# Patient Record
Sex: MALE | Race: BLACK OR AFRICAN AMERICAN | Employment: UNEMPLOYED | URBAN - METROPOLITAN AREA
[De-identification: names, ages, dates, MRNs, and addresses within clinical notes are randomized per-mention and may not be internally consistent; named-entity substitution may affect disease eponyms.]

---

## 2017-01-30 ENCOUNTER — HOSPITAL ENCOUNTER (EMERGENCY)
Age: 42
Discharge: HOME | End: 2017-01-30
Attending: EMERGENCY MEDICINE
Payer: SELF-PAY

## 2017-01-30 ENCOUNTER — APPOINTMENT (OUTPATIENT)
Dept: GENERAL RADIOLOGY | Age: 42
End: 2017-01-30
Attending: EMERGENCY MEDICINE
Payer: SELF-PAY

## 2017-01-30 VITALS
BODY MASS INDEX: 34.55 KG/M2 | WEIGHT: 215 LBS | DIASTOLIC BLOOD PRESSURE: 94 MMHG | TEMPERATURE: 97.9 F | HEART RATE: 100 BPM | SYSTOLIC BLOOD PRESSURE: 141 MMHG | OXYGEN SATURATION: 99 % | HEIGHT: 66 IN | RESPIRATION RATE: 17 BRPM

## 2017-01-30 DIAGNOSIS — M19.012 ARTHRITIS OF LEFT SHOULDER REGION: ICD-10-CM

## 2017-01-30 DIAGNOSIS — S46.912A SHOULDER STRAIN, LEFT, INITIAL ENCOUNTER: Primary | ICD-10-CM

## 2017-01-30 PROCEDURE — 73030 X-RAY EXAM OF SHOULDER: CPT

## 2017-01-30 PROCEDURE — 99282 EMERGENCY DEPT VISIT SF MDM: CPT | Performed by: EMERGENCY MEDICINE

## 2017-01-30 RX ORDER — TRAMADOL HYDROCHLORIDE 50 MG/1
50 TABLET ORAL
Qty: 20 TAB | Refills: 0 | Status: SHIPPED | OUTPATIENT
Start: 2017-01-30 | End: 2017-03-03 | Stop reason: ALTCHOICE

## 2017-01-30 RX ORDER — NAPROXEN SODIUM 550 MG/1
550 TABLET ORAL
Qty: 20 TAB | Refills: 0 | Status: SHIPPED | OUTPATIENT
Start: 2017-01-30 | End: 2017-03-03 | Stop reason: ALTCHOICE

## 2017-01-30 NOTE — ED PROVIDER NOTES
HPI Comments: Patient presents to the ER complaining of recurrent left shoulder pain. States symptoms have worsened over the past 3 days. Reports some achiness and stiffness to the left shoulder. Denies any chest pain or shortness of breath. Patient reports recurrent issues with pain in this shoulder in the past couple years. Patient is a 35 y.o. male presenting with shoulder pain. Shoulder Pain    The incident occurred more than 2 days ago. There was no injury mechanism. The left shoulder is affected. The pain is at a severity of 3/10. The pain is moderate. The pain does not radiate. He has no other injuries. There is no history of shoulder surgery. Pertinent negatives include no numbness, no muscle weakness and no tingling. Past Medical History:   Diagnosis Date    Acute pancreatitis 11/19/2014    [facility] ([facility] [address] 2002    [facility]     Diabetes mellitus     Hypertension        Past Surgical History:   Procedure Laterality Date    Hx hernia repair      Hx orthopaedic       carpal tunnel surgery         Family History:   Problem Relation Age of Onset    Diabetes Mother     Diabetes Father        Social History     Social History    Marital status:      Spouse name: N/A    Number of children: N/A    Years of education: N/A     Occupational History    Not on file. Social History Main Topics    Smoking status: Current Every Day Smoker     Packs/day: 0.25     Types: Cigarettes    Smokeless tobacco: Former User     Types: Chew    Alcohol use No    Drug use: No    Sexual activity: Yes     Partners: Female     Birth control/ protection: None     Other Topics Concern    Not on file     Social History Narrative         ALLERGIES: Review of patient's allergies indicates no known allergies. Review of Systems   Constitutional: Negative for fatigue, fever and unexpected weight change. HENT: Negative for congestion and dental problem. Eyes: Negative for photophobia. Respiratory: Negative for chest tightness, shortness of breath and stridor. Cardiovascular: Negative for palpitations and leg swelling. Gastrointestinal: Negative for abdominal pain. Endocrine: Negative for polydipsia, polyphagia and polyuria. Genitourinary: Negative for frequency, hematuria and urgency. Musculoskeletal: Negative for back pain and gait problem. Skin: Negative for pallor and rash. Allergic/Immunologic: Negative for food allergies and immunocompromised state. Neurological: Negative for tingling, syncope, speech difficulty and numbness. Hematological: Negative for adenopathy. Does not bruise/bleed easily. Psychiatric/Behavioral: Negative for behavioral problems and confusion. All other systems reviewed and are negative. Vitals:    01/30/17 1500   BP: (!) 141/94   Pulse: 100   Resp: 17   Temp: 97.9 °F (36.6 °C)   SpO2: 99%   Weight: 97.5 kg (215 lb)   Height: 5' 6\" (1.676 m)            Physical Exam   Constitutional: He is oriented to person, place, and time. He appears well-developed and well-nourished. HENT:   Head: Normocephalic and atraumatic. Cardiovascular: Normal rate and regular rhythm. Pulmonary/Chest: Effort normal and breath sounds normal.   Musculoskeletal:        Left shoulder: He exhibits tenderness. He exhibits no bony tenderness, no effusion, no deformity, no spasm and normal strength. Neurological: He is alert and oriented to person, place, and time. He has normal reflexes. Skin: Skin is warm. Nursing note and vitals reviewed. MDM  Number of Diagnoses or Management Options  Diagnosis management comments:  Will obtain x-ray of patient's left shoulder here, low suspicion for any emergent pathology    3:29 PM  Normal Xray  Have discussed results with patient, will need ortho follow-up eventually for recurrence of symptoms       Amount and/or Complexity of Data Reviewed  Tests in the radiology section of CPT®: ordered and reviewed    Risk of Complications, Morbidity, and/or Mortality  Presenting problems: low  Diagnostic procedures: low  Management options: low      ED Course       Procedures             XR SHOULDER LT AP/LAT MIN 2 V (Final result) Result time: 01/30/17 15:20:50     Final result by Meghan Dykes MD (01/30/17 15:20:50)     Impression:     IMPRESSION:    1. No acute abnormality in the left shoulder.       Narrative:      XR SHOULDER LT AP/LAT MIN 2 V    1/30/2017 3:12 PM     CLINICAL INFORMATION: Left shoulder pain with movement for 3 days. Comparison: 11/17/2016. Findings: 3 views left shoulder. The inferior most aspect of the scapula is  excluded from the field-of-view. Alignment is otherwise anatomic.  Mild chronic  appearing degeneration in the acromioclavicular joint is unchanged.

## 2017-01-30 NOTE — ED NOTES
I have reviewed discharge instructions with the patient. The patient verbalized understanding. Discharge instructions and prescriptions given to patient. Patient advised to follow up with orthopedics, patient verbally understood. Patient able to ambulate in room and hallway with no difficulties. Vital signs stable and in no acute distress.

## 2017-01-30 NOTE — DISCHARGE INSTRUCTIONS
Arthritis: Care Instructions  Your Care Instructions  Arthritis, also called osteoarthritis, is a breakdown of the cartilage that cushions your joints. When the cartilage wears down, your bones rub against each other. This causes pain and stiffness. Many people have some arthritis as they age. Arthritis most often affects the joints of the spine, hands, hips, knees, or feet. You can take simple measures to protect your joints, ease your pain, and help you stay active. Follow-up care is a key part of your treatment and safety. Be sure to make and go to all appointments, and call your doctor if you are having problems. It's also a good idea to know your test results and keep a list of the medicines you take. How can you care for yourself at home? · Stay at a healthy weight. Being overweight puts extra strain on your joints. · Talk to your doctor or physical therapist about exercises that will help ease joint pain. ¨ Stretch. You may enjoy gentle forms of yoga to help keep your joints and muscles flexible. ¨ Walk instead of jog. Other types of exercise that are less stressful on the joints include riding a bicycle, swimming, or water exercise. ¨ Lift weights. Strong muscles help reduce stress on your joints. Stronger thigh muscles, for example, take some of the stress off of the knees and hips. Learn the right way to lift weights so you do not make joint pain worse. · Take your medicines exactly as prescribed. Call your doctor if you think you are having a problem with your medicine. · Take pain medicines exactly as directed. ¨ If the doctor gave you a prescription medicine for pain, take it as prescribed. ¨ If you are not taking a prescription pain medicine, ask your doctor if you can take an over-the-counter medicine. · Use a cane, crutch, walker, or another device if you need help to get around. These can help rest your joints.  You also can use other things to make life easier, such as a higher toilet seat and padded handles on kitchen utensils. · Do not sit in low chairs, which can make it hard to get up. · Put heat or cold on your sore joints as needed. Use whichever helps you most. You also can take turns with hot and cold packs. ¨ Apply heat 2 or 3 times a day for 20 to 30 minutes--using a heating pad, hot shower, or hot pack--to relieve pain and stiffness. ¨ Put ice or a cold pack on your sore joint for 10 to 20 minutes at a time. Put a thin cloth between the ice and your skin. When should you call for help? Call your doctor now or seek immediate medical care if:  · You have sudden swelling, warmth, or pain in any joint. · You have joint pain and a fever or rash. · You have such bad pain that you cannot use a joint. Watch closely for changes in your health, and be sure to contact your doctor if:  · You have mild joint symptoms that continue even with more than 6 weeks of care at home. · You have stomach pain or other problems with your medicine. Where can you learn more? Go to http://sarah[employer] Serviceswayne.info/. Enter A310 in the search box to learn more about \"Arthritis: Care Instructions. \"  Current as of: February 24, 2016  Content Version: 11.1  © 8637-9338 [employer]. Care instructions adapted under license by [employer] (which disclaims liability or warranty for this information). If you have questions about a medical condition or this instruction, always ask your healthcare professional. [place] [address] any warranty or liability for your use of this information. Shoulder Pain: Care Instructions  Your Care Instructions    You can hurt your shoulder by using it too much during an activity, such as fishing or baseball. It can also happen as part of the everyday wear and tear of getting older. Shoulder injuries can be slow to heal, but your shoulder should get better with time.   Your doctor may recommend a sling to rest your shoulder. If you have injured your shoulder, you may need testing and treatment. Follow-up care is a key part of your treatment and safety. Be sure to make and go to all appointments, and call your doctor if you are having problems. It's also a good idea to know your test results and keep a list of the medicines you take. How can you care for yourself at home? · Take pain medicines exactly as directed. ¨ If the doctor gave you a prescription medicine for pain, take it as prescribed. ¨ If you are not taking a prescription pain medicine, ask your doctor if you can take an over-the-counter medicine. ¨ Do not take two or more pain medicines at the same time unless the doctor told you to. Many pain medicines contain acetaminophen, which is Tylenol. Too much acetaminophen (Tylenol) can be harmful. · If your doctor recommends that you wear a sling, use it as directed. Do not take it off before your doctor tells you to. · Put ice or a cold pack on the sore area for 10 to 20 minutes at a time. Put a thin cloth between the ice and your skin. · If there is no swelling, you can put moist heat, a heating pad, or a warm cloth on your shoulder. Some doctors suggest alternating between hot and cold. · Rest your shoulder for a few days. If your doctor recommends it, you can then begin gentle exercise of the shoulder, but do not lift anything heavy. When should you call for help? Call 911 anytime you think you may need emergency care. For example, call if:  · You have chest pain or pressure. This may occur with:  ¨ Sweating. ¨ Shortness of breath. ¨ Nausea or vomiting. ¨ Pain that spreads from the chest to the neck, jaw, or one or both shoulders or arms. ¨ Dizziness or lightheadedness. ¨ A fast or uneven pulse. After calling 911, chew 1 adult-strength aspirin. Wait for an ambulance. Do not try to drive yourself. · Your arm or hand is cool or pale or changes color.   Call your doctor now or seek immediate medical care if:  · You have signs of infection, such as:  ¨ Increased pain, swelling, warmth, or redness in your shoulder. ¨ Red streaks leading from a place on your shoulder. ¨ Pus draining from an area of your shoulder. ¨ Swollen lymph nodes in your neck, armpits, or groin. ¨ A fever. Watch closely for changes in your health, and be sure to contact your doctor if:  · You cannot use your shoulder. · Your shoulder does not get better as expected. Where can you learn more? Go to http://sarah-wayne.info/. Enter E588 in the search box to learn more about \"Shoulder Pain: Care Instructions. \"  Current as of: May 23, 2016  Content Version: 11.1  © 5050-1199 [employer]. Care instructions adapted under license by [employer] (which disclaims liability or warranty for this information). If you have questions about a medical condition or this instruction, always ask your healthcare professional. [place] [address] any warranty or liability for your use of this information.

## 2017-01-30 NOTE — ED TRIAGE NOTES
Pt in c/o non-radiating left shoulder pain since Friday. Pt denies injury. States pain is worse with movement of arm.  Pt states heavy lifting at work makes pain worse

## 2017-08-05 ENCOUNTER — HOSPITAL ENCOUNTER (EMERGENCY)
Age: 42
Discharge: HOME | End: 2017-08-05
Attending: EMERGENCY MEDICINE
Payer: SELF-PAY

## 2017-08-05 VITALS
DIASTOLIC BLOOD PRESSURE: 96 MMHG | RESPIRATION RATE: 16 BRPM | HEART RATE: 100 BPM | TEMPERATURE: 98 F | WEIGHT: 210 LBS | SYSTOLIC BLOOD PRESSURE: 152 MMHG | HEIGHT: 67 IN | OXYGEN SATURATION: 99 % | BODY MASS INDEX: 32.96 KG/M2

## 2017-08-05 DIAGNOSIS — K02.9 TOOTH DECAY: Primary | ICD-10-CM

## 2017-08-05 PROCEDURE — 99282 EMERGENCY DEPT VISIT SF MDM: CPT | Performed by: PHYSICIAN ASSISTANT

## 2017-08-05 RX ORDER — PENICILLIN V POTASSIUM 500 MG/1
500 TABLET, FILM COATED ORAL 4 TIMES DAILY
Qty: 28 TAB | Refills: 0 | Status: SHIPPED | OUTPATIENT
Start: 2017-08-05 | End: 2017-08-12

## 2017-08-05 RX ORDER — IBUPROFEN 800 MG/1
800 TABLET ORAL
Qty: 60 TAB | Refills: 0 | Status: SHIPPED | OUTPATIENT
Start: 2017-08-05 | End: 2017-08-25

## 2017-08-05 NOTE — DISCHARGE INSTRUCTIONS
Tooth Decay: Care Instructions  Your Care Instructions    Tooth decay is damage to a tooth caused by plaque. Plaque is a thin film of bacteria that sticks to the teeth above and below the gum line. If plaque isn't removed from the teeth, it can build up and harden into tartar. The bacteria in plaque and tartar use sugars in food to make acids. These acids can cause tooth decay and gum disease. Any part of your tooth can decay, from the roots below the gum line to the chewing surface. Decay can affect the outer layer (enamel) or inner layer (dentin) of your teeth. The deeper the decay, the worse the damage. Untreated tooth decay will get worse and may lead to tooth loss. If you have a small hole (cavity) in your tooth, your dentist can repair it by removing the decay and filling the hole. If you have deeper decay, you may need more treatment. A very badly damaged tooth may have to be removed. Follow-up care is a key part of your treatment and safety. Be sure to make and go to all appointments, and call your dentist if you are having problems. It's also a good idea to know your test results and keep a list of the medicines you take. How can you care for yourself at home? If you have pain:  · Take an over-the-counter pain medicine, such as acetaminophen (Tylenol), ibuprofen (Advil, Motrin), or naproxen (Aleve). Be safe with medicines. Read and follow all instructions on the label. ¨ Do not take two or more pain medicines at the same time unless the doctor told you to. Many pain medicines have acetaminophen, which is Tylenol. Too much acetaminophen (Tylenol) can be harmful. · Put ice or a cold pack on your cheek over the tooth for 10 to 15 minutes at a time. Put a thin cloth between the ice and your skin. To prevent tooth decay  · Brush teeth twice a day, and floss once a day. Brushing with fluoride toothpaste and flossing may be enough to reverse early decay.   · Use a toothbrush with soft, rounded-end bristles and a head that is small enough to reach all parts of your teeth and mouth. Replace your toothbrush every 3 or 4 months. You may also use an electric toothbrush that has rotating and oscillating (back-and-forth) action. · Ask your dentist about having fluoride treatments at the dental office. · Brush your tongue to help get rid of bacteria. · Eat healthy foods that include whole grains, vegetables, and fruits. · Have your teeth cleaned by a professional at least two times a year. · Do not smoke or use smokeless tobacco. Tobacco can make tooth decay worse. When should you call for help? Call 911 anytime you think you may need emergency care. For example, call if:  · You have trouble breathing. Call your dentist now or seek immediate medical care if:  · You have new or worse symptoms of infection, such as:  ¨ Increased pain, swelling, warmth, or redness. ¨ Red streaks leading from the area. ¨ Pus draining from the area. ¨ A fever. Watch closely for changes in your health, and be sure to contact your doctor if:  · You do not get better as expected. Where can you learn more? Go to http://sarah-wayne.info/. Enter N777 in the search box to learn more about \"Tooth Decay: Care Instructions. \"  Current as of: August 11, 2016  Content Version: 11.3  © 4744-3051 [employer]. Care instructions adapted under license by [employer] (which disclaims liability or warranty for this information). If you have questions about a medical condition or this instruction, always ask your healthcare professional. [place] [address] any warranty or liability for your use of this information.

## 2017-08-05 NOTE — ED NOTES
I have reviewed discharge instructions with the patient. The patient verbalized understanding. Patient is ambulatory from facility in no acute distress. Patient had discharge instructions and prescriptions x 2 in hand at time of departure.

## 2017-08-05 NOTE — ED PROVIDER NOTES
Patient is a 38 y.o. male presenting with dental problem. The history is provided by the patient. Dental Pain    This is a recurrent problem. The current episode started 2 days ago. The problem occurs constantly. The problem has been gradually worsening. The quality of the pain is aching. The pain is at a severity of 8/10. The pain is mild. There was no vomiting, no fever and no swelling. He has tried nothing for the symptoms. The treatment provided no relief. Past Medical History:   Diagnosis Date    Acute pancreatitis 11/19/2014    [facility] ([facility] [address] 2002    [facility]     Diabetes mellitus     Hypertension        Past Surgical History:   Procedure Laterality Date    HX HERNIA REPAIR      HX ORTHOPAEDIC      carpal tunnel surgery         Family History:   Problem Relation Age of Onset    Diabetes Mother     Diabetes Father        Social History     Social History    Marital status:      Spouse name: N/A    Number of children: N/A    Years of education: N/A     Occupational History    Not on file. Social History Main Topics    Smoking status: Current Every Day Smoker     Packs/day: 0.25     Types: Cigarettes    Smokeless tobacco: Former User     Types: Chew    Alcohol use No    Drug use: No    Sexual activity: Yes     Partners: Female     Birth control/ protection: None     Other Topics Concern    Not on file     Social History Narrative         ALLERGIES: Review of patient's allergies indicates no known allergies. Review of Systems   HENT: Positive for dental problem. All other systems reviewed and are negative. Vitals:    08/05/17 1005   BP: (!) 152/96   Pulse: 100   Resp: 16   Temp: 98 °F (36.7 °C)   SpO2: 99%   Weight: 95.3 kg (210 lb)   Height: 5' 7\" (1.702 m)            Physical Exam   Constitutional: He is oriented to person, place, and time. He appears well-developed and well-nourished. No distress. HENT:   Head: Normocephalic.    Mouth/Throat:       No jaw swelling all remaining upper teeth except for one front tooth is decayed to the gumline, left upper gum area with no swelling noted small remnants of teeth remain no signs of any obvious abscess   Eyes: Conjunctivae and EOM are normal. Pupils are equal, round, and reactive to light. Neck: Normal range of motion. Neck supple. Cardiovascular: Normal rate and regular rhythm. Pulmonary/Chest: Effort normal and breath sounds normal. No respiratory distress. He has no wheezes. Abdominal: Soft. Bowel sounds are normal.   Musculoskeletal: He exhibits no edema. Neurological: He is alert and oriented to person, place, and time. Skin: Skin is warm. Nursing note and vitals reviewed.        MDM  Number of Diagnoses or Management Options  Diagnosis management comments: Tooth decay with pain, seen here for same, will give penicillin and Motrin stress to get teeth extracted        Amount and/or Complexity of Data Reviewed  Review and summarize past medical records: yes    Risk of Complications, Morbidity, and/or Mortality  Presenting problems: low  Diagnostic procedures: low  Management options: low    Patient Progress  Patient progress: improved    ED Course       Procedures

## 2017-08-05 NOTE — ED TRIAGE NOTES
Pt states left upper gum pain due to teeth for a few days. States he can't see a dentist until insurance starts with his new job.

## 2017-09-14 ENCOUNTER — HOSPITAL ENCOUNTER (EMERGENCY)
Age: 42
Discharge: HOME | End: 2017-09-14
Attending: EMERGENCY MEDICINE
Payer: SELF-PAY

## 2017-09-14 VITALS
SYSTOLIC BLOOD PRESSURE: 168 MMHG | BODY MASS INDEX: 32.96 KG/M2 | OXYGEN SATURATION: 98 % | TEMPERATURE: 98.1 F | WEIGHT: 210 LBS | HEART RATE: 95 BPM | DIASTOLIC BLOOD PRESSURE: 95 MMHG | HEIGHT: 67 IN | RESPIRATION RATE: 17 BRPM

## 2017-09-14 DIAGNOSIS — Z76.0 MEDICATION REFILL: Primary | ICD-10-CM

## 2017-09-14 DIAGNOSIS — I10 ESSENTIAL HYPERTENSION: ICD-10-CM

## 2017-09-14 PROCEDURE — 99283 EMERGENCY DEPT VISIT LOW MDM: CPT | Performed by: EMERGENCY MEDICINE

## 2017-09-14 RX ORDER — HYDROCHLOROTHIAZIDE 25 MG/1
25 TABLET ORAL DAILY
Qty: 30 TAB | Refills: 0 | Status: SHIPPED | OUTPATIENT
Start: 2017-09-14 | End: 2017-10-14

## 2017-09-14 RX ORDER — LISINOPRIL 40 MG/1
40 TABLET ORAL DAILY
Qty: 30 TAB | Refills: 0 | Status: SHIPPED | OUTPATIENT
Start: 2017-09-14 | End: 2017-12-11

## 2017-09-14 NOTE — ED TRIAGE NOTES
Pt in states he has been out of lisinopril for past week and needs a refill. Hypertensive in triage with no complaints. Denies headache,blurry vision, cp,sob.

## 2017-09-14 NOTE — DISCHARGE INSTRUCTIONS
High Blood Pressure: Care Instructions  Your Care Instructions  If your blood pressure is usually above 140/90, you have high blood pressure, or hypertension. That means the top number is 140 or higher or the bottom number is 90 or higher, or both. Despite what a lot of people think, high blood pressure usually doesn't cause headaches or make you feel dizzy or lightheaded. It usually has no symptoms. But it does increase your risk for heart attack, stroke, and kidney or eye damage. The higher your blood pressure, the more your risk increases. Your doctor will give you a goal for your blood pressure. Your goal will be based on your health and your age. An example of a goal is to keep your blood pressure below 140/90. Lifestyle changes, such as eating healthy and being active, are always important to help lower blood pressure. You might also take medicine to reach your blood pressure goal.  Follow-up care is a key part of your treatment and safety. Be sure to make and go to all appointments, and call your doctor if you are having problems. It's also a good idea to know your test results and keep a list of the medicines you take. How can you care for yourself at home? Medical treatment  · If you stop taking your medicine, your blood pressure will go back up. You may take one or more types of medicine to lower your blood pressure. Be safe with medicines. Take your medicine exactly as prescribed. Call your doctor if you think you are having a problem with your medicine. · Talk to your doctor before you start taking aspirin every day. Aspirin can help certain people lower their risk of a heart attack or stroke. But taking aspirin isn't right for everyone, because it can cause serious bleeding. · See your doctor regularly. You may need to see the doctor more often at first or until your blood pressure comes down.   · If you are taking blood pressure medicine, talk to your doctor before you take decongestants or anti-inflammatory medicine, such as ibuprofen. Some of these medicines can raise blood pressure. · Learn how to check your blood pressure at home. Lifestyle changes  · Stay at a healthy weight. This is especially important if you put on weight around the waist. Losing even 10 pounds can help you lower your blood pressure. · If your doctor recommends it, get more exercise. Walking is a good choice. Bit by bit, increase the amount you walk every day. Try for at least 30 minutes on most days of the week. You also may want to swim, bike, or do other activities. · Avoid or limit alcohol. Talk to your doctor about whether you can drink any alcohol. · Try to limit how much sodium you eat to less than 2,300 milligrams (mg) a day. Your doctor may ask you to try to eat less than 1,500 mg a day. · Eat plenty of fruits (such as bananas and oranges), vegetables, legumes, whole grains, and low-fat dairy products. · Lower the amount of saturated fat in your diet. Saturated fat is found in animal products such as milk, cheese, and meat. Limiting these foods may help you lose weight and also lower your risk for heart disease. · Do not smoke. Smoking increases your risk for heart attack and stroke. If you need help quitting, talk to your doctor about stop-smoking programs and medicines. These can increase your chances of quitting for good. When should you call for help? Call 911 anytime you think you may need emergency care. This may mean having symptoms that suggest that your blood pressure is causing a serious heart or blood vessel problem. Your blood pressure may be over 180/110. For example, call 911 if:  · You have symptoms of a heart attack. These may include:  ¨ Chest pain or pressure, or a strange feeling in the chest.  ¨ Sweating. ¨ Shortness of breath. ¨ Nausea or vomiting. ¨ Pain, pressure, or a strange feeling in the back, neck, jaw, or upper belly or in one or both shoulders or arms.   ¨ Lightheadedness or sudden weakness. ¨ A fast or irregular heartbeat. · You have symptoms of a stroke. These may include:  ¨ Sudden numbness, tingling, weakness, or loss of movement in your face, arm, or leg, especially on only one side of your body. ¨ Sudden vision changes. ¨ Sudden trouble speaking. ¨ Sudden confusion or trouble understanding simple statements. ¨ Sudden problems with walking or balance. ¨ A sudden, severe headache that is different from past headaches. · You have severe back or belly pain. Do not wait until your blood pressure comes down on its own. Get help right away. Call your doctor now or seek immediate care if:  · Your blood pressure is much higher than normal (such as 180/110 or higher), but you don't have symptoms. · You think high blood pressure is causing symptoms, such as:  ¨ Severe headache. ¨ Blurry vision. Watch closely for changes in your health, and be sure to contact your doctor if:  · Your blood pressure measures 140/90 or higher at least 2 times. That means the top number is 140 or higher or the bottom number is 90 or higher, or both. · You think you may be having side effects from your blood pressure medicine. · Your blood pressure is usually normal, but it goes above normal at least 2 times. Where can you learn more? Go to http://sarah-wayne.info/. Enter O747 in the search box to learn more about \"High Blood Pressure: Care Instructions. \"  Current as of: August 8, 2016  Content Version: 11.3  © 6706-3178 [employer]. Care instructions adapted under license by [employer] (which disclaims liability or warranty for this information). If you have questions about a medical condition or this instruction, always ask your healthcare professional. [place] [address] any warranty or liability for your use of this information.

## 2017-09-14 NOTE — ED PROVIDER NOTES
Patient is a 38 y.o. male presenting with medication refill. The history is provided by the patient. Medication Refill   This is a recurrent problem. The current episode started more than 1 week ago. The problem occurs constantly. The problem has not changed since onset. Pertinent negatives include no chest pain, no abdominal pain, no headaches and no shortness of breath. Exacerbated by: noncompliant with medication. Nothing relieves the symptoms. He has tried nothing for the symptoms. Past Medical History:   Diagnosis Date    Acute pancreatitis 11/19/2014    [facility] [address] [facility] 2002    [facility]     Diabetes mellitus     Hypertension        Past Surgical History:   Procedure Laterality Date    HX HERNIA REPAIR      HX ORTHOPAEDIC      carpal tunnel surgery         Family History:   Problem Relation Age of Onset    Diabetes Mother     Diabetes Father        Social History     Social History    Marital status:      Spouse name: N/A    Number of children: N/A    Years of education: N/A     Occupational History    Not on file. Social History Main Topics    Smoking status: Current Every Day Smoker     Packs/day: 0.25     Types: Cigarettes    Smokeless tobacco: Former User     Types: Chew    Alcohol use No    Drug use: No    Sexual activity: Yes     Partners: Female     Birth control/ protection: None     Other Topics Concern    Not on file     Social History Narrative         ALLERGIES: Review of patient's allergies indicates no known allergies. Review of Systems   Constitutional: Negative for activity change, chills, diaphoresis and fever. HENT: Negative for dental problem, hearing loss, nosebleeds, rhinorrhea and sore throat. Eyes: Negative for pain, discharge, redness and visual disturbance. Respiratory: Negative for cough, chest tightness and shortness of breath. Cardiovascular: Negative for chest pain, palpitations and leg swelling.    Gastrointestinal: Negative for abdominal pain, constipation, diarrhea, nausea and vomiting. Endocrine: Negative for cold intolerance, heat intolerance, polydipsia and polyuria. Genitourinary: Negative for dysuria and flank pain. Musculoskeletal: Negative for arthralgias, back pain, joint swelling, myalgias and neck pain. Skin: Negative for pallor and rash. Allergic/Immunologic: Negative for environmental allergies and food allergies. Neurological: Negative for dizziness, tremors, light-headedness, numbness and headaches. Hematological: Negative for adenopathy. Does not bruise/bleed easily. Psychiatric/Behavioral: Negative for confusion and dysphoric mood. The patient is not nervous/anxious and is not hyperactive. All other systems reviewed and are negative. Vitals:    09/14/17 1111   BP: (!) 165/102   Pulse: 98   Resp: 15   Temp: 98.1 °F (36.7 °C)   SpO2: 97%   Weight: 95.3 kg (210 lb)   Height: 5' 7\" (1.702 m)            Physical Exam   Constitutional: He is oriented to person, place, and time. He appears well-developed and well-nourished. No distress. HENT:   Head: Normocephalic and atraumatic. Mouth/Throat: Oropharynx is clear and moist.   Eyes: Conjunctivae and EOM are normal. Pupils are equal, round, and reactive to light. Right eye exhibits no discharge. Left eye exhibits no discharge. No scleral icterus. Neck: Normal range of motion. Neck supple. No JVD present. Cardiovascular: Normal rate, regular rhythm, normal heart sounds and intact distal pulses. Exam reveals no gallop and no friction rub. No murmur heard. Pulmonary/Chest: Effort normal and breath sounds normal. No respiratory distress. He has no wheezes. Abdominal: Soft. Bowel sounds are normal. He exhibits no distension. There is no tenderness. There is no rebound and no guarding. obese   Musculoskeletal: Normal range of motion. He exhibits no edema or tenderness. Lymphadenopathy:     He has no cervical adenopathy.    Neurological: He is alert and oriented to person, place, and time. He has normal strength. No cranial nerve deficit or sensory deficit. He exhibits normal muscle tone. GCS eye subscore is 4. GCS verbal subscore is 5. GCS motor subscore is 6. Skin: Skin is warm and dry. No rash noted. He is not diaphoretic. No erythema. Psychiatric: He has a normal mood and affect. His speech is normal and behavior is normal. Judgment and thought content normal. Cognition and memory are normal.   Nursing note and vitals reviewed. MDM  Number of Diagnoses or Management Options  Essential hypertension: established and worsening  Medication refill: new and does not require workup  Diagnosis management comments: Differential diagnosis  Hypertension, diabetes, noncompliance    68-year-old male presents with complaints of running out of his lisinopril  In the process of trying to reestablish care with a new provider at [facility]. Patient denies chest pain, shortness of breath, abdominal pain, dizziness, lightheadedness, vision changes, states that he is symptom-free at this point    At this point, no workup is needed due to patient being asymptomatic, we'll restart the lisinopril and restart his hydrochlorothiazide  Currently the patient is on 40 mg per the last note       Amount and/or Complexity of Data Reviewed  Tests in the medicine section of CPT®: reviewed  Review and summarize past medical records: yes    Risk of Complications, Morbidity, and/or Mortality  Presenting problems: moderate  Diagnostic procedures: low  Management options: low  General comments: Elements of this note have been dictated via voice recognition software. Text and phrases may be limited by the accuracy of the software. The chart has been reviewed, but errors may still be present.       Patient Progress  Patient progress: stable    ED Course       Procedures

## 2017-10-02 ENCOUNTER — HOSPITAL ENCOUNTER (EMERGENCY)
Age: 42
Discharge: HOME | End: 2017-10-02
Attending: EMERGENCY MEDICINE
Payer: SELF-PAY

## 2017-10-02 VITALS
SYSTOLIC BLOOD PRESSURE: 185 MMHG | OXYGEN SATURATION: 95 % | TEMPERATURE: 98.8 F | DIASTOLIC BLOOD PRESSURE: 78 MMHG | RESPIRATION RATE: 18 BRPM | HEART RATE: 92 BPM

## 2017-10-02 DIAGNOSIS — J06.9 ACUTE UPPER RESPIRATORY INFECTION: Primary | ICD-10-CM

## 2017-10-02 PROCEDURE — 99282 EMERGENCY DEPT VISIT SF MDM: CPT | Performed by: NURSE PRACTITIONER

## 2017-10-02 RX ORDER — FLUTICASONE PROPIONATE 50 MCG
2 SPRAY, SUSPENSION (ML) NASAL DAILY
Qty: 1 BOTTLE | Refills: 0 | Status: SHIPPED | OUTPATIENT
Start: 2017-10-02 | End: 2018-01-08 | Stop reason: CLARIF

## 2017-10-02 NOTE — ED PROVIDER NOTES
HPI Comments: Patient presents with nasal congestion, cough, and sinus pressure for the past 3 days. He states cough is productive with clear sputum. He denies shortness of breath, chest pain, and fever. Patient is a 38 y.o. male presenting with nasal congestion. The history is provided by the patient. Nasal Congestion   This is a new problem. The current episode started more than 2 days ago. The problem occurs constantly. The problem has not changed since onset. Pertinent negatives include no chest pain, no abdominal pain, no headaches and no shortness of breath. Nothing aggravates the symptoms. Nothing relieves the symptoms. Treatments tried: allergy medication. The treatment provided no relief. Past Medical History:   Diagnosis Date    Acute pancreatitis 11/19/2014    Diabetes ([facility] [address] 2002    [facility]     Diabetes mellitus     Hypertension        Past Surgical History:   Procedure Laterality Date    HX HERNIA REPAIR      HX ORTHOPAEDIC      carpal tunnel surgery         Family History:   Problem Relation Age of Onset    Diabetes Mother     Diabetes Father        Social History     Social History    Marital status:      Spouse name: N/A    Number of children: N/A    Years of education: N/A     Occupational History    Not on file. Social History Main Topics    Smoking status: Current Every Day Smoker     Packs/day: 0.25     Types: Cigarettes    Smokeless tobacco: Former User     Types: Chew    Alcohol use No    Drug use: No    Sexual activity: Yes     Partners: Female     Birth control/ protection: None     Other Topics Concern    Not on file     Social History Narrative         ALLERGIES: Review of patient's allergies indicates no known allergies. Review of Systems   Constitutional: Negative for chills and fever. HENT: Positive for congestion, postnasal drip, sinus pain, sinus pressure and sneezing. Negative for sore throat, trouble swallowing and voice change. Respiratory: Positive for cough. Negative for shortness of breath. Cardiovascular: Negative for chest pain. Gastrointestinal: Negative for abdominal pain, constipation, diarrhea, nausea and vomiting. Musculoskeletal: Negative for arthralgias and myalgias. Neurological: Negative for dizziness, syncope and headaches. There were no vitals filed for this visit. Physical Exam   Constitutional: He is oriented to person, place, and time. He appears well-developed and well-nourished. No distress. HENT:   Right Ear: Tympanic membrane is not injected and not erythematous. A middle ear effusion is present. Left Ear: Tympanic membrane is not injected and not erythematous. A middle ear effusion is present. Nose: Mucosal edema present. Right sinus exhibits maxillary sinus tenderness and frontal sinus tenderness. Left sinus exhibits maxillary sinus tenderness and frontal sinus tenderness. Mouth/Throat: Uvula is midline and mucous membranes are normal. Posterior oropharyngeal erythema present. Cardiovascular: Normal rate and regular rhythm. No murmur heard. Pulmonary/Chest: Effort normal and breath sounds normal. No respiratory distress. He has no wheezes. Neurological: He is alert and oriented to person, place, and time. Skin: Skin is warm and dry. He is not diaphoretic. Psychiatric: He has a normal mood and affect. His behavior is normal.   Nursing note and vitals reviewed. MDM  Number of Diagnoses or Management Options  Acute upper respiratory infection:   Diagnosis management comments: Patient encouraged to continue taking over the counter allergy medication. Prescription for Flonase given.      Patient Progress  Patient progress: stable    ED Course       Procedures

## 2017-10-02 NOTE — DISCHARGE INSTRUCTIONS
Upper Respiratory Infection (Cold): Care Instructions  Your Care Instructions    An upper respiratory infection, or URI, is an infection of the nose, sinuses, or throat. URIs are spread by coughs, sneezes, and direct contact. The common cold is the most frequent kind of URI. The flu and sinus infections are other kinds of URIs. Almost all URIs are caused by viruses. Antibiotics won't cure them. But you can treat most infections with home care. This may include drinking lots of fluids and taking over-the-counter pain medicine. You will probably feel better in 4 to 10 days. The doctor has checked you carefully, but problems can develop later. If you notice any problems or new symptoms, get medical treatment right away. Follow-up care is a key part of your treatment and safety. Be sure to make and go to all appointments, and call your doctor if you are having problems. It's also a good idea to know your test results and keep a list of the medicines you take. How can you care for yourself at home? · To prevent dehydration, drink plenty of fluids, enough so that your urine is light yellow or clear like water. Choose water and other caffeine-free clear liquids until you feel better. If you have kidney, heart, or liver disease and have to limit fluids, talk with your doctor before you increase the amount of fluids you drink. · Take an over-the-counter pain medicine, such as acetaminophen (Tylenol), ibuprofen (Advil, Motrin), or naproxen (Aleve). Read and follow all instructions on the label. · Before you use cough and cold medicines, check the label. These medicines may not be safe for young children or for people with certain health problems. · Be careful when taking over-the-counter cold or flu medicines and Tylenol at the same time. Many of these medicines have acetaminophen, which is Tylenol. Read the labels to make sure that you are not taking more than the recommended dose.  Too much acetaminophen (Tylenol) can be harmful. · Get plenty of rest.  · Do not smoke or allow others to smoke around you. If you need help quitting, talk to your doctor about stop-smoking programs and medicines. These can increase your chances of quitting for good. When should you call for help? Call 911 anytime you think you may need emergency care. For example, call if:  · You have severe trouble breathing. Call your doctor now or seek immediate medical care if:  · You seem to be getting much sicker. · You have new or worse trouble breathing. · You have a new or higher fever. · You have a new rash. Watch closely for changes in your health, and be sure to contact your doctor if:  · You have a new symptom, such as a sore throat, an earache, or sinus pain. · You cough more deeply or more often, especially if you notice more mucus or a change in the color of your mucus. · You do not get better as expected. Where can you learn more? Go to http://sarah-wayne.info/. Enter M587 in the search box to learn more about \"Upper Respiratory Infection (Cold): Care Instructions. \"  Current as of: March 25, 2017  Content Version: 11.3  © 6127-0607 [employer]. Care instructions adapted under license by [employer] (which disclaims liability or warranty for this information). If you have questions about a medical condition or this instruction, always ask your healthcare professional. [place] [address] any warranty or liability for your use of this information.

## 2017-10-02 NOTE — ED TRIAGE NOTES
Pt to er c/o head congestion. .. C/o productive cough, denies sob. .. Denies chest pain. ..  Denies n/v/d

## 2017-10-12 ENCOUNTER — HOSPITAL ENCOUNTER (EMERGENCY)
Age: 42
Discharge: HOME | End: 2017-10-12
Attending: EMERGENCY MEDICINE
Payer: SELF-PAY

## 2017-10-12 ENCOUNTER — APPOINTMENT (OUTPATIENT)
Dept: GENERAL RADIOLOGY | Age: 42
End: 2017-10-12
Attending: EMERGENCY MEDICINE
Payer: SELF-PAY

## 2017-10-12 VITALS
TEMPERATURE: 99.8 F | SYSTOLIC BLOOD PRESSURE: 160 MMHG | RESPIRATION RATE: 18 BRPM | HEIGHT: 67 IN | OXYGEN SATURATION: 98 % | DIASTOLIC BLOOD PRESSURE: 100 MMHG | BODY MASS INDEX: 34.53 KG/M2 | WEIGHT: 220 LBS | HEART RATE: 84 BPM

## 2017-10-12 DIAGNOSIS — J20.9 ACUTE BRONCHITIS, UNSPECIFIED ORGANISM: Primary | ICD-10-CM

## 2017-10-12 PROCEDURE — 99283 EMERGENCY DEPT VISIT LOW MDM: CPT | Performed by: EMERGENCY MEDICINE

## 2017-10-12 PROCEDURE — 71020 XR CHEST PA LAT: CPT

## 2017-10-12 RX ORDER — PREDNISONE 20 MG/1
40 TABLET ORAL DAILY
Qty: 8 TAB | Refills: 0 | Status: SHIPPED | OUTPATIENT
Start: 2017-10-12 | End: 2017-10-16

## 2017-10-12 RX ORDER — AZITHROMYCIN 250 MG/1
TABLET, FILM COATED ORAL
Qty: 6 TAB | Refills: 0 | Status: SHIPPED | OUTPATIENT
Start: 2017-10-12 | End: 2017-10-17

## 2017-10-12 NOTE — ED PROVIDER NOTES
HPI Comments: Patient presents with cough, congestion for about 2 weeks. Patient occasionally smokes. History of diabetes and hypertension. No cardiac history. He reports significant seasonal allergies. no significant fevers, vomiting, abdominal pain, chest pain or leg swelling. Patient is a 38 y.o. male presenting with cough. The history is provided by the patient. No  was used. Cough   Pertinent negatives include no headaches, no sore throat, no nausea and no vomiting. Past Medical History:   Diagnosis Date    Acute pancreatitis 11/19/2014    [facility]    [facility]     Diabetes mellitus     Hypertension        Past Surgical History:   Procedure Laterality Date    HX HERNIA REPAIR      HX ORTHOPAEDIC      carpal tunnel surgery         Family History:   Problem Relation Age of Onset    Diabetes Mother     Diabetes Father        Social History     Social History    Marital status:      Spouse name: N/A    Number of children: N/A    Years of education: N/A     Occupational History    Not on file. Social History Main Topics    Smoking status: Current Every Day Smoker     Packs/day: 0.25     Types: Cigarettes    Smokeless tobacco: Former User     Types: Chew    Alcohol use No    Drug use: No    Sexual activity: Yes     Partners: Female     Birth control/ protection: None     Other Topics Concern    Not on file     Social History Narrative         ALLERGIES: Review of patient's allergies indicates no known allergies. Review of Systems   Constitutional: Negative for fatigue and fever. HENT: Positive for congestion. Negative for sore throat. Respiratory: Positive for cough. Gastrointestinal: Negative for abdominal pain, nausea and vomiting. Genitourinary: Negative for flank pain. Musculoskeletal: Negative for back pain. Neurological: Negative for headaches.        Vitals:    10/12/17 0436   BP: (!) 179/113   Pulse: 98   Resp: 14 Temp: 99.8 °F (37.7 °C)   SpO2: 98%   Weight: 99.8 kg (220 lb)   Height: 5' 7\" (1.702 m)            Physical Exam   Constitutional: He is oriented to person, place, and time. He appears well-developed and well-nourished. No distress. HENT:   Head: Normocephalic and atraumatic. Eyes: Conjunctivae and EOM are normal. Pupils are equal, round, and reactive to light. Neck: Normal range of motion. Neck supple. Cardiovascular: Normal rate, regular rhythm and normal heart sounds. Pulmonary/Chest: Effort normal and breath sounds normal. No respiratory distress. He has no wheezes. He has no rales. Occasional cough. Lung sounds clear bilaterally. Abdominal: Soft. He exhibits no distension. There is no tenderness. There is no rebound. Musculoskeletal: Normal range of motion. He exhibits no edema or tenderness. Neurological: He is alert and oriented to person, place, and time. Skin: Skin is warm and dry. No rash noted. He is not diaphoretic. Psychiatric: He has a normal mood and affect. His behavior is normal.   Vitals reviewed. MDM  Number of Diagnoses or Management Options  Acute bronchitis, unspecified organism: new and does not require workup  Diagnosis management comments: Chest x-ray is negative. Patient appears nontoxic. We will write for azithromycin and prednisone. Return precautions discussed. Amount and/or Complexity of Data Reviewed  Tests in the radiology section of CPT®: ordered and reviewed (Xr Chest Pa Lat    Result Date: 10/12/2017  Examination: Chest, PA and lateral views History: Chest congestion  Comparison: 2/12/2016 Findings: The cardiomediastinal silhouette is within normal limits in size. There is no focal pulmonary infiltrate, sizable pleural effusion, or pneumothorax. Visualized osseous structures are intact.      Impression:  No evidence of acute cardiopulmonary abnormality.     )  Review and summarize past medical records: yes  Independent visualization of images, tracings, or specimens: yes    Risk of Complications, Morbidity, and/or Mortality  Presenting problems: low  Diagnostic procedures: low  Management options: low    Patient Progress  Patient progress: stable    ED Course       Procedures

## 2017-10-12 NOTE — ED NOTES
I have reviewed discharge instructions with the patient. The patient verbalized understanding. Patient left ED via Discharge Method: ambulatory to Home with wife. Opportunity for questions and clarification provided. Patient given 2 scripts.

## 2017-10-12 NOTE — LETTER
NOTIFICATION RETURN TO WORK / SCHOOL 
 
10/12/2017 6:43 AM 
 
Mr. Regan Ho [facility] [address] [address] To Whom It May Concern: 
 
Regan Ho is currently under the care of [facility] EMERGENCY DEPT. He will return to work/school on: 10/13/17 Sincerely,

## 2017-10-12 NOTE — ED TRIAGE NOTES
Here with his wife who shares the deep chested cough and congestion.   Patient has been on Theraflu and OTC meds for the congestion

## 2017-10-12 NOTE — DISCHARGE INSTRUCTIONS
Bronchitis: Care Instructions  Your Care Instructions    Bronchitis is inflammation of the bronchial tubes, which carry air to the lungs. The tubes swell and produce mucus, or phlegm. The mucus and inflamed bronchial tubes make you cough. You may have trouble breathing. Most cases of bronchitis are caused by viruses like those that cause colds. Antibiotics usually do not help and they may be harmful. Bronchitis usually develops rapidly and lasts about 2 to 3 weeks in otherwise healthy people. Follow-up care is a key part of your treatment and safety. Be sure to make and go to all appointments, and call your doctor if you are having problems. It's also a good idea to know your test results and keep a list of the medicines you take. How can you care for yourself at home? · Take all medicines exactly as prescribed. Call your doctor if you think you are having a problem with your medicine. · Get some extra rest.  · Take an over-the-counter pain medicine, such as acetaminophen (Tylenol), ibuprofen (Advil, Motrin), or naproxen (Aleve) to reduce fever and relieve body aches. Read and follow all instructions on the label. · Do not take two or more pain medicines at the same time unless the doctor told you to. Many pain medicines have acetaminophen, which is Tylenol. Too much acetaminophen (Tylenol) can be harmful. · Take an over-the-counter cough medicine that contains dextromethorphan to help quiet a dry, hacking cough so that you can sleep. Avoid cough medicines that have more than one active ingredient. Read and follow all instructions on the label. · Breathe moist air from a humidifier, hot shower, or sink filled with hot water. The heat and moisture will thin mucus so you can cough it out. · Do not smoke. Smoking can make bronchitis worse. If you need help quitting, talk to your doctor about stop-smoking programs and medicines. These can increase your chances of quitting for good.   When should you call for help? Call 911 anytime you think you may need emergency care. For example, call if:  · You have severe trouble breathing. Call your doctor now or seek immediate medical care if:  · You have new or worse trouble breathing. · You cough up dark brown or bloody mucus (sputum). · You have a new or higher fever. · You have a new rash. Watch closely for changes in your health, and be sure to contact your doctor if:  · You cough more deeply or more often, especially if you notice more mucus or a change in the color of your mucus. · You are not getting better as expected. Where can you learn more? Go to http://sarah-wayne.info/. Enter H333 in the search box to learn more about \"Bronchitis: Care Instructions. \"  Current as of: March 25, 2017  Content Version: 11.3  © 6677-4548 [employer]. Care instructions adapted under license by [employer] (which disclaims liability or warranty for this information). If you have questions about a medical condition or this instruction, always ask your healthcare professional. [address] any warranty or liability for your use of this information.

## 2017-12-11 ENCOUNTER — HOSPITAL ENCOUNTER (EMERGENCY)
Age: 42
Discharge: HOME | End: 2017-12-11
Attending: EMERGENCY MEDICINE
Payer: SELF-PAY

## 2017-12-11 VITALS
HEIGHT: 66 IN | BODY MASS INDEX: 33.75 KG/M2 | TEMPERATURE: 98.3 F | RESPIRATION RATE: 18 BRPM | DIASTOLIC BLOOD PRESSURE: 109 MMHG | SYSTOLIC BLOOD PRESSURE: 171 MMHG | HEART RATE: 89 BPM | OXYGEN SATURATION: 99 % | WEIGHT: 210 LBS

## 2017-12-11 DIAGNOSIS — K08.89 PAIN, DENTAL: Primary | ICD-10-CM

## 2017-12-11 DIAGNOSIS — I10 HYPERTENSION, UNSPECIFIED TYPE: ICD-10-CM

## 2017-12-11 LAB — GLUCOSE BLD STRIP.AUTO-MCNC: 210 MG/DL (ref 65–100)

## 2017-12-11 PROCEDURE — 74011250637 HC RX REV CODE- 250/637: Performed by: EMERGENCY MEDICINE

## 2017-12-11 PROCEDURE — 82962 GLUCOSE BLOOD TEST: CPT

## 2017-12-11 PROCEDURE — 99283 EMERGENCY DEPT VISIT LOW MDM: CPT | Performed by: EMERGENCY MEDICINE

## 2017-12-11 RX ORDER — LISINOPRIL 20 MG/1
40 TABLET ORAL
Status: COMPLETED | OUTPATIENT
Start: 2017-12-11 | End: 2017-12-11

## 2017-12-11 RX ORDER — LISINOPRIL 40 MG/1
40 TABLET ORAL DAILY
Qty: 30 TAB | Refills: 0 | Status: SHIPPED | OUTPATIENT
Start: 2017-12-11 | End: 2018-07-24

## 2017-12-11 RX ORDER — NAPROXEN 375 MG/1
375 TABLET ORAL 2 TIMES DAILY WITH MEALS
Qty: 14 TAB | Refills: 0 | Status: SHIPPED | OUTPATIENT
Start: 2017-12-11 | End: 2018-01-08 | Stop reason: CLARIF

## 2017-12-11 RX ORDER — AMOXICILLIN 500 MG/1
500 TABLET, FILM COATED ORAL 3 TIMES DAILY
Qty: 21 TAB | Refills: 0 | Status: SHIPPED | OUTPATIENT
Start: 2017-12-11 | End: 2018-01-08 | Stop reason: CLARIF

## 2017-12-11 RX ADMIN — LISINOPRIL 40 MG: 20 TABLET ORAL at 15:48

## 2017-12-11 NOTE — DISCHARGE INSTRUCTIONS
High Blood Pressure: Care Instructions  Your Care Instructions    If your blood pressure is usually above 140/90, you have high blood pressure, or hypertension. That means the top number is 140 or higher or the bottom number is 90 or higher, or both. Despite what a lot of people think, high blood pressure usually doesn't cause headaches or make you feel dizzy or lightheaded. It usually has no symptoms. But it does increase your risk for heart attack, stroke, and kidney or eye damage. The higher your blood pressure, the more your risk increases. Your doctor will give you a goal for your blood pressure. Your goal will be based on your health and your age. An example of a goal is to keep your blood pressure below 140/90. Lifestyle changes, such as eating healthy and being active, are always important to help lower blood pressure. You might also take medicine to reach your blood pressure goal.  Follow-up care is a key part of your treatment and safety. Be sure to make and go to all appointments, and call your doctor if you are having problems. It's also a good idea to know your test results and keep a list of the medicines you take. How can you care for yourself at home? Medical treatment  · If you stop taking your medicine, your blood pressure will go back up. You may take one or more types of medicine to lower your blood pressure. Be safe with medicines. Take your medicine exactly as prescribed. Call your doctor if you think you are having a problem with your medicine. · Talk to your doctor before you start taking aspirin every day. Aspirin can help certain people lower their risk of a heart attack or stroke. But taking aspirin isn't right for everyone, because it can cause serious bleeding. · See your doctor regularly. You may need to see the doctor more often at first or until your blood pressure comes down.   · If you are taking blood pressure medicine, talk to your doctor before you take decongestants or anti-inflammatory medicine, such as ibuprofen. Some of these medicines can raise blood pressure. · Learn how to check your blood pressure at home. Lifestyle changes  · Stay at a healthy weight. This is especially important if you put on weight around the waist. Losing even 10 pounds can help you lower your blood pressure. · If your doctor recommends it, get more exercise. Walking is a good choice. Bit by bit, increase the amount you walk every day. Try for at least 30 minutes on most days of the week. You also may want to swim, bike, or do other activities. · Avoid or limit alcohol. Talk to your doctor about whether you can drink any alcohol. · Try to limit how much sodium you eat to less than 2,300 milligrams (mg) a day. Your doctor may ask you to try to eat less than 1,500 mg a day. · Eat plenty of fruits (such as bananas and oranges), vegetables, legumes, whole grains, and low-fat dairy products. · Lower the amount of saturated fat in your diet. Saturated fat is found in animal products such as milk, cheese, and meat. Limiting these foods may help you lose weight and also lower your risk for heart disease. · Do not smoke. Smoking increases your risk for heart attack and stroke. If you need help quitting, talk to your doctor about stop-smoking programs and medicines. These can increase your chances of quitting for good. When should you call for help? Call 911 anytime you think you may need emergency care. This may mean having symptoms that suggest that your blood pressure is causing a serious heart or blood vessel problem. Your blood pressure may be over 180/110. ? For example, call 911 if:  ? · You have symptoms of a heart attack. These may include:  ¨ Chest pain or pressure, or a strange feeling in the chest.  ¨ Sweating. ¨ Shortness of breath. ¨ Nausea or vomiting.   ¨ Pain, pressure, or a strange feeling in the back, neck, jaw, or upper belly or in one or both shoulders or arms.  ¨ Lightheadedness or sudden weakness. ¨ A fast or irregular heartbeat. ? · You have symptoms of a stroke. These may include:  ¨ Sudden numbness, tingling, weakness, or loss of movement in your face, arm, or leg, especially on only one side of your body. ¨ Sudden vision changes. ¨ Sudden trouble speaking. ¨ Sudden confusion or trouble understanding simple statements. ¨ Sudden problems with walking or balance. ¨ A sudden, severe headache that is different from past headaches. ? · You have severe back or belly pain. ?Do not wait until your blood pressure comes down on its own. Get help right away. ?Call your doctor now or seek immediate care if:  ? · Your blood pressure is much higher than normal (such as 180/110 or higher), but you don't have symptoms. ? · You think high blood pressure is causing symptoms, such as:  ¨ Severe headache. ¨ Blurry vision. ? Watch closely for changes in your health, and be sure to contact your doctor if:  ? · Your blood pressure measures 140/90 or higher at least 2 times. That means the top number is 140 or higher or the bottom number is 90 or higher, or both. ? · You think you may be having side effects from your blood pressure medicine. ? · Your blood pressure is usually normal, but it goes above normal at least 2 times. Where can you learn more? Go to http://sarah-wayne.info/. Enter K002 in the search box to learn more about \"High Blood Pressure: Care Instructions. \"  Current as of: September 21, 2016  Content Version: 11.4  © 9866-3256 [employer]. Care instructions adapted under license by [employer] (which disclaims liability or warranty for this information). If you have questions about a medical condition or this instruction, always ask your healthcare professional. [place] [address] any warranty or liability for your use of this information.        Tooth and Gum Pain: Care Instructions  Your Care Instructions    The most common causes of dental pain are tooth decay and gum disease. Pain can also be caused by an infection of the tooth (abscess) or the gums. Or you may have pain from a broken or cracked tooth. Other causes of pain include infection and damage to a tooth from nervous grinding of your teeth. A wisdom tooth can be painful when it is coming in but cannot break through the gum. It can also be painful when the tooth is only partway in and extra gum tissue has formed around it. The tissue can get inflamed (pericoronitis), and sometimes it gets infected. Prompt dental care can help find the cause of your toothache and keep the tooth from dying or gum disease from getting worse. Self-care at home may reduce your pain and discomfort. Follow-up care is a key part of your treatment and safety. Be sure to make and go to all appointments, and call your dentist or doctor if you are having problems. It's also a good idea to know your test results and keep a list of the medicines you take. How can you care for yourself at home? · To reduce pain and facial swelling, put an ice or cold pack on the outside of your cheek for 10 to 20 minutes at a time. Put a thin cloth between the ice and your skin. Do not use heat. · If your doctor prescribed antibiotics, take them as directed. Do not stop taking them just because you feel better. You need to take the full course of antibiotics. · Ask your doctor if you can take an over-the-counter pain medicine, such as acetaminophen (Tylenol), ibuprofen (Advil, Motrin), or naproxen (Aleve). Be safe with medicines. Read and follow all instructions on the label. · Avoid very hot, cold, or sweet foods and drinks if they increase your pain. · Rinse your mouth with warm salt water every 2 hours to help relieve pain and swelling. Mix 1 teaspoon of salt in 8 ounces of water. · Talk to your dentist about using special toothpaste for sensitive teeth.  To reduce pain on contact with heat or cold or when brushing, brush with this toothpaste regularly or rub a small amount of the paste on the sensitive area with a clean finger 2 or 3 times a day. Floss gently between your teeth. · Do not smoke or use spit tobacco. Tobacco use can make gum problems worse, decreases your ability to fight infection in your gums, and delays healing. If you need help quitting, talk to your doctor about stop-smoking programs and medicines. These can increase your chances of quitting for good. When should you call for help? Call 911 anytime you think you may need emergency care. For example, call if:  ? · You have trouble breathing. ?Call your dentist or doctor now or seek immediate medical care if:  ? · You have signs of infection, such as:  ¨ Increased pain, swelling, warmth, or redness. ¨ Red streaks leading from the area. ¨ Pus draining from the area. ¨ A fever. ? Watch closely for changes in your health, and be sure to contact your doctor if:  ? · You do not get better as expected. Where can you learn more? Go to http://sarah-wayne.info/. Enter [id number] [id number] in the search box to learn more about \"Tooth and Gum Pain: Care Instructions. \"  Current as of: May 12, 2017  Content Version: 11.4  © 5437-6597 [employer]. Care instructions adapted under license by [employer] (which disclaims liability or warranty for this information). If you have questions about a medical condition or this instruction, always ask your healthcare professional. [place] [address] any warranty or liability for your use of this information.

## 2017-12-11 NOTE — ED PROVIDER NOTES
HPI Comments: Patient is a 37 yo male who is coming in with left upper dental pain that he's had since yesterday. He has very poor dentition and has been in our ER several times for dental pains. He has multiple decaying teeth he does not have any insurance and has been having difficulty getting in with a dentist to have these removed. He has no fevers or chills. He also has been forgetting to take his blood pressure medications he does still have a few lisinopril left. He has no chest pain or shortness of breath. The history is provided by the patient. Past Medical History:   Diagnosis Date    Acute pancreatitis 11/19/2014    Diabetes ([facility] [address] 2002    Diabetes ([facility] [place] [address]     Diabetes mellitus     Hypertension        Past Surgical History:   Procedure Laterality Date    HX HERNIA REPAIR      HX ORTHOPAEDIC      carpal tunnel surgery         Family History:   Problem Relation Age of Onset    Diabetes Mother     Diabetes Father        Social History     Social History    Marital status:      Spouse name: N/A    Number of children: N/A    Years of education: N/A     Occupational History    Not on file. Social History Main Topics    Smoking status: Current Every Day Smoker     Packs/day: 0.25     Types: Cigarettes    Smokeless tobacco: Former User     Types: Chew    Alcohol use No    Drug use: No    Sexual activity: Yes     Partners: Female     Birth control/ protection: None     Other Topics Concern    Not on file     Social History Narrative         ALLERGIES: Review of patient's allergies indicates no known allergies. Review of Systems   Constitutional: Negative for chills and fever. Gastrointestinal: Negative for abdominal pain, diarrhea, nausea and vomiting. Musculoskeletal: Negative for neck pain and neck stiffness. Skin: Negative. Negative for color change, rash and wound. All other systems reviewed and are negative.       Vitals:    12/11/17 1505 12/11/17 1548 BP: (!) 178/124 (!) 171/109   Pulse: 100 89   Resp: 18    Temp: 98.3 °F (36.8 °C)    SpO2: 99%    Weight: 95.3 kg (210 lb)    Height: 5' 6\" (1.676 m)             Physical Exam   Constitutional: He is oriented to person, place, and time. He appears well-developed and well-nourished. No distress. HENT:   Head: Normocephalic and atraumatic. Eyes: Conjunctivae are normal. No scleral icterus. Neck: Normal range of motion. Neck supple. Cardiovascular: Normal rate, regular rhythm and normal heart sounds. Pulmonary/Chest: Effort normal and breath sounds normal. No stridor. No respiratory distress. He has no wheezes. He has no rales. He exhibits no tenderness. Abdominal: Soft. He exhibits no distension. There is no tenderness. There is no rebound and no guarding. Neurological: He is alert and oriented to person, place, and time. No focal weakness   Skin: Skin is warm and dry. No rash noted. He is not diaphoretic. No erythema. Psychiatric: He has a normal mood and affect. His behavior is normal.   Nursing note and vitals reviewed. MDM  Number of Diagnoses or Management Options  Hypertension, unspecified type:   Pain, dental:   Diagnosis management comments: Patient informed to take blood pressure medications or his labile have strokes and other complications. He also is aware that he needs a dentist because he has multiple decaying teeth that need pulled I will give him treatment. Brett Jha MD; 12/11/2017 @3:53 PM Voice dictation software was used during the making of this note. This software is not perfect and grammatical and other typographical errors may be present.   This note has not been proofread for errors.  ===================================================================        Amount and/or Complexity of Data Reviewed  Clinical lab tests: ordered and reviewed (Results for orders placed or performed during the hospital encounter of 12/11/17  -GLUCOSE, POC       Result Value                         Ref Range                       Glucose (POC)                                     210 (H)                       65 - 100 mg/dL            )      ED Course       Procedures

## 2017-12-11 NOTE — ED NOTES
PMD-None. Dentist-None. Pt reports left upper dental pain since 3 am. He has numerous upper broken teeth and he states that he can't actually tell which tooth it is. No significant swelling. POC BGL is 210 in triage.

## 2017-12-11 NOTE — ED NOTES
I have reviewed discharge instructions with the patient. The patient verbalized understanding. Patient left ED via Discharge Method: ambulatory to Home with (self). Opportunity for questions and clarification provided. Patient given 3 scripts. To continue your aftercare when you leave the hospital, you may receive an automated call from our care team to check in on how you are doing. This is a free service and part of our promise to provide the best care and service to meet your aftercare needs.  If you have questions, or wish to unsubscribe from this service please call [phone]. Thank you for Choosing our [facility].

## 2018-01-08 ENCOUNTER — HOSPITAL ENCOUNTER (EMERGENCY)
Age: 43
Discharge: HOME | End: 2018-01-08
Attending: EMERGENCY MEDICINE
Payer: SELF-PAY

## 2018-01-08 VITALS
HEIGHT: 66 IN | RESPIRATION RATE: 20 BRPM | BODY MASS INDEX: 35.36 KG/M2 | DIASTOLIC BLOOD PRESSURE: 119 MMHG | WEIGHT: 220 LBS | TEMPERATURE: 98.9 F | HEART RATE: 105 BPM | SYSTOLIC BLOOD PRESSURE: 173 MMHG | OXYGEN SATURATION: 97 %

## 2018-01-08 DIAGNOSIS — J32.0 MAXILLARY SINUSITIS, UNSPECIFIED CHRONICITY: ICD-10-CM

## 2018-01-08 DIAGNOSIS — I10 UNCONTROLLED HYPERTENSION: Primary | ICD-10-CM

## 2018-01-08 LAB — GLUCOSE BLD STRIP.AUTO-MCNC: 122 MG/DL (ref 65–100)

## 2018-01-08 PROCEDURE — 99283 EMERGENCY DEPT VISIT LOW MDM: CPT | Performed by: PHYSICIAN ASSISTANT

## 2018-01-08 PROCEDURE — 82962 GLUCOSE BLOOD TEST: CPT

## 2018-01-08 RX ORDER — AMLODIPINE BESYLATE 10 MG/1
10 TABLET ORAL DAILY
Qty: 30 TAB | Refills: 0 | Status: SHIPPED | OUTPATIENT
Start: 2018-01-08 | End: 2018-07-24

## 2018-01-08 RX ORDER — AMLODIPINE BESYLATE 10 MG/1
10 TABLET ORAL DAILY
Qty: 30 TAB | Refills: 0 | Status: SHIPPED | OUTPATIENT
Start: 2018-01-08 | End: 2018-01-08

## 2018-01-08 RX ORDER — AMOXICILLIN 875 MG/1
875 TABLET, FILM COATED ORAL 2 TIMES DAILY
Qty: 20 TAB | Refills: 0 | Status: SHIPPED | OUTPATIENT
Start: 2018-01-08 | End: 2018-01-08

## 2018-01-08 RX ORDER — AMOXICILLIN 875 MG/1
875 TABLET, FILM COATED ORAL 2 TIMES DAILY
Qty: 20 TAB | Refills: 0 | Status: SHIPPED | OUTPATIENT
Start: 2018-01-08 | End: 2018-01-18

## 2018-01-08 RX ORDER — AZELASTINE 1 MG/ML
1 SPRAY, METERED NASAL 2 TIMES DAILY
Qty: 1 BOTTLE | Refills: 0 | Status: SHIPPED | OUTPATIENT
Start: 2018-01-08 | End: 2018-07-24

## 2018-01-08 NOTE — Clinical Note
Drink plenty of fluids, rest, finish all of the amoxicillin. Start the norvasc for your BP, continue with your lisinopril. Keep a check on BP, check it once or twice weekly with same blood pressure monitor, write the number down with date and time and ta ke that with you to see a PCP for recheck of BP.

## 2018-01-08 NOTE — ED NOTES
I have reviewed discharge instructions with the patient. The patient verbalized understanding. Patient left ED via Discharge Method: ambulatory to Home with self. Opportunity for questions and clarification provided. Patient given 4 scripts. To continue your aftercare when you leave the hospital, you may receive an automated call from our care team to check in on how you are doing. This is a free service and part of our promise to provide the best care and service to meet your aftercare needs.  If you have questions, or wish to unsubscribe from this service please call [phone]. Thank you for Choosing our [facility] Emergency Department.

## 2018-01-08 NOTE — PROGRESS NOTES
Visited with patient as no PCP listed in chart. Patient states no PCP and no insurance. Explained the [address] program in detail and provided patient with resources. Contact information for [facility] with [address] given to patient and patient encouraged to follow up with her as soon as possible. Patient also given contact information for [facility] with [place] and encouraged to call to get screened for Medicaid/Insurance eligibility and/or financial assistance.

## 2018-01-08 NOTE — DISCHARGE INSTRUCTIONS
Learning About High Blood Pressure  What is high blood pressure? Blood pressure is a measure of how hard the blood pushes against the walls of your arteries. It's normal for blood pressure to go up and down throughout the day, but if it stays up, you have high blood pressure. Another name for high blood pressure is hypertension. Two numbers tell you your blood pressure. The first number is the systolic pressure. It shows how hard the blood pushes when your heart is pumping. The second number is the diastolic pressure. It shows how hard the blood pushes between heartbeats, when your heart is relaxed and filling with blood. A blood pressure of less than 120/80 (say \"120 over 80\") is ideal for an adult. High blood pressure is 140/90 or higher. You have high blood pressure if your top number is 140 or higher or your bottom number is 90 or higher, or both. Many people fall into the category in between, called prehypertension. People with prehypertension need to make lifestyle changes to bring their blood pressure down and help prevent or delay high blood pressure. What happens when you have high blood pressure? · Blood flows through your arteries with too much force. Over time, this damages the walls of your arteries. But you can't feel it. High blood pressure usually doesn't cause symptoms. · Fat and calcium start to build up in your arteries. This buildup is called plaque. Plaque makes your arteries narrower and stiffer. Blood can't flow through them as easily. · This lack of good blood flow starts to damage some of the organs in your body. This can lead to problems such as coronary artery disease and heart attack, heart failure, stroke, kidney failure, and eye damage. How can you prevent high blood pressure? · Stay at a healthy weight. · Try to limit how much sodium you eat to less than 2,300 milligrams (mg) a day.  If you limit your sodium to 1,500 mg a day, you can lower your blood pressure even more.  ¨ Buy foods that are labeled \"unsalted,\" \"sodium-free,\" or \"low-sodium. \" Foods labeled \"reduced-sodium\" and \"light sodium\" may still have too much sodium. ¨ Flavor your food with garlic, lemon juice, onion, vinegar, herbs, and spices instead of salt. Do not use soy sauce, steak sauce, onion salt, garlic salt, mustard, or ketchup on your food. ¨ Use less salt (or none) when recipes call for it. You can often use half the salt a recipe calls for without losing flavor. · Be physically active. Get at least 30 minutes of exercise on most days of the week. Walking is a good choice. You also may want to do other activities, such as running, swimming, cycling, or playing tennis or team sports. · Limit alcohol to 2 drinks a day for men and 1 drink a day for women. · Eat plenty of fruits, vegetables, and low-fat dairy products. Eat less saturated and total fats. How is high blood pressure treated? · Your doctor will suggest making lifestyle changes. For example, your doctor may ask you to eat healthy foods, quit smoking, lose extra weight, and be more active. · If lifestyle changes don't help enough or your blood pressure is very high, you will have to take medicine every day. Follow-up care is a key part of your treatment and safety. Be sure to make and go to all appointments, and call your doctor if you are having problems. It's also a good idea to know your test results and keep a list of the medicines you take. Where can you learn more? Go to http://sarah-wayne.info/. Enter P501 in the search box to learn more about \"Learning About High Blood Pressure. \"  Current as of: September 21, 2016  Content Version: 11.4  © 3779-8849 [employer]. Care instructions adapted under license by [employer] (which disclaims liability or warranty for this information).  If you have questions about a medical condition or this instruction, always ask your healthcare professional. [employer], [facility] disclaims any warranty or liability for your use of this information. DASH Diet: Care Instructions  Your Care Instructions    The DASH diet is an eating plan that can help lower your blood pressure. DASH stands for Dietary Approaches to Stop Hypertension. Hypertension is high blood pressure. The DASH diet focuses on eating foods that are high in calcium, potassium, and magnesium. These nutrients can lower blood pressure. The foods that are highest in these nutrients are fruits, vegetables, low-fat dairy products, nuts, seeds, and legumes. But taking calcium, potassium, and magnesium supplements instead of eating foods that are high in those nutrients does not have the same effect. The DASH diet also includes whole grains, fish, and poultry. The DASH diet is one of several lifestyle changes your doctor may recommend to lower your high blood pressure. Your doctor may also want you to decrease the amount of sodium in your diet. Lowering sodium while following the DASH diet can lower blood pressure even further than just the DASH diet alone. Follow-up care is a key part of your treatment and safety. Be sure to make and go to all appointments, and call your doctor if you are having problems. It's also a good idea to know your test results and keep a list of the medicines you take. How can you care for yourself at home? Following the DASH diet  · Eat 4 to 5 servings of fruit each day. A serving is 1 medium-sized piece of fruit, ½ cup chopped or canned fruit, 1/4 cup dried fruit, or 4 ounces (½ cup) of fruit juice. Choose fruit more often than fruit juice. · Eat 4 to 5 servings of vegetables each day. A serving is 1 cup of lettuce or raw leafy vegetables, ½ cup of chopped or cooked vegetables, or 4 ounces (½ cup) of vegetable juice. Choose vegetables more often than vegetable juice. · Get 2 to 3 servings of low-fat and fat-free dairy each day.  A serving is 8 ounces of milk, 1 cup of yogurt, or 1 ½ ounces of cheese. · Eat 6 to 8 servings of grains each day. A serving is 1 slice of bread, 1 ounce of dry cereal, or ½ cup of cooked rice, pasta, or cooked cereal. Try to choose whole-grain products as much as possible. · Limit lean meat, poultry, and fish to 2 servings each day. A serving is 3 ounces, about the size of a deck of cards. · Eat 4 to 5 servings of nuts, seeds, and legumes (cooked dried beans, lentils, and split peas) each week. A serving is 1/3 cup of nuts, 2 tablespoons of seeds, or ½ cup of cooked beans or peas. · Limit fats and oils to 2 to 3 servings each day. A serving is 1 teaspoon of vegetable oil or 2 tablespoons of salad dressing. · Limit sweets and added sugars to 5 servings or less a week. A serving is 1 tablespoon jelly or jam, ½ cup sorbet, or 1 cup of lemonade. · Eat less than 2,300 milligrams (mg) of sodium a day. If you limit your sodium to 1,500 mg a day, you can lower your blood pressure even more. Tips for success  · Start small. Do not try to make dramatic changes to your diet all at once. You might feel that you are missing out on your favorite foods and then be more likely to not follow the plan. Make small changes, and stick with them. Once those changes become habit, add a few more changes. · Try some of the following:  ¨ Make it a goal to eat a fruit or vegetable at every meal and at snacks. This will make it easy to get the recommended amount of fruits and vegetables each day. ¨ Try yogurt topped with fruit and nuts for a snack or healthy dessert. ¨ Add lettuce, tomato, cucumber, and onion to sandwiches. ¨ Combine a ready-made pizza crust with low-fat mozzarella cheese and lots of vegetable toppings. Try using tomatoes, squash, spinach, broccoli, carrots, cauliflower, and onions. ¨ Have a variety of cut-up vegetables with a low-fat dip as an appetizer instead of chips and dip. ¨ Sprinkle sunflower seeds or chopped almonds over salads.  Or try adding chopped walnuts or almonds to cooked vegetables. ¨ Try some vegetarian meals using beans and peas. Add garbanzo or kidney beans to salads. Make burritos and tacos with mashed rosas beans or black beans. Where can you learn more? Go to http://sarah-wayne.info/. Enter E493 in the search box to learn more about \"DASH Diet: Care Instructions. \"  Current as of: September 21, 2016  Content Version: 11.4  © 0291-0722 [employer]. Care instructions adapted under license by [employer] (which disclaims liability or warranty for this information). If you have questions about a medical condition or this instruction, always ask your healthcare professional. [address] any warranty or liability for your use of this information. Saline Nasal Washes: Care Instructions  Your Care Instructions  Saline nasal washes help keep the nasal passages open by washing out thick or dried mucus. This simple remedy can help relieve symptoms of allergies, sinusitis, and colds. It also can make the nose feel more comfortable by keeping the mucous membranes moist. You may notice a little burning sensation in your nose the first few times you use the solution, but this usually gets better in a few days. Follow-up care is a key part of your treatment and safety. Be sure to make and go to all appointments, and call your doctor if you are having problems. It's also a good idea to know your test results and keep a list of the medicines you take. How can you care for yourself at home? · You can buy premixed saline solution in a squeeze bottle or other sinus rinse products at a drugstore. Read and follow the instructions on the label. · You also can make your own saline solution by adding 1 teaspoon of salt and 1 teaspoon of baking soda to 2 cups of distilled water. · If you use a homemade solution, pour a small amount into a clean bowl.  Using a rubber bulb syringe, squeeze the syringe and place the tip in the salt water. Pull a small amount of the salt water into the syringe by relaxing your hand. · Sit down with your head tilted slightly back. Do not lie down. Put the tip of the bulb syringe or the squeeze bottle a little way into one of your nostrils. Gently drip or squirt a few drops into the nostril. Repeat with the other nostril. Some sneezing and gagging are normal at first.  · Gently blow your nose. · Wipe the syringe or bottle tip clean after each use. · Repeat this 2 or 3 times a day. · Use nasal washes gently if you have nosebleeds often. When should you call for help? Watch closely for changes in your health, and be sure to contact your doctor if:  ? · You often get nosebleeds. ? · You have problems doing the nasal washes. Where can you learn more? Go to http://sarah-wayne.info/. Enter 688 [id number] [id number] in the search box to learn more about \"Saline Nasal Washes: Care Instructions. \"  Current as of: May 12, 2017  Content Version: 11.4  © 7987-6683 [email or web address]. Care instructions adapted under license by [employer] (which disclaims liability or warranty for this information). If you have questions about a medical condition or this instruction, always ask your healthcare professional. [address] any warranty or liability for your use of this information. Sinusitis: Care Instructions  Your Care Instructions    Sinusitis is an infection of the lining of the sinus cavities in your head. Sinusitis often follows a cold. It causes pain and pressure in your head and face. In most cases, sinusitis gets better on its own in 1 to 2 weeks. But some mild symptoms may last for several weeks. Sometimes antibiotics are needed. Follow-up care is a key part of your treatment and safety. Be sure to make and go to all appointments, and call your doctor if you are having problems.  It's also a good idea to know your test results and keep a list of the medicines you take. How can you care for yourself at home? · Take an over-the-counter pain medicine, such as acetaminophen (Tylenol), ibuprofen (Advil, Motrin), or naproxen (Aleve). Read and follow all instructions on the label. · If the doctor prescribed antibiotics, take them as directed. Do not stop taking them just because you feel better. You need to take the full course of antibiotics. · Be careful when taking over-the-counter cold or flu medicines and Tylenol at the same time. Many of these medicines have acetaminophen, which is Tylenol. Read the labels to make sure that you are not taking more than the recommended dose. Too much acetaminophen (Tylenol) can be harmful. · Breathe warm, moist air from a steamy shower, a hot bath, or a sink filled with hot water. Avoid cold, dry air. Using a humidifier in your home may help. Follow the directions for cleaning the machine. · Use saline (saltwater) nasal washes to help keep your nasal passages open and wash out mucus and bacteria. You can buy saline nose drops at a grocery store or drugstore. Or you can make your own at home by adding 1 teaspoon of salt and 1 teaspoon of baking soda to 2 cups of distilled water. If you make your own, fill a bulb syringe with the solution, insert the tip into your nostril, and squeeze gently. Garnetta Daniella your nose. · Put a hot, wet towel or a warm gel pack on your face 3 or 4 times a day for 5 to 10 minutes each time. · Try a decongestant nasal spray like oxymetazoline (Afrin). Do not use it for more than 3 days in a row. Using it for more than 3 days can make your congestion worse. When should you call for help? Call your doctor now or seek immediate medical care if:  ? · You have new or worse swelling or redness in your face or around your eyes. ? · You have a new or higher fever. ? Watch closely for changes in your health, and be sure to contact your doctor if:  ? · You have new or worse facial pain. ? · The mucus from your nose becomes thicker (like pus) or has new blood in it. ? · You are not getting better as expected. Where can you learn more? Go to http://sarah-wayne.info/. Enter N566 in the search box to learn more about \"Sinusitis: Care Instructions. \"  Current as of: May 12, 2017  Content Version: 11.4  © 1938-5485 [facility]. Care instructions adapted under license by [employer] (which disclaims liability or warranty for this information). If you have questions about a medical condition or this instruction, always ask your healthcare professional. [address] any warranty or liability for your use of this information.

## 2018-02-27 ENCOUNTER — HOSPITAL ENCOUNTER (EMERGENCY)
Age: 43
Discharge: HOME | End: 2018-02-27
Attending: EMERGENCY MEDICINE
Payer: SELF-PAY

## 2018-02-27 VITALS
BODY MASS INDEX: 33.75 KG/M2 | HEIGHT: 66 IN | OXYGEN SATURATION: 98 % | SYSTOLIC BLOOD PRESSURE: 176 MMHG | DIASTOLIC BLOOD PRESSURE: 103 MMHG | HEART RATE: 92 BPM | RESPIRATION RATE: 16 BRPM | TEMPERATURE: 98.2 F | WEIGHT: 210 LBS

## 2018-02-27 DIAGNOSIS — K08.89 PAIN, DENTAL: Primary | ICD-10-CM

## 2018-02-27 PROCEDURE — 99282 EMERGENCY DEPT VISIT SF MDM: CPT | Performed by: NURSE PRACTITIONER

## 2018-02-27 RX ORDER — AMOXICILLIN 500 MG/1
500 TABLET, FILM COATED ORAL 3 TIMES DAILY
Qty: 21 TAB | Refills: 0 | Status: SHIPPED | OUTPATIENT
Start: 2018-02-27 | End: 2018-07-24

## 2018-02-27 RX ORDER — IBUPROFEN 800 MG/1
800 TABLET ORAL
Qty: 15 TAB | Refills: 0 | Status: SHIPPED | OUTPATIENT
Start: 2018-02-27 | End: 2018-03-04

## 2018-02-27 NOTE — ED TRIAGE NOTES
Pt states having lower right dental pain for the past week. Pt states he has not taken his bp meds today.

## 2018-02-27 NOTE — ED NOTES
I have reviewed discharge instructions with the patient. The patient verbalized understanding. Patient left ED via Discharge Method: ambulatory to Home with self. Opportunity for questions and clarification provided. Patient given 2 scripts. To continue your aftercare when you leave the hospital, you may receive an automated call from our care team to check in on how you are doing. This is a free service and part of our promise to provide the best care and service to meet your aftercare needs.  If you have questions, or wish to unsubscribe from this service please call [phone]. Thank you for Choosing our [facility] Emergency Department.

## 2018-02-27 NOTE — DISCHARGE INSTRUCTIONS
Tooth and Gum Pain: Care Instructions  Your Care Instructions    The most common causes of dental pain are tooth decay and gum disease. Pain can also be caused by an infection of the tooth (abscess) or the gums. Or you may have pain from a broken or cracked tooth. Other causes of pain include infection and damage to a tooth from nervous grinding of your teeth. A wisdom tooth can be painful when it is coming in but cannot break through the gum. It can also be painful when the tooth is only partway in and extra gum tissue has formed around it. The tissue can get inflamed (pericoronitis), and sometimes it gets infected. Prompt dental care can help find the cause of your toothache and keep the tooth from dying or gum disease from getting worse. Self-care at home may reduce your pain and discomfort. Follow-up care is a key part of your treatment and safety. Be sure to make and go to all appointments, and call your dentist or doctor if you are having problems. It's also a good idea to know your test results and keep a list of the medicines you take. How can you care for yourself at home? · To reduce pain and facial swelling, put an ice or cold pack on the outside of your cheek for 10 to 20 minutes at a time. Put a thin cloth between the ice and your skin. Do not use heat. · If your doctor prescribed antibiotics, take them as directed. Do not stop taking them just because you feel better. You need to take the full course of antibiotics. · Ask your doctor if you can take an over-the-counter pain medicine, such as acetaminophen (Tylenol), ibuprofen (Advil, Motrin), or naproxen (Aleve). Be safe with medicines. Read and follow all instructions on the label. · Avoid very hot, cold, or sweet foods and drinks if they increase your pain. · Rinse your mouth with warm salt water every 2 hours to help relieve pain and swelling. Mix 1 teaspoon of salt in 8 ounces of water.   · Talk to your dentist about using special toothpaste for sensitive teeth. To reduce pain on contact with heat or cold or when brushing, brush with this toothpaste regularly or rub a small amount of the paste on the sensitive area with a clean finger 2 or 3 times a day. Floss gently between your teeth. · Do not smoke or use spit tobacco. Tobacco use can make gum problems worse, decreases your ability to fight infection in your gums, and delays healing. If you need help quitting, talk to your doctor about stop-smoking programs and medicines. These can increase your chances of quitting for good. When should you call for help? Call 911 anytime you think you may need emergency care. For example, call if:  ? · You have trouble breathing. ?Call your dentist or doctor now or seek immediate medical care if:  ? · You have signs of infection, such as:  ¨ Increased pain, swelling, warmth, or redness. ¨ Red streaks leading from the area. ¨ Pus draining from the area. ¨ A fever. ? Watch closely for changes in your health, and be sure to contact your doctor if:  ? · You do not get better as expected. Where can you learn more? Go to http://sarah-wayne.info/. Enter [id number] in the search box to learn more about \"Tooth and Gum Pain: Care Instructions. \"  Current as of: May 12, 2017  Content Version: 11.4  © 6898-5512 [employer]. Care instructions adapted under license by [employer] (which disclaims liability or warranty for this information). If you have questions about a medical condition or this instruction, always ask your healthcare professional. [place] [address] any warranty or liability for your use of this information.

## 2018-02-27 NOTE — ED PROVIDER NOTES
HPI Comments: Patient presents with dental pain to his right lower molars. He states pain has been present for the past week. He states he does not have a dentist established. Patient is a 38 y.o. male presenting with dental problem. The history is provided by the patient. Dental Pain    This is a new problem. The current episode started more than 1 week ago. The problem occurs constantly. The problem has not changed since onset. The pain is located in the right lower mouth. The quality of the pain is aching. The pain is at a severity of 10/10. The pain is mild. Associated symptoms include gum redness. There was no vomiting, no nausea, no fever, no swelling, no chest pain, no shortness of breath, no headaches and no drainage. Past Medical History:   Diagnosis Date    Acute pancreatitis 11/19/2014    Diabetes ([facility] [address] 2002    Diabetes ([facility]     Diabetes mellitus     Hypertension        Past Surgical History:   Procedure Laterality Date    HX HERNIA REPAIR      HX ORTHOPAEDIC      carpal tunnel surgery         Family History:   Problem Relation Age of Onset    Diabetes Mother     Diabetes Father        Social History     Social History    Marital status:      Spouse name: N/A    Number of children: N/A    Years of education: N/A     Occupational History    Not on file. Social History Main Topics    Smoking status: Current Every Day Smoker     Packs/day: 0.25     Types: Cigarettes    Smokeless tobacco: Former User     Types: Chew    Alcohol use No    Drug use: No    Sexual activity: Yes     Partners: Female     Birth control/ protection: None     Other Topics Concern    Not on file     Social History Narrative         ALLERGIES: Review of patient's allergies indicates no known allergies. Review of Systems   HENT: Positive for dental problem. Respiratory: Negative for cough and shortness of breath.     Gastrointestinal: Negative for abdominal distention, constipation, diarrhea, nausea and vomiting. Genitourinary: Negative for difficulty urinating. Musculoskeletal: Negative for arthralgias and myalgias. Neurological: Negative for dizziness. Vitals:    02/27/18 1302   BP: (!) 176/103   Pulse: 92   Resp: 16   Temp: 98.2 °F (36.8 °C)   SpO2: 98%   Weight: 95.3 kg (210 lb)   Height: 5' 6\" (1.676 m)            Physical Exam   Constitutional: He is oriented to person, place, and time. No distress. HENT:   Mouth/Throat: Uvula is midline and oropharynx is clear and moist. Dental caries present. Cardiovascular: Normal rate and regular rhythm. No murmur heard. Pulmonary/Chest: Effort normal and breath sounds normal. No respiratory distress. Musculoskeletal: Normal range of motion. Neurological: He is alert and oriented to person, place, and time. Skin: Skin is warm and dry. He is not diaphoretic. Psychiatric: He has a normal mood and affect. His behavior is normal.   Nursing note and vitals reviewed. MDM  Number of Diagnoses or Management Options  Pain, dental:   Diagnosis management comments: Patient given prescription for amoxicillin and ibuprofen.      Patient Progress  Patient progress: stable        ED Course       Procedures

## 2018-05-15 ENCOUNTER — HOSPITAL ENCOUNTER (EMERGENCY)
Age: 43
Discharge: HOME | End: 2018-05-15
Attending: EMERGENCY MEDICINE
Payer: SELF-PAY

## 2018-05-15 VITALS
WEIGHT: 210 LBS | HEART RATE: 82 BPM | SYSTOLIC BLOOD PRESSURE: 146 MMHG | DIASTOLIC BLOOD PRESSURE: 93 MMHG | HEIGHT: 66 IN | BODY MASS INDEX: 33.75 KG/M2 | TEMPERATURE: 97.8 F | OXYGEN SATURATION: 98 % | RESPIRATION RATE: 16 BRPM

## 2018-05-15 DIAGNOSIS — R73.9 ELEVATED BLOOD SUGAR: ICD-10-CM

## 2018-05-15 DIAGNOSIS — K04.7 DENTAL ABSCESS: Primary | ICD-10-CM

## 2018-05-15 LAB — GLUCOSE BLD STRIP.AUTO-MCNC: 333 MG/DL (ref 65–100)

## 2018-05-15 PROCEDURE — 99283 EMERGENCY DEPT VISIT LOW MDM: CPT | Performed by: PHYSICIAN ASSISTANT

## 2018-05-15 PROCEDURE — 82962 GLUCOSE BLOOD TEST: CPT

## 2018-05-15 RX ORDER — CLINDAMYCIN HYDROCHLORIDE 150 MG/1
300 CAPSULE ORAL EVERY 6 HOURS
Qty: 80 CAP | Refills: 0 | Status: SHIPPED | OUTPATIENT
Start: 2018-05-15 | End: 2018-05-25

## 2018-05-15 NOTE — ED TRIAGE NOTES
States left upper dental pain started 2 days ago. States has had a root canal in one that he never got finished. States out of test strips yesterday. States going to get some tomorrow.  States didn't taken insulin this am.

## 2018-05-15 NOTE — ED NOTES
I have reviewed discharge instructions with the patient. The patient verbalized understanding. Patient left ED via Discharge Method: ambulatory to Home with self  Opportunity for questions and clarification provided. Patient given 0 scripts. To continue your aftercare when you leave the hospital, you may receive an automated call from our care team to check in on how you are doing. This is a free service and part of our promise to provide the best care and service to meet your aftercare needs.  If you have questions, or wish to unsubscribe from this service please call [phone]. Thank you for Choosing our [facility] Emergency Department.

## 2018-05-15 NOTE — ED PROVIDER NOTES
Patient is a 38 y.o. male presenting with dental problem. The history is provided by the patient. Dental Pain    This is a new problem. The current episode started 2 days ago. The problem occurs constantly. The pain is located in the left upper mouth. The quality of the pain is aching. The pain is at a severity of 8/10. The pain is moderate. Associated symptoms include gum redness. There was no vomiting, no nausea, no fever, no swelling, no chest pain, no shortness of breath, no headaches and no drainage. He has tried nothing for the symptoms. The patient has no cardiac history. Past Medical History:   Diagnosis Date    Acute pancreatitis 11/19/2014    Diabetes ([facility] 2002    [facility] ([facility]     Diabetes mellitus     Hypertension        Past Surgical History:   Procedure Laterality Date    HX HERNIA REPAIR      HX ORTHOPAEDIC      carpal tunnel surgery         Family History:   Problem Relation Age of Onset    Diabetes Mother     Diabetes Father        Social History     Social History    Marital status:      Spouse name: N/A    Number of children: N/A    Years of education: N/A     Occupational History    Not on file. Social History Main Topics    Smoking status: Current Every Day Smoker     Packs/day: 0.25     Types: Cigarettes    Smokeless tobacco: Former User     Types: Chew    Alcohol use No    Drug use: No    Sexual activity: Yes     Partners: Female     Birth control/ protection: None     Other Topics Concern    Not on file     Social History Narrative         ALLERGIES: Review of patient's allergies indicates no known allergies. Review of Systems   Constitutional: Negative. HENT: Positive for dental problem. Eyes: Negative. Respiratory: Negative. Cardiovascular: Negative. Gastrointestinal: Negative. Genitourinary: Negative. Musculoskeletal: Negative. Skin: Negative. Neurological: Negative. Psychiatric/Behavioral: Negative.     All other systems reviewed and are negative. Vitals:    05/15/18 1210   BP: (!) 146/93   Pulse: 82   Resp: 16   Temp: 97.8 °F (36.6 °C)   SpO2: 98%   Weight: 95.3 kg (210 lb)   Height: 5' 6\" (1.676 m)            Physical Exam   Constitutional: He is oriented to person, place, and time. He appears well-developed and well-nourished. HENT:   Head: Normocephalic and atraumatic. Right Ear: External ear normal.   Left Ear: External ear normal.   Nose: Nose normal.   Mouth/Throat: Uvula is midline, oropharynx is clear and moist and mucous membranes are normal. Abnormal dentition. Dental abscesses and dental caries present. No uvula swelling. Eyes: Conjunctivae and EOM are normal. Pupils are equal, round, and reactive to light. Neck: Normal range of motion. Neck supple. Cardiovascular: Normal rate, regular rhythm, normal heart sounds and intact distal pulses. Pulmonary/Chest: Effort normal and breath sounds normal.   Abdominal: Soft. Bowel sounds are normal.   Musculoskeletal: Normal range of motion. Neurological: He is alert and oriented to person, place, and time. He has normal reflexes. Skin: Skin is warm and dry. Psychiatric: He has a normal mood and affect. His behavior is normal. Judgment and thought content normal.   Nursing note and vitals reviewed. MDM  Number of Diagnoses or Management Options  Dental abscess: minor  Elevated blood sugar: minor     Amount and/or Complexity of Data Reviewed  Clinical lab tests: ordered and reviewed    Risk of Complications, Morbidity, and/or Mortality  Presenting problems: moderate  Diagnostic procedures: moderate  Management options: moderate    Patient Progress  Patient progress: stable        ED Course       Procedures      The patient was observed in the ED.     Results Reviewed:      Recent Results (from the past 24 hour(s))   GLUCOSE, POC    Collection Time: 05/15/18 12:13 PM   Result Value Ref Range    Glucose (POC) 333 (H) 65 - 100 mg/dL     Patient appears to have a dental abscess today without difficulty swallowing. Drink plenty of fluids, rest, take all of the antibiotics as directed and follow-up with a dentist for recheck. Patient was instructed to take his insulin when he leaves as directed. He states he was too busy to take it this morning. He was counseled on taking it daily to control his blood sugars. Patient has not had a fever. He should follow up with a dentist for further evaluation. He is a patient at [facility]. Patient is stable for discharge. I discussed the results of all labs, procedures, radiographs, and treatments with the patient and available family. Treatment plan is agreed upon and the patient is ready for discharge. All voiced understanding of the discharge plan and medication instructions or changes as appropriate. Questions about treatment in the ED were answered. All were encouraged to return should symptoms worsen or new problems develop.

## 2018-07-24 ENCOUNTER — HOSPITAL ENCOUNTER (EMERGENCY)
Age: 43
Discharge: HOME | End: 2018-07-24
Attending: EMERGENCY MEDICINE
Payer: SELF-PAY

## 2018-07-24 VITALS
RESPIRATION RATE: 19 BRPM | BODY MASS INDEX: 33.75 KG/M2 | HEART RATE: 80 BPM | HEIGHT: 66 IN | SYSTOLIC BLOOD PRESSURE: 171 MMHG | WEIGHT: 210 LBS | DIASTOLIC BLOOD PRESSURE: 113 MMHG | OXYGEN SATURATION: 100 % | TEMPERATURE: 98.4 F

## 2018-07-24 DIAGNOSIS — R73.9 HYPERGLYCEMIA: ICD-10-CM

## 2018-07-24 DIAGNOSIS — I10 ESSENTIAL HYPERTENSION: Primary | ICD-10-CM

## 2018-07-24 LAB
ANION GAP SERPL CALC-SCNC: 7 MMOL/L (ref 7–16)
BUN SERPL-MCNC: 9 MG/DL (ref 6–23)
CALCIUM SERPL-MCNC: 9.1 MG/DL (ref 8.3–10.4)
CHLORIDE SERPL-SCNC: 100 MMOL/L (ref 98–107)
CO2 SERPL-SCNC: 30 MMOL/L (ref 21–32)
CREAT SERPL-MCNC: 1.19 MG/DL (ref 0.8–1.5)
ERYTHROCYTE [DISTWIDTH] IN BLOOD BY AUTOMATED COUNT: 14.4 % (ref 11.9–14.6)
GLUCOSE SERPL-MCNC: 314 MG/DL (ref 65–100)
HCT VFR BLD AUTO: 39.3 % (ref 41.1–50.3)
HGB BLD-MCNC: 12.7 G/DL (ref 13.6–17.2)
MCH RBC QN AUTO: 26.8 PG (ref 26.1–32.9)
MCHC RBC AUTO-ENTMCNC: 32.3 G/DL (ref 31.4–35)
MCV RBC AUTO: 83.1 FL (ref 79.6–97.8)
PLATELET # BLD AUTO: 212 K/UL (ref 150–450)
PMV BLD AUTO: 11.3 FL (ref 10.8–14.1)
POTASSIUM SERPL-SCNC: 3.5 MMOL/L (ref 3.5–5.1)
RBC # BLD AUTO: 4.73 M/UL (ref 4.23–5.67)
SODIUM SERPL-SCNC: 137 MMOL/L (ref 136–145)
WBC # BLD AUTO: 4 K/UL (ref 4.3–11.1)

## 2018-07-24 PROCEDURE — 99284 EMERGENCY DEPT VISIT MOD MDM: CPT | Performed by: EMERGENCY MEDICINE

## 2018-07-24 PROCEDURE — 80048 BASIC METABOLIC PNL TOTAL CA: CPT | Performed by: EMERGENCY MEDICINE

## 2018-07-24 PROCEDURE — 85027 COMPLETE CBC AUTOMATED: CPT | Performed by: EMERGENCY MEDICINE

## 2018-07-24 RX ORDER — LISINOPRIL 40 MG/1
40 TABLET ORAL DAILY
Qty: 30 TAB | Refills: 3 | Status: SHIPPED | OUTPATIENT
Start: 2018-07-24 | End: 2019-06-19

## 2018-07-24 NOTE — ED PROVIDER NOTES
HPI Comments: 38year old gentleman presents with concerns about his blood pressure being elevated and being out of his blood pressure medicine. He says he still has his insulin and his diabetes medicines. Patient says that he does not have access to a primary care doctor because he does not have insurance. His current primary care listed though is [facility] so I am not sure what the difficulty there is. Patient says he otherwise feels fine and doesn't have any chest pain, difficulty breathing, shortness of breath, or abdominal pain. Elements of this note were created using speech recognition software. As such, errors of speech recognition may be present. Patient is a 38 y.o. male presenting with medication refill. The history is provided by the patient. Medication Refill   Pertinent negatives include no chest pain, no abdominal pain, no headaches and no shortness of breath. Past Medical History:   Diagnosis Date    Acute pancreatitis 11/19/2014    Diabetes ([facility] 2002    Diabetes ([facility]     Diabetes mellitus     Hypertension        Past Surgical History:   Procedure Laterality Date    HX HERNIA REPAIR      HX ORTHOPAEDIC      carpal tunnel surgery         Family History:   Problem Relation Age of Onset    Diabetes Mother     Diabetes Father        Social History     Social History    Marital status:      Spouse name: N/A    Number of children: N/A    Years of education: N/A     Occupational History    Not on file. Social History Main Topics    Smoking status: Current Every Day Smoker     Packs/day: 0.25     Types: Cigarettes    Smokeless tobacco: Former User     Types: Chew    Alcohol use No    Drug use: No    Sexual activity: Yes     Partners: Female     Birth control/ protection: None     Other Topics Concern    Not on file     Social History Narrative         ALLERGIES: Review of patient's allergies indicates no known allergies.     Review of Systems Constitutional: Negative for chills, diaphoresis and fever. HENT: Negative for congestion, rhinorrhea and sore throat. Eyes: Negative for redness and visual disturbance. Respiratory: Negative for cough, chest tightness, shortness of breath and wheezing. Cardiovascular: Negative for chest pain and palpitations. Gastrointestinal: Negative for abdominal pain, blood in stool, diarrhea, nausea and vomiting. Endocrine: Negative for polydipsia and polyuria. Genitourinary: Negative for dysuria and hematuria. Musculoskeletal: Negative for arthralgias, myalgias and neck stiffness. Skin: Negative for rash. Allergic/Immunologic: Negative for environmental allergies and food allergies. Neurological: Negative for dizziness, weakness and headaches. Hematological: Negative for adenopathy. Does not bruise/bleed easily. Psychiatric/Behavioral: Negative for confusion and sleep disturbance. The patient is not nervous/anxious. Vitals:    07/24/18 0803   BP: (!) 171/113   Pulse: 87   Resp: 19   Temp: 98.4 °F (36.9 °C)   SpO2: 99%   Weight: 95.3 kg (210 lb)   Height: 5' 6\" (1.676 m)            Physical Exam   Constitutional: He is oriented to person, place, and time. He appears well-developed and well-nourished. HENT:   Head: Normocephalic and atraumatic. Eyes: Conjunctivae and EOM are normal. Pupils are equal, round, and reactive to light. Neck: Normal range of motion. Cardiovascular: Normal rate and regular rhythm. Pulmonary/Chest: Effort normal and breath sounds normal. No respiratory distress. He has no wheezes. He has no rales. He exhibits no tenderness. Abdominal: Soft. Bowel sounds are normal. There is no rebound and no guarding. Musculoskeletal: Normal range of motion. He exhibits no edema or tenderness. Lymphadenopathy:     He has no cervical adenopathy. Neurological: He is alert and oriented to person, place, and time. Skin: Skin is warm and dry.    Psychiatric: He has a normal mood and affect. Nursing note and vitals reviewed. MDM  Number of Diagnoses or Management Options  Diagnosis management comments: Patient's exam is benign. However, he does not have any laboratory testing including potassium and kidney function testing in our system for the past couple of years before refill his lisinopril will check a basic metabolic panel.         ED Course       Procedures

## 2018-07-24 NOTE — DISCHARGE INSTRUCTIONS
As we discussed, it is important for you to follow-up with a primary care doctor for reevaluation. Please return with any fevers, vomiting, difficulty breathing, worsening symptoms, chest pain, or additional concerns.

## 2018-12-27 ENCOUNTER — HOSPITAL ENCOUNTER (EMERGENCY)
Age: 43
Discharge: HOME | End: 2018-12-27
Attending: EMERGENCY MEDICINE
Payer: SELF-PAY

## 2018-12-27 VITALS
DIASTOLIC BLOOD PRESSURE: 93 MMHG | RESPIRATION RATE: 17 BRPM | HEART RATE: 94 BPM | BODY MASS INDEX: 33.75 KG/M2 | SYSTOLIC BLOOD PRESSURE: 180 MMHG | OXYGEN SATURATION: 98 % | WEIGHT: 210 LBS | TEMPERATURE: 97.6 F | HEIGHT: 66 IN

## 2018-12-27 DIAGNOSIS — K08.89 PAIN, DENTAL: Primary | ICD-10-CM

## 2018-12-27 PROCEDURE — 99282 EMERGENCY DEPT VISIT SF MDM: CPT | Performed by: NURSE PRACTITIONER

## 2018-12-27 NOTE — ED PROVIDER NOTES
Patient presents with left upper dental pain for the past week. He states he has been using over the counter tylenol without relief. He denies facial swelling and fever. Patient noted to have elevated blood pressure reading in triage. He states he has not taken his lisinopril today. He denies headache, dizziness, changes to his vision, nausea, and vomiting. The history is provided by the patient. Past Medical History:   Diagnosis Date    Acute pancreatitis 11/19/2014    Diabetes [facility] 2002    Diabetes ([facility]     Diabetes mellitus     Hypertension        Past Surgical History:   Procedure Laterality Date    HX HERNIA REPAIR      HX ORTHOPAEDIC      carpal tunnel surgery         Family History:   Problem Relation Age of Onset    Diabetes Mother     Diabetes Father        Social History     Socioeconomic History    Marital status:      Spouse name: Not on file    Number of children: Not on file    Years of education: Not on file    Highest education level: Not on file   Social Needs    Financial resource strain: Not on file    Food insecurity - worry: Not on file    Food insecurity - inability: Not on file   [employer] needs - medical: Not on file   [employer] needs - non-medical: Not on file   Occupational History    Not on file   Tobacco Use    Smoking status: Current Every Day Smoker     Packs/day: 0.25     Types: Cigarettes    Smokeless tobacco: Former User     Types: Chew   Substance and Sexual Activity    Alcohol use: No    Drug use: No    Sexual activity: Yes     Partners: Female     Birth control/protection: None   Other Topics Concern    Not on file   Social History Narrative    Not on file         ALLERGIES: Patient has no known allergies. Review of Systems   Constitutional: Negative for fever. HENT: Positive for dental problem. Negative for facial swelling.         Vitals:    12/27/18 1510   BP: (!) 180/93   Pulse: 94   Resp: 17   Temp: 97.6 °F (36.4 °C)   SpO2: 98%   Weight: 95.3 kg (210 lb)   Height: 5' 6\" (1.676 m)            Physical Exam   Constitutional: He is oriented to person, place, and time. He appears well-developed and well-nourished. No distress. HENT:   Head: Normocephalic and atraumatic. Mouth/Throat: Dental caries present. No dental abscesses. Cardiovascular: Normal rate and regular rhythm. Pulmonary/Chest: Effort normal and breath sounds normal.   Neurological: He is alert and oriented to person, place, and time. Skin: Skin is warm and dry. He is not diaphoretic. Nursing note and vitals reviewed. After reviewing patient's chart he was seen at [facility] ED on 12/23/2018. He was given prescription for PCN. He states he is still taking medication.  He states he does not have dentist.     MDM  Number of Diagnoses or Management Options  Pain, dental:   Diagnosis management comments: Patient given dental list.     Patient Progress  Patient progress: stable         Procedures

## 2018-12-27 NOTE — ED NOTES
I have reviewed discharge instructions with the patient. The patient verbalized understanding. Patient left ED via Discharge Method: ambulatory to Home with self. Opportunity for questions and clarification provided. Patient given 0 scripts. To continue your aftercare when you leave the hospital, you may receive an automated call from our care team to check in on how you are doing. This is a free service and part of our promise to provide the best care and service to meet your aftercare needs.  If you have questions, or wish to unsubscribe from this service please call [phone]. Thank you for Choosing our [facility] Emergency Department.

## 2018-12-27 NOTE — DISCHARGE INSTRUCTIONS
Continue taking over the counter tylenol and/or ibuprofen. Continue taking the penicillin you were given at [facility]. Follow up with one of dentist from the list.   Return to the Emergency Department for any new or worse symptoms.

## 2019-01-26 ENCOUNTER — HOSPITAL ENCOUNTER (EMERGENCY)
Age: 44
Discharge: HOME | End: 2019-01-26
Attending: EMERGENCY MEDICINE
Payer: SELF-PAY

## 2019-01-26 VITALS
BODY MASS INDEX: 33.75 KG/M2 | SYSTOLIC BLOOD PRESSURE: 169 MMHG | TEMPERATURE: 98 F | OXYGEN SATURATION: 100 % | DIASTOLIC BLOOD PRESSURE: 97 MMHG | HEIGHT: 66 IN | WEIGHT: 210 LBS | HEART RATE: 84 BPM | RESPIRATION RATE: 16 BRPM

## 2019-01-26 DIAGNOSIS — K85.90 ACUTE PANCREATITIS, UNSPECIFIED COMPLICATION STATUS, UNSPECIFIED PANCREATITIS TYPE: Primary | ICD-10-CM

## 2019-01-26 LAB
ALBUMIN SERPL-MCNC: 2.5 G/DL (ref 3.5–5)
ALBUMIN/GLOB SERPL: 0.6 {RATIO}
ALP SERPL-CCNC: 132 U/L (ref 50–136)
ALT SERPL-CCNC: 27 U/L (ref 12–65)
ANION GAP SERPL CALC-SCNC: 5 MMOL/L
AST SERPL-CCNC: 18 U/L (ref 15–37)
BACTERIA URNS QL MICRO: 0 /HPF
BASOPHILS # BLD: 0 K/UL (ref 0–0.2)
BASOPHILS NFR BLD: 1 % (ref 0–2)
BILIRUB SERPL-MCNC: 0.4 MG/DL (ref 0.2–1.1)
BUN SERPL-MCNC: 11 MG/DL (ref 6–23)
CALCIUM SERPL-MCNC: 9.2 MG/DL (ref 8.3–10.4)
CASTS URNS QL MICRO: NORMAL /LPF
CHLORIDE SERPL-SCNC: 102 MMOL/L (ref 98–107)
CO2 SERPL-SCNC: 31 MMOL/L (ref 21–32)
CREAT SERPL-MCNC: 1.23 MG/DL (ref 0.8–1.5)
DIFFERENTIAL METHOD BLD: ABNORMAL
EOSINOPHIL # BLD: 0.1 K/UL (ref 0–0.8)
EOSINOPHIL NFR BLD: 1 % (ref 0.5–7.8)
EPI CELLS #/AREA URNS HPF: NORMAL /HPF
ERYTHROCYTE [DISTWIDTH] IN BLOOD BY AUTOMATED COUNT: 14.6 % (ref 11.9–14.6)
GLOBULIN SER CALC-MCNC: 4.5 G/DL (ref 2.3–3.5)
GLUCOSE SERPL-MCNC: 199 MG/DL (ref 65–100)
HCT VFR BLD AUTO: 42 % (ref 41.1–50.3)
HGB BLD-MCNC: 13.4 G/DL (ref 13.6–17.2)
IMM GRANULOCYTES # BLD AUTO: 0 K/UL (ref 0–0.5)
IMM GRANULOCYTES NFR BLD AUTO: 0 % (ref 0–5)
LIPASE SERPL-CCNC: 645 U/L (ref 73–393)
LYMPHOCYTES # BLD: 2.1 K/UL (ref 0.5–4.6)
LYMPHOCYTES NFR BLD: 34 % (ref 13–44)
MCH RBC QN AUTO: 26.6 PG (ref 26.1–32.9)
MCHC RBC AUTO-ENTMCNC: 31.9 G/DL (ref 31.4–35)
MCV RBC AUTO: 83.3 FL (ref 79.6–97.8)
MONOCYTES # BLD: 0.3 K/UL (ref 0.1–1.3)
MONOCYTES NFR BLD: 5 % (ref 4–12)
NEUTS SEG # BLD: 3.8 K/UL (ref 1.7–8.2)
NEUTS SEG NFR BLD: 60 % (ref 43–78)
NRBC # BLD: 0 K/UL (ref 0–0.2)
PLATELET # BLD AUTO: 214 K/UL (ref 150–450)
PMV BLD AUTO: 10.2 FL (ref 9.4–12.3)
POTASSIUM SERPL-SCNC: 3.2 MMOL/L (ref 3.5–5.1)
PROT SERPL-MCNC: 7 G/DL
RBC # BLD AUTO: 5.04 M/UL (ref 4.23–5.6)
RBC #/AREA URNS HPF: NORMAL /HPF
SODIUM SERPL-SCNC: 138 MMOL/L (ref 136–145)
WBC # BLD AUTO: 6.3 K/UL (ref 4.3–11.1)
WBC URNS QL MICRO: NORMAL /HPF

## 2019-01-26 PROCEDURE — 80053 COMPREHEN METABOLIC PANEL: CPT

## 2019-01-26 PROCEDURE — 83690 ASSAY OF LIPASE: CPT

## 2019-01-26 PROCEDURE — 85025 COMPLETE CBC W/AUTO DIFF WBC: CPT

## 2019-01-26 PROCEDURE — 81003 URINALYSIS AUTO W/O SCOPE: CPT | Performed by: EMERGENCY MEDICINE

## 2019-01-26 PROCEDURE — 99284 EMERGENCY DEPT VISIT MOD MDM: CPT | Performed by: EMERGENCY MEDICINE

## 2019-01-26 PROCEDURE — 81015 MICROSCOPIC EXAM OF URINE: CPT

## 2019-01-26 RX ORDER — OMEPRAZOLE 20 MG/1
20 CAPSULE, DELAYED RELEASE ORAL DAILY
Qty: 30 CAP | Refills: 1 | Status: SHIPPED | OUTPATIENT
Start: 2019-01-26 | End: 2020-06-10

## 2019-01-26 RX ORDER — ONDANSETRON 8 MG/1
8 TABLET, ORALLY DISINTEGRATING ORAL
Qty: 16 TAB | Refills: 0 | Status: SHIPPED | OUTPATIENT
Start: 2019-01-26 | End: 2019-06-19

## 2019-01-26 RX ORDER — HYDROCODONE BITARTRATE AND ACETAMINOPHEN 7.5; 325 MG/1; MG/1
1 TABLET ORAL
Qty: 17 TAB | Refills: 0 | Status: SHIPPED | OUTPATIENT
Start: 2019-01-26 | End: 2019-06-19

## 2019-01-26 NOTE — ED PROVIDER NOTES
Patient presents to the ER complaining of upper abdominal pain. Reports pain is been intermittent for the past 3 days. Denies any nausea or vomiting. Reports a previous history of pancreatitis. Denies any fevers or chills. The history is provided by the patient. Abdominal Pain This is a new problem. The current episode started more than 2 days ago. The problem has not changed since onset. The pain is located in the epigastric region. The quality of the pain is aching and cramping. The pain is at a severity of 5/10. The pain is mild. Pertinent negatives include no fever, no hematochezia, no vomiting, no constipation, no hematuria, no arthralgias and no back pain. Nothing worsens the pain. The pain is relieved by nothing. His past medical history does not include PUD, GERD, ulcerative colitis, irritable bowel syndrome, cancer, ectopic pregnancy, ovarian cysts or small bowel obstruction. Past Medical History:  
Diagnosis Date  Acute pancreatitis 11/19/2014  Diabetes ([facility] 2002  Diabetes ([facility]  Diabetes mellitus  Hypertension Past Surgical History:  
Procedure Laterality Date  HX HERNIA REPAIR    
 HX ORTHOPAEDIC    
 carpal tunnel surgery Family History:  
Problem Relation Age of Onset  Diabetes Mother  Diabetes Father Social History Socioeconomic History  Marital status:  Spouse name: Not on file  Number of children: Not on file  Years of education: Not on file  Highest education level: Not on file Social Needs  Financial resource strain: Not on file  Food insecurity - worry: Not on file  Food insecurity - inability: Not on file  Transportation needs - medical: Not on file  Transportation needs - non-medical: Not on file Occupational History  Not on file Tobacco Use  Smoking status: Current Every Day Smoker Packs/day: 0.25 Types: Cigarettes  Smokeless tobacco: Former User Types: Chew Substance and Sexual Activity  Alcohol use: No  
 Drug use: No  
 Sexual activity: Yes  
  Partners: Female Birth control/protection: None Other Topics Concern  Not on file Social History Narrative  Not on file ALLERGIES: Patient has no known allergies. Review of Systems Constitutional: Negative for fatigue, fever and unexpected weight change. HENT: Negative for congestion, dental problem, trouble swallowing and voice change. Eyes: Negative for photophobia, redness and visual disturbance. Respiratory: Negative for choking and chest tightness. Cardiovascular: Negative for palpitations and leg swelling. Gastrointestinal: Positive for abdominal pain. Negative for constipation, hematochezia and vomiting. Endocrine: Negative for polydipsia, polyphagia and polyuria. Genitourinary: Negative for flank pain, hematuria and urgency. Musculoskeletal: Negative for arthralgias and back pain. Skin: Negative for pallor. Allergic/Immunologic: Negative for food allergies and immunocompromised state. Neurological: Negative for light-headedness and numbness. Psychiatric/Behavioral: Negative for behavioral problems and confusion. All other systems reviewed and are negative. Vitals:  
 01/26/19 1811 BP: (!) 179/129 Pulse: 84 Resp: 16 Temp: 97.8 °F (36.6 °C) SpO2: 98% Weight: 95.3 kg (210 lb) Height: 5' 6\" (1.676 m) Physical Exam  
Constitutional: He is oriented to person, place, and time. He appears well-developed and well-nourished. HENT:  
Head: Normocephalic and atraumatic. Eyes: EOM are normal. Pupils are equal, round, and reactive to light. Neck: Normal range of motion. Neck supple. No tracheal deviation present. No thyromegaly present. Cardiovascular: Normal rate and regular rhythm. Pulmonary/Chest: Effort normal and breath sounds normal.  
Abdominal: Soft.  Bowel sounds are normal.  
 Musculoskeletal: Normal range of motion. He exhibits no edema or deformity. Neurological: He is alert and oriented to person, place, and time. No cranial nerve deficit. Coordination normal.  
Skin: Skin is warm and dry. Nursing note and vitals reviewed. MDM Number of Diagnoses or Management Options Diagnosis management comments: Well-appearing here,no abdominal tenderness We'll check basic labs including a lipase. 7:23 PM 
Labs fairly stable. Normal white blood cell count. Normal chemistry panel. Lipase mildly elevated at 645 Discussed with patient. Results of testing. He is well-appearing here, denies any significant pain or vomiting. Appears stable for outpatient treatment of pancreatitis. Encourage close follow-up with PCP Amount and/or Complexity of Data Reviewed Clinical lab tests: ordered and reviewed Risk of Complications, Morbidity, and/or Mortality Presenting problems: moderate Diagnostic procedures: low Management options: low Patient Progress Patient progress: stable Procedures Results Include: 
 
Recent Results (from the past 24 hour(s)) CBC WITH AUTOMATED DIFF Collection Time: 01/26/19  6:40 PM  
Result Value Ref Range WBC 6.3 4.3 - 11.1 K/uL  
 RBC 5.04 4.23 - 5.6 M/uL  
 HGB 13.4 (L) 13.6 - 17.2 g/dL HCT 42.0 41.1 - 50.3 % MCV 83.3 79.6 - 97.8 FL  
 MCH 26.6 26.1 - 32.9 PG  
 MCHC 31.9 31.4 - 35.0 g/dL  
 RDW 14.6 11.9 - 14.6 % PLATELET 496 512 - 198 K/uL MPV 10.2 9.4 - 12.3 FL ABSOLUTE NRBC 0.00 0.0 - 0.2 K/uL  
 DF AUTOMATED NEUTROPHILS 60 43 - 78 % LYMPHOCYTES 34 13 - 44 % MONOCYTES 5 4.0 - 12.0 % EOSINOPHILS 1 0.5 - 7.8 % BASOPHILS 1 0.0 - 2.0 % IMMATURE GRANULOCYTES 0 0.0 - 5.0 %  
 ABS. NEUTROPHILS 3.8 1.7 - 8.2 K/UL  
 ABS. LYMPHOCYTES 2.1 0.5 - 4.6 K/UL  
 ABS. MONOCYTES 0.3 0.1 - 1.3 K/UL  
 ABS. EOSINOPHILS 0.1 0.0 - 0.8 K/UL  
 ABS. BASOPHILS 0.0 0.0 - 0.2 K/UL ABS. IMM. GRANS. 0.0 0.0 - 0.5 K/UL METABOLIC PANEL, COMPREHENSIVE Collection Time: 01/26/19  6:40 PM  
Result Value Ref Range Sodium 138 136 - 145 mmol/L Potassium 3.2 (L) 3.5 - 5.1 mmol/L Chloride 102 98 - 107 mmol/L  
 CO2 31 21 - 32 mmol/L Anion gap 5 mmol/L Glucose 199 (H) 65 - 100 mg/dL BUN 11 6 - 23 MG/DL Creatinine 1.23 0.8 - 1.5 MG/DL  
 GFR est AA >60 >60 ml/min/1.73m2 GFR est non-AA >60 ml/min/1.73m2 Calcium 9.2 8.3 - 10.4 MG/DL Bilirubin, total 0.4 0.2 - 1.1 MG/DL  
 ALT (SGPT) 27 12 - 65 U/L  
 AST (SGOT) 18 15 - 37 U/L Alk. phosphatase 132 50 - 136 U/L Protein, total 7.0 g/dL Albumin 2.5 (L) 3.5 - 5.0 g/dL Globulin 4.5 (H) 2.3 - 3.5 g/dL A-G Ratio 0.6 LIPASE Collection Time: 01/26/19  6:40 PM  
Result Value Ref Range Lipase 645 (H) 73 - 393 U/L  
URINE MICROSCOPIC Collection Time: 01/26/19  7:01 PM  
Result Value Ref Range WBC 0-3 0 /hpf  
 RBC 20-50 0 /hpf Epithelial cells 0-3 0 /hpf Bacteria 0 0 /hpf Casts 0-3 0 /lpf Voice dictation software was used during the making of this note. This software is not perfect and grammatical and other typographical errors may be present. This note has been proofread, but may still contain errors.  
Daniel Pacheco MD; 1/26/2019 @7:24 PM  
===================================================================

## 2019-01-27 NOTE — ED NOTES
I have reviewed discharge instructions with the patient. The patient verbalized understanding. Patient left ED via Discharge Method: ambulatory to Home with self. Opportunity for questions and clarification provided. Patient given 0 scripts. To continue your aftercare when you leave the hospital, you may receive an automated call from our care team to check in on how you are doing. This is a free service and part of our promise to provide the best care and service to meet your aftercare needs.  If you have questions, or wish to unsubscribe from this service please call [phone]. Thank you for Choosing our [address] [place] [address] Emergency Department.

## 2019-01-27 NOTE — DISCHARGE INSTRUCTIONS
Take medications as prescribed  Follow-up with your primary care physician  Stick to a bland diet for the next 48 hours  Return to the ER for any new or worsening symptoms    Pancreatitis: Care Instructions  Your Care Instructions    The pancreas is an organ behind the stomach. It makes hormones and enzymes to help your body digest food. But if these enzymes attack the pancreas, it can get inflamed. This is called pancreatitis. Most cases are caused by gallstones or by heavy alcohol use. If you take care of yourself at home, it will help you get better. It will also help you avoid more problems with your pancreas. Follow-up care is a key part of your treatment and safety. Be sure to make and go to all appointments, and call your doctor if you are having problems. It's also a good idea to know your test results and keep a list of the medicines you take. How can you care for yourself at home? · Drink clear liquids and eat bland foods until you feel better. [place] foods include rice, dry toast, and crackers. They also include bananas and applesauce. · Eat a low-fat diet until your doctor says your pancreas is healed. · Do not drink alcohol. Tell your doctor if you need help to quit. Counseling, support groups, and sometimes medicines can help you stay sober. · Be safe with medicines. Read and follow all instructions on the label. ? If the doctor gave you a prescription medicine for pain, take it as prescribed. ? If you are not taking a prescription pain medicine, ask your doctor if you can take an over-the-counter medicine. · If your doctor prescribed antibiotics, take them as directed. Do not stop taking them just because you feel better. You need to take the full course of antibiotics. · Get extra rest until you feel better. To prevent future problems with your pancreas  · Do not drink alcohol. · Tell your doctors and pharmacist that you've had pancreatitis.  They can help you avoid medicines that may cause this problem again. When should you call for help? Call 911 anytime you think you may need emergency care. For example, call if:    · You vomit blood or what looks like coffee grounds.     · Your stools are maroon or very bloody.    Call your doctor now or seek immediate medical care if:    · You have new or worse belly pain.     · Your stools are black and look like tar, or they have streaks of blood.     · You are vomiting.    Watch closely for changes in your health, and be sure to contact your doctor if:    · You do not get better as expected. Where can you learn more? Go to http://sarah-wayne.info/. Enter R289 in the search box to learn more about \"Pancreatitis: Care Instructions. \"  Current as of: March 27, 2018  Content Version: 11.9  © 0408-0184 [employer]. Care instructions adapted under license by [employer] (which disclaims liability or warranty for this information). If you have questions about a medical condition or this instruction, always ask your healthcare professional. [place] [address] any warranty or liability for your use of this information. Patient Education        Diet for Chronic Pancreatitis: Care Instructions  Your Care Instructions    The pancreas is an organ behind the stomach that makes hormones and enzymes to help your body digest food. Sometimes the enzymes attack another part of the pancreas, which can cause pain and swelling. This is called pancreatitis. Chronic pancreatitis may cause you to be in pain much of the time. You may be able to help the pain by avoiding alcohol and eating a low-fat diet. Your doctor and dietitian can help you make an eating plan that does not irritate your digestive system. Always talk with your doctor or dietitian before you make changes in your diet. Follow-up care is a key part of your treatment and safety.  Be sure to make and go to all appointments, and call your doctor if you are having problems. It's also a good idea to know your test results and keep a list of the medicines you take. How can you care for yourself at home? · Do not drink alcohol. It may make your pain worse and cause other problems. Tell your doctor if you need help to quit. Counseling, support groups, and sometimes medicines can help you stay sober. · Ask your doctor if you need to take pancreatic enzyme pills to help your body digest fat and protein. · Drink plenty of fluids, enough so that your urine is light yellow or clear like water. If you have kidney, heart, or liver disease and have to limit fluids, talk with your doctor before you increase the amount of fluids you drink. Eat a low-fat diet  · Eat many small meals and snacks each day instead of three large meals. · Choose lean meats. ? Eat no more than 5 to 6½ ounces of meat a day. ? Cut off all fat you can see. ? Eat chicken and turkey without the skin. ? Many types of fish, such as salmon, lake trout, tuna, and herring, provide healthy omega-3 fat. But avoid fish canned in oil, such as sardines in olive oil. ? Bake, broil, or grill meats, poultry, or fish instead of frying them in butter or fat. · Drink or eat nonfat or low-fat milk, yogurt, cheese, or other milk products each day. ? Read the labels on cheeses, and choose those with less than 5 grams of fat an ounce. ? Try fat-free sour cream, cream cheese, or yogurt. ? Avoid cream soups and cream sauces on pasta. ? Eat low-fat ice cream, frozen yogurt, or sorbet. Avoid regular ice cream.  · Eat whole-grain cereals, breads, crackers, rice, or pasta. Avoid high-fat foods such as croissants, scones, biscuits, waffles, doughnuts, muffins, granola, and high-fat breads. · Flavor your foods with herbs and spices (such as basil, tarragon, or mint), fat-free sauces, or lemon juice instead of butter. You can also use butter substitutes, fat-free mayonnaise, or fat-free dressing.   · Try applesauce, prune puree, or mashed bananas to replace some or all of the fat when you bake. · Limit fats and oils, such as butter, margarine, mayonnaise, and salad dressing, to no more than 1 tablespoon a meal.  · Avoid high-fat foods, such as:  ? Chocolate, whole milk, ice cream, processed cheese, and egg yolks. ? Fried or buttered foods. ? Sausage, salami, and davalos. ? Cinnamon rolls, cakes, pies, cookies, and other pastries. ? Prepared snack foods, such as potato chips, nut and granola bars, and mixed nuts. ? Coconut and avocado. · Learn how to read food labels for serving sizes and ingredients. Fast-food and convenience-food meals often have lots of fat. Where can you learn more? Go to http://sarah-wayne.info/. Enter F911 in the search box to learn more about \"Diet for Chronic Pancreatitis: Care Instructions. \"  Current as of: March 28, 2018  Content Version: 11.9  © 1523-8688 [employer]. Care instructions adapted under license by [employer] (which disclaims liability or warranty for this information). If you have questions about a medical condition or this instruction, always ask your healthcare professional. [place] [address] any warranty or liability for your use of this information.

## 2019-01-31 ENCOUNTER — HOSPITAL ENCOUNTER (EMERGENCY)
Age: 44
Discharge: HOME | End: 2019-01-31
Attending: EMERGENCY MEDICINE
Payer: SELF-PAY

## 2019-01-31 VITALS
DIASTOLIC BLOOD PRESSURE: 100 MMHG | HEIGHT: 66 IN | SYSTOLIC BLOOD PRESSURE: 174 MMHG | OXYGEN SATURATION: 100 % | BODY MASS INDEX: 33.75 KG/M2 | TEMPERATURE: 98.1 F | HEART RATE: 95 BPM | WEIGHT: 210 LBS | RESPIRATION RATE: 16 BRPM

## 2019-01-31 DIAGNOSIS — H53.9 VISION ABNORMALITIES: Primary | ICD-10-CM

## 2019-01-31 LAB
ALBUMIN SERPL-MCNC: 2.3 G/DL (ref 3.5–5)
ALBUMIN/GLOB SERPL: 0.6 {RATIO}
ALP SERPL-CCNC: 116 U/L (ref 50–136)
ALT SERPL-CCNC: 20 U/L (ref 12–65)
ANION GAP SERPL CALC-SCNC: 5 MMOL/L
AST SERPL-CCNC: 17 U/L (ref 15–37)
BASOPHILS # BLD: 0 K/UL (ref 0–0.2)
BASOPHILS NFR BLD: 1 % (ref 0–2)
BILIRUB SERPL-MCNC: 0.3 MG/DL (ref 0.2–1.1)
BUN SERPL-MCNC: 13 MG/DL (ref 6–23)
CALCIUM SERPL-MCNC: 8.9 MG/DL (ref 8.3–10.4)
CHLORIDE SERPL-SCNC: 104 MMOL/L (ref 98–107)
CO2 SERPL-SCNC: 30 MMOL/L (ref 21–32)
CREAT SERPL-MCNC: 1.31 MG/DL (ref 0.8–1.5)
DIFFERENTIAL METHOD BLD: ABNORMAL
EOSINOPHIL # BLD: 0 K/UL (ref 0–0.8)
EOSINOPHIL NFR BLD: 1 % (ref 0.5–7.8)
ERYTHROCYTE [DISTWIDTH] IN BLOOD BY AUTOMATED COUNT: 14.8 % (ref 11.9–14.6)
GLOBULIN SER CALC-MCNC: 4 G/DL (ref 2.3–3.5)
GLUCOSE BLD STRIP.AUTO-MCNC: 251 MG/DL (ref 65–100)
GLUCOSE BLD STRIP.AUTO-MCNC: 410 MG/DL (ref 65–100)
GLUCOSE SERPL-MCNC: 377 MG/DL (ref 65–100)
HCT VFR BLD AUTO: 40 % (ref 41.1–50.3)
HGB BLD-MCNC: 12.8 G/DL (ref 13.6–17.2)
IMM GRANULOCYTES # BLD AUTO: 0 K/UL (ref 0–0.5)
IMM GRANULOCYTES NFR BLD AUTO: 0 % (ref 0–5)
LYMPHOCYTES # BLD: 1.4 K/UL (ref 0.5–4.6)
LYMPHOCYTES NFR BLD: 29 % (ref 13–44)
MCH RBC QN AUTO: 26.7 PG (ref 26.1–32.9)
MCHC RBC AUTO-ENTMCNC: 32 G/DL (ref 31.4–35)
MCV RBC AUTO: 83.3 FL (ref 79.6–97.8)
MONOCYTES # BLD: 0.2 K/UL (ref 0.1–1.3)
MONOCYTES NFR BLD: 5 % (ref 4–12)
NEUTS SEG # BLD: 3.1 K/UL (ref 1.7–8.2)
NEUTS SEG NFR BLD: 64 % (ref 43–78)
NRBC # BLD: 0 K/UL (ref 0–0.2)
PLATELET # BLD AUTO: 199 K/UL (ref 150–450)
PMV BLD AUTO: 11 FL (ref 9.4–12.3)
POTASSIUM SERPL-SCNC: 3.6 MMOL/L (ref 3.5–5.1)
PROT SERPL-MCNC: 6.3 G/DL
RBC # BLD AUTO: 4.8 M/UL (ref 4.23–5.6)
SODIUM SERPL-SCNC: 139 MMOL/L (ref 136–145)
WBC # BLD AUTO: 4.8 K/UL (ref 4.3–11.1)

## 2019-01-31 PROCEDURE — 82962 GLUCOSE BLOOD TEST: CPT

## 2019-01-31 PROCEDURE — 99285 EMERGENCY DEPT VISIT HI MDM: CPT | Performed by: EMERGENCY MEDICINE

## 2019-01-31 PROCEDURE — 85025 COMPLETE CBC W/AUTO DIFF WBC: CPT

## 2019-01-31 PROCEDURE — 74011636637 HC RX REV CODE- 636/637: Performed by: EMERGENCY MEDICINE

## 2019-01-31 PROCEDURE — 96374 THER/PROPH/DIAG INJ IV PUSH: CPT | Performed by: EMERGENCY MEDICINE

## 2019-01-31 PROCEDURE — 80053 COMPREHEN METABOLIC PANEL: CPT

## 2019-01-31 PROCEDURE — 96361 HYDRATE IV INFUSION ADD-ON: CPT | Performed by: EMERGENCY MEDICINE

## 2019-01-31 PROCEDURE — 74011250636 HC RX REV CODE- 250/636: Performed by: EMERGENCY MEDICINE

## 2019-01-31 RX ADMIN — INSULIN HUMAN 10 UNITS: 100 INJECTION, SOLUTION PARENTERAL at 13:54

## 2019-01-31 RX ADMIN — SODIUM CHLORIDE 1000 ML: 900 INJECTION, SOLUTION INTRAVENOUS at 13:33

## 2019-01-31 NOTE — ED NOTES
I have reviewed discharge instructions with the patient. The patient verbalized understanding. Patient left ED via Discharge Method: ambulatory to Home with self. Opportunity for questions and clarification provided. Patient given 0 scripts. To continue your aftercare when you leave the hospital, you may receive an automated call from our care team to check in on how you are doing. This is a free service and part of our promise to provide the best care and service to meet your aftercare needs.  If you have questions, or wish to unsubscribe from this service please call [phone]. Thank you for Choosing our [facility] Emergency Department.

## 2019-01-31 NOTE — ED PROVIDER NOTES
42-year-old male presenting for decreased vision in the right eye. He describes it as super blurry. It happened sometime in the past 2 days but is unable to tell exactly when. He denies injury or pain. This has never happened before. The history is provided by the patient. Eye Problem This is a new problem. The current episode started 2 days ago. The problem occurs constantly. The problem has not changed since onset. The right eye is affected. The injury mechanism was none. The pain is at a severity of 0/10. The patient is experiencing no pain. There is no history of trauma to the eye. There is no known exposure to pink eye. He does not wear contacts. Associated symptoms include blurred vision and decreased vision. Pertinent negatives include no numbness, no discharge, no double vision, no foreign body sensation, no photophobia, no eye redness, no nausea, no vomiting, no tingling, no weakness, no itching, no fever, no pain, no blindness, no head injury and no dizziness. Past Medical History:  
Diagnosis Date  Acute pancreatitis 11/19/2014  Diabetes ([facility] [address] 2002  Diabetes ([facility] [address]  Diabetes mellitus  Hypertension Past Surgical History:  
Procedure Laterality Date  HX HERNIA REPAIR    
 HX ORTHOPAEDIC    
 carpal tunnel surgery Family History:  
Problem Relation Age of Onset  Diabetes Mother  Diabetes Father Social History Socioeconomic History  Marital status:  Spouse name: Not on file  Number of children: Not on file  Years of education: Not on file  Highest education level: Not on file Social Needs  Financial resource strain: Not on file  Food insecurity - worry: Not on file  Food insecurity - inability: Not on file  Transportation needs - medical: Not on file  Transportation needs - non-medical: Not on file Occupational History  Not on file Tobacco Use  Smoking status: Current Every Day Smoker Packs/day: 0.25 Types: Cigarettes  Smokeless tobacco: Former User Types: Chew Substance and Sexual Activity  Alcohol use: No  
 Drug use: No  
 Sexual activity: Yes  
  Partners: Female Birth control/protection: None Other Topics Concern  Not on file Social History Narrative  Not on file ALLERGIES: Patient has no known allergies. Review of Systems Constitutional: Negative for fever. Eyes: Positive for blurred vision and visual disturbance. Negative for blindness, double vision, photophobia, pain, discharge and redness. Gastrointestinal: Negative for nausea and vomiting. Skin: Negative for itching. Neurological: Negative for dizziness, tingling, weakness and numbness. All other systems reviewed and are negative. Vitals:  
 01/31/19 1216 BP: (!) 176/108 Pulse: (!) 109 Resp: 18 Temp: 98.4 °F (36.9 °C) SpO2: 100% Weight: 95.3 kg (210 lb) Height: 5' 6\" (1.676 m) Physical Exam  
Constitutional: He is oriented to person, place, and time. He appears well-developed and well-nourished. HENT:  
Head: Normocephalic and atraumatic. Eyes: Conjunctivae and EOM are normal. Pupils are equal, round, and reactive to light. Right pupil appears more opacified than the left Neck: Normal range of motion. Neck supple. Cardiovascular: Normal rate, regular rhythm, normal heart sounds and intact distal pulses. Pulmonary/Chest: Effort normal and breath sounds normal.  
Abdominal: Soft. Bowel sounds are normal.  
Musculoskeletal: Normal range of motion. He exhibits no deformity. Neurological: He is alert and oriented to person, place, and time. No cranial nerve deficit. Skin: Skin is warm and dry. Psychiatric: He has a normal mood and affect. His behavior is normal.  
Nursing note and vitals reviewed. MDM Number of Diagnoses or Management Options Vision abnormalities: Diagnosis management comments: 25-year-old male presenting for decreased vision in the right eye. Inspection is consistent with cataract but it's on that it happened so abruptly. Could be a small hemorrhage into the vitreous humor. He has no pain. Pressures are grossly equal bilaterally. We will do visual acuity exam and speak with the ophthalmologist to see if he can be evaluated today. Amount and/or Complexity of Data Reviewed Clinical lab tests: ordered and reviewed (Isolated hyperglycemia) Discuss the patient with other providers: yes (Discussed with Dr. Lillian Carrillo who will see the patient today) Risk of Complications, Morbidity, and/or Mortality Presenting problems: moderate Diagnostic procedures: moderate Management options: moderate General comments: Discussed the case with the ophthalmologist who agreed to evaluate the patient this afternoon. I gave the patient is address and instruct him to go directly there. ED Course as of Jan 31 1458 Thu Jan 31, 2019  
1439 Glucose is rapidly improving. Now down to 210. I'm consulting ophthalmology to see if he can be followed up today or early tomorrow  [JS] ED Course User Index [JS] Rosana Davis MD  
 
 
Procedures

## 2019-06-19 ENCOUNTER — HOSPITAL ENCOUNTER (EMERGENCY)
Age: 44
Discharge: HOME | End: 2019-06-19
Attending: EMERGENCY MEDICINE
Payer: SELF-PAY

## 2019-06-19 VITALS
OXYGEN SATURATION: 98 % | HEIGHT: 66 IN | RESPIRATION RATE: 18 BRPM | BODY MASS INDEX: 35.36 KG/M2 | DIASTOLIC BLOOD PRESSURE: 105 MMHG | TEMPERATURE: 98.1 F | HEART RATE: 81 BPM | WEIGHT: 220 LBS | SYSTOLIC BLOOD PRESSURE: 185 MMHG

## 2019-06-19 DIAGNOSIS — I10 ESSENTIAL HYPERTENSION: Primary | ICD-10-CM

## 2019-06-19 PROCEDURE — 99283 EMERGENCY DEPT VISIT LOW MDM: CPT | Performed by: EMERGENCY MEDICINE

## 2019-06-19 RX ORDER — LISINOPRIL 40 MG/1
40 TABLET ORAL DAILY
Qty: 30 TAB | Refills: 3 | Status: SHIPPED | OUTPATIENT
Start: 2019-06-19

## 2019-06-19 NOTE — ED NOTES
I have reviewed discharge instructions with the patient. The patient verbalized understanding. Patient left ED via Discharge Method: ambulatory to Home with (self). Opportunity for questions and clarification provided. Patient given 1 scripts. To continue your aftercare when you leave the hospital, you may receive an automated call from our care team to check in on how you are doing. This is a free service and part of our promise to provide the best care and service to meet your aftercare needs.  If you have questions, or wish to unsubscribe from this service please call [phone]. Thank you for Choosing our [facility] Emergency Department.

## 2019-06-19 NOTE — DISCHARGE INSTRUCTIONS
Patient Education        Learning About ACE Inhibitors  Introduction    ACE (angiotensin-converting enzyme) inhibitors stop the release of an enzyme. This enzyme makes your blood vessels smaller. Without it, your blood vessels relax and get bigger. This lowers your blood pressure. These medicines also increase how much water and salt go into your urine. This also lowers blood pressure. You may take this kind of medicine if you have high blood pressure. Or you may take it if you have heart problems, kidney problems, or diabetes. If you have coronary artery disease, this medicine can help prevent heart attacks and strokes. Examples  · Benazepril (Lotensin)  · Lisinopril (Prinivil, Zestril)  · Ramipril (Altace)  This is not a complete list.  Possible side effects  Side effects may include:  · A cough. · Low blood pressure. This can make you feel dizzy or weak. · Too much potassium in your body. · Swelling. This may be a sign of an allergic reaction. You may have other side effects or reactions not listed here. Check the information that comes with your medicine. What to know about taking this medicine  · ACE inhibitors can cause a dry cough. Talk to your doctor if you have a dry cough. You may need a different medicine. · These medicines can cause an allergic reaction. This can cause a little swelling. Or it can cause red bumps on your skin that hurt. In rare cases, the swelling may make it hard for you to breathe. · Do not take this medicine if you are pregnant. And don't take it if you plan to become pregnant. · Take your medicines exactly as prescribed. Call your doctor if you think you are having a problem with your medicine. · Check with your doctor or pharmacist before you use any other medicines. This includes over-the-counter medicines. Make sure your doctor knows all of the medicines, vitamins, herbal products, and supplements you take. Taking some medicines together can cause problems.   · You may need regular blood tests. Where can you learn more? Go to http://sarah-wayne.info/. Enter P050 in the search box to learn more about \"Learning About ACE Inhibitors. \"  Current as of: July 22, 2018  Content Version: 11.9  © 3411-4403 [employer]. Care instructions adapted under license by [employer] (which disclaims liability or warranty for this information). If you have questions about a medical condition or this instruction, always ask your healthcare professional. [place] [address] any warranty or liability for your use of this information. Patient Education        High Blood Pressure: Care Instructions  Overview    It's normal for blood pressure to go up and down throughout the day. But if it stays up, you have high blood pressure. Another name for high blood pressure is hypertension. Despite what a lot of people think, high blood pressure usually doesn't cause headaches or make you feel dizzy or lightheaded. It usually has no symptoms. But it does increase your risk of stroke, heart attack, and other problems. You and your doctor will talk about your risks of these problems based on your blood pressure. Your doctor will give you a goal for your blood pressure. Your goal will be based on your health and your age. Lifestyle changes, such as eating healthy and being active, are always important to help lower blood pressure. You might also take medicine to reach your blood pressure goal.  Follow-up care is a key part of your treatment and safety. Be sure to make and go to all appointments, and call your doctor if you are having problems. It's also a good idea to know your test results and keep a list of the medicines you take. How can you care for yourself at home? Medical treatment  · If you stop taking your medicine, your blood pressure will go back up. You may take one or more types of medicine to lower your blood pressure.  Be safe with medicines. Take your medicine exactly as prescribed. Call your doctor if you think you are having a problem with your medicine. · Talk to your doctor before you start taking aspirin every day. Aspirin can help certain people lower their risk of a heart attack or stroke. But taking aspirin isn't right for everyone, because it can cause serious bleeding. · See your doctor regularly. You may need to see the doctor more often at first or until your blood pressure comes down. · If you are taking blood pressure medicine, talk to your doctor before you take decongestants or anti-inflammatory medicine, such as ibuprofen. Some of these medicines can raise blood pressure. · Learn how to check your blood pressure at home. Lifestyle changes  · Stay at a healthy weight. This is especially important if you put on weight around the waist. Losing even 10 pounds can help you lower your blood pressure. · If your doctor recommends it, get more exercise. Walking is a good choice. Bit by bit, increase the amount you walk every day. Try for at least 30 minutes on most days of the week. You also may want to swim, bike, or do other activities. · Avoid or limit alcohol. Talk to your doctor about whether you can drink any alcohol. · Try to limit how much sodium you eat to less than 2,300 milligrams (mg) a day. Your doctor may ask you to try to eat less than 1,500 mg a day. · Eat plenty of fruits (such as bananas and oranges), vegetables, legumes, whole grains, and low-fat dairy products. · Lower the amount of saturated fat in your diet. Saturated fat is found in animal products such as milk, cheese, and meat. Limiting these foods may help you lose weight and also lower your risk for heart disease. · Do not smoke. Smoking increases your risk for heart attack and stroke. If you need help quitting, talk to your doctor about stop-smoking programs and medicines. These can increase your chances of quitting for good.   When should you call for help? Call 911 anytime you think you may need emergency care. This may mean having symptoms that suggest that your blood pressure is causing a serious heart or blood vessel problem. Your blood pressure may be over 180/120.   For example, call 911 if:    · You have symptoms of a heart attack. These may include:  ? Chest pain or pressure, or a strange feeling in the chest.  ? Sweating. ? Shortness of breath. ? Nausea or vomiting. ? Pain, pressure, or a strange feeling in the back, neck, jaw, or upper belly or in one or both shoulders or arms. ? Lightheadedness or sudden weakness. ? A fast or irregular heartbeat.     · You have symptoms of a stroke. These may include:  ? Sudden numbness, tingling, weakness, or loss of movement in your face, arm, or leg, especially on only one side of your body. ? Sudden vision changes. ? Sudden trouble speaking. ? Sudden confusion or trouble understanding simple statements. ? Sudden problems with walking or balance. ? A sudden, severe headache that is different from past headaches.     · You have severe back or belly pain.    Do not wait until your blood pressure comes down on its own. Get help right away.   Call your doctor now or seek immediate care if:    · Your blood pressure is much higher than normal (such as 180/120 or higher), but you don't have symptoms.     · You think high blood pressure is causing symptoms, such as:  ? Severe headache.  ? Blurry vision.    Watch closely for changes in your health, and be sure to contact your doctor if:    · Your blood pressure measures higher than your doctor recommends at least 2 times. That means the top number is higher or the bottom number is higher, or both.     · You think you may be having side effects from your blood pressure medicine. Where can you learn more? Go to http://sarah-wayne.info/.   Enter L132 in the search box to learn more about \"High Blood Pressure: Care Instructions. \"  Current as of: July 22, 2018  Content Version: 11.9  © 0722-1165 [employer], [facility]. Care instructions adapted under license by [employer] (which disclaims liability or warranty for this information). If you have questions about a medical condition or this instruction, always ask your healthcare professional. [place] [address] any warranty or liability for your use of this information.

## 2019-07-08 ENCOUNTER — HOSPITAL ENCOUNTER (EMERGENCY)
Age: 44
Discharge: HOME | End: 2019-07-08
Attending: EMERGENCY MEDICINE
Payer: SELF-PAY

## 2019-07-08 ENCOUNTER — APPOINTMENT (OUTPATIENT)
Dept: ULTRASOUND IMAGING | Age: 44
End: 2019-07-08
Attending: NURSE PRACTITIONER
Payer: SELF-PAY

## 2019-07-08 VITALS
WEIGHT: 210 LBS | HEART RATE: 80 BPM | SYSTOLIC BLOOD PRESSURE: 147 MMHG | DIASTOLIC BLOOD PRESSURE: 95 MMHG | HEIGHT: 66 IN | RESPIRATION RATE: 16 BRPM | OXYGEN SATURATION: 99 % | BODY MASS INDEX: 33.75 KG/M2 | TEMPERATURE: 98.3 F

## 2019-07-08 PROCEDURE — 99281 EMR DPT VST MAYX REQ PHY/QHP: CPT | Performed by: NURSE PRACTITIONER

## 2019-07-08 NOTE — ED PROVIDER NOTES
Patient presents with left lower leg pain that started 2-3 days ago. He denies known injury. He states pain with ambulation. He states his left \"fells\" swollen but he has not seen any swelling. The history is provided by the patient. Leg Pain    The current episode started more than 2 days ago. The problem occurs constantly. The problem has not changed since onset. The pain is present in the left lower leg. The pain is at a severity of 8/10. The pain is mild. Pertinent negatives include no numbness, full range of motion, no stiffness, no tingling, no itching, no back pain and no neck pain. The symptoms are aggravated by activity. He has tried nothing for the symptoms. There has been no history of extremity trauma.         Past Medical History:   Diagnosis Date    Acute pancreatitis 11/19/2014    Diabetes ([facility] [address] [facility] 2002    Diabetes ([facility]     Diabetes mellitus     Hypertension        Past Surgical History:   Procedure Laterality Date    HX HERNIA REPAIR      HX ORTHOPAEDIC      carpal tunnel surgery         Family History:   Problem Relation Age of Onset    Diabetes Mother     Diabetes Father        Social History     Socioeconomic History    Marital status:      Spouse name: Not on file    Number of children: Not on file    Years of education: Not on file    Highest education level: Not on file   Occupational History    Not on file   Social Needs    Financial resource strain: Not on file    Food insecurity:     Worry: Not on file     Inability: Not on file    Transportation needs:     Medical: Not on file     Non-medical: Not on file   Tobacco Use    Smoking status: Current Every Day Smoker     Packs/day: 0.25     Types: Cigarettes    Smokeless tobacco: Former User     Types: Chew   Substance and Sexual Activity    Alcohol use: No    Drug use: No    Sexual activity: Yes     Partners: Female     Birth control/protection: None   Lifestyle    Physical activity:     Days per week: Not on file     Minutes per session: Not on file    Stress: Not on file   Relationships    Social connections:     Talks on phone: Not on file     Gets together: Not on file     Attends Caodaism service: Not on file     Active member of club or organization: Not on file     Attends meetings of clubs or organizations: Not on file     Relationship status: Not on file    Intimate partner violence:     Fear of current or ex partner: Not on file     Emotionally abused: Not on file     Physically abused: Not on file     Forced sexual activity: Not on file   Other Topics Concern    Not on file   Social History Narrative    Not on file         ALLERGIES: Patient has no known allergies. Review of Systems   Constitutional: Negative for chills and fever. Musculoskeletal: Positive for arthralgias. Negative for back pain, joint swelling, neck pain and stiffness. Skin: Negative for color change, itching and wound. Neurological: Negative for tingling and numbness. Vitals:    07/08/19 1050   BP: (!) 147/95   Pulse: 80   Resp: 16   Temp: 98.3 °F (36.8 °C)   SpO2: 99%   Weight: 95.3 kg (210 lb)   Height: 5' 6\" (1.676 m)            Physical Exam   Constitutional: He is oriented to person, place, and time. He appears well-developed and well-nourished. No distress. HENT:   Head: Normocephalic and atraumatic. Eyes: Conjunctivae and EOM are normal.   Neck: Normal range of motion. Neck supple. Cardiovascular: Normal rate and regular rhythm. Pulses:       Dorsalis pedis pulses are 2+ on the left side. Posterior tibial pulses are 2+ on the left side. Pulmonary/Chest: Effort normal and breath sounds normal.   Musculoskeletal:        Left knee: Normal. No tenderness found. Left ankle: Normal. He exhibits normal pulse. Left lower leg: He exhibits tenderness. He exhibits no swelling, no edema and no deformity. Neurological: He is alert and oriented to person, place, and time.    Skin: Skin is warm, dry and intact. He is not diaphoretic. No erythema. No pallor. Psychiatric: He has a normal mood and affect. His behavior is normal.   Nursing note and vitals reviewed. MDM  Number of Diagnoses or Management Options  Diagnosis management comments: Doppler ordered. Patient eloped prior to having study completed.         Amount and/or Complexity of Data Reviewed  Tests in the radiology section of CPT®: ordered    Patient Progress  Patient progress: stable         Procedures

## 2019-07-11 NOTE — DISCHARGE INSTRUCTIONS
Abscessed Tooth: Care Instructions  Your Care Instructions    An abscessed tooth is a tooth that has a pocket of pus in the tissues around it. Pus forms when the body tries to fight an infection caused by bacteria. If the pus cannot drain, it forms an abscess. An abscessed tooth can cause red, swollen gums and throbbing pain, especially when you chew. You may have a bad taste in your mouth and a fever, and your jaw may swell. Damage to the tooth, untreated tooth decay, or gum disease can cause an abscessed tooth. An abscessed tooth needs to be treated by a dental professional right away. If it is not treated, the infection could spread to other parts of your body. Your dentist will give you antibiotics to stop the infection. He or she may make a hole in the tooth or cut open (juana) the abscess inside your mouth so that the infection can drain, which should relieve your pain. You may need to have a root canal treatment, which tries to save your tooth by taking out the infected pulp and replacing it with a healing medicine and/or a filling. If these treatments do not work, your tooth may have to be removed. Follow-up care is a key part of your treatment and safety. Be sure to make and go to all appointments, and call your doctor if you are having problems. It's also a good idea to know your test results and keep a list of the medicines you take. How can you care for yourself at home? · Reduce pain and swelling in your face and jaw by putting ice or a cold pack on the outside of your cheek for 10 to 20 minutes at a time. Put a thin cloth between the ice and your skin. · Take pain medicines exactly as directed. ¨ If the doctor gave you a prescription medicine for pain, take it as prescribed. ¨ If you are not taking a prescription pain medicine, ask your doctor if you can take an over-the-counter medicine. · Take your antibiotics as directed. Do not stop taking them just because you feel better.  You need to take the full course of antibiotics. To prevent tooth abscess  · Brush and floss every day, and have regular dental checkups. · Eat a healthy diet, and avoid sugary foods and drinks. · Do not smoke, use e-cigarettes with nicotine, or use spit tobacco. Tobacco and nicotine slow your ability to heal. Tobacco also increases your risk for gum disease and cancer of the mouth and throat. If you need help quitting, talk to your doctor about stop-smoking programs and medicines. These can increase your chances of quitting for good. When should you call for help? Call 911 anytime you think you may need emergency care. For example, call if:  ? · You have trouble breathing. ?Call your doctor now or seek immediate medical care if:  ? · You have new or worse symptoms of infection, such as:  ¨ Increased pain, swelling, warmth, or redness. ¨ Red streaks leading from the area. ¨ Pus draining from the area. ¨ A fever. ? Watch closely for changes in your health, and be sure to contact your doctor if:  ? · You do not get better as expected. Where can you learn more? Go to http://sarah-wayne.info/. Enter Z499 in the search box to learn more about \"Abscessed Tooth: Care Instructions. \"  Current as of: May 12, 2017  Content Version: 11.4  © 3359-5629 [employer]. Care instructions adapted under license by [employer] (which disclaims liability or warranty for this information). If you have questions about a medical condition or this instruction, always ask your healthcare professional. [place] [address] any warranty or liability for your use of this information. None known

## 2019-12-12 ENCOUNTER — APPOINTMENT (OUTPATIENT)
Dept: CT IMAGING | Age: 44
DRG: 045 | End: 2019-12-12
Attending: EMERGENCY MEDICINE
Payer: MEDICAID

## 2019-12-12 ENCOUNTER — HOSPITAL ENCOUNTER (INPATIENT)
Age: 44
LOS: 181 days | Discharge: NURSING, REHAB OR LONG-TERM CARE | DRG: 045 | End: 2020-06-10
Attending: EMERGENCY MEDICINE | Admitting: INTERNAL MEDICINE
Payer: MEDICAID

## 2019-12-12 ENCOUNTER — APPOINTMENT (OUTPATIENT)
Dept: MRI IMAGING | Age: 44
DRG: 045 | End: 2019-12-12
Attending: PSYCHIATRY & NEUROLOGY
Payer: MEDICAID

## 2019-12-12 DIAGNOSIS — I63.9 CEREBROVASCULAR ACCIDENT (CVA), UNSPECIFIED MECHANISM (HCC): Primary | ICD-10-CM

## 2019-12-12 DIAGNOSIS — I63.49 CEREBRAL INFARCTION DUE TO EMBOLISM OF OTHER CEREBRAL ARTERY (HCC): ICD-10-CM

## 2019-12-12 DIAGNOSIS — I63.9 ACUTE ISCHEMIC STROKE (HCC): ICD-10-CM

## 2019-12-12 DIAGNOSIS — I10 ESSENTIAL HYPERTENSION: Chronic | ICD-10-CM

## 2019-12-12 DIAGNOSIS — I63.9 ACUTE EMBOLIC STROKE (HCC): ICD-10-CM

## 2019-12-12 LAB
ALBUMIN SERPL-MCNC: 2.4 G/DL (ref 3.5–5)
ALBUMIN/GLOB SERPL: 0.6 {RATIO} (ref 1.2–3.5)
ALP SERPL-CCNC: 120 U/L (ref 50–136)
ALT SERPL-CCNC: 24 U/L (ref 12–65)
ANION GAP SERPL CALC-SCNC: 6 MMOL/L (ref 7–16)
AST SERPL-CCNC: 28 U/L (ref 15–37)
ATRIAL RATE: 90 BPM
BASOPHILS # BLD: 0.1 K/UL (ref 0–0.2)
BASOPHILS NFR BLD: 1 % (ref 0–2)
BILIRUB SERPL-MCNC: 0.3 MG/DL (ref 0.2–1.1)
BUN SERPL-MCNC: 12 MG/DL (ref 6–23)
CALCIUM SERPL-MCNC: 8.9 MG/DL (ref 8.3–10.4)
CALCULATED P AXIS, ECG09: 61 DEGREES
CALCULATED R AXIS, ECG10: 42 DEGREES
CALCULATED T AXIS, ECG11: 2 DEGREES
CHLORIDE SERPL-SCNC: 108 MMOL/L (ref 98–107)
CO2 SERPL-SCNC: 29 MMOL/L (ref 21–32)
CREAT SERPL-MCNC: 1.46 MG/DL (ref 0.8–1.5)
DIAGNOSIS, 93000: NORMAL
DIFFERENTIAL METHOD BLD: ABNORMAL
EOSINOPHIL # BLD: 0 K/UL (ref 0–0.8)
EOSINOPHIL NFR BLD: 1 % (ref 0.5–7.8)
ERYTHROCYTE [DISTWIDTH] IN BLOOD BY AUTOMATED COUNT: 18.6 % (ref 11.9–14.6)
GLOBULIN SER CALC-MCNC: 4.1 G/DL (ref 2.3–3.5)
GLUCOSE BLD STRIP.AUTO-MCNC: 118 MG/DL (ref 65–100)
GLUCOSE BLD STRIP.AUTO-MCNC: 146 MG/DL (ref 65–100)
GLUCOSE BLD STRIP.AUTO-MCNC: 150 MG/DL (ref 65–100)
GLUCOSE BLD STRIP.AUTO-MCNC: 180 MG/DL (ref 65–100)
GLUCOSE SERPL-MCNC: 144 MG/DL (ref 65–100)
HCT VFR BLD AUTO: 40.5 % (ref 41.1–50.3)
HGB BLD-MCNC: 12.5 G/DL (ref 13.6–17.2)
IMM GRANULOCYTES # BLD AUTO: 0 K/UL (ref 0–0.5)
IMM GRANULOCYTES NFR BLD AUTO: 0 % (ref 0–5)
INR PPP: 0.8
LYMPHOCYTES # BLD: 1.5 K/UL (ref 0.5–4.6)
LYMPHOCYTES NFR BLD: 25 % (ref 13–44)
MCH RBC QN AUTO: 25.4 PG (ref 26.1–32.9)
MCHC RBC AUTO-ENTMCNC: 30.9 G/DL (ref 31.4–35)
MCV RBC AUTO: 82.2 FL (ref 79.6–97.8)
MONOCYTES # BLD: 0.3 K/UL (ref 0.1–1.3)
MONOCYTES NFR BLD: 6 % (ref 4–12)
NEUTS SEG # BLD: 4.1 K/UL (ref 1.7–8.2)
NEUTS SEG NFR BLD: 68 % (ref 43–78)
NRBC # BLD: 0 K/UL (ref 0–0.2)
P-R INTERVAL, ECG05: 144 MS
PLATELET # BLD AUTO: 267 K/UL (ref 150–450)
PMV BLD AUTO: 11.7 FL (ref 9.4–12.3)
POTASSIUM SERPL-SCNC: 3 MMOL/L (ref 3.5–5.1)
PROT SERPL-MCNC: 6.5 G/DL (ref 6.3–8.2)
PROTHROMBIN TIME: 10.9 SEC (ref 11.7–14.5)
Q-T INTERVAL, ECG07: 418 MS
QRS DURATION, ECG06: 86 MS
QTC CALCULATION (BEZET), ECG08: 511 MS
RBC # BLD AUTO: 4.93 M/UL (ref 4.23–5.6)
SODIUM SERPL-SCNC: 143 MMOL/L (ref 136–145)
TROPONIN I BLD-MCNC: 0.03 NG/ML (ref 0.02–0.05)
VENTRICULAR RATE, ECG03: 90 BPM
WBC # BLD AUTO: 6 K/UL (ref 4.3–11.1)

## 2019-12-12 PROCEDURE — 70450 CT HEAD/BRAIN W/O DYE: CPT

## 2019-12-12 PROCEDURE — 74011636320 HC RX REV CODE- 636/320: Performed by: EMERGENCY MEDICINE

## 2019-12-12 PROCEDURE — 99291 CRITICAL CARE FIRST HOUR: CPT | Performed by: PSYCHIATRY & NEUROLOGY

## 2019-12-12 PROCEDURE — 74011000250 HC RX REV CODE- 250: Performed by: PSYCHIATRY & NEUROLOGY

## 2019-12-12 PROCEDURE — 74011250637 HC RX REV CODE- 250/637: Performed by: INTERNAL MEDICINE

## 2019-12-12 PROCEDURE — 92610 EVALUATE SWALLOWING FUNCTION: CPT

## 2019-12-12 PROCEDURE — 82962 GLUCOSE BLOOD TEST: CPT

## 2019-12-12 PROCEDURE — C8929 TTE W OR WO FOL WCON,DOPPLER: HCPCS

## 2019-12-12 PROCEDURE — 74011000258 HC RX REV CODE- 258: Performed by: EMERGENCY MEDICINE

## 2019-12-12 PROCEDURE — 74011250636 HC RX REV CODE- 250/636: Performed by: INTERNAL MEDICINE

## 2019-12-12 PROCEDURE — 74011000302 HC RX REV CODE- 302: Performed by: INTERNAL MEDICINE

## 2019-12-12 PROCEDURE — 74011000250 HC RX REV CODE- 250: Performed by: INTERNAL MEDICINE

## 2019-12-12 PROCEDURE — 80053 COMPREHEN METABOLIC PANEL: CPT

## 2019-12-12 PROCEDURE — 85025 COMPLETE CBC W/AUTO DIFF WBC: CPT

## 2019-12-12 PROCEDURE — 86580 TB INTRADERMAL TEST: CPT | Performed by: INTERNAL MEDICINE

## 2019-12-12 PROCEDURE — 93005 ELECTROCARDIOGRAM TRACING: CPT | Performed by: EMERGENCY MEDICINE

## 2019-12-12 PROCEDURE — 65660000000 HC RM CCU STEPDOWN

## 2019-12-12 PROCEDURE — 70496 CT ANGIOGRAPHY HEAD: CPT

## 2019-12-12 PROCEDURE — 84484 ASSAY OF TROPONIN QUANT: CPT

## 2019-12-12 PROCEDURE — 85610 PROTHROMBIN TIME: CPT

## 2019-12-12 PROCEDURE — 99285 EMERGENCY DEPT VISIT HI MDM: CPT | Performed by: EMERGENCY MEDICINE

## 2019-12-12 PROCEDURE — 96365 THER/PROPH/DIAG IV INF INIT: CPT | Performed by: EMERGENCY MEDICINE

## 2019-12-12 PROCEDURE — 70551 MRI BRAIN STEM W/O DYE: CPT

## 2019-12-12 PROCEDURE — 74011636637 HC RX REV CODE- 636/637: Performed by: INTERNAL MEDICINE

## 2019-12-12 RX ORDER — POLYETHYLENE GLYCOL 3350 17 G/17G
17 POWDER, FOR SOLUTION ORAL
Status: DISCONTINUED | OUTPATIENT
Start: 2019-12-12 | End: 2019-12-29

## 2019-12-12 RX ORDER — LISINOPRIL 20 MG/1
40 TABLET ORAL DAILY
Status: DISCONTINUED | OUTPATIENT
Start: 2019-12-13 | End: 2020-06-10 | Stop reason: HOSPADM

## 2019-12-12 RX ORDER — LABETALOL HYDROCHLORIDE 5 MG/ML
10 INJECTION, SOLUTION INTRAVENOUS
Status: COMPLETED | OUTPATIENT
Start: 2019-12-12 | End: 2019-12-12

## 2019-12-12 RX ORDER — ACETAMINOPHEN 325 MG/1
650 TABLET ORAL
Status: DISCONTINUED | OUTPATIENT
Start: 2019-12-12 | End: 2020-01-31

## 2019-12-12 RX ORDER — ONDANSETRON 2 MG/ML
4 INJECTION INTRAMUSCULAR; INTRAVENOUS
Status: DISCONTINUED | OUTPATIENT
Start: 2019-12-12 | End: 2020-06-10 | Stop reason: HOSPADM

## 2019-12-12 RX ORDER — SODIUM CHLORIDE 0.9 % (FLUSH) 0.9 %
5-40 SYRINGE (ML) INJECTION AS NEEDED
Status: DISCONTINUED | OUTPATIENT
Start: 2019-12-12 | End: 2020-06-10 | Stop reason: HOSPADM

## 2019-12-12 RX ORDER — INSULIN LISPRO 100 [IU]/ML
INJECTION, SOLUTION INTRAVENOUS; SUBCUTANEOUS
Status: DISCONTINUED | OUTPATIENT
Start: 2019-12-12 | End: 2020-01-10

## 2019-12-12 RX ORDER — LABETALOL HYDROCHLORIDE 5 MG/ML
5 INJECTION, SOLUTION INTRAVENOUS
Status: DISCONTINUED | OUTPATIENT
Start: 2019-12-12 | End: 2019-12-13

## 2019-12-12 RX ORDER — GUAIFENESIN 100 MG/5ML
81 LIQUID (ML) ORAL DAILY
Status: DISCONTINUED | OUTPATIENT
Start: 2019-12-12 | End: 2020-06-10 | Stop reason: HOSPADM

## 2019-12-12 RX ORDER — DEXTROSE 40 %
15 GEL (GRAM) ORAL AS NEEDED
Status: DISCONTINUED | OUTPATIENT
Start: 2019-12-12 | End: 2020-06-10 | Stop reason: HOSPADM

## 2019-12-12 RX ORDER — SODIUM CHLORIDE 0.9 % (FLUSH) 0.9 %
10 SYRINGE (ML) INJECTION
Status: COMPLETED | OUTPATIENT
Start: 2019-12-12 | End: 2019-12-12

## 2019-12-12 RX ORDER — POTASSIUM CHLORIDE 20 MEQ/1
40 TABLET, EXTENDED RELEASE ORAL 2 TIMES DAILY
Status: COMPLETED | OUTPATIENT
Start: 2019-12-12 | End: 2019-12-12

## 2019-12-12 RX ORDER — DEXTROSE 50 % IN WATER (D50W) INTRAVENOUS SYRINGE
25-50 AS NEEDED
Status: DISCONTINUED | OUTPATIENT
Start: 2019-12-12 | End: 2020-06-10 | Stop reason: HOSPADM

## 2019-12-12 RX ORDER — SODIUM CHLORIDE 0.9 % (FLUSH) 0.9 %
5-40 SYRINGE (ML) INJECTION EVERY 8 HOURS
Status: DISCONTINUED | OUTPATIENT
Start: 2019-12-12 | End: 2020-01-13

## 2019-12-12 RX ORDER — ATORVASTATIN CALCIUM 40 MG/1
40 TABLET, FILM COATED ORAL
Status: DISCONTINUED | OUTPATIENT
Start: 2019-12-12 | End: 2019-12-13

## 2019-12-12 RX ORDER — ENOXAPARIN SODIUM 100 MG/ML
40 INJECTION SUBCUTANEOUS EVERY 24 HOURS
Status: DISCONTINUED | OUTPATIENT
Start: 2019-12-12 | End: 2020-06-10 | Stop reason: HOSPADM

## 2019-12-12 RX ADMIN — SODIUM CHLORIDE 100 ML: 900 INJECTION, SOLUTION INTRAVENOUS at 09:16

## 2019-12-12 RX ADMIN — LABETALOL HYDROCHLORIDE 10 MG: 5 INJECTION INTRAVENOUS at 09:25

## 2019-12-12 RX ADMIN — NICARDIPINE HYDROCHLORIDE 5 MG/HR: 25 INJECTION INTRAVENOUS at 09:16

## 2019-12-12 RX ADMIN — ATORVASTATIN CALCIUM 40 MG: 40 TABLET, FILM COATED ORAL at 21:29

## 2019-12-12 RX ADMIN — INSULIN LISPRO 2 UNITS: 100 INJECTION, SOLUTION INTRAVENOUS; SUBCUTANEOUS at 21:43

## 2019-12-12 RX ADMIN — ENOXAPARIN SODIUM 40 MG: 40 INJECTION SUBCUTANEOUS at 15:01

## 2019-12-12 RX ADMIN — IOPAMIDOL 50 ML: 755 INJECTION, SOLUTION INTRAVENOUS at 09:16

## 2019-12-12 RX ADMIN — POTASSIUM CHLORIDE 40 MEQ: 20 TABLET, EXTENDED RELEASE ORAL at 18:02

## 2019-12-12 RX ADMIN — ACETAMINOPHEN 650 MG: 325 TABLET, FILM COATED ORAL at 19:50

## 2019-12-12 RX ADMIN — POTASSIUM CHLORIDE 40 MEQ: 20 TABLET, EXTENDED RELEASE ORAL at 15:01

## 2019-12-12 RX ADMIN — Medication 10 ML: at 09:16

## 2019-12-12 RX ADMIN — PERFLUTREN 1 ML: 6.52 INJECTION, SUSPENSION INTRAVENOUS at 14:10

## 2019-12-12 RX ADMIN — INSULIN LISPRO 2 UNITS: 100 INJECTION, SOLUTION INTRAVENOUS; SUBCUTANEOUS at 17:21

## 2019-12-12 RX ADMIN — TUBERCULIN PURIFIED PROTEIN DERIVATIVE 5 UNITS: 5 INJECTION, SOLUTION INTRADERMAL at 18:02

## 2019-12-12 RX ADMIN — Medication 10 ML: at 18:02

## 2019-12-12 RX ADMIN — ASPIRIN 81 MG 81 MG: 81 TABLET ORAL at 15:01

## 2019-12-12 RX ADMIN — Medication 10 ML: at 21:29

## 2019-12-12 NOTE — ED NOTES
TRANSFER - OUT REPORT:    Verbal report given to RN (name) on Ray Nevarez  being transferred to [facility] (unit) for routine progression of care       Report consisted of patients Situation, Background, Assessment and   Recommendations(SBAR). Information from the following report(s) SBAR was reveiwed with the receiving nurse. Opportunity for questions and clarification was provided. Ischemic Stroke without Activase/TIA    BP Parameters: Less Than 220/120 for 24 hours, then consult MD for parameters    Controlled With: PRN - IV    Dysphagia Screen Completed:  Fail    NIH Stroke Scale Complete: Yes: 10    Frequency of Vital Signs: Every 2 hours    Frequency of Neuro Checks: Every 2 hours

## 2019-12-12 NOTE — PROGRESS NOTES
Problem: Dysphagia (Adult)  Goal: *Acute Goals and Plan of Care (Insert Text)  Description  LTG: Patient will tolerate least restrictive diet without overt signs or symptoms of airway compromise. STG: Patient will tolerate puree diet and thin liquids without overt signs or symptoms of airway compromise. STG: Patient will participate in chewable trials to determine ability to tolerate diet advancement  STG: Patient will participate in modified barium swallow study as clinically indicated. Outcome: Progressing Towards Goal    STG: Patient will participate in speech/language/cognitive evaluation      SPEECH LANGUAGE PATHOLOGY: DYSPHAGIA- Initial Assessment    NAME/AGE/GENDER: Austin Lo is a 53 y.o. male  DATE: 12/12/2019  PRIMARY DIAGNOSIS: Acute ischemic stroke [facility]) [I63.9]  Cerebrovascular accident (CVA) due to embolism ([address] [I63.9]       ICD-10: Treatment Diagnosis: R13.12 Dysphagia, Oropharyngeal Phase    INTERDISCIPLINARY COLLABORATION: Registered Nurse  PRECAUTIONS/ALLERGIES: Patient has no known allergies. SUBJECTIVE   Wife present. Positioned upright in bed. Significant left sided weakness. Speech dysarthric. History of Present Injury/Illness: Mr. Krys Grimaldo  has a past medical history of Acute ischemic stroke ([address] (12/12/2019), Acute pancreatitis (11/19/2014), Cerebrovascular accident (CVA) due to embolism ([address] (12/12/2019), Diabetes ([address] (2002), Diabetes ([address] Diabetes mellitus, and Hypertension. He also has no past medical history of Arthritis, Asthma, Autoimmune disease ([address] CAD (coronary artery disease), Cancer ([address] Chronic kidney disease, COPD, Dementia, Dementia ([address] Heart failure ([address] Ill-defined condition, Infectious disease, Liver disease, Other ill-defined conditions(799.89), Psychiatric disorder, PUD (peptic ulcer disease), Seizures ([address] or Sleep disorder. Cora Mohan He also  has a past surgical history that includes hx hernia repair and hx orthopaedic. Problem List:  (Impairments causing functional limitations):  Oral dysphagia  Possible pharyngeal dysphagia   Previous Dysphagia: NONE REPORTED    Diet Prior to Evaluation: NPO     Orientation:   Person  Place  Time  Situation    Pain: Pain Scale 1: Numeric (0 - 10)  Pain Intensity 1: 0       Cognitive-Linguistic Screen:  Speech Production:   Needs further assessment  ~Moderate dysarthria   Expressive Language: WNL  No word finding difficulties or errors noted at conversation level   Receptive Language: WNL  Followed commands and conversation throughout session without difficulty   Cognition:   Needs further assessment given +MRI results        OBJECTIVE   Oral Motor:   Labial: Left droop  Dentition: Limited and Poor  Oral Hygiene: Adequate  Lingual: Decreased rate     Swallow assessment:   Patient presented with ice chips, thin liquids by tsp, thin liquid via cup and straw, puree, mixed, and solid consistencies. Prolonged mastication and min diffuse oral residue with chewables. Timely swallow initiation, and single swallows upon palpation. Cough x1 with thin liquid wash following coarse solids. No other overt signs or symptoms of airway compromise observed with liquid or solid textures. Vocal quality clear. ASSESSMENT   Patient presents with oral dysphagia due to left sided weakness and limited dentition and single episode of possible pharyngeal dysphagia. Patient and wife educated on safe swallowing guidelines and aspiration precautions. Recommend puree diet and thin liquids. Float medications in puree. Will follow up for diet tolerance, po trials, and speech/cognitive linguistic evaluation.           Tool Used: Dysphagia Outcome and Severity Scale (ROCHELLE)    Score Comments   Normal Diet  [] 7 With no strategies or extra time needed   Functional Swallow  [] 6 May have mild oral or pharyngeal delay   Mild Dysphagia  [] 5 Which may require one diet consistency restricted    Mild-Moderate Dysphagia [x] 4 With 1-2 diet consistencies restricted   Moderate Dysphagia  [] 3 With 2 or more diet consistencies restricted   Moderate-Severe Dysphagia  [] 2 With partial PO strategies (trials with ST only)   Severe Dysphagia  [] 1 With inability to tolerate any PO safely      Score:  Initial: 4 Most Recent:  (Date 12/12/19 )   Interpretation of Tool: The Dysphagia Outcome and Severity Scale (ROCHELLE) is a simple, easy-to-use, 7-point scale developed to systematically rate the functional severity of dysphagia based on objective assessment and make recommendations for diet level, independence level, and type of nutrition. Current Medications:   No current facility-administered medications on file prior to encounter. Current Outpatient Medications on File Prior to Encounter   Medication Sig Dispense Refill    lisinopril (PRINIVIL, ZESTRIL) 40 mg tablet Take 1 Tab by mouth daily. 30 Tab 3    omeprazole (PRILOSEC) 20 mg capsule Take 1 Cap by mouth daily. 30 Cap 1    insulin NPH/insulin regular (NOVOLIN 70/30, HUMULIN 70/30) 100 unit/mL (70-30) injection by SubCUTAneous route. Insulin Syringe-Needle U-100 1/2 mL 31 x 5/16\" syrg            PLAN    FREQUENCY/DURATION: Continue to follow patient 3 times a week for duration of hospital stay to address above goals. - Recommendations for next treatment session: Next treatment will address dysphagia and speech/cognitive linguistic evaluation       REHABILITATION POTENTIAL FOR STATED GOALS: Excellent     COMPLIANCE WITH PROGRAM/EXERCISES: Will assess as treatment progresses    CONTINUATION OF SKILLED SERVICES/MEDICAL NECESSITY:  Patient is expected to demonstrate progress in  swallow strength, swallow timeliness, swallow function, diet tolerance, and swallow safety in order to  improve swallow safety, work toward diet advancement, and decrease aspiration risk. Patient continues to require skilled intervention due to dysphagia/modified diet.           RECOMMENDATIONS DIET:   PO:  Pureed  Liquids:  regular thin    MEDICATIONS: Whole in puree     ASPIRATION PRECAUTIONS  Slow rate of intake  Small bites/sips  Upright at 90 degrees during meal     COMPENSATORY STRATEGIES/MODIFICATIONS  Small sips and bites  Alternate solids/liquids      EDUCATION:  Recommendations discussed with RN, patient, and his wife      RECOMMENDATIONS for CONTINUED SPEECH THERAPY:   YES: Anticipate need for ongoing speech therapy during this hospitalization and at next level of care.        SAFETY:  After treatment position/precautions:  Upright in bed  RN notified  wife at bedside    Total Treatment Duration:   Time In: 9995  Time Out: Rene Antonio, [facility] MED[facility] DEL [facility], [facility], [address] [place] [address]

## 2019-12-12 NOTE — PROGRESS NOTES
TRANSFER - IN REPORT:    Verbal report received from [facility] on Scarlet Tellez  being received from ED for routine progression of care      Report consisted of patients Situation, Background, Assessment and   Recommendations(SBAR). Information from the following report(s) SBAR was reviewed with the receiving nurse. Opportunity for questions and clarification was provided. Assessment completed upon patients arrival to unit and care assumed.

## 2019-12-12 NOTE — PROGRESS NOTES
12/12/19 1638   NIH Stroke Scale   Interval Other (comment)   LOC 0   LOC Questions 0   LOC Commands 0   Best Gaze 0   Visual 0   Facial Palsy 2   Motor Right Arm 0   Motor Left Arm 3   Motor Right Leg 0   Motor Left Leg 4   Limb Ataxia 0   Sensory 0   Best Language 0   Dysarthria 1   Extinction and Inattention 0   Total 10   NIH JERRY Hood

## 2019-12-12 NOTE — PROGRESS NOTES
12/12/19 1638   Dual Skin Pressure Injury Assessment   Dual Skin Pressure Injury Assessment WDL   Second Care Provider (Based on [address] [place] [address]) Fallon Varghese RN   Skin Integumentary   Skin Integumentary (WDL) X   Skin Color Appropriate for ethnicity   Skin Condition/Temp Warm;Dry   Skin Integrity Abrasion  (RLE)   Turgor Non-tenting   Hair Growth Present   Varicosities Absent

## 2019-12-12 NOTE — ED TRIAGE NOTES
Pt arrives via EMS from home. Started having stroke symptoms at midnight. States left side facial droop and leg weakness. Dr. Charles Alegria assessing patient. Pt given nitro paste due to BP of 300/160. Hx of HTN. Pt states he cannot walk.  Some slurred speech noted

## 2019-12-12 NOTE — H&P
Hospitalist Note     Admit Date:  2019  9:07 AM   Name:  Henry Smiley   Age:  53 y.o.  :  1975   MRN:  [id number]   PCP:  Marian Olson MD  Treatment Team: Attending Provider: Rocio Salazar MD; Primary Nurse: Nelson Dale RN; Speech Language Pathologist: Pernell Calderon    HPI/Subjective:   Pt is a 54 y/o smoker with DM, HTN, who presented to ER with L leg and arm weakness, L facial droop, dysarthria. First noted L leg weakness late night  when he woke up to go to the bathroom. He was normal when he went to bed around 1030. Woke up this morning and had persistent weakness L leg and also now noted in L arm. EMS called. Noted to have slurred speech and L facial droop as well. Code S called in ER around 9am.  MRI with acute infarcts in L cerebellar hemisphere, deep frontoparietal white matter, and R paramedian yariel. Also noted old lacunar infarcts. No large vessel occlusion on CTA, but some stenosis noted. No hx afib, TIA, CVA. No CP, palpitations, SOB. 10 systems reviewed and negative except as noted in HPI.   Past Medical History:   Diagnosis Date    Acute ischemic stroke ([address] 2019    Acute pancreatitis 2014    Cerebrovascular accident (CVA) due to embolism ([address] 2019    [facility] [address] 2002    Diabetes ([facility] [address]     Diabetes mellitus     Hypertension       Past Surgical History:   Procedure Laterality Date    HX HERNIA REPAIR      HX ORTHOPAEDIC      carpal tunnel surgery      No Known Allergies   Social History     Tobacco Use    Smoking status: Current Every Day Smoker     Packs/day: 0.25     Types: Cigarettes    Smokeless tobacco: Former User     Types: Chew   Substance Use Topics    Alcohol use: No      Family History   Problem Relation Age of Onset    Diabetes Mother     Diabetes Father       Immunization History   Administered Date(s) Administered    Influenza Vaccine 10/16/2013, 2014    TD Vaccine 1988    Td 10/05/2012     PTA Medications:  Prior to Admission Medications   Prescriptions Last Dose Informant Patient Reported? Taking? Insulin Syringe-Needle U-100 1/2 mL 31 x 5/16\" syrg   Yes No   insulin NPH/insulin regular (NOVOLIN 70/30, HUMULIN 70/30) 100 unit/mL (70-30) injection   Yes No   Sig: by SubCUTAneous route. lisinopril (PRINIVIL, ZESTRIL) 40 mg tablet   No No   Sig: Take 1 Tab by mouth daily. omeprazole (PRILOSEC) 20 mg capsule   No No   Sig: Take 1 Cap by mouth daily. Facility-Administered Medications: None       Objective:     Patient Vitals for the past 24 hrs:   Temp Pulse Resp BP SpO2   12/12/19 1244  95 21 126/60    12/12/19 1216  96 (!) 56 147/77    12/12/19 1140 98.4 °F (36.9 °C)       12/12/19 1026  80 16  99 %   12/12/19 1023  81 16 133/76 96 %   12/12/19 1022  95  133/76 96 %   12/12/19 1020  71 16 137/76 95 %   12/12/19 0945  83 16 153/78 98 %   12/12/19 0940  82  151/82    12/12/19 0932  85 16 145/76 98 %   12/12/19 0925  91 16 174/80 97 %   12/12/19 0922  91 16 (!) 170/91 99 %   12/12/19 0917  77 16 (!) 178/99 98 %   12/12/19 0915  90 16 (!) 196/103 99 %     Oxygen Therapy  O2 Sat (%): 99 % (12/12/19 1026)  Pulse via Oximetry: 95 beats per minute (12/12/19 1022)  No intake or output data in the 24 hours ending 12/12/19 1357    Physical Exam:  General:    Well nourished. Alert and oriented. Eyes:   Normal sclera. Extraocular movements intact. ENT:  Normocephalic, atraumatic. Moist mucous membranes  CV:   RRR. No murmur, rub, or gallop. Lungs:  Clear to auscultation bilaterally. No wheezing, rhonchi, or rales. Abdomen: Soft, nontender, nondistended. Extremities: Warm and dry. No cyanosis or edema. Neurologic: CN II-XII intact except for L facial droop. Sensation intact. Finger to nose intact. PERRLA.  +slurred speech. No confusion. Mild tremors both hands. STR 2/5 LUE and LLE. Skin:     No rashes or jaundice. No wounds.     Psych:  Normal mood and affect. I reviewed the labs, imaging, EKGs, telemetry, and other studies done this admission. Data Review:   Recent Results (from the past 24 hour(s))   GLUCOSE, POC    Collection Time: 12/12/19  9:04 AM   Result Value Ref Range    Glucose (POC) 146 (H) 65 - 390 mg/dL   METABOLIC PANEL, COMPREHENSIVE    Collection Time: 12/12/19  9:06 AM   Result Value Ref Range    Sodium 143 136 - 145 mmol/L    Potassium 3.0 (L) 3.5 - 5.1 mmol/L    Chloride 108 (H) 98 - 107 mmol/L    CO2 29 21 - 32 mmol/L    Anion gap 6 (L) 7 - 16 mmol/L    Glucose 144 (H) 65 - 100 mg/dL    BUN 12 6 - 23 MG/DL    Creatinine 1.46 0.8 - 1.5 MG/DL    GFR est AA >60 >60 ml/min/1.73m2    GFR est non-AA 55 (L) >60 ml/min/1.73m2    Calcium 8.9 8.3 - 10.4 MG/DL    Bilirubin, total 0.3 0.2 - 1.1 MG/DL    ALT (SGPT) 24 12 - 65 U/L    AST (SGOT) 28 15 - 37 U/L    Alk. phosphatase 120 50 - 136 U/L    Protein, total 6.5 6.3 - 8.2 g/dL    Albumin 2.4 (L) 3.5 - 5.0 g/dL    Globulin 4.1 (H) 2.3 - 3.5 g/dL    A-G Ratio 0.6 (L) 1.2 - 3.5     PROTHROMBIN TIME + INR    Collection Time: 12/12/19  9:06 AM   Result Value Ref Range    Prothrombin time 10.9 (L) 11.7 - 14.5 sec    INR 0.8     CBC WITH AUTOMATED DIFF    Collection Time: 12/12/19  9:08 AM   Result Value Ref Range    WBC 6.0 4.3 - 11.1 K/uL    RBC 4.93 4.23 - 5.6 M/uL    HGB 12.5 (L) 13.6 - 17.2 g/dL    HCT 40.5 (L) 41.1 - 50.3 %    MCV 82.2 79.6 - 97.8 FL    MCH 25.4 (L) 26.1 - 32.9 PG    MCHC 30.9 (L) 31.4 - 35.0 g/dL    RDW 18.6 (H) 11.9 - 14.6 %    PLATELET 223 846 - 882 K/uL    MPV 11.7 9.4 - 12.3 FL    ABSOLUTE NRBC 0.00 0.0 - 0.2 K/uL    DF AUTOMATED      NEUTROPHILS 68 43 - 78 %    LYMPHOCYTES 25 13 - 44 %    MONOCYTES 6 4.0 - 12.0 %    EOSINOPHILS 1 0.5 - 7.8 %    BASOPHILS 1 0.0 - 2.0 %    IMMATURE GRANULOCYTES 0 0.0 - 5.0 %    ABS. NEUTROPHILS 4.1 1.7 - 8.2 K/UL    ABS. LYMPHOCYTES 1.5 0.5 - 4.6 K/UL    ABS. MONOCYTES 0.3 0.1 - 1.3 K/UL    ABS. EOSINOPHILS 0.0 0.0 - 0.8 K/UL    ABS. BASOPHILS 0.1 0.0 - 0.2 K/UL    ABS. IMM. GRANS. 0.0 0.0 - 0.5 K/UL   POC TROPONIN-I    Collection Time: 12/12/19  9:10 AM   Result Value Ref Range    Troponin-I (POC) 0.03 0.02 - 0.05 ng/ml   EKG, 12 LEAD, INITIAL    Collection Time: 12/12/19 10:27 AM   Result Value Ref Range    Ventricular Rate 90 BPM    Atrial Rate 90 BPM    P-R Interval 144 ms    QRS Duration 86 ms    Q-T Interval 418 ms    QTC Calculation (Bezet) 511 ms    Calculated P Axis 61 degrees    Calculated R Axis 42 degrees    Calculated T Axis 2 degrees    Diagnosis       !! AGE AND GENDER SPECIFIC ECG ANALYSIS !!   Normal sinus rhythm  Nonspecific T wave abnormality  Prolonged QT  Abnormal ECG  When compared with ECG of 31-OCT-2016 13:45,  Nonspecific T wave abnormality, worse in Inferior leads  Nonspecific T wave abnormality, worse in Lateral leads  QT has lengthened  Confirmed by Sandy Simmons MD (), QUIN CLEMENTS ([id number]) on 12/12/2019 1:14:23 PM         All Micro Results     None          Current Facility-Administered Medications   Medication Dose Route Frequency    niCARdipine in Saline (CARDENE) 25 MG/250 mL infusion kit  5 mg/hr IntraVENous TITRATE    [START ON 12/13/2019] lisinopril (PRINIVIL, ZESTRIL) tablet 40 mg  40 mg Oral DAILY    tuberculin injection 5 Units  5 Units IntraDERMal ONCE    sodium chloride (NS) flush 5-40 mL  5-40 mL IntraVENous Q8H    sodium chloride (NS) flush 5-40 mL  5-40 mL IntraVENous PRN    ondansetron (ZOFRAN) injection 4 mg  4 mg IntraVENous Q4H PRN    aspirin chewable tablet 81 mg  81 mg Oral DAILY    atorvastatin (LIPITOR) tablet 40 mg  40 mg Oral QHS    acetaminophen (TYLENOL) tablet 650 mg  650 mg Oral Q4H PRN    labetalol (NORMODYNE;TRANDATE) injection 5 mg  5 mg IntraVENous Q10MIN PRN    polyethylene glycol (MIRALAX) packet 17 g  17 g Oral DAILY PRN    enoxaparin (LOVENOX) injection 40 mg  40 mg SubCUTAneous Q24H    insulin lispro (HUMALOG) injection   SubCUTAneous AC&HS    dextrose 40% (GLUTOSE) oral gel 1 Tube  15 g Oral PRN    glucagon (GLUCAGEN) injection 1 mg  1 mg IntraMUSCular PRN    dextrose (D50W) injection syrg 12.5-25 g  25-50 mL IntraVENous PRN    potassium chloride (K-DUR, KLOR-CON) SR tablet 40 mEq  40 mEq Oral BID     Current Outpatient Medications   Medication Sig    lisinopril (PRINIVIL, ZESTRIL) 40 mg tablet Take 1 Tab by mouth daily.  omeprazole (PRILOSEC) 20 mg capsule Take 1 Cap by mouth daily.  insulin NPH/insulin regular (NOVOLIN 70/30, HUMULIN 70/30) 100 unit/mL (70-30) injection by SubCUTAneous route.  Insulin Syringe-Needle U-100 1/2 mL 31 x 5/16\" syrg        Other Studies:  Mri Brain Wo Cont    Result Date: 12/12/2019  MRI BRAIN WITHOUT CONTRAST 12/12/2019 HISTORY: 57-year-old with hypertension and acute neurological deficit on waking. TECHNIQUE: Sagittal and axial T1-weighted, axial T2-weighted, axial and coronal FLAIR, axial T2-weighted gradient-echo, axial diffusion weighted images with ADC maps of the brain. COMPARISON: Head CT December 12, 2019 FINDINGS: On the diffusion weighted sequence there is a focus of restricted diffusion in the deep left periventricular white matter and corpus callosum posteriorly with associated hyperintense FLAIR signal. There is a focus first of restricted diffusion in the left cerebellar hemisphere posteriorly. There is variable restricted diffusion within the right paramedian yariel. There is variable hyperintense FLAIR signal within the yariel that may be associated with older ischemic events. Old infarcts are present in the thalami, left basal ganglia and left corona radiata. There is no acute intracranial hemorrhage. Specifically there is no thalamic hemorrhage. On the T2-weighted and FLAIR sequences, there are white matter hyperintensities compatible with chronic small vessel ischemic disease.  A subcutaneous nodule in the left frontoparietal scalp is likely a sebaceous cyst. There is no hydrocephalus, intra-axial mass or extra-axial hematoma. IMPRESSION: 1. Acute to early subacute infarcts in the left cerebellar hemisphere, in the periventricular deep left frontoparietal white matter and likely additional areas of restricted diffusion in the right paramedian yariel. 2. Old lacunar infarcts in the corpus striatum and periventricular white matter as well as within the left martinez radiata. Cta Code Neuro Head And Neck W Cont    Result Date: 12/12/2019  History: Code stroke. FINDINGS: CT angiography was performed of the neck and head with contrast and three-dimensional CT angiography reconstruction and reformat was performed. NASCET criteria as needed. CT dose reduction was achieved through use of a standardized protocol tailored for this examination and automatic exposure control for dose modulation. Mild atherosclerosis at the carotid bulbs bilaterally. There is stenosis at the distal intracranial segment of the right vertebral artery. The basilar artery is patent. The anterior cerebral arteries are patent bilaterally. There is multifocal stenosis in the P2 segment of the left posterior cerebral artery. The dural venous sinuses are patent. IMPRESSION: Stenosis at the distal segment of the right vertebral artery and multifocal stenosis in the P2 segment of the left posterior cerebral artery. Ct Code Neuro Head Wo Contrast    Result Date: 12/12/2019  CT head without contrast History: code s. Hypertension, left-sided facial droop and leg weakness Technique: 5mm axial images were obtained from the skull base to the vertex without intravenous contrast.  Radiation dose reduction techniques were used for this study:  Our CT scanners use one or all of the following: Automated exposure control, adjustment of the mA and/or kVp according to patient's size, iterative reconstruction. Comparison: None Findings: The ventricles and sulci are normal in size and configuration. There are no extra-axial fluid collections.  There is no evidence to suggest an acute major territorial infarct. Patchy areas of decreased attenuation are present within the supratentorial white matter most compatible with mild chronic small vessel ischemic changes. Remote lacunar infarctions are present within the left internal capsule and left parietal lobe subcortical white matter. Age indeterminate infarctions are present within the left corona radiata/caudate body. There is a small remote right thalamic lacunar infarction. There is no convincing evidence of acute intraparenchymal hemorrhage or mass effect. Curvilinear region of increased density in the region of the left thalamus is felt to represent partial volume averaging from calcified choroid plexus rather than hemorrhage. The bony calvarium is intact. The visualized mastoid air cells and paranasal sinuses are well pneumatized and aerated. Partially calcified left scalp mass near the vertex of the most compatible with a sebaceous cyst.     Impression: 1. Age indeterminate infarctions within the left corona radiata/caudate. 2. Mild chronic small vessel ischemic changes and areas of remote infarction as described. 3. Probable partial volume averaging the region of the left thalamus rather than intraparenchymal hemorrhage. Results discussed with Dr. Westley Avila at the time of dictation at 9:20 AM on 12/12/2019.          Assessment and Plan:     Hospital Problems as of 12/12/2019 Date Reviewed: 3/3/2017          Codes Class Noted - Resolved POA    Acute ischemic stroke [facility]) ICD-10-CM: I63.9  ICD-9-CM: 434.91  12/12/2019 - Present Yes        * (Principal) Cerebrovascular accident (CVA) due to embolism [facility]) ICD-10-CM: I63.9  ICD-9-CM: 434.11  12/12/2019 - Present Yes        Hypertension (Chronic) ICD-10-CM: I10  ICD-9-CM: 401.9  Unknown - Present Yes        Diabetes ([facility] [address] (Chronic) ICD-10-CM: E11.9  ICD-9-CM: 250.00  Unknown - Present Yes              Plan:  CVA  -Admit to inpatient  -Check MRI brain, 2d echo with bubble study, vascular imaging, lipids, and a1c  -Neuro checks q4h  -Antiplatelet and statin ordered  -PT/OT/SLP evals   -Cardiac telemetry  -Permissive HTN for 24 hours after symptoms first noted; no BP meds unless SBP >220 or DBP >120, unless bleeding or evidence of organ damage from HTN. Labetalol IV PRN, or cardene gtt if necessary  -smoking cessation education    Other listed chronic conditions stable, cont home meds    DVT ppx: SCDs  Discharge planning:  CM consulted.   PPD in case of placement but appears he is self pay so placement likely not an option  Code status:  Full  Estimated LOS:  Greater than 2 midnights  Risk:  high     Signed:  Billy Brian MD

## 2019-12-12 NOTE — ED NOTES
Pt states that he is unable to use urinal while sitting in the bed. Notified that due to two prior falls today at home and inability to move LLE that he is unable to stand to use the urinal. Family at bedside states understanding, insisting that patient sit on the side of the bed to attempt to urinate. Family states will stay with patient while he sits on the side of the bed. Contract by patient and family to not attempt standing at this time.

## 2019-12-12 NOTE — CONSULTS
Consult    Patient: Samantha Talavera MRN: [id number]     YOB: 1975  Age: 53 y.o. Sex: male      Subjective:      Samantha Talavera is a 53 y.o. male who is being seen for code S. .  Patient has a pertinent history of hypertension and diabetes. The patient was last known normal at 10:30 PM the previous night. He awoke around midnight to go to the bathroom and had difficulty walking due to weakness of his left leg. When he awoke this morning he had persistent weakness of the left arm and leg. .     The patient presented to [facility] ED. A code S was called at 904. Neurology arrived to the bedside at 903. Initial NIHSS was 8. A CT of the head was obtained and was initially thought to show some possible evidence of hemorrhage in the left thalamus. Subsequent investigation determined that this was not hemorrhage but was possibly partial volume averaging or calcification.      Past Medical History:   Diagnosis Date    Acute ischemic stroke ([facility] 12/12/2019    Acute pancreatitis 11/19/2014    Cerebrovascular accident (CVA) due to embolism ([facility] 12/12/2019    Diabetes ([facility] 2002    Diabetes ([facility]     Diabetes mellitus     Hypertension      Past Surgical History:   Procedure Laterality Date    HX HERNIA REPAIR      HX ORTHOPAEDIC      carpal tunnel surgery      Family History   Problem Relation Age of Onset    Diabetes Mother     Diabetes Father      Social History     Tobacco Use    Smoking status: Current Every Day Smoker     Packs/day: 0.25     Types: Cigarettes    Smokeless tobacco: Former User     Types: Chew   Substance Use Topics    Alcohol use: No      Current Facility-Administered Medications   Medication Dose Route Frequency Provider Last Rate Last Dose    niCARdipine in Saline (CARDENE) 25 MG/250 mL infusion kit  5 mg/hr IntraVENous TITRATE Eryn Polk  mL/hr at 12/12/19 0940 10 mg/hr at 12/12/19 0940    [START ON 12/13/2019] lisinopril (PRINIVIL, ZESTRIL) tablet 40 mg  40 mg Oral DAILY Paris Huggins MD        tuberculin injection 5 Units  5 Units IntraDERMal Anusha Littlejohn MD        sodium chloride (NS) flush 5-40 mL  5-40 mL IntraVENous Q8H Paris Huggins MD        sodium chloride (NS) flush 5-40 mL  5-40 mL IntraVENous PRN Paris Huggins MD        ondansetron [facility]) injection 4 mg  4 mg IntraVENous Q4H PRN Paris Huggins MD        aspirin chewable tablet 81 mg  81 mg Oral DAILY Paris Huggins MD        atorvastatin (LIPITOR) tablet 40 mg  40 mg Oral QHS Paris Huggins MD        acetaminophen (TYLENOL) tablet 650 mg  650 mg Oral Q4H PRN Paris Huggins MD        labetalol (NORMODYNE;TRANDATE) injection 5 mg  5 mg IntraVENous Q10MIN PRN Paris Huggins MD        polyethylene glycol [facility]) packet 17 g  17 g Oral DAILY PRN Paris Huggins MD        enoxaparin (LOVENOX) injection 40 mg  40 mg SubCUTAneous Q24H Paris Huggins MD        insulin lispro (HUMALOG) injection   SubCUTAneous AC&HS Paris Huggins MD        dextrose 40% (GLUTOSE) oral gel 1 Tube  15 g Oral PRN Paris Huggins MD        glucagon [facility]) injection 1 mg  1 mg IntraMUSCular PRN Paris Huggins MD        dextrose (D50W) injection syrg 12.5-25 g  25-50 mL IntraVENous PRN Paris Huggins MD        potassium chloride (K-DUR, KLOR-CON) SR tablet 40 mEq  40 mEq Oral BID Paris Huggins MD         Current Outpatient Medications   Medication Sig Dispense Refill    lisinopril (PRINIVIL, ZESTRIL) 40 mg tablet Take 1 Tab by mouth daily. 30 Tab 3    omeprazole (PRILOSEC) 20 mg capsule Take 1 Cap by mouth daily. 30 Cap 1    insulin NPH/insulin regular (NOVOLIN 70/30, HUMULIN 70/30) 100 unit/mL (70-30) injection by SubCUTAneous route.       Insulin Syringe-Needle U-100 1/2 mL 31 x 5/16\" syrg           No Known Allergies    Review of Systems:  Not obtained due to emergent situation Objective:     Vitals:    12/12/19 1020 12/12/19 1022 12/12/19 1023 12/12/19 1026   BP: 137/76 133/76 133/76    Pulse: 71 95 81 80   Resp: 16  16 16   SpO2: 95% 96% 96% 99%   Weight:       Height:            Physical Exam:      NIHSS   NIHSS Score: 8  1a-Level of Consciousness 0  1b-What is Month/Age 0  1c-Open/Close Eyes&Hand 0  2 -Best Gaze 0  3 -Visual Fields 0  4 -Facial Palsy   5a-Motor-Left Arm 2  5b-Motor-Right Arm 0  6a-Motor-Left Leg 2  6b-Motor-Right Leg 0  7 -Limb Ataxia 0  8 -Sensory 0  9 -Best Language 0  10-Dysarthria 2  11-Extinction/Inattention 0    Lab Results   Component Value Date/Time    Cholesterol, total 215 (H) 06/18/2008 08:10 PM    HDL Cholesterol 39 (L) 06/18/2008 08:10 PM    LDL, calculated 146.2 (H) 06/18/2008 08:10 PM    VLDL, calculated 29.8 (H) 06/18/2008 08:10 PM    Triglyceride 185 (H) 11/20/2014 05:45 AM    CHOL/HDL Ratio  06/18/2008 08:10 PM     5.5  [NORMAL MALE]  1/2 AVERAGE  3.43      AVERAGE  4.97  2X  AVERAGE  9.55  3X  AVERAGE 23.99  [NORMAL FEMALE]  1/2 AVERAGE  3.27      AVERAGE  4.44  2X  AVERAGE  7.05  3X  AVERAGE 11.04        No results found for: HBA1C, HGBE8, IAT5PNFA, HVD4BYLC     Images personally reviewed by me at at the time that they were performed. Discussed at that time with Dr Parris Sebastian. CT Results (most recent):  Results from Hospital Encounter encounter on 12/12/19   CTA CODE NEURO HEAD AND NECK W CONT    Narrative History: Code stroke. FINDINGS:    CT angiography was performed of the neck and head with contrast and  three-dimensional CT angiography reconstruction and reformat was performed. NASCET criteria as needed. CT dose reduction was achieved through use of a  standardized protocol tailored for this examination and automatic exposure  control for dose modulation. Mild atherosclerosis at the carotid bulbs bilaterally. There is stenosis at the  distal intracranial segment of the right vertebral artery. The basilar artery is  patent.  The anterior cerebral arteries are patent bilaterally. There is  multifocal stenosis in the P2 segment of the left posterior cerebral artery. The  dural venous sinuses are patent. Impression IMPRESSION:    Stenosis at the distal segment of the right vertebral artery and multifocal  stenosis in the P2 segment of the left posterior cerebral artery. Results for orders placed or performed during the hospital encounter of 10/31/16   EKG, 12 LEAD, INITIAL   Result Value Ref Range    Systolic BP  mmHg    Diastolic BP  mmHg    Ventricular Rate 78 BPM    Atrial Rate 78 BPM    P-R Interval 150 ms    QRS Duration 86 ms    Q-T Interval 352 ms    QTC Calculation (Bezet) 401 ms    Calculated P Axis 58 degrees    Calculated R Axis 23 degrees    Calculated T Axis 12 degrees    Diagnosis       !! AGE AND GENDER SPECIFIC ECG ANALYSIS !! Sinus rhythm with sinus arrhythmia  Nonspecific T wave abnormality  Abnormal ECG  Confirmed by Aren Hnery MD (), SHELIA VARMA ([id number]) on 10/31/2016 2:19:51 PM          MRI Results (most recent):   Results from [place] encounter on 12/12/19   MRI BRAIN WO CONT    Narrative MRI BRAIN WITHOUT CONTRAST 12/12/2019    HISTORY: 76-year-old with hypertension and acute neurological deficit on waking. TECHNIQUE: Sagittal and axial T1-weighted, axial T2-weighted, axial and coronal  FLAIR, axial T2-weighted gradient-echo, axial diffusion weighted images with ADC  maps of the brain. COMPARISON: Head CT December 12, 2019    FINDINGS: On the diffusion weighted sequence there is a focus of restricted  diffusion in the deep left periventricular white matter and corpus callosum  posteriorly with associated hyperintense FLAIR signal. There is a focus first of  restricted diffusion in the left cerebellar hemisphere posteriorly. There is  variable restricted diffusion within the right paramedian yariel.  There is  variable hyperintense FLAIR signal within the yariel that may be associated with  older ischemic events. Old infarcts are present in the thalami, left basal ganglia and left corona  radiata. There is no acute intracranial hemorrhage. Specifically there is no thalamic  hemorrhage. On the T2-weighted and FLAIR sequences, there are white matter  hyperintensities compatible with chronic small vessel ischemic disease. A  subcutaneous nodule in the left frontoparietal scalp is likely a sebaceous cyst.    There is no hydrocephalus, intra-axial mass or extra-axial hematoma. Impression IMPRESSION:    1. Acute to early subacute infarcts in the left cerebellar hemisphere, in the  periventricular deep left frontoparietal white matter and likely additional  areas of restricted diffusion in the right paramedian yariel. 2. Old lacunar infarcts in the corpus striatum and periventricular white matter  as well as within the left corona radiata. Most recent CTA:  Results from [place] encounter on 12/12/19   CTA CODE NEURO HEAD AND NECK W CONT    Narrative History: Code stroke. FINDINGS:    CT angiography was performed of the neck and head with contrast and  three-dimensional CT angiography reconstruction and reformat was performed. NASCET criteria as needed. CT dose reduction was achieved through use of a  standardized protocol tailored for this examination and automatic exposure  control for dose modulation. Mild atherosclerosis at the carotid bulbs bilaterally. There is stenosis at the  distal intracranial segment of the right vertebral artery. The basilar artery is  patent. The anterior cerebral arteries are patent bilaterally. There is  multifocal stenosis in the P2 segment of the left posterior cerebral artery. The  dural venous sinuses are patent. Impression IMPRESSION:    Stenosis at the distal segment of the right vertebral artery and multifocal  stenosis in the P2 segment of the left posterior cerebral artery.        Most recent MRA:  Results from [facility] encounter on 12/12/19   MRI BRAIN WO CONT    Narrative MRI BRAIN WITHOUT CONTRAST 12/12/2019    HISTORY: 23-year-old with hypertension and acute neurological deficit on waking. TECHNIQUE: Sagittal and axial T1-weighted, axial T2-weighted, axial and coronal  FLAIR, axial T2-weighted gradient-echo, axial diffusion weighted images with ADC  maps of the brain. COMPARISON: Head CT December 12, 2019    FINDINGS: On the diffusion weighted sequence there is a focus of restricted  diffusion in the deep left periventricular white matter and corpus callosum  posteriorly with associated hyperintense FLAIR signal. There is a focus first of  restricted diffusion in the left cerebellar hemisphere posteriorly. There is  variable restricted diffusion within the right paramedian yariel. There is  variable hyperintense FLAIR signal within the yariel that may be associated with  older ischemic events. Old infarcts are present in the thalami, left basal ganglia and left corona  radiata. There is no acute intracranial hemorrhage. Specifically there is no thalamic  hemorrhage. On the T2-weighted and FLAIR sequences, there are white matter  hyperintensities compatible with chronic small vessel ischemic disease. A  subcutaneous nodule in the left frontoparietal scalp is likely a sebaceous cyst.    There is no hydrocephalus, intra-axial mass or extra-axial hematoma. Impression IMPRESSION:    1. Acute to early subacute infarcts in the left cerebellar hemisphere, in the  periventricular deep left frontoparietal white matter and likely additional  areas of restricted diffusion in the right paramedian yariel. 2. Old lacunar infarcts in the corpus striatum and periventricular white matter  as well as within the left corona radiata. Most recent Echo:  No results found for this visit on 12/12/19.         Assessment:     Angi Angeles seen as a code S with awakening stroke time last known at baseline 10:30 PM the previous day with left-sided weakness. .   Initial NIHSS was 8. CT of the head was obtained and was thought to perhaps show evidence of hemorrhage in the left thalamus. CTA of head neck was obtained and evaluation of IPH looking for source of bleeding or \"spot\" sign. .   The patient was not a candidate for alteplase because Question of hemorrhage and presenting outside of the 4.5-hour time window. Hyperacute MRI of the brain was performed. This confirmed that the questionable area in the thalamus was not hemorrhage but it did show multifocal anterior and posterior circulation areas of restricted diffusion consistent with embolic cerebral infarction. The patient was not a candidate for mechanical thrombectomy, as no large vessel occlusion was identified on CTA.        Plan:     · Stand or speech therapy evaluation  · Start ASA 81 mg daily versus aspirin suppository  · Initiate high intensity statin   · Neurochecks Q4H  · MRI of brain done  · CTA of head and neck done  · Bedside swallow test   · Labs: A1c, FLP, TSH, Cardiac enzymes, BMP, CBC  · Telemetry and echocardiogram with bubble study  · PT/OT/ST  · DVT prophylaxis   · BP management - allow permissive HTN to 220/120 x24 hours, treat with labetalol 10 mg IV or nicardipine gtt  · Smoking cessation if applicable   · Diabetes education if applicable   · Depression Screening prior to discharge      Signed By: Poly Feldman MD     December 12, 2019    Critical care billing  60 minutes in direct patient care including documentation

## 2019-12-12 NOTE — ED PROVIDER NOTES
38-year-old gentleman went to bed last night around 10:30 PM in his usual state of health. Around midnight he awoke to go to the bathroom. Found he was having difficulties with his left side with some weakness and he fell. Patient was able to get back into bed but then fell again this morning at which point 911 was summoned. EMS states his initial blood pressure was 300/160, patient has no prior history of stroke or CVA. MS states they were giving him some nitroglycerin for the blood pressure and that is when they noticed the left facial droop with left arm and left leg weakness.            Past Medical History:   Diagnosis Date    Acute pancreatitis 11/19/2014    Diabetes ([facility] 2002    [facility]     Diabetes mellitus     Hypertension        Past Surgical History:   Procedure Laterality Date    HX HERNIA REPAIR      HX ORTHOPAEDIC      carpal tunnel surgery         Family History:   Problem Relation Age of Onset    Diabetes Mother     Diabetes Father        Social History     Socioeconomic History    Marital status:      Spouse name: Not on file    Number of children: Not on file    Years of education: Not on file    Highest education level: Not on file   Occupational History    Not on file   Social Needs    Financial resource strain: Not on file    Food insecurity:     Worry: Not on file     Inability: Not on file    Transportation needs:     Medical: Not on file     Non-medical: Not on file   Tobacco Use    Smoking status: Current Every Day Smoker     Packs/day: 0.25     Types: Cigarettes    Smokeless tobacco: Former User     Types: Chew   Substance and Sexual Activity    Alcohol use: No    Drug use: No    Sexual activity: Yes     Partners: Female     Birth control/protection: None   Lifestyle    Physical activity:     Days per week: Not on file     Minutes per session: Not on file    Stress: Not on file   Relationships    Social connections:     Talks on phone: Not on file Gets together: Not on file     Attends Zoroastrianism service: Not on file     Active member of club or organization: Not on file     Attends meetings of clubs or organizations: Not on file     Relationship status: Not on file    Intimate partner violence:     Fear of current or ex partner: Not on file     Emotionally abused: Not on file     Physically abused: Not on file     Forced sexual activity: Not on file   Other Topics Concern    Not on file   Social History Narrative    Not on file         ALLERGIES: Patient has no known allergies. Review of Systems   Unable to perform ROS: Acuity of condition   Constitutional: Negative for chills and fever. HENT: Negative for congestion, rhinorrhea and sore throat. Eyes: Negative for discharge and redness. Respiratory: Negative for cough and shortness of breath. Cardiovascular: Negative for chest pain. Gastrointestinal: Negative for abdominal pain, nausea and vomiting. Skin: Negative for rash. Neurological: Positive for facial asymmetry, speech difficulty, weakness and numbness. Negative for dizziness and headaches. Vitals:    12/12/19 0906 12/12/19 0915 12/12/19 0917   BP:  (!) 196/103 (!) 178/99   Pulse:  90 77   Resp:  16 16   SpO2:  99% 98%   Weight: 95.3 kg (210 lb)     Height: 5' 6\" (1.676 m)              Physical Exam  Vitals signs and nursing note reviewed. Constitutional:       General: He is not in acute distress. Appearance: He is well-developed. He is not diaphoretic. HENT:      Head: Normocephalic and atraumatic. Eyes:      General: No scleral icterus. Right eye: No discharge. Left eye: No discharge. Conjunctiva/sclera: Conjunctivae normal.      Pupils: Pupils are equal, round, and reactive to light. Neck:      Musculoskeletal: Normal range of motion and neck supple. Cardiovascular:      Rate and Rhythm: Normal rate and regular rhythm. Heart sounds: Normal heart sounds. No murmur. No gallop. Pulmonary:      Effort: Pulmonary effort is normal. No respiratory distress. Breath sounds: Normal breath sounds. No wheezing or rales. Musculoskeletal: Normal range of motion. Skin:     General: Skin is warm and dry. Neurological:      Mental Status: He is alert and oriented to person, place, and time. Cranial Nerves: Cranial nerve deficit present. Sensory: Sensory deficit present. Motor: Weakness present. No abnormal muscle tone. Comments: cni 2-12 grossly except for left-sided central 7 palsy. Mild dysarthria. Unable to lift left leg at all off of the stretcher,  Barely able to lift left arm off of his lap against gravity     left hypoesthesia   Psychiatric:         Mood and Affect: Mood normal.         Behavior: Behavior normal.          MDM  Number of Diagnoses or Management Options  Diagnosis management comments: Medical decision making note:  Code stroke notified on arrival while patient was still on the EMS stretcher CT head, CTA head and neck, CT perfusion were ordered. Blood pressure already proved from EMSs value of 300/160, first pressure in the ER is 178/99. Awaiting head CT before making decisions to medicate the blood pressure. This concludes the \"medical decision making note\" part of this emergency department visit note.       ED Course as of Dec 12 0918   Thu Dec 12, 2019   0909 9:03 code s paged  9:04 dr Andrei Bush notified  9:05 pt off to [facility]  9:11 Neurointerventional notified    [JF]      ED Course User Index  [JF] Stone Armenta MD       Procedures

## 2019-12-13 ENCOUNTER — APPOINTMENT (OUTPATIENT)
Dept: ULTRASOUND IMAGING | Age: 44
DRG: 045 | End: 2019-12-13
Attending: INTERNAL MEDICINE
Payer: MEDICAID

## 2019-12-13 LAB
ANION GAP SERPL CALC-SCNC: 5 MMOL/L (ref 7–16)
BUN SERPL-MCNC: 9 MG/DL (ref 6–23)
CALCIUM SERPL-MCNC: 8.3 MG/DL (ref 8.3–10.4)
CHLORIDE SERPL-SCNC: 110 MMOL/L (ref 98–107)
CHOLEST SERPL-MCNC: 354 MG/DL
CO2 SERPL-SCNC: 30 MMOL/L (ref 21–32)
CREAT SERPL-MCNC: 1.06 MG/DL (ref 0.8–1.5)
ERYTHROCYTE [DISTWIDTH] IN BLOOD BY AUTOMATED COUNT: 18.6 % (ref 11.9–14.6)
EST. AVERAGE GLUCOSE BLD GHB EST-MCNC: 194 MG/DL
GLUCOSE BLD STRIP.AUTO-MCNC: 139 MG/DL (ref 65–100)
GLUCOSE BLD STRIP.AUTO-MCNC: 146 MG/DL (ref 65–100)
GLUCOSE BLD STRIP.AUTO-MCNC: 178 MG/DL (ref 65–100)
GLUCOSE BLD STRIP.AUTO-MCNC: 267 MG/DL (ref 65–100)
GLUCOSE SERPL-MCNC: 143 MG/DL (ref 65–100)
HBA1C MFR BLD: 8.4 % (ref 4.8–6)
HCT VFR BLD AUTO: 35.7 % (ref 41.1–50.3)
HDLC SERPL-MCNC: 54 MG/DL (ref 40–60)
HDLC SERPL: 6.6 {RATIO}
HGB BLD-MCNC: 11.2 G/DL (ref 13.6–17.2)
LDLC SERPL CALC-MCNC: ABNORMAL MG/DL
LDLC SERPL DIRECT ASSAY-MCNC: 231 MG/DL
LIPID PROFILE,FLP: ABNORMAL
MAGNESIUM SERPL-MCNC: 1.6 MG/DL (ref 1.8–2.4)
MCH RBC QN AUTO: 25 PG (ref 26.1–32.9)
MCHC RBC AUTO-ENTMCNC: 31.4 G/DL (ref 31.4–35)
MCV RBC AUTO: 79.7 FL (ref 79.6–97.8)
MM INDURATION POC: 0 MM (ref 0–5)
NRBC # BLD: 0 K/UL (ref 0–0.2)
PLATELET # BLD AUTO: 214 K/UL (ref 150–450)
PMV BLD AUTO: 10.1 FL (ref 9.4–12.3)
POTASSIUM SERPL-SCNC: 3.1 MMOL/L (ref 3.5–5.1)
PPD POC: NEGATIVE NEGATIVE
RBC # BLD AUTO: 4.48 M/UL (ref 4.23–5.6)
SODIUM SERPL-SCNC: 145 MMOL/L (ref 136–145)
TRIGL SERPL-MCNC: 420 MG/DL (ref 35–150)
VLDLC SERPL CALC-MCNC: 84 MG/DL (ref 6–23)
WBC # BLD AUTO: 6.6 K/UL (ref 4.3–11.1)

## 2019-12-13 PROCEDURE — 36415 COLL VENOUS BLD VENIPUNCTURE: CPT

## 2019-12-13 PROCEDURE — 83735 ASSAY OF MAGNESIUM: CPT

## 2019-12-13 PROCEDURE — 74011636637 HC RX REV CODE- 636/637: Performed by: INTERNAL MEDICINE

## 2019-12-13 PROCEDURE — 93970 EXTREMITY STUDY: CPT

## 2019-12-13 PROCEDURE — 83721 ASSAY OF BLOOD LIPOPROTEIN: CPT

## 2019-12-13 PROCEDURE — 74011250637 HC RX REV CODE- 250/637: Performed by: INTERNAL MEDICINE

## 2019-12-13 PROCEDURE — 97112 NEUROMUSCULAR REEDUCATION: CPT

## 2019-12-13 PROCEDURE — 80048 BASIC METABOLIC PNL TOTAL CA: CPT

## 2019-12-13 PROCEDURE — 82962 GLUCOSE BLOOD TEST: CPT

## 2019-12-13 PROCEDURE — 92526 ORAL FUNCTION THERAPY: CPT

## 2019-12-13 PROCEDURE — 85027 COMPLETE CBC AUTOMATED: CPT

## 2019-12-13 PROCEDURE — 83036 HEMOGLOBIN GLYCOSYLATED A1C: CPT

## 2019-12-13 PROCEDURE — 99253 IP/OBS CNSLTJ NEW/EST LOW 45: CPT | Performed by: PHYSICAL MEDICINE & REHABILITATION

## 2019-12-13 PROCEDURE — 80061 LIPID PANEL: CPT

## 2019-12-13 PROCEDURE — 74011250636 HC RX REV CODE- 250/636: Performed by: INTERNAL MEDICINE

## 2019-12-13 PROCEDURE — 97162 PT EVAL MOD COMPLEX 30 MIN: CPT

## 2019-12-13 PROCEDURE — 97530 THERAPEUTIC ACTIVITIES: CPT

## 2019-12-13 PROCEDURE — 65660000000 HC RM CCU STEPDOWN

## 2019-12-13 RX ORDER — AMLODIPINE BESYLATE 10 MG/1
10 TABLET ORAL DAILY
Status: DISCONTINUED | OUTPATIENT
Start: 2019-12-13 | End: 2020-01-07

## 2019-12-13 RX ORDER — HYDRALAZINE HYDROCHLORIDE 20 MG/ML
20 INJECTION INTRAMUSCULAR; INTRAVENOUS
Status: DISCONTINUED | OUTPATIENT
Start: 2019-12-13 | End: 2020-06-10 | Stop reason: HOSPADM

## 2019-12-13 RX ORDER — CLONIDINE HYDROCHLORIDE 0.2 MG/1
0.1 TABLET ORAL
Status: DISCONTINUED | OUTPATIENT
Start: 2019-12-13 | End: 2019-12-26

## 2019-12-13 RX ORDER — ATORVASTATIN CALCIUM 40 MG/1
80 TABLET, FILM COATED ORAL
Status: DISCONTINUED | OUTPATIENT
Start: 2019-12-13 | End: 2020-06-10 | Stop reason: HOSPADM

## 2019-12-13 RX ADMIN — ATORVASTATIN CALCIUM 80 MG: 80 TABLET, FILM COATED ORAL at 21:45

## 2019-12-13 RX ADMIN — INSULIN LISPRO 2 UNITS: 100 INJECTION, SOLUTION INTRAVENOUS; SUBCUTANEOUS at 21:45

## 2019-12-13 RX ADMIN — HYDRALAZINE HYDROCHLORIDE 20 MG: 20 INJECTION, SOLUTION INTRAMUSCULAR; INTRAVENOUS at 21:00

## 2019-12-13 RX ADMIN — INSULIN LISPRO 6 UNITS: 100 INJECTION, SOLUTION INTRAVENOUS; SUBCUTANEOUS at 12:38

## 2019-12-13 RX ADMIN — ENOXAPARIN SODIUM 40 MG: 40 INJECTION SUBCUTANEOUS at 13:17

## 2019-12-13 RX ADMIN — Medication 5 ML: at 21:46

## 2019-12-13 RX ADMIN — Medication 10 ML: at 13:17

## 2019-12-13 RX ADMIN — ASPIRIN 81 MG 81 MG: 81 TABLET ORAL at 09:53

## 2019-12-13 RX ADMIN — LISINOPRIL 40 MG: 20 TABLET ORAL at 09:00

## 2019-12-13 RX ADMIN — CLONIDINE HYDROCHLORIDE 0.1 MG: 0.2 TABLET ORAL at 12:38

## 2019-12-13 RX ADMIN — Medication 5 ML: at 05:49

## 2019-12-13 RX ADMIN — ACETAMINOPHEN 650 MG: 325 TABLET, FILM COATED ORAL at 18:18

## 2019-12-13 RX ADMIN — HYDRALAZINE HYDROCHLORIDE 20 MG: 20 INJECTION, SOLUTION INTRAMUSCULAR; INTRAVENOUS at 10:02

## 2019-12-13 RX ADMIN — AMLODIPINE BESYLATE 10 MG: 10 TABLET ORAL at 12:38

## 2019-12-13 NOTE — PROGRESS NOTES
Problem: Mobility Impaired (Adult and Pediatric)  Goal: *Acute Goals and Plan of Care  Description  STG:  (1.) Mr. Sebastián Ramos will move from supine to sit and sit to supine , scoot up and down and roll side to side with MINIMAL ASSIST within 3 treatment day(s). (2.) Mr. Sebastián Ramos will transfer from bed to chair and chair to bed with MODERATE ASSIST using the least restrictive device within 3 treatment day(s). (3.) Mr. Sebastián Ramos will perform standing static and dynamic balance activities x 10 minutes with MODERATE ASSISTx1 to improve safety within 3 treatment day(s). LTG:  (1.) Mr. Sebastián Ramos will move from supine to sit and sit to supine , scoot up and down and roll side to side with CONTACT GUARD ASSIST within 7 treatment day(s). (2.) Mr. Sebastián Ramos will transfer from bed to chair and chair to bed with MINIMAL ASSIST using the least restrictive device within 7 treatment day(s). (3.) Mr. Sebastián Ramos will ambulate with MODERATE ASSIST for 50+ feet with the least restrictive device within 7 treatment day(s). (4.) Mr. Sebastián Ramos will perform standing static and dynamic balance activities x 20 minutes with MINIMAL ASSIST to improve safety within 7 treatment day(s). (5.) Mr. Sebastián Ramos will perform bilateral lower extremity exercises x 15 min for HEP with SUPERVISION to improve strength, endurance, and functional mobility within 7 treatment day(s).      PHYSICAL THERAPY: Initial Assessment, Daily Note, and AM 12/13/2019  INPATIENT: PT Visit Days : 1  Payor: SELF PAY / Plan: [facility] SELF PAY / Product Type: Self Pay /       NAME/AGE/GENDER: Avelino Bacon is a 53 y.o. male   PRIMARY DIAGNOSIS: Acute ischemic stroke ([address] [I63.9]  Cerebrovascular accident (CVA) due to embolism ([address] [I63.9] Acute ischemic stroke ([address] Acute ischemic stroke ([address]        ICD-10: Treatment Diagnosis:   Other lack of cordination (R27.8)  Difficulty in walking, Not elsewhere classified (R26.2)  Other abnormalities of gait and mobility (R26.89)  Unspecified Lack of Coordination (R27.9)  Hemiplegia and hemiparesis following cerebral infarction affecting   left non-dominant side (R42.269)   Precaution/Allergies:  Patient has no known allergies. ASSESSMENT:     Mr. Anoop Myers is a 53year old M who presents to hospital with L sided facial droop and LUE and LLE weakness. MRI indicated acute infarcts in L cerebellar hemisphere, deep frontoparietal white matter and R paramedian yariel. Prior to hospital admission pt lives with his wife in a one story apartment on the second level with 12 steps to access. Pt endorses no falls in past 6 months. Prior to admission Mr. Anoop Myers uses no DME for mobility. He reports he had been working full time but got laid off, so now he works just a few days at [employer], which requires him being on his feet. Upon entering, pt resting in bed, wife at bedside, agreeable to PT evaluation. he reports 6/10 pain in his LLE at rest. BLE assessment indicates sensation to light touch intact, AROM absent LLE, WFL RLE, and strength grossly diminished LLE (0/5 to 1/5 all movements), RLE WFL (grossly 4/5). LUE also with profound weakness. Pt performed supine > sit with Mod A and additional time, sitting EOB with fair sitting balance control. Mod A to scoot towards edge of bed with additional time and cues. Treatment initiated to include sitting edge of bed performing functional tasks to work on trunk control and sitting balance control. PT had to assist pt with positioning and weight bearing thru LLE and LUE. Sit > stand with Mod A x2 using RW, significant assist on L side to maintain LUE and LLE positioning and coordination, significant L sided lean. Static standing to get cleaned up with Mod A x2, poor standing balance control. Returned to sit. Performed again, this time using urinal with assist. Returned to sit. One more trial with addition of side steps Mod-Max Ax2 with assist with progression of LLE towards head of bed.  Pt fatigued after exertion, sit > supine with Mod A. At end of session pt resting in bed with all needs within reach, alarm activated for safety, wife at bedside, LLE and LUE positioned properly and in neutral with pillows and blankets, RN notified. Pt presents as functioning below his baseline, with deficits in mobility including transfers, gait, balance, and activity tolerance. Pt will benefit from skilled therapy services to address stated deficits to promote return to highest level of function, independence, and safety. Will continue to follow. This section established at most recent assessment   PROBLEM LIST (Impairments causing functional limitations):  Decreased Strength  Decreased ADL/Functional Activities  Decreased Transfer Abilities  Decreased Ambulation Ability/Technique  Decreased Balance  Increased Pain  Decreased Activity Tolerance  Increased Fatigue  Decreased Flexibility/Joint Mobility   INTERVENTIONS PLANNED: (Benefits and precautions of physical therapy have been discussed with the patient.)  Balance Exercise  Bed Mobility  Family Education  Gait Training  Home Exercise Program (HEP)  Manual Therapy  Neuromuscular Re-education/Strengthening  Range of Motion (ROM)  Therapeutic Activites  Therapeutic Exercise/Strengthening  Transfer Training     TREATMENT PLAN: Frequency/Duration: 3 times a week for duration of hospital stay  Rehabilitation Potential For Stated Goals: [facility] (at time of discharge pending progress):    Placement: It is my opinion, based on this patient's performance to date, that Mr. Jonathon Smith may benefit from intensive therapy at an [facility] after discharge due to a probable need for 24 hour rehab nursing, a probable need for multiple therapy disciplines, and potential to make ongoing and sustainable functional improvement that is of practical value. .  Equipment:   TBD pending progress with therapy.                HISTORY:   History of Present Injury/Illness (Reason for Referral):  Per H&P: \"Pt is a 52 y/o smoker with DM, HTN, who presented to ER with L leg and arm weakness, L facial droop, dysarthria. First noted L leg weakness late night 12/11 when he woke up to go to the bathroom. He was normal when he went to bed around 1030. Woke up this morning and had persistent weakness L leg and also now noted in L arm. EMS called. Noted to have slurred speech and L facial droop as well. Code S called in ER around 9am.  MRI with acute infarcts in L cerebellar hemisphere, deep frontoparietal white matter, and R paramedian yariel. Also noted old lacunar infarcts. No large vessel occlusion on CTA, but some stenosis noted. No hx afib, TIA, CVA. No CP, palpitations, SOB. \"  Past Medical History/Comorbidities:   Mr. Brianna Mcgarry  has a past medical history of Acute ischemic stroke ([address] (12/12/2019), Acute pancreatitis (11/19/2014), Cerebrovascular accident (CVA) due to embolism ([address] (12/12/2019), Diabetes ([address] (2002), Diabetes ([address] Diabetes mellitus, and Hypertension. He also has no past medical history of Arthritis, Asthma, Autoimmune disease ([address] CAD (coronary artery disease), Cancer ([address] Chronic kidney disease, COPD, Dementia, Dementia ([address] Heart failure ([address] Ill-defined condition, Infectious disease, Liver disease, Other ill-defined conditions(799.89), Psychiatric disorder, PUD (peptic ulcer disease), Seizures ([address] or Sleep disorder. Mr. Brianna Mcgarry  has a past surgical history that includes hx hernia repair and hx orthopaedic.   Social History/Living Environment:   Home Environment: Apartment  # Steps to Enter: 12  Rails to Enter: Yes  [employer] : Bilateral  One/Two Story Residence: One story  Living Alone: No  Support Systems: Spouse/Significant Other/Partner  Patient Expects to be Discharged to[de-identified] Unknown  Current DME Used/Available at Home: None  Tub or Shower Type: Tub/Shower combination  Prior Level of Function/Work/Activity:  Independent, lives with wife in 2nd story 1 level apartment. No recent falls. Number of Personal Factors/Comorbidities that affect the Plan of Care: 1-2: MODERATE COMPLEXITY   EXAMINATION:   Most Recent Physical Functioning:   Gross Assessment:  AROM: Generally decreased, functional(absent LLE and LUE)  Strength: Generally decreased, functional(4/5 RLE, 0 to 1/5 LLE)  Sensation: Intact               Posture:     Balance:  Sitting: Impaired  Sitting - Static: Fair (occasional)  Sitting - Dynamic: Poor (constant support)  Standing: Impaired  Standing - Static: Poor  Standing - Dynamic : Poor Bed Mobility:  Supine to Sit: Moderate assistance  Sit to Supine: Moderate assistance  Scooting: Moderate assistance  Interventions: Safety awareness training; Tactile cues; Verbal cues  Wheelchair Mobility:     Transfers:  Sit to Stand: Moderate assistance;Assist x2  Stand to Sit: Moderate assistance;Assist x2  Bed to Chair: Moderate assistance;Assist x2  Interventions: Safety awareness training; Tactile cues; Verbal cues  Gait:     Base of Support: Center of gravity altered;Narrowed; Shift to right  Speed/Clotilde: Delayed; Shuffled; Slow  Step Length: Left shortened;Right shortened  Gait Abnormalities: Decreased step clearance; Hemiplegic;Trunk sway increased  Distance (ft): 5 Feet (ft)(side steps edge of bed)  Assistive Device: Walker, rolling;Gait belt  Ambulation - Level of Assistance:  Moderate assistance;Assist x2      Body Structures Involved:  Nerves  Voice/Speech  Bones  Joints  Muscles Body Functions Affected:  Mental  Sensory/Pain  Neuromusculoskeletal  Movement Related Activities and Participation Affected:  General Tasks and Demands  Mobility  Self Care  Interpersonal Interactions and Relationships   Number of elements that affect the Plan of Care: 4+: HIGH COMPLEXITY   CLINICAL PRESENTATION:   Presentation: Evolving clinical presentation with changing clinical characteristics: MODERATE COMPLEXITY CLINICAL DECISION [address]IN[address] [place] [address] AM-PAC 6 Clicks   Basic Mobility Inpatient Short Form  How much difficulty does the patient currently have. .. Unable A Lot A Little None   1. Turning over in bed (including adjusting bedclothes, sheets and blankets)? [] 1   [] 2   [x] 3   [] 4   2. Sitting down on and standing up from a chair with arms ( e.g., wheelchair, bedside commode, etc.)   [] 1   [x] 2   [] 3   [] 4   3. Moving from lying on back to sitting on the side of the bed? [] 1   [x] 2   [] 3   [] 4   How much help from another person does the patient currently need. .. Total A Lot A Little None   4. Moving to and from a bed to a chair (including a wheelchair)? [] 1   [x] 2   [] 3   [] 4   5. Need to walk in hospital room? [] 1   [x] 2   [] 3   [] 4   6. Climbing 3-5 steps with a railing? [] 1   [x] 2   [] 3   [] 4   © 2007, Trustees of [address] [place] [address], under license to [employer]. All rights reserved      Score:  Initial: 13 Most Recent: X (Date: -- )    Interpretation of Tool:  Represents activities that are increasingly more difficult (i.e. Bed mobility, Transfers, Gait). Medical Necessity:     Patient is expected to demonstrate progress in   strength, range of motion, balance, coordination, and functional technique   to   increase independence with all mobility. .  Reason for Services/Other Comments:  Patient continues to require skilled intervention due to   medical complications and mobility deficits which impact his level of function, safety, and independence as indicated above.    .   Use of outcome tool(s) and clinical judgement create a POC that gives a: Questionable prediction of patient's progress: MODERATE COMPLEXITY        TREATMENT:   (In addition to Assessment/Re-Assessment sessions the following treatments were rendered)   Pre-treatment Symptoms/Complaints:  \"I need to use the bathroom\"  Pain: Initial:   Pain Intensity 1: 6  Pain Location 1: Leg  Pain Orientation 1: Left  Post Session:  No pain reported at end of session, resting comfortably in bed. Therapeutic Activity: (   10 Minutes): Therapeutic activities including bed mobility, sit <> stand transfer training and side steps at edge of bed to improve mobility, strength, balance, and coordination. Required maximal cues with moderate physical assist   to promote coordination of bilateral, upper extremity(s), lower extremity(s) and promote motor control of bilateral, upper extremity(s), lower extremity(s). Neuromuscular Re-education: ( 14 Minutes):  Sitting static and dynamic balance activities, and static standing balance control training and dynamic standing balance control training to improve balance, coordination, kinesthetic sense, and posture. Required maximal visual, verbal, manual, and tactile cues to promote static and dynamic balance in standing, promote coordination of left, upper extremity(s), lower extremity(s), and promote motor control of left, upper extremity(s), lower extremity(s). Braces/Orthotics/Lines/Etc:   O2 Device: Room Air   Treatment/Session Assessment:    Response to Treatment:  see above. Works very hard with therapy. Interdisciplinary Collaboration:   Physical Therapist  Registered Nurse  Certified Nursing Assistant/Patient Care Technician  After treatment position/precautions:   Supine in bed  Bed alarm/tab alert on  Bed/Chair-wheels locked  Bed in low position  Call light within reach  RN notified  Family at bedside   Compliance with Program/Exercises: Will assess as treatment progresses  Recommendations/Intent for next treatment session: \"Next visit will focus on advancements to more challenging activities and reduction in assistance provided\".     Total Treatment Duration:  PT Patient Time In/Time Out  Time In: 0852  Time Out: [address]

## 2019-12-13 NOTE — PROGRESS NOTES
Pt off floor for US, will follow up for evaluation as schedule/ pt's status allows.     Versa Shashi, OT

## 2019-12-13 NOTE — PROGRESS NOTES
Neurology Daily Progress Note     Assessment:     Embolic stroke, multiple vascular territories. Will consult cardiology for ISMAEL to evaluate PFO; if ISMAEL negative patient would need long term cardiac monitor for possible PAF. BLE Venous duplex negative for DVT. Recommend diabetic education for A1C of 8.4. Recommend starting patient on Vascepa 2gm po BID or alternative as an outpatient for elevated triglycerides of 420. This will need to be started as an outpatient due to not being on hospital formulary. Plan:     · Continue ASA 81 mg daily   · Continue high intensity statin ; goal <70  · Recommend starting patient on Vascepa 2gm po BID as an outpatient given elevated triglycerides of 420  · Neurochecks Q4H  · Cardiology consult for ISMAEL to evaluate PFO for possible closure; if ISMAEL negative patient would need long term cardiac monitor for possible PAF. · Telemetry   · PT/OT/ST  · DVT prophylaxis   · BP management - normotensive, with long-term goal <130/80  · Smoking cessation if applicable   · Diabetes education A1C 8.4; goal <7.0  · Depression Screening prior to discharge      Subjective: Interval history:    Patient resting. Continues to have left hemiparesis, dysarthria. TTE demonstrated PFO, negative for atrial enlargement. Will consult cardiology for ISMAEL to evaluate PFO; if ISMAEL negative patient would need long term cardiac monitor for possible PAF. BLE Venous duplex negative for DVT. History:    Pancho Chin is a 53 y.o. male who is being seen for Code S. Review of systems negative with exception of pertinent positives and negatives noted above.        Objective:     Vitals:    12/13/19 0800 12/13/19 1002 12/13/19 1200 12/13/19 1600   BP: (!) 178/101 (!) 180/112 (!) 180/96 154/90   Pulse: 89  99 89   Resp: 18  18 17   Temp: 97.9 °F (36.6 °C)  98 °F (36.7 °C) 98.7 °F (37.1 °C)   SpO2: 97%  100% 93%   Weight:       Height:              Current Facility-Administered Medications:    hydrALAZINE (APRESOLINE) 20 mg/mL injection 20 mg, 20 mg, IntraVENous, Q4H PRN, Kait Bobby MD, 20 mg at 12/13/19 1002    atorvastatin (LIPITOR) tablet 80 mg, 80 mg, Oral, QHS, Kait Bobby MD    amLODIPine [facility]) tablet 10 mg, 10 mg, Oral, DAILY, Kait Bobby MD, 10 mg at 12/13/19 1238    cloNIDine HCl (CATAPRES) tablet 0.1 mg, 0.1 mg, Oral, Q4H PRN, Kait Bobby MD, 0.1 mg at 12/13/19 1238    lisinopril (PRINIVIL, ZESTRIL) tablet 40 mg, 40 mg, Oral, DAILY, Kait Bobby MD, 40 mg at 12/13/19 0900    tuberculin injection 5 Units, 5 Units, IntraDERMal, ONCE, Kait Bobby MD, 5 Units at 12/12/19 1802    sodium chloride (NS) flush 5-40 mL, 5-40 mL, IntraVENous, Q8H, Kait Bobby MD, 10 mL at 12/13/19 1317    sodium chloride (NS) flush 5-40 mL, 5-40 mL, IntraVENous, PRN, Kait Bobby MD    ondansetron [facility]) injection 4 mg, 4 mg, IntraVENous, Q4H PRN, Kait Bobby MD    aspirin chewable tablet 81 mg, 81 mg, Oral, DAILY, Kait Bobby MD, 81 mg at 12/13/19 6863    acetaminophen (TYLENOL) tablet 650 mg, 650 mg, Oral, Q4H PRN, Kait Bobby MD, 650 mg at 12/12/19 1950    polyethylene glycol (MIRALAX) packet 17 g, 17 g, Oral, DAILY PRN, Kait Bobby MD    enoxaparin (LOVENOX) injection 40 mg, 40 mg, SubCUTAneous, Q24H, Kait Bobby MD, 40 mg at 12/13/19 1317    insulin lispro (HUMALOG) injection, , SubCUTAneous, AC&HS, Kait Bobby MD, Stopped at 12/13/19 1630    dextrose 40% (GLUTOSE) oral gel 1 Tube, 15 g, Oral, PRN, Kati Bobby MD    glucagon [facility]) injection 1 mg, 1 mg, IntraMUSCular, PRN, Kait Bobby MD    dextrose (D50W) injection syrg 12.5-25 g, 25-50 mL, IntraVENous, Kait HOFFMANN MD    Recent Results (from the past 12 hour(s))   GLUCOSE, POC    Collection Time: 12/13/19  6:58 AM   Result Value Ref Range    Glucose (POC) 146 (H) 65 - 100 mg/dL   GLUCOSE, POC    Collection Time: 12/13/19 10:41 AM   Result Value Ref Range    Glucose (POC) 267 (H) 65 - 100 mg/dL   GLUCOSE, POC    Collection Time: 12/13/19  3:58 PM   Result Value Ref Range    Glucose (POC) 139 (H) 65 - 100 mg/dL   PLEASE READ & DOCUMENT PPD TEST IN 24 HRS    Collection Time: 12/13/19  5:32 PM   Result Value Ref Range    PPD Negative Negative    mm Induration 0 0 - 5 mm     MRI Results (most recent):  Results from [place] encounter on 12/12/19   MRI BRAIN WO CONT    Narrative MRI BRAIN WITHOUT CONTRAST 12/12/2019    HISTORY: 25-year-old with hypertension and acute neurological deficit on waking. TECHNIQUE: Sagittal and axial T1-weighted, axial T2-weighted, axial and coronal  FLAIR, axial T2-weighted gradient-echo, axial diffusion weighted images with ADC  maps of the brain. COMPARISON: Head CT December 12, 2019    FINDINGS: On the diffusion weighted sequence there is a focus of restricted  diffusion in the deep left periventricular white matter and corpus callosum  posteriorly with associated hyperintense FLAIR signal. There is a focus first of  restricted diffusion in the left cerebellar hemisphere posteriorly. There is  variable restricted diffusion within the right paramedian yariel. There is  variable hyperintense FLAIR signal within the yariel that may be associated with  older ischemic events. Old infarcts are present in the thalami, left basal ganglia and left corona  radiata. There is no acute intracranial hemorrhage. Specifically there is no thalamic  hemorrhage. On the T2-weighted and FLAIR sequences, there are white matter  hyperintensities compatible with chronic small vessel ischemic disease. A  subcutaneous nodule in the left frontoparietal scalp is likely a sebaceous cyst.    There is no hydrocephalus, intra-axial mass or extra-axial hematoma. Impression IMPRESSION:    1.  Acute to early subacute infarcts in the left cerebellar hemisphere, in the  periventricular deep left frontoparietal white matter and likely additional  areas of restricted diffusion in the right paramedian yariel. 2. Old lacunar infarcts in the corpus striatum and periventricular white matter  as well as within the left corona radiata. Physical Exam:  General - Well developed, well nourished, in no apparent distress. Pleasant and conversent. HEENT - Normocephalic, atraumatic. Conjunctiva are clear. Neck - Supple without masses  Cardiovascular - Regular rate and rhythm. Normal S1, S2 without murmurs, rubs, or gallops. Lungs - Clear to auscultation. Abdomen - Soft, nontender with normal bowel sounds. Extremities - Peripheral pulses intact. No edema and no rashes. Neurological examination - Comprehension, attention, memory and reasoning are intact. Language and speech are normal.   On cranial nerve examination, (II, III, IV, VI) pupils are equal, round, and reactive to light. Visual acuity is adequate. Visual fields are full to finger confrontation. Extraocular motility is normal. (V, VII) Face is symmetric and sensation is intact to light touch. (VIII) Hearing is intact. (IX, X) Palate and uvula elevate symmetrically. Voice is normal. (XI) Shoulder shrug is strong and equal bilaterally. (XII)Tongue is midline. Motor examination - There is normal muscle tone and bulk. Power is full throughout. Muscle stretch reflexes are normoactive and there are no pathological reflexes present. Plantar response is flexor. Sensation is intact to light touch, pinprick, vibration and proprioception in all extremities.  Cerebellar examination is normal. Gait and stance are normal.       Signed By: Vani Zacarias NP     December 13, 2019

## 2019-12-13 NOTE — PROGRESS NOTES
12/13/19 1200   Vitals   Temp 98 °F (36.7 °C)   Temp Source Oral   Pulse (Heart Rate) 99   Heart Rate Source Monitor   Resp Rate 18   O2 Sat (%) 100 %   Level of Consciousness Alert   BP (!) 180/96  ( JERRY Bailey notified. )   MAP (Calculated) 124   BP 1 Location Right arm   BP 1 Method Automatic   BP Patient Position At rest   MEWS Score 1   MD notified, new orders

## 2019-12-13 NOTE — PROGRESS NOTES
Nutrition  Reason for assessment: Consult for nutrition stroke education    Assessment:   Diet: DIET DIABETIC CONSISTENT CARB Pureed; AHA-LOW-CHOL FAT    Food/Nutrition Patient History:    Pt is 52 yo male who presented with weakness, facial drooping and slurred speech. He has history of CVA, DM type 2, HTN, pancreatitis, GERD and a smoker. Pt was awake in bed having just finished lunch at time of visit, wife was present in room. Pt reports prior to coming to the hospital was eating fine with no difficulty. Pt reports no weight loss. Pt reports that he understands the reason for the puree diet. He seemed concern that he was not getting enough with the puree diet and also because he was not excited or interested in all puree options. Explained about adding supplement for protein and calories and pt was willing and interested. Also explained importance of following the puree diet until speech approves advancement, pt understood. Pt has no other concerns or questions at this time. Labs:   12/13/2019 04:47   Sodium 145   Potassium 3.1 (L)   Chloride 110 (H)   CO2 30   Anion gap 5 (L)   Glucose 143 (H)   BUN 9   Creatinine 1.06   Calcium 8.3   Magnesium 1.6 (L)   GFR est non-AA >60   GFR est AA >60     Anthropometrics:  Height: 5' 6\" (167.6 cm), Weight: 95.3 kg (210 lb),  , Body mass index is 33.89 kg/m². BMI class of obese. Macronutrient needs: MSJ (using CBW (Current body weight) unknown 95.3 kg)  EER: 2130 kcal/day (22 kcals/kg )  EPR: 106 g/day (1.1 g/kg ) (20%)    Intake/Comparative Standards: No recorded intake at this time. RD able to observe pt ate about 75% of pureed lunch. Nutrition Diagnosis:  Inadequate oral intake related to decreased ability to consume sufficient energy as evidenced by puree diet and likely not meeting estimated needs. .    Nutrition Intervention:  Meals and Snacks: Continue with current diet. Commercial Beverages and Supplements: Add Glucerna TID in vanilla.    Discharge Nutrition Plan: Too soon to determine  Goal: - PO nutrition intake will meet >75% of patient estimated nutritional needs within the next 7 days.      Paulina Nix MS, RD, LD

## 2019-12-13 NOTE — PROGRESS NOTES
Hospitalist Note     Admit Date:  2019  9:07 AM   Name:  Carlos Strauss   Age:  53 y.o.  :  1975   MRN:  [id number]   PCP:  Irvin Pang MD  Treatment Team: Attending Provider: Al Rseendiz MD; Consulting Provider: Enid Louise MD; Charge Nurse: Martínez Castrejon, RN; Speech Language Pathologist: Grady Starks Occupational Therapist: Alanna Morin OT; Physical Therapist: Brett Rivers PT    HPI/Subjective:   Pt is a 54 y/o smoker with DM, HTN, who presented to ER  with L leg and arm weakness, L facial droop, dysarthria. First noted L leg weakness late night  when he woke up to go to the bathroom. He was normal when he went to bed around 1030. Woke up and had persistent weakness L leg and also now noted in L arm. EMS called. Noted to have slurred speech and L facial droop as well. Code S called in ER around 9am.  MRI with acute infarcts in L cerebellar hemisphere, deep frontoparietal white matter, and R paramedian yariel. Also noted old lacunar infarcts. No large vessel occlusion on CTA, but some stenosis noted. No hx afib, TIA, CVA. No CP, palpitations, SOB.     - pt neuro symptoms appear unchanged.   No new focal deficits, CP, SOB, or other complaints    Objective:     Patient Vitals for the past 24 hrs:   Temp Pulse Resp BP SpO2   19 0800 97.9 °F (36.6 °C) 89 18 (!) 178/101 97 %   19 0400 98.3 °F (36.8 °C) 100 20 (!) 198/93 97 %   19 0000 98.4 °F (36.9 °C) 86 20 174/89 97 %   19 2000 98.2 °F (36.8 °C) 85 20 (!) 178/99 97 %   19 1721    (!) 159/92    19 1644 97.8 °F (36.6 °C) 96 20 (!) 173/105 97 %   19 1531  92 20     19 1430  97 (!) 35 159/85    19 1400  90 23 134/64    19 1329  94 26 126/59    19 1244  95 21 126/60    19 1216  96 (!) 56 147/77    19 1140 98.4 °F (36.9 °C)       19 1026  80 16  99 %   19 1023  81 16 133/76 96 % 12/12/19 1022  95  133/76 96 %   12/12/19 1020  71 16 137/76 95 %   12/12/19 0945  83 16 153/78 98 %   12/12/19 0940  82  151/82    12/12/19 0932  85 16 145/76 98 %   12/12/19 0925  91 16 174/80 97 %   12/12/19 0922  91 16 (!) 170/91 99 %   12/12/19 0917  77 16 (!) 178/99 98 %   12/12/19 0915  90 16 (!) 196/103 99 %     Oxygen Therapy  O2 Sat (%): 97 % (12/13/19 0800)  Pulse via Oximetry: 95 beats per minute (12/12/19 1022)    Intake/Output Summary (Last 24 hours) at 12/13/2019 0914  Last data filed at 12/13/2019 0719  Gross per 24 hour   Intake    Output 700 ml   Net -700 ml       Physical Exam:  General:    Well nourished. Alert and oriented. Eyes:   Normal sclera. Extraocular movements intact. CV:   RRR. No murmur, rub, or gallop. Lungs:  Clear to auscultation bilaterally. No wheezing, rhonchi, or rales  Extremities: Warm and dry. No cyanosis or edema. Neurologic: CN II-XII intact except for L facial droop. Sensation intact. Finger to nose intact. PERRLA.  +slurred speech. No confusion. STR 2/5 LUE and LLE. Skin:     No rashes or jaundice. No wounds. Psych:  Normal mood and affect. I reviewed the labs, imaging, EKGs, telemetry, and other studies done this admission. Data Review:   Recent Results (from the past 24 hour(s))   EKG, 12 LEAD, INITIAL    Collection Time: 12/12/19 10:27 AM   Result Value Ref Range    Ventricular Rate 90 BPM    Atrial Rate 90 BPM    P-R Interval 144 ms    QRS Duration 86 ms    Q-T Interval 418 ms    QTC Calculation (Bezet) 511 ms    Calculated P Axis 61 degrees    Calculated R Axis 42 degrees    Calculated T Axis 2 degrees    Diagnosis       !! AGE AND GENDER SPECIFIC ECG ANALYSIS !!   Normal sinus rhythm  Nonspecific T wave abnormality  Prolonged QT  Abnormal ECG  When compared with ECG of 31-OCT-2016 13:45,  Nonspecific T wave abnormality, worse in Inferior leads  Nonspecific T wave abnormality, worse in Lateral leads  QT has lengthened  Confirmed by Petar Crocker MD ()QUIN ([id number]) on 12/12/2019 1:14:23 PM     GLUCOSE, POC    Collection Time: 12/12/19  2:38 PM   Result Value Ref Range    Glucose (POC) 118 (H) 65 - 100 mg/dL   GLUCOSE, POC    Collection Time: 12/12/19  5:01 PM   Result Value Ref Range    Glucose (POC) 150 (H) 65 - 100 mg/dL   GLUCOSE, POC    Collection Time: 12/12/19  9:39 PM   Result Value Ref Range    Glucose (POC) 180 (H) 65 - 100 mg/dL   CBC W/O DIFF    Collection Time: 12/13/19  4:47 AM   Result Value Ref Range    WBC 6.6 4.3 - 11.1 K/uL    RBC 4.48 4.23 - 5.6 M/uL    HGB 11.2 (L) 13.6 - 17.2 g/dL    HCT 35.7 (L) 41.1 - 50.3 %    MCV 79.7 79.6 - 97.8 FL    MCH 25.0 (L) 26.1 - 32.9 PG    MCHC 31.4 31.4 - 35.0 g/dL    RDW 18.6 (H) 11.9 - 14.6 %    PLATELET 546 514 - 244 K/uL    MPV 10.1 9.4 - 12.3 FL    ABSOLUTE NRBC 0.00 0.0 - 0.2 K/uL   LIPID PANEL    Collection Time: 12/13/19  4:47 AM   Result Value Ref Range    LIPID PROFILE          Cholesterol, total 354 (H) <200 MG/DL    Triglyceride 420 (H) 35 - 150 MG/DL    HDL Cholesterol 54 40 - 60 MG/DL    LDL, calculated Not calculated due to elevated triglyceride level <100 MG/DL    VLDL, calculated 84 (H) 6.0 - 23.0 MG/DL    CHOL/HDL Ratio 6.6     HEMOGLOBIN A1C WITH EAG    Collection Time: 12/13/19  4:47 AM   Result Value Ref Range    Hemoglobin A1c 8.4 (H) 4.8 - 6.0 %    Est. average glucose 247 mg/dL   METABOLIC PANEL, BASIC    Collection Time: 12/13/19  4:47 AM   Result Value Ref Range    Sodium 145 136 - 145 mmol/L    Potassium 3.1 (L) 3.5 - 5.1 mmol/L    Chloride 110 (H) 98 - 107 mmol/L    CO2 30 21 - 32 mmol/L    Anion gap 5 (L) 7 - 16 mmol/L    Glucose 143 (H) 65 - 100 mg/dL    BUN 9 6 - 23 MG/DL    Creatinine 1.06 0.8 - 1.5 MG/DL    GFR est AA >60 >60 ml/min/1.73m2    GFR est non-AA >60 >60 ml/min/1.73m2    Calcium 8.3 8.3 - 10.4 MG/DL   MAGNESIUM    Collection Time: 12/13/19  4:47 AM   Result Value Ref Range    Magnesium 1.6 (L) 1.8 - 2.4 mg/dL   LDL, DIRECT    Collection Time: 12/13/19  4:47 AM   Result Value Ref Range    LDL,Direct 231 (H) <100 mg/dl   GLUCOSE, POC    Collection Time: 12/13/19  6:58 AM   Result Value Ref Range    Glucose (POC) 146 (H) 65 - 100 mg/dL       All Micro Results     None          Current Facility-Administered Medications   Medication Dose Route Frequency    hydrALAZINE (APRESOLINE) 20 mg/mL injection 20 mg  20 mg IntraVENous Q4H PRN    atorvastatin (LIPITOR) tablet 80 mg  80 mg Oral QHS    lisinopril (PRINIVIL, ZESTRIL) tablet 40 mg  40 mg Oral DAILY    tuberculin injection 5 Units  5 Units IntraDERMal ONCE    sodium chloride (NS) flush 5-40 mL  5-40 mL IntraVENous Q8H    sodium chloride (NS) flush 5-40 mL  5-40 mL IntraVENous PRN    ondansetron (ZOFRAN) injection 4 mg  4 mg IntraVENous Q4H PRN    aspirin chewable tablet 81 mg  81 mg Oral DAILY    acetaminophen (TYLENOL) tablet 650 mg  650 mg Oral Q4H PRN    polyethylene glycol (MIRALAX) packet 17 g  17 g Oral DAILY PRN    enoxaparin (LOVENOX) injection 40 mg  40 mg SubCUTAneous Q24H    insulin lispro (HUMALOG) injection   SubCUTAneous AC&HS    dextrose 40% (GLUTOSE) oral gel 1 Tube  15 g Oral PRN    glucagon (GLUCAGEN) injection 1 mg  1 mg IntraMUSCular PRN    dextrose (D50W) injection syrg 12.5-25 g  25-50 mL IntraVENous PRN       Other Studies:  Mri Brain Wo Cont    Result Date: 12/12/2019  MRI BRAIN WITHOUT CONTRAST 12/12/2019 HISTORY: 53year-old with hypertension and acute neurological deficit on waking. TECHNIQUE: Sagittal and axial T1-weighted, axial T2-weighted, axial and coronal FLAIR, axial T2-weighted gradient-echo, axial diffusion weighted images with ADC maps of the brain.  COMPARISON: Head CT December 12, 2019 FINDINGS: On the diffusion weighted sequence there is a focus of restricted diffusion in the deep left periventricular white matter and corpus callosum posteriorly with associated hyperintense FLAIR signal. There is a focus first of restricted diffusion in the left cerebellar hemisphere posteriorly. There is variable restricted diffusion within the right paramedian yariel. There is variable hyperintense FLAIR signal within the yariel that may be associated with older ischemic events. Old infarcts are present in the thalami, left basal ganglia and left corona radiata. There is no acute intracranial hemorrhage. Specifically there is no thalamic hemorrhage. On the T2-weighted and FLAIR sequences, there are white matter hyperintensities compatible with chronic small vessel ischemic disease. A subcutaneous nodule in the left frontoparietal scalp is likely a sebaceous cyst. There is no hydrocephalus, intra-axial mass or extra-axial hematoma. IMPRESSION: 1. Acute to early subacute infarcts in the left cerebellar hemisphere, in the periventricular deep left frontoparietal white matter and likely additional areas of restricted diffusion in the right paramedian yariel. 2. Old lacunar infarcts in the corpus striatum and periventricular white matter as well as within the left martinez radiata. Cta Code Neuro Head And Neck W Cont    Result Date: 12/12/2019  History: Code stroke. FINDINGS: CT angiography was performed of the neck and head with contrast and three-dimensional CT angiography reconstruction and reformat was performed. NASCET criteria as needed. CT dose reduction was achieved through use of a standardized protocol tailored for this examination and automatic exposure control for dose modulation. Mild atherosclerosis at the carotid bulbs bilaterally. There is stenosis at the distal intracranial segment of the right vertebral artery. The basilar artery is patent. The anterior cerebral arteries are patent bilaterally. There is multifocal stenosis in the P2 segment of the left posterior cerebral artery. The dural venous sinuses are patent.      IMPRESSION: Stenosis at the distal segment of the right vertebral artery and multifocal stenosis in the P2 segment of the left posterior cerebral artery. Results for orders placed or performed during the hospital encounter of 19   2D ECHO COMPLETE ADULT (TTE) [address]  [address] [facility], [address] [facility]  [phone]    Transthoracic Echocardiogram  2D, M-mode, Doppler, and Color Doppler    Patient: Ayleen Bloom  MR #: [id number]  : 1975  Age: 53 years  Gender: Male  Study date: 12-Dec-2019  Account #: [de-identified]  Height: 66 in  Weight: 209.7 lb  BSA: 2.04 mï¾²  Status:Routine  Location: ER03  BP: 133/ 76    Allergies: NO KNOWN ALLERGENS    Sonographer:  LUZ Brewer  Group:  [id number] Cardiology  Referring Physician:  Blanca Gaston. Bella Garcia MD  Reading Physician:  Freddy Coco Shari Peabody, MD [facility] [place]    INDICATIONS: CVA    PROCEDURE: This was a routine study. A transthoracic echocardiogram was  performed. The study included complete 2D imaging, M-mode, complete spectral  Doppler, and color Doppler. Intravenous contrast (Definity) was administered. Image quality was adequate. LEFT VENTRICLE: Size was normal. Systolic function was normal. Ejection  fraction was estimated in the range of 65 % to 70 %. There were no regional  wall motion abnormalities. There was mild concentric hypertrophy. Left  ventricular diastolic function parameters were normal. Avg E/e': 11.05.    RIGHT VENTRICLE: The size was normal. Systolic function was normal. The  tricuspid jet envelope definition was inadequate for estimation of RV   systolic  pressure. LEFT ATRIUM: Size was normal.    ATRIAL SEPTUM: Agitated saline contrast injection (bubble study) was   performed. There was a right-to-left shunt, in the baseline state. RIGHT ATRIUM: Size was normal.    SYSTEMIC VEINS: IVC: The inferior vena cava was normal in size and course. The  respirophasic change in diameter was more than 50%. AORTIC VALVE: The valve was trileaflet. Leaflets exhibited mild sclerosis.   There was no evidence for stenosis. There was no insufficiency. MITRAL VALVE: Valve structure was normal. There was no evidence for stenosis. There was no regurgitation. TRICUSPID VALVE: The valve structure was normal. There was no evidence for  stenosis. There was trivial regurgitation. PULMONIC VALVE: Not well visualized. There was no evidence for stenosis. There  was no insufficiency. PERICARDIUM: There was no pericardial effusion. AORTA: The root exhibited normal size. SUMMARY:    -  Left ventricle: Systolic function was normal. Ejection fraction was  estimated in the range of 65 % to 70 %. There were no regional wall motion  abnormalities. There was mild concentric hypertrophy. -  Atrial septum: Agitated saline contrast injection (bubble study) was  performed. There was a right-to-left shunt, in the baseline state. -  Inferior vena cava, hepatic veins: The respirophasic change in diameter   was  more than 50%. SYSTEM MEASUREMENT TABLES    2D mode  AoR Diam (2D): 2.8 cm  LA Dimension (2D): 3.5 cm  Left Atrium Systolic Volume Index; Method of Disks, Biplane; 2D mode;: 26   ml/m2  IVS/LVPW (2D): 1  IVSd (2D): 1.2 cm  LVIDd (2D): 5.2 cm  LVIDs (2D): 2.9 cm  LVOT Area (2D): 2.3 cm2  LVPWd (2D): 1.2 cm  RVIDd (2D): 2.2 cm    Tissue Doppler Imaging  LV Peak Early Thomas Tissue Rc; Lateral MA (TDI): 8.1 cm/s  LV Peak Early Thomas Tissue Rc; Medial MA (TDI): 7.6 cm/s    Unspecified Scan Mode  Peak Grad; Mean; Antegrade Flow: 14 mm[Hg]  Vmax; Antegrade Flow: 185 cm/s  LVOT Diam: 1.7 cm  MV Peak Rc/LV Peak Tissue Rc E-Wave; Lateral MA: 10.7  MV Peak Rc/LV Peak Tissue Rc E-Wave; Medial MA: 11.4    Prepared and signed by    Ansley Roy MD [facility] [place]  Signed 12-Dec-2019 15:46:58             Assessment and Plan:     Hospital Problems as of 12/13/2019 Date Reviewed: 3/3/2017          Codes Class Noted - Resolved POA    * (Principal) Acute ischemic stroke ([facility] [address] ICD-10-CM: I63.9  ICD-9-CM: 434.91  12/12/2019 - Present Yes        Hypertension (Chronic) ICD-10-CM: I10  ICD-9-CM: 401.9  Unknown - Present Yes        Diabetes ([facility] [address] (Chronic) ICD-10-CM: E11.9  ICD-9-CM: 250.00  Unknown - Present Yes              Plan:  CVA  -neuro following, appreciate  -has multiple risk factors for stroke  -echo with R->L shunt even in resting state; check BLE US  -Neuro checks q4h  -cont ASA  -statin ordered; increase lipitor to 80mg given his .   -PT/OT  -passed swallow eval by SLP  -Cardiac telemetry  -smoking cessation education ordered    HTN  -Permissive HTN period over. Resume home meds today. PRN hydralazine SBP >160    DM2  -ISS for now, near goal  -a1c 8.4%    Discharge planning:    -CM consulted.   PPD in case of placement but appears he is self pay so placement likely not an option    DVT ppx: SCDs    Signed:  Toshia Cope MD

## 2019-12-13 NOTE — CONSULTS
Physical Medicine & Rehabilitation Note-consult    Patient: Fernanda Vieyra MRN: [id number]  SSN: [id number]    YOB: 1975  Age: 53 y.o. Sex: male      Admit Date: 12/12/2019  Admitting Physician: Daphne Fabian MD    Medical Decision Making/Plan/Recommend:   Acute CVA/ dense left hemiparesis. Significant functional deficits, mobility, gait impairment due to left sided weakness, impaired balance. Anticipate prolonged rehab course. Continue acute PT, OT to focus on left sided neuro deficits. Continue gait training, transfer training, balance activities, WC mobility at acute floor. I will continue to follow therapy progress to determine best rehab course. Thank you for the opportunity to participate in the care of this patient. Chief Complaint : Gait dysfunction secondary to below. Admit Diagnosis: Acute ischemic stroke ([address] [I63.9]; Cerebrovascular accident (CVA) due to embolism ([address] [I63.9]  Acute CVA  left  hemiparesis  weakness  Pain  DM2  hypertension  DVT risk  Acute Rehab Dx:  Dysarthria  Dysphagia  Left hemiparesis  Mobility and ambulation deficits  Self Care/ADL deficits    Medical Dx:  Past Medical History:   Diagnosis Date    Acute ischemic stroke ([address] 12/12/2019    Acute pancreatitis 11/19/2014    Cerebrovascular accident (CVA) due to embolism ([address] 12/12/2019    Diabetes ([address] 2002    Diabetes ([address]     Diabetes mellitus     Hypertension      Subjective:     Date of Evaluation:  December 13, 2019    HPI: Fernanda Vieyra is a 53 y.o. male patient at [address] [place] [address] who was admitted on 12/12/2019  by Daphne Fabian MD with below mentioned medical history ,is being seen for Physical Medicine and Rehabilitation consult. Fernanda Vieyra presented with acute onset left facial droop, dysarthria, left leg and arm weakness.  MRI showed   acute to early subacute infarcts in the left cerebellar hemisphere, in the periventricular deep left frontoparietal white matter and likely in the right paramedian yariel. MRI also showed old lacunar infarcts in the corpus striatum and periventricular white matter as well as within the left corona radiata. Patient was admitted with diagnosis of acute CVA and started on medical management for acute stroke. Patient has elevated BP, managed closely. Acute PT, OT and ST evaluating and starting to mobilize the patient. On exam patient remains essentially plegic on the left side. Sensation appears intact, except mildly deminished on left to temperature. Patient requires moderate assist of 2 for mobility, transfers and ambulation. managing his gait with minimal assist.      Yo Lind is seen and examined today. Medical Records reviewed. At baseline patient reports independence with all activities. Current Level of Function:   bed mobility - mod  Ax2, transfers - modAx2, decreased balance , ambulation - 5' with RW and mod A x 2. Prior Level of Function/Work/Activity:  Independent, lives with wife in 2nd story 1 level apartment. No recent falls.       Family History   Problem Relation Age of Onset    Diabetes Mother     Diabetes Father       Social History     Tobacco Use    Smoking status: Current Every Day Smoker     Packs/day: 0.25     Types: Cigarettes    Smokeless tobacco: Former User     Types: Chew   Substance Use Topics    Alcohol use: No     Past Surgical History:   Procedure Laterality Date    HX HERNIA REPAIR      HX ORTHOPAEDIC      carpal tunnel surgery      Prior to Admission medications    Medication Sig Start Date End Date Taking? Authorizing Provider   lisinopril (PRINIVIL, ZESTRIL) 40 mg tablet Take 1 Tab by mouth daily. 6/19/19  Yes Miguel Presume, DO   omeprazole (PRILOSEC) 20 mg capsule Take 1 Cap by mouth daily. 1/26/19  Yes Candie Garza MD   insulin NPH/insulin regular (NOVOLIN 70/30, HUMULIN 70/30) 100 unit/mL (70-30) injection by SubCUTAneous route.    Yes Other, Phys, MD   Insulin Syringe-Needle U-100 1/2 mL 31 x 5/16\" syrg  2/9/15  Yes Other, MD Aidan     No Known Allergies     Review of Systems: + left hemiparesis. Denies chest pain, shortness of breath, cough, headache, visual problems, abdominal pain, dysurea, calf pain. Pertinent positives are as noted in the medical records and unremarkable otherwise. Objective:     Vitals:  Blood pressure (!) 180/96, pulse 99, temperature 98 °F (36.7 °C), resp. rate 18, height 5' 6\" (1.676 m), weight 210 lb (95.3 kg), SpO2 100 %. Temp (24hrs), Av.1 °F (36.7 °C), Min:97.8 °F (36.6 °C), Max:98.4 °F (36.9 °C)    BMI (calculated): 33.9 (19 0906)   Intake and Output:   1901 -  0700  In: -   Out: 550 [Urine:550]    Physical Exam:   General: Alert and age appropriately oriented. No acute cardio respiratory distress. HEENT: Normocephalic,no scleral icterus  Oral mucosa moist without cyanosis, No bruit, No JVD. Lungs: Clear to auscultation  bilaterally. Respiration even and unlabored   Heart: Regular rate and rhythm, S1, S2   No  murmurs, clicks, rub or gallops   Abdomen: Soft, non-tender, nondistended. Bowel sounds present. No organomegaly. Genitourinary: defer   Neuromuscular:      PERRL, EOMI  + dysarthria. Follows simple commands consistently. Able to identify, recall repeat. LUE     Shoulder abduction  0 /5              Elbow flexion:  0 /5               Wrist extension:  0 /5              Finger flexion;   0/5  RUE    Shoulder abduction: 5 /5                Elbow flexion:   5/5    Wrist extension:5/5   Finger flexion:  5/5  LLE     Hip flexion:  0 /5              Knee extension:  0 /5    Ankle dorsiflexion: 0  /5   Ankle plantarflexion:  0 /5        RLE     Hip flexion:  5 /5   Knee extension:  5 /5    Ankle dorsiflexion:  5 /5   Ankle plantarflexion:   5/5  Sensory - intact, mildly decreased L to temp. No atrophy, no fasciculations, no tremors. Skin/extremity: No rashes, no erythema.  Calf non tender BLE.                                                                                        Labs/Studies:  Recent Results (from the past 72 hour(s))   GLUCOSE, POC    Collection Time: 12/12/19  9:04 AM   Result Value Ref Range    Glucose (POC) 146 (H) 65 - 319 mg/dL   METABOLIC PANEL, COMPREHENSIVE    Collection Time: 12/12/19  9:06 AM   Result Value Ref Range    Sodium 143 136 - 145 mmol/L    Potassium 3.0 (L) 3.5 - 5.1 mmol/L    Chloride 108 (H) 98 - 107 mmol/L    CO2 29 21 - 32 mmol/L    Anion gap 6 (L) 7 - 16 mmol/L    Glucose 144 (H) 65 - 100 mg/dL    BUN 12 6 - 23 MG/DL    Creatinine 1.46 0.8 - 1.5 MG/DL    GFR est AA >60 >60 ml/min/1.73m2    GFR est non-AA 55 (L) >60 ml/min/1.73m2    Calcium 8.9 8.3 - 10.4 MG/DL    Bilirubin, total 0.3 0.2 - 1.1 MG/DL    ALT (SGPT) 24 12 - 65 U/L    AST (SGOT) 28 15 - 37 U/L    Alk. phosphatase 120 50 - 136 U/L    Protein, total 6.5 6.3 - 8.2 g/dL    Albumin 2.4 (L) 3.5 - 5.0 g/dL    Globulin 4.1 (H) 2.3 - 3.5 g/dL    A-G Ratio 0.6 (L) 1.2 - 3.5     PROTHROMBIN TIME + INR    Collection Time: 12/12/19  9:06 AM   Result Value Ref Range    Prothrombin time 10.9 (L) 11.7 - 14.5 sec    INR 0.8     CBC WITH AUTOMATED DIFF    Collection Time: 12/12/19  9:08 AM   Result Value Ref Range    WBC 6.0 4.3 - 11.1 K/uL    RBC 4.93 4.23 - 5.6 M/uL    HGB 12.5 (L) 13.6 - 17.2 g/dL    HCT 40.5 (L) 41.1 - 50.3 %    MCV 82.2 79.6 - 97.8 FL    MCH 25.4 (L) 26.1 - 32.9 PG    MCHC 30.9 (L) 31.4 - 35.0 g/dL    RDW 18.6 (H) 11.9 - 14.6 %    PLATELET 819 623 - 266 K/uL    MPV 11.7 9.4 - 12.3 FL    ABSOLUTE NRBC 0.00 0.0 - 0.2 K/uL    DF AUTOMATED      NEUTROPHILS 68 43 - 78 %    LYMPHOCYTES 25 13 - 44 %    MONOCYTES 6 4.0 - 12.0 %    EOSINOPHILS 1 0.5 - 7.8 %    BASOPHILS 1 0.0 - 2.0 %    IMMATURE GRANULOCYTES 0 0.0 - 5.0 %    ABS. NEUTROPHILS 4.1 1.7 - 8.2 K/UL    ABS. LYMPHOCYTES 1.5 0.5 - 4.6 K/UL    ABS. MONOCYTES 0.3 0.1 - 1.3 K/UL    ABS. EOSINOPHILS 0.0 0.0 - 0.8 K/UL    ABS.  BASOPHILS 0.1 0.0 - 0.2 K/UL    ABS. IMM. GRANS. 0.0 0.0 - 0.5 K/UL   POC TROPONIN-I    Collection Time: 12/12/19  9:10 AM   Result Value Ref Range    Troponin-I (POC) 0.03 0.02 - 0.05 ng/ml   EKG, 12 LEAD, INITIAL    Collection Time: 12/12/19 10:27 AM   Result Value Ref Range    Ventricular Rate 90 BPM    Atrial Rate 90 BPM    P-R Interval 144 ms    QRS Duration 86 ms    Q-T Interval 418 ms    QTC Calculation (Bezet) 511 ms    Calculated P Axis 61 degrees    Calculated R Axis 42 degrees    Calculated T Axis 2 degrees    Diagnosis       !! AGE AND GENDER SPECIFIC ECG ANALYSIS !!   Normal sinus rhythm  Nonspecific T wave abnormality  Prolonged QT  Abnormal ECG  When compared with ECG of 31-OCT-2016 13:45,  Nonspecific T wave abnormality, worse in Inferior leads  Nonspecific T wave abnormality, worse in Lateral leads  QT has lengthened  Confirmed by Jorge Ferguson MD (), QUIN CLEMENTS ([id number]) on 12/12/2019 1:14:23 PM     GLUCOSE, POC    Collection Time: 12/12/19  2:38 PM   Result Value Ref Range    Glucose (POC) 118 (H) 65 - 100 mg/dL   GLUCOSE, POC    Collection Time: 12/12/19  5:01 PM   Result Value Ref Range    Glucose (POC) 150 (H) 65 - 100 mg/dL   GLUCOSE, POC    Collection Time: 12/12/19  9:39 PM   Result Value Ref Range    Glucose (POC) 180 (H) 65 - 100 mg/dL   CBC W/O DIFF    Collection Time: 12/13/19  4:47 AM   Result Value Ref Range    WBC 6.6 4.3 - 11.1 K/uL    RBC 4.48 4.23 - 5.6 M/uL    HGB 11.2 (L) 13.6 - 17.2 g/dL    HCT 35.7 (L) 41.1 - 50.3 %    MCV 79.7 79.6 - 97.8 FL    MCH 25.0 (L) 26.1 - 32.9 PG    MCHC 31.4 31.4 - 35.0 g/dL    RDW 18.6 (H) 11.9 - 14.6 %    PLATELET 203 691 - 198 K/uL    MPV 10.1 9.4 - 12.3 FL    ABSOLUTE NRBC 0.00 0.0 - 0.2 K/uL   LIPID PANEL    Collection Time: 12/13/19  4:47 AM   Result Value Ref Range    LIPID PROFILE          Cholesterol, total 354 (H) <200 MG/DL    Triglyceride 420 (H) 35 - 150 MG/DL    HDL Cholesterol 54 40 - 60 MG/DL    LDL, calculated Not calculated due to elevated triglyceride level <100 MG/DL    VLDL, calculated 84 (H) 6.0 - 23.0 MG/DL    CHOL/HDL Ratio 6.6     HEMOGLOBIN A1C WITH EAG    Collection Time: 12/13/19  4:47 AM   Result Value Ref Range    Hemoglobin A1c 8.4 (H) 4.8 - 6.0 %    Est. average glucose 564 mg/dL   METABOLIC PANEL, BASIC    Collection Time: 12/13/19  4:47 AM   Result Value Ref Range    Sodium 145 136 - 145 mmol/L    Potassium 3.1 (L) 3.5 - 5.1 mmol/L    Chloride 110 (H) 98 - 107 mmol/L    CO2 30 21 - 32 mmol/L    Anion gap 5 (L) 7 - 16 mmol/L    Glucose 143 (H) 65 - 100 mg/dL    BUN 9 6 - 23 MG/DL    Creatinine 1.06 0.8 - 1.5 MG/DL    GFR est AA >60 >60 ml/min/1.73m2    GFR est non-AA >60 >60 ml/min/1.73m2    Calcium 8.3 8.3 - 10.4 MG/DL   MAGNESIUM    Collection Time: 12/13/19  4:47 AM   Result Value Ref Range    Magnesium 1.6 (L) 1.8 - 2.4 mg/dL   LDL, DIRECT    Collection Time: 12/13/19  4:47 AM   Result Value Ref Range    LDL,Direct 231 (H) <100 mg/dl   GLUCOSE, POC    Collection Time: 12/13/19  6:58 AM   Result Value Ref Range    Glucose (POC) 146 (H) 65 - 100 mg/dL   GLUCOSE, POC    Collection Time: 12/13/19 10:41 AM   Result Value Ref Range    Glucose (POC) 267 (H) 65 - 100 mg/dL       Functional Assessment:  Reviewed participation and progress in therapies  Gross Assessment  AROM: Generally decreased, functional(absent LLE and LUE) (12/13/19 0852)  Strength: Generally decreased, functional(4/5 RLE, 0 to 1/5 LLE) (12/13/19 1804)  Sensation: Intact (12/13/19 0852)   Bed Mobility  Supine to Sit: Moderate assistance (12/13/19 0852)  Sit to Supine: Moderate assistance (12/13/19 3392)  Scooting:  Moderate assistance (12/13/19 4004)   Balance  Sitting: Impaired (12/13/19 0852)  Sitting - Static: Fair (occasional) (12/13/19 0852)  Sitting - Dynamic: Poor (constant support) (12/13/19 0852)  Standing: Impaired (12/13/19 0852)  Standing - Static: Poor (12/13/19 0852)  Standing - Dynamic : Poor (12/13/19 0852)               Bed/Mat Mobility  Supine to Sit: Moderate assistance (12/13/19 6832)  Sit to Supine: Moderate assistance (12/13/19 0852)  Sit to Stand: Moderate assistance;Assist x2 (12/13/19 0852)  Stand to Sit: Moderate assistance;Assist x2 (12/13/19 0852)  Bed to Chair: Moderate assistance;Assist x2 (12/13/19 2098)  Scooting: Moderate assistance (12/13/19 4452)     Ambulation:  Gait  Base of Support: Center of gravity altered;Narrowed; Shift to right (12/13/19 9467)  Speed/Clotilde: Delayed; Shuffled; Slow (12/13/19 7101)  Step Length: Left shortened;Right shortened (12/13/19 3214)  Gait Abnormalities: Decreased step clearance; Hemiplegic;Trunk sway increased (12/13/19 0326)  Ambulation - Level of Assistance:  Moderate assistance;Assist x2 (12/13/19 0852)  Distance (ft): 5 Feet (ft)(side steps edge of bed) (12/13/19 0852)  Assistive Device: Willian Easton, rolling;Gait belt (12/13/19 2108)    Impression/Plan:     Principal Problem:    Acute ischemic stroke ([facility] (12/12/2019)    Active Problems:    Hypertension ()      Diabetes (HCC) ()        Current Facility-Administered Medications   Medication Dose Route Frequency Provider Last Rate Last Dose    hydrALAZINE (APRESOLINE) 20 mg/mL injection 20 mg  20 mg IntraVENous Q4H PRN Richard Gamez MD   20 mg at 12/13/19 1002    atorvastatin (LIPITOR) tablet 80 mg  80 mg Oral QHS Richard Gamez MD        amLODIPine (NORVASC) tablet 10 mg  10 mg Oral DAILY Richard Gamez MD   10 mg at 12/13/19 1238    cloNIDine HCl (CATAPRES) tablet 0.1 mg  0.1 mg Oral Q4H PRN Richard Gamez MD   0.1 mg at 12/13/19 1238    lisinopril (PRINIVIL, ZESTRIL) tablet 40 mg  40 mg Oral DAILY Richard Gamez MD   40 mg at 12/13/19 0900    tuberculin injection 5 Units  5 Units IntraDERMal ONCE Richard Gamez MD   5 Units at 12/12/19 1802    sodium chloride (NS) flush 5-40 mL  5-40 mL IntraVENous Q8H Richard Gamez MD   10 mL at 12/13/19 1317    sodium chloride (NS) flush 5-40 mL  5-40 mL IntraVENous PRN Aris Pascual, Willy Wood MD        ondansetron [facility]) injection 4 mg  4 mg IntraVENous Q4H PRN Sarbjit Hernandez MD        aspirin chewable tablet 81 mg  81 mg Oral DAILY Sarbjit Hernandez MD   81 mg at 12/13/19 6298    acetaminophen (TYLENOL) tablet 650 mg  650 mg Oral Q4H PRN Sarbjit Hernandez MD   650 mg at 12/12/19 1950    polyethylene glycol (MIRALAX) packet 17 g  17 g Oral DAILY PRN Sarbjit Hernandez MD        enoxaparin (LOVENOX) injection 40 mg  40 mg SubCUTAneous Q24H Sarbjit Hernandez MD   40 mg at 12/13/19 1317    insulin lispro (HUMALOG) injection   SubCUTAneous AC&HS Sarbjit Hernandez MD   6 Units at 12/13/19 1238    dextrose 40% (GLUTOSE) oral gel 1 Tube  15 g Oral PRN Sarbjit Hernandez MD        glucagon [facility]) injection 1 mg  1 mg IntraMUSCular PRN Sarbjit Hernandez MD        dextrose (D50W) injection syrg 12.5-25 g  25-50 mL IntraVENous PRN Sarbjit Hernandez MD           Signed By: Johnathon You MD     December 13, 2019

## 2019-12-13 NOTE — PROGRESS NOTES
Spiritual Care Visit, initial visit. Visited with patient at bedside. Prayed for patient's healing and health. Visit by Jackie Dutta, Staff .  Gaudencio., Th.B., B.A.

## 2019-12-13 NOTE — PROGRESS NOTES
12/13/19 0755   NIH Stroke Scale   Interval Other (comment)  (With Annette Cameron RN )   LOC 0   LOC Questions 0   LOC Commands 0   Best Gaze 0   Visual 0   Facial Palsy 2   Motor Right Arm 0   Motor Left Arm 3   Motor Right Leg 0   Motor Left Leg 4   Limb Ataxia 0   Sensory 0   Best Language 0   Dysarthria 1   Extinction and Inattention 0   Total 10

## 2019-12-13 NOTE — PROGRESS NOTES
OT evaluation attempted again; pt with other provider. Will follow up as schedule allows.     Araceli Cook, OT

## 2019-12-13 NOTE — PROGRESS NOTES
Problem: Patient Education: Go to Patient Education Activity  Goal: Patient/Family Education  Outcome: Progressing Towards Goal     Problem: Falls - Risk of  Goal: *Absence of Falls  Description  Document Donata Carrel Fall Risk and appropriate interventions in the flowsheet.   Outcome: Progressing Towards Goal  Note: Fall Risk Interventions:  Mobility Interventions: Assess mobility with egress test, Communicate number of staff needed for ambulation/transfer    Mentation Interventions: Adequate sleep, hydration, pain control    Medication Interventions: Bed/chair exit alarm, Evaluate medications/consider consulting pharmacy    Elimination Interventions: Call light in reach, Toileting schedule/hourly rounds    History of Falls Interventions: Consult care management for discharge planning, Door open when patient unattended

## 2019-12-13 NOTE — PROGRESS NOTES
Problem: Dysphagia (Adult)  Goal: *Acute Goals and Plan of Care (Insert Text)  Description  LTG: Patient will tolerate least restrictive diet without overt signs or symptoms of airway compromise. STG: Patient will tolerate puree diet and thin liquids (no mixed consistencies, no straws) without overt signs or symptoms of airway compromise. Edited 12/13/19  STG: Patient will participate in chewable trials to determine ability to tolerate diet advancement. STG: Patient will participate in modified barium swallow study as clinically indicated. Outcome: Progressing Towards Goal     STG: Patient will participate in speech/language/cognitive evaluation     SPEECH LANGUAGE PATHOLOGY: DYSPHAGIA- Daily Note 1    NAME/AGE/GENDER: Calin Yen is a 53 y.o. male  DATE: 12/13/2019  PRIMARY DIAGNOSIS: Acute ischemic stroke [facility]) [I63.9]  Cerebrovascular accident (CVA) due to embolism ([facility] [I63.9]      ICD-10: Treatment Diagnosis: R13.12 Dysphagia, Oropharyngeal Phase    INTERDISCIPLINARY COLLABORATION: Registered Nurse  PRECAUTIONS/ALLERGIES: Patient has no known allergies. SUBJECTIVE   Upright in bed. Wife at bedside. Speech moderately dysarthric. 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 Mild cough at baseline. Orientation:   Person  Place  Time  Situation    Pain: Pain Scale 1: Numeric (0 - 10)  Pain Intensity 1: 6  Pain Location 1: Leg  Pain Intervention(s) 1: Nurse notified     OBJECTIVE   Patient seen for po trials and diet tolerance. Significant left sided facial droop. Consumed single and serial gulps thin liquids via cup and straw, puree, mechanical soft(softened wagner cracker), mixed consistency, and coarse solid. Overt s/sx airway compromise, coughing, with serial sips of thin liquids by straw. No overt s/sx airway compromise and clear vocal quality with single and serial gulps via cup.  Slowed bolus manipulation and mild diffuse oral residue with chewables requiring liquid rinse x2 to effectively clear. Coughing during mastication with mixed consistency. ASSESSMENT   Patient presents with mild oropharyngeal dysphagia. Overt s/sx airway compromise with thin liquid by straws and mixed consistency. Oral dysphagia with chewables due to limited dentition and left sided facial weakness. Recommend puree diet and thin liquids by cup only. NO STRAWS, NO MIXED CONSISTENCIES. Float medications in puree. Will follow up for ongoing trials for diet advancement when deemed appropriate. Patient may benefit from modified barium swallow study in future if symptoms persist.   Patient will benefit from full speech/cognitive linguistic evaluation, however drowsy at this time. Tool Used: Dysphagia Outcome and Severity Scale (ROCHELLE)    Score Comments   Normal Diet  [] 7 With no strategies or extra time needed   Functional Swallow  [] 6 May have mild oral or pharyngeal delay   Mild Dysphagia  [] 5 Which may require one diet consistency restricted    Mild-Moderate Dysphagia  [] 4 With 1-2 diet consistencies restricted   Moderate Dysphagia  [] 3 With 2 or more diet consistencies restricted   Moderate-Severe Dysphagia  [] 2 With partial PO strategies (trials with ST only)   Severe Dysphagia  [] 1 With inability to tolerate any PO safely      Score:  Initial: 4 Most Recent:  (Date 12/13/19 )   Interpretation of Tool: The Dysphagia Outcome and Severity Scale (ROCHELLE) is a simple, easy-to-use, 7-point scale developed to systematically rate the functional severity of dysphagia based on objective assessment and make recommendations for diet level, independence level, and type of nutrition. PLAN    FREQUENCY/DURATION: Continue to follow patient 3 times a week for duration of hospital stay to address above goals.     - Recommendations for next treatment session: Next treatment will address diet tolerance, po trials, speech/cognitive linguistic evaluation     REHABILITATION POTENTIAL FOR STATED GOALS: Excellent     COMPLIANCE WITH PROGRAM/EXERCISES: Will assess as treatment progresses    CONTINUATION OF SKILLED SERVICES/MEDICAL NECESSITY:   Patient is expected to demonstrate progress in  swallow strength, swallow timeliness, swallow function, diet tolerance and swallow safety in order to  improve swallow safety, work toward diet advancement and decrease aspiration risk.  Patient continues to require skilled intervention due to dysphagia and further assessment warranted. RECOMMENDATIONS   DIET:    PO:  Pureed   Liquids:  regular thin via cup only    MEDICATIONS: Whole in puree     ASPIRATION PRECAUTIONS  · Slow rate of intake  · Small bites/sips  · Upright at 90 degrees during meal     COMPENSATORY STRATEGIES/MODIFICATIONS  · Cup/sip  · Small sips and bites  · No straws  · Alternate solids/liquids      EDUCATION:  · Recommendations discussed with Nursing  · Family  · Patient     RECOMMENDATIONS for CONTINUED SPEECH THERAPY: YES: Anticipate need for ongoing speech therapy during this hospitalization and at next level of care.          SAFETY:  After treatment position/precautions:  · Upright in bed  · RN notified  · Call light within reach   · Wife at bedside     Total Treatment Duration:   Time In: 1009  Time Out: [address] [address] [facility] [place], [facility]

## 2019-12-13 NOTE — PROGRESS NOTES
CM acknowledged order for case management consult secondary to diagnosis. Met with patient and wife in room. Patient is alert and laying in bed. Slurred speech noted. Patient lives with spouse in apartment with 12 stairs to enter. Prior to this admission patient was independent in ADLs, has no DME and does not drive. Patient and spouse educated on STR/IRC and [facility] services. [facility] is following. Dr. Fidelina Crouch has seen patient and will continue to follow. Verified demographics, self-pay status and PCP. Patient is unemployed. PT/OT following. CM will continue to follow for discharge planning. PPD has been ordered for potential STR however patient is self pay at this time. Care Management Interventions  PCP Verified by CM:  Yes  Transition of Care Consult (CM Consult): Discharge Planning, Other(IRC Following)  Physical Therapy Consult: Yes  Occupational Therapy Consult: Yes  Speech Therapy Consult: Yes  Current Support Network: Own Home, Lives with Spouse  Confirm Follow Up Transport: Cab  Plan discussed with Pt/Family/Caregiver: Yes  Discharge Location  Discharge Placement: Unable to determine at this time

## 2019-12-13 NOTE — PROGRESS NOTES
SPEECH PATHOLOGY NOTE:    Attempted to see patient, however with another staff member. Will check back at later time/date.        Kenyetta Orozco, [facility], [facility], CCC-SLP

## 2019-12-13 NOTE — PROGRESS NOTES
12/12/19 1910   NIH Stroke Scale   Interval Other (comment)   LOC 0   LOC Questions 0   LOC Commands 0   Best Gaze 0   Visual 0   Facial Palsy 2   Motor Right Arm 0   Motor Left Arm 3   Motor Right Leg 0   Motor Left Leg 4   Limb Ataxia 0   Sensory 0   Best Language 0   Dysarthria 1   Extinction and Inattention 0   Total 10

## 2019-12-14 LAB
GLUCOSE BLD STRIP.AUTO-MCNC: 143 MG/DL (ref 65–100)
GLUCOSE BLD STRIP.AUTO-MCNC: 192 MG/DL (ref 65–100)
GLUCOSE BLD STRIP.AUTO-MCNC: 207 MG/DL (ref 65–100)
GLUCOSE BLD STRIP.AUTO-MCNC: 219 MG/DL (ref 65–100)
MM INDURATION POC: 0 MM (ref 0–5)
PPD POC: NEGATIVE NEGATIVE

## 2019-12-14 PROCEDURE — 74011250636 HC RX REV CODE- 250/636: Performed by: INTERNAL MEDICINE

## 2019-12-14 PROCEDURE — 99232 SBSQ HOSP IP/OBS MODERATE 35: CPT | Performed by: PSYCHIATRY & NEUROLOGY

## 2019-12-14 PROCEDURE — 74011250637 HC RX REV CODE- 250/637: Performed by: INTERNAL MEDICINE

## 2019-12-14 PROCEDURE — 74011636637 HC RX REV CODE- 636/637: Performed by: INTERNAL MEDICINE

## 2019-12-14 PROCEDURE — 65660000000 HC RM CCU STEPDOWN

## 2019-12-14 PROCEDURE — 74011250637 HC RX REV CODE- 250/637: Performed by: HOSPITALIST

## 2019-12-14 PROCEDURE — 82962 GLUCOSE BLOOD TEST: CPT

## 2019-12-14 RX ORDER — POTASSIUM CHLORIDE 20 MEQ/1
40 TABLET, EXTENDED RELEASE ORAL
Status: COMPLETED | OUTPATIENT
Start: 2019-12-14 | End: 2019-12-14

## 2019-12-14 RX ORDER — METOPROLOL TARTRATE 25 MG/1
25 TABLET, FILM COATED ORAL 2 TIMES DAILY
Status: DISCONTINUED | OUTPATIENT
Start: 2019-12-14 | End: 2019-12-15

## 2019-12-14 RX ORDER — LANOLIN ALCOHOL/MO/W.PET/CERES
400 CREAM (GRAM) TOPICAL 2 TIMES DAILY
Status: COMPLETED | OUTPATIENT
Start: 2019-12-14 | End: 2019-12-20

## 2019-12-14 RX ADMIN — METOPROLOL TARTRATE 25 MG: 25 TABLET ORAL at 12:00

## 2019-12-14 RX ADMIN — ACETAMINOPHEN 650 MG: 325 TABLET, FILM COATED ORAL at 18:02

## 2019-12-14 RX ADMIN — AMLODIPINE BESYLATE 10 MG: 10 TABLET ORAL at 09:06

## 2019-12-14 RX ADMIN — Medication 5 ML: at 05:13

## 2019-12-14 RX ADMIN — Medication 10 ML: at 13:44

## 2019-12-14 RX ADMIN — INSULIN LISPRO 4 UNITS: 100 INJECTION, SOLUTION INTRAVENOUS; SUBCUTANEOUS at 12:00

## 2019-12-14 RX ADMIN — POTASSIUM CHLORIDE 40 MEQ: 20 TABLET, EXTENDED RELEASE ORAL at 09:06

## 2019-12-14 RX ADMIN — ENOXAPARIN SODIUM 40 MG: 40 INJECTION SUBCUTANEOUS at 13:43

## 2019-12-14 RX ADMIN — ASPIRIN 81 MG 81 MG: 81 TABLET ORAL at 09:06

## 2019-12-14 RX ADMIN — METOPROLOL TARTRATE 25 MG: 25 TABLET ORAL at 17:09

## 2019-12-14 RX ADMIN — INSULIN LISPRO 2 UNITS: 100 INJECTION, SOLUTION INTRAVENOUS; SUBCUTANEOUS at 16:19

## 2019-12-14 RX ADMIN — LISINOPRIL 40 MG: 20 TABLET ORAL at 09:06

## 2019-12-14 RX ADMIN — CLONIDINE HYDROCHLORIDE 0.1 MG: 0.2 TABLET ORAL at 09:08

## 2019-12-14 RX ADMIN — Medication 20 ML: at 21:12

## 2019-12-14 RX ADMIN — HYDRALAZINE HYDROCHLORIDE 20 MG: 20 INJECTION, SOLUTION INTRAMUSCULAR; INTRAVENOUS at 04:45

## 2019-12-14 RX ADMIN — Medication 400 MG: at 17:09

## 2019-12-14 RX ADMIN — INSULIN LISPRO 4 UNITS: 100 INJECTION, SOLUTION INTRAVENOUS; SUBCUTANEOUS at 08:07

## 2019-12-14 RX ADMIN — Medication 400 MG: at 09:06

## 2019-12-14 RX ADMIN — ATORVASTATIN CALCIUM 80 MG: 80 TABLET, FILM COATED ORAL at 22:12

## 2019-12-14 NOTE — PROGRESS NOTES
Consult    Patient: Alanna Boss MRN: [id number]  SSN: [id number]    YOB: 1975  Age: 53 y.o. Sex: male        Assessment:   Acute ischemic stroke, multifocal anterior and posterior circulation implying proximal source of embolus    PFO, pathological/high risk versus incidental pending ISMAEL    Hypertension uncontrolled    Diabetes uncontrolled    Dyslipidemia with hypertriglyceridemia  Hospital Problems  Date Reviewed: 3/3/2017          Codes Class Noted POA    * (Principal) Acute ischemic stroke [facility]) ICD-10-CM: I63.9  ICD-9-CM: 434.91  12/12/2019 Yes        Hypertension (Chronic) ICD-10-CM: I10  ICD-9-CM: 401.9  Unknown Yes        Diabetes ([facility] [address] (Chronic) ICD-10-CM: E11.9  ICD-9-CM: 250.00  Unknown Yes              Plan:     · Continue ASA 81 mg daily   · Continue high intensity statin ; goal <70  · Recommend starting patient on Vascepa 2gm po BID as an outpatient given elevated triglycerides of 420  · Neurochecks Q4H  · Cardiology consult for ISMAEL to evaluate PFO for possible closure; if ISMAEL negative patient would need long term cardiac monitor for possible PAF. · Telemetry   · PT/OT/ST  · DVT prophylaxis   · BP management - normotensive, with long-term goal <130/80  · Smoking cessation if applicable   · Diabetes education A1C 8.4; goal <7.0  · Depression Screening prior to discharge    Subjective: Follow-up of stroke. Doing well this morning. Continued severe left-sided weakness. Wife at bedside with questions regarding  Alanna Boss is a 53 y.o. male who is being seen for TTE finding of PFO. Vira Gonzales     Past Medical History:   Diagnosis Date    Acute ischemic stroke ([address] 12/12/2019    Acute pancreatitis 11/19/2014    Cerebrovascular accident (CVA) due to embolism ([address] 12/12/2019    Diabetes ([address] 2002    Diabetes ([address]     Diabetes mellitus     Hypertension      Past Surgical History:   Procedure Laterality Date    HX HERNIA REPAIR      HX ORTHOPAEDIC      carpal tunnel surgery      Family History   Problem Relation Age of Onset    Diabetes Mother     Diabetes Father      Social History     Tobacco Use    Smoking status: Current Every Day Smoker     Packs/day: 0.25     Types: Cigarettes    Smokeless tobacco: Former User     Types: Chew   Substance Use Topics    Alcohol use: No      Current Facility-Administered Medications   Medication Dose Route Frequency Provider Last Rate Last Dose    magnesium oxide (MAG-OX) tablet 400 mg  400 mg Oral BID Berto Rayo MD   400 mg at 12/14/19 5897    metoprolol tartrate (LOPRESSOR) tablet 25 mg  25 mg Oral BID Berto Rayo MD        hydrALAZINE (APRESOLINE) 20 mg/mL injection 20 mg  20 mg IntraVENous Q4H PRN Truett Push, MD   20 mg at 12/14/19 0445    atorvastatin (LIPITOR) tablet 80 mg  80 mg Oral QHS Truett Push, MD   80 mg at 12/13/19 2145    amLODIPine (NORVASC) tablet 10 mg  10 mg Oral DAILY Truett Push, MD   10 mg at 12/14/19 4926    cloNIDine HCl (CATAPRES) tablet 0.1 mg  0.1 mg Oral Q4H PRN Truett Push, MD   0.1 mg at 12/14/19 0908    lisinopril (PRINIVIL, ZESTRIL) tablet 40 mg  40 mg Oral DAILY Truett Push, MD   40 mg at 12/14/19 0906    sodium chloride (NS) flush 5-40 mL  5-40 mL IntraVENous Adine Jess Coughlin MD   5 mL at 12/14/19 0513    sodium chloride (NS) flush 5-40 mL  5-40 mL IntraVENous PRN Truelakia Push, MD        ondansetron [facility]) injection 4 mg  4 mg IntraVENous Q4H PRN Truett Push, MD        aspirin chewable tablet 81 mg  81 mg Oral DAILY Truett Push, MD   81 mg at 12/14/19 1706    acetaminophen (TYLENOL) tablet 650 mg  650 mg Oral Q4H PRN Truett Push, MD   650 mg at 12/13/19 1818    polyethylene glycol (MIRALAX) packet 17 g  17 g Oral DAILY PRN Truett Push, MD        enoxaparin (LOVENOX) injection 40 mg  40 mg SubCUTAneous Q24H Truett Push, MD   40 mg at 12/13/19 1317    insulin lispro (HUMALOG) injection   SubCUTAneous AC&HS Ying Mckinney MD   4 Units at 12/14/19 6330    dextrose 40% (GLUTOSE) oral gel 1 Tube  15 g Oral PRN Ying Mckinney MD        glucagon [facility]) injection 1 mg  1 mg IntraMUSCular PRN Ying Mckinney MD        dextrose (D50W) injection syrg 12.5-25 g  25-50 mL IntraVENous PRN Ying Mckinney MD            No Known Allergies    Review of Systems:      Objective:     Vitals:    12/13/19 2000 12/14/19 0000 12/14/19 0400 12/14/19 0718   BP: 172/86 170/86 (!) 161/97 165/87   Pulse: 84 83 96 96   Resp: 17 17 17 17   Temp: 98.1 °F (36.7 °C) 98.4 °F (36.9 °C) 98 °F (36.7 °C) 98.4 °F (36.9 °C)   SpO2: 94% 94% 96% 98%   Weight:       Height:            Physical Exam:          Severe dysarthria    Left central facial weakness    Motor weakness left upper and lower extremity  Recent Results (from the past 24 hour(s))   GLUCOSE, POC    Collection Time: 12/13/19  3:58 PM   Result Value Ref Range    Glucose (POC) 139 (H) 65 - 100 mg/dL   PLEASE READ & DOCUMENT PPD TEST IN 24 HRS    Collection Time: 12/13/19  5:32 PM   Result Value Ref Range    PPD Negative Negative    mm Induration 0 0 - 5 mm   GLUCOSE, POC    Collection Time: 12/13/19  9:32 PM   Result Value Ref Range    Glucose (POC) 178 (H) 65 - 100 mg/dL   GLUCOSE, POC    Collection Time: 12/14/19  7:14 AM   Result Value Ref Range    Glucose (POC) 219 (H) 65 - 100 mg/dL       Lab Results   Component Value Date/Time    Cholesterol, total 354 (H) 12/13/2019 04:47 AM    HDL Cholesterol 54 12/13/2019 04:47 AM    LDL,Direct 231 (H) 12/13/2019 04:47 AM    LDL, calculated Not calculated due to elevated triglyceride level 12/13/2019 04:47 AM    VLDL, calculated 84 (H) 12/13/2019 04:47 AM    Triglyceride 420 (H) 12/13/2019 04:47 AM    CHOL/HDL Ratio 6.6 12/13/2019 04:47 AM        Lab Results   Component Value Date/Time    Hemoglobin A1c 8.4 (H) 12/13/2019 04:47 AM        CT Results (most recent):  Results from Hospital Encounter encounter on 12/12/19   CTA CODE NEURO HEAD AND NECK W CONT    Narrative History: Code stroke. FINDINGS:    CT angiography was performed of the neck and head with contrast and  three-dimensional CT angiography reconstruction and reformat was performed. NASCET criteria as needed. CT dose reduction was achieved through use of a  standardized protocol tailored for this examination and automatic exposure  control for dose modulation. Mild atherosclerosis at the carotid bulbs bilaterally. There is stenosis at the  distal intracranial segment of the right vertebral artery. The basilar artery is  patent. The anterior cerebral arteries are patent bilaterally. There is  multifocal stenosis in the P2 segment of the left posterior cerebral artery. The  dural venous sinuses are patent. Impression IMPRESSION:    Stenosis at the distal segment of the right vertebral artery and multifocal  stenosis in the P2 segment of the left posterior cerebral artery. Results for orders placed or performed during the hospital encounter of 12/12/19   EKG, 12 LEAD, INITIAL   Result Value Ref Range    Ventricular Rate 90 BPM    Atrial Rate 90 BPM    P-R Interval 144 ms    QRS Duration 86 ms    Q-T Interval 418 ms    QTC Calculation (Bezet) 511 ms    Calculated P Axis 61 degrees    Calculated R Axis 42 degrees    Calculated T Axis 2 degrees    Diagnosis       !! AGE AND GENDER SPECIFIC ECG ANALYSIS !!   Normal sinus rhythm  Nonspecific T wave abnormality  Prolonged QT  Abnormal ECG  When compared with ECG of 31-OCT-2016 13:45,  Nonspecific T wave abnormality, worse in Inferior leads  Nonspecific T wave abnormality, worse in Lateral leads  QT has lengthened  Confirmed by Pam Mendoza MD (), QUIN CLEMENTS ([id number]) on 12/12/2019 1:14:23 PM          MRI Results (most recent):  Results from [place] encounter on 12/12/19   MRI BRAIN WO CONT    Narrative MRI BRAIN WITHOUT CONTRAST 12/12/2019    HISTORY: 57-year-old with hypertension and acute neurological deficit on waking. TECHNIQUE: Sagittal and axial T1-weighted, axial T2-weighted, axial and coronal  FLAIR, axial T2-weighted gradient-echo, axial diffusion weighted images with ADC  maps of the brain. COMPARISON: Head CT December 12, 2019    FINDINGS: On the diffusion weighted sequence there is a focus of restricted  diffusion in the deep left periventricular white matter and corpus callosum  posteriorly with associated hyperintense FLAIR signal. There is a focus first of  restricted diffusion in the left cerebellar hemisphere posteriorly. There is  variable restricted diffusion within the right paramedian yariel. There is  variable hyperintense FLAIR signal within the yariel that may be associated with  older ischemic events. Old infarcts are present in the thalami, left basal ganglia and left corona  radiata. There is no acute intracranial hemorrhage. Specifically there is no thalamic  hemorrhage. On the T2-weighted and FLAIR sequences, there are white matter  hyperintensities compatible with chronic small vessel ischemic disease. A  subcutaneous nodule in the left frontoparietal scalp is likely a sebaceous cyst.    There is no hydrocephalus, intra-axial mass or extra-axial hematoma. Impression IMPRESSION:    1. Acute to early subacute infarcts in the left cerebellar hemisphere, in the  periventricular deep left frontoparietal white matter and likely additional  areas of restricted diffusion in the right paramedian yariel. 2. Old lacunar infarcts in the corpus striatum and periventricular white matter  as well as within the left corona radiata. US Results (most recent):  Results from [place] encounter on 11/19/14   US ABD COMP    Narrative Complete Abdomen Ultrasound dated   11/19/2014    Reference Exam: None    Indication: Pancreatitis     Findings:  The spleen, kidneys, gallbladder, common duct, and visualized portions  of the IVC and abdominal aorta, appear normal.  The spleen measures 9.0 cm, the  left kidney measures 11.2 cm, the right kidney measures 10.4 cm, the liver  measures 17.2 cm, and the common duct measures 3 mm. Doppler imaging shows  normal directional flow in the portal vein. The pancreas is somewhat indistinct  in appearance, with the pancreatic duct upper normal at 3 mm. The liver shows  increased echogenicity suggesting fibrofatty changes. Impression Impression: Fibrofatty appearing changes in the liver, indistinct appearing  pancreas suggestive of pancreatitis. No fluid collection noted. Most recent CTA  Results from [place] encounter on 12/12/19   CTA CODE NEURO HEAD AND NECK W CONT    Narrative History: Code stroke. FINDINGS:    CT angiography was performed of the neck and head with contrast and  three-dimensional CT angiography reconstruction and reformat was performed. NASCET criteria as needed. CT dose reduction was achieved through use of a  standardized protocol tailored for this examination and automatic exposure  control for dose modulation. Mild atherosclerosis at the carotid bulbs bilaterally. There is stenosis at the  distal intracranial segment of the right vertebral artery. The basilar artery is  patent. The anterior cerebral arteries are patent bilaterally. There is  multifocal stenosis in the P2 segment of the left posterior cerebral artery. The  dural venous sinuses are patent. Impression IMPRESSION:    Stenosis at the distal segment of the right vertebral artery and multifocal  stenosis in the P2 segment of the left posterior cerebral artery. Most recent MRI  Results from [place] encounter on 12/12/19   MRI BRAIN WO CONT    Narrative MRI BRAIN WITHOUT CONTRAST 12/12/2019    HISTORY: 41-year-old with hypertension and acute neurological deficit on waking.     TECHNIQUE: Sagittal and axial T1-weighted, axial T2-weighted, axial and coronal  FLAIR, axial T2-weighted gradient-echo, axial diffusion weighted images with ADC  maps of the brain. COMPARISON: Head CT December 12, 2019    FINDINGS: On the diffusion weighted sequence there is a focus of restricted  diffusion in the deep left periventricular white matter and corpus callosum  posteriorly with associated hyperintense FLAIR signal. There is a focus first of  restricted diffusion in the left cerebellar hemisphere posteriorly. There is  variable restricted diffusion within the right paramedian yariel. There is  variable hyperintense FLAIR signal within the yariel that may be associated with  older ischemic events. Old infarcts are present in the thalami, left basal ganglia and left corona  radiata. There is no acute intracranial hemorrhage. Specifically there is no thalamic  hemorrhage. On the T2-weighted and FLAIR sequences, there are white matter  hyperintensities compatible with chronic small vessel ischemic disease. A  subcutaneous nodule in the left frontoparietal scalp is likely a sebaceous cyst.    There is no hydrocephalus, intra-axial mass or extra-axial hematoma. Impression IMPRESSION:    1. Acute to early subacute infarcts in the left cerebellar hemisphere, in the  periventricular deep left frontoparietal white matter and likely additional  areas of restricted diffusion in the right paramedian yariel. 2. Old lacunar infarcts in the corpus striatum and periventricular white matter  as well as within the left corona radiata.            Signed By: Ivory Arnold MD     December 14, 2019

## 2019-12-14 NOTE — PROGRESS NOTES
12/14/19 0714   NIH Stroke Scale   Interval Other (comment)  (Dual NIH w/ JERRY Bailey)   LOC 0   LOC Questions 0   LOC Commands 0   Best Gaze 0   Visual 0   Facial Palsy 2   Motor Right Arm 0   Motor Left Arm 3   Motor Right Leg 0   Motor Left Leg 4   Limb Ataxia 0   Sensory 0   Best Language 0   Dysarthria 1   Extinction and Inattention 0   Total 10

## 2019-12-14 NOTE — PROGRESS NOTES
12/13/19 1910   NIH Stroke Scale   Interval Other (comment)  (With Yon Topete RN)   LOC 0   LOC Questions 0   LOC Commands 0   Best Gaze 0   Visual 0   Facial Palsy 2   Motor Right Arm 0   Motor Left Arm 3   Motor Right Leg 0   Motor Left Leg 4   Limb Ataxia 0   Sensory 0   Best Language 0   Dysarthria 1   Extinction and Inattention 0   Total 10

## 2019-12-14 NOTE — PROGRESS NOTES
Hospitalist Note     Admit Date:  2019  9:07 AM   Name:  Jonathon Moss   Age:  53 y.o.  :  1975   MRN:  [id number]   PCP:  Myrna Jackson MD  Treatment Team: Attending Provider: Rachel Kiran MD; Consulting Provider: Nia Diaz MD; Consulting Provider: Sukumar Love MD; Care Manager: Gaby Hernandez RN; Consulting Provider: Shedrick Bumpers, MD; Occupational Therapist: Ivanna Mcdaniel OT    HPI/Subjective:      54 y/o smoker with DM, HTN, who presented to ER  with L leg and arm weakness, L facial droop, dysarthria. First noted L leg weakness late night  when he woke up to go to the bathroom. He was normal when he went to bed around 1030. Woke up and had persistent weakness L leg and also now noted in L arm. EMS called. Noted to have slurred speech and L facial droop as well. Code S called in ER around 9am.  MRI with acute infarcts in L cerebellar hemisphere, deep frontoparietal white matter, and R paramedian yariel. Also noted old lacunar infarcts. No large vessel occlusion on CTA, but some stenosis noted. No hx afib, TIA, CVA. No CP, palpitations, SOB. Today, no ac events, tolerated po, no new neuro spx or improvement    Objective:     Patient Vitals for the past 24 hrs:   Temp Pulse Resp BP SpO2   19 1200 97.9 °F (36.6 °C) 68 18 144/79 97 %   19 0718 98.4 °F (36.9 °C) 96 17 165/87 98 %   19 0400 98 °F (36.7 °C) 96 17 (!) 161/97 96 %   19 0000 98.4 °F (36.9 °C) 83 17 170/86 94 %   19 2000 98.1 °F (36.7 °C) 84 17 172/86 94 %   19 1600 98.7 °F (37.1 °C) 89 17 154/90 93 %     Oxygen Therapy  O2 Sat (%): 97 % (19 1200)  Pulse via Oximetry: 95 beats per minute (19 1022)  No intake or output data in the 24 hours ending 19 1314    Physical Exam:  General:    Well nourished. Alert and oriented. Eyes:   Normal sclera. Extraocular movements intact. CV:   RRR. No murmur, rub, or gallop.     Lungs:  Clear to auscultation bilaterally. No wheezing, rhonchi, or rales  Extremities: Warm and dry. No cyanosis or edema. Neurologic: CN II-XII intact except for mild L facial droop. Sensation intact. Finger to nose intact. PERRLA. Mild slurred speech. No confusion. STR 1/5 LUE and LLE. Skin:     No rashes or jaundice. No wounds. Psych:  Normal mood and affect. I reviewed the labs, imaging, EKGs, telemetry, and other studies done this admission.   Data Review:   Recent Results (from the past 24 hour(s))   GLUCOSE, POC    Collection Time: 12/13/19  3:58 PM   Result Value Ref Range    Glucose (POC) 139 (H) 65 - 100 mg/dL   PLEASE READ & DOCUMENT PPD TEST IN 24 HRS    Collection Time: 12/13/19  5:32 PM   Result Value Ref Range    PPD Negative Negative    mm Induration 0 0 - 5 mm   GLUCOSE, POC    Collection Time: 12/13/19  9:32 PM   Result Value Ref Range    Glucose (POC) 178 (H) 65 - 100 mg/dL   GLUCOSE, POC    Collection Time: 12/14/19  7:14 AM   Result Value Ref Range    Glucose (POC) 219 (H) 65 - 100 mg/dL   GLUCOSE, POC    Collection Time: 12/14/19 11:41 AM   Result Value Ref Range    Glucose (POC) 207 (H) 65 - 100 mg/dL       All Micro Results     None          Current Facility-Administered Medications   Medication Dose Route Frequency    magnesium oxide (MAG-OX) tablet 400 mg  400 mg Oral BID    metoprolol tartrate (LOPRESSOR) tablet 25 mg  25 mg Oral BID    hydrALAZINE (APRESOLINE) 20 mg/mL injection 20 mg  20 mg IntraVENous Q4H PRN    atorvastatin (LIPITOR) tablet 80 mg  80 mg Oral QHS    amLODIPine (NORVASC) tablet 10 mg  10 mg Oral DAILY    cloNIDine HCl (CATAPRES) tablet 0.1 mg  0.1 mg Oral Q4H PRN    lisinopril (PRINIVIL, ZESTRIL) tablet 40 mg  40 mg Oral DAILY    sodium chloride (NS) flush 5-40 mL  5-40 mL IntraVENous Q8H    sodium chloride (NS) flush 5-40 mL  5-40 mL IntraVENous PRN    ondansetron (ZOFRAN) injection 4 mg  4 mg IntraVENous Q4H PRN    aspirin chewable tablet 81 mg  81 mg Oral DAILY    acetaminophen (TYLENOL) tablet 650 mg  650 mg Oral Q4H PRN    polyethylene glycol (MIRALAX) packet 17 g  17 g Oral DAILY PRN    enoxaparin (LOVENOX) injection 40 mg  40 mg SubCUTAneous Q24H    insulin lispro (HUMALOG) injection   SubCUTAneous AC&HS    dextrose 40% (GLUTOSE) oral gel 1 Tube  15 g Oral PRN    glucagon (GLUCAGEN) injection 1 mg  1 mg IntraMUSCular PRN    dextrose (D50W) injection syrg 12.5-25 g  25-50 mL IntraVENous PRN       Other Studies:  No results found. Results for orders placed or performed during the hospital encounter of 19   2D ECHO COMPLETE ADULT (TTE) [address] [facility] [address] [facility], [address] [facility]  [phone]    Transthoracic Echocardiogram  2D, M-mode, Doppler, and Color Doppler    Patient: Jake Manuel  MR #: [id number]  : 1975  Age: 53 years  Gender: Male  Study date: 12-Dec-2019  Account #: [de-identified]  Height: 66 in  Weight: 209.7 lb  BSA: 2.04 mï¾²  Status:Routine  Location: ER03  BP: 133/ 76    Allergies: NO KNOWN ALLERGENS    Sonographer:  LUZ Murdock  Group:  [facility]  Referring Physician:  Jacques Mejia. Luke Ireland MD  Reading Physician:  Cyn Oconnor MD [facility]    INDICATIONS: CVA    PROCEDURE: This was a routine study. A transthoracic echocardiogram was  performed. The study included complete 2D imaging, M-mode, complete spectral  Doppler, and color Doppler. Intravenous contrast (Definity) was administered. Image quality was adequate. LEFT VENTRICLE: Size was normal. Systolic function was normal. Ejection  fraction was estimated in the range of 65 % to 70 %. There were no regional  wall motion abnormalities. There was mild concentric hypertrophy.  Left  ventricular diastolic function parameters were normal. Avg E/e': 11.05.    RIGHT VENTRICLE: The size was normal. Systolic function was normal. The  tricuspid jet envelope definition was inadequate for estimation of RV   systolic  pressure. LEFT ATRIUM: Size was normal.    ATRIAL SEPTUM: Agitated saline contrast injection (bubble study) was   performed. There was a right-to-left shunt, in the baseline state. RIGHT ATRIUM: Size was normal.    SYSTEMIC VEINS: IVC: The inferior vena cava was normal in size and course. The  respirophasic change in diameter was more than 50%. AORTIC VALVE: The valve was trileaflet. Leaflets exhibited mild sclerosis. There was no evidence for stenosis. There was no insufficiency. MITRAL VALVE: Valve structure was normal. There was no evidence for stenosis. There was no regurgitation. TRICUSPID VALVE: The valve structure was normal. There was no evidence for  stenosis. There was trivial regurgitation. PULMONIC VALVE: Not well visualized. There was no evidence for stenosis. There  was no insufficiency. PERICARDIUM: There was no pericardial effusion. AORTA: The root exhibited normal size. SUMMARY:    -  Left ventricle: Systolic function was normal. Ejection fraction was  estimated in the range of 65 % to 70 %. There were no regional wall motion  abnormalities. There was mild concentric hypertrophy. -  Atrial septum: Agitated saline contrast injection (bubble study) was  performed. There was a right-to-left shunt, in the baseline state. -  Inferior vena cava, hepatic veins: The respirophasic change in diameter   was  more than 50%. SYSTEM MEASUREMENT TABLES    2D mode  AoR Diam (2D): 2.8 cm  LA Dimension (2D): 3.5 cm  Left Atrium Systolic Volume Index; Method of Disks, Biplane; 2D mode;: 26   ml/m2  IVS/LVPW (2D): 1  IVSd (2D): 1.2 cm  LVIDd (2D): 5.2 cm  LVIDs (2D): 2.9 cm  LVOT Area (2D): 2.3 cm2  LVPWd (2D): 1.2 cm  RVIDd (2D): 2.2 cm    Tissue Doppler Imaging  LV Peak Early Thomas Tissue Rc; Lateral MA (TDI): 8.1 cm/s  LV Peak Early Thomas Tissue Rc; Medial MA (TDI): 7.6 cm/s    Unspecified Scan Mode  Peak Grad; Mean;  Antegrade Flow: 14 mm[Hg]  Vmax; Antegrade Flow: 185 cm/s  LVOT Diam: 1.7 cm  MV Peak Rc/LV Peak Tissue Rc E-Wave; Lateral MA: 10.7  MV Peak Rc/LV Peak Tissue Rc E-Wave; Medial MA: 11.4    Prepared and signed by    Deng Dinero MD [facility]  Signed 12-Dec-2019 15:46:58             Assessment and Plan:     Dx:  1- Acute embolic stroke, peristent weakness on left side  2- Hypertension, controlled  3- Hypokalemia and hypomagnesemia, replaced    Rx:  ASA  Statin and Omega 3  Neuro checks  Cardiology consulted for further opinion  Neuro follow    Dispo; need rehab, no insurance?, CM following    Signed:  Darylene Dotter, MD

## 2019-12-14 NOTE — PROGRESS NOTES
Problem: Pressure Injury - Risk of  Goal: *Prevention of pressure injury  Description  Document Dewey Scale and appropriate interventions in the flowsheet. Outcome: Progressing Towards Goal  Note: Pressure Injury Interventions:  Sensory Interventions: Assess changes in LOC    Moisture Interventions: Absorbent underpads, Limit adult briefs, Minimize layers    Activity Interventions: Increase time out of bed, Pressure redistribution bed/mattress(bed type)    Mobility Interventions: HOB 30 degrees or less, Pressure redistribution bed/mattress (bed type)    Nutrition Interventions: Offer support with meals,snacks and hydration, Document food/fluid/supplement intake    Friction and Shear Interventions: HOB 30 degrees or less, Minimize layers                Problem: Patient Education: Go to Patient Education Activity  Goal: Patient/Family Education  Outcome: Progressing Towards Goal     Problem: Falls - Risk of  Goal: *Absence of Falls  Description  Document Jeanne Fall Risk and appropriate interventions in the flowsheet.   Outcome: Progressing Towards Goal  Note: Fall Risk Interventions:  Mobility Interventions: Assess mobility with egress test, Communicate number of staff needed for ambulation/transfer    Mentation Interventions: Adequate sleep, hydration, pain control    Medication Interventions: Bed/chair exit alarm, Patient to call before getting OOB, Teach patient to arise slowly    Elimination Interventions: Bed/chair exit alarm, Call light in reach, Patient to call for help with toileting needs, Toilet paper/wipes in reach, Urinal in reach    History of Falls Interventions: Bed/chair exit alarm, Door open when patient unattended, Investigate reason for fall         Problem: Patient Education: Go to Patient Education Activity  Goal: Patient/Family Education  Outcome: Progressing Towards Goal     Problem: Diabetes Self-Management  Goal: *Disease process and treatment process  Description  Define diabetes and identify own type of diabetes; list 3 options for treating diabetes. Outcome: Progressing Towards Goal  Goal: *Incorporating nutritional management into lifestyle  Description  Describe effect of type, amount and timing of food on blood glucose; list 3 methods for planning meals. Outcome: Progressing Towards Goal  Goal: *Incorporating physical activity into lifestyle  Description  State effect of exercise on blood glucose levels. Outcome: Progressing Towards Goal  Goal: *Developing strategies to promote health/change behavior  Description  Define the ABC's of diabetes; identify appropriate screenings, schedule and personal plan for screenings. Outcome: Progressing Towards Goal  Goal: *Using medications safely  Description  State effect of diabetes medications on diabetes; name diabetes medication taking, action and side effects. Outcome: Progressing Towards Goal  Goal: *Monitoring blood glucose, interpreting and using results  Description  Identify recommended blood glucose targets  and personal targets. Outcome: Progressing Towards Goal  Goal: *Prevention, detection, treatment of acute complications  Description  List symptoms of hyper- and hypoglycemia; describe how to treat low blood sugar and actions for lowering  high blood glucose level. Outcome: Progressing Towards Goal  Goal: *Prevention, detection and treatment of chronic complications  Description  Define the natural course of diabetes and describe the relationship of blood glucose levels to long term complications of diabetes.   Outcome: Progressing Towards Goal  Goal: *Developing strategies to address psychosocial issues  Description  Describe feelings about living with diabetes; identify support needed and support network  Outcome: Progressing Towards Goal     Problem: Patient Education: Go to Patient Education Activity  Goal: Patient/Family Education  Outcome: Progressing Towards Goal

## 2019-12-14 NOTE — PROGRESS NOTES
Problem: Pressure Injury - Risk of  Goal: *Prevention of pressure injury  Description  Document Dewey Scale and appropriate interventions in the flowsheet. Note: Pressure Injury Interventions:  Sensory Interventions: Assess changes in LOC    Moisture Interventions: Absorbent underpads, Apply protective barrier, creams and emollients, Offer toileting Q_hr, Moisture barrier, Minimize layers, Maintain skin hydration (lotion/cream)    Activity Interventions: PT/OT evaluation, Pressure redistribution bed/mattress(bed type), Increase time out of bed    Mobility Interventions: PT/OT evaluation, Pressure redistribution bed/mattress (bed type)    Nutrition Interventions: Discuss nutritional consult with provider    Friction and Shear Interventions: HOB 30 degrees or less, Feet elevated on foot rest                Problem: Diabetes Self-Management  Goal: *Disease process and treatment process  Description  Define diabetes and identify own type of diabetes; list 3 options for treating diabetes.   Outcome: Progressing Towards Goal

## 2019-12-15 PROBLEM — E78.5 HYPERLIPEMIA: Status: ACTIVE | Noted: 2019-12-15

## 2019-12-15 PROBLEM — I49.9 ARRHYTHMIA: Status: ACTIVE | Noted: 2019-12-15

## 2019-12-15 LAB
GLUCOSE BLD STRIP.AUTO-MCNC: 144 MG/DL (ref 65–100)
GLUCOSE BLD STRIP.AUTO-MCNC: 173 MG/DL (ref 65–100)
GLUCOSE BLD STRIP.AUTO-MCNC: 192 MG/DL (ref 65–100)
MM INDURATION POC: 0 MM (ref 0–5)
PPD POC: NEGATIVE NEGATIVE

## 2019-12-15 PROCEDURE — 74011250637 HC RX REV CODE- 250/637: Performed by: INTERNAL MEDICINE

## 2019-12-15 PROCEDURE — 74011250637 HC RX REV CODE- 250/637: Performed by: HOSPITALIST

## 2019-12-15 PROCEDURE — 82962 GLUCOSE BLOOD TEST: CPT

## 2019-12-15 PROCEDURE — 65660000000 HC RM CCU STEPDOWN

## 2019-12-15 PROCEDURE — 74011636637 HC RX REV CODE- 636/637: Performed by: HOSPITALIST

## 2019-12-15 PROCEDURE — 97112 NEUROMUSCULAR REEDUCATION: CPT

## 2019-12-15 PROCEDURE — 74011250636 HC RX REV CODE- 250/636: Performed by: INTERNAL MEDICINE

## 2019-12-15 PROCEDURE — 74011636637 HC RX REV CODE- 636/637: Performed by: INTERNAL MEDICINE

## 2019-12-15 PROCEDURE — 97530 THERAPEUTIC ACTIVITIES: CPT

## 2019-12-15 PROCEDURE — 97166 OT EVAL MOD COMPLEX 45 MIN: CPT

## 2019-12-15 RX ORDER — INSULIN GLARGINE 100 [IU]/ML
10 INJECTION, SOLUTION SUBCUTANEOUS DAILY
Status: DISCONTINUED | OUTPATIENT
Start: 2019-12-15 | End: 2019-12-16

## 2019-12-15 RX ORDER — METOPROLOL TARTRATE 50 MG/1
50 TABLET ORAL 2 TIMES DAILY
Status: DISCONTINUED | OUTPATIENT
Start: 2019-12-15 | End: 2019-12-16

## 2019-12-15 RX ADMIN — ACETAMINOPHEN 650 MG: 325 TABLET, FILM COATED ORAL at 22:10

## 2019-12-15 RX ADMIN — ASPIRIN 81 MG 81 MG: 81 TABLET ORAL at 09:51

## 2019-12-15 RX ADMIN — LISINOPRIL 40 MG: 20 TABLET ORAL at 09:51

## 2019-12-15 RX ADMIN — METOPROLOL TARTRATE 50 MG: 50 TABLET, FILM COATED ORAL at 09:51

## 2019-12-15 RX ADMIN — Medication 10 ML: at 13:15

## 2019-12-15 RX ADMIN — Medication 400 MG: at 17:58

## 2019-12-15 RX ADMIN — Medication 10 ML: at 06:21

## 2019-12-15 RX ADMIN — INSULIN GLARGINE 10 UNITS: 100 INJECTION, SOLUTION SUBCUTANEOUS at 09:52

## 2019-12-15 RX ADMIN — METOPROLOL TARTRATE 50 MG: 50 TABLET, FILM COATED ORAL at 17:58

## 2019-12-15 RX ADMIN — AMLODIPINE BESYLATE 10 MG: 10 TABLET ORAL at 09:51

## 2019-12-15 RX ADMIN — ATORVASTATIN CALCIUM 80 MG: 80 TABLET, FILM COATED ORAL at 22:10

## 2019-12-15 RX ADMIN — ENOXAPARIN SODIUM 40 MG: 40 INJECTION SUBCUTANEOUS at 13:15

## 2019-12-15 RX ADMIN — Medication 400 MG: at 09:51

## 2019-12-15 RX ADMIN — INSULIN LISPRO 2 UNITS: 100 INJECTION, SOLUTION INTRAVENOUS; SUBCUTANEOUS at 12:10

## 2019-12-15 RX ADMIN — ACETAMINOPHEN 650 MG: 325 TABLET, FILM COATED ORAL at 09:50

## 2019-12-15 RX ADMIN — INSULIN LISPRO 2 UNITS: 100 INJECTION, SOLUTION INTRAVENOUS; SUBCUTANEOUS at 07:51

## 2019-12-15 NOTE — PROGRESS NOTES
12/15/19 0707   NIH Stroke Scale   Interval Other (comment)  (Dual NIH w/Lynn)   LOC 0   LOC Questions 0   LOC Commands 0   Best Gaze 0   Visual 0   Facial Palsy 2   Motor Right Arm 0   Motor Left Arm 3   Motor Right Leg 0   Motor Left Leg 4   Limb Ataxia 0   Sensory 0   Best Language 1   Dysarthria 0   Extinction and Inattention 0   Total 10

## 2019-12-15 NOTE — PROGRESS NOTES
Problem: Pressure Injury - Risk of  Goal: *Prevention of pressure injury  Description  Document Dewey Scale and appropriate interventions in the flowsheet. Outcome: Progressing Towards Goal  Note: Pressure Injury Interventions:  Sensory Interventions: Assess changes in LOC    Moisture Interventions: Absorbent underpads, Apply protective barrier, creams and emollients, Offer toileting Q_hr, Moisture barrier, Minimize layers, Maintain skin hydration (lotion/cream)    Activity Interventions: PT/OT evaluation, Pressure redistribution bed/mattress(bed type)    Mobility Interventions: Assess need for specialty bed, Chair cushion, PT/OT evaluation    Nutrition Interventions: Offer support with meals,snacks and hydration    Friction and Shear Interventions: HOB 30 degrees or less                Problem: Falls - Risk of  Goal: *Absence of Falls  Description  Document Jeanne Fall Risk and appropriate interventions in the flowsheet.   Outcome: Progressing Towards Goal  Note: Fall Risk Interventions:  Mobility Interventions: Assess mobility with egress test, Bed/chair exit alarm, PT Consult for assist device competence    Mentation Interventions: Adequate sleep, hydration, pain control, Bed/chair exit alarm, Door open when patient unattended    Medication Interventions: Bed/chair exit alarm    Elimination Interventions: Call light in reach, Bed/chair exit alarm    History of Falls Interventions: Door open when patient unattended, Consult care management for discharge planning, Bed/chair exit alarm

## 2019-12-15 NOTE — CONSULTS
[facility] Cardiology Consult                Date of  Admission: 12/12/2019  9:07 AM     Primary Care Physician: Jeremy Taylor MD  Primary Cardiologist:  Dr Clau Desir  Referring Physician: Hua Choudhary NP  Consulting Physician: Dr Kayli Chinchilla    CC/Reason for consult: Abnormal bubble study      Kervin Milian is a 53 y.o. male with hx of GERD, HTN, DM, pancreatitis, and GERD. The patient presented to the ER on 12/12/2019 with left leg and arm weakness, left sided facial droop, and dysarthria. Symptoms were first noted the night of 12/11/2019 after waking up to his left leg having weakness but went back to normal when he went to sleep again. When he woke up the next morning he had persistant left leg weakness, left arm weakness, L facial droop, and slurred speech. Coded S was called with MRI showing acute infarcts in the L cerebellar hemisphere and deep frontoparietal white mater, and R paramedian yariel. Other findings included old lacunar infarcts. When talking with the patient he reports no history of A. fib, chest pain, cardiac problems, palpitations, irregular heartbeats, sudden dizziness, or other in signs and symptoms of A. fib while at home. A review of previous EKGs show normal sinus rhythm who was seen by Dr. Antoinette Collins at [facility] with a Normal LHC . He currently does not feel back to baseline and still has slurred speech some trouble concentrating with some residual left-sided weakness. When talking to the patient at home he says his blood pressure has been mostly under control but review of the chart shows the patient has been seen in the ER 13 times in the past year for various medical conditions. Recent Cardiac Synopsis w/ Labs    Echo: 12/12 -  Left ventricle: Systolic function was normal. Ejection fraction was  estimated in the range of 65 % to 70 %. There were no regional wall motion  abnormalities.  There was mild concentric hypertrophy.   -  Atrial septum: Agitated saline contrast injection (bubble study) was  performed. There was a right-to-left shunt, in the baseline state.   -  Inferior vena cava, hepatic veins: The respirophasic change in diameter   Was more than 50%. EKG: NSR with one period of wide complex tachycardia   CT of the head:   Stenosis at the distal segment of the right vertebral artery and multifocal  stenosis in the P2 segment of the left posterior cerebral artery. CTA of the head and neck: 1. Age indeterminate infarctions within the left corona radiata/caudate. 2. Mild chronic small vessel ischemic changes and areas of remote infarction as  described. 3. Probable partial volume averaging the region of the left thalamus rather than  intraparenchymal hemorrhage. MRI of the brain: 1. Acute to early subacute infarcts in the left cerebellar hemisphere, in the  periventricular deep left frontoparietal white matter and likely additional  areas of restricted diffusion in the right paramedian yariel.   2. Old lacunar infarcts in the corpus striatum and periventricular white matter  as well as within the left corona radiata.   Duplex of Lower Ext: No DVT found    Lab Results   Component Value Date     12/13/2019    K 3.1 (L) 12/13/2019    MG 1.6 (L) 12/13/2019    BUN 9 12/13/2019    CREA 1.06 12/13/2019    WBC 6.6 12/13/2019    HGB 11.2 (L) 12/13/2019     12/13/2019    INR 0.8 12/12/2019    TROIQ <0.04 11/19/2014    TROIQ 0.04 06/18/2008    TROIQ <0.04 (L) 06/18/2008    CKMB <0.5 (L) 08/27/2012          Patient Active Problem List   Diagnosis Code    Hypertension I10    Diabetes ([address] E11.9    Acute pancreatitis K85.90    GERD (gastroesophageal reflux disease) K21.9    Microcytic anemia D50.9    Acute ischemic stroke ([address] I63.9    Arrhythmia I49.9    Hyperlipemia E78.5       Past Medical History:   Diagnosis Date    Acute ischemic stroke ([address] 12/12/2019    Acute pancreatitis 11/19/2014    Cerebrovascular accident (CVA) due to embolism ([address] 12/12/2019    Diabetes ([facility] 2002    Diabetes ([facility]     Diabetes mellitus     Hypertension       Past Surgical History:   Procedure Laterality Date    HX HERNIA REPAIR      HX ORTHOPAEDIC      carpal tunnel surgery     No Known Allergies   Family History   Problem Relation Age of Onset    Diabetes Mother     Diabetes Father       Social History     Socioeconomic History    Marital status:      Spouse name: Not on file    Number of children: Not on file    Years of education: Not on file    Highest education level: Not on file   Occupational History    Not on file   Social Needs    Financial resource strain: Not on file    Food insecurity:     Worry: Not on file     Inability: Not on file    Transportation needs:     Medical: Not on file     Non-medical: Not on file   Tobacco Use    Smoking status: Current Every Day Smoker     Packs/day: 0.25     Types: Cigarettes    Smokeless tobacco: Former User     Types: Chew   Substance and Sexual Activity    Alcohol use: No    Drug use: No    Sexual activity: Yes     Partners: Female     Birth control/protection: None   Lifestyle    Physical activity:     Days per week: Not on file     Minutes per session: Not on file    Stress: Not on file   Relationships    Social connections:     Talks on phone: Not on file     Gets together: Not on file     Attends Yazidism service: Not on file     Active member of club or organization: Not on file     Attends meetings of clubs or organizations: Not on file     Relationship status: Not on file    Intimate partner violence:     Fear of current or ex partner: Not on file     Emotionally abused: Not on file     Physically abused: Not on file     Forced sexual activity: Not on file   Other Topics Concern    Not on file   Social History Narrative    Not on file       Current Facility-Administered Medications   Medication Dose Route Frequency    insulin glargine (LANTUS) injection 10 Units  10 Units SubCUTAneous DAILY    metoprolol tartrate (LOPRESSOR) tablet 50 mg  50 mg Oral BID    magnesium oxide (MAG-OX) tablet 400 mg  400 mg Oral BID    hydrALAZINE (APRESOLINE) 20 mg/mL injection 20 mg  20 mg IntraVENous Q4H PRN    atorvastatin (LIPITOR) tablet 80 mg  80 mg Oral QHS    amLODIPine (NORVASC) tablet 10 mg  10 mg Oral DAILY    cloNIDine HCl (CATAPRES) tablet 0.1 mg  0.1 mg Oral Q4H PRN    lisinopril (PRINIVIL, ZESTRIL) tablet 40 mg  40 mg Oral DAILY    sodium chloride (NS) flush 5-40 mL  5-40 mL IntraVENous Q8H    sodium chloride (NS) flush 5-40 mL  5-40 mL IntraVENous PRN    ondansetron (ZOFRAN) injection 4 mg  4 mg IntraVENous Q4H PRN    aspirin chewable tablet 81 mg  81 mg Oral DAILY    acetaminophen (TYLENOL) tablet 650 mg  650 mg Oral Q4H PRN    polyethylene glycol (MIRALAX) packet 17 g  17 g Oral DAILY PRN    enoxaparin (LOVENOX) injection 40 mg  40 mg SubCUTAneous Q24H    insulin lispro (HUMALOG) injection   SubCUTAneous AC&HS    dextrose 40% (GLUTOSE) oral gel 1 Tube  15 g Oral PRN    glucagon (GLUCAGEN) injection 1 mg  1 mg IntraMUSCular PRN    dextrose (D50W) injection syrg 12.5-25 g  25-50 mL IntraVENous PRN       Review of Systems   Constitution: Negative for chills, diaphoresis, fever, malaise/fatigue, weight gain and weight loss. HENT: Negative. Eyes: Negative. Cardiovascular: Negative for chest pain (currently pain free), claudication, cyanosis, dyspnea on exertion, irregular heartbeat, leg swelling, near-syncope, orthopnea, palpitations, paroxysmal nocturnal dyspnea and syncope. Respiratory: Negative for cough, shortness of breath and wheezing. Endocrine: Negative. Skin: Negative. Musculoskeletal: Negative. Gastrointestinal: Negative for nausea and vomiting. Genitourinary: Negative. Neurological: Positive for focal weakness. Negative for dizziness. Psychiatric/Behavioral: Negative. Allergic/Immunologic: Negative.          Physical Exam  Vitals:    12/14/19 2000 12/15/19 0000 12/15/19 0400 12/15/19 0800   BP: 127/82 143/87 (!) 158/94 126/82   Pulse: 77 71 73 74   Resp: 17 18 20 18   Temp: 98.6 °F (37 °C) 99.7 °F (37.6 °C) 99 °F (37.2 °C) 98.9 °F (37.2 °C)   SpO2: 90% 91% 94% 93%   Weight:       Height:           Physical Exam:  General: Well Developed, Well Nourished, No Acute Distress  HEENT: pupils equal and round, no abnormalities noted  Neck: supple, no JVD, no carotid bruits  Heart: S1S2 with RRR  1/6 Murmur or gallops  Lungs: Clear throughout auscultation bilaterally without adventitious sounds  Abd: soft, nontender, nondistended, with good bowel sounds  Ext: warm, no edema, calves supple/nontender, pulses 2+ bilaterally  Skin: warm and dry  Psychiatric: Normal mood and affect  Neurologic: A and O x3, slurred speech, trouble concentratring      Labs:   Recent Labs     12/13/19  0447      K 3.1*   MG 1.6*   BUN 9   CREA 1.06   *   WBC 6.6   HGB 11.2*   HCT 35.7*      TRIGL 420*   *   HDL 54       Echo Results  (Last 48 hours)    None        Mri Brain Wo Cont    Result Date: 12/12/2019  IMPRESSION: 1. Acute to early subacute infarcts in the left cerebellar hemisphere, in the periventricular deep left frontoparietal white matter and likely additional areas of restricted diffusion in the right paramedian yariel. 2. Old lacunar infarcts in the corpus striatum and periventricular white matter as well as within the left martinez radiata. Cta Code Neuro Head And Neck W Cont    Result Date: 12/12/2019  IMPRESSION: Stenosis at the distal segment of the right vertebral artery and multifocal stenosis in the P2 segment of the left posterior cerebral artery. Ct Code Neuro Head Wo Contrast    Result Date: 12/12/2019  Impression: 1. Age indeterminate infarctions within the left corona radiata/caudate. 2. Mild chronic small vessel ischemic changes and areas of remote infarction as described.  3. Probable partial volume averaging the region of the left thalamus rather than intraparenchymal hemorrhage. Results discussed with Dr. Carmen Engel at the time of dictation at 9:20 AM on 12/12/2019. Duplex Lower Ext Venous Bilat    Result Date: 12/13/2019  IMPRESSION: No evidence of deep venous thrombosis in either lower extremity        Assessment/Plan:     Assessment:      Principal Problem:    Acute ischemic stroke ([facility] (12/12/2019) CVA with possible PFO seen by echo with positive bubble study. Will need further workup as an outpatient for considerations of closure including ISMAEL. Will need further education and reduction of risk factors. Further recommendations pending clinical course and attending recommendations. Active Problems:    Hypertension BP parameters per neurology. Diabetes ([facility] Uncontrolled, per primary team      Arrhythmia (12/15/2019) Looks like wide complex tachycardia. Now on BB continue to monitor. Consider outpatient monitoring halter monitor vrs IRL. Hyperlipemia (12/15/2019) Lipitor 80 mg added this admission, will need liver panel and Lipid panel in follow up          Thank you very much for this referral. We appreciate the opportunity to participate in this patient's care. We will follow along with above stated plan.     Licha Walker NP  Consulting MD: Dr Sarbjit Bro

## 2019-12-15 NOTE — PROGRESS NOTES
Hospitalist Note     Admit Date:  2019  9:07 AM   Name:  Shahana Mcdaniel   Age:  53 y.o.  :  1975   MRN:  [id number]   PCP:  Hong Guillen MD  Treatment Team: Attending Provider: Courtney Mandujano MD; Consulting Provider: Miguel Schneider MD; Consulting Provider: Ashish Richardson MD; Care Manager: Lia Werner RN; Consulting Provider: Kristy Vieira MD; Primary Nurse: Rosezella Apgar, RN; Occupational Therapist: Manohar Martines OT    HPI/Subjective:      54 y/o smoker with DM, HTN, who presented to ER  with L leg and arm weakness, L facial droop, dysarthria. First noted L leg weakness late night  when he woke up to go to the bathroom. He was normal when he went to bed around 1030. Woke up and had persistent weakness L leg and also now noted in L arm. EMS called. Noted to have slurred speech and L facial droop as well. Code S called in ER around 9am.  MRI with acute infarcts in L cerebellar hemisphere, deep frontoparietal white matter, and R paramedian yariel. Also noted old lacunar infarcts. No large vessel occlusion on CTA, but some stenosis noted. No hx afib, TIA, CVA. No CP, palpitations, SOB. Today, no ac events, tolerated po, no new neuro spx or improvement, no changes, ISMAEL planned today    Objective:     Patient Vitals for the past 24 hrs:   Temp Pulse Resp BP SpO2   12/15/19 1200 98.8 °F (37.1 °C) 63 18 147/89 93 %   12/15/19 0800 98.9 °F (37.2 °C) 74 18 126/82 93 %   12/15/19 0400 99 °F (37.2 °C) 73 20 (!) 158/94 94 %   12/15/19 0000 99.7 °F (37.6 °C) 71 18 143/87 91 %   19 2000 98.6 °F (37 °C) 77 17 127/82 90 %   19 1747 98.5 °F (36.9 °C) 77 16 159/84 96 %   19 1600 97.9 °F (36.6 °C) 77 18 135/83 96 %     Oxygen Therapy  O2 Sat (%): 93 % (12/15/19 1200)  Pulse via Oximetry: 95 beats per minute (19 1022)  No intake or output data in the 24 hours ending 12/15/19 1529    Physical Exam:  General:    Well nourished.   Alert and oriented. Eyes:   Normal sclera. Extraocular movements intact. CV:   RRR. No murmur, rub, or gallop. Lungs:  Clear to auscultation bilaterally. No wheezing, rhonchi, or rales  Extremities: Warm and dry. No cyanosis or edema. Neurologic: CN II-XII intact except for mild L facial droop. Sensation intact. Finger to nose intact. PERRLA. Mild slurred speech. No confusion. STR 1/5 LUE and LLE. Skin:     No rashes or jaundice. No wounds. Psych:  Normal mood and affect. I reviewed the labs, imaging, EKGs, telemetry, and other studies done this admission.   Data Review:   Recent Results (from the past 24 hour(s))   GLUCOSE, POC    Collection Time: 12/14/19  4:23 PM   Result Value Ref Range    Glucose (POC) 192 (H) 65 - 100 mg/dL   PLEASE READ & DOCUMENT PPD TEST IN 48 HRS    Collection Time: 12/14/19  5:10 PM   Result Value Ref Range    PPD Negative Negative    mm Induration 0 0 - 5 mm   GLUCOSE, POC    Collection Time: 12/14/19  9:28 PM   Result Value Ref Range    Glucose (POC) 143 (H) 65 - 100 mg/dL   GLUCOSE, POC    Collection Time: 12/15/19  7:20 AM   Result Value Ref Range    Glucose (POC) 173 (H) 65 - 100 mg/dL   GLUCOSE, POC    Collection Time: 12/15/19 11:30 AM   Result Value Ref Range    Glucose (POC) 192 (H) 65 - 100 mg/dL       All Micro Results     None          Current Facility-Administered Medications   Medication Dose Route Frequency    insulin glargine (LANTUS) injection 10 Units  10 Units SubCUTAneous DAILY    metoprolol tartrate (LOPRESSOR) tablet 50 mg  50 mg Oral BID    magnesium oxide (MAG-OX) tablet 400 mg  400 mg Oral BID    hydrALAZINE (APRESOLINE) 20 mg/mL injection 20 mg  20 mg IntraVENous Q4H PRN    atorvastatin (LIPITOR) tablet 80 mg  80 mg Oral QHS    amLODIPine (NORVASC) tablet 10 mg  10 mg Oral DAILY    cloNIDine HCl (CATAPRES) tablet 0.1 mg  0.1 mg Oral Q4H PRN    lisinopril (PRINIVIL, ZESTRIL) tablet 40 mg  40 mg Oral DAILY    sodium chloride (NS) flush 5-40 mL 5-40 mL IntraVENous Q8H    sodium chloride (NS) flush 5-40 mL  5-40 mL IntraVENous PRN    ondansetron (ZOFRAN) injection 4 mg  4 mg IntraVENous Q4H PRN    aspirin chewable tablet 81 mg  81 mg Oral DAILY    acetaminophen (TYLENOL) tablet 650 mg  650 mg Oral Q4H PRN    polyethylene glycol (MIRALAX) packet 17 g  17 g Oral DAILY PRN    enoxaparin (LOVENOX) injection 40 mg  40 mg SubCUTAneous Q24H    insulin lispro (HUMALOG) injection   SubCUTAneous AC&HS    dextrose 40% (GLUTOSE) oral gel 1 Tube  15 g Oral PRN    glucagon (GLUCAGEN) injection 1 mg  1 mg IntraMUSCular PRN    dextrose (D50W) injection syrg 12.5-25 g  25-50 mL IntraVENous PRN       Other Studies:  No results found. Results for orders placed or performed during the hospital encounter of 19   2D ECHO COMPLETE ADULT (TTE) [address]  [address] [facility], [address] [facility]  [phone]    Transthoracic Echocardiogram  2D, M-mode, Doppler, and Color Doppler    Patient: Ron Arroyo  MR #: [id number]  : 1975  Age: 53 years  Gender: Male  Study date: 12-Dec-2019  Account #: [de-identified]  Height: 66 in  Weight: 209.7 lb  BSA: 2.04 mï¾²  Status:Routine  Location: ER03  BP: 133/ 76    Allergies: NO KNOWN ALLERGENS    Sonographer:  LUZ Ortiz  Group:  [facility]  Referring Physician:  Nayely Valenzuela. Shawna Ferguson MD  Reading Physician:  Britta Roy MD [facility]    INDICATIONS: CVA    PROCEDURE: This was a routine study. A transthoracic echocardiogram was  performed. The study included complete 2D imaging, M-mode, complete spectral  Doppler, and color Doppler. Intravenous contrast (Definity) was administered. Image quality was adequate. LEFT VENTRICLE: Size was normal. Systolic function was normal. Ejection  fraction was estimated in the range of 65 % to 70 %. There were no regional  wall motion abnormalities. There was mild concentric hypertrophy. Left  ventricular diastolic function parameters were normal. Avg E/e': 11.05.    RIGHT VENTRICLE: The size was normal. Systolic function was normal. The  tricuspid jet envelope definition was inadequate for estimation of RV   systolic  pressure. LEFT ATRIUM: Size was normal.    ATRIAL SEPTUM: Agitated saline contrast injection (bubble study) was   performed. There was a right-to-left shunt, in the baseline state. RIGHT ATRIUM: Size was normal.    SYSTEMIC VEINS: IVC: The inferior vena cava was normal in size and course. The  respirophasic change in diameter was more than 50%. AORTIC VALVE: The valve was trileaflet. Leaflets exhibited mild sclerosis. There was no evidence for stenosis. There was no insufficiency. MITRAL VALVE: Valve structure was normal. There was no evidence for stenosis. There was no regurgitation. TRICUSPID VALVE: The valve structure was normal. There was no evidence for  stenosis. There was trivial regurgitation. PULMONIC VALVE: Not well visualized. There was no evidence for stenosis. There  was no insufficiency. PERICARDIUM: There was no pericardial effusion. AORTA: The root exhibited normal size. SUMMARY:    -  Left ventricle: Systolic function was normal. Ejection fraction was  estimated in the range of 65 % to 70 %. There were no regional wall motion  abnormalities. There was mild concentric hypertrophy. -  Atrial septum: Agitated saline contrast injection (bubble study) was  performed. There was a right-to-left shunt, in the baseline state. -  Inferior vena cava, hepatic veins: The respirophasic change in diameter   was  more than 50%.     SYSTEM MEASUREMENT TABLES    2D mode  AoR Diam (2D): 2.8 cm  LA Dimension (2D): 3.5 cm  Left Atrium Systolic Volume Index; Method of Disks, Biplane; 2D mode;: 26   ml/m2  IVS/LVPW (2D): 1  IVSd (2D): 1.2 cm  LVIDd (2D): 5.2 cm  LVIDs (2D): 2.9 cm  LVOT Area (2D): 2.3 cm2  LVPWd (2D): 1.2 cm  RVIDd (2D): 2.2 cm    Tissue Doppler Imaging  LV Peak Early Thomas Tissue Rc; Lateral MA (TDI): 8.1 cm/s  LV Peak Early Thomas Tissue Rc; Medial MA (TDI): 7.6 cm/s    Unspecified Scan Mode  Peak Grad; Mean; Antegrade Flow: 14 mm[Hg]  Vmax; Antegrade Flow: 185 cm/s  LVOT Diam: 1.7 cm  MV Peak Rc/LV Peak Tissue Rc E-Wave; Lateral MA: 10.7  MV Peak Rc/LV Peak Tissue Rc E-Wave; Medial MA: 11.4    Prepared and signed by    Stacia Miller.  Dolores Palomares MD [facility]  Signed 12-Dec-2019 15:46:58             Assessment and Plan:     Dx:  1- Acute embolic stroke, peristent weakness on left side  2- Hypertension, controlled  3- Hypokalemia and hypomagnesemia, replaced    Rx:  ASA  Statin and Omega 3  Neuro checks  Seen by cardiology  Plan ISMAEL  Neuro follow    Dispo; need rehab, no insurance?, CM following    Signed:  Nikita Ashton MD

## 2019-12-15 NOTE — PROGRESS NOTES
12/14/19 1900   NIH Stroke Scale   Interval Other (comment)  (Dual with JERRY Soler )   LOC 0   LOC Questions 0   LOC Commands 0   Best Gaze 0   Visual 0   Facial Palsy 2   Motor Right Arm 0   Motor Left Arm 3   Motor Right Leg 0   Motor Left Leg 4   Limb Ataxia 0   Sensory 0   Best Language 0   Dysarthria 1   Extinction and Inattention 0   Total 10

## 2019-12-16 LAB
GLUCOSE BLD STRIP.AUTO-MCNC: 104 MG/DL (ref 65–100)
GLUCOSE BLD STRIP.AUTO-MCNC: 114 MG/DL (ref 65–100)
GLUCOSE BLD STRIP.AUTO-MCNC: 144 MG/DL (ref 65–100)
GLUCOSE BLD STRIP.AUTO-MCNC: 162 MG/DL (ref 65–100)
GLUCOSE BLD STRIP.AUTO-MCNC: 92 MG/DL (ref 65–100)

## 2019-12-16 PROCEDURE — 99232 SBSQ HOSP IP/OBS MODERATE 35: CPT | Performed by: PHYSICAL MEDICINE & REHABILITATION

## 2019-12-16 PROCEDURE — B24BZZ4 ULTRASONOGRAPHY OF HEART WITH AORTA, TRANSESOPHAGEAL: ICD-10-PCS | Performed by: INTERNAL MEDICINE

## 2019-12-16 PROCEDURE — 74011636637 HC RX REV CODE- 636/637: Performed by: INTERNAL MEDICINE

## 2019-12-16 PROCEDURE — 77030020263 HC SOL INJ SOD CL0.9% LFCR 1000ML

## 2019-12-16 PROCEDURE — 74011636637 HC RX REV CODE- 636/637: Performed by: HOSPITALIST

## 2019-12-16 PROCEDURE — 74011250636 HC RX REV CODE- 250/636: Performed by: INTERNAL MEDICINE

## 2019-12-16 PROCEDURE — 74011250637 HC RX REV CODE- 250/637: Performed by: HOSPITALIST

## 2019-12-16 PROCEDURE — 97530 THERAPEUTIC ACTIVITIES: CPT

## 2019-12-16 PROCEDURE — 74011250637 HC RX REV CODE- 250/637: Performed by: INTERNAL MEDICINE

## 2019-12-16 PROCEDURE — 93312 ECHO TRANSESOPHAGEAL: CPT

## 2019-12-16 PROCEDURE — 82962 GLUCOSE BLOOD TEST: CPT

## 2019-12-16 PROCEDURE — 99152 MOD SED SAME PHYS/QHP 5/>YRS: CPT

## 2019-12-16 PROCEDURE — 74011250636 HC RX REV CODE- 250/636: Performed by: HOSPITALIST

## 2019-12-16 PROCEDURE — 65660000000 HC RM CCU STEPDOWN

## 2019-12-16 PROCEDURE — 97112 NEUROMUSCULAR REEDUCATION: CPT

## 2019-12-16 RX ORDER — INSULIN LISPRO 100 [IU]/ML
5 INJECTION, SOLUTION INTRAVENOUS; SUBCUTANEOUS
Status: DISCONTINUED | OUTPATIENT
Start: 2019-12-16 | End: 2019-12-20

## 2019-12-16 RX ORDER — INSULIN GLARGINE 100 [IU]/ML
12 INJECTION, SOLUTION SUBCUTANEOUS DAILY
Status: DISCONTINUED | OUTPATIENT
Start: 2019-12-16 | End: 2019-12-20

## 2019-12-16 RX ORDER — FENTANYL CITRATE 50 UG/ML
25-100 INJECTION, SOLUTION INTRAMUSCULAR; INTRAVENOUS
Status: DISCONTINUED | OUTPATIENT
Start: 2019-12-16 | End: 2019-12-21

## 2019-12-16 RX ORDER — SODIUM CHLORIDE 9 MG/ML
100 INJECTION, SOLUTION INTRAVENOUS CONTINUOUS
Status: DISPENSED | OUTPATIENT
Start: 2019-12-16 | End: 2019-12-16

## 2019-12-16 RX ORDER — METOPROLOL TARTRATE 50 MG/1
100 TABLET ORAL 2 TIMES DAILY
Status: DISCONTINUED | OUTPATIENT
Start: 2019-12-16 | End: 2019-12-28

## 2019-12-16 RX ORDER — MIDAZOLAM HYDROCHLORIDE 1 MG/ML
.5-5 INJECTION, SOLUTION INTRAMUSCULAR; INTRAVENOUS
Status: DISCONTINUED | OUTPATIENT
Start: 2019-12-16 | End: 2019-12-21

## 2019-12-16 RX ADMIN — INSULIN GLARGINE 12 UNITS: 100 INJECTION, SOLUTION SUBCUTANEOUS at 09:00

## 2019-12-16 RX ADMIN — METOPROLOL TARTRATE 100 MG: 50 TABLET, FILM COATED ORAL at 09:03

## 2019-12-16 RX ADMIN — ATORVASTATIN CALCIUM 80 MG: 80 TABLET, FILM COATED ORAL at 22:36

## 2019-12-16 RX ADMIN — ASPIRIN 81 MG 81 MG: 81 TABLET ORAL at 09:03

## 2019-12-16 RX ADMIN — Medication 400 MG: at 17:40

## 2019-12-16 RX ADMIN — SODIUM CHLORIDE 100 ML/HR: 900 INJECTION, SOLUTION INTRAVENOUS at 15:21

## 2019-12-16 RX ADMIN — Medication 400 MG: at 09:03

## 2019-12-16 RX ADMIN — METOPROLOL TARTRATE 100 MG: 50 TABLET, FILM COATED ORAL at 17:40

## 2019-12-16 RX ADMIN — INSULIN LISPRO 5 UNITS: 100 INJECTION, SOLUTION INTRAVENOUS; SUBCUTANEOUS at 09:00

## 2019-12-16 RX ADMIN — FENTANYL CITRATE 25 MCG: 50 INJECTION, SOLUTION INTRAMUSCULAR; INTRAVENOUS at 15:10

## 2019-12-16 RX ADMIN — MIDAZOLAM 1 MG: 1 INJECTION INTRAMUSCULAR; INTRAVENOUS at 15:10

## 2019-12-16 RX ADMIN — INSULIN LISPRO 2 UNITS: 100 INJECTION, SOLUTION INTRAVENOUS; SUBCUTANEOUS at 09:09

## 2019-12-16 RX ADMIN — Medication 5 ML: at 14:42

## 2019-12-16 RX ADMIN — INSULIN LISPRO 5 UNITS: 100 INJECTION, SOLUTION INTRAVENOUS; SUBCUTANEOUS at 16:30

## 2019-12-16 RX ADMIN — Medication 5 ML: at 22:36

## 2019-12-16 RX ADMIN — AMLODIPINE BESYLATE 10 MG: 10 TABLET ORAL at 09:09

## 2019-12-16 RX ADMIN — LISINOPRIL 40 MG: 20 TABLET ORAL at 09:03

## 2019-12-16 NOTE — PROGRESS NOTES
[facility] [place]    Carlos Strauss  Admit Date: 12/12/2019  Admit Diagnosis: Acute ischemic stroke ([facility] [I63.9]; Cerebrovascular accident (CVA) due to embolism ([facility] [I63.9]    Subjective     Patient tolerates acute PT, OT well. He remains still plegic on the left side. Sensation appears grossly intact touch tough, proprioception. Requires max assist of 2 for mobility, transfers.      Objective:     Current Facility-Administered Medications   Medication Dose Route Frequency    metoprolol tartrate (LOPRESSOR) tablet 100 mg  100 mg Oral BID    insulin glargine (LANTUS) injection 12 Units  12 Units SubCUTAneous DAILY    insulin lispro (HUMALOG) injection 5 Units  5 Units SubCUTAneous TIDAC    magnesium oxide (MAG-OX) tablet 400 mg  400 mg Oral BID    hydrALAZINE (APRESOLINE) 20 mg/mL injection 20 mg  20 mg IntraVENous Q4H PRN    atorvastatin (LIPITOR) tablet 80 mg  80 mg Oral QHS    amLODIPine (NORVASC) tablet 10 mg  10 mg Oral DAILY    cloNIDine HCl (CATAPRES) tablet 0.1 mg  0.1 mg Oral Q4H PRN    lisinopril (PRINIVIL, ZESTRIL) tablet 40 mg  40 mg Oral DAILY    sodium chloride (NS) flush 5-40 mL  5-40 mL IntraVENous Q8H    sodium chloride (NS) flush 5-40 mL  5-40 mL IntraVENous PRN    ondansetron (ZOFRAN) injection 4 mg  4 mg IntraVENous Q4H PRN    aspirin chewable tablet 81 mg  81 mg Oral DAILY    acetaminophen (TYLENOL) tablet 650 mg  650 mg Oral Q4H PRN    polyethylene glycol (MIRALAX) packet 17 g  17 g Oral DAILY PRN    enoxaparin (LOVENOX) injection 40 mg  40 mg SubCUTAneous Q24H    insulin lispro (HUMALOG) injection   SubCUTAneous AC&HS    dextrose 40% (GLUTOSE) oral gel 1 Tube  15 g Oral PRN    glucagon (GLUCAGEN) injection 1 mg  1 mg IntraMUSCular PRN    dextrose (D50W) injection syrg 12.5-25 g  25-50 mL IntraVENous PRN       Visit Vitals  /90 (BP 1 Location: Right arm, BP Patient Position: At rest)   Pulse 69   Temp 98.3 °F (36.8 °C)   Resp 18   Ht 5' 6\" (1.676 m) Wt 210 lb (95.3 kg)   SpO2 92%   BMI 33.89 kg/m²        Review of Systems: +antalgic gait. Denies chest pain, shortness of breath, cough, headache, visual problems, abdominal pain, dysurea, calf pain. Pertinent positives are as noted in the medical records and unremarkable otherwise. Physical Exam:   General: Alert and age appropriately oriented. No acute cardio respiratory distress. HEENT: + L droop ,no scleral icterus  Oral mucosa moist without cyanosis, No bruit, No JVD. Lungs: Clear to auscultation  bilaterally. Respiration even and unlabored   Heart: Regular rate and rhythm, S1, S2   No  murmurs, clicks, rub or gallops   Abdomen: Soft, non-tender, nondistended. Bowel sounds present. No organomegaly. Genitourinary: defer   Neuromuscular:        PERRL, EOMI  + dysarthria. Follows simple commands consistently. Able to identify, recall repeat. LUE     Shoulder abduction  0 /5              Elbow flexion:  0 /5               Wrist extension:  0 /5              Finger flexion;   0/5  RUE    Shoulder abduction: 5 /5                Elbow flexion:   5/5               Wrist extension:5/5              Finger flexion:  5/5  LLE     Hip flexion:  0 /5              Knee extension:  0 /5               Ankle dorsiflexion: 0  /5              Ankle plantarflexion:  0 /5                                 RLE     Hip flexion:  5 /5              Knee extension:  5 /5               Ankle dorsiflexion:  5 /5              Ankle plantarflexion:   5/5  Sensory - intact to touch,proprioception. + mildly decreased L to temp. No atrophy, no fasciculations, no tremors. Skin/extremity: No rashes, no erythema. Calf non tender BLE.                                                                                                                                 Functional Assessment:  Gross Assessment  AROM: (R UE WFL; L UE non-functional AROM) (12/15/19 1200)  PROM: Generally decreased, functional(L UE) (12/15/19 1200)  Strength: (R UE WFL; L UE non-functional; flaccid) (12/15/19 1200)  Coordination: (non-functional in the L UE) (12/15/19 1200)  Tone: Abnormal(hypotonic in L UE) (12/15/19 1200)  Sensation: Intact(reports intact grossly to light touch) (12/15/19 1200)   Bed Mobility  Rolling: Moderate assistance (12/15/19 1200)  Supine to Sit: Maximum assistance;Assist x2 (12/16/19 1100)  Sit to Supine: Maximum assistance;Assist x2 (12/16/19 1100)  Scooting: Maximum assistance(can scoot R side fairly) (12/16/19 1100)   Balance  Sitting: Impaired (12/15/19 1200)  Sitting - Static: Fair (occasional) (12/16/19 1100)  Sitting - Dynamic: Fair (occasional) (12/16/19 1100)  Standing: Impaired (12/13/19 0852)  Standing - Static: Constant support;Poor (12/16/19 1100)  Standing - Dynamic : Poor (12/13/19 0852)               Bed/Mat Mobility  Rolling: Moderate assistance (12/15/19 1200)  Supine to Sit: Maximum assistance;Assist x2 (12/16/19 1100)  Sit to Supine: Maximum assistance;Assist x2 (12/16/19 1100)  Sit to Stand: Moderate assistance;Assist x2 (12/16/19 1100)  Stand to Sit: Moderate assistance;Assist x2 (12/16/19 1100)  Bed to Chair: Moderate assistance;Assist x2 (12/13/19 0524)  Scooting: Maximum assistance(can scoot R side fairly) (12/16/19 1100)     Arian Dixon Fall Risk Assessment:  Arian Dixon Fall Risk  Mobility: Ambulates or transfers with assist devices or assistance (12/15/19 2000)  Mobility Interventions: Assess mobility with egress test;Patient to call before getting OOB (12/15/19 2000)  Mentation: Alert, oriented x 3 (12/15/19 2000)  Mentation Interventions: Bed/chair exit alarm; Door open when patient unattended (12/15/19 2000)  Medication: Patient receiving anticonvulsants, sedatives(tranquilizers), psychotropics or hypnotics, hypoglycemics, narcotics, sleep aids, antihypertensives, laxatives, or diuretics (12/15/19 2000)  Medication Interventions: Bed/chair exit alarm; Patient to call before getting OOB (12/15/19 2000)  Elimination: Needs assistance with toileting (12/15/19 2000)  Elimination Interventions: Call light in reach; Patient to call for help with toileting needs (12/15/19 2000)  Prior Fall History: Before admission in past 12 months _home or previous inpatient care) (12/15/19 2000)  History of Falls Interventions: Door open when patient unattended;Evaluate medications/consider consulting pharmacy (12/15/19 2000)  Total Score: 4 (12/15/19 2000)  Standard Fall Precautions: Yes (12/12/19 0018)  High Fall Risk: Yes (12/15/19 2000)     Speech Assessment:         Ambulation:  Gait  Base of Support: Center of gravity altered;Narrowed; Shift to right (12/13/19 7259)  Speed/Clotilde: Delayed; Shuffled; Slow (12/13/19 6326)  Step Length: Left shortened;Right shortened (12/13/19 7960)  Gait Abnormalities: Decreased step clearance; Hemiplegic;Trunk sway increased (12/13/19 0015)  Ambulation - Level of Assistance:  Moderate assistance;Assist x2 (12/13/19 0852)  Distance (ft): 5 Feet (ft)(side steps edge of bed) (12/13/19 0852)  Assistive Device: Katt Barber, rolling;Gait belt (12/13/19 0852)     Labs/Studies:  Recent Results (from the past 72 hour(s))   GLUCOSE, POC    Collection Time: 12/13/19  3:58 PM   Result Value Ref Range    Glucose (POC) 139 (H) 65 - 100 mg/dL   PLEASE READ & DOCUMENT PPD TEST IN 24 HRS    Collection Time: 12/13/19  5:32 PM   Result Value Ref Range    PPD Negative Negative    mm Induration 0 0 - 5 mm   GLUCOSE, POC    Collection Time: 12/13/19  9:32 PM   Result Value Ref Range    Glucose (POC) 178 (H) 65 - 100 mg/dL   GLUCOSE, POC    Collection Time: 12/14/19  7:14 AM   Result Value Ref Range    Glucose (POC) 219 (H) 65 - 100 mg/dL   GLUCOSE, POC    Collection Time: 12/14/19 11:41 AM   Result Value Ref Range    Glucose (POC) 207 (H) 65 - 100 mg/dL   GLUCOSE, POC    Collection Time: 12/14/19  4:23 PM   Result Value Ref Range    Glucose (POC) 192 (H) 65 - 100 mg/dL   PLEASE READ & DOCUMENT PPD TEST IN 48 HRS    Collection Time: 12/14/19  5:10 PM   Result Value Ref Range    PPD Negative Negative    mm Induration 0 0 - 5 mm   GLUCOSE, POC    Collection Time: 12/14/19  9:28 PM   Result Value Ref Range    Glucose (POC) 143 (H) 65 - 100 mg/dL   GLUCOSE, POC    Collection Time: 12/15/19  7:20 AM   Result Value Ref Range    Glucose (POC) 173 (H) 65 - 100 mg/dL   GLUCOSE, POC    Collection Time: 12/15/19 11:30 AM   Result Value Ref Range    Glucose (POC) 192 (H) 65 - 100 mg/dL   GLUCOSE, POC    Collection Time: 12/15/19  4:13 PM   Result Value Ref Range    Glucose (POC) 144 (H) 65 - 100 mg/dL   PLEASE READ & DOCUMENT PPD TEST IN 72 HRS    Collection Time: 12/15/19  7:10 PM   Result Value Ref Range    PPD Negative Negative    mm Induration 0 0 - 5 mm   GLUCOSE, POC    Collection Time: 12/16/19  1:55 AM   Result Value Ref Range    Glucose (POC) 144 (H) 65 - 100 mg/dL   GLUCOSE, POC    Collection Time: 12/16/19  7:13 AM   Result Value Ref Range    Glucose (POC) 162 (H) 65 - 100 mg/dL   GLUCOSE, POC    Collection Time: 12/16/19 11:28 AM   Result Value Ref Range    Glucose (POC) 104 (H) 65 - 100 mg/dL       Assessment:     Problem List as of 12/16/2019 Date Reviewed: 3/3/2017          Codes Class Noted - Resolved    Arrhythmia ICD-10-CM: I49.9  ICD-9-CM: 427.9  12/15/2019 - Present        Hyperlipemia ICD-10-CM: E78.5  ICD-9-CM: 272.4  12/15/2019 - Present        * (Principal) Acute ischemic stroke ([facility] ICD-10-CM: I63.9  ICD-9-CM: 434.91  12/12/2019 - Present        Microcytic anemia ICD-10-CM: D50.9  ICD-9-CM: 280.9  11/20/2014 - Present        Acute pancreatitis ICD-10-CM: K85.90  ICD-9-CM: 577.0  11/19/2014 - Present        GERD (gastroesophageal reflux disease) (Chronic) ICD-10-CM: K21.9  ICD-9-CM: 530.81  11/19/2014 - Present        Hypertension (Chronic) ICD-10-CM: I10  ICD-9-CM: 401.9  Unknown - Present        Diabetes (HCC) (Chronic) ICD-10-CM: E11.9  ICD-9-CM: 250.00  Unknown - Present              Plan:     Acute CVA -  left  hemiplegia  - cardiology following for PFO/ R-L shunt  - aspirin, lipitor, risk factor management. - Continue acute PT, OT to focus on left sided neuro deficits on acute floor. Continue gait training, transfer training, balance activities, WC mobility at acute floor.    - ST to continue to evaluate/treat for dysphagia. - Patient will need skilled rehab. [facility] following.        Signed By: Bassam Roy MD     December 16, 2019

## 2019-12-16 NOTE — PROGRESS NOTES
TRANSFER - OUT REPORT:    Verbal report given to Susan Dodson RN (name) on Daina Pinzon  Returning to room 705(unit) for routine progression of care       Report consisted of patients Situation, Background, Assessment and   Recommendations(SBAR). Information from the following report(s) Procedure Summary was reviewed with the receiving nurse. Lines:   Peripheral IV 12/12/19 Left Antecubital (Active)   Site Assessment Clean, dry, & intact 12/16/2019  8:00 AM   Phlebitis Assessment 0 12/16/2019  8:00 AM   Infiltration Assessment 0 12/16/2019  8:00 AM   Dressing Status Clean, dry, & intact 12/16/2019  8:00 AM   Dressing Type Tape;Transparent 12/16/2019  8:00 AM   Hub Color/Line Status Pink 12/16/2019  8:00 AM        Opportunity for questions and clarification was provided.       Patient transported with:   hospital transport

## 2019-12-16 NOTE — INTERDISCIPLINARY ROUNDS
Interdisciplinary team rounds were held 12/16/2019 with the following team members:Care Management, Physical Therapy and . Plan of care discussed. See clinical pathway and/or care plan for interventions and desired outcomes.

## 2019-12-16 NOTE — PROGRESS NOTES
Hospitalist Note     Admit Date:  2019  9:07 AM   Name:  Jonathon Moss   Age:  53 y.o.  :  1975   MRN:  [id number]   PCP:  Myrna Jackson MD      HPI/Subjective:      52 y/o smoker with DM, HTN, who presented to ER  with L leg and arm weakness, L facial droop, dysarthria. First noted L leg weakness late night  when he woke up to go to the bathroom. He was normal when he went to bed around 1030. Woke up and had persistent weakness L leg and also now noted in L arm. EMS called. Noted to have slurred speech and L facial droop as well. Code S called in ER around 9am.  MRI with acute infarcts in L cerebellar hemisphere, deep frontoparietal white matter, and R paramedian yariel. Also noted old lacunar infarcts. No large vessel occlusion on CTA, but some stenosis noted. No hx afib, TIA, CVA. No CP, palpitations, SOB. Today, no ac events, tolerated po, no new neuro spx or improvement, no changes, ISMAEL today    Objective:     Patient Vitals for the past 24 hrs:   Temp Pulse Resp BP SpO2   19 0800 98.1 °F (36.7 °C) 75 18 159/89 90 %   19 0400 98.4 °F (36.9 °C) 74 17 (!) 157/94 91 %   19 0000 98.3 °F (36.8 °C) 70 17 146/79 92 %   12/15/19 2000 98.2 °F (36.8 °C) 73 18 148/89 92 %   12/15/19 1600 98.7 °F (37.1 °C) 71 18 137/86 92 %   12/15/19 1200 98.8 °F (37.1 °C) 63 18 147/89 93 %     Oxygen Therapy  O2 Sat (%): 90 % (19 0800)  Pulse via Oximetry: 95 beats per minute (19 1022)  No intake or output data in the 24 hours ending 19 1107    Physical Exam:  General:    Well nourished. Alert and oriented. CV:   RRR. No murmur, rub, or gallop. Lungs:  Clear to auscultation bilaterally. No wheezing, rhonchi, or rales  Extremities: Warm and dry. No cyanosis or edema. Neurologic: CN II-XII intact except for mild L facial droop. Sensation intact. Finger to nose intact. PERRLA. Mild slurred speech. No confusion. STR 1/5 LUE and LLE.   Skin:     No rashes or jaundice. No wounds. Psych:  Normal mood and affect. I reviewed the labs, imaging, EKGs, telemetry, and other studies done this admission.   Data Review:   Recent Results (from the past 24 hour(s))   GLUCOSE, POC    Collection Time: 12/15/19 11:30 AM   Result Value Ref Range    Glucose (POC) 192 (H) 65 - 100 mg/dL   GLUCOSE, POC    Collection Time: 12/15/19  4:13 PM   Result Value Ref Range    Glucose (POC) 144 (H) 65 - 100 mg/dL   PLEASE READ & DOCUMENT PPD TEST IN 72 HRS    Collection Time: 12/15/19  7:10 PM   Result Value Ref Range    PPD Negative Negative    mm Induration 0 0 - 5 mm   GLUCOSE, POC    Collection Time: 12/16/19  1:55 AM   Result Value Ref Range    Glucose (POC) 144 (H) 65 - 100 mg/dL   GLUCOSE, POC    Collection Time: 12/16/19  7:13 AM   Result Value Ref Range    Glucose (POC) 162 (H) 65 - 100 mg/dL       All Micro Results     None          Current Facility-Administered Medications   Medication Dose Route Frequency    metoprolol tartrate (LOPRESSOR) tablet 100 mg  100 mg Oral BID    insulin glargine (LANTUS) injection 12 Units  12 Units SubCUTAneous DAILY    insulin lispro (HUMALOG) injection 5 Units  5 Units SubCUTAneous TIDAC    magnesium oxide (MAG-OX) tablet 400 mg  400 mg Oral BID    hydrALAZINE (APRESOLINE) 20 mg/mL injection 20 mg  20 mg IntraVENous Q4H PRN    atorvastatin (LIPITOR) tablet 80 mg  80 mg Oral QHS    amLODIPine (NORVASC) tablet 10 mg  10 mg Oral DAILY    cloNIDine HCl (CATAPRES) tablet 0.1 mg  0.1 mg Oral Q4H PRN    lisinopril (PRINIVIL, ZESTRIL) tablet 40 mg  40 mg Oral DAILY    sodium chloride (NS) flush 5-40 mL  5-40 mL IntraVENous Q8H    sodium chloride (NS) flush 5-40 mL  5-40 mL IntraVENous PRN    ondansetron (ZOFRAN) injection 4 mg  4 mg IntraVENous Q4H PRN    aspirin chewable tablet 81 mg  81 mg Oral DAILY    acetaminophen (TYLENOL) tablet 650 mg  650 mg Oral Q4H PRN    polyethylene glycol (MIRALAX) packet 17 g  17 g Oral DAILY PRN    enoxaparin (LOVENOX) injection 40 mg  40 mg SubCUTAneous Q24H    insulin lispro (HUMALOG) injection   SubCUTAneous AC&HS    dextrose 40% (GLUTOSE) oral gel 1 Tube  15 g Oral PRN    glucagon (GLUCAGEN) injection 1 mg  1 mg IntraMUSCular PRN    dextrose (D50W) injection syrg 12.5-25 g  25-50 mL IntraVENous PRN       Other Studies:  No results found. Results for orders placed or performed during the hospital encounter of 19   2D ECHO COMPLETE ADULT (TTE) [address]  [address] [facility], [address] [facility]  [phone]    Transthoracic Echocardiogram  2D, M-mode, Doppler, and Color Doppler    Patient: Fer Leyva  MR #: [id number]  : 1975  Age: 53 years  Gender: Male  Study date: 12-Dec-2019  Account #: [de-identified]  Height: 66 in  Weight: 209.7 lb  BSA: 2.04 mï¾²  Status:Routine  Location: ER03  BP: 133/ 76    Allergies: NO KNOWN ALLERGENS    Sonographer:  [facility]  Group:  [facility]  Referring Physician:  Gretchen Blizzard. Kurtis Schmidt MD  Reading Physician:  Axel Kay MD [facility]    INDICATIONS: CVA    PROCEDURE: This was a routine study. A transthoracic echocardiogram was  performed. The study included complete 2D imaging, M-mode, complete spectral  Doppler, and color Doppler. Intravenous contrast (Definity) was administered. Image quality was adequate. LEFT VENTRICLE: Size was normal. Systolic function was normal. Ejection  fraction was estimated in the range of 65 % to 70 %. There were no regional  wall motion abnormalities. There was mild concentric hypertrophy. Left  ventricular diastolic function parameters were normal. Avg E/e': 11.05.    RIGHT VENTRICLE: The size was normal. Systolic function was normal. The  tricuspid jet envelope definition was inadequate for estimation of RV   systolic  pressure.     LEFT ATRIUM: Size was normal.    ATRIAL SEPTUM: Agitated saline contrast injection (bubble study) was performed. There was a right-to-left shunt, in the baseline state. RIGHT ATRIUM: Size was normal.    SYSTEMIC VEINS: IVC: The inferior vena cava was normal in size and course. The  respirophasic change in diameter was more than 50%. AORTIC VALVE: The valve was trileaflet. Leaflets exhibited mild sclerosis. There was no evidence for stenosis. There was no insufficiency. MITRAL VALVE: Valve structure was normal. There was no evidence for stenosis. There was no regurgitation. TRICUSPID VALVE: The valve structure was normal. There was no evidence for  stenosis. There was trivial regurgitation. PULMONIC VALVE: Not well visualized. There was no evidence for stenosis. There  was no insufficiency. PERICARDIUM: There was no pericardial effusion. AORTA: The root exhibited normal size. SUMMARY:    -  Left ventricle: Systolic function was normal. Ejection fraction was  estimated in the range of 65 % to 70 %. There were no regional wall motion  abnormalities. There was mild concentric hypertrophy. -  Atrial septum: Agitated saline contrast injection (bubble study) was  performed. There was a right-to-left shunt, in the baseline state. -  Inferior vena cava, hepatic veins: The respirophasic change in diameter   was  more than 50%. SYSTEM MEASUREMENT TABLES    2D mode  AoR Diam (2D): 2.8 cm  LA Dimension (2D): 3.5 cm  Left Atrium Systolic Volume Index; Method of Disks, Biplane; 2D mode;: 26   ml/m2  IVS/LVPW (2D): 1  IVSd (2D): 1.2 cm  LVIDd (2D): 5.2 cm  LVIDs (2D): 2.9 cm  LVOT Area (2D): 2.3 cm2  LVPWd (2D): 1.2 cm  RVIDd (2D): 2.2 cm    Tissue Doppler Imaging  LV Peak Early Thomas Tissue Rc; Lateral MA (TDI): 8.1 cm/s  LV Peak Early Thomas Tissue Rc; Medial MA (TDI): 7.6 cm/s    Unspecified Scan Mode  Peak Grad; Mean; Antegrade Flow: 14 mm[Hg]  Vmax;  Antegrade Flow: 185 cm/s  LVOT Diam: 1.7 cm  MV Peak Rc/LV Peak Tissue Rc E-Wave; Lateral MA: 10.7  MV Peak Rc/LV Peak Tissue Rc E-Wave; Medial MA: 11.4    Prepared and signed by    Stacia Miller. Dolores Palomares MD [facility]  Signed 12-Dec-2019 15:46:58             Assessment and Plan:     Dx:  1- Acute embolic stroke, peristent weakness on left side.  Pt has PFO w/ large shunt  2- Hypertension, controlled  3- Hypokalemia and hypomagnesemia, replaced    Rx:  ASA  Statin and Omega 3  Neuro checks  Follow cardiology input  Neuro follow    Dispo; need rehab, no insurance?, CM following    Signed:  Nikita Ashton MD

## 2019-12-16 NOTE — PROCEDURES
Brief Cardiac Procedure Note    Patient: Henry Smiley MRN: [id number]  SSN: [id number]    YOB: 1975  Age: 53 y.o. Sex: male      Date of Procedure: 12/16/2019     Pre-procedure Diagnosis: Cardiac Source of Embolism    Post-procedure Diagnosis: PFO    Procedure: Transesophageal Echocardiogram    Brief Description of Procedure: As above    Performed By: Christiane Li MD     Assistants: None    Anesthesia: Moderate Sedation    Estimated Blood Loss: Less than 10 mL      Specimens: None    Implants: None    Findings: PFO with 3 mm separation of septum primum with septum secondum. Large shunt > 30 bubbles. Complications: None    Recommendations: 4 week event monitor at discharge then see me in office to discuss PFO closure.      Signed By: Christiane Li MD     December 16, 2019

## 2019-12-16 NOTE — PROGRESS NOTES
12/16/19 0732   NIH Stroke Scale   Interval Other (comment)  (Dual NIH w/ Ether Evan)   LOC 0   LOC Questions 0   LOC Commands 0   Best Gaze 0   Visual 0   Facial Palsy 2   Motor Right Arm 0   Motor Left Arm 3   Motor Right Leg 0   Motor Left Leg 4   Limb Ataxia 0   Sensory 0   Best Language 1   Dysarthria 0   Extinction and Inattention 0   Total 10

## 2019-12-16 NOTE — PROGRESS NOTES
ISMAEL Dr Shabnam Grossman  ASA II Mallampati II  Versed 1mg  Fentanyl 25mcg  Pt tolerated procedure well

## 2019-12-16 NOTE — PROGRESS NOTES
SPEECH PATHOLOGY NOTE:    Attempted to see patient, however currently NPO for scheduled procedure. Will check back at later time/date.        Layla Riggins [address] CCC-SLP

## 2019-12-16 NOTE — PROGRESS NOTES
Problem: Mobility Impaired (Adult and Pediatric)  Goal: *Acute Goals and Plan of Care  Description  STG:  (1.) Mr. Annie Sykes will move from supine to sit and sit to supine , scoot up and down and roll side to side with MINIMAL ASSIST within 3 treatment day(s). (2.) Mr. Annie Sykes will transfer from bed to chair and chair to bed with MODERATE ASSIST using the least restrictive device within 3 treatment day(s). (3.) Mr. Annie Sykes will perform standing static and dynamic balance activities x 10 minutes with MODERATE ASSISTx1 to improve safety within 3 treatment day(s). LTG:  (1.) Mr. Annie Sykes will move from supine to sit and sit to supine , scoot up and down and roll side to side with CONTACT GUARD ASSIST within 7 treatment day(s). (2.) Mr. Annie Sykes will transfer from bed to chair and chair to bed with MINIMAL ASSIST using the least restrictive device within 7 treatment day(s). (3.) Mr. Annie Sykes will ambulate with MODERATE ASSIST for 50+ feet with the least restrictive device within 7 treatment day(s). (4.) Mr. Annie Sykes will perform standing static and dynamic balance activities x 20 minutes with MINIMAL ASSIST to improve safety within 7 treatment day(s). (5.) Mr. Annie Sykes will perform bilateral lower extremity exercises x 15 min for HEP with SUPERVISION to improve strength, endurance, and functional mobility within 7 treatment day(s).      PHYSICAL THERAPY: Daily Note and AM 12/16/2019  INPATIENT: PT Visit Days : 2  Payor: MEDICAID PENDING / Plan: Kalyn Harrington PENDING / Product Type: Medicaid /       NAME/AGE/GENDER: Jade Monge is a 53 y.o. male   PRIMARY DIAGNOSIS: Acute ischemic stroke ([address] [I63.9]  Cerebrovascular accident (CVA) due to embolism ([address] [I63.9] Acute ischemic stroke ([address] Acute ischemic stroke ([address]       ICD-10: Treatment Diagnosis:   · Other lack of cordination (R27.8)  · Difficulty in walking, Not elsewhere classified (R26.2)  · Other abnormalities of gait and mobility (R26.89)  · Unspecified Lack of Coordination (R27.9)  · Hemiplegia and hemiparesis following cerebral infarction affecting   · left non-dominant side (K96.027)   Precaution/Allergies:  Patient has no known allergies. ASSESSMENT:     Mr. Krys Grimaldo is a 53year old M who presents to hospital with L sided facial droop and LUE and LLE weakness. MRI indicated acute infarcts in L cerebellar hemisphere, deep frontoparietal white matter and R paramedian yariel. Prior to hospital admission pt lives with his wife in a one story apartment on the second level with 12 steps to access. Pt endorses no falls in past 6 months. Prior to admission Mr. Krys Grimaldo uses no DME for mobility. He reports he had been working full time but got laid off, so now he works just a few days at [employer], which requires him being on his feet. Upon entering, pt resting in bed, wife at bedside and agreeable to therapy with note able speech impairment. He needs moderate assist to roll L and total assist to roll R. He sat up to the EOB from L sidelying with maximal assist.  He worked on his static sitting balance then his dynamic sitting balance and was able to maintain some of the time with just verbal cues. He practiced sit to stand with luke walker on R 3 times getting better each time at finding his balance and at one point needed just a L knee block to stand unsupported. Good progress and will need aggressive therapy at [facility] if possible to maximize his rehab potential for all therapies. This section established at most recent assessment   PROBLEM LIST (Impairments causing functional limitations):  1. Decreased Strength  2. Decreased ADL/Functional Activities  3. Decreased Transfer Abilities  4. Decreased Ambulation Ability/Technique  5. Decreased Balance  6. Increased Pain  7. Decreased Activity Tolerance  8. Increased Fatigue  9.  Decreased Flexibility/Joint Mobility   INTERVENTIONS PLANNED: (Benefits and precautions of physical therapy have been discussed with the patient.)  1. Balance Exercise  2. Bed Mobility  3. Family Education  4. Gait Training  5. Home Exercise Program (HEP)  6. Manual Therapy  7. Neuromuscular Re-education/Strengthening  8. Range of Motion (ROM)  9. Therapeutic Activites  10. Therapeutic Exercise/Strengthening  11. Transfer Training     TREATMENT PLAN: Frequency/Duration: 3 times a week for duration of hospital stay  Rehabilitation Potential For Stated Goals: [facility] (at time of discharge pending progress):    Placement: It is my opinion, based on this patient's performance to date, that Mr. Patty Brown may benefit from intensive therapy at an [address] [place] [address] after discharge due to a probable need for 24 hour rehab nursing, a probable need for multiple therapy disciplines, and potential to make ongoing and sustainable functional improvement that is of practical value. .  Equipment:    TBD pending progress with therapy. HISTORY:   History of Present Injury/Illness (Reason for Referral):  Per H&P: \"Pt is a 54 y/o smoker with DM, HTN, who presented to ER with L leg and arm weakness, L facial droop, dysarthria. First noted L leg weakness late night 12/11 when he woke up to go to the bathroom. He was normal when he went to bed around 1030. Woke up this morning and had persistent weakness L leg and also now noted in L arm. EMS called. Noted to have slurred speech and L facial droop as well. Code S called in ER around 9am.  MRI with acute infarcts in L cerebellar hemisphere, deep frontoparietal white matter, and R paramedian yariel. Also noted old lacunar infarcts. No large vessel occlusion on CTA, but some stenosis noted. No hx afib, TIA, CVA. No CP, palpitations, SOB. \"  Past Medical History/Comorbidities:   Mr. Patty Brown  has a past medical history of Acute ischemic stroke ([facility] [address] (12/12/2019), Acute pancreatitis (11/19/2014), Cerebrovascular accident (CVA) due to embolism ([address] (12/12/2019), Diabetes ([facility] [address] (2002), Diabetes ([facility] [address] Diabetes mellitus, and Hypertension. He also has no past medical history of Arthritis, Asthma, Autoimmune disease ([address] CAD (coronary artery disease), Cancer ([address] Chronic kidney disease, COPD, Dementia, Dementia ([address] Heart failure ([address] Ill-defined condition, Infectious disease, Liver disease, Other ill-defined conditions(799.89), Psychiatric disorder, PUD (peptic ulcer disease), Seizures ([address] or Sleep disorder. Mr. Wendy Fischer  has a past surgical history that includes hx hernia repair and hx orthopaedic. Social History/Living Environment:   Home Environment: Apartment  # Steps to Enter: 12  Rails to Enter: Yes  [employer] : Bilateral  One/Two Story Residence: One story  Living Alone: No  Support Systems: Spouse/Significant Other/Partner  Patient Expects to be Discharged to[de-identified] Unknown  Current DME Used/Available at Home: None  Tub or Shower Type: Tub/Shower combination  Prior Level of Function/Work/Activity:  Independent, lives with wife in 2nd story 1 level apartment. No recent falls. Number of Personal Factors/Comorbidities that affect the Plan of Care: 1-2: MODERATE COMPLEXITY   EXAMINATION:   Most Recent Physical Functioning:   Gross Assessment:                  Posture:     Balance:  Sitting - Static: Fair (occasional)  Sitting - Dynamic: Fair (occasional)  Standing - Static: Constant support;Poor Bed Mobility:  Supine to Sit: Maximum assistance;Assist x2  Sit to Supine: Maximum assistance;Assist x2  Scooting: Maximum assistance(can scoot R side fairly)  Wheelchair Mobility:     Transfers:  Sit to Stand: Moderate assistance;Assist x2  Stand to Sit: Moderate assistance;Assist x2  Gait:            Body Structures Involved:  1. Nerves  2. Voice/Speech  3. Bones  4. Joints  5. Muscles Body Functions Affected:  1. Mental  2. Sensory/Pain  3. Neuromusculoskeletal  4. Movement Related Activities and Participation Affected:  1.  General Tasks and Demands  2. Mobility  3. Self Care  4. Interpersonal Interactions and Relationships   Number of elements that affect the Plan of Care: 4+: HIGH COMPLEXITY   CLINICAL PRESENTATION:   Presentation: Evolving clinical presentation with changing clinical characteristics: MODERATE COMPLEXITY   CLINICAL DECISION MAKIN [facility] Inpatient Short Form  How much difficulty does the patient currently have. .. Unable A Lot A Little None   1. Turning over in bed (including adjusting bedclothes, sheets and blankets)? [] 1   [] 2   [x] 3   [] 4   2. Sitting down on and standing up from a chair with arms ( e.g., wheelchair, bedside commode, etc.)   [] 1   [x] 2   [] 3   [] 4   3. Moving from lying on back to sitting on the side of the bed? [] 1   [x] 2   [] 3   [] 4   How much help from another person does the patient currently need. .. Total A Lot A Little None   4. Moving to and from a bed to a chair (including a wheelchair)? [] 1   [x] 2   [] 3   [] 4   5. Need to walk in hospital room? [] 1   [x] 2   [] 3   [] 4   6. Climbing 3-5 steps with a railing? [] 1   [x] 2   [] 3   [] 4   © , Trustees of [address] [place] [address] [address] [address], under license to [employer]. All rights reserved      Score:  Initial: 13 Most Recent: X (Date: -- )    Interpretation of Tool:  Represents activities that are increasingly more difficult (i.e. Bed mobility, Transfers, Gait). Medical Necessity:     · Patient is expected to demonstrate progress in   · strength, range of motion, balance, coordination, and functional technique  ·  to   · increase independence with all mobility. · .  Reason for Services/Other Comments:  · Patient continues to require skilled intervention due to   · medical complications and mobility deficits which impact his level of function, safety, and independence as indicated above.    · .   Use of outcome tool(s) and clinical judgement create a POC that gives a: Questionable prediction of patient's progress: MODERATE COMPLEXITY        TREATMENT:   (In addition to Assessment/Re-Assessment sessions the following treatments were rendered)   Pre-treatment Symptoms/Complaints:  \"ok\"  Pain: Initial:   Pain Intensity 1: 0  Post Session:  No pain reported at end of session, resting comfortably in bed. Therapeutic Activity: (    15 Minutes): Therapeutic activities including bed mobility, rolling, scooting, sit <> stand transfer training  bed to improve mobility, strength, balance, and coordination. Required maximal cues with moderate physical assist   to promote coordination of bilateral, upper extremity(s), lower extremity(s) and promote motor control of bilateral, upper extremity(s), lower extremity(s). Neuromuscular Re-education: ( 14 Minutes):  Sitting static and dynamic balance activities, and static standing balance control training and dynamic standing balance control training to improve balance, coordination, kinesthetic sense, and posture. Required maximal visual, verbal, manual, and tactile cues to promote static and dynamic balance in standing, promote coordination of left, upper extremity(s), lower extremity(s) and promote motor control of left, upper extremity(s), lower extremity(s). Braces/Orthotics/Lines/Etc:   · O2 Device: Room Air   Treatment/Session Assessment:    · Response to Treatment:  see above. Works very hard with therapy. · Interdisciplinary Collaboration:   o Physical Therapy Assistant  o Registered Nurse  o Rehabilitation Attendant  o Certified Nursing Assistant/Patient Care Technician  · After treatment position/precautions:   o Supine in bed  o Bed alarm/tab alert on  o Bed/Chair-wheels locked  o Bed in low position  o Call light within reach  o RN notified  o Family at bedside   · Compliance with Program/Exercises: Compliant all of the time  · Recommendations/Intent for next treatment session:   \"Next visit will focus on advancements to more challenging activities and reduction in assistance provided\".     Total Treatment Duration:  PT Patient Time In/Time Out  Time In: 1035  Time Out: 1110    Marhta Alcantara PTA

## 2019-12-16 NOTE — PROGRESS NOTES
12/15/19 1903   NIH Stroke Scale   Interval Other (comment)  (JERRY Celis)   LOC 0   LOC Questions 0   LOC Commands 0   Best Gaze 0   Visual 0   Facial Palsy 2   Motor Right Arm 0   Motor Left Arm 3   Motor Right Leg 0   Motor Left Leg 4   Limb Ataxia 0   Sensory 0   Best Language 1   Dysarthria 0   Extinction and Inattention 0   Total 10

## 2019-12-17 PROBLEM — Q21.12 PFO (PATENT FORAMEN OVALE): Status: ACTIVE | Noted: 2019-12-17

## 2019-12-17 LAB
GLUCOSE BLD STRIP.AUTO-MCNC: 108 MG/DL (ref 65–100)
GLUCOSE BLD STRIP.AUTO-MCNC: 186 MG/DL (ref 65–100)
GLUCOSE BLD STRIP.AUTO-MCNC: 63 MG/DL (ref 65–100)
GLUCOSE BLD STRIP.AUTO-MCNC: 97 MG/DL (ref 65–100)
GLUCOSE BLD STRIP.AUTO-MCNC: 98 MG/DL (ref 65–100)

## 2019-12-17 PROCEDURE — 74011250636 HC RX REV CODE- 250/636: Performed by: INTERNAL MEDICINE

## 2019-12-17 PROCEDURE — 82962 GLUCOSE BLOOD TEST: CPT

## 2019-12-17 PROCEDURE — 74011636637 HC RX REV CODE- 636/637: Performed by: INTERNAL MEDICINE

## 2019-12-17 PROCEDURE — 74011250637 HC RX REV CODE- 250/637: Performed by: HOSPITALIST

## 2019-12-17 PROCEDURE — 74011636637 HC RX REV CODE- 636/637: Performed by: HOSPITALIST

## 2019-12-17 PROCEDURE — 77030020263 HC SOL INJ SOD CL0.9% LFCR 1000ML

## 2019-12-17 PROCEDURE — 65660000000 HC RM CCU STEPDOWN

## 2019-12-17 PROCEDURE — 92523 SPEECH SOUND LANG COMPREHEN: CPT

## 2019-12-17 PROCEDURE — 74011250637 HC RX REV CODE- 250/637: Performed by: INTERNAL MEDICINE

## 2019-12-17 PROCEDURE — 92526 ORAL FUNCTION THERAPY: CPT

## 2019-12-17 RX ORDER — CODEINE PHOSPHATE AND GUAIFENESIN 10; 100 MG/5ML; MG/5ML
10 SOLUTION ORAL
Status: DISCONTINUED | OUTPATIENT
Start: 2019-12-17 | End: 2019-12-21

## 2019-12-17 RX ADMIN — INSULIN LISPRO 5 UNITS: 100 INJECTION, SOLUTION INTRAVENOUS; SUBCUTANEOUS at 12:39

## 2019-12-17 RX ADMIN — INSULIN LISPRO 2 UNITS: 100 INJECTION, SOLUTION INTRAVENOUS; SUBCUTANEOUS at 12:40

## 2019-12-17 RX ADMIN — Medication 400 MG: at 18:05

## 2019-12-17 RX ADMIN — GUAIFENESIN AND CODEINE PHOSPHATE 10 ML: 10; 100 LIQUID ORAL at 21:57

## 2019-12-17 RX ADMIN — INSULIN GLARGINE 12 UNITS: 100 INJECTION, SOLUTION SUBCUTANEOUS at 08:36

## 2019-12-17 RX ADMIN — INSULIN LISPRO 5 UNITS: 100 INJECTION, SOLUTION INTRAVENOUS; SUBCUTANEOUS at 08:35

## 2019-12-17 RX ADMIN — METOPROLOL TARTRATE 100 MG: 50 TABLET, FILM COATED ORAL at 08:34

## 2019-12-17 RX ADMIN — AMLODIPINE BESYLATE 10 MG: 10 TABLET ORAL at 08:34

## 2019-12-17 RX ADMIN — LISINOPRIL 40 MG: 20 TABLET ORAL at 08:34

## 2019-12-17 RX ADMIN — Medication 5 ML: at 14:36

## 2019-12-17 RX ADMIN — Medication 400 MG: at 08:34

## 2019-12-17 RX ADMIN — GUAIFENESIN AND CODEINE PHOSPHATE 10 ML: 10; 100 LIQUID ORAL at 18:16

## 2019-12-17 RX ADMIN — Medication 5 ML: at 21:58

## 2019-12-17 RX ADMIN — Medication 5 ML: at 05:17

## 2019-12-17 RX ADMIN — ENOXAPARIN SODIUM 40 MG: 40 INJECTION SUBCUTANEOUS at 14:36

## 2019-12-17 RX ADMIN — ATORVASTATIN CALCIUM 80 MG: 80 TABLET, FILM COATED ORAL at 21:57

## 2019-12-17 RX ADMIN — METOPROLOL TARTRATE 100 MG: 50 TABLET, FILM COATED ORAL at 18:05

## 2019-12-17 RX ADMIN — GUAIFENESIN AND CODEINE PHOSPHATE 10 ML: 10; 100 LIQUID ORAL at 14:36

## 2019-12-17 RX ADMIN — ASPIRIN 81 MG 81 MG: 81 TABLET ORAL at 08:34

## 2019-12-17 NOTE — PROGRESS NOTES
Hospitalist Note     Admit Date:  2019  9:07 AM   Name:  Fernanda Vieyra   Age:  53 y.o.  :  1975   MRN:  [id number]   PCP:  Alexa Trammell MD      HPI/Subjective:      52 y/o smoker with DM, HTN, who presented to ER  with L leg and arm weakness, L facial droop, dysarthria. First noted L leg weakness late night  when he woke up to go to the bathroom. He was normal when he went to bed around 1030. Woke up and had persistent weakness L leg and also now noted in L arm. EMS called. Noted to have slurred speech and L facial droop as well. Code S called in ER around 9am.  MRI with acute infarcts in L cerebellar hemisphere, deep frontoparietal white matter, and R paramedian yariel. Also noted old lacunar infarcts. No large vessel occlusion on CTA, but some stenosis noted. No hx afib, TIA, CVA. No CP, palpitations, SOB. Today, no ac events, tolerated po, no new neuro spx or improvement, no changes, some dry cough, no SOB    Objective:     Patient Vitals for the past 24 hrs:   Temp Pulse Resp BP SpO2   19 1200 98.3 °F (36.8 °C) 72 18 133/80 90 %   19 0800 98.5 °F (36.9 °C) 68 18 160/81 93 %   19 0400 98.6 °F (37 °C) 67 18 142/90 92 %   19 0000 98.4 °F (36.9 °C) 74 18 149/83 91 %   19 2000 98.8 °F (37.1 °C) 82 18 157/75 93 %   19 1633 98.1 °F (36.7 °C) 72 18 128/77 90 %   19 1615  74  131/81 91 %   19 1600  71  122/73 90 %   19 1545  71  123/76 (!) 88 %   19 1530  71  119/73 92 %   19 1519  77  142/82 91 %   19 1517  76  142/85 93 %     Oxygen Therapy  O2 Sat (%): 90 % (19 1200)  Pulse via Oximetry: 73 beats per minute (19 1615)    Intake/Output Summary (Last 24 hours) at 2019 1515  Last data filed at 2019 1958  Gross per 24 hour   Intake    Output 200 ml   Net -200 ml       Physical Exam:  General:    Well nourished. Alert and oriented. CV:   RRR. No murmur, rub, or gallop. Lungs:  Clear to auscultation bilaterally. No wheezing, rhonchi, or rales  Extremities: Warm and dry. No cyanosis or edema. Neurologic: CN II-XII intact except for mild L facial droop. Sensation intact. Finger to nose intact. PERRLA. Mild slurred speech. No confusion. STR 1/5 LUE and LLE. Skin:     No rashes or jaundice. No wounds. Psych:  Normal mood and affect. I reviewed the labs, imaging, EKGs, telemetry, and other studies done this admission.   Data Review:   Recent Results (from the past 24 hour(s))   GLUCOSE, POC    Collection Time: 12/16/19  4:31 PM   Result Value Ref Range    Glucose (POC) 92 65 - 100 mg/dL   GLUCOSE, POC    Collection Time: 12/16/19  9:15 PM   Result Value Ref Range    Glucose (POC) 114 (H) 65 - 100 mg/dL   GLUCOSE, POC    Collection Time: 12/17/19  7:13 AM   Result Value Ref Range    Glucose (POC) 98 65 - 100 mg/dL   GLUCOSE, POC    Collection Time: 12/17/19 11:35 AM   Result Value Ref Range    Glucose (POC) 186 (H) 65 - 100 mg/dL       All Micro Results     None          Current Facility-Administered Medications   Medication Dose Route Frequency    guaiFENesin-codeine (ROBITUSSIN AC) 100-10 mg/5 mL solution 10 mL  10 mL Oral Q4H PRN    metoprolol tartrate (LOPRESSOR) tablet 100 mg  100 mg Oral BID    insulin glargine (LANTUS) injection 12 Units  12 Units SubCUTAneous DAILY    insulin lispro (HUMALOG) injection 5 Units  5 Units SubCUTAneous TIDAC    fentaNYL citrate (PF) injection  mcg   mcg IntraVENous Multiple    midazolam (VERSED) injection 0.5-5 mg  0.5-5 mg IntraVENous Multiple    magnesium oxide (MAG-OX) tablet 400 mg  400 mg Oral BID    hydrALAZINE (APRESOLINE) 20 mg/mL injection 20 mg  20 mg IntraVENous Q4H PRN    atorvastatin (LIPITOR) tablet 80 mg  80 mg Oral QHS    amLODIPine (NORVASC) tablet 10 mg  10 mg Oral DAILY    cloNIDine HCl (CATAPRES) tablet 0.1 mg  0.1 mg Oral Q4H PRN    lisinopril (PRINIVIL, ZESTRIL) tablet 40 mg  40 mg Oral DAILY    sodium chloride (NS) flush 5-40 mL  5-40 mL IntraVENous Q8H    sodium chloride (NS) flush 5-40 mL  5-40 mL IntraVENous PRN    ondansetron (ZOFRAN) injection 4 mg  4 mg IntraVENous Q4H PRN    aspirin chewable tablet 81 mg  81 mg Oral DAILY    acetaminophen (TYLENOL) tablet 650 mg  650 mg Oral Q4H PRN    polyethylene glycol (MIRALAX) packet 17 g  17 g Oral DAILY PRN    enoxaparin (LOVENOX) injection 40 mg  40 mg SubCUTAneous Q24H    insulin lispro (HUMALOG) injection   SubCUTAneous AC&HS    dextrose 40% (GLUTOSE) oral gel 1 Tube  15 g Oral PRN    glucagon (GLUCAGEN) injection 1 mg  1 mg IntraMUSCular PRN    dextrose (D50W) injection syrg 12.5-25 g  25-50 mL IntraVENous PRN       Other Studies:  No results found. Results for orders placed or performed during the hospital encounter of 19   2D ECHO COMPLETE ADULT (TTE) [address]  [address] [facility] [address] [facility]  [phone]    Transthoracic Echocardiogram  2D, M-mode, Doppler, and Color Doppler    Patient: Zachery Monroe  MR #: [id number]  : 1975  Age: 53 years  Gender: Male  Study date: 12-Dec-2019  Account #: [de-identified]  Height: 66 in  Weight: 209.7 lb  BSA: 2.04 mï¾²  Status:Routine  Location: ER03  BP: 133/ 76    Allergies: NO KNOWN ALLERGENS    Sonographer:  LUZ Ames  Group:  [id number] Card[facility]  Referring Physician:  Tarik Sanchez. Srinivasa Thomas MD  Reading Physician:  Jeff Shah MD [facility] [place]    INDICATIONS: CVA    PROCEDURE: This was a routine study. A transthoracic echocardiogram was  performed. The study included complete 2D imaging, M-mode, complete spectral  Doppler, and color Doppler. Intravenous contrast (Definity) was administered. Image quality was adequate. LEFT VENTRICLE: Size was normal. Systolic function was normal. Ejection  fraction was estimated in the range of 65 % to 70 %. There were no regional  wall motion abnormalities.  There was mild concentric hypertrophy. Left  ventricular diastolic function parameters were normal. Avg E/e': 11.05.    RIGHT VENTRICLE: The size was normal. Systolic function was normal. The  tricuspid jet envelope definition was inadequate for estimation of RV   systolic  pressure. LEFT ATRIUM: Size was normal.    ATRIAL SEPTUM: Agitated saline contrast injection (bubble study) was   performed. There was a right-to-left shunt, in the baseline state. RIGHT ATRIUM: Size was normal.    SYSTEMIC VEINS: IVC: The inferior vena cava was normal in size and course. The  respirophasic change in diameter was more than 50%. AORTIC VALVE: The valve was trileaflet. Leaflets exhibited mild sclerosis. There was no evidence for stenosis. There was no insufficiency. MITRAL VALVE: Valve structure was normal. There was no evidence for stenosis. There was no regurgitation. TRICUSPID VALVE: The valve structure was normal. There was no evidence for  stenosis. There was trivial regurgitation. PULMONIC VALVE: Not well visualized. There was no evidence for stenosis. There  was no insufficiency. PERICARDIUM: There was no pericardial effusion. AORTA: The root exhibited normal size. SUMMARY:    -  Left ventricle: Systolic function was normal. Ejection fraction was  estimated in the range of 65 % to 70 %. There were no regional wall motion  abnormalities. There was mild concentric hypertrophy. -  Atrial septum: Agitated saline contrast injection (bubble study) was  performed. There was a right-to-left shunt, in the baseline state. -  Inferior vena cava, hepatic veins: The respirophasic change in diameter   was  more than 50%.     SYSTEM MEASUREMENT TABLES    2D mode  AoR Diam (2D): 2.8 cm  LA Dimension (2D): 3.5 cm  Left Atrium Systolic Volume Index; Method of Disks, Biplane; 2D mode;: 26   ml/m2  IVS/LVPW (2D): 1  IVSd (2D): 1.2 cm  LVIDd (2D): 5.2 cm  LVIDs (2D): 2.9 cm  LVOT Area (2D): 2.3 cm2  LVPWd (2D): 1.2 cm  RVIDd (2D): 2.2 cm    Tissue Doppler Imaging  LV Peak Early Thomas Tissue Rc; Lateral MA (TDI): 8.1 cm/s  LV Peak Early Thomas Tissue Rc; Medial MA (TDI): 7.6 cm/s    Unspecified Scan Mode  Peak Grad; Mean; Antegrade Flow: 14 mm[Hg]  Vmax; Antegrade Flow: 185 cm/s  LVOT Diam: 1.7 cm  MV Peak Rc/LV Peak Tissue Rc E-Wave; Lateral MA: 10.7  MV Peak Rc/LV Peak Tissue Rc E-Wave; Medial MA: 11.4    Prepared and signed by    Meir Cohen. Kaylie German MD [facility]  Signed 12-Dec-2019 15:46:58             Assessment and Plan:     Dx:  1- Acute embolic stroke, peristent weakness on left side.  Pt has PFO w/ large shunt  2- Hypertension, controlled  3- Hypokalemia and hypomagnesemia, replaced    Rx:  ASA  Statin and Omega 3  Neuro checks  Follow cardiology input  Neuro follow  Plan closure of PFO/ shunt 4 wks after DC    Dispo; need rehab, no insurance?, CM following    Signed:  Julia Valdez MD

## 2019-12-17 NOTE — PROGRESS NOTES
12/16/19 1940   NIH Stroke Scale   Interval Other (comment)   LOC 0   LOC Questions 0   LOC Commands 0   Best Gaze 0   Visual 0   Facial Palsy 2   Motor Right Arm 0   Motor Left Arm 3   Motor Right Leg 0   Motor Left Leg 4   Limb Ataxia 0   Sensory 0   Best Language 1   Dysarthria 0   Extinction and Inattention 0   Total 10   NIH at bedside with Irvin Jenkins RN

## 2019-12-17 NOTE — PROGRESS NOTES
Spiritual Care Visit, initial visit. Visited with patient from doorway. Offered encouragement to patient and wife. .    Visit by Siomara Carrillo, Staff .  Gaudencio., Lorri.CHULA., B.A.

## 2019-12-17 NOTE — PROGRESS NOTES
[facility] CARDIOLOGY PROGRESS NOTE           12/17/2019 9:33 AM    Admit Date: 12/12/2019      Subjective:   Patient with persistent left sided weakness. No chest pain. ISMAEL yesterday with sizable PFO. Telemetry without atrial fibrillation    ROS:  Cardiovascular:  As noted above    Objective:      Vitals:    12/16/19 2000 12/17/19 0000 12/17/19 0400 12/17/19 0800   BP: 157/75 149/83 142/90 160/81   Pulse: 82 74 67 68   Resp: 18 18 18 18   Temp: 98.8 °F (37.1 °C) 98.4 °F (36.9 °C) 98.6 °F (37 °C) 98.5 °F (36.9 °C)   SpO2: 93% 91% 92% 93%   Weight:       Height:           Physical Exam:  General-No Acute Distress  Neck- supple, no JVD  CV- regular rate and rhythm no MRG  Lung- clear bilaterally  Abd- soft, nontender, nondistended  Ext- no edema bilaterally. Skin- warm and dry      Data Review:   No results for input(s): NA, K, MG, BUN, CREA, GLU, WBC, HGB, HCT, PLT, INR, TRIGL, LDL, HDL, HGBEXT, HCTEXT, PLTEXT, INREXT in the last 72 hours. No lab exists for component: TROPI, TCHOL   No results found for: DARA Cheema    Assessment/Plan:     Principal Problem:    Acute ischemic stroke ([facility] [address] (12/12/2019)    S/P CVA. Multiple embolic events. Active Problems:    Hypertension ()    BP acceptable with recent CVA. Avoid hypotension. Diabetes ([facility] [address] ()    Defer management to primary team.          Arrhythmia (12/15/2019)    Brief run of wide complex tachycardia. No recurrence. Normal EF. Plan 4 week event monitor at discharge. REmain on telemetry in hospital.        Hyperlipemia (12/15/2019)    On statin therapy. PFO (patent foramen ovale) (12/17/2019)    PFO with high risk features including significant (3 mm) separation of septum secundum and primum as well as large shunt. Will await recovery of CVA. Plan 4 week event monitor at discharge. Will then see in offfice and if no arrythmias will plan PFO closure. Lower likelihood for afib with normal LA size. Lorelei Tompkins MD  12/17/2019 9:33 AM

## 2019-12-17 NOTE — PROGRESS NOTES
Problem: Patient Education: Go to Patient Education Activity  Goal: Patient/Family Education  Outcome: Progressing Towards Goal     Problem: Pressure Injury - Risk of  Goal: *Prevention of pressure injury  Description  Document Dewey Scale and appropriate interventions in the flowsheet. Outcome: Progressing Towards Goal  Note: Pressure Injury Interventions:  Sensory Interventions: Assess changes in LOC    Moisture Interventions: Absorbent underpads    Activity Interventions: Increase time out of bed    Mobility Interventions: Pressure redistribution bed/mattress (bed type), PT/OT evaluation    Nutrition Interventions: Document food/fluid/supplement intake, Offer support with meals,snacks and hydration    Friction and Shear Interventions: HOB 30 degrees or less                Problem: Patient Education: Go to Patient Education Activity  Goal: Patient/Family Education  Outcome: Progressing Towards Goal     Problem: Falls - Risk of  Goal: *Absence of Falls  Description  Document Jeanne Fall Risk and appropriate interventions in the flowsheet. Outcome: Progressing Towards Goal  Note: Fall Risk Interventions:  Mobility Interventions: Bed/chair exit alarm    Mentation Interventions: Bed/chair exit alarm    Medication Interventions: Bed/chair exit alarm    Elimination Interventions: Call light in reach    History of Falls Interventions: Bed/chair exit alarm         Problem: Patient Education: Go to Patient Education Activity  Goal: Patient/Family Education  Outcome: Progressing Towards Goal     Problem: Diabetes Self-Management  Goal: *Disease process and treatment process  Description  Define diabetes and identify own type of diabetes; list 3 options for treating diabetes. Outcome: Progressing Towards Goal  Goal: *Incorporating nutritional management into lifestyle  Description  Describe effect of type, amount and timing of food on blood glucose; list 3 methods for planning meals.   Outcome: Progressing Towards Goal  Goal: *Incorporating physical activity into lifestyle  Description  State effect of exercise on blood glucose levels. Outcome: Progressing Towards Goal  Goal: *Developing strategies to promote health/change behavior  Description  Define the ABC's of diabetes; identify appropriate screenings, schedule and personal plan for screenings. Outcome: Progressing Towards Goal  Goal: *Using medications safely  Description  State effect of diabetes medications on diabetes; name diabetes medication taking, action and side effects. Outcome: Progressing Towards Goal  Goal: *Monitoring blood glucose, interpreting and using results  Description  Identify recommended blood glucose targets  and personal targets. Outcome: Progressing Towards Goal  Goal: *Prevention, detection, treatment of acute complications  Description  List symptoms of hyper- and hypoglycemia; describe how to treat low blood sugar and actions for lowering  high blood glucose level. Outcome: Progressing Towards Goal  Goal: *Prevention, detection and treatment of chronic complications  Description  Define the natural course of diabetes and describe the relationship of blood glucose levels to long term complications of diabetes.   Outcome: Progressing Towards Goal  Goal: *Developing strategies to address psychosocial issues  Description  Describe feelings about living with diabetes; identify support needed and support network  Outcome: Progressing Towards Goal  Goal: *Insulin pump training  Outcome: Progressing Towards Goal  Goal: *Sick day guidelines  Outcome: Progressing Towards Goal  Goal: *Patient Specific Goal (EDIT GOAL, INSERT TEXT)  Outcome: Progressing Towards Goal     Problem: Patient Education: Go to Patient Education Activity  Goal: Patient/Family Education  Outcome: Progressing Towards Goal     Problem: Patient Education: Go to Patient Education Activity  Goal: Patient/Family Education  Outcome: Progressing Towards Goal     Problem: Nutrition Deficit  Goal: *Optimize nutritional status  Outcome: Progressing Towards Goal     Problem: Patient Education: Go to Patient Education Activity  Goal: Patient/Family Education  Outcome: Progressing Towards Goal     Problem: General Medical Care Plan  Goal: *Vital signs within specified parameters  Outcome: Progressing Towards Goal  Goal: *Labs within defined limits  Outcome: Progressing Towards Goal  Goal: *Absence of infection signs and symptoms  Description  Wash hand more often   Outcome: Progressing Towards Goal  Goal: *Optimal pain control at patient's stated goal  Outcome: Progressing Towards Goal  Goal: *Skin integrity maintained  Outcome: Progressing Towards Goal  Goal: *Fluid volume balance  Outcome: Progressing Towards Goal  Goal: *Optimize nutritional status  Outcome: Progressing Towards Goal  Goal: *Anxiety reduced or absent  Outcome: Progressing Towards Goal  Goal: *Progressive mobility and function (eg: ADL's)  Outcome: Progressing Towards Goal     Problem: Patient Education: Go to Patient Education Activity  Goal: Patient/Family Education  Outcome: Progressing Towards Goal

## 2019-12-17 NOTE — PROGRESS NOTES
Dysphagia Goals:  Description  LTG: Patient will tolerate least restrictive diet without overt signs or symptoms of airway compromise. STG: Patient will tolerate puree diet and thin liquids (no mixed consistencies, no straws) without overt signs or symptoms of airway compromise. Edited 12/13/19  STG: Patient will participate in chewable trials to determine ability to tolerate diet advancement. STG: Patient will participate in modified barium swallow study as clinically indicated.      Outcome: Progressing Towards Goal    Neurolinguistic Goals:  LTG: Patient will increase neuro-linguistic abilities to increase safety and awareness in functional living environment   STG: Patient will participate in speech/language/cognitive evaluation.  MET 12/17/19  STG: Patient will solve mathematical problems with 80%accuracy given minimal cueing   STG: Patient will name 10 items to concrete category in 1 minute given minimal cueing  STG: Patient will participate in verbal reasoning tasks with 80% accuracy given minimal cueing  STG: Patient will complete mental manipulation tasks with 80% accuracy given minimal cueing  STG: Patient will complete working memory/attention tasks with 80% accuracy given minimal cueing  STG: Patient will recall relevant verbal information with 80% accuracy with minimal assistance  STG: Patient will utilize memory compensatory strategies to improve recall of functional list/message with 90%accuracy given minimal assistance  STG: Patient will participate in ongoing cognitive linguistic evaluation for therapeutic assessment     Dysarthria Goals:  LTG: Patient will develop functional and intelligible speech and utilize compensatory strategies to improve communication across environments  STG: Patient will fill in carrier phrase/complete automatic speech tasks with 95% accuracy given minimal cueing  STG: Patient will repeat phrases, sentences with 85% accuracy given minimal cueing  STG: Patient will utilize compensatory strategies across tasks with 90% accuracy given minimal cueing      SPEECH LANGUAGE PATHOLOGY: DYSPHAGIA AND SPEECH-LANGUAGE/COGNITION: Initial Assessment and Daily Note    NAME/AGE/GENDER: Jade Monge is a 53 y.o. male  DATE: 2019  PRIMARY DIAGNOSIS: Acute ischemic stroke [facility]) [I63.9]  Cerebrovascular accident (CVA) due to embolism ([facility] [address] [I63.9]      ICD-10: Treatment Diagnosis: R13.12 Dysphagia, Oropharyngeal Phase  R47.1 Dysarthria and Anarthria  R41.841 Cognitive-Communication Deficit    INTERDISCIPLINARY COLLABORATION: Registered Nurse  PRECAUTIONS/ALLERGIES: Patient has no known allergies. SUBJECTIVE   Pleasant. Wanting chewable food. Patient independent at baseline. Works part time at [employer]. Current Diet puree/thin liquids (no mixed/no straws)     Problem List:  (Impairments causing functional limitations):  1. Oropharyngeal dysphagia  2. Dysarthria  3. Cognitive linguistic impairment    Orientation:   Person  Place  Time  Situation     Pain: Pain Scale 1: Numeric (0 - 10)  Pain Intensity 1: 0    Highest level of education: 12th grade        OBJECTIVE   Dysphagia treatment   Patient seen for po trials and diet tolerance. Significant left sided facial droop. Presented with thin liquids via cup and straw, puree, mechanical soft, mixed, and solids. No overt s/sx airway compromise observed this date with liquid or solids. Serial sips via cup and straw tolerated without overt s/sx and clear vocal quality. Prolonged, slowed oral prep, however functional with adequate oral clearance of right sided residual independently with liquid rinse.          Cognitive Linguistic Evaluation   Patient participated in the following tasks to evaluate speech/cognition linguistic function  Yes/No questions (complex): 8/8  Command followin/5  Sentence repetition: 4/4 (dysarthric)  Divergent naming (concrete category): 7 items in 1 minute  (N>11)  Basic calculations: 2/3 with increased time  Recall:   Immediate 3/4  1min delay 0%  3 min delay 2/4 increasing to 4/4 with multiple choice    Attention: digit repetition 4/6 (impaired at 5 digit level)    Speech intelligibility: moderate dysarthria, impaired nasality, hyponasal  Rate of movement: ~6 in 5 secs (fair)       ASSESSMENT   Dysphagia: patient presents with oral dysphagia, however improved ability to clear residual independently with increased time and liquid rinse. No pharyngeal dysphagia identified this date. Recommend advance diet to mechanical soft (ground meats/chopped vegetables)/thin liquids. Discussed compensatory strategies with patient and his wife. Listed below. Cognitive linguistic/speech: patient presents with moderate dysarthria characterized by impaired nasality, imprecise precision, and decreased rate and cognitive linguistic deficits in attention, working and short term memory, mental calculations, and generative thinking. Attention appeared to impact immediate and short term memory, as well as divergent naming. During divergent naming task, patient became distracted when his wife walked into the room and did not name additional item for about 20 seconds. Basic expressive and receptive language within normal limits. Demonstrated ability to utilize compensatory dysarthria strategies, however needs cues for carryover. Patient currently functioning significantly below baseline and will benefit from ongoing skilled intervention to improve swallow function/strength, cognitive linguistic function, and speech intelligibility.           Tool Used: Dysphagia Outcome and Severity Scale (ROCHELLE)    Score Comments   Normal Diet  [] 7 With no strategies or extra time needed   Functional Swallow  [] 6 May have mild oral or pharyngeal delay   Mild Dysphagia  [] 5 Which may require one diet consistency restricted    Mild-Moderate Dysphagia  [x] 4 With 1-2 diet consistencies restricted   Moderate Dysphagia  [] 3 With 2 or more diet consistencies restricted   Moderate-Severe Dysphagia  [] 2 With partial PO strategies (trials with ST only)   Severe Dysphagia  [] 1 With inability to tolerate any PO safely      Score:  Initial:4 Most Recent: 4 (Date 12/17/19 )   Interpretation of Tool: The Dysphagia Outcome and Severity Scale (ROCHELLE) is a simple, easy-to-use, 7-point scale developed to systematically rate the functional severity of dysphagia based on objective assessment and make recommendations for diet level, independence level, and type of nutrition. Tool Used: MODIFIED BRITT SCALE (mRS)   Score   No Symptoms  [] 0   No significant disability despite symptoms; able to carry out all usual duties and activities  [] 1   Slight disability; unable to carry out all previous activities but able to look after own affairs without assistance. [] 2   Moderate disability; requiring some help but able to walk without assistance  [] 3   Moderately severe disability; unable to walk without assistance and unable to attend to own bodily needs without assistance  [] 4   Severe disability; bedridden, incontinent, and requiring constant nursing care and attention  [] 5      Score:  Initial: 2    Interpretation of Tool: The Modified Scheller Scale is a 7-point scaled used to quantify level of disability as it relates to a patient's functional abilities. Current Medications:   No current facility-administered medications on file prior to encounter. Current Outpatient Medications on File Prior to Encounter   Medication Sig Dispense Refill    lisinopril (PRINIVIL, ZESTRIL) 40 mg tablet Take 1 Tab by mouth daily. 30 Tab 3    omeprazole (PRILOSEC) 20 mg capsule Take 1 Cap by mouth daily. 30 Cap 1    insulin NPH/insulin regular (NOVOLIN 70/30, HUMULIN 70/30) 100 unit/mL (70-30) injection by SubCUTAneous route.       Insulin Syringe-Needle U-100 1/2 mL 31 x 5/16\" syrg          PLAN    FREQUENCY/DURATION: Continue to follow patient 3 times a week for duration of hospital stay to address above goals. - Recommendations for next treatment session: Next treatment will address diet tolerance, dysarthria/cognitive linguistic treatment     REHABILITATION POTENTIAL FOR STATED GOALS: Excellent     COMPLIANCE WITH PROGRAM/EXERCISES: Will assess as treatment progresses    CONTINUATION OF SKILLED SERVICES/MEDICAL NECESSITY:   Patient is expected to demonstrate progress in  swallow strength, swallow timeliness, swallow function, diet tolerance and swallow safety in order to  improve swallow safety, work toward diet advancement and decrease aspiration risk.  Patient is expected to demonstrate progress in cognitive ability and speech intelligibility to decrease assistance required communication, increase independence with activities of daily living and increase communication with family/caregivers.  Patient continues to require skilled intervention due to deficits listed above. RECOMMENDATIONS   DIET:    PO:  Mechanical soft with chopped meat and vegetables   Liquids:  regular thin    MEDICATIONS: Crushed in puree     ASPIRATION PRECAUTIONS  · Slow rate of intake  · Small bites/sips  · Upright at 90 degrees during meal     COMPENSATORY STRATEGIES/MODIFICATIONS  · Small sips and bites  · Alternate solids/liquids  · Fully clear oral cavity between each bite      EDUCATION:  · Recommendations discussed with Family  · Patient     RECOMMENDATIONS for CONTINUED SPEECH THERAPY: YES: Anticipate need for ongoing speech therapy during this hospitalization and at next level of care.        SAFETY:  After treatment position/precautions:  · Upright in bed  · wife at bedside  · Call light within reach    Total Treatment Duration:   Time In: 3974  Time Out: AMADEO Fregoso [facility], ALFONZO CHAMPAGNEO PANCHO ARRIAZA CCC-SLP

## 2019-12-17 NOTE — PROGRESS NOTES
12/17/19 0711   NIH Stroke Scale   Interval Other (comment)  (Dual NIH with JERRY Benson)   LOC 0   LOC Questions 0   LOC Commands 0   Best Gaze 1   Visual 0   Facial Palsy 2   Motor Right Arm 0   Motor Left Arm 3   Motor Right Leg 0   Motor Left Leg 4   Limb Ataxia 0   Sensory 0   Best Language 0   Dysarthria 0   Extinction and Inattention 0   Total 10

## 2019-12-18 LAB
GLUCOSE BLD STRIP.AUTO-MCNC: 105 MG/DL (ref 65–100)
GLUCOSE BLD STRIP.AUTO-MCNC: 105 MG/DL (ref 65–100)
GLUCOSE BLD STRIP.AUTO-MCNC: 114 MG/DL (ref 65–100)
GLUCOSE BLD STRIP.AUTO-MCNC: 118 MG/DL (ref 65–100)
GLUCOSE BLD STRIP.AUTO-MCNC: 131 MG/DL (ref 65–100)
GLUCOSE BLD STRIP.AUTO-MCNC: 86 MG/DL (ref 65–100)

## 2019-12-18 PROCEDURE — 65660000000 HC RM CCU STEPDOWN

## 2019-12-18 PROCEDURE — 97530 THERAPEUTIC ACTIVITIES: CPT

## 2019-12-18 PROCEDURE — 74011636637 HC RX REV CODE- 636/637: Performed by: HOSPITALIST

## 2019-12-18 PROCEDURE — 74011250637 HC RX REV CODE- 250/637: Performed by: INTERNAL MEDICINE

## 2019-12-18 PROCEDURE — 82962 GLUCOSE BLOOD TEST: CPT

## 2019-12-18 PROCEDURE — 92526 ORAL FUNCTION THERAPY: CPT

## 2019-12-18 PROCEDURE — 74011250636 HC RX REV CODE- 250/636: Performed by: INTERNAL MEDICINE

## 2019-12-18 PROCEDURE — 92507 TX SP LANG VOICE COMM INDIV: CPT

## 2019-12-18 PROCEDURE — 74011250637 HC RX REV CODE- 250/637: Performed by: HOSPITALIST

## 2019-12-18 RX ADMIN — ASPIRIN 81 MG 81 MG: 81 TABLET ORAL at 08:23

## 2019-12-18 RX ADMIN — INSULIN LISPRO 5 UNITS: 100 INJECTION, SOLUTION INTRAVENOUS; SUBCUTANEOUS at 17:15

## 2019-12-18 RX ADMIN — INSULIN LISPRO 5 UNITS: 100 INJECTION, SOLUTION INTRAVENOUS; SUBCUTANEOUS at 08:22

## 2019-12-18 RX ADMIN — AMLODIPINE BESYLATE 10 MG: 10 TABLET ORAL at 08:23

## 2019-12-18 RX ADMIN — ATORVASTATIN CALCIUM 80 MG: 80 TABLET, FILM COATED ORAL at 21:29

## 2019-12-18 RX ADMIN — METOPROLOL TARTRATE 100 MG: 50 TABLET, FILM COATED ORAL at 17:16

## 2019-12-18 RX ADMIN — ENOXAPARIN SODIUM 40 MG: 40 INJECTION SUBCUTANEOUS at 15:56

## 2019-12-18 RX ADMIN — GUAIFENESIN AND CODEINE PHOSPHATE 10 ML: 10; 100 LIQUID ORAL at 04:27

## 2019-12-18 RX ADMIN — Medication 10 ML: at 15:56

## 2019-12-18 RX ADMIN — GUAIFENESIN AND CODEINE PHOSPHATE 10 ML: 10; 100 LIQUID ORAL at 21:29

## 2019-12-18 RX ADMIN — INSULIN GLARGINE 12 UNITS: 100 INJECTION, SOLUTION SUBCUTANEOUS at 09:00

## 2019-12-18 RX ADMIN — METOPROLOL TARTRATE 100 MG: 50 TABLET, FILM COATED ORAL at 08:23

## 2019-12-18 RX ADMIN — Medication 5 ML: at 21:29

## 2019-12-18 RX ADMIN — Medication 400 MG: at 08:23

## 2019-12-18 RX ADMIN — INSULIN LISPRO 5 UNITS: 100 INJECTION, SOLUTION INTRAVENOUS; SUBCUTANEOUS at 11:34

## 2019-12-18 RX ADMIN — Medication 400 MG: at 17:16

## 2019-12-18 RX ADMIN — ACETAMINOPHEN 650 MG: 325 TABLET, FILM COATED ORAL at 11:34

## 2019-12-18 RX ADMIN — LISINOPRIL 40 MG: 20 TABLET ORAL at 08:23

## 2019-12-18 RX ADMIN — GUAIFENESIN AND CODEINE PHOSPHATE 10 ML: 10; 100 LIQUID ORAL at 08:23

## 2019-12-18 NOTE — PROGRESS NOTES
12/17/19 2208   NIH Stroke Scale   Interval Other (comment)  (Neuro checks)   LOC 0   LOC Questions 0   LOC Commands 0   Motor Right Arm 0   Motor Left Arm 3   Motor Right Leg 0   Motor Left Leg 4   Dysarthria 1     Neuro checks

## 2019-12-18 NOTE — PROGRESS NOTES
Nutrition follow-up:     Assessment:   Diet: DIET NUTRITIONAL SUPPLEMENTS All Meals; Glucerna Shake  DIET DIABETIC CONSISTENT CARB Mechanical Soft; AHA-LOW-CHOL FAT     Food/Nutrition Patient History:    Pt is 54 yo male who presented with weakness, facial drooping and slurred speech. He has history of CVA, DM type 2, HTN, pancreatitis, GERD and a smoker. Diet progressed per SLP 12/17. Visit with pt, wife and family friend at bedside. Intake increasing with diet advance. Pt has been consuming glucerna in addition to meals on puree. He reports + intake of glucerna this am as well. Results for Sophie Gutierrez (MRN [id number]) as of 12/18/2019 11:40   12/17/2019 16:22 12/17/2019 18:20 12/17/2019 20:35 12/18/2019 07:09 12/18/2019 11:10   GLUCOSE,FAST - POC 63 (L) 97 108 (H) 131 (H) 118 (H)     Anthropometrics:  Height: 5' 6\" (167.6 cm), Weight: 95.3 kg (210 lb), source not specified, Body mass index is 33.89 kg/m². BMI class of obese. Last 3 Recorded Weights in this Encounter    12/12/19 0906   Weight: 95.3 kg (210 lb)   Macronutrient needs: MSJ (using CBW (Current body weight) unknown 95.3 kg)  EER: 2130 kcal/day (22 kcals/kg )  EPR: 106 g/day (1.1 g/kg ) (20%)     Intake/Comparative Standards: No recorded po. Pt and family report varied from % of foods and supplements which would potentially meet % of estimated needs.      Nutrition Intervention:  Meals and Snacks: Continue with current diet. Progressions per SLP. Commercial Beverages and Supplements: Continue Glucerna TID in vanilla. Consider decrease if intake of foods continues to improve or if blood glucose increases.    Discharge Nutrition Plan: Too soon to determine    Janice Odor [place], [address], [address]

## 2019-12-18 NOTE — PROGRESS NOTES
Problem: Mobility Impaired (Adult and Pediatric)  Goal: *Acute Goals and Plan of Care  Description  STG:  (1.) Mr. Wendy Fischer will move from supine to sit and sit to supine , scoot up and down and roll side to side with MINIMAL ASSIST within 3 treatment day(s). (2.) Mr. Wendy Fischer will transfer from bed to chair and chair to bed with MODERATE ASSIST using the least restrictive device within 3 treatment day(s). (3.) Mr. Wendy Fischer will perform standing static and dynamic balance activities x 10 minutes with MODERATE ASSISTx1 to improve safety within 3 treatment day(s). LTG:  (1.) Mr. Wendy Fischer will move from supine to sit and sit to supine , scoot up and down and roll side to side with CONTACT GUARD ASSIST within 7 treatment day(s). (2.) Mr. Wendy Fischer will transfer from bed to chair and chair to bed with MINIMAL ASSIST using the least restrictive device within 7 treatment day(s). (3.) Mr. Wendy Fischer will ambulate with MODERATE ASSIST for 50+ feet with the least restrictive device within 7 treatment day(s). (4.) Mr. Wendy Fischer will perform standing static and dynamic balance activities x 20 minutes with MINIMAL ASSIST to improve safety within 7 treatment day(s). (5.) Mr. Wendy Fischer will perform bilateral lower extremity exercises x 15 min for HEP with SUPERVISION to improve strength, endurance, and functional mobility within 7 treatment day(s).      PHYSICAL THERAPY: Daily Note and PM 12/18/2019  INPATIENT: PT Visit Days : 3  Payor: MEDICAID PENDING / Plan: [employer] PENDING / Product Type: Medicaid /       NAME/AGE/GENDER: Ebenezer Lima is a 53 y.o. male   PRIMARY DIAGNOSIS: Acute ischemic stroke ([address] [I63.9]  Cerebrovascular accident (CVA) due to embolism ([address] [I63.9] Acute ischemic stroke ([address] Acute ischemic stroke ([address]       ICD-10: Treatment Diagnosis:   · Other lack of cordination (R27.8)  · Difficulty in walking, Not elsewhere classified (R26.2)  · Other abnormalities of gait and mobility (R26.89)  · Unspecified Lack of Coordination (R27.9)  · Hemiplegia and hemiparesis following cerebral infarction affecting   · left non-dominant side (D46.418)   Precaution/Allergies:  Patient has no known allergies. ASSESSMENT:     Mr. Anoop Myers is a 53year old M who presents to hospital with L sided facial droop and LUE and LLE weakness. MRI indicated acute infarcts in L cerebellar hemisphere, deep frontoparietal white matter and R paramedian yariel. Prior to hospital admission pt lives with his wife in a one story apartment on the second level with 12 steps to access. Pt endorses no falls in past 6 months. Prior to admission Mr. Anoop Myers uses no DME for mobility. He reports he had been working full time but got laid off, so now he works just a few days at [employer], which requires him being on his feet. Upon entering, pt resting in bed, wife at bedside and agreeable to therapy with note able speech impairment. He needs maxA help L LE over EOB, and modA to roll L and maxAto finish trnf to EOB. He worked on his static sitting balance then his dynamic sitting balance and was able to maintain some of the time with just verbal cues. He would get distracted and look in different direction and would fall onto R elbow. He practiced sit to stand with luke walker on R 3 times getting better each time at finding his balance and at one point needed just a L knee block to stand unsupported. He trnf to chair using luke walker, maxA to asst with wt shifting to move L foot around. L foot stuck in inversion. Good progress and will need aggressive therapy at [facility] if possible to maximize his rehab potential for all therapies. This section established at most recent assessment   PROBLEM LIST (Impairments causing functional limitations):  1. Decreased Strength  2. Decreased ADL/Functional Activities  3. Decreased Transfer Abilities  4. Decreased Ambulation Ability/Technique  5. Decreased Balance  6.  Increased Pain  7. Decreased Activity Tolerance  8. Increased Fatigue  9. Decreased Flexibility/Joint Mobility   INTERVENTIONS PLANNED: (Benefits and precautions of physical therapy have been discussed with the patient.)  1. Balance Exercise  2. Bed Mobility  3. Family Education  4. Gait Training  5. Home Exercise Program (HEP)  6. Manual Therapy  7. Neuromuscular Re-education/Strengthening  8. Range of Motion (ROM)  9. Therapeutic Activites  10. Therapeutic Exercise/Strengthening  11. Transfer Training     TREATMENT PLAN: Frequency/Duration: 3 times a week for duration of hospital stay  Rehabilitation Potential For Stated Goals: [facility] (at time of discharge pending progress):    Placement: It is my opinion, based on this patient's performance to date, that Mr. Sherine Joshi may benefit from intensive therapy at an [address] [place] [address] after discharge due to a probable need for 24 hour rehab nursing, a probable need for multiple therapy disciplines, and potential to make ongoing and sustainable functional improvement that is of practical value. .  Equipment:    TBD pending progress with therapy. HISTORY:   History of Present Injury/Illness (Reason for Referral):  Per H&P: \"Pt is a 52 y/o smoker with DM, HTN, who presented to ER with L leg and arm weakness, L facial droop, dysarthria. First noted L leg weakness late night 12/11 when he woke up to go to the bathroom. He was normal when he went to bed around 1030. Woke up this morning and had persistent weakness L leg and also now noted in L arm. EMS called. Noted to have slurred speech and L facial droop as well. Code S called in ER around 9am.  MRI with acute infarcts in L cerebellar hemisphere, deep frontoparietal white matter, and R paramedian yariel. Also noted old lacunar infarcts. No large vessel occlusion on CTA, but some stenosis noted. No hx afib, TIA, CVA. No CP, palpitations, SOB. \"  Past Medical History/Comorbidities:   Mr. Kathleen Jensen  has a past medical history of Acute ischemic stroke ([address] (12/12/2019), Acute pancreatitis (11/19/2014), Cerebrovascular accident (CVA) due to embolism ([address] (12/12/2019), Diabetes ([address] (2002), Diabetes ([address] Diabetes mellitus, and Hypertension. He also has no past medical history of Arthritis, Asthma, Autoimmune disease ([address] CAD (coronary artery disease), Cancer ([address] Chronic kidney disease, COPD, Dementia, Dementia ([address] Heart failure ([address] Ill-defined condition, Infectious disease, Liver disease, Other ill-defined conditions(799.89), Psychiatric disorder, PUD (peptic ulcer disease), Seizures ([address] or Sleep disorder. Mr. Kathleen Jensen  has a past surgical history that includes hx hernia repair and hx orthopaedic. Social History/Living Environment:   Home Environment: Apartment  # Steps to Enter: 12  Rails to Enter: Yes  [employer] : Bilateral  One/Two Story Residence: One story  Living Alone: No  Support Systems: Spouse/Significant Other/Partner  Patient Expects to be Discharged to[de-identified] Unknown  Current DME Used/Available at Home: None  Tub or Shower Type: Tub/Shower combination  Prior Level of Function/Work/Activity:  Independent, lives with wife in 2nd story 1 level apartment. No recent falls. Number of Personal Factors/Comorbidities that affect the Plan of Care: 1-2: MODERATE COMPLEXITY   EXAMINATION:   Most Recent Physical Functioning:   Gross Assessment:                  Posture:     Balance:  Sitting - Static: Fair (occasional); Poor (constant support)  Sitting - Dynamic: Fair (occasional); Poor (constant support)  Standing: Impaired  Standing - Static: Poor  Standing - Dynamic : Poor Bed Mobility:  Rolling: Maximum assistance  Supine to Sit: Maximum assistance  Scooting: Maximum assistance  Wheelchair Mobility:     Transfers:  Sit to Stand: Maximum assistance  Stand to Sit: Maximum assistance  Stand Pivot Transfers: Maximum assistance  Gait:            Body Structures Involved:  1. Nerves  2. Voice/Speech  3. Bones  4. Joints  5. Muscles Body Functions Affected:  1. Mental  2. Sensory/Pain  3. Neuromusculoskeletal  4. Movement Related Activities and Participation Affected:  1. General Tasks and Demands  2. Mobility  3. Self Care  4. Interpersonal Interactions and Relationships   Number of elements that affect the Plan of Care: 4+: HIGH COMPLEXITY   CLINICAL PRESENTATION:   Presentation: Evolving clinical presentation with changing clinical characteristics: MODERATE COMPLEXITY   CLINICAL DECISION MAKIN [facility] Inpatient Short Form  How much difficulty does the patient currently have. .. Unable A Lot A Little None   1. Turning over in bed (including adjusting bedclothes, sheets and blankets)? [] 1   [] 2   [x] 3   [] 4   2. Sitting down on and standing up from a chair with arms ( e.g., wheelchair, bedside commode, etc.)   [] 1   [x] 2   [] 3   [] 4   3. Moving from lying on back to sitting on the side of the bed? [] 1   [x] 2   [] 3   [] 4   How much help from another person does the patient currently need. .. Total A Lot A Little None   4. Moving to and from a bed to a chair (including a wheelchair)? [] 1   [x] 2   [] 3   [] 4   5. Need to walk in hospital room? [] 1   [x] 2   [] 3   [] 4   6. Climbing 3-5 steps with a railing? [] 1   [x] 2   [] 3   [] 4   © , Trustees of [address] [place] [address], under license to [employer]. All rights reserved      Score:  Initial: 13 Most Recent: X (Date: -- )    Interpretation of Tool:  Represents activities that are increasingly more difficult (i.e. Bed mobility, Transfers, Gait). Medical Necessity:     · Patient is expected to demonstrate progress in   · strength, range of motion, balance, coordination, and functional technique  ·  to   · increase independence with all mobility.    · .  Reason for Services/Other Comments:  · Patient continues to require skilled intervention due to   · medical complications and mobility deficits which impact his level of function, safety, and independence as indicated above. · .   Use of outcome tool(s) and clinical judgement create a POC that gives a: Questionable prediction of patient's progress: MODERATE COMPLEXITY        TREATMENT:   (In addition to Assessment/Re-Assessment sessions the following treatments were rendered)   Pre-treatment Symptoms/Complaints:  \"ok\"  Pain: Initial:   Pain Intensity 1: 0  Post Session:  No pain reported at end of session, resting comfortably in bed. Therapeutic Activity: (    25 Minutes): Therapeutic activities including bed mobility, rolling, scooting, sit <> stand transfer training  bed to improve mobility, strength, balance, and coordination. Required maximal cues with moderate physical assist   to promote coordination of bilateral, upper extremity(s), lower extremity(s) and promote motor control of bilateral, upper extremity(s), lower extremity(s). Neuromuscular Re-education: (  0Minutes):  Sitting static and dynamic balance activities, and static standing balance control training and dynamic standing balance control training to improve balance, coordination, kinesthetic sense, and posture. Required maximal visual, verbal, manual, and tactile cues to promote static and dynamic balance in standing, promote coordination of left, upper extremity(s), lower extremity(s) and promote motor control of left, upper extremity(s), lower extremity(s). Braces/Orthotics/Lines/Etc:   · O2 Device: Room Air   Treatment/Session Assessment:    · Response to Treatment:  see above. Works very hard with therapy.   · Interdisciplinary Collaboration:   o Physical Therapist  o Registered Nurse  · After treatment position/precautions:   o Up in chair  o Bed alarm/tab alert on  o Bed/Chair-wheels locked  o Bed in low position  o Call light within reach  o RN notified  o Family at bedside   · Compliance with Program/Exercises: Compliant all of the time  · Recommendations/Intent for next treatment session: \"Next visit will focus on advancements to more challenging activities and reduction in assistance provided\".     Total Treatment Duration:  PT Patient Time In/Time Out  Time In: 1310  Time Out: [address], [facility]

## 2019-12-18 NOTE — PROGRESS NOTES
Dysphagia Goals:  Description  LTG: Patient will tolerate least restrictive diet without overt signs or symptoms of airway compromise. STG: Patient will tolerate puree diet and thin liquids (no mixed consistencies, no straws) without overt signs or symptoms of airway compromise. Edited 12/13/19,  MET 12/17/19  STG: Patient will participate in chewable trials to determine ability to tolerate diet advancement. MET 12/18/19  STG: Patient will tolerate mechanical soft diet/thin liquids without overt s/sx airway compromise. MET12/18/19  STG: Patient will participate in modified barium swallow study as clinically indicated. Discontinued 12/18/19     Outcome: Progressing Towards Goal     Neurolinguistic Goals:  LTG: Patient will increase neuro-linguistic abilities to increase safety and awareness in functional living environment   STG: Patient will participate in speech/language/cognitive evaluation.  MET 12/17/19  STG: Patient will solve mathematical problems with 80%accuracy given minimal cueing   STG: Patient will name 10 items to concrete category in 1 minute given minimal cueing  STG: Patient will participate in verbal reasoning tasks with 80% accuracy given minimal cueing  STG: Patient will complete mental manipulation tasks with 80% accuracy given minimal cueing  STG: Patient will complete working memory/attention tasks with 80% accuracy given minimal cueing  STG: Patient will recall relevant verbal information with 80% accuracy with minimal assistance  STG: Patient will utilize memory compensatory strategies to improve recall of functional list/message with 90%accuracy given minimal assistance  STG: Patient will participate in ongoing cognitive linguistic evaluation for therapeutic assessment      Dysarthria Goals:  LTG: Patient will develop functional and intelligible speech and utilize compensatory strategies to improve communication across environments  STG: Patient will fill in carrier phrase/complete automatic speech tasks with 95% accuracy given minimal cueing  STG: Patient will repeat phrases, sentences with 85% accuracy given minimal cueing  STG: Patient will utilize compensatory strategies across tasks with 90% accuracy given minimal cueing     SPEECH LANGUAGE PATHOLOGY: DYSPHAGIA AND SPEECH-LANGUAGE/COGNITION: Daily Note 1    NAME/AGE/GENDER: Avelino Bacon is a 53 y.o. male  DATE: 12/18/2019  PRIMARY DIAGNOSIS: Acute ischemic stroke [facility]) [I63.9]  Cerebrovascular accident (CVA) due to embolism ([facility] [I63.9]      ICD-10: Treatment Diagnosis: R13.12 Dysphagia, Oropharyngeal Phase  R47.1 Dysarthria and Anarthria  R41.841 Cognitive-Communication Deficit    INTERDISCIPLINARY COLLABORATION: Registered Nurse  PRECAUTIONS/ALLERGIES: Patient has no known allergies. SUBJECTIVE   Wife present. Baseline cough. Current Diet mechanical soft diet/thin liquids     Problem List:  (Impairments causing functional limitations):  1. Oropharyngeal dysphagia-no pharyngeal identified this date   2. Dysarthria  3. Cognitive linguistic deficits    Orientation:   Person  Place  Time  Situation     Pain: Pain Scale 1: Numeric (0 - 10)  Pain Intensity 1: 7  Pain Location 1: Arm  Pain Intervention(s) 1: Nurse notified         OBJECTIVE   Dysphagia treatment   Patient seen for diet tolerance. Patient presented with solids and thin liquids via cup and straw. No overt s/sx airway compromise and vocal quality remained clear. Increased oral prep and bolus formation with mild diffuse oral residue, however cleared effectively independently.          Speech and Cognitive Linguistic Treatment:    Utilized dysarthria strategies across the following tasks with min-mod cueing-  Responsive naming: 3/4, increasing to 4/4   Name item in category given initial letter: 5/6, increasing to 6/6  Automatic speech: 1/3, increasing to 3/3   Naming ingredients needed to make a cheeseburger: 3/4, increasing to 4/4   Recall of strategies: 1/3 independently, no improvement with field of 2      ASSESSMENT   Dysphagia: patient continues to demonstrate oral dysphagia, however functional with increased time and use of strategies listed below. Pharyngeal swallow within normal limits. Baseline cough before and after po, however did not occur with po intake. Patient remains inappropriate for advancement to regular consistencies, however may benefit from re-evaluation in future. Dysphagia goals met at this time. Recommend continue mechanical soft diet/thin liquids. Medications whole in puree. No further dysphagia treatment indicated at this time. Speech/Cognitive Linguistic: Patient presents with dysarthria and cognitive linguistic deficits. Moderate dysarthria requiring min-moderate cueing to decrease rate, over articulate, increase volume, and segment multi syllabic words. Impaired short term recall as evidenced by difficulty recalling dysarthria strategies rehearsed and utilized throughout session. Discussed memory strategies such as note taking to improve ability to retain information. Patient also noted with impaired generative thinking requiring cueing to name basic items needed to make cheeseburger. Patient currently functioning below baseline and will benefit from ongoing skilled intervention to improve speech intelligibility and cognitive linguistic function to increase functional communication.         Tool Used: Dysphagia Outcome and Severity Scale (ROCHELLE)    Score Comments   Normal Diet  [] 7 With no strategies or extra time needed   Functional Swallow  [] 6 May have mild oral or pharyngeal delay   Mild Dysphagia  [] 5 Which may require one diet consistency restricted    Mild-Moderate Dysphagia  [] 4 With 1-2 diet consistencies restricted   Moderate Dysphagia  [] 3 With 2 or more diet consistencies restricted   Moderate-Severe Dysphagia  [] 2 With partial PO strategies (trials with ST only)   Severe Dysphagia  [] 1 With inability to tolerate any PO safely      Score:  Initial: 4 Most Recent:  5(Date 12/18/19 )   Interpretation of Tool: The Dysphagia Outcome and Severity Scale (ROCHELLE) is a simple, easy-to-use, 7-point scale developed to systematically rate the functional severity of dysphagia based on objective assessment and make recommendations for diet level, independence level, and type of nutrition. Tool Used: MODIFIED MARTY SCALE (mRS)   Score   No Symptoms  [] 0   No significant disability despite symptoms; able to carry out all usual duties and activities  [] 1   Slight disability; unable to carry out all previous activities but able to look after own affairs without assistance. [] 2   Moderate disability; requiring some help but able to walk without assistance  [] 3   Moderately severe disability; unable to walk without assistance and unable to attend to own bodily needs without assistance  [] 4   Severe disability; bedridden, incontinent, and requiring constant nursing care and attention  [] 5      Score:  Initial: 2    Interpretation of Tool: The Modified Marty Scale is a 7-point scaled used to quantify level of disability as it relates to a patient's functional abilities. Current Medications:   No current facility-administered medications on file prior to encounter. Current Outpatient Medications on File Prior to Encounter   Medication Sig Dispense Refill    lisinopril (PRINIVIL, ZESTRIL) 40 mg tablet Take 1 Tab by mouth daily. 30 Tab 3    omeprazole (PRILOSEC) 20 mg capsule Take 1 Cap by mouth daily. 30 Cap 1    insulin NPH/insulin regular (NOVOLIN 70/30, HUMULIN 70/30) 100 unit/mL (70-30) injection by SubCUTAneous route.  Insulin Syringe-Needle U-100 1/2 mL 31 x 5/16\" syrg          PLAN    FREQUENCY/DURATION: Continue to follow patient 3 times a week for duration of hospital stay to address above goals.     - Recommendations for next treatment session: Next treatment will address dysarthria/cognitive linguistic treatment     REHABILITATION POTENTIAL FOR STATED GOALS: Excellent     COMPLIANCE WITH PROGRAM/EXERCISES: Will assess as treatment progresses    CONTINUATION OF SKILLED SERVICES/MEDICAL NECESSITY:   No further dysphagia treatment at this time as dysphagia goals met.  Patient is expected to demonstrate progress in expressive communication, cognitive ability and speech intelligibility to decrease assistance required communication, increase independence with activities of daily living and increase communication with family/caregivers.  Patient continues to require skilled intervention due to dysarthria and cognitive linguistic deficits. RECOMMENDATIONS   DIET:    PO:  Mechanical soft   Liquids:  regular thin    MEDICATIONS: Whole in puree     ASPIRATION PRECAUTIONS  · Slow rate of intake  · Small bites/sips  · Upright at 90 degrees during meal     COMPENSATORY STRATEGIES/MODIFICATIONS  · Alternate liquids/solids  · Small sips and bites  · Lingual sweep  · Limit distractions      EDUCATION:  · Recommendations discussed with Nursing  · Family  · Patient     RECOMMENDATIONS for CONTINUED SPEECH THERAPY: YES: Anticipate need for ongoing speech therapy during this hospitalization and at next level of care.        SAFETY:  After treatment position/precautions:  · Upright in bed  · RN notified  · Call light within reach   · Wife present    Total Treatment Duration:   Time In: 6367  Time Out: [address], [facility] MEDICO DEL [facility], [address] [place] [address]

## 2019-12-18 NOTE — PROGRESS NOTES
12/18/19 0400   NIH Stroke Scale   Interval Other (comment)  (Neuro checks)   LOC 0   LOC Questions 0   LOC Commands 0   Motor Right Arm 0   Motor Left Arm 3   Motor Right Leg 0   Motor Left Leg 4   Dysarthria 1     Neuro checks

## 2019-12-18 NOTE — PROGRESS NOTES
12/17/19 1907   NIH Stroke Scale   Interval Other (comment)   LOC 0   LOC Questions 0   LOC Commands 0   Best Gaze 1   Visual 0   Facial Palsy 2   Motor Right Arm 0   Motor Left Arm 3   Motor Right Leg 0   Motor Left Leg 4   Limb Ataxia 0   Sensory 0   Best Language 0   Dysarthria 1   Extinction and Inattention 0   Total 11   NIH at bedside with Sher Cheney RN

## 2019-12-18 NOTE — PROGRESS NOTES
Chart reviewed. Pt continuing to work with therapy. Requiring max assist x2. [facility] following for possible rehab admission. Pt remains uninsured so rehab options are limited. SW to continue to follow.

## 2019-12-18 NOTE — PROGRESS NOTES
12/18/19 0724   NIH Stroke Scale   Interval Other (comment)  (Dual NIH with Doug Miguel RN)   LOC 0   LOC Questions 0   LOC Commands 0   Best Gaze 1   Visual 0   Facial Palsy 2   Motor Right Arm 0   Motor Left Arm 3   Motor Right Leg 0   Motor Left Leg 4   Limb Ataxia 0   Sensory 0   Best Language 0   Dysarthria 1   Extinction and Inattention 0   Total 11     Dual NIH with Doug Miguel RN    Ischemic Stroke without Activase/TIA    VTE Prophylaxis: Yes: Lovenox    Antiplatelet: Yes: aspirin    Statin if LDL Greater Than or Equal to100: Yes: Lipitor    BP Parameters: Less Than 160 SBP    Controlled With: Scheduled PO, PRN - IV and PRN - PO    Dysphagia Screen Completed: Yes: Fail  Dysphagia Screening  Vocal Quality/Secretions: (!) Abnormal  History of Dysphagia: No  O2 Saturation: Normal  Alertness: Normal  Pre-Swallow Assessment Score: 1   Cleared by Speech for Mechanical Soft, Thin Liquids, Pills whole in applesauce    Patient has PEG, NG Tube, Feeding Tube: No    Medication orders per above route: Yes    Nutrition Status: PO    NIH Stroke Scale Complete: Yes: 11    Frequency of Vital Signs: Every 4 hours     Frequency of Neuro Checks: Every 4 hours    Daily Education/Care Plan Updated: Yes     Bedside report to Doug Rose RN

## 2019-12-18 NOTE — PROGRESS NOTES
Hospitalist Note     Admit Date:  2019  9:07 AM   Name:  Ryne Loera   Age:  53 y.o.  :  1975   MRN:  [id number]   PCP:  Thiago Santiago MD      HPI/Subjective:      52 y/o smoker with DM, HTN, who presented to ER  with L leg and arm weakness, L facial droop, dysarthria. First noted L leg weakness late night  when he woke up to go to the bathroom. He was normal when he went to bed around 1030. Woke up and had persistent weakness L leg and also now noted in L arm. EMS called. Noted to have slurred speech and L facial droop as well. Code S called in ER around 9am.  MRI with acute infarcts in L cerebellar hemisphere, deep frontoparietal white matter, and R paramedian yariel. Also noted old lacunar infarcts. No large vessel occlusion on CTA, but some stenosis noted. No hx afib, TIA, CVA. No CP, palpitations, SOB. Today, no ac events, tolerated po, no new neuro spx or improvement, no changes, improved dry cough, no SOB    Objective:     Patient Vitals for the past 24 hrs:   Temp Pulse Resp BP SpO2   19 0759 97.4 °F (36.3 °C) 63 17 145/81 91 %   19 0400 98.1 °F (36.7 °C) 69 18 146/87 90 %   19 0000 98 °F (36.7 °C) 69 18 136/81 91 %   19 2000 98.2 °F (36.8 °C) 70 18 147/87 92 %   19 1600 98.4 °F (36.9 °C) 71 18 131/78 92 %   19 1200 98.3 °F (36.8 °C) 72 18 133/80 90 %     Oxygen Therapy  O2 Sat (%): 91 % (19 0759)  Pulse via Oximetry: 73 beats per minute (19 1615)  No intake or output data in the 24 hours ending 19 0959    Physical Exam:  General:    Well nourished. Alert and oriented. CV:   RRR. No murmur, rub, or gallop. Lungs:  Clear to auscultation bilaterally. No wheezing, rhonchi, or rales  Extremities: Warm and dry. No cyanosis or edema. Neurologic: CN II-XII intact except for mild L facial droop. Sensation intact. Finger to nose intact. PERRLA. Mild slurred speech. No confusion. STR 1/5 LUE and LLE.   Skin: No rashes or jaundice. No wounds. Psych:  Normal mood and affect. I reviewed the labs, imaging, EKGs, telemetry, and other studies done this admission.   Data Review:   Recent Results (from the past 24 hour(s))   GLUCOSE, POC    Collection Time: 12/17/19 11:35 AM   Result Value Ref Range    Glucose (POC) 186 (H) 65 - 100 mg/dL   GLUCOSE, POC    Collection Time: 12/17/19  4:22 PM   Result Value Ref Range    Glucose (POC) 63 (L) 65 - 100 mg/dL   GLUCOSE, POC    Collection Time: 12/17/19  6:20 PM   Result Value Ref Range    Glucose (POC) 97 65 - 100 mg/dL   GLUCOSE, POC    Collection Time: 12/17/19  8:35 PM   Result Value Ref Range    Glucose (POC) 108 (H) 65 - 100 mg/dL   GLUCOSE, POC    Collection Time: 12/18/19  7:09 AM   Result Value Ref Range    Glucose (POC) 131 (H) 65 - 100 mg/dL       All Micro Results     None          Current Facility-Administered Medications   Medication Dose Route Frequency    guaiFENesin-codeine (ROBITUSSIN AC) 100-10 mg/5 mL solution 10 mL  10 mL Oral Q4H PRN    metoprolol tartrate (LOPRESSOR) tablet 100 mg  100 mg Oral BID    insulin glargine (LANTUS) injection 12 Units  12 Units SubCUTAneous DAILY    insulin lispro (HUMALOG) injection 5 Units  5 Units SubCUTAneous TIDAC    fentaNYL citrate (PF) injection  mcg   mcg IntraVENous Multiple    midazolam (VERSED) injection 0.5-5 mg  0.5-5 mg IntraVENous Multiple    magnesium oxide (MAG-OX) tablet 400 mg  400 mg Oral BID    hydrALAZINE (APRESOLINE) 20 mg/mL injection 20 mg  20 mg IntraVENous Q4H PRN    atorvastatin (LIPITOR) tablet 80 mg  80 mg Oral QHS    amLODIPine (NORVASC) tablet 10 mg  10 mg Oral DAILY    cloNIDine HCl (CATAPRES) tablet 0.1 mg  0.1 mg Oral Q4H PRN    lisinopril (PRINIVIL, ZESTRIL) tablet 40 mg  40 mg Oral DAILY    sodium chloride (NS) flush 5-40 mL  5-40 mL IntraVENous Q8H    sodium chloride (NS) flush 5-40 mL  5-40 mL IntraVENous PRN    ondansetron (ZOFRAN) injection 4 mg  4 mg IntraVENous Q4H PRN    aspirin chewable tablet 81 mg  81 mg Oral DAILY    acetaminophen (TYLENOL) tablet 650 mg  650 mg Oral Q4H PRN    polyethylene glycol (MIRALAX) packet 17 g  17 g Oral DAILY PRN    enoxaparin (LOVENOX) injection 40 mg  40 mg SubCUTAneous Q24H    insulin lispro (HUMALOG) injection   SubCUTAneous AC&HS    dextrose 40% (GLUTOSE) oral gel 1 Tube  15 g Oral PRN    glucagon (GLUCAGEN) injection 1 mg  1 mg IntraMUSCular PRN    dextrose (D50W) injection syrg 12.5-25 g  25-50 mL IntraVENous PRN       Other Studies:  No results found. Results for orders placed or performed during the hospital encounter of 19   2D ECHO COMPLETE ADULT (TTE) [address]  [address] [facility], [address]  [phone]    Transthoracic Echocardiogram  2D, M-mode, Doppler, and Color Doppler    Patient: Zachery Monroe  MR #: [id number]  : 1975  Age: 53 years  Gender: Male  Study date: 12-Dec-2019  Account #: [de-identified]  Height: 66 in  Weight: 209.7 lb  BSA: 2.04 mï¾²  Status:Routine  Location: ER03  BP: 133/ 76    Allergies: NO KNOWN ALLERGENS    Sonographer:  LUZ Ames  Group:  [facility]  Referring Physician:  Tarik Sanchez. Srinivasa Thomas MD  Reading Physician:  Jeff Shah MD [facility]    INDICATIONS: CVA    PROCEDURE: This was a routine study. A transthoracic echocardiogram was  performed. The study included complete 2D imaging, M-mode, complete spectral  Doppler, and color Doppler. Intravenous contrast (Definity) was administered. Image quality was adequate. LEFT VENTRICLE: Size was normal. Systolic function was normal. Ejection  fraction was estimated in the range of 65 % to 70 %. There were no regional  wall motion abnormalities. There was mild concentric hypertrophy.  Left  ventricular diastolic function parameters were normal. Avg E/e': 11.05.    RIGHT VENTRICLE: The size was normal. Systolic function was normal. The  tricuspid jet envelope definition was inadequate for estimation of RV   systolic  pressure. LEFT ATRIUM: Size was normal.    ATRIAL SEPTUM: Agitated saline contrast injection (bubble study) was   performed. There was a right-to-left shunt, in the baseline state. RIGHT ATRIUM: Size was normal.    SYSTEMIC VEINS: IVC: The inferior vena cava was normal in size and course. The  respirophasic change in diameter was more than 50%. AORTIC VALVE: The valve was trileaflet. Leaflets exhibited mild sclerosis. There was no evidence for stenosis. There was no insufficiency. MITRAL VALVE: Valve structure was normal. There was no evidence for stenosis. There was no regurgitation. TRICUSPID VALVE: The valve structure was normal. There was no evidence for  stenosis. There was trivial regurgitation. PULMONIC VALVE: Not well visualized. There was no evidence for stenosis. There  was no insufficiency. PERICARDIUM: There was no pericardial effusion. AORTA: The root exhibited normal size. SUMMARY:    -  Left ventricle: Systolic function was normal. Ejection fraction was  estimated in the range of 65 % to 70 %. There were no regional wall motion  abnormalities. There was mild concentric hypertrophy. -  Atrial septum: Agitated saline contrast injection (bubble study) was  performed. There was a right-to-left shunt, in the baseline state. -  Inferior vena cava, hepatic veins: The respirophasic change in diameter   was  more than 50%.     SYSTEM MEASUREMENT TABLES    2D mode  AoR Diam (2D): 2.8 cm  LA Dimension (2D): 3.5 cm  Left Atrium Systolic Volume Index; Method of Disks, Biplane; 2D mode;: 26   ml/m2  IVS/LVPW (2D): 1  IVSd (2D): 1.2 cm  LVIDd (2D): 5.2 cm  LVIDs (2D): 2.9 cm  LVOT Area (2D): 2.3 cm2  LVPWd (2D): 1.2 cm  RVIDd (2D): 2.2 cm    Tissue Doppler Imaging  LV Peak Early Thomas Tissue Rc; Lateral MA (TDI): 8.1 cm/s  LV Peak Early Thomas Tissue Rc; Medial MA (TDI): 7.6 cm/s    Unspecified Scan Mode  Peak Grad; Mean; Antegrade Flow: 14 mm[Hg]  Vmax; Antegrade Flow: 185 cm/s  LVOT Diam: 1.7 cm  MV Peak Rc/LV Peak Tissue Rc E-Wave; Lateral MA: 10.7  MV Peak Rc/LV Peak Tissue Rc E-Wave; Medial MA: 11.4    Prepared and signed by    Jeffery Valderrama. Yazmin Gutierrez MD [facility]  Signed 12-Dec-2019 15:46:58             Assessment and Plan:     Dx:  1- Acute embolic stroke, peristent weakness on left side.  Pt has PFO w/ large shunt  2- Hypertension, controlled  3- Hypokalemia and hypomagnesemia, replaced    Rx:  ASA  Statin and Omega 3  Neuro checks  Follow cardiology input  Neuro follow  Plan closure of PFO/ shunt 4 wks after DC    Dispo; need rehab, no insurance?, CM following    Signed:  Alem Serrano MD

## 2019-12-18 NOTE — PROGRESS NOTES
12/18/19 0000   NIH Stroke Scale   Interval Other (comment)  (Neuro Checks)   LOC 0   LOC Questions 0   LOC Commands 0   Motor Right Arm 0   Motor Left Arm 3   Motor Right Leg 0   Motor Left Leg 4   Dysarthria 1     Neuro checks

## 2019-12-18 NOTE — PROGRESS NOTES
[facility] CARDIOLOGY PROGRESS NOTE           12/18/2019 9:33 AM    Admit Date: 12/12/2019      Subjective:   Patient with persistent left sided weakness. No chest pain. Telemetry without atrial fibrillation    ROS:  Cardiovascular:  As noted above    Objective:      Vitals:    12/18/19 0400 12/18/19 0759 12/18/19 1159 12/18/19 1200   BP: 146/87 145/81 148/73    Pulse: 69 63 69 69   Resp: 18 17 18    Temp: 98.1 °F (36.7 °C) 97.4 °F (36.3 °C) 97.4 °F (36.3 °C)    SpO2: 90% 91% 96%    Weight:       Height:           Physical Exam:  General-No Acute Distress  Neck- supple, no JVD  CV- regular rate and rhythm no MRG  Lung- clear bilaterally  Abd- soft, nontender, nondistended  Ext- no edema bilaterally. Skin- warm and dry      Data Review:   No results for input(s): NA, K, MG, BUN, CREA, GLU, WBC, HGB, HCT, PLT, INR, TRIGL, LDL, HDL, HGBEXT, HCTEXT, PLTEXT, INREXT, HGBEXT, HCTEXT, PLTEXT, INREXT in the last 72 hours. No lab exists for component: TROPI, TCHOL   No results found for: DARA Packer    Assessment/Plan:     Principal Problem:    Acute ischemic stroke ([facility] [address] (12/12/2019)    S/P CVA. Multiple embolic events. Active Problems:    Hypertension ()    BP acceptable with recent CVA. Avoid hypotension. Diabetes ([facility] [address] ()    Defer management to primary team.          Arrhythmia (12/15/2019)    Brief run of wide complex tachycardia. No recurrence. Normal EF. Plan 4 week event monitor at discharge. REmain on telemetry in hospital.        Hyperlipemia (12/15/2019)    On statin therapy. PFO (patent foramen ovale) (12/17/2019)    PFO with high risk features including significant (3 mm) separation of septum secundum and primum as well as large shunt. Will await recovery of CVA. Plan 4 week event monitor at discharge. Will then see in offfice and if no arrythmias will plan PFO closure. Lower likelihood for afib with normal LA size. Cardiac condition stable. Will sign off. Please call with any questions or clinical changes. Appreciate consultation.               Fallon Combs MD  12/18/2019 9:33 AM

## 2019-12-19 LAB
GLUCOSE BLD STRIP.AUTO-MCNC: 147 MG/DL (ref 65–100)
GLUCOSE BLD STRIP.AUTO-MCNC: 149 MG/DL (ref 65–100)
GLUCOSE BLD STRIP.AUTO-MCNC: 210 MG/DL (ref 65–100)
GLUCOSE BLD STRIP.AUTO-MCNC: 62 MG/DL (ref 65–100)
GLUCOSE BLD STRIP.AUTO-MCNC: 86 MG/DL (ref 65–100)

## 2019-12-19 PROCEDURE — 77030027138 HC INCENT SPIROMETER -A

## 2019-12-19 PROCEDURE — 92507 TX SP LANG VOICE COMM INDIV: CPT

## 2019-12-19 PROCEDURE — 77030041247 HC PROTECTOR HEEL HEELMEDIX MDII -B

## 2019-12-19 PROCEDURE — 74011250636 HC RX REV CODE- 250/636: Performed by: INTERNAL MEDICINE

## 2019-12-19 PROCEDURE — 82962 GLUCOSE BLOOD TEST: CPT

## 2019-12-19 PROCEDURE — 74011636637 HC RX REV CODE- 636/637: Performed by: INTERNAL MEDICINE

## 2019-12-19 PROCEDURE — 74011250637 HC RX REV CODE- 250/637: Performed by: HOSPITALIST

## 2019-12-19 PROCEDURE — 74011636637 HC RX REV CODE- 636/637: Performed by: HOSPITALIST

## 2019-12-19 PROCEDURE — 74011250637 HC RX REV CODE- 250/637: Performed by: INTERNAL MEDICINE

## 2019-12-19 PROCEDURE — 65660000000 HC RM CCU STEPDOWN

## 2019-12-19 RX ADMIN — ENOXAPARIN SODIUM 40 MG: 40 INJECTION SUBCUTANEOUS at 14:12

## 2019-12-19 RX ADMIN — INSULIN GLARGINE 12 UNITS: 100 INJECTION, SOLUTION SUBCUTANEOUS at 08:57

## 2019-12-19 RX ADMIN — Medication 5 ML: at 04:32

## 2019-12-19 RX ADMIN — GUAIFENESIN AND CODEINE PHOSPHATE 10 ML: 10; 100 LIQUID ORAL at 23:25

## 2019-12-19 RX ADMIN — CLONIDINE HYDROCHLORIDE 0.1 MG: 0.2 TABLET ORAL at 04:32

## 2019-12-19 RX ADMIN — METOPROLOL TARTRATE 100 MG: 50 TABLET, FILM COATED ORAL at 17:31

## 2019-12-19 RX ADMIN — GUAIFENESIN AND CODEINE PHOSPHATE 10 ML: 10; 100 LIQUID ORAL at 15:15

## 2019-12-19 RX ADMIN — Medication 10 ML: at 21:03

## 2019-12-19 RX ADMIN — ASPIRIN 81 MG 81 MG: 81 TABLET ORAL at 08:55

## 2019-12-19 RX ADMIN — GUAIFENESIN AND CODEINE PHOSPHATE 10 ML: 10; 100 LIQUID ORAL at 04:33

## 2019-12-19 RX ADMIN — INSULIN LISPRO 5 UNITS: 100 INJECTION, SOLUTION INTRAVENOUS; SUBCUTANEOUS at 11:51

## 2019-12-19 RX ADMIN — Medication 10 ML: at 14:27

## 2019-12-19 RX ADMIN — METOPROLOL TARTRATE 100 MG: 50 TABLET, FILM COATED ORAL at 08:54

## 2019-12-19 RX ADMIN — INSULIN LISPRO 5 UNITS: 100 INJECTION, SOLUTION INTRAVENOUS; SUBCUTANEOUS at 08:57

## 2019-12-19 RX ADMIN — INSULIN LISPRO 4 UNITS: 100 INJECTION, SOLUTION INTRAVENOUS; SUBCUTANEOUS at 11:50

## 2019-12-19 RX ADMIN — AMLODIPINE BESYLATE 10 MG: 10 TABLET ORAL at 08:54

## 2019-12-19 RX ADMIN — Medication 400 MG: at 08:54

## 2019-12-19 RX ADMIN — ATORVASTATIN CALCIUM 80 MG: 80 TABLET, FILM COATED ORAL at 21:01

## 2019-12-19 RX ADMIN — Medication 400 MG: at 17:31

## 2019-12-19 RX ADMIN — LISINOPRIL 40 MG: 20 TABLET ORAL at 08:54

## 2019-12-19 NOTE — PROGRESS NOTES
12/19/19 0000   NIH Stroke Scale   Interval Other (comment)   LOC 0   LOC Questions 0   LOC Commands 0   Motor Right Arm 0   Motor Left Arm 4   Motor Right Leg 0   Motor Left Leg 4   Dysarthria 1     Neuro checks

## 2019-12-19 NOTE — PROGRESS NOTES
12/19/19 1489   NIH Stroke Scale   Interval Other (comment)  (dual NIH with Francisca EDWARDS)   LOC 0   LOC Questions 0   LOC Commands 0   Best Gaze 1   Visual 0   Facial Palsy 2   Motor Right Arm 0   Motor Left Arm 4   Motor Right Leg 0   Motor Left Leg 4   Limb Ataxia 0   Sensory 0   Best Language 0   Dysarthria 1   Extinction and Inattention 0   Total 12

## 2019-12-19 NOTE — PROGRESS NOTES
12/18/19 2127   NIH Stroke Scale   Interval Other (comment)  (Neuro checks)   LOC 0   LOC Questions 0   Motor Right Arm 0   Motor Left Arm 4   Motor Right Leg 0   Motor Left Leg 4   Dysarthria 1     Neuro checks

## 2019-12-19 NOTE — PROGRESS NOTES
12/19/19 0400   NIH Stroke Scale   Interval Other (comment)  (Neuro checks)   LOC 0   LOC Questions 0   LOC Commands 0   Motor Right Arm 0   Motor Left Arm 4   Motor Right Leg 0   Motor Left Leg 4   Dysarthria 1     Neuro checks

## 2019-12-19 NOTE — PROGRESS NOTES
Problem: Pressure Injury - Risk of  Goal: *Prevention of pressure injury  Description  Document Dewey Scale and appropriate interventions in the flowsheet. Outcome: Progressing Towards Goal  Note: Pressure Injury Interventions:  Sensory Interventions: Assess changes in LOC, Check visual cues for pain, Float heels    Moisture Interventions: Absorbent underpads, Apply protective barrier, creams and emollients    Activity Interventions: Increase time out of bed, PT/OT evaluation    Mobility Interventions: HOB 30 degrees or less, Float heels, PT/OT evaluation    Nutrition Interventions: Document food/fluid/supplement intake, Discuss nutritional consult with provider, Offer support with meals,snacks and hydration    Friction and Shear Interventions: HOB 30 degrees or less                Problem: Patient Education: Go to Patient Education Activity  Goal: Patient/Family Education  Outcome: Progressing Towards Goal     Problem: Falls - Risk of  Goal: *Absence of Falls  Description  Document Jeanne Fall Risk and appropriate interventions in the flowsheet.   Outcome: Progressing Towards Goal  Note: Fall Risk Interventions:  Mobility Interventions: Bed/chair exit alarm, OT consult for ADLs, Patient to call before getting OOB, PT Consult for mobility concerns    Mentation Interventions: Bed/chair exit alarm, Door open when patient unattended    Medication Interventions: Bed/chair exit alarm, Patient to call before getting OOB, Teach patient to arise slowly    Elimination Interventions: Bed/chair exit alarm, Call light in reach, Patient to call for help with toileting needs, Toileting schedule/hourly rounds    History of Falls Interventions: Bed/chair exit alarm, Consult care management for discharge planning, Door open when patient unattended         Problem: Patient Education: Go to Patient Education Activity  Goal: Patient/Family Education  Outcome: Progressing Towards Goal

## 2019-12-19 NOTE — PROGRESS NOTES
12/19/19 0701   NIH Stroke Scale   Interval Other (comment)  (Dual NIH with Ethel RN)   LOC 0   LOC Questions 0   LOC Commands 0   Best Gaze 1   Visual 0   Facial Palsy 2   Motor Right Arm 0   Motor Left Arm 4   Motor Right Leg 0   Motor Left Leg 4   Limb Ataxia 0   Sensory 0   Best Language 0   Dysarthria 1   Extinction and Inattention 0   Total 12     Dual NIH with Ethel RN    Ischemic Stroke without Activase/TIA     VTE Prophylaxis: Yes: Lovenox     Antiplatelet: Yes: aspirin     Statin if LDL Greater Than or Equal to100: Yes: Lipitor     BP Parameters: Less Than 160 SBP     Controlled With: Scheduled PO, PRN - IV and PRN - PO     Dysphagia Screen Completed: Yes: Fail  Dysphagia Screening  Vocal Quality/Secretions: (!) Abnormal  History of Dysphagia: No  O2 Saturation: Normal  Alertness: Normal  Pre-Swallow Assessment Score: 1   Cleared by Speech for Mechanical Soft, Thin Liquids, Pills whole in applesauce     Patient has PEG, NG Tube, Feeding Tube: No     Medication orders per above route:  Yes     Nutrition Status: PO     NIH Stroke Scale Complete: Yes: 12     Frequency of Vital Signs: Every 4 hours     Frequency of Neuro Checks: Every 4 hours     Daily Education/Care Plan Updated: Yes     Bedside report to Sooqini

## 2019-12-19 NOTE — PROGRESS NOTES
OT note: Attempted to see pt this afternoon. Pt too lethargic to participate. Will re-attempt at a later date.   Shweta Stratton

## 2019-12-19 NOTE — PROGRESS NOTES
Hospitalist Note     Admit Date:  2019  9:07 AM   Name:  Jonathon Moss   Age:  53 y.o.  :  1975   MRN:  [id number]   PCP:  Myrna Jackson MD      HPI/Subjective:      52 y/o smoker with DM, HTN, who presented to ER  with L leg and arm weakness, L facial droop, dysarthria. First noted L leg weakness late night  when he woke up to go to the bathroom. He was normal when he went to bed around 1030. Woke up and had persistent weakness L leg and also now noted in L arm. EMS called. Noted to have slurred speech and L facial droop as well. Code S called in ER around 9am.  MRI with acute infarcts in L cerebellar hemisphere, deep frontoparietal white matter, and R paramedian yariel. Also noted old lacunar infarcts. No large vessel occlusion on CTA, but some stenosis noted. No hx afib, TIA, CVA. No CP, palpitations, SOB. Today, no ac events, tolerated po, no new neuro spx or improvement, no changes, improved dry cough, no SOB. Feels welll    Objective:     Patient Vitals for the past 24 hrs:   Temp Pulse Resp BP SpO2   19 1200 97.7 °F (36.5 °C) (!) 59 18 105/74 91 %   19 0800 98 °F (36.7 °C) 63 17 121/79 94 %   19 0524  71  149/90    19 0400 98.1 °F (36.7 °C) 65 18 162/82 95 %   19 0000 98.5 °F (36.9 °C) 60 16 138/89 90 %   19 2000 97.6 °F (36.4 °C) (!) 58 16 135/75 90 %   19 1600 97.6 °F (36.4 °C) 73 17 139/68 97 %     Oxygen Therapy  O2 Sat (%): 91 % (19 1200)  Pulse via Oximetry: 73 beats per minute (19 1615)    Intake/Output Summary (Last 24 hours) at 2019 1430  Last data filed at 2019 0900  Gross per 24 hour   Intake 325 ml   Output    Net 325 ml       Physical Exam:  General:    Well nourished. Alert and oriented. CV:   RRR. No murmur, rub, or gallop. Lungs:  Clear to auscultation bilaterally. No wheezing, rhonchi, or rales  Extremities: Warm and dry. No cyanosis or edema.   Neurologic: CN II-XII intact except for mild L facial droop. Sensation intact. Finger to nose intact. PERRLA. Mild slurred speech. No confusion. STR 1/5 LUE and LLE. Skin:     No rashes or jaundice. No wounds. Psych:  Normal mood and affect. I reviewed the labs, imaging, EKGs, telemetry, and other studies done this admission.   Data Review:   Recent Results (from the past 24 hour(s))   GLUCOSE, POC    Collection Time: 12/18/19  4:36 PM   Result Value Ref Range    Glucose (POC) 114 (H) 65 - 100 mg/dL   GLUCOSE, POC    Collection Time: 12/18/19  5:00 PM   Result Value Ref Range    Glucose (POC) 105 (H) 65 - 100 mg/dL   GLUCOSE, POC    Collection Time: 12/18/19  7:13 PM   Result Value Ref Range    Glucose (POC) 105 (H) 65 - 100 mg/dL   GLUCOSE, POC    Collection Time: 12/18/19  8:41 PM   Result Value Ref Range    Glucose (POC) 86 65 - 100 mg/dL   GLUCOSE, POC    Collection Time: 12/19/19  7:15 AM   Result Value Ref Range    Glucose (POC) 147 (H) 65 - 100 mg/dL   GLUCOSE, POC    Collection Time: 12/19/19 11:32 AM   Result Value Ref Range    Glucose (POC) 210 (H) 65 - 100 mg/dL       All Micro Results     None          Current Facility-Administered Medications   Medication Dose Route Frequency    guaiFENesin-codeine (ROBITUSSIN AC) 100-10 mg/5 mL solution 10 mL  10 mL Oral Q4H PRN    metoprolol tartrate (LOPRESSOR) tablet 100 mg  100 mg Oral BID    insulin glargine (LANTUS) injection 12 Units  12 Units SubCUTAneous DAILY    insulin lispro (HUMALOG) injection 5 Units  5 Units SubCUTAneous TIDAC    fentaNYL citrate (PF) injection  mcg   mcg IntraVENous Multiple    midazolam (VERSED) injection 0.5-5 mg  0.5-5 mg IntraVENous Multiple    magnesium oxide (MAG-OX) tablet 400 mg  400 mg Oral BID    hydrALAZINE (APRESOLINE) 20 mg/mL injection 20 mg  20 mg IntraVENous Q4H PRN    atorvastatin (LIPITOR) tablet 80 mg  80 mg Oral QHS    amLODIPine (NORVASC) tablet 10 mg  10 mg Oral DAILY    cloNIDine HCl (CATAPRES) tablet 0.1 mg  0.1 mg Oral Q4H PRN    lisinopril (PRINIVIL, ZESTRIL) tablet 40 mg  40 mg Oral DAILY    sodium chloride (NS) flush 5-40 mL  5-40 mL IntraVENous Q8H    sodium chloride (NS) flush 5-40 mL  5-40 mL IntraVENous PRN    ondansetron (ZOFRAN) injection 4 mg  4 mg IntraVENous Q4H PRN    aspirin chewable tablet 81 mg  81 mg Oral DAILY    acetaminophen (TYLENOL) tablet 650 mg  650 mg Oral Q4H PRN    polyethylene glycol (MIRALAX) packet 17 g  17 g Oral DAILY PRN    enoxaparin (LOVENOX) injection 40 mg  40 mg SubCUTAneous Q24H    insulin lispro (HUMALOG) injection   SubCUTAneous AC&HS    dextrose 40% (GLUTOSE) oral gel 1 Tube  15 g Oral PRN    glucagon (GLUCAGEN) injection 1 mg  1 mg IntraMUSCular PRN    dextrose (D50W) injection syrg 12.5-25 g  25-50 mL IntraVENous PRN       Other Studies:  No results found. Results for orders placed or performed during the hospital encounter of 19   2D ECHO COMPLETE ADULT (TTE) [address]  [address] [facility] [address] [facility]  [phone]    Transthoracic Echocardiogram  2D, M-mode, Doppler, and Color Doppler    Patient: Sam Thomas  MR #: [id number]  : 1975  Age: 53 years  Gender: Male  Study date: 12-Dec-2019  Account #: [de-identified]  Height: 66 in  Weight: 209.7 lb  BSA: 2.04 mï¾²  Status:Routine  Location: ER03  BP: 133/ 76    Allergies: NO KNOWN ALLERGENS    Sonographer:  LUZ So  Group:  [facility]  Referring Physician:  Marli Kay. Krista Salgado MD  Reading Physician:  Cherie Siegel MD [facility]    INDICATIONS: CVA    PROCEDURE: This was a routine study. A transthoracic echocardiogram was  performed. The study included complete 2D imaging, M-mode, complete spectral  Doppler, and color Doppler. Intravenous contrast (Definity) was administered. Image quality was adequate.     LEFT VENTRICLE: Size was normal. Systolic function was normal. Ejection  fraction was estimated in the range of 65 % to 70 %. There were no regional  wall motion abnormalities. There was mild concentric hypertrophy. Left  ventricular diastolic function parameters were normal. Avg E/e': 11.05.    RIGHT VENTRICLE: The size was normal. Systolic function was normal. The  tricuspid jet envelope definition was inadequate for estimation of RV   systolic  pressure. LEFT ATRIUM: Size was normal.    ATRIAL SEPTUM: Agitated saline contrast injection (bubble study) was   performed. There was a right-to-left shunt, in the baseline state. RIGHT ATRIUM: Size was normal.    SYSTEMIC VEINS: IVC: The inferior vena cava was normal in size and course. The  respirophasic change in diameter was more than 50%. AORTIC VALVE: The valve was trileaflet. Leaflets exhibited mild sclerosis. There was no evidence for stenosis. There was no insufficiency. MITRAL VALVE: Valve structure was normal. There was no evidence for stenosis. There was no regurgitation. TRICUSPID VALVE: The valve structure was normal. There was no evidence for  stenosis. There was trivial regurgitation. PULMONIC VALVE: Not well visualized. There was no evidence for stenosis. There  was no insufficiency. PERICARDIUM: There was no pericardial effusion. AORTA: The root exhibited normal size. SUMMARY:    -  Left ventricle: Systolic function was normal. Ejection fraction was  estimated in the range of 65 % to 70 %. There were no regional wall motion  abnormalities. There was mild concentric hypertrophy. -  Atrial septum: Agitated saline contrast injection (bubble study) was  performed. There was a right-to-left shunt, in the baseline state. -  Inferior vena cava, hepatic veins: The respirophasic change in diameter   was  more than 50%.     SYSTEM MEASUREMENT TABLES    2D mode  AoR Diam (2D): 2.8 cm  LA Dimension (2D): 3.5 cm  Left Atrium Systolic Volume Index; Method of Disks, Biplane; 2D mode;: 26   ml/m2  IVS/LVPW (2D): 1  IVSd (2D): 1.2 cm  LVIDd (2D): 5.2 cm  LVIDs (2D): 2.9 cm  LVOT Area (2D): 2.3 cm2  LVPWd (2D): 1.2 cm  RVIDd (2D): 2.2 cm    Tissue Doppler Imaging  LV Peak Early Thomas Tissue Rc; Lateral MA (TDI): 8.1 cm/s  LV Peak Early Thomas Tissue Rc; Medial MA (TDI): 7.6 cm/s    Unspecified Scan Mode  Peak Grad; Mean; Antegrade Flow: 14 mm[Hg]  Vmax; Antegrade Flow: 185 cm/s  LVOT Diam: 1.7 cm  MV Peak Rc/LV Peak Tissue Rc E-Wave; Lateral MA: 10.7  MV Peak Rc/LV Peak Tissue Rc E-Wave; Medial MA: 11.4    Prepared and signed by    Dennis Bass. Jenny Stein MD [facility]  Signed 12-Dec-2019 15:46:58             Assessment and Plan:     Dx:  1- Acute embolic stroke, peristent weakness on left side.  Pt has PFO w/ large shunt  2- Hypertension, controlled  3- Hypokalemia and hypomagnesemia, replaced    Rx:  ASA  Statin and Omega 3  Neuro checks  Follow cardiology input  Neuro follow  Plan closure of PFO/ shunt 4 wks after DC    Dispo; need rehab, no insurance?, CM following, d/w CM    Signed:  Wilver Monterroso MD

## 2019-12-19 NOTE — PROGRESS NOTES
Neurolinguistic Goals:  LTG: Patient will increase neuro-linguistic abilities to increase safety and awareness in functional living environment   STG: Patient will participate in speech/language/cognitive evaluation.  MET 12/17/19  STG: Patient will solve mathematical problems with 80%accuracy given minimal cueing   STG: Patient will name 10 items to concrete category in 1 minute given minimal cueing  STG: Patient will participate in verbal reasoning tasks with 80% accuracy given minimal cueing  STG: Patient will complete mental manipulation tasks with 80% accuracy given minimal cueing  STG: Patient will complete working memory/attention tasks with 80% accuracy given minimal cueing  STG: Patient will recall relevant verbal information with 80% accuracy with minimal assistance  STG: Patient will utilize memory compensatory strategies to improve recall of functional list/message with 90%accuracy given minimal assistance  STG: Patient will participate in ongoing cognitive linguistic evaluation for therapeutic assessment      Dysarthria Goals:  LTG: Patient will develop functional and intelligible speech and utilize compensatory strategies to improve communication across environments  STG: Patient will fill in carrier phrase/complete automatic speech tasks with 95% accuracy given minimal cueing  STG: Patient will repeat phrases, sentences with 85% accuracy given minimal cueing  STG: Patient will utilize compensatory strategies across tasks with 90% accuracy given minimal cueing    SPEECH LANGUAGE PATHOLOGY: SPEECH-LANGUAGE/COGNITION: Daily Note 2    NAME/AGE/GENDER: Gifty Johnson is a 53 y.o. male  DATE: 12/19/2019  PRIMARY DIAGNOSIS: Acute ischemic stroke ([facility] [address] [I63.9]  Cerebrovascular accident (CVA) due to embolism ([facility] [address] [I63.9]      ICD-10: Treatment Diagnosis: R47.1 Dysarthria and Anarthria  R41.841 Cognitive-Communication Deficit    INTERDISCIPLINARY COLLABORATION: Registered Nurse  PRECAUTIONS/ALLERGIES: Patient has no known allergies. SUBJECTIVE   Upright in bed finishing meal tray. Mainly consumed grits-eggs and hashbrowns essentially untouched. Speech significantly dysarthric. Problem List:  (Impairments causing functional limitations):  1. Cognitive linguistic deficits  2. Dysarthria     Orientation:   Person  Place  Time  Situation     Pain: Pain Scale 1: Numeric (0 - 10)  Pain Intensity 1: 0     OBJECTIVE   The following treatment tasks were completed:   Completed with use of dysarthria compensatory strategies-  Confrontational naming-6/6 with min cueing   Orientation-1/4 with min cues; 4/4 with moderate cueing   Phrase repetition-2/5 independently; 5/5 with moderate cueing   Opposites-4/6 independently; 6/6 with min cues   Divergent naming (concrete)-  -category 1: 8 items in 1 minute with min cues for generative thinking and min-mod cues for strategy implementation  -category 2: 5 items in 1 minute with min cues for generative thinking and moderate cueing strategy implementation     Recall of strategies from previous session- 0%  Recall of strategies 5 min after rehearsal-1/3 with min cues; increasing to 2/3 with field of 2        ASSESSMENT   Patient presents with cognitive linguistic deficits and moderate dysarthria. Unable to independently recall deficits targeted in speech therapy. Patient requires min-moderate cueing to carryover dysarthria strategies across structured tasks and mod-max to carryover in unstructured tasks. Patient's volume significantly low and minimal labial movement requiring verbal cueing and models to over exaggerate and increase volume. Patient with decreased verbal fluency during divergent naming requiring prompt for subcategory. Impaired short term memory of dysarthria strategies despite significant rehearsal and utilization during this session and previous session.      Patient will benefit from ongoing skilled intervention to improve speech intelligibility and cognitive linguistic skills. Encouraged wife and patient to practice carryover of strategies throughout day. Tool Used: MODIFIED BRITT SCALE (mRS)   Score   No Symptoms  [] 0   No significant disability despite symptoms; able to carry out all usual duties and activities  [] 1   Slight disability; unable to carry out all previous activities but able to look after own affairs without assistance. [] 2   Moderate disability; requiring some help but able to walk without assistance  [] 3   Moderately severe disability; unable to walk without assistance and unable to attend to own bodily needs without assistance  [] 4   Severe disability; bedridden, incontinent, and requiring constant nursing care and attention  [] 5      Score:  Initial: 2 Most Recent:  (Date 12/19/19 )   Interpretation of Tool: The Modified Mathews Scale is a 7-point scaled used to quantify level of disability as it relates to a patient's functional abilities. PLAN    FREQUENCY/DURATION: Continue to follow patient 3 times a week for duration of hospital stay to address above goals. - Recommendations for next treatment session: Next treatment will address cognitive linguistic and dysarthria treatment   REHABILITATION POTENTIAL FOR STATED GOALS: Excellent           RECOMMENDATIONS   STRATEGIES:   Dysarthria strategies  Memory strategies      EDUCATION:  · Recommendations discussed with Nursing  · Family  · Patient     RECOMMENDATIONS for CONTINUED SPEECH THERAPY: YES: Anticipate need for ongoing speech therapy during this hospitalization and at next level of care. Compliance with Program/Exercises: Will assess as treatment progresses  Continuation of Skilled Services/Medical Necessity:   Patient is expected to demonstrate progress in cognitive ability and dysarthria to decrease assistance required communication, increase independence with activities of daily living and increase communication with family/caregivers.    Patient continues to require skilled intervention due to dysarthria and cognitive linguistic deficits.      SAFETY:  After treatment position/precautions:  · Upright in bed  · RN notified  · wife at bedside  · Call light within reach    Total Treatment Duration:   Time In: 2340  Time Out: [address] [facility], [facility], [facility] [employer], CCC-SLP

## 2019-12-19 NOTE — PROGRESS NOTES
12/18/19 1933   NIH Stroke Scale   Interval Other (comment)   LOC 0   LOC Questions 0   LOC Commands 0   Best Gaze 1   Visual 0   Facial Palsy 2   Motor Right Arm 0   Motor Left Arm 4   Motor Right Leg 0   Motor Left Leg 4   Limb Ataxia 0   Sensory 0   Best Language 0   Dysarthria 1   Extinction and Inattention 0   Total 12   [facility] bedside with Sher Cheney RN

## 2019-12-20 LAB
GLUCOSE BLD STRIP.AUTO-MCNC: 122 MG/DL (ref 65–100)
GLUCOSE BLD STRIP.AUTO-MCNC: 127 MG/DL (ref 65–100)
GLUCOSE BLD STRIP.AUTO-MCNC: 140 MG/DL (ref 65–100)
GLUCOSE BLD STRIP.AUTO-MCNC: 145 MG/DL (ref 65–100)

## 2019-12-20 PROCEDURE — 74011250637 HC RX REV CODE- 250/637: Performed by: INTERNAL MEDICINE

## 2019-12-20 PROCEDURE — 97530 THERAPEUTIC ACTIVITIES: CPT

## 2019-12-20 PROCEDURE — 65270000029 HC RM PRIVATE

## 2019-12-20 PROCEDURE — 74011250636 HC RX REV CODE- 250/636: Performed by: INTERNAL MEDICINE

## 2019-12-20 PROCEDURE — 65660000000 HC RM CCU STEPDOWN

## 2019-12-20 PROCEDURE — 99232 SBSQ HOSP IP/OBS MODERATE 35: CPT | Performed by: PHYSICAL MEDICINE & REHABILITATION

## 2019-12-20 PROCEDURE — 82962 GLUCOSE BLOOD TEST: CPT

## 2019-12-20 PROCEDURE — 74011250637 HC RX REV CODE- 250/637: Performed by: HOSPITALIST

## 2019-12-20 PROCEDURE — 97112 NEUROMUSCULAR REEDUCATION: CPT

## 2019-12-20 RX ORDER — INSULIN LISPRO 100 [IU]/ML
4 INJECTION, SOLUTION INTRAVENOUS; SUBCUTANEOUS
Status: DISCONTINUED | OUTPATIENT
Start: 2019-12-20 | End: 2019-12-26

## 2019-12-20 RX ADMIN — GUAIFENESIN AND CODEINE PHOSPHATE 10 ML: 10; 100 LIQUID ORAL at 13:43

## 2019-12-20 RX ADMIN — AMLODIPINE BESYLATE 10 MG: 10 TABLET ORAL at 08:06

## 2019-12-20 RX ADMIN — Medication 10 ML: at 20:41

## 2019-12-20 RX ADMIN — METOPROLOL TARTRATE 100 MG: 50 TABLET, FILM COATED ORAL at 08:06

## 2019-12-20 RX ADMIN — METOPROLOL TARTRATE 100 MG: 50 TABLET, FILM COATED ORAL at 16:32

## 2019-12-20 RX ADMIN — ASPIRIN 81 MG 81 MG: 81 TABLET ORAL at 08:06

## 2019-12-20 RX ADMIN — Medication 400 MG: at 16:32

## 2019-12-20 RX ADMIN — GUAIFENESIN AND CODEINE PHOSPHATE 10 ML: 10; 100 LIQUID ORAL at 20:40

## 2019-12-20 RX ADMIN — Medication 10 ML: at 14:50

## 2019-12-20 RX ADMIN — Medication 10 ML: at 05:49

## 2019-12-20 RX ADMIN — ENOXAPARIN SODIUM 40 MG: 40 INJECTION SUBCUTANEOUS at 13:43

## 2019-12-20 RX ADMIN — Medication 400 MG: at 08:06

## 2019-12-20 RX ADMIN — LISINOPRIL 40 MG: 20 TABLET ORAL at 08:07

## 2019-12-20 RX ADMIN — ATORVASTATIN CALCIUM 80 MG: 80 TABLET, FILM COATED ORAL at 20:40

## 2019-12-20 NOTE — PROGRESS NOTES
Problem: Mobility Impaired (Adult and Pediatric)  Goal: *Acute Goals and Plan of Care  Description  STG:  (1.) Mr. Patty Brown will move from supine to sit and sit to supine , scoot up and down and roll side to side with MINIMAL ASSIST within 3 treatment day(s). (2.) Mr. Patty Brown will transfer from bed to chair and chair to bed with MODERATE ASSIST using the least restrictive device within 3 treatment day(s). (3.) Mr. Patty Brown will perform standing static and dynamic balance activities x 10 minutes with MODERATE ASSISTx1 to improve safety within 3 treatment day(s). LTG:  (1.) Mr. Patty Brown will move from supine to sit and sit to supine , scoot up and down and roll side to side with CONTACT GUARD ASSIST within 7 treatment day(s). (2.) Mr. Patty Brown will transfer from bed to chair and chair to bed with MINIMAL ASSIST using the least restrictive device within 7 treatment day(s). (3.) Mr. Patty Brown will ambulate with MODERATE ASSIST for 50+ feet with the least restrictive device within 7 treatment day(s). (4.) Mr. Patty Brown will perform standing static and dynamic balance activities x 20 minutes with MINIMAL ASSIST to improve safety within 7 treatment day(s). (5.) Mr. Patty Brown will perform bilateral lower extremity exercises x 15 min for HEP with SUPERVISION to improve strength, endurance, and functional mobility within 7 treatment day(s).      PHYSICAL THERAPY: Daily Note and PM 12/20/2019  INPATIENT: PT Visit Days : 4  Payor: MEDICAID PENDING / Plan: Keven Spink PENDING / Product Type: Medicaid /       NAME/AGE/GENDER: Kervin Milian is a 53 y.o. male   PRIMARY DIAGNOSIS: Acute ischemic stroke ([address] [I63.9]  Cerebrovascular accident (CVA) due to embolism ([address] [I63.9] Acute ischemic stroke ([address] Acute ischemic stroke ([facility]       ICD-10: Treatment Diagnosis:   · Other lack of cordination (R27.8)  · Difficulty in walking, Not elsewhere classified (R26.2)  · Other abnormalities of gait and mobility (R26.89)  · Unspecified Lack of Coordination (R27.9)  · Hemiplegia and hemiparesis following cerebral infarction affecting   · left non-dominant side (W49.061)   Precaution/Allergies:  Patient has no known allergies. ASSESSMENT:     Mr. Naun Hampton is a 53year old M who presents to hospital with L sided facial droop and LUE and LLE weakness. MRI indicated acute infarcts in L cerebellar hemisphere, deep frontoparietal white matter and R paramedian yariel. Prior to hospital admission pt lives with his wife in a one story apartment on the second level with 12 steps to access. Pt endorses no falls in past 6 months. Prior to admission Mr. Naun Hampton uses no DME for mobility. He reports he had been working full time but got laid off, so now he works just a few days at [employer], which requires him being on his feet. Upon entering, pt resting in bed, wife at bedside and agreeable to therapy with note able speech impairment. He needs maxA help L LE over EOB, and modA to roll L and maxAto finish trnf to EOB. He worked on his static sitting balance with R side lean + recovery to midline. Pt required modA at beginning, but able to hold self balanced for max 1min before needing modA to correct. Sat EOB for 20min. attempted sit - stand x2, but pt unable to extend hips and stand straight. Pt improves with progress, but slowly. will need aggressive therapy at [facility] if possible to maximize his rehab potential for all therapies. This section established at most recent assessment   PROBLEM LIST (Impairments causing functional limitations):  1. Decreased Strength  2. Decreased ADL/Functional Activities  3. Decreased Transfer Abilities  4. Decreased Ambulation Ability/Technique  5. Decreased Balance  6. Increased Pain  7. Decreased Activity Tolerance  8. Increased Fatigue  9. Decreased Flexibility/Joint Mobility   INTERVENTIONS PLANNED: (Benefits and precautions of physical therapy have been discussed with the patient.)  1.  Balance Exercise  2. Bed Mobility  3. Family Education  4. Gait Training  5. Home Exercise Program (HEP)  6. Manual Therapy  7. Neuromuscular Re-education/Strengthening  8. Range of Motion (ROM)  9. Therapeutic Activites  10. Therapeutic Exercise/Strengthening  11. Transfer Training     TREATMENT PLAN: Frequency/Duration: 3 times a week for duration of hospital stay  Rehabilitation Potential For Stated Goals: [facility] (at time of discharge pending progress):    Placement: It is my opinion, based on this patient's performance to date, that Mr. Rui Courtney may benefit from intensive therapy at an [address] [place] [address] after discharge due to a probable need for 24 hour rehab nursing, a probable need for multiple therapy disciplines, and potential to make ongoing and sustainable functional improvement that is of practical value. .  Equipment:    TBD pending progress with therapy. HISTORY:   History of Present Injury/Illness (Reason for Referral):  Per H&P: \"Pt is a 52 y/o smoker with DM, HTN, who presented to ER with L leg and arm weakness, L facial droop, dysarthria. First noted L leg weakness late night 12/11 when he woke up to go to the bathroom. He was normal when he went to bed around 1030. Woke up this morning and had persistent weakness L leg and also now noted in L arm. EMS called. Noted to have slurred speech and L facial droop as well. Code S called in ER around 9am.  MRI with acute infarcts in L cerebellar hemisphere, deep frontoparietal white matter, and R paramedian yariel. Also noted old lacunar infarcts. No large vessel occlusion on CTA, but some stenosis noted. No hx afib, TIA, CVA. No CP, palpitations, SOB. \"  Past Medical History/Comorbidities:   Mr. Rui Courtney  has a past medical history of Acute ischemic stroke ([address] (12/12/2019), Acute pancreatitis (11/19/2014), Cerebrovascular accident (CVA) due to embolism ([address] (12/12/2019), Diabetes ([facility] (2002), Diabetes ([facility] Diabetes mellitus, and Hypertension. He also has no past medical history of Arthritis, Asthma, Autoimmune disease ([facility] CAD (coronary artery disease), Cancer ([facility] Chronic kidney disease, COPD, Dementia, Dementia ([facility] Heart failure ([facility] [address] Ill-defined condition, Infectious disease, Liver disease, Other ill-defined conditions(799.89), Psychiatric disorder, PUD (peptic ulcer disease), Seizures ([facility] or Sleep disorder. Mr. Latrell Collins  has a past surgical history that includes hx hernia repair and hx orthopaedic. Social History/Living Environment:   Home Environment: Apartment  # Steps to Enter: 12  Rails to Enter: Yes  [employer] : Bilateral  One/Two Story Residence: One story  Living Alone: No  Support Systems: Spouse/Significant Other/Partner  Patient Expects to be Discharged to[de-identified] Unknown  Current DME Used/Available at Home: None  Tub or Shower Type: Tub/Shower combination  Prior Level of Function/Work/Activity:  Independent, lives with wife in 2nd story 1 level apartment. No recent falls. Number of Personal Factors/Comorbidities that affect the Plan of Care: 1-2: MODERATE COMPLEXITY   EXAMINATION:   Most Recent Physical Functioning:   Gross Assessment:                  Posture:     Balance:  Sitting - Static: Fair (occasional); Poor (constant support)  Sitting - Dynamic: Fair (occasional); Poor (constant support)  Standing: Impaired  Standing - Static: Poor  Standing - Dynamic : Poor Bed Mobility:  Rolling: Maximum assistance  Supine to Sit: Maximum assistance  Wheelchair Mobility:     Transfers:  Sit to Stand: Maximum assistance; Additional time  Stand to Sit: Maximum assistance; Additional time  Gait:            Body Structures Involved:  1. Nerves  2. Voice/Speech  3. Bones  4. Joints  5. Muscles Body Functions Affected:  1. Mental  2. Sensory/Pain  3. Neuromusculoskeletal  4. Movement Related Activities and Participation Affected:  1. General Tasks and Demands  2. Mobility  3.  Self Care  4. Interpersonal Interactions and Relationships   Number of elements that affect the Plan of Care: 4+: HIGH COMPLEXITY   CLINICAL PRESENTATION:   Presentation: Evolving clinical presentation with changing clinical characteristics: MODERATE COMPLEXITY   CLINICAL DECISION MAKIN [facility] Inpatient Short Form  How much difficulty does the patient currently have. .. Unable A Lot A Little None   1. Turning over in bed (including adjusting bedclothes, sheets and blankets)? [] 1   [] 2   [x] 3   [] 4   2. Sitting down on and standing up from a chair with arms ( e.g., wheelchair, bedside commode, etc.)   [] 1   [x] 2   [] 3   [] 4   3. Moving from lying on back to sitting on the side of the bed? [] 1   [x] 2   [] 3   [] 4   How much help from another person does the patient currently need. .. Total A Lot A Little None   4. Moving to and from a bed to a chair (including a wheelchair)? [] 1   [x] 2   [] 3   [] 4   5. Need to walk in hospital room? [] 1   [x] 2   [] 3   [] 4   6. Climbing 3-5 steps with a railing? [] 1   [x] 2   [] 3   [] 4   © , Trustees of [address] [place] [address] [address] [id number], under license to [employer]. All rights reserved      Score:  Initial: 13 Most Recent: X (Date: -- )    Interpretation of Tool:  Represents activities that are increasingly more difficult (i.e. Bed mobility, Transfers, Gait). Medical Necessity:     · Patient is expected to demonstrate progress in   · strength, range of motion, balance, coordination, and functional technique  ·  to   · increase independence with all mobility. · .  Reason for Services/Other Comments:  · Patient continues to require skilled intervention due to   · medical complications and mobility deficits which impact his level of function, safety, and independence as indicated above.    · .   Use of outcome tool(s) and clinical judgement create a POC that gives a: Questionable prediction of patient's progress: MODERATE COMPLEXITY        TREATMENT:   (In addition to Assessment/Re-Assessment sessions the following treatments were rendered)   Pre-treatment Symptoms/Complaints:  \"ok\"  Pain: Initial:   Pain Intensity 1: 0  Post Session:  No pain reported at end of session, resting comfortably in bed. Therapeutic Activity: (    38 Minutes): Therapeutic activities including bed mobility, rolling, scooting, sit <> stand transfer training  bed to improve mobility, strength, balance, and coordination. Required maximal cues with moderate physical assist   to promote coordination of bilateral, upper extremity(s), lower extremity(s) and promote motor control of bilateral, upper extremity(s), lower extremity(s). Neuromuscular Re-education: (  0Minutes):  Sitting static and dynamic balance activities, and static standing balance control training and dynamic standing balance control training to improve balance, coordination, kinesthetic sense, and posture. Required maximal visual, verbal, manual, and tactile cues to promote static and dynamic balance in standing, promote coordination of left, upper extremity(s), lower extremity(s) and promote motor control of left, upper extremity(s), lower extremity(s). Braces/Orthotics/Lines/Etc:   · O2 Device: Room Air   Treatment/Session Assessment:    · Response to Treatment:  see above. Works very hard with therapy. · Interdisciplinary Collaboration:   o Physical Therapist  o Registered Nurse  · After treatment position/precautions:   o Supine in bed  o Bed alarm/tab alert on  o Bed/Chair-wheels locked  o Bed in low position  o Call light within reach  o RN notified  o Family at bedside   · Compliance with Program/Exercises: Compliant all of the time  · Recommendations/Intent for next treatment session: \"Next visit will focus on advancements to more challenging activities and reduction in assistance provided\".     Total Treatment Duration:  PT Patient Time In/Time Out  Time In: 6432  Time Out: [address], DPT

## 2019-12-20 NOTE — PROGRESS NOTES
Problem: Pressure Injury - Risk of  Goal: *Prevention of pressure injury  Description  Document Dewey Scale and appropriate interventions in the flowsheet. Outcome: Progressing Towards Goal  Note: Pressure Injury Interventions:  Sensory Interventions: Assess changes in LOC, Check visual cues for pain    Moisture Interventions: Absorbent underpads, Apply protective barrier, creams and emollients    Activity Interventions: Increase time out of bed, PT/OT evaluation    Mobility Interventions: HOB 30 degrees or less, Float heels, PT/OT evaluation, Suspension boots    Nutrition Interventions: Document food/fluid/supplement intake, Discuss nutritional consult with provider, Offer support with meals,snacks and hydration    Friction and Shear Interventions: HOB 30 degrees or less, Minimize layers                Problem: Patient Education: Go to Patient Education Activity  Goal: Patient/Family Education  Outcome: Progressing Towards Goal     Problem: Falls - Risk of  Goal: *Absence of Falls  Description  Document Jeanne Fall Risk and appropriate interventions in the flowsheet.   Outcome: Progressing Towards Goal  Note: Fall Risk Interventions:  Mobility Interventions: Bed/chair exit alarm, OT consult for ADLs, Patient to call before getting OOB, PT Consult for mobility concerns    Mentation Interventions: Bed/chair exit alarm, Door open when patient unattended    Medication Interventions: Bed/chair exit alarm, Patient to call before getting OOB, Teach patient to arise slowly    Elimination Interventions: Bed/chair exit alarm, Call light in reach, Patient to call for help with toileting needs, Toileting schedule/hourly rounds    History of Falls Interventions: Bed/chair exit alarm, Door open when patient unattended, Consult care management for discharge planning         Problem: Patient Education: Go to Patient Education Activity  Goal: Patient/Family Education  Outcome: Progressing Towards Goal     Problem: Diabetes Self-Management  Goal: *Disease process and treatment process  Description  Define diabetes and identify own type of diabetes; list 3 options for treating diabetes. Outcome: Progressing Towards Goal  Goal: *Incorporating nutritional management into lifestyle  Description  Describe effect of type, amount and timing of food on blood glucose; list 3 methods for planning meals. Outcome: Progressing Towards Goal  Goal: *Incorporating physical activity into lifestyle  Description  State effect of exercise on blood glucose levels. Outcome: Progressing Towards Goal  Goal: *Developing strategies to promote health/change behavior  Description  Define the ABC's of diabetes; identify appropriate screenings, schedule and personal plan for screenings. Outcome: Progressing Towards Goal  Goal: *Using medications safely  Description  State effect of diabetes medications on diabetes; name diabetes medication taking, action and side effects. Outcome: Progressing Towards Goal  Goal: *Monitoring blood glucose, interpreting and using results  Description  Identify recommended blood glucose targets  and personal targets. Outcome: Progressing Towards Goal  Goal: *Prevention, detection, treatment of acute complications  Description  List symptoms of hyper- and hypoglycemia; describe how to treat low blood sugar and actions for lowering  high blood glucose level. Outcome: Progressing Towards Goal  Goal: *Prevention, detection and treatment of chronic complications  Description  Define the natural course of diabetes and describe the relationship of blood glucose levels to long term complications of diabetes.   Outcome: Progressing Towards Goal  Goal: *Developing strategies to address psychosocial issues  Description  Describe feelings about living with diabetes; identify support needed and support network  Outcome: Progressing Towards Goal     Problem: Patient Education: Go to Patient Education Activity  Goal: Patient/Family Education  Outcome: Progressing Towards Goal     Problem: Nutrition Deficit  Goal: *Optimize nutritional status  Outcome: Progressing Towards Goal     Problem: General Medical Care Plan  Goal: *Absence of infection signs and symptoms  Description  Wash hand more often   Outcome: Progressing Towards Goal  Goal: *Skin integrity maintained  Outcome: Progressing Towards Goal  Goal: *Fluid volume balance  Outcome: Progressing Towards Goal  Goal: *Anxiety reduced or absent  Outcome: Progressing Towards Goal  Goal: *Progressive mobility and function (eg: ADL's)  Outcome: Progressing Towards Goal

## 2019-12-20 NOTE — PROGRESS NOTES
Problem: Self Care Deficits Care Plan (Adult)  Goal: *Acute Goals and Plan of Care (Insert Text)  Description  1. Patient will complete sitting balance edge of bed with ADL tasks/OT activity with supervision. 2. Patient will demonstrate appropriate safety awareness and protection of L UE during bed mobility and functional transfers with minimal cues. 3. Patient will complete upper body bathing/dressing with minimal assistance to improve participation with ADL. 4. Patient will complete weightbearing into the L UE with ADL tasks with minimal assistance to improve ability to use as a functional assist during ADL tasks. 5. Patient will participation in 8 minutes of therapeutic exercises with moderate assistance to improve strength in the L UE for ADL/functional transfers. 6. Patient will attempt transfer to the chair/BSC within 3 visits to demonstrate advancement with functional transfers. Timeframe: 7 visits   Outcome: Progressing Towards Goal     OCCUPATIONAL THERAPY: Daily Note and PM    12/20/2019  INPATIENT: OT Visit Days: 2  Payor: MEDICAID PENDING / Plan: Cheri Mckeon PENDING / Product Type: Medicaid /      NAME/AGE/GENDER: Allie Winters is a 53 y.o. male   PRIMARY DIAGNOSIS:  Acute ischemic stroke ([address] [I63.9]  Cerebrovascular accident (CVA) due to embolism ([address] [I63.9] Acute ischemic stroke ([address] Acute ischemic stroke ([address]       ICD-10: Treatment Diagnosis:    · Generalized Muscle Weakness (M62.81)  · Other lack of cordination (R27.8)  · Hemiplegia and hemiparesis following cerebral infarction affecting   · left non-dominant side (I69.354)  · Abnormal posture (R29.3)   Precautions/Allergies:     Patient has no known allergies. ASSESSMENT:     Mr. Juvencio Saavedra presents to the hospital with L sided weakness and acute ischemic CVA. MRI revealed acute to subacute L cerebellar and R paramedian yariel infarcts. 12/20/2019 Pt presents in supine with his wife at his side.  Pt transferred to sitting with max/total assist x's 2. Pt with poor balance falling backward and to the R side. Neuro re-ed completed having pt complete weight bearing through his L UE requiring max a. Pt participated in extensive trunk re-ed to increase midline and maintaining midline. Pt needed max cueing throughout session to use his R UE to push into midline and and engaging core to maintain midline. Pt progressed to being to able to maintain midline for only a few seconds. Pt eventually also able to self correct a few times. Pt was eventually assisted back to supine with max a x's 2 with belongings in reach and L UE to propped onto pillow. Good effort. Continue POC. This section established at most recent assessment   PROBLEM LIST (Impairments causing functional limitations):  1. Decreased Strength  2. Decreased ADL/Functional Activities  3. Decreased Transfer Abilities  4. Decreased Ambulation Ability/Technique  5. Decreased Balance  6. Decreased Activity Tolerance  7. Increased Fatigue  8. Decreased Flexibility/Joint Mobility  9. Decreased Knowledge of Precautions  10. Decreased [place] with Home Exercise Program  11. Decreased Cognition   INTERVENTIONS PLANNED: (Benefits and precautions of occupational therapy have been discussed with the patient.)  1. Activities of daily living training  2. Adaptive equipment training  3. Balance training  4. Clothing management  5. Cognitive training  6. Donning&doffing training  7. Keanu tech training  8. Neuromuscular re-eduation  9. Therapeutic activity  10. Therapeutic exercise     TREATMENT PLAN: Frequency/Duration: Follow patient 3 times per week to address above goals. Rehabilitation Potential For Stated Goals: Excellent     REHAB RECOMMENDATIONS (at time of discharge pending progress):    Placement:   It is my opinion, based on this patient's performance to date, that Mr. Ellis Nair may benefit from intensive therapy at an [address] [place] [address] after discharge due to potential to make ongoing and sustainable functional improvement that is of practical value. .  Equipment:    TBD               OCCUPATIONAL PROFILE AND HISTORY:   History of Present Injury/Illness (Reason for Referral):  See H&P  Past Medical History/Comorbidities:   Mr. Jonathon Smith  has a past medical history of Acute ischemic stroke ([address] (12/12/2019), Acute pancreatitis (11/19/2014), Cerebrovascular accident (CVA) due to embolism ([address] (12/12/2019), Diabetes ([address] (2002), Diabetes ([address] Diabetes mellitus, and Hypertension. He also has no past medical history of Arthritis, Asthma, Autoimmune disease ([address] CAD (coronary artery disease), Cancer ([address] Chronic kidney disease, COPD, Dementia, Dementia ([address] Heart failure ([address] Ill-defined condition, Infectious disease, Liver disease, Other ill-defined conditions(799.89), Psychiatric disorder, PUD (peptic ulcer disease), Seizures ([address] or Sleep disorder. Mr. Jonathon Smith  has a past surgical history that includes hx hernia repair and hx orthopaedic. Social History/Living Environment:   Home Environment: Apartment  # Steps to Enter: 12  Rails to Enter: Yes  [employer] : Bilateral  One/Two Story Residence: One story  Living Alone: No  Support Systems: Spouse/Significant Other/Partner  Patient Expects to be Discharged to[de-identified] Unknown  Current DME Used/Available at Home: None  Tub or Shower Type: Tub/Shower combination  Prior Level of Function/Work/Activity:  Pt lives at home with his wife. Pt is typically independent with ADL/functional mobility. Pt does not drive. Pt was working part-time at [employer]. Personal Factors:          Age:  53 y.o.         Past/Current Experience:  CVA with flaccid L side        Other factors that influence how disability is experienced by the patient:  multiple co-morbidities    Number of Personal Factors/Comorbidities that affect the Plan of Care: Extensive review of physical, cognitive, and psychosocial performance (3+):  HIGH COMPLEXITY ASSESSMENT OF OCCUPATIONAL PERFORMANCE[de-identified]   Activities of Daily Living:   Basic ADLs (From Assessment) Complex ADLs (From Assessment)   Feeding: Stand-by assistance  Oral Facial Hygiene/Grooming: Moderate assistance  Bathing: Maximum assistance  Upper Body Dressing: Maximum assistance  Lower Body Dressing: Total assistance  Toileting: Total assistance Instrumental ADL  Meal Preparation: Total assistance  Homemaking: Total assistance   Grooming/Bathing/Dressing Activities of Daily Living                             Bed/Mat Mobility  Rolling: Maximum assistance  Supine to Sit: Maximum assistance  Sit to Stand: Maximum assistance; Additional time  Stand to Sit: Maximum assistance; Additional time     Most Recent Physical Functioning:   Gross Assessment:                  Posture:     Balance:  Sitting - Static: Fair (occasional); Poor (constant support)  Sitting - Dynamic: Fair (occasional); Poor (constant support)  Standing: Impaired  Standing - Static: Poor  Standing - Dynamic : Poor Bed Mobility:  Rolling: Maximum assistance  Supine to Sit: Maximum assistance  Wheelchair Mobility:     Transfers:  Sit to Stand: Maximum assistance; Additional time  Stand to Sit: Maximum assistance; Additional time            Patient Vitals for the past 6 hrs:   BP BP Patient Position SpO2 Pulse   12/20/19 1200 142/86 At rest 95 % (!) 56       Mental Status  Neurologic State: Drowsy, Alert  Orientation Level: Oriented X4  Cognition: Follows commands  Perception: Cues to attend to left side of body, Cues to maintain midline in standing, Tactile, Verbal, Visual  Perseveration: No perseveration noted  Safety/Judgement: Fall prevention                          Physical Skills Involved:  1. Range of Motion  2. Balance  3. Strength  4. Activity Tolerance  5. Fine Motor Control  6. Gross Motor Control Cognitive Skills Affected (resulting in the inability to perform in a timely and safe manner):  1. Perception  2.  Expression Psychosocial Skills Affected:  1. Habits/Routines  2. Self-Awareness   Number of elements that affect the Plan of Care: 5+:  HIGH COMPLEXITY   CLINICAL DECISION MAKIN[address] [place] [address] AM-PAC 6 Clicks   Daily Activity Inpatient Short Form  How much help from another person does the patient currently need. .. Total A Lot A Little None   1. Putting on and taking off regular lower body clothing? [x] 1   [] 2   [] 3   [] 4   2. Bathing (including washing, rinsing, drying)? [] 1   [x] 2   [] 3   [] 4   3. Toileting, which includes using toilet, bedpan or urinal?   [x] 1   [] 2   [] 3   [] 4   4. Putting on and taking off regular upper body clothing? [] 1   [x] 2   [] 3   [] 4   5. Taking care of personal grooming such as brushing teeth? [] 1   [x] 2   [] 3   [] 4   6. Eating meals? [] 1   [] 2   [x] 3   [] 4   © , Trustees of [address] [place] [address], under license to [employer]. All rights reserved      Score:  Initial: 11 Most Recent: X (Date: -- )    Interpretation of Tool:  Represents activities that are increasingly more difficult (i.e. Bed mobility, Transfers, Gait). Medical Necessity:     · Patient demonstrates   · good and excellent  ·  rehab potential due to higher previous functional level. Reason for Services/Other Comments:  · Patient continues to require skilled intervention due to   · Decreased independence with ADL/functional transfers that impacts overall quality of life. · .   Use of outcome tool(s) and clinical judgement create a POC that gives a: MODERATE COMPLEXITY         TREATMENT:   (In addition to Assessment/Re-Assessment sessions the following treatments were rendered)     Pre-treatment Symptoms/Complaints:    Pain: Initial:   Pain Intensity 1: 0  Post Session:  0       Neuromuscular Re-education: (43 minutes):  Exercise/activities per grid below to improve balance, coordination, kinesthetic sense, posture and proprioception.   Required maximal visual, verbal and manual cues to promote motor control of right, upper extremity(s), trunk. Re-Education Training  Re-Education Training: Yes   LLE Re-Education  Other Activities: lateral weight bearing through his L UE to increase biofeedback and proprioceptionTrunk Re-Education  Muscle Facilitation: head and trunk extension  Muscle Inhibition: trunk flexion and left lateral lean  Integration Activities: R UE to increase midline        Re-Education Training  Re-Education Training: Yes   LLE Re-Education  Other Activities: lateral weight bearing through his L UE to increase biofeedback and proprioceptionTrunk Re-Education  Muscle Facilitation: head and trunk extension  Muscle Inhibition: trunk flexion and left lateral lean  Integration Activities: R UE to increase midline   Date:  12/15 Date:   Date:     Activity/Exercise Parameters Parameters Parameters   Shifting weight side to side and weightbearing into L UE 10 reps      Trunk flexion   5 reps      Trunk extension to upright posture 5 reps      Dynamic reaching to the L 15 reps      Identifying and visually attending to L side 6 reps                      Braces/Orthotics/Lines/Etc:   ·  None  Treatment/Session Assessment:    · Response to Treatment:  Pt demonstrated good participation. · Interdisciplinary Collaboration:   o Physical Therapist  o Certified Occupational Therapy Assistant  o Registered Nurse  · After treatment position/precautions:   o Supine in bed  o Bed/Chair-wheels locked  o Call light within reach  o RN notified  o Family at bedside  o Side rails x 3   · Compliance with Program/Exercises: Will assess as treatment progresses. · Recommendations/Intent for next treatment session: \"Next visit will focus on advancements to more challenging activities and reduction in assistance provided\".   Total Treatment Duration:  OT Patient Time In/Time Out  Time In: 1340  Time Out: 400 [facility]

## 2019-12-20 NOTE — PROGRESS NOTES
12/20/19 1848   NIH Stroke Scale   Interval   (dual NIH with varun cortez)   LOC 0   LOC Questions 0   LOC Commands 0   Best Gaze 1   Visual 0   Facial Palsy 2   Motor Right Arm 0   Motor Left Arm 4   Motor Right Leg 0   Motor Left Leg 4   Limb Ataxia 0   Sensory 0   Best Language 0   Dysarthria 1   Extinction and Inattention 0   Total 12

## 2019-12-20 NOTE — PROGRESS NOTES
E REHAB PROGRESS NOTE    Samantha Talavera  Admit Date: 12/12/2019  Admit Diagnosis: Acute ischemic stroke ([facility] [I63.9]; Cerebrovascular accident (CVA) due to embolism ([facility] [I63.9]    Subjective     Participating in therapies, able to sit supporting with R UE at EOB. Still with poor sitting balance, occasional correction needed. Tolerance improved Still without any voluntary movement on the left side.          Objective:     Current Facility-Administered Medications   Medication Dose Route Frequency    insulin lispro (HUMALOG) injection 4 Units  4 Units SubCUTAneous TIDAC    guaiFENesin-codeine (ROBITUSSIN AC) 100-10 mg/5 mL solution 10 mL  10 mL Oral Q4H PRN    metoprolol tartrate (LOPRESSOR) tablet 100 mg  100 mg Oral BID    fentaNYL citrate (PF) injection  mcg   mcg IntraVENous Multiple    midazolam (VERSED) injection 0.5-5 mg  0.5-5 mg IntraVENous Multiple    magnesium oxide (MAG-OX) tablet 400 mg  400 mg Oral BID    hydrALAZINE (APRESOLINE) 20 mg/mL injection 20 mg  20 mg IntraVENous Q4H PRN    atorvastatin (LIPITOR) tablet 80 mg  80 mg Oral QHS    amLODIPine (NORVASC) tablet 10 mg  10 mg Oral DAILY    cloNIDine HCl (CATAPRES) tablet 0.1 mg  0.1 mg Oral Q4H PRN    lisinopril (PRINIVIL, ZESTRIL) tablet 40 mg  40 mg Oral DAILY    sodium chloride (NS) flush 5-40 mL  5-40 mL IntraVENous Q8H    sodium chloride (NS) flush 5-40 mL  5-40 mL IntraVENous PRN    ondansetron (ZOFRAN) injection 4 mg  4 mg IntraVENous Q4H PRN    aspirin chewable tablet 81 mg  81 mg Oral DAILY    acetaminophen (TYLENOL) tablet 650 mg  650 mg Oral Q4H PRN    polyethylene glycol (MIRALAX) packet 17 g  17 g Oral DAILY PRN    enoxaparin (LOVENOX) injection 40 mg  40 mg SubCUTAneous Q24H    insulin lispro (HUMALOG) injection   SubCUTAneous AC&HS    dextrose 40% (GLUTOSE) oral gel 1 Tube  15 g Oral PRN    glucagon (GLUCAGEN) injection 1 mg  1 mg IntraMUSCular PRN    dextrose (D50W) injection syrg 12.5-25 g  25-50 mL IntraVENous PRN       Visit Vitals  /86 (BP 1 Location: Right arm, BP Patient Position: At rest)   Pulse (!) 56   Temp 98.3 °F (36.8 °C)   Resp 16   Ht 5' 6\" (1.676 m)   Wt 210 lb (95.3 kg)   SpO2 95%   BMI 33.89 kg/m²        Review of Systems: +antalgic gait. Denies chest pain, shortness of breath, cough, headache, visual problems, abdominal pain, dysurea, calf pain. Pertinent positives are as noted in the medical records and unremarkable otherwise. Physical Exam:   General: Alert and oriented. No acute cardio respiratory distress. HEENT: + L droop ,no scleral icterus  Oral mucosa moist without cyanosis, No bruit, No JVD. Lungs: Clear to auscultation   Heart: Regular rate and rhythm, S1, S2   No  murmurs    Abdomen: Soft, non-tender, nondistended. BS+   Genitourinary: defer   Neuromuscular:        PERRL, EOMI  + dysarthria. Follows simple commands consistently. Able to identify, recall repeat. LUE     Shoulder abduction  0 /5              Elbow flexion:  0 /5               Wrist extension:  0 /5              Finger flexion;   0/5  RUE    Shoulder abduction: 5 /5                Elbow flexion:   5/5               Wrist extension:5/5              Finger flexion:  5/5  LLE     Hip flexion:  0 /5              Knee extension:  0 /5               Ankle dorsiflexion: 0  /5              Ankle plantarflexion:  0 /5                                 RLE     Hip flexion:  5 /5              Knee extension:  5 /5               Ankle dorsiflexion:  5 /5              Ankle plantarflexion:   5/5  Sensory - intact to touch,proprioception. Skin/extremity: No rashes, no erythema. Calf non tender BLE.                                                                                                                                 Functional Assessment:  Gross Assessment  AROM: (R UE WFL; L UE non-functional AROM) (12/15/19 1200)  PROM: Generally decreased, functional(L UE) (12/15/19 1200)  Strength: (R UE WFL; L UE non-functional; flaccid) (12/15/19 1200)  Coordination: (non-functional in the L UE) (12/15/19 1200)  Tone: Abnormal(hypotonic in L UE) (12/15/19 1200)  Sensation: Intact(reports intact grossly to light touch) (12/15/19 1200)   Bed Mobility  Rolling: Maximum assistance (12/20/19 1400)  Supine to Sit: Maximum assistance (12/20/19 1400)  Sit to Supine: Maximum assistance;Assist x2 (12/16/19 1100)  Scooting: Maximum assistance (12/18/19 1400)   Balance  Sitting: Impaired (12/15/19 1200)  Sitting - Static: Fair (occasional); Poor (constant support) (12/20/19 1400)  Sitting - Dynamic: Fair (occasional); Poor (constant support) (12/20/19 1400)  Standing: Impaired (12/20/19 1400)  Standing - Static: Poor (12/20/19 1400)  Standing - Dynamic : Poor (12/20/19 1400)               Bed/Mat Mobility  Rolling: Maximum assistance (12/20/19 1400)  Supine to Sit: Maximum assistance (12/20/19 1400)  Sit to Supine: Maximum assistance;Assist x2 (12/16/19 1100)  Sit to Stand: Maximum assistance; Additional time (12/20/19 1400)  Stand to Sit: Maximum assistance; Additional time (12/20/19 1400)  Bed to Chair: Moderate assistance;Assist x2 (12/13/19 0852)  Scooting: Maximum assistance (12/18/19 1400)     Rebbecca Persons Fall Risk Assessment:  Rebbecca Persons Fall Risk  Mobility: Unable to ambulate or transfer (12/20/19 0800)  Mobility Interventions: Bed/chair exit alarm;OT consult for ADLs; Patient to call before getting OOB;PT Consult for mobility concerns (12/20/19 0800)  Mentation: Alert, oriented x 3 (12/20/19 0800)  Mentation Interventions: Bed/chair exit alarm; Door open when patient unattended (12/20/19 0800)  Medication: Patient receiving anticonvulsants, sedatives(tranquilizers), psychotropics or hypnotics, hypoglycemics, narcotics, sleep aids, antihypertensives, laxatives, or diuretics (12/20/19 0800)  Medication Interventions: Bed/chair exit alarm; Patient to call before getting OOB; Teach patient to arise slowly (12/20/19 0800)  Elimination: Incontinence (12/20/19 0800)  Elimination Interventions: Bed/chair exit alarm;Call light in reach; Patient to call for help with toileting needs; Toileting schedule/hourly rounds (12/20/19 0800)  Prior Fall History: Before admission in past 12 months _home or previous inpatient care) (12/20/19 0800)  History of Falls Interventions: Bed/chair exit alarm; Door open when patient unattended;Consult care management for discharge planning (12/20/19 0800)  Total Score: 3 (12/20/19 0800)  Standard Fall Precautions: Yes (12/12/19 4183)  High Fall Risk: Yes (12/20/19 0800)     Speech Assessment:         Ambulation:  Gait  Base of Support: Center of gravity altered;Narrowed; Shift to right (12/13/19 9014)  Speed/Clotilde: Delayed; Shuffled; Slow (12/13/19 3874)  Step Length: Left shortened;Right shortened (12/13/19 2355)  Gait Abnormalities: Decreased step clearance; Hemiplegic;Trunk sway increased (12/13/19 0850)  Ambulation - Level of Assistance:  Moderate assistance;Assist x2 (12/13/19 0852)  Distance (ft): 5 Feet (ft)(side steps edge of bed) (12/13/19 0852)  Assistive Device: [place] Flicker, rolling;Gait belt (12/13/19 0852)     Labs/Studies:  Recent Results (from the past 72 hour(s))   GLUCOSE, POC    Collection Time: 12/17/19  4:22 PM   Result Value Ref Range    Glucose (POC) 63 (L) 65 - 100 mg/dL   GLUCOSE, POC    Collection Time: 12/17/19  6:20 PM   Result Value Ref Range    Glucose (POC) 97 65 - 100 mg/dL   GLUCOSE, POC    Collection Time: 12/17/19  8:35 PM   Result Value Ref Range    Glucose (POC) 108 (H) 65 - 100 mg/dL   GLUCOSE, POC    Collection Time: 12/18/19  7:09 AM   Result Value Ref Range    Glucose (POC) 131 (H) 65 - 100 mg/dL   GLUCOSE, POC    Collection Time: 12/18/19 11:10 AM   Result Value Ref Range    Glucose (POC) 118 (H) 65 - 100 mg/dL   GLUCOSE, POC    Collection Time: 12/18/19  4:36 PM   Result Value Ref Range    Glucose (POC) 114 (H) 65 - 100 mg/dL   GLUCOSE, POC    Collection Time: 12/18/19  5:00 PM   Result Value Ref Range    Glucose (POC) 105 (H) 65 - 100 mg/dL   GLUCOSE, POC    Collection Time: 12/18/19  7:13 PM   Result Value Ref Range    Glucose (POC) 105 (H) 65 - 100 mg/dL   GLUCOSE, POC    Collection Time: 12/18/19  8:41 PM   Result Value Ref Range    Glucose (POC) 86 65 - 100 mg/dL   GLUCOSE, POC    Collection Time: 12/19/19  7:15 AM   Result Value Ref Range    Glucose (POC) 147 (H) 65 - 100 mg/dL   GLUCOSE, POC    Collection Time: 12/19/19 11:32 AM   Result Value Ref Range    Glucose (POC) 210 (H) 65 - 100 mg/dL   GLUCOSE, POC    Collection Time: 12/19/19  4:15 PM   Result Value Ref Range    Glucose (POC) 62 (L) 65 - 100 mg/dL   GLUCOSE, POC    Collection Time: 12/19/19  4:42 PM   Result Value Ref Range    Glucose (POC) 86 65 - 100 mg/dL   GLUCOSE, POC    Collection Time: 12/19/19  7:35 PM   Result Value Ref Range    Glucose (POC) 149 (H) 65 - 100 mg/dL   GLUCOSE, POC    Collection Time: 12/20/19  7:04 AM   Result Value Ref Range    Glucose (POC) 122 (H) 65 - 100 mg/dL   GLUCOSE, POC    Collection Time: 12/20/19 10:50 AM   Result Value Ref Range    Glucose (POC) 140 (H) 65 - 100 mg/dL       Assessment:     Problem List as of 12/20/2019 Date Reviewed: 3/3/2017          Codes Class Noted - Resolved    PFO (patent foramen ovale) ICD-10-CM: Q21.1  ICD-9-CM: 745.5  12/17/2019 - Present        Arrhythmia ICD-10-CM: I49.9  ICD-9-CM: 427.9  12/15/2019 - Present        Hyperlipemia ICD-10-CM: E78.5  ICD-9-CM: 272.4  12/15/2019 - Present        * (Principal) Acute ischemic stroke ([facility] ICD-10-CM: I63.9  ICD-9-CM: 434.91  12/12/2019 - Present        Microcytic anemia ICD-10-CM: D50.9  ICD-9-CM: 280.9  11/20/2014 - Present        Acute pancreatitis ICD-10-CM: K85.90  ICD-9-CM: 577.0  11/19/2014 - Present        GERD (gastroesophageal reflux disease) (Chronic) ICD-10-CM: K21.9  ICD-9-CM: 530.81  11/19/2014 - Present        Hypertension (Chronic) ICD-10-CM: I10  ICD-9-CM: 401.9  Unknown - Present        Diabetes (HCC) (Chronic) ICD-10-CM: E11.9  ICD-9-CM: 250.00  Unknown - Present              Plan:     Acute CVA -  left  hemiplegia, dysarthria, cognitive/ linguistic deficits. - aspirin, lipitor, risk factor management. - cardiology following for PFO/ R-L shunt      - Continue acute PT, OT to focus on left sided neuro deficits on acute floor. Continue balance activities, transfer training.    - severe functional debility; expect prolonged rehab course.   - ST to continue to evaluate/treat for dysphagia. - Patient will benefit from contiued dedicated stroke rehab. However anticipate prolonged rehab course. Unlikely patient will be able to return home anytime soon. Patient will require a viable discharge plan; placement options. CM following.        Signed By: Mei Irizarry MD     December 20, 2019

## 2019-12-20 NOTE — PROGRESS NOTES
Hospitalist Note     Admit Date:  2019  9:07 AM   Name:  Dio Pavon   Age:  53 y.o.  :  1975   MRN:  [id number]   PCP:  Ligia Acuña MD      HPI/Subjective:      54 y/o smoker with DM, HTN, who presented to ER  with L leg and arm weakness, L facial droop, dysarthria. First noted L leg weakness late night  when he woke up to go to the bathroom. He was normal when he went to bed around 1030. Woke up and had persistent weakness L leg and also now noted in L arm. EMS called. Noted to have slurred speech and L facial droop as well. Code S called in ER around 9am.  MRI with acute infarcts in L cerebellar hemisphere, deep frontoparietal white matter, and R paramedian yariel. Also noted old lacunar infarcts. No large vessel occlusion on CTA, but some stenosis noted. No hx afib, TIA, CVA. No CP, palpitations, SOB. Today, no ac events, tolerated po, no new neuro spx or improvement, no changes, improved dry cough, no SOB. Feels well. Awaiting DC direction    Objective:     Patient Vitals for the past 24 hrs:   Temp Pulse Resp BP SpO2   19 0800 98.3 °F (36.8 °C) 64 18 157/89 91 %   19 0400 98.4 °F (36.9 °C) 63 18 138/84 96 %   19 0000 97.9 °F (36.6 °C) 60 18 129/86 90 %   19 2000 97.9 °F (36.6 °C) 60 18 132/70 90 %   19 1600 97.4 °F (36.3 °C) (!) 57 18 109/65 94 %   19 1200 97.7 °F (36.5 °C) (!) 59 18 105/74 91 %     Oxygen Therapy  O2 Sat (%): 91 % (19 0800)  Pulse via Oximetry: 73 beats per minute (19 1615)  No intake or output data in the 24 hours ending 19 0925    Physical Exam:  General:    Well nourished. Alert and oriented. CV:   RRR. No murmur, rub, or gallop. Lungs:  Clear to auscultation bilaterally. No wheezing, rhonchi, or rales  Extremities: Warm and dry. No cyanosis or edema. Neurologic: CN II-XII intact except for mild L facial droop. Sensation intact. Finger to nose intact. PERRLA. Mild slurred speech. No confusion. STR 1/5 LUE and LLE. Skin:     No rashes or jaundice. No wounds. Psych:  Normal mood and affect. I reviewed the labs, imaging, EKGs, telemetry, and other studies done this admission.   Data Review:   Recent Results (from the past 24 hour(s))   GLUCOSE, POC    Collection Time: 12/19/19 11:32 AM   Result Value Ref Range    Glucose (POC) 210 (H) 65 - 100 mg/dL   GLUCOSE, POC    Collection Time: 12/19/19  4:15 PM   Result Value Ref Range    Glucose (POC) 62 (L) 65 - 100 mg/dL   GLUCOSE, POC    Collection Time: 12/19/19  4:42 PM   Result Value Ref Range    Glucose (POC) 86 65 - 100 mg/dL   GLUCOSE, POC    Collection Time: 12/19/19  7:35 PM   Result Value Ref Range    Glucose (POC) 149 (H) 65 - 100 mg/dL   GLUCOSE, POC    Collection Time: 12/20/19  7:04 AM   Result Value Ref Range    Glucose (POC) 122 (H) 65 - 100 mg/dL       All Micro Results     None          Current Facility-Administered Medications   Medication Dose Route Frequency    insulin lispro (HUMALOG) injection 4 Units  4 Units SubCUTAneous TIDAC    guaiFENesin-codeine (ROBITUSSIN AC) 100-10 mg/5 mL solution 10 mL  10 mL Oral Q4H PRN    metoprolol tartrate (LOPRESSOR) tablet 100 mg  100 mg Oral BID    fentaNYL citrate (PF) injection  mcg   mcg IntraVENous Multiple    midazolam (VERSED) injection 0.5-5 mg  0.5-5 mg IntraVENous Multiple    magnesium oxide (MAG-OX) tablet 400 mg  400 mg Oral BID    hydrALAZINE (APRESOLINE) 20 mg/mL injection 20 mg  20 mg IntraVENous Q4H PRN    atorvastatin (LIPITOR) tablet 80 mg  80 mg Oral QHS    amLODIPine (NORVASC) tablet 10 mg  10 mg Oral DAILY    cloNIDine HCl (CATAPRES) tablet 0.1 mg  0.1 mg Oral Q4H PRN    lisinopril (PRINIVIL, ZESTRIL) tablet 40 mg  40 mg Oral DAILY    sodium chloride (NS) flush 5-40 mL  5-40 mL IntraVENous Q8H    sodium chloride (NS) flush 5-40 mL  5-40 mL IntraVENous PRN    ondansetron (ZOFRAN) injection 4 mg  4 mg IntraVENous Q4H PRN    aspirin chewable tablet 81 mg  81 mg Oral DAILY    acetaminophen (TYLENOL) tablet 650 mg  650 mg Oral Q4H PRN    polyethylene glycol (MIRALAX) packet 17 g  17 g Oral DAILY PRN    enoxaparin (LOVENOX) injection 40 mg  40 mg SubCUTAneous Q24H    insulin lispro (HUMALOG) injection   SubCUTAneous AC&HS    dextrose 40% (GLUTOSE) oral gel 1 Tube  15 g Oral PRN    glucagon (GLUCAGEN) injection 1 mg  1 mg IntraMUSCular PRN    dextrose (D50W) injection syrg 12.5-25 g  25-50 mL IntraVENous PRN       Other Studies:  No results found. Results for orders placed or performed during the hospital encounter of 19   2D ECHO COMPLETE ADULT (TTE) [address]  [address] [facility], [address] [facility]  [phone]    Transthoracic Echocardiogram  2D, M-mode, Doppler, and Color Doppler    Patient: Renae Alba  MR #: [id number]  : 1975  Age: 53 years  Gender: Male  Study date: 12-Dec-2019  Account #: [de-identified]  Height: 66 in  Weight: 209.7 lb  BSA: 2.04 mï¾²  Status:Routine  Location: ER03  BP: 133/ 76    Allergies: NO KNOWN ALLERGENS    Sonographer:  LUZ Riley  Group:  [facility]  Referring Physician:  Nicolas Dennis. Stacy Price MD  Reading Physician:  Denia White [address] MD [facility] [place]    INDICATIONS: CVA    PROCEDURE: This was a routine study. A transthoracic echocardiogram was  performed. The study included complete 2D imaging, M-mode, complete spectral  Doppler, and color Doppler. Intravenous contrast (Definity) was administered. Image quality was adequate. LEFT VENTRICLE: Size was normal. Systolic function was normal. Ejection  fraction was estimated in the range of 65 % to 70 %. There were no regional  wall motion abnormalities. There was mild concentric hypertrophy.  Left  ventricular diastolic function parameters were normal. Avg E/e': 11.05.    RIGHT VENTRICLE: The size was normal. Systolic function was normal. The  tricuspid jet envelope definition was inadequate for estimation of RV   systolic  pressure. LEFT ATRIUM: Size was normal.    ATRIAL SEPTUM: Agitated saline contrast injection (bubble study) was   performed. There was a right-to-left shunt, in the baseline state. RIGHT ATRIUM: Size was normal.    SYSTEMIC VEINS: IVC: The inferior vena cava was normal in size and course. The  respirophasic change in diameter was more than 50%. AORTIC VALVE: The valve was trileaflet. Leaflets exhibited mild sclerosis. There was no evidence for stenosis. There was no insufficiency. MITRAL VALVE: Valve structure was normal. There was no evidence for stenosis. There was no regurgitation. TRICUSPID VALVE: The valve structure was normal. There was no evidence for  stenosis. There was trivial regurgitation. PULMONIC VALVE: Not well visualized. There was no evidence for stenosis. There  was no insufficiency. PERICARDIUM: There was no pericardial effusion. AORTA: The root exhibited normal size. SUMMARY:    -  Left ventricle: Systolic function was normal. Ejection fraction was  estimated in the range of 65 % to 70 %. There were no regional wall motion  abnormalities. There was mild concentric hypertrophy. -  Atrial septum: Agitated saline contrast injection (bubble study) was  performed. There was a right-to-left shunt, in the baseline state. -  Inferior vena cava, hepatic veins: The respirophasic change in diameter   was  more than 50%. SYSTEM MEASUREMENT TABLES    2D mode  AoR Diam (2D): 2.8 cm  LA Dimension (2D): 3.5 cm  Left Atrium Systolic Volume Index; Method of Disks, Biplane; 2D mode;: 26   ml/m2  IVS/LVPW (2D): 1  IVSd (2D): 1.2 cm  LVIDd (2D): 5.2 cm  LVIDs (2D): 2.9 cm  LVOT Area (2D): 2.3 cm2  LVPWd (2D): 1.2 cm  RVIDd (2D): 2.2 cm    Tissue Doppler Imaging  LV Peak Early Thomas Tissue Rc; Lateral MA (TDI): 8.1 cm/s  LV Peak Early Thomas Tissue Rc; Medial MA (TDI): 7.6 cm/s    Unspecified Scan Mode  Peak Grad; Mean;  Antegrade Flow: 14 mm[Hg]  Vmax; Antegrade Flow: 185 cm/s  LVOT Diam: 1.7 cm  MV Peak Rc/LV Peak Tissue Rc E-Wave; Lateral MA: 10.7  MV Peak Rc/LV Peak Tissue Rc E-Wave; Medial MA: 11.4    Prepared and signed by    George Hackett. Mary Dorman MD [facility] [place]  Signed 12-Dec-2019 15:46:58             Assessment and Plan:     Dx:  1- Acute embolic stroke, peristent weakness on left side.  Pt has PFO w/ large shunt  2- Hypertension, controlled  3- Hypokalemia and hypomagnesemia, replaced    Rx:  ASA  Statin and Omega 3  Neuro checks  Follow cardiology input  Neuro follow  Plan closure of PFO/ shunt 4 wks after DC    Dispo; need rehab, no insurance, d/w     Signed:  Osvaldo Boogie MD

## 2019-12-20 NOTE — PROGRESS NOTES
SPEECH PATHOLOGY NOTE:    Patient working with another therapist at time of attempt. Will check back at later time/date as schedule permits. Addendum: second attempt, patient drowsy and wanting to rest. Will re-attempt at later date.        Layla [address] CCC-SLP

## 2019-12-20 NOTE — PROGRESS NOTES
12/20/19 0658   NIH Stroke Scale   Interval Other (comment)  (dual assessment with Ethel EDWARDS)   LOC 0   LOC Questions 0   LOC Commands 0   Best Gaze 1   Visual 0   Facial Palsy 2   Motor Right Arm 0   Motor Left Arm 4   Motor Right Leg 0   Motor Left Leg 4   Limb Ataxia 0   Sensory 0   Best Language 0   Dysarthria 1   Extinction and Inattention 0   Total 12

## 2019-12-20 NOTE — PROGRESS NOTES
JUIN met with pt, spouse and a family friend. Discussed pt's discharge needs and barriers to meeting these needs. Pt remains uninsured so a rehab placement outside the hospital is highly unlikely. DIXIE has initiated a medicaid application for the pt but this could take months to be approved. Pt's spouse is very concerned about the pt dc'ing to home. 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 She is also concerned because they live in a 2nd floor apartment. She has not spoken to her landlord to request a 1st floor apartment. JUNI strongly encouraged her to speak with the landlord to find out whether or not a 1st floor apartment is even an option. Pt/spouse's housing choices are limited due to financial issues. Both she and the family friend, who is also a member of their Islam, were adamant that there is no one (family, friends, Islam members) that could take them in or help them. At this point, pt's options are very limited. It does not seem safe for him, in his current condition, to dc to his home with his spouse as his caregiver. SNF rehab is not an option due to lack of funding. [facility] is pt's only viable option at present. [facility] MD continues to follow pt's progress. JUNI will continue to follow.

## 2019-12-21 LAB
GLUCOSE BLD STRIP.AUTO-MCNC: 117 MG/DL (ref 65–100)
GLUCOSE BLD STRIP.AUTO-MCNC: 147 MG/DL (ref 65–100)
GLUCOSE BLD STRIP.AUTO-MCNC: 199 MG/DL (ref 65–100)
GLUCOSE BLD STRIP.AUTO-MCNC: 76 MG/DL (ref 65–100)

## 2019-12-21 PROCEDURE — 74011250637 HC RX REV CODE- 250/637: Performed by: INTERNAL MEDICINE

## 2019-12-21 PROCEDURE — 74011636637 HC RX REV CODE- 636/637: Performed by: HOSPITALIST

## 2019-12-21 PROCEDURE — 82962 GLUCOSE BLOOD TEST: CPT

## 2019-12-21 PROCEDURE — 65660000000 HC RM CCU STEPDOWN

## 2019-12-21 PROCEDURE — 74011250637 HC RX REV CODE- 250/637: Performed by: HOSPITALIST

## 2019-12-21 PROCEDURE — 74011636637 HC RX REV CODE- 636/637: Performed by: INTERNAL MEDICINE

## 2019-12-21 PROCEDURE — 65270000029 HC RM PRIVATE

## 2019-12-21 PROCEDURE — 74011250636 HC RX REV CODE- 250/636: Performed by: INTERNAL MEDICINE

## 2019-12-21 RX ORDER — GUAIFENESIN 100 MG/5ML
100 SOLUTION ORAL
Status: DISCONTINUED | OUTPATIENT
Start: 2019-12-21 | End: 2020-06-10 | Stop reason: HOSPADM

## 2019-12-21 RX ADMIN — GUAIFENESIN 100 MG: 100 SOLUTION ORAL at 21:32

## 2019-12-21 RX ADMIN — ATORVASTATIN CALCIUM 80 MG: 80 TABLET, FILM COATED ORAL at 21:32

## 2019-12-21 RX ADMIN — Medication 10 ML: at 21:35

## 2019-12-21 RX ADMIN — INSULIN LISPRO 4 UNITS: 100 INJECTION, SOLUTION INTRAVENOUS; SUBCUTANEOUS at 12:13

## 2019-12-21 RX ADMIN — METOPROLOL TARTRATE 100 MG: 50 TABLET, FILM COATED ORAL at 17:49

## 2019-12-21 RX ADMIN — ENOXAPARIN SODIUM 40 MG: 40 INJECTION SUBCUTANEOUS at 13:18

## 2019-12-21 RX ADMIN — AMLODIPINE BESYLATE 10 MG: 10 TABLET ORAL at 08:42

## 2019-12-21 RX ADMIN — LISINOPRIL 40 MG: 20 TABLET ORAL at 08:42

## 2019-12-21 RX ADMIN — Medication 10 ML: at 05:30

## 2019-12-21 RX ADMIN — INSULIN LISPRO 2 UNITS: 100 INJECTION, SOLUTION INTRAVENOUS; SUBCUTANEOUS at 12:13

## 2019-12-21 RX ADMIN — ASPIRIN 81 MG 81 MG: 81 TABLET ORAL at 08:42

## 2019-12-21 RX ADMIN — Medication 10 ML: at 13:23

## 2019-12-21 RX ADMIN — INSULIN LISPRO 4 UNITS: 100 INJECTION, SOLUTION INTRAVENOUS; SUBCUTANEOUS at 08:30

## 2019-12-21 RX ADMIN — METOPROLOL TARTRATE 100 MG: 50 TABLET, FILM COATED ORAL at 08:42

## 2019-12-21 RX ADMIN — GUAIFENESIN 100 MG: 100 SOLUTION ORAL at 13:17

## 2019-12-21 NOTE — PROGRESS NOTES
Problem: Patient Education: Go to Patient Education Activity  Goal: Patient/Family Education  Outcome: Progressing Towards Goal     Problem: Pressure Injury - Risk of  Goal: *Prevention of pressure injury  Description  Document Dewey Scale and appropriate interventions in the flowsheet.   Outcome: Progressing Towards Goal  Note: Pressure Injury Interventions:  Sensory Interventions: Assess changes in LOC    Moisture Interventions: Absorbent underpads, Apply protective barrier, creams and emollients, Moisture barrier, Offer toileting Q_hr    Activity Interventions: Assess need for specialty bed    Mobility Interventions: Assess need for specialty bed, PT/OT evaluation, Pressure redistribution bed/mattress (bed type), HOB 30 degrees or less    Nutrition Interventions: Offer support with meals,snacks and hydration, Document food/fluid/supplement intake    Friction and Shear Interventions: HOB 30 degrees or less, Feet elevated on foot rest, Apply protective barrier, creams and emollients

## 2019-12-21 NOTE — PROGRESS NOTES
12/21/19 0740   NIH Stroke Scale   Interval Other (comment)  (dual NIH with Mazin Garcia RN)   LOC 0   LOC Questions 0   LOC Commands 0   Best Gaze 1   Visual 0   Facial Palsy 2   Motor Right Arm 0   Motor Left Arm 4   Motor Right Leg 0   Motor Left Leg 4   Limb Ataxia 0   Sensory 0   Best Language 2   Dysarthria 1   Extinction and Inattention 0   Total 14

## 2019-12-21 NOTE — PROGRESS NOTES
12/21/19 1174   NIH Stroke Scale   Interval Other (comment)  (with JERRY Espinosa)   LOC 0   LOC Questions 0   LOC Commands 0   Best Gaze 1   Visual 0   Facial Palsy 2   Motor Right Arm 0   Motor Left Arm 4   Motor Right Leg 0   Motor Left Leg 4   Limb Ataxia 0   Sensory 0   Best Language 2   Dysarthria 1   Extinction and Inattention 0   Total 14

## 2019-12-21 NOTE — PROGRESS NOTES
Hospitalist Note     Admit Date:  2019  9:07 AM   Name:  Kervin Milian   Age:  53 y.o.  :  1975   MRN:  [id number]   PCP:  Jeremy Taylor MD      HPI/Subjective:      54 y/o smoker with DM, HTN, who presented to ER  with L leg and arm weakness, L facial droop, dysarthria. First noted L leg weakness late night  when he woke up to go to the bathroom. He was normal when he went to bed around 1030. Woke up and had persistent weakness L leg and also now noted in L arm. EMS called. Noted to have slurred speech and L facial droop as well. Code S called in ER around 9am.  MRI with acute infarcts in L cerebellar hemisphere, deep frontoparietal white matter, and R paramedian yariel. Also noted old lacunar infarcts. No large vessel occlusion on CTA, but some stenosis noted. No hx afib, TIA, CVA. No CP, palpitations, SOB. Today, no ac events, tolerated po, no new neuro spx or improvement, Awaiting DC direction. Has some productive cough and looks drowzy    Objective:     Patient Vitals for the past 24 hrs:   Temp Pulse Resp BP SpO2   19 0800 98.6 °F (37 °C) 61 17 150/90 92 %   19 0400 98.9 °F (37.2 °C) 63 17 151/80 92 %   19 0000 98.4 °F (36.9 °C) 60 17 152/89 96 %   19 2000 98.4 °F (36.9 °C) (!) 59 17 (!) 149/93 98 %   19 1900  (!) 56      19 1559 97.9 °F (36.6 °C) 61 17 134/88 92 %   19 1200 98.3 °F (36.8 °C) (!) 56 16 142/86 95 %     Oxygen Therapy  O2 Sat (%): 92 % (19 0800)  Pulse via Oximetry: 73 beats per minute (19 1615)  No intake or output data in the 24 hours ending 19 1108    Physical Exam:  General:    Well nourished. Alert and oriented. CV:   RRR. No murmur, rub, or gallop. Lungs:  Clear to auscultation bilaterally. No wheezing, rhonchi, or rales  Extremities: Warm and dry. No cyanosis or edema. Neurologic: CN II-XII intact except for mild L facial droop. Sensation intact. Finger to nose intact. PERRLA. Mild slurred speech. No confusion. STR 1/5 LUE and LLE. Skin:     No rashes or jaundice. No wounds. Psych:  Normal mood and affect. I reviewed the labs, imaging, EKGs, telemetry, and other studies done this admission.   Data Review:   Recent Results (from the past 24 hour(s))   GLUCOSE, POC    Collection Time: 12/20/19  4:30 PM   Result Value Ref Range    Glucose (POC) 145 (H) 65 - 100 mg/dL   GLUCOSE, POC    Collection Time: 12/20/19  8:51 PM   Result Value Ref Range    Glucose (POC) 127 (H) 65 - 100 mg/dL   GLUCOSE, POC    Collection Time: 12/21/19  8:02 AM   Result Value Ref Range    Glucose (POC) 147 (H) 65 - 100 mg/dL       All Micro Results     None          Current Facility-Administered Medications   Medication Dose Route Frequency    guaiFENesin (ROBITUSSIN) 100 mg/5 mL oral liquid 100 mg  100 mg Oral Q4H PRN    insulin lispro (HUMALOG) injection 4 Units  4 Units SubCUTAneous TIDAC    metoprolol tartrate (LOPRESSOR) tablet 100 mg  100 mg Oral BID    hydrALAZINE (APRESOLINE) 20 mg/mL injection 20 mg  20 mg IntraVENous Q4H PRN    atorvastatin (LIPITOR) tablet 80 mg  80 mg Oral QHS    amLODIPine (NORVASC) tablet 10 mg  10 mg Oral DAILY    cloNIDine HCl (CATAPRES) tablet 0.1 mg  0.1 mg Oral Q4H PRN    lisinopril (PRINIVIL, ZESTRIL) tablet 40 mg  40 mg Oral DAILY    sodium chloride (NS) flush 5-40 mL  5-40 mL IntraVENous Q8H    sodium chloride (NS) flush 5-40 mL  5-40 mL IntraVENous PRN    ondansetron (ZOFRAN) injection 4 mg  4 mg IntraVENous Q4H PRN    aspirin chewable tablet 81 mg  81 mg Oral DAILY    acetaminophen (TYLENOL) tablet 650 mg  650 mg Oral Q4H PRN    polyethylene glycol (MIRALAX) packet 17 g  17 g Oral DAILY PRN    enoxaparin (LOVENOX) injection 40 mg  40 mg SubCUTAneous Q24H    insulin lispro (HUMALOG) injection   SubCUTAneous AC&HS    dextrose 40% (GLUTOSE) oral gel 1 Tube  15 g Oral PRN    glucagon (GLUCAGEN) injection 1 mg  1 mg IntraMUSCular PRN    dextrose (D50W) injection syrg 12.5-25 g  25-50 mL IntraVENous PRN       Other Studies:  No results found. Results for orders placed or performed during the hospital encounter of 19   2D ECHO COMPLETE ADULT (TTE) [address]  [address] [place], [address] [facility]  [phone]    Transthoracic Echocardiogram  2D, M-mode, Doppler, and Color Doppler    Patient: Sam Thomas  MR #: [id number]  : 1975  Age: 53 years  Gender: Male  Study date: 12-Dec-2019  Account #: [de-identified]  Height: 66 in  Weight: 209.7 lb  BSA: 2.04 mï¾²  Status:Routine  Location: ER03  BP: 133/ 76    Allergies: NO KNOWN ALLERGENS    Sonographer:  LUZ So  Group:  [facility]  Referring Physician:  Marli Kay. Krista Salgado MD  Reading Physician:  Cherie Woodward [address], MD [facility] [place]    INDICATIONS: CVA    PROCEDURE: This was a routine study. A transthoracic echocardiogram was  performed. The study included complete 2D imaging, M-mode, complete spectral  Doppler, and color Doppler. Intravenous contrast (Definity) was administered. Image quality was adequate. LEFT VENTRICLE: Size was normal. Systolic function was normal. Ejection  fraction was estimated in the range of 65 % to 70 %. There were no regional  wall motion abnormalities. There was mild concentric hypertrophy. Left  ventricular diastolic function parameters were normal. Avg E/e': 11.05.    RIGHT VENTRICLE: The size was normal. Systolic function was normal. The  tricuspid jet envelope definition was inadequate for estimation of RV   systolic  pressure. LEFT ATRIUM: Size was normal.    ATRIAL SEPTUM: Agitated saline contrast injection (bubble study) was   performed. There was a right-to-left shunt, in the baseline state. RIGHT ATRIUM: Size was normal.    SYSTEMIC VEINS: IVC: The inferior vena cava was normal in size and course. The  respirophasic change in diameter was more than 50%.     AORTIC VALVE: The valve was trileaflet. Leaflets exhibited mild sclerosis. There was no evidence for stenosis. There was no insufficiency. MITRAL VALVE: Valve structure was normal. There was no evidence for stenosis. There was no regurgitation. TRICUSPID VALVE: The valve structure was normal. There was no evidence for  stenosis. There was trivial regurgitation. PULMONIC VALVE: Not well visualized. There was no evidence for stenosis. There  was no insufficiency. PERICARDIUM: There was no pericardial effusion. AORTA: The root exhibited normal size. SUMMARY:    -  Left ventricle: Systolic function was normal. Ejection fraction was  estimated in the range of 65 % to 70 %. There were no regional wall motion  abnormalities. There was mild concentric hypertrophy. -  Atrial septum: Agitated saline contrast injection (bubble study) was  performed. There was a right-to-left shunt, in the baseline state. -  Inferior vena cava, hepatic veins: The respirophasic change in diameter   was  more than 50%. SYSTEM MEASUREMENT TABLES    2D mode  AoR Diam (2D): 2.8 cm  LA Dimension (2D): 3.5 cm  Left Atrium Systolic Volume Index; Method of Disks, Biplane; 2D mode;: 26   ml/m2  IVS/LVPW (2D): 1  IVSd (2D): 1.2 cm  LVIDd (2D): 5.2 cm  LVIDs (2D): 2.9 cm  LVOT Area (2D): 2.3 cm2  LVPWd (2D): 1.2 cm  RVIDd (2D): 2.2 cm    Tissue Doppler Imaging  LV Peak Early Thomas Tissue Rc; Lateral MA (TDI): 8.1 cm/s  LV Peak Early Thomsa Tissue Rc; Medial MA (TDI): 7.6 cm/s    Unspecified Scan Mode  Peak Grad; Mean; Antegrade Flow: 14 mm[Hg]  Vmax; Antegrade Flow: 185 cm/s  LVOT Diam: 1.7 cm  MV Peak Rc/LV Peak Tissue Rc E-Wave; Lateral MA: 10.7  MV Peak Rc/LV Peak Tissue Rc E-Wave; Medial MA: 11.4    Prepared and signed by    Jl Fuentes. Lillian Cuellar MD [facility] [place]  Signed 12-Dec-2019 15:46:58             Assessment and Plan:     Dx:  1- Acute embolic stroke, peristent weakness on left side.  Pt has PFO w/ large shunt  2- Hypertension, controlled  3- Hypokalemia and hypomagnesemia, replaced  4- Cough    Rx:  ASA  Statin and Omega 3  Add antitussive and inc spirometry  Neuro checks  Follow cardiology input  Neuro follow  Plan closure of PFO/ shunt 4 wks after DC    Dispo; need rehab, no insurance, d/w CM    Signed:  Bonny Breen MD

## 2019-12-22 LAB
GLUCOSE BLD STRIP.AUTO-MCNC: 100 MG/DL (ref 65–100)
GLUCOSE BLD STRIP.AUTO-MCNC: 125 MG/DL (ref 65–100)
GLUCOSE BLD STRIP.AUTO-MCNC: 133 MG/DL (ref 65–100)
GLUCOSE BLD STRIP.AUTO-MCNC: 145 MG/DL (ref 65–100)

## 2019-12-22 PROCEDURE — 74011250637 HC RX REV CODE- 250/637: Performed by: HOSPITALIST

## 2019-12-22 PROCEDURE — 74011250637 HC RX REV CODE- 250/637: Performed by: INTERNAL MEDICINE

## 2019-12-22 PROCEDURE — 74011636637 HC RX REV CODE- 636/637: Performed by: HOSPITALIST

## 2019-12-22 PROCEDURE — 65270000029 HC RM PRIVATE

## 2019-12-22 PROCEDURE — 74011250636 HC RX REV CODE- 250/636: Performed by: INTERNAL MEDICINE

## 2019-12-22 PROCEDURE — 77030019605

## 2019-12-22 PROCEDURE — 65660000000 HC RM CCU STEPDOWN

## 2019-12-22 PROCEDURE — 82962 GLUCOSE BLOOD TEST: CPT

## 2019-12-22 RX ADMIN — METOPROLOL TARTRATE 100 MG: 50 TABLET, FILM COATED ORAL at 17:36

## 2019-12-22 RX ADMIN — Medication 5 ML: at 21:55

## 2019-12-22 RX ADMIN — Medication 10 ML: at 06:05

## 2019-12-22 RX ADMIN — INSULIN LISPRO 4 UNITS: 100 INJECTION, SOLUTION INTRAVENOUS; SUBCUTANEOUS at 12:11

## 2019-12-22 RX ADMIN — INSULIN LISPRO 4 UNITS: 100 INJECTION, SOLUTION INTRAVENOUS; SUBCUTANEOUS at 17:37

## 2019-12-22 RX ADMIN — ATORVASTATIN CALCIUM 80 MG: 80 TABLET, FILM COATED ORAL at 21:54

## 2019-12-22 RX ADMIN — GUAIFENESIN 100 MG: 100 SOLUTION ORAL at 13:12

## 2019-12-22 RX ADMIN — ENOXAPARIN SODIUM 40 MG: 40 INJECTION SUBCUTANEOUS at 13:12

## 2019-12-22 RX ADMIN — METOPROLOL TARTRATE 100 MG: 50 TABLET, FILM COATED ORAL at 09:37

## 2019-12-22 RX ADMIN — ASPIRIN 81 MG 81 MG: 81 TABLET ORAL at 09:37

## 2019-12-22 RX ADMIN — INSULIN LISPRO 4 UNITS: 100 INJECTION, SOLUTION INTRAVENOUS; SUBCUTANEOUS at 08:39

## 2019-12-22 RX ADMIN — Medication 10 ML: at 13:12

## 2019-12-22 RX ADMIN — LISINOPRIL 40 MG: 20 TABLET ORAL at 09:37

## 2019-12-22 RX ADMIN — AMLODIPINE BESYLATE 10 MG: 10 TABLET ORAL at 09:37

## 2019-12-22 RX ADMIN — GUAIFENESIN 100 MG: 100 SOLUTION ORAL at 21:54

## 2019-12-22 NOTE — PROGRESS NOTES
Hospitalist Note     Admit Date:  2019  9:07 AM   Name:  Tammy Montes   Age:  53 y.o.  :  1975   MRN:  [id number]   PCP:  Delwin Cooks, MD      HPI/Subjective:      52 y/o smoker with DM, HTN, who presented to ER  with L leg and arm weakness, L facial droop, dysarthria. First noted L leg weakness late night  when he woke up to go to the bathroom. He was normal when he went to bed around 1030. Woke up and had persistent weakness L leg and also now noted in L arm. EMS called. Noted to have slurred speech and L facial droop as well. Code S called in ER around 9am.  MRI with acute infarcts in L cerebellar hemisphere, deep frontoparietal white matter, and R paramedian yariel. Also noted old lacunar infarcts. No large vessel occlusion on CTA, but some stenosis noted. No hx afib, TIA, CVA. No CP, palpitations, SOB. Interval History (): patient examined at bedside. No acute overnight events. Still having productive cough. No fevers/chills, chest pain, abdominal pain, nausea/vomiting, or diarrhea. Objective:     Patient Vitals for the past 24 hrs:   Temp Pulse Resp BP SpO2   19 1200 97.7 °F (36.5 °C) (!) 58 18 124/77 92 %   19 0800 97.4 °F (36.3 °C) 91 18 118/69 90 %   19 0400 96.2 °F (35.7 °C) (!) 55 18 (!) 126/93 98 %   19 0000 97.8 °F (36.6 °C) (!) 57 18 (!) 147/91 90 %   19 2000 97.4 °F (36.3 °C) 62 18 150/88 90 %   19 1749  72      19 1600 98.7 °F (37.1 °C) (!) 58 18 117/76 93 %     Oxygen Therapy  O2 Sat (%): 92 % (19 1200)  Pulse via Oximetry: 73 beats per minute (19 1615)  No intake or output data in the 24 hours ending 19 1330    Physical Exam:  General:    Well nourished. Alert and oriented. CV:   RRR. No murmur, rub, or gallop. Lungs:  Clear to auscultation bilaterally. No wheezing, rhonchi, or rales  Extremities: Warm and dry. No cyanosis or edema.   Neurologic: CN II-XII intact except for mild L facial droop. Sensation intact. Finger to nose intact. PERRLA. Mild slurred speech. No confusion. STR 1/5 LUE and LLE. Skin:     No rashes or jaundice. No wounds. Psych:  Normal mood and affect. I reviewed the labs, imaging, EKGs, telemetry, and other studies done this admission.   Data Review:   Recent Results (from the past 24 hour(s))   GLUCOSE, POC    Collection Time: 12/21/19  3:59 PM   Result Value Ref Range    Glucose (POC) 76 65 - 100 mg/dL   GLUCOSE, POC    Collection Time: 12/21/19  9:30 PM   Result Value Ref Range    Glucose (POC) 117 (H) 65 - 100 mg/dL   GLUCOSE, POC    Collection Time: 12/22/19  8:25 AM   Result Value Ref Range    Glucose (POC) 145 (H) 65 - 100 mg/dL   GLUCOSE, POC    Collection Time: 12/22/19 12:14 PM   Result Value Ref Range    Glucose (POC) 133 (H) 65 - 100 mg/dL       All Micro Results     None          Current Facility-Administered Medications   Medication Dose Route Frequency    guaiFENesin (ROBITUSSIN) 100 mg/5 mL oral liquid 100 mg  100 mg Oral Q4H PRN    insulin lispro (HUMALOG) injection 4 Units  4 Units SubCUTAneous TIDAC    metoprolol tartrate (LOPRESSOR) tablet 100 mg  100 mg Oral BID    hydrALAZINE (APRESOLINE) 20 mg/mL injection 20 mg  20 mg IntraVENous Q4H PRN    atorvastatin (LIPITOR) tablet 80 mg  80 mg Oral QHS    amLODIPine (NORVASC) tablet 10 mg  10 mg Oral DAILY    cloNIDine HCl (CATAPRES) tablet 0.1 mg  0.1 mg Oral Q4H PRN    lisinopril (PRINIVIL, ZESTRIL) tablet 40 mg  40 mg Oral DAILY    sodium chloride (NS) flush 5-40 mL  5-40 mL IntraVENous Q8H    sodium chloride (NS) flush 5-40 mL  5-40 mL IntraVENous PRN    ondansetron (ZOFRAN) injection 4 mg  4 mg IntraVENous Q4H PRN    aspirin chewable tablet 81 mg  81 mg Oral DAILY    acetaminophen (TYLENOL) tablet 650 mg  650 mg Oral Q4H PRN    polyethylene glycol (MIRALAX) packet 17 g  17 g Oral DAILY PRN    enoxaparin (LOVENOX) injection 40 mg  40 mg SubCUTAneous Q24H    insulin lispro (HUMALOG) injection   SubCUTAneous AC&HS    dextrose 40% (GLUTOSE) oral gel 1 Tube  15 g Oral PRN    glucagon (GLUCAGEN) injection 1 mg  1 mg IntraMUSCular PRN    dextrose (D50W) injection syrg 12.5-25 g  25-50 mL IntraVENous PRN       Other Studies:  No results found. Results for orders placed or performed during the hospital encounter of 19   2D ECHO COMPLETE ADULT (TTE) [address]  [address] [facility], [address] [facility]  [phone]    Transthoracic Echocardiogram  2D, M-mode, Doppler, and Color Doppler    Patient: aZchery Monroe  MR #: [id number]  : 1975  Age: 53 years  Gender: Male  Study date: 12-Dec-2019  Account #: [de-identified]  Height: 66 in  Weight: 209.7 lb  BSA: 2.04 mï¾²  Status:Routine  Location: ER03  BP: 133/ 76    Allergies: NO KNOWN ALLERGENS    Sonographer:  LUZ Ames  Group:  [facility]  Referring Physician:  Tarik Sanchez. Srinivasa Thomas MD  Reading Physician:  Jeff Elliott [address] Hahn MD [facility] [place]    INDICATIONS: CVA    PROCEDURE: This was a routine study. A transthoracic echocardiogram was  performed. The study included complete 2D imaging, M-mode, complete spectral  Doppler, and color Doppler. Intravenous contrast (Definity) was administered. Image quality was adequate. LEFT VENTRICLE: Size was normal. Systolic function was normal. Ejection  fraction was estimated in the range of 65 % to 70 %. There were no regional  wall motion abnormalities. There was mild concentric hypertrophy. Left  ventricular diastolic function parameters were normal. Avg E/e': 11.05.    RIGHT VENTRICLE: The size was normal. Systolic function was normal. The  tricuspid jet envelope definition was inadequate for estimation of RV   systolic  pressure. LEFT ATRIUM: Size was normal.    ATRIAL SEPTUM: Agitated saline contrast injection (bubble study) was   performed.   There was a right-to-left shunt, in the baseline state.    RIGHT ATRIUM: Size was normal.    SYSTEMIC VEINS: IVC: The inferior vena cava was normal in size and course. The  respirophasic change in diameter was more than 50%. AORTIC VALVE: The valve was trileaflet. Leaflets exhibited mild sclerosis. There was no evidence for stenosis. There was no insufficiency. MITRAL VALVE: Valve structure was normal. There was no evidence for stenosis. There was no regurgitation. TRICUSPID VALVE: The valve structure was normal. There was no evidence for  stenosis. There was trivial regurgitation. PULMONIC VALVE: Not well visualized. There was no evidence for stenosis. There  was no insufficiency. PERICARDIUM: There was no pericardial effusion. AORTA: The root exhibited normal size. SUMMARY:    -  Left ventricle: Systolic function was normal. Ejection fraction was  estimated in the range of 65 % to 70 %. There were no regional wall motion  abnormalities. There was mild concentric hypertrophy. -  Atrial septum: Agitated saline contrast injection (bubble study) was  performed. There was a right-to-left shunt, in the baseline state. -  Inferior vena cava, hepatic veins: The respirophasic change in diameter   was  more than 50%. SYSTEM MEASUREMENT TABLES    2D mode  AoR Diam (2D): 2.8 cm  LA Dimension (2D): 3.5 cm  Left Atrium Systolic Volume Index; Method of Disks, Biplane; 2D mode;: 26   ml/m2  IVS/LVPW (2D): 1  IVSd (2D): 1.2 cm  LVIDd (2D): 5.2 cm  LVIDs (2D): 2.9 cm  LVOT Area (2D): 2.3 cm2  LVPWd (2D): 1.2 cm  RVIDd (2D): 2.2 cm    Tissue Doppler Imaging  LV Peak Early Thomas Tissue Rc; Lateral MA (TDI): 8.1 cm/s  LV Peak Early Thomas Tissue Rc; Medial MA (TDI): 7.6 cm/s    Unspecified Scan Mode  Peak Grad; Mean; Antegrade Flow: 14 mm[Hg]  Vmax; Antegrade Flow: 185 cm/s  LVOT Diam: 1.7 cm  MV Peak Rc/LV Peak Tissue Rc E-Wave; Lateral MA: 10.7  MV Peak Rc/LV Peak Tissue Rc E-Wave; Medial MA: 11.4    Prepared and signed by    Ruma Moya.  Dewey MD [facility]  Signed 12-Dec-2019 15:46:58             Assessment and Plan:     # Acute embolic stroke with persistent LUE/LLE, TTE showing large PFO with shunt  - supportive and medical management  - continue high intensity management   - blood sugar control, goal CBG between 140-180 while inpatient  - PT/OT/speech therapy  - needs outpatient cardiology follow-up for shunt closure  - neurology has been following  - continue with neuro checks    # HTN  - continue current regimen    F/E/N: no fluids, replete electrolytes as needed, diet per speech therapy recommendations    Ppx: Lovenox for VTE    Code Status: FULL CODE    Disposition: patient has been medically clear for discharge but is without insurance, needs rehab/placement. Case management continues to follow. Signed:   Lachelle Alvares DO

## 2019-12-22 NOTE — PROGRESS NOTES
12/22/19 0711   NIH Stroke Scale   Interval Other (comment)  (dual NIH with Amy Glow RN)   LOC 0   LOC Questions 0   LOC Commands 0   Best Gaze 1   Visual 0   Facial Palsy 2   Motor Right Arm 0   Motor Left Arm 4   Motor Right Leg 0   Motor Left Leg 4   Limb Ataxia 0   Sensory 0   Best Language 2   Dysarthria 1   Extinction and Inattention 0   Total 14

## 2019-12-23 LAB
GLUCOSE BLD STRIP.AUTO-MCNC: 107 MG/DL (ref 65–100)
GLUCOSE BLD STRIP.AUTO-MCNC: 120 MG/DL (ref 65–100)
GLUCOSE BLD STRIP.AUTO-MCNC: 120 MG/DL (ref 65–100)
GLUCOSE BLD STRIP.AUTO-MCNC: 143 MG/DL (ref 65–100)

## 2019-12-23 PROCEDURE — 82962 GLUCOSE BLOOD TEST: CPT

## 2019-12-23 PROCEDURE — 74011250637 HC RX REV CODE- 250/637: Performed by: INTERNAL MEDICINE

## 2019-12-23 PROCEDURE — 65270000029 HC RM PRIVATE

## 2019-12-23 PROCEDURE — 74011250636 HC RX REV CODE- 250/636: Performed by: INTERNAL MEDICINE

## 2019-12-23 PROCEDURE — 74011250637 HC RX REV CODE- 250/637: Performed by: HOSPITALIST

## 2019-12-23 PROCEDURE — 65660000000 HC RM CCU STEPDOWN

## 2019-12-23 RX ADMIN — GUAIFENESIN 100 MG: 100 SOLUTION ORAL at 01:38

## 2019-12-23 RX ADMIN — METOPROLOL TARTRATE 100 MG: 50 TABLET, FILM COATED ORAL at 08:36

## 2019-12-23 RX ADMIN — AMLODIPINE BESYLATE 10 MG: 10 TABLET ORAL at 08:36

## 2019-12-23 RX ADMIN — ASPIRIN 81 MG 81 MG: 81 TABLET ORAL at 08:36

## 2019-12-23 RX ADMIN — METOPROLOL TARTRATE 100 MG: 50 TABLET, FILM COATED ORAL at 17:17

## 2019-12-23 RX ADMIN — Medication 5 ML: at 01:34

## 2019-12-23 RX ADMIN — LISINOPRIL 40 MG: 20 TABLET ORAL at 08:36

## 2019-12-23 RX ADMIN — ENOXAPARIN SODIUM 40 MG: 40 INJECTION SUBCUTANEOUS at 14:31

## 2019-12-23 RX ADMIN — Medication 5 ML: at 21:53

## 2019-12-23 RX ADMIN — GUAIFENESIN 100 MG: 100 SOLUTION ORAL at 21:53

## 2019-12-23 RX ADMIN — Medication 10 ML: at 14:33

## 2019-12-23 RX ADMIN — ATORVASTATIN CALCIUM 80 MG: 80 TABLET, FILM COATED ORAL at 21:52

## 2019-12-23 RX ADMIN — HYDRALAZINE HYDROCHLORIDE 20 MG: 20 INJECTION, SOLUTION INTRAMUSCULAR; INTRAVENOUS at 01:33

## 2019-12-23 NOTE — PROGRESS NOTES
12/23/19 0400   NIH Stroke Scale   Interval Other (comment)  (Neuro checks)   LOC 0   LOC Questions 0   LOC Commands 0   Motor Right Arm 0   Motor Left Arm 4   Motor Right Leg 0   Motor Left Leg 4   Dysarthria 1     Neuro checks

## 2019-12-23 NOTE — PROGRESS NOTES
Problem: Patient Education: Go to Patient Education Activity  Goal: Patient/Family Education  Outcome: Progressing Towards Goal     Problem: Pressure Injury - Risk of  Goal: *Prevention of pressure injury  Description  Document Dewey Scale and appropriate interventions in the flowsheet. Outcome: Progressing Towards Goal  Note: Pressure Injury Interventions:  Sensory Interventions: Assess changes in LOC, Assess need for specialty bed    Moisture Interventions: Absorbent underpads, Apply protective barrier, creams and emollients, Check for incontinence Q2 hours and as needed    Activity Interventions: Increase time out of bed, PT/OT evaluation    Mobility Interventions: HOB 30 degrees or less, PT/OT evaluation    Nutrition Interventions: Document food/fluid/supplement intake, Discuss nutritional consult with provider, Offer support with meals,snacks and hydration    Friction and Shear Interventions: Apply protective barrier, creams and emollients, Foam dressings/transparent film/skin sealants                Problem: Patient Education: Go to Patient Education Activity  Goal: Patient/Family Education  Outcome: Progressing Towards Goal     Problem: Falls - Risk of  Goal: *Absence of Falls  Description  [facility] and appropriate interventions in the flowsheet.   Outcome: Progressing Towards Goal  Note: Fall Risk Interventions:  Mobility Interventions: Bed/chair exit alarm, OT consult for ADLs, Patient to call before getting OOB, PT Consult for mobility concerns    Mentation Interventions: Bed/chair exit alarm, Door open when patient unattended    Medication Interventions: Evaluate medications/consider consulting pharmacy, Patient to call before getting OOB, Teach patient to arise slowly    Elimination Interventions: Bed/chair exit alarm, Call light in reach, Patient to call for help with toileting needs, Toileting schedule/hourly rounds    History of Falls Interventions: Bed/chair exit alarm, Consult care management for discharge planning, Door open when patient unattended         Problem: Patient Education: Go to Patient Education Activity  Goal: Patient/Family Education  Outcome: Progressing Towards Goal     Problem: Diabetes Self-Management  Goal: *Disease process and treatment process  Description  Define diabetes and identify own type of diabetes; list 3 options for treating diabetes. Outcome: Progressing Towards Goal  Goal: *Incorporating nutritional management into lifestyle  Description  Describe effect of type, amount and timing of food on blood glucose; list 3 methods for planning meals. Outcome: Progressing Towards Goal  Goal: *Incorporating physical activity into lifestyle  Description  State effect of exercise on blood glucose levels. Outcome: Progressing Towards Goal  Goal: *Developing strategies to promote health/change behavior  Description  Define the ABC's of diabetes; identify appropriate screenings, schedule and personal plan for screenings. Outcome: Progressing Towards Goal  Goal: *Using medications safely  Description  State effect of diabetes medications on diabetes; name diabetes medication taking, action and side effects. Outcome: Progressing Towards Goal  Goal: *Monitoring blood glucose, interpreting and using results  Description  Identify recommended blood glucose targets  and personal targets. Outcome: Progressing Towards Goal  Goal: *Prevention, detection, treatment of acute complications  Description  List symptoms of hyper- and hypoglycemia; describe how to treat low blood sugar and actions for lowering  high blood glucose level. Outcome: Progressing Towards Goal  Goal: *Prevention, detection and treatment of chronic complications  Description  Define the natural course of diabetes and describe the relationship of blood glucose levels to long term complications of diabetes.   Outcome: Progressing Towards Goal  Goal: *Developing strategies to address psychosocial issues  Description  Describe feelings about living with diabetes; identify support needed and support network  Outcome: Progressing Towards Goal     Problem: Patient Education: Go to Patient Education Activity  Goal: Patient/Family Education  Outcome: Progressing Towards Goal     Problem: Patient Education: Go to Patient Education Activity  Goal: Patient/Family Education  Outcome: Progressing Towards Goal     Problem: Nutrition Deficit  Goal: *Optimize nutritional status  Outcome: Progressing Towards Goal     Problem: Patient Education: Go to Patient Education Activity  Goal: Patient/Family Education  Outcome: Progressing Towards Goal     Problem: General Medical Care Plan  Goal: *Vital signs within specified parameters  Outcome: Progressing Towards Goal  Goal: *Labs within defined limits  Outcome: Progressing Towards Goal  Goal: *Absence of infection signs and symptoms  Description  Wash hand more often   Outcome: Progressing Towards Goal  Goal: *Optimal pain control at patient's stated goal  Outcome: Progressing Towards Goal  Goal: *Skin integrity maintained  Outcome: Progressing Towards Goal  Goal: *Fluid volume balance  Outcome: Progressing Towards Goal  Goal: *Optimize nutritional status  Outcome: Progressing Towards Goal  Goal: *Anxiety reduced or absent  Outcome: Progressing Towards Goal  Goal: *Progressive mobility and function (eg: ADL's)  Outcome: Progressing Towards Goal     Problem: Patient Education: Go to Patient Education Activity  Goal: Patient/Family Education  Outcome: Progressing Towards Goal     Problem: Patient Education: Go to Patient Education Activity  Goal: Patient/Family Education  Outcome: Progressing Towards Goal     Problem: Patient Education: Go to Patient Education Activity  Goal: Patient/Family Education  Outcome: Progressing Towards Goal     Problem: Patient Education: Go to Patient Education Activity  Goal: Patient/Family Education  Outcome: Progressing Towards Goal     Problem: TIA/CVA Stroke: Day 2 Until Discharge  Goal: Off Pathway (Use only if patient is Off Pathway)  Outcome: Progressing Towards Goal  Goal: Activity/Safety  Outcome: Progressing Towards Goal  Goal: Diagnostic Test/Procedures  Outcome: Progressing Towards Goal  Goal: Nutrition/Diet  Outcome: Progressing Towards Goal  Goal: Discharge Planning  Outcome: Progressing Towards Goal  Goal: Medications  Outcome: Progressing Towards Goal  Goal: Respiratory  Outcome: Progressing Towards Goal  Goal: Treatments/Interventions/Procedures  Outcome: Progressing Towards Goal  Goal: Psychosocial  Outcome: Progressing Towards Goal  Goal: *Verbalizes anxiety and depression are reduced or absent  Outcome: Progressing Towards Goal  Goal: *Absence of aspiration  Outcome: Progressing Towards Goal  Goal: *Absence of deep venous thrombosis signs and symptoms(Stroke Metric)  Outcome: Progressing Towards Goal  Goal: *Optimal pain control at patient's stated goal  Outcome: Progressing Towards Goal  Goal: *Tolerating diet  Outcome: Progressing Towards Goal  Goal: *Ability to perform ADLs and demonstrates progressive mobility and function  Outcome: Progressing Towards Goal  Goal: *Stroke education continued(Stroke Metric)  Outcome: Progressing Towards Goal     Problem: Ischemic Stroke: Discharge Outcomes  Goal: *Verbalizes anxiety and depression are reduced or absent  Outcome: Progressing Towards Goal  Goal: *Verbalize understanding of risk factor modification(Stroke Metric)  Outcome: Progressing Towards Goal  Goal: *Hemodynamically stable  Outcome: Progressing Towards Goal  Goal: *Absence of aspiration pneumonia  Outcome: Progressing Towards Goal  Goal: *Aware of needed dietary changes  Outcome: Progressing Towards Goal  Goal: *Verbalize understanding of prescribed medications including anti-coagulants, anti-lipid, and/or anti-platelets(Stroke Metric)  Outcome: Progressing Towards Goal  Goal: *Tolerating diet  Outcome: Progressing Towards Goal  Goal: *Aware of follow-up diagnostics related to anticoagulants  Outcome: Progressing Towards Goal  Goal: *Ability to perform ADLs and demonstrates progressive mobility and function  Outcome: Progressing Towards Goal  Goal: *Absence of DVT(Stroke Metric)  Outcome: Progressing Towards Goal  Goal: *Absence of aspiration  Outcome: Progressing Towards Goal  Goal: *Optimal pain control at patient's stated goal  Outcome: Progressing Towards Goal  Goal: *Home safety concerns addressed  Outcome: Progressing Towards Goal  Goal: *Describes available resources and support systems  Outcome: Progressing Towards Goal  Goal: *Verbalizes understanding of activation of EMS(911) for stroke symptoms(Stroke Metric)  Outcome: Progressing Towards Goal  Goal: *Understands and describes signs and symptoms to report to providers(Stroke Metric)  Outcome: Progressing Towards Goal  Goal: *Neurolgocially stable (absence of additional neurological deficits)  Outcome: Progressing Towards Goal  Goal: *Verbalizes importance of follow-up with primary care physician(Stroke Metric)  Outcome: Progressing Towards Goal  Goal: *Smoking cessation discussed,if applicable(Stroke Metric)  Outcome: Progressing Towards Goal  Goal: *Depression screening completed(Stroke Metric)  Outcome: Progressing Towards Goal

## 2019-12-23 NOTE — PROGRESS NOTES
12/23/19 0707   NIH Stroke Scale   Interval Other (comment)  (Dual NIH with Ethel RN)   LOC 0   LOC Questions 0   LOC Commands 0   Best Gaze 0   Visual 0   Facial Palsy 2   Motor Right Arm 0   Motor Left Arm 4   Motor Right Leg 0   Motor Left Leg 4   Limb Ataxia 0   Sensory 0   Best Language 0   Dysarthria 1   Extinction and Inattention 0   Total 11     Dual NIH with Ethel RN     Ischemic Stroke without Activase/TIA     VTE Prophylaxis: Yes: Lovenox     Antiplatelet: Yes: aspirin     Statin if LDL Greater Than or Equal to100: Yes: Lipitor     BP Parameters: Less Than 160 SBP     Controlled With: Scheduled PO, PRN - IV and PRN - PO     Dysphagia Screen Completed: Yes: Fail  Dysphagia Screening  Vocal Quality/Secretions: (!) Abnormal  History of Dysphagia: No  O2 Saturation: Normal  Alertness: Normal  Pre-Swallow Assessment Score: 1   Cleared by Speech for Mechanical Soft, Thin Liquids, Pills whole in applesauce     Patient has PEG, NG Tube, Feeding Tube: No     Medication orders per above route: Yes     Nutrition Status: PO     NIH Stroke Scale Complete: Yes: 11     Frequency of Vital Signs: Every 4 hours     Frequency of Neuro Checks: Every 4 hours     Daily Education/Care Plan Updated: Yes      Bedside report to Yuepu Sifang

## 2019-12-23 NOTE — PROGRESS NOTES
Hospitalist Note     Admit Date:  2019  9:07 AM   Name:  Ray Nevarez   Age:  53 y.o.  :  1975   MRN:  [id number]   PCP:  Mackenzie Negron MD      HPI/Subjective:      52 y/o smoker with DM, HTN, who presented to ER  with L leg and arm weakness, L facial droop, dysarthria. First noted L leg weakness late night  when he woke up to go to the bathroom. He was normal when he went to bed around 1030. Woke up and had persistent weakness L leg and also now noted in L arm. EMS called. Noted to have slurred speech and L facial droop as well. Code S called in ER around 9am.  MRI with acute infarcts in L cerebellar hemisphere, deep frontoparietal white matter, and R paramedian yariel. Also noted old lacunar infarcts. No large vessel occlusion on CTA, but some stenosis noted. No hx afib, TIA, CVA. No CP, palpitations, SOB. Interval History (): patient examined at bedside. No acute overnight events. No fevers/chills, chest pain, abdominal pain, nausea/vomiting, or diarrhea. Objective:     Patient Vitals for the past 24 hrs:   Temp Pulse Resp BP SpO2   19 1200 98.9 °F (37.2 °C) (!) 53 18 137/74 95 %   19 0800 97.4 °F (36.3 °C) 60 17 132/87 97 %   19 0400 97.9 °F (36.6 °C) (!) 59 17 117/75 90 %   19 0235 98 °F (36.7 °C) 61 18 121/78 97 %   19 0000 98.2 °F (36.8 °C) (!) 56 17 165/90 96 %   19 2000 98.3 °F (36.8 °C) (!) 57 18 131/85 94 %   19 1600 98.2 °F (36.8 °C) 63 18 145/89 96 %     Oxygen Therapy  O2 Sat (%): 95 % (19 1200)  Pulse via Oximetry: 73 beats per minute (19 1615)    Intake/Output Summary (Last 24 hours) at 2019 1302  Last data filed at 2019 0900  Gross per 24 hour   Intake 240 ml   Output    Net 240 ml       Physical Exam:  General:    Well nourished. Alert and oriented. CV:   RRR. No murmur, rub, or gallop. Lungs:  Clear to auscultation bilaterally.   No wheezing, rhonchi, or rales  Extremities: Warm and dry. No cyanosis or edema. Neurologic: CN II-XII intact except for mild L facial droop. Sensation intact. Finger to nose intact. PERRLA. Mild slurred speech. No confusion. STR 1/5 LUE and LLE. Skin:     No rashes or jaundice. No wounds. Psych:  Normal mood and affect. I reviewed the labs, imaging, EKGs, telemetry, and other studies done this admission.   Data Review:   Recent Results (from the past 24 hour(s))   GLUCOSE, POC    Collection Time: 12/22/19  4:50 PM   Result Value Ref Range    Glucose (POC) 125 (H) 65 - 100 mg/dL   GLUCOSE, POC    Collection Time: 12/22/19  9:09 PM   Result Value Ref Range    Glucose (POC) 100 65 - 100 mg/dL   GLUCOSE, POC    Collection Time: 12/23/19  7:09 AM   Result Value Ref Range    Glucose (POC) 107 (H) 65 - 100 mg/dL   GLUCOSE, POC    Collection Time: 12/23/19 11:06 AM   Result Value Ref Range    Glucose (POC) 143 (H) 65 - 100 mg/dL       All Micro Results     None          Current Facility-Administered Medications   Medication Dose Route Frequency    guaiFENesin (ROBITUSSIN) 100 mg/5 mL oral liquid 100 mg  100 mg Oral Q4H PRN    insulin lispro (HUMALOG) injection 4 Units  4 Units SubCUTAneous TIDAC    metoprolol tartrate (LOPRESSOR) tablet 100 mg  100 mg Oral BID    hydrALAZINE (APRESOLINE) 20 mg/mL injection 20 mg  20 mg IntraVENous Q4H PRN    atorvastatin (LIPITOR) tablet 80 mg  80 mg Oral QHS    amLODIPine (NORVASC) tablet 10 mg  10 mg Oral DAILY    cloNIDine HCl (CATAPRES) tablet 0.1 mg  0.1 mg Oral Q4H PRN    lisinopril (PRINIVIL, ZESTRIL) tablet 40 mg  40 mg Oral DAILY    sodium chloride (NS) flush 5-40 mL  5-40 mL IntraVENous Q8H    sodium chloride (NS) flush 5-40 mL  5-40 mL IntraVENous PRN    ondansetron (ZOFRAN) injection 4 mg  4 mg IntraVENous Q4H PRN    aspirin chewable tablet 81 mg  81 mg Oral DAILY    acetaminophen (TYLENOL) tablet 650 mg  650 mg Oral Q4H PRN    polyethylene glycol (MIRALAX) packet 17 g  17 g Oral DAILY PRN    enoxaparin (LOVENOX) injection 40 mg  40 mg SubCUTAneous Q24H    insulin lispro (HUMALOG) injection   SubCUTAneous AC&HS    dextrose 40% (GLUTOSE) oral gel 1 Tube  15 g Oral PRN    glucagon (GLUCAGEN) injection 1 mg  1 mg IntraMUSCular PRN    dextrose (D50W) injection syrg 12.5-25 g  25-50 mL IntraVENous PRN       Other Studies:  No results found. Results for orders placed or performed during the hospital encounter of 19   2D ECHO COMPLETE ADULT (TTE) [address]  [address] [facility], [address]  [phone]    Transthoracic Echocardiogram  2D, M-mode, Doppler, and Color Doppler    Patient: Renae Alba  MR #: [id number]  : 1975  Age: 53 years  Gender: Male  Study date: 12-Dec-2019  Account #: [de-identified]  Height: 66 in  Weight: 209.7 lb  BSA: 2.04 mï¾²  Status:Routine  Location: ER03  BP: 133/ 76    Allergies: NO KNOWN ALLERGENS    Sonographer:  Emily Peres [facility]  Group:  [id number] Cardiology  Referring Physician:  Nicolas Dennis. Stacy Price MD  Reading Physician:  Denia Dillard MD [facility]    INDICATIONS: CVA    PROCEDURE: This was a routine study. A transthoracic echocardiogram was  performed. The study included complete 2D imaging, M-mode, complete spectral  Doppler, and color Doppler. Intravenous contrast (Definity) was administered. Image quality was adequate. LEFT VENTRICLE: Size was normal. Systolic function was normal. Ejection  fraction was estimated in the range of 65 % to 70 %. There were no regional  wall motion abnormalities. There was mild concentric hypertrophy. Left  ventricular diastolic function parameters were normal. Avg E/e': 11.05.    RIGHT VENTRICLE: The size was normal. Systolic function was normal. The  tricuspid jet envelope definition was inadequate for estimation of RV   systolic  pressure.     LEFT ATRIUM: Size was normal.    ATRIAL SEPTUM: Agitated saline contrast injection (bubble study) was   performed. There was a right-to-left shunt, in the baseline state. RIGHT ATRIUM: Size was normal.    SYSTEMIC VEINS: IVC: The inferior vena cava was normal in size and course. The  respirophasic change in diameter was more than 50%. AORTIC VALVE: The valve was trileaflet. Leaflets exhibited mild sclerosis. There was no evidence for stenosis. There was no insufficiency. MITRAL VALVE: Valve structure was normal. There was no evidence for stenosis. There was no regurgitation. TRICUSPID VALVE: The valve structure was normal. There was no evidence for  stenosis. There was trivial regurgitation. PULMONIC VALVE: Not well visualized. There was no evidence for stenosis. There  was no insufficiency. PERICARDIUM: There was no pericardial effusion. AORTA: The root exhibited normal size. SUMMARY:    -  Left ventricle: Systolic function was normal. Ejection fraction was  estimated in the range of 65 % to 70 %. There were no regional wall motion  abnormalities. There was mild concentric hypertrophy. -  Atrial septum: Agitated saline contrast injection (bubble study) was  performed. There was a right-to-left shunt, in the baseline state. -  Inferior vena cava, hepatic veins: The respirophasic change in diameter   was  more than 50%. SYSTEM MEASUREMENT TABLES    2D mode  AoR Diam (2D): 2.8 cm  LA Dimension (2D): 3.5 cm  Left Atrium Systolic Volume Index; Method of Disks, Biplane; 2D mode;: 26   ml/m2  IVS/LVPW (2D): 1  IVSd (2D): 1.2 cm  LVIDd (2D): 5.2 cm  LVIDs (2D): 2.9 cm  LVOT Area (2D): 2.3 cm2  LVPWd (2D): 1.2 cm  RVIDd (2D): 2.2 cm    Tissue Doppler Imaging  LV Peak Early Thomas Tissue Rc; Lateral MA (TDI): 8.1 cm/s  LV Peak Early Thomas Tissue Rc; Medial MA (TDI): 7.6 cm/s    Unspecified Scan Mode  Peak Grad; Mean; Antegrade Flow: 14 mm[Hg]  Vmax;  Antegrade Flow: 185 cm/s  LVOT Diam: 1.7 cm  MV Peak Rc/LV Peak Tissue Rc E-Wave; Lateral MA: 10.7  MV Peak Rc/LV Peak Tissue Rc E-Wave; Medial MA: 11.4    Prepared and signed by    Joanne Parham. Nick Lopez MD [facility]  Signed 12-Dec-2019 15:46:58             Assessment and Plan:     # Acute embolic stroke with persistent LUE/LLE, TTE showing large PFO with shunt  - supportive and medical management  - continue high intensity management   - blood sugar control, goal CBG between 140-180 while inpatient  - PT/OT/speech therapy  - needs outpatient cardiology follow-up for shunt closure  - neurology has been following  - continue with neuro checks    # HTN  - continue current regimen    F/E/N: no fluids, replete electrolytes as needed, diet per speech therapy recommendations    Ppx: Lovenox for VTE    Code Status: FULL CODE    Disposition: patient has been medically clear for discharge but is without insurance, needs rehab/placement. Case management continues to follow. Signed:   Gracy Sam DO

## 2019-12-23 NOTE — PROGRESS NOTES
12/23/19 0021   NIH Stroke Scale   Interval Other (comment)  (Neuro checks)   LOC 0   LOC Questions 0   LOC Commands 0   Motor Right Arm 0   Motor Left Arm 4   Motor Right Leg 0   Motor Left Leg 4   Dysarthria 1     Neuro checks

## 2019-12-23 NOTE — PROGRESS NOTES
SPEECH PATHOLOGY NOTE:    Attempted to see patient this afternoon for speech therapy. Patient asleep upon entry. Eyes opened to voice, however unable to maintain alertness to participate. Falling back asleep. Will re-attempt at later time/date as schedule permits.          Layla [address] CCC-SLP

## 2019-12-23 NOTE — PROGRESS NOTES
12/22/19 1924   NIH Stroke Scale   Interval Other (comment)  (With JERRY Gay )   LOC 0   LOC Questions 0   LOC Commands 0   Best Gaze 0   Visual 0   Facial Palsy 2   Motor Right Arm 0   Motor Left Arm 4   Motor Right Leg 0   Motor Left Leg 4   Limb Ataxia 0   Sensory 0   Best Language 0   Dysarthria 1   Extinction and Inattention 0   Total 11

## 2019-12-23 NOTE — PROGRESS NOTES
Nutrition follow-up:     Assessment:   Diet: DIET NUTRITIONAL SUPPLEMENTS All Meals; Glucerna Shake  DIET DIABETIC CONSISTENT CARB Mechanical Soft; AHA-LOW-CHOL FAT     Food/Nutrition Patient History:    Pt is 52 yo male who presented with weakness, facial drooping and slurred speech. He has history of CVA, DM type 2, HTN, pancreatitis, GERD and a smoker. Diet progressed per SLP 12/17. Visit with patient and wife at bedside. Pt answers some questions, others wife responds for him. He takes a drink independently during my visit. She indicates he attempts self feeding often has spillage of foods. Results for Effie Randolph (MRN [id number]) as of 12/23/2019 14:31   12/22/2019 12:14 12/22/2019 16:50 12/22/2019 21:09 12/23/2019 07:09 12/23/2019 11:06   GLUCOSE,FAST -  (H) 125 (H) 100 107 (H) 143 (H)     Anthropometrics:  Height: 5' 6\" (167.6 cm), Weight: 95.3 kg (210 lb), source not specified, Body mass index is 33.89 kg/m². BMI class of obese. Last 3 Recorded Weights in this Encounter    12/12/19 0906   Weight: 95.3 kg (210 lb)   Macronutrient needs: MSJ (using CBW (Current body weight) unknown 95.3 kg)  EER: 2130 kcal/day (22 kcals/kg )  EPR: 106 g/day (1.1 g/kg ) (20%)     Intake/Comparative Standards: Recorded po 50-80%. Pt and family confirm intake and report % of glucerna, one opened serving at bedside ~50% consumed. Pt potentially meeting % of kcal and protein needs.     Nutrition Intervention:  Meals and Snacks: Continue with current diet. Progressions per SLP. Commercial Beverages and Supplements: Continue Glucerna TID in vanilla. Consider decrease if intake of foods continues to improve or if blood glucose increases. Coordination of Care: Discussed with Matt Joseph RN.    Discharge Nutrition Plan: Too soon to determine    Alexis [place], [address], [address]

## 2019-12-23 NOTE — PROGRESS NOTES
12/22/19 2200   NIH Stroke Scale   Interval Other (comment)  (neuro checks)   LOC 0   LOC Questions 0   LOC Commands 0   Motor Right Arm 0   Motor Left Arm 4   Motor Right Leg 0   Motor Left Leg 4   Dysarthria 1     Neuro checks

## 2019-12-24 LAB
CREAT SERPL-MCNC: 1.32 MG/DL (ref 0.8–1.5)
GLUCOSE BLD STRIP.AUTO-MCNC: 121 MG/DL (ref 65–100)
GLUCOSE BLD STRIP.AUTO-MCNC: 124 MG/DL (ref 65–100)
GLUCOSE BLD STRIP.AUTO-MCNC: 158 MG/DL (ref 65–100)
GLUCOSE BLD STRIP.AUTO-MCNC: 166 MG/DL (ref 65–100)
HGB BLD-MCNC: 11.9 G/DL (ref 13.6–17.2)
PLATELET # BLD AUTO: 319 K/UL (ref 150–450)

## 2019-12-24 PROCEDURE — 85018 HEMOGLOBIN: CPT

## 2019-12-24 PROCEDURE — 74011250637 HC RX REV CODE- 250/637: Performed by: HOSPITALIST

## 2019-12-24 PROCEDURE — 65660000000 HC RM CCU STEPDOWN

## 2019-12-24 PROCEDURE — 74011636637 HC RX REV CODE- 636/637: Performed by: INTERNAL MEDICINE

## 2019-12-24 PROCEDURE — 97530 THERAPEUTIC ACTIVITIES: CPT

## 2019-12-24 PROCEDURE — 36415 COLL VENOUS BLD VENIPUNCTURE: CPT

## 2019-12-24 PROCEDURE — 74011636637 HC RX REV CODE- 636/637: Performed by: HOSPITALIST

## 2019-12-24 PROCEDURE — 82565 ASSAY OF CREATININE: CPT

## 2019-12-24 PROCEDURE — 85049 AUTOMATED PLATELET COUNT: CPT

## 2019-12-24 PROCEDURE — 74011250637 HC RX REV CODE- 250/637: Performed by: INTERNAL MEDICINE

## 2019-12-24 PROCEDURE — 82962 GLUCOSE BLOOD TEST: CPT

## 2019-12-24 PROCEDURE — 65270000029 HC RM PRIVATE

## 2019-12-24 PROCEDURE — 74011250636 HC RX REV CODE- 250/636: Performed by: INTERNAL MEDICINE

## 2019-12-24 PROCEDURE — 97110 THERAPEUTIC EXERCISES: CPT

## 2019-12-24 RX ADMIN — ASPIRIN 81 MG 81 MG: 81 TABLET ORAL at 08:26

## 2019-12-24 RX ADMIN — LISINOPRIL 40 MG: 20 TABLET ORAL at 08:25

## 2019-12-24 RX ADMIN — INSULIN LISPRO 4 UNITS: 100 INJECTION, SOLUTION INTRAVENOUS; SUBCUTANEOUS at 08:27

## 2019-12-24 RX ADMIN — GUAIFENESIN 100 MG: 100 SOLUTION ORAL at 22:31

## 2019-12-24 RX ADMIN — Medication 10 ML: at 22:32

## 2019-12-24 RX ADMIN — INSULIN LISPRO 2 UNITS: 100 INJECTION, SOLUTION INTRAVENOUS; SUBCUTANEOUS at 17:52

## 2019-12-24 RX ADMIN — INSULIN LISPRO 4 UNITS: 100 INJECTION, SOLUTION INTRAVENOUS; SUBCUTANEOUS at 12:00

## 2019-12-24 RX ADMIN — AMLODIPINE BESYLATE 10 MG: 10 TABLET ORAL at 08:26

## 2019-12-24 RX ADMIN — ATORVASTATIN CALCIUM 80 MG: 80 TABLET, FILM COATED ORAL at 22:32

## 2019-12-24 RX ADMIN — INSULIN LISPRO 2 UNITS: 100 INJECTION, SOLUTION INTRAVENOUS; SUBCUTANEOUS at 12:00

## 2019-12-24 RX ADMIN — Medication 10 ML: at 13:09

## 2019-12-24 RX ADMIN — INSULIN LISPRO 4 UNITS: 100 INJECTION, SOLUTION INTRAVENOUS; SUBCUTANEOUS at 17:52

## 2019-12-24 RX ADMIN — ENOXAPARIN SODIUM 40 MG: 40 INJECTION SUBCUTANEOUS at 16:27

## 2019-12-24 NOTE — PROGRESS NOTES
12/23/19 2155   NIH Stroke Scale   Interval Other (comment)  (Neuro checks)   LOC 0   LOC Questions 0   LOC Commands 0   Motor Right Arm 0   Motor Left Arm 4   Motor Right Leg 0   Motor Left Leg 4   Dysarthria 1     Neuro checks

## 2019-12-24 NOTE — PROGRESS NOTES
12/24/19 0400   NIH Stroke Scale   Interval Other (comment)  (Neuro checks)   LOC 0   LOC Questions 0   LOC Commands 0   Motor Right Arm 0   Motor Left Arm 4   Motor Right Leg 0   Motor Left Leg 4   Dysarthria 1     Neuro checks

## 2019-12-24 NOTE — PROGRESS NOTES
12/24/19 0719   NIH Stroke Scale   Interval Other (comment)  (Dual NIH with Louise Peña RN)   LOC 0   LOC Questions 0   LOC Commands 0   Best Gaze 0   Visual 0   Facial Palsy 2   Motor Right Arm 0   Motor Left Arm 4   Motor Right Leg 0   Motor Left Leg 4   Limb Ataxia 0   Sensory 0   Best Language 0   Dysarthria 1   Extinction and Inattention 0   Total 11     Dual NIH with Ethel EDWARDS     Ischemic Stroke without Activase/TIA     VTE Prophylaxis: Yes: Lovenox     Antiplatelet: Yes: aspirin     Statin if LDL Greater Than or Equal to100: Yes: Lipitor     BP Parameters: Less Than 160 SBP     Controlled With: Scheduled PO, PRN - IV and PRN - PO     Dysphagia Screen Completed: Yes: Fail  Dysphagia Screening  Vocal Quality/Secretions: (!) Abnormal  History of Dysphagia: No  O2 Saturation: Normal  Alertness: Normal  Pre-Swallow Assessment Score: 1   Cleared by Speech for Mechanical Soft, Thin Liquids, Pills whole in applesauce     Patient has PEG, NG Tube, Feeding Tube: No     Medication orders per above route: Yes     Nutrition Status: PO     NIH Stroke Scale Complete: Yes: 11     Frequency of Vital Signs: Every 4 hours     Frequency of Neuro Checks: Every 4 hours     Daily Education/Care Plan Updated: Yes      Bedside report to [employer]

## 2019-12-24 NOTE — PROGRESS NOTES
12/23/19 1916   NIH Stroke Scale   Interval Other (comment)  (dual NIH with Marlene EDWARDS)   LOC 0   LOC Questions 0   LOC Commands 0   Best Gaze 0   Visual 0   Facial Palsy 2   Motor Right Arm 0   Motor Left Arm 4   Motor Right Leg 0   Motor Left Leg 4   Limb Ataxia 0   Sensory 0   Best Language 0   Dysarthria 1   Extinction and Inattention 0   Total 11

## 2019-12-24 NOTE — PROGRESS NOTES
12/24/19 0000   NIH Stroke Scale   Interval Other (comment)   LOC 0   LOC Questions 0   LOC Commands 0   Motor Right Arm 0   Motor Left Arm 4   Motor Right Leg 0   Motor Left Leg 4   Dysarthria 1     Neuro checks

## 2019-12-24 NOTE — PROGRESS NOTES
Problem: Mobility Impaired (Adult and Pediatric)  Goal: *Acute Goals and Plan of Care  Description  STG:  (1.) Mr. Latrell Collnis will move from supine to sit and sit to supine , scoot up and down and roll side to side with MINIMAL ASSIST within 3 treatment day(s). (2.) Mr. Latrell Collins will transfer from bed to chair and chair to bed with MODERATE ASSIST using the least restrictive device within 3 treatment day(s). (3.) Mr. Latrell Collins will perform standing static and dynamic balance activities x 10 minutes with MODERATE ASSISTx1 to improve safety within 3 treatment day(s). LTG:  (1.) Mr. Latrell Collins will move from supine to sit and sit to supine , scoot up and down and roll side to side with CONTACT GUARD ASSIST within 7 treatment day(s). (2.) Mr. Latrell Collins will transfer from bed to chair and chair to bed with MINIMAL ASSIST using the least restrictive device within 7 treatment day(s). (3.) Mr. Latrell Collins will ambulate with MODERATE ASSIST for 50+ feet with the least restrictive device within 7 treatment day(s). (4.) Mr. Latrell Collins will perform standing static and dynamic balance activities x 20 minutes with MINIMAL ASSIST to improve safety within 7 treatment day(s). (5.) Mr. Latrell Collins will perform bilateral lower extremity exercises x 15 min for HEP with SUPERVISION to improve strength, endurance, and functional mobility within 7 treatment day(s).      PHYSICAL THERAPY: Daily Note and AM 12/24/2019  INPATIENT: PT Visit Days : 5  Payor: MEDICAID PENDING / Plan: [employer] PENDING / Product Type: Medicaid /       NAME/AGE/GENDER: Sandra Allen is a 53 y.o. male   PRIMARY DIAGNOSIS: Acute ischemic stroke ([address] [I63.9]  Cerebrovascular accident (CVA) due to embolism ([address] [I63.9] Acute ischemic stroke ([address] Acute ischemic stroke ([address]       ICD-10: Treatment Diagnosis:   · Other lack of cordination (R27.8)  · Difficulty in walking, Not elsewhere classified (R26.2)  · Other abnormalities of gait and mobility (R26.89)  · Unspecified Lack of Coordination (R27.9)  · Hemiplegia and hemiparesis following cerebral infarction affecting   · left non-dominant side (X25.369)   Precaution/Allergies:  Patient has no known allergies. ASSESSMENT:     Mr. Breanna Colbert is a 53year old M who presents to hospital with L sided facial droop and LUE and LLE weakness. MRI indicated acute infarcts in L cerebellar hemisphere, deep frontoparietal white matter and R paramedian yariel. Prior to hospital admission pt lives with his wife in a one story apartment on the second level with 12 steps to access. Pt endorses no falls in past 6 months. Prior to admission Mr. Breanna Colbert uses no DME for mobility. He reports he had been working full time but got laid off, so now he works just a few days at [employer], which requires him being on his feet. Upon entering, pt resting in bed, wife at bedside and agreeable to therapy with note able speech impairment. He performed supine exercises below with good ability R and still flaccid on the L. He rolled to the L with assistance with his legs then sat up to the EOB Max A. Worked on scooting his R hip toward the EOB needing visual demonstration by me to understand concept of using R arm to scoot his hip forward. He worked on sitting balance needing constant verbal cues. Practiced sit to stand with hemiwalker again responding well to verbal cues for erect posture and finding balance and does not tend to figure this out on his own. We assisted him to the chair with mod A x2. Limited progress as he is getting limited therapy given his situation being stuck in the hospital with no insurance. This section established at most recent assessment   PROBLEM LIST (Impairments causing functional limitations):  1. Decreased Strength  2. Decreased ADL/Functional Activities  3. Decreased Transfer Abilities  4. Decreased Ambulation Ability/Technique  5. Decreased Balance  6. Increased Pain  7.  Decreased Activity Tolerance  8. Increased Fatigue  9. Decreased Flexibility/Joint Mobility   INTERVENTIONS PLANNED: (Benefits and precautions of physical therapy have been discussed with the patient.)  1. Balance Exercise  2. Bed Mobility  3. Family Education  4. Gait Training  5. Home Exercise Program (HEP)  6. Manual Therapy  7. Neuromuscular Re-education/Strengthening  8. Range of Motion (ROM)  9. Therapeutic Activites  10. Therapeutic Exercise/Strengthening  11. Transfer Training     TREATMENT PLAN: Frequency/Duration: 3 times a week for duration of hospital stay  Rehabilitation Potential For Stated Goals: [facility] (at time of discharge pending progress):    Placement: It is my opinion, based on this patient's performance to date, that Mr. Naun Hampton may benefit from intensive therapy at an [address] [place] [address] after discharge due to a probable need for 24 hour rehab nursing, a probable need for multiple therapy disciplines, and potential to make ongoing and sustainable functional improvement that is of practical value. .  Equipment:    TBD pending progress with therapy. HISTORY:   History of Present Injury/Illness (Reason for Referral):  Per H&P: \"Pt is a 54 y/o smoker with DM, HTN, who presented to ER with L leg and arm weakness, L facial droop, dysarthria. First noted L leg weakness late night 12/11 when he woke up to go to the bathroom. He was normal when he went to bed around 1030. Woke up this morning and had persistent weakness L leg and also now noted in L arm. EMS called. Noted to have slurred speech and L facial droop as well. Code S called in ER around 9am.  MRI with acute infarcts in L cerebellar hemisphere, deep frontoparietal white matter, and R paramedian yariel. Also noted old lacunar infarcts. No large vessel occlusion on CTA, but some stenosis noted. No hx afib, TIA, CVA. No CP, palpitations, SOB. \"  Past Medical History/Comorbidities:   Mr. Naun Hampton  has a past medical history of Acute ischemic stroke ([facility] [address] [facility] (12/12/2019), Acute pancreatitis (11/19/2014), Cerebrovascular accident (CVA) due to embolism ([facility] [address] [facility] (12/12/2019), Diabetes ([address] (2002), Diabetes ([address] Diabetes mellitus, and Hypertension. He also has no past medical history of Arthritis, Asthma, Autoimmune disease ([address] CAD (coronary artery disease), Cancer ([address] Chronic kidney disease, COPD, Dementia, Dementia ([address] Heart failure ([address] Ill-defined condition, Infectious disease, Liver disease, Other ill-defined conditions(799.89), Psychiatric disorder, PUD (peptic ulcer disease), Seizures ([address] or Sleep disorder. Mr. Joanne Beach  has a past surgical history that includes hx hernia repair and hx orthopaedic. Social History/Living Environment:   Home Environment: Apartment  # Steps to Enter: 12  Rails to Enter: Yes  [employer] : Bilateral  One/Two Story Residence: One story  Living Alone: No  Support Systems: Spouse/Significant Other/Partner  Patient Expects to be Discharged to[de-identified] Unknown  Current DME Used/Available at Home: None  Tub or Shower Type: Tub/Shower combination  Prior Level of Function/Work/Activity:  Independent, lives with wife in 2nd story 1 level apartment. No recent falls. Number of Personal Factors/Comorbidities that affect the Plan of Care: 1-2: MODERATE COMPLEXITY   EXAMINATION:   Most Recent Physical Functioning:   Gross Assessment:                  Posture:     Balance:  Standing - Static: Poor  Standing - Dynamic : Poor Bed Mobility:  Rolling: Minimum assistance; Moderate assistance  Scooting: Maximum assistance  Wheelchair Mobility:     Transfers:  Sit to Stand: Moderate assistance;Maximum assistance;Assist x2  Stand to Sit: Moderate assistance;Assist x2  Bed to Chair: Moderate assistance;Assist x2  Gait:            Body Structures Involved:  1. Nerves  2. Voice/Speech  3. Bones  4. Joints  5.  Muscles Body Functions Affected:  1. Mental  2. Sensory/Pain  3. Neuromusculoskeletal  4. Movement Related Activities and Participation Affected:  1. General Tasks and Demands  2. Mobility  3. Self Care  4. Interpersonal Interactions and Relationships   Number of elements that affect the Plan of Care: 4+: HIGH COMPLEXITY   CLINICAL PRESENTATION:   Presentation: Evolving clinical presentation with changing clinical characteristics: MODERATE COMPLEXITY   CLINICAL DECISION MAKIN [facility] Mobility Inpatient Short Form  How much difficulty does the patient currently have. .. Unable A Lot A Little None   1. Turning over in bed (including adjusting bedclothes, sheets and blankets)? [] 1   [] 2   [x] 3   [] 4   2. Sitting down on and standing up from a chair with arms ( e.g., wheelchair, bedside commode, etc.)   [] 1   [x] 2   [] 3   [] 4   3. Moving from lying on back to sitting on the side of the bed? [] 1   [x] 2   [] 3   [] 4   How much help from another person does the patient currently need. .. Total A Lot A Little None   4. Moving to and from a bed to a chair (including a wheelchair)? [] 1   [x] 2   [] 3   [] 4   5. Need to walk in hospital room? [] 1   [x] 2   [] 3   [] 4   6. Climbing 3-5 steps with a railing? [] 1   [x] 2   [] 3   [] 4   © , Trustees of [facility] [employer], under license to [employer]. All rights reserved      Score:  Initial: 13 Most Recent: X (Date: -- )    Interpretation of Tool:  Represents activities that are increasingly more difficult (i.e. Bed mobility, Transfers, Gait). Medical Necessity:     · Patient is expected to demonstrate progress in   · strength, range of motion, balance, coordination, and functional technique  ·  to   · increase independence with all mobility.    · .  Reason for Services/Other Comments:  · Patient continues to require skilled intervention due to   · medical complications and mobility deficits which impact his level of function, safety, and independence as indicated above. · .   Use of outcome tool(s) and clinical judgement create a POC that gives a: Questionable prediction of patient's progress: MODERATE COMPLEXITY        TREATMENT:   (In addition to Assessment/Re-Assessment sessions the following treatments were rendered)   Pre-treatment Symptoms/Complaints:  \"ok\"  Pain: Initial:   Pain Intensity 1: 0  Post Session:  No pain reported at end of session, resting comfortably in bed. Therapeutic Activity: (    25 Minutes): Therapeutic activities including bed mobility, rolling, scooting, sit <> stand transfer training  bed to improve mobility, strength, balance, and coordination. Required maximal cues with moderate physical assist   to promote coordination of bilateral, upper extremity(s), lower extremity(s) and promote motor control of bilateral, upper extremity(s), lower extremity(s). Therapeutic Exercise: (15 Minutes):  Exercises per grid below to improve mobility, strength and coordination. Required moderate verbal cues to promote proper body mechanics. Progressed complexity of movement as indicated. Date:  12/24/19 Date:   Date:     Activity/Exercise Parameters Parameters Parameters   Supine heel slides 10x B   PROM L     Supine SLR 10x R     Supine bridging 10x holding L knee bent     Supine LTR 10x ea direction                              Braces/Orthotics/Lines/Etc:   · O2 Device: Room Air   Treatment/Session Assessment:    · Response to Treatment:  see above. Works very hard with therapy.   · Interdisciplinary Collaboration:   o Physical Therapy Assistant  o Registered Nurse  o Rehabilitation Attendant  · After treatment position/precautions:   o Up in chair  o Bed alarm/tab alert on  o Bed/Chair-wheels locked  o Bed in low position  o Call light within reach  o RN notified  o Family at bedside   · Compliance with Program/Exercises: Compliant all of the time  · Recommendations/Intent for next treatment session: \"Next visit will focus on advancements to more challenging activities and reduction in assistance provided\".     Total Treatment Duration:  PT Patient Time In/Time Out  Time In: 1100  Time Out: James [address], PTA

## 2019-12-24 NOTE — PROGRESS NOTES
Problem: Mobility Impaired (Adult and Pediatric)  Goal: *Acute Goals and Plan of Care  Description  STG:  (1.) Mr. Aristeo Rascon will move from supine to sit and sit to supine , scoot up and down and roll side to side with MINIMAL ASSIST within 3 treatment day(s). (2.) Mr. Aristeo Rascon will transfer from bed to chair and chair to bed with MODERATE ASSIST using the least restrictive device within 3 treatment day(s). (3.) Mr. Aristeo Rascon will perform standing static and dynamic balance activities x 10 minutes with MODERATE ASSISTx1 to improve safety within 3 treatment day(s). LTG:  (1.) Mr. Aristeo Rascon will move from supine to sit and sit to supine , scoot up and down and roll side to side with CONTACT GUARD ASSIST within 7 treatment day(s). (2.) Mr. Aristeo Rascon will transfer from bed to chair and chair to bed with MINIMAL ASSIST using the least restrictive device within 7 treatment day(s). (3.) Mr. Aristeo Rascon will ambulate with MODERATE ASSIST for 50+ feet with the least restrictive device within 7 treatment day(s). (4.) Mr. Aristeo Rascon will perform standing static and dynamic balance activities x 20 minutes with MINIMAL ASSIST to improve safety within 7 treatment day(s). (5.) Mr. Aristeo Rascon will perform bilateral lower extremity exercises x 15 min for HEP with SUPERVISION to improve strength, endurance, and functional mobility within 7 treatment day(s).      PHYSICAL THERAPY: Daily Note and PM 12/24/2019  INPATIENT: PT Visit Days : 5  Payor: MEDICAID PENDING / Plan: Tulio Carpio PENDING / Product Type: Medicaid /       NAME/AGE/GENDER: Joe Silveira is a 53 y.o. male   PRIMARY DIAGNOSIS: Acute ischemic stroke ([address] [I63.9]  Cerebrovascular accident (CVA) due to embolism ([address] [I63.9] Acute ischemic stroke ([address] Acute ischemic stroke ([address]       ICD-10: Treatment Diagnosis:   · Other lack of cordination (R27.8)  · Difficulty in walking, Not elsewhere classified (R26.2)  · Other abnormalities of gait and mobility (R26.89)  · Unspecified Lack of Coordination (R27.9)  · Hemiplegia and hemiparesis following cerebral infarction affecting   · left non-dominant side (A47.988)   Precaution/Allergies:  Patient has no known allergies. ASSESSMENT:     Mr. Alondra Altamirano is a 53year old M who presents to hospital with L sided facial droop and LUE and LLE weakness. MRI indicated acute infarcts in L cerebellar hemisphere, deep frontoparietal white matter and R paramedian yariel. Prior to hospital admission pt lives with his wife in a one story apartment on the second level with 12 steps to access. Pt endorses no falls in past 6 months. Prior to admission Mr. Alondra Altamirano uses no DME for mobility. He reports he had been working full time but got laid off, so now he works just a few days at [employer], which requires him being on his feet. Upon entering, pt resting in bed, wife at bedside and agreeable to therapy with note able speech impairment. He performed supine exercises below with good ability R and still flaccid on the L. He rolled to the L with assistance with his legs then sat up to the EOB Max A. Worked on scooting his R hip toward the EOB needing visual demonstration by me to understand concept of using R arm to scoot his hip forward. He worked on sitting balance needing constant verbal cues. Practiced sit to stand with hemiwalker again responding well to verbal cues for erect posture and finding balance and does not tend to figure this out on his own. We assisted him to the chair with mod A x2. Limited progress as he is getting limited therapy given his situation being stuck in the hospital with no insurance. PM: patient seen in PM to assist with a stand pivot back to bed. He stayed up for about 2 hours with wife trying to encourage him to stay up longer. He was able to do a stand pivot from the chair to the bed with chair facing bed in a way he can pull and support himself with his R hand on a railing.   He needed moderate assist x2 and this does seem to be getting a bit easier for him. He was able to bridge his hips well enough to get pads under him and positioned well. Will continue efforts    This section established at most recent assessment   PROBLEM LIST (Impairments causing functional limitations):  1. Decreased Strength  2. Decreased ADL/Functional Activities  3. Decreased Transfer Abilities  4. Decreased Ambulation Ability/Technique  5. Decreased Balance  6. Increased Pain  7. Decreased Activity Tolerance  8. Increased Fatigue  9. Decreased Flexibility/Joint Mobility   INTERVENTIONS PLANNED: (Benefits and precautions of physical therapy have been discussed with the patient.)  1. Balance Exercise  2. Bed Mobility  3. Family Education  4. Gait Training  5. Home Exercise Program (HEP)  6. Manual Therapy  7. Neuromuscular Re-education/Strengthening  8. Range of Motion (ROM)  9. Therapeutic Activites  10. Therapeutic Exercise/Strengthening  11. Transfer Training     TREATMENT PLAN: Frequency/Duration: 3 times a week for duration of hospital stay  Rehabilitation Potential For Stated Goals: [facility] (at time of discharge pending progress):    Placement: It is my opinion, based on this patient's performance to date, that Mr. Clovis Jordan may benefit from intensive therapy at an [address] [place] [address] after discharge due to a probable need for 24 hour rehab nursing, a probable need for multiple therapy disciplines, and potential to make ongoing and sustainable functional improvement that is of practical value. .  Equipment:    TBD pending progress with therapy. HISTORY:   History of Present Injury/Illness (Reason for Referral):  Per H&P: \"Pt is a 52 y/o smoker with DM, HTN, who presented to ER with L leg and arm weakness, L facial droop, dysarthria. First noted L leg weakness late night 12/11 when he woke up to go to the bathroom. He was normal when he went to bed around 1030.   Woke up this morning and had persistent weakness L leg and also now noted in L arm. EMS called. Noted to have slurred speech and L facial droop as well. Code S called in ER around 9am.  MRI with acute infarcts in L cerebellar hemisphere, deep frontoparietal white matter, and R paramedian yariel. Also noted old lacunar infarcts. No large vessel occlusion on CTA, but some stenosis noted. No hx afib, TIA, CVA. No CP, palpitations, SOB. \"  Past Medical History/Comorbidities:   Mr. Kyung Cisse  has a past medical history of Acute ischemic stroke ([address] (12/12/2019), Acute pancreatitis (11/19/2014), Cerebrovascular accident (CVA) due to embolism ([address] (12/12/2019), Diabetes ([address] (2002), Diabetes ([address] Diabetes mellitus, and Hypertension. He also has no past medical history of Arthritis, Asthma, Autoimmune disease ([address] CAD (coronary artery disease), Cancer ([address] Chronic kidney disease, COPD, Dementia, Dementia ([address] Heart failure ([address] Ill-defined condition, Infectious disease, Liver disease, Other ill-defined conditions(799.89), Psychiatric disorder, PUD (peptic ulcer disease), Seizures ([address] or Sleep disorder. Mr. Kyung Cisse  has a past surgical history that includes hx hernia repair and hx orthopaedic. Social History/Living Environment:   Home Environment: Apartment  # Steps to Enter: 12  Rails to Enter: Yes  [employer] : Bilateral  One/Two Story Residence: One story  Living Alone: No  Support Systems: Spouse/Significant Other/Partner  Patient Expects to be Discharged to[de-identified] Unknown  Current DME Used/Available at Home: None  Tub or Shower Type: Tub/Shower combination  Prior Level of Function/Work/Activity:  Independent, lives with wife in 2nd story 1 level apartment. No recent falls.      Number of Personal Factors/Comorbidities that affect the Plan of Care: 1-2: MODERATE COMPLEXITY   EXAMINATION:   Most Recent Physical Functioning:   Gross Assessment:                  Posture:     Balance:  Standing - Static: Poor  Standing - Dynamic : Poor Bed Mobility:  Rolling: Minimum assistance; Moderate assistance  Scooting: Maximum assistance  Wheelchair Mobility:     Transfers:  Sit to Stand: Moderate assistance;Maximum assistance;Assist x2  Stand to Sit: Moderate assistance;Assist x2  Bed to Chair: Moderate assistance;Assist x2  Gait:            Body Structures Involved:  1. Nerves  2. Voice/Speech  3. Bones  4. Joints  5. Muscles Body Functions Affected:  1. Mental  2. Sensory/Pain  3. Neuromusculoskeletal  4. Movement Related Activities and Participation Affected:  1. General Tasks and Demands  2. Mobility  3. Self Care  4. Interpersonal Interactions and Relationships   Number of elements that affect the Plan of Care: 4+: HIGH COMPLEXITY   CLINICAL PRESENTATION:   Presentation: Evolving clinical presentation with changing clinical characteristics: MODERATE COMPLEXITY   CLINICAL DECISION MAKIN [facility] Inpatient Short Form  How much difficulty does the patient currently have. .. Unable A Lot A Little None   1. Turning over in bed (including adjusting bedclothes, sheets and blankets)? [] 1   [] 2   [x] 3   [] 4   2. Sitting down on and standing up from a chair with arms ( e.g., wheelchair, bedside commode, etc.)   [] 1   [x] 2   [] 3   [] 4   3. Moving from lying on back to sitting on the side of the bed? [] 1   [x] 2   [] 3   [] 4   How much help from another person does the patient currently need. .. Total A Lot A Little None   4. Moving to and from a bed to a chair (including a wheelchair)? [] 1   [x] 2   [] 3   [] 4   5. Need to walk in hospital room? [] 1   [x] 2   [] 3   [] 4   6. Climbing 3-5 steps with a railing? [] 1   [x] 2   [] 3   [] 4   © , Trustees of [address] [place] [address] [address] [address], under license to [employer].  All rights reserved      Score:  Initial: 13 Most Recent: X (Date: -- )    Interpretation of Tool:  Represents activities that are increasingly more difficult (i.e. Bed mobility, Transfers, Gait). Medical Necessity:     · Patient is expected to demonstrate progress in   · strength, range of motion, balance, coordination, and functional technique  ·  to   · increase independence with all mobility. · .  Reason for Services/Other Comments:  · Patient continues to require skilled intervention due to   · medical complications and mobility deficits which impact his level of function, safety, and independence as indicated above. · .   Use of outcome tool(s) and clinical judgement create a POC that gives a: Questionable prediction of patient's progress: MODERATE COMPLEXITY        TREATMENT:   (In addition to Assessment/Re-Assessment sessions the following treatments were rendered)   Pre-treatment Symptoms/Complaints:  \"ok\"  Pain: Initial:   Pain Intensity 1: 0  Post Session:  No pain reported at end of session, resting comfortably in bed. Therapeutic Activity: (    15 Minutes): Therapeutic activities including bed mobility, rolling, scooting, sit <> stand transfer training  And stand pivot to the bed to improve mobility, strength, balance, and coordination. Required maximal cues with moderate physical assist   to promote coordination of bilateral, upper extremity(s), lower extremity(s) and promote motor control of bilateral, upper extremity(s), lower extremity(s). Date:  12/24/19 Date:   Date:     Activity/Exercise Parameters Parameters Parameters   Supine heel slides 10x B   PROM L     Supine SLR 10x R     Supine bridging 10x holding L knee bent     Supine LTR 10x ea direction                              Braces/Orthotics/Lines/Etc:   · O2 Device: Room Air   Treatment/Session Assessment:    · Response to Treatment:  see above. Works very hard with therapy.   · Interdisciplinary Collaboration:   o Physical Therapy Assistant  o Registered Nurse  o Rehabilitation Attendant  · After treatment position/precautions:   o Supine in bed  o Bed/Chair-wheels locked  o Bed in low position  o Call light within reach  o RN notified  o Family at bedside   · Compliance with Program/Exercises: Compliant all of the time  · Recommendations/Intent for next treatment session: \"Next visit will focus on advancements to more challenging activities and reduction in assistance provided\".     Total Treatment Duration:  PT Patient Time In/Time Out  Time In: 1330  Time Out: 1345    Montez Sena, PTA

## 2019-12-24 NOTE — PROGRESS NOTES
Hospitalist Note     Admit Date:  2019  9:07 AM   Name:  Cristina Enriquez   Age:  53 y.o.  :  1975   MRN:  [id number]   PCP:  Benjamin Montenegro MD      HPI/Subjective:      52 y/o smoker with DM, HTN, who presented to ER  with L leg and arm weakness, L facial droop, dysarthria. First noted L leg weakness late night  when he woke up to go to the bathroom. He was normal when he went to bed around 1030. Woke up and had persistent weakness L leg and also now noted in L arm. EMS called. Noted to have slurred speech and L facial droop as well. Code S called in ER around 9am.  MRI with acute infarcts in L cerebellar hemisphere, deep frontoparietal white matter, and R paramedian yariel. Also noted old lacunar infarcts. No large vessel occlusion on CTA, but some stenosis noted. No hx afib, TIA, CVA. No CP, palpitations, SOB. Interval History (): patient examined at bedside. No acute overnight events. No fevers/chills, chest pain, abdominal pain, nausea/vomiting, or diarrhea. No new acute issues when asked. Objective:     Patient Vitals for the past 24 hrs:   Temp Pulse Resp BP SpO2   19 0800 98.5 °F (36.9 °C) (!) 56 18 130/78 93 %   19 0400 97.6 °F (36.4 °C) (!) 53 17 117/83 90 %   19 0000 98.7 °F (37.1 °C) 60 18 126/73 90 %   19 2000 98.9 °F (37.2 °C) 60 19 123/70 91 %   19 1600 99.9 °F (37.7 °C) 64 18 137/83 95 %   19 1200 98.9 °F (37.2 °C) (!) 53 18 137/74 95 %     Oxygen Therapy  O2 Sat (%): 93 % (19 0800)  Pulse via Oximetry: 73 beats per minute (19 1615)    Intake/Output Summary (Last 24 hours) at 2019 1058  Last data filed at 2019 1832  Gross per 24 hour   Intake 440 ml   Output    Net 440 ml       Physical Exam:  General:    Well nourished. Alert and oriented. CV:   RRR. No murmur, rub, or gallop. Lungs:  Clear to auscultation bilaterally. No wheezing, rhonchi, or rales  Extremities: Warm and dry.   No cyanosis or edema. Neurologic: CN II-XII intact except for mild L facial droop. Sensation intact. Finger to nose intact. PERRLA. Mild slurred speech. No confusion. STR 1/5 LUE and LLE. Skin:     No rashes or jaundice. No wounds. Psych:  Normal mood and affect. I reviewed the labs, imaging, EKGs, telemetry, and other studies done this admission.   Data Review:   Recent Results (from the past 24 hour(s))   GLUCOSE, POC    Collection Time: 12/23/19 11:06 AM   Result Value Ref Range    Glucose (POC) 143 (H) 65 - 100 mg/dL   GLUCOSE, POC    Collection Time: 12/23/19  3:52 PM   Result Value Ref Range    Glucose (POC) 120 (H) 65 - 100 mg/dL   GLUCOSE, POC    Collection Time: 12/23/19  8:35 PM   Result Value Ref Range    Glucose (POC) 120 (H) 65 - 100 mg/dL   CREATININE    Collection Time: 12/24/19  5:40 AM   Result Value Ref Range    Creatinine 1.32 0.8 - 1.5 MG/DL   PLATELET COUNT    Collection Time: 12/24/19  5:40 AM   Result Value Ref Range    PLATELET 427 347 - 151 K/uL   HEMOGLOBIN    Collection Time: 12/24/19  5:40 AM   Result Value Ref Range    HGB 11.9 (L) 13.6 - 17.2 g/dL   GLUCOSE, POC    Collection Time: 12/24/19  7:12 AM   Result Value Ref Range    Glucose (POC) 124 (H) 65 - 100 mg/dL       All Micro Results     None          Current Facility-Administered Medications   Medication Dose Route Frequency    guaiFENesin (ROBITUSSIN) 100 mg/5 mL oral liquid 100 mg  100 mg Oral Q4H PRN    insulin lispro (HUMALOG) injection 4 Units  4 Units SubCUTAneous TIDAC    metoprolol tartrate (LOPRESSOR) tablet 100 mg  100 mg Oral BID    hydrALAZINE (APRESOLINE) 20 mg/mL injection 20 mg  20 mg IntraVENous Q4H PRN    atorvastatin (LIPITOR) tablet 80 mg  80 mg Oral QHS    amLODIPine (NORVASC) tablet 10 mg  10 mg Oral DAILY    cloNIDine HCl (CATAPRES) tablet 0.1 mg  0.1 mg Oral Q4H PRN    lisinopril (PRINIVIL, ZESTRIL) tablet 40 mg  40 mg Oral DAILY    sodium chloride (NS) flush 5-40 mL  5-40 mL IntraVENous Q8H    sodium chloride (NS) flush 5-40 mL  5-40 mL IntraVENous PRN    ondansetron (ZOFRAN) injection 4 mg  4 mg IntraVENous Q4H PRN    aspirin chewable tablet 81 mg  81 mg Oral DAILY    acetaminophen (TYLENOL) tablet 650 mg  650 mg Oral Q4H PRN    polyethylene glycol (MIRALAX) packet 17 g  17 g Oral DAILY PRN    enoxaparin (LOVENOX) injection 40 mg  40 mg SubCUTAneous Q24H    insulin lispro (HUMALOG) injection   SubCUTAneous AC&HS    dextrose 40% (GLUTOSE) oral gel 1 Tube  15 g Oral PRN    glucagon (GLUCAGEN) injection 1 mg  1 mg IntraMUSCular PRN    dextrose (D50W) injection syrg 12.5-25 g  25-50 mL IntraVENous PRN       Other Studies:  No results found. Results for orders placed or performed during the hospital encounter of 19   2D ECHO COMPLETE ADULT (TTE) [address] [facility]  One [address] [facility], [address] [facility]  [phone]    Transthoracic Echocardiogram  2D, M-mode, Doppler, and Color Doppler    Patient: Sam Thomas  MR #: [id number]  : 1975  Age: 53 years  Gender: Male  Study date: 12-Dec-2019  Account #: [de-identified]  Height: 66 in  Weight: 209.7 lb  BSA: 2.04 mï¾²  Status:Routine  Location: ER03  BP: 133/ 76    Allergies: NO KNOWN ALLERGENS    Sonographer:  LUZ So  Group:  [id number] Cardiology  Referring Physician:  Marli Kay. Krista Salgado MD  Reading Physician:  Cherie Woodward [address], MD [facility]    INDICATIONS: CVA    PROCEDURE: This was a routine study. A transthoracic echocardiogram was  performed. The study included complete 2D imaging, M-mode, complete spectral  Doppler, and color Doppler. Intravenous contrast (Definity) was administered. Image quality was adequate. LEFT VENTRICLE: Size was normal. Systolic function was normal. Ejection  fraction was estimated in the range of 65 % to 70 %. There were no regional  wall motion abnormalities. There was mild concentric hypertrophy.  Left  ventricular diastolic function parameters were normal. Avg E/e': 11.05.    RIGHT VENTRICLE: The size was normal. Systolic function was normal. The  tricuspid jet envelope definition was inadequate for estimation of RV   systolic  pressure. LEFT ATRIUM: Size was normal.    ATRIAL SEPTUM: Agitated saline contrast injection (bubble study) was   performed. There was a right-to-left shunt, in the baseline state. RIGHT ATRIUM: Size was normal.    SYSTEMIC VEINS: IVC: The inferior vena cava was normal in size and course. The  respirophasic change in diameter was more than 50%. AORTIC VALVE: The valve was trileaflet. Leaflets exhibited mild sclerosis. There was no evidence for stenosis. There was no insufficiency. MITRAL VALVE: Valve structure was normal. There was no evidence for stenosis. There was no regurgitation. TRICUSPID VALVE: The valve structure was normal. There was no evidence for  stenosis. There was trivial regurgitation. PULMONIC VALVE: Not well visualized. There was no evidence for stenosis. There  was no insufficiency. PERICARDIUM: There was no pericardial effusion. AORTA: The root exhibited normal size. SUMMARY:    -  Left ventricle: Systolic function was normal. Ejection fraction was  estimated in the range of 65 % to 70 %. There were no regional wall motion  abnormalities. There was mild concentric hypertrophy. -  Atrial septum: Agitated saline contrast injection (bubble study) was  performed. There was a right-to-left shunt, in the baseline state. -  Inferior vena cava, hepatic veins: The respirophasic change in diameter   was  more than 50%.     SYSTEM MEASUREMENT TABLES    2D mode  AoR Diam (2D): 2.8 cm  LA Dimension (2D): 3.5 cm  Left Atrium Systolic Volume Index; Method of Disks, Biplane; 2D mode;: 26   ml/m2  IVS/LVPW (2D): 1  IVSd (2D): 1.2 cm  LVIDd (2D): 5.2 cm  LVIDs (2D): 2.9 cm  LVOT Area (2D): 2.3 cm2  LVPWd (2D): 1.2 cm  RVIDd (2D): 2.2 cm    Tissue Doppler Imaging  LV Peak Early Thomas Tissue Rc; Lateral MA (TDI): 8.1 cm/s  LV Peak Early Thomas Tissue Rc; Medial MA (TDI): 7.6 cm/s    Unspecified Scan Mode  Peak Grad; Mean; Antegrade Flow: 14 mm[Hg]  Vmax; Antegrade Flow: 185 cm/s  LVOT Diam: 1.7 cm  MV Peak Rc/LV Peak Tissue Rc E-Wave; Lateral MA: 10.7  MV Peak Rc/LV Peak Tissue Rc E-Wave; Medial MA: 11.4    Prepared and signed by    Annamarie Fernandez. Jen Francois MD [facility]  Signed 12-Dec-2019 15:46:58             Assessment and Plan:     # Acute embolic stroke with persistent LUE/LLE, TTE showing large PFO with shunt  - supportive and medical management  - continue high intensity management   - blood sugar control, goal CBG between 140-180 while inpatient  - PT/OT/speech therapy  - needs outpatient cardiology follow-up for shunt closure  - neurology has been following  - continue with neuro checks    # HTN  - continue current regimen    F/E/N: no fluids, replete electrolytes as needed, diet per speech therapy recommendations    Ppx: Lovenox for VTE    Code Status: FULL CODE    Disposition: patient has been medically clear for discharge but is without insurance, needs rehab/placement. Case management continues to follow. Signed:   Karo Morton DO

## 2019-12-25 LAB
GLUCOSE BLD STRIP.AUTO-MCNC: 101 MG/DL (ref 65–100)
GLUCOSE BLD STRIP.AUTO-MCNC: 127 MG/DL (ref 65–100)
GLUCOSE BLD STRIP.AUTO-MCNC: 187 MG/DL (ref 65–100)
GLUCOSE BLD STRIP.AUTO-MCNC: 83 MG/DL (ref 65–100)

## 2019-12-25 PROCEDURE — 74011250637 HC RX REV CODE- 250/637: Performed by: HOSPITALIST

## 2019-12-25 PROCEDURE — 74011636637 HC RX REV CODE- 636/637: Performed by: HOSPITALIST

## 2019-12-25 PROCEDURE — 82962 GLUCOSE BLOOD TEST: CPT

## 2019-12-25 PROCEDURE — 65660000000 HC RM CCU STEPDOWN

## 2019-12-25 PROCEDURE — 74011250637 HC RX REV CODE- 250/637: Performed by: INTERNAL MEDICINE

## 2019-12-25 PROCEDURE — 74011250636 HC RX REV CODE- 250/636: Performed by: INTERNAL MEDICINE

## 2019-12-25 PROCEDURE — 65270000029 HC RM PRIVATE

## 2019-12-25 PROCEDURE — 74011636637 HC RX REV CODE- 636/637: Performed by: INTERNAL MEDICINE

## 2019-12-25 RX ORDER — PANTOPRAZOLE SODIUM 40 MG/1
40 TABLET, DELAYED RELEASE ORAL
Status: DISCONTINUED | OUTPATIENT
Start: 2019-12-25 | End: 2020-01-07

## 2019-12-25 RX ADMIN — METOPROLOL TARTRATE 100 MG: 50 TABLET, FILM COATED ORAL at 17:08

## 2019-12-25 RX ADMIN — GUAIFENESIN 100 MG: 100 SOLUTION ORAL at 22:01

## 2019-12-25 RX ADMIN — ASPIRIN 81 MG 81 MG: 81 TABLET ORAL at 07:51

## 2019-12-25 RX ADMIN — ENOXAPARIN SODIUM 40 MG: 40 INJECTION SUBCUTANEOUS at 13:35

## 2019-12-25 RX ADMIN — AMLODIPINE BESYLATE 10 MG: 10 TABLET ORAL at 07:51

## 2019-12-25 RX ADMIN — INSULIN LISPRO 4 UNITS: 100 INJECTION, SOLUTION INTRAVENOUS; SUBCUTANEOUS at 11:37

## 2019-12-25 RX ADMIN — GUAIFENESIN 100 MG: 100 SOLUTION ORAL at 07:56

## 2019-12-25 RX ADMIN — Medication 10 ML: at 13:36

## 2019-12-25 RX ADMIN — CLONIDINE HYDROCHLORIDE 0.1 MG: 0.2 TABLET ORAL at 00:40

## 2019-12-25 RX ADMIN — LISINOPRIL 40 MG: 20 TABLET ORAL at 07:51

## 2019-12-25 RX ADMIN — METOPROLOL TARTRATE 100 MG: 50 TABLET, FILM COATED ORAL at 07:51

## 2019-12-25 RX ADMIN — Medication 10 ML: at 21:52

## 2019-12-25 RX ADMIN — INSULIN LISPRO 2 UNITS: 100 INJECTION, SOLUTION INTRAVENOUS; SUBCUTANEOUS at 11:36

## 2019-12-25 RX ADMIN — ATORVASTATIN CALCIUM 80 MG: 80 TABLET, FILM COATED ORAL at 21:52

## 2019-12-25 RX ADMIN — PANTOPRAZOLE SODIUM 40 MG: 40 TABLET, DELAYED RELEASE ORAL at 11:33

## 2019-12-25 NOTE — PROGRESS NOTES
Hospitalist Note     Admit Date:  2019  9:07 AM   Name:  Jenine Oppenheim   Age:  53 y.o.  :  1975   MRN:  [id number]   PCP:  Stephanie Norris MD    Mr. Osmar Davey is a 52 y/o smoker with DM, HTN, who presented to ER  with L leg and arm weakness, L facial droop, dysarthria. First noted L leg weakness late night  when he woke up to go to the bathroom. He was normal when he went to bed around 1030. EMS called. Noted to have slurred speech and L facial droop as well. Code S called in ER around 9am.  MRI with acute infarcts in L cerebellar hemisphere, deep frontoparietal white matter, and R paramedian yariel. Also noted old lacunar infarcts. No large vessel occlusion on CTA, but some stenosis noted. He was started on asa, statins. Neurology consulted. An ECHO showed PFO. Plan for cardiology referral and evaluate for closure as outpatient. PT/OT suggested inpatient rehabilitation. The patient is uninsured and the only option at this moment is [facility]. Subjective:   He was found in no distress, eating his lunch. He feels okay, denies acute complains. Objective:     Patient Vitals for the past 24 hrs:   Temp Pulse Resp BP SpO2   19 0800 98.4 °F (36.9 °C) 71 18 109/66 97 %   19 0400 98.8 °F (37.1 °C) 64 17 110/73 93 %   19 0000 97.5 °F (36.4 °C) 66 17 (!) 162/93 97 %   19 2000 97.5 °F (36.4 °C) (!) 57 17 136/77 97 %   19 1600 98.3 °F (36.8 °C) (!) 53 18 146/76 98 %   19 1200 98.1 °F (36.7 °C) 63 18 127/68 96 %     Oxygen Therapy  O2 Sat (%): 97 % (19 0800)  Pulse via Oximetry: 73 beats per minute (19 1615)  No intake or output data in the 24 hours ending 19 3513      Physical Exam:  General:    Well nourished. Alert and oriented. CV:   RRR. No murmur, rub, or gallop. Lungs:  Clear to auscultation bilaterally. No wheezing, rhonchi, or rales  Extremities: Warm and dry. No cyanosis or edema.   Neurologic: CN II-XII intact except for mild L facial droop. Sensation intact. Finger to nose intact. PERRLA. Mild slurred speech. No confusion. STR 1/5 LUE and LLE. Skin:     No rashes or jaundice. No wounds. Psych:  Normal mood and affect. I reviewed the labs, imaging, EKGs, telemetry, and other studies done this admission.     Data Review:   Recent Results (from the past 24 hour(s))   GLUCOSE, POC    Collection Time: 12/24/19 11:40 AM   Result Value Ref Range    Glucose (POC) 166 (H) 65 - 100 mg/dL   GLUCOSE, POC    Collection Time: 12/24/19  4:26 PM   Result Value Ref Range    Glucose (POC) 158 (H) 65 - 100 mg/dL   GLUCOSE, POC    Collection Time: 12/24/19  9:24 PM   Result Value Ref Range    Glucose (POC) 121 (H) 65 - 100 mg/dL   GLUCOSE, POC    Collection Time: 12/25/19  7:06 AM   Result Value Ref Range    Glucose (POC) 127 (H) 65 - 100 mg/dL       All Micro Results     None          Current Facility-Administered Medications   Medication Dose Route Frequency    guaiFENesin (ROBITUSSIN) 100 mg/5 mL oral liquid 100 mg  100 mg Oral Q4H PRN    insulin lispro (HUMALOG) injection 4 Units  4 Units SubCUTAneous TIDAC    metoprolol tartrate (LOPRESSOR) tablet 100 mg  100 mg Oral BID    hydrALAZINE (APRESOLINE) 20 mg/mL injection 20 mg  20 mg IntraVENous Q4H PRN    atorvastatin (LIPITOR) tablet 80 mg  80 mg Oral QHS    amLODIPine (NORVASC) tablet 10 mg  10 mg Oral DAILY    cloNIDine HCl (CATAPRES) tablet 0.1 mg  0.1 mg Oral Q4H PRN    lisinopril (PRINIVIL, ZESTRIL) tablet 40 mg  40 mg Oral DAILY    sodium chloride (NS) flush 5-40 mL  5-40 mL IntraVENous Q8H    sodium chloride (NS) flush 5-40 mL  5-40 mL IntraVENous PRN    ondansetron (ZOFRAN) injection 4 mg  4 mg IntraVENous Q4H PRN    aspirin chewable tablet 81 mg  81 mg Oral DAILY    acetaminophen (TYLENOL) tablet 650 mg  650 mg Oral Q4H PRN    polyethylene glycol (MIRALAX) packet 17 g  17 g Oral DAILY PRN    enoxaparin (LOVENOX) injection 40 mg  40 mg SubCUTAneous Q24H    insulin lispro (HUMALOG) injection   SubCUTAneous AC&HS    dextrose 40% (GLUTOSE) oral gel 1 Tube  15 g Oral PRN    glucagon (GLUCAGEN) injection 1 mg  1 mg IntraMUSCular PRN    dextrose (D50W) injection syrg 12.5-25 g  25-50 mL IntraVENous PRN       Other Studies:  No results found. Results for orders placed or performed during the hospital encounter of 19   2D ECHO COMPLETE ADULT (TTE) [address] [facility] [address] [facility], [address] [facility]  [phone]    Transthoracic Echocardiogram  2D, M-mode, Doppler, and Color Doppler    Patient: Zachery Monroe  MR #: [id number]  : 1975  Age: 53 years  Gender: Male  Study date: 12-Dec-2019  Account #: [de-identified]  Height: 66 in  Weight: 209.7 lb  BSA: 2.04 mï¾²  Status:Routine  Location: ER03  BP: 133/ 76    Allergies: NO KNOWN ALLERGENS    Sonographer:  Zechariah Tapia [facility]  Group:  [id number] Cardiology  Referring Physician:  Tarik Sanchez. Srinivasa Thomas MD  Reading Physician:  Jeff Shah MD [facility]    INDICATIONS: CVA    PROCEDURE: This was a routine study. A transthoracic echocardiogram was  performed. The study included complete 2D imaging, M-mode, complete spectral  Doppler, and color Doppler. Intravenous contrast (Definity) was administered. Image quality was adequate. LEFT VENTRICLE: Size was normal. Systolic function was normal. Ejection  fraction was estimated in the range of 65 % to 70 %. There were no regional  wall motion abnormalities. There was mild concentric hypertrophy. Left  ventricular diastolic function parameters were normal. Avg E/e': 11.05.    RIGHT VENTRICLE: The size was normal. Systolic function was normal. The  tricuspid jet envelope definition was inadequate for estimation of RV   systolic  pressure. LEFT ATRIUM: Size was normal.    ATRIAL SEPTUM: Agitated saline contrast injection (bubble study) was   performed.   There was a right-to-left shunt, in the baseline state.    RIGHT ATRIUM: Size was normal.    SYSTEMIC VEINS: IVC: The inferior vena cava was normal in size and course. The  respirophasic change in diameter was more than 50%. AORTIC VALVE: The valve was trileaflet. Leaflets exhibited mild sclerosis. There was no evidence for stenosis. There was no insufficiency. MITRAL VALVE: Valve structure was normal. There was no evidence for stenosis. There was no regurgitation. TRICUSPID VALVE: The valve structure was normal. There was no evidence for  stenosis. There was trivial regurgitation. PULMONIC VALVE: Not well visualized. There was no evidence for stenosis. There  was no insufficiency. PERICARDIUM: There was no pericardial effusion. AORTA: The root exhibited normal size. SUMMARY:    -  Left ventricle: Systolic function was normal. Ejection fraction was  estimated in the range of 65 % to 70 %. There were no regional wall motion  abnormalities. There was mild concentric hypertrophy. -  Atrial septum: Agitated saline contrast injection (bubble study) was  performed. There was a right-to-left shunt, in the baseline state. -  Inferior vena cava, hepatic veins: The respirophasic change in diameter   was  more than 50%. SYSTEM MEASUREMENT TABLES    2D mode  AoR Diam (2D): 2.8 cm  LA Dimension (2D): 3.5 cm  Left Atrium Systolic Volume Index; Method of Disks, Biplane; 2D mode;: 26   ml/m2  IVS/LVPW (2D): 1  IVSd (2D): 1.2 cm  LVIDd (2D): 5.2 cm  LVIDs (2D): 2.9 cm  LVOT Area (2D): 2.3 cm2  LVPWd (2D): 1.2 cm  RVIDd (2D): 2.2 cm    Tissue Doppler Imaging  LV Peak Early Thomas Tissue Rc; Lateral MA (TDI): 8.1 cm/s  LV Peak Early Thomas Tissue Rc; Medial MA (TDI): 7.6 cm/s    Unspecified Scan Mode  Peak Grad; Mean; Antegrade Flow: 14 mm[Hg]  Vmax; Antegrade Flow: 185 cm/s  LVOT Diam: 1.7 cm  MV Peak Rc/LV Peak Tissue Rc E-Wave; Lateral MA: 10.7  MV Peak Rc/LV Peak Tissue Rc E-Wave; Medial MA: 11.4    Prepared and signed by    Sabi Muller.  Dewey MD [facility]  Signed 12-Dec-2019 15:46:58             Assessment and Plan:     - Acute embolic stroke with persistent LUE/LLE, TTE showing large PFO with shunt:   continue high intensity management, aspirin   blood sugar control  PT/OT/speech therapy  needs outpatient cardiology follow-up for shunt closure  neurology has been following    -HTN: continue current regimen    -Type 2 DM:  humalog ISS    Ppx: Lovenox for VTE    Code Status: FULL CODE    Disposition: patient has been medically clear for discharge but is without insurance, needs rehab/placement. Only option is [facility].  CM on board     Signed:  Terese Rhodes MD

## 2019-12-25 NOTE — PROGRESS NOTES
Problem: Patient Education: Go to Patient Education Activity  Goal: Patient/Family Education  Outcome: Progressing Towards Goal     Problem: Pressure Injury - Risk of  Goal: *Prevention of pressure injury  Description  Document Dewey Scale and appropriate interventions in the flowsheet. Outcome: Progressing Towards Goal  Note: Pressure Injury Interventions:  Sensory Interventions: Assess changes in LOC, Check visual cues for pain, Float heels    Moisture Interventions: Absorbent underpads, Apply protective barrier, creams and emollients, Check for incontinence Q2 hours and as needed    Activity Interventions: Increase time out of bed, PT/OT evaluation    Mobility Interventions: Float heels, PT/OT evaluation, Suspension boots    Nutrition Interventions: Document food/fluid/supplement intake, Discuss nutritional consult with provider, Offer support with meals,snacks and hydration    Friction and Shear Interventions: Apply protective barrier, creams and emollients, Foam dressings/transparent film/skin sealants, HOB 30 degrees or less                Problem: Patient Education: Go to Patient Education Activity  Goal: Patient/Family Education  Outcome: Progressing Towards Goal     Problem: Falls - Risk of  Goal: *Absence of Falls  Description  Document Jeanne Fall Risk and appropriate interventions in the flowsheet.   Outcome: Progressing Towards Goal  Note: Fall Risk Interventions:  Mobility Interventions: Bed/chair exit alarm, OT consult for ADLs, Patient to call before getting OOB, PT Consult for mobility concerns    Mentation Interventions: Bed/chair exit alarm, Door open when patient unattended    Medication Interventions: Bed/chair exit alarm, Patient to call before getting OOB, Teach patient to arise slowly    Elimination Interventions: Bed/chair exit alarm, Call light in reach, Patient to call for help with toileting needs, Toileting schedule/hourly rounds    History of Falls Interventions: Bed/chair exit alarm, Consult care management for discharge planning, Door open when patient unattended         Problem: Patient Education: Go to Patient Education Activity  Goal: Patient/Family Education  Outcome: Progressing Towards Goal     Problem: Diabetes Self-Management  Goal: *Disease process and treatment process  Description  Define diabetes and identify own type of diabetes; list 3 options for treating diabetes. Outcome: Progressing Towards Goal  Goal: *Incorporating nutritional management into lifestyle  Description  Describe effect of type, amount and timing of food on blood glucose; list 3 methods for planning meals. Outcome: Progressing Towards Goal  Goal: *Incorporating physical activity into lifestyle  Description  State effect of exercise on blood glucose levels. Outcome: Progressing Towards Goal  Goal: *Developing strategies to promote health/change behavior  Description  Define the ABC's of diabetes; identify appropriate screenings, schedule and personal plan for screenings. Outcome: Progressing Towards Goal  Goal: *Using medications safely  Description  State effect of diabetes medications on diabetes; name diabetes medication taking, action and side effects. Outcome: Progressing Towards Goal  Goal: *Monitoring blood glucose, interpreting and using results  Description  Identify recommended blood glucose targets  and personal targets. Outcome: Progressing Towards Goal  Goal: *Prevention, detection, treatment of acute complications  Description  List symptoms of hyper- and hypoglycemia; describe how to treat low blood sugar and actions for lowering  high blood glucose level. Outcome: Progressing Towards Goal  Goal: *Prevention, detection and treatment of chronic complications  Description  Define the natural course of diabetes and describe the relationship of blood glucose levels to long term complications of diabetes.   Outcome: Progressing Towards Goal  Goal: *Developing strategies to address psychosocial issues  Description  Describe feelings about living with diabetes; identify support needed and support network  Outcome: Progressing Towards Goal     Problem: Patient Education: Go to Patient Education Activity  Goal: Patient/Family Education  Outcome: Progressing Towards Goal     Problem: Patient Education: Go to Patient Education Activity  Goal: Patient/Family Education  Outcome: Progressing Towards Goal     Problem: Nutrition Deficit  Goal: *Optimize nutritional status  Outcome: Progressing Towards Goal     Problem: Patient Education: Go to Patient Education Activity  Goal: Patient/Family Education  Outcome: Progressing Towards Goal     Problem: General Medical Care Plan  Goal: *Vital signs within specified parameters  Outcome: Progressing Towards Goal  Goal: *Labs within defined limits  Outcome: Progressing Towards Goal  Goal: *Absence of infection signs and symptoms  Description  Wash hand more often   Outcome: Progressing Towards Goal  Goal: *Optimal pain control at patient's stated goal  Outcome: Progressing Towards Goal  Goal: *Skin integrity maintained  Outcome: Progressing Towards Goal  Goal: *Fluid volume balance  Outcome: Progressing Towards Goal  Goal: *Optimize nutritional status  Outcome: Progressing Towards Goal  Goal: *Anxiety reduced or absent  Outcome: Progressing Towards Goal  Goal: *Progressive mobility and function (eg: ADL's)  Outcome: Progressing Towards Goal     Problem: Patient Education: Go to Patient Education Activity  Goal: Patient/Family Education  Outcome: Progressing Towards Goal     Problem: Patient Education: Go to Patient Education Activity  Goal: Patient/Family Education  Outcome: Progressing Towards Goal     Problem: Patient Education: Go to Patient Education Activity  Goal: Patient/Family Education  Outcome: Progressing Towards Goal     Problem: Patient Education: Go to Patient Education Activity  Goal: Patient/Family Education  Outcome: Progressing Towards Goal Problem: TIA/CVA Stroke: Day 2 Until Discharge  Goal: Off Pathway (Use only if patient is Off Pathway)  Outcome: Progressing Towards Goal  Goal: Activity/Safety  Outcome: Progressing Towards Goal  Goal: Diagnostic Test/Procedures  Outcome: Progressing Towards Goal  Goal: Nutrition/Diet  Outcome: Progressing Towards Goal  Goal: Discharge Planning  Outcome: Progressing Towards Goal  Goal: Medications  Outcome: Progressing Towards Goal  Goal: Respiratory  Outcome: Progressing Towards Goal  Goal: Treatments/Interventions/Procedures  Outcome: Progressing Towards Goal  Goal: Psychosocial  Outcome: Progressing Towards Goal  Goal: *Verbalizes anxiety and depression are reduced or absent  Outcome: Progressing Towards Goal  Goal: *Absence of aspiration  Outcome: Progressing Towards Goal  Goal: *Absence of deep venous thrombosis signs and symptoms(Stroke Metric)  Outcome: Progressing Towards Goal  Goal: *Optimal pain control at patient's stated goal  Outcome: Progressing Towards Goal  Goal: *Tolerating diet  Outcome: Progressing Towards Goal  Goal: *Ability to perform ADLs and demonstrates progressive mobility and function  Outcome: Progressing Towards Goal  Goal: *Stroke education continued(Stroke Metric)  Outcome: Progressing Towards Goal     Problem: Ischemic Stroke: Discharge Outcomes  Goal: *Verbalizes anxiety and depression are reduced or absent  Outcome: Progressing Towards Goal  Goal: *Verbalize understanding of risk factor modification(Stroke Metric)  Outcome: Progressing Towards Goal  Goal: *Hemodynamically stable  Outcome: Progressing Towards Goal  Goal: *Absence of aspiration pneumonia  Outcome: Progressing Towards Goal  Goal: *Aware of needed dietary changes  Outcome: Progressing Towards Goal  Goal: *Verbalize understanding of prescribed medications including anti-coagulants, anti-lipid, and/or anti-platelets(Stroke Metric)  Outcome: Progressing Towards Goal  Goal: *Tolerating diet  Outcome: Progressing Towards Goal  Goal: *Aware of follow-up diagnostics related to anticoagulants  Outcome: Progressing Towards Goal  Goal: *Ability to perform ADLs and demonstrates progressive mobility and function  Outcome: Progressing Towards Goal  Goal: *Absence of DVT(Stroke Metric)  Outcome: Progressing Towards Goal  Goal: *Absence of aspiration  Outcome: Progressing Towards Goal  Goal: *Optimal pain control at patient's stated goal  Outcome: Progressing Towards Goal  Goal: *Home safety concerns addressed  Outcome: Progressing Towards Goal  Goal: *Describes available resources and support systems  Outcome: Progressing Towards Goal  Goal: *Verbalizes understanding of activation of EMS(911) for stroke symptoms(Stroke Metric)  Outcome: Progressing Towards Goal  Goal: *Understands and describes signs and symptoms to report to providers(Stroke Metric)  Outcome: Progressing Towards Goal  Goal: *Neurolgocially stable (absence of additional neurological deficits)  Outcome: Progressing Towards Goal  Goal: *Verbalizes importance of follow-up with primary care physician(Stroke Metric)  Outcome: Progressing Towards Goal  Goal: *Smoking cessation discussed,if applicable(Stroke Metric)  Outcome: Progressing Towards Goal  Goal: *Depression screening completed(Stroke Metric)  Outcome: Progressing Towards Goal

## 2019-12-25 NOTE — PROGRESS NOTES
12/24/19 2233   NIH Stroke Scale   Interval Other (comment)  (Neuro checks)   LOC 0   LOC Questions 0   LOC Commands 0   Motor Right Arm 0   Motor Left Arm 4   Motor Right Leg 0   Motor Left Leg 4   Dysarthria 1     Neuro checks

## 2019-12-25 NOTE — PROGRESS NOTES
12/25/19 0400   NIH Stroke Scale   Interval Other (comment)  (Neuro checks)   LOC 0   LOC Questions 0   LOC Commands 0   Motor Right Arm 0   Motor Left Arm 4   Motor Right Leg 0   Motor Left Leg 4   Dysarthria 1     Neuro checks

## 2019-12-25 NOTE — PROGRESS NOTES
12/25/19 0000   NIH Stroke Scale   Interval Other (comment)  (Neuro checks)   LOC 0   LOC Questions 0   LOC Commands 0   Motor Right Arm 0   Motor Left Arm 4   Motor Right Leg 0   Motor Left Leg 4   Dysarthria 1     Neuro checks

## 2019-12-25 NOTE — PROGRESS NOTES
12/24/19 1910   NIH Stroke Scale   Interval Other (comment)  (Dual NIH)   LOC 0   LOC Questions 0   LOC Commands 0   Best Gaze 0   Visual 0   Facial Palsy 2   Motor Right Arm 0   Motor Left Arm 4   Motor Right Leg 0   Motor Left Leg 4   Limb Ataxia 0   Sensory 0   Best Language 0   Dysarthria 1   Extinction and Inattention 0   Total 11

## 2019-12-25 NOTE — PROGRESS NOTES
12/25/19 0700   NIH Stroke Scale   Interval Other (comment)  (Dual NIH with Ethel RN)   LOC 0   LOC Questions 0   LOC Commands 0   Best Gaze 0   Visual 0   Facial Palsy 2   Motor Right Arm 0   Motor Left Arm 4   Motor Right Leg 0   Motor Left Leg 4   Limb Ataxia 0   Sensory 0   Best Language 0   Dysarthria 1   Extinction and Inattention 0   Total 11     Dual NIH with Ethel RN    Ischemic Stroke without Activase/TIA     VTE Prophylaxis: Yes: Lovenox     Antiplatelet: Yes: aspirin     Statin if LDL Greater Than or Equal to100: Yes: Lipitor     BP Parameters: Less Than 160 SBP     Controlled With: Scheduled PO, PRN - IV and PRN - PO     Dysphagia Screen Completed: Yes: Fail  Dysphagia Screening  Vocal Quality/Secretions: (!) Abnormal  History of Dysphagia: No  O2 Saturation: Normal  Alertness: Normal  Pre-Swallow Assessment Score: 1   Cleared by Speech for Mechanical Soft, Thin Liquids, Pills whole in applesauce     Patient has PEG, NG Tube, Feeding Tube: No     Medication orders per above route: Yes     Nutrition Status: PO     NIH Stroke Scale Complete: Yes: 11     Frequency of Vital Signs: Every 4 hours     Frequency of Neuro Checks: Every 4 hours     Daily Education/Care Plan Updated: Yes      Bedside report to NERITES

## 2019-12-26 LAB
GLUCOSE BLD STRIP.AUTO-MCNC: 117 MG/DL (ref 65–100)
GLUCOSE BLD STRIP.AUTO-MCNC: 120 MG/DL (ref 65–100)
GLUCOSE BLD STRIP.AUTO-MCNC: 125 MG/DL (ref 65–100)
GLUCOSE BLD STRIP.AUTO-MCNC: 126 MG/DL (ref 65–100)

## 2019-12-26 PROCEDURE — 92507 TX SP LANG VOICE COMM INDIV: CPT

## 2019-12-26 PROCEDURE — 82962 GLUCOSE BLOOD TEST: CPT

## 2019-12-26 PROCEDURE — 74011250637 HC RX REV CODE- 250/637: Performed by: INTERNAL MEDICINE

## 2019-12-26 PROCEDURE — 65660000000 HC RM CCU STEPDOWN

## 2019-12-26 PROCEDURE — 74011636637 HC RX REV CODE- 636/637: Performed by: HOSPITALIST

## 2019-12-26 PROCEDURE — 74011250636 HC RX REV CODE- 250/636: Performed by: INTERNAL MEDICINE

## 2019-12-26 PROCEDURE — 74011250637 HC RX REV CODE- 250/637: Performed by: HOSPITALIST

## 2019-12-26 PROCEDURE — 65270000029 HC RM PRIVATE

## 2019-12-26 PROCEDURE — 74011636637 HC RX REV CODE- 636/637: Performed by: INTERNAL MEDICINE

## 2019-12-26 RX ORDER — INSULIN GLARGINE 100 [IU]/ML
10 INJECTION, SOLUTION SUBCUTANEOUS
Status: DISCONTINUED | OUTPATIENT
Start: 2019-12-26 | End: 2020-01-15

## 2019-12-26 RX ADMIN — GUAIFENESIN 100 MG: 100 SOLUTION ORAL at 21:22

## 2019-12-26 RX ADMIN — AMLODIPINE BESYLATE 10 MG: 10 TABLET ORAL at 08:00

## 2019-12-26 RX ADMIN — ATORVASTATIN CALCIUM 80 MG: 80 TABLET, FILM COATED ORAL at 21:22

## 2019-12-26 RX ADMIN — PANTOPRAZOLE SODIUM 40 MG: 40 TABLET, DELAYED RELEASE ORAL at 05:59

## 2019-12-26 RX ADMIN — METOPROLOL TARTRATE 100 MG: 50 TABLET, FILM COATED ORAL at 08:00

## 2019-12-26 RX ADMIN — ENOXAPARIN SODIUM 40 MG: 40 INJECTION SUBCUTANEOUS at 14:08

## 2019-12-26 RX ADMIN — Medication 10 ML: at 21:22

## 2019-12-26 RX ADMIN — INSULIN LISPRO 4 UNITS: 100 INJECTION, SOLUTION INTRAVENOUS; SUBCUTANEOUS at 08:00

## 2019-12-26 RX ADMIN — Medication 10 ML: at 14:08

## 2019-12-26 RX ADMIN — METOPROLOL TARTRATE 100 MG: 50 TABLET, FILM COATED ORAL at 17:12

## 2019-12-26 RX ADMIN — Medication 5 ML: at 06:00

## 2019-12-26 RX ADMIN — LISINOPRIL 40 MG: 20 TABLET ORAL at 08:00

## 2019-12-26 RX ADMIN — ASPIRIN 81 MG 81 MG: 81 TABLET ORAL at 08:00

## 2019-12-26 RX ADMIN — INSULIN GLARGINE 10 UNITS: 100 INJECTION, SOLUTION SUBCUTANEOUS at 21:25

## 2019-12-26 NOTE — PROGRESS NOTES
12/25/19 1912   NIH Stroke Scale   Interval   (dual with Dolores RN)   LOC 0   LOC Questions 0   LOC Commands 0   Best Gaze 0   Visual 0   Facial Palsy 2   Motor Right Arm 0   Motor Left Arm 4   Motor Right Leg 0   Motor Left Leg 4   Limb Ataxia 0   Sensory 0   Best Language 0   Dysarthria 1   Extinction and Inattention 0   Total 11

## 2019-12-26 NOTE — PROGRESS NOTES
Problem: Pressure Injury - Risk of  Goal: *Prevention of pressure injury  Description  Document Dewey Scale and appropriate interventions in the flowsheet. Outcome: Progressing Towards Goal  Note: Pressure Injury Interventions:  Sensory Interventions: Assess changes in LOC, Keep linens dry and wrinkle-free, Minimize linen layers, Pressure redistribution bed/mattress (bed type)    Moisture Interventions: Absorbent underpads, Limit adult briefs, Minimize layers    Activity Interventions: Increase time out of bed, Pressure redistribution bed/mattress(bed type)    Mobility Interventions: HOB 30 degrees or less, Pressure redistribution bed/mattress (bed type)    Nutrition Interventions: Offer support with meals,snacks and hydration, Document food/fluid/supplement intake    Friction and Shear Interventions: Apply protective barrier, creams and emollients, Foam dressings/transparent film/skin sealants, Lift sheet, Minimize layers                Problem: Patient Education: Go to Patient Education Activity  Goal: Patient/Family Education  Outcome: Progressing Towards Goal     Problem: Falls - Risk of  Goal: *Absence of Falls  Description  Document Jeanne Fall Risk and appropriate interventions in the flowsheet.   Outcome: Progressing Towards Goal  Note: Fall Risk Interventions:  Mobility Interventions: Bed/chair exit alarm, OT consult for ADLs, Patient to call before getting OOB, PT Consult for mobility concerns    Mentation Interventions: Bed/chair exit alarm, Door open when patient unattended    Medication Interventions: Bed/chair exit alarm, Patient to call before getting OOB, Teach patient to arise slowly    Elimination Interventions: Bed/chair exit alarm, Call light in reach, Patient to call for help with toileting needs, Toileting schedule/hourly rounds    History of Falls Interventions: Bed/chair exit alarm, Consult care management for discharge planning, Door open when patient unattended         Problem: Patient Education: Go to Patient Education Activity  Goal: Patient/Family Education  Outcome: Progressing Towards Goal     Problem: Diabetes Self-Management  Goal: *Disease process and treatment process  Description  Define diabetes and identify own type of diabetes; list 3 options for treating diabetes. Outcome: Progressing Towards Goal  Goal: *Incorporating nutritional management into lifestyle  Description  Describe effect of type, amount and timing of food on blood glucose; list 3 methods for planning meals. Outcome: Progressing Towards Goal  Goal: *Incorporating physical activity into lifestyle  Description  State effect of exercise on blood glucose levels. Outcome: Progressing Towards Goal  Goal: *Developing strategies to promote health/change behavior  Description  Define the ABC's of diabetes; identify appropriate screenings, schedule and personal plan for screenings. Outcome: Progressing Towards Goal  Goal: *Using medications safely  Description  State effect of diabetes medications on diabetes; name diabetes medication taking, action and side effects. Outcome: Progressing Towards Goal  Goal: *Monitoring blood glucose, interpreting and using results  Description  Identify recommended blood glucose targets  and personal targets. Outcome: Progressing Towards Goal  Goal: *Prevention, detection, treatment of acute complications  Description  List symptoms of hyper- and hypoglycemia; describe how to treat low blood sugar and actions for lowering  high blood glucose level. Outcome: Progressing Towards Goal  Goal: *Prevention, detection and treatment of chronic complications  Description  Define the natural course of diabetes and describe the relationship of blood glucose levels to long term complications of diabetes.   Outcome: Progressing Towards Goal  Goal: *Developing strategies to address psychosocial issues  Description  Describe feelings about living with diabetes; identify support needed and support network  Outcome: Progressing Towards Goal     Problem: Patient Education: Go to Patient Education Activity  Goal: Patient/Family Education  Outcome: Progressing Towards Goal     Problem: TIA/CVA Stroke: Day 2 Until Discharge  Goal: Activity/Safety  Outcome: Progressing Towards Goal  Goal: Diagnostic Test/Procedures  Outcome: Progressing Towards Goal  Goal: Nutrition/Diet  Outcome: Progressing Towards Goal  Goal: Discharge Planning  Outcome: Progressing Towards Goal  Goal: Medications  Outcome: Progressing Towards Goal  Goal: Respiratory  Outcome: Progressing Towards Goal  Goal: Treatments/Interventions/Procedures  Outcome: Progressing Towards Goal  Goal: Psychosocial  Outcome: Progressing Towards Goal  Goal: *Verbalizes anxiety and depression are reduced or absent  Outcome: Progressing Towards Goal  Goal: *Absence of aspiration  Outcome: Progressing Towards Goal  Goal: *Absence of deep venous thrombosis signs and symptoms(Stroke Metric)  Outcome: Progressing Towards Goal  Goal: *Optimal pain control at patient's stated goal  Outcome: Progressing Towards Goal  Goal: *Tolerating diet  Outcome: Progressing Towards Goal  Goal: *Ability to perform ADLs and demonstrates progressive mobility and function  Outcome: Progressing Towards Goal  Goal: *Stroke education continued(Stroke Metric)  Outcome: Progressing Towards Goal     Problem: Pain  Goal: *Control of Pain  Outcome: Progressing Towards Goal     Problem: Patient Education: Go to Patient Education Activity  Goal: Patient/Family Education  Outcome: Progressing Towards Goal

## 2019-12-26 NOTE — PROGRESS NOTES
Letter drafted for pt's spouse to take to the landlord for confirmation that the pt can not scale stairs and will need a first floor apartment at discharge. Letter placed in pt's file at the nursing station for the spouse to  at her earliest convenience.

## 2019-12-26 NOTE — PROGRESS NOTES
Neurolinguistic Goals:  LTG: Patient will increase neuro-linguistic abilities to increase safety and awareness in functional living environment   STG: Patient will participate in speech/language/cognitive evaluation.  MET 12/17/19  STG: Patient will solve mathematical problems with 80%accuracy given minimal cueing   STG: Patient will name 10 items to concrete category in 1 minute given minimal cueing  STG: Patient will participate in verbal reasoning tasks with 80% accuracy given minimal cueing  STG: Patient will complete mental manipulation tasks with 80% accuracy given minimal cueing  STG: Patient will complete working memory/attention tasks with 80% accuracy given minimal cueing  STG: Patient will recall relevant verbal information with 80% accuracy with minimal assistance  STG: Patient will utilize memory compensatory strategies to improve recall of functional list/message with 90%accuracy given minimal assistance  STG: Patient will participate in ongoing cognitive linguistic evaluation for therapeutic assessment      Dysarthria Goals:  LTG: Patient will develop functional and intelligible speech and utilize compensatory strategies to improve communication across environments  STG: Patient will fill in carrier phrase/complete automatic speech tasks with 95% accuracy given minimal cueing  STG: Patient will repeat phrases, sentences with 85% accuracy given minimal cueing  STG: Patient will utilize compensatory strategies across tasks with 90% accuracy given minimal cueing    SPEECH LANGUAGE PATHOLOGY: SPEECH-LANGUAGE/COGNITION: Daily Note 3    NAME/AGE/GENDER: Britney Burgess is a 53 y.o. male  DATE: 12/26/2019  PRIMARY DIAGNOSIS: Acute ischemic stroke ([facility] [address] [I63.9]  Cerebrovascular accident (CVA) due to embolism ([facility] [address] [I63.9]      ICD-10: Treatment Diagnosis: R47.1 Dysarthria and Anarthria  R41.841 Cognitive-Communication Deficit    INTERDISCIPLINARY COLLABORATION: Registered Nurse  PRECAUTIONS/ALLERGIES: Patient has no known allergies. SUBJECTIVE   Alert upright in bed for session. Wife and son at bedside. Problem List:  (Impairments causing functional limitations):  1. Cognitive linguistic deficits  2. Dysarthria     Orientation:   Person  Place  Time  Situation     Pain: Pain Scale 1: Numeric (0 - 10)  Pain Intensity 1: 0     OBJECTIVE   The following treatment tasks were completed:   Functional memory: recalled 0/3 compensatory strategies for dysarthria- educated patient and family on compensatory strategies for improving speech intelligibility. Patient attempted to utilize these strategies during structured tasks and conversation given mod-max verbal cues and model. Visual memory: patient studied picture scene for 1 minute then blindly answered questions about details from picture scene with 7/10 accurate  Auditory memory/paragraph recall: answered open ended questions about 5 sentence factual paragraph presented verbally with 2/5 accurate. Accuracy did not improve given multiple choice options. Reasoning/word deduction: 3/5 accurate given mod verbal cues       ASSESSMENT   Patient continues to present with moderate cognitive linguistic impairment and moderate dysarthria. Poor carryover of compensatory strategies for improving speech intelligibility in structured tasks and in conversation. Reduced short term visual and auditory memory. Patient also demonstrated significant difficulty completed deductive reasoning task. Verbal cues effective with improving accuracy. Cognitive impairments and dysarthria continue to reduced patient's independence with ADLs and decrease patient's functional communication. Patient will benefit from ongoing skilled intervention to improve speech intelligibility and cognitive linguistic skills.         Tool Used: MODIFIED BRITT SCALE (mRS)   Score   No Symptoms  [] 0   No significant disability despite symptoms; able to carry out all usual duties and activities  [] 1 Slight disability; unable to carry out all previous activities but able to look after own affairs without assistance. [] 2   Moderate disability; requiring some help but able to walk without assistance  [] 3   Moderately severe disability; unable to walk without assistance and unable to attend to own bodily needs without assistance  [] 4   Severe disability; bedridden, incontinent, and requiring constant nursing care and attention  [] 5      Score:  Initial: 2 Most Recent: 2  (Date 12/26/19 )   Interpretation of Tool: The Modified Marty Scale is a 7-point scaled used to quantify level of disability as it relates to a patient's functional abilities. PLAN    FREQUENCY/DURATION: Continue to follow patient 3 times a week for duration of hospital stay to address above goals. - Recommendations for next treatment session: Next treatment will address cognitive linguistic and dysarthria treatment   REHABILITATION POTENTIAL FOR STATED GOALS: Excellent           RECOMMENDATIONS   STRATEGIES:   Dysarthria strategies  Memory strategies      EDUCATION:  · Recommendations discussed with Nursing  · Family  · Patient     RECOMMENDATIONS for CONTINUED SPEECH THERAPY: YES: Anticipate need for ongoing speech therapy during this hospitalization and at next level of care. Compliance with Program/Exercises: Will assess as treatment progresses  Continuation of Skilled Services/Medical Necessity:   Patient is expected to demonstrate progress in cognitive ability and dysarthria to decrease assistance required communication, increase independence with activities of daily living and increase communication with family/caregivers.  Patient continues to require skilled intervention due to dysarthria and cognitive linguistic deficits.      SAFETY:  After treatment position/precautions:  · Upright in bed  · RN notified  · wife at bedside  · Call light within reach    Total Treatment Duration:   Time In: 1007  Time Out: 16[facility]

## 2019-12-26 NOTE — PROGRESS NOTES
Hospitalist Note     Admit Date:  2019  9:07 AM   Name:  Sandra Allen   Age:  53 y.o.  :  1975   MRN:  [id number]   PCP:  Manuel Pitts MD    Mr. Latrell Collins is a 54 y/o smoker with DM, HTN, who presented to ER  with L leg and arm weakness, L facial droop, dysarthria. First noted L leg weakness late night  when he woke up to go to the bathroom. He was normal when he went to bed around 1030. EMS called. Noted to have slurred speech and L facial droop as well. Code S called in ER around 9am.  MRI with acute infarcts in L cerebellar hemisphere, deep frontoparietal white matter, and R paramedian yariel. Also noted old lacunar infarcts. No large vessel occlusion on CTA, but some stenosis noted. He was started on asa, statins. Neurology consulted. An ECHO showed PFO. Plan for cardiology referral and evaluate for closure as outpatient. PT/OT suggested inpatient rehabilitation. The patient is uninsured and the only option at this moment is [facility]. 2019  When I examined the patient, lying on the bed denies chest pain or shortness of breath  Updated pt wife at bedside    Subjective:   He was found in no distress, eating his lunch. He feels okay, denies acute complains. Objective:     Patient Vitals for the past 24 hrs:   Temp Pulse Resp BP SpO2   19 1200 98.1 °F (36.7 °C) 93 20 104/69 93 %   19 0800 98 °F (36.7 °C) (!) 59 20 114/60 95 %   19 0400 99.7 °F (37.6 °C) 60 19 129/76 96 %   19 0000 98.8 °F (37.1 °C) 62 19 118/75 99 %   19 2000 98.7 °F (37.1 °C) 60 18 132/75 96 %   19 1600 99 °F (37.2 °C) (!) 58 17 110/75 96 %     Oxygen Therapy  O2 Sat (%): 93 % (19 1200)  Pulse via Oximetry: 73 beats per minute (19 1615)  No intake or output data in the 24 hours ending 19 1220      Physical Exam:  General:    Well nourished. Alert and oriented. CV:   RRR. No murmur, rub, or gallop. Lungs:  Clear to auscultation bilaterally.   No wheezing, rhonchi, or rales  Extremities: Warm and dry. No cyanosis or edema. Neurologic: CN II-XII intact except for mild L facial droop. Sensation intact. PERRLA.  dysarthria. No confusion. STR 1/5 LUE and LLE. Skin:     No rashes or jaundice. No wounds. Psych:  Normal mood and affect. I reviewed the labs, imaging, EKGs, telemetry, and other studies done this admission.     Data Review:   Recent Results (from the past 24 hour(s))   GLUCOSE, POC    Collection Time: 12/25/19  4:33 PM   Result Value Ref Range    Glucose (POC) 83 65 - 100 mg/dL   GLUCOSE, POC    Collection Time: 12/25/19  9:06 PM   Result Value Ref Range    Glucose (POC) 101 (H) 65 - 100 mg/dL   GLUCOSE, POC    Collection Time: 12/26/19  7:19 AM   Result Value Ref Range    Glucose (POC) 126 (H) 65 - 100 mg/dL   GLUCOSE, POC    Collection Time: 12/26/19 11:18 AM   Result Value Ref Range    Glucose (POC) 117 (H) 65 - 100 mg/dL       All Micro Results     None          Current Facility-Administered Medications   Medication Dose Route Frequency    insulin glargine (LANTUS) injection 10 Units  10 Units SubCUTAneous QHS    pantoprazole (PROTONIX) tablet 40 mg  40 mg Oral ACB    guaiFENesin (ROBITUSSIN) 100 mg/5 mL oral liquid 100 mg  100 mg Oral Q4H PRN    metoprolol tartrate (LOPRESSOR) tablet 100 mg  100 mg Oral BID    hydrALAZINE (APRESOLINE) 20 mg/mL injection 20 mg  20 mg IntraVENous Q4H PRN    atorvastatin (LIPITOR) tablet 80 mg  80 mg Oral QHS    amLODIPine (NORVASC) tablet 10 mg  10 mg Oral DAILY    lisinopril (PRINIVIL, ZESTRIL) tablet 40 mg  40 mg Oral DAILY    sodium chloride (NS) flush 5-40 mL  5-40 mL IntraVENous Q8H    sodium chloride (NS) flush 5-40 mL  5-40 mL IntraVENous PRN    ondansetron (ZOFRAN) injection 4 mg  4 mg IntraVENous Q4H PRN    aspirin chewable tablet 81 mg  81 mg Oral DAILY    acetaminophen (TYLENOL) tablet 650 mg  650 mg Oral Q4H PRN    polyethylene glycol (MIRALAX) packet 17 g  17 g Oral DAILY PRN    enoxaparin (LOVENOX) injection 40 mg  40 mg SubCUTAneous Q24H    insulin lispro (HUMALOG) injection   SubCUTAneous AC&HS    dextrose 40% (GLUTOSE) oral gel 1 Tube  15 g Oral PRN    glucagon (GLUCAGEN) injection 1 mg  1 mg IntraMUSCular PRN    dextrose (D50W) injection syrg 12.5-25 g  25-50 mL IntraVENous PRN       Other Studies:  No results found. Results for orders placed or performed during the hospital encounter of 19   2D ECHO COMPLETE ADULT (TTE) [address]  [address] [facility], [address] [facility]  [phone]    Transthoracic Echocardiogram  2D, M-mode, Doppler, and Color Doppler    Patient: Alexandra Ford  MR #: [id number]  : 1975  Age: 53 years  Gender: Male  Study date: 12-Dec-2019  Account #: [de-identified]  Height: 66 in  Weight: 209.7 lb  BSA: 2.04 mï¾²  Status:Routine  Location: ER03  BP: 133/ 76    Allergies: NO KNOWN ALLERGENS    Sonographer:  Gerhardt Butler, RCS  Group:  [facility]  Referring Physician:  Franci Gooden. Sabrina Quiroz MD  Reading Physician:  Ebony Dorman MD [facility]    INDICATIONS: CVA    PROCEDURE: This was a routine study. A transthoracic echocardiogram was  performed. The study included complete 2D imaging, M-mode, complete spectral  Doppler, and color Doppler. Intravenous contrast (Definity) was administered. Image quality was adequate. LEFT VENTRICLE: Size was normal. Systolic function was normal. Ejection  fraction was estimated in the range of 65 % to 70 %. There were no regional  wall motion abnormalities. There was mild concentric hypertrophy. Left  ventricular diastolic function parameters were normal. Avg E/e': 11.05.    RIGHT VENTRICLE: The size was normal. Systolic function was normal. The  tricuspid jet envelope definition was inadequate for estimation of RV   systolic  pressure.     LEFT ATRIUM: Size was normal.    ATRIAL SEPTUM: Agitated saline contrast injection (bubble study) was   performed. There was a right-to-left shunt, in the baseline state. RIGHT ATRIUM: Size was normal.    SYSTEMIC VEINS: IVC: The inferior vena cava was normal in size and course. The  respirophasic change in diameter was more than 50%. AORTIC VALVE: The valve was trileaflet. Leaflets exhibited mild sclerosis. There was no evidence for stenosis. There was no insufficiency. MITRAL VALVE: Valve structure was normal. There was no evidence for stenosis. There was no regurgitation. TRICUSPID VALVE: The valve structure was normal. There was no evidence for  stenosis. There was trivial regurgitation. PULMONIC VALVE: Not well visualized. There was no evidence for stenosis. There  was no insufficiency. PERICARDIUM: There was no pericardial effusion. AORTA: The root exhibited normal size. SUMMARY:    -  Left ventricle: Systolic function was normal. Ejection fraction was  estimated in the range of 65 % to 70 %. There were no regional wall motion  abnormalities. There was mild concentric hypertrophy. -  Atrial septum: Agitated saline contrast injection (bubble study) was  performed. There was a right-to-left shunt, in the baseline state. -  Inferior vena cava, hepatic veins: The respirophasic change in diameter   was  more than 50%. SYSTEM MEASUREMENT TABLES    2D mode  AoR Diam (2D): 2.8 cm  LA Dimension (2D): 3.5 cm  Left Atrium Systolic Volume Index; Method of Disks, Biplane; 2D mode;: 26   ml/m2  IVS/LVPW (2D): 1  IVSd (2D): 1.2 cm  LVIDd (2D): 5.2 cm  LVIDs (2D): 2.9 cm  LVOT Area (2D): 2.3 cm2  LVPWd (2D): 1.2 cm  RVIDd (2D): 2.2 cm    Tissue Doppler Imaging  LV Peak Early Thomas Tissue Rc; Lateral MA (TDI): 8.1 cm/s  LV Peak Early Thomas Tissue Rc; Medial MA (TDI): 7.6 cm/s    Unspecified Scan Mode  Peak Grad; Mean; Antegrade Flow: 14 mm[Hg]  Vmax;  Antegrade Flow: 185 cm/s  LVOT Diam: 1.7 cm  MV Peak Rc/LV Peak Tissue Rc E-Wave; Lateral MA: 10.7  MV Peak Rc/LV Peak Tissue Rc E-Wave; Medial MA: 11.4    Prepared and signed by    Esther Lomas. Aldo Machado MD [facility] [place]  Signed 12-Dec-2019 15:46:58             Assessment and Plan:     - Acute embolic stroke with persistent LUE/LLE, TTE showing large PFO with shunt:   continue high intensity management, aspirin   blood sugar control  PT/OT/speech therapy  needs outpatient cardiology follow-up for shunt closure  neurology has been following    -HTN: continue current regimen    -Type 2 DM:pt was on 4 units of short acting insulin tid will d/c it and start on long acting insulin will cont  humalog ISS    Ppx: Lovenox for VTE    Code Status: FULL CODE    Disposition: patient has been medically clear for discharge but is without insurance, needs rehab/placement. Only option is [facility].  CM on board     Signed:  Jessica Pelaez MD

## 2019-12-26 NOTE — INTERDISCIPLINARY ROUNDS
Interdisciplinary team rounds were held 12/26/2019 with the following team members:  Care Management, Physical Therapy, Physician and . Plan of care discussed. See clinical pathway and/or care plan for interventions and desired outcomes.

## 2019-12-26 NOTE — PROGRESS NOTES
12/26/19 0659   NIH Stroke Scale   Interval Other (comment)  (Dual NIH w/ Caleb Smiles)   LOC 0   LOC Questions 0   LOC Commands 0   Best Gaze 0   Visual 0   Facial Palsy 2   Motor Right Arm 0   Motor Left Arm 4   Motor Right Leg 0   Motor Left Leg 4   Limb Ataxia 0   Sensory 0   Best Language 0   Dysarthria 1   Extinction and Inattention 0   Total 11

## 2019-12-27 LAB
ANION GAP SERPL CALC-SCNC: 6 MMOL/L (ref 7–16)
BUN SERPL-MCNC: 16 MG/DL (ref 6–23)
CALCIUM SERPL-MCNC: 9.6 MG/DL (ref 8.3–10.4)
CHLORIDE SERPL-SCNC: 108 MMOL/L (ref 98–107)
CO2 SERPL-SCNC: 25 MMOL/L (ref 21–32)
CREAT SERPL-MCNC: 1.37 MG/DL (ref 0.8–1.5)
ERYTHROCYTE [DISTWIDTH] IN BLOOD BY AUTOMATED COUNT: 17.1 % (ref 11.9–14.6)
GLUCOSE BLD STRIP.AUTO-MCNC: 110 MG/DL (ref 65–100)
GLUCOSE BLD STRIP.AUTO-MCNC: 123 MG/DL (ref 65–100)
GLUCOSE BLD STRIP.AUTO-MCNC: 154 MG/DL (ref 65–100)
GLUCOSE BLD STRIP.AUTO-MCNC: 95 MG/DL (ref 65–100)
GLUCOSE SERPL-MCNC: 105 MG/DL (ref 65–100)
HCT VFR BLD AUTO: 34.7 % (ref 41.1–50.3)
HGB BLD-MCNC: 10.9 G/DL (ref 13.6–17.2)
MCH RBC QN AUTO: 25.3 PG (ref 26.1–32.9)
MCHC RBC AUTO-ENTMCNC: 31.4 G/DL (ref 31.4–35)
MCV RBC AUTO: 80.5 FL (ref 79.6–97.8)
NRBC # BLD: 0 K/UL (ref 0–0.2)
PLATELET # BLD AUTO: 321 K/UL (ref 150–450)
PMV BLD AUTO: 11.3 FL (ref 9.4–12.3)
POTASSIUM SERPL-SCNC: 3.7 MMOL/L (ref 3.5–5.1)
RBC # BLD AUTO: 4.31 M/UL (ref 4.23–5.6)
SODIUM SERPL-SCNC: 139 MMOL/L (ref 136–145)
WBC # BLD AUTO: 7.4 K/UL (ref 4.3–11.1)

## 2019-12-27 PROCEDURE — 74011250637 HC RX REV CODE- 250/637: Performed by: INTERNAL MEDICINE

## 2019-12-27 PROCEDURE — 74011250637 HC RX REV CODE- 250/637: Performed by: HOSPITALIST

## 2019-12-27 PROCEDURE — 74011636637 HC RX REV CODE- 636/637: Performed by: INTERNAL MEDICINE

## 2019-12-27 PROCEDURE — 80048 BASIC METABOLIC PNL TOTAL CA: CPT

## 2019-12-27 PROCEDURE — 97112 NEUROMUSCULAR REEDUCATION: CPT

## 2019-12-27 PROCEDURE — 36415 COLL VENOUS BLD VENIPUNCTURE: CPT

## 2019-12-27 PROCEDURE — 74011250636 HC RX REV CODE- 250/636: Performed by: INTERNAL MEDICINE

## 2019-12-27 PROCEDURE — 97530 THERAPEUTIC ACTIVITIES: CPT

## 2019-12-27 PROCEDURE — 65660000000 HC RM CCU STEPDOWN

## 2019-12-27 PROCEDURE — 92507 TX SP LANG VOICE COMM INDIV: CPT

## 2019-12-27 PROCEDURE — 65270000029 HC RM PRIVATE

## 2019-12-27 PROCEDURE — 82962 GLUCOSE BLOOD TEST: CPT

## 2019-12-27 PROCEDURE — 99232 SBSQ HOSP IP/OBS MODERATE 35: CPT | Performed by: PHYSICAL MEDICINE & REHABILITATION

## 2019-12-27 PROCEDURE — 85027 COMPLETE CBC AUTOMATED: CPT

## 2019-12-27 RX ADMIN — Medication 10 ML: at 21:12

## 2019-12-27 RX ADMIN — AMLODIPINE BESYLATE 10 MG: 10 TABLET ORAL at 09:39

## 2019-12-27 RX ADMIN — ENOXAPARIN SODIUM 40 MG: 40 INJECTION SUBCUTANEOUS at 14:22

## 2019-12-27 RX ADMIN — GUAIFENESIN 100 MG: 100 SOLUTION ORAL at 17:39

## 2019-12-27 RX ADMIN — INSULIN LISPRO 2 UNITS: 100 INJECTION, SOLUTION INTRAVENOUS; SUBCUTANEOUS at 12:29

## 2019-12-27 RX ADMIN — LISINOPRIL 40 MG: 20 TABLET ORAL at 09:39

## 2019-12-27 RX ADMIN — Medication 10 ML: at 14:22

## 2019-12-27 RX ADMIN — METOPROLOL TARTRATE 100 MG: 50 TABLET, FILM COATED ORAL at 09:39

## 2019-12-27 RX ADMIN — Medication 10 ML: at 05:33

## 2019-12-27 RX ADMIN — ATORVASTATIN CALCIUM 80 MG: 80 TABLET, FILM COATED ORAL at 21:10

## 2019-12-27 RX ADMIN — ASPIRIN 81 MG 81 MG: 81 TABLET ORAL at 09:39

## 2019-12-27 RX ADMIN — INSULIN GLARGINE 10 UNITS: 100 INJECTION, SOLUTION SUBCUTANEOUS at 21:12

## 2019-12-27 RX ADMIN — PANTOPRAZOLE SODIUM 40 MG: 40 TABLET, DELAYED RELEASE ORAL at 05:32

## 2019-12-27 NOTE — PROGRESS NOTES
Problem: Self Care Deficits Care Plan (Adult)  Goal: *Acute Goals and Plan of Care (Insert Text)  Description  1. Patient will complete sitting balance edge of bed with ADL tasks/OT activity with supervision. 2. Patient will demonstrate appropriate safety awareness and protection of L UE during bed mobility and functional transfers with minimal cues. 3. Patient will complete upper body bathing/dressing with minimal assistance to improve participation with ADL. 4. Patient will complete weightbearing into the L UE with ADL tasks with minimal assistance to improve ability to use as a functional assist during ADL tasks. 5. Patient will participation in 8 minutes of therapeutic exercises with moderate assistance to improve strength in the L UE for ADL/functional transfers. 6. Patient will attempt transfer to the chair/BSC within 3 visits to demonstrate advancement with functional transfers. Timeframe: 7 visits   Outcome: Progressing Towards Goal     OCCUPATIONAL THERAPY: Daily Note and AM    12/27/2019  INPATIENT: OT Visit Days: 3  Payor: MEDICAID PENDING / Plan: Nimisha Colon PENDING / Product Type: Medicaid /      NAME/AGE/GENDER: Jenine Oppenheim is a 53 y.o. male   PRIMARY DIAGNOSIS:  Acute ischemic stroke ([address] [I63.9]  Cerebrovascular accident (CVA) due to embolism ([address] [Y66.8] Acute embolic stroke ([address] Acute embolic stroke ([address]       ICD-10: Treatment Diagnosis:    · Generalized Muscle Weakness (M62.81)  · Other lack of cordination (R27.8)  · Hemiplegia and hemiparesis following cerebral infarction affecting   · left non-dominant side (I69.354)  · Abnormal posture (R29.3)   Precautions/Allergies:     Patient has no known allergies. ASSESSMENT:     Mr. Osmar Davey presents to the hospital with L sided weakness and acute ischemic CVA. MRI revealed acute to subacute L cerebellar and R paramedian yariel infarcts.    12/27/2019 Pt presents in supine upon arrival. Pt shook head yes to participating in therapy. Pt transferred from supine to sit with mod a x's 2. Pt sat edge of bed approximately 15 minutes while completing NDT listed in the following grid. Pt's balance continues to wax and wane between fair to poor. Pt stood with mod a x's 2, 2 reps with a gait belt and then completed SPT with mod a x's 2 from bed to the chair. Pt left up in the chair with posey on and belongings in reach. Good effort. Continue POC. This section established at most recent assessment   PROBLEM LIST (Impairments causing functional limitations):  1. Decreased Strength  2. Decreased ADL/Functional Activities  3. Decreased Transfer Abilities  4. Decreased Ambulation Ability/Technique  5. Decreased Balance  6. Decreased Activity Tolerance  7. Increased Fatigue  8. Decreased Flexibility/Joint Mobility  9. Decreased Knowledge of Precautions  10. Decreased [place] with Home Exercise Program  11. Decreased Cognition   INTERVENTIONS PLANNED: (Benefits and precautions of occupational therapy have been discussed with the patient.)  1. Activities of daily living training  2. Adaptive equipment training  3. Balance training  4. Clothing management  5. Cognitive training  6. Donning&doffing training  7. Keanu tech training  8. Neuromuscular re-eduation  9. Therapeutic activity  10. Therapeutic exercise     TREATMENT PLAN: Frequency/Duration: Follow patient 3 times per week to address above goals. Rehabilitation Potential For Stated Goals: Excellent     REHAB RECOMMENDATIONS (at time of discharge pending progress):    Placement: It is my opinion, based on this patient's performance to date, that Mr. Alondra Altamirano may benefit from intensive therapy at an [address] [place] [address] after discharge due to potential to make ongoing and sustainable functional improvement that is of practical value. .  Equipment:    TBD               OCCUPATIONAL PROFILE AND HISTORY:   History of Present Injury/Illness (Reason for Referral):  See H&P  Past Medical History/Comorbidities:   Mr. Shanell Cueva  has a past medical history of Acute ischemic stroke ([facility] [address] (12/12/2019), Acute pancreatitis (11/19/2014), Cerebrovascular accident (CVA) due to embolism ([address] (12/12/2019), Diabetes ([address] (2002), Diabetes ([address] Diabetes mellitus, and Hypertension. He also has no past medical history of Arthritis, Asthma, Autoimmune disease ([address] CAD (coronary artery disease), Cancer ([address] Chronic kidney disease, COPD, Dementia, Dementia ([address] Heart failure ([address] Ill-defined condition, Infectious disease, Liver disease, Other ill-defined conditions(799.89), Psychiatric disorder, PUD (peptic ulcer disease), Seizures ([address] or Sleep disorder. Mr. Shanell Cueva  has a past surgical history that includes hx hernia repair and hx orthopaedic. Social History/Living Environment:   Home Environment: Apartment  # Steps to Enter: 12  Rails to Enter: Yes  [employer] : Bilateral  One/Two Story Residence: One story  Living Alone: No  Support Systems: Spouse/Significant Other/Partner  Patient Expects to be Discharged to[de-identified] Unknown  Current DME Used/Available at Home: None  Tub or Shower Type: Tub/Shower combination  Prior Level of Function/Work/Activity:  Pt lives at home with his wife. Pt is typically independent with ADL/functional mobility. Pt does not drive. Pt was working part-time at [employer] ([employer]). Personal Factors:          Age:  53 y.o. Past/Current Experience:  CVA with flaccid L side        Other factors that influence how disability is experienced by the patient:  multiple co-morbidities    Number of Personal Factors/Comorbidities that affect the Plan of Care: Extensive review of physical, cognitive, and psychosocial performance (3+):  HIGH COMPLEXITY   ASSESSMENT OF OCCUPATIONAL PERFORMANCE[de-identified]   Activities of Daily Living:   Basic ADLs (From Assessment) Complex ADLs (From Assessment)   Feeding: Stand-by assistance  Oral Facial Hygiene/Grooming:  Moderate assistance  Bathing: Maximum assistance  Upper Body Dressing: Maximum assistance  Lower Body Dressing: Total assistance  Toileting: Total assistance Instrumental ADL  Meal Preparation: Total assistance  Homemaking: Total assistance   Grooming/Bathing/Dressing Activities of Daily Living                             Bed/Mat Mobility  Rolling: Moderate assistance  Supine to Sit: Moderate assistance;Assist x2  Sit to Stand: Moderate assistance;Assist x2  Stand to Sit: Moderate assistance;Assist x2  Scooting: Total assistance     Most Recent Physical Functioning:   Gross Assessment:                  Posture:     Balance:  Sitting: Impaired  Sitting - Static: Fair (occasional)  Sitting - Dynamic: Poor (constant support)  Standing: Impaired  Standing - Static: Poor  Standing - Dynamic : Poor Bed Mobility:  Rolling: Moderate assistance  Supine to Sit: Moderate assistance;Assist x2  Scooting: Total assistance  Wheelchair Mobility:     Transfers:  Sit to Stand: Moderate assistance;Assist x2  Stand to Sit: Moderate assistance;Assist x2  Stand Pivot Transfers: Moderate assistance;Maximum assistance;Assist x2            Patient Vitals for the past 6 hrs:   BP BP Patient Position SpO2 Pulse   12/27/19 1200 120/77 Sitting 95 % (!) 53       Mental Status  Neurologic State: Alert  Orientation Level: Oriented to person, Oriented to situation, Oriented to place, Disoriented to time  Cognition: Follows commands  Perception: Cues to attend to left side of body, Cues to maintain midline in standing, Tactile, Verbal, Visual  Perseveration: No perseveration noted  Safety/Judgement: Fall prevention                          Physical Skills Involved:  1. Range of Motion  2. Balance  3. Strength  4. Activity Tolerance  5. Fine Motor Control  6. Gross Motor Control Cognitive Skills Affected (resulting in the inability to perform in a timely and safe manner):  1. Perception  2. Expression Psychosocial Skills Affected:  1. Habits/Routines  2.  Self-Awareness   Number of elements that affect the Plan of Care: 5+:  HIGH COMPLEXITY   CLINICAL DECISION MAKING:   [facility] AM-PAC 6 Clicks   Daily Activity Inpatient Short Form  How much help from another person does the patient currently need. .. Total A Lot A Little None   1. Putting on and taking off regular lower body clothing? [x] 1   [] 2   [] 3   [] 4   2. Bathing (including washing, rinsing, drying)? [] 1   [x] 2   [] 3   [] 4   3. Toileting, which includes using toilet, bedpan or urinal?   [x] 1   [] 2   [] 3   [] 4   4. Putting on and taking off regular upper body clothing? [] 1   [x] 2   [] 3   [] 4   5. Taking care of personal grooming such as brushing teeth? [] 1   [x] 2   [] 3   [] 4   6. Eating meals? [] 1   [] 2   [x] 3   [] 4   © 2007, Trustees of [facility], under license to [employer]. All rights reserved      Score:  Initial: 11 Most Recent: X (Date: -- )    Interpretation of Tool:  Represents activities that are increasingly more difficult (i.e. Bed mobility, Transfers, Gait). Medical Necessity:     · Patient demonstrates   · good and excellent  ·  rehab potential due to higher previous functional level. Reason for Services/Other Comments:  · Patient continues to require skilled intervention due to   · Decreased independence with ADL/functional transfers that impacts overall quality of life. · .   Use of outcome tool(s) and clinical judgement create a POC that gives a: MODERATE COMPLEXITY         TREATMENT:   (In addition to Assessment/Re-Assessment sessions the following treatments were rendered)     Pre-treatment Symptoms/Complaints:    Pain: Initial:   Pain Intensity 1: 0  Post Session:  0       Neuromuscular Re-education: (20 minutes):  Exercise/activities per grid below to improve balance, coordination, kinesthetic sense, posture and proprioception. Required maximal visual, verbal and manual cues to promote motor control of right, upper extremity(s), trunk.   Re-Education Training  Re-Education Training: Yes   LLE Re-Education  Other Activities: lateral weight bearing through his L UE to increase biofeedback and proprioceptionTrunk Re-Education  Muscle Facilitation: head and trunk extension  Muscle Inhibition: trunk flexion and left lateral lean  Integration Activities: R UE to increase midline    Therapeutic Activity: (13 minutes): Therapeutic activities including Bed transfers, Chair transfers and static standing and SPT to improve mobility, strength and balance. Required moderate assist to promote static and dynamic balance in standing. Date:  12/15 Date:   Date:     Activity/Exercise Parameters Parameters Parameters   Shifting weight side to side and weightbearing into L UE 10 reps      Trunk flexion   5 reps      Trunk extension to upright posture 5 reps      Dynamic reaching to the L 15 reps      Identifying and visually attending to L side 6 reps                      Braces/Orthotics/Lines/Etc:   ·  None  Treatment/Session Assessment:    · Response to Treatment:  Pt demonstrated good participation. · Interdisciplinary Collaboration:   o Certified Occupational Therapy Assistant  o Registered Nurse  o Rehabilitation Attendant  · After treatment position/precautions:   o Up in chair  o Bed alarm/tab alert on  o Bed/Chair-wheels locked  o Call light within reach  o RN notified   · Compliance with Program/Exercises: Will assess as treatment progresses. · Recommendations/Intent for next treatment session: \"Next visit will focus on advancements to more challenging activities and reduction in assistance provided\".   Total Treatment Duration:  OT Patient Time In/Time Out  Time In: 5735(3221)  Time Out: 4559(4945)     Nakul Aggarwal

## 2019-12-27 NOTE — PROGRESS NOTES
Problem: Falls - Risk of  Goal: *Absence of Falls  Description  Document Mo Byrnes Fall Risk and appropriate interventions in the flowsheet.   Outcome: Progressing Towards Goal  Note: Fall Risk Interventions:  Mobility Interventions: Bed/chair exit alarm, Communicate number of staff needed for ambulation/transfer, OT consult for ADLs, Patient to call before getting OOB, PT Consult for assist device competence, Strengthening exercises (ROM-active/passive), Utilize walker, cane, or other assistive device, PT Consult for mobility concerns    Mentation Interventions: Adequate sleep, hydration, pain control, Bed/chair exit alarm, Door open when patient unattended, Increase mobility, More frequent rounding, Reorient patient, Toileting rounds, Update white board    Medication Interventions: Assess postural VS orthostatic hypotension, Bed/chair exit alarm, Patient to call before getting OOB, Teach patient to arise slowly    Elimination Interventions: Bed/chair exit alarm, Call light in reach, Patient to call for help with toileting needs, Stay With Me (per policy), Toileting schedule/hourly rounds, Toilet paper/wipes in reach    History of Falls Interventions: Bed/chair exit alarm, Door open when patient unattended         Problem: Patient Education: Go to Patient Education Activity  Goal: Patient/Family Education  Outcome: Progressing Towards Goal

## 2019-12-27 NOTE — PROGRESS NOTES
12/26/19 1913   NIH Stroke Scale   Interval Other (comment)  (Dual NIH with Mara Chung Rn)   LOC 0   LOC Questions 0   LOC Commands 0   Best Gaze 0   Visual 0   Facial Palsy 2   Motor Right Arm 0   Motor Left Arm 4   Motor Right Leg 0   Motor Left Leg 4   Limb Ataxia 0   Sensory 0   Best Language 0   Dysarthria 1   Extinction and Inattention 0   Total 11

## 2019-12-27 NOTE — PROGRESS NOTES
[facility] [place]    Dora Neal  Admit Date: 12/12/2019  Admit Diagnosis: Acute ischemic stroke ([facility] [I63.9]; Cerebrovascular accident (CVA) due to embolism ([facility] [I63.9]    Subjective     Patient remains at a very low functional level, due to plegic left side. Sitting balance is still poor; suggests poor functional outcome, however too early. I anticipate patient will require prolonged skilled rehab program/ skilled care beyond acute phase. I do not believe home discharge is realistic in near future. IRC would be a viable option, insurance issues aside if there is a concrete discharge plan in place beyond his rehab stay.     Objective:     Current Facility-Administered Medications   Medication Dose Route Frequency    insulin glargine (LANTUS) injection 10 Units  10 Units SubCUTAneous QHS    pantoprazole (PROTONIX) tablet 40 mg  40 mg Oral ACB    guaiFENesin (ROBITUSSIN) 100 mg/5 mL oral liquid 100 mg  100 mg Oral Q4H PRN    metoprolol tartrate (LOPRESSOR) tablet 100 mg  100 mg Oral BID    hydrALAZINE (APRESOLINE) 20 mg/mL injection 20 mg  20 mg IntraVENous Q4H PRN    atorvastatin (LIPITOR) tablet 80 mg  80 mg Oral QHS    amLODIPine (NORVASC) tablet 10 mg  10 mg Oral DAILY    lisinopril (PRINIVIL, ZESTRIL) tablet 40 mg  40 mg Oral DAILY    sodium chloride (NS) flush 5-40 mL  5-40 mL IntraVENous Q8H    sodium chloride (NS) flush 5-40 mL  5-40 mL IntraVENous PRN    ondansetron (ZOFRAN) injection 4 mg  4 mg IntraVENous Q4H PRN    aspirin chewable tablet 81 mg  81 mg Oral DAILY    acetaminophen (TYLENOL) tablet 650 mg  650 mg Oral Q4H PRN    polyethylene glycol (MIRALAX) packet 17 g  17 g Oral DAILY PRN    enoxaparin (LOVENOX) injection 40 mg  40 mg SubCUTAneous Q24H    insulin lispro (HUMALOG) injection   SubCUTAneous AC&HS    dextrose 40% (GLUTOSE) oral gel 1 Tube  15 g Oral PRN    glucagon (GLUCAGEN) injection 1 mg  1 mg IntraMUSCular PRN    dextrose (D50W) injection syrg 12.5-25 g  25-50 mL IntraVENous PRN       Visit Vitals  /77 (BP 1 Location: Right arm, BP Patient Position: Sitting)   Pulse (!) 53   Temp 98.2 °F (36.8 °C)   Resp 18   Ht 5' 6\" (1.676 m)   Wt 210 lb (95.3 kg)   SpO2 95%   BMI 33.89 kg/m²        Review of Systems: +antalgic gait. Denies chest pain, shortness of breath, cough, headache, visual problems, abdominal pain, dysurea, calf pain. Pertinent positives are as noted in the medical records and unremarkable otherwise. Physical Exam:   General: Alert and oriented. No acute cardio respiratory distress. HEENT: + L droop ,no scleral icterus  Oral mucosa moist without cyanosis, No bruit, No JVD. Lungs: Clear to auscultation   Heart: Regular rate and rhythm, S1, S2   No  murmurs    Abdomen: Soft, non-tender, nondistended. BS+   Genitourinary: defer   Neuromuscular:        PERRL, EOMI  + dysarthria. Follows simple commands consistently. Able to identify, recall repeat. LUE     Shoulder abduction  0 /5              Elbow flexion:  0 /5               Wrist extension:  0 /5              Finger flexion;   0/5  RUE    Shoulder abduction: 5 /5                Elbow flexion:   5/5               Wrist extension:5/5              Finger flexion:  5/5  LLE     Hip flexion:  0 /5              Knee extension:  0 /5               Ankle dorsiflexion: 0  /5              Ankle plantarflexion:  0 /5                                 RLE     Hip flexion:  5 /5              Knee extension:  5 /5               Ankle dorsiflexion:  5 /5              Ankle plantarflexion:   5/5  Sensory - intact to touch,proprioception. Skin/extremity: No rashes, no erythema. Calf non tender BLE.                                                                                                                                 Functional Assessment:  Gross Assessment  AROM: (R UE WFL; L UE non-functional AROM) (12/15/19 1200)  PROM: Generally decreased, functional(L UE) (12/15/19 1200)  Strength: (R UE WFL; L UE non-functional; flaccid) (12/15/19 1200)  Coordination: (non-functional in the L UE) (12/15/19 1200)  Tone: Abnormal(hypotonic in L UE) (12/15/19 1200)  Sensation: Intact(reports intact grossly to light touch) (12/15/19 1200)   Bed Mobility  Rolling: Moderate assistance (12/27/19 0935)  Supine to Sit: Moderate assistance;Assist x2 (12/27/19 0935)  Sit to Supine: Maximum assistance;Assist x2 (12/16/19 1100)  Scooting: Total assistance (12/27/19 0935)   Balance  Sitting: Impaired (12/27/19 0935)  Sitting - Static: Fair (occasional) (12/27/19 0935)  Sitting - Dynamic: Poor (constant support) (12/27/19 0935)  Standing: Impaired (12/27/19 0935)  Standing - Static: Poor (12/27/19 0935)  Standing - Dynamic : Poor (12/27/19 0935)               Bed/Mat Mobility  Rolling: Moderate assistance (12/27/19 0935)  Supine to Sit: Moderate assistance;Assist x2 (12/27/19 0935)  Sit to Supine: Maximum assistance;Assist x2 (12/16/19 1100)  Sit to Stand: Moderate assistance;Assist x2 (12/27/19 0935)  Stand to Sit: Moderate assistance;Assist x2 (12/27/19 0935)  Bed to Chair: Moderate assistance;Assist x2 (12/24/19 1100)  Scooting: Total assistance (12/27/19 0935)     Damari Menchaca Fall Risk Assessment:  Damari Menchaca Fall Risk  Mobility: Ambulates or transfers with assist devices or assistance (12/27/19 0801)  Mobility Interventions: Bed/chair exit alarm;Communicate number of staff needed for ambulation/transfer;OT consult for ADLs; Patient to call before getting OOB;PT Consult for assist device competence;Strengthening exercises (ROM-active/passive); Utilize walker, cane, or other assistive device;PT Consult for mobility concerns (12/27/19 0801)  Mentation: Alert, oriented x 3 (12/27/19 0801)  Mentation Interventions: Adequate sleep, hydration, pain control;Bed/chair exit alarm; Door open when patient unattended; Increase mobility;More frequent rounding;Reorient patient; Toileting rounds;Update white board (12/27/19 2515)  Medication: Patient receiving anticonvulsants, sedatives(tranquilizers), psychotropics or hypnotics, hypoglycemics, narcotics, sleep aids, antihypertensives, laxatives, or diuretics (12/27/19 0801)  Medication Interventions: Assess postural VS orthostatic hypotension;Bed/chair exit alarm; Patient to call before getting OOB; Teach patient to arise slowly (12/27/19 0801)  Elimination: Incontinence (12/27/19 0801)  Elimination Interventions: Bed/chair exit alarm;Call light in reach; Patient to call for help with toileting needs;Stay With Me (per policy); Toileting schedule/hourly rounds; Toilet paper/wipes in reach (12/27/19 0801)  Prior Fall History: Before admission in past 12 months _home or previous inpatient care) (12/27/19 0801)  History of Falls Interventions: Bed/chair exit alarm; Door open when patient unattended (12/27/19 0801)  Total Score: 4 (12/27/19 0801)  Standard Fall Precautions: Yes (12/27/19 0801)  High Fall Risk: Yes (12/27/19 0801)     Speech Assessment:         Ambulation:  Gait  Base of Support: Center of gravity altered;Narrowed; Shift to right (12/13/19 3576)  Speed/Clotilde: Delayed; Shuffled; Slow (12/13/19 9734)  Step Length: Left shortened;Right shortened (12/13/19 8429)  Gait Abnormalities: Decreased step clearance; Hemiplegic;Trunk sway increased (12/13/19 9141)  Ambulation - Level of Assistance:  Moderate assistance;Assist x2 (12/13/19 0852)  Distance (ft): 5 Feet (ft)(side steps edge of bed) (12/13/19 0852)  Assistive Device: Myles Jupiter, rolling;Gait belt (12/13/19 3036)     Labs/Studies:  Recent Results (from the past 72 hour(s))   GLUCOSE, POC    Collection Time: 12/24/19  4:26 PM   Result Value Ref Range    Glucose (POC) 158 (H) 65 - 100 mg/dL   GLUCOSE, POC    Collection Time: 12/24/19  9:24 PM   Result Value Ref Range    Glucose (POC) 121 (H) 65 - 100 mg/dL   GLUCOSE, POC    Collection Time: 12/25/19  7:06 AM   Result Value Ref Range    Glucose (POC) 127 (H) 65 - 100 mg/dL   GLUCOSE, POC    Collection Time: 12/25/19 11:29 AM   Result Value Ref Range    Glucose (POC) 187 (H) 65 - 100 mg/dL   GLUCOSE, POC    Collection Time: 12/25/19  4:33 PM   Result Value Ref Range    Glucose (POC) 83 65 - 100 mg/dL   GLUCOSE, POC    Collection Time: 12/25/19  9:06 PM   Result Value Ref Range    Glucose (POC) 101 (H) 65 - 100 mg/dL   GLUCOSE, POC    Collection Time: 12/26/19  7:19 AM   Result Value Ref Range    Glucose (POC) 126 (H) 65 - 100 mg/dL   GLUCOSE, POC    Collection Time: 12/26/19 11:18 AM   Result Value Ref Range    Glucose (POC) 117 (H) 65 - 100 mg/dL   GLUCOSE, POC    Collection Time: 12/26/19  4:00 PM   Result Value Ref Range    Glucose (POC) 125 (H) 65 - 100 mg/dL   GLUCOSE, POC    Collection Time: 12/26/19  8:37 PM   Result Value Ref Range    Glucose (POC) 120 (H) 65 - 100 mg/dL   CBC W/O DIFF    Collection Time: 12/27/19  5:12 AM   Result Value Ref Range    WBC 7.4 4.3 - 11.1 K/uL    RBC 4.31 4.23 - 5.6 M/uL    HGB 10.9 (L) 13.6 - 17.2 g/dL    HCT 34.7 (L) 41.1 - 50.3 %    MCV 80.5 79.6 - 97.8 FL    MCH 25.3 (L) 26.1 - 32.9 PG    MCHC 31.4 31.4 - 35.0 g/dL    RDW 17.1 (H) 11.9 - 14.6 %    PLATELET 524 506 - 432 K/uL    MPV 11.3 9.4 - 12.3 FL    ABSOLUTE NRBC 0.00 0.0 - 0.2 K/uL   METABOLIC PANEL, BASIC    Collection Time: 12/27/19  5:12 AM   Result Value Ref Range    Sodium 139 136 - 145 mmol/L    Potassium 3.7 3.5 - 5.1 mmol/L    Chloride 108 (H) 98 - 107 mmol/L    CO2 25 21 - 32 mmol/L    Anion gap 6 (L) 7 - 16 mmol/L    Glucose 105 (H) 65 - 100 mg/dL    BUN 16 6 - 23 MG/DL    Creatinine 1.37 0.8 - 1.5 MG/DL    GFR est AA >60 >60 ml/min/1.73m2    GFR est non-AA 59 (L) >60 ml/min/1.73m2    Calcium 9.6 8.3 - 10.4 MG/DL   GLUCOSE, POC    Collection Time: 12/27/19  7:30 AM   Result Value Ref Range    Glucose (POC) 110 (H) 65 - 100 mg/dL   GLUCOSE, POC    Collection Time: 12/27/19 11:31 AM   Result Value Ref Range    Glucose (POC) 154 (H) 65 - 100 mg/dL       Assessment:     Problem List as of 12/27/2019 Date Reviewed: 3/3/2017 Codes Class Noted - Resolved    PFO (patent foramen ovale) ICD-10-CM: Q21.1  ICD-9-CM: 745.5  12/17/2019 - Present        Arrhythmia ICD-10-CM: I49.9  ICD-9-CM: 427.9  12/15/2019 - Present        Hyperlipemia ICD-10-CM: E78.5  ICD-9-CM: 272.4  12/15/2019 - Present        * (Principal) Acute ischemic stroke ([facility] [address] ICD-10-CM: I63.9  ICD-9-CM: 434.91  12/12/2019 - Present        Microcytic anemia ICD-10-CM: D50.9  ICD-9-CM: 280.9  11/20/2014 - Present        Acute pancreatitis ICD-10-CM: K85.90  ICD-9-CM: 446.5  11/19/2014 - Present        GERD (gastroesophageal reflux disease) (Chronic) ICD-10-CM: K21.9  ICD-9-CM: 530.81  11/19/2014 - Present        Hypertension (Chronic) ICD-10-CM: I10  ICD-9-CM: 401.9  Unknown - Present        Type 2 diabetes mellitus (HCC) (Chronic) ICD-10-CM: E11.9  ICD-9-CM: 250.00  Unknown - Present              Plan:     Acute CVA -  left  hemiplegia, dysarthria, cognitive/ linguistic deficits. - aspirin, lipitor, risk factor management. - cardiology f/u for PFO/ R-L shunt    - Continue acute PT, OT to focus on left sided neuro deficits on acute floor. Continue balance activities, transfer training.   - short term rehab prognosis poor. Still with poor sitting balance, left hemiplegia. - I anticipate patient will require prolonged skilled rehab program/ skilled care beyond acute phase. I do not believe home discharge is realistic in near future. Patient lives with a wife who also has debilities due to stroke, lives on a 2nd level apt. IRC would be a viable option, insurance issues aside if there is a concrete discharge plan in place beyond his rehab stay. - will need placement options. Discussed with CM.       Signed By: Emelia Feliciano MD     December 27, 2019

## 2019-12-27 NOTE — PROGRESS NOTES
Problem: Self Care Deficits Care Plan (Adult)  Goal: *Acute Goals and Plan of Care (Insert Text)  Description  1. Patient will complete sitting balance edge of bed with ADL tasks/OT activity with supervision. 2. Patient will demonstrate appropriate safety awareness and protection of L UE during bed mobility and functional transfers with minimal cues. 3. Patient will complete upper body bathing/dressing with minimal assistance to improve participation with ADL. 4. Patient will complete weightbearing into the L UE with ADL tasks with minimal assistance to improve ability to use as a functional assist during ADL tasks. 5. Patient will participation in 8 minutes of therapeutic exercises with moderate assistance to improve strength in the L UE for ADL/functional transfers. 6. Patient will attempt transfer to the chair/BSC within 3 visits to demonstrate advancement with functional transfers. Timeframe: 7 visits   Outcome: Progressing Towards Goal     OCCUPATIONAL THERAPY: Daily Note and AM    12/27/2019  INPATIENT: OT Visit Days: 3  Payor: MEDICAID PENDING / Plan: [place] PENDING / Product Type: Medicaid /      NAME/AGE/GENDER: Chel Wood is a 53 y.o. male   PRIMARY DIAGNOSIS:  Acute ischemic stroke ([address] [I63.9]  Cerebrovascular accident (CVA) due to embolism ([address] [D30.5] Acute embolic stroke ([address] Acute embolic stroke ([address]       ICD-10: Treatment Diagnosis:    · Generalized Muscle Weakness (M62.81)  · Other lack of cordination (R27.8)  · Hemiplegia and hemiparesis following cerebral infarction affecting   · left non-dominant side (I69.354)  · Abnormal posture (R29.3)   Precautions/Allergies:     Patient has no known allergies. ASSESSMENT:     Mr. Alondra Altamirano presents to the hospital with L sided weakness and acute ischemic CVA. MRI revealed acute to subacute L cerebellar and R paramedian yariel infarcts.    12/27/2019 Pt presents in supine upon arrival. Pt shook head yes to participating in therapy. Pt transferred from supine to sit with mod a x's 2. Pt sat edge of bed approximately 15 minutes while completing NDT listed in the following grid. Pt's balance continues to wax and wane between fair to poor. Pt stood with mod a x's 2, 2 reps with a gait belt and then completed SPT with mod a x's 2 from bed to the chair. Pt left up in the chair with posey on and belongings in reach. Good effort. Continue POC. Addendum: Therapist returned to room at [phone] to assist pt from the bed to the chair with same level of assist aforementioned. Pt left with belongings in reach. Good effort. Continue POC. This section established at most recent assessment   PROBLEM LIST (Impairments causing functional limitations):  1. Decreased Strength  2. Decreased ADL/Functional Activities  3. Decreased Transfer Abilities  4. Decreased Ambulation Ability/Technique  5. Decreased Balance  6. Decreased Activity Tolerance  7. Increased Fatigue  8. Decreased Flexibility/Joint Mobility  9. Decreased Knowledge of Precautions  10. Decreased [place] with Home Exercise Program  11. Decreased Cognition   INTERVENTIONS PLANNED: (Benefits and precautions of occupational therapy have been discussed with the patient.)  1. Activities of daily living training  2. Adaptive equipment training  3. Balance training  4. Clothing management  5. Cognitive training  6. Donning&doffing training  7. Keanu tech training  8. Neuromuscular re-eduation  9. Therapeutic activity  10. Therapeutic exercise     TREATMENT PLAN: Frequency/Duration: Follow patient 3 times per week to address above goals. Rehabilitation Potential For Stated Goals: Excellent     REHAB RECOMMENDATIONS (at time of discharge pending progress):    Placement:   It is my opinion, based on this patient's performance to date, that Mr. Sebastián Ramos may benefit from intensive therapy at an [address] [place] [address] after discharge due to potential to make ongoing and sustainable functional improvement that is of practical value. .  Equipment:    TBD               OCCUPATIONAL PROFILE AND HISTORY:   History of Present Injury/Illness (Reason for Referral):  See H&P  Past Medical History/Comorbidities:   Mr. Elvin Farooq  has a past medical history of Acute ischemic stroke ([address] (12/12/2019), Acute pancreatitis (11/19/2014), Cerebrovascular accident (CVA) due to embolism ([address] (12/12/2019), Diabetes ([address] (2002), Diabetes ([address] Diabetes mellitus, and Hypertension. He also has no past medical history of Arthritis, Asthma, Autoimmune disease ([address] CAD (coronary artery disease), Cancer ([address] Chronic kidney disease, COPD, Dementia, Dementia ([address] Heart failure ([address] Ill-defined condition, Infectious disease, Liver disease, Other ill-defined conditions(799.89), Psychiatric disorder, PUD (peptic ulcer disease), Seizures ([address] or Sleep disorder. Mr. Elvin Farooq  has a past surgical history that includes hx hernia repair and hx orthopaedic. Social History/Living Environment:   Home Environment: Apartment  # Steps to Enter: 12  Rails to Enter: Yes  [employer] : Bilateral  One/Two Story Residence: One story  Living Alone: No  Support Systems: Spouse/Significant Other/Partner  Patient Expects to be Discharged to[de-identified] Unknown  Current DME Used/Available at Home: None  Tub or Shower Type: Tub/Shower combination  Prior Level of Function/Work/Activity:  Pt lives at home with his wife. Pt is typically independent with ADL/functional mobility. Pt does not drive. Pt was working part-time at [employer]. Personal Factors:          Age:  53 y.o.         Past/Current Experience:  CVA with flaccid L side        Other factors that influence how disability is experienced by the patient:  multiple co-morbidities    Number of Personal Factors/Comorbidities that affect the Plan of Care: Extensive review of physical, cognitive, and psychosocial performance (3+):  HIGH COMPLEXITY   ASSESSMENT OF OCCUPATIONAL PERFORMANCE[de-identified] Activities of Daily Living:   Basic ADLs (From Assessment) Complex ADLs (From Assessment)   Feeding: Stand-by assistance  Oral Facial Hygiene/Grooming: Moderate assistance  Bathing: Maximum assistance  Upper Body Dressing: Maximum assistance  Lower Body Dressing: Total assistance  Toileting: Total assistance Instrumental ADL  Meal Preparation: Total assistance  Homemaking: Total assistance   Grooming/Bathing/Dressing Activities of Daily Living                             Bed/Mat Mobility  Rolling: Moderate assistance  Supine to Sit: Moderate assistance;Assist x2  Sit to Stand: Moderate assistance;Assist x2  Stand to Sit: Moderate assistance;Assist x2  Scooting: Total assistance     Most Recent Physical Functioning:   Gross Assessment:                  Posture:     Balance:  Sitting: Impaired  Sitting - Static: Fair (occasional)  Sitting - Dynamic: Poor (constant support)  Standing: Impaired  Standing - Static: Poor  Standing - Dynamic : Poor Bed Mobility:  Rolling: Moderate assistance  Supine to Sit: Moderate assistance;Assist x2  Scooting: Total assistance  Wheelchair Mobility:     Transfers:  Sit to Stand: Moderate assistance;Assist x2  Stand to Sit: Moderate assistance;Assist x2  Stand Pivot Transfers: Moderate assistance;Maximum assistance;Assist x2            Patient Vitals for the past 6 hrs:   BP BP Patient Position SpO2 Pulse   12/27/19 1200 120/77 Sitting 95 % (!) 53       Mental Status  Neurologic State: Alert  Orientation Level: Oriented to person, Oriented to situation, Oriented to place, Disoriented to time  Cognition: Follows commands  Perception: Cues to attend to left side of body, Cues to maintain midline in standing, Tactile, Verbal, Visual  Perseveration: No perseveration noted  Safety/Judgement: Fall prevention                          Physical Skills Involved:  1. Range of Motion  2. Balance  3. Strength  4. Activity Tolerance  5. Fine Motor Control  6.  Gross Motor Control Cognitive Skills Affected (resulting in the inability to perform in a timely and safe manner):  1. Perception  2. Expression Psychosocial Skills Affected:  1. Habits/Routines  2. Self-Awareness   Number of elements that affect the Plan of Care: 5+:  HIGH COMPLEXITY   CLINICAL DECISION MAKIN[address] [place] [address] AM-PAC 6 Clicks   Daily Activity Inpatient Short Form  How much help from another person does the patient currently need. .. Total A Lot A Little None   1. Putting on and taking off regular lower body clothing? [x] 1   [] 2   [] 3   [] 4   2. Bathing (including washing, rinsing, drying)? [] 1   [x] 2   [] 3   [] 4   3. Toileting, which includes using toilet, bedpan or urinal?   [x] 1   [] 2   [] 3   [] 4   4. Putting on and taking off regular upper body clothing? [] 1   [x] 2   [] 3   [] 4   5. Taking care of personal grooming such as brushing teeth? [] 1   [x] 2   [] 3   [] 4   6. Eating meals? [] 1   [] 2   [x] 3   [] 4   © , Trustees of [address] [place] [address], under license to [employer]. All rights reserved      Score:  Initial: 11 Most Recent: X (Date: -- )    Interpretation of Tool:  Represents activities that are increasingly more difficult (i.e. Bed mobility, Transfers, Gait). Medical Necessity:     · Patient demonstrates   · good and excellent  ·  rehab potential due to higher previous functional level. Reason for Services/Other Comments:  · Patient continues to require skilled intervention due to   · Decreased independence with ADL/functional transfers that impacts overall quality of life.    · .   Use of outcome tool(s) and clinical judgement create a POC that gives a: MODERATE COMPLEXITY         TREATMENT:   (In addition to Assessment/Re-Assessment sessions the following treatments were rendered)     Pre-treatment Symptoms/Complaints:    Pain: Initial:   Pain Intensity 1: 0  Post Session:  0       Neuromuscular Re-education: (20 minutes):  Exercise/activities per grid below to improve balance, coordination, kinesthetic sense, posture and proprioception. Required maximal visual, verbal and manual cues to promote motor control of right, upper extremity(s), trunk. Re-Education Training  Re-Education Training: Yes   LLE Re-Education  Other Activities: lateral weight bearing through his L UE to increase biofeedback and proprioceptionTrunk Re-Education  Muscle Facilitation: head and trunk extension  Muscle Inhibition: trunk flexion and left lateral lean  Integration Activities: R UE to increase midline    Therapeutic Activity: (13 minutes): Therapeutic activities including Bed transfers, Chair transfers and static standing and SPT to improve mobility, strength and balance. Required moderate assist to promote static and dynamic balance in standing. Therapeutic Activity: (8 minutes): Therapeutic activities including Bed transfers and static/dynamic standing to improve mobility, strength and balance. Required moderate assist x's 2  to promote static and dynamic balance in standing. Date:  12/15 Date:   Date:     Activity/Exercise Parameters Parameters Parameters   Shifting weight side to side and weightbearing into L UE 10 reps      Trunk flexion   5 reps      Trunk extension to upright posture 5 reps      Dynamic reaching to the L 15 reps      Identifying and visually attending to L side 6 reps                      Braces/Orthotics/Lines/Etc:   ·  None  Treatment/Session Assessment:    · Response to Treatment:  Pt demonstrated good participation. · Interdisciplinary Collaboration:   o Certified Occupational Therapy Assistant  o Registered Nurse  o Rehabilitation Attendant  · After treatment position/precautions:   o Up in chair  o Bed alarm/tab alert on  o Bed/Chair-wheels locked  o Call light within reach  o RN notified   · Compliance with Program/Exercises: Will assess as treatment progresses. · Recommendations/Intent for next treatment session:   \"Next visit will focus on advancements to more challenging activities and reduction in assistance provided\".   Total Treatment Duration:  OT Patient Time In/Time Out  Time In: 4893(3751)  Time Out: 5431(9320)     Ashutosh Gibson

## 2019-12-27 NOTE — PROGRESS NOTES
Neurolinguistic Goals:  LTG: Patient will increase neuro-linguistic abilities to increase safety and awareness in functional living environment   STG: Patient will participate in speech/language/cognitive evaluation.  MET 12/17/19  STG: Patient will solve mathematical problems with 80%accuracy given minimal cueing   STG: Patient will name 10 items to concrete category in 1 minute given minimal cueing  STG: Patient will participate in verbal reasoning tasks with 80% accuracy given minimal cueing  STG: Patient will complete mental manipulation tasks with 80% accuracy given minimal cueing  STG: Patient will complete working memory/attention tasks with 80% accuracy given minimal cueing  STG: Patient will recall relevant verbal information with 80% accuracy with minimal assistance  STG: Patient will utilize memory compensatory strategies to improve recall of functional list/message with 90%accuracy given minimal assistance  STG: Patient will participate in ongoing cognitive linguistic evaluation for therapeutic assessment      Dysarthria Goals:  LTG: Patient will develop functional and intelligible speech and utilize compensatory strategies to improve communication across environments  STG: Patient will fill in carrier phrase/complete automatic speech tasks with 95% accuracy given minimal cueing  STG: Patient will repeat phrases, sentences with 85% accuracy given minimal cueing  STG: Patient will utilize compensatory strategies across tasks with 90% accuracy given minimal cueing    SPEECH LANGUAGE PATHOLOGY: SPEECH-LANGUAGE/COGNITION: Daily Note 4    NAME/AGE/GENDER: Maya Kelley is a 53 y.o. male  DATE: 12/27/2019  PRIMARY DIAGNOSIS: Acute ischemic stroke ([facility] [I63.9]  Cerebrovascular accident (CVA) due to embolism ([facility] [facility] [I63.9]      ICD-10: Treatment Diagnosis: R47.1 Dysarthria and Anarthria  R41.841 Cognitive-Communication Deficit    INTERDISCIPLINARY COLLABORATION: Registered Nurse  PRECAUTIONS/ALLERGIES: Patient has no known allergies. SUBJECTIVE   Alert upright in bed for session. Problem List:  (Impairments causing functional limitations):  1. Cognitive linguistic deficits  2. Dysarthria     Orientation:   Person  Place  Time  Situation     Pain: Pain Scale 1: Numeric (0 - 10)  Pain Intensity 1: 0     OBJECTIVE   The following treatment tasks were completed:   Functional memory: recalled 1/3 compensatory strategies for dysarthria. No utilizing initially in session;however, post education patient attempted to utilize strategies throughout. Repeating 3 word phrases: 8/10 intelligible given min-mod verbal cues  Reasoning/stating similarity between 2 like items:   7/10 accurate given minimal cues. Short term memory/Word List Retention- Recalling attribute given 3 words: 3/5 accurate given moderate verbal cues. ASSESSMENT   Patient continues to present with moderate cognitive linguistic impairment and moderate dysarthria. Improved carryover of compensatory strategies for improving speech intelligibility in structured tasks this date. Patient noted to have difficulty initiating speech at times requiring increased response time. Required repetition of word list and phrases during motor speech and memory tasks due to reduced attention and working memory. Patient will benefit from ongoing skilled intervention to improve speech intelligibility and cognitive linguistic skills. Tool Used: MODIFIED BRITT SCALE (mRS)   Score   No Symptoms  [] 0   No significant disability despite symptoms; able to carry out all usual duties and activities  [] 1   Slight disability; unable to carry out all previous activities but able to look after own affairs without assistance.    [] 2   Moderate disability; requiring some help but able to walk without assistance  [] 3   Moderately severe disability; unable to walk without assistance and unable to attend to own bodily needs without assistance  [] 4   Severe disability; bedridden, incontinent, and requiring constant nursing care and attention  [] 5      Score:  Initial: 2 Most Recent: 2  (Date 12/27/19 )   Interpretation of Tool: The Modified Garfield Scale is a 7-point scaled used to quantify level of disability as it relates to a patient's functional abilities. PLAN    FREQUENCY/DURATION: Continue to follow patient 3 times a week for duration of hospital stay to address above goals. - Recommendations for next treatment session: Next treatment will address cognitive linguistic and dysarthria treatment   REHABILITATION POTENTIAL FOR STATED GOALS: Excellent           RECOMMENDATIONS   STRATEGIES:   Dysarthria strategies  Memory strategies      EDUCATION:  · Recommendations discussed with   · Patient     RECOMMENDATIONS for CONTINUED SPEECH THERAPY: YES: Anticipate need for ongoing speech therapy during this hospitalization and at next level of care. Compliance with Program/Exercises: Will assess as treatment progresses  Continuation of Skilled Services/Medical Necessity:   Patient is expected to demonstrate progress in cognitive ability and dysarthria to decrease assistance required communication, increase independence with activities of daily living and increase communication with family/caregivers.  Patient continues to require skilled intervention due to dysarthria and cognitive linguistic deficits.      SAFETY:  After treatment position/precautions:  · Supine in bed  · RN notified  · Call light within reach    Total Treatment Duration:   Time In: 9444  Time Out: [address], [address] [facility]

## 2019-12-27 NOTE — PROGRESS NOTES
12/27/19 0715   NIH Stroke Scale   Interval Other (comment)  (dual NIH with Sarahy Cardenas RN)   LOC 0   LOC Questions 0   LOC Commands 0   Best Gaze 0   Visual 0   Facial Palsy 2   Motor Right Arm 0   Motor Left Arm 4   Motor Right Leg 0   Motor Left Leg 4   Limb Ataxia 0   Sensory 0   Best Language 0   Dysarthria 1   Extinction and Inattention 0   Total 11

## 2019-12-27 NOTE — PROGRESS NOTES
Hospitalist Note     Admit Date:  2019  9:07 AM   Name:  Edilberto Dos Santos   Age:  53 y.o.  :  1975   MRN:  [id number]   PCP:  Frank Rock MD    HPI: Mr. Romina Orlando is a 52 y/o smoker with DM, HTN, who presented to ER  with L leg and arm weakness, L facial droop, dysarthria. First noted L leg weakness late night  when he woke up to go to the bathroom. He was normal when he went to bed around 1030. EMS called. Noted to have slurred speech and L facial droop as well. Code S called in ER around 9am.  MRI with acute infarcts in L cerebellar hemisphere, deep frontoparietal white matter, and R paramedian yariel. Also noted old lacunar infarcts. No large vessel occlusion on CTA, but some stenosis noted. He was started on asa, statins. Neurology consulted. An ECHO showed PFO. Plan for cardiology referral and evaluate for closure as outpatient. PT/OT suggested inpatient rehabilitation. The patient is uninsured and the only option at this moment is [facility]. Subjective:   Pt seen and examined today after breakfast. No family at bedside. Has some slurred speech, but states he is doing well. Denies new complaints. Continues to have L sided hemiparesis. Continues to await rehab placement. Unable to discharge to home safely in current state.      Objective:     Patient Vitals for the past 24 hrs:   Temp Pulse Resp BP SpO2   19 0800 98 °F (36.7 °C) 65 18 112/70 95 %   19 0400 97.8 °F (36.6 °C) 72 17 106/71 100 %   19 0000 97.4 °F (36.3 °C) 62 17 130/88 95 %   19 2000 98.7 °F (37.1 °C) 64 17 124/77 96 %   19 1600 98.8 °F (37.1 °C) (!) 54 20 173/82 99 %   19 1200 98.1 °F (36.7 °C) 93 20 104/69 93 %     Oxygen Therapy  O2 Sat (%): 95 % (19 0800)  Pulse via Oximetry: 73 beats per minute (19 1615)    Intake/Output Summary (Last 24 hours) at 2019 0838  Last data filed at 2019 0533  Gross per 24 hour   Intake 120 ml   Output    Net 120 ml Physical Exam:  General:    Well nourished. Alert and oriented. CV:   RRR. No murmur, rub, or gallop. Lungs:  Clear to auscultation bilaterally. No wheezing, rhonchi, or rales  Extremities: Warm and dry. No cyanosis or edema. Neurologic: CN II-XII intact except for mild L facial droop. Sensation intact. PERRLA.  dysarthria. No confusion. STR 1/5 LUE and LLE. Skin:     No rashes or jaundice. No wounds. Psych:  Normal mood and affect. I reviewed the labs, imaging, EKGs, telemetry, and other studies done this admission.     Data Review:   Recent Results (from the past 24 hour(s))   GLUCOSE, POC    Collection Time: 12/26/19 11:18 AM   Result Value Ref Range    Glucose (POC) 117 (H) 65 - 100 mg/dL   GLUCOSE, POC    Collection Time: 12/26/19  4:00 PM   Result Value Ref Range    Glucose (POC) 125 (H) 65 - 100 mg/dL   GLUCOSE, POC    Collection Time: 12/26/19  8:37 PM   Result Value Ref Range    Glucose (POC) 120 (H) 65 - 100 mg/dL   CBC W/O DIFF    Collection Time: 12/27/19  5:12 AM   Result Value Ref Range    WBC 7.4 4.3 - 11.1 K/uL    RBC 4.31 4.23 - 5.6 M/uL    HGB 10.9 (L) 13.6 - 17.2 g/dL    HCT 34.7 (L) 41.1 - 50.3 %    MCV 80.5 79.6 - 97.8 FL    MCH 25.3 (L) 26.1 - 32.9 PG    MCHC 31.4 31.4 - 35.0 g/dL    RDW 17.1 (H) 11.9 - 14.6 %    PLATELET 444 176 - 460 K/uL    MPV 11.3 9.4 - 12.3 FL    ABSOLUTE NRBC 0.00 0.0 - 0.2 K/uL   METABOLIC PANEL, BASIC    Collection Time: 12/27/19  5:12 AM   Result Value Ref Range    Sodium 139 136 - 145 mmol/L    Potassium 3.7 3.5 - 5.1 mmol/L    Chloride 108 (H) 98 - 107 mmol/L    CO2 25 21 - 32 mmol/L    Anion gap 6 (L) 7 - 16 mmol/L    Glucose 105 (H) 65 - 100 mg/dL    BUN 16 6 - 23 MG/DL    Creatinine 1.37 0.8 - 1.5 MG/DL    GFR est AA >60 >60 ml/min/1.73m2    GFR est non-AA 59 (L) >60 ml/min/1.73m2    Calcium 9.6 8.3 - 10.4 MG/DL   GLUCOSE, POC    Collection Time: 12/27/19  7:30 AM   Result Value Ref Range    Glucose (POC) 110 (H) 65 - 100 mg/dL       All Micro Results     None          Current Facility-Administered Medications   Medication Dose Route Frequency    insulin glargine (LANTUS) injection 10 Units  10 Units SubCUTAneous QHS    pantoprazole (PROTONIX) tablet 40 mg  40 mg Oral ACB    guaiFENesin (ROBITUSSIN) 100 mg/5 mL oral liquid 100 mg  100 mg Oral Q4H PRN    metoprolol tartrate (LOPRESSOR) tablet 100 mg  100 mg Oral BID    hydrALAZINE (APRESOLINE) 20 mg/mL injection 20 mg  20 mg IntraVENous Q4H PRN    atorvastatin (LIPITOR) tablet 80 mg  80 mg Oral QHS    amLODIPine (NORVASC) tablet 10 mg  10 mg Oral DAILY    lisinopril (PRINIVIL, ZESTRIL) tablet 40 mg  40 mg Oral DAILY    sodium chloride (NS) flush 5-40 mL  5-40 mL IntraVENous Q8H    sodium chloride (NS) flush 5-40 mL  5-40 mL IntraVENous PRN    ondansetron (ZOFRAN) injection 4 mg  4 mg IntraVENous Q4H PRN    aspirin chewable tablet 81 mg  81 mg Oral DAILY    acetaminophen (TYLENOL) tablet 650 mg  650 mg Oral Q4H PRN    polyethylene glycol (MIRALAX) packet 17 g  17 g Oral DAILY PRN    enoxaparin (LOVENOX) injection 40 mg  40 mg SubCUTAneous Q24H    insulin lispro (HUMALOG) injection   SubCUTAneous AC&HS    dextrose 40% (GLUTOSE) oral gel 1 Tube  15 g Oral PRN    glucagon (GLUCAGEN) injection 1 mg  1 mg IntraMUSCular PRN    dextrose (D50W) injection syrg 12.5-25 g  25-50 mL IntraVENous PRN       Other Studies:  No results found.   Results for orders placed or performed during the hospital encounter of 19   2D ECHO COMPLETE ADULT (TTE) W OR [address]  [phone]    Transthoracic Echocardiogram  2D, M-mode, Doppler, and Color Doppler    Patient: Princess Ta  MR #: [id number]  : 1975  Age: 53 years  Gender: Male  Study date: 12-Dec-2019  Account #: [de-identified]  Height: 66 in  Weight: 209.7 lb  BSA: 2.04 mï¾²  Status:Routine  Location: ER03  BP: 133/ 76    Allergies: NO KNOWN ALLERGENS    Sonographer:  [facility]  Group:  [id number] Cardiology  Referring Physician:  Rosendo Ivan. Mau Duong MD  Reading Physician:  Robert Taylor MD [facility]    INDICATIONS: CVA    PROCEDURE: This was a routine study. A transthoracic echocardiogram was  performed. The study included complete 2D imaging, M-mode, complete spectral  Doppler, and color Doppler. Intravenous contrast (Definity) was administered. Image quality was adequate. LEFT VENTRICLE: Size was normal. Systolic function was normal. Ejection  fraction was estimated in the range of 65 % to 70 %. There were no regional  wall motion abnormalities. There was mild concentric hypertrophy. Left  ventricular diastolic function parameters were normal. Avg E/e': 11.05.    RIGHT VENTRICLE: The size was normal. Systolic function was normal. The  tricuspid jet envelope definition was inadequate for estimation of RV   systolic  pressure. LEFT ATRIUM: Size was normal.    ATRIAL SEPTUM: Agitated saline contrast injection (bubble study) was   performed. There was a right-to-left shunt, in the baseline state. RIGHT ATRIUM: Size was normal.    SYSTEMIC VEINS: IVC: The inferior vena cava was normal in size and course. The  respirophasic change in diameter was more than 50%. AORTIC VALVE: The valve was trileaflet. Leaflets exhibited mild sclerosis. There was no evidence for stenosis. There was no insufficiency. MITRAL VALVE: Valve structure was normal. There was no evidence for stenosis. There was no regurgitation. TRICUSPID VALVE: The valve structure was normal. There was no evidence for  stenosis. There was trivial regurgitation. PULMONIC VALVE: Not well visualized. There was no evidence for stenosis. There  was no insufficiency. PERICARDIUM: There was no pericardial effusion. AORTA: The root exhibited normal size.     SUMMARY:    -  Left ventricle: Systolic function was normal. Ejection fraction was  estimated in the range of 65 % to 70 %. There were no regional wall motion  abnormalities. There was mild concentric hypertrophy. -  Atrial septum: Agitated saline contrast injection (bubble study) was  performed. There was a right-to-left shunt, in the baseline state. -  Inferior vena cava, hepatic veins: The respirophasic change in diameter   was  more than 50%. SYSTEM MEASUREMENT TABLES    2D mode  AoR Diam (2D): 2.8 cm  LA Dimension (2D): 3.5 cm  Left Atrium Systolic Volume Index; Method of Disks, Biplane; 2D mode;: 26   ml/m2  IVS/LVPW (2D): 1  IVSd (2D): 1.2 cm  LVIDd (2D): 5.2 cm  LVIDs (2D): 2.9 cm  LVOT Area (2D): 2.3 cm2  LVPWd (2D): 1.2 cm  RVIDd (2D): 2.2 cm    Tissue Doppler Imaging  LV Peak Early Thomas Tissue Rc; Lateral MA (TDI): 8.1 cm/s  LV Peak Early Thomas Tissue Rc; Medial MA (TDI): 7.6 cm/s    Unspecified Scan Mode  Peak Grad; Mean; Antegrade Flow: 14 mm[Hg]  Vmax; Antegrade Flow: 185 cm/s  LVOT Diam: 1.7 cm  MV Peak Rc/LV Peak Tissue Rc E-Wave; Lateral MA: 10.7  MV Peak Rc/LV Peak Tissue Rc E-Wave; Medial MA: 11.4    Prepared and signed by    Mulu Hernandez. Manny Platt MD [facility]  Signed 12-Dec-2019 15:46:58             Assessment and Plan:   Principal Problem:    Acute embolic stroke - continues to have L sided neglect. Appreciate physiatry consultation. Cont statin, ASA, PT/OT/ST. Needs outpatient cardiology follow-up for event monitor and PFO closure. Active Problems:    Hypertension - relatively well controlled on Norvasc and metoprolol. Type 2 diabetes mellitus - cont on Lantus and SSI      Arrhythmia - had brief wide complex tachycardia, will need event monitor as outpt per cards      Hyperlipemia - statin therapy      PFO (patent foramen ovale) - will need closure as outpatient per cardiology      Ppx: Lovenox for VTE    Code Status: FULL CODE    Disposition: patient has been medically clear for discharge but is without insurance, needs rehab/placement. Only option is [facility].  CM on board Signed:  ELAINE Mcarthur

## 2019-12-27 NOTE — PROGRESS NOTES
Problem: Self Care Deficits Care Plan (Adult)  Goal: *Acute Goals and Plan of Care (Insert Text)  Description  1. Patient will complete sitting balance edge of bed with ADL tasks/OT activity with supervision. 2. Patient will demonstrate appropriate safety awareness and protection of L UE during bed mobility and functional transfers with minimal cues. 3. Patient will complete upper body bathing/dressing with minimal assistance to improve participation with ADL. 4. Patient will complete weightbearing into the L UE with ADL tasks with minimal assistance to improve ability to use as a functional assist during ADL tasks. 5. Patient will participation in 8 minutes of therapeutic exercises with moderate assistance to improve strength in the L UE for ADL/functional transfers. 6. Patient will attempt transfer to the chair/BSC within 3 visits to demonstrate advancement with functional transfers. Timeframe: 7 visits   Outcome: Progressing Towards Goal     OCCUPATIONAL THERAPY: Daily Note and AM    12/27/2019  INPATIENT: OT Visit Days: 3  Payor: MEDICAID PENDING / Plan: Mendez Johnson PENDING / Product Type: Medicaid /      NAME/AGE/GENDER: Manjeet Clifford is a 53 y.o. male   PRIMARY DIAGNOSIS:  Acute ischemic stroke ([address] [I63.9]  Cerebrovascular accident (CVA) due to embolism ([address] [I63.9] Acute ischemic stroke ([address] Acute ischemic stroke ([address]       ICD-10: Treatment Diagnosis:    · Generalized Muscle Weakness (M62.81)  · Other lack of cordination (R27.8)  · Hemiplegia and hemiparesis following cerebral infarction affecting   · left non-dominant side (I69.354)  · Abnormal posture (R29.3)   Precautions/Allergies:     Patient has no known allergies. ASSESSMENT:     Mr. Davey Stein presents to the hospital with L sided weakness and acute ischemic CVA. MRI revealed acute to subacute L cerebellar and R paramedian yariel infarcts.    12/27/2019 Pt presents in supine upon arrival. Pt shook head yes to participating in therapy. Pt transferred from supine to sit with mod a x's 2. Pt sat edge of bed approximately 15 minutes while completing NDT listed in the following grid. Pt's balance continues to wax and wane between fair to poor. Pt stood with mod a x's 2, 2 reps with a gait belt and then completed SPT with mod a x's 2 from bed to the chair. Pt left up in the chair with posey on and belongings in reach. Good effort. Continue POC. This section established at most recent assessment   PROBLEM LIST (Impairments causing functional limitations):  1. Decreased Strength  2. Decreased ADL/Functional Activities  3. Decreased Transfer Abilities  4. Decreased Ambulation Ability/Technique  5. Decreased Balance  6. Decreased Activity Tolerance  7. Increased Fatigue  8. Decreased Flexibility/Joint Mobility  9. Decreased Knowledge of Precautions  10. Decreased Iron Belt with Home Exercise Program  11. Decreased Cognition   INTERVENTIONS PLANNED: (Benefits and precautions of occupational therapy have been discussed with the patient.)  1. Activities of daily living training  2. Adaptive equipment training  3. Balance training  4. Clothing management  5. Cognitive training  6. Donning&doffing training  7. Keanu tech training  8. Neuromuscular re-eduation  9. Therapeutic activity  10. Therapeutic exercise     TREATMENT PLAN: Frequency/Duration: Follow patient 3 times per week to address above goals. Rehabilitation Potential For Stated Goals: Excellent     REHAB RECOMMENDATIONS (at time of discharge pending progress):    Placement: It is my opinion, based on this patient's performance to date, that Mr. Ellis Nair may benefit from intensive therapy at an [address] [place] [address] after discharge due to potential to make ongoing and sustainable functional improvement that is of practical value. .  Equipment:    TBD               OCCUPATIONAL PROFILE AND HISTORY:   History of Present Injury/Illness (Reason for Referral):  See H&P  Past Medical History/Comorbidities:   Mr. Karlos Alvarenga  has a past medical history of Acute ischemic stroke ([address] (12/12/2019), Acute pancreatitis (11/19/2014), Cerebrovascular accident (CVA) due to embolism ([address] (12/12/2019), Diabetes ([address] (2002), Diabetes ([address] Diabetes mellitus, and Hypertension. He also has no past medical history of Arthritis, Asthma, Autoimmune disease ([address] CAD (coronary artery disease), Cancer ([address] Chronic kidney disease, COPD, Dementia, Dementia ([address] Heart failure ([address] Ill-defined condition, Infectious disease, Liver disease, Other ill-defined conditions(799.89), Psychiatric disorder, PUD (peptic ulcer disease), Seizures ([address] or Sleep disorder. Mr. Karlos Alvarenga  has a past surgical history that includes hx hernia repair and hx orthopaedic. Social History/Living Environment:   Home Environment: Apartment  # Steps to Enter: 12  Rails to Enter: Yes  [employer] : Bilateral  One/Two Story Residence: One story  Living Alone: No  Support Systems: Spouse/Significant Other/Partner  Patient Expects to be Discharged to[de-identified] Unknown  Current DME Used/Available at Home: None  Tub or Shower Type: Tub/Shower combination  Prior Level of Function/Work/Activity:  Pt lives at home with his wife. Pt is typically independent with ADL/functional mobility. Pt does not drive. Pt was working part-time at [email or web address]. Personal Factors:          Age:  53 y.o. Past/Current Experience:  CVA with flaccid L side        Other factors that influence how disability is experienced by the patient:  multiple co-morbidities    Number of Personal Factors/Comorbidities that affect the Plan of Care: Extensive review of physical, cognitive, and psychosocial performance (3+):  HIGH COMPLEXITY   ASSESSMENT OF OCCUPATIONAL PERFORMANCE[de-identified]   Activities of Daily Living:   Basic ADLs (From Assessment) Complex ADLs (From Assessment)   Feeding: Stand-by assistance  Oral Facial Hygiene/Grooming:  Moderate assistance  Bathing: Maximum assistance  Upper Body Dressing: Maximum assistance  Lower Body Dressing: Total assistance  Toileting: Total assistance Instrumental ADL  Meal Preparation: Total assistance  Homemaking: Total assistance   Grooming/Bathing/Dressing Activities of Daily Living                             Bed/Mat Mobility  Rolling: Moderate assistance  Supine to Sit: Moderate assistance;Assist x2  Sit to Stand: Moderate assistance;Assist x2  Stand to Sit: Moderate assistance;Assist x2  Scooting: Total assistance     Most Recent Physical Functioning:   Gross Assessment:                  Posture:     Balance:  Sitting: Impaired  Sitting - Static: Fair (occasional)  Sitting - Dynamic: Poor (constant support)  Standing: Impaired  Standing - Static: Poor  Standing - Dynamic : Poor Bed Mobility:  Rolling: Moderate assistance  Supine to Sit: Moderate assistance;Assist x2  Scooting: Total assistance  Wheelchair Mobility:     Transfers:  Sit to Stand: Moderate assistance;Assist x2  Stand to Sit: Moderate assistance;Assist x2  Stand Pivot Transfers: Moderate assistance;Maximum assistance;Assist x2            Patient Vitals for the past 6 hrs:   BP BP Patient Position SpO2 Pulse   12/27/19 0800 112/70 At rest 95 % 65       Mental Status  Neurologic State: Alert  Orientation Level: Oriented to person, Oriented to place, Oriented to situation  Cognition: Follows commands  Perception: Cues to attend to left side of body, Cues to maintain midline in standing, Tactile, Verbal, Visual  Perseveration: No perseveration noted  Safety/Judgement: Fall prevention                          Physical Skills Involved:  1. Range of Motion  2. Balance  3. Strength  4. Activity Tolerance  5. Fine Motor Control  6. Gross Motor Control Cognitive Skills Affected (resulting in the inability to perform in a timely and safe manner):  1. Perception  2. Expression Psychosocial Skills Affected:  1. Habits/Routines  2.  Self-Awareness   Number of elements that affect the Plan of Care: 5+:  HIGH COMPLEXITY   CLINICAL DECISION MAKIN[address] [place] [address] AM-PAC 6 Clicks   Daily Activity Inpatient Short Form  How much help from another person does the patient currently need. .. Total A Lot A Little None   1. Putting on and taking off regular lower body clothing? [x] 1   [] 2   [] 3   [] 4   2. Bathing (including washing, rinsing, drying)? [] 1   [x] 2   [] 3   [] 4   3. Toileting, which includes using toilet, bedpan or urinal?   [x] 1   [] 2   [] 3   [] 4   4. Putting on and taking off regular upper body clothing? [] 1   [x] 2   [] 3   [] 4   5. Taking care of personal grooming such as brushing teeth? [] 1   [x] 2   [] 3   [] 4   6. Eating meals? [] 1   [] 2   [x] 3   [] 4   © , Trustees of [address] [place] [address], under license to [employer]. All rights reserved      Score:  Initial: 11 Most Recent: X (Date: -- )    Interpretation of Tool:  Represents activities that are increasingly more difficult (i.e. Bed mobility, Transfers, Gait). Medical Necessity:     · Patient demonstrates   · good and excellent  ·  rehab potential due to higher previous functional level. Reason for Services/Other Comments:  · Patient continues to require skilled intervention due to   · Decreased independence with ADL/functional transfers that impacts overall quality of life. · .   Use of outcome tool(s) and clinical judgement create a POC that gives a: MODERATE COMPLEXITY         TREATMENT:   (In addition to Assessment/Re-Assessment sessions the following treatments were rendered)     Pre-treatment Symptoms/Complaints:    Pain: Initial:   Pain Intensity 1: 0  Post Session:  0       Neuromuscular Re-education: (20 minutes):  Exercise/activities per grid below to improve balance, coordination, kinesthetic sense, posture and proprioception. Required maximal visual, verbal and manual cues to promote motor control of right, upper extremity(s), trunk.   Re-Education Training  Re-Education Training: Yes   LLE Re-Education  Other Activities: lateral weight bearing through his L UE to increase biofeedback and proprioceptionTrunk Re-Education  Muscle Facilitation: head and trunk extension  Muscle Inhibition: trunk flexion and left lateral lean  Integration Activities: R UE to increase midline    Therapeutic Activity: (13 minutes): Therapeutic activities including Bed transfers, Chair transfers and static standing and SPT to improve mobility, strength and balance. Required moderate assist to promote static and dynamic balance in standing. Date:  12/15 Date:   Date:     Activity/Exercise Parameters Parameters Parameters   Shifting weight side to side and weightbearing into L UE 10 reps      Trunk flexion   5 reps      Trunk extension to upright posture 5 reps      Dynamic reaching to the L 15 reps      Identifying and visually attending to L side 6 reps                      Braces/Orthotics/Lines/Etc:   ·  None  Treatment/Session Assessment:    · Response to Treatment:  Pt demonstrated good participation. · Interdisciplinary Collaboration:   o Certified Occupational Therapy Assistant  o Registered Nurse  o Rehabilitation Attendant  · After treatment position/precautions:   o Up in chair  o Bed alarm/tab alert on  o Bed/Chair-wheels locked  o Call light within reach  o RN notified   · Compliance with Program/Exercises: Will assess as treatment progresses. · Recommendations/Intent for next treatment session: \"Next visit will focus on advancements to more challenging activities and reduction in assistance provided\".   Total Treatment Duration:  OT Patient Time In/Time Out  Time In: 0935  Time Out: [facility]

## 2019-12-28 LAB
GLUCOSE BLD STRIP.AUTO-MCNC: 109 MG/DL (ref 65–100)
GLUCOSE BLD STRIP.AUTO-MCNC: 126 MG/DL (ref 65–100)
GLUCOSE BLD STRIP.AUTO-MCNC: 166 MG/DL (ref 65–100)
GLUCOSE BLD STRIP.AUTO-MCNC: 99 MG/DL (ref 65–100)

## 2019-12-28 PROCEDURE — 65660000000 HC RM CCU STEPDOWN

## 2019-12-28 PROCEDURE — 74011250637 HC RX REV CODE- 250/637: Performed by: PHYSICIAN ASSISTANT

## 2019-12-28 PROCEDURE — 74011250637 HC RX REV CODE- 250/637: Performed by: INTERNAL MEDICINE

## 2019-12-28 PROCEDURE — 74011250636 HC RX REV CODE- 250/636: Performed by: INTERNAL MEDICINE

## 2019-12-28 PROCEDURE — 65270000029 HC RM PRIVATE

## 2019-12-28 PROCEDURE — 82962 GLUCOSE BLOOD TEST: CPT

## 2019-12-28 PROCEDURE — 74011636637 HC RX REV CODE- 636/637: Performed by: INTERNAL MEDICINE

## 2019-12-28 PROCEDURE — 74011250637 HC RX REV CODE- 250/637: Performed by: HOSPITALIST

## 2019-12-28 RX ORDER — METOPROLOL TARTRATE 25 MG/1
25 TABLET, FILM COATED ORAL 2 TIMES DAILY
Status: DISCONTINUED | OUTPATIENT
Start: 2019-12-28 | End: 2019-12-31

## 2019-12-28 RX ADMIN — Medication 10 ML: at 05:56

## 2019-12-28 RX ADMIN — LISINOPRIL 40 MG: 20 TABLET ORAL at 08:54

## 2019-12-28 RX ADMIN — INSULIN GLARGINE 10 UNITS: 100 INJECTION, SOLUTION SUBCUTANEOUS at 22:01

## 2019-12-28 RX ADMIN — Medication 10 ML: at 15:00

## 2019-12-28 RX ADMIN — ENOXAPARIN SODIUM 40 MG: 40 INJECTION SUBCUTANEOUS at 15:00

## 2019-12-28 RX ADMIN — AMLODIPINE BESYLATE 10 MG: 10 TABLET ORAL at 08:54

## 2019-12-28 RX ADMIN — PANTOPRAZOLE SODIUM 40 MG: 40 TABLET, DELAYED RELEASE ORAL at 05:56

## 2019-12-28 RX ADMIN — INSULIN LISPRO 2 UNITS: 100 INJECTION, SOLUTION INTRAVENOUS; SUBCUTANEOUS at 17:19

## 2019-12-28 RX ADMIN — METOPROLOL TARTRATE 25 MG: 25 TABLET ORAL at 17:18

## 2019-12-28 RX ADMIN — ATORVASTATIN CALCIUM 80 MG: 80 TABLET, FILM COATED ORAL at 21:48

## 2019-12-28 RX ADMIN — METOPROLOL TARTRATE 100 MG: 50 TABLET, FILM COATED ORAL at 08:54

## 2019-12-28 RX ADMIN — ASPIRIN 81 MG 81 MG: 81 TABLET ORAL at 08:54

## 2019-12-28 RX ADMIN — Medication 10 ML: at 21:50

## 2019-12-28 NOTE — PROGRESS NOTES
12/27/19 1904   NIH Stroke Scale   Interval Other (comment)  (Dual NIH with Isai Adorno RN)   LOC 0   LOC Questions 0   LOC Commands 0   Best Gaze 0   Visual 0   Facial Palsy 2   Motor Right Arm 0   Motor Left Arm 4   Motor Right Leg 0   Motor Left Leg 4   Limb Ataxia 0   Sensory 0   Best Language 0   Dysarthria 1   Extinction and Inattention 0   Total 11

## 2019-12-28 NOTE — PROGRESS NOTES
12/28/19 0702   NIH Stroke Scale   Interval Other (comment)  (dual NIH with Kareem Barcenas RN)   LOC 0   LOC Questions 0   LOC Commands 0   Best Gaze 0   Visual 0   Facial Palsy 2   Motor Right Arm 0   Motor Left Arm 4   Motor Right Leg 0   Motor Left Leg 4   Limb Ataxia 0   Sensory 0   Best Language 0   Dysarthria 1   Extinction and Inattention 0   Total 11

## 2019-12-28 NOTE — PROGRESS NOTES
12/28/19 1844   NIH Stroke Scale   Interval Other (comment)  (Shift change with Kaye Steele RN)   LOC 0   LOC Questions 0   LOC Commands 0   Best Gaze 0   Visual 0   Facial Palsy 2   Motor Right Arm 0   Motor Left Arm 4   Motor Right Leg 0   Motor Left Leg 4   Limb Ataxia 0   Sensory 0   Best Language 0   Dysarthria 1   Extinction and Inattention 0   Total 11

## 2019-12-28 NOTE — PROGRESS NOTES
Hospitalist Note     Admit Date:  2019  9:07 AM   Name:  Calin Yen   Age:  53 y.o.  :  1975   MRN:  [id number]   PCP:  Keyana Dutton MD    HPI: Mr. Sherine Joshi is a 52 y/o smoker with DM, HTN, who presented to ER  with L leg and arm weakness, L facial droop, dysarthria. First noted L leg weakness late night  when he woke up to go to the bathroom. He was normal when he went to bed around 1030. EMS called. Noted to have slurred speech and L facial droop as well. Code S called in ER around 9am.  MRI with acute infarcts in L cerebellar hemisphere, deep frontoparietal white matter, and R paramedian yariel. Also noted old lacunar infarcts. No large vessel occlusion on CTA, but some stenosis noted. He was started on asa, statins. Neurology consulted. An ECHO showed PFO. Plan for cardiology referral and evaluate for closure as outpatient. PT/OT suggested inpatient rehabilitation. The patient is uninsured and the only option at this moment is [facility]. Subjective:   Pt seen and examined this morning. Continues to have no new complaints. L side remains very weak. Objective:     Patient Vitals for the past 24 hrs:   Temp Pulse Resp BP SpO2   19 0400 98 °F (36.7 °C) 70 18 150/74 93 %   19 0000 98.3 °F (36.8 °C) (!) 50 18 118/77 94 %   19 2000 98.2 °F (36.8 °C) (!) 58 20 121/72 94 %   19 1736  (!) 57  129/63    19 1600 98 °F (36.7 °C) (!) 56 18 111/75 94 %   19 1200 98.2 °F (36.8 °C) (!) 53 18 120/77 95 %   19 0800 98 °F (36.7 °C) 65 18 112/70 95 %     Oxygen Therapy  O2 Sat (%): 93 % (19 0400)  Pulse via Oximetry: 73 beats per minute (19 1615)  No intake or output data in the 24 hours ending 19 5025      Physical Exam:  General:    Well nourished. Alert and oriented. CV:   RRR. No murmur, rub, or gallop. Lungs:  Clear to auscultation bilaterally. No wheezing, rhonchi, or rales  Extremities: Warm and dry.   No cyanosis or edema. Neurologic: CN II-XII intact except for mild L facial droop. Sensation intact. PERRLA.  dysarthria. No confusion. STR 1/5 LUE and LLE. Dysarthria noted. Skin:     No rashes or jaundice. No wounds. Psych:  Normal mood and affect. I reviewed the labs, imaging, EKGs, telemetry, and other studies done this admission.     Data Review:   Recent Results (from the past 24 hour(s))   GLUCOSE, POC    Collection Time: 12/27/19 11:31 AM   Result Value Ref Range    Glucose (POC) 154 (H) 65 - 100 mg/dL   GLUCOSE, POC    Collection Time: 12/27/19  4:21 PM   Result Value Ref Range    Glucose (POC) 95 65 - 100 mg/dL   GLUCOSE, POC    Collection Time: 12/27/19  9:01 PM   Result Value Ref Range    Glucose (POC) 123 (H) 65 - 100 mg/dL   GLUCOSE, POC    Collection Time: 12/28/19  7:05 AM   Result Value Ref Range    Glucose (POC) 109 (H) 65 - 100 mg/dL       All Micro Results     None          Current Facility-Administered Medications   Medication Dose Route Frequency    insulin glargine (LANTUS) injection 10 Units  10 Units SubCUTAneous QHS    pantoprazole (PROTONIX) tablet 40 mg  40 mg Oral ACB    guaiFENesin (ROBITUSSIN) 100 mg/5 mL oral liquid 100 mg  100 mg Oral Q4H PRN    metoprolol tartrate (LOPRESSOR) tablet 100 mg  100 mg Oral BID    hydrALAZINE (APRESOLINE) 20 mg/mL injection 20 mg  20 mg IntraVENous Q4H PRN    atorvastatin (LIPITOR) tablet 80 mg  80 mg Oral QHS    amLODIPine (NORVASC) tablet 10 mg  10 mg Oral DAILY    lisinopril (PRINIVIL, ZESTRIL) tablet 40 mg  40 mg Oral DAILY    sodium chloride (NS) flush 5-40 mL  5-40 mL IntraVENous Q8H    sodium chloride (NS) flush 5-40 mL  5-40 mL IntraVENous PRN    ondansetron (ZOFRAN) injection 4 mg  4 mg IntraVENous Q4H PRN    aspirin chewable tablet 81 mg  81 mg Oral DAILY    acetaminophen (TYLENOL) tablet 650 mg  650 mg Oral Q4H PRN    polyethylene glycol (MIRALAX) packet 17 g  17 g Oral DAILY PRN    enoxaparin (LOVENOX) injection 40 mg  40 mg SubCUTAneous Q24H    insulin lispro (HUMALOG) injection   SubCUTAneous AC&HS    dextrose 40% (GLUTOSE) oral gel 1 Tube  15 g Oral PRN    glucagon (GLUCAGEN) injection 1 mg  1 mg IntraMUSCular PRN    dextrose (D50W) injection syrg 12.5-25 g  25-50 mL IntraVENous PRN       Other Studies:  No results found. Results for orders placed or performed during the hospital encounter of 19   2D ECHO COMPLETE ADULT (TTE) [address]  [address] [facility], [address] [facility]  [phone]    Transthoracic Echocardiogram  2D, M-mode, Doppler, and Color Doppler    Patient: Christina Johnson  MR #: [id number]  : 1975  Age: [address] years  Gender: Male  Study date: 12-Dec-2019  Account #: [de-identified]  Height: 66 in  Weight: 209.7 lb  BSA: 2.04 mï¾²  Status:Routine  Location: ER03  BP: 133/ 76    Allergies: NO KNOWN ALLERGENS    Sonographer:  LUZ Adrian  Group:  [facility]  Referring Physician:  Vickie Nino. Toshia Flaherty MD  Reading Physician:  Lavinia Hylton MD [facility]    INDICATIONS: CVA    PROCEDURE: This was a routine study. A transthoracic echocardiogram was  performed. The study included complete 2D imaging, M-mode, complete spectral  Doppler, and color Doppler. Intravenous contrast (Definity) was administered. Image quality was adequate. LEFT VENTRICLE: Size was normal. Systolic function was normal. Ejection  fraction was estimated in the range of 65 % to 70 %. There were no regional  wall motion abnormalities. There was mild concentric hypertrophy. Left  ventricular diastolic function parameters were normal. Avg E/e': 11.05.    RIGHT VENTRICLE: The size was normal. Systolic function was normal. The  tricuspid jet envelope definition was inadequate for estimation of RV   systolic  pressure. LEFT ATRIUM: Size was normal.    ATRIAL SEPTUM: Agitated saline contrast injection (bubble study) was   performed.   There was a right-to-left shunt, in the baseline state. RIGHT ATRIUM: Size was normal.    SYSTEMIC VEINS: IVC: The inferior vena cava was normal in size and course. The  respirophasic change in diameter was more than 50%. AORTIC VALVE: The valve was trileaflet. Leaflets exhibited mild sclerosis. There was no evidence for stenosis. There was no insufficiency. MITRAL VALVE: Valve structure was normal. There was no evidence for stenosis. There was no regurgitation. TRICUSPID VALVE: The valve structure was normal. There was no evidence for  stenosis. There was trivial regurgitation. PULMONIC VALVE: Not well visualized. There was no evidence for stenosis. There  was no insufficiency. PERICARDIUM: There was no pericardial effusion. AORTA: The root exhibited normal size. SUMMARY:    -  Left ventricle: Systolic function was normal. Ejection fraction was  estimated in the range of 65 % to 70 %. There were no regional wall motion  abnormalities. There was mild concentric hypertrophy. -  Atrial septum: Agitated saline contrast injection (bubble study) was  performed. There was a right-to-left shunt, in the baseline state. -  Inferior vena cava, hepatic veins: The respirophasic change in diameter   was  more than 50%. SYSTEM MEASUREMENT TABLES    2D mode  AoR Diam (2D): 2.8 cm  LA Dimension (2D): 3.5 cm  Left Atrium Systolic Volume Index; Method of Disks, Biplane; 2D mode;: 26   ml/m2  IVS/LVPW (2D): 1  IVSd (2D): 1.2 cm  LVIDd (2D): 5.2 cm  LVIDs (2D): 2.9 cm  LVOT Area (2D): 2.3 cm2  LVPWd (2D): 1.2 cm  RVIDd (2D): 2.2 cm    Tissue Doppler Imaging  LV Peak Early Thomas Tissue Rc; Lateral MA (TDI): 8.1 cm/s  LV Peak Early Thomas Tissue Rc; Medial MA (TDI): 7.6 cm/s    Unspecified Scan Mode  Peak Grad; Mean; Antegrade Flow: 14 mm[Hg]  Vmax;  Antegrade Flow: 185 cm/s  LVOT Diam: 1.7 cm  MV Peak Rc/LV Peak Tissue Rc E-Wave; Lateral MA: 10.7  MV Peak Rc/LV Peak Tissue Rc E-Wave; Medial MA: 11.4    Prepared and signed by    Soheila Palma. [address], MD [facility]  Signed 12-Dec-2019 15:46:58             Assessment and Plan:   Principal Problem:    Acute embolic stroke - continues to have L sided neglect. Appreciate physiatry consultation. Cont statin, ASA, PT/OT/ST. Needs outpatient cardiology follow-up for event monitor and PFO closure. Active Problems:    Hypertension - relatively well controlled on Norvasc and metoprolol. Type 2 diabetes mellitus - cont on Lantus and SSI      Arrhythmia - had brief wide complex tachycardia, will need event monitor as outpt per cards      Hyperlipemia - statin therapy      PFO (patent foramen ovale) - will need closure as outpatient per cardiology      Ppx: Lovenox for VTE    Code Status: FULL CODE    Disposition: patient has been medically clear for discharge but is without insurance, needs rehab/placement. Only option is [facility]. CM on board. Awaiting Medicaid approval. Per previous notes, does have plans for first floor apartment once able to discharge to home.      Signed:  EALINE Wiley

## 2019-12-28 NOTE — PROGRESS NOTES
Visit with patient to build rapport with .   Calm  Encouraged with presence and words of hope Leopoldo Antu,  Staff   C: [phone]  /  [email or web address]

## 2019-12-28 NOTE — PROGRESS NOTES
Problem: Falls - Risk of  Goal: *Absence of Falls  Description  Document Andrew Wright Fall Risk and appropriate interventions in the flowsheet.   Outcome: Progressing Towards Goal  Note: Fall Risk Interventions:  Mobility Interventions: Bed/chair exit alarm, Communicate number of staff needed for ambulation/transfer, OT consult for ADLs, Patient to call before getting OOB, PT Consult for mobility concerns, Strengthening exercises (ROM-active/passive), PT Consult for assist device competence, Utilize walker, cane, or other assistive device    Mentation Interventions: Bed/chair exit alarm, Adequate sleep, hydration, pain control    Medication Interventions: Assess postural VS orthostatic hypotension, Bed/chair exit alarm, Patient to call before getting OOB, Teach patient to arise slowly    Elimination Interventions: Bed/chair exit alarm, Call light in reach, Patient to call for help with toileting needs, Stay With Me (per policy), Toilet paper/wipes in reach, Toileting schedule/hourly rounds    History of Falls Interventions: Bed/chair exit alarm, Door open when patient unattended         Problem: Patient Education: Go to Patient Education Activity  Goal: Patient/Family Education  Outcome: Progressing Towards Goal

## 2019-12-29 LAB
GLUCOSE BLD STRIP.AUTO-MCNC: 117 MG/DL (ref 65–100)
GLUCOSE BLD STRIP.AUTO-MCNC: 125 MG/DL (ref 65–100)
GLUCOSE BLD STRIP.AUTO-MCNC: 126 MG/DL (ref 65–100)
GLUCOSE BLD STRIP.AUTO-MCNC: 210 MG/DL (ref 65–100)

## 2019-12-29 PROCEDURE — 82962 GLUCOSE BLOOD TEST: CPT

## 2019-12-29 PROCEDURE — 74011636637 HC RX REV CODE- 636/637: Performed by: INTERNAL MEDICINE

## 2019-12-29 PROCEDURE — 65660000000 HC RM CCU STEPDOWN

## 2019-12-29 PROCEDURE — 74011250637 HC RX REV CODE- 250/637: Performed by: INTERNAL MEDICINE

## 2019-12-29 PROCEDURE — 65270000029 HC RM PRIVATE

## 2019-12-29 PROCEDURE — 74011250636 HC RX REV CODE- 250/636: Performed by: INTERNAL MEDICINE

## 2019-12-29 PROCEDURE — 74011250637 HC RX REV CODE- 250/637: Performed by: PHYSICIAN ASSISTANT

## 2019-12-29 RX ORDER — POLYETHYLENE GLYCOL 3350 17 G/17G
17 POWDER, FOR SOLUTION ORAL DAILY PRN
Status: DISCONTINUED | OUTPATIENT
Start: 2019-12-29 | End: 2020-06-10 | Stop reason: HOSPADM

## 2019-12-29 RX ADMIN — INSULIN LISPRO 4 UNITS: 100 INJECTION, SOLUTION INTRAVENOUS; SUBCUTANEOUS at 08:23

## 2019-12-29 RX ADMIN — METOPROLOL TARTRATE 25 MG: 25 TABLET ORAL at 08:23

## 2019-12-29 RX ADMIN — POLYETHYLENE GLYCOL 3350 17 G: 17 POWDER, FOR SOLUTION ORAL at 09:16

## 2019-12-29 RX ADMIN — METOPROLOL TARTRATE 25 MG: 25 TABLET ORAL at 18:05

## 2019-12-29 RX ADMIN — AMLODIPINE BESYLATE 10 MG: 10 TABLET ORAL at 08:23

## 2019-12-29 RX ADMIN — Medication 10 ML: at 21:44

## 2019-12-29 RX ADMIN — Medication 10 ML: at 06:09

## 2019-12-29 RX ADMIN — INSULIN GLARGINE 10 UNITS: 100 INJECTION, SOLUTION SUBCUTANEOUS at 21:44

## 2019-12-29 RX ADMIN — ATORVASTATIN CALCIUM 80 MG: 80 TABLET, FILM COATED ORAL at 21:44

## 2019-12-29 RX ADMIN — LISINOPRIL 40 MG: 20 TABLET ORAL at 08:23

## 2019-12-29 RX ADMIN — ACETAMINOPHEN 650 MG: 325 TABLET, FILM COATED ORAL at 18:05

## 2019-12-29 RX ADMIN — Medication 10 ML: at 14:45

## 2019-12-29 RX ADMIN — ASPIRIN 81 MG 81 MG: 81 TABLET ORAL at 08:23

## 2019-12-29 RX ADMIN — PANTOPRAZOLE SODIUM 40 MG: 40 TABLET, DELAYED RELEASE ORAL at 06:09

## 2019-12-29 RX ADMIN — ENOXAPARIN SODIUM 40 MG: 40 INJECTION SUBCUTANEOUS at 14:45

## 2019-12-29 NOTE — PROGRESS NOTES
12/29/19 0700   NIH Stroke Scale   Interval Other (comment)  (Dual NIH with Ezekiel Ear RN)   LOC 0   LOC Questions 0   LOC Commands 0   Best Gaze 0   Visual 0   Facial Palsy 2   Motor Right Arm 0   Motor Left Arm 4   Motor Right Leg 0   Motor Left Leg 4   Limb Ataxia 0   Sensory 0   Best Language 0   Dysarthria 1   Extinction and Inattention 0   Total 11

## 2019-12-29 NOTE — PROGRESS NOTES
Patient was calm  No family present    Room was dark - calming     Provided supportive presence  Patient was thankful    Leopoldo Antu, staff [address]rell [address], [address] [facility]  /   [email or web address]

## 2019-12-29 NOTE — PROGRESS NOTES
Problem: Falls - Risk of  Goal: *Absence of Falls  Description  Document Shanon Gunnarelise Fall Risk and appropriate interventions in the flowsheet.   Outcome: Progressing Towards Goal  Note: Fall Risk Interventions:  Mobility Interventions: Bed/chair exit alarm, Communicate number of staff needed for ambulation/transfer, OT consult for ADLs, Patient to call before getting OOB, PT Consult for assist device competence, Utilize walker, cane, or other assistive device, Strengthening exercises (ROM-active/passive), PT Consult for mobility concerns    Mentation Interventions: Adequate sleep, hydration, pain control, Bed/chair exit alarm, Door open when patient unattended, Increase mobility, More frequent rounding, Reorient patient, Toileting rounds, Update white board    Medication Interventions: Assess postural VS orthostatic hypotension, Bed/chair exit alarm, Patient to call before getting OOB, Teach patient to arise slowly    Elimination Interventions: Bed/chair exit alarm, Call light in reach, Patient to call for help with toileting needs, Toilet paper/wipes in reach, Toileting schedule/hourly rounds, Stay With Me (per policy)    History of Falls Interventions: Bed/chair exit alarm, Door open when patient unattended         Problem: Patient Education: Go to Patient Education Activity  Goal: Patient/Family Education  Outcome: Progressing Towards Goal

## 2019-12-29 NOTE — PROGRESS NOTES
Hospitalist Note     Admit Date:  2019  9:07 AM   Name:  Daina Pinzon   Age:  53 y.o.  :  1975   MRN:  [id number]   PCP:  Juan Soares MD    HPI: Mr. Teresa Christine is a 54 y/o smoker with DM, HTN, who presented to ER  with L leg and arm weakness, L facial droop, dysarthria. First noted L leg weakness late night  when he woke up to go to the bathroom. He was normal when he went to bed around 1030. EMS called. Noted to have slurred speech and L facial droop as well. Code S called in ER around 9am.  MRI with acute infarcts in L cerebellar hemisphere, deep frontoparietal white matter, and R paramedian yariel. Also noted old lacunar infarcts. No large vessel occlusion on CTA, but some stenosis noted. He was started on asa, statins. Neurology consulted. An ECHO showed PFO. Plan for cardiology referral and evaluate for closure as outpatient. PT/OT suggested inpatient rehabilitation. The patient is uninsured and the only option at this moment is [facility]. Subjective:   Pt seen and examined this morning. Endorses no complaints. Laying in bed watching TV. Objective:     Patient Vitals for the past 24 hrs:   Temp Pulse Resp BP SpO2   19 0800 97.7 °F (36.5 °C) 72 18 130/78 93 %   19 0400 97.5 °F (36.4 °C) (!) 59 18 133/80 97 %   19 0000 98.1 °F (36.7 °C) 63 16 128/84 95 %   19 2000 98.6 °F (37 °C) (!) 56 18 122/66 95 %   19 1600 97.5 °F (36.4 °C) 62 18 134/77    19 1200 97.6 °F (36.4 °C) (!) 51 18 137/76 96 %     Oxygen Therapy  O2 Sat (%): 93 % (19 0800)  Pulse via Oximetry: 73 beats per minute (19 1615)  No intake or output data in the 24 hours ending 19 0900      Physical Exam:  General:    Well nourished. Alert and oriented. CV:   RRR. No murmur, rub, or gallop. Lungs:  Clear to auscultation bilaterally. No wheezing, rhonchi, or rales  Extremities: Warm and dry. No cyanosis or edema.   Neurologic: CN II-XII intact except for mild L facial droop. Sensation intact. PERRLA.  dysarthria. No confusion. STR 1/5 LUE and LLE. Dysarthria noted. Skin:     No rashes or jaundice. No wounds. Psych:  Normal mood and affect. I reviewed the labs, imaging, EKGs, telemetry, and other studies done this admission.     Data Review:   Recent Results (from the past 24 hour(s))   GLUCOSE, POC    Collection Time: 12/28/19 10:39 AM   Result Value Ref Range    Glucose (POC) 126 (H) 65 - 100 mg/dL   GLUCOSE, POC    Collection Time: 12/28/19  4:37 PM   Result Value Ref Range    Glucose (POC) 166 (H) 65 - 100 mg/dL   GLUCOSE, POC    Collection Time: 12/28/19  9:58 PM   Result Value Ref Range    Glucose (POC) 99 65 - 100 mg/dL   GLUCOSE, POC    Collection Time: 12/29/19  8:01 AM   Result Value Ref Range    Glucose (POC) 210 (H) 65 - 100 mg/dL       All Micro Results     None          Current Facility-Administered Medications   Medication Dose Route Frequency    metoprolol tartrate (LOPRESSOR) tablet 25 mg  25 mg Oral BID    insulin glargine (LANTUS) injection 10 Units  10 Units SubCUTAneous QHS    pantoprazole (PROTONIX) tablet 40 mg  40 mg Oral ACB    guaiFENesin (ROBITUSSIN) 100 mg/5 mL oral liquid 100 mg  100 mg Oral Q4H PRN    hydrALAZINE (APRESOLINE) 20 mg/mL injection 20 mg  20 mg IntraVENous Q4H PRN    atorvastatin (LIPITOR) tablet 80 mg  80 mg Oral QHS    amLODIPine (NORVASC) tablet 10 mg  10 mg Oral DAILY    lisinopril (PRINIVIL, ZESTRIL) tablet 40 mg  40 mg Oral DAILY    sodium chloride (NS) flush 5-40 mL  5-40 mL IntraVENous Q8H    sodium chloride (NS) flush 5-40 mL  5-40 mL IntraVENous PRN    ondansetron (ZOFRAN) injection 4 mg  4 mg IntraVENous Q4H PRN    aspirin chewable tablet 81 mg  81 mg Oral DAILY    acetaminophen (TYLENOL) tablet 650 mg  650 mg Oral Q4H PRN    polyethylene glycol (MIRALAX) packet 17 g  17 g Oral DAILY PRN    enoxaparin (LOVENOX) injection 40 mg  40 mg SubCUTAneous Q24H    insulin lispro (HUMALOG) injection SubCUTAneous AC&HS    dextrose 40% (GLUTOSE) oral gel 1 Tube  15 g Oral PRN    glucagon (GLUCAGEN) injection 1 mg  1 mg IntraMUSCular PRN    dextrose (D50W) injection syrg 12.5-25 g  25-50 mL IntraVENous PRN       Other Studies:  No results found. Results for orders placed or performed during the hospital encounter of 19   2D ECHO COMPLETE ADULT (TTE) [address] [facility]  [address] [facility], [address] [facility]  [phone]    Transthoracic Echocardiogram  2D, M-mode, Doppler, and Color Doppler    Patient: Alicia Suazo  MR #: [id number]  : 1975  Age: 53 years  Gender: Male  Study date: 12-Dec-2019  Account #: [de-identified]  Height: 66 in  Weight: 209.7 lb  BSA: 2.04 mï¾²  Status:Routine  Location: ER03  BP: 133/ 76    Allergies: NO KNOWN ALLERGENS    Sonographer:  Tenisha Torres, RCS  Group:  [facility]  Referring Physician:  Myah Ellington MD  Reading Physician:  Chichi Palomares MD [facility] [place]    INDICATIONS: CVA    PROCEDURE: This was a routine study. A transthoracic echocardiogram was  performed. The study included complete 2D imaging, M-mode, complete spectral  Doppler, and color Doppler. Intravenous contrast (Definity) was administered. Image quality was adequate. LEFT VENTRICLE: Size was normal. Systolic function was normal. Ejection  fraction was estimated in the range of 65 % to 70 %. There were no regional  wall motion abnormalities. There was mild concentric hypertrophy. Left  ventricular diastolic function parameters were normal. Avg E/e': 11.05.    RIGHT VENTRICLE: The size was normal. Systolic function was normal. The  tricuspid jet envelope definition was inadequate for estimation of RV   systolic  pressure. LEFT ATRIUM: Size was normal.    ATRIAL SEPTUM: Agitated saline contrast injection (bubble study) was   performed. There was a right-to-left shunt, in the baseline state.     RIGHT ATRIUM: Size was normal.    SYSTEMIC VEINS: IVC: The inferior vena cava was normal in size and course. The  respirophasic change in diameter was more than 50%. AORTIC VALVE: The valve was trileaflet. Leaflets exhibited mild sclerosis. There was no evidence for stenosis. There was no insufficiency. MITRAL VALVE: Valve structure was normal. There was no evidence for stenosis. There was no regurgitation. TRICUSPID VALVE: The valve structure was normal. There was no evidence for  stenosis. There was trivial regurgitation. PULMONIC VALVE: Not well visualized. There was no evidence for stenosis. There  was no insufficiency. PERICARDIUM: There was no pericardial effusion. AORTA: The root exhibited normal size. SUMMARY:    -  Left ventricle: Systolic function was normal. Ejection fraction was  estimated in the range of 65 % to 70 %. There were no regional wall motion  abnormalities. There was mild concentric hypertrophy. -  Atrial septum: Agitated saline contrast injection (bubble study) was  performed. There was a right-to-left shunt, in the baseline state. -  Inferior vena cava, hepatic veins: The respirophasic change in diameter   was  more than 50%. SYSTEM MEASUREMENT TABLES    2D mode  AoR Diam (2D): 2.8 cm  LA Dimension (2D): 3.5 cm  Left Atrium Systolic Volume Index; Method of Disks, Biplane; 2D mode;: 26   ml/m2  IVS/LVPW (2D): 1  IVSd (2D): 1.2 cm  LVIDd (2D): 5.2 cm  LVIDs (2D): 2.9 cm  LVOT Area (2D): 2.3 cm2  LVPWd (2D): 1.2 cm  RVIDd (2D): 2.2 cm    Tissue Doppler Imaging  LV Peak Early Thomas Tissue Rc; Lateral MA (TDI): 8.1 cm/s  LV Peak Early Thomas Tissue Rc; Medial MA (TDI): 7.6 cm/s    Unspecified Scan Mode  Peak Grad; Mean; Antegrade Flow: 14 mm[Hg]  Vmax; Antegrade Flow: 185 cm/s  LVOT Diam: 1.7 cm  MV Peak Rc/LV Peak Tissue Rc E-Wave; Lateral MA: 10.7  MV Peak Rc/LV Peak Tissue Rc E-Wave; Medial MA: 11.4    Prepared and signed by    George Hackett.  Mary Dorman MD [facility] [place]  Signed 12-Dec-2019 15:46:58             Assessment and Plan:   Principal Problem:    Acute embolic stroke - continues to have L sided neglect. Appreciate physiatry consultation. Cont statin, ASA, PT/OT/ST. Needs outpatient cardiology follow-up for event monitor and PFO closure. Active Problems:    Hypertension - relatively well controlled on Norvasc and metoprolol. Type 2 diabetes mellitus - cont on Lantus and SSI      Arrhythmia - had brief wide complex tachycardia, will need event monitor as outpt per cards      Hyperlipemia - statin therapy      PFO (patent foramen ovale) - will need closure as outpatient per cardiology      Ppx: Lovenox for VTE    Code Status: FULL CODE    Disposition: patient has been medically clear for discharge but is without insurance, needs rehab/placement. Awaiting Medicaid approval for inpatient rehab. Per previous notes, does have plans for first floor apartment once able to discharge to home.      Signed:  ELAINE Drew

## 2019-12-30 ENCOUNTER — APPOINTMENT (OUTPATIENT)
Dept: GENERAL RADIOLOGY | Age: 44
DRG: 045 | End: 2019-12-30
Attending: PHYSICIAN ASSISTANT
Payer: MEDICAID

## 2019-12-30 LAB
GLUCOSE BLD STRIP.AUTO-MCNC: 115 MG/DL (ref 65–100)
GLUCOSE BLD STRIP.AUTO-MCNC: 121 MG/DL (ref 65–100)
GLUCOSE BLD STRIP.AUTO-MCNC: 148 MG/DL (ref 65–100)
GLUCOSE BLD STRIP.AUTO-MCNC: 167 MG/DL (ref 65–100)

## 2019-12-30 PROCEDURE — 74011636637 HC RX REV CODE- 636/637: Performed by: INTERNAL MEDICINE

## 2019-12-30 PROCEDURE — 74011250637 HC RX REV CODE- 250/637: Performed by: INTERNAL MEDICINE

## 2019-12-30 PROCEDURE — 65270000029 HC RM PRIVATE

## 2019-12-30 PROCEDURE — 82962 GLUCOSE BLOOD TEST: CPT

## 2019-12-30 PROCEDURE — 65660000000 HC RM CCU STEPDOWN

## 2019-12-30 PROCEDURE — 73522 X-RAY EXAM HIPS BI 3-4 VIEWS: CPT

## 2019-12-30 PROCEDURE — 74011250636 HC RX REV CODE- 250/636: Performed by: INTERNAL MEDICINE

## 2019-12-30 PROCEDURE — 97530 THERAPEUTIC ACTIVITIES: CPT

## 2019-12-30 PROCEDURE — 74011250637 HC RX REV CODE- 250/637: Performed by: HOSPITALIST

## 2019-12-30 PROCEDURE — 74011250637 HC RX REV CODE- 250/637: Performed by: PHYSICIAN ASSISTANT

## 2019-12-30 RX ADMIN — Medication 5 ML: at 21:31

## 2019-12-30 RX ADMIN — AMLODIPINE BESYLATE 10 MG: 10 TABLET ORAL at 07:59

## 2019-12-30 RX ADMIN — Medication 5 ML: at 05:57

## 2019-12-30 RX ADMIN — Medication 5 ML: at 15:01

## 2019-12-30 RX ADMIN — LISINOPRIL 40 MG: 20 TABLET ORAL at 07:59

## 2019-12-30 RX ADMIN — INSULIN LISPRO 2 UNITS: 100 INJECTION, SOLUTION INTRAVENOUS; SUBCUTANEOUS at 07:58

## 2019-12-30 RX ADMIN — GUAIFENESIN 100 MG: 100 SOLUTION ORAL at 21:29

## 2019-12-30 RX ADMIN — ENOXAPARIN SODIUM 40 MG: 40 INJECTION SUBCUTANEOUS at 15:01

## 2019-12-30 RX ADMIN — ASPIRIN 81 MG 81 MG: 81 TABLET ORAL at 07:58

## 2019-12-30 RX ADMIN — POLYETHYLENE GLYCOL 3350 17 G: 17 POWDER, FOR SOLUTION ORAL at 08:02

## 2019-12-30 RX ADMIN — INSULIN GLARGINE 10 UNITS: 100 INJECTION, SOLUTION SUBCUTANEOUS at 21:29

## 2019-12-30 RX ADMIN — ACETAMINOPHEN 650 MG: 325 TABLET, FILM COATED ORAL at 18:51

## 2019-12-30 RX ADMIN — METOPROLOL TARTRATE 25 MG: 25 TABLET ORAL at 16:52

## 2019-12-30 RX ADMIN — ACETAMINOPHEN 650 MG: 325 TABLET, FILM COATED ORAL at 09:36

## 2019-12-30 RX ADMIN — PANTOPRAZOLE SODIUM 40 MG: 40 TABLET, DELAYED RELEASE ORAL at 05:57

## 2019-12-30 RX ADMIN — ATORVASTATIN CALCIUM 80 MG: 80 TABLET, FILM COATED ORAL at 21:29

## 2019-12-30 RX ADMIN — METOPROLOL TARTRATE 25 MG: 25 TABLET ORAL at 07:59

## 2019-12-30 NOTE — PROGRESS NOTES
Problem: Mobility Impaired (Adult and Pediatric)  Goal: *Acute Goals and Plan of Care  Description  STG:  (1.) Mr. Jonathon Smith will move from supine to sit and sit to supine , scoot up and down and roll side to side with MINIMAL ASSIST within 3 treatment day(s). (2.) Mr. Jonathon Smith will transfer from bed to chair and chair to bed with MODERATE ASSIST using the least restrictive device within 3 treatment day(s). (3.) Mr. Jonathon Smith will perform standing static and dynamic balance activities x 10 minutes with MODERATE ASSISTx1 to improve safety within 3 treatment day(s). LTG:  (1.) Mr. Jonathon Smith will move from supine to sit and sit to supine , scoot up and down and roll side to side with CONTACT GUARD ASSIST within 7 treatment day(s). (2.) Mr. Jonathon Smith will transfer from bed to chair and chair to bed with MINIMAL ASSIST using the least restrictive device within 7 treatment day(s). (3.) Mr. Jonathon Smith will ambulate with MODERATE ASSIST for 50+ feet with the least restrictive device within 7 treatment day(s). (4.) Mr. Jonathon Smith will perform standing static and dynamic balance activities x 20 minutes with MINIMAL ASSIST to improve safety within 7 treatment day(s). (5.) Mr. Jonathon Smith will perform bilateral lower extremity exercises x 15 min for HEP with SUPERVISION to improve strength, endurance, and functional mobility within 7 treatment day(s).      PHYSICAL THERAPY: Daily Note and AM 12/30/2019  INPATIENT: PT Visit Days : 6  Payor: MEDICAID PENDING / Plan: Corey Olguin PENDING / Product Type: Medicaid /       NAME/AGE/GENDER: Shahana Mcdaniel is a 53 y.o. male   PRIMARY DIAGNOSIS: Acute ischemic stroke ([address] [I63.9]  Cerebrovascular accident (CVA) due to embolism ([address] [Q14.2] Acute embolic stroke ([address] Acute embolic stroke ([address]       ICD-10: Treatment Diagnosis:   · Other lack of cordination (R27.8)  · Difficulty in walking, Not elsewhere classified (R26.2)  · Other abnormalities of gait and mobility (R26.89)  · Unspecified Lack of Coordination (R27.9)  · Hemiplegia and hemiparesis following cerebral infarction affecting   · left non-dominant side (Q37.276)   Precaution/Allergies:  Patient has no known allergies. ASSESSMENT:     Mr. Teresa Christine is a 53year old M who presents to hospital with L sided facial droop and LUE and LLE weakness. MRI indicated acute infarcts in L cerebellar hemisphere, deep frontoparietal white matter and R paramedian yariel. Prior to hospital admission pt lives with his wife in a one story apartment on the second level with 12 steps to access. Pt endorses no falls in past 6 months. Prior to admission Mr. Teresa Christine uses no DME for mobility. He reports he had been working full time but got laid off, so now he works just a few days at [employer], which requires him being on his feet. Upon entering pt resting in bed. His speech seems to be getting somewhat better. Today we worked on functional mobility having him help kick his L leg off the EOB with his R leg with sidelying to sit. Encouraged him to sit up from sidelying using his R arm as much as possible but still requiring moderate assist for this. His sitting balance was good today. Worked on scooting his R hip forward by using R hand behind on the bed and scooting hips. He needed assist with L hip. He did a stand pivot to the chair with moderate assist.  Practiced sit to stand from the chair 3 times with moderate assist x2. Had him looking into the mirror but he still requires constant verbal cues to stand erect and tall. Was to return later in the day to again work on stand pivot from chair but he had fallen out of the chair leaning forward trying to get something on the floor. Good progress today. Will need re eval next visit as it will be his 7th visit. * when patient in recliner, recline legs.   He fell out of the chair trying to pick something up on the floor*    This section established at most recent assessment PROBLEM LIST (Impairments causing functional limitations):  1. Decreased Strength  2. Decreased ADL/Functional Activities  3. Decreased Transfer Abilities  4. Decreased Ambulation Ability/Technique  5. Decreased Balance  6. Increased Pain  7. Decreased Activity Tolerance  8. Increased Fatigue  9. Decreased Flexibility/Joint Mobility   INTERVENTIONS PLANNED: (Benefits and precautions of physical therapy have been discussed with the patient.)  1. Balance Exercise  2. Bed Mobility  3. Family Education  4. Gait Training  5. Home Exercise Program (HEP)  6. Manual Therapy  7. Neuromuscular Re-education/Strengthening  8. Range of Motion (ROM)  9. Therapeutic Activites  10. Therapeutic Exercise/Strengthening  11. Transfer Training     TREATMENT PLAN: Frequency/Duration: 3 times a week for duration of hospital stay  Rehabilitation Potential For Stated Goals: [facility] (at time of discharge pending progress):    Placement: It is my opinion, based on this patient's performance to date, that Mr. Sebastián Ramos may benefit from intensive therapy at an [address] [place] [address] after discharge due to a probable need for 24 hour rehab nursing, a probable need for multiple therapy disciplines, and potential to make ongoing and sustainable functional improvement that is of practical value. .  Equipment:    TBD pending progress with therapy. HISTORY:   History of Present Injury/Illness (Reason for Referral):  Per H&P: \"Pt is a 52 y/o smoker with DM, HTN, who presented to ER with L leg and arm weakness, L facial droop, dysarthria. First noted L leg weakness late night 12/11 when he woke up to go to the bathroom. He was normal when he went to bed around 1030. Woke up this morning and had persistent weakness L leg and also now noted in L arm. EMS called. Noted to have slurred speech and L facial droop as well.   Code S called in ER around 9am.  MRI with acute infarcts in L cerebellar hemisphere, deep frontoparietal white matter, and R paramedian yariel. Also noted old lacunar infarcts. No large vessel occlusion on CTA, but some stenosis noted. No hx afib, TIA, CVA. No CP, palpitations, SOB. \"  Past Medical History/Comorbidities:   Mr. Juvencio Saavedra  has a past medical history of Acute ischemic stroke ([address] (12/12/2019), Acute pancreatitis (11/19/2014), Cerebrovascular accident (CVA) due to embolism ([address] (12/12/2019), Diabetes ([address] (2002), Diabetes ([address] Diabetes mellitus, and Hypertension. He also has no past medical history of Arthritis, Asthma, Autoimmune disease ([address] CAD (coronary artery disease), Cancer ([address] Chronic kidney disease, COPD, Dementia, Dementia ([address] Heart failure ([address] Ill-defined condition, Infectious disease, Liver disease, Other ill-defined conditions(799.89), Psychiatric disorder, PUD (peptic ulcer disease), Seizures ([address] or Sleep disorder. Mr. Juvencio Saavedra  has a past surgical history that includes hx hernia repair and hx orthopaedic. Social History/Living Environment:   Home Environment: Apartment  # Steps to Enter: 12  Rails to Enter: Yes  [employer] : Bilateral  One/Two Story Residence: One story  Living Alone: No  Support Systems: Spouse/Significant Other/Partner  Patient Expects to be Discharged to[de-identified] Unknown  Current DME Used/Available at Home: None  Tub or Shower Type: Tub/Shower combination  Prior Level of Function/Work/Activity:  Independent, lives with wife in 2nd story 1 level apartment. No recent falls. Number of Personal Factors/Comorbidities that affect the Plan of Care: 1-2: MODERATE COMPLEXITY   EXAMINATION:   Most Recent Physical Functioning:   Gross Assessment:                  Posture:     Balance:    Bed Mobility:  Rolling: Moderate assistance  Supine to Sit: Moderate assistance  Scooting: Maximum assistance(for L hip - got his R hip with cues)  Wheelchair Mobility:     Transfers:  Sit to Stand:  Moderate assistance;Assist x2  Bed to Chair: Moderate assistance;Assist x2  Gait:            Body Structures Involved:  1. Nerves  2. Voice/Speech  3. Bones  4. Joints  5. Muscles Body Functions Affected:  1. Mental  2. Sensory/Pain  3. Neuromusculoskeletal  4. Movement Related Activities and Participation Affected:  1. General Tasks and Demands  2. Mobility  3. Self Care  4. Interpersonal Interactions and Relationships   Number of elements that affect the Plan of Care: 4+: HIGH COMPLEXITY   CLINICAL PRESENTATION:   Presentation: Evolving clinical presentation with changing clinical characteristics: MODERATE COMPLEXITY   CLINICAL DECISION MAKIN [facility] Inpatient Short Form  How much difficulty does the patient currently have. .. Unable A Lot A Little None   1. Turning over in bed (including adjusting bedclothes, sheets and blankets)? [] 1   [] 2   [x] 3   [] 4   2. Sitting down on and standing up from a chair with arms ( e.g., wheelchair, bedside commode, etc.)   [] 1   [x] 2   [] 3   [] 4   3. Moving from lying on back to sitting on the side of the bed? [] 1   [x] 2   [] 3   [] 4   How much help from another person does the patient currently need. .. Total A Lot A Little None   4. Moving to and from a bed to a chair (including a wheelchair)? [] 1   [x] 2   [] 3   [] 4   5. Need to walk in hospital room? [] 1   [x] 2   [] 3   [] 4   6. Climbing 3-5 steps with a railing? [] 1   [x] 2   [] 3   [] 4   © , Trustees of [address] [place] [id number], under license to [employer]. All rights reserved      Score:  Initial: 13 Most Recent: X (Date: -- )    Interpretation of Tool:  Represents activities that are increasingly more difficult (i.e. Bed mobility, Transfers, Gait). Medical Necessity:     · Patient is expected to demonstrate progress in   · strength, range of motion, balance, coordination, and functional technique  ·  to   · increase independence with all mobility.    · .  Reason for Services/Other Comments:  · Patient continues to require skilled intervention due to   · medical complications and mobility deficits which impact his level of function, safety, and independence as indicated above. · .   Use of outcome tool(s) and clinical judgement create a POC that gives a: Questionable prediction of patient's progress: MODERATE COMPLEXITY        TREATMENT:   (In addition to Assessment/Re-Assessment sessions the following treatments were rendered)   Pre-treatment Symptoms/Complaints:  \"ok\"  Pain: Initial:   Pain Intensity 1: 0  Post Session:  No pain reported at end of session, resting comfortably in bed. Therapeutic Activity: (    15 Minutes): Therapeutic activities including bed mobility, rolling, scooting, sit <> stand transfer training  And stand pivot to the bed to improve mobility, strength, balance, and coordination. Required maximal cues with moderate physical assist   to promote coordination of bilateral, upper extremity(s), lower extremity(s) and promote motor control of bilateral, upper extremity(s), lower extremity(s). Date:  12/24/19 Date:   Date:     Activity/Exercise Parameters Parameters Parameters   Supine heel slides 10x B   PROM L     Supine SLR 10x R     Supine bridging 10x holding L knee bent     Supine LTR 10x ea direction                              Braces/Orthotics/Lines/Etc:   · O2 Device: Room Air   Treatment/Session Assessment:    · Response to Treatment:  see above. Works very hard with therapy. · Interdisciplinary Collaboration:   o Physical Therapy Assistant  o Registered Nurse  o Rehabilitation Attendant  · After treatment position/precautions:   o Up in chair  o Bed alarm/tab alert on  o Bed/Chair-wheels locked  o Bed in low position  o Call light within reach  o RN notified   · Compliance with Program/Exercises: Compliant all of the time  · Recommendations/Intent for next treatment session:   \"Next visit will focus on advancements to more challenging activities and reduction in assistance provided\".     Total Treatment Duration:  PT Patient Time In/Time Out  Time In: 1115  Time Out: 1130    Claudene Laws, PTA

## 2019-12-30 NOTE — INTERDISCIPLINARY ROUNDS
Interdisciplinary team rounds were held 12/30/2019 with the following team members:  Care Management, Physical Therapy, Physician Assistant and . Plan of care discussed. See clinical pathway and/or care plan for interventions and desired outcomes.

## 2019-12-30 NOTE — PROGRESS NOTES
Hospitalist Note     Admit Date:  2019  9:07 AM   Name:  Gifty Johnson   Age:  53 y.o.  :  1975   MRN:  [id number]   PCP:  Joss Chiang MD    HPI: Mr. Papito Ellison is a 54 y/o smoker with DM, HTN, who presented to ER  with L leg and arm weakness, L facial droop, dysarthria. First noted L leg weakness late night  when he woke up to go to the bathroom. He was normal when he went to bed around 1030. EMS called. Noted to have slurred speech and L facial droop as well. Code S called in ER around 9am.  MRI with acute infarcts in L cerebellar hemisphere, deep frontoparietal white matter, and R paramedian yariel. Also noted old lacunar infarcts. No large vessel occlusion on CTA, but some stenosis noted. He was started on asa, statins. Neurology consulted. An ECHO showed PFO. Plan for cardiology referral and evaluate for closure as outpatient. PT/OT suggested inpatient rehabilitation. The patient is uninsured and the only option at this moment is [facility]. Subjective:   Pt seen and examined this morning. Endorses no complaints. Laying in bed watching TV. He appears depressed. Yesterday he was bradycardic, metoprolol dose reduced. Objective:     Patient Vitals for the past 24 hrs:   Temp Pulse Resp BP SpO2   19 0800 98.5 °F (36.9 °C) 93 18 137/88 97 %   19 0400 98 °F (36.7 °C) 70 18 117/84 97 %   19 0000 98.5 °F (36.9 °C) 62 18 121/78 96 %   19 2000 97.6 °F (36.4 °C) (!) 58 18 111/73 94 %   19 1600 98.2 °F (36.8 °C) 62 18 121/77 93 %     Oxygen Therapy  O2 Sat (%): 97 % (19 0800)  Pulse via Oximetry: 73 beats per minute (19 1615)  No intake or output data in the 24 hours ending 19 1200      Physical Exam:  General:    Well nourished. Alert and oriented. CV:   RRR. No murmur, rub, or gallop. Lungs:  Clear to auscultation bilaterally. No wheezing, rhonchi, or rales  Extremities: Warm and dry. No cyanosis or edema.   Neurologic: CN II-XII intact except for mild L facial droop. Sensation intact. PERRLA.  dysarthria. No confusion. STR 1/5 LUE and LLE. Dysarthria noted. Skin:     No rashes or jaundice. No wounds. Psych:  Appears depressed    I reviewed the labs, imaging, EKGs, telemetry, and other studies done this admission.     Data Review:   Recent Results (from the past 24 hour(s))   GLUCOSE, POC    Collection Time: 12/29/19  4:40 PM   Result Value Ref Range    Glucose (POC) 117 (H) 65 - 100 mg/dL   GLUCOSE, POC    Collection Time: 12/29/19  9:34 PM   Result Value Ref Range    Glucose (POC) 125 (H) 65 - 100 mg/dL   GLUCOSE, POC    Collection Time: 12/30/19  7:44 AM   Result Value Ref Range    Glucose (POC) 167 (H) 65 - 100 mg/dL       All Micro Results     None          Current Facility-Administered Medications   Medication Dose Route Frequency    polyethylene glycol (MIRALAX) packet 17 g  17 g Oral DAILY PRN    metoprolol tartrate (LOPRESSOR) tablet 25 mg  25 mg Oral BID    insulin glargine (LANTUS) injection 10 Units  10 Units SubCUTAneous QHS    pantoprazole (PROTONIX) tablet 40 mg  40 mg Oral ACB    guaiFENesin (ROBITUSSIN) 100 mg/5 mL oral liquid 100 mg  100 mg Oral Q4H PRN    hydrALAZINE (APRESOLINE) 20 mg/mL injection 20 mg  20 mg IntraVENous Q4H PRN    atorvastatin (LIPITOR) tablet 80 mg  80 mg Oral QHS    amLODIPine (NORVASC) tablet 10 mg  10 mg Oral DAILY    lisinopril (PRINIVIL, ZESTRIL) tablet 40 mg  40 mg Oral DAILY    sodium chloride (NS) flush 5-40 mL  5-40 mL IntraVENous Q8H    sodium chloride (NS) flush 5-40 mL  5-40 mL IntraVENous PRN    ondansetron (ZOFRAN) injection 4 mg  4 mg IntraVENous Q4H PRN    aspirin chewable tablet 81 mg  81 mg Oral DAILY    acetaminophen (TYLENOL) tablet 650 mg  650 mg Oral Q4H PRN    enoxaparin (LOVENOX) injection 40 mg  40 mg SubCUTAneous Q24H    insulin lispro (HUMALOG) injection   SubCUTAneous AC&HS    dextrose 40% (GLUTOSE) oral gel 1 Tube  15 g Oral PRN    glucagon (GLUCAGEN) injection 1 mg  1 mg IntraMUSCular PRN    dextrose (D50W) injection syrg 12.5-25 g  25-50 mL IntraVENous PRN       Other Studies:  No results found. Results for orders placed or performed during the hospital encounter of 19   2D ECHO COMPLETE ADULT (TTE) [address]  [address] [facility], [address] [facility]  [phone]    Transthoracic Echocardiogram  2D, M-mode, Doppler, and Color Doppler    Patient: Ag Ramos  MR #: [id number]  : 1975  Age: 53 years  Gender: Male  Study date: 12-Dec-2019  Account #: [de-identified]  Height: 66 in  Weight: 209.7 lb  BSA: 2.04 mï¾²  Status:Routine  Location: ER03  BP: 133/ 76    Allergies: NO KNOWN ALLERGENS    Sonographer:  LUZ Martínez  Group:  [facility]  Referring Physician:  Sherri Gonzalez. Edith Marley MD  Reading Physician:  Greg Moeller MD [facility] [place]    INDICATIONS: CVA    PROCEDURE: This was a routine study. A transthoracic echocardiogram was  performed. The study included complete 2D imaging, M-mode, complete spectral  Doppler, and color Doppler. Intravenous contrast (Definity) was administered. Image quality was adequate. LEFT VENTRICLE: Size was normal. Systolic function was normal. Ejection  fraction was estimated in the range of 65 % to 70 %. There were no regional  wall motion abnormalities. There was mild concentric hypertrophy. Left  ventricular diastolic function parameters were normal. Avg E/e': 11.05.    RIGHT VENTRICLE: The size was normal. Systolic function was normal. The  tricuspid jet envelope definition was inadequate for estimation of RV   systolic  pressure. LEFT ATRIUM: Size was normal.    ATRIAL SEPTUM: Agitated saline contrast injection (bubble study) was   performed. There was a right-to-left shunt, in the baseline state. RIGHT ATRIUM: Size was normal.    SYSTEMIC VEINS: IVC: The inferior vena cava was normal in size and course. The  respirophasic change in diameter was more than 50%. AORTIC VALVE: The valve was trileaflet. Leaflets exhibited mild sclerosis. There was no evidence for stenosis. There was no insufficiency. MITRAL VALVE: Valve structure was normal. There was no evidence for stenosis. There was no regurgitation. TRICUSPID VALVE: The valve structure was normal. There was no evidence for  stenosis. There was trivial regurgitation. PULMONIC VALVE: Not well visualized. There was no evidence for stenosis. There  was no insufficiency. PERICARDIUM: There was no pericardial effusion. AORTA: The root exhibited normal size. SUMMARY:    -  Left ventricle: Systolic function was normal. Ejection fraction was  estimated in the range of 65 % to 70 %. There were no regional wall motion  abnormalities. There was mild concentric hypertrophy. -  Atrial septum: Agitated saline contrast injection (bubble study) was  performed. There was a right-to-left shunt, in the baseline state. -  Inferior vena cava, hepatic veins: The respirophasic change in diameter   was  more than 50%. SYSTEM MEASUREMENT TABLES    2D mode  AoR Diam (2D): 2.8 cm  LA Dimension (2D): 3.5 cm  Left Atrium Systolic Volume Index; Method of Disks, Biplane; 2D mode;: 26   ml/m2  IVS/LVPW (2D): 1  IVSd (2D): 1.2 cm  LVIDd (2D): 5.2 cm  LVIDs (2D): 2.9 cm  LVOT Area (2D): 2.3 cm2  LVPWd (2D): 1.2 cm  RVIDd (2D): 2.2 cm    Tissue Doppler Imaging  LV Peak Early Thomas Tissue Rc; Lateral MA (TDI): 8.1 cm/s  LV Peak Early Thomas Tissue Rc; Medial MA (TDI): 7.6 cm/s    Unspecified Scan Mode  Peak Grad; Mean; Antegrade Flow: 14 mm[Hg]  Vmax; Antegrade Flow: 185 cm/s  LVOT Diam: 1.7 cm  MV Peak Rc/LV Peak Tissue Rc E-Wave; Lateral MA: 10.7  MV Peak Rc/LV Peak Tissue Rc E-Wave; Medial MA: 11.4    Prepared and signed by    Roseanne Yang.  Carlos Lazo MD [facility] [place]  Signed 12-Dec-2019 15:46:58             Assessment and Plan:   Principal Problem:    Acute embolic stroke - continues to have L sided neglect. Appreciate physiatry consultation. Cont statin, ASA, PT/OT/ST. Needs outpatient cardiology follow-up for event monitor and PFO closure. Active Problems:    Hypertension - relatively well controlled on Norvasc and metoprolol. Metoprolol dose was reduced due to persistent bradycardia and nursing having to hold medication. Type 2 diabetes mellitus - cont on Lantus and SSI      Arrhythmia - had brief wide complex tachycardia, will need event monitor as outpt per cards      Hyperlipemia - statin therapy      PFO (patent foramen ovale) - will need closure as outpatient per cardiology      Ppx: Lovenox for VTE    Code Status: FULL CODE    Disposition: patient has been medically clear for discharge but is without insurance, needs rehab/placement. Awaiting Medicaid approval for inpatient rehab. Per previous notes, does have plans for first floor apartment once able to discharge to home.      Signed:  ELAINE Au

## 2019-12-30 NOTE — PROGRESS NOTES
12/30/19 0709   NIH Stroke Scale   Interval Other (comment)  (Dual NIH with Maura Longo RN )   LOC 0   LOC Questions 0   LOC Commands 0   Best Gaze 0   Visual 0   Facial Palsy 2   Motor Right Arm 0   Motor Left Arm 4   Motor Right Leg 0   Motor Left Leg 4   Limb Ataxia 0   Sensory 0   Best Language 0   Dysarthria 1   Extinction and Inattention 0   Total 11

## 2019-12-30 NOTE — PROGRESS NOTES
12/29/19 1928   NIH Stroke Scale   Interval Other (comment)  (Shift change with [place], RN)   LOC 0   LOC Questions 0   LOC Commands 0   Best Gaze 0   Visual 0   Facial Palsy 2   Motor Right Arm 0   Motor Left Arm 4   Motor Right Leg 0   Motor Left Leg 4   Limb Ataxia 0   Sensory 0   Best Language 0   Dysarthria 1   Extinction and Inattention 0   Total 11

## 2019-12-30 NOTE — ROUTINE PROCESS
This RN heard patient's posey chair alarm. Patient found on floor and is unsure of how or why he fell. Denies injury or hitting his head. Patient transferred back to bed. VS stable and Dr. Cunningham File notified. No orders at this time. Patient resting comfortable in bed. Supervisor notified by charge RN, spouse called with no answer, voicemail left to return call.

## 2019-12-30 NOTE — PROGRESS NOTES
Nutrition follow-up:     Assessment:   Diet: DIET NUTRITIONAL SUPPLEMENTS All Meals; Glucerna Shake  DIET DIABETIC CONSISTENT CARB Mechanical Soft; AHA-LOW-CHOL FAT    Food/Nutrition Patient History:    Pt is 52 yo male who presented with weakness, facial drooping and slurred speech. He has history of CVA, DM type 2, HTN, pancreatitis, GERD and a smoker. Diet progressed per SLP 12/17. Pt sleeping at RD visit, no family present. RN reports he ate grits and full container of applesauce with meds this am.  His intake is inconsistent. SLP continues to work with patient. Last recorded BM 12/25. Results for Kisha Dyer (MRN [id number]) as of 12/30/2019 10:37   12/28/2019 10:39 12/28/2019 16:37 12/28/2019 21:58 12/29/2019 08:01 12/29/2019 11:07 12/29/2019 16:40 12/29/2019 21:34 12/30/2019 07:44   GLUCOSE,FAST -  (H) 166 (H) 99 210 (H) 126 (H) 117 (H) 125 (H) 167 (H)     Anthropometrics:  Height: 5' 6\" (167.6 cm), Weight: 95.3 kg (210 lb), source not specified, Body mass index is 33.89 kg/m². BMI class of obese. Last 3 Recorded Weights in this Encounter    12/12/19 0906   Weight: 95.3 kg (210 lb)   Macronutrient needs: MSJ (using CBW (Current body weight) unknown 95.3 kg)  EER: 2130 kcal/day (22 kcals/kg )  EPR: 106 g/day (1.1 g/kg ) (20%)     Intake/Comparative Standards: No recorded po since last RD visit. Insufficient data to fully assess at this time.     Nutrition Intervention:  Meals and Snacks: Continue with current diet. Progressions per SLP. Commercial Beverages and Supplements: Continue Glucerna TID in vanilla. Blood sugar remain appropriate for continues use with report of fluctuating intake. Coordination of Care: Discussed with Rhonda Bravo RN. Discharge Nutrition Plan: At this juncture would anticipate he will continue to require oral supplements secondary to reports of inconsistent po intake.      Ralph Egan, [address]

## 2019-12-30 NOTE — PROGRESS NOTES
Problem: Pressure Injury - Risk of  Goal: *Prevention of pressure injury  Description  Document Dewey Scale and appropriate interventions in the flowsheet. Outcome: Progressing Towards Goal  Note: Pressure Injury Interventions:  Sensory Interventions: Assess changes in LOC, Assess need for specialty bed, Check visual cues for pain, Discuss PT/OT consult with provider, Float heels, Pad between skin to skin, Pressure redistribution bed/mattress (bed type), Suspension boots, Turn and reposition approx. every two hours (pillows and wedges if needed)    Moisture Interventions: Absorbent underpads, Apply protective barrier, creams and emollients, Check for incontinence Q2 hours and as needed, Limit adult briefs    Activity Interventions: Increase time out of bed, PT/OT evaluation, Pressure redistribution bed/mattress(bed type)    Mobility Interventions: Assess need for specialty bed, HOB 30 degrees or less, Pressure redistribution bed/mattress (bed type), PT/OT evaluation, Suspension boots, Turn and reposition approx. every two hours(pillow and wedges)    Nutrition Interventions: Document food/fluid/supplement intake, Offer support with meals,snacks and hydration    Friction and Shear Interventions: Apply protective barrier, creams and emollients, Foam dressings/transparent film/skin sealants                Problem: Patient Education: Go to Patient Education Activity  Goal: Patient/Family Education  Outcome: Progressing Towards Goal     Problem: Falls - Risk of  Goal: *Absence of Falls  Description  Document Darren Ovidio Fall Risk and appropriate interventions in the flowsheet.   Outcome: Progressing Towards Goal  Note: Fall Risk Interventions:  Mobility Interventions: Bed/chair exit alarm, OT consult for ADLs, Patient to call before getting OOB, PT Consult for mobility concerns    Mentation Interventions: Adequate sleep, hydration, pain control, Bed/chair exit alarm, Door open when patient unattended, Reorient patient, More frequent rounding    Medication Interventions: Bed/chair exit alarm, Evaluate medications/consider consulting pharmacy, Patient to call before getting OOB, Teach patient to arise slowly    Elimination Interventions: Bed/chair exit alarm, Call light in reach, Patient to call for help with toileting needs, Stay With Me (per policy), Urinal in reach    History of Falls Interventions: Bed/chair exit alarm, Door open when patient unattended, Investigate reason for fall         Problem: Patient Education: Go to Patient Education Activity  Goal: Patient/Family Education  Outcome: Progressing Towards Goal     Problem: Diabetes Self-Management  Goal: *Disease process and treatment process  Description  Define diabetes and identify own type of diabetes; list 3 options for treating diabetes. Outcome: Progressing Towards Goal  Goal: *Incorporating nutritional management into lifestyle  Description  Describe effect of type, amount and timing of food on blood glucose; list 3 methods for planning meals. Outcome: Progressing Towards Goal  Goal: *Incorporating physical activity into lifestyle  Description  State effect of exercise on blood glucose levels. Outcome: Progressing Towards Goal  Goal: *Developing strategies to promote health/change behavior  Description  Define the ABC's of diabetes; identify appropriate screenings, schedule and personal plan for screenings. Outcome: Progressing Towards Goal  Goal: *Using medications safely  Description  State effect of diabetes medications on diabetes; name diabetes medication taking, action and side effects. Outcome: Progressing Towards Goal  Goal: *Monitoring blood glucose, interpreting and using results  Description  Identify recommended blood glucose targets  and personal targets.   Outcome: Progressing Towards Goal  Goal: *Prevention, detection, treatment of acute complications  Description  List symptoms of hyper- and hypoglycemia; describe how to treat low blood sugar and actions for lowering  high blood glucose level. Outcome: Progressing Towards Goal  Goal: *Prevention, detection and treatment of chronic complications  Description  Define the natural course of diabetes and describe the relationship of blood glucose levels to long term complications of diabetes.   Outcome: Progressing Towards Goal  Goal: *Developing strategies to address psychosocial issues  Description  Describe feelings about living with diabetes; identify support needed and support network  Outcome: Progressing Towards Goal     Problem: Patient Education: Go to Patient Education Activity  Goal: Patient/Family Education  Outcome: Progressing Towards Goal     Problem: General Medical Care Plan  Goal: *Vital signs within specified parameters  Outcome: Progressing Towards Goal  Goal: *Labs within defined limits  Outcome: Progressing Towards Goal  Goal: *Absence of infection signs and symptoms  Description  Wash hand more often   Outcome: Progressing Towards Goal  Goal: *Optimal pain control at patient's stated goal  Outcome: Progressing Towards Goal  Goal: *Skin integrity maintained  Outcome: Progressing Towards Goal  Goal: *Fluid volume balance  Outcome: Progressing Towards Goal  Goal: *Optimize nutritional status  Outcome: Progressing Towards Goal  Goal: *Anxiety reduced or absent  Outcome: Progressing Towards Goal  Goal: *Progressive mobility and function (eg: ADL's)  Outcome: Progressing Towards Goal     Problem: Patient Education: Go to Patient Education Activity  Goal: Patient/Family Education  Outcome: Progressing Towards Goal     Problem: TIA/CVA Stroke: Day 2 Until Discharge  Goal: Activity/Safety  Outcome: Progressing Towards Goal  Goal: Diagnostic Test/Procedures  Outcome: Progressing Towards Goal  Goal: Nutrition/Diet  Outcome: Progressing Towards Goal  Goal: Discharge Planning  Outcome: Progressing Towards Goal  Goal: Medications  Outcome: Progressing Towards Goal  Goal: Respiratory  Outcome: Progressing Towards Goal  Goal: Treatments/Interventions/Procedures  Outcome: Progressing Towards Goal  Goal: Psychosocial  Outcome: Progressing Towards Goal  Goal: *Verbalizes anxiety and depression are reduced or absent  Outcome: Progressing Towards Goal  Goal: *Absence of aspiration  Outcome: Progressing Towards Goal  Goal: *Absence of deep venous thrombosis signs and symptoms(Stroke Metric)  Outcome: Progressing Towards Goal  Goal: *Optimal pain control at patient's stated goal  Outcome: Progressing Towards Goal  Goal: *Tolerating diet  Outcome: Progressing Towards Goal  Goal: *Ability to perform ADLs and demonstrates progressive mobility and function  Outcome: Progressing Towards Goal  Goal: *Stroke education continued(Stroke Metric)  Outcome: Progressing Towards Goal     Problem: Pain  Goal: *Control of Pain  Outcome: Progressing Towards Goal     Problem: Patient Education: Go to Patient Education Activity  Goal: Patient/Family Education  Outcome: Progressing Towards Goal

## 2019-12-31 LAB
GLUCOSE BLD STRIP.AUTO-MCNC: 115 MG/DL (ref 65–100)
GLUCOSE BLD STRIP.AUTO-MCNC: 134 MG/DL (ref 65–100)
GLUCOSE BLD STRIP.AUTO-MCNC: 164 MG/DL (ref 65–100)
GLUCOSE BLD STRIP.AUTO-MCNC: 99 MG/DL (ref 65–100)

## 2019-12-31 PROCEDURE — 74011250636 HC RX REV CODE- 250/636: Performed by: INTERNAL MEDICINE

## 2019-12-31 PROCEDURE — 82962 GLUCOSE BLOOD TEST: CPT

## 2019-12-31 PROCEDURE — 92507 TX SP LANG VOICE COMM INDIV: CPT

## 2019-12-31 PROCEDURE — 97530 THERAPEUTIC ACTIVITIES: CPT

## 2019-12-31 PROCEDURE — 74011250637 HC RX REV CODE- 250/637: Performed by: INTERNAL MEDICINE

## 2019-12-31 PROCEDURE — 74011250637 HC RX REV CODE- 250/637: Performed by: HOSPITALIST

## 2019-12-31 PROCEDURE — 74011636637 HC RX REV CODE- 636/637: Performed by: INTERNAL MEDICINE

## 2019-12-31 PROCEDURE — 65660000000 HC RM CCU STEPDOWN

## 2019-12-31 PROCEDURE — 65270000029 HC RM PRIVATE

## 2019-12-31 PROCEDURE — 97164 PT RE-EVAL EST PLAN CARE: CPT

## 2019-12-31 PROCEDURE — 74011250637 HC RX REV CODE- 250/637: Performed by: PHYSICIAN ASSISTANT

## 2019-12-31 RX ORDER — METOPROLOL TARTRATE 50 MG/1
50 TABLET ORAL 2 TIMES DAILY
Status: DISCONTINUED | OUTPATIENT
Start: 2019-12-31 | End: 2020-01-07

## 2019-12-31 RX ADMIN — GUAIFENESIN 100 MG: 100 SOLUTION ORAL at 21:31

## 2019-12-31 RX ADMIN — Medication 10 ML: at 21:32

## 2019-12-31 RX ADMIN — METOPROLOL TARTRATE 50 MG: 50 TABLET, FILM COATED ORAL at 09:36

## 2019-12-31 RX ADMIN — Medication 5 ML: at 15:05

## 2019-12-31 RX ADMIN — ACETAMINOPHEN 650 MG: 325 TABLET, FILM COATED ORAL at 16:04

## 2019-12-31 RX ADMIN — Medication 5 ML: at 04:48

## 2019-12-31 RX ADMIN — ATORVASTATIN CALCIUM 80 MG: 80 TABLET, FILM COATED ORAL at 21:31

## 2019-12-31 RX ADMIN — INSULIN LISPRO 2 UNITS: 100 INJECTION, SOLUTION INTRAVENOUS; SUBCUTANEOUS at 12:24

## 2019-12-31 RX ADMIN — INSULIN GLARGINE 10 UNITS: 100 INJECTION, SOLUTION SUBCUTANEOUS at 21:34

## 2019-12-31 RX ADMIN — AMLODIPINE BESYLATE 10 MG: 10 TABLET ORAL at 09:36

## 2019-12-31 RX ADMIN — LISINOPRIL 40 MG: 20 TABLET ORAL at 09:36

## 2019-12-31 RX ADMIN — ASPIRIN 81 MG 81 MG: 81 TABLET ORAL at 09:36

## 2019-12-31 RX ADMIN — ENOXAPARIN SODIUM 40 MG: 40 INJECTION SUBCUTANEOUS at 15:04

## 2019-12-31 RX ADMIN — PANTOPRAZOLE SODIUM 40 MG: 40 TABLET, DELAYED RELEASE ORAL at 04:48

## 2019-12-31 NOTE — PROGRESS NOTES
Hospitalist Note     Admit Date:  2019  9:07 AM   Name:  Daina Pinzon   Age:  53 y.o.  :  1975   MRN:  [id number]   PCP:  Juan Soares MD    HPI: Mr. Teresa Christine is a 52 y/o smoker with DM, HTN, who presented to ER  with L leg and arm weakness, L facial droop, dysarthria. First noted L leg weakness late night  when he woke up to go to the bathroom. He was normal when he went to bed around 1030. EMS called. Noted to have slurred speech and L facial droop as well. Code S called in ER around 9am.  MRI with acute infarcts in L cerebellar hemisphere, deep frontoparietal white matter, and R paramedian yariel. Also noted old lacunar infarcts. No large vessel occlusion on CTA, but some stenosis noted. He was started on asa, statins. Neurology consulted. An ECHO showed PFO. Plan for cardiology referral and evaluate for closure as outpatient. PT/OT suggested inpatient rehabilitation. The patient is uninsured and the only option at this moment is [facility]. Subjective:   Pt seen and examined this morning. Pt fell yesterday. States he slid out of chair, was trying to get out of chair on his own. Denies hitting head, states hit bottom. Imaging negative for fx. Bradycardia now resolved, can increase metoprolol. No new complaints today. Wife asking if he qualifies for disability.      Objective:     Patient Vitals for the past 24 hrs:   Temp Pulse Resp BP SpO2   19 0447    (!) 144/93    19 0400 98 °F (36.7 °C) 74 17 (!) 182/95 98 %   19 0000 97.7 °F (36.5 °C) (!) 57 17 138/83 97 %   19 2000 97.6 °F (36.4 °C) 84 17 (!) 147/91 97 %   19 1600 97.7 °F (36.5 °C) 69 20 131/84 100 %   19 1230 98.2 °F (36.8 °C) 71 20 128/72 99 %   19 1200 97.7 °F (36.5 °C) 65 19 118/77 100 %   19 0800 98.5 °F (36.9 °C) 93 18 137/88 97 %     Oxygen Therapy  O2 Sat (%): 98 % (19 0400)  Pulse via Oximetry: 73 beats per minute (19 1615)  No intake or output data in the 24 hours ending 12/31/19 0744      Physical Exam:  General:    Well nourished. Alert and oriented. CV:   RRR. No murmur, rub, or gallop. Lungs:  Clear to auscultation bilaterally. No wheezing, rhonchi, or rales  Extremities: Warm and dry. No cyanosis or edema. Neurologic: CN II-XII intact except for mild L facial droop. Sensation intact. PERRLA.  dysarthria. No confusion. STR 1/5 LUE and LLE. Dysarthria noted. Skin:     No rashes or jaundice. No wounds. Psych:  Appears depressed    I reviewed the labs, imaging, EKGs, telemetry, and other studies done this admission.     Data Review:   Recent Results (from the past 24 hour(s))   GLUCOSE, POC    Collection Time: 12/30/19 12:08 PM   Result Value Ref Range    Glucose (POC) 148 (H) 65 - 100 mg/dL   GLUCOSE, POC    Collection Time: 12/30/19  4:29 PM   Result Value Ref Range    Glucose (POC) 115 (H) 65 - 100 mg/dL   GLUCOSE, POC    Collection Time: 12/30/19  9:24 PM   Result Value Ref Range    Glucose (POC) 121 (H) 65 - 100 mg/dL       All Micro Results     None          Current Facility-Administered Medications   Medication Dose Route Frequency    metoprolol tartrate (LOPRESSOR) tablet 50 mg  50 mg Oral BID    polyethylene glycol (MIRALAX) packet 17 g  17 g Oral DAILY PRN    insulin glargine (LANTUS) injection 10 Units  10 Units SubCUTAneous QHS    pantoprazole (PROTONIX) tablet 40 mg  40 mg Oral ACB    guaiFENesin (ROBITUSSIN) 100 mg/5 mL oral liquid 100 mg  100 mg Oral Q4H PRN    hydrALAZINE (APRESOLINE) 20 mg/mL injection 20 mg  20 mg IntraVENous Q4H PRN    atorvastatin (LIPITOR) tablet 80 mg  80 mg Oral QHS    amLODIPine (NORVASC) tablet 10 mg  10 mg Oral DAILY    lisinopril (PRINIVIL, ZESTRIL) tablet 40 mg  40 mg Oral DAILY    sodium chloride (NS) flush 5-40 mL  5-40 mL IntraVENous Q8H    sodium chloride (NS) flush 5-40 mL  5-40 mL IntraVENous PRN    ondansetron (ZOFRAN) injection 4 mg  4 mg IntraVENous Q4H PRN    aspirin chewable tablet 81 mg  81 mg Oral DAILY    acetaminophen (TYLENOL) tablet 650 mg  650 mg Oral Q4H PRN    enoxaparin (LOVENOX) injection 40 mg  40 mg SubCUTAneous Q24H    insulin lispro (HUMALOG) injection   SubCUTAneous AC&HS    dextrose 40% (GLUTOSE) oral gel 1 Tube  15 g Oral PRN    glucagon (GLUCAGEN) injection 1 mg  1 mg IntraMUSCular PRN    dextrose (D50W) injection syrg 12.5-25 g  25-50 mL IntraVENous PRN       Other Studies:  Xr Hips Bi W Pelv 3 Or 4 Vws    Result Date: 2019  EXAM:  XR HIPS BI W PELV 3 OR 4 VWS INDICATION:   pt fell from chair, cannot remember which side he hit COMPARISON: None. FINDINGS: An AP view of the pelvis and a frogleg lateral view of the bilateral hip demonstrate no fracture, dislocation or other abnormality. IMPRESSION:  Normal bilateral hip. Results for orders placed or performed during the hospital encounter of 19   2D ECHO COMPLETE ADULT (TTE) [address]  [address] [facility], [address] [facility]  [phone]    Transthoracic Echocardiogram  2D, M-mode, Doppler, and Color Doppler    Patient: Sarita Calderón  MR #: [id number]  : 1975  Age: 53 years  Gender: Male  Study date: 12-Dec-2019  Account #: [de-identified]  Height: 66 in  Weight: 209.7 lb  BSA: 2.04 mï¾²  Status:Routine  Location: ER03  BP: 133/ 76    Allergies: NO KNOWN ALLERGENS    Sonographer:  Bola Reynaga [facility]  Group:  [facility]  Referring Physician:  Phoenix Slaughter. Aris Pascual MD  Reading Physician:  Ca Edwards MD [facility] [place]    INDICATIONS: CVA    PROCEDURE: This was a routine study. A transthoracic echocardiogram was  performed. The study included complete 2D imaging, M-mode, complete spectral  Doppler, and color Doppler. Intravenous contrast (Definity) was administered. Image quality was adequate. LEFT VENTRICLE: Size was normal. Systolic function was normal. Ejection  fraction was estimated in the range of 65 % to 70 %. There were no regional  wall motion abnormalities. There was mild concentric hypertrophy. Left  ventricular diastolic function parameters were normal. Avg E/e': 11.05.    RIGHT VENTRICLE: The size was normal. Systolic function was normal. The  tricuspid jet envelope definition was inadequate for estimation of RV   systolic  pressure. LEFT ATRIUM: Size was normal.    ATRIAL SEPTUM: Agitated saline contrast injection (bubble study) was   performed. There was a right-to-left shunt, in the baseline state. RIGHT ATRIUM: Size was normal.    SYSTEMIC VEINS: IVC: The inferior vena cava was normal in size and course. The  respirophasic change in diameter was more than 50%. AORTIC VALVE: The valve was trileaflet. Leaflets exhibited mild sclerosis. There was no evidence for stenosis. There was no insufficiency. MITRAL VALVE: Valve structure was normal. There was no evidence for stenosis. There was no regurgitation. TRICUSPID VALVE: The valve structure was normal. There was no evidence for  stenosis. There was trivial regurgitation. PULMONIC VALVE: Not well visualized. There was no evidence for stenosis. There  was no insufficiency. PERICARDIUM: There was no pericardial effusion. AORTA: The root exhibited normal size. SUMMARY:    -  Left ventricle: Systolic function was normal. Ejection fraction was  estimated in the range of 65 % to 70 %. There were no regional wall motion  abnormalities. There was mild concentric hypertrophy. -  Atrial septum: Agitated saline contrast injection (bubble study) was  performed. There was a right-to-left shunt, in the baseline state. -  Inferior vena cava, hepatic veins: The respirophasic change in diameter   was  more than 50%.     SYSTEM MEASUREMENT TABLES    2D mode  AoR Diam (2D): 2.8 cm  LA Dimension (2D): 3.5 cm  Left Atrium Systolic Volume Index; Method of Disks, Biplane; 2D mode;: 26   ml/m2  IVS/LVPW (2D): 1  IVSd (2D): 1.2 cm  LVIDd (2D): 5.2 cm  LVIDs (2D): 2.9 cm  LVOT Area (2D): 2.3 cm2  LVPWd (2D): 1.2 cm  RVIDd (2D): 2.2 cm    Tissue Doppler Imaging  LV Peak Early Thomas Tissue Rc; Lateral MA (TDI): 8.1 cm/s  LV Peak Early Thomas Tissue Rc; Medial MA (TDI): 7.6 cm/s    Unspecified Scan Mode  Peak Grad; Mean; Antegrade Flow: 14 mm[Hg]  Vmax; Antegrade Flow: 185 cm/s  LVOT Diam: 1.7 cm  MV Peak Rc/LV Peak Tissue Rc E-Wave; Lateral MA: 10.7  MV Peak Rc/LV Peak Tissue Rc E-Wave; Medial MA: 11.4    Prepared and signed by    Sabi Muller. Kat Shelby MD [facility]  Signed 12-Dec-2019 15:46:58             Assessment and Plan:   Principal Problem:    Acute embolic stroke - continues to have L sided neglect. Appreciate physiatry consultation. Cont statin, ASA, PT/OT/ST. Needs outpatient cardiology follow-up for event monitor and PFO closure. Active Problems:    Hypertension - relatively well controlled on Norvasc and metoprolol. Metoprolol dose was reduced due to persistent bradycardia and nursing having to hold medication, he is now tachycardic and BP elevated, will increase dose to 50mg BID. HR in 50s is acceptable. Type 2 diabetes mellitus - cont on Lantus and SSI      Arrhythmia - had brief wide complex tachycardia early in admission, will need event monitor as outpt per cards      Hyperlipemia - statin therapy      PFO (patent foramen ovale) - will need closure as outpatient per cardiology      Ppx: Lovenox for VTE    Code Status: FULL CODE    Disposition: patient has been medically clear for discharge but is without insurance, needs rehab/placement. Awaiting Medicaid approval for inpatient rehab. Per previous notes, does have plans for first floor apartment once able to discharge to home. Cm following.      Signed:  ELAINE De Luna

## 2019-12-31 NOTE — PROGRESS NOTES
Problem: Pressure Injury - Risk of  Goal: *Prevention of pressure injury  Description  Document Dewey Scale and appropriate interventions in the flowsheet.   Outcome: Progressing Towards Goal  Note: Pressure Injury Interventions:  Sensory Interventions: Assess changes in LOC, Assess need for specialty bed, Avoid rigorous massage over bony prominences, Check visual cues for pain, Discuss PT/OT consult with provider, Float heels, Keep linens dry and wrinkle-free, Minimize linen layers, Maintain/enhance activity level, Pressure redistribution bed/mattress (bed type), Use 30-degree side-lying position    Moisture Interventions: Absorbent underpads, Apply protective barrier, creams and emollients, Assess need for specialty bed, Check for incontinence Q2 hours and as needed, Maintain skin hydration (lotion/cream), Minimize layers, Moisture barrier, Offer toileting Q_hr    Activity Interventions: Increase time out of bed, Pressure redistribution bed/mattress(bed type), PT/OT evaluation    Mobility Interventions: HOB 30 degrees or less, Pressure redistribution bed/mattress (bed type), PT/OT evaluation    Nutrition Interventions: Document food/fluid/supplement intake, Offer support with meals,snacks and hydration    Friction and Shear Interventions: Apply protective barrier, creams and emollients, Feet elevated on foot rest, Foam dressings/transparent film/skin sealants, HOB 30 degrees or less, Minimize layers                Problem: Patient Education: Go to Patient Education Activity  Goal: Patient/Family Education  Outcome: Progressing Towards Goal

## 2019-12-31 NOTE — PROGRESS NOTES
12/31/19 0721   NIH Stroke Scale   Interval Other (comment)  (Dual NIH with Sarahy Cardenas RN )   LOC 0   LOC Questions 0   LOC Commands 0   Best Gaze 0   Visual 0   Facial Palsy 2   Motor Right Arm 0   Motor Left Arm 4   Motor Right Leg 0   Motor Left Leg 4   Limb Ataxia 0   Sensory 0   Best Language 0   Dysarthria 1   Extinction and Inattention 0   Total 11

## 2019-12-31 NOTE — PROGRESS NOTES
Problem: Pressure Injury - Risk of  Goal: *Prevention of pressure injury  Description  Document Dewey Scale and appropriate interventions in the flowsheet. Outcome: Progressing Towards Goal  Note: Pressure Injury Interventions:  Sensory Interventions: Assess changes in LOC, Assess need for specialty bed, Check visual cues for pain, Discuss PT/OT consult with provider, Maintain/enhance activity level, Pressure redistribution bed/mattress (bed type), Suspension boots, Turn and reposition approx. every two hours (pillows and wedges if needed)    Moisture Interventions: Absorbent underpads, Apply protective barrier, creams and emollients, Assess need for specialty bed, Check for incontinence Q2 hours and as needed, Limit adult briefs    Activity Interventions: Increase time out of bed, Pressure redistribution bed/mattress(bed type), PT/OT evaluation    Mobility Interventions: HOB 30 degrees or less, Pressure redistribution bed/mattress (bed type), PT/OT evaluation, Suspension boots, Turn and reposition approx. every two hours(pillow and wedges)    Nutrition Interventions: Document food/fluid/supplement intake    Friction and Shear Interventions: Apply protective barrier, creams and emollients, Foam dressings/transparent film/skin sealants                Problem: Patient Education: Go to Patient Education Activity  Goal: Patient/Family Education  Outcome: Progressing Towards Goal     Problem: Falls - Risk of  Goal: *Absence of Falls  Description  Document Connor Giancarlo Fall Risk and appropriate interventions in the flowsheet.   Outcome: Progressing Towards Goal  Note: Fall Risk Interventions:  Mobility Interventions: Bed/chair exit alarm, OT consult for ADLs, Patient to call before getting OOB, PT Consult for mobility concerns    Mentation Interventions: Adequate sleep, hydration, pain control, Bed/chair exit alarm, Door open when patient unattended, Reorient patient, More frequent rounding    Medication Interventions: Bed/chair exit alarm, Patient to call before getting OOB, Evaluate medications/consider consulting pharmacy, Teach patient to arise slowly    Elimination Interventions: Bed/chair exit alarm, Call light in reach, Patient to call for help with toileting needs, Stay With Me (per policy), Toileting schedule/hourly rounds    History of Falls Interventions: Bed/chair exit alarm, Door open when patient unattended, Investigate reason for fall         Problem: Patient Education: Go to Patient Education Activity  Goal: Patient/Family Education  Outcome: Progressing Towards Goal     Problem: Diabetes Self-Management  Goal: *Disease process and treatment process  Description  Define diabetes and identify own type of diabetes; list 3 options for treating diabetes. Outcome: Progressing Towards Goal  Goal: *Incorporating nutritional management into lifestyle  Description  Describe effect of type, amount and timing of food on blood glucose; list 3 methods for planning meals. Outcome: Progressing Towards Goal  Goal: *Incorporating physical activity into lifestyle  Description  State effect of exercise on blood glucose levels. Outcome: Progressing Towards Goal  Goal: *Developing strategies to promote health/change behavior  Description  Define the ABC's of diabetes; identify appropriate screenings, schedule and personal plan for screenings. Outcome: Progressing Towards Goal  Goal: *Using medications safely  Description  State effect of diabetes medications on diabetes; name diabetes medication taking, action and side effects. Outcome: Progressing Towards Goal  Goal: *Monitoring blood glucose, interpreting and using results  Description  Identify recommended blood glucose targets  and personal targets.   Outcome: Progressing Towards Goal  Goal: *Prevention, detection, treatment of acute complications  Description  List symptoms of hyper- and hypoglycemia; describe how to treat low blood sugar and actions for lowering  high blood glucose level. Outcome: Progressing Towards Goal  Goal: *Prevention, detection and treatment of chronic complications  Description  Define the natural course of diabetes and describe the relationship of blood glucose levels to long term complications of diabetes.   Outcome: Progressing Towards Goal  Goal: *Developing strategies to address psychosocial issues  Description  Describe feelings about living with diabetes; identify support needed and support network  Outcome: Progressing Towards Goal     Problem: Patient Education: Go to Patient Education Activity  Goal: Patient/Family Education  Outcome: Progressing Towards Goal     Problem: General Medical Care Plan  Goal: *Vital signs within specified parameters  Outcome: Progressing Towards Goal  Goal: *Labs within defined limits  Outcome: Progressing Towards Goal  Goal: *Absence of infection signs and symptoms  Description  Wash hand more often   Outcome: Progressing Towards Goal  Goal: *Optimal pain control at patient's stated goal  Outcome: Progressing Towards Goal  Goal: *Skin integrity maintained  Outcome: Progressing Towards Goal  Goal: *Fluid volume balance  Outcome: Progressing Towards Goal  Goal: *Optimize nutritional status  Outcome: Progressing Towards Goal  Goal: *Anxiety reduced or absent  Outcome: Progressing Towards Goal  Goal: *Progressive mobility and function (eg: ADL's)  Outcome: Progressing Towards Goal     Problem: Patient Education: Go to Patient Education Activity  Goal: Patient/Family Education  Outcome: Progressing Towards Goal     Problem: Patient Education: Go to Patient Education Activity  Goal: Patient/Family Education  Outcome: Progressing Towards Goal     Problem: TIA/CVA Stroke: Day 2 Until Discharge  Goal: Activity/Safety  Outcome: Progressing Towards Goal  Goal: Diagnostic Test/Procedures  Outcome: Progressing Towards Goal  Goal: Nutrition/Diet  Outcome: Progressing Towards Goal  Goal: Discharge Planning  Outcome: Progressing Towards Goal  Goal: Medications  Outcome: Progressing Towards Goal  Goal: Respiratory  Outcome: Progressing Towards Goal  Goal: Treatments/Interventions/Procedures  Outcome: Progressing Towards Goal  Goal: Psychosocial  Outcome: Progressing Towards Goal  Goal: *Verbalizes anxiety and depression are reduced or absent  Outcome: Progressing Towards Goal  Goal: *Absence of aspiration  Outcome: Progressing Towards Goal  Goal: *Absence of deep venous thrombosis signs and symptoms(Stroke Metric)  Outcome: Progressing Towards Goal  Goal: *Optimal pain control at patient's stated goal  Outcome: Progressing Towards Goal  Goal: *Tolerating diet  Outcome: Progressing Towards Goal  Goal: *Ability to perform ADLs and demonstrates progressive mobility and function  Outcome: Progressing Towards Goal  Goal: *Stroke education continued(Stroke Metric)  Outcome: Progressing Towards Goal     Problem: Pain  Goal: *Control of Pain  Outcome: Progressing Towards Goal     Problem: Patient Education: Go to Patient Education Activity  Goal: Patient/Family Education  Outcome: Progressing Towards Goal

## 2019-12-31 NOTE — PROGRESS NOTES
Neurolinguistic Goals:  LTG: Patient will increase neuro-linguistic abilities to increase safety and awareness in functional living environment   STG: Patient will participate in speech/language/cognitive evaluation.  MET 12/17/19  STG: Patient will solve mathematical problems with 80%accuracy given minimal cueing   STG: Patient will name 10 items to concrete category in 1 minute given minimal cueing  STG: Patient will participate in verbal reasoning tasks with 80% accuracy given minimal cueing  STG: Patient will complete mental manipulation tasks with 80% accuracy given minimal cueing  STG: Patient will complete working memory/attention tasks with 80% accuracy given minimal cueing  STG: Patient will recall relevant verbal information with 80% accuracy with minimal assistance  STG: Patient will utilize memory compensatory strategies to improve recall of functional list/message with 90%accuracy given minimal assistance  STG: Patient will participate in ongoing cognitive linguistic evaluation for therapeutic assessment      Dysarthria Goals:  LTG: Patient will develop functional and intelligible speech and utilize compensatory strategies to improve communication across environments  STG: Patient will fill in carrier phrase/complete automatic speech tasks with 95% accuracy given minimal cueing  STG: Patient will repeat phrases, sentences with 85% accuracy given minimal cueing  STG: Patient will utilize compensatory strategies across tasks with 90% accuracy given minimal cueing    SPEECH LANGUAGE PATHOLOGY: SPEECH-LANGUAGE/COGNITION: Daily Note 5    NAME/AGE/GENDER: Joe Silveira is a 53 y.o. male  DATE: 12/31/2019  PRIMARY DIAGNOSIS: Acute ischemic stroke ([facility] [address] [I63.9]  Cerebrovascular accident (CVA) due to embolism ([facility] [address] [I63.9]      ICD-10: Treatment Diagnosis: R47.1 Dysarthria and Anarthria  R41.841 Cognitive-Communication Deficit    INTERDISCIPLINARY COLLABORATION: Registered Nurse  PRECAUTIONS/ALLERGIES: Patient has no known allergies. SUBJECTIVE   Alert upright in bed for session. Problem List:  (Impairments causing functional limitations):  1. Cognitive linguistic deficits  2. Dysarthria     Orientation:   Person  Place  Time  Situation     Pain: Pain Scale 1: Numeric (0 - 10)  Pain Intensity 1: 0     OBJECTIVE   The following treatment tasks were completed:   Problem solving: identifying appropriate solution to daily problems presented verbally with 5/5 accurate given min-mod verba cues  Reasoning for math: basic money calculations 3/4 accurate given min-mod cues  Basic time calculations 2/4 accurate given moderate verbal cues  Divergent naming: named average 9 items in concrete category in 1 minute across 2 trials. ASSESSMENT   Patient continues to present with moderate cognitive linguistic impairment and moderate dysarthria. Poor awareness of reduced speech intelligibility in structured tasks this date as patient did not attempt to self correct when speech was unintelligible. Patient continues to have difficulty initiating speech at times. Suspect apraxia component also impacting speech production. Patient with grossly intact simple verbal problem solving. Moderately impaired reasoning skills for daily math and time calculations. Patient will benefit from ongoing skilled intervention to improve speech intelligibility and cognitive linguistic skills. Tool Used: MODIFIED BRITT SCALE (mRS)   Score   No Symptoms  [] 0   No significant disability despite symptoms; able to carry out all usual duties and activities  [] 1   Slight disability; unable to carry out all previous activities but able to look after own affairs without assistance.    [] 2   Moderate disability; requiring some help but able to walk without assistance  [] 3   Moderately severe disability; unable to walk without assistance and unable to attend to own bodily needs without assistance  [] 4   Severe disability; bedridden, incontinent, and requiring constant nursing care and attention  [] 5      Score:  Initial: 2 Most Recent: 2  (Date 12/31/19 )   Interpretation of Tool: The Modified [place] Scale is a 7-point scaled used to quantify level of disability as it relates to a patient's functional abilities. PLAN    FREQUENCY/DURATION: Continue to follow patient 3 times a week for duration of hospital stay to address above goals. - Recommendations for next treatment session: Next treatment will address cognitive linguistic and dysarthria treatment   REHABILITATION POTENTIAL FOR STATED GOALS: Excellent           RECOMMENDATIONS   STRATEGIES:   Dysarthria strategies  Memory strategies      EDUCATION:  · Recommendations discussed with   · Patient     RECOMMENDATIONS for CONTINUED SPEECH THERAPY: YES: Anticipate need for ongoing speech therapy during this hospitalization and at next level of care. Compliance with Program/Exercises: Will assess as treatment progresses  Continuation of Skilled Services/Medical Necessity:   Patient is expected to demonstrate progress in cognitive ability and dysarthria to decrease assistance required communication, increase independence with activities of daily living and increase communication with family/caregivers.  Patient continues to require skilled intervention due to dysarthria and cognitive linguistic deficits.      SAFETY:  After treatment position/precautions:  · Upright in bed  · Wife at bedside  · Call light within reach    Total Treatment Duration:   Time In: 1119  Time Out: [address], [address] [facility]

## 2019-12-31 NOTE — PROGRESS NOTES
12/30/19 1901   NIH Stroke Scale   Interval Other (comment)   LOC 0   LOC Questions 0   LOC Commands 0   Best Gaze 0   Visual 0   Facial Palsy 2   Motor Right Arm 0   Motor Left Arm 4   Motor Right Leg 0   Motor Left Leg 4   Limb Ataxia 0   Sensory 0   Best Language 0   Dysarthria 1   Extinction and Inattention 0   Total 11

## 2019-12-31 NOTE — PROGRESS NOTES
Problem: Mobility Impaired (Adult and Pediatric)  Goal: *Acute Goals and Plan of Care  Description  Goals adjusted 12/31/19  STG:  (1.) Mr. Karlos Alvarenga will move from supine to sit and sit to supine , scoot up and down and roll side to side with MINIMAL ASSIST within 3 treatment day(s). (2.) Mr. Karlos Alvarenga will transfer from bed to chair and chair to bed with MODERATE ASSIST using the least restrictive device within 3 treatment day(s). (3.) Mr. Karlos Alvarenga will perform standing static and dynamic balance activities x 10 minutes with MODERATE ASSISTx1 to improve safety within 3 treatment day(s). LTG:  (1.) Mr. Karlos Alvarenga will move from supine to sit and sit to supine , scoot up and down and roll side to side with CONTACT GUARD ASSIST within 7 treatment day(s). (2.) Mr. Karlos Alvarenga will transfer from bed to chair and chair to bed with MINIMAL ASSIST using the least restrictive device within 7 treatment day(s). (3.) Mr. Karlos Alvarenga will perform standing static and dynamic balance activities x 20 minutes with MINIMAL ASSIST to improve safety within 7 treatment day(s). (4.) Mr. Karlos Alvarenga will perform bilateral lower extremity exercises x 15 min for HEP with SUPERVISION to improve strength, endurance, and functional mobility within 7 treatment day(s).      PHYSICAL THERAPY: Daily Note and PM 12/31/2019  INPATIENT: PT Visit Days : 1  Payor: MEDICAID PENDING / Plan: Ron Ganser PENDING / Product Type: Medicaid /       NAME/AGE/GENDER: Britney Burgess is a 53 y.o. male   PRIMARY DIAGNOSIS: Acute ischemic stroke ([address] [I63.9]  Cerebrovascular accident (CVA) due to embolism ([address] [D40.6] Acute embolic stroke ([address] Acute embolic stroke ([address]       ICD-10: Treatment Diagnosis:   · Other lack of cordination (R27.8)  · Difficulty in walking, Not elsewhere classified (R26.2)  · Other abnormalities of gait and mobility (R26.89)  · Unspecified Lack of Coordination (R27.9)  · Hemiplegia and hemiparesis following cerebral infarction affecting   · left non-dominant side (I78.007)   Precaution/Allergies:  Patient has no known allergies. ASSESSMENT:     Mr. Luc Gómez is a 53year old M who presents to hospital with L sided facial droop and LUE and LLE weakness. MRI indicated acute infarcts in L cerebellar hemisphere, deep frontoparietal white matter and R paramedian yariel. Prior to hospital admission pt lives with his wife in a one story apartment on the second level with 12 steps to access. Pt endorses no falls in past 6 months. Prior to admission Mr. Luc Gómez uses no DME for mobility. He reports he had been working full time but got laid off, so now he works just a few days at [employer], which requires him being on his feet. Upon entering pt resting in bed. His speech seems to be getting somewhat better. Today we worked on functional mobility having him help kick his L leg off the EOB with his R leg with sidelying to sit. Encouraged him to sit up from sidelying using his R arm as much as possible but still requiring moderate assist for this. His sitting balance has improved overall, he was able to hold self in balanced sitting for about 12min and only asst was using L foot to force IR of L hip holding in squared position. He was also able to sit unsupported of R hand reaching fwd to RN when giving pt meds. Unable to trnf to chair with this visit due to needing 2nd asst.   Performed reeval and adjusted goals. Cont with original POC. At this time, patient is appropriate for Co-treatment with occupational therapy due to patient's decreased overall endurance/tolerance levels, as well as need for high level skilled assistance to complete functional transfers/mobility and functional tasks. He is appropriate for a multidisciplinary co-treatment of PT and OT to address goals of both disciplines. * when patient in recliner, recline legs.   He fell out of the chair trying to pick something up on the floor*    This section established at most recent assessment   PROBLEM LIST (Impairments causing functional limitations):  1. Decreased Strength  2. Decreased ADL/Functional Activities  3. Decreased Transfer Abilities  4. Decreased Ambulation Ability/Technique  5. Decreased Balance  6. Increased Pain  7. Decreased Activity Tolerance  8. Increased Fatigue  9. Decreased Flexibility/Joint Mobility   INTERVENTIONS PLANNED: (Benefits and precautions of physical therapy have been discussed with the patient.)  1. Balance Exercise  2. Bed Mobility  3. Family Education  4. Gait Training  5. Home Exercise Program (HEP)  6. Manual Therapy  7. Neuromuscular Re-education/Strengthening  8. Range of Motion (ROM)  9. Therapeutic Activites  10. Therapeutic Exercise/Strengthening  11. Transfer Training     TREATMENT PLAN: Frequency/Duration: 3 times a week for duration of hospital stay  Rehabilitation Potential For Stated Goals: [facility] (at time of discharge pending progress):    Placement: It is my opinion, based on this patient's performance to date, that Mr. Elvin Farooq may benefit from intensive therapy at an [address] [place] [address] after discharge due to a probable need for 24 hour rehab nursing, a probable need for multiple therapy disciplines, and potential to make ongoing and sustainable functional improvement that is of practical value. .  Equipment:    TBD pending progress with therapy. HISTORY:   History of Present Injury/Illness (Reason for Referral):  Per H&P: \"Pt is a 52 y/o smoker with DM, HTN, who presented to ER with L leg and arm weakness, L facial droop, dysarthria. First noted L leg weakness late night 12/11 when he woke up to go to the bathroom. He was normal when he went to bed around 1030. Woke up this morning and had persistent weakness L leg and also now noted in L arm. EMS called. Noted to have slurred speech and L facial droop as well.   Code S called in ER around 9am.  MRI with acute infarcts in L cerebellar hemisphere, deep frontoparietal white matter, and R paramedian yariel. Also noted old lacunar infarcts. No large vessel occlusion on CTA, but some stenosis noted. No hx afib, TIA, CVA. No CP, palpitations, SOB. \"  Past Medical History/Comorbidities:   Mr. Yvette Lindsey  has a past medical history of Acute ischemic stroke ([address] (12/12/2019), Acute pancreatitis (11/19/2014), Cerebrovascular accident (CVA) due to embolism ([address] (12/12/2019), Diabetes ([address] (2002), Diabetes ([address] Diabetes mellitus, and Hypertension. He also has no past medical history of Arthritis, Asthma, Autoimmune disease ([address] CAD (coronary artery disease), Cancer ([address] Chronic kidney disease, COPD, Dementia, Dementia ([address] Heart failure ([address] Ill-defined condition, Infectious disease, Liver disease, Other ill-defined conditions(799.89), Psychiatric disorder, PUD (peptic ulcer disease), Seizures ([address] or Sleep disorder. Mr. Yvette Lindsey  has a past surgical history that includes hx hernia repair and hx orthopaedic. Social History/Living Environment:   Home Environment: Apartment  # Steps to Enter: 12  Rails to Enter: Yes  [employer] : Bilateral  One/Two Story Residence: One story  Living Alone: No  Support Systems: Spouse/Significant Other/Partner  Patient Expects to be Discharged to[de-identified] Unknown  Current DME Used/Available at Home: None  Tub or Shower Type: Tub/Shower combination  Prior Level of Function/Work/Activity:  Independent, lives with wife in 2nd story 1 level apartment. No recent falls. Number of Personal Factors/Comorbidities that affect the Plan of Care: 1-2: MODERATE COMPLEXITY   EXAMINATION:   Most Recent Physical Functioning:   Gross Assessment:                  Posture:     Balance:  Sitting: Impaired  Sitting - Static: Fair (occasional)  Sitting - Dynamic: Poor (constant support) Bed Mobility:  Rolling:  Moderate assistance  Supine to Sit: Moderate assistance  Wheelchair Mobility:     Transfers:     Gait:            Body Structures Involved:  1. Nerves  2. Voice/Speech  3. Bones  4. Joints  5. Muscles Body Functions Affected:  1. Mental  2. Sensory/Pain  3. Neuromusculoskeletal  4. Movement Related Activities and Participation Affected:  1. General Tasks and Demands  2. Mobility  3. Self Care  4. Interpersonal Interactions and Relationships   Number of elements that affect the Plan of Care: 4+: HIGH COMPLEXITY   CLINICAL PRESENTATION:   Presentation: Evolving clinical presentation with changing clinical characteristics: MODERATE COMPLEXITY   CLINICAL DECISION MAKIN [facility] Inpatient Short Form  How much difficulty does the patient currently have. .. Unable A Lot A Little None   1. Turning over in bed (including adjusting bedclothes, sheets and blankets)? [] 1   [] 2   [x] 3   [] 4   2. Sitting down on and standing up from a chair with arms ( e.g., wheelchair, bedside commode, etc.)   [] 1   [x] 2   [] 3   [] 4   3. Moving from lying on back to sitting on the side of the bed? [] 1   [x] 2   [] 3   [] 4   How much help from another person does the patient currently need. .. Total A Lot A Little None   4. Moving to and from a bed to a chair (including a wheelchair)? [] 1   [x] 2   [] 3   [] 4   5. Need to walk in hospital room? [] 1   [x] 2   [] 3   [] 4   6. Climbing 3-5 steps with a railing? [] 1   [x] 2   [] 3   [] 4   © , [facility] of [address] [place] [address], under license to [employer]. All rights reserved      Score:  Initial: 13 Most Recent: 13 (Date:19 )    Interpretation of Tool:  Represents activities that are increasingly more difficult (i.e. Bed mobility, Transfers, Gait). Medical Necessity:     · Patient is expected to demonstrate progress in   · strength, range of motion, balance, coordination, and functional technique  ·  to   · increase independence with all mobility.    · .  Reason for Services/Other Comments:  · Patient continues to require skilled intervention due to   · medical complications and mobility deficits which impact his level of function, safety, and independence as indicated above. · .   Use of outcome tool(s) and clinical judgement create a POC that gives a: Questionable prediction of patient's progress: MODERATE COMPLEXITY        TREATMENT:   (In addition to Assessment/Re-Assessment sessions the following treatments were rendered)   Pre-treatment Symptoms/Complaints:  \"ok\"  Pain: Initial:   Pain Intensity 1: 3  Pain Location 1: Arm, Leg  Pain Orientation 1: Left  Post Session:  No pain reported at end of session, resting comfortably in bed. REASSESSMENT    Therapeutic Activity: (    25min):  Therapeutic activities including bed mobility, rolling, scooting, sit <> stand transfer training  And stand pivot to the bed to improve mobility, strength, balance, and coordination. Required maximal cues with moderate physical assist   to promote coordination of bilateral, upper extremity(s), lower extremity(s) and promote motor control of bilateral, upper extremity(s), lower extremity(s). Date:  12/24/19 Date:   Date:     Activity/Exercise Parameters Parameters Parameters   Supine heel slides 10x B   PROM L     Supine SLR 10x R     Supine bridging 10x holding L knee bent     Supine LTR 10x ea direction                              Braces/Orthotics/Lines/Etc:   · O2 Device: Room Air   Treatment/Session Assessment:    · Response to Treatment:  see above. Works very hard with therapy. · Interdisciplinary Collaboration:   o Physical Therapist  o Registered Nurse  · After treatment position/precautions:   o Supine in bed  o Bed alarm/tab alert on  o Bed/Chair-wheels locked  o Bed in low position  o Call light within reach  o RN notified   · Compliance with Program/Exercises: Compliant all of the time  · Recommendations/Intent for next treatment session:   \"Next visit will focus on advancements to more challenging activities and reduction in assistance provided\".     Total Treatment Duration:  PT Patient Time In/Time Out  Time In: 1535  Time Out: [address], DPT

## 2020-01-01 LAB
GLUCOSE BLD STRIP.AUTO-MCNC: 103 MG/DL (ref 65–100)
GLUCOSE BLD STRIP.AUTO-MCNC: 106 MG/DL (ref 65–100)
GLUCOSE BLD STRIP.AUTO-MCNC: 107 MG/DL (ref 65–100)
GLUCOSE BLD STRIP.AUTO-MCNC: 136 MG/DL (ref 65–100)

## 2020-01-01 PROCEDURE — 65270000029 HC RM PRIVATE

## 2020-01-01 PROCEDURE — 74011250637 HC RX REV CODE- 250/637: Performed by: PHYSICIAN ASSISTANT

## 2020-01-01 PROCEDURE — 74011250637 HC RX REV CODE- 250/637: Performed by: HOSPITALIST

## 2020-01-01 PROCEDURE — 74011636637 HC RX REV CODE- 636/637: Performed by: INTERNAL MEDICINE

## 2020-01-01 PROCEDURE — 74011250637 HC RX REV CODE- 250/637: Performed by: INTERNAL MEDICINE

## 2020-01-01 PROCEDURE — 65660000000 HC RM CCU STEPDOWN

## 2020-01-01 PROCEDURE — 74011250636 HC RX REV CODE- 250/636: Performed by: INTERNAL MEDICINE

## 2020-01-01 PROCEDURE — 82962 GLUCOSE BLOOD TEST: CPT

## 2020-01-01 RX ADMIN — LISINOPRIL 40 MG: 20 TABLET ORAL at 09:08

## 2020-01-01 RX ADMIN — ASPIRIN 81 MG 81 MG: 81 TABLET ORAL at 09:08

## 2020-01-01 RX ADMIN — Medication 10 ML: at 06:51

## 2020-01-01 RX ADMIN — Medication 10 ML: at 22:04

## 2020-01-01 RX ADMIN — ENOXAPARIN SODIUM 40 MG: 40 INJECTION SUBCUTANEOUS at 14:42

## 2020-01-01 RX ADMIN — METOPROLOL TARTRATE 50 MG: 50 TABLET, FILM COATED ORAL at 09:08

## 2020-01-01 RX ADMIN — AMLODIPINE BESYLATE 10 MG: 10 TABLET ORAL at 09:08

## 2020-01-01 RX ADMIN — Medication 5 ML: at 14:42

## 2020-01-01 RX ADMIN — GUAIFENESIN 100 MG: 100 SOLUTION ORAL at 19:49

## 2020-01-01 RX ADMIN — INSULIN GLARGINE 10 UNITS: 100 INJECTION, SOLUTION SUBCUTANEOUS at 22:04

## 2020-01-01 RX ADMIN — METOPROLOL TARTRATE 50 MG: 50 TABLET, FILM COATED ORAL at 16:31

## 2020-01-01 RX ADMIN — ATORVASTATIN CALCIUM 80 MG: 80 TABLET, FILM COATED ORAL at 22:04

## 2020-01-01 RX ADMIN — PANTOPRAZOLE SODIUM 40 MG: 40 TABLET, DELAYED RELEASE ORAL at 06:51

## 2020-01-01 NOTE — PROGRESS NOTES
01/01/20 0734   NIH Stroke Scale   Interval Other (comment)  (dual nih with Nichelle Drop RN)   LOC 0   LOC Questions 0   LOC Commands 0   Best Gaze 0   Visual 0   Facial Palsy 2   Motor Right Arm 0   Motor Left Arm 4   Motor Right Leg 0   Motor Left Leg 4   Limb Ataxia 0   Sensory 0   Best Language 0   Dysarthria 1   Extinction and Inattention 0   Total 11

## 2020-01-01 NOTE — PROGRESS NOTES
12/31/19 1913   NIH Stroke Scale   Interval Other (comment)  (Shift change with Caterina Lr RN)   LOC 0   LOC Questions 0   LOC Commands 0   Best Gaze 0   Visual 0   Facial Palsy 2   Motor Right Arm 0   Motor Left Arm 4   Motor Right Leg 0   Motor Left Leg 4   Limb Ataxia 0   Sensory 0   Best Language 0   Dysarthria 1   Extinction and Inattention 0   Total 11

## 2020-01-01 NOTE — PROGRESS NOTES
Hospitalist Note     Admit Date:  2019  9:07 AM   Name:  Joe Silveira   Age:  53 y.o.  :  1975   MRN:  [id number]   PCP:  Kristy Avina MD    HPI: Mr. Aristeo Rascon is a 54 y/o smoker with DM, HTN, who presented to ER  with L leg and arm weakness, L facial droop, dysarthria. First noted L leg weakness late night  when he woke up to go to the bathroom. He was normal when he went to bed around 1030. EMS called. Noted to have slurred speech and L facial droop as well. Code S called in ER around 9am.  MRI with acute infarcts in L cerebellar hemisphere, deep frontoparietal white matter, and R paramedian yariel. Also noted old lacunar infarcts. No large vessel occlusion on CTA, but some stenosis noted. He was started on asa, statins. Neurology consulted. An ECHO showed PFO. Plan for cardiology referral and evaluate for closure as outpatient. PT/OT suggested inpatient rehabilitation. The patient is uninsured and the only option at this moment is IRC. Subjective: At bedside in no acute distress. Patient with no complaints at this time. He denies any chest pain or shortness of breath. He denies any issues with bowel movements, dysuria, arthralgias or myalgias. Objective:     Patient Vitals for the past 24 hrs:   Temp Pulse Resp BP SpO2   20 1200 98.1 °F (36.7 °C) 60 18 118/74 95 %   20 0800 98.1 °F (36.7 °C) 72 18 118/76 94 %   20 0400 98 °F (36.7 °C) 79 17 (!) 155/95 91 %   20 0000 97.5 °F (36.4 °C) (!) 58 17 135/80 95 %   19 2000 97.4 °F (36.3 °C) (!) 54 17 129/84 97 %   19 1600 98.3 °F (36.8 °C) (!) 52 18 121/73 97 %     Oxygen Therapy  O2 Sat (%): 95 % (20 1200)  Pulse via Oximetry: 73 beats per minute (19 1615)  No intake or output data in the 24 hours ending 20 5675      Physical Exam:  General:    Well nourished. Alert and oriented. CV:   RRR. No murmur, rub, or gallop. Lungs:  Clear to auscultation bilaterally.   No wheezing, rhonchi, or rales  Extremities: Warm and dry. No cyanosis or edema. Neurologic: CN II-XII intact except for mild L facial droop. Sensation intact. PERRLA.  dysarthria. No                         confusion. STR 1/5 LUE and LLE. Dysarthria noted. Skin:     No rashes or jaundice. No wounds. Psych:  Appears depressed    I reviewed the labs, imaging, EKGs, telemetry, and other studies done this admission.     Data Review:   Recent Results (from the past 24 hour(s))   GLUCOSE, POC    Collection Time: 12/31/19  4:44 PM   Result Value Ref Range    Glucose (POC) 99 65 - 100 mg/dL   GLUCOSE, POC    Collection Time: 12/31/19  9:33 PM   Result Value Ref Range    Glucose (POC) 115 (H) 65 - 100 mg/dL   GLUCOSE, POC    Collection Time: 01/01/20  7:02 AM   Result Value Ref Range    Glucose (POC) 107 (H) 65 - 100 mg/dL   GLUCOSE, POC    Collection Time: 01/01/20 10:32 AM   Result Value Ref Range    Glucose (POC) 136 (H) 65 - 100 mg/dL       All Micro Results     None          Current Facility-Administered Medications   Medication Dose Route Frequency    metoprolol tartrate (LOPRESSOR) tablet 50 mg  50 mg Oral BID    polyethylene glycol (MIRALAX) packet 17 g  17 g Oral DAILY PRN    insulin glargine (LANTUS) injection 10 Units  10 Units SubCUTAneous QHS    pantoprazole (PROTONIX) tablet 40 mg  40 mg Oral ACB    guaiFENesin (ROBITUSSIN) 100 mg/5 mL oral liquid 100 mg  100 mg Oral Q4H PRN    hydrALAZINE (APRESOLINE) 20 mg/mL injection 20 mg  20 mg IntraVENous Q4H PRN    atorvastatin (LIPITOR) tablet 80 mg  80 mg Oral QHS    amLODIPine (NORVASC) tablet 10 mg  10 mg Oral DAILY    lisinopril (PRINIVIL, ZESTRIL) tablet 40 mg  40 mg Oral DAILY    sodium chloride (NS) flush 5-40 mL  5-40 mL IntraVENous Q8H    sodium chloride (NS) flush 5-40 mL  5-40 mL IntraVENous PRN    ondansetron (ZOFRAN) injection 4 mg  4 mg IntraVENous Q4H PRN    aspirin chewable tablet 81 mg  81 mg Oral DAILY    acetaminophen (TYLENOL) tablet 650 mg  650 mg Oral Q4H PRN    enoxaparin (LOVENOX) injection 40 mg  40 mg SubCUTAneous Q24H    insulin lispro (HUMALOG) injection   SubCUTAneous AC&HS    dextrose 40% (GLUTOSE) oral gel 1 Tube  15 g Oral PRN    glucagon (GLUCAGEN) injection 1 mg  1 mg IntraMUSCular PRN    dextrose (D50W) injection syrg 12.5-25 g  25-50 mL IntraVENous PRN       Other Studies:  No results found. Results for orders placed or performed during the hospital encounter of 19   2D ECHO COMPLETE ADULT (TTE) [address] [facility]  [address] [facility], [address] [facility]  [phone]    Transthoracic Echocardiogram  2D, M-mode, Doppler, and Color Doppler    Patient: Tammy Farley  MR #: [id number]  : 1975  Age: 53 years  Gender: Male  Study date: 12-Dec-2019  Account #: [de-identified]  Height: 66 in  Weight: 209.7 lb  BSA: 2.04 mï¾²  Status:Routine  Location: ER03  BP: 133/ 76    Allergies: NO KNOWN ALLERGENS    Sonographer:  LUZ Mota  Group:  [id number] Cardiology  Referring Physician:  Maykel Rod. Jace Castrejon MD  Reading Physician:  Tosin Best MD [facility]    INDICATIONS: CVA    PROCEDURE: This was a routine study. A transthoracic echocardiogram was  performed. The study included complete 2D imaging, M-mode, complete spectral  Doppler, and color Doppler. Intravenous contrast (Definity) was administered. Image quality was adequate. LEFT VENTRICLE: Size was normal. Systolic function was normal. Ejection  fraction was estimated in the range of 65 % to 70 %. There were no regional  wall motion abnormalities. There was mild concentric hypertrophy. Left  ventricular diastolic function parameters were normal. Avg E/e': 11.05.    RIGHT VENTRICLE: The size was normal. Systolic function was normal. The  tricuspid jet envelope definition was inadequate for estimation of RV   systolic  pressure.     LEFT ATRIUM: Size was normal.    ATRIAL SEPTUM: Agitated saline contrast injection (bubble study) was   performed. There was a right-to-left shunt, in the baseline state. RIGHT ATRIUM: Size was normal.    SYSTEMIC VEINS: IVC: The inferior vena cava was normal in size and course. The  respirophasic change in diameter was more than 50%. AORTIC VALVE: The valve was trileaflet. Leaflets exhibited mild sclerosis. There was no evidence for stenosis. There was no insufficiency. MITRAL VALVE: Valve structure was normal. There was no evidence for stenosis. There was no regurgitation. TRICUSPID VALVE: The valve structure was normal. There was no evidence for  stenosis. There was trivial regurgitation. PULMONIC VALVE: Not well visualized. There was no evidence for stenosis. There  was no insufficiency. PERICARDIUM: There was no pericardial effusion. AORTA: The root exhibited normal size. SUMMARY:    -  Left ventricle: Systolic function was normal. Ejection fraction was  estimated in the range of 65 % to 70 %. There were no regional wall motion  abnormalities. There was mild concentric hypertrophy. -  Atrial septum: Agitated saline contrast injection (bubble study) was  performed. There was a right-to-left shunt, in the baseline state. -  Inferior vena cava, hepatic veins: The respirophasic change in diameter   was  more than 50%. SYSTEM MEASUREMENT TABLES    2D mode  AoR Diam (2D): 2.8 cm  LA Dimension (2D): 3.5 cm  Left Atrium Systolic Volume Index; Method of Disks, Biplane; 2D mode;: 26   ml/m2  IVS/LVPW (2D): 1  IVSd (2D): 1.2 cm  LVIDd (2D): 5.2 cm  LVIDs (2D): 2.9 cm  LVOT Area (2D): 2.3 cm2  LVPWd (2D): 1.2 cm  RVIDd (2D): 2.2 cm    Tissue Doppler Imaging  LV Peak Early Thomas Tissue Rc; Lateral MA (TDI): 8.1 cm/s  LV Peak Early Thomas Tissue Rc; Medial MA (TDI): 7.6 cm/s    Unspecified Scan Mode  Peak Grad; Mean; Antegrade Flow: 14 mm[Hg]  Vmax;  Antegrade Flow: 185 cm/s  LVOT Diam: 1.7 cm  MV Peak Rc/LV Peak Tissue Rc E-Wave; Lateral MA: 10.7  MV Peak Rc/LV Peak Tissue Rc E-Wave; Medial MA: 11.4    Prepared and signed by    Jl Fuentes. Lillian Cuellar MD [facility]  Signed 12-Dec-2019 15:46:58             Assessment and Plan:   Principal Problem:     Acute embolic stroke  Continues to have left sided neglect  -MRI head: with acute infarcts in L cerebellar hemisphere, deep frontoparietal white matter, and R paramedian yariel. Also noted old lacunar infarcts. -CTA head: No large vessel occlusion   -ISMAEL: 3 mm PFO    Plan:  -Cont statin, ASA  -PT/OT/ST  -Needs outpatient cardiology follow-up for event monitor and PFO closure. Active Problems:    Hypertension  BP well controlled at this time  Episodes of bradycardia noted    Plan:  -Continue metoprolol; will adjust dose if repeat episodes of bradycardia  -Continue amlodipine      Type 2 diabetes mellitus - cont on Lantus and SSI      Arrhythmia - had brief wide complex tachycardia early in admission, will need event monitor as outpt per cards      Hyperlipemia - statin therapy      PFO (patent foramen ovale) - will need closure as outpatient per cardiology      Ppx: Lovenox for VTE    Code Status: FULL CODE    Disposition: patient has been medically clear for discharge but is without insurance, needs rehab/placement. Awaiting Medicaid approval for inpatient rehab. Per previous notes, does have plans for first floor apartment once able to discharge to home. Cm following.      Signed:  Shamar Colin MD

## 2020-01-02 LAB
GLUCOSE BLD STRIP.AUTO-MCNC: 101 MG/DL (ref 65–100)
GLUCOSE BLD STRIP.AUTO-MCNC: 103 MG/DL (ref 65–100)
GLUCOSE BLD STRIP.AUTO-MCNC: 94 MG/DL (ref 65–100)
GLUCOSE BLD STRIP.AUTO-MCNC: 95 MG/DL (ref 65–100)

## 2020-01-02 PROCEDURE — 74011250637 HC RX REV CODE- 250/637: Performed by: PHYSICIAN ASSISTANT

## 2020-01-02 PROCEDURE — 74011250637 HC RX REV CODE- 250/637: Performed by: INTERNAL MEDICINE

## 2020-01-02 PROCEDURE — 65660000000 HC RM CCU STEPDOWN

## 2020-01-02 PROCEDURE — 74011250636 HC RX REV CODE- 250/636: Performed by: INTERNAL MEDICINE

## 2020-01-02 PROCEDURE — 65270000029 HC RM PRIVATE

## 2020-01-02 PROCEDURE — 92507 TX SP LANG VOICE COMM INDIV: CPT

## 2020-01-02 PROCEDURE — 74011636637 HC RX REV CODE- 636/637: Performed by: INTERNAL MEDICINE

## 2020-01-02 PROCEDURE — 74011250637 HC RX REV CODE- 250/637: Performed by: HOSPITALIST

## 2020-01-02 PROCEDURE — 82962 GLUCOSE BLOOD TEST: CPT

## 2020-01-02 PROCEDURE — 97530 THERAPEUTIC ACTIVITIES: CPT

## 2020-01-02 RX ADMIN — METOPROLOL TARTRATE 50 MG: 50 TABLET, FILM COATED ORAL at 16:31

## 2020-01-02 RX ADMIN — Medication 10 ML: at 22:08

## 2020-01-02 RX ADMIN — ACETAMINOPHEN 650 MG: 325 TABLET, FILM COATED ORAL at 16:27

## 2020-01-02 RX ADMIN — ASPIRIN 81 MG 81 MG: 81 TABLET ORAL at 08:53

## 2020-01-02 RX ADMIN — LISINOPRIL 40 MG: 20 TABLET ORAL at 08:52

## 2020-01-02 RX ADMIN — PANTOPRAZOLE SODIUM 40 MG: 40 TABLET, DELAYED RELEASE ORAL at 06:21

## 2020-01-02 RX ADMIN — INSULIN GLARGINE 10 UNITS: 100 INJECTION, SOLUTION SUBCUTANEOUS at 22:08

## 2020-01-02 RX ADMIN — GUAIFENESIN 100 MG: 100 SOLUTION ORAL at 16:28

## 2020-01-02 RX ADMIN — METOPROLOL TARTRATE 50 MG: 50 TABLET, FILM COATED ORAL at 08:53

## 2020-01-02 RX ADMIN — Medication 10 ML: at 14:42

## 2020-01-02 RX ADMIN — ACETAMINOPHEN 650 MG: 325 TABLET, FILM COATED ORAL at 08:57

## 2020-01-02 RX ADMIN — AMLODIPINE BESYLATE 10 MG: 10 TABLET ORAL at 08:53

## 2020-01-02 RX ADMIN — Medication 10 ML: at 06:21

## 2020-01-02 RX ADMIN — ATORVASTATIN CALCIUM 80 MG: 80 TABLET, FILM COATED ORAL at 22:08

## 2020-01-02 RX ADMIN — ENOXAPARIN SODIUM 40 MG: 40 INJECTION SUBCUTANEOUS at 14:42

## 2020-01-02 NOTE — PROGRESS NOTES
Neurolinguistic Goals:  LTG: Patient will increase neuro-linguistic abilities to increase safety and awareness in functional living environment   STG: Patient will participate in speech/language/cognitive evaluation.  MET 12/17/19  STG: Patient will solve mathematical problems with 80%accuracy given minimal cueing   STG: Patient will name 10 items to concrete category in 1 minute given minimal cueing  STG: Patient will participate in verbal reasoning tasks with 80% accuracy given minimal cueing  STG: Patient will complete mental manipulation tasks with 80% accuracy given minimal cueing  STG: Patient will complete working memory/attention tasks with 80% accuracy given minimal cueing  STG: Patient will recall relevant verbal information with 80% accuracy with minimal assistance  STG: Patient will utilize memory compensatory strategies to improve recall of functional list/message with 90%accuracy given minimal assistance  STG: Patient will participate in ongoing cognitive linguistic evaluation for therapeutic assessment      Dysarthria Goals:  LTG: Patient will develop functional and intelligible speech and utilize compensatory strategies to improve communication across environments  STG: Patient will fill in carrier phrase/complete automatic speech tasks with 95% accuracy given minimal cueing  STG: Patient will repeat phrases, sentences with 85% accuracy given minimal cueing  STG: Patient will utilize compensatory strategies across tasks with 90% accuracy given minimal cueing    SPEECH LANGUAGE PATHOLOGY: SPEECH-LANGUAGE/COGNITION: Daily Note 6    NAME/AGE/GENDER: Tammy Montes is a 53 y.o. male  DATE: 1/2/2020  PRIMARY DIAGNOSIS: Acute ischemic stroke ([facility] [address] [I63.9]  Cerebrovascular accident (CVA) due to embolism ([facility] [address] [I63.9]      ICD-10: Treatment Diagnosis: R47.1 Dysarthria and Anarthria  R41.841 Cognitive-Communication Deficit    INTERDISCIPLINARY COLLABORATION: Registered Nurse  PRECAUTIONS/ALLERGIES: Patient has no known allergies. SUBJECTIVE   Wife at bedside. Pleasant. Problem List:  (Impairments causing functional limitations):  1. Cognitive linguistic deficits  2. Dysarthria     Orientation:   Person  Place  Time  Situation     Pain: Pain Scale 1: Numeric (0 - 10)  Pain Intensity 1: 0  Pain Location 1: Head  Pain Intervention(s) 1: Medication (see MAR)     OBJECTIVE   The following treatment tasks were completed:    Delayed recall- dysarthria strategies-2/3 independently; 3/3 with min cues   Inconsistencies in sentences- 3/5 independently; 4/5 min cues; 5/5 mod cues  Reasoning/problem solving-  Basic verbal problem solvin/4   Basic time reasonin/3 independently; 2/3 min cues       ASSESSMENT   Patient continues to present with moderate cognitive linguistic impairment and moderate dysarthria. Requires cueing to utilize dysarthria strategies to improve intelligibility. Delayed initiation noted across tasks. Basic verbal problem solving functional, however difficulty with basic problem solving with time reasoning component. Concerns for medication/financial management. Will need assistance. Patient will benefit from ongoing skilled intervention to improve speech intelligibility and cognitive linguistic skills. Tool Used: MODIFIED BRITT SCALE (mRS)   Score   No Symptoms  [] 0   No significant disability despite symptoms; able to carry out all usual duties and activities  [] 1   Slight disability; unable to carry out all previous activities but able to look after own affairs without assistance.    [] 2   Moderate disability; requiring some help but able to walk without assistance  [] 3   Moderately severe disability; unable to walk without assistance and unable to attend to own bodily needs without assistance  [] 4   Severe disability; bedridden, incontinent, and requiring constant nursing care and attention  [] 5      Score:  Initial: 2 Most Recent: 2  (Date 20 )   Interpretation of Tool: The Modified [place] Scale is a 7-point scaled used to quantify level of disability as it relates to a patient's functional abilities. PLAN    FREQUENCY/DURATION: Continue to follow patient 3 times a week for duration of hospital stay to address above goals. - Recommendations for next treatment session: Next treatment will address cognitive linguistic and dysarthria treatment   REHABILITATION POTENTIAL FOR STATED GOALS: Excellent           RECOMMENDATIONS   STRATEGIES:   Dysarthria strategies  Memory strategies      EDUCATION:  · Recommendations discussed with Patient and wife     RECOMMENDATIONS for CONTINUED SPEECH THERAPY: YES: Anticipate need for ongoing speech therapy during this hospitalization and at next level of care. Compliance with Program/Exercises: Will assess as treatment progresses  Continuation of Skilled Services/Medical Necessity:   Patient is expected to demonstrate progress in cognitive ability and dysarthria to decrease assistance required communication, increase independence with activities of daily living and increase communication with family/caregivers.  Patient continues to require skilled intervention due to dysarthria and cognitive linguistic deficits.      SAFETY:  After treatment position/precautions:  · Upright in bed  · Wife at bedside  · Call light within reach    Total Treatment Duration:   Time In: 1301  Time Out: [address] [facility] [address] CCC-SLP

## 2020-01-02 NOTE — PROGRESS NOTES
Hospitalist Note     Admit Date:  2019  9:07 AM   Name:  Calin Yen   Age:  53 y.o.  :  1975   MRN:  [id number]   PCP:  Keyana Dutton MD    HPI: Mr. Sherine Joshi is a 54 y/o smoker with DM, HTN, who presented to ER  with L leg and arm weakness, L facial droop, dysarthria. First noted L leg weakness late night  when he woke up to go to the bathroom. He was normal when he went to bed around 1030. EMS called. Noted to have slurred speech and L facial droop as well. Code S called in ER around 9am.  MRI with acute infarcts in L cerebellar hemisphere, deep frontoparietal white matter, and R paramedian yariel. Also noted old lacunar infarcts. No large vessel occlusion on CTA, but some stenosis noted. He was started on asa, statins. Neurology consulted. An ECHO showed PFO. Plan for cardiology referral and evaluate for closure as outpatient. PT/OT suggested inpatient rehabilitation. The patient is uninsured and the only option at this moment is IRC. Subjective:   Patient seen and examined at bedside in no acute distress. Patient with no complaints at this time. He denies any chest pain or shortness of breath. He denies any issues with bowel movements, dysuria, arthralgias or myalgias. Objective:     Patient Vitals for the past 24 hrs:   Temp Pulse Resp BP SpO2   20 1200 98.4 °F (36.9 °C) (!) 58 18 105/72 97 %   20 0800 98.1 °F (36.7 °C) 70 18 125/81 91 %   20 0400 97.8 °F (36.6 °C) 70 19 113/81 93 %   20 0000 98.1 °F (36.7 °C) 66 18 128/71 93 %   20 2000 98.4 °F (36.9 °C) 60 19 115/73 92 %   20 1600 98.9 °F (37.2 °C) 71 18 116/76 96 %     Oxygen Therapy  O2 Sat (%): 97 % (20 1200)  Pulse via Oximetry: 73 beats per minute (19 1615)  No intake or output data in the 24 hours ending 20 1512      Physical Exam:  General:    Well nourished. Alert and oriented. CV:   RRR. No murmur, rub, or gallop.     Lungs:  Clear to auscultation bilaterally. No wheezing, rhonchi, or rales  Extremities: Warm and dry. No cyanosis or edema. Neurologic: CN II-XII intact except for mild L facial droop. Sensation intact. PERRLA.  dysarthria. No                          confusion. STR 1/5 LUE and LLE. Dysarthria noted. Skin:     No rashes or jaundice. No wounds. Psych:  Appears depressed    I reviewed the labs, imaging, EKGs, telemetry, and other studies done this admission.     Data Review:   Recent Results (from the past 24 hour(s))   GLUCOSE, POC    Collection Time: 01/01/20  4:26 PM   Result Value Ref Range    Glucose (POC) 103 (H) 65 - 100 mg/dL   GLUCOSE, POC    Collection Time: 01/01/20  8:25 PM   Result Value Ref Range    Glucose (POC) 106 (H) 65 - 100 mg/dL   GLUCOSE, POC    Collection Time: 01/02/20  7:29 AM   Result Value Ref Range    Glucose (POC) 101 (H) 65 - 100 mg/dL   GLUCOSE, POC    Collection Time: 01/02/20 11:29 AM   Result Value Ref Range    Glucose (POC) 94 65 - 100 mg/dL       All Micro Results     None          Current Facility-Administered Medications   Medication Dose Route Frequency    metoprolol tartrate (LOPRESSOR) tablet 50 mg  50 mg Oral BID    polyethylene glycol (MIRALAX) packet 17 g  17 g Oral DAILY PRN    insulin glargine (LANTUS) injection 10 Units  10 Units SubCUTAneous QHS    pantoprazole (PROTONIX) tablet 40 mg  40 mg Oral ACB    guaiFENesin (ROBITUSSIN) 100 mg/5 mL oral liquid 100 mg  100 mg Oral Q4H PRN    hydrALAZINE (APRESOLINE) 20 mg/mL injection 20 mg  20 mg IntraVENous Q4H PRN    atorvastatin (LIPITOR) tablet 80 mg  80 mg Oral QHS    amLODIPine (NORVASC) tablet 10 mg  10 mg Oral DAILY    lisinopril (PRINIVIL, ZESTRIL) tablet 40 mg  40 mg Oral DAILY    sodium chloride (NS) flush 5-40 mL  5-40 mL IntraVENous Q8H    sodium chloride (NS) flush 5-40 mL  5-40 mL IntraVENous PRN    ondansetron (ZOFRAN) injection 4 mg  4 mg IntraVENous Q4H PRN    aspirin chewable tablet 81 mg  81 mg Oral DAILY    acetaminophen (TYLENOL) tablet 650 mg  650 mg Oral Q4H PRN    enoxaparin (LOVENOX) injection 40 mg  40 mg SubCUTAneous Q24H    insulin lispro (HUMALOG) injection   SubCUTAneous AC&HS    dextrose 40% (GLUTOSE) oral gel 1 Tube  15 g Oral PRN    glucagon (GLUCAGEN) injection 1 mg  1 mg IntraMUSCular PRN    dextrose (D50W) injection syrg 12.5-25 g  25-50 mL IntraVENous PRN       Other Studies:  No results found. Results for orders placed or performed during the hospital encounter of 19   2D ECHO COMPLETE ADULT (TTE) [address]  [address] [facility], [address] [facility]  [phone]    Transthoracic Echocardiogram  2D, M-mode, Doppler, and Color Doppler    Patient: Brook Frederick  MR #: [id number]  : 1975  Age: 53 years  Gender: Male  Study date: 12-Dec-2019  Account #: [de-identified]  Height: 66 in  Weight: 209.7 lb  BSA: 2.04 mï¾²  Status:Routine  Location: ER03  BP: 133/ 76    Allergies: NO KNOWN ALLERGENS    Sonographer:  LUZ Eller  Group:  [id number] Cardiology  Referring Physician:  Brenda Thompson. Amna Sauceda MD  Reading Physician:  Harpreet Brush [address], MD [facility]    INDICATIONS: CVA    PROCEDURE: This was a routine study. A transthoracic echocardiogram was  performed. The study included complete 2D imaging, M-mode, complete spectral  Doppler, and color Doppler. Intravenous contrast (Definity) was administered. Image quality was adequate. LEFT VENTRICLE: Size was normal. Systolic function was normal. Ejection  fraction was estimated in the range of 65 % to 70 %. There were no regional  wall motion abnormalities. There was mild concentric hypertrophy. Left  ventricular diastolic function parameters were normal. Avg E/e': 11.05.    RIGHT VENTRICLE: The size was normal. Systolic function was normal. The  tricuspid jet envelope definition was inadequate for estimation of RV   systolic  pressure.     LEFT ATRIUM: Size was normal.    ATRIAL SEPTUM: Agitated saline contrast injection (bubble study) was   performed. There was a right-to-left shunt, in the baseline state. RIGHT ATRIUM: Size was normal.    SYSTEMIC VEINS: IVC: The inferior vena cava was normal in size and course. The  respirophasic change in diameter was more than 50%. AORTIC VALVE: The valve was trileaflet. Leaflets exhibited mild sclerosis. There was no evidence for stenosis. There was no insufficiency. MITRAL VALVE: Valve structure was normal. There was no evidence for stenosis. There was no regurgitation. TRICUSPID VALVE: The valve structure was normal. There was no evidence for  stenosis. There was trivial regurgitation. PULMONIC VALVE: Not well visualized. There was no evidence for stenosis. There  was no insufficiency. PERICARDIUM: There was no pericardial effusion. AORTA: The root exhibited normal size. SUMMARY:    -  Left ventricle: Systolic function was normal. Ejection fraction was  estimated in the range of 65 % to 70 %. There were no regional wall motion  abnormalities. There was mild concentric hypertrophy. -  Atrial septum: Agitated saline contrast injection (bubble study) was  performed. There was a right-to-left shunt, in the baseline state. -  Inferior vena cava, hepatic veins: The respirophasic change in diameter   was  more than 50%. SYSTEM MEASUREMENT TABLES    2D mode  AoR Diam (2D): 2.8 cm  LA Dimension (2D): 3.5 cm  Left Atrium Systolic Volume Index; Method of Disks, Biplane; 2D mode;: 26   ml/m2  IVS/LVPW (2D): 1  IVSd (2D): 1.2 cm  LVIDd (2D): 5.2 cm  LVIDs (2D): 2.9 cm  LVOT Area (2D): 2.3 cm2  LVPWd (2D): 1.2 cm  RVIDd (2D): 2.2 cm    Tissue Doppler Imaging  LV Peak Early Thomas Tissue Rc; Lateral MA (TDI): 8.1 cm/s  LV Peak Early Thomas Tissue Rc; Medial MA (TDI): 7.6 cm/s    Unspecified Scan Mode  Peak Grad; Mean; Antegrade Flow: 14 mm[Hg]  Vmax;  Antegrade Flow: 185 cm/s  LVOT Diam: 1.7 cm  MV Peak Rc/LV Peak Tissue Rc E-Wave; Lateral MA: 10.7  MV Peak Rc/LV Peak Tissue Rc E-Wave; Medial MA: 11.4    Prepared and signed by    Sandra Lobo. Bradley Suresh MD [facility]  Signed 12-Dec-2019 15:46:58             Assessment and Plan:   Principal Problem:     Acute embolic stroke  Continues to have left sided neglect  Evaluated by cardiology and neurology  -MRI head: with acute infarcts in L cerebellar hemisphere, deep frontoparietal white matter, and R paramedian yariel. Also noted old lacunar infarcts. -CTA head: No large vessel occlusion   -ISMAEL: 3 mm PFO    Plan:  -Cont statin, ASA  -PT/OT/ST  -Needs outpatient cardiology follow-up for event monitor and PFO closure. Active Problems:    Hypertension  BP well controlled at this time  Episodes of bradycardia noted    Plan:  -Continue metoprolol; will adjust dose if repeat episodes of bradycardia  -Continue amlodipine    Type 2 diabetes mellitus - cont on Lantus and SSI    Arrhythmia - had brief wide complex tachycardia early in admission, will need event monitor as outpt per cards    Hyperlipemia - statin therapy    PFO (patent foramen ovale) - will need closure as outpatient per cardiology      Ppx: Lovenox for VTE    Code Status: FULL CODE    Disposition: patient has been medically clear for discharge but is without insurance, needs rehab/placement. Awaiting Medicaid approval for inpatient rehab. Per previous notes, does have plans for first floor apartment once able to discharge to home. Cm following.      Signed:  Maura Ren MD

## 2020-01-02 NOTE — INTERDISCIPLINARY ROUNDS
Interdisciplinary team rounds were held 1/2/2020 with the following team members:  Care Management, Physical Therapy, Physician and . Plan of care discussed. See clinical pathway and/or care plan for interventions and desired outcomes.

## 2020-01-02 NOTE — PROGRESS NOTES
01/02/20 8319   NIH Stroke Scale   Interval Other (comment)  (Dual NIH Lisa Cornejo RN )   LOC 0   LOC Questions 0   LOC Commands 0   Best Gaze 0   Visual 0   Facial Palsy 2   Motor Right Arm 0   Motor Left Arm 4   Motor Right Leg 0   Motor Left Leg 4   Limb Ataxia 0   Sensory 0

## 2020-01-02 NOTE — PROGRESS NOTES
01/01/20 1900   NIH Stroke Scale   Interval Other (comment)   LOC 0   LOC Questions 0   LOC Commands 0   Best Gaze 0   Visual 0   Facial Palsy 2   Motor Right Arm 0   Motor Left Arm 4   Motor Right Leg 0   Motor Left Leg 4   Limb Ataxia 0   Sensory 0   Best Language 0   Dysarthria 1   Extinction and Inattention 0   Total 11   Dual NIH with Sylvia RN

## 2020-01-02 NOTE — PROGRESS NOTES
Problem: Mobility Impaired (Adult and Pediatric)  Goal: *Acute Goals and Plan of Care  Description  Goals adjusted 12/31/19  STG:  (1.) Mr. Jackie Anderson will move from supine to sit and sit to supine , scoot up and down and roll side to side with MINIMAL ASSIST within 3 treatment day(s). (2.) Mr. Jackie Anderson will transfer from bed to chair and chair to bed with MODERATE ASSIST using the least restrictive device within 3 treatment day(s). (3.) Mr. Jackie Anderson will perform standing static and dynamic balance activities x 10 minutes with MODERATE ASSISTx1 to improve safety within 3 treatment day(s). LTG:  (1.) Mr. Jackie Anderson will move from supine to sit and sit to supine , scoot up and down and roll side to side with CONTACT GUARD ASSIST within 7 treatment day(s). (2.) Mr. Jackie Anderson will transfer from bed to chair and chair to bed with MINIMAL ASSIST using the least restrictive device within 7 treatment day(s). (3.) Mr. Jackie Anderson will perform standing static and dynamic balance activities x 20 minutes with MINIMAL ASSIST to improve safety within 7 treatment day(s). (4.) Mr. Jackie Anderson will perform bilateral lower extremity exercises x 15 min for HEP with SUPERVISION to improve strength, endurance, and functional mobility within 7 treatment day(s).      PHYSICAL THERAPY: Daily Note and PM 1/2/2020  INPATIENT: PT Visit Days : 2  Payor: MEDICAID PENDING / Plan: Garrick Yoon PENDING / Product Type: Medicaid /       NAME/AGE/GENDER: Priti Kelly is a 53 y.o. male   PRIMARY DIAGNOSIS: Acute ischemic stroke ([address] [I63.9]  Cerebrovascular accident (CVA) due to embolism ([address] [C86.5] Acute embolic stroke ([address] Acute embolic stroke ([address]       ICD-10: Treatment Diagnosis:   · Other lack of cordination (R27.8)  · Difficulty in walking, Not elsewhere classified (R26.2)  · Other abnormalities of gait and mobility (R26.89)  · Unspecified Lack of Coordination (R27.9)  · Hemiplegia and hemiparesis following cerebral infarction affecting   · left non-dominant side (F38.260)   Precaution/Allergies:  Patient has no known allergies. ASSESSMENT:     Mr. Clovis Jordan is a 53year old M who presents to hospital with L sided facial droop and LUE and LLE weakness. MRI indicated acute infarcts in L cerebellar hemisphere, deep frontoparietal white matter and R paramedian yariel. Prior to hospital admission pt lives with his wife in a one story apartment on the second level with 12 steps to access. Prior to admission Mr. Clovis Jordan uses no DME for mobility. Patient presents today in supine agreeable to have therapy. His speech remains impaired but he attempts to communicate basic simple words. His LLE required facilitory type activities for AAROM movements in supine with only slight movement noted at the hip and no active knee or DF noted. RLE AROM is WFLs. LUE also requires support and position secondary to flaccidity at this time. He required moderate assist for supine to sit with fair sitting balance on the EOB after positioned with his feet on the floor. He tends to move his right side frequently when sitting which compromises his sitting balance. He used his RUE to support himself and he practiced pushing up/down from his right side and maintaining sitting balance with supervision. His tendency is to fall posterior and to the left. He practiced scooting with RUE/RLE use with min/mod assist. He returned to supine with max assist and was positioned for comfort with the call light in reach. He was kind and cooperative with therapy. He requires assist x 2 for transfers and any sit to stand activities secondary to his dense left hemiparesis. * when patient in recliner, recline legs. He fell out of the chair trying to pick something up on the floor*    This section established at most recent assessment   PROBLEM LIST (Impairments causing functional limitations):  1. Decreased Strength  2. Decreased ADL/Functional Activities  3.  Decreased Transfer Abilities  4. Decreased Ambulation Ability/Technique  5. Decreased Balance  6. Increased Pain  7. Decreased Activity Tolerance  8. Increased Fatigue  9. Decreased Flexibility/Joint Mobility   INTERVENTIONS PLANNED: (Benefits and precautions of physical therapy have been discussed with the patient.)  1. Balance Exercise  2. Bed Mobility  3. Family Education  4. Gait Training  5. Home Exercise Program (HEP)  6. Manual Therapy  7. Neuromuscular Re-education/Strengthening  8. Range of Motion (ROM)  9. Therapeutic Activites  10. Therapeutic Exercise/Strengthening  11. Transfer Training     TREATMENT PLAN: Frequency/Duration: 3 times a week for duration of hospital stay  Rehabilitation Potential For Stated Goals: [facility] (at time of discharge pending progress):    Placement: It is my opinion, based on this patient's performance to date, that Mr. Latrell Collins may benefit from intensive therapy at an [address] [place] [address] after discharge due to a probable need for 24 hour rehab nursing, a probable need for multiple therapy disciplines, and potential to make ongoing and sustainable functional improvement that is of practical value. .  Equipment:    TBD pending progress with therapy. HISTORY:   History of Present Injury/Illness (Reason for Referral):  Per H&P: \"Pt is a 54 y/o smoker with DM, HTN, who presented to ER with L leg and arm weakness, L facial droop, dysarthria. First noted L leg weakness late night 12/11 when he woke up to go to the bathroom. He was normal when he went to bed around 1030. Woke up this morning and had persistent weakness L leg and also now noted in L arm. EMS called. Noted to have slurred speech and L facial droop as well. Code S called in ER around 9am.  MRI with acute infarcts in L cerebellar hemisphere, deep frontoparietal white matter, and R paramedian yariel. Also noted old lacunar infarcts.   No large vessel occlusion on CTA, but some stenosis noted. No hx afib, TIA, CVA. No CP, palpitations, SOB. \"  Past Medical History/Comorbidities:   Mr. Elvin Farooq  has a past medical history of Acute ischemic stroke ([address] (12/12/2019), Acute pancreatitis (11/19/2014), Cerebrovascular accident (CVA) due to embolism ([address] (12/12/2019), Diabetes ([address] (2002), Diabetes ([address] Diabetes mellitus, and Hypertension. He also has no past medical history of Arthritis, Asthma, Autoimmune disease ([address] CAD (coronary artery disease), Cancer ([address] Chronic kidney disease, COPD, Dementia, Dementia ([address] Heart failure ([address] Ill-defined condition, Infectious disease, Liver disease, Other ill-defined conditions(799.89), Psychiatric disorder, PUD (peptic ulcer disease), Seizures ([address] or Sleep disorder. Mr. Elvin Farooq  has a past surgical history that includes hx hernia repair and hx orthopaedic. Social History/Living Environment:   Home Environment: Apartment  # Steps to Enter: 12  Rails to Enter: Yes  [employer] : Bilateral  One/Two Story Residence: One story  Living Alone: No  Support Systems: Spouse/Significant Other/Partner  Patient Expects to be Discharged to[de-identified] Unknown  Current DME Used/Available at Home: None  Tub or Shower Type: Tub/Shower combination  Prior Level of Function/Work/Activity:  Independent, lives with wife in 2nd story 1 level apartment. No recent falls. Number of Personal Factors/Comorbidities that affect the Plan of Care: 1-2: MODERATE COMPLEXITY   EXAMINATION:   Most Recent Physical Functioning:   Gross Assessment:                  Posture:     Balance:  Sitting: Impaired  Sitting - Static: Fair (occasional)  Sitting - Dynamic: Poor (constant support) Bed Mobility:  Rolling:  Moderate assistance  Supine to Sit: Moderate assistance  Sit to Supine: Maximum assistance  Scooting: Maximum assistance  Interventions: Safety awareness training  Wheelchair Mobility:     Transfers:     Gait:            Body Structures Involved:  1. Nerves  2. Voice/Speech  3. Bones  4. Joints  5. Muscles Body Functions Affected:  1. Mental  2. Sensory/Pain  3. Neuromusculoskeletal  4. Movement Related Activities and Participation Affected:  1. General Tasks and Demands  2. Mobility  3. Self Care  4. Interpersonal Interactions and Relationships   Number of elements that affect the Plan of Care: 4+: HIGH COMPLEXITY   CLINICAL PRESENTATION:   Presentation: Evolving clinical presentation with changing clinical characteristics: MODERATE COMPLEXITY   CLINICAL DECISION MAKIN [facility] Inpatient Short Form  How much difficulty does the patient currently have. .. Unable A Lot A Little None   1. Turning over in bed (including adjusting bedclothes, sheets and blankets)? [] 1   [] 2   [x] 3   [] 4   2. Sitting down on and standing up from a chair with arms ( e.g., wheelchair, bedside commode, etc.)   [] 1   [x] 2   [] 3   [] 4   3. Moving from lying on back to sitting on the side of the bed? [] 1   [x] 2   [] 3   [] 4   How much help from another person does the patient currently need. .. Total A Lot A Little None   4. Moving to and from a bed to a chair (including a wheelchair)? [] 1   [x] 2   [] 3   [] 4   5. Need to walk in hospital room? [] 1   [x] 2   [] 3   [] 4   6. Climbing 3-5 steps with a railing? [] 1   [x] 2   [] 3   [] 4   © , [employer] of [address] [place] [address], under license to [employer]. All rights reserved      Score:  Initial: 13 Most Recent: 13 (Date:19 )    Interpretation of Tool:  Represents activities that are increasingly more difficult (i.e. Bed mobility, Transfers, Gait). Medical Necessity:     · Patient is expected to demonstrate progress in   · strength, range of motion, balance, coordination, and functional technique  ·  to   · increase independence with all mobility.    · .  Reason for Services/Other Comments:  · Patient continues to require skilled intervention due to   · medical complications and mobility deficits which impact his level of function, safety, and independence as indicated above. · .   Use of outcome tool(s) and clinical judgement create a POC that gives a: Questionable prediction of patient's progress: MODERATE COMPLEXITY        TREATMENT:   (In addition to Assessment/Re-Assessment sessions the following treatments were rendered)   Pre-treatment Symptoms/Complaints:  \"I am good\"  Pain: Initial:   Pain Intensity 1: 0  Post Session:  No pain reported at end of session, resting comfortably in bed. REASSESSMENT    Therapeutic Activity: (    25min):  Therapeutic activities including bed mobility, rolling, scooting, sit <> stand transfer training  And stand pivot to the bed to improve mobility, strength, balance, and coordination. Required maximal cues with moderate physical assist   to promote coordination of bilateral, upper extremity(s), lower extremity(s) and promote motor control of bilateral, upper extremity(s), lower extremity(s). Date:  12/24/19 Date:   Date:     Activity/Exercise Parameters Parameters Parameters   Supine heel slides 10x B   PROM L Facilitory AAROM on LLE    Supine SLR 10x R     Supine bridging 10x holding L knee bent     Supine LTR 10x ea direction      Supine hip abd/adduction  Facilitory AAROM on LLE    Supine hip flex/ext  Facilitory AAROM on LLE                Braces/Orthotics/Lines/Etc:   · O2 Device: Room Air   Treatment/Session Assessment:    · Response to Treatment:  see above. Works very hard with therapy. · Interdisciplinary Collaboration:   o Physical Therapist  o Registered Nurse  · After treatment position/precautions:   o Supine in bed  o Bed alarm/tab alert on  o Bed/Chair-wheels locked  o Bed in low position  o Call light within reach  o RN notified   · Compliance with Program/Exercises: Compliant all of the time  · Recommendations/Intent for next treatment session:   \"Next visit will focus on advancements to more challenging activities and reduction in assistance provided\".     Total Treatment Duration:  PT Patient Time In/Time Out  Time In: 1057  Time Out: [address] [place]

## 2020-01-03 LAB
ANION GAP SERPL CALC-SCNC: 6 MMOL/L (ref 7–16)
BASOPHILS # BLD: 0 K/UL (ref 0–0.2)
BASOPHILS NFR BLD: 1 % (ref 0–2)
BUN SERPL-MCNC: 18 MG/DL (ref 6–23)
CALCIUM SERPL-MCNC: 10.3 MG/DL (ref 8.3–10.4)
CHLORIDE SERPL-SCNC: 111 MMOL/L (ref 98–107)
CO2 SERPL-SCNC: 26 MMOL/L (ref 21–32)
CREAT SERPL-MCNC: 1.25 MG/DL (ref 0.8–1.5)
DIFFERENTIAL METHOD BLD: ABNORMAL
EOSINOPHIL # BLD: 0.1 K/UL (ref 0–0.8)
EOSINOPHIL NFR BLD: 2 % (ref 0.5–7.8)
ERYTHROCYTE [DISTWIDTH] IN BLOOD BY AUTOMATED COUNT: 17 % (ref 11.9–14.6)
GLUCOSE BLD STRIP.AUTO-MCNC: 116 MG/DL (ref 65–100)
GLUCOSE BLD STRIP.AUTO-MCNC: 136 MG/DL (ref 65–100)
GLUCOSE BLD STRIP.AUTO-MCNC: 194 MG/DL (ref 65–100)
GLUCOSE BLD STRIP.AUTO-MCNC: 93 MG/DL (ref 65–100)
GLUCOSE SERPL-MCNC: 98 MG/DL (ref 65–100)
HCT VFR BLD AUTO: 38.4 % (ref 41.1–50.3)
HGB BLD-MCNC: 11.9 G/DL (ref 13.6–17.2)
IMM GRANULOCYTES # BLD AUTO: 0 K/UL (ref 0–0.5)
IMM GRANULOCYTES NFR BLD AUTO: 0 % (ref 0–5)
LYMPHOCYTES # BLD: 1.6 K/UL (ref 0.5–4.6)
LYMPHOCYTES NFR BLD: 31 % (ref 13–44)
MCH RBC QN AUTO: 25.2 PG (ref 26.1–32.9)
MCHC RBC AUTO-ENTMCNC: 31 G/DL (ref 31.4–35)
MCV RBC AUTO: 81.2 FL (ref 79.6–97.8)
MONOCYTES # BLD: 0.3 K/UL (ref 0.1–1.3)
MONOCYTES NFR BLD: 6 % (ref 4–12)
NEUTS SEG # BLD: 3.1 K/UL (ref 1.7–8.2)
NEUTS SEG NFR BLD: 60 % (ref 43–78)
NRBC # BLD: 0 K/UL (ref 0–0.2)
PLATELET # BLD AUTO: 340 K/UL (ref 150–450)
PMV BLD AUTO: 10.9 FL (ref 9.4–12.3)
POTASSIUM SERPL-SCNC: 3.9 MMOL/L (ref 3.5–5.1)
RBC # BLD AUTO: 4.73 M/UL (ref 4.23–5.6)
SODIUM SERPL-SCNC: 143 MMOL/L (ref 136–145)
WBC # BLD AUTO: 5.1 K/UL (ref 4.3–11.1)

## 2020-01-03 PROCEDURE — 97530 THERAPEUTIC ACTIVITIES: CPT

## 2020-01-03 PROCEDURE — 97112 NEUROMUSCULAR REEDUCATION: CPT

## 2020-01-03 PROCEDURE — 77030019905 HC CATH URETH INTMIT MDII -A

## 2020-01-03 PROCEDURE — 80048 BASIC METABOLIC PNL TOTAL CA: CPT

## 2020-01-03 PROCEDURE — 65270000029 HC RM PRIVATE

## 2020-01-03 PROCEDURE — 85025 COMPLETE CBC W/AUTO DIFF WBC: CPT

## 2020-01-03 PROCEDURE — 74011250637 HC RX REV CODE- 250/637: Performed by: INTERNAL MEDICINE

## 2020-01-03 PROCEDURE — 65660000000 HC RM CCU STEPDOWN

## 2020-01-03 PROCEDURE — 74011250637 HC RX REV CODE- 250/637: Performed by: HOSPITALIST

## 2020-01-03 PROCEDURE — 74011250637 HC RX REV CODE- 250/637: Performed by: PHYSICIAN ASSISTANT

## 2020-01-03 PROCEDURE — 74011636637 HC RX REV CODE- 636/637: Performed by: INTERNAL MEDICINE

## 2020-01-03 PROCEDURE — 36415 COLL VENOUS BLD VENIPUNCTURE: CPT

## 2020-01-03 PROCEDURE — 82962 GLUCOSE BLOOD TEST: CPT

## 2020-01-03 PROCEDURE — 51798 US URINE CAPACITY MEASURE: CPT

## 2020-01-03 PROCEDURE — 92507 TX SP LANG VOICE COMM INDIV: CPT

## 2020-01-03 PROCEDURE — 74011250636 HC RX REV CODE- 250/636: Performed by: INTERNAL MEDICINE

## 2020-01-03 RX ADMIN — ACETAMINOPHEN 650 MG: 325 TABLET, FILM COATED ORAL at 16:55

## 2020-01-03 RX ADMIN — LISINOPRIL 40 MG: 20 TABLET ORAL at 09:40

## 2020-01-03 RX ADMIN — ASPIRIN 81 MG 81 MG: 81 TABLET ORAL at 09:41

## 2020-01-03 RX ADMIN — GUAIFENESIN 100 MG: 100 SOLUTION ORAL at 16:55

## 2020-01-03 RX ADMIN — AMLODIPINE BESYLATE 10 MG: 10 TABLET ORAL at 09:40

## 2020-01-03 RX ADMIN — INSULIN LISPRO 2 UNITS: 100 INJECTION, SOLUTION INTRAVENOUS; SUBCUTANEOUS at 16:55

## 2020-01-03 RX ADMIN — ATORVASTATIN CALCIUM 80 MG: 80 TABLET, FILM COATED ORAL at 21:46

## 2020-01-03 RX ADMIN — ENOXAPARIN SODIUM 40 MG: 40 INJECTION SUBCUTANEOUS at 14:59

## 2020-01-03 RX ADMIN — METOPROLOL TARTRATE 50 MG: 50 TABLET, FILM COATED ORAL at 17:08

## 2020-01-03 RX ADMIN — Medication 10 ML: at 13:09

## 2020-01-03 RX ADMIN — Medication 10 ML: at 06:12

## 2020-01-03 RX ADMIN — INSULIN GLARGINE 10 UNITS: 100 INJECTION, SOLUTION SUBCUTANEOUS at 21:47

## 2020-01-03 RX ADMIN — PANTOPRAZOLE SODIUM 40 MG: 40 TABLET, DELAYED RELEASE ORAL at 06:12

## 2020-01-03 NOTE — PROGRESS NOTES
Problem: Mobility Impaired (Adult and Pediatric)  Goal: *Acute Goals and Plan of Care  Description  Goals adjusted 12/31/19  STG:  (1.) Mr. Jackie Anderson will move from supine to sit and sit to supine , scoot up and down and roll side to side with MINIMAL ASSIST within 3 treatment day(s). (2.) Mr. Jackie Anderson will transfer from bed to chair and chair to bed with MODERATE ASSIST using the least restrictive device within 3 treatment day(s). (3.) Mr. Jackie Anderson will perform standing static and dynamic balance activities x 10 minutes with MODERATE ASSISTx1 to improve safety within 3 treatment day(s). LTG:  (1.) Mr. Jackie Anderson will move from supine to sit and sit to supine , scoot up and down and roll side to side with CONTACT GUARD ASSIST within 7 treatment day(s). (2.) Mr. Jackie Anderson will transfer from bed to chair and chair to bed with MINIMAL ASSIST using the least restrictive device within 7 treatment day(s). (3.) Mr. Jackie Anderson will perform standing static and dynamic balance activities x 20 minutes with MINIMAL ASSIST to improve safety within 7 treatment day(s). (4.) Mr. Jackie Anderson will perform bilateral lower extremity exercises x 15 min for HEP with SUPERVISION to improve strength, endurance, and functional mobility within 7 treatment day(s).      PHYSICAL THERAPY: Daily Note, AM and PM 1/3/2020  INPATIENT: PT Visit Days : 3  Payor: MEDICAID PENDING / Plan: Garrick Yoon PENDING / Product Type: Medicaid /       NAME/AGE/GENDER: Priti Kelly is a 53 y.o. male   PRIMARY DIAGNOSIS: Acute ischemic stroke ([address] [I63.9]  Cerebrovascular accident (CVA) due to embolism ([address] [G39.4] Acute embolic stroke ([address] Acute embolic stroke ([address]       ICD-10: Treatment Diagnosis:   · Other lack of cordination (R27.8)  · Difficulty in walking, Not elsewhere classified (R26.2)  · Other abnormalities of gait and mobility (R26.89)  · Unspecified Lack of Coordination (R27.9)  · Hemiplegia and hemiparesis following cerebral infarction affecting   · left non-dominant side (W50.828)   Precaution/Allergies:  Patient has no known allergies. ASSESSMENT:     Mr. Clovis Jordan is a 53year old admitted with acute CVA with left hemiparesis and left inattention. Patient seen this AM for PT treatment session. Addressed bed mobility with active assistive facilitation of contralateral leg, supine to sitting transfer with trunk cues, and scooting with weight shifting and trunk cues with moderate assistance. Addressed postural retraining, reaching outside of base of support, and facilitation to scapula while OT address dynamic UE mobility and proprioception activity. Fair static and fair- dynamic sitting balance. Cues to attend to objects in left visual field and dural tasking. Midline orientation and left sided attention appears improved this treatment. Addressed sit to stand transfers with moderate assistance x2 and facilitation to L LE while OT facilitated trunk/posture and strong R UE/LE cueing to assist. Moderate assistance x2 for transfer to chair. Great treatment session today with patient demonstrating improvement. Remains two person assistance for transfer and left hemiparetic L  UE/LE. Good rehab potential demonstrated and contralateral response improving. Goals remain appropriate and progressing. Continue to recommend inpatient rehab placement; difficult d/c secondary to self pay status. Will continue to follow with stated plan of care. ADDENDUM: Assisted with transfer chair to bed with L LE facilitation and two person assistance with moderate assistance x2. Educated patient on contralateral UE (at elbow) LE support (scooping L LE with R LE) to assist with sitting to supine with moderate assistance. Improved sit to stand noted this PM with moderate assistance and errect trunk. This section established at most recent assessment   PROBLEM LIST (Impairments causing functional limitations):  1. Decreased Strength  2.  Decreased ADL/Functional Activities  3. Decreased Transfer Abilities  4. Decreased Ambulation Ability/Technique  5. Decreased Balance  6. Increased Pain  7. Decreased Activity Tolerance  8. Increased Fatigue  9. Decreased Flexibility/Joint Mobility   INTERVENTIONS PLANNED: (Benefits and precautions of physical therapy have been discussed with the patient.)  1. Balance Exercise  2. Bed Mobility  3. Family Education  4. Gait Training  5. Home Exercise Program (HEP)  6. Manual Therapy  7. Neuromuscular Re-education/Strengthening  8. Range of Motion (ROM)  9. Therapeutic Activites  10. Therapeutic Exercise/Strengthening  11. Transfer Training     TREATMENT PLAN: Frequency/Duration: 3 times a week for duration of hospital stay  Rehabilitation Potential For Stated Goals: Good     REHAB RECOMMENDATIONS (at time of discharge pending progress):    Placement: It is my opinion, based on this patient's performance to date, that Mr. Kathleen Jensen may benefit from intensive therapy at an [address] [place] [address] after discharge due to a probable need for 24 hour rehab nursing, a probable need for multiple therapy disciplines, and potential to make ongoing and sustainable functional improvement that is of practical value. .  Equipment:    TBD pending progress with therapy. HISTORY:   History of Present Injury/Illness (Reason for Referral):  Per H&P: \"Pt is a 54 y/o smoker with DM, HTN, who presented to ER with L leg and arm weakness, L facial droop, dysarthria. First noted L leg weakness late night 12/11 when he woke up to go to the bathroom. He was normal when he went to bed around 1030. Woke up this morning and had persistent weakness L leg and also now noted in L arm. EMS called. Noted to have slurred speech and L facial droop as well. Code S called in ER around 9am.  MRI with acute infarcts in L cerebellar hemisphere, deep frontoparietal white matter, and R paramedian yariel. Also noted old lacunar infarcts.   No large vessel occlusion on CTA, but some stenosis noted. No hx afib, TIA, CVA. No CP, palpitations, SOB. \"  Past Medical History/Comorbidities:   Mr. Sherine Joshi  has a past medical history of Acute ischemic stroke ([address] (12/12/2019), Acute pancreatitis (11/19/2014), Cerebrovascular accident (CVA) due to embolism ([address] (12/12/2019), Diabetes ([address] (2002), Diabetes ([address] Diabetes mellitus, and Hypertension. He also has no past medical history of Arthritis, Asthma, Autoimmune disease ([address] CAD (coronary artery disease), Cancer ([address] Chronic kidney disease, COPD, Dementia, Dementia ([address] Heart failure ([address] Ill-defined condition, Infectious disease, Liver disease, Other ill-defined conditions(799.89), Psychiatric disorder, PUD (peptic ulcer disease), Seizures ([address] or Sleep disorder. Mr. Sherine Joshi  has a past surgical history that includes hx hernia repair and hx orthopaedic. Social History/Living Environment:   Home Environment: Apartment  # Steps to Enter: 12  Rails to Enter: Yes  [employer] : Bilateral  One/Two Story Residence: One story  Living Alone: No  Support Systems: Spouse/Significant Other/Partner  Patient Expects to be Discharged to[de-identified] Unknown  Current DME Used/Available at Home: None  Tub or Shower Type: Tub/Shower combination  Prior Level of Function/Work/Activity:  Independent, lives with wife in 2nd story 1 level apartment. No recent falls. Number of Personal Factors/Comorbidities that affect the Plan of Care: 1-2: MODERATE COMPLEXITY   EXAMINATION:   Most Recent Physical Functioning:   Gross Assessment:                  Posture:     Balance:  Sitting: Impaired  Sitting - Static: Fair (occasional)  Sitting - Dynamic: Fair (occasional)(-)  Standing: Impaired  Standing - Static: Poor  Standing - Dynamic : Poor Bed Mobility:  Supine to Sit: Moderate assistance  Scooting: Moderate assistance  Wheelchair Mobility:     Transfers:  Sit to Stand:  Moderate assistance  Stand to Sit: Moderate assistance  Stand Pivot Transfers: Moderate assistance;Assist x2  Interventions: Safety awareness training; Tactile cues; Verbal cues; Visual cues  Gait:            Body Structures Involved:  1. Nerves  2. Voice/Speech  3. Bones  4. Joints  5. Muscles Body Functions Affected:  1. Mental  2. Sensory/Pain  3. Neuromusculoskeletal  4. Movement Related Activities and Participation Affected:  1. General Tasks and Demands  2. Mobility  3. Self Care  4. Interpersonal Interactions and Relationships   Number of elements that affect the Plan of Care: 4+: HIGH COMPLEXITY   CLINICAL PRESENTATION:   Presentation: Evolving clinical presentation with changing clinical characteristics: MODERATE COMPLEXITY   CLINICAL DECISION MAKIN [facility] Mobility Inpatient Short Form  How much difficulty does the patient currently have. .. Unable A Lot A Little None   1. Turning over in bed (including adjusting bedclothes, sheets and blankets)? [] 1   [] 2   [x] 3   [] 4   2. Sitting down on and standing up from a chair with arms ( e.g., wheelchair, bedside commode, etc.)   [] 1   [x] 2   [] 3   [] 4   3. Moving from lying on back to sitting on the side of the bed? [] 1   [x] 2   [] 3   [] 4   How much help from another person does the patient currently need. .. Total A Lot A Little None   4. Moving to and from a bed to a chair (including a wheelchair)? [] 1   [x] 2   [] 3   [] 4   5. Need to walk in hospital room? [] 1   [x] 2   [] 3   [] 4   6. Climbing 3-5 steps with a railing? [] 1   [x] 2   [] 3   [] 4   © , Trustees of [address] [place] [address] [address] [id number], under license to [employer]. All rights reserved      Score:  Initial: 13 Most Recent: 13 (Date:19 )    Interpretation of Tool:  Represents activities that are increasingly more difficult (i.e. Bed mobility, Transfers, Gait).     Medical Necessity:     · Patient is expected to demonstrate progress in   · strength, range of motion, balance, coordination, and functional technique  ·  to   · increase independence with all mobility. · .  Reason for Services/Other Comments:  · Patient continues to require skilled intervention due to   · medical complications and mobility deficits which impact his level of function, safety, and independence as indicated above. · .   Use of outcome tool(s) and clinical judgement create a POC that gives a: Questionable prediction of patient's progress: MODERATE COMPLEXITY        TREATMENT:   (In addition to Assessment/Re-Assessment sessions the following treatments were rendered)   Pre-treatment Symptoms/Complaints:  \"I am good\"  Pain: Initial:      Post Session:  No pain reported at end of session, resting comfortably in bed. REASSESSMENT    Therapeutic Activity: (    10min):  Therapeutic activities including bed mobility, rolling, scooting, sit <> stand transfer training  And stand pivot to the bed to improve mobility, strength, balance, and coordination. Required maximal cues with moderate physical assist   to promote coordination of bilateral, upper extremity(s), lower extremity(s) and promote motor control of bilateral, upper extremity(s), lower extremity(s). Facilitation to L LE while OT facilitated trunk and R LE. Neuromuscular Re-education ( 15 Minutes):  Exercise/activities including: Addressed postural retraining, reaching outside of base of support, and facilitation to scapula while OT address dynamic UE mobility and proprioception activity. Fair static and fair- dynamic sitting balance. Cues to attend to objects in left visual field and dural tasking to improve balance, coordination and posture. Required minimal visual, verbal, manual and tactile cues to promote dynamic balance in sitting.     At this time, patient is appropriate for Co-treatment with occupational therapy due to patient's decreased overall endurance/tolerance levels, as well as need for high level skilled assistance to complete functional transfers/mobility and functional tasks. Kath Huizar is appropriate for a multidisciplinary co-treatment of PT and OT to address goals of both disciplines. PM: Therapeutic Activity (   8 Minutes): Therapeutic activities including: scooting, sit to stand transfer, and  transfer chair to bed with L LE facilitation and two person assistance with moderate assistance x2. Educated patient on contralateral UE (at elbow) LE support (scooping L LE with R LE) to assist with sitting to supine with moderate assistance. to improve mobility, strength and balance. Required moderate visual, verbal, manual and tactile cues to promote static and dynamic balance in standing and promote coordination of left, upper extremity(s), lower extremity(s). (SEPARATE AND DISTINCT TREATMENT)    Braces/Orthotics/Lines/Etc:   · O2 Device: Room Air   Treatment/Session Assessment:    · Response to Treatment:  see above. Works very hard with therapy. · Interdisciplinary Collaboration:   o Physical Therapist  o Registered Nurse  · After treatment position/precautions:   o Supine in bed  o Bed alarm/tab alert on  o Bed/Chair-wheels locked  o Bed in low position  o Call light within reach  o RN notified  o Side rails x 3  o Positioned to comfort; NAD; bed alarm activated; patient endorses comfort   · Compliance with Program/Exercises: Compliant all of the time  · Recommendations/Intent for next treatment session: \"Next visit will focus on advancements to more challenging activities and reduction in assistance provided\".     Total Treatment Duration:  PT Patient Time In/Time Out  Time In: 1105  Time Out: 4[address] [facility]

## 2020-01-03 NOTE — PROGRESS NOTES
01/03/20 0744   NIH Stroke Scale   Interval Other (comment)  ([facility] with Enrique Magallon RN)   LOC 0   LOC Questions 0   LOC Commands 0   Best Gaze 0   Visual 0   Facial Palsy 2   Motor Right Arm 0   Motor Left Arm 4   Motor Right Leg 0   Motor Left Leg 4   Limb Ataxia 0   Sensory 0   Best Language 0   Dysarthria 1   Extinction and Inattention 0   Total 11

## 2020-01-03 NOTE — PROGRESS NOTES
Hospitalist Progress Note     Admit Date:  2019  9:07 AM   Name:  Tammy Montes   Age:  53 y.o.  :  1975   MRN:  [id number]   PCP:  Delwin Cooks, MD  Treatment Team: Attending Provider: Olivia Valdes MD; Consulting Provider: Calvin Pacheco MD; Care Manager: Eric Ellison, RN; Care Manager: Lamar Huitron LMSW; Consulting Provider: Olivia Ramirez MD; Utilization Review: Saba Hardin RN; Charge Nurse: Vanesa Pacheco; Nurse Practitioner: Beverly Estes NP; Speech Language Pathologist: Jb Braden; Occupational Therapy Assistant: Brenna Villareal; Physical Therapist: Suyapa Esquivel DPT    Subjective:   HPI and or CC:  HPI: Mr. Radha Roldan is a 54 y/o smoker with DM, HTN, who presented to ER  with L leg and arm weakness, L facial droop, dysarthria. First noted L leg weakness late night  when he woke up to go to the bathroom. He was normal when he went to bed around 1030. EMS called. Noted to have slurred speech and L facial droop as well. Code S called in ER around 9am.  MRI with acute infarcts in L cerebellar hemisphere, deep frontoparietal white matter, and R paramedian yariel. Also noted old lacunar infarcts. No large vessel occlusion on CTA, but some stenosis noted. He was started on asa, statins. Neurology consulted. An ECHO showed PFO. Plan for cardiology referral and evaluate for closure as outpatient. PT/OT suggested inpatient rehabilitation. The patient is uninsured and the only option at this moment is [facility].    1/3:  Patient sitting up in chair. Slurred speech, baseline. PT/OT/ST following. CM assisting with discharge planning. No CP, no SOB noted.          Objective:     Patient Vitals for the past 24 hrs:   Temp Pulse Resp BP SpO2   20 1200 98.1 °F (36.7 °C) 63 18 102/73 99 %   20 0800 98.3 °F (36.8 °C) (!) 57 18 109/80 94 %   20 0400 97.3 °F (36.3 °C) 62 18 121/86 95 %   20 0000 97.6 °F (36.4 °C) 62 19 125/83 95 %   20 2000 98.4 °F (36.9 °C) 77 18 108/81 94 %   01/02/20 1600 98.9 °F (37.2 °C) 79 17 117/76 96 %     Oxygen Therapy  O2 Sat (%): 99 % (01/03/20 1200)  Pulse via Oximetry: 73 beats per minute (12/16/19 1615)  O2 Device: Room air (01/03/20 0750)    Intake/Output Summary (Last 24 hours) at 1/3/2020 1302  Last data filed at 1/3/2020 0644  Gross per 24 hour   Intake    Output 650 ml   Net -650 ml         REVIEW OF SYSTEMS: Comprehensive ROS performed and negative except as stated in HPI. Physical Examination:  General:          Well nourished. Alert and oriented. CV:                  RRR. No murmur, rub, or gallop. Lungs:             Clear to auscultation bilaterally. No wheezing, rhonchi, or rales  Extremities:     Warm and dry. No cyanosis or edema. Neurologic:      CN II-XII intact except for mild L facial droop. Sensation intact. PERRLA.  dysarthria. No                          confusion. STR 1/5 LUE and LLE. Dysarthria noted. Skin:                No rashes or jaundice. No wounds. Psych:             Appears depressed    Data Review:  I have reviewed all labs, meds, telemetry events, and studies from the last 24 hours.     Recent Results (from the past 24 hour(s))   GLUCOSE, POC    Collection Time: 01/02/20  4:14 PM   Result Value Ref Range    Glucose (POC) 95 65 - 100 mg/dL   GLUCOSE, POC    Collection Time: 01/02/20  8:53 PM   Result Value Ref Range    Glucose (POC) 103 (H) 65 - 100 mg/dL   CBC WITH AUTOMATED DIFF    Collection Time: 01/03/20  5:16 AM   Result Value Ref Range    WBC 5.1 4.3 - 11.1 K/uL    RBC 4.73 4.23 - 5.6 M/uL    HGB 11.9 (L) 13.6 - 17.2 g/dL    HCT 38.4 (L) 41.1 - 50.3 %    MCV 81.2 79.6 - 97.8 FL    MCH 25.2 (L) 26.1 - 32.9 PG    MCHC 31.0 (L) 31.4 - 35.0 g/dL    RDW 17.0 (H) 11.9 - 14.6 %    PLATELET 423 233 - 486 K/uL    MPV 10.9 9.4 - 12.3 FL    ABSOLUTE NRBC 0.00 0.0 - 0.2 K/uL    DF AUTOMATED      NEUTROPHILS 60 43 - 78 %    LYMPHOCYTES 31 13 - 44 %    MONOCYTES 6 4.0 - 12.0 %    EOSINOPHILS 2 0.5 - 7.8 %    BASOPHILS 1 0.0 - 2.0 %    IMMATURE GRANULOCYTES 0 0.0 - 5.0 %    ABS. NEUTROPHILS 3.1 1.7 - 8.2 K/UL    ABS. LYMPHOCYTES 1.6 0.5 - 4.6 K/UL    ABS. MONOCYTES 0.3 0.1 - 1.3 K/UL    ABS. EOSINOPHILS 0.1 0.0 - 0.8 K/UL    ABS. BASOPHILS 0.0 0.0 - 0.2 K/UL    ABS. IMM.  GRANS. 0.0 0.0 - 0.5 K/UL   METABOLIC PANEL, BASIC    Collection Time: 01/03/20  5:16 AM   Result Value Ref Range    Sodium 143 136 - 145 mmol/L    Potassium 3.9 3.5 - 5.1 mmol/L    Chloride 111 (H) 98 - 107 mmol/L    CO2 26 21 - 32 mmol/L    Anion gap 6 (L) 7 - 16 mmol/L    Glucose 98 65 - 100 mg/dL    BUN 18 6 - 23 MG/DL    Creatinine 1.25 0.8 - 1.5 MG/DL    GFR est AA >60 >60 ml/min/1.73m2    GFR est non-AA >60 >60 ml/min/1.73m2    Calcium 10.3 8.3 - 10.4 MG/DL   GLUCOSE, POC    Collection Time: 01/03/20  7:25 AM   Result Value Ref Range    Glucose (POC) 116 (H) 65 - 100 mg/dL   GLUCOSE, POC    Collection Time: 01/03/20 11:39 AM   Result Value Ref Range    Glucose (POC) 136 (H) 65 - 100 mg/dL        All Micro Results     None          Current Meds:  Current Facility-Administered Medications   Medication Dose Route Frequency    metoprolol tartrate (LOPRESSOR) tablet 50 mg  50 mg Oral BID    polyethylene glycol (MIRALAX) packet 17 g  17 g Oral DAILY PRN    insulin glargine (LANTUS) injection 10 Units  10 Units SubCUTAneous QHS    pantoprazole (PROTONIX) tablet 40 mg  40 mg Oral ACB    guaiFENesin (ROBITUSSIN) 100 mg/5 mL oral liquid 100 mg  100 mg Oral Q4H PRN    hydrALAZINE (APRESOLINE) 20 mg/mL injection 20 mg  20 mg IntraVENous Q4H PRN    atorvastatin (LIPITOR) tablet 80 mg  80 mg Oral QHS    amLODIPine (NORVASC) tablet 10 mg  10 mg Oral DAILY    lisinopril (PRINIVIL, ZESTRIL) tablet 40 mg  40 mg Oral DAILY    sodium chloride (NS) flush 5-40 mL  5-40 mL IntraVENous Q8H    sodium chloride (NS) flush 5-40 mL  5-40 mL IntraVENous PRN    ondansetron (ZOFRAN) injection 4 mg  4 mg IntraVENous Q4H PRN    aspirin chewable tablet 81 mg  81 mg Oral DAILY    acetaminophen (TYLENOL) tablet 650 mg  650 mg Oral Q4H PRN    enoxaparin (LOVENOX) injection 40 mg  40 mg SubCUTAneous Q24H    insulin lispro (HUMALOG) injection   SubCUTAneous AC&HS    dextrose 40% (GLUTOSE) oral gel 1 Tube  15 g Oral PRN    glucagon (GLUCAGEN) injection 1 mg  1 mg IntraMUSCular PRN    dextrose (D50W) injection syrg 12.5-25 g  25-50 mL IntraVENous PRN       Diet:  DIET NUTRITIONAL SUPPLEMENTS  DIET DIABETIC CONSISTENT CARB    Other Studies (last 24 hours):  No results found. Assessment and Plan:     Hospital Problems as of 1/3/2020 Date Reviewed: 3/3/2017          Codes Class Noted - Resolved POA    PFO (patent foramen ovale) ICD-10-CM: Q21.1  ICD-9-CM: 745.5  12/17/2019 - Present Yes        Arrhythmia ICD-10-CM: I49.9  ICD-9-CM: 427.9  12/15/2019 - Present Yes        Hyperlipemia ICD-10-CM: E78.5  ICD-9-CM: 272.4  12/15/2019 - Present Yes        * (Principal) Acute embolic stroke [facility]) GONZALO-79-FY: I63.9  ICD-9-CM: 434.11  12/12/2019 - Present Yes        Hypertension (Chronic) ICD-10-CM: I10  ICD-9-CM: 401.9  Unknown - Present Yes        Type 2 diabetes mellitus (HCC) (Chronic) ICD-10-CM: E11.9  ICD-9-CM: 250.00  Unknown - Present Yes              A/P:    Acute embolic stroke  Continues to have left sided neglect  Evaluated by cardiology and neurology  -MRI head: with acute infarcts in L cerebellar hemisphere, deep frontoparietal white matter, and R paramedian yariel. Also noted old lacunar infarcts.    -CTA head: No large vessel occlusion   -ISMAEL: 3 mm PFO     Plan:  -Cont statin, ASA  -PT/OT/ST  -Needs outpatient cardiology follow-up for event monitor and PFO closure.      Active Problems:     Hypertension  BP well controlled at this time  Episodes of bradycardia noted     Plan:  -Continue metoprolol; will adjust dose if repeat episodes of bradycardia  -Continue amlodipine     Type 2 diabetes mellitus - cont on Lantus and SSI     Arrhythmia - had brief wide complex tachycardia early in admission, will need event monitor as outpt per cards     Hyperlipemia - statin therapy     PFO (patent foramen ovale) - will need closure as outpatient per cardiology         Signed:  MANN Parsons

## 2020-01-03 NOTE — PROGRESS NOTES
Hospitalist Progress Note     Admit Date:  2019  9:07 AM   Name:  Edilberto Dos Santos   Age:  53 y.o.  :  1975   MRN:  [id number]   PCP:  Frank Rock MD  Treatment Team: Attending Provider: Zahraa Arreola MD; Consulting Provider: Darren Baca MD; Care Manager: Chidi Rogel RN; Care Manager: Deanna Zarate [facility]; Consulting Provider: Krystle Ken MD; Utilization Review: Jose Rafael Montero RN; Charge Nurse: Tip Alexandre; Nurse Practitioner: Talib Mays NP; Speech Language Pathologist: Zachary Martin; Occupational Therapy Assistant: Elvin David; Physical Therapist: Anjel Ceja DPT    Subjective:   HPI and or CC:  HPI: Mr. Romina Orlando is a 52 y/o smoker with DM, HTN, who presented to ER  with L leg and arm weakness, L facial droop, dysarthria. First noted L leg weakness late night  when he woke up to go to the bathroom. He was normal when he went to bed around 1030. EMS called. Noted to have slurred speech and L facial droop as well. Code S called in ER around 9am.  MRI with acute infarcts in L cerebellar hemisphere, deep frontoparietal white matter, and R paramedian yariel. Also noted old lacunar infarcts. No large vessel occlusion on CTA, but some stenosis noted. He was started on asa, statins. Neurology consulted. An ECHO showed PFO. Plan for cardiology referral and evaluate for closure as outpatient. PT/OT suggested inpatient rehabilitation. The patient is uninsured and the only option at this moment is [facility].    1/3:  Patient sitting up in chair. Slurred speech, baseline. PT/OT following. CM assisting with discharge planning. No CP, no SOB noted.          Objective:     Patient Vitals for the past 24 hrs:   Temp Pulse Resp BP SpO2   20 1200 98.1 °F (36.7 °C) 63 18 102/73 99 %   20 0800 98.3 °F (36.8 °C) (!) 57 18 109/80 94 %   20 0400 97.3 °F (36.3 °C) 62 18 121/86 95 %   20 0000 97.6 °F (36.4 °C) 62 19 125/83 95 %   20 2000 98.4 °F (36.9 °C) 77 18 108/81 94 %   01/02/20 1600 98.9 °F (37.2 °C) 79 17 117/76 96 %     Oxygen Therapy  O2 Sat (%): 99 % (01/03/20 1200)  Pulse via Oximetry: 73 beats per minute (12/16/19 1615)  O2 Device: Room air (01/03/20 0750)    Intake/Output Summary (Last 24 hours) at 1/3/2020 1300  Last data filed at 1/3/2020 0644  Gross per 24 hour   Intake    Output 650 ml   Net -650 ml         REVIEW OF SYSTEMS: Comprehensive ROS performed and negative except as stated in HPI. Physical Examination:  General:          Well nourished. Alert and oriented. CV:                  RRR. No murmur, rub, or gallop. Lungs:             Clear to auscultation bilaterally. No wheezing, rhonchi, or rales  Extremities:     Warm and dry. No cyanosis or edema. Neurologic:      CN II-XII intact except for mild L facial droop. Sensation intact. PERRLA.  dysarthria. No                          confusion. STR 1/5 LUE and LLE. Dysarthria noted. Skin:                No rashes or jaundice. No wounds. Psych:             Appears depressed    Data Review:  I have reviewed all labs, meds, telemetry events, and studies from the last 24 hours.     Recent Results (from the past 24 hour(s))   GLUCOSE, POC    Collection Time: 01/02/20  4:14 PM   Result Value Ref Range    Glucose (POC) 95 65 - 100 mg/dL   GLUCOSE, POC    Collection Time: 01/02/20  8:53 PM   Result Value Ref Range    Glucose (POC) 103 (H) 65 - 100 mg/dL   CBC WITH AUTOMATED DIFF    Collection Time: 01/03/20  5:16 AM   Result Value Ref Range    WBC 5.1 4.3 - 11.1 K/uL    RBC 4.73 4.23 - 5.6 M/uL    HGB 11.9 (L) 13.6 - 17.2 g/dL    HCT 38.4 (L) 41.1 - 50.3 %    MCV 81.2 79.6 - 97.8 FL    MCH 25.2 (L) 26.1 - 32.9 PG    MCHC 31.0 (L) 31.4 - 35.0 g/dL    RDW 17.0 (H) 11.9 - 14.6 %    PLATELET 854 301 - 391 K/uL    MPV 10.9 9.4 - 12.3 FL    ABSOLUTE NRBC 0.00 0.0 - 0.2 K/uL    DF AUTOMATED      NEUTROPHILS 60 43 - 78 %    LYMPHOCYTES 31 13 - 44 %    MONOCYTES 6 4.0 - 12.0 %    EOSINOPHILS 2 0.5 - 7.8 %    BASOPHILS 1 0.0 - 2.0 %    IMMATURE GRANULOCYTES 0 0.0 - 5.0 %    ABS. NEUTROPHILS 3.1 1.7 - 8.2 K/UL    ABS. LYMPHOCYTES 1.6 0.5 - 4.6 K/UL    ABS. MONOCYTES 0.3 0.1 - 1.3 K/UL    ABS. EOSINOPHILS 0.1 0.0 - 0.8 K/UL    ABS. BASOPHILS 0.0 0.0 - 0.2 K/UL    ABS. IMM.  GRANS. 0.0 0.0 - 0.5 K/UL   METABOLIC PANEL, BASIC    Collection Time: 01/03/20  5:16 AM   Result Value Ref Range    Sodium 143 136 - 145 mmol/L    Potassium 3.9 3.5 - 5.1 mmol/L    Chloride 111 (H) 98 - 107 mmol/L    CO2 26 21 - 32 mmol/L    Anion gap 6 (L) 7 - 16 mmol/L    Glucose 98 65 - 100 mg/dL    BUN 18 6 - 23 MG/DL    Creatinine 1.25 0.8 - 1.5 MG/DL    GFR est AA >60 >60 ml/min/1.73m2    GFR est non-AA >60 >60 ml/min/1.73m2    Calcium 10.3 8.3 - 10.4 MG/DL   GLUCOSE, POC    Collection Time: 01/03/20  7:25 AM   Result Value Ref Range    Glucose (POC) 116 (H) 65 - 100 mg/dL   GLUCOSE, POC    Collection Time: 01/03/20 11:39 AM   Result Value Ref Range    Glucose (POC) 136 (H) 65 - 100 mg/dL        All Micro Results     None          Current Meds:  Current Facility-Administered Medications   Medication Dose Route Frequency    metoprolol tartrate (LOPRESSOR) tablet 50 mg  50 mg Oral BID    polyethylene glycol (MIRALAX) packet 17 g  17 g Oral DAILY PRN    insulin glargine (LANTUS) injection 10 Units  10 Units SubCUTAneous QHS    pantoprazole (PROTONIX) tablet 40 mg  40 mg Oral ACB    guaiFENesin (ROBITUSSIN) 100 mg/5 mL oral liquid 100 mg  100 mg Oral Q4H PRN    hydrALAZINE (APRESOLINE) 20 mg/mL injection 20 mg  20 mg IntraVENous Q4H PRN    atorvastatin (LIPITOR) tablet 80 mg  80 mg Oral QHS    amLODIPine (NORVASC) tablet 10 mg  10 mg Oral DAILY    lisinopril (PRINIVIL, ZESTRIL) tablet 40 mg  40 mg Oral DAILY    sodium chloride (NS) flush 5-40 mL  5-40 mL IntraVENous Q8H    sodium chloride (NS) flush 5-40 mL  5-40 mL IntraVENous PRN    ondansetron (ZOFRAN) injection 4 mg  4 mg IntraVENous Q4H PRN    aspirin chewable tablet 81 mg  81 mg Oral DAILY    acetaminophen (TYLENOL) tablet 650 mg  650 mg Oral Q4H PRN    enoxaparin (LOVENOX) injection 40 mg  40 mg SubCUTAneous Q24H    insulin lispro (HUMALOG) injection   SubCUTAneous AC&HS    dextrose 40% (GLUTOSE) oral gel 1 Tube  15 g Oral PRN    glucagon (GLUCAGEN) injection 1 mg  1 mg IntraMUSCular PRN    dextrose (D50W) injection syrg 12.5-25 g  25-50 mL IntraVENous PRN       Diet:  DIET NUTRITIONAL SUPPLEMENTS  DIET DIABETIC CONSISTENT CARB    Other Studies (last 24 hours):  No results found. Assessment and Plan:     Hospital Problems as of 1/3/2020 Date Reviewed: 3/3/2017          Codes Class Noted - Resolved POA    PFO (patent foramen ovale) ICD-10-CM: Q21.1  ICD-9-CM: 745.5  12/17/2019 - Present Yes        Arrhythmia ICD-10-CM: I49.9  ICD-9-CM: 427.9  12/15/2019 - Present Yes        Hyperlipemia ICD-10-CM: E78.5  ICD-9-CM: 272.4  12/15/2019 - Present Yes        * (Principal) Acute embolic stroke [facility]) ZTI-96-UG: I63.9  ICD-9-CM: 434.11  12/12/2019 - Present Yes        Hypertension (Chronic) ICD-10-CM: I10  ICD-9-CM: 401.9  Unknown - Present Yes        Type 2 diabetes mellitus (HCC) (Chronic) ICD-10-CM: E11.9  ICD-9-CM: 250.00  Unknown - Present Yes              A/P:    Acute embolic stroke  Continues to have left sided neglect  Evaluated by cardiology and neurology  -MRI head: with acute infarcts in L cerebellar hemisphere, deep frontoparietal white matter, and R paramedian yariel. Also noted old lacunar infarcts.    -CTA head: No large vessel occlusion   -ISMAEL: 3 mm PFO     Plan:  -Cont statin, ASA  -PT/OT/ST  -Needs outpatient cardiology follow-up for event monitor and PFO closure.      Active Problems:     Hypertension  BP well controlled at this time  Episodes of bradycardia noted     Plan:  -Continue metoprolol; will adjust dose if repeat episodes of bradycardia  -Continue amlodipine     Type 2 diabetes mellitus - cont on Lantus and SSI     Arrhythmia - had brief wide complex tachycardia early in admission, will need event monitor as outpt per cards     Hyperlipemia - statin therapy     PFO (patent foramen ovale) - will need closure as outpatient per cardiology         Signed:  MANN Mcbride

## 2020-01-03 NOTE — PROGRESS NOTES
Neurolinguistic Goals:  LTG: Patient will increase neuro-linguistic abilities to increase safety and awareness in functional living environment   STG: Patient will participate in speech/language/cognitive evaluation.  MET 12/17/19  STG: Patient will solve mathematical problems with 80%accuracy given minimal cueing   STG: Patient will name 10 items to concrete category in 1 minute given minimal cueing  STG: Patient will participate in verbal reasoning tasks with 80% accuracy given minimal cueing  STG: Patient will complete mental manipulation tasks with 80% accuracy given minimal cueing  STG: Patient will complete working memory/attention tasks with 80% accuracy given minimal cueing  STG: Patient will recall relevant verbal information with 80% accuracy with minimal assistance  STG: Patient will utilize memory compensatory strategies to improve recall of functional list/message with 90%accuracy given minimal assistance  STG: Patient will participate in ongoing cognitive linguistic evaluation for therapeutic assessment      Dysarthria Goals:  LTG: Patient will develop functional and intelligible speech and utilize compensatory strategies to improve communication across environments  STG: Patient will fill in carrier phrase/complete automatic speech tasks with 95% accuracy given minimal cueing  STG: Patient will repeat phrases, sentences with 85% accuracy given minimal cueing  STG: Patient will utilize compensatory strategies across tasks with 90% accuracy given minimal cueing    SPEECH LANGUAGE PATHOLOGY: SPEECH-LANGUAGE/COGNITION: Daily Note 7    NAME/AGE/GENDER: Jenine Oppenheim is a 53 y.o. male  DATE: 1/3/2020  PRIMARY DIAGNOSIS: Acute ischemic stroke ([facility] [address] [I63.9]  Cerebrovascular accident (CVA) due to embolism ([facility] [address] [I63.9]      ICD-10: Treatment Diagnosis: R47.1 Dysarthria and Anarthria  R41.841 Cognitive-Communication Deficit    INTERDISCIPLINARY COLLABORATION: n/a  PRECAUTIONS/ALLERGIES: Patient has no known allergies. SUBJECTIVE   Agreeable to therapy. No visitors present. Problem List:  (Impairments causing functional limitations):  1. Cognitive linguistic deficits  2. Dysarthria     Orientation:   Person  Place  Time  Situation     Pain: Pain Scale 1: Numeric (0 - 10)  Pain Intensity 1: 0        OBJECTIVE   The following treatment tasks were completed:    Verbal sequencing 3 items by degree/occurence:  4/4  Category inclusion/exclusion given 4 words: 4/5; 5/5 with repetition  Repeat 4 words using dysarthria strategies: 6/6 with min-mod cueing      ASSESSMENT   Moderate dysarthria and cognitive linguistic deficits. Improved carryover of dysarthria strategies during structured tasks, however still requires min to min-mod cueing in structured tasks and significant cueing in unstructured tasks especially with increased sentence and word length. Delayed initiation and occasional groping observed. Patient benefited from increased time during verbal reasoning and sequencing tasks. Patient will benefit from ongoing skilled intervention to improve speech intelligibility and cognitive linguistic skills during this hospitalization and at next level of care. Tool Used: MODIFIED BRITT SCALE (mRS)   Score   No Symptoms  [] 0   No significant disability despite symptoms; able to carry out all usual duties and activities  [] 1   Slight disability; unable to carry out all previous activities but able to look after own affairs without assistance.    [] 2   Moderate disability; requiring some help but able to walk without assistance  [] 3   Moderately severe disability; unable to walk without assistance and unable to attend to own bodily needs without assistance  [] 4   Severe disability; bedridden, incontinent, and requiring constant nursing care and attention  [] 5      Score:  Initial: 2 Most Recent: 2  (Date 01/03/20 )   Interpretation of Tool: The Modified [place] Scale is a 7-point scaled used to quantify level of disability as it relates to a patient's functional abilities. PLAN    FREQUENCY/DURATION: Continue to follow patient 3 times a week for duration of hospital stay to address above goals. - Recommendations for next treatment session: Next treatment will address cognitive linguistic and dysarthria treatment   REHABILITATION POTENTIAL FOR STATED GOALS: Excellent           RECOMMENDATIONS   STRATEGIES:   · Dysarthria strategies  · Memory strategies      EDUCATION:  · Recommendations discussed with patient      RECOMMENDATIONS for CONTINUED SPEECH THERAPY: YES: Anticipate need for ongoing speech therapy during this hospitalization and at next level of care. Compliance with Program/Exercises: Will assess as treatment progresses  Continuation of Skilled Services/Medical Necessity:   Patient is expected to demonstrate progress in cognitive ability and dysarthria to decrease assistance required communication, increase independence with activities of daily living and increase communication with family/caregivers.  Patient continues to require skilled intervention due to dysarthria and cognitive linguistic deficits.      SAFETY:  After treatment position/precautions:  · Upright in bed  · Wife at bedside  · Call light within reach    Total Treatment Duration:   Time In: 8702  Time Out: AMADEO Padgett [facility] PANCHO ZEKE ARRIAZA CCC-SLP

## 2020-01-03 NOTE — PROGRESS NOTES
Problem: Self Care Deficits Care Plan (Adult)  Goal: *Acute Goals and Plan of Care (Insert Text)  Description  1. Patient will complete sitting balance edge of bed with ADL tasks/OT activity with supervision. 2. Patient will demonstrate appropriate safety awareness and protection of L UE during bed mobility and functional transfers with minimal cues. 3. Patient will complete upper body bathing/dressing with minimal assistance to improve participation with ADL. 4. Patient will complete weightbearing into the L UE with ADL tasks with minimal assistance to improve ability to use as a functional assist during ADL tasks. 5. Patient will participation in 8 minutes of therapeutic exercises with moderate assistance to improve strength in the L UE for ADL/functional transfers. 6. Patient will attempt transfer to the chair/BSC within 3 visits to demonstrate advancement with functional transfers. Timeframe: 7 visits   Outcome: Progressing Towards Goal     OCCUPATIONAL THERAPY: Daily Note and AM    1/3/2020  INPATIENT: OT Visit Days: 4  Payor: MEDICAID PENDING / Plan: Franck Salamanca PENDING / Product Type: Medicaid /      NAME/AGE/GENDER: Henry Smiley is a 53 y.o. male   PRIMARY DIAGNOSIS:  Acute ischemic stroke ([address] [I63.9]  Cerebrovascular accident (CVA) due to embolism ([address] [L97.7] Acute embolic stroke ([address] Acute embolic stroke ([address]       ICD-10: Treatment Diagnosis:    · Generalized Muscle Weakness (M62.81)  · Other lack of cordination (R27.8)  · Hemiplegia and hemiparesis following cerebral infarction affecting   · left non-dominant side (I69.354)  · Abnormal posture (R29.3)   Precautions/Allergies:     Patient has no known allergies. ASSESSMENT:     Mr. Breanna Colbert presents to the hospital with L sided weakness and acute ischemic CVA. MRI revealed acute to subacute L cerebellar and R paramedian yariel infarcts.    1/3/2020 Pt presents in supine upon arrival and transferred to sitting with min/mod a and additional time. Pt sat edge of bed working on increasing balance and postural alignment and neuro muscular re-ed. Pt completed sit to stand and SPT from the bed to the chair with mod a x's 2. Pt is making good progress with sitting balance, less neglect, and better participation with transfers. Continue POC. This section established at most recent assessment   PROBLEM LIST (Impairments causing functional limitations):  1. Decreased Strength  2. Decreased ADL/Functional Activities  3. Decreased Transfer Abilities  4. Decreased Ambulation Ability/Technique  5. Decreased Balance  6. Decreased Activity Tolerance  7. Increased Fatigue  8. Decreased Flexibility/Joint Mobility  9. Decreased Knowledge of Precautions  10. Decreased [place] with Home Exercise Program  11. Decreased Cognition   INTERVENTIONS PLANNED: (Benefits and precautions of occupational therapy have been discussed with the patient.)  1. Activities of daily living training  2. Adaptive equipment training  3. Balance training  4. Clothing management  5. Cognitive training  6. Donning&doffing training  7. Keanu tech training  8. Neuromuscular re-eduation  9. Therapeutic activity  10. Therapeutic exercise     TREATMENT PLAN: Frequency/Duration: Follow patient 3 times per week to address above goals. Rehabilitation Potential For Stated Goals: Excellent     REHAB RECOMMENDATIONS (at time of discharge pending progress):    Placement: It is my opinion, based on this patient's performance to date, that Mr. Annie Sykes may benefit from intensive therapy at an [address] [place] [address] after discharge due to potential to make ongoing and sustainable functional improvement that is of practical value. .  Equipment:    TBD               OCCUPATIONAL PROFILE AND HISTORY:   History of Present Injury/Illness (Reason for Referral):  See H&P  Past Medical History/Comorbidities:   Mr. Annie Sykes  has a past medical history of Acute ischemic stroke ([facility] (12/12/2019), Acute pancreatitis (11/19/2014), Cerebrovascular accident (CVA) due to embolism ([facility] (12/12/2019), Diabetes ([facility] (2002), Diabetes ([facility] Diabetes mellitus, and Hypertension. He also has no past medical history of Arthritis, Asthma, Autoimmune disease ([facility] CAD (coronary artery disease), Cancer ([address] Chronic kidney disease, COPD, Dementia, Dementia ([address] Heart failure ([address] Ill-defined condition, Infectious disease, Liver disease, Other ill-defined conditions(799.89), Psychiatric disorder, PUD (peptic ulcer disease), Seizures ([facility] or Sleep disorder. Mr. Elvin Farooq  has a past surgical history that includes hx hernia repair and hx orthopaedic. Social History/Living Environment:   Home Environment: Apartment  # Steps to Enter: 12  Rails to Enter: Yes  [employer] : Bilateral  One/Two Story Residence: One story  Living Alone: No  Support Systems: Spouse/Significant Other/Partner  Patient Expects to be Discharged to[de-identified] Unknown  Current DME Used/Available at Home: None  Tub or Shower Type: Tub/Shower combination  Prior Level of Function/Work/Activity:  Pt lives at home with his wife. Pt is typically independent with ADL/functional mobility. Pt does not drive. Pt was working part-time at [employer]. Personal Factors:          Age:  53 y.o. Past/Current Experience:  CVA with flaccid L side        Other factors that influence how disability is experienced by the patient:  multiple co-morbidities    Number of Personal Factors/Comorbidities that affect the Plan of Care: Extensive review of physical, cognitive, and psychosocial performance (3+):  HIGH COMPLEXITY   ASSESSMENT OF OCCUPATIONAL PERFORMANCE[de-identified]   Activities of Daily Living:   Basic ADLs (From Assessment) Complex ADLs (From Assessment)   Feeding: Stand-by assistance  Oral Facial Hygiene/Grooming: Moderate assistance  Bathing: Maximum assistance  Upper Body Dressing: Maximum assistance  Lower Body Dressing: Total assistance  Toileting:  Total assistance Instrumental ADL  Meal Preparation: Total assistance  Homemaking: Total assistance   Grooming/Bathing/Dressing Activities of Daily Living                             Bed/Mat Mobility  Rolling: Minimum assistance  Supine to Sit: Minimum assistance; Moderate assistance  Sit to Stand: Moderate assistance;Assist x2  Stand to Sit: Moderate assistance;Assist x2     Most Recent Physical Functioning:   Gross Assessment:                  Posture:     Balance:  Sitting: Impaired  Sitting - Static: Fair (occasional)  Sitting - Dynamic: Poor (constant support)  Standing: Impaired  Standing - Static: Poor  Standing - Dynamic : Poor Bed Mobility:  Rolling: Minimum assistance  Supine to Sit: Minimum assistance; Moderate assistance  Wheelchair Mobility:     Transfers:  Sit to Stand: Moderate assistance;Assist x2  Stand to Sit: Moderate assistance;Assist x2  Stand Pivot Transfers: Moderate assistance;Maximum assistance;Assist x2            Patient Vitals for the past 6 hrs:   BP BP Patient Position SpO2 Pulse   20 0800 109/80 At rest 94 % (!) 57   20 1200 102/73 At rest 99 % 63       Mental Status  Neurologic State: Alert, Eyes open spontaneously  Orientation Level: Oriented X4  Cognition: Follows commands  Perception: Cues to attend to left side of body, Cues to maintain midline in standing, Tactile, Verbal, Visual  Perseveration: No perseveration noted  Safety/Judgement: Fall prevention                          Physical Skills Involved:  1. Range of Motion  2. Balance  3. Strength  4. Activity Tolerance  5. Fine Motor Control  6. Gross Motor Control Cognitive Skills Affected (resulting in the inability to perform in a timely and safe manner):  1. Perception  2. Expression Psychosocial Skills Affected:  1. Habits/Routines  2.  Self-Awareness   Number of elements that affect the Plan of Care: 5+:  HIGH COMPLEXITY   CLINICAL DECISION MAKIN [facility] [address] [address] AM-PAC 6 [facility]   Daily Activity Inpatient [place] Form  How much help from another person does the patient currently need. .. Total A Lot A Little None   1. Putting on and taking off regular lower body clothing? [x] 1   [] 2   [] 3   [] 4   2. Bathing (including washing, rinsing, drying)? [] 1   [x] 2   [] 3   [] 4   3. Toileting, which includes using toilet, bedpan or urinal?   [x] 1   [] 2   [] 3   [] 4   4. Putting on and taking off regular upper body clothing? [] 1   [x] 2   [] 3   [] 4   5. Taking care of personal grooming such as brushing teeth? [] 1   [x] 2   [] 3   [] 4   6. Eating meals? [] 1   [] 2   [x] 3   [] 4   © 2007, Trustees of [facility] [employer], under license to [employer]. All rights reserved      Score:  Initial: 11 Most Recent: X (Date: -- )    Interpretation of Tool:  Represents activities that are increasingly more difficult (i.e. Bed mobility, Transfers, Gait). Medical Necessity:     · Patient demonstrates   · good and excellent  ·  rehab potential due to higher previous functional level. Reason for Services/Other Comments:  · Patient continues to require skilled intervention due to   · Decreased independence with ADL/functional transfers that impacts overall quality of life. · .   Use of outcome tool(s) and clinical judgement create a POC that gives a: MODERATE COMPLEXITY         TREATMENT:   (In addition to Assessment/Re-Assessment sessions the following treatments were rendered)     Pre-treatment Symptoms/Complaints:    Pain: Initial:   Pain Intensity 1: 0  Post Session:  0       Neuromuscular Re-education: (14 minutes):  Exercise/activities per grid below to improve balance, coordination, kinesthetic sense, posture and proprioception. Required maximal visual, verbal and manual cues to promote motor control of right, upper extremity(s), trunk.   Re-Education Training  Re-Education Training: Yes   LLE Re-Education  Other Activities: lateral weight bearing through his L UE to increase biofeedback and proprioceptionTrunk Re-Education  Muscle Facilitation: lateral weight bearing through his L UE to increase biofeedback and proprioception  Muscle Inhibition: trunk flexion and left lateral lean  Integration Activities: R UE to increase midline    Therapeutic Activity: (14 minutes): Therapeutic activities including Bed transfers, Chair transfers and static standing and SPT to improve mobility, strength and balance. Required moderate assist to promote static and dynamic balance in standing. Date:  12/15 Date:   Date:     Activity/Exercise Parameters Parameters Parameters   Shifting weight side to side and weightbearing into L UE 10 reps      Trunk flexion   5 reps      Trunk extension to upright posture 5 reps      Dynamic reaching to the L 15 reps      Identifying and visually attending to L side 6 reps                      Braces/Orthotics/Lines/Etc:   ·  None  Treatment/Session Assessment:    · Response to Treatment:  Pt demonstrated good participation. · Interdisciplinary Collaboration:   o Certified Occupational Therapy Assistant  o Registered Nurse  o Rehabilitation Attendant  · After treatment position/precautions:   o Up in chair  o Bed alarm/tab alert on  o Bed/Chair-wheels locked  o Call light within reach  o RN notified   · Compliance with Program/Exercises: Will assess as treatment progresses. · Recommendations/Intent for next treatment session: \"Next visit will focus on advancements to more challenging activities and reduction in assistance provided\".   Total Treatment Duration:  OT Patient Time In/Time Out  Time In: 1105  Time Out: 409 [facility]

## 2020-01-04 LAB
GLUCOSE BLD STRIP.AUTO-MCNC: 107 MG/DL (ref 65–100)
GLUCOSE BLD STRIP.AUTO-MCNC: 111 MG/DL (ref 65–100)
GLUCOSE BLD STRIP.AUTO-MCNC: 148 MG/DL (ref 65–100)
GLUCOSE BLD STRIP.AUTO-MCNC: 88 MG/DL (ref 65–100)

## 2020-01-04 PROCEDURE — 94760 N-INVAS EAR/PLS OXIMETRY 1: CPT

## 2020-01-04 PROCEDURE — 74011250637 HC RX REV CODE- 250/637: Performed by: HOSPITALIST

## 2020-01-04 PROCEDURE — 77030019605

## 2020-01-04 PROCEDURE — 82962 GLUCOSE BLOOD TEST: CPT

## 2020-01-04 PROCEDURE — 65660000000 HC RM CCU STEPDOWN

## 2020-01-04 PROCEDURE — 74011250637 HC RX REV CODE- 250/637: Performed by: PHYSICIAN ASSISTANT

## 2020-01-04 PROCEDURE — 74011250636 HC RX REV CODE- 250/636: Performed by: INTERNAL MEDICINE

## 2020-01-04 PROCEDURE — 74011636637 HC RX REV CODE- 636/637: Performed by: INTERNAL MEDICINE

## 2020-01-04 PROCEDURE — 74011250637 HC RX REV CODE- 250/637: Performed by: INTERNAL MEDICINE

## 2020-01-04 PROCEDURE — 65270000029 HC RM PRIVATE

## 2020-01-04 RX ADMIN — LISINOPRIL 40 MG: 20 TABLET ORAL at 09:10

## 2020-01-04 RX ADMIN — GUAIFENESIN 100 MG: 100 SOLUTION ORAL at 17:58

## 2020-01-04 RX ADMIN — ACETAMINOPHEN 650 MG: 325 TABLET, FILM COATED ORAL at 17:52

## 2020-01-04 RX ADMIN — Medication 10 ML: at 23:21

## 2020-01-04 RX ADMIN — GUAIFENESIN 100 MG: 100 SOLUTION ORAL at 09:09

## 2020-01-04 RX ADMIN — ATORVASTATIN CALCIUM 80 MG: 80 TABLET, FILM COATED ORAL at 23:20

## 2020-01-04 RX ADMIN — PANTOPRAZOLE SODIUM 40 MG: 40 TABLET, DELAYED RELEASE ORAL at 06:40

## 2020-01-04 RX ADMIN — METOPROLOL TARTRATE 50 MG: 50 TABLET, FILM COATED ORAL at 17:53

## 2020-01-04 RX ADMIN — Medication 10 ML: at 06:40

## 2020-01-04 RX ADMIN — ENOXAPARIN SODIUM 40 MG: 40 INJECTION SUBCUTANEOUS at 14:56

## 2020-01-04 RX ADMIN — METOPROLOL TARTRATE 50 MG: 50 TABLET, FILM COATED ORAL at 09:10

## 2020-01-04 RX ADMIN — AMLODIPINE BESYLATE 10 MG: 10 TABLET ORAL at 09:10

## 2020-01-04 RX ADMIN — Medication 10 ML: at 14:57

## 2020-01-04 RX ADMIN — ACETAMINOPHEN 650 MG: 325 TABLET, FILM COATED ORAL at 09:10

## 2020-01-04 RX ADMIN — INSULIN GLARGINE 10 UNITS: 100 INJECTION, SOLUTION SUBCUTANEOUS at 23:23

## 2020-01-04 RX ADMIN — ASPIRIN 81 MG 81 MG: 81 TABLET ORAL at 09:10

## 2020-01-04 NOTE — PROGRESS NOTES
01/03/20 1920   NIH Stroke Scale   Interval Other (comment)   LOC 0   LOC Questions 0   LOC Commands 0   Best Gaze 0   Visual 0   Facial Palsy 2   Motor Right Arm 0   Motor Left Arm 4   Motor Right Leg 0   Motor Left Leg 4   Limb Ataxia 0   Sensory 0   Best Language 0   Dysarthria 1   Extinction and Inattention 0   Total 11   Dual NIH with Sylvia,RN

## 2020-01-04 NOTE — PROGRESS NOTES
01/04/20 0722   NIH Stroke Scale   Interval Other (comment)  (NIH with Felice VARMA RN )   LOC 0   LOC Questions 0   LOC Commands 0   Best Gaze 0   Visual 0   Facial Palsy 2   Motor Right Arm 0   Motor Left Arm 4   Motor Right Leg 0   Motor Left Leg 4   Limb Ataxia 0   Sensory 0   Best Language 0   Dysarthria 1   Extinction and Inattention 0   Total 11

## 2020-01-04 NOTE — PROGRESS NOTES
Visit with patient to build rapport with .   Calm  Encouraged with presence and words of tika Samuels,  Staff   C: [phone]  /  [email or web address]

## 2020-01-04 NOTE — PROGRESS NOTES
Hospitalist Progress Note     Admit Date:  2019  9:07 AM   Name:  Alanna Boss   Age:  53 y.o.  :  1975   MRN:  [id number]   PCP:  Nic Deluna MD  Treatment Team: Attending Provider: José Luis Sinclair MD; Consulting Provider: Keyana Sheridan MD; Care Manager: Kacy Ozuna RN; Care Manager: Fabiola Garcia, [facility]; Consulting Provider: Juvencio Camilo MD; Utilization Review: Rylan Asencio RN; Nurse Practitioner: Carlyle Tse NP    Subjective:   HPI and or CC:  HPI: Mr. Kyung Cisse is a 52 y/o smoker with DM, HTN, who presented to ER  with L leg and arm weakness, L facial droop, dysarthria. First noted L leg weakness late night  when he woke up to go to the bathroom. He was normal when he went to bed around 1030. EMS called. Noted to have slurred speech and L facial droop as well. Code S called in ER around 9am.  MRI with acute infarcts in L cerebellar hemisphere, deep frontoparietal white matter, and R paramedian yariel. Also noted old lacunar infarcts. No large vessel occlusion on CTA, but some stenosis noted. He was started on asa, statins. Neurology consulted. An ECHO showed PFO. Plan for cardiology referral and evaluate for closure as outpatient. PT/OT suggested inpatient rehabilitation. The patient is uninsured and the only option at this moment is [facility].    :  Patient sitting up in bed. Slurred speech, baseline. Can make needs known. PT/OT/ST following. CM assisting with discharge planning. No CP, no SOB noted. Afebrile, no leukocytosis.         Objective:     Patient Vitals for the past 24 hrs:   Temp Pulse Resp BP SpO2   20 0800 97.7 °F (36.5 °C) 86 18 110/78 92 %   20 0400 97.8 °F (36.6 °C) 73 19 110/74 94 %   20 0000 97.8 °F (36.6 °C) 67 18 112/78 92 %   20 2000 97.6 °F (36.4 °C) 76 19 127/84 94 %   20 1600 98 °F (36.7 °C) 64 18 118/70 92 %   20 1200 98.1 °F (36.7 °C) 63 18 102/73 99 %     Oxygen Therapy  O2 Sat (%): 92 % (20 0800)  Pulse via Oximetry: 73 beats per minute (12/16/19 1615)  O2 Device: Room air (01/03/20 0750)  No intake or output data in the 24 hours ending 01/04/20 0846      REVIEW OF SYSTEMS: Comprehensive ROS performed and negative except as stated in HPI. Physical Examination:  General:          Well nourished. Alert and oriented. CV:                  RRR. No murmur, rub, or gallop. Lungs:             Clear to auscultation bilaterally. No wheezing, rhonchi, or rales  Extremities:     Warm and dry. No cyanosis or edema. Neurologic:      CN II-XII intact except for mild L facial droop. Sensation intact. PERRLA.  dysarthria. No                          confusion. STR 1/5 LUE and LLE. Dysarthria noted. Skin:                No rashes or jaundice. No wounds. Psych:             Appears depressed    Data Review:  I have reviewed all labs, meds, telemetry events, and studies from the last 24 hours.     Recent Results (from the past 24 hour(s))   GLUCOSE, POC    Collection Time: 01/03/20 11:39 AM   Result Value Ref Range    Glucose (POC) 136 (H) 65 - 100 mg/dL   GLUCOSE, POC    Collection Time: 01/03/20  4:00 PM   Result Value Ref Range    Glucose (POC) 194 (H) 65 - 100 mg/dL   GLUCOSE, POC    Collection Time: 01/03/20  8:42 PM   Result Value Ref Range    Glucose (POC) 93 65 - 100 mg/dL   GLUCOSE, POC    Collection Time: 01/04/20  6:43 AM   Result Value Ref Range    Glucose (POC) 88 65 - 100 mg/dL        All Micro Results     None          Current Meds:  Current Facility-Administered Medications   Medication Dose Route Frequency    metoprolol tartrate (LOPRESSOR) tablet 50 mg  50 mg Oral BID    polyethylene glycol (MIRALAX) packet 17 g  17 g Oral DAILY PRN    insulin glargine (LANTUS) injection 10 Units  10 Units SubCUTAneous QHS    pantoprazole (PROTONIX) tablet 40 mg  40 mg Oral ACB    guaiFENesin (ROBITUSSIN) 100 mg/5 mL oral liquid 100 mg  100 mg Oral Q4H PRN    hydrALAZINE (APRESOLINE) 20 mg/mL injection 20 mg  20 mg IntraVENous Q4H PRN    atorvastatin (LIPITOR) tablet 80 mg  80 mg Oral QHS    amLODIPine (NORVASC) tablet 10 mg  10 mg Oral DAILY    lisinopril (PRINIVIL, ZESTRIL) tablet 40 mg  40 mg Oral DAILY    sodium chloride (NS) flush 5-40 mL  5-40 mL IntraVENous Q8H    sodium chloride (NS) flush 5-40 mL  5-40 mL IntraVENous PRN    ondansetron (ZOFRAN) injection 4 mg  4 mg IntraVENous Q4H PRN    aspirin chewable tablet 81 mg  81 mg Oral DAILY    acetaminophen (TYLENOL) tablet 650 mg  650 mg Oral Q4H PRN    enoxaparin (LOVENOX) injection 40 mg  40 mg SubCUTAneous Q24H    insulin lispro (HUMALOG) injection   SubCUTAneous AC&HS    dextrose 40% (GLUTOSE) oral gel 1 Tube  15 g Oral PRN    glucagon (GLUCAGEN) injection 1 mg  1 mg IntraMUSCular PRN    dextrose (D50W) injection syrg 12.5-25 g  25-50 mL IntraVENous PRN       Diet:  DIET NUTRITIONAL SUPPLEMENTS  DIET DIABETIC CONSISTENT CARB    Other Studies (last 24 hours):  No results found. Assessment and Plan:     Hospital Problems as of 1/4/2020 Date Reviewed: 3/3/2017          Codes Class Noted - Resolved POA    PFO (patent foramen ovale) ICD-10-CM: Q21.1  ICD-9-CM: 745.5  12/17/2019 - Present Yes        Arrhythmia ICD-10-CM: I49.9  ICD-9-CM: 427.9  12/15/2019 - Present Yes        Hyperlipemia ICD-10-CM: E78.5  ICD-9-CM: 272.4  12/15/2019 - Present Yes        * (Principal) Acute embolic stroke [facility]) [id number]: I63.9  ICD-9-CM: 434.11  12/12/2019 - Present Yes        Hypertension (Chronic) ICD-10-CM: I10  ICD-9-CM: 401.9  Unknown - Present Yes        Type 2 diabetes mellitus (HCC) (Chronic) ICD-10-CM: E11.9  ICD-9-CM: 250.00  Unknown - Present Yes              A/P:    Acute embolic stroke  Continues to have left sided neglect  Evaluated by cardiology and neurology  -MRI head: with acute infarcts in L cerebellar hemisphere, deep frontoparietal white matter, and R paramedian yariel. Also noted old lacunar infarcts.    -CTA head: No large vessel occlusion   -ISMAEL: 3 mm PFO     Plan:  -Cont statin, ASA  -PT/OT/ST  -Needs outpatient cardiology follow-up for event monitor and PFO closure.      Active Problems:     Hypertension  BP well controlled at this time  Episodes of bradycardia noted     Plan:  -Continue metoprolol; will adjust dose if repeat episodes of bradycardia  -Continue amlodipine     Type 2 diabetes mellitus - cont on Lantus and SSI     Arrhythmia - had brief wide complex tachycardia early in admission, will need event monitor as outpt per cards     Hyperlipemia - statin therapy     PFO (patent foramen ovale) - will need closure as outpatient per cardiology         Signed:  MANN Meza

## 2020-01-05 LAB
GLUCOSE BLD STRIP.AUTO-MCNC: 111 MG/DL (ref 65–100)
GLUCOSE BLD STRIP.AUTO-MCNC: 169 MG/DL (ref 65–100)
GLUCOSE BLD STRIP.AUTO-MCNC: 84 MG/DL (ref 65–100)
GLUCOSE BLD STRIP.AUTO-MCNC: 92 MG/DL (ref 65–100)

## 2020-01-05 PROCEDURE — 74011250637 HC RX REV CODE- 250/637: Performed by: PHYSICIAN ASSISTANT

## 2020-01-05 PROCEDURE — 82962 GLUCOSE BLOOD TEST: CPT

## 2020-01-05 PROCEDURE — 65660000000 HC RM CCU STEPDOWN

## 2020-01-05 PROCEDURE — 65270000029 HC RM PRIVATE

## 2020-01-05 PROCEDURE — 94760 N-INVAS EAR/PLS OXIMETRY 1: CPT

## 2020-01-05 PROCEDURE — 74011250637 HC RX REV CODE- 250/637: Performed by: INTERNAL MEDICINE

## 2020-01-05 PROCEDURE — 74011250636 HC RX REV CODE- 250/636: Performed by: INTERNAL MEDICINE

## 2020-01-05 PROCEDURE — 74011636637 HC RX REV CODE- 636/637: Performed by: INTERNAL MEDICINE

## 2020-01-05 PROCEDURE — 74011250637 HC RX REV CODE- 250/637: Performed by: HOSPITALIST

## 2020-01-05 RX ADMIN — ACETAMINOPHEN 650 MG: 325 TABLET, FILM COATED ORAL at 17:07

## 2020-01-05 RX ADMIN — ATORVASTATIN CALCIUM 80 MG: 80 TABLET, FILM COATED ORAL at 22:14

## 2020-01-05 RX ADMIN — Medication 10 ML: at 22:16

## 2020-01-05 RX ADMIN — INSULIN LISPRO 2 UNITS: 100 INJECTION, SOLUTION INTRAVENOUS; SUBCUTANEOUS at 12:40

## 2020-01-05 RX ADMIN — ACETAMINOPHEN 650 MG: 325 TABLET, FILM COATED ORAL at 08:56

## 2020-01-05 RX ADMIN — GUAIFENESIN 100 MG: 100 SOLUTION ORAL at 08:56

## 2020-01-05 RX ADMIN — ASPIRIN 81 MG 81 MG: 81 TABLET ORAL at 08:55

## 2020-01-05 RX ADMIN — METOPROLOL TARTRATE 50 MG: 50 TABLET, FILM COATED ORAL at 17:04

## 2020-01-05 RX ADMIN — Medication 10 ML: at 17:04

## 2020-01-05 RX ADMIN — INSULIN GLARGINE 10 UNITS: 100 INJECTION, SOLUTION SUBCUTANEOUS at 22:00

## 2020-01-05 RX ADMIN — Medication 10 ML: at 06:32

## 2020-01-05 RX ADMIN — GUAIFENESIN 100 MG: 100 SOLUTION ORAL at 17:07

## 2020-01-05 RX ADMIN — LISINOPRIL 40 MG: 20 TABLET ORAL at 08:55

## 2020-01-05 RX ADMIN — PANTOPRAZOLE SODIUM 40 MG: 40 TABLET, DELAYED RELEASE ORAL at 06:30

## 2020-01-05 RX ADMIN — ENOXAPARIN SODIUM 40 MG: 40 INJECTION SUBCUTANEOUS at 14:49

## 2020-01-05 RX ADMIN — METOPROLOL TARTRATE 50 MG: 50 TABLET, FILM COATED ORAL at 08:56

## 2020-01-05 RX ADMIN — AMLODIPINE BESYLATE 10 MG: 10 TABLET ORAL at 08:56

## 2020-01-05 NOTE — PROGRESS NOTES
Visit with patient to build rapport with .   Calm  No family present  Encouraged with presence and words of hope  Acknowledged he has a long road ahead  Prayer for patience      Gomez Holloway,  Staff   C: [phone]  /  [email or web address]

## 2020-01-05 NOTE — PROGRESS NOTES
01/05/20 0719   NIH Stroke Scale   Interval Other (comment)   LOC 0   LOC Questions 0   LOC Commands 0   Best Gaze 0   Visual 0   Facial Palsy 2   Motor Right Arm 0   Motor Left Arm 4   Motor Right Leg 0   Motor Left Leg 4   Limb Ataxia 0   Sensory 0   Best Language 0   Dysarthria 1   Extinction and Inattention 0   Total 11     Dual shift change NIH with Alanna Perez RN.

## 2020-01-05 NOTE — PROGRESS NOTES
Visit with patient and family to build rapport with .   Patient was calm    Family expressed that \"er staff was angry\"  Listened with empathy  Will pass on to patient relations for further contact    Prayer offered  Family was thankful for the visit      Oma Bianchi  Staff   C: [phone]  /  [email or web address]

## 2020-01-05 NOTE — PROGRESS NOTES
01/04/20 1942   NIH Stroke Scale   Interval Other (comment)   LOC 0   LOC Questions 0   LOC Commands 0   Best Gaze 0   Visual 0   Facial Palsy 2   Motor Right Arm 0   Motor Left Arm 4   Motor Right Leg 0   Motor Left Leg 4   Limb Ataxia 0   Sensory 0   Best Language 0   Dysarthria 1   Extinction and Inattention 0   Total 11

## 2020-01-05 NOTE — PROGRESS NOTES
Hospitalist Progress Note     Admit Date:  2019  9:07 AM   Name:  Austin Lo   Age:  [address] y.o.  :  1975   MRN:  [id number]   PCP:  Vivien Alberts MD  Treatment Team: Attending Provider: Danielle Gonzalez MD; Consulting Provider: Ryley Sena MD; Care Manager: Nicole Blanc, RN; Care Manager: Rakeshnick Grimaldo LMSW; Consulting Provider: Cruz Andrade MD; Utilization Review: Alyssa Muniz RN; Nurse Practitioner: Syed Mccarthy NP    Subjective:   HPI and or CC:  HPI: Mr. Krys Grimaldo is a [address] y/o smoker with DM, HTN, who presented to ER  with L leg and arm weakness, L facial droop, dysarthria. First noted L leg weakness late night  when he woke up to go to the bathroom. He was normal when he went to bed around 1030. EMS called. Noted to have slurred speech and L facial droop as well. Code S called in ER around 9am.  MRI with acute infarcts in L cerebellar hemisphere, deep frontoparietal white matter, and R paramedian yariel. Also noted old lacunar infarcts. No large vessel occlusion on CTA, but some stenosis noted. He was started on asa, statins. Neurology consulted. An ECHO showed PFO. Plan for cardiology referral and evaluate for closure as outpatient. PT/OT suggested inpatient rehabilitation. The patient is uninsured and the only option at this moment is [facility].    :  Patient sitting up in bed. Slurred speech, baseline. Can make needs known. PT/OT/ST following. CM assisting with discharge planning. No CP, no SOB noted. Afebrile, no leukocytosis.         Objective:     Patient Vitals for the past 24 hrs:   Temp Pulse Resp BP SpO2   20 0800 97.9 °F (36.6 °C) 60 19 132/82 99 %   20 0400 98.5 °F (36.9 °C) 64 18 117/78 93 %   20 0000 97.5 °F (36.4 °C) (!) 52 18 129/78 95 %   20 2000 97.9 °F (36.6 °C) (!) 53 18 112/73 94 %   20 1942     99 %   20 1600 97.9 °F (36.6 °C) 62 18 128/63 94 %   20 1200 97.6 °F (36.4 °C) (!) 57 18 107/68 96 % Oxygen Therapy  O2 Sat (%): 99 % (01/05/20 0800)  Pulse via Oximetry: 105 beats per minute (01/04/20 1942)  O2 Device: Room air (01/04/20 1942)  No intake or output data in the 24 hours ending 01/05/20 0824      REVIEW OF SYSTEMS: Comprehensive ROS performed and negative except as stated in HPI. Physical Examination:  General:          Well nourished. Alert and oriented. CV:                  RRR. No murmur, rub, or gallop. Lungs:             Clear to auscultation bilaterally. No wheezing, rhonchi, or rales  Extremities:     Warm and dry. No cyanosis or edema. Neurologic:      CN II-XII intact except for mild L facial droop. Sensation intact. PERRLA.  dysarthria. No                          confusion. STR 1/5 LUE and LLE. Dysarthria noted. Skin:                No rashes or jaundice. No wounds. Psych:             Appears depressed    Data Review:  I have reviewed all labs, meds, telemetry events, and studies from the last 24 hours.     Recent Results (from the past 24 hour(s))   GLUCOSE, POC    Collection Time: 01/04/20 10:51 AM   Result Value Ref Range    Glucose (POC) 111 (H) 65 - 100 mg/dL   GLUCOSE, POC    Collection Time: 01/04/20  3:38 PM   Result Value Ref Range    Glucose (POC) 107 (H) 65 - 100 mg/dL   GLUCOSE, POC    Collection Time: 01/04/20  8:49 PM   Result Value Ref Range    Glucose (POC) 148 (H) 65 - 100 mg/dL   GLUCOSE, POC    Collection Time: 01/05/20  7:05 AM   Result Value Ref Range    Glucose (POC) 92 65 - 100 mg/dL        All Micro Results     None          Current Meds:  Current Facility-Administered Medications   Medication Dose Route Frequency    metoprolol tartrate (LOPRESSOR) tablet 50 mg  50 mg Oral BID    polyethylene glycol (MIRALAX) packet 17 g  17 g Oral DAILY PRN    insulin glargine (LANTUS) injection 10 Units  10 Units SubCUTAneous QHS    pantoprazole (PROTONIX) tablet 40 mg  40 mg Oral ACB    guaiFENesin (ROBITUSSIN) 100 mg/5 mL oral liquid 100 mg  100 mg Oral Q4H PRN    hydrALAZINE (APRESOLINE) 20 mg/mL injection 20 mg  20 mg IntraVENous Q4H PRN    atorvastatin (LIPITOR) tablet 80 mg  80 mg Oral QHS    amLODIPine (NORVASC) tablet 10 mg  10 mg Oral DAILY    lisinopril (PRINIVIL, ZESTRIL) tablet 40 mg  40 mg Oral DAILY    sodium chloride (NS) flush 5-40 mL  5-40 mL IntraVENous Q8H    sodium chloride (NS) flush 5-40 mL  5-40 mL IntraVENous PRN    ondansetron (ZOFRAN) injection 4 mg  4 mg IntraVENous Q4H PRN    aspirin chewable tablet 81 mg  81 mg Oral DAILY    acetaminophen (TYLENOL) tablet 650 mg  650 mg Oral Q4H PRN    enoxaparin (LOVENOX) injection 40 mg  40 mg SubCUTAneous Q24H    insulin lispro (HUMALOG) injection   SubCUTAneous AC&HS    dextrose 40% (GLUTOSE) oral gel 1 Tube  15 g Oral PRN    glucagon (GLUCAGEN) injection 1 mg  1 mg IntraMUSCular PRN    dextrose (D50W) injection syrg 12.5-25 g  25-50 mL IntraVENous PRN       Diet:  DIET NUTRITIONAL SUPPLEMENTS  DIET DIABETIC CONSISTENT CARB    Other Studies (last 24 hours):  No results found. Assessment and Plan:     Hospital Problems as of 1/5/2020 Date Reviewed: 3/3/2017          Codes Class Noted - Resolved POA    PFO (patent foramen ovale) ICD-10-CM: Q21.1  ICD-9-CM: 745.5  12/17/2019 - Present Yes        Arrhythmia ICD-10-CM: I49.9  ICD-9-CM: 427.9  12/15/2019 - Present Yes        Hyperlipemia ICD-10-CM: E78.5  ICD-9-CM: 272.4  12/15/2019 - Present Yes        * (Principal) Acute embolic stroke [facility]) GSQ-12-VH: I63.9  ICD-9-CM: 434.11  12/12/2019 - Present Yes        Hypertension (Chronic) ICD-10-CM: I10  ICD-9-CM: 401.9  Unknown - Present Yes        Type 2 diabetes mellitus (HCC) (Chronic) ICD-10-CM: E11.9  ICD-9-CM: 250.00  Unknown - Present Yes              A/P:    Acute embolic stroke  Continues to have left sided neglect  Evaluated by cardiology and neurology  -MRI head: with acute infarcts in L cerebellar hemisphere, deep frontoparietal white matter, and R paramedian yariel.   Also noted old lacunar infarcts.    -CTA head: No large vessel occlusion   -ISMAEL: 3 mm PFO     Plan:  -Cont statin, ASA  -PT/OT/ST  -Needs outpatient cardiology follow-up for event monitor and PFO closure.      Active Problems:     Hypertension  BP well controlled at this time  Episodes of bradycardia noted     Plan:  -Continue metoprolol; will adjust dose if repeat episodes of bradycardia  -Continue amlodipine     Type 2 diabetes mellitus - cont on Lantus and SSI     Arrhythmia - had brief wide complex tachycardia early in admission, will need event monitor as outpt per cards     Hyperlipemia - statin therapy     PFO (patent foramen ovale) - will need closure as outpatient per cardiology         Signed:  MANN Saldana

## 2020-01-05 NOTE — PROGRESS NOTES
Problem: Patient Education: Go to Patient Education Activity  Goal: Patient/Family Education  Outcome: Progressing Towards Goal     Problem: Patient Education: Go to Patient Education Activity  Goal: Patient/Family Education  Outcome: Progressing Towards Goal     Problem: Patient Education: Go to Patient Education Activity  Goal: Patient/Family Education  Outcome: Progressing Towards Goal     Problem: Diabetes Self-Management  Goal: *Disease process and treatment process  Description  Define diabetes and identify own type of diabetes; list 3 options for treating diabetes. Outcome: Progressing Towards Goal  Goal: *Incorporating nutritional management into lifestyle  Description  Describe effect of type, amount and timing of food on blood glucose; list 3 methods for planning meals. Outcome: Progressing Towards Goal  Goal: *Incorporating physical activity into lifestyle  Description  State effect of exercise on blood glucose levels. Outcome: Progressing Towards Goal  Goal: *Developing strategies to promote health/change behavior  Description  Define the ABC's of diabetes; identify appropriate screenings, schedule and personal plan for screenings. Outcome: Progressing Towards Goal  Goal: *Using medications safely  Description  State effect of diabetes medications on diabetes; name diabetes medication taking, action and side effects. Outcome: Progressing Towards Goal  Goal: *Monitoring blood glucose, interpreting and using results  Description  Identify recommended blood glucose targets  and personal targets. Outcome: Progressing Towards Goal  Goal: *Prevention, detection, treatment of acute complications  Description  List symptoms of hyper- and hypoglycemia; describe how to treat low blood sugar and actions for lowering  high blood glucose level.   Outcome: Progressing Towards Goal  Goal: *Prevention, detection and treatment of chronic complications  Description  Define the natural course of diabetes and describe the relationship of blood glucose levels to long term complications of diabetes.   Outcome: Progressing Towards Goal  Goal: *Developing strategies to address psychosocial issues  Description  Describe feelings about living with diabetes; identify support needed and support network  Outcome: Progressing Towards Goal     Problem: Patient Education: Go to Patient Education Activity  Goal: Patient/Family Education  Outcome: Progressing Towards Goal     Problem: Patient Education: Go to Patient Education Activity  Goal: Patient/Family Education  Outcome: Progressing Towards Goal     Problem: Nutrition Deficit  Goal: *Optimize nutritional status  Outcome: Progressing Towards Goal     Problem: Patient Education: Go to Patient Education Activity  Goal: Patient/Family Education  Outcome: Progressing Towards Goal     Problem: General Medical Care Plan  Goal: *Vital signs within specified parameters  Outcome: Progressing Towards Goal  Goal: *Labs within defined limits  Outcome: Progressing Towards Goal  Goal: *Absence of infection signs and symptoms  Description  Wash hand more often   Outcome: Progressing Towards Goal  Goal: *Optimal pain control at patient's stated goal  Outcome: Progressing Towards Goal  Goal: *Skin integrity maintained  Outcome: Progressing Towards Goal  Goal: *Fluid volume balance  Outcome: Progressing Towards Goal  Goal: *Optimize nutritional status  Outcome: Progressing Towards Goal  Goal: *Anxiety reduced or absent  Outcome: Progressing Towards Goal  Goal: *Progressive mobility and function (eg: ADL's)  Outcome: Progressing Towards Goal     Problem: Patient Education: Go to Patient Education Activity  Goal: Patient/Family Education  Outcome: Progressing Towards Goal     Problem: Patient Education: Go to Patient Education Activity  Goal: Patient/Family Education  Outcome: Progressing Towards Goal     Problem: Patient Education: Go to Patient Education Activity  Goal: Patient/Family Education  Outcome: Progressing Towards Goal     Problem: Patient Education: Go to Patient Education Activity  Goal: Patient/Family Education  Outcome: Progressing Towards Goal     Problem: TIA/CVA Stroke: Day 2 Until Discharge  Goal: Off Pathway (Use only if patient is Off Pathway)  Outcome: Progressing Towards Goal  Goal: Activity/Safety  Outcome: Progressing Towards Goal  Goal: Diagnostic Test/Procedures  Outcome: Progressing Towards Goal  Goal: Nutrition/Diet  Outcome: Progressing Towards Goal  Goal: Discharge Planning  Outcome: Progressing Towards Goal  Goal: Medications  Outcome: Progressing Towards Goal  Goal: Respiratory  Outcome: Progressing Towards Goal  Goal: Treatments/Interventions/Procedures  Outcome: Progressing Towards Goal  Goal: Psychosocial  Outcome: Progressing Towards Goal  Goal: *Verbalizes anxiety and depression are reduced or absent  Outcome: Progressing Towards Goal  Goal: *Absence of aspiration  Outcome: Progressing Towards Goal  Goal: *Absence of deep venous thrombosis signs and symptoms(Stroke Metric)  Outcome: Progressing Towards Goal  Goal: *Optimal pain control at patient's stated goal  Outcome: Progressing Towards Goal  Goal: *Tolerating diet  Outcome: Progressing Towards Goal  Goal: *Ability to perform ADLs and demonstrates progressive mobility and function  Outcome: Progressing Towards Goal  Goal: *Stroke education continued(Stroke Metric)  Outcome: Progressing Towards Goal     Problem: Ischemic Stroke: Discharge Outcomes  Goal: *Verbalizes anxiety and depression are reduced or absent  Outcome: Progressing Towards Goal  Goal: *Verbalize understanding of risk factor modification(Stroke Metric)  Outcome: Progressing Towards Goal  Goal: *Hemodynamically stable  Outcome: Progressing Towards Goal  Goal: *Absence of aspiration pneumonia  Outcome: Progressing Towards Goal  Goal: *Aware of needed dietary changes  Outcome: Progressing Towards Goal  Goal: *Verbalize understanding of prescribed medications including anti-coagulants, anti-lipid, and/or anti-platelets(Stroke Metric)  Outcome: Progressing Towards Goal  Goal: *Tolerating diet  Outcome: Progressing Towards Goal  Goal: *Aware of follow-up diagnostics related to anticoagulants  Outcome: Progressing Towards Goal  Goal: *Ability to perform ADLs and demonstrates progressive mobility and function  Outcome: Progressing Towards Goal  Goal: *Absence of DVT(Stroke Metric)  Outcome: Progressing Towards Goal  Goal: *Absence of aspiration  Outcome: Progressing Towards Goal  Goal: *Optimal pain control at patient's stated goal  Outcome: Progressing Towards Goal  Goal: *Home safety concerns addressed  Outcome: Progressing Towards Goal  Goal: *Describes available resources and support systems  Outcome: Progressing Towards Goal  Goal: *Verbalizes understanding of activation of EMS(911) for stroke symptoms(Stroke Metric)  Outcome: Progressing Towards Goal  Goal: *Understands and describes signs and symptoms to report to providers(Stroke Metric)  Outcome: Progressing Towards Goal  Goal: *Neurolgocially stable (absence of additional neurological deficits)  Outcome: Progressing Towards Goal  Goal: *Verbalizes importance of follow-up with primary care physician(Stroke Metric)  Outcome: Progressing Towards Goal  Goal: *Smoking cessation discussed,if applicable(Stroke Metric)  Outcome: Progressing Towards Goal  Goal: *Depression screening completed(Stroke Metric)  Outcome: Progressing Towards Goal

## 2020-01-06 LAB
ANION GAP SERPL CALC-SCNC: 4 MMOL/L (ref 7–16)
BASOPHILS # BLD: 0 K/UL (ref 0–0.2)
BASOPHILS NFR BLD: 1 % (ref 0–2)
BUN SERPL-MCNC: 17 MG/DL (ref 6–23)
CALCIUM SERPL-MCNC: 10.1 MG/DL (ref 8.3–10.4)
CHLORIDE SERPL-SCNC: 110 MMOL/L (ref 98–107)
CO2 SERPL-SCNC: 29 MMOL/L (ref 21–32)
CREAT SERPL-MCNC: 1.18 MG/DL (ref 0.8–1.5)
DIFFERENTIAL METHOD BLD: ABNORMAL
EOSINOPHIL # BLD: 0.1 K/UL (ref 0–0.8)
EOSINOPHIL NFR BLD: 3 % (ref 0.5–7.8)
ERYTHROCYTE [DISTWIDTH] IN BLOOD BY AUTOMATED COUNT: 16.9 % (ref 11.9–14.6)
GLUCOSE BLD STRIP.AUTO-MCNC: 103 MG/DL (ref 65–100)
GLUCOSE BLD STRIP.AUTO-MCNC: 112 MG/DL (ref 65–100)
GLUCOSE BLD STRIP.AUTO-MCNC: 121 MG/DL (ref 65–100)
GLUCOSE BLD STRIP.AUTO-MCNC: 93 MG/DL (ref 65–100)
GLUCOSE SERPL-MCNC: 83 MG/DL (ref 65–100)
HCT VFR BLD AUTO: 38.1 % (ref 41.1–50.3)
HGB BLD-MCNC: 11.8 G/DL (ref 13.6–17.2)
IMM GRANULOCYTES # BLD AUTO: 0 K/UL (ref 0–0.5)
IMM GRANULOCYTES NFR BLD AUTO: 0 % (ref 0–5)
LYMPHOCYTES # BLD: 1.6 K/UL (ref 0.5–4.6)
LYMPHOCYTES NFR BLD: 41 % (ref 13–44)
MCH RBC QN AUTO: 25.2 PG (ref 26.1–32.9)
MCHC RBC AUTO-ENTMCNC: 31 G/DL (ref 31.4–35)
MCV RBC AUTO: 81.2 FL (ref 79.6–97.8)
MONOCYTES # BLD: 0.4 K/UL (ref 0.1–1.3)
MONOCYTES NFR BLD: 9 % (ref 4–12)
NEUTS SEG # BLD: 1.8 K/UL (ref 1.7–8.2)
NEUTS SEG NFR BLD: 46 % (ref 43–78)
NRBC # BLD: 0 K/UL (ref 0–0.2)
PLATELET # BLD AUTO: 282 K/UL (ref 150–450)
PMV BLD AUTO: 11.1 FL (ref 9.4–12.3)
POTASSIUM SERPL-SCNC: 3.5 MMOL/L (ref 3.5–5.1)
RBC # BLD AUTO: 4.69 M/UL (ref 4.23–5.6)
SODIUM SERPL-SCNC: 143 MMOL/L (ref 136–145)
WBC # BLD AUTO: 3.9 K/UL (ref 4.3–11.1)

## 2020-01-06 PROCEDURE — 85025 COMPLETE CBC W/AUTO DIFF WBC: CPT

## 2020-01-06 PROCEDURE — 74011636637 HC RX REV CODE- 636/637: Performed by: INTERNAL MEDICINE

## 2020-01-06 PROCEDURE — 74011250637 HC RX REV CODE- 250/637: Performed by: INTERNAL MEDICINE

## 2020-01-06 PROCEDURE — 36415 COLL VENOUS BLD VENIPUNCTURE: CPT

## 2020-01-06 PROCEDURE — 74011250637 HC RX REV CODE- 250/637: Performed by: PHYSICIAN ASSISTANT

## 2020-01-06 PROCEDURE — 82962 GLUCOSE BLOOD TEST: CPT

## 2020-01-06 PROCEDURE — 74011250636 HC RX REV CODE- 250/636: Performed by: INTERNAL MEDICINE

## 2020-01-06 PROCEDURE — 92507 TX SP LANG VOICE COMM INDIV: CPT

## 2020-01-06 PROCEDURE — 74011250637 HC RX REV CODE- 250/637: Performed by: HOSPITALIST

## 2020-01-06 PROCEDURE — 97530 THERAPEUTIC ACTIVITIES: CPT

## 2020-01-06 PROCEDURE — 80048 BASIC METABOLIC PNL TOTAL CA: CPT

## 2020-01-06 PROCEDURE — 65660000000 HC RM CCU STEPDOWN

## 2020-01-06 PROCEDURE — 65270000029 HC RM PRIVATE

## 2020-01-06 RX ORDER — POTASSIUM CHLORIDE 20 MEQ/1
40 TABLET, EXTENDED RELEASE ORAL
Status: COMPLETED | OUTPATIENT
Start: 2020-01-06 | End: 2020-01-06

## 2020-01-06 RX ORDER — LANOLIN ALCOHOL/MO/W.PET/CERES
400 CREAM (GRAM) TOPICAL 2 TIMES DAILY
Status: DISCONTINUED | OUTPATIENT
Start: 2020-01-06 | End: 2020-01-07

## 2020-01-06 RX ADMIN — PANTOPRAZOLE SODIUM 40 MG: 40 TABLET, DELAYED RELEASE ORAL at 06:14

## 2020-01-06 RX ADMIN — AMLODIPINE BESYLATE 10 MG: 10 TABLET ORAL at 08:16

## 2020-01-06 RX ADMIN — INSULIN GLARGINE 10 UNITS: 100 INJECTION, SOLUTION SUBCUTANEOUS at 21:27

## 2020-01-06 RX ADMIN — Medication 400 MG: at 17:09

## 2020-01-06 RX ADMIN — POTASSIUM CHLORIDE 40 MEQ: 1500 TABLET, EXTENDED RELEASE ORAL at 09:41

## 2020-01-06 RX ADMIN — Medication 400 MG: at 09:41

## 2020-01-06 RX ADMIN — ENOXAPARIN SODIUM 40 MG: 40 INJECTION SUBCUTANEOUS at 14:04

## 2020-01-06 RX ADMIN — Medication 5 ML: at 21:27

## 2020-01-06 RX ADMIN — Medication 5 ML: at 14:04

## 2020-01-06 RX ADMIN — ASPIRIN 81 MG 81 MG: 81 TABLET ORAL at 08:16

## 2020-01-06 RX ADMIN — Medication 5 ML: at 08:17

## 2020-01-06 RX ADMIN — GUAIFENESIN 100 MG: 100 SOLUTION ORAL at 17:08

## 2020-01-06 RX ADMIN — METOPROLOL TARTRATE 50 MG: 50 TABLET, FILM COATED ORAL at 08:16

## 2020-01-06 RX ADMIN — ATORVASTATIN CALCIUM 80 MG: 80 TABLET, FILM COATED ORAL at 21:23

## 2020-01-06 RX ADMIN — LISINOPRIL 40 MG: 20 TABLET ORAL at 08:16

## 2020-01-06 RX ADMIN — ACETAMINOPHEN 650 MG: 325 TABLET, FILM COATED ORAL at 17:08

## 2020-01-06 NOTE — PROGRESS NOTES
Hospitalist Progress Note     Admit Date:  2019  9:07 AM   Name:  Scarlet Tellez   Age:  53 y.o.  :  1975   MRN:  [id number]   PCP:  Sonam Resendiz MD      Subjective:     52 y/o smoker with DM, HTN, who presented to ER  with L leg and arm weakness, L facial droop, dysarthria. First noted L leg weakness late night  when he woke up to go to the bathroom. He was normal when he went to bed around 1030. EMS called. Noted to have slurred speech and L facial droop as well. Code S called in ER around 9am.  MRI with acute infarcts in L cerebellar hemisphere, deep frontoparietal white matter, and R paramedian yariel. Also noted old lacunar infarcts. No large vessel occlusion on CTA, but some stenosis noted. He was started on asa, statins. Neurology consulted. An ECHO showed PFO. Plan for cardiology referral and evaluate for closure as outpatient. PT/OT suggested inpatient rehabilitation. The patient is uninsured and the only option at this moment is [facility]. Today, L weakness unchanged, no ac events, occasional mild cough    Objective:     Patient Vitals for the past 24 hrs:   Temp Pulse Resp BP SpO2   20 1600 98.2 °F (36.8 °C) (!) 57 18 110/68 96 %   20 1200 97.7 °F (36.5 °C) (!) 58 18 103/64 97 %   20 0800 98.5 °F (36.9 °C) 61 18 108/69 98 %   20 0400 98 °F (36.7 °C) 60 20 122/83 96 %   20 0000 97.9 °F (36.6 °C) (!) 55 20 126/83 96 %   20 2000 97.5 °F (36.4 °C) (!) 50 20 114/72 97 %   20     97 %     Oxygen Therapy  O2 Sat (%): 96 % (20 1600)  Pulse via Oximetry: 56 beats per minute (20)  O2 Device: Room air (20)  No intake or output data in the 24 hours ending 20 1625      REVIEW OF SYSTEMS: Comprehensive ROS performed and negative except as stated in HPI. Physical Examination:  General:          Well nourished. Alert and oriented. Mild distress  CV:                  RRR. No murmur, rub, or gallop. Lungs:             Clear to auscultation bilaterally. No wheezing, rhonchi, or rales  Extremities:     Warm and dry. No cyanosis or edema. Neurologic:      CN II-XII intact except for mild L facial droop. Sensation intact. PERRLA.  dysarthria. No                          confusion. STR o/5 LUE and LLE. Dysarthria noted. Skin:                No rashes or jaundice. No wounds. Psych:             Appears depressed    Data Review:  I have reviewed all labs, meds, telemetry events, and studies from the last 24 hours. Recent Results (from the past 24 hour(s))   GLUCOSE, POC    Collection Time: 01/05/20  8:33 PM   Result Value Ref Range    Glucose (POC) 111 (H) 65 - 100 mg/dL   CBC WITH AUTOMATED DIFF    Collection Time: 01/06/20  4:22 AM   Result Value Ref Range    WBC 3.9 (L) 4.3 - 11.1 K/uL    RBC 4.69 4.23 - 5.6 M/uL    HGB 11.8 (L) 13.6 - 17.2 g/dL    HCT 38.1 (L) 41.1 - 50.3 %    MCV 81.2 79.6 - 97.8 FL    MCH 25.2 (L) 26.1 - 32.9 PG    MCHC 31.0 (L) 31.4 - 35.0 g/dL    RDW 16.9 (H) 11.9 - 14.6 %    PLATELET 082 443 - 528 K/uL    MPV 11.1 9.4 - 12.3 FL    ABSOLUTE NRBC 0.00 0.0 - 0.2 K/uL    DF AUTOMATED      NEUTROPHILS 46 43 - 78 %    LYMPHOCYTES 41 13 - 44 %    MONOCYTES 9 4.0 - 12.0 %    EOSINOPHILS 3 0.5 - 7.8 %    BASOPHILS 1 0.0 - 2.0 %    IMMATURE GRANULOCYTES 0 0.0 - 5.0 %    ABS. NEUTROPHILS 1.8 1.7 - 8.2 K/UL    ABS. LYMPHOCYTES 1.6 0.5 - 4.6 K/UL    ABS. MONOCYTES 0.4 0.1 - 1.3 K/UL    ABS. EOSINOPHILS 0.1 0.0 - 0.8 K/UL    ABS. BASOPHILS 0.0 0.0 - 0.2 K/UL    ABS. IMM.  GRANS. 0.0 0.0 - 0.5 K/UL   METABOLIC PANEL, BASIC    Collection Time: 01/06/20  4:22 AM   Result Value Ref Range    Sodium 143 136 - 145 mmol/L    Potassium 3.5 3.5 - 5.1 mmol/L    Chloride 110 (H) 98 - 107 mmol/L    CO2 29 21 - 32 mmol/L    Anion gap 4 (L) 7 - 16 mmol/L    Glucose 83 65 - 100 mg/dL    BUN 17 6 - 23 MG/DL    Creatinine 1.18 0.8 - 1.5 MG/DL    GFR est AA >60 >60 ml/min/1.73m2    GFR est non-AA >60 >60 ml/min/1.73m2    Calcium 10.1 8.3 - 10.4 MG/DL   GLUCOSE, POC    Collection Time: 01/06/20  7:52 AM   Result Value Ref Range    Glucose (POC) 121 (H) 65 - 100 mg/dL   GLUCOSE, POC    Collection Time: 01/06/20 11:29 AM   Result Value Ref Range    Glucose (POC) 112 (H) 65 - 100 mg/dL   GLUCOSE, POC    Collection Time: 01/06/20  4:04 PM   Result Value Ref Range    Glucose (POC) 93 65 - 100 mg/dL        All Micro Results     None          Current Meds:  Current Facility-Administered Medications   Medication Dose Route Frequency    magnesium oxide (MAG-OX) tablet 400 mg  400 mg Oral BID    metoprolol tartrate (LOPRESSOR) tablet 50 mg  50 mg Oral BID    polyethylene glycol (MIRALAX) packet 17 g  17 g Oral DAILY PRN    insulin glargine (LANTUS) injection 10 Units  10 Units SubCUTAneous QHS    pantoprazole (PROTONIX) tablet 40 mg  40 mg Oral ACB    guaiFENesin (ROBITUSSIN) 100 mg/5 mL oral liquid 100 mg  100 mg Oral Q4H PRN    hydrALAZINE (APRESOLINE) 20 mg/mL injection 20 mg  20 mg IntraVENous Q4H PRN    atorvastatin (LIPITOR) tablet 80 mg  80 mg Oral QHS    amLODIPine (NORVASC) tablet 10 mg  10 mg Oral DAILY    lisinopril (PRINIVIL, ZESTRIL) tablet 40 mg  40 mg Oral DAILY    sodium chloride (NS) flush 5-40 mL  5-40 mL IntraVENous Q8H    sodium chloride (NS) flush 5-40 mL  5-40 mL IntraVENous PRN    ondansetron (ZOFRAN) injection 4 mg  4 mg IntraVENous Q4H PRN    aspirin chewable tablet 81 mg  81 mg Oral DAILY    acetaminophen (TYLENOL) tablet 650 mg  650 mg Oral Q4H PRN    enoxaparin (LOVENOX) injection 40 mg  40 mg SubCUTAneous Q24H    insulin lispro (HUMALOG) injection   SubCUTAneous AC&HS    dextrose 40% (GLUTOSE) oral gel 1 Tube  15 g Oral PRN    glucagon (GLUCAGEN) injection 1 mg  1 mg IntraMUSCular PRN    dextrose (D50W) injection syrg 12.5-25 g  25-50 mL IntraVENous PRN       Diet:  DIET NUTRITIONAL SUPPLEMENTS  DIET DIABETIC CONSISTENT CARB    Other Studies (last 24 hours):  No results found.    Assessment and Plan:     Hospital Problems as of 1/6/2020 Date Reviewed: 3/3/2017          Codes Class Noted - Resolved POA    PFO (patent foramen ovale) ICD-10-CM: Q21.1  ICD-9-CM: 745.5  12/17/2019 - Present Yes        Arrhythmia ICD-10-CM: I49.9  ICD-9-CM: 427.9  12/15/2019 - Present Yes        Hyperlipemia ICD-10-CM: E78.5  ICD-9-CM: 272.4  12/15/2019 - Present Yes        * (Principal) Acute embolic stroke [facility]) [id number]: I63.9  ICD-9-CM: 434.11  12/12/2019 - Present Yes        Hypertension (Chronic) ICD-10-CM: I10  ICD-9-CM: 401.9  Unknown - Present Yes        Type 2 diabetes mellitus (HCC) (Chronic) ICD-10-CM: E11.9  ICD-9-CM: 250.00  Unknown - Present Yes              A/P:    Acute embolic stroke  Continues to have left sided neglect  Evaluated by cardiology and neurology  -MRI head: with acute infarcts in L cerebellar hemisphere, deep frontoparietal white matter, and R paramedian yariel. Also noted old lacunar infarcts.    -CTA head: No large vessel occlusion   -ISMAEL: 3 mm PFO     Plan:  -Cont statin, ASA  -PT/OT/ST  -Needs outpatient cardiology follow-up for event monitor and PFO closure.      Active Problems:     Hypertension  BP well controlled at this time  Episodes of bradycardia noted     Plan:  -Continue metoprolol; will adjust dose if repeat episodes of bradycardia  -Continue amlodipine     Type 2 diabetes mellitus - cont on Lantus and SSI     Arrhythmia - had brief wide complex tachycardia early in admission, will need event monitor as outpt per cards     Hyperlipemia - statin therapy     PFO (patent foramen ovale) - will need closure as outpatient per cardiology       Dispo; TBD, await medicaid approval and placement

## 2020-01-06 NOTE — PROGRESS NOTES
01/06/20 0700   NIH Stroke Scale   Interval Other (comment)   LOC 0   LOC Questions 0   LOC Commands 0   Best Gaze 0   Visual 0   Facial Palsy 2   Motor Right Arm 0   Motor Left Arm 4   Motor Right Leg 0   Motor Left Leg 4   Limb Ataxia 0   Sensory 0   Best Language 0   Dysarthria 1   Extinction and Inattention 0   Total 11   Dual NIH with Bryce Be

## 2020-01-06 NOTE — PROGRESS NOTES
Nutrition follow-up:     Assessment:   Diet: DIET NUTRITIONAL SUPPLEMENTS All Meals; Glucerna Shake  DIET DIABETIC CONSISTENT CARB Mechanical Soft; AHA-LOW-CHOL FAT    Food/Nutrition Patient History:    Pt is 52 yo male who presented with weakness, facial drooping and slurred speech. He has history of CVA, DM type 2, HTN, pancreatitis, GERD and a smoker. Diet progressed per SLP 12/17. Visit with patient and wife at bedside. Pt up to chair. He continues with speech barriers but was able to ask if he could have a sandwich. He is self feeding after being set up with meal tray. Pt and wife indicate 50% or greater of meals consumed, utilizing majority of glucerna. Last recorded BM 1/4, soft. Results for Nick Headley (MRN [id number]) as of 1/6/2020 15:44   1/5/2020 07:05 1/5/2020 10:58 1/5/2020 15:46 1/5/2020 20:33 1/6/2020 04:22 1/6/2020 07:52 1/6/2020 11:29   GLUCOSE,FAST - POC 92 169 (H) 84 111 (H)  121 (H) 112 (H)     Anthropometrics:  Height: 5' 6\" (167.6 cm), Weight: 95.3 kg (210 lb), source not specified, Body mass index is 33.89 kg/m². BMI class of obese. Last 3 Recorded Weights in this Encounter    12/12/19 0906   Weight: 95.3 kg (210 lb)   Macronutrient needs: MSJ (using CBW (Current body weight) unknown 95.3 kg)  EER: 2130 kcal/day (22 kcals/kg )  EPR: 106 g/day (1.1 g/kg ) (20%)     Intake/Comparative Standards: One meal recorded at 55% since last visit. Recall per pt wife and current RN potentially meets % of kcal and protein needs. Nutrition Intervention:  Meals and Snacks: Continue with current diet. Progressions per SLP. Preferences expressed per pt and wife updated in nutrition services software and discussed with [employer] [place], [address] [place] [address] Associate. Commercial Beverages and Supplements: Continue Glucerna TID in vanilla. Blood glucose remains appropriate for continued use with report of fluctuating intake. Coordination of Care: Discussed with Nellie Walker RN.     Discharge Nutrition Plan: At this juncture would anticipate he will continue to require oral supplements due to ongoing inconsistent po intake.      Sissy Varela, [address], [address]

## 2020-01-06 NOTE — PROGRESS NOTES
Neurolinguistic Goals:  LTG: Patient will increase neuro-linguistic abilities to increase safety and awareness in functional living environment   STG: Patient will participate in speech/language/cognitive evaluation.  MET 12/17/19  STG: Patient will solve mathematical problems with 80%accuracy given minimal cueing   STG: Patient will name 10 items to concrete category in 1 minute given minimal cueing  STG: Patient will participate in verbal reasoning tasks with 80% accuracy given minimal cueing  STG: Patient will complete mental manipulation tasks with 80% accuracy given minimal cueing  STG: Patient will complete working memory/attention tasks with 80% accuracy given minimal cueing  STG: Patient will recall relevant verbal information with 80% accuracy with minimal assistance  STG: Patient will utilize memory compensatory strategies to improve recall of functional list/message with 90%accuracy given minimal assistance  STG: Patient will participate in ongoing cognitive linguistic evaluation for therapeutic assessment      Dysarthria Goals:  LTG: Patient will develop functional and intelligible speech and utilize compensatory strategies to improve communication across environments  STG: Patient will fill in carrier phrase/complete automatic speech tasks with 95% accuracy given minimal cueing  STG: Patient will repeat phrases, sentences with 85% accuracy given minimal cueing  STG: Patient will utilize compensatory strategies across tasks with 90% accuracy given minimal cueing    SPEECH LANGUAGE PATHOLOGY: SPEECH-LANGUAGE/COGNITION: Daily Note 8    NAME/AGE/GENDER: Fernanda Vieyra is a 53 y.o. male  DATE: 1/6/2020  PRIMARY DIAGNOSIS: Acute ischemic stroke ([facility] [address] [I63.9]  Cerebrovascular accident (CVA) due to embolism ([facility] [address] [I63.9]      ICD-10: Treatment Diagnosis: R47.1 Dysarthria and Anarthria  R41.841 Cognitive-Communication Deficit    INTERDISCIPLINARY COLLABORATION: n/a  PRECAUTIONS/ALLERGIES: Patient has no known allergies. SUBJECTIVE   Reclined in chair. Wife present. Problem List:  (Impairments causing functional limitations):  1. Cognitive linguistic deficits  2. Dysarthria     Orientation:   Person  Place  Time  Situation     Pain: Pain Scale 1: Numeric (0 - 10)  Pain Intensity 1: 0        OBJECTIVE   The following treatment tasks were completed: Word deduction (3 words): 5/8 independently; 7/8 with min cues  Answering questions related to weekly forecast: 6/7   Dysarthria strategies recall: 100%  Dysarthria: moderate cueing at phrase/sentence level         ASSESSMENT   Patient continues to demonstrate impaired speech intelligibility and reasoning abilities. Able to recall dysarthria strategies, however decreased carryover at phrase level requiring moderate cueing to utilize strategies to improve intelligibility when expressing wants/needs. Patient noted to independently utilize pacing techniques to improve rate. Patient will benefit from ongoing skilled intervention to improve speech intelligibility and cognitive linguistic function during this hospitalization and at next level of care. Tool Used: MODIFIED BRITT SCALE (mRS)   Score   No Symptoms  [] 0   No significant disability despite symptoms; able to carry out all usual duties and activities  [] 1   Slight disability; unable to carry out all previous activities but able to look after own affairs without assistance.    [] 2   Moderate disability; requiring some help but able to walk without assistance  [] 3   Moderately severe disability; unable to walk without assistance and unable to attend to own bodily needs without assistance  [] 4   Severe disability; bedridden, incontinent, and requiring constant nursing care and attention  [] 5      Score:  Initial: 2 Most Recent: 2  (Date 01/06/20 )   Interpretation of Tool: The Modified [place] Scale is a 7-point scaled used to quantify level of disability as it relates to a patient's functional abilities. PLAN    FREQUENCY/DURATION: Continue to follow patient 3 times a week for duration of hospital stay to address above goals. - Recommendations for next treatment session: Next treatment will address cognitive linguistic and dysarthria treatment   REHABILITATION POTENTIAL FOR STATED GOALS: Excellent           RECOMMENDATIONS   STRATEGIES:   · Dysarthria strategies  · Memory strategies      EDUCATION:  · Recommendations discussed with patient, nursing, patient's wife      RECOMMENDATIONS for CONTINUED SPEECH THERAPY: YES: Anticipate need for ongoing speech therapy during this hospitalization and at next level of care. Compliance with Program/Exercises: Will assess as treatment progresses  Continuation of Skilled Services/Medical Necessity:   Patient is expected to demonstrate progress in cognitive ability and dysarthria to decrease assistance required communication, increase independence with activities of daily living and increase communication with family/caregivers.  Patient continues to require skilled intervention due to dysarthria and cognitive linguistic deficits.      SAFETY:  After treatment position/precautions:  · Up in chair  · RN notified  · Call light within reach    Total Treatment Duration:   Time In: 1440  Time Out: Layla Jackson  [address] [id number] [facility]

## 2020-01-06 NOTE — PROGRESS NOTES
Problem: Mobility Impaired (Adult and Pediatric)  Goal: *Acute Goals and Plan of Care  Description  Goals adjusted 12/31/19  STG:  (1.) Mr. Jackie Anderson will move from supine to sit and sit to supine , scoot up and down and roll side to side with MINIMAL ASSIST within 3 treatment day(s). (2.) Mr. Jackie Anderson will transfer from bed to chair and chair to bed with MODERATE ASSIST using the least restrictive device within 3 treatment day(s). (3.) Mr. Jackie Anderson will perform standing static and dynamic balance activities x 10 minutes with MODERATE ASSISTx1 to improve safety within 3 treatment day(s). LTG:  (1.) Mr. Jackie Anderson will move from supine to sit and sit to supine , scoot up and down and roll side to side with CONTACT GUARD ASSIST within 7 treatment day(s). (2.) Mr. Jackie Anderson will transfer from bed to chair and chair to bed with MINIMAL ASSIST using the least restrictive device within 7 treatment day(s). (3.) Mr. Jackie Anderson will perform standing static and dynamic balance activities x 20 minutes with MINIMAL ASSIST to improve safety within 7 treatment day(s). (4.) Mr. Jackie Anderson will perform bilateral lower extremity exercises x 15 min for HEP with SUPERVISION to improve strength, endurance, and functional mobility within 7 treatment day(s).      PHYSICAL THERAPY: Daily Note and PM 1/6/2020  INPATIENT: PT Visit Days : 4  Payor: MEDICAID PENDING / Plan: Garrick Yoon PENDING / Product Type: Medicaid /       NAME/AGE/GENDER: Priti Kelyl is a 53 y.o. male   PRIMARY DIAGNOSIS: Acute ischemic stroke ([address] [I63.9]  Cerebrovascular accident (CVA) due to embolism ([address] [X98.2] Acute embolic stroke ([address] Acute embolic stroke ([address]       ICD-10: Treatment Diagnosis:   · Other lack of cordination (R27.8)  · Difficulty in walking, Not elsewhere classified (R26.2)  · Other abnormalities of gait and mobility (R26.89)  · Unspecified Lack of Coordination (R27.9)  · Hemiplegia and hemiparesis following cerebral infarction affecting   · left non-dominant side (P97.103)   Precaution/Allergies:  Patient has no known allergies. ASSESSMENT:     Mr. Karlos Alvarenga is a 53year old admitted with acute CVA with left hemiparesis and left inattention. Patient is supine on contact and agreeable to PT, he is very motivated and works hard with therapy. He rolls in bed with no assist rolling to the left side and mod assist rolling to the right side for a brief change. He then performs supine to sit with min A x2 and demonstrates good static sitting balance at the edge of the bed once positioned well. The patient performs sit to stand with mod A x2 and demonstrates poor standing balance with constant support from therapists. The patient performs a stand pivot transfer with Mod A x2 and sits in the recliner chair. He scoots back in the chair with mod A x1. He is then positioned for comfort with the L UE positioned in abduction and posey on. Good session with increased performance and good command following. Overall the patient is making good progress toward therapy goals as indicated by increased activity tolerance and improved sitting balance. Will continue skilled PT treatment as patient is still below functional baseline. This section established at most recent assessment   PROBLEM LIST (Impairments causing functional limitations):  1. Decreased Strength  2. Decreased ADL/Functional Activities  3. Decreased Transfer Abilities  4. Decreased Ambulation Ability/Technique  5. Decreased Balance  6. Increased Pain  7. Decreased Activity Tolerance  8. Increased Fatigue  9. Decreased Flexibility/Joint Mobility   INTERVENTIONS PLANNED: (Benefits and precautions of physical therapy have been discussed with the patient.)  1. Balance Exercise  2. Bed Mobility  3. Family Education  4. Gait Training  5. Home Exercise Program (HEP)  6. Manual Therapy  7. Neuromuscular Re-education/Strengthening  8. Range of Motion (ROM)  9.  Therapeutic Activites  10. Therapeutic Exercise/Strengthening  11. Transfer Training     TREATMENT PLAN: Frequency/Duration: 3 times a week for duration of hospital stay  Rehabilitation Potential For Stated Goals: Good     REHAB RECOMMENDATIONS (at time of discharge pending progress):    Placement: It is my opinion, based on this patient's performance to date, that Mr. Joanne Beach may benefit from intensive therapy at an [address] [place] [address] after discharge due to a probable need for 24 hour rehab nursing, a probable need for multiple therapy disciplines, and potential to make ongoing and sustainable functional improvement that is of practical value. .  Equipment:    TBD pending progress with therapy. HISTORY:   History of Present Injury/Illness (Reason for Referral):  Per H&P: \"Pt is a 52 y/o smoker with DM, HTN, who presented to ER with L leg and arm weakness, L facial droop, dysarthria. First noted L leg weakness late night 12/11 when he woke up to go to the bathroom. He was normal when he went to bed around 1030. Woke up this morning and had persistent weakness L leg and also now noted in L arm. EMS called. Noted to have slurred speech and L facial droop as well. Code S called in ER around 9am.  MRI with acute infarcts in L cerebellar hemisphere, deep frontoparietal white matter, and R paramedian yariel. Also noted old lacunar infarcts. No large vessel occlusion on CTA, but some stenosis noted. No hx afib, TIA, CVA. No CP, palpitations, SOB. \"  Past Medical History/Comorbidities:   Mr. Joanne Beach  has a past medical history of Acute ischemic stroke ([address] (12/12/2019), Acute pancreatitis (11/19/2014), Cerebrovascular accident (CVA) due to embolism ([address] (12/12/2019), Diabetes ([address] (2002), Diabetes ([address] Diabetes mellitus, and Hypertension.  He also has no past medical history of Arthritis, Asthma, Autoimmune disease ([address] CAD (coronary artery disease), Cancer ([address] Chronic kidney disease, COPD, Dementia, Dementia ([facility] [address] Heart failure ([facility] [address] Ill-defined condition, Infectious disease, Liver disease, Other ill-defined conditions(799.89), Psychiatric disorder, PUD (peptic ulcer disease), Seizures ([facility] [address] or Sleep disorder. Mr. Luc Gómez  has a past surgical history that includes hx hernia repair and hx orthopaedic. Social History/Living Environment:   Home Environment: Apartment  # Steps to Enter: 12  Rails to Enter: Yes  [employer] : Bilateral  One/Two Story Residence: One story  Living Alone: No  Support Systems: Spouse/Significant Other/Partner  Patient Expects to be Discharged to[de-identified] Unknown  Current DME Used/Available at Home: None  Tub or Shower Type: Tub/Shower combination  Prior Level of Function/Work/Activity:  Independent, lives with wife in 2nd story 1 level apartment. No recent falls. Number of Personal Factors/Comorbidities that affect the Plan of Care: 1-2: MODERATE COMPLEXITY   EXAMINATION:   Most Recent Physical Functioning:   Gross Assessment:                  Posture:     Balance:  Sitting: Impaired  Sitting - Static: Fair (occasional)((+))  Sitting - Dynamic: Fair (occasional)  Standing: Impaired  Standing - Static: Poor  Standing - Dynamic : Poor Bed Mobility:  Supine to Sit: Minimum assistance;Assist x2  Scooting: Moderate assistance  Wheelchair Mobility:     Transfers:  Sit to Stand: Moderate assistance  Stand to Sit: Moderate assistance  Stand Pivot Transfers: Moderate assistance;Assist x2  Interventions: Safety awareness training;Verbal cues; Visual cues; Tactile cues  Gait:            Body Structures Involved:  1. Nerves  2. Voice/Speech  3. Bones  4. Joints  5. Muscles Body Functions Affected:  1. Mental  2. Sensory/Pain  3. Neuromusculoskeletal  4. Movement Related Activities and Participation Affected:  1. General Tasks and Demands  2. Mobility  3. Self Care  4.  Interpersonal Interactions and Relationships   Number of elements that affect the Plan of Care: 4+: HIGH COMPLEXITY CLINICAL PRESENTATION:   Presentation: Evolving clinical presentation with changing clinical characteristics: MODERATE COMPLEXITY   CLINICAL DECISION MAKIN [facility] Mobility Inpatient Short Form  How much difficulty does the patient currently have. .. Unable A Lot A Little None   1. Turning over in bed (including adjusting bedclothes, sheets and blankets)? [] 1   [] 2   [x] 3   [] 4   2. Sitting down on and standing up from a chair with arms ( e.g., wheelchair, bedside commode, etc.)   [] 1   [x] 2   [] 3   [] 4   3. Moving from lying on back to sitting on the side of the bed? [] 1   [x] 2   [] 3   [] 4   How much help from another person does the patient currently need. .. Total A Lot A Little None   4. Moving to and from a bed to a chair (including a wheelchair)? [] 1   [x] 2   [] 3   [] 4   5. Need to walk in hospital room? [] 1   [x] 2   [] 3   [] 4   6. Climbing 3-5 steps with a railing? [] 1   [x] 2   [] 3   [] 4   © , Trustees of [address] [place] [address], under license to [email or web address]. All rights reserved      Score:  Initial: 13 Most Recent: 13 (Date:19 )    Interpretation of Tool:  Represents activities that are increasingly more difficult (i.e. Bed mobility, Transfers, Gait). Medical Necessity:     · Patient is expected to demonstrate progress in   · strength, range of motion, balance, coordination, and functional technique  ·  to   · increase independence with all mobility. · .  Reason for Services/Other Comments:  · Patient continues to require skilled intervention due to   · medical complications and mobility deficits which impact his level of function, safety, and independence as indicated above.    · .   Use of outcome tool(s) and clinical judgement create a POC that gives a: Questionable prediction of patient's progress: MODERATE COMPLEXITY        TREATMENT:   (In addition to Assessment/Re-Assessment sessions the following treatments were rendered)   Pre-treatment Symptoms/Complaints:  \"yes\"  Pain: Initial:   Pain Intensity 1: 0  Post Session:  none     REASSESSMENT    Therapeutic Activity: (    23 min): Therapeutic activities including bed mobility, rolling, scooting, sit <> stand transfer training  And stand pivot to the bed to improve mobility, strength, balance, and coordination. Required moderate cues with moderate physical assist x2   to promote coordination of bilateral, upper extremity(s), lower extremity(s) and promote motor control of bilateral, upper extremity(s), lower extremity(s). Braces/Orthotics/Lines/Etc:   · O2 Device: Room Air   Treatment/Session Assessment:    · Response to Treatment:  see above. Works very hard with therapy. · Interdisciplinary Collaboration:   o Physical Therapy Assistant  o Registered Nurse  · After treatment position/precautions:   o Up in chair  o Bed alarm/tab alert on  o Bed/Chair-wheels locked  o Bed in low position  o Call light within reach  o RN notified  o Family at bedside   · Compliance with Program/Exercises: Compliant all of the time  · Recommendations/Intent for next treatment session: \"Next visit will focus on advancements to more challenging activities and reduction in assistance provided\".     Total Treatment Duration:  PT Patient Time In/Time Out  Time In: 1322  Time Out: Juan Luis Cantu PTA

## 2020-01-06 NOTE — PROGRESS NOTES
01/05/20 1901   NIH Stroke Scale   Interval Other (comment)   LOC 0   LOC Questions 0   LOC Commands 0   Best Gaze 0   Visual 0   Facial Palsy 2   Motor Right Arm 0   Motor Left Arm 4   Motor Right Leg 0   Motor Left Leg 4   Limb Ataxia 0   Sensory 0   Best Language 0   Dysarthria 1   Extinction and Inattention 0   Total 11

## 2020-01-07 LAB
GLUCOSE BLD STRIP.AUTO-MCNC: 110 MG/DL (ref 65–100)
GLUCOSE BLD STRIP.AUTO-MCNC: 118 MG/DL (ref 65–100)
GLUCOSE BLD STRIP.AUTO-MCNC: 142 MG/DL (ref 65–100)
GLUCOSE BLD STRIP.AUTO-MCNC: 83 MG/DL (ref 65–100)

## 2020-01-07 PROCEDURE — 74011250637 HC RX REV CODE- 250/637: Performed by: INTERNAL MEDICINE

## 2020-01-07 PROCEDURE — 97535 SELF CARE MNGMENT TRAINING: CPT

## 2020-01-07 PROCEDURE — 74011250637 HC RX REV CODE- 250/637: Performed by: HOSPITALIST

## 2020-01-07 PROCEDURE — 65660000000 HC RM CCU STEPDOWN

## 2020-01-07 PROCEDURE — 74011250636 HC RX REV CODE- 250/636: Performed by: INTERNAL MEDICINE

## 2020-01-07 PROCEDURE — 74011636637 HC RX REV CODE- 636/637: Performed by: INTERNAL MEDICINE

## 2020-01-07 PROCEDURE — 82962 GLUCOSE BLOOD TEST: CPT

## 2020-01-07 PROCEDURE — 97530 THERAPEUTIC ACTIVITIES: CPT

## 2020-01-07 PROCEDURE — 74011250637 HC RX REV CODE- 250/637: Performed by: PHYSICIAN ASSISTANT

## 2020-01-07 PROCEDURE — 65270000029 HC RM PRIVATE

## 2020-01-07 PROCEDURE — 92526 ORAL FUNCTION THERAPY: CPT

## 2020-01-07 RX ORDER — METOPROLOL SUCCINATE 25 MG/1
25 TABLET, EXTENDED RELEASE ORAL DAILY
Status: DISCONTINUED | OUTPATIENT
Start: 2020-01-08 | End: 2020-06-10 | Stop reason: HOSPADM

## 2020-01-07 RX ADMIN — INSULIN GLARGINE 10 UNITS: 100 INJECTION, SOLUTION SUBCUTANEOUS at 21:05

## 2020-01-07 RX ADMIN — GUAIFENESIN 100 MG: 100 SOLUTION ORAL at 17:55

## 2020-01-07 RX ADMIN — Medication 10 ML: at 13:59

## 2020-01-07 RX ADMIN — ATORVASTATIN CALCIUM 80 MG: 80 TABLET, FILM COATED ORAL at 20:47

## 2020-01-07 RX ADMIN — PANTOPRAZOLE SODIUM 40 MG: 40 TABLET, DELAYED RELEASE ORAL at 05:11

## 2020-01-07 RX ADMIN — ENOXAPARIN SODIUM 40 MG: 40 INJECTION SUBCUTANEOUS at 13:59

## 2020-01-07 RX ADMIN — METOPROLOL TARTRATE 50 MG: 50 TABLET, FILM COATED ORAL at 08:52

## 2020-01-07 RX ADMIN — LISINOPRIL 40 MG: 20 TABLET ORAL at 08:52

## 2020-01-07 RX ADMIN — Medication 400 MG: at 08:52

## 2020-01-07 RX ADMIN — Medication 5 ML: at 05:11

## 2020-01-07 RX ADMIN — AMLODIPINE BESYLATE 10 MG: 10 TABLET ORAL at 08:52

## 2020-01-07 RX ADMIN — ASPIRIN 81 MG 81 MG: 81 TABLET ORAL at 08:52

## 2020-01-07 RX ADMIN — ACETAMINOPHEN 650 MG: 325 TABLET, FILM COATED ORAL at 17:55

## 2020-01-07 RX ADMIN — Medication 5 ML: at 20:48

## 2020-01-07 NOTE — PROGRESS NOTES
Problem: Self Care Deficits Care Plan (Adult)  Goal: *Acute Goals and Plan of Care (Insert Text)  Description  1. Patient will complete sitting balance edge of bed with ADL tasks/OT activity with supervision. 2. Patient will demonstrate appropriate safety awareness and protection of L UE during bed mobility and functional transfers with minimal cues. 3. Patient will complete upper body bathing/dressing with minimal assistance to improve participation with ADL. 4. Patient will complete weightbearing into the L UE with ADL tasks with minimal assistance to improve ability to use as a functional assist during ADL tasks. 5. Patient will participation in 8 minutes of therapeutic exercises with moderate assistance to improve strength in the L UE for ADL/functional transfers. 6. Patient will attempt transfer to the chair/BSC within 3 visits to demonstrate advancement with functional transfers. Timeframe: 7 visits   Outcome: Progressing Towards Goal     OCCUPATIONAL THERAPY: Daily Note and AM    1/7/2020  INPATIENT: OT Visit Days: 5  Payor: MEDICAID PENDING / Plan: Philly Handler PENDING / Product Type: Medicaid /      NAME/AGE/GENDER: Kath Huziar is a 53 y.o. male   PRIMARY DIAGNOSIS:  Acute ischemic stroke ([address] [I63.9]  Cerebrovascular accident (CVA) due to embolism ([address] [C13.4] Acute embolic stroke ([address] Acute embolic stroke ([address]       ICD-10: Treatment Diagnosis:    · Generalized Muscle Weakness (M62.81)  · Other lack of cordination (R27.8)  · Hemiplegia and hemiparesis following cerebral infarction affecting   · left non-dominant side (I69.354)  · Abnormal posture (R29.3)   Precautions/Allergies:     Patient has no known allergies. ASSESSMENT:     Mr. Clovis Jordan presents to the hospital with L sided weakness and acute ischemic CVA. MRI revealed acute to subacute L cerebellar and R paramedian yariel infarcts.    1/7/2020 Pt presents in supine upon arrival and transferred to sitting with mod a and additional time. Pt sat edge of bed working on increasing balance, postural alignment, and ADL training. Pt completed sit to stand and SPT from the bed to the chair with mod a x's 2. Pt continues to make progress toward goals. Continue POC. This section established at most recent assessment   PROBLEM LIST (Impairments causing functional limitations):  1. Decreased Strength  2. Decreased ADL/Functional Activities  3. Decreased Transfer Abilities  4. Decreased Ambulation Ability/Technique  5. Decreased Balance  6. Decreased Activity Tolerance  7. Increased Fatigue  8. Decreased Flexibility/Joint Mobility  9. Decreased Knowledge of Precautions  10. Decreased [place] with Home Exercise Program  11. Decreased Cognition   INTERVENTIONS PLANNED: (Benefits and precautions of occupational therapy have been discussed with the patient.)  1. Activities of daily living training  2. Adaptive equipment training  3. Balance training  4. Clothing management  5. Cognitive training  6. Donning&doffing training  7. Keanu tech training  8. Neuromuscular re-eduation  9. Therapeutic activity  10. Therapeutic exercise     TREATMENT PLAN: Frequency/Duration: Follow patient 3 times per week to address above goals. Rehabilitation Potential For Stated Goals: Excellent     REHAB RECOMMENDATIONS (at time of discharge pending progress):    Placement: It is my opinion, based on this patient's performance to date, that Mr. Anjel Gil may benefit from intensive therapy at an [address] [place] [address] after discharge due to potential to make ongoing and sustainable functional improvement that is of practical value. .  Equipment:    TBD               OCCUPATIONAL PROFILE AND HISTORY:   History of Present Injury/Illness (Reason for Referral):  See H&P  Past Medical History/Comorbidities:   Mr. Anjel Gil  has a past medical history of Acute ischemic stroke [address] (12/12/2019), Acute pancreatitis (11/19/2014), Cerebrovascular accident (CVA) due to embolism ([facility] (12/12/2019), Diabetes ([facility] (2002), Diabetes ([address] Diabetes mellitus, and Hypertension. He also has no past medical history of Arthritis, Asthma, Autoimmune disease ([address] CAD (coronary artery disease), Cancer ([address] Chronic kidney disease, COPD, Dementia, Dementia ([facility] Heart failure ([facility] Ill-defined condition, Infectious disease, Liver disease, Other ill-defined conditions(799.89), Psychiatric disorder, PUD (peptic ulcer disease), Seizures ([address] or Sleep disorder. Mr. Ana Olson  has a past surgical history that includes hx hernia repair and hx orthopaedic. Social History/Living Environment:   Home Environment: Apartment  # Steps to Enter: 12  Rails to Enter: Yes  [employer] : Bilateral  One/Two Story Residence: One story  Living Alone: No  Support Systems: Spouse/Significant Other/Partner  Patient Expects to be Discharged to[de-identified] Unknown  Current DME Used/Available at Home: None  Tub or Shower Type: Tub/Shower combination  Prior Level of Function/Work/Activity:  Pt lives at home with his wife. Pt is typically independent with ADL/functional mobility. Pt does not drive. Pt was working part-time at [employer]. Personal Factors:          Age:  53 y.o. Past/Current Experience:  CVA with flaccid L side        Other factors that influence how disability is experienced by the patient:  multiple co-morbidities    Number of Personal Factors/Comorbidities that affect the Plan of Care: Extensive review of physical, cognitive, and psychosocial performance (3+):  HIGH COMPLEXITY   ASSESSMENT OF OCCUPATIONAL PERFORMANCE[de-identified]   Activities of Daily Living:   Basic ADLs (From Assessment) Complex ADLs (From Assessment)   Feeding: Stand-by assistance  Oral Facial Hygiene/Grooming: Moderate assistance  Bathing: Maximum assistance  Upper Body Dressing: Maximum assistance  Lower Body Dressing: Total assistance  Toileting:  Total assistance Instrumental ADL  Meal Preparation: Total assistance  Homemaking: Total assistance   Grooming/Bathing/Dressing Activities of Daily Living   Grooming  Grooming Assistance: Stand-by assistance  Position Performed: Seated edge of bed  Washing Face: Stand-by assistance  Washing Hands: Maximum assistance     Upper Body Bathing  Bathing Assistance: Moderate assistance;Maximum assistance  Position Performed: Seated edge of bed     Lower Body Bathing  Bathing Assistance: Total assistance(dependent)  Lower Body : Total assistance (dependent)     Upper Body Dressing Assistance  Hospital Gown: Maximum assistance       Bed/Mat Mobility  Rolling: Minimum assistance  Supine to Sit: Moderate assistance  Sit to Stand: Moderate assistance  Stand to Sit: Moderate assistance  Scooting: Moderate assistance     Most Recent Physical Functioning:   Gross Assessment:                  Posture:     Balance:  Sitting: Impaired  Sitting - Static: Fair (occasional)  Sitting - Dynamic: Fair (occasional)  Standing: Impaired  Standing - Static: Poor  Standing - Dynamic : Poor Bed Mobility:  Rolling: Minimum assistance  Supine to Sit: Moderate assistance  Scooting: Moderate assistance  Wheelchair Mobility:     Transfers:  Sit to Stand: Moderate assistance  Stand to Sit: Moderate assistance  Stand Pivot Transfers: Moderate assistance;Assist x2            Patient Vitals for the past 6 hrs:   BP BP Patient Position SpO2 Pulse   01/07/20 0800 108/74 At rest 99 % 65   01/07/20 1200 95/68 At rest 97 % (!) 58       Mental Status  Neurologic State: Alert  Orientation Level: Oriented X4  Cognition: Follows commands  Perception: Cues to attend to left side of body, Cues to maintain midline in standing, Tactile, Verbal, Visual  Perseveration: No perseveration noted  Safety/Judgement: Fall prevention                          Physical Skills Involved:  1. Range of Motion  2. Balance  3. Strength  4. Activity Tolerance  5. Fine Motor Control  6.  Gross Motor Control Cognitive Skills Affected (resulting in the inability to perform in a timely and safe manner):  1. Perception  2. Expression Psychosocial Skills Affected:  1. Habits/Routines  2. Self-Awareness   Number of elements that affect the Plan of Care: 5+:  HIGH COMPLEXITY   CLINICAL DECISION MAKIN[address] [place] [address] AM-PAC 6 Clicks   Daily Activity Inpatient Short Form  How much help from another person does the patient currently need. .. Total A Lot A Little None   1. Putting on and taking off regular lower body clothing? [x] 1   [] 2   [] 3   [] 4   2. Bathing (including washing, rinsing, drying)? [] 1   [x] 2   [] 3   [] 4   3. Toileting, which includes using toilet, bedpan or urinal?   [x] 1   [] 2   [] 3   [] 4   4. Putting on and taking off regular upper body clothing? [] 1   [x] 2   [] 3   [] 4   5. Taking care of personal grooming such as brushing teeth? [] 1   [x] 2   [] 3   [] 4   6. Eating meals? [] 1   [] 2   [x] 3   [] 4   © , Trustees of [address] [place] [address], under license to [employer]. All rights reserved      Score:  Initial: 11 Most Recent: X (Date: -- )    Interpretation of Tool:  Represents activities that are increasingly more difficult (i.e. Bed mobility, Transfers, Gait). Medical Necessity:     · Patient demonstrates   · good and excellent  ·  rehab potential due to higher previous functional level. Reason for Services/Other Comments:  · Patient continues to require skilled intervention due to   · Decreased independence with ADL/functional transfers that impacts overall quality of life. · .   Use of outcome tool(s) and clinical judgement create a POC that gives a: MODERATE COMPLEXITY         TREATMENT:   (In addition to Assessment/Re-Assessment sessions the following treatments were rendered)     Pre-treatment Symptoms/Complaints:    Pain: Initial:   Pain Intensity 1: 0  Post Session:  0       Therapeutic Activity: (13 minutes):   Therapeutic activities including Bed transfers, Chair transfers, static/dynamic sitting and static/dynamic standing to improve mobility, strength and balance. Required moderate assist to promote static and dynamic balance in standing. Self Care: (10 minutes): Procedure(s) (per grid) utilized to improve and/or restore self-care/home management as related to dressing and bathing. Required maximal manual and   cueing to facilitate activities of daily living skills and compensatory activities. Date:  12/15 Date:   Date:     Activity/Exercise Parameters Parameters Parameters   Shifting weight side to side and weightbearing into L UE 10 reps      Trunk flexion   5 reps      Trunk extension to upright posture 5 reps      Dynamic reaching to the L 15 reps      Identifying and visually attending to L side 6 reps                      Braces/Orthotics/Lines/Etc:   ·  None  Treatment/Session Assessment:    · Response to Treatment:  Pt demonstrated good participation. · Interdisciplinary Collaboration:   o Certified Occupational Therapy Assistant  o Registered Nurse  o Rehabilitation Attendant  · After treatment position/precautions:   o Up in chair  o Bed alarm/tab alert on  o Bed/Chair-wheels locked  o Call light within reach  o RN notified   · Compliance with Program/Exercises: Will assess as treatment progresses. · Recommendations/Intent for next treatment session: \"Next visit will focus on advancements to more challenging activities and reduction in assistance provided\".   Total Treatment Duration:  OT Patient Time In/Time Out  Time In: 1005  Time Out: [facility]

## 2020-01-07 NOTE — PROGRESS NOTES
01/07/20 0719   NIH Stroke Scale   Interval Other (comment)  (Dual Shift Change, Sameera Mane RN)   LOC 0   LOC Questions 0   LOC Commands 0   Best Gaze 0   Visual 0   Facial Palsy 2   Motor Right Arm 0   Motor Left Arm 4   Motor Right Leg 0   Motor Left Leg 4   Limb Ataxia 0   Sensory 0   Best Language 1   Dysarthria 0   Extinction and Inattention 0   Total 11

## 2020-01-07 NOTE — PROGRESS NOTES
Hospitalist Progress Note     Admit Date:  2019  9:07 AM   Name:  Cristina Enriquez   Age:  53 y.o.  :  1975   MRN:  [id number]   PCP:  Benjamin Montenegro MD      Subjective:     52 y/o smoker with DM, HTN, who presented to ER  with L leg and arm weakness, L facial droop, dysarthria. First noted L leg weakness late night  when he woke up to go to the bathroom. He was normal when he went to bed around 1030. EMS called. Noted to have slurred speech and L facial droop as well. Code S called in ER around 9am.  MRI with acute infarcts in L cerebellar hemisphere, deep frontoparietal white matter, and R paramedian yariel. Also noted old lacunar infarcts. No large vessel occlusion on CTA, but some stenosis noted. He was started on asa, statins. Neurology consulted. An ECHO showed PFO. Plan for cardiology referral and evaluate for closure as outpatient. PT/OT suggested inpatient rehabilitation. The patient is uninsured and the only option at this moment is [facility]. Today, L weakness unchanged, no ac events    Objective:     Patient Vitals for the past 24 hrs:   Temp Pulse Resp BP SpO2   20 1200 98.7 °F (37.1 °C) (!) 58 18 95/68 97 %   20 0800 98.2 °F (36.8 °C) 65 18 108/74 99 %   20 0400 98.1 °F (36.7 °C) (!) 57 20 132/80 98 %   20 0000 97.5 °F (36.4 °C) 61 20 131/81 98 %   20 2000 98.1 °F (36.7 °C) (!) 52 18 119/73 95 %   20 1600 98.2 °F (36.8 °C) (!) 57 18 110/68 96 %     Oxygen Therapy  O2 Sat (%): 97 % (20 1200)  Pulse via Oximetry: 56 beats per minute (20)  O2 Device: Room air (20)  No intake or output data in the 24 hours ending 20 1221      REVIEW OF SYSTEMS: Comprehensive ROS performed and negative except as stated in HPI. Physical Examination:  General:          Well nourished. Alert and oriented. Mild distress  CV:                  RRR. No murmur, rub, or gallop.     Lungs:             Clear to auscultation bilaterally. No wheezing, rhonchi, or rales  Extremities:     Warm and dry. No cyanosis or edema. Neurologic:      CN II-XII intact except for mild L facial droop. Sensation intact. PERRLA.  dysarthria. No                          confusion. STR o/5 LUE and LLE. Dysarthria noted. Skin:                No rashes or jaundice. No wounds. Psych:             Appears depressed    Data Review:  I have reviewed all labs, meds, telemetry events, and studies from the last 24 hours.     Recent Results (from the past 24 hour(s))   GLUCOSE, POC    Collection Time: 01/06/20  4:04 PM   Result Value Ref Range    Glucose (POC) 93 65 - 100 mg/dL   GLUCOSE, POC    Collection Time: 01/06/20  9:28 PM   Result Value Ref Range    Glucose (POC) 103 (H) 65 - 100 mg/dL   GLUCOSE, POC    Collection Time: 01/07/20  7:24 AM   Result Value Ref Range    Glucose (POC) 142 (H) 65 - 100 mg/dL   GLUCOSE, POC    Collection Time: 01/07/20 10:45 AM   Result Value Ref Range    Glucose (POC) 118 (H) 65 - 100 mg/dL        All Micro Results     None          Current Meds:  Current Facility-Administered Medications   Medication Dose Route Frequency    magnesium oxide (MAG-OX) tablet 400 mg  400 mg Oral BID    metoprolol tartrate (LOPRESSOR) tablet 50 mg  50 mg Oral BID    polyethylene glycol (MIRALAX) packet 17 g  17 g Oral DAILY PRN    insulin glargine (LANTUS) injection 10 Units  10 Units SubCUTAneous QHS    pantoprazole (PROTONIX) tablet 40 mg  40 mg Oral ACB    guaiFENesin (ROBITUSSIN) 100 mg/5 mL oral liquid 100 mg  100 mg Oral Q4H PRN    hydrALAZINE (APRESOLINE) 20 mg/mL injection 20 mg  20 mg IntraVENous Q4H PRN    atorvastatin (LIPITOR) tablet 80 mg  80 mg Oral QHS    amLODIPine (NORVASC) tablet 10 mg  10 mg Oral DAILY    lisinopril (PRINIVIL, ZESTRIL) tablet 40 mg  40 mg Oral DAILY    sodium chloride (NS) flush 5-40 mL  5-40 mL IntraVENous Q8H    sodium chloride (NS) flush 5-40 mL  5-40 mL IntraVENous PRN    ondansetron [facility]) injection 4 mg  4 mg IntraVENous Q4H PRN    aspirin chewable tablet 81 mg  81 mg Oral DAILY    acetaminophen (TYLENOL) tablet 650 mg  650 mg Oral Q4H PRN    enoxaparin (LOVENOX) injection 40 mg  40 mg SubCUTAneous Q24H    insulin lispro (HUMALOG) injection   SubCUTAneous AC&HS    dextrose 40% (GLUTOSE) oral gel 1 Tube  15 g Oral PRN    glucagon (GLUCAGEN) injection 1 mg  1 mg IntraMUSCular PRN    dextrose (D50W) injection syrg 12.5-25 g  25-50 mL IntraVENous PRN       Diet:  DIET NUTRITIONAL SUPPLEMENTS  DIET DIABETIC CONSISTENT CARB    Other Studies (last 24 hours):  No results found. Assessment and Plan:     Hospital Problems as of 1/7/2020 Date Reviewed: 3/3/2017          Codes Class Noted - Resolved POA    PFO (patent foramen ovale) ICD-10-CM: Q21.1  ICD-9-CM: 745.5  12/17/2019 - Present Yes        Arrhythmia ICD-10-CM: I49.9  ICD-9-CM: 427.9  12/15/2019 - Present Yes        Hyperlipemia ICD-10-CM: E78.5  ICD-9-CM: 272.4  12/15/2019 - Present Yes        * (Principal) Acute embolic stroke [facility]) IIR-48-RD: I63.9  ICD-9-CM: 434.11  12/12/2019 - Present Yes        Hypertension (Chronic) ICD-10-CM: I10  ICD-9-CM: 401.9  Unknown - Present Yes        Type 2 diabetes mellitus (HCC) (Chronic) ICD-10-CM: E11.9  ICD-9-CM: 250.00  Unknown - Present Yes              A/P:    Acute embolic stroke  Continues to have left sided neglect  Evaluated by cardiology and neurology  -MRI head: with acute infarcts in L cerebellar hemisphere, deep frontoparietal white matter, and R paramedian yariel. Also noted old lacunar infarcts.    -CTA head: No large vessel occlusion   -ISMAEL: 3 mm PFO     Plan:  -Cont statin, ASA  -PT/OT/ST  -Needs outpatient cardiology follow-up for event monitor and PFO closure.      Active Problems:     Hypertension  BP well controlled at this time  Episodes of bradycardia noted     Plan:  -Continue metoprolol; will adjust dose if repeat episodes of bradycardia  -Continue amlodipine     Type 2 diabetes mellitus - cont on Lantus and SSI     Arrhythmia - had brief wide complex tachycardia early in admission, will need event monitor as outpt per cards     Hyperlipemia - statin therapy     PFO (patent foramen ovale) - will need closure as outpatient per cardiology       Dispo; AWAITING  medicaid approval and placement

## 2020-01-07 NOTE — PROGRESS NOTES
01/06/20 1900   NIH Stroke Scale   Interval Other (comment)   LOC 0   LOC Questions 0   LOC Commands 0   Best Gaze 0   Visual 0   Facial Palsy 2   Motor Right Arm 0   Motor Left Arm 4   Motor Right Leg 0   Motor Left Leg 4   Limb Ataxia 0   Sensory 0   Best Language 0   Dysarthria 1   Extinction and Inattention 0   Total 11   Dual NIH with Jackie EDWARDS

## 2020-01-07 NOTE — PROGRESS NOTES
Problem: Patient Education: Go to Patient Education Activity  Goal: Patient/Family Education  1/6/2020 2138 by Mary Baca RN  Outcome: Progressing Towards Goal  1/6/2020 2138 by Mary Baca RN  Outcome: Progressing Towards Goal     Problem: Pressure Injury - Risk of  Goal: *Prevention of pressure injury  Description  Document Dewey Scale and appropriate interventions in the flowsheet. 1/6/2020 2138 by Mary Baca RN  Outcome: Progressing Towards Goal  Note: Pressure Injury Interventions:  Sensory Interventions: Assess changes in LOC, Float heels, Keep linens dry and wrinkle-free, Maintain/enhance activity level, Minimize linen layers, Pressure redistribution bed/mattress (bed type), Turn and reposition approx. every two hours (pillows and wedges if needed)    Moisture Interventions: Absorbent underpads, Apply protective barrier, creams and emollients, Check for incontinence Q2 hours and as needed, Limit adult briefs, Maintain skin hydration (lotion/cream), Minimize layers, Moisture barrier, Offer toileting Q_hr    Activity Interventions: Chair cushion, Increase time out of bed, Pressure redistribution bed/mattress(bed type), PT/OT evaluation    Mobility Interventions: Float heels, HOB 30 degrees or less, Pressure redistribution bed/mattress (bed type), PT/OT evaluation    Nutrition Interventions: Document food/fluid/supplement intake    Friction and Shear Interventions: Apply protective barrier, creams and emollients, Foam dressings/transparent film/skin sealants, HOB 30 degrees or less, Minimize layers             1/6/2020 2138 by Mary Baca RN  Outcome: Progressing Towards Goal  Note: Pressure Injury Interventions:  Sensory Interventions: Assess changes in LOC, Float heels, Keep linens dry and wrinkle-free, Maintain/enhance activity level, Minimize linen layers, Pressure redistribution bed/mattress (bed type), Turn and reposition approx.  every two hours (pillows and wedges if needed)    Moisture Interventions: Absorbent underpads, Apply protective barrier, creams and emollients, Check for incontinence Q2 hours and as needed, Limit adult briefs, Maintain skin hydration (lotion/cream), Minimize layers, Moisture barrier, Offer toileting Q_hr    Activity Interventions: Chair cushion, Increase time out of bed, Pressure redistribution bed/mattress(bed type), PT/OT evaluation    Mobility Interventions: Float heels, HOB 30 degrees or less, Pressure redistribution bed/mattress (bed type), PT/OT evaluation    Nutrition Interventions: Document food/fluid/supplement intake    Friction and Shear Interventions: Apply protective barrier, creams and emollients, Foam dressings/transparent film/skin sealants, HOB 30 degrees or less, Minimize layers                Problem: Patient Education: Go to Patient Education Activity  Goal: Patient/Family Education  1/6/2020 2138 by Rashida Cohen RN  Outcome: Progressing Towards Goal  1/6/2020 2138 by Rashida Cohen RN  Outcome: Progressing Towards Goal     Problem: Falls - Risk of  Goal: *Absence of Falls  Description  Document Fox Tatyroly Fall Risk and appropriate interventions in the flowsheet.   1/6/2020 2138 by Rashida Cohen RN  Outcome: Progressing Towards Goal  Note: Fall Risk Interventions:  Mobility Interventions: Bed/chair exit alarm, Communicate number of staff needed for ambulation/transfer, Patient to call before getting OOB    Mentation Interventions: Bed/chair exit alarm    Medication Interventions: Bed/chair exit alarm, Patient to call before getting OOB    Elimination Interventions: Bed/chair exit alarm, Call light in reach, Patient to call for help with toileting needs    History of Falls Interventions: Bed/chair exit alarm, Door open when patient unattended      1/6/2020 2138 by Rashida Cohen RN  Outcome: Progressing Towards Goal  Note: Fall Risk Interventions:  Mobility Interventions: Bed/chair exit alarm, Communicate number of staff needed for ambulation/transfer, Patient to call before getting OOB    Mentation Interventions: Bed/chair exit alarm    Medication Interventions: Bed/chair exit alarm, Patient to call before getting OOB    Elimination Interventions: Bed/chair exit alarm, Call light in reach, Patient to call for help with toileting needs    History of Falls Interventions: Bed/chair exit alarm, Door open when patient unattended         Problem: Patient Education: Go to Patient Education Activity  Goal: Patient/Family Education  1/6/2020 2138 by Ender Gutierres RN  Outcome: Progressing Towards Goal  1/6/2020 2138 by Ender Gutierres RN  Outcome: Progressing Towards Goal     Problem: Diabetes Self-Management  Goal: *Disease process and treatment process  Description  Define diabetes and identify own type of diabetes; list 3 options for treating diabetes. 1/6/2020 2138 by Ender Gutierres RN  Outcome: Progressing Towards Goal  1/6/2020 2138 by Ender Gutierres RN  Outcome: Progressing Towards Goal  Goal: *Incorporating nutritional management into lifestyle  Description  Describe effect of type, amount and timing of food on blood glucose; list 3 methods for planning meals. 1/6/2020 2138 by Ender Gutierres RN  Outcome: Progressing Towards Goal  1/6/2020 2138 by Ender Gutierres RN  Outcome: Progressing Towards Goal  Goal: *Incorporating physical activity into lifestyle  Description  State effect of exercise on blood glucose levels. 1/6/2020 2138 by Ender Gutierres RN  Outcome: Progressing Towards Goal  1/6/2020 2138 by Ender Gutierres RN  Outcome: Progressing Towards Goal  Goal: *Developing strategies to promote health/change behavior  Description  Define the ABC's of diabetes; identify appropriate screenings, schedule and personal plan for screenings.   1/6/2020 2138 by Ender Gutierres RN  Outcome: Progressing Towards Goal  1/6/2020 2138 by Ender Gutierres RN  Outcome: Progressing Towards Goal  Goal: *Using medications safely  Description  State effect of diabetes medications on diabetes; name diabetes medication taking, action and side effects. 1/6/2020 2138 by Robel Diaz RN  Outcome: Progressing Towards Goal  1/6/2020 2138 by Robel Diaz RN  Outcome: Progressing Towards Goal  Goal: *Monitoring blood glucose, interpreting and using results  Description  Identify recommended blood glucose targets  and personal targets. 1/6/2020 2138 by Robel Diaz RN  Outcome: Progressing Towards Goal  1/6/2020 2138 by Roble Diaz RN  Outcome: Progressing Towards Goal  Goal: *Prevention, detection, treatment of acute complications  Description  List symptoms of hyper- and hypoglycemia; describe how to treat low blood sugar and actions for lowering  high blood glucose level. 1/6/2020 2138 by Robel Diaz RN  Outcome: Progressing Towards Goal  1/6/2020 2138 by Robel Diaz RN  Outcome: Progressing Towards Goal  Goal: *Prevention, detection and treatment of chronic complications  Description  Define the natural course of diabetes and describe the relationship of blood glucose levels to long term complications of diabetes.   1/6/2020 2138 by Robel Diaz RN  Outcome: Progressing Towards Goal  1/6/2020 2138 by Robel Diaz RN  Outcome: Progressing Towards Goal  Goal: *Developing strategies to address psychosocial issues  Description  Describe feelings about living with diabetes; identify support needed and support network  1/6/2020 2138 by Robel Diaz RN  Outcome: Progressing Towards Goal  1/6/2020 2138 by Robel Diaz RN  Outcome: Progressing Towards Goal     Problem: Patient Education: Go to Patient Education Activity  Goal: Patient/Family Education  1/6/2020 2138 by Robel Diaz RN  Outcome: Progressing Towards Goal  1/6/2020 2138 by Robel Diaz RN  Outcome: Progressing Towards Goal     Problem: General Medical Care Plan  Goal: *Vital signs within specified parameters  Outcome: Progressing Towards Goal  Goal: *Labs within defined limits  Outcome: Progressing Towards Goal  Goal: *Absence of infection signs and symptoms  Description  Wash hand more often   Outcome: Progressing Towards Goal  Goal: *Optimal pain control at patient's stated goal  Outcome: Progressing Towards Goal  Goal: *Skin integrity maintained  Outcome: Progressing Towards Goal  Goal: *Fluid volume balance  Outcome: Progressing Towards Goal  Goal: *Optimize nutritional status  Outcome: Progressing Towards Goal  Goal: *Anxiety reduced or absent  Outcome: Progressing Towards Goal  Goal: *Progressive mobility and function (eg: ADL's)  Outcome: Progressing Towards Goal     Problem: Patient Education: Go to Patient Education Activity  Goal: Patient/Family Education  Outcome: Progressing Towards Goal     Problem: Patient Education: Go to Patient Education Activity  Goal: Patient/Family Education  Outcome: Progressing Towards Goal     Problem: Patient Education: Go to Patient Education Activity  Goal: Patient/Family Education  Outcome: Progressing Towards Goal     Problem: Pain  Goal: *Control of Pain  Outcome: Progressing Towards Goal     Problem: Patient Education: Go to Patient Education Activity  Goal: Patient/Family Education  Outcome: Progressing Towards Goal

## 2020-01-07 NOTE — PROGRESS NOTES
Problem: Mobility Impaired (Adult and Pediatric)  Goal: *Acute Goals and Plan of Care  Description  Goals adjusted 12/31/19  STG:  (1.) Mr. Joanne Beach will move from supine to sit and sit to supine , scoot up and down and roll side to side with MINIMAL ASSIST within 3 treatment day(s). (2.) Mr. Joanne Beach will transfer from bed to chair and chair to bed with MODERATE ASSIST using the least restrictive device within 3 treatment day(s). (3.) Mr. Joanne Beach will perform standing static and dynamic balance activities x 10 minutes with MODERATE ASSISTx1 to improve safety within 3 treatment day(s). LTG:  (1.) Mr. Joanne Beach will move from supine to sit and sit to supine , scoot up and down and roll side to side with CONTACT GUARD ASSIST within 7 treatment day(s). (2.) Mr. Joanne Beach will transfer from bed to chair and chair to bed with MINIMAL ASSIST using the least restrictive device within 7 treatment day(s). (3.) Mr. Joanne Beach will perform standing static and dynamic balance activities x 20 minutes with MINIMAL ASSIST to improve safety within 7 treatment day(s). (4.) Mr. Joanne Beach will perform bilateral lower extremity exercises x 15 min for HEP with SUPERVISION to improve strength, endurance, and functional mobility within 7 treatment day(s).      PHYSICAL THERAPY: Daily Note and PM 1/7/2020  INPATIENT: PT Visit Days : 5  Payor: MEDICAID PENDING / Plan: Yvette Garcia PENDING / Product Type: Medicaid /       NAME/AGE/GENDER: Mikey Stephen is a 53 y.o. male   PRIMARY DIAGNOSIS: Acute ischemic stroke ([address] [I63.9]  Cerebrovascular accident (CVA) due to embolism ([address] [H36.8] Acute embolic stroke ([address] Acute embolic stroke ([address]       ICD-10: Treatment Diagnosis:   · Other lack of cordination (R27.8)  · Difficulty in walking, Not elsewhere classified (R26.2)  · Other abnormalities of gait and mobility (R26.89)  · Unspecified Lack of Coordination (R27.9)  · Hemiplegia and hemiparesis following cerebral infarction affecting   · left non-dominant side (J72.391)   Precaution/Allergies:  Patient has no known allergies. ASSESSMENT:     Mr. Fiorella Urbina is a 53year old admitted with acute CVA with left hemiparesis and left inattention. Patient is supine on contact and agreeable to PT, he is very motivated and works hard with therapy. He then performs supine to sit with min A x2 and demonstrates good static sitting balance at the edge of the bed once positioned well. The patient performs sit to stand with min-mod A x2 to the rolling walker and demonstrates improved standing balance today. He is able to stand with min A x2 for two minutes. He then sits with min A x2 for an extended rest break. The patient performs sit to stand 2 more times and on the last stand he performs weight shifting to the right and left. He then performs sit to supine with min A and is positioned for comfort with needs in reach. Overall the patient is making good progress toward therapy goals as indicated by increased activity tolerance and improved standing balance. Will continue skilled PT treatment as patient is still below functional baseline. This section established at most recent assessment   PROBLEM LIST (Impairments causing functional limitations):  1. Decreased Strength  2. Decreased ADL/Functional Activities  3. Decreased Transfer Abilities  4. Decreased Ambulation Ability/Technique  5. Decreased Balance  6. Increased Pain  7. Decreased Activity Tolerance  8. Increased Fatigue  9. Decreased Flexibility/Joint Mobility   INTERVENTIONS PLANNED: (Benefits and precautions of physical therapy have been discussed with the patient.)  1. Balance Exercise  2. Bed Mobility  3. Family Education  4. Gait Training  5. Home Exercise Program (HEP)  6. Manual Therapy  7. Neuromuscular Re-education/Strengthening  8. Range of Motion (ROM)  9. Therapeutic Activites  10. Therapeutic Exercise/Strengthening  11.  Transfer Training     TREATMENT PLAN: Frequency/Duration: 3 times a week for duration of hospital stay  Rehabilitation Potential For Stated Goals: Good     REHAB RECOMMENDATIONS (at time of discharge pending progress):    Placement: It is my opinion, based on this patient's performance to date, that Mr. Radha Roldan may benefit from intensive therapy at an [facility] after discharge due to a probable need for 24 hour rehab nursing, a probable need for multiple therapy disciplines, and potential to make ongoing and sustainable functional improvement that is of practical value. .  Equipment:    TBD pending progress with therapy. HISTORY:   History of Present Injury/Illness (Reason for Referral):  Per H&P: \"Pt is a 54 y/o smoker with DM, HTN, who presented to ER with L leg and arm weakness, L facial droop, dysarthria. First noted L leg weakness late night 12/11 when he woke up to go to the bathroom. He was normal when he went to bed around 1030. Woke up this morning and had persistent weakness L leg and also now noted in L arm. EMS called. Noted to have slurred speech and L facial droop as well. Code S called in ER around 9am.  MRI with acute infarcts in L cerebellar hemisphere, deep frontoparietal white matter, and R paramedian yariel. Also noted old lacunar infarcts. No large vessel occlusion on CTA, but some stenosis noted. No hx afib, TIA, CVA. No CP, palpitations, SOB. \"  Past Medical History/Comorbidities:   Mr. Radha Roldan  has a past medical history of Acute ischemic stroke ([address] (12/12/2019), Acute pancreatitis (11/19/2014), Cerebrovascular accident (CVA) due to embolism ([address] (12/12/2019), Diabetes ([address] (2002), Diabetes ([address] Diabetes mellitus, and Hypertension.  He also has no past medical history of Arthritis, Asthma, Autoimmune disease ([address] CAD (coronary artery disease), Cancer ([address] Chronic kidney disease, COPD, Dementia, Dementia ([address] Heart failure ([address] Ill-defined condition, Infectious disease, Liver disease, Other ill-defined conditions(799.89), Psychiatric disorder, PUD (peptic ulcer disease), Seizures ([facility] or Sleep disorder. Mr. Krys Grimaldo  has a past surgical history that includes hx hernia repair and hx orthopaedic. Social History/Living Environment:   Home Environment: Apartment  # Steps to Enter: 12  Rails to Enter: Yes  [employer] : Bilateral  One/Two Story Residence: One story  Living Alone: No  Support Systems: Spouse/Significant Other/Partner  Patient Expects to be Discharged to[de-identified] Unknown  Current DME Used/Available at Home: None  Tub or Shower Type: Tub/Shower combination  Prior Level of Function/Work/Activity:  Independent, lives with wife in 2nd story 1 level apartment. No recent falls. Number of Personal Factors/Comorbidities that affect the Plan of Care: 1-2: MODERATE COMPLEXITY   EXAMINATION:   Most Recent Physical Functioning:   Gross Assessment:                  Posture:     Balance:  Sitting: Impaired  Sitting - Static: Good (unsupported)  Sitting - Dynamic: Fair (occasional); Good (unsupported)  Standing: Impaired  Standing - Static: Fair  Standing - Dynamic : Poor Bed Mobility:  Rolling: Minimum assistance  Supine to Sit: Minimum assistance  Sit to Supine: Minimum assistance  Scooting: Minimum assistance  Wheelchair Mobility:     Transfers:  Sit to Stand: Minimum assistance;Assist x2  Stand to Sit: Minimum assistance;Assist x2  Interventions: Safety awareness training;Verbal cues  Gait:            Body Structures Involved:  1. Nerves  2. Voice/Speech  3. Bones  4. Joints  5. Muscles Body Functions Affected:  1. Mental  2. Sensory/Pain  3. Neuromusculoskeletal  4. Movement Related Activities and Participation Affected:  1. General Tasks and Demands  2. Mobility  3. Self Care  4.  Interpersonal Interactions and Relationships   Number of elements that affect the Plan of Care: 4+: HIGH COMPLEXITY   CLINICAL PRESENTATION:   Presentation: Evolving clinical presentation with changing clinical characteristics: MODERATE COMPLEXITY   CLINICAL DECISION MAKIN[address] [place] [id number] AM-PAC 6 Clicks   Basic Mobility Inpatient Short Form  How much difficulty does the patient currently have. .. Unable A Lot A Little None   1. Turning over in bed (including adjusting bedclothes, sheets and blankets)? [] 1   [] 2   [x] 3   [] 4   2. Sitting down on and standing up from a chair with arms ( e.g., wheelchair, bedside commode, etc.)   [] 1   [x] 2   [] 3   [] 4   3. Moving from lying on back to sitting on the side of the bed? [] 1   [x] 2   [] 3   [] 4   How much help from another person does the patient currently need. .. Total A Lot A Little None   4. Moving to and from a bed to a chair (including a wheelchair)? [] 1   [x] 2   [] 3   [] 4   5. Need to walk in hospital room? [] 1   [x] 2   [] 3   [] 4   6. Climbing 3-5 steps with a railing? [] 1   [x] 2   [] 3   [] 4   © 2007, Trustees of [address] [place] [id number], under license to [employer]. All rights reserved      Score:  Initial: 13 Most Recent: 13 (Date:19 )    Interpretation of Tool:  Represents activities that are increasingly more difficult (i.e. Bed mobility, Transfers, Gait). Medical Necessity:     · Patient is expected to demonstrate progress in   · strength, range of motion, balance, coordination, and functional technique  ·  to   · increase independence with all mobility. · .  Reason for Services/Other Comments:  · Patient continues to require skilled intervention due to   · medical complications and mobility deficits which impact his level of function, safety, and independence as indicated above.    · .   Use of outcome tool(s) and clinical judgement create a POC that gives a: Questionable prediction of patient's progress: MODERATE COMPLEXITY        TREATMENT:   (In addition to Assessment/Re-Assessment sessions the following treatments were rendered)   Pre-treatment Symptoms/Complaints:  \"yes\"  Pain: Initial:   Pain Intensity 1: 0  Post Session:  none     REASSESSMENT    Therapeutic Activity: (    23 min): Therapeutic activities including bed mobility, rolling, scooting, and sit <> stand transfer training to improve mobility, strength, balance, and coordination. Required moderate cues with minimal physical assist x2   to promote coordination of bilateral, upper extremity(s), lower extremity(s) and promote motor control of bilateral, upper extremity(s), lower extremity(s). Braces/Orthotics/Lines/Etc:   · O2 Device: Room Air   Treatment/Session Assessment:    · Response to Treatment:  see above. Works very hard with therapy. · Interdisciplinary Collaboration:   o Physical Therapy Assistant  o Registered Nurse  · After treatment position/precautions:   o Supine in bed  o Bed/Chair-wheels locked  o Bed in low position  o Call light within reach  o RN notified  o Family at bedside   · Compliance with Program/Exercises: Compliant all of the time  · Recommendations/Intent for next treatment session: \"Next visit will focus on advancements to more challenging activities and reduction in assistance provided\".     Total Treatment Duration:  PT Patient Time In/Time Out  Time In: 1303  Time Out: [address] [place], [facility]

## 2020-01-07 NOTE — PROGRESS NOTES
Dysphagia(re evaluation 1/7/2020)  LTG: Patient will tolerate least restrictive diet without overt signs or symptoms of airway compromise. STG: Patient will tolerate mechanical soft diet(ground meats/chopped vegetables) and thin liquids without overt signs or symptoms of airway compromise. STG: Patient will demonstrate use of compensatory strategies to improve swallow safety/function with 90% accuracy given minimal cueing  STG: Patient participate in exercises to improve oral motor movement with 80% accuracy given minimal cueing  STG: Patient will participate in modified barium swallow study as clinically indicated. SPEECH LANGUAGE PATHOLOGY: DYSPHAGIA- Re-evaluation    NAME/AGE/GENDER: Cristina Enriquez is a 53 y.o. male  DATE: 1/7/2020  PRIMARY DIAGNOSIS: Acute ischemic stroke [facility]) [I63.9]  Cerebrovascular accident (CVA) due to embolism ([facility] [I63.9]      ICD-10: Treatment Diagnosis: R13.12 Dysphagia, Oropharyngeal Phase    INTERDISCIPLINARY COLLABORATION: Registered Nurse and Physician  PRECAUTIONS/ALLERGIES: Patient has no known allergies. SUBJECTIVE   Upon entry patient laying flat in bed eating wagner cracker. raised HOB. Patient expressed he does not like the ground meats-wants regular food. Completed dysphagia re-evaluation this date. Patient seen previously this admission for dysphagia treatment, however goals met on 12/18/19 with recommendation of continuing mechanical soft diet(ground meats/chopped vegetables)/thin liquids. Patient being followed for cognitive linguistic and dysarthria treatment. Orientation:   Person  Place  Time  Situation    Pain: Pain Scale 1: Numeric (0 - 10)  Pain Intensity 1: 0     OBJECTIVE   Patient seen for dysphagia re evaluation. Patient eating wagner cracker upon entry. Slowed and disorganized prep with chewables, however adequate oral clearance. With mixed consistency,anterior spillage of liquid portion.  Initially ST providing 1: 1 assistance and small bites /slow rate with mixed consistency, however when patient given spoon, he consumed large bite resulting in overt s/sx airway compromise before the swallow. Patient demonstrated throat clearing x1 with thin liquid wash by straw following cracker. No other overt s/sx with thin liquids. Also introduced exercises for oral motor movement as patient observed to have decreased oral control and containment with chewables this date. Patient completed 5 sets of the following: labial pucker, labial retraction, lingual lateralization. ASSESSMENT   Patient presents with significant oral dysphagia and occasional s/sx pharyngeal dysphagia. Episodes of airway compromise appear related to increased rate and bite/sip size. Recommend continue mechanical soft diet(ground meats/chopped vegetables)/thin liquids. Medications crushed in puree. Small bites/sips, slow rate of intake, and alternate solids/liquids. Fully upright and alert for all po intake. Will plan for modified barium swallow study tomorrow to objectively assess pharyngeal swallow. Patient is not appropriate for chewable advancement due ongoing oral impairments increasing risk of airway compromise.             Tool Used: Dysphagia Outcome and Severity Scale (ROCHELLE)    Score Comments   Normal Diet  [] 7 With no strategies or extra time needed   Functional Swallow  [] 6 May have mild oral or pharyngeal delay   Mild Dysphagia  [] 5 Which may require one diet consistency restricted    Mild-Moderate Dysphagia  [] 4 With 1-2 diet consistencies restricted   Moderate Dysphagia  [] 3 With 2 or more diet consistencies restricted   Moderate-Severe Dysphagia  [] 2 With partial PO strategies (trials with ST only)   Severe Dysphagia  [] 1 With inability to tolerate any PO safely      Score:  Initial:4 Most Recent: 4 (Date 01/07/20 )   Interpretation of Tool: The Dysphagia Outcome and Severity Scale (ROCHELLE) is a simple, easy-to-use, 7-point scale developed to systematically rate the functional severity of dysphagia based on objective assessment and make recommendations for diet level, independence level, and type of nutrition. PLAN    FREQUENCY/DURATION: Continue to follow patient 3 times a week for duration of hospital stay to address above goals. - Recommendations for next treatment session: Next treatment will address modified barium swallow study     REHABILITATION POTENTIAL FOR STATED GOALS: Good     COMPLIANCE WITH PROGRAM/EXERCISES: Will assess as treatment progresses    CONTINUATION OF SKILLED SERVICES/MEDICAL NECESSITY:   Patient is expected to demonstrate progress in  swallow strength, swallow timeliness, swallow function, diet tolerance and swallow safety in order to  improve swallow safety, work toward diet advancement and decrease aspiration risk.  Patient continues to require skilled intervention due to dysphagia. RECOMMENDATIONS   DIET:    PO:  Mechanical soft with ground meat/chopped vegatables   Liquids:  regular thin    MEDICATIONS: Crushed in puree     ASPIRATION PRECAUTIONS  · Slow rate of intake  · Small bites/sips  · Upright at 90 degrees during meal     COMPENSATORY STRATEGIES/MODIFICATIONS  · Alternate liquids/solids  · Small sips and bites  · Slow rate     EDUCATION:  · Recommendations discussed with Nursing  · Patient     RECOMMENDATIONS for CONTINUED SPEECH THERAPY: YES: Anticipate need for ongoing speech therapy during this hospitalization and at next level of care.          SAFETY:  After treatment position/precautions:  · Upright in bed  · Call light within reach  · Hospitalist notified    Total Treatment Duration:   Time In: 8821  Time Out: [id number] [facility] [address] [facility]

## 2020-01-08 ENCOUNTER — APPOINTMENT (OUTPATIENT)
Dept: GENERAL RADIOLOGY | Age: 45
DRG: 045 | End: 2020-01-08
Attending: HOSPITALIST
Payer: MEDICAID

## 2020-01-08 LAB
GLUCOSE BLD STRIP.AUTO-MCNC: 100 MG/DL (ref 65–100)
GLUCOSE BLD STRIP.AUTO-MCNC: 102 MG/DL (ref 65–100)
GLUCOSE BLD STRIP.AUTO-MCNC: 95 MG/DL (ref 65–100)
GLUCOSE BLD STRIP.AUTO-MCNC: 97 MG/DL (ref 65–100)

## 2020-01-08 PROCEDURE — 97110 THERAPEUTIC EXERCISES: CPT

## 2020-01-08 PROCEDURE — 74011000255 HC RX REV CODE- 255: Performed by: HOSPITALIST

## 2020-01-08 PROCEDURE — 65660000000 HC RM CCU STEPDOWN

## 2020-01-08 PROCEDURE — 74011250636 HC RX REV CODE- 250/636: Performed by: INTERNAL MEDICINE

## 2020-01-08 PROCEDURE — 92610 EVALUATE SWALLOWING FUNCTION: CPT

## 2020-01-08 PROCEDURE — 97112 NEUROMUSCULAR REEDUCATION: CPT

## 2020-01-08 PROCEDURE — 74230 X-RAY XM SWLNG FUNCJ C+: CPT

## 2020-01-08 PROCEDURE — 74011250637 HC RX REV CODE- 250/637: Performed by: HOSPITALIST

## 2020-01-08 PROCEDURE — 82962 GLUCOSE BLOOD TEST: CPT

## 2020-01-08 PROCEDURE — 65270000029 HC RM PRIVATE

## 2020-01-08 PROCEDURE — 97530 THERAPEUTIC ACTIVITIES: CPT

## 2020-01-08 PROCEDURE — 74011250637 HC RX REV CODE- 250/637: Performed by: INTERNAL MEDICINE

## 2020-01-08 PROCEDURE — 74011636637 HC RX REV CODE- 636/637: Performed by: INTERNAL MEDICINE

## 2020-01-08 RX ADMIN — BARIUM SULFATE 15 ML: 400 PASTE ORAL at 10:23

## 2020-01-08 RX ADMIN — LISINOPRIL 40 MG: 20 TABLET ORAL at 08:38

## 2020-01-08 RX ADMIN — INSULIN GLARGINE 10 UNITS: 100 INJECTION, SOLUTION SUBCUTANEOUS at 21:39

## 2020-01-08 RX ADMIN — ACETAMINOPHEN 650 MG: 325 TABLET, FILM COATED ORAL at 16:18

## 2020-01-08 RX ADMIN — METOPROLOL SUCCINATE 25 MG: 25 TABLET, FILM COATED, EXTENDED RELEASE ORAL at 08:38

## 2020-01-08 RX ADMIN — Medication 10 ML: at 21:09

## 2020-01-08 RX ADMIN — Medication 10 ML: at 15:49

## 2020-01-08 RX ADMIN — ASPIRIN 81 MG 81 MG: 81 TABLET ORAL at 08:39

## 2020-01-08 RX ADMIN — ENOXAPARIN SODIUM 40 MG: 40 INJECTION SUBCUTANEOUS at 15:48

## 2020-01-08 RX ADMIN — ATORVASTATIN CALCIUM 80 MG: 80 TABLET, FILM COATED ORAL at 21:09

## 2020-01-08 RX ADMIN — BARIUM SULFATE 45 ML: 980 POWDER, FOR SUSPENSION ORAL at 10:23

## 2020-01-08 RX ADMIN — GUAIFENESIN 100 MG: 100 SOLUTION ORAL at 16:18

## 2020-01-08 NOTE — PROGRESS NOTES
01/07/20 1915   NIH Stroke Scale   Interval Other (comment)   LOC 0   LOC Questions 0   LOC Commands 0   Best Gaze 0   Visual 0   Facial Palsy 2   Motor Right Arm 0   Motor Left Arm 4   Motor Right Leg 0   Motor Left Leg 4   Limb Ataxia 0   Sensory 0   Best Language 1   Dysarthria 0   Extinction and Inattention 0   Total 11   NIH at bedside Marion Nguyen

## 2020-01-08 NOTE — PROGRESS NOTES
01/08/20 0717   NIH Stroke Scale   Interval Other (comment)  (dual shift change, Toby Sherwood RN)   LOC 0   LOC Questions 0   LOC Commands 0   Best Gaze 0   Visual 0   Facial Palsy 2   Motor Right Arm 0   Motor Left Arm 4   Motor Right Leg 0   Motor Left Leg 4   Limb Ataxia 0   Sensory 0   Best Language 1   Dysarthria 0   Extinction and Inattention 0   Total 11

## 2020-01-08 NOTE — PROGRESS NOTES
Problem: Self Care Deficits Care Plan (Adult)  Goal: *Acute Goals and Plan of Care (Insert Text)  Description  1. Patient will complete sitting balance edge of bed with ADL tasks/OT activity with supervision. 2. Patient will demonstrate appropriate safety awareness and protection of L UE during bed mobility and functional transfers with minimal cues. 3. Patient will complete upper body bathing/dressing with minimal assistance to improve participation with ADL. 4. Patient will complete weightbearing into the L UE with ADL tasks with minimal assistance to improve ability to use as a functional assist during ADL tasks. 5. Patient will participation in 8 minutes of therapeutic exercises with moderate assistance to improve strength in the L UE for ADL/functional transfers. 6. Patient will attempt transfer to the chair/BSC within 3 visits to demonstrate advancement with functional transfers. Timeframe: 7 visits   Outcome: Progressing Towards Goal     OCCUPATIONAL THERAPY: Daily Note and PM    1/8/2020  INPATIENT: OT Visit Days: 6  Payor: MEDICAID PENDING / Plan: [employer] PENDING / Product Type: Medicaid /      NAME/AGE/GENDER: Dora Neal is a 53 y.o. male   PRIMARY DIAGNOSIS:  Acute ischemic stroke ([address] [I63.9]  Cerebrovascular accident (CVA) due to embolism ([address] [F69.1] Acute embolic stroke ([address] Acute embolic stroke ([address]       ICD-10: Treatment Diagnosis:    · Generalized Muscle Weakness (M62.81)  · Other lack of cordination (R27.8)  · Hemiplegia and hemiparesis following cerebral infarction affecting   · left non-dominant side (I69.354)  · Abnormal posture (R29.3)   Precautions/Allergies:     Patient has no known allergies. ASSESSMENT:     Mr. Anjel Gil presents to the hospital with L sided weakness and acute ischemic CVA. MRI revealed acute to subacute L cerebellar and R paramedian yariel infarcts.    1/8/2020 Pt presents in supine upon arrival and transferred to sitting with min a and additional time. Pt sat edge of bed working on increasing balance, lateral weight shifting, and postural alignment. Pt tolerated PROM to his LUE indicating pain past 90 degrees of shoulder flexion. Pt completed several reps of sit to stand transfers with min a to get to standing and mod/max a to maintain midline. Pt continues to make progress toward goals. Continue POC. This section established at most recent assessment   PROBLEM LIST (Impairments causing functional limitations):  1. Decreased Strength  2. Decreased ADL/Functional Activities  3. Decreased Transfer Abilities  4. Decreased Ambulation Ability/Technique  5. Decreased Balance  6. Decreased Activity Tolerance  7. Increased Fatigue  8. Decreased Flexibility/Joint Mobility  9. Decreased Knowledge of Precautions  10. Decreased [place] with Home Exercise Program  11. Decreased Cognition   INTERVENTIONS PLANNED: (Benefits and precautions of occupational therapy have been discussed with the patient.)  1. Activities of daily living training  2. Adaptive equipment training  3. Balance training  4. Clothing management  5. Cognitive training  6. Donning&doffing training  7. Keanu tech training  8. Neuromuscular re-eduation  9. Therapeutic activity  10. Therapeutic exercise     TREATMENT PLAN: Frequency/Duration: Follow patient 3 times per week to address above goals. Rehabilitation Potential For Stated Goals: Excellent     REHAB RECOMMENDATIONS (at time of discharge pending progress):    Placement: It is my opinion, based on this patient's performance to date, that Mr. Romina Orlando may benefit from intensive therapy at an [address] [place] [address] after discharge due to potential to make ongoing and sustainable functional improvement that is of practical value. .  Equipment:    TBD               OCCUPATIONAL PROFILE AND HISTORY:   History of Present Injury/Illness (Reason for Referral):  See H&P  Past Medical History/Comorbidities:   Mr. Romina Orlando  has a past medical history of Acute ischemic stroke ([facility] (12/12/2019), Acute pancreatitis (11/19/2014), Cerebrovascular accident (CVA) due to embolism ([facility] (12/12/2019), Diabetes ([address] (2002), Diabetes ([address] Diabetes mellitus, and Hypertension. He also has no past medical history of Arthritis, Asthma, Autoimmune disease ([address] CAD (coronary artery disease), Cancer ([address] Chronic kidney disease, COPD, Dementia, Dementia ([address] Heart failure ([address] Ill-defined condition, Infectious disease, Liver disease, Other ill-defined conditions(799.89), Psychiatric disorder, PUD (peptic ulcer disease), Seizures ([address] or Sleep disorder. Mr. Patty Brown  has a past surgical history that includes hx hernia repair and hx orthopaedic. Social History/Living Environment:   Home Environment: Apartment  # Steps to Enter: 12  Rails to Enter: Yes  [employer] : Bilateral  One/Two Story Residence: One story  Living Alone: No  Support Systems: Spouse/Significant Other/Partner  Patient Expects to be Discharged to[de-identified] Unknown  Current DME Used/Available at Home: None  Tub or Shower Type: Tub/Shower combination  Prior Level of Function/Work/Activity:  Pt lives at home with his wife. Pt is typically independent with ADL/functional mobility. Pt does not drive. Pt was working part-time at [employer]. Personal Factors:          Age:  53 y.o. Past/Current Experience:  CVA with flaccid L side        Other factors that influence how disability is experienced by the patient:  multiple co-morbidities    Number of Personal Factors/Comorbidities that affect the Plan of Care: Extensive review of physical, cognitive, and psychosocial performance (3+):  HIGH COMPLEXITY   ASSESSMENT OF OCCUPATIONAL PERFORMANCE[de-identified]   Activities of Daily Living:   Basic ADLs (From Assessment) Complex ADLs (From Assessment)   Feeding: Stand-by assistance  Oral Facial Hygiene/Grooming:  Moderate assistance  Bathing: Maximum assistance  Upper Body Dressing: Maximum assistance  Lower Body Dressing: Total assistance  Toileting: Total assistance Instrumental ADL  Meal Preparation: Total assistance  Homemaking: Total assistance   Grooming/Bathing/Dressing Activities of Daily Living     Cognitive Retraining  Safety/Judgement: Fall prevention                       Bed/Mat Mobility  Rolling: Minimum assistance  Supine to Sit: Minimum assistance; Moderate assistance  Sit to Supine: Moderate assistance  Sit to Stand: Minimum assistance;Assist x2  Stand to Sit: Minimum assistance;Assist x2     Most Recent Physical Functioning:   Gross Assessment:                  Posture:     Balance:  Sitting: Impaired  Sitting - Static: Good (unsupported)  Sitting - Dynamic: Fair (occasional)  Standing: Impaired  Standing - Static: Fair  Standing - Dynamic : Poor Bed Mobility:  Rolling: Minimum assistance  Supine to Sit: Minimum assistance; Moderate assistance  Sit to Supine: Moderate assistance  Wheelchair Mobility:     Transfers:  Sit to Stand: Minimum assistance;Assist x2  Stand to Sit: Minimum assistance;Assist x2            Patient Vitals for the past 6 hrs:   BP BP Patient Position SpO2 Pulse   20 1159 142/87 At rest 93 % 64       Mental Status  Neurologic State: Alert  Orientation Level: Oriented to person  Cognition: Follows commands  Perception: Appears intact  Perseveration: No perseveration noted  Safety/Judgement: Fall prevention                          Physical Skills Involved:  1. Range of Motion  2. Balance  3. Strength  4. Activity Tolerance  5. Fine Motor Control  6. Gross Motor Control Cognitive Skills Affected (resulting in the inability to perform in a timely and safe manner):  1. Perception  2. Expression Psychosocial Skills Affected:  1. Habits/Routines  2.  Self-Awareness   Number of elements that affect the Plan of Care: 5+:  HIGH COMPLEXITY   CLINICAL DECISION MAKIN [facility] [address] [id number] AM-PAC 6 Clicks   Daily Activity Inpatient Short Form  How much help from another person does the patient currently need. .. Total A Lot A Little None   1. Putting on and taking off regular lower body clothing? [x] 1   [] 2   [] 3   [] 4   2. Bathing (including washing, rinsing, drying)? [] 1   [x] 2   [] 3   [] 4   3. Toileting, which includes using toilet, bedpan or urinal?   [x] 1   [] 2   [] 3   [] 4   4. Putting on and taking off regular upper body clothing? [] 1   [x] 2   [] 3   [] 4   5. Taking care of personal grooming such as brushing teeth? [] 1   [x] 2   [] 3   [] 4   6. Eating meals? [] 1   [] 2   [x] 3   [] 4   © 2007, Trustees of [facility] [employer], under license to [employer]. All rights reserved      Score:  Initial: 11 Most Recent: X (Date: -- )    Interpretation of Tool:  Represents activities that are increasingly more difficult (i.e. Bed mobility, Transfers, Gait). Medical Necessity:     · Patient demonstrates   · good and excellent  ·  rehab potential due to higher previous functional level. Reason for Services/Other Comments:  · Patient continues to require skilled intervention due to   · Decreased independence with ADL/functional transfers that impacts overall quality of life. · .   Use of outcome tool(s) and clinical judgement create a POC that gives a: MODERATE COMPLEXITY         TREATMENT:   (In addition to Assessment/Re-Assessment sessions the following treatments were rendered)     Pre-treatment Symptoms/Complaints:    Pain: Initial:   Pain Intensity 1: 0  Pain Location 1: Shoulder  Pain Orientation 1: Left  Pain Intervention(s) 1: Exercise, Repositioned  Post Session:  0     Neuromuscular Re-education: (15 minutes):  Exercise/activities per grid below to improve balance, coordination, kinesthetic sense, posture and proprioception. Required moderate visual, verbal and manual cues to promote static and dynamic balance in standing.       LLE Re-Education  Other Activities: lateral weight bearing through his L UE to increase biofeedback and proprioceptionTrunk Re-Education  Muscle Facilitation: lateral weight bearing through his L UE to increase biofeedback and proprioception  Muscle Inhibition: trunk flexion and left lateral lean  Integration Activities: R UE to increase midline  Other Activities: weight shifting in standing    Therapeutic Activity: (15 minutes): Therapeutic activities including Bed transfers, sit to stand transfers and static standing to improve mobility, strength, balance and coordination. Required moderate assist to promote static balance in standing. Therapeutic Exercise: (8 minutes):  Exercises per grid below to improve mobility and strength. Required maximal manual cues to complete PROM. All PROM in LUE Date:  1/8/2020 Date:   Date:     Activity/Exercise Parameters Parameters Parameters   Shoulder flexion 10 reps     Shoulder int/ext rotation 10 reps     Shoulder horizontal add/abd 10 reps     Elbow flexion/extension 10 reps                                                       Date:  12/15 Date:   Date:     Activity/Exercise Parameters Parameters Parameters   Shifting weight side to side and weightbearing into L UE 10 reps      Trunk flexion   5 reps      Trunk extension to upright posture 5 reps      Dynamic reaching to the L 15 reps      Identifying and visually attending to L side 6 reps                      Braces/Orthotics/Lines/Etc:   ·  None  Treatment/Session Assessment:    · Response to Treatment:  Pt demonstrated good participation. · Interdisciplinary Collaboration:   o Certified Occupational Therapy Assistant  o Registered Nurse  o Rehabilitation Attendant  · After treatment position/precautions:   o Up in chair  o Bed alarm/tab alert on  o Bed/Chair-wheels locked  o Call light within reach  o RN notified   · Compliance with Program/Exercises: Will assess as treatment progresses. · Recommendations/Intent for next treatment session:   \"Next visit will focus on advancements to more challenging activities and reduction in assistance provided\".   Total Treatment Duration:  OT Patient Time In/Time Out  Time In: 1440  Time Out: [id number] [facility] Lacy Spivey

## 2020-01-08 NOTE — PROGRESS NOTES
Problem: Patient Education: Go to Patient Education Activity  Goal: Patient/Family Education  Outcome: Progressing Towards Goal     Problem: Pressure Injury - Risk of  Goal: *Prevention of pressure injury  Description  Document Dewey Scale and appropriate interventions in the flowsheet. Outcome: Progressing Towards Goal  Note: Pressure Injury Interventions:  Sensory Interventions: Assess changes in LOC, Avoid rigorous massage over bony prominences, Keep linens dry and wrinkle-free, Float heels, Minimize linen layers, Pressure redistribution bed/mattress (bed type), Turn and reposition approx. every two hours (pillows and wedges if needed)    Moisture Interventions: Absorbent underpads, Apply protective barrier, creams and emollients, Check for incontinence Q2 hours and as needed, Limit adult briefs, Maintain skin hydration (lotion/cream), Minimize layers, Moisture barrier, Offer toileting Q_hr    Activity Interventions: Increase time out of bed, Pressure redistribution bed/mattress(bed type), PT/OT evaluation    Mobility Interventions: HOB 30 degrees or less, Pressure redistribution bed/mattress (bed type), PT/OT evaluation    Nutrition Interventions: Document food/fluid/supplement intake    Friction and Shear Interventions: Apply protective barrier, creams and emollients, Foam dressings/transparent film/skin sealants, Lift sheet, Minimize layers                Problem: Patient Education: Go to Patient Education Activity  Goal: Patient/Family Education  Outcome: Progressing Towards Goal     Problem: Falls - Risk of  Goal: *Absence of Falls  Description  Document Wilton Tobias Fall Risk and appropriate interventions in the flowsheet.   Outcome: Progressing Towards Goal  Note: Fall Risk Interventions:  Mobility Interventions: Bed/chair exit alarm, Communicate number of staff needed for ambulation/transfer, Patient to call before getting OOB    Mentation Interventions: Bed/chair exit alarm    Medication Interventions: Bed/chair exit alarm, Patient to call before getting OOB    Elimination Interventions: Bed/chair exit alarm, Call light in reach, Patient to call for help with toileting needs    History of Falls Interventions: Bed/chair exit alarm, Door open when patient unattended         Problem: Patient Education: Go to Patient Education Activity  Goal: Patient/Family Education  Outcome: Progressing Towards Goal     Problem: Diabetes Self-Management  Goal: *Disease process and treatment process  Description  Define diabetes and identify own type of diabetes; list 3 options for treating diabetes. Outcome: Progressing Towards Goal  Goal: *Incorporating nutritional management into lifestyle  Description  Describe effect of type, amount and timing of food on blood glucose; list 3 methods for planning meals. Outcome: Progressing Towards Goal  Goal: *Incorporating physical activity into lifestyle  Description  State effect of exercise on blood glucose levels. Outcome: Progressing Towards Goal  Goal: *Developing strategies to promote health/change behavior  Description  Define the ABC's of diabetes; identify appropriate screenings, schedule and personal plan for screenings. Outcome: Progressing Towards Goal  Goal: *Using medications safely  Description  State effect of diabetes medications on diabetes; name diabetes medication taking, action and side effects. Outcome: Progressing Towards Goal  Goal: *Monitoring blood glucose, interpreting and using results  Description  Identify recommended blood glucose targets  and personal targets. Outcome: Progressing Towards Goal  Goal: *Prevention, detection, treatment of acute complications  Description  List symptoms of hyper- and hypoglycemia; describe how to treat low blood sugar and actions for lowering  high blood glucose level.   Outcome: Progressing Towards Goal  Goal: *Prevention, detection and treatment of chronic complications  Description  Define the natural course of diabetes and describe the relationship of blood glucose levels to long term complications of diabetes.   Outcome: Progressing Towards Goal  Goal: *Developing strategies to address psychosocial issues  Description  Describe feelings about living with diabetes; identify support needed and support network  Outcome: Progressing Towards Goal     Problem: Patient Education: Go to Patient Education Activity  Goal: Patient/Family Education  Outcome: Progressing Towards Goal     Problem: General Medical Care Plan  Goal: *Vital signs within specified parameters  Outcome: Progressing Towards Goal  Goal: *Labs within defined limits  Outcome: Progressing Towards Goal  Goal: *Absence of infection signs and symptoms  Description  Wash hand more often   Outcome: Progressing Towards Goal  Goal: *Skin integrity maintained  Outcome: Progressing Towards Goal  Goal: *Fluid volume balance  Outcome: Progressing Towards Goal  Goal: *Optimize nutritional status  Outcome: Progressing Towards Goal  Goal: *Anxiety reduced or absent  Outcome: Progressing Towards Goal  Goal: *Progressive mobility and function (eg: ADL's)  Outcome: Progressing Towards Goal     Problem: Patient Education: Go to Patient Education Activity  Goal: Patient/Family Education  Outcome: Progressing Towards Goal     Problem: Patient Education: Go to Patient Education Activity  Goal: Patient/Family Education  Outcome: Progressing Towards Goal     Problem: Patient Education: Go to Patient Education Activity  Goal: Patient/Family Education  Outcome: Progressing Towards Goal     Problem: Pain  Goal: *Control of Pain  Outcome: Progressing Towards Goal     Problem: Patient Education: Go to Patient Education Activity  Goal: Patient/Family Education  Outcome: Progressing Towards Goal

## 2020-01-08 NOTE — PROGRESS NOTES
Hospitalist Progress Note     Admit Date:  2019  9:07 AM   Name:  Hazel Wadlron   Age:  53 y.o.  :  1975   MRN:  [id number]   PCP:  Nava Bazzi MD      Subjective:     52 y/o smoker with DM, HTN, who presented to ER  with L leg and arm weakness, L facial droop, dysarthria. First noted L leg weakness late night  when he woke up to go to the bathroom. He was normal when he went to bed around 1030. EMS called. Noted to have slurred speech and L facial droop as well. Code S called in ER around 9am.  MRI with acute infarcts in L cerebellar hemisphere, deep frontoparietal white matter, and R paramedian yariel. Also noted old lacunar infarcts. No large vessel occlusion on CTA, but some stenosis noted. He was started on asa, statins. Neurology consulted. An ECHO showed PFO. Plan for cardiology referral and evaluate for closure as outpatient. PT/OT suggested inpatient rehabilitation. The patient is uninsured and the only option at this moment is [facility]. Today, L weakness unchanged, no ac events, no significant resp spx    Objective:     Patient Vitals for the past 24 hrs:   Temp Pulse Resp BP SpO2   20 1159 97.9 °F (36.6 °C) 64 18 142/87 93 %   20 0800 98 °F (36.7 °C) 93 17 111/77 95 %   20 0400 97.7 °F (36.5 °C) 84 18 114/84 98 %   20 0000 97.7 °F (36.5 °C) 67 18 138/87 100 %   20 2000 98.1 °F (36.7 °C) (!) 56 18 111/74 97 %     Oxygen Therapy  O2 Sat (%): 93 % (20 1159)  Pulse via Oximetry: 56 beats per minute (20)  O2 Device: Room air (20)  No intake or output data in the 24 hours ending 20 1620      REVIEW OF SYSTEMS: Comprehensive ROS performed and negative except as stated in HPI. Physical Examination:  General:          Well nourished. Alert and oriented. Mild distress  CV:                  RRR. No murmur, rub, or gallop. Lungs:             Clear to auscultation bilaterally.   No wheezing, rhonchi, or rales  Extremities:     Warm and dry. No cyanosis or edema. Neurologic:      CN II-XII intact except for mild L facial droop. Sensation intact. PERRLA.  dysarthria. No                          confusion. STR o/5 LUE and LLE. Dysarthria noted. Skin:                No rashes or jaundice. No wounds. Psych:             Appears depressed    Data Review:  I have reviewed all labs, meds, telemetry events, and studies from the last 24 hours.     Recent Results (from the past 24 hour(s))   GLUCOSE, POC    Collection Time: 01/07/20  8:56 PM   Result Value Ref Range    Glucose (POC) 83 65 - 100 mg/dL   GLUCOSE, POC    Collection Time: 01/08/20  7:14 AM   Result Value Ref Range    Glucose (POC) 97 65 - 100 mg/dL   GLUCOSE, POC    Collection Time: 01/08/20 11:08 AM   Result Value Ref Range    Glucose (POC) 100 65 - 100 mg/dL        All Micro Results     None          Current Meds:  Current Facility-Administered Medications   Medication Dose Route Frequency    metoprolol succinate (TOPROL-XL) XL tablet 25 mg  25 mg Oral DAILY    polyethylene glycol (MIRALAX) packet 17 g  17 g Oral DAILY PRN    insulin glargine (LANTUS) injection 10 Units  10 Units SubCUTAneous QHS    guaiFENesin (ROBITUSSIN) 100 mg/5 mL oral liquid 100 mg  100 mg Oral Q4H PRN    hydrALAZINE (APRESOLINE) 20 mg/mL injection 20 mg  20 mg IntraVENous Q4H PRN    atorvastatin (LIPITOR) tablet 80 mg  80 mg Oral QHS    lisinopril (PRINIVIL, ZESTRIL) tablet 40 mg  40 mg Oral DAILY    sodium chloride (NS) flush 5-40 mL  5-40 mL IntraVENous Q8H    sodium chloride (NS) flush 5-40 mL  5-40 mL IntraVENous PRN    ondansetron (ZOFRAN) injection 4 mg  4 mg IntraVENous Q4H PRN    aspirin chewable tablet 81 mg  81 mg Oral DAILY    acetaminophen (TYLENOL) tablet 650 mg  650 mg Oral Q4H PRN    enoxaparin (LOVENOX) injection 40 mg  40 mg SubCUTAneous Q24H    insulin lispro (HUMALOG) injection   SubCUTAneous AC&HS    dextrose 40% (GLUTOSE) oral gel 1 Tube  15 g Oral PRN    glucagon (GLUCAGEN) injection 1 mg  1 mg IntraMUSCular PRN    dextrose (D50W) injection syrg 12.5-25 g  25-50 mL IntraVENous PRN       Diet:  DIET NUTRITIONAL SUPPLEMENTS  DIET CARDIAC    Other Studies (last 24 hours):  Xr Swallow Func Video    Result Date: 1/8/2020  MODIFIED BARIUM SWALLOW ESOPHAGRAM 1/8/2020 HISTORY: Stroke. Dysphasia. TECHNIQUE: 1.5 minutes of fluoroscopy time was utilized. 0 spot fluoroscopic images were saved. The patient ingested various consistencies of barium under direct fluoroscopic observation. FINDINGS: There was aspiration of a small quantity of thin liquids during straw sips. The patient ingested liquid consistencies of barium without difficulty. IMPRESSION: Small quantity aspiration with straw sips of thin liquids. Assessment and Plan:     Hospital Problems as of 1/8/2020 Date Reviewed: 3/3/2017          Codes Class Noted - Resolved POA    PFO (patent foramen ovale) ICD-10-CM: Q21.1  ICD-9-CM: 745.5  12/17/2019 - Present Yes        Arrhythmia ICD-10-CM: I49.9  ICD-9-CM: 427.9  12/15/2019 - Present Yes        Hyperlipemia ICD-10-CM: E78.5  ICD-9-CM: 272.4  12/15/2019 - Present Yes        * (Principal) Acute embolic stroke [facility]-WC: I63.9  ICD-9-CM: 434.11  12/12/2019 - Present Yes        Hypertension (Chronic) ICD-10-CM: I10  ICD-9-CM: 401.9  Unknown - Present Yes        Type 2 diabetes mellitus (HCC) (Chronic) ICD-10-CM: E11.9  ICD-9-CM: 250.00  Unknown - Present Yes              A/P:    Acute embolic stroke  Continues to have left sided neglect  Evaluated by cardiology and neurology  -MRI head: with acute infarcts in L cerebellar hemisphere, deep frontoparietal white matter, and R paramedian yariel. Also noted old lacunar infarcts.    -CTA head: No large vessel occlusion   -ISMAEL: 3 mm PFO     Plan:  -Cont statin, ASA  -PT/OT/ST  -Needs outpatient cardiology follow-up for event monitor and PFO closure.      Active Problems:     Hypertension  BP well controlled at this time  Episodes of bradycardia noted     Plan:  -Continue metoprolol; will adjust dose if repeat episodes of bradycardia  -Continue amlodipine     Type 2 diabetes mellitus - cont on Lantus and SSI     Arrhythmia - had brief wide complex tachycardia early in admission, will need event monitor as outpt per cards     Hyperlipemia - statin therapy     PFO (patent foramen ovale) - will need closure as outpatient per cardiology     Dysphagia: diet per ST    Dispo; AWAITING  medicaid approval and placement

## 2020-01-09 LAB
GLUCOSE BLD STRIP.AUTO-MCNC: 100 MG/DL (ref 65–100)
GLUCOSE BLD STRIP.AUTO-MCNC: 117 MG/DL (ref 65–100)
GLUCOSE BLD STRIP.AUTO-MCNC: 92 MG/DL (ref 65–100)

## 2020-01-09 PROCEDURE — 82962 GLUCOSE BLOOD TEST: CPT

## 2020-01-09 PROCEDURE — 74011250637 HC RX REV CODE- 250/637: Performed by: INTERNAL MEDICINE

## 2020-01-09 PROCEDURE — 65660000000 HC RM CCU STEPDOWN

## 2020-01-09 PROCEDURE — 65270000029 HC RM PRIVATE

## 2020-01-09 PROCEDURE — 74011250636 HC RX REV CODE- 250/636: Performed by: INTERNAL MEDICINE

## 2020-01-09 PROCEDURE — 74011250637 HC RX REV CODE- 250/637: Performed by: HOSPITALIST

## 2020-01-09 PROCEDURE — 92507 TX SP LANG VOICE COMM INDIV: CPT

## 2020-01-09 PROCEDURE — 74011636637 HC RX REV CODE- 636/637: Performed by: INTERNAL MEDICINE

## 2020-01-09 PROCEDURE — 92526 ORAL FUNCTION THERAPY: CPT

## 2020-01-09 RX ADMIN — METOPROLOL SUCCINATE 25 MG: 25 TABLET, FILM COATED, EXTENDED RELEASE ORAL at 09:27

## 2020-01-09 RX ADMIN — GUAIFENESIN 100 MG: 100 SOLUTION ORAL at 17:19

## 2020-01-09 RX ADMIN — Medication 5 ML: at 22:01

## 2020-01-09 RX ADMIN — ACETAMINOPHEN 650 MG: 325 TABLET, FILM COATED ORAL at 09:27

## 2020-01-09 RX ADMIN — POLYETHYLENE GLYCOL 3350 17 G: 17 POWDER, FOR SOLUTION ORAL at 09:27

## 2020-01-09 RX ADMIN — ACETAMINOPHEN 650 MG: 325 TABLET, FILM COATED ORAL at 17:19

## 2020-01-09 RX ADMIN — LISINOPRIL 40 MG: 20 TABLET ORAL at 09:27

## 2020-01-09 RX ADMIN — Medication 5 ML: at 14:51

## 2020-01-09 RX ADMIN — ATORVASTATIN CALCIUM 80 MG: 80 TABLET, FILM COATED ORAL at 22:01

## 2020-01-09 RX ADMIN — GUAIFENESIN 100 MG: 100 SOLUTION ORAL at 09:27

## 2020-01-09 RX ADMIN — INSULIN GLARGINE 10 UNITS: 100 INJECTION, SOLUTION SUBCUTANEOUS at 22:00

## 2020-01-09 RX ADMIN — Medication 5 ML: at 06:25

## 2020-01-09 RX ADMIN — ENOXAPARIN SODIUM 40 MG: 40 INJECTION SUBCUTANEOUS at 14:50

## 2020-01-09 RX ADMIN — ASPIRIN 81 MG 81 MG: 81 TABLET ORAL at 09:27

## 2020-01-09 NOTE — PROGRESS NOTES
01/09/20 0705   NIH Stroke Scale   Interval Other (comment)   LOC 0   LOC Questions 0   LOC Commands 0   Best Gaze 0   Visual 0   Facial Palsy 1   Motor Right Arm 0   Motor Left Arm 4   Motor Right Leg 0   Motor Left Leg 4   Limb Ataxia 0   Sensory 0   Best Language 1   Dysarthria 1   Extinction and Inattention 0   Total 11   dual nih with Al RN

## 2020-01-09 NOTE — PROGRESS NOTES
Hospitalist Progress Note     Admit Date:  2019  9:07 AM   Name:  Tammy Montes   Age:  53 y.o.  :  1975   MRN:  [id number]   PCP:  Delwin Cooks, MD      Subjective:     52 y/o smoker with DM, HTN, who presented to ER  with L leg and arm weakness, L facial droop, dysarthria. First noted L leg weakness late night  when he woke up to go to the bathroom. He was normal when he went to bed around 1030. EMS called. Noted to have slurred speech and L facial droop as well. Code S called in ER around 9am.  MRI with acute infarcts in L cerebellar hemisphere, deep frontoparietal white matter, and R paramedian yariel. Also noted old lacunar infarcts. No large vessel occlusion on CTA, but some stenosis noted. He was started on asa, statins. Neurology consulted. An ECHO showed PFO. Plan for cardiology referral and evaluate for closure as outpatient. PT/OT suggested inpatient rehabilitation. The patient is uninsured and the only option at this moment is [facility]. Today, L weakness unchanged, no ac events, no significant resp spx, tolerates po    Objective:     Patient Vitals for the past 24 hrs:   Temp Pulse Resp BP SpO2   20 1200 98.3 °F (36.8 °C) 61 17 100/67 98 %   20 0800 97.7 °F (36.5 °C) 78 16 116/76 93 %   20 0400 97.4 °F (36.3 °C) 76 17 123/88 99 %   20 0000 97.5 °F (36.4 °C) 75 17 114/66 97 %   20 2000 98.1 °F (36.7 °C) (!) 58 17 109/72 97 %     Oxygen Therapy  O2 Sat (%): 98 % (20 1200)  Pulse via Oximetry: 56 beats per minute (20)  O2 Device: Room air (20)  No intake or output data in the 24 hours ending 20 1603      REVIEW OF SYSTEMS: Comprehensive ROS performed and negative except as stated in HPI. Physical Examination:  General:          Well nourished. Alert and oriented. Mild distress  CV:                  RRR. No murmur, rub, or gallop. Lungs:             Clear to auscultation bilaterally.   No wheezing, rhonchi, or rales  Extremities:     Warm and dry. No cyanosis or edema. Neurologic:      CN II-XII intact except for mild L facial droop. Sensation intact. PERRLA.  dysarthria. No                          confusion. STR o/5 LUE and LLE. Dysarthria noted. Skin:                No rashes or jaundice. No wounds. Psych:             Appears depressed    Data Review:  I have reviewed all labs, meds, telemetry events, and studies from the last 24 hours.     Recent Results (from the past 24 hour(s))   GLUCOSE, POC    Collection Time: 01/08/20  4:20 PM   Result Value Ref Range    Glucose (POC) 102 (H) 65 - 100 mg/dL   GLUCOSE, POC    Collection Time: 01/08/20  8:43 PM   Result Value Ref Range    Glucose (POC) 95 65 - 100 mg/dL   GLUCOSE, POC    Collection Time: 01/09/20 11:57 AM   Result Value Ref Range    Glucose (POC) 100 65 - 100 mg/dL        All Micro Results     None          Current Meds:  Current Facility-Administered Medications   Medication Dose Route Frequency    metoprolol succinate (TOPROL-XL) XL tablet 25 mg  25 mg Oral DAILY    polyethylene glycol (MIRALAX) packet 17 g  17 g Oral DAILY PRN    insulin glargine (LANTUS) injection 10 Units  10 Units SubCUTAneous QHS    guaiFENesin (ROBITUSSIN) 100 mg/5 mL oral liquid 100 mg  100 mg Oral Q4H PRN    hydrALAZINE (APRESOLINE) 20 mg/mL injection 20 mg  20 mg IntraVENous Q4H PRN    atorvastatin (LIPITOR) tablet 80 mg  80 mg Oral QHS    lisinopril (PRINIVIL, ZESTRIL) tablet 40 mg  40 mg Oral DAILY    sodium chloride (NS) flush 5-40 mL  5-40 mL IntraVENous Q8H    sodium chloride (NS) flush 5-40 mL  5-40 mL IntraVENous PRN    ondansetron (ZOFRAN) injection 4 mg  4 mg IntraVENous Q4H PRN    aspirin chewable tablet 81 mg  81 mg Oral DAILY    acetaminophen (TYLENOL) tablet 650 mg  650 mg Oral Q4H PRN    enoxaparin (LOVENOX) injection 40 mg  40 mg SubCUTAneous Q24H    insulin lispro (HUMALOG) injection   SubCUTAneous AC&HS    dextrose 40% (GLUTOSE) oral gel 1 Tube  15 g Oral PRN    glucagon (GLUCAGEN) injection 1 mg  1 mg IntraMUSCular PRN    dextrose (D50W) injection syrg 12.5-25 g  25-50 mL IntraVENous PRN       Diet:  DIET NUTRITIONAL SUPPLEMENTS  DIET DIABETIC CONSISTENT CARB    Other Studies (last 24 hours):  No results found. Assessment and Plan:     Hospital Problems as of 1/9/2020 Date Reviewed: 3/3/2017          Codes Class Noted - Resolved POA    PFO (patent foramen ovale) ICD-10-CM: Q21.1  ICD-9-CM: 745.5  12/17/2019 - Present Yes        Arrhythmia ICD-10-CM: I49.9  ICD-9-CM: 427.9  12/15/2019 - Present Yes        Hyperlipemia ICD-10-CM: E78.5  ICD-9-CM: 272.4  12/15/2019 - Present Yes        * (Principal) Acute embolic stroke [facility]) [facility]-76-CI: I63.9  ICD-9-CM: 434.11  12/12/2019 - Present Yes        Hypertension (Chronic) ICD-10-CM: I10  ICD-9-CM: 401.9  Unknown - Present Yes        Type 2 diabetes mellitus (HCC) (Chronic) ICD-10-CM: E11.9  ICD-9-CM: 250.00  Unknown - Present Yes              A/P:    Acute embolic stroke  Continues to have left sided neglect  Evaluated by cardiology and neurology  -MRI head: with acute infarcts in L cerebellar hemisphere, deep frontoparietal white matter, and R paramedian yariel. Also noted old lacunar infarcts.    -CTA head: No large vessel occlusion   -ISMAEL: 3 mm PFO     Plan:  -Cont statin, ASA  -PT/OT/ST  -Needs outpatient cardiology follow-up for event monitor and PFO closure.      Active Problems:     Hypertension  BP well controlled at this time  Episodes of bradycardia noted     Plan:  -Continue metoprolol; will adjust dose if repeat episodes of bradycardia  -Continue amlodipine     Type 2 diabetes mellitus - cont on Lantus and SSI     Arrhythmia - had brief wide complex tachycardia early in admission, will need event monitor as outpt per cards     Hyperlipemia - statin therapy     PFO (patent foramen ovale) - will need closure as outpatient per cardiology     Dysphagia: diet per ST    Dispo; AWAITING  medicaid approval and placement

## 2020-01-09 NOTE — PROGRESS NOTES
Problem: Patient Education: Go to Patient Education Activity  Goal: Patient/Family Education  Outcome: Progressing Towards Goal     Problem: Pressure Injury - Risk of  Goal: *Prevention of pressure injury  Description  Document Dewey Scale and appropriate interventions in the flowsheet. Outcome: Progressing Towards Goal  Note: Pressure Injury Interventions:  Sensory Interventions: Assess changes in LOC, Discuss PT/OT consult with provider, Float heels, Keep linens dry and wrinkle-free, Maintain/enhance activity level, Minimize linen layers, Pressure redistribution bed/mattress (bed type)    Moisture Interventions: Absorbent underpads, Check for incontinence Q2 hours and as needed, Limit adult briefs, Minimize layers    Activity Interventions: Increase time out of bed, Pressure redistribution bed/mattress(bed type), PT/OT evaluation    Mobility Interventions: HOB 30 degrees or less, Pressure redistribution bed/mattress (bed type), PT/OT evaluation    Nutrition Interventions: Document food/fluid/supplement intake    Friction and Shear Interventions: Apply protective barrier, creams and emollients, Foam dressings/transparent film/skin sealants, HOB 30 degrees or less, Lift sheet                Problem: Patient Education: Go to Patient Education Activity  Goal: Patient/Family Education  Outcome: Progressing Towards Goal     Problem: Falls - Risk of  Goal: *Absence of Falls  Description  Document Jeanne Fall Risk and appropriate interventions in the flowsheet.   Outcome: Progressing Towards Goal  Note: Fall Risk Interventions:  Mobility Interventions: Bed/chair exit alarm, Communicate number of staff needed for ambulation/transfer, OT consult for ADLs, Patient to call before getting OOB, PT Consult for mobility concerns    Mentation Interventions: Bed/chair exit alarm    Medication Interventions: Bed/chair exit alarm, Patient to call before getting OOB, Teach patient to arise slowly    Elimination Interventions: Bed/chair exit alarm, Call light in reach, Patient to call for help with toileting needs, Stay With Me (per policy), Toilet paper/wipes in reach, Toileting schedule/hourly rounds    History of Falls Interventions: Bed/chair exit alarm, Door open when patient unattended, Investigate reason for fall         Problem: Patient Education: Go to Patient Education Activity  Goal: Patient/Family Education  Outcome: Progressing Towards Goal     Problem: Diabetes Self-Management  Goal: *Disease process and treatment process  Description  Define diabetes and identify own type of diabetes; list 3 options for treating diabetes. Outcome: Progressing Towards Goal  Goal: *Incorporating nutritional management into lifestyle  Description  Describe effect of type, amount and timing of food on blood glucose; list 3 methods for planning meals. Outcome: Progressing Towards Goal  Goal: *Incorporating physical activity into lifestyle  Description  State effect of exercise on blood glucose levels. Outcome: Progressing Towards Goal  Goal: *Developing strategies to promote health/change behavior  Description  Define the ABC's of diabetes; identify appropriate screenings, schedule and personal plan for screenings. Outcome: Progressing Towards Goal  Goal: *Using medications safely  Description  State effect of diabetes medications on diabetes; name diabetes medication taking, action and side effects. Outcome: Progressing Towards Goal  Goal: *Monitoring blood glucose, interpreting and using results  Description  Identify recommended blood glucose targets  and personal targets. Outcome: Progressing Towards Goal  Goal: *Prevention, detection, treatment of acute complications  Description  List symptoms of hyper- and hypoglycemia; describe how to treat low blood sugar and actions for lowering  high blood glucose level.   Outcome: Progressing Towards Goal  Goal: *Prevention, detection and treatment of chronic complications  Description  Define the natural course of diabetes and describe the relationship of blood glucose levels to long term complications of diabetes.   Outcome: Progressing Towards Goal  Goal: *Developing strategies to address psychosocial issues  Description  Describe feelings about living with diabetes; identify support needed and support network  Outcome: Progressing Towards Goal     Problem: Patient Education: Go to Patient Education Activity  Goal: Patient/Family Education  Outcome: Progressing Towards Goal     Problem: Patient Education: Go to Patient Education Activity  Goal: Patient/Family Education  Outcome: Progressing Towards Goal     Problem: Nutrition Deficit  Goal: *Optimize nutritional status  Outcome: Progressing Towards Goal     Problem: Patient Education: Go to Patient Education Activity  Goal: Patient/Family Education  Outcome: Progressing Towards Goal     Problem: General Medical Care Plan  Goal: *Vital signs within specified parameters  Outcome: Progressing Towards Goal  Goal: *Labs within defined limits  Outcome: Progressing Towards Goal  Goal: *Absence of infection signs and symptoms  Description  Wash hand more often   Outcome: Progressing Towards Goal  Goal: *Optimal pain control at patient's stated goal  Outcome: Progressing Towards Goal  Goal: *Skin integrity maintained  Outcome: Progressing Towards Goal  Goal: *Fluid volume balance  Outcome: Progressing Towards Goal  Goal: *Optimize nutritional status  Outcome: Progressing Towards Goal  Goal: *Anxiety reduced or absent  Outcome: Progressing Towards Goal  Goal: *Progressive mobility and function (eg: ADL's)  Outcome: Progressing Towards Goal     Problem: Patient Education: Go to Patient Education Activity  Goal: Patient/Family Education  Outcome: Progressing Towards Goal     Problem: Patient Education: Go to Patient Education Activity  Goal: Patient/Family Education  Outcome: Progressing Towards Goal     Problem: Patient Education: Go to Patient Education Activity  Goal: Patient/Family Education  Outcome: Progressing Towards Goal     Problem: Patient Education: Go to Patient Education Activity  Goal: Patient/Family Education  Outcome: Progressing Towards Goal     Problem: Ischemic Stroke: Discharge Outcomes  Goal: *Verbalizes anxiety and depression are reduced or absent  Outcome: Progressing Towards Goal  Goal: *Verbalize understanding of risk factor modification(Stroke Metric)  Outcome: Progressing Towards Goal  Goal: *Hemodynamically stable  Outcome: Progressing Towards Goal  Goal: *Absence of aspiration pneumonia  Outcome: Progressing Towards Goal  Goal: *Aware of needed dietary changes  Outcome: Progressing Towards Goal  Goal: *Verbalize understanding of prescribed medications including anti-coagulants, anti-lipid, and/or anti-platelets(Stroke Metric)  Outcome: Progressing Towards Goal  Goal: *Tolerating diet  Outcome: Progressing Towards Goal  Goal: *Aware of follow-up diagnostics related to anticoagulants  Outcome: Progressing Towards Goal  Goal: *Ability to perform ADLs and demonstrates progressive mobility and function  Outcome: Progressing Towards Goal  Goal: *Absence of DVT(Stroke Metric)  Outcome: Progressing Towards Goal  Goal: *Absence of aspiration  Outcome: Progressing Towards Goal  Goal: *Optimal pain control at patient's stated goal  Outcome: Progressing Towards Goal  Goal: *Home safety concerns addressed  Outcome: Progressing Towards Goal  Goal: *Describes available resources and support systems  Outcome: Progressing Towards Goal  Goal: *Verbalizes understanding of activation of EMS(911) for stroke symptoms(Stroke Metric)  Outcome: Progressing Towards Goal  Goal: *Understands and describes signs and symptoms to report to providers(Stroke Metric)  Outcome: Progressing Towards Goal  Goal: *Neurolgocially stable (absence of additional neurological deficits)  Outcome: Progressing Towards Goal  Goal: *Verbalizes importance of follow-up with primary care physician(Stroke Metric)  Outcome: Progressing Towards Goal  Goal: *Smoking cessation discussed,if applicable(Stroke Metric)  Outcome: Progressing Towards Goal  Goal: *Depression screening completed(Stroke Metric)  Outcome: Progressing Towards Goal     Problem: Pain  Goal: *Control of Pain  Outcome: Progressing Towards Goal     Problem: Patient Education: Go to Patient Education Activity  Goal: Patient/Family Education  Outcome: Progressing Towards Goal

## 2020-01-09 NOTE — PROGRESS NOTES
01/08/20 1919   NIH Stroke Scale   Interval Other (comment)   LOC 0   LOC Questions 0   LOC Commands 0   Best Gaze 0   Visual 0   Facial Palsy 2   Motor Right Arm 0   Motor Left Arm 4   Motor Right Leg 0   Motor Left Leg 4   Limb Ataxia 0   Sensory 0   Best Language 1   Dysarthria 0   Extinction and Inattention 0   Total 11   NIH at bedside with Tamy Carreno

## 2020-01-09 NOTE — PROGRESS NOTES
Dysphagia(re evaluation 1/7/2020)  LTG: Patient will tolerate least restrictive diet without overt signs or symptoms of airway compromise. STG: Patient will tolerate mechanical soft diet(ground meats/chopped vegetables) and thin liquids by cup without overt signs or symptoms of airway compromise. STG: Patient will demonstrate use of compensatory strategies to improve swallow safety/function with 90% accuracy given minimal cueing  STG: Patient participate in exercises to improve oral motor movement with 80% accuracy given minimal cueing  STG: Patient will participate in modified barium swallow study as clinically indicated. SPEECH LANGUAGE PATHOLOGY: DYSPHAGIA and SPEECH/LANGUAGE- Daily Note 1    NAME/AGE/GENDER: Gifty Johnson is a 53 y.o. male  DATE: 1/9/2020  PRIMARY DIAGNOSIS: Acute ischemic stroke [facility]) [I63.9]  Cerebrovascular accident (CVA) due to embolism ([facility] [address] [I63.9]      ICD-10: Treatment Diagnosis: R13.12 Dysphagia, Oropharyngeal Phase    INTERDISCIPLINARY COLLABORATION: Registered Nurse and Physician  PRECAUTIONS/ALLERGIES: Patient has no known allergies. SUBJECTIVE   Patient laying toward the bottom of the bed requesting for be raised up upon arrival.  Repositioned upright. Cooperative throughout the session with oral motor, motor speech, and memory tasks. Patient being followed for cognitive linguistic and dysarthria treatment. Orientation:   Person  Place  Time  Situation    Pain: Pain Scale 1: Numeric (0 - 10)  Pain Intensity 1: 0     OBJECTIVE   Reviewed swallowing recommendations after modified barium swallow study (MBS)  yesterday including mechanical soft textures and thin liquids with no straws. Patient completed oral motor exercises x10 each focusing on improving labial seal and tone with labial pucker and labial retraction exercises.   Moderate dysarthria with min-mod cueing in structured tasks for carryover of intelligibility strategies including pacing, reduced rate, over-articulation. Significant hyponasality noted. Patient recalled 2/3 dysphagia related strategies/recommendations discussed the beginning of the session. ASSESSMENT   Recommend continue mechanical soft diet(ground meats/chopped vegetables)/thin liquids by cup only. Medications crushed in puree. Small bites/sips, slow rate of intake, and alternate solids/liquids. Fully upright and alert for all po intake. Continued therapy for oral dysphagia, dysarthria, and cognition indicated. Tool Used: Dysphagia Outcome and Severity Scale (ROCHELLE)    Score Comments   Normal Diet  [] 7 With no strategies or extra time needed   Functional Swallow  [] 6 May have mild oral or pharyngeal delay   Mild Dysphagia  [] 5 Which may require one diet consistency restricted    Mild-Moderate Dysphagia  [] 4 With 1-2 diet consistencies restricted   Moderate Dysphagia  [] 3 With 2 or more diet consistencies restricted   Moderate-Severe Dysphagia  [] 2 With partial PO strategies (trials with ST only)   Severe Dysphagia  [] 1 With inability to tolerate any PO safely      Score:  Initial:4 Most Recent: 4 (Date 01/09/20 )   Interpretation of Tool: The Dysphagia Outcome and Severity Scale (ROCHELLE) is a simple, easy-to-use, 7-point scale developed to systematically rate the functional severity of dysphagia based on objective assessment and make recommendations for diet level, independence level, and type of nutrition. PLAN    FREQUENCY/DURATION: Continue to follow patient 3 times a week for duration of hospital stay to address above goals.     - Recommendations for next treatment session: Next treatment will address dysarthria, dysphagia, cognition     REHABILITATION POTENTIAL FOR STATED GOALS: Good     COMPLIANCE WITH PROGRAM/EXERCISES: Will assess as treatment progresses    CONTINUATION OF SKILLED SERVICES/MEDICAL NECESSITY:   Patient is expected to demonstrate progress in  swallow strength, swallow timeliness, swallow function, diet tolerance and swallow safety in order to  improve swallow safety, work toward diet advancement and decrease aspiration risk.  Patient continues to require skilled intervention due to dysphagia. RECOMMENDATIONS   DIET:    PO:  Mechanical soft with ground meat/chopped vegatables   Liquids:  regular thin    MEDICATIONS: Crushed in puree     ASPIRATION PRECAUTIONS  · Slow rate of intake  · Small bites/sips  · Upright at 90 degrees during meal     COMPENSATORY STRATEGIES/MODIFICATIONS  · Alternate liquids/solids  · Small sips and bites  · Slow rate     EDUCATION:  · Recommendations discussed with Nursing  · Patient     RECOMMENDATIONS for CONTINUED SPEECH THERAPY: YES: Anticipate need for ongoing speech therapy during this hospitalization and at next level of care.          SAFETY:  After treatment position/precautions:  · Upright in bed  · Call light within reach  · Hospitalist notified    Total Treatment Duration:   Time In: 0398  Time Out: [address] [address], CCC-SLP

## 2020-01-10 LAB
GLUCOSE BLD STRIP.AUTO-MCNC: 102 MG/DL (ref 65–100)
GLUCOSE BLD STRIP.AUTO-MCNC: 108 MG/DL (ref 65–100)
GLUCOSE BLD STRIP.AUTO-MCNC: 94 MG/DL (ref 65–100)
GLUCOSE BLD STRIP.AUTO-MCNC: 95 MG/DL (ref 65–100)

## 2020-01-10 PROCEDURE — 74011250637 HC RX REV CODE- 250/637: Performed by: INTERNAL MEDICINE

## 2020-01-10 PROCEDURE — 74011636637 HC RX REV CODE- 636/637: Performed by: INTERNAL MEDICINE

## 2020-01-10 PROCEDURE — 74011250637 HC RX REV CODE- 250/637: Performed by: HOSPITALIST

## 2020-01-10 PROCEDURE — 97530 THERAPEUTIC ACTIVITIES: CPT

## 2020-01-10 PROCEDURE — 82962 GLUCOSE BLOOD TEST: CPT

## 2020-01-10 PROCEDURE — 74011250636 HC RX REV CODE- 250/636: Performed by: INTERNAL MEDICINE

## 2020-01-10 PROCEDURE — 65660000000 HC RM CCU STEPDOWN

## 2020-01-10 PROCEDURE — 65270000029 HC RM PRIVATE

## 2020-01-10 RX ORDER — INSULIN LISPRO 100 [IU]/ML
INJECTION, SOLUTION INTRAVENOUS; SUBCUTANEOUS
Status: DISCONTINUED | OUTPATIENT
Start: 2020-01-10 | End: 2020-01-18

## 2020-01-10 RX ADMIN — ENOXAPARIN SODIUM 40 MG: 40 INJECTION SUBCUTANEOUS at 14:25

## 2020-01-10 RX ADMIN — GUAIFENESIN 100 MG: 100 SOLUTION ORAL at 07:43

## 2020-01-10 RX ADMIN — LISINOPRIL 40 MG: 20 TABLET ORAL at 07:44

## 2020-01-10 RX ADMIN — Medication 5 ML: at 21:32

## 2020-01-10 RX ADMIN — ASPIRIN 81 MG 81 MG: 81 TABLET ORAL at 07:44

## 2020-01-10 RX ADMIN — METOPROLOL SUCCINATE 25 MG: 25 TABLET, FILM COATED, EXTENDED RELEASE ORAL at 07:45

## 2020-01-10 RX ADMIN — ACETAMINOPHEN 650 MG: 325 TABLET, FILM COATED ORAL at 07:43

## 2020-01-10 RX ADMIN — ATORVASTATIN CALCIUM 80 MG: 80 TABLET, FILM COATED ORAL at 21:31

## 2020-01-10 RX ADMIN — INSULIN GLARGINE 10 UNITS: 100 INJECTION, SOLUTION SUBCUTANEOUS at 21:31

## 2020-01-10 RX ADMIN — GUAIFENESIN 100 MG: 100 SOLUTION ORAL at 18:06

## 2020-01-10 RX ADMIN — Medication 5 ML: at 14:26

## 2020-01-10 RX ADMIN — ACETAMINOPHEN 650 MG: 325 TABLET, FILM COATED ORAL at 18:06

## 2020-01-10 RX ADMIN — Medication 5 ML: at 06:12

## 2020-01-10 NOTE — PROGRESS NOTES
01/10/20 0657   NIH Stroke Scale   Interval Other (comment)   LOC 0   LOC Questions 0   LOC Commands 0   Best Gaze 0   Visual 0   Facial Palsy 1   Motor Right Arm 0   Motor Left Arm 4   Motor Right Leg 0   Motor Left Leg 4   Limb Ataxia 0   Sensory 0   Best Language 1   Dysarthria 1   Extinction and Inattention 0   Total 11   dual nih with Al RN

## 2020-01-10 NOTE — PROGRESS NOTES
Problem: Patient Education: Go to Patient Education Activity  Goal: Patient/Family Education  Outcome: Progressing Towards Goal     Problem: Pressure Injury - Risk of  Goal: *Prevention of pressure injury  Description  Document Dewey Scale and appropriate interventions in the flowsheet. Outcome: Progressing Towards Goal  Note: Pressure Injury Interventions:  Sensory Interventions: Assess changes in LOC, Check visual cues for pain, Discuss PT/OT consult with provider, Float heels, Keep linens dry and wrinkle-free, Maintain/enhance activity level, Minimize linen layers, Pressure redistribution bed/mattress (bed type)    Moisture Interventions: Absorbent underpads, Contain wound drainage, Limit adult briefs, Minimize layers, Check for incontinence Q2 hours and as needed    Activity Interventions: Increase time out of bed, Pressure redistribution bed/mattress(bed type), PT/OT evaluation    Mobility Interventions: HOB 30 degrees or less, Pressure redistribution bed/mattress (bed type), PT/OT evaluation    Nutrition Interventions: Document food/fluid/supplement intake    Friction and Shear Interventions: Apply protective barrier, creams and emollients, Foam dressings/transparent film/skin sealants, HOB 30 degrees or less, Lift sheet                Problem: Patient Education: Go to Patient Education Activity  Goal: Patient/Family Education  Outcome: Progressing Towards Goal     Problem: Falls - Risk of  Goal: *Absence of Falls  Description  Document Jeanne Fall Risk and appropriate interventions in the flowsheet.   Outcome: Progressing Towards Goal  Note: Fall Risk Interventions:  Mobility Interventions: Bed/chair exit alarm, Communicate number of staff needed for ambulation/transfer, OT consult for ADLs, Patient to call before getting OOB, PT Consult for mobility concerns    Mentation Interventions: Bed/chair exit alarm    Medication Interventions: Bed/chair exit alarm, Teach patient to arise slowly, Patient to call before getting OOB    Elimination Interventions: Bed/chair exit alarm, Call light in reach, Patient to call for help with toileting needs, Stay With Me (per policy), Toilet paper/wipes in reach, Toileting schedule/hourly rounds    History of Falls Interventions: Bed/chair exit alarm, Door open when patient unattended, Investigate reason for fall         Problem: Patient Education: Go to Patient Education Activity  Goal: Patient/Family Education  Outcome: Progressing Towards Goal     Problem: Diabetes Self-Management  Goal: *Disease process and treatment process  Description  Define diabetes and identify own type of diabetes; list 3 options for treating diabetes. Outcome: Progressing Towards Goal  Goal: *Incorporating nutritional management into lifestyle  Description  Describe effect of type, amount and timing of food on blood glucose; list 3 methods for planning meals. Outcome: Progressing Towards Goal  Goal: *Incorporating physical activity into lifestyle  Description  State effect of exercise on blood glucose levels. Outcome: Progressing Towards Goal  Goal: *Developing strategies to promote health/change behavior  Description  Define the ABC's of diabetes; identify appropriate screenings, schedule and personal plan for screenings. Outcome: Progressing Towards Goal  Goal: *Using medications safely  Description  State effect of diabetes medications on diabetes; name diabetes medication taking, action and side effects. Outcome: Progressing Towards Goal  Goal: *Monitoring blood glucose, interpreting and using results  Description  Identify recommended blood glucose targets  and personal targets. Outcome: Progressing Towards Goal  Goal: *Prevention, detection, treatment of acute complications  Description  List symptoms of hyper- and hypoglycemia; describe how to treat low blood sugar and actions for lowering  high blood glucose level.   Outcome: Progressing Towards Goal  Goal: *Prevention, detection and treatment of chronic complications  Description  Define the natural course of diabetes and describe the relationship of blood glucose levels to long term complications of diabetes.   Outcome: Progressing Towards Goal  Goal: *Developing strategies to address psychosocial issues  Description  Describe feelings about living with diabetes; identify support needed and support network  Outcome: Progressing Towards Goal     Problem: Patient Education: Go to Patient Education Activity  Goal: Patient/Family Education  Outcome: Progressing Towards Goal     Problem: Patient Education: Go to Patient Education Activity  Goal: Patient/Family Education  Outcome: Progressing Towards Goal     Problem: Nutrition Deficit  Goal: *Optimize nutritional status  Outcome: Progressing Towards Goal     Problem: Patient Education: Go to Patient Education Activity  Goal: Patient/Family Education  Outcome: Progressing Towards Goal     Problem: General Medical Care Plan  Goal: *Vital signs within specified parameters  Outcome: Progressing Towards Goal  Goal: *Labs within defined limits  Outcome: Progressing Towards Goal  Goal: *Absence of infection signs and symptoms  Description  Wash hand more often   Outcome: Progressing Towards Goal  Goal: *Optimal pain control at patient's stated goal  Outcome: Progressing Towards Goal  Goal: *Skin integrity maintained  Outcome: Progressing Towards Goal  Goal: *Fluid volume balance  Outcome: Progressing Towards Goal  Goal: *Optimize nutritional status  Outcome: Progressing Towards Goal  Goal: *Anxiety reduced or absent  Outcome: Progressing Towards Goal  Goal: *Progressive mobility and function (eg: ADL's)  Outcome: Progressing Towards Goal     Problem: Patient Education: Go to Patient Education Activity  Goal: Patient/Family Education  Outcome: Progressing Towards Goal     Problem: Patient Education: Go to Patient Education Activity  Goal: Patient/Family Education  Outcome: Progressing Towards Goal Problem: Patient Education: Go to Patient Education Activity  Goal: Patient/Family Education  Outcome: Progressing Towards Goal     Problem: Patient Education: Go to Patient Education Activity  Goal: Patient/Family Education  Outcome: Progressing Towards Goal     Problem: Ischemic Stroke: Discharge Outcomes  Goal: *Verbalizes anxiety and depression are reduced or absent  Outcome: Progressing Towards Goal  Goal: *Verbalize understanding of risk factor modification(Stroke Metric)  Outcome: Progressing Towards Goal  Goal: *Hemodynamically stable  Outcome: Progressing Towards Goal  Goal: *Absence of aspiration pneumonia  Outcome: Progressing Towards Goal  Goal: *Aware of needed dietary changes  Outcome: Progressing Towards Goal  Goal: *Verbalize understanding of prescribed medications including anti-coagulants, anti-lipid, and/or anti-platelets(Stroke Metric)  Outcome: Progressing Towards Goal  Goal: *Tolerating diet  Outcome: Progressing Towards Goal  Goal: *Aware of follow-up diagnostics related to anticoagulants  Outcome: Progressing Towards Goal  Goal: *Ability to perform ADLs and demonstrates progressive mobility and function  Outcome: Progressing Towards Goal  Goal: *Absence of DVT(Stroke Metric)  Outcome: Progressing Towards Goal  Goal: *Absence of aspiration  Outcome: Progressing Towards Goal  Goal: *Optimal pain control at patient's stated goal  Outcome: Progressing Towards Goal  Goal: *Home safety concerns addressed  Outcome: Progressing Towards Goal  Goal: *Describes available resources and support systems  Outcome: Progressing Towards Goal  Goal: *Verbalizes understanding of activation of EMS(911) for stroke symptoms(Stroke Metric)  Outcome: Progressing Towards Goal  Goal: *Understands and describes signs and symptoms to report to providers(Stroke Metric)  Outcome: Progressing Towards Goal  Goal: *Neurolgocially stable (absence of additional neurological deficits)  Outcome: Progressing Towards Goal  Goal: *Verbalizes importance of follow-up with primary care physician(Stroke Metric)  Outcome: Progressing Towards Goal  Goal: *Smoking cessation discussed,if applicable(Stroke Metric)  Outcome: Progressing Towards Goal  Goal: *Depression screening completed(Stroke Metric)  Outcome: Progressing Towards Goal     Problem: Pain  Goal: *Control of Pain  Outcome: Progressing Towards Goal     Problem: Patient Education: Go to Patient Education Activity  Goal: Patient/Family Education  Outcome: Progressing Towards Goal

## 2020-01-10 NOTE — PROGRESS NOTES
Hospitalist Progress Note     Admit Date:  2019  9:07 AM   Name:  Hazel Waldron   Age:  53 y.o.  :  1975   MRN:  [id number]   PCP:  Nava Bazzi MD      Subjective:     52 y/o smoker with DM, HTN, who presented to ER  with L leg and arm weakness, L facial droop, dysarthria. First noted L leg weakness late night  when he woke up to go to the bathroom. He was normal when he went to bed around 1030. EMS called. Noted to have slurred speech and L facial droop as well. Code S called in ER around 9am.  MRI with acute infarcts in L cerebellar hemisphere, deep frontoparietal white matter, and R paramedian yariel. Also noted old lacunar infarcts. No large vessel occlusion on CTA, but some stenosis noted. He was started on asa, statins. Neurology consulted. An ECHO showed PFO. Plan for cardiology referral and evaluate for closure as outpatient. PT/OT suggested inpatient rehabilitation. The patient is uninsured and the only option at this moment is IRC. Today, L weakness unchanged, no ac events, no significant resp spx, tolerates po, no changes    Objective:     Patient Vitals for the past 24 hrs:   Temp Pulse Resp BP SpO2   01/10/20 1200 97.8 °F (36.6 °C) 60 18 105/69 95 %   01/10/20 0800 97.4 °F (36.3 °C) 68 17 114/76 98 %   01/10/20 0400 98.3 °F (36.8 °C) 71 18 112/70 97 %   01/10/20 0000 98.4 °F (36.9 °C) 72 18 120/82 97 %   20 2000 98.4 °F (36.9 °C) 60 18 110/72 96 %   20 1600 98.2 °F (36.8 °C) 64 18 109/74 97 %     Oxygen Therapy  O2 Sat (%): 95 % (01/10/20 1200)  Pulse via Oximetry: 56 beats per minute (20)  O2 Device: Room air (20)  No intake or output data in the 24 hours ending 01/10/20 1529      REVIEW OF SYSTEMS: Comprehensive ROS performed and negative except as stated in HPI. Physical Examination:  General:          Well nourished. Alert and oriented. Mild distress  CV:                  RRR. No murmur, rub, or gallop.     Lungs: Clear to auscultation bilaterally. No wheezing, rhonchi, or rales  Extremities:     Warm and dry. No cyanosis or edema. Neurologic:      CN II-XII intact except for mild L facial droop. Sensation intact. PERRLA.  dysarthria. No                          confusion. STR o/5 LUE and LLE. Dysarthria noted. Skin:                No rashes or jaundice. No wounds. Psych:             Appears depressed    Data Review:  I have reviewed all labs, meds, telemetry events, and studies from the last 24 hours.     Recent Results (from the past 24 hour(s))   GLUCOSE, POC    Collection Time: 01/09/20  4:19 PM   Result Value Ref Range    Glucose (POC) 117 (H) 65 - 100 mg/dL   GLUCOSE, POC    Collection Time: 01/09/20  9:06 PM   Result Value Ref Range    Glucose (POC) 92 65 - 100 mg/dL   GLUCOSE, POC    Collection Time: 01/10/20  7:32 AM   Result Value Ref Range    Glucose (POC) 95 65 - 100 mg/dL   GLUCOSE, POC    Collection Time: 01/10/20 10:54 AM   Result Value Ref Range    Glucose (POC) 102 (H) 65 - 100 mg/dL        All Micro Results     None          Current Meds:  Current Facility-Administered Medications   Medication Dose Route Frequency    insulin lispro (HUMALOG) injection   SubCUTAneous TIDAC    metoprolol succinate (TOPROL-XL) XL tablet 25 mg  25 mg Oral DAILY    polyethylene glycol (MIRALAX) packet 17 g  17 g Oral DAILY PRN    insulin glargine (LANTUS) injection 10 Units  10 Units SubCUTAneous QHS    guaiFENesin (ROBITUSSIN) 100 mg/5 mL oral liquid 100 mg  100 mg Oral Q4H PRN    hydrALAZINE (APRESOLINE) 20 mg/mL injection 20 mg  20 mg IntraVENous Q4H PRN    atorvastatin (LIPITOR) tablet 80 mg  80 mg Oral QHS    lisinopril (PRINIVIL, ZESTRIL) tablet 40 mg  40 mg Oral DAILY    sodium chloride (NS) flush 5-40 mL  5-40 mL IntraVENous Q8H    sodium chloride (NS) flush 5-40 mL  5-40 mL IntraVENous PRN    ondansetron (ZOFRAN) injection 4 mg  4 mg IntraVENous Q4H PRN    aspirin chewable tablet 81 mg  81 mg Oral DAILY    acetaminophen (TYLENOL) tablet 650 mg  650 mg Oral Q4H PRN    enoxaparin (LOVENOX) injection 40 mg  40 mg SubCUTAneous Q24H    dextrose 40% (GLUTOSE) oral gel 1 Tube  15 g Oral PRN    glucagon (GLUCAGEN) injection 1 mg  1 mg IntraMUSCular PRN    dextrose (D50W) injection syrg 12.5-25 g  25-50 mL IntraVENous PRN       Diet:  DIET NUTRITIONAL SUPPLEMENTS  DIET DIABETIC CONSISTENT CARB    Other Studies (last 24 hours):  No results found. Assessment and Plan:     Hospital Problems as of 1/10/2020 Date Reviewed: 3/3/2017          Codes Class Noted - Resolved POA    PFO (patent foramen ovale) ICD-10-CM: Q21.1  ICD-9-CM: 745.5  12/17/2019 - Present Yes        Arrhythmia ICD-10-CM: I49.9  ICD-9-CM: 427.9  12/15/2019 - Present Yes        Hyperlipemia ICD-10-CM: E78.5  ICD-9-CM: 272.4  12/15/2019 - Present Yes        * (Principal) Acute embolic stroke [facility]) [id number]-40-RR: I63.9  ICD-9-CM: 434.11  12/12/2019 - Present Yes        Hypertension (Chronic) ICD-10-CM: I10  ICD-9-CM: 401.9  Unknown - Present Yes        Type 2 diabetes mellitus (HCC) (Chronic) ICD-10-CM: E11.9  ICD-9-CM: 250.00  Unknown - Present Yes              A/P:    Acute embolic stroke  Continues to have left sided neglect  Evaluated by cardiology and neurology  -MRI head: with acute infarcts in L cerebellar hemisphere, deep frontoparietal white matter, and R paramedian yariel. Also noted old lacunar infarcts.    -CTA head: No large vessel occlusion   -ISMAEL: 3 mm PFO     Plan:  -Cont statin, ASA  -PT/OT/ST  -Needs outpatient cardiology follow-up for event monitor and PFO closure.      Active Problems:     Hypertension  BP well controlled at this time  Episodes of bradycardia noted     Plan:  -Continue metoprolol; will adjust dose if repeat episodes of bradycardia  -Continue amlodipine     Type 2 diabetes mellitus - cont on Lantus and SSI     Arrhythmia - had brief wide complex tachycardia early in admission, will need event monitor as outpt per cards     Hyperlipemia - statin therapy     PFO (patent foramen ovale) - will need closure as outpatient per cardiology     Dysphagia: diet per ST    Dispo; AWAITING  medicaid approval and placement

## 2020-01-10 NOTE — PROGRESS NOTES
Problem: Mobility Impaired (Adult and Pediatric)  Goal: *Acute Goals and Plan of Care  Description  Goals adjusted 12/31/19  STG:  (1.) Mr. Wendy Fischer will move from supine to sit and sit to supine , scoot up and down and roll side to side with MINIMAL ASSIST within 3 treatment day(s). (2.) Mr. Wendy Fischer will transfer from bed to chair and chair to bed with MODERATE ASSIST using the least restrictive device within 3 treatment day(s). (3.) Mr. Wendy Fischer will perform standing static and dynamic balance activities x 10 minutes with MODERATE ASSISTx1 to improve safety within 3 treatment day(s). LTG:  (1.) Mr. Wendy Fischer will move from supine to sit and sit to supine , scoot up and down and roll side to side with CONTACT GUARD ASSIST within 7 treatment day(s). (2.) Mr. Wendy Fischer will transfer from bed to chair and chair to bed with MINIMAL ASSIST using the least restrictive device within 7 treatment day(s). (3.) Mr. Wendy Fischer will perform standing static and dynamic balance activities x 20 minutes with MINIMAL ASSIST to improve safety within 7 treatment day(s). (4.) Mr. Wendy Fischer will perform bilateral lower extremity exercises x 15 min for HEP with SUPERVISION to improve strength, endurance, and functional mobility within 7 treatment day(s).      PHYSICAL THERAPY: Daily Note and AM 1/10/2020  INPATIENT: PT Visit Days : 6  Payor: MEDICAID PENDING / Plan: [employer] PENDING / Product Type: Medicaid /       NAME/AGE/GENDER: Ebenezer Lima is a 53 y.o. male   PRIMARY DIAGNOSIS: Acute ischemic stroke ([address] [I63.9]  Cerebrovascular accident (CVA) due to embolism ([address] [A68.8] Acute embolic stroke ([address] Acute embolic stroke ([address]       ICD-10: Treatment Diagnosis:   · Other lack of cordination (R27.8)  · Difficulty in walking, Not elsewhere classified (R26.2)  · Other abnormalities of gait and mobility (R26.89)  · Unspecified Lack of Coordination (R27.9)  · Hemiplegia and hemiparesis following cerebral infarction affecting   · left non-dominant side (X14.035)   Precaution/Allergies:  Patient has no known allergies. ASSESSMENT:     Mr. Breanna Colbert is a 53year old admitted with acute CVA with left hemiparesis and left inattention. Patient is supine upon contact and agreeable to PT treatment. The patient performed supine to sit with min- mod Ax2 and verbal cues. He demonstrated good static sitting balance and fair dynamic sitting balance at the edge of the bed during reaching balance activities. The patient scooted forward with min A and performed sit to stand with min Ax2 to the hemiwalker. The patient demonstrated fair static standing balance with min Ax2 and verbal cueing for weight shifting. The patient sat with min Ax2 and took a seated rest break. The patient then participated in 2 more sit to stands with the same and instruction on weight shifting and hip hiking technique. The patient then participated in a stand pivot transfer with max Ax2 to the chair. The patient was then positioned for comfort with needs in reach. Overall the patient is making good progress toward therapy goals as indicated by increased activity tolerance. The patient is very motivated and works hard throughout. Will continue skilled PT treatment as patient is still below functional baseline. This section established at most recent assessment   PROBLEM LIST (Impairments causing functional limitations):  1. Decreased Strength  2. Decreased ADL/Functional Activities  3. Decreased Transfer Abilities  4. Decreased Ambulation Ability/Technique  5. Decreased Balance  6. Increased Pain  7. Decreased Activity Tolerance  8. Increased Fatigue  9. Decreased Flexibility/Joint Mobility   INTERVENTIONS PLANNED: (Benefits and precautions of physical therapy have been discussed with the patient.)  1. Balance Exercise  2. Bed Mobility  3. Family Education  4. Gait Training  5. Home Exercise Program (HEP)  6. Manual Therapy  7.  Neuromuscular Re-education/Strengthening  8. Range of Motion (ROM)  9. Therapeutic Activites  10. Therapeutic Exercise/Strengthening  11. Transfer Training     TREATMENT PLAN: Frequency/Duration: 3 times a week for duration of hospital stay  Rehabilitation Potential For Stated Goals: Good     REHAB RECOMMENDATIONS (at time of discharge pending progress):    Placement: It is my opinion, based on this patient's performance to date, that Mr. Kathleen Jensen may benefit from intensive therapy at an [address] [place] [address] after discharge due to a probable need for 24 hour rehab nursing, a probable need for multiple therapy disciplines, and potential to make ongoing and sustainable functional improvement that is of practical value. .  Equipment:    TBD pending progress with therapy. HISTORY:   History of Present Injury/Illness (Reason for Referral):  Per H&P: \"Pt is a 54 y/o smoker with DM, HTN, who presented to ER with L leg and arm weakness, L facial droop, dysarthria. First noted L leg weakness late night 12/11 when he woke up to go to the bathroom. He was normal when he went to bed around 1030. Woke up this morning and had persistent weakness L leg and also now noted in L arm. EMS called. Noted to have slurred speech and L facial droop as well. Code S called in ER around 9am.  MRI with acute infarcts in L cerebellar hemisphere, deep frontoparietal white matter, and R paramedian yariel. Also noted old lacunar infarcts. No large vessel occlusion on CTA, but some stenosis noted. No hx afib, TIA, CVA. No CP, palpitations, SOB. \"  Past Medical History/Comorbidities:   Mr. Kathleen Jensen  has a past medical history of Acute ischemic stroke ([address] (12/12/2019), Acute pancreatitis (11/19/2014), Cerebrovascular accident (CVA) due to embolism ([address] (12/12/2019), Diabetes ([address] (2002), Diabetes ([address] Diabetes mellitus, and Hypertension.  He also has no past medical history of Arthritis, Asthma, Autoimmune disease ([address] CAD (coronary artery disease), Cancer ([facility] Chronic kidney disease, COPD, Dementia, Dementia ([facility] Heart failure ([facility] Ill-defined condition, Infectious disease, Liver disease, Other ill-defined conditions(799.89), Psychiatric disorder, PUD (peptic ulcer disease), Seizures ([facility] or Sleep disorder. Mr. Naun Hampton  has a past surgical history that includes hx hernia repair and hx orthopaedic. Social History/Living Environment:   Home Environment: Apartment  # Steps to Enter: 12  Rails to Enter: Yes  [employer] : Bilateral  One/Two Story Residence: One story  Living Alone: No  Support Systems: Spouse/Significant Other/Partner  Patient Expects to be Discharged to[de-identified] Unknown  Current DME Used/Available at Home: None  Tub or Shower Type: Tub/Shower combination  Prior Level of Function/Work/Activity:  Independent, lives with wife in 2nd story 1 level apartment. No recent falls. Number of Personal Factors/Comorbidities that affect the Plan of Care: 1-2: MODERATE COMPLEXITY   EXAMINATION:   Most Recent Physical Functioning:   Gross Assessment:                  Posture:     Balance:  Sitting: Impaired  Sitting - Static: Good (unsupported)  Sitting - Dynamic: Fair (occasional)  Standing: Impaired  Standing - Static: Fair  Standing - Dynamic : Poor Bed Mobility:  Supine to Sit: Moderate assistance  Scooting: Minimum assistance  Wheelchair Mobility:     Transfers:  Sit to Stand: Minimum assistance;Assist x2  Stand to Sit: Minimum assistance;Assist x2  Stand Pivot Transfers: Moderate assistance;Maximum assistance;Assist x2  Interventions: Safety awareness training;Verbal cues  Gait:            Body Structures Involved:  1. Nerves  2. Voice/Speech  3. Bones  4. Joints  5. Muscles Body Functions Affected:  1. Mental  2. Sensory/Pain  3. Neuromusculoskeletal  4. Movement Related Activities and Participation Affected:  1. General Tasks and Demands  2. Mobility  3. Self Care  4.  Interpersonal Interactions and Relationships   Number of elements that affect the Plan of Care: 4+: HIGH COMPLEXITY   CLINICAL PRESENTATION:   Presentation: Evolving clinical presentation with changing clinical characteristics: MODERATE COMPLEXITY   CLINICAL DECISION MAKIN [facility] Inpatient Short Form  How much difficulty does the patient currently have. .. Unable A Lot A Little None   1. Turning over in bed (including adjusting bedclothes, sheets and blankets)? [] 1   [] 2   [x] 3   [] 4   2. Sitting down on and standing up from a chair with arms ( e.g., wheelchair, bedside commode, etc.)   [] 1   [x] 2   [] 3   [] 4   3. Moving from lying on back to sitting on the side of the bed? [] 1   [x] 2   [] 3   [] 4   How much help from another person does the patient currently need. .. Total A Lot A Little None   4. Moving to and from a bed to a chair (including a wheelchair)? [] 1   [x] 2   [] 3   [] 4   5. Need to walk in hospital room? [] 1   [x] 2   [] 3   [] 4   6. Climbing 3-5 steps with a railing? [] 1   [x] 2   [] 3   [] 4   © , Trustees of [address] [place] [address], under license to [employer]. All rights reserved      Score:  Initial: 13 Most Recent: 13 (Date:19 )    Interpretation of Tool:  Represents activities that are increasingly more difficult (i.e. Bed mobility, Transfers, Gait). Medical Necessity:     · Patient is expected to demonstrate progress in   · strength, range of motion, balance, coordination, and functional technique  ·  to   · increase independence with all mobility. · .  Reason for Services/Other Comments:  · Patient continues to require skilled intervention due to   · medical complications and mobility deficits which impact his level of function, safety, and independence as indicated above.    · .   Use of outcome tool(s) and clinical judgement create a POC that gives a: Questionable prediction of patient's progress: MODERATE COMPLEXITY        TREATMENT:   (In addition to Assessment/Re-Assessment sessions the following treatments were rendered)   Pre-treatment Symptoms/Complaints:  \"yes\"  Pain: Initial:   Pain Intensity 1: 0  Post Session:  none     REASSESSMENT    Therapeutic Activity: (    23 min): Therapeutic activities including bed mobility, rolling, scooting, and sit <> stand transfer training, weight shifting, and hip hiking to improve mobility, strength, balance, and coordination. Required moderate cues with minimal physical assist x2   to promote coordination of bilateral, upper extremity(s), lower extremity(s) and promote motor control of bilateral, upper extremity(s), lower extremity(s). Braces/Orthotics/Lines/Etc:   · O2 Device: Room Air   Treatment/Session Assessment:    · Response to Treatment:  see above. Works very hard with therapy. · Interdisciplinary Collaboration:   o Physical Therapy Assistant  o Registered Nurse  · After treatment position/precautions:   o Up in chair  o Bed alarm/tab alert on  o Bed/Chair-wheels locked  o Bed in low position  o Call light within reach  o RN notified   · Compliance with Program/Exercises: Compliant all of the time  · Recommendations/Intent for next treatment session: \"Next visit will focus on advancements to more challenging activities and reduction in assistance provided\".     Total Treatment Duration:  PT Patient Time In/Time Out  Time In: 1107  Time Out: [address], PTA

## 2020-01-10 NOTE — PROGRESS NOTES
01/09/20 1923   NIH Stroke Scale   Interval Other (comment)   LOC 0   LOC Questions 0   LOC Commands 0   Best Gaze 0   Visual 0   Facial Palsy 1   Motor Right Arm 0   Motor Left Arm 4   Motor Right Leg 0   Motor Left Leg 4   Limb Ataxia 0   Sensory 0   Best Language 1   Dysarthria 1   Extinction and Inattention 0   Total 11

## 2020-01-11 LAB
GLUCOSE BLD STRIP.AUTO-MCNC: 85 MG/DL (ref 65–100)
GLUCOSE BLD STRIP.AUTO-MCNC: 88 MG/DL (ref 65–100)
GLUCOSE BLD STRIP.AUTO-MCNC: 90 MG/DL (ref 65–100)
GLUCOSE BLD STRIP.AUTO-MCNC: 95 MG/DL (ref 65–100)

## 2020-01-11 PROCEDURE — 82962 GLUCOSE BLOOD TEST: CPT

## 2020-01-11 PROCEDURE — 65660000000 HC RM CCU STEPDOWN

## 2020-01-11 PROCEDURE — 74011250637 HC RX REV CODE- 250/637: Performed by: HOSPITALIST

## 2020-01-11 PROCEDURE — 74011250637 HC RX REV CODE- 250/637: Performed by: INTERNAL MEDICINE

## 2020-01-11 PROCEDURE — 74011636637 HC RX REV CODE- 636/637: Performed by: INTERNAL MEDICINE

## 2020-01-11 PROCEDURE — 74011250636 HC RX REV CODE- 250/636: Performed by: INTERNAL MEDICINE

## 2020-01-11 PROCEDURE — 65270000029 HC RM PRIVATE

## 2020-01-11 RX ADMIN — ENOXAPARIN SODIUM 40 MG: 40 INJECTION SUBCUTANEOUS at 14:51

## 2020-01-11 RX ADMIN — Medication 10 ML: at 22:52

## 2020-01-11 RX ADMIN — LISINOPRIL 40 MG: 20 TABLET ORAL at 08:07

## 2020-01-11 RX ADMIN — INSULIN GLARGINE 10 UNITS: 100 INJECTION, SOLUTION SUBCUTANEOUS at 22:48

## 2020-01-11 RX ADMIN — ASPIRIN 81 MG 81 MG: 81 TABLET ORAL at 08:07

## 2020-01-11 RX ADMIN — GUAIFENESIN 100 MG: 100 SOLUTION ORAL at 08:08

## 2020-01-11 RX ADMIN — ATORVASTATIN CALCIUM 80 MG: 80 TABLET, FILM COATED ORAL at 22:35

## 2020-01-11 RX ADMIN — ACETAMINOPHEN 650 MG: 325 TABLET, FILM COATED ORAL at 08:07

## 2020-01-11 RX ADMIN — Medication 5 ML: at 16:21

## 2020-01-11 RX ADMIN — ACETAMINOPHEN 650 MG: 325 TABLET, FILM COATED ORAL at 22:34

## 2020-01-11 RX ADMIN — Medication 5 ML: at 06:31

## 2020-01-11 RX ADMIN — METOPROLOL SUCCINATE 25 MG: 25 TABLET, FILM COATED, EXTENDED RELEASE ORAL at 08:07

## 2020-01-11 RX ADMIN — GUAIFENESIN 100 MG: 100 SOLUTION ORAL at 22:35

## 2020-01-11 NOTE — PROGRESS NOTES
Problem: Patient Education: Go to Patient Education Activity  Goal: Patient/Family Education  Outcome: Progressing Towards Goal     Problem: Pressure Injury - Risk of  Goal: *Prevention of pressure injury  Description  Document Deewy Scale and appropriate interventions in the flowsheet. Outcome: Progressing Towards Goal  Note: Pressure Injury Interventions:  Sensory Interventions: Assess changes in LOC, Discuss PT/OT consult with provider, Float heels, Keep linens dry and wrinkle-free, Maintain/enhance activity level, Minimize linen layers, Pressure redistribution bed/mattress (bed type)    Moisture Interventions: Absorbent underpads, Check for incontinence Q2 hours and as needed, Limit adult briefs, Minimize layers    Activity Interventions: Increase time out of bed, Pressure redistribution bed/mattress(bed type), PT/OT evaluation    Mobility Interventions: HOB 30 degrees or less, Pressure redistribution bed/mattress (bed type), PT/OT evaluation    Nutrition Interventions: Document food/fluid/supplement intake    Friction and Shear Interventions: Apply protective barrier, creams and emollients, Foam dressings/transparent film/skin sealants, HOB 30 degrees or less, Lift sheet                Problem: Patient Education: Go to Patient Education Activity  Goal: Patient/Family Education  Outcome: Progressing Towards Goal     Problem: Falls - Risk of  Goal: *Absence of Falls  Description  Document Jeanne Fall Risk and appropriate interventions in the flowsheet.   Outcome: Progressing Towards Goal  Note: Fall Risk Interventions:  Mobility Interventions: Communicate number of staff needed for ambulation/transfer, OT consult for ADLs, Patient to call before getting OOB, PT Consult for mobility concerns    Mentation Interventions: Bed/chair exit alarm    Medication Interventions: Patient to call before getting OOB, Teach patient to arise slowly    Elimination Interventions: Call light in reach, Patient to call for help with toileting needs, Stay With Me (per policy), Toilet paper/wipes in reach, Toileting schedule/hourly rounds    History of Falls Interventions: Door open when patient unattended, Investigate reason for fall         Problem: Patient Education: Go to Patient Education Activity  Goal: Patient/Family Education  Outcome: Progressing Towards Goal     Problem: Diabetes Self-Management  Goal: *Disease process and treatment process  Description  Define diabetes and identify own type of diabetes; list 3 options for treating diabetes. Outcome: Progressing Towards Goal  Goal: *Incorporating nutritional management into lifestyle  Description  Describe effect of type, amount and timing of food on blood glucose; list 3 methods for planning meals. Outcome: Progressing Towards Goal  Goal: *Incorporating physical activity into lifestyle  Description  State effect of exercise on blood glucose levels. Outcome: Progressing Towards Goal  Goal: *Developing strategies to promote health/change behavior  Description  Define the ABC's of diabetes; identify appropriate screenings, schedule and personal plan for screenings. Outcome: Progressing Towards Goal  Goal: *Using medications safely  Description  State effect of diabetes medications on diabetes; name diabetes medication taking, action and side effects. Outcome: Progressing Towards Goal  Goal: *Monitoring blood glucose, interpreting and using results  Description  Identify recommended blood glucose targets  and personal targets. Outcome: Progressing Towards Goal  Goal: *Prevention, detection, treatment of acute complications  Description  List symptoms of hyper- and hypoglycemia; describe how to treat low blood sugar and actions for lowering  high blood glucose level.   Outcome: Progressing Towards Goal  Goal: *Prevention, detection and treatment of chronic complications  Description  Define the natural course of diabetes and describe the relationship of blood glucose levels to long term complications of diabetes.   Outcome: Progressing Towards Goal  Goal: *Developing strategies to address psychosocial issues  Description  Describe feelings about living with diabetes; identify support needed and support network  Outcome: Progressing Towards Goal     Problem: Patient Education: Go to Patient Education Activity  Goal: Patient/Family Education  Outcome: Progressing Towards Goal     Problem: Patient Education: Go to Patient Education Activity  Goal: Patient/Family Education  Outcome: Progressing Towards Goal     Problem: Nutrition Deficit  Goal: *Optimize nutritional status  Outcome: Progressing Towards Goal     Problem: Patient Education: Go to Patient Education Activity  Goal: Patient/Family Education  Outcome: Progressing Towards Goal     Problem: General Medical Care Plan  Goal: *Vital signs within specified parameters  Outcome: Progressing Towards Goal  Goal: *Labs within defined limits  Outcome: Progressing Towards Goal  Goal: *Absence of infection signs and symptoms  Description  Wash hand more often   Outcome: Progressing Towards Goal  Goal: *Optimal pain control at patient's stated goal  Outcome: Progressing Towards Goal  Goal: *Skin integrity maintained  Outcome: Progressing Towards Goal  Goal: *Fluid volume balance  Outcome: Progressing Towards Goal  Goal: *Optimize nutritional status  Outcome: Progressing Towards Goal  Goal: *Anxiety reduced or absent  Outcome: Progressing Towards Goal  Goal: *Progressive mobility and function (eg: ADL's)  Outcome: Progressing Towards Goal     Problem: Patient Education: Go to Patient Education Activity  Goal: Patient/Family Education  Outcome: Progressing Towards Goal     Problem: Patient Education: Go to Patient Education Activity  Goal: Patient/Family Education  Outcome: Progressing Towards Goal     Problem: Patient Education: Go to Patient Education Activity  Goal: Patient/Family Education  Outcome: Progressing Towards Goal     Problem: Patient Education: Go to Patient Education Activity  Goal: Patient/Family Education  Outcome: Progressing Towards Goal     Problem: Ischemic Stroke: Discharge Outcomes  Goal: *Verbalizes anxiety and depression are reduced or absent  Outcome: Progressing Towards Goal  Goal: *Verbalize understanding of risk factor modification(Stroke Metric)  Outcome: Progressing Towards Goal  Goal: *Hemodynamically stable  Outcome: Progressing Towards Goal  Goal: *Absence of aspiration pneumonia  Outcome: Progressing Towards Goal  Goal: *Aware of needed dietary changes  Outcome: Progressing Towards Goal  Goal: *Verbalize understanding of prescribed medications including anti-coagulants, anti-lipid, and/or anti-platelets(Stroke Metric)  Outcome: Progressing Towards Goal  Goal: *Tolerating diet  Outcome: Progressing Towards Goal  Goal: *Aware of follow-up diagnostics related to anticoagulants  Outcome: Progressing Towards Goal  Goal: *Ability to perform ADLs and demonstrates progressive mobility and function  Outcome: Progressing Towards Goal  Goal: *Absence of DVT(Stroke Metric)  Outcome: Progressing Towards Goal  Goal: *Absence of aspiration  Outcome: Progressing Towards Goal  Goal: *Optimal pain control at patient's stated goal  Outcome: Progressing Towards Goal  Goal: *Home safety concerns addressed  Outcome: Progressing Towards Goal  Goal: *Describes available resources and support systems  Outcome: Progressing Towards Goal  Goal: *Verbalizes understanding of activation of EMS(911) for stroke symptoms(Stroke Metric)  Outcome: Progressing Towards Goal  Goal: *Understands and describes signs and symptoms to report to providers(Stroke Metric)  Outcome: Progressing Towards Goal  Goal: *Neurolgocially stable (absence of additional neurological deficits)  Outcome: Progressing Towards Goal  Goal: *Verbalizes importance of follow-up with primary care physician(Stroke Metric)  Outcome: Progressing Towards Goal  Goal: *Smoking cessation discussed,if applicable(Stroke Metric)  Outcome: Progressing Towards Goal  Goal: *Depression screening completed(Stroke Metric)  Outcome: Progressing Towards Goal     Problem: Pain  Goal: *Control of Pain  Outcome: Progressing Towards Goal     Problem: Patient Education: Go to Patient Education Activity  Goal: Patient/Family Education  Outcome: Progressing Towards Goal

## 2020-01-11 NOTE — PROGRESS NOTES
01/11/20 4466   NIH Stroke Scale   Interval Other (comment)   LOC 0   LOC Questions 0   LOC Commands 0   Best Gaze 0   Visual 0   Facial Palsy 1   Motor Right Arm 0   Motor Left Arm 4   Motor Right Leg 0   Motor Left Leg 4   Limb Ataxia 0   Sensory 0   Best Language 1   Dysarthria 1   Extinction and Inattention 0   Total 11

## 2020-01-11 NOTE — PROGRESS NOTES
Visit with patient to build rapport with .   Calm  Encouraged with presence and words of tika Bianchi,  Staff   C: [phone]  /  [email or web address]

## 2020-01-11 NOTE — PROGRESS NOTES
01/10/20 2004   NIH Stroke Scale   Interval Other (comment)   LOC 0   LOC Questions 0   LOC Commands 0   Best Gaze 0   Visual 0   Facial Palsy 1   Motor Right Arm 0   Motor Left Arm 4   Motor Right Leg 0   Motor Left Leg 4   Limb Ataxia 0   Sensory 0   Best Language 1   Dysarthria 1   Extinction and Inattention 0   Total 11

## 2020-01-11 NOTE — PROGRESS NOTES
Hospitalist Progress Note     Admit Date:  2019  9:07 AM   Name:  Calin Yen   Age:  53 y.o.  :  1975   MRN:  [id number]   PCP:  Keyana Dutton MD      Subjective:     54 y/o smoker with DM, HTN, who presented to ER  with L leg and arm weakness, L facial droop, dysarthria. First noted L leg weakness late night  when he woke up to go to the bathroom. He was normal when he went to bed around 1030. EMS called. Noted to have slurred speech and L facial droop as well. Code S called in ER around 9am.  MRI with acute infarcts in L cerebellar hemisphere, deep frontoparietal white matter, and R paramedian yariel. Also noted old lacunar infarcts. No large vessel occlusion on CTA, but some stenosis noted. He was started on asa, statins. Neurology consulted. An ECHO showed PFO. Plan for cardiology referral and evaluate for closure as outpatient. PT/OT suggested inpatient rehabilitation. The patient is uninsured and the only option at this moment is [facility]. Today, L weakness unchanged, No ac events, no significant resp spx, tolerates po    Objective:     Patient Vitals for the past 24 hrs:   Temp Pulse Resp BP SpO2   20 1200 98.4 °F (36.9 °C) 60 18 107/68 100 %   20 0800 98.5 °F (36.9 °C) 87 18 113/77 91 %   20 0400 97.8 °F (36.6 °C) 69 18 124/86 96 %   20 0000 97.7 °F (36.5 °C) 76 17 116/77 98 %   01/10/20 2000 98.6 °F (37 °C) 60 18 107/71 97 %   01/10/20 1600 97.7 °F (36.5 °C) 60 17 110/73 97 %     Oxygen Therapy  O2 Sat (%): 100 % (20 1200)  Pulse via Oximetry: 56 beats per minute (20)  O2 Device: Room air (20)  No intake or output data in the 24 hours ending 20 1312      REVIEW OF SYSTEMS: Comprehensive ROS performed and negative except as stated in HPI. Physical Examination:  General:          Well nourished. Alert and oriented. Mild distress  CV:                  RRR. No murmur, rub, or gallop.     Lungs:             Clear to auscultation bilaterally. No wheezing, rhonchi, or rales  Extremities:     Warm and dry. No cyanosis or edema. Neurologic:      CN II-XII intact except for mild L facial droop. Sensation intact. PERRLA.  dysarthria. No                          confusion. STR o/5 LUE and LLE. Dysarthria noted. Skin:                No rashes or jaundice. No wounds. Psych:             Appears depressed    Data Review:  I have reviewed all labs, meds, telemetry events, and studies from the last 24 hours.     Recent Results (from the past 24 hour(s))   GLUCOSE, POC    Collection Time: 01/10/20  3:18 PM   Result Value Ref Range    Glucose (POC) 108 (H) 65 - 100 mg/dL   GLUCOSE, POC    Collection Time: 01/10/20  8:35 PM   Result Value Ref Range    Glucose (POC) 94 65 - 100 mg/dL   GLUCOSE, POC    Collection Time: 01/11/20  7:33 AM   Result Value Ref Range    Glucose (POC) 95 65 - 100 mg/dL   GLUCOSE, POC    Collection Time: 01/11/20 12:04 PM   Result Value Ref Range    Glucose (POC) 88 65 - 100 mg/dL        All Micro Results     None          Current Meds:  Current Facility-Administered Medications   Medication Dose Route Frequency    insulin lispro (HUMALOG) injection   SubCUTAneous TIDAC    metoprolol succinate (TOPROL-XL) XL tablet 25 mg  25 mg Oral DAILY    polyethylene glycol (MIRALAX) packet 17 g  17 g Oral DAILY PRN    insulin glargine (LANTUS) injection 10 Units  10 Units SubCUTAneous QHS    guaiFENesin (ROBITUSSIN) 100 mg/5 mL oral liquid 100 mg  100 mg Oral Q4H PRN    hydrALAZINE (APRESOLINE) 20 mg/mL injection 20 mg  20 mg IntraVENous Q4H PRN    atorvastatin (LIPITOR) tablet 80 mg  80 mg Oral QHS    lisinopril (PRINIVIL, ZESTRIL) tablet 40 mg  40 mg Oral DAILY    sodium chloride (NS) flush 5-40 mL  5-40 mL IntraVENous Q8H    sodium chloride (NS) flush 5-40 mL  5-40 mL IntraVENous PRN    ondansetron (ZOFRAN) injection 4 mg  4 mg IntraVENous Q4H PRN    aspirin chewable tablet 81 mg  81 mg Oral DAILY    acetaminophen (TYLENOL) tablet 650 mg  650 mg Oral Q4H PRN    enoxaparin (LOVENOX) injection 40 mg  40 mg SubCUTAneous Q24H    dextrose 40% (GLUTOSE) oral gel 1 Tube  15 g Oral PRN    glucagon (GLUCAGEN) injection 1 mg  1 mg IntraMUSCular PRN    dextrose (D50W) injection syrg 12.5-25 g  25-50 mL IntraVENous PRN       Diet:  DIET NUTRITIONAL SUPPLEMENTS  DIET DIABETIC CONSISTENT CARB    Other Studies (last 24 hours):  No results found. Assessment and Plan:     Hospital Problems as of 1/11/2020 Date Reviewed: 3/3/2017          Codes Class Noted - Resolved POA    PFO (patent foramen ovale) ICD-10-CM: Q21.1  ICD-9-CM: 745.5  12/17/2019 - Present Yes        Arrhythmia ICD-10-CM: I49.9  ICD-9-CM: 427.9  12/15/2019 - Present Yes        Hyperlipemia ICD-10-CM: E78.5  ICD-9-CM: 272.4  12/15/2019 - Present Yes        * (Principal) Acute embolic stroke [facility]) [id number]: I63.9  ICD-9-CM: 434.11  12/12/2019 - Present Yes        Hypertension (Chronic) ICD-10-CM: I10  ICD-9-CM: 401.9  Unknown - Present Yes        Type 2 diabetes mellitus (HCC) (Chronic) ICD-10-CM: E11.9  ICD-9-CM: 250.00  Unknown - Present Yes              A/P:    Acute embolic stroke  Continues to have left sided neglect  Evaluated by cardiology and neurology  -MRI head: with acute infarcts in L cerebellar hemisphere, deep frontoparietal white matter, and R paramedian yariel. Also noted old lacunar infarcts.    -CTA head: No large vessel occlusion   -ISMAEL: 3 mm PFO     Plan:  -Cont statin, ASA  -PT/OT/ST  -Needs outpatient cardiology follow-up for event monitor and PFO closure.      Active Problems:     Hypertension  BP well controlled at this time  Episodes of bradycardia noted     Plan:  -Continue metoprolol; will adjust dose if repeat episodes of bradycardia  -Continue amlodipine     Type 2 diabetes mellitus - cont on Lantus and SSI     Arrhythmia - had brief wide complex tachycardia early in admission, will need event monitor as outpt per cards     Hyperlipemia - statin therapy     PFO (patent foramen ovale) - will need closure as outpatient per cardiology     Dysphagia: diet per ST    Dispo; AWAITING  medicaid approval and placement

## 2020-01-12 LAB
GLUCOSE BLD STRIP.AUTO-MCNC: 100 MG/DL (ref 65–100)
GLUCOSE BLD STRIP.AUTO-MCNC: 84 MG/DL (ref 65–100)
GLUCOSE BLD STRIP.AUTO-MCNC: 93 MG/DL (ref 65–100)
GLUCOSE BLD STRIP.AUTO-MCNC: 93 MG/DL (ref 65–100)

## 2020-01-12 PROCEDURE — 74011250637 HC RX REV CODE- 250/637: Performed by: INTERNAL MEDICINE

## 2020-01-12 PROCEDURE — 74011250636 HC RX REV CODE- 250/636: Performed by: INTERNAL MEDICINE

## 2020-01-12 PROCEDURE — 65660000000 HC RM CCU STEPDOWN

## 2020-01-12 PROCEDURE — 65270000029 HC RM PRIVATE

## 2020-01-12 PROCEDURE — 82962 GLUCOSE BLOOD TEST: CPT

## 2020-01-12 PROCEDURE — 74011250637 HC RX REV CODE- 250/637: Performed by: HOSPITALIST

## 2020-01-12 RX ADMIN — GUAIFENESIN 100 MG: 100 SOLUTION ORAL at 17:48

## 2020-01-12 RX ADMIN — ENOXAPARIN SODIUM 40 MG: 40 INJECTION SUBCUTANEOUS at 15:08

## 2020-01-12 RX ADMIN — GUAIFENESIN 100 MG: 100 SOLUTION ORAL at 08:22

## 2020-01-12 RX ADMIN — ACETAMINOPHEN 650 MG: 325 TABLET, FILM COATED ORAL at 08:22

## 2020-01-12 RX ADMIN — METOPROLOL SUCCINATE 25 MG: 25 TABLET, FILM COATED, EXTENDED RELEASE ORAL at 08:24

## 2020-01-12 RX ADMIN — LISINOPRIL 40 MG: 20 TABLET ORAL at 08:24

## 2020-01-12 RX ADMIN — Medication 5 ML: at 15:08

## 2020-01-12 RX ADMIN — ACETAMINOPHEN 650 MG: 325 TABLET, FILM COATED ORAL at 17:48

## 2020-01-12 RX ADMIN — GUAIFENESIN 100 MG: 100 SOLUTION ORAL at 01:00

## 2020-01-12 RX ADMIN — ASPIRIN 81 MG 81 MG: 81 TABLET ORAL at 08:18

## 2020-01-12 NOTE — PROGRESS NOTES
Hospitalist Progress Note     Admit Date:  2019  9:07 AM   Name:  Alanna Boss   Age:  53 y.o.  :  1975   MRN:  [id number]   PCP:  Nic Deluna MD      Subjective:     52 y/o smoker with DM, HTN, who presented to ER  with L leg and arm weakness, L facial droop, dysarthria. First noted L leg weakness late night  when he woke up to go to the bathroom. He was normal when he went to bed around 1030. EMS called. Noted to have slurred speech and L facial droop as well. Code S called in ER around 9am.  MRI with acute infarcts in L cerebellar hemisphere, deep frontoparietal white matter, and R paramedian yariel. Also noted old lacunar infarcts. No large vessel occlusion on CTA, but some stenosis noted. He was started on asa, statins. Neurology consulted. An ECHO showed PFO. Plan for cardiology referral and evaluate for closure as outpatient. PT/OT suggested inpatient rehabilitation. The patient is uninsured and the only option at this moment is [facility]. Today, L weakness unchanged, No ac events, no significant resp spx, tolerates po fine    Objective:     Patient Vitals for the past 24 hrs:   Temp Pulse Resp BP SpO2   20 1200 97.4 °F (36.3 °C) 61 18 118/79 99 %   20 0800 97.8 °F (36.6 °C) 70 18 108/72 99 %   20 0400 98 °F (36.7 °C) 64 19 119/84 96 %   20 0000 98.4 °F (36.9 °C) 69 18 121/84 96 %   20 2000 98.1 °F (36.7 °C) 67 19 100/64 97 %   20 1600 98.1 °F (36.7 °C) (!) 54 18 92/67 100 %     Oxygen Therapy  O2 Sat (%): 99 % (20 1200)  Pulse via Oximetry: 56 beats per minute (20)  O2 Device: Room air (20)  No intake or output data in the 24 hours ending 20 1427      REVIEW OF SYSTEMS: Comprehensive ROS performed and negative except as stated in HPI. Physical Examination:  General:          Well nourished. Alert and oriented. Mild distress  CV:                  RRR. No murmur, rub, or gallop.     Lungs: Clear to auscultation bilaterally. No wheezing, rhonchi, or rales  Extremities:     Warm and dry. No cyanosis or edema. Neurologic:      CN II-XII intact except for mild L facial droop. Sensation intact. PERRLA.  dysarthria. No                          confusion. STR o/5 LUE and LLE. Dysarthria noted. Skin:                No rashes or jaundice. No wounds. Psych:             Appears depressed    Data Review:  I have reviewed all labs, meds, telemetry events, and studies from the last 24 hours.     Recent Results (from the past 24 hour(s))   GLUCOSE, POC    Collection Time: 01/11/20  3:42 PM   Result Value Ref Range    Glucose (POC) 90 65 - 100 mg/dL   GLUCOSE, POC    Collection Time: 01/11/20 10:47 PM   Result Value Ref Range    Glucose (POC) 85 65 - 100 mg/dL   GLUCOSE, POC    Collection Time: 01/12/20  7:35 AM   Result Value Ref Range    Glucose (POC) 93 65 - 100 mg/dL   GLUCOSE, POC    Collection Time: 01/12/20 11:32 AM   Result Value Ref Range    Glucose (POC) 93 65 - 100 mg/dL        All Micro Results     None          Current Meds:  Current Facility-Administered Medications   Medication Dose Route Frequency    insulin lispro (HUMALOG) injection   SubCUTAneous TIDAC    metoprolol succinate (TOPROL-XL) XL tablet 25 mg  25 mg Oral DAILY    polyethylene glycol (MIRALAX) packet 17 g  17 g Oral DAILY PRN    insulin glargine (LANTUS) injection 10 Units  10 Units SubCUTAneous QHS    guaiFENesin (ROBITUSSIN) 100 mg/5 mL oral liquid 100 mg  100 mg Oral Q4H PRN    hydrALAZINE (APRESOLINE) 20 mg/mL injection 20 mg  20 mg IntraVENous Q4H PRN    atorvastatin (LIPITOR) tablet 80 mg  80 mg Oral QHS    lisinopril (PRINIVIL, ZESTRIL) tablet 40 mg  40 mg Oral DAILY    sodium chloride (NS) flush 5-40 mL  5-40 mL IntraVENous Q8H    sodium chloride (NS) flush 5-40 mL  5-40 mL IntraVENous PRN    ondansetron (ZOFRAN) injection 4 mg  4 mg IntraVENous Q4H PRN    aspirin chewable tablet 81 mg  81 mg Oral DAILY  acetaminophen (TYLENOL) tablet 650 mg  650 mg Oral Q4H PRN    enoxaparin (LOVENOX) injection 40 mg  40 mg SubCUTAneous Q24H    dextrose 40% (GLUTOSE) oral gel 1 Tube  15 g Oral PRN    glucagon (GLUCAGEN) injection 1 mg  1 mg IntraMUSCular PRN    dextrose (D50W) injection syrg 12.5-25 g  25-50 mL IntraVENous PRN       Diet:  DIET NUTRITIONAL SUPPLEMENTS  DIET DIABETIC CONSISTENT CARB    Other Studies (last 24 hours):  No results found. Assessment and Plan:     Hospital Problems as of 1/12/2020 Date Reviewed: 3/3/2017          Codes Class Noted - Resolved POA    PFO (patent foramen ovale) ICD-10-CM: Q21.1  ICD-9-CM: 745.5  12/17/2019 - Present Yes        Arrhythmia ICD-10-CM: I49.9  ICD-9-CM: 427.9  12/15/2019 - Present Yes        Hyperlipemia ICD-10-CM: E78.5  ICD-9-CM: 272.4  12/15/2019 - Present Yes        * (Principal) Acute embolic stroke [facility]) [id number]: I63.9  ICD-9-CM: 434.11  12/12/2019 - Present Yes        Hypertension (Chronic) ICD-10-CM: I10  ICD-9-CM: 401.9  Unknown - Present Yes        Type 2 diabetes mellitus (HCC) (Chronic) ICD-10-CM: E11.9  ICD-9-CM: 250.00  Unknown - Present Yes              A/P:    Acute embolic stroke  Continues to have left sided neglect  Evaluated by cardiology and neurology  -MRI head: with acute infarcts in L cerebellar hemisphere, deep frontoparietal white matter, and R paramedian yariel. Also noted old lacunar infarcts.    -CTA head: No large vessel occlusion   -ISMAEL: 3 mm PFO     Plan:  -Cont statin, ASA  -PT/OT/ST  -Needs outpatient cardiology follow-up for event monitor and PFO closure.      Active Problems:     Hypertension  BP well controlled at this time  Episodes of bradycardia noted     Plan:  -Continue metoprolol; will adjust dose if repeat episodes of bradycardia  -Continue amlodipine     Type 2 diabetes mellitus - cont on Lantus and SSI     Arrhythmia - had brief wide complex tachycardia early in admission, will need event monitor as outpt per cards     Hyperlipemia - statin therapy     PFO (patent foramen ovale) - will need closure as outpatient per cardiology     Dysphagia: diet per ST    Dispo; AWAITING  medicaid approval and placement

## 2020-01-12 NOTE — PROGRESS NOTES
01/12/20 0400   NIH Stroke Scale   Interval Other (comment)  (Neuro checks)   LOC 0   LOC Questions 0   LOC Commands 0   Motor Right Arm 0   Motor Left Arm 4   Motor Right Leg 0   Motor Left Leg 3   Dysarthria 1     Neuro checks

## 2020-01-12 NOTE — PROGRESS NOTES
01/12/20 0736   NIH Stroke Scale   Interval Other (comment)  (Dual [facility] with Al, RN)   LOC 0   LOC Questions 0   LOC Commands 0   Best Gaze 0   Visual 0   Facial Palsy 1   Motor Right Arm 0   Motor Left Arm 4   Motor Right Leg 0   Motor Left Leg 4   Limb Ataxia 0   Sensory 0   Best Language 1   Dysarthria 1   Extinction and Inattention 0   Total 11     Dual [facility] with Al, RN

## 2020-01-12 NOTE — PROGRESS NOTES
01/11/20 2230   NIH Stroke Scale   Interval Other (comment)  (Neuro checks)   LOC 0   LOC Questions 0   LOC Commands 0   Facial Palsy 1   Motor Right Arm 0   Motor Left Arm 4   Motor Right Leg 0   Motor Left Leg 4   Dysarthria 1     Neuro checks

## 2020-01-12 NOTE — PROGRESS NOTES
01/12/20 0000   NIH Stroke Scale   Interval Other (comment)  (Neuro checks)   LOC 0   LOC Questions 0   LOC Commands 0   Motor Right Arm 0   Motor Left Arm 4   Motor Right Leg 0   Motor Left Leg 4   Dysarthria 1     Neuro checks

## 2020-01-13 LAB
GLUCOSE BLD STRIP.AUTO-MCNC: 102 MG/DL (ref 65–100)
GLUCOSE BLD STRIP.AUTO-MCNC: 102 MG/DL (ref 65–100)
GLUCOSE BLD STRIP.AUTO-MCNC: 76 MG/DL (ref 65–100)
GLUCOSE BLD STRIP.AUTO-MCNC: 94 MG/DL (ref 65–100)

## 2020-01-13 PROCEDURE — 74011636637 HC RX REV CODE- 636/637: Performed by: INTERNAL MEDICINE

## 2020-01-13 PROCEDURE — 74011250637 HC RX REV CODE- 250/637: Performed by: INTERNAL MEDICINE

## 2020-01-13 PROCEDURE — 97530 THERAPEUTIC ACTIVITIES: CPT

## 2020-01-13 PROCEDURE — 65270000029 HC RM PRIVATE

## 2020-01-13 PROCEDURE — 65660000000 HC RM CCU STEPDOWN

## 2020-01-13 PROCEDURE — 74011250636 HC RX REV CODE- 250/636: Performed by: INTERNAL MEDICINE

## 2020-01-13 PROCEDURE — 74011250637 HC RX REV CODE- 250/637: Performed by: HOSPITALIST

## 2020-01-13 PROCEDURE — 82962 GLUCOSE BLOOD TEST: CPT

## 2020-01-13 RX ADMIN — METOPROLOL SUCCINATE 25 MG: 25 TABLET, FILM COATED, EXTENDED RELEASE ORAL at 08:30

## 2020-01-13 RX ADMIN — LISINOPRIL 40 MG: 20 TABLET ORAL at 08:30

## 2020-01-13 RX ADMIN — ATORVASTATIN CALCIUM 80 MG: 80 TABLET, FILM COATED ORAL at 00:06

## 2020-01-13 RX ADMIN — GUAIFENESIN 100 MG: 100 SOLUTION ORAL at 00:07

## 2020-01-13 RX ADMIN — INSULIN GLARGINE 10 UNITS: 100 INJECTION, SOLUTION SUBCUTANEOUS at 00:06

## 2020-01-13 RX ADMIN — ENOXAPARIN SODIUM 40 MG: 40 INJECTION SUBCUTANEOUS at 16:50

## 2020-01-13 RX ADMIN — ACETAMINOPHEN 650 MG: 325 TABLET, FILM COATED ORAL at 00:06

## 2020-01-13 RX ADMIN — ASPIRIN 81 MG 81 MG: 81 TABLET ORAL at 08:30

## 2020-01-13 NOTE — PROGRESS NOTES
01/12/20 1924   NIH Stroke Scale   Interval Other (comment)   LOC 0   LOC Questions 0   LOC Commands 0   Best Gaze 0   Visual 0   Facial Palsy 1   Motor Right Arm 0   Motor Left Arm 4   Motor Right Leg 0   Motor Left Leg 4   Limb Ataxia 0   Sensory 0   Best Language 1   Dysarthria 1   Extinction and Inattention 0   Total 11

## 2020-01-13 NOTE — PROGRESS NOTES
01/13/20 0400   NIH Stroke Scale   Interval Other (comment)  (Neuro checks)   LOC 0   LOC Questions 0   LOC Commands 0   Motor Right Arm 0   Motor Left Arm 4   Motor Right Leg 0   Motor Left Leg 4   Dysarthria 1     Neuro checks

## 2020-01-13 NOTE — PROGRESS NOTES
01/13/20 0000   NIH Stroke Scale   Interval Other (comment)  (Neuro checks)   LOC 0   LOC Questions 0   LOC Commands 0   Motor Right Arm 0   Motor Left Arm 4   Motor Right Leg 0   Motor Left Leg 4   Dysarthria 1     Neuro checks

## 2020-01-13 NOTE — PROGRESS NOTES
01/12/20 2000   NIH Stroke Scale   Interval Other (comment)  (Neuro checks)   LOC 0   LOC Questions 0   LOC Commands 0   Motor Right Arm 0   Motor Left Arm 4   Motor Right Leg 0   Motor Left Leg 4   Dysarthria 1     Neuro checks

## 2020-01-13 NOTE — PROGRESS NOTES
Problem: Mobility Impaired (Adult and Pediatric)  Goal: *Acute Goals and Plan of Care  Description  Goals adjusted 12/31/19  STG:  (1.) Mr. Juvencio Saavedra will move from supine to sit and sit to supine , scoot up and down and roll side to side with MINIMAL ASSIST within 3 treatment day(s). (2.) Mr. Juvencio Saavedra will transfer from bed to chair and chair to bed with MODERATE ASSIST using the least restrictive device within 3 treatment day(s). (3.) Mr. Juvencio Saavedra will perform standing static and dynamic balance activities x 10 minutes with MODERATE ASSISTx1 to improve safety within 3 treatment day(s). LTG:  (1.) Mr. Juvencio Saavedra will move from supine to sit and sit to supine , scoot up and down and roll side to side with CONTACT GUARD ASSIST within 7 treatment day(s). (2.) Mr. Juvencio Saavedra will transfer from bed to chair and chair to bed with MINIMAL ASSIST using the least restrictive device within 7 treatment day(s). (3.) Mr. Juvencio Saavedra will perform standing static and dynamic balance activities x 20 minutes with MINIMAL ASSIST to improve safety within 7 treatment day(s). (4.) Mr. Juvencio Saavedra will perform bilateral lower extremity exercises x 15 min for HEP with SUPERVISION to improve strength, endurance, and functional mobility within 7 treatment day(s).      PHYSICAL THERAPY: Daily Note and AM 1/13/2020  INPATIENT: PT Visit Days : 7  Payor: MEDICAID PENDING / Plan: Cheri Mckeon PENDING / Product Type: Medicaid /       NAME/AGE/GENDER: Allie Winters is a 53 y.o. male   PRIMARY DIAGNOSIS: Acute ischemic stroke ([address] [I63.9]  Cerebrovascular accident (CVA) due to embolism ([address] [E78.5] Acute embolic stroke ([address] Acute embolic stroke ([address]       ICD-10: Treatment Diagnosis:   · Other lack of cordination (R27.8)  · Difficulty in walking, Not elsewhere classified (R26.2)  · Other abnormalities of gait and mobility (R26.89)  · Unspecified Lack of Coordination (R27.9)  · Hemiplegia and hemiparesis following cerebral infarction affecting   · left non-dominant side (J50.005)   Precaution/Allergies:  Patient has no known allergies. ASSESSMENT:     Mr. Papito Ellison is a 53year old admitted with acute CVA with left hemiparesis and left inattention. Patient is supine upon contact and agreeable to PT treatment. The patient rolled with min A and performed supine to sit with min A and verbal cues. He demonstrated good static sitting balance at the edge of the bed during approximation of the LUE. The patient scooted forward with min A and performed sit to stand with min Ax2 to the hemiwalker. The patient demonstrated fair static standing balance with min Ax2 for ~2min during ADLs. The patient sat with min Ax2 and took a seated rest break. The patient then participated in 2 more sit to stands with the same and instruction on weight shifting and hip hiking technique. The patient then participated in a stand pivot transfer with mod -max Ax2 to the chair. The patient was then positioned for comfort with needs in reach. Overall the patient is making slow progress toward therapy goals. The patient is very motivated and works hard throughout. Will continue skilled PT treatment as patient is still below functional baseline. This section established at most recent assessment   PROBLEM LIST (Impairments causing functional limitations):  1. Decreased Strength  2. Decreased ADL/Functional Activities  3. Decreased Transfer Abilities  4. Decreased Ambulation Ability/Technique  5. Decreased Balance  6. Increased Pain  7. Decreased Activity Tolerance  8. Increased Fatigue  9. Decreased Flexibility/Joint Mobility   INTERVENTIONS PLANNED: (Benefits and precautions of physical therapy have been discussed with the patient.)  1. Balance Exercise  2. Bed Mobility  3. Family Education  4. Gait Training  5. Home Exercise Program (HEP)  6. Manual Therapy  7. Neuromuscular Re-education/Strengthening  8. Range of Motion (ROM)  9.  Therapeutic Activites  10. Therapeutic Exercise/Strengthening  11. Transfer Training     TREATMENT PLAN: Frequency/Duration: 3 times a week for duration of hospital stay  Rehabilitation Potential For Stated Goals: Good     REHAB RECOMMENDATIONS (at time of discharge pending progress):    Placement: It is my opinion, based on this patient's performance to date, that Mr. Leonid Baumann may benefit from intensive therapy at an [address] [place] [address] after discharge due to a probable need for 24 hour rehab nursing, a probable need for multiple therapy disciplines, and potential to make ongoing and sustainable functional improvement that is of practical value. .  Equipment:    TBD pending progress with therapy. HISTORY:   History of Present Injury/Illness (Reason for Referral):  Per H&P: \"Pt is a 52 y/o smoker with DM, HTN, who presented to ER with L leg and arm weakness, L facial droop, dysarthria. First noted L leg weakness late night 12/11 when he woke up to go to the bathroom. He was normal when he went to bed around 1030. Woke up this morning and had persistent weakness L leg and also now noted in L arm. EMS called. Noted to have slurred speech and L facial droop as well. Code S called in ER around 9am.  MRI with acute infarcts in L cerebellar hemisphere, deep frontoparietal white matter, and R paramedian yariel. Also noted old lacunar infarcts. No large vessel occlusion on CTA, but some stenosis noted. No hx afib, TIA, CVA. No CP, palpitations, SOB. \"  Past Medical History/Comorbidities:   Mr. Leonid Baumann  has a past medical history of Acute ischemic stroke ([address] (12/12/2019), Acute pancreatitis (11/19/2014), Cerebrovascular accident (CVA) due to embolism ([address] (12/12/2019), Diabetes ([address] (2002), Diabetes ([address] Diabetes mellitus, and Hypertension.  He also has no past medical history of Arthritis, Asthma, Autoimmune disease ([address] CAD (coronary artery disease), Cancer ([address] Chronic kidney disease, COPD, Dementia, Dementia ([facility] Heart failure ([facility] Ill-defined condition, Infectious disease, Liver disease, Other ill-defined conditions(799.89), Psychiatric disorder, PUD (peptic ulcer disease), Seizures ([facility] or Sleep disorder. Mr. Karlos Alvarenga  has a past surgical history that includes hx hernia repair and hx orthopaedic. Social History/Living Environment:   Home Environment: Apartment  # Steps to Enter: 12  Rails to Enter: Yes  [employer] : Bilateral  One/Two Story Residence: One story  Living Alone: No  Support Systems: Spouse/Significant Other/Partner  Patient Expects to be Discharged to[de-identified] Unknown  Current DME Used/Available at Home: None  Tub or Shower Type: Tub/Shower combination  Prior Level of Function/Work/Activity:  Independent, lives with wife in 2nd story 1 level apartment. No recent falls. Number of Personal Factors/Comorbidities that affect the Plan of Care: 1-2: MODERATE COMPLEXITY   EXAMINATION:   Most Recent Physical Functioning:   Gross Assessment:                  Posture:     Balance:  Sitting: Impaired  Sitting - Static: Good (unsupported)  Sitting - Dynamic: Fair (occasional)  Standing: Impaired  Standing - Static: Fair;Poor  Standing - Dynamic : Poor Bed Mobility:  Rolling: Minimum assistance  Supine to Sit: Minimum assistance  Scooting: Minimum assistance  Wheelchair Mobility:     Transfers:  Sit to Stand: Minimum assistance;Assist x2  Stand to Sit: Minimum assistance;Assist x2  Stand Pivot Transfers: Moderate assistance;Maximum assistance;Assist x2  Interventions: Safety awareness training;Verbal cues  Gait:            Body Structures Involved:  1. Nerves  2. Voice/Speech  3. Bones  4. Joints  5. Muscles Body Functions Affected:  1. Mental  2. Sensory/Pain  3. Neuromusculoskeletal  4. Movement Related Activities and Participation Affected:  1. General Tasks and Demands  2. Mobility  3. Self Care  4.  Interpersonal Interactions and Relationships   Number of elements that affect the Plan of Care: 4+: HIGH COMPLEXITY   CLINICAL PRESENTATION:   Presentation: Evolving clinical presentation with changing clinical characteristics: MODERATE COMPLEXITY   CLINICAL DECISION MAKIN [facility] Inpatient Short Form  How much difficulty does the patient currently have. .. Unable A Lot A Little None   1. Turning over in bed (including adjusting bedclothes, sheets and blankets)? [] 1   [] 2   [x] 3   [] 4   2. Sitting down on and standing up from a chair with arms ( e.g., wheelchair, bedside commode, etc.)   [] 1   [x] 2   [] 3   [] 4   3. Moving from lying on back to sitting on the side of the bed? [] 1   [x] 2   [] 3   [] 4   How much help from another person does the patient currently need. .. Total A Lot A Little None   4. Moving to and from a bed to a chair (including a wheelchair)? [] 1   [x] 2   [] 3   [] 4   5. Need to walk in hospital room? [] 1   [x] 2   [] 3   [] 4   6. Climbing 3-5 steps with a railing? [] 1   [x] 2   [] 3   [] 4   © , Trustees of [address] [place] [address], under license to [employer]. All rights reserved      Score:  Initial: 13 Most Recent: 13 (Date:19 )    Interpretation of Tool:  Represents activities that are increasingly more difficult (i.e. Bed mobility, Transfers, Gait). Medical Necessity:     · Patient is expected to demonstrate progress in   · strength, range of motion, balance, coordination, and functional technique  ·  to   · increase independence with all mobility. · .  Reason for Services/Other Comments:  · Patient continues to require skilled intervention due to   · medical complications and mobility deficits which impact his level of function, safety, and independence as indicated above.    · .   Use of outcome tool(s) and clinical judgement create a POC that gives a: Questionable prediction of patient's progress: MODERATE COMPLEXITY        TREATMENT:   (In addition to Assessment/Re-Assessment sessions the following treatments were rendered)   Pre-treatment Symptoms/Complaints:  none  Pain: Initial:   Pain Intensity 1: 0  Post Session:  none     REASSESSMENT    Therapeutic Activity: (    17 min): Therapeutic activities including bed mobility, rolling, scooting, and sit <> stand transfer training, weight shifting, and hip hiking to improve mobility, strength, balance, and coordination. Required moderate cues with minimal physical assist x2   to promote coordination of bilateral, upper extremity(s), lower extremity(s) and promote motor control of bilateral, upper extremity(s), lower extremity(s). Braces/Orthotics/Lines/Etc:   · O2 Device: Room Air   Treatment/Session Assessment:    · Response to Treatment:  see above. Works very hard with therapy. · Interdisciplinary Collaboration:   o Physical Therapy Assistant  o Registered Nurse  · After treatment position/precautions:   o Up in chair  o Bed alarm/tab alert on  o Bed/Chair-wheels locked  o Bed in low position  o Call light within reach  o RN notified   · Compliance with Program/Exercises: Compliant all of the time  · Recommendations/Intent for next treatment session: \"Next visit will focus on advancements to more challenging activities and reduction in assistance provided\".     Total Treatment Duration:  PT Patient Time In/Time Out  Time In: 0768  Time Out: [address] [place], [place]

## 2020-01-13 NOTE — PROGRESS NOTES
01/13/20 0710   NIH Stroke Scale   Interval Other (comment)  (Neuro checks)   LOC 0   LOC Questions 0   LOC Commands 0   Best Gaze 0   Visual 0   Facial Palsy 1   Motor Right Arm 0   Motor Left Arm 4   Motor Right Leg 0   Motor Left Leg 4   Limb Ataxia 0   Sensory 0   Best Language 1   Dysarthria 1   Extinction and Inattention 0   Total 11     Dual NIH with Francisca EDWARDS

## 2020-01-14 LAB
GLUCOSE BLD STRIP.AUTO-MCNC: 101 MG/DL (ref 65–100)
GLUCOSE BLD STRIP.AUTO-MCNC: 103 MG/DL (ref 65–100)
GLUCOSE BLD STRIP.AUTO-MCNC: 87 MG/DL (ref 65–100)
GLUCOSE BLD STRIP.AUTO-MCNC: 94 MG/DL (ref 65–100)

## 2020-01-14 PROCEDURE — 92526 ORAL FUNCTION THERAPY: CPT

## 2020-01-14 PROCEDURE — 74011250637 HC RX REV CODE- 250/637: Performed by: INTERNAL MEDICINE

## 2020-01-14 PROCEDURE — 74011636637 HC RX REV CODE- 636/637: Performed by: INTERNAL MEDICINE

## 2020-01-14 PROCEDURE — 65270000029 HC RM PRIVATE

## 2020-01-14 PROCEDURE — 99232 SBSQ HOSP IP/OBS MODERATE 35: CPT | Performed by: PHYSICAL MEDICINE & REHABILITATION

## 2020-01-14 PROCEDURE — 65660000000 HC RM CCU STEPDOWN

## 2020-01-14 PROCEDURE — 82962 GLUCOSE BLOOD TEST: CPT

## 2020-01-14 PROCEDURE — 92507 TX SP LANG VOICE COMM INDIV: CPT

## 2020-01-14 PROCEDURE — 74011250636 HC RX REV CODE- 250/636: Performed by: INTERNAL MEDICINE

## 2020-01-14 PROCEDURE — 92523 SPEECH SOUND LANG COMPREHEN: CPT

## 2020-01-14 PROCEDURE — 74011250637 HC RX REV CODE- 250/637: Performed by: HOSPITALIST

## 2020-01-14 RX ADMIN — LISINOPRIL 40 MG: 20 TABLET ORAL at 08:25

## 2020-01-14 RX ADMIN — ATORVASTATIN CALCIUM 80 MG: 80 TABLET, FILM COATED ORAL at 00:31

## 2020-01-14 RX ADMIN — ACETAMINOPHEN 650 MG: 325 TABLET, FILM COATED ORAL at 00:31

## 2020-01-14 RX ADMIN — ACETAMINOPHEN 650 MG: 325 TABLET, FILM COATED ORAL at 18:15

## 2020-01-14 RX ADMIN — INSULIN GLARGINE 10 UNITS: 100 INJECTION, SOLUTION SUBCUTANEOUS at 21:36

## 2020-01-14 RX ADMIN — GUAIFENESIN 100 MG: 100 SOLUTION ORAL at 00:31

## 2020-01-14 RX ADMIN — ENOXAPARIN SODIUM 40 MG: 40 INJECTION SUBCUTANEOUS at 14:19

## 2020-01-14 RX ADMIN — GUAIFENESIN 100 MG: 100 SOLUTION ORAL at 18:15

## 2020-01-14 RX ADMIN — ATORVASTATIN CALCIUM 80 MG: 80 TABLET, FILM COATED ORAL at 21:36

## 2020-01-14 RX ADMIN — ASPIRIN 81 MG 81 MG: 81 TABLET ORAL at 08:25

## 2020-01-14 RX ADMIN — INSULIN GLARGINE 10 UNITS: 100 INJECTION, SOLUTION SUBCUTANEOUS at 00:38

## 2020-01-14 RX ADMIN — METOPROLOL SUCCINATE 25 MG: 25 TABLET, FILM COATED, EXTENDED RELEASE ORAL at 08:25

## 2020-01-14 NOTE — PROGRESS NOTES
01/14/20 1840   NIH Stroke Scale   Interval Other (comment)  (Dual NIH with Myesha Jiménez RN)   LOC 0   LOC Questions 0   LOC Commands 0   Best Gaze 0   Visual 0   Facial Palsy 1   Motor Right Arm 0   Motor Left Arm 4   Motor Right Leg 0   Motor Left Leg 4   Limb Ataxia 0   Sensory 0   Best Language 1   Dysarthria 1   Extinction and Inattention 0   Total 11

## 2020-01-14 NOTE — PROGRESS NOTES
01/13/20 2002   NIH Stroke Scale   Interval Other (comment)   LOC 0   LOC Questions 0   LOC Commands 0   Best Gaze 0   Visual 0   Facial Palsy 1   Motor Right Arm 0   Motor Left Arm 4   Motor Right Leg 0   Motor Left Leg 4   Limb Ataxia 0   Sensory 0   Best Language 1   Dysarthria 1   Extinction and Inattention 0   Total 11

## 2020-01-14 NOTE — PROGRESS NOTES
01/14/20 0000   NIH Stroke Scale   Interval Other (comment)  (neuro checks)   LOC 0   LOC Questions 0   LOC Commands 0   Motor Right Arm 0   Motor Left Arm 4   Motor Right Leg 0   Motor Left Leg 4   Dysarthria 1     Neuro checks

## 2020-01-14 NOTE — PROGRESS NOTES
Dysphagia(re evaluation 1/7/2020)  LTG: Patient will tolerate least restrictive diet without overt signs or symptoms of airway compromise. STG: Patient will tolerate mechanical soft diet(ground meats/chopped vegetables) and thin liquids by cup without overt signs or symptoms of airway compromise. STG: Patient will demonstrate use of compensatory strategies to improve swallow safety/function with 90% accuracy given minimal cueing  STG: Patient participate in exercises to improve oral motor movement with 80% accuracy given minimal cueing  STG: Patient will participate in modified barium swallow study as clinically indicated. Neurolinguistic Goals:  LTG: Patient will increase neuro-linguistic abilities to increase safety and awareness in functional living environment   STG: Patient will participate in speech/language/cognitive evaluation.  MET 12/17/19  STG: Patient will solve mathematical problems with 80%accuracy given minimal cueing   STG: Patient will name 10 items to concrete category in 1 minute given minimal cueing  STG: Patient will participate in verbal reasoning tasks with 80% accuracy given minimal cueing  STG: Patient will complete mental manipulation tasks with 80% accuracy given minimal cueing  STG: Patient will complete working memory/attention tasks with 80% accuracy given minimal cueing  STG: Patient will recall relevant verbal information with 80% accuracy with minimal assistance  STG: Patient will utilize memory compensatory strategies to improve recall of functional list/message with 90%accuracy given minimal assistance  STG: Patient will participate in ongoing cognitive linguistic evaluation for therapeutic assessment     SPEECH LANGUAGE PATHOLOGY: DYSPHAGIA and SPEECH/LANGUAGE- Daily Note 2    NAME/AGE/GENDER: Austin Lo is a [address] y.o. male  DATE: 1/14/2020  PRIMARY DIAGNOSIS: Acute ischemic stroke ([facility] [I63.9]  Cerebrovascular accident (CVA) due to embolism ([facility] [I63.9]      ICD-10: Treatment Diagnosis: R13.12 Dysphagia, Oropharyngeal Phase    INTERDISCIPLINARY COLLABORATION: Registered Nurse and Physician  PRECAUTIONS/ALLERGIES: Patient has no known allergies. SUBJECTIVE   Patient alert and upright in bed for session. Continue to have flat affect. Orientation:   Person  Place  Time  Situation    Pain: Pain Scale 1: Numeric (0 - 10)  Pain Intensity 1: 0     OBJECTIVE   Swallowing:   Patient completed the following laryngeal strengthening exercises to maximize swallow function:    Effortful swallow: 2 sets of 15 reps (with puree bolus to increase resistance) in order to increase hyolaryngeal elevation and laryngeal closure  -Attempted Mendelsohn Maneuver; however, patient unable to complete exercise accurately despite multiple attempts with verbal/visual cues. Speech/cognitive treatment:  Functional Memory: patient recalled 3/4 items consumed for lunch meal this PM and 2/3 compensatory strategies for improving speech intelligibility. Recalled recommendation for no straws with liquids; however, unable to recall reason for avoiding straw use. Executive function: patient generated grocery list (verbally) for preparing single meal of choice naming 7 items he would need in order to prepare fried liver, biscuits, and fries. Reasoning: patient sequenced 8 step sequence for preparing fried liver with 100% accuracy independently. ASSESSMENT   Swallowing: Completed laryngeal strengthening exercises with minimal cues. Continue to recommend  mechanical soft diet -ground meats/chopped vegetables and thin liquids by cup only. NO Straws. Medications crushed in puree. Small bites/sips, slow rate of intake, and alternate solids/liquids. Fully upright and alert for all po intake. Speech/Cognitive Function: Patient continues to have moderate dysarthria with minimal carryover of compensatory strategies for improving speech intelligibility in conversation.  Vocal intensity significantly improved and WNL. Speech intelligible in conversation approximately 70% of the time to familiar listener. Improved reasoning noted in functional sequencing tasks. Patient continues to exhibit reduced short term memory. Will continue to follow for speech and cognitive linguistic treatment. Tool Used: Dysphagia Outcome and Severity Scale (ROCHELLE)    Score Comments   Normal Diet  [] 7 With no strategies or extra time needed   Functional Swallow  [] 6 May have mild oral or pharyngeal delay   Mild Dysphagia  [] 5 Which may require one diet consistency restricted    Mild-Moderate Dysphagia  [] 4 With 1-2 diet consistencies restricted   Moderate Dysphagia  [] 3 With 2 or more diet consistencies restricted   Moderate-Severe Dysphagia  [] 2 With partial PO strategies (trials with ST only)   Severe Dysphagia  [] 1 With inability to tolerate any PO safely      Score:  Initial:4 Most Recent: 4 (Date 01/14/20 )   Interpretation of Tool: The Dysphagia Outcome and Severity Scale (ROCHELLE) is a simple, easy-to-use, 7-point scale developed to systematically rate the functional severity of dysphagia based on objective assessment and make recommendations for diet level, independence level, and type of nutrition. PLAN    FREQUENCY/DURATION: Continue to follow patient 3 times a week for duration of hospital stay to address above goals. - Recommendations for next treatment session: Next treatment will address dysarthria, dysphagia, cognition     REHABILITATION POTENTIAL FOR STATED GOALS: Good     COMPLIANCE WITH PROGRAM/EXERCISES: Will assess as treatment progresses    CONTINUATION OF SKILLED SERVICES/MEDICAL NECESSITY:   Patient is expected to demonstrate progress in  swallow strength, swallow timeliness, swallow function, diet tolerance and swallow safety in order to  improve swallow safety, work toward diet advancement and decrease aspiration risk.    Patient continues to require skilled intervention due to dysphagia. RECOMMENDATIONS   DIET:    PO:  Mechanical soft with ground meat/chopped vegatables   Liquids:  regular thin    MEDICATIONS: Crushed in puree     ASPIRATION PRECAUTIONS  · Slow rate of intake  · Small bites/sips  · Upright at 90 degrees during meal     COMPENSATORY STRATEGIES/MODIFICATIONS  · Alternate liquids/solids  · Small sips and bites  · Slow rate     EDUCATION:  · Recommendations discussed with Nursing  · Patient     RECOMMENDATIONS for CONTINUED SPEECH THERAPY: YES: Anticipate need for ongoing speech therapy during this hospitalization and at next level of care.          SAFETY:  After treatment position/precautions:  · Upright in bed  · Call light within reach  · Hospitalist notified    Total Treatment Duration:   Time In: 4119  Time Out: Airam Wiggins CCC-SLP

## 2020-01-14 NOTE — PROGRESS NOTES
Hospitalist Progress Note     Admit Date:  2019  9:07 AM   Name:  Yo Notice   Age:  53 y.o.  :  1975   MRN:  [id number]   PCP:  Yarely Milelr MD      Subjective:     52 y/o smoker with DM, HTN, who presented to ER  with L leg and arm weakness, L facial droop, dysarthria. First noted L leg weakness late night  when he woke up to go to the bathroom. He was normal when he went to bed around 1030. EMS called. Noted to have slurred speech and L facial droop as well. Code S called in ER around 9am.  MRI with acute infarcts in L cerebellar hemisphere, deep frontoparietal white matter, and R paramedian yariel. Also noted old lacunar infarcts. No large vessel occlusion on CTA, but some stenosis noted. He was started on asa, statins. Neurology consulted. An ECHO showed PFO. Plan for cardiology referral and evaluate for closure as outpatient. PT/OT suggested inpatient rehabilitation. The patient is uninsured and the only option at this moment is IRC. Today, L weakness unchanged, No ac events, no significant resp spx, tolerates po fine, no change    Objective:     Patient Vitals for the past 24 hrs:   Temp Pulse Resp BP SpO2   20 1600 98.5 °F (36.9 °C) 66 17 104/76 99 %   20 1200 98.3 °F (36.8 °C) 68 20 121/74 97 %   20 0800 98 °F (36.7 °C) 66 17 125/86 96 %   20 0400 98.1 °F (36.7 °C) 74 17 129/87 98 %   20 0000 97.9 °F (36.6 °C) 80 17 111/84 93 %   20 2000 98 °F (36.7 °C) 72 17 114/84 96 %     Oxygen Therapy  O2 Sat (%): 99 % (20 1600)  Pulse via Oximetry: 56 beats per minute (20)  O2 Device: Room air (20 193)  No intake or output data in the 24 hours ending 20 1710      REVIEW OF SYSTEMS: Comprehensive ROS performed and negative except as stated in HPI. Physical Examination:  General:          Well nourished. Alert and oriented. Mild distress  CV:                  RRR. No murmur, rub, or gallop.     Lungs: Clear to auscultation bilaterally. No wheezing, rhonchi, or rales  Extremities:     Warm and dry. No cyanosis or edema. Neurologic:      CN II-XII intact except for mild L facial droop. Sensation intact. PERRLA.  dysarthria. No                          confusion. STR o/5 LUE and LLE. Dysarthria noted. Skin:                No rashes or jaundice. No wounds. Psych:             Appears depressed    Data Review:  I have reviewed all labs, meds, telemetry events, and studies from the last 24 hours.     Recent Results (from the past 24 hour(s))   GLUCOSE, POC    Collection Time: 01/13/20  9:02 PM   Result Value Ref Range    Glucose (POC) 102 (H) 65 - 100 mg/dL   GLUCOSE, POC    Collection Time: 01/14/20  7:00 AM   Result Value Ref Range    Glucose (POC) 87 65 - 100 mg/dL   GLUCOSE, POC    Collection Time: 01/14/20 11:19 AM   Result Value Ref Range    Glucose (POC) 103 (H) 65 - 100 mg/dL   GLUCOSE, POC    Collection Time: 01/14/20  3:36 PM   Result Value Ref Range    Glucose (POC) 94 65 - 100 mg/dL        All Micro Results     None          Current Meds:  Current Facility-Administered Medications   Medication Dose Route Frequency    insulin lispro (HUMALOG) injection   SubCUTAneous TIDAC    metoprolol succinate (TOPROL-XL) XL tablet 25 mg  25 mg Oral DAILY    polyethylene glycol (MIRALAX) packet 17 g  17 g Oral DAILY PRN    insulin glargine (LANTUS) injection 10 Units  10 Units SubCUTAneous QHS    guaiFENesin (ROBITUSSIN) 100 mg/5 mL oral liquid 100 mg  100 mg Oral Q4H PRN    hydrALAZINE (APRESOLINE) 20 mg/mL injection 20 mg  20 mg IntraVENous Q4H PRN    atorvastatin (LIPITOR) tablet 80 mg  80 mg Oral QHS    lisinopril (PRINIVIL, ZESTRIL) tablet 40 mg  40 mg Oral DAILY    sodium chloride (NS) flush 5-40 mL  5-40 mL IntraVENous PRN    ondansetron (ZOFRAN) injection 4 mg  4 mg IntraVENous Q4H PRN    aspirin chewable tablet 81 mg  81 mg Oral DAILY    acetaminophen (TYLENOL) tablet 650 mg  650 mg Oral Q4H PRN    enoxaparin (LOVENOX) injection 40 mg  40 mg SubCUTAneous Q24H    dextrose 40% (GLUTOSE) oral gel 1 Tube  15 g Oral PRN    glucagon (GLUCAGEN) injection 1 mg  1 mg IntraMUSCular PRN    dextrose (D50W) injection syrg 12.5-25 g  25-50 mL IntraVENous PRN       Diet:  DIET NUTRITIONAL SUPPLEMENTS  DIET DIABETIC CONSISTENT CARB    Other Studies (last 24 hours):  No results found. Assessment and Plan:     Hospital Problems as of 1/14/2020 Date Reviewed: 3/3/2017          Codes Class Noted - Resolved POA    PFO (patent foramen ovale) ICD-10-CM: Q21.1  ICD-9-CM: 745.5  12/17/2019 - Present Yes        Arrhythmia ICD-10-CM: I49.9  ICD-9-CM: 427.9  12/15/2019 - Present Yes        Hyperlipemia ICD-10-CM: E78.5  ICD-9-CM: 272.4  12/15/2019 - Present Yes        * (Principal) Acute embolic stroke [facility]) CMI-30-EE: I63.9  ICD-9-CM: 434.11  12/12/2019 - Present Yes        Hypertension (Chronic) ICD-10-CM: I10  ICD-9-CM: 401.9  Unknown - Present Yes        Type 2 diabetes mellitus (HCC) (Chronic) ICD-10-CM: E11.9  ICD-9-CM: 250.00  Unknown - Present Yes              A/P:    Acute embolic stroke  Continues to have left sided neglect  Evaluated by cardiology and neurology  -MRI head: with acute infarcts in L cerebellar hemisphere, deep frontoparietal white matter, and R paramedian yariel. Also noted old lacunar infarcts.    -CTA head: No large vessel occlusion   -ISMAEL: 3 mm PFO     Plan:  -Cont statin, ASA  -PT/OT/ST  -Needs outpatient cardiology follow-up for event monitor and PFO closure.      Active Problems:     Hypertension  BP well controlled at this time  Episodes of bradycardia noted     Plan:  -Continue metoprolol; will adjust dose if repeat episodes of bradycardia  -Continue amlodipine     Type 2 diabetes mellitus - cont on Lantus and SSI     Arrhythmia - had brief wide complex tachycardia early in admission, will need event monitor as outpt per cards     Hyperlipemia - statin therapy     PFO (patent foramen colin) - will need closure as outpatient per cardiology     Dysphagia: diet per ST    Dispo; AWAITING  medicaid approval and placement

## 2020-01-14 NOTE — PROGRESS NOTES
01/14/20 0400   NIH Stroke Scale   Interval Other (comment)  (Neuro checks)   LOC 0   LOC Questions 0   LOC Commands 0   Motor Right Arm 0   Motor Left Arm 4   Motor Right Leg 0   Motor Left Leg 4   Dysarthria 1     Neuro checks

## 2020-01-14 NOTE — PROGRESS NOTES
Physical Therapy Note:    Attempted to see patient this PM for physical therapy re-evaluation. Patient was working with another clinician (SLP) for treatment. Will follow and re-attempt as schedule permits/patient available.  Thank you,    Dwayne Burger, PT, DPT  1/14/20

## 2020-01-14 NOTE — PROGRESS NOTES
[facility] REHAB PROGRESS NOTE    Edilberto Dos Santos  Admit Date: 12/12/2019  Admit Diagnosis: Acute ischemic stroke ([facility] [I63.9]; Cerebrovascular accident (CVA) due to embolism ([facility] [I63.9]    Subjective     PT, OT tolerated well. Patient remains very motivated, and has made some gains. Patient appears more alert and appropriate, however the left side remains still essentially plegic. PT/OT report notes improved function however still requires min/max assist of 2 for transfers. Objective:     Current Facility-Administered Medications   Medication Dose Route Frequency    insulin lispro (HUMALOG) injection   SubCUTAneous TIDAC    metoprolol succinate (TOPROL-XL) XL tablet 25 mg  25 mg Oral DAILY    polyethylene glycol (MIRALAX) packet 17 g  17 g Oral DAILY PRN    insulin glargine (LANTUS) injection 10 Units  10 Units SubCUTAneous QHS    guaiFENesin (ROBITUSSIN) 100 mg/5 mL oral liquid 100 mg  100 mg Oral Q4H PRN    hydrALAZINE (APRESOLINE) 20 mg/mL injection 20 mg  20 mg IntraVENous Q4H PRN    atorvastatin (LIPITOR) tablet 80 mg  80 mg Oral QHS    lisinopril (PRINIVIL, ZESTRIL) tablet 40 mg  40 mg Oral DAILY    sodium chloride (NS) flush 5-40 mL  5-40 mL IntraVENous PRN    ondansetron (ZOFRAN) injection 4 mg  4 mg IntraVENous Q4H PRN    aspirin chewable tablet 81 mg  81 mg Oral DAILY    acetaminophen (TYLENOL) tablet 650 mg  650 mg Oral Q4H PRN    enoxaparin (LOVENOX) injection 40 mg  40 mg SubCUTAneous Q24H    dextrose 40% (GLUTOSE) oral gel 1 Tube  15 g Oral PRN    glucagon (GLUCAGEN) injection 1 mg  1 mg IntraMUSCular PRN    dextrose (D50W) injection syrg 12.5-25 g  25-50 mL IntraVENous PRN       Visit Vitals  /86 (BP 1 Location: Right arm, BP Patient Position: At rest)   Pulse 66   Temp 98 °F (36.7 °C)   Resp 17   Ht 5' 6\" (1.676 m)   Wt 210 lb (95.3 kg)   SpO2 96%   BMI 33.89 kg/m²        Review of Systems: +antalgic gait.  Denies chest pain, shortness of breath, cough, headache, visual problems, abdominal pain, dysurea, calf pain. Pertinent positives are as noted in the medical records and unremarkable otherwise. Physical Exam:   General: Alert and oriented. No acute cardio respiratory distress. HEENT: + L droop ,no scleral icterus  Oral mucosa moist without cyanosis, No bruit, No JVD. Lungs: Clear to auscultation   Heart: Regular rate and rhythm, S1, S2   No  murmurs    Abdomen: Soft, non-tender, nondistended. BS+   Genitourinary: defer   Neuromuscular:        PERRL, EOMI  + dysarthria. Follows simple commands consistently. Able to identify, recall repeat. LUE     Shoulder abduction  1 /5              Elbow flexion:  0 /5               Wrist extension:  0 /5              Finger flexion;   1/5  RUE    Shoulder abduction: 5 /5                Elbow flexion:   5/5               Wrist extension:5/5              Finger flexion:  5/5  LLE     Hip flexion:  0 /5              Knee extension:  0 /5               Ankle dorsiflexion: 0  /5              Ankle plantarflexion:  0 /5                                 RLE     Hip flexion:  5 /5              Knee extension:  5 /5               Ankle dorsiflexion:  5 /5              Ankle plantarflexion:   5/5  Sensory - intact to touch,proprioception. Skin/extremity: No rashes, no erythema. Calf non tender BLE.                                                                                                                                 Functional Assessment:  Gross Assessment  AROM: (R UE WFL; L UE non-functional AROM) (12/15/19 1200)  PROM: Generally decreased, functional(L UE) (12/15/19 1200)  Strength: (R UE WFL; L UE non-functional; flaccid) (12/15/19 1200)  Coordination: (non-functional in the L UE) (12/15/19 1200)  Tone: Abnormal(hypotonic in L UE) (12/15/19 1200)  Sensation: Intact(reports intact grossly to light touch) (12/15/19 1200)   Bed Mobility  Rolling: Minimum assistance (01/13/20 0930)  Supine to Sit: Minimum assistance (01/13/20 1487)  Sit to Supine: Moderate assistance (01/08/20 1440)  Scooting: Minimum assistance (01/13/20 0947)   Balance  Sitting: Impaired (01/13/20 0947)  Sitting - Static: Good (unsupported) (01/13/20 0947)  Sitting - Dynamic: Fair (occasional) (01/13/20 0947)  Standing: Impaired (01/13/20 0947)  Standing - Static: Fair;Poor (01/13/20 0947)  Standing - Dynamic : Poor (01/13/20 0947)   Grooming  Grooming Assistance: Stand-by assistance (01/07/20 1005)  Position Performed: Seated edge of bed (01/07/20 1005)  Washing Face: Stand-by assistance (01/07/20 1005)  Washing Hands: Maximum assistance (01/07/20 1005)           Bed/Mat Mobility  Rolling: Minimum assistance (01/13/20 0947)  Supine to Sit: Minimum assistance (01/13/20 0947)  Sit to Supine: Moderate assistance (01/08/20 1440)  Sit to Stand: Minimum assistance;Assist x2 (01/13/20 0947)  Stand to Sit: Minimum assistance;Assist x2 (01/13/20 0947)  Bed to Chair: Moderate assistance;Assist x2 (12/30/19 1300)  Scooting: Minimum assistance (01/13/20 0947)     Gaston Granger Fall Risk Assessment:  Gaston Granger Fall Risk  Mobility: Ambulates or transfers with assist devices or assistance (01/14/20 0800)  Mobility Interventions: Bed/chair exit alarm;Communicate number of staff needed for ambulation/transfer;OT consult for ADLs; Patient to call before getting OOB;PT Consult for mobility concerns;PT Consult for assist device competence (01/14/20 0800)  Mentation: Alert, oriented x 3 (01/14/20 0800)  Mentation Interventions: Bed/chair exit alarm;Evaluate medications/consider consulting pharmacy (01/14/20 0400)  Medication: Patient receiving anticonvulsants, sedatives(tranquilizers), psychotropics or hypnotics, hypoglycemics, narcotics, sleep aids, antihypertensives, laxatives, or diuretics (01/14/20 0800)  Medication Interventions: Bed/chair exit alarm;Evaluate medications/consider consulting pharmacy; Patient to call before getting OOB (01/14/20 0800)  Elimination: Needs assistance with toileting (01/14/20 0800)  Elimination Interventions: Bed/chair exit alarm;Call light in reach; Patient to call for help with toileting needs (01/14/20 0800)  Prior Fall History: BEFORE admission in past 12 months AND DURING admission (01/14/20 0800)  History of Falls Interventions: Bed/chair exit alarm; Consult care management for discharge planning;Evaluate medications/consider consulting pharmacy; Door open when patient unattended (01/14/20 0800)  Total Score: 6 (01/14/20 0800)  Standard Fall Precautions: Yes (01/03/20 0800)  High Fall Risk: Yes (01/14/20 0800)     Speech Assessment:         Ambulation:  Gait  Base of Support: Center of gravity altered;Narrowed; Shift to right (12/13/19 7424)  Speed/Clotilde: Delayed; Shuffled; Slow (12/13/19 0899)  Step Length: Left shortened;Right shortened (12/13/19 5123)  Gait Abnormalities: Decreased step clearance; Hemiplegic;Trunk sway increased (12/13/19 1210)  Ambulation - Level of Assistance:  Moderate assistance;Assist x2 (12/13/19 8243)  Distance (ft): 5 Feet (ft)(side steps edge of bed) (12/13/19 0852)  Assistive Device: [address], rolling;Gait belt (12/13/19 9262)     Labs/Studies:  Recent Results (from the past 72 hour(s))   GLUCOSE, POC    Collection Time: 01/11/20 12:04 PM   Result Value Ref Range    Glucose (POC) 88 65 - 100 mg/dL   GLUCOSE, POC    Collection Time: 01/11/20  3:42 PM   Result Value Ref Range    Glucose (POC) 90 65 - 100 mg/dL   GLUCOSE, POC    Collection Time: 01/11/20 10:47 PM   Result Value Ref Range    Glucose (POC) 85 65 - 100 mg/dL   GLUCOSE, POC    Collection Time: 01/12/20  7:35 AM   Result Value Ref Range    Glucose (POC) 93 65 - 100 mg/dL   GLUCOSE, POC    Collection Time: 01/12/20 11:32 AM   Result Value Ref Range    Glucose (POC) 93 65 - 100 mg/dL   GLUCOSE, POC    Collection Time: 01/12/20  3:44 PM   Result Value Ref Range    Glucose (POC) 84 65 - 100 mg/dL   GLUCOSE, POC    Collection Time: 01/12/20  8:17 PM   Result Value Ref Range    Glucose (POC) 100 65 - 100 mg/dL   GLUCOSE, POC    Collection Time: 01/13/20  7:09 AM   Result Value Ref Range    Glucose (POC) 76 65 - 100 mg/dL   GLUCOSE, POC    Collection Time: 01/13/20 11:35 AM   Result Value Ref Range    Glucose (POC) 102 (H) 65 - 100 mg/dL   GLUCOSE, POC    Collection Time: 01/13/20  3:56 PM   Result Value Ref Range    Glucose (POC) 94 65 - 100 mg/dL   GLUCOSE, POC    Collection Time: 01/13/20  9:02 PM   Result Value Ref Range    Glucose (POC) 102 (H) 65 - 100 mg/dL   GLUCOSE, POC    Collection Time: 01/14/20  7:00 AM   Result Value Ref Range    Glucose (POC) 87 65 - 100 mg/dL       Assessment:     Problem List as of 1/14/2020 Date Reviewed: 3/3/2017          Codes Class Noted - Resolved    PFO (patent foramen ovale) ICD-10-CM: Q21.1  ICD-9-CM: 745.5  12/17/2019 - Present        Arrhythmia ICD-10-CM: I49.9  ICD-9-CM: 427.9  12/15/2019 - Present        Hyperlipemia ICD-10-CM: E78.5  ICD-9-CM: 272.4  12/15/2019 - Present        * (Principal) Acute embolic stroke [facility] ILT-26-PX: I63.9  ICD-9-CM: 434.11  12/12/2019 - Present        Microcytic anemia ICD-10-CM: D50.9  ICD-9-CM: 280.9  11/20/2014 - Present        Acute pancreatitis ICD-10-CM: K85.90  ICD-9-CM: 577.0  11/19/2014 - Present        GERD (gastroesophageal reflux disease) (Chronic) ICD-10-CM: K21.9  ICD-9-CM: 530.81  11/19/2014 - Present        Hypertension (Chronic) ICD-10-CM: I10  ICD-9-CM: 401.9  Unknown - Present        Type 2 diabetes mellitus (HCC) (Chronic) ICD-10-CM: E11.9  ICD-9-CM: 250.00  Unknown - Present              Plan:     Acute CVA -  left  hemiplegia, dysarthria, cognitive/ linguistic deficits. - aspirin, lipitor, risk factor management. - cardiology f/u for PFO/ R-L shunt  - minimal improvement in function. However still 2 person assist with mobility and transfers. Unable to take effective steps. - Acute PT, OT to continue and work on left sided neuro deficits, adaptive mobility, techniques.  Continue balance activities, transfer training.   - At some point he will need a WC,   - No significant tone, spasticity LUE to require a splint. Needs PRAFO LE.    - Patient will need prolonged skilled rehab program/ skilled. Discharge is unrealistic in near future. Case management to continue to work on discharge plan. Will continue to follow.        Signed By: Clay Brown MD     January 14, 2020

## 2020-01-14 NOTE — PROGRESS NOTES
01/14/20 5697   NIH Stroke Scale   Interval Other (comment)  (Dual NIH with Satya Ramirez RN)   LOC 0   LOC Questions 0   LOC Commands 0   Best Gaze 0   Visual 0   Facial Palsy 1   Motor Right Arm 0   Motor Left Arm 4   Motor Right Leg 0   Motor Left Leg 4   Limb Ataxia 0   Sensory 0   Best Language 1   Dysarthria 1   Extinction and Inattention 0   Total 11     Dual NIH with Jackie Jacobs

## 2020-01-15 LAB
GLUCOSE BLD STRIP.AUTO-MCNC: 112 MG/DL (ref 65–100)
GLUCOSE BLD STRIP.AUTO-MCNC: 117 MG/DL (ref 65–100)
GLUCOSE BLD STRIP.AUTO-MCNC: 81 MG/DL (ref 65–100)
GLUCOSE BLD STRIP.AUTO-MCNC: 96 MG/DL (ref 65–100)

## 2020-01-15 PROCEDURE — 65270000029 HC RM PRIVATE

## 2020-01-15 PROCEDURE — 92507 TX SP LANG VOICE COMM INDIV: CPT

## 2020-01-15 PROCEDURE — 97110 THERAPEUTIC EXERCISES: CPT

## 2020-01-15 PROCEDURE — 92526 ORAL FUNCTION THERAPY: CPT

## 2020-01-15 PROCEDURE — 82962 GLUCOSE BLOOD TEST: CPT

## 2020-01-15 PROCEDURE — 97530 THERAPEUTIC ACTIVITIES: CPT

## 2020-01-15 PROCEDURE — 74011250636 HC RX REV CODE- 250/636: Performed by: INTERNAL MEDICINE

## 2020-01-15 PROCEDURE — 74011250637 HC RX REV CODE- 250/637: Performed by: HOSPITALIST

## 2020-01-15 PROCEDURE — 74011250637 HC RX REV CODE- 250/637: Performed by: INTERNAL MEDICINE

## 2020-01-15 PROCEDURE — 65660000000 HC RM CCU STEPDOWN

## 2020-01-15 PROCEDURE — 74011636637 HC RX REV CODE- 636/637: Performed by: INTERNAL MEDICINE

## 2020-01-15 RX ORDER — INSULIN GLARGINE 100 [IU]/ML
5 INJECTION, SOLUTION SUBCUTANEOUS
Status: DISCONTINUED | OUTPATIENT
Start: 2020-01-15 | End: 2020-01-16

## 2020-01-15 RX ADMIN — ACETAMINOPHEN 650 MG: 325 TABLET, FILM COATED ORAL at 15:34

## 2020-01-15 RX ADMIN — METOPROLOL SUCCINATE 25 MG: 25 TABLET, FILM COATED, EXTENDED RELEASE ORAL at 09:10

## 2020-01-15 RX ADMIN — ATORVASTATIN CALCIUM 80 MG: 80 TABLET, FILM COATED ORAL at 21:04

## 2020-01-15 RX ADMIN — INSULIN GLARGINE 5 UNITS: 100 INJECTION, SOLUTION SUBCUTANEOUS at 21:42

## 2020-01-15 RX ADMIN — ENOXAPARIN SODIUM 40 MG: 40 INJECTION SUBCUTANEOUS at 15:29

## 2020-01-15 RX ADMIN — GUAIFENESIN 100 MG: 100 SOLUTION ORAL at 15:34

## 2020-01-15 RX ADMIN — GUAIFENESIN 100 MG: 100 SOLUTION ORAL at 21:04

## 2020-01-15 RX ADMIN — ASPIRIN 81 MG 81 MG: 81 TABLET ORAL at 09:10

## 2020-01-15 RX ADMIN — ACETAMINOPHEN 650 MG: 325 TABLET, FILM COATED ORAL at 21:04

## 2020-01-15 RX ADMIN — LISINOPRIL 40 MG: 20 TABLET ORAL at 09:10

## 2020-01-15 RX ADMIN — Medication 5 ML: at 15:29

## 2020-01-15 NOTE — PROGRESS NOTES
01/15/20 0654   NIH Stroke Scale   Interval Other (comment)   LOC 0   LOC Questions 0   LOC Commands 0   Best Gaze 0   Visual 0   Facial Palsy 1   Motor Right Arm 0   Motor Left Arm 4   Motor Right Leg 0   Motor Left Leg 4   Limb Ataxia 0   Sensory 0   Best Language 1   Dysarthria 1   Extinction and Inattention 0   Total 11   Dual NIH chelo/ Michael Horvath

## 2020-01-15 NOTE — PROGRESS NOTES
's follow-up visit to convey care and concern. No needs were voiced during the visit except a request to be kept in prayer.      Dontrell Clark 66  Board Certified

## 2020-01-15 NOTE — PROGRESS NOTES
Problem: Self Care Deficits Care Plan (Adult)  Goal: *Acute Goals and Plan of Care (Insert Text)  Description  1. Patient will complete sitting balance edge of bed with ADL tasks/OT activity with supervision. 2. Patient will demonstrate appropriate safety awareness and protection of L UE during bed mobility and functional transfers with minimal cues. 3. Patient will complete upper body bathing/dressing with minimal assistance to improve participation with ADL. 4. Patient will complete weightbearing into the L UE with ADL tasks with minimal assistance to improve ability to use as a functional assist during ADL tasks. 5. Patient will participation in 8 minutes of therapeutic exercises with moderate assistance to improve strength in the L UE for ADL/functional transfers. 6. Patient will attempt transfer to the chair/BSC within 3 visits to demonstrate advancement with functional transfers. Timeframe: 7 visits   Outcome: Progressing Towards Goal     OCCUPATIONAL THERAPY: Daily Note, AM and PM    1/15/2020  INPATIENT: OT Visit Days: 7  Payor: MEDICAID PENDING / Plan: William Garcia PENDING / Product Type: Medicaid /      NAME/AGE/GENDER: Gifty Johnson is a 53 y.o. male   PRIMARY DIAGNOSIS:  Acute ischemic stroke ([address] [I63.9]  Cerebrovascular accident (CVA) due to embolism ([address] [B92.5] Acute embolic stroke ([address] Acute embolic stroke ([address]       ICD-10: Treatment Diagnosis:    · Generalized Muscle Weakness (M62.81)  · Other lack of cordination (R27.8)  · Hemiplegia and hemiparesis following cerebral infarction affecting   · left non-dominant side (I69.354)  · Abnormal posture (R29.3)   Precautions/Allergies:     Patient has no known allergies. ASSESSMENT:     Mr. Papito Ellison presents to the hospital with L sided weakness and acute ischemic CVA. MRI revealed acute to subacute L cerebellar and R paramedian yariel infarcts.    1/15/2020 Pt presents in supine upon arrival and transferred to sitting with mod a and additional time. Pt sat edge of bed working on increasing balance, lateral weight shifting, and postural alignment. Pt completed sit to stand transfers with mod a x's 2 to increase standing balance and tolerance. Pt required max/total assist with SPT to chair. Pt tolerated PROM to his LUE with some pain with shoulder elevation. Pt was left up in the chair with belongings in reach and posey on.     1/15/2020 Therapist returned this afternoon to assist pt with standing and transfer back to bed. Pt left with nursing staff in room. This section established at most recent assessment   PROBLEM LIST (Impairments causing functional limitations):  1. Decreased Strength  2. Decreased ADL/Functional Activities  3. Decreased Transfer Abilities  4. Decreased Ambulation Ability/Technique  5. Decreased Balance  6. Decreased Activity Tolerance  7. Increased Fatigue  8. Decreased Flexibility/Joint Mobility  9. Decreased Knowledge of Precautions  10. Decreased [place] with Home Exercise Program  11. Decreased Cognition   INTERVENTIONS PLANNED: (Benefits and precautions of occupational therapy have been discussed with the patient.)  1. Activities of daily living training  2. Adaptive equipment training  3. Balance training  4. Clothing management  5. Cognitive training  6. Donning&doffing training  7. Keanu tech training  8. Neuromuscular re-eduation  9. Therapeutic activity  10. Therapeutic exercise     TREATMENT PLAN: Frequency/Duration: Follow patient 3 times per week to address above goals. Rehabilitation Potential For Stated Goals: Excellent     REHAB RECOMMENDATIONS (at time of discharge pending progress):    Placement: It is my opinion, based on this patient's performance to date, that Mr. Rui Courtney may benefit from intensive therapy at an [address] [place] [address] after discharge due to potential to make ongoing and sustainable functional improvement that is of practical value. .  Equipment:    TBD OCCUPATIONAL PROFILE AND HISTORY:   History of Present Injury/Illness (Reason for Referral):  See H&P  Past Medical History/Comorbidities:   Mr. Krys Grimaldo  has a past medical history of Acute ischemic stroke ([address] (12/12/2019), Acute pancreatitis (11/19/2014), Cerebrovascular accident (CVA) due to embolism ([address] (12/12/2019), Diabetes ([address] (2002), Diabetes ([address] Diabetes mellitus, and Hypertension. He also has no past medical history of Arthritis, Asthma, Autoimmune disease ([address] CAD (coronary artery disease), Cancer ([address] Chronic kidney disease, COPD, Dementia, Dementia ([address] Heart failure ([address] Ill-defined condition, Infectious disease, Liver disease, Other ill-defined conditions(799.89), Psychiatric disorder, PUD (peptic ulcer disease), Seizures ([address] or Sleep disorder. Mr. Krys Grimaldo  has a past surgical history that includes hx hernia repair and hx orthopaedic. Social History/Living Environment:   Home Environment: Apartment  # Steps to Enter: 12  Rails to Enter: Yes  [employer] : Bilateral  One/Two Story Residence: One story  Living Alone: No  Support Systems: Spouse/Significant Other/Partner  Patient Expects to be Discharged to[de-identified] Unknown  Current DME Used/Available at Home: None  Tub or Shower Type: Tub/Shower combination  Prior Level of Function/Work/Activity:  Pt lives at home with his wife. Pt is typically independent with ADL/functional mobility. Pt does not drive. Pt was working part-time at [employer]. Personal Factors:          Age:  53 y.o.         Past/Current Experience:  CVA with flaccid L side        Other factors that influence how disability is experienced by the patient:  multiple co-morbidities    Number of Personal Factors/Comorbidities that affect the Plan of Care: Extensive review of physical, cognitive, and psychosocial performance (3+):  HIGH COMPLEXITY   ASSESSMENT OF OCCUPATIONAL PERFORMANCE[de-identified]   Activities of Daily Living:   Basic ADLs (From Assessment) Complex ADLs (From Assessment)   Feeding: Stand-by assistance  Oral Facial Hygiene/Grooming: Moderate assistance  Bathing: Maximum assistance  Upper Body Dressing: Maximum assistance  Lower Body Dressing: Total assistance  Toileting: Total assistance Instrumental ADL  Meal Preparation: Total assistance  Homemaking: Total assistance   Grooming/Bathing/Dressing Activities of Daily Living                             Bed/Mat Mobility  Rolling: Minimum assistance  Supine to Sit: Moderate assistance  Sit to Supine: Moderate assistance  Sit to Stand: Moderate assistance;Assist x2  Stand to Sit: Moderate assistance;Assist x2  Scooting: Moderate assistance     Most Recent Physical Functioning:   Gross Assessment:                  Posture:     Balance:  Sitting: Impaired  Sitting - Static: Fair (occasional)(+)  Sitting - Dynamic: Fair (occasional)  Standing: Impaired  Standing - Static: Poor  Standing - Dynamic : Poor Bed Mobility:  Rolling: Minimum assistance  Supine to Sit: Moderate assistance  Sit to Supine: Moderate assistance  Scooting: Moderate assistance  Wheelchair Mobility:     Transfers:  Sit to Stand: Moderate assistance;Assist x2  Stand to Sit: Moderate assistance;Assist x2  Stand Pivot Transfers: Maximum assistance;Assist x2            Patient Vitals for the past 6 hrs:   BP BP Patient Position SpO2 Pulse   01/15/20 0800 114/83 At rest 92 % 73   01/15/20 1200 115/71 Sitting 93 % 64       Mental Status  Neurologic State: Alert  Orientation Level: Oriented X4  Cognition: Follows commands  Perception: Appears intact  Perseveration: No perseveration noted  Safety/Judgement: Fall prevention                          Physical Skills Involved:  1. Range of Motion  2. Balance  3. Strength  4. Activity Tolerance  5. Fine Motor Control  6. Gross Motor Control Cognitive Skills Affected (resulting in the inability to perform in a timely and safe manner):  1. Perception  2.  Expression Psychosocial Skills Affected:  1. Habits/Routines  2. Self-Awareness   Number of elements that affect the Plan of Care: 5+:  HIGH COMPLEXITY   CLINICAL DECISION MAKIN[address] [place] [address] AM-PAC 6 Clicks   Daily Activity Inpatient Short Form  How much help from another person does the patient currently need. .. Total A Lot A Little None   1. Putting on and taking off regular lower body clothing? [x] 1   [] 2   [] 3   [] 4   2. Bathing (including washing, rinsing, drying)? [] 1   [x] 2   [] 3   [] 4   3. Toileting, which includes using toilet, bedpan or urinal?   [x] 1   [] 2   [] 3   [] 4   4. Putting on and taking off regular upper body clothing? [] 1   [x] 2   [] 3   [] 4   5. Taking care of personal grooming such as brushing teeth? [] 1   [x] 2   [] 3   [] 4   6. Eating meals? [] 1   [] 2   [x] 3   [] 4   © , Trustees of [address] [place] [address], under license to [employer]. All rights reserved      Score:  Initial: 11 Most Recent: X (Date: -- )    Interpretation of Tool:  Represents activities that are increasingly more difficult (i.e. Bed mobility, Transfers, Gait). Medical Necessity:     · Patient demonstrates   · good and excellent  ·  rehab potential due to higher previous functional level. Reason for Services/Other Comments:  · Patient continues to require skilled intervention due to   · Decreased independence with ADL/functional transfers that impacts overall quality of life. · .   Use of outcome tool(s) and clinical judgement create a POC that gives a: MODERATE COMPLEXITY         TREATMENT:   (In addition to Assessment/Re-Assessment sessions the following treatments were rendered)     Pre-treatment Symptoms/Complaints:    Pain: Initial:   Pain Intensity 1: 0  Pain Location 1: Shoulder  Pain Orientation 1: Left  Pain Intervention(s) 1: Exercise, Repositioned  Post Session:  0   Therapeutic Activity: (15 minutes):   Therapeutic activities including Bed transfers, sitting balance, static/dynamic standing,Chair transfers to improve mobility, strength and balance. Required moderate   to promote static and dynamic balance in standing. Therapeutic Exercise: (10 minutes):  Exercises per grid below to improve mobility and strength. Required total assist with exercises. PM- Therapeutic Activity: (15 minutes): Therapeutic activities including Bed transfers, Chair transfers and sit to stand and static/dynamic standing to improve mobility, strength and balance. Required maximal assist to promote dynamic balance in standing. All PROM in LUE Date:  1/8/2020 Date:  1/15/2020 Date:     Activity/Exercise Parameters Parameters Parameters   Shoulder flexion 10 reps 15 reps    Shoulder int/ext rotation 10 reps 15 reps    Shoulder horizontal add/abd 10 reps 15 reps    Elbow flexion/extension 10 reps 15 reps                                                      Date:  12/15 Date:   Date:     Activity/Exercise Parameters Parameters Parameters   Shifting weight side to side and weightbearing into L UE 10 reps      Trunk flexion   5 reps      Trunk extension to upright posture 5 reps      Dynamic reaching to the L 15 reps      Identifying and visually attending to L side 6 reps                      Braces/Orthotics/Lines/Etc:   ·  None  Treatment/Session Assessment:    · Response to Treatment:  Pt demonstrated good participation. · Interdisciplinary Collaboration:   o Certified Occupational Therapy Assistant  o Registered Nurse  o Rehabilitation Attendant  · After treatment position/precautions:   o Up in chair  o Bed alarm/tab alert on  o Bed/Chair-wheels locked  o Call light within reach  o RN notified   · Compliance with Program/Exercises: Will assess as treatment progresses. · Recommendations/Intent for next treatment session: \"Next visit will focus on advancements to more challenging activities and reduction in assistance provided\".   Total Treatment Duration:  OT Patient Time In/Time Out  Time In: 1982(3190)  Time Out: 2375(7140)     Alejandra Yi

## 2020-01-15 NOTE — ROUTINE PROCESS
The documentation for this period is being entered following the guidelines as defined in the [facility] downtime policy by Nasir Omalley.  
 
1-4:30

## 2020-01-15 NOTE — PROGRESS NOTES
Dysphagia(re evaluation 1/7/2020)  LTG: Patient will tolerate least restrictive diet without overt signs or symptoms of airway compromise. STG: Patient will tolerate mechanical soft diet(ground meats/chopped vegetables) and thin liquids by cup without overt signs or symptoms of airway compromise. STG: Patient will demonstrate use of compensatory strategies to improve swallow safety/function with 90% accuracy given minimal cueing  STG: Patient participate in exercises to improve oral motor movement with 80% accuracy given minimal cueing  STG: Patient will participate in modified barium swallow study as clinically indicated. Neurolinguistic Goals:  LTG: Patient will increase neuro-linguistic abilities to increase safety and awareness in functional living environment   STG: Patient will participate in speech/language/cognitive evaluation.  MET 12/17/19  STG: Patient will solve mathematical problems with 80%accuracy given minimal cueing   STG: Patient will name 10 items to concrete category in 1 minute given minimal cueing  STG: Patient will participate in verbal reasoning tasks with 80% accuracy given minimal cueing  STG: Patient will complete mental manipulation tasks with 80% accuracy given minimal cueing  STG: Patient will complete working memory/attention tasks with 80% accuracy given minimal cueing  STG: Patient will recall relevant verbal information with 80% accuracy with minimal assistance  STG: Patient will utilize memory compensatory strategies to improve recall of functional list/message with 90%accuracy given minimal assistance  STG: Patient will participate in ongoing cognitive linguistic evaluation for therapeutic assessment     SPEECH LANGUAGE PATHOLOGY: DYSPHAGIA and SPEECH/LANGUAGE- Daily Note 3    NAME/AGE/GENDER: Shahana Mcdaniel is a 53 y.o. male  DATE: 1/15/2020  PRIMARY DIAGNOSIS: Acute ischemic stroke ([facility] [I63.9]  Cerebrovascular accident (CVA) due to embolism ([facility] [I63.9]      ICD-10: Treatment Diagnosis: R13.12 Dysphagia, Oropharyngeal Phase    INTERDISCIPLINARY COLLABORATION: Registered Nurse and Physician  PRECAUTIONS/ALLERGIES: Patient has no known allergies. SUBJECTIVE   Patient alert and upright in bed for session. Continue to have flat affect. Orientation:   Person  Place  Time  Situation    Pain: Pain Scale 1: Visual  Pain Intensity 1: 0     OBJECTIVE   Swallowing:   Patient completed the following laryngeal and oral strengthening exercises to maximize swallow function:  -laryngeal exercises:  Attempted; however, patient unable to complete exercise accurately despite multiple attempts with verbal/visual cues. Oral motor   Pt complete 2 x 5 with min-mod cues. Dysarthria treatment: Pt completed LSVT like exercises x 6 with min-mod cues. ASSESSMENT   Swallowing: Completed laryngeal strengthening exercises with mod cues. Continue to recommend  mechanical soft diet -ground meats/chopped vegetables and thin liquids by cup only. NO Straws. Medications crushed in puree. Small bites/sips, slow rate of intake, and alternate solids/liquids. Fully upright and alert for all po intake. Speech/Cognitive Function: Patient continues to have moderate dysarthria with minimal carryover of compensatory strategies for improving speech intelligibility in conversation. Speech intelligible in conversation approximately 70% of the time to familiar listener. Will continue to follow for speech and cognitive linguistic treatment.         Tool Used: Dysphagia Outcome and Severity Scale (ROCHELLE)    Score Comments   Normal Diet  [] 7 With no strategies or extra time needed   Functional Swallow  [] 6 May have mild oral or pharyngeal delay   Mild Dysphagia  [] 5 Which may require one diet consistency restricted    Mild-Moderate Dysphagia  [] 4 With 1-2 diet consistencies restricted   Moderate Dysphagia  [] 3 With 2 or more diet consistencies restricted   Moderate-Severe Dysphagia  [] 2 With partial PO strategies (trials with ST only)   Severe Dysphagia  [] 1 With inability to tolerate any PO safely      Score:  Initial:4 Most Recent: 4 (Date 01/15/20 )   Interpretation of Tool: The Dysphagia Outcome and Severity Scale (ROCHELLE) is a simple, easy-to-use, 7-point scale developed to systematically rate the functional severity of dysphagia based on objective assessment and make recommendations for diet level, independence level, and type of nutrition. PLAN    FREQUENCY/DURATION: Continue to follow patient 3 times a week for duration of hospital stay to address above goals. - Recommendations for next treatment session: Next treatment will address dysarthria, dysphagia, cognition     REHABILITATION POTENTIAL FOR STATED GOALS: Good     COMPLIANCE WITH PROGRAM/EXERCISES: Will assess as treatment progresses    CONTINUATION OF SKILLED SERVICES/MEDICAL NECESSITY:   Patient is expected to demonstrate progress in  swallow strength, swallow timeliness, swallow function, diet tolerance and swallow safety in order to  improve swallow safety, work toward diet advancement and decrease aspiration risk.  Patient continues to require skilled intervention due to dysphagia. RECOMMENDATIONS   DIET:    PO:  Mechanical soft with ground meat/chopped vegatables   Liquids:  regular thin    MEDICATIONS: Crushed in puree     ASPIRATION PRECAUTIONS  · Slow rate of intake  · Small bites/sips  · Upright at 90 degrees during meal     COMPENSATORY STRATEGIES/MODIFICATIONS  · Alternate liquids/solids  · Small sips and bites  · Slow rate     EDUCATION:  · Recommendations discussed with Nursing  · Patient     RECOMMENDATIONS for CONTINUED SPEECH THERAPY: YES: Anticipate need for ongoing speech therapy during this hospitalization and at next level of care.          SAFETY:  After treatment position/precautions:  · Upright in bed  · Call light within reach  · Hospitalist notified    Total Treatment Duration:   Time In: 1028  Time Out: 4402 Jessica Hahn MA/CCC/SLP

## 2020-01-16 LAB
GLUCOSE BLD STRIP.AUTO-MCNC: 103 MG/DL (ref 65–100)
GLUCOSE BLD STRIP.AUTO-MCNC: 111 MG/DL (ref 65–100)
GLUCOSE BLD STRIP.AUTO-MCNC: 84 MG/DL (ref 65–100)
GLUCOSE BLD STRIP.AUTO-MCNC: 87 MG/DL (ref 65–100)

## 2020-01-16 PROCEDURE — 74011250637 HC RX REV CODE- 250/637: Performed by: INTERNAL MEDICINE

## 2020-01-16 PROCEDURE — 74011250636 HC RX REV CODE- 250/636: Performed by: INTERNAL MEDICINE

## 2020-01-16 PROCEDURE — 65270000029 HC RM PRIVATE

## 2020-01-16 PROCEDURE — 82962 GLUCOSE BLOOD TEST: CPT

## 2020-01-16 PROCEDURE — 74011250637 HC RX REV CODE- 250/637: Performed by: HOSPITALIST

## 2020-01-16 PROCEDURE — 92507 TX SP LANG VOICE COMM INDIV: CPT

## 2020-01-16 RX ORDER — METFORMIN HYDROCHLORIDE 500 MG/1
500 TABLET ORAL 2 TIMES DAILY WITH MEALS
Status: DISCONTINUED | OUTPATIENT
Start: 2020-01-16 | End: 2020-02-25

## 2020-01-16 RX ORDER — DIPHENHYDRAMINE HCL 25 MG
25 CAPSULE ORAL ONCE
Status: COMPLETED | OUTPATIENT
Start: 2020-01-16 | End: 2020-01-16

## 2020-01-16 RX ADMIN — GUAIFENESIN 100 MG: 100 SOLUTION ORAL at 13:15

## 2020-01-16 RX ADMIN — ATORVASTATIN CALCIUM 80 MG: 80 TABLET, FILM COATED ORAL at 21:43

## 2020-01-16 RX ADMIN — METFORMIN HYDROCHLORIDE 500 MG: 500 TABLET, FILM COATED ORAL at 07:47

## 2020-01-16 RX ADMIN — LISINOPRIL 40 MG: 20 TABLET ORAL at 07:48

## 2020-01-16 RX ADMIN — ASPIRIN 81 MG 81 MG: 81 TABLET ORAL at 07:48

## 2020-01-16 RX ADMIN — DIPHENHYDRAMINE HYDROCHLORIDE 25 MG: 25 CAPSULE ORAL at 21:43

## 2020-01-16 RX ADMIN — METFORMIN HYDROCHLORIDE 500 MG: 500 TABLET, FILM COATED ORAL at 16:50

## 2020-01-16 RX ADMIN — ENOXAPARIN SODIUM 40 MG: 40 INJECTION SUBCUTANEOUS at 13:15

## 2020-01-16 RX ADMIN — ACETAMINOPHEN 650 MG: 325 TABLET, FILM COATED ORAL at 13:15

## 2020-01-16 RX ADMIN — METOPROLOL SUCCINATE 25 MG: 25 TABLET, FILM COATED, EXTENDED RELEASE ORAL at 07:48

## 2020-01-16 NOTE — PROGRESS NOTES
Dysphagia(re evaluation 1/7/2020)  LTG: Patient will tolerate least restrictive diet without overt signs or symptoms of airway compromise. STG: Patient will tolerate mechanical soft diet(ground meats/chopped vegetables) and thin liquids by cup without overt signs or symptoms of airway compromise. STG: Patient will demonstrate use of compensatory strategies to improve swallow safety/function with 90% accuracy given minimal cueing  STG: Patient participate in exercises to improve oral motor movement with 80% accuracy given minimal cueing  STG: Patient will participate in modified barium swallow study as clinically indicated. Neurolinguistic Goals:  LTG: Patient will increase neuro-linguistic abilities to increase safety and awareness in functional living environment   STG: Patient will participate in speech/language/cognitive evaluation.  MET 12/17/19  STG: Patient will solve mathematical problems with 80%accuracy given minimal cueing   STG: Patient will name 10 items to concrete category in 1 minute given minimal cueing  STG: Patient will participate in verbal reasoning tasks with 80% accuracy given minimal cueing  STG: Patient will complete mental manipulation tasks with 80% accuracy given minimal cueing  STG: Patient will complete working memory/attention tasks with 80% accuracy given minimal cueing  STG: Patient will recall relevant verbal information with 80% accuracy with minimal assistance  STG: Patient will utilize memory compensatory strategies to improve recall of functional list/message with 90%accuracy given minimal assistance  STG: Patient will participate in ongoing cognitive linguistic evaluation for therapeutic assessment     SPEECH LANGUAGE PATHOLOGY: DYSPHAGIA and SPEECH/LANGUAGE- Daily Note 4    NAME/AGE/GENDER: Ryne Loera is a 53 y.o. male  DATE: 1/16/2020  PRIMARY DIAGNOSIS: Acute ischemic stroke ([facility] [I63.9]  Cerebrovascular accident (CVA) due to embolism ([facility] [I63.9]      ICD-10: Treatment Diagnosis: R13.12 Dysphagia, Oropharyngeal Phase    INTERDISCIPLINARY COLLABORATION: Registered Nurse  PRECAUTIONS/ALLERGIES: Patient has no known allergies. SUBJECTIVE   Alert. Straws in cups on bedside table. Reviewed recommendations with patient. Orientation:   Person  Place  Time  Situation    Pain: Pain Scale 1: Numeric (0 - 10)  Pain Intensity 1: 0     OBJECTIVE   Swallowing:   Not addressed today formally. Dysarthria/cognitive linguistic treatment:   Memory (3 sentences):1/4; with repetition 3/4  Recall of dysarthria strategies: 3/3  Dysarthria: sentence repetition 10/10 with min cues         ASSESSMENT   Swallowing: Not formally addressed today. Reviewed results of modified barium swallow study, recommendations, and compensatory strategies. Continue current diet: mechanical soft diet/thin liquids by cup only. No straws. Speech/Cognitive Function: patient with impaired short term memory and ongoing dysarthria. Nasality impaired-hyponasal.   Discussed compensatory strategies to improve recall and speech intelligibility. Patient with improved use of dysarthria strategies during structured tasks and noted to self correct to improve intelligibility during conversation. Recommend ongoing skilled intervention to improve swallow/function strength and cognitive linguistic abilities.           Tool Used: Dysphagia Outcome and Severity Scale (ROCHELLE)    Score Comments   Normal Diet  [] 7 With no strategies or extra time needed   Functional Swallow  [] 6 May have mild oral or pharyngeal delay   Mild Dysphagia  [] 5 Which may require one diet consistency restricted    Mild-Moderate Dysphagia  [] 4 With 1-2 diet consistencies restricted   Moderate Dysphagia  [] 3 With 2 or more diet consistencies restricted   Moderate-Severe Dysphagia  [] 2 With partial PO strategies (trials with ST only)   Severe Dysphagia  [] 1 With inability to tolerate any PO safely      Score:  Initial:4 Most Recent: 4 (Date 01/16/20 )   Interpretation of Tool: The Dysphagia Outcome and Severity Scale (ROCHELLE) is a simple, easy-to-use, 7-point scale developed to systematically rate the functional severity of dysphagia based on objective assessment and make recommendations for diet level, independence level, and type of nutrition. PLAN    FREQUENCY/DURATION: Continue to follow patient 3 times a week for duration of hospital stay to address above goals. - Recommendations for next treatment session: Next treatment will address dysarthria, dysphagia, cognition     REHABILITATION POTENTIAL FOR STATED GOALS: Good     COMPLIANCE WITH PROGRAM/EXERCISES: Will assess as treatment progresses    CONTINUATION OF SKILLED SERVICES/MEDICAL NECESSITY:   Patient is expected to demonstrate progress in  swallow strength, swallow timeliness, swallow function, diet tolerance and swallow safety in order to  improve swallow safety, work toward diet advancement and decrease aspiration risk.  Patient continues to require skilled intervention due to dysphagia. RECOMMENDATIONS   DIET:    PO:  Mechanical soft with ground meat/chopped vegatables   Liquids:  regular thin by cup only    MEDICATIONS: Crushed in puree     ASPIRATION PRECAUTIONS  · Slow rate of intake  · Small bites/sips  · Upright at 90 degrees during meal     COMPENSATORY STRATEGIES/MODIFICATIONS  · Alternate liquids/solids  · Small sips and bites  · Slow rate  · NO STRAWS   EDUCATION:  · Recommendations discussed with Nursing  · Patient     RECOMMENDATIONS for CONTINUED SPEECH THERAPY: YES: Anticipate need for ongoing speech therapy during this hospitalization and at next level of care.          SAFETY:  After treatment position/precautions:  · Upright in bed  · RN notified  · Call light within reach    Total Treatment Duration:   Time In: 5758  Time Out: 317 [facility], [facility] [address] [address] [facility]

## 2020-01-16 NOTE — PROGRESS NOTES
01/16/20 0640   NIH Stroke Scale   Interval Other (comment)   LOC 0   LOC Questions 0   LOC Commands 0   Best Gaze 0   Visual 0   Facial Palsy 1   Motor Right Arm 0   Motor Left Arm 4   Motor Right Leg 0   Motor Left Leg 4   Limb Ataxia 0   Sensory 0   Best Language 1   Dysarthria 1   Extinction and Inattention 0   Total 11   Dual [facility] w/ Redge Lever

## 2020-01-16 NOTE — PROGRESS NOTES
01/15/20 1900   NIH Stroke Scale   Interval Other (comment)   LOC 0   LOC Questions 0   LOC Commands 0   Best Gaze 0   Visual 0   Facial Palsy 1   Motor Right Arm 0   Motor Left Arm 4   Motor Right Leg 0   Motor Left Leg 4   Limb Ataxia 0   Sensory 0   Best Language 1   Dysarthria 1   Extinction and Inattention 0   Total 11   Dual [facility] with JERRY Christina

## 2020-01-16 NOTE — PROGRESS NOTES
Hospitalist Progress Note     Admit Date:  2019  9:07 AM   Name:  Priti Kelly   Age:  53 y.o.  :  1975   MRN:  [id number]   PCP:  Zheng Urban MD      Subjective:     52 y/o smoker with DM, HTN, who presented to ER  with L leg and arm weakness, L facial droop, dysarthria. First noted L leg weakness late night  when he woke up to go to the bathroom. He was normal when he went to bed around 1030. EMS called. Noted to have slurred speech and L facial droop as well. Code S called in ER around 9am.  MRI with acute infarcts in L cerebellar hemisphere, deep frontoparietal white matter, and R paramedian yariel. Also noted old lacunar infarcts. No large vessel occlusion on CTA, but some stenosis noted. He was started on asa, statins. Neurology consulted. An ECHO showed PFO. Plan for cardiology referral and evaluate for closure as outpatient. PT/OT suggested inpatient rehabilitation. The patient is uninsured and the only option at this moment is [facility]. Today, L weakness unchanged, No ac events, no significant resp spx, tolerates po fine, no difficulty swallowing    Objective:     Patient Vitals for the past 24 hrs:   Temp Pulse Resp BP SpO2   20 0800 97.9 °F (36.6 °C) 65 18 118/81 98 %   20 0747  67  118/85    20 0400 98 °F (36.7 °C) 76 18 128/88 98 %   01/15/20 2000 97.4 °F (36.3 °C) 60 18 113/75 97 %   01/15/20 1600 98.4 °F (36.9 °C) 72 18 106/68 94 %   01/15/20 1200 97.7 °F (36.5 °C) 64 17 115/71 93 %     Oxygen Therapy  O2 Sat (%): 98 % (20 0800)  Pulse via Oximetry: 56 beats per minute (20)  O2 Device: Room air (20)  No intake or output data in the 24 hours ending 20 1112      REVIEW OF SYSTEMS: Comprehensive ROS performed and negative except as stated in HPI. Physical Examination:  General:          Well nourished. Alert and oriented. Mild distress  CV:                  RRR. No murmur, rub, or gallop.     Lungs: Clear to auscultation bilaterally. No wheezing, rhonchi, or rales  Extremities:     Warm and dry. No cyanosis or edema. Neurologic:      CN II-XII intact except for mild L facial droop. Sensation intact. PERRLA.  dysarthria. No                          confusion. STR o/5 LUE and LLE. Dysarthria noted. Skin:                No rashes or jaundice. No wounds. Psych:             Appears depressed    Data Review:  I have reviewed all labs, meds, telemetry events, and studies from the last 24 hours.     Recent Results (from the past 24 hour(s))   GLUCOSE, POC    Collection Time: 01/15/20  2:51 PM   Result Value Ref Range    Glucose (POC) 112 (H) 65 - 100 mg/dL   GLUCOSE, POC    Collection Time: 01/15/20  9:30 PM   Result Value Ref Range    Glucose (POC) 81 65 - 100 mg/dL   GLUCOSE, POC    Collection Time: 01/16/20  7:13 AM   Result Value Ref Range    Glucose (POC) 84 65 - 100 mg/dL   GLUCOSE, POC    Collection Time: 01/16/20 10:55 AM   Result Value Ref Range    Glucose (POC) 111 (H) 65 - 100 mg/dL        All Micro Results     None          Current Meds:  Current Facility-Administered Medications   Medication Dose Route Frequency    metFORMIN (GLUCOPHAGE) tablet 500 mg  500 mg Oral BID WITH MEALS    insulin lispro (HUMALOG) injection   SubCUTAneous TIDAC    metoprolol succinate (TOPROL-XL) XL tablet 25 mg  25 mg Oral DAILY    polyethylene glycol (MIRALAX) packet 17 g  17 g Oral DAILY PRN    guaiFENesin (ROBITUSSIN) 100 mg/5 mL oral liquid 100 mg  100 mg Oral Q4H PRN    hydrALAZINE (APRESOLINE) 20 mg/mL injection 20 mg  20 mg IntraVENous Q4H PRN    atorvastatin (LIPITOR) tablet 80 mg  80 mg Oral QHS    lisinopril (PRINIVIL, ZESTRIL) tablet 40 mg  40 mg Oral DAILY    sodium chloride (NS) flush 5-40 mL  5-40 mL IntraVENous PRN    ondansetron (ZOFRAN) injection 4 mg  4 mg IntraVENous Q4H PRN    aspirin chewable tablet 81 mg  81 mg Oral DAILY    acetaminophen (TYLENOL) tablet 650 mg  650 mg Oral Q4H PRN  enoxaparin (LOVENOX) injection 40 mg  40 mg SubCUTAneous Q24H    dextrose 40% (GLUTOSE) oral gel 1 Tube  15 g Oral PRN    glucagon (GLUCAGEN) injection 1 mg  1 mg IntraMUSCular PRN    dextrose (D50W) injection syrg 12.5-25 g  25-50 mL IntraVENous PRN       Diet:  DIET NUTRITIONAL SUPPLEMENTS  DIET DIABETIC CONSISTENT CARB    Other Studies (last 24 hours):  No results found. Assessment and Plan:     Hospital Problems as of 1/16/2020 Date Reviewed: 3/3/2017          Codes Class Noted - Resolved POA    PFO (patent foramen ovale) ICD-10-CM: Q21.1  ICD-9-CM: 745.5  12/17/2019 - Present Yes        Arrhythmia ICD-10-CM: I49.9  ICD-9-CM: 427.9  12/15/2019 - Present Yes        Hyperlipemia ICD-10-CM: E78.5  ICD-9-CM: 272.4  12/15/2019 - Present Yes        * (Principal) Acute embolic stroke [facility]) [id number]-55-SR: I63.9  ICD-9-CM: 434.11  12/12/2019 - Present Yes        Hypertension (Chronic) ICD-10-CM: I10  ICD-9-CM: 401.9  Unknown - Present Yes        Type 2 diabetes mellitus (HCC) (Chronic) ICD-10-CM: E11.9  ICD-9-CM: 250.00  Unknown - Present Yes              A/P:    Acute embolic stroke  Continues to have left sided neglect  Evaluated by cardiology and neurology  -MRI head: with acute infarcts in L cerebellar hemisphere, deep frontoparietal white matter, and R paramedian yariel. Also noted old lacunar infarcts.    -CTA head: No large vessel occlusion   -ISMAEL: 3 mm PFO     Plan:  -Cont statin, ASA  -PT/OT/ST  -Needs outpatient cardiology follow-up for event monitor and PFO closure.      Active Problems:     Hypertension  BP well controlled at this time  Episodes of bradycardia noted     Plan:  -Continue metoprolol; will adjust dose if repeat episodes of bradycardia  -Continue amlodipine     Type 2 diabetes mellitus - cont on Lantus and SSI     Arrhythmia - had brief wide complex tachycardia early in admission, will need event monitor as outpt per cards     Hyperlipemia - statin therapy     PFO (patent foramen ovale) - will need closure as outpatient per cardiology     Dysphagia: diet per ST    Dispo; AWAITING  medicaid approval and placement

## 2020-01-17 LAB
GLUCOSE BLD STRIP.AUTO-MCNC: 111 MG/DL (ref 65–100)
GLUCOSE BLD STRIP.AUTO-MCNC: 82 MG/DL (ref 65–100)
GLUCOSE BLD STRIP.AUTO-MCNC: 83 MG/DL (ref 65–100)
GLUCOSE BLD STRIP.AUTO-MCNC: 83 MG/DL (ref 65–100)

## 2020-01-17 PROCEDURE — 97164 PT RE-EVAL EST PLAN CARE: CPT

## 2020-01-17 PROCEDURE — 82962 GLUCOSE BLOOD TEST: CPT

## 2020-01-17 PROCEDURE — 65270000029 HC RM PRIVATE

## 2020-01-17 PROCEDURE — 74011250636 HC RX REV CODE- 250/636: Performed by: INTERNAL MEDICINE

## 2020-01-17 PROCEDURE — 74011250637 HC RX REV CODE- 250/637: Performed by: HOSPITALIST

## 2020-01-17 PROCEDURE — 74011250637 HC RX REV CODE- 250/637: Performed by: INTERNAL MEDICINE

## 2020-01-17 PROCEDURE — 97112 NEUROMUSCULAR REEDUCATION: CPT

## 2020-01-17 RX ADMIN — ASPIRIN 81 MG 81 MG: 81 TABLET ORAL at 08:53

## 2020-01-17 RX ADMIN — METFORMIN HYDROCHLORIDE 500 MG: 500 TABLET, FILM COATED ORAL at 08:53

## 2020-01-17 RX ADMIN — METOPROLOL SUCCINATE 25 MG: 25 TABLET, FILM COATED, EXTENDED RELEASE ORAL at 08:57

## 2020-01-17 RX ADMIN — ACETAMINOPHEN 650 MG: 325 TABLET, FILM COATED ORAL at 18:19

## 2020-01-17 RX ADMIN — GUAIFENESIN 100 MG: 100 SOLUTION ORAL at 08:52

## 2020-01-17 RX ADMIN — ENOXAPARIN SODIUM 40 MG: 40 INJECTION SUBCUTANEOUS at 14:54

## 2020-01-17 RX ADMIN — LISINOPRIL 40 MG: 20 TABLET ORAL at 08:53

## 2020-01-17 RX ADMIN — ACETAMINOPHEN 650 MG: 325 TABLET, FILM COATED ORAL at 08:52

## 2020-01-17 RX ADMIN — ATORVASTATIN CALCIUM 80 MG: 80 TABLET, FILM COATED ORAL at 22:27

## 2020-01-17 RX ADMIN — METFORMIN HYDROCHLORIDE 500 MG: 500 TABLET, FILM COATED ORAL at 18:14

## 2020-01-17 RX ADMIN — GUAIFENESIN 100 MG: 100 SOLUTION ORAL at 18:19

## 2020-01-17 NOTE — INTERDISCIPLINARY ROUNDS
Interdisciplinary team rounds were held 1/17/2020 with the following team members:  Care Management, Physical Therapy and . Plan of care discussed. See clinical pathway and/or care plan for interventions and desired outcomes.

## 2020-01-17 NOTE — PROGRESS NOTES
01/16/20 1900   NIH Stroke Scale   Interval Other (comment)  (Dual [facility] with Drew Camacho RN)   LOC 0   LOC Questions 0   LOC Commands 0   Best Gaze 0   Visual 0   Facial Palsy 1   Motor Right Arm 0   Motor Left Arm 4   Motor Right Leg 0   Motor Left Leg 4   Limb Ataxia 0   Sensory 0   Best Language 1   Dysarthria 1   Extinction and Inattention 0   Total 11

## 2020-01-17 NOTE — PROGRESS NOTES
Problem: Mobility Impaired (Adult and Pediatric)  Goal: *Acute Goals and Plan of Care  Description  Acute PT Goals (reviewed & updated 1/17/2020):  STG:  (1.) Mr. Annie Sykes will move from supine to sit and sit to supine , scoot up and down and roll side to side with CONTACT GUARD ASSIST within 3 treatment day(s). (2.) Mr. Annie Sykes will transfer from bed to chair and chair to bed with MODERATE ASSIST x1 using the least restrictive device within 3 treatment day(s). (3.) Mr. Annie Sykes will perform standing static and dynamic balance activities x 10 minutes with MODERATE ASSIST x1 to improve safety within 3 treatment day(s). LTG:  (1.) Mr. Annie Sykes will move from supine to sit and sit to supine , scoot up and down and roll side to side with STAND BY ASSIST within 7 treatment day(s). (2.) Mr. Annie Sykes will transfer from bed to chair and chair to bed with MINIMAL ASSIST using the least restrictive device within 7 treatment day(s). (3.) Mr. Annie Sykes will ambulate with MODERATE ASSIST for 20+ feet with the least restrictive device within 7 treatment day(s). (4.) Mr. Annie Sykes will perform standing static and dynamic balance activities x 20 minutes with MINIMAL ASSIST to improve safety within 7 treatment day(s). (5.) Mr. Annie Sykes will perform bilateral lower extremity exercises x 15 min for HEP with SUPERVISION to improve strength, endurance, and functional mobility within 7 treatment day(s).      PHYSICAL THERAPY: Daily Note, Re-evaluation and AM 1/17/2020  INPATIENT: PT Visit Days : 1  Payor: MEDICAID PENDING / Plan: [employer] PENDING / Product Type: Medicaid /       NAME/AGE/GENDER: Jade Monge is a 53 y.o. male   PRIMARY DIAGNOSIS: Acute ischemic stroke ([address] [I63.9]  Cerebrovascular accident (CVA) due to embolism ([address] [L30.9] Acute embolic stroke ([address] Acute embolic stroke ([address]       ICD-10: Treatment Diagnosis:   · Other lack of cordination (R27.8)  · Difficulty in walking, Not elsewhere classified (R26.2)  · Other abnormalities of gait and mobility (R26.89)  · Unspecified Lack of Coordination (R27.9)  · Hemiplegia and hemiparesis following cerebral infarction affecting   · left non-dominant side (R26.061)   Precaution/Allergies:  Patient has no known allergies. ASSESSMENT:     Mr. Elvin Farooq is a 53year old admitted with acute CVA with left hemiparesis and left inattention. 1/17/2020: Patient is supine upon contact and agreeable to PT treatment. Re-evaluation indicated at this time due to 7th visit. Plan of care and goals have been reviewed and updated to reflect pt's current level of function and mobility. He reports no pain at rest. Supine to sit with min A and verbal & tactile cues cues. He demonstrated good static sitting balance at the edge of the bed during approximation of the LUE. Min A to scoot towards edge of bed for safe, comfortable positioning. Sit <> stand with luke walker RUE, initial Mod A x2 for push to stand, but then once in standing only Min A x2 to maintain. Side steps at egge of bed Mod A x2, assist required for progression of  LLE, and visual dn verbal cues for sequencing of luke walker and RLE to promote safety and dynamic standing balance control. The patient sat with min Ax2 and took a seated rest break. Sit <> stand again with same instruction, then pivoted to bedside chair. Poor coordination of LLE, especially pivoting toward that side resulting in decreased balance control. Mod A x2 to get to bedside chair. The patient was then positioned for comfort with needs in reach, alarm activated for safety. Overall the patient is making slow progress toward therapy goals, continues to be very motivated and works hard throughout treatment. Will continue skilled PT intervention to promote highest level of function, mobility, and independence as patient is still below functional baseline.      This section established at most recent assessment   PROBLEM LIST (Impairments causing functional limitations):  1. Decreased Strength  2. Decreased ADL/Functional Activities  3. Decreased Transfer Abilities  4. Decreased Ambulation Ability/Technique  5. Decreased Balance  6. Increased Pain  7. Decreased Activity Tolerance  8. Increased Fatigue  9. Decreased Flexibility/Joint Mobility   INTERVENTIONS PLANNED: (Benefits and precautions of physical therapy have been discussed with the patient.)  1. Balance Exercise  2. Bed Mobility  3. Family Education  4. Gait Training  5. Home Exercise Program (HEP)  6. Manual Therapy  7. Neuromuscular Re-education/Strengthening  8. Range of Motion (ROM)  9. Therapeutic Activites  10. Therapeutic Exercise/Strengthening  11. Transfer Training     TREATMENT PLAN: Frequency/Duration: 3 times a week for duration of hospital stay  Rehabilitation Potential For Stated Goals: Good     REHAB RECOMMENDATIONS (at time of discharge pending progress):    Placement: It is my opinion, based on this patient's performance to date, that Mr. Elvin Farooq may benefit from intensive therapy at an [address] [place] [address] after discharge due to a probable need for 24 hour rehab nursing, a probable need for multiple therapy disciplines, and potential to make ongoing and sustainable functional improvement that is of practical value. .  Equipment:    TBD pending progress with therapy. HISTORY:   History of Present Injury/Illness (Reason for Referral):  Per H&P: \"Pt is a 52 y/o smoker with DM, HTN, who presented to ER with L leg and arm weakness, L facial droop, dysarthria. First noted L leg weakness late night 12/11 when he woke up to go to the bathroom. He was normal when he went to bed around 1030. Woke up this morning and had persistent weakness L leg and also now noted in L arm. EMS called. Noted to have slurred speech and L facial droop as well.   Code S called in ER around 9am.  MRI with acute infarcts in L cerebellar hemisphere, deep frontoparietal white matter, and R paramedian yariel. Also noted old lacunar infarcts. No large vessel occlusion on CTA, but some stenosis noted. No hx afib, TIA, CVA. No CP, palpitations, SOB. \"  Past Medical History/Comorbidities:   Mr. Elvin Farooq  has a past medical history of Acute ischemic stroke ([address] (12/12/2019), Acute pancreatitis (11/19/2014), Cerebrovascular accident (CVA) due to embolism ([address] (12/12/2019), Diabetes ([address] (2002), Diabetes ([address] Diabetes mellitus, and Hypertension. He also has no past medical history of Arthritis, Asthma, Autoimmune disease ([address] CAD (coronary artery disease), Cancer ([address] Chronic kidney disease, COPD, Dementia, Dementia ([address] Heart failure ([address] Ill-defined condition, Infectious disease, Liver disease, Other ill-defined conditions(799.89), Psychiatric disorder, PUD (peptic ulcer disease), Seizures ([address] or Sleep disorder. Mr. Elvin Farooq  has a past surgical history that includes hx hernia repair and hx orthopaedic. Social History/Living Environment:   Home Environment: Apartment  # Steps to Enter: 12  Rails to Enter: Yes  [employer] : Bilateral  One/Two Story Residence: One story  Living Alone: No  Support Systems: Spouse/Significant Other/Partner  Patient Expects to be Discharged to[de-identified] Unknown  Current DME Used/Available at Home: None  Tub or Shower Type: Tub/Shower combination  Prior Level of Function/Work/Activity:  Independent, lives with wife in 2nd story 1 level apartment. No recent falls.    Number of Personal Factors/Comorbidities that affect the Plan of Care: 1-2: MODERATE COMPLEXITY   EXAMINATION:   Most Recent Physical Functioning:   Gross Assessment:  AROM: Generally decreased, functional(decreased/absent LLE and LUE)  Strength: Generally decreased, functional(4/5 RLE, 0 to 1/5 LLE)  Tone: Abnormal  Sensation: Intact               Posture:     Balance:  Sitting: Impaired  Sitting - Static: Fair (occasional)  Sitting - Dynamic: Fair (occasional)  Standing: Impaired  Standing - Static: Fair;Constant support  Standing - Dynamic : Poor;Constant support Bed Mobility:  Supine to Sit: Minimum assistance  Scooting: Minimum assistance; Additional time  Interventions: Safety awareness training; Tactile cues; Verbal cues  Wheelchair Mobility:     Transfers:  Sit to Stand: Moderate assistance;Assist x2  Stand to Sit: Minimum assistance;Assist x2  Bed to Chair: Moderate assistance;Assist x2  Interventions: Safety awareness training; Tactile cues; Verbal cues  Gait:     Base of Support: Center of gravity altered;Narrowed; Shift to right  Speed/Clotilde: Shuffled;Slow;Delayed  Step Length: Right shortened;Left shortened  Gait Abnormalities: Decreased step clearance; Hemiplegic;Trunk sway increased  Distance (ft): 5 Feet (ft)(side steps edge of bed)  Assistive Device: Walker luke;Gait belt  Ambulation - Level of Assistance: Moderate assistance;Assist x2      Body Structures Involved:  1. Nerves  2. Voice/Speech  3. Bones  4. Joints  5. Muscles Body Functions Affected:  1. Mental  2. Sensory/Pain  3. Neuromusculoskeletal  4. Movement Related Activities and Participation Affected:  1. General Tasks and Demands  2. Mobility  3. Self Care  4. Interpersonal Interactions and Relationships   Number of elements that affect the Plan of Care: 4+: HIGH COMPLEXITY   CLINICAL PRESENTATION:   Presentation: Evolving clinical presentation with changing clinical characteristics: MODERATE COMPLEXITY   CLINICAL DECISION MAKIN [facility] Mobility Inpatient Short Form  How much difficulty does the patient currently have. .. Unable A Lot A Little None   1. Turning over in bed (including adjusting bedclothes, sheets and blankets)? [] 1   [] 2   [x] 3   [] 4   2. Sitting down on and standing up from a chair with arms ( e.g., wheelchair, bedside commode, etc.)   [] 1   [x] 2   [] 3   [] 4   3. Moving from lying on back to sitting on the side of the bed?    [] 1   [] 2   [x] 3   [] 4   How much help from another person does the patient currently need. .. Total A Lot A Little None   4. Moving to and from a bed to a chair (including a wheelchair)? [] 1   [x] 2   [] 3   [] 4   5. Need to walk in hospital room? [] 1   [x] 2   [] 3   [] 4   6. Climbing 3-5 steps with a railing? [] 1   [x] 2   [] 3   [] 4   © 2007, Trustees of [address] [place] [address] [address] [id number], under license to [employer]. All rights reserved      Score:  Initial: 13 Most Recent: 14 (Date:1/17/2020)    Interpretation of Tool:  Represents activities that are increasingly more difficult (i.e. Bed mobility, Transfers, Gait). Medical Necessity:     · Patient is expected to demonstrate progress in   · strength, range of motion, balance, coordination, and functional technique  ·  to   · increase independence with all mobility. · .  Reason for Services/Other Comments:  · Patient continues to require skilled intervention due to   · medical complications and mobility deficits which impact his level of function, safety, and independence as indicated above.    · .   Use of outcome tool(s) and clinical judgement create a POC that gives a: Questionable prediction of patient's progress: MODERATE COMPLEXITY        TREATMENT:   (In addition to Assessment/Re-Assessment sessions the following treatments were rendered)   Pre-treatment Symptoms/Complaints:  None, agreeable to therapy  Pain: Initial:   Pain Intensity 1: 0  Pain Location 1: Leg  Pain Orientation 1: Left  Post Session:  None, resting in chair     PT Reassessment Table:   Initial Physical Functioning: Most Recent Physical Functioning:   Gross Assessment:  AROM: Generally decreased, functional(absent LLE and LUE)  PROM: Generally decreased, functional(L UE)  Strength: Generally decreased, functional(4/5 RLE, 0 to 1/5 LLE)  Coordination: (non-functional in the L UE)  Tone: Abnormal(hypotonic in L UE)  Sensation: Intact Gross Assessment:   AROM: Generally decreased, functional(decreased/absent LLE and LUE)  Strength: Generally decreased, functional(4/5 RLE, 0 to 1/5 LLE)  Tone: Abnormal  Sensation: Intact   Balance:   Sitting: Impaired  Sitting - Static: Fair (occasional)  Sitting - Dynamic: Poor (constant support)  Standing: Impaired  Standing - Static: Poor  Standing - Dynamic : Poor Balance:   Sitting: Impaired  Sitting - Static: Fair (occasional)  Sitting - Dynamic: Fair (occasional)  Standing: Impaired  Standing - Static: Fair;Constant support  Standing - Dynamic : Poor;Constant support   Bed Mobility:   Rolling: Moderate assistance  Supine to Sit: Moderate assistance  Sit to Supine: Moderate assistance  Scooting: Moderate assistance  Interventions: Safety awareness training, Tactile cues, Verbal cues Bed Mobility:   Supine to Sit: Minimum assistance  Scooting: Minimum assistance; Additional time  Interventions: Safety awareness training; Tactile cues; Verbal cues   Transfers:   Sit to Stand: Moderate assistance, Assist x2  Stand to Sit: Moderate assistance, Assist x2  Stand Pivot Transfers: Moderate assistance, Maximum assistance, Assist x2  Bed to Chair: Moderate assistance, Assist x2  Interventions: Safety awareness training, Tactile cues, Verbal cues Transfers:   Sit to Stand: Moderate assistance;Assist x2  Stand to Sit: Minimum assistance;Assist x2  Bed to Chair: Moderate assistance;Assist x2  Interventions: Safety awareness training; Tactile cues; Verbal cues   Gait:   Base of Support: Center of gravity altered, Narrowed, Shift to right  Speed/Clotilde: Delayed, Shuffled, Slow  Step Length: Left shortened, Right shortened  Gait Abnormalities: Decreased step clearance, Hemiplegic, Trunk sway increased  Ambulation - Level of Assistance: Moderate assistance, Assist x2  Distance (ft): 5 Feet (ft)(side steps edge of bed)  Assistive Device: Walker, rolling, Gait belt Gait:   Base of Support: Center of gravity altered;Narrowed; Shift to right  Speed/Clotilde: Shuffled;Slow;Delayed  Step Length: Right shortened;Left shortened  Gait Abnormalities: Decreased step clearance; Hemiplegic;Trunk sway increased  Ambulation - Level of Assistance: Moderate assistance;Assist x2  Distance (ft): 5 Feet (ft)(side steps edge of bed)  Assistive Device: Walker luke;Gait belt     Neuromuscular Re-education: ( 10 Minutes): Bed mobility, sitting balance edge of bed, AAROM/PROM BLE, supported standing balance control with hemiwalker LUE, side steps edge of bed with luke walker, and stand pivot with luke walker from edge of bed to bedside chair to improve balance, coordination, kinesthetic sense, posture and proprioception. Required moderate visual, verbal, manual and tactile cues to promote static and dynamic balance in standing, promote coordination of left, upper extremity(s), lower extremity(s) and promote motor control of left, upper extremity(s), lower extremity(s). Braces/Orthotics/Lines/Etc:   · O2 Device: Room Air   Treatment/Session Assessment:    · Response to Treatment:  see above. Works very hard with therapy. · Interdisciplinary Collaboration:   o Physical Therapist  o Registered Nurse  o Rehabilitation Attendant  · After treatment position/precautions:   o Up in chair  o Bed alarm/tab alert on  o Bed/Chair-wheels locked  o Bed in low position  o Call light within reach  o RN notified   · Compliance with Program/Exercises: Compliant all of the time  · Recommendations/Intent for next treatment session: \"Next visit will focus on advancements to more challenging activities and reduction in assistance provided\".     Total Treatment Duration:  PT Patient Time In/Time Out  Time In: 1112  Time Out: 14[facility]

## 2020-01-17 NOTE — PROGRESS NOTES
01/17/20 0655   NIH Stroke Scale   Interval Other (comment)   LOC 0   LOC Questions 0   LOC Commands 0   Best Gaze 0   Visual 0   Facial Palsy 1   Motor Right Arm 0   Motor Left Arm 4   Motor Right Leg 0   Motor Left Leg 4   Limb Ataxia 0   Sensory 0   Best Language 1   Dysarthria 1   Extinction and Inattention 0   Total 11   dual nih with al rn

## 2020-01-17 NOTE — PROGRESS NOTES
Problem: Patient Education: Go to Patient Education Activity  Goal: Patient/Family Education  Outcome: Progressing Towards Goal     Problem: Pressure Injury - Risk of  Goal: *Prevention of pressure injury  Description  Document Dewey Scale and appropriate interventions in the flowsheet. Outcome: Progressing Towards Goal  Note: Pressure Injury Interventions:  Sensory Interventions: Assess changes in LOC, Discuss PT/OT consult with provider, Float heels, Keep linens dry and wrinkle-free, Maintain/enhance activity level, Minimize linen layers, Pressure redistribution bed/mattress (bed type), Turn and reposition approx. every two hours (pillows and wedges if needed)    Moisture Interventions: Absorbent underpads, Check for incontinence Q2 hours and as needed, Limit adult briefs, Minimize layers    Activity Interventions: Increase time out of bed, Pressure redistribution bed/mattress(bed type), PT/OT evaluation    Mobility Interventions: HOB 30 degrees or less, Pressure redistribution bed/mattress (bed type), PT/OT evaluation    Nutrition Interventions: Document food/fluid/supplement intake    Friction and Shear Interventions: Apply protective barrier, creams and emollients, Foam dressings/transparent film/skin sealants, HOB 30 degrees or less, Lift sheet                Problem: Patient Education: Go to Patient Education Activity  Goal: Patient/Family Education  Outcome: Progressing Towards Goal     Problem: Falls - Risk of  Goal: *Absence of Falls  Description  Document Jeanne Fall Risk and appropriate interventions in the flowsheet.   Outcome: Progressing Towards Goal  Note: Fall Risk Interventions:  Mobility Interventions: Communicate number of staff needed for ambulation/transfer, OT consult for ADLs, Patient to call before getting OOB, PT Consult for mobility concerns    Mentation Interventions: Bed/chair exit alarm    Medication Interventions: Patient to call before getting OOB, Teach patient to arise slowly    Elimination Interventions: Call light in reach, Patient to call for help with toileting needs, Stay With Me (per policy), Toilet paper/wipes in reach, Toileting schedule/hourly rounds, Urinal in reach    History of Falls Interventions: Door open when patient unattended, Investigate reason for fall         Problem: Patient Education: Go to Patient Education Activity  Goal: Patient/Family Education  Outcome: Progressing Towards Goal     Problem: Diabetes Self-Management  Goal: *Disease process and treatment process  Description  Define diabetes and identify own type of diabetes; list 3 options for treating diabetes. Outcome: Progressing Towards Goal  Goal: *Incorporating nutritional management into lifestyle  Description  Describe effect of type, amount and timing of food on blood glucose; list 3 methods for planning meals. Outcome: Progressing Towards Goal  Goal: *Incorporating physical activity into lifestyle  Description  State effect of exercise on blood glucose levels. Outcome: Progressing Towards Goal  Goal: *Developing strategies to promote health/change behavior  Description  Define the ABC's of diabetes; identify appropriate screenings, schedule and personal plan for screenings. Outcome: Progressing Towards Goal  Goal: *Using medications safely  Description  State effect of diabetes medications on diabetes; name diabetes medication taking, action and side effects. Outcome: Progressing Towards Goal  Goal: *Monitoring blood glucose, interpreting and using results  Description  Identify recommended blood glucose targets  and personal targets. Outcome: Progressing Towards Goal  Goal: *Prevention, detection, treatment of acute complications  Description  List symptoms of hyper- and hypoglycemia; describe how to treat low blood sugar and actions for lowering  high blood glucose level.   Outcome: Progressing Towards Goal  Goal: *Prevention, detection and treatment of chronic complications  Description  Define the natural course of diabetes and describe the relationship of blood glucose levels to long term complications of diabetes.   Outcome: Progressing Towards Goal  Goal: *Developing strategies to address psychosocial issues  Description  Describe feelings about living with diabetes; identify support needed and support network  Outcome: Progressing Towards Goal     Problem: Patient Education: Go to Patient Education Activity  Goal: Patient/Family Education  Outcome: Progressing Towards Goal     Problem: Patient Education: Go to Patient Education Activity  Goal: Patient/Family Education  Outcome: Progressing Towards Goal     Problem: Nutrition Deficit  Goal: *Optimize nutritional status  Outcome: Progressing Towards Goal     Problem: Patient Education: Go to Patient Education Activity  Goal: Patient/Family Education  Outcome: Progressing Towards Goal     Problem: General Medical Care Plan  Goal: *Vital signs within specified parameters  Outcome: Progressing Towards Goal  Goal: *Labs within defined limits  Outcome: Progressing Towards Goal  Goal: *Absence of infection signs and symptoms  Description  Wash hand more often   Outcome: Progressing Towards Goal  Goal: *Optimal pain control at patient's stated goal  Outcome: Progressing Towards Goal  Goal: *Skin integrity maintained  Outcome: Progressing Towards Goal  Goal: *Fluid volume balance  Outcome: Progressing Towards Goal  Goal: *Optimize nutritional status  Outcome: Progressing Towards Goal  Goal: *Anxiety reduced or absent  Outcome: Progressing Towards Goal  Goal: *Progressive mobility and function (eg: ADL's)  Outcome: Progressing Towards Goal     Problem: Patient Education: Go to Patient Education Activity  Goal: Patient/Family Education  Outcome: Progressing Towards Goal     Problem: Patient Education: Go to Patient Education Activity  Goal: Patient/Family Education  Outcome: Progressing Towards Goal     Problem: Patient Education: Go to Patient Education Activity  Goal: Patient/Family Education  Outcome: Progressing Towards Goal     Problem: Patient Education: Go to Patient Education Activity  Goal: Patient/Family Education  Outcome: Progressing Towards Goal     Problem: Ischemic Stroke: Discharge Outcomes  Goal: *Verbalizes anxiety and depression are reduced or absent  Outcome: Progressing Towards Goal  Goal: *Verbalize understanding of risk factor modification(Stroke Metric)  Outcome: Progressing Towards Goal  Goal: *Hemodynamically stable  Outcome: Progressing Towards Goal  Goal: *Absence of aspiration pneumonia  Outcome: Progressing Towards Goal  Goal: *Aware of needed dietary changes  Outcome: Progressing Towards Goal  Goal: *Verbalize understanding of prescribed medications including anti-coagulants, anti-lipid, and/or anti-platelets(Stroke Metric)  Outcome: Progressing Towards Goal  Goal: *Tolerating diet  Outcome: Progressing Towards Goal  Goal: *Aware of follow-up diagnostics related to anticoagulants  Outcome: Progressing Towards Goal  Goal: *Ability to perform ADLs and demonstrates progressive mobility and function  Outcome: Progressing Towards Goal  Goal: *Absence of DVT(Stroke Metric)  Outcome: Progressing Towards Goal  Goal: *Absence of aspiration  Outcome: Progressing Towards Goal  Goal: *Optimal pain control at patient's stated goal  Outcome: Progressing Towards Goal  Goal: *Home safety concerns addressed  Outcome: Progressing Towards Goal  Goal: *Describes available resources and support systems  Outcome: Progressing Towards Goal  Goal: *Verbalizes understanding of activation of EMS(911) for stroke symptoms(Stroke Metric)  Outcome: Progressing Towards Goal  Goal: *Understands and describes signs and symptoms to report to providers(Stroke Metric)  Outcome: Progressing Towards Goal  Goal: *Neurolgocially stable (absence of additional neurological deficits)  Outcome: Progressing Towards Goal  Goal: *Verbalizes importance of follow-up with primary care physician(Stroke Metric)  Outcome: Progressing Towards Goal  Goal: *Smoking cessation discussed,if applicable(Stroke Metric)  Outcome: Progressing Towards Goal  Goal: *Depression screening completed(Stroke Metric)  Outcome: Progressing Towards Goal     Problem: Pain  Goal: *Control of Pain  Outcome: Progressing Towards Goal     Problem: Patient Education: Go to Patient Education Activity  Goal: Patient/Family Education  Outcome: Progressing Towards Goal

## 2020-01-17 NOTE — PROGRESS NOTES
Problem: Mobility Impaired (Adult and Pediatric)  Goal: *Acute Goals and Plan of Care  Description  Acute PT Goals (reviewed & updated 1/17/2020):  STG:  (1.) Mr. Alondra Altamirano will move from supine to sit and sit to supine , scoot up and down and roll side to side with CONTACT GUARD ASSIST within 3 treatment day(s). (2.) Mr. Alondra Altamirano will transfer from bed to chair and chair to bed with MODERATE ASSIST x1 using the least restrictive device within 3 treatment day(s). (3.) Mr. Alondra Altamirano will perform standing static and dynamic balance activities x 10 minutes with MODERATE ASSIST x1 to improve safety within 3 treatment day(s). LTG:  (1.) Mr. Alondra Altamirano will move from supine to sit and sit to supine , scoot up and down and roll side to side with STAND BY ASSIST within 7 treatment day(s). (2.) Mr. Alondra Altamirano will transfer from bed to chair and chair to bed with MINIMAL ASSIST using the least restrictive device within 7 treatment day(s). (3.) Mr. Alondra Altamirano will ambulate with MODERATE ASSIST for 20+ feet with the least restrictive device within 7 treatment day(s). (4.) Mr. Alondra Altamirano will perform standing static and dynamic balance activities x 20 minutes with MINIMAL ASSIST to improve safety within 7 treatment day(s). (5.) Mr. Alondra Altamirano will perform bilateral lower extremity exercises x 15 min for HEP with SUPERVISION to improve strength, endurance, and functional mobility within 7 treatment day(s).      PHYSICAL THERAPY: Daily Note and PM 1/17/2020  INPATIENT: PT Visit Days : 1  Payor: MEDICAID PENDING / Plan: [place] PENDING / Product Type: Medicaid /       NAME/AGE/GENDER: Chel Wood is a 53 y.o. male   PRIMARY DIAGNOSIS: Acute ischemic stroke ([address] [I63.9]  Cerebrovascular accident (CVA) due to embolism ([address] [E86.7] Acute embolic stroke ([address] Acute embolic stroke ([address]       ICD-10: Treatment Diagnosis:   · Other lack of cordination (R27.8)  · Difficulty in walking, Not elsewhere classified (R26.2)  · Other abnormalities of gait and mobility (R26.89)  · Unspecified Lack of Coordination (R27.9)  · Hemiplegia and hemiparesis following cerebral infarction affecting   · left non-dominant side (L68.234)   Precaution/Allergies:  Patient has no known allergies. ASSESSMENT:     Mr. Teresa Christine is a 53year old admitted with acute CVA with left hemiparesis and left inattention. 1/17/2020 PM: PT returned in afternoon to assist pt back to bed. Pt did well sitting up for longer period of time today. Reports increased discomfort on L side. Noted sling present in room, donned LUE for support during mobility. Sit <> stand Min A x2 with hand held assist. Pt pivoted from chair to bed turning to R side, and did very well, requiring less assist to maintain balance and progress BLE to perform transfer safely. Returned to sit edge of bed. Sit <> stand a few more attempts, pt did well with standing, Min-Mod A x2 but due to fatigue was unable to perform dynamic mobility due to decreased balance control and BLE coordination. Pt returned sit > supine with Min A, positioned with pillows, alarm activated for safety, all needs within reach, RN notified. Overall the patient is making slow progress toward therapy goals, continues to be very motivated and works hard throughout treatment. He did make noted progress today with activity tolerance, transfers, standing balance. Will continue skilled PT intervention to promote highest level of function, mobility, and independence as patient is still below functional baseline. This section established at most recent assessment   PROBLEM LIST (Impairments causing functional limitations):  1. Decreased Strength  2. Decreased ADL/Functional Activities  3. Decreased Transfer Abilities  4. Decreased Ambulation Ability/Technique  5. Decreased Balance  6. Increased Pain  7. Decreased Activity Tolerance  8. Increased Fatigue  9.  Decreased Flexibility/Joint Mobility   INTERVENTIONS PLANNED: (Benefits and precautions of physical therapy have been discussed with the patient.)  1. Balance Exercise  2. Bed Mobility  3. Family Education  4. Gait Training  5. Home Exercise Program (HEP)  6. Manual Therapy  7. Neuromuscular Re-education/Strengthening  8. Range of Motion (ROM)  9. Therapeutic Activites  10. Therapeutic Exercise/Strengthening  11. Transfer Training     TREATMENT PLAN: Frequency/Duration: 3 times a week for duration of hospital stay  Rehabilitation Potential For Stated Goals: Good     REHAB RECOMMENDATIONS (at time of discharge pending progress):    Placement: It is my opinion, based on this patient's performance to date, that Mr. Krys Grimaldo may benefit from intensive therapy at an [address] [place] [address] after discharge due to a probable need for 24 hour rehab nursing, a probable need for multiple therapy disciplines, and potential to make ongoing and sustainable functional improvement that is of practical value. .  Equipment:    TBD pending progress with therapy. HISTORY:   History of Present Injury/Illness (Reason for Referral):  Per H&P: \"Pt is a 54 y/o smoker with DM, HTN, who presented to ER with L leg and arm weakness, L facial droop, dysarthria. First noted L leg weakness late night 12/11 when he woke up to go to the bathroom. He was normal when he went to bed around 1030. Woke up this morning and had persistent weakness L leg and also now noted in L arm. EMS called. Noted to have slurred speech and L facial droop as well. Code S called in ER around 9am.  MRI with acute infarcts in L cerebellar hemisphere, deep frontoparietal white matter, and R paramedian yariel. Also noted old lacunar infarcts. No large vessel occlusion on CTA, but some stenosis noted. No hx afib, TIA, CVA. No CP, palpitations, SOB. \"  Past Medical History/Comorbidities:   Mr. Krys Grimaldo  has a past medical history of Acute ischemic stroke ([facility] (12/12/2019), Acute pancreatitis (11/19/2014), Cerebrovascular accident (CVA) due to embolism ([facility] (12/12/2019), Diabetes ([facility] (2002), Diabetes ([facility] Diabetes mellitus, and Hypertension. He also has no past medical history of Arthritis, Asthma, Autoimmune disease ([facility] CAD (coronary artery disease), Cancer ([facility] Chronic kidney disease, COPD, Dementia, Dementia ([facility] Heart failure ([facility] Ill-defined condition, Infectious disease, Liver disease, Other ill-defined conditions(799.89), Psychiatric disorder, PUD (peptic ulcer disease), Seizures ([facility] or Sleep disorder. Mr. Osmar Davey  has a past surgical history that includes hx hernia repair and hx orthopaedic. Social History/Living Environment:   Home Environment: Apartment  # Steps to Enter: 12  Rails to Enter: Yes  [employer] : Bilateral  One/Two Story Residence: One story  Living Alone: No  Support Systems: Spouse/Significant Other/Partner  Patient Expects to be Discharged to[de-identified] Unknown  Current DME Used/Available at Home: None  Tub or Shower Type: Tub/Shower combination  Prior Level of Function/Work/Activity:  Independent, lives with wife in 2nd story 1 level apartment. No recent falls. Number of Personal Factors/Comorbidities that affect the Plan of Care: 1-2: MODERATE COMPLEXITY   EXAMINATION:   Most Recent Physical Functioning:   Gross Assessment:  AROM: Generally decreased, functional(decreased/absent LLE and LUE)  Strength: Generally decreased, functional(4/5 RLE, 0 to 1/5 LLE)  Tone: Abnormal  Sensation: Intact               Posture:     Balance:  Sitting: Impaired  Sitting - Static: Fair (occasional)  Sitting - Dynamic: Fair (occasional)  Standing: Impaired  Standing - Static: Fair;Constant support  Standing - Dynamic : Poor;Constant support Bed Mobility:  Supine to Sit: Minimum assistance  Sit to Supine: Minimum assistance  Scooting: Minimum assistance  Interventions: Safety awareness training; Tactile cues; Verbal cues  Wheelchair Mobility:     Transfers:  Sit to Stand: Minimum assistance;Assist x2  Stand to Sit: Minimum assistance;Assist x2  Bed to Chair: Moderate assistance;Assist x2  Interventions: Safety awareness training;Verbal cues; Tactile cues;Manual cues  Gait:     Base of Support: Center of gravity altered;Narrowed; Shift to right  Speed/Clotilde: Shuffled;Slow;Delayed  Step Length: Right shortened;Left shortened  Gait Abnormalities: Decreased step clearance; Hemiplegic;Trunk sway increased  Distance (ft): 5 Feet (ft)(side steps edge of bed)  Assistive Device: Walker luke;Gait belt  Ambulation - Level of Assistance: Moderate assistance;Assist x2      Body Structures Involved:  1. Nerves  2. Voice/Speech  3. Bones  4. Joints  5. Muscles Body Functions Affected:  1. Mental  2. Sensory/Pain  3. Neuromusculoskeletal  4. Movement Related Activities and Participation Affected:  1. General Tasks and Demands  2. Mobility  3. Self Care  4. Interpersonal Interactions and Relationships   Number of elements that affect the Plan of Care: 4+: HIGH COMPLEXITY   CLINICAL PRESENTATION:   Presentation: Evolving clinical presentation with changing clinical characteristics: MODERATE COMPLEXITY   CLINICAL DECISION MAKIN [facility] Mobility Inpatient Short Form  How much difficulty does the patient currently have. .. Unable A Lot A Little None   1. Turning over in bed (including adjusting bedclothes, sheets and blankets)? [] 1   [] 2   [x] 3   [] 4   2. Sitting down on and standing up from a chair with arms ( e.g., wheelchair, bedside commode, etc.)   [] 1   [x] 2   [] 3   [] 4   3. Moving from lying on back to sitting on the side of the bed? [] 1   [] 2   [x] 3   [] 4   How much help from another person does the patient currently need. .. Total A Lot A Little None   4. Moving to and from a bed to a chair (including a wheelchair)? [] 1   [x] 2   [] 3   [] 4   5. Need to walk in hospital room? [] 1   [x] 2   [] 3   [] 4   6. Climbing 3-5 steps with a railing?    [] 1   [x] 2   [] 3   [] 4   © 2007, Trustees of [address] [place] [address] [address] [address], under license to [employer]. All rights reserved      Score:  Initial: 13 Most Recent: 14 (Date:1/17/2020)    Interpretation of Tool:  Represents activities that are increasingly more difficult (i.e. Bed mobility, Transfers, Gait). Medical Necessity:     · Patient is expected to demonstrate progress in   · strength, range of motion, balance, coordination, and functional technique  ·  to   · increase independence with all mobility. · .  Reason for Services/Other Comments:  · Patient continues to require skilled intervention due to   · medical complications and mobility deficits which impact his level of function, safety, and independence as indicated above. · .   Use of outcome tool(s) and clinical judgement create a POC that gives a: Questionable prediction of patient's progress: MODERATE COMPLEXITY        TREATMENT:   (In addition to Assessment/Re-Assessment sessions the following treatments were rendered)   Pre-treatment Symptoms/Complaints:  Ready to get back to bed. Pain: Initial:   6/10 L side, RN notified. Post Session:  Resting comfortably in bed     Neuromuscular Re-education: ( 13 Minutes): Bed mobility, sitting balance edge of bed,  supported standing balance control training, attempts at side steps edge of bed with luke walker, and stand pivot back to bed (pivoting to R side) to improve balance, coordination, kinesthetic sense, posture and proprioception. Required maximal visual, verbal, manual and tactile cues to promote static and dynamic balance in standing, promote coordination of left, upper extremity(s), lower extremity(s) and promote motor control of left, upper extremity(s), lower extremity(s).           Braces/Orthotics/Lines/Etc:   · O2 Device: Room Air   Treatment/Session Assessment:    · Response to Treatment:  See above  · Interdisciplinary Collaboration:   o Physical Therapist  o Registered Nurse  o Rehabilitation Attendant  · After treatment position/precautions:   o Supine in bed  o Bed alarm/tab alert on  o Bed/Chair-wheels locked  o Bed in low position  o Call light within reach  o RN notified   · Compliance with Program/Exercises: Compliant all of the time  · Recommendations/Intent for next treatment session: \"Next visit will focus on advancements to more challenging activities and reduction in assistance provided\".     Total Treatment Duration:  PT Patient Time In/Time Out  Time In: 1308  Time Out: [address], PT

## 2020-01-18 LAB
GLUCOSE BLD STRIP.AUTO-MCNC: 115 MG/DL (ref 65–100)
GLUCOSE BLD STRIP.AUTO-MCNC: 97 MG/DL (ref 65–100)

## 2020-01-18 PROCEDURE — 74011250637 HC RX REV CODE- 250/637: Performed by: HOSPITALIST

## 2020-01-18 PROCEDURE — 82962 GLUCOSE BLOOD TEST: CPT

## 2020-01-18 PROCEDURE — 74011250636 HC RX REV CODE- 250/636: Performed by: INTERNAL MEDICINE

## 2020-01-18 PROCEDURE — 74011250637 HC RX REV CODE- 250/637: Performed by: INTERNAL MEDICINE

## 2020-01-18 PROCEDURE — 65270000029 HC RM PRIVATE

## 2020-01-18 RX ORDER — INSULIN LISPRO 100 [IU]/ML
INJECTION, SOLUTION INTRAVENOUS; SUBCUTANEOUS
Status: DISCONTINUED | OUTPATIENT
Start: 2020-01-18 | End: 2020-01-22

## 2020-01-18 RX ORDER — INSULIN LISPRO 100 [IU]/ML
INJECTION, SOLUTION INTRAVENOUS; SUBCUTANEOUS
Status: DISCONTINUED | OUTPATIENT
Start: 2020-01-19 | End: 2020-01-18

## 2020-01-18 RX ADMIN — METOPROLOL SUCCINATE 25 MG: 25 TABLET, FILM COATED, EXTENDED RELEASE ORAL at 08:48

## 2020-01-18 RX ADMIN — METFORMIN HYDROCHLORIDE 500 MG: 500 TABLET, FILM COATED ORAL at 17:37

## 2020-01-18 RX ADMIN — ACETAMINOPHEN 650 MG: 325 TABLET, FILM COATED ORAL at 17:37

## 2020-01-18 RX ADMIN — GUAIFENESIN 100 MG: 100 SOLUTION ORAL at 17:37

## 2020-01-18 RX ADMIN — ATORVASTATIN CALCIUM 80 MG: 80 TABLET, FILM COATED ORAL at 21:33

## 2020-01-18 RX ADMIN — ACETAMINOPHEN 650 MG: 325 TABLET, FILM COATED ORAL at 08:48

## 2020-01-18 RX ADMIN — ASPIRIN 81 MG 81 MG: 81 TABLET ORAL at 08:48

## 2020-01-18 RX ADMIN — LISINOPRIL 40 MG: 20 TABLET ORAL at 08:48

## 2020-01-18 RX ADMIN — METFORMIN HYDROCHLORIDE 500 MG: 500 TABLET, FILM COATED ORAL at 08:00

## 2020-01-18 RX ADMIN — GUAIFENESIN 100 MG: 100 SOLUTION ORAL at 08:47

## 2020-01-18 RX ADMIN — ENOXAPARIN SODIUM 40 MG: 40 INJECTION SUBCUTANEOUS at 14:13

## 2020-01-18 NOTE — PROGRESS NOTES
Hospitalist Progress Note     Admit Date:  2019  9:07 AM   Name:  Gifty Johnson   Age:  53 y.o.  :  1975   MRN:  [id number]   PCP:  Joss Chiang MD      Subjective:     52 y/o smoker with DM, HTN, who presented to ER  with L leg and arm weakness, L facial droop, dysarthria. First noted L leg weakness late night  when he woke up to go to the bathroom. He was normal when he went to bed around 1030. EMS called. Noted to have slurred speech and L facial droop as well. Code S called in ER around 9am.  MRI with acute infarcts in L cerebellar hemisphere, deep frontoparietal white matter, and R paramedian yariel. Also noted old lacunar infarcts. No large vessel occlusion on CTA, but some stenosis noted. He was started on asa, statins. Neurology consulted. An ECHO showed PFO. Plan for cardiology referral and evaluate for closure as outpatient. PT/OT suggested inpatient rehabilitation. The patient is uninsured and the only option at this moment is IRC. Today, L weakness unchanged, No ac events, no significant resp spx, tolerates po fine, no difficulty swallowing, slept well    Objective:     Patient Vitals for the past 24 hrs:   Temp Pulse Resp BP SpO2   20 0800 97.9 °F (36.6 °C) 62 18 112/63 98 %   20 0400 97.5 °F (36.4 °C) 69 19 150/86 96 %   20 0000 98.6 °F (37 °C) 70 19 122/77 96 %   20 2000 98.4 °F (36.9 °C) 63 19 126/80 98 %   20 1600 97.6 °F (36.4 °C) 60 19 114/63 97 %   20 1200 97.7 °F (36.5 °C) 63 18 116/77 98 %     Oxygen Therapy  O2 Sat (%): 98 % (20 0800)  Pulse via Oximetry: 56 beats per minute (20)  O2 Device: Room air (20 193)  No intake or output data in the 24 hours ending 20 1129      REVIEW OF SYSTEMS: Comprehensive ROS performed and negative except as stated in HPI. Physical Examination:  General:          Well nourished. Alert and oriented. Mild distress  CV:                  RRR.   No murmur, rub, or gallop. Lungs:             Clear to auscultation bilaterally. No wheezing, rhonchi, or rales  Extremities:     Warm and dry. No cyanosis or edema. Neurologic:      CN II-XII intact except for mild L facial droop. Sensation intact. PERRLA.  dysarthria. No                          confusion. STR o/5 LUE and LLE. Dysarthria noted. Skin:                No rashes or jaundice. No wounds. Psych:             Appears depressed    Data Review:  I have reviewed all labs, meds, telemetry events, and studies from the last 24 hours.     Recent Results (from the past 24 hour(s))   GLUCOSE, POC    Collection Time: 01/17/20  3:59 PM   Result Value Ref Range    Glucose (POC) 82 65 - 100 mg/dL   GLUCOSE, POC    Collection Time: 01/17/20  9:01 PM   Result Value Ref Range    Glucose (POC) 83 65 - 100 mg/dL   GLUCOSE, POC    Collection Time: 01/18/20  6:59 AM   Result Value Ref Range    Glucose (POC) 115 (H) 65 - 100 mg/dL   GLUCOSE, POC    Collection Time: 01/18/20 10:22 AM   Result Value Ref Range    Glucose (POC) 97 65 - 100 mg/dL        All Micro Results     None          Current Meds:  Current Facility-Administered Medications   Medication Dose Route Frequency    insulin lispro (HUMALOG) injection   SubCUTAneous ACB    metFORMIN (GLUCOPHAGE) tablet 500 mg  500 mg Oral BID WITH MEALS    metoprolol succinate (TOPROL-XL) XL tablet 25 mg  25 mg Oral DAILY    polyethylene glycol (MIRALAX) packet 17 g  17 g Oral DAILY PRN    guaiFENesin (ROBITUSSIN) 100 mg/5 mL oral liquid 100 mg  100 mg Oral Q4H PRN    hydrALAZINE (APRESOLINE) 20 mg/mL injection 20 mg  20 mg IntraVENous Q4H PRN    atorvastatin (LIPITOR) tablet 80 mg  80 mg Oral QHS    lisinopril (PRINIVIL, ZESTRIL) tablet 40 mg  40 mg Oral DAILY    sodium chloride (NS) flush 5-40 mL  5-40 mL IntraVENous PRN    ondansetron (ZOFRAN) injection 4 mg  4 mg IntraVENous Q4H PRN    aspirin chewable tablet 81 mg  81 mg Oral DAILY    acetaminophen (TYLENOL) tablet 650 mg  650 mg Oral Q4H PRN    enoxaparin (LOVENOX) injection 40 mg  40 mg SubCUTAneous Q24H    dextrose 40% (GLUTOSE) oral gel 1 Tube  15 g Oral PRN    glucagon (GLUCAGEN) injection 1 mg  1 mg IntraMUSCular PRN    dextrose (D50W) injection syrg 12.5-25 g  25-50 mL IntraVENous PRN       Diet:  DIET NUTRITIONAL SUPPLEMENTS  DIET DIABETIC CONSISTENT CARB    Other Studies (last 24 hours):  No results found. Assessment and Plan:     Hospital Problems as of 1/18/2020 Date Reviewed: 3/3/2017          Codes Class Noted - Resolved POA    PFO (patent foramen ovale) ICD-10-CM: Q21.1  ICD-9-CM: 745.5  12/17/2019 - Present Yes        Arrhythmia ICD-10-CM: I49.9  ICD-9-CM: 427.9  12/15/2019 - Present Yes        Hyperlipemia ICD-10-CM: E78.5  ICD-9-CM: 272.4  12/15/2019 - Present Yes        * (Principal) Acute embolic stroke [facility]) DPQ-55-AS: I63.9  ICD-9-CM: 434.11  12/12/2019 - Present Yes        Hypertension (Chronic) ICD-10-CM: I10  ICD-9-CM: 401.9  Unknown - Present Yes        Type 2 diabetes mellitus (HCC) (Chronic) ICD-10-CM: E11.9  ICD-9-CM: 250.00  Unknown - Present Yes              A/P:    Acute embolic stroke  Continues to have left sided neglect  Evaluated by cardiology and neurology  -MRI head: with acute infarcts in L cerebellar hemisphere, deep frontoparietal white matter, and R paramedian yariel. Also noted old lacunar infarcts.    -CTA head: No large vessel occlusion   -ISMAEL: 3 mm PFO     Plan:  -Cont statin, ASA  -PT/OT/ST  -Needs outpatient cardiology follow-up for event monitor and PFO closure.      Active Problems:     Hypertension  BP well controlled at this time  Episodes of bradycardia noted     Plan:  -Continue metoprolol; will adjust dose if repeat episodes of bradycardia  -Continue amlodipine     Type 2 diabetes mellitus - cont on Lantus and SSI     Arrhythmia - had brief wide complex tachycardia early in admission, will need event monitor as outpt per cards     Hyperlipemia - statin therapy     PFO (patent foramen ovale) - will need closure as outpatient per cardiology     Dysphagia: diet per ST    Dispo; AWAITING  medicaid approval and placement

## 2020-01-18 NOTE — PROGRESS NOTES
01/18/20 0713   NIH Stroke Scale   Interval Other (comment)   LOC 0   LOC Questions 0   LOC Commands 0   Best Gaze 0   Visual 0   Facial Palsy 1   Motor Right Arm 0   Motor Left Arm 4   Motor Right Leg 0   Motor Left Leg 4   Limb Ataxia 0   Sensory 0   Best Language 1   Dysarthria 1   Extinction and Inattention 0   Total 11   dual nih with al rn

## 2020-01-18 NOTE — PROGRESS NOTES
01/18/20 0713   NIH Stroke Scale   Interval Other (comment)  (Simultaneous filing. User may not have seen previous data.)   LOC 0  (Simultaneous filing. User may not have seen previous data.)   LOC Questions 0  (Simultaneous filing. User may not have seen previous data.)   LOC Commands 0  (Simultaneous filing. User may not have seen previous data.)   Best Gaze 0  (Simultaneous filing. User may not have seen previous data.)   Visual 0  (Simultaneous filing. User may not have seen previous data.)   Facial Palsy 1  (Simultaneous filing. User may not have seen previous data.)   Motor Right Arm 0  (Simultaneous filing. User may not have seen previous data.)   Motor Left Arm 4  (Simultaneous filing. User may not have seen previous data.)   Motor Right Leg 0  (Simultaneous filing. User may not have seen previous data.)   Motor Left Leg 4  (Simultaneous filing. User may not have seen previous data.)   Limb Ataxia 0  (Simultaneous filing. User may not have seen previous data.)   Sensory 0  (Simultaneous filing. User may not have seen previous data.)   Best Language 1  (Simultaneous filing. User may not have seen previous data.)   Dysarthria 1  (Simultaneous filing. User may not have seen previous data.)   Extinction and Inattention 0  (Simultaneous filing. User may not have seen previous data.)   Total 11  (Simultaneous filing.  User may not have seen previous data.)

## 2020-01-18 NOTE — PROGRESS NOTES
01/17/20 1950   NIH Stroke Scale   LOC 0   LOC Questions 0   LOC Commands 0   Best Gaze 0   Visual 0   Facial Palsy 1   Motor Right Arm 0   Motor Left Arm 4   Motor Right Leg 0   Motor Left Leg 4   Limb Ataxia 0   Sensory 0   Best Language 1   Dysarthria 1   Extinction and Inattention 0   Total 11

## 2020-01-19 LAB — GLUCOSE BLD STRIP.AUTO-MCNC: 83 MG/DL (ref 65–100)

## 2020-01-19 PROCEDURE — 65270000029 HC RM PRIVATE

## 2020-01-19 PROCEDURE — 82962 GLUCOSE BLOOD TEST: CPT

## 2020-01-19 PROCEDURE — 74011250637 HC RX REV CODE- 250/637: Performed by: HOSPITALIST

## 2020-01-19 PROCEDURE — 74011250636 HC RX REV CODE- 250/636: Performed by: INTERNAL MEDICINE

## 2020-01-19 PROCEDURE — 74011250637 HC RX REV CODE- 250/637: Performed by: INTERNAL MEDICINE

## 2020-01-19 RX ADMIN — METFORMIN HYDROCHLORIDE 500 MG: 500 TABLET, FILM COATED ORAL at 08:49

## 2020-01-19 RX ADMIN — LISINOPRIL 40 MG: 20 TABLET ORAL at 08:49

## 2020-01-19 RX ADMIN — METFORMIN HYDROCHLORIDE 500 MG: 500 TABLET, FILM COATED ORAL at 17:13

## 2020-01-19 RX ADMIN — ATORVASTATIN CALCIUM 80 MG: 80 TABLET, FILM COATED ORAL at 22:10

## 2020-01-19 RX ADMIN — ACETAMINOPHEN 650 MG: 325 TABLET, FILM COATED ORAL at 22:10

## 2020-01-19 RX ADMIN — GUAIFENESIN 100 MG: 100 SOLUTION ORAL at 22:10

## 2020-01-19 RX ADMIN — ENOXAPARIN SODIUM 40 MG: 40 INJECTION SUBCUTANEOUS at 13:32

## 2020-01-19 RX ADMIN — METOPROLOL SUCCINATE 25 MG: 25 TABLET, FILM COATED, EXTENDED RELEASE ORAL at 08:49

## 2020-01-19 RX ADMIN — ASPIRIN 81 MG 81 MG: 81 TABLET ORAL at 08:49

## 2020-01-19 RX ADMIN — GUAIFENESIN 100 MG: 100 SOLUTION ORAL at 08:49

## 2020-01-19 RX ADMIN — ACETAMINOPHEN 650 MG: 325 TABLET, FILM COATED ORAL at 08:49

## 2020-01-19 NOTE — PROGRESS NOTES
01/19/20 1847   NIH Stroke Scale   Interval Other (comment)   LOC 0   LOC Questions 0   LOC Commands 0   Best Gaze 0   Visual 0   Facial Palsy 1   Motor Right Arm 0   Motor Left Arm 4   Motor Right Leg 0   Motor Left Leg 4   Limb Ataxia 0   Sensory 0   Best Language 1   Dysarthria 1   Extinction and Inattention 0   Total 11

## 2020-01-19 NOTE — PROGRESS NOTES
Progress Note    2020  Admit Date: 2019  9:07 AM   NAME: Britney Burgess   :  1975   MRN:  [id number]   Attending: Henrry Miramontes MD  PCP:  Kush Baca MD  Treatment Team: Attending Provider: Henrry Miramontes MD; Consulting Provider: Mirna Bay MD; Care Manager: Ascencion Mccauley, RN; Care Manager: Brandon Nuñez LMSW; Consulting Provider: Alicia Condon MD; Utilization Review: Danilo Nath RN; Nurse Practitioner: Jimi Burroughs NP; Nurse Practitioner: Ildefonso Block NP    Full Code   SUBJECTIVE:   Mr. Karlos Alvarenga is a 54 yo male with PMH of DM II, HTN who presented 19 with c/o left leg and arm weakness, left facial droop and dysarthria. Code S called. MRI with acute infarctions in left cerebellar hemisphere, deep frontoparietal white matter and right paramedian yariel. Also noted to have old lacunar infarcts. CTA without large vessel occlusions, however some stenosis noted. Echo showed PFO, Cardiology to f/u outpatient for evaluation for closure. Neurology consulted. Started on ASA, statin. Pt is uninsured, difficult placement, case management working on plan. Today denies new complaints.      Past Medical History:   Diagnosis Date    Acute ischemic stroke ([address] 2019    Acute pancreatitis 2014    Cerebrovascular accident (CVA) due to embolism ([address] 2019    Diabetes ([address] 2002    Diabetes ([address]     Diabetes mellitus     Hypertension      Recent Results (from the past 24 hour(s))   GLUCOSE, POC    Collection Time: 20  7:33 AM   Result Value Ref Range    Glucose (POC) 83 65 - 100 mg/dL     No Known Allergies  Current Facility-Administered Medications   Medication Dose Route Frequency Provider Last Rate Last Dose    insulin lispro (HUMALOG) injection   SubCUTAneous ACB Henrry Miramontes MD   Stopped at 20 0800    metFORMIN (GLUCOPHAGE) tablet 500 mg  500 mg Oral BID WITH MEALS Henrry Miramontse MD   500 mg at 20 0849    metoprolol succinate (TOPROL-XL) XL tablet 25 mg  25 mg Oral DAILY Henrry Miramontes MD   25 mg at 20 0849    polyethylene glycol (MIRALAX) packet 17 g  17 g Oral DAILY PRN Larry Ibrahim C, DO   17 g at 20 5763    guaiFENesin (ROBITUSSIN) 100 mg/5 mL oral liquid 100 mg  100 mg Oral Q4H PRN Henrry Miramontes MD   100 mg at 20 0849    hydrALAZINE (APRESOLINE) 20 mg/mL injection 20 mg  20 mg IntraVENous Q4H PRN Patrick Christie MD   20 mg at 19 0133    atorvastatin (LIPITOR) tablet 80 mg  80 mg Oral QHS Patrick Christie MD   80 mg at 20 2133    lisinopril (PRINIVIL, ZESTRIL) tablet 40 mg  40 mg Oral DAILY Patrick Christie MD   40 mg at 20 0849    sodium chloride (NS) flush 5-40 mL  5-40 mL IntraVENous PRN Patrick Christie MD   5 mL at 01/15/20 1529    ondansetron (ZOFRAN) injection 4 mg  4 mg IntraVENous Q4H PRN Patrick Christie MD        aspirin chewable tablet 81 mg  81 mg Oral DAILY Patrick Christie MD   81 mg at 20 7980    acetaminophen (TYLENOL) tablet 650 mg  650 mg Oral Q4H PRN Patrick Christie MD   650 mg at 20 0849    enoxaparin (LOVENOX) injection 40 mg  40 mg SubCUTAneous Q24H Patrick Christie MD   40 mg at 20 1413    dextrose 40% (GLUTOSE) oral gel 1 Tube  15 g Oral PRN Patrick Christie MD        glucagon [facility]) injection 1 mg  1 mg IntraMUSCular PRN Patrick Christie MD        dextrose (D50W) injection syrg 12.5-25 g  25-50 mL IntraVENous PRN Patrick Christie MD           Review of Systems negative with exception of pertinent positives noted above  PHYSICAL EXAM     Visit Vitals  /79 (BP 1 Location: Right arm, BP Patient Position: At rest)   Pulse 86   Temp 98.5 °F (36.9 °C)   Resp 18   Ht 5' 6\" (1.676 m)   Wt 95.3 kg (210 lb)   SpO2 97%   BMI 33.89 kg/m²      Temp (24hrs), Av.3 °F (36.8 °C), Min:98 °F (36.7 °C), Max:98.6 °F (37 °C)    Oxygen Therapy  O2 Sat (%): 97 % (20 1200)  Pulse via Oximetry: 56 beats per minute (01/05/20 1918)  O2 Device: Room air (01/05/20 1930)  No intake or output data in the 24 hours ending 01/19/20 1215   General: No acute distress    Lungs: CTA bilaterally. Resp even and nonlabored  Heart:  S1S2 present without murmurs rubs gallops. RRR. No LE edema  Abdomen: Soft, non tender, non distended. BS present  Extremities: No cyanosis. Left UE/LE flaccid  Neurologic:  A/O X3. Dysarthria noted. Left facial droop. Sensation intact. LUE/LLE flaccid. Results summary of Diagnostic Studies/Procedures copied from within [facility] EMR:        [address] Problems    Diagnosis Date Noted    PFO (patent foramen ovale) 12/17/2019    Arrhythmia 12/15/2019    Hyperlipemia 93/83/1624    Acute embolic stroke ([facility] 41/38/4776    Type 2 diabetes mellitus ([facility]     Hypertension      Plan:    Acute embolic stroke  Evaluated by cardiology and neurology  MRI head: with acute infarcts in L cerebellar hemisphere, deep frontoparietal white matter, and R paramedian yariel.  Also noted old lacunar infarcts. CTA head: No large vessel occlusion   ISMAEL: 3 mm PFO, needs to f/u outpatient with Cardiology for evaluation for closure   Cont statin, ASA  PT/OT/ST    Hypertension  BP controlled  Continue metoprolol, amlodipine     Type 2 diabetes mellitus   A1C 8.4  Continue  Lantus and SSI     Arrhythmia   had brief wide complex tachycardia early in admission, will need event monitor as outpt per cards     Hyperlipemia   statin      PFO (patent foramen ovale)   will need closure as outpatient per cardiology     Dysphagia  diet per ST, mechanical soft, no straws      Notes, labs, VS, diagnostic testing reviewed  Time spent with pt 35 min      DVT Prophylaxis: lovenox  Plan of Care Discussed with: Supervising MD  Dr. Sylvester Kiaser, care team, pt      Difficult placement, case management working on plan.        Leidy Fitzpatrick NP

## 2020-01-19 NOTE — PROGRESS NOTES
01/18/20 1918   NIH Stroke Scale   Interval Other (comment)   LOC 0   LOC Questions 0   LOC Commands 0   Best Gaze 0   Visual 0   Facial Palsy 1   Motor Right Arm 0   Motor Left Arm 4   Motor Right Leg 0   Motor Left Leg 4   Limb Ataxia 0   Sensory 0   Best Language 1   Dysarthria 1   Extinction and Inattention 0   Total 11

## 2020-01-20 LAB — GLUCOSE BLD STRIP.AUTO-MCNC: 125 MG/DL (ref 65–100)

## 2020-01-20 PROCEDURE — 74011250637 HC RX REV CODE- 250/637: Performed by: HOSPITALIST

## 2020-01-20 PROCEDURE — 82962 GLUCOSE BLOOD TEST: CPT

## 2020-01-20 PROCEDURE — 92507 TX SP LANG VOICE COMM INDIV: CPT

## 2020-01-20 PROCEDURE — 74011250637 HC RX REV CODE- 250/637: Performed by: INTERNAL MEDICINE

## 2020-01-20 PROCEDURE — 74011250636 HC RX REV CODE- 250/636: Performed by: INTERNAL MEDICINE

## 2020-01-20 PROCEDURE — 65270000029 HC RM PRIVATE

## 2020-01-20 RX ADMIN — METOPROLOL SUCCINATE 25 MG: 25 TABLET, FILM COATED, EXTENDED RELEASE ORAL at 08:49

## 2020-01-20 RX ADMIN — METFORMIN HYDROCHLORIDE 500 MG: 500 TABLET, FILM COATED ORAL at 17:09

## 2020-01-20 RX ADMIN — LISINOPRIL 40 MG: 20 TABLET ORAL at 08:48

## 2020-01-20 RX ADMIN — ENOXAPARIN SODIUM 40 MG: 40 INJECTION SUBCUTANEOUS at 14:16

## 2020-01-20 RX ADMIN — GUAIFENESIN 100 MG: 100 SOLUTION ORAL at 23:24

## 2020-01-20 RX ADMIN — METFORMIN HYDROCHLORIDE 500 MG: 500 TABLET, FILM COATED ORAL at 08:49

## 2020-01-20 RX ADMIN — GUAIFENESIN 100 MG: 100 SOLUTION ORAL at 08:49

## 2020-01-20 RX ADMIN — ATORVASTATIN CALCIUM 80 MG: 80 TABLET, FILM COATED ORAL at 23:24

## 2020-01-20 RX ADMIN — ACETAMINOPHEN 650 MG: 325 TABLET, FILM COATED ORAL at 23:24

## 2020-01-20 RX ADMIN — ACETAMINOPHEN 650 MG: 325 TABLET, FILM COATED ORAL at 08:49

## 2020-01-20 RX ADMIN — ASPIRIN 81 MG 81 MG: 81 TABLET ORAL at 08:48

## 2020-01-20 NOTE — PROGRESS NOTES
01/20/20 0744   NIH Stroke Scale   Interval Other (comment)  (dual [facility] with Al, RN)   LOC 0   LOC Questions 0   LOC Commands 0   Best Gaze 0   Visual 0   Facial Palsy 1   Motor Right Arm 0   Motor Left Arm 4   Motor Right Leg 0   Motor Left Leg 3   Limb Ataxia 0   Sensory 0   Best Language 1   Dysarthria 1   Extinction and Inattention 0   Total 10     Dual [facility] with Al, RN

## 2020-01-20 NOTE — PROGRESS NOTES
Dysphagia(re evaluation 1/7/2020)  LTG: Patient will tolerate least restrictive diet without overt signs or symptoms of airway compromise. STG: Patient will tolerate mechanical soft diet(ground meats/chopped vegetables) and thin liquids by cup without overt signs or symptoms of airway compromise. STG: Patient will demonstrate use of compensatory strategies to improve swallow safety/function with 90% accuracy given minimal cueing  STG: Patient participate in exercises to improve oral motor movement with 80% accuracy given minimal cueing  STG: Patient will participate in modified barium swallow study as clinically indicated. Neurolinguistic Goals:  LTG: Patient will increase neuro-linguistic abilities to increase safety and awareness in functional living environment   STG: Patient will participate in speech/language/cognitive evaluation.  MET 12/17/19  STG: Patient will solve mathematical problems with 80%accuracy given minimal cueing   STG: Patient will name 10 items to concrete category in 1 minute given minimal cueing  STG: Patient will participate in verbal reasoning tasks with 80% accuracy given minimal cueing  STG: Patient will complete mental manipulation tasks with 80% accuracy given minimal cueing  STG: Patient will complete working memory/attention tasks with 80% accuracy given minimal cueing  STG: Patient will recall relevant verbal information with 80% accuracy with minimal assistance  STG: Patient will utilize memory compensatory strategies to improve recall of functional list/message with 90%accuracy given minimal assistance  STG: Patient will participate in ongoing cognitive linguistic evaluation for therapeutic assessment     SPEECH LANGUAGE PATHOLOGY: DYSPHAGIA and SPEECH/LANGUAGE- Daily Note 5    NAME/AGE/GENDER: Britney Burgess is a 53 y.o. male  DATE: 1/20/2020  PRIMARY DIAGNOSIS: Acute ischemic stroke ([facility] [I63.9]  Cerebrovascular accident (CVA) due to embolism ([facility] [I63.9]      ICD-10: Treatment Diagnosis: R13.12 Dysphagia, Oropharyngeal Phase    INTERDISCIPLINARY COLLABORATION: Registered Nurse  PRECAUTIONS/ALLERGIES: Patient has no known allergies. SUBJECTIVE   Alert upright in bed for session. Reports being tired, but agreeable to participate. Orientation:   Person  Place  Time  Situation    Pain: Pain Scale 1: Numeric (0 - 10)  Pain Intensity 1: 0     OBJECTIVE   Swallowing:   Not addressed during session    Speech/cognitive linguistic treatment:   Functional recall: recalled 3/3 compensatory strategies for improving speech intelligibility and 4/4 items consumed for lunch meal  Functional reasoning for accessing information on smart phone: patient demonstrate ability to locate functional information on cell phone (ie demo \"where do you go to find a phone number you didn't know) with 3/3 accurate independently. Functional reasoning for daily math: utilizing calculator on phone, patient completed functional math word problems with addition, subtraction, multiplication, and division with 6/6 accurate independently. Verbal reasoning/naming objects by attributes: 7/10 accurate given minimal verbal cues. ASSESSMENT   Swallowing: Not addressed. Continue current diet: mechanical soft diet/thin liquids by cup only. No straws. Speech/Cognitive Function:  Patient with improved reasoning for functional math calculations with use of calculator. Patient able to determine how to calculate word problems independently. Improved functional recall of recent memory information this date. Patient continues to have reduced abstract reasoning in verbal reasoning tasks. He also continues to require moderate verbal cues to improve speech intelligibility due to dysarthria. Recommend ongoing skilled intervention to improve swallow/function strength and cognitive linguistic abilities.           Tool Used: Dysphagia Outcome and Severity Scale (ROCHELLE)    Score Comments   Normal Diet  [] 7 With no strategies or extra time needed   Functional Swallow  [] 6 May have mild oral or pharyngeal delay   Mild Dysphagia  [] 5 Which may require one diet consistency restricted    Mild-Moderate Dysphagia  [] 4 With 1-2 diet consistencies restricted   Moderate Dysphagia  [] 3 With 2 or more diet consistencies restricted   Moderate-Severe Dysphagia  [] 2 With partial PO strategies (trials with ST only)   Severe Dysphagia  [] 1 With inability to tolerate any PO safely      Score:  Initial:4 Most Recent: 4 (Date 01/20/20 )   Interpretation of Tool: The Dysphagia Outcome and Severity Scale (ROCHELLE) is a simple, easy-to-use, 7-point scale developed to systematically rate the functional severity of dysphagia based on objective assessment and make recommendations for diet level, independence level, and type of nutrition. PLAN    FREQUENCY/DURATION: Continue to follow patient 3 times a week for duration of hospital stay to address above goals. - Recommendations for next treatment session: Next treatment will address dysarthria, dysphagia, cognition     REHABILITATION POTENTIAL FOR STATED GOALS: Good     COMPLIANCE WITH PROGRAM/EXERCISES: Will assess as treatment progresses    CONTINUATION OF SKILLED SERVICES/MEDICAL NECESSITY:   Patient is expected to demonstrate progress in  swallow strength, swallow timeliness, swallow function, diet tolerance and swallow safety in order to  improve swallow safety, work toward diet advancement and decrease aspiration risk.  Patient continues to require skilled intervention due to dysphagia.           RECOMMENDATIONS   DIET:    PO:  Mechanical soft with ground meat/chopped vegatables   Liquids:  regular thin by cup only    MEDICATIONS: Crushed in puree     ASPIRATION PRECAUTIONS  · Slow rate of intake  · Small bites/sips  · Upright at 90 degrees during meal     COMPENSATORY STRATEGIES/MODIFICATIONS  · Alternate liquids/solids  · Small sips and bites  · Slow rate  · NO STRAWS EDUCATION:  · Recommendations discussed with Nursing  · Patient     RECOMMENDATIONS for CONTINUED SPEECH THERAPY: YES: Anticipate need for ongoing speech therapy during this hospitalization and at next level of care.          SAFETY:  After treatment position/precautions:  · Upright in bed  · Call light within reach    Total Treatment Duration:   Time In: 1404  Time Out: 8[address] [facility] [address], [address] [facility]

## 2020-01-20 NOTE — PROGRESS NOTES
01/19/20 2208   NIH Stroke Scale   Interval Other (comment)  (Neuro checks)   LOC 0   LOC Questions 0   LOC Commands 0   Motor Right Arm 0   Motor Left Arm 4   Motor Right Leg 0   Motor Left Leg 3   Limb Ataxia 0   Sensory 0   Dysarthria 1     Neuro checks

## 2020-01-20 NOTE — PROGRESS NOTES
01/20/20 0148   NIH Stroke Scale   Interval Other (comment)  (Neuro checks)   LOC 0   LOC Questions 0   LOC Commands 0   Motor Right Arm 0   Motor Left Arm 4   Motor Right Leg 0   Motor Left Leg 3   Dysarthria 1     Neuro checks

## 2020-01-20 NOTE — PROGRESS NOTES
01/20/20 0455   NIH Stroke Scale   Interval Other (comment)  (Neuro checks)   LOC 0   LOC Questions 0   LOC Commands 0   Motor Right Arm 0   Motor Left Arm 4   Motor Right Leg 0   Motor Left Leg 3   Dysarthria 1     Neuro checks

## 2020-01-20 NOTE — PROGRESS NOTES
Progress Note    2020  Admit Date: 2019  9:07 AM   NAME: Daina Pinzon   :  1975   MRN:  [id number]   Attending: Karishma Connelly MD;Macho PHILLIPS  PCP:  Juan Soares MD  Treatment Team: Attending Provider: Xiao Tran MD; Care Manager: Chantale Peña, RN; Care Manager: Callie Mir, [facility] [place]; Consulting Provider: Melita Obrien MD; Utilization Review: Josh Koenig RN; Nurse Practitioner: Alpesh Beltran NP; Primary Nurse: Eren Thapa, JERRY; Charge Nurse: Marie Serrano Speech Language Pathologist: Tyrel Davies, HYACINTH    Full Code     Mr. Teresa Christine is a 52 yo male with PMH of DM II, HTN who presented 19 with c/o left leg and arm weakness, left facial droop and dysarthria. Code S called. MRI with acute infarctions in left cerebellar hemisphere, deep frontoparietal white matter and right paramedian yariel. Also noted to have old lacunar infarcts. CTA without large vessel occlusions, however some stenosis noted. Echo showed PFO, Cardiology to f/u outpatient for evaluation for closure. Neurology consulted. Started on ASA, statin. Pt is uninsured, difficult placement, case management working on plan. SUBJECTIVE:   He was found in no distress.     Past Medical History:   Diagnosis Date    Acute ischemic stroke ([address] 2019    Acute pancreatitis 2014    Cerebrovascular accident (CVA) due to embolism ([address] 2019    Diabetes ([address] 2002    Diabetes ([address]     Diabetes mellitus     Hypertension      Recent Results (from the past 24 hour(s))   GLUCOSE, POC    Collection Time: 20  7:56 AM   Result Value Ref Range    Glucose (POC) 125 (H) 65 - 100 mg/dL     No Known Allergies  Current Facility-Administered Medications   Medication Dose Route Frequency Provider Last Rate Last Dose    insulin lispro (HUMALOG) injection   SubCUTAneous ACB Karishma Connelly MD   Stopped at 20 0800    metFORMIN (GLUCOPHAGE) tablet 500 mg  500 mg Oral BID WITH MEALS Sidhom, Venkata Savage MD   500 mg at 20 1713    metoprolol succinate (TOPROL-XL) XL tablet 25 mg  25 mg Oral DAILY Sohail Acuna MD   25 mg at 20 0849    polyethylene glycol (MIRALAX) packet 17 g  17 g Oral DAILY PRN Rio Carbine C, DO   17 g at 20 0451    guaiFENesin (ROBITUSSIN) 100 mg/5 mL oral liquid 100 mg  100 mg Oral Q4H PRN Sohail Acuna MD   100 mg at 20 2210    hydrALAZINE (APRESOLINE) 20 mg/mL injection 20 mg  20 mg IntraVENous Q4H PRN Al Resendiz MD   20 mg at 19 0133    atorvastatin (LIPITOR) tablet 80 mg  80 mg Oral QHS Al Resendiz MD   80 mg at 20 2210    lisinopril (PRINIVIL, ZESTRIL) tablet 40 mg  40 mg Oral DAILY Al Resendiz MD   40 mg at 20 0849    sodium chloride (NS) flush 5-40 mL  5-40 mL IntraVENous PRN Al Resendiz MD   5 mL at 01/15/20 1529    ondansetron [facility]) injection 4 mg  4 mg IntraVENous Q4H PRN Al Resendiz MD        aspirin chewable tablet 81 mg  81 mg Oral DAILY Al Resendiz MD   81 mg at 20 0849    acetaminophen (TYLENOL) tablet 650 mg  650 mg Oral Q4H PRN Al Resendiz MD   650 mg at 20 2210    enoxaparin (LOVENOX) injection 40 mg  40 mg SubCUTAneous Q24H Al Resendiz MD   40 mg at 20 1332    dextrose 40% (GLUTOSE) oral gel 1 Tube  15 g Oral PRN Al Resendiz MD        glucagon [facility]) injection 1 mg  1 mg IntraMUSCular PRN Al Resendiz MD        dextrose (D50W) injection syrg 12.5-25 g  25-50 mL IntraVENous PRN Al Resendiz MD           Review of Systems negative with exception of pertinent positives noted above  PHYSICAL EXAM     Visit Vitals  /84 (BP 1 Location: Right arm, BP Patient Position: At rest)   Pulse 71   Temp 98.3 °F (36.8 °C)   Resp 18   Ht 5' 6\" (1.676 m)   Wt 95.3 kg (210 lb)   SpO2 98%   BMI 33.89 kg/m²      Temp (24hrs), Av.4 °F (36.9 °C), Min:98.1 °F (36.7 °C), Max:98.8 °F (37.1 °C)    Oxygen Therapy  O2 Sat (%): 98 % (01/20/20 0800)  Pulse via Oximetry: 56 beats per minute (01/05/20 1918)  O2 Device: Room air (01/05/20 1930)  No intake or output data in the 24 hours ending 01/20/20 0823   General: No acute distress    Lungs: CTA bilaterally. Resp even and nonlabored  Heart:  S1S2 present without murmurs rubs gallops. RRR. No LE edema  Abdomen: Soft, non tender, non distended. BS present  Extremities: No cyanosis. Left UE/LE flaccid  Neurologic:  A/O X3. Dysarthria noted. Left facial droop. Sensation intact. LUE/LLE flaccid. Results summary of Diagnostic Studies/Procedures copied from within [facility] EMR:        Edmar Dixon 96 Problems    Diagnosis Date Noted    PFO (patent foramen ovale) 12/17/2019    Arrhythmia 12/15/2019    Hyperlipemia 21/79/9195    Acute embolic stroke ([facility] [address] 27/21/8590    Type 2 diabetes mellitus (HCC)     Hypertension      Plan:    -Acute embolic stroke   Dysphagia   MRI head: with acute infarcts in L cerebellar hemisphere, deep frontoparietal white matter, and R paramedian yariel.  Also noted old lacunar infarcts. ISMAEL: 3 mm PFO, needs to f/u outpatient with Cardiology for evaluation for closure   Cont statin, ASA  PT/OT/Speech     -Hypertension: continue home meds      -Type 2 diabetes mellitus:  Metformin and HISS     -Arrhythmia: had brief wide complex tachycardia early in admission, will need event monitor as outpt per cards    DVT Prophylaxis: lovenox    Difficult placement, case management working on plan. Patient has no medical insurance.       Dev Mcdaniels MD

## 2020-01-21 LAB
ALBUMIN SERPL-MCNC: 2.4 G/DL (ref 3.5–5)
ALBUMIN/GLOB SERPL: 0.6 {RATIO} (ref 1.2–3.5)
ALP SERPL-CCNC: 86 U/L (ref 50–136)
ALT SERPL-CCNC: 22 U/L (ref 12–65)
ANION GAP SERPL CALC-SCNC: 5 MMOL/L (ref 7–16)
AST SERPL-CCNC: 19 U/L (ref 15–37)
BASOPHILS # BLD: 0 K/UL (ref 0–0.2)
BASOPHILS NFR BLD: 1 % (ref 0–2)
BILIRUB SERPL-MCNC: 0.6 MG/DL (ref 0.2–1.1)
BUN SERPL-MCNC: 16 MG/DL (ref 6–23)
CALCIUM SERPL-MCNC: 10 MG/DL (ref 8.3–10.4)
CHLORIDE SERPL-SCNC: 110 MMOL/L (ref 98–107)
CO2 SERPL-SCNC: 27 MMOL/L (ref 21–32)
CREAT SERPL-MCNC: 1.2 MG/DL (ref 0.8–1.5)
DIFFERENTIAL METHOD BLD: ABNORMAL
EOSINOPHIL # BLD: 0.1 K/UL (ref 0–0.8)
EOSINOPHIL NFR BLD: 2 % (ref 0.5–7.8)
ERYTHROCYTE [DISTWIDTH] IN BLOOD BY AUTOMATED COUNT: 17.2 % (ref 11.9–14.6)
GLOBULIN SER CALC-MCNC: 4.2 G/DL (ref 2.3–3.5)
GLUCOSE BLD STRIP.AUTO-MCNC: 97 MG/DL (ref 65–100)
GLUCOSE SERPL-MCNC: 95 MG/DL (ref 65–100)
HCT VFR BLD AUTO: 37.6 % (ref 41.1–50.3)
HGB BLD-MCNC: 11.8 G/DL (ref 13.6–17.2)
IMM GRANULOCYTES # BLD AUTO: 0 K/UL (ref 0–0.5)
IMM GRANULOCYTES NFR BLD AUTO: 0 % (ref 0–5)
LYMPHOCYTES # BLD: 1.5 K/UL (ref 0.5–4.6)
LYMPHOCYTES NFR BLD: 44 % (ref 13–44)
MCH RBC QN AUTO: 25.6 PG (ref 26.1–32.9)
MCHC RBC AUTO-ENTMCNC: 31.4 G/DL (ref 31.4–35)
MCV RBC AUTO: 81.6 FL (ref 79.6–97.8)
MONOCYTES # BLD: 0.2 K/UL (ref 0.1–1.3)
MONOCYTES NFR BLD: 7 % (ref 4–12)
NEUTS SEG # BLD: 1.6 K/UL (ref 1.7–8.2)
NEUTS SEG NFR BLD: 46 % (ref 43–78)
NRBC # BLD: 0 K/UL (ref 0–0.2)
PLATELET # BLD AUTO: 164 K/UL (ref 150–450)
PMV BLD AUTO: 11.7 FL (ref 9.4–12.3)
POTASSIUM SERPL-SCNC: 4.1 MMOL/L (ref 3.5–5.1)
PROT SERPL-MCNC: 6.6 G/DL (ref 6.3–8.2)
RBC # BLD AUTO: 4.61 M/UL (ref 4.23–5.6)
SODIUM SERPL-SCNC: 142 MMOL/L (ref 136–145)
WBC # BLD AUTO: 3.5 K/UL (ref 4.3–11.1)

## 2020-01-21 PROCEDURE — 36415 COLL VENOUS BLD VENIPUNCTURE: CPT

## 2020-01-21 PROCEDURE — 74011250636 HC RX REV CODE- 250/636: Performed by: INTERNAL MEDICINE

## 2020-01-21 PROCEDURE — 65270000029 HC RM PRIVATE

## 2020-01-21 PROCEDURE — 85025 COMPLETE CBC W/AUTO DIFF WBC: CPT

## 2020-01-21 PROCEDURE — 74011250637 HC RX REV CODE- 250/637: Performed by: INTERNAL MEDICINE

## 2020-01-21 PROCEDURE — 80053 COMPREHEN METABOLIC PANEL: CPT

## 2020-01-21 PROCEDURE — 97530 THERAPEUTIC ACTIVITIES: CPT

## 2020-01-21 PROCEDURE — 82962 GLUCOSE BLOOD TEST: CPT

## 2020-01-21 PROCEDURE — 97110 THERAPEUTIC EXERCISES: CPT

## 2020-01-21 PROCEDURE — 74011250637 HC RX REV CODE- 250/637: Performed by: HOSPITALIST

## 2020-01-21 RX ADMIN — LISINOPRIL 40 MG: 20 TABLET ORAL at 09:00

## 2020-01-21 RX ADMIN — METFORMIN HYDROCHLORIDE 500 MG: 500 TABLET, FILM COATED ORAL at 09:00

## 2020-01-21 RX ADMIN — ACETAMINOPHEN 650 MG: 325 TABLET, FILM COATED ORAL at 19:25

## 2020-01-21 RX ADMIN — ATORVASTATIN CALCIUM 80 MG: 80 TABLET, FILM COATED ORAL at 21:03

## 2020-01-21 RX ADMIN — METOPROLOL SUCCINATE 25 MG: 25 TABLET, FILM COATED, EXTENDED RELEASE ORAL at 09:00

## 2020-01-21 RX ADMIN — GUAIFENESIN 100 MG: 100 SOLUTION ORAL at 19:25

## 2020-01-21 RX ADMIN — METFORMIN HYDROCHLORIDE 500 MG: 500 TABLET, FILM COATED ORAL at 16:29

## 2020-01-21 RX ADMIN — ASPIRIN 81 MG 81 MG: 81 TABLET ORAL at 09:00

## 2020-01-21 RX ADMIN — ENOXAPARIN SODIUM 40 MG: 40 INJECTION SUBCUTANEOUS at 16:01

## 2020-01-21 NOTE — PROGRESS NOTES
01/20/20 1600   NIH Stroke Scale   Interval Other (comment)   LOC 0   LOC Questions 0   LOC Commands 0   Best Gaze 0   Visual 0   Facial Palsy 1   Motor Right Arm 0   Motor Left Arm 4   Motor Right Leg 0   Motor Left Leg 2   Limb Ataxia 0   Sensory 0   Best Language 1   Dysarthria 1   Extinction and Inattention 0   Total 9

## 2020-01-21 NOTE — PROGRESS NOTES
01/20/20 2009   NIH Stroke Scale   Interval Other (comment)   LOC 0   LOC Questions 0   LOC Commands 0   Best Gaze 0   Visual 0   Facial Palsy 1   Motor Right Arm 0   Motor Left Arm 4   Motor Right Leg 0   Motor Left Leg 2   Limb Ataxia 0   Sensory 0   Best Language 1   Dysarthria 1   Extinction and Inattention 0   Total 9

## 2020-01-21 NOTE — PROGRESS NOTES
Progress Note    2020  Admit Date: 2019  9:07 AM   NAME: Priti Kelly   :  1975   MRN:  [id number]   Attending: Gaby Grande MD  PCP:  Zheng Urban MD  Treatment Team: Attending Provider: Gaby Grande MD; Care Manager: Christina Salcedo RN; Care Manager: Royce Ramirez, [facility] [place]; Consulting Provider: Beatriz Marin MD; Utilization Review: Mayuri Alonso RN; Nurse Practitioner: Aurora Trujillo NP; Primary Nurse: Christiane Escoto RN; Charge Nurse: Yves Dooley Speech Language Pathologist: Mary Wright; Physical Therapy Assistant: Nemesio Gonzalez PTA    Full Code     Mr. Jackie Anderson is a 54 yo male with PMH of DM II, HTN who presented 19 with c/o left leg and arm weakness, left facial droop and dysarthria. Code S called. MRI with acute infarctions in left cerebellar hemisphere, deep frontoparietal white matter and right paramedian yariel. Also noted to have old lacunar infarcts. CTA without large vessel occlusions, however some stenosis noted. Echo showed PFO, Cardiology to f/u outpatient for evaluation for closure. Neurology consulted. Started on ASA, statin. Pt is uninsured, difficult placement, case management working on plan. SUBJECTIVE:   Pt resting comfortably in bed. Voiced no complaints.    No fever, chills, diarrhea, nausea/vomiting    Past Medical History:   Diagnosis Date    Acute ischemic stroke ([facility] 2019    Acute pancreatitis 2014    Cerebrovascular accident (CVA) due to embolism ([facility] 2019    Diabetes ([facility] 2002    Diabetes ([facility]     Diabetes mellitus     Hypertension      Recent Results (from the past 24 hour(s))   CBC WITH AUTOMATED DIFF    Collection Time: 20  5:48 AM   Result Value Ref Range    WBC 3.5 (L) 4.3 - 11.1 K/uL    RBC 4.61 4.23 - 5.6 M/uL    HGB 11.8 (L) 13.6 - 17.2 g/dL    HCT 37.6 (L) 41.1 - 50.3 %    MCV 81.6 79.6 - 97.8 FL    MCH 25.6 (L) 26.1 - 32.9 PG    MCHC 31.4 31.4 - 35.0 g/dL    RDW 17.2 (H) 11.9 - 14.6 %    PLATELET 850 178 - 436 K/uL    MPV 11.7 9.4 - 12.3 FL    ABSOLUTE NRBC 0.00 0.0 - 0.2 K/uL    DF AUTOMATED      NEUTROPHILS 46 43 - 78 %    LYMPHOCYTES 44 13 - 44 %    MONOCYTES 7 4.0 - 12.0 %    EOSINOPHILS 2 0.5 - 7.8 %    BASOPHILS 1 0.0 - 2.0 %    IMMATURE GRANULOCYTES 0 0.0 - 5.0 %    ABS. NEUTROPHILS 1.6 (L) 1.7 - 8.2 K/UL    ABS. LYMPHOCYTES 1.5 0.5 - 4.6 K/UL    ABS. MONOCYTES 0.2 0.1 - 1.3 K/UL    ABS. EOSINOPHILS 0.1 0.0 - 0.8 K/UL    ABS. BASOPHILS 0.0 0.0 - 0.2 K/UL    ABS. IMM. GRANS. 0.0 0.0 - 0.5 K/UL   METABOLIC PANEL, COMPREHENSIVE    Collection Time: 01/21/20  5:48 AM   Result Value Ref Range    Sodium 142 136 - 145 mmol/L    Potassium 4.1 3.5 - 5.1 mmol/L    Chloride 110 (H) 98 - 107 mmol/L    CO2 27 21 - 32 mmol/L    Anion gap 5 (L) 7 - 16 mmol/L    Glucose 95 65 - 100 mg/dL    BUN 16 6 - 23 MG/DL    Creatinine 1.20 0.8 - 1.5 MG/DL    GFR est AA >60 >60 ml/min/1.73m2    GFR est non-AA >60 >60 ml/min/1.73m2    Calcium 10.0 8.3 - 10.4 MG/DL    Bilirubin, total 0.6 0.2 - 1.1 MG/DL    ALT (SGPT) 22 12 - 65 U/L    AST (SGOT) 19 15 - 37 U/L    Alk.  phosphatase 86 50 - 136 U/L    Protein, total 6.6 6.3 - 8.2 g/dL    Albumin 2.4 (L) 3.5 - 5.0 g/dL    Globulin 4.2 (H) 2.3 - 3.5 g/dL    A-G Ratio 0.6 (L) 1.2 - 3.5     GLUCOSE, POC    Collection Time: 01/21/20  7:33 AM   Result Value Ref Range    Glucose (POC) 97 65 - 100 mg/dL     No Known Allergies  Current Facility-Administered Medications   Medication Dose Route Frequency Provider Last Rate Last Dose    insulin lispro (HUMALOG) injection   SubCUTAneous ACB Devan Soria MD   Stopped at 01/18/20 0800    metFORMIN (GLUCOPHAGE) tablet 500 mg  500 mg Oral BID WITH MEALS Devan Soria MD   500 mg at 01/20/20 1709    metoprolol succinate (TOPROL-XL) XL tablet 25 mg  25 mg Oral DAILY Devan Soria MD   25 mg at 01/20/20 0849    polyethylene glycol (MIRALAX) packet 17 g  17 g Oral DAILY PRN Ash TARIQ DO   17 g at 01/09/20 3229    guaiFENesin (ROBITUSSIN) 100 mg/5 mL oral liquid 100 mg  100 mg Oral Q4H PRN Tyrell Oconnell MD   100 mg at 20 2324    hydrALAZINE (APRESOLINE) 20 mg/mL injection 20 mg  20 mg IntraVENous Q4H PRN Ayala Mackey MD   20 mg at 19 0133    atorvastatin (LIPITOR) tablet 80 mg  80 mg Oral QHS Ayala Mackey MD   80 mg at 20 2324    lisinopril (PRINIVIL, ZESTRIL) tablet 40 mg  40 mg Oral DAILY Ayala Mackey MD   40 mg at 20 0848    sodium chloride (NS) flush 5-40 mL  5-40 mL IntraVENous PRN Ayala Mackey MD   5 mL at 01/15/20 1529    ondansetron (ZOFRAN) injection 4 mg  4 mg IntraVENous Q4H PRN Ayala Mackey MD        aspirin chewable tablet 81 mg  81 mg Oral DAILY Ayala Mackey MD   81 mg at 20 0848    acetaminophen (TYLENOL) tablet 650 mg  650 mg Oral Q4H PRN Ayala Mackey MD   650 mg at 20 2324    enoxaparin (LOVENOX) injection 40 mg  40 mg SubCUTAneous Q24H Ayala Mackey MD   40 mg at 20 1416    dextrose 40% (GLUTOSE) oral gel 1 Tube  15 g Oral PRN Ayala Mackey MD        glucagon [facility]) injection 1 mg  1 mg IntraMUSCular PRN Ayala Mackey MD        dextrose (D50W) injection syrg 12.5-25 g  25-50 mL IntraVENous PRN Ayala Mackey MD           Review of Systems negative with exception of pertinent positives noted above  PHYSICAL EXAM     Visit Vitals  /78 (BP 1 Location: Right arm, BP Patient Position: At rest)   Pulse 64   Temp 98.2 °F (36.8 °C)   Resp 18   Ht 5' 6\" (1.676 m)   Wt 95.3 kg (210 lb)   SpO2 98%   BMI 33.89 kg/m²      Temp (24hrs), Av.1 °F (36.7 °C), Min:97.8 °F (36.6 °C), Max:98.2 °F (36.8 °C)    Oxygen Therapy  O2 Sat (%): 98 % (20 0800)  Pulse via Oximetry: 56 beats per minute (20)  O2 Device: Room air (20)  No intake or output data in the 24 hours ending 20   General: No acute distress    Lungs: CTA bilaterally. Resp even and nonlabored  Heart:  S1S2 present without murmurs rubs gallops. RRR. No LE edema  Abdomen: Soft, non tender, non distended. BS present  Extremities: No cyanosis. Left UE/LE flaccid  Neurologic:  A/O X3. Dysarthria noted. Left facial droop. Sensation intact. LUE/LLE flaccid. Results summary of Diagnostic Studies/Procedures copied from within [facility] EMR:        [address] 96 Problems    Diagnosis Date Noted    PFO (patent foramen ovale) 12/17/2019    Arrhythmia 12/15/2019    Hyperlipemia 17/17/4009    Acute embolic stroke ([facility] [address] 67/45/0955    Type 2 diabetes mellitus (HCC)     Hypertension      Plan:    -Acute embolic stroke   Dysphagia   MRI head: with acute infarcts in L cerebellar hemisphere, deep frontoparietal white matter, and R paramedian yariel.  Also noted old lacunar infarcts. ISMAEL: 3 mm PFO, needs to f/u outpatient with Cardiology for evaluation for closure   Cont statin, ASA  PT/OT/Speech     -Hypertension: continue home meds      -Type 2 diabetes mellitus:  Metformin and HISS     -Arrhythmia: had brief wide complex tachycardia early in admission, will need event monitor as outpt per cards    DVT Prophylaxis: lovenox    Difficult placement, case management working on plan. Patient has no medical insurance. Pt can't go home as pt's wife is disable and is not safe at home without supervision as pt needs long term care.       Malinda Gregory MD

## 2020-01-21 NOTE — PROGRESS NOTES
Problem: Mobility Impaired (Adult and Pediatric)  Goal: *Acute Goals and Plan of Care  Description  Acute PT Goals (reviewed & updated 1/17/2020):  STG:  (1.) Mr. Anjel Gil will move from supine to sit and sit to supine , scoot up and down and roll side to side with CONTACT GUARD ASSIST within 3 treatment day(s). (2.) Mr. Anjel Gil will transfer from bed to chair and chair to bed with MODERATE ASSIST x1 using the least restrictive device within 3 treatment day(s). (3.) Mr. Anjel Gil will perform standing static and dynamic balance activities x 10 minutes with MODERATE ASSIST x1 to improve safety within 3 treatment day(s). LTG:  (1.) Mr. Anjel Gil will move from supine to sit and sit to supine , scoot up and down and roll side to side with STAND BY ASSIST within 7 treatment day(s). (2.) Mr. Anjel Gil will transfer from bed to chair and chair to bed with MINIMAL ASSIST using the least restrictive device within 7 treatment day(s). (3.) Mr. Anjel Gil will ambulate with MODERATE ASSIST for 20+ feet with the least restrictive device within 7 treatment day(s). (4.) Mr. Anjel Gil will perform standing static and dynamic balance activities x 20 minutes with MINIMAL ASSIST to improve safety within 7 treatment day(s). (5.) Mr. Anjel Gil will perform bilateral lower extremity exercises x 15 min for HEP with SUPERVISION to improve strength, endurance, and functional mobility within 7 treatment day(s).      PHYSICAL THERAPY: Daily Note and AM 1/21/2020  INPATIENT: PT Visit Days : 2  Payor: MEDICAID PENDING / Plan: [employer] Swanson PENDING / Product Type: Medicaid /       NAME/AGE/GENDER: Dora Neal is a 53 y.o. male   PRIMARY DIAGNOSIS: Acute ischemic stroke ([address] [I63.9]  Cerebrovascular accident (CVA) due to embolism ([address] [Q02.2] Acute embolic stroke ([address] Acute embolic stroke ([address]       ICD-10: Treatment Diagnosis:   · Other lack of cordination (R27.8)  · Difficulty in walking, Not elsewhere classified (R26.2)  · Other abnormalities of gait and mobility (R26.89)  · Unspecified Lack of Coordination (R27.9)  · Hemiplegia and hemiparesis following cerebral infarction affecting   · left non-dominant side (P55.546)   Precaution/Allergies:  Patient has no known allergies. ASSESSMENT:     Mr. Luc Gómez is a 53year old admitted with acute CVA with left hemiparesis and left inattention. 1/21/2020 AM: Pt presents supine in bed and is agreeable to therapy. Pt performed supine to sit with min assist.  Pt with good- sitting balance at edge of bed. Sit <> stand with luke walker RUE, initial Mod A x2 for push to stand, but then once in standing only Min A x2 to maintain. Side steps at edge of bed min/Mod A x2, assist required for progression of  LLE, and visual and verbal cues for sequencing of luke walker and RLE to promote safety and dynamic standing balance control. Pt sat quickly in chair, but stood again with mod assist x 2, stood for about 1 minute and then sat down, cues for hand placement and improved control. Pt was able to perform below LE exercises with assist to complete with L LE. Pt was left sitting up in chair for lunch and nursing notified. Pt is making slow progress toward therapy goals, continues to be very motivated and works hard throughout treatment, requiring with less assist during transfers this morning. Will continue with POC. This section established at most recent assessment   PROBLEM LIST (Impairments causing functional limitations):  1. Decreased Strength  2. Decreased ADL/Functional Activities  3. Decreased Transfer Abilities  4. Decreased Ambulation Ability/Technique  5. Decreased Balance  6. Increased Pain  7. Decreased Activity Tolerance  8. Increased Fatigue  9. Decreased Flexibility/Joint Mobility   INTERVENTIONS PLANNED: (Benefits and precautions of physical therapy have been discussed with the patient.)  1. Balance Exercise  2. Bed Mobility  3. Family Education  4. Gait Training  5.  Home Exercise Program (HEP)  6. Manual Therapy  7. Neuromuscular Re-education/Strengthening  8. Range of Motion (ROM)  9. Therapeutic Activites  10. Therapeutic Exercise/Strengthening  11. Transfer Training     TREATMENT PLAN: Frequency/Duration: 3 times a week for duration of hospital stay  Rehabilitation Potential For Stated Goals: Good     REHAB RECOMMENDATIONS (at time of discharge pending progress):    Placement: It is my opinion, based on this patient's performance to date, that Mr. Krys Grimaldo may benefit from intensive therapy at an [address] [place] [address] after discharge due to a probable need for 24 hour rehab nursing, a probable need for multiple therapy disciplines, and potential to make ongoing and sustainable functional improvement that is of practical value. .  Equipment:    TBD pending progress with therapy. HISTORY:   History of Present Injury/Illness (Reason for Referral):  Per H&P: \"Pt is a [address] y/o smoker with DM, HTN, who presented to ER with L leg and arm weakness, L facial droop, dysarthria. First noted L leg weakness late night 12/11 when he woke up to go to the bathroom. He was normal when he went to bed around 1030. Woke up this morning and had persistent weakness L leg and also now noted in L arm. EMS called. Noted to have slurred speech and L facial droop as well. Code S called in ER around 9am.  MRI with acute infarcts in L cerebellar hemisphere, deep frontoparietal white matter, and R paramedian yariel. Also noted old lacunar infarcts. No large vessel occlusion on CTA, but some stenosis noted. No hx afib, TIA, CVA. No CP, palpitations, SOB. \"  Past Medical History/Comorbidities:   Mr. Krys Grimaldo  has a past medical history of Acute ischemic stroke ([facility] (12/12/2019), Acute pancreatitis (11/19/2014), Cerebrovascular accident (CVA) due to embolism ([address] (12/12/2019), Diabetes ([address] (2002), Diabetes ([address] Diabetes mellitus, and Hypertension.  He also has no past medical history of Arthritis, Asthma, Autoimmune disease ([facility] CAD (coronary artery disease), Cancer ([facility] Chronic kidney disease, COPD, Dementia, Dementia ([facility] Heart failure ([facility] Ill-defined condition, Infectious disease, Liver disease, Other ill-defined conditions(799.89), Psychiatric disorder, PUD (peptic ulcer disease), Seizures ([facility] or Sleep disorder. Mr. Fiorella Urbina  has a past surgical history that includes hx hernia repair and hx orthopaedic. Social History/Living Environment:   Home Environment: Apartment  # Steps to Enter: 12  Rails to Enter: Yes  [employer] : Bilateral  One/Two Story Residence: One story  Living Alone: No  Support Systems: Spouse/Significant Other/Partner  Patient Expects to be Discharged to[de-identified] Unknown  Current DME Used/Available at Home: None  Tub or Shower Type: Tub/Shower combination  Prior Level of Function/Work/Activity:  Independent, lives with wife in 2nd story 1 level apartment. No recent falls. Number of Personal Factors/Comorbidities that affect the Plan of Care: 1-2: MODERATE COMPLEXITY   EXAMINATION:   Most Recent Physical Functioning:   Gross Assessment:                  Posture:     Balance:  Sitting - Static: Good (unsupported)(-)  Sitting - Dynamic: Fair (occasional)  Standing - Static: Fair;Constant support  Standing - Dynamic : Poor;Fair;Constant support Bed Mobility:  Supine to Sit: Minimum assistance  Scooting: Minimum assistance  Wheelchair Mobility:     Transfers:  Sit to Stand: Moderate assistance;Assist x2  Stand to Sit: Minimum assistance;Assist x2  Gait:     Base of Support: Center of gravity altered;Narrowed  Speed/Clotilde: Shuffled;Slow;Delayed  Step Length: Left shortened;Right shortened  Gait Abnormalities: Decreased step clearance; Hemiplegic;Trunk sway increased  Distance (ft): 4 Feet (ft)(side steps to chair)  Assistive Device: Walker luke;Gait belt  Ambulation - Level of Assistance: Minimal assistance; Moderate assistance;Assist x2      Body Structures Involved:  1. Nerves  2. Voice/Speech  3. Bones  4. Joints  5. Muscles Body Functions Affected:  1. Mental  2. Sensory/Pain  3. Neuromusculoskeletal  4. Movement Related Activities and Participation Affected:  1. General Tasks and Demands  2. Mobility  3. Self Care  4. Interpersonal Interactions and Relationships   Number of elements that affect the Plan of Care: 4+: HIGH COMPLEXITY   CLINICAL PRESENTATION:   Presentation: Evolving clinical presentation with changing clinical characteristics: MODERATE COMPLEXITY   CLINICAL DECISION MAKIN [facility] Inpatient Short Form  How much difficulty does the patient currently have. .. Unable A Lot A Little None   1. Turning over in bed (including adjusting bedclothes, sheets and blankets)? [] 1   [] 2   [x] 3   [] 4   2. Sitting down on and standing up from a chair with arms ( e.g., wheelchair, bedside commode, etc.)   [] 1   [x] 2   [] 3   [] 4   3. Moving from lying on back to sitting on the side of the bed? [] 1   [] 2   [x] 3   [] 4   How much help from another person does the patient currently need. .. Total A Lot A Little None   4. Moving to and from a bed to a chair (including a wheelchair)? [] 1   [x] 2   [] 3   [] 4   5. Need to walk in hospital room? [] 1   [x] 2   [] 3   [] 4   6. Climbing 3-5 steps with a railing? [] 1   [x] 2   [] 3   [] 4   © , [facility] of [address] [place] [address], under license to [employer]. All rights reserved      Score:  Initial: 13 Most Recent: 14 (Date:2020)    Interpretation of Tool:  Represents activities that are increasingly more difficult (i.e. Bed mobility, Transfers, Gait). Medical Necessity:     · Patient is expected to demonstrate progress in   · strength, range of motion, balance, coordination, and functional technique  ·  to   · increase independence with all mobility.    · .  Reason for Services/Other Comments:  · Patient continues to require skilled intervention due to   · medical complications and mobility deficits which impact his level of function, safety, and independence as indicated above. · .   Use of outcome tool(s) and clinical judgement create a POC that gives a: Questionable prediction of patient's progress: MODERATE COMPLEXITY        TREATMENT:      Pre-treatment Symptoms/Complaints:  Pleasant and agreeable to therapy. Pain: Initial:   6/10 L side, RN notified. Post Session:  Resting comfortably in bed     Therapeutic Activity: (    15 minutes): Therapeutic activities including Bed transfers, Chair transfers, Ambulation on level ground and sit to stand transfers to improve mobility, strength, balance and coordination. Required moderate   to promote dynamic balance in standing and promote coordination of bilateral, lower extremity(s). Therapeutic Exercise: (  9 minutes):  Exercises per grid below to improve mobility and strength. Required moderate visual, verbal and manual cues to promote proper body posture and promote proper body mechanics. Progressed range and repetitions as indicated.      Date:  1/21/20 Date:   Date:     ACTIVITY/EXERCISE AM PM AM PM AM PM   Ambulation:           Distance  Device  Duration         Seated Heel Raises X 10 B, P-L        Seated Toe Raises X 10 B, P-L        Seated Long Arc Quads X 10 B, AA-L        Seated Marching X 10 B, AA-L        Seated knee squeezes X 15 B, AA-L                 B = bilateral; AA = active assistive; A = active; P = passive              Braces/Orthotics/Lines/Etc:   · O2 Device: Room Air   Treatment/Session Assessment:    · Response to Treatment:  See above  · Interdisciplinary Collaboration:   o Physical Therapy Assistant  o Registered Nurse  o Rehabilitation Attendant  · After treatment position/precautions:   o Up in chair  o Bed alarm/tab alert on  o Bed/Chair-wheels locked  o Call light within reach  o RN notified   · Compliance with Program/Exercises: Compliant all of the time  · Recommendations/Intent for next treatment session: \"Next visit will focus on advancements to more challenging activities and reduction in assistance provided\".     Total Treatment Duration:  PT Patient Time In/Time Out  Time In: 1058  Time Out: [address]

## 2020-01-21 NOTE — PROGRESS NOTES
Neuro checks  LOC 0  LOC Questions 0  LOC commands 0    Motor Right Arm 0  Motor Left Arm 2  Motor Right Leg 0  Motor Left leg 2  Dysarthria 1

## 2020-01-21 NOTE — PROGRESS NOTES
01/21/20 0325   NIH Stroke Scale   Interval Other (comment)  (Neuro checks)   LOC 0   LOC Questions 0   LOC Commands 0   Motor Right Arm 0   Motor Left Arm 2   Motor Right Leg 0   Motor Left Leg 2   Dysarthria 1     Neuro checks

## 2020-01-21 NOTE — PROGRESS NOTES
Problem: Mobility Impaired (Adult and Pediatric)  Goal: *Acute Goals and Plan of Care  Description  Acute PT Goals (reviewed & updated 1/17/2020):  STG:  (1.) Mr. Wendy Fischer will move from supine to sit and sit to supine , scoot up and down and roll side to side with CONTACT GUARD ASSIST within 3 treatment day(s). (2.) Mr. Wendy Fischer will transfer from bed to chair and chair to bed with MODERATE ASSIST x1 using the least restrictive device within 3 treatment day(s). (3.) Mr. Wendy Fischer will perform standing static and dynamic balance activities x 10 minutes with MODERATE ASSIST x1 to improve safety within 3 treatment day(s). LTG:  (1.) Mr. Wendy Fischer will move from supine to sit and sit to supine , scoot up and down and roll side to side with STAND BY ASSIST within 7 treatment day(s). (2.) Mr. Wendy Fischer will transfer from bed to chair and chair to bed with MINIMAL ASSIST using the least restrictive device within 7 treatment day(s). (3.) Mr. Wendy Fischer will ambulate with MODERATE ASSIST for 20+ feet with the least restrictive device within 7 treatment day(s). (4.) Mr. Wendy Fischer will perform standing static and dynamic balance activities x 20 minutes with MINIMAL ASSIST to improve safety within 7 treatment day(s). (5.) Mr. Wendy Fischer will perform bilateral lower extremity exercises x 15 min for HEP with SUPERVISION to improve strength, endurance, and functional mobility within 7 treatment day(s).      PHYSICAL THERAPY: Daily Note and PM 1/21/2020  INPATIENT: PT Visit Days : 2  Payor: MEDICAID PENDING / Plan: [employer] PENDING / Product Type: Medicaid /       NAME/AGE/GENDER: Ebenezer Lima is a 53 y.o. male   PRIMARY DIAGNOSIS: Acute ischemic stroke ([address] [I63.9]  Cerebrovascular accident (CVA) due to embolism ([address] [O31.0] Acute embolic stroke ([address] Acute embolic stroke ([facility] [address]       ICD-10: Treatment Diagnosis:   · Other lack of cordination (R27.8)  · Difficulty in walking, Not elsewhere classified (R26.2)  · Other abnormalities of gait and mobility (R26.89)  · Unspecified Lack of Coordination (R27.9)  · Hemiplegia and hemiparesis following cerebral infarction affecting   · left non-dominant side (P89.771)   Precaution/Allergies:  Patient has no known allergies. ASSESSMENT:     Mr. Romina Orlando is a 53year old admitted with acute CVA with left hemiparesis and left inattention. 1/21/2020 PM:  PT returns this afternoon to assist pt getting back to bed. Pt stood with increased cues for hand placement and shifting wt to R side, mod assist x 2. Pt took a few side steps using luke walker and min/mod assist x 2 back to bed. Pt needed verbal cues for sequencing of luke walker and RLE to promote safety and dynamic standing balance control. Pt returned supine with min assist and was positioned to comfort. Pt is making steady progress with therapy and puts forth great effort. Will continue with POC. This section established at most recent assessment   PROBLEM LIST (Impairments causing functional limitations):  1. Decreased Strength  2. Decreased ADL/Functional Activities  3. Decreased Transfer Abilities  4. Decreased Ambulation Ability/Technique  5. Decreased Balance  6. Increased Pain  7. Decreased Activity Tolerance  8. Increased Fatigue  9. Decreased Flexibility/Joint Mobility   INTERVENTIONS PLANNED: (Benefits and precautions of physical therapy have been discussed with the patient.)  1. Balance Exercise  2. Bed Mobility  3. Family Education  4. Gait Training  5. Home Exercise Program (HEP)  6. Manual Therapy  7. Neuromuscular Re-education/Strengthening  8. Range of Motion (ROM)  9. Therapeutic Activites  10. Therapeutic Exercise/Strengthening  11. Transfer Training     TREATMENT PLAN: Frequency/Duration: 3 times a week for duration of hospital stay  Rehabilitation Potential For Stated Goals: Good     REHAB RECOMMENDATIONS (at time of discharge pending progress):    Placement:   It is my opinion, based on this patient's performance to date, that Mr. Davey Stein may benefit from intensive therapy at an [address] [place] [address] after discharge due to a probable need for 24 hour rehab nursing, a probable need for multiple therapy disciplines, and potential to make ongoing and sustainable functional improvement that is of practical value. .  Equipment:    TBD pending progress with therapy. HISTORY:   History of Present Injury/Illness (Reason for Referral):  Per H&P: \"Pt is a 54 y/o smoker with DM, HTN, who presented to ER with L leg and arm weakness, L facial droop, dysarthria. First noted L leg weakness late night 12/11 when he woke up to go to the bathroom. He was normal when he went to bed around 1030. Woke up this morning and had persistent weakness L leg and also now noted in L arm. EMS called. Noted to have slurred speech and L facial droop as well. Code S called in ER around 9am.  MRI with acute infarcts in L cerebellar hemisphere, deep frontoparietal white matter, and R paramedian yariel. Also noted old lacunar infarcts. No large vessel occlusion on CTA, but some stenosis noted. No hx afib, TIA, CVA. No CP, palpitations, SOB. \"  Past Medical History/Comorbidities:   Mr. Davey Stein  has a past medical history of Acute ischemic stroke ([address] (12/12/2019), Acute pancreatitis (11/19/2014), Cerebrovascular accident (CVA) due to embolism ([address] (12/12/2019), Diabetes ([address] (2002), Diabetes ([address] Diabetes mellitus, and Hypertension. He also has no past medical history of Arthritis, Asthma, Autoimmune disease ([address] CAD (coronary artery disease), Cancer ([address] Chronic kidney disease, COPD, Dementia, Dementia ([address] Heart failure ([address] Ill-defined condition, Infectious disease, Liver disease, Other ill-defined conditions(799.89), Psychiatric disorder, PUD (peptic ulcer disease), Seizures ([address] or Sleep disorder.   Mr. Davey Stein  has a past surgical history that includes hx hernia repair and hx orthopaedic. Social History/Living Environment:   Home Environment: Apartment  # Steps to Enter: 12  Rails to Enter: Yes  [employer] : Bilateral  One/Two Story Residence: One story  Living Alone: No  Support Systems: Spouse/Significant Other/Partner  Patient Expects to be Discharged to[de-identified] Unknown  Current DME Used/Available at Home: None  Tub or Shower Type: Tub/Shower combination  Prior Level of Function/Work/Activity:  Independent, lives with wife in 2nd story 1 level apartment. No recent falls. Number of Personal Factors/Comorbidities that affect the Plan of Care: 1-2: MODERATE COMPLEXITY   EXAMINATION:   Most Recent Physical Functioning:   Gross Assessment:                  Posture:     Balance:  Sitting - Static: Good (unsupported)(-)  Sitting - Dynamic: Fair (occasional)  Standing - Static: Fair;Constant support  Standing - Dynamic : Poor;Fair;Constant support Bed Mobility:  Supine to Sit: Minimum assistance  Scooting: Minimum assistance  Wheelchair Mobility:     Transfers:  Sit to Stand: Moderate assistance;Assist x2  Stand to Sit: Minimum assistance;Assist x2  Gait:     Base of Support: Center of gravity altered;Narrowed  Speed/Clotilde: Shuffled;Slow;Delayed  Step Length: Left shortened;Right shortened  Gait Abnormalities: Decreased step clearance; Hemiplegic;Trunk sway increased  Distance (ft): 4 Feet (ft)(side steps to chair)  Assistive Device: Walker luke;Gait belt  Ambulation - Level of Assistance: Minimal assistance; Moderate assistance;Assist x2      Body Structures Involved:  1. Nerves  2. Voice/Speech  3. Bones  4. Joints  5. Muscles Body Functions Affected:  1. Mental  2. Sensory/Pain  3. Neuromusculoskeletal  4. Movement Related Activities and Participation Affected:  1. General Tasks and Demands  2. Mobility  3. Self Care  4.  Interpersonal Interactions and Relationships   Number of elements that affect the Plan of Care: 4+: HIGH COMPLEXITY   CLINICAL PRESENTATION:   Presentation: Evolving clinical presentation with changing clinical characteristics: MODERATE COMPLEXITY   CLINICAL DECISION MAKIN [facility] Mobility Inpatient Short Form  How much difficulty does the patient currently have. .. Unable A Lot A Little None   1. Turning over in bed (including adjusting bedclothes, sheets and blankets)? [] 1   [] 2   [x] 3   [] 4   2. Sitting down on and standing up from a chair with arms ( e.g., wheelchair, bedside commode, etc.)   [] 1   [x] 2   [] 3   [] 4   3. Moving from lying on back to sitting on the side of the bed? [] 1   [] 2   [x] 3   [] 4   How much help from another person does the patient currently need. .. Total A Lot A Little None   4. Moving to and from a bed to a chair (including a wheelchair)? [] 1   [x] 2   [] 3   [] 4   5. Need to walk in hospital room? [] 1   [x] 2   [] 3   [] 4   6. Climbing 3-5 steps with a railing? [] 1   [x] 2   [] 3   [] 4   © , Trustees of [address] [place] [address], under license to [employer]. All rights reserved      Score:  Initial: 13 Most Recent: 14 (Date:2020)    Interpretation of Tool:  Represents activities that are increasingly more difficult (i.e. Bed mobility, Transfers, Gait). Medical Necessity:     · Patient is expected to demonstrate progress in   · strength, range of motion, balance, coordination, and functional technique  ·  to   · increase independence with all mobility. · .  Reason for Services/Other Comments:  · Patient continues to require skilled intervention due to   · medical complications and mobility deficits which impact his level of function, safety, and independence as indicated above. · .   Use of outcome tool(s) and clinical judgement create a POC that gives a: Questionable prediction of patient's progress: MODERATE COMPLEXITY        TREATMENT:      Pre-treatment Symptoms/Complaints:  Pleasant and agreeable to therapy. Pain: Initial:   6/10 L mely, RN notified.  Post Session: Resting comfortably in bed     Therapeutic Activity: (    10 minutes): Therapeutic activities including Bed transfers, Chair transfers, Ambulation on level ground and sit to stand transfers to improve mobility, strength, balance and coordination. Required moderate   to promote dynamic balance in standing and promote coordination of bilateral, lower extremity(s). Therapeutic Exercise: (  0 minutes):  Exercises per grid below to improve mobility and strength. Required moderate visual, verbal and manual cues to promote proper body posture and promote proper body mechanics. Progressed range and repetitions as indicated. Date:  1/21/20 Date:   Date:     ACTIVITY/EXERCISE AM PM AM PM AM PM   Ambulation:           Distance  Device  Duration         Seated Heel Raises X 10 B, P-L        Seated Toe Raises X 10 B, P-L        Seated Long Arc Quads X 10 B, AA-L        Seated Marching X 10 B, AA-L        Seated knee squeezes X 15 B, AA-L                 B = bilateral; AA = active assistive; A = active; P = passive              Braces/Orthotics/Lines/Etc:   · O2 Device: Room Air   Treatment/Session Assessment:    · Response to Treatment:  See above  · Interdisciplinary Collaboration:   o Physical Therapy Assistant  o Registered Nurse  o Certified Nursing Assistant/Patient Care Technician  · After treatment position/precautions:   o Supine in bed  o Bed alarm/tab alert on  o Bed/Chair-wheels locked  o Bed in low position  o Call light within reach  o RN notified   · Compliance with Program/Exercises: Compliant all of the time  · Recommendations/Intent for next treatment session: \"Next visit will focus on advancements to more challenging activities and reduction in assistance provided\".     Total Treatment Duration:  PT Patient Time In/Time Out  Time In: 1300  Time Out: [address] [facility], \[facility]\""

## 2020-01-21 NOTE — PROGRESS NOTES
01/21/20 0725   NIH Stroke Scale   Interval Other (comment)  (Dual NIH with Mayda Howard RN)   LOC 0   LOC Questions 0   LOC Commands 0   Best Gaze 0   Visual 0   Facial Palsy 1   Motor Right Arm 0   Motor Left Arm 2   Motor Right Leg 0   Motor Left Leg 2   Limb Ataxia 0   Sensory 0   Best Language 1   Dysarthria 1   Extinction and Inattention 0   Total 7     Dual NIH with Hermilo Asencio

## 2020-01-22 LAB
GLUCOSE BLD STRIP.AUTO-MCNC: 105 MG/DL (ref 65–100)
GLUCOSE BLD STRIP.AUTO-MCNC: 118 MG/DL (ref 65–100)

## 2020-01-22 PROCEDURE — 74011250637 HC RX REV CODE- 250/637: Performed by: INTERNAL MEDICINE

## 2020-01-22 PROCEDURE — 82962 GLUCOSE BLOOD TEST: CPT

## 2020-01-22 PROCEDURE — 97535 SELF CARE MNGMENT TRAINING: CPT

## 2020-01-22 PROCEDURE — 97112 NEUROMUSCULAR REEDUCATION: CPT

## 2020-01-22 PROCEDURE — 92507 TX SP LANG VOICE COMM INDIV: CPT

## 2020-01-22 PROCEDURE — 74011250636 HC RX REV CODE- 250/636: Performed by: INTERNAL MEDICINE

## 2020-01-22 PROCEDURE — 97110 THERAPEUTIC EXERCISES: CPT

## 2020-01-22 PROCEDURE — 97168 OT RE-EVAL EST PLAN CARE: CPT

## 2020-01-22 PROCEDURE — 65270000029 HC RM PRIVATE

## 2020-01-22 PROCEDURE — 74011250637 HC RX REV CODE- 250/637: Performed by: HOSPITALIST

## 2020-01-22 RX ADMIN — METOPROLOL SUCCINATE 25 MG: 25 TABLET, FILM COATED, EXTENDED RELEASE ORAL at 08:31

## 2020-01-22 RX ADMIN — METFORMIN HYDROCHLORIDE 500 MG: 500 TABLET, FILM COATED ORAL at 17:12

## 2020-01-22 RX ADMIN — ACETAMINOPHEN 650 MG: 325 TABLET, FILM COATED ORAL at 19:45

## 2020-01-22 RX ADMIN — LISINOPRIL 40 MG: 20 TABLET ORAL at 08:31

## 2020-01-22 RX ADMIN — GUAIFENESIN 100 MG: 100 SOLUTION ORAL at 19:45

## 2020-01-22 RX ADMIN — METFORMIN HYDROCHLORIDE 500 MG: 500 TABLET, FILM COATED ORAL at 08:31

## 2020-01-22 RX ADMIN — ENOXAPARIN SODIUM 40 MG: 40 INJECTION SUBCUTANEOUS at 14:35

## 2020-01-22 RX ADMIN — ATORVASTATIN CALCIUM 80 MG: 80 TABLET, FILM COATED ORAL at 21:18

## 2020-01-22 RX ADMIN — ASPIRIN 81 MG 81 MG: 81 TABLET ORAL at 08:31

## 2020-01-22 NOTE — PROGRESS NOTES
Progress Note    2020  Admit Date: 2019  9:07 AM   NAME: Carlos Strauss   :  1975   MRN:  [id number]   Attending: Roberto Munoz MD  PCP:  Irvin Pang MD  Treatment Team: Attending Provider: Roberto Munoz MD; Care Manager: Royal Benitez, RN; Care Manager: Nicole Telles, LM; Consulting Provider: Nehal Klein MD; Utilization Review: Celestina Garcia RN; Nurse Practitioner: Madelin Serrano NP; Charge Nurse: Omaira Bell; Occupational Therapist: Wolf Rodriguez OT    Full Code     Mr. Ana Olson is a 52 yo male with PMH of DM II, HTN who presented 19 with c/o left leg and arm weakness, left facial droop and dysarthria. Code S called. MRI with acute infarctions in left cerebellar hemisphere, deep frontoparietal white matter and right paramedian yariel. Also noted to have old lacunar infarcts. CTA without large vessel occlusions, however some stenosis noted. Echo showed PFO, Cardiology to f/u outpatient for evaluation for closure. Neurology consulted. Started on ASA, statin. Pt is uninsured, difficult placement, case management working on plan. SUBJECTIVE:   Pt doing well this morning. Still has Weakness in the left upper and lower extremities.      Past Medical History:   Diagnosis Date    Acute ischemic stroke ([address] 2019    Acute pancreatitis 2014    Cerebrovascular accident (CVA) due to embolism ([address] 2019    Diabetes ([address] 2002    Diabetes ([address]     Diabetes mellitus     Hypertension      Recent Results (from the past 24 hour(s))   GLUCOSE, POC    Collection Time: 20  7:22 AM   Result Value Ref Range    Glucose (POC) 105 (H) 65 - 100 mg/dL   GLUCOSE, POC    Collection Time: 20 11:44 AM   Result Value Ref Range    Glucose (POC) 118 (H) 65 - 100 mg/dL     No Known Allergies  Current Facility-Administered Medications   Medication Dose Route Frequency Provider Last Rate Last Dose    metFORMIN (GLUCOPHAGE) tablet 500 mg  500 mg Oral BID WITH MEALS Jalil Dawkins MD   500 mg at 20 0831    metoprolol succinate (TOPROL-XL) XL tablet 25 mg  25 mg Oral DAILY Jalil Dawkins MD   25 mg at 20 0831    polyethylene glycol (MIRALAX) packet 17 g  17 g Oral DAILY PRN Zhane Mendes    17 g at 20 8792    guaiFENesin (ROBITUSSIN) 100 mg/5 mL oral liquid 100 mg  100 mg Oral Q4H PRN Jalil Dawkins MD   100 mg at 20 1925    hydrALAZINE (APRESOLINE) 20 mg/mL injection 20 mg  20 mg IntraVENous Q4H PRN Teodora Estes MD   20 mg at 19 0133    atorvastatin (LIPITOR) tablet 80 mg  80 mg Oral QHS Teodora Estes MD   80 mg at 20 2103    lisinopril (PRINIVIL, ZESTRIL) tablet 40 mg  40 mg Oral DAILY Teodora Estes MD   40 mg at 20 0831    sodium chloride (NS) flush 5-40 mL  5-40 mL IntraVENous PRN Teodora Estes MD   5 mL at 01/15/20 1529    ondansetron [facility]) injection 4 mg  4 mg IntraVENous Q4H PRN Teodora Estes MD        aspirin chewable tablet 81 mg  81 mg Oral DAILY Teodora Estes MD   81 mg at 20 0831    acetaminophen (TYLENOL) tablet 650 mg  650 mg Oral Q4H PRN Teodora Estes MD   650 mg at 20 1925    enoxaparin (LOVENOX) injection 40 mg  40 mg SubCUTAneous Q24H Teodora Estes MD   40 mg at 20 1435    dextrose 40% (GLUTOSE) oral gel 1 Tube  15 g Oral PRN Teodora Estes MD        glucagon [facility]) injection 1 mg  1 mg IntraMUSCular PRN Teodora Estes MD        dextrose (D50W) injection syrg 12.5-25 g  25-50 mL IntraVENous PRN Teodora Estes MD           Review of Systems negative with exception of pertinent positives noted above  PHYSICAL EXAM     Visit Vitals  /74 (BP 1 Location: Right arm, BP Patient Position: At rest)   Pulse 66   Temp 98.1 °F (36.7 °C)   Resp 17   Ht 5' 6\" (1.676 m)   Wt 95.3 kg (210 lb)   SpO2 95%   BMI 33.89 kg/m²      Temp (24hrs), Av.8 °F (36.6 °C), Min:97.4 °F (36.3 °C), Max:98.1 °F (36.7 °C)    Oxygen Therapy  O2 Sat (%): 95 % (01/22/20 1600)  Pulse via Oximetry: 56 beats per minute (01/05/20 1918)  O2 Device: Room air (01/05/20 1930)  No intake or output data in the 24 hours ending 01/22/20 1709   General: No acute distress    Lungs: CTA bilaterally. Resp even and nonlabored  Heart:  S1S2 present without murmurs rubs gallops. RRR. No LE edema  Abdomen: Soft, non tender, non distended. BS present  Extremities: No cyanosis. Left UE/LE flaccid  Neurologic:  A/O X3. Dysarthria noted. Left facial droop. Sensation intact. LUE/LLE flaccid. Results summary of Diagnostic Studies/Procedures copied from within [facility] EMR:        Edmar Dixon 96 Problems    Diagnosis Date Noted    PFO (patent foramen ovale) 12/17/2019    Arrhythmia 12/15/2019    Hyperlipemia 22/16/0724    Acute embolic stroke ([facility] 05/96/3907    Type 2 diabetes mellitus (HCC)     Hypertension      Plan:    -Acute embolic stroke: POA  Dysphagia   MRI head: with acute infarcts in L cerebellar hemisphere, deep frontoparietal white matter, and R paramedian yariel.  Also noted old lacunar infarcts. ISMAEL: 3 mm PFO, needs to f/u outpatient with Cardiology for evaluation for closure   Cont statin, ASA  PT/OT/Speech     -Hypertension: continue home meds      -Type 2 diabetes mellitus:  Metformin and HISS     -Arrhythmia: had brief wide complex tachycardia early in admission, will need event monitor as outpt per cards    DVT Prophylaxis: lovenox    Difficult placement, case management working on plan. Patient has no medical insurance. Pt can't go home as pt's wife is disable and is not safe at home without supervision as pt needs long term care.       Tom Herrera MD

## 2020-01-22 NOTE — PROGRESS NOTES
Problem: Self Care Deficits Care Plan (Adult)  Goal: *Acute Goals and Plan of Care (Insert Text)  Description  GOALS UPDATED 1/22/2020:   1. Patient will complete dynamic sitting balance for ADLs with supervision. 2. Patient will demonstrate appropriate safety awareness and protection of L UE during bed mobility and functional transfers with minimal cues. 3. Patient will complete total body bathing and dressing with moderate assistance and adaptive equipment as needed. 4. Patient will complete weightbearing into the L UE with ADL tasks with minimal assistance to improve ability to use as a functional assist during ADL tasks. 5. Patient will demonstrate modified independence with therapeutic exercises to improve strength in L UE for ADLs/functional transfers. 6. Patient will complete bed mobility with stand by assistance and adaptive equipment as needed in preparation for functional transfers. 7. Patient will complete functional transfers with contact guard assistance and adaptive equipment as needed. 8. Patient will complete functional mobility for ADLs with moderate assistance and adaptive equipment as needed.      Timeframe: 7 visits       Outcome: Progressing Towards Goal     OCCUPATIONAL THERAPY: Daily Note, Re-evaluation and AM    1/22/2020  INPATIENT: OT Visit Days: 1  Payor: MEDICAID PENDING / Plan: Corey Olguin PENDING / Product Type: Medicaid /      NAME/AGE/GENDER: Shahana Mcdaniel is a 53 y.o. male   PRIMARY DIAGNOSIS:  Acute ischemic stroke ([facility] [I63.9]  Cerebrovascular accident (CVA) due to embolism ([facility] [facility] [J41.6] Acute embolic stroke ([facility] [facility] Acute embolic stroke ([facility] [address]       ICD-10: Treatment Diagnosis:    · Generalized Muscle Weakness (M62.81)  · Other lack of cordination (R27.8)  · Hemiplegia and hemiparesis following cerebral infarction affecting   · left non-dominant side (I69.354)  · Abnormal posture (R29.3)   Precautions/Allergies:    NO PULLING ON LUE   LUE in sling with shoulder joint approximated and supported   Patient has no known allergies. ASSESSMENT:     Mr. Osmar Davey is a 53 y.o. male presents to the hospital with L sided weakness and acute ischemic CVA. MRI revealed acute to subacute L cerebellar and R paramedian yariel infarcts. At baseline pt lives with his wife and is independent. One finger wide subluxation noted in L shoulder (1/22/2020).    1/22/2020: Upon arrival pt alert and agreeable to OT re-evaluation and treatment. Speech noted to be garbled. LUE in sling, sling not in appropriate position, L shoulder not appropriately supported. One finger wide subluxation noted in L shoulder. Sling adjusted to fit appropriately. BUE assessment revealed RUE AROM WFL and strength generally decreased but functional. Light touch sensation intact in BUEs. LUE AROM and strength grossly decreased, non-functional, see MMT below. LUE PROM generally decreased, limited to ~90* shoulder flexion due to pain. Pt completed bed mobility with ModA/cueing for technique, impaired dynamic sitting balance with losses of balance requiring ModA to correct. Pt practiced scooting to edge of bed with ModA/cueing for technique/weight shifts, and posture/neck extension. Pt practiced functional transfers with ModAx2/cueing, took steps to chair with MaxAx2 and luke RW in RUE. Heavy multimodal cueing for weight shifts, RW management, and advancing BLEs. Pt sat with MinAx2. Pt practiced sling management with Total assist, applied lotion to LUE with Jody to support L shoulder, total assist for RUE. Pt practiced LUE exercises with cueing, see below. Pt left seated in chair with call bell within reach. Pt presents with deficits in strength, activity tolerance, balance, ambulation and transfers, L sided weakness. Pt is demonstrating good progress towards goals, goals updated to better reflect patient's current level of function. See above.      RN notified of L shoulder subluxation, educated on proper sling management and to approximate and support L shoulder joint. Requested order for OT to apply KT tape to L shoulder to help provide L shoulder support and address subluxation. This patient is appropriate for co-treatment at this time due to multiple deficits including decreased balance, L hemiplegia, decreased endurance, decreased strength, and need for high level assistance to complete functional transfers and functional tasks. This section established at most recent assessment   PROBLEM LIST (Impairments causing functional limitations):  1. Decreased Strength  2. Decreased ADL/Functional Activities  3. Decreased Transfer Abilities  4. Decreased Ambulation Ability/Technique  5. Decreased Balance  6. Decreased Activity Tolerance  7. Increased Fatigue  8. Decreased Flexibility/Joint Mobility  9. Decreased Knowledge of Precautions  10. Decreased [place] with Home Exercise Program   INTERVENTIONS PLANNED: (Benefits and precautions of occupational therapy have been discussed with the patient.)  1. Activities of daily living training  2. Adaptive equipment training  3. Balance training  4. Clothing management  5. Cognitive training  6. Donning&doffing training  7. Keanu tech training  8. Neuromuscular re-eduation  9. Therapeutic activity  10. Therapeutic exercise     TREATMENT PLAN: Frequency/Duration: Follow patient 3 times per week to address above goals. Rehabilitation Potential For Stated Goals: Excellent     REHAB RECOMMENDATIONS (at time of discharge pending progress):    Placement: It is my opinion, based on this patient's performance to date, that Mr. Kathleen Jensen may benefit from intensive therapy at an [address] [place] [address] after discharge due to potential to make ongoing and sustainable functional improvement that is of practical value. Nick Dumas Pt functioning far below independent baseline, demonstrating good improvement and participation.  Pt would likely benefit greatly and increase independence from inpatient rehab stay. Equipment:    TBD               OCCUPATIONAL PROFILE AND HISTORY:   History of Present Injury/Illness (Reason for Referral):  See H&P  Past Medical History/Comorbidities:   Mr. Jonathon Smith  has a past medical history of Acute ischemic stroke ([address] (12/12/2019), Acute pancreatitis (11/19/2014), Cerebrovascular accident (CVA) due to embolism ([address] (12/12/2019), Diabetes ([address] (2002), Diabetes ([address] Diabetes mellitus, and Hypertension. He also has no past medical history of Arthritis, Asthma, Autoimmune disease ([address] CAD (coronary artery disease), Cancer ([address] Chronic kidney disease, COPD, Dementia, Dementia ([address] Heart failure ([address] Ill-defined condition, Infectious disease, Liver disease, Other ill-defined conditions(799.89), Psychiatric disorder, PUD (peptic ulcer disease), Seizures ([address] or Sleep disorder. Mr. Jonathon Smith  has a past surgical history that includes hx hernia repair and hx orthopaedic. Social History/Living Environment:   Home Environment: Apartment  # Steps to Enter: 12  Rails to Enter: Yes  [employer] : Bilateral  One/Two Story Residence: One story  Living Alone: No  Support Systems: Spouse/Significant Other/Partner  Patient Expects to be Discharged to[de-identified] Unknown  Current DME Used/Available at Home: None  Tub or Shower Type: Tub/Shower combination  Prior Level of Function/Work/Activity:  Pt lives at home with his wife. Pt is typically independent with ADL/functional mobility. Pt does not drive. Pt was working part-time at [employer]. Personal Factors:          Age:  53 y.o.         Past/Current Experience:  CVA with flaccid L side        Other factors that influence how disability is experienced by the patient:  multiple co-morbidities    Number of Personal Factors/Comorbidities that affect the Plan of Care: Extensive review of physical, cognitive, and psychosocial performance (3+):  HIGH COMPLEXITY   ASSESSMENT OF OCCUPATIONAL PERFORMANCE[de-identified]   Activities of Daily Living:   Basic ADLs (From Assessment) Complex ADLs (From Assessment)   Feeding: Setup  Oral Facial Hygiene/Grooming: Moderate assistance  Bathing: Maximum assistance  Upper Body Dressing: Maximum assistance  Lower Body Dressing: Total assistance  Toileting: Maximum assistance Instrumental ADL  Meal Preparation: Total assistance  Homemaking: Total assistance   Grooming/Bathing/Dressing Activities of Daily Living     Cognitive Retraining  Safety/Judgement: Awareness of environment; Fall prevention; Insight into deficits                       Bed/Mat Mobility  Supine to Sit: Moderate assistance; Additional time  Sit to Stand: Minimum assistance; Moderate assistance;Assist x2  Stand to Sit: Minimum assistance;Assist x2  Bed to Chair: Maximum assistance;Assist x2  Scooting: Moderate assistance     Most Recent Physical Functioning:   Gross Assessment:  AROM: Grossly decreased, non-functional(LUE flaccid )  PROM: Generally decreased, functional(LUE proximally, shoulder flexion to ~90*)  Strength: Grossly decreased, non-functional(LUE)  Coordination: Grossly decreased, non-functional(LUE)  Tone: Abnormal(LUE)  Sensation: Intact(BUEs to light touch)            LUE Strength  L Scapular Elevation: 0  L Scapular Retraction: 0  L Shoulder Flexion: 0  L Elbow Flexion: 0  L Wrist Extension: 2-  Posture:     Balance:  Sitting: Impaired  Sitting - Static: Good (unsupported); Fair (occasional)  Sitting - Dynamic: Fair (occasional)  Standing: Impaired  Standing - Static: Fair;Poor;Constant support  Standing - Dynamic : Poor;Constant support Bed Mobility:  Supine to Sit: Moderate assistance; Additional time  Scooting: Moderate assistance  Wheelchair Mobility:     Transfers:  Sit to Stand: Minimum assistance; Moderate assistance;Assist x2  Stand to Sit: Minimum assistance;Assist x2  Bed to Chair: Maximum assistance;Assist x2            Patient Vitals for the past 6 hrs:   BP BP Patient Position SpO2 Pulse   01/22/20 1200 93/70 Sitting 97 % 86 Mental Status  Neurologic State: Alert  Orientation Level: Oriented X4  Cognition: Appropriate for age attention/concentration, Follows commands  Perception: Cues to maintain midline in standing  Perseveration: No perseveration noted  Safety/Judgement: Awareness of environment, Fall prevention, Insight into deficits    LUE Strength  L Scapular Elevation: 0  L Scapular Retraction: 0  L Shoulder Flexion: 0  L Elbow Flexion: 0  L Wrist Extension: 2-                     Physical Skills Involved:  1. Range of Motion  2. Balance  3. Strength  4. Activity Tolerance  5. Fine Motor Control  6. Gross Motor Control Cognitive Skills Affected (resulting in the inability to perform in a timely and safe manner):  1. Perception  2. Expression Psychosocial Skills Affected:  1. Habits/Routines  2. Environmental Adaptation  3. Social Interaction  4. Emotional Regulation  5. Self-Awareness  6. Awareness of Others  7. Social Roles   Number of elements that affect the Plan of Care: 5+:  HIGH COMPLEXITY   CLINICAL DECISION MAKIN[address] [place] [id number] AM-PAC 6 Clicks   Daily Activity Inpatient Short Form  How much help from another person does the patient currently need. .. Total A Lot A Little None   1. Putting on and taking off regular lower body clothing? [x] 1   [] 2   [] 3   [] 4   2. Bathing (including washing, rinsing, drying)? [] 1   [x] 2   [] 3   [] 4   3. Toileting, which includes using toilet, bedpan or urinal?   [] 1   [x] 2   [] 3   [] 4   4. Putting on and taking off regular upper body clothing? [] 1   [x] 2   [] 3   [] 4   5. Taking care of personal grooming such as brushing teeth? [] 1   [x] 2   [] 3   [] 4   6. Eating meals? [] 1   [] 2   [x] 3   [] 4   © , Trustees of [address] [place] [id number], under license to [employer].  All rights reserved      Score:  Initial: 11 Most Recent: 12 (Date: 2020 )    Interpretation of Tool:  Represents activities that are increasingly more difficult (i.e. Bed mobility, Transfers, Gait). Medical Necessity:     · Patient demonstrates   · good and excellent  ·  rehab potential due to higher previous functional level. Reason for Services/Other Comments:  · Patient continues to require skilled intervention due to   · Decreased independence with ADL/functional transfers that impacts overall quality of life. · .   Use of outcome tool(s) and clinical judgement create a POC that gives a: MODERATE COMPLEXITY         TREATMENT:   (In addition to Assessment/Re-Assessment sessions the following treatments were rendered)     Pre-treatment Symptoms/Complaints:    Pain: Initial:   Pain Intensity 1: 0  Post Session:  0     Self Care: (8 minutes): Procedure(s) (per grid) utilized to improve and/or restore self-care/home management as related to dressing and bathing. Required maximal verbal and manual cueing to facilitate activities of daily living skills and compensatory activities. Pt practiced sling management with Total assist, applied lotion to LUE with Jody to support L shoulder, total assist for RUE. Therapeutic Exercise: (  15 minutes):  Exercises per grid below to improve mobility, strength, coordination and dynamic movement of hand - left and wrist - left to improve functional motion and strength. Required moderate visual, verbal, manual and tactile cues to promote proper body alignment and promote proper body mechanics. Progressed repetitions as indicated. Pt practiced LUE exercises with cueing, see below. Neuromuscular Re-education: ( 15 minutes):  Exercise/activities per grid below to improve balance, coordination, posture and proprioception. Required maximal visual, verbal, manual and tactile cues to promote static and dynamic balance in standing and promote coordination of bilateral, upper extremity(s), lower extremity(s). Pt practiced scooting to edge of bed with ModA/cueing for technique/weight shifts.  Pt practiced functional transfers with ModAx2/cueing, took steps to chair with MaxAx2 and luke RW in RUE. Heavy multimodal cueing for weight shifts, RW management, and advancing BLEs. Pt sat with MinAx2. Date:  1/22/2020 Date:   Date:     Activity/Exercise Parameters Parameters Parameters   LUE finger flexion, AROM gravity minimized then AAROM to complete ROM 2x10 reps     LUE finger extension AAROM 2x10 reps     LUE wrist extension, AROM gravity minimized then AAROM to complete ROM 1x10 reps     LUE wrist extension, AROM against gravity  1x10 reps     LUE wrist flexion AAROM 2x10 reps     LUE elbow flexion/extension PROM x10 reps     LUE shoulder flexion PROM x10 reps                            All PROM in LUE Date:  1/8/2020 Date:  1/15/2020 Date:     Activity/Exercise Parameters Parameters Parameters   Shoulder flexion 10 reps 15 reps    Shoulder int/ext rotation 10 reps 15 reps    Shoulder horizontal add/abd 10 reps 15 reps    Elbow flexion/extension 10 reps 15 reps                           Date:  12/15 Date:   Date:     Activity/Exercise Parameters Parameters Parameters   Shifting weight side to side and weightbearing into L UE 10 reps      Trunk flexion   5 reps      Trunk extension to upright posture 5 reps      Dynamic reaching to the L 15 reps      Identifying and visually attending to L side 6 reps                      Braces/Orthotics/Lines/Etc:   · LUE sling   · O2 device: Room air  · Treatment/Session Assessment:    · Response to Treatment:  Pt demonstrated good participation. · Interdisciplinary Collaboration:   o Occupational Therapist  o Registered Nurse  o Physician  o Rehabilitation Attendant  · After treatment position/precautions:   o Up in chair  o Bed alarm/tab alert on  o Bed/Chair-wheels locked  o Call light within reach  o RN notified   · Compliance with Program/Exercises: Compliant all of the time, Will assess as treatment progresses. · Recommendations/Intent for next treatment session:   \"Next visit will focus on advancements to more challenging activities and reduction in assistance provided\".   Total Treatment Duration:  OT Patient Time In/Time Out  Time In: 1050  Time Out: 6629 ADEN [place]

## 2020-01-22 NOTE — PROGRESS NOTES
Problem: Self Care Deficits Care Plan (Adult)  Goal: *Acute Goals and Plan of Care (Insert Text)  Description  GOALS UPDATED 1/22/2020:   1. Patient will complete dynamic sitting balance for ADLs with supervision. 2. Patient will demonstrate appropriate safety awareness and protection of L UE during bed mobility and functional transfers with minimal cues. 3. Patient will complete total body bathing and dressing with moderate assistance and adaptive equipment as needed. 4. Patient will complete weightbearing into the L UE with ADL tasks with minimal assistance to improve ability to use as a functional assist during ADL tasks. 5. Patient will demonstrate modified independence with therapeutic exercises to improve strength in L UE for ADLs/functional transfers. 6. Patient will complete bed mobility with stand by assistance and adaptive equipment as needed in preparation for functional transfers. 7. Patient will complete functional transfers with contact guard assistance and adaptive equipment as needed. 8. Patient will complete functional mobility for ADLs with moderate assistance and adaptive equipment as needed.      Timeframe: 7 visits       Outcome: Progressing Towards Goal     OCCUPATIONAL THERAPY: Daily Note, Re-evaluation and AM    1/22/2020  INPATIENT: OT Visit Days: 2  Payor: MEDICAID PENDING / Plan: [employer] PENDING / Product Type: Medicaid /      NAME/AGE/GENDER: Scarlet Tellez is a 53 y.o. male   PRIMARY DIAGNOSIS:  Acute ischemic stroke ([facility] [I63.9]  Cerebrovascular accident (CVA) due to embolism ([facility] [P86.6] Acute embolic stroke ([facility] Acute embolic stroke ([facility]       ICD-10: Treatment Diagnosis:    · Generalized Muscle Weakness (M62.81)  · Other lack of cordination (R27.8)  · Hemiplegia and hemiparesis following cerebral infarction affecting   · left non-dominant side (I69.354)  · Abnormal posture (R29.3)   Precautions/Allergies:    NO PULLING ON LUE   LUE in sling with shoulder joint approximated and supported   Patient has no known allergies. ASSESSMENT:     Mr. Breanna Colbert is a 53 y.o. male presents to the hospital with L sided weakness and acute ischemic CVA. MRI revealed acute to subacute L cerebellar and R paramedian yariel infarcts. At baseline pt lives with his wife and is independent. One finger wide subluxation noted in L shoulder (1/22/2020).    1/22/2020: KT Order received for support of shoulder. I strip applied to L upper trapezius mm for support, X stirp to L rhomboid mm for support, and I strip to wrap around shoulder for support. Educated patient's wife and therapist on precautions on KT wear. Caregiver and therapist seemed to understand education provided. Cont OT per tx plan. This patient is appropriate for co-treatment at this time due to multiple deficits including decreased balance, L hemiplegia, decreased endurance, decreased strength, and need for high level assistance to complete functional transfers and functional tasks. This section established at most recent assessment   PROBLEM LIST (Impairments causing functional limitations):  1. Decreased Strength  2. Decreased ADL/Functional Activities  3. Decreased Transfer Abilities  4. Decreased Ambulation Ability/Technique  5. Decreased Balance  6. Decreased Activity Tolerance  7. Increased Fatigue  8. Decreased Flexibility/Joint Mobility  9. Decreased Knowledge of Precautions  10. Decreased [place] with Home Exercise Program   INTERVENTIONS PLANNED: (Benefits and precautions of occupational therapy have been discussed with the patient.)  1. Activities of daily living training  2. Adaptive equipment training  3. Balance training  4. Clothing management  5. Cognitive training  6. Donning&doffing training  7. Keanu tech training  8. Neuromuscular re-eduation  9. Therapeutic activity  10.  Therapeutic exercise     TREATMENT PLAN: Frequency/Duration: Follow patient 3 times per week to address above goals.  Rehabilitation Potential For Stated Goals: Excellent     REHAB RECOMMENDATIONS (at time of discharge pending progress):    Placement: It is my opinion, based on this patient's performance to date, that Mr. Antony Murray may benefit from intensive therapy at an [address] [place] [address] after discharge due to potential to make ongoing and sustainable functional improvement that is of practical value. Manvel Hind Pt functioning far below independent baseline, demonstrating good improvement and participation. Pt would likely benefit greatly and increase independence from inpatient rehab stay. Equipment:    TBD               OCCUPATIONAL PROFILE AND HISTORY:   History of Present Injury/Illness (Reason for Referral):  See H&P  Past Medical History/Comorbidities:   Mr. Antony Murray  has a past medical history of Acute ischemic stroke ([address] (12/12/2019), Acute pancreatitis (11/19/2014), Cerebrovascular accident (CVA) due to embolism ([address] (12/12/2019), Diabetes ([address] (2002), Diabetes ([address] Diabetes mellitus, and Hypertension. He also has no past medical history of Arthritis, Asthma, Autoimmune disease ([address] CAD (coronary artery disease), Cancer ([address] Chronic kidney disease, COPD, Dementia, Dementia ([address] Heart failure ([address] Ill-defined condition, Infectious disease, Liver disease, Other ill-defined conditions(799.89), Psychiatric disorder, PUD (peptic ulcer disease), Seizures ([address] or Sleep disorder. Mr. Antony Murray  has a past surgical history that includes hx hernia repair and hx orthopaedic.   Social History/Living Environment:   Home Environment: Apartment  # Steps to Enter: 12  Rails to Enter: Yes  [employer] : Bilateral  One/Two Story Residence: One story  Living Alone: No  Support Systems: Spouse/Significant Other/Partner  Patient Expects to be Discharged to[de-identified] Unknown  Current DME Used/Available at Home: None  Tub or Shower Type: Tub/Shower combination  Prior Level of Function/Work/Activity:  Pt lives at home with his wife. Pt is typically independent with ADL/functional mobility. Pt does not drive. Pt was working part-time at [employer]. Personal Factors:          Age:  53 y.o. Past/Current Experience:  CVA with flaccid L side        Other factors that influence how disability is experienced by the patient:  multiple co-morbidities    Number of Personal Factors/Comorbidities that affect the Plan of Care: Extensive review of physical, cognitive, and psychosocial performance (3+):  HIGH COMPLEXITY   ASSESSMENT OF OCCUPATIONAL PERFORMANCE[de-identified]   Activities of Daily Living:   Basic ADLs (From Assessment) Complex ADLs (From Assessment)   Feeding: Setup  Oral Facial Hygiene/Grooming: Moderate assistance  Bathing: Maximum assistance  Upper Body Dressing: Maximum assistance  Lower Body Dressing: Total assistance  Toileting: Maximum assistance Instrumental ADL  Meal Preparation: Total assistance  Homemaking: Total assistance   Grooming/Bathing/Dressing Activities of Daily Living     Cognitive Retraining  Safety/Judgement: Awareness of environment; Fall prevention; Insight into deficits                       Bed/Mat Mobility  Supine to Sit: Moderate assistance; Additional time  Sit to Stand: Minimum assistance; Moderate assistance;Assist x2  Stand to Sit: Minimum assistance;Assist x2  Bed to Chair: Maximum assistance;Assist x2  Scooting: Moderate assistance     Most Recent Physical Functioning:   Gross Assessment:                  Posture:     Balance:  Sitting: Impaired  Sitting - Static: Good (unsupported); Fair (occasional)  Sitting - Dynamic: Fair (occasional)  Standing: Impaired  Standing - Static: Fair;Poor;Constant support  Standing - Dynamic : Poor;Constant support Bed Mobility:  Supine to Sit: Moderate assistance; Additional time  Scooting: Moderate assistance  Wheelchair Mobility:     Transfers:  Sit to Stand: Minimum assistance; Moderate assistance;Assist x2  Stand to Sit: Minimum assistance;Assist x2  Bed to Chair: Maximum assistance;Assist x2            Patient Vitals for the past 6 hrs:   BP BP Patient Position SpO2 Pulse   20 1200 93/70 Sitting 97 % 86       Mental Status  Neurologic State: Alert  Orientation Level: Oriented X4  Cognition: Appropriate for age attention/concentration, Follows commands  Perception: Cues to maintain midline in standing  Perseveration: No perseveration noted  Safety/Judgement: Awareness of environment, Fall prevention, Insight into deficits    LUE Strength  L Scapular Elevation: 0  L Scapular Retraction: 0  L Shoulder Flexion: 0  L Elbow Flexion: 0  L Wrist Extension: 2-                     Physical Skills Involved:  1. Range of Motion  2. Balance  3. Strength  4. Activity Tolerance  5. Fine Motor Control  6. Gross Motor Control Cognitive Skills Affected (resulting in the inability to perform in a timely and safe manner):  1. Perception  2. Expression Psychosocial Skills Affected:  1. Habits/Routines  2. Environmental Adaptation  3. Social Interaction  4. Emotional Regulation  5. Self-Awareness  6. Awareness of Others  7. Social Roles   Number of elements that affect the Plan of Care: 5+:  HIGH COMPLEXITY   CLINICAL DECISION MAKIN[address] [place] [id number] AM-PAC 6 Clicks   Daily Activity Inpatient Short Form  How much help from another person does the patient currently need. .. Total A Lot A Little None   1. Putting on and taking off regular lower body clothing? [x] 1   [] 2   [] 3   [] 4   2. Bathing (including washing, rinsing, drying)? [] 1   [x] 2   [] 3   [] 4   3. Toileting, which includes using toilet, bedpan or urinal?   [] 1   [x] 2   [] 3   [] 4   4. Putting on and taking off regular upper body clothing? [] 1   [x] 2   [] 3   [] 4   5. Taking care of personal grooming such as brushing teeth? [] 1   [x] 2   [] 3   [] 4   6. Eating meals? [] 1   [] 2   [x] 3   [] 4   © , Trustees of [address] [place] [address] [address] [id number], under license to [employer].  All rights reserved      Score: Initial: 11 Most Recent: 12 (Date: 1/22/2020 )    Interpretation of Tool:  Represents activities that are increasingly more difficult (i.e. Bed mobility, Transfers, Gait). Medical Necessity:     · Patient demonstrates   · good and excellent  ·  rehab potential due to higher previous functional level. Reason for Services/Other Comments:  · Patient continues to require skilled intervention due to   · Decreased independence with ADL/functional transfers that impacts overall quality of life. · .   Use of outcome tool(s) and clinical judgement create a POC that gives a: MODERATE COMPLEXITY         TREATMENT:   (In addition to Assessment/Re-Assessment sessions the following treatments were rendered)     Pre-treatment Symptoms/Complaints:    Pain: Initial:      Post Session:  0     Neuromuscular Re-education: ( 25 minutes minutes):  Exercise/activities per grid below to improve balance, coordination, posture and proprioception. Required maximal visual, verbal, manual and tactile cues to promote static and dynamic balance in standing and promote coordination of bilateral, upper extremity(s), lower extremity(s). Pt practiced scooting to edge of bed with ModA/cueing for technique/weight shifts. Pt practiced functional transfers with ModAx2/cueing. KT applied to L shoulder area as noted above for support.       Date:  1/22/2020 Date:   Date:     Activity/Exercise Parameters Parameters Parameters   LUE finger flexion, AROM gravity minimized then AAROM to complete ROM 2x10 reps     LUE finger extension AAROM 2x10 reps     LUE wrist extension, AROM gravity minimized then AAROM to complete ROM 1x10 reps     LUE wrist extension, AROM against gravity  1x10 reps     LUE wrist flexion AAROM 2x10 reps     LUE elbow flexion/extension PROM x10 reps     LUE shoulder flexion PROM x10 reps                            All PROM in LUE Date:  1/8/2020 Date:  1/15/2020 Date:     Activity/Exercise Parameters Parameters Parameters Shoulder flexion 10 reps 15 reps    Shoulder int/ext rotation 10 reps 15 reps    Shoulder horizontal add/abd 10 reps 15 reps    Elbow flexion/extension 10 reps 15 reps                           Date:  12/15 Date:   Date:     Activity/Exercise Parameters Parameters Parameters   Shifting weight side to side and weightbearing into L UE 10 reps      Trunk flexion   5 reps      Trunk extension to upright posture 5 reps      Dynamic reaching to the L 15 reps      Identifying and visually attending to L side 6 reps                      Braces/Orthotics/Lines/Etc:   · LUE sling   · O2 device: Room air  · Treatment/Session Assessment:    · Response to Treatment:  Pt demonstrated good participation. · Interdisciplinary Collaboration:   o Occupational Therapist  o Registered Nurse  · After treatment position/precautions:   o Supine in bed  o Bed alarm/tab alert on  o Bed/Chair-wheels locked  o Call light within reach  o RN notified   · Compliance with Program/Exercises: Compliant all of the time, Will assess as treatment progresses. · Recommendations/Intent for next treatment session: \"Next visit will focus on advancements to more challenging activities and reduction in assistance provided\".   Total Treatment Duration:  OT Patient Time In/Time Out  Time In: 1305  Time Out: Coy Brewer [address]

## 2020-01-22 NOTE — PROGRESS NOTES
Dysphagia(re evaluation 1/7/2020)  LTG: Patient will tolerate least restrictive diet without overt signs or symptoms of airway compromise. STG: Patient will tolerate mechanical soft diet(ground meats/chopped vegetables) and thin liquids by cup without overt signs or symptoms of airway compromise. STG: Patient will demonstrate use of compensatory strategies to improve swallow safety/function with 90% accuracy given minimal cueing  STG: Patient participate in exercises to improve oral motor movement with 80% accuracy given minimal cueing  STG: Patient will participate in modified barium swallow study as clinically indicated. Neurolinguistic Goals:  LTG: Patient will increase neuro-linguistic abilities to increase safety and awareness in functional living environment   STG: Patient will participate in speech/language/cognitive evaluation.  MET 12/17/19  STG: Patient will solve mathematical problems with 80%accuracy given minimal cueing   STG: Patient will name 10 items to concrete category in 1 minute given minimal cueing  STG: Patient will participate in verbal reasoning tasks with 80% accuracy given minimal cueing  STG: Patient will complete mental manipulation tasks with 80% accuracy given minimal cueing  STG: Patient will complete working memory/attention tasks with 80% accuracy given minimal cueing  STG: Patient will recall relevant verbal information with 80% accuracy with minimal assistance  STG: Patient will utilize memory compensatory strategies to improve recall of functional list/message with 90%accuracy given minimal assistance  STG: Patient will participate in ongoing cognitive linguistic evaluation for therapeutic assessment     SPEECH LANGUAGE PATHOLOGY: DYSPHAGIA and SPEECH/LANGUAGE- Daily Note 6    NAME/AGE/GENDER: Edilberto Dos Santos is a 53 y.o. male  DATE: 1/22/2020  PRIMARY DIAGNOSIS: Acute ischemic stroke ([facility] [I63.9]  Cerebrovascular accident (CVA) due to embolism ([facility] [I63.9]      ICD-10: Treatment Diagnosis: R13.12 Dysphagia, Oropharyngeal Phase    INTERDISCIPLINARY COLLABORATION: Registered Nurse  PRECAUTIONS/ALLERGIES: Patient has no known allergies. SUBJECTIVE   Alert upright in bed for session. Orientation:   Person  Place  Time  Situation    Pain: Pain Scale 1: Numeric (0 - 10)  Pain Intensity 1: 0     OBJECTIVE   Swallowing:   Not addressed during session    Speech/cognitive linguistic treatment:   Auditory memory:following 2 step directions with 4 components: 5/5 accurate given repetition of directions x1  Short term memory/reasoning: sequenced 3 words presented verbally based on given ranking with 4/6 accurate given min-mod verbal cues  Auditory comprehension/paragraph recall: answered open ended questions about 2 sentence paragraphs presented verbally with 6/10 accurate           ASSESSMENT   Swallowing: Not addressed. Continue current diet: mechanical soft diet/thin liquids by cup only. No straws. Speech/Cognitive Function:  Patient continue to present with mild-moderate cognitive linguistic impairment with reduced reasoning and short term memory. Reduced short term recall during structured tasks and reduced reasoning for sequencing tasks noted. Speech continues to be moderately dysarthric in conversation. Self corrections with unintelligible speech x2 this date. Recommend ongoing skilled intervention to improve swallow/function strength and cognitive linguistic abilities.           Tool Used: Dysphagia Outcome and Severity Scale (ROCHELLE)    Score Comments   Normal Diet  [] 7 With no strategies or extra time needed   Functional Swallow  [] 6 May have mild oral or pharyngeal delay   Mild Dysphagia  [] 5 Which may require one diet consistency restricted    Mild-Moderate Dysphagia  [] 4 With 1-2 diet consistencies restricted   Moderate Dysphagia  [] 3 With 2 or more diet consistencies restricted   Moderate-Severe Dysphagia  [] 2 With partial PO strategies (trials with ST only)   Severe Dysphagia  [] 1 With inability to tolerate any PO safely      Score:  Initial:4 Most Recent: 4 (Date 01/22/20 )   Interpretation of Tool: The Dysphagia Outcome and Severity Scale (ROCHELLE) is a simple, easy-to-use, 7-point scale developed to systematically rate the functional severity of dysphagia based on objective assessment and make recommendations for diet level, independence level, and type of nutrition. PLAN    FREQUENCY/DURATION: Continue to follow patient 3 times a week for duration of hospital stay to address above goals. - Recommendations for next treatment session: Next treatment will address dysarthria, dysphagia, cognition     REHABILITATION POTENTIAL FOR STATED GOALS: Good     COMPLIANCE WITH PROGRAM/EXERCISES: Will assess as treatment progresses    CONTINUATION OF SKILLED SERVICES/MEDICAL NECESSITY:   Patient is expected to demonstrate progress in  swallow strength, swallow timeliness, swallow function, diet tolerance and swallow safety in order to  improve swallow safety, work toward diet advancement and decrease aspiration risk.  Patient continues to require skilled intervention due to dysphagia. RECOMMENDATIONS   DIET:    PO:  Mechanical soft with ground meat/chopped vegatables   Liquids:  regular thin by cup only    MEDICATIONS: Crushed in puree     ASPIRATION PRECAUTIONS  · Slow rate of intake  · Small bites/sips  · Upright at 90 degrees during meal     COMPENSATORY STRATEGIES/MODIFICATIONS  · Alternate liquids/solids  · Small sips and bites  · Slow rate  · NO STRAWS   EDUCATION:  · Recommendations discussed with Nursing  · Patient     RECOMMENDATIONS for CONTINUED SPEECH THERAPY: YES: Anticipate need for ongoing speech therapy during this hospitalization and at next level of care.          SAFETY:  After treatment position/precautions:  · Upright in bed  · Call light within reach    Total Treatment Duration:   Time In: 1430  Time Out: [address] [place], CCC-SLP

## 2020-01-22 NOTE — PROGRESS NOTES
Problem: Patient Education: Go to Patient Education Activity  Goal: Patient/Family Education  Outcome: Progressing Towards Goal     Problem: Pressure Injury - Risk of  Goal: *Prevention of pressure injury  Description  Document Dewey Scale and appropriate interventions in the flowsheet. Outcome: Progressing Towards Goal  Note: Pressure Injury Interventions:  Sensory Interventions: Assess changes in LOC, Discuss PT/OT consult with provider, Float heels, Keep linens dry and wrinkle-free, Maintain/enhance activity level, Minimize linen layers, Pressure redistribution bed/mattress (bed type), Turn and reposition approx. every two hours (pillows and wedges if needed)    Moisture Interventions: Absorbent underpads    Activity Interventions: Increase time out of bed, PT/OT evaluation    Mobility Interventions: HOB 30 degrees or less, PT/OT evaluation    Nutrition Interventions: Document food/fluid/supplement intake, Discuss nutritional consult with provider, Offer support with meals,snacks and hydration    Friction and Shear Interventions: Apply protective barrier, creams and emollients                Problem: Patient Education: Go to Patient Education Activity  Goal: Patient/Family Education  Outcome: Progressing Towards Goal     Problem: Falls - Risk of  Goal: *Absence of Falls  Description  Document Sancho Marcus Fall Risk and appropriate interventions in the flowsheet.   Outcome: Progressing Towards Goal  Note: Fall Risk Interventions:  Mobility Interventions: Bed/chair exit alarm, Communicate number of staff needed for ambulation/transfer, Patient to call before getting OOB    Mentation Interventions: Bed/chair exit alarm    Medication Interventions: Bed/chair exit alarm, Teach patient to arise slowly, Patient to call before getting OOB    Elimination Interventions: Bed/chair exit alarm, Call light in reach, Patient to call for help with toileting needs, Toileting schedule/hourly rounds    History of Falls Interventions: Bed/chair exit alarm, Consult care management for discharge planning, Door open when patient unattended         Problem: Patient Education: Go to Patient Education Activity  Goal: Patient/Family Education  Outcome: Progressing Towards Goal     Problem: Diabetes Self-Management  Goal: *Disease process and treatment process  Description  Define diabetes and identify own type of diabetes; list 3 options for treating diabetes. Outcome: Progressing Towards Goal  Goal: *Incorporating nutritional management into lifestyle  Description  Describe effect of type, amount and timing of food on blood glucose; list 3 methods for planning meals. Outcome: Progressing Towards Goal  Goal: *Incorporating physical activity into lifestyle  Description  State effect of exercise on blood glucose levels. Outcome: Progressing Towards Goal  Goal: *Developing strategies to promote health/change behavior  Description  Define the ABC's of diabetes; identify appropriate screenings, schedule and personal plan for screenings. Outcome: Progressing Towards Goal  Goal: *Using medications safely  Description  State effect of diabetes medications on diabetes; name diabetes medication taking, action and side effects. Outcome: Progressing Towards Goal  Goal: *Monitoring blood glucose, interpreting and using results  Description  Identify recommended blood glucose targets  and personal targets. Outcome: Progressing Towards Goal  Goal: *Prevention, detection, treatment of acute complications  Description  List symptoms of hyper- and hypoglycemia; describe how to treat low blood sugar and actions for lowering  high blood glucose level. Outcome: Progressing Towards Goal  Goal: *Prevention, detection and treatment of chronic complications  Description  Define the natural course of diabetes and describe the relationship of blood glucose levels to long term complications of diabetes.   Outcome: Progressing Towards Goal  Goal: *Developing strategies to address psychosocial issues  Description  Describe feelings about living with diabetes; identify support needed and support network  Outcome: Progressing Towards Goal     Problem: Patient Education: Go to Patient Education Activity  Goal: Patient/Family Education  Outcome: Progressing Towards Goal     Problem: Patient Education: Go to Patient Education Activity  Goal: Patient/Family Education  Outcome: Progressing Towards Goal     Problem: Nutrition Deficit  Goal: *Optimize nutritional status  Outcome: Progressing Towards Goal     Problem: Patient Education: Go to Patient Education Activity  Goal: Patient/Family Education  Outcome: Progressing Towards Goal     Problem: General Medical Care Plan  Goal: *Vital signs within specified parameters  Outcome: Progressing Towards Goal  Goal: *Labs within defined limits  Outcome: Progressing Towards Goal  Goal: *Absence of infection signs and symptoms  Description  Wash hand more often   Outcome: Progressing Towards Goal  Goal: *Optimal pain control at patient's stated goal  Outcome: Progressing Towards Goal  Goal: *Skin integrity maintained  Outcome: Progressing Towards Goal  Goal: *Fluid volume balance  Outcome: Progressing Towards Goal  Goal: *Optimize nutritional status  Outcome: Progressing Towards Goal  Goal: *Anxiety reduced or absent  Outcome: Progressing Towards Goal  Goal: *Progressive mobility and function (eg: ADL's)  Outcome: Progressing Towards Goal     Problem: Patient Education: Go to Patient Education Activity  Goal: Patient/Family Education  Outcome: Progressing Towards Goal     Problem: Patient Education: Go to Patient Education Activity  Goal: Patient/Family Education  Outcome: Progressing Towards Goal     Problem: Patient Education: Go to Patient Education Activity  Goal: Patient/Family Education  Outcome: Progressing Towards Goal     Problem: Patient Education: Go to Patient Education Activity  Goal: Patient/Family Education  Outcome: Progressing Towards Goal     Problem: Ischemic Stroke: Discharge Outcomes  Goal: *Verbalizes anxiety and depression are reduced or absent  Outcome: Progressing Towards Goal  Goal: *Verbalize understanding of risk factor modification(Stroke Metric)  Outcome: Progressing Towards Goal  Goal: *Hemodynamically stable  Outcome: Progressing Towards Goal  Goal: *Absence of aspiration pneumonia  Outcome: Progressing Towards Goal  Goal: *Aware of needed dietary changes  Outcome: Progressing Towards Goal  Goal: *Verbalize understanding of prescribed medications including anti-coagulants, anti-lipid, and/or anti-platelets(Stroke Metric)  Outcome: Progressing Towards Goal  Goal: *Tolerating diet  Outcome: Progressing Towards Goal  Goal: *Aware of follow-up diagnostics related to anticoagulants  Outcome: Progressing Towards Goal  Goal: *Ability to perform ADLs and demonstrates progressive mobility and function  Outcome: Progressing Towards Goal  Goal: *Absence of DVT(Stroke Metric)  Outcome: Progressing Towards Goal  Goal: *Absence of aspiration  Outcome: Progressing Towards Goal  Goal: *Optimal pain control at patient's stated goal  Outcome: Progressing Towards Goal  Goal: *Home safety concerns addressed  Outcome: Progressing Towards Goal  Goal: *Describes available resources and support systems  Outcome: Progressing Towards Goal  Goal: *Verbalizes understanding of activation of EMS(911) for stroke symptoms(Stroke Metric)  Outcome: Progressing Towards Goal  Goal: *Understands and describes signs and symptoms to report to providers(Stroke Metric)  Outcome: Progressing Towards Goal  Goal: *Neurolgocially stable (absence of additional neurological deficits)  Outcome: Progressing Towards Goal  Goal: *Verbalizes importance of follow-up with primary care physician(Stroke Metric)  Outcome: Progressing Towards Goal  Goal: *Smoking cessation discussed,if applicable(Stroke Metric)  Outcome: Progressing Towards Goal  Goal: *Depression screening completed(Stroke Metric)  Outcome: Progressing Towards Goal     Problem: Pain  Goal: *Control of Pain  Outcome: Progressing Towards Goal     Problem: Patient Education: Go to Patient Education Activity  Goal: Patient/Family Education  Outcome: Progressing Towards Goal

## 2020-01-22 NOTE — PROGRESS NOTES
01/22/20 5133   NIH Stroke Scale   Interval Other (comment)  (dual with daniele rn)   LOC 0   LOC Questions 0   LOC Commands 0   Best Gaze 0   Visual 0   Facial Palsy 1   Motor Right Arm 0   Motor Left Arm 2   Motor Right Leg 0   Motor Left Leg 1   Limb Ataxia 0   Sensory 0   Best Language 1   Dysarthria 1   Extinction and Inattention 0   Total 6

## 2020-01-22 NOTE — PROGRESS NOTES
Follow-up visit with Mr. Teresa Christine and his wife Thee Tsai. Conveyed care and concern and offered spiritual interventions. No spiritual or emotional concerns were voiced during the visit.       Dontrell Mercer 66  Board Certified

## 2020-01-22 NOTE — PROGRESS NOTES
01/21/20 1900   NIH Stroke Scale   Interval Other (comment)   LOC 0   LOC Questions 0   LOC Commands 0   Best Gaze 0   Visual 0   Facial Palsy 1   Motor Right Arm 0   Motor Left Arm 2   Motor Right Leg 0   Motor Left Leg 2   Limb Ataxia 0   Sensory 0   Best Language 1   Dysarthria 1   Extinction and Inattention 0   Total 7   Dual NIH with Letty Baumann RN

## 2020-01-22 NOTE — PROGRESS NOTES
Problem: Patient Education: Go to Patient Education Activity  Goal: Patient/Family Education  Outcome: Progressing Towards Goal     Problem: Pressure Injury - Risk of  Goal: *Prevention of pressure injury  Description  Document Dewey Scale and appropriate interventions in the flowsheet. Outcome: Progressing Towards Goal  Note: Pressure Injury Interventions:  Sensory Interventions: Assess changes in LOC, Discuss PT/OT consult with provider, Float heels, Keep linens dry and wrinkle-free, Maintain/enhance activity level, Minimize linen layers, Pressure redistribution bed/mattress (bed type), Turn and reposition approx. every two hours (pillows and wedges if needed)    Moisture Interventions: Absorbent underpads, Assess need for specialty bed, Limit adult briefs, Minimize layers    Activity Interventions: Increase time out of bed, Pressure redistribution bed/mattress(bed type), PT/OT evaluation    Mobility Interventions: HOB 30 degrees or less, Pressure redistribution bed/mattress (bed type), PT/OT evaluation    Nutrition Interventions: Document food/fluid/supplement intake    Friction and Shear Interventions: Apply protective barrier, creams and emollients, Foam dressings/transparent film/skin sealants, HOB 30 degrees or less, Lift sheet                Problem: Patient Education: Go to Patient Education Activity  Goal: Patient/Family Education  Outcome: Progressing Towards Goal     Problem: Falls - Risk of  Goal: *Absence of Falls  Description  Document Jeanne Fall Risk and appropriate interventions in the flowsheet.   Outcome: Progressing Towards Goal  Note: Fall Risk Interventions:  Mobility Interventions: Bed/chair exit alarm, Communicate number of staff needed for ambulation/transfer, OT consult for ADLs, Patient to call before getting OOB, PT Consult for mobility concerns    Mentation Interventions: Bed/chair exit alarm    Medication Interventions: Patient to call before getting OOB, Teach patient to arise slowly, Bed/chair exit alarm    Elimination Interventions: Bed/chair exit alarm, Call light in reach, Patient to call for help with toileting needs, Stay With Me (per policy), Toilet paper/wipes in reach, Toileting schedule/hourly rounds    History of Falls Interventions: Bed/chair exit alarm, Door open when patient unattended, Investigate reason for fall         Problem: Patient Education: Go to Patient Education Activity  Goal: Patient/Family Education  Outcome: Progressing Towards Goal     Problem: Diabetes Self-Management  Goal: *Disease process and treatment process  Description  Define diabetes and identify own type of diabetes; list 3 options for treating diabetes. Outcome: Progressing Towards Goal  Goal: *Incorporating nutritional management into lifestyle  Description  Describe effect of type, amount and timing of food on blood glucose; list 3 methods for planning meals. Outcome: Progressing Towards Goal  Goal: *Incorporating physical activity into lifestyle  Description  State effect of exercise on blood glucose levels. Outcome: Progressing Towards Goal  Goal: *Developing strategies to promote health/change behavior  Description  Define the ABC's of diabetes; identify appropriate screenings, schedule and personal plan for screenings. Outcome: Progressing Towards Goal  Goal: *Using medications safely  Description  State effect of diabetes medications on diabetes; name diabetes medication taking, action and side effects. Outcome: Progressing Towards Goal  Goal: *Monitoring blood glucose, interpreting and using results  Description  Identify recommended blood glucose targets  and personal targets. Outcome: Progressing Towards Goal  Goal: *Prevention, detection, treatment of acute complications  Description  List symptoms of hyper- and hypoglycemia; describe how to treat low blood sugar and actions for lowering  high blood glucose level.   Outcome: Progressing Towards Goal  Goal: *Prevention, detection and treatment of chronic complications  Description  Define the natural course of diabetes and describe the relationship of blood glucose levels to long term complications of diabetes.   Outcome: Progressing Towards Goal  Goal: *Developing strategies to address psychosocial issues  Description  Describe feelings about living with diabetes; identify support needed and support network  Outcome: Progressing Towards Goal     Problem: Patient Education: Go to Patient Education Activity  Goal: Patient/Family Education  Outcome: Progressing Towards Goal     Problem: Patient Education: Go to Patient Education Activity  Goal: Patient/Family Education  Outcome: Progressing Towards Goal     Problem: Nutrition Deficit  Goal: *Optimize nutritional status  Outcome: Progressing Towards Goal     Problem: Patient Education: Go to Patient Education Activity  Goal: Patient/Family Education  Outcome: Progressing Towards Goal     Problem: General Medical Care Plan  Goal: *Vital signs within specified parameters  Outcome: Progressing Towards Goal  Goal: *Labs within defined limits  Outcome: Progressing Towards Goal  Goal: *Absence of infection signs and symptoms  Description  Wash hand more often   Outcome: Progressing Towards Goal  Goal: *Optimal pain control at patient's stated goal  Outcome: Progressing Towards Goal  Goal: *Skin integrity maintained  Outcome: Progressing Towards Goal  Goal: *Fluid volume balance  Outcome: Progressing Towards Goal  Goal: *Optimize nutritional status  Outcome: Progressing Towards Goal  Goal: *Anxiety reduced or absent  Outcome: Progressing Towards Goal  Goal: *Progressive mobility and function (eg: ADL's)  Outcome: Progressing Towards Goal     Problem: Patient Education: Go to Patient Education Activity  Goal: Patient/Family Education  Outcome: Progressing Towards Goal     Problem: Patient Education: Go to Patient Education Activity  Goal: Patient/Family Education  Outcome: Progressing Towards Goal Problem: Patient Education: Go to Patient Education Activity  Goal: Patient/Family Education  Outcome: Progressing Towards Goal     Problem: Patient Education: Go to Patient Education Activity  Goal: Patient/Family Education  Outcome: Progressing Towards Goal     Problem: Ischemic Stroke: Discharge Outcomes  Goal: *Verbalizes anxiety and depression are reduced or absent  Outcome: Progressing Towards Goal  Goal: *Verbalize understanding of risk factor modification(Stroke Metric)  Outcome: Progressing Towards Goal  Goal: *Hemodynamically stable  Outcome: Progressing Towards Goal  Goal: *Absence of aspiration pneumonia  Outcome: Progressing Towards Goal  Goal: *Aware of needed dietary changes  Outcome: Progressing Towards Goal  Goal: *Verbalize understanding of prescribed medications including anti-coagulants, anti-lipid, and/or anti-platelets(Stroke Metric)  Outcome: Progressing Towards Goal  Goal: *Tolerating diet  Outcome: Progressing Towards Goal  Goal: *Aware of follow-up diagnostics related to anticoagulants  Outcome: Progressing Towards Goal  Goal: *Ability to perform ADLs and demonstrates progressive mobility and function  Outcome: Progressing Towards Goal  Goal: *Absence of DVT(Stroke Metric)  Outcome: Progressing Towards Goal  Goal: *Absence of aspiration  Outcome: Progressing Towards Goal  Goal: *Optimal pain control at patient's stated goal  Outcome: Progressing Towards Goal  Goal: *Home safety concerns addressed  Outcome: Progressing Towards Goal  Goal: *Describes available resources and support systems  Outcome: Progressing Towards Goal  Goal: *Verbalizes understanding of activation of EMS(911) for stroke symptoms(Stroke Metric)  Outcome: Progressing Towards Goal  Goal: *Understands and describes signs and symptoms to report to providers(Stroke Metric)  Outcome: Progressing Towards Goal  Goal: *Neurolgocially stable (absence of additional neurological deficits)  Outcome: Progressing Towards Goal  Goal: *Verbalizes importance of follow-up with primary care physician(Stroke Metric)  Outcome: Progressing Towards Goal  Goal: *Smoking cessation discussed,if applicable(Stroke Metric)  Outcome: Progressing Towards Goal  Goal: *Depression screening completed(Stroke Metric)  Outcome: Progressing Towards Goal     Problem: Pain  Goal: *Control of Pain  Outcome: Progressing Towards Goal     Problem: Patient Education: Go to Patient Education Activity  Goal: Patient/Family Education  Outcome: Progressing Towards Goal

## 2020-01-22 NOTE — PROGRESS NOTES
01/22/20 0722   NIH Stroke Scale   Interval Other (comment)   LOC 0   LOC Questions 0   LOC Commands 0   Best Gaze 0   Visual 0   Facial Palsy 1   Motor Right Arm 0   Motor Left Arm 2   Motor Right Leg 0   Motor Left Leg 1   Limb Ataxia 0   Sensory 0   Best Language 1   Dysarthria 1   Extinction and Inattention 0   Total 6   dual nih with reese rn

## 2020-01-23 LAB — GLUCOSE BLD STRIP.AUTO-MCNC: 94 MG/DL (ref 65–100)

## 2020-01-23 PROCEDURE — 65270000029 HC RM PRIVATE

## 2020-01-23 PROCEDURE — 97530 THERAPEUTIC ACTIVITIES: CPT

## 2020-01-23 PROCEDURE — 82962 GLUCOSE BLOOD TEST: CPT

## 2020-01-23 PROCEDURE — 74011250637 HC RX REV CODE- 250/637: Performed by: INTERNAL MEDICINE

## 2020-01-23 PROCEDURE — 74011250637 HC RX REV CODE- 250/637: Performed by: HOSPITALIST

## 2020-01-23 PROCEDURE — 74011250636 HC RX REV CODE- 250/636: Performed by: INTERNAL MEDICINE

## 2020-01-23 RX ADMIN — ACETAMINOPHEN 650 MG: 325 TABLET, FILM COATED ORAL at 17:41

## 2020-01-23 RX ADMIN — ASPIRIN 81 MG 81 MG: 81 TABLET ORAL at 08:22

## 2020-01-23 RX ADMIN — METFORMIN HYDROCHLORIDE 500 MG: 500 TABLET, FILM COATED ORAL at 08:22

## 2020-01-23 RX ADMIN — GUAIFENESIN 100 MG: 100 SOLUTION ORAL at 17:41

## 2020-01-23 RX ADMIN — ENOXAPARIN SODIUM 40 MG: 40 INJECTION SUBCUTANEOUS at 14:26

## 2020-01-23 RX ADMIN — ATORVASTATIN CALCIUM 80 MG: 80 TABLET, FILM COATED ORAL at 21:38

## 2020-01-23 RX ADMIN — METOPROLOL SUCCINATE 25 MG: 25 TABLET, FILM COATED, EXTENDED RELEASE ORAL at 08:21

## 2020-01-23 RX ADMIN — METFORMIN HYDROCHLORIDE 500 MG: 500 TABLET, FILM COATED ORAL at 17:41

## 2020-01-23 RX ADMIN — LISINOPRIL 40 MG: 20 TABLET ORAL at 08:22

## 2020-01-23 NOTE — INTERDISCIPLINARY ROUNDS
Interdisciplinary team rounds were held 1/23/2020 with the following team members:  Care Management, Occupational Therapy, Physician and . Plan of care discussed. See clinical pathway and/or care plan for interventions and desired outcomes.

## 2020-01-23 NOTE — PROGRESS NOTES
01/23/20 0706   NIH Stroke Scale   Interval Other (comment)   LOC 0   LOC Questions 0   LOC Commands 0   Best Gaze 0   Visual 0   Facial Palsy 0   Motor Right Arm 0   Motor Left Arm 2   Motor Right Leg 0   Motor Left Leg 2   Limb Ataxia 0   Sensory 0   Best Language 1   Dysarthria 1   Extinction and Inattention 0   Total 6   dual nih with christal EDWARDS

## 2020-01-23 NOTE — PROGRESS NOTES
Problem: Mobility Impaired (Adult and Pediatric)  Goal: *Acute Goals and Plan of Care  Description  Acute PT Goals (reviewed & updated 1/17/2020):  STG:  (1.) Mr. Naun Hampton will move from supine to sit and sit to supine , scoot up and down and roll side to side with CONTACT GUARD ASSIST within 3 treatment day(s). (2.) Mr. Naun Hampton will transfer from bed to chair and chair to bed with MODERATE ASSIST x1 using the least restrictive device within 3 treatment day(s). (3.) Mr. Naun Hampton will perform standing static and dynamic balance activities x 10 minutes with MODERATE ASSIST x1 to improve safety within 3 treatment day(s). LTG:  (1.) Mr. Naun Hampton will move from supine to sit and sit to supine , scoot up and down and roll side to side with STAND BY ASSIST within 7 treatment day(s). (2.) Mr. Naun Hampton will transfer from bed to chair and chair to bed with MINIMAL ASSIST using the least restrictive device within 7 treatment day(s). (3.) Mr. Naun Hampton will ambulate with MODERATE ASSIST for 20+ feet with the least restrictive device within 7 treatment day(s). (4.) Mr. Naun Hampton will perform standing static and dynamic balance activities x 20 minutes with MINIMAL ASSIST to improve safety within 7 treatment day(s). (5.) Mr. Naun Hampton will perform bilateral lower extremity exercises x 15 min for HEP with SUPERVISION to improve strength, endurance, and functional mobility within 7 treatment day(s).      PHYSICAL THERAPY: Daily Note and PM 1/23/2020  INPATIENT: PT Visit Days : 3  Payor: MEDICAID PENDING / Plan: Radha Perry PENDING / Product Type: Medicaid /       NAME/AGE/GENDER: Ryne Loera is a 53 y.o. male   PRIMARY DIAGNOSIS: Acute ischemic stroke ([address] [I63.9]  Cerebrovascular accident (CVA) due to embolism ([address] [T52.3] Acute embolic stroke ([address] Acute embolic stroke ([address]       ICD-10: Treatment Diagnosis:   · Other lack of cordination (R27.8)  · Difficulty in walking, Not elsewhere classified (R26.2)  · Other abnormalities of gait and mobility (R26.89)  · Unspecified Lack of Coordination (R27.9)  · Hemiplegia and hemiparesis following cerebral infarction affecting   · left non-dominant side (I85.789)   Precaution/Allergies:  Patient has no known allergies. ASSESSMENT:     Mr. Annie Sykes is a 53year old admitted with acute CVA with left hemiparesis and left inattention. 1/23/2020 PM:  Pt presents supine in bed, agreeable to therapy. Pt worked on supine to sit with min assist.  Pt sat edge of bed with SBA, one time required min assist for balance when he was trying to reposition. Pt stood and was able to stand about 3 minutes to luke walker and min assist x 2. Pt was getting tired and returned sitting on side of bed. After resting, pt stood again and took a few side steps toward [facility] using luke walker and min/mod assist x 2. Pt does require assistance to advance L LE and cues to lean onto HW to promote safety and dynamic standing balance control. Pt returned supine with min assist and was positioned to comfort. Pt is making steady progress with therapy and puts forth great effort, less assist to stand today. Will continue with POC. This section established at most recent assessment   PROBLEM LIST (Impairments causing functional limitations):  1. Decreased Strength  2. Decreased ADL/Functional Activities  3. Decreased Transfer Abilities  4. Decreased Ambulation Ability/Technique  5. Decreased Balance  6. Increased Pain  7. Decreased Activity Tolerance  8. Increased Fatigue  9. Decreased Flexibility/Joint Mobility   INTERVENTIONS PLANNED: (Benefits and precautions of physical therapy have been discussed with the patient.)  1. Balance Exercise  2. Bed Mobility  3. Family Education  4. Gait Training  5. Home Exercise Program (HEP)  6. Manual Therapy  7. Neuromuscular Re-education/Strengthening  8. Range of Motion (ROM)  9. Therapeutic Activites  10. Therapeutic Exercise/Strengthening  11.  Transfer Training TREATMENT PLAN: Frequency/Duration: 3 times a week for duration of hospital stay  Rehabilitation Potential For Stated Goals: Good     REHAB RECOMMENDATIONS (at time of discharge pending progress):    Placement: It is my opinion, based on this patient's performance to date, that Mr. Breanna Colbert may benefit from intensive therapy at an [address] [place] [address] after discharge due to a probable need for 24 hour rehab nursing, a probable need for multiple therapy disciplines, and potential to make ongoing and sustainable functional improvement that is of practical value. .  Equipment:    TBD pending progress with therapy. HISTORY:   History of Present Injury/Illness (Reason for Referral):  Per H&P: \"Pt is a 54 y/o smoker with DM, HTN, who presented to ER with L leg and arm weakness, L facial droop, dysarthria. First noted L leg weakness late night 12/11 when he woke up to go to the bathroom. He was normal when he went to bed around 1030. Woke up this morning and had persistent weakness L leg and also now noted in L arm. EMS called. Noted to have slurred speech and L facial droop as well. Code S called in ER around 9am.  MRI with acute infarcts in L cerebellar hemisphere, deep frontoparietal white matter, and R paramedian yariel. Also noted old lacunar infarcts. No large vessel occlusion on CTA, but some stenosis noted. No hx afib, TIA, CVA. No CP, palpitations, SOB. \"  Past Medical History/Comorbidities:   Mr. Breanna Colbert  has a past medical history of Acute ischemic stroke ([address] (12/12/2019), Acute pancreatitis (11/19/2014), Cerebrovascular accident (CVA) due to embolism ([address] (12/12/2019), Diabetes ([address] (2002), Diabetes ([address] Diabetes mellitus, and Hypertension.  He also has no past medical history of Arthritis, Asthma, Autoimmune disease ([address] CAD (coronary artery disease), Cancer ([address] Chronic kidney disease, COPD, Dementia, Dementia ([address] Heart failure ([address] Ill-defined condition, Infectious disease, Liver disease, Other ill-defined conditions(799.89), Psychiatric disorder, PUD (peptic ulcer disease), Seizures ([address] or Sleep disorder. Mr. Sneha Munguia  has a past surgical history that includes hx hernia repair and hx orthopaedic. Social History/Living Environment:   Home Environment: Apartment  # Steps to Enter: 12  Rails to Enter: Yes  [employer] : Bilateral  One/Two Story Residence: One story  Living Alone: No  Support Systems: Spouse/Significant Other/Partner  Patient Expects to be Discharged to[de-identified] Unknown  Current DME Used/Available at Home: None  Tub or Shower Type: Tub/Shower combination  Prior Level of Function/Work/Activity:  Independent, lives with wife in 2nd story 1 level apartment. No recent falls. Number of Personal Factors/Comorbidities that affect the Plan of Care: 1-2: MODERATE COMPLEXITY   EXAMINATION:   Most Recent Physical Functioning:   Gross Assessment:                  Posture:     Balance:  Sitting - Static: Good (unsupported)(-)  Sitting - Dynamic: Fair (occasional)  Standing - Static: Fair;Poor;Constant support  Standing - Dynamic : Poor;Constant support Bed Mobility:  Supine to Sit: Moderate assistance  Sit to Supine: Minimum assistance; Moderate assistance  Wheelchair Mobility:     Transfers:  Sit to Stand: Minimum assistance;Assist x2  Stand to Sit: Minimum assistance;Assist x2  Gait:     Base of Support: Center of gravity altered;Narrowed  Speed/Clotilde: Slow;Delayed; Shuffled  Step Length: Left shortened;Right shortened  Gait Abnormalities: Decreased step clearance; Hemiplegic  Distance (ft): 2 Feet (ft)  Assistive Device: Walker luke  Ambulation - Level of Assistance: Minimal assistance; Moderate assistance;Assist x2      Body Structures Involved:  1. Nerves  2. Voice/Speech  3. Bones  4. Joints  5. Muscles Body Functions Affected:  1. Mental  2. Sensory/Pain  3. Neuromusculoskeletal  4. Movement Related Activities and Participation Affected:  1.  General Tasks and Demands  2. Mobility  3. Self Care  4. Interpersonal Interactions and Relationships   Number of elements that affect the Plan of Care: 4+: HIGH COMPLEXITY   CLINICAL PRESENTATION:   Presentation: Evolving clinical presentation with changing clinical characteristics: MODERATE COMPLEXITY   CLINICAL DECISION MAKIN [facility] Short Form  How much difficulty does the patient currently have. .. Unable A Lot A Little None   1. Turning over in bed (including adjusting bedclothes, sheets and blankets)? [] 1   [] 2   [x] 3   [] 4   2. Sitting down on and standing up from a chair with arms ( e.g., wheelchair, bedside commode, etc.)   [] 1   [x] 2   [] 3   [] 4   3. Moving from lying on back to sitting on the side of the bed? [] 1   [] 2   [x] 3   [] 4   How much help from another person does the patient currently need. .. Total A Lot A Little None   4. Moving to and from a bed to a chair (including a wheelchair)? [] 1   [x] 2   [] 3   [] 4   5. Need to walk in hospital room? [] 1   [x] 2   [] 3   [] 4   6. Climbing 3-5 steps with a railing? [] 1   [x] 2   [] 3   [] 4   © , Trustees of [address] [place] [address] [address] [id number], under license to [employer]. All rights reserved      Score:  Initial: 13 Most Recent: 14 (Date:2020)    Interpretation of Tool:  Represents activities that are increasingly more difficult (i.e. Bed mobility, Transfers, Gait). Medical Necessity:     · Patient is expected to demonstrate progress in   · strength, range of motion, balance, coordination, and functional technique  ·  to   · increase independence with all mobility. · .  Reason for Services/Other Comments:  · Patient continues to require skilled intervention due to   · medical complications and mobility deficits which impact his level of function, safety, and independence as indicated above.    · .   Use of outcome tool(s) and clinical judgement create a POC that gives a: Questionable prediction of patient's progress: MODERATE COMPLEXITY        TREATMENT:      Pre-treatment Symptoms/Complaints:  Pleasant and agreeable to therapy. Pain: Initial:   6/10 L side, RN notified. Post Session:  Resting comfortably in bed     Therapeutic Activity: (    25 minutes): Therapeutic activities including Bed transfers, Chair transfers, Ambulation on level ground and sit to stand transfers to improve mobility, strength, balance and coordination. Required moderate   to promote dynamic balance in standing and promote coordination of bilateral, lower extremity(s). Therapeutic Exercise: (  0 minutes):  Exercises per grid below to improve mobility and strength. Required moderate visual, verbal and manual cues to promote proper body posture and promote proper body mechanics. Progressed range and repetitions as indicated. Date:  1/21/20 Date:   Date:     ACTIVITY/EXERCISE AM PM AM PM AM PM   Ambulation:           Distance  Device  Duration         Seated Heel Raises X 10 B, P-L        Seated Toe Raises X 10 B, P-L        Seated Long Arc Quads X 10 B, AA-L        Seated Marching X 10 B, AA-L        Seated knee squeezes X 15 B, AA-L                 B = bilateral; AA = active assistive; A = active; P = passive              Braces/Orthotics/Lines/Etc:   · O2 Device: Room Air   Treatment/Session Assessment:    · Response to Treatment:  See above  · Interdisciplinary Collaboration:   o Physical Therapy Assistant  o Registered Nurse  o Rehabilitation Attendant  o Certified Nursing Assistant/Patient Care Technician  · After treatment position/precautions:   o Supine in bed  o Bed alarm/tab alert on  o Bed/Chair-wheels locked  o Bed in low position  o Call light within reach  o RN notified   · Compliance with Program/Exercises: Compliant all of the time  · Recommendations/Intent for next treatment session:   \"Next visit will focus on advancements to more challenging activities and reduction in assistance provided\".     Total Treatment Duration:  PT Patient Time In/Time Out  Time In: 1535  Time Out: Alisseth 81, PTA

## 2020-01-23 NOTE — PROGRESS NOTES
Problem: Patient Education: Go to Patient Education Activity  Goal: Patient/Family Education  Outcome: Progressing Towards Goal     Problem: Pressure Injury - Risk of  Goal: *Prevention of pressure injury  Description  Document Dewey Scale and appropriate interventions in the flowsheet. Outcome: Progressing Towards Goal  Note: Pressure Injury Interventions:  Sensory Interventions: Assess changes in LOC, Discuss PT/OT consult with provider, Float heels, Keep linens dry and wrinkle-free, Maintain/enhance activity level, Minimize linen layers, Pressure redistribution bed/mattress (bed type), Turn and reposition approx. every two hours (pillows and wedges if needed)    Moisture Interventions: Absorbent underpads, Check for incontinence Q2 hours and as needed, Limit adult briefs, Minimize layers    Activity Interventions: Increase time out of bed, Pressure redistribution bed/mattress(bed type), PT/OT evaluation    Mobility Interventions: HOB 30 degrees or less, Pressure redistribution bed/mattress (bed type), PT/OT evaluation    Nutrition Interventions: Document food/fluid/supplement intake, Offer support with meals,snacks and hydration    Friction and Shear Interventions: Apply protective barrier, creams and emollients                Problem: Patient Education: Go to Patient Education Activity  Goal: Patient/Family Education  Outcome: Progressing Towards Goal     Problem: Falls - Risk of  Goal: *Absence of Falls  Description  Document Jeanne Fall Risk and appropriate interventions in the flowsheet.   Outcome: Progressing Towards Goal  Note: Fall Risk Interventions:  Mobility Interventions: Bed/chair exit alarm, Communicate number of staff needed for ambulation/transfer, OT consult for ADLs, Patient to call before getting OOB, PT Consult for mobility concerns    Mentation Interventions: Bed/chair exit alarm, Adequate sleep, hydration, pain control, Door open when patient unattended, Increase mobility, More frequent rounding, Reorient patient    Medication Interventions: Patient to call before getting OOB, Teach patient to arise slowly, Bed/chair exit alarm    Elimination Interventions: Bed/chair exit alarm, Call light in reach, Patient to call for help with toileting needs, Stay With Me (per policy), Toilet paper/wipes in reach, Toileting schedule/hourly rounds    History of Falls Interventions: Bed/chair exit alarm, Door open when patient unattended, Investigate reason for fall         Problem: Patient Education: Go to Patient Education Activity  Goal: Patient/Family Education  Outcome: Progressing Towards Goal     Problem: Diabetes Self-Management  Goal: *Disease process and treatment process  Description  Define diabetes and identify own type of diabetes; list 3 options for treating diabetes. Outcome: Progressing Towards Goal  Goal: *Incorporating nutritional management into lifestyle  Description  Describe effect of type, amount and timing of food on blood glucose; list 3 methods for planning meals. Outcome: Progressing Towards Goal  Goal: *Incorporating physical activity into lifestyle  Description  State effect of exercise on blood glucose levels. Outcome: Progressing Towards Goal  Goal: *Developing strategies to promote health/change behavior  Description  Define the ABC's of diabetes; identify appropriate screenings, schedule and personal plan for screenings. Outcome: Progressing Towards Goal  Goal: *Using medications safely  Description  State effect of diabetes medications on diabetes; name diabetes medication taking, action and side effects. Outcome: Progressing Towards Goal  Goal: *Monitoring blood glucose, interpreting and using results  Description  Identify recommended blood glucose targets  and personal targets.   Outcome: Progressing Towards Goal  Goal: *Prevention, detection, treatment of acute complications  Description  List symptoms of hyper- and hypoglycemia; describe how to treat low blood sugar and actions for lowering  high blood glucose level. Outcome: Progressing Towards Goal  Goal: *Prevention, detection and treatment of chronic complications  Description  Define the natural course of diabetes and describe the relationship of blood glucose levels to long term complications of diabetes.   Outcome: Progressing Towards Goal  Goal: *Developing strategies to address psychosocial issues  Description  Describe feelings about living with diabetes; identify support needed and support network  Outcome: Progressing Towards Goal     Problem: Patient Education: Go to Patient Education Activity  Goal: Patient/Family Education  Outcome: Progressing Towards Goal     Problem: Patient Education: Go to Patient Education Activity  Goal: Patient/Family Education  Outcome: Progressing Towards Goal     Problem: Nutrition Deficit  Goal: *Optimize nutritional status  Outcome: Progressing Towards Goal     Problem: Patient Education: Go to Patient Education Activity  Goal: Patient/Family Education  Outcome: Progressing Towards Goal     Problem: General Medical Care Plan  Goal: *Vital signs within specified parameters  Outcome: Progressing Towards Goal  Goal: *Labs within defined limits  Outcome: Progressing Towards Goal  Goal: *Absence of infection signs and symptoms  Description  Wash hand more often   Outcome: Progressing Towards Goal  Goal: *Optimal pain control at patient's stated goal  Outcome: Progressing Towards Goal  Goal: *Skin integrity maintained  Outcome: Progressing Towards Goal  Goal: *Fluid volume balance  Outcome: Progressing Towards Goal  Goal: *Optimize nutritional status  Outcome: Progressing Towards Goal  Goal: *Anxiety reduced or absent  Outcome: Progressing Towards Goal  Goal: *Progressive mobility and function (eg: ADL's)  Outcome: Progressing Towards Goal     Problem: Patient Education: Go to Patient Education Activity  Goal: Patient/Family Education  Outcome: Progressing Towards Goal     Problem: Patient Education: Go to Patient Education Activity  Goal: Patient/Family Education  Outcome: Progressing Towards Goal     Problem: Patient Education: Go to Patient Education Activity  Goal: Patient/Family Education  Outcome: Progressing Towards Goal     Problem: Patient Education: Go to Patient Education Activity  Goal: Patient/Family Education  Outcome: Progressing Towards Goal     Problem: Ischemic Stroke: Discharge Outcomes  Goal: *Verbalizes anxiety and depression are reduced or absent  Outcome: Progressing Towards Goal  Goal: *Verbalize understanding of risk factor modification(Stroke Metric)  Outcome: Progressing Towards Goal  Goal: *Hemodynamically stable  Outcome: Progressing Towards Goal  Goal: *Absence of aspiration pneumonia  Outcome: Progressing Towards Goal  Goal: *Aware of needed dietary changes  Outcome: Progressing Towards Goal  Goal: *Verbalize understanding of prescribed medications including anti-coagulants, anti-lipid, and/or anti-platelets(Stroke Metric)  Outcome: Progressing Towards Goal  Goal: *Tolerating diet  Outcome: Progressing Towards Goal  Goal: *Aware of follow-up diagnostics related to anticoagulants  Outcome: Progressing Towards Goal  Goal: *Ability to perform ADLs and demonstrates progressive mobility and function  Outcome: Progressing Towards Goal  Goal: *Absence of DVT(Stroke Metric)  Outcome: Progressing Towards Goal  Goal: *Absence of aspiration  Outcome: Progressing Towards Goal  Goal: *Optimal pain control at patient's stated goal  Outcome: Progressing Towards Goal  Goal: *Home safety concerns addressed  Outcome: Progressing Towards Goal  Goal: *Describes available resources and support systems  Outcome: Progressing Towards Goal  Goal: *Verbalizes understanding of activation of EMS(911) for stroke symptoms(Stroke Metric)  Outcome: Progressing Towards Goal  Goal: *Understands and describes signs and symptoms to report to providers(Stroke Metric)  Outcome: Progressing Towards Goal  Goal: *Neurolgocially stable (absence of additional neurological deficits)  Outcome: Progressing Towards Goal  Goal: *Verbalizes importance of follow-up with primary care physician(Stroke Metric)  Outcome: Progressing Towards Goal  Goal: *Smoking cessation discussed,if applicable(Stroke Metric)  Outcome: Progressing Towards Goal  Goal: *Depression screening completed(Stroke Metric)  Outcome: Progressing Towards Goal     Problem: Pain  Goal: *Control of Pain  Outcome: Progressing Towards Goal     Problem: Patient Education: Go to Patient Education Activity  Goal: Patient/Family Education  Outcome: Progressing Towards Goal

## 2020-01-23 NOTE — PROGRESS NOTES
Progress Note    2020  Admit Date: 2019  9:07 AM   NAME: Austin Lo   :  1975   MRN:  [id number]   Attending: Debbie Perera MD  PCP:  Vivien Alberts MD  Treatment Team: Attending Provider: Debbie Perera MD; Care Manager: Nicole Blanc, RN; Care Manager: Taisha Grimaldo, [facility] [place]; Consulting Provider: Cruz Andrade MD; Utilization Review: Alyssa Muniz RN; Nurse Practitioner: Syed Mccarthy NP; Charge Nurse: Heath Paul; Physical Therapy Assistant: Amira Cook; Speech Language Pathologist: Home Rubalcava    Full Code     This is a 52 yo male with PMH of DM II, HTN who presented 19 with c/o left leg and arm weakness, left facial droop and dysarthria. Code S called. MRI with acute infarctions in left cerebellar hemisphere, deep frontoparietal white matter and right paramedian yariel. Also noted to have old lacunar infarcts. CTA without large vessel occlusions, however some stenosis noted. Echo showed PFO, Cardiology to f/u outpatient for evaluation for closure. Neurology consulted. Started on ASA, statin. Pt is uninsured, difficult placement, case management working on plan. SUBJECTIVE:    Pt still has slurred speech and weakness in the left upper and lower extremities. No fever.  No tingling, no numbness,     Past Medical History:   Diagnosis Date    Acute ischemic stroke ([address] 2019    Acute pancreatitis 2014    Cerebrovascular accident (CVA) due to embolism ([address] 2019    Diabetes ([address] 2002    Diabetes ([address]     Diabetes mellitus     Hypertension      Recent Results (from the past 24 hour(s))   GLUCOSE, POC    Collection Time: 20  7:16 AM   Result Value Ref Range    Glucose (POC) 94 65 - 100 mg/dL     No Known Allergies  Current Facility-Administered Medications   Medication Dose Route Frequency Provider Last Rate Last Dose    metFORMIN (GLUCOPHAGE) tablet 500 mg  500 mg Oral BID WITH MEALS Skyler Mobley MD   500 mg at 20 4816    metoprolol succinate (TOPROL-XL) XL tablet 25 mg  25 mg Oral DAILY Berto Borja MD   25 mg at 20 5106    polyethylene glycol (MIRALAX) packet 17 g  17 g Oral DAILY PRN Alexranda  C, DO   17 g at 20 3950    guaiFENesin (ROBITUSSIN) 100 mg/5 mL oral liquid 100 mg  100 mg Oral Q4H PRN Berto Borja MD   100 mg at 20 1945    hydrALAZINE (APRESOLINE) 20 mg/mL injection 20 mg  20 mg IntraVENous Q4H PRN Vivien Rosas MD   20 mg at 19 0133    atorvastatin (LIPITOR) tablet 80 mg  80 mg Oral QHS Vivien Rosas MD   80 mg at 208    lisinopril (PRINIVIL, ZESTRIL) tablet 40 mg  40 mg Oral DAILY Vivien Rosas MD   40 mg at 20 3461    sodium chloride (NS) flush 5-40 mL  5-40 mL IntraVENous PRN Vivien Rosas MD   5 mL at 01/15/20 1529    ondansetron [facility]) injection 4 mg  4 mg IntraVENous Q4H PRN Vivien Rosas MD        aspirin chewable tablet 81 mg  81 mg Oral DAILY Vivien Rosas MD   81 mg at 20 2502    acetaminophen (TYLENOL) tablet 650 mg  650 mg Oral Q4H PRN Vivien Rosas MD   650 mg at 20 1945    enoxaparin (LOVENOX) injection 40 mg  40 mg SubCUTAneous Q24H Vivien Rosas MD   40 mg at 20 1426    dextrose 40% (GLUTOSE) oral gel 1 Tube  15 g Oral PRN Vivien Rosas MD        glucagon [facility]) injection 1 mg  1 mg IntraMUSCular PRN Vivien Rosas MD        dextrose (D50W) injection syrg 12.5-25 g  25-50 mL IntraVENous PRN Vviien Rosas MD           Review of Systems negative with exception of pertinent positives noted above  PHYSICAL EXAM     Visit Vitals  /78 (BP 1 Location: Right arm, BP Patient Position: At rest)   Pulse 61   Temp 98 °F (36.7 °C)   Resp 16   Ht 5' 6\" (1.676 m)   Wt 95.3 kg (210 lb)   SpO2 98%   BMI 33.89 kg/m²      Temp (24hrs), Av.2 °F (36.8 °C), Min:97.7 °F (36.5 °C), Max:99.1 °F (37.3 °C)    Oxygen Therapy  O2 Sat (%): 98 % (01/23/20 1200)  Pulse via Oximetry: 56 beats per minute (01/05/20 1918)  O2 Device: Room air (01/05/20 1930)  No intake or output data in the 24 hours ending 01/23/20 1605   General: No acute distress    Lungs: CTA bilaterally. Resp even and nonlabored  Heart:  S1S2 present without murmurs rubs gallops. RRR. No LE edema  Abdomen: Soft, non tender, non distended. BS present  Extremities: No cyanosis. Left UE/LE flaccid  Neurologic:  A/O X3. Dysarthria noted. Left facial droop. Sensation intact. LUE/LLE strength 2/5. Results summary of Diagnostic Studies/Procedures copied from within [facility] EMR:        Edmar Dixon 96 Problems    Diagnosis Date Noted    PFO (patent foramen ovale) 12/17/2019    Arrhythmia 12/15/2019    Hyperlipemia 28/15/9712    Acute embolic stroke ([facility] 71/44/8903    Type 2 diabetes mellitus (HCC)     Hypertension      Plan:    -Acute embolic stroke: POA  Dysphagia   MRI head: with acute infarcts in L cerebellar hemisphere, deep frontoparietal white matter, and R paramedian yariel.  Also noted old lacunar infarcts. ISMAEL: 3 mm PFO, needs to f/u outpatient with Cardiology for evaluation for closure   Cont statin, ASA  PT/OT/Speech     -Hypertension: continue home meds      -Type 2 diabetes mellitus:  Metformin     -Arrhythmia: had brief wide complex tachycardia early in admission, will need event monitor as outpt per cards    DVT Prophylaxis: lovenox    Difficult placement, case management working on plan. Patient has no medical insurance. Pt can't go home as pt's wife is disabled and is not safe at home without supervision as pt needs long term care.       Edda Herrera MD

## 2020-01-23 NOTE — PROGRESS NOTES
Asked patient if he had any trouble breathing or other discomfort. Patient denied any pain. Left him resting comfortably in semi-Roman position. Will continue to monitor.  Modesto Palafox Student Nurse

## 2020-01-24 PROCEDURE — 74011250637 HC RX REV CODE- 250/637: Performed by: INTERNAL MEDICINE

## 2020-01-24 PROCEDURE — 74011250636 HC RX REV CODE- 250/636: Performed by: INTERNAL MEDICINE

## 2020-01-24 PROCEDURE — 74011250637 HC RX REV CODE- 250/637: Performed by: HOSPITALIST

## 2020-01-24 PROCEDURE — 97530 THERAPEUTIC ACTIVITIES: CPT

## 2020-01-24 PROCEDURE — 92507 TX SP LANG VOICE COMM INDIV: CPT

## 2020-01-24 PROCEDURE — 97110 THERAPEUTIC EXERCISES: CPT

## 2020-01-24 PROCEDURE — 65270000029 HC RM PRIVATE

## 2020-01-24 RX ADMIN — ACETAMINOPHEN 650 MG: 325 TABLET, FILM COATED ORAL at 08:48

## 2020-01-24 RX ADMIN — METFORMIN HYDROCHLORIDE 500 MG: 500 TABLET, FILM COATED ORAL at 17:42

## 2020-01-24 RX ADMIN — METFORMIN HYDROCHLORIDE 500 MG: 500 TABLET, FILM COATED ORAL at 08:48

## 2020-01-24 RX ADMIN — GUAIFENESIN 100 MG: 100 SOLUTION ORAL at 08:49

## 2020-01-24 RX ADMIN — METOPROLOL SUCCINATE 25 MG: 25 TABLET, FILM COATED, EXTENDED RELEASE ORAL at 08:48

## 2020-01-24 RX ADMIN — ENOXAPARIN SODIUM 40 MG: 40 INJECTION SUBCUTANEOUS at 14:55

## 2020-01-24 RX ADMIN — ATORVASTATIN CALCIUM 80 MG: 80 TABLET, FILM COATED ORAL at 21:35

## 2020-01-24 RX ADMIN — LISINOPRIL 40 MG: 20 TABLET ORAL at 08:48

## 2020-01-24 RX ADMIN — ASPIRIN 81 MG 81 MG: 81 TABLET ORAL at 08:48

## 2020-01-24 NOTE — PROGRESS NOTES
01/24/20 0709   NIH Stroke Scale   Interval Other (comment)   LOC 0   LOC Questions 0   LOC Commands 0   Best Gaze 0   Visual 0   Facial Palsy 1   Motor Right Arm 0   Motor Left Arm 2   Motor Right Leg 0   Motor Left Leg 2   Limb Ataxia 0   Sensory 0   Best Language 1   Dysarthria 1   Extinction and Inattention 0   Total 7   dual nih with al RN

## 2020-01-24 NOTE — PROGRESS NOTES
Progress Note    2020  Admit Date: 2019  9:07 AM   NAME: Dio Pavon   :  1975   MRN:  [id number]   Attending: Ashleigh Cano MD  PCP:  Ligia Acuña MD  Treatment Team: Attending Provider: Ashleigh Cano MD; Care Manager: Elvie Marie, RN; Care Manager: Mavis Torre, [facility] [place]; Consulting Provider: Lázaro Fu MD; Utilization Review: Bonnie Ramos RN; Nurse Practitioner: Doris Humphrey NP; Speech Language Pathologist: Karine Kemp Nurse: Norma Hernandez; Physical Therapy Assistant: Cesar Goldstein PTA    Full Code     This is a 52 yo male with PMH of DM II, HTN who presented 19 with c/o left leg and arm weakness, left facial droop and dysarthria. Code S called. MRI with acute infarctions in left cerebellar hemisphere, deep frontoparietal white matter and right paramedian yariel. Also noted to have old lacunar infarcts. CTA without large vessel occlusions, however some stenosis noted. Echo showed PFO, Cardiology to f/u outpatient for evaluation for closure. Neurology consulted. Started on ASA, statin. Pt is uninsured, difficult placement, case management working on plan. SUBJECTIVE:    Pt still has slurred speech and weakness in the left upper and lower extremities, although improving gradually. No fever. No tingling, no numbness,     Past Medical History:   Diagnosis Date    Acute ischemic stroke ([facility] 2019    Acute pancreatitis 2014    Cerebrovascular accident (CVA) due to embolism ([facility] 2019    Diabetes ([facility] 2002    Diabetes ([facility]     Diabetes mellitus     Hypertension      No results found for this or any previous visit (from the past 25 hour(s)).   No Known Allergies  Current Facility-Administered Medications   Medication Dose Route Frequency Provider Last Rate Last Dose    metFORMIN (GLUCOPHAGE) tablet 500 mg  500 mg Oral BID WITH MEALS Kailash Bishop MD   500 mg at 20 0848    metoprolol succinate (TOPROL-XL) XL tablet 25 mg  25 mg Oral DAILY Richard Awad MD   25 mg at 20 0848    polyethylene glycol (MIRALAX) packet 17 g  17 g Oral DAILY PRN Celestina TARIQ DO   17 g at 20 9965    guaiFENesin (ROBITUSSIN) 100 mg/5 mL oral liquid 100 mg  100 mg Oral Q4H PRN Richard Awad MD   100 mg at 20 0849    hydrALAZINE (APRESOLINE) 20 mg/mL injection 20 mg  20 mg IntraVENous Q4H PRN Bailey Nguyen MD   20 mg at 19 0133    atorvastatin (LIPITOR) tablet 80 mg  80 mg Oral QHS Bailey Nguyen MD   80 mg at 20 2138    lisinopril (PRINIVIL, ZESTRIL) tablet 40 mg  40 mg Oral DAILY Bailey Nguyen MD   40 mg at 20 0848    sodium chloride (NS) flush 5-40 mL  5-40 mL IntraVENous PRN Bailey Nguyen MD   5 mL at 01/15/20 1529    ondansetron (ZOFRAN) injection 4 mg  4 mg IntraVENous Q4H PRN Bailey Nguyen MD        aspirin chewable tablet 81 mg  81 mg Oral DAILY Bailey Nguyen MD   81 mg at 20 0848    acetaminophen (TYLENOL) tablet 650 mg  650 mg Oral Q4H PRN Bailey Nguyen MD   650 mg at 20 0848    enoxaparin (LOVENOX) injection 40 mg  40 mg SubCUTAneous Q24H Bailey Nguyen MD   40 mg at 20 1426    dextrose 40% (GLUTOSE) oral gel 1 Tube  15 g Oral PRN Bailey Nguyen MD        glucagon [facility]) injection 1 mg  1 mg IntraMUSCular PRN Bailey Nguyen MD        dextrose (D50W) injection syrg 12.5-25 g  25-50 mL IntraVENous PRN Bailey Nguyen MD           Review of Systems negative with exception of pertinent positives noted above  PHYSICAL EXAM     Visit Vitals  /77 (BP 1 Location: Right arm, BP Patient Position: Sitting)   Pulse 72   Temp 98.6 °F (37 °C)   Resp 20   Ht 5' 6\" (1.676 m)   Wt 95.3 kg (210 lb)   SpO2 99%   BMI 33.89 kg/m²      Temp (24hrs), Av.2 °F (36.8 °C), Min:97.7 °F (36.5 °C), Max:98.6 °F (37 °C)    Oxygen Therapy  O2 Sat (%): 99 % (20 1200)  Pulse via Oximetry: 56 beats per minute (01/05/20 1918)  O2 Device: Room air (01/05/20 1930)    Intake/Output Summary (Last 24 hours) at 1/24/2020 1444  Last data filed at 1/23/2020 1734  Gross per 24 hour   Intake 240 ml   Output    Net 240 ml      General: No acute distress    Lungs: CTA bilaterally. Resp even and unlabored  Heart:  S1S2 present without murmurs rubs gallops. RRR. No LE edema  Abdomen: Soft, non tender, non distended. BS present  Extremities: No cyanosis. Left UE/LE flaccid  Neurologic:  A/O X3. Dysarthria noted. Left facial droop. Sensation intact. LUE/LLE strength 2/5. Results summary of Diagnostic Studies/Procedures copied from within [facility] EMR:        Edmar Dixon 96 Problems    Diagnosis Date Noted    PFO (patent foramen ovale) 12/17/2019    Arrhythmia 12/15/2019    Hyperlipemia 30/38/8417    Acute embolic stroke ([facility] 12/99/0787    Type 2 diabetes mellitus (HCC)     Hypertension      Plan:    -Acute embolic stroke: POA  Dysphagia   MRI head: with acute infarcts in L cerebellar hemisphere, deep frontoparietal white matter, and R paramedian yariel.  Also noted old lacunar infarcts. ISMAEL: 3 mm PFO, needs to f/u outpatient with Cardiology for evaluation for closure   Cont statin, ASA  PT/OT/Speech     -Hypertension: continue home meds      -Type 2 diabetes mellitus:  Metformin     -Arrhythmia: had brief wide complex tachycardia early in admission, will need event monitor as outpt per cards    DVT Prophylaxis: lovenox    Difficult placement, case management working on plan. Patient has no medical insurance. Pt can't go home as pt's wife is disabled and is not safe at home without supervision as pt needs long term care.       Dasia Rodriguez MD

## 2020-01-24 NOTE — PROGRESS NOTES
Problem: Patient Education: Go to Patient Education Activity  Goal: Patient/Family Education  Outcome: Progressing Towards Goal     Problem: Pressure Injury - Risk of  Goal: *Prevention of pressure injury  Description  Document Dewey Scale and appropriate interventions in the flowsheet. Outcome: Progressing Towards Goal  Note: Pressure Injury Interventions:  Sensory Interventions: Assess changes in LOC, Discuss PT/OT consult with provider, Float heels, Keep linens dry and wrinkle-free, Maintain/enhance activity level, Minimize linen layers, Pressure redistribution bed/mattress (bed type), Turn and reposition approx. every two hours (pillows and wedges if needed)    Moisture Interventions: Contain wound drainage, Limit adult briefs, Minimize layers    Activity Interventions: Increase time out of bed, Pressure redistribution bed/mattress(bed type), PT/OT evaluation    Mobility Interventions: HOB 30 degrees or less, Pressure redistribution bed/mattress (bed type), PT/OT evaluation    Nutrition Interventions: Document food/fluid/supplement intake    Friction and Shear Interventions: Apply protective barrier, creams and emollients                Problem: Patient Education: Go to Patient Education Activity  Goal: Patient/Family Education  Outcome: Progressing Towards Goal     Problem: Falls - Risk of  Goal: *Absence of Falls  Description  Document Jeanne Fall Risk and appropriate interventions in the flowsheet.   Outcome: Progressing Towards Goal  Note: Fall Risk Interventions:  Mobility Interventions: Bed/chair exit alarm, Communicate number of staff needed for ambulation/transfer, OT consult for ADLs, Patient to call before getting OOB, PT Consult for mobility concerns    Mentation Interventions: Bed/chair exit alarm, Adequate sleep, hydration, pain control, Door open when patient unattended, Increase mobility, More frequent rounding, Reorient patient    Medication Interventions: Patient to call before getting OOB, Teach patient to arise slowly, Bed/chair exit alarm    Elimination Interventions: Bed/chair exit alarm, Call light in reach, Patient to call for help with toileting needs, Stay With Me (per policy), Toilet paper/wipes in reach, Toileting schedule/hourly rounds    History of Falls Interventions: Bed/chair exit alarm, Door open when patient unattended, Investigate reason for fall         Problem: Patient Education: Go to Patient Education Activity  Goal: Patient/Family Education  Outcome: Progressing Towards Goal     Problem: Diabetes Self-Management  Goal: *Disease process and treatment process  Description  Define diabetes and identify own type of diabetes; list 3 options for treating diabetes. Outcome: Progressing Towards Goal  Goal: *Incorporating nutritional management into lifestyle  Description  Describe effect of type, amount and timing of food on blood glucose; list 3 methods for planning meals. Outcome: Progressing Towards Goal  Goal: *Incorporating physical activity into lifestyle  Description  State effect of exercise on blood glucose levels. Outcome: Progressing Towards Goal  Goal: *Developing strategies to promote health/change behavior  Description  Define the ABC's of diabetes; identify appropriate screenings, schedule and personal plan for screenings. Outcome: Progressing Towards Goal  Goal: *Using medications safely  Description  State effect of diabetes medications on diabetes; name diabetes medication taking, action and side effects. Outcome: Progressing Towards Goal  Goal: *Monitoring blood glucose, interpreting and using results  Description  Identify recommended blood glucose targets  and personal targets. Outcome: Progressing Towards Goal  Goal: *Prevention, detection, treatment of acute complications  Description  List symptoms of hyper- and hypoglycemia; describe how to treat low blood sugar and actions for lowering  high blood glucose level.   Outcome: Progressing Towards Goal  Goal: *Prevention, detection and treatment of chronic complications  Description  Define the natural course of diabetes and describe the relationship of blood glucose levels to long term complications of diabetes.   Outcome: Progressing Towards Goal  Goal: *Developing strategies to address psychosocial issues  Description  Describe feelings about living with diabetes; identify support needed and support network  Outcome: Progressing Towards Goal     Problem: Patient Education: Go to Patient Education Activity  Goal: Patient/Family Education  Outcome: Progressing Towards Goal     Problem: Patient Education: Go to Patient Education Activity  Goal: Patient/Family Education  Outcome: Progressing Towards Goal     Problem: Nutrition Deficit  Goal: *Optimize nutritional status  Outcome: Progressing Towards Goal     Problem: Patient Education: Go to Patient Education Activity  Goal: Patient/Family Education  Outcome: Progressing Towards Goal     Problem: General Medical Care Plan  Goal: *Vital signs within specified parameters  Outcome: Progressing Towards Goal  Goal: *Labs within defined limits  Outcome: Progressing Towards Goal  Goal: *Absence of infection signs and symptoms  Description  Wash hand more often   Outcome: Progressing Towards Goal  Goal: *Optimal pain control at patient's stated goal  Outcome: Progressing Towards Goal  Goal: *Skin integrity maintained  Outcome: Progressing Towards Goal  Goal: *Fluid volume balance  Outcome: Progressing Towards Goal  Goal: *Optimize nutritional status  Outcome: Progressing Towards Goal  Goal: *Anxiety reduced or absent  Outcome: Progressing Towards Goal  Goal: *Progressive mobility and function (eg: ADL's)  Outcome: Progressing Towards Goal     Problem: Patient Education: Go to Patient Education Activity  Goal: Patient/Family Education  Outcome: Progressing Towards Goal     Problem: Patient Education: Go to Patient Education Activity  Goal: Patient/Family Education  Outcome: Progressing Towards Goal     Problem: Patient Education: Go to Patient Education Activity  Goal: Patient/Family Education  Outcome: Progressing Towards Goal     Problem: Patient Education: Go to Patient Education Activity  Goal: Patient/Family Education  Outcome: Progressing Towards Goal     Problem: Ischemic Stroke: Discharge Outcomes  Goal: *Verbalizes anxiety and depression are reduced or absent  Outcome: Progressing Towards Goal  Goal: *Verbalize understanding of risk factor modification(Stroke Metric)  Outcome: Progressing Towards Goal  Goal: *Hemodynamically stable  Outcome: Progressing Towards Goal  Goal: *Absence of aspiration pneumonia  Outcome: Progressing Towards Goal  Goal: *Aware of needed dietary changes  Outcome: Progressing Towards Goal  Goal: *Verbalize understanding of prescribed medications including anti-coagulants, anti-lipid, and/or anti-platelets(Stroke Metric)  Outcome: Progressing Towards Goal  Goal: *Tolerating diet  Outcome: Progressing Towards Goal  Goal: *Aware of follow-up diagnostics related to anticoagulants  Outcome: Progressing Towards Goal  Goal: *Ability to perform ADLs and demonstrates progressive mobility and function  Outcome: Progressing Towards Goal  Goal: *Absence of DVT(Stroke Metric)  Outcome: Progressing Towards Goal  Goal: *Absence of aspiration  Outcome: Progressing Towards Goal  Goal: *Optimal pain control at patient's stated goal  Outcome: Progressing Towards Goal  Goal: *Home safety concerns addressed  Outcome: Progressing Towards Goal  Goal: *Describes available resources and support systems  Outcome: Progressing Towards Goal  Goal: *Verbalizes understanding of activation of EMS(911) for stroke symptoms(Stroke Metric)  Outcome: Progressing Towards Goal  Goal: *Understands and describes signs and symptoms to report to providers(Stroke Metric)  Outcome: Progressing Towards Goal  Goal: *Neurolgocially stable (absence of additional neurological deficits)  Outcome: Progressing Towards Goal  Goal: *Verbalizes importance of follow-up with primary care physician(Stroke Metric)  Outcome: Progressing Towards Goal  Goal: *Smoking cessation discussed,if applicable(Stroke Metric)  Outcome: Progressing Towards Goal  Goal: *Depression screening completed(Stroke Metric)  Outcome: Progressing Towards Goal     Problem: Pain  Goal: *Control of Pain  Outcome: Progressing Towards Goal     Problem: Patient Education: Go to Patient Education Activity  Goal: Patient/Family Education  Outcome: Progressing Towards Goal

## 2020-01-24 NOTE — PROGRESS NOTES
Problem: Mobility Impaired (Adult and Pediatric)  Goal: *Acute Goals and Plan of Care  Description  Acute PT Goals (reviewed & updated 1/17/2020):  STG:  (1.) Mr. Aristeo Rascon will move from supine to sit and sit to supine , scoot up and down and roll side to side with CONTACT GUARD ASSIST within 3 treatment day(s). (2.) Mr. Aristeo Rascon will transfer from bed to chair and chair to bed with MODERATE ASSIST x1 using the least restrictive device within 3 treatment day(s). (3.) Mr. Aristeo Rascon will perform standing static and dynamic balance activities x 10 minutes with MODERATE ASSIST x1 to improve safety within 3 treatment day(s). LTG:  (1.) Mr. Aristeo Rascon will move from supine to sit and sit to supine , scoot up and down and roll side to side with STAND BY ASSIST within 7 treatment day(s). (2.) Mr. Aristeo Rascon will transfer from bed to chair and chair to bed with MINIMAL ASSIST using the least restrictive device within 7 treatment day(s). (3.) Mr. Aristeo Rascon will ambulate with MODERATE ASSIST for 20+ feet with the least restrictive device within 7 treatment day(s). (4.) Mr. Aristeo Rascon will perform standing static and dynamic balance activities x 20 minutes with MINIMAL ASSIST to improve safety within 7 treatment day(s). (5.) Mr. Aristeo Rascon will perform bilateral lower extremity exercises x 15 min for HEP with SUPERVISION to improve strength, endurance, and functional mobility within 7 treatment day(s).      PHYSICAL THERAPY: Daily Note and AM 1/24/2020  INPATIENT: PT Visit Days : 4  Payor: MEDICAID PENDING / Plan: Tulio Carpio PENDING / Product Type: Medicaid /       NAME/AGE/GENDER: Joe Silveira is a 53 y.o. male   PRIMARY DIAGNOSIS: Acute ischemic stroke ([address] [I63.9]  Cerebrovascular accident (CVA) due to embolism ([address] [S18.9] Acute embolic stroke ([address] Acute embolic stroke ([address]       ICD-10: Treatment Diagnosis:   · Other lack of cordination (R27.8)  · Difficulty in walking, Not elsewhere classified (R26.2)  · Other abnormalities of gait and mobility (R26.89)  · Unspecified Lack of Coordination (R27.9)  · Hemiplegia and hemiparesis following cerebral infarction affecting   · left non-dominant side (Y96.617)   Precaution/Allergies:  Patient has no known allergies. ASSESSMENT:     Mr. Annie Sykes is a 53year old admitted with acute CVA with left hemiparesis and left inattention. This morning, pt presents supine in bed and is agreeable to therapy. Pt performed supine to sit with mod assist.  Pt performed below LE exercises with assist to complete L LE. Pt then stood with min assist x 2 and was able to ambulate about 4' using luke walker and min/mod assist x 2. Pt was advancing his L LE better today and leaning onto the luke walker much more today. Pt does fatigue quickly but is making good progress with ambulation and overall mobility. Pt left up in chair with wife attending and needs in reach. Will continue with POC. This section established at most recent assessment   PROBLEM LIST (Impairments causing functional limitations):  1. Decreased Strength  2. Decreased ADL/Functional Activities  3. Decreased Transfer Abilities  4. Decreased Ambulation Ability/Technique  5. Decreased Balance  6. Increased Pain  7. Decreased Activity Tolerance  8. Increased Fatigue  9. Decreased Flexibility/Joint Mobility   INTERVENTIONS PLANNED: (Benefits and precautions of physical therapy have been discussed with the patient.)  1. Balance Exercise  2. Bed Mobility  3. Family Education  4. Gait Training  5. Home Exercise Program (HEP)  6. Manual Therapy  7. Neuromuscular Re-education/Strengthening  8. Range of Motion (ROM)  9. Therapeutic Activites  10. Therapeutic Exercise/Strengthening  11. Transfer Training     TREATMENT PLAN: Frequency/Duration: 3 times a week for duration of hospital stay  Rehabilitation Potential For Stated Goals: Good     REHAB RECOMMENDATIONS (at time of discharge pending progress):    Placement:   It is my opinion, based on this patient's performance to date, that Mr. Joanne Beach may benefit from intensive therapy at an [facility] after discharge due to a probable need for 24 hour rehab nursing, a probable need for multiple therapy disciplines, and potential to make ongoing and sustainable functional improvement that is of practical value. .  Equipment:    TBD pending progress with therapy. HISTORY:   History of Present Injury/Illness (Reason for Referral):  Per H&P: \"Pt is a 54 y/o smoker with DM, HTN, who presented to ER with L leg and arm weakness, L facial droop, dysarthria. First noted L leg weakness late night 12/11 when he woke up to go to the bathroom. He was normal when he went to bed around 1030. Woke up this morning and had persistent weakness L leg and also now noted in L arm. EMS called. Noted to have slurred speech and L facial droop as well. Code S called in ER around 9am.  MRI with acute infarcts in L cerebellar hemisphere, deep frontoparietal white matter, and R paramedian yariel. Also noted old lacunar infarcts. No large vessel occlusion on CTA, but some stenosis noted. No hx afib, TIA, CVA. No CP, palpitations, SOB. \"  Past Medical History/Comorbidities:   Mr. Joanne Beach  has a past medical history of Acute ischemic stroke ([address] (12/12/2019), Acute pancreatitis (11/19/2014), Cerebrovascular accident (CVA) due to embolism ([address] (12/12/2019), Diabetes ([address] (2002), Diabetes ([address] Diabetes mellitus, and Hypertension. He also has no past medical history of Arthritis, Asthma, Autoimmune disease ([address] CAD (coronary artery disease), Cancer ([address] Chronic kidney disease, COPD, Dementia, Dementia ([address] Heart failure ([address] Ill-defined condition, Infectious disease, Liver disease, Other ill-defined conditions(799.89), Psychiatric disorder, PUD (peptic ulcer disease), Seizures ([address] or Sleep disorder.   Mr. Joanne Beach  has a past surgical history that includes hx hernia repair and hx orthopaedic. Social History/Living Environment:   Home Environment: Apartment  # Steps to Enter: 12  Rails to Enter: Yes  [employer] : Bilateral  One/Two Story Residence: One story  Living Alone: No  Support Systems: Spouse/Significant Other/Partner  Patient Expects to be Discharged to[de-identified] Unknown  Current DME Used/Available at Home: None  Tub or Shower Type: Tub/Shower combination  Prior Level of Function/Work/Activity:  Independent, lives with wife in 2nd story 1 level apartment. No recent falls. Number of Personal Factors/Comorbidities that affect the Plan of Care: 1-2: MODERATE COMPLEXITY   EXAMINATION:   Most Recent Physical Functioning:   Gross Assessment:                  Posture:     Balance:  Sitting - Static: Good (unsupported)(-)  Sitting - Dynamic: Fair (occasional)  Standing - Static: Fair;Constant support  Standing - Dynamic : Poor;Fair;Constant support Bed Mobility:  Supine to Sit: Moderate assistance  Scooting: Moderate assistance  Wheelchair Mobility:     Transfers:  Sit to Stand: Minimum assistance;Assist x2  Stand to Sit: Minimum assistance; Moderate assistance  Gait:     Base of Support: Center of gravity altered;Narrowed  Speed/Clotilde: Delayed; Shuffled; Slow  Step Length: Left shortened;Right shortened  Gait Abnormalities: Decreased step clearance; Hemiplegic  Distance (ft): 4 Feet (ft)  Assistive Device: Walker luke  Ambulation - Level of Assistance: Minimal assistance; Moderate assistance;Assist x2      Body Structures Involved:  1. Nerves  2. Voice/Speech  3. Bones  4. Joints  5. Muscles Body Functions Affected:  1. Mental  2. Sensory/Pain  3. Neuromusculoskeletal  4. Movement Related Activities and Participation Affected:  1. General Tasks and Demands  2. Mobility  3. Self Care  4.  Interpersonal Interactions and Relationships   Number of elements that affect the Plan of Care: 4+: HIGH COMPLEXITY   CLINICAL PRESENTATION:   Presentation: Evolving clinical presentation with changing clinical characteristics: MODERATE COMPLEXITY   CLINICAL DECISION MAKIN[address] [place] [id number] AM-PAC 6 Clicks   Basic Mobility Inpatient Short Form  How much difficulty does the patient currently have. .. Unable A Lot A Little None   1. Turning over in bed (including adjusting bedclothes, sheets and blankets)? [] 1   [] 2   [x] 3   [] 4   2. Sitting down on and standing up from a chair with arms ( e.g., wheelchair, bedside commode, etc.)   [] 1   [x] 2   [] 3   [] 4   3. Moving from lying on back to sitting on the side of the bed? [] 1   [] 2   [x] 3   [] 4   How much help from another person does the patient currently need. .. Total A Lot A Little None   4. Moving to and from a bed to a chair (including a wheelchair)? [] 1   [x] 2   [] 3   [] 4   5. Need to walk in hospital room? [] 1   [x] 2   [] 3   [] 4   6. Climbing 3-5 steps with a railing? [] 1   [x] 2   [] 3   [] 4   © , Trustees of [address] [place] [id number], under license to [employer]. All rights reserved      Score:  Initial: 13 Most Recent: 14 (Date:2020)    Interpretation of Tool:  Represents activities that are increasingly more difficult (i.e. Bed mobility, Transfers, Gait). Medical Necessity:     · Patient is expected to demonstrate progress in   · strength, range of motion, balance, coordination, and functional technique  ·  to   · increase independence with all mobility. · .  Reason for Services/Other Comments:  · Patient continues to require skilled intervention due to   · medical complications and mobility deficits which impact his level of function, safety, and independence as indicated above. · .   Use of outcome tool(s) and clinical judgement create a POC that gives a: Questionable prediction of patient's progress: MODERATE COMPLEXITY        TREATMENT:      Pre-treatment Symptoms/Complaints:  Pleasant and agreeable to therapy. Pain: Initial:   6/10 L side, RN notified.  Post Session:  Resting comfortably in bed Therapeutic Activity: (    14 minutes): Therapeutic activities including Bed transfers, Chair transfers, Ambulation on level ground and sit to stand transfers to improve mobility, strength, balance and coordination. Required moderate   to promote dynamic balance in standing and promote coordination of bilateral, lower extremity(s). Therapeutic Exercise: (  10 minutes):  Exercises per grid below to improve mobility and strength. Required moderate visual, verbal and manual cues to promote proper body posture and promote proper body mechanics. Progressed range and repetitions as indicated. Date:  1/21/20 Date:  1/24/20 Date:     ACTIVITY/EXERCISE AM PM AM PM AM PM   Ambulation:           Distance  Device  Duration         Seated Heel Raises X 10 B, P-L  X 10 B, P-L      Seated Toe Raises X 10 B, P-L  X 10 B, P-L      Seated Long Arc Quads X 10 B, AA-L  X 10 B, AA-L      Seated Marching X 10 B, AA-L  X 10 B, AA-L      Seated knee squeezes X 15 B, AA-L  X 10 B, AA-L               B = bilateral; AA = active assistive; A = active; P = passive              Braces/Orthotics/Lines/Etc:   · O2 Device: Room Air   Treatment/Session Assessment:    · Response to Treatment:  See above  · Interdisciplinary Collaboration:   o Physical Therapy Assistant  o Registered Nurse  o Rehabilitation Attendant  o Certified Nursing Assistant/Patient Care Technician  · After treatment position/precautions:   o Up in chair  o Bed alarm/tab alert on  o Bed/Chair-wheels locked  o Bed in low position  o Call light within reach  o RN notified   · Compliance with Program/Exercises: Compliant all of the time  · Recommendations/Intent for next treatment session: \"Next visit will focus on advancements to more challenging activities and reduction in assistance provided\".     Total Treatment Duration:  PT Patient Time In/Time Out  Time In: 1130  Time Out: [address] Jeffrey Garrido, PTA

## 2020-01-24 NOTE — PROGRESS NOTES
Dysphagia(re evaluation 1/7/2020)  LTG: Patient will tolerate least restrictive diet without overt signs or symptoms of airway compromise. STG: Patient will tolerate mechanical soft diet(ground meats/chopped vegetables) and thin liquids by cup without overt signs or symptoms of airway compromise. STG: Patient will demonstrate use of compensatory strategies to improve swallow safety/function with 90% accuracy given minimal cueing  STG: Patient participate in exercises to improve oral motor movement with 80% accuracy given minimal cueing  STG: Patient will participate in modified barium swallow study as clinically indicated. Neurolinguistic Goals:  LTG: Patient will increase neuro-linguistic abilities to increase safety and awareness in functional living environment   STG: Patient will participate in speech/language/cognitive evaluation.  MET 12/17/19  STG: Patient will solve mathematical problems with 80%accuracy given minimal cueing   STG: Patient will name 10 items to concrete category in 1 minute given minimal cueing  STG: Patient will participate in verbal reasoning tasks with 80% accuracy given minimal cueing  STG: Patient will complete mental manipulation tasks with 80% accuracy given minimal cueing  STG: Patient will complete working memory/attention tasks with 80% accuracy given minimal cueing  STG: Patient will recall relevant verbal information with 80% accuracy with minimal assistance  STG: Patient will utilize memory compensatory strategies to improve recall of functional list/message with 90%accuracy given minimal assistance  STG: Patient will participate in ongoing cognitive linguistic evaluation for therapeutic assessment     SPEECH LANGUAGE PATHOLOGY: DYSPHAGIA and SPEECH/LANGUAGE- Daily Note 7    NAME/AGE/GENDER: Ryne Loera is a 53 y.o. male  DATE: 1/24/2020  PRIMARY DIAGNOSIS: Acute ischemic stroke ([facility] [I63.9]  Cerebrovascular accident (CVA) due to embolism ([facility] [I63.9]      ICD-10: Treatment Diagnosis: R13.12 Dysphagia, Oropharyngeal Phase    INTERDISCIPLINARY COLLABORATION: Registered Nurse  PRECAUTIONS/ALLERGIES: Patient has no known allergies. SUBJECTIVE   Watching TV upon entry. Agreeable to participate. Orientation:   Person  Place  Time  Situation    Pain: Pain Scale 1: Numeric (0 - 10)  Pain Intensity 1: 0     OBJECTIVE   Swallowing:   Not addressed during session          Speech/cognitive linguistic treatment:   Memory:   Rosamaria- Immediate: 5/6  Delayed (5 minute) with use of strategies: 6/6  Lunch- 3/4       Category exclusion (1:5) 2/4 independently; 4/4 with repetition      ASSESSMENT   Swallowing: Not addressed. Continue current diet: mechanical soft diet/thin liquids by cup only. No straws. Speech/Cognitive Function: ongoing dysarthria and cognitive linguistic impairments. Reviewed compensatory memory strategies and practiced utilizing strategies to recall functional rosamaria. Patient with improved carryover of dysarthria strategies. Recommend ongoing skilled intervention to improve swallow/function strength and cognitive linguistic abilities.           Tool Used: Dysphagia Outcome and Severity Scale (ROCHELLE)    Score Comments   Normal Diet  [] 7 With no strategies or extra time needed   Functional Swallow  [] 6 May have mild oral or pharyngeal delay   Mild Dysphagia  [] 5 Which may require one diet consistency restricted    Mild-Moderate Dysphagia  [] 4 With 1-2 diet consistencies restricted   Moderate Dysphagia  [] 3 With 2 or more diet consistencies restricted   Moderate-Severe Dysphagia  [] 2 With partial PO strategies (trials with ST only)   Severe Dysphagia  [] 1 With inability to tolerate any PO safely      Score:  Initial:4 Most Recent: 4 (Date 01/24/20 )   Interpretation of Tool: The Dysphagia Outcome and Severity Scale (ROCHELLE) is a simple, easy-to-use, 7-point scale developed to systematically rate the functional severity of dysphagia based on objective assessment and make recommendations for diet level, independence level, and type of nutrition. PLAN    FREQUENCY/DURATION: Continue to follow patient 3 times a week for duration of hospital stay to address above goals. - Recommendations for next treatment session: Next treatment will address dysarthria, dysphagia, cognition     REHABILITATION POTENTIAL FOR STATED GOALS: Good     COMPLIANCE WITH PROGRAM/EXERCISES: Will assess as treatment progresses    CONTINUATION OF SKILLED SERVICES/MEDICAL NECESSITY:   Patient is expected to demonstrate progress in  swallow strength, swallow timeliness, swallow function, diet tolerance and swallow safety in order to  improve swallow safety, work toward diet advancement and decrease aspiration risk.  Patient continues to require skilled intervention due to dysphagia. RECOMMENDATIONS   DIET:    PO:  Mechanical soft with ground meat/chopped vegatables   Liquids:  regular thin by cup only    MEDICATIONS: Crushed in puree     ASPIRATION PRECAUTIONS  · Slow rate of intake  · Small bites/sips  · Upright at 90 degrees during meal     COMPENSATORY STRATEGIES/MODIFICATIONS  · Alternate liquids/solids  · Small sips and bites  · Slow rate  · NO STRAWS   EDUCATION:  · Patient     RECOMMENDATIONS for CONTINUED SPEECH THERAPY: YES: Anticipate need for ongoing speech therapy during this hospitalization and at next level of care.          SAFETY:  After treatment position/precautions:  · Upright in bed  · Call light within reach    Total Treatment Duration:   Time In: 1520  Time Out: [address] [id number] [facility]

## 2020-01-24 NOTE — PROGRESS NOTES
Problem: Mobility Impaired (Adult and Pediatric)  Goal: *Acute Goals and Plan of Care  Description  Acute PT Goals (reviewed & updated 1/17/2020):  STG:  (1.) Mr. Alondra Altamirano will move from supine to sit and sit to supine , scoot up and down and roll side to side with CONTACT GUARD ASSIST within 3 treatment day(s). (2.) Mr. Alondra Altamirano will transfer from bed to chair and chair to bed with MODERATE ASSIST x1 using the least restrictive device within 3 treatment day(s). (3.) Mr. Alondra Altamirano will perform standing static and dynamic balance activities x 10 minutes with MODERATE ASSIST x1 to improve safety within 3 treatment day(s). LTG:  (1.) Mr. Alondra Altamirano will move from supine to sit and sit to supine , scoot up and down and roll side to side with STAND BY ASSIST within 7 treatment day(s). (2.) Mr. Alondra Altamirano will transfer from bed to chair and chair to bed with MINIMAL ASSIST using the least restrictive device within 7 treatment day(s). (3.) Mr. Alondra Altamirano will ambulate with MODERATE ASSIST for 20+ feet with the least restrictive device within 7 treatment day(s). (4.) Mr. Alondra Altamirano will perform standing static and dynamic balance activities x 20 minutes with MINIMAL ASSIST to improve safety within 7 treatment day(s). (5.) Mr. Alondra Altamirano will perform bilateral lower extremity exercises x 15 min for HEP with SUPERVISION to improve strength, endurance, and functional mobility within 7 treatment day(s).      PHYSICAL THERAPY: Daily Note and PM 1/24/2020  INPATIENT: PT Visit Days : 4  Payor: MEDICAID PENDING / Plan: [place] PENDING / Product Type: Medicaid /       NAME/AGE/GENDER: Chel Wood is a 53 y.o. male   PRIMARY DIAGNOSIS: Acute ischemic stroke ([address] [I63.9]  Cerebrovascular accident (CVA) due to embolism ([address] [A31.6] Acute embolic stroke ([address] Acute embolic stroke ([address]       ICD-10: Treatment Diagnosis:   · Other lack of cordination (R27.8)  · Difficulty in walking, Not elsewhere classified (R26.2)  · Other abnormalities of gait and mobility (R26.89)  · Unspecified Lack of Coordination (R27.9)  · Hemiplegia and hemiparesis following cerebral infarction affecting   · left non-dominant side (L66.913)   Precaution/Allergies:  Patient has no known allergies. ASSESSMENT:     Mr. Breanna Colbert is a 53year old admitted with acute CVA with left hemiparesis and left inattention. This morning, pt presents supine in bed and is agreeable to therapy. Pt performed supine to sit with mod assist.  Pt performed below LE exercises with assist to complete L LE. Pt then stood with min assist x 2 and was able to ambulate about 4' using luke walker and min/mod assist x 2. Pt was advancing his L LE better today and leaning onto the luke walker much more today. Pt does fatigue quickly but is making good progress with ambulation and overall mobility. Pt left up in chair with wife attending and needs in reach. Will continue with POC. PM - pt up in chair and ready to get back to bed. Pt stood and took a few steps to bed with luke walker and min/mod assist x 2. Pt returned supine with min assist.  Pt is making good progress with mobility. Will continue with POC. This section established at most recent assessment   PROBLEM LIST (Impairments causing functional limitations):  1. Decreased Strength  2. Decreased ADL/Functional Activities  3. Decreased Transfer Abilities  4. Decreased Ambulation Ability/Technique  5. Decreased Balance  6. Increased Pain  7. Decreased Activity Tolerance  8. Increased Fatigue  9. Decreased Flexibility/Joint Mobility   INTERVENTIONS PLANNED: (Benefits and precautions of physical therapy have been discussed with the patient.)  1. Balance Exercise  2. Bed Mobility  3. Family Education  4. Gait Training  5. Home Exercise Program (HEP)  6. Manual Therapy  7. Neuromuscular Re-education/Strengthening  8. Range of Motion (ROM)  9. Therapeutic Activites  10. Therapeutic Exercise/Strengthening  11.  Transfer Training     TREATMENT PLAN: Frequency/Duration: 3 times a week for duration of hospital stay  Rehabilitation Potential For Stated Goals: Good     REHAB RECOMMENDATIONS (at time of discharge pending progress):    Placement: It is my opinion, based on this patient's performance to date, that Mr. Krys Grimaldo may benefit from intensive therapy at an [address] [place] [address] after discharge due to a probable need for 24 hour rehab nursing, a probable need for multiple therapy disciplines, and potential to make ongoing and sustainable functional improvement that is of practical value. .  Equipment:    TBD pending progress with therapy. HISTORY:   History of Present Injury/Illness (Reason for Referral):  Per H&P: \"Pt is a 54 y/o smoker with DM, HTN, who presented to ER with L leg and arm weakness, L facial droop, dysarthria. First noted L leg weakness late night 12/11 when he woke up to go to the bathroom. He was normal when he went to bed around 1030. Woke up this morning and had persistent weakness L leg and also now noted in L arm. EMS called. Noted to have slurred speech and L facial droop as well. Code S called in ER around 9am.  MRI with acute infarcts in L cerebellar hemisphere, deep frontoparietal white matter, and R paramedian yariel. Also noted old lacunar infarcts. No large vessel occlusion on CTA, but some stenosis noted. No hx afib, TIA, CVA. No CP, palpitations, SOB. \"  Past Medical History/Comorbidities:   Mr. Krys Grimaldo  has a past medical history of Acute ischemic stroke ([address] (12/12/2019), Acute pancreatitis (11/19/2014), Cerebrovascular accident (CVA) due to embolism ([address] (12/12/2019), Diabetes ([address] (2002), Diabetes ([address] Diabetes mellitus, and Hypertension.  He also has no past medical history of Arthritis, Asthma, Autoimmune disease ([address] CAD (coronary artery disease), Cancer ([address] Chronic kidney disease, COPD, Dementia, Dementia ([address] Heart failure ([address] Ill-defined condition, Infectious disease, Liver disease, Other ill-defined conditions(799.89), Psychiatric disorder, PUD (peptic ulcer disease), Seizures ([facility] or Sleep disorder. Mr. Rui Courtney  has a past surgical history that includes hx hernia repair and hx orthopaedic. Social History/Living Environment:   Home Environment: Apartment  # Steps to Enter: 12  Rails to Enter: Yes  [employer] : Bilateral  One/Two Story Residence: One story  Living Alone: No  Support Systems: Spouse/Significant Other/Partner  Patient Expects to be Discharged to[de-identified] Unknown  Current DME Used/Available at Home: None  Tub or Shower Type: Tub/Shower combination  Prior Level of Function/Work/Activity:  Independent, lives with wife in 2nd story 1 level apartment. No recent falls. Number of Personal Factors/Comorbidities that affect the Plan of Care: 1-2: MODERATE COMPLEXITY   EXAMINATION:   Most Recent Physical Functioning:   Gross Assessment:                  Posture:     Balance:  Sitting - Static: Good (unsupported)(-)  Sitting - Dynamic: Fair (occasional)  Standing - Static: Fair;Constant support  Standing - Dynamic : Poor;Fair;Constant support Bed Mobility:  Supine to Sit: Moderate assistance  Scooting: Moderate assistance  Wheelchair Mobility:     Transfers:  Sit to Stand: Minimum assistance;Assist x2  Stand to Sit: Minimum assistance; Moderate assistance  Gait:     Base of Support: Center of gravity altered;Narrowed  Speed/Clotilde: Delayed; Shuffled; Slow  Step Length: Left shortened;Right shortened  Gait Abnormalities: Decreased step clearance; Hemiplegic  Distance (ft): 4 Feet (ft)  Assistive Device: Walker luke  Ambulation - Level of Assistance: Minimal assistance; Moderate assistance;Assist x2      Body Structures Involved:  1. Nerves  2. Voice/Speech  3. Bones  4. Joints  5. Muscles Body Functions Affected:  1. Mental  2. Sensory/Pain  3. Neuromusculoskeletal  4. Movement Related Activities and Participation Affected:  1.  General Tasks and Demands  2. Mobility  3. Self Care  4. Interpersonal Interactions and Relationships   Number of elements that affect the Plan of Care: 4+: HIGH COMPLEXITY   CLINICAL PRESENTATION:   Presentation: Evolving clinical presentation with changing clinical characteristics: MODERATE COMPLEXITY   CLINICAL DECISION MAKIN [facility] Inpatient Short Form  How much difficulty does the patient currently have. .. Unable A Lot A Little None   1. Turning over in bed (including adjusting bedclothes, sheets and blankets)? [] 1   [] 2   [x] 3   [] 4   2. Sitting down on and standing up from a chair with arms ( e.g., wheelchair, bedside commode, etc.)   [] 1   [x] 2   [] 3   [] 4   3. Moving from lying on back to sitting on the side of the bed? [] 1   [] 2   [x] 3   [] 4   How much help from another person does the patient currently need. .. Total A Lot A Little None   4. Moving to and from a bed to a chair (including a wheelchair)? [] 1   [x] 2   [] 3   [] 4   5. Need to walk in hospital room? [] 1   [x] 2   [] 3   [] 4   6. Climbing 3-5 steps with a railing? [] 1   [x] 2   [] 3   [] 4   © , Trustees of [address] [place] [address] [address] [id number], under license to [employer]. All rights reserved      Score:  Initial: 13 Most Recent: 14 (Date:2020)    Interpretation of Tool:  Represents activities that are increasingly more difficult (i.e. Bed mobility, Transfers, Gait). Medical Necessity:     · Patient is expected to demonstrate progress in   · strength, range of motion, balance, coordination, and functional technique  ·  to   · increase independence with all mobility. · .  Reason for Services/Other Comments:  · Patient continues to require skilled intervention due to   · medical complications and mobility deficits which impact his level of function, safety, and independence as indicated above.    · .   Use of outcome tool(s) and clinical judgement create a POC that gives a: Questionable prediction of patient's progress: MODERATE COMPLEXITY        TREATMENT:      Pre-treatment Symptoms/Complaints:  Pleasant and agreeable to therapy. Pain: Initial:   6/10 L side, RN notified. Post Session:  Resting comfortably in bed     Therapeutic Activity: (    9 minutes): Therapeutic activities including Bed transfers, Chair transfers, Ambulation on level ground and sit to stand transfers to improve mobility, strength, balance and coordination. Required moderate   to promote dynamic balance in standing and promote coordination of bilateral, lower extremity(s). Therapeutic Exercise: (  0 minutes):  Exercises per grid below to improve mobility and strength. Required moderate visual, verbal and manual cues to promote proper body posture and promote proper body mechanics. Progressed range and repetitions as indicated. Date:  1/21/20 Date:  1/24/20 Date:     ACTIVITY/EXERCISE AM PM AM PM AM PM   Ambulation:           Distance  Device  Duration         Seated Heel Raises X 10 B, P-L  X 10 B, P-L      Seated Toe Raises X 10 B, P-L  X 10 B, P-L      Seated Long Arc Quads X 10 B, AA-L  X 10 B, AA-L      Seated Marching X 10 B, AA-L  X 10 B, AA-L      Seated knee squeezes X 15 B, AA-L  X 10 B, AA-L               B = bilateral; AA = active assistive; A = active; P = passive              Braces/Orthotics/Lines/Etc:   · O2 Device: Room Air   Treatment/Session Assessment:    · Response to Treatment:  See above  · Interdisciplinary Collaboration:   o Physical Therapy Assistant  o Registered Nurse  o Rehabilitation Attendant  o Certified Nursing Assistant/Patient Care Technician  · After treatment position/precautions:   o Supine in bed  o Bed alarm/tab alert on  o Bed/Chair-wheels locked  o Bed in low position  o Call light within reach  o RN notified   · Compliance with Program/Exercises: Compliant all of the time  · Recommendations/Intent for next treatment session:   \"Next visit will focus on advancements to more challenging activities and reduction in assistance provided\".     Total Treatment Duration:  PT Patient Time In/Time Out  Time In: 1300  Time Out: [address], [facility]

## 2020-01-25 PROCEDURE — 74011250637 HC RX REV CODE- 250/637: Performed by: HOSPITALIST

## 2020-01-25 PROCEDURE — 74011250636 HC RX REV CODE- 250/636: Performed by: INTERNAL MEDICINE

## 2020-01-25 PROCEDURE — 65270000029 HC RM PRIVATE

## 2020-01-25 PROCEDURE — 74011250637 HC RX REV CODE- 250/637: Performed by: INTERNAL MEDICINE

## 2020-01-25 RX ADMIN — LISINOPRIL 40 MG: 20 TABLET ORAL at 08:27

## 2020-01-25 RX ADMIN — ENOXAPARIN SODIUM 40 MG: 40 INJECTION SUBCUTANEOUS at 14:42

## 2020-01-25 RX ADMIN — ASPIRIN 81 MG 81 MG: 81 TABLET ORAL at 08:27

## 2020-01-25 RX ADMIN — METFORMIN HYDROCHLORIDE 500 MG: 500 TABLET, FILM COATED ORAL at 16:56

## 2020-01-25 RX ADMIN — ACETAMINOPHEN 650 MG: 325 TABLET, FILM COATED ORAL at 08:27

## 2020-01-25 RX ADMIN — METOPROLOL SUCCINATE 25 MG: 25 TABLET, FILM COATED, EXTENDED RELEASE ORAL at 08:27

## 2020-01-25 RX ADMIN — ACETAMINOPHEN 650 MG: 325 TABLET, FILM COATED ORAL at 16:56

## 2020-01-25 RX ADMIN — ATORVASTATIN CALCIUM 80 MG: 80 TABLET, FILM COATED ORAL at 21:42

## 2020-01-25 RX ADMIN — METFORMIN HYDROCHLORIDE 500 MG: 500 TABLET, FILM COATED ORAL at 08:28

## 2020-01-25 NOTE — PROGRESS NOTES
Progress Note    2020  Admit Date: 2019  9:07 AM   NAME: Sandra Allen   :  1975   MRN:  [id number]   Attending: Sin Stewart MD  PCP:  Manuel Pitts MD  Treatment Team: Attending Provider: Sin Stewart MD; Care Manager: Cecilio Santiago, RN; Care Manager: Rebecca Valdez, [facility]; Consulting Provider: Jonnie Draper MD; Utilization Review: Chema Law RN; Nurse Practitioner: Perry Espinosa NP    Full Code     This is a 52 yo male with PMH of DM II, HTN who presented 19 with c/o left leg and arm weakness, left facial droop and dysarthria. Code S called. MRI with acute infarctions in left cerebellar hemisphere, deep frontoparietal white matter and right paramedian yariel. Also noted to have old lacunar infarcts. CTA without large vessel occlusions, however some stenosis noted. Echo showed PFO, Cardiology to f/u outpatient for evaluation for closure. Neurology consulted. Started on ASA, statin. Pt is uninsured, difficult placement, case management working on plan. SUBJECTIVE:     No fever. No tingling, no numbness,Speech slurred. Still has weakness left upper and lower extremities. Past Medical History:   Diagnosis Date    Acute ischemic stroke ([facility] 2019    Acute pancreatitis 2014    Cerebrovascular accident (CVA) due to embolism ([facility] 2019    Diabetes ([facility] 2002    Diabetes ([facility]     Diabetes mellitus     Hypertension      No results found for this or any previous visit (from the past 25 hour(s)).   No Known Allergies  Current Facility-Administered Medications   Medication Dose Route Frequency Provider Last Rate Last Dose    metFORMIN (GLUCOPHAGE) tablet 500 mg  500 mg Oral BID WITH MEALS Rey Starr MD   500 mg at 20 7617    metoprolol succinate (TOPROL-XL) XL tablet 25 mg  25 mg Oral DAILY Rey Starr MD   25 mg at 20 0827    polyethylene glycol (MIRALAX) packet 17 g  17 g Oral DAILY PRN Ashley Pavon DO   17 g at 20 0927    guaiFENesin (ROBITUSSIN) 100 mg/5 mL oral liquid 100 mg  100 mg Oral Q4H PRN Skyler Mobley MD   100 mg at 20 0849    hydrALAZINE (APRESOLINE) 20 mg/mL injection 20 mg  20 mg IntraVENous Q4H PRN Carroll Harada, MD   20 mg at 19 0133    atorvastatin (LIPITOR) tablet 80 mg  80 mg Oral QHS Carroll Harada, MD   80 mg at 20 2135    lisinopril (PRINIVIL, ZESTRIL) tablet 40 mg  40 mg Oral DAILY Carroll Harada, MD   40 mg at 20 0827    sodium chloride (NS) flush 5-40 mL  5-40 mL IntraVENous PRN Carroll Harada, MD   5 mL at 01/15/20 1529    ondansetron (ZOFRAN) injection 4 mg  4 mg IntraVENous Q4H PRN Carroll Harada, MD        aspirin chewable tablet 81 mg  81 mg Oral DAILY Carroll Harada, MD   81 mg at 20 0827    acetaminophen (TYLENOL) tablet 650 mg  650 mg Oral Q4H PRN Carroll Harada, MD   650 mg at 20 0827    enoxaparin (LOVENOX) injection 40 mg  40 mg SubCUTAneous Q24H Carroll Harada, MD   40 mg at 20 1442    dextrose 40% (GLUTOSE) oral gel 1 Tube  15 g Oral PRN Carroll Harada, MD        glucagon [facility]) injection 1 mg  1 mg IntraMUSCular PRN Carroll Harada, MD        dextrose (D50W) injection syrg 12.5-25 g  25-50 mL IntraVENous PRN Carroll Harada, MD           Review of Systems negative with exception of pertinent positives noted above  PHYSICAL EXAM     Visit Vitals  /81 (BP 1 Location: Right arm, BP Patient Position: At rest)   Pulse 78   Temp 98.1 °F (36.7 °C)   Resp 18   Ht 5' 6\" (1.676 m)   Wt 95.3 kg (210 lb)   SpO2 94%   BMI 33.89 kg/m²      Temp (24hrs), Av °F (36.7 °C), Min:97.6 °F (36.4 °C), Max:98.7 °F (37.1 °C)    Oxygen Therapy  O2 Sat (%): 94 % (20 0800)  Pulse via Oximetry: 56 beats per minute (20)  O2 Device: Room air (20)  No intake or output data in the 24 hours ending 20 1450   General: No acute distress    Lungs: CTA bilaterally. Resp even and unlabored  Heart:  S1S2 present without murmurs rubs gallops. RRR. No LE edema  Abdomen: Soft, non tender, non distended. BS present  Extremities: No cyanosis. Left UE/LE flaccid  Neurologic:  A/O X3. Dysarthria noted. Left facial droop. Sensation intact. LUE/LLE strength 2/5. Results summary of Diagnostic Studies/Procedures copied from within [facility] EMR:        [address] Problems    Diagnosis Date Noted    PFO (patent foramen ovale) 12/17/2019    Arrhythmia 12/15/2019    Hyperlipemia 32/15/8408    Acute embolic stroke ([facility] 37/99/2702    Type 2 diabetes mellitus (HCC)     Hypertension      Plan:    -Acute embolic stroke: POA  Dysphagia   MRI head: with acute infarcts in L cerebellar hemisphere, deep frontoparietal white matter, and R paramedian yariel.  Also noted old lacunar infarcts. ISMAEL: 3 mm PFO, needs to f/u outpatient with Cardiology for evaluation for closure   Cont statin, ASA  PT/OT/Speech     -Hypertension: continue home meds      -Type 2 diabetes mellitus:  Metformin     -Arrhythmia: had brief wide complex tachycardia early in admission, will need event monitor as outpt per cards    DVT Prophylaxis: lovenox    DISPO: Difficult placement, case management working on plan. Patient has no medical insurance. Pt can't go home as pt's wife is disabled and is not safe at home without supervision as pt needs long term care.       Sho Vasquez MD

## 2020-01-25 NOTE — PROGRESS NOTES
Visit with patient to build rapport with .   Calm  Encouraged with presence and words of hope      Artur Morin,  Staff   C: [phone]  /  [email or web address]

## 2020-01-25 NOTE — PROGRESS NOTES
01/24/20 1944   NIH Stroke Scale   Interval Other (comment)   LOC 0   LOC Questions 0   LOC Commands 0   Best Gaze 0   Visual 0   Facial Palsy 1   Motor Right Arm 0   Motor Left Arm 2   Motor Right Leg 0   Motor Left Leg 2   Limb Ataxia 0   Sensory 0   Best Language 1   Dysarthria 1   Extinction and Inattention 0   Total 7

## 2020-01-26 LAB — GLUCOSE BLD STRIP.AUTO-MCNC: 81 MG/DL (ref 65–100)

## 2020-01-26 PROCEDURE — 74011250636 HC RX REV CODE- 250/636: Performed by: INTERNAL MEDICINE

## 2020-01-26 PROCEDURE — 82962 GLUCOSE BLOOD TEST: CPT

## 2020-01-26 PROCEDURE — 74011250637 HC RX REV CODE- 250/637: Performed by: HOSPITALIST

## 2020-01-26 PROCEDURE — 74011250637 HC RX REV CODE- 250/637: Performed by: INTERNAL MEDICINE

## 2020-01-26 PROCEDURE — 65270000029 HC RM PRIVATE

## 2020-01-26 RX ADMIN — ENOXAPARIN SODIUM 40 MG: 40 INJECTION SUBCUTANEOUS at 14:45

## 2020-01-26 RX ADMIN — ASPIRIN 81 MG 81 MG: 81 TABLET ORAL at 09:31

## 2020-01-26 RX ADMIN — METOPROLOL SUCCINATE 25 MG: 25 TABLET, FILM COATED, EXTENDED RELEASE ORAL at 09:31

## 2020-01-26 RX ADMIN — ATORVASTATIN CALCIUM 80 MG: 80 TABLET, FILM COATED ORAL at 20:45

## 2020-01-26 RX ADMIN — GUAIFENESIN 100 MG: 100 SOLUTION ORAL at 20:45

## 2020-01-26 RX ADMIN — GUAIFENESIN 100 MG: 100 SOLUTION ORAL at 09:31

## 2020-01-26 RX ADMIN — METFORMIN HYDROCHLORIDE 500 MG: 500 TABLET, FILM COATED ORAL at 08:58

## 2020-01-26 RX ADMIN — ACETAMINOPHEN 650 MG: 325 TABLET, FILM COATED ORAL at 09:31

## 2020-01-26 RX ADMIN — ACETAMINOPHEN 650 MG: 325 TABLET, FILM COATED ORAL at 20:45

## 2020-01-26 RX ADMIN — LISINOPRIL 40 MG: 20 TABLET ORAL at 09:31

## 2020-01-26 RX ADMIN — METFORMIN HYDROCHLORIDE 500 MG: 500 TABLET, FILM COATED ORAL at 17:12

## 2020-01-26 NOTE — PROGRESS NOTES
01/25/20 0720   NIH Stroke Scale   Interval Other (comment)  (End of shift)   LOC 0   LOC Questions 0   LOC Commands 0   Best Gaze 0   Visual 0   Facial Palsy 1   Motor Right Arm 0   Motor Left Arm 2   Motor Right Leg 0   Motor Left Leg 2   Limb Ataxia 0   Sensory 0   Best Language 1   Dysarthria 1   Extinction and Inattention 0   Total 7     Dual NIH with Louise Caban RN

## 2020-01-26 NOTE — PROGRESS NOTES
01/26/20 0722   NIH Stroke Scale   Interval Other (comment)   LOC 0   LOC Questions 0   LOC Commands 0   Best Gaze 0   Visual 0   Facial Palsy 1   Motor Right Arm 0   Motor Left Arm 2   Motor Right Leg 0   Motor Left Leg 2   Limb Ataxia 0   Sensory 0   Best Language 1   Dysarthria 1   Extinction and Inattention 0   Total 7

## 2020-01-26 NOTE — PROGRESS NOTES
Progress Note    2020  Admit Date: 2019  9:07 AM   NAME: Alanna Boss   :  1975   MRN:  [id number]   Attending: Ashley Bruce MD  PCP:  Nic Deluna MD  Treatment Team: Attending Provider: Ashley Bruce MD; Care Manager: Kacy Ozuna RN; Care Manager: Fabiola Garcia, [facility] [place] Consulting Provider: Juvencio Camilo MD; Utilization Review: Rylan Asencio RN; Nurse Practitioner: Carlyle Tse NP    Full Code     This is a 54 yo male with PMH of DM II, HTN who presented 19 with c/o left leg and arm weakness, left facial droop and dysarthria. Code S called. MRI with acute infarctions in left cerebellar hemisphere, deep frontoparietal white matter and right paramedian yariel. Also noted to have old lacunar infarcts. CTA without large vessel occlusions, however some stenosis noted. Echo showed PFO, Cardiology to f/u outpatient for evaluation for closure. Neurology consulted. Started on ASA, statin. Pt is uninsured, difficult placement, case management working on plan. SUBJECTIVE:     No fever. No tingling, no numbness,Speech slurred. Still has weakness left upper and lower extremities.  Pt with slurred speech and left upper and lower extremity weakness, No fever. No nausea, no vomiting.      Past Medical History:   Diagnosis Date    Acute ischemic stroke ([address] 2019    Acute pancreatitis 2014    Cerebrovascular accident (CVA) due to embolism ([address] 2019    Diabetes ([address] 2002    Diabetes ([address]     Diabetes mellitus     Hypertension      Recent Results (from the past 24 hour(s))   GLUCOSE, POC    Collection Time: 20  6:52 AM   Result Value Ref Range    Glucose (POC) 81 65 - 100 mg/dL     No Known Allergies  Current Facility-Administered Medications   Medication Dose Route Frequency Provider Last Rate Last Dose    metFORMIN (GLUCOPHAGE) tablet 500 mg  500 mg Oral BID WITH MEALS Devan Soria MD   500 mg at 20 0858    metoprolol succinate (TOPROL-XL) XL tablet 25 mg  25 mg Oral DAILY Kailash Bishop MD   25 mg at 20 0931    polyethylene glycol (MIRALAX) packet 17 g  17 g Oral DAILY PRN Salazar Jenniferk C, DO   17 g at 20 6666    guaiFENesin (ROBITUSSIN) 100 mg/5 mL oral liquid 100 mg  100 mg Oral Q4H PRN Kailash Bishop MD   100 mg at 20 0931    hydrALAZINE (APRESOLINE) 20 mg/mL injection 20 mg  20 mg IntraVENous Q4H PRN Laina Adams MD   20 mg at 19 0133    atorvastatin (LIPITOR) tablet 80 mg  80 mg Oral QHS Laina Adams MD   80 mg at 20 2142    lisinopril (PRINIVIL, ZESTRIL) tablet 40 mg  40 mg Oral DAILY Laina Adams MD   40 mg at 20 4766    sodium chloride (NS) flush 5-40 mL  5-40 mL IntraVENous PRN Laina Adams MD   5 mL at 01/15/20 1529    ondansetron (ZOFRAN) injection 4 mg  4 mg IntraVENous Q4H PRN Laina Adams MD        aspirin chewable tablet 81 mg  81 mg Oral DAILY Laina Adams MD   81 mg at 20 7296    acetaminophen (TYLENOL) tablet 650 mg  650 mg Oral Q4H PRN Laina Adams MD   650 mg at 20 0931    enoxaparin (LOVENOX) injection 40 mg  40 mg SubCUTAneous Q24H Laina Adams MD   40 mg at 20 1442    dextrose 40% (GLUTOSE) oral gel 1 Tube  15 g Oral PRN Laina Adams MD        glucagon [facility]) injection 1 mg  1 mg IntraMUSCular PRN Laina Adams MD        dextrose (D50W) injection syrg 12.5-25 g  25-50 mL IntraVENous PRN Laina Adams MD           Review of Systems negative with exception of pertinent positives noted above  PHYSICAL EXAM     Visit Vitals  /83 (BP 1 Location: Right arm, BP Patient Position: At rest)   Pulse 75   Temp 98.7 °F (37.1 °C)   Resp 18   Ht 5' 6\" (1.676 m)   Wt 95.3 kg (210 lb)   SpO2 97%   BMI 33.89 kg/m²      Temp (24hrs), Av.3 °F (36.8 °C), Min:97.9 °F (36.6 °C), Max:98.8 °F (37.1 °C)    Oxygen Therapy  O2 Sat (%): 97 % (20 1200)  Pulse via Oximetry: 56 beats per minute (01/05/20 1918)  O2 Device: Room air (01/05/20 1930)  No intake or output data in the 24 hours ending 01/26/20 1358   General: No acute distress    Lungs: CTA bilaterally. Resp even and unlabored  Heart:  S1S2 present without murmurs rubs gallops. RRR. No LE edema  Abdomen: Soft, non tender, non distended. BS present  Extremities: No cyanosis. Left UE/LE weakness 1/5  Neurologic:  A/O X3. Dysarthria noted. Left facial droop. Sensation intact. LUE/LLE strength 2/5. Results summary of Diagnostic Studies/Procedures copied from within [facility] EMR:        [address] Problems    Diagnosis Date Noted    PFO (patent foramen ovale) 12/17/2019    Arrhythmia 12/15/2019    Hyperlipemia 97/18/5416    Acute embolic stroke ([facility] 76/28/9060    Type 2 diabetes mellitus (HCC)     Hypertension      Plan:    -Acute embolic stroke: POA  Dysphagia   MRI head: with acute infarcts in L cerebellar hemisphere, deep frontoparietal white matter, and R paramedian yariel.  Also noted old lacunar infarcts. ISMAEL: 3 mm PFO, needs to f/u outpatient with Cardiology for evaluation for closure   Cont statin, ASA  PT/OT/Speech     -Hypertension: continue home meds      -Type 2 diabetes mellitus:  Metformin     -Arrhythmia: had brief wide complex tachycardia early in admission, will need event monitor as outpt per cards    DVT Prophylaxis: lovenox    DISPO: Difficult placement, case management working on plan. Patient has no medical insurance. Pt can't go home as pt's wife is disabled and is not safe at home without supervision as pt needs long term care.       Leda Alatorre MD

## 2020-01-26 NOTE — PROGRESS NOTES
01/25/20 1910   NIH Stroke Scale   Interval Other (comment)  (Start of shift Dual NIH with Hayden Brito RN)   LOC 0   LOC Questions 0   LOC Commands 0   Best Gaze 0   Visual 0   Facial Palsy 1   Motor Right Arm 0   Motor Left Arm 2   Motor Right Leg 0   Motor Left Leg 2   Limb Ataxia 0   Sensory 0   Best Language 1   Dysarthria 1   Extinction and Inattention 0   Total 7

## 2020-01-27 LAB
ALBUMIN SERPL-MCNC: 1.6 G/DL (ref 3.5–5)
ALBUMIN/GLOB SERPL: 0.3 {RATIO} (ref 1.2–3.5)
ALP SERPL-CCNC: 81 U/L (ref 50–136)
ALT SERPL-CCNC: 20 U/L (ref 12–65)
ANION GAP SERPL CALC-SCNC: 9 MMOL/L (ref 7–16)
AST SERPL-CCNC: 19 U/L (ref 15–37)
BILIRUB SERPL-MCNC: 0.8 MG/DL (ref 0.2–1.1)
BUN SERPL-MCNC: 13 MG/DL (ref 6–23)
CALCIUM SERPL-MCNC: 9.9 MG/DL (ref 8.3–10.4)
CHLORIDE SERPL-SCNC: 110 MMOL/L (ref 98–107)
CO2 SERPL-SCNC: 24 MMOL/L (ref 21–32)
CREAT SERPL-MCNC: 1.1 MG/DL (ref 0.8–1.5)
ERYTHROCYTE [DISTWIDTH] IN BLOOD BY AUTOMATED COUNT: 17.2 % (ref 11.9–14.6)
GLOBULIN SER CALC-MCNC: 4.8 G/DL (ref 2.3–3.5)
GLUCOSE BLD STRIP.AUTO-MCNC: 110 MG/DL (ref 65–100)
GLUCOSE SERPL-MCNC: 78 MG/DL (ref 65–100)
HCT VFR BLD AUTO: 36.5 % (ref 41.1–50.3)
HGB BLD-MCNC: 11.4 G/DL (ref 13.6–17.2)
MCH RBC QN AUTO: 25.8 PG (ref 26.1–32.9)
MCHC RBC AUTO-ENTMCNC: 31.2 G/DL (ref 31.4–35)
MCV RBC AUTO: 82.6 FL (ref 79.6–97.8)
NRBC # BLD: 0 K/UL (ref 0–0.2)
PLATELET # BLD AUTO: 159 K/UL (ref 150–450)
PMV BLD AUTO: 11.9 FL (ref 9.4–12.3)
POTASSIUM SERPL-SCNC: 3.8 MMOL/L (ref 3.5–5.1)
PROT SERPL-MCNC: 6.4 G/DL (ref 6.3–8.2)
RBC # BLD AUTO: 4.42 M/UL (ref 4.23–5.6)
SODIUM SERPL-SCNC: 143 MMOL/L (ref 136–145)
WBC # BLD AUTO: 4.7 K/UL (ref 4.3–11.1)

## 2020-01-27 PROCEDURE — 74011250637 HC RX REV CODE- 250/637: Performed by: INTERNAL MEDICINE

## 2020-01-27 PROCEDURE — 80053 COMPREHEN METABOLIC PANEL: CPT

## 2020-01-27 PROCEDURE — 85027 COMPLETE CBC AUTOMATED: CPT

## 2020-01-27 PROCEDURE — 97530 THERAPEUTIC ACTIVITIES: CPT

## 2020-01-27 PROCEDURE — 36415 COLL VENOUS BLD VENIPUNCTURE: CPT

## 2020-01-27 PROCEDURE — 82962 GLUCOSE BLOOD TEST: CPT

## 2020-01-27 PROCEDURE — 65270000029 HC RM PRIVATE

## 2020-01-27 PROCEDURE — 74011250637 HC RX REV CODE- 250/637: Performed by: HOSPITALIST

## 2020-01-27 PROCEDURE — 97112 NEUROMUSCULAR REEDUCATION: CPT

## 2020-01-27 PROCEDURE — 92507 TX SP LANG VOICE COMM INDIV: CPT

## 2020-01-27 PROCEDURE — 97110 THERAPEUTIC EXERCISES: CPT

## 2020-01-27 PROCEDURE — 74011250636 HC RX REV CODE- 250/636: Performed by: INTERNAL MEDICINE

## 2020-01-27 RX ADMIN — GUAIFENESIN 100 MG: 100 SOLUTION ORAL at 20:36

## 2020-01-27 RX ADMIN — ENOXAPARIN SODIUM 40 MG: 40 INJECTION SUBCUTANEOUS at 14:51

## 2020-01-27 RX ADMIN — ASPIRIN 81 MG 81 MG: 81 TABLET ORAL at 08:19

## 2020-01-27 RX ADMIN — METOPROLOL SUCCINATE 25 MG: 25 TABLET, FILM COATED, EXTENDED RELEASE ORAL at 08:19

## 2020-01-27 RX ADMIN — GUAIFENESIN 100 MG: 100 SOLUTION ORAL at 08:19

## 2020-01-27 RX ADMIN — ACETAMINOPHEN 650 MG: 325 TABLET, FILM COATED ORAL at 08:19

## 2020-01-27 RX ADMIN — ACETAMINOPHEN 650 MG: 325 TABLET, FILM COATED ORAL at 20:36

## 2020-01-27 RX ADMIN — LISINOPRIL 40 MG: 20 TABLET ORAL at 08:19

## 2020-01-27 RX ADMIN — ATORVASTATIN CALCIUM 80 MG: 80 TABLET, FILM COATED ORAL at 20:36

## 2020-01-27 RX ADMIN — METFORMIN HYDROCHLORIDE 500 MG: 500 TABLET, FILM COATED ORAL at 08:18

## 2020-01-27 RX ADMIN — METFORMIN HYDROCHLORIDE 500 MG: 500 TABLET, FILM COATED ORAL at 17:25

## 2020-01-27 NOTE — PROGRESS NOTES
Problem: Self Care Deficits Care Plan (Adult)  Goal: *Acute Goals and Plan of Care (Insert Text)  Description  GOALS UPDATED 1/22/2020:   1. Patient will complete dynamic sitting balance for ADLs with supervision. 2. Patient will demonstrate appropriate safety awareness and protection of L UE during bed mobility and functional transfers with minimal cues. 3. Patient will complete total body bathing and dressing with moderate assistance and adaptive equipment as needed. 4. Patient will complete weightbearing into the L UE with ADL tasks with minimal assistance to improve ability to use as a functional assist during ADL tasks. 5. Patient will demonstrate modified independence with therapeutic exercises to improve strength in L UE for ADLs/functional transfers. 6. Patient will complete bed mobility with stand by assistance and adaptive equipment as needed in preparation for functional transfers. 7. Patient will complete functional transfers with contact guard assistance and adaptive equipment as needed. 8. Patient will complete functional mobility for ADLs with moderate assistance and adaptive equipment as needed.      Timeframe: 7 visits       Outcome: Progressing Towards Goal     OCCUPATIONAL THERAPY: Daily Note and PM    1/27/2020  INPATIENT: OT Visit Days: 3  Payor: MEDICAID PENDING / Plan: Cheri Mckeon PENDING / Product Type: Medicaid /      NAME/AGE/GENDER: Allie Winters is a 53 y.o. male   PRIMARY DIAGNOSIS:  Acute ischemic stroke ([facility] [I63.9]  Cerebrovascular accident (CVA) due to embolism ([address] [T54.2] Acute embolic stroke ([address] Acute embolic stroke ([facility]       ICD-10: Treatment Diagnosis:    · Generalized Muscle Weakness (M62.81)  · Other lack of cordination (R27.8)  · Hemiplegia and hemiparesis following cerebral infarction affecting   · left non-dominant side (I69.354)  · Abnormal posture (R29.3)   Precautions/Allergies:    NO PULLING ON LUE   LUE in sling with shoulder joint approximated and supported   Patient has no known allergies. ASSESSMENT:     Mr. Fiorella Urbina is a 53 y.o. male presents to the hospital with L sided weakness and acute ischemic CVA. MRI revealed acute to subacute L cerebellar and R paramedian yariel infarcts. At baseline pt lives with his wife and is independent. One finger wide subluxation noted in L shoulder (1/22/2020). 1/27/2020 Pt presents up in the chair upon arrival. Pt transferred to standing with min/mod a x's 2 with a keanu walker and completed steps to the bed with mod a x's 2 and additional time. Pt was assisted back to supine with min a and assisted with brief change and completed exercises listed below. Good effort. Continue POC. This patient is appropriate for co-treatment at this time due to multiple deficits including decreased balance, L hemiplegia, decreased endurance, decreased strength, and need for high level assistance to complete functional transfers and functional tasks. This section established at most recent assessment   PROBLEM LIST (Impairments causing functional limitations):  1. Decreased Strength  2. Decreased ADL/Functional Activities  3. Decreased Transfer Abilities  4. Decreased Ambulation Ability/Technique  5. Decreased Balance  6. Decreased Activity Tolerance  7. Increased Fatigue  8. Decreased Flexibility/Joint Mobility  9. Decreased Knowledge of Precautions  10. Decreased [place] with Home Exercise Program   INTERVENTIONS PLANNED: (Benefits and precautions of occupational therapy have been discussed with the patient.)  1. Activities of daily living training  2. Adaptive equipment training  3. Balance training  4. Clothing management  5. Cognitive training  6. Donning&doffing training  7. Keanu tech training  8. Neuromuscular re-eduation  9. Therapeutic activity  10. Therapeutic exercise     TREATMENT PLAN: Frequency/Duration: Follow patient 3 times per week to address above goals.   Rehabilitation Potential For Stated Goals: Excellent     REHAB RECOMMENDATIONS (at time of discharge pending progress):    Placement: It is my opinion, based on this patient's performance to date, that Mr. Kyung Cisse may benefit from intensive therapy at an [address] [place] [address] after discharge due to potential to make ongoing and sustainable functional improvement that is of practical value. Vira Frile Pt functioning far below independent baseline, demonstrating good improvement and participation. Pt would likely benefit greatly and increase independence from inpatient rehab stay. Equipment:    TBD               OCCUPATIONAL PROFILE AND HISTORY:   History of Present Injury/Illness (Reason for Referral):  See H&P  Past Medical History/Comorbidities:   Mr. Kyung Cisse  has a past medical history of Acute ischemic stroke ([address] (12/12/2019), Acute pancreatitis (11/19/2014), Cerebrovascular accident (CVA) due to embolism ([address] (12/12/2019), Diabetes ([address] (2002), Diabetes ([address] Diabetes mellitus, and Hypertension. He also has no past medical history of Arthritis, Asthma, Autoimmune disease ([address] CAD (coronary artery disease), Cancer ([address] Chronic kidney disease, COPD, Dementia, Dementia ([address] Heart failure ([address] Ill-defined condition, Infectious disease, Liver disease, Other ill-defined conditions(799.89), Psychiatric disorder, PUD (peptic ulcer disease), Seizures ([address] or Sleep disorder. Mr. Kyung Cisse  has a past surgical history that includes hx hernia repair and hx orthopaedic. Social History/Living Environment:   Home Environment: Apartment  # Steps to Enter: 12  Rails to Enter: Yes  [employer] : Bilateral  One/Two Story Residence: One story  Living Alone: No  Support Systems: Spouse/Significant Other/Partner  Patient Expects to be Discharged to[de-identified] Unknown  Current DME Used/Available at Home: None  Tub or Shower Type: Tub/Shower combination  Prior Level of Function/Work/Activity:  Pt lives at home with his wife.  Pt is typically independent with ADL/functional mobility. Pt does not drive. Pt was working part-time at [employer]. Personal Factors:          Age:  53 y.o. Past/Current Experience:  CVA with flaccid L side        Other factors that influence how disability is experienced by the patient:  multiple co-morbidities    Number of Personal Factors/Comorbidities that affect the Plan of Care: Extensive review of physical, cognitive, and psychosocial performance (3+):  HIGH COMPLEXITY   ASSESSMENT OF OCCUPATIONAL PERFORMANCE[de-identified]   Activities of Daily Living:   Basic ADLs (From Assessment) Complex ADLs (From Assessment)   Feeding: Setup  Oral Facial Hygiene/Grooming: Moderate assistance  Bathing: Maximum assistance  Upper Body Dressing: Maximum assistance  Lower Body Dressing: Total assistance  Toileting: Maximum assistance Instrumental ADL  Meal Preparation: Total assistance  Homemaking: Total assistance   Grooming/Bathing/Dressing Activities of Daily Living                             Bed/Mat Mobility  Supine to Sit: Moderate assistance  Sit to Supine: Moderate assistance  Sit to Stand: Minimum assistance; Moderate assistance;Assist x2  Stand to Sit: Minimum assistance; Moderate assistance;Assist x2  Bed to Chair: Moderate assistance;Assist x2;Minimum assistance     Most Recent Physical Functioning:   Gross Assessment:                  Posture:     Balance:  Sitting: Impaired  Sitting - Static: Fair (occasional)  Sitting - Dynamic: Fair (occasional)  Standing: Impaired  Standing - Static: Poor  Standing - Dynamic : Poor Bed Mobility:  Supine to Sit: Moderate assistance  Sit to Supine: Moderate assistance  Wheelchair Mobility:     Transfers:  Sit to Stand: Minimum assistance; Moderate assistance;Assist x2  Stand to Sit: Minimum assistance; Moderate assistance;Assist x2  Bed to Chair: Moderate assistance;Assist x2;Minimum assistance            Patient Vitals for the past 6 hrs:   BP BP Patient Position SpO2 Pulse   01/27/20 1200 105/68 At rest 99 % 66 Mental Status  Neurologic State: Alert  Orientation Level: Oriented X4  Cognition: Follows commands  Perception: Cues to maintain midline in standing  Perseveration: No perseveration noted  Safety/Judgement: Awareness of environment, Fall prevention, Insight into deficits                          Physical Skills Involved:  1. Range of Motion  2. Balance  3. Strength  4. Activity Tolerance  5. Fine Motor Control  6. Gross Motor Control Cognitive Skills Affected (resulting in the inability to perform in a timely and safe manner):  1. Perception  2. Expression Psychosocial Skills Affected:  1. Habits/Routines  2. Environmental Adaptation  3. Social Interaction  4. Emotional Regulation  5. Self-Awareness  6. Awareness of Others  7. Social Roles   Number of elements that affect the Plan of Care: 5+:  HIGH COMPLEXITY   CLINICAL DECISION MAKIN[address] [place] [id number] AM-PAC 6 Clicks   Daily Activity Inpatient Short Form  How much help from another person does the patient currently need. .. Total A Lot A Little None   1. Putting on and taking off regular lower body clothing? [x] 1   [] 2   [] 3   [] 4   2. Bathing (including washing, rinsing, drying)? [] 1   [x] 2   [] 3   [] 4   3. Toileting, which includes using toilet, bedpan or urinal?   [] 1   [x] 2   [] 3   [] 4   4. Putting on and taking off regular upper body clothing? [] 1   [x] 2   [] 3   [] 4   5. Taking care of personal grooming such as brushing teeth? [] 1   [x] 2   [] 3   [] 4   6. Eating meals? [] 1   [] 2   [x] 3   [] 4   © , Trustees of [address] [place] [id number], under license to [employer]. All rights reserved      Score:  Initial: 11 Most Recent: 12 (Date: 2020 )    Interpretation of Tool:  Represents activities that are increasingly more difficult (i.e. Bed mobility, Transfers, Gait). Medical Necessity:     · Patient demonstrates   · good and excellent  ·  rehab potential due to higher previous functional level.   Reason for Services/Other Comments:  · Patient continues to require skilled intervention due to   · Decreased independence with ADL/functional transfers that impacts overall quality of life. · .   Use of outcome tool(s) and clinical judgement create a POC that gives a: MODERATE COMPLEXITY         TREATMENT:   (In addition to Assessment/Re-Assessment sessions the following treatments were rendered)     Pre-treatment Symptoms/Complaints:    Pain: Initial:   Pain Intensity 1: 0  Pain Location 1: Shoulder  Pain Orientation 1: Left  Pain Intervention(s) 1: Exercise, Repositioned  Post Session:  0     Therapeutic Activity: (11 minutes): Therapeutic activities including Bed transfers, Chair transfers and static/dynamic standing to improve mobility, strength and balance. Required moderate   to promote static and dynamic balance in standing. Therapeutic Exercise: (13 minutes):  Exercises per grid below to improve mobility and strength. Required maximal manual cues to promote proper body posture and promote proper body mechanics. Progressed range and repetitions as indicated. Date:  1/27/2020 Date:   Date:     Activity/Exercise Parameters Parameters Parameters   LUE finger flexion, AROM gravity minimized then AAROM to complete ROM 2x10 reps     LUE finger extension AAROM 2x10 reps     LUE wrist extension, AROM gravity minimized then AAROM to complete ROM 1x10 reps     LUE wrist extension, AROM against gravity  1x10 reps     LUE wrist flexion AAROM 2x10 reps     LUE elbow flexion/extension PROM x10 reps     LUE shoulder flexion PROM x10 reps                      Braces/Orthotics/Lines/Etc:   · LUE sling   · O2 device: Room air  · Treatment/Session Assessment:    · Response to Treatment:  Pt demonstrated good participation.    · Interdisciplinary Collaboration:   o Certified Occupational Therapy Assistant  o Registered Nurse  · After treatment position/precautions:   o Supine in bed  o Bed alarm/tab alert on  o Bed/Chair-wheels locked  o Call light within reach  o RN notified   · Compliance with Program/Exercises: Compliant all of the time, Will assess as treatment progresses. · Recommendations/Intent for next treatment session: \"Next visit will focus on advancements to more challenging activities and reduction in assistance provided\".   Total Treatment Duration:  OT Patient Time In/Time Out  Time In: 1447  Time Out: [facility]

## 2020-01-27 NOTE — PROGRESS NOTES
01/26/20 1906   NIH Stroke Scale   Interval Other (comment)   LOC 0   LOC Questions 0   LOC Commands 0   Best Gaze 0   Visual 0   Facial Palsy 1   Motor Right Arm 0   Motor Left Arm 2   Motor Right Leg 0   Motor Left Leg 2   Limb Ataxia 0   Sensory 0   Best Language 1   Dysarthria 1   Extinction and Inattention 0   Total 7

## 2020-01-27 NOTE — PROGRESS NOTES
01/27/20 0701   NIH Stroke Scale   Interval Other (comment)  (Dual NIH with Al, RN)   LOC 0   LOC Questions 0   LOC Commands 0   Best Gaze 0   Visual 0   Facial Palsy 1   Motor Right Arm 0   Motor Left Arm 2   Motor Right Leg 0   Motor Left Leg 2   Limb Ataxia 0   Sensory 0   Best Language 1   Dysarthria 1   Extinction and Inattention 0   Total 7        01/27/20 0701   NIH Stroke Scale   Interval Other (comment)  (Dual NIH with Al, RN)   LOC 0   LOC Questions 0   LOC Commands 0   Best Gaze 0   Visual 0   Facial Palsy 1   Motor Right Arm 0   Motor Left Arm 2   Motor Right Leg 0   Motor Left Leg 2   Limb Ataxia 0   Sensory 0   Best Language 1   Dysarthria 1   Extinction and Inattention 0   Total 7

## 2020-01-27 NOTE — PROGRESS NOTES
Dysphagia(re evaluation 1/7/2020)  LTG: Patient will tolerate least restrictive diet without overt signs or symptoms of airway compromise. STG: Patient will tolerate mechanical soft diet(ground meats/chopped vegetables) and thin liquids by cup without overt signs or symptoms of airway compromise. STG: Patient will demonstrate use of compensatory strategies to improve swallow safety/function with 90% accuracy given minimal cueing  STG: Patient participate in exercises to improve oral motor movement with 80% accuracy given minimal cueing  STG: Patient will participate in modified barium swallow study as clinically indicated. Neurolinguistic Goals:  LTG: Patient will increase neuro-linguistic abilities to increase safety and awareness in functional living environment   STG: Patient will participate in speech/language/cognitive evaluation.  MET 12/17/19  STG: Patient will solve mathematical problems with 80%accuracy given minimal cueing   STG: Patient will name 10 items to concrete category in 1 minute given minimal cueing  STG: Patient will participate in verbal reasoning tasks with 80% accuracy given minimal cueing  STG: Patient will complete mental manipulation tasks with 80% accuracy given minimal cueing  STG: Patient will complete working memory/attention tasks with 80% accuracy given minimal cueing  STG: Patient will recall relevant verbal information with 80% accuracy with minimal assistance  STG: Patient will utilize memory compensatory strategies to improve recall of functional list/message with 90%accuracy given minimal assistance  STG: Patient will participate in ongoing cognitive linguistic evaluation for therapeutic assessment     SPEECH LANGUAGE PATHOLOGY: DYSPHAGIA and SPEECH/LANGUAGE- Daily Note 7    NAME/AGE/GENDER: Alanna Boss is a 53 y.o. male  DATE: 1/27/2020  PRIMARY DIAGNOSIS: Acute ischemic stroke ([facility] [address] [I63.9]  Cerebrovascular accident (CVA) due to embolism ([facility] [address] [I63.9]      ICD-10: Treatment Diagnosis: R13.12 Dysphagia, Oropharyngeal Phase    INTERDISCIPLINARY COLLABORATION: Registered Nurse  PRECAUTIONS/ALLERGIES: Patient has no known allergies. SUBJECTIVE   Upright in bed. Just finished breakfast meal.   Orientation:   Person  Place  Time  Situation    Pain: Pain Scale 1: Numeric (0 - 10)  Pain Intensity 1: 0     OBJECTIVE   Swallowing:   Not addressed during session          Speech/cognitive linguistic treatment:   Functional memory: 4/4 breakfast items consumed. Recalled 3 tasks from physical therapy over weekend. Short term memory: word list retention: repeated 4 words accurately on 1/5 trials. Patient able to recall 4/4 words after 2nd repetition during tasks. Completed category inclusion with given words 10/12 accurate given moderate verbal cues. Reasoning: provided 2 definitions of multiple meaning words with 3/5 accurate given moderate verbal cues. Speech intelligibility: 80% intelligible in conversation. Patient able to to order lunch meal from unfamiliar listener without communication breakdown. Category exclusion (1:5) 2/4 independently; 4/4 with repetition      ASSESSMENT   Swallowing: Not addressed. Continue current diet: mechanical soft diet/thin liquids by cup only. No straws. Speech/Cognitive Function: Continues to present with moderate dysarthria and cognitive linguistic impairments. Short term and immediate memory moderately impaired reducing functional recall. Reduced reasoning skills during structured tasks also reduce patient's ability to complete higher level ADLS independently upon discharge. Recommend ongoing skilled intervention to improve swallow/function strength and cognitive linguistic abilities.           Tool Used: Dysphagia Outcome and Severity Scale (ROCHELLE)    Score Comments   Normal Diet  [] 7 With no strategies or extra time needed   Functional Swallow  [] 6 May have mild oral or pharyngeal delay   Mild Dysphagia  [] 5 Which may require one diet consistency restricted    Mild-Moderate Dysphagia  [] 4 With 1-2 diet consistencies restricted   Moderate Dysphagia  [] 3 With 2 or more diet consistencies restricted   Moderate-Severe Dysphagia  [] 2 With partial PO strategies (trials with ST only)   Severe Dysphagia  [] 1 With inability to tolerate any PO safely      Score:  Initial:4 Most Recent: 4 (Date 01/27/20 )   Interpretation of Tool: The Dysphagia Outcome and Severity Scale (ROCHELLE) is a simple, easy-to-use, 7-point scale developed to systematically rate the functional severity of dysphagia based on objective assessment and make recommendations for diet level, independence level, and type of nutrition. PLAN    FREQUENCY/DURATION: Continue to follow patient 3 times a week for duration of hospital stay to address above goals. - Recommendations for next treatment session: Next treatment will address dysarthria, dysphagia, cognition     REHABILITATION POTENTIAL FOR STATED GOALS: Good     COMPLIANCE WITH PROGRAM/EXERCISES: Will assess as treatment progresses    CONTINUATION OF SKILLED SERVICES/MEDICAL NECESSITY:   Patient is expected to demonstrate progress in  swallow strength, swallow timeliness, swallow function, diet tolerance and swallow safety in order to  improve swallow safety, work toward diet advancement and decrease aspiration risk.  Patient continues to require skilled intervention due to dysphagia.           RECOMMENDATIONS   DIET:    PO:  Mechanical soft with ground meat/chopped vegatables   Liquids:  regular thin by cup only    MEDICATIONS: Crushed in puree     ASPIRATION PRECAUTIONS  · Slow rate of intake  · Small bites/sips  · Upright at 90 degrees during meal     COMPENSATORY STRATEGIES/MODIFICATIONS  · Alternate liquids/solids  · Small sips and bites  · Slow rate  · NO STRAWS   EDUCATION:  · Patient     RECOMMENDATIONS for CONTINUED SPEECH THERAPY: YES: Anticipate need for ongoing speech therapy during this hospitalization and at next level of care.          SAFETY:  After treatment position/precautions:  · Upright in bed  · Call light within reach    Total Treatment Duration:   Time In: 0909  Time Out: [address] [place] [address], [address] [facility]

## 2020-01-27 NOTE — PROGRESS NOTES
Problem: Pressure Injury - Risk of  Goal: *Prevention of pressure injury  Description  Document Dewey Scale and appropriate interventions in the flowsheet. Outcome: Progressing Towards Goal  Note: Pressure Injury Interventions:  Sensory Interventions: Assess changes in LOC, Discuss PT/OT consult with provider, Float heels, Keep linens dry and wrinkle-free, Maintain/enhance activity level, Minimize linen layers, Pressure redistribution bed/mattress (bed type), Turn and reposition approx. every two hours (pillows and wedges if needed)    Moisture Interventions: Absorbent underpads, Apply protective barrier, creams and emollients, Check for incontinence Q2 hours and as needed, Limit adult briefs    Activity Interventions: Increase time out of bed, Pressure redistribution bed/mattress(bed type), PT/OT evaluation    Mobility Interventions: Assess need for specialty bed, HOB 30 degrees or less, Pressure redistribution bed/mattress (bed type), PT/OT evaluation, Turn and reposition approx. every two hours(pillow and wedges)    Nutrition Interventions: Document food/fluid/supplement intake    Friction and Shear Interventions: HOB 30 degrees or less                Problem: Patient Education: Go to Patient Education Activity  Goal: Patient/Family Education  Outcome: Progressing Towards Goal     Problem: Falls - Risk of  Goal: *Absence of Falls  Description  Document [facility] Fall Risk and appropriate interventions in the flowsheet.   Outcome: Progressing Towards Goal  Note: Fall Risk Interventions:  Mobility Interventions: Bed/chair exit alarm, OT consult for ADLs, PT Consult for mobility concerns, Patient to call before getting OOB    Mentation Interventions: Bed/chair exit alarm, Door open when patient unattended    Medication Interventions: Bed/chair exit alarm, Evaluate medications/consider consulting pharmacy, Patient to call before getting OOB, Teach patient to arise slowly    Elimination Interventions: Bed/chair exit alarm, Call light in reach, Stay With Me (per policy), Patient to call for help with toileting needs    History of Falls Interventions: Bed/chair exit alarm, Door open when patient unattended, Investigate reason for fall         Problem: Patient Education: Go to Patient Education Activity  Goal: Patient/Family Education  Outcome: Progressing Towards Goal     Problem: Diabetes Self-Management  Goal: *Disease process and treatment process  Description  Define diabetes and identify own type of diabetes; list 3 options for treating diabetes. Outcome: Progressing Towards Goal  Goal: *Incorporating nutritional management into lifestyle  Description  Describe effect of type, amount and timing of food on blood glucose; list 3 methods for planning meals. Outcome: Progressing Towards Goal  Goal: *Incorporating physical activity into lifestyle  Description  State effect of exercise on blood glucose levels. Outcome: Progressing Towards Goal  Goal: *Developing strategies to promote health/change behavior  Description  Define the ABC's of diabetes; identify appropriate screenings, schedule and personal plan for screenings. Outcome: Progressing Towards Goal  Goal: *Using medications safely  Description  State effect of diabetes medications on diabetes; name diabetes medication taking, action and side effects. Outcome: Progressing Towards Goal  Goal: *Monitoring blood glucose, interpreting and using results  Description  Identify recommended blood glucose targets  and personal targets. Outcome: Progressing Towards Goal  Goal: *Prevention, detection, treatment of acute complications  Description  List symptoms of hyper- and hypoglycemia; describe how to treat low blood sugar and actions for lowering  high blood glucose level.   Outcome: Progressing Towards Goal  Goal: *Prevention, detection and treatment of chronic complications  Description  Define the natural course of diabetes and describe the relationship of blood glucose levels to long term complications of diabetes.   Outcome: Progressing Towards Goal  Goal: *Developing strategies to address psychosocial issues  Description  Describe feelings about living with diabetes; identify support needed and support network  Outcome: Progressing Towards Goal     Problem: Patient Education: Go to Patient Education Activity  Goal: Patient/Family Education  Outcome: Progressing Towards Goal     Problem: General Medical Care Plan  Goal: *Vital signs within specified parameters  Outcome: Progressing Towards Goal  Goal: *Labs within defined limits  Outcome: Progressing Towards Goal  Goal: *Absence of infection signs and symptoms  Description  Wash hand more often   Outcome: Progressing Towards Goal  Goal: *Optimal pain control at patient's stated goal  Outcome: Progressing Towards Goal  Goal: *Skin integrity maintained  Outcome: Progressing Towards Goal  Goal: *Fluid volume balance  Outcome: Progressing Towards Goal  Goal: *Optimize nutritional status  Outcome: Progressing Towards Goal  Goal: *Anxiety reduced or absent  Outcome: Progressing Towards Goal  Goal: *Progressive mobility and function (eg: ADL's)  Outcome: Progressing Towards Goal     Problem: Patient Education: Go to Patient Education Activity  Goal: Patient/Family Education  Outcome: Progressing Towards Goal     Problem: Patient Education: Go to Patient Education Activity  Goal: Patient/Family Education  Outcome: Progressing Towards Goal

## 2020-01-27 NOTE — PROGRESS NOTES
Problem: Mobility Impaired (Adult and Pediatric)  Goal: *Acute Goals and Plan of Care  Description  Acute PT Goals (reviewed & updated 1/17/2020):  STG:  (1.) Mr. Clovis Jordan will move from supine to sit and sit to supine , scoot up and down and roll side to side with CONTACT GUARD ASSIST within 3 treatment day(s). (2.) Mr. Clovis Jordan will transfer from bed to chair and chair to bed with MODERATE ASSIST x1 using the least restrictive device within 3 treatment day(s). (3.) Mr. Clovis Jordan will perform standing static and dynamic balance activities x 10 minutes with MODERATE ASSIST x1 to improve safety within 3 treatment day(s). LTG:  (1.) Mr. Clovis Jordan will move from supine to sit and sit to supine , scoot up and down and roll side to side with STAND BY ASSIST within 7 treatment day(s). (2.) Mr. Clovis Jordan will transfer from bed to chair and chair to bed with MINIMAL ASSIST using the least restrictive device within 7 treatment day(s). (3.) Mr. Clovis Jordan will ambulate with MODERATE ASSIST for 20+ feet with the least restrictive device within 7 treatment day(s). (4.) Mr. Clovis Jordan will perform standing static and dynamic balance activities x 20 minutes with MINIMAL ASSIST to improve safety within 7 treatment day(s). (5.) Mr. Clovis Jordan will perform bilateral lower extremity exercises x 15 min for HEP with SUPERVISION to improve strength, endurance, and functional mobility within 7 treatment day(s).      PHYSICAL THERAPY: Daily Note and PM 1/27/2020  INPATIENT: PT Visit Days : 5  Payor: MEDICAID PENDING / Plan: [place] PENDING / Product Type: Medicaid /       NAME/AGE/GENDER: Kath Huizar is a 53 y.o. male   PRIMARY DIAGNOSIS: Acute ischemic stroke ([address] [I63.9]  Cerebrovascular accident (CVA) due to embolism ([address] [X61.2] Acute embolic stroke ([address] Acute embolic stroke ([address]       ICD-10: Treatment Diagnosis:   · Other lack of cordination (R27.8)  · Difficulty in walking, Not elsewhere classified (R26.2)  · Other abnormalities of gait and mobility (R26.89)  · Unspecified Lack of Coordination (R27.9)  · Hemiplegia and hemiparesis following cerebral infarction affecting   · left non-dominant side (M48.039)   Precaution/Allergies:  Patient has no known allergies. ASSESSMENT:     Mr. Ana Olson is a 53year old admitted with acute CVA with left hemiparesis and left inattention. Patient was supine upon contact and agreeable to PT. Patient reports not feeling as well today but willing to do the best he can. Patient performs supine to sit with mod assist, additional time, and cues for improved/proper technique. Patient demonstrates fair static/dynamic sitting balance EOB. Patient then transfers to standing with min-mod assist x 2 and cues for improved/proper technique. Once standing patient attempted gait training but unable to advance LE's stating he feels fatigued. Patient sits to rest then performs additional transfer training x 3 reps with same assist above. Patient performs static standing balance with UE support on luke walker demonstrating mostly poor balance. Patient then sits in recliner chair where he then participates in therapeutic strengthening exercises to improve functional strength for transfers, gait and overall mobility. Patient requires cues and assistance to perform exercises correctly. Overall slow progress towards physical therapy goals. Patient's goals listed above are still appropriate. Will continue skilled PT to address remaining deficits. This section established at most recent assessment   PROBLEM LIST (Impairments causing functional limitations):  1. Decreased Strength  2. Decreased ADL/Functional Activities  3. Decreased Transfer Abilities  4. Decreased Ambulation Ability/Technique  5. Decreased Balance  6. Increased Pain  7. Decreased Activity Tolerance  8. Increased Fatigue  9.  Decreased Flexibility/Joint Mobility   INTERVENTIONS PLANNED: (Benefits and precautions of physical therapy have been discussed with the patient.)  1. Balance Exercise  2. Bed Mobility  3. Family Education  4. Gait Training  5. Home Exercise Program (HEP)  6. Manual Therapy  7. Neuromuscular Re-education/Strengthening  8. Range of Motion (ROM)  9. Therapeutic Activites  10. Therapeutic Exercise/Strengthening  11. Transfer Training     TREATMENT PLAN: Frequency/Duration: 3 times a week for duration of hospital stay  Rehabilitation Potential For Stated Goals: Good     REHAB RECOMMENDATIONS (at time of discharge pending progress):    Placement: It is my opinion, based on this patient's performance to date, that Mr. Latrell Collins may benefit from intensive therapy at an [address] [place] [address] after discharge due to a probable need for 24 hour rehab nursing, a probable need for multiple therapy disciplines, and potential to make ongoing and sustainable functional improvement that is of practical value. .  Equipment:    TBD pending progress with therapy. HISTORY:   History of Present Injury/Illness (Reason for Referral):  Per H&P: \"Pt is a 54 y/o smoker with DM, HTN, who presented to ER with L leg and arm weakness, L facial droop, dysarthria. First noted L leg weakness late night 12/11 when he woke up to go to the bathroom. He was normal when he went to bed around 1030. Woke up this morning and had persistent weakness L leg and also now noted in L arm. EMS called. Noted to have slurred speech and L facial droop as well. Code S called in ER around 9am.  MRI with acute infarcts in L cerebellar hemisphere, deep frontoparietal white matter, and R paramedian yariel. Also noted old lacunar infarcts. No large vessel occlusion on CTA, but some stenosis noted. No hx afib, TIA, CVA. No CP, palpitations, SOB. \"  Past Medical History/Comorbidities:   Mr. Latrell Collins  has a past medical history of Acute ischemic stroke ([facility] [address] (12/12/2019), Acute pancreatitis (11/19/2014), Cerebrovascular accident (CVA) due to embolism ([address] (12/12/2019), Diabetes ([facility] [address] (2002), Diabetes ([address] Diabetes mellitus, and Hypertension. He also has no past medical history of Arthritis, Asthma, Autoimmune disease ([address] CAD (coronary artery disease), Cancer ([address] Chronic kidney disease, COPD, Dementia, Dementia ([address] Heart failure ([address] Ill-defined condition, Infectious disease, Liver disease, Other ill-defined conditions(799.89), Psychiatric disorder, PUD (peptic ulcer disease), Seizures ([address] or Sleep disorder. Mr. Patty Brown  has a past surgical history that includes hx hernia repair and hx orthopaedic. Social History/Living Environment:   Home Environment: Apartment  # Steps to Enter: 12  Rails to Enter: Yes  [employer] : Bilateral  One/Two Story Residence: One story  Living Alone: No  Support Systems: Spouse/Significant Other/Partner  Patient Expects to be Discharged to[de-identified] Unknown  Current DME Used/Available at Home: None  Tub or Shower Type: Tub/Shower combination  Prior Level of Function/Work/Activity:  Independent, lives with wife in 2nd story 1 level apartment. No recent falls. Number of Personal Factors/Comorbidities that affect the Plan of Care: 1-2: MODERATE COMPLEXITY   EXAMINATION:   Most Recent Physical Functioning:   Gross Assessment:                  Posture:     Balance:  Sitting: Impaired  Sitting - Static: Fair (occasional)  Sitting - Dynamic: Fair (occasional)  Standing: Impaired  Standing - Static: Poor  Standing - Dynamic : Poor Bed Mobility:  Supine to Sit: Moderate assistance  Wheelchair Mobility:     Transfers:  Sit to Stand: Minimum assistance; Moderate assistance;Assist x2  Stand to Sit: Minimum assistance; Moderate assistance;Assist x2  Bed to Chair: Moderate assistance;Assist x2;Minimum assistance  Gait:            Body Structures Involved:  1. Nerves  2. Voice/Speech  3. Bones  4. Joints  5. Muscles Body Functions Affected:  1. Mental  2. Sensory/Pain  3. Neuromusculoskeletal  4.  Movement Related Activities and Participation Affected:  1. General Tasks and Demands  2. Mobility  3. Self Care  4. Interpersonal Interactions and Relationships   Number of elements that affect the Plan of Care: 4+: HIGH COMPLEXITY   CLINICAL PRESENTATION:   Presentation: Evolving clinical presentation with changing clinical characteristics: MODERATE COMPLEXITY   CLINICAL DECISION MA[facility]IN [facility] Mobility Inpatient Short Form  How much difficulty does the patient currently have. .. Unable A Lot A Little None   1. Turning over in bed (including adjusting bedclothes, sheets and blankets)? [] 1   [] 2   [x] 3   [] 4   2. Sitting down on and standing up from a chair with arms ( e.g., wheelchair, bedside commode, etc.)   [] 1   [x] 2   [] 3   [] 4   3. Moving from lying on back to sitting on the side of the bed? [] 1   [] 2   [x] 3   [] 4   How much help from another person does the patient currently need. .. Total A Lot A Little None   4. Moving to and from a bed to a chair (including a wheelchair)? [] 1   [x] 2   [] 3   [] 4   5. Need to walk in hospital room? [] 1   [x] 2   [] 3   [] 4   6. Climbing 3-5 steps with a railing? [] 1   [x] 2   [] 3   [] 4   © , Trustees of [address] [place] [address], under license to [employer]. All rights reserved      Score:  Initial: 13 Most Recent: 14 (Date:2020)    Interpretation of Tool:  Represents activities that are increasingly more difficult (i.e. Bed mobility, Transfers, Gait). Medical Necessity:     · Patient is expected to demonstrate progress in   · strength, range of motion, balance, coordination, and functional technique  ·  to   · increase independence with all mobility. · .  Reason for Services/Other Comments:  · Patient continues to require skilled intervention due to   · medical complications and mobility deficits which impact his level of function, safety, and independence as indicated above.    · .   Use of outcome tool(s) and clinical judgement create a POC that gives a: Questionable prediction of patient's progress: MODERATE COMPLEXITY        TREATMENT:      Pre-treatment Symptoms/Complaints:  Fatigued  Pain: Initial: None Post Session:  None     Therapeutic Activity: (    24 minutes): Therapeutic activities including bed mobility training, transfer training from various surface heights, static/dynamic standing balance, scooting, posture training, LE positioning, LE exercises, and patient education to improve mobility, strength, balance and coordination. Required moderate verbal, tactile and manual cues   to promote dynamic balance in standing and promote coordination of bilateral, lower extremity(s). Therapeutic Exercise: (   minutes):  Exercises per grid below to improve mobility and strength. Required moderate visual, verbal and manual cues to promote proper body posture and promote proper body mechanics. Progressed range and repetitions as indicated. Date:  1/21/20 Date:  1/24/20 Date:     ACTIVITY/EXERCISE AM PM AM PM AM PM   Ambulation:           Distance  Device  Duration         Seated Heel Raises X 10 B, P-L  X 10 B, P-L      Seated Toe Raises X 10 B, P-L  X 10 B, P-L      Seated Long Arc Quads X 10 B, AA-L  X 10 B, AA-L      Seated Marching X 10 B, AA-L  X 10 B, AA-L      Seated knee squeezes X 15 B, AA-L  X 10 B, AA-L               B = bilateral; AA = active assistive; A = active; P = passive              Braces/Orthotics/Lines/Etc:   · O2 Device: Room Air   Treatment/Session Assessment:    · Response to Treatment:  See above  · Interdisciplinary Collaboration:   o Physical Therapy Assistant  o Registered Nurse  o Rehabilitation Attendant  · After treatment position/precautions:   o Up in chair  o Bed alarm/tab alert on  o Bed/Chair-wheels locked  o Bed in low position  o Call light within reach  o RN notified   · Compliance with Program/Exercises: Compliant all of the time  · Recommendations/Intent for next treatment session:   \"Next visit will focus on advancements to more challenging activities and reduction in assistance provided\".     Total Treatment Duration:  PT Patient Time In/Time Out  Time In: 1303  Time Out: Lilian Mg, PTA

## 2020-01-27 NOTE — PROGRESS NOTES
Progress Note    2020  Admit Date: 2019  9:07 AM   NAME: Mikey Stephen   :  1975   MRN:  [id number]   Attending: Demetri Morejon MD  PCP:  Sheldon Amato MD  Treatment Team: Attending Provider: Demetri Morejon MD; Care Manager: Nancy Kumar RN; Care Manager: Gillian Montero LMSW; Consulting Provider: Gerri Diaz MD; Utilization Review: Danette Seip, RN; Nurse Practitioner: Sima Blanton NP; Physical Therapy Assistant: Selma Pichardo PTA; Occupational Therapist: Mei Wilson    Full Code     This is a 52 yo male with PMH of DM II, HTN who presented 19 with c/o left leg and arm weakness, left facial droop and dysarthria. Code S called. MRI with acute infarctions in left cerebellar hemisphere, deep frontoparietal white matter and right paramedian yariel. Also noted to have old lacunar infarcts. CTA without large vessel occlusions, however some stenosis noted. Echo showed PFO, Cardiology to f/u outpatient for evaluation for closure. Neurology consulted. Started on ASA, statin. Pt is uninsured, difficult placement, case management working on plan. SUBJECTIVE:    patient still has slurred speech. Left upper and lower extremity weakness present. No acute issues overnight.     Past Medical History:   Diagnosis Date    Acute ischemic stroke ([address] 2019    Acute pancreatitis 2014    Cerebrovascular accident (CVA) due to embolism ([address] 2019    Diabetes ([address] 2002    Diabetes ([address]     Diabetes mellitus     Hypertension      Recent Results (from the past 24 hour(s))   CBC W/O DIFF    Collection Time: 20  5:15 AM   Result Value Ref Range    WBC 4.7 4.3 - 11.1 K/uL    RBC 4.42 4.23 - 5.6 M/uL    HGB 11.4 (L) 13.6 - 17.2 g/dL    HCT 36.5 (L) 41.1 - 50.3 %    MCV 82.6 79.6 - 97.8 FL    MCH 25.8 (L) 26.1 - 32.9 PG    MCHC 31.2 (L) 31.4 - 35.0 g/dL    RDW 17.2 (H) 11.9 - 14.6 %    PLATELET 080 846 - 996 K/uL    MPV 11.9 9.4 - 12.3 FL ABSOLUTE NRBC 0.00 0.0 - 0.2 K/uL   METABOLIC PANEL, COMPREHENSIVE    Collection Time: 01/27/20  5:15 AM   Result Value Ref Range    Sodium 143 136 - 145 mmol/L    Potassium 3.8 3.5 - 5.1 mmol/L    Chloride 110 (H) 98 - 107 mmol/L    CO2 24 21 - 32 mmol/L    Anion gap 9 7 - 16 mmol/L    Glucose 78 65 - 100 mg/dL    BUN 13 6 - 23 MG/DL    Creatinine 1.10 0.8 - 1.5 MG/DL    GFR est AA >60 >60 ml/min/1.73m2    GFR est non-AA >60 >60 ml/min/1.73m2    Calcium 9.9 8.3 - 10.4 MG/DL    Bilirubin, total 0.8 0.2 - 1.1 MG/DL    ALT (SGPT) 20 12 - 65 U/L    AST (SGOT) 19 15 - 37 U/L    Alk.  phosphatase 81 50 - 136 U/L    Protein, total 6.4 6.3 - 8.2 g/dL    Albumin 1.6 (L) 3.5 - 5.0 g/dL    Globulin 4.8 (H) 2.3 - 3.5 g/dL    A-G Ratio 0.3 (L) 1.2 - 3.5     GLUCOSE, POC    Collection Time: 01/27/20  7:46 AM   Result Value Ref Range    Glucose (POC) 110 (H) 65 - 100 mg/dL     No Known Allergies  Current Facility-Administered Medications   Medication Dose Route Frequency Provider Last Rate Last Dose    metFORMIN (GLUCOPHAGE) tablet 500 mg  500 mg Oral BID WITH MEALS Bri Owens MD   500 mg at 01/27/20 0818    metoprolol succinate (TOPROL-XL) XL tablet 25 mg  25 mg Oral DAILY Bri Owens MD   25 mg at 01/27/20 0819    polyethylene glycol (MIRALAX) packet 17 g  17 g Oral DAILY PRN Cecile TARIQ DO   17 g at 01/09/20 7275    guaiFENesin (ROBITUSSIN) 100 mg/5 mL oral liquid 100 mg  100 mg Oral Q4H PRN Bri Owens MD   100 mg at 01/27/20 0819    hydrALAZINE (APRESOLINE) 20 mg/mL injection 20 mg  20 mg IntraVENous Q4H PRN Kait Bobby MD   20 mg at 12/23/19 0133    atorvastatin (LIPITOR) tablet 80 mg  80 mg Oral QHS Kait Bobby MD   80 mg at 01/26/20 2045    lisinopril (PRINIVIL, ZESTRIL) tablet 40 mg  40 mg Oral DAILY Kait Bobby MD   40 mg at 01/27/20 2178    sodium chloride (NS) flush 5-40 mL  5-40 mL IntraVENous PRN Kait Bobby MD   5 mL at 01/15/20 1529    ondansetron [facility]) injection 4 mg  4 mg IntraVENous Q4H PRN Melo Britton MD        aspirin chewable tablet 81 mg  81 mg Oral DAILY Melo Britton MD   81 mg at 20 0908    acetaminophen (TYLENOL) tablet 650 mg  650 mg Oral Q4H PRN Melo Britton MD   650 mg at 20 4278    enoxaparin (LOVENOX) injection 40 mg  40 mg SubCUTAneous Q24H Melo Britton MD   40 mg at 20 1445    dextrose 40% (GLUTOSE) oral gel 1 Tube  15 g Oral PRN Melo Britton MD        glucagon [facility]) injection 1 mg  1 mg IntraMUSCular PRN Melo Britton MD        dextrose (D50W) injection syrg 12.5-25 g  25-50 mL IntraVENous PRN Melo Britton MD           Review of Systems negative with exception of pertinent positives noted above  PHYSICAL EXAM     Visit Vitals  /79 (BP 1 Location: Right arm, BP Patient Position: At rest)   Pulse 69   Temp 98.3 °F (36.8 °C)   Resp 18   Ht 5' 6\" (1.676 m)   Wt 95.3 kg (210 lb)   SpO2 97%   BMI 33.89 kg/m²      Temp (24hrs), Av.3 °F (36.8 °C), Min:97.9 °F (36.6 °C), Max:98.8 °F (37.1 °C)    Oxygen Therapy  O2 Sat (%): 97 % (20 0800)  Pulse via Oximetry: 56 beats per minute (20 1918)  O2 Device: Room air (20 1930)  No intake or output data in the 24 hours ending 20 1049   General: No acute distress    Lungs: CTA bilaterally. Resp even and unlabored  Heart:  S1S2 present without murmurs rubs gallops. RRR. No LE edema  Abdomen: Soft, non tender, non distended. BS present  Extremities: No cyanosis. Left UE/LE weakness 1/5  Neurologic:  A/O X3. Dysarthria noted. Left facial droop. Sensation intact. LUE/LLE strength 2/5.       Results summary of Diagnostic Studies/Procedures copied from within [facility] EMR:        [address] Problems    Diagnosis Date Noted    PFO (patent foramen ovale) 2019    Arrhythmia 12/15/2019    Hyperlipemia     [facility]     Type 2 diabetes mellitus (HCC)     Hypertension      Plan:    -Acute embolic stroke: POA  Dysphagia   MRI head: with acute infarcts in L cerebellar hemisphere, deep frontoparietal white matter, and R paramedian yariel.  Also noted old lacunar infarcts. ISMAEL: 3 mm PFO, needs to f/u outpatient with Cardiology for evaluation for closure   Cont statin, ASA  PT/OT/Speech     -Hypertension: continue home meds      -Type 2 diabetes mellitus:  Metformin     -Arrhythmia: had brief wide complex tachycardia early in admission, will need event monitor as outpt per cards    DVT Prophylaxis: lovenox    DISPO: Difficult placement, case management working on plan. Patient has no medical insurance. Pt can't go home as pt's wife is disabled and is not safe at home without supervision as pt needs long term care.       Arleth Hernandez MD

## 2020-01-28 LAB — GLUCOSE BLD STRIP.AUTO-MCNC: 107 MG/DL (ref 65–100)

## 2020-01-28 PROCEDURE — 97530 THERAPEUTIC ACTIVITIES: CPT

## 2020-01-28 PROCEDURE — 74011250637 HC RX REV CODE- 250/637: Performed by: INTERNAL MEDICINE

## 2020-01-28 PROCEDURE — 97112 NEUROMUSCULAR REEDUCATION: CPT

## 2020-01-28 PROCEDURE — 65270000029 HC RM PRIVATE

## 2020-01-28 PROCEDURE — 74011250636 HC RX REV CODE- 250/636: Performed by: INTERNAL MEDICINE

## 2020-01-28 PROCEDURE — 74011250637 HC RX REV CODE- 250/637: Performed by: HOSPITALIST

## 2020-01-28 PROCEDURE — 97535 SELF CARE MNGMENT TRAINING: CPT

## 2020-01-28 PROCEDURE — 82962 GLUCOSE BLOOD TEST: CPT

## 2020-01-28 RX ADMIN — GUAIFENESIN 100 MG: 100 SOLUTION ORAL at 22:46

## 2020-01-28 RX ADMIN — METFORMIN HYDROCHLORIDE 500 MG: 500 TABLET, FILM COATED ORAL at 09:14

## 2020-01-28 RX ADMIN — ACETAMINOPHEN 650 MG: 325 TABLET, FILM COATED ORAL at 22:46

## 2020-01-28 RX ADMIN — ACETAMINOPHEN 650 MG: 325 TABLET, FILM COATED ORAL at 17:52

## 2020-01-28 RX ADMIN — ASPIRIN 81 MG 81 MG: 81 TABLET ORAL at 09:14

## 2020-01-28 RX ADMIN — LISINOPRIL 40 MG: 20 TABLET ORAL at 09:14

## 2020-01-28 RX ADMIN — ATORVASTATIN CALCIUM 80 MG: 80 TABLET, FILM COATED ORAL at 21:44

## 2020-01-28 RX ADMIN — ENOXAPARIN SODIUM 40 MG: 40 INJECTION SUBCUTANEOUS at 14:58

## 2020-01-28 RX ADMIN — METFORMIN HYDROCHLORIDE 500 MG: 500 TABLET, FILM COATED ORAL at 17:48

## 2020-01-28 RX ADMIN — GUAIFENESIN 100 MG: 100 SOLUTION ORAL at 17:52

## 2020-01-28 NOTE — PROGRESS NOTES
01/27/20 1910   NIH Stroke Scale   Interval Other (comment)   LOC 0   LOC Questions 0   LOC Commands 0   Best Gaze 0   Visual 0   Facial Palsy 1   Motor Right Arm 0   Motor Left Arm 2   Motor Right Leg 0   Motor Left Leg 2   Limb Ataxia 0   Sensory 0   Best Language 1   Dysarthria 1   Extinction and Inattention 0   Total 7

## 2020-01-28 NOTE — PROGRESS NOTES
Problem: Mobility Impaired (Adult and Pediatric)  Goal: *Acute Goals and Plan of Care  Description  Acute PT Goals (reviewed & updated 1/17/2020):  STG:  (1.) Mr. Ellis Nair will move from supine to sit and sit to supine , scoot up and down and roll side to side with CONTACT GUARD ASSIST within 3 treatment day(s). (2.) Mr. Ellis Nair will transfer from bed to chair and chair to bed with MODERATE ASSIST x1 using the least restrictive device within 3 treatment day(s). (3.) Mr. Ellis Nair will perform standing static and dynamic balance activities x 10 minutes with MODERATE ASSIST x1 to improve safety within 3 treatment day(s). LTG:  (1.) Mr. Ellis Nair will move from supine to sit and sit to supine , scoot up and down and roll side to side with STAND BY ASSIST within 7 treatment day(s). (2.) Mr. Ellis Nair will transfer from bed to chair and chair to bed with MINIMAL ASSIST using the least restrictive device within 7 treatment day(s). (3.) Mr. Ellis Nair will ambulate with MODERATE ASSIST for 20+ feet with the least restrictive device within 7 treatment day(s). (4.) Mr. Ellis Nair will perform standing static and dynamic balance activities x 20 minutes with MINIMAL ASSIST to improve safety within 7 treatment day(s). (5.) Mr. Ellis Nair will perform bilateral lower extremity exercises x 15 min for HEP with SUPERVISION to improve strength, endurance, and functional mobility within 7 treatment day(s).      PHYSICAL THERAPY: Daily Note and AM 1/28/2020  INPATIENT: PT Visit Days : 6  Payor: MEDICAID PENDING / Plan: Keri Rizvi PENDING / Product Type: Medicaid /       NAME/AGE/GENDER: Cristina Enriquez is a 53 y.o. male   PRIMARY DIAGNOSIS: Acute ischemic stroke ([address] [I63.9]  Cerebrovascular accident (CVA) due to embolism ([address] [P06.6] Acute embolic stroke ([address] Acute embolic stroke ([address]       ICD-10: Treatment Diagnosis:   · Other lack of cordination (R27.8)  · Difficulty in walking, Not elsewhere classified (R26.2)  · Other abnormalities of gait and mobility (R26.89)  · Unspecified Lack of Coordination (R27.9)  · Hemiplegia and hemiparesis following cerebral infarction affecting   · left non-dominant side (X90.985)   Precaution/Allergies:  Patient has no known allergies. ASSESSMENT:     Mr. Kyung Cisse is a 53year old admitted with acute CVA with left hemiparesis and left inattention. Patient was supine upon contact and agreeable to PT. Patient reports feeling better today compared to yesterday. atient performs supine to sit with mod assist, additional time, and cues to utilize compensatory strategies to assist with bed mobility. Poor carry-over from previous treatment about compensatory strategies. Patient sits EOB working on dynamic sitting balance with +LOB when working on balance to the left. Patient also performs several sit to stand transfers (x5) with cues for technique/hand placement. Once standing patient worked on pre-gait activity including weight shifts, static/dynamic standing balance activities. Patient demonstrates poor balance in standing as well as poor activity tolerance. Patient requested to return to supine post treatment. Attempted side steps towards EOB but patient experienced a MAJOR LOB resulting in therapist assisting patient to sitting safely on EOB. Patient returns to supine with mod assist. Overall slow progress towards physical therapy goals. Patient's goals listed above are still appropriate. Will continue skilled PT to address remaining deficits. This section established at most recent assessment   PROBLEM LIST (Impairments causing functional limitations):  1. Decreased Strength  2. Decreased ADL/Functional Activities  3. Decreased Transfer Abilities  4. Decreased Ambulation Ability/Technique  5. Decreased Balance  6. Increased Pain  7. Decreased Activity Tolerance  8. Increased Fatigue  9.  Decreased Flexibility/Joint Mobility   INTERVENTIONS PLANNED: (Benefits and precautions of physical therapy have been discussed with the patient.)  1. Balance Exercise  2. Bed Mobility  3. Family Education  4. Gait Training  5. Home Exercise Program (HEP)  6. Manual Therapy  7. Neuromuscular Re-education/Strengthening  8. Range of Motion (ROM)  9. Therapeutic Activites  10. Therapeutic Exercise/Strengthening  11. Transfer Training     TREATMENT PLAN: Frequency/Duration: 3 times a week for duration of hospital stay  Rehabilitation Potential For Stated Goals: Good     REHAB RECOMMENDATIONS (at time of discharge pending progress):    Placement: It is my opinion, based on this patient's performance to date, that Mr. Romina Orlando may benefit from intensive therapy at an [address] [place] [address] after discharge due to a probable need for 24 hour rehab nursing, a probable need for multiple therapy disciplines, and potential to make ongoing and sustainable functional improvement that is of practical value. .  Equipment:    TBD pending progress with therapy. HISTORY:   History of Present Injury/Illness (Reason for Referral):  Per H&P: \"Pt is a 54 y/o smoker with DM, HTN, who presented to ER with L leg and arm weakness, L facial droop, dysarthria. First noted L leg weakness late night 12/11 when he woke up to go to the bathroom. He was normal when he went to bed around 1030. Woke up this morning and had persistent weakness L leg and also now noted in L arm. EMS called. Noted to have slurred speech and L facial droop as well. Code S called in ER around 9am.  MRI with acute infarcts in L cerebellar hemisphere, deep frontoparietal white matter, and R paramedian yariel. Also noted old lacunar infarcts. No large vessel occlusion on CTA, but some stenosis noted. No hx afib, TIA, CVA. No CP, palpitations, SOB. \"  Past Medical History/Comorbidities:   Mr. Romina Orlando  has a past medical history of Acute ischemic stroke ([facility] (12/12/2019), Acute pancreatitis (11/19/2014), Cerebrovascular accident (CVA) due to embolism ([address] (12/12/2019), Diabetes ([address] (2002), Diabetes ([address] Diabetes mellitus, and Hypertension. He also has no past medical history of Arthritis, Asthma, Autoimmune disease ([address] CAD (coronary artery disease), Cancer ([address] Chronic kidney disease, COPD, Dementia, Dementia ([address] Heart failure ([address] Ill-defined condition, Infectious disease, Liver disease, Other ill-defined conditions(799.89), Psychiatric disorder, PUD (peptic ulcer disease), Seizures ([address] or Sleep disorder. Mr. Karlos Alvarenga  has a past surgical history that includes hx hernia repair and hx orthopaedic. Social History/Living Environment:   Home Environment: Apartment  # Steps to Enter: 12  Rails to Enter: Yes  [employer] : Bilateral  One/Two Story Residence: One story  Living Alone: No  Support Systems: Spouse/Significant Other/Partner  Patient Expects to be Discharged to[de-identified] Unknown  Current DME Used/Available at Home: None  Tub or Shower Type: Tub/Shower combination  Prior Level of Function/Work/Activity:  Independent, lives with wife in 2nd story 1 level apartment. No recent falls. Number of Personal Factors/Comorbidities that affect the Plan of Care: 1-2: MODERATE COMPLEXITY   EXAMINATION:   Most Recent Physical Functioning:   Gross Assessment:                  Posture:     Balance:  Sitting: Impaired  Sitting - Static: Fair (occasional)  Sitting - Dynamic: Fair (occasional)  Standing: Impaired  Standing - Static: Poor  Standing - Dynamic : Poor Bed Mobility:  Supine to Sit: Moderate assistance  Sit to Supine: Moderate assistance  Wheelchair Mobility:     Transfers:  Sit to Stand: Moderate assistance;Maximum assistance  Stand to Sit: Moderate assistance;Maximum assistance  Gait:            Body Structures Involved:  1. Nerves  2. Voice/Speech  3. Bones  4. Joints  5. Muscles Body Functions Affected:  1. Mental  2. Sensory/Pain  3. Neuromusculoskeletal  4. Movement Related Activities and Participation Affected:  1.  General Tasks and Demands  2. Mobility  3. Self Care  4. Interpersonal Interactions and Relationships   Number of elements that affect the Plan of Care: 4+: HIGH COMPLEXITY   CLINICAL PRESENTATION:   Presentation: Evolving clinical presentation with changing clinical characteristics: MODERATE COMPLEXITY   CLINICAL DECISION MAKIN [facility] Inpatient Short Form  How much difficulty does the patient currently have. .. Unable A Lot A Little None   1. Turning over in bed (including adjusting bedclothes, sheets and blankets)? [] 1   [] 2   [x] 3   [] 4   2. Sitting down on and standing up from a chair with arms ( e.g., wheelchair, bedside commode, etc.)   [] 1   [x] 2   [] 3   [] 4   3. Moving from lying on back to sitting on the side of the bed? [] 1   [] 2   [x] 3   [] 4   How much help from another person does the patient currently need. .. Total A Lot A Little None   4. Moving to and from a bed to a chair (including a wheelchair)? [] 1   [x] 2   [] 3   [] 4   5. Need to walk in hospital room? [] 1   [x] 2   [] 3   [] 4   6. Climbing 3-5 steps with a railing? [] 1   [x] 2   [] 3   [] 4   © , Trustees of [address] [place] [address] [address] [id number], under license to [employer]. All rights reserved      Score:  Initial: 13 Most Recent: 14 (Date:2020)    Interpretation of Tool:  Represents activities that are increasingly more difficult (i.e. Bed mobility, Transfers, Gait). Medical Necessity:     · Patient is expected to demonstrate progress in   · strength, range of motion, balance, coordination, and functional technique  ·  to   · increase independence with all mobility. · .  Reason for Services/Other Comments:  · Patient continues to require skilled intervention due to   · medical complications and mobility deficits which impact his level of function, safety, and independence as indicated above.    · .   Use of outcome tool(s) and clinical judgement create a POC that gives a: Questionable prediction of patient's progress: MODERATE COMPLEXITY        TREATMENT:      Pre-treatment Symptoms/Complaints:  Fatigued  Pain: Initial: None Post Session:  None     Therapeutic Activity: (    23 minutes): Therapeutic activities including bed mobility training including supine <-> sit, transfer training x5 reps, static/dynamic sitting/standing balance, scooting, posture training, LE positioning, pre-gait activity, and patient education to improve mobility, strength, balance and coordination. Required moderate verbal, tactile and manual cues   to promote dynamic balance in standing and promote coordination of bilateral, lower extremity(s). Therapeutic Exercise: (   minutes):  Exercises per grid below to improve mobility and strength. Required moderate visual, verbal and manual cues to promote proper body posture and promote proper body mechanics. Progressed range and repetitions as indicated. Date:  1/21/20 Date:  1/24/20 Date:     ACTIVITY/EXERCISE AM PM AM PM AM PM   Ambulation:           Distance  Device  Duration         Seated Heel Raises X 10 B, P-L  X 10 B, P-L      Seated Toe Raises X 10 B, P-L  X 10 B, P-L      Seated Long Arc Quads X 10 B, AA-L  X 10 B, AA-L      Seated Marching X 10 B, AA-L  X 10 B, AA-L      Seated knee squeezes X 15 B, AA-L  X 10 B, AA-L               B = bilateral; AA = active assistive; A = active; P = passive              Braces/Orthotics/Lines/Etc:   · O2 Device: Room Air   Treatment/Session Assessment:    · Response to Treatment:  See above  · Interdisciplinary Collaboration:   o Physical Therapy Assistant  o Registered Nurse  · After treatment position/precautions:   o Supine in bed  o Bed alarm/tab alert on  o Bed/Chair-wheels locked  o Bed in low position  o Call light within reach  o RN notified   · Compliance with Program/Exercises: Compliant all of the time  · Recommendations/Intent for next treatment session:   \"Next visit will focus on advancements to more challenging activities and reduction in assistance provided\".     Total Treatment Duration:  PT Patient Time In/Time Out  Time In: 0855  Time Out: [address]  [facility]

## 2020-01-28 NOTE — PROGRESS NOTES
01/28/20 0723   NIH Stroke Scale   Interval Other (comment)  (Dual [facility] with Margarita Coughlin RN )   LOC 0   LOC Questions 0   LOC Commands 0   Best Gaze 0   Visual 0   Facial Palsy 1   Motor Right Arm 0   Motor Left Arm 2   Motor Right Leg 0   Motor Left Leg 2   Limb Ataxia 0   Sensory 0   Best Language 1   Dysarthria 1   Extinction and Inattention 0   Total 7

## 2020-01-28 NOTE — PROGRESS NOTES
Progress Note    2020  Admit Date: 2019  9:07 AM   NAME: Priti Kelly   :  1975   MRN:  [id number]   Attending: Connor Artis MD  PCP:  Zheng Urban MD  Treatment Team: Attending Provider: Connor Artis MD; Care Manager: Christina Salcedo, RN; Care Manager: Royce Ramirez, LMSW; Consulting Provider: Beatriz Marin MD; Utilization Review: Mayuri Alonso RN; Nurse Practitioner: Aurora Trujillo NP; Charge Nurse: Yves Dooley; Physical Therapy Assistant: Kris Leahy PTA; Occupational Therapist: Melania Santa OT    Full Code     This is a 52 yo male with PMH of DM II, HTN who presented 19 with c/o left leg and arm weakness, left facial droop and dysarthria. Code S called. MRI with acute infarctions in left cerebellar hemisphere, deep frontoparietal white matter and right paramedian yariel. Also noted to have old lacunar infarcts. CTA without large vessel occlusions, however some stenosis noted. Echo showed PFO, Cardiology to f/u outpatient for evaluation for closure. Neurology consulted. Started on ASA, statin. Pt is uninsured, difficult placement, case management working on plan. SUBJECTIVE:     Pt reports feeling okay, weakness in left upper and lower extremity is stable, no worsening. No fever, diarrhea, nausea/vomiting, chills, chest pain, palpitations. ROS: 10 point ROS negative except what mentioned above.     Past Medical History:   Diagnosis Date    Acute ischemic stroke ([address] 2019    Acute pancreatitis 2014    Cerebrovascular accident (CVA) due to embolism ([address] 2019    Diabetes ([address] 2002    Diabetes ([address]     Diabetes mellitus     Hypertension      Recent Results (from the past 24 hour(s))   GLUCOSE, POC    Collection Time: 20  7:48 AM   Result Value Ref Range    Glucose (POC) 107 (H) 65 - 100 mg/dL     No Known Allergies  Current Facility-Administered Medications   Medication Dose Route Frequency Provider Last Rate Last Dose    metFORMIN (GLUCOPHAGE) tablet 500 mg  500 mg Oral BID WITH MEALS Kailash Bishop MD   500 mg at 01/27/20 1725    metoprolol succinate (TOPROL-XL) XL tablet 25 mg  25 mg Oral DAILY Kailash Bishop MD   25 mg at 01/27/20 0819    polyethylene glycol (MIRALAX) packet 17 g  17 g Oral DAILY PRN Salazar Maguire C, DO   17 g at 01/09/20 7326    guaiFENesin (ROBITUSSIN) 100 mg/5 mL oral liquid 100 mg  100 mg Oral Q4H PRN Kailash Bishop MD   100 mg at 01/27/20 2036    hydrALAZINE (APRESOLINE) 20 mg/mL injection 20 mg  20 mg IntraVENous Q4H PRN Laina Adams MD   20 mg at 12/23/19 0133    atorvastatin (LIPITOR) tablet 80 mg  80 mg Oral QHS Laina Adams MD   80 mg at 01/27/20 2036    lisinopril (PRINIVIL, ZESTRIL) tablet 40 mg  40 mg Oral DAILY Laina Adams MD   40 mg at 01/27/20 4071    sodium chloride (NS) flush 5-40 mL  5-40 mL IntraVENous PRN Laina Adams MD   5 mL at 01/15/20 1529    ondansetron (ZOFRAN) injection 4 mg  4 mg IntraVENous Q4H PRN Laina Adams MD        aspirin chewable tablet 81 mg  81 mg Oral DAILY Laina Adams MD   81 mg at 01/27/20 1210    acetaminophen (TYLENOL) tablet 650 mg  650 mg Oral Q4H PRN Laina Adams MD   650 mg at 01/27/20 2036    enoxaparin (LOVENOX) injection 40 mg  40 mg SubCUTAneous Q24H Laina Adams MD   40 mg at 01/27/20 1451    dextrose 40% (GLUTOSE) oral gel 1 Tube  15 g Oral PRN Laina Adams MD        glucagon [facility]) injection 1 mg  1 mg IntraMUSCular PRN Laina Adams MD        dextrose (D50W) injection syrg 12.5-25 g  25-50 mL IntraVENous PRN Laina Adams MD           Review of Systems negative with exception of pertinent positives noted above  PHYSICAL EXAM     Visit Vitals  /85 (BP 1 Location: Right arm, BP Patient Position: At rest)   Pulse 66   Temp 97.8 °F (36.6 °C)   Resp 17   Ht 5' 6\" (1.676 m)   Wt 95.3 kg (210 lb)   SpO2 100%   BMI 33.89 kg/m²      Temp (24hrs), Av.2 °F (36.8 °C), Min:97.8 °F (36.6 °C), Max:98.8 °F (37.1 °C)    Oxygen Therapy  O2 Sat (%): 100 % (20 0400)  Pulse via Oximetry: 56 beats per minute (20)  O2 Device: Room air (20)  No intake or output data in the 24 hours ending 20 08   General: No acute distress    Lungs: CTA bilaterally. Resp even and unlabored  Heart:  S1S2 present without murmurs rubs gallops. RRR. No LE edema  Abdomen: Soft, non tender, non distended. BS present  Extremities: No cyanosis. Left UE/LE weakness 1/5  Neurologic:  A/O X3. Dysarthria noted. Left facial droop. Sensation intact. LUE/LLE strength 2/5. Results summary of Diagnostic Studies/Procedures copied from within [facility] EMR:        Edmar Dixon 96 Problems    Diagnosis Date Noted    PFO (patent foramen ovale) 2019    Arrhythmia 12/15/2019    Hyperlipemia 5274    Acute embolic stroke [facility]     Type 2 diabetes mellitus (HCC)     Hypertension      Plan:    -Acute embolic stroke: POA  Dysphagia   MRI head: with acute infarcts in L cerebellar hemisphere, deep frontoparietal white matter, and R paramedian yariel.  Also noted old lacunar infarcts. ISMAEL: 3 mm PFO, needs to f/u outpatient with Cardiology for evaluation for closure   Cont statin, ASA  PT/OT/Speech     -Hypertension: continue home meds      -Type 2 diabetes mellitus:  Metformin     -Arrhythmia: had brief wide complex tachycardia early in admission, will need event monitor as outpt per cards    DVT Prophylaxis: lovenox    DISPO: Difficult placement, case management working on plan. Patient has no medical insurance. Pt can't go home as pt's wife is disabled and is not safe at home without supervision as pt needs long term care.       Malinda Gregory MD

## 2020-01-28 NOTE — PROGRESS NOTES
Problem: Self Care Deficits Care Plan (Adult)  Goal: *Acute Goals and Plan of Care (Insert Text)  Description  GOALS UPDATED 1/22/2020:   1. Patient will complete dynamic sitting balance for ADLs with supervision. 2. Patient will demonstrate appropriate safety awareness and protection of L UE during bed mobility and functional transfers with minimal cues. 3. Patient will complete total body bathing and dressing with moderate assistance and adaptive equipment as needed. 4. Patient will complete weightbearing into the L UE with ADL tasks with minimal assistance to improve ability to use as a functional assist during ADL tasks. 5. Patient will demonstrate modified independence with therapeutic exercises to improve strength in L UE for ADLs/functional transfers. 6. Patient will complete bed mobility with stand by assistance and adaptive equipment as needed in preparation for functional transfers. 7. Patient will complete functional transfers with contact guard assistance and adaptive equipment as needed. 8. Patient will complete functional mobility for ADLs with moderate assistance and adaptive equipment as needed.      Timeframe: 7 visits       Outcome: Progressing Towards Goal     OCCUPATIONAL THERAPY: Daily Note and PM    1/28/2020  INPATIENT: OT Visit Days: 4  Payor: MEDICAID PENDING / [facility] / Product Type: Medicaid /      NAME/AGE/GENDER: eKrvin Milian is a 53 y.o. male   PRIMARY DIAGNOSIS:  Acute ischemic stroke ([facility] [I63.9]  Cerebrovascular accident (CVA) due to embolism ([facility] [U55.4] Acute embolic stroke ([facility] Acute embolic stroke ([facility]       ICD-10: Treatment Diagnosis:    · Generalized Muscle Weakness (M62.81)  · Other lack of cordination (R27.8)  · Hemiplegia and hemiparesis following cerebral infarction affecting   · left non-dominant side (I69.354)  · Abnormal posture (R29.3)   Precautions/Allergies:    NO PULLING ON LUE   LUE in sling with shoulder joint approximated and supported   Patient has no known allergies. ASSESSMENT:     Mr. Anjel Gil is a 53 y.o. male presents to the hospital with L sided weakness and acute ischemic CVA. MRI revealed acute to subacute L cerebellar and R paramedian yariel infarcts. At baseline pt lives with his wife and is independent. One finger wide subluxation noted in L shoulder (1/22/2020). 1/28/2020  Patient supine in bed, ready for therapy. Patient reported that the Kinesiotape on L shoulder for support/proprioception/ pain relief has decreased his L shoulder pain. Removed KT from L upper trap, X strip from L rhomboids, and I strip L shoulder wrap around was removed by nursing yesterday. Skin had minor irritation just below upper trapezius mm. Placed I strip KT onto upper trapezius proximal to skin irritation, and placed I strip on L upper back area to support scapula region. Will wait to do I strip wrap around until Thursday to give skin time a break from tape. Worked on L UE [employer] with weight shift this date, no active movement noted. Patient bathed self with overall maximal assist, and required total assist with shampoo shower cap. Maximal assistance for [facility] gown. Patient stood with moderate assistance x 2 trials; he has a tendency to hyperextend L knee ( L UE supported in sling). NDT input to L knee to facilitate muscles and prevent L knee hyperextension & prevent buckling L knee. Patient took a couple of steps to left with moderate assistance x 2 using hemicane. Patient sat with moderate assistance x 2, and he returned to supine with moderate assistance. Reiterated KT precautions to patient, staff, & family. Patient highly motivated, and he is a great rehab candidate. Slow, steady progress noted this date. Cont OT per tx plan. This section established at most recent assessment   PROBLEM LIST (Impairments causing functional limitations):  1. Decreased Strength  2.  Decreased ADL/Functional Activities  3. Decreased Transfer Abilities  4. Decreased Ambulation Ability/Technique  5. Decreased Balance  6. Decreased Activity Tolerance  7. Increased Fatigue  8. Decreased Flexibility/Joint Mobility  9. Decreased Knowledge of Precautions  10. Decreased [place] with Home Exercise Program   INTERVENTIONS PLANNED: (Benefits and precautions of occupational therapy have been discussed with the patient.)  1. Activities of daily living training  2. Adaptive equipment training  3. Balance training  4. Clothing management  5. Cognitive training  6. Donning&doffing training  7. Keanu tech training  8. Neuromuscular re-eduation  9. Therapeutic activity  10. Therapeutic exercise     TREATMENT PLAN: Frequency/Duration: Follow patient 3 times per week to address above goals. Rehabilitation Potential For Stated Goals: Excellent     REHAB RECOMMENDATIONS (at time of discharge pending progress):    Placement: It is my opinion, based on this patient's performance to date, that Mr. Elvin Farooq may benefit from intensive therapy at an [address] [place] [address] after discharge due to potential to make ongoing and sustainable functional improvement that is of practical value. Garcia Reeves Pt functioning far below independent baseline, demonstrating good improvement and participation. Pt would likely benefit greatly and increase independence from inpatient rehab stay. Equipment:    TBD               OCCUPATIONAL PROFILE AND HISTORY:   History of Present Injury/Illness (Reason for Referral):  See H&P  Past Medical History/Comorbidities:   Mr. Elvin Farooq  has a past medical history of Acute ischemic stroke ([address] (12/12/2019), Acute pancreatitis (11/19/2014), Cerebrovascular accident (CVA) due to embolism ([address] (12/12/2019), Diabetes ([address] (2002), Diabetes ([address] Diabetes mellitus, and Hypertension.  He also has no past medical history of Arthritis, Asthma, Autoimmune disease ([address] CAD (coronary artery disease), Cancer ([address] Chronic kidney disease, COPD, Dementia, Dementia ([facility] Heart failure ([facility] Ill-defined condition, Infectious disease, Liver disease, Other ill-defined conditions(799.89), Psychiatric disorder, PUD (peptic ulcer disease), Seizures ([facility] or Sleep disorder. Mr. Ana Olson  has a past surgical history that includes hx hernia repair and hx orthopaedic. Social History/Living Environment:   Home Environment: Apartment  # Steps to Enter: 12  Rails to Enter: Yes  [employer] : Bilateral  One/Two Story Residence: One story  Living Alone: No  Support Systems: Spouse/Significant Other/Partner  Patient Expects to be Discharged to[de-identified] Unknown  Current DME Used/Available at Home: None  Tub or Shower Type: Tub/Shower combination  Prior Level of Function/Work/Activity:  Pt lives at home with his wife. Pt is typically independent with ADL/functional mobility. Pt does not drive. Pt was working part-time at [employer]. Personal Factors:          Age:  53 y.o. Past/Current Experience:  CVA with flaccid L side        Other factors that influence how disability is experienced by the patient:  multiple co-morbidities    Number of Personal Factors/Comorbidities that affect the Plan of Care: Extensive review of physical, cognitive, and psychosocial performance (3+):  HIGH COMPLEXITY   ASSESSMENT OF OCCUPATIONAL PERFORMANCE[de-identified]   Activities of Daily Living:   Basic ADLs (From Assessment) Complex ADLs (From Assessment)   Feeding: Setup  Oral Facial Hygiene/Grooming: Moderate assistance  Bathing: Maximum assistance  Upper Body Dressing: Maximum assistance  Lower Body Dressing: Total assistance  Toileting: Maximum assistance Instrumental ADL  Meal Preparation: Total assistance  Homemaking: Total assistance   Grooming/Bathing/Dressing Activities of Daily Living         Upper Body Bathing  Bathing Assistance:  Moderate assistance  Position Performed: Seated edge of bed  Cues: Tactile cues provided;Visual cues provided;Visual/perceptual training/retraining     Lower Body Bathing  Bathing Assistance: Maximum assistance  Position Performed: Other(seated on edge of bed)  Cues: Tactile cues provided;Verbal cues provided;Visual/perceptual training/retraining     Upper Body [address]: Maximum assistance       Bed/Mat Mobility  Supine to Sit: Moderate assistance  Sit to Supine: Moderate assistance  Sit to Stand: Moderate assistance;Maximum assistance  Stand to Sit: Moderate assistance;Maximum assistance     Most Recent Physical Functioning:   Gross Assessment:                  Posture:     Balance:  Sitting: Impaired  Sitting - Static: Fair (occasional)  Sitting - Dynamic: Fair (occasional)  Standing: Impaired  Standing - Static: Poor  Standing - Dynamic : Poor Bed Mobility:  Supine to Sit: Moderate assistance  Sit to Supine: Moderate assistance  Wheelchair Mobility:     Transfers:  Sit to Stand: Moderate assistance;Maximum assistance  Stand to Sit: Moderate assistance;Maximum assistance            Patient Vitals for the past 6 hrs:   BP BP Patient Position SpO2 Pulse   01/28/20 1200 133/87 At rest 100 % 63       Mental Status  Neurologic State: Alert  Orientation Level: Oriented X4  Cognition: Follows commands  Perception: Cues to maintain midline in standing  Perseveration: No perseveration noted  Safety/Judgement: Awareness of environment, Fall prevention, Insight into deficits                          Physical Skills Involved:  1. Range of Motion  2. Balance  3. Strength  4. Activity Tolerance  5. Fine Motor Control  6. Gross Motor Control Cognitive Skills Affected (resulting in the inability to perform in a timely and safe manner):  1. Perception  2. Expression Psychosocial Skills Affected:  1. Habits/Routines  2. Environmental Adaptation  3. Social Interaction  4. Emotional Regulation  5. Self-Awareness  6. Awareness of Others  7.  Social Roles   Number of elements that affect the Plan of Care: 5+:  HIGH COMPLEXITY   CLINICAL DECISION MAKING:   [facility] AM-PAC 6 Clicks   Daily Activity Inpatient Short Form  How much help from another person does the patient currently need. .. Total A Lot A Little None   1. Putting on and taking off regular lower body clothing? [x] 1   [] 2   [] 3   [] 4   2. Bathing (including washing, rinsing, drying)? [] 1   [x] 2   [] 3   [] 4   3. Toileting, which includes using toilet, bedpan or urinal?   [] 1   [x] 2   [] 3   [] 4   4. Putting on and taking off regular upper body clothing? [] 1   [x] 2   [] 3   [] 4   5. Taking care of personal grooming such as brushing teeth? [] 1   [x] 2   [] 3   [] 4   6. Eating meals? [] 1   [] 2   [x] 3   [] 4   © 2007, Trustees of [facility], under license to [employer]. All rights reserved      Score:  Initial: 11 Most Recent: 12 (Date: 1/22/2020 )    Interpretation of Tool:  Represents activities that are increasingly more difficult (i.e. Bed mobility, Transfers, Gait). Medical Necessity:     · Patient demonstrates   · good and excellent  ·  rehab potential due to higher previous functional level. Reason for Services/Other Comments:  · Patient continues to require skilled intervention due to   · Decreased independence with ADL/functional transfers that impacts overall quality of life. · .   Use of outcome tool(s) and clinical judgement create a POC that gives a: MODERATE COMPLEXITY         TREATMENT:   (In addition to Assessment/Re-Assessment sessions the following treatments were rendered)     Pre-treatment Symptoms/Complaints:    Pain: Initial:      Post Session:  0     Self Care: (25 minutes): Procedure(s) (per grid) utilized to improve and/or restore self-care/home management as related to dressing and bathing. Required maximal visual, verbal and physical assistance cueing to facilitate activities of daily living skills and compensatory activities.   Neuromuscular Re-education: ( 30 minutes):  Exercise/activities per grid below to improve balance, coordination, kinesthetic sense, posture and proprioception. Required moderate x 2 visual, verbal and manual cues to promote static and dynamic balance in standing and sitting. Date:  1/27/2020 Date:   Date:     Activity/Exercise Parameters Parameters Parameters   LUE finger flexion, AROM gravity minimized then AAROM to complete ROM 2x10 reps     LUE finger extension AAROM 2x10 reps     LUE wrist extension, AROM gravity minimized then AAROM to complete ROM 1x10 reps     LUE wrist extension, AROM against gravity  1x10 reps     LUE wrist flexion AAROM 2x10 reps     LUE elbow flexion/extension PROM x10 reps     LUE shoulder flexion PROM x10 reps                      Braces/Orthotics/Lines/Etc:   · LUE sling   · O2 device: Room air  · Treatment/Session Assessment:    · Response to Treatment:  Pt demonstrated good participation. · Interdisciplinary Collaboration:   o Rehabilitation Attendant  o Certified Nursing Assistant/Patient Care Technician  · After treatment position/precautions:   o Supine in bed  o Bed alarm/tab alert on  o Bed/Chair-wheels locked  o Call light within reach  o RN notified  o Family at bedside  o PCT at bedside   · Compliance with Program/Exercises: Compliant all of the time, Will assess as treatment progresses. · Recommendations/Intent for next treatment session: \"Next visit will focus on advancements to more challenging activities and reduction in assistance provided\".   Total Treatment Duration:  OT Patient Time In/Time Out  Time In: 1320  Time Out: Doretha Mccann 303, OT

## 2020-01-28 NOTE — PROGRESS NOTES
Problem: Pressure Injury - Risk of  Goal: *Prevention of pressure injury  Description  Document Dewey Scale and appropriate interventions in the flowsheet. Outcome: Progressing Towards Goal  Note: Pressure Injury Interventions:  Sensory Interventions: Assess changes in LOC, Discuss PT/OT consult with provider, Float heels, Keep linens dry and wrinkle-free, Maintain/enhance activity level, Minimize linen layers, Pressure redistribution bed/mattress (bed type), Turn and reposition approx. every two hours (pillows and wedges if needed)    Moisture Interventions: Absorbent underpads, Apply protective barrier, creams and emollients    Activity Interventions: Increase time out of bed, Pressure redistribution bed/mattress(bed type), PT/OT evaluation    Mobility Interventions: HOB 30 degrees or less, Pressure redistribution bed/mattress (bed type), PT/OT evaluation, Suspension boots, Turn and reposition approx. every two hours(pillow and wedges)    Nutrition Interventions: Document food/fluid/supplement intake, Offer support with meals,snacks and hydration    Friction and Shear Interventions: HOB 30 degrees or less                Problem: Patient Education: Go to Patient Education Activity  Goal: Patient/Family Education  Outcome: Progressing Towards Goal     Problem: Falls - Risk of  Goal: *Absence of Falls  Description  Document Jeanne Fall Risk and appropriate interventions in the flowsheet.   Outcome: Progressing Towards Goal  Note: Fall Risk Interventions:  Mobility Interventions: Bed/chair exit alarm, OT consult for ADLs, Patient to call before getting OOB, PT Consult for mobility concerns    Mentation Interventions: Bed/chair exit alarm, Door open when patient unattended    Medication Interventions: Bed/chair exit alarm, Evaluate medications/consider consulting pharmacy, Patient to call before getting OOB, Teach patient to arise slowly    Elimination Interventions: Bed/chair exit alarm, Call light in reach, Patient to call for help with toileting needs, Stay With Me (per policy), Urinal in reach    History of Falls Interventions: Bed/chair exit alarm, Door open when patient unattended, Investigate reason for fall         Problem: Patient Education: Go to Patient Education Activity  Goal: Patient/Family Education  Outcome: Progressing Towards Goal     Problem: Diabetes Self-Management  Goal: *Disease process and treatment process  Description  Define diabetes and identify own type of diabetes; list 3 options for treating diabetes. Outcome: Progressing Towards Goal  Goal: *Incorporating nutritional management into lifestyle  Description  Describe effect of type, amount and timing of food on blood glucose; list 3 methods for planning meals. Outcome: Progressing Towards Goal  Goal: *Incorporating physical activity into lifestyle  Description  State effect of exercise on blood glucose levels. Outcome: Progressing Towards Goal  Goal: *Developing strategies to promote health/change behavior  Description  Define the ABC's of diabetes; identify appropriate screenings, schedule and personal plan for screenings. Outcome: Progressing Towards Goal  Goal: *Using medications safely  Description  State effect of diabetes medications on diabetes; name diabetes medication taking, action and side effects. Outcome: Progressing Towards Goal  Goal: *Monitoring blood glucose, interpreting and using results  Description  Identify recommended blood glucose targets  and personal targets. Outcome: Progressing Towards Goal  Goal: *Prevention, detection, treatment of acute complications  Description  List symptoms of hyper- and hypoglycemia; describe how to treat low blood sugar and actions for lowering  high blood glucose level.   Outcome: Progressing Towards Goal  Goal: *Prevention, detection and treatment of chronic complications  Description  Define the natural course of diabetes and describe the relationship of blood glucose levels to long term complications of diabetes.   Outcome: Progressing Towards Goal  Goal: *Developing strategies to address psychosocial issues  Description  Describe feelings about living with diabetes; identify support needed and support network  Outcome: Progressing Towards Goal     Problem: Patient Education: Go to Patient Education Activity  Goal: Patient/Family Education  Outcome: Progressing Towards Goal     Problem: General Medical Care Plan  Goal: *Vital signs within specified parameters  Outcome: Progressing Towards Goal  Goal: *Labs within defined limits  Outcome: Progressing Towards Goal  Goal: *Absence of infection signs and symptoms  Description  Wash hand more often   Outcome: Progressing Towards Goal  Goal: *Optimal pain control at patient's stated goal  Outcome: Progressing Towards Goal  Goal: *Skin integrity maintained  Outcome: Progressing Towards Goal  Goal: *Fluid volume balance  Outcome: Progressing Towards Goal  Goal: *Optimize nutritional status  Outcome: Progressing Towards Goal  Goal: *Anxiety reduced or absent  Outcome: Progressing Towards Goal  Goal: *Progressive mobility and function (eg: ADL's)  Outcome: Progressing Towards Goal     Problem: Patient Education: Go to Patient Education Activity  Goal: Patient/Family Education  Outcome: Progressing Towards Goal     Problem: Patient Education: Go to Patient Education Activity  Goal: Patient/Family Education  Outcome: Progressing Towards Goal     Problem: Pain  Goal: *Control of Pain  Outcome: Progressing Towards Goal     Problem: Patient Education: Go to Patient Education Activity  Goal: Patient/Family Education  Outcome: Progressing Towards Goal

## 2020-01-29 LAB — GLUCOSE BLD STRIP.AUTO-MCNC: 75 MG/DL (ref 65–100)

## 2020-01-29 PROCEDURE — 82962 GLUCOSE BLOOD TEST: CPT

## 2020-01-29 PROCEDURE — 97112 NEUROMUSCULAR REEDUCATION: CPT

## 2020-01-29 PROCEDURE — 97164 PT RE-EVAL EST PLAN CARE: CPT

## 2020-01-29 PROCEDURE — 65270000029 HC RM PRIVATE

## 2020-01-29 PROCEDURE — 74011250636 HC RX REV CODE- 250/636: Performed by: INTERNAL MEDICINE

## 2020-01-29 PROCEDURE — 74011250637 HC RX REV CODE- 250/637: Performed by: HOSPITALIST

## 2020-01-29 PROCEDURE — 92507 TX SP LANG VOICE COMM INDIV: CPT

## 2020-01-29 PROCEDURE — 74011250637 HC RX REV CODE- 250/637: Performed by: INTERNAL MEDICINE

## 2020-01-29 RX ADMIN — METOPROLOL SUCCINATE 25 MG: 25 TABLET, FILM COATED, EXTENDED RELEASE ORAL at 08:36

## 2020-01-29 RX ADMIN — ACETAMINOPHEN 650 MG: 325 TABLET, FILM COATED ORAL at 16:43

## 2020-01-29 RX ADMIN — LISINOPRIL 40 MG: 20 TABLET ORAL at 08:37

## 2020-01-29 RX ADMIN — GUAIFENESIN 100 MG: 100 SOLUTION ORAL at 16:43

## 2020-01-29 RX ADMIN — METFORMIN HYDROCHLORIDE 500 MG: 500 TABLET, FILM COATED ORAL at 08:36

## 2020-01-29 RX ADMIN — Medication 10 ML: at 21:11

## 2020-01-29 RX ADMIN — ATORVASTATIN CALCIUM 80 MG: 80 TABLET, FILM COATED ORAL at 21:11

## 2020-01-29 RX ADMIN — METFORMIN HYDROCHLORIDE 500 MG: 500 TABLET, FILM COATED ORAL at 16:38

## 2020-01-29 RX ADMIN — ENOXAPARIN SODIUM 40 MG: 40 INJECTION SUBCUTANEOUS at 13:51

## 2020-01-29 RX ADMIN — ASPIRIN 81 MG 81 MG: 81 TABLET ORAL at 08:37

## 2020-01-29 NOTE — PROGRESS NOTES
Progress Note    2020  Admit Date: 2019  9:07 AM   NAME: Calin Yen   :  1975   MRN:  [id number]   Attending: Carla Conklin MD  PCP:  Keyana Dutton MD  Treatment Team: Attending Provider: Carla Conklin MD; Care Manager: Cruz Zayas RN; Care Manager: Dk Simon, ELLEN; Consulting Provider: Olamide Hutchison MD; Utilization Review: Zahra Bush RN; Nurse Practitioner: Faith Aponte NP; Speech Language Pathologist: Georgie Lindsey, HYACINTH; Charge Nurse: Alfonso Vieyra Nurse: Farideh Carrington    Full Code     This is a 52 yo male with PMH of DM II, HTN who presented 19 with c/o left leg and arm weakness, left facial droop and dysarthria. Code S called. MRI with acute infarctions in left cerebellar hemisphere, deep frontoparietal white matter and right paramedian yariel. Also noted to have old lacunar infarcts. CTA without large vessel occlusions, however some stenosis noted. Echo showed PFO, Cardiology to f/u outpatient for evaluation for closure. Neurology consulted. Started on ASA, statin. Pt is uninsured, difficult placement, case management working on plan. SUBJECTIVE:     Patient reports feeling okay complains of mild pain in left upper extremity this morning. Left upper extremity is in a sling. No fever, diarrhea, nausea/vomiting, chills, chest pain, palpitations. ROS: 10 point ROS negative except what mentioned above.     Past Medical History:   Diagnosis Date    Acute ischemic stroke ([address] 2019    Acute pancreatitis 2014    Cerebrovascular accident (CVA) due to embolism ([address] 2019    Diabetes ([facility] [facility] 2002    Diabetes ([facility] [facility]     Diabetes mellitus     Hypertension      Recent Results (from the past 24 hour(s))   GLUCOSE, POC    Collection Time: 20  7:48 AM   Result Value Ref Range    Glucose (POC) 107 (H) 65 - 100 mg/dL   GLUCOSE, POC    Collection Time: 20  7:06 AM   Result Value Ref Range    Glucose (POC) 75 65 - 100 mg/dL     No Known Allergies  Current Facility-Administered Medications   Medication Dose Route Frequency Provider Last Rate Last Dose    metFORMIN (GLUCOPHAGE) tablet 500 mg  500 mg Oral BID WITH MEALS Jalil Dawkins MD   500 mg at 01/28/20 1748    metoprolol succinate (TOPROL-XL) XL tablet 25 mg  25 mg Oral DAILY Jalil Dawkins MD   Stopped at 01/28/20 0913    polyethylene glycol (MIRALAX) packet 17 g  17 g Oral DAILY PRN Justen TARIQ DO   17 g at 01/09/20 2348    guaiFENesin (ROBITUSSIN) 100 mg/5 mL oral liquid 100 mg  100 mg Oral Q4H PRN Jalil Dawkins MD   100 mg at 01/28/20 2246    hydrALAZINE (APRESOLINE) 20 mg/mL injection 20 mg  20 mg IntraVENous Q4H PRN Teodora Estes MD   20 mg at 12/23/19 0133    atorvastatin (LIPITOR) tablet 80 mg  80 mg Oral QHS Teodora Estes MD   80 mg at 01/28/20 2144    lisinopril (PRINIVIL, ZESTRIL) tablet 40 mg  40 mg Oral DAILY Teodora Estes MD   40 mg at 01/28/20 0914    sodium chloride (NS) flush 5-40 mL  5-40 mL IntraVENous PRN Teodora Estes MD   5 mL at 01/15/20 1529    ondansetron (ZOFRAN) injection 4 mg  4 mg IntraVENous Q4H PRN Teodora Estes MD        aspirin chewable tablet 81 mg  81 mg Oral DAILY Teodora Estes MD   81 mg at 01/28/20 0914    acetaminophen (TYLENOL) tablet 650 mg  650 mg Oral Q4H PRN Teodora Estes MD   650 mg at 01/28/20 2246    enoxaparin (LOVENOX) injection 40 mg  40 mg SubCUTAneous Q24H Teodora Estes MD   40 mg at 01/28/20 1458    dextrose 40% (GLUTOSE) oral gel 1 Tube  15 g Oral PRN Teodora Estes MD        glucagon [facility]) injection 1 mg  1 mg IntraMUSCular PRN Teodora Estes MD        dextrose (D50W) injection syrg 12.5-25 g  25-50 mL IntraVENous PRN Teodora Estes MD           Review of Systems negative with exception of pertinent positives noted above  PHYSICAL EXAM     Visit Vitals  /85 (BP 1 Location: Right arm, BP Patient Position: At rest)   Pulse 71   Temp 97.7 °F (36.5 °C)   Resp 18   Ht 5' 6\" (1.676 m)   Wt 95.3 kg (210 lb)   SpO2 97%   BMI 33.89 kg/m²      Temp (24hrs), Av.1 °F (36.7 °C), Min:97.7 °F (36.5 °C), Max:98.5 °F (36.9 °C)    Oxygen Therapy  O2 Sat (%): 97 % (20 0400)  Pulse via Oximetry: 56 beats per minute (20)  O2 Device: Room air (20)  No intake or output data in the 24 hours ending 20 0740   General: No acute distress    Lungs: CTA bilaterally. Resp even and unlabored  Heart:  S1S2 present without murmurs rubs gallops. RRR. No LE edema  Abdomen: Soft, non tender, non distended. BS present  Extremities: No cyanosis. Left UE/LE weakness 1/5  Neurologic:  A/O X3. Dysarthria noted. Left facial droop. Sensation intact. LUE/LLE strength 2/5. Results summary of Diagnostic Studies/Procedures copied from within [facility] EMR:        [address] Problems    Diagnosis Date Noted    PFO (patent foramen ovale) 2019    Arrhythmia 12/15/2019    Hyperlipemia     Acute embolic stroke ([facility] [address]     Type 2 diabetes mellitus (HCC)     Hypertension      Plan:    -Acute embolic stroke: POA  Dysphagia   MRI head: with acute infarcts in L cerebellar hemisphere, deep frontoparietal white matter, and R paramedian yariel.  Also noted old lacunar infarcts. ISMAEL: 3 mm PFO, needs to f/u outpatient with Cardiology for evaluation for closure   Cont statin, ASA  PT/OT/Speech     -Hypertension: continue home meds      -Type 2 diabetes mellitus:  Metformin     -Arrhythmia: had brief wide complex tachycardia early in admission, will need event monitor as outpt per cards    DVT Prophylaxis: lovenox    DISPO: Difficult placement, case management working on plan. Patient has no medical insurance. Pt can't go home as pt's wife is disabled and is not safe at home without supervision as pt needs long term care.       Carlos A Orellana MD

## 2020-01-29 NOTE — PROGRESS NOTES
01/29/20 7664   NIH Stroke Scale   Interval Other (comment)  (Dual [facility] with Nidhi Rios RN )   LOC 0   LOC Questions 0   LOC Commands 0   Motor Right Arm 0   Motor Left Arm 2   Motor Right Leg 0   Motor Left Leg 2   Dysarthria 1

## 2020-01-29 NOTE — PROGRESS NOTES
Problem: Mobility Impaired (Adult and Pediatric)  Goal: *Acute Goals and Plan of Care  Description  Acute PT Goals (reviewed & updated 1/29/2020):  STG:  (1.) Mr. Papito Ellison will perform bed mobility with MINIMAL ASSISTANCE within 3 treatment day(s). (2.) Mr. Papito Ellison will demonstrate good dynamic sitting balance within 3 treatment days. (3.) Mr. Papito Ellison will transfer sit to stand with MINIMAL ASSISTANCE X1 within 3 treatment days. (4.)Mr. Saulo Zambrano will transfer from bed to chair and chair to bed with MODERATE ASSIST x1 using the least restrictive device within 3 treatment day(s). LTG:  (1.) Mr. Papito Ellison will perform bed mobility with CONTACT GUARD ASSISTANCE within 7 treatment day(s). (2.) Mr. Papito Elilson will transfer from bed to chair and chair to bed with MINIMAL ASSIST using the least restrictive device within 7 treatment day(s). (3.) Mr. Papito Ellison will ambulate with MODERATE ASSIST for 5+ feet with the least restrictive device to assist with transfers within 7 treatment day(s). (4.) Mr. Papito Ellison will perform standing static and dynamic balance activities x 10 minutes with MINIMAL ASSIST to improve safety within 7 treatment day(s). (5.)Mr. Papito Ellison will demonstrate FAIR DYNAMIC STANDING balance within 7 treatment days. (6.) Mr. Papito Ellison will tolerate 25+ minutes of neuromuscular re-education and/or therapeutic activity/exercise while maintaining stable vitals within 7 treatment day(s).      PHYSICAL THERAPY: Daily Note, Re-evaluation and AM 1/29/2020  INPATIENT: PT Visit Days : 1  Payor: MEDICAID PENDING / Plan: William Garcia PENDING / Product Type: Medicaid /       NAME/AGE/GENDER: Gifty Johnson is a 53 y.o. male   PRIMARY DIAGNOSIS: Acute ischemic stroke ([address] [I63.9]  Cerebrovascular accident (CVA) due to embolism ([address] [S77.9] Acute embolic stroke ([address] Acute embolic stroke ([address]       ICD-10: Treatment Diagnosis:   · Other lack of cordination (R27.8)  · Difficulty in walking, Not elsewhere classified (R26.2)  · Other abnormalities of gait and mobility (R26.89)  · Unspecified Lack of Coordination (R27.9)  · Hemiplegia and hemiparesis following cerebral infarction affecting   · left non-dominant side (H78.622)   Precaution/Allergies:  Patient has no known allergies. ASSESSMENT:     Mr. Davey Stein is a 53year old admitted with acute R MCV CVA with left hemiparesis. Patient seen this AM for PT re-evaluation to address progression toward acute PT goals. Patient presents supine in bed, oriented x4, dysarthric, and motivated to participate. Attention to left visual/spatial field is improved. L UE remains flaccid and OT addressing taping an positioning for hemiparetic shoulder. Patient reports 0/10 pain. Trace 1/5 hip abduction and extension appreciated, and 2+/5 hip adduction on left. Addressed bed mobility with compensatory hooking on L LE with R and performed supine to sitting with moderate assistance x1. Addressed postural retraining, dynamic sitting balance with reaching outside of base of support, NDT D1/D2 and joint approximation L UE and L LE. Addressed sit to stand x4 with static standing and postural retraining with moderate assistance x1 at luke-walker (blocking of L LE and gait belt to assist with standing balance utilized). With moderate assistance x2 addressed lateral stepping to left with manual weight-shifts and L LE advancement with blocking of L LE. Patient did well with this training and assisted with abduction of L LEwith moderate assistance x2. Dynamic standing balance remains poor; yet trunk control and awareness of L LE is improved. Addressed hip and trunk extension in standing as well as eccentric control with stand to sit. Addressed bed mobility and bed placed into chair postioning. Goals and plan of care updated above to reflect current patient status. Slow, steady progress toward goals with most notable improvement in sitting balance and L LE + trunk control.  Remains with significant impairments from independent baseline. Recommend inpatient rehabilitation at discharge to optimize functioning; patient is current uninsured with challenging discharge plan. Will continue to follow during acute stay. This section established at most recent assessment   PROBLEM LIST (Impairments causing functional limitations):  1. Decreased Strength  2. Decreased ADL/Functional Activities  3. Decreased Transfer Abilities  4. Decreased Ambulation Ability/Technique  5. Decreased Balance  6. Increased Pain  7. Decreased Activity Tolerance  8. Increased Fatigue  9. Decreased Flexibility/Joint Mobility   INTERVENTIONS PLANNED: (Benefits and precautions of physical therapy have been discussed with the patient.)  1. Balance Exercise  2. Bed Mobility  3. Family Education  4. Gait Training  5. Home Exercise Program (HEP)  6. Manual Therapy  7. Neuromuscular Re-education/Strengthening  8. Range of Motion (ROM)  9. Therapeutic Activites  10. Therapeutic Exercise/Strengthening  11. Transfer Training     TREATMENT PLAN: Frequency/Duration: 3 times a week for duration of hospital stay  Rehabilitation Potential For Stated Goals: Good     REHAB RECOMMENDATIONS (at time of discharge pending progress):    Placement: It is my opinion, based on this patient's performance to date, that Mr. Kyung Cisse may benefit from intensive therapy at an [address] [place] [address] after discharge due to a probable need for 24 hour rehab nursing, a probable need for multiple therapy disciplines, and potential to make ongoing and sustainable functional improvement that is of practical value. .  Equipment:    TBD pending progress with therapy. HISTORY:   History of Present Injury/Illness (Reason for Referral):  Per H&P: \"Pt is a 52 y/o smoker with DM, HTN, who presented to ER with L leg and arm weakness, L facial droop, dysarthria. First noted L leg weakness late night 12/11 when he woke up to go to the bathroom.   He was normal when he went to bed around 1030. Woke up this morning and had persistent weakness L leg and also now noted in L arm. EMS called. Noted to have slurred speech and L facial droop as well. Code S called in ER around 9am.  MRI with acute infarcts in L cerebellar hemisphere, deep frontoparietal white matter, and R paramedian yraiel. Also noted old lacunar infarcts. No large vessel occlusion on CTA, but some stenosis noted. No hx afib, TIA, CVA. No CP, palpitations, SOB. \"  Past Medical History/Comorbidities:   Mr. Shanell Cueva  has a past medical history of Acute ischemic stroke ([address] (12/12/2019), Acute pancreatitis (11/19/2014), Cerebrovascular accident (CVA) due to embolism ([address] (12/12/2019), Diabetes ([address] (2002), Diabetes ([address] Diabetes mellitus, and Hypertension. He also has no past medical history of Arthritis, Asthma, Autoimmune disease ([address] CAD (coronary artery disease), Cancer ([address] Chronic kidney disease, COPD, Dementia, Dementia ([address] Heart failure ([address] Ill-defined condition, Infectious disease, Liver disease, Other ill-defined conditions(799.89), Psychiatric disorder, PUD (peptic ulcer disease), Seizures ([address] or Sleep disorder. Mr. Shanell Cueva  has a past surgical history that includes hx hernia repair and hx orthopaedic. Social History/Living Environment:   Home Environment: Apartment  # Steps to Enter: 12  Rails to Enter: Yes  [employer] : Bilateral  One/Two Story Residence: One story  Living Alone: No  Support Systems: Spouse/Significant Other/Partner  Patient Expects to be Discharged to[de-identified] Unknown  Current DME Used/Available at Home: None  Tub or Shower Type: Tub/Shower combination  Prior Level of Function/Work/Activity:  Independent, lives with wife in 2nd story 1 level apartment. No recent falls.    Number of Personal Factors/Comorbidities that affect the Plan of Care: 1-2: MODERATE COMPLEXITY   EXAMINATION:   Most Recent Physical Functioning:   Gross Assessment:  AROM: Grossly decreased, non-functional(L UE L LE)  Strength: Grossly decreased, non-functional(L UE L LE)  Coordination: Grossly decreased, non-functional(L UE L LE)  Tone: Abnormal(Flaccid L UE)  Sensation: Intact(Light touch and noxious L UE L LE)               Posture:     Balance:  Sitting: Intact  Sitting - Static: Good (unsupported)  Sitting - Dynamic: Fair (occasional)  Standing: Impaired  Standing - Static: Poor(Occasional fair - with faciliaiton to L LE)  Standing - Dynamic : Poor Bed Mobility:  Supine to Sit: Moderate assistance  Sit to Supine: Moderate assistance  Scooting: Moderate assistance  Wheelchair Mobility:     Transfers:  Sit to Stand: Moderate assistance  Stand to Sit: Moderate assistance  Gait:     Base of Support: Shift to right;Center of gravity altered  Stance: Right increased  Gait Abnormalities: Hemiplegic  Distance (ft): 6 Feet (ft)  Assistive Device: Walker luke  Ambulation - Level of Assistance: Moderate assistance;Assist x2  Interventions: Safety awareness training; Tactile cues; Verbal cues      Body Structures Involved:  1. Nerves  2. Voice/Speech  3. Bones  4. Joints  5. Muscles Body Functions Affected:  1. Mental  2. Sensory/Pain  3. Neuromusculoskeletal  4. Movement Related Activities and Participation Affected:  1. General Tasks and Demands  2. Mobility  3. Self Care  4. Interpersonal Interactions and Relationships   Number of elements that affect the Plan of Care: 4+: HIGH COMPLEXITY   CLINICAL PRESENTATION:   Presentation: Evolving clinical presentation with changing clinical characteristics: MODERATE COMPLEXITY   CLINICAL DECISION MAKIN [facility] Mobility Inpatient Short Form  How much difficulty does the patient currently have. .. Unable A Lot A Little None   1. Turning over in bed (including adjusting bedclothes, sheets and blankets)? [] 1   [] 2   [x] 3   [] 4   2.   Sitting down on and standing up from a chair with arms ( e.g., wheelchair, bedside commode, etc.)   [] 1   [x] 2   [] 3   [] 4   3. Moving from lying on back to sitting on the side of the bed? [] 1   [] 2   [x] 3   [] 4   How much help from another person does the patient currently need. .. Total A Lot A Little None   4. Moving to and from a bed to a chair (including a wheelchair)? [] 1   [x] 2   [] 3   [] 4   5. Need to walk in hospital room? [] 1   [x] 2   [] 3   [] 4   6. Climbing 3-5 steps with a railing? [] 1   [x] 2   [] 3   [] 4   © 2007, Trustees of [employer], under license to [employer]. All rights reserved      Score:  Initial: 13 Most Recent: 14 (Date:1/29/2020)    Interpretation of Tool:  Represents activities that are increasingly more difficult (i.e. Bed mobility, Transfers, Gait). Medical Necessity:     · Patient is expected to demonstrate progress in   · strength, range of motion, balance, coordination, and functional technique  ·  to   · increase independence with all mobility. · .  Reason for Services/Other Comments:  · Patient continues to require skilled intervention due to   · medical complications and mobility deficits which impact his level of function, safety, and independence as indicated above. · .   Use of outcome tool(s) and clinical judgement create a POC that gives a: Questionable prediction of patient's progress: MODERATE COMPLEXITY        TREATMENT:      Pre-treatment Symptoms/Complaints:  Fatigued  Pain: Initial: None Post Session:  None     Physical Therapy Re-evaluation (untimed charge)    Neuromuscular Re-education ( 38 Minutes):  Exercise/activities including static and dynamic sitting balance activity, bed mobility/transfer training/and stepping with faciliation, NDT D1/D2 and joint compression L UE/LE, postural retraining in sitting and standing with trunk facilitation, and positioning to improve balance, coordination, posture and functional mobility.   Required minimal to moderate visual, verbal, manual and tactile cues to promote static and dynamic balance in sitting and standing. Braces/Orthotics/Lines/Etc:   · O2 Device: Room Air   Treatment/Session Assessment:    · Response to Treatment:  See above  · Interdisciplinary Collaboration:   o Physical Therapist  o Registered Nurse  o Rehabilitation Attendant  o Certified Nursing Assistant/Patient Care Technician  · After treatment position/precautions:   o Supine in bed  o Bed alarm/tab alert on  o Bed/Chair-wheels locked  o Bed in low position  o Call light within reach  o RN notified  o Side rails x 3  o Bed in partial chair position with PCT notified and safety measures in place with bed alarm; patient comfortable and needs are within reach   · Compliance with Program/Exercises: Compliant all of the time  · Recommendations/Intent for next treatment session: \"Next visit will focus on advancements to more challenging activities and reduction in assistance provided\".     Total Treatment Duration:  PT Patient Time In/Time Out  Time In: 0950  Time Out: [facility], DPT

## 2020-01-29 NOTE — PROGRESS NOTES
Problem: Pressure Injury - Risk of  Goal: *Prevention of pressure injury  Description  Document Dewey Scale and appropriate interventions in the flowsheet. Outcome: Progressing Towards Goal  Note: Pressure Injury Interventions:  Sensory Interventions: Assess changes in LOC    Moisture Interventions: Absorbent underpads, Apply protective barrier, creams and emollients, Check for incontinence Q2 hours and as needed    Activity Interventions: Increase time out of bed, Pressure redistribution bed/mattress(bed type), PT/OT evaluation    Mobility Interventions: HOB 30 degrees or less, Pressure redistribution bed/mattress (bed type), PT/OT evaluation, Suspension boots, Turn and reposition approx. every two hours(pillow and wedges)    Nutrition Interventions: Document food/fluid/supplement intake    Friction and Shear Interventions: HOB 30 degrees or less                Problem: Patient Education: Go to Patient Education Activity  Goal: Patient/Family Education  Outcome: Progressing Towards Goal     Problem: Falls - Risk of  Goal: *Absence of Falls  Description  Document Lisbeth Cohn Fall Risk and appropriate interventions in the flowsheet.   Outcome: Progressing Towards Goal  Note: Fall Risk Interventions:  Mobility Interventions: Bed/chair exit alarm, OT consult for ADLs, Patient to call before getting OOB, PT Consult for mobility concerns    Mentation Interventions: Bed/chair exit alarm, Door open when patient unattended    Medication Interventions: Bed/chair exit alarm, Evaluate medications/consider consulting pharmacy, Patient to call before getting OOB, Teach patient to arise slowly    Elimination Interventions: Bed/chair exit alarm, Call light in reach, Patient to call for help with toileting needs, Stay With Me (per policy)    History of Falls Interventions: Bed/chair exit alarm, Door open when patient unattended, Investigate reason for fall         Problem: Patient Education: Go to Patient Education Activity  Goal: Patient/Family Education  Outcome: Progressing Towards Goal     Problem: Diabetes Self-Management  Goal: *Disease process and treatment process  Description  Define diabetes and identify own type of diabetes; list 3 options for treating diabetes. Outcome: Progressing Towards Goal  Goal: *Incorporating nutritional management into lifestyle  Description  Describe effect of type, amount and timing of food on blood glucose; list 3 methods for planning meals. Outcome: Progressing Towards Goal  Goal: *Incorporating physical activity into lifestyle  Description  State effect of exercise on blood glucose levels. Outcome: Progressing Towards Goal  Goal: *Developing strategies to promote health/change behavior  Description  Define the ABC's of diabetes; identify appropriate screenings, schedule and personal plan for screenings. Outcome: Progressing Towards Goal  Goal: *Using medications safely  Description  State effect of diabetes medications on diabetes; name diabetes medication taking, action and side effects. Outcome: Progressing Towards Goal  Goal: *Monitoring blood glucose, interpreting and using results  Description  Identify recommended blood glucose targets  and personal targets. Outcome: Progressing Towards Goal  Goal: *Prevention, detection, treatment of acute complications  Description  List symptoms of hyper- and hypoglycemia; describe how to treat low blood sugar and actions for lowering  high blood glucose level. Outcome: Progressing Towards Goal  Goal: *Prevention, detection and treatment of chronic complications  Description  Define the natural course of diabetes and describe the relationship of blood glucose levels to long term complications of diabetes.   Outcome: Progressing Towards Goal  Goal: *Developing strategies to address psychosocial issues  Description  Describe feelings about living with diabetes; identify support needed and support network  Outcome: Progressing Towards Goal     Problem: Patient Education: Go to Patient Education Activity  Goal: Patient/Family Education  Outcome: Progressing Towards Goal     Problem: General Medical Care Plan  Goal: *Vital signs within specified parameters  Outcome: Progressing Towards Goal  Goal: *Labs within defined limits  Outcome: Progressing Towards Goal  Goal: *Absence of infection signs and symptoms  Description  Wash hand more often   Outcome: Progressing Towards Goal  Goal: *Optimal pain control at patient's stated goal  Outcome: Progressing Towards Goal  Goal: *Skin integrity maintained  Outcome: Progressing Towards Goal  Goal: *Fluid volume balance  Outcome: Progressing Towards Goal  Goal: *Optimize nutritional status  Outcome: Progressing Towards Goal  Goal: *Anxiety reduced or absent  Outcome: Progressing Towards Goal  Goal: *Progressive mobility and function (eg: ADL's)  Outcome: Progressing Towards Goal     Problem: Patient Education: Go to Patient Education Activity  Goal: Patient/Family Education  Outcome: Progressing Towards Goal     Problem: Pain  Goal: *Control of Pain  Outcome: Progressing Towards Goal     Problem: Patient Education: Go to Patient Education Activity  Goal: Patient/Family Education  Outcome: Progressing Towards Goal

## 2020-01-29 NOTE — PROGRESS NOTES
Dysphagia(re evaluation 1/7/2020)  LTG: Patient will tolerate least restrictive diet without overt signs or symptoms of airway compromise. STG: Patient will tolerate mechanical soft diet(ground meats/chopped vegetables) and thin liquids by cup without overt signs or symptoms of airway compromise. STG: Patient will demonstrate use of compensatory strategies to improve swallow safety/function with 90% accuracy given minimal cueing  STG: Patient participate in exercises to improve oral motor movement with 80% accuracy given minimal cueing  STG: Patient will participate in modified barium swallow study as clinically indicated. Neurolinguistic Goals:  LTG: Patient will increase neuro-linguistic abilities to increase safety and awareness in functional living environment   STG: Patient will participate in speech/language/cognitive evaluation.  MET 12/17/19  STG: Patient will solve mathematical problems with 80%accuracy given minimal cueing   STG: Patient will name 10 items to concrete category in 1 minute given minimal cueing  STG: Patient will participate in verbal reasoning tasks with 80% accuracy given minimal cueing  STG: Patient will complete mental manipulation tasks with 80% accuracy given minimal cueing  STG: Patient will complete working memory/attention tasks with 80% accuracy given minimal cueing  STG: Patient will recall relevant verbal information with 80% accuracy with minimal assistance  STG: Patient will utilize memory compensatory strategies to improve recall of functional list/message with 90%accuracy given minimal assistance  STG: Patient will participate in ongoing cognitive linguistic evaluation for therapeutic assessment     SPEECH LANGUAGE PATHOLOGY: DYSPHAGIA and SPEECH/LANGUAGE- Daily Note 8    NAME/AGE/GENDER: Jenine Oppenheim is a 53 y.o. male  DATE: 1/29/2020  PRIMARY DIAGNOSIS: Acute ischemic stroke ([facility] [I63.9]  Cerebrovascular accident (CVA) due to embolism ([facility] [I63.9]      ICD-10: Treatment Diagnosis: R13.12 Dysphagia, Oropharyngeal Phase    INTERDISCIPLINARY COLLABORATION: Registered Nurse  PRECAUTIONS/ALLERGIES: Patient has no known allergies. SUBJECTIVE   Upright in bed. Orientation:   Person  Place  Time  Situation    Pain: Pain Scale 1: Visual  Pain Intensity 1: 0     OBJECTIVE   Swallowing:   Not addressed during session          Speech/cognitive linguistic treatment:   Pt completed word deductions in 10/10   Pt completed complex yes/no 5/5  Pt completed problem solving/reasoning in 5/5   Speech intelligibility: 80% intelligible in conversation. ASSESSMENT   Swallowing: Not addressed. Continue current diet: mechanical soft diet/thin liquids by cup only. No straws. Speech/Cognitive Function: Continues to present with moderate dysarthria and cognitive linguistic impairments. Short term and immediate memory moderately impaired reducing functional recall. Reduced reasoning skills during structured tasks also reduce patient's ability to complete higher level ADLS independently upon discharge. Recommend ongoing skilled intervention to improve swallow/function strength and cognitive linguistic abilities.           Tool Used: Dysphagia Outcome and Severity Scale (ROCHELLE)    Score Comments   Normal Diet  [] 7 With no strategies or extra time needed   Functional Swallow  [] 6 May have mild oral or pharyngeal delay   Mild Dysphagia  [] 5 Which may require one diet consistency restricted    Mild-Moderate Dysphagia  [] 4 With 1-2 diet consistencies restricted   Moderate Dysphagia  [] 3 With 2 or more diet consistencies restricted   Moderate-Severe Dysphagia  [] 2 With partial PO strategies (trials with ST only)   Severe Dysphagia  [] 1 With inability to tolerate any PO safely      Score:  Initial:4 Most Recent: 4 (Date 01/29/20 )   Interpretation of Tool: The Dysphagia Outcome and Severity Scale (ROCHELLE) is a simple, easy-to-use, 7-point scale developed to systematically rate the functional severity of dysphagia based on objective assessment and make recommendations for diet level, independence level, and type of nutrition. PLAN    FREQUENCY/DURATION: Continue to follow patient 3 times a week for duration of hospital stay to address above goals. - Recommendations for next treatment session: Next treatment will address dysarthria, dysphagia, cognition     REHABILITATION POTENTIAL FOR STATED GOALS: Good     COMPLIANCE WITH PROGRAM/EXERCISES: Will assess as treatment progresses    CONTINUATION OF SKILLED SERVICES/MEDICAL NECESSITY:   Patient is expected to demonstrate progress in  swallow strength, swallow timeliness, swallow function, diet tolerance and swallow safety in order to  improve swallow safety, work toward diet advancement and decrease aspiration risk.  Patient continues to require skilled intervention due to dysphagia. RECOMMENDATIONS   DIET:    PO:  Mechanical soft with ground meat/chopped vegatables   Liquids:  regular thin by cup only    MEDICATIONS: Crushed in puree     ASPIRATION PRECAUTIONS  · Slow rate of intake  · Small bites/sips  · Upright at 90 degrees during meal     COMPENSATORY STRATEGIES/MODIFICATIONS  · Alternate liquids/solids  · Small sips and bites  · Slow rate  · NO STRAWS   EDUCATION:  · Patient     RECOMMENDATIONS for CONTINUED SPEECH THERAPY: YES: Anticipate need for ongoing speech therapy during this hospitalization and at next level of care.          SAFETY:  After treatment position/precautions:  · Upright in bed  · Call light within reach    Total Treatment Duration:   Time In: 0915  Time Out: Hitesh ADAME/FROYLAN/SLP

## 2020-01-29 NOTE — PROGRESS NOTES
Problem: Pressure Injury - Risk of  Goal: *Prevention of pressure injury  Description  Document Dewey Scale and appropriate interventions in the flowsheet. Outcome: Progressing Towards Goal  Note: Pressure Injury Interventions:  Sensory Interventions: Assess changes in LOC    Moisture Interventions: Absorbent underpads, Limit adult briefs    Activity Interventions: Increase time out of bed, Pressure redistribution bed/mattress(bed type), PT/OT evaluation    Mobility Interventions: HOB 30 degrees or less, Pressure redistribution bed/mattress (bed type), PT/OT evaluation    Nutrition Interventions: Document food/fluid/supplement intake    Friction and Shear Interventions: HOB 30 degrees or less                Problem: Patient Education: Go to Patient Education Activity  Goal: Patient/Family Education  Outcome: Progressing Towards Goal     Problem: Falls - Risk of  Goal: *Absence of Falls  Description  Document Jeanne Fall Risk and appropriate interventions in the flowsheet.   Outcome: Progressing Towards Goal  Note: Fall Risk Interventions:  Mobility Interventions: Bed/chair exit alarm, Communicate number of staff needed for ambulation/transfer, Patient to call before getting OOB    Mentation Interventions: Bed/chair exit alarm, Door open when patient unattended    Medication Interventions: Bed/chair exit alarm, Patient to call before getting OOB    Elimination Interventions: Bed/chair exit alarm, Call light in reach, Patient to call for help with toileting needs    History of Falls Interventions: Bed/chair exit alarm, Door open when patient unattended         Problem: Patient Education: Go to Patient Education Activity  Goal: Patient/Family Education  Outcome: Progressing Towards Goal     Problem: Patient Education: Go to Patient Education Activity  Goal: Patient/Family Education  Outcome: Progressing Towards Goal     Problem: Pain  Goal: *Control of Pain  Outcome: Progressing Towards Goal

## 2020-01-29 NOTE — PROGRESS NOTES
01/28/20 1922   NIH Stroke Scale   Interval Other (comment)  (dual nih)   LOC 0   LOC Questions 0   LOC Commands 0   Best Gaze 0   Visual 0   Facial Palsy 1   Motor Right Arm 0   Motor Left Arm 2   Motor Right Leg 0   Motor Left Leg 2   Limb Ataxia 0   Sensory 0   Best Language 1   Dysarthria 1   Extinction and Inattention 0   Total 7   Dual NIH with Liset Nguyen RN

## 2020-01-30 PROCEDURE — 97112 NEUROMUSCULAR REEDUCATION: CPT

## 2020-01-30 PROCEDURE — 74011250637 HC RX REV CODE- 250/637: Performed by: INTERNAL MEDICINE

## 2020-01-30 PROCEDURE — 65270000029 HC RM PRIVATE

## 2020-01-30 PROCEDURE — 97535 SELF CARE MNGMENT TRAINING: CPT

## 2020-01-30 PROCEDURE — 97530 THERAPEUTIC ACTIVITIES: CPT

## 2020-01-30 PROCEDURE — 74011250637 HC RX REV CODE- 250/637: Performed by: HOSPITALIST

## 2020-01-30 PROCEDURE — 74011250636 HC RX REV CODE- 250/636: Performed by: INTERNAL MEDICINE

## 2020-01-30 RX ADMIN — GUAIFENESIN 100 MG: 100 SOLUTION ORAL at 18:15

## 2020-01-30 RX ADMIN — ENOXAPARIN SODIUM 40 MG: 40 INJECTION SUBCUTANEOUS at 14:30

## 2020-01-30 RX ADMIN — ACETAMINOPHEN 650 MG: 325 TABLET, FILM COATED ORAL at 18:16

## 2020-01-30 RX ADMIN — Medication 10 ML: at 22:43

## 2020-01-30 RX ADMIN — ASPIRIN 81 MG 81 MG: 81 TABLET ORAL at 08:09

## 2020-01-30 RX ADMIN — LISINOPRIL 40 MG: 20 TABLET ORAL at 08:09

## 2020-01-30 RX ADMIN — METFORMIN HYDROCHLORIDE 500 MG: 500 TABLET, FILM COATED ORAL at 08:09

## 2020-01-30 RX ADMIN — METFORMIN HYDROCHLORIDE 500 MG: 500 TABLET, FILM COATED ORAL at 16:51

## 2020-01-30 RX ADMIN — ATORVASTATIN CALCIUM 80 MG: 80 TABLET, FILM COATED ORAL at 22:43

## 2020-01-30 RX ADMIN — METOPROLOL SUCCINATE 25 MG: 25 TABLET, FILM COATED, EXTENDED RELEASE ORAL at 08:09

## 2020-01-30 NOTE — PROGRESS NOTES
01/30/20 0701   NIH Stroke Scale   Interval Other (comment)   LOC 0   LOC Questions 0   LOC Commands 0   Best Gaze 0   Visual 0   Facial Palsy 1   Motor Right Arm 0   Motor Left Arm 2   Motor Right Leg 0   Motor Left Leg 2   Limb Ataxia 0   Sensory 0   Best Language 1   Dysarthria 1   Extinction and Inattention 0   Total 7     Dual NIH with Mehreen Quinn RN.

## 2020-01-30 NOTE — PROGRESS NOTES
Problem: Mobility Impaired (Adult and Pediatric)  Goal: *Acute Goals and Plan of Care  Description  Acute PT Goals (reviewed & updated 1/29/2020):  STG:  (1.) Mr. Krys Grimaldo will perform bed mobility with MINIMAL ASSISTANCE within 3 treatment day(s). (2.) Mr. Krys Grimaldo will demonstrate good dynamic sitting balance within 3 treatment days. (3.) Mr. Krys Grimaldo will transfer sit to stand with MINIMAL ASSISTANCE X1 within 3 treatment days. (4.)Mr. Carson Chowdhury will transfer from bed to chair and chair to bed with MODERATE ASSIST x1 using the least restrictive device within 3 treatment day(s). LTG:  (1.) Mr. Krys Grimaldo will perform bed mobility with CONTACT GUARD ASSISTANCE within 7 treatment day(s). (2.) Mr. Krys Grimaldo will transfer from bed to chair and chair to bed with MINIMAL ASSIST using the least restrictive device within 7 treatment day(s). (3.) Mr. Krys Grimaldo will ambulate with MODERATE ASSIST for 5+ feet with the least restrictive device to assist with transfers within 7 treatment day(s). (4.) Mr. Krys Grimaldo will perform standing static and dynamic balance activities x 10 minutes with MINIMAL ASSIST to improve safety within 7 treatment day(s). (5.)Mr. Krys Grimaldo will demonstrate FAIR DYNAMIC STANDING balance within 7 treatment days. (6.) Mr. Krys Grimaldo will tolerate 25+ minutes of neuromuscular re-education and/or therapeutic activity/exercise while maintaining stable vitals within 7 treatment day(s).      PHYSICAL THERAPY: Daily Note and PM 1/30/2020  INPATIENT: PT Visit Days : 2  Payor: MEDICAID PENDING / Plan: Shanita Summers PENDING / Product Type: Medicaid /       NAME/AGE/GENDER: Austin Lo is a [address] y.o. male   PRIMARY DIAGNOSIS: Acute ischemic stroke ([facility] [I63.9]  Cerebrovascular accident (CVA) due to embolism ([address] [X27.3] Acute embolic stroke ([address] Acute embolic stroke ([facility]       ICD-10: Treatment Diagnosis:   · Other lack of cordination (R27.8)  · Difficulty in walking, Not elsewhere classified (R26.2)  · Other abnormalities of gait and mobility (R26.89)  · Unspecified Lack of Coordination (R27.9)  · Hemiplegia and hemiparesis following cerebral infarction affecting   · left non-dominant side (G02.112)   Precaution/Allergies:  Patient has no known allergies. ASSESSMENT:     Mr. Jackie Anderson is a 53year old admitted with acute R MCV CVA with left hemiparesis. Patient seen this PM for PT treatment session. Addressed sitting balance, reaching outside of base of support, postural re-training, B LE weight bearing, sit to stand, weightshifts with NDT facilitation to L LE, and positioning. Patient with excellent participation today. Dynamic sitting balance and midline orientation continues to improve. Improved trunk and abdominal activation. Transfers are moderate assistance x2; patient with improve ab/adduction of L LE. Remains with impairments from independent baseline. Recommend inpatient rehabilitation at discharge to optimize functioning; patient is current uninsured with challenging discharge plan. Will continue to follow during acute stay. This section established at most recent assessment   PROBLEM LIST (Impairments causing functional limitations):  1. Decreased Strength  2. Decreased ADL/Functional Activities  3. Decreased Transfer Abilities  4. Decreased Ambulation Ability/Technique  5. Decreased Balance  6. Increased Pain  7. Decreased Activity Tolerance  8. Increased Fatigue  9. Decreased Flexibility/Joint Mobility   INTERVENTIONS PLANNED: (Benefits and precautions of physical therapy have been discussed with the patient.)  1. Balance Exercise  2. Bed Mobility  3. Family Education  4. Gait Training  5. Home Exercise Program (HEP)  6. Manual Therapy  7. Neuromuscular Re-education/Strengthening  8. Range of Motion (ROM)  9. Therapeutic Activites  10. Therapeutic Exercise/Strengthening  11.  Transfer Training     TREATMENT PLAN: Frequency/Duration: 3 times a week for duration of hospital stay  Rehabilitation Potential For Stated Goals: Good     REHAB RECOMMENDATIONS (at time of discharge pending progress):    Placement: It is my opinion, based on this patient's performance to date, that Mr. Romina Orlando may benefit from intensive therapy at an [address] [place] [address] after discharge due to a probable need for 24 hour rehab nursing, a probable need for multiple therapy disciplines, and potential to make ongoing and sustainable functional improvement that is of practical value. .  Equipment:    TBD pending progress with therapy. HISTORY:   History of Present Injury/Illness (Reason for Referral):  Per H&P: \"Pt is a 54 y/o smoker with DM, HTN, who presented to ER with L leg and arm weakness, L facial droop, dysarthria. First noted L leg weakness late night 12/11 when he woke up to go to the bathroom. He was normal when he went to bed around 1030. Woke up this morning and had persistent weakness L leg and also now noted in L arm. EMS called. Noted to have slurred speech and L facial droop as well. Code S called in ER around 9am.  MRI with acute infarcts in L cerebellar hemisphere, deep frontoparietal white matter, and R paramedian yariel. Also noted old lacunar infarcts. No large vessel occlusion on CTA, but some stenosis noted. No hx afib, TIA, CVA. No CP, palpitations, SOB. \"  Past Medical History/Comorbidities:   Mr. Romina Orlando  has a past medical history of Acute ischemic stroke ([address] (12/12/2019), Acute pancreatitis (11/19/2014), Cerebrovascular accident (CVA) due to embolism ([address] (12/12/2019), Diabetes ([address] (2002), Diabetes ([address] Diabetes mellitus, and Hypertension.  He also has no past medical history of Arthritis, Asthma, Autoimmune disease ([address] CAD (coronary artery disease), Cancer ([address] Chronic kidney disease, COPD, Dementia, Dementia ([address] Heart failure ([address] Ill-defined condition, Infectious disease, Liver disease, Other ill-defined conditions(799.89), Psychiatric disorder, PUD (peptic ulcer disease), Seizures ([facility] or Sleep disorder. Mr. Elvin Farooq  has a past surgical history that includes hx hernia repair and hx orthopaedic. Social History/Living Environment:   Home Environment: Apartment  # Steps to Enter: 12  Rails to Enter: Yes  [employer] : Bilateral  One/Two Story Residence: One story  Living Alone: No  Support Systems: Spouse/Significant Other/Partner  Patient Expects to be Discharged to[de-identified] Unknown  Current DME Used/Available at Home: None  Tub or Shower Type: Tub/Shower combination  Prior Level of Function/Work/Activity:  Independent, lives with wife in 2nd story 1 level apartment. No recent falls. Number of Personal Factors/Comorbidities that affect the Plan of Care: 1-2: MODERATE COMPLEXITY   EXAMINATION:   Most Recent Physical Functioning:   Gross Assessment:  AROM: Grossly decreased, non-functional(L UE L LE)  Strength: Grossly decreased, non-functional(L UE L LE)  Coordination: Grossly decreased, non-functional(L UE L LE)  Tone: Abnormal(Flaccid L UE)  Sensation: Intact(Light touch and noxious L UE L LE)               Posture:     Balance:  Sitting: Intact  Sitting - Static: Good (unsupported)  Sitting - Dynamic: Fair (occasional)  Standing: Impaired  Standing - Static: Poor  Standing - Dynamic : Poor Bed Mobility:  Supine to Sit: Moderate assistance  Sit to Supine: Moderate assistance  Scooting: Moderate assistance  Wheelchair Mobility:     Transfers:  Sit to Stand: Moderate assistance  Stand to Sit: Moderate assistance  Interventions: Visual cues; Verbal cues; Tactile cues; Weight shifting training/Pressure relief  Gait:            Body Structures Involved:  1. Nerves  2. Voice/Speech  3. Bones  4. Joints  5. Muscles Body Functions Affected:  1. Mental  2. Sensory/Pain  3. Neuromusculoskeletal  4. Movement Related Activities and Participation Affected:  1. General Tasks and Demands  2. Mobility  3. Self Care  4.  Interpersonal Interactions and Relationships Number of elements that affect the Plan of Care: 4+: HIGH COMPLEXITY   CLINICAL PRESENTATION:   Presentation: Evolving clinical presentation with changing clinical characteristics: MODERATE COMPLEXITY   CLINICAL DECISION MAKIN [facility] Inpatient Short Form  How much difficulty does the patient currently have. .. Unable A Lot A Little None   1. Turning over in bed (including adjusting bedclothes, sheets and blankets)? [] 1   [] 2   [x] 3   [] 4   2. Sitting down on and standing up from a chair with arms ( e.g., wheelchair, bedside commode, etc.)   [] 1   [x] 2   [] 3   [] 4   3. Moving from lying on back to sitting on the side of the bed? [] 1   [] 2   [x] 3   [] 4   How much help from another person does the patient currently need. .. Total A Lot A Little None   4. Moving to and from a bed to a chair (including a wheelchair)? [] 1   [x] 2   [] 3   [] 4   5. Need to walk in hospital room? [] 1   [x] 2   [] 3   [] 4   6. Climbing 3-5 steps with a railing? [] 1   [x] 2   [] 3   [] 4   © , Trustees of [address] [place] [address], under license to [employer]. All rights reserved      Score:  Initial: 13 Most Recent: 14 (Date:2020)    Interpretation of Tool:  Represents activities that are increasingly more difficult (i.e. Bed mobility, Transfers, Gait). Medical Necessity:     · Patient is expected to demonstrate progress in   · strength, range of motion, balance, coordination, and functional technique  ·  to   · increase independence with all mobility. · .  Reason for Services/Other Comments:  · Patient continues to require skilled intervention due to   · medical complications and mobility deficits which impact his level of function, safety, and independence as indicated above.    · .   Use of outcome tool(s) and clinical judgement create a POC that gives a: Questionable prediction of patient's progress: MODERATE COMPLEXITY        TREATMENT: Pre-treatment Symptoms/Complaints:  Fatigued  Pain: Initial: None Post Session:  None     Neuromuscular Re-education ( 60 Minutes):  Exercise/activities including static and dynamic sitting balance activity, reaching outside of base of support, sit to stand with facilitation to L LE, weight shifts in standing with L LE NDT faciliation, postural retraining in sitting and standing with trunk facilitation, and positioning to improve balance, coordination, posture and functional mobility. Required minimal to moderate visual, verbal, manual and tactile cues to promote static and dynamic balance in sitting and standing. At this time, patient is appropriate for Co-treatment with occupational therapy due to patient's decreased overall endurance/tolerance levels, as well as need for high level skilled assistance to complete functional transfers/mobility and functional tasks. Ray Nevarez is appropriate for a multidisciplinary co-treatment of PT and OT to address goals of both disciplines. Braces/Orthotics/Lines/Etc:   · O2 Device: Room Air   Treatment/Session Assessment:    · Response to Treatment:  See above  · Interdisciplinary Collaboration:   o Physical Therapist  o Occupational Therapist  o Registered Nurse  o Rehabilitation Attendant  · After treatment position/precautions:   o Supine in bed  o Bed alarm/tab alert on  o Bed/Chair-wheels locked  o Bed in low position  o Call light within reach  o RN notified  o Side rails x 3  o Bed in partial chair position with PCT notified and safety measures in place with bed alarm; patient comfortable and needs are within reach   · Compliance with Program/Exercises: Compliant all of the time  · Recommendations/Intent for next treatment session: \"Next visit will focus on advancements to more challenging activities and reduction in assistance provided\".     Total Treatment Duration:  PT Patient Time In/Time Out  Time In: 1335  Time Out: 1666 .[place] [id number], [place], DPT

## 2020-01-30 NOTE — PROGRESS NOTES
Progress Note    2020  Admit Date: 2019  9:07 AM   NAME: Jenine Oppenheim   :  1975   MRN:  [id number]   Attending: Tejal Da Silva MD  PCP:  Stephanie Norris MD  Treatment Team: Attending Provider: Tejal Da Silva MD; Care Manager: Lisa Jimenez, RN; Care Manager: Valiant Essex, [facility]; Consulting Provider: Gregorio Macias MD; Utilization Review: Miroslava Wood RN; Nurse Practitioner: Joey Calle NP; Charge Nurse: Angle Moctezuma RN; Charge Nurse: Patito Walters    Full Code     This is a 52 yo male with PMH of DM II, HTN who presented 19 with c/o left leg and arm weakness, left facial droop and dysarthria. Code S called. MRI with acute infarctions in left cerebellar hemisphere, deep frontoparietal white matter and right paramedian yariel. Also noted to have old lacunar infarcts. CTA without large vessel occlusions, however some stenosis noted. Echo showed PFO, Cardiology to f/u outpatient for evaluation for closure. Neurology consulted. Started on ASA, statin. Pt is uninsured, difficult placement, case management working on plan. SUBJECTIVE:     Patient reports feeling okay. No overnight events. No fever, diarrhea, nausea/vomiting, chills, chest pain, palpitations. ROS: 10 point ROS negative except what mentioned above. Past Medical History:   Diagnosis Date    [email or web address] 2019    Acute pancreatitis 2014    Cerebrovascular accident (CVA) due to embolism ([email or web address] 2019    Diabetes ([facility] 2002    Diabetes ([email or web address]     Diabetes mellitus     Hypertension      No results found for this or any previous visit (from the past 25 hour(s)).   No Known Allergies  Current Facility-Administered Medications   Medication Dose Route Frequency Provider Last Rate Last Dose    metFORMIN (GLUCOPHAGE) tablet 500 mg  500 mg Oral BID WITH MEALS Berto Borja MD   500 mg at 20 1638    metoprolol succinate (TOPROL-XL) XL tablet 25 mg  25 mg Oral DAILY Prashant Alexandre MD   25 mg at 20 0836    polyethylene glycol (MIRALAX) packet 17 g  17 g Oral DAILY PRN Trinda Colonel C, DO   17 g at 20 7975    guaiFENesin (ROBITUSSIN) 100 mg/5 mL oral liquid 100 mg  100 mg Oral Q4H PRN Prashant Alexandre MD   100 mg at 20 1643    hydrALAZINE (APRESOLINE) 20 mg/mL injection 20 mg  20 mg IntraVENous Q4H PRN Laura Jose MD   20 mg at 19 0133    atorvastatin (LIPITOR) tablet 80 mg  80 mg Oral QHS Laura Jose MD   80 mg at 20 211    lisinopril (PRINIVIL, ZESTRIL) tablet 40 mg  40 mg Oral DAILY Laura Jose MD   40 mg at 20 7347    sodium chloride (NS) flush 5-40 mL  5-40 mL IntraVENous PRN Laura Jose MD   10 mL at 20 2111    ondansetron [facility]) injection 4 mg  4 mg IntraVENous Q4H PRN Laura Jose MD        aspirin chewable tablet 81 mg  81 mg Oral DAILY Laura Jose MD   81 mg at 20 0034    acetaminophen (TYLENOL) tablet 650 mg  650 mg Oral Q4H PRN Laura Jose MD   650 mg at 20 1643    enoxaparin (LOVENOX) injection 40 mg  40 mg SubCUTAneous Q24H Laura Jose MD   40 mg at 20 1351    dextrose 40% (GLUTOSE) oral gel 1 Tube  15 g Oral PRN Laura Jose MD        glucagon [facility]) injection 1 mg  1 mg IntraMUSCular PRN Laura Jose MD        dextrose (D50W) injection syrg 12.5-25 g  25-50 mL IntraVENous PRN Laura Jose MD           Review of Systems negative with exception of pertinent positives noted above  PHYSICAL EXAM     Visit Vitals  /81 (BP 1 Location: Right arm, BP Patient Position: At rest)   Pulse 73   Temp 98 °F (36.7 °C)   Resp 16   Ht 5' 6\" (1.676 m)   Wt 95.3 kg (210 lb)   SpO2 97%   BMI 33.89 kg/m²      Temp (24hrs), Av °F (36.7 °C), Min:97.8 °F (36.6 °C), Max:98.2 °F (36.8 °C)    Oxygen Therapy  O2 Sat (%): 97 % (20 0400)  Pulse via Oximetry: 56 beats per minute (20 1918)  O2 Device: Room air (01/05/20 1930)  No intake or output data in the 24 hours ending 01/30/20 8005   General: No acute distress    Lungs: CTA bilaterally. Resp even and unlabored  Heart:  S1S2 present without murmurs rubs gallops. RRR. No LE edema  Abdomen: Soft, non tender, non distended. BS present  Extremities: No cyanosis. Left UE/LE weakness 1/5  Neurologic:  A/O X3. Dysarthria noted. Left facial droop. Sensation intact. LUE/LLE strength 2/5. Results summary of Diagnostic Studies/Procedures copied from within [facility] EMR:        [address] Problems    Diagnosis Date Noted    PFO (patent foramen ovale) 12/17/2019    Arrhythmia 12/15/2019    Hyperlipemia 16/45/4469    Acute embolic stroke ([facility] 43/92/8746    Type 2 diabetes mellitus (HCC)     Hypertension      Plan:    -Acute embolic stroke: POA  Dysphagia   MRI head: with acute infarcts in L cerebellar hemisphere, deep frontoparietal white matter, and R paramedian yariel.  Also noted old lacunar infarcts. ISMAEL: 3 mm PFO, needs to f/u outpatient with Cardiology for evaluation for closure   Cont statin, ASA  PT/OT/Speech     -Hypertension: continue home meds      -Type 2 diabetes mellitus:  Metformin     -Arrhythmia: had brief wide complex tachycardia early in admission, will need event monitor as outpt per cards    DVT Prophylaxis: lovenox    DISPO: Difficult placement, case management working on plan. Patient has no medical insurance. Pt can't go home as pt's wife is disabled and is not safe at home without supervision as pt needs long term care.       Karma Brown MD

## 2020-01-30 NOTE — PROGRESS NOTES
01/29/20 1911   NIH Stroke Scale   Interval Other (comment)  (Dual NIH with Pily Angeles RN)   LOC 0   LOC Questions 0   LOC Commands 0   Best Gaze 0   Visual 0   Facial Palsy 1   Motor Right Arm 0   Motor Left Arm 2   Motor Right Leg 0   Motor Left Leg 2   Limb Ataxia 0   Sensory 0   Best Language 1   Dysarthria 1   Extinction and Inattention 0   Total 7

## 2020-01-30 NOTE — PROGRESS NOTES
01/30/20 8160   NIH Stroke Scale   Interval Other (comment)  (Dual NIH with Jhon Daniels, RN)   LOC 0   LOC Questions 0   LOC Commands 0   Best Gaze 0   Visual 0   Facial Palsy 1   Motor Right Arm 0   Motor Left Arm 2   Motor Right Leg 0   Motor Left Leg 2   Limb Ataxia 0   Sensory 0   Best Language 1   Dysarthria 1   Extinction and Inattention 0   Total 7

## 2020-01-30 NOTE — PROGRESS NOTES
Problem: Pressure Injury - Risk of  Goal: *Prevention of pressure injury  Description  Document Dewey Scale and appropriate interventions in the flowsheet. Outcome: Progressing Towards Goal  Note: Pressure Injury Interventions:  Sensory Interventions: Assess changes in LOC    Moisture Interventions: Absorbent underpads    Activity Interventions: Pressure redistribution bed/mattress(bed type), Increase time out of bed, PT/OT evaluation    Mobility Interventions: HOB 30 degrees or less, Pressure redistribution bed/mattress (bed type), PT/OT evaluation    Nutrition Interventions: Document food/fluid/supplement intake    Friction and Shear Interventions: HOB 30 degrees or less                Problem: Patient Education: Go to Patient Education Activity  Goal: Patient/Family Education  Outcome: Progressing Towards Goal     Problem: Falls - Risk of  Goal: *Absence of Falls  Description  Document Jeanne Fall Risk and appropriate interventions in the flowsheet.   Outcome: Progressing Towards Goal  Note: Fall Risk Interventions:  Mobility Interventions: Bed/chair exit alarm, Communicate number of staff needed for ambulation/transfer    Mentation Interventions: Bed/chair exit alarm    Medication Interventions: Bed/chair exit alarm, Patient to call before getting OOB    Elimination Interventions: Bed/chair exit alarm, Call light in reach, Patient to call for help with toileting needs    History of Falls Interventions: Bed/chair exit alarm, Door open when patient unattended         Problem: Patient Education: Go to Patient Education Activity  Goal: Patient/Family Education  Outcome: Progressing Towards Goal     Problem: Pain  Goal: *Control of Pain  Outcome: Progressing Towards Goal     Problem: Patient Education: Go to Patient Education Activity  Goal: Patient/Family Education  Outcome: Progressing Towards Goal

## 2020-01-30 NOTE — PROGRESS NOTES
Problem: Self Care Deficits Care Plan (Adult)  Goal: *Acute Goals and Plan of Care (Insert Text)  Description  GOALS UPDATED 1/22/2020:   1. Patient will complete dynamic sitting balance for ADLs with supervision. 2. Patient will demonstrate appropriate safety awareness and protection of L UE during bed mobility and functional transfers with minimal cues. 3. Patient will complete total body bathing and dressing with moderate assistance and adaptive equipment as needed. 4. Patient will complete weightbearing into the L UE with ADL tasks with minimal assistance to improve ability to use as a functional assist during ADL tasks. 5. Patient will demonstrate modified independence with therapeutic exercises to improve strength in L UE for ADLs/functional transfers. 6. Patient will complete bed mobility with stand by assistance and adaptive equipment as needed in preparation for functional transfers. 7. Patient will complete functional transfers with contact guard assistance and adaptive equipment as needed. 8. Patient will complete functional mobility for ADLs with moderate assistance and adaptive equipment as needed.      Timeframe: 7 visits       Outcome: Progressing Towards Goal     OCCUPATIONAL THERAPY: Daily Note and PM    1/30/2020  INPATIENT: OT Visit Days: 5  Payor: MEDICAID PENDING / Plan: Cheri Mckeon PENDING / Product Type: Medicaid /      NAME/AGE/GENDER: Allie Winters is a 53 y.o. male   PRIMARY DIAGNOSIS:  Acute ischemic stroke ([facility] [address] [I63.9]  Cerebrovascular accident (CVA) due to embolism ([address] [E59.5] Acute embolic stroke ([address] Acute embolic stroke ([facility] [address]       ICD-10: Treatment Diagnosis:    · Generalized Muscle Weakness (M62.81)  · Other lack of cordination (R27.8)  · Hemiplegia and hemiparesis following cerebral infarction affecting   · left non-dominant side (I69.354)  · Abnormal posture (R29.3)   Precautions/Allergies:    NO PULLING ON LUE   LUE in sling with shoulder joint approximated and supported   Patient has no known allergies. ASSESSMENT:     Mr. Yvette Lindsey is a 53 y.o. male presents to the hospital with L sided weakness and acute ischemic CVA. MRI revealed acute to subacute L cerebellar and R paramedian yariel infarcts. At baseline pt lives with his wife and is independent. One finger wide subluxation noted in L shoulder (1/22/2020). 1/30/2020  Patient supine in bed, ready for therapy. Placed I strip of Kinesiotape wrap around to L shoulder for support. Patient stood with moderate assistance x 2 trials; he has a tendency to hyperextend L knee ( L UE supported in sling). PT provided input to L knee to facilitate muscles and prevent L knee hyperextension & prevent buckling L knee, as OT worked on trunk control and L UE weight bearing with weight shift. Patient stood with moderate assist x 2 for sit to stand, and using hemicane for balance, patient required assist for all parts of toileting. While sitting, input to L scapula to facilitate, and worked on L UE weight bearing with input to lateral hip hikers with reaching for items with L UETone noted in L biceps when OT asked patient to bend his L elbow. Patient took a couple of steps to left with moderate assistance x 2 using hemicane. Patient sat with moderate assistance x 2, and he returned to supine with moderate assistance. Reiterated KT precautions to patient, staff, & family. Patient highly motivated, and he is a great rehab candidate. Slow, steady progress noted this date. Cont OT per tx plan. This section established at most recent assessment   PROBLEM LIST (Impairments causing functional limitations):  1. Decreased Strength  2. Decreased ADL/Functional Activities  3. Decreased Transfer Abilities  4. Decreased Ambulation Ability/Technique  5. Decreased Balance  6. Decreased Activity Tolerance  7. Increased Fatigue  8. Decreased Flexibility/Joint Mobility  9. Decreased Knowledge of Precautions  10.  Decreased Calvin with Home Exercise Program   INTERVENTIONS PLANNED: (Benefits and precautions of occupational therapy have been discussed with the patient.)  1. Activities of daily living training  2. Adaptive equipment training  3. Balance training  4. Clothing management  5. Cognitive training  6. Donning&doffing training  7. Keanu tech training  8. Neuromuscular re-eduation  9. Therapeutic activity  10. Therapeutic exercise     TREATMENT PLAN: Frequency/Duration: Follow patient 3 times per week to address above goals. Rehabilitation Potential For Stated Goals: Excellent     REHAB RECOMMENDATIONS (at time of discharge pending progress):    Placement: It is my opinion, based on this patient's performance to date, that Mr. Leonid Baumann may benefit from intensive therapy at an [address] [place] [address] after discharge due to potential to make ongoing and sustainable functional improvement that is of practical value. Jarek Delacruz Pt functioning far below independent baseline, demonstrating good improvement and participation. Pt would likely benefit greatly and increase independence from inpatient rehab stay. Equipment:    TBD               OCCUPATIONAL PROFILE AND HISTORY:   History of Present Injury/Illness (Reason for Referral):  See H&P  Past Medical History/Comorbidities:   Mr. Leonid Baumann  has a past medical history of Acute ischemic stroke ([address] (12/12/2019), Acute pancreatitis (11/19/2014), Cerebrovascular accident (CVA) due to embolism ([address] (12/12/2019), Diabetes ([address] (2002), Diabetes ([address] Diabetes mellitus, and Hypertension. He also has no past medical history of Arthritis, Asthma, Autoimmune disease ([address] CAD (coronary artery disease), Cancer ([address] Chronic kidney disease, COPD, Dementia, Dementia ([address] Heart failure ([address] Ill-defined condition, Infectious disease, Liver disease, Other ill-defined conditions(799.89), Psychiatric disorder, PUD (peptic ulcer disease), Seizures ([address] or Sleep disorder.   Mr. Leonid Baumann  has a past surgical history that includes hx hernia repair and hx orthopaedic. Social History/Living Environment:   Home Environment: Apartment  # Steps to Enter: 12  Rails to Enter: Yes  [employer] : Bilateral  One/Two Story Residence: One story  Living Alone: No  Support Systems: Spouse/Significant Other/Partner  Patient Expects to be Discharged to[de-identified] Unknown  Current DME Used/Available at Home: None  Tub or Shower Type: Tub/Shower combination  Prior Level of Function/Work/Activity:  Pt lives at home with his wife. Pt is typically independent with ADL/functional mobility. Pt does not drive. Pt was working part-time at [employer]. Personal Factors:          Age:  53 y.o. Past/Current Experience:  CVA with flaccid L side        Other factors that influence how disability is experienced by the patient:  multiple co-morbidities    Number of Personal Factors/Comorbidities that affect the Plan of Care: Extensive review of physical, cognitive, and psychosocial performance (3+):  HIGH COMPLEXITY   ASSESSMENT OF OCCUPATIONAL PERFORMANCE[de-identified]   Activities of Daily Living:   Basic ADLs (From Assessment) Complex ADLs (From Assessment)   Feeding: Setup  Oral Facial Hygiene/Grooming: Moderate assistance  Bathing: Maximum assistance  Upper Body Dressing: Maximum assistance  Lower Body Dressing: Total assistance  Toileting: Maximum assistance Instrumental ADL  Meal Preparation: Total assistance  Homemaking:  Total assistance   Grooming/Bathing/Dressing Activities of Daily Living                                   Most Recent Physical Functioning:   Gross Assessment:                  Posture:     Balance:    Bed Mobility:     Wheelchair Mobility:     Transfers:               Patient Vitals for the past 6 hrs:   BP BP Patient Position SpO2 Pulse   01/30/20 1200 138/81 At rest 97 % 62       Mental Status  Neurologic State: Alert  Orientation Level: Oriented X4  Cognition: Follows commands  Perception: Cues to maintain midline in standing  Perseveration: No perseveration noted  Safety/Judgement: Awareness of environment, Fall prevention, Insight into deficits                          Physical Skills Involved:  1. Range of Motion  2. Balance  3. Strength  4. Activity Tolerance  5. Fine Motor Control  6. Gross Motor Control Cognitive Skills Affected (resulting in the inability to perform in a timely and safe manner):  1. Perception  2. Expression Psychosocial Skills Affected:  1. Habits/Routines  2. Environmental Adaptation  3. Social Interaction  4. Emotional Regulation  5. Self-Awareness  6. Awareness of Others  7. Social Roles   Number of elements that affect the Plan of Care: 5+:  HIGH COMPLEXITY   CLINICAL DECISION MAKING:   [facility] AM-PAC 6 Clicks   Daily Activity Inpatient Short Form  How much help from another person does the patient currently need. .. Total A Lot A Little None   1. Putting on and taking off regular lower body clothing? [x] 1   [] 2   [] 3   [] 4   2. Bathing (including washing, rinsing, drying)? [] 1   [x] 2   [] 3   [] 4   3. Toileting, which includes using toilet, bedpan or urinal?   [] 1   [x] 2   [] 3   [] 4   4. Putting on and taking off regular upper body clothing? [] 1   [x] 2   [] 3   [] 4   5. Taking care of personal grooming such as brushing teeth? [] 1   [x] 2   [] 3   [] 4   6. Eating meals? [] 1   [] 2   [x] 3   [] 4   © 2007, Trustees of [facility], under license to [employer] ([employer]). All rights reserved      Score:  Initial: 11 Most Recent: 12 (Date: 1/22/2020 )    Interpretation of Tool:  Represents activities that are increasingly more difficult (i.e. Bed mobility, Transfers, Gait). Medical Necessity:     · Patient demonstrates   · good and excellent  ·  rehab potential due to higher previous functional level.   Reason for Services/Other Comments:  · Patient continues to require skilled intervention due to   · Decreased independence with ADL/functional transfers that impacts overall quality of life. · .   Use of outcome tool(s) and clinical judgement create a POC that gives a: MODERATE COMPLEXITY         TREATMENT:   (In addition to Assessment/Re-Assessment sessions the following treatments were rendered)     Pre-treatment Symptoms/Complaints:    Pain: Initial:   Pain Intensity 1: 0  Post Session:  0     Self Care: (25 minutes): Procedure(s) (per grid) utilized to improve and/or restore self-care/home management as related to dressing and bathing. Required maximal visual, verbal and physical assistance cueing to facilitate activities of daily living skills and compensatory activities. Therapeutic Activity: (    35 minutes): Therapeutic activities including Bed transfers and sit to stand, weight bearing R UE with weight shift in sitting & standing to improve mobility, strength and balance. Required maximal   to promote static and dynamic balance in sitting and standing. Date:  1/27/2020 Date:   Date:     Activity/Exercise Parameters Parameters Parameters   LUE finger flexion, AROM gravity minimized then AAROM to complete ROM 2x10 reps     LUE finger extension AAROM 2x10 reps     LUE wrist extension, AROM gravity minimized then AAROM to complete ROM 1x10 reps     LUE wrist extension, AROM against gravity  1x10 reps     LUE wrist flexion AAROM 2x10 reps     LUE elbow flexion/extension PROM x10 reps     LUE shoulder flexion PROM x10 reps                      Braces/Orthotics/Lines/Etc:   · LUE sling   · O2 device: Room air  · Treatment/Session Assessment:    · Response to Treatment:  Pt demonstrated good participation.    · Interdisciplinary Collaboration:   o Physical Therapist  o Registered Nurse  o Rehabilitation Attendant  o Certified Nursing Assistant/Patient Care Technician  · After treatment position/precautions:   o Supine in bed  o Bed alarm/tab alert on  o Bed/Chair-wheels locked  o Call light within reach  o RN notified  o Family at bedside  o Nurse at bedside   · Compliance with Program/Exercises: Compliant all of the time, Will assess as treatment progresses. · Recommendations/Intent for next treatment session: \"Next visit will focus on advancements to more challenging activities and reduction in assistance provided\".   Total Treatment Duration:  OT Patient Time In/Time Out  Time In: 1335  Time Out: [address] [facility] [address] OT

## 2020-01-31 LAB — GLUCOSE BLD STRIP.AUTO-MCNC: 89 MG/DL (ref 65–100)

## 2020-01-31 PROCEDURE — 74011250637 HC RX REV CODE- 250/637: Performed by: HOSPITALIST

## 2020-01-31 PROCEDURE — 74011250636 HC RX REV CODE- 250/636: Performed by: INTERNAL MEDICINE

## 2020-01-31 PROCEDURE — 74011250637 HC RX REV CODE- 250/637: Performed by: INTERNAL MEDICINE

## 2020-01-31 PROCEDURE — 65270000029 HC RM PRIVATE

## 2020-01-31 PROCEDURE — 82962 GLUCOSE BLOOD TEST: CPT

## 2020-01-31 PROCEDURE — 92507 TX SP LANG VOICE COMM INDIV: CPT

## 2020-01-31 PROCEDURE — 97530 THERAPEUTIC ACTIVITIES: CPT

## 2020-01-31 RX ORDER — ACETAMINOPHEN 325 MG/1
650 TABLET ORAL EVERY 6 HOURS
Status: DISCONTINUED | OUTPATIENT
Start: 2020-01-31 | End: 2020-02-23

## 2020-01-31 RX ADMIN — ACETAMINOPHEN 650 MG: 325 TABLET, FILM COATED ORAL at 17:28

## 2020-01-31 RX ADMIN — ENOXAPARIN SODIUM 40 MG: 40 INJECTION SUBCUTANEOUS at 14:48

## 2020-01-31 RX ADMIN — ASPIRIN 81 MG 81 MG: 81 TABLET ORAL at 08:40

## 2020-01-31 RX ADMIN — METFORMIN HYDROCHLORIDE 500 MG: 500 TABLET, FILM COATED ORAL at 08:40

## 2020-01-31 RX ADMIN — METOPROLOL SUCCINATE 25 MG: 25 TABLET, FILM COATED, EXTENDED RELEASE ORAL at 08:40

## 2020-01-31 RX ADMIN — ATORVASTATIN CALCIUM 80 MG: 80 TABLET, FILM COATED ORAL at 22:56

## 2020-01-31 RX ADMIN — METFORMIN HYDROCHLORIDE 500 MG: 500 TABLET, FILM COATED ORAL at 17:28

## 2020-01-31 RX ADMIN — GUAIFENESIN 100 MG: 100 SOLUTION ORAL at 17:31

## 2020-01-31 RX ADMIN — LISINOPRIL 40 MG: 20 TABLET ORAL at 08:39

## 2020-01-31 RX ADMIN — ACETAMINOPHEN 650 MG: 325 TABLET, FILM COATED ORAL at 11:53

## 2020-01-31 NOTE — PROGRESS NOTES
01/31/20 1851   NIH Stroke Scale   Interval Other (comment)  (Dual NIH with Abrahan Guillen RN)   LOC 0   LOC Questions 0   LOC Commands 0   Best Gaze 0   Visual 0   Facial Palsy 1   Motor Right Arm 0   Motor Left Arm 2   Motor Right Leg 0   Motor Left Leg 2   Limb Ataxia 0   Sensory 0   Best Language 1   Dysarthria 1   Extinction and Inattention 0   Total 7

## 2020-01-31 NOTE — PROGRESS NOTES
Problem: Patient Education: Go to Patient Education Activity  Goal: Patient/Family Education  Outcome: Progressing Towards Goal     Problem: Patient Education: Go to Patient Education Activity  Goal: Patient/Family Education  Outcome: Progressing Towards Goal     Problem: Patient Education: Go to Patient Education Activity  Goal: Patient/Family Education  Outcome: Progressing Towards Goal     Problem: Diabetes Self-Management  Goal: *Disease process and treatment process  Description  Define diabetes and identify own type of diabetes; list 3 options for treating diabetes. Outcome: Progressing Towards Goal  Goal: *Incorporating nutritional management into lifestyle  Description  Describe effect of type, amount and timing of food on blood glucose; list 3 methods for planning meals. Outcome: Progressing Towards Goal  Goal: *Incorporating physical activity into lifestyle  Description  State effect of exercise on blood glucose levels. Outcome: Progressing Towards Goal  Goal: *Developing strategies to promote health/change behavior  Description  Define the ABC's of diabetes; identify appropriate screenings, schedule and personal plan for screenings. Outcome: Progressing Towards Goal  Goal: *Using medications safely  Description  State effect of diabetes medications on diabetes; name diabetes medication taking, action and side effects. Outcome: Progressing Towards Goal  Goal: *Monitoring blood glucose, interpreting and using results  Description  Identify recommended blood glucose targets  and personal targets. Outcome: Progressing Towards Goal  Goal: *Prevention, detection, treatment of acute complications  Description  List symptoms of hyper- and hypoglycemia; describe how to treat low blood sugar and actions for lowering  high blood glucose level.   Outcome: Progressing Towards Goal  Goal: *Prevention, detection and treatment of chronic complications  Description  Define the natural course of diabetes and describe the relationship of blood glucose levels to long term complications of diabetes.   Outcome: Progressing Towards Goal  Goal: *Developing strategies to address psychosocial issues  Description  Describe feelings about living with diabetes; identify support needed and support network  Outcome: Progressing Towards Goal     Problem: Patient Education: Go to Patient Education Activity  Goal: Patient/Family Education  Outcome: Progressing Towards Goal     Problem: Patient Education: Go to Patient Education Activity  Goal: Patient/Family Education  Outcome: Progressing Towards Goal     Problem: Nutrition Deficit  Goal: *Optimize nutritional status  Outcome: Progressing Towards Goal     Problem: Patient Education: Go to Patient Education Activity  Goal: Patient/Family Education  Outcome: Progressing Towards Goal     Problem: General Medical Care Plan  Goal: *Vital signs within specified parameters  Outcome: Progressing Towards Goal  Goal: *Labs within defined limits  Outcome: Progressing Towards Goal  Goal: *Absence of infection signs and symptoms  Description  Wash hand more often   Outcome: Progressing Towards Goal  Goal: *Optimal pain control at patient's stated goal  Outcome: Progressing Towards Goal  Goal: *Skin integrity maintained  Outcome: Progressing Towards Goal  Goal: *Fluid volume balance  Outcome: Progressing Towards Goal  Goal: *Optimize nutritional status  Outcome: Progressing Towards Goal  Goal: *Anxiety reduced or absent  Outcome: Progressing Towards Goal  Goal: *Progressive mobility and function (eg: ADL's)  Outcome: Progressing Towards Goal     Problem: Patient Education: Go to Patient Education Activity  Goal: Patient/Family Education  Outcome: Progressing Towards Goal     Problem: Patient Education: Go to Patient Education Activity  Goal: Patient/Family Education  Outcome: Progressing Towards Goal     Problem: Patient Education: Go to Patient Education Activity  Goal: Patient/Family Education  Outcome: Progressing Towards Goal     Problem: Patient Education: Go to Patient Education Activity  Goal: Patient/Family Education  Outcome: Progressing Towards Goal     Problem: Ischemic Stroke: Discharge Outcomes  Goal: *Verbalizes anxiety and depression are reduced or absent  Outcome: Progressing Towards Goal  Goal: *Verbalize understanding of risk factor modification(Stroke Metric)  Outcome: Progressing Towards Goal  Goal: *Hemodynamically stable  Outcome: Progressing Towards Goal  Goal: *Absence of aspiration pneumonia  Outcome: Progressing Towards Goal  Goal: *Aware of needed dietary changes  Outcome: Progressing Towards Goal  Goal: *Verbalize understanding of prescribed medications including anti-coagulants, anti-lipid, and/or anti-platelets(Stroke Metric)  Outcome: Progressing Towards Goal  Goal: *Tolerating diet  Outcome: Progressing Towards Goal  Goal: *Aware of follow-up diagnostics related to anticoagulants  Outcome: Progressing Towards Goal  Goal: *Ability to perform ADLs and demonstrates progressive mobility and function  Outcome: Progressing Towards Goal  Goal: *Absence of DVT(Stroke Metric)  Outcome: Progressing Towards Goal  Goal: *Absence of aspiration  Outcome: Progressing Towards Goal  Goal: *Optimal pain control at patient's stated goal  Outcome: Progressing Towards Goal  Goal: *Home safety concerns addressed  Outcome: Progressing Towards Goal  Goal: *Describes available resources and support systems  Outcome: Progressing Towards Goal  Goal: *Verbalizes understanding of activation of EMS(911) for stroke symptoms(Stroke Metric)  Outcome: Progressing Towards Goal  Goal: *Understands and describes signs and symptoms to report to providers(Stroke Metric)  Outcome: Progressing Towards Goal  Goal: *Neurolgocially stable (absence of additional neurological deficits)  Outcome: Progressing Towards Goal  Goal: *Verbalizes importance of follow-up with primary care physician(Stroke Metric)  Outcome: Progressing Towards Goal  Goal: *Smoking cessation discussed,if applicable(Stroke Metric)  Outcome: Progressing Towards Goal  Goal: *Depression screening completed(Stroke Metric)  Outcome: Progressing Towards Goal     Problem: Pain  Goal: *Control of Pain  Outcome: Progressing Towards Goal     Problem: Patient Education: Go to Patient Education Activity  Goal: Patient/Family Education  Outcome: Progressing Towards Goal

## 2020-01-31 NOTE — PROGRESS NOTES
Pt received letter from [facility] program with request for additional information (bank statement and letter from unemployment). Pt's spouse provided copies of bank statements. SW delivered them to the business office at [facility]. Pt;'s spouse stated that she had filled out some paperwork for medicaid but she did not know exactly what. SW to continue to follow.

## 2020-01-31 NOTE — PROGRESS NOTES
Progress Note    2020  Admit Date: 2019  9:07 AM   NAME: Kath Huizar   :  1975   MRN:  [id number]   Attending: Mook Chew MD  PCP:  Willow Paniagua MD  Treatment Team: Attending Provider: Mook Chew MD; Care Manager: Mi Hoyos, RN; Care Manager: Klaudia Adair, [facility] [place] Consulting Provider: Nataly Da Silva MD; Utilization Review: Ellen Morataya RN; Nurse Practitioner: Melanie Gustafson NP; Charge Nurse: Max Samson, RN; Charge Nurse: Louise Nails    Full Code     This is a 54 yo male with PMH of DM II, HTN who presented 19 with c/o left leg and arm weakness, left facial droop and dysarthria. Code S called. MRI with acute infarctions in left cerebellar hemisphere, deep frontoparietal white matter and right paramedian yariel. Also noted to have old lacunar infarcts. CTA without large vessel occlusions, however some stenosis noted. Echo showed PFO, Cardiology to f/u outpatient for evaluation for closure. Neurology consulted. Started on ASA, statin. Pt is uninsured, difficult placement, case management working on plan. SUBJECTIVE:     Patient reports feeling well. As per the nurse he is working with PT and OT able to sit at the edge of the bed with some improvement in the range of motion. No fever, diarrhea, nausea/vomiting, chills, chest pain, palpitations. ROS: 10 point ROS negative except what mentioned above. Past Medical History:   Diagnosis Date    Acute ischemic stroke ([address] 2019    Acute pancreatitis 2014    Cerebrovascular accident (CVA) due to embolism ([address] 2019    Diabetes ([address] 2002    Diabetes ([address]     Diabetes mellitus     Hypertension      No results found for this or any previous visit (from the past 25 hour(s)).   No Known Allergies  Current Facility-Administered Medications   Medication Dose Route Frequency Provider Last Rate Last Dose    metFORMIN (GLUCOPHAGE) tablet 500 mg  500 mg Oral BID WITH MEALS Art Camilo MD   500 mg at 20 1651    metoprolol succinate (TOPROL-XL) XL tablet 25 mg  25 mg Oral DAILY Art Camilo MD   25 mg at 20 0809    polyethylene glycol (MIRALAX) packet 17 g  17 g Oral DAILY PRN Eliu Graves MARCIANO    17 g at 20 8693    guaiFENesin (ROBITUSSIN) 100 mg/5 mL oral liquid 100 mg  100 mg Oral Q4H PRN Art Camilo MD   100 mg at 20 1815    hydrALAZINE (APRESOLINE) 20 mg/mL injection 20 mg  20 mg IntraVENous Q4H PRN Artie Sow MD   20 mg at 19 0133    atorvastatin (LIPITOR) tablet 80 mg  80 mg Oral QHS Artie Sow MD   80 mg at 20 2243    lisinopril (PRINIVIL, ZESTRIL) tablet 40 mg  40 mg Oral DAILY Artie Sow MD   40 mg at 20 0809    sodium chloride (NS) flush 5-40 mL  5-40 mL IntraVENous PRN Artie Sow MD   10 mL at 20 2243    ondansetron [facility]) injection 4 mg  4 mg IntraVENous Q4H PRN Artie Sow MD        aspirin chewable tablet 81 mg  81 mg Oral DAILY Artie Sow MD   81 mg at 20 0809    acetaminophen (TYLENOL) tablet 650 mg  650 mg Oral Q4H PRN Artie Sow MD   650 mg at 20 1816    enoxaparin (LOVENOX) injection 40 mg  40 mg SubCUTAneous Q24H Artie Sow MD   40 mg at 20 1430    dextrose 40% (GLUTOSE) oral gel 1 Tube  15 g Oral PRN Artie Sow MD        glucagon [facility]) injection 1 mg  1 mg IntraMUSCular PRN Artie Sow MD        dextrose (D50W) injection syrg 12.5-25 g  25-50 mL IntraVENous PRN Artie Sow MD           Review of Systems negative with exception of pertinent positives noted above  PHYSICAL EXAM     Visit Vitals  /90 (BP 1 Location: Right arm, BP Patient Position: At rest)   Pulse 65   Temp 97.6 °F (36.4 °C)   Resp 16   Ht 5' 6\" (1.676 m)   Wt 95.3 kg (210 lb)   SpO2 97%   BMI 33.89 kg/m²      Temp (24hrs), Av.9 °F (36.6 °C), Min:97.6 °F (36.4 °C), Max:98.1 °F (36.7 °C)    Oxygen Therapy  O2 Sat (%): 97 % (01/31/20 0400)  Pulse via Oximetry: 56 beats per minute (01/05/20 1918)  O2 Device: Room air (01/05/20 1930)    Intake/Output Summary (Last 24 hours) at 1/31/2020 0717  Last data filed at 1/30/2020 1823  Gross per 24 hour   Intake 360 ml   Output    Net 360 ml      General: No acute distress    Lungs: CTA bilaterally. Resp even and unlabored  Heart:  S1S2 present without murmurs rubs gallops. RRR. No LE edema  Abdomen: Soft, non tender, non distended. BS present  Extremities: No cyanosis. Left UE/LE weakness 1/5  Neurologic:  A/O X3. Dysarthria noted. Left facial droop. Sensation intact. LUE/LLE strength 2/5. Results summary of Diagnostic Studies/Procedures copied from within [facility] EMR:        [address] Problems    Diagnosis Date Noted    PFO (patent foramen ovale) 12/17/2019    Arrhythmia 12/15/2019    Hyperlipemia 72/07/3074    Acute embolic stroke ([facility] 76/25/0195    Type 2 diabetes mellitus (HCC)     Hypertension      Plan:    -Switching Tylenol as needed to every 6 hours scheduled as patient is frequently asking for it for left shoulder pain. Discussed with patient's nurse    -Acute embolic stroke: POA  Dysphagia   MRI head: with acute infarcts in L cerebellar hemisphere, deep frontoparietal white matter, and R paramedian yariel.  Also noted old lacunar infarcts. ISMAEL: 3 mm PFO, needs to f/u outpatient with Cardiology for evaluation for closure   Cont statin, ASA  PT/OT/Speech     -Hypertension: continue home meds      -Type 2 diabetes mellitus:  Metformin     -Arrhythmia: had brief wide complex tachycardia early in admission, will need event monitor as outpt per cards    DVT Prophylaxis: lovenox    DISPO: Difficult placement, case management working on plan. Patient has no medical insurance. Pt can't go home as pt's wife is disabled and is not safe at home without supervision as pt needs long term care.       Patsy Brewer Shan Ojeda MD

## 2020-01-31 NOTE — PROGRESS NOTES
01/31/20 0653   NIH Stroke Scale   Interval Other (comment)   LOC 0   LOC Questions 0   LOC Commands 0   Best Gaze 0   Visual 0   Facial Palsy 1   Motor Right Arm 0   Motor Left Arm 2   Motor Right Leg 0   Motor Left Leg 2   Limb Ataxia 0   Sensory 0   Best Language 1   Dysarthria 0   Extinction and Inattention 1   Total 7     Dual shift change NIH with Norman Desir RN.

## 2020-01-31 NOTE — PROGRESS NOTES
Problem: Mobility Impaired (Adult and Pediatric)  Goal: *Acute Goals and Plan of Care  Description  Acute PT Goals (reviewed & updated 1/29/2020):  STG:  (1.) Mr. Elvin Farooq will perform bed mobility with MINIMAL ASSISTANCE within 3 treatment day(s). (2.) Mr. Elvin Farooq will demonstrate good dynamic sitting balance within 3 treatment days. (3.) Mr. Elvin Farooq will transfer sit to stand with MINIMAL ASSISTANCE X1 within 3 treatment days. (4.)Mr. Faye Wu will transfer from bed to chair and chair to bed with MODERATE ASSIST x1 using the least restrictive device within 3 treatment day(s). LTG:  (1.) Mr. Elvin Farooq will perform bed mobility with CONTACT GUARD ASSISTANCE within 7 treatment day(s). (2.) Mr. Elvin Farooq will transfer from bed to chair and chair to bed with MINIMAL ASSIST using the least restrictive device within 7 treatment day(s). (3.) Mr. Elvin Farooq will ambulate with MODERATE ASSIST for 5+ feet with the least restrictive device to assist with transfers within 7 treatment day(s). (4.) Mr. Elvin Farooq will perform standing static and dynamic balance activities x 10 minutes with MINIMAL ASSIST to improve safety within 7 treatment day(s). (5.)Mr. Elvin Farooq will demonstrate FAIR DYNAMIC STANDING balance within 7 treatment days. (6.) Mr. Elvin Farooq will tolerate 25+ minutes of neuromuscular re-education and/or therapeutic activity/exercise while maintaining stable vitals within 7 treatment day(s).      PHYSICAL THERAPY: Daily Note and PM 1/31/2020  INPATIENT: PT Visit Days : 3  Payor: MEDICAID PENDING / Plan: [employer] PENDING / Product Type: Medicaid /       NAME/AGE/GENDER: Samantha Talavera is a 53 y.o. male   PRIMARY DIAGNOSIS: Acute ischemic stroke ([address] [I63.9]  Cerebrovascular accident (CVA) due to embolism ([address] [E87.9] Acute embolic stroke ([address] Acute embolic stroke ([address]       ICD-10: Treatment Diagnosis:   · Other lack of cordination (R27.8)  · Difficulty in walking, Not elsewhere classified (R26.2)  · Other abnormalities of gait and mobility (R26.89)  · Unspecified Lack of Coordination (R27.9)  · Hemiplegia and hemiparesis following cerebral infarction affecting   · left non-dominant side (U77.965)   Precaution/Allergies:  Patient has no known allergies. ASSESSMENT:     Mr. Luc Gómez is a 53year old admitted with acute CVA with left hemiparesis and left inattention. Patient was supine upon contact and agreeable to PT. Patient performs supine to sit with mod assist, additional time, and cues to utilize compensatory strategies to assist with bed mobility. Poor carry-over from previous treatment about compensatory strategies. Patient sits EOB working on dynamic sitting balance with +LOB when working on balance to the left. Patient also performs several sit to stand transfers (x5) with cues for technique/hand placement. Once standing patient worked on pre-gait activity including weight shifts, static/dynamic standing balance activities. Patient demonstrates poor balance in standing as well as poor activity tolerance. Patient able to take side steps towards [facility] x 2' with luke walker, mod-max assist, gait belt for safety, and max cues for technique/sequencing. Patient also participates in therapeutic strengthening exercises to improve functional strength for transfers, gait and overall mobility. Patient requires cues and assistnace to perform exercises correctly. Overall slow progress towards physical therapy goals. Patient's goals listed above are still appropriate. Will continue skilled PT to address remaining deficits. This section established at most recent assessment   PROBLEM LIST (Impairments causing functional limitations):  1. Decreased Strength  2. Decreased ADL/Functional Activities  3. Decreased Transfer Abilities  4. Decreased Ambulation Ability/Technique  5. Decreased Balance  6. Increased Pain  7. Decreased Activity Tolerance  8. Increased Fatigue  9.  Decreased Flexibility/Joint Mobility   INTERVENTIONS PLANNED: (Benefits and precautions of physical therapy have been discussed with the patient.)  1. Balance Exercise  2. Bed Mobility  3. Family Education  4. Gait Training  5. Home Exercise Program (HEP)  6. Manual Therapy  7. Neuromuscular Re-education/Strengthening  8. Range of Motion (ROM)  9. Therapeutic Activites  10. Therapeutic Exercise/Strengthening  11. Transfer Training     TREATMENT PLAN: Frequency/Duration: 3 times a week for duration of hospital stay  Rehabilitation Potential For Stated Goals: Good     REHAB RECOMMENDATIONS (at time of discharge pending progress):    Placement: It is my opinion, based on this patient's performance to date, that Mr. Wendy Fischer may benefit from intensive therapy at an [address] [place] [address] after discharge due to a probable need for 24 hour rehab nursing, a probable need for multiple therapy disciplines, and potential to make ongoing and sustainable functional improvement that is of practical value. .  Equipment:    TBD pending progress with therapy. HISTORY:   History of Present Injury/Illness (Reason for Referral):  Per H&P: \"Pt is a 54 y/o smoker with DM, HTN, who presented to ER with L leg and arm weakness, L facial droop, dysarthria. First noted L leg weakness late night 12/11 when he woke up to go to the bathroom. He was normal when he went to bed around 1030. Woke up this morning and had persistent weakness L leg and also now noted in L arm. EMS called. Noted to have slurred speech and L facial droop as well. Code S called in ER around 9am.  MRI with acute infarcts in L cerebellar hemisphere, deep frontoparietal white matter, and R paramedian yariel. Also noted old lacunar infarcts. No large vessel occlusion on CTA, but some stenosis noted. No hx afib, TIA, CVA. No CP, palpitations, SOB. \"  Past Medical History/Comorbidities:   Mr. Wendy Fischer  has a past medical history of Acute ischemic stroke ([facility] (12/12/2019), Acute pancreatitis (11/19/2014), Cerebrovascular accident (CVA) due to embolism ([facility] (12/12/2019), Diabetes ([facility] (2002), Diabetes ([facility] Diabetes mellitus, and Hypertension. He also has no past medical history of Arthritis, Asthma, Autoimmune disease ([address] CAD (coronary artery disease), Cancer ([facility] Chronic kidney disease, COPD, Dementia, Dementia ([address] Heart failure ([address] Ill-defined condition, Infectious disease, Liver disease, Other ill-defined conditions(799.89), Psychiatric disorder, PUD (peptic ulcer disease), Seizures ([facility] or Sleep disorder. Mr. Juvencio Saavedra  has a past surgical history that includes hx hernia repair and hx orthopaedic. Social History/Living Environment:   Home Environment: Apartment  # Steps to Enter: 12  Rails to Enter: Yes  [employer] : Bilateral  One/Two Story Residence: One story  Living Alone: No  Support Systems: Spouse/Significant Other/Partner  Patient Expects to be Discharged to[de-identified] Unknown  Current DME Used/Available at Home: None  Tub or Shower Type: Tub/Shower combination  Prior Level of Function/Work/Activity:  Independent, lives with wife in 2nd story 1 level apartment. No recent falls. Number of Personal Factors/Comorbidities that affect the Plan of Care: 1-2: MODERATE COMPLEXITY   EXAMINATION:   Most Recent Physical Functioning:   Gross Assessment:                  Posture:     Balance:  Sitting: Impaired  Sitting - Static: Good (unsupported)  Sitting - Dynamic: Fair (occasional)  Standing: Impaired  Standing - Static: Poor  Standing - Dynamic : Poor Bed Mobility:  Supine to Sit: Moderate assistance  Sit to Supine: Moderate assistance  Wheelchair Mobility:     Transfers:  Sit to Stand: Moderate assistance  Stand to Sit: Moderate assistance  Gait:            Body Structures Involved:  1. Nerves  2. Voice/Speech  3. Bones  4. Joints  5. Muscles Body Functions Affected:  1. Mental  2. Sensory/Pain  3. Neuromusculoskeletal  4.  Movement Related Activities and Participation Affected:  1. General Tasks and Demands  2. Mobility  3. Self Care  4. Interpersonal Interactions and Relationships   Number of elements that affect the Plan of Care: 4+: HIGH COMPLEXITY   CLINICAL PRESENTATION:   Presentation: Evolving clinical presentation with changing clinical characteristics: MODERATE COMPLEXITY   CLINICAL DECISION MAKIN [facility] Mobility Inpatient Short Form  How much difficulty does the patient currently have. .. Unable A Lot A Little None   1. Turning over in bed (including adjusting bedclothes, sheets and blankets)? [] 1   [] 2   [x] 3   [] 4   2. Sitting down on and standing up from a chair with arms ( e.g., wheelchair, bedside commode, etc.)   [] 1   [x] 2   [] 3   [] 4   3. Moving from lying on back to sitting on the side of the bed? [] 1   [] 2   [x] 3   [] 4   How much help from another person does the patient currently need. .. Total A Lot A Little None   4. Moving to and from a bed to a chair (including a wheelchair)? [] 1   [x] 2   [] 3   [] 4   5. Need to walk in hospital room? [] 1   [x] 2   [] 3   [] 4   6. Climbing 3-5 steps with a railing? [] 1   [x] 2   [] 3   [] 4   © , Trustees of [facility], under license to [employer]. All rights reserved      Score:  Initial: 13 Most Recent: 14 (Date:2020)    Interpretation of Tool:  Represents activities that are increasingly more difficult (i.e. Bed mobility, Transfers, Gait). Medical Necessity:     · Patient is expected to demonstrate progress in   · strength, range of motion, balance, coordination, and functional technique  ·  to   · increase independence with all mobility. · .  Reason for Services/Other Comments:  · Patient continues to require skilled intervention due to   · medical complications and mobility deficits which impact his level of function, safety, and independence as indicated above.    · .   Use of outcome tool(s) and clinical judgement create a POC that gives a: Questionable prediction of patient's progress: MODERATE COMPLEXITY        TREATMENT:      Pre-treatment Symptoms/Complaints:  Fatigued  Pain: Initial: None Post Session:  None     Therapeutic Activity: (    23 minutes):  Therapeutic activities including bed mobility training, transfer training, static/dynamic sitting/standing balance, scooting, posture training, LE positioning, LE exercises, and patient education to improve mobility, strength, balance and coordination.  Required moderate verbal, tactile and manual cues   to promote dynamic balance in standing and promote coordination of bilateral, lower extremity(s). Braces/Orthotics/Lines/Etc:   · O2 Device: Room Air   Treatment/Session Assessment:    · Response to Treatment:  See above  · Interdisciplinary Collaboration:   o Physical Therapy Assistant  o Registered Nurse  · After treatment position/precautions:   o Supine in bed  o Bed alarm/tab alert on  o Bed/Chair-wheels locked  o Bed in low position  o Call light within reach  o RN notified  o Family at bedside   · Compliance with Program/Exercises: Compliant all of the time  · Recommendations/Intent for next treatment session: \"Next visit will focus on advancements to more challenging activities and reduction in assistance provided\".     Total Treatment Duration:  PT Patient Time In/Time Out  Time In: 1320  Time Out: [address] [facility]

## 2020-01-31 NOTE — PROGRESS NOTES
Notified patient that he does not need to come in for lab appointment on 8/12/19 as he already completed pre-clinic labs on 7/31/19.    Problem: Patient Education: Go to Patient Education Activity  Goal: Patient/Family Education  1/30/2020 2316 by Angel Moctezuma RN  Outcome: Progressing Towards Goal  1/30/2020 2315 by Angel Moctezuma RN  Outcome: Progressing Towards Goal     Problem: Patient Education: Go to Patient Education Activity  Goal: Patient/Family Education  1/30/2020 2316 by Angel Moctezuma RN  Outcome: Progressing Towards Goal  1/30/2020 2315 by Angel Moctezuma RN  Outcome: Progressing Towards Goal     Problem: Patient Education: Go to Patient Education Activity  Goal: Patient/Family Education  1/30/2020 2316 by Angel Moctezuma RN  Outcome: Progressing Towards Goal  1/30/2020 2315 by Angel Moctezuma RN  Outcome: Progressing Towards Goal     Problem: Diabetes Self-Management  Goal: *Disease process and treatment process  Description  Define diabetes and identify own type of diabetes; list 3 options for treating diabetes. 1/30/2020 2316 by Angel Moctezuma RN  Outcome: Progressing Towards Goal  1/30/2020 2315 by Angel Moctezuma RN  Outcome: Progressing Towards Goal  Goal: *Incorporating nutritional management into lifestyle  Description  Describe effect of type, amount and timing of food on blood glucose; list 3 methods for planning meals. 1/30/2020 2316 by Angel Moctezuma RN  Outcome: Progressing Towards Goal  1/30/2020 2315 by Angel Moctezuma RN  Outcome: Progressing Towards Goal  Goal: *Incorporating physical activity into lifestyle  Description  State effect of exercise on blood glucose levels. 1/30/2020 2316 by Angel Moctezuma RN  Outcome: Progressing Towards Goal  1/30/2020 2315 by Angel Moctezuma RN  Outcome: Progressing Towards Goal  Goal: *Developing strategies to promote health/change behavior  Description  Define the ABC's of diabetes; identify appropriate screenings, schedule and personal plan for screenings.   1/30/2020 2316 by Angel Moctezuma RN  Outcome: Progressing Towards Goal  1/30/2020 2315 by Jolie Ordoñez Floridalma Lizama RN  Outcome: Progressing Towards Goal  Goal: *Using medications safely  Description  State effect of diabetes medications on diabetes; name diabetes medication taking, action and side effects. 1/30/2020 2316 by Javi Alegria RN  Outcome: Progressing Towards Goal  1/30/2020 2315 by Javi Alegria RN  Outcome: Progressing Towards Goal  Goal: *Monitoring blood glucose, interpreting and using results  Description  Identify recommended blood glucose targets  and personal targets. 1/30/2020 2316 by Javi Alegria RN  Outcome: Progressing Towards Goal  1/30/2020 2315 by Javi Alegria RN  Outcome: Progressing Towards Goal  Goal: *Prevention, detection, treatment of acute complications  Description  List symptoms of hyper- and hypoglycemia; describe how to treat low blood sugar and actions for lowering  high blood glucose level. 1/30/2020 2316 by Javi Alegria RN  Outcome: Progressing Towards Goal  1/30/2020 2315 by Javi Alegria RN  Outcome: Progressing Towards Goal  Goal: *Prevention, detection and treatment of chronic complications  Description  Define the natural course of diabetes and describe the relationship of blood glucose levels to long term complications of diabetes.   1/30/2020 2316 by Javi Alegria RN  Outcome: Progressing Towards Goal  1/30/2020 2315 by Javi Alegria RN  Outcome: Progressing Towards Goal  Goal: *Developing strategies to address psychosocial issues  Description  Describe feelings about living with diabetes; identify support needed and support network  1/30/2020 2316 by Javi Alegria RN  Outcome: Progressing Towards Goal  1/30/2020 2315 by Javi Alegria RN  Outcome: Progressing Towards Goal     Problem: Patient Education: Go to Patient Education Activity  Goal: Patient/Family Education  1/30/2020 2316 by Javi Alegria RN  Outcome: Progressing Towards Goal  1/30/2020 2315 by Javi Alegria RN  Outcome: Progressing Towards Goal     Problem: Patient Education: Go to Patient Education Activity  Goal: Patient/Family Education  1/30/2020 2316 by Taj Ann RN  Outcome: Progressing Towards Goal  1/30/2020 2315 by Taj Ann RN  Outcome: Progressing Towards Goal     Problem: Nutrition Deficit  Goal: *Optimize nutritional status  1/30/2020 2316 by Taj Ann RN  Outcome: Progressing Towards Goal  1/30/2020 2315 by Taj Ann RN  Outcome: Progressing Towards Goal     Problem: Patient Education: Go to Patient Education Activity  Goal: Patient/Family Education  1/30/2020 2316 by Taj Ann RN  Outcome: Progressing Towards Goal  1/30/2020 2315 by Taj Ann RN  Outcome: Progressing Towards Goal     Problem: General Medical Care Plan  Goal: *Vital signs within specified parameters  1/30/2020 2316 by Taj Ann RN  Outcome: Progressing Towards Goal  1/30/2020 2315 by Taj Ann RN  Outcome: Progressing Towards Goal  Goal: *Labs within defined limits  1/30/2020 2316 by Taj Ann RN  Outcome: Progressing Towards Goal  1/30/2020 2315 by Taj Ann RN  Outcome: Progressing Towards Goal  Goal: *Absence of infection signs and symptoms  Description  Wash hand more often   1/30/2020 2316 by Taj Ann RN  Outcome: Progressing Towards Goal  1/30/2020 2315 by Taj Ann RN  Outcome: Progressing Towards Goal  Goal: *Optimal pain control at patient's stated goal  1/30/2020 2316 by Taj Ann RN  Outcome: Progressing Towards Goal  1/30/2020 2315 by Taj Ann RN  Outcome: Progressing Towards Goal  Goal: *Skin integrity maintained  1/30/2020 2316 by Taj Ann RN  Outcome: Progressing Towards Goal  1/30/2020 2315 by Taj Ann RN  Outcome: Progressing Towards Goal  Goal: *Fluid volume balance  1/30/2020 2316 by Taj Ann RN  Outcome: Progressing Towards Goal  1/30/2020 2315 by Taj Ann RN  Outcome: Progressing Towards Goal  Goal: *Optimize nutritional status  1/30/2020 2316 by Bello Mercado RN  Outcome: Progressing Towards Goal  1/30/2020 2315 by Bello Mercado RN  Outcome: Progressing Towards Goal  Goal: *Anxiety reduced or absent  1/30/2020 2316 by Bello Mercado RN  Outcome: Progressing Towards Goal  1/30/2020 2315 by Bello Mercado RN  Outcome: Progressing Towards Goal  Goal: *Progressive mobility and function (eg: ADL's)  1/30/2020 2316 by Bello Mercado RN  Outcome: Progressing Towards Goal  1/30/2020 2315 by Bello Mercado RN  Outcome: Progressing Towards Goal     Problem: Patient Education: Go to Patient Education Activity  Goal: Patient/Family Education  1/30/2020 2316 by Bello Mercado RN  Outcome: Progressing Towards Goal  1/30/2020 2315 by Bello Mercado RN  Outcome: Progressing Towards Goal     Problem: Patient Education: Go to Patient Education Activity  Goal: Patient/Family Education  1/30/2020 2316 by Bello Mercado RN  Outcome: Progressing Towards Goal  1/30/2020 2315 by Bello Mercado RN  Outcome: Progressing Towards Goal     Problem: Patient Education: Go to Patient Education Activity  Goal: Patient/Family Education  1/30/2020 2316 by Bello Mercado RN  Outcome: Progressing Towards Goal  1/30/2020 2315 by Bello Mercado RN  Outcome: Progressing Towards Goal     Problem: Patient Education: Go to Patient Education Activity  Goal: Patient/Family Education  1/30/2020 2316 by Bello Mercado RN  Outcome: Progressing Towards Goal  1/30/2020 2315 by Bello Mercado RN  Outcome: Progressing Towards Goal     Problem: Ischemic Stroke: Discharge Outcomes  Goal: *Verbalizes anxiety and depression are reduced or absent  1/30/2020 2316 by Bello Mercado RN  Outcome: Progressing Towards Goal  1/30/2020 2315 by Bello Mercado RN  Outcome: Progressing Towards Goal  Goal: *Verbalize understanding of risk factor modification(Stroke Metric)  1/30/2020 2316 by Bello Mercado RN  Outcome: Progressing Towards Goal  1/30/2020 2315 by Bello Mercado RN  Outcome: Progressing Towards Goal  Goal: *Hemodynamically stable  1/30/2020 2316 by Caitlin Corbin RN  Outcome: Progressing Towards Goal  1/30/2020 2315 by Caitlin Corbin RN  Outcome: Progressing Towards Goal  Goal: *Absence of aspiration pneumonia  1/30/2020 2316 by Caitlin Corbin RN  Outcome: Progressing Towards Goal  1/30/2020 2315 by Caitlin Corbni RN  Outcome: Progressing Towards Goal  Goal: *Aware of needed dietary changes  1/30/2020 2316 by Caitlin Corbin RN  Outcome: Progressing Towards Goal  1/30/2020 2315 by Caitlin Corbin RN  Outcome: Progressing Towards Goal  Goal: *Verbalize understanding of prescribed medications including anti-coagulants, anti-lipid, and/or anti-platelets(Stroke Metric)  1/30/2020 2316 by Caitlin Corbin RN  Outcome: Progressing Towards Goal  1/30/2020 2315 by Caitlin Corbin RN  Outcome: Progressing Towards Goal  Goal: *Tolerating diet  1/30/2020 2316 by Caitlin Corbin RN  Outcome: Progressing Towards Goal  1/30/2020 2315 by Caitlin Corbin RN  Outcome: Progressing Towards Goal  Goal: *Aware of follow-up diagnostics related to anticoagulants  1/30/2020 2316 by Caitlin Corbin RN  Outcome: Progressing Towards Goal  1/30/2020 2315 by Caitlin Corbin RN  Outcome: Progressing Towards Goal  Goal: *Ability to perform ADLs and demonstrates progressive mobility and function  1/30/2020 2316 by Caitlin Corbin RN  Outcome: Progressing Towards Goal  1/30/2020 2315 by Caitlin Corbin RN  Outcome: Progressing Towards Goal  Goal: *Absence of DVT(Stroke Metric)  1/30/2020 2316 by Caitlin Corbin RN  Outcome: Progressing Towards Goal  1/30/2020 2315 by Caitlin Corbin RN  Outcome: Progressing Towards Goal  Goal: *Absence of aspiration  1/30/2020 2316 by Caitlin Corbin RN  Outcome: Progressing Towards Goal  1/30/2020 2315 by Caitlin Corbin RN  Outcome: Progressing Towards Goal  Goal: *Optimal pain control at patient's stated goal  1/30/2020 2316 by Caitlin Corbin RN  Outcome: Progressing Towards Goal  1/30/2020 2315 by Alva Stacy RN  Outcome: Progressing Towards Goal  Goal: *Home safety concerns addressed  1/30/2020 2316 by Alva Stacy RN  Outcome: Progressing Towards Goal  1/30/2020 2315 by Alva Stacy RN  Outcome: Progressing Towards Goal  Goal: *Describes available resources and support systems  1/30/2020 2316 by Alva Stacy RN  Outcome: Progressing Towards Goal  1/30/2020 2315 by Alva Stacy RN  Outcome: Progressing Towards Goal  Goal: *Verbalizes understanding of activation of EMS(911) for stroke symptoms(Stroke Metric)  1/30/2020 2316 by Alva Stacy RN  Outcome: Progressing Towards Goal  1/30/2020 2315 by Alva Stacy RN  Outcome: Progressing Towards Goal  Goal: *Understands and describes signs and symptoms to report to providers(Stroke Metric)  1/30/2020 2316 by Alva Stacy RN  Outcome: Progressing Towards Goal  1/30/2020 2315 by Alva Stacy RN  Outcome: Progressing Towards Goal  Goal: *Neurolgocially stable (absence of additional neurological deficits)  1/30/2020 2316 by Alva Stacy RN  Outcome: Progressing Towards Goal  1/30/2020 2315 by Alva Stacy RN  Outcome: Progressing Towards Goal  Goal: *Verbalizes importance of follow-up with primary care physician(Stroke Metric)  1/30/2020 2316 by Alva Stacy RN  Outcome: Progressing Towards Goal  1/30/2020 2315 by Alva Stacy RN  Outcome: Progressing Towards Goal  Goal: *Smoking cessation discussed,if applicable(Stroke Metric)  1/30/2020 2316 by Alva Stacy RN  Outcome: Progressing Towards Goal  1/30/2020 2315 by Alva Stacy RN  Outcome: Progressing Towards Goal  Goal: *Depression screening completed(Stroke Metric)  1/30/2020 2316 by Alva Stacy RN  Outcome: Progressing Towards Goal  1/30/2020 2315 by Alva Stacy RN  Outcome: Progressing Towards Goal     Problem: Pain  Goal: *Control of Pain  1/30/2020 2316 by Alva Stacy RN  Outcome: Progressing Towards Goal  1/30/2020 2315 by Taj Ann RN  Outcome: Progressing Towards Goal     Problem: Patient Education: Go to Patient Education Activity  Goal: Patient/Family Education  1/30/2020 2316 by Taj Ann RN  Outcome: Progressing Towards Goal  1/30/2020 2315 by Taj Ann RN  Outcome: Progressing Towards Goal

## 2020-01-31 NOTE — PROGRESS NOTES
Dysphagia   LTG: Patient will tolerate least restrictive diet without overt signs or symptoms of airway compromise. STG: Patient will tolerate mechanical soft diet(ground meats/chopped vegetables) and thin liquids by cup without overt signs or symptoms of airway compromise. STG: Patient will demonstrate use of compensatory strategies to improve swallow safety/function with 90% accuracy given minimal cueing  STG: Patient participate in exercises to improve oral motor movement with 80% accuracy given minimal cueing. Progressing 1/31/2020  STG: Patient will participate in modified barium swallow study as clinically indicated. MET 1/8/2020    Neurolinguistic Goals:  LTG: Patient will increase neuro-linguistic abilities to increase safety and awareness in functional living environment   STG: Patient will participate in speech/language/cognitive evaluation. MET 12/17/19  STG: Patient will solve mathematical problems with 80%accuracy given minimal cueing   STG: Patient will name 10 items to concrete category in 1 minute given minimal cueing. Progressing 1/31/2020  STG: Patient will participate in verbal reasoning tasks with 80% accuracy given minimal cueing  STG: Patient will complete mental manipulation tasks with 80% accuracy given minimal cueing  STG: Patient will complete working memory/attention tasks with 80% accuracy given minimal cueing  STG: Patient will recall relevant verbal information with 80% accuracy with minimal assistance  STG: Patient will utilize memory compensatory strategies to improve recall of functional list/message with 90%accuracy given minimal assistance  STG: Patient will participate in ongoing cognitive linguistic evaluation for therapeutic assessment.  MET 1/31/2020    Dysarthria Goals:  LTG: Patient will develop functional and intelligible speech and utilize compensatory strategies to improve communication across environments  STG: Patient will fill in carrier phrase/complete automatic speech tasks with 95% accuracy given minimal cueing. MET 2020  STG: Patient will repeat phrases, sentences with 85% accuracy given minimal cueing. MET 2020  STG: Patient will utilize compensatory strategies across tasks with 90% accuracy given minimal cueing. MET 2020  STG: Patient will utilize compensatory strategies at conversation level with 90% accuracy independently. Added 2020    SPEECH LANGUAGE PATHOLOGY: DYSPHAGIA and SPEECH/LANGUAGE- Daily Note and Progress Report 10    NAME/AGE/GENDER: Emmanuel Lloyd is a 53 y.o. male  DATE: 2020  PRIMARY DIAGNOSIS: Acute ischemic stroke [facility]) [I63.9]  Cerebrovascular accident (CVA) due to embolism ([facility] [address] [I63.9]      ICD-10: Treatment Diagnosis: R13.12 Dysphagia, Oropharyngeal Phase    INTERDISCIPLINARY COLLABORATION: Registered Nurse  PRECAUTIONS/ALLERGIES: Patient has no known allergies. SUBJECTIVE   Reclined in bed, just finished lunch. Orientation:   Person  Place  Time  Situation    Pain: Pain Scale 1: Numeric (0 - 10)  Pain Intensity 1: 0     OBJECTIVE   Swallowing: Not formally addressed as patient just ate lunch. Observed with thin liquids by cup without overt s/sx airway compromise. Will follow up next session. Speech/cognitive linguistic treatment:   Participated in terri cognitive assessment   Watson Cognitive Assessment completed with a raw score of 16/30 (normal 26/30).  Specific results are as follows:    - Visuospatial/Executive function: 3/5   - Namin/3    - Memory:immediate: 4/5 across 3x                -delayed-0/5 independently, 1/5 with category cue, 2/5 with multiple choice   - Attention: 3/6; severely impaired basic calculations    - Language 1/3                    -divergent naming impaired named 3 items to abstract category    - Abstraction /   - Orientation:     Speech intelligibility: hyponasality, dysarthria, imprecise articulatory precision with blended word boundaries      ASSESSMENT Swallowing: Not addressed formally, however patient has been tolerating mechanical soft diet and thin liquids by cup. Reviewed recommendations. Biggest factor has been timeliness of swallow. Will benefit from formally addressing next session. Continue current diet: mechanical soft diet/thin liquids by cup only. No straws. Speech/Cognitive Function: patient continues to demonstrate moderate dysarthria with hyponasality, blended word boundaries, and imprecise articulatory precision, however he continues to demonstrate progress in carryover of compensatory strategies across tasks. Slow,steady gains in cognitive linguistic abilities, however ongoing deficits in attention, working memory, short term memory, and higher level naming/word finding including divergent/convergent naming. Recommend ongoing skilled intervention for dysphagia management, dysarthria, and cognitive linguistic function during this hospitalization and at next level of care as patient is currently functioning significantly below baseline. Tool Used: MODIFIED BRITT SCALE (mRS)    Score   No Symptoms  []? 0   No significant disability despite symptoms; able to carry out all usual duties and activities  []? 1   Slight disability; unable to carry out all previous activities but able to look after own affairs without assistance. []? 2   Moderate disability; requiring some help but able to walk without assistance  []? 3   Moderately severe disability; unable to walk without assistance and unable to attend to own bodily needs without assistance  []? 4   Severe disability; bedridden, incontinent, and requiring constant nursing care and attention  []? 5      Score:  Initial: 2 Most Recent: 2     Interpretation of Tool: The Modified [place] Scale is a 7-point scaled used to quantify level of disability as it relates to a patient's functional abilities.      Tool Used: Dysphagia Outcome and Severity Scale (ROCHELLE)    Score Comments   Normal Diet  [] 7 With no strategies or extra time needed   Functional Swallow  [] 6 May have mild oral or pharyngeal delay   Mild Dysphagia  [] 5 Which may require one diet consistency restricted    Mild-Moderate Dysphagia  [] 4 With 1-2 diet consistencies restricted   Moderate Dysphagia  [] 3 With 2 or more diet consistencies restricted   Moderate-Severe Dysphagia  [] 2 With partial PO strategies (trials with ST only)   Severe Dysphagia  [] 1 With inability to tolerate any PO safely      Score:  Initial:4 Most Recent: 4 (Date 01/31/20 )   Interpretation of Tool: The Dysphagia Outcome and Severity Scale (ROCHELLE) is a simple, easy-to-use, 7-point scale developed to systematically rate the functional severity of dysphagia based on objective assessment and make recommendations for diet level, independence level, and type of nutrition. PLAN    FREQUENCY/DURATION: Continue to follow patient 3 times a week for duration of hospital stay to address above goals. - Recommendations for next treatment session: Next treatment will address dysarthria, dysphagia, cognition     REHABILITATION POTENTIAL FOR STATED GOALS: Good     COMPLIANCE WITH PROGRAM/EXERCISES: Will assess as treatment progresses    CONTINUATION OF SKILLED SERVICES/MEDICAL NECESSITY:   Patient is expected to demonstrate progress in  swallow strength, swallow timeliness, swallow function, diet tolerance and swallow safety in order to  improve swallow safety, work toward diet advancement and decrease aspiration risk.    Patient continues to require skilled intervention for dysphagia management, cognitive linguistic treatment, and dysarthria          RECOMMENDATIONS   DIET:    PO:  Mechanical soft with ground meat/chopped vegatables   Liquids:  regular thin by cup only    MEDICATIONS: Crushed in puree     ASPIRATION PRECAUTIONS  · Slow rate of intake  · Small bites/sips  · Upright at 90 degrees during meal     COMPENSATORY STRATEGIES/MODIFICATIONS  · Alternate liquids/solids  · Small sips and bites  · Slow rate  · NO STRAWS   EDUCATION:  · Patient  · Wife       RECOMMENDATIONS for CONTINUED SPEECH THERAPY: YES: Anticipate need for ongoing speech therapy during this hospitalization and at next level of care.          SAFETY:  After treatment position/precautions:  · Upright in bed  · wife at bedside  · Call light within reach    Total Treatment Duration:   Time In: 0285  Time Out: [address] 69, [facility], CCC-SLP

## 2020-02-01 LAB — GLUCOSE BLD STRIP.AUTO-MCNC: 84 MG/DL (ref 65–100)

## 2020-02-01 PROCEDURE — 74011250637 HC RX REV CODE- 250/637: Performed by: HOSPITALIST

## 2020-02-01 PROCEDURE — 65270000029 HC RM PRIVATE

## 2020-02-01 PROCEDURE — 74011250637 HC RX REV CODE- 250/637: Performed by: INTERNAL MEDICINE

## 2020-02-01 PROCEDURE — 74011250636 HC RX REV CODE- 250/636: Performed by: INTERNAL MEDICINE

## 2020-02-01 PROCEDURE — 82962 GLUCOSE BLOOD TEST: CPT

## 2020-02-01 RX ADMIN — ACETAMINOPHEN 650 MG: 325 TABLET, FILM COATED ORAL at 15:11

## 2020-02-01 RX ADMIN — METFORMIN HYDROCHLORIDE 500 MG: 500 TABLET, FILM COATED ORAL at 08:00

## 2020-02-01 RX ADMIN — LISINOPRIL 40 MG: 20 TABLET ORAL at 09:15

## 2020-02-01 RX ADMIN — ACETAMINOPHEN 650 MG: 325 TABLET, FILM COATED ORAL at 06:30

## 2020-02-01 RX ADMIN — ATORVASTATIN CALCIUM 80 MG: 80 TABLET, FILM COATED ORAL at 21:29

## 2020-02-01 RX ADMIN — GUAIFENESIN 100 MG: 100 SOLUTION ORAL at 18:14

## 2020-02-01 RX ADMIN — METFORMIN HYDROCHLORIDE 500 MG: 500 TABLET, FILM COATED ORAL at 18:03

## 2020-02-01 RX ADMIN — Medication 5 ML: at 06:30

## 2020-02-01 RX ADMIN — ENOXAPARIN SODIUM 40 MG: 40 INJECTION SUBCUTANEOUS at 15:11

## 2020-02-01 RX ADMIN — METOPROLOL SUCCINATE 25 MG: 25 TABLET, FILM COATED, EXTENDED RELEASE ORAL at 09:15

## 2020-02-01 RX ADMIN — ASPIRIN 81 MG 81 MG: 81 TABLET ORAL at 09:15

## 2020-02-01 NOTE — PROGRESS NOTES
Hospitalist Progress Note     Admit Date:  2019  9:07 AM   Name:  Sandra Allen   Age:  53 y.o.  :  1975   MRN:  [id number]   PCP:  Manuel Pitts MD  Treatment Team: Attending Provider: Paz Patterson MD; Care Manager: Cecilio Santiago, RN; Care Manager: Rebecca Valdez LMSW; Consulting Provider: Jonnie Draper MD; Utilization Review: Chema Law RN; Nurse Practitioner: Perry Espinosa NP; Charge Nurse: Tracy Precise    Subjective: This is a 52 yo male with PMH of DM II, HTN who presented 19 with c/o left leg and arm weakness, left facial droop and dysarthria. Code S called. MRI with acute infarctions in left cerebellar hemisphere, deep frontoparietal white matter and right paramedian yariel. Also noted to have old lacunar infarcts. CTA without large vessel occlusions, however some stenosis noted. Echo showed PFO, Cardiology to f/u outpatient for evaluation for closure. Neurology consulted. Started on ASA, statin. Pt is uninsured, difficult placement, case management working on plan. 2 patient seen examined at bedside. He has no complaints at this time. He denies any left shoulder pain. no overnight events. Nursing notes and chart reviewed. Review of Systems negative with exception of pertinent positives noted above. Objective:     Patient Vitals for the past 24 hrs:   Temp Pulse Resp BP SpO2   20 0800 98.2 °F (36.8 °C) 65 18 134/85 95 %   20 0400 97.9 °F (36.6 °C) 70 18 129/87 96 %   20 0000 98.3 °F (36.8 °C) 66 17 123/74 96 %   20 2000 98.4 °F (36.9 °C) (!) 56 15 117/76 98 %   20 1600 97.9 °F (36.6 °C) (!) 18 18 131/80 100 %   20 1200 98.1 °F (36.7 °C) 66 18 121/78 99 %     Oxygen Therapy  O2 Sat (%): 95 % (20 0800)  Pulse via Oximetry: 66 beats per minute (20 0000)  O2 Device: Room air (20 0000)  No intake or output data in the 24 hours ending 20 0966      General:    Well nourished. Alert. Left facial droop  CV:   RRR. No murmur, rub, or gallop. Lungs:   CTAB. No wheezing, rhonchi, or rales. Abdomen:   Soft, nontender, nondistended. Extremities: Warm and dry. No cyanosis or edema. LUE in a sling. LUE able to move fingers. +2/4 radial pulse. LLE unable to wiggle toes. +2/4 DP pulse. Skin:     No rashes or jaundice. Data Review:  I have reviewed all labs, meds, telemetry events, and studies from the last 24 hours. Recent Results (from the past 24 hour(s))   GLUCOSE, POC    Collection Time: 02/01/20  6:58 AM   Result Value Ref Range    Glucose (POC) 84 65 - 100 mg/dL        All Micro Results     None          Current Meds:  Current Facility-Administered Medications   Medication Dose Route Frequency    acetaminophen (TYLENOL) tablet 650 mg  650 mg Oral Q6H    metFORMIN (GLUCOPHAGE) tablet 500 mg  500 mg Oral BID WITH MEALS    metoprolol succinate (TOPROL-XL) XL tablet 25 mg  25 mg Oral DAILY    polyethylene glycol (MIRALAX) packet 17 g  17 g Oral DAILY PRN    guaiFENesin (ROBITUSSIN) 100 mg/5 mL oral liquid 100 mg  100 mg Oral Q4H PRN    hydrALAZINE (APRESOLINE) 20 mg/mL injection 20 mg  20 mg IntraVENous Q4H PRN    atorvastatin (LIPITOR) tablet 80 mg  80 mg Oral QHS    lisinopril (PRINIVIL, ZESTRIL) tablet 40 mg  40 mg Oral DAILY    sodium chloride (NS) flush 5-40 mL  5-40 mL IntraVENous PRN    ondansetron (ZOFRAN) injection 4 mg  4 mg IntraVENous Q4H PRN    aspirin chewable tablet 81 mg  81 mg Oral DAILY    enoxaparin (LOVENOX) injection 40 mg  40 mg SubCUTAneous Q24H    dextrose 40% (GLUTOSE) oral gel 1 Tube  15 g Oral PRN    glucagon (GLUCAGEN) injection 1 mg  1 mg IntraMUSCular PRN    dextrose (D50W) injection syrg 12.5-25 g  25-50 mL IntraVENous PRN       Other Studies (last 24 hours):  No results found.     Assessment and Plan:     Hospital Problems as of 2/1/2020 Date Reviewed: 3/3/2017          Codes Class Noted - Resolved POA    PFO (patent foramen ovale) ICD-10-CM: Q21.1  ICD-9-CM: 745.5  12/17/2019 - Present Yes        Arrhythmia ICD-10-CM: I49.9  ICD-9-CM: 427.9  12/15/2019 - Present Yes        Hyperlipemia ICD-10-CM: E78.5  ICD-9-CM: 272.4  12/15/2019 - Present Yes        * (Principal) Acute embolic stroke [facility]) [id number]-51-YP: I63.9  ICD-9-CM: 434.11  12/12/2019 - Present Yes        Hypertension (Chronic) ICD-10-CM: I10  ICD-9-CM: 401.9  Unknown - Present Yes        Type 2 diabetes mellitus (HCC) (Chronic) ICD-10-CM: E11.9  ICD-9-CM: 250.00  Unknown - Present Yes              PLAN:    Left shoulder pain, improving  -Tylenol every 6 hours scheduled as patient is frequently asking for it for left shoulder pain.       Acute embolic stroke: POA  -Dysphagia   -MRI head: with acute infarcts in L cerebellar hemisphere, deep frontoparietal white matter, and R paramedian yariel.  Also noted old lacunar infarcts. -ISMAEL: 3 mm PFO, needs to f/u outpatient with Cardiology for evaluation for closure   -statin, ASA  -PT/OT/Speech      Hypertension: continue home meds      Type 2 diabetes mellitus:  Metformin     Arrhythmia: had brief wide complex tachycardia early in admission, will need event monitor as outpt per cards     DVT Prophylaxis: lovenox     DISPO: Difficult placement, case management working on plan. Patient has no medical insurance. Pt can't go home as pt's wife is disabled and is not safe at home without supervision as pt needs long term care.       Signed:  Boy Vaz MD

## 2020-02-01 NOTE — PROGRESS NOTES
02/01/20 0655   NIH Stroke Scale   Interval Other (comment)  (Dual shift change [facility]. )   LOC 0   LOC Questions 0   LOC Commands 0   Best Gaze 0   Visual 0   Facial Palsy 1   Motor Right Arm 0   Motor Left Arm 2   Motor Right Leg 0   Motor Left Leg 2   Limb Ataxia 0   Sensory 0   Best Language 1   Dysarthria 1   Extinction and Inattention 0   Total 7     Dual shift change [facility] with Reginald Oropeza RN.

## 2020-02-02 LAB — GLUCOSE BLD STRIP.AUTO-MCNC: 96 MG/DL (ref 65–100)

## 2020-02-02 PROCEDURE — 97530 THERAPEUTIC ACTIVITIES: CPT

## 2020-02-02 PROCEDURE — 74011250636 HC RX REV CODE- 250/636: Performed by: INTERNAL MEDICINE

## 2020-02-02 PROCEDURE — 74011250637 HC RX REV CODE- 250/637: Performed by: INTERNAL MEDICINE

## 2020-02-02 PROCEDURE — 97535 SELF CARE MNGMENT TRAINING: CPT

## 2020-02-02 PROCEDURE — 97112 NEUROMUSCULAR REEDUCATION: CPT

## 2020-02-02 PROCEDURE — 74011250637 HC RX REV CODE- 250/637: Performed by: HOSPITALIST

## 2020-02-02 PROCEDURE — 65270000029 HC RM PRIVATE

## 2020-02-02 PROCEDURE — 82962 GLUCOSE BLOOD TEST: CPT

## 2020-02-02 RX ADMIN — LISINOPRIL 40 MG: 20 TABLET ORAL at 08:45

## 2020-02-02 RX ADMIN — ACETAMINOPHEN 650 MG: 325 TABLET, FILM COATED ORAL at 17:13

## 2020-02-02 RX ADMIN — METFORMIN HYDROCHLORIDE 500 MG: 500 TABLET, FILM COATED ORAL at 16:44

## 2020-02-02 RX ADMIN — GUAIFENESIN 100 MG: 100 SOLUTION ORAL at 20:37

## 2020-02-02 RX ADMIN — METFORMIN HYDROCHLORIDE 500 MG: 500 TABLET, FILM COATED ORAL at 07:41

## 2020-02-02 RX ADMIN — ACETAMINOPHEN 650 MG: 325 TABLET, FILM COATED ORAL at 06:27

## 2020-02-02 RX ADMIN — ENOXAPARIN SODIUM 40 MG: 40 INJECTION SUBCUTANEOUS at 16:44

## 2020-02-02 RX ADMIN — GUAIFENESIN 100 MG: 100 SOLUTION ORAL at 08:44

## 2020-02-02 RX ADMIN — ACETAMINOPHEN 650 MG: 325 TABLET, FILM COATED ORAL at 12:26

## 2020-02-02 RX ADMIN — METOPROLOL SUCCINATE 25 MG: 25 TABLET, FILM COATED, EXTENDED RELEASE ORAL at 08:45

## 2020-02-02 RX ADMIN — ATORVASTATIN CALCIUM 80 MG: 80 TABLET, FILM COATED ORAL at 20:33

## 2020-02-02 RX ADMIN — ASPIRIN 81 MG 81 MG: 81 TABLET ORAL at 08:48

## 2020-02-02 RX ADMIN — ACETAMINOPHEN 650 MG: 325 TABLET, FILM COATED ORAL at 23:08

## 2020-02-02 NOTE — PROGRESS NOTES
Received patient awake, alert and oriented in bed watching tv. Assisted patient to reposition, sling in place. Patient has left sided weakness. Nursing assessment completed. Bed in low and locked position. Instructed patient to call for assistance, if needed.

## 2020-02-02 NOTE — PROGRESS NOTES
02/02/20 1978   NIH Stroke Scale   Interval Other (comment)   LOC 0   LOC Questions 0   LOC Commands 0   Best Gaze 0   Visual 0   Facial Palsy 1   Motor Right Arm 0   Motor Left Arm 2   Motor Right Leg 0   Motor Left Leg 2   Limb Ataxia 0   Sensory 0   Best Language 1   Dysarthria 1   Extinction and Inattention 0   Total 7

## 2020-02-02 NOTE — PROGRESS NOTES
02/01/20 1919   NIH Stroke Scale   Interval Other (comment)  (dual nih with ALEJANDRA RN)   LOC 0   LOC Questions 0   LOC Commands 0   Best Gaze 0   Visual 0   Facial Palsy 1   Motor Right Arm 0   Motor Left Arm 2   Motor Right Leg 0   Motor Left Leg 2   Limb Ataxia 0   Sensory 0   Best Language 1   Dysarthria 1   Extinction and Inattention 0   Total 7   NIH with Alejandra RN

## 2020-02-02 NOTE — PROGRESS NOTES
Problem: Self Care Deficits Care Plan (Adult)  Goal: *Acute Goals and Plan of Care (Insert Text)  Description  GOALS UPDATED 1/22/2020:   1. Patient will complete dynamic sitting balance for ADLs with supervision. (Progressing)  2. Patient will demonstrate appropriate safety awareness and protection of L UE during bed mobility and functional transfers with minimal cues. (Progressing)  3. Patient will complete total body bathing and dressing with moderate assistance and adaptive equipment as needed. (Progressing)  4. Patient will complete weightbearing into the L UE with ADL tasks with minimal assistance to improve ability to use as a functional assist during ADL tasks. (Progressing)  5. Patient will demonstrate modified independence with therapeutic exercises to improve strength in L UE for ADLs/functional transfers. 6. Patient will complete bed mobility with stand by assistance and adaptive equipment as needed in preparation for functional transfers. 7. Patient will complete functional transfers with contact guard assistance and adaptive equipment as needed. (Progressing)  8. Patient will complete functional mobility for ADLs with moderate assistance and adaptive equipment as needed.  (Goal Met)    Timeframe: 7 visits       Outcome: Progressing Towards Goal     OCCUPATIONAL THERAPY: Daily Note and AM    2/2/2020  INPATIENT: OT Visit Days: 6  Payor: MEDICAID PENDING / Plan: [employer] PENDING / Product Type: Medicaid /      NAME/AGE/GENDER: Samantha Talavera is a 53 y.o. male   PRIMARY DIAGNOSIS:  Acute ischemic stroke ([facility] [I63.9]  Cerebrovascular accident (CVA) due to embolism ([facility] [address] [P54.1] Acute embolic stroke ([facility] [address] [facility] Acute embolic stroke ([facility] [address]       ICD-10: Treatment Diagnosis:    · Generalized Muscle Weakness (M62.81)  · Other lack of cordination (R27.8)  · Hemiplegia and hemiparesis following cerebral infarction affecting   · left non-dominant side (I69.354)  · Abnormal posture (R29.3) Precautions/Allergies:    NO PULLING ON LUE   LUE in sling with shoulder joint approximated and supported   Patient has no known allergies. ASSESSMENT:     Mr. Patty Brown is a 53 y.o. male presents to the hospital with L sided weakness and acute ischemic CVA. MRI revealed acute to subacute L cerebellar and R paramedian yariel infarcts. At baseline pt lives with his wife and is independent. One finger wide subluxation noted in L shoulder (1/22/2020). 2/2/2020  Pt presents in supine upon arrival. Pt transferred to sitting with mod a and additional time. Pt asked therapist to remove KT and stated that (Kenn Blue; OTR/L) requested it to be removed today. Pt had no irritation from it and it was still intact before removal. Pt completed lateral weight bearing through his RUE and tolerated ROM to illicit shoulder elevation and shoulder protraction/retraction. Some muscle activation was present with shrugs. No other movement seen. Pt participated in hygiene, bathing, and dressing with mod a. Pt's sling was readjusted and pt completed sit to stand with mod a and a luke walker and completed side steps along side of bed with additional time and cues. Pt was assisted back to supine with mod a and bed was placed in chair mode. Great effort with mobility today. Continue POC. This section established at most recent assessment   PROBLEM LIST (Impairments causing functional limitations):  1. Decreased Strength  2. Decreased ADL/Functional Activities  3. Decreased Transfer Abilities  4. Decreased Ambulation Ability/Technique  5. Decreased Balance  6. Decreased Activity Tolerance  7. Increased Fatigue  8. Decreased Flexibility/Joint Mobility  9. Decreased Knowledge of Precautions  10. Decreased [place] with Home Exercise Program   INTERVENTIONS PLANNED: (Benefits and precautions of occupational therapy have been discussed with the patient.)  1. Activities of daily living training  2. Adaptive equipment training  3.  Balance training  4. Clothing management  5. Cognitive training  6. Donning&doffing training  7. Keanu tech training  8. Neuromuscular re-eduation  9. Therapeutic activity  10. Therapeutic exercise     TREATMENT PLAN: Frequency/Duration: Follow patient 3 times per week to address above goals. Rehabilitation Potential For Stated Goals: Excellent     REHAB RECOMMENDATIONS (at time of discharge pending progress):    Placement: It is my opinion, based on this patient's performance to date, that Mr. Anjel Gil may benefit from intensive therapy at an [address] [place] [address] after discharge due to potential to make ongoing and sustainable functional improvement that is of practical value. Darren Hang Pt functioning far below independent baseline, demonstrating good improvement and participation. Pt would likely benefit greatly and increase independence from inpatient rehab stay. Equipment:    TBD               OCCUPATIONAL PROFILE AND HISTORY:   History of Present Injury/Illness (Reason for Referral):  See H&P  Past Medical History/Comorbidities:   Mr. Anjel Gil  has a past medical history of Acute ischemic stroke ([address] (12/12/2019), Acute pancreatitis (11/19/2014), Cerebrovascular accident (CVA) due to embolism ([address] (12/12/2019), Diabetes ([address] (2002), Diabetes ([address] Diabetes mellitus, and Hypertension. He also has no past medical history of Arthritis, Asthma, Autoimmune disease ([address] CAD (coronary artery disease), Cancer ([address] Chronic kidney disease, COPD, Dementia, Dementia ([address] Heart failure ([address] Ill-defined condition, Infectious disease, Liver disease, Other ill-defined conditions(799.89), Psychiatric disorder, PUD (peptic ulcer disease), Seizures ([address] or Sleep disorder. Mr. Anjel Gil  has a past surgical history that includes hx hernia repair and hx orthopaedic.   Social History/Living Environment:   Home Environment: Apartment  # Steps to Enter: 12  Rails to Enter: Yes  [employer] : Bilateral  One/Two Story Residence: One story  Living Alone: No  Support Systems: Spouse/Significant Other/Partner  Patient Expects to be Discharged to[de-identified] Unknown  Current DME Used/Available at Home: None  Tub or Shower Type: Tub/Shower combination  Prior Level of Function/Work/Activity:  Pt lives at home with his wife. Pt is typically independent with ADL/functional mobility. Pt does not drive. Pt was working part-time at [employer]. Personal Factors:          Age:  53 y.o. Past/Current Experience:  CVA with flaccid L side        Other factors that influence how disability is experienced by the patient:  multiple co-morbidities    Number of Personal Factors/Comorbidities that affect the Plan of Care: Extensive review of physical, cognitive, and psychosocial performance (3+):  HIGH COMPLEXITY   ASSESSMENT OF OCCUPATIONAL PERFORMANCE[de-identified]   Activities of Daily Living:   Basic ADLs (From Assessment) Complex ADLs (From Assessment)   Feeding: Setup  Oral Facial Hygiene/Grooming: Moderate assistance  Bathing: Maximum assistance  Upper Body Dressing: Maximum assistance  Lower Body Dressing: Total assistance  Toileting: Maximum assistance Instrumental ADL  Meal Preparation: Total assistance  Homemaking: Total assistance   Grooming/Bathing/Dressing Activities of Daily Living   Grooming  Washing Face: Set-up  Washing Hands: Moderate assistance  Brushing Teeth: Set-up     Upper Body Bathing  Bathing Assistance: Moderate assistance  Position Performed: Seated edge of bed     Lower Body Bathing  Lower Body : Maximum assistance  Position Performed: Seated edge of bed     Upper Body [address]: Moderate assistance       Bed/Mat Mobility  Supine to Sit: Moderate assistance  Sit to Supine: Moderate assistance  Sit to Stand:  Moderate assistance  Stand to Sit: Moderate assistance     Most Recent Physical Functioning:   Gross Assessment:                  Posture:     Balance:  Sitting: Impaired  Sitting - Static: Fair (occasional); Good (unsupported)  Sitting - Dynamic: Fair (occasional)  Standing: Impaired  Standing - Static: Poor  Standing - Dynamic : Poor Bed Mobility:  Supine to Sit: Moderate assistance  Sit to Supine: Moderate assistance  Wheelchair Mobility:     Transfers:  Sit to Stand: Moderate assistance  Stand to Sit: Moderate assistance            Patient Vitals for the past 6 hrs:   BP BP Patient Position SpO2 Pulse   20 0800 121/79 Supine 94 % 63   20 1200 118/77 Supine 99 % 67       Mental Status  Neurologic State: Alert  Orientation Level: Oriented X4  Cognition: Poor safety awareness  Perception: Cues to maintain midline in standing  Perseveration: No perseveration noted  Safety/Judgement: Awareness of environment, Fall prevention, Insight into deficits                          Physical Skills Involved:  1. Range of Motion  2. Balance  3. Strength  4. Activity Tolerance  5. Fine Motor Control  6. Gross Motor Control Cognitive Skills Affected (resulting in the inability to perform in a timely and safe manner):  1. Perception  2. Expression Psychosocial Skills Affected:  1. Habits/Routines  2. Environmental Adaptation  3. Social Interaction  4. Emotional Regulation  5. Self-Awareness  6. Awareness of Others  7. Social Roles   Number of elements that affect the Plan of Care: 5+:  HIGH COMPLEXITY   CLINICAL DECISION MAKIN [facility] [address] [address] AM-PAC 6 Clicks   Daily Activity Inpatient Short Form  How much help from another person does the patient currently need. .. Total A Lot A Little None   1. Putting on and taking off regular lower body clothing? [x] 1   [] 2   [] 3   [] 4   2. Bathing (including washing, rinsing, drying)? [] 1   [x] 2   [] 3   [] 4   3. Toileting, which includes using toilet, bedpan or urinal?   [] 1   [x] 2   [] 3   [] 4   4. Putting on and taking off regular upper body clothing? [] 1   [x] 2   [] 3   [] 4   5. Taking care of personal grooming such as brushing teeth?    [] 1   [x] 2   [] 3   [] 4   6. Eating meals? [] 1   [] 2   [x] 3   [] 4   © 2007, Trustees of [address] [place] [address] [address] [address], under license to [employer]. All rights reserved      Score:  Initial: 11 Most Recent: 12 (Date: 1/22/2020 )    Interpretation of Tool:  Represents activities that are increasingly more difficult (i.e. Bed mobility, Transfers, Gait). Medical Necessity:     · Patient demonstrates   · good and excellent  ·  rehab potential due to higher previous functional level. Reason for Services/Other Comments:  · Patient continues to require skilled intervention due to   · Decreased independence with ADL/functional transfers that impacts overall quality of life. · .   Use of outcome tool(s) and clinical judgement create a POC that gives a: MODERATE COMPLEXITY         TREATMENT:   (In addition to Assessment/Re-Assessment sessions the following treatments were rendered)     Pre-treatment Symptoms/Complaints:    Pain: Initial:   Pain Intensity 1: 0  Pain Location 1: Shoulder  Pain Orientation 1: Left  Pain Intervention(s) 1: Exercise, Repositioned  Post Session:  0     Therapeutic Activity: (15 minutes): Therapeutic activities including Bed transfers, edge of bed sitting, sit to stand and static/dynamic standing to improve mobility, strength and balance. Required moderate   to promote static and dynamic balance in standing. Self Care: (15 minutes): Procedure(s) (per grid) utilized to improve and/or restore self-care/home management as related to dressing and bathing. Required moderate visual, verbal, manual and   cueing to facilitate activities of daily living skills and compensatory activities. Neuromuscular Re-education: (12 minutes):  Exercise/activities per grid below to improve balance, coordination, kinesthetic sense and posture. Required moderate manual cues to promote static and dynamic balance in standing.       LLE Re-Education  Other Activities: lateral weight bearing through his L UE to increase biofeedback and proprioceptionTrunk Re-Education  Muscle Facilitation: facilitation to increase balance in standing and weight shifting                           Date:  1/27/2020 Date:   Date:     Activity/Exercise Parameters Parameters Parameters   LUE finger flexion, AROM gravity minimized then AAROM to complete ROM 2x10 reps     LUE finger extension AAROM 2x10 reps     LUE wrist extension, AROM gravity minimized then AAROM to complete ROM 1x10 reps     LUE wrist extension, AROM against gravity  1x10 reps     LUE wrist flexion AAROM 2x10 reps     LUE elbow flexion/extension PROM x10 reps     LUE shoulder flexion PROM x10 reps                      Braces/Orthotics/Lines/Etc:   · LUE sling   · O2 device: Room air  · Treatment/Session Assessment:    · Response to Treatment:  Pt demonstrated good participation. · Interdisciplinary Collaboration:   o Certified Occupational Therapy Assistant  o Registered Nurse  · After treatment position/precautions:   o Supine in bed  o Bed alarm/tab alert on  o Bed/Chair-wheels locked  o Call light within reach  o RN notified  o Side rails x 3   · Compliance with Program/Exercises: Compliant all of the time, Will assess as treatment progresses. · Recommendations/Intent for next treatment session: \"Next visit will focus on advancements to more challenging activities and reduction in assistance provided\".   Total Treatment Duration:  OT Patient Time In/Time Out  Time In: 1023  Time Out: [address]

## 2020-02-02 NOTE — PROGRESS NOTES
02/02/20 5249   NIH Stroke Scale   Interval Other (comment)   LOC 0   LOC Questions 0   LOC Commands 0   Best Gaze 0   Visual 0   Facial Palsy 1   Motor Right Arm 0   Motor Left Arm 2   Motor Right Leg 0   Motor Left Leg 2   Limb Ataxia 0   Sensory 0   Best Language 1   Dysarthria 1   Extinction and Inattention 0   Total 7

## 2020-02-02 NOTE — PROGRESS NOTES
Hospitalist Progress Note     Admit Date:  2019  9:07 AM   Name:  Cristina Enriquez   Age:  53 y.o.  :  1975   MRN:  [id number]   PCP:  Benjamin Montenegro MD  Treatment Team: Attending Provider: Gómez Carlos MD; Care Manager: Alvarado Bell RN; Care Manager: Diaz Burnett LMSW; Consulting Provider: Blanca Conroy MD; Utilization Review: Linda Prieto RN; Nurse Practitioner: Ailyn Rubio NP; Charge Nurse: Cammie Stratton; Occupational Therapy Assistant: Rosaura Kayser    Subjective: This is a 52 yo male with PMH of DM II, HTN who presented 19 with c/o left leg and arm weakness, left facial droop and dysarthria.  Code S called. MRI with acute infarctions in left cerebellar hemisphere, deep frontoparietal white matter and right paramedian yariel.  Also noted to have old lacunar infarcts. CTA without large vessel occlusions, however some stenosis noted.  Echo showed PFO, Cardiology to f/u outpatient for evaluation for closure.  Neurology consulted.  Started on ASA, statin.  Pt is uninsured, difficult placement, case management working on plan.         2/ patient seen examined at bedside. No overnight events. He has no complaints. He denies any shoulder pain. Nursing notes and chart reviewed. Review of Systems negative with exception of pertinent positives noted above.       Objective:     Patient Vitals for the past 24 hrs:   Temp Pulse Resp BP SpO2   20 0800 98.2 °F (36.8 °C) 63 18 121/79 94 %   20 0400 97.9 °F (36.6 °C) 71 18 120/82 92 %   20 0000 98.5 °F (36.9 °C) 66 18 (!) 145/93 95 %   20 2000 97.5 °F (36.4 °C) (!) 56 18 134/84 95 %   20 1600 98.1 °F (36.7 °C) 69 18 112/71 93 %   20 1200 98.2 °F (36.8 °C) 87 18 130/84 92 %     Oxygen Therapy  O2 Sat (%): 94 % (20 0800)  Pulse via Oximetry: 66 beats per minute (20)  O2 Device: Room air (20)    Intake/Output Summary (Last 24 hours) at 2020 1716  Last data filed at 2/2/2020 0402  Gross per 24 hour   Intake    Output 350 ml   Net -350 ml         General:    Well nourished. Alert. Left facial droop appreciated   CV:   RRR. No murmur, rub, or gallop. Lungs:   CTAB. No wheezing, rhonchi, or rales. Abdomen:   Soft, nontender, nondistended. Extremities: Warm and dry. No cyanosis or edema. Left upper extremity is in the sling and is able to move his fingers. Left lower extremity unable to wiggle toes. Skin:     No rashes or jaundice. Data Review:  I have reviewed all labs, meds, telemetry events, and studies from the last 24 hours.     Recent Results (from the past 24 hour(s))   GLUCOSE, POC    Collection Time: 02/02/20  7:46 AM   Result Value Ref Range    Glucose (POC) 96 65 - 100 mg/dL        All Micro Results     None          Current Meds:  Current Facility-Administered Medications   Medication Dose Route Frequency    lip protectant (BLISTEX) ointment 1 Each  1 Each Topical PRN    acetaminophen (TYLENOL) tablet 650 mg  650 mg Oral Q6H    metFORMIN (GLUCOPHAGE) tablet 500 mg  500 mg Oral BID WITH MEALS    metoprolol succinate (TOPROL-XL) XL tablet 25 mg  25 mg Oral DAILY    polyethylene glycol (MIRALAX) packet 17 g  17 g Oral DAILY PRN    guaiFENesin (ROBITUSSIN) 100 mg/5 mL oral liquid 100 mg  100 mg Oral Q4H PRN    hydrALAZINE (APRESOLINE) 20 mg/mL injection 20 mg  20 mg IntraVENous Q4H PRN    atorvastatin (LIPITOR) tablet 80 mg  80 mg Oral QHS    lisinopril (PRINIVIL, ZESTRIL) tablet 40 mg  40 mg Oral DAILY    sodium chloride (NS) flush 5-40 mL  5-40 mL IntraVENous PRN    ondansetron (ZOFRAN) injection 4 mg  4 mg IntraVENous Q4H PRN    aspirin chewable tablet 81 mg  81 mg Oral DAILY    enoxaparin (LOVENOX) injection 40 mg  40 mg SubCUTAneous Q24H    dextrose 40% (GLUTOSE) oral gel 1 Tube  15 g Oral PRN    glucagon (GLUCAGEN) injection 1 mg  1 mg IntraMUSCular PRN    dextrose (D50W) injection syrg 12.5-25 g  25-50 mL IntraVENous PRN       Other Studies (last 24 hours):  No results found. Assessment and Plan:     Hospital Problems as of 2/2/2020 Date Reviewed: 3/3/2017          Codes Class Noted - Resolved POA    PFO (patent foramen ovale) ICD-10-CM: Q21.1  ICD-9-CM: 745.5  12/17/2019 - Present Yes        Arrhythmia ICD-10-CM: I49.9  ICD-9-CM: 427.9  12/15/2019 - Present Yes        Hyperlipemia ICD-10-CM: E78.5  ICD-9-CM: 272.4  12/15/2019 - Present Yes        * (Principal) Acute embolic stroke [facility]) HPV-46-FM: I63.9  ICD-9-CM: 434.11  12/12/2019 - Present Yes        Hypertension (Chronic) ICD-10-CM: I10  ICD-9-CM: 401.9  Unknown - Present Yes        Type 2 diabetes mellitus (HCC) (Chronic) ICD-10-CM: E11.9  ICD-9-CM: 250.00  Unknown - Present Yes              PLAN:    Left shoulder pain, resolved (2/2)  -Tylenol every 6 hours scheduled as patient is frequently asking for it for left shoulder pain.        Acute embolic stroke: POA  -Dysphagia   -MRI head: with acute infarcts in L cerebellar hemisphere, deep frontoparietal white matter, and R paramedian yariel.  Also noted old lacunar infarcts. -ISMAEL: 3 mm PFO, needs to f/u outpatient with Cardiology for evaluation for closure   -statin, ASA  -PT/OT/Speech      Hypertension: continue home meds      Type 2 diabetes mellitus:  Metformin     Arrhythmia: had brief wide complex tachycardia early in admission, will need event monitor as outpt per cards     DVT Prophylaxis: lovenox     DISPO: Difficult placement, case management working on plan. Patient has no medical insurance.   Pt can't go home as pt's wife is disabled and is not safe at home without supervision as pt needs long term care.          Signed:  Vanesa Rios MD

## 2020-02-03 LAB — GLUCOSE BLD STRIP.AUTO-MCNC: 85 MG/DL (ref 65–100)

## 2020-02-03 PROCEDURE — 97116 GAIT TRAINING THERAPY: CPT

## 2020-02-03 PROCEDURE — 97112 NEUROMUSCULAR REEDUCATION: CPT

## 2020-02-03 PROCEDURE — 74011250637 HC RX REV CODE- 250/637: Performed by: HOSPITALIST

## 2020-02-03 PROCEDURE — 92507 TX SP LANG VOICE COMM INDIV: CPT

## 2020-02-03 PROCEDURE — 74011250637 HC RX REV CODE- 250/637: Performed by: INTERNAL MEDICINE

## 2020-02-03 PROCEDURE — 65270000029 HC RM PRIVATE

## 2020-02-03 PROCEDURE — 82962 GLUCOSE BLOOD TEST: CPT

## 2020-02-03 PROCEDURE — 92526 ORAL FUNCTION THERAPY: CPT

## 2020-02-03 PROCEDURE — 74011250636 HC RX REV CODE- 250/636: Performed by: INTERNAL MEDICINE

## 2020-02-03 RX ADMIN — ATORVASTATIN CALCIUM 80 MG: 80 TABLET, FILM COATED ORAL at 23:24

## 2020-02-03 RX ADMIN — METFORMIN HYDROCHLORIDE 500 MG: 500 TABLET, FILM COATED ORAL at 18:25

## 2020-02-03 RX ADMIN — ACETAMINOPHEN 650 MG: 325 TABLET, FILM COATED ORAL at 05:05

## 2020-02-03 RX ADMIN — LISINOPRIL 40 MG: 20 TABLET ORAL at 08:25

## 2020-02-03 RX ADMIN — ASPIRIN 81 MG 81 MG: 81 TABLET ORAL at 08:25

## 2020-02-03 RX ADMIN — ENOXAPARIN SODIUM 40 MG: 40 INJECTION SUBCUTANEOUS at 14:33

## 2020-02-03 RX ADMIN — METOPROLOL SUCCINATE 25 MG: 25 TABLET, FILM COATED, EXTENDED RELEASE ORAL at 08:25

## 2020-02-03 RX ADMIN — GUAIFENESIN 100 MG: 100 SOLUTION ORAL at 18:25

## 2020-02-03 RX ADMIN — ACETAMINOPHEN 650 MG: 325 TABLET, FILM COATED ORAL at 12:22

## 2020-02-03 RX ADMIN — ACETAMINOPHEN 650 MG: 325 TABLET, FILM COATED ORAL at 23:24

## 2020-02-03 RX ADMIN — METFORMIN HYDROCHLORIDE 500 MG: 500 TABLET, FILM COATED ORAL at 08:25

## 2020-02-03 RX ADMIN — ACETAMINOPHEN 650 MG: 325 TABLET, FILM COATED ORAL at 18:21

## 2020-02-03 NOTE — PROGRESS NOTES
Problem: Mobility Impaired (Adult and Pediatric)  Goal: *Acute Goals and Plan of Care  Description  Acute PT Goals (reviewed & updated 1/29/2020):  STG:  (1.) Mr. Elvin Farooq will perform bed mobility with MINIMAL ASSISTANCE within 3 treatment day(s). (2.) Mr. Elvin Farooq will demonstrate good dynamic sitting balance within 3 treatment days. (3.) Mr. Elvin Farooq will transfer sit to stand with MINIMAL ASSISTANCE X1 within 3 treatment days. (4.)Mr. Faye Wu will transfer from bed to chair and chair to bed with MODERATE ASSIST x1 using the least restrictive device within 3 treatment day(s). LTG:  (1.) Mr. Elvin Farooq will perform bed mobility with CONTACT GUARD ASSISTANCE within 7 treatment day(s). (2.) Mr. Elvin Farooq will transfer from bed to chair and chair to bed with MINIMAL ASSIST using the least restrictive device within 7 treatment day(s). (3.) Mr. Elvin Farooq will ambulate with MODERATE ASSIST for 5+ feet with the least restrictive device to assist with transfers within 7 treatment day(s). (4.) Mr. Elvin Farooq will perform standing static and dynamic balance activities x 10 minutes with MINIMAL ASSIST to improve safety within 7 treatment day(s). (5.)Mr. Elvin Farooq will demonstrate FAIR DYNAMIC STANDING balance within 7 treatment days. (6.) Mr. Elvin Farooq will tolerate 25+ minutes of neuromuscular re-education and/or therapeutic activity/exercise while maintaining stable vitals within 7 treatment day(s).      PHYSICAL THERAPY: Daily Note and PM 2/3/2020  INPATIENT: PT Visit Days : 4  Payor: MEDICAID PENDING / Plan: [employer] PENDING / Product Type: Medicaid /       NAME/AGE/GENDER: Samantha Talavera is a 53 y.o. male   PRIMARY DIAGNOSIS: Acute ischemic stroke ([address] [I63.9]  Cerebrovascular accident (CVA) due to embolism ([address] [M13.2] Acute embolic stroke ([address] Acute embolic stroke ([address]       ICD-10: Treatment Diagnosis:   · Other lack of cordination (R27.8)  · Difficulty in walking, Not elsewhere classified (R26.2)  · Other abnormalities of gait and mobility (R26.89)  · Unspecified Lack of Coordination (R27.9)  · Hemiplegia and hemiparesis following cerebral infarction affecting   · left non-dominant side (F05.943)   Precaution/Allergies:  Patient has no known allergies. ASSESSMENT:     Mr. Jackie Anderson is a 53year old admitted with acute CVA with left hemiparesis and left inattention. Patient was supine upon contact and agreeable to PT. Patient performs supine to sit with mod assist, additional time, and cues to utilize compensatory strategies to assist with bed mobility. Treatment today consisted of neuro re-education including dynamic sitting balance, reaching outside of base of support, weight shifts in sitting and standing static/dynamic balance including weight shifts with L foot wrapped with ace wrap to assist dorsiflexion. Patient demonstrated poor standing balance with difficulty bearing weight through RLE. Patient then performed gait training x 12' with use of luke walker, mod assist, and below cues in gait section. Manual and tactile cues provided to right quad and knee to improve muscle contraction and knee stability as well as to advance RLE during swing phase. Patient returned to EOB and to supine with mod assist where he was repositioned for comfort. Patient able to perform bridging x 5 reps with good ability. Overall good progress towards physical therapy goals with great potential. Will continue efforts. This section established at most recent assessment   PROBLEM LIST (Impairments causing functional limitations):  1. Decreased Strength  2. Decreased ADL/Functional Activities  3. Decreased Transfer Abilities  4. Decreased Ambulation Ability/Technique  5. Decreased Balance  6. Increased Pain  7. Decreased Activity Tolerance  8. Increased Fatigue  9.  Decreased Flexibility/Joint Mobility   INTERVENTIONS PLANNED: (Benefits and precautions of physical therapy have been discussed with the patient.)  1. Balance Exercise  2. Bed Mobility  3. Family Education  4. Gait Training  5. Home Exercise Program (HEP)  6. Manual Therapy  7. Neuromuscular Re-education/Strengthening  8. Range of Motion (ROM)  9. Therapeutic Activites  10. Therapeutic Exercise/Strengthening  11. Transfer Training     TREATMENT PLAN: Frequency/Duration: 3 times a week for duration of hospital stay  Rehabilitation Potential For Stated Goals: Good     REHAB RECOMMENDATIONS (at time of discharge pending progress):    Placement: It is my opinion, based on this patient's performance to date, that Mr. Anoop Myers may benefit from intensive therapy at an [address] [place] [address] after discharge due to a probable need for 24 hour rehab nursing, a probable need for multiple therapy disciplines, and potential to make ongoing and sustainable functional improvement that is of practical value. .  Equipment:    TBD pending progress with therapy. HISTORY:   History of Present Injury/Illness (Reason for Referral):  Per H&P: \"Pt is a 54 y/o smoker with DM, HTN, who presented to ER with L leg and arm weakness, L facial droop, dysarthria. First noted L leg weakness late night 12/11 when he woke up to go to the bathroom. He was normal when he went to bed around 1030. Woke up this morning and had persistent weakness L leg and also now noted in L arm. EMS called. Noted to have slurred speech and L facial droop as well. Code S called in ER around 9am.  MRI with acute infarcts in L cerebellar hemisphere, deep frontoparietal white matter, and R paramedian yariel. Also noted old lacunar infarcts. No large vessel occlusion on CTA, but some stenosis noted. No hx afib, TIA, CVA. No CP, palpitations, SOB. \"  Past Medical History/Comorbidities:   Mr. Anoop Myers  has a past medical history of Acute ischemic stroke ([facility] (12/12/2019), Acute pancreatitis (11/19/2014), Cerebrovascular accident (CVA) due to embolism ([address] (12/12/2019), Diabetes ([facility] (2002), Diabetes ([facility] Diabetes mellitus, and Hypertension. He also has no past medical history of Arthritis, Asthma, Autoimmune disease ([facility] CAD (coronary artery disease), Cancer ([address] Chronic kidney disease, COPD, Dementia, Dementia ([address] Heart failure ([facility] Ill-defined condition, Infectious disease, Liver disease, Other ill-defined conditions(799.89), Psychiatric disorder, PUD (peptic ulcer disease), Seizures ([facility] or Sleep disorder. Mr. Luc Gómez  has a past surgical history that includes hx hernia repair and hx orthopaedic. Social History/Living Environment:   Home Environment: Apartment  # Steps to Enter: 12  Rails to Enter: Yes  [employer] : Bilateral  One/Two Story Residence: One story  Living Alone: No  Support Systems: Spouse/Significant Other/Partner  Patient Expects to be Discharged to[de-identified] Unknown  Current DME Used/Available at Home: None  Tub or Shower Type: Tub/Shower combination  Prior Level of Function/Work/Activity:  Independent, lives with wife in 2nd story 1 level apartment. No recent falls. Number of Personal Factors/Comorbidities that affect the Plan of Care: 1-2: MODERATE COMPLEXITY   EXAMINATION:   Most Recent Physical Functioning:   Gross Assessment:                  Posture:     Balance:  Sitting: Impaired  Sitting - Static: Fair (occasional)  Sitting - Dynamic: Fair (occasional)  Standing: Impaired  Standing - Static: Poor  Standing - Dynamic : Poor Bed Mobility:  Supine to Sit: Moderate assistance  Sit to Supine: Moderate assistance  Wheelchair Mobility:     Transfers:  Sit to Stand: Moderate assistance  Stand to Sit: Moderate assistance  Gait:     Base of Support: Shift to right;Center of gravity altered  Speed/Clotilde: Delayed; Shuffled; Slow  Step Length: Left shortened;Right shortened  Gait Abnormalities: Hemiplegic  Distance (ft): 12 Feet (ft)  Assistive Device: Walker luke  Ambulation - Level of Assistance: Moderate assistance  Interventions: Tactile cues; Safety awareness training;Manual cues; Verbal cues      Body Structures Involved:  1. Nerves  2. Voice/Speech  3. Bones  4. Joints  5. Muscles Body Functions Affected:  1. Mental  2. Sensory/Pain  3. Neuromusculoskeletal  4. Movement Related Activities and Participation Affected:  1. General Tasks and Demands  2. Mobility  3. Self Care  4. Interpersonal Interactions and Relationships   Number of elements that affect the Plan of Care: 4+: HIGH COMPLEXITY   CLINICAL PRESENTATION:   Presentation: Evolving clinical presentation with changing clinical characteristics: MODERATE COMPLEXITY   CLINICAL DECISION MAKIN [facility] Inpatient Short Form  How much difficulty does the patient currently have. .. Unable A Lot A Little None   1. Turning over in bed (including adjusting bedclothes, sheets and blankets)? [] 1   [] 2   [x] 3   [] 4   2. Sitting down on and standing up from a chair with arms ( e.g., wheelchair, bedside commode, etc.)   [] 1   [x] 2   [] 3   [] 4   3. Moving from lying on back to sitting on the side of the bed? [] 1   [] 2   [x] 3   [] 4   How much help from another person does the patient currently need. .. Total A Lot A Little None   4. Moving to and from a bed to a chair (including a wheelchair)? [] 1   [x] 2   [] 3   [] 4   5. Need to walk in hospital room? [] 1   [x] 2   [] 3   [] 4   6. Climbing 3-5 steps with a railing? [] 1   [x] 2   [] 3   [] 4   © , Trustees of [address] [place] [address] [address] [address], under license to [employer]. All rights reserved      Score:  Initial: 13 Most Recent: 14 (Date:2020)    Interpretation of Tool:  Represents activities that are increasingly more difficult (i.e. Bed mobility, Transfers, Gait). Medical Necessity:     · Patient is expected to demonstrate progress in   · strength, range of motion, balance, coordination, and functional technique  ·  to   · increase independence with all mobility.    · .  Reason for Services/Other Comments:  · Patient continues to require skilled intervention due to   · medical complications and mobility deficits which impact his level of function, safety, and independence as indicated above. · .   Use of outcome tool(s) and clinical judgement create a POC that gives a: Questionable prediction of patient's progress: MODERATE COMPLEXITY        TREATMENT:      Pre-treatment Symptoms/Complaints:  none  Pain: Initial: None Post Session:  None     Gait Training (  10 minutes):  Gait training to improve and/or restore physical functioning as related to mobility, strength, balance and dynamic movement of knee - right to improve functional positioning and swing phase of RLE. Ambulated 12 Feet (ft) with Moderate assistance using a Walker luke and moderate Tactile cues; Safety awareness training;Manual cues; Verbal cues related to their knee positioning, ankle positioning (ace wrap), sequencing, luke walker use, foot placement, and posture to promote proper body alignment, promote proper body posture and promote proper body mechanics. Instruction in performance of the above deficits to correct overall gait quality and functional mobility. Neuromuscular Re-education: ( 15 Minutes):  Exercise/activities including sitting/standing balance, posture, and weight shifts in sitting/standing below to improve balance, kinesthetic sense, posture and proprioception. Required moderate visual, verbal, manual and tactile cues to promote static and dynamic balance in standing and promote motor control of bilateral, lower extremity(s).                 Braces/Orthotics/Lines/Etc:   · O2 Device: Room Air   Treatment/Session Assessment:    · Response to Treatment:  See above  · Interdisciplinary Collaboration:   o Physical Therapy Assistant  o Registered Nurse  · After treatment position/precautions:   o Supine in bed  o Bed alarm/tab alert on  o Bed/Chair-wheels locked  o Bed in low position  o Call light within reach  o RN notified · Compliance with Program/Exercises: Compliant all of the time  · Recommendations/Intent for next treatment session: \"Next visit will focus on advancements to more challenging activities and reduction in assistance provided\".     Total Treatment Duration:  PT Patient Time In/Time Out  Time In: 1352  Time Out: [address], TOD[place]

## 2020-02-03 NOTE — PROGRESS NOTES
02/03/20 0702   NIH Stroke Scale   Interval Other (comment)  (Dual shift change, AL, Rn)   LOC 0   LOC Questions 0   LOC Commands 0   Best Gaze 0   Visual 0   Facial Palsy 1   Motor Right Arm 0   Motor Left Arm 2   Motor Right Leg 0   Motor Left Leg 2   Limb Ataxia 0   Sensory 0   Best Language 1   Dysarthria 1   Extinction and Inattention 0   Total 7

## 2020-02-03 NOTE — PROGRESS NOTES
Dysphagia   LTG: Patient will tolerate least restrictive diet without overt signs or symptoms of airway compromise. MET 3/2/20. STG: Patient will tolerate mechanical soft diet(ground meats/chopped vegetables) and thin liquids by cup without overt signs or symptoms of airway compromise. MET 3/2/20. STG: Patient will demonstrate use of compensatory strategies to improve swallow safety/function with 90% accuracy given minimal cueing. MET 3/2/20. STG: Patient participate in exercises to improve oral motor movement with 80% accuracy given minimal cueing. Progressing 1/31/2020. Discontinued 3/2/20. STG: Patient will participate in modified barium swallow study as clinically indicated. MET 1/8/2020    Neurolinguistic Goals:  LTG: Patient will increase neuro-linguistic abilities to increase safety and awareness in functional living environment   STG: Patient will participate in speech/language/cognitive evaluation. MET 12/17/19  STG: Patient will solve mathematical problems with 80%accuracy given minimal cueing   STG: Patient will name 10 items to concrete category in 1 minute given minimal cueing. Progressing 1/31/2020  STG: Patient will participate in verbal reasoning tasks with 80% accuracy given minimal cueing  STG: Patient will complete mental manipulation tasks with 80% accuracy given minimal cueing  STG: Patient will complete working memory/attention tasks with 80% accuracy given minimal cueing  STG: Patient will recall relevant verbal information with 80% accuracy with minimal assistance  STG: Patient will utilize memory compensatory strategies to improve recall of functional list/message with 90%accuracy given minimal assistance  STG: Patient will participate in ongoing cognitive linguistic evaluation for therapeutic assessment.  MET 1/31/2020    Dysarthria Goals:  LTG: Patient will develop functional and intelligible speech and utilize compensatory strategies to improve communication across environments  STG: Patient will fill in carrier phrase/complete automatic speech tasks with 95% accuracy given minimal cueing. MET 1/31/2020  STG: Patient will repeat phrases, sentences with 85% accuracy given minimal cueing. MET 1/31/2020  STG: Patient will utilize compensatory strategies across tasks with 90% accuracy given minimal cueing. MET 1/31/2020  STG: Patient will utilize compensatory strategies at conversation level with 90% accuracy independently. Added 1/31/2020    SPEECH LANGUAGE PATHOLOGY: DYSPHAGIA and SPEECH/LANGUAGE- Daily Note 1    NAME/AGE/GENDER: Shahana Mcdaniel is a 53 y.o. male  DATE: 2/3/2020  PRIMARY DIAGNOSIS: Acute ischemic stroke [facility]) [I63.9]  Cerebrovascular accident (CVA) due to embolism ([facility] [I63.9]      ICD-10: Treatment Diagnosis: R13.12 Dysphagia, Oropharyngeal Phase    INTERDISCIPLINARY COLLABORATION: Registered Nurse  PRECAUTIONS/ALLERGIES: Patient has no known allergies. SUBJECTIVE   Upright in bed and agreeable to participate in therapy. Orientation:   Person  Place  Time  Situation    Pain: Pain Scale 1: Numeric (0 - 10)  Pain Intensity 1: 0     OBJECTIVE   Swallowing: Patient presented with regular consistency cracker and thin liquids by cup. No overt s/sx airway comproimse with solids or liquids. Mildly prolonged/labored mastication of solid consistency. Adequate oral clearance. Patient reports liking current mechanical soft consistency diet. Speech/cognitive linguistic treatment:   Patient educated on compensatory strategies for memory deficits including utilizing repetition and first letter mnemonics to improve recall.  Patient presented with word lists that contained 3 words within the same semantic field and was given moderate verbal cues to utilizes the memory strategies:     Trial 1: recalled 2/3 items given mod verbal cue  Trial 2: recalled 2/3 independently  Trial 3: recalled 3/4 given mod verbal cues    Patient completed reasoning task sequencing directions from hospital to home with moderate verbal cues to include road name/landmarks. Patient able to explain 5 ADLs in which he will require assistance stating he would need help standing/walking, with meal prep and bathing. ASSESSMENT   Swallowing: Patient exhibited no overt s/sx airway compromise with regular consistency trials and thin liquids. Patient reports preferring current diet. Dysphagia goals met. Continue current diet: mechanical soft diet/thin liquids by cup only. No straws. Speech/Cognitive Function: Patient continues to exhibit moderate-severe short term memory deficit and moderately impaired reasoning skills, both of which continue to reduced his accuracy, safety, and independence with ADLs. Moderate dysarthria persists; however, significantly improved from start of care. Patient required min-mod verbal cues to repeat words/phrases when speech was unintelligible. Will continue to follow for speech and cognitive linguistic treatment for increased communication competence and increased safety/independence with ADLs. Tool Used: MODIFIED BRITT SCALE (mRS)    Score   No Symptoms  []? 0   No significant disability despite symptoms; able to carry out all usual duties and activities  []? 1   Slight disability; unable to carry out all previous activities but able to look after own affairs without assistance. []? 2   Moderate disability; requiring some help but able to walk without assistance  []? 3   Moderately severe disability; unable to walk without assistance and unable to attend to own bodily needs without assistance  []? 4   Severe disability; bedridden, incontinent, and requiring constant nursing care and attention  []? 5      Score:  Initial: 2 Most Recent: 2     Interpretation of Tool: The Modified [place] Scale is a 7-point scaled used to quantify level of disability as it relates to a patient's functional abilities.      Tool Used: Dysphagia Outcome and Severity Scale (ROCHELLE)    Score Comments   Normal Diet  [] 7 With no strategies or extra time needed   Functional Swallow  [] 6 May have mild oral or pharyngeal delay   Mild Dysphagia  [] 5 Which may require one diet consistency restricted    Mild-Moderate Dysphagia  [] 4 With 1-2 diet consistencies restricted   Moderate Dysphagia  [] 3 With 2 or more diet consistencies restricted   Moderate-Severe Dysphagia  [] 2 With partial PO strategies (trials with ST only)   Severe Dysphagia  [] 1 With inability to tolerate any PO safely      Score:  Initial:4 Most Recent: 5 (Date 02/03/20 )   Interpretation of Tool: The Dysphagia Outcome and Severity Scale (ROCHELLE) is a simple, easy-to-use, 7-point scale developed to systematically rate the functional severity of dysphagia based on objective assessment and make recommendations for diet level, independence level, and type of nutrition. PLAN    FREQUENCY/DURATION: Continue to follow patient 2 times a week for duration of hospital stay to address above goals. - Reduced frequency to 2 times a week as dysphagia goals were met. Will continue to follow for cognitive linguistic treatment.    - Recommendations for next treatment session: Next treatment will address dysarthria and cognitive deficits. REHABILITATION POTENTIAL FOR STATED GOALS: Good     COMPLIANCE WITH PROGRAM/EXERCISES: Will assess as treatment progresses    CONTINUATION OF SKILLED SERVICES/MEDICAL NECESSITY:   Patient is expected to demonstrate progress in   in order to  improve swallow safety, work toward diet advancement and decrease aspiration risk.    Patient continues to require skilled intervention for cognitive linguistic treatment and dysarthria          RECOMMENDATIONS   DIET:    PO:  Mechanical soft with ground meat/chopped vegatables   Liquids:  regular thin by cup only    MEDICATIONS: Crushed in puree     ASPIRATION PRECAUTIONS  · Slow rate of intake  · Small bites/sips  · Upright at 90 degrees during meal     COMPENSATORY STRATEGIES/MODIFICATIONS  · Alternate liquids/solids  · Small sips and bites  · Slow rate  · NO STRAWS   EDUCATION:  · Patient  · Wife       RECOMMENDATIONS for CONTINUED SPEECH THERAPY: YES: Anticipate need for ongoing speech therapy during this hospitalization and at next level of care.          SAFETY:  After treatment position/precautions:  · Upright in bed  · Call light within reach    Total Treatment Duration:   Time In: 1431  Time Out: [address], [address] [facility]

## 2020-02-03 NOTE — PROGRESS NOTES
Problem: Self Care Deficits Care Plan (Adult)  Goal: *Acute Goals and Plan of Care (Insert Text)  Description  GOALS UPDATED 1/22/2020:   1. Patient will complete dynamic sitting balance for ADLs with supervision. (Progressing, 2/3/2020 )  2. Patient will demonstrate appropriate safety awareness and protection of L UE during bed mobility and functional transfers with minimal cues. (Progressing, 2/3/2020)  3. Patient will complete total body bathing and dressing with moderate assistance and adaptive equipment as needed. (Progressing, partially met for bathing, continue goal for dressing, 2/3/2020 , )  4. Patient will complete weightbearing into the L UE with ADL tasks with minimal assistance to improve ability to use as a functional assist during ADL tasks. (Progressing, 2/3/2020 )  5. Patient will demonstrate modified independence with therapeutic exercises to improve strength in L UE for ADLs/functional transfers. (Progressing, 2/3/2020)  6. Patient will complete bed mobility with stand by assistance and adaptive equipment as needed in preparation for functional transfers. (Progressing, 2/3/2020)  7. 8. Patient will complete functional mobility for ADLs with moderate assistance and adaptive equipment as needed. (Goal Met)    New goal, 2/3/2020)  9. Patient will complete functional transfers with minimal assistance with equipment as needed.      Timeframe: 7 visits       Outcome: Progressing Towards Goal     OCCUPATIONAL THERAPY: Daily Note, Re-evaluation and PM    2/3/2020  INPATIENT: OT Visit Days: 1  Payor: MEDICAID PENDING / Plan: Ron Ganser PENDING / Product Type: Medicaid /      NAME/AGE/GENDER: Britney Burgess is a 53 y.o. male   PRIMARY DIAGNOSIS:  Acute ischemic stroke ([address] [I63.9]  Cerebrovascular accident (CVA) due to embolism ([address] [P35.2] Acute embolic stroke ([address] Acute embolic stroke ([address]       ICD-10: Treatment Diagnosis:    · Generalized Muscle Weakness (M62.81)  · Other lack of cordination (R27.8)  · Hemiplegia and hemiparesis following cerebral infarction affecting   · left non-dominant side (I69.354)  · Abnormal posture (R29.3)   Precautions/Allergies:    NO PULLING ON LUE   LUE in sling with shoulder joint approximated and supported   Patient has no known allergies. ASSESSMENT:     Mr. Jackie Anderson is a 53 y.o. male presents to the hospital with L sided weakness and acute ischemic CVA. MRI revealed acute to subacute L cerebellar and R paramedian yariel infarcts. At baseline pt lives with his wife and is independent. One finger wide subluxation noted in L shoulder (1/22/2020). 2/3/2020  Reassessment completed. Pt presents in supine upon arrival. Patient reported that his L shoulder pain has been alleviated with consistent KT. Pt transferred to sitting with mod a and additional time. Patient with L wrist pain with weight bearing with weight shift. Decreased weight bearing load to decrease L wrist pain. Applied KT to patient's L shoulder region for support and to manage L shoulder pain. Patient making slow, steady gains towards goals. Cont OT per tx plan. This section established at most recent assessment   PROBLEM LIST (Impairments causing functional limitations):  1. Decreased Strength  2. Decreased ADL/Functional Activities  3. Decreased Transfer Abilities  4. Decreased Ambulation Ability/Technique  5. Decreased Balance  6. Decreased Activity Tolerance  7. Increased Fatigue  8. Decreased Flexibility/Joint Mobility  9. Decreased Knowledge of Precautions  10. Decreased [place] with Home Exercise Program   INTERVENTIONS PLANNED: (Benefits and precautions of occupational therapy have been discussed with the patient.)  1. Activities of daily living training  2. Adaptive equipment training  3. Balance training  4. Clothing management  5. Cognitive training  6. Donning&doffing training  7. Keanu tech training  8. Neuromuscular re-eduation  9. Therapeutic activity  10.  Therapeutic exercise     TREATMENT PLAN: Frequency/Duration: Follow patient 3 times per week to address above goals. Rehabilitation Potential For Stated Goals: Excellent     REHAB RECOMMENDATIONS (at time of discharge pending progress):    Placement: It is my opinion, based on this patient's performance to date, that Mr. Antony Murray may benefit from intensive therapy at an [address] [place] [address] after discharge due to potential to make ongoing and sustainable functional improvement that is of practical value. [place] Pt functioning far below independent baseline, demonstrating good improvement and participation. Pt would likely benefit greatly and increase independence from inpatient rehab stay. Equipment:    TBD               OCCUPATIONAL PROFILE AND HISTORY:   History of Present Injury/Illness (Reason for Referral):  See H&P  Past Medical History/Comorbidities:   Mr. Antony Murray  has a past medical history of Acute ischemic stroke ([address] (12/12/2019), Acute pancreatitis (11/19/2014), Cerebrovascular accident (CVA) due to embolism ([address] (12/12/2019), Diabetes ([address] (2002), Diabetes ([address] Diabetes mellitus, and Hypertension. He also has no past medical history of Arthritis, Asthma, Autoimmune disease ([address] CAD (coronary artery disease), Cancer ([address] Chronic kidney disease, COPD, Dementia, Dementia ([address] Heart failure ([address] Ill-defined condition, Infectious disease, Liver disease, Other ill-defined conditions(799.89), Psychiatric disorder, PUD (peptic ulcer disease), Seizures ([address] or Sleep disorder. Mr. Antony Murray  has a past surgical history that includes hx hernia repair and hx orthopaedic.   Social History/Living Environment:   Home Environment: Apartment  # Steps to Enter: 12  Rails to Enter: Yes  [employer] : Bilateral  One/Two Story Residence: One story  Living Alone: No  Support Systems: Spouse/Significant Other/Partner  Patient Expects to be Discharged to[de-identified] Unknown  Current DME Used/Available at Home: None  Tub or Shower Type: Tub/Shower combination  Prior Level of Function/Work/Activity:  Pt lives at home with his wife. Pt is typically independent with ADL/functional mobility. Pt does not drive. Pt was working part-time at [employer]. Personal Factors:          Age:  53 y.o. Past/Current Experience:  CVA with flaccid L side        Other factors that influence how disability is experienced by the patient:  multiple co-morbidities    Number of Personal Factors/Comorbidities that affect the Plan of Care: Extensive review of physical, cognitive, and psychosocial performance (3+):  HIGH COMPLEXITY   ASSESSMENT OF OCCUPATIONAL PERFORMANCE[de-identified]   Activities of Daily Living:   Basic ADLs (From Assessment) Complex ADLs (From Assessment)   Feeding: Setup  Oral Facial Hygiene/Grooming: Moderate assistance  Bathing: Moderate assistance  Upper Body Dressing: Maximum assistance  Lower Body Dressing: Total assistance  Toileting: Total assistance Instrumental ADL  Meal Preparation: Total assistance  Homemaking: Total assistance   Grooming/Bathing/Dressing Activities of Daily Living     Cognitive Retraining  Safety/Judgement: Awareness of environment; Fall prevention;Decreased insight into deficits                       Bed/Mat Mobility  Supine to Sit: Moderate assistance  Sit to Supine: Moderate assistance  Sit to Stand: Moderate assistance  Stand to Sit: Moderate assistance     Most Recent Physical Functioning:   Gross Assessment:               LUE Strength  L Wrist Extension: 0  Posture:     Balance:  Sitting: Impaired  Sitting - Static: Fair (occasional)  Sitting - Dynamic: Fair (occasional)  Standing: Impaired  Standing - Static: Poor  Standing - Dynamic : Poor Bed Mobility:  Supine to Sit: Moderate assistance  Sit to Supine: Moderate assistance  Wheelchair Mobility:     Transfers:  Sit to Stand:  Moderate assistance  Stand to Sit: Moderate assistance            Patient Vitals for the past 6 hrs:   BP BP Patient Position SpO2 Pulse 20 1200 134/84 At rest 100 % 98       Mental Status  Neurologic State: Alert  Orientation Level: Oriented X4  Cognition: Follows commands  Perception: Cues to maintain midline in sitting, Cues to maintain midline in standing(pt fatigued this afternoon)  Perseveration: No perseveration noted  Safety/Judgement: Awareness of environment, Fall prevention, Decreased insight into deficits    LUE Strength  L Wrist Extension: 0                     Physical Skills Involved:  1. Range of Motion  2. Balance  3. Strength  4. Activity Tolerance  5. Fine Motor Control  6. Gross Motor Control Cognitive Skills Affected (resulting in the inability to perform in a timely and safe manner):  1. Perception  2. Expression Psychosocial Skills Affected:  1. Habits/Routines  2. Environmental Adaptation  3. Social Interaction  4. Emotional Regulation  5. Self-Awareness  6. Awareness of Others  7. Social Roles   Number of elements that affect the Plan of Care: 5+:  HIGH COMPLEXITY   CLINICAL DECISION MAKIN[address] [place] [id number] AM-PAC 6 Clicks   Daily Activity Inpatient Short Form  How much help from another person does the patient currently need. .. Total A Lot A Little None   1. Putting on and taking off regular lower body clothing? [x] 1   [] 2   [] 3   [] 4   2. Bathing (including washing, rinsing, drying)? [] 1   [x] 2   [] 3   [] 4   3. Toileting, which includes using toilet, bedpan or urinal?   [] 1   [x] 2   [] 3   [] 4   4. Putting on and taking off regular upper body clothing? [] 1   [x] 2   [] 3   [] 4   5. Taking care of personal grooming such as brushing teeth? [] 1   [x] 2   [] 3   [] 4   6. Eating meals? [] 1   [] 2   [x] 3   [] 4   © , Trustees of [address] [place] [id number], under license to [employer].  All rights reserved      Score:  Initial: 11 Most Recent: 12 (Date: 2020 )    Interpretation of Tool:  Represents activities that are increasingly more difficult (i.e. Bed mobility, Transfers, Gait).    Medical Necessity:     · Patient demonstrates   · good and excellent  ·  rehab potential due to higher previous functional level. Reason for Services/Other Comments:  · Patient continues to require skilled intervention due to   · Decreased independence with ADL/functional transfers that impacts overall quality of life. · .   Use of outcome tool(s) and clinical judgement create a POC that gives a: MODERATE COMPLEXITY         TREATMENT:   (In addition to Assessment/Re-Assessment sessions the following treatments were rendered)     Pre-treatment Symptoms/Complaints:    Pain: Initial:   Pain Intensity 1: 0  Post Session:  0       Neuromuscular Re-education: (30 minutes):  Exercise/activities per grid below to improve balance, coordination, kinesthetic sense and posture. Required moderate manual cues to promote static and dynamic balance in standing. Date:  1/27/2020 Date:   Date:     Activity/Exercise Parameters Parameters Parameters   LUE finger flexion, AROM gravity minimized then AAROM to complete ROM 2x10 reps     LUE finger extension AAROM 2x10 reps     LUE wrist extension, AROM gravity minimized then AAROM to complete ROM 1x10 reps     LUE wrist extension, AROM against gravity  1x10 reps     LUE wrist flexion AAROM 2x10 reps     LUE elbow flexion/extension PROM x10 reps     LUE shoulder flexion PROM x10 reps                      Braces/Orthotics/Lines/Etc:   · LUE sling   · O2 device: Room air  · Treatment/Session Assessment:    · Response to Treatment:  Pt demonstrated good participation.    · Interdisciplinary Collaboration:   o Certified Occupational Therapy Assistant  o Registered Nurse  · After treatment position/precautions:   o Supine in bed  o Bed alarm/tab alert on  o Bed/Chair-wheels locked  o Call light within reach  o RN notified  o Side rails x 3  o PCT at bedside   · Compliance with Program/Exercises: Compliant all of the time, Will assess as treatment progresses. · Recommendations/Intent for next treatment session: \"Next visit will focus on advancements to more challenging activities and reduction in assistance provided\".   Total Treatment Duration:  OT Patient Time In/Time Out  Time In: 1335  Time Out: [address], OT

## 2020-02-03 NOTE — PROGRESS NOTES
Problem: Patient Education: Go to Patient Education Activity  Goal: Patient/Family Education  Outcome: Progressing Towards Goal     Problem: Pressure Injury - Risk of  Goal: *Prevention of pressure injury  Description  Document Dewey Scale and appropriate interventions in the flowsheet. Outcome: Progressing Towards Goal  Note: Pressure Injury Interventions:  Sensory Interventions: Assess changes in LOC    Moisture Interventions: Assess need for specialty bed, Absorbent underpads, Check for incontinence Q2 hours and as needed, Limit adult briefs, Maintain skin hydration (lotion/cream), Offer toileting Q_hr, Moisture barrier, Minimize layers    Activity Interventions: Increase time out of bed, Pressure redistribution bed/mattress(bed type), PT/OT evaluation    Mobility Interventions: Assess need for specialty bed, HOB 30 degrees or less, Pressure redistribution bed/mattress (bed type), PT/OT evaluation, Turn and reposition approx. every two hours(pillow and wedges)    Nutrition Interventions: Document food/fluid/supplement intake    Friction and Shear Interventions: Foam dressings/transparent film/skin sealants, Lift sheet, Minimize layers, Apply protective barrier, creams and emollients, Transferring/repositioning devices                Problem: Patient Education: Go to Patient Education Activity  Goal: Patient/Family Education  Outcome: Progressing Towards Goal     Problem: Falls - Risk of  Goal: *Absence of Falls  Description  Document Marissa Dear Fall Risk and appropriate interventions in the flowsheet.   Outcome: Progressing Towards Goal  Note: Fall Risk Interventions:  Mobility Interventions: Bed/chair exit alarm, Communicate number of staff needed for ambulation/transfer, Patient to call before getting OOB, Utilize walker, cane, or other assistive device    Mentation Interventions: Bed/chair exit alarm    Medication Interventions: Bed/chair exit alarm, Patient to call before getting OOB, Teach patient to arise slowly    Elimination Interventions: Bed/chair exit alarm, Call light in reach, Patient to call for help with toileting needs, Urinal in reach, Toilet paper/wipes in reach, Toileting schedule/hourly rounds    History of Falls Interventions: Bed/chair exit alarm, Door open when patient unattended         Problem: Patient Education: Go to Patient Education Activity  Goal: Patient/Family Education  Outcome: Progressing Towards Goal     Problem: Diabetes Self-Management  Goal: *Disease process and treatment process  Description  Define diabetes and identify own type of diabetes; list 3 options for treating diabetes. Outcome: Progressing Towards Goal  Goal: *Incorporating nutritional management into lifestyle  Description  Describe effect of type, amount and timing of food on blood glucose; list 3 methods for planning meals. Outcome: Progressing Towards Goal  Goal: *Incorporating physical activity into lifestyle  Description  State effect of exercise on blood glucose levels. Outcome: Progressing Towards Goal  Goal: *Developing strategies to promote health/change behavior  Description  Define the ABC's of diabetes; identify appropriate screenings, schedule and personal plan for screenings. Outcome: Progressing Towards Goal  Goal: *Using medications safely  Description  State effect of diabetes medications on diabetes; name diabetes medication taking, action and side effects. Outcome: Progressing Towards Goal  Goal: *Monitoring blood glucose, interpreting and using results  Description  Identify recommended blood glucose targets  and personal targets. Outcome: Progressing Towards Goal  Goal: *Prevention, detection, treatment of acute complications  Description  List symptoms of hyper- and hypoglycemia; describe how to treat low blood sugar and actions for lowering  high blood glucose level.   Outcome: Progressing Towards Goal  Goal: *Prevention, detection and treatment of chronic complications  Description  Define the natural course of diabetes and describe the relationship of blood glucose levels to long term complications of diabetes.   Outcome: Progressing Towards Goal  Goal: *Developing strategies to address psychosocial issues  Description  Describe feelings about living with diabetes; identify support needed and support network  Outcome: Progressing Towards Goal     Problem: Patient Education: Go to Patient Education Activity  Goal: Patient/Family Education  Outcome: Progressing Towards Goal     Problem: General Medical Care Plan  Goal: *Vital signs within specified parameters  Outcome: Progressing Towards Goal  Goal: *Labs within defined limits  Outcome: Progressing Towards Goal  Goal: *Absence of infection signs and symptoms  Description  Wash hand more often   Outcome: Progressing Towards Goal  Goal: *Optimal pain control at patient's stated goal  Outcome: Progressing Towards Goal  Goal: *Skin integrity maintained  Outcome: Progressing Towards Goal  Goal: *Fluid volume balance  Outcome: Progressing Towards Goal  Goal: *Optimize nutritional status  Outcome: Progressing Towards Goal  Goal: *Anxiety reduced or absent  Outcome: Progressing Towards Goal  Goal: *Progressive mobility and function (eg: ADL's)  Outcome: Progressing Towards Goal     Problem: Patient Education: Go to Patient Education Activity  Goal: Patient/Family Education  Outcome: Progressing Towards Goal     Problem: Patient Education: Go to Patient Education Activity  Goal: Patient/Family Education  Outcome: Progressing Towards Goal     Problem: Patient Education: Go to Patient Education Activity  Goal: Patient/Family Education  Outcome: Progressing Towards Goal     Problem: Patient Education: Go to Patient Education Activity  Goal: Patient/Family Education  Outcome: Progressing Towards Goal     Problem: Pain  Goal: *Control of Pain  Outcome: Progressing Towards Goal     Problem: Patient Education: Go to Patient Education Activity  Goal: Patient/Family Education  Outcome: Progressing Towards Goal

## 2020-02-03 NOTE — PROGRESS NOTES
Hospitalist Progress Note     Admit Date:  2019  9:07 AM   Name:  Chel Wood   Age:  53 y.o.  :  1975   MRN:  [id number]   PCP:  Naun Quezada MD  Treatment Team: Attending Provider: Nestor Biggs MD; Care Manager: Marlee Bonilla, JERRY; Care Manager: Dwight Yanez LM; Consulting Provider: Ayana Simmons MD; Utilization Review: Bala Donnelly RN; Nurse Practitioner: Vee Montes NP; Charge Nurse: Silvana Colvin; Physical Therapy Assistant: Lachelle Pearson PTA    Subjective: This is a 52 yo male with PMH of DM II, HTN who presented 19 with c/o left leg and arm weakness, left facial droop and dysarthria.  Code S called. MRI with acute infarctions in left cerebellar hemisphere, deep frontoparietal white matter and right paramedian yariel.  Also noted to have old lacunar infarcts. CTA without large vessel occlusions, however some stenosis noted.  Echo showed PFO, Cardiology to f/u outpatient for evaluation for closure.  Neurology consulted.  Started on ASA, statin.  Pt is uninsured, difficult placement, case management working on plan.         2/3 patient seen and examined at bedside. No overnight events. No complaints. Nursing notes and chart reviewed. Review of Systems negative with exception of pertinent positives noted above. Objective:     Patient Vitals for the past 24 hrs:   Temp Pulse Resp BP SpO2   20 1200 97.5 °F (36.4 °C) 98 18 134/84 100 %   20 0800 97.3 °F (36.3 °C) 64 18 119/79 98 %   20 0400 97.8 °F (36.6 °C) 66 18 148/79 96 %   20 0000 98 °F (36.7 °C) 64 18 125/80 98 %   20 2000 97.4 °F (36.3 °C) (!) 56 18 122/81 97 %   20 1600 97.4 °F (36.3 °C) (!) 56 18 112/77 98 %     Oxygen Therapy  O2 Sat (%): 100 % (20 1200)  Pulse via Oximetry: 66 beats per minute (20 0000)  O2 Device: Room air (20 0000)  No intake or output data in the 24 hours ending 20 1310      General:    Well nourished. Alert. Left facial droop appreciated   CV:   RRR. No murmur, rub, or gallop. Lungs:   CTAB. No wheezing, rhonchi, or rales. Abdomen:   Soft, nontender, nondistended. Extremities: Warm and dry. No cyanosis or edema. Left upper extremity is in the sling and is able to move his fingers. Left lower extremity unable to wiggle toes. Skin:     No rashes or jaundice. Data Review:  I have reviewed all labs, meds, telemetry events, and studies from the last 24 hours. Recent Results (from the past 24 hour(s))   GLUCOSE, POC    Collection Time: 02/03/20  7:47 AM   Result Value Ref Range    Glucose (POC) 85 65 - 100 mg/dL        All Micro Results     None          Current Meds:  Current Facility-Administered Medications   Medication Dose Route Frequency    lip protectant (BLISTEX) ointment 1 Each  1 Each Topical PRN    acetaminophen (TYLENOL) tablet 650 mg  650 mg Oral Q6H    metFORMIN (GLUCOPHAGE) tablet 500 mg  500 mg Oral BID WITH MEALS    metoprolol succinate (TOPROL-XL) XL tablet 25 mg  25 mg Oral DAILY    polyethylene glycol (MIRALAX) packet 17 g  17 g Oral DAILY PRN    guaiFENesin (ROBITUSSIN) 100 mg/5 mL oral liquid 100 mg  100 mg Oral Q4H PRN    hydrALAZINE (APRESOLINE) 20 mg/mL injection 20 mg  20 mg IntraVENous Q4H PRN    atorvastatin (LIPITOR) tablet 80 mg  80 mg Oral QHS    lisinopril (PRINIVIL, ZESTRIL) tablet 40 mg  40 mg Oral DAILY    sodium chloride (NS) flush 5-40 mL  5-40 mL IntraVENous PRN    ondansetron (ZOFRAN) injection 4 mg  4 mg IntraVENous Q4H PRN    aspirin chewable tablet 81 mg  81 mg Oral DAILY    enoxaparin (LOVENOX) injection 40 mg  40 mg SubCUTAneous Q24H    dextrose 40% (GLUTOSE) oral gel 1 Tube  15 g Oral PRN    glucagon (GLUCAGEN) injection 1 mg  1 mg IntraMUSCular PRN    dextrose (D50W) injection syrg 12.5-25 g  25-50 mL IntraVENous PRN       Other Studies (last 24 hours):  No results found.     Assessment and Plan:     Hospital Problems as of 2/3/2020 Date Reviewed: 3/3/2017          Codes Class Noted - Resolved POA    PFO (patent foramen ovale) ICD-10-CM: Q21.1  ICD-9-CM: 745.5  12/17/2019 - Present Yes        Arrhythmia ICD-10-CM: I49.9  ICD-9-CM: 427.9  12/15/2019 - Present Yes        Hyperlipemia ICD-10-CM: E78.5  ICD-9-CM: 272.4  12/15/2019 - Present Yes        * (Principal) Acute embolic stroke [facility]) OKY-92-PD: I63.9  ICD-9-CM: 434.11  12/12/2019 - Present Yes        Hypertension (Chronic) ICD-10-CM: I10  ICD-9-CM: 401.9  Unknown - Present Yes        Type 2 diabetes mellitus (HCC) (Chronic) ICD-10-CM: E11.9  ICD-9-CM: 250.00  Unknown - Present Yes              PLAN:    Left shoulder pain, resolved (2/2)  -Tylenol every 6 hours scheduled as patient is frequently asking for it for left shoulder pain.        Acute embolic stroke: POA  -Dysphagia   -MRI head: with acute infarcts in L cerebellar hemisphere, deep frontoparietal white matter, and R paramedian yariel.  Also noted old lacunar infarcts. -ISMAEL: 3 mm PFO, Cardiology stated he needs an evaluation for closure PFO with high risk features including significant (3 mm) separation of septum secundum and primum as well as large shunt. Will await recovery of CVA. Plan 4 week event monitor at discharge. Will then see in offfice and if no arrythmias will plan PFO closure  -statin, ASA  -PT/OT/Speech      Hypertension: continue home meds      Type 2 diabetes mellitus:  Metformin     Arrhythmia: had brief wide complex tachycardia early in admission, will need event monitor as outpt per cards     DVT Prophylaxis: lovenox  DISPO: Difficult placement, case management working on plan. Patient has no medical insurance.   Pt can't go home as pt's wife is disabled and is not safe at home without supervision as pt needs long term care.       Signed:  Jessica Wright MD

## 2020-02-03 NOTE — PROGRESS NOTES
02/03/20 3780   NIH Stroke Scale   Interval Other (comment)   LOC 0   LOC Questions 0   LOC Commands 0   Best Gaze 0   Visual 0   Facial Palsy 1   Motor Right Arm 0   Motor Left Arm 2   Motor Right Leg 0   Motor Left Leg 2   Limb Ataxia 0   Sensory 0   Best Language 1   Dysarthria 1   Extinction and Inattention 0   Total 7

## 2020-02-03 NOTE — PROGRESS NOTES
Nutrition Assessment for:   Follow up     Assessment: Pt is 52 yo male who presented with weakness, facial drooping and slurred speech. He has history of CVA, DM type 2, HTN, pancreatitis, GERD and a smoker. SLP continues to follow for dysphagia, dysarthria, and cognitive linguistic function. Patient seen in bed with friend at bedside. His communication is only slightly limited at this RD visit. He states that he has continued to eat at his previously established baseline of ~75% of meals. He reports that he is drinking Glucerna 2-3 per day.       DIET NUTRITIONAL SUPPLEMENTS All Meals; Glucerna Shake  DIET DIABETIC CONSISTENT CARB Mechanical Soft       Anthropometrics:  Height: 5' 6\" (167.6 cm), Weight: 95.3 kg (210 lb), source not specified, Body mass index is 33.89 kg/m². BMI class of obese.     Macronutrient needs: MSJ (using CBW (Current body weight) unknown 95.3 kg)  EER: 2130 kcal/day (22 kcals/kg )  EPR: 106 g/day (1.1 g/kg ) (20%)     Intake/Comparative Standards: Per patient, consuming ~75% most meals plus Glucerna 2-3 per day. This meets % of kcal needs and % of protein needs with ONS.      Nutrition Intervention:  Meals and snacks: Continue current diet. Progressions per SLP. Medical food supplement therapy: Continue Glucerna TID (Boyd Kasveronica). Discharge Nutrition Plan: With improving PO intake, frequency of Glucerna may be reduced or discontinued at discharge.     [facility] [address] [place] [address], [address], [address] [facility]

## 2020-02-03 NOTE — PROGRESS NOTES
's follow-up visit to convey care and concern. No needs were voiced by patient and family.      Dontrell Moore 66  Board Certified

## 2020-02-04 LAB — GLUCOSE BLD STRIP.AUTO-MCNC: 96 MG/DL (ref 65–100)

## 2020-02-04 PROCEDURE — 74011250637 HC RX REV CODE- 250/637: Performed by: HOSPITALIST

## 2020-02-04 PROCEDURE — 51798 US URINE CAPACITY MEASURE: CPT

## 2020-02-04 PROCEDURE — 82962 GLUCOSE BLOOD TEST: CPT

## 2020-02-04 PROCEDURE — 65270000029 HC RM PRIVATE

## 2020-02-04 PROCEDURE — 74011250637 HC RX REV CODE- 250/637: Performed by: INTERNAL MEDICINE

## 2020-02-04 PROCEDURE — 74011250636 HC RX REV CODE- 250/636: Performed by: INTERNAL MEDICINE

## 2020-02-04 RX ADMIN — ACETAMINOPHEN 650 MG: 325 TABLET, FILM COATED ORAL at 22:49

## 2020-02-04 RX ADMIN — ACETAMINOPHEN 650 MG: 325 TABLET, FILM COATED ORAL at 11:39

## 2020-02-04 RX ADMIN — GUAIFENESIN 100 MG: 100 SOLUTION ORAL at 06:38

## 2020-02-04 RX ADMIN — ASPIRIN 81 MG 81 MG: 81 TABLET ORAL at 08:38

## 2020-02-04 RX ADMIN — ACETAMINOPHEN 650 MG: 325 TABLET, FILM COATED ORAL at 06:38

## 2020-02-04 RX ADMIN — METFORMIN HYDROCHLORIDE 500 MG: 500 TABLET, FILM COATED ORAL at 17:19

## 2020-02-04 RX ADMIN — ENOXAPARIN SODIUM 40 MG: 40 INJECTION SUBCUTANEOUS at 14:40

## 2020-02-04 RX ADMIN — METOPROLOL SUCCINATE 25 MG: 25 TABLET, FILM COATED, EXTENDED RELEASE ORAL at 08:38

## 2020-02-04 RX ADMIN — LISINOPRIL 40 MG: 20 TABLET ORAL at 08:37

## 2020-02-04 RX ADMIN — GUAIFENESIN 100 MG: 100 SOLUTION ORAL at 22:47

## 2020-02-04 RX ADMIN — METFORMIN HYDROCHLORIDE 500 MG: 500 TABLET, FILM COATED ORAL at 08:38

## 2020-02-04 RX ADMIN — ATORVASTATIN CALCIUM 80 MG: 80 TABLET, FILM COATED ORAL at 22:47

## 2020-02-04 RX ADMIN — GUAIFENESIN 100 MG: 100 SOLUTION ORAL at 17:22

## 2020-02-04 RX ADMIN — ACETAMINOPHEN 650 MG: 325 TABLET, FILM COATED ORAL at 17:19

## 2020-02-04 NOTE — PROGRESS NOTES
Problem: Patient Education: Go to Patient Education Activity  Goal: Patient/Family Education  Outcome: Progressing Towards Goal     Problem: Pressure Injury - Risk of  Goal: *Prevention of pressure injury  Description  Document Dewey Scale and appropriate interventions in the flowsheet. Outcome: Progressing Towards Goal  Note: Pressure Injury Interventions:  Sensory Interventions: Assess changes in LOC    Moisture Interventions: Absorbent underpads, Apply protective barrier, creams and emollients    Activity Interventions: Increase time out of bed, PT/OT evaluation    Mobility Interventions: HOB 30 degrees or less, PT/OT evaluation    Nutrition Interventions: Document food/fluid/supplement intake, Discuss nutritional consult with provider, Offer support with meals,snacks and hydration    Friction and Shear Interventions: HOB 30 degrees or less                Problem: Patient Education: Go to Patient Education Activity  Goal: Patient/Family Education  Outcome: Progressing Towards Goal     Problem: Falls - Risk of  Goal: *Absence of Falls  Description  Document Jeanne Fall Risk and appropriate interventions in the flowsheet.   Outcome: Progressing Towards Goal  Note: Fall Risk Interventions:  Mobility Interventions: Bed/chair exit alarm, Communicate number of staff needed for ambulation/transfer, OT consult for ADLs, Patient to call before getting OOB, PT Consult for mobility concerns    Mentation Interventions: Bed/chair exit alarm    Medication Interventions: Bed/chair exit alarm, Patient to call before getting OOB, Teach patient to arise slowly    Elimination Interventions: Bed/chair exit alarm, Call light in reach, Patient to call for help with toileting needs, Toileting schedule/hourly rounds    History of Falls Interventions: Bed/chair exit alarm, Consult care management for discharge planning, Door open when patient unattended         Problem: Patient Education: Go to Patient Education Activity  Goal: Patient/Family Education  Outcome: Progressing Towards Goal     Problem: Diabetes Self-Management  Goal: *Disease process and treatment process  Description  Define diabetes and identify own type of diabetes; list 3 options for treating diabetes. Outcome: Progressing Towards Goal  Goal: *Incorporating nutritional management into lifestyle  Description  Describe effect of type, amount and timing of food on blood glucose; list 3 methods for planning meals. Outcome: Progressing Towards Goal  Goal: *Incorporating physical activity into lifestyle  Description  State effect of exercise on blood glucose levels. Outcome: Progressing Towards Goal  Goal: *Developing strategies to promote health/change behavior  Description  Define the ABC's of diabetes; identify appropriate screenings, schedule and personal plan for screenings. Outcome: Progressing Towards Goal  Goal: *Using medications safely  Description  State effect of diabetes medications on diabetes; name diabetes medication taking, action and side effects. Outcome: Progressing Towards Goal  Goal: *Monitoring blood glucose, interpreting and using results  Description  Identify recommended blood glucose targets  and personal targets. Outcome: Progressing Towards Goal  Goal: *Prevention, detection, treatment of acute complications  Description  List symptoms of hyper- and hypoglycemia; describe how to treat low blood sugar and actions for lowering  high blood glucose level. Outcome: Progressing Towards Goal  Goal: *Prevention, detection and treatment of chronic complications  Description  Define the natural course of diabetes and describe the relationship of blood glucose levels to long term complications of diabetes.   Outcome: Progressing Towards Goal  Goal: *Developing strategies to address psychosocial issues  Description  Describe feelings about living with diabetes; identify support needed and support network  Outcome: Progressing Towards Goal     Problem: Patient Education: Go to Patient Education Activity  Goal: Patient/Family Education  Outcome: Progressing Towards Goal     Problem: Patient Education: Go to Patient Education Activity  Goal: Patient/Family Education  Outcome: Progressing Towards Goal     Problem: Nutrition Deficit  Goal: *Optimize nutritional status  Outcome: Progressing Towards Goal     Problem: Patient Education: Go to Patient Education Activity  Goal: Patient/Family Education  Outcome: Progressing Towards Goal     Problem: General Medical Care Plan  Goal: *Vital signs within specified parameters  Outcome: Progressing Towards Goal  Goal: *Labs within defined limits  Outcome: Progressing Towards Goal  Goal: *Absence of infection signs and symptoms  Description  Wash hand more often   Outcome: Progressing Towards Goal  Goal: *Optimal pain control at patient's stated goal  Outcome: Progressing Towards Goal  Goal: *Skin integrity maintained  Outcome: Progressing Towards Goal  Goal: *Fluid volume balance  Outcome: Progressing Towards Goal  Goal: *Optimize nutritional status  Outcome: Progressing Towards Goal  Goal: *Anxiety reduced or absent  Outcome: Progressing Towards Goal  Goal: *Progressive mobility and function (eg: ADL's)  Outcome: Progressing Towards Goal     Problem: Patient Education: Go to Patient Education Activity  Goal: Patient/Family Education  Outcome: Progressing Towards Goal     Problem: Patient Education: Go to Patient Education Activity  Goal: Patient/Family Education  Outcome: Progressing Towards Goal     Problem: Patient Education: Go to Patient Education Activity  Goal: Patient/Family Education  Outcome: Progressing Towards Goal     Problem: Patient Education: Go to Patient Education Activity  Goal: Patient/Family Education  Outcome: Progressing Towards Goal     Problem: Ischemic Stroke: Discharge Outcomes  Goal: *Verbalizes anxiety and depression are reduced or absent  Outcome: Progressing Towards Goal  Goal: *Verbalize understanding of risk factor modification(Stroke Metric)  Outcome: Progressing Towards Goal  Goal: *Hemodynamically stable  Outcome: Progressing Towards Goal  Goal: *Absence of aspiration pneumonia  Outcome: Progressing Towards Goal  Goal: *Aware of needed dietary changes  Outcome: Progressing Towards Goal  Goal: *Verbalize understanding of prescribed medications including anti-coagulants, anti-lipid, and/or anti-platelets(Stroke Metric)  Outcome: Progressing Towards Goal  Goal: *Tolerating diet  Outcome: Progressing Towards Goal  Goal: *Aware of follow-up diagnostics related to anticoagulants  Outcome: Progressing Towards Goal  Goal: *Ability to perform ADLs and demonstrates progressive mobility and function  Outcome: Progressing Towards Goal  Goal: *Absence of DVT(Stroke Metric)  Outcome: Progressing Towards Goal  Goal: *Absence of aspiration  Outcome: Progressing Towards Goal  Goal: *Optimal pain control at patient's stated goal  Outcome: Progressing Towards Goal  Goal: *Home safety concerns addressed  Outcome: Progressing Towards Goal  Goal: *Describes available resources and support systems  Outcome: Progressing Towards Goal  Goal: *Verbalizes understanding of activation of EMS(911) for stroke symptoms(Stroke Metric)  Outcome: Progressing Towards Goal  Goal: *Understands and describes signs and symptoms to report to providers(Stroke Metric)  Outcome: Progressing Towards Goal  Goal: *Neurolgocially stable (absence of additional neurological deficits)  Outcome: Progressing Towards Goal  Goal: *Verbalizes importance of follow-up with primary care physician(Stroke Metric)  Outcome: Progressing Towards Goal  Goal: *Smoking cessation discussed,if applicable(Stroke Metric)  Outcome: Progressing Towards Goal  Goal: *Depression screening completed(Stroke Metric)  Outcome: Progressing Towards Goal     Problem: Pain  Goal: *Control of Pain  Outcome: Progressing Towards Goal     Problem: Patient Education: Go to Patient Education Activity  Goal: Patient/Family Education  Outcome: Progressing Towards Goal

## 2020-02-04 NOTE — PROGRESS NOTES
02/04/20 0400   NIH Stroke Scale   Interval Other (comment)  (Neuro checks)   LOC 0   LOC Questions 0   LOC Commands 0   Motor Right Arm 0   Motor Left Arm 2   Motor Right Leg 0   Motor Left Leg 2   Dysarthria 1     Neuro checks

## 2020-02-04 NOTE — PROGRESS NOTES
Hospitalist Progress Note     Admit Date:  2019  9:07 AM   Name:  Pancho Chin   Age:  53 y.o.  :  1975   MRN:  [id number]   PCP:  Clara Oviedo MD  Treatment Team: Attending Provider: Stacey Mcarthur MD; Care Manager: Corwin Teran, RN; Care Manager: Chelo Mace LMSW; Consulting Provider: Edmund Stewart MD; Utilization Review: Akira Palacios RN; Nurse Practitioner: Chichi Romo NP; Charge Nurse: Eve Haque Speech Language Pathologist: Mckenna Sharma    Subjective: This is a 52 yo male with PMH of DM II, HTN who presented 19 with c/o left leg and arm weakness, left facial droop and dysarthria.  Code S called. MRI with acute infarctions in left cerebellar hemisphere, deep frontoparietal white matter and right paramedian yariel.  Also noted to have old lacunar infarcts. CTA without large vessel occlusions, however some stenosis noted.  Echo showed PFO, Cardiology to f/u outpatient for evaluation for closure.  Neurology consulted.  Started on ASA, statin.  Pt is uninsured, difficult placement, case management working on plan.         2/ patient seen examined at bedside. Overnight events. He was in the bathroom and has no difficulties with bowel or bladder function. Currently eating lunch without any difficulties. No pain. Nursing notes and chart reviewed. Review of Systems negative with exception of pertinent positives noted above.       Objective:     Patient Vitals for the past 24 hrs:   Temp Pulse Resp BP SpO2   20 1138 98.1 °F (36.7 °C) 69  144/86 97 %   20 0753 97.5 °F (36.4 °C) 62 16 124/77 98 %   20 0400 97.5 °F (36.4 °C) 67 17 126/86 97 %   20 0000 98.1 °F (36.7 °C) 72 17 138/89 95 %   20 2000 98.2 °F (36.8 °C) 63 17 131/81 95 %   20 1600 97.8 °F (36.6 °C) (!) 111 18 122/81 92 %   20 1200 97.5 °F (36.4 °C) 98 18 134/84 100 %     Oxygen Therapy  O2 Sat (%): 97 % (20 1138)  Pulse via Oximetry: 66 beats per minute (02/01/20 0000)  O2 Device: Room air (02/01/20 0000)  No intake or output data in the 24 hours ending 02/04/20 1150      General:    Well nourished. Alert. Still has a facial droop  CV:   RRR. No murmur, rub, or gallop. Lungs:   CTAB. No wheezing, rhonchi, or rales. Abdomen:   Soft, nontender, nondistended. Extremities: Warm and dry. No cyanosis or edema. Left arm is in sling  Skin:     No rashes or jaundice. Data Review:  I have reviewed all labs, meds, telemetry events, and studies from the last 24 hours.     Recent Results (from the past 24 hour(s))   GLUCOSE, POC    Collection Time: 02/04/20  7:11 AM   Result Value Ref Range    Glucose (POC) 96 65 - 100 mg/dL        All Micro Results     None          Current Meds:  Current Facility-Administered Medications   Medication Dose Route Frequency    lip protectant (BLISTEX) ointment 1 Each  1 Each Topical PRN    acetaminophen (TYLENOL) tablet 650 mg  650 mg Oral Q6H    metFORMIN (GLUCOPHAGE) tablet 500 mg  500 mg Oral BID WITH MEALS    metoprolol succinate (TOPROL-XL) XL tablet 25 mg  25 mg Oral DAILY    polyethylene glycol (MIRALAX) packet 17 g  17 g Oral DAILY PRN    guaiFENesin (ROBITUSSIN) 100 mg/5 mL oral liquid 100 mg  100 mg Oral Q4H PRN    hydrALAZINE (APRESOLINE) 20 mg/mL injection 20 mg  20 mg IntraVENous Q4H PRN    atorvastatin (LIPITOR) tablet 80 mg  80 mg Oral QHS    lisinopril (PRINIVIL, ZESTRIL) tablet 40 mg  40 mg Oral DAILY    sodium chloride (NS) flush 5-40 mL  5-40 mL IntraVENous PRN    ondansetron (ZOFRAN) injection 4 mg  4 mg IntraVENous Q4H PRN    aspirin chewable tablet 81 mg  81 mg Oral DAILY    enoxaparin (LOVENOX) injection 40 mg  40 mg SubCUTAneous Q24H    dextrose 40% (GLUTOSE) oral gel 1 Tube  15 g Oral PRN    glucagon (GLUCAGEN) injection 1 mg  1 mg IntraMUSCular PRN    dextrose (D50W) injection syrg 12.5-25 g  25-50 mL IntraVENous PRN       Other Studies (last 24 hours):  No results found.    Assessment and Plan:     Hospital Problems as of 2/4/2020 Date Reviewed: 3/3/2017          Codes Class Noted - Resolved POA    PFO (patent foramen ovale) ICD-10-CM: Q21.1  ICD-9-CM: 745.5  12/17/2019 - Present Yes        Arrhythmia ICD-10-CM: I49.9  ICD-9-CM: 427.9  12/15/2019 - Present Yes        Hyperlipemia ICD-10-CM: E78.5  ICD-9-CM: 272.4  12/15/2019 - Present Yes        * (Principal) Acute embolic stroke [facility]) ULG-84-NS: I63.9  ICD-9-CM: 434.11  12/12/2019 - Present Yes        Hypertension (Chronic) ICD-10-CM: I10  ICD-9-CM: 401.9  Unknown - Present Yes        Type 2 diabetes mellitus (HCC) (Chronic) ICD-10-CM: E11.9  ICD-9-CM: 250.00  Unknown - Present Yes              PLAN:    Left shoulder pain, resolved (2/2)  -Tylenol every 6 hours scheduled as patient is frequently asking for it for left shoulder pain.        Acute embolic stroke: POA  -Dysphagia   -MRI head: with acute infarcts in L cerebellar hemisphere, deep frontoparietal white matter, and R paramedian yariel.  Also noted old lacunar infarcts. -ISMAEL: 3 mm PFO, Cardiology stated he needs an evaluation for closure PFO with high risk features including significant (3 mm) separation of septum secundum and primum as well as large shunt.  Will await recovery of CVA.  Plan 4 week event monitor at discharge.  Will then see in offfice and if no arrythmias will plan PFO closure  -statin, ASA  -PT/OT/Speech   -will check labs periodicly as last check was 1/27     Hypertension: continue home meds      Type 2 diabetes mellitus:  Metformin     Arrhythmia: had brief wide complex tachycardia early in admission, will need event monitor as outpt per cards     DVT Prophylaxis: lovenox  DISPO: Difficult placement, case management working on plan. Patient has no medical insurance. Pt can't go home as pt's wife is disabled and is not safe at home without supervision as pt needs long term care.     Signed:  Kayla Mullins MD

## 2020-02-04 NOTE — PROGRESS NOTES
02/04/20 0704   NIH Stroke Scale   Interval Other (comment)  (Dual NIH with Ethel EDWARDS)   LOC 0   LOC Questions 0   LOC Commands 0   Best Gaze 0   Visual 0   Facial Palsy 1   Motor Right Arm 0   Motor Left Arm 2   Motor Right Leg 0   Motor Left Leg 2   Limb Ataxia 0   Sensory 0   Best Language 1   Dysarthria 1   Extinction and Inattention 0   Total 7     Dual NIH with Ethel EDWARDS

## 2020-02-04 NOTE — PROGRESS NOTES
02/03/20 2015   NIH Stroke Scale   Interval Other (comment)  (Neuro checks)   LOC 0   LOC Questions 0   LOC Commands 0   Motor Right Arm 0   Motor Left Arm 2   Motor Right Leg 0   Motor Left Leg 2   Dysarthria 1     Neuro checks

## 2020-02-04 NOTE — PROGRESS NOTES
02/04/20 0000   NIH Stroke Scale   Interval Other (comment)  (Neuro checks)   LOC 0   LOC Questions 0   LOC Commands 0   Motor Right Arm 0   Motor Left Arm 2   Motor Right Leg 0   Motor Left Leg 2   Dysarthria 1     Neuro checks

## 2020-02-05 LAB
ANION GAP SERPL CALC-SCNC: 7 MMOL/L (ref 7–16)
BASOPHILS # BLD: 0 K/UL (ref 0–0.2)
BASOPHILS NFR BLD: 1 % (ref 0–2)
BUN SERPL-MCNC: 11 MG/DL (ref 6–23)
CALCIUM SERPL-MCNC: 9.6 MG/DL (ref 8.3–10.4)
CHLORIDE SERPL-SCNC: 111 MMOL/L (ref 98–107)
CO2 SERPL-SCNC: 25 MMOL/L (ref 21–32)
CREAT SERPL-MCNC: 1.05 MG/DL (ref 0.8–1.5)
DIFFERENTIAL METHOD BLD: ABNORMAL
EOSINOPHIL # BLD: 0.1 K/UL (ref 0–0.8)
EOSINOPHIL NFR BLD: 1 % (ref 0.5–7.8)
ERYTHROCYTE [DISTWIDTH] IN BLOOD BY AUTOMATED COUNT: 17.3 % (ref 11.9–14.6)
GLUCOSE BLD STRIP.AUTO-MCNC: 95 MG/DL (ref 65–100)
GLUCOSE SERPL-MCNC: 73 MG/DL (ref 65–100)
HCT VFR BLD AUTO: 37.8 % (ref 41.1–50.3)
HGB BLD-MCNC: 11.8 G/DL (ref 13.6–17.2)
IMM GRANULOCYTES # BLD AUTO: 0 K/UL (ref 0–0.5)
IMM GRANULOCYTES NFR BLD AUTO: 0 % (ref 0–5)
LYMPHOCYTES # BLD: 1.9 K/UL (ref 0.5–4.6)
LYMPHOCYTES NFR BLD: 39 % (ref 13–44)
MAGNESIUM SERPL-MCNC: 1.5 MG/DL (ref 1.8–2.4)
MCH RBC QN AUTO: 25.9 PG (ref 26.1–32.9)
MCHC RBC AUTO-ENTMCNC: 31.2 G/DL (ref 31.4–35)
MCV RBC AUTO: 82.9 FL (ref 79.6–97.8)
MONOCYTES # BLD: 0.3 K/UL (ref 0.1–1.3)
MONOCYTES NFR BLD: 6 % (ref 4–12)
NEUTS SEG # BLD: 2.6 K/UL (ref 1.7–8.2)
NEUTS SEG NFR BLD: 52 % (ref 43–78)
NRBC # BLD: 0 K/UL (ref 0–0.2)
PLATELET # BLD AUTO: 192 K/UL (ref 150–450)
PMV BLD AUTO: 11.6 FL (ref 9.4–12.3)
POTASSIUM SERPL-SCNC: 3.7 MMOL/L (ref 3.5–5.1)
RBC # BLD AUTO: 4.56 M/UL (ref 4.23–5.6)
SODIUM SERPL-SCNC: 143 MMOL/L (ref 136–145)
WBC # BLD AUTO: 4.9 K/UL (ref 4.3–11.1)

## 2020-02-05 PROCEDURE — 97530 THERAPEUTIC ACTIVITIES: CPT

## 2020-02-05 PROCEDURE — 36415 COLL VENOUS BLD VENIPUNCTURE: CPT

## 2020-02-05 PROCEDURE — 74011250636 HC RX REV CODE- 250/636: Performed by: INTERNAL MEDICINE

## 2020-02-05 PROCEDURE — 80048 BASIC METABOLIC PNL TOTAL CA: CPT

## 2020-02-05 PROCEDURE — 74011250637 HC RX REV CODE- 250/637: Performed by: HOSPITALIST

## 2020-02-05 PROCEDURE — 82962 GLUCOSE BLOOD TEST: CPT

## 2020-02-05 PROCEDURE — 65270000029 HC RM PRIVATE

## 2020-02-05 PROCEDURE — 74011250637 HC RX REV CODE- 250/637: Performed by: INTERNAL MEDICINE

## 2020-02-05 PROCEDURE — 83735 ASSAY OF MAGNESIUM: CPT

## 2020-02-05 PROCEDURE — 85025 COMPLETE CBC W/AUTO DIFF WBC: CPT

## 2020-02-05 PROCEDURE — 97116 GAIT TRAINING THERAPY: CPT

## 2020-02-05 RX ADMIN — ACETAMINOPHEN 650 MG: 325 TABLET, FILM COATED ORAL at 11:41

## 2020-02-05 RX ADMIN — ASPIRIN 81 MG 81 MG: 81 TABLET ORAL at 07:55

## 2020-02-05 RX ADMIN — METFORMIN HYDROCHLORIDE 500 MG: 500 TABLET, FILM COATED ORAL at 07:55

## 2020-02-05 RX ADMIN — LISINOPRIL 40 MG: 20 TABLET ORAL at 07:55

## 2020-02-05 RX ADMIN — GUAIFENESIN 100 MG: 100 SOLUTION ORAL at 16:41

## 2020-02-05 RX ADMIN — ATORVASTATIN CALCIUM 80 MG: 80 TABLET, FILM COATED ORAL at 21:39

## 2020-02-05 RX ADMIN — ACETAMINOPHEN 650 MG: 325 TABLET, FILM COATED ORAL at 16:38

## 2020-02-05 RX ADMIN — ENOXAPARIN SODIUM 40 MG: 40 INJECTION SUBCUTANEOUS at 14:46

## 2020-02-05 RX ADMIN — METOPROLOL SUCCINATE 25 MG: 25 TABLET, FILM COATED, EXTENDED RELEASE ORAL at 07:55

## 2020-02-05 RX ADMIN — ACETAMINOPHEN 650 MG: 325 TABLET, FILM COATED ORAL at 05:10

## 2020-02-05 RX ADMIN — METFORMIN HYDROCHLORIDE 500 MG: 500 TABLET, FILM COATED ORAL at 16:38

## 2020-02-05 RX ADMIN — ACETAMINOPHEN 650 MG: 325 TABLET, FILM COATED ORAL at 23:35

## 2020-02-05 NOTE — PROGRESS NOTES
02/05/20 0732   NIH Stroke Scale   Interval Other (comment)  (Dual NIH with Jannie Chapa)   LOC 0   LOC Questions 0   LOC Commands 0   Best Gaze 0   Visual 0   Facial Palsy 1   Motor Right Arm 0   Motor Left Arm 2   Motor Right Leg 0   Motor Left Leg 2   Limb Ataxia 0   Sensory 0   Best Language 1   Dysarthria 1   Extinction and Inattention 0   Total 7     Bedside report to Jannie Chapa RN

## 2020-02-05 NOTE — PROGRESS NOTES
02/05/20 0001   NIH Stroke Scale   Interval Other (comment)  (Neuro checks)   LOC 0   LOC Questions 0   LOC Commands 0   Motor Right Arm 0   Motor Left Arm 2   Motor Right Leg 0   Motor Left Leg 2   Dysarthria 1     Neuro checks

## 2020-02-05 NOTE — PROGRESS NOTES
02/04/20 2300   NIH Stroke Scale   Interval Other (comment)  (Neuro checks)   LOC 0   LOC Questions 0   LOC Commands 0   Motor Right Arm 0   Motor Left Arm 2   Motor Right Leg 0   Motor Left Leg 2   Dysarthria 1     Neuro checks

## 2020-02-05 NOTE — PROGRESS NOTES
Problem: Mobility Impaired (Adult and Pediatric)  Goal: *Acute Goals and Plan of Care  Description  Acute PT Goals (reviewed & updated 1/29/2020):  STG:  (1.) Mr. Kyung Cisse will perform bed mobility with MINIMAL ASSISTANCE within 3 treatment day(s). (2.) Mr. Kyung Cisse will demonstrate good dynamic sitting balance within 3 treatment days. (3.) Mr. Kyung Cisse will transfer sit to stand with MINIMAL ASSISTANCE X1 within 3 treatment days. (4.)Mr. eSth Schmidt will transfer from bed to chair and chair to bed with MODERATE ASSIST x1 using the least restrictive device within 3 treatment day(s). LTG:  (1.) Mr. Kyung Cisse will perform bed mobility with CONTACT GUARD ASSISTANCE within 7 treatment day(s). (2.) Mr. Kyung Cisse will transfer from bed to chair and chair to bed with MINIMAL ASSIST using the least restrictive device within 7 treatment day(s). (3.) Mr. Kyung Cisse will ambulate with MODERATE ASSIST for 5+ feet with the least restrictive device to assist with transfers within 7 treatment day(s). (4.) Mr. Kyung Cisse will perform standing static and dynamic balance activities x 10 minutes with MINIMAL ASSIST to improve safety within 7 treatment day(s). (5.)Mr. Kyung Cisse will demonstrate FAIR DYNAMIC STANDING balance within 7 treatment days. (6.) Mr. Kyung Cisse will tolerate 25+ minutes of neuromuscular re-education and/or therapeutic activity/exercise while maintaining stable vitals within 7 treatment day(s).      PHYSICAL THERAPY: Daily Note and PM 2/5/2020  INPATIENT: PT Visit Days : 5  Payor: MEDICAID PENDING / Plan: [employer] PENDING / Product Type: Medicaid /       NAME/AGE/GENDER: Alanna Boss is a 53 y.o. male   PRIMARY DIAGNOSIS: Acute ischemic stroke ([address] [I63.9]  Cerebrovascular accident (CVA) due to embolism ([address] [Z28.6] Acute embolic stroke ([address] Acute embolic stroke ([address]       ICD-10: Treatment Diagnosis:   · Other lack of cordination (R27.8)  · Difficulty in walking, Not elsewhere classified (R26.2)  · Other abnormalities of gait and mobility (R26.89)  · Unspecified Lack of Coordination (R27.9)  · Hemiplegia and hemiparesis following cerebral infarction affecting   · left non-dominant side (F89.180)   Precaution/Allergies:  Patient has no known allergies. ASSESSMENT:     Mr. Fernando Bell is a 53year old admitted with acute CVA with left hemiparesis and left inattention. Patient was supine upon contact and agreeable to PT. Patient performs supine to sit with mod assist, additional time, and cues to utilize compensatory strategies to assist with bed mobility. Pt stood with min assist and was able to ambulate about 10' using rolling walker and mod assist x 2. Pt requires verbal and manual cues to provide increased knee stability, increased step length L, walker placement, and improved step length R LE. Pt rested in chair and then ambulated back to bed, 10' with same assist.  Pt getting fatigued at end of ambulation. Pt returned supine and was left with needs in reach. Pt is making progress towards goals with improved ambulation. Will continue efforts. This section established at most recent assessment   PROBLEM LIST (Impairments causing functional limitations):  1. Decreased Strength  2. Decreased ADL/Functional Activities  3. Decreased Transfer Abilities  4. Decreased Ambulation Ability/Technique  5. Decreased Balance  6. Increased Pain  7. Decreased Activity Tolerance  8. Increased Fatigue  9. Decreased Flexibility/Joint Mobility   INTERVENTIONS PLANNED: (Benefits and precautions of physical therapy have been discussed with the patient.)  1. Balance Exercise  2. Bed Mobility  3. Family Education  4. Gait Training  5. Home Exercise Program (HEP)  6. Manual Therapy  7. Neuromuscular Re-education/Strengthening  8. Range of Motion (ROM)  9. Therapeutic Activites  10. Therapeutic Exercise/Strengthening  11.  Transfer Training     TREATMENT PLAN: Frequency/Duration: 3 times a week for duration of hospital stay  Rehabilitation Potential For Stated Goals: Good     REHAB RECOMMENDATIONS (at time of discharge pending progress):    Placement: It is my opinion, based on this patient's performance to date, that Mr. Clovis Jordan may benefit from intensive therapy at an [address] [place] [address] after discharge due to a probable need for 24 hour rehab nursing, a probable need for multiple therapy disciplines, and potential to make ongoing and sustainable functional improvement that is of practical value. .  Equipment:    TBD pending progress with therapy. HISTORY:   History of Present Injury/Illness (Reason for Referral):  Per H&P: \"Pt is a 54 y/o smoker with DM, HTN, who presented to ER with L leg and arm weakness, L facial droop, dysarthria. First noted L leg weakness late night 12/11 when he woke up to go to the bathroom. He was normal when he went to bed around 1030. Woke up this morning and had persistent weakness L leg and also now noted in L arm. EMS called. Noted to have slurred speech and L facial droop as well. Code S called in ER around 9am.  MRI with acute infarcts in L cerebellar hemisphere, deep frontoparietal white matter, and R paramedian yariel. Also noted old lacunar infarcts. No large vessel occlusion on CTA, but some stenosis noted. No hx afib, TIA, CVA. No CP, palpitations, SOB. \"  Past Medical History/Comorbidities:   Mr. Clovis Jordan  has a past medical history of Acute ischemic stroke ([address] (12/12/2019), Acute pancreatitis (11/19/2014), Cerebrovascular accident (CVA) due to embolism ([address] (12/12/2019), Diabetes ([address] (2002), Diabetes ([address] Diabetes mellitus, and Hypertension.  He also has no past medical history of Arthritis, Asthma, Autoimmune disease ([address] CAD (coronary artery disease), Cancer ([address] Chronic kidney disease, COPD, Dementia, Dementia ([address] Heart failure ([address] Ill-defined condition, Infectious disease, Liver disease, Other ill-defined conditions(799.89), Psychiatric disorder, PUD (peptic ulcer disease), Seizures ([facility] or Sleep disorder. Mr. Latrell Collins  has a past surgical history that includes hx hernia repair and hx orthopaedic. Social History/Living Environment:   Home Environment: Apartment  # Steps to Enter: 12  Rails to Enter: Yes  [employer] : Bilateral  One/Two Story Residence: One story  Living Alone: No  Support Systems: Spouse/Significant Other/Partner  Patient Expects to be Discharged to[de-identified] Unknown  Current DME Used/Available at Home: None  Tub or Shower Type: Tub/Shower combination  Prior Level of Function/Work/Activity:  Independent, lives with wife in 2nd story 1 level apartment. No recent falls. Number of Personal Factors/Comorbidities that affect the Plan of Care: 1-2: MODERATE COMPLEXITY   EXAMINATION:   Most Recent Physical Functioning:   Gross Assessment:                  Posture:     Balance:  Sitting - Static: Good (unsupported); Fair (occasional)  Sitting - Dynamic: Fair (occasional)  Standing - Static: Poor; Fair  Standing - Dynamic : Poor Bed Mobility:  Supine to Sit: Moderate assistance  Sit to Supine: Moderate assistance  Wheelchair Mobility:     Transfers:  Sit to Stand: Moderate assistance  Stand to Sit: Minimum assistance; Moderate assistance  Gait:     Base of Support: Shift to right;Center of gravity altered;Narrowed  Speed/Clotilde: Delayed; Shuffled; Slow  Step Length: Left shortened;Right shortened  Gait Abnormalities: Hemiplegic  Distance (ft): 10 Feet (ft)(x 2)  Assistive Device: Walker luke  Ambulation - Level of Assistance: Moderate assistance  Interventions: Manual cues; Safety awareness training;Verbal cues; Tactile cues      Body Structures Involved:  1. Nerves  2. Voice/Speech  3. Bones  4. Joints  5. Muscles Body Functions Affected:  1. Mental  2. Sensory/Pain  3. Neuromusculoskeletal  4. Movement Related Activities and Participation Affected:  1. General Tasks and Demands  2. Mobility  3. Self Care  4.  Interpersonal Interactions and Relationships   Number of elements that affect the Plan of Care: 4+: HIGH COMPLEXITY   CLINICAL PRESENTATION:   Presentation: Evolving clinical presentation with changing clinical characteristics: MODERATE COMPLEXITY   CLINICAL DECISION MAKIN [facility] Short Form  How much difficulty does the patient currently have. .. Unable A Lot A Little None   1. Turning over in bed (including adjusting bedclothes, sheets and blankets)? [] 1   [] 2   [x] 3   [] 4   2. Sitting down on and standing up from a chair with arms ( e.g., wheelchair, bedside commode, etc.)   [] 1   [x] 2   [] 3   [] 4   3. Moving from lying on back to sitting on the side of the bed? [] 1   [] 2   [x] 3   [] 4   How much help from another person does the patient currently need. .. Total A Lot A Little None   4. Moving to and from a bed to a chair (including a wheelchair)? [] 1   [x] 2   [] 3   [] 4   5. Need to walk in hospital room? [] 1   [x] 2   [] 3   [] 4   6. Climbing 3-5 steps with a railing? [] 1   [x] 2   [] 3   [] 4   © , Trustees of [address] [place] [address], under license to [employer]. All rights reserved      Score:  Initial: 13 Most Recent: 14 (Date:2020)    Interpretation of Tool:  Represents activities that are increasingly more difficult (i.e. Bed mobility, Transfers, Gait). Medical Necessity:     · Patient is expected to demonstrate progress in   · strength, range of motion, balance, coordination, and functional technique  ·  to   · increase independence with all mobility. · .  Reason for Services/Other Comments:  · Patient continues to require skilled intervention due to   · medical complications and mobility deficits which impact his level of function, safety, and independence as indicated above.    · .   Use of outcome tool(s) and clinical judgement create a POC that gives a: Questionable prediction of patient's progress: MODERATE COMPLEXITY TREATMENT:      Pre-treatment Symptoms/Complaints:  none  Pain: Initial: None Post Session:  None     Gait Training (  15 minutes):  Gait training to improve and/or restore physical functioning as related to mobility, strength, balance and dynamic movement of knee - right to improve functional positioning and swing phase of RLE. Ambulated 10 Feet (ft)(x 2) with Moderate assistance using a Walker luke and moderate Manual cues; Safety awareness training;Verbal cues; Tactile cues related to their knee positioning, ankle positioning (ace wrap), sequencing, luke walker use, foot placement, and posture to promote proper body alignment, promote proper body posture and promote proper body mechanics. Instruction in performance of the above deficits to correct overall gait quality and functional mobility. Neuromuscular Re-education: ( 0 Minutes):  Exercise/activities including sitting/standing balance, posture, and weight shifts in sitting/standing below to improve balance, kinesthetic sense, posture and proprioception. Required moderate visual, verbal, manual and tactile cues to promote static and dynamic balance in standing and promote motor control of bilateral, lower extremity(s). Therapeutic Activity: (    8 minutes): Therapeutic activities including Bed transfers, Chair transfers and sitting balance activities to improve mobility, strength, balance and coordination. Required minimal Manual cues; Safety awareness training;Verbal cues; Tactile cues to promote dynamic balance in sitting and promote coordination of left, lower extremity(s).              Braces/Orthotics/Lines/Etc:   · O2 Device: Room Air   Treatment/Session Assessment:    · Response to Treatment:  See above  · Interdisciplinary Collaboration:   o Physical Therapy Assistant  o Registered Nurse  · After treatment position/precautions:   o Supine in bed  o Bed alarm/tab alert on  o Bed/Chair-wheels locked  o Bed in low position  o Call light within reach  o RN notified   · Compliance with Program/Exercises: Compliant all of the time  · Recommendations/Intent for next treatment session: \"Next visit will focus on advancements to more challenging activities and reduction in assistance provided\".     Total Treatment Duration:  PT Patient Time In/Time Out  Time In: 1318  Time Out: [address], PTA

## 2020-02-05 NOTE — PROGRESS NOTES
02/05/20 0400   NIH Stroke Scale   Interval Other (comment)  (Neuro checks)   LOC 0   LOC Questions 0   LOC Commands 0   Motor Right Arm 0   Motor Left Arm 2   Motor Right Leg 0   Motor Left Leg 2   Dysarthria 1   Neuro checks

## 2020-02-05 NOTE — PROGRESS NOTES
Hospitalist Progress Note     Admit Date:  2019  9:07 AM   Name:  Chel Wood   Age:  53 y.o.  :  1975   MRN:  [id number]   PCP:  Naun Quezada MD  Treatment Team: Attending Provider: Nestor Biggs MD; Care Manager: Marlee Bonilla, RN; Care Manager: Dwight Yanez, [facility]; Consulting Provider: Ayana Simmons MD; Utilization Review: Bala Donnelly RN; Nurse Practitioner: Vee Montes NP; Primary Nurse: Jessy Ohara Speech Language Pathologist: Nikki Cabrera, SLP; Physical Therapy Assistant: Tk Benjamin; Charge Nurse: Silvana Colvin    Subjective: This is a 52 yo male with PMH of DM II, HTN who presented 19 with c/o left leg and arm weakness, left facial droop and dysarthria.  Code S called. MRI with acute infarctions in left cerebellar hemisphere, deep frontoparietal white matter and right paramedian yariel.  Also noted to have old lacunar infarcts. CTA without large vessel occlusions, however some stenosis noted.  Echo showed PFO, Cardiology to f/u outpatient for evaluation for closure.  Neurology consulted.  Started on ASA, statin.  Pt is uninsured, difficult placement, case management working on plan.        2/ patient seen and examined at bedside. No complaints today. Blood work reviewed and was noted to be normal. Patient informed    Nursing notes and chart reviewed. Review of Systems negative with exception of pertinent positives noted above.       Objective:     Patient Vitals for the past 24 hrs:   Temp Pulse Resp BP SpO2   20 0800 97.3 °F (36.3 °C) 81 17 124/82 97 %   20 0400 98 °F (36.7 °C) 89 16 140/81 98 %   20 0000 98.4 °F (36.9 °C) 72 16 151/84 97 %   20 2000 98 °F (36.7 °C) 63 18 146/88 99 %   20 1600 98.4 °F (36.9 °C) (!) 59 17 132/80 94 %   20 1200 98.1 °F (36.7 °C) 69 17 144/86 97 %     Oxygen Therapy  O2 Sat (%): 97 % (20 0800)  Pulse via Oximetry: 66 beats per minute (20 0000)  O2 Device: Room air (02/01/20 0000)    Intake/Output Summary (Last 24 hours) at 2/5/2020 1136  Last data filed at 2/5/2020 0804  Gross per 24 hour   Intake 180 ml   Output    Net 180 ml         General:    Well nourished. Alert. CV:   RRR. No murmur, rub, or gallop. Lungs:   CTAB. No wheezing, rhonchi, or rales. Abdomen:   Soft, nontender, nondistended. Extremities: Warm and dry. No cyanosis or edema. Skin:     No rashes or jaundice. Neuro:  LUE in sling. Able to move fingers. LLE able to move slightly against gravity    Data Review:  I have reviewed all labs, meds, telemetry events, and studies from the last 24 hours. Recent Results (from the past 24 hour(s))   CBC WITH AUTOMATED DIFF    Collection Time: 02/05/20  5:05 AM   Result Value Ref Range    WBC 4.9 4.3 - 11.1 K/uL    RBC 4.56 4.23 - 5.6 M/uL    HGB 11.8 (L) 13.6 - 17.2 g/dL    HCT 37.8 (L) 41.1 - 50.3 %    MCV 82.9 79.6 - 97.8 FL    MCH 25.9 (L) 26.1 - 32.9 PG    MCHC 31.2 (L) 31.4 - 35.0 g/dL    RDW 17.3 (H) 11.9 - 14.6 %    PLATELET 670 331 - 640 K/uL    MPV 11.6 9.4 - 12.3 FL    ABSOLUTE NRBC 0.00 0.0 - 0.2 K/uL    DF AUTOMATED      NEUTROPHILS 52 43 - 78 %    LYMPHOCYTES 39 13 - 44 %    MONOCYTES 6 4.0 - 12.0 %    EOSINOPHILS 1 0.5 - 7.8 %    BASOPHILS 1 0.0 - 2.0 %    IMMATURE GRANULOCYTES 0 0.0 - 5.0 %    ABS. NEUTROPHILS 2.6 1.7 - 8.2 K/UL    ABS. LYMPHOCYTES 1.9 0.5 - 4.6 K/UL    ABS. MONOCYTES 0.3 0.1 - 1.3 K/UL    ABS. EOSINOPHILS 0.1 0.0 - 0.8 K/UL    ABS. BASOPHILS 0.0 0.0 - 0.2 K/UL    ABS. IMM.  GRANS. 0.0 0.0 - 0.5 K/UL   METABOLIC PANEL, BASIC    Collection Time: 02/05/20  5:05 AM   Result Value Ref Range    Sodium 143 136 - 145 mmol/L    Potassium 3.7 3.5 - 5.1 mmol/L    Chloride 111 (H) 98 - 107 mmol/L    CO2 25 21 - 32 mmol/L    Anion gap 7 7 - 16 mmol/L    Glucose 73 65 - 100 mg/dL    BUN 11 6 - 23 MG/DL    Creatinine 1.05 0.8 - 1.5 MG/DL    GFR est AA >60 >60 ml/min/1.73m2    GFR est non-AA >60 >60 ml/min/1.73m2    Calcium 9.6 8.3 - 10.4 MG/DL   MAGNESIUM    Collection Time: 02/05/20  5:05 AM   Result Value Ref Range    Magnesium 1.5 (L) 1.8 - 2.4 mg/dL   GLUCOSE, POC    Collection Time: 02/05/20  7:29 AM   Result Value Ref Range    Glucose (POC) 95 65 - 100 mg/dL        All Micro Results     None          Current Meds:  Current Facility-Administered Medications   Medication Dose Route Frequency    lip protectant (BLISTEX) ointment 1 Each  1 Each Topical PRN    acetaminophen (TYLENOL) tablet 650 mg  650 mg Oral Q6H    metFORMIN (GLUCOPHAGE) tablet 500 mg  500 mg Oral BID WITH MEALS    metoprolol succinate (TOPROL-XL) XL tablet 25 mg  25 mg Oral DAILY    polyethylene glycol (MIRALAX) packet 17 g  17 g Oral DAILY PRN    guaiFENesin (ROBITUSSIN) 100 mg/5 mL oral liquid 100 mg  100 mg Oral Q4H PRN    hydrALAZINE (APRESOLINE) 20 mg/mL injection 20 mg  20 mg IntraVENous Q4H PRN    atorvastatin (LIPITOR) tablet 80 mg  80 mg Oral QHS    lisinopril (PRINIVIL, ZESTRIL) tablet 40 mg  40 mg Oral DAILY    sodium chloride (NS) flush 5-40 mL  5-40 mL IntraVENous PRN    ondansetron (ZOFRAN) injection 4 mg  4 mg IntraVENous Q4H PRN    aspirin chewable tablet 81 mg  81 mg Oral DAILY    enoxaparin (LOVENOX) injection 40 mg  40 mg SubCUTAneous Q24H    dextrose 40% (GLUTOSE) oral gel 1 Tube  15 g Oral PRN    glucagon (GLUCAGEN) injection 1 mg  1 mg IntraMUSCular PRN    dextrose (D50W) injection syrg 12.5-25 g  25-50 mL IntraVENous PRN       Other Studies (last 24 hours):  No results found.     Assessment and Plan:     Hospital Problems as of 2/5/2020 Date Reviewed: 3/3/2017          Codes Class Noted - Resolved POA    PFO (patent foramen ovale) ICD-10-CM: Q21.1  ICD-9-CM: 745.5  12/17/2019 - Present Yes        Arrhythmia ICD-10-CM: I49.9  ICD-9-CM: 427.9  12/15/2019 - Present Yes        Hyperlipemia ICD-10-CM: E78.5  ICD-9-CM: 272.4  12/15/2019 - Present Yes        * (Principal) Acute embolic stroke ([address] [id number]: I63.9  ICD-9-CM: 434.11 12/12/2019 - Present Yes        Hypertension (Chronic) ICD-10-CM: I10  ICD-9-CM: 401.9  Unknown - Present Yes        Type 2 diabetes mellitus (HCC) (Chronic) ICD-10-CM: E11.9  ICD-9-CM: 250.00  Unknown - Present Yes              PLAN:      Acute embolic stroke: POA  -Dysphagia   -MRI head: with acute infarcts in L cerebellar hemisphere, deep frontoparietal white matter, and R paramedian yariel.  Also noted old lacunar infarcts. -ISMAEL: 3 mm PFO, Cardiology stated he needs an evaluation for closure PFO with high risk features including significant (3 mm) separation of septum secundum and primum as well as large shunt.  Will await recovery of CVA.  Plan 4 week event monitor at discharge.  Will then see in offfice and if no arrythmias will plan PFO closure  -statin, ASA  -PT/OT/Speech   -will check labs periodicly as last check was 2/5 and was unremarkable     Hypertension: continue home meds      Type 2 diabetes mellitus:  Metformin     Left shoulder pain, resolved (2/2)  -Tylenol every 6 hours scheduled as patient is frequently asking for it for left shoulder pain.       Arrhythmia: had brief wide complex tachycardia early in admission, will need event monitor as outpt per cards     DVT Prophylaxis: lovenox  DISPO: Difficult placement, case management working on plan. Patient has no medical insurance. Pt can't go home as pt's wife is disabled and is not safe at home without supervision as pt needs long term care.     Signed:  Charlie Arrieta MD

## 2020-02-05 NOTE — PROGRESS NOTES
02/04/20 1908   NIH Stroke Scale   Interval Other (comment)   LOC 0   LOC Questions 0   LOC Commands 0   Best Gaze 0   Visual 0   Facial Palsy 1   Motor Right Arm 0   Motor Left Arm 2   Motor Right Leg 0   Motor Left Leg 2   Limb Ataxia 0   Sensory 0   Best Language 1   Dysarthria 1   Extinction and Inattention 0   Total 7

## 2020-02-06 LAB — GLUCOSE BLD STRIP.AUTO-MCNC: 93 MG/DL (ref 65–100)

## 2020-02-06 PROCEDURE — 74011250636 HC RX REV CODE- 250/636: Performed by: INTERNAL MEDICINE

## 2020-02-06 PROCEDURE — 97116 GAIT TRAINING THERAPY: CPT

## 2020-02-06 PROCEDURE — 74011250637 HC RX REV CODE- 250/637: Performed by: INTERNAL MEDICINE

## 2020-02-06 PROCEDURE — 65270000029 HC RM PRIVATE

## 2020-02-06 PROCEDURE — 97110 THERAPEUTIC EXERCISES: CPT

## 2020-02-06 PROCEDURE — 74011250637 HC RX REV CODE- 250/637: Performed by: HOSPITALIST

## 2020-02-06 PROCEDURE — 82962 GLUCOSE BLOOD TEST: CPT

## 2020-02-06 RX ADMIN — GUAIFENESIN 100 MG: 100 SOLUTION ORAL at 17:01

## 2020-02-06 RX ADMIN — METFORMIN HYDROCHLORIDE 500 MG: 500 TABLET, FILM COATED ORAL at 16:57

## 2020-02-06 RX ADMIN — LISINOPRIL 40 MG: 20 TABLET ORAL at 09:38

## 2020-02-06 RX ADMIN — ACETAMINOPHEN 650 MG: 325 TABLET, FILM COATED ORAL at 05:55

## 2020-02-06 RX ADMIN — ENOXAPARIN SODIUM 40 MG: 40 INJECTION SUBCUTANEOUS at 14:17

## 2020-02-06 RX ADMIN — METOPROLOL SUCCINATE 25 MG: 25 TABLET, FILM COATED, EXTENDED RELEASE ORAL at 09:38

## 2020-02-06 RX ADMIN — ACETAMINOPHEN 650 MG: 325 TABLET, FILM COATED ORAL at 11:46

## 2020-02-06 RX ADMIN — ACETAMINOPHEN 650 MG: 325 TABLET, FILM COATED ORAL at 22:47

## 2020-02-06 RX ADMIN — METFORMIN HYDROCHLORIDE 500 MG: 500 TABLET, FILM COATED ORAL at 09:38

## 2020-02-06 RX ADMIN — ACETAMINOPHEN 650 MG: 325 TABLET, FILM COATED ORAL at 16:58

## 2020-02-06 RX ADMIN — ASPIRIN 81 MG 81 MG: 81 TABLET ORAL at 09:38

## 2020-02-06 RX ADMIN — ATORVASTATIN CALCIUM 80 MG: 80 TABLET, FILM COATED ORAL at 21:42

## 2020-02-06 NOTE — PROGRESS NOTES
Hospitalist Progress Note     Admit Date:  2019  9:07 AM   Name:  Gifty Johnson   Age:  53 y.o.  :  1975   MRN:  [id number]   PCP:  Joss Chiang MD  Treatment Team: Attending Provider: Melodie Valdovinos MD; Care Manager: Nori Alamo RN; Care Manager: Mamie Wiley, [facility] [place]; Consulting Provider: Suzanne Tao MD; Utilization Review: Richi Srinivasan RN; Nurse Practitioner: Nato Newell NP; Speech Language Pathologist: Olaf Pang, [facility]; Primary Nurse: Clive Moise; Occupational Therapist: Sam Muir OT; Physical Therapy Assistant: Daniel Mata PTA    Subjective: This is a 52 yo male with PMH of DM II, HTN who presented 19 with c/o left leg and arm weakness, left facial droop and dysarthria.  Code S called. MRI with acute infarctions in left cerebellar hemisphere, deep frontoparietal white matter and right paramedian yariel.  Also noted to have old lacunar infarcts. CTA without large vessel occlusions, however some stenosis noted.  Echo showed PFO, Cardiology to f/u outpatient for evaluation for closure.  Neurology consulted.  Started on ASA, statin.  Pt is uninsured, difficult placement, case management working on plan.      2/ patient seen and examined at bedside. No complaints today. No overnight events. Neuro checks every 4 hours were discontinued. Nursing notes and chart reviewed. Review of Systems negative with exception of pertinent positives noted above.       Objective:     Patient Vitals for the past 24 hrs:   Temp Pulse Resp BP SpO2   20 0800 98 °F (36.7 °C) 60 19 134/85 99 %   20 0400 97.9 °F (36.6 °C) 71 19 118/83 99 %   20 0000 98.3 °F (36.8 °C) 72 16 143/89 98 %   20 2000 98.3 °F (36.8 °C) (!) 59 16 146/88 97 %   20 1600 97.7 °F (36.5 °C) 85 17 135/86 97 %   20 1200 97.6 °F (36.4 °C) 85 18 132/79 96 %     Oxygen Therapy  O2 Sat (%): 99 % (20 0800)  Pulse via Oximetry: 66 beats per minute (20 0000)  O2 Device: Room air (02/01/20 0000)    Intake/Output Summary (Last 24 hours) at 2/6/2020 1023  Last data filed at 2/5/2020 1203  Gross per 24 hour   Intake 110 ml   Output    Net 110 ml         General:    Well nourished. Alert. Able to move left hand/fingers, can only move left leg from the hip/knee slightly. CV:   RRR. No murmur, rub, or gallop. Lungs:   CTAB. No wheezing, rhonchi, or rales. Abdomen:   Soft, nontender, nondistended. Extremities: Warm and dry. No cyanosis or edema. Skin:     No rashes or jaundice. Data Review:  I have reviewed all labs, meds, telemetry events, and studies from the last 24 hours.     Recent Results (from the past 24 hour(s))   GLUCOSE, POC    Collection Time: 02/06/20  8:09 AM   Result Value Ref Range    Glucose (POC) 93 65 - 100 mg/dL        All Micro Results     None          Current Meds:  Current Facility-Administered Medications   Medication Dose Route Frequency    lip protectant (BLISTEX) ointment 1 Each  1 Each Topical PRN    acetaminophen (TYLENOL) tablet 650 mg  650 mg Oral Q6H    metFORMIN (GLUCOPHAGE) tablet 500 mg  500 mg Oral BID WITH MEALS    metoprolol succinate (TOPROL-XL) XL tablet 25 mg  25 mg Oral DAILY    polyethylene glycol (MIRALAX) packet 17 g  17 g Oral DAILY PRN    guaiFENesin (ROBITUSSIN) 100 mg/5 mL oral liquid 100 mg  100 mg Oral Q4H PRN    hydrALAZINE (APRESOLINE) 20 mg/mL injection 20 mg  20 mg IntraVENous Q4H PRN    atorvastatin (LIPITOR) tablet 80 mg  80 mg Oral QHS    lisinopril (PRINIVIL, ZESTRIL) tablet 40 mg  40 mg Oral DAILY    sodium chloride (NS) flush 5-40 mL  5-40 mL IntraVENous PRN    ondansetron (ZOFRAN) injection 4 mg  4 mg IntraVENous Q4H PRN    aspirin chewable tablet 81 mg  81 mg Oral DAILY    enoxaparin (LOVENOX) injection 40 mg  40 mg SubCUTAneous Q24H    dextrose 40% (GLUTOSE) oral gel 1 Tube  15 g Oral PRN    glucagon (GLUCAGEN) injection 1 mg  1 mg IntraMUSCular PRN    dextrose (D50W) injection syrg 12.5-25 g  25-50 mL IntraVENous PRN       Other Studies (last 24 hours):  No results found. Assessment and Plan:     Hospital Problems as of 2/6/2020 Date Reviewed: 3/3/2017          Codes Class Noted - Resolved POA    PFO (patent foramen ovale) ICD-10-CM: Q21.1  ICD-9-CM: 745.5  12/17/2019 - Present Yes        Arrhythmia ICD-10-CM: I49.9  ICD-9-CM: 427.9  12/15/2019 - Present Yes        Hyperlipemia ICD-10-CM: E78.5  ICD-9-CM: 272.4  12/15/2019 - Present Yes        * (Principal) Acute embolic stroke [facility]) [id number]: I63.9  ICD-9-CM: 434.11  12/12/2019 - Present Yes        Hypertension (Chronic) ICD-10-CM: I10  ICD-9-CM: 401.9  Unknown - Present Yes        Type 2 diabetes mellitus (HCC) (Chronic) ICD-10-CM: E11.9  ICD-9-CM: 250.00  Unknown - Present Yes              PLAN:    Acute embolic stroke: POA  -Dysphagia   -MRI head: with acute infarcts in L cerebellar hemisphere, deep frontoparietal white matter, and R paramedian yariel.  Also noted old lacunar infarcts. -ISMAEL: 3 mm PFO, Cardiology stated he needs an evaluation for closure PFO with high risk features including significant (3 mm) separation of septum secundum and primum as well as large shunt.  Will await recovery of CVA.  Plan 4 week event monitor at discharge.  Will then see in offfice and if no arrythmias will plan PFO closure  -statin, ASA  -PT/OT/Speech   -will check labs periodicly as last check was 2/5 and was unremarkable     Hypertension: continue home meds      Type 2 diabetes mellitus:  Metformin      Left shoulder pain, resolved (2/2)  -Tylenol every 6 hours scheduled as patient is frequently asking for it for left shoulder pain.        Arrhythmia: had brief wide complex tachycardia early in admission, will need event monitor as outpt per cards     DVT Prophylaxis: lovenox  DISPO: Difficult placement, case management working on plan. Patient has no medical insurance.  Can't go home as wife is disabled and is not safe at home without supervision as he needs long term care.       Signed:  Richy Garza MD

## 2020-02-06 NOTE — PROGRESS NOTES
Problem: Mobility Impaired (Adult and Pediatric)  Goal: *Acute Goals and Plan of Care  Description  Acute PT Goals (reviewed & updated 1/29/2020):  STG:  (1.) Mr. Shanell Cueva will perform bed mobility with MINIMAL ASSISTANCE within 3 treatment day(s). (2.) Mr. Shanell Cueva will demonstrate good dynamic sitting balance within 3 treatment days. (3.) Mr. Shanell Cueva will transfer sit to stand with MINIMAL ASSISTANCE X1 within 3 treatment days. (4.)Mr. Dinorah Avitia will transfer from bed to chair and chair to bed with MODERATE ASSIST x1 using the least restrictive device within 3 treatment day(s). LTG:  (1.) Mr. Shanell Cueva will perform bed mobility with CONTACT GUARD ASSISTANCE within 7 treatment day(s). (2.) Mr. Shanell Cueva will transfer from bed to chair and chair to bed with MINIMAL ASSIST using the least restrictive device within 7 treatment day(s). (3.) Mr. Shanell Cueva will ambulate with MODERATE ASSIST for 5+ feet with the least restrictive device to assist with transfers within 7 treatment day(s). (4.) Mr. Shanell Cueva will perform standing static and dynamic balance activities x 10 minutes with MINIMAL ASSIST to improve safety within 7 treatment day(s). (5.)Mr. Shanell Cueva will demonstrate FAIR DYNAMIC STANDING balance within 7 treatment days. (6.) Mr. Shanell Cueva will tolerate 25+ minutes of neuromuscular re-education and/or therapeutic activity/exercise while maintaining stable vitals within 7 treatment day(s).      PHYSICAL THERAPY: Daily Note and PM 2/6/2020  INPATIENT: PT Visit Days : 6  Payor: MEDICAID PENDING / Plan: [place] PENDING / Product Type: Medicaid /       NAME/AGE/GENDER: Hazel Waldron is a 53 y.o. male   PRIMARY DIAGNOSIS: Acute ischemic stroke ([address] [I63.9]  Cerebrovascular accident (CVA) due to embolism ([address] [Y13.8] Acute embolic stroke ([address] Acute embolic stroke ([address]       ICD-10: Treatment Diagnosis:   · Other lack of cordination (R27.8)  · Difficulty in walking, Not elsewhere classified (R26.2)  · Other abnormalities of gait and mobility (R26.89)  · Unspecified Lack of Coordination (R27.9)  · Hemiplegia and hemiparesis following cerebral infarction affecting   · left non-dominant side (H13.795)   Precaution/Allergies:  Patient has no known allergies. ASSESSMENT:     Mr. Antony Murray is a 53year old admitted with acute CVA with left hemiparesis and left inattention. Patient was supine upon contact and agreeable to PT. Patient performs supine to sit with mod assist, additional time, and cues to utilize compensatory strategies to assist with bed mobility. Pt stood with min assist and was able to ambulate about 15\" using luke walker and mod assist and ace wrap to left ankle for DF. Pt requires verbal and manual cues to provide increased knee stability, increased step length L, walker placement, and improved step length R LE. Pt rested in chair and then ambulated back to bed, 15\" with same assist.  Pt getting fatigued at end of ambulation. Pt returned supine and performed AAROM ex with left LE. He was left with needs in reach. Pt is making progress towards goals with improved ambulation. Will continue efforts. This section established at most recent assessment   PROBLEM LIST (Impairments causing functional limitations):  1. Decreased Strength  2. Decreased ADL/Functional Activities  3. Decreased Transfer Abilities  4. Decreased Ambulation Ability/Technique  5. Decreased Balance  6. Increased Pain  7. Decreased Activity Tolerance  8. Increased Fatigue  9. Decreased Flexibility/Joint Mobility   INTERVENTIONS PLANNED: (Benefits and precautions of physical therapy have been discussed with the patient.)  1. Balance Exercise  2. Bed Mobility  3. Family Education  4. Gait Training  5. Home Exercise Program (HEP)  6. Manual Therapy  7. Neuromuscular Re-education/Strengthening  8. Range of Motion (ROM)  9. Therapeutic Activites  10. Therapeutic Exercise/Strengthening  11.  Transfer Training TREATMENT PLAN: Frequency/Duration: 3 times a week for duration of hospital stay  Rehabilitation Potential For Stated Goals: Good     REHAB RECOMMENDATIONS (at time of discharge pending progress):    Placement: It is my opinion, based on this patient's performance to date, that Mr. Antony Murray may benefit from intensive therapy at an [address] [place] [address] after discharge due to a probable need for 24 hour rehab nursing, a probable need for multiple therapy disciplines, and potential to make ongoing and sustainable functional improvement that is of practical value. .  Equipment:    TBD pending progress with therapy. HISTORY:   History of Present Injury/Illness (Reason for Referral):  Per H&P: \"Pt is a 52 y/o smoker with DM, HTN, who presented to ER with L leg and arm weakness, L facial droop, dysarthria. First noted L leg weakness late night 12/11 when he woke up to go to the bathroom. He was normal when he went to bed around 1030. Woke up this morning and had persistent weakness L leg and also now noted in L arm. EMS called. Noted to have slurred speech and L facial droop as well. Code S called in ER around 9am.  MRI with acute infarcts in L cerebellar hemisphere, deep frontoparietal white matter, and R paramedian yariel. Also noted old lacunar infarcts. No large vessel occlusion on CTA, but some stenosis noted. No hx afib, TIA, CVA. No CP, palpitations, SOB. \"  Past Medical History/Comorbidities:   Mr. Antony Murray  has a past medical history of Acute ischemic stroke ([address] (12/12/2019), Acute pancreatitis (11/19/2014), Cerebrovascular accident (CVA) due to embolism ([address] (12/12/2019), Diabetes ([address] (2002), Diabetes ([address] Diabetes mellitus, and Hypertension.  He also has no past medical history of Arthritis, Asthma, Autoimmune disease ([address] CAD (coronary artery disease), Cancer ([address] Chronic kidney disease, COPD, Dementia, Dementia ([address] Heart failure ([address] Ill-defined condition, Infectious disease, Liver disease, Other ill-defined conditions(799.89), Psychiatric disorder, PUD (peptic ulcer disease), Seizures ([address] or Sleep disorder. Mr. Clovis Jordan  has a past surgical history that includes hx hernia repair and hx orthopaedic. Social History/Living Environment:   Home Environment: Apartment  # Steps to Enter: 12  Rails to Enter: Yes  [employer] : Bilateral  One/Two Story Residence: One story  Living Alone: No  Support Systems: Spouse/Significant Other/Partner  Patient Expects to be Discharged to[de-identified] Unknown  Current DME Used/Available at Home: None  Tub or Shower Type: Tub/Shower combination  Prior Level of Function/Work/Activity:  Independent, lives with wife in 2nd story 1 level apartment. No recent falls. Number of Personal Factors/Comorbidities that affect the Plan of Care: 1-2: MODERATE COMPLEXITY   EXAMINATION:   Most Recent Physical Functioning:   Gross Assessment:                  Posture:     Balance:  Sitting: Impaired  Sitting - Static: Fair (occasional)  Sitting - Dynamic: Fair (occasional)  Standing: Impaired  Standing - Static: Poor;Occasional  Standing - Dynamic : Poor Bed Mobility:  Rolling: Minimum assistance  Supine to Sit: Moderate assistance  Sit to Supine: Moderate assistance  Scooting: Moderate assistance  Wheelchair Mobility:     Transfers:  Sit to Stand: Minimum assistance  Stand to Sit: Moderate assistance  Bed to Chair: Moderate assistance  Gait:     Base of Support: Narrowed  Speed/Clotilde: Delayed  Step Length: Left shortened  Stance: Left decreased  Gait Abnormalities: Circumduction;Decreased step clearance  Distance (ft): 15 Feet (ft)(x 2)  Assistive Device: Gait belt;Brace/Splint; Walker luke(used ace wrap for left DF)  Ambulation - Level of Assistance: Moderate assistance  Interventions: Safety awareness training;Manual cues; Tactile cues; Verbal cues      Body Structures Involved:  1. Nerves  2. Voice/Speech  3. Bones  4. Joints  5.  Muscles Body Functions Affected:  1. Mental  2. Sensory/Pain  3. Neuromusculoskeletal  4. Movement Related Activities and Participation Affected:  1. General Tasks and Demands  2. Mobility  3. Self Care  4. Interpersonal Interactions and Relationships   Number of elements that affect the Plan of Care: 4+: HIGH COMPLEXITY   CLINICAL PRESENTATION:   Presentation: Evolving clinical presentation with changing clinical characteristics: MODERATE COMPLEXITY   CLINICAL DECISION MAKIN [facility] Mobility Inpatient Short Form  How much difficulty does the patient currently have. .. Unable A Lot A Little None   1. Turning over in bed (including adjusting bedclothes, sheets and blankets)? [] 1   [] 2   [x] 3   [] 4   2. Sitting down on and standing up from a chair with arms ( e.g., wheelchair, bedside commode, etc.)   [] 1   [x] 2   [] 3   [] 4   3. Moving from lying on back to sitting on the side of the bed? [] 1   [] 2   [x] 3   [] 4   How much help from another person does the patient currently need. .. Total A Lot A Little None   4. Moving to and from a bed to a chair (including a wheelchair)? [] 1   [x] 2   [] 3   [] 4   5. Need to walk in hospital room? [] 1   [x] 2   [] 3   [] 4   6. Climbing 3-5 steps with a railing? [] 1   [x] 2   [] 3   [] 4   © 2007, Trustees of [address] [place] [address], under license to [employer]. All rights reserved      Score:  Initial: 13 Most Recent: 14 (Date:2020)    Interpretation of Tool:  Represents activities that are increasingly more difficult (i.e. Bed mobility, Transfers, Gait). Medical Necessity:     · Patient is expected to demonstrate progress in   · strength, range of motion, balance, coordination, and functional technique  ·  to   · increase independence with all mobility.    · .  Reason for Services/Other Comments:  · Patient continues to require skilled intervention due to   · medical complications and mobility deficits which impact his level of function, safety, and independence as indicated above. · .   Use of outcome tool(s) and clinical judgement create a POC that gives a: Questionable prediction of patient's progress: MODERATE COMPLEXITY        TREATMENT:      Pre-treatment Symptoms/Complaints:  none  Pain: Initial: None Post Session:  None     Gait Training (  15 minutes):  Gait training to improve and/or restore physical functioning as related to mobility, strength, balance and dynamic movement of knee - right to improve functional positioning and swing phase of RLE. Ambulated 15 Feet (ft)(x 2) with Moderate assistance using a Gait belt;Brace/Splint; Walker luke(used ace wrap for left DF) and moderate Safety awareness training;Manual cues; Tactile cues; Verbal cues related to their knee positioning, ankle positioning (ace wrap), sequencing, luke walker use, foot placement, and posture to promote proper body alignment, promote proper body posture and promote proper body mechanics. Instruction in performance of the above deficits to correct overall gait quality and functional mobility. Neuromuscular Re-education: ( 0 Minutes):  Exercise/activities including sitting/standing balance, posture, and weight shifts in sitting/standing below to improve balance, kinesthetic sense, posture and proprioception. Required moderate visual, verbal, manual and tactile cues to promote static and dynamic balance in standing and promote motor control of bilateral, lower extremity(s). Therapeutic Activity: (    8 minutes): Therapeutic activities including Bed transfers, Chair transfers and sitting balance activities to improve mobility, strength, balance and coordination. Required minimal Safety awareness training;Manual cues; Tactile cues; Verbal cues to promote dynamic balance in sitting and promote coordination of left, lower extremity(s). Braces/Orthotics/Lines/Etc:   · Used ace wrap for DF on left LE.   Treatment/Session Assessment:    · Response to Treatment:  See above  · Interdisciplinary Collaboration:   o Physical Therapy Assistant  o Registered Nurse  o Certified Nursing Assistant/Patient Care Technician  · After treatment position/precautions:   o Supine in bed  o Bed alarm/tab alert on  o Bed/Chair-wheels locked  o Bed in low position  o Call light within reach   · Compliance with Program/Exercises: Compliant all of the time  · Recommendations/Intent for next treatment session: \"Next visit will focus on advancements to more challenging activities and reduction in assistance provided\".     Total Treatment Duration:  PT Patient Time In/Time Out  Time In: 0906  Time Out: [address], PTA

## 2020-02-06 NOTE — PROGRESS NOTES
Problem: Patient Education: Go to Patient Education Activity  Goal: Patient/Family Education  Outcome: Progressing Towards Goal     Problem: Pressure Injury - Risk of  Goal: *Prevention of pressure injury  Description  Document Dewey Scale and appropriate interventions in the flowsheet. Outcome: Progressing Towards Goal  Note: Pressure Injury Interventions:  Sensory Interventions: Assess changes in LOC    Moisture Interventions: Apply protective barrier, creams and emollients, Check for incontinence Q2 hours and as needed, Limit adult briefs, Maintain skin hydration (lotion/cream), Minimize layers, Moisture barrier, Offer toileting Q_hr    Activity Interventions: Increase time out of bed, Pressure redistribution bed/mattress(bed type), PT/OT evaluation    Mobility Interventions: HOB 30 degrees or less, Pressure redistribution bed/mattress (bed type), PT/OT evaluation, Turn and reposition approx. every two hours(pillow and wedges)    Nutrition Interventions: Document food/fluid/supplement intake    Friction and Shear Interventions: Apply protective barrier, creams and emollients, Foam dressings/transparent film/skin sealants, Lift sheet, Transferring/repositioning devices, Minimize layers                Problem: Patient Education: Go to Patient Education Activity  Goal: Patient/Family Education  Outcome: Progressing Towards Goal     Problem: Falls - Risk of  Goal: *Absence of Falls  Description  Document [place] Fall Risk and appropriate interventions in the flowsheet.   Outcome: Progressing Towards Goal  Note: Fall Risk Interventions:  Mobility Interventions: Bed/chair exit alarm, Communicate number of staff needed for ambulation/transfer, Patient to call before getting OOB, Utilize walker, cane, or other assistive device    Mentation Interventions: Bed/chair exit alarm    Medication Interventions: Bed/chair exit alarm, Patient to call before getting OOB, Teach patient to arise slowly    Elimination Interventions: Bed/chair exit alarm, Call light in reach, Patient to call for help with toileting needs, Urinal in reach, Toileting schedule/hourly rounds, Toilet paper/wipes in reach    History of Falls Interventions: Bed/chair exit alarm, Door open when patient unattended         Problem: Patient Education: Go to Patient Education Activity  Goal: Patient/Family Education  Outcome: Progressing Towards Goal     Problem: Diabetes Self-Management  Goal: *Disease process and treatment process  Description  Define diabetes and identify own type of diabetes; list 3 options for treating diabetes. Outcome: Progressing Towards Goal  Goal: *Incorporating nutritional management into lifestyle  Description  Describe effect of type, amount and timing of food on blood glucose; list 3 methods for planning meals. Outcome: Progressing Towards Goal  Goal: *Incorporating physical activity into lifestyle  Description  State effect of exercise on blood glucose levels. Outcome: Progressing Towards Goal  Goal: *Developing strategies to promote health/change behavior  Description  Define the ABC's of diabetes; identify appropriate screenings, schedule and personal plan for screenings. Outcome: Progressing Towards Goal  Goal: *Using medications safely  Description  State effect of diabetes medications on diabetes; name diabetes medication taking, action and side effects. Outcome: Progressing Towards Goal  Goal: *Monitoring blood glucose, interpreting and using results  Description  Identify recommended blood glucose targets  and personal targets. Outcome: Progressing Towards Goal  Goal: *Prevention, detection, treatment of acute complications  Description  List symptoms of hyper- and hypoglycemia; describe how to treat low blood sugar and actions for lowering  high blood glucose level.   Outcome: Progressing Towards Goal  Goal: *Prevention, detection and treatment of chronic complications  Description  Define the natural course of diabetes and describe the relationship of blood glucose levels to long term complications of diabetes.   Outcome: Progressing Towards Goal  Goal: *Developing strategies to address psychosocial issues  Description  Describe feelings about living with diabetes; identify support needed and support network  Outcome: Progressing Towards Goal     Problem: Patient Education: Go to Patient Education Activity  Goal: Patient/Family Education  Outcome: Progressing Towards Goal     Problem: Patient Education: Go to Patient Education Activity  Goal: Patient/Family Education  Outcome: Progressing Towards Goal     Problem: General Medical Care Plan  Goal: *Vital signs within specified parameters  Outcome: Progressing Towards Goal  Goal: *Labs within defined limits  Outcome: Progressing Towards Goal  Goal: *Absence of infection signs and symptoms  Description  Wash hand more often   Outcome: Progressing Towards Goal  Goal: *Optimal pain control at patient's stated goal  Outcome: Progressing Towards Goal  Goal: *Skin integrity maintained  Outcome: Progressing Towards Goal  Goal: *Fluid volume balance  Outcome: Progressing Towards Goal  Goal: *Optimize nutritional status  Outcome: Progressing Towards Goal  Goal: *Anxiety reduced or absent  Outcome: Progressing Towards Goal  Goal: *Progressive mobility and function (eg: ADL's)  Outcome: Progressing Towards Goal     Problem: Patient Education: Go to Patient Education Activity  Goal: Patient/Family Education  Outcome: Progressing Towards Goal     Problem: Patient Education: Go to Patient Education Activity  Goal: Patient/Family Education  Outcome: Progressing Towards Goal     Problem: Patient Education: Go to Patient Education Activity  Goal: Patient/Family Education  Outcome: Progressing Towards Goal     Problem: Patient Education: Go to Patient Education Activity  Goal: Patient/Family Education  Outcome: Progressing Towards Goal

## 2020-02-06 NOTE — INTERDISCIPLINARY ROUNDS
Interdisciplinary team rounds were held 2/6/2020 with the following team members:  Care Management, Occupational Therapy and . Plan of care discussed. See clinical pathway and/or care plan for interventions and desired outcomes.

## 2020-02-07 LAB — GLUCOSE BLD STRIP.AUTO-MCNC: 85 MG/DL (ref 65–100)

## 2020-02-07 PROCEDURE — 65270000029 HC RM PRIVATE

## 2020-02-07 PROCEDURE — 74011250637 HC RX REV CODE- 250/637: Performed by: INTERNAL MEDICINE

## 2020-02-07 PROCEDURE — 92507 TX SP LANG VOICE COMM INDIV: CPT

## 2020-02-07 PROCEDURE — 74011250637 HC RX REV CODE- 250/637: Performed by: HOSPITALIST

## 2020-02-07 PROCEDURE — 82962 GLUCOSE BLOOD TEST: CPT

## 2020-02-07 PROCEDURE — 74011250636 HC RX REV CODE- 250/636: Performed by: INTERNAL MEDICINE

## 2020-02-07 PROCEDURE — 97535 SELF CARE MNGMENT TRAINING: CPT

## 2020-02-07 PROCEDURE — 97530 THERAPEUTIC ACTIVITIES: CPT

## 2020-02-07 RX ADMIN — ACETAMINOPHEN 650 MG: 325 TABLET, FILM COATED ORAL at 18:04

## 2020-02-07 RX ADMIN — ACETAMINOPHEN 650 MG: 325 TABLET, FILM COATED ORAL at 05:40

## 2020-02-07 RX ADMIN — METFORMIN HYDROCHLORIDE 500 MG: 500 TABLET, FILM COATED ORAL at 17:00

## 2020-02-07 RX ADMIN — METOPROLOL SUCCINATE 25 MG: 25 TABLET, FILM COATED, EXTENDED RELEASE ORAL at 08:30

## 2020-02-07 RX ADMIN — GUAIFENESIN 100 MG: 100 SOLUTION ORAL at 18:04

## 2020-02-07 RX ADMIN — METFORMIN HYDROCHLORIDE 500 MG: 500 TABLET, FILM COATED ORAL at 08:29

## 2020-02-07 RX ADMIN — ENOXAPARIN SODIUM 40 MG: 40 INJECTION SUBCUTANEOUS at 14:46

## 2020-02-07 RX ADMIN — ACETAMINOPHEN 650 MG: 325 TABLET, FILM COATED ORAL at 22:42

## 2020-02-07 RX ADMIN — ATORVASTATIN CALCIUM 80 MG: 80 TABLET, FILM COATED ORAL at 22:39

## 2020-02-07 RX ADMIN — ASPIRIN 81 MG 81 MG: 81 TABLET ORAL at 08:30

## 2020-02-07 RX ADMIN — ACETAMINOPHEN 650 MG: 325 TABLET, FILM COATED ORAL at 11:47

## 2020-02-07 RX ADMIN — LISINOPRIL 40 MG: 20 TABLET ORAL at 08:30

## 2020-02-07 NOTE — PROGRESS NOTES
Hospitalist Progress Note     Admit Date:  2019  9:07 AM   Name:  Priti Kelly   Age:  53 y.o.  :  1975   MRN:  [id number]   PCP:  Zheng Urban MD  Treatment Team: Attending Provider: Bryanna Bowser MD; Care Manager: Christina Salcedo RN; Care Manager: Royce Ramirez, LMSW; Consulting Provider: Beatriz Marin MD; Utilization Review: Mayuri Alonso RN; Nurse Practitioner: Aurora Trujillo NP; Speech Language Pathologist: Mary Wright; Occupational Therapy Assistant: Yohana Akins; Physical Therapist: Teresa Sales, PT, DPT    Subjective: This is a 54 yo male with PMH of DM II, HTN who presented 19 with c/o left leg and arm weakness, left facial droop and dysarthria.  Code S called. MRI with acute infarctions in left cerebellar hemisphere, deep frontoparietal white matter and right paramedian yariel.  Also noted to have old lacunar infarcts. CTA without large vessel occlusions, however some stenosis noted.  Echo showed PFO, Cardiology to f/u outpatient for evaluation for closure.  Neurology consulted.  Started on ASA, statin.  Pt is uninsured, difficult placement, case management working on plan.       patient seen examined at bedside. No overnight events. He has no complaints this time. He sitting in a chair and appears comfortable. Nursing notes and chart reviewed. Review of Systems negative with exception of pertinent positives noted above.       Objective:     Patient Vitals for the past 24 hrs:   Temp Pulse Resp BP SpO2   20 0800 97.6 °F (36.4 °C) 64 16 (!) 152/91 98 %   20 0400 98 °F (36.7 °C) 70 18 145/84 95 %   20 0000 98.2 °F (36.8 °C) 64 18 136/80 99 %   20 2000 98 °F (36.7 °C) 61 18 148/87 98 %   20 1200 97.8 °F (36.6 °C) (!) 56 18 151/89 99 %     Oxygen Therapy  O2 Sat (%): 98 % (20 0800)  Pulse via Oximetry: 66 beats per minute (20 0000)  O2 Device: Room air (02/01/20 0000)  No intake or output data in the 24 hours ending 02/07/20 1038      General:    Well nourished. Alert. Left upper extremity sling. Able to move his finger  CV:   RRR. No murmur, rub, or gallop. Lungs:   CTAB. No wheezing, rhonchi, or rales. Abdomen:   Soft, nontender, nondistended. Extremities: Warm and dry. No cyanosis or edema. No change of left lower extremity movement. Skin:     No rashes or jaundice. Data Review:  I have reviewed all labs, meds, telemetry events, and studies from the last 24 hours. Recent Results (from the past 24 hour(s))   GLUCOSE, POC    Collection Time: 02/07/20  6:57 AM   Result Value Ref Range    Glucose (POC) 85 65 - 100 mg/dL        All Micro Results     None          Current Meds:  Current Facility-Administered Medications   Medication Dose Route Frequency    lip protectant (BLISTEX) ointment 1 Each  1 Each Topical PRN    acetaminophen (TYLENOL) tablet 650 mg  650 mg Oral Q6H    metFORMIN (GLUCOPHAGE) tablet 500 mg  500 mg Oral BID WITH MEALS    metoprolol succinate (TOPROL-XL) XL tablet 25 mg  25 mg Oral DAILY    polyethylene glycol (MIRALAX) packet 17 g  17 g Oral DAILY PRN    guaiFENesin (ROBITUSSIN) 100 mg/5 mL oral liquid 100 mg  100 mg Oral Q4H PRN    hydrALAZINE (APRESOLINE) 20 mg/mL injection 20 mg  20 mg IntraVENous Q4H PRN    atorvastatin (LIPITOR) tablet 80 mg  80 mg Oral QHS    lisinopril (PRINIVIL, ZESTRIL) tablet 40 mg  40 mg Oral DAILY    sodium chloride (NS) flush 5-40 mL  5-40 mL IntraVENous PRN    ondansetron (ZOFRAN) injection 4 mg  4 mg IntraVENous Q4H PRN    aspirin chewable tablet 81 mg  81 mg Oral DAILY    enoxaparin (LOVENOX) injection 40 mg  40 mg SubCUTAneous Q24H    dextrose 40% (GLUTOSE) oral gel 1 Tube  15 g Oral PRN    glucagon (GLUCAGEN) injection 1 mg  1 mg IntraMUSCular PRN    dextrose (D50W) injection syrg 12.5-25 g  25-50 mL IntraVENous PRN       Other Studies (last 24 hours):  No results found.     Assessment and Plan:     Hospital Problems as of 2/7/2020 Date Reviewed: 3/3/2017          Codes Class Noted - Resolved POA    PFO (patent foramen ovale) ICD-10-CM: Q21.1  ICD-9-CM: 745.5  12/17/2019 - Present Yes        Arrhythmia ICD-10-CM: I49.9  ICD-9-CM: 427.9  12/15/2019 - Present Yes        Hyperlipemia ICD-10-CM: E78.5  ICD-9-CM: 272.4  12/15/2019 - Present Yes        * (Principal) Acute embolic stroke [facility]) FSP-75-BN: I63.9  ICD-9-CM: 434.11  12/12/2019 - Present Yes        Hypertension (Chronic) ICD-10-CM: I10  ICD-9-CM: 401.9  Unknown - Present Yes        Type 2 diabetes mellitus (HCC) (Chronic) ICD-10-CM: E11.9  ICD-9-CM: 250.00  Unknown - Present Yes              PLAN:    Acute embolic stroke: POA  -Dysphagia   -MRI head: with acute infarcts in L cerebellar hemisphere, deep frontoparietal white matter, and R paramedian yariel.  Also noted old lacunar infarcts. -ISMAEL: 3 mm PFO, Cardiology stated he needs an evaluation for closure PFO with high risk features including significant (3 mm) separation of septum secundum and primum as well as large shunt.  Will await recovery of CVA.  Plan 4 week event monitor at discharge.  Will then see in offfice and if no arrythmias will plan PFO closure  -statin, ASA  -PT/OT/Speech   -will check labs periodicly as last check was 2/5 and was unremarkable     Hypertension: continue home meds      Type 2 diabetes mellitus:  Metformin      Left shoulder pain, resolved (2/2)  -Tylenol every 6 hours scheduled as patient is frequently asking for it for left shoulder pain.        Arrhythmia: had brief wide complex tachycardia early in admission, will need event monitor as outpt per cards     DVT Prophylaxis: lovenox  DISPO: Difficult placement, case management working on plan. Patient has no medical insurance. Can't go home as wife is disabled and is not safe at home without supervision as he needs long term care.     Signed:  Luz Dumont MD

## 2020-02-07 NOTE — PROGRESS NOTES
SPEECH PATHOLOGY NOTE:    Attempted to see patient. Sleeping, but easily roused. He reports he is tired and would like ST to come back later. Will re attempt at later time/date as schedule permits.        [address]ayla [address] CCC-SLP

## 2020-02-07 NOTE — PROGRESS NOTES
Neurolinguistic Goals:  LTG: Patient will increase neuro-linguistic abilities to increase safety and awareness in functional living environment   STG: Patient will participate in speech/language/cognitive evaluation. MET 12/17/19  STG: Patient will solve mathematical problems with 80%accuracy given minimal cueing   STG: Patient will name 10 items to concrete category in 1 minute given minimal cueing. Progressing 1/31/2020  STG: Patient will participate in verbal reasoning tasks with 80% accuracy given minimal cueing  STG: Patient will complete mental manipulation tasks with 80% accuracy given minimal cueing  STG: Patient will complete working memory/attention tasks with 80% accuracy given minimal cueing  STG: Patient will recall relevant verbal information with 80% accuracy with minimal assistance  STG: Patient will utilize memory compensatory strategies to improve recall of functional list/message with 90%accuracy given minimal assistance  STG: Patient will participate in ongoing cognitive linguistic evaluation for therapeutic assessment. MET 1/31/2020     Dysarthria Goals:  LTG: Patient will develop functional and intelligible speech and utilize compensatory strategies to improve communication across environments  STG: Patient will fill in carrier phrase/complete automatic speech tasks with 95% accuracy given minimal cueing. MET 1/31/2020  STG: Patient will repeat phrases, sentences with 85% accuracy given minimal cueing. MET 1/31/2020  STG: Patient will utilize compensatory strategies across tasks with 90% accuracy given minimal cueing. MET 1/31/2020  STG: Patient will utilize compensatory strategies at conversation level with 90% accuracy independently.  Added 1/31/2020    SPEECH LANGUAGE PATHOLOGY: SPEECH-LANGUAGE/COGNITION: Daily Note 2    NAME/AGE/GENDER: Dora Neal is a 53 y.o. male  DATE: 2/7/2020  PRIMARY DIAGNOSIS: Acute ischemic stroke [facility]) [I63.9]  Cerebrovascular accident (CVA) due to embolism ([facility] [I63.9]      ICD-10: Treatment Diagnosis: R47.1 Dysarthria and Anarthria  R41.841 Cognitive-Communication Deficit    INTERDISCIPLINARY COLLABORATION: n/a  PRECAUTIONS/ALLERGIES: Patient has no known allergies. SUBJECTIVE   Awake now. Patient agreeable to participate. Problem List:  (Impairments causing functional limitations):  1. Dysarthria  2. Cognitive linguistic impaired     Orientation:   Person  Place  Time  Situation     Pain: Pain Scale 1: Numeric (0 - 10)  Pain Intensity 1: 0     OBJECTIVE   The following treatment tasks were completed to address attention and memory. Oral directions (3 components): 75%  Use of compensatory strategy (write it down): 100% with breakdown and repetition         ASSESSMENT   Patient presents with deficits in attention and memory. Patient required repetition and breakdown of directions. When additional components involved (left, right, specific street names, etc.), increased difficulty with decreased attention to main elements. Reviewed and practiced using compensatory strategies to improve recall including request repetition, ask speaker to go slow/break it down, write down key information, and check work. Patient writing down info verbatim, despite cues to focus on main crucial elements. Patient currently functioning below baseline. Will continue to follow to improve functional independence/communication. Tool Used: MODIFIED BRITT SCALE (mRS)   Score   No Symptoms  [] 0   No significant disability despite symptoms; able to carry out all usual duties and activities  [] 1   Slight disability; unable to carry out all previous activities but able to look after own affairs without assistance.    [x] 2   Moderate disability; requiring some help but able to walk without assistance  [] 3   Moderately severe disability; unable to walk without assistance and unable to attend to own bodily needs without assistance  [] 4   Severe disability; bedridden, incontinent, and requiring constant nursing care and attention  [] 5      Score:  Initial: 2 Most Recent:  (Date 02/07/20 )   Interpretation of Tool: The Modified Marty Scale is a 7-point scaled used to quantify level of disability as it relates to a patient's functional abilities. PLAN    FREQUENCY/DURATION: Continue to follow patient 2 times a week for duration of hospital stay to address above goals. - Recommendations for next treatment session: Next treatment will address cognition   REHABILITATION POTENTIAL FOR STATED GOALS: Good           RECOMMENDATIONS   STRATEGIES:   · Memory strategies  · Dysarthria strategies     EDUCATION:  · Recommendations discussed with Patient     RECOMMENDATIONS for CONTINUED SPEECH THERAPY: YES: Anticipate need for ongoing speech therapy during this hospitalization and at next level of care. Compliance with Program/Exercises: Will assess as treatment progresses  Continuation of Skilled Services/Medical Necessity:   Patient is expected to demonstrate progress in cognitive ability to decrease assistance required communication, increase independence with activities of daily living and increase communication with family/caregivers.  Patient continues to require skilled intervention due to impaired cognitive linguistic abilities.      SAFETY:  After treatment position/precautions:  · Upright in bed  · Call light within reach    Total Treatment Duration:   Time In: 1352  Time Out: [address] [facility] [address] [address] [facility]

## 2020-02-07 NOTE — PROGRESS NOTES
Visit with patient to build rapport with .   Calm  Encouraged with presence and words of hope      Lachelle White,  Staff   C: [phone]  /  [email or web address]

## 2020-02-07 NOTE — PROGRESS NOTES
Flex sig with biopsies by Dr Parks. Sample collected by Jake Cuevas    Problem: Patient Education: Go to Patient Education Activity  Goal: Patient/Family Education  Outcome: Progressing Towards Goal     Problem: Pressure Injury - Risk of  Goal: *Prevention of pressure injury  Description  Document Dewey Scale and appropriate interventions in the flowsheet. Outcome: Progressing Towards Goal  Note: Pressure Injury Interventions:  Sensory Interventions: Assess changes in LOC    Moisture Interventions: Absorbent underpads, Apply protective barrier, creams and emollients, Check for incontinence Q2 hours and as needed, Limit adult briefs, Maintain skin hydration (lotion/cream), Minimize layers, Moisture barrier, Offer toileting Q_hr    Activity Interventions: Increase time out of bed, Pressure redistribution bed/mattress(bed type), PT/OT evaluation    Mobility Interventions: HOB 30 degrees or less, Pressure redistribution bed/mattress (bed type), PT/OT evaluation, Turn and reposition approx. every two hours(pillow and wedges)    Nutrition Interventions: Document food/fluid/supplement intake    Friction and Shear Interventions: Apply protective barrier, creams and emollients, Feet elevated on foot rest, Lift sheet, HOB 30 degrees or less, Transferring/repositioning devices                Problem: Patient Education: Go to Patient Education Activity  Goal: Patient/Family Education  Outcome: Progressing Towards Goal     Problem: Falls - Risk of  Goal: *Absence of Falls  Description  Document [place] Fall Risk and appropriate interventions in the flowsheet.   Outcome: Progressing Towards Goal  Note: Fall Risk Interventions:  Mobility Interventions: Bed/chair exit alarm, Communicate number of staff needed for ambulation/transfer, Patient to call before getting OOB, Utilize walker, cane, or other assistive device    Mentation Interventions: Bed/chair exit alarm    Medication Interventions: Bed/chair exit alarm, Patient to call before getting OOB, Teach patient to arise slowly    Elimination Interventions: Bed/chair exit alarm, Call light in reach, Patient to call for help with toileting needs, Toilet paper/wipes in reach, Toileting schedule/hourly rounds, Urinal in reach    History of Falls Interventions: Bed/chair exit alarm, Door open when patient unattended         Problem: Patient Education: Go to Patient Education Activity  Goal: Patient/Family Education  Outcome: Progressing Towards Goal     Problem: Diabetes Self-Management  Goal: *Disease process and treatment process  Description  Define diabetes and identify own type of diabetes; list 3 options for treating diabetes. Outcome: Progressing Towards Goal  Goal: *Incorporating nutritional management into lifestyle  Description  Describe effect of type, amount and timing of food on blood glucose; list 3 methods for planning meals. Outcome: Progressing Towards Goal  Goal: *Incorporating physical activity into lifestyle  Description  State effect of exercise on blood glucose levels. Outcome: Progressing Towards Goal  Goal: *Developing strategies to promote health/change behavior  Description  Define the ABC's of diabetes; identify appropriate screenings, schedule and personal plan for screenings. Outcome: Progressing Towards Goal  Goal: *Using medications safely  Description  State effect of diabetes medications on diabetes; name diabetes medication taking, action and side effects. Outcome: Progressing Towards Goal  Goal: *Monitoring blood glucose, interpreting and using results  Description  Identify recommended blood glucose targets  and personal targets. Outcome: Progressing Towards Goal  Goal: *Prevention, detection, treatment of acute complications  Description  List symptoms of hyper- and hypoglycemia; describe how to treat low blood sugar and actions for lowering  high blood glucose level.   Outcome: Progressing Towards Goal  Goal: *Prevention, detection and treatment of chronic complications  Description  Define the natural course of diabetes and describe the relationship of blood glucose levels to long term complications of diabetes.   Outcome: Progressing Towards Goal  Goal: *Developing strategies to address psychosocial issues  Description  Describe feelings about living with diabetes; identify support needed and support network  Outcome: Progressing Towards Goal     Problem: Patient Education: Go to Patient Education Activity  Goal: Patient/Family Education  Outcome: Progressing Towards Goal     Problem: Nutrition Deficit  Goal: *Optimize nutritional status  Outcome: Progressing Towards Goal     Problem: General Medical Care Plan  Goal: *Vital signs within specified parameters  Outcome: Progressing Towards Goal  Goal: *Labs within defined limits  Outcome: Progressing Towards Goal  Goal: *Absence of infection signs and symptoms  Description  Wash hand more often   Outcome: Progressing Towards Goal  Goal: *Optimal pain control at patient's stated goal  Outcome: Progressing Towards Goal  Goal: *Skin integrity maintained  Outcome: Progressing Towards Goal  Goal: *Fluid volume balance  Outcome: Progressing Towards Goal  Goal: *Optimize nutritional status  Outcome: Progressing Towards Goal  Goal: *Anxiety reduced or absent  Outcome: Progressing Towards Goal  Goal: *Progressive mobility and function (eg: ADL's)  Outcome: Progressing Towards Goal     Problem: Patient Education: Go to Patient Education Activity  Goal: Patient/Family Education  Outcome: Progressing Towards Goal     Problem: Patient Education: Go to Patient Education Activity  Goal: Patient/Family Education  Outcome: Progressing Towards Goal     Problem: Patient Education: Go to Patient Education Activity  Goal: Patient/Family Education  Outcome: Progressing Towards Goal     Problem: Patient Education: Go to Patient Education Activity  Goal: Patient/Family Education  Outcome: Progressing Towards Goal     Problem: Pain  Goal: *Control of Pain  Outcome: Progressing Towards Goal Problem: Patient Education: Go to Patient Education Activity  Goal: Patient/Family Education  Outcome: Progressing Towards Goal

## 2020-02-07 NOTE — PROGRESS NOTES
Problem: Patient Education: Go to Patient Education Activity  Goal: Patient/Family Education  Outcome: Progressing Towards Goal     Problem: Pressure Injury - Risk of  Goal: *Prevention of pressure injury  Description  Document Dewey Scale and appropriate interventions in the flowsheet.   Outcome: Progressing Towards Goal  Note: Pressure Injury Interventions:  Sensory Interventions: Float heels    Moisture Interventions: Absorbent underpads, Apply protective barrier, creams and emollients, Offer toileting Q_hr, Moisture barrier, Minimize layers, Maintain skin hydration (lotion/cream)    Activity Interventions: PT/OT evaluation, Pressure redistribution bed/mattress(bed type), Increase time out of bed    Mobility Interventions: HOB 30 degrees or less, PT/OT evaluation, Pressure redistribution bed/mattress (bed type)    Nutrition Interventions: Offer support with meals,snacks and hydration    Friction and Shear Interventions: HOB 30 degrees or less, Feet elevated on foot rest

## 2020-02-08 LAB — GLUCOSE BLD STRIP.AUTO-MCNC: 85 MG/DL (ref 65–100)

## 2020-02-08 PROCEDURE — 82962 GLUCOSE BLOOD TEST: CPT

## 2020-02-08 PROCEDURE — 65270000029 HC RM PRIVATE

## 2020-02-08 PROCEDURE — 74011250637 HC RX REV CODE- 250/637: Performed by: HOSPITALIST

## 2020-02-08 PROCEDURE — 74011250637 HC RX REV CODE- 250/637: Performed by: INTERNAL MEDICINE

## 2020-02-08 PROCEDURE — 74011250636 HC RX REV CODE- 250/636: Performed by: INTERNAL MEDICINE

## 2020-02-08 PROCEDURE — 77030018846 HC SOL IRR STRL H20 ICUM -A

## 2020-02-08 RX ADMIN — ACETAMINOPHEN 650 MG: 325 TABLET, FILM COATED ORAL at 23:07

## 2020-02-08 RX ADMIN — LISINOPRIL 40 MG: 20 TABLET ORAL at 08:31

## 2020-02-08 RX ADMIN — GUAIFENESIN 100 MG: 100 SOLUTION ORAL at 17:05

## 2020-02-08 RX ADMIN — METFORMIN HYDROCHLORIDE 500 MG: 500 TABLET, FILM COATED ORAL at 08:31

## 2020-02-08 RX ADMIN — ACETAMINOPHEN 650 MG: 325 TABLET, FILM COATED ORAL at 06:19

## 2020-02-08 RX ADMIN — ENOXAPARIN SODIUM 40 MG: 40 INJECTION SUBCUTANEOUS at 13:55

## 2020-02-08 RX ADMIN — METFORMIN HYDROCHLORIDE 500 MG: 500 TABLET, FILM COATED ORAL at 17:02

## 2020-02-08 RX ADMIN — ATORVASTATIN CALCIUM 80 MG: 80 TABLET, FILM COATED ORAL at 21:11

## 2020-02-08 RX ADMIN — ASPIRIN 81 MG 81 MG: 81 TABLET ORAL at 08:31

## 2020-02-08 RX ADMIN — ACETAMINOPHEN 650 MG: 325 TABLET, FILM COATED ORAL at 17:02

## 2020-02-08 RX ADMIN — METOPROLOL SUCCINATE 25 MG: 25 TABLET, FILM COATED, EXTENDED RELEASE ORAL at 08:31

## 2020-02-08 RX ADMIN — ACETAMINOPHEN 650 MG: 325 TABLET, FILM COATED ORAL at 11:41

## 2020-02-08 NOTE — PROGRESS NOTES
Hospitalist Progress Note     Admit Date:  2019  9:07 AM   Name:  Carlos Strauss   Age:  53 y.o.  :  1975   MRN:  [id number]   PCP:  Irvin Pang MD  Treatment Team: Attending Provider: Malissa Penn MD; Care Manager: Royal Benitez RN; Care Manager: Nicole Telles LMSW; Consulting Provider: Nehal Klein MD; Utilization Review: Celestina Garcia RN; Nurse Practitioner: Madelin Serrano NP; Primary Nurse: Lu Flores RN; Charge Nurse: Lisseth Lou    Subjective: This is a 52 yo male with PMH of DM II, HTN who presented 19 with c/o left leg and arm weakness, left facial droop and dysarthria.  Code S called. MRI with acute infarctions in left cerebellar hemisphere, deep frontoparietal white matter and right paramedian yariel.  Also noted to have old lacunar infarcts. CTA without large vessel occlusions, however some stenosis noted.  Echo showed PFO, Cardiology to f/u outpatient for evaluation for closure.  Neurology consulted.  Started on ASA, statin.  Pt is uninsured, difficult placement, case management working on plan.        patient seen examined at bedside. No overnight events. No complaints this time. Nursing notes and chart reviewed. Review of Systems negative with exception of pertinent positives noted above. Objective:     Patient Vitals for the past 24 hrs:   Temp Pulse Resp BP SpO2   20 0800 97.6 °F (36.4 °C) 66 18 135/80 95 %   20 0400 97.5 °F (36.4 °C) 66 20 134/80 98 %   20 0000 98.4 °F (36.9 °C) 63 20 144/81 99 %   20 2000 98.1 °F (36.7 °C) 64 20 142/82 100 %   20 1600 97.4 °F (36.3 °C) 67 20 (!) 132/92 97 %   20 1200 98.2 °F (36.8 °C) 77 20 119/81 98 %     Oxygen Therapy  O2 Sat (%): 95 % (20 0800)  Pulse via Oximetry: 66 beats per minute (20 0000)  O2 Device: Room air (20)  No intake or output data in the 24 hours ending 20 0942      General:    Well nourished. Alert.   LUE in sling, able to move fingers  CV:   RRR. No murmur, rub, or gallop. Lungs:   CTAB. No wheezing, rhonchi, or rales. Abdomen:   Soft, nontender, nondistended. Extremities: Warm and dry. No cyanosis or edema. Skin:     No rashes or jaundice. Data Review:  I have reviewed all labs, meds, telemetry events, and studies from the last 24 hours. Recent Results (from the past 24 hour(s))   GLUCOSE, POC    Collection Time: 02/08/20  7:15 AM   Result Value Ref Range    Glucose (POC) 85 65 - 100 mg/dL        All Micro Results     None          Current Meds:  Current Facility-Administered Medications   Medication Dose Route Frequency    lip protectant (BLISTEX) ointment 1 Each  1 Each Topical PRN    acetaminophen (TYLENOL) tablet 650 mg  650 mg Oral Q6H    metFORMIN (GLUCOPHAGE) tablet 500 mg  500 mg Oral BID WITH MEALS    metoprolol succinate (TOPROL-XL) XL tablet 25 mg  25 mg Oral DAILY    polyethylene glycol (MIRALAX) packet 17 g  17 g Oral DAILY PRN    guaiFENesin (ROBITUSSIN) 100 mg/5 mL oral liquid 100 mg  100 mg Oral Q4H PRN    hydrALAZINE (APRESOLINE) 20 mg/mL injection 20 mg  20 mg IntraVENous Q4H PRN    atorvastatin (LIPITOR) tablet 80 mg  80 mg Oral QHS    lisinopril (PRINIVIL, ZESTRIL) tablet 40 mg  40 mg Oral DAILY    sodium chloride (NS) flush 5-40 mL  5-40 mL IntraVENous PRN    ondansetron (ZOFRAN) injection 4 mg  4 mg IntraVENous Q4H PRN    aspirin chewable tablet 81 mg  81 mg Oral DAILY    enoxaparin (LOVENOX) injection 40 mg  40 mg SubCUTAneous Q24H    dextrose 40% (GLUTOSE) oral gel 1 Tube  15 g Oral PRN    glucagon (GLUCAGEN) injection 1 mg  1 mg IntraMUSCular PRN    dextrose (D50W) injection syrg 12.5-25 g  25-50 mL IntraVENous PRN       Other Studies (last 24 hours):  No results found.     Assessment and Plan:     Hospital Problems as of 2/8/2020 Date Reviewed: 3/3/2017          Codes Class Noted - Resolved POA    PFO (patent foramen ovale) ICD-10-CM: Q21.1  ICD-9-CM: 745.5 12/17/2019 - Present Yes        Arrhythmia ICD-10-CM: I49.9  ICD-9-CM: 427.9  12/15/2019 - Present Yes        Hyperlipemia ICD-10-CM: E78.5  ICD-9-CM: 272.4  12/15/2019 - Present Yes        * (Principal) Acute embolic stroke [facility]) ADZ-24-XO: I63.9  ICD-9-CM: 434.11  12/12/2019 - Present Yes        Hypertension (Chronic) ICD-10-CM: I10  ICD-9-CM: 401.9  Unknown - Present Yes        Type 2 diabetes mellitus (HCC) (Chronic) ICD-10-CM: E11.9  ICD-9-CM: 250.00  Unknown - Present Yes              PLAN:    Acute embolic stroke: POA  -Dysphagia on mechanical soft diet  -MRI head: with acute infarcts in L cerebellar hemisphere, deep frontoparietal white matter, and R paramedian yariel.  Also noted old lacunar infarcts. -ISMAEL: 3 mm PFO, Cardiology stated he needs an evaluation for closure PFO with high risk features including significant (3 mm) separation of septum secundum and primum as well as large shunt.  Will await recovery of CVA.  Plan 4 week event monitor at discharge.  Will then see in offfice and if no arrythmias will plan PFO closure  -statin, ASA  -PT/OT/Speech   -will check labs periodicly as last check was 2/5/2020 and was unremarkable     Hypertension: continue home meds      Type 2 diabetes mellitus:  Metformin, TIDAC sugar checks      Left shoulder pain, resolved (2/2)  -Tylenol every 6 hours scheduled as patient is frequently asking for it for left shoulder pain.        Arrhythmia: had brief wide complex tachycardia early in admission, will need event monitor as outpt per cards     DVT Prophylaxis: lovenox  DISPO: Difficult placement, case management working on plan. Patient has no medical insurance. Can't go home as wife is disabled and is not safe at home without supervision as he needs long term care.       Signed:  Kayla Mullins MD

## 2020-02-08 NOTE — PROGRESS NOTES
02/08/20 0725   NIH Stroke Scale   Interval Other (comment)  (Dual end of shift with JERRY West)   LOC 0   LOC Questions 0   LOC Commands 0   Best Gaze 0   Visual 0   Facial Palsy 1   Motor Right Arm 0   Motor Left Arm 2   Motor Right Leg 0   Motor Left Leg 2   Limb Ataxia 0   Sensory 0   Best Language 1   Dysarthria 1   Extinction and Inattention 0   Total 7

## 2020-02-08 NOTE — PROGRESS NOTES
02/08/20 1852   NIH Stroke Scale   Interval Other (comment)   LOC 0  (Dual with Ishmael Mosley RN)   LOC Questions 0   LOC Commands 0   Best Gaze 0   Visual 0   Facial Palsy 1   Motor Right Arm 0   Motor Left Arm 2   Motor Right Leg 0   Motor Left Leg 2   Limb Ataxia 0   Sensory 0   Best Language 1   Dysarthria 1   Extinction and Inattention 0   Total 7

## 2020-02-09 LAB — GLUCOSE BLD STRIP.AUTO-MCNC: 83 MG/DL (ref 65–100)

## 2020-02-09 PROCEDURE — 65270000029 HC RM PRIVATE

## 2020-02-09 PROCEDURE — 97535 SELF CARE MNGMENT TRAINING: CPT

## 2020-02-09 PROCEDURE — 82962 GLUCOSE BLOOD TEST: CPT

## 2020-02-09 PROCEDURE — 74011250637 HC RX REV CODE- 250/637: Performed by: HOSPITALIST

## 2020-02-09 PROCEDURE — 74011250636 HC RX REV CODE- 250/636: Performed by: INTERNAL MEDICINE

## 2020-02-09 PROCEDURE — 74011250637 HC RX REV CODE- 250/637: Performed by: INTERNAL MEDICINE

## 2020-02-09 PROCEDURE — 97530 THERAPEUTIC ACTIVITIES: CPT

## 2020-02-09 RX ADMIN — LISINOPRIL 40 MG: 20 TABLET ORAL at 08:05

## 2020-02-09 RX ADMIN — METOPROLOL SUCCINATE 25 MG: 25 TABLET, FILM COATED, EXTENDED RELEASE ORAL at 08:05

## 2020-02-09 RX ADMIN — ACETAMINOPHEN 650 MG: 325 TABLET, FILM COATED ORAL at 17:17

## 2020-02-09 RX ADMIN — GUAIFENESIN 100 MG: 100 SOLUTION ORAL at 17:21

## 2020-02-09 RX ADMIN — METFORMIN HYDROCHLORIDE 500 MG: 500 TABLET, FILM COATED ORAL at 17:17

## 2020-02-09 RX ADMIN — ATORVASTATIN CALCIUM 80 MG: 80 TABLET, FILM COATED ORAL at 21:15

## 2020-02-09 RX ADMIN — METFORMIN HYDROCHLORIDE 500 MG: 500 TABLET, FILM COATED ORAL at 08:05

## 2020-02-09 RX ADMIN — ACETAMINOPHEN 650 MG: 325 TABLET, FILM COATED ORAL at 23:21

## 2020-02-09 RX ADMIN — ENOXAPARIN SODIUM 40 MG: 40 INJECTION SUBCUTANEOUS at 15:09

## 2020-02-09 RX ADMIN — ACETAMINOPHEN 650 MG: 325 TABLET, FILM COATED ORAL at 11:28

## 2020-02-09 RX ADMIN — ACETAMINOPHEN 650 MG: 325 TABLET, FILM COATED ORAL at 05:07

## 2020-02-09 RX ADMIN — ASPIRIN 81 MG 81 MG: 81 TABLET ORAL at 08:05

## 2020-02-09 NOTE — PROGRESS NOTES
02/09/20 0651   NIH Stroke Scale   Interval Other (comment)   LOC 0   LOC Questions 0   LOC Commands 0   Best Gaze 0   Visual 0   Facial Palsy 1   Motor Right Arm 0   Motor Left Arm 2   Motor Right Leg 0   Motor Left Leg 2   Limb Ataxia 0   Sensory 0   Best Language 1   Dysarthria 1   Extinction and Inattention 0   Total 7   Dual NIH w/ Juan A Kumar

## 2020-02-09 NOTE — PROGRESS NOTES
Problem: Self Care Deficits Care Plan (Adult)  Goal: *Acute Goals and Plan of Care (Insert Text)  Description  GOALS UPDATED 1/22/2020:   1. Patient will complete dynamic sitting balance for ADLs with supervision. PROGRESSING 2/9/2020  2. Patient will demonstrate appropriate safety awareness and protection of L UE during bed mobility and functional transfers with minimal cues. (Progressing)  3. Patient will complete total body bathing and dressing with moderate assistance and adaptive equipment as needed. PROGRESSING 2/9/2020  4. Patient will complete weightbearing into the L UE with ADL tasks with minimal assistance to improve ability to use as a functional assist during ADL tasks. (Progressing)  5. Patient will demonstrate modified independence with therapeutic exercises to improve strength in L UE for ADLs/functional transfers. (Progressing)  6. Patient will complete bed mobility with stand by assistance and adaptive equipment as needed in preparation for functional transfers. PROGRESSING 2/9/2020  7. 7. Patient will complete functional transfers with minimal assistance with equipment as needed. (New goal)8. (Goal Met)    Timeframe: 7 visits       Outcome: Progressing Towards Goal     OCCUPATIONAL THERAPY: Daily Note and AM    2/9/2020  INPATIENT: OT Visit Days: 3  Payor: MEDICAID PENDING / Plan: [employer] PENDING / Product Type: Medicaid /      NAME/AGE/GENDER: Alanna Boss is a 53 y.o. male   PRIMARY DIAGNOSIS:  Acute ischemic stroke ([facility] [I63.9]  Cerebrovascular accident (CVA) due to embolism ([facility] [C63.5] Acute embolic stroke ([facility] Acute embolic stroke ([facility]       ICD-10: Treatment Diagnosis:    · Generalized Muscle Weakness (M62.81)  · Other lack of cordination (R27.8)  · Hemiplegia and hemiparesis following cerebral infarction affecting   · left non-dominant side (I69.354)  · Abnormal posture (R29.3)   Precautions/Allergies:    NO PULLING ON LUE   LUE in sling with shoulder joint approximated and supported   Patient has no known allergies. ASSESSMENT:     Mr. Patty Brown is a 53 y.o. male presents to the hospital with L sided weakness and acute ischemic CVA. MRI revealed acute to subacute L cerebellar and R paramedian yariel infarcts. At baseline pt lives with his wife and is independent. One finger wide subluxation noted in L shoulder (1/22/2020).    2/9/2020  Pt found supine in bed upon arrival, alert and agreeable to OT treatment. Pt practiced bed mobility with ModA, sitting balance fair to good. Pt practiced scooting to head of bed with ModA/heavy cueing. Pt practiced sitting balance/tolerance while OT removed KT tape, cues for posture and cervical extension throughout. Pt tolerated tape removal well. Pt practiced bathing LUE/chest with Jody for thoroughness. OT adjusted sling to keep L elbow at 90* to better support LUE. Pt assisted with adjusting sling. Sling strap marked to note proper lengh. Pt returned to supine with Jody, left with call bell within reach. Pt is making progress towards goals. See above. Continue OT POC. This section established at most recent assessment   PROBLEM LIST (Impairments causing functional limitations):  1. Decreased Strength  2. Decreased ADL/Functional Activities  3. Decreased Transfer Abilities  4. Decreased Ambulation Ability/Technique  5. Decreased Balance  6. Decreased Activity Tolerance  7. Increased Fatigue  8. Decreased Flexibility/Joint Mobility  9. Decreased Knowledge of Precautions  10. Decreased [place] with Home Exercise Program   INTERVENTIONS PLANNED: (Benefits and precautions of occupational therapy have been discussed with the patient.)  1. Activities of daily living training  2. Adaptive equipment training  3. Balance training  4. Clothing management  5. Cognitive training  6. Donning&doffing training  7. Keanu tech training  8. Neuromuscular re-eduation  9. Therapeutic activity  10.  Therapeutic exercise     TREATMENT PLAN: Frequency/Duration: Follow patient 3 times per week to address above goals. Rehabilitation Potential For Stated Goals: Excellent     REHAB RECOMMENDATIONS (at time of discharge pending progress):    Placement: It is my opinion, based on this patient's performance to date, that Mr. Fiorella Urbina may benefit from intensive therapy at an [address] [place] [address] after discharge due to potential to make ongoing and sustainable functional improvement that is of practical value. Gabrielle Cortés Pt functioning far below independent baseline, demonstrating good improvement and participation. Pt would likely benefit greatly and increase independence from inpatient rehab stay. Equipment:    TBD               OCCUPATIONAL PROFILE AND HISTORY:   History of Present Injury/Illness (Reason for Referral):  See H&P  Past Medical History/Comorbidities:   Mr. Fiorella Urbina  has a past medical history of Acute ischemic stroke ([address] (12/12/2019), Acute pancreatitis (11/19/2014), Cerebrovascular accident (CVA) due to embolism ([address] (12/12/2019), Diabetes ([address] (2002), Diabetes ([address] Diabetes mellitus, and Hypertension. He also has no past medical history of Arthritis, Asthma, Autoimmune disease ([address] CAD (coronary artery disease), Cancer ([address] Chronic kidney disease, COPD, Dementia, Dementia ([address] Heart failure ([address] Ill-defined condition, Infectious disease, Liver disease, Other ill-defined conditions(799.89), Psychiatric disorder, PUD (peptic ulcer disease), Seizures ([address] or Sleep disorder. Mr. Fiorella Urbina  has a past surgical history that includes hx hernia repair and hx orthopaedic.   Social History/Living Environment:   Home Environment: Apartment  # Steps to Enter: 12  Rails to Enter: Yes  [employer] : Bilateral  One/Two Story Residence: One story  Living Alone: No  Support Systems: Spouse/Significant Other/Partner  Patient Expects to be Discharged to[de-identified] Unknown  Current DME Used/Available at Home: None  Tub or Shower Type: Tub/Shower combination  Prior Level of Function/Work/Activity:  Pt lives at home with his wife. Pt is typically independent with ADL/functional mobility. Pt does not drive. Pt was working part-time at [employer]. Personal Factors:          Age:  53 y.o. Past/Current Experience:  CVA with flaccid L side        Other factors that influence how disability is experienced by the patient:  multiple co-morbidities    Number of Personal Factors/Comorbidities that affect the Plan of Care: Extensive review of physical, cognitive, and psychosocial performance (3+):  HIGH COMPLEXITY   ASSESSMENT OF OCCUPATIONAL PERFORMANCE[de-identified]   Activities of Daily Living:   Basic ADLs (From Assessment) Complex ADLs (From Assessment)   Feeding: Setup  Oral Facial Hygiene/Grooming: Moderate assistance  Bathing: Moderate assistance  Upper Body Dressing: Maximum assistance  Lower Body Dressing: Total assistance  Toileting: Total assistance Instrumental ADL  Meal Preparation: Total assistance  Homemaking: Total assistance   Grooming/Bathing/Dressing Activities of Daily Living         Upper Body Bathing  Bathing Assistance: Moderate assistance (minimal for bathing LUE)  Position Performed: Seated edge of bed           Upper [address]: Moderate assistance       Bed/Mat Mobility  Supine to Sit: Moderate assistance  Sit to Supine: Minimum assistance  Scooting: Moderate assistance; Additional time     Most Recent Physical Functioning:   Gross Assessment:  AROM: Grossly decreased, non-functional(LUE flaccid )  PROM: Generally decreased, functional(LUE proximally, shoulder flexion to ~90*)  Strength: Grossly decreased, non-functional(LUE)  Coordination: Grossly decreased, non-functional(LUE)  Tone: Abnormal(LUE)  Sensation: Intact(BUEs to light touch)               Posture:     Balance:  Sitting: Impaired  Sitting - Static: Fair (occasional); Good (unsupported)  Sitting - Dynamic: Fair (occasional) Bed Mobility:  Supine to Sit: Moderate assistance  Sit to Supine: Minimum assistance  Scooting: Moderate assistance; Additional time  Wheelchair Mobility:     Transfers:               Patient Vitals for the past 6 hrs:   BP BP Patient Position SpO2 Pulse   20 0800 136/84 At rest 97 % 68       Mental Status  Neurologic State: Alert  Orientation Level: Oriented X4  Cognition: Follows commands  Perception: Cues to maintain midline in sitting, Cues to maintain midline in standing(pt fatigued this afternoon)  Perseveration: No perseveration noted  Safety/Judgement: Awareness of environment, Fall prevention, Decreased insight into deficits                          Physical Skills Involved:  1. Range of Motion  2. Balance  3. Strength  4. Activity Tolerance  5. Fine Motor Control  6. Gross Motor Control Cognitive Skills Affected (resulting in the inability to perform in a timely and safe manner):  1. Perception  2. Expression Psychosocial Skills Affected:  1. Habits/Routines  2. Environmental Adaptation  3. Social Interaction  4. Emotional Regulation  5. Self-Awareness  6. Awareness of Others  7. Social Roles   Number of elements that affect the Plan of Care: 5+:  HIGH COMPLEXITY   CLINICAL DECISION MAKIN[address] [place] [id number] AM-PAC 6 Clicks   Daily Activity Inpatient Short Form  How much help from another person does the patient currently need. .. Total A Lot A Little None   1. Putting on and taking off regular lower body clothing? [x] 1   [] 2   [] 3   [] 4   2. Bathing (including washing, rinsing, drying)? [] 1   [x] 2   [] 3   [] 4   3. Toileting, which includes using toilet, bedpan or urinal?   [] 1   [x] 2   [] 3   [] 4   4. Putting on and taking off regular upper body clothing? [] 1   [x] 2   [] 3   [] 4   5. Taking care of personal grooming such as brushing teeth? [] 1   [x] 2   [] 3   [] 4   6. Eating meals? [] 1   [] 2   [x] 3   [] 4   © , Trustees of [address] [place] [address] [address] [address], under license to [employer].  All rights reserved      Score:  Initial: 11 Most Recent: 12 (Date: 1/22/2020 )    Interpretation of Tool:  Represents activities that are increasingly more difficult (i.e. Bed mobility, Transfers, Gait). Medical Necessity:     · Patient demonstrates   · good and excellent  ·  rehab potential due to higher previous functional level. Reason for Services/Other Comments:  · Patient continues to require skilled intervention due to   · Decreased independence with ADL/functional transfers that impacts overall quality of life. · .   Use of outcome tool(s) and clinical judgement create a POC that gives a: MODERATE COMPLEXITY         TREATMENT:   (In addition to Assessment/Re-Assessment sessions the following treatments were rendered)     Pre-treatment Symptoms/Complaints:    Pain: Initial:   Pain Intensity 1: 0  Post Session:  0   Self Care: (15 minutes): Procedure(s) (per grid) utilized to improve and/or restore self-care/home management as related to dressing, bathing and LUE sling management. Required minimal visual, verbal and manual cueing to facilitate activities of daily living skills and compensatory activities. Pt practiced bathing LUE/chest with Jody for thoroughness. OT adjusted sling to keep L elbow at 90* to better support LUE. Pt assisted with adjusting sling. Sling strap marked to note proper lengh. Pt returned to supine with Jody    Therapeutic Activity: (    8 minutes): Therapeutic activities including Bed transfers and lateral scooting to improve mobility, strength, balance and coordination. Required moderate   to promote dynamic balance in sitting and promote coordination of bilateral, upper extremity(s), lower extremity(s). Pt practiced bed mobility with ModA, sitting balance fair to good. Pt practiced scooting to head of bed with ModA/heavy cueing. Pt practiced sitting balance/tolerance while OT removed KT tape, cues for posture and cervical extension throughout. Pt tolerated tape removal well.        Date:  1/27/2020 Date:   Date: Activity/Exercise Parameters Parameters Parameters   LUE finger flexion, AROM gravity minimized then AAROM to complete ROM 2x10 reps     LUE finger extension AAROM 2x10 reps     LUE wrist extension, AROM gravity minimized then AAROM to complete ROM 1x10 reps     LUE wrist extension, AROM against gravity  1x10 reps     LUE wrist flexion AAROM 2x10 reps     LUE elbow flexion/extension PROM x10 reps     LUE shoulder flexion PROM x10 reps          Braces/Orthotics/Lines/Etc:   · LUE sling   · O2 device: Room air  · Treatment/Session Assessment:    · Response to Treatment:  Pt demonstrated good participation. · Interdisciplinary Collaboration:   o Occupational Therapist  · After treatment position/precautions:   o Supine in bed  o Bed alarm/tab alert on  o Bed/Chair-wheels locked  o Bed in low position  o Call light within reach   · Compliance with Program/Exercises: Compliant all of the time, Will assess as treatment progresses. · Recommendations/Intent for next treatment session: \"Next visit will focus on advancements to more challenging activities and reduction in assistance provided\".   Total Treatment Duration:  OT Patient Time In/Time Out  Time In: 0928  Time Out: 25 Jordon William, OTR/L

## 2020-02-09 NOTE — PROGRESS NOTES
Hospitalist Progress Note     Admit Date:  2019  9:07 AM   Name:  Tammy Montes   Age:  53 y.o.  :  1975   MRN:  [id number]   PCP:  Delwin Cooks, MD  Treatment Team: Attending Provider: Mikel Drew MD; Care Manager: Eric Ellison, RN; Care Manager: Lamar Huitron LMSW; Consulting Provider: Olivia Ramirez MD; Utilization Review: Saba Hardin RN; Nurse Practitioner: Beverly Estes NP; Charge Nurse: Daisy Nava    Subjective: This is a 54 yo male with PMH of DM II, HTN who presented 19 with c/o left leg and arm weakness, left facial droop and dysarthria.  Code S called. MRI with acute infarctions in left cerebellar hemisphere, deep frontoparietal white matter and right paramedian yariel.  Also noted to have old lacunar infarcts. CTA without large vessel occlusions, however some stenosis noted.  Echo showed PFO, Cardiology to f/u outpatient for evaluation for closure.  Neurology consulted.  Started on ASA, statin.  Pt is uninsured, difficult placement, case management working on plan.       patient seen and examined at bedside. No overnight events. No complaints    Nursing notes and chart reviewed. Review of Systems negative with exception of pertinent positives noted above. Objective:     Patient Vitals for the past 24 hrs:   Temp Pulse Resp BP SpO2   20 1200 97.9 °F (36.6 °C) 62 17 132/83 97 %   20 0800 97.9 °F (36.6 °C) 68 18 136/84 97 %   20 0400 97.3 °F (36.3 °C) 76 18 153/89 98 %   20 0000 98 °F (36.7 °C) 67 18 136/88 96 %   20 2000 98 °F (36.7 °C) 69 18 139/89 96 %   20 1600 98.2 °F (36.8 °C) 68 18 123/86 95 %     Oxygen Therapy  O2 Sat (%): 97 % (20 1200)  Pulse via Oximetry: 66 beats per minute (20 0000)  O2 Device: Room air (20 0000)  No intake or output data in the 24 hours ending 20 6414      General:    Well nourished. Alert. lue in sling, able to move fingers.  lle able to move slightly  CV:   RRR. No murmur, rub, or gallop. Lungs:   CTAB. No wheezing, rhonchi, or rales. Abdomen:   Soft, nontender, nondistended. Extremities: Warm and dry. No cyanosis or edema. Skin:     No rashes or jaundice. Data Review:  I have reviewed all labs, meds, telemetry events, and studies from the last 24 hours. Recent Results (from the past 24 hour(s))   GLUCOSE, POC    Collection Time: 02/09/20  7:14 AM   Result Value Ref Range    Glucose (POC) 83 65 - 100 mg/dL        All Micro Results     None          Current Meds:  Current Facility-Administered Medications   Medication Dose Route Frequency    lip protectant (BLISTEX) ointment 1 Each  1 Each Topical PRN    acetaminophen (TYLENOL) tablet 650 mg  650 mg Oral Q6H    metFORMIN (GLUCOPHAGE) tablet 500 mg  500 mg Oral BID WITH MEALS    metoprolol succinate (TOPROL-XL) XL tablet 25 mg  25 mg Oral DAILY    polyethylene glycol (MIRALAX) packet 17 g  17 g Oral DAILY PRN    guaiFENesin (ROBITUSSIN) 100 mg/5 mL oral liquid 100 mg  100 mg Oral Q4H PRN    hydrALAZINE (APRESOLINE) 20 mg/mL injection 20 mg  20 mg IntraVENous Q4H PRN    atorvastatin (LIPITOR) tablet 80 mg  80 mg Oral QHS    lisinopril (PRINIVIL, ZESTRIL) tablet 40 mg  40 mg Oral DAILY    sodium chloride (NS) flush 5-40 mL  5-40 mL IntraVENous PRN    ondansetron (ZOFRAN) injection 4 mg  4 mg IntraVENous Q4H PRN    aspirin chewable tablet 81 mg  81 mg Oral DAILY    enoxaparin (LOVENOX) injection 40 mg  40 mg SubCUTAneous Q24H    dextrose 40% (GLUTOSE) oral gel 1 Tube  15 g Oral PRN    glucagon (GLUCAGEN) injection 1 mg  1 mg IntraMUSCular PRN    dextrose (D50W) injection syrg 12.5-25 g  25-50 mL IntraVENous PRN       Other Studies (last 24 hours):  No results found.     Assessment and Plan:     Hospital Problems as of 2/9/2020 Date Reviewed: 3/3/2017          Codes Class Noted - Resolved POA    PFO (patent foramen ovale) ICD-10-CM: Q21.1  ICD-9-CM: 745.5  12/17/2019 - Present Yes        Arrhythmia ICD-10-CM: I49.9  ICD-9-CM: 427.9  12/15/2019 - Present Yes        Hyperlipemia ICD-10-CM: E78.5  ICD-9-CM: 272.4  12/15/2019 - Present Yes        * (Principal) Acute embolic stroke [facility]) LLV-37-NM: I63.9  ICD-9-CM: 434.11  12/12/2019 - Present Yes        Hypertension (Chronic) ICD-10-CM: I10  ICD-9-CM: 401.9  Unknown - Present Yes        Type 2 diabetes mellitus (HCC) (Chronic) ICD-10-CM: E11.9  ICD-9-CM: 250.00  Unknown - Present Yes              PLAN:    Acute embolic stroke  -Dysphagia on mechanical soft diet  -MRI head: with acute infarcts in L cerebellar hemisphere, deep frontoparietal white matter, and R paramedian yariel.  Also noted old lacunar infarcts. -ISMAEL: 3 mm PFO, Cardiology stated he needs an evaluation for closure PFO with high risk features including significant (3 mm) separation of septum secundum and primum as well as large shunt.  Will await recovery of CVA.  Plan 4 week event monitor at discharge.  Will then see in offfice and if no arrythmias will plan PFO closure  -statin, ASA  -PT/OT/Speech   -will check labs periodicly as last check was 2/5/2020 and was unremarkable     Hypertension: continue home meds      Type 2 diabetes mellitus:  Metformin, TIDAC sugar checks      Left shoulder pain, resolved (2/2)  -Tylenol every 6 hours scheduled as patient is frequently asking for it for left shoulder pain.        Arrhythmia: had brief wide complex tachycardia early in admission, will need event monitor as outpt per cards     DVT Prophylaxis: lovenox  DISPO: Difficult placement, case management working on plan. Patient has no medical insurance. Can't go home as wife is disabled and is not safe at home without supervision as he needs long term care.     Signed:  Maris Mayen MD

## 2020-02-10 LAB — GLUCOSE BLD STRIP.AUTO-MCNC: 78 MG/DL (ref 65–100)

## 2020-02-10 PROCEDURE — 74011250636 HC RX REV CODE- 250/636: Performed by: INTERNAL MEDICINE

## 2020-02-10 PROCEDURE — 65270000029 HC RM PRIVATE

## 2020-02-10 PROCEDURE — 74011250637 HC RX REV CODE- 250/637: Performed by: HOSPITALIST

## 2020-02-10 PROCEDURE — 74011250637 HC RX REV CODE- 250/637: Performed by: INTERNAL MEDICINE

## 2020-02-10 PROCEDURE — 97112 NEUROMUSCULAR REEDUCATION: CPT

## 2020-02-10 PROCEDURE — 82962 GLUCOSE BLOOD TEST: CPT

## 2020-02-10 PROCEDURE — 97535 SELF CARE MNGMENT TRAINING: CPT

## 2020-02-10 RX ADMIN — GUAIFENESIN 100 MG: 100 SOLUTION ORAL at 22:12

## 2020-02-10 RX ADMIN — ACETAMINOPHEN 650 MG: 325 TABLET, FILM COATED ORAL at 05:06

## 2020-02-10 RX ADMIN — METOPROLOL SUCCINATE 25 MG: 25 TABLET, FILM COATED, EXTENDED RELEASE ORAL at 09:32

## 2020-02-10 RX ADMIN — ACETAMINOPHEN 650 MG: 325 TABLET, FILM COATED ORAL at 17:08

## 2020-02-10 RX ADMIN — ATORVASTATIN CALCIUM 80 MG: 80 TABLET, FILM COATED ORAL at 22:12

## 2020-02-10 RX ADMIN — ASPIRIN 81 MG 81 MG: 81 TABLET ORAL at 09:32

## 2020-02-10 RX ADMIN — LISINOPRIL 40 MG: 20 TABLET ORAL at 09:32

## 2020-02-10 RX ADMIN — METFORMIN HYDROCHLORIDE 500 MG: 500 TABLET, FILM COATED ORAL at 17:08

## 2020-02-10 RX ADMIN — ENOXAPARIN SODIUM 40 MG: 40 INJECTION SUBCUTANEOUS at 14:42

## 2020-02-10 RX ADMIN — ACETAMINOPHEN 650 MG: 325 TABLET, FILM COATED ORAL at 12:05

## 2020-02-10 RX ADMIN — METFORMIN HYDROCHLORIDE 500 MG: 500 TABLET, FILM COATED ORAL at 09:32

## 2020-02-10 NOTE — PROGRESS NOTES
02/09/20 1900   NIH Stroke Scale   Interval Other (comment)   LOC 0   LOC Questions 0   LOC Commands 0   Best Gaze 0   Visual 0   Facial Palsy 1   Motor Right Arm 0   Motor Left Arm 2   Motor Right Leg 0   Motor Left Leg 2   Limb Ataxia 0   Sensory 0   Best Language 1   Dysarthria 1   Extinction and Inattention 0   Total 7   Dual NIH with Crissie Calk

## 2020-02-10 NOTE — PROGRESS NOTES
Hospitalist Progress Note     Admit Date:  2019  9:07 AM   Name:  Jenine Oppenheim   Age:  53 y.o.  :  1975   MRN:  [id number]   PCP:  Rashawn Ferguson MD  Treatment Team: Attending Provider: Vivien Rosas MD; Care Manager: Lisa Jimenez RN; Care Manager: Valiant Essex, ROB; Consulting Provider: Gregorio Macias MD; Utilization Review: Miroslava Wood RN; Nurse Practitioner: Joey Calle NP; Primary Nurse: Enedina Terry; Physical Therapist: Loren Viramontes DPT; Occupational Therapist: Britta Veras OT    Subjective: This is a 54 yo male with PMH of DM II, HTN who presented 19 with c/o left leg and arm weakness, left facial droop and dysarthria.  Code S called. MRI with acute infarctions in left cerebellar hemisphere, deep frontoparietal white matter and right paramedian yariel.  Also noted to have old lacunar infarcts. CTA without large vessel occlusions, however some stenosis noted.  Echo showed PFO, Cardiology to f/u outpatient for evaluation for closure.  Neurology consulted.  Started on ASA, statin.  Pt is uninsured, difficult placement, case management working on plan.      10 - resting comfortably. No complaints. No new issues. No pain, fevers    Objective:     Patient Vitals for the past 24 hrs:   Temp Pulse Resp BP SpO2   02/10/20 1200 98.2 °F (36.8 °C) 66 16 138/87 97 %   02/10/20 0800 97.7 °F (36.5 °C) 72 16 135/86 97 %   02/10/20 0400 98.6 °F (37 °C) 62 18 126/88 98 %   02/10/20 0000 98.4 °F (36.9 °C) 62 18 148/64 99 %   20 2000 98 °F (36.7 °C) 66 18 137/85 99 %   20 1600 98 °F (36.7 °C) 65 18 122/74 99 %     Oxygen Therapy  O2 Sat (%): 97 % (02/10/20 1200)  Pulse via Oximetry: 66 beats per minute (20 0000)  O2 Device: Room air (02/01/20 0000)    Intake/Output Summary (Last 24 hours) at 2/10/2020 1324  Last data filed at 2/10/2020 0829  Gross per 24 hour   Intake 360 ml   Output    Net 360 ml         General:    Well nourished. Alert. Extremities: Warm and dry. No cyanosis or edema. Skin:     No rashes or jaundice. Data Review:  I have reviewed all labs, meds, telemetry events, and studies from the last 24 hours. Recent Results (from the past 24 hour(s))   GLUCOSE, POC    Collection Time: 02/10/20  6:53 AM   Result Value Ref Range    Glucose (POC) 78 65 - 100 mg/dL        All Micro Results     None          Current Meds:  Current Facility-Administered Medications   Medication Dose Route Frequency    lip protectant (BLISTEX) ointment 1 Each  1 Each Topical PRN    acetaminophen (TYLENOL) tablet 650 mg  650 mg Oral Q6H    metFORMIN (GLUCOPHAGE) tablet 500 mg  500 mg Oral BID WITH MEALS    metoprolol succinate (TOPROL-XL) XL tablet 25 mg  25 mg Oral DAILY    polyethylene glycol (MIRALAX) packet 17 g  17 g Oral DAILY PRN    guaiFENesin (ROBITUSSIN) 100 mg/5 mL oral liquid 100 mg  100 mg Oral Q4H PRN    hydrALAZINE (APRESOLINE) 20 mg/mL injection 20 mg  20 mg IntraVENous Q4H PRN    atorvastatin (LIPITOR) tablet 80 mg  80 mg Oral QHS    lisinopril (PRINIVIL, ZESTRIL) tablet 40 mg  40 mg Oral DAILY    sodium chloride (NS) flush 5-40 mL  5-40 mL IntraVENous PRN    ondansetron (ZOFRAN) injection 4 mg  4 mg IntraVENous Q4H PRN    aspirin chewable tablet 81 mg  81 mg Oral DAILY    enoxaparin (LOVENOX) injection 40 mg  40 mg SubCUTAneous Q24H    dextrose 40% (GLUTOSE) oral gel 1 Tube  15 g Oral PRN    glucagon (GLUCAGEN) injection 1 mg  1 mg IntraMUSCular PRN    dextrose (D50W) injection syrg 12.5-25 g  25-50 mL IntraVENous PRN       Other Studies (last 24 hours):  No results found.     Assessment and Plan:     Hospital Problems as of 2/10/2020 Date Reviewed: 3/3/2017          Codes Class Noted - Resolved POA    PFO (patent foramen ovale) ICD-10-CM: Q21.1  ICD-9-CM: 745.5  12/17/2019 - Present Yes        Arrhythmia ICD-10-CM: I49.9  ICD-9-CM: 427.9  12/15/2019 - Present Yes        Hyperlipemia ICD-10-CM: E78.5  ICD-9-CM: 272.4 12/15/2019 - Present Yes        * (Principal) Acute embolic stroke [facility]) HDB-93-SF: I63.9  ICD-9-CM: 434.11  12/12/2019 - Present Yes        Hypertension (Chronic) ICD-10-CM: I10  ICD-9-CM: 401.9  Unknown - Present Yes        Type 2 diabetes mellitus (HCC) (Chronic) ICD-10-CM: E11.9  ICD-9-CM: 250.00  Unknown - Present Yes              PLAN:    Acute embolic stroke  -Dysphagia on mechanical soft diet  -MRI head: with acute infarcts in L cerebellar hemisphere, deep frontoparietal white matter, and R paramedian yariel.  Also noted old lacunar infarcts. -ISMAEL: 3 mm PFO, Cardiology stated he needs an evaluation for closure PFO with high risk features including significant (3 mm) separation of septum secundum and primum as well as large shunt.  Will await recovery of CVA.  Plan is 4 week event monitor as outpatient.  Will then see in offfice and if no arrythmias will plan PFO closure  -statin, ASA  -is \"outpatient ready\" so no routine labs indicated     Hypertension:   -lisinopril, metoprolol     Type 2 diabetes mellitus:    -cont current    DISPO: waiting for pt to be deemed \"disabled\" which in neurologic cases have to allow for 6 months showing chronicity. Then he can get medicaid after that.     Signed:  Ca Jackson MD

## 2020-02-10 NOTE — PROGRESS NOTES
Problem: Self Care Deficits Care Plan (Adult)  Goal: *Acute Goals and Plan of Care (Insert Text)  Description  GOALS UPDATED 1/22/2020:   1. Patient will complete dynamic sitting balance for ADLs with supervision. PROGRESSING 2/9/2020  2. Patient will demonstrate appropriate safety awareness and protection of L UE during bed mobility and functional transfers with minimal cues. (Progressing)  3. Patient will complete total body bathing and dressing with moderate assistance and adaptive equipment as needed. PROGRESSING 2/9/2020  4. Patient will complete weightbearing into the L UE with ADL tasks with minimal assistance to improve ability to use as a functional assist during ADL tasks. (Progressing)  5. Patient will demonstrate modified independence with therapeutic exercises to improve strength in L UE for ADLs/functional transfers. (Progressing)  6. Patient will complete bed mobility with stand by assistance and adaptive equipment as needed in preparation for functional transfers. PROGRESSING 2/9/2020  7. 7. Patient will complete functional transfers with minimal assistance with equipment as needed. (New goal)8. (Goal Met)    Timeframe: 7 visits       Outcome: Progressing Towards Goal     OCCUPATIONAL THERAPY: Daily Note and AM    2/10/2020  INPATIENT: OT Visit Days: 4  Payor: MEDICAID PENDING / Plan: [place] PENDING / Product Type: Medicaid /      NAME/AGE/GENDER: Yo Lind is a 53 y.o. male   PRIMARY DIAGNOSIS:  Acute ischemic stroke ([facility] [I63.9]  Cerebrovascular accident (CVA) due to embolism ([facility] [R51.1] Acute embolic stroke ([facility] Acute embolic stroke ([facility]       ICD-10: Treatment Diagnosis:    · Generalized Muscle Weakness (M62.81)  · Other lack of cordination (R27.8)  · Hemiplegia and hemiparesis following cerebral infarction affecting   · left non-dominant side (I69.354)  · Abnormal posture (R29.3)   Precautions/Allergies:    NO PULLING ON LUE   LUE in sling with shoulder joint approximated and supported   Patient has no known allergies. ASSESSMENT:     Mr. Jonathon Smith is a 53 y.o. male presents to the hospital with L sided weakness and acute ischemic CVA. MRI revealed acute to subacute L cerebellar and R paramedian yariel infarcts. At baseline pt lives with his wife and is independent. One finger wide subluxation noted in L shoulder (1/22/2020). 2/10/2020  Pt found supine in bed upon arrival, alert and agreeable to OT treatment. Patient reported that his shoulder is without pain. Pt not wearing sling upon entrance, and when asked, he said that his sling was dirty. Rehab Tech requested that Nursing order one for patient, and sling came at end of session. Pt practiced bed mobility with ModA, sitting balance fair to good. Applied KT to L shoulder for support and prevention of pain, I strip to L Upper Trap mm, X strip to L rhomboid mm, and I strip to wrap around shoulder for support. Worked on L UE weight bearing with weight shift. Minimal active mvt noted in L upper trap mm. Tried facilitating manually L biceps & L triceps, no active mvt noted this date. Patient put deodorant onto L underarm using R UE with moderate assistance for luke compensatory technique. Worked on sit to stand with moderate assistance, and patient took side steps towards [facility] with verbal & manual cueing to shift L foot sideways & to put weight via L LE with L knee input to prevent hyperextension/buckling when moving R LE. Patient returned to supine with moderate assistance, and placed sling onto patient. Progress is slow, though steady. Patient very motivated with therapy tasks. Cont OT per tx plan. This section established at most recent assessment   PROBLEM LIST (Impairments causing functional limitations):  1. Decreased Strength  2. Decreased ADL/Functional Activities  3. Decreased Transfer Abilities  4. Decreased Ambulation Ability/Technique  5. Decreased Balance  6. Decreased Activity Tolerance  7.  Increased Fatigue  8. Decreased Flexibility/Joint Mobility  9. Decreased Knowledge of Precautions  10. Decreased [place] with Home Exercise Program   INTERVENTIONS PLANNED: (Benefits and precautions of occupational therapy have been discussed with the patient.)  1. Activities of daily living training  2. Adaptive equipment training  3. Balance training  4. Clothing management  5. Cognitive training  6. Donning&doffing training  7. Keanu tech training  8. Neuromuscular re-eduation  9. Therapeutic activity  10. Therapeutic exercise     TREATMENT PLAN: Frequency/Duration: Follow patient 3 times per week to address above goals. Rehabilitation Potential For Stated Goals: Excellent     REHAB RECOMMENDATIONS (at time of discharge pending progress):    Placement: It is my opinion, based on this patient's performance to date, that Mr. Latrell Collins may benefit from intensive therapy at an [address] [place] [address] after discharge due to potential to make ongoing and sustainable functional improvement that is of practical value. Joan Acevedo Pt functioning far below independent baseline, demonstrating good improvement and participation. Pt would likely benefit greatly and increase independence from inpatient rehab stay. Equipment:    TBD               OCCUPATIONAL PROFILE AND HISTORY:   History of Present Injury/Illness (Reason for Referral):  See H&P  Past Medical History/Comorbidities:   Mr. Latrell Collins  has a past medical history of Acute ischemic stroke ([address] (12/12/2019), Acute pancreatitis (11/19/2014), Cerebrovascular accident (CVA) due to embolism ([address] (12/12/2019), Diabetes ([address] (2002), Diabetes ([address] Diabetes mellitus, and Hypertension.  He also has no past medical history of Arthritis, Asthma, Autoimmune disease ([address] CAD (coronary artery disease), Cancer ([address] Chronic kidney disease, COPD, Dementia, Dementia ([address] Heart failure ([address] Ill-defined condition, Infectious disease, Liver disease, Other ill-defined conditions(799.89), Psychiatric disorder, PUD (peptic ulcer disease), Seizures ([address] or Sleep disorder. Mr. Breanna Colbert  has a past surgical history that includes hx hernia repair and hx orthopaedic. Social History/Living Environment:   Home Environment: Apartment  # Steps to Enter: 12  Rails to Enter: Yes  [employer] : Bilateral  One/Two Story Residence: One story  Living Alone: No  Support Systems: Spouse/Significant Other/Partner  Patient Expects to be Discharged to[de-identified] Unknown  Current DME Used/Available at Home: None  Tub or Shower Type: Tub/Shower combination  Prior Level of Function/Work/Activity:  Pt lives at home with his wife. Pt is typically independent with ADL/functional mobility. Pt does not drive. Pt was working part-time at [employer]. Personal Factors:          Age:  53 y.o. Past/Current Experience:  CVA with flaccid L side        Other factors that influence how disability is experienced by the patient:  multiple co-morbidities    Number of Personal Factors/Comorbidities that affect the Plan of Care: Extensive review of physical, cognitive, and psychosocial performance (3+):  HIGH COMPLEXITY   ASSESSMENT OF OCCUPATIONAL PERFORMANCE[de-identified]   Activities of Daily Living:   Basic ADLs (From Assessment) Complex ADLs (From Assessment)   Feeding: Setup  Oral Facial Hygiene/Grooming: Moderate assistance  Bathing: Moderate assistance  Upper Body Dressing: Maximum assistance  Lower Body Dressing: Total assistance  Toileting: Total assistance Instrumental ADL  Meal Preparation: Total assistance  Homemaking: Total assistance   Grooming/Bathing/Dressing Activities of Daily Living         Upper Body Bathing  Bathing Assistance:  Moderate assistance (minimal for bathing LUE)  Position Performed: Seated edge of bed                         Most Recent Physical Functioning:   Gross Assessment:                  Posture:     Balance:    Bed Mobility:     Wheelchair Mobility:     Transfers:               Patient Vitals for the past 6 hrs:   BP BP Patient Position SpO2 Pulse   02/10/20 1200 138/87 At rest 97 % 66       Mental Status  Neurologic State: Alert  Orientation Level: Oriented X4  Cognition: Follows commands  Perception: Cues to maintain midline in sitting, Cues to maintain midline in standing(pt fatigued this afternoon)  Perseveration: No perseveration noted  Safety/Judgement: Awareness of environment, Fall prevention, Decreased insight into deficits                          Physical Skills Involved:  1. Range of Motion  2. Balance  3. Strength  4. Activity Tolerance  5. Fine Motor Control  6. Gross Motor Control Cognitive Skills Affected (resulting in the inability to perform in a timely and safe manner):  1. Perception  2. Expression Psychosocial Skills Affected:  1. Habits/Routines  2. Environmental Adaptation  3. Social Interaction  4. Emotional Regulation  5. Self-Awareness  6. Awareness of Others  7. Social Roles   Number of elements that affect the Plan of Care: 5+:  HIGH COMPLEXITY   CLINICAL DECISION MAKIN[address] [place] [id number] AM-PAC 6 Clicks   Daily Activity Inpatient Short Form  How much help from another person does the patient currently need. .. Total A Lot A Little None   1. Putting on and taking off regular lower body clothing? [x] 1   [] 2   [] 3   [] 4   2. Bathing (including washing, rinsing, drying)? [] 1   [x] 2   [] 3   [] 4   3. Toileting, which includes using toilet, bedpan or urinal?   [] 1   [x] 2   [] 3   [] 4   4. Putting on and taking off regular upper body clothing? [] 1   [x] 2   [] 3   [] 4   5. Taking care of personal grooming such as brushing teeth? [] 1   [x] 2   [] 3   [] 4   6. Eating meals? [] 1   [] 2   [x] 3   [] 4   © , Trustees of [address] [place] [id number], under license to [employer].  All rights reserved      Score:  Initial: 11 Most Recent: 12 (Date: 2020 )    Interpretation of Tool:  Represents activities that are increasingly more difficult (i.e. Bed mobility, Transfers, Gait). Medical Necessity:     · Patient demonstrates   · good and excellent  ·  rehab potential due to higher previous functional level. Reason for Services/Other Comments:  · Patient continues to require skilled intervention due to   · Decreased independence with ADL/functional transfers that impacts overall quality of life. · .   Use of outcome tool(s) and clinical judgement create a POC that gives a: MODERATE COMPLEXITY         TREATMENT:   (In addition to Assessment/Re-Assessment sessions the following treatments were rendered)     Pre-treatment Symptoms/Complaints:    Pain: Initial:   Pain Intensity 1: 0  Post Session:  0   Self Care: (8 minutes): Procedure(s) (per grid) utilized to improve and/or restore self-care/home management as related to putting on deodorant. Required moderate visual, verbal and manual cueing to facilitate activities of daily living skills and compensatory activities. Neuromuscular Re-education: ( 33 minutes):  Exercise/activities per grid below to improve balance, coordination, kinesthetic sense, posture and proprioception. Required moderate verbal, manual and tactile cues to work on L UE weight bearing with weight shift. .                   Date:  1/27/2020 Date:   Date:     Activity/Exercise Parameters Parameters Parameters   LUE finger flexion, AROM gravity minimized then AAROM to complete ROM 2x10 reps     LUE finger extension AAROM 2x10 reps     LUE wrist extension, AROM gravity minimized then AAROM to complete ROM 1x10 reps     LUE wrist extension, AROM against gravity  1x10 reps     LUE wrist flexion AAROM 2x10 reps     LUE elbow flexion/extension PROM x10 reps     LUE shoulder flexion PROM x10 reps          Braces/Orthotics/Lines/Etc:   · LUE sling   · O2 device: Room air  · Treatment/Session Assessment:    · Response to Treatment:  Pt demonstrated good participation.    · Interdisciplinary Collaboration:   o Occupational Therapist  o Rehabilitation Attendant  · After treatment position/precautions:   o Supine in bed  o Bed/Chair-wheels locked  o Bed in low position  o Call light within reach  o RN notified   · Compliance with Program/Exercises: Compliant all of the time, Will assess as treatment progresses. · Recommendations/Intent for next treatment session: \"Next visit will focus on advancements to more challenging activities and reduction in assistance provided\".   Total Treatment Duration:  OT Patient Time In/Time Out  Time In: 1332  Time Out: [address] [place]

## 2020-02-10 NOTE — INTERDISCIPLINARY ROUNDS
Interdisciplinary team rounds were held 2/10/2020 with the following team members:  Care Management, Occupational Therapy, Physician and . Plan of care discussed. See clinical pathway and/or care plan for interventions and desired outcomes.

## 2020-02-10 NOTE — PROGRESS NOTES
02/10/20 0658   NIH Stroke Scale   Interval Other (comment)   LOC 0   LOC Questions 0   LOC Commands 0   Best Gaze 0   Visual 0   Facial Palsy 1   Motor Right Arm 0   Motor Left Arm 2   Motor Right Leg 0   Motor Left Leg 2   Limb Ataxia 0   Sensory 0   Best Language 1   Dysarthria 1   Extinction and Inattention 0   Total 7   Dual NIH w/ Pegge Myles

## 2020-02-11 LAB — GLUCOSE BLD STRIP.AUTO-MCNC: 89 MG/DL (ref 65–100)

## 2020-02-11 PROCEDURE — 97110 THERAPEUTIC EXERCISES: CPT

## 2020-02-11 PROCEDURE — 74011250637 HC RX REV CODE- 250/637: Performed by: INTERNAL MEDICINE

## 2020-02-11 PROCEDURE — 97530 THERAPEUTIC ACTIVITIES: CPT

## 2020-02-11 PROCEDURE — 74011250636 HC RX REV CODE- 250/636: Performed by: INTERNAL MEDICINE

## 2020-02-11 PROCEDURE — 65270000029 HC RM PRIVATE

## 2020-02-11 PROCEDURE — 74011250637 HC RX REV CODE- 250/637: Performed by: HOSPITALIST

## 2020-02-11 PROCEDURE — 92507 TX SP LANG VOICE COMM INDIV: CPT

## 2020-02-11 PROCEDURE — 82962 GLUCOSE BLOOD TEST: CPT

## 2020-02-11 RX ADMIN — ENOXAPARIN SODIUM 40 MG: 40 INJECTION SUBCUTANEOUS at 13:30

## 2020-02-11 RX ADMIN — ATORVASTATIN CALCIUM 80 MG: 80 TABLET, FILM COATED ORAL at 21:09

## 2020-02-11 RX ADMIN — ASPIRIN 81 MG 81 MG: 81 TABLET ORAL at 08:46

## 2020-02-11 RX ADMIN — ACETAMINOPHEN 650 MG: 325 TABLET, FILM COATED ORAL at 17:02

## 2020-02-11 RX ADMIN — ACETAMINOPHEN 650 MG: 325 TABLET, FILM COATED ORAL at 05:45

## 2020-02-11 RX ADMIN — ACETAMINOPHEN 650 MG: 325 TABLET, FILM COATED ORAL at 11:51

## 2020-02-11 RX ADMIN — ACETAMINOPHEN 650 MG: 325 TABLET, FILM COATED ORAL at 00:08

## 2020-02-11 RX ADMIN — LISINOPRIL 40 MG: 20 TABLET ORAL at 08:46

## 2020-02-11 RX ADMIN — ACETAMINOPHEN 650 MG: 325 TABLET, FILM COATED ORAL at 23:44

## 2020-02-11 RX ADMIN — METFORMIN HYDROCHLORIDE 500 MG: 500 TABLET, FILM COATED ORAL at 08:46

## 2020-02-11 RX ADMIN — METFORMIN HYDROCHLORIDE 500 MG: 500 TABLET, FILM COATED ORAL at 17:02

## 2020-02-11 RX ADMIN — METOPROLOL SUCCINATE 25 MG: 25 TABLET, FILM COATED, EXTENDED RELEASE ORAL at 08:46

## 2020-02-11 RX ADMIN — GUAIFENESIN 100 MG: 100 SOLUTION ORAL at 19:07

## 2020-02-11 NOTE — PROGRESS NOTES
Neurolinguistic Goals:  LTG: Patient will increase neuro-linguistic abilities to increase safety and awareness in functional living environment   STG: Patient will solve mathematical problems with 80%accuracy given minimal cueing   STG: Patient will name 10 items to concrete category in 1 minute given minimal cueing. Progressing 1/31/2020  STG: Patient will participate in verbal reasoning tasks with 80% accuracy given minimal cueing  STG: Patient will complete mental manipulation tasks with 80% accuracy given minimal cueing  STG: Patient will complete working memory/attention tasks with 80% accuracy given minimal cueing  STG: Patient will recall relevant verbal information with 80% accuracy with minimal assistance  STG: Patient will utilize memory compensatory strategies to improve recall of functional list/message with 90%accuracy given minimal assistance  STG: Patient will participate in ongoing cognitive linguistic evaluation for therapeutic assessment. MET 1/31/2020     Dysarthria Goals:  LTG: Patient will develop functional and intelligible speech and utilize compensatory strategies to improve communication across environments  STG: Patient will utilize compensatory strategies at conversation level with 90% accuracy independently. Added 1/31/2020    SPEECH LANGUAGE PATHOLOGY: SPEECH-LANGUAGE/COGNITION: Daily Note 3    NAME/AGE/GENDER: Hazel Waldron is a 53 y.o. male  DATE: 2/11/2020  PRIMARY DIAGNOSIS: Acute ischemic stroke ([facility] [I63.9]  Cerebrovascular accident (CVA) due to embolism ([facility] [facility] [I63.9]      ICD-10: Treatment Diagnosis: R47.1 Dysarthria and Anarthria  R41.841 Cognitive-Communication Deficit    INTERDISCIPLINARY COLLABORATION: n/a  PRECAUTIONS/ALLERGIES: Patient has no known allergies. SUBJECTIVE   Resting, easily roused. Family present. Problem List:  (Impairments causing functional limitations):  1. Dysarthria  2.  Cognitive linguistic impaired     Orientation: Person  Place  Time  Situation     Pain: Pain Scale 1: Numeric (0 - 10)  Pain Intensity 1: 0     OBJECTIVE   The following treatment tasks were completed to address attention and memory. Reasoning-written directions exclusion task. 8/10 independently  9/10 minimal cueing for reasoning   10/10 with max cueing for reasoning            ASSESSMENT   Ongoing attention, memory, and higher level reasoning deficits. Patient able to complete exclusion task with basic reasoning, however difficulty with higher level reasoning. Unable to identify words with multiple meanings with moderate semantic cues, ultimately requiring max cueing. Patient currently functioning below baseline. Will continue to follow for cognitive linguistic treatment to improve functional independence/communication. Tool Used: MODIFIED BRITT SCALE (mRS)   Score   No Symptoms  [] 0   No significant disability despite symptoms; able to carry out all usual duties and activities  [] 1   Slight disability; unable to carry out all previous activities but able to look after own affairs without assistance. [x] 2   Moderate disability; requiring some help but able to walk without assistance  [] 3   Moderately severe disability; unable to walk without assistance and unable to attend to own bodily needs without assistance  [] 4   Severe disability; bedridden, incontinent, and requiring constant nursing care and attention  [] 5      Score:  Initial: 2 Most Recent:  (Date 02/11/20 )   Interpretation of Tool: The Modified [place] Scale is a 7-point scaled used to quantify level of disability as it relates to a patient's functional abilities. PLAN    FREQUENCY/DURATION: Continue to follow patient 2 times a week for duration of hospital stay to address above goals.   - Recommendations for next treatment session: Next treatment will address cognition   REHABILITATION POTENTIAL FOR STATED GOALS: Good           RECOMMENDATIONS   STRATEGIES: · Memory strategies  · Dysarthria strategies     EDUCATION:  · Recommendations discussed with Patient and wife     RECOMMENDATIONS for CONTINUED SPEECH THERAPY: YES: Anticipate need for ongoing speech therapy during this hospitalization and at next level of care. Compliance with Program/Exercises: Will assess as treatment progresses  Continuation of Skilled Services/Medical Necessity:   Patient is expected to demonstrate progress in cognitive ability to decrease assistance required communication, increase independence with activities of daily living and increase communication with family/caregivers.  Patient continues to require skilled intervention due to impaired cognitive linguistic abilities.      SAFETY:  After treatment position/precautions:  · Upright in bed  · Call light within reach    Total Treatment Duration:   Time In: 1304  Time Out: [facility], [facility] MEDICO DEL [facility], [facility], [address] [place] [address]

## 2020-02-11 NOTE — PROGRESS NOTES
02/11/20 0701   NIH Stroke Scale   Interval Other (comment)  (dual NIH with )   LOC 0   LOC Questions 0   LOC Commands 0   Best Gaze 0   Visual 0   Facial Palsy 1   Motor Right Arm 0   Motor Left Arm 2   Motor Right Leg 0   Motor Left Leg 2   Limb Ataxia 0   Sensory 0   Best Language 1   Dysarthria 1   Extinction and Inattention 0   Total 7

## 2020-02-11 NOTE — PROGRESS NOTES
Problem: Self Care Deficits Care Plan (Adult)  Goal: *Acute Goals and Plan of Care (Insert Text)  Description  GOALS UPDATED 1/22/2020:   1. Patient will complete dynamic sitting balance for ADLs with supervision. PROGRESSING 2/9/2020  2. Patient will demonstrate appropriate safety awareness and protection of L UE during bed mobility and functional transfers with minimal cues. (Progressing)  3. Patient will complete total body bathing and dressing with moderate assistance and adaptive equipment as needed. PROGRESSING 2/9/2020  4. Patient will complete weightbearing into the L UE with ADL tasks with minimal assistance to improve ability to use as a functional assist during ADL tasks. (Progressing)  5. Patient will demonstrate modified independence with therapeutic exercises to improve strength in L UE for ADLs/functional transfers. (Progressing)  6. Patient will complete bed mobility with stand by assistance and adaptive equipment as needed in preparation for functional transfers. PROGRESSING 2/9/2020  7. 7. Patient will complete functional transfers with minimal assistance with equipment as needed. (New goal)8. (Goal Met)    Timeframe: 7 visits       Outcome: Progressing Towards Goal     OCCUPATIONAL THERAPY: Daily Note and PM    2/11/2020  INPATIENT: OT Visit Days: 5  Payor: MEDICAID PENDING / Plan: Mendez Johnson PENDING / Product Type: Medicaid /      NAME/AGE/GENDER: Manjeet Clifford is a 53 y.o. male   PRIMARY DIAGNOSIS:  Acute ischemic stroke ([facility] [I63.9]  Cerebrovascular accident (CVA) due to embolism ([facility] [address] [R78.4] Acute embolic stroke ([facility] [address] Acute embolic stroke ([facility] [address]       ICD-10: Treatment Diagnosis:    · Generalized Muscle Weakness (M62.81)  · Other lack of cordination (R27.8)  · Hemiplegia and hemiparesis following cerebral infarction affecting   · left non-dominant side (I69.354)  · Abnormal posture (R29.3)   Precautions/Allergies:    NO PULLING ON LUE   LUE in sling with shoulder joint approximated and supported   Patient has no known allergies. ASSESSMENT:     Mr. Breanna Colbert is a 53 y.o. male presents to the hospital with L sided weakness and acute ischemic CVA. MRI revealed acute to subacute L cerebellar and R paramedian yariel infarcts. At baseline pt lives with his wife and is independent. One finger wide subluxation noted in L shoulder (1/22/2020). 2/11/2020  Pt presents in supine upon arrival. Pt transferred to sitting with mod a and additional time. Pt tolerated PROM to his LUE with no c/o pain. KI still intact and pt educated that he can leave off sling when in bed and that to only use it for transfers. Pt has notably had the sling on several occasions while in bed. Pt completed sit to stand with mod a and completed side steps up and down along side of the bed with mod a and additional time. Pt was assisted with donning a clean gown with max a and then assisted back to supine with mod a. Good effort. Cont OT per tx plan. This section established at most recent assessment   PROBLEM LIST (Impairments causing functional limitations):  1. Decreased Strength  2. Decreased ADL/Functional Activities  3. Decreased Transfer Abilities  4. Decreased Ambulation Ability/Technique  5. Decreased Balance  6. Decreased Activity Tolerance  7. Increased Fatigue  8. Decreased Flexibility/Joint Mobility  9. Decreased Knowledge of Precautions  10. Decreased [place] with Home Exercise Program   INTERVENTIONS PLANNED: (Benefits and precautions of occupational therapy have been discussed with the patient.)  1. Activities of daily living training  2. Adaptive equipment training  3. Balance training  4. Clothing management  5. Cognitive training  6. Donning&doffing training  7. Keanu tech training  8. Neuromuscular re-eduation  9. Therapeutic activity  10. Therapeutic exercise     TREATMENT PLAN: Frequency/Duration: Follow patient 3 times per week to address above goals.   Rehabilitation Potential For Stated Goals: Excellent     REHAB RECOMMENDATIONS (at time of discharge pending progress):    Placement: It is my opinion, based on this patient's performance to date, that Mr. Sherine Joshi may benefit from intensive therapy at an [address] [place] [address] after discharge due to potential to make ongoing and sustainable functional improvement that is of practical value. Martha Leader Pt functioning far below independent baseline, demonstrating good improvement and participation. Pt would likely benefit greatly and increase independence from inpatient rehab stay. Equipment:    TBD               OCCUPATIONAL PROFILE AND HISTORY:   History of Present Injury/Illness (Reason for Referral):  See H&P  Past Medical History/Comorbidities:   Mr. Sherine Joshi  has a past medical history of Acute ischemic stroke ([address] (12/12/2019), Acute pancreatitis (11/19/2014), Cerebrovascular accident (CVA) due to embolism ([address] (12/12/2019), Diabetes ([address] (2002), Diabetes ([address] Diabetes mellitus, and Hypertension. He also has no past medical history of Arthritis, Asthma, Autoimmune disease ([address] CAD (coronary artery disease), Cancer ([address] Chronic kidney disease, COPD, Dementia, Dementia ([address] Heart failure ([address] Ill-defined condition, Infectious disease, Liver disease, Other ill-defined conditions(799.89), Psychiatric disorder, PUD (peptic ulcer disease), Seizures ([address] or Sleep disorder. Mr. Sherine Joshi  has a past surgical history that includes hx hernia repair and hx orthopaedic. Social History/Living Environment:   Home Environment: Apartment  # Steps to Enter: 12  Rails to Enter: Yes  [employer] : Bilateral  One/Two Story Residence: One story  Living Alone: No  Support Systems: Spouse/Significant Other/Partner  Patient Expects to be Discharged to[de-identified] Unknown  Current DME Used/Available at Home: None  Tub or Shower Type: Tub/Shower combination  Prior Level of Function/Work/Activity:  Pt lives at home with his wife.  Pt is typically independent with ADL/functional mobility. Pt does not drive. Pt was working part-time at [employer]. Personal Factors:          Age:  53 y.o. Past/Current Experience:  CVA with flaccid L side        Other factors that influence how disability is experienced by the patient:  multiple co-morbidities    Number of Personal Factors/Comorbidities that affect the Plan of Care: Extensive review of physical, cognitive, and psychosocial performance (3+):  HIGH COMPLEXITY   ASSESSMENT OF OCCUPATIONAL PERFORMANCE[de-identified]   Activities of Daily Living:   Basic ADLs (From Assessment) Complex ADLs (From Assessment)   Feeding: Setup  Oral Facial Hygiene/Grooming: Moderate assistance  Bathing: Moderate assistance  Upper Body Dressing: Maximum assistance  Lower Body Dressing: Total assistance  Toileting: Total assistance Instrumental ADL  Meal Preparation: Total assistance  Homemaking: Total assistance   Grooming/Bathing/Dressing Activities of Daily Living         Upper Body Bathing  Bathing Assistance: Moderate assistance (minimal for bathing LUE)  Position Performed: Seated edge of bed                   Bed/Mat Mobility  Supine to Sit: Moderate assistance  Sit to Supine: Minimum assistance; Moderate assistance  Sit to Stand: Moderate assistance  Stand to Sit: Moderate assistance  Scooting: Moderate assistance; Additional time     Most Recent Physical Functioning:   Gross Assessment:                  Posture:     Balance:  Sitting: Impaired  Sitting - Static: Good (unsupported)  Sitting - Dynamic: Fair (occasional)  Standing: Impaired  Standing - Static: Constant support  Standing - Dynamic : Poor Bed Mobility:  Supine to Sit: Moderate assistance  Sit to Supine: Minimum assistance; Moderate assistance  Scooting: Moderate assistance; Additional time  Wheelchair Mobility:     Transfers:  Sit to Stand:  Moderate assistance  Stand to Sit: Moderate assistance            Patient Vitals for the past 6 hrs:   BP BP Patient Position SpO2 Pulse   02/11/20 1200 131/86 At rest 98 % 60       Mental Status  Neurologic State: Alert  Orientation Level: Oriented X4  Cognition: Appropriate for age attention/concentration  Perception: Cues to maintain midline in sitting, Cues to maintain midline in standing(pt fatigued this afternoon)  Perseveration: No perseveration noted  Safety/Judgement: Awareness of environment, Fall prevention, Decreased insight into deficits                          Physical Skills Involved:  1. Range of Motion  2. Balance  3. Strength  4. Activity Tolerance  5. Fine Motor Control  6. Gross Motor Control Cognitive Skills Affected (resulting in the inability to perform in a timely and safe manner):  1. Perception  2. Expression Psychosocial Skills Affected:  1. Habits/Routines  2. Environmental Adaptation  3. Social Interaction  4. Emotional Regulation  5. Self-Awareness  6. Awareness of Others  7. Social Roles   Number of elements that affect the Plan of Care: 5+:  HIGH COMPLEXITY   CLINICAL DECISION MAKIN[address] [place] [id number] AM-PAC 6 Clicks   Daily Activity Inpatient Short Form  How much help from another person does the patient currently need. .. Total A Lot A Little None   1. Putting on and taking off regular lower body clothing? [x] 1   [] 2   [] 3   [] 4   2. Bathing (including washing, rinsing, drying)? [] 1   [x] 2   [] 3   [] 4   3. Toileting, which includes using toilet, bedpan or urinal?   [] 1   [x] 2   [] 3   [] 4   4. Putting on and taking off regular upper body clothing? [] 1   [x] 2   [] 3   [] 4   5. Taking care of personal grooming such as brushing teeth? [] 1   [x] 2   [] 3   [] 4   6. Eating meals? [] 1   [] 2   [x] 3   [] 4   © , Trustees of [address] [place] [id number], under license to "[employer]. All rights reserved      Score:  Initial: 11 Most Recent: 12 (Date: 2020 )    Interpretation of Tool:  Represents activities that are increasingly more difficult (i.e. Bed mobility, Transfers, Gait).     Medical Necessity: · Patient demonstrates   · good and excellent  ·  rehab potential due to higher previous functional level. Reason for Services/Other Comments:  · Patient continues to require skilled intervention due to   · Decreased independence with ADL/functional transfers that impacts overall quality of life. · .   Use of outcome tool(s) and clinical judgement create a POC that gives a: MODERATE COMPLEXITY         TREATMENT:   (In addition to Assessment/Re-Assessment sessions the following treatments were rendered)     Pre-treatment Symptoms/Complaints:    Pain: Initial:   Pain Intensity 1: 0  Pain Location 1: Shoulder  Pain Orientation 1: Left  Pain Intervention(s) 1: Exercise, Repositioned  Post Session:  0   Therapeutic Activity: (15 minutes): Therapeutic activities including Bed transfers and static/dynamic standing and sitting balance to improve mobility and strength. Required moderate assist to promote static and dynamic balance in standing. Therapeutic Exercise: (15 minutes):  Exercises per grid below to improve mobility and strength. Required maximal manual cues to promote proper body posture and promote proper body mechanics. Progressed with ROM tolerance. Date:  1/27/2020 Date:  2/11/2020 Date:     Activity/Exercise Parameters Parameters Parameters   LUE finger flexion, AROM gravity minimized then AAROM to complete ROM 2x10 reps 2x10 reps    LUE finger extension AAROM 2x10 reps 2x10 reps    LUE wrist extension, AROM gravity minimized then AAROM to complete ROM 1x10 reps 1x10 reps    LUE wrist extension, AROM against gravity  1x10 reps 1x10 reps    LUE wrist flexion AAROM 2x10 reps 1x10 reps    LUE elbow flexion/extension PROM x10 reps 1x10 reps    LUE shoulder flexion PROM x10 reps 1x10 reps         Braces/Orthotics/Lines/Etc:   · LUE sling   · O2 device: Room air  · Treatment/Session Assessment:    · Response to Treatment:  Pt demonstrated good participation.    · Interdisciplinary Collaboration: o Certified Occupational Therapy Assistant  o Rehabilitation Attendant  · After treatment position/precautions:   o Supine in bed  o Bed/Chair-wheels locked  o Bed in low position  o Call light within reach  o RN notified  o Family at bedside   · Compliance with Program/Exercises: Compliant all of the time, Will assess as treatment progresses. · Recommendations/Intent for next treatment session: \"Next visit will focus on advancements to more challenging activities and reduction in assistance provided\".   Total Treatment Duration:  OT Patient Time In/Time Out  Time In: 1530  Time Out: [address] Karine Rodriguez

## 2020-02-11 NOTE — INTERDISCIPLINARY ROUNDS
Interdisciplinary team rounds were held 2/11/2020 with the following team members:  Care Management, Physical Therapy and . Plan of care discussed. See clinical pathway and/or care plan for interventions and desired outcomes.

## 2020-02-11 NOTE — PROGRESS NOTES
Hospitalist Progress Note     Admit Date:  2019  9:07 AM   Name:  Maya Kelley   Age:  53 y.o.  :  1975   MRN:  [id number]   PCP:  Kimberly Fox MD  Treatment Team: Attending Provider: Artie Sow MD; Care Manager: Allyson Mixon, RN; Care Manager: Marcia Sheriff, [facility]; Consulting Provider: Millie Martini MD; Utilization Review: Valentino Woodward RN; Nurse Practitioner: Seferino De La Rosa NP; Primary Nurse: Oj Castellano; Charge Nurse: Kristyn Justice RN; Primary Nurse: Eron Villagomez; Speech Language Pathologist: Mannie Boeck; Physical Therapist: Sulaiman Jennings DPT    Subjective: This is a 54 yo male with PMH of DM II, HTN who presented 19 with c/o left leg and arm weakness, left facial droop and dysarthria.  Code S called. MRI with acute infarctions in left cerebellar hemisphere, deep frontoparietal white matter and right paramedian yariel.  Also noted to have old lacunar infarcts. CTA without large vessel occlusions, however some stenosis noted.  Echo showed PFO, Cardiology to f/u outpatient for evaluation for closure.  Neurology consulted.  Started on ASA, statin.  Pt is uninsured, difficult placement, case management working on plan.       - unchanged. resting comfortably. No complaints. No new issues or symptoms. Objective:     Patient Vitals for the past 24 hrs:   Temp Pulse Resp BP SpO2   20 0800 97.6 °F (36.4 °C) 65 16 133/87 98 %   20 0400 98.1 °F (36.7 °C) 75 16 114/80 95 %   20 0000 98.5 °F (36.9 °C) 71 16 124/85 98 %   02/10/20 2000 98.1 °F (36.7 °C) 67 16 123/83 98 %   02/10/20 1600 97.5 °F (36.4 °C) 74 17 128/86 98 %   02/10/20 1200 98.2 °F (36.8 °C) 66 16 138/87 97 %     Oxygen Therapy  O2 Sat (%): 98 % (20 0800)  Pulse via Oximetry: 66 beats per minute (20 0000)  O2 Device: Room air (20 0000)  No intake or output data in the 24 hours ending 20 0840      General:    Well nourished. Alert. Extremities: Warm and dry. No cyanosis or edema. Skin:     No rashes or jaundice. Data Review:  I have reviewed all labs, meds, telemetry events, and studies from the last 24 hours. Recent Results (from the past 24 hour(s))   GLUCOSE, POC    Collection Time: 02/11/20  7:11 AM   Result Value Ref Range    Glucose (POC) 89 65 - 100 mg/dL        All Micro Results     None          Current Meds:  Current Facility-Administered Medications   Medication Dose Route Frequency    lip protectant (BLISTEX) ointment 1 Each  1 Each Topical PRN    acetaminophen (TYLENOL) tablet 650 mg  650 mg Oral Q6H    metFORMIN (GLUCOPHAGE) tablet 500 mg  500 mg Oral BID WITH MEALS    metoprolol succinate (TOPROL-XL) XL tablet 25 mg  25 mg Oral DAILY    polyethylene glycol (MIRALAX) packet 17 g  17 g Oral DAILY PRN    guaiFENesin (ROBITUSSIN) 100 mg/5 mL oral liquid 100 mg  100 mg Oral Q4H PRN    hydrALAZINE (APRESOLINE) 20 mg/mL injection 20 mg  20 mg IntraVENous Q4H PRN    atorvastatin (LIPITOR) tablet 80 mg  80 mg Oral QHS    lisinopril (PRINIVIL, ZESTRIL) tablet 40 mg  40 mg Oral DAILY    sodium chloride (NS) flush 5-40 mL  5-40 mL IntraVENous PRN    ondansetron (ZOFRAN) injection 4 mg  4 mg IntraVENous Q4H PRN    aspirin chewable tablet 81 mg  81 mg Oral DAILY    enoxaparin (LOVENOX) injection 40 mg  40 mg SubCUTAneous Q24H    dextrose 40% (GLUTOSE) oral gel 1 Tube  15 g Oral PRN    glucagon (GLUCAGEN) injection 1 mg  1 mg IntraMUSCular PRN    dextrose (D50W) injection syrg 12.5-25 g  25-50 mL IntraVENous PRN       Other Studies (last 24 hours):  No results found.     Assessment and Plan:     Hospital Problems as of 2/11/2020 Date Reviewed: 3/3/2017          Codes Class Noted - Resolved POA    PFO (patent foramen ovale) ICD-10-CM: Q21.1  ICD-9-CM: 745.5  12/17/2019 - Present Yes        Arrhythmia ICD-10-CM: I49.9  ICD-9-CM: 427.9  12/15/2019 - Present Yes        Hyperlipemia ICD-10-CM: E78.5  ICD-9-CM: 272.4 12/15/2019 - Present Yes        * (Principal) Acute embolic stroke [facility]) [id number]: I63.9  ICD-9-CM: 434.11  12/12/2019 - Present Yes        Hypertension (Chronic) ICD-10-CM: I10  ICD-9-CM: 401.9  Unknown - Present Yes        Type 2 diabetes mellitus (HCC) (Chronic) ICD-10-CM: E11.9  ICD-9-CM: 250.00  Unknown - Present Yes              PLAN:    Acute embolic stroke  -is \"outpatient ready\" so no routine labs indicated  -MRI with acute infarcts in L cerebellar hemisphere, deep frontoparietal white matter, and R paramedian yariel. old lacunar infarcts. -ISMAEL: 3 mm PFO, Cardiology stated he needs an evaluation for closure PFO with high risk features including significant (3 mm) separation of septum secundum and primum as well as large shunt.  Will await recovery of CVA.  Plan is 4 week event monitor as outpatient.  Will then see in offfice and if no arrythmias will plan PFO closure  -statin, ASA     Hypertension:   -lisinopril, metoprolol     Type 2 diabetes mellitus:    -cont current    DISPO: waiting for pt to be deemed \"disabled\" which in neurologic cases have to allow for 6 months showing chronicity. Then he can get medicaid after that.     Signed:  Claudene Amis, MD

## 2020-02-12 LAB — GLUCOSE BLD STRIP.AUTO-MCNC: 107 MG/DL (ref 65–100)

## 2020-02-12 PROCEDURE — 97116 GAIT TRAINING THERAPY: CPT

## 2020-02-12 PROCEDURE — 65270000029 HC RM PRIVATE

## 2020-02-12 PROCEDURE — 92507 TX SP LANG VOICE COMM INDIV: CPT

## 2020-02-12 PROCEDURE — 97110 THERAPEUTIC EXERCISES: CPT

## 2020-02-12 PROCEDURE — 74011250637 HC RX REV CODE- 250/637: Performed by: HOSPITALIST

## 2020-02-12 PROCEDURE — 97164 PT RE-EVAL EST PLAN CARE: CPT

## 2020-02-12 PROCEDURE — 74011250636 HC RX REV CODE- 250/636: Performed by: INTERNAL MEDICINE

## 2020-02-12 PROCEDURE — 82962 GLUCOSE BLOOD TEST: CPT

## 2020-02-12 PROCEDURE — 74011250637 HC RX REV CODE- 250/637: Performed by: INTERNAL MEDICINE

## 2020-02-12 RX ADMIN — METOPROLOL SUCCINATE 25 MG: 25 TABLET, FILM COATED, EXTENDED RELEASE ORAL at 08:50

## 2020-02-12 RX ADMIN — ACETAMINOPHEN 650 MG: 325 TABLET, FILM COATED ORAL at 23:22

## 2020-02-12 RX ADMIN — LISINOPRIL 40 MG: 20 TABLET ORAL at 08:50

## 2020-02-12 RX ADMIN — ENOXAPARIN SODIUM 40 MG: 40 INJECTION SUBCUTANEOUS at 15:17

## 2020-02-12 RX ADMIN — ASPIRIN 81 MG 81 MG: 81 TABLET ORAL at 08:50

## 2020-02-12 RX ADMIN — ATORVASTATIN CALCIUM 80 MG: 80 TABLET, FILM COATED ORAL at 22:02

## 2020-02-12 RX ADMIN — ACETAMINOPHEN 650 MG: 325 TABLET, FILM COATED ORAL at 05:39

## 2020-02-12 RX ADMIN — METFORMIN HYDROCHLORIDE 500 MG: 500 TABLET, FILM COATED ORAL at 08:50

## 2020-02-12 RX ADMIN — ACETAMINOPHEN 650 MG: 325 TABLET, FILM COATED ORAL at 15:17

## 2020-02-12 RX ADMIN — ACETAMINOPHEN 650 MG: 325 TABLET, FILM COATED ORAL at 17:10

## 2020-02-12 RX ADMIN — METFORMIN HYDROCHLORIDE 500 MG: 500 TABLET, FILM COATED ORAL at 17:10

## 2020-02-12 NOTE — PROGRESS NOTES
Neurolinguistic Goals:  LTG: Patient will increase neuro-linguistic abilities to increase safety and awareness in functional living environment   STG: Patient will solve mathematical problems with 80%accuracy given minimal cueing   STG: Patient will name 10 items to concrete category in 1 minute given minimal cueing. Progressing 1/31/2020  STG: Patient will participate in verbal reasoning tasks with 80% accuracy given minimal cueing  STG: Patient will complete mental manipulation tasks with 80% accuracy given minimal cueing  STG: Patient will complete working memory/attention tasks with 80% accuracy given minimal cueing  STG: Patient will recall relevant verbal information with 80% accuracy with minimal assistance  STG: Patient will utilize memory compensatory strategies to improve recall of functional list/message with 90%accuracy given minimal assistance  STG: Patient will participate in ongoing cognitive linguistic evaluation for therapeutic assessment. MET 1/31/2020     Dysarthria Goals:  LTG: Patient will develop functional and intelligible speech and utilize compensatory strategies to improve communication across environments  STG: Patient will utilize compensatory strategies at conversation level with 90% accuracy independently. Added 1/31/2020    SPEECH LANGUAGE PATHOLOGY: SPEECH-LANGUAGE/COGNITION: Daily Note 4    NAME/AGE/GENDER: Austin Lo is a 53 y.o. male  DATE: 2/12/2020  PRIMARY DIAGNOSIS: Acute ischemic stroke ([facility] [I63.9]  Cerebrovascular accident (CVA) due to embolism ([facility] [I63.9]      ICD-10: Treatment Diagnosis: R47.1 Dysarthria and Anarthria  R41.841 Cognitive-Communication Deficit    INTERDISCIPLINARY COLLABORATION: n/a  PRECAUTIONS/ALLERGIES: Patient has no known allergies. SUBJECTIVE   Pt cooperative. Sitting upright in bed eating lunch. Problem List:  (Impairments causing functional limitations):  1. Dysarthria  2.  Cognitive linguistic impaired        Pain: Pain Scale 1: Numeric (0 - 10)  Pain Intensity 1: 0     OBJECTIVE   The following treatment tasks were completed to address memory and dysarthria:     Cognition:   Daily math problems involving time: 30%. Recalled details of the problems with 70% accuracy. Dysarthria:   Mod-max cues to over articulate in spontaneous speech. Mod-max cues to state compensatory strategies for increased speech intelligibility. ASSESSMENT   Pt with significant difficulties with daily math problems. Unsure of baseline math skills though some questions he missed were basic. SLP repeated details when needed. Mod dysarthria persists evidenced by mod hypernasality, mild breathiness and blended word boundaries. When pt was asked what strategies he's supposed to use for increased speech intelligibility, he listed memory strategies. Pt finally stated he was supposed to talk loud and clear when asked a third time about speech strategies vs memory strategies. Mod-max cues for him to over articulate in spontaneous speech. Patient currently functioning below baseline. Will continue to follow for cognitive linguistic treatment to improve functional independence/communication. Tool Used: MODIFIED MARTY SCALE (mRS)   Score   No Symptoms  [] 0   No significant disability despite symptoms; able to carry out all usual duties and activities  [] 1   Slight disability; unable to carry out all previous activities but able to look after own affairs without assistance.    [x] 2   Moderate disability; requiring some help but able to walk without assistance  [] 3   Moderately severe disability; unable to walk without assistance and unable to attend to own bodily needs without assistance  [] 4   Severe disability; bedridden, incontinent, and requiring constant nursing care and attention  [] 5      Score:  Initial: 2 Most Recent:  (Date 02/11/20 )   Interpretation of Tool: The Modified Marty Scale is a 7-point scaled used to quantify level of disability as it relates to a patient's functional abilities. PLAN    FREQUENCY/DURATION: Continue to follow patient 2 times a week for duration of hospital stay to address above goals. - Recommendations for next treatment session: Next treatment will address cognition   REHABILITATION POTENTIAL FOR STATED GOALS: Good           RECOMMENDATIONS   STRATEGIES:   · Memory strategies  · Dysarthria strategies     EDUCATION:  · Recommendations discussed with Patient and wife     RECOMMENDATIONS for CONTINUED SPEECH THERAPY: YES: Anticipate need for ongoing speech therapy during this hospitalization and at next level of care. Compliance with Program/Exercises: Will assess as treatment progresses  Continuation of Skilled Services/Medical Necessity:   Patient is expected to demonstrate progress in cognitive ability to decrease assistance required communication, increase independence with activities of daily living and increase communication with family/caregivers.  Patient continues to require skilled intervention due to impaired cognitive linguistic abilities.      SAFETY:  After treatment position/precautions:  · Upright in bed  · Call light within reach    Total Treatment Duration:   Time In: 1145  Time Out: [facility] [place] [facility], [facility] IHSANO PANCHO ARRIAZA, CCC-SLP

## 2020-02-12 NOTE — PROGRESS NOTES
02/12/20 0707   NIH Stroke Scale   Interval Other (comment)  (dual NIH with Jenny Freeman RN)   LOC 0   LOC Questions 0   LOC Commands 0   Best Gaze 0   Visual 0   Facial Palsy 1   Motor Right Arm 0   Motor Left Arm 2   Motor Right Leg 0   Motor Left Leg 2   Limb Ataxia 0   Sensory 0   Best Language 1   Dysarthria 1   Extinction and Inattention 0   Total 7

## 2020-02-12 NOTE — PROGRESS NOTES
Problem: Mobility Impaired (Adult and Pediatric)  Goal: *Acute Goals and Plan of Care  Description  Acute PT Goals (reviewed & updated 2/12/2020)  *Goal Added 2/12/2020  **Progressing on re-assessment 2/12/2020  STG:  (1.) Mr. Sneha Munguia will demonstrate good dynamic sitting balance within 3 treatment days. **  (2) Mr. Sneha Munguia will transfer sit to stand with [address] within 3 treatment days. **  (3.)Mr. Dom Espinoza will transfer from bed to chair and chair to bed via [address] [facility] with MODERATE ASSIST x1 using the least restrictive device within 3 treatment day(s). *  (4.) Mr. Sneha Munguia will ambulate with MODERATE ASSIST for 25+ feet with the least restrictive device within 3 treatment day(s). **  (5.)Mr. Sneha Munguia will tolerate sitting up in chair/wheelchair x4 hours with 0/10 pain within 3 treatment days. **    LTG:  (1.) Mr. Sneha Munguia will perform bed mobility with CONTACT GUARD ASSISTANCE within 7 treatment day(s). *  (2.) Mr. Sneha Munguia will transfer from bed to chair and chair to bed via [address] [facility] with MINIMAL ASSIST using the least restrictive device within 7 treatment day(s). **  (3.) Mr. Sneha Munguia will ambulate with MINIMAL ASSIST for 50+ feet with the least restrictive device within 7 treatment day(s). **  (4.)Mr. Sneha Munguia will demonstrate FAIR STATIC STANDING balance within 7 treatment days.  *    MET GOALS:      PHYSICAL THERAPY: Daily Note, Re-evaluation and PM 2/12/2020  INPATIENT: PT Visit Days : 1  Payor: MEDICAID PENDING / Plan: [place] PENDING / Product Type: Medicaid /       NAME/AGE/GENDER: Fernanda Vieyra is a 53 y.o. male   PRIMARY DIAGNOSIS: Acute ischemic stroke ([address] [I63.9]  Cerebrovascular accident (CVA) due to embolism ([address] [P88.9] Acute embolic stroke ([address] Acute embolic stroke ([address]       ICD-10: Treatment Diagnosis:   · Difficulty in walking, Not elsewhere classified (R26.2)  · Hemiplegia and hemiparesis following cerebral infarction affecting left non-dominant side (H19.546)   Precaution/Allergies:  Patient has no known allergies. ASSESSMENT:     Mr. Karlos Alvarenga is a 53year old admitted R MCA CVA with left hemiparesis. Patient seen at bedside for physical therapy re-assessment to address goals and plan of care. At baseline, Mr. Karlos Alvarenga is an independent, community-level ambulator. Today, L UE remains with 0/5 motor with trace elevation in upper trap, L LE with 2+/5 abduction, 2/5 adduction, and 2/5 hip flexion and sensation remains intact to light touch LUE/LE. Patient required improved minimal assistance for supine to sitting transfer to right side, moderate assistance for scooting, and demonstrated good static and improved fair+ dynamic sitting balance. Addressed sit to stand transfer training with moderate assistance and blocking L LE as well as SPT to wheelchair with moderate assistance x1 and minimal assistance x1. Addressed gait training in hallway utilizing railing for R UE support, gait belt, wheelchair follow, and R LE manual facilitation. CGA required for trunk control throughout ambulation with minimal assistance with balance (improved), and moderate assistance with advancement and placement of L LE. Biggest limitation is quad and glute activation through stance phase; minimal quad activation; glute responded fair to facilitation. Performed dual tasking tasks in wheelchair around unit with orientation to left side and naming, reading, and safety awareness with navigation. Moderate assistance transfer WC to bed and addressed hip ab/adducation and bridging. Good participation today. Patient has met 2 goals and is progressing in 4 additional goals. Additional transfer and gait goals added to plan of care. Recommend inpatient rehabilitation at discharge; disposition challenges. Will continued to follow with updated plan of care during acute stay.     This section established at most recent assessment   PROBLEM LIST (Impairments causing functional limitations):  1. Decreased Strength  2. Decreased ADL/Functional Activities  3. Decreased Transfer Abilities  4. Decreased Ambulation Ability/Technique  5. Decreased Balance  6. Increased Pain  7. Decreased Activity Tolerance  8. Increased Fatigue  9. Decreased Flexibility/Joint Mobility   INTERVENTIONS PLANNED: (Benefits and precautions of physical therapy have been discussed with the patient.)  1. Balance Exercise  2. Bed Mobility  3. Family Education  4. Gait Training  5. Home Exercise Program (HEP)  6. Manual Therapy  7. Neuromuscular Re-education/Strengthening  8. Range of Motion (ROM)  9. Therapeutic Activites  10. Therapeutic Exercise/Strengthening  11. Transfer Training     TREATMENT PLAN: Frequency/Duration: 3 times a week for duration of hospital stay  Rehabilitation Potential For Stated Goals: Good     REHAB RECOMMENDATIONS (at time of discharge pending progress):    Placement: It is my opinion, based on this patient's performance to date, that Mr. Anoop Myers may benefit from intensive therapy at an [address] [place] [address] after discharge due to a probable need for close medical supervision by a rehab physician, a probable need for multiple therapy disciplines and potential to make ongoing and sustainable functional improvement that is of practical value. .  Equipment:    TBD pending progress with therapy. HISTORY:   History of Present Injury/Illness (Reason for Referral):  Per H&P: \"Pt is a 52 y/o smoker with DM, HTN, who presented to ER with L leg and arm weakness, L facial droop, dysarthria. First noted L leg weakness late night 12/11 when he woke up to go to the bathroom. He was normal when he went to bed around 1030. Woke up this morning and had persistent weakness L leg and also now noted in L arm. EMS called. Noted to have slurred speech and L facial droop as well.   Code S called in ER around 9am.  MRI with acute infarcts in L cerebellar hemisphere, deep frontoparietal white matter, and R paramedian yariel. Also noted old lacunar infarcts. No large vessel occlusion on CTA, but some stenosis noted. No hx afib, TIA, CVA. No CP, palpitations, SOB. \"  Past Medical History/Comorbidities:   Mr. Davey Stein  has a past medical history of Acute ischemic stroke ([address] (12/12/2019), Acute pancreatitis (11/19/2014), Cerebrovascular accident (CVA) due to embolism ([address] (12/12/2019), Diabetes ([address] (2002), Diabetes ([address] Diabetes mellitus, and Hypertension. He also has no past medical history of Arthritis, Asthma, Autoimmune disease ([address] CAD (coronary artery disease), Cancer ([address] Chronic kidney disease, COPD, Dementia, Dementia ([address] Heart failure ([address] Ill-defined condition, Infectious disease, Liver disease, Other ill-defined conditions(799.89), Psychiatric disorder, PUD (peptic ulcer disease), Seizures ([address] or Sleep disorder. Mr. Davey Stein  has a past surgical history that includes hx hernia repair and hx orthopaedic. Social History/Living Environment:   Home Environment: Apartment  # Steps to Enter: 12  Rails to Enter: Yes  [employer] : Bilateral  One/Two Story Residence: One story  Living Alone: No  Support Systems: Spouse/Significant Other/Partner  Patient Expects to be Discharged to[de-identified] Unknown  Current DME Used/Available at Home: None  Tub or Shower Type: Tub/Shower combination  Prior Level of Function/Work/Activity:  Independent, lives with wife in 2nd story 1 level apartment. No recent falls.    Number of Personal Factors/Comorbidities that affect the Plan of Care: 1-2: MODERATE COMPLEXITY   EXAMINATION:   Most Recent Physical Functioning:   Gross Assessment:  AROM: Grossly decreased, non-functional(L UE L LE)  PROM: Within functional limits  Strength: Grossly decreased, non-functional(L UE and L LE)  Coordination: Generally decreased, functional  Tone: Abnormal(Flaccid L UE)  Sensation: Intact(Light touch B LE)               Posture:     Balance:  Sitting: Impaired  Sitting - Static: Good (unsupported)  Sitting - Dynamic: Fair (occasional)(+)  Standing: Impaired  Standing - Static: Poor  Standing - Dynamic : Poor Bed Mobility:  Supine to Sit: Minimum assistance; Additional time  Sit to Supine: Moderate assistance  Scooting: Moderate assistance; Additional time  Wheelchair Mobility:     Transfers:  Sit to Stand: Moderate assistance  Stand to Sit: Moderate assistance  Stand Pivot Transfers: Moderate assistance  Bed to Chair: Moderate assistance  Interventions: Tactile cues; Verbal cues; Visual cues; Weight shifting training/Pressure relief; Safety awareness training;Manual cues  Gait:     Base of Support: Narrowed; Center of gravity altered  Gait Abnormalities: Hemiplegic  Distance (ft): 10 Feet (ft)  Assistive Device: Gait belt(Railing on the wall)  Ambulation - Level of Assistance: Moderate assistance  Rail Use: Right   Interventions: Safety awareness training; Tactile cues; Verbal cues      Body Structures Involved:  1. Nerves  2. Voice/Speech  3. Bones  4. Joints  5. Muscles Body Functions Affected:  1. Mental  2. Sensory/Pain  3. Neuromusculoskeletal  4. Movement Related Activities and Participation Affected:  1. General Tasks and Demands  2. Mobility  3. Self Care  4. Interpersonal Interactions and Relationships   Number of elements that affect the Plan of Care: 4+: HIGH COMPLEXITY   CLINICAL PRESENTATION:   Presentation: Evolving clinical presentation with changing clinical characteristics: MODERATE COMPLEXITY   CLINICAL DECISION MAKIN [facility] Mobility Inpatient Short Form  How much difficulty does the patient currently have. .. Unable A Lot A Little None   1. Turning over in bed (including adjusting bedclothes, sheets and blankets)? [] 1   [] 2   [x] 3   [] 4   2. Sitting down on and standing up from a chair with arms ( e.g., wheelchair, bedside commode, etc.)   [] 1   [x] 2   [] 3   [] 4   3.   Moving from lying on back to sitting on the side of the bed? [] 1   [] 2   [x] 3   [] 4   How much help from another person does the patient currently need. .. Total A Lot A Little None   4. Moving to and from a bed to a chair (including a wheelchair)? [] 1   [x] 2   [] 3   [] 4   5. Need to walk in hospital room? [] 1   [x] 2   [] 3   [] 4   6. Climbing 3-5 steps with a railing? [] 1   [x] 2   [] 3   [] 4   © 2007, Trustees of [address] [place] [address] [address] [id number], under license to [employer]. All rights reserved      Score:  Initial: 13 Most Recent: 14 (Date:2/12/2020)    Interpretation of Tool:  Represents activities that are increasingly more difficult (i.e. Bed mobility, Transfers, Gait). Medical Necessity:     · Patient is expected to demonstrate progress in   · strength, range of motion, balance, coordination, and functional technique  ·  to   · increase independence with all mobility. · .  Reason for Services/Other Comments:  · Patient continues to require skilled intervention due to   · medical complications and mobility deficits which impact his level of function, safety, and independence as indicated above. · .   Use of outcome tool(s) and clinical judgement create a POC that gives a: Questionable prediction of patient's progress: MODERATE COMPLEXITY        TREATMENT:      Pre-treatment Symptoms/Complaints:  none  Pain: Initial: None Post Session:  None     Physical Therapy Re-Evaluation (untimed charge)    Gait Training (  10 minutes):  Gait training to improve and/or restore physical functioning as related to mobility, strength and balance. Ambulated 10 Feet (ft) with Moderate assistance using a Gait belt(Railing on the wall) and moderate Safety awareness training; Tactile cues; Verbal cues related to their pre-gait weight-shifts, manual cues/faciliation throughout swing and stance phase on L LE, and L LE advancement.      Neuromuscular Re-education: ( 45 Minutes):  Exercise/activities including sitting/standing balance, postural re-training, joint compressing L LE, positioning L UE, bed mobility and transfer training with facilitation, dual tasking, orienting to left visual/spatial field, ab/adducation exercises with quick stretch and with facilitation, weight bearing through L LE and glute facilitation with bridging to improve balance, kinesthetic sense, posture and proprioception. Required moderate visual, verbal, manual and tactile cues to promote static and dynamic balance in standing and promote motor control of bilateral, lower extremity(s). Braces/Orthotics/Lines/Etc:   · Sling to L UE throughout transfers and ambulation  Treatment/Session Assessment:    · Response to Treatment:  See above  · Interdisciplinary Collaboration:   o Physical Therapist  o Physical Therapy Assistant  o Registered Nurse  o Rehabilitation Attendant  o Certified Nursing Assistant/Patient Care Technician  · After treatment position/precautions:   o Supine in bed  o Bed alarm/tab alert on  o Bed/Chair-wheels locked  o Bed in low position  o Call light within reach  o RN notified  o Side rails x 2  o PCT at bedside; patient needs met   · Compliance with Program/Exercises: Compliant all of the time  · Recommendations/Intent for next treatment session: \"Next visit will focus on advancements to more challenging activities and reduction in assistance provided\".     Total Treatment Duration:  PT Patient Time In/Time Out  Time In: 9339  Time Out: [address] Yazmin [place], Sw, DPT

## 2020-02-13 LAB — GLUCOSE BLD STRIP.AUTO-MCNC: 91 MG/DL (ref 65–100)

## 2020-02-13 PROCEDURE — 82962 GLUCOSE BLOOD TEST: CPT

## 2020-02-13 PROCEDURE — 74011250636 HC RX REV CODE- 250/636: Performed by: INTERNAL MEDICINE

## 2020-02-13 PROCEDURE — 74011250637 HC RX REV CODE- 250/637: Performed by: HOSPITALIST

## 2020-02-13 PROCEDURE — 65270000029 HC RM PRIVATE

## 2020-02-13 PROCEDURE — 74011250637 HC RX REV CODE- 250/637: Performed by: INTERNAL MEDICINE

## 2020-02-13 RX ADMIN — ACETAMINOPHEN 650 MG: 325 TABLET, FILM COATED ORAL at 23:51

## 2020-02-13 RX ADMIN — ATORVASTATIN CALCIUM 80 MG: 80 TABLET, FILM COATED ORAL at 21:51

## 2020-02-13 RX ADMIN — GUAIFENESIN 100 MG: 100 SOLUTION ORAL at 17:15

## 2020-02-13 RX ADMIN — ACETAMINOPHEN 650 MG: 325 TABLET, FILM COATED ORAL at 11:11

## 2020-02-13 RX ADMIN — METOPROLOL SUCCINATE 25 MG: 25 TABLET, FILM COATED, EXTENDED RELEASE ORAL at 08:14

## 2020-02-13 RX ADMIN — LISINOPRIL 40 MG: 20 TABLET ORAL at 08:14

## 2020-02-13 RX ADMIN — ACETAMINOPHEN 650 MG: 325 TABLET, FILM COATED ORAL at 05:42

## 2020-02-13 RX ADMIN — ENOXAPARIN SODIUM 40 MG: 40 INJECTION SUBCUTANEOUS at 15:05

## 2020-02-13 RX ADMIN — METFORMIN HYDROCHLORIDE 500 MG: 500 TABLET, FILM COATED ORAL at 17:14

## 2020-02-13 RX ADMIN — METFORMIN HYDROCHLORIDE 500 MG: 500 TABLET, FILM COATED ORAL at 08:14

## 2020-02-13 RX ADMIN — ACETAMINOPHEN 650 MG: 325 TABLET, FILM COATED ORAL at 17:14

## 2020-02-13 RX ADMIN — ASPIRIN 81 MG 81 MG: 81 TABLET ORAL at 08:14

## 2020-02-13 NOTE — PROGRESS NOTES
Hospitalist Progress Note     Admit Date:  2019  9:07 AM   Name:  Fernanda Vieyra   Age:  53 y.o.  :  1975   MRN:  [id number]   PCP:  Tiana Camara MD  Treatment Team: Attending Provider: Laura Jose MD; Care Manager: Javid Uribe, RN; Care Manager: Chaka Laurent [facility]; Utilization Review: Ashlee Serrano RN; Nurse Practitioner: Kizzy Morillo NP; Charge Nurse: Kevin Jo    Subjective: This is a 54 yo male with PMH of DM II, HTN who presented 19 with c/o left leg and arm weakness, left facial droop and dysarthria.  Code S called. MRI with acute infarctions in left cerebellar hemisphere, deep frontoparietal white matter and right paramedian yariel.  Also noted to have old lacunar infarcts. CTA without large vessel occlusions, however some stenosis noted.  Echo showed PFO, Cardiology to f/u outpatient for evaluation for closure.  Neurology consulted.  Started on ASA, statin.  Pt is uninsured, difficult placement, case management working on plan.       - pt reports no changes. Thinks his walking may be getting better. No other complaints    Objective:     Patient Vitals for the past 24 hrs:   Temp Pulse Resp BP SpO2   20 1200 97.7 °F (36.5 °C) 68 18 (!) 155/98 99 %   20 0803 97.5 °F (36.4 °C) 66 19 137/88 97 %   20 0800 97.5 °F (36.4 °C) 66 19 137/88 97 %   20 0400 97.7 °F (36.5 °C) 73 16 116/80 99 %   20 0000 97.7 °F (36.5 °C) (!) 55 16 148/81 99 %   20 2000 98.1 °F (36.7 °C) 63 16 132/87 97 %   20 1600 98.4 °F (36.9 °C) 68 17 150/88 98 %     Oxygen Therapy  O2 Sat (%): 99 % (20 1200)  Pulse via Oximetry: 66 beats per minute (20 0000)  O2 Device: Room air (02/01/20 0000)    Intake/Output Summary (Last 24 hours) at 2020 1436  Last data filed at 2020 0541  Gross per 24 hour   Intake 150 ml   Output    Net 150 ml         General:    Well nourished. Alert. Extremities: Warm and dry.   No cyanosis or edema. Skin:     No rashes or jaundice. Data Review:  I have reviewed all labs, meds, telemetry events, and studies from the last 24 hours. Recent Results (from the past 24 hour(s))   GLUCOSE, POC    Collection Time: 02/13/20  7:08 AM   Result Value Ref Range    Glucose (POC) 91 65 - 100 mg/dL        All Micro Results     None          Current Meds:  Current Facility-Administered Medications   Medication Dose Route Frequency    lip protectant (BLISTEX) ointment 1 Each  1 Each Topical PRN    acetaminophen (TYLENOL) tablet 650 mg  650 mg Oral Q6H    metFORMIN (GLUCOPHAGE) tablet 500 mg  500 mg Oral BID WITH MEALS    metoprolol succinate (TOPROL-XL) XL tablet 25 mg  25 mg Oral DAILY    polyethylene glycol (MIRALAX) packet 17 g  17 g Oral DAILY PRN    guaiFENesin (ROBITUSSIN) 100 mg/5 mL oral liquid 100 mg  100 mg Oral Q4H PRN    hydrALAZINE (APRESOLINE) 20 mg/mL injection 20 mg  20 mg IntraVENous Q4H PRN    atorvastatin (LIPITOR) tablet 80 mg  80 mg Oral QHS    lisinopril (PRINIVIL, ZESTRIL) tablet 40 mg  40 mg Oral DAILY    sodium chloride (NS) flush 5-40 mL  5-40 mL IntraVENous PRN    ondansetron (ZOFRAN) injection 4 mg  4 mg IntraVENous Q4H PRN    aspirin chewable tablet 81 mg  81 mg Oral DAILY    enoxaparin (LOVENOX) injection 40 mg  40 mg SubCUTAneous Q24H    dextrose 40% (GLUTOSE) oral gel 1 Tube  15 g Oral PRN    glucagon (GLUCAGEN) injection 1 mg  1 mg IntraMUSCular PRN    dextrose (D50W) injection syrg 12.5-25 g  25-50 mL IntraVENous PRN       Other Studies (last 24 hours):  No results found.     Assessment and Plan:     Hospital Problems as of 2/13/2020 Date Reviewed: 3/3/2017          Codes Class Noted - Resolved POA    PFO (patent foramen ovale) ICD-10-CM: Q21.1  ICD-9-CM: 745.5  12/17/2019 - Present Yes        Arrhythmia ICD-10-CM: I49.9  ICD-9-CM: 427.9  12/15/2019 - Present Yes        Hyperlipemia ICD-10-CM: E78.5  ICD-9-CM: 272.4  12/15/2019 - Present Yes        * (Principal) Acute embolic stroke [facility]) [id number]: I63.9  ICD-9-CM: 434.11  12/12/2019 - Present Yes        Hypertension (Chronic) ICD-10-CM: I10  ICD-9-CM: 401.9  Unknown - Present Yes        Type 2 diabetes mellitus (HCC) (Chronic) ICD-10-CM: E11.9  ICD-9-CM: 250.00  Unknown - Present Yes              PLAN:    Acute embolic stroke  -is \"outpatient ready\" so no routine labs indicated  -MRI with acute infarcts in L cerebellar hemisphere, deep frontoparietal white matter, and R paramedian yariel. old lacunar infarcts. -ISMAEL: 3 mm PFO, Cardiology stated he needs an evaluation for closure PFO with high risk features including significant (3 mm) separation of septum secundum and primum as well as large shunt.  Will await recovery of CVA.  Plan is 4 week event monitor as outpatient.  Will then see in offfice and if no arrythmias will plan PFO closure  -statin, ASA     Hypertension:   -lisinopril, metoprolol     Type 2 diabetes mellitus:    -cont current    DISPO: waiting for pt to be deemed \"disabled\" which in neurologic cases have to allow for 6 months showing chronicity. Then he can get medicaid after that.     Signed:  Martha Devi MD

## 2020-02-13 NOTE — PROGRESS NOTES
02/13/20 0700   NIH Stroke Scale   Interval Other (comment)  (dual NIH with Chrissie Silva RN)   LOC 0   LOC Questions 0   LOC Commands 0   Best Gaze 0   Visual 0   Facial Palsy 1   Motor Right Arm 0   Motor Left Arm 2   Motor Right Leg 0   Motor Left Leg 2   Limb Ataxia 0   Sensory 0   Best Language 1   Dysarthria 1   Extinction and Inattention 0   Total 7

## 2020-02-13 NOTE — PROGRESS NOTES
02/12/20 1900   NIH Stroke Scale   Interval Other (comment)   LOC 0   LOC Questions 0   LOC Commands 0   Best Gaze 0   Visual 0   Facial Palsy 1   Motor Right Arm 0   Motor Left Arm 2   Motor Right Leg 0   Motor Left Leg 2   Limb Ataxia 0   Sensory 0   Best Language 1   Dysarthria 1   Extinction and Inattention 0   Total 7   Dual NIH with Francisca EDWARDS

## 2020-02-13 NOTE — PROGRESS NOTES
Hospitalist Progress Note     Admit Date:  2019  9:07 AM   Name:  Ebenezer Lima   Age:  53 y.o.  :  1975   MRN:  [id number]   PCP:  Jayesh Herr MD  Treatment Team: Attending Provider: Yue Suggs MD; Care Manager: Maya Paniagua, RN; Care Manager: Rose Anna LMSW; Utilization Review: Soumya Guzmán RN; Nurse Practitioner: Amanda Bledsoe NP; Charge Nurse: Dylon Amaral    Subjective: This is a 52 yo male with PMH of DM II, HTN who presented 19 with c/o left leg and arm weakness, left facial droop and dysarthria.  Code S called. MRI with acute infarctions in left cerebellar hemisphere, deep frontoparietal white matter and right paramedian yariel.  Also noted to have old lacunar infarcts. CTA without large vessel occlusions, however some stenosis noted.  Echo showed PFO, Cardiology to f/u outpatient for evaluation for closure.  Neurology consulted.  Started on ASA, statin.  Pt is uninsured, difficult placement, case management working on plan.       - feeling OK. No changes or complaints. Working with PT    Objective:     Patient Vitals for the past 24 hrs:   Temp Pulse Resp BP SpO2   20 1200 97.7 °F (36.5 °C) 68 18 (!) 155/98 99 %   20 0803 97.5 °F (36.4 °C) 66 19 137/88 97 %   20 0800 97.5 °F (36.4 °C) 66 19 137/88 97 %   20 0400 97.7 °F (36.5 °C) 73 16 116/80 99 %   20 0000 97.7 °F (36.5 °C) (!) 55 16 148/81 99 %   20 2000 98.1 °F (36.7 °C) 63 16 132/87 97 %   20 1600 98.4 °F (36.9 °C) 68 17 150/88 98 %     Oxygen Therapy  O2 Sat (%): 99 % (20 1200)  Pulse via Oximetry: 66 beats per minute (20 0000)  O2 Device: Room air (20 0000)    Intake/Output Summary (Last 24 hours) at 2020 1434  Last data filed at 2020 0541  Gross per 24 hour   Intake 150 ml   Output    Net 150 ml         General:    Well nourished. Alert. Extremities: Warm and dry. No cyanosis or edema.    Skin:     No rashes or jaundice. Data Review:  I have reviewed all labs, meds, telemetry events, and studies from the last 24 hours. Recent Results (from the past 24 hour(s))   GLUCOSE, POC    Collection Time: 02/13/20  7:08 AM   Result Value Ref Range    Glucose (POC) 91 65 - 100 mg/dL        All Micro Results     None          Current Meds:  Current Facility-Administered Medications   Medication Dose Route Frequency    lip protectant (BLISTEX) ointment 1 Each  1 Each Topical PRN    acetaminophen (TYLENOL) tablet 650 mg  650 mg Oral Q6H    metFORMIN (GLUCOPHAGE) tablet 500 mg  500 mg Oral BID WITH MEALS    metoprolol succinate (TOPROL-XL) XL tablet 25 mg  25 mg Oral DAILY    polyethylene glycol (MIRALAX) packet 17 g  17 g Oral DAILY PRN    guaiFENesin (ROBITUSSIN) 100 mg/5 mL oral liquid 100 mg  100 mg Oral Q4H PRN    hydrALAZINE (APRESOLINE) 20 mg/mL injection 20 mg  20 mg IntraVENous Q4H PRN    atorvastatin (LIPITOR) tablet 80 mg  80 mg Oral QHS    lisinopril (PRINIVIL, ZESTRIL) tablet 40 mg  40 mg Oral DAILY    sodium chloride (NS) flush 5-40 mL  5-40 mL IntraVENous PRN    ondansetron (ZOFRAN) injection 4 mg  4 mg IntraVENous Q4H PRN    aspirin chewable tablet 81 mg  81 mg Oral DAILY    enoxaparin (LOVENOX) injection 40 mg  40 mg SubCUTAneous Q24H    dextrose 40% (GLUTOSE) oral gel 1 Tube  15 g Oral PRN    glucagon (GLUCAGEN) injection 1 mg  1 mg IntraMUSCular PRN    dextrose (D50W) injection syrg 12.5-25 g  25-50 mL IntraVENous PRN       Other Studies (last 24 hours):  No results found.     Assessment and Plan:     Hospital Problems as of 2/13/2020 Date Reviewed: 3/3/2017          Codes Class Noted - Resolved POA    PFO (patent foramen ovale) ICD-10-CM: Q21.1  ICD-9-CM: 745.5  12/17/2019 - Present Yes        Arrhythmia ICD-10-CM: I49.9  ICD-9-CM: 427.9  12/15/2019 - Present Yes        Hyperlipemia ICD-10-CM: E78.5  ICD-9-CM: 272.4  12/15/2019 - Present Yes        * (Principal) Acute embolic stroke [facility]) ICD-10-CM: I63.9  ICD-9-CM: 434.11  12/12/2019 - Present Yes        Hypertension (Chronic) ICD-10-CM: I10  ICD-9-CM: 401.9  Unknown - Present Yes        Type 2 diabetes mellitus (HCC) (Chronic) ICD-10-CM: E11.9  ICD-9-CM: 250.00  Unknown - Present Yes              PLAN:    Acute embolic stroke  -is \"outpatient ready\" so no routine labs indicated  -MRI with acute infarcts in L cerebellar hemisphere, deep frontoparietal white matter, and R paramedian yariel. old lacunar infarcts. -ISMAEL: 3 mm PFO, Cardiology stated he needs an evaluation for closure PFO with high risk features including significant (3 mm) separation of septum secundum and primum as well as large shunt.  Will await recovery of CVA.  Plan is 4 week event monitor as outpatient.  Will then see in offfice and if no arrythmias will plan PFO closure  -statin, ASA     Hypertension:   -lisinopril, metoprolol     Type 2 diabetes mellitus:    -cont current    DISPO: waiting for pt to be deemed \"disabled\" which in neurologic cases have to allow for 6 months showing chronicity. Then he can get medicaid after that.     Signed:  Jenny Muñoz MD

## 2020-02-13 NOTE — PROGRESS NOTES
Patient is in bed resting in supine position watching TV. No complaints of pain or discomfort. Call light in reach and bed lock and low. Will continue to monitor patient. Princess Weeks. GTC. SN

## 2020-02-14 LAB — GLUCOSE BLD STRIP.AUTO-MCNC: 145 MG/DL (ref 65–100)

## 2020-02-14 PROCEDURE — 74011250636 HC RX REV CODE- 250/636: Performed by: INTERNAL MEDICINE

## 2020-02-14 PROCEDURE — 97530 THERAPEUTIC ACTIVITIES: CPT

## 2020-02-14 PROCEDURE — 65270000029 HC RM PRIVATE

## 2020-02-14 PROCEDURE — 74011250637 HC RX REV CODE- 250/637: Performed by: INTERNAL MEDICINE

## 2020-02-14 PROCEDURE — 74011250637 HC RX REV CODE- 250/637: Performed by: HOSPITALIST

## 2020-02-14 PROCEDURE — 97112 NEUROMUSCULAR REEDUCATION: CPT

## 2020-02-14 PROCEDURE — 82962 GLUCOSE BLOOD TEST: CPT

## 2020-02-14 PROCEDURE — 97116 GAIT TRAINING THERAPY: CPT

## 2020-02-14 RX ADMIN — Medication 10 ML: at 22:30

## 2020-02-14 RX ADMIN — ASPIRIN 81 MG 81 MG: 81 TABLET ORAL at 09:21

## 2020-02-14 RX ADMIN — METFORMIN HYDROCHLORIDE 500 MG: 500 TABLET, FILM COATED ORAL at 17:18

## 2020-02-14 RX ADMIN — LISINOPRIL 40 MG: 20 TABLET ORAL at 09:22

## 2020-02-14 RX ADMIN — ACETAMINOPHEN 650 MG: 325 TABLET, FILM COATED ORAL at 17:19

## 2020-02-14 RX ADMIN — ACETAMINOPHEN 650 MG: 325 TABLET, FILM COATED ORAL at 04:58

## 2020-02-14 RX ADMIN — ACETAMINOPHEN 650 MG: 325 TABLET, FILM COATED ORAL at 22:31

## 2020-02-14 RX ADMIN — METFORMIN HYDROCHLORIDE 500 MG: 500 TABLET, FILM COATED ORAL at 09:21

## 2020-02-14 RX ADMIN — GUAIFENESIN 100 MG: 100 SOLUTION ORAL at 17:19

## 2020-02-14 RX ADMIN — ENOXAPARIN SODIUM 40 MG: 40 INJECTION SUBCUTANEOUS at 16:24

## 2020-02-14 RX ADMIN — ATORVASTATIN CALCIUM 80 MG: 80 TABLET, FILM COATED ORAL at 21:17

## 2020-02-14 RX ADMIN — METOPROLOL SUCCINATE 25 MG: 25 TABLET, FILM COATED, EXTENDED RELEASE ORAL at 09:21

## 2020-02-14 NOTE — PROGRESS NOTES
Problem: Mobility Impaired (Adult and Pediatric)  Goal: *Acute Goals and Plan of Care  Description  Acute PT Goals (reviewed & updated 2/12/2020)  STG:  (1.) Mr. Aristeo Rascon will demonstrate good dynamic sitting balance within 3 treatment days. (2) Mr. Aristeo Rascon will transfer sit to stand with [address] within 3 treatment days. (3.)Mr. Marcelino Cho will transfer from bed to chair and chair to bed via [address] [facility] with MODERATE ASSIST x1 using the least restrictive device within 3 treatment day(s). (4.) Mr. Aristeo Rascon will ambulate with MODERATE ASSIST for 25+ feet with the least restrictive device within 3 treatment day(s). (5.)Mr. Aristeo Rascon will tolerate sitting up in chair/wheelchair x4 hours with 0/10 pain within 3 treatment days. LTG:  (1.) Mr. Aristeo Rascon will perform bed mobility with CONTACT GUARD ASSISTANCE within 7 treatment day(s). (2.) Mr. Aristeo Rascon will transfer from bed to chair and chair to bed via [address] [facility] with MINIMAL ASSIST using the least restrictive device within 7 treatment day(s). (3.) Mr. Aristeo Rascon will ambulate with MINIMAL ASSIST for 50+ feet with the least restrictive device within 7 treatment day(s). (4.)Mr. Aristeo Rascon will demonstrate FAIR STATIC STANDING balance within 7 treatment days. MET GOALS:      PHYSICAL THERAPY: Daily Note and AM 2/14/2020  INPATIENT: PT Visit Days : 2  Payor: MEDICAID PENDING / Plan: Tulio Carpio PENDING / Product Type: Medicaid /       NAME/AGE/GENDER: Joe Silveira is a 53 y.o. male   PRIMARY DIAGNOSIS: Acute ischemic stroke ([address] [I63.9]  Cerebrovascular accident (CVA) due to embolism ([address] [R53.3] Acute embolic stroke ([address] Acute embolic stroke ([address]       ICD-10: Treatment Diagnosis:   · Difficulty in walking, Not elsewhere classified (R26.2)  · Hemiplegia and hemiparesis following cerebral infarction affecting left non-dominant side (Z24.492)   Precaution/Allergies:  Patient has no known allergies.       ASSESSMENT:      Rui Courtney is a 53year old admitted R MCA CVA with left hemiparesis. Patient seen this AM for PT treatment session: presents s/p MCGOVERN treatment and highly motivated to participate in gait training. Performed scooting in preparation for transfers with minimal assistance and squat pivot transfer bed to wheelchair with moderate assistance x1 with gait belt. Patient participated in gait training x20ft with R rail use in hallway and moderate assistance x1 with wheelchair follow. NDT to L LE to facilitate quad and glute throughout stance phase of gait and minimal assistance with advancement of L LE. Patient with improved advancement of L LE today. Also addressed stance and L LE placement with patient performing weight shifts and hip ab/adduction. Improved trunk control and posture also appreciated throughout. Main limitation is lack of quad activation on left. Participated in wheelchair mobility in hallway with cueing to left visual/spatial field and dual tasking tasks. Patient agreeable to be OOB in wheelchair this AM; RN aware. Good progress today with improved gait mechanics, trunk control, and high levels of motivation. Goals remain ongoing. Will continue to follow during acute stay. Recommend inpatient rehab at discharge; difficult discharge plan. This section established at most recent assessment   PROBLEM LIST (Impairments causing functional limitations):  1. Decreased Strength  2. Decreased ADL/Functional Activities  3. Decreased Transfer Abilities  4. Decreased Ambulation Ability/Technique  5. Decreased Balance  6. Increased Pain  7. Decreased Activity Tolerance  8. Increased Fatigue  9. Decreased Flexibility/Joint Mobility   INTERVENTIONS PLANNED: (Benefits and precautions of physical therapy have been discussed with the patient.)  1. Balance Exercise  2. Bed Mobility  3. Family Education  4. Gait Training  5. Home Exercise Program (HEP)  6. Manual Therapy  7.  Neuromuscular Re-education/Strengthening  8. Range of Motion (ROM)  9. Therapeutic Activites  10. Therapeutic Exercise/Strengthening  11. Transfer Training     TREATMENT PLAN: Frequency/Duration: 3 times a week for duration of hospital stay  Rehabilitation Potential For Stated Goals: Good     REHAB RECOMMENDATIONS (at time of discharge pending progress):    Placement: It is my opinion, based on this patient's performance to date, that Mr. Karlos Alvarenga may benefit from intensive therapy at an [address] [place] [address] after discharge due to a probable need for close medical supervision by a rehab physician, a probable need for multiple therapy disciplines and potential to make ongoing and sustainable functional improvement that is of practical value. .  Equipment:    TBD pending progress with therapy. HISTORY:   History of Present Injury/Illness (Reason for Referral):  Per H&P: \"Pt is a 52 y/o smoker with DM, HTN, who presented to ER with L leg and arm weakness, L facial droop, dysarthria. First noted L leg weakness late night 12/11 when he woke up to go to the bathroom. He was normal when he went to bed around 1030. Woke up this morning and had persistent weakness L leg and also now noted in L arm. EMS called. Noted to have slurred speech and L facial droop as well. Code S called in ER around 9am.  MRI with acute infarcts in L cerebellar hemisphere, deep frontoparietal white matter, and R paramedian yraiel. Also noted old lacunar infarcts. No large vessel occlusion on CTA, but some stenosis noted. No hx afib, TIA, CVA. No CP, palpitations, SOB. \"  Past Medical History/Comorbidities:   Mr. Karlos Alvarenga  has a past medical history of Acute ischemic stroke ([address] (12/12/2019), Acute pancreatitis (11/19/2014), Cerebrovascular accident (CVA) due to embolism ([address] (12/12/2019), Diabetes ([address] (2002), Diabetes ([address] Diabetes mellitus, and Hypertension.  He also has no past medical history of Arthritis, Asthma, Autoimmune disease ([facility] CAD (coronary artery disease), Cancer ([facility] Chronic kidney disease, COPD, Dementia, Dementia ([facility] Heart failure ([facility] Ill-defined condition, Infectious disease, Liver disease, Other ill-defined conditions(799.89), Psychiatric disorder, PUD (peptic ulcer disease), Seizures ([facility] or Sleep disorder. Mr. Fiorella Urbina  has a past surgical history that includes hx hernia repair and hx orthopaedic. Social History/Living Environment:   Home Environment: Apartment  # Steps to Enter: 12  Rails to Enter: Yes  [employer] : Bilateral  One/Two Story Residence: One story  Living Alone: No  Support Systems: Spouse/Significant Other/Partner  Patient Expects to be Discharged to[de-identified] Unknown  Current DME Used/Available at Home: None  Tub or Shower Type: Tub/Shower combination  Prior Level of Function/Work/Activity:  Independent, lives with wife in 2nd story 1 level apartment. No recent falls. Number of Personal Factors/Comorbidities that affect the Plan of Care: 1-2: MODERATE COMPLEXITY   EXAMINATION:   Most Recent Physical Functioning:   Gross Assessment:  AROM: Grossly decreased, non-functional(L UE L LE)  PROM: Within functional limits  Strength: Grossly decreased, non-functional(L UE and L LE)  Coordination: Generally decreased, functional  Tone: Abnormal(Flaccid L UE)  Sensation: Intact(Light touch B LE)               Posture:     Balance:  Sitting: Impaired  Sitting - Static: Good (unsupported)  Sitting - Dynamic: Fair (occasional)(+)  Standing: Impaired  Standing - Static: Poor(Fair - with R UE support)  Standing - Dynamic : Poor Bed Mobility:  Scooting: Minimum assistance  Wheelchair Mobility:     Transfers:  Sit to Stand: Moderate assistance  Stand to Sit: Minimum assistance  Stand Pivot Transfers: Moderate assistance;Assist x1  Interventions: Safety awareness training; Tactile cues; Verbal cues; Visual cues  Gait:     Base of Support: Center of gravity altered;Shift to right  Gait Abnormalities: Hemiplegic; Step to gait; Decreased step clearance  Distance (ft): 20 Feet (ft)  Assistive Device: Gait belt  Ambulation - Level of Assistance: Moderate assistance  Rail Use: Right   Interventions: Safety awareness training;Verbal cues; Tactile cues      Body Structures Involved:  1. Nerves  2. Voice/Speech  3. Bones  4. Joints  5. Muscles Body Functions Affected:  1. Mental  2. Sensory/Pain  3. Neuromusculoskeletal  4. Movement Related Activities and Participation Affected:  1. General Tasks and Demands  2. Mobility  3. Self Care  4. Interpersonal Interactions and Relationships   Number of elements that affect the Plan of Care: 4+: HIGH COMPLEXITY   CLINICAL PRESENTATION:   Presentation: Evolving clinical presentation with changing clinical characteristics: MODERATE COMPLEXITY   CLINICAL DECISION MAKIN [facility] Inpatient Short Form  How much difficulty does the patient currently have. .. Unable A Lot A Little None   1. Turning over in bed (including adjusting bedclothes, sheets and blankets)? [] 1   [] 2   [x] 3   [] 4   2. Sitting down on and standing up from a chair with arms ( e.g., wheelchair, bedside commode, etc.)   [] 1   [x] 2   [] 3   [] 4   3. Moving from lying on back to sitting on the side of the bed? [] 1   [] 2   [x] 3   [] 4   How much help from another person does the patient currently need. .. Total A Lot A Little None   4. Moving to and from a bed to a chair (including a wheelchair)? [] 1   [x] 2   [] 3   [] 4   5. Need to walk in hospital room? [] 1   [x] 2   [] 3   [] 4   6. Climbing 3-5 steps with a railing? [] 1   [x] 2   [] 3   [] 4   © , Trustees of [facility] [employer], under license to [employer]. All rights reserved      Score:  Initial: 13 Most Recent: 14 (Date:2020)    Interpretation of Tool:  Represents activities that are increasingly more difficult (i.e. Bed mobility, Transfers, Gait).     Medical Necessity: · Patient is expected to demonstrate progress in   · strength, range of motion, balance, coordination, and functional technique  ·  to   · increase independence with all mobility. · .  Reason for Services/Other Comments:  · Patient continues to require skilled intervention due to   · medical complications and mobility deficits which impact his level of function, safety, and independence as indicated above. · .   Use of outcome tool(s) and clinical judgement create a POC that gives a: Questionable prediction of patient's progress: MODERATE COMPLEXITY        TREATMENT:      Pre-treatment Symptoms/Complaints:  none  Pain: Initial: None Post Session:  None     Gait Training (  10 minutes):  Gait training to improve and/or restore physical functioning as related to mobility, strength and balance. Ambulated 20 Feet (ft) with Moderate assistance using a Gait belt and moderate Safety awareness training;Verbal cues; Tactile cues related to their pre-gait weight-shifts, manual cues/faciliation throughout swing and stance phase on L LE, and L LE advancement. Therapeutic Activity (   30 Minutes): Therapeutic activities including functional  bed mobility, squat pivot transfers bed to wheelchair, sit to stand transfer training, standing static and dynamic balance training, and wheelchair mobility with dural tasking and cueing to oriented to left visual/spatial field during dual tasking improve mobility, strength and balance. Required minimal visual, verbal and tactile cues to promote static and dynamic balance in standing.       Braces/Orthotics/Lines/Etc:   · Sling to L UE throughout transfers and ambulation  Treatment/Session Assessment:    · Response to Treatment:  See above  · Interdisciplinary Collaboration:   o Physical Therapist  o Physical Therapy Assistant  o Registered Nurse  o Rehabilitation Attendant  o Certified Nursing Assistant/Patient Care Technician  · After treatment position/precautions:   o Supine in bed  o Bed alarm/tab alert on  o Bed/Chair-wheels locked  o Bed in low position  o Call light within reach  o RN notified  o Side rails x 2  o PCT at bedside; patient needs met   · Compliance with Program/Exercises: Compliant all of the time  · Recommendations/Intent for next treatment session: \"Next visit will focus on advancements to more challenging activities and reduction in assistance provided\".     Total Treatment Duration:  PT Patient Time In/Time Out  Time In: 1029  Time Out: Winston 32, DPT

## 2020-02-14 NOTE — PROGRESS NOTES
02/13/20 1900   NIH Stroke Scale   Interval Other (comment)   LOC 0   LOC Questions 0   LOC Commands 0   Best Gaze 0   Visual 0   Facial Palsy 1   Motor Right Arm 0   Motor Left Arm 2   Motor Right Leg 0   Motor Left Leg 2   Limb Ataxia 0   Sensory 0   Best Language 1   Dysarthria 1   Extinction and Inattention 0   Total 7   Dual NIH with Shreyas Verdin

## 2020-02-14 NOTE — PROGRESS NOTES
Hospitalist Progress Note     Admit Date:  2019  9:07 AM   Name:  Daina Pinzon   Age:  53 y.o.  :  1975   MRN:  [id number]   PCP:  Paula Jiménez MD  Treatment Team: Attending Provider: Diamond Pallas, MD; Care Manager: Chantale Peña, JERRY; Care Manager: Callie Mir LMSW; Utilization Review: Josh Koenig RN; Nurse Practitioner: Alpesh Beltran NP; Charge Nurse: Brook Mallory RN; Charge Nurse: Alysa Reeves; Occupational Therapy Assistant: Aishwarya Weeks; Physical Therapist: Fady Campos DPT    Subjective: This is a 54 yo male with PMH of DM II, HTN who presented 19 with c/o left leg and arm weakness, left facial droop and dysarthria.  Code S called. MRI with acute infarctions in left cerebellar hemisphere, deep frontoparietal white matter and right paramedian yariel.  Also noted to have old lacunar infarcts. CTA without large vessel occlusions, however some stenosis noted.  Echo showed PFO, Cardiology to f/u outpatient for evaluation for closure.  Neurology consulted.  Started on ASA, statin.  Pt is uninsured, difficult placement, case management working on plan.        patient seen examined at bedside. No overnight events. He is doing well sitting up in chair, eating lunch. No complaints    Nursing notes and chart reviewed. Review of Systems negative with exception of pertinent positives noted above.       Objective:     Patient Vitals for the past 24 hrs:   Temp Pulse Resp BP SpO2   20 0800 97.7 °F (36.5 °C) 62 18 135/86 99 %   20 0400 97.9 °F (36.6 °C) 72 17 (!) 138/91 99 %   20 0000 97.9 °F (36.6 °C) 81 17 (!) 147/94 99 %   20 2000 98 °F (36.7 °C) 69 18 141/87 97 %   20 1601 98.2 °F (36.8 °C) 65 17 (!) 134/91 98 %   20 1200 97.7 °F (36.5 °C) 68 18 (!) 155/98 99 %     Oxygen Therapy  O2 Sat (%): 99 % (20 0800)  Pulse via Oximetry: 66 beats per minute (20 0000)  O2 Device: Room air (20 0000)    Intake/Output Summary (Last 24 hours) at 2/14/2020 1138  Last data filed at 2/14/2020 1122  Gross per 24 hour   Intake 200 ml   Output    Net 200 ml         General:    Well nourished. Alert. Left upper extremity in a sling. Able to wiggle his fingers. Able to slightly flex his hip. CV:   RRR. No murmur, rub, or gallop. Lungs:   CTAB. No wheezing, rhonchi, or rales. Abdomen:   Soft, nontender, nondistended. Extremities: Warm and dry. No cyanosis or edema. Skin:     No rashes or jaundice. Data Review:  I have reviewed all labs, meds, telemetry events, and studies from the last 24 hours.     Recent Results (from the past 24 hour(s))   GLUCOSE, POC    Collection Time: 02/14/20  6:58 AM   Result Value Ref Range    Glucose (POC) 145 (H) 65 - 100 mg/dL        All Micro Results     None          Current Meds:  Current Facility-Administered Medications   Medication Dose Route Frequency    lip protectant (BLISTEX) ointment 1 Each  1 Each Topical PRN    acetaminophen (TYLENOL) tablet 650 mg  650 mg Oral Q6H    metFORMIN (GLUCOPHAGE) tablet 500 mg  500 mg Oral BID WITH MEALS    metoprolol succinate (TOPROL-XL) XL tablet 25 mg  25 mg Oral DAILY    polyethylene glycol (MIRALAX) packet 17 g  17 g Oral DAILY PRN    guaiFENesin (ROBITUSSIN) 100 mg/5 mL oral liquid 100 mg  100 mg Oral Q4H PRN    hydrALAZINE (APRESOLINE) 20 mg/mL injection 20 mg  20 mg IntraVENous Q4H PRN    atorvastatin (LIPITOR) tablet 80 mg  80 mg Oral QHS    lisinopril (PRINIVIL, ZESTRIL) tablet 40 mg  40 mg Oral DAILY    sodium chloride (NS) flush 5-40 mL  5-40 mL IntraVENous PRN    ondansetron (ZOFRAN) injection 4 mg  4 mg IntraVENous Q4H PRN    aspirin chewable tablet 81 mg  81 mg Oral DAILY    enoxaparin (LOVENOX) injection 40 mg  40 mg SubCUTAneous Q24H    dextrose 40% (GLUTOSE) oral gel 1 Tube  15 g Oral PRN    glucagon (GLUCAGEN) injection 1 mg  1 mg IntraMUSCular PRN    dextrose (D50W) injection syrg 12.5-25 g 25-50 mL IntraVENous PRN       Other Studies (last 24 hours):  No results found. Assessment and Plan:     Hospital Problems as of 2/14/2020 Date Reviewed: 3/3/2017          Codes Class Noted - Resolved POA    PFO (patent foramen ovale) ICD-10-CM: Q21.1  ICD-9-CM: 745.5  12/17/2019 - Present Yes        Arrhythmia ICD-10-CM: I49.9  ICD-9-CM: 427.9  12/15/2019 - Present Yes        Hyperlipemia ICD-10-CM: E78.5  ICD-9-CM: 272.4  12/15/2019 - Present Yes        * (Principal) Acute embolic stroke [facility]-: I63.9  ICD-9-CM: 434.11  12/12/2019 - Present Yes        Hypertension (Chronic) ICD-10-CM: I10  ICD-9-CM: 401.9  Unknown - Present Yes        Type 2 diabetes mellitus (HCC) (Chronic) ICD-10-CM: E11.9  ICD-9-CM: 250.00  Unknown - Present Yes              PLAN:    Acute embolic stroke  -is \"outpatient ready\" so no routine labs indicated at this point  -MRI with acute infarcts in L cerebellar hemisphere, deep frontoparietal white matter, and R paramedian yariel. old lacunar infarcts. -ISMAEL: 3 mm PFO, Cardiology stated he needs an evaluation for closure PFO with high risk features including significant (3 mm) separation of septum secundum and primum as well as large shunt.  Will await recovery of CVA.  Plan is 4 week event monitor as outpatient.  Will then see in offfice and if no arrythmias will plan PFO closure  -statin, ASA     Hypertension:   -lisinopril, metoprolol     Type 2 diabetes mellitus:    -cont current     DISPO: waiting for pt to be deemed \"disabled\" which in neurologic cases have to allow for 6 months showing chronicity.   Then he can get medicaid after that.   DVT ppx:  lovenox    Signed:  Jessica Wright MD

## 2020-02-14 NOTE — PROGRESS NOTES
02/14/20 0656   NIH Stroke Scale   Interval Other (comment)   LOC 0   LOC Questions 0   LOC Commands 0   Best Gaze 0   Visual 0   Facial Palsy 1   Motor Right Arm 0   Motor Left Arm 2   Motor Right Leg 0   Motor Left Leg 2   Limb Ataxia 0   Sensory 0   Best Language 1   Dysarthria 1   Extinction and Inattention 0   Total 7   Dual NIH w/ Debbie Valenzuela

## 2020-02-14 NOTE — PROGRESS NOTES
Problem: Self Care Deficits Care Plan (Adult)  Goal: *Acute Goals and Plan of Care (Insert Text)  Description  GOALS UPDATED 1/22/2020:   1. Patient will complete dynamic sitting balance for ADLs with supervision. PROGRESSING 2/9/2020  2. Patient will demonstrate appropriate safety awareness and protection of L UE during bed mobility and functional transfers with minimal cues. (Progressing)  3. Patient will complete total body bathing and dressing with moderate assistance and adaptive equipment as needed. PROGRESSING 2/9/2020  4. Patient will complete weightbearing into the L UE with ADL tasks with minimal assistance to improve ability to use as a functional assist during ADL tasks. (Progressing)  5. Patient will demonstrate modified independence with therapeutic exercises to improve strength in L UE for ADLs/functional transfers. (Progressing)  6. Patient will complete bed mobility with stand by assistance and adaptive equipment as needed in preparation for functional transfers. PROGRESSING 2/9/2020  7. 7. Patient will complete functional transfers with minimal assistance with equipment as needed. (New goal)8. (Goal Met)    Timeframe: 7 visits       Outcome: Progressing Towards Goal     OCCUPATIONAL THERAPY: Daily Note and AM    2/14/2020  INPATIENT: OT Visit Days: 6  Payor: MEDICAID PENDING / Plan: [employer] PENDING / Product Type: Medicaid /      NAME/AGE/GENDER: Sandra Allen is a 53 y.o. male   PRIMARY DIAGNOSIS:  Acute ischemic stroke ([facility] [I63.9]  Cerebrovascular accident (CVA) due to embolism ([facility] [S45.8] Acute embolic stroke ([facility] Acute embolic stroke ([facility]       ICD-10: Treatment Diagnosis:    · Generalized Muscle Weakness (M62.81)  · Other lack of cordination (R27.8)  · Hemiplegia and hemiparesis following cerebral infarction affecting   · left non-dominant side (I69.354)  · Abnormal posture (R29.3)   Precautions/Allergies:    NO PULLING ON LUE   LUE in sling with shoulder joint approximated and supported   Patient has no known allergies. ASSESSMENT:     Mr. Ellis Nair is a 53 y.o. male presents to the hospital with L sided weakness and acute ischemic CVA. MRI revealed acute to subacute L cerebellar and R paramedian yariel infarcts. At baseline pt lives with his wife and is independent. One finger wide subluxation noted in L shoulder (1/22/2020). 2/14/2020  Pt presents in supine upon arrival. Pt transferred to sitting with min a and additional time. Pt tolerated PROM to his LUE with no c/o pain. Pt with trace shoulder elevation when completing shoulder shrugs. No other active movement present in his LUE. Pt completed lateral weight bearing through his L UE and completed SROM. Pt participated in reaching activities to increase dynamic sitting balance with emphasis on pt's awareness of the tone in his LE during sitting. Pt was left edge of bed with PT and rehab tech in the room. Good effort. Continue POC. This section established at most recent assessment   PROBLEM LIST (Impairments causing functional limitations):  1. Decreased Strength  2. Decreased ADL/Functional Activities  3. Decreased Transfer Abilities  4. Decreased Ambulation Ability/Technique  5. Decreased Balance  6. Decreased Activity Tolerance  7. Increased Fatigue  8. Decreased Flexibility/Joint Mobility  9. Decreased Knowledge of Precautions  10. Decreased [place] with Home Exercise Program   INTERVENTIONS PLANNED: (Benefits and precautions of occupational therapy have been discussed with the patient.)  1. Activities of daily living training  2. Adaptive equipment training  3. Balance training  4. Clothing management  5. Cognitive training  6. Donning&doffing training  7. Keanu tech training  8. Neuromuscular re-eduation  9. Therapeutic activity  10. Therapeutic exercise     TREATMENT PLAN: Frequency/Duration: Follow patient 3 times per week to address above goals.   Rehabilitation Potential For Stated Goals: Excellent     REHAB RECOMMENDATIONS (at time of discharge pending progress):    Placement: It is my opinion, based on this patient's performance to date, that Mr. Sebastián Ramos may benefit from intensive therapy at an [address] [place] [address] after discharge due to potential to make ongoing and sustainable functional improvement that is of practical value. Lyle Rein Pt functioning far below independent baseline, demonstrating good improvement and participation. Pt would likely benefit greatly and increase independence from inpatient rehab stay. Equipment:    TBD               OCCUPATIONAL PROFILE AND HISTORY:   History of Present Injury/Illness (Reason for Referral):  See H&P  Past Medical History/Comorbidities:   Mr. Sebastián Ramos  has a past medical history of Acute ischemic stroke ([address] (12/12/2019), Acute pancreatitis (11/19/2014), Cerebrovascular accident (CVA) due to embolism ([address] (12/12/2019), Diabetes ([address] (2002), Diabetes ([address] Diabetes mellitus, and Hypertension. He also has no past medical history of Arthritis, Asthma, Autoimmune disease ([address] CAD (coronary artery disease), Cancer ([address] Chronic kidney disease, COPD, Dementia, Dementia ([address] Heart failure ([address] Ill-defined condition, Infectious disease, Liver disease, Other ill-defined conditions(799.89), Psychiatric disorder, PUD (peptic ulcer disease), Seizures ([address] or Sleep disorder. Mr. Sebastián Ramos  has a past surgical history that includes hx hernia repair and hx orthopaedic. Social History/Living Environment:   Home Environment: Apartment  # Steps to Enter: 12  Rails to Enter: Yes  [employer] : Bilateral  One/Two Story Residence: One story  Living Alone: No  Support Systems: Spouse/Significant Other/Partner  Patient Expects to be Discharged to[de-identified] Unknown  Current DME Used/Available at Home: None  Tub or Shower Type: Tub/Shower combination  Prior Level of Function/Work/Activity:  Pt lives at home with his wife. Pt is typically independent with ADL/functional mobility.  Pt does not drive. Pt was working part-time at [employer]. Personal Factors:          Age:  53 y.o. Past/Current Experience:  CVA with flaccid L side        Other factors that influence how disability is experienced by the patient:  multiple co-morbidities    Number of Personal Factors/Comorbidities that affect the Plan of Care: Extensive review of physical, cognitive, and psychosocial performance (3+):  HIGH COMPLEXITY   ASSESSMENT OF OCCUPATIONAL PERFORMANCE[de-identified]   Activities of Daily Living:   Basic ADLs (From Assessment) Complex ADLs (From Assessment)   Feeding: Setup  Oral Facial Hygiene/Grooming: Moderate assistance  Bathing: Moderate assistance  Upper Body Dressing: Maximum assistance  Lower Body Dressing: Total assistance  Toileting: Total assistance Instrumental ADL  Meal Preparation: Total assistance  Homemaking: Total assistance   Grooming/Bathing/Dressing Activities of Daily Living         Upper Body Bathing  Bathing Assistance: Moderate assistance (minimal for bathing LUE)  Position Performed: Seated edge of bed                   Bed/Mat Mobility  Supine to Sit: Minimum assistance; Additional time  Sit to Stand: Moderate assistance  Stand to Sit: Minimum assistance  Scooting: Minimum assistance     Most Recent Physical Functioning:   Gross Assessment:                  Posture:     Balance:  Sitting: Impaired  Sitting - Static: Good (unsupported)  Sitting - Dynamic: Fair (occasional)(+)  Standing: Impaired  Standing - Static: Poor(Fair - with R UE support)  Standing - Dynamic : Poor Bed Mobility:  Supine to Sit: Minimum assistance; Additional time  Scooting: Minimum assistance  Wheelchair Mobility:     Transfers:  Sit to Stand: Moderate assistance  Stand to Sit: Minimum assistance  Stand Pivot Transfers: Moderate assistance;Assist x1  Interventions: Safety awareness training; Tactile cues; Verbal cues; Visual cues            Patient Vitals for the past 6 hrs:   BP BP Patient Position SpO2 Pulse   02/14/20 0800 135/86 At rest 99 % 62   02/14/20 1200 127/87 At rest 97 % 77       Mental Status  Neurologic State: Alert  Orientation Level: Oriented X4  Cognition: Follows commands  Perception: Cues to maintain midline in sitting, Cues to maintain midline in standing(pt fatigued this afternoon)  Perseveration: No perseveration noted  Safety/Judgement: Awareness of environment, Fall prevention, Decreased insight into deficits                          Physical Skills Involved:  1. Range of Motion  2. Balance  3. Strength  4. Activity Tolerance  5. Fine Motor Control  6. Gross Motor Control Cognitive Skills Affected (resulting in the inability to perform in a timely and safe manner):  1. Perception  2. Expression Psychosocial Skills Affected:  1. Habits/Routines  2. Environmental Adaptation  3. Social Interaction  4. Emotional Regulation  5. Self-Awareness  6. Awareness of Others  7. Social Roles   Number of elements that affect the Plan of Care: 5+:  HIGH COMPLEXITY   CLINICAL DECISION MAKING:   [facility] AM-PAC 6 Clicks   Daily Activity Inpatient Short Form  How much help from another person does the patient currently need. .. Total A Lot A Little None   1. Putting on and taking off regular lower body clothing? [x] 1   [] 2   [] 3   [] 4   2. Bathing (including washing, rinsing, drying)? [] 1   [x] 2   [] 3   [] 4   3. Toileting, which includes using toilet, bedpan or urinal?   [] 1   [x] 2   [] 3   [] 4   4. Putting on and taking off regular upper body clothing? [] 1   [x] 2   [] 3   [] 4   5. Taking care of personal grooming such as brushing teeth? [] 1   [x] 2   [] 3   [] 4   6. Eating meals? [] 1   [] 2   [x] 3   [] 4   © 2007, Trustees of [employer], under license to [employer].  All rights reserved      Score:  Initial: 11 Most Recent: 12 (Date: 1/22/2020 )    Interpretation of Tool:  Represents activities that are increasingly more difficult (i.e. Bed mobility, Transfers, Gait).    Medical Necessity:     · Patient demonstrates   · good and excellent  ·  rehab potential due to higher previous functional level. Reason for Services/Other Comments:  · Patient continues to require skilled intervention due to   · Decreased independence with ADL/functional transfers that impacts overall quality of life. · .   Use of outcome tool(s) and clinical judgement create a POC that gives a: MODERATE COMPLEXITY         TREATMENT:   (In addition to Assessment/Re-Assessment sessions the following treatments were rendered)     Pre-treatment Symptoms/Complaints:    Pain: Initial:   Pain Intensity 1: 0  Pain Location 1: Shoulder  Pain Orientation 1: Left  Pain Intervention(s) 1: Exercise, Repositioned  Post Session:  0   Neuromuscular Re-education: (25 minutes):  Exercise/activities per grid below to improve balance, coordination, kinesthetic sense, posture and proprioception. Required minimal visual, verbal and manual cues to promote dynamic balance in standing. LUE Re-Education  Other Activities: PROM all planes; lateral WBing Trunk Re-Education  Other Activities: weight shifting; reaching activities for dynamic sitting balance    Therapeutic Activity: (13 minutes): Therapeutic activities including Bed transfers and edge of bed sitting to improve mobility, strength and balance. Required minimal Safety awareness training;Verbal cues; Tactile cues to promote dynamic balance in sitting.               Date:  1/27/2020 Date:  2/11/2020 Date:     Activity/Exercise Parameters Parameters Parameters   LUE finger flexion, AROM gravity minimized then AAROM to complete ROM 2x10 reps 2x10 reps    LUE finger extension AAROM 2x10 reps 2x10 reps    LUE wrist extension, AROM gravity minimized then AAROM to complete ROM 1x10 reps 1x10 reps    LUE wrist extension, AROM against gravity  1x10 reps 1x10 reps    LUE wrist flexion AAROM 2x10 reps 1x10 reps    LUE elbow flexion/extension PROM x10 reps 1x10 reps    LUE shoulder flexion PROM x10 reps 1x10 reps         Braces/Orthotics/Lines/Etc:   · LUE sling   · O2 device: Room air  · Treatment/Session Assessment:    · Response to Treatment:  Pt demonstrated good participation. · Interdisciplinary Collaboration:   o Certified Occupational Therapy Assistant  o Rehabilitation Attendant  · After treatment position/precautions:   o Bed alarm/tab alert on  o RN notified  o working with PT   · Compliance with Program/Exercises: Compliant all of the time, Will assess as treatment progresses. · Recommendations/Intent for next treatment session: \"Next visit will focus on advancements to more challenging activities and reduction in assistance provided\".   Total Treatment Duration:  OT Patient Time In/Time Out  Time In: 0950  Time Out: 6[address] [facility] [place]

## 2020-02-15 PROCEDURE — 74011250637 HC RX REV CODE- 250/637: Performed by: HOSPITALIST

## 2020-02-15 PROCEDURE — 65270000029 HC RM PRIVATE

## 2020-02-15 PROCEDURE — 74011250637 HC RX REV CODE- 250/637: Performed by: INTERNAL MEDICINE

## 2020-02-15 PROCEDURE — 74011250636 HC RX REV CODE- 250/636: Performed by: INTERNAL MEDICINE

## 2020-02-15 RX ADMIN — ENOXAPARIN SODIUM 40 MG: 40 INJECTION SUBCUTANEOUS at 14:42

## 2020-02-15 RX ADMIN — LISINOPRIL 40 MG: 20 TABLET ORAL at 08:44

## 2020-02-15 RX ADMIN — ACETAMINOPHEN 650 MG: 325 TABLET, FILM COATED ORAL at 05:14

## 2020-02-15 RX ADMIN — ACETAMINOPHEN 650 MG: 325 TABLET, FILM COATED ORAL at 23:26

## 2020-02-15 RX ADMIN — ACETAMINOPHEN 650 MG: 325 TABLET, FILM COATED ORAL at 18:05

## 2020-02-15 RX ADMIN — METOPROLOL SUCCINATE 25 MG: 25 TABLET, FILM COATED, EXTENDED RELEASE ORAL at 08:44

## 2020-02-15 RX ADMIN — METFORMIN HYDROCHLORIDE 500 MG: 500 TABLET, FILM COATED ORAL at 08:44

## 2020-02-15 RX ADMIN — ASPIRIN 81 MG 81 MG: 81 TABLET ORAL at 08:44

## 2020-02-15 RX ADMIN — ATORVASTATIN CALCIUM 80 MG: 80 TABLET, FILM COATED ORAL at 20:07

## 2020-02-15 RX ADMIN — ACETAMINOPHEN 650 MG: 325 TABLET, FILM COATED ORAL at 14:42

## 2020-02-15 RX ADMIN — METFORMIN HYDROCHLORIDE 500 MG: 500 TABLET, FILM COATED ORAL at 18:05

## 2020-02-15 RX ADMIN — Medication 5 ML: at 14:43

## 2020-02-15 NOTE — PROGRESS NOTES
Hospitalist Progress Note     Admit Date:  2019  9:07 AM   Name:  Kath Huizar   Age:  53 y.o.  :  1975   MRN:  [id number]   PCP:  Vera Jasmine MD  Treatment Team: Attending Provider: Anahy Joe MD; Care Manager: Mi Hoyos, RN; Care Manager: Klaudia Adair LMSW; Utilization Review: Ellen Morataya RN; Nurse Practitioner: Melanie Gustafson NP; Charge Nurse: Max Samson RN    Subjective: This is a 52 yo male with PMH of DM II, HTN who presented 19 with c/o left leg and arm weakness, left facial droop and dysarthria.  Code S called. MRI with acute infarctions in left cerebellar hemisphere, deep frontoparietal white matter and right paramedian yariel.  Also noted to have old lacunar infarcts. CTA without large vessel occlusions, however some stenosis noted.  Echo showed PFO, Cardiology to f/u outpatient for evaluation for closure.  Neurology consulted.  Started on ASA, statin.  Pt is uninsured, difficult placement, case management working on plan.      2/15 patient seen and examined at bedside. No overnight events. Doing well without complaints. Nursing notes and chart reviewed. Review of Systems negative with exception of pertinent positives noted above.       Objective:     Patient Vitals for the past 24 hrs:   Temp Pulse Resp BP SpO2   02/15/20 1032 97.9 °F (36.6 °C) 61 18 131/87 96 %   02/15/20 0706 98 °F (36.7 °C) 65 18 115/75 97 %   02/15/20 0340 97.5 °F (36.4 °C) 68 17 117/72 97 %   20 2318 97.6 °F (36.4 °C) 69 17 121/80 97 %   20 2008 97.9 °F (36.6 °C) 76 17 123/78 99 %   20 1455 97.8 °F (36.6 °C) 66 18 130/85 98 %   20 1200 98.3 °F (36.8 °C) 77 18 127/87 97 %     Oxygen Therapy  O2 Sat (%): 96 % (02/15/20 1032)  Pulse via Oximetry: 66 beats per minute (20 0000)  O2 Device: Room air (20 0000)    Intake/Output Summary (Last 24 hours) at 2/15/2020 1115  Last data filed at 2/15/2020 0807  Gross per 24 hour   Intake 700 ml   Output    Net 700 ml         General:    Well nourished. Alert. LUE still in sling. CV:   RRR. No murmur, rub, or gallop. Lungs:   CTAB. No wheezing, rhonchi, or rales. Abdomen:   Soft, nontender, nondistended. Extremities: Warm and dry. No cyanosis or edema. Skin:     No rashes or jaundice. Data Review:  I have reviewed all labs, meds, telemetry events, and studies from the last 24 hours. No results found for this or any previous visit (from the past 24 hour(s)). All Micro Results     None          Current Meds:  Current Facility-Administered Medications   Medication Dose Route Frequency    lip protectant (BLISTEX) ointment 1 Each  1 Each Topical PRN    acetaminophen (TYLENOL) tablet 650 mg  650 mg Oral Q6H    metFORMIN (GLUCOPHAGE) tablet 500 mg  500 mg Oral BID WITH MEALS    metoprolol succinate (TOPROL-XL) XL tablet 25 mg  25 mg Oral DAILY    polyethylene glycol (MIRALAX) packet 17 g  17 g Oral DAILY PRN    guaiFENesin (ROBITUSSIN) 100 mg/5 mL oral liquid 100 mg  100 mg Oral Q4H PRN    hydrALAZINE (APRESOLINE) 20 mg/mL injection 20 mg  20 mg IntraVENous Q4H PRN    atorvastatin (LIPITOR) tablet 80 mg  80 mg Oral QHS    lisinopril (PRINIVIL, ZESTRIL) tablet 40 mg  40 mg Oral DAILY    sodium chloride (NS) flush 5-40 mL  5-40 mL IntraVENous PRN    ondansetron (ZOFRAN) injection 4 mg  4 mg IntraVENous Q4H PRN    aspirin chewable tablet 81 mg  81 mg Oral DAILY    enoxaparin (LOVENOX) injection 40 mg  40 mg SubCUTAneous Q24H    dextrose 40% (GLUTOSE) oral gel 1 Tube  15 g Oral PRN    glucagon (GLUCAGEN) injection 1 mg  1 mg IntraMUSCular PRN    dextrose (D50W) injection syrg 12.5-25 g  25-50 mL IntraVENous PRN       Other Studies (last 24 hours):  No results found.     Assessment and Plan:     Hospital Problems as of 2/15/2020 Date Reviewed: 3/3/2017          Codes Class Noted - Resolved POA    PFO (patent foramen ovale) ICD-10-CM: Q21.1  ICD-9-CM: 745.5  12/17/2019 - Present Yes        Arrhythmia ICD-10-CM: I49.9  ICD-9-CM: 427.9  12/15/2019 - Present Yes        Hyperlipemia ICD-10-CM: E78.5  ICD-9-CM: 272.4  12/15/2019 - Present Yes        * (Principal) Acute embolic stroke [facility]) NJS-02-BQ: I63.9  ICD-9-CM: 434.11  12/12/2019 - Present Yes        Hypertension (Chronic) ICD-10-CM: I10  ICD-9-CM: 401.9  Unknown - Present Yes        Type 2 diabetes mellitus (HCC) (Chronic) ICD-10-CM: E11.9  ICD-9-CM: 250.00  Unknown - Present Yes              PLAN:    Acute embolic stroke  -is \"outpatient ready\" so no routine labs indicated at this point  -MRI with acute infarcts in L cerebellar hemisphere, deep frontoparietal white matter, and R paramedian yariel. old lacunar infarcts.    -ISMAEL: 3 mm PFO, Cardiology stated he needs an evaluation for closure PFO with high risk features including significant (3 mm) separation of septum secundum and primum as well as large shunt.  Will await recovery of CVA.  Plan is 4 week event monitor as outpatient.  Will then see in offfice and if no arrythmias will plan PFO closure  -statin, ASA     Hypertension:   -lisinopril, metoprolol     Type 2 diabetes mellitus:    -cont current     DISPO: waiting for pt to be deemed \"disabled\" which in neurologic cases have to allow for 6 months showing chronicity.  Then he can get medicaid after that.   DVT ppx:  lovenox      Signed:  Sapphire Jarrett MD

## 2020-02-16 LAB — GLUCOSE BLD STRIP.AUTO-MCNC: 131 MG/DL (ref 65–100)

## 2020-02-16 PROCEDURE — 82962 GLUCOSE BLOOD TEST: CPT

## 2020-02-16 PROCEDURE — 74011250637 HC RX REV CODE- 250/637: Performed by: HOSPITALIST

## 2020-02-16 PROCEDURE — 65270000029 HC RM PRIVATE

## 2020-02-16 PROCEDURE — 74011250636 HC RX REV CODE- 250/636: Performed by: INTERNAL MEDICINE

## 2020-02-16 PROCEDURE — 74011250637 HC RX REV CODE- 250/637: Performed by: INTERNAL MEDICINE

## 2020-02-16 RX ADMIN — ASPIRIN 81 MG 81 MG: 81 TABLET ORAL at 10:10

## 2020-02-16 RX ADMIN — ACETAMINOPHEN 650 MG: 325 TABLET, FILM COATED ORAL at 14:00

## 2020-02-16 RX ADMIN — ACETAMINOPHEN 650 MG: 325 TABLET, FILM COATED ORAL at 23:03

## 2020-02-16 RX ADMIN — ACETAMINOPHEN 650 MG: 325 TABLET, FILM COATED ORAL at 05:06

## 2020-02-16 RX ADMIN — ACETAMINOPHEN 650 MG: 325 TABLET, FILM COATED ORAL at 18:22

## 2020-02-16 RX ADMIN — METFORMIN HYDROCHLORIDE 500 MG: 500 TABLET, FILM COATED ORAL at 10:10

## 2020-02-16 RX ADMIN — ENOXAPARIN SODIUM 40 MG: 40 INJECTION SUBCUTANEOUS at 14:00

## 2020-02-16 RX ADMIN — GUAIFENESIN 100 MG: 100 SOLUTION ORAL at 18:24

## 2020-02-16 RX ADMIN — METOPROLOL SUCCINATE 25 MG: 25 TABLET, FILM COATED, EXTENDED RELEASE ORAL at 10:10

## 2020-02-16 RX ADMIN — ATORVASTATIN CALCIUM 80 MG: 80 TABLET, FILM COATED ORAL at 20:21

## 2020-02-16 RX ADMIN — METFORMIN HYDROCHLORIDE 500 MG: 500 TABLET, FILM COATED ORAL at 18:22

## 2020-02-16 RX ADMIN — LISINOPRIL 40 MG: 20 TABLET ORAL at 10:10

## 2020-02-16 NOTE — PROGRESS NOTES
Problem: Patient Education: Go to Patient Education Activity  Goal: Patient/Family Education  Outcome: Progressing Towards Goal     Problem: Pressure Injury - Risk of  Goal: *Prevention of pressure injury  Description  Document Dewey Scale and appropriate interventions in the flowsheet. Outcome: Progressing Towards Goal  Note: Pressure Injury Interventions:  Sensory Interventions: Assess changes in LOC    Moisture Interventions: Absorbent underpads, Apply protective barrier, creams and emollients, Check for incontinence Q2 hours and as needed, Limit adult briefs, Maintain skin hydration (lotion/cream), Minimize layers, Moisture barrier, Offer toileting Q_hr    Activity Interventions: Increase time out of bed, Pressure redistribution bed/mattress(bed type), PT/OT evaluation    Mobility Interventions: HOB 30 degrees or less, Pressure redistribution bed/mattress (bed type), PT/OT evaluation, Turn and reposition approx. every two hours(pillow and wedges)    Nutrition Interventions: Document food/fluid/supplement intake    Friction and Shear Interventions: Apply protective barrier, creams and emollients, Foam dressings/transparent film/skin sealants, HOB 30 degrees or less, Minimize layers                Problem: Patient Education: Go to Patient Education Activity  Goal: Patient/Family Education  Outcome: Progressing Towards Goal     Problem: Falls - Risk of  Goal: *Absence of Falls  Description  Document Paula Wilson Fall Risk and appropriate interventions in the flowsheet.   Outcome: Progressing Towards Goal  Note: Fall Risk Interventions:  Mobility Interventions: Bed/chair exit alarm, Communicate number of staff needed for ambulation/transfer, Patient to call before getting OOB, Utilize walker, cane, or other assistive device    Mentation Interventions: Adequate sleep, hydration, pain control, Bed/chair exit alarm    Medication Interventions: Bed/chair exit alarm, Patient to call before getting OOB, Teach patient to arise slowly    Elimination Interventions: Bed/chair exit alarm, Call light in reach, Patient to call for help with toileting needs, Toilet paper/wipes in reach, Toileting schedule/hourly rounds, Urinal in reach    History of Falls Interventions: Bed/chair exit alarm, Door open when patient unattended         Problem: Patient Education: Go to Patient Education Activity  Goal: Patient/Family Education  Outcome: Progressing Towards Goal     Problem: Diabetes Self-Management  Goal: *Disease process and treatment process  Description  Define diabetes and identify own type of diabetes; list 3 options for treating diabetes. Outcome: Progressing Towards Goal  Goal: *Incorporating nutritional management into lifestyle  Description  Describe effect of type, amount and timing of food on blood glucose; list 3 methods for planning meals. Outcome: Progressing Towards Goal  Goal: *Incorporating physical activity into lifestyle  Description  State effect of exercise on blood glucose levels. Outcome: Progressing Towards Goal  Goal: *Developing strategies to promote health/change behavior  Description  Define the ABC's of diabetes; identify appropriate screenings, schedule and personal plan for screenings. Outcome: Progressing Towards Goal  Goal: *Using medications safely  Description  State effect of diabetes medications on diabetes; name diabetes medication taking, action and side effects. Outcome: Progressing Towards Goal  Goal: *Monitoring blood glucose, interpreting and using results  Description  Identify recommended blood glucose targets  and personal targets. Outcome: Progressing Towards Goal  Goal: *Prevention, detection, treatment of acute complications  Description  List symptoms of hyper- and hypoglycemia; describe how to treat low blood sugar and actions for lowering  high blood glucose level.   Outcome: Progressing Towards Goal  Goal: *Prevention, detection and treatment of chronic complications  Description  Define the natural course of diabetes and describe the relationship of blood glucose levels to long term complications of diabetes.   Outcome: Progressing Towards Goal  Goal: *Developing strategies to address psychosocial issues  Description  Describe feelings about living with diabetes; identify support needed and support network  Outcome: Progressing Towards Goal     Problem: Patient Education: Go to Patient Education Activity  Goal: Patient/Family Education  Outcome: Progressing Towards Goal     Problem: Nutrition Deficit  Goal: *Optimize nutritional status  Outcome: Progressing Towards Goal     Problem: General Medical Care Plan  Goal: *Vital signs within specified parameters  Outcome: Progressing Towards Goal  Goal: *Labs within defined limits  Outcome: Progressing Towards Goal  Goal: *Absence of infection signs and symptoms  Description  Wash hand more often   Outcome: Progressing Towards Goal  Goal: *Optimal pain control at patient's stated goal  Outcome: Progressing Towards Goal  Goal: *Skin integrity maintained  Outcome: Progressing Towards Goal  Goal: *Fluid volume balance  Outcome: Progressing Towards Goal  Goal: *Optimize nutritional status  Outcome: Progressing Towards Goal  Goal: *Anxiety reduced or absent  Outcome: Progressing Towards Goal  Goal: *Progressive mobility and function (eg: ADL's)  Outcome: Progressing Towards Goal     Problem: Patient Education: Go to Patient Education Activity  Goal: Patient/Family Education  Outcome: Progressing Towards Goal     Problem: Patient Education: Go to Patient Education Activity  Goal: Patient/Family Education  Outcome: Progressing Towards Goal     Problem: Patient Education: Go to Patient Education Activity  Goal: Patient/Family Education  Outcome: Progressing Towards Goal     Problem: Patient Education: Go to Patient Education Activity  Goal: Patient/Family Education  Outcome: Progressing Towards Goal     Problem: Pain  Goal: *Control of Pain  Outcome: Progressing Towards Goal     Problem: Patient Education: Go to Patient Education Activity  Goal: Patient/Family Education  Outcome: Progressing Towards Goal

## 2020-02-16 NOTE — PROGRESS NOTES
Hospitalist Progress Note     Admit Date:  2019  9:07 AM   Name:  Samantha Talavera   Age:  53 y.o.  :  1975   MRN:  [id number]   PCP:  Ramya Wiseman MD  Treatment Team: Attending Provider: Orlando Ramirez MD; Care Manager: Tenzin Berger RN; Care Manager: Desmond Dominguez LM; Utilization Review: John Poole RN; Nurse Practitioner: Saul Solitario NP    Subjective: This is a 52 yo male with PMH of DM II, HTN who presented 17 with c/o left leg and arm weakness, left facial droop and dysarthria.  Code S called. MRI with acute infarctions in left cerebellar hemisphere, deep frontoparietal white matter and right paramedian yariel.  Also noted to have old lacunar infarcts. CTA without large vessel occlusions, however some stenosis noted.  Echo showed PFO, Cardiology to f/u outpatient for evaluation for closure.  Neurology consulted.  Started on ASA, statin.  Pt is uninsured, difficult placement, case management working on plan.        patient seen examined at bedside. No overnight events. No complaints today. Nursing notes and chart reviewed. Review of Systems negative with exception of pertinent positives noted above. Objective:     Patient Vitals for the past 24 hrs:   Temp Pulse Resp BP SpO2   20 1200 97.8 °F (36.6 °C) 67 20 (!) 145/93 96 %   20 0800 97.5 °F (36.4 °C) 64 20 137/87 94 %   20 0439 98.1 °F (36.7 °C) 65 20 115/77 95 %   20 0015 98.3 °F (36.8 °C) 63 19 125/79 94 %   02/15/20 1850 98 °F (36.7 °C) 65 18 122/80 97 %   02/15/20 1503 97.5 °F (36.4 °C) 70 18 128/87 99 %     Oxygen Therapy  O2 Sat (%): 96 % (20 1200)  Pulse via Oximetry: 66 beats per minute (20 0000)  O2 Device: Room air (20 0000)    Intake/Output Summary (Last 24 hours) at 2020 1336  Last data filed at 2/15/2020 1732  Gross per 24 hour   Intake 300 ml   Output    Net 300 ml         General:    Well nourished. Alert.   LUE still in sling  CV: RRR.  No murmur, rub, or gallop. Lungs:   CTAB. No wheezing, rhonchi, or rales. Abdomen:   Soft, nontender, nondistended. Extremities: Warm and dry. No cyanosis or edema. Skin:     No rashes or jaundice. Data Review:  I have reviewed all labs, meds, telemetry events, and studies from the last 24 hours. Recent Results (from the past 24 hour(s))   GLUCOSE, POC    Collection Time: 02/16/20  7:55 AM   Result Value Ref Range    Glucose (POC) 131 (H) 65 - 100 mg/dL        All Micro Results     None          Current Meds:  Current Facility-Administered Medications   Medication Dose Route Frequency    lip protectant (BLISTEX) ointment 1 Each  1 Each Topical PRN    acetaminophen (TYLENOL) tablet 650 mg  650 mg Oral Q6H    metFORMIN (GLUCOPHAGE) tablet 500 mg  500 mg Oral BID WITH MEALS    metoprolol succinate (TOPROL-XL) XL tablet 25 mg  25 mg Oral DAILY    polyethylene glycol (MIRALAX) packet 17 g  17 g Oral DAILY PRN    guaiFENesin (ROBITUSSIN) 100 mg/5 mL oral liquid 100 mg  100 mg Oral Q4H PRN    hydrALAZINE (APRESOLINE) 20 mg/mL injection 20 mg  20 mg IntraVENous Q4H PRN    atorvastatin (LIPITOR) tablet 80 mg  80 mg Oral QHS    lisinopril (PRINIVIL, ZESTRIL) tablet 40 mg  40 mg Oral DAILY    sodium chloride (NS) flush 5-40 mL  5-40 mL IntraVENous PRN    ondansetron (ZOFRAN) injection 4 mg  4 mg IntraVENous Q4H PRN    aspirin chewable tablet 81 mg  81 mg Oral DAILY    enoxaparin (LOVENOX) injection 40 mg  40 mg SubCUTAneous Q24H    dextrose 40% (GLUTOSE) oral gel 1 Tube  15 g Oral PRN    glucagon (GLUCAGEN) injection 1 mg  1 mg IntraMUSCular PRN    dextrose (D50W) injection syrg 12.5-25 g  25-50 mL IntraVENous PRN       Other Studies (last 24 hours):  No results found.     Assessment and Plan:     Hospital Problems as of 2/16/2020 Date Reviewed: 3/3/2017          Codes Class Noted - Resolved POA    PFO (patent foramen ovale) ICD-10-CM: Q21.1  ICD-9-CM: 745.5  12/17/2019 - Present Yes Arrhythmia ICD-10-CM: I49.9  ICD-9-CM: 427.9  12/15/2019 - Present Yes        Hyperlipemia ICD-10-CM: E78.5  ICD-9-CM: 272.4  12/15/2019 - Present Yes        * (Principal) Acute embolic stroke [facility]) DLP-11-MA: I63.9  ICD-9-CM: 434.11  12/12/2019 - Present Yes        Hypertension (Chronic) ICD-10-CM: I10  ICD-9-CM: 401.9  Unknown - Present Yes        Type 2 diabetes mellitus (HCC) (Chronic) ICD-10-CM: E11.9  ICD-9-CM: 250.00  Unknown - Present Yes              PLAN:    Acute embolic stroke  -is \"outpatient ready\" so no routine labs indicated at this point  -MRI with acute infarcts in L cerebellar hemisphere, deep frontoparietal white matter, and R paramedian yariel. old lacunar infarcts.    -ISMAEL: 3 mm PFO, Cardiology stated he needs an evaluation for closure PFO with high risk features including significant (3 mm) separation of septum secundum and primum as well as large shunt.  Will await recovery of CVA.  Plan is 4 week event monitor as outpatient.  Will then see in offfice and if no arrythmias will plan PFO closure  -statin, ASA     Hypertension:   -lisinopril, metoprolol     Type 2 diabetes mellitus:    -cont current     DISPO: waiting for pt to be deemed \"disabled\" which in neurologic cases have to allow for 6 months showing chronicity.  Then he can get medicaid after that.   DVT ppx:  lovenox    Signed:  Inna Sinclair MD

## 2020-02-17 LAB
GLUCOSE BLD STRIP.AUTO-MCNC: 107 MG/DL (ref 65–100)
GLUCOSE BLD STRIP.AUTO-MCNC: 152 MG/DL (ref 65–100)

## 2020-02-17 PROCEDURE — 74011250637 HC RX REV CODE- 250/637: Performed by: INTERNAL MEDICINE

## 2020-02-17 PROCEDURE — 82962 GLUCOSE BLOOD TEST: CPT

## 2020-02-17 PROCEDURE — 65270000029 HC RM PRIVATE

## 2020-02-17 PROCEDURE — 74011250637 HC RX REV CODE- 250/637: Performed by: HOSPITALIST

## 2020-02-17 PROCEDURE — 97530 THERAPEUTIC ACTIVITIES: CPT

## 2020-02-17 PROCEDURE — 74011250636 HC RX REV CODE- 250/636: Performed by: INTERNAL MEDICINE

## 2020-02-17 PROCEDURE — 97116 GAIT TRAINING THERAPY: CPT

## 2020-02-17 RX ADMIN — ACETAMINOPHEN 650 MG: 325 TABLET, FILM COATED ORAL at 11:45

## 2020-02-17 RX ADMIN — METFORMIN HYDROCHLORIDE 500 MG: 500 TABLET, FILM COATED ORAL at 16:22

## 2020-02-17 RX ADMIN — ASPIRIN 81 MG 81 MG: 81 TABLET ORAL at 08:37

## 2020-02-17 RX ADMIN — ENOXAPARIN SODIUM 40 MG: 40 INJECTION SUBCUTANEOUS at 16:23

## 2020-02-17 RX ADMIN — METOPROLOL SUCCINATE 25 MG: 25 TABLET, FILM COATED, EXTENDED RELEASE ORAL at 08:36

## 2020-02-17 RX ADMIN — ACETAMINOPHEN 650 MG: 325 TABLET, FILM COATED ORAL at 05:14

## 2020-02-17 RX ADMIN — LISINOPRIL 40 MG: 20 TABLET ORAL at 08:37

## 2020-02-17 RX ADMIN — METFORMIN HYDROCHLORIDE 500 MG: 500 TABLET, FILM COATED ORAL at 08:37

## 2020-02-17 RX ADMIN — ACETAMINOPHEN 650 MG: 325 TABLET, FILM COATED ORAL at 23:39

## 2020-02-17 RX ADMIN — ATORVASTATIN CALCIUM 80 MG: 80 TABLET, FILM COATED ORAL at 20:27

## 2020-02-17 RX ADMIN — GUAIFENESIN 100 MG: 100 SOLUTION ORAL at 20:27

## 2020-02-17 RX ADMIN — ACETAMINOPHEN 650 MG: 325 TABLET, FILM COATED ORAL at 17:20

## 2020-02-17 NOTE — PROGRESS NOTES
Nutrition Assessment for:   Follow up     Assessment: Pt is 54 yo male who presented with weakness, facial drooping and slurred speech. He has history of CVA, DM type 2, HTN, pancreatitis, GERD and a smoker. Pt reports he continues to eat ad aleena, feels he is eating enough. Tray in room ~50% consumed and portion of glucerna consumed. Diet: DIET NUTRITIONAL SUPPLEMENTS All Meals; Glucerna Shake  DIET DIABETIC CONSISTENT CARB Mechanical Soft      Results for Iron Sue (MRN [id number]) as of 2/17/2020 15:21   Ref. Range 2/13/2020 07:08 2/14/2020 06:58 2/16/2020 07:55 2/17/2020 07:16 2/17/2020 11:02   GLUCOSE,FAST - POC Latest Ref Range: 65 - 100 mg/dL 91 145 (H) 131 (H) 107 (H) 152 (H)      Anthropometrics:  Height: 5' 6\" (167.6 cm), Weight: 95.3 kg (210 lb), source not specified, Body mass index is 33.89 kg/m². BMI class of obese.     Macronutrient needs: MSJ (using CBW (Current body weight) unknown 95.3 kg)  EER: 2130 kcal/day (22 kcals/kg )  EPR: 106 g/day (1.1 g/kg ) (20%)     Intake/Comparative Standards: Per patient, consumes % of meals, portions of glucerna. This meets % of kcal needs and % of protein needs.     Nutrition Intervention:  Meals and snacks: Continue current diet. Progressions per SLP. Medical food supplement therapy: Continue Glucerna TID (Nila Stallings). Blood glucose remains appropriate for continued use. Coordination of Nutrition Care: Discussed with Ruben Funk RN. Discharge Nutrition Plan: With improving PO intake, frequency of Glucerna may be reduced or discontinued at discharge.     [address] [facility], [address], [address]

## 2020-02-17 NOTE — PROGRESS NOTES
Problem: Mobility Impaired (Adult and Pediatric)  Goal: *Acute Goals and Plan of Care  Description  Acute PT Goals (reviewed & updated 2/12/2020)  STG:  (1.) Mr. Patty Brown will demonstrate good dynamic sitting balance within 3 treatment days. (2) Mr. Patty Brown will transfer sit to stand with [facility] within 3 treatment days. (3.)Mr. Chino Laureano will transfer from bed to chair and chair to bed via [facility] with MODERATE ASSIST x1 using the least restrictive device within 3 treatment day(s). (4.) Mr. Patty Brown will ambulate with MODERATE ASSIST for 25+ feet with the least restrictive device within 3 treatment day(s). (5.)Mr. Patty Brown will tolerate sitting up in chair/wheelchair x4 hours with 0/10 pain within 3 treatment days. LTG:  (1.) Mr. Patty Brown will perform bed mobility with CONTACT GUARD ASSISTANCE within 7 treatment day(s). (2.) Mr. Patty Brown will transfer from bed to chair and chair to bed via [facility] with MINIMAL ASSIST using the least restrictive device within 7 treatment day(s). (3.) Mr. Patty Brown will ambulate with MINIMAL ASSIST for 50+ feet with the least restrictive device within 7 treatment day(s). (4.)Mr. Patty Brown will demonstrate FAIR STATIC STANDING balance within 7 treatment days. MET GOALS:      PHYSICAL THERAPY: Daily Note and AM 2/17/2020  INPATIENT: PT Visit Days : 3  Payor: MEDICAID PENDING / Plan: [facility] / Product Type: Medicaid /       NAME/AGE/GENDER: Kervin Milian is a 53 y.o. male   PRIMARY DIAGNOSIS: Acute ischemic stroke ([address] [I63.9]  Cerebrovascular accident (CVA) due to embolism ([address] [C06.2] Acute embolic stroke ([address] Acute embolic stroke ([address]       ICD-10: Treatment Diagnosis:   · Difficulty in walking, Not elsewhere classified (R26.2)  · Hemiplegia and hemiparesis following cerebral infarction affecting left non-dominant side (H77.297)   Precaution/Allergies:  Patient has no known allergies.       ASSESSMENT:      Osmar Davey is a 53year old admitted R MCA CVA with left hemiparesis. Patient was supine upon contact and agreeable to PT. Patient performs supine to sit with min assist and cues for technique. Patient sits EOB with good static sitting balance. Patient reports he needs to use the bathroom. Patient transfers to standing with min assist and performs transfer to [facility] with min assist, luke walker, and cues for technique. Patient unable to have bowel movement. Patient transfers to standing where he participates in gait training x 15' then 27' with use of rolling walker, min assist, chair follow, and below cues in gait section. Patient returns to room where he wanted to attempt bowel movement again. Patient successful second attempt then demonstrates fair static standing balance while therapist cleaned him up. Patient returns to EOB and to supine with SBA and cues for compensatory strategies. Overall good progress towards physical therapy goals. Goals listed above are still appropriate. Will continue efforts as patient is still below functional baseline. This section established at most recent assessment   PROBLEM LIST (Impairments causing functional limitations):  1. Decreased Strength  2. Decreased ADL/Functional Activities  3. Decreased Transfer Abilities  4. Decreased Ambulation Ability/Technique  5. Decreased Balance  6. Increased Pain  7. Decreased Activity Tolerance  8. Increased Fatigue  9. Decreased Flexibility/Joint Mobility   INTERVENTIONS PLANNED: (Benefits and precautions of physical therapy have been discussed with the patient.)  1. Balance Exercise  2. Bed Mobility  3. Family Education  4. Gait Training  5. Home Exercise Program (HEP)  6. Manual Therapy  7. Neuromuscular Re-education/Strengthening  8. Range of Motion (ROM)  9. Therapeutic Activites  10. Therapeutic Exercise/Strengthening  11.  Transfer Training     TREATMENT PLAN: Frequency/Duration: 3 times a week for duration of hospital stay  Rehabilitation Potential For Stated Goals: Good     REHAB RECOMMENDATIONS (at time of discharge pending progress):    Placement: It is my opinion, based on this patient's performance to date, that Mr. Osmar Davey may benefit from intensive therapy at an [address] [place] [address] after discharge due to a probable need for close medical supervision by a rehab physician, a probable need for multiple therapy disciplines and potential to make ongoing and sustainable functional improvement that is of practical value. .  Equipment:    TBD pending progress with therapy. HISTORY:   History of Present Injury/Illness (Reason for Referral):  Per H&P: \"Pt is a 52 y/o smoker with DM, HTN, who presented to ER with L leg and arm weakness, L facial droop, dysarthria. First noted L leg weakness late night 12/11 when he woke up to go to the bathroom. He was normal when he went to bed around 1030. Woke up this morning and had persistent weakness L leg and also now noted in L arm. EMS called. Noted to have slurred speech and L facial droop as well. Code S called in ER around 9am.  MRI with acute infarcts in L cerebellar hemisphere, deep frontoparietal white matter, and R paramedian yariel. Also noted old lacunar infarcts. No large vessel occlusion on CTA, but some stenosis noted. No hx afib, TIA, CVA. No CP, palpitations, SOB. \"  Past Medical History/Comorbidities:   Mr. Osmar Davey  has a past medical history of Acute ischemic stroke ([address] (12/12/2019), Acute pancreatitis (11/19/2014), Cerebrovascular accident (CVA) due to embolism ([address] (12/12/2019), Diabetes ([address] (2002), Diabetes ([address] Diabetes mellitus, and Hypertension.  He also has no past medical history of Arthritis, Asthma, Autoimmune disease ([address] CAD (coronary artery disease), Cancer ([address] Chronic kidney disease, COPD, Dementia, Dementia ([address] Heart failure ([address] Ill-defined condition, Infectious disease, Liver disease, Other ill-defined conditions(799.89), Psychiatric disorder, PUD (peptic ulcer disease), Seizures ([facility] or Sleep disorder. Mr. Davey Stein  has a past surgical history that includes hx hernia repair and hx orthopaedic. Social History/Living Environment:   Home Environment: Apartment  # Steps to Enter: 12  Rails to Enter: Yes  [employer] : Bilateral  One/Two Story Residence: One story  Living Alone: No  Support Systems: Spouse/Significant Other/Partner  Patient Expects to be Discharged to[de-identified] Unknown  Current DME Used/Available at Home: None  Tub or Shower Type: Tub/Shower combination  Prior Level of Function/Work/Activity:  Independent, lives with wife in 2nd story 1 level apartment. No recent falls. Number of Personal Factors/Comorbidities that affect the Plan of Care: 1-2: MODERATE COMPLEXITY   EXAMINATION:   Most Recent Physical Functioning:   Gross Assessment:                  Posture:     Balance:  Sitting: Impaired  Sitting - Static: Good (unsupported)  Sitting - Dynamic: Fair (occasional)  Standing: Impaired  Standing - Static: Fair;Constant support  Standing - Dynamic : Poor Bed Mobility:  Supine to Sit: Minimum assistance  Sit to Supine: Stand-by assistance  Wheelchair Mobility:     Transfers:  Sit to Stand: Minimum assistance  Stand to Sit: Minimum assistance  Gait:     Base of Support: Center of gravity altered;Shift to right  Speed/Clotilde: Slow  Gait Abnormalities: Hemiplegic; Step to gait; Decreased step clearance  Distance (ft): (15' then 30')  Assistive Device: Walker luke;Gait belt  Ambulation - Level of Assistance: Moderate assistance  Interventions: Safety awareness training; Tactile cues; Verbal cues;Manual cues      Body Structures Involved:  1. Nerves  2. Voice/Speech  3. Bones  4. Joints  5. Muscles Body Functions Affected:  1. Mental  2. Sensory/Pain  3. Neuromusculoskeletal  4. Movement Related Activities and Participation Affected:  1. General Tasks and Demands  2. Mobility  3. Self Care  4.  Interpersonal Interactions and Relationships   Number of elements that affect the Plan of Care: 4+: HIGH COMPLEXITY   CLINICAL PRESENTATION:   Presentation: Evolving clinical presentation with changing clinical characteristics: MODERATE COMPLEXITY   CLINICAL DECISION MAKIN [facility] Inpatient Short Form  How much difficulty does the patient currently have. .. Unable A Lot A Little None   1. Turning over in bed (including adjusting bedclothes, sheets and blankets)? [] 1   [] 2   [x] 3   [] 4   2. Sitting down on and standing up from a chair with arms ( e.g., wheelchair, bedside commode, etc.)   [] 1   [x] 2   [] 3   [] 4   3. Moving from lying on back to sitting on the side of the bed? [] 1   [] 2   [x] 3   [] 4   How much help from another person does the patient currently need. .. Total A Lot A Little None   4. Moving to and from a bed to a chair (including a wheelchair)? [] 1   [x] 2   [] 3   [] 4   5. Need to walk in hospital room? [] 1   [x] 2   [] 3   [] 4   6. Climbing 3-5 steps with a railing? [] 1   [x] 2   [] 3   [] 4   © , Trustees of [address] [place] [address], under license to [employer]. All rights reserved      Score:  Initial: 13 Most Recent: 14 (Date:2020)    Interpretation of Tool:  Represents activities that are increasingly more difficult (i.e. Bed mobility, Transfers, Gait). Medical Necessity:     · Patient is expected to demonstrate progress in   · strength, range of motion, balance, coordination, and functional technique  ·  to   · increase independence with all mobility. · .  Reason for Services/Other Comments:  · Patient continues to require skilled intervention due to   · medical complications and mobility deficits which impact his level of function, safety, and independence as indicated above.    · .   Use of outcome tool(s) and clinical judgement create a POC that gives a: Questionable prediction of patient's progress: MODERATE COMPLEXITY        TREATMENT:      Pre-treatment Symptoms/Complaints:  none  Pain: Initial: None Post Session:  None     Gait Training (  25 Minutes):  Gait training to improve and/or restore physical functioning as related to mobility, strength and balance. Ambulated (15' then 27') with Moderate assistance using a Walker luke;Gait belt and moderate Safety awareness training; Tactile cues; Verbal cues;Manual cues related to their sequencing, knee positioning, foot placement, step length, weight shifts, luke walker placement, and posture to promote proper body alignment, promote proper body posture and promote proper body mechanics. Instruction in performance of the above deficits to correct overall gait sequencing and quality. Therapeutic Activity: (    15 Minutes): Therapeutic activities including bed mobility training, transfers from various surfaces, static/dynamic sitting/standing balance, posture training, scooting, and patient education to improve mobility, strength and balance. Required moderate Safety awareness training; Tactile cues; Verbal cues;Manual cues to promote static and dynamic balance in standing and promote coordination of bilateral, lower extremity(s). Braces/Orthotics/Lines/Etc:   · Sling to L UE throughout transfers and ambulation  Treatment/Session Assessment:    · Response to Treatment:  See above  · Interdisciplinary Collaboration:   o Physical Therapy Assistant  o Registered Nurse  o Rehabilitation Attendant  · After treatment position/precautions:   o Supine in bed  o Bed alarm/tab alert on  o Bed/Chair-wheels locked  o Bed in low position  o Call light within reach  o RN notified   · Compliance with Program/Exercises: Compliant all of the time  · Recommendations/Intent for next treatment session: \"Next visit will focus on advancements to more challenging activities and reduction in assistance provided\".     Total Treatment Duration:  PT Patient Time In/Time Out  Time In: 6974  Time Out: [facility] [place]

## 2020-02-17 NOTE — INTERDISCIPLINARY ROUNDS
Interdisciplinary team rounds were held 2/17/2020 with the following team members:Care Management, Nursing and Occupational Therapy and the patient. Plan of care discussed. See clinical pathway and/or care plan for interventions and desired outcomes.

## 2020-02-17 NOTE — PROGRESS NOTES
Hospitalist Progress Note     Admit Date:  2019  9:07 AM   Name:  Dio Pavon   Age:  53 y.o.  :  1975   MRN:  [id number]   PCP:  Constantine Rocha MD  Treatment Team: Attending Provider: Fabiola Masters MD; Care Manager: Elvie Marie, RN; Care Manager: Mavis Torre LMSW; Utilization Review: Bonnie Ramos RN; Nurse Practitioner: Doris Humphrey NP; Physical Therapy Assistant: Al Dugan PTA    Subjective: This is a 54 yo male with PMH of DM II, HTN who presented 19 with c/o left leg and arm weakness, left facial droop and dysarthria.  Code S called. MRI with acute infarctions in left cerebellar hemisphere, deep frontoparietal white matter and right paramedian yariel.  Also noted to have old lacunar infarcts. CTA without large vessel occlusions, however some stenosis noted.  Echo showed PFO, Cardiology to f/u outpatient for evaluation for closure.  Neurology consulted.  Started on ASA, statin.  Pt is uninsured, difficult placement, case management working on plan.      patient seen and examined at bedside. No overnight events. No complaints    Nursing notes and chart reviewed. Review of Systems negative with exception of pertinent positives noted above. Objective:     Patient Vitals for the past 24 hrs:   Temp Pulse Resp BP SpO2   20 1200 97.8 °F (36.6 °C) 74 17 128/88 95 %   20 0800 97.5 °F (36.4 °C) 70 16 126/84 94 %   20 0400 97.7 °F (36.5 °C) 73 16 116/82 94 %   20 0000 98.2 °F (36.8 °C) 77 16 114/76 95 %   20 2000 98.2 °F (36.8 °C) 62 16 127/84 95 %   20 1600 98.6 °F (37 °C) 67 20 121/76 95 %     Oxygen Therapy  O2 Sat (%): 95 % (20 1200)  Pulse via Oximetry: 66 beats per minute (20 0000)  O2 Device: Room air (20 0000)  No intake or output data in the 24 hours ending 20 1358      General:    Well nourished. Alert. LUE still in slign  CV:   RRR. No murmur, rub, or gallop.   Lungs: CTAB.  No wheezing, rhonchi, or rales. Abdomen:   Soft, nontender, nondistended. Extremities: Warm and dry. No cyanosis or edema. Skin:     No rashes or jaundice. Data Review:  I have reviewed all labs, meds, telemetry events, and studies from the last 24 hours. Recent Results (from the past 24 hour(s))   GLUCOSE, POC    Collection Time: 02/17/20  7:16 AM   Result Value Ref Range    Glucose (POC) 107 (H) 65 - 100 mg/dL   GLUCOSE, POC    Collection Time: 02/17/20 11:02 AM   Result Value Ref Range    Glucose (POC) 152 (H) 65 - 100 mg/dL        All Micro Results     None          Current Meds:  Current Facility-Administered Medications   Medication Dose Route Frequency    lip protectant (BLISTEX) ointment 1 Each  1 Each Topical PRN    acetaminophen (TYLENOL) tablet 650 mg  650 mg Oral Q6H    metFORMIN (GLUCOPHAGE) tablet 500 mg  500 mg Oral BID WITH MEALS    metoprolol succinate (TOPROL-XL) XL tablet 25 mg  25 mg Oral DAILY    polyethylene glycol (MIRALAX) packet 17 g  17 g Oral DAILY PRN    guaiFENesin (ROBITUSSIN) 100 mg/5 mL oral liquid 100 mg  100 mg Oral Q4H PRN    hydrALAZINE (APRESOLINE) 20 mg/mL injection 20 mg  20 mg IntraVENous Q4H PRN    atorvastatin (LIPITOR) tablet 80 mg  80 mg Oral QHS    lisinopril (PRINIVIL, ZESTRIL) tablet 40 mg  40 mg Oral DAILY    sodium chloride (NS) flush 5-40 mL  5-40 mL IntraVENous PRN    ondansetron (ZOFRAN) injection 4 mg  4 mg IntraVENous Q4H PRN    aspirin chewable tablet 81 mg  81 mg Oral DAILY    enoxaparin (LOVENOX) injection 40 mg  40 mg SubCUTAneous Q24H    dextrose 40% (GLUTOSE) oral gel 1 Tube  15 g Oral PRN    glucagon (GLUCAGEN) injection 1 mg  1 mg IntraMUSCular PRN    dextrose (D50W) injection syrg 12.5-25 g  25-50 mL IntraVENous PRN       Other Studies (last 24 hours):  No results found.     Assessment and Plan:     Hospital Problems as of 2/17/2020 Date Reviewed: 3/3/2017          Codes Class Noted - Resolved POA    PFO (patent foramen ovale) ICD-10-CM: Q21.1  ICD-9-CM: 745.5  12/17/2019 - Present Yes        Arrhythmia ICD-10-CM: I49.9  ICD-9-CM: 427.9  12/15/2019 - Present Yes        Hyperlipemia ICD-10-CM: E78.5  ICD-9-CM: 272.4  12/15/2019 - Present Yes        * (Principal) Acute embolic stroke [facility]) [id number]-WN: I63.9  ICD-9-CM: 434.11  12/12/2019 - Present Yes        Hypertension (Chronic) ICD-10-CM: I10  ICD-9-CM: 401.9  Unknown - Present Yes        Type 2 diabetes mellitus (HCC) (Chronic) ICD-10-CM: E11.9  ICD-9-CM: 250.00  Unknown - Present Yes              PLAN:    Acute embolic stroke  -is \"outpatient ready\" so no routine labs indicated at this point  -MRI with acute infarcts in L cerebellar hemisphere, deep frontoparietal white matter, and R paramedian yariel. old lacunar infarcts.    -ISMAEL: 3 mm PFO, Cardiology stated he needs an evaluation for closure PFO with high risk features including significant (3 mm) separation of septum secundum and primum as well as large shunt.  Will await recovery of CVA.  Plan is 4 week event monitor as outpatient.  Will then see in offfice and if no arrythmias will plan PFO closure  -statin, ASA     Hypertension:   -lisinopril, metoprolol     Type 2 diabetes mellitus:    -cont current     DISPO: waiting for pt to be deemed \"disabled\" which in neurologic cases have to allow for 6 months showing chronicity.  Then he can get medicaid after that.   DVT ppx:  lovenox    Signed:  Valente Woodward MD

## 2020-02-17 NOTE — PROGRESS NOTES
Problem: Patient Education: Go to Patient Education Activity  Goal: Patient/Family Education  Outcome: Progressing Towards Goal     Problem: Pressure Injury - Risk of  Goal: *Prevention of pressure injury  Description  Document Dewey Scale and appropriate interventions in the flowsheet. Outcome: Progressing Towards Goal  Note: Pressure Injury Interventions:  Sensory Interventions: Assess changes in LOC    Moisture Interventions: Absorbent underpads, Apply protective barrier, creams and emollients, Check for incontinence Q2 hours and as needed, Limit adult briefs, Maintain skin hydration (lotion/cream), Minimize layers, Moisture barrier, Offer toileting Q_hr    Activity Interventions: Increase time out of bed, Pressure redistribution bed/mattress(bed type), PT/OT evaluation    Mobility Interventions: HOB 30 degrees or less, Pressure redistribution bed/mattress (bed type), PT/OT evaluation, Turn and reposition approx. every two hours(pillow and wedges)    Nutrition Interventions: Document food/fluid/supplement intake    Friction and Shear Interventions: Apply protective barrier, creams and emollients, Foam dressings/transparent film/skin sealants, Minimize layers, Lift sheet                Problem: Patient Education: Go to Patient Education Activity  Goal: Patient/Family Education  Outcome: Progressing Towards Goal     Problem: Falls - Risk of  Goal: *Absence of Falls  Description  Document Eddie Babin Fall Risk and appropriate interventions in the flowsheet.   Outcome: Progressing Towards Goal  Note: Fall Risk Interventions:  Mobility Interventions: Bed/chair exit alarm, Communicate number of staff needed for ambulation/transfer, Patient to call before getting OOB, Utilize walker, cane, or other assistive device    Mentation Interventions: Adequate sleep, hydration, pain control, Bed/chair exit alarm    Medication Interventions: Bed/chair exit alarm, Patient to call before getting OOB, Teach patient to arise slowly    Elimination Interventions: Bed/chair exit alarm, Call light in reach, Patient to call for help with toileting needs, Toilet paper/wipes in reach, Toileting schedule/hourly rounds, Urinal in reach    History of Falls Interventions: Bed/chair exit alarm, Door open when patient unattended         Problem: Patient Education: Go to Patient Education Activity  Goal: Patient/Family Education  Outcome: Progressing Towards Goal     Problem: Diabetes Self-Management  Goal: *Disease process and treatment process  Description  Define diabetes and identify own type of diabetes; list 3 options for treating diabetes. Outcome: Progressing Towards Goal  Goal: *Incorporating nutritional management into lifestyle  Description  Describe effect of type, amount and timing of food on blood glucose; list 3 methods for planning meals. Outcome: Progressing Towards Goal  Goal: *Incorporating physical activity into lifestyle  Description  State effect of exercise on blood glucose levels. Outcome: Progressing Towards Goal  Goal: *Developing strategies to promote health/change behavior  Description  Define the ABC's of diabetes; identify appropriate screenings, schedule and personal plan for screenings. Outcome: Progressing Towards Goal  Goal: *Using medications safely  Description  State effect of diabetes medications on diabetes; name diabetes medication taking, action and side effects. Outcome: Progressing Towards Goal  Goal: *Monitoring blood glucose, interpreting and using results  Description  Identify recommended blood glucose targets  and personal targets. Outcome: Progressing Towards Goal  Goal: *Prevention, detection, treatment of acute complications  Description  List symptoms of hyper- and hypoglycemia; describe how to treat low blood sugar and actions for lowering  high blood glucose level.   Outcome: Progressing Towards Goal  Goal: *Prevention, detection and treatment of chronic complications  Description  Define the natural course of diabetes and describe the relationship of blood glucose levels to long term complications of diabetes.   Outcome: Progressing Towards Goal  Goal: *Developing strategies to address psychosocial issues  Description  Describe feelings about living with diabetes; identify support needed and support network  Outcome: Progressing Towards Goal     Problem: Patient Education: Go to Patient Education Activity  Goal: Patient/Family Education  Outcome: Progressing Towards Goal     Problem: Nutrition Deficit  Goal: *Optimize nutritional status  Outcome: Progressing Towards Goal     Problem: Patient Education: Go to Patient Education Activity  Goal: Patient/Family Education  Outcome: Progressing Towards Goal     Problem: Patient Education: Go to Patient Education Activity  Goal: Patient/Family Education  Outcome: Progressing Towards Goal     Problem: Patient Education: Go to Patient Education Activity  Goal: Patient/Family Education  Outcome: Progressing Towards Goal     Problem: Pain  Goal: *Control of Pain  Outcome: Progressing Towards Goal     Problem: Patient Education: Go to Patient Education Activity  Goal: Patient/Family Education  Outcome: Progressing Towards Goal

## 2020-02-18 LAB — GLUCOSE BLD STRIP.AUTO-MCNC: 106 MG/DL (ref 65–100)

## 2020-02-18 PROCEDURE — 74011250637 HC RX REV CODE- 250/637: Performed by: HOSPITALIST

## 2020-02-18 PROCEDURE — 74011250637 HC RX REV CODE- 250/637: Performed by: INTERNAL MEDICINE

## 2020-02-18 PROCEDURE — 82962 GLUCOSE BLOOD TEST: CPT

## 2020-02-18 PROCEDURE — 97116 GAIT TRAINING THERAPY: CPT

## 2020-02-18 PROCEDURE — 97112 NEUROMUSCULAR REEDUCATION: CPT

## 2020-02-18 PROCEDURE — 97535 SELF CARE MNGMENT TRAINING: CPT

## 2020-02-18 PROCEDURE — 74011250636 HC RX REV CODE- 250/636: Performed by: INTERNAL MEDICINE

## 2020-02-18 PROCEDURE — 97530 THERAPEUTIC ACTIVITIES: CPT

## 2020-02-18 PROCEDURE — 65270000029 HC RM PRIVATE

## 2020-02-18 RX ADMIN — ACETAMINOPHEN 650 MG: 325 TABLET, FILM COATED ORAL at 05:23

## 2020-02-18 RX ADMIN — ATORVASTATIN CALCIUM 80 MG: 80 TABLET, FILM COATED ORAL at 21:11

## 2020-02-18 RX ADMIN — ACETAMINOPHEN 650 MG: 325 TABLET, FILM COATED ORAL at 22:46

## 2020-02-18 RX ADMIN — METFORMIN HYDROCHLORIDE 500 MG: 500 TABLET, FILM COATED ORAL at 09:09

## 2020-02-18 RX ADMIN — ACETAMINOPHEN 650 MG: 325 TABLET, FILM COATED ORAL at 16:13

## 2020-02-18 RX ADMIN — METOPROLOL SUCCINATE 25 MG: 25 TABLET, FILM COATED, EXTENDED RELEASE ORAL at 09:09

## 2020-02-18 RX ADMIN — ENOXAPARIN SODIUM 40 MG: 40 INJECTION SUBCUTANEOUS at 16:14

## 2020-02-18 RX ADMIN — GUAIFENESIN 100 MG: 100 SOLUTION ORAL at 16:13

## 2020-02-18 RX ADMIN — ASPIRIN 81 MG 81 MG: 81 TABLET ORAL at 09:09

## 2020-02-18 RX ADMIN — METFORMIN HYDROCHLORIDE 500 MG: 500 TABLET, FILM COATED ORAL at 16:13

## 2020-02-18 RX ADMIN — LISINOPRIL 40 MG: 20 TABLET ORAL at 09:09

## 2020-02-18 NOTE — PROGRESS NOTES
SPEECH PATHOLOGY NOTE:      Attempted to see patient, however another therapy at bedside. Will check back at later time/date as schedule permits.        [address] CCC-SLP

## 2020-02-18 NOTE — PROGRESS NOTES
Problem: Self Care Deficits Care Plan (Adult)  Goal: *Acute Goals and Plan of Care (Insert Text)  Description  GOALS UPDATED 1/22/2020:   1. Patient will complete dynamic sitting balance for ADLs with supervision. PROGRESSING 2/9/2020 (Progressing, 2/18/2020)  2. Patient will demonstrate appropriate safety awareness and protection of L UE during bed mobility and functional transfers with minimal cues. (Progressing) (Progressing, 2/18/2020)  3. Patient will complete total body bathing and dressing with moderate assistance and adaptive equipment as needed. ( [place] 2/9/2020 (Progressing, 2/18/2020)  4. Patient will complete weightbearing into the L UE with ADL tasks with minimal assistance to improve ability to use as a functional assist during ADL tasks. (Progressing) (Progressing, 2/18/2020)  5.5. Patient will demonstrate L UE SROM HEP within 7 days. 6. Patient will complete bed mobility with stand by assistance and adaptive equipment as needed in preparation for functional transfers. PROGRESSING 2/9/2020  7. 7. Patient will complete functional transfers with minimal assistance with equipment as needed. (New goal)8. (Goal Met)    Timeframe: 7 visits       Outcome: Progressing Towards Goal     OCCUPATIONAL THERAPY: Daily Note, Re-evaluation and PM    2/18/2020  INPATIENT: OT Visit Days: 1  Payor: MEDICAID PENDING / Plan: Corey Olguin PENDING / Product Type: Medicaid /      NAME/AGE/GENDER: Shahana Mcdaniel is a 53 y.o. male   PRIMARY DIAGNOSIS:  Acute ischemic stroke ([address] [I63.9]  Cerebrovascular accident (CVA) due to embolism ([address] [Q18.2] Acute embolic stroke ([address] Acute embolic stroke ([address]       ICD-10: Treatment Diagnosis:    · Generalized Muscle Weakness (M62.81)  · Other lack of cordination (R27.8)  · Hemiplegia and hemiparesis following cerebral infarction affecting   · left non-dominant side (I69.354)  · Abnormal posture (R29.3)   Precautions/Allergies:    NO PULLING ON LUE   LUE in sling with shoulder joint approximated and supported   Patient has no known allergies. ASSESSMENT:     Mr. Radha Roldan is a 53 y.o. male presents to the hospital with L sided weakness and acute ischemic CVA. MRI revealed acute to subacute L cerebellar and R paramedian yariel infarcts. At baseline pt lives with his wife and is independent. One finger wide subluxation noted in L shoulder (1/22/2020). 2/18/2020 Reassessment completed this date. Pt presents in supine upon arrival. Pt transferred to sitting with min a and additional time. KT applied to L UE; I strip to upper trap mm. L rhomboid mm, & wrap around L shoulder for support & to decrease L shoulder pain. Patient performed L UE weight bearing with weight shift. Patient then stood with minimal assist x 2. While using keanu walker for balance with R UE, patient required total assistance for toileting, and maximal assist with balance while standing with task. Patient is very involved, and he is slowly and steadily progressing towards goals. Cont OT per tx plan. This section established at most recent assessment   PROBLEM LIST (Impairments causing functional limitations):  1. Decreased Strength  2. Decreased ADL/Functional Activities  3. Decreased Transfer Abilities  4. Decreased Ambulation Ability/Technique  5. Decreased Balance  6. Decreased Activity Tolerance  7. Increased Fatigue  8. Decreased Flexibility/Joint Mobility  9. Decreased Knowledge of Precautions  10. Decreased [place] with Home Exercise Program   INTERVENTIONS PLANNED: (Benefits and precautions of occupational therapy have been discussed with the patient.)  1. Activities of daily living training  2. Adaptive equipment training  3. Balance training  4. Clothing management  5. Cognitive training  6. Donning&doffing training  7. Keanu tech training  8. Neuromuscular re-eduation  9. Therapeutic activity  10.  Therapeutic exercise     TREATMENT PLAN: Frequency/Duration: Follow patient 3 times per week to address above goals. Rehabilitation Potential For Stated Goals: Excellent     REHAB RECOMMENDATIONS (at time of discharge pending progress):    Placement: It is my opinion, based on this patient's performance to date, that Mr. Davey Stein may benefit from intensive therapy at an [address] [place] [address] after discharge due to potential to make ongoing and sustainable functional improvement that is of practical value. Milton Zullinger Pt functioning far below independent baseline, demonstrating good improvement and participation. Pt would likely benefit greatly and increase independence from inpatient rehab stay. Equipment:    TBD               OCCUPATIONAL PROFILE AND HISTORY:   History of Present Injury/Illness (Reason for Referral):  See H&P  Past Medical History/Comorbidities:   Mr. Davey Stein  has a past medical history of Acute ischemic stroke ([address] (12/12/2019), Acute pancreatitis (11/19/2014), Cerebrovascular accident (CVA) due to embolism ([address] (12/12/2019), Diabetes ([address] (2002), Diabetes ([address] Diabetes mellitus, and Hypertension. He also has no past medical history of Arthritis, Asthma, Autoimmune disease ([address] CAD (coronary artery disease), Cancer ([address] Chronic kidney disease, COPD, Dementia, Dementia ([address] Heart failure ([address] Ill-defined condition, Infectious disease, Liver disease, Other ill-defined conditions(799.89), Psychiatric disorder, PUD (peptic ulcer disease), Seizures ([address] or Sleep disorder. Mr. Davey Stein  has a past surgical history that includes hx hernia repair and hx orthopaedic.   Social History/Living Environment:   Home Environment: Apartment  # Steps to Enter: 12  Rails to Enter: Yes  [employer] : Bilateral  One/Two Story Residence: One story  Living Alone: No  Support Systems: Spouse/Significant Other/Partner  Patient Expects to be Discharged to[de-identified] Unknown  Current DME Used/Available at Home: None  Tub or Shower Type: Tub/Shower combination  Prior Level of Function/Work/Activity:  Pt lives at home with his wife. Pt is typically independent with ADL/functional mobility. Pt does not drive. Pt was working part-time at [employer]. Personal Factors:          Age:  53 y.o. Past/Current Experience:  CVA with flaccid L side        Other factors that influence how disability is experienced by the patient:  multiple co-morbidities    Number of Personal Factors/Comorbidities that affect the Plan of Care: Extensive review of physical, cognitive, and psychosocial performance (3+):  HIGH COMPLEXITY   ASSESSMENT OF OCCUPATIONAL PERFORMANCE[de-identified]   Activities of Daily Living:   Basic ADLs (From Assessment) Complex ADLs (From Assessment)   Feeding: Setup  Oral Facial Hygiene/Grooming: Moderate assistance  Bathing: Moderate assistance  Upper Body Dressing: Maximum assistance  Lower Body Dressing: Total assistance  Toileting: Total assistance Instrumental ADL  Meal Preparation: Total assistance  Homemaking: Total assistance   Grooming/Bathing/Dressing Activities of Daily Living         Upper Body Bathing  Bathing Assistance: Moderate assistance (minimal for bathing LUE)  Position Performed: Seated edge of bed       Toileting  Toileting Assistance:  Total assistance(dependent)(Assist for all parts standing)  Clothing Management: Total assistance (dependent)(Assist for all parts standing)           Bed/Mat Mobility  Supine to Sit: Stand-by assistance(set up by therapist)  Sit to Stand: Minimum assistance  Stand to Sit: Minimum assistance     Most Recent Physical Functioning:   Gross Assessment:                  Posture:     Balance:  Sitting: Impaired  Sitting - Static: Good (unsupported)  Sitting - Dynamic: Fair (occasional)  Standing: Impaired  Standing - Static: Constant support;Fair(to poor)  Standing - Dynamic : Poor Bed Mobility:  Supine to Sit: Stand-by assistance(set up by therapist)  Wheelchair Mobility:     Transfers:  Sit to Stand: Minimum assistance  Stand to Sit: Minimum assistance            Patient Vitals for the past 6 hrs:   BP BP Patient Position SpO2 Pulse   20 1159 105/72 At rest 95 % 71   20 1600 (!) 138/97 At rest 97 % (!) 59       Mental Status  Neurologic State: Alert  Orientation Level: Oriented X4  Cognition: Follows commands  Perception: Cues to maintain midline in sitting, Cues to maintain midline in standing(pt fatigued this afternoon)  Perseveration: No perseveration noted  Safety/Judgement: Awareness of environment, Fall prevention, Decreased insight into deficits                          Physical Skills Involved:  1. Range of Motion  2. Balance  3. Strength  4. Activity Tolerance  5. Fine Motor Control  6. Gross Motor Control Cognitive Skills Affected (resulting in the inability to perform in a timely and safe manner):  1. Perception  2. Expression Psychosocial Skills Affected:  1. Habits/Routines  2. Environmental Adaptation  3. Social Interaction  4. Emotional Regulation  5. Self-Awareness  6. Awareness of Others  7. Social Roles   Number of elements that affect the Plan of Care: 5+:  HIGH COMPLEXITY   CLINICAL DECISION MAKIN[address] [place] [id number] AM-PAC 6 Clicks   Daily Activity Inpatient Short Form  How much help from another person does the patient currently need. .. Total A Lot A Little None   1. Putting on and taking off regular lower body clothing? [x] 1   [] 2   [] 3   [] 4   2. Bathing (including washing, rinsing, drying)? [] 1   [x] 2   [] 3   [] 4   3. Toileting, which includes using toilet, bedpan or urinal?   [] 1   [x] 2   [] 3   [] 4   4. Putting on and taking off regular upper body clothing? [] 1   [x] 2   [] 3   [] 4   5. Taking care of personal grooming such as brushing teeth? [] 1   [x] 2   [] 3   [] 4   6. Eating meals? [] 1   [] 2   [x] 3   [] 4   © , Trustees of [address] [place] [address] [address] [id number], under license to [facility].  All rights reserved      Score:  Initial: 11 Most Recent: 12 (Date: 2020 )    Interpretation of Tool:  Represents activities that are increasingly more difficult (i.e. Bed mobility, Transfers, Gait). Medical Necessity:     · Patient demonstrates   · good and excellent  ·  rehab potential due to higher previous functional level. Reason for Services/Other Comments:  · Patient continues to require skilled intervention due to   · Decreased independence with ADL/functional transfers that impacts overall quality of life. · .   Use of outcome tool(s) and clinical judgement create a POC that gives a: MODERATE COMPLEXITY         TREATMENT:   (In addition to Assessment/Re-Assessment sessions the following treatments were rendered)     Pre-treatment Symptoms/Complaints:    Pain: Initial:   Pain Intensity 1: 0  Post Session:  0   Neuromuscular Re-education: (15 minutes):  Exercise/activities per grid below to improve balance, coordination, kinesthetic sense, posture and proprioception. Required minimal visual, verbal and manual cues to promote dynamic balance in standing. Self Care: (10 minutes): Procedure(s) (per grid) utilized to improve and/or restore self-care/home management as related to toileting. Required total assist while STANDING using luke walker for balance verbal, manual and physical assistance cueing to facilitate activities of daily living skills and compensatory activities.   .                 Date:  1/27/2020 Date:  2/11/2020 Date:     Activity/Exercise Parameters Parameters Parameters   LUE finger flexion, AROM gravity minimized then AAROM to complete ROM 2x10 reps 2x10 reps    LUE finger extension AAROM 2x10 reps 2x10 reps    LUE wrist extension, AROM gravity minimized then AAROM to complete ROM 1x10 reps 1x10 reps    LUE wrist extension, AROM against gravity  1x10 reps 1x10 reps    LUE wrist flexion AAROM 2x10 reps 1x10 reps    LUE elbow flexion/extension PROM x10 reps 1x10 reps    LUE shoulder flexion PROM x10 reps 1x10 reps         Braces/Orthotics/Lines/Etc:   · LUE sling   · O2 device: Room air  · Treatment/Session Assessment:    · Response to Treatment:  Pt demonstrated good participation. · Interdisciplinary Collaboration:   o Certified Occupational Therapy Assistant  o Registered Nurse  o Rehabilitation Attendant  · After treatment position/precautions:   o Supine in bed  o Bed/Chair-wheels locked  o Bed in low position  o RN notified   · Compliance with Program/Exercises: Compliant all of the time, Will assess as treatment progresses. · Recommendations/Intent for next treatment session: \"Next visit will focus on advancements to more challenging activities and reduction in assistance provided\".   Total Treatment Duration:  OT Patient Time In/Time Out  Time In: 1340  Time Out: FABIAN [address], OT

## 2020-02-18 NOTE — PROGRESS NOTES
Problem: Mobility Impaired (Adult and Pediatric)  Goal: *Acute Goals and Plan of Care  Description  Acute PT Goals (reviewed & updated 2/12/2020)  STG:  (1.) Mr. Annie Sykes will demonstrate good dynamic sitting balance within 3 treatment days. (2) Mr. Annie Sykes will transfer sit to stand with [facility] within 3 treatment days. (3.)Mr. Naomi Patel will transfer from bed to chair and chair to bed via [address] [facility] with MODERATE ASSIST x1 using the least restrictive device within 3 treatment day(s). (4.) Mr. Annie Sykes will ambulate with MODERATE ASSIST for 25+ feet with the least restrictive device within 3 treatment day(s). (5.)Mr. Annie Sykes will tolerate sitting up in chair/wheelchair x4 hours with 0/10 pain within 3 treatment days. LTG:  (1.) Mr. Annie Sykes will perform bed mobility with CONTACT GUARD ASSISTANCE within 7 treatment day(s). (2.) Mr. Annie Sykes will transfer from bed to chair and chair to bed via [facility] with MINIMAL ASSIST using the least restrictive device within 7 treatment day(s). (3.) Mr. Annie Sykes will ambulate with MINIMAL ASSIST for 50+ feet with the least restrictive device within 7 treatment day(s). (4.)Mr. Annie Sykes will demonstrate FAIR STATIC STANDING balance within 7 treatment days. MET GOALS:      PHYSICAL THERAPY: Daily Note and AM 2/18/2020  INPATIENT: PT Visit Days : 4  Payor: MEDICAID PENDING / Plan: Tura Salts PENDING / Product Type: Medicaid /       NAME/AGE/GENDER: Jade Monge is a 53 y.o. male   PRIMARY DIAGNOSIS: Acute ischemic stroke ([address] [I63.9]  Cerebrovascular accident (CVA) due to embolism ([address] [X76.6] Acute embolic stroke ([address] Acute embolic stroke ([address]       ICD-10: Treatment Diagnosis:   · Difficulty in walking, Not elsewhere classified (R26.2)  · Hemiplegia and hemiparesis following cerebral infarction affecting left non-dominant side (U89.443)   Precaution/Allergies:  Patient has no known allergies.       ASSESSMENT:      Render Post-Care Instructions In Note?: yes Duration Of Freeze Thaw-Cycle (Seconds): 0 Papito Ellison is a 53year old admitted R MCA CVA with left hemiparesis. Patient was supine upon contact and agreeable to PT. Patient performs supine to sit with SBA, set up from therapist (positioning LUE across body to help with weight distribution) and cues for technique. Patient sits EOB with good static sitting balance. Patient needed a brief chair prior to gait training. Patient worked on static standing balance x 3 minutes  X 3 reps to allow therapist to clean patient and don new brief. Patient then performs gait training x 25' x 2 with use of rolling walker, min- mod assist, chair follow, and below cues in gait section. Patient returns to room where he was left sitting up in wheel chair. CNA to change bed prior to patient returning to supine. Patient aware and CNA notified. Overall good progress towards physical therapy goals. Goals listed above are still appropriate. Will continue efforts as patient is still below functional baseline. This section established at most recent assessment   PROBLEM LIST (Impairments causing functional limitations):  1. Decreased Strength  2. Decreased ADL/Functional Activities  3. Decreased Transfer Abilities  4. Decreased Ambulation Ability/Technique  5. Decreased Balance  6. Increased Pain  7. Decreased Activity Tolerance  8. Increased Fatigue  9. Decreased Flexibility/Joint Mobility   INTERVENTIONS PLANNED: (Benefits and precautions of physical therapy have been discussed with the patient.)  1. Balance Exercise  2. Bed Mobility  3. Family Education  4. Gait Training  5. Home Exercise Program (HEP)  6. Manual Therapy  7. Neuromuscular Re-education/Strengthening  8. Range of Motion (ROM)  9. Therapeutic Activites  10. Therapeutic Exercise/Strengthening  11.  Transfer Training     TREATMENT PLAN: Frequency/Duration: 3 times a week for duration of hospital stay  Rehabilitation Potential For Stated Goals: Good     REHAB RECOMMENDATIONS (at time of discharge pending progress): Detail Level: Detailed Placement: It is my opinion, based on this patient's performance to date, that Mr. Anjel Gil may benefit from intensive therapy at an [facility] after discharge due to a probable need for close medical supervision by a rehab physician, a probable need for multiple therapy disciplines and potential to make ongoing and sustainable functional improvement that is of practical value. .  Equipment:    TBD pending progress with therapy. HISTORY:   History of Present Injury/Illness (Reason for Referral):  Per H&P: \"Pt is a 54 y/o smoker with DM, HTN, who presented to ER with L leg and arm weakness, L facial droop, dysarthria. First noted L leg weakness late night 12/11 when he woke up to go to the bathroom. He was normal when he went to bed around 1030. Woke up this morning and had persistent weakness L leg and also now noted in L arm. EMS called. Noted to have slurred speech and L facial droop as well. Code S called in ER around 9am.  MRI with acute infarcts in L cerebellar hemisphere, deep frontoparietal white matter, and R paramedian yariel. Also noted old lacunar infarcts. No large vessel occlusion on CTA, but some stenosis noted. No hx afib, TIA, CVA. No CP, palpitations, SOB. \"  Past Medical History/Comorbidities:   Mr. Anjel Gil  has a past medical history of Acute ischemic stroke ([address] (12/12/2019), Acute pancreatitis (11/19/2014), Cerebrovascular accident (CVA) due to embolism ([address] (12/12/2019), Diabetes ([address] (2002), Diabetes ([address] Diabetes mellitus, and Hypertension. He also has no past medical history of Arthritis, Asthma, Autoimmune disease ([address] CAD (coronary artery disease), Cancer ([address] Chronic kidney disease, COPD, Dementia, Dementia ([address] Heart failure ([address] Ill-defined condition, Infectious disease, Liver disease, Other ill-defined conditions(799.89), Psychiatric disorder, PUD (peptic ulcer disease), Seizures ([address] or Sleep disorder.   Mr. Anjel Gil  has a past Post-Care Instructions: I reviewed with the patient in detail post-care instructions. Patient is to wear sunprotection, and avoid picking at any of the treated lesions. Pt may apply Vaseline to crusted or scabbing areas. surgical history that includes hx hernia repair and hx orthopaedic. Social History/Living Environment:   Home Environment: Apartment  # Steps to Enter: 12  Rails to Enter: Yes  [employer] : Bilateral  One/Two Story Residence: One story  Living Alone: No  Support Systems: Spouse/Significant Other/Partner  Patient Expects to be Discharged to[de-identified] Unknown  Current DME Used/Available at Home: None  Tub or Shower Type: Tub/Shower combination  Prior Level of Function/Work/Activity:  Independent, lives with wife in 2nd story 1 level apartment. No recent falls. Number of Personal Factors/Comorbidities that affect the Plan of Care: 1-2: MODERATE COMPLEXITY   EXAMINATION:   Most Recent Physical Functioning:   Gross Assessment:                  Posture:     Balance:  Sitting: Impaired  Sitting - Static: Good (unsupported)  Sitting - Dynamic: Fair (occasional)  Standing: Impaired  Standing - Static: Constant support;Fair(to poor)  Standing - Dynamic : Poor Bed Mobility:  Supine to Sit: Stand-by assistance(set up by therapist)  Wheelchair Mobility:     Transfers:  Sit to Stand: Minimum assistance  Stand to Sit: Minimum assistance  Gait:     Base of Support: Center of gravity altered;Shift to right  Speed/Clotilde: Slow  Gait Abnormalities: Hemiplegic;Decreased step clearance  Distance (ft): (25' x 2)  Assistive Device: Walker luke;Gait belt  Ambulation - Level of Assistance: Minimal assistance; Moderate assistance(+ chair follow)  Interventions: Safety awareness training; Tactile cues; Verbal cues      Body Structures Involved:  1. Nerves  2. Voice/Speech  3. Bones  4. Joints  5. Muscles Body Functions Affected:  1. Mental  2. Sensory/Pain  3. Neuromusculoskeletal  4. Movement Related Activities and Participation Affected:  1. General Tasks and Demands  2. Mobility  3. Self Care  4.  Interpersonal Interactions and Relationships   Number of elements that affect the Plan of Care: 4+: HIGH COMPLEXITY   CLINICAL PRESENTATION: Consent: The patient's consent was obtained including but not limited to risks of crusting, scabbing, blistering, scarring, darker or lighter pigmentary change, recurrence, incomplete removal and infection. Presentation: Evolving clinical presentation with changing clinical characteristics: MODERATE COMPLEXITY   CLINICAL DECISION MAKIN [facility] Mobility Inpatient Short Form  How much difficulty does the patient currently have. .. Unable A Lot A Little None   1. Turning over in bed (including adjusting bedclothes, sheets and blankets)? [] 1   [] 2   [x] 3   [] 4   2. Sitting down on and standing up from a chair with arms ( e.g., wheelchair, bedside commode, etc.)   [] 1   [x] 2   [] 3   [] 4   3. Moving from lying on back to sitting on the side of the bed? [] 1   [] 2   [x] 3   [] 4   How much help from another person does the patient currently need. .. Total A Lot A Little None   4. Moving to and from a bed to a chair (including a wheelchair)? [] 1   [x] 2   [] 3   [] 4   5. Need to walk in hospital room? [] 1   [x] 2   [] 3   [] 4   6. Climbing 3-5 steps with a railing? [] 1   [x] 2   [] 3   [] 4   © 2007, Trustees of [address] [place] [address], under license to [employer]. All rights reserved      Score:  Initial: 13 Most Recent: 14 (Date:2020)    Interpretation of Tool:  Represents activities that are increasingly more difficult (i.e. Bed mobility, Transfers, Gait). Medical Necessity:     · Patient is expected to demonstrate progress in   · strength, range of motion, balance, coordination, and functional technique  ·  to   · increase independence with all mobility. · .  Reason for Services/Other Comments:  · Patient continues to require skilled intervention due to   · medical complications and mobility deficits which impact his level of function, safety, and independence as indicated above.    · .   Use of outcome tool(s) and clinical judgement create a POC that gives a: Questionable prediction of patient's progress: MODERATE COMPLEXITY        TREATMENT:      Pre-treatment Symptoms/Complaints:  none  Pain: Initial: None Post Session:  None     Gait Training ( 15 Minutes):  Gait training to improve and/or restore physical functioning as related to mobility, strength and balance. Ambulated (25' x 2) with Minimal assistance; Moderate assistance(+ chair follow) using a Walker luke;Gait belt and moderate Safety awareness training; Tactile cues; Verbal cues related to their sequencing, knee positioning, foot placement, step length, weight shifts, luke walker placement, and posture to promote proper body alignment, promote proper body posture and promote proper body mechanics. Instruction in performance of the above deficits to correct overall gait sequencing and quality. Therapeutic Activity: (    10 Minutes): Therapeutic activities including bed mobility training, transfers from various surfaces, static/dynamic sitting/standing balance, posture training, scooting, and patient education to improve mobility, strength and balance. Required moderate Safety awareness training; Tactile cues; Verbal cues to promote static and dynamic balance in standing and promote coordination of bilateral, lower extremity(s). Braces/Orthotics/Lines/Etc:   · Sling to L UE throughout transfers and ambulation  Treatment/Session Assessment:    · Response to Treatment:  See above  · Interdisciplinary Collaboration:   o Physical Therapy Assistant  o Registered Nurse  o Rehabilitation Attendant  · After treatment position/precautions:   o Up in chair  o Bed alarm/tab alert on  o Bed/Chair-wheels locked  o Call light within reach  o RN notified   · Compliance with Program/Exercises: Compliant all of the time  · Recommendations/Intent for next treatment session: \"Next visit will focus on advancements to more challenging activities and reduction in assistance provided\".     Total Treatment Duration:  PT Patient Time In/Time Out  Time In: 1030  Time Out: Archie Amador [address] Haritha, TODD

## 2020-02-18 NOTE — PROGRESS NOTES
Hospitalist Progress Note     Admit Date:  2019  9:07 AM   Name:  Britney Burgess   Age:  53 y.o.  :  1975   MRN:  [id number]   PCP:  Justice Dueñas MD  Treatment Team: Attending Provider: Miguel Angel Sol MD; Care Manager: Ascencion Mccauley, RN; Care Manager: Brandon Nuñez LMSW; Utilization Review: Danilo Nath RN; Nurse Practitioner: Jimi Burroughs NP; Charge Nurse: Tae Epstein Occupational Therapist: Lois Martin OT; Physical Therapy Assistant: Meron Morris PTA    Subjective: This is a 54 yo male with PMH of DM II, HTN who presented 17 with c/o left leg and arm weakness, left facial droop and dysarthria.  Code S called. MRI with acute infarctions in left cerebellar hemisphere, deep frontoparietal white matter and right paramedian yariel.  Also noted to have old lacunar infarcts. CTA without large vessel occlusions, however some stenosis noted.  Echo showed PFO, Cardiology to f/u outpatient for evaluation for closure.  Neurology consulted.  Started on ASA, statin.  Pt is uninsured, difficult placement, case management working on plan.       patient seen and examined at bedside. No overnight events. He has no complaints this morning. Nursing notes and chart reviewed. Review of Systems negative with exception of pertinent positives noted above. Objective:     Patient Vitals for the past 24 hrs:   Temp Pulse Resp BP SpO2   20 0400 97.4 °F (36.3 °C) 71 16 118/81 95 %   20 0000 98.1 °F (36.7 °C) 67 16 128/86 94 %   20 2000 98.1 °F (36.7 °C) 70 16 122/68 94 %   20 1600 98 °F (36.7 °C) 70 17 121/80 96 %   20 1200 97.8 °F (36.6 °C) 74 17 128/88 95 %     Oxygen Therapy  O2 Sat (%): 95 % (20 0400)  Pulse via Oximetry: 66 beats per minute (20 0000)  O2 Device: Room air (20 0000)  No intake or output data in the 24 hours ending 20 0838      General:    Well nourished. Alert. LUE still in a sling. Able to move fingers slightly. CV:   RRR. No murmur, rub, or gallop. Lungs:   CTAB. No wheezing, rhonchi, or rales. Abdomen:   Soft, nontender, nondistended. Extremities: Warm and dry. No cyanosis or edema. Skin:     No rashes or jaundice. Data Review:  I have reviewed all labs, meds, telemetry events, and studies from the last 24 hours. Recent Results (from the past 24 hour(s))   GLUCOSE, POC    Collection Time: 02/17/20 11:02 AM   Result Value Ref Range    Glucose (POC) 152 (H) 65 - 100 mg/dL   GLUCOSE, POC    Collection Time: 02/18/20  7:14 AM   Result Value Ref Range    Glucose (POC) 106 (H) 65 - 100 mg/dL        All Micro Results     None          Current Meds:  Current Facility-Administered Medications   Medication Dose Route Frequency    lip protectant (BLISTEX) ointment 1 Each  1 Each Topical PRN    acetaminophen (TYLENOL) tablet 650 mg  650 mg Oral Q6H    metFORMIN (GLUCOPHAGE) tablet 500 mg  500 mg Oral BID WITH MEALS    metoprolol succinate (TOPROL-XL) XL tablet 25 mg  25 mg Oral DAILY    polyethylene glycol (MIRALAX) packet 17 g  17 g Oral DAILY PRN    guaiFENesin (ROBITUSSIN) 100 mg/5 mL oral liquid 100 mg  100 mg Oral Q4H PRN    hydrALAZINE (APRESOLINE) 20 mg/mL injection 20 mg  20 mg IntraVENous Q4H PRN    atorvastatin (LIPITOR) tablet 80 mg  80 mg Oral QHS    lisinopril (PRINIVIL, ZESTRIL) tablet 40 mg  40 mg Oral DAILY    sodium chloride (NS) flush 5-40 mL  5-40 mL IntraVENous PRN    ondansetron (ZOFRAN) injection 4 mg  4 mg IntraVENous Q4H PRN    aspirin chewable tablet 81 mg  81 mg Oral DAILY    enoxaparin (LOVENOX) injection 40 mg  40 mg SubCUTAneous Q24H    dextrose 40% (GLUTOSE) oral gel 1 Tube  15 g Oral PRN    glucagon (GLUCAGEN) injection 1 mg  1 mg IntraMUSCular PRN    dextrose (D50W) injection syrg 12.5-25 g  25-50 mL IntraVENous PRN       Other Studies (last 24 hours):  No results found.     Assessment and Plan:     Hospital Problems as of 2/18/2020 Date Reviewed: 3/3/2017          Codes Class Noted - Resolved POA    PFO (patent foramen ovale) ICD-10-CM: Q21.1  ICD-9-CM: 745.5  12/17/2019 - Present Yes        Arrhythmia ICD-10-CM: I49.9  ICD-9-CM: 427.9  12/15/2019 - Present Yes        Hyperlipemia ICD-10-CM: E78.5  ICD-9-CM: 272.4  12/15/2019 - Present Yes        * (Principal) Acute embolic stroke [facility]) [id number]: I63.9  ICD-9-CM: 434.11  12/12/2019 - Present Yes        Hypertension (Chronic) ICD-10-CM: I10  ICD-9-CM: 401.9  Unknown - Present Yes        Type 2 diabetes mellitus (HCC) (Chronic) ICD-10-CM: E11.9  ICD-9-CM: 250.00  Unknown - Present Yes              PLAN:    Acute embolic stroke  -is \"outpatient ready\" so no routine labs indicated at this point  -MRI with acute infarcts in L cerebellar hemisphere, deep frontoparietal white matter, and R paramedian yariel. old lacunar infarcts.    -ISMAEL: 3 mm PFO, Cardiology stated he needs an evaluation for closure PFO with high risk features including significant (3 mm) separation of septum secundum and primum as well as large shunt.  Will await recovery of CVA.  Plan is 4 week event monitor as outpatient.  Will then see in office and if no arrhythmias, will plan PFO closure  -continue statin, ASA     Hypertension:   -lisinopril, metoprolol     Type 2 diabetes mellitus:    -cont current     DISPO: waiting for pt to be deemed \"disabled\" which in neurologic cases have to allow for 6 months showing chronicity.  Then he can get medicaid after that.   DVT ppx:  lovenox        Signed:  Greer Paredes MD

## 2020-02-18 NOTE — PROGRESS NOTES
Problem: Pressure Injury - Risk of  Goal: *Prevention of pressure injury  Description  Document Dewey Scale and appropriate interventions in the flowsheet. Outcome: Progressing Towards Goal  Note: Pressure Injury Interventions:  Sensory Interventions: Assess changes in LOC    Moisture Interventions: Absorbent underpads, Apply protective barrier, creams and emollients, Limit adult briefs    Activity Interventions: Increase time out of bed, Pressure redistribution bed/mattress(bed type), PT/OT evaluation    Mobility Interventions: HOB 30 degrees or less, Pressure redistribution bed/mattress (bed type), PT/OT evaluation    Nutrition Interventions: Document food/fluid/supplement intake    Friction and Shear Interventions: Apply protective barrier, creams and emollients, Foam dressings/transparent film/skin sealants                Problem: Patient Education: Go to Patient Education Activity  Goal: Patient/Family Education  Outcome: Progressing Towards Goal     Problem: Falls - Risk of  Goal: *Absence of Falls  Description  Document Jeanne Fall Risk and appropriate interventions in the flowsheet.   Outcome: Progressing Towards Goal  Note: Fall Risk Interventions:  Mobility Interventions: Bed/chair exit alarm, OT consult for ADLs, Patient to call before getting OOB, PT Consult for mobility concerns    Mentation Interventions: Adequate sleep, hydration, pain control, Bed/chair exit alarm    Medication Interventions: Bed/chair exit alarm, Evaluate medications/consider consulting pharmacy, Patient to call before getting OOB, Teach patient to arise slowly    Elimination Interventions: Call light in reach, Bed/chair exit alarm, Patient to call for help with toileting needs, Stay With Me (per policy), Urinal in reach    History of Falls Interventions: Bed/chair exit alarm, Door open when patient unattended, Investigate reason for fall         Problem: Patient Education: Go to Patient Education Activity  Goal: Patient/Family Education  Outcome: Progressing Towards Goal     Problem: Diabetes Self-Management  Goal: *Disease process and treatment process  Description  Define diabetes and identify own type of diabetes; list 3 options for treating diabetes. Outcome: Progressing Towards Goal  Goal: *Incorporating nutritional management into lifestyle  Description  Describe effect of type, amount and timing of food on blood glucose; list 3 methods for planning meals. Outcome: Progressing Towards Goal  Goal: *Incorporating physical activity into lifestyle  Description  State effect of exercise on blood glucose levels. Outcome: Progressing Towards Goal  Goal: *Developing strategies to promote health/change behavior  Description  Define the ABC's of diabetes; identify appropriate screenings, schedule and personal plan for screenings. Outcome: Progressing Towards Goal  Goal: *Using medications safely  Description  State effect of diabetes medications on diabetes; name diabetes medication taking, action and side effects. Outcome: Progressing Towards Goal  Goal: *Monitoring blood glucose, interpreting and using results  Description  Identify recommended blood glucose targets  and personal targets. Outcome: Progressing Towards Goal  Goal: *Prevention, detection, treatment of acute complications  Description  List symptoms of hyper- and hypoglycemia; describe how to treat low blood sugar and actions for lowering  high blood glucose level. Outcome: Progressing Towards Goal  Goal: *Prevention, detection and treatment of chronic complications  Description  Define the natural course of diabetes and describe the relationship of blood glucose levels to long term complications of diabetes.   Outcome: Progressing Towards Goal  Goal: *Developing strategies to address psychosocial issues  Description  Describe feelings about living with diabetes; identify support needed and support network  Outcome: Progressing Towards Goal     Problem: Patient Education: Go to Patient Education Activity  Goal: Patient/Family Education  Outcome: Progressing Towards Goal     Problem: General Medical Care Plan  Goal: *Vital signs within specified parameters  Outcome: Progressing Towards Goal  Goal: *Labs within defined limits  Outcome: Progressing Towards Goal  Goal: *Absence of infection signs and symptoms  Description  Wash hand more often   Outcome: Progressing Towards Goal  Goal: *Optimal pain control at patient's stated goal  Outcome: Progressing Towards Goal  Goal: *Skin integrity maintained  Outcome: Progressing Towards Goal  Goal: *Fluid volume balance  Outcome: Progressing Towards Goal  Goal: *Optimize nutritional status  Outcome: Progressing Towards Goal  Goal: *Anxiety reduced or absent  Outcome: Progressing Towards Goal  Goal: *Progressive mobility and function (eg: ADL's)  Outcome: Progressing Towards Goal     Problem: Patient Education: Go to Patient Education Activity  Goal: Patient/Family Education  Outcome: Progressing Towards Goal     Problem: Pain  Goal: *Control of Pain  Outcome: Progressing Towards Goal     Problem: Patient Education: Go to Patient Education Activity  Goal: Patient/Family Education  Outcome: Progressing Towards Goal

## 2020-02-19 LAB
ANION GAP SERPL CALC-SCNC: 4 MMOL/L (ref 7–16)
BUN SERPL-MCNC: 13 MG/DL (ref 6–23)
CALCIUM SERPL-MCNC: 9.6 MG/DL (ref 8.3–10.4)
CHLORIDE SERPL-SCNC: 110 MMOL/L (ref 98–107)
CO2 SERPL-SCNC: 28 MMOL/L (ref 21–32)
CREAT SERPL-MCNC: 0.98 MG/DL (ref 0.8–1.5)
GLUCOSE SERPL-MCNC: 96 MG/DL (ref 65–100)
HCT VFR BLD AUTO: 36.9 % (ref 41.1–50.3)
HGB BLD-MCNC: 11.5 G/DL (ref 13.6–17.2)
POTASSIUM SERPL-SCNC: 4 MMOL/L (ref 3.5–5.1)
SODIUM SERPL-SCNC: 142 MMOL/L (ref 136–145)

## 2020-02-19 PROCEDURE — 65270000029 HC RM PRIVATE

## 2020-02-19 PROCEDURE — 74011250637 HC RX REV CODE- 250/637: Performed by: HOSPITALIST

## 2020-02-19 PROCEDURE — 92507 TX SP LANG VOICE COMM INDIV: CPT

## 2020-02-19 PROCEDURE — 74011250636 HC RX REV CODE- 250/636: Performed by: INTERNAL MEDICINE

## 2020-02-19 PROCEDURE — 36415 COLL VENOUS BLD VENIPUNCTURE: CPT

## 2020-02-19 PROCEDURE — 74011250637 HC RX REV CODE- 250/637: Performed by: INTERNAL MEDICINE

## 2020-02-19 PROCEDURE — 85018 HEMOGLOBIN: CPT

## 2020-02-19 PROCEDURE — 97112 NEUROMUSCULAR REEDUCATION: CPT

## 2020-02-19 PROCEDURE — 80048 BASIC METABOLIC PNL TOTAL CA: CPT

## 2020-02-19 PROCEDURE — 97530 THERAPEUTIC ACTIVITIES: CPT

## 2020-02-19 RX ADMIN — ENOXAPARIN SODIUM 40 MG: 40 INJECTION SUBCUTANEOUS at 13:50

## 2020-02-19 RX ADMIN — ACETAMINOPHEN 650 MG: 325 TABLET, FILM COATED ORAL at 17:59

## 2020-02-19 RX ADMIN — ACETAMINOPHEN 650 MG: 325 TABLET, FILM COATED ORAL at 13:50

## 2020-02-19 RX ADMIN — LISINOPRIL 40 MG: 20 TABLET ORAL at 08:35

## 2020-02-19 RX ADMIN — ASPIRIN 81 MG 81 MG: 81 TABLET ORAL at 08:35

## 2020-02-19 RX ADMIN — METFORMIN HYDROCHLORIDE 500 MG: 500 TABLET, FILM COATED ORAL at 08:35

## 2020-02-19 RX ADMIN — METOPROLOL SUCCINATE 25 MG: 25 TABLET, FILM COATED, EXTENDED RELEASE ORAL at 08:35

## 2020-02-19 RX ADMIN — METFORMIN HYDROCHLORIDE 500 MG: 500 TABLET, FILM COATED ORAL at 17:59

## 2020-02-19 RX ADMIN — ACETAMINOPHEN 650 MG: 325 TABLET, FILM COATED ORAL at 22:46

## 2020-02-19 RX ADMIN — ACETAMINOPHEN 650 MG: 325 TABLET, FILM COATED ORAL at 06:15

## 2020-02-19 RX ADMIN — ATORVASTATIN CALCIUM 80 MG: 80 TABLET, FILM COATED ORAL at 22:00

## 2020-02-19 RX ADMIN — GUAIFENESIN 100 MG: 100 SOLUTION ORAL at 17:59

## 2020-02-19 NOTE — PROGRESS NOTES
The documentation for this period is being entered following the guidelines as defined in the [facility] downtime policy by Sarah Blackmon. Scheduled downtime 0100 to 0300. Certolizumab And Cimzia (J-Code: ) Price: 0.00 Show Premalignant Destruction Codes?: No Fee Schedule Discount For Cash Pay Patients In % Off Of Fee Schedule: 0 Number Of Lesions Injected: Less than 8 lesions Anticipated Depth And Size Of Soft Tissue Excision (Required): Subcutaneous, Less than 3 cm Anticipated Depth And Size Of Soft Tissue Excision (Required): Subcutaneous, Less than 1.5 cm Location (Required): Extremities J Code Units: 1 Anticipated Depth And Size Of Soft Tissue Excision (Required): Subcutaneous, Less than 2 cm First Biopsy Type: Tangential Biopsy Which Photosensitizer Was Used?: Levulan Fee Schedule Discount In % Off Of Fee Schedule: Standard Fee Schedule as Entered in Pricing Tab First Procedure You Would Like A Quote For?: Benign Excision How Many Lesions Are You Destroying?: Less than 15 Detail Level: Zone Anticipated Excised Diameter (Required): 0.6 - 1.0 cm Repair: Complex Primary Closure Include Prices In The Note?: Yes Anticipated Wound Length (Required): 2.5 cm or less

## 2020-02-19 NOTE — PROGRESS NOTES
Hospitalist Progress Note     Admit Date:  2019  9:07 AM   Name:  Jade Monge   Age:  53 y.o.  :  1975   MRN:  [id number]   PCP:  Yinka Valverde MD  Treatment Team: Attending Provider: Gregorio Stallings MD; Care Manager: Fior Rice, RN; Care Manager: Carolin Daugherty LMSW; Utilization Review: Sayra Montalvo RN; Nurse Practitioner: Fer Joseph NP; Speech Language Pathologist: Dominik Brown, HYACINTH; Charge Nurse: Ashley Olvera; Physical Therapist: Timothy Corbin, PT, DPT    Subjective:   52 yo male with PMH of DM II, HTN who presented 19 with c/o left leg and arm weakness, left facial droop and dysarthria.  Code S called. MRI with acute infarctions in left cerebellar hemisphere, deep frontoparietal white matter and right paramedian yariel.  Also noted to have old lacunar infarcts. CTA without large vessel occlusions, however some stenosis noted.  Echo showed PFO, Cardiology to f/u outpatient for evaluation for closure.  Neurology consulted.  Started on ASA, statin.  Pt is uninsured, difficult placement, case management working on plan.      patient seen and examined at bedside. No overnight events. Has no complaints this morning. Nursing notes and chart reviewed. Review of Systems negative with exception of pertinent positives noted above.       Objective:     Patient Vitals for the past 24 hrs:   Temp Pulse Resp BP SpO2   20 0800 97.5 °F (36.4 °C) 68 18 134/87 98 %   20 0400 98 °F (36.7 °C) 75 18 132/87 96 %   20 0000 98 °F (36.7 °C) 62 18 145/77 97 %   20 2000 98 °F (36.7 °C) 62 18 144/89 96 %   20 1600 98.2 °F (36.8 °C) (!) 59 18 (!) 138/97 97 %   20 1159 98.3 °F (36.8 °C) 71 17 105/72 95 %     Oxygen Therapy  O2 Sat (%): 98 % (20 0800)  Pulse via Oximetry: 66 beats per minute (20 0000)  O2 Device: Room air (20 0000)    Intake/Output Summary (Last 24 hours) at 2020 0826  Last data filed at 2020 1201  Gross per 24 hour   Intake 210 ml   Output    Net 210 ml         General:    Well nourished. Alert. Left upper extremity in a sling  CV:   RRR. No murmur, rub, or gallop. Lungs:   CTAB. No wheezing, rhonchi, or rales. Abdomen:   Soft, nontender, nondistended. Extremities: Warm and dry. No cyanosis or edema. Skin:     No rashes or jaundice. Data Review:  I have reviewed all labs, meds, telemetry events, and studies from the last 24 hours.     Recent Results (from the past 24 hour(s))   METABOLIC PANEL, BASIC    Collection Time: 02/19/20  6:11 AM   Result Value Ref Range    Sodium 142 136 - 145 mmol/L    Potassium 4.0 3.5 - 5.1 mmol/L    Chloride 110 (H) 98 - 107 mmol/L    CO2 28 21 - 32 mmol/L    Anion gap 4 (L) 7 - 16 mmol/L    Glucose 96 65 - 100 mg/dL    BUN 13 6 - 23 MG/DL    Creatinine 0.98 0.8 - 1.5 MG/DL    GFR est AA >60 >60 ml/min/1.73m2    GFR est non-AA >60 >60 ml/min/1.73m2    Calcium 9.6 8.3 - 10.4 MG/DL   HGB & HCT    Collection Time: 02/19/20  6:11 AM   Result Value Ref Range    HGB 11.5 (L) 13.6 - 17.2 g/dL    HCT 36.9 (L) 41.1 - 50.3 %        All Micro Results     None          Current Meds:  Current Facility-Administered Medications   Medication Dose Route Frequency    lip protectant (BLISTEX) ointment 1 Each  1 Each Topical PRN    acetaminophen (TYLENOL) tablet 650 mg  650 mg Oral Q6H    metFORMIN (GLUCOPHAGE) tablet 500 mg  500 mg Oral BID WITH MEALS    metoprolol succinate (TOPROL-XL) XL tablet 25 mg  25 mg Oral DAILY    polyethylene glycol (MIRALAX) packet 17 g  17 g Oral DAILY PRN    guaiFENesin (ROBITUSSIN) 100 mg/5 mL oral liquid 100 mg  100 mg Oral Q4H PRN    hydrALAZINE (APRESOLINE) 20 mg/mL injection 20 mg  20 mg IntraVENous Q4H PRN    atorvastatin (LIPITOR) tablet 80 mg  80 mg Oral QHS    lisinopril (PRINIVIL, ZESTRIL) tablet 40 mg  40 mg Oral DAILY    sodium chloride (NS) flush 5-40 mL  5-40 mL IntraVENous PRN    ondansetron (ZOFRAN) injection 4 mg  4 mg IntraVENous Q4H PRN    aspirin chewable tablet 81 mg  81 mg Oral DAILY    enoxaparin (LOVENOX) injection 40 mg  40 mg SubCUTAneous Q24H    dextrose 40% (GLUTOSE) oral gel 1 Tube  15 g Oral PRN    glucagon (GLUCAGEN) injection 1 mg  1 mg IntraMUSCular PRN    dextrose (D50W) injection syrg 12.5-25 g  25-50 mL IntraVENous PRN       Other Studies (last 24 hours):  No results found. Assessment and Plan:     Hospital Problems as of 2/19/2020 Date Reviewed: 3/3/2017          Codes Class Noted - Resolved POA    PFO (patent foramen ovale) ICD-10-CM: Q21.1  ICD-9-CM: 745.5  12/17/2019 - Present Yes        Arrhythmia ICD-10-CM: I49.9  ICD-9-CM: 427.9  12/15/2019 - Present Yes        Hyperlipemia ICD-10-CM: E78.5  ICD-9-CM: 272.4  12/15/2019 - Present Yes        * (Principal) Acute embolic stroke [facility]) IZZY-98-FF: I63.9  ICD-9-CM: 434.11  12/12/2019 - Present Yes        Hypertension (Chronic) ICD-10-CM: I10  ICD-9-CM: 401.9  Unknown - Present Yes        Type 2 diabetes mellitus (HCC) (Chronic) ICD-10-CM: E11.9  ICD-9-CM: 250.00  Unknown - Present Yes              PLAN:    Acute embolic stroke  -is \"outpatient ready\" so no routine labs indicated at this point  -MRI with acute infarcts in L cerebellar hemisphere, deep frontoparietal white matter, and R paramedian yariel. old lacunar infarcts.    -ISMAEL: 3 mm PFO, Cardiology stated he needs an evaluation for closure PFO with high risk features including significant (3 mm) separation of septum secundum and primum as well as large shunt.  Will await recovery of CVA.  Plan is 4 week event monitor as outpatient.  Will then see in office and if no arrhythmias, will plan PFO closure  -continue statin, ASA     Hypertension:   -lisinopril, metoprolol     Type 2 diabetes mellitus:    -cont current     DISPO: waiting for pt to be deemed \"disabled\" which in neurologic cases have to allow for 6 months showing chronicity. Jenny Sol he can get medicaid after that.   DVT ppx:  lovenox    Signed:  Sophia Mark MD

## 2020-02-19 NOTE — PROGRESS NOTES
Problem: Pressure Injury - Risk of  Goal: *Prevention of pressure injury  Description  Document Dewey Scale and appropriate interventions in the flowsheet. Outcome: Progressing Towards Goal  Note: Pressure Injury Interventions:  Sensory Interventions: Assess changes in LOC    Moisture Interventions: Absorbent underpads, Apply protective barrier, creams and emollients, Check for incontinence Q2 hours and as needed, Limit adult briefs    Activity Interventions: Increase time out of bed, Pressure redistribution bed/mattress(bed type), PT/OT evaluation    Mobility Interventions: Pressure redistribution bed/mattress (bed type), PT/OT evaluation, HOB 30 degrees or less    Nutrition Interventions: Document food/fluid/supplement intake    Friction and Shear Interventions: Apply protective barrier, creams and emollients, Foam dressings/transparent film/skin sealants                Problem: Patient Education: Go to Patient Education Activity  Goal: Patient/Family Education  Outcome: Progressing Towards Goal     Problem: Falls - Risk of  Goal: *Absence of Falls  Description  Document Jeanne Fall Risk and appropriate interventions in the flowsheet.   Outcome: Progressing Towards Goal  Note: Fall Risk Interventions:  Mobility Interventions: Bed/chair exit alarm, OT consult for ADLs, Patient to call before getting OOB, PT Consult for mobility concerns    Mentation Interventions: Adequate sleep, hydration, pain control, Bed/chair exit alarm    Medication Interventions: Patient to call before getting OOB, Evaluate medications/consider consulting pharmacy, Bed/chair exit alarm, Teach patient to arise slowly    Elimination Interventions: Call light in reach, Bed/chair exit alarm, Patient to call for help with toileting needs, Stay With Me (per policy)    History of Falls Interventions: Bed/chair exit alarm, Door open when patient unattended, Investigate reason for fall         Problem: Patient Education: Go to Patient Education Activity  Goal: Patient/Family Education  Outcome: Progressing Towards Goal     Problem: General Medical Care Plan  Goal: *Vital signs within specified parameters  Outcome: Progressing Towards Goal  Goal: *Labs within defined limits  Outcome: Progressing Towards Goal  Goal: *Absence of infection signs and symptoms  Description  Wash hand more often   Outcome: Progressing Towards Goal  Goal: *Optimal pain control at patient's stated goal  Outcome: Progressing Towards Goal  Goal: *Skin integrity maintained  Outcome: Progressing Towards Goal  Goal: *Fluid volume balance  Outcome: Progressing Towards Goal  Goal: *Optimize nutritional status  Outcome: Progressing Towards Goal  Goal: *Anxiety reduced or absent  Outcome: Progressing Towards Goal  Goal: *Progressive mobility and function (eg: ADL's)  Outcome: Progressing Towards Goal     Problem: Patient Education: Go to Patient Education Activity  Goal: Patient/Family Education  Outcome: Progressing Towards Goal     Problem: Ischemic Stroke: Discharge Outcomes  Goal: *Verbalizes anxiety and depression are reduced or absent  Outcome: Progressing Towards Goal  Goal: *Verbalize understanding of risk factor modification(Stroke Metric)  Outcome: Progressing Towards Goal  Goal: *Hemodynamically stable  Outcome: Progressing Towards Goal  Goal: *Absence of aspiration pneumonia  Outcome: Progressing Towards Goal  Goal: *Aware of needed dietary changes  Outcome: Progressing Towards Goal  Goal: *Verbalize understanding of prescribed medications including anti-coagulants, anti-lipid, and/or anti-platelets(Stroke Metric)  Outcome: Progressing Towards Goal  Goal: *Tolerating diet  Outcome: Progressing Towards Goal  Goal: *Aware of follow-up diagnostics related to anticoagulants  Outcome: Progressing Towards Goal  Goal: *Ability to perform ADLs and demonstrates progressive mobility and function  Outcome: Progressing Towards Goal  Goal: *Absence of DVT(Stroke Metric)  Outcome: Progressing Towards Goal  Goal: *Absence of aspiration  Outcome: Progressing Towards Goal  Goal: *Optimal pain control at patient's stated goal  Outcome: Progressing Towards Goal  Goal: *Home safety concerns addressed  Outcome: Progressing Towards Goal  Goal: *Describes available resources and support systems  Outcome: Progressing Towards Goal  Goal: *Verbalizes understanding of activation of EMS(911) for stroke symptoms(Stroke Metric)  Outcome: Progressing Towards Goal  Goal: *Understands and describes signs and symptoms to report to providers(Stroke Metric)  Outcome: Progressing Towards Goal  Goal: *Neurolgocially stable (absence of additional neurological deficits)  Outcome: Progressing Towards Goal  Goal: *Verbalizes importance of follow-up with primary care physician(Stroke Metric)  Outcome: Progressing Towards Goal  Goal: *Smoking cessation discussed,if applicable(Stroke Metric)  Outcome: Progressing Towards Goal  Goal: *Depression screening completed(Stroke Metric)  Outcome: Progressing Towards Goal     Problem: Pain  Goal: *Control of Pain  Outcome: Progressing Towards Goal     Problem: Patient Education: Go to Patient Education Activity  Goal: Patient/Family Education  Outcome: Progressing Towards Goal

## 2020-02-19 NOTE — PROGRESS NOTES
Problem: Self Care Deficits Care Plan (Adult)  Goal: *Acute Goals and Plan of Care (Insert Text)  Description  GOALS UPDATED 1/22/2020:   1. Patient will complete dynamic sitting balance for ADLs with supervision. PROGRESSING 2/9/2020 (Progressing, 2/19/2020)  2. Patient will demonstrate appropriate safety awareness and protection of L UE during bed mobility and functional transfers with minimal cues. (Progressing) (Progressing, 2/19/2020)  3. Patient will complete total body bathing and dressing with moderate assistance and adaptive equipment as needed. ( [address] 2/9/2020 (Progressing, 2/19/2020)  4. Patient will complete weightbearing into the L UE with ADL tasks with minimal assistance to improve ability to use as a functional assist during ADL tasks. (Progressing) (Progressing, 2/19/2020)  5.5. Patient will demonstrate L UE SROM HEP within 7 days. 6. Patient will complete bed mobility with stand by assistance and adaptive equipment as needed in preparation for functional transfers. PROGRESSING 2/9/2020  7. 7. Patient will complete functional transfers with minimal assistance with equipment as needed. (New goal)8. (Goal Met)    Timeframe: 7 visits       Outcome: Progressing Towards Goal     OCCUPATIONAL THERAPY: Daily Note and PM    2/19/2020  INPATIENT: OT Visit Days: 2  Payor: MEDICAID PENDING / Plan: Polly Joseph PENDING / Product Type: Medicaid /      NAME/AGE/GENDER: Jonathon Moss is a 53 y.o. male   PRIMARY DIAGNOSIS:  Acute ischemic stroke ([facility] [I63.9]  Cerebrovascular accident (CVA) due to embolism ([facility] [A58.4] Acute embolic stroke ([address] Acute embolic stroke ([facility]       ICD-10: Treatment Diagnosis:    · Generalized Muscle Weakness (M62.81)  · Other lack of cordination (R27.8)  · Hemiplegia and hemiparesis following cerebral infarction affecting   · left non-dominant side (I69.354)  · Abnormal posture (R29.3)   Precautions/Allergies:    NO PULLING ON LUE   LUE in sling with shoulder joint approximated and supported   Patient has no known allergies. ASSESSMENT:     Mr. Papito Ellison is a 53 y.o. male presents to the hospital with L sided weakness and acute ischemic CVA. MRI revealed acute to subacute L cerebellar and R paramedian yariel infarcts. At baseline pt lives with his wife and is independent. One finger wide subluxation noted in L shoulder (1/22/2020). 2/19/2020 Pt presents in supine upon arrival. Pt agreeable to treatment and transferred to sitting with min/mod a. Attempted with SBA, but pt required more assistance. Pt tolerated WBing and ROM through his LUE. Pt now with more movement in his LUE. Pt with slight shoulder elevation, muscle contraction in his forearm, now able to flex and extend his wrist, and with trace . Last week pt only had shoulder elevation. This is a significant improvement. Pt completed sit to stand with min a and a keanu walker and completed side steps along side of the bed. Pt returned to supine with min/mod a to get legs onto the bed and left with belongings in reach. Good progress. Continue POC. This section established at most recent assessment   PROBLEM LIST (Impairments causing functional limitations):  1. Decreased Strength  2. Decreased ADL/Functional Activities  3. Decreased Transfer Abilities  4. Decreased Ambulation Ability/Technique  5. Decreased Balance  6. Decreased Activity Tolerance  7. Increased Fatigue  8. Decreased Flexibility/Joint Mobility  9. Decreased Knowledge of Precautions  10. Decreased [place] with Home Exercise Program   INTERVENTIONS PLANNED: (Benefits and precautions of occupational therapy have been discussed with the patient.)  1. Activities of daily living training  2. Adaptive equipment training  3. Balance training  4. Clothing management  5. Cognitive training  6. Donning&doffing training  7. Keanu tech training  8. Neuromuscular re-eduation  9. Therapeutic activity  10.  Therapeutic exercise     TREATMENT PLAN: Frequency/Duration: Follow patient 3 times per week to address above goals. Rehabilitation Potential For Stated Goals: Excellent     REHAB RECOMMENDATIONS (at time of discharge pending progress):    Placement: It is my opinion, based on this patient's performance to date, that Mr. Teresa Christine may benefit from intensive therapy at an [address] [place] [address] after discharge due to potential to make ongoing and sustainable functional improvement that is of practical value. Arlene Art Pt functioning far below independent baseline, demonstrating good improvement and participation. Pt would likely benefit greatly and increase independence from inpatient rehab stay. Equipment:    TBD               OCCUPATIONAL PROFILE AND HISTORY:   History of Present Injury/Illness (Reason for Referral):  See H&P  Past Medical History/Comorbidities:   Mr. Teresa Christine  has a past medical history of Acute ischemic stroke ([address] (12/12/2019), Acute pancreatitis (11/19/2014), Cerebrovascular accident (CVA) due to embolism ([address] (12/12/2019), Diabetes ([address] (2002), Diabetes ([address] Diabetes mellitus, and Hypertension. He also has no past medical history of Arthritis, Asthma, Autoimmune disease ([address] CAD (coronary artery disease), Cancer ([address] Chronic kidney disease, COPD, Dementia, Dementia ([address] Heart failure ([address] Ill-defined condition, Infectious disease, Liver disease, Other ill-defined conditions(799.89), Psychiatric disorder, PUD (peptic ulcer disease), Seizures ([address] or Sleep disorder. Mr. Teresa Christine  has a past surgical history that includes hx hernia repair and hx orthopaedic.   Social History/Living Environment:   Home Environment: Apartment  # Steps to Enter: 12  Rails to Enter: Yes  [employer] : Bilateral  One/Two Story Residence: One story  Living Alone: No  Support Systems: Spouse/Significant Other/Partner  Patient Expects to be Discharged to[de-identified] Unknown  Current DME Used/Available at Home: None  Tub or Shower Type: Tub/Shower combination  Prior Level of Function/Work/Activity:  Pt lives at home with his wife. Pt is typically independent with ADL/functional mobility. Pt does not drive. Pt was working part-time at [employer]. Personal Factors:          Age:  53 y.o. Past/Current Experience:  CVA with flaccid L side        Other factors that influence how disability is experienced by the patient:  multiple co-morbidities    Number of Personal Factors/Comorbidities that affect the Plan of Care: Extensive review of physical, cognitive, and psychosocial performance (3+):  HIGH COMPLEXITY   ASSESSMENT OF OCCUPATIONAL PERFORMANCE[de-identified]   Activities of Daily Living:   Basic ADLs (From Assessment) Complex ADLs (From Assessment)   Feeding: Setup  Oral Facial Hygiene/Grooming: Moderate assistance  Bathing: Moderate assistance  Upper Body Dressing: Maximum assistance  Lower Body Dressing: Total assistance  Toileting: Total assistance Instrumental ADL  Meal Preparation: Total assistance  Homemaking: Total assistance   Grooming/Bathing/Dressing Activities of Daily Living         Upper Body Bathing  Bathing Assistance: Moderate assistance (minimal for bathing LUE)  Position Performed: Seated edge of bed                   Bed/Mat Mobility  Supine to Sit: Minimum assistance; Moderate assistance  Sit to Supine: Minimum assistance; Moderate assistance  Sit to Stand: Minimum assistance  Stand to Sit: Minimum assistance     Most Recent Physical Functioning:   Gross Assessment:                  Posture:     Balance:  Sitting: Impaired  Sitting - Static: Good (unsupported)  Sitting - Dynamic: Fair (occasional)  Standing: Impaired  Standing - Static: Constant support; Fair  Standing - Dynamic : Poor Bed Mobility:  Supine to Sit: Minimum assistance; Moderate assistance  Sit to Supine: Minimum assistance; Moderate assistance  Wheelchair Mobility:     Transfers:  Sit to Stand: Minimum assistance  Stand to Sit: Minimum assistance            Patient Vitals for the past 6 hrs:   BP BP Patient Position SpO2 Pulse   20 1200 119/79 At rest 99 % 64   20 1600 121/77 At rest 97 % 67       Mental Status  Neurologic State: Alert  Orientation Level: Oriented X4  Cognition: Follows commands  Perception: Cues to maintain midline in sitting, Cues to maintain midline in standing(pt fatigued this afternoon)  Perseveration: No perseveration noted  Safety/Judgement: Awareness of environment, Fall prevention, Decreased insight into deficits                          Physical Skills Involved:  1. Range of Motion  2. Balance  3. Strength  4. Activity Tolerance  5. Fine Motor Control  6. Gross Motor Control Cognitive Skills Affected (resulting in the inability to perform in a timely and safe manner):  1. Perception  2. Expression Psychosocial Skills Affected:  1. Habits/Routines  2. Environmental Adaptation  3. Social Interaction  4. Emotional Regulation  5. Self-Awareness  6. Awareness of Others  7. Social Roles   Number of elements that affect the Plan of Care: 5+:  HIGH COMPLEXITY   CLINICAL DECISION MAKIN[address] [place] [id number] AM-PAC 6 Clicks   Daily Activity Inpatient Short Form  How much help from another person does the patient currently need. .. Total A Lot A Little None   1. Putting on and taking off regular lower body clothing? [x] 1   [] 2   [] 3   [] 4   2. Bathing (including washing, rinsing, drying)? [] 1   [x] 2   [] 3   [] 4   3. Toileting, which includes using toilet, bedpan or urinal?   [] 1   [x] 2   [] 3   [] 4   4. Putting on and taking off regular upper body clothing? [] 1   [x] 2   [] 3   [] 4   5. Taking care of personal grooming such as brushing teeth? [] 1   [x] 2   [] 3   [] 4   6. Eating meals? [] 1   [] 2   [x] 3   [] 4   © , Trustees of [address] [place] [address] [address] [address], under license to [employer].  All rights reserved      Score:  Initial: 11 Most Recent: 12 (Date: 2020 )    Interpretation of Tool:  Represents activities that are increasingly more difficult (i.e. Bed mobility, Transfers, Gait). Medical Necessity:     · Patient demonstrates   · good and excellent  ·  rehab potential due to higher previous functional level. Reason for Services/Other Comments:  · Patient continues to require skilled intervention due to   · Decreased independence with ADL/functional transfers that impacts overall quality of life. · .   Use of outcome tool(s) and clinical judgement create a POC that gives a: MODERATE COMPLEXITY         TREATMENT:   (In addition to Assessment/Re-Assessment sessions the following treatments were rendered)     Pre-treatment Symptoms/Complaints:    Pain: Initial:   Pain Intensity 1: 0  Pain Location 1: Shoulder  Pain Orientation 1: Left  Pain Intervention(s) 1: Exercise, Repositioned  Post Session:  0   Neuromuscular Re-education: (30 minutes):  Exercise/activities per grid below to improve balance, coordination, kinesthetic sense, posture and proprioception. Required minimal visual, verbal and manual cues to promote dynamic balance in standing. LUE Re-Education  Other Activities: PROM all planes; lateral WBing Trunk Re-Education  Other Activities: weight shifting in standing     Therapeutic Activity: (8 minutes): Therapeutic activities including Bed transfers and static/dynamic standing to improve mobility, strength and balance. Required minimal assist  to promote static and dynamic balance in standing.                       Date:  1/27/2020 Date:  2/11/2020 Date:   2/19/2020   Activity/Exercise Parameters Parameters Parameters   LUE finger flexion, AROM gravity minimized then AAROM to complete ROM 2x10 reps 2x10 reps 3 x's 10 reps   LUE finger extension AAROM 2x10 reps 2x10 reps 3 x's 10 reps   LUE wrist extension, AROM gravity minimized then AAROM to complete ROM 1x10 reps 1x10 reps 3 x's 10 reps   LUE wrist extension, AROM against gravity  1x10 reps 1x10 reps 3 x's 10 reps   LUE wrist flexion AAROM 2x10 reps 1x10 reps 3 x's 10 reps   LUE elbow flexion/extension PROM x10 reps 1x10 reps 3 x's 10 reps   LUE shoulder flexion PROM x10 reps 1x10 reps 3 x's 10 reps        Braces/Orthotics/Lines/Etc:   · LUE sling   · O2 device: Room air  · Treatment/Session Assessment:    · Response to Treatment:  Pt demonstrated good participation. · Interdisciplinary Collaboration:   o Certified Occupational Therapy Assistant  o Registered Nurse  · After treatment position/precautions:   o Supine in bed  o Bed/Chair-wheels locked  o Bed in low position  o RN notified   · Compliance with Program/Exercises: Compliant all of the time, Will assess as treatment progresses. · Recommendations/Intent for next treatment session: \"Next visit will focus on advancements to more challenging activities and reduction in assistance provided\".   Total Treatment Duration:  OT Patient Time In/Time Out  Time In: 1540  Time Out: Lise [address]

## 2020-02-19 NOTE — PROGRESS NOTES
Neurolinguistic Goals:  LTG: Patient will increase neuro-linguistic abilities to increase safety and awareness in functional living environment   STG: Patient will solve mathematical problems with 80%accuracy given minimal cueing   STG: Patient will name 10 items to concrete category in 1 minute given minimal cueing. Progressing 1/31/2020  STG: Patient will participate in verbal reasoning tasks with 80% accuracy given minimal cueing  STG: Patient will complete mental manipulation tasks with 80% accuracy given minimal cueing  STG: Patient will complete working memory/attention tasks with 80% accuracy given minimal cueing  STG: Patient will recall relevant verbal information with 80% accuracy with minimal assistance  STG: Patient will utilize memory compensatory strategies to improve recall of functional list/message with 90%accuracy given minimal assistance  STG: Patient will participate in ongoing cognitive linguistic evaluation for therapeutic assessment. MET 1/31/2020     Dysarthria Goals:  LTG: Patient will develop functional and intelligible speech and utilize compensatory strategies to improve communication across environments  STG: Patient will utilize compensatory strategies at conversation level with 90% accuracy independently. Added 1/31/2020    SPEECH LANGUAGE PATHOLOGY: SPEECH-LANGUAGE/COGNITION: Daily Note 5    NAME/AGE/GENDER: Fernanda Vieyra is a 53 y.o. male  DATE: 2/19/2020  PRIMARY DIAGNOSIS: Acute ischemic stroke ([facility] [I63.9]  Cerebrovascular accident (CVA) due to embolism ([facility] [I63.9]      ICD-10: Treatment Diagnosis: R47.1 Dysarthria and Anarthria  R41.841 Cognitive-Communication Deficit    INTERDISCIPLINARY COLLABORATION: n/a  PRECAUTIONS/ALLERGIES: Patient has no known allergies. SUBJECTIVE   Patient upright in bed for session. Flat affect. Problem List:  (Impairments causing functional limitations):  1. Dysarthria  2.  Cognitive linguistic impaired        Pain: Pain Scale 1: Numeric (0 - 10)  Pain Intensity 1: 0     OBJECTIVE   The completed the following exercises targeting short term memory and compensatory strategies for recall:    Patient recalled details about 6 sentence paragraph presented verbally with 1/5 accurate independently and 3/5 accurate when given multiple choice. On presentation of next factual paragraph, encouraged patient to take notes on important facts to assist with recall. Patient answered questions about details from paragraph with 5/7 accurate with use of notes. During task patient made only 2 short notes. Therapist reread paragraph sentence by sentence and ask patient to write down important details from each sentence with patient noting important details with 5/7 accurate given min to mod cues. Patient presented with mock doctors appointments/tasks with patient correctly writing down critical information with 8/10 accurate independently. ASSESSMENT   Patient continues to have significant difficulty with short term memory. Writing down critical information improves recall; however, patient requires moderate cues to determine what is necessary to write down. Patient currently functioning below baseline. Will continue to follow for cognitive linguistic treatment to improve functional independence/communication. Tool Used: MODIFIED BRITT SCALE (mRS)   Score   No Symptoms  [] 0   No significant disability despite symptoms; able to carry out all usual duties and activities  [] 1   Slight disability; unable to carry out all previous activities but able to look after own affairs without assistance.    [x] 2   Moderate disability; requiring some help but able to walk without assistance  [] 3   Moderately severe disability; unable to walk without assistance and unable to attend to own bodily needs without assistance  [] 4   Severe disability; bedridden, incontinent, and requiring constant nursing care and attention  [] 5      Score:  Initial: 2 Most Recent: 2 (Date 02/11/20 )   Interpretation of Tool: The Modified Luke Scale is a 7-point scaled used to quantify level of disability as it relates to a patient's functional abilities. PLAN    FREQUENCY/DURATION: Continue to follow patient 2 times a week for duration of hospital stay to address above goals. - Recommendations for next treatment session: Next treatment will address cognition   REHABILITATION POTENTIAL FOR STATED GOALS: Good           RECOMMENDATIONS   STRATEGIES:   · Memory strategies  · Dysarthria strategies     EDUCATION:  · Recommendations discussed with Patient and wife     RECOMMENDATIONS for CONTINUED SPEECH THERAPY: YES: Anticipate need for ongoing speech therapy during this hospitalization and at next level of care. Compliance with Program/Exercises: Will assess as treatment progresses  Continuation of Skilled Services/Medical Necessity:   Patient is expected to demonstrate progress in cognitive ability to decrease assistance required communication, increase independence with activities of daily living and increase communication with family/caregivers.  Patient continues to require skilled intervention due to impaired cognitive linguistic abilities.      SAFETY:  After treatment position/precautions:  · Upright in bed  · Call light within reach    Total Treatment Duration:   Time In: 1112  Time Out: [address] [facility] [address], [address] [facility]

## 2020-02-20 PROCEDURE — 74011250637 HC RX REV CODE- 250/637: Performed by: HOSPITALIST

## 2020-02-20 PROCEDURE — 74011250637 HC RX REV CODE- 250/637: Performed by: INTERNAL MEDICINE

## 2020-02-20 PROCEDURE — 97116 GAIT TRAINING THERAPY: CPT

## 2020-02-20 PROCEDURE — 97530 THERAPEUTIC ACTIVITIES: CPT

## 2020-02-20 PROCEDURE — 74011250636 HC RX REV CODE- 250/636: Performed by: INTERNAL MEDICINE

## 2020-02-20 PROCEDURE — 65270000029 HC RM PRIVATE

## 2020-02-20 PROCEDURE — 97112 NEUROMUSCULAR REEDUCATION: CPT

## 2020-02-20 RX ADMIN — ENOXAPARIN SODIUM 40 MG: 40 INJECTION SUBCUTANEOUS at 13:10

## 2020-02-20 RX ADMIN — LISINOPRIL 40 MG: 20 TABLET ORAL at 08:05

## 2020-02-20 RX ADMIN — ACETAMINOPHEN 650 MG: 325 TABLET, FILM COATED ORAL at 15:59

## 2020-02-20 RX ADMIN — METOPROLOL SUCCINATE 25 MG: 25 TABLET, FILM COATED, EXTENDED RELEASE ORAL at 08:05

## 2020-02-20 RX ADMIN — ACETAMINOPHEN 650 MG: 325 TABLET, FILM COATED ORAL at 23:33

## 2020-02-20 RX ADMIN — METFORMIN HYDROCHLORIDE 500 MG: 500 TABLET, FILM COATED ORAL at 16:00

## 2020-02-20 RX ADMIN — METFORMIN HYDROCHLORIDE 500 MG: 500 TABLET, FILM COATED ORAL at 08:05

## 2020-02-20 RX ADMIN — ASPIRIN 81 MG 81 MG: 81 TABLET ORAL at 08:05

## 2020-02-20 RX ADMIN — GUAIFENESIN 100 MG: 100 SOLUTION ORAL at 16:00

## 2020-02-20 RX ADMIN — ACETAMINOPHEN 650 MG: 325 TABLET, FILM COATED ORAL at 10:56

## 2020-02-20 RX ADMIN — ACETAMINOPHEN 650 MG: 325 TABLET, FILM COATED ORAL at 06:07

## 2020-02-20 RX ADMIN — ATORVASTATIN CALCIUM 80 MG: 80 TABLET, FILM COATED ORAL at 23:33

## 2020-02-20 NOTE — PROGRESS NOTES
Problem: Patient Education: Go to Patient Education Activity  Goal: Patient/Family Education  Outcome: Progressing Towards Goal     Problem: Pressure Injury - Risk of  Goal: *Prevention of pressure injury  Description  Document Dewey Scale and appropriate interventions in the flowsheet. Outcome: Progressing Towards Goal  Note: Pressure Injury Interventions:  Sensory Interventions: Assess changes in LOC    Moisture Interventions: Absorbent underpads, Apply protective barrier, creams and emollients, Check for incontinence Q2 hours and as needed, Limit adult briefs, Maintain skin hydration (lotion/cream), Minimize layers, Offer toileting Q_hr, Moisture barrier    Activity Interventions: Increase time out of bed, Pressure redistribution bed/mattress(bed type), PT/OT evaluation    Mobility Interventions: HOB 30 degrees or less, Pressure redistribution bed/mattress (bed type), PT/OT evaluation, Turn and reposition approx. every two hours(pillow and wedges)    Nutrition Interventions: Document food/fluid/supplement intake    Friction and Shear Interventions: Apply protective barrier, creams and emollients, Foam dressings/transparent film/skin sealants                Problem: Patient Education: Go to Patient Education Activity  Goal: Patient/Family Education  Outcome: Progressing Towards Goal     Problem: Falls - Risk of  Goal: *Absence of Falls  Description  Document Alex Almaraz Fall Risk and appropriate interventions in the flowsheet.   Outcome: Progressing Towards Goal  Note: Fall Risk Interventions:  Mobility Interventions: Bed/chair exit alarm, Communicate number of staff needed for ambulation/transfer, Patient to call before getting OOB, Utilize walker, cane, or other assistive device    Mentation Interventions: Adequate sleep, hydration, pain control, Bed/chair exit alarm    Medication Interventions: Bed/chair exit alarm, Patient to call before getting OOB, Teach patient to arise slowly    Elimination Interventions: Bed/chair exit alarm, Call light in reach, Patient to call for help with toileting needs, Toilet paper/wipes in reach, Toileting schedule/hourly rounds    History of Falls Interventions: Bed/chair exit alarm, Door open when patient unattended         Problem: Patient Education: Go to Patient Education Activity  Goal: Patient/Family Education  Outcome: Progressing Towards Goal     Problem: Nutrition Deficit  Goal: *Optimize nutritional status  Outcome: Progressing Towards Goal     Problem: General Medical Care Plan  Goal: *Vital signs within specified parameters  Outcome: Progressing Towards Goal  Goal: *Labs within defined limits  Outcome: Progressing Towards Goal  Goal: *Absence of infection signs and symptoms  Description  Wash hand more often   Outcome: Progressing Towards Goal  Goal: *Optimal pain control at patient's stated goal  Outcome: Progressing Towards Goal  Goal: *Skin integrity maintained  Outcome: Progressing Towards Goal  Goal: *Fluid volume balance  Outcome: Progressing Towards Goal  Goal: *Optimize nutritional status  Outcome: Progressing Towards Goal  Goal: *Anxiety reduced or absent  Outcome: Progressing Towards Goal  Goal: *Progressive mobility and function (eg: ADL's)  Outcome: Progressing Towards Goal     Problem: Patient Education: Go to Patient Education Activity  Goal: Patient/Family Education  Outcome: Progressing Towards Goal     Problem: Patient Education: Go to Patient Education Activity  Goal: Patient/Family Education  Outcome: Progressing Towards Goal     Problem: Patient Education: Go to Patient Education Activity  Goal: Patient/Family Education  Outcome: Progressing Towards Goal     Problem: Patient Education: Go to Patient Education Activity  Goal: Patient/Family Education  Outcome: Progressing Towards Goal     Problem: Pain  Goal: *Control of Pain  Outcome: Progressing Towards Goal     Problem: Patient Education: Go to Patient Education Activity  Goal: Patient/Family Education  Outcome: Progressing Towards Goal

## 2020-02-20 NOTE — PROGRESS NOTES
Problem: Self Care Deficits Care Plan (Adult)  Goal: *Acute Goals and Plan of Care (Insert Text)  Description  GOALS UPDATED 1/22/2020:   1. Patient will complete dynamic sitting balance for ADLs with supervision. PROGRESSING 2/9/2020 (Progressing, 2/20/2020)  2. Patient will demonstrate appropriate safety awareness and protection of L UE during bed mobility and functional transfers with minimal cues. (Progressing) (Progressing, 2/20/2020)  3. Patient will complete total body bathing and dressing with moderate assistance and adaptive equipment as needed. ( [place] 2/9/2020 (Progressing, 2/20/2020)  4. Patient will complete weightbearing into the L UE with ADL tasks with minimal assistance to improve ability to use as a functional assist during ADL tasks. (Progressing) (Progressing, 2/20/2020)  5.5. Patient will demonstrate L UE SROM HEP within 7 days. 6. Patient will complete bed mobility with stand by assistance and adaptive equipment as needed in preparation for functional transfers. PROGRESSING 2/9/2020  7. 7. Patient will complete functional transfers with minimal assistance with equipment as needed. (New goal)8. (Goal Met)    Timeframe: 7 visits       Outcome: Progressing Towards Goal     OCCUPATIONAL THERAPY: Daily Note and PM    2/20/2020  INPATIENT: OT Visit Days: 3  Payor: MEDICAID PENDING / Plan: [place] PENDING / Product Type: Medicaid /      NAME/AGE/GENDER: Hazel Waldron is a 53 y.o. male   PRIMARY DIAGNOSIS:  Acute ischemic stroke ([address] [I63.9]  Cerebrovascular accident (CVA) due to embolism ([address] [I26.5] Acute embolic stroke ([address] Acute embolic stroke ([address]       ICD-10: Treatment Diagnosis:    · Generalized Muscle Weakness (M62.81)  · Other lack of cordination (R27.8)  · Hemiplegia and hemiparesis following cerebral infarction affecting   · left non-dominant side (I69.354)  · Abnormal posture (R29.3)   Precautions/Allergies:    NO PULLING ON LUE   LUE in sling with shoulder joint approximated and supported   Patient has no known allergies. ASSESSMENT:     Mr. Joanne Beach was in bed and agreeable to tx. Pt was min A for supine to sit. Pt demonstrated digit movement in LUE. Pt had trace movement in biceps and triceps with tapping facilitation. He demonstrated full pronation and trace supination on LUE. Pt was encouraged to keep on working on these movements when therapy wasn't present. Pt stood and worked on lateral weight shifting requiring mod A when shifting to the L. Pt was mod A for sit to stand. Pt was able to scoot laterally with mod A. Pt went supine to sit with min A. Pt would benefit from continued skilled therapy. This section established at most recent assessment   PROBLEM LIST (Impairments causing functional limitations):  1. Decreased Strength  2. Decreased ADL/Functional Activities  3. Decreased Transfer Abilities  4. Decreased Ambulation Ability/Technique  5. Decreased Balance  6. Decreased Activity Tolerance  7. Increased Fatigue  8. Decreased Flexibility/Joint Mobility  9. Decreased Knowledge of Precautions  10. Decreased [place] with Home Exercise Program   INTERVENTIONS PLANNED: (Benefits and precautions of occupational therapy have been discussed with the patient.)  1. Activities of daily living training  2. Adaptive equipment training  3. Balance training  4. Clothing management  5. Cognitive training  6. Donning&doffing training  7. Keanu tech training  8. Neuromuscular re-eduation  9. Therapeutic activity  10. Therapeutic exercise     TREATMENT PLAN: Frequency/Duration: Follow patient 3 times per week to address above goals. Rehabilitation Potential For Stated Goals: Excellent     REHAB RECOMMENDATIONS (at time of discharge pending progress):    Placement:   It is my opinion, based on this patient's performance to date, that Mr. Joanne Beach may benefit from intensive therapy at an [address] [place] [address] after discharge due to potential to make ongoing and sustainable functional improvement that is of practical value. Vira Christian Pt functioning far below independent baseline, demonstrating good improvement and participation. Pt would likely benefit greatly and increase independence from inpatient rehab stay. Equipment:    TBD               OCCUPATIONAL PROFILE AND HISTORY:   History of Present Injury/Illness (Reason for Referral):  See H&P  Past Medical History/Comorbidities:   Mr. Kyugn Cisse  has a past medical history of Acute ischemic stroke ([address] (12/12/2019), Acute pancreatitis (11/19/2014), Cerebrovascular accident (CVA) due to embolism ([address] (12/12/2019), Diabetes ([address] (2002), Diabetes ([address] Diabetes mellitus, and Hypertension. He also has no past medical history of Arthritis, Asthma, Autoimmune disease ([address] CAD (coronary artery disease), Cancer ([address] Chronic kidney disease, COPD, Dementia, Dementia ([address] Heart failure ([address] Ill-defined condition, Infectious disease, Liver disease, Other ill-defined conditions(799.89), Psychiatric disorder, PUD (peptic ulcer disease), Seizures ([address] or Sleep disorder. Mr. Kyung Cisse  has a past surgical history that includes hx hernia repair and hx orthopaedic. Social History/Living Environment:   Home Environment: Apartment  # Steps to Enter: 12  Rails to Enter: Yes  [employer] : Bilateral  One/Two Story Residence: One story  Living Alone: No  Support Systems: Spouse/Significant Other/Partner  Patient Expects to be Discharged to[de-identified] Unknown  Current DME Used/Available at Home: None  Tub or Shower Type: Tub/Shower combination  Prior Level of Function/Work/Activity:  Pt lives at home with his wife. Pt is typically independent with ADL/functional mobility. Pt does not drive. Pt was working part-time at [employer]. Personal Factors:          Age:  53 y.o.         Past/Current Experience:  CVA with flaccid L side        Other factors that influence how disability is experienced by the patient:  multiple co-morbidities    Number of Personal Factors/Comorbidities that affect the Plan of Care: Extensive review of physical, cognitive, and psychosocial performance (3+):  HIGH COMPLEXITY   ASSESSMENT OF OCCUPATIONAL PERFORMANCE[de-identified]   Activities of Daily Living:   Basic ADLs (From Assessment) Complex ADLs (From Assessment)   Feeding: Setup  Oral Facial Hygiene/Grooming: Moderate assistance  Bathing: Moderate assistance  Upper Body Dressing: Maximum assistance  Lower Body Dressing: Total assistance  Toileting: Total assistance Instrumental ADL  Meal Preparation: Total assistance  Homemaking: Total assistance   Grooming/Bathing/Dressing Activities of Daily Living         Upper Body Bathing  Bathing Assistance: Moderate assistance (minimal for bathing LUE)  Position Performed: Seated edge of bed                   Bed/Mat Mobility  Supine to Sit: Moderate assistance  Sit to Stand: Minimum assistance  Stand to Sit: Minimum assistance     Most Recent Physical Functioning:   Gross Assessment:                  Posture:     Balance:  Sitting: Impaired  Sitting - Static: Good (unsupported)  Sitting - Dynamic: Fair (occasional)  Standing: Impaired  Standing - Static: Constant support; Fair  Standing - Dynamic : Poor Bed Mobility:  Supine to Sit: Moderate assistance  Wheelchair Mobility:     Transfers:  Sit to Stand: Minimum assistance  Stand to Sit: Minimum assistance            Patient Vitals for the past 6 hrs:   BP BP Patient Position SpO2 Pulse   02/20/20 1222 145/86 At rest 98 % 67       Mental Status  Neurologic State: Alert  Orientation Level: Oriented X4  Cognition: Follows commands  Perception: Cues to maintain midline in sitting, Cues to maintain midline in standing(pt fatigued this afternoon)  Perseveration: No perseveration noted  Safety/Judgement: Awareness of environment, Fall prevention, Decreased insight into deficits                          Physical Skills Involved:  1. Range of Motion  2. Balance  3. Strength  4. Activity Tolerance  5.  Fine Motor Control  6. Gross Motor Control Cognitive Skills Affected (resulting in the inability to perform in a timely and safe manner):  1. Perception  2. Expression Psychosocial Skills Affected:  1. Habits/Routines  2. Environmental Adaptation  3. Social Interaction  4. Emotional Regulation  5. Self-Awareness  6. Awareness of Others  7. Social Roles   Number of elements that affect the Plan of Care: 5+:  HIGH COMPLEXITY   CLINICAL DECISION MAKING:   [employer] AM-PAC 6 Clicks   Daily Activity Inpatient Short Form  How much help from another person does the patient currently need. .. Total A Lot A Little None   1. Putting on and taking off regular lower body clothing? [x] 1   [] 2   [] 3   [] 4   2. Bathing (including washing, rinsing, drying)? [] 1   [x] 2   [] 3   [] 4   3. Toileting, which includes using toilet, bedpan or urinal?   [] 1   [x] 2   [] 3   [] 4   4. Putting on and taking off regular upper body clothing? [] 1   [x] 2   [] 3   [] 4   5. Taking care of personal grooming such as brushing teeth? [] 1   [x] 2   [] 3   [] 4   6. Eating meals? [] 1   [] 2   [x] 3   [] 4   © 2007, Trustees of [facility] [employer], under license to [employer]. All rights reserved      Score:  Initial: 11 Most Recent: 12 (Date: 1/22/2020 )    Interpretation of Tool:  Represents activities that are increasingly more difficult (i.e. Bed mobility, Transfers, Gait). Medical Necessity:     · Patient demonstrates   · good and excellent  ·  rehab potential due to higher previous functional level. Reason for Services/Other Comments:  · Patient continues to require skilled intervention due to   · Decreased independence with ADL/functional transfers that impacts overall quality of life.    · .   Use of outcome tool(s) and clinical judgement create a POC that gives a: MODERATE COMPLEXITY         TREATMENT:   (In addition to Assessment/Re-Assessment sessions the following treatments were rendered)     Pre-treatment Symptoms/Complaints:    Pain: Initial:      Post Session:  0   Neuromuscular Re-education: (12 minutes):  Exercise/activities per grid below to improve balance, coordination, kinesthetic sense, posture and proprioception. Required minimal visual, verbal and manual cues to promote dynamic balance in standing. Date:  1/27/2020 Date:  2/11/2020 Date:   2/19/2020   Activity/Exercise Parameters Parameters Parameters   LUE finger flexion, AROM gravity minimized then AAROM to complete ROM 2x10 reps 2x10 reps 3 x's 10 reps   LUE finger extension AAROM 2x10 reps 2x10 reps 3 x's 10 reps   LUE wrist extension, AROM gravity minimized then AAROM to complete ROM 1x10 reps 1x10 reps 3 x's 10 reps   LUE wrist extension, AROM against gravity  1x10 reps 1x10 reps 3 x's 10 reps   LUE wrist flexion AAROM 2x10 reps 1x10 reps 3 x's 10 reps   LUE elbow flexion/extension PROM x10 reps 1x10 reps 3 x's 10 reps   LUE shoulder flexion PROM x10 reps 1x10 reps 3 x's 10 reps        Braces/Orthotics/Lines/Etc:   · LUE sling   · O2 device: Room air  · Treatment/Session Assessment:    · Response to Treatment:  Pt demonstrated good participation. · Interdisciplinary Collaboration:   o Registered Nurse  · After treatment position/precautions:   o Supine in bed  o Bed/Chair-wheels locked  o Bed in low position   · Compliance with Program/Exercises: Compliant all of the time, Will assess as treatment progresses. · Recommendations/Intent for next treatment session: \"Next visit will focus on advancements to more challenging activities and reduction in assistance provided\".   Total Treatment Duration:  OT Patient Time In/Time Out  Time In: 1338  Time Out: 110 Ricki Harrington OT

## 2020-02-20 NOTE — PROGRESS NOTES
Problem: Mobility Impaired (Adult and Pediatric)  Goal: *Acute Goals and Plan of Care  Description  Acute PT Goals (reviewed & updated 2/12/2020)  STG:  (1.) Mr. Brianna Mcgarry will demonstrate good dynamic sitting balance within 3 treatment days. (2) Mr. Brianna Mcgarry will transfer sit to stand with [facility] within 3 treatment days. (3.)Mr. Camryn Baldwin will transfer from bed to chair and chair to bed via [facility] with MODERATE ASSIST x1 using the least restrictive device within 3 treatment day(s). (4.) Mr. Brianna Mcgarry will ambulate with MODERATE ASSIST for 25+ feet with the least restrictive device within 3 treatment day(s). (5.)Mr. Brianna Mcgarry will tolerate sitting up in chair/wheelchair x4 hours with 0/10 pain within 3 treatment days. LTG:  (1.) Mr. Brianna Mcgarry will perform bed mobility with CONTACT GUARD ASSISTANCE within 7 treatment day(s). (2.) Mr. Brianna Mcgarry will transfer from bed to chair and chair to bed via [facility] with MINIMAL ASSIST using the least restrictive device within 7 treatment day(s). (3.) Mr. Brianna Mcgarry will ambulate with MINIMAL ASSIST for 50+ feet with the least restrictive device within 7 treatment day(s). (4.)Mr. Brianna Mcgarry will demonstrate FAIR STATIC STANDING balance within 7 treatment days. MET GOALS:      PHYSICAL THERAPY: Daily Note and AM 2/20/2020  INPATIENT: PT Visit Days : 5  Payor: MEDICAID PENDING / Plan: [place] PENDING / Product Type: Medicaid /       NAME/AGE/GENDER: Maya Kelley is a 53 y.o. male   PRIMARY DIAGNOSIS: Acute ischemic stroke ([address] [I63.9]  Cerebrovascular accident (CVA) due to embolism ([address] [U34.9] Acute embolic stroke ([address] Acute embolic stroke ([address]       ICD-10: Treatment Diagnosis:   · Difficulty in walking, Not elsewhere classified (R26.2)  · Hemiplegia and hemiparesis following cerebral infarction affecting left non-dominant side (R52.976)   Precaution/Allergies:  Patient has no known allergies.       ASSESSMENT:      Naun Hampton is a 53year old admitted R MCA CVA with left hemiparesis. Patient was supine upon contact and agreeable to PT. Patient performs supine to sit to the left with greater difficulty than going to his right. Patient required mod assist, additional time and cues to perform supine to sit with proper technique. Patient then transfers to standing with min assist and cues for technique. Once standing patient performs gait training x 40' then 27' with use of luke walker, min- mod assist, chair follow, and below cues in gait section. Patient returns to room where he returns to EOB with min-mod assist from therapist and to supine with SBA + cues for compensatory techniques. Overall, slow, steady  progress towards physical therapy goals. Goals listed above are still appropriate. Will continue efforts as patient is still below functional baseline. This section established at most recent assessment   PROBLEM LIST (Impairments causing functional limitations):  1. Decreased Strength  2. Decreased ADL/Functional Activities  3. Decreased Transfer Abilities  4. Decreased Ambulation Ability/Technique  5. Decreased Balance  6. Increased Pain  7. Decreased Activity Tolerance  8. Increased Fatigue  9. Decreased Flexibility/Joint Mobility   INTERVENTIONS PLANNED: (Benefits and precautions of physical therapy have been discussed with the patient.)  1. Balance Exercise  2. Bed Mobility  3. Family Education  4. Gait Training  5. Home Exercise Program (HEP)  6. Manual Therapy  7. Neuromuscular Re-education/Strengthening  8. Range of Motion (ROM)  9. Therapeutic Activites  10. Therapeutic Exercise/Strengthening  11. Transfer Training     TREATMENT PLAN: Frequency/Duration: 3 times a week for duration of hospital stay  Rehabilitation Potential For Stated Goals: Good     REHAB RECOMMENDATIONS (at time of discharge pending progress):    Placement:   It is my opinion, based on this patient's performance to date, that Mr. Naun Hampton may benefit from intensive therapy at an [address] [place] [address] after discharge due to a probable need for close medical supervision by a rehab physician, a probable need for multiple therapy disciplines and potential to make ongoing and sustainable functional improvement that is of practical value. .  Equipment:    TBD pending progress with therapy. HISTORY:   History of Present Injury/Illness (Reason for Referral):  Per H&P: \"Pt is a 54 y/o smoker with DM, HTN, who presented to ER with L leg and arm weakness, L facial droop, dysarthria. First noted L leg weakness late night 12/11 when he woke up to go to the bathroom. He was normal when he went to bed around 1030. Woke up this morning and had persistent weakness L leg and also now noted in L arm. EMS called. Noted to have slurred speech and L facial droop as well. Code S called in ER around 9am.  MRI with acute infarcts in L cerebellar hemisphere, deep frontoparietal white matter, and R paramedian yariel. Also noted old lacunar infarcts. No large vessel occlusion on CTA, but some stenosis noted. No hx afib, TIA, CVA. No CP, palpitations, SOB. \"  Past Medical History/Comorbidities:   Mr. Firoella Urbina  has a past medical history of Acute ischemic stroke ([address] (12/12/2019), Acute pancreatitis (11/19/2014), Cerebrovascular accident (CVA) due to embolism ([address] (12/12/2019), Diabetes ([address] (2002), Diabetes ([address] Diabetes mellitus, and Hypertension. He also has no past medical history of Arthritis, Asthma, Autoimmune disease ([address] CAD (coronary artery disease), Cancer ([address] Chronic kidney disease, COPD, Dementia, Dementia ([address] Heart failure ([address] Ill-defined condition, Infectious disease, Liver disease, Other ill-defined conditions(799.89), Psychiatric disorder, PUD (peptic ulcer disease), Seizures ([address] or Sleep disorder. Mr. Fiorella Urbina  has a past surgical history that includes hx hernia repair and hx orthopaedic.   Social History/Living Environment:   Home Environment: Apartment  # Steps to Enter: 12  Rails to Enter: Yes  Hand Rails : Bilateral  One/Two Story Residence: One story  Living Alone: No  Support Systems: Spouse/Significant Other/Partner  Patient Expects to be Discharged to[de-identified] Unknown  Current DME Used/Available at Home: None  Tub or Shower Type: Tub/Shower combination  Prior Level of Function/Work/Activity:  Independent, lives with wife in 2nd story 1 level apartment. No recent falls. Number of Personal Factors/Comorbidities that affect the Plan of Care: 1-2: MODERATE COMPLEXITY   EXAMINATION:   Most Recent Physical Functioning:   Gross Assessment:                  Posture:     Balance:  Sitting: Impaired  Sitting - Static: Good (unsupported)  Sitting - Dynamic: Fair (occasional)  Standing: Impaired  Standing - Static: Constant support; Fair  Standing - Dynamic : Poor Bed Mobility:  Supine to Sit: Moderate assistance  Wheelchair Mobility:     Transfers:  Sit to Stand: Minimum assistance  Stand to Sit: Minimum assistance  Gait:     Base of Support: Center of gravity altered;Shift to right  Speed/Clotilde: Slow  Step Length: Left shortened;Right shortened  Gait Abnormalities: Hemiplegic;Decreased step clearance; Step to gait  Assistive Device: Gait belt;Walker luke  Ambulation - Level of Assistance: Minimal assistance; Moderate assistance(+ chair follow)  Interventions: Safety awareness training; Tactile cues; Verbal cues;Manual cues      Body Structures Involved:  1. Nerves  2. Voice/Speech  3. Bones  4. Joints  5. Muscles Body Functions Affected:  1. Mental  2. Sensory/Pain  3. Neuromusculoskeletal  4. Movement Related Activities and Participation Affected:  1. General Tasks and Demands  2. Mobility  3. Self Care  4.  Interpersonal Interactions and Relationships   Number of elements that affect the Plan of Care: 4+: HIGH COMPLEXITY   CLINICAL PRESENTATION:   Presentation: Evolving clinical presentation with changing clinical characteristics: MODERATE COMPLEXITY   CLINICAL DECISION MAKIN[address] [place] [address] AM-PAC 6 Clicks   Basic Mobility Inpatient Short Form  How much difficulty does the patient currently have. .. Unable A Lot A Little None   1. Turning over in bed (including adjusting bedclothes, sheets and blankets)? [] 1   [] 2   [x] 3   [] 4   2. Sitting down on and standing up from a chair with arms ( e.g., wheelchair, bedside commode, etc.)   [] 1   [x] 2   [] 3   [] 4   3. Moving from lying on back to sitting on the side of the bed? [] 1   [] 2   [x] 3   [] 4   How much help from another person does the patient currently need. .. Total A Lot A Little None   4. Moving to and from a bed to a chair (including a wheelchair)? [] 1   [x] 2   [] 3   [] 4   5. Need to walk in hospital room? [] 1   [x] 2   [] 3   [] 4   6. Climbing 3-5 steps with a railing? [] 1   [x] 2   [] 3   [] 4   © , Trustees of [address] [place] [address], under license to [employer]. All rights reserved      Score:  Initial: 13 Most Recent: 14 (Date:2020)    Interpretation of Tool:  Represents activities that are increasingly more difficult (i.e. Bed mobility, Transfers, Gait). Medical Necessity:     · Patient is expected to demonstrate progress in   · strength, range of motion, balance, coordination, and functional technique  ·  to   · increase independence with all mobility. · .  Reason for Services/Other Comments:  · Patient continues to require skilled intervention due to   · medical complications and mobility deficits which impact his level of function, safety, and independence as indicated above.    · .   Use of outcome tool(s) and clinical judgement create a POC that gives a: Questionable prediction of patient's progress: MODERATE COMPLEXITY        TREATMENT:      Pre-treatment Symptoms/Complaints:  none  Pain: Initial: None Post Session:  None     Gait Training (  14 Minutes):  Gait training to improve and/or restore physical functioning as related to mobility, strength and balance. Ambulated   with Minimal assistance; Moderate assistance(+ chair follow) using a Gait belt;Walker luke and moderate Safety awareness training; Tactile cues; Verbal cues;Manual cues related to their sequencing, knee positioning, foot placement, step length, weight shifts, luke walker placement, and posture to promote proper body alignment, promote proper body posture and promote proper body mechanics. Instruction in performance of the above deficits to correct overall gait sequencing and quality. Therapeutic Activity: (    10 Minutes): Therapeutic activities including bed mobility training, transfers from various surfaces, static/dynamic sitting/standing balance, posture training, scooting, and patient education to improve mobility, strength and balance. Required moderate Safety awareness training; Tactile cues; Verbal cues;Manual cues to promote static and dynamic balance in standing and promote coordination of bilateral, lower extremity(s). Braces/Orthotics/Lines/Etc:   · Sling to L UE throughout transfers and ambulation  Treatment/Session Assessment:    · Response to Treatment:  See above  · Interdisciplinary Collaboration:   o Physical Therapy Assistant  o Registered Nurse  o Rehabilitation Attendant  · After treatment position/precautions:   o Supine in bed  o Bed alarm/tab alert on  o Bed/Chair-wheels locked  o Bed in low position  o Call light within reach  o RN notified  o Family at bedside   · Compliance with Program/Exercises: Compliant all of the time  · Recommendations/Intent for next treatment session: \"Next visit will focus on advancements to more challenging activities and reduction in assistance provided\".     Total Treatment Duration:  PT Patient Time In/Time Out  Time In: 1021  Time Out: [address], PTA

## 2020-02-20 NOTE — PROGRESS NOTES
Hospitalist Progress Note     Admit Date:  2019  9:07 AM   Name:  Samantha Talavera   Age:  53 y.o.  :  1975   MRN:  [id number]   PCP:  Ramya Wiseman MD  Treatment Team: Attending Provider: Orlando Ramirez MD; Care Manager: Tenzin Berger RN; Care Manager: Desmond Dominguez [facility]; Utilization Review: John Poole RN; Nurse Practitioner: Saul Solitario NP; Charge Nurse: Mayuri Deleon; Physical Therapy Assistant: Lester Wood PTA; Occupational Therapist: Ethel Diaz OT    Subjective:   52 yo male with PMH of DM II, HTN who presented 19 with c/o left leg and arm weakness, left facial droop and dysarthria.  Code S called. MRI with acute infarctions in left cerebellar hemisphere, deep frontoparietal white matter and right paramedian yariel.  Also noted to have old lacunar infarcts. CTA without large vessel occlusions, however some stenosis noted.  Echo showed PFO, Cardiology to f/u outpatient for evaluation for closure.  Neurology consulted.  Started on ASA, statin.  Pt is uninsured, difficult placement, case management working on plan.      patient seen examined at bedside. No overnight events. Doing well without complaints today. Nursing notes and chart reviewed. Review of Systems negative with exception of pertinent positives noted above.       Objective:     Patient Vitals for the past 24 hrs:   Temp Pulse Resp BP SpO2   20 0728 97.8 °F (36.6 °C) 65 18 121/82 98 %   20 0440 97.8 °F (36.6 °C) 72 17 125/86 97 %   20 2254 98 °F (36.7 °C) 74 17 120/85 95 %   20 1951 97.8 °F (36.6 °C) 65 17 129/83 97 %   20 1600 98.1 °F (36.7 °C) 67 16 121/77 97 %   20 1200 98 °F (36.7 °C) 64 18 119/79 99 %     Oxygen Therapy  O2 Sat (%): 98 % (20 0728)  Pulse via Oximetry: 66 beats per minute (20 0000)  O2 Device: Room air (20 0000)  No intake or output data in the 24 hours ending 20 0937      General:    Well nourished. Alert. LUE in sling  CV:   RRR. No murmur, rub, or gallop. Lungs:   CTAB. No wheezing, rhonchi, or rales. Abdomen:   Soft, nontender, nondistended. Extremities: Warm and dry. No cyanosis or edema. Skin:     No rashes or jaundice. Data Review:  I have reviewed all labs, meds, telemetry events, and studies from the last 24 hours. No results found for this or any previous visit (from the past 24 hour(s)). All Micro Results     None          Current Meds:  Current Facility-Administered Medications   Medication Dose Route Frequency    lip protectant (BLISTEX) ointment 1 Each  1 Each Topical PRN    acetaminophen (TYLENOL) tablet 650 mg  650 mg Oral Q6H    metFORMIN (GLUCOPHAGE) tablet 500 mg  500 mg Oral BID WITH MEALS    metoprolol succinate (TOPROL-XL) XL tablet 25 mg  25 mg Oral DAILY    polyethylene glycol (MIRALAX) packet 17 g  17 g Oral DAILY PRN    guaiFENesin (ROBITUSSIN) 100 mg/5 mL oral liquid 100 mg  100 mg Oral Q4H PRN    hydrALAZINE (APRESOLINE) 20 mg/mL injection 20 mg  20 mg IntraVENous Q4H PRN    atorvastatin (LIPITOR) tablet 80 mg  80 mg Oral QHS    lisinopril (PRINIVIL, ZESTRIL) tablet 40 mg  40 mg Oral DAILY    sodium chloride (NS) flush 5-40 mL  5-40 mL IntraVENous PRN    ondansetron (ZOFRAN) injection 4 mg  4 mg IntraVENous Q4H PRN    aspirin chewable tablet 81 mg  81 mg Oral DAILY    enoxaparin (LOVENOX) injection 40 mg  40 mg SubCUTAneous Q24H    dextrose 40% (GLUTOSE) oral gel 1 Tube  15 g Oral PRN    glucagon (GLUCAGEN) injection 1 mg  1 mg IntraMUSCular PRN    dextrose (D50W) injection syrg 12.5-25 g  25-50 mL IntraVENous PRN       Other Studies (last 24 hours):  No results found.     Assessment and Plan:     Hospital Problems as of 2/20/2020 Date Reviewed: 3/3/2017          Codes Class Noted - Resolved POA    PFO (patent foramen ovale) ICD-10-CM: Q21.1  ICD-9-CM: 745.5  12/17/2019 - Present Yes        Arrhythmia ICD-10-CM: I49.9  ICD-9-CM: 427.9 12/15/2019 - Present Yes        Hyperlipemia ICD-10-CM: E78.5  ICD-9-CM: 272.4  12/15/2019 - Present Yes        * (Principal) Acute embolic stroke [facility]) [id number]: I63.9  ICD-9-CM: 434.11  12/12/2019 - Present Yes        Hypertension (Chronic) ICD-10-CM: I10  ICD-9-CM: 401.9  Unknown - Present Yes        Type 2 diabetes mellitus (HCC) (Chronic) ICD-10-CM: E11.9  ICD-9-CM: 250.00  Unknown - Present Yes              PLAN:    Acute embolic stroke  -is \"outpatient ready\" so no routine labs indicated at this point  -MRI with acute infarcts in L cerebellar hemisphere, deep frontoparietal white matter, and R paramedian yariel. old lacunar infarcts.    -ISMAEL: 3 mm PFO, Cardiology stated he needs an evaluation for closure PFO with high risk features including significant (3 mm) separation of septum secundum and primum as well as large shunt.  Will await recovery of CVA.  Plan is 4 week event monitor as outpatient.  Will then see in office and if no arrhythmias, will plan PFO closure  -continue statin, ASA     Hypertension:   -lisinopril, metoprolol     Type 2 diabetes mellitus:    -cont current     DISPO: waiting for pt to be deemed \"disabled\" which in neurologic cases have to allow for 6 months showing chronicity.  Then he can get medicaid after that.   DVT ppx:  lovenox       Signed:  Nikolai Harmon MD

## 2020-02-21 PROCEDURE — 97530 THERAPEUTIC ACTIVITIES: CPT

## 2020-02-21 PROCEDURE — 65270000029 HC RM PRIVATE

## 2020-02-21 PROCEDURE — 97112 NEUROMUSCULAR REEDUCATION: CPT

## 2020-02-21 PROCEDURE — 74011250637 HC RX REV CODE- 250/637: Performed by: INTERNAL MEDICINE

## 2020-02-21 PROCEDURE — 74011250636 HC RX REV CODE- 250/636: Performed by: INTERNAL MEDICINE

## 2020-02-21 PROCEDURE — 74011250637 HC RX REV CODE- 250/637: Performed by: HOSPITALIST

## 2020-02-21 PROCEDURE — 97116 GAIT TRAINING THERAPY: CPT

## 2020-02-21 RX ADMIN — METOPROLOL SUCCINATE 25 MG: 25 TABLET, FILM COATED, EXTENDED RELEASE ORAL at 09:42

## 2020-02-21 RX ADMIN — ACETAMINOPHEN 650 MG: 325 TABLET, FILM COATED ORAL at 23:17

## 2020-02-21 RX ADMIN — ENOXAPARIN SODIUM 40 MG: 40 INJECTION SUBCUTANEOUS at 13:22

## 2020-02-21 RX ADMIN — LISINOPRIL 40 MG: 20 TABLET ORAL at 09:42

## 2020-02-21 RX ADMIN — GUAIFENESIN 100 MG: 100 SOLUTION ORAL at 19:34

## 2020-02-21 RX ADMIN — METFORMIN HYDROCHLORIDE 500 MG: 500 TABLET, FILM COATED ORAL at 08:42

## 2020-02-21 RX ADMIN — ATORVASTATIN CALCIUM 80 MG: 80 TABLET, FILM COATED ORAL at 21:39

## 2020-02-21 RX ADMIN — Medication 5 ML: at 21:39

## 2020-02-21 RX ADMIN — METFORMIN HYDROCHLORIDE 500 MG: 500 TABLET, FILM COATED ORAL at 16:44

## 2020-02-21 RX ADMIN — ACETAMINOPHEN 650 MG: 325 TABLET, FILM COATED ORAL at 13:22

## 2020-02-21 RX ADMIN — ACETAMINOPHEN 650 MG: 325 TABLET, FILM COATED ORAL at 06:26

## 2020-02-21 RX ADMIN — ACETAMINOPHEN 650 MG: 325 TABLET, FILM COATED ORAL at 17:42

## 2020-02-21 RX ADMIN — ASPIRIN 81 MG 81 MG: 81 TABLET ORAL at 09:42

## 2020-02-21 RX ADMIN — POLYETHYLENE GLYCOL 3350 17 G: 17 POWDER, FOR SOLUTION ORAL at 23:17

## 2020-02-21 NOTE — PROGRESS NOTES
SPEECH PATHOLOGY NOTE:    Attempted to see patient for speech therapy this PM. Patient working with OT/PT. Will check back at later time/date.         Warden Giulia JOHNSON, CCC-SLP

## 2020-02-21 NOTE — PROGRESS NOTES
Problem: Self Care Deficits Care Plan (Adult)  Goal: *Acute Goals and Plan of Care (Insert Text)  Description  GOALS UPDATED 1/22/2020:   1. Patient will complete dynamic sitting balance for ADLs with supervision. PROGRESSING 2/9/2020 (Progressing, 2/21/2020)  2. Patient will demonstrate appropriate safety awareness and protection of L UE during bed mobility and functional transfers with minimal cues. (Progressing) (Progressing, 2/21/2020)  3. Patient will complete total body bathing and dressing with moderate assistance and adaptive equipment as needed. ( [place] 2/9/2020 (Progressing, 2/21/2020)  4. Patient will complete weightbearing into the L UE with ADL tasks with minimal assistance to improve ability to use as a functional assist during ADL tasks. (Progressing) (Progressing, 2/21/2020)  5.5. Patient will demonstrate L UE SROM HEP within 7 days. 6. Patient will complete bed mobility with stand by assistance and adaptive equipment as needed in preparation for functional transfers. PROGRESSING 2/9/2020  7. 7. Patient will complete functional transfers with minimal assistance with equipment as needed. (New goal)8. (Goal Met)    Timeframe: 7 visits       Outcome: Progressing Towards Goal     OCCUPATIONAL THERAPY: Daily Note and PM    2/21/2020  INPATIENT: OT Visit Days: 4  Payor: MEDICAID PENDING / Plan: Franck [place] PENDING / Product Type: Medicaid /      NAME/AGE/GENDER: Henry Smiley is a 53 y.o. male   PRIMARY DIAGNOSIS:  Acute ischemic stroke ([address] [I63.9]  Cerebrovascular accident (CVA) due to embolism ([address] [J23.5] Acute embolic stroke ([address] Acute embolic stroke ([address]       ICD-10: Treatment Diagnosis:    · Generalized Muscle Weakness (M62.81)  · Other lack of cordination (R27.8)  · Hemiplegia and hemiparesis following cerebral infarction affecting   · left non-dominant side (I69.354)  · Abnormal posture (R29.3)   Precautions/Allergies:    NO PULLING ON LUE   LUE in sling with shoulder joint approximated and supported   Patient has no known allergies. ASSESSMENT:     Mr. Wendy Fischer presents supine in bed upon arrival. Pt transferred to sitting with mod a and additional time. Pt sat for a few minutes with good balance while therapist donned sling and gait belt. Pt stood with min a and a keanu walker and completed functional mobility in the room and in the hallway. Pt had several LOB that had to be corrected by therapist. Pt appears to still have neglect, decreased attention, and difficulty sequencing multi cued tasks. Pt returned to his room and sat edge of bed and participated in NDT and ROM to his L UE and then returned to supine with min a. Pt left with belongings in reach. Good effort with ROM. Continue POC. This patient is appropriate for co-treatment at this time due to multiple deficits including decreased balance, decreased cognition, decreased endurance, decreased strength, and need for high level assistance to complete functional transfers and functional tasks. This section established at most recent assessment   PROBLEM LIST (Impairments causing functional limitations):  1. Decreased Strength  2. Decreased ADL/Functional Activities  3. Decreased Transfer Abilities  4. Decreased Ambulation Ability/Technique  5. Decreased Balance  6. Decreased Activity Tolerance  7. Increased Fatigue  8. Decreased Flexibility/Joint Mobility  9. Decreased Knowledge of Precautions  10. Decreased [place] with Home Exercise Program   INTERVENTIONS PLANNED: (Benefits and precautions of occupational therapy have been discussed with the patient.)  1. Activities of daily living training  2. Adaptive equipment training  3. Balance training  4. Clothing management  5. Cognitive training  6. Donning&doffing training  7. Keanu tech training  8. Neuromuscular re-eduation  9. Therapeutic activity  10.  Therapeutic exercise     TREATMENT PLAN: Frequency/Duration: Follow patient 3 times per week to address above goals.  Rehabilitation Potential For Stated Goals: Excellent     REHAB RECOMMENDATIONS (at time of discharge pending progress):    Placement: It is my opinion, based on this patient's performance to date, that Mr. Krys Grimaldo may benefit from intensive therapy at an [address] [place] [address] after discharge due to potential to make ongoing and sustainable functional improvement that is of practical value. Cora Mohan Pt functioning far below independent baseline, demonstrating good improvement and participation. Pt would likely benefit greatly and increase independence from inpatient rehab stay. Equipment:    TBD               OCCUPATIONAL PROFILE AND HISTORY:   History of Present Injury/Illness (Reason for Referral):  See H&P  Past Medical History/Comorbidities:   Mr. Krys Grimaldo  has a past medical history of Acute ischemic stroke ([address] (12/12/2019), Acute pancreatitis (11/19/2014), Cerebrovascular accident (CVA) due to embolism ([address] (12/12/2019), Diabetes ([address] (2002), Diabetes ([address] Diabetes mellitus, and Hypertension. He also has no past medical history of Arthritis, Asthma, Autoimmune disease ([address] CAD (coronary artery disease), Cancer ([address] Chronic kidney disease, COPD, Dementia, Dementia ([address] Heart failure ([address] Ill-defined condition, Infectious disease, Liver disease, Other ill-defined conditions(799.89), Psychiatric disorder, PUD (peptic ulcer disease), Seizures ([address] or Sleep disorder. Mr. Krys Grimaldo  has a past surgical history that includes hx hernia repair and hx orthopaedic.   Social History/Living Environment:   Home Environment: Apartment  # Steps to Enter: 12  Rails to Enter: Yes  [employer] : Bilateral  One/Two Story Residence: One story  Living Alone: No  Support Systems: Spouse/Significant Other/Partner  Patient Expects to be Discharged to[de-identified] Unknown  Current DME Used/Available at Home: None  Tub or Shower Type: Tub/Shower combination  Prior Level of Function/Work/Activity:  Pt lives at home with his wife. Pt is typically independent with ADL/functional mobility. Pt does not drive. Pt was working part-time at [employer]. Personal Factors:          Age:  53 y.o. Past/Current Experience:  CVA with flaccid L side        Other factors that influence how disability is experienced by the patient:  multiple co-morbidities    Number of Personal Factors/Comorbidities that affect the Plan of Care: Extensive review of physical, cognitive, and psychosocial performance (3+):  HIGH COMPLEXITY   ASSESSMENT OF OCCUPATIONAL PERFORMANCE[de-identified]   Activities of Daily Living:   Basic ADLs (From Assessment) Complex ADLs (From Assessment)   Feeding: Setup  Oral Facial Hygiene/Grooming: Moderate assistance  Bathing: Moderate assistance  Upper Body Dressing: Maximum assistance  Lower Body Dressing: Total assistance  Toileting: Total assistance Instrumental ADL  Meal Preparation: Total assistance  Homemaking: Total assistance   Grooming/Bathing/Dressing Activities of Daily Living         Upper Body Bathing  Bathing Assistance:  Moderate assistance (minimal for bathing LUE)  Position Performed: Seated edge of bed                   Bed/Mat Mobility  Rolling: Minimum assistance  Supine to Sit: Moderate assistance  Sit to Supine: Minimum assistance  Sit to Stand: Minimum assistance  Stand to Sit: Minimum assistance     Most Recent Physical Functioning:   Gross Assessment:                  Posture:     Balance:  Sitting: Impaired  Sitting - Static: Good (unsupported)  Sitting - Dynamic: Fair (occasional)  Standing: Impaired  Standing - Static: Constant support  Standing - Dynamic : Poor Bed Mobility:  Rolling: Minimum assistance  Supine to Sit: Moderate assistance  Sit to Supine: Minimum assistance  Wheelchair Mobility:     Transfers:  Sit to Stand: Minimum assistance  Stand to Sit: Minimum assistance            Patient Vitals for the past 6 hrs:   BP BP Patient Position SpO2 Pulse   02/21/20 1055 134/74 At rest 97 % 71   02/21/20 1440 (!) 131/96 Sitting 100 % 73       Mental Status  Neurologic State: Alert  Orientation Level: Oriented X4  Cognition: Follows commands  Perception: Cues to maintain midline in sitting, Cues to maintain midline in standing(pt fatigued this afternoon)  Perseveration: No perseveration noted  Safety/Judgement: Awareness of environment, Fall prevention, Decreased insight into deficits                          Physical Skills Involved:  1. Range of Motion  2. Balance  3. Strength  4. Activity Tolerance  5. Fine Motor Control  6. Gross Motor Control Cognitive Skills Affected (resulting in the inability to perform in a timely and safe manner):  1. Perception  2. Expression Psychosocial Skills Affected:  1. Habits/Routines  2. Environmental Adaptation  3. Social Interaction  4. Emotional Regulation  5. Self-Awareness  6. Awareness of Others  7. Social Roles   Number of elements that affect the Plan of Care: 5+:  HIGH COMPLEXITY   CLINICAL DECISION MAKING:   [facility] AM-PAC 6 Clicks   Daily Activity Inpatient Short Form  How much help from another person does the patient currently need. .. Total A Lot A Little None   1. Putting on and taking off regular lower body clothing? [x] 1   [] 2   [] 3   [] 4   2. Bathing (including washing, rinsing, drying)? [] 1   [x] 2   [] 3   [] 4   3. Toileting, which includes using toilet, bedpan or urinal?   [] 1   [x] 2   [] 3   [] 4   4. Putting on and taking off regular upper body clothing? [] 1   [x] 2   [] 3   [] 4   5. Taking care of personal grooming such as brushing teeth? [] 1   [x] 2   [] 3   [] 4   6. Eating meals? [] 1   [] 2   [x] 3   [] 4   © 2007, Trustees [employer], under license to [employer]. All rights reserved      Score:  Initial: 11 Most Recent: 12 (Date: 1/22/2020 )    Interpretation of Tool:  Represents activities that are increasingly more difficult (i.e. Bed mobility, Transfers, Gait).     Medical Necessity:     · Patient demonstrates   · good and excellent  ·  rehab potential due to higher previous functional level. Reason for Services/Other Comments:  · Patient continues to require skilled intervention due to   · Decreased independence with ADL/functional transfers that impacts overall quality of life. · .   Use of outcome tool(s) and clinical judgement create a POC that gives a: MODERATE COMPLEXITY         TREATMENT:   (In addition to Assessment/Re-Assessment sessions the following treatments were rendered)     Pre-treatment Symptoms/Complaints:    Pain: Initial:   Pain Intensity 1: 0  Pain Location 1: Shoulder  Pain Orientation 1: Left  Pain Intervention(s) 1: Exercise, Repositioned  Post Session:  0   Therapeutic Activity: (28 minutes): Therapeutic activities including Bed transfers, Chair transfers and static/dynamic standing to improve mobility, strength, balance and coordination. Required moderate/max a  to promote dynamic balance in standing. Neuromuscular Re-education: (28 minutes):  Exercise/activities per grid below to improve balance, coordination, kinesthetic sense, posture and proprioception. Required moderate visual, verbal, manual and tactile cues to promote static and dynamic balance in standing.    LUE Re-Education  Other Activities: PROM all planes; lateral WBing; weight shifting and balance retraining           Date:  1/27/2020 Date:  2/11/2020 Date:   2/21/2020   Activity/Exercise Parameters Parameters Parameters   LUE finger flexion, AROM gravity minimized then AAROM to complete ROM 2x10 reps 2x10 reps 3 x's 10 reps   LUE finger extension AAROM 2x10 reps 2x10 reps 3 x's 10 reps   LUE wrist extension, AROM gravity minimized then AAROM to complete ROM 1x10 reps 1x10 reps 3 x's 10 reps   LUE wrist extension, AROM against gravity  1x10 reps 1x10 reps 3 x's 10 reps   LUE wrist flexion AAROM 2x10 reps 1x10 reps 3 x's 10 reps   LUE elbow flexion/extension PROM x10 reps 1x10 reps 3 x's 10 reps   LUE shoulder flexion PROM x10 reps 1x10 reps 3 x's 10 reps        Braces/Orthotics/Lines/Etc:   · LUE sling   · O2 device: Room air  · Treatment/Session Assessment:    · Response to Treatment:  Pt demonstrated good participation. · Interdisciplinary Collaboration:   o Physical Therapy Assistant  o Certified Occupational Therapy Assistant  o Registered Nurse  · After treatment position/precautions:   o Supine in bed  o Bed/Chair-wheels locked  o Bed in low position   · Compliance with Program/Exercises: Compliant all of the time, Will assess as treatment progresses. · Recommendations/Intent for next treatment session: \"Next visit will focus on advancements to more challenging activities and reduction in assistance provided\".   Total Treatment Duration:  OT Patient Time In/Time Out  Time In: 1351  Time Out: [address] [facility]

## 2020-02-21 NOTE — PROGRESS NOTES
Problem: Patient Education: Go to Patient Education Activity  Goal: Patient/Family Education  Outcome: Progressing Towards Goal     Problem: Pressure Injury - Risk of  Goal: *Prevention of pressure injury  Description  Document Dewey Scale and appropriate interventions in the flowsheet. Outcome: Progressing Towards Goal  Note: Pressure Injury Interventions:  Sensory Interventions: Assess changes in LOC    Moisture Interventions: Absorbent underpads, Check for incontinence Q2 hours and as needed    Activity Interventions: Increase time out of bed    Mobility Interventions: Pressure redistribution bed/mattress (bed type)    Nutrition Interventions: Document food/fluid/supplement intake, Offer support with meals,snacks and hydration    Friction and Shear Interventions: Apply protective barrier, creams and emollients, Foam dressings/transparent film/skin sealants                Problem: Patient Education: Go to Patient Education Activity  Goal: Patient/Family Education  Outcome: Progressing Towards Goal     Problem: Falls - Risk of  Goal: *Absence of Falls  Description  Document Jeanne Fall Risk and appropriate interventions in the flowsheet. Outcome: Progressing Towards Goal  Note: Fall Risk Interventions:  Mobility Interventions: Bed/chair exit alarm    Mentation Interventions: Adequate sleep, hydration, pain control, Bed/chair exit alarm    Medication Interventions: Bed/chair exit alarm    Elimination Interventions: Call light in reach    History of Falls Interventions: Bed/chair exit alarm         Problem: Patient Education: Go to Patient Education Activity  Goal: Patient/Family Education  Outcome: Progressing Towards Goal     Problem: Diabetes Self-Management  Goal: *Disease process and treatment process  Description  Define diabetes and identify own type of diabetes; list 3 options for treating diabetes.   Outcome: Progressing Towards Goal  Goal: *Incorporating nutritional management into lifestyle  Description  Describe effect of type, amount and timing of food on blood glucose; list 3 methods for planning meals. Outcome: Progressing Towards Goal  Goal: *Incorporating physical activity into lifestyle  Description  State effect of exercise on blood glucose levels. Outcome: Progressing Towards Goal  Goal: *Developing strategies to promote health/change behavior  Description  Define the ABC's of diabetes; identify appropriate screenings, schedule and personal plan for screenings. Outcome: Progressing Towards Goal  Goal: *Using medications safely  Description  State effect of diabetes medications on diabetes; name diabetes medication taking, action and side effects. Outcome: Progressing Towards Goal  Goal: *Monitoring blood glucose, interpreting and using results  Description  Identify recommended blood glucose targets  and personal targets. Outcome: Progressing Towards Goal  Goal: *Prevention, detection, treatment of acute complications  Description  List symptoms of hyper- and hypoglycemia; describe how to treat low blood sugar and actions for lowering  high blood glucose level. Outcome: Progressing Towards Goal  Goal: *Prevention, detection and treatment of chronic complications  Description  Define the natural course of diabetes and describe the relationship of blood glucose levels to long term complications of diabetes.   Outcome: Progressing Towards Goal  Goal: *Developing strategies to address psychosocial issues  Description  Describe feelings about living with diabetes; identify support needed and support network  Outcome: Progressing Towards Goal     Problem: Patient Education: Go to Patient Education Activity  Goal: Patient/Family Education  Outcome: Progressing Towards Goal     Problem: Patient Education: Go to Patient Education Activity  Goal: Patient/Family Education  Outcome: Progressing Towards Goal     Problem: Nutrition Deficit  Goal: *Optimize nutritional status  Outcome: Progressing Towards Goal     Problem: Patient Education: Go to Patient Education Activity  Goal: Patient/Family Education  Outcome: Progressing Towards Goal     Problem: General Medical Care Plan  Goal: *Vital signs within specified parameters  Outcome: Progressing Towards Goal  Goal: *Labs within defined limits  Outcome: Progressing Towards Goal  Goal: *Absence of infection signs and symptoms  Description  Wash hand more often   Outcome: Progressing Towards Goal  Goal: *Optimal pain control at patient's stated goal  Outcome: Progressing Towards Goal  Goal: *Skin integrity maintained  Outcome: Progressing Towards Goal  Goal: *Fluid volume balance  Outcome: Progressing Towards Goal  Goal: *Optimize nutritional status  Outcome: Progressing Towards Goal  Goal: *Anxiety reduced or absent  Outcome: Progressing Towards Goal  Goal: *Progressive mobility and function (eg: ADL's)  Outcome: Progressing Towards Goal     Problem: Patient Education: Go to Patient Education Activity  Goal: Patient/Family Education  Outcome: Progressing Towards Goal     Problem: Patient Education: Go to Patient Education Activity  Goal: Patient/Family Education  Outcome: Progressing Towards Goal     Problem: Patient Education: Go to Patient Education Activity  Goal: Patient/Family Education  Outcome: Progressing Towards Goal     Problem: Patient Education: Go to Patient Education Activity  Goal: Patient/Family Education  Outcome: Progressing Towards Goal     Problem: Pain  Goal: *Control of Pain  Outcome: Progressing Towards Goal     Problem: Patient Education: Go to Patient Education Activity  Goal: Patient/Family Education  Outcome: Progressing Towards Goal     Problem: Patient Education: Go to Patient Education Activity  Goal: Patient/Family Education  Outcome: Progressing Towards Goal

## 2020-02-21 NOTE — PROGRESS NOTES
Problem: Pressure Injury - Risk of  Goal: *Prevention of pressure injury  Description  Document Dewey Scale and appropriate interventions in the flowsheet.   Outcome: Progressing Towards Goal  Note: Pressure Injury Interventions:  Sensory Interventions: Assess changes in LOC    Moisture Interventions: Apply protective barrier, creams and emollients, Absorbent underpads, Assess need for specialty bed, Moisture barrier, Offer toileting Q_hr, Maintain skin hydration (lotion/cream)    Activity Interventions: Increase time out of bed, Pressure redistribution bed/mattress(bed type), PT/OT evaluation    Mobility Interventions: HOB 30 degrees or less, PT/OT evaluation, Pressure redistribution bed/mattress (bed type)    Nutrition Interventions: Offer support with meals,snacks and hydration    Friction and Shear Interventions: Apply protective barrier, creams and emollients, HOB 30 degrees or less

## 2020-02-21 NOTE — PROGRESS NOTES
Hospitalist Progress Note     Admit Date:  2019  9:07 AM   Name:  Tammy Montes   Age:  53 y.o.  :  1975   MRN:  [id number]   PCP:  Jordin Phillips MD  Treatment Team: Attending Provider: Mikel Drew MD; Care Manager: Eric Ellison, RN; Care Manager: Lamar Huitron LMSW; Utilization Review: Saba Hardin RN; Nurse Practitioner: Beverly Estes NP; Charge Nurse: Daisy Nava; Physical Therapy Assistant: David Ba PTA    Subjective:   52 yo male with PMH of DM II, HTN who presented 19 with c/o left leg and arm weakness, left facial droop and dysarthria.  Code S called. MRI with acute infarctions in left cerebellar hemisphere, deep frontoparietal white matter and right paramedian yariel.  Also noted to have old lacunar infarcts. CTA without large vessel occlusions, however some stenosis noted.  Echo showed PFO, Cardiology to f/u outpatient for evaluation for closure.  Neurology consulted.  Started on ASA, statin.  Pt is uninsured, difficult placement, case management working on plan.  patient seen examined bedside. No overnight events. Doing well again without complaints. Nursing notes and chart reviewed. Review of Systems negative with exception of pertinent positives noted above.       Objective:     Patient Vitals for the past 24 hrs:   Temp Pulse Resp BP SpO2   20 0727 97.8 °F (36.6 °C) 68 17 138/87 98 %   20 0400 97.9 °F (36.6 °C) 72 16 144/88 98 %   20 0000 98.4 °F (36.9 °C) 65 16 152/83 97 %   20 2000 98.1 °F (36.7 °C) 65 16 143/85 97 %   20 1501 97.9 °F (36.6 °C) 65 16 118/79 98 %   20 1222 98.1 °F (36.7 °C) 67 18 145/86 98 %     Oxygen Therapy  O2 Sat (%): 98 % (20 0727)  Pulse via Oximetry: 66 beats per minute (20 0000)  O2 Device: Room air (02/01/20 0000)    Intake/Output Summary (Last 24 hours) at 2020 0921  Last data filed at 2020 0846  Gross per 24 hour   Intake 540 ml Output    Net 540 ml         General:    Well nourished. Alert. Left upper extremity in a sling  CV:   RRR. No murmur, rub, or gallop. Lungs:   CTAB. No wheezing, rhonchi, or rales. Abdomen:   Soft, nontender, nondistended. Extremities: Warm and dry. No cyanosis or edema. Skin:     No rashes or jaundice. Data Review:  I have reviewed all labs, meds, telemetry events, and studies from the last 24 hours. No results found for this or any previous visit (from the past 24 hour(s)). All Micro Results     None          Current Meds:  Current Facility-Administered Medications   Medication Dose Route Frequency    lip protectant (BLISTEX) ointment 1 Each  1 Each Topical PRN    acetaminophen (TYLENOL) tablet 650 mg  650 mg Oral Q6H    metFORMIN (GLUCOPHAGE) tablet 500 mg  500 mg Oral BID WITH MEALS    metoprolol succinate (TOPROL-XL) XL tablet 25 mg  25 mg Oral DAILY    polyethylene glycol (MIRALAX) packet 17 g  17 g Oral DAILY PRN    guaiFENesin (ROBITUSSIN) 100 mg/5 mL oral liquid 100 mg  100 mg Oral Q4H PRN    hydrALAZINE (APRESOLINE) 20 mg/mL injection 20 mg  20 mg IntraVENous Q4H PRN    atorvastatin (LIPITOR) tablet 80 mg  80 mg Oral QHS    lisinopril (PRINIVIL, ZESTRIL) tablet 40 mg  40 mg Oral DAILY    sodium chloride (NS) flush 5-40 mL  5-40 mL IntraVENous PRN    ondansetron (ZOFRAN) injection 4 mg  4 mg IntraVENous Q4H PRN    aspirin chewable tablet 81 mg  81 mg Oral DAILY    enoxaparin (LOVENOX) injection 40 mg  40 mg SubCUTAneous Q24H    dextrose 40% (GLUTOSE) oral gel 1 Tube  15 g Oral PRN    glucagon (GLUCAGEN) injection 1 mg  1 mg IntraMUSCular PRN    dextrose (D50W) injection syrg 12.5-25 g  25-50 mL IntraVENous PRN       Other Studies (last 24 hours):  No results found.     Assessment and Plan:     Hospital Problems as of 2/21/2020 Date Reviewed: 3/3/2017          Codes Class Noted - Resolved POA    PFO (patent foramen ovale) ICD-10-CM: Q21.1  ICD-9-CM: 745.5  12/17/2019 - Present Yes        Arrhythmia ICD-10-CM: I49.9  ICD-9-CM: 427.9  12/15/2019 - Present Yes        Hyperlipemia ICD-10-CM: E78.5  ICD-9-CM: 272.4  12/15/2019 - Present Yes        * (Principal) Acute embolic stroke [facility]) [id number]: I63.9  ICD-9-CM: 434.11  12/12/2019 - Present Yes        Hypertension (Chronic) ICD-10-CM: I10  ICD-9-CM: 401.9  Unknown - Present Yes        Type 2 diabetes mellitus (HCC) (Chronic) ICD-10-CM: E11.9  ICD-9-CM: 250.00  Unknown - Present Yes              PLAN:    Acute embolic stroke  -is \"outpatient ready\" so no routine labs indicated at this point  -MRI with acute infarcts in L cerebellar hemisphere, deep frontoparietal white matter, and R paramedian yariel. old lacunar infarcts.    -ISMAEL: 3 mm PFO, Cardiology stated he needs an evaluation for closure PFO with high risk features including significant (3 mm) separation of septum secundum and primum as well as large shunt.  Will await recovery of CVA.  Plan is 4 week event monitor as outpatient.  Will then see in office and if no arrhythmias, will plan PFO closure  -continue statin, ASA     Hypertension:   -lisinopril, metoprolol     Type 2 diabetes mellitus:    -cont current     DISPO: waiting for pt to be deemed \"disabled\" which in neurologic cases have to allow for 6 months showing chronicity.  Then he can get medicaid after that.   DVT ppx:  lovenox    Signed:  Luke Hayden MD

## 2020-02-21 NOTE — PROGRESS NOTES
Problem: Mobility Impaired (Adult and Pediatric)  Goal: *Acute Goals and Plan of Care  Description  Acute PT Goals (reviewed & updated 2/12/2020)  STG:  (1.) Mr. Alondra Altamirano will demonstrate good dynamic sitting balance within 3 treatment days. (2) Mr. Alondra Altamirano will transfer sit to stand with [facility] within 3 treatment days. (3.)Mr. John Ludwig will transfer from bed to chair and chair to bed via [facility] with MODERATE ASSIST x1 using the least restrictive device within 3 treatment day(s). (4.) Mr. Alondra Altamirano will ambulate with MODERATE ASSIST for 25+ feet with the least restrictive device within 3 treatment day(s). (5.)Mr. Alondra Altamirano will tolerate sitting up in chair/wheelchair x4 hours with 0/10 pain within 3 treatment days. LTG:  (1.) Mr. Alondra Altamirano will perform bed mobility with CONTACT GUARD ASSISTANCE within 7 treatment day(s). (2.) Mr. Alondra lAtamirano will transfer from bed to chair and chair to bed via [facility] with MINIMAL ASSIST using the least restrictive device within 7 treatment day(s). (3.) Mr. Alondra Altamirano will ambulate with MINIMAL ASSIST for 50+ feet with the least restrictive device within 7 treatment day(s). (4.)Mr. Alondra Altamirano will demonstrate FAIR STATIC STANDING balance within 7 treatment days. MET GOALS:      PHYSICAL THERAPY: Daily Note and PM 2/21/2020  INPATIENT: PT Visit Days : 6  Payor: MEDICAID PENDING / Plan: [place] PENDING / Product Type: Medicaid /       NAME/AGE/GENDER: Chel Wood is a 53 y.o. male   PRIMARY DIAGNOSIS: Acute ischemic stroke ([address] [I63.9]  Cerebrovascular accident (CVA) due to embolism ([address] [Z37.0] Acute embolic stroke ([address] Acute embolic stroke ([address]       ICD-10: Treatment Diagnosis:   · Difficulty in walking, Not elsewhere classified (R26.2)  · Hemiplegia and hemiparesis following cerebral infarction affecting left non-dominant side (W85.427)   Precaution/Allergies:  Patient has no known allergies.       ASSESSMENT:      Yvette Lindsey is a 53year old admitted R MCA CVA with left hemiparesis. Patient was supine upon contact and agreeable to PT. Patient performs supine to sit to the right requiring  Mod assist. He attempted several times without assistance without success. He also required additional time and cues to perform supine to sit with proper technique. He stood and amb 36' with several LOB's requiring mod assist to correct. He required repeated cues for sequencing hemiwalker and gt. He sat in w/c that was being pushed behind him and rested. Then stood and amb about 36' again in same manner. After another rest he worked on [employer] the w/c pulling it with his LE's. With additional time and cues he was able to do this a little bit using his left LE. He propelled w/c  About 40' . He was pushed back to room in the w/c. He did not want to sit up so he transferred over to the bed with mod a and additional time. Worked on sitting balance and he tried AP's and LAQ's. He was left sitting EOB with M[place]OVERN. Overall, slow, steady  progress towards physical therapy goals. Goals listed above are still appropriate. Will continue efforts as patient is still below functional baseline. At this time, patient is appropriate for Co-treatment with occupational therapy due to patient's decreased overall endurance/tolerance levels, as well as need for high level skilled assistance to complete functional transfers/mobility and functional tasks. Dio Pavon is appropriate for a multidisciplinary co-treatment of PT and OT to address goals of both disciplines. This section established at most recent assessment   PROBLEM LIST (Impairments causing functional limitations):  1. Decreased Strength  2. Decreased ADL/Functional Activities  3. Decreased Transfer Abilities  4. Decreased Ambulation Ability/Technique  5. Decreased Balance  6. Increased Pain  7. Decreased Activity Tolerance  8. Increased Fatigue  9.  Decreased Flexibility/Joint Mobility   INTERVENTIONS PLANNED: (Benefits and precautions of physical therapy have been discussed with the patient.)  1. Balance Exercise  2. Bed Mobility  3. Family Education  4. Gait Training  5. Home Exercise Program (HEP)  6. Manual Therapy  7. Neuromuscular Re-education/Strengthening  8. Range of Motion (ROM)  9. Therapeutic Activites  10. Therapeutic Exercise/Strengthening  11. Transfer Training     TREATMENT PLAN: Frequency/Duration: 3 times a week for duration of hospital stay  Rehabilitation Potential For Stated Goals: Good     REHAB RECOMMENDATIONS (at time of discharge pending progress):    Placement: It is my opinion, based on this patient's performance to date, that Mr. Jonathon Smith may benefit from intensive therapy at an [address] [place] [address] after discharge due to a probable need for close medical supervision by a rehab physician, a probable need for multiple therapy disciplines and potential to make ongoing and sustainable functional improvement that is of practical value. .  Equipment:    TBD pending progress with therapy. HISTORY:   History of Present Injury/Illness (Reason for Referral):  Per H&P: \"Pt is a 54 y/o smoker with DM, HTN, who presented to ER with L leg and arm weakness, L facial droop, dysarthria. First noted L leg weakness late night 12/11 when he woke up to go to the bathroom. He was normal when he went to bed around 1030. Woke up this morning and had persistent weakness L leg and also now noted in L arm. EMS called. Noted to have slurred speech and L facial droop as well. Code S called in ER around 9am.  MRI with acute infarcts in L cerebellar hemisphere, deep frontoparietal white matter, and R paramedian yariel. Also noted old lacunar infarcts. No large vessel occlusion on CTA, but some stenosis noted. No hx afib, TIA, CVA. No CP, palpitations, SOB. \"  Past Medical History/Comorbidities:   Mr. Jonathon Smith  has a past medical history of Acute ischemic stroke ([facility] (12/12/2019), Acute pancreatitis (11/19/2014), Cerebrovascular accident (CVA) due to embolism ([facility] (12/12/2019), Diabetes ([facility] (2002), Diabetes ([facility] Diabetes mellitus, and Hypertension. He also has no past medical history of Arthritis, Asthma, Autoimmune disease ([facility] CAD (coronary artery disease), Cancer ([address] Chronic kidney disease, COPD, Dementia, Dementia ([address] Heart failure ([address] Ill-defined condition, Infectious disease, Liver disease, Other ill-defined conditions(799.89), Psychiatric disorder, PUD (peptic ulcer disease), Seizures ([address] or Sleep disorder. Mr. Alondra Altamirano  has a past surgical history that includes hx hernia repair and hx orthopaedic. Social History/Living Environment:   Home Environment: Apartment  # Steps to Enter: 12  Rails to Enter: Yes  [employer] : Bilateral  One/Two Story Residence: One story  Living Alone: No  Support Systems: Spouse/Significant Other/Partner  Patient Expects to be Discharged to[de-identified] Unknown  Current DME Used/Available at Home: None  Tub or Shower Type: Tub/Shower combination  Prior Level of Function/Work/Activity:  Independent, lives with wife in 2nd story 1 level apartment. No recent falls. Number of Personal Factors/Comorbidities that affect the Plan of Care: 1-2: MODERATE COMPLEXITY   EXAMINATION:   Most Recent Physical Functioning:   Gross Assessment:                  Posture:     Balance:  Sitting - Static: Good (unsupported)  Sitting - Dynamic: Fair (occasional)  Standing - Static: Fair  Standing - Dynamic : Poor(+) Bed Mobility:  Supine to Sit: Moderate assistance  Wheelchair Mobility:     Transfers:  Sit to Stand: Minimum assistance  Stand to Sit: Minimum assistance  Gait:     Base of Support: Center of gravity altered;Narrowed  Speed/Clotilde: Shuffled; Slow  Step Length: Left shortened;Right shortened  Stance: Left decreased;Right decreased  Gait Abnormalities: Hemiplegic; Shuffling gait  Distance (ft): 20 Feet (ft)(x 2)  Assistive Device: Gait belt;Walker luke  Ambulation - Level of Assistance: Minimal assistance; Moderate assistance  Interventions: Manual cues; Safety awareness training;Verbal cues      Body Structures Involved:  1. Nerves  2. Voice/Speech  3. Bones  4. Joints  5. Muscles Body Functions Affected:  1. Mental  2. Sensory/Pain  3. Neuromusculoskeletal  4. Movement Related Activities and Participation Affected:  1. General Tasks and Demands  2. Mobility  3. Self Care  4. Interpersonal Interactions and Relationships   Number of elements that affect the Plan of Care: 4+: HIGH COMPLEXITY   CLINICAL PRESENTATION:   Presentation: Evolving clinical presentation with changing clinical characteristics: MODERATE COMPLEXITY   CLINICAL DECISION MAKIN [facility] Mobility Inpatient Short Form  How much difficulty does the patient currently have. .. Unable A Lot A Little None   1. Turning over in bed (including adjusting bedclothes, sheets and blankets)? [] 1   [] 2   [x] 3   [] 4   2. Sitting down on and standing up from a chair with arms ( e.g., wheelchair, bedside commode, etc.)   [] 1   [x] 2   [] 3   [] 4   3. Moving from lying on back to sitting on the side of the bed? [] 1   [] 2   [x] 3   [] 4   How much help from another person does the patient currently need. .. Total A Lot A Little None   4. Moving to and from a bed to a chair (including a wheelchair)? [] 1   [x] 2   [] 3   [] 4   5. Need to walk in hospital room? [] 1   [x] 2   [] 3   [] 4   6. Climbing 3-5 steps with a railing? [] 1   [x] 2   [] 3   [] 4   © , Trustees of [address] [place] [address] [address] [address], under license to [employer]. All rights reserved      Score:  Initial: 13 Most Recent: 14 (Date:2020)    Interpretation of Tool:  Represents activities that are increasingly more difficult (i.e. Bed mobility, Transfers, Gait).     Medical Necessity:     · Patient is expected to demonstrate progress in   · strength, range of motion, balance, coordination, and functional technique  ·  to   · increase independence with all mobility. · .  Reason for Services/Other Comments:  · Patient continues to require skilled intervention due to   · medical complications and mobility deficits which impact his level of function, safety, and independence as indicated above. · .   Use of outcome tool(s) and clinical judgement create a POC that gives a: Questionable prediction of patient's progress: MODERATE COMPLEXITY        TREATMENT:      Pre-treatment Symptoms/Complaints:  none  Pain: Initial: None Post Session:  None     Gait Training (  25Minutes):  Gait training to improve and/or restore physical functioning as related to mobility, strength and balance. Ambulated 20 Feet (ft)(x 2) with Minimal assistance; Moderate assistance using a Gait belt;Walker luke and moderate Manual cues; Safety awareness training;Verbal cues related to their sequencing, knee positioning, foot placement, step length, weight shifts, luke walker placement, and posture to promote proper body alignment, promote proper body posture and promote proper body mechanics. Instruction in performance of the above deficits to correct overall gait sequencing and quality. Therapeutic Activity: (    13 Minutes): Therapeutic activities including bed mobility training, transfers from various surfaces, static/dynamic sitting/standing balance, posture training, scooting, w/c mobility, and patient education to improve mobility, strength and balance. Required moderate Manual cues; Safety awareness training;Verbal cues to promote static and dynamic balance in standing and promote coordination of bilateral, lower extremity(s). Today's treatment session addressed Decreased Transfer Abilities, Decreased Ambulation Ability/Technique, Decreased Balance and Decreased Activity Tolerance to progress towards achieving goal(s) 2 & 4.  During this session, Occupational Therapy addressed Activity tolerance to progress towards their discipline specific goal(s). Co-treatment was necessary to improve patient's ability to increase activity demands and ability to return to normal functional activity. Braces/Orthotics/Lines/Etc:   · Sling to L UE throughout transfers and ambulation  Treatment/Session Assessment:    · Response to Treatment:  See above  · Interdisciplinary Collaboration:   o Physical Therapy Assistant  o Certified Occupational Therapy Assistant  o Registered Nurse  · After treatment position/precautions:   o Supine in bed  o Bed alarm/tab alert on  o Bed/Chair-wheels locked  o Bed in low position  o Call light within reach  o RN notified   · Compliance with Program/Exercises: Compliant all of the time  · Recommendations/Intent for next treatment session: \"Next visit will focus on advancements to more challenging activities and reduction in assistance provided\".     Total Treatment Duration:  PT Patient Time In/Time Out  Time In: 5411  Time Out: 31644 [address], PTA

## 2020-02-22 PROCEDURE — 74011250637 HC RX REV CODE- 250/637: Performed by: INTERNAL MEDICINE

## 2020-02-22 PROCEDURE — 74011250637 HC RX REV CODE- 250/637: Performed by: HOSPITALIST

## 2020-02-22 PROCEDURE — 74011250636 HC RX REV CODE- 250/636: Performed by: INTERNAL MEDICINE

## 2020-02-22 PROCEDURE — 65270000029 HC RM PRIVATE

## 2020-02-22 RX ADMIN — POLYETHYLENE GLYCOL 3350 17 G: 17 POWDER, FOR SOLUTION ORAL at 08:47

## 2020-02-22 RX ADMIN — LISINOPRIL 40 MG: 20 TABLET ORAL at 08:47

## 2020-02-22 RX ADMIN — METFORMIN HYDROCHLORIDE 500 MG: 500 TABLET, FILM COATED ORAL at 17:16

## 2020-02-22 RX ADMIN — ACETAMINOPHEN 650 MG: 325 TABLET, FILM COATED ORAL at 12:45

## 2020-02-22 RX ADMIN — ENOXAPARIN SODIUM 40 MG: 40 INJECTION SUBCUTANEOUS at 13:43

## 2020-02-22 RX ADMIN — ACETAMINOPHEN 650 MG: 325 TABLET, FILM COATED ORAL at 17:16

## 2020-02-22 RX ADMIN — METOPROLOL SUCCINATE 25 MG: 25 TABLET, FILM COATED, EXTENDED RELEASE ORAL at 08:47

## 2020-02-22 RX ADMIN — ACETAMINOPHEN 650 MG: 325 TABLET, FILM COATED ORAL at 06:29

## 2020-02-22 RX ADMIN — ACETAMINOPHEN 650 MG: 325 TABLET, FILM COATED ORAL at 23:46

## 2020-02-22 RX ADMIN — ASPIRIN 81 MG 81 MG: 81 TABLET ORAL at 08:47

## 2020-02-22 RX ADMIN — GUAIFENESIN 100 MG: 100 SOLUTION ORAL at 17:20

## 2020-02-22 RX ADMIN — METFORMIN HYDROCHLORIDE 500 MG: 500 TABLET, FILM COATED ORAL at 08:47

## 2020-02-22 RX ADMIN — ATORVASTATIN CALCIUM 80 MG: 80 TABLET, FILM COATED ORAL at 21:53

## 2020-02-22 NOTE — PROGRESS NOTES
Problem: Patient Education: Go to Patient Education Activity  Goal: Patient/Family Education  Outcome: Progressing Towards Goal     Problem: Pressure Injury - Risk of  Goal: *Prevention of pressure injury  Description  Document Dewey Scale and appropriate interventions in the flowsheet.   Outcome: Progressing Towards Goal  Note: Pressure Injury Interventions:  Sensory Interventions: Assess changes in LOC    Moisture Interventions: Absorbent underpads, Apply protective barrier, creams and emollients, Assess need for specialty bed, Offer toileting Q_hr, Moisture barrier, Maintain skin hydration (lotion/cream), Limit adult briefs, Minimize layers    Activity Interventions: PT/OT evaluation, Pressure redistribution bed/mattress(bed type), Increase time out of bed    Mobility Interventions: HOB 30 degrees or less, Pressure redistribution bed/mattress (bed type), PT/OT evaluation    Nutrition Interventions: Offer support with meals,snacks and hydration    Friction and Shear Interventions: HOB 30 degrees or less, Minimize layers

## 2020-02-22 NOTE — PROGRESS NOTES
Hospitalist Progress Note     Admit Date:  2019  9:07 AM   Name:  Daina Pinzon   Age:  53 y.o.  :  1975   MRN:  [id number]   PCP:  Paula Jiménez MD  Treatment Team: Attending Provider: Diamond Pallas, MD; Care Manager: Chantale Peña RN; Care Manager: Callie Mir LMSW; Utilization Review: Josh Koenig RN; Nurse Practitioner: Alpesh Beltran NP    Subjective:   54 yo male with PMH of DM II, HTN who presented 19 with c/o left leg and arm weakness, left facial droop and dysarthria.  Code S called. MRI with acute infarctions in left cerebellar hemisphere, deep frontoparietal white matter and right paramedian yariel.  Also noted to have old lacunar infarcts. CTA without large vessel occlusions, however some stenosis noted.  Echo showed PFO, Cardiology to f/u outpatient for evaluation for closure.  Neurology consulted.  Started on ASA, statin.  Pt is uninsured, difficult placement, case management working on plan.      patient seen and examiend at bedside. Complained of constipation yesterday and got miralax. Went this morning. Did not have much of an appetite this morning. Nursing notes and chart reviewed. Review of Systems negative with exception of pertinent positives noted above.       Objective:     Patient Vitals for the past 24 hrs:   Temp Pulse Resp BP SpO2   20 0719 98.5 °F (36.9 °C) 78 18 (!) 145/92 96 %   20 0418    (!) 149/100    20 0417    (!) 154/102    20 0416 97.9 °F (36.6 °C) 88 17 (!) 153/104 99 %   20 2352 97.6 °F (36.4 °C) 76 17 (!) 153/95 98 %   20 1951 98.3 °F (36.8 °C) 84 18 (!) 156/92 98 %   20 1440 98.5 °F (36.9 °C) 73 18 (!) 131/96 100 %   20 1055 98.1 °F (36.7 °C) 71 17 134/74 97 %     Oxygen Therapy  O2 Sat (%): 96 % (20 0719)  Pulse via Oximetry: 66 beats per minute (20 0000)  O2 Device: Room air (20 0000)    Intake/Output Summary (Last 24 hours) at 2020 5262  Last data filed at 2/22/2020 0827  Gross per 24 hour   Intake 330 ml   Output    Net 330 ml         General:    Well nourished. Alert. LUE in sling  CV:   RRR. No murmur, rub, or gallop. Lungs:   CTAB. No wheezing, rhonchi, or rales. Abdomen:   Soft, nontender, nondistended. Extremities: Warm and dry. No cyanosis or edema. Skin:     No rashes or jaundice. Data Review:  I have reviewed all labs, meds, telemetry events, and studies from the last 24 hours. No results found for this or any previous visit (from the past 24 hour(s)). All Micro Results     None          Current Meds:  Current Facility-Administered Medications   Medication Dose Route Frequency    lip protectant (BLISTEX) ointment 1 Each  1 Each Topical PRN    acetaminophen (TYLENOL) tablet 650 mg  650 mg Oral Q6H    metFORMIN (GLUCOPHAGE) tablet 500 mg  500 mg Oral BID WITH MEALS    metoprolol succinate (TOPROL-XL) XL tablet 25 mg  25 mg Oral DAILY    polyethylene glycol (MIRALAX) packet 17 g  17 g Oral DAILY PRN    guaiFENesin (ROBITUSSIN) 100 mg/5 mL oral liquid 100 mg  100 mg Oral Q4H PRN    hydrALAZINE (APRESOLINE) 20 mg/mL injection 20 mg  20 mg IntraVENous Q4H PRN    atorvastatin (LIPITOR) tablet 80 mg  80 mg Oral QHS    lisinopril (PRINIVIL, ZESTRIL) tablet 40 mg  40 mg Oral DAILY    sodium chloride (NS) flush 5-40 mL  5-40 mL IntraVENous PRN    ondansetron (ZOFRAN) injection 4 mg  4 mg IntraVENous Q4H PRN    aspirin chewable tablet 81 mg  81 mg Oral DAILY    enoxaparin (LOVENOX) injection 40 mg  40 mg SubCUTAneous Q24H    dextrose 40% (GLUTOSE) oral gel 1 Tube  15 g Oral PRN    glucagon (GLUCAGEN) injection 1 mg  1 mg IntraMUSCular PRN    dextrose (D50W) injection syrg 12.5-25 g  25-50 mL IntraVENous PRN       Other Studies (last 24 hours):  No results found.     Assessment and Plan:     Hospital Problems as of 2/22/2020 Date Reviewed: 3/3/2017          Codes Class Noted - Resolved POA    PFO (patent foramen ovale) ICD-10-CM: Q21.1  ICD-9-CM: 745.5  12/17/2019 - Present Yes        Arrhythmia ICD-10-CM: I49.9  ICD-9-CM: 427.9  12/15/2019 - Present Yes        Hyperlipemia ICD-10-CM: E78.5  ICD-9-CM: 272.4  12/15/2019 - Present Yes        * (Principal) Acute embolic stroke [facility]) [id number]: I63.9  ICD-9-CM: 434.11  12/12/2019 - Present Yes        Hypertension (Chronic) ICD-10-CM: I10  ICD-9-CM: 401.9  Unknown - Present Yes        Type 2 diabetes mellitus (HCC) (Chronic) ICD-10-CM: E11.9  ICD-9-CM: 250.00  Unknown - Present Yes              PLAN:    Acute embolic stroke  -is \"outpatient ready\" so no routine labs indicated at this point  -MRI with acute infarcts in L cerebellar hemisphere, deep frontoparietal white matter, and R paramedian yariel. old lacunar infarcts.    -ISMAEL: 3 mm PFO, Cardiology stated he needs an evaluation for closure PFO with high risk features including significant (3 mm) separation of septum secundum and primum as well as large shunt.  Will await recovery of CVA.  Plan is 4 week event monitor as outpatient.  Will then see in office and if no arrhythmias, will plan PFO closure  -continue statin, ASA     Hypertension:   -lisinopril, metoprolol     Type 2 diabetes mellitus:    -cont current     DISPO: waiting for pt to be deemed \"disabled\" which in neurologic cases have to allow for 6 months showing chronicity.  Then he can get medicaid after that.   DVT ppx:  lovenox     Signed:  Boy Vaz MD

## 2020-02-22 NOTE — PROGRESS NOTES
Problem: Patient Education: Go to Patient Education Activity  Goal: Patient/Family Education  Outcome: Progressing Towards Goal     Problem: Pressure Injury - Risk of  Goal: *Prevention of pressure injury  Description  Document Dewey Scale and appropriate interventions in the flowsheet. Outcome: Progressing Towards Goal  Note: Pressure Injury Interventions:  Sensory Interventions: Assess changes in LOC    Moisture Interventions: Absorbent underpads, Check for incontinence Q2 hours and as needed    Activity Interventions: Increase time out of bed    Mobility Interventions: Pressure redistribution bed/mattress (bed type)    Nutrition Interventions: Document food/fluid/supplement intake, Offer support with meals,snacks and hydration    Friction and Shear Interventions: Apply protective barrier, creams and emollients, HOB 30 degrees or less                Problem: Patient Education: Go to Patient Education Activity  Goal: Patient/Family Education  Outcome: Progressing Towards Goal     Problem: Falls - Risk of  Goal: *Absence of Falls  Description  Document Jeanne Fall Risk and appropriate interventions in the flowsheet. Outcome: Progressing Towards Goal  Note: Fall Risk Interventions:  Mobility Interventions: Bed/chair exit alarm    Mentation Interventions: Adequate sleep, hydration, pain control, Bed/chair exit alarm, Evaluate medications/consider consulting pharmacy    Medication Interventions: Bed/chair exit alarm    Elimination Interventions: Call light in reach    History of Falls Interventions: Bed/chair exit alarm         Problem: Patient Education: Go to Patient Education Activity  Goal: Patient/Family Education  Outcome: Progressing Towards Goal     Problem: Diabetes Self-Management  Goal: *Disease process and treatment process  Description  Define diabetes and identify own type of diabetes; list 3 options for treating diabetes.   Outcome: Progressing Towards Goal  Goal: *Incorporating nutritional management into lifestyle  Description  Describe effect of type, amount and timing of food on blood glucose; list 3 methods for planning meals. Outcome: Progressing Towards Goal  Goal: *Incorporating physical activity into lifestyle  Description  State effect of exercise on blood glucose levels. Outcome: Progressing Towards Goal  Goal: *Developing strategies to promote health/change behavior  Description  Define the ABC's of diabetes; identify appropriate screenings, schedule and personal plan for screenings. Outcome: Progressing Towards Goal  Goal: *Using medications safely  Description  State effect of diabetes medications on diabetes; name diabetes medication taking, action and side effects. Outcome: Progressing Towards Goal  Goal: *Monitoring blood glucose, interpreting and using results  Description  Identify recommended blood glucose targets  and personal targets. Outcome: Progressing Towards Goal  Goal: *Prevention, detection, treatment of acute complications  Description  List symptoms of hyper- and hypoglycemia; describe how to treat low blood sugar and actions for lowering  high blood glucose level. Outcome: Progressing Towards Goal  Goal: *Prevention, detection and treatment of chronic complications  Description  Define the natural course of diabetes and describe the relationship of blood glucose levels to long term complications of diabetes.   Outcome: Progressing Towards Goal  Goal: *Developing strategies to address psychosocial issues  Description  Describe feelings about living with diabetes; identify support needed and support network  Outcome: Progressing Towards Goal     Problem: Patient Education: Go to Patient Education Activity  Goal: Patient/Family Education  Outcome: Progressing Towards Goal     Problem: Patient Education: Go to Patient Education Activity  Goal: Patient/Family Education  Outcome: Progressing Towards Goal     Problem: Nutrition Deficit  Goal: *Optimize nutritional status  Outcome: Progressing Towards Goal     Problem: Patient Education: Go to Patient Education Activity  Goal: Patient/Family Education  Outcome: Progressing Towards Goal     Problem: General Medical Care Plan  Goal: *Vital signs within specified parameters  Outcome: Progressing Towards Goal  Goal: *Labs within defined limits  Outcome: Progressing Towards Goal  Goal: *Absence of infection signs and symptoms  Description  Wash hand more often   Outcome: Progressing Towards Goal  Goal: *Optimal pain control at patient's stated goal  Outcome: Progressing Towards Goal  Goal: *Skin integrity maintained  Outcome: Progressing Towards Goal  Goal: *Fluid volume balance  Outcome: Progressing Towards Goal  Goal: *Optimize nutritional status  Outcome: Progressing Towards Goal  Goal: *Anxiety reduced or absent  Outcome: Progressing Towards Goal  Goal: *Progressive mobility and function (eg: ADL's)  Outcome: Progressing Towards Goal     Problem: Patient Education: Go to Patient Education Activity  Goal: Patient/Family Education  Outcome: Progressing Towards Goal     Problem: Patient Education: Go to Patient Education Activity  Goal: Patient/Family Education  Outcome: Progressing Towards Goal     Problem: Patient Education: Go to Patient Education Activity  Goal: Patient/Family Education  Outcome: Progressing Towards Goal     Problem: Patient Education: Go to Patient Education Activity  Goal: Patient/Family Education  Outcome: Progressing Towards Goal     Problem: Pain  Goal: *Control of Pain  Outcome: Progressing Towards Goal     Problem: Patient Education: Go to Patient Education Activity  Goal: Patient/Family Education  Outcome: Progressing Towards Goal     Problem: Patient Education: Go to Patient Education Activity  Goal: Patient/Family Education  Outcome: Progressing Towards Goal

## 2020-02-23 LAB — GLUCOSE BLD STRIP.AUTO-MCNC: 230 MG/DL (ref 65–100)

## 2020-02-23 PROCEDURE — 82962 GLUCOSE BLOOD TEST: CPT

## 2020-02-23 PROCEDURE — 74011250637 HC RX REV CODE- 250/637: Performed by: INTERNAL MEDICINE

## 2020-02-23 PROCEDURE — 65270000029 HC RM PRIVATE

## 2020-02-23 PROCEDURE — 74011250637 HC RX REV CODE- 250/637: Performed by: HOSPITALIST

## 2020-02-23 PROCEDURE — 74011250636 HC RX REV CODE- 250/636: Performed by: INTERNAL MEDICINE

## 2020-02-23 RX ORDER — ACETAMINOPHEN 325 MG/1
650 TABLET ORAL EVERY 8 HOURS
Status: DISCONTINUED | OUTPATIENT
Start: 2020-02-23 | End: 2020-06-10 | Stop reason: HOSPADM

## 2020-02-23 RX ADMIN — ENOXAPARIN SODIUM 40 MG: 40 INJECTION SUBCUTANEOUS at 14:13

## 2020-02-23 RX ADMIN — GUAIFENESIN 100 MG: 100 SOLUTION ORAL at 18:24

## 2020-02-23 RX ADMIN — METOPROLOL SUCCINATE 25 MG: 25 TABLET, FILM COATED, EXTENDED RELEASE ORAL at 08:46

## 2020-02-23 RX ADMIN — LISINOPRIL 40 MG: 20 TABLET ORAL at 08:45

## 2020-02-23 RX ADMIN — POLYETHYLENE GLYCOL 3350 17 G: 17 POWDER, FOR SOLUTION ORAL at 17:08

## 2020-02-23 RX ADMIN — ASPIRIN 81 MG 81 MG: 81 TABLET ORAL at 08:46

## 2020-02-23 RX ADMIN — ATORVASTATIN CALCIUM 80 MG: 80 TABLET, FILM COATED ORAL at 21:11

## 2020-02-23 RX ADMIN — ACETAMINOPHEN 650 MG: 325 TABLET, FILM COATED ORAL at 05:41

## 2020-02-23 RX ADMIN — ACETAMINOPHEN 650 MG: 325 TABLET, FILM COATED ORAL at 21:11

## 2020-02-23 RX ADMIN — METFORMIN HYDROCHLORIDE 500 MG: 500 TABLET, FILM COATED ORAL at 08:46

## 2020-02-23 RX ADMIN — ACETAMINOPHEN 650 MG: 325 TABLET, FILM COATED ORAL at 14:13

## 2020-02-23 RX ADMIN — METFORMIN HYDROCHLORIDE 500 MG: 500 TABLET, FILM COATED ORAL at 17:08

## 2020-02-23 NOTE — PROGRESS NOTES
Hospitalist Progress Note     Admit Date:  2019  9:07 AM   Name:  Carlos Strauss   Age:  53 y.o.  :  1975   MRN:  [id number]   PCP:  Delgado Ochoa MD  Treatment Team: Attending Provider: Malissa Penn MD; Care Manager: Royal Eli RN; Care Manager: Nicole Telles LMSW; Utilization Review: Celestina Garcia RN; Nurse Practitioner: Madelin Serrano NP    Subjective:     54 yo male with PMH of DM II, HTN who presented 19 with c/o left leg and arm weakness, left facial droop and dysarthria.  Code S called. MRI with acute infarctions in left cerebellar hemisphere, deep frontoparietal white matter and right paramedian yariel.  Also noted to have old lacunar infarcts. CTA without large vessel occlusions, however some stenosis noted.  Echo showed PFO, Cardiology to f/u outpatient for evaluation for closure.  Neurology consulted.  Started on ASA, statin.  Pt is uninsured, difficult placement, case management working on plan.  patient seen and examined at bedside. No overnight events. No complaints today. Nursing notes and chart reviewed. Review of Systems negative with exception of pertinent positives noted above. Objective:     Patient Vitals for the past 24 hrs:   Temp Pulse Resp BP SpO2   20 0734 98.2 °F (36.8 °C) 69 18 128/83 95 %   20 0400 97.3 °F (36.3 °C) 74 18 135/68 94 %   20 0000 97.9 °F (36.6 °C) 64 18 157/89 94 %   20 2000 97.9 °F (36.6 °C) 69 18 154/90 93 %   20 1446 98.3 °F (36.8 °C) 72 18 146/85 97 %   20 1104 98.2 °F (36.8 °C) 79 18 138/87 97 %     Oxygen Therapy  O2 Sat (%): 95 % (20 0734)  Pulse via Oximetry: 66 beats per minute (20 0000)  O2 Device: Room air (20 0000)  No intake or output data in the 24 hours ending 20 1004      General:    Well nourished. Alert. LUE not in sling today    CV:   RRR. No murmur, rub, or gallop. Lungs:   CTAB. No wheezing, rhonchi, or rales.   Abdomen: Soft, nontender, nondistended. Extremities: Warm and dry. No cyanosis or edema. Skin:     No rashes or jaundice. Data Review:  I have reviewed all labs, meds, telemetry events, and studies from the last 24 hours. Recent Results (from the past 24 hour(s))   GLUCOSE, POC    Collection Time: 02/23/20  8:44 AM   Result Value Ref Range    Glucose (POC) 230 (H) 65 - 100 mg/dL        All Micro Results     None          Current Meds:  Current Facility-Administered Medications   Medication Dose Route Frequency    lip protectant (BLISTEX) ointment 1 Each  1 Each Topical PRN    acetaminophen (TYLENOL) tablet 650 mg  650 mg Oral Q6H    metFORMIN (GLUCOPHAGE) tablet 500 mg  500 mg Oral BID WITH MEALS    metoprolol succinate (TOPROL-XL) XL tablet 25 mg  25 mg Oral DAILY    polyethylene glycol (MIRALAX) packet 17 g  17 g Oral DAILY PRN    guaiFENesin (ROBITUSSIN) 100 mg/5 mL oral liquid 100 mg  100 mg Oral Q4H PRN    hydrALAZINE (APRESOLINE) 20 mg/mL injection 20 mg  20 mg IntraVENous Q4H PRN    atorvastatin (LIPITOR) tablet 80 mg  80 mg Oral QHS    lisinopril (PRINIVIL, ZESTRIL) tablet 40 mg  40 mg Oral DAILY    sodium chloride (NS) flush 5-40 mL  5-40 mL IntraVENous PRN    ondansetron (ZOFRAN) injection 4 mg  4 mg IntraVENous Q4H PRN    aspirin chewable tablet 81 mg  81 mg Oral DAILY    enoxaparin (LOVENOX) injection 40 mg  40 mg SubCUTAneous Q24H    dextrose 40% (GLUTOSE) oral gel 1 Tube  15 g Oral PRN    glucagon (GLUCAGEN) injection 1 mg  1 mg IntraMUSCular PRN    dextrose (D50W) injection syrg 12.5-25 g  25-50 mL IntraVENous PRN       Other Studies (last 24 hours):  No results found.     Assessment and Plan:     Hospital Problems as of 2/23/2020 Date Reviewed: 3/3/2017          Codes Class Noted - Resolved POA    PFO (patent foramen ovale) ICD-10-CM: Q21.1  ICD-9-CM: 745.5  12/17/2019 - Present Yes        Arrhythmia ICD-10-CM: I49.9  ICD-9-CM: 427.9  12/15/2019 - Present Yes        Hyperlipemia ICD-10-CM: E78.5  ICD-9-CM: 272.4  12/15/2019 - Present Yes        * (Principal) Acute embolic stroke [facility]) [id number]: I63.9  ICD-9-CM: 434.11  12/12/2019 - Present Yes        Hypertension (Chronic) ICD-10-CM: I10  ICD-9-CM: 401.9  Unknown - Present Yes        Type 2 diabetes mellitus (HCC) (Chronic) ICD-10-CM: E11.9  ICD-9-CM: 250.00  Unknown - Present Yes              PLAN:    Acute embolic stroke  -is \"outpatient ready\" so no routine labs indicated at this point  -MRI with acute infarcts in L cerebellar hemisphere, deep frontoparietal white matter, and R paramedian yariel. old lacunar infarcts.    -ISMAEL: 3 mm PFO, Cardiology stated he needs an evaluation for closure PFO with high risk features including significant (3 mm) separation of septum secundum and primum as well as large shunt.  Will await recovery of CVA.  Plan is 4 week event monitor as outpatient.  Will then see in office and if no arrhythmias, will plan PFO closure  -continue statin, ASA     Hypertension:   -lisinopril, metoprolol     Type 2 diabetes mellitus:    -cont current     Left upper extremity pain  -titrate down tylenol from q6h to q8h (2/23)    DISPO: waiting for pt to be deemed \"disabled\" which in neurologic cases have to allow for 6 months showing chronicity.  Then he can get medicaid after that.   DVT ppx:  lovenox     Signed:  eDv Vera MD

## 2020-02-23 NOTE — PROGRESS NOTES
Problem: Patient Education: Go to Patient Education Activity  Goal: Patient/Family Education  Outcome: Progressing Towards Goal     Problem: Pressure Injury - Risk of  Goal: *Prevention of pressure injury  Description  Document Dewey Scale and appropriate interventions in the flowsheet. Outcome: Progressing Towards Goal  Note: Pressure Injury Interventions:  Sensory Interventions: Assess changes in LOC    Moisture Interventions: Absorbent underpads, Check for incontinence Q2 hours and as needed, Limit adult briefs, Minimize layers    Activity Interventions: Increase time out of bed, Pressure redistribution bed/mattress(bed type), PT/OT evaluation    Mobility Interventions: HOB 30 degrees or less, Pressure redistribution bed/mattress (bed type), PT/OT evaluation    Nutrition Interventions: Document food/fluid/supplement intake    Friction and Shear Interventions: HOB 30 degrees or less, Minimize layers                Problem: Patient Education: Go to Patient Education Activity  Goal: Patient/Family Education  Outcome: Progressing Towards Goal     Problem: Falls - Risk of  Goal: *Absence of Falls  Description  Document Jeanne Fall Risk and appropriate interventions in the flowsheet.   Outcome: Progressing Towards Goal  Note: Fall Risk Interventions:  Mobility Interventions: Communicate number of staff needed for ambulation/transfer, OT consult for ADLs, Patient to call before getting OOB, PT Consult for mobility concerns    Mentation Interventions: Adequate sleep, hydration, pain control, Bed/chair exit alarm, Door open when patient unattended    Medication Interventions: Patient to call before getting OOB, Teach patient to arise slowly    Elimination Interventions: Bed/chair exit alarm, Elevated toilet seat, Patient to call for help with toileting needs, Stay With Me (per policy), Toilet paper/wipes in reach    History of Falls Interventions: Bed/chair exit alarm, Door open when patient unattended, Investigate reason for fall         Problem: Patient Education: Go to Patient Education Activity  Goal: Patient/Family Education  Outcome: Progressing Towards Goal     Problem: Diabetes Self-Management  Goal: *Disease process and treatment process  Description  Define diabetes and identify own type of diabetes; list 3 options for treating diabetes. Outcome: Progressing Towards Goal  Goal: *Incorporating nutritional management into lifestyle  Description  Describe effect of type, amount and timing of food on blood glucose; list 3 methods for planning meals. Outcome: Progressing Towards Goal  Goal: *Incorporating physical activity into lifestyle  Description  State effect of exercise on blood glucose levels. Outcome: Progressing Towards Goal  Goal: *Developing strategies to promote health/change behavior  Description  Define the ABC's of diabetes; identify appropriate screenings, schedule and personal plan for screenings. Outcome: Progressing Towards Goal  Goal: *Using medications safely  Description  State effect of diabetes medications on diabetes; name diabetes medication taking, action and side effects. Outcome: Progressing Towards Goal  Goal: *Monitoring blood glucose, interpreting and using results  Description  Identify recommended blood glucose targets  and personal targets. Outcome: Progressing Towards Goal  Goal: *Prevention, detection, treatment of acute complications  Description  List symptoms of hyper- and hypoglycemia; describe how to treat low blood sugar and actions for lowering  high blood glucose level. Outcome: Progressing Towards Goal  Goal: *Prevention, detection and treatment of chronic complications  Description  Define the natural course of diabetes and describe the relationship of blood glucose levels to long term complications of diabetes.   Outcome: Progressing Towards Goal  Goal: *Developing strategies to address psychosocial issues  Description  Describe feelings about living with diabetes; identify support needed and support network  Outcome: Progressing Towards Goal     Problem: Patient Education: Go to Patient Education Activity  Goal: Patient/Family Education  Outcome: Progressing Towards Goal     Problem: Patient Education: Go to Patient Education Activity  Goal: Patient/Family Education  Outcome: Progressing Towards Goal     Problem: Nutrition Deficit  Goal: *Optimize nutritional status  Outcome: Progressing Towards Goal     Problem: Patient Education: Go to Patient Education Activity  Goal: Patient/Family Education  Outcome: Progressing Towards Goal     Problem: General Medical Care Plan  Goal: *Vital signs within specified parameters  Outcome: Progressing Towards Goal  Goal: *Labs within defined limits  Outcome: Progressing Towards Goal  Goal: *Absence of infection signs and symptoms  Description  Wash hand more often   Outcome: Progressing Towards Goal  Goal: *Optimal pain control at patient's stated goal  Outcome: Progressing Towards Goal  Goal: *Skin integrity maintained  Outcome: Progressing Towards Goal  Goal: *Fluid volume balance  Outcome: Progressing Towards Goal  Goal: *Optimize nutritional status  Outcome: Progressing Towards Goal  Goal: *Anxiety reduced or absent  Outcome: Progressing Towards Goal  Goal: *Progressive mobility and function (eg: ADL's)  Outcome: Progressing Towards Goal     Problem: Patient Education: Go to Patient Education Activity  Goal: Patient/Family Education  Outcome: Progressing Towards Goal     Problem: Patient Education: Go to Patient Education Activity  Goal: Patient/Family Education  Outcome: Progressing Towards Goal     Problem: Patient Education: Go to Patient Education Activity  Goal: Patient/Family Education  Outcome: Progressing Towards Goal     Problem: Patient Education: Go to Patient Education Activity  Goal: Patient/Family Education  Outcome: Progressing Towards Goal     Problem: Ischemic Stroke: Discharge Outcomes  Goal: *Verbalizes anxiety and depression are reduced or absent  Outcome: Progressing Towards Goal  Goal: *Verbalize understanding of risk factor modification(Stroke Metric)  Outcome: Progressing Towards Goal  Goal: *Hemodynamically stable  Outcome: Progressing Towards Goal  Goal: *Absence of aspiration pneumonia  Outcome: Progressing Towards Goal  Goal: *Aware of needed dietary changes  Outcome: Progressing Towards Goal  Goal: *Verbalize understanding of prescribed medications including anti-coagulants, anti-lipid, and/or anti-platelets(Stroke Metric)  Outcome: Progressing Towards Goal  Goal: *Tolerating diet  Outcome: Progressing Towards Goal  Goal: *Aware of follow-up diagnostics related to anticoagulants  Outcome: Progressing Towards Goal  Goal: *Ability to perform ADLs and demonstrates progressive mobility and function  Outcome: Progressing Towards Goal  Goal: *Absence of DVT(Stroke Metric)  Outcome: Progressing Towards Goal  Goal: *Absence of aspiration  Outcome: Progressing Towards Goal  Goal: *Optimal pain control at patient's stated goal  Outcome: Progressing Towards Goal  Goal: *Home safety concerns addressed  Outcome: Progressing Towards Goal  Goal: *Describes available resources and support systems  Outcome: Progressing Towards Goal  Goal: *Verbalizes understanding of activation of EMS(911) for stroke symptoms(Stroke Metric)  Outcome: Progressing Towards Goal  Goal: *Understands and describes signs and symptoms to report to providers(Stroke Metric)  Outcome: Progressing Towards Goal  Goal: *Neurolgocially stable (absence of additional neurological deficits)  Outcome: Progressing Towards Goal  Goal: *Verbalizes importance of follow-up with primary care physician(Stroke Metric)  Outcome: Progressing Towards Goal  Goal: *Smoking cessation discussed,if applicable(Stroke Metric)  Outcome: Progressing Towards Goal  Goal: *Depression screening completed(Stroke Metric)  Outcome: Progressing Towards Goal     Problem: Pain  Goal: *Control of Pain  Outcome: Progressing Towards Goal     Problem: Patient Education: Go to Patient Education Activity  Goal: Patient/Family Education  Outcome: Progressing Towards Goal     Problem: Patient Education: Go to Patient Education Activity  Goal: Patient/Family Education  Outcome: Progressing Towards Goal

## 2020-02-24 LAB — GLUCOSE BLD STRIP.AUTO-MCNC: 110 MG/DL (ref 65–100)

## 2020-02-24 PROCEDURE — 74011250637 HC RX REV CODE- 250/637: Performed by: INTERNAL MEDICINE

## 2020-02-24 PROCEDURE — 97530 THERAPEUTIC ACTIVITIES: CPT

## 2020-02-24 PROCEDURE — 92507 TX SP LANG VOICE COMM INDIV: CPT

## 2020-02-24 PROCEDURE — 74011250636 HC RX REV CODE- 250/636: Performed by: INTERNAL MEDICINE

## 2020-02-24 PROCEDURE — 97116 GAIT TRAINING THERAPY: CPT

## 2020-02-24 PROCEDURE — 82962 GLUCOSE BLOOD TEST: CPT

## 2020-02-24 PROCEDURE — 65270000029 HC RM PRIVATE

## 2020-02-24 PROCEDURE — 74011250637 HC RX REV CODE- 250/637: Performed by: HOSPITALIST

## 2020-02-24 PROCEDURE — 97112 NEUROMUSCULAR REEDUCATION: CPT

## 2020-02-24 RX ORDER — DIPHENHYDRAMINE HCL 25 MG
25 CAPSULE ORAL
Status: DISCONTINUED | OUTPATIENT
Start: 2020-02-24 | End: 2020-06-10 | Stop reason: HOSPADM

## 2020-02-24 RX ADMIN — DIPHENHYDRAMINE HYDROCHLORIDE 25 MG: 25 CAPSULE ORAL at 12:13

## 2020-02-24 RX ADMIN — ACETAMINOPHEN 650 MG: 325 TABLET, FILM COATED ORAL at 05:22

## 2020-02-24 RX ADMIN — ACETAMINOPHEN 650 MG: 325 TABLET, FILM COATED ORAL at 13:08

## 2020-02-24 RX ADMIN — GUAIFENESIN 100 MG: 100 SOLUTION ORAL at 16:04

## 2020-02-24 RX ADMIN — ENOXAPARIN SODIUM 40 MG: 40 INJECTION SUBCUTANEOUS at 13:08

## 2020-02-24 RX ADMIN — ATORVASTATIN CALCIUM 80 MG: 80 TABLET, FILM COATED ORAL at 21:41

## 2020-02-24 RX ADMIN — METFORMIN HYDROCHLORIDE 500 MG: 500 TABLET, FILM COATED ORAL at 08:13

## 2020-02-24 RX ADMIN — GUAIFENESIN 100 MG: 100 SOLUTION ORAL at 05:22

## 2020-02-24 RX ADMIN — METOPROLOL SUCCINATE 25 MG: 25 TABLET, FILM COATED, EXTENDED RELEASE ORAL at 08:13

## 2020-02-24 RX ADMIN — DIPHENHYDRAMINE HYDROCHLORIDE 25 MG: 25 CAPSULE ORAL at 21:41

## 2020-02-24 RX ADMIN — METFORMIN HYDROCHLORIDE 500 MG: 500 TABLET, FILM COATED ORAL at 16:04

## 2020-02-24 RX ADMIN — ASPIRIN 81 MG 81 MG: 81 TABLET ORAL at 08:13

## 2020-02-24 RX ADMIN — ACETAMINOPHEN 650 MG: 325 TABLET, FILM COATED ORAL at 21:41

## 2020-02-24 NOTE — PROGRESS NOTES
Neurolinguistic Goals:  LTG: Patient will increase neuro-linguistic abilities to increase safety and awareness in functional living environment   STG: Patient will solve mathematical problems with 80%accuracy given minimal cueing   STG: Patient will name 10 items to concrete category in 1 minute given minimal cueing. Progressing 1/31/2020  STG: Patient will participate in verbal reasoning tasks with 80% accuracy given minimal cueing  STG: Patient will complete mental manipulation tasks with 80% accuracy given minimal cueing  STG: Patient will complete working memory/attention tasks with 80% accuracy given minimal cueing  STG: Patient will recall relevant verbal information with 80% accuracy with minimal assistance  STG: Patient will utilize memory compensatory strategies to improve recall of functional list/message with 90%accuracy given minimal assistance  STG: Patient will participate in ongoing cognitive linguistic evaluation for therapeutic assessment. MET 1/31/2020     Dysarthria Goals:  LTG: Patient will develop functional and intelligible speech and utilize compensatory strategies to improve communication across environments  STG: Patient will utilize compensatory strategies at conversation level with 90% accuracy independently. Added 1/31/2020    SPEECH LANGUAGE PATHOLOGY: SPEECH-LANGUAGE/COGNITION: Daily Note 6    NAME/AGE/GENDER: Ryne Loera is a 53 y.o. male  DATE: 2/24/2020  PRIMARY DIAGNOSIS: Acute ischemic stroke ([facility] [address] [I63.9]  Cerebrovascular accident (CVA) due to embolism ([facility] [address] [I63.9]      ICD-10: Treatment Diagnosis: R47.1 Dysarthria and Anarthria  R41.841 Cognitive-Communication Deficit    INTERDISCIPLINARY COLLABORATION: n/a  PRECAUTIONS/ALLERGIES: Patient has no known allergies. SUBJECTIVE   Patient upright in bed for session. Problem List:  (Impairments causing functional limitations):  1. Dysarthria  2.  Cognitive linguistic impaired        Pain: Pain Scale 1: Numeric (0 - 10)  Pain Intensity 1: 0     OBJECTIVE   Patient completed the following therapeutic exercises targeting short term memory and reasoning:    Sequenced 5 and 6 written steps to complete ADLs: 20/22 given min verbal cues    Recalled details about 2-3 sentence paragraphs 3-4 components with 6/10 accurate given moderate verbal cues. ASSESSMENT   Patient continues to have marked impairment in regards to short term recall. Verbal cues minimally effective in improving recall this date. Continue to recommend patient write down novel information to assist with functional recall. Reasoning skills continue to improve with patient requiring only minimal cues with sequencing task. Patient currently functioning below baseline. Will continue to follow for cognitive linguistic treatment to improve functional independence/communication. Tool Used: MODIFIED BRITT SCALE (mRS)   Score   No Symptoms  [] 0   No significant disability despite symptoms; able to carry out all usual duties and activities  [] 1   Slight disability; unable to carry out all previous activities but able to look after own affairs without assistance. [x] 2   Moderate disability; requiring some help but able to walk without assistance  [] 3   Moderately severe disability; unable to walk without assistance and unable to attend to own bodily needs without assistance  [] 4   Severe disability; bedridden, incontinent, and requiring constant nursing care and attention  [] 5      Score:  Initial: 2 Most Recent: 2 (Date 02/11/20 )   Interpretation of Tool: The Modified [place] Scale is a 7-point scaled used to quantify level of disability as it relates to a patient's functional abilities. PLAN    FREQUENCY/DURATION: Continue to follow patient 2 times a week for duration of hospital stay to address above goals.   - Recommendations for next treatment session: Next treatment will address cognition   REHABILITATION POTENTIAL FOR STATED GOALS: Good           RECOMMENDATIONS   STRATEGIES:   · Memory strategies  · Dysarthria strategies     EDUCATION:  · Recommendations discussed with Patient and wife     RECOMMENDATIONS for CONTINUED SPEECH THERAPY: YES: Anticipate need for ongoing speech therapy during this hospitalization and at next level of care. Compliance with Program/Exercises: Will assess as treatment progresses  Continuation of Skilled Services/Medical Necessity:   Patient is expected to demonstrate progress in cognitive ability to decrease assistance required communication, increase independence with activities of daily living and increase communication with family/caregivers.  Patient continues to require skilled intervention due to impaired cognitive linguistic abilities.      SAFETY:  After treatment position/precautions:  · Upright in bed  · OT at bedside  · Call light within reach    Total Treatment Duration:   Time In: 1326  Time Out: [address], [id number] [facility]

## 2020-02-24 NOTE — PROGRESS NOTES
Problem: Mobility Impaired (Adult and Pediatric)  Goal: *Acute Goals and Plan of Care  Description  Acute PT Goals (reviewed & updated 2/12/2020)  STG:  (1.) Mr. Kyung Cisse will demonstrate good dynamic sitting balance within 3 treatment days. (2) Mr. Kyung Cisse will transfer sit to stand with [facility] within 3 treatment days. (3.)Mr. Seth Schmidt will transfer from bed to chair and chair to bed via [address] [facility] with MODERATE ASSIST x1 using the least restrictive device within 3 treatment day(s). (4.) Mr. Kyung Cisse will ambulate with MODERATE ASSIST for 25+ feet with the least restrictive device within 3 treatment day(s). (5.)Mr. Kyung Cisse will tolerate sitting up in chair/wheelchair x4 hours with 0/10 pain within 3 treatment days. LTG:  (1.) Mr. Kyung Cisse will perform bed mobility with CONTACT GUARD ASSISTANCE within 7 treatment day(s). (2.) Mr. Kyung Cisse will transfer from bed to chair and chair to bed via [facility] with MINIMAL ASSIST using the least restrictive device within 7 treatment day(s). (3.) Mr. Kyung Cisse will ambulate with MINIMAL ASSIST for 50+ feet with the least restrictive device within 7 treatment day(s). (4.)Mr. Kyung Cisse will demonstrate FAIR STATIC STANDING balance within 7 treatment days. MET GOALS:      PHYSICAL THERAPY: Daily Note and PM 2/24/2020  INPATIENT: PT Visit Days : 7  Payor: MEDICAID PENDING / Plan: [employer] PENDING / Product Type: Medicaid /       NAME/AGE/GENDER: Alanna Boss is a 53 y.o. male   PRIMARY DIAGNOSIS: Acute ischemic stroke ([address] [I63.9]  Cerebrovascular accident (CVA) due to embolism ([address] [C88.5] Acute embolic stroke ([address] Acute embolic stroke ([address]       ICD-10: Treatment Diagnosis:   · Difficulty in walking, Not elsewhere classified (R26.2)  · Hemiplegia and hemiparesis following cerebral infarction affecting left non-dominant side (W90.220)   Precaution/Allergies:  Patient has no known allergies.       ASSESSMENT:      Clovis Jordan is a 53year old admitted R MCA CVA with left hemiparesis. Pt was supine in bed at time of contact and agreeable to get up and work with therapy. He sat to the EOB with additional effort, time and eventually min a. He sat a couple min. Sling was put on left UE. He stood and amb 48' with hemiwalker and min a. He sat in w/c that was being pushed behind him and rested a few minutes. Twice more he stood again and amb another 48' as above. He experienced 3-4 LOB's requiring assistance to correct. Encouraged him to try to take a longer step with left and try to separate his feet some. He was pushed back to his room in the W/c. He stood from w/c and transferred to bed with min a He performed LAQ's passisve on L, A on R. .EOB to supine with min a. Overall, slow, steady  progress towards physical therapy goals. Goals listed above are still appropriate. Will continue efforts as patient is still below functional baseline. At this time, patient is appropriate for Co-treatment with occupational therapy due to patient's decreased overall endurance/tolerance levels, as well as need for high level skilled assistance to complete functional transfers/mobility and functional tasks. Kath Huizar is appropriate for a multidisciplinary co-treatment of PT and OT to address goals of both disciplines. This section established at most recent assessment   PROBLEM LIST (Impairments causing functional limitations):  1. Decreased Strength  2. Decreased ADL/Functional Activities  3. Decreased Transfer Abilities  4. Decreased Ambulation Ability/Technique  5. Decreased Balance  6. Increased Pain  7. Decreased Activity Tolerance  8. Increased Fatigue  9. Decreased Flexibility/Joint Mobility   INTERVENTIONS PLANNED: (Benefits and precautions of physical therapy have been discussed with the patient.)  1. Balance Exercise  2. Bed Mobility  3. Family Education  4. Gait Training  5. Home Exercise Program (HEP)  6.  Manual Therapy  7. Neuromuscular Re-education/Strengthening  8. Range of Motion (ROM)  9. Therapeutic Activites  10. Therapeutic Exercise/Strengthening  11. Transfer Training     TREATMENT PLAN: Frequency/Duration: 3 times a week for duration of hospital stay  Rehabilitation Potential For Stated Goals: Good     REHAB RECOMMENDATIONS (at time of discharge pending progress):    Placement: It is my opinion, based on this patient's performance to date, that Mr. Karlos Alvarenga may benefit from intensive therapy at an [address] [place] [address] after discharge due to a probable need for close medical supervision by a rehab physician, a probable need for multiple therapy disciplines and potential to make ongoing and sustainable functional improvement that is of practical value. .  Equipment:    TBD pending progress with therapy. HISTORY:   History of Present Injury/Illness (Reason for Referral):  Per H&P: \"Pt is a 52 y/o smoker with DM, HTN, who presented to ER with L leg and arm weakness, L facial droop, dysarthria. First noted L leg weakness late night 12/11 when he woke up to go to the bathroom. He was normal when he went to bed around 1030. Woke up this morning and had persistent weakness L leg and also now noted in L arm. EMS called. Noted to have slurred speech and L facial droop as well. Code S called in ER around 9am.  MRI with acute infarcts in L cerebellar hemisphere, deep frontoparietal white matter, and R paramedian yariel. Also noted old lacunar infarcts. No large vessel occlusion on CTA, but some stenosis noted. No hx afib, TIA, CVA. No CP, palpitations, SOB. \"  Past Medical History/Comorbidities:   Mr. Karlos Alvarenga  has a past medical history of Acute ischemic stroke ([address] (12/12/2019), Acute pancreatitis (11/19/2014), Cerebrovascular accident (CVA) due to embolism ([address] (12/12/2019), Diabetes ([address] (2002), Diabetes ([address] Diabetes mellitus, and Hypertension.  He also has no past medical history of Arthritis, Asthma, Autoimmune disease ([facility] CAD (coronary artery disease), Cancer ([facility] Chronic kidney disease, COPD, Dementia, Dementia ([facility] Heart failure ([facility] Ill-defined condition, Infectious disease, Liver disease, Other ill-defined conditions(799.89), Psychiatric disorder, PUD (peptic ulcer disease), Seizures ([facility] or Sleep disorder. Mr. Kyung Cisse  has a past surgical history that includes hx hernia repair and hx orthopaedic. Social History/Living Environment:   Home Environment: Apartment  # Steps to Enter: 12  Rails to Enter: Yes  [employer] : Bilateral  One/Two Story Residence: One story  Living Alone: No  Support Systems: Spouse/Significant Other/Partner  Patient Expects to be Discharged to[de-identified] Unknown  Current DME Used/Available at Home: None  Tub or Shower Type: Tub/Shower combination  Prior Level of Function/Work/Activity:  Independent, lives with wife in 2nd story 1 level apartment. No recent falls. Number of Personal Factors/Comorbidities that affect the Plan of Care: 1-2: MODERATE COMPLEXITY   EXAMINATION:   Most Recent Physical Functioning:   Gross Assessment:                  Posture:     Balance:  Sitting - Static: Good (unsupported)  Sitting - Dynamic: Fair (occasional)(+)  Standing - Static: Fair  Standing - Dynamic : Poor Bed Mobility:  Rolling: Minimum assistance  Supine to Sit: Minimum assistance  Sit to Supine: Minimum assistance  Scooting: Stand-by assistance  Wheelchair Mobility:     Transfers:  Sit to Stand: Contact guard assistance  Stand to Sit: Minimum assistance  Bed to Chair: Minimum assistance  Gait:     Base of Support: Center of gravity altered;Narrowed  Speed/Clotilde: Delayed; Shuffled; Slow  Step Length: Left shortened;Right shortened  Gait Abnormalities: Hemiplegic; Shuffling gait  Distance (ft): 50 Feet (ft)(x 3 with seated rest)  Assistive Device: Gait belt;Walker luke  Ambulation - Level of Assistance: Minimal assistance; Moderate assistance  Interventions: Manual cues;Safety awareness training;Verbal cues      Body Structures Involved:  1. Nerves  2. Voice/Speech  3. Bones  4. Joints  5. Muscles Body Functions Affected:  1. Mental  2. Sensory/Pain  3. Neuromusculoskeletal  4. Movement Related Activities and Participation Affected:  1. General Tasks and Demands  2. Mobility  3. Self Care  4. Interpersonal Interactions and Relationships   Number of elements that affect the Plan of Care: 4+: HIGH COMPLEXITY   CLINICAL PRESENTATION:   Presentation: Evolving clinical presentation with changing clinical characteristics: MODERATE COMPLEXITY   CLINICAL DECISION MAKIN [facility] Inpatient Short Form  How much difficulty does the patient currently have. .. Unable A Lot A Little None   1. Turning over in bed (including adjusting bedclothes, sheets and blankets)? [] 1   [] 2   [x] 3   [] 4   2. Sitting down on and standing up from a chair with arms ( e.g., wheelchair, bedside commode, etc.)   [] 1   [x] 2   [] 3   [] 4   3. Moving from lying on back to sitting on the side of the bed? [] 1   [] 2   [x] 3   [] 4   How much help from another person does the patient currently need. .. Total A Lot A Little None   4. Moving to and from a bed to a chair (including a wheelchair)? [] 1   [x] 2   [] 3   [] 4   5. Need to walk in hospital room? [] 1   [x] 2   [] 3   [] 4   6. Climbing 3-5 steps with a railing? [] 1   [x] 2   [] 3   [] 4   © , Trustees of [address] [place] [address] [address] [id number], under license to [employer]. All rights reserved      Score:  Initial: 13 Most Recent: 14 (Date:2020)    Interpretation of Tool:  Represents activities that are increasingly more difficult (i.e. Bed mobility, Transfers, Gait). Medical Necessity:     · Patient is expected to demonstrate progress in   · strength, range of motion, balance, coordination, and functional technique  ·  to   · increase independence with all mobility.    · .  Reason for Services/Other Comments:  · Patient continues to require skilled intervention due to   · medical complications and mobility deficits which impact his level of function, safety, and independence as indicated above. · .   Use of outcome tool(s) and clinical judgement create a POC that gives a: Questionable prediction of patient's progress: MODERATE COMPLEXITY        TREATMENT:      Pre-treatment Symptoms/Complaints:  none  Pain: Initial: None Post Session:  None     Gait Training (  30 Minutes):  Gait training to improve and/or restore physical functioning as related to mobility, strength and balance. Ambulated 50 Feet (ft)(x 3 with seated rest) with Minimal assistance; Moderate assistance using a Gait belt;Walker luke and moderate Manual cues; Safety awareness training;Verbal cues related to their sequencing, knee positioning, foot placement, step length, weight shifts, luke walker placement, and posture to promote proper body alignment, promote proper body posture and promote proper body mechanics. Instruction in performance of the above deficits to correct overall gait sequencing and quality. Therapeutic Activity: (    12 Minutes): Therapeutic activities including bed mobility training, transfers from various surfaces, static/dynamic sitting/standing balance, posture training, scooting and patient education to improve mobility, strength and balance. Required moderate Manual cues; Safety awareness training;Verbal cues to promote static and dynamic balance in standing and promote coordination of bilateral, lower extremity(s).          Braces/Orthotics/Lines/Etc:   · Sling to L UE throughout transfers and ambulation  Treatment/Session Assessment:    · Response to Treatment:  See above  · Interdisciplinary Collaboration:   o Physical Therapy Assistant  o Registered Nurse  o Rehabilitation Attendant  · After treatment position/precautions:   o Supine in bed  o Bed alarm/tab alert on  o Bed/Chair-wheels locked  o Bed in low position  o Call light within reach  o RN notified   · Compliance with Program/Exercises: Compliant all of the time  · Recommendations/Intent for next treatment session: \"Next visit will focus on advancements to more challenging activities and reduction in assistance provided\".     Total Treatment Duration:  PT Patient Time In/Time Out  Time In: 1033  Time Out: Joshua Agudelo, PTA

## 2020-02-24 NOTE — PROGRESS NOTES
Problem: Self Care Deficits Care Plan (Adult)  Goal: *Acute Goals and Plan of Care (Insert Text)  Description  GOALS UPDATED 1/22/2020:   1. Patient will complete dynamic sitting balance for ADLs with supervision. PROGRESSING 2/9/2020 (Progressing, 2/24/2020)  2. Patient will demonstrate appropriate safety awareness and protection of L UE during bed mobility and functional transfers with minimal cues. (Progressing) (Progressing, 2/24/2020)  3. Patient will complete total body bathing and dressing with moderate assistance and adaptive equipment as needed. ( [place] 2/9/2020 (Progressing, 2/24/2020)  4. Patient will complete weightbearing into the L UE with ADL tasks with minimal assistance to improve ability to use as a functional assist during ADL tasks. (Progressing) (Progressing, 2/24/2020)  5.5. Patient will demonstrate L UE SROM HEP within 7 days. 6. Patient will complete bed mobility with stand by assistance and adaptive equipment as needed in preparation for functional transfers. PROGRESSING 2/9/2020  7. 7. Patient will complete functional transfers with minimal assistance with equipment as needed. (New goal)8. (Goal Met)    Timeframe: 7 visits       Outcome: Progressing Towards Goal     OCCUPATIONAL THERAPY: Daily Note and PM    2/24/2020  INPATIENT: OT Visit Days: 5  Payor: MEDICAID PENDING / Plan: Keri Rizvi PENDING / Product Type: Medicaid /      NAME/AGE/GENDER: Cristina Enriquez is a 53 y.o. male   PRIMARY DIAGNOSIS:  Acute ischemic stroke ([address] [I63.9]  Cerebrovascular accident (CVA) due to embolism ([address] [D73.2] Acute embolic stroke ([address] Acute embolic stroke ([address]       ICD-10: Treatment Diagnosis:    · Generalized Muscle Weakness (M62.81)  · Other lack of cordination (R27.8)  · Hemiplegia and hemiparesis following cerebral infarction affecting   · left non-dominant side (I69.354)  · Abnormal posture (R29.3)   Precautions/Allergies:    NO PULLING ON LUE   LUE in sling with shoulder joint approximated and supported   Patient has no known allergies. ASSESSMENT:     Mr. Wendy Fischer presents supine in bed upon arrival. Pt transferred to sitting with mod a and additional time. KT applied to L shoulder; I strip to L Upper trapezius mm, X strip to L rhomboids, and I wrap around strip for support, proprioception, & to decrease pain. Worked on L UE weight bearing with weight shift sitting on edge of bed. Patient required moderate assist to squat and scoot to head of bed. Patient returned to supine with moderate assistance. Worked on L UE WB'g supine with L UE in plane of scapula. Pt tolerated more weight via L wrist without pain this date. Trace mvt noted in L Upper Trapezius, minimal active movement in L biceps in gravity supported position via partial range, L pronation mvt noted, L wrist flex/ext noted via partial range, and L digital mvt noted when patient made a partial fist, though patient unable extend L digits. Reiterated SROM exercises to patient and he performed exercises with min to moderate physical assist and verbal cues. New mvt in L biceps noted today. Slow, steady progress towards goals. Cont OT per tx plan. This section established at most recent assessment   PROBLEM LIST (Impairments causing functional limitations):  1. Decreased Strength  2. Decreased ADL/Functional Activities  3. Decreased Transfer Abilities  4. Decreased Ambulation Ability/Technique  5. Decreased Balance  6. Decreased Activity Tolerance  7. Increased Fatigue  8. Decreased Flexibility/Joint Mobility  9. Decreased Knowledge of Precautions  10. Decreased [place] with Home Exercise Program   INTERVENTIONS PLANNED: (Benefits and precautions of occupational therapy have been discussed with the patient.)  1. Activities of daily living training  2. Adaptive equipment training  3. Balance training  4. Clothing management  5. Cognitive training  6. Donning&doffing training  7.  Keanu tech training  8. Neuromuscular re-eduation  9. Therapeutic activity  10. Therapeutic exercise     TREATMENT PLAN: Frequency/Duration: Follow patient 3 times per week to address above goals. Rehabilitation Potential For Stated Goals: Excellent     REHAB RECOMMENDATIONS (at time of discharge pending progress):    Placement: It is my opinion, based on this patient's performance to date, that Mr. Romina Orlando may benefit from intensive therapy at an [address] [place] [address] after discharge due to potential to make ongoing and sustainable functional improvement that is of practical value. Shirley Ask Pt functioning far below independent baseline, demonstrating good improvement and participation. Pt would likely benefit greatly and increase independence from inpatient rehab stay. Equipment:    [employer]               OCCUPATIONAL PROFILE AND HISTORY:   History of Present Injury/Illness (Reason for Referral):  See H&P  Past Medical History/Comorbidities:   Mr. Romina Orlando  has a past medical history of Acute ischemic stroke ([address] (12/12/2019), Acute pancreatitis (11/19/2014), Cerebrovascular accident (CVA) due to embolism ([address] (12/12/2019), Diabetes ([address] (2002), Diabetes ([address] Diabetes mellitus, and Hypertension. He also has no past medical history of Arthritis, Asthma, Autoimmune disease ([address] CAD (coronary artery disease), Cancer ([address] Chronic kidney disease, COPD, Dementia, Dementia ([address] Heart failure ([address] Ill-defined condition, Infectious disease, Liver disease, Other ill-defined conditions(799.89), Psychiatric disorder, PUD (peptic ulcer disease), Seizures ([address] or Sleep disorder. Mr. Romina Orlando  has a past surgical history that includes hx hernia repair and hx orthopaedic.   Social History/Living Environment:   Home Environment: Apartment  # Steps to Enter: 12  Rails to Enter: Yes  [employer] : Bilateral  One/Two Story Residence: One story  Living Alone: No  Support Systems: Spouse/Significant Other/Partner  Patient Expects to be Discharged to[de-identified] Unknown  Current DME Used/Available at Home: None  Tub or Shower Type: Tub/Shower combination  Prior Level of Function/Work/Activity:  Pt lives at home with his wife. Pt is typically independent with ADL/functional mobility. Pt does not drive. Pt was working part-time at [employer]. Personal Factors:          Age:  53 y.o. Past/Current Experience:  CVA with flaccid L side        Other factors that influence how disability is experienced by the patient:  multiple co-morbidities    Number of Personal Factors/Comorbidities that affect the Plan of Care: Extensive review of physical, cognitive, and psychosocial performance (3+):  HIGH COMPLEXITY   ASSESSMENT OF OCCUPATIONAL PERFORMANCE[de-identified]   Activities of Daily Living:   Basic ADLs (From Assessment) Complex ADLs (From Assessment)   Feeding: Setup  Oral Facial Hygiene/Grooming: Moderate assistance  Bathing: Moderate assistance  Upper Body Dressing: Maximum assistance  Lower Body Dressing: Total assistance  Toileting: Total assistance Instrumental ADL  Meal Preparation: Total assistance  Homemaking: Total assistance   Grooming/Bathing/Dressing Activities of Daily Living         Upper Body Bathing  Bathing Assistance:  Moderate assistance (minimal for bathing LUE)  Position Performed: Seated edge of bed                   Bed/Mat Mobility  Rolling: Minimum assistance  Supine to Sit: Minimum assistance  Sit to Supine: Minimum assistance  Sit to Stand: Contact guard assistance  Stand to Sit: Minimum assistance  Bed to Chair: Minimum assistance  Scooting: Stand-by assistance     Most Recent Physical Functioning:   Gross Assessment:                  Posture:     Balance:  Sitting - Static: Good (unsupported)  Sitting - Dynamic: Fair (occasional)(+)  Standing - Static: Fair  Standing - Dynamic : Poor Bed Mobility:  Rolling: Minimum assistance  Supine to Sit: Minimum assistance  Sit to Supine: Minimum assistance  Scooting: Stand-by assistance  Wheelchair Mobility:     Transfers:  Sit to Stand: Contact guard assistance  Stand to Sit: Minimum assistance  Bed to Chair: Minimum assistance            Patient Vitals for the past 6 hrs:   BP BP Patient Position SpO2 Pulse   20 1128 119/85 At rest 96 % 75       Mental Status  Neurologic State: Alert  Orientation Level: Oriented X4  Cognition: Follows commands  Perception: Cues to maintain midline in sitting, Cues to maintain midline in standing(pt fatigued this afternoon)  Perseveration: No perseveration noted  Safety/Judgement: Awareness of environment, Fall prevention, Decreased insight into deficits                          Physical Skills Involved:  1. Range of Motion  2. Balance  3. Strength  4. Activity Tolerance  5. Fine Motor Control  6. Gross Motor Control Cognitive Skills Affected (resulting in the inability to perform in a timely and safe manner):  1. Perception  2. Expression Psychosocial Skills Affected:  1. Habits/Routines  2. Environmental Adaptation  3. Social Interaction  4. Emotional Regulation  5. Self-Awareness  6. Awareness of Others  7. Social Roles   Number of elements that affect the Plan of Care: 5+:  HIGH COMPLEXITY   CLINICAL DECISION MAKIN[address] [place] [id number] AM-PAC 6 Clicks   Daily Activity Inpatient Short Form  How much help from another person does the patient currently need. .. Total A Lot A Little None   1. Putting on and taking off regular lower body clothing? [x] 1   [] 2   [] 3   [] 4   2. Bathing (including washing, rinsing, drying)? [] 1   [x] 2   [] 3   [] 4   3. Toileting, which includes using toilet, bedpan or urinal?   [] 1   [x] 2   [] 3   [] 4   4. Putting on and taking off regular upper body clothing? [] 1   [x] 2   [] 3   [] 4   5. Taking care of personal grooming such as brushing teeth? [] 1   [x] 2   [] 3   [] 4   6. Eating meals? [] 1   [] 2   [x] 3   [] 4   © , Trustees of [address] [place] [address] [address] [address], under license to [employer]. All rights reserved      Score:  Initial: 11 Most Recent: 12 (Date: 1/22/2020 )    Interpretation of Tool:  Represents activities that are increasingly more difficult (i.e. Bed mobility, Transfers, Gait). Medical Necessity:     · Patient demonstrates   · good and excellent  ·  rehab potential due to higher previous functional level. Reason for Services/Other Comments:  · Patient continues to require skilled intervention due to   · Decreased independence with ADL/functional transfers that impacts overall quality of life. · .   Use of outcome tool(s) and clinical judgement create a POC that gives a: MODERATE COMPLEXITY         TREATMENT:   (In addition to Assessment/Re-Assessment sessions the following treatments were rendered)     Pre-treatment Symptoms/Complaints:    Pain: Initial:   Pain Intensity 1: 0  Post Session:  0     Neuromuscular Re-education: (45 minutes):  Exercise/activities per grid below to improve balance, coordination, kinesthetic sense, posture and proprioception. Required moderate visual, verbal, manual and tactile cues to promote static and dynamic balance in standing.    LUE Re-Education  Other Activities: PROM all planes; lateral WBing; weight shifting and balance retraining           Date:  1/27/2020 Date:  2/11/2020 Date:   2/21/2020 2/24/2020      Activity/Exercise Parameters Parameters Parameters    LUE finger flexion, AROM gravity minimized then AAROM to complete ROM 2x10 reps 2x10 reps 3 x's 10 reps 3 x's 10 reps   LUE finger extension AAROM 2x10 reps 2x10 reps 3 x's 10 reps 3 x's 10 reps   LUE wrist extension, AROM gravity minimized then AAROM to complete ROM 1x10 reps 1x10 reps 3 x's 10 reps 3 x's 10 reps   LUE wrist extension, AROM against gravity  1x10 reps 1x10 reps 3 x's 10 reps 3 x's 10 reps   LUE wrist flexion AAROM 2x10 reps 1x10 reps 3 x's 10 reps 3 x's 10 reps   LUE elbow flexion/extension PROM x10 reps 1x10 reps 3 x's 10 reps 3 x's 10 reps   LUE shoulder flexion PROM x10 reps 1x10 reps 3 x's 10 reps 3 x's 10 reps        Braces/Orthotics/Lines/Etc:   · LUE sling   · O2 device: Room air  · Treatment/Session Assessment:    · Response to Treatment:  Pt demonstrated good participation. · Interdisciplinary Collaboration:   o Certified Occupational Therapy Assistant  o Registered Nurse  · After treatment position/precautions:   o Supine in bed  o Bed/Chair-wheels locked  o Bed in low position   · Compliance with Program/Exercises: Compliant all of the time, Will assess as treatment progresses. · Recommendations/Intent for next treatment session: \"Next visit will focus on advancements to more challenging activities and reduction in assistance provided\".   Total Treatment Duration:  OT Patient Time In/Time Out  Time In: 1345  Time Out: [facility]

## 2020-02-24 NOTE — PROGRESS NOTES
Problem: Patient Education: Go to Patient Education Activity  Goal: Patient/Family Education  Outcome: Progressing Towards Goal     Problem: Pressure Injury - Risk of  Goal: *Prevention of pressure injury  Description  Document Dewey Scale and appropriate interventions in the flowsheet. Outcome: Progressing Towards Goal  Note: Pressure Injury Interventions:  Sensory Interventions: Assess changes in LOC    Moisture Interventions: Absorbent underpads, Check for incontinence Q2 hours and as needed, Limit adult briefs, Minimize layers    Activity Interventions: Increase time out of bed, Pressure redistribution bed/mattress(bed type), PT/OT evaluation    Mobility Interventions: HOB 30 degrees or less, Pressure redistribution bed/mattress (bed type), PT/OT evaluation    Nutrition Interventions: Document food/fluid/supplement intake    Friction and Shear Interventions: HOB 30 degrees or less, Minimize layers                Problem: Patient Education: Go to Patient Education Activity  Goal: Patient/Family Education  Outcome: Progressing Towards Goal     Problem: Falls - Risk of  Goal: *Absence of Falls  Description  Document Jeanne Fall Risk and appropriate interventions in the flowsheet.   Outcome: Progressing Towards Goal  Note: Fall Risk Interventions:  Mobility Interventions: Bed/chair exit alarm, Communicate number of staff needed for ambulation/transfer, OT consult for ADLs, Patient to call before getting OOB, PT Consult for mobility concerns    Mentation Interventions: Bed/chair exit alarm, Door open when patient unattended    Medication Interventions: Bed/chair exit alarm, Patient to call before getting OOB, Teach patient to arise slowly    Elimination Interventions: Bed/chair exit alarm, Call light in reach, Patient to call for help with toileting needs, Stay With Me (per policy), Toilet paper/wipes in reach, Toileting schedule/hourly rounds    History of Falls Interventions: Bed/chair exit alarm, Door open when patient unattended, Investigate reason for fall         Problem: Patient Education: Go to Patient Education Activity  Goal: Patient/Family Education  Outcome: Progressing Towards Goal     Problem: Diabetes Self-Management  Goal: *Disease process and treatment process  Description  Define diabetes and identify own type of diabetes; list 3 options for treating diabetes. Outcome: Progressing Towards Goal  Goal: *Incorporating nutritional management into lifestyle  Description  Describe effect of type, amount and timing of food on blood glucose; list 3 methods for planning meals. Outcome: Progressing Towards Goal  Goal: *Incorporating physical activity into lifestyle  Description  State effect of exercise on blood glucose levels. Outcome: Progressing Towards Goal  Goal: *Developing strategies to promote health/change behavior  Description  Define the ABC's of diabetes; identify appropriate screenings, schedule and personal plan for screenings. Outcome: Progressing Towards Goal  Goal: *Using medications safely  Description  State effect of diabetes medications on diabetes; name diabetes medication taking, action and side effects. Outcome: Progressing Towards Goal  Goal: *Monitoring blood glucose, interpreting and using results  Description  Identify recommended blood glucose targets  and personal targets. Outcome: Progressing Towards Goal  Goal: *Prevention, detection, treatment of acute complications  Description  List symptoms of hyper- and hypoglycemia; describe how to treat low blood sugar and actions for lowering  high blood glucose level. Outcome: Progressing Towards Goal  Goal: *Prevention, detection and treatment of chronic complications  Description  Define the natural course of diabetes and describe the relationship of blood glucose levels to long term complications of diabetes.   Outcome: Progressing Towards Goal  Goal: *Developing strategies to address psychosocial issues  Description  Describe feelings about living with diabetes; identify support needed and support network  Outcome: Progressing Towards Goal     Problem: Patient Education: Go to Patient Education Activity  Goal: Patient/Family Education  Outcome: Progressing Towards Goal     Problem: Patient Education: Go to Patient Education Activity  Goal: Patient/Family Education  Outcome: Progressing Towards Goal     Problem: Nutrition Deficit  Goal: *Optimize nutritional status  Outcome: Progressing Towards Goal     Problem: Patient Education: Go to Patient Education Activity  Goal: Patient/Family Education  Outcome: Progressing Towards Goal     Problem: General Medical Care Plan  Goal: *Vital signs within specified parameters  Outcome: Progressing Towards Goal  Goal: *Labs within defined limits  Outcome: Progressing Towards Goal  Goal: *Absence of infection signs and symptoms  Description  Wash hand more often   Outcome: Progressing Towards Goal  Goal: *Optimal pain control at patient's stated goal  Outcome: Progressing Towards Goal  Goal: *Skin integrity maintained  Outcome: Progressing Towards Goal  Goal: *Fluid volume balance  Outcome: Progressing Towards Goal  Goal: *Optimize nutritional status  Outcome: Progressing Towards Goal  Goal: *Anxiety reduced or absent  Outcome: Progressing Towards Goal  Goal: *Progressive mobility and function (eg: ADL's)  Outcome: Progressing Towards Goal     Problem: Patient Education: Go to Patient Education Activity  Goal: Patient/Family Education  Outcome: Progressing Towards Goal     Problem: Patient Education: Go to Patient Education Activity  Goal: Patient/Family Education  Outcome: Progressing Towards Goal     Problem: Patient Education: Go to Patient Education Activity  Goal: Patient/Family Education  Outcome: Progressing Towards Goal     Problem: Patient Education: Go to Patient Education Activity  Goal: Patient/Family Education  Outcome: Progressing Towards Goal     Problem: Ischemic Stroke: Discharge Outcomes  Goal: *Verbalizes anxiety and depression are reduced or absent  Outcome: Progressing Towards Goal  Goal: *Verbalize understanding of risk factor modification(Stroke Metric)  Outcome: Progressing Towards Goal  Goal: *Hemodynamically stable  Outcome: Progressing Towards Goal  Goal: *Absence of aspiration pneumonia  Outcome: Progressing Towards Goal  Goal: *Aware of needed dietary changes  Outcome: Progressing Towards Goal  Goal: *Verbalize understanding of prescribed medications including anti-coagulants, anti-lipid, and/or anti-platelets(Stroke Metric)  Outcome: Progressing Towards Goal  Goal: *Tolerating diet  Outcome: Progressing Towards Goal  Goal: *Aware of follow-up diagnostics related to anticoagulants  Outcome: Progressing Towards Goal  Goal: *Ability to perform ADLs and demonstrates progressive mobility and function  Outcome: Progressing Towards Goal  Goal: *Absence of DVT(Stroke Metric)  Outcome: Progressing Towards Goal  Goal: *Absence of aspiration  Outcome: Progressing Towards Goal  Goal: *Optimal pain control at patient's stated goal  Outcome: Progressing Towards Goal  Goal: *Home safety concerns addressed  Outcome: Progressing Towards Goal  Goal: *Describes available resources and support systems  Outcome: Progressing Towards Goal  Goal: *Verbalizes understanding of activation of EMS(911) for stroke symptoms(Stroke Metric)  Outcome: Progressing Towards Goal  Goal: *Understands and describes signs and symptoms to report to providers(Stroke Metric)  Outcome: Progressing Towards Goal  Goal: *Neurolgocially stable (absence of additional neurological deficits)  Outcome: Progressing Towards Goal  Goal: *Verbalizes importance of follow-up with primary care physician(Stroke Metric)  Outcome: Progressing Towards Goal  Goal: *Smoking cessation discussed,if applicable(Stroke Metric)  Outcome: Progressing Towards Goal  Goal: *Depression screening completed(Stroke Metric)  Outcome: Progressing Towards Goal     Problem: Pain  Goal: *Control of Pain  Outcome: Progressing Towards Goal     Problem: Patient Education: Go to Patient Education Activity  Goal: Patient/Family Education  Outcome: Progressing Towards Goal     Problem: Patient Education: Go to Patient Education Activity  Goal: Patient/Family Education  Outcome: Progressing Towards Goal

## 2020-02-24 NOTE — PROGRESS NOTES
Hospitalist Progress Note     Admit Date:  2019  9:07 AM   Name:  Jonathon Moss   Age:  53 y.o.  :  1975   MRN:  [id number]   PCP:  Joy Cueva MD  Treatment Team: Attending Provider: Natalie Rangel MD; Care Manager: Gaby Hernandez RN; Care Manager: Neena Zapata, ROB; Utilization Review: Jomar Lobato RN; Nurse Practitioner: Marla Tatum NP; Physical Therapy Assistant: Macy Hussein; Occupational Therapist: Marta Sykes OT; Speech Language Pathologist: Kirk Herrera, HYACINTH    Subjective:   52 yo male with PMH of DM II, HTN who presented 19 with c/o left leg and arm weakness, left facial droop and dysarthria.  Code S called. MRI with acute infarctions in left cerebellar hemisphere, deep frontoparietal white matter and right paramedian yariel.  Also noted to have old lacunar infarcts. CTA without large vessel occlusions, however some stenosis noted.  Echo showed PFO, Cardiology to f/u outpatient for evaluation for closure.  Neurology consulted.  Started on ASA, statin.  Pt is uninsured, difficult placement, case management working on plan.      patient seen and examined at bedside. No overnight events. Complaining of allergies. claritin offered but he wanted benadryl. Benadryl ordered. Nursing notes and chart reviewed. Review of Systems negative with exception of pertinent positives noted above.       Objective:     Patient Vitals for the past 24 hrs:   Temp Pulse Resp BP SpO2   20 1128 97.3 °F (36.3 °C) 75 18 119/85 96 %   20 0813  68  117/79    20 0709 98.2 °F (36.8 °C) 68 18 119/79 97 %   20 0400 97.9 °F (36.6 °C) 74 18 (!) 150/91 98 %   20 0000 98.4 °F (36.9 °C) 72 18 145/87 97 %   20 2000 98 °F (36.7 °C) 74 18 139/86 97 %   20 1553 98.2 °F (36.8 °C) 67 18 137/87 98 %     Oxygen Therapy  O2 Sat (%): 96 % (20 1128)  Pulse via Oximetry: 66 beats per minute (20 0000)  O2 Device: Room air (02/01/20 0000)  No intake or output data in the 24 hours ending 02/24/20 1257      General:    Well nourished. Alert. LUE unchanged from previous exam  CV:   RRR. No murmur, rub, or gallop. Lungs:   CTAB. No wheezing, rhonchi, or rales. Abdomen:   Soft, nontender, nondistended. Extremities: Warm and dry. No cyanosis or edema. Skin:     No rashes or jaundice. Data Review:  I have reviewed all labs, meds, telemetry events, and studies from the last 24 hours.     Recent Results (from the past 24 hour(s))   GLUCOSE, POC    Collection Time: 02/24/20  7:17 AM   Result Value Ref Range    Glucose (POC) 110 (H) 65 - 100 mg/dL        All Micro Results     None          Current Meds:  Current Facility-Administered Medications   Medication Dose Route Frequency    diphenhydrAMINE (BENADRYL) capsule 25 mg  25 mg Oral Q6H PRN    acetaminophen (TYLENOL) tablet 650 mg  650 mg Oral Q8H    lip protectant (BLISTEX) ointment 1 Each  1 Each Topical PRN    metFORMIN (GLUCOPHAGE) tablet 500 mg  500 mg Oral BID WITH MEALS    metoprolol succinate (TOPROL-XL) XL tablet 25 mg  25 mg Oral DAILY    polyethylene glycol (MIRALAX) packet 17 g  17 g Oral DAILY PRN    guaiFENesin (ROBITUSSIN) 100 mg/5 mL oral liquid 100 mg  100 mg Oral Q4H PRN    hydrALAZINE (APRESOLINE) 20 mg/mL injection 20 mg  20 mg IntraVENous Q4H PRN    atorvastatin (LIPITOR) tablet 80 mg  80 mg Oral QHS    lisinopril (PRINIVIL, ZESTRIL) tablet 40 mg  40 mg Oral DAILY    sodium chloride (NS) flush 5-40 mL  5-40 mL IntraVENous PRN    ondansetron (ZOFRAN) injection 4 mg  4 mg IntraVENous Q4H PRN    aspirin chewable tablet 81 mg  81 mg Oral DAILY    enoxaparin (LOVENOX) injection 40 mg  40 mg SubCUTAneous Q24H    dextrose 40% (GLUTOSE) oral gel 1 Tube  15 g Oral PRN    glucagon (GLUCAGEN) injection 1 mg  1 mg IntraMUSCular PRN    dextrose (D50W) injection syrg 12.5-25 g  25-50 mL IntraVENous PRN       Other Studies (last 24 hours):  No results found.    Assessment and Plan:     Hospital Problems as of 2/24/2020 Date Reviewed: 3/3/2017          Codes Class Noted - Resolved POA    PFO (patent foramen ovale) ICD-10-CM: Q21.1  ICD-9-CM: 745.5  12/17/2019 - Present Yes        Arrhythmia ICD-10-CM: I49.9  ICD-9-CM: 427.9  12/15/2019 - Present Yes        Hyperlipemia ICD-10-CM: E78.5  ICD-9-CM: 272.4  12/15/2019 - Present Yes        * (Principal) Acute embolic stroke [facility]) [id number]: I63.9  ICD-9-CM: 434.11  12/12/2019 - Present Yes        Hypertension (Chronic) ICD-10-CM: I10  ICD-9-CM: 401.9  Unknown - Present Yes        Type 2 diabetes mellitus (HCC) (Chronic) ICD-10-CM: E11.9  ICD-9-CM: 250.00  Unknown - Present Yes              PLAN:    Acute embolic stroke  -is \"outpatient ready\" so no routine labs indicated at this point  -MRI with acute infarcts in L cerebellar hemisphere, deep frontoparietal white matter, and R paramedian yariel. old lacunar infarcts.    -ISMAEL: 3 mm PFO, Cardiology stated he needs an evaluation for closure PFO with high risk features including significant (3 mm) separation of septum secundum and primum as well as large shunt.  Will await recovery of CVA.  Plan is 4 week event monitor as outpatient.  Will then see in office and if no arrhythmias, will plan PFO closure  -continue statin, ASA     Hypertension:   -lisinopril, metoprolol     Type 2 diabetes mellitus:    -cont current     Left upper extremity pain  -titrate down tylenol from q6h to q8h (2/23)     Allergies  Benadryl for allergies as needed    DISPO: waiting for pt to be deemed \"disabled\" which in neurologic cases have to allow for 6 months showing chronicity.  Then he can get medicaid after that.   DVT ppx:  lovenox   Signed:  Richy Garza MD

## 2020-02-25 PROCEDURE — 74011250637 HC RX REV CODE- 250/637: Performed by: INTERNAL MEDICINE

## 2020-02-25 PROCEDURE — 74011250637 HC RX REV CODE- 250/637: Performed by: HOSPITALIST

## 2020-02-25 PROCEDURE — 74011250637 HC RX REV CODE- 250/637: Performed by: FAMILY MEDICINE

## 2020-02-25 PROCEDURE — 74011250636 HC RX REV CODE- 250/636: Performed by: INTERNAL MEDICINE

## 2020-02-25 PROCEDURE — 65270000029 HC RM PRIVATE

## 2020-02-25 RX ORDER — METFORMIN HYDROCHLORIDE 500 MG/1
1000 TABLET ORAL
Status: DISCONTINUED | OUTPATIENT
Start: 2020-02-25 | End: 2020-03-02

## 2020-02-25 RX ORDER — METFORMIN HYDROCHLORIDE 500 MG/1
500 TABLET ORAL
Status: DISCONTINUED | OUTPATIENT
Start: 2020-02-25 | End: 2020-03-02

## 2020-02-25 RX ADMIN — ASPIRIN 81 MG 81 MG: 81 TABLET ORAL at 08:44

## 2020-02-25 RX ADMIN — METFORMIN HYDROCHLORIDE 1000 MG: 500 TABLET ORAL at 17:26

## 2020-02-25 RX ADMIN — METFORMIN HYDROCHLORIDE 500 MG: 500 TABLET ORAL at 08:43

## 2020-02-25 RX ADMIN — LISINOPRIL 40 MG: 20 TABLET ORAL at 08:43

## 2020-02-25 RX ADMIN — GUAIFENESIN 100 MG: 100 SOLUTION ORAL at 17:30

## 2020-02-25 RX ADMIN — ACETAMINOPHEN 650 MG: 325 TABLET, FILM COATED ORAL at 14:30

## 2020-02-25 RX ADMIN — DIPHENHYDRAMINE HYDROCHLORIDE 25 MG: 25 CAPSULE ORAL at 17:29

## 2020-02-25 RX ADMIN — ENOXAPARIN SODIUM 40 MG: 40 INJECTION SUBCUTANEOUS at 14:30

## 2020-02-25 RX ADMIN — ACETAMINOPHEN 650 MG: 325 TABLET, FILM COATED ORAL at 05:24

## 2020-02-25 RX ADMIN — ATORVASTATIN CALCIUM 80 MG: 80 TABLET, FILM COATED ORAL at 21:34

## 2020-02-25 RX ADMIN — ACETAMINOPHEN 650 MG: 325 TABLET, FILM COATED ORAL at 21:34

## 2020-02-25 RX ADMIN — Medication 10 ML: at 21:34

## 2020-02-25 RX ADMIN — METOPROLOL SUCCINATE 25 MG: 25 TABLET, FILM COATED, EXTENDED RELEASE ORAL at 08:44

## 2020-02-25 NOTE — PROGRESS NOTES
Problem: Patient Education: Go to Patient Education Activity  Goal: Patient/Family Education  Outcome: Progressing Towards Goal     Problem: Pressure Injury - Risk of  Goal: *Prevention of pressure injury  Description  Document Dewey Scale and appropriate interventions in the flowsheet. Outcome: Progressing Towards Goal  Note: Pressure Injury Interventions:  Sensory Interventions: Assess changes in LOC    Moisture Interventions: Absorbent underpads, Check for incontinence Q2 hours and as needed, Limit adult briefs, Minimize layers    Activity Interventions: Increase time out of bed, Pressure redistribution bed/mattress(bed type), PT/OT evaluation    Mobility Interventions: HOB 30 degrees or less, Pressure redistribution bed/mattress (bed type), PT/OT evaluation    Nutrition Interventions: Document food/fluid/supplement intake    Friction and Shear Interventions: HOB 30 degrees or less, Minimize layers                Problem: Patient Education: Go to Patient Education Activity  Goal: Patient/Family Education  Outcome: Progressing Towards Goal     Problem: Falls - Risk of  Goal: *Absence of Falls  Description  Document Jeanne Fall Risk and appropriate interventions in the flowsheet.   Outcome: Progressing Towards Goal  Note: Fall Risk Interventions:  Mobility Interventions: Bed/chair exit alarm, Communicate number of staff needed for ambulation/transfer, OT consult for ADLs, Patient to call before getting OOB, PT Consult for mobility concerns    Mentation Interventions: Bed/chair exit alarm, Door open when patient unattended    Medication Interventions: Bed/chair exit alarm, Patient to call before getting OOB, Teach patient to arise slowly    Elimination Interventions: Bed/chair exit alarm, Call light in reach, Patient to call for help with toileting needs, Stay With Me (per policy), Toileting schedule/hourly rounds, Toilet paper/wipes in reach    History of Falls Interventions: Bed/chair exit alarm, Consult care management for discharge planning, Door open when patient unattended, Investigate reason for fall         Problem: Patient Education: Go to Patient Education Activity  Goal: Patient/Family Education  Outcome: Progressing Towards Goal     Problem: Diabetes Self-Management  Goal: *Disease process and treatment process  Description  Define diabetes and identify own type of diabetes; list 3 options for treating diabetes. Outcome: Progressing Towards Goal  Goal: *Incorporating nutritional management into lifestyle  Description  Describe effect of type, amount and timing of food on blood glucose; list 3 methods for planning meals. Outcome: Progressing Towards Goal  Goal: *Incorporating physical activity into lifestyle  Description  State effect of exercise on blood glucose levels. Outcome: Progressing Towards Goal  Goal: *Developing strategies to promote health/change behavior  Description  Define the ABC's of diabetes; identify appropriate screenings, schedule and personal plan for screenings. Outcome: Progressing Towards Goal  Goal: *Using medications safely  Description  State effect of diabetes medications on diabetes; name diabetes medication taking, action and side effects. Outcome: Progressing Towards Goal  Goal: *Monitoring blood glucose, interpreting and using results  Description  Identify recommended blood glucose targets  and personal targets. Outcome: Progressing Towards Goal  Goal: *Prevention, detection, treatment of acute complications  Description  List symptoms of hyper- and hypoglycemia; describe how to treat low blood sugar and actions for lowering  high blood glucose level. Outcome: Progressing Towards Goal  Goal: *Prevention, detection and treatment of chronic complications  Description  Define the natural course of diabetes and describe the relationship of blood glucose levels to long term complications of diabetes.   Outcome: Progressing Towards Goal  Goal: *Developing strategies to address psychosocial issues  Description  Describe feelings about living with diabetes; identify support needed and support network  Outcome: Progressing Towards Goal     Problem: Patient Education: Go to Patient Education Activity  Goal: Patient/Family Education  Outcome: Progressing Towards Goal     Problem: Patient Education: Go to Patient Education Activity  Goal: Patient/Family Education  Outcome: Progressing Towards Goal     Problem: Nutrition Deficit  Goal: *Optimize nutritional status  Outcome: Progressing Towards Goal     Problem: Patient Education: Go to Patient Education Activity  Goal: Patient/Family Education  Outcome: Progressing Towards Goal     Problem: General Medical Care Plan  Goal: *Vital signs within specified parameters  Outcome: Progressing Towards Goal  Goal: *Labs within defined limits  Outcome: Progressing Towards Goal  Goal: *Absence of infection signs and symptoms  Description  Wash hand more often   Outcome: Progressing Towards Goal  Goal: *Optimal pain control at patient's stated goal  Outcome: Progressing Towards Goal  Goal: *Skin integrity maintained  Outcome: Progressing Towards Goal  Goal: *Fluid volume balance  Outcome: Progressing Towards Goal  Goal: *Optimize nutritional status  Outcome: Progressing Towards Goal  Goal: *Anxiety reduced or absent  Outcome: Progressing Towards Goal  Goal: *Progressive mobility and function (eg: ADL's)  Outcome: Progressing Towards Goal     Problem: Patient Education: Go to Patient Education Activity  Goal: Patient/Family Education  Outcome: Progressing Towards Goal     Problem: Patient Education: Go to Patient Education Activity  Goal: Patient/Family Education  Outcome: Progressing Towards Goal     Problem: Patient Education: Go to Patient Education Activity  Goal: Patient/Family Education  Outcome: Progressing Towards Goal     Problem: Patient Education: Go to Patient Education Activity  Goal: Patient/Family Education  Outcome: Progressing Towards Goal Problem: Ischemic Stroke: Discharge Outcomes  Goal: *Verbalizes anxiety and depression are reduced or absent  Outcome: Progressing Towards Goal  Goal: *Verbalize understanding of risk factor modification(Stroke Metric)  Outcome: Progressing Towards Goal  Goal: *Hemodynamically stable  Outcome: Progressing Towards Goal  Goal: *Absence of aspiration pneumonia  Outcome: Progressing Towards Goal  Goal: *Aware of needed dietary changes  Outcome: Progressing Towards Goal  Goal: *Verbalize understanding of prescribed medications including anti-coagulants, anti-lipid, and/or anti-platelets(Stroke Metric)  Outcome: Progressing Towards Goal  Goal: *Tolerating diet  Outcome: Progressing Towards Goal  Goal: *Aware of follow-up diagnostics related to anticoagulants  Outcome: Progressing Towards Goal  Goal: *Ability to perform ADLs and demonstrates progressive mobility and function  Outcome: Progressing Towards Goal  Goal: *Absence of DVT(Stroke Metric)  Outcome: Progressing Towards Goal  Goal: *Absence of aspiration  Outcome: Progressing Towards Goal  Goal: *Optimal pain control at patient's stated goal  Outcome: Progressing Towards Goal  Goal: *Home safety concerns addressed  Outcome: Progressing Towards Goal  Goal: *Describes available resources and support systems  Outcome: Progressing Towards Goal  Goal: *Verbalizes understanding of activation of EMS(911) for stroke symptoms(Stroke Metric)  Outcome: Progressing Towards Goal  Goal: *Understands and describes signs and symptoms to report to providers(Stroke Metric)  Outcome: Progressing Towards Goal  Goal: *Neurolgocially stable (absence of additional neurological deficits)  Outcome: Progressing Towards Goal  Goal: *Verbalizes importance of follow-up with primary care physician(Stroke Metric)  Outcome: Progressing Towards Goal  Goal: *Smoking cessation discussed,if applicable(Stroke Metric)  Outcome: Progressing Towards Goal  Goal: *Depression screening completed(Stroke Metric)  Outcome: Progressing Towards Goal     Problem: Pain  Goal: *Control of Pain  Outcome: Progressing Towards Goal     Problem: Patient Education: Go to Patient Education Activity  Goal: Patient/Family Education  Outcome: Progressing Towards Goal     Problem: Patient Education: Go to Patient Education Activity  Goal: Patient/Family Education  Outcome: Progressing Towards Goal

## 2020-02-25 NOTE — PROGRESS NOTES
Hospitalist Progress Note     Admit Date:  2019  9:07 AM   Name:  Pancho Chin   Age:  53 y.o.  :  1975   MRN:  [id number]   PCP:  Fernando Romero MD  Treatment Team: Attending Provider: Brenna Best MD; Care Manager: Corwin Teran, RN; Care Manager: Chelo Mace LMSW; Utilization Review: Akira Palacios RN; Nurse Practitioner: Chichi Romo NP; Charge Nurse: Edmar Fuentes Primary Nurse: Damaso Montero    Subjective:   52 yo male with PMH of DM II, HTN who presented 19 with c/o left leg and arm weakness, left facial droop and dysarthria.  Code S called. MRI with acute infarctions in left cerebellar hemisphere, deep frontoparietal white matter and right paramedian yariel.  Also noted to have old lacunar infarcts. CTA without large vessel occlusions, however some stenosis noted.  Echo showed PFO, Cardiology to f/u outpatient for evaluation for closure.  Neurology consulted.  Started on ASA, statin.  Pt is uninsured, difficult placement, case management working on plan.     Today: No acute events. No concerns per patient. I discussed again with  who has informed me there are currently no options for the patient to be safely discharged given his lack of funding. Nursing notes and chart reviewed. Review of Systems negative with exception of pertinent positives noted above.       Objective:     Patient Vitals for the past 24 hrs:   Temp Pulse Resp BP SpO2   20 1440 98.4 °F (36.9 °C) 70 17 128/83 96 %   20 1115 98 °F (36.7 °C) 65 18 122/80 97 %   20 0800 97.9 °F (36.6 °C) 72 17 136/87 93 %   20 0449 97.9 °F (36.6 °C) 64 17 136/84 97 %   20 0008 97.9 °F (36.6 °C) 74 17 125/77 96 %   20 1949 98.1 °F (36.7 °C) 73 18 123/81 96 %   20 1553 98.5 °F (36.9 °C) 77 18 120/74 96 %     Oxygen Therapy  O2 Sat (%): 96 % (20 1440)  Pulse via Oximetry: 66 beats per minute (20 0000)  O2 Device: Room air (20 0000)  No intake or output data in the 24 hours ending 02/25/20 1529      General:    Well nourished. Alert. LUE unchanged from previous exam  CV:   RRR. No murmur, rub, or gallop. Lungs:   CTAB. No wheezing, rhonchi, or rales. Abdomen:   Soft, nontender, nondistended. Extremities: Warm and dry. No cyanosis or edema. Skin:     No rashes or jaundice. Data Review:  I have reviewed all labs, meds, telemetry events, and studies from the last 24 hours. No results found for this or any previous visit (from the past 24 hour(s)). All Micro Results     None          Current Meds:  Current Facility-Administered Medications   Medication Dose Route Frequency    metFORMIN (GLUCOPHAGE) tablet 1,000 mg  1,000 mg Oral DAILY WITH DINNER    metFORMIN (GLUCOPHAGE) tablet 500 mg  500 mg Oral DAILY WITH BREAKFAST    diphenhydrAMINE (BENADRYL) capsule 25 mg  25 mg Oral Q6H PRN    acetaminophen (TYLENOL) tablet 650 mg  650 mg Oral Q8H    lip protectant (BLISTEX) ointment 1 Each  1 Each Topical PRN    metoprolol succinate (TOPROL-XL) XL tablet 25 mg  25 mg Oral DAILY    polyethylene glycol (MIRALAX) packet 17 g  17 g Oral DAILY PRN    guaiFENesin (ROBITUSSIN) 100 mg/5 mL oral liquid 100 mg  100 mg Oral Q4H PRN    hydrALAZINE (APRESOLINE) 20 mg/mL injection 20 mg  20 mg IntraVENous Q4H PRN    atorvastatin (LIPITOR) tablet 80 mg  80 mg Oral QHS    lisinopril (PRINIVIL, ZESTRIL) tablet 40 mg  40 mg Oral DAILY    sodium chloride (NS) flush 5-40 mL  5-40 mL IntraVENous PRN    ondansetron (ZOFRAN) injection 4 mg  4 mg IntraVENous Q4H PRN    aspirin chewable tablet 81 mg  81 mg Oral DAILY    enoxaparin (LOVENOX) injection 40 mg  40 mg SubCUTAneous Q24H    dextrose 40% (GLUTOSE) oral gel 1 Tube  15 g Oral PRN    glucagon (GLUCAGEN) injection 1 mg  1 mg IntraMUSCular PRN    dextrose (D50W) injection syrg 12.5-25 g  25-50 mL IntraVENous PRN       Other Studies (last 24 hours):  No results found.     Assessment and Plan:     Hospital Problems as of 2/25/2020 Date Reviewed: 3/3/2017          Codes Class Noted - Resolved POA    PFO (patent foramen ovale) ICD-10-CM: Q21.1  ICD-9-CM: 745.5  12/17/2019 - Present Yes        Arrhythmia ICD-10-CM: I49.9  ICD-9-CM: 427.9  12/15/2019 - Present Yes        Hyperlipemia ICD-10-CM: E78.5  ICD-9-CM: 272.4  12/15/2019 - Present Yes        * (Principal) Acute embolic stroke [facility]) [id number]: I63.9  ICD-9-CM: 434.11  12/12/2019 - Present Yes        Hypertension (Chronic) ICD-10-CM: I10  ICD-9-CM: 401.9  Unknown - Present Yes        Type 2 diabetes mellitus (HCC) (Chronic) ICD-10-CM: E11.9  ICD-9-CM: 250.00  Unknown - Present Yes              PLAN:    Acute embolic stroke  -is \"outpatient ready\" so no routine labs indicated at this point  -MRI with acute infarcts in L cerebellar hemisphere, deep frontoparietal white matter, and R paramedian yariel. old lacunar infarcts. -ISMAEL: 3 mm PFO, Cardiology stated he needs an evaluation for closure PFO with high risk features including significant (3 mm) separation of septum secundum and primum as well as large shunt.  Will await recovery of CVA.  Plan is 4 week event monitor as outpatient.  Will then see in office and if no arrhythmias, will plan PFO closure  -continue statin, ASA     Hypertension:   -lisinopril, metoprolol     Type 2 diabetes mellitus:    -cont current     Left upper extremity pain  -titrate down tylenol from q6h to q8h (2/23)     Allergies  Benadryl for allergies as needed    DISPO: waiting for pt to be deemed \"disabled\" which in neurologic cases have to allow for 6 months showing chronicity.  Then he can get medicaid after that. Medically stable for discharge.    DVT ppx:  lovenox   Signed:  Ricardo Snell MD

## 2020-02-26 LAB — GLUCOSE BLD STRIP.AUTO-MCNC: 169 MG/DL (ref 65–100)

## 2020-02-26 PROCEDURE — 74011250637 HC RX REV CODE- 250/637: Performed by: FAMILY MEDICINE

## 2020-02-26 PROCEDURE — 74011250637 HC RX REV CODE- 250/637: Performed by: HOSPITALIST

## 2020-02-26 PROCEDURE — 82962 GLUCOSE BLOOD TEST: CPT

## 2020-02-26 PROCEDURE — 65270000029 HC RM PRIVATE

## 2020-02-26 PROCEDURE — 97112 NEUROMUSCULAR REEDUCATION: CPT

## 2020-02-26 PROCEDURE — 97116 GAIT TRAINING THERAPY: CPT

## 2020-02-26 PROCEDURE — 74011250636 HC RX REV CODE- 250/636: Performed by: INTERNAL MEDICINE

## 2020-02-26 PROCEDURE — 74011250637 HC RX REV CODE- 250/637: Performed by: INTERNAL MEDICINE

## 2020-02-26 PROCEDURE — 97164 PT RE-EVAL EST PLAN CARE: CPT

## 2020-02-26 RX ADMIN — ASPIRIN 81 MG 81 MG: 81 TABLET ORAL at 08:53

## 2020-02-26 RX ADMIN — LISINOPRIL 40 MG: 20 TABLET ORAL at 08:52

## 2020-02-26 RX ADMIN — DIPHENHYDRAMINE HYDROCHLORIDE 25 MG: 25 CAPSULE ORAL at 17:39

## 2020-02-26 RX ADMIN — METFORMIN HYDROCHLORIDE 1000 MG: 500 TABLET ORAL at 17:39

## 2020-02-26 RX ADMIN — ENOXAPARIN SODIUM 40 MG: 40 INJECTION SUBCUTANEOUS at 15:15

## 2020-02-26 RX ADMIN — ATORVASTATIN CALCIUM 80 MG: 80 TABLET, FILM COATED ORAL at 22:01

## 2020-02-26 RX ADMIN — GUAIFENESIN 100 MG: 100 SOLUTION ORAL at 17:39

## 2020-02-26 RX ADMIN — Medication 10 ML: at 06:24

## 2020-02-26 RX ADMIN — ACETAMINOPHEN 650 MG: 325 TABLET, FILM COATED ORAL at 06:23

## 2020-02-26 RX ADMIN — ACETAMINOPHEN 650 MG: 325 TABLET, FILM COATED ORAL at 22:01

## 2020-02-26 RX ADMIN — METOPROLOL SUCCINATE 25 MG: 25 TABLET, FILM COATED, EXTENDED RELEASE ORAL at 08:53

## 2020-02-26 RX ADMIN — METFORMIN HYDROCHLORIDE 500 MG: 500 TABLET ORAL at 08:53

## 2020-02-26 RX ADMIN — ACETAMINOPHEN 650 MG: 325 TABLET, FILM COATED ORAL at 15:15

## 2020-02-26 NOTE — PROGRESS NOTES
Occupational Therapy Note:  Chart reviewed and OT treatment attempted. Patient off the floor at this time. Will check back as schedule permits and patient is available for occupational therapy treatment.    Sarah Lui, OTR/L

## 2020-02-26 NOTE — PROGRESS NOTES
Hospitalist Progress Note     Admit Date:  2019  9:07 AM   Name:  Scarlet Tellez   Age:  53 y.o.  :  1975   MRN:  [id number]   PCP:  Jessica Will MD  Treatment Team: Attending Provider: Bryce Cavazos MD; Care Manager: Ophelia Rincon RN; Care Manager: Quinton Poole, [facility] [place]; Utilization Review: Nena Field RN; Nurse Practitioner: Zak Moss NP; Primary Nurse: Benita Area Charge Nurse: George Cooper; Occupational Therapist: Stephy Raygoza, OTR/L    Subjective:   52 yo male with PMH of DM II, HTN who presented 19 with c/o left leg and arm weakness, left facial droop and dysarthria.  Code S called. MRI with acute infarctions in left cerebellar hemisphere, deep frontoparietal white matter and right paramedian yariel.  Also noted to have old lacunar infarcts. CTA without large vessel occlusions, however some stenosis noted.  Echo showed PFO, Cardiology to f/u outpatient for evaluation for closure.  Neurology consulted.  Started on ASA, statin.  Pt is uninsured, difficult placement, case management working on plan.     Today: No acute events. No concerns per patient. I discussed again with  who has informed me there are currently no options for the patient to be safely discharged given his lack of funding. Nursing notes and chart reviewed. Review of Systems negative with exception of pertinent positives noted above.       Objective:     Patient Vitals for the past 24 hrs:   Temp Pulse Resp BP SpO2   20 1200 97.9 °F (36.6 °C) 62 18 124/84 94 %   20 0800 97.8 °F (36.6 °C) 72 18 (!) 138/91 94 %   20 0400 97.3 °F (36.3 °C) 74 18 119/80 94 %   20 0318   18     20 0000 97.4 °F (36.3 °C) 80 18 118/84 94 %   20 2000 98.1 °F (36.7 °C) 68 18 128/82 93 %     Oxygen Therapy  O2 Sat (%): 94 % (20 1200)  Pulse via Oximetry: 66 beats per minute (20 0000)  O2 Device: Room air (20)    Intake/Output Summary (Last 24 hours) at 2/26/2020 1546  Last data filed at 2/25/2020 2128  Gross per 24 hour   Intake 222 ml   Output    Net 222 ml         General:    Well nourished. Alert. LUE unchanged from previous exam, ambulating the halls with physical therapy  CV:   RRR. No murmur, rub, or gallop. Lungs:   CTAB. No wheezing, rhonchi, or rales. Abdomen:   Soft, nontender, nondistended. Extremities: Warm and dry. No cyanosis or edema. Skin:     No rashes or jaundice. Data Review:  I have reviewed all labs, meds, telemetry events, and studies from the last 24 hours.     Recent Results (from the past 24 hour(s))   GLUCOSE, POC    Collection Time: 02/26/20  8:01 AM   Result Value Ref Range    Glucose (POC) 169 (H) 65 - 100 mg/dL        All Micro Results     None          Current Meds:  Current Facility-Administered Medications   Medication Dose Route Frequency    metFORMIN (GLUCOPHAGE) tablet 1,000 mg  1,000 mg Oral DAILY WITH DINNER    metFORMIN (GLUCOPHAGE) tablet 500 mg  500 mg Oral DAILY WITH BREAKFAST    diphenhydrAMINE (BENADRYL) capsule 25 mg  25 mg Oral Q6H PRN    acetaminophen (TYLENOL) tablet 650 mg  650 mg Oral Q8H    lip protectant (BLISTEX) ointment 1 Each  1 Each Topical PRN    metoprolol succinate (TOPROL-XL) XL tablet 25 mg  25 mg Oral DAILY    polyethylene glycol (MIRALAX) packet 17 g  17 g Oral DAILY PRN    guaiFENesin (ROBITUSSIN) 100 mg/5 mL oral liquid 100 mg  100 mg Oral Q4H PRN    hydrALAZINE (APRESOLINE) 20 mg/mL injection 20 mg  20 mg IntraVENous Q4H PRN    atorvastatin (LIPITOR) tablet 80 mg  80 mg Oral QHS    lisinopril (PRINIVIL, ZESTRIL) tablet 40 mg  40 mg Oral DAILY    sodium chloride (NS) flush 5-40 mL  5-40 mL IntraVENous PRN    ondansetron (ZOFRAN) injection 4 mg  4 mg IntraVENous Q4H PRN    aspirin chewable tablet 81 mg  81 mg Oral DAILY    enoxaparin (LOVENOX) injection 40 mg  40 mg SubCUTAneous Q24H    dextrose 40% (GLUTOSE) oral gel 1 Tube  15 g Oral PRN    glucagon (GLUCAGEN) injection 1 mg  1 mg IntraMUSCular PRN    dextrose (D50W) injection syrg 12.5-25 g  25-50 mL IntraVENous PRN       Other Studies (last 24 hours):  No results found. Assessment and Plan:     Hospital Problems as of 2/26/2020 Date Reviewed: 3/3/2017          Codes Class Noted - Resolved POA    PFO (patent foramen ovale) ICD-10-CM: Q21.1  ICD-9-CM: 745.5  12/17/2019 - Present Yes        Arrhythmia ICD-10-CM: I49.9  ICD-9-CM: 427.9  12/15/2019 - Present Yes        Hyperlipemia ICD-10-CM: E78.5  ICD-9-CM: 272.4  12/15/2019 - Present Yes        * (Principal) Acute embolic stroke [facility]) CSQ-30-SC: I63.9  ICD-9-CM: 434.11  12/12/2019 - Present Yes        Hypertension (Chronic) ICD-10-CM: I10  ICD-9-CM: 401.9  Unknown - Present Yes        Type 2 diabetes mellitus (HCC) (Chronic) ICD-10-CM: E11.9  ICD-9-CM: 250.00  Unknown - Present Yes              PLAN:    Acute embolic stroke  -is \"outpatient ready\" so no routine labs indicated at this point  -MRI with acute infarcts in L cerebellar hemisphere, deep frontoparietal white matter, and R paramedian yariel. old lacunar infarcts. -ISMAEL: 3 mm PFO, Cardiology stated he needs an evaluation for closure PFO with high risk features including significant (3 mm) separation of septum secundum and primum as well as large shunt.  Will await recovery of CVA.  Plan is 4 week event monitor as outpatient. Caty Mathew then see in office and if no arrhythmias, will plan PFO closure  -continue statin, ASA     Hypertension:   -lisinopril, metoprolol     Type 2 diabetes mellitus:    -Patient currently on metformin 1500 mg daily, escalate to 2000 mg daily on 3/3/2020     Left upper extremity pain  -titrate down tylenol from q6h to q8h (2/23)     Allergies  Benadryl for allergies as needed    DISPO: waiting for pt to be deemed \"disabled\" which in neurologic cases have to allow for 6 months showing chronicity.  Then he can get medicaid after that. Medically stable for discharge.    DVT ppx:  lovenox   Signed:  Vicente Desai MD

## 2020-02-26 NOTE — PROGRESS NOTES
Problem: Mobility Impaired (Adult and Pediatric)  Goal: *Acute Goals and Plan of Care  Description  GOALS UPDATED ON RE-ASSESSMENT 2/26/2020  1. Patient will perform bed mobility with supervision and 0 verbal cues within 7 treatment days. 2. Patient will perform STS transfer with CGA within 7 treatment days. 3. Patient will perform bed to (wheel) chair transfer with CGA with 7 treatment days. 4. Patient will demonstrate fair+ dynamic balance throughout stance phase on L LE within 7 treatment days. 5. Patient will require CGA throughout swing phase on (to advance) L LE within 7 treatment days. 6. Patient demonstrate 3+/5 strength in L LE hip flexion, hip abduction, and hip adduction) within 7 treatment days. 7. Patient will ambulate 100ft+ with CGA and least retrictive assistive device within 7 treatment days. PERVIOUSLY MET GOALS:        PHYSICAL THERAPY: Daily Note, Re-evaluation and AM 2/26/2020  INPATIENT: PT Visit Days : 1  Payor: MEDICAID PENDING / Plan: [employer] PENDING / Product Type: Medicaid /       NAME/AGE/GENDER: Scarlet Tellez is a 53 y.o. male   PRIMARY DIAGNOSIS: Acute ischemic stroke ([address] [I63.9]  Cerebrovascular accident (CVA) due to embolism ([address] [V59.6] Acute embolic stroke ([address] Acute embolic stroke ([address]       ICD-10: Treatment Diagnosis:   · Difficulty in walking, Not elsewhere classified (R26.2)  · Hemiplegia and hemiparesis following cerebral infarction affecting left non-dominant side (J13.203)   Precaution/Allergies:  Patient has no known allergies. ASSESSMENT:     Mr. Fiorella Urbina is a 53year old admitted R MCA CVA and seen this AM for physical therapy re-evaluation to address progression toward acute PT goals. Patient has met 8/9 goals since last re-assessment and demonstrated progress in bed mobility, transfers, ambulation, L LE strength, and gait mechanics.  Patient is currently demonstrating trace quad activation, 3-/5 hip flexion, 3-/5 hip adduction, 2+/5 hip abduction, and 2+/5 hamstring/knee flexion. Patient is currently requiring CGA-Minimal assistance with bed mobility and mild verbal cues for technique, CGA-Minimal assistance with STS transfers, minimal assistance x1 for SPT from bed to (wheel) chair, and minimal-moderate assistance for ambulation/gait mechanics with railing vs. Hemiwalker, gait belt, and WC follow. Patient has demonstrated significant progress since initial evaluation; however continues to demonstrate below functional impairments. Will continued to follow with stated plan of care. Goals updated to reflect patient progress. Recommend inpatient rehabilitation at discharge in highly motivated patient with good rehab potential.     2/26/2020: Treatment session included bed mobility and transfer with facilitation and cues for technique, NDT LE with quick strech technique to facilitate quad and hamstring, joint compression, L LE weight bearing, pre-gait weight shifts, static and dynamic sitting and standing balance activity, strengthening exercises with L LE with verbal and tactile cueing, gait training 1x20 and 1x10ft with focus on mechanics of L LE, glute activation, and dynamic standing balance, beginning WC mobility with L UE for holding doors, safety awareness with brake locking, orienting to left visual/spatial field with safety training, and dual tasking. Good session today with patient tolerating increased activity and demonstrating improved functional mobility        This section established at most recent assessment   PROBLEM LIST (Impairments causing functional limitations):  1. Decreased Strength  2. Decreased ADL/Functional Activities  3. Decreased Transfer Abilities  4. Decreased Ambulation Ability/Technique  5. Decreased Balance  6. Increased Pain  7. Decreased Activity Tolerance  8. Increased Fatigue  9.  Decreased Flexibility/Joint Mobility   INTERVENTIONS PLANNED: (Benefits and precautions of physical therapy have been discussed with the patient.)  1. Balance Exercise  2. Bed Mobility  3. Family Education  4. Gait Training  5. Home Exercise Program (HEP)  6. Manual Therapy  7. Neuromuscular Re-education/Strengthening  8. Range of Motion (ROM)  9. Therapeutic Activites  10. Therapeutic Exercise/Strengthening  11. Transfer Training     TREATMENT PLAN: Frequency/Duration: 3 times a week for duration of hospital stay  Rehabilitation Potential For Stated Goals: Good     REHAB RECOMMENDATIONS (at time of discharge pending progress):    Placement: It is my opinion, based on this patient's performance to date, that Mr. Teresa Christine may benefit from intensive therapy at an [address] [place] [address] after discharge due to a probable need for close medical supervision by a rehab physician, a probable need for multiple therapy disciplines and potential to make ongoing and sustainable functional improvement that is of practical value. .  Equipment:    TBD pending progress with therapy. HISTORY:   History of Present Injury/Illness (Reason for Referral):  Per H&P: \"Pt is a 52 y/o smoker with DM, HTN, who presented to ER with L leg and arm weakness, L facial droop, dysarthria. First noted L leg weakness late night 12/11 when he woke up to go to the bathroom. He was normal when he went to bed around 1030. Woke up this morning and had persistent weakness L leg and also now noted in L arm. EMS called. Noted to have slurred speech and L facial droop as well. Code S called in ER around 9am.  MRI with acute infarcts in L cerebellar hemisphere, deep frontoparietal white matter, and R paramedian yariel. Also noted old lacunar infarcts. No large vessel occlusion on CTA, but some stenosis noted. No hx afib, TIA, CVA. No CP, palpitations, SOB. \"  Past Medical History/Comorbidities:   Mr. Teresa Christine  has a past medical history of Acute ischemic stroke ([facility] (12/12/2019), Acute pancreatitis (11/19/2014), Cerebrovascular accident (CVA) due to embolism ([address] (12/12/2019), Diabetes ([facility] (2002), Diabetes ([facility] Diabetes mellitus, and Hypertension. He also has no past medical history of Arthritis, Asthma, Autoimmune disease ([facility] CAD (coronary artery disease), Cancer ([facility] Chronic kidney disease, COPD, Dementia, Dementia ([address] Heart failure ([facility] Ill-defined condition, Infectious disease, Liver disease, Other ill-defined conditions(799.89), Psychiatric disorder, PUD (peptic ulcer disease), Seizures ([facility] or Sleep disorder. Mr. Anoop Myers  has a past surgical history that includes hx hernia repair and hx orthopaedic. Social History/Living Environment:   Home Environment: Apartment  # Steps to Enter: 12  Rails to Enter: Yes  [employer] : Bilateral  One/Two Story Residence: One story  Living Alone: No  Support Systems: Spouse/Significant Other/Partner  Patient Expects to be Discharged to[de-identified] Unknown  Current DME Used/Available at Home: None  Tub or Shower Type: Tub/Shower combination  Prior Level of Function/Work/Activity:  Independent, lives with wife in 2nd story 1 level apartment. No recent falls.    Number of Personal Factors/Comorbidities that affect the Plan of Care: 1-2: MODERATE COMPLEXITY   EXAMINATION:   Most Recent Physical Functioning:   Gross Assessment:  AROM: Grossly decreased, non-functional  PROM: Within functional limits  Strength: Generally decreased, functional(L LE; Grossly decreased L UE)  Coordination: Generally decreased, functional  Tone: Abnormal  Sensation: Intact(L UE and L LE)               Posture:     Balance:  Sitting: Impaired  Sitting - Static: Good (unsupported)  Sitting - Dynamic: Fair (occasional)(+)  Standing: Impaired  Standing - Static: Fair  Standing - Dynamic : Poor(Fair- with UE support) Bed Mobility:  Rolling: Contact guard assistance  Supine to Sit: Contact guard assistance  Sit to Supine: Minimum assistance(for LEs)  Scooting: Contact guard assistance  Wheelchair Mobility:     Transfers:  Sit to Stand: Contact guard assistance  Stand to Sit: Minimum assistance  Bed to Chair: Minimum assistance  Gait:     Base of Support: Center of gravity altered;Shift to right  Gait Abnormalities: Hemiplegic  Distance (ft): 30 Feet (ft)(1x20 and 1x10; working on mechanics today)  Assistive Device: Gait belt(+ Railing use)  Ambulation - Level of Assistance: Minimal assistance; Moderate assistance  Interventions: Safety awareness training; Tactile cues; Verbal cues      Body Structures Involved:  1. Nerves  2. Voice/Speech  3. Bones  4. Joints  5. Muscles Body Functions Affected:  1. Mental  2. Sensory/Pain  3. Neuromusculoskeletal  4. Movement Related Activities and Participation Affected:  1. General Tasks and Demands  2. Mobility  3. Self Care  4. Interpersonal Interactions and Relationships   Number of elements that affect the Plan of Care: 4+: HIGH COMPLEXITY   CLINICAL PRESENTATION:   Presentation: Evolving clinical presentation with changing clinical characteristics: MODERATE COMPLEXITY   CLINICAL DECISION MAKIN [facility] Mobility Inpatient Short Form  How much difficulty does the patient currently have. .. Unable A Lot A Little None   1. Turning over in bed (including adjusting bedclothes, sheets and blankets)? [] 1   [] 2   [x] 3   [] 4   2. Sitting down on and standing up from a chair with arms ( e.g., wheelchair, bedside commode, etc.)   [] 1   [] 2   [x] 3   [] 4   3. Moving from lying on back to sitting on the side of the bed? [] 1   [] 2   [x] 3   [] 4   How much help from another person does the patient currently need. .. Total A Lot A Little None   4. Moving to and from a bed to a chair (including a wheelchair)? [] 1   [] 2   [x] 3   [] 4   5. Need to walk in hospital room? [] 1   [x] 2   [] 3   [] 4   6. Climbing 3-5 steps with a railing? [] 1   [x] 2   [] 3   [] 4   © , Trustees of [address] [place] [address] [address] [id number], under license to [employer].  All rights reserved      Score:  Initial: 13 Most Recent: 16 (Date:2/26/2020)    Interpretation of Tool:  Represents activities that are increasingly more difficult (i.e. Bed mobility, Transfers, Gait). Medical Necessity:     · Patient is expected to demonstrate progress in   · strength, range of motion, balance, coordination, and functional technique  ·  to   · increase independence with all mobility. · .  Reason for Services/Other Comments:  · Patient continues to require skilled intervention due to   · medical complications and mobility deficits which impact his level of function, safety, and independence as indicated above. · .   Use of outcome tool(s) and clinical judgement create a POC that gives a: Questionable prediction of patient's progress: MODERATE COMPLEXITY        TREATMENT:      Pre-treatment Symptoms/Complaints:  none  Pain: Initial: None Post Session:  None     Physical Therapy Re-Evaluation (untimed charge)    Gait Training (  10 Minutes):  Gait training to improve and/or restore physical functioning as related to mobility, strength and balance. Ambulated 30 Feet (ft)(1x20 and 1x10; working on mechanics today) with Minimal assistance; Moderate assistance using a Gait belt(+ Railing use) and moderate Safety awareness training; Tactile cues; Verbal cues related to their sequencing, knee positioning, foot placement, step length, weight shifts, luke walker placement, and posture to promote proper body alignment, promote proper body posture and promote proper body mechanics. Instruction in performance of the above deficits to correct overall gait sequencing and quality.     Neuromuscular Re-education ( 45):  Exercise/activitiesincluding: bed mobility and transfer with facilitation and cues for technique, NDT LE with quick strech technique to facilitate quad and hamstring, joint compression, L LE weight bearing pre-gait weight shifts, static and dynamic sitting and standing balance activity, strengthening exercises with L LE with verbal and tactile cueing, and beginning WC mobility with L UE for holding doors, safety awareness with brake locking, orienting to left visual/spatial field with safety training, and dual tasking. to improve balance, coordination, posture and functional mobility. Required minimal visual, verbal, manual and tactile cues to promote static and dynamic balance in standing. Braces/Orthotics/Lines/Etc:   · Sling to L UE throughout transfers and ambulation  Treatment/Session Assessment:    · Response to Treatment:  See above  · Interdisciplinary Collaboration:   o Physical Therapist  o Registered Nurse  o Rehabilitation Attendant  · After treatment position/precautions:   o Supine in bed  o Bed alarm/tab alert on  o Bed/Chair-wheels locked  o Bed in low position  o Call light within reach  o RN notified  o Patient taken to [place] and 10th floor throughout treatment; primary RN notified; unit secreatary signed patient out and returned on arrival to floor unit secretary and primary RN notified. · Compliance with Program/Exercises: Compliant all of the time  · Recommendations/Intent for next treatment session: \"Next visit will focus on advancements to more challenging activities and reduction in assistance provided\".     Total Treatment Duration:  PT Patient Time In/Time Out  Time In: 0900  Time Out: 1003    Loyd Haywood DPT

## 2020-02-26 NOTE — PROGRESS NOTES
Problem: Patient Education: Go to Patient Education Activity  Goal: Patient/Family Education  Outcome: Progressing Towards Goal     Problem: Patient Education: Go to Patient Education Activity  Goal: Patient/Family Education  Outcome: Progressing Towards Goal     Problem: Patient Education: Go to Patient Education Activity  Goal: Patient/Family Education  Outcome: Progressing Towards Goal     Problem: Diabetes Self-Management  Goal: *Disease process and treatment process  Description  Define diabetes and identify own type of diabetes; list 3 options for treating diabetes. Outcome: Progressing Towards Goal  Goal: *Incorporating nutritional management into lifestyle  Description  Describe effect of type, amount and timing of food on blood glucose; list 3 methods for planning meals. Outcome: Progressing Towards Goal  Goal: *Incorporating physical activity into lifestyle  Description  State effect of exercise on blood glucose levels. Outcome: Progressing Towards Goal  Goal: *Developing strategies to promote health/change behavior  Description  Define the ABC's of diabetes; identify appropriate screenings, schedule and personal plan for screenings. Outcome: Progressing Towards Goal  Goal: *Using medications safely  Description  State effect of diabetes medications on diabetes; name diabetes medication taking, action and side effects. Outcome: Progressing Towards Goal  Goal: *Monitoring blood glucose, interpreting and using results  Description  Identify recommended blood glucose targets  and personal targets. Outcome: Progressing Towards Goal  Goal: *Prevention, detection, treatment of acute complications  Description  List symptoms of hyper- and hypoglycemia; describe how to treat low blood sugar and actions for lowering  high blood glucose level.   Outcome: Progressing Towards Goal  Goal: *Prevention, detection and treatment of chronic complications  Description  Define the natural course of diabetes and describe the relationship of blood glucose levels to long term complications of diabetes.   Outcome: Progressing Towards Goal  Goal: *Developing strategies to address psychosocial issues  Description  Describe feelings about living with diabetes; identify support needed and support network  Outcome: Progressing Towards Goal     Problem: Patient Education: Go to Patient Education Activity  Goal: Patient/Family Education  Outcome: Progressing Towards Goal     Problem: Patient Education: Go to Patient Education Activity  Goal: Patient/Family Education  Outcome: Progressing Towards Goal     Problem: Nutrition Deficit  Goal: *Optimize nutritional status  Outcome: Progressing Towards Goal     Problem: Patient Education: Go to Patient Education Activity  Goal: Patient/Family Education  Outcome: Progressing Towards Goal     Problem: General Medical Care Plan  Goal: *Vital signs within specified parameters  Outcome: Progressing Towards Goal  Goal: *Labs within defined limits  Outcome: Progressing Towards Goal  Goal: *Absence of infection signs and symptoms  Description  Wash hand more often   Outcome: Progressing Towards Goal  Goal: *Optimal pain control at patient's stated goal  Outcome: Progressing Towards Goal  Goal: *Skin integrity maintained  Outcome: Progressing Towards Goal  Goal: *Fluid volume balance  Outcome: Progressing Towards Goal  Goal: *Optimize nutritional status  Outcome: Progressing Towards Goal  Goal: *Anxiety reduced or absent  Outcome: Progressing Towards Goal  Goal: *Progressive mobility and function (eg: ADL's)  Outcome: Progressing Towards Goal     Problem: Patient Education: Go to Patient Education Activity  Goal: Patient/Family Education  Outcome: Progressing Towards Goal     Problem: Patient Education: Go to Patient Education Activity  Goal: Patient/Family Education  Outcome: Progressing Towards Goal     Problem: Patient Education: Go to Patient Education Activity  Goal: Patient/Family Education  Outcome: Progressing Towards Goal     Problem: Patient Education: Go to Patient Education Activity  Goal: Patient/Family Education  Outcome: Progressing Towards Goal     Problem: Ischemic Stroke: Discharge Outcomes  Goal: *Verbalizes anxiety and depression are reduced or absent  Outcome: Progressing Towards Goal  Goal: *Verbalize understanding of risk factor modification(Stroke Metric)  Outcome: Progressing Towards Goal  Goal: *Hemodynamically stable  Outcome: Progressing Towards Goal  Goal: *Absence of aspiration pneumonia  Outcome: Progressing Towards Goal  Goal: *Aware of needed dietary changes  Outcome: Progressing Towards Goal  Goal: *Verbalize understanding of prescribed medications including anti-coagulants, anti-lipid, and/or anti-platelets(Stroke Metric)  Outcome: Progressing Towards Goal  Goal: *Tolerating diet  Outcome: Progressing Towards Goal  Goal: *Aware of follow-up diagnostics related to anticoagulants  Outcome: Progressing Towards Goal  Goal: *Ability to perform ADLs and demonstrates progressive mobility and function  Outcome: Progressing Towards Goal  Goal: *Absence of DVT(Stroke Metric)  Outcome: Progressing Towards Goal  Goal: *Absence of aspiration  Outcome: Progressing Towards Goal  Goal: *Optimal pain control at patient's stated goal  Outcome: Progressing Towards Goal  Goal: *Home safety concerns addressed  Outcome: Progressing Towards Goal  Goal: *Describes available resources and support systems  Outcome: Progressing Towards Goal  Goal: *Verbalizes understanding of activation of EMS(911) for stroke symptoms(Stroke Metric)  Outcome: Progressing Towards Goal  Goal: *Understands and describes signs and symptoms to report to providers(Stroke Metric)  Outcome: Progressing Towards Goal  Goal: *Neurolgocially stable (absence of additional neurological deficits)  Outcome: Progressing Towards Goal  Goal: *Verbalizes importance of follow-up with primary care physician(Stroke Metric)  Outcome: Progressing Towards Goal  Goal: *Smoking cessation discussed,if applicable(Stroke Metric)  Outcome: Progressing Towards Goal  Goal: *Depression screening completed(Stroke Metric)  Outcome: Progressing Towards Goal     Problem: Pain  Goal: *Control of Pain  Outcome: Progressing Towards Goal     Problem: Patient Education: Go to Patient Education Activity  Goal: Patient/Family Education  Outcome: Progressing Towards Goal     Problem: Patient Education: Go to Patient Education Activity  Goal: Patient/Family Education  Outcome: Progressing Towards Goal

## 2020-02-26 NOTE — PROGRESS NOTES
02/25/20 1910   NIH Stroke Scale   Interval Other (comment)   LOC 0   LOC Questions 0   LOC Commands 0   Best Gaze 0   Visual 0   Facial Palsy 1   Motor Right Arm 0   Motor Left Arm 2   Motor Right Leg 0   Motor Left Leg 2   Limb Ataxia 0   Sensory 0   Best Language 1   Dysarthria 1   Extinction and Inattention 0   Total 7     Dual shift change NIH with Jasmin Whitley RN.

## 2020-02-27 LAB — GLUCOSE BLD STRIP.AUTO-MCNC: 134 MG/DL (ref 65–100)

## 2020-02-27 PROCEDURE — 92507 TX SP LANG VOICE COMM INDIV: CPT

## 2020-02-27 PROCEDURE — 82962 GLUCOSE BLOOD TEST: CPT

## 2020-02-27 PROCEDURE — 74011250637 HC RX REV CODE- 250/637: Performed by: INTERNAL MEDICINE

## 2020-02-27 PROCEDURE — 74011250637 HC RX REV CODE- 250/637: Performed by: HOSPITALIST

## 2020-02-27 PROCEDURE — 97116 GAIT TRAINING THERAPY: CPT

## 2020-02-27 PROCEDURE — 97535 SELF CARE MNGMENT TRAINING: CPT

## 2020-02-27 PROCEDURE — 74011250637 HC RX REV CODE- 250/637: Performed by: FAMILY MEDICINE

## 2020-02-27 PROCEDURE — 65270000029 HC RM PRIVATE

## 2020-02-27 PROCEDURE — 97530 THERAPEUTIC ACTIVITIES: CPT

## 2020-02-27 PROCEDURE — 74011250636 HC RX REV CODE- 250/636: Performed by: INTERNAL MEDICINE

## 2020-02-27 RX ADMIN — ACETAMINOPHEN 650 MG: 325 TABLET, FILM COATED ORAL at 20:09

## 2020-02-27 RX ADMIN — GUAIFENESIN 100 MG: 100 SOLUTION ORAL at 13:49

## 2020-02-27 RX ADMIN — ASPIRIN 81 MG 81 MG: 81 TABLET ORAL at 09:44

## 2020-02-27 RX ADMIN — LISINOPRIL 40 MG: 20 TABLET ORAL at 09:44

## 2020-02-27 RX ADMIN — ATORVASTATIN CALCIUM 80 MG: 80 TABLET, FILM COATED ORAL at 20:09

## 2020-02-27 RX ADMIN — METFORMIN HYDROCHLORIDE 1000 MG: 500 TABLET ORAL at 17:14

## 2020-02-27 RX ADMIN — DIPHENHYDRAMINE HYDROCHLORIDE 25 MG: 25 CAPSULE ORAL at 20:08

## 2020-02-27 RX ADMIN — Medication 10 ML: at 06:00

## 2020-02-27 RX ADMIN — METFORMIN HYDROCHLORIDE 500 MG: 500 TABLET ORAL at 08:25

## 2020-02-27 RX ADMIN — METOPROLOL SUCCINATE 25 MG: 25 TABLET, FILM COATED, EXTENDED RELEASE ORAL at 09:45

## 2020-02-27 RX ADMIN — ENOXAPARIN SODIUM 40 MG: 40 INJECTION SUBCUTANEOUS at 13:49

## 2020-02-27 RX ADMIN — ACETAMINOPHEN 650 MG: 325 TABLET, FILM COATED ORAL at 05:59

## 2020-02-27 RX ADMIN — ACETAMINOPHEN 650 MG: 325 TABLET, FILM COATED ORAL at 13:49

## 2020-02-27 NOTE — PROGRESS NOTES
Problem: Self Care Deficits Care Plan (Adult)  Goal: *Acute Goals and Plan of Care (Insert Text)  Description  GOALS UPDATED 1/22/2020:   1. Patient will complete dynamic sitting balance for ADLs with supervision. PROGRESSING 2/9/2020 (Progressing, 2/27/2020)  2. Patient will demonstrate appropriate safety awareness and protection of L UE during bed mobility and functional transfers with minimal cues. (Progressing) (Progressing, 2/27/2020)  3. Patient will complete total body bathing and dressing with moderate assistance and adaptive equipment as needed. ( [place] 2/9/2020 (Progressing, 2/27/2020)  4. Patient will complete weightbearing into the L UE with ADL tasks with minimal assistance to improve ability to use as a functional assist during ADL tasks. (Progressing) (Progressing, 2/27/2020)  5.5. Patient will demonstrate L UE SROM HEP within 7 days. 6. Patient will complete bed mobility with stand by assistance and adaptive equipment as needed in preparation for functional transfers. PROGRESSING 2/9/2020  7. 7. Patient will complete functional transfers with minimal assistance with equipment as needed. (New goal)8. (Goal Met)    Timeframe: 7 visits       Outcome: Progressing Towards Goal     OCCUPATIONAL THERAPY: Daily Note and AM    2/27/2020  INPATIENT: OT Visit Days: 6  Payor: MEDICAID PENDING / Plan: [place] PENDING / Product Type: Medicaid /      NAME/AGE/GENDER: Jaxon Isals is a 53 y.o. male   PRIMARY DIAGNOSIS:  Acute ischemic stroke ([facility] [I63.9]  Cerebrovascular accident (CVA) due to embolism ([address] [P19.2] Acute embolic stroke ([address] Acute embolic stroke ([address]       ICD-10: Treatment Diagnosis:    · Generalized Muscle Weakness (M62.81)  · Other lack of cordination (R27.8)  · Hemiplegia and hemiparesis following cerebral infarction affecting   · left non-dominant side (I69.354)  · Abnormal posture (R29.3)   Precautions/Allergies:    NO PULLING ON LUE   LUE in sling with shoulder joint approximated and supported   Patient has no known allergies. ASSESSMENT:     Mr. Radha Roldan presents supine in bed upon arrival. Pt completed bed mobility and functional transfer with min A. Pt completed sponge bath and dressing with the assistance listed below while sitting at sink in wheelchair. Good progress made. Continue POC. This section established at most recent assessment   PROBLEM LIST (Impairments causing functional limitations):  1. Decreased Strength  2. Decreased ADL/Functional Activities  3. Decreased Transfer Abilities  4. Decreased Ambulation Ability/Technique  5. Decreased Balance  6. Decreased Activity Tolerance  7. Increased Fatigue  8. Decreased Flexibility/Joint Mobility  9. Decreased Knowledge of Precautions  10. Decreased [place] with Home Exercise Program   INTERVENTIONS PLANNED: (Benefits and precautions of occupational therapy have been discussed with the patient.)  1. Activities of daily living training  2. Adaptive equipment training  3. Balance training  4. Clothing management  5. Cognitive training  6. Donning&doffing training  7. Keanu tech training  8. Neuromuscular re-eduation  9. Therapeutic activity  10. Therapeutic exercise     TREATMENT PLAN: Frequency/Duration: Follow patient 3 times per week to address above goals. Rehabilitation Potential For Stated Goals: Excellent     REHAB RECOMMENDATIONS (at time of discharge pending progress):    Placement: It is my opinion, based on this patient's performance to date, that Mr. Radha Roldan may benefit from intensive therapy at an [facility] after discharge due to potential to make ongoing and sustainable functional improvement that is of practical value. Fabi Muñoz Pt functioning far below independent baseline, demonstrating good improvement and participation. Pt would likely benefit greatly and increase independence from inpatient rehab stay.    Equipment:    TBD               OCCUPATIONAL PROFILE AND HISTORY: History of Present Injury/Illness (Reason for Referral):  See H&P  Past Medical History/Comorbidities:   Mr. Ellis Nair  has a past medical history of Acute ischemic stroke ([address] (12/12/2019), Acute pancreatitis (11/19/2014), Cerebrovascular accident (CVA) due to embolism ([address] (12/12/2019), Diabetes ([address] (2002), Diabetes ([address] Diabetes mellitus, and Hypertension. He also has no past medical history of Arthritis, Asthma, Autoimmune disease ([address] CAD (coronary artery disease), Cancer ([address] Chronic kidney disease, COPD, Dementia, Dementia ([address] Heart failure ([address] Ill-defined condition, Infectious disease, Liver disease, Other ill-defined conditions(799.89), Psychiatric disorder, PUD (peptic ulcer disease), Seizures ([address] or Sleep disorder. Mr. Ellis Nair  has a past surgical history that includes hx hernia repair and hx orthopaedic. Social History/Living Environment:   Home Environment: Apartment  # Steps to Enter: 12  Rails to Enter: Yes  [employer] : Bilateral  One/Two Story Residence: One story  Living Alone: No  Support Systems: Spouse/Significant Other/Partner  Patient Expects to be Discharged to[de-identified] Unknown  Current DME Used/Available at Home: None  Tub or Shower Type: Tub/Shower combination  Prior Level of Function/Work/Activity:  Pt lives at home with his wife. Pt is typically independent with ADL/functional mobility. Pt does not drive. Pt was working part-time at [email or web address]. Personal Factors:          Age:  53 y.o.         Past/Current Experience:  CVA with flaccid L side        Other factors that influence how disability is experienced by the patient:  multiple co-morbidities    Number of Personal Factors/Comorbidities that affect the Plan of Care: Extensive review of physical, cognitive, and psychosocial performance (3+):  HIGH COMPLEXITY   ASSESSMENT OF OCCUPATIONAL PERFORMANCE[de-identified]   Activities of Daily Living:   Basic ADLs (From Assessment) Complex ADLs (From Assessment)   Feeding: Setup  Oral Facial Hygiene/Grooming: Moderate assistance  Bathing: Moderate assistance  Upper Body Dressing: Maximum assistance  Lower Body Dressing: Total assistance  Toileting: Total assistance Instrumental ADL  Meal Preparation: Total assistance  Homemaking: Total assistance   Grooming/Bathing/Dressing Activities of Daily Living   Grooming  Washing Face: Set-up; Supervision Cognitive Retraining  Safety/Judgement: Fall prevention   Upper Body Bathing  Bathing Assistance: Min A   Position Performed: Seated in chair      Lower Body Bathing  Bathing Assistance: Minimum assistance  Lower Body : Minimum assistance     Upper Body Dressing Assistance  Dressing Assistance: Minimum assistance  Hospital Gown: Minimum  assistance     Lower Body Dressing Assistance  Socks: Total assistance (dependent) Bed/Mat Mobility  Supine to Sit: Minimum assistance  Sit to Stand: Minimum assistance  Bed to Chair: Minimum assistance     Most Recent Physical Functioning:   Gross Assessment:                  Posture:     Balance:  Sitting: Intact  Standing: Pull to stand; With support Bed Mobility:  Supine to Sit: Minimum assistance  Wheelchair Mobility:     Transfers:  Sit to Stand: Minimum assistance  Bed to Chair: Minimum assistance            Patient Vitals for the past 6 hrs:   BP BP Patient Position SpO2 Pulse   02/27/20 0800 141/89 At rest 98 % 73   02/27/20 1200 (!) 146/98 Sitting 100 % 80       Mental Status  Neurologic State: Alert  Orientation Level: Oriented to person  Cognition: Follows commands  Perception: Verbal, Tactile  Perseveration: Tactile cues provided, Verbal cues provided  Safety/Judgement: Fall prevention                          Physical Skills Involved:  1. Range of Motion  2. Balance  3. Strength  4. Activity Tolerance  5. Fine Motor Control  6. Gross Motor Control Cognitive Skills Affected (resulting in the inability to perform in a timely and safe manner):  1. Perception  2.  Expression Psychosocial Skills Affected:  1. Habits/Routines  2. Environmental Adaptation  3. Social Interaction  4. Emotional Regulation  5. Self-Awareness  6. Awareness of Others  7. Social Roles   Number of elements that affect the Plan of Care: 5+:  HIGH COMPLEXITY   CLINICAL DECISION MAKIN[address] [place] [address] AM-PAC 6 Clicks   Daily Activity Inpatient Short Form  How much help from another person does the patient currently need. .. Total A Lot A Little None   1. Putting on and taking off regular lower body clothing? [x] 1   [] 2   [] 3   [] 4   2. Bathing (including washing, rinsing, drying)? [] 1   [x] 2   [] 3   [] 4   3. Toileting, which includes using toilet, bedpan or urinal?   [] 1   [x] 2   [] 3   [] 4   4. Putting on and taking off regular upper body clothing? [] 1   [x] 2   [] 3   [] 4   5. Taking care of personal grooming such as brushing teeth? [] 1   [x] 2   [] 3   [] 4   6. Eating meals? [] 1   [] 2   [x] 3   [] 4   © , Trustees of [address] [place] [address], under license to [employer]. All rights reserved      Score:  Initial: 11 Most Recent: 12 (Date: 2020 )    Interpretation of Tool:  Represents activities that are increasingly more difficult (i.e. Bed mobility, Transfers, Gait). Medical Necessity:     · Patient demonstrates   · good and excellent  ·  rehab potential due to higher previous functional level. Reason for Services/Other Comments:  · Patient continues to require skilled intervention due to   · Decreased independence with ADL/functional transfers that impacts overall quality of life.    · .   Use of outcome tool(s) and clinical judgement create a POC that gives a: MODERATE COMPLEXITY         TREATMENT:   (In addition to Assessment/Re-Assessment sessions the following treatments were rendered)     Pre-treatment Symptoms/Complaints:    Pain: Initial:   Pain Intensity 1: 0  Post Session:  0     Self Care: (23): Procedure(s) (per grid) utilized to improve and/or restore self-care/home management as related to dressing, bathing and grooming. Required min verbal and manual cueing to facilitate activities of daily living skills. Date:  1/27/2020 Date:  2/11/2020 Date:   2/21/2020 2/27/2020      Activity/Exercise Parameters Parameters Parameters    LUE finger flexion, AROM gravity minimized then AAROM to complete ROM 2x10 reps 2x10 reps 3 x's 10 reps 3 x's 10 reps   LUE finger extension AAROM 2x10 reps 2x10 reps 3 x's 10 reps 3 x's 10 reps   LUE wrist extension, AROM gravity minimized then AAROM to complete ROM 1x10 reps 1x10 reps 3 x's 10 reps 3 x's 10 reps   LUE wrist extension, AROM against gravity  1x10 reps 1x10 reps 3 x's 10 reps 3 x's 10 reps   LUE wrist flexion AAROM 2x10 reps 1x10 reps 3 x's 10 reps 3 x's 10 reps   LUE elbow flexion/extension PROM x10 reps 1x10 reps 3 x's 10 reps 3 x's 10 reps   LUE shoulder flexion PROM x10 reps 1x10 reps 3 x's 10 reps 3 x's 10 reps        Braces/Orthotics/Lines/Etc:   · O2 device: Room air  · Treatment/Session Assessment:    · Response to Treatment:  Pt demonstrated good participation. · Interdisciplinary Collaboration:   o Certified Occupational Therapy Assistant  o Registered Nurse  · After treatment position/precautions:   o Up in chair  o Bed/Chair-wheels locked  o Bed in low position  o Call light within reach  o RN notified   · Compliance with Program/Exercises: Compliant all of the time, Will assess as treatment progresses. · Recommendations/Intent for next treatment session: \"Next visit will focus on advancements to more challenging activities and reduction in assistance provided\".   Total Treatment Duration:  OT Patient Time In/Time Out  Time In: 0950  Time Out: [address]

## 2020-02-27 NOTE — PROGRESS NOTES
Neurolinguistic Goals:  LTG: Patient will increase neuro-linguistic abilities to increase safety and awareness in functional living environment   STG: Patient will solve mathematical problems with 80%accuracy given minimal cueing   STG: Patient will name 10 items to concrete category in 1 minute given minimal cueing. Progressing 1/31/2020  STG: Patient will participate in verbal reasoning tasks with 80% accuracy given minimal cueing  STG: Patient will complete mental manipulation tasks with 80% accuracy given minimal cueing  STG: Patient will complete working memory/attention tasks with 80% accuracy given minimal cueing  STG: Patient will recall relevant verbal information with 80% accuracy with minimal assistance  STG: Patient will utilize memory compensatory strategies to improve recall of functional list/message with 90%accuracy given minimal assistance  STG: Patient will participate in ongoing cognitive linguistic evaluation for therapeutic assessment. MET 1/31/2020     Dysarthria Goals:  LTG: Patient will develop functional and intelligible speech and utilize compensatory strategies to improve communication across environments  STG: Patient will utilize compensatory strategies at conversation level with 90% accuracy independently. Added 1/31/2020    SPEECH LANGUAGE PATHOLOGY: SPEECH-LANGUAGE/COGNITION: Daily Note 7    NAME/AGE/GENDER: Jade Monge is a 53 y.o. male  DATE: 2/27/2020  PRIMARY DIAGNOSIS: Acute ischemic stroke ([facility] [I63.9]  Cerebrovascular accident (CVA) due to embolism ([facility] [I63.9]      ICD-10: Treatment Diagnosis: R47.1 Dysarthria and Anarthria  R41.841 Cognitive-Communication Deficit    INTERDISCIPLINARY COLLABORATION: n/a  PRECAUTIONS/ALLERGIES: Patient has no known allergies. SUBJECTIVE   Patient sitting in wheelchair upon entry. Problem List:  (Impairments causing functional limitations):  1. Dysarthria  2.  Cognitive linguistic impaired        Pain: Pain Scale 1: Numeric (0 - 10)  Pain Intensity 1: 0     OBJECTIVE   Patient completed the following therapeutic tasks targeting working and short term memory-    Answered basic questions about telephone messages read aloud by therapist-  Message 1: 1/5 independently; 2/5 with phonemic cue  Message 2: 1/5;  with rehearsal-2/5  Message 3: 3/5; with rehearsal-3/5       ASSESSMENT   Patient continues to demonstrate significant deficits in working and short term memory. Reviewed memory strategies again, including repeat, associate, visualize, however patient still demonstrated significantly impaired recall. Patient able to recall subject of each message, but increased difficulty with specific details such as names, times, dates. Also decreased reasoning at times. For example, given one message regarding meeting for dinner, patient stating \"1\" and then \"3\" despite being able to recall the subject of the message being dinner. Discussed importance of asking for repetition of key details including, where, what, when, etc then repeating back to speaker to ensure accuracy. Patient will benefit from ongoing skilled intervention to improve functional independence as patient continues to function significantly below baseline. Tool Used: MODIFIED BRITT SCALE (mRS)   Score   No Symptoms  [] 0   No significant disability despite symptoms; able to carry out all usual duties and activities  [] 1   Slight disability; unable to carry out all previous activities but able to look after own affairs without assistance.    [x] 2   Moderate disability; requiring some help but able to walk without assistance  [] 3   Moderately severe disability; unable to walk without assistance and unable to attend to own bodily needs without assistance  [] 4   Severe disability; bedridden, incontinent, and requiring constant nursing care and attention  [] 5      Score:  Initial: 2 Most Recent: 3 (Date 02/11/20 )   Interpretation of Tool: The Modified [place] Scale is a 7-point scaled used to quantify level of disability as it relates to a patient's functional abilities. PLAN    FREQUENCY/DURATION: Continue to follow patient 2 times a week for duration of hospital stay to address above goals. - Recommendations for next treatment session: Next treatment will address cognition   REHABILITATION POTENTIAL FOR STATED GOALS: Good           RECOMMENDATIONS   STRATEGIES:   · Memory strategies  · Dysarthria strategies     EDUCATION:  · Recommendations discussed with Patient     RECOMMENDATIONS for CONTINUED SPEECH THERAPY: YES: Anticipate need for ongoing speech therapy during this hospitalization and at next level of care. Compliance with Program/Exercises: Will assess as treatment progresses  Continuation of Skilled Services/Medical Necessity:   Patient is expected to demonstrate progress in cognitive ability to decrease assistance required communication, increase independence with activities of daily living and increase communication with family/caregivers.  Patient continues to require skilled intervention due to impaired cognitive linguistic abilities.      SAFETY:  After treatment position/precautions:  · Call light within reach  · upright in wheelchair    Total Treatment Duration:   Time In: 1045  Time Out: Jim [address], [facility], ALFONZO ARRIAZA, CCC-SLP

## 2020-02-27 NOTE — PROGRESS NOTES
Problem: Mobility Impaired (Adult and Pediatric)  Goal: *Acute Goals and Plan of Care  Description  GOALS UPDATED ON RE-ASSESSMENT 2/26/2020  1. Patient will perform bed mobility with supervision and 0 verbal cues within 7 treatment days. 2. Patient will perform STS transfer with CGA within 7 treatment days. 3. Patient will perform bed to (wheel) chair transfer with CGA with 7 treatment days. 4. Patient will demonstrate fair+ dynamic balance throughout stance phase on L LE within 7 treatment days. 5. Patient will require CGA throughout swing phase on (to advance) L LE within 7 treatment days. 6. Patient demonstrate 3+/5 strength in L LE hip flexion, hip abduction, and hip adduction) within 7 treatment days. 7. Patient will ambulate 100ft+ with CGA and least retrictive assistive device within 7 treatment days. PERVIOUSLY MET GOALS:        PHYSICAL THERAPY: Daily Note and PM 2/27/2020  INPATIENT: PT Visit Days : 2  Payor: MEDICAID PENDING / Plan: [employer] PENDING / Product Type: Medicaid /       NAME/AGE/GENDER: Jade Monge is a 53 y.o. male   PRIMARY DIAGNOSIS: Acute ischemic stroke ([address] [I63.9]  Cerebrovascular accident (CVA) due to embolism ([address] [H42.4] Acute embolic stroke ([address] Acute embolic stroke ([address]       ICD-10: Treatment Diagnosis:   · Difficulty in walking, Not elsewhere classified (R26.2)  · Hemiplegia and hemiparesis following cerebral infarction affecting left non-dominant side (W27.797)   Precaution/Allergies:  Patient has no known allergies. ASSESSMENT:     Mr. Annie Sykes is a 53year old admitted R MCA CVA. Patient is currently demonstrating trace quad activation, 3-/5 hip flexion, 3-/5 hip adduction, 2+/5 hip abduction, and 2+/5 hamstring/knee flexion. The patient is supine in the bed upon contact and agreeable to PT treatment this PM.  The patient performed bed mobility training with cues for rolling, sequencing, and movement of the LUE and LLE.   He performed multiple reps of supine<>sit on the right side of the bed with min A progressing to CGA with max verbal cues for technique and positioning. The patient then performed sit to stand with min A-CGA and stand pivot transfer to the w/c with CGA. He sat with CGA and verbal cues for safety awareness and technique. The patient then requested to ambulate and performed sit to stand with CGA and gait trained 40' with the luke-walker and min-mod A, with constant verbal cues for taking a \"big step\" on the LLE and for sequencing the luke-walker and the LEs. The patient then sat in the wheel chair and took an extended seated rest break. He then performed sit to stand and gait trained an additional 40' using the hallway handrail with verbal cues for advancing the LLE, maintaining upright posture, and safety/fatigue awareness. He sat in the w/c for another rest break before standing and gait training a third 36' with the luke-walker and min-mod A. As he fatigued, knee buckling/hyperextension in the left knee worsened requiring increased cueing for awareness of the LLE. The patient took another seated rest break and the performed w/c mobility with min A/CGA and verbal/demonstration cues for using BLE to advance the w/c and the RUE to navigate. The patient did well for a few feet at a time, but was easily distracted allowing the LLE to drag. He mobilized for 36' with the w/c requiring constant cues for awareness and use of the LLE. The patient was then returned to his room and performed stand pivot transfer from the w/c to the bed with min A and verbal cues for safety. He performed sit to supine with CGA and verbal cues for technique and motivation. Overall the patient is making good progress toward therapy goals as indicated by increased gait distances and improved transfers. Will continue skilled PT treatment as patient is below functional baseline.         This section established at most recent assessment   PROBLEM LIST (Impairments causing functional limitations):  1. Decreased Strength  2. Decreased ADL/Functional Activities  3. Decreased Transfer Abilities  4. Decreased Ambulation Ability/Technique  5. Decreased Balance  6. Increased Pain  7. Decreased Activity Tolerance  8. Increased Fatigue  9. Decreased Flexibility/Joint Mobility   INTERVENTIONS PLANNED: (Benefits and precautions of physical therapy have been discussed with the patient.)  1. Balance Exercise  2. Bed Mobility  3. Family Education  4. Gait Training  5. Home Exercise Program (HEP)  6. Manual Therapy  7. Neuromuscular Re-education/Strengthening  8. Range of Motion (ROM)  9. Therapeutic Activites  10. Therapeutic Exercise/Strengthening  11. Transfer Training     TREATMENT PLAN: Frequency/Duration: 3 times a week for duration of hospital stay  Rehabilitation Potential For Stated Goals: Good     REHAB RECOMMENDATIONS (at time of discharge pending progress):    Placement: It is my opinion, based on this patient's performance to date, that Mr. Fiorella Urbina may benefit from intensive therapy at an [address] [place] [address] after discharge due to a probable need for close medical supervision by a rehab physician, a probable need for multiple therapy disciplines and potential to make ongoing and sustainable functional improvement that is of practical value. .  Equipment:    TBD pending progress with therapy. HISTORY:   History of Present Injury/Illness (Reason for Referral):  Per H&P: \"Pt is a 54 y/o smoker with DM, HTN, who presented to ER with L leg and arm weakness, L facial droop, dysarthria. First noted L leg weakness late night 12/11 when he woke up to go to the bathroom. He was normal when he went to bed around 1030. Woke up this morning and had persistent weakness L leg and also now noted in L arm. EMS called. Noted to have slurred speech and L facial droop as well.   Code S called in ER around 9am.  MRI with acute infarcts in L cerebellar hemisphere, deep frontoparietal white matter, and R paramedian yariel. Also noted old lacunar infarcts. No large vessel occlusion on CTA, but some stenosis noted. No hx afib, TIA, CVA. No CP, palpitations, SOB. \"  Past Medical History/Comorbidities:   Mr. Jonathon Smith  has a past medical history of Acute ischemic stroke ([address] (12/12/2019), Acute pancreatitis (11/19/2014), Cerebrovascular accident (CVA) due to embolism ([address] (12/12/2019), Diabetes ([address] (2002), Diabetes ([address] Diabetes mellitus, and Hypertension. He also has no past medical history of Arthritis, Asthma, Autoimmune disease ([address] CAD (coronary artery disease), Cancer ([address] Chronic kidney disease, COPD, Dementia, Dementia ([address] Heart failure ([address] Ill-defined condition, Infectious disease, Liver disease, Other ill-defined conditions(799.89), Psychiatric disorder, PUD (peptic ulcer disease), Seizures ([address] or Sleep disorder. Mr. Jonathon Smith  has a past surgical history that includes hx hernia repair and hx orthopaedic. Social History/Living Environment:   Home Environment: Apartment  # Steps to Enter: 12  Rails to Enter: Yes  [employer] : Bilateral  One/Two Story Residence: One story  Living Alone: No  Support Systems: Spouse/Significant Other/Partner  Patient Expects to be Discharged to[de-identified] Unknown  Current DME Used/Available at Home: None  Tub or Shower Type: Tub/Shower combination  Prior Level of Function/Work/Activity:  Independent, lives with wife in 2nd story 1 level apartment. No recent falls. Number of Personal Factors/Comorbidities that affect the Plan of Care: 1-2: MODERATE COMPLEXITY   EXAMINATION:   Most Recent Physical Functioning:   Gross Assessment:                  Posture:     Balance:  Sitting: Intact  Standing: Pull to stand; With support  Standing - Static: Fair  Standing - Dynamic : Poor;Fair;Constant support Bed Mobility:  Supine to Sit: Minimum assistance;Contact guard assistance  Sit to Supine: Contact guard assistance  Scooting: Contact guard assistance  Interventions: Safety awareness training;Verbal cues  Wheelchair Mobility:  Distance (ft): 30 feet  Time Required for Task: 10 minutes  Level of Assistance: Minimum assistance  Interventions: Verbal cues;Demonstration;Manual cues  Transfers:  Sit to Stand: Minimum assistance;Contact guard assistance  Stand to Sit: Contact guard assistance  Interventions: Verbal cues; Safety awareness training  Gait:     Base of Support: Center of gravity altered  Speed/Clotilde: Delayed; Slow  Step Length: Right shortened;Left shortened  Gait Abnormalities: Hemiplegic  Distance (ft): 40 Feet (ft)(x3)  Assistive Device: Gait belt;Walker luke(hallway hand rail for 20')  Ambulation - Level of Assistance: Minimal assistance  Interventions: Safety awareness training;Verbal cues      Body Structures Involved:  1. Nerves  2. Voice/Speech  3. Bones  4. Joints  5. Muscles Body Functions Affected:  1. Mental  2. Sensory/Pain  3. Neuromusculoskeletal  4. Movement Related Activities and Participation Affected:  1. General Tasks and Demands  2. Mobility  3. Self Care  4. Interpersonal Interactions and Relationships   Number of elements that affect the Plan of Care: 4+: HIGH COMPLEXITY   CLINICAL PRESENTATION:   Presentation: Evolving clinical presentation with changing clinical characteristics: MODERATE COMPLEXITY   CLINICAL DECISION MAKIN [facility] Mobility Inpatient Short Form  How much difficulty does the patient currently have. .. Unable A Lot A Little None   1. Turning over in bed (including adjusting bedclothes, sheets and blankets)? [] 1   [] 2   [x] 3   [] 4   2. Sitting down on and standing up from a chair with arms ( e.g., wheelchair, bedside commode, etc.)   [] 1   [] 2   [x] 3   [] 4   3. Moving from lying on back to sitting on the side of the bed? [] 1   [] 2   [x] 3   [] 4   How much help from another person does the patient currently need. .. Total A Lot A Little None   4. Moving to and from a bed to a chair (including a wheelchair)? [] 1   [] 2   [x] 3   [] 4   5. Need to walk in hospital room? [] 1   [x] 2   [] 3   [] 4   6. Climbing 3-5 steps with a railing? [] 1   [x] 2   [] 3   [] 4   © 2007, Trustees of [facility] [employer], under license to [employer]. All rights reserved      Score:  Initial: 13 Most Recent: 16 (Date:2/26/2020)    Interpretation of Tool:  Represents activities that are increasingly more difficult (i.e. Bed mobility, Transfers, Gait). Medical Necessity:     · Patient is expected to demonstrate progress in   · strength, range of motion, balance, coordination, and functional technique  ·  to   · increase independence with all mobility. · .  Reason for Services/Other Comments:  · Patient continues to require skilled intervention due to   · medical complications and mobility deficits which impact his level of function, safety, and independence as indicated above. · .   Use of outcome tool(s) and clinical judgement create a POC that gives a: Questionable prediction of patient's progress: MODERATE COMPLEXITY        TREATMENT:      Pre-treatment Symptoms/Complaints:  none  Pain: Initial: 0/10 Post Session:  0/10     Physical Therapy Re-Evaluation (untimed charge)    Gait Training (  20 Minutes):  Gait training to improve and/or restore physical functioning as related to mobility, strength and balance. Ambulated 40 Feet (ft)(x3) with Minimal assistance using a Gait belt;Walker luke(hallway hand rail for 20') and moderate Safety awareness training;Verbal cues related to their sequencing, knee positioning, foot placement, step length, weight shifts, luke walker placement, and posture to promote proper body alignment, promote proper body posture and promote proper body mechanics. Instruction in performance of the above deficits to correct overall gait sequencing and quality.     Therapeutic Activity: (    25 min): Therapeutic activities including Bed transfers, Chair transfers, supine<>sit transfer training and SPT to w/c training to improve mobility, strength and balance. Required moderate Safety awareness training;Verbal cues to promote static and dynamic balance in standing and promote motor control of left, upper extremity(s), lower extremity(s). Braces/Orthotics/Lines/Etc:   · Sling to L UE throughout transfers and ambulation  Treatment/Session Assessment:    · Response to Treatment:  See above  · Interdisciplinary Collaboration:   o Physical Therapy Assistant  o Registered Nurse  o Rehabilitation Attendant  · After treatment position/precautions:   o Supine in bed  o Bed/Chair-wheels locked  o Bed in low position  o Call light within reach  o RN notified  o Patient taken to garden and [facility] throughout treatment; primary RN notified; unit secreatary signed patient out and returned on arrival to floor unit secretary and primary RN notified. · Compliance with Program/Exercises: Compliant all of the time  · Recommendations/Intent for next treatment session: \"Next visit will focus on advancements to more challenging activities and reduction in assistance provided\".     Total Treatment Duration:  PT Patient Time In/Time Out  Time In: 1355  Time Out: [address], PTA

## 2020-02-27 NOTE — PROGRESS NOTES
Hospitalist Progress Note     Admit Date:  2019  9:07 AM   Name:  Carlos Strauss   Age:  53 y.o.  :  1975   MRN:  [id number]   PCP:  Delgado Ochoa MD  Treatment Team: Attending Provider: Dirk Navarrete MD; Care Manager: Royal Eli RN; Care Manager: Nicole Telles LMSW; Utilization Review: Celestina Garcia RN; Nurse Practitioner: Madelin Serrano NP; Charge Nurse: Lisseth Lou; Physical Therapy Assistant: Stefania Gutierrez PTA    Subjective:   52 yo male with PMH of DM II, HTN who presented 19 with c/o left leg and arm weakness, left facial droop and dysarthria.  Code S called. MRI with acute infarctions in left cerebellar hemisphere, deep frontoparietal white matter and right paramedian yariel.  Also noted to have old lacunar infarcts. CTA without large vessel occlusions, however some stenosis noted.  Echo showed PFO, Cardiology to f/u outpatient for evaluation for closure.  Neurology consulted.  Started on ASA, statin.  Pt is uninsured, difficult placement, case management working on plan.     Today: No acute events. No concerns per patient. I discussed again with  who has informed me there are currently no options for the patient to be safely discharged given his lack of funding. Nursing notes and chart reviewed. Review of Systems negative with exception of pertinent positives noted above.       Objective:     Patient Vitals for the past 24 hrs:   Temp Pulse Resp BP SpO2   20 1200 97.4 °F (36.3 °C) 80 18 (!) 146/98 100 %   20 0800 97.8 °F (36.6 °C) 73 18 141/89 98 %   20 0400 97.7 °F (36.5 °C) 75 18 (!) 148/92 98 %   20 0000 98.1 °F (36.7 °C) 74 18 143/89 95 %   20 2000 97.9 °F (36.6 °C) 70 18 129/83 98 %   20 1554 97.9 °F (36.6 °C) 66 17 123/80 95 %     Oxygen Therapy  O2 Sat (%): 100 % (20 1200)  Pulse via Oximetry: 66 beats per minute (20)  O2 Device: Room air (20)  No intake or output data in the 24 hours ending 02/27/20 1423      General:    Well nourished. Alert. LUE unchanged from previous exam, reading Bible in bed  CV:   RRR. No murmur, rub, or gallop. Lungs:   CTAB. No wheezing, rhonchi, or rales. Abdomen:   Soft, nontender, nondistended. Extremities: Warm and dry. No cyanosis or edema. Skin:     No rashes or jaundice. Data Review:  I have reviewed all labs, meds, telemetry events, and studies from the last 24 hours.     Recent Results (from the past 24 hour(s))   GLUCOSE, POC    Collection Time: 02/27/20  8:24 AM   Result Value Ref Range    Glucose (POC) 134 (H) 65 - 100 mg/dL        All Micro Results     None          Current Meds:  Current Facility-Administered Medications   Medication Dose Route Frequency    metFORMIN (GLUCOPHAGE) tablet 1,000 mg  1,000 mg Oral DAILY WITH DINNER    metFORMIN (GLUCOPHAGE) tablet 500 mg  500 mg Oral DAILY WITH BREAKFAST    diphenhydrAMINE (BENADRYL) capsule 25 mg  25 mg Oral Q6H PRN    acetaminophen (TYLENOL) tablet 650 mg  650 mg Oral Q8H    lip protectant (BLISTEX) ointment 1 Each  1 Each Topical PRN    metoprolol succinate (TOPROL-XL) XL tablet 25 mg  25 mg Oral DAILY    polyethylene glycol (MIRALAX) packet 17 g  17 g Oral DAILY PRN    guaiFENesin (ROBITUSSIN) 100 mg/5 mL oral liquid 100 mg  100 mg Oral Q4H PRN    hydrALAZINE (APRESOLINE) 20 mg/mL injection 20 mg  20 mg IntraVENous Q4H PRN    atorvastatin (LIPITOR) tablet 80 mg  80 mg Oral QHS    lisinopril (PRINIVIL, ZESTRIL) tablet 40 mg  40 mg Oral DAILY    sodium chloride (NS) flush 5-40 mL  5-40 mL IntraVENous PRN    ondansetron (ZOFRAN) injection 4 mg  4 mg IntraVENous Q4H PRN    aspirin chewable tablet 81 mg  81 mg Oral DAILY    enoxaparin (LOVENOX) injection 40 mg  40 mg SubCUTAneous Q24H    dextrose 40% (GLUTOSE) oral gel 1 Tube  15 g Oral PRN    glucagon (GLUCAGEN) injection 1 mg  1 mg IntraMUSCular PRN    dextrose (D50W) injection syrg 12.5-25 g  25-50 mL IntraVENous PRN       Other Studies (last 24 hours):  No results found. Assessment and Plan:     Hospital Problems as of 2/27/2020 Date Reviewed: 3/3/2017          Codes Class Noted - Resolved POA    PFO (patent foramen ovale) ICD-10-CM: Q21.1  ICD-9-CM: 745.5  12/17/2019 - Present Yes        Arrhythmia ICD-10-CM: I49.9  ICD-9-CM: 427.9  12/15/2019 - Present Yes        Hyperlipemia ICD-10-CM: E78.5  ICD-9-CM: 272.4  12/15/2019 - Present Yes        * (Principal) Acute embolic stroke [facility]) [facility]-12-HK: I63.9  ICD-9-CM: 434.11  12/12/2019 - Present Yes        Hypertension (Chronic) ICD-10-CM: I10  ICD-9-CM: 401.9  Unknown - Present Yes        Type 2 diabetes mellitus (HCC) (Chronic) ICD-10-CM: E11.9  ICD-9-CM: 250.00  Unknown - Present Yes              PLAN:    Acute embolic stroke  -is \"outpatient ready\" so no routine labs indicated at this point  -MRI with acute infarcts in L cerebellar hemisphere, deep frontoparietal white matter, and R paramedian yariel. old lacunar infarcts. -ISMAEL: 3 mm PFO, Cardiology stated he needs an evaluation for closure PFO with high risk features including significant (3 mm) separation of septum secundum and primum as well as large shunt.  Will await recovery of CVA.  Plan is 4 week event monitor as outpatient. Graciela Tipton then see in office and if no arrhythmias, will plan PFO closure  -continue statin, ASA     Hypertension:   -lisinopril, metoprolol     Type 2 diabetes mellitus:    -Patient currently on metformin 1500 mg daily, escalate to 2000 mg daily on 3/3/2020     Left upper extremity pain  -titrate down tylenol from q6h to q8h (2/23)     Allergies  Benadryl for allergies as needed    DISPO: waiting for pt to be deemed \"disabled\" which in neurologic cases have to allow for 6 months showing chronicity.  Then he can get medicaid after that. Medically stable for discharge.    DVT ppx:  lovenox   Signed:  Sancho Camargo MD

## 2020-02-28 PROCEDURE — 65270000029 HC RM PRIVATE

## 2020-02-28 PROCEDURE — 97530 THERAPEUTIC ACTIVITIES: CPT

## 2020-02-28 PROCEDURE — 97535 SELF CARE MNGMENT TRAINING: CPT

## 2020-02-28 PROCEDURE — 74011250637 HC RX REV CODE- 250/637: Performed by: INTERNAL MEDICINE

## 2020-02-28 PROCEDURE — 74011250637 HC RX REV CODE- 250/637: Performed by: FAMILY MEDICINE

## 2020-02-28 PROCEDURE — 97110 THERAPEUTIC EXERCISES: CPT

## 2020-02-28 PROCEDURE — 97112 NEUROMUSCULAR REEDUCATION: CPT

## 2020-02-28 PROCEDURE — 74011250637 HC RX REV CODE- 250/637: Performed by: HOSPITALIST

## 2020-02-28 PROCEDURE — 74011250636 HC RX REV CODE- 250/636: Performed by: INTERNAL MEDICINE

## 2020-02-28 PROCEDURE — 97116 GAIT TRAINING THERAPY: CPT

## 2020-02-28 RX ADMIN — ATORVASTATIN CALCIUM 80 MG: 80 TABLET, FILM COATED ORAL at 21:08

## 2020-02-28 RX ADMIN — ENOXAPARIN SODIUM 40 MG: 40 INJECTION SUBCUTANEOUS at 14:52

## 2020-02-28 RX ADMIN — METFORMIN HYDROCHLORIDE 1000 MG: 500 TABLET ORAL at 17:36

## 2020-02-28 RX ADMIN — METFORMIN HYDROCHLORIDE 500 MG: 500 TABLET ORAL at 08:42

## 2020-02-28 RX ADMIN — GUAIFENESIN 100 MG: 100 SOLUTION ORAL at 14:52

## 2020-02-28 RX ADMIN — ACETAMINOPHEN 650 MG: 325 TABLET, FILM COATED ORAL at 05:36

## 2020-02-28 RX ADMIN — DIPHENHYDRAMINE HYDROCHLORIDE 25 MG: 25 CAPSULE ORAL at 17:40

## 2020-02-28 RX ADMIN — ACETAMINOPHEN 650 MG: 325 TABLET, FILM COATED ORAL at 21:08

## 2020-02-28 RX ADMIN — ASPIRIN 81 MG 81 MG: 81 TABLET ORAL at 08:42

## 2020-02-28 RX ADMIN — LISINOPRIL 40 MG: 20 TABLET ORAL at 08:42

## 2020-02-28 RX ADMIN — METOPROLOL SUCCINATE 25 MG: 25 TABLET, FILM COATED, EXTENDED RELEASE ORAL at 08:42

## 2020-02-28 RX ADMIN — ACETAMINOPHEN 650 MG: 325 TABLET, FILM COATED ORAL at 14:52

## 2020-02-28 NOTE — PROGRESS NOTES
02/28/20 0658   NIH Stroke Scale   Interval Other (comment)   LOC 0   LOC Questions 0   LOC Commands 0   Best Gaze 0   Visual 0   Facial Palsy 1   Motor Right Arm 0   Motor Left Arm 2   Motor Right Leg 0   Motor Left Leg 2   Limb Ataxia 0   Sensory 0   Best Language 1   Dysarthria 1   Extinction and Inattention 0   Total 7   Dual NIH with Al RN

## 2020-02-28 NOTE — PROGRESS NOTES
Problem: Mobility Impaired (Adult and Pediatric)  Goal: *Acute Goals and Plan of Care  Description  GOALS UPDATED ON RE-ASSESSMENT 2/26/2020  1. Patient will perform bed mobility with supervision and 0 verbal cues within 7 treatment days. 2. Patient will perform STS transfer with CGA within 7 treatment days. 3. Patient will perform bed to (wheel) chair transfer with CGA with 7 treatment days. 4. Patient will demonstrate fair+ dynamic balance throughout stance phase on L LE within 7 treatment days. 5. Patient will require CGA throughout swing phase on (to advance) L LE within 7 treatment days. 6. Patient demonstrate 3+/5 strength in L LE hip flexion, hip abduction, and hip adduction) within 7 treatment days. 7. Patient will ambulate 100ft+ with CGA and least retrictive assistive device within 7 treatment days. PERVIOUSLY MET GOALS:        PHYSICAL THERAPY: Daily Note and AM 2/28/2020  INPATIENT: PT Visit Days : 3  Payor: MEDICAID PENDING / Plan: [employer] PENDING / Product Type: Medicaid /       NAME/AGE/GENDER: Ray Nevarez is a 53 y.o. male   PRIMARY DIAGNOSIS: Acute ischemic stroke ([address] [I63.9]  Cerebrovascular accident (CVA) due to embolism ([address] [U25.4] Acute embolic stroke ([address] Acute embolic stroke ([address]       ICD-10: Treatment Diagnosis:   · Difficulty in walking, Not elsewhere classified (R26.2)  · Hemiplegia and hemiparesis following cerebral infarction affecting left non-dominant side (Y30.885)   Precaution/Allergies:  Patient has no known allergies. ASSESSMENT:     Mr. Antony Murray is a 53year old admitted R MCA CVA. Patient is currently demonstrating trace quad activation, 3-/5 hip flexion, 3-/5 hip adduction, 2+/5 hip abduction, and 2+/5 hamstring/knee flexion. The patient is supine in bed upon contact and agreeable to PT treatment. The patient performed functional exercises to improve independence and mobility.   He performed supine<>sit multiple times with verbal/visual cueing and min A progressing to SBA. He required seated rest breaks between sets to recover and cues for log rolling, arm positioning, and technique to improve functionality and strength. The patient then performed multiple sit<>stand transfers and SPT to and from the w/c to improve strength, mobility, and safety awareness with min A to CGA and verbal cues throughout. The patient was then taken down to the lobby floor (checked out and back in with the RN and floor ) to perform gait training on different surfaces and with increased distractions. The patient stood from the w/c with min A and gait trained 30' on carpet with min-mod A and max verbal cues for advancement of the LLE and awareness of foot position, knee position, posture, and the changes from ambulating on carpet. The patient then took a brief seated rest break before ambulating an additional 48' with the luke walker and min A on hard floor with cues as above. The patient then sat in the w/c and was returned to his room. He agreed to stay up in the w/c to eat and demonstrated good sitting balance and mobilization of LE for positioning. He was positioned with needs in reach and family present. Overall the patient is making good progress toward therapy goals as indicated by increased IND in bed mobility and transfers. Will continue skilled PT treatment as patient is still below functional baseline. At this time, patient is appropriate for Co-treatment with occupational therapy due to patient's decreased overall endurance/tolerance levels, as well as need for high level skilled assistance to complete functional transfers/mobility and functional tasks. Priti Leticia is appropriate for a multidisciplinary co-treatment of PT and OT to address goals of both disciplines. This section established at most recent assessment   PROBLEM LIST (Impairments causing functional limitations):  1. Decreased Strength  2.  Decreased ADL/Functional Activities  3. Decreased Transfer Abilities  4. Decreased Ambulation Ability/Technique  5. Decreased Balance  6. Increased Pain  7. Decreased Activity Tolerance  8. Increased Fatigue  9. Decreased Flexibility/Joint Mobility   INTERVENTIONS PLANNED: (Benefits and precautions of physical therapy have been discussed with the patient.)  1. Balance Exercise  2. Bed Mobility  3. Family Education  4. Gait Training  5. Home Exercise Program (HEP)  6. Manual Therapy  7. Neuromuscular Re-education/Strengthening  8. Range of Motion (ROM)  9. Therapeutic Activites  10. Therapeutic Exercise/Strengthening  11. Transfer Training     TREATMENT PLAN: Frequency/Duration: 3 times a week for duration of hospital stay  Rehabilitation Potential For Stated Goals: Good     REHAB RECOMMENDATIONS (at time of discharge pending progress):    Placement: It is my opinion, based on this patient's performance to date, that Mr. Wendy Fischer may benefit from intensive therapy at an [address] [place] [address] after discharge due to a probable need for close medical supervision by a rehab physician, a probable need for multiple therapy disciplines and potential to make ongoing and sustainable functional improvement that is of practical value. .  Equipment:    TBD pending progress with therapy. HISTORY:   History of Present Injury/Illness (Reason for Referral):  Per H&P: \"Pt is a 52 y/o smoker with DM, HTN, who presented to ER with L leg and arm weakness, L facial droop, dysarthria. First noted L leg weakness late night 12/11 when he woke up to go to the bathroom. He was normal when he went to bed around 1030. Woke up this morning and had persistent weakness L leg and also now noted in L arm. EMS called. Noted to have slurred speech and L facial droop as well. Code S called in ER around 9am.  MRI with acute infarcts in L cerebellar hemisphere, deep frontoparietal white matter, and R paramedian yariel.   Also noted old lacunar infarcts. No large vessel occlusion on CTA, but some stenosis noted. No hx afib, TIA, CVA. No CP, palpitations, SOB. \"  Past Medical History/Comorbidities:   Mr. Brianna Mcgarry  has a past medical history of Acute ischemic stroke ([address] (12/12/2019), Acute pancreatitis (11/19/2014), Cerebrovascular accident (CVA) due to embolism ([address] (12/12/2019), Diabetes ([address] (2002), Diabetes ([address] Diabetes mellitus, and Hypertension. He also has no past medical history of Arthritis, Asthma, Autoimmune disease ([address] CAD (coronary artery disease), Cancer ([address] Chronic kidney disease, COPD, Dementia, Dementia ([address] Heart failure ([address] Ill-defined condition, Infectious disease, Liver disease, Other ill-defined conditions(799.89), Psychiatric disorder, PUD (peptic ulcer disease), Seizures ([address] or Sleep disorder. Mr. Brianna Mcgarry  has a past surgical history that includes hx hernia repair and hx orthopaedic. Social History/Living Environment:   Home Environment: Apartment  # Steps to Enter: 12  Rails to Enter: Yes  [employer] : Bilateral  One/Two Story Residence: One story  Living Alone: No  Support Systems: Spouse/Significant Other/Partner  Patient Expects to be Discharged to[de-identified] Unknown  Current DME Used/Available at Home: None  Tub or Shower Type: Tub/Shower combination  Prior Level of Function/Work/Activity:  Independent, lives with wife in 2nd story 1 level apartment. No recent falls.    Number of Personal Factors/Comorbidities that affect the Plan of Care: 1-2: MODERATE COMPLEXITY   EXAMINATION:   Most Recent Physical Functioning:   Gross Assessment:                  Posture:     Balance:  Sitting: Intact  Standing: With support  Standing - Static: Fair  Standing - Dynamic : Fair Bed Mobility:  Supine to Sit: Stand-by assistance  Sit to Supine: Stand-by assistance  Scooting: Stand-by assistance  Interventions: Safety awareness training;Verbal cues;Demonstration  Wheelchair Mobility:     Transfers:  Sit to Stand: Minimum assistance;Contact guard assistance  Stand to Sit: Contact guard assistance  Bed to Chair: Minimum assistance  Interventions: Verbal cues; Safety awareness training  Gait:     Base of Support: Center of gravity altered  Speed/Clotilde: Delayed; Slow  Step Length: Right shortened;Left shortened  Gait Abnormalities: Hemiplegic  Distance (ft): 30 Feet (ft)(on carpet and 50' on hardfloor)  Assistive Device: Walker luke;Gait belt  Ambulation - Level of Assistance: Minimal assistance  Interventions: Safety awareness training;Verbal cues      Body Structures Involved:  1. Nerves  2. Voice/Speech  3. Bones  4. Joints  5. Muscles Body Functions Affected:  1. Mental  2. Sensory/Pain  3. Neuromusculoskeletal  4. Movement Related Activities and Participation Affected:  1. General Tasks and Demands  2. Mobility  3. Self Care  4. Interpersonal Interactions and Relationships   Number of elements that affect the Plan of Care: 4+: HIGH COMPLEXITY   CLINICAL PRESENTATION:   Presentation: Evolving clinical presentation with changing clinical characteristics: MODERATE COMPLEXITY   CLINICAL DECISION MAKIN [facility] Inpatient Short Form  How much difficulty does the patient currently have. .. Unable A Lot A Little None   1. Turning over in bed (including adjusting bedclothes, sheets and blankets)? [] 1   [] 2   [x] 3   [] 4   2. Sitting down on and standing up from a chair with arms ( e.g., wheelchair, bedside commode, etc.)   [] 1   [] 2   [x] 3   [] 4   3. Moving from lying on back to sitting on the side of the bed? [] 1   [] 2   [x] 3   [] 4   How much help from another person does the patient currently need. .. Total A Lot A Little None   4. Moving to and from a bed to a chair (including a wheelchair)? [] 1   [] 2   [x] 3   [] 4   5. Need to walk in hospital room? [] 1   [x] 2   [] 3   [] 4   6. Climbing 3-5 steps with a railing?    [] 1   [x] 2   [] 3   [] 4   © 2007, Trustees of [address] [place] [address] [address] [id number], under license to [employer]. All rights reserved      Score:  Initial: 13 Most Recent: 16 (Date:2/26/2020)    Interpretation of Tool:  Represents activities that are increasingly more difficult (i.e. Bed mobility, Transfers, Gait). Medical Necessity:     · Patient is expected to demonstrate progress in   · strength, range of motion, balance, coordination, and functional technique  ·  to   · increase independence with all mobility. · .  Reason for Services/Other Comments:  · Patient continues to require skilled intervention due to   · medical complications and mobility deficits which impact his level of function, safety, and independence as indicated above. · .   Use of outcome tool(s) and clinical judgement create a POC that gives a: Questionable prediction of patient's progress: MODERATE COMPLEXITY        TREATMENT:      Pre-treatment Symptoms/Complaints:  none  Pain: Initial: 0/10 Post Session:  0/10     Physical Therapy Re-Evaluation (untimed charge)    Gait Training (  23 Minutes):  Gait training to improve and/or restore physical functioning as related to mobility, strength and balance. Ambulated 30 Feet (ft)(on carpet and 50' on hardfloor) with Minimal assistance using a Walker luke;Gait belt and moderate Safety awareness training;Verbal cues related to their sequencing, knee positioning, foot placement, step length, weight shifts, luke walker placement, and posture to promote proper body alignment, promote proper body posture and promote proper body mechanics. Instruction in performance of the above deficits to correct overall gait sequencing and quality. Therapeutic Exercise: (  30):  Functional exercises including bed mobility training, stand pivot training, and sit to stand transfer training to improve mobility, strength and balance.   Required moderate visual and verbal cues to promote proper body alignment, promote proper body posture, promote proper body mechanics and and increased independence. Progressed range and complexity of movement as indicated. Today's treatment session addressed Decreased Strength, Decreased ADL/Functional Activities, Decreased Transfer Abilities, Decreased Ambulation Ability/Technique, Decreased Balance, Decreased Flexibility/Joint Mobility and slight confusion to progress towards achieving goal(s) 1, 2, 3 and 4. During this session, Occupational Therapy addressed Balance to progress towards their discipline specific goal(s). Co-treatment was necessary to improve patient's ability to follow higher level commands, ability to increase activity demands and ability to return to normal functional activity. Braces/Orthotics/Lines/Etc:   · Sling to L UE throughout transfers and ambulation  Treatment/Session Assessment:    · Response to Treatment:  See above  · Interdisciplinary Collaboration:   o Physical Therapy Assistant  o Certified Occupational Therapy Assistant  o Registered Nurse  · After treatment position/precautions:   o Up in chair  o Bed/Chair-wheels locked  o Bed in low position  o Call light within reach  o RN notified  o Patient taken to garden and [facility] throughout treatment; primary RN notified; unit secreatary signed patient out and returned on arrival to floor unit secretary and primary RN notified. · Compliance with Program/Exercises: Compliant all of the time  · Recommendations/Intent for next treatment session: \"Next visit will focus on advancements to more challenging activities and reduction in assistance provided\".     Total Treatment Duration:  PT Patient Time In/Time Out  Time In: 1108  Time Out: [facility]

## 2020-02-28 NOTE — PROGRESS NOTES
02/27/20 1907   NIH Stroke Scale   Interval Other (comment)   LOC 0   LOC Questions 0   LOC Commands 0   Best Gaze 0   Visual 0   Facial Palsy 1   Motor Right Arm 0   Motor Left Arm 2   Motor Right Leg 0   Motor Left Leg 2   Limb Ataxia 0   Sensory 0   Best Language 1   Dysarthria 1   Extinction and Inattention 0   Total 7

## 2020-02-28 NOTE — PROGRESS NOTES
Hospitalist Progress Note     Admit Date:  2019  9:07 AM   Name:  Alanna Boss   Age:  53 y.o.  :  1975   MRN:  [id number]   PCP:  Ashley Sosa MD  Treatment Team: Attending Provider: José Luis Sinclair MD; Care Manager: Kacy Ozuna RN; Care Manager: Fabiola Garcia LM; Utilization Review: Rylan Asencio RN; Nurse Practitioner: Carlyle Tse NP; Charge Nurse: Mk Savage; Physical Therapy Assistant: Negro Lewis; Occupational Therapy Assistant: Angelo Goncalves    Subjective:   52 yo male with PMH of DM II, HTN who presented 19 with c/o left leg and arm weakness, left facial droop and dysarthria.  Code S called. MRI with acute infarctions in left cerebellar hemisphere, deep frontoparietal white matter and right paramedian yariel.  Also noted to have old lacunar infarcts. CTA without large vessel occlusions, however some stenosis noted.  Echo showed PFO, Cardiology to f/u outpatient for evaluation for closure.  Neurology consulted.  Started on ASA, statin.  Pt is uninsured, difficult placement, case management working on plan.     Today: No acute events. No concerns per patient.  has informed me there are currently no options for the patient to be safely discharged given his lack of funding. Nursing notes and chart reviewed. Review of Systems negative with exception of pertinent positives noted above.       Objective:     Patient Vitals for the past 24 hrs:   Temp Pulse Resp BP SpO2   20 1109 97.9 °F (36.6 °C) 62 18 130/84 98 %   20 0715 98.1 °F (36.7 °C) 69 18 133/88 97 %   20 0400 97.8 °F (36.6 °C) 69 18 (!) 142/92 95 %   20 0000 97.8 °F (36.6 °C) 66 18 159/84 94 %   20 2000 97.8 °F (36.6 °C) 68 18 155/81 95 %   20 1600 98 °F (36.7 °C) 68 18 (!) 126/92 98 %   20 1200 97.4 °F (36.3 °C) 80 18 (!) 146/98 100 %     Oxygen Therapy  O2 Sat (%): 98 % (20 1109)  Pulse via Oximetry: 66 beats per minute (02/01/20 0000)  O2 Device: Room air (02/01/20 0000)    Intake/Output Summary (Last 24 hours) at 2/28/2020 1152  Last data filed at 2/28/2020 0814  Gross per 24 hour   Intake 400 ml   Output    Net 400 ml         General:    Well nourished. Alert. LUE unchanged from previous exam, watching the [employer] of [place] on TV  CV:   RRR. No murmur, rub, or gallop. Lungs:   CTAB. No wheezing, rhonchi, or rales. Abdomen:   Soft, nontender, nondistended. Extremities: Warm and dry. No cyanosis or edema. Skin:     No rashes or jaundice. Data Review:  I have reviewed all labs, meds, telemetry events, and studies from the last 24 hours. No results found for this or any previous visit (from the past 24 hour(s)).      All Micro Results     None          Current Meds:  Current Facility-Administered Medications   Medication Dose Route Frequency    metFORMIN (GLUCOPHAGE) tablet 1,000 mg  1,000 mg Oral DAILY WITH DINNER    metFORMIN (GLUCOPHAGE) tablet 500 mg  500 mg Oral DAILY WITH BREAKFAST    diphenhydrAMINE (BENADRYL) capsule 25 mg  25 mg Oral Q6H PRN    acetaminophen (TYLENOL) tablet 650 mg  650 mg Oral Q8H    lip protectant (BLISTEX) ointment 1 Each  1 Each Topical PRN    metoprolol succinate (TOPROL-XL) XL tablet 25 mg  25 mg Oral DAILY    polyethylene glycol (MIRALAX) packet 17 g  17 g Oral DAILY PRN    guaiFENesin (ROBITUSSIN) 100 mg/5 mL oral liquid 100 mg  100 mg Oral Q4H PRN    hydrALAZINE (APRESOLINE) 20 mg/mL injection 20 mg  20 mg IntraVENous Q4H PRN    atorvastatin (LIPITOR) tablet 80 mg  80 mg Oral QHS    lisinopril (PRINIVIL, ZESTRIL) tablet 40 mg  40 mg Oral DAILY    sodium chloride (NS) flush 5-40 mL  5-40 mL IntraVENous PRN    ondansetron (ZOFRAN) injection 4 mg  4 mg IntraVENous Q4H PRN    aspirin chewable tablet 81 mg  81 mg Oral DAILY    enoxaparin (LOVENOX) injection 40 mg  40 mg SubCUTAneous Q24H    dextrose 40% (GLUTOSE) oral gel 1 Tube  15 g Oral PRN    glucagon (GLUCAGEN) injection 1 mg  1 mg IntraMUSCular PRN    dextrose (D50W) injection syrg 12.5-25 g  25-50 mL IntraVENous PRN       Other Studies (last 24 hours):  No results found. Assessment and Plan:     Hospital Problems as of 2/28/2020 Date Reviewed: 3/3/2017          Codes Class Noted - Resolved POA    PFO (patent foramen ovale) ICD-10-CM: Q21.1  ICD-9-CM: 745.5  12/17/2019 - Present Yes        Arrhythmia ICD-10-CM: I49.9  ICD-9-CM: 427.9  12/15/2019 - Present Yes        Hyperlipemia ICD-10-CM: E78.5  ICD-9-CM: 272.4  12/15/2019 - Present Yes        * (Principal) Acute embolic stroke [facility]) [id number]: I63.9  ICD-9-CM: 434.11  12/12/2019 - Present Yes        Hypertension (Chronic) ICD-10-CM: I10  ICD-9-CM: 401.9  Unknown - Present Yes        Type 2 diabetes mellitus (HCC) (Chronic) ICD-10-CM: E11.9  ICD-9-CM: 250.00  Unknown - Present Yes              PLAN:    Acute embolic stroke  -is \"outpatient ready\" so no routine labs indicated at this point  -MRI with acute infarcts in L cerebellar hemisphere, deep frontoparietal white matter, and R paramedian yariel. old lacunar infarcts. -ISMAEL: 3 mm PFO, Cardiology stated he needs an evaluation for closure PFO with high risk features including significant (3 mm) separation of septum secundum and primum as well as large shunt.  Will await recovery of CVA.  Plan is 4 week event monitor as outpatient. Errol Ozuna then see in office and if no arrhythmias, will plan PFO closure  -continue statin, ASA     Hypertension:   -lisinopril, metoprolol     Type 2 diabetes mellitus:    -Patient currently on metformin 1500 mg daily, escalate to 2000 mg daily on 3/3/2020     Left upper extremity pain  -titrate down tylenol from q6h to q8h (2/23)     Allergies  Benadryl for allergies as needed    DISPO: waiting for pt to be deemed \"disabled\" which in neurologic cases have to allow for 6 months showing chronicity.  Then he can get medicaid after that. Medically stable for discharge.    DVT ppx:  lovenox   Signed:  Radha Be MD

## 2020-02-28 NOTE — PROGRESS NOTES
Problem: Self Care Deficits Care Plan (Adult)  Goal: *Acute Goals and Plan of Care (Insert Text)  Description  GOALS UPDATED 1/22/2020:   1. Patient will complete dynamic sitting balance for ADLs with supervision. PROGRESSING 2/9/2020 (Progressing, 2/28/2020)  2. Patient will demonstrate appropriate safety awareness and protection of L UE during bed mobility and functional transfers with minimal cues. (Progressing) (Progressing, 2/28/2020)  3. Patient will complete total body bathing and dressing with moderate assistance and adaptive equipment as needed. ( [place] 2/9/2020 (Progressing, 2/28/2020)  4. Patient will complete weightbearing into the L UE with ADL tasks with minimal assistance to improve ability to use as a functional assist during ADL tasks. (Progressing) (Progressing, 2/28/2020)5. Patient will demonstrate L UE SROM HEP within 7 days. 6. Patient will complete bed mobility with stand by assistance and adaptive equipment as needed in preparation for functional transfers. PROGRESSING 2/9/2020  7. Patient will complete functional transfers with minimal assistance with equipment as needed. (New goal)8. (Goal Met)    Outcome: Progressing Towards Goal     OCCUPATIONAL THERAPY: Daily Note and AM    2/28/2020  INPATIENT: OT Visit Days: 7  Payor: MEDICAID PENDING / Plan: [employer] PENDING / Product Type: Medicaid /      NAME/AGE/GENDER: Ebenezer Lima is a 53 y.o. male   PRIMARY DIAGNOSIS:  Acute ischemic stroke ([facility] [I63.9]  Cerebrovascular accident (CVA) due to embolism ([address] [I88.0] Acute embolic stroke ([facility] [address] Acute embolic stroke ([address]       ICD-10: Treatment Diagnosis:    · Generalized Muscle Weakness (M62.81)  · Other lack of cordination (R27.8)  · Hemiplegia and hemiparesis following cerebral infarction affecting   · left non-dominant side (I69.354)  · Abnormal posture (R29.3)   Precautions/Allergies:    NO PULLING ON LUE   LUE in sling with shoulder joint approximated and supported   Patient has no known allergies. ASSESSMENT:     Mr. Anoop Myers presents supine in bed upon arrival. Pt practiced bed mobility several times to increase supine to sit transfers. Pt sat edge of bed with good balance while participating in NDT and ROM activities. Pt worked on [employer] using luke technique and was able to complete self feeding with set up. Pt completed standing to increase balance and postural alignment during activities. Pt was left up in the chair finishing lunch with his wife in the room and belongings in reach. Good effort. Continue POC. This section established at most recent assessment   PROBLEM LIST (Impairments causing functional limitations):  1. Decreased Strength  2. Decreased ADL/Functional Activities  3. Decreased Transfer Abilities  4. Decreased Ambulation Ability/Technique  5. Decreased Balance  6. Decreased Activity Tolerance  7. Increased Fatigue  8. Decreased Flexibility/Joint Mobility  9. Decreased Knowledge of Precautions  10. Decreased [place] with Home Exercise Program   INTERVENTIONS PLANNED: (Benefits and precautions of occupational therapy have been discussed with the patient.)  1. Activities of daily living training  2. Adaptive equipment training  3. Balance training  4. Clothing management  5. Cognitive training  6. Donning&doffing training  7. Luke tech training  8. Neuromuscular re-eduation  9. Therapeutic activity  10. Therapeutic exercise     TREATMENT PLAN: Frequency/Duration: Follow patient 3 times per week to address above goals. Rehabilitation Potential For Stated Goals: Excellent     REHAB RECOMMENDATIONS (at time of discharge pending progress):    Placement: It is my opinion, based on this patient's performance to date, that Mr. Anoop Myers may benefit from intensive therapy at an [address] [place] [address] after discharge due to potential to make ongoing and sustainable functional improvement that is of practical value. Jeremias Coon  Pt functioning far below independent baseline, demonstrating good improvement and participation. Pt would likely benefit greatly and increase independence from inpatient rehab stay. Equipment:    TBD               OCCUPATIONAL PROFILE AND HISTORY:   History of Present Injury/Illness (Reason for Referral):  See H&P  Past Medical History/Comorbidities:   Mr. Anoop Myers  has a past medical history of Acute ischemic stroke ([address] (12/12/2019), Acute pancreatitis (11/19/2014), Cerebrovascular accident (CVA) due to embolism ([address] (12/12/2019), Diabetes ([address] (2002), Diabetes ([address] Diabetes mellitus, and Hypertension. He also has no past medical history of Arthritis, Asthma, Autoimmune disease ([address] CAD (coronary artery disease), Cancer ([address] Chronic kidney disease, COPD, Dementia, Dementia ([address] Heart failure ([address] Ill-defined condition, Infectious disease, Liver disease, Other ill-defined conditions(799.89), Psychiatric disorder, PUD (peptic ulcer disease), Seizures ([address] or Sleep disorder. Mr. Anoop Myers  has a past surgical history that includes hx hernia repair and hx orthopaedic. Social History/Living Environment:   Home Environment: Apartment  # Steps to Enter: 12  Rails to Enter: Yes  [employer] : Bilateral  One/Two Story Residence: One story  Living Alone: No  Support Systems: Spouse/Significant Other/Partner  Patient Expects to be Discharged to[de-identified] Unknown  Current DME Used/Available at Home: None  Tub or Shower Type: Tub/Shower combination  Prior Level of Function/Work/Activity:  Pt lives at home with his wife. Pt is typically independent with ADL/functional mobility. Pt does not drive. Pt was working part-time at [email or web address]. Personal Factors:          Age:  53 y.o.         Past/Current Experience:  CVA with flaccid L side        Other factors that influence how disability is experienced by the patient:  multiple co-morbidities    Number of Personal Factors/Comorbidities that affect the Plan of Care: Extensive review of physical, cognitive, and psychosocial performance (3+):  HIGH COMPLEXITY   ASSESSMENT OF OCCUPATIONAL PERFORMANCE[de-identified]   Activities of Daily Living:   Basic ADLs (From Assessment) Complex ADLs (From Assessment)   Feeding: Setup  Oral Facial Hygiene/Grooming: Moderate assistance  Bathing: Moderate assistance  Upper Body Dressing: Maximum assistance  Lower Body Dressing: Total assistance  Toileting: Total assistance Instrumental ADL  Meal Preparation: Total assistance  Homemaking: Total assistance   Grooming/Bathing/Dressing Activities of Daily Living         Upper Body Bathing  Bathing Assistance: Min A   Position Performed: Seated in chair  Feeding  Feeding Assistance: [address]: Moderate assistance(luke technique)     Lower Body Dressing Assistance  Socks: Total assistance (dependent) Bed/Mat Mobility  Supine to Sit: Stand-by assistance  Sit to Supine: Stand-by assistance  Sit to Stand: Minimum assistance;Contact guard assistance  Stand to Sit: Contact guard assistance  Bed to Chair: Minimum assistance  Scooting: Stand-by assistance     Most Recent Physical Functioning:   Gross Assessment:                  Posture:     Balance:  Sitting: Intact  Standing: With support  Standing - Static: Fair  Standing - Dynamic : Fair Bed Mobility:  Supine to Sit: Stand-by assistance  Sit to Supine: Stand-by assistance  Scooting: Stand-by assistance  Interventions: Safety awareness training;Verbal cues;Demonstration  Wheelchair Mobility:     Transfers:  Sit to Stand: Minimum assistance;Contact guard assistance  Stand to Sit: Contact guard assistance  Bed to Chair: Minimum assistance  Interventions: Verbal cues; Safety awareness training            Patient Vitals for the past 6 hrs:   BP BP Patient Position SpO2 Pulse   02/28/20 1109 130/84 At rest 98 % 62       Mental Status  Neurologic State: Alert  Orientation Level: Oriented X4  Cognition: Follows commands  Perception: Verbal, Tactile  Perseveration: Tactile cues provided, Verbal cues provided  Safety/Judgement: Fall prevention                          Physical Skills Involved:  1. Range of Motion  2. Balance  3. Strength  4. Activity Tolerance  5. Fine Motor Control  6. Gross Motor Control Cognitive Skills Affected (resulting in the inability to perform in a timely and safe manner):  1. Perception  2. Expression Psychosocial Skills Affected:  1. Habits/Routines  2. Environmental Adaptation  3. Social Interaction  4. Emotional Regulation  5. Self-Awareness  6. Awareness of Others  7. Social Roles   Number of elements that affect the Plan of Care: 5+:  HIGH COMPLEXITY   CLINICAL DECISION MAKIN[address] [place] [address] AM-PAC 6 Clicks   Daily Activity Inpatient Short Form  How much help from another person does the patient currently need. .. Total A Lot A Little None   1. Putting on and taking off regular lower body clothing? [x] 1   [] 2   [] 3   [] 4   2. Bathing (including washing, rinsing, drying)? [] 1   [x] 2   [] 3   [] 4   3. Toileting, which includes using toilet, bedpan or urinal?   [] 1   [x] 2   [] 3   [] 4   4. Putting on and taking off regular upper body clothing? [] 1   [x] 2   [] 3   [] 4   5. Taking care of personal grooming such as brushing teeth? [] 1   [x] 2   [] 3   [] 4   6. Eating meals? [] 1   [] 2   [x] 3   [] 4   © , Trustees of [address] [place] [address], under license to [employer]. All rights reserved      Score:  Initial: 11 Most Recent: 12 (Date: 2020 )    Interpretation of Tool:  Represents activities that are increasingly more difficult (i.e. Bed mobility, Transfers, Gait). Medical Necessity:     · Patient demonstrates   · good and excellent  ·  rehab potential due to higher previous functional level.   Reason for Services/Other Comments:  · Patient continues to require skilled intervention due to   · Decreased independence with ADL/functional transfers that impacts overall quality of life.   · .   Use of outcome tool(s) and clinical judgement create a POC that gives a: MODERATE COMPLEXITY         TREATMENT:   (In addition to Assessment/Re-Assessment sessions the following treatments were rendered)     Pre-treatment Symptoms/Complaints:    Pain: Initial:   Pain Intensity 1: 0  Pain Location 1: Shoulder  Pain Orientation 1: Left  Pain Intervention(s) 1: Exercise, Repositioned  Post Session:  0     Self Care: (10 minutes): Procedure(s) (per grid) utilized to improve and/or restore self-care/home management as related to dressing and self feeding. Required moderate visual, verbal, manual and   cueing to facilitate activities of daily living skills. Therapeutic Activity: (30 minutes): Therapeutic activities including Bed transfers, Chair transfers and static/dynamic standing to improve mobility, strength and balance. Required moderate Safety awareness training;Verbal cues to promote dynamic balance in standing. Neuromuscular Re-education: (15 minutes):  Exercise/activities per grid below to improve balance, coordination, kinesthetic sense, posture and proprioception. Required moderate visual, verbal and manual cues to promote static and dynamic balance in standing.      LUE Re-Education  Other Activities: PROM all planes; lateral WBing Trunk Re-Education  Other Activities: weight shifting in standing             Date:  1/27/2020 Date:  2/11/2020 Date:   2/21/2020 2/28/2020      Activity/Exercise Parameters Parameters Parameters    LUE finger flexion, AROM gravity minimized then AAROM to complete ROM 2x10 reps 2x10 reps 3 x's 10 reps 3 x's 10 reps   LUE finger extension AAROM 2x10 reps 2x10 reps 3 x's 10 reps 3 x's 10 reps   LUE wrist extension, AROM gravity minimized then AAROM to complete ROM 1x10 reps 1x10 reps 3 x's 10 reps 3 x's 10 reps   LUE wrist extension, AROM against gravity  1x10 reps 1x10 reps 3 x's 10 reps 3 x's 10 reps   LUE wrist flexion AAROM 2x10 reps 1x10 reps 3 x's 10 reps 3 x's 10 reps   LUE elbow flexion/extension PROM x10 reps 1x10 reps 3 x's 10 reps 3 x's 10 reps   LUE shoulder flexion PROM x10 reps 1x10 reps 3 x's 10 reps 3 x's 10 reps        Braces/Orthotics/Lines/Etc:   · O2 device: Room air  · Treatment/Session Assessment:    · Response to Treatment:  Pt demonstrated good participation. · Interdisciplinary Collaboration:   o Certified Occupational Therapy Assistant  o Registered Nurse  · After treatment position/precautions:   o Up in chair  o Bed/Chair-wheels locked  o Bed in low position  o Call light within reach  o RN notified  o Family at bedside   · Compliance with Program/Exercises: Compliant all of the time, Will assess as treatment progresses. · Recommendations/Intent for next treatment session: \"Next visit will focus on advancements to more challenging activities and reduction in assistance provided\".   Total Treatment Duration:  OT Patient Time In/Time Out  Time In: 1108  Time Out: Dontrell Lopez [facility]

## 2020-02-29 PROCEDURE — 74011250637 HC RX REV CODE- 250/637: Performed by: INTERNAL MEDICINE

## 2020-02-29 PROCEDURE — 74011250637 HC RX REV CODE- 250/637: Performed by: FAMILY MEDICINE

## 2020-02-29 PROCEDURE — 74011250637 HC RX REV CODE- 250/637: Performed by: HOSPITALIST

## 2020-02-29 PROCEDURE — 65270000029 HC RM PRIVATE

## 2020-02-29 PROCEDURE — 74011250636 HC RX REV CODE- 250/636: Performed by: INTERNAL MEDICINE

## 2020-02-29 RX ADMIN — Medication 5 ML: at 22:03

## 2020-02-29 RX ADMIN — ACETAMINOPHEN 650 MG: 325 TABLET, FILM COATED ORAL at 13:46

## 2020-02-29 RX ADMIN — ENOXAPARIN SODIUM 40 MG: 40 INJECTION SUBCUTANEOUS at 13:46

## 2020-02-29 RX ADMIN — METOPROLOL SUCCINATE 25 MG: 25 TABLET, FILM COATED, EXTENDED RELEASE ORAL at 09:09

## 2020-02-29 RX ADMIN — ACETAMINOPHEN 650 MG: 325 TABLET, FILM COATED ORAL at 22:03

## 2020-02-29 RX ADMIN — METFORMIN HYDROCHLORIDE 1000 MG: 500 TABLET ORAL at 16:59

## 2020-02-29 RX ADMIN — ATORVASTATIN CALCIUM 80 MG: 80 TABLET, FILM COATED ORAL at 22:03

## 2020-02-29 RX ADMIN — ASPIRIN 81 MG 81 MG: 81 TABLET ORAL at 09:10

## 2020-02-29 RX ADMIN — DIPHENHYDRAMINE HYDROCHLORIDE 25 MG: 25 CAPSULE ORAL at 17:10

## 2020-02-29 RX ADMIN — METFORMIN HYDROCHLORIDE 500 MG: 500 TABLET ORAL at 09:09

## 2020-02-29 RX ADMIN — LISINOPRIL 40 MG: 20 TABLET ORAL at 09:10

## 2020-02-29 RX ADMIN — ACETAMINOPHEN 650 MG: 325 TABLET, FILM COATED ORAL at 05:22

## 2020-02-29 RX ADMIN — GUAIFENESIN 100 MG: 100 SOLUTION ORAL at 17:10

## 2020-02-29 NOTE — PROGRESS NOTES
Hospitalist Progress Note     Admit Date:  2019  9:07 AM   Name:  Edilberto Dos Santos   Age:  53 y.o.  :  1975   MRN:  [id number]   PCP:  Henrry Shelton MD  Treatment Team: Attending Provider: Zahraa Arreola MD; Care Manager: Chidi Rogel RN; Care Manager: Deanna Zarate LMSW; Utilization Review: Jose Rafael Montero RN; Nurse Practitioner: Talib Mays NP    Subjective:   52 yo male with PMH of DM II, HTN who presented 19 with c/o left leg and arm weakness, left facial droop and dysarthria.  Code S called. MRI with acute infarctions in left cerebellar hemisphere, deep frontoparietal white matter and right paramedian yariel.  Also noted to have old lacunar infarcts. CTA without large vessel occlusions, however some stenosis noted.  Echo showed PFO, Cardiology to f/u outpatient for evaluation for closure.  Neurology consulted.  Started on ASA, statin.  Pt is uninsured, difficult placement, case management working on plan.     Today: No acute events. No concerns per patient.  has informed me there are currently no options for the patient to be safely discharged given his lack of funding. Nursing notes and chart reviewed. Review of Systems negative with exception of pertinent positives noted above. Objective:     Patient Vitals for the past 24 hrs:   Temp Pulse Resp BP SpO2   20 1200 98.6 °F (37 °C) 79 19 137/80 95 %   20 0800 97.9 °F (36.6 °C) 73 19 139/88 95 %   20 0400 97.7 °F (36.5 °C) 64 18 (!) 135/93 93 %   20 0000 98 °F (36.7 °C) 73 18 169/89 94 %   20 2000 97.8 °F (36.6 °C) 73 18 148/76 95 %   20 1432 97.8 °F (36.6 °C) 63 18 137/84 99 %     Oxygen Therapy  O2 Sat (%): 95 % (20 1200)  Pulse via Oximetry: 66 beats per minute (20 0000)  O2 Device: Room air (20 0000)  No intake or output data in the 24 hours ending 20 1303      General:    Sleeping with sheets pulled overhead  CV:   RRR.   No murmur, rub, or gallop. Lungs:   CTAB. No wheezing, rhonchi, or rales. Abdomen:   Soft, nontender, nondistended. Extremities: Warm and dry. No cyanosis or edema. Skin:     No rashes or jaundice. Data Review:  I have reviewed all labs, meds, telemetry events, and studies from the last 24 hours. No results found for this or any previous visit (from the past 24 hour(s)). All Micro Results     None          Current Meds:  Current Facility-Administered Medications   Medication Dose Route Frequency    metFORMIN (GLUCOPHAGE) tablet 1,000 mg  1,000 mg Oral DAILY WITH DINNER    metFORMIN (GLUCOPHAGE) tablet 500 mg  500 mg Oral DAILY WITH BREAKFAST    diphenhydrAMINE (BENADRYL) capsule 25 mg  25 mg Oral Q6H PRN    acetaminophen (TYLENOL) tablet 650 mg  650 mg Oral Q8H    lip protectant (BLISTEX) ointment 1 Each  1 Each Topical PRN    metoprolol succinate (TOPROL-XL) XL tablet 25 mg  25 mg Oral DAILY    polyethylene glycol (MIRALAX) packet 17 g  17 g Oral DAILY PRN    guaiFENesin (ROBITUSSIN) 100 mg/5 mL oral liquid 100 mg  100 mg Oral Q4H PRN    hydrALAZINE (APRESOLINE) 20 mg/mL injection 20 mg  20 mg IntraVENous Q4H PRN    atorvastatin (LIPITOR) tablet 80 mg  80 mg Oral QHS    lisinopril (PRINIVIL, ZESTRIL) tablet 40 mg  40 mg Oral DAILY    sodium chloride (NS) flush 5-40 mL  5-40 mL IntraVENous PRN    ondansetron (ZOFRAN) injection 4 mg  4 mg IntraVENous Q4H PRN    aspirin chewable tablet 81 mg  81 mg Oral DAILY    enoxaparin (LOVENOX) injection 40 mg  40 mg SubCUTAneous Q24H    dextrose 40% (GLUTOSE) oral gel 1 Tube  15 g Oral PRN    glucagon (GLUCAGEN) injection 1 mg  1 mg IntraMUSCular PRN    dextrose (D50W) injection syrg 12.5-25 g  25-50 mL IntraVENous PRN       Other Studies (last 24 hours):  No results found.     Assessment and Plan:     Hospital Problems as of 2/29/2020 Date Reviewed: 3/3/2017          Codes Class Noted - Resolved POA    PFO (patent foramen ovale) ICD-10-CM: Q21.1  ICD-9-CM: 745.5  12/17/2019 - Present Yes        Arrhythmia ICD-10-CM: I49.9  ICD-9-CM: 427.9  12/15/2019 - Present Yes        Hyperlipemia ICD-10-CM: E78.5  ICD-9-CM: 272.4  12/15/2019 - Present Yes        * (Principal) Acute embolic stroke [facility]) UYA-31-FE: I63.9  ICD-9-CM: 434.11  12/12/2019 - Present Yes        Hypertension (Chronic) ICD-10-CM: I10  ICD-9-CM: 401.9  Unknown - Present Yes        Type 2 diabetes mellitus (HCC) (Chronic) ICD-10-CM: E11.9  ICD-9-CM: 250.00  Unknown - Present Yes              PLAN:    Acute embolic stroke  -is \"outpatient ready\" so no routine labs indicated at this point  -MRI with acute infarcts in L cerebellar hemisphere, deep frontoparietal white matter, and R paramedian yariel. old lacunar infarcts. -ISMAEL: 3 mm PFO, Cardiology stated he needs an evaluation for closure PFO with high risk features including significant (3 mm) separation of septum secundum and primum as well as large shunt.  Will await recovery of CVA.  Plan is 4 week event monitor as outpatient. Haseeb Zapata then see in office and if no arrhythmias, will plan PFO closure  -continue statin, ASA     Hypertension:   -lisinopril, metoprolol     Type 2 diabetes mellitus:    -Patient currently on metformin 1500 mg daily, escalate to 2000 mg daily on 3/3/2020     Left upper extremity pain  -titrate down tylenol from q6h to q8h (2/23)     Allergies  Benadryl for allergies as needed    DISPO: waiting for pt to be deemed \"disabled\" which in neurologic cases have to allow for 6 months showing chronicity.  Then he can get medicaid after that. Medically stable for discharge.    DVT ppx:  lovenox   Signed:  Krista Larkin MD

## 2020-02-29 NOTE — PROGRESS NOTES
Problem: Patient Education: Go to Patient Education Activity  Goal: Patient/Family Education  Outcome: Progressing Towards Goal     Problem: Pressure Injury - Risk of  Goal: *Prevention of pressure injury  Description  Document Dewey Scale and appropriate interventions in the flowsheet. Outcome: Progressing Towards Goal  Note: Pressure Injury Interventions:  Sensory Interventions: Assess changes in LOC, Chair cushion, Avoid rigorous massage over bony prominences    Moisture Interventions: Absorbent underpads, Check for incontinence Q2 hours and as needed    Activity Interventions: Increase time out of bed, Pressure redistribution bed/mattress(bed type), PT/OT evaluation    Mobility Interventions: HOB 30 degrees or less, Pressure redistribution bed/mattress (bed type), PT/OT evaluation    Nutrition Interventions: Document food/fluid/supplement intake, Offer support with meals,snacks and hydration    Friction and Shear Interventions: HOB 30 degrees or less                Problem: Patient Education: Go to Patient Education Activity  Goal: Patient/Family Education  Outcome: Progressing Towards Goal     Problem: Falls - Risk of  Goal: *Absence of Falls  Description  Document Jeanne Fall Risk and appropriate interventions in the flowsheet.   Outcome: Progressing Towards Goal  Note: Fall Risk Interventions:  Mobility Interventions: Bed/chair exit alarm, Communicate number of staff needed for ambulation/transfer, Patient to call before getting OOB    Mentation Interventions: Bed/chair exit alarm, Door open when patient unattended, Increase mobility, More frequent rounding, Reorient patient    Medication Interventions: Bed/chair exit alarm, Patient to call before getting OOB, Teach patient to arise slowly    Elimination Interventions: Bed/chair exit alarm, Call light in reach, Patient to call for help with toileting needs, Stay With Me (per policy), Toilet paper/wipes in reach, Toileting schedule/hourly rounds    History of Falls Interventions: Bed/chair exit alarm, Consult care management for discharge planning, Door open when patient unattended, Investigate reason for fall         Problem: Patient Education: Go to Patient Education Activity  Goal: Patient/Family Education  Outcome: Progressing Towards Goal     Problem: Diabetes Self-Management  Goal: *Disease process and treatment process  Description  Define diabetes and identify own type of diabetes; list 3 options for treating diabetes. Outcome: Progressing Towards Goal  Goal: *Incorporating nutritional management into lifestyle  Description  Describe effect of type, amount and timing of food on blood glucose; list 3 methods for planning meals. Outcome: Progressing Towards Goal  Goal: *Incorporating physical activity into lifestyle  Description  State effect of exercise on blood glucose levels. Outcome: Progressing Towards Goal  Goal: *Developing strategies to promote health/change behavior  Description  Define the ABC's of diabetes; identify appropriate screenings, schedule and personal plan for screenings. Outcome: Progressing Towards Goal  Goal: *Using medications safely  Description  State effect of diabetes medications on diabetes; name diabetes medication taking, action and side effects. Outcome: Progressing Towards Goal  Goal: *Monitoring blood glucose, interpreting and using results  Description  Identify recommended blood glucose targets  and personal targets. Outcome: Progressing Towards Goal  Goal: *Prevention, detection, treatment of acute complications  Description  List symptoms of hyper- and hypoglycemia; describe how to treat low blood sugar and actions for lowering  high blood glucose level. Outcome: Progressing Towards Goal  Goal: *Prevention, detection and treatment of chronic complications  Description  Define the natural course of diabetes and describe the relationship of blood glucose levels to long term complications of diabetes.   Outcome: Progressing Towards Goal  Goal: *Developing strategies to address psychosocial issues  Description  Describe feelings about living with diabetes; identify support needed and support network  Outcome: Progressing Towards Goal     Problem: Patient Education: Go to Patient Education Activity  Goal: Patient/Family Education  Outcome: Progressing Towards Goal     Problem: Patient Education: Go to Patient Education Activity  Goal: Patient/Family Education  Outcome: Progressing Towards Goal     Problem: Nutrition Deficit  Goal: *Optimize nutritional status  Outcome: Progressing Towards Goal     Problem: Patient Education: Go to Patient Education Activity  Goal: Patient/Family Education  Outcome: Progressing Towards Goal     Problem: General Medical Care Plan  Goal: *Vital signs within specified parameters  Outcome: Progressing Towards Goal  Goal: *Labs within defined limits  Outcome: Progressing Towards Goal  Goal: *Absence of infection signs and symptoms  Description  Wash hand more often   Outcome: Progressing Towards Goal  Goal: *Optimal pain control at patient's stated goal  Outcome: Progressing Towards Goal  Goal: *Skin integrity maintained  Outcome: Progressing Towards Goal  Goal: *Fluid volume balance  Outcome: Progressing Towards Goal  Goal: *Optimize nutritional status  Outcome: Progressing Towards Goal  Goal: *Anxiety reduced or absent  Outcome: Progressing Towards Goal  Goal: *Progressive mobility and function (eg: ADL's)  Outcome: Progressing Towards Goal     Problem: Patient Education: Go to Patient Education Activity  Goal: Patient/Family Education  Outcome: Progressing Towards Goal     Problem: Patient Education: Go to Patient Education Activity  Goal: Patient/Family Education  Outcome: Progressing Towards Goal     Problem: Patient Education: Go to Patient Education Activity  Goal: Patient/Family Education  Outcome: Progressing Towards Goal     Problem: Patient Education: Go to Patient Education Activity  Goal: Patient/Family Education  Outcome: Progressing Towards Goal     Problem: Ischemic Stroke: Discharge Outcomes  Goal: *Verbalizes anxiety and depression are reduced or absent  Outcome: Progressing Towards Goal  Goal: *Verbalize understanding of risk factor modification(Stroke Metric)  Outcome: Progressing Towards Goal  Goal: *Hemodynamically stable  Outcome: Progressing Towards Goal  Goal: *Absence of aspiration pneumonia  Outcome: Progressing Towards Goal  Goal: *Aware of needed dietary changes  Outcome: Progressing Towards Goal  Goal: *Verbalize understanding of prescribed medications including anti-coagulants, anti-lipid, and/or anti-platelets(Stroke Metric)  Outcome: Progressing Towards Goal  Goal: *Tolerating diet  Outcome: Progressing Towards Goal  Goal: *Aware of follow-up diagnostics related to anticoagulants  Outcome: Progressing Towards Goal  Goal: *Ability to perform ADLs and demonstrates progressive mobility and function  Outcome: Progressing Towards Goal  Goal: *Absence of DVT(Stroke Metric)  Outcome: Progressing Towards Goal  Goal: *Absence of aspiration  Outcome: Progressing Towards Goal  Goal: *Optimal pain control at patient's stated goal  Outcome: Progressing Towards Goal  Goal: *Home safety concerns addressed  Outcome: Progressing Towards Goal  Goal: *Describes available resources and support systems  Outcome: Progressing Towards Goal  Goal: *Verbalizes understanding of activation of EMS(911) for stroke symptoms(Stroke Metric)  Outcome: Progressing Towards Goal  Goal: *Understands and describes signs and symptoms to report to providers(Stroke Metric)  Outcome: Progressing Towards Goal  Goal: *Neurolgocially stable (absence of additional neurological deficits)  Outcome: Progressing Towards Goal  Goal: *Verbalizes importance of follow-up with primary care physician(Stroke Metric)  Outcome: Progressing Towards Goal  Goal: *Smoking cessation discussed,if applicable(Stroke Metric)  Outcome: Progressing Towards Goal  Goal: *Depression screening completed(Stroke Metric)  Outcome: Progressing Towards Goal     Problem: Pain  Goal: *Control of Pain  Outcome: Progressing Towards Goal     Problem: Patient Education: Go to Patient Education Activity  Goal: Patient/Family Education  Outcome: Progressing Towards Goal     Problem: Patient Education: Go to Patient Education Activity  Goal: Patient/Family Education  Outcome: Progressing Towards Goal

## 2020-02-29 NOTE — PROGRESS NOTES
Problem: Patient Education: Go to Patient Education Activity  Goal: Patient/Family Education  Outcome: Progressing Towards Goal     Problem: Pressure Injury - Risk of  Goal: *Prevention of pressure injury  Description  Document Dewey Scale and appropriate interventions in the flowsheet. Outcome: Progressing Towards Goal  Note: Pressure Injury Interventions:  Sensory Interventions: Assess changes in LOC, Check visual cues for pain, Keep linens dry and wrinkle-free, Minimize linen layers    Moisture Interventions: Absorbent underpads, Check for incontinence Q2 hours and as needed, Limit adult briefs, Minimize layers    Activity Interventions: Increase time out of bed, Pressure redistribution bed/mattress(bed type), PT/OT evaluation    Mobility Interventions: HOB 30 degrees or less, Pressure redistribution bed/mattress (bed type), PT/OT evaluation    Nutrition Interventions: Document food/fluid/supplement intake    Friction and Shear Interventions: Apply protective barrier, creams and emollients, HOB 30 degrees or less, Lift sheet                Problem: Patient Education: Go to Patient Education Activity  Goal: Patient/Family Education  Outcome: Progressing Towards Goal     Problem: Falls - Risk of  Goal: *Absence of Falls  Description  Document Jeanne Fall Risk and appropriate interventions in the flowsheet.   Outcome: Progressing Towards Goal  Note: Fall Risk Interventions:  Mobility Interventions: Bed/chair exit alarm, Communicate number of staff needed for ambulation/transfer, OT consult for ADLs, Patient to call before getting OOB, PT Consult for mobility concerns    Mentation Interventions: Bed/chair exit alarm    Medication Interventions: Bed/chair exit alarm, Patient to call before getting OOB, Teach patient to arise slowly    Elimination Interventions: Bed/chair exit alarm, Call light in reach, Patient to call for help with toileting needs, Stay With Me (per policy), Toilet paper/wipes in reach, Toileting schedule/hourly rounds    History of Falls Interventions: Bed/chair exit alarm, Consult care management for discharge planning         Problem: Patient Education: Go to Patient Education Activity  Goal: Patient/Family Education  Outcome: Progressing Towards Goal     Problem: Diabetes Self-Management  Goal: *Disease process and treatment process  Description  Define diabetes and identify own type of diabetes; list 3 options for treating diabetes. Outcome: Progressing Towards Goal  Goal: *Incorporating nutritional management into lifestyle  Description  Describe effect of type, amount and timing of food on blood glucose; list 3 methods for planning meals. Outcome: Progressing Towards Goal  Goal: *Incorporating physical activity into lifestyle  Description  State effect of exercise on blood glucose levels. Outcome: Progressing Towards Goal  Goal: *Developing strategies to promote health/change behavior  Description  Define the ABC's of diabetes; identify appropriate screenings, schedule and personal plan for screenings. Outcome: Progressing Towards Goal  Goal: *Using medications safely  Description  State effect of diabetes medications on diabetes; name diabetes medication taking, action and side effects. Outcome: Progressing Towards Goal  Goal: *Monitoring blood glucose, interpreting and using results  Description  Identify recommended blood glucose targets  and personal targets. Outcome: Progressing Towards Goal  Goal: *Prevention, detection, treatment of acute complications  Description  List symptoms of hyper- and hypoglycemia; describe how to treat low blood sugar and actions for lowering  high blood glucose level. Outcome: Progressing Towards Goal  Goal: *Prevention, detection and treatment of chronic complications  Description  Define the natural course of diabetes and describe the relationship of blood glucose levels to long term complications of diabetes.   Outcome: Progressing Towards Goal  Goal: *Developing strategies to address psychosocial issues  Description  Describe feelings about living with diabetes; identify support needed and support network  Outcome: Progressing Towards Goal     Problem: Patient Education: Go to Patient Education Activity  Goal: Patient/Family Education  Outcome: Progressing Towards Goal     Problem: Patient Education: Go to Patient Education Activity  Goal: Patient/Family Education  Outcome: Progressing Towards Goal     Problem: Nutrition Deficit  Goal: *Optimize nutritional status  Outcome: Progressing Towards Goal     Problem: Patient Education: Go to Patient Education Activity  Goal: Patient/Family Education  Outcome: Progressing Towards Goal     Problem: General Medical Care Plan  Goal: *Vital signs within specified parameters  Outcome: Progressing Towards Goal  Goal: *Labs within defined limits  Outcome: Progressing Towards Goal  Goal: *Absence of infection signs and symptoms  Description  Wash hand more often   Outcome: Progressing Towards Goal  Goal: *Optimal pain control at patient's stated goal  Outcome: Progressing Towards Goal  Goal: *Skin integrity maintained  Outcome: Progressing Towards Goal  Goal: *Fluid volume balance  Outcome: Progressing Towards Goal  Goal: *Optimize nutritional status  Outcome: Progressing Towards Goal  Goal: *Anxiety reduced or absent  Outcome: Progressing Towards Goal  Goal: *Progressive mobility and function (eg: ADL's)  Outcome: Progressing Towards Goal     Problem: Patient Education: Go to Patient Education Activity  Goal: Patient/Family Education  Outcome: Progressing Towards Goal     Problem: Patient Education: Go to Patient Education Activity  Goal: Patient/Family Education  Outcome: Progressing Towards Goal     Problem: Patient Education: Go to Patient Education Activity  Goal: Patient/Family Education  Outcome: Progressing Towards Goal     Problem: Patient Education: Go to Patient Education Activity  Goal: Patient/Family Education  Outcome: Progressing Towards Goal     Problem: Ischemic Stroke: Discharge Outcomes  Goal: *Verbalizes anxiety and depression are reduced or absent  Outcome: Progressing Towards Goal  Goal: *Verbalize understanding of risk factor modification(Stroke Metric)  Outcome: Progressing Towards Goal  Goal: *Hemodynamically stable  Outcome: Progressing Towards Goal  Goal: *Absence of aspiration pneumonia  Outcome: Progressing Towards Goal  Goal: *Aware of needed dietary changes  Outcome: Progressing Towards Goal  Goal: *Verbalize understanding of prescribed medications including anti-coagulants, anti-lipid, and/or anti-platelets(Stroke Metric)  Outcome: Progressing Towards Goal  Goal: *Tolerating diet  Outcome: Progressing Towards Goal  Goal: *Aware of follow-up diagnostics related to anticoagulants  Outcome: Progressing Towards Goal  Goal: *Ability to perform ADLs and demonstrates progressive mobility and function  Outcome: Progressing Towards Goal  Goal: *Absence of DVT(Stroke Metric)  Outcome: Progressing Towards Goal  Goal: *Absence of aspiration  Outcome: Progressing Towards Goal  Goal: *Optimal pain control at patient's stated goal  Outcome: Progressing Towards Goal  Goal: *Home safety concerns addressed  Outcome: Progressing Towards Goal  Goal: *Describes available resources and support systems  Outcome: Progressing Towards Goal  Goal: *Verbalizes understanding of activation of EMS(911) for stroke symptoms(Stroke Metric)  Outcome: Progressing Towards Goal  Goal: *Understands and describes signs and symptoms to report to providers(Stroke Metric)  Outcome: Progressing Towards Goal  Goal: *Neurolgocially stable (absence of additional neurological deficits)  Outcome: Progressing Towards Goal  Goal: *Verbalizes importance of follow-up with primary care physician(Stroke Metric)  Outcome: Progressing Towards Goal  Goal: *Smoking cessation discussed,if applicable(Stroke Metric)  Outcome: Progressing Towards Goal  Goal: *Depression screening completed(Stroke Metric)  Outcome: Progressing Towards Goal     Problem: Pain  Goal: *Control of Pain  Outcome: Progressing Towards Goal     Problem: Patient Education: Go to Patient Education Activity  Goal: Patient/Family Education  Outcome: Progressing Towards Goal     Problem: Patient Education: Go to Patient Education Activity  Goal: Patient/Family Education  Outcome: Progressing Towards Goal

## 2020-02-29 NOTE — PROGRESS NOTES
Patient is awake  Alert    Encouraged       Niurka Thomas, staff Dontrell lozano 77, [address] [facility]  /   [email or web address]

## 2020-02-29 NOTE — PROGRESS NOTES
02/28/20 1932   NIH Stroke Scale   Interval Other (comment)   LOC 0   LOC Questions 0   LOC Commands 0   Best Gaze 0   Visual 0   Facial Palsy 1   Motor Right Arm 0   Motor Left Arm 2   Motor Right Leg 0   Motor Left Leg 2   Limb Ataxia 0   Sensory 0   Best Language 1   Dysarthria 1   Extinction and Inattention 0   Total 7

## 2020-03-01 LAB — GLUCOSE BLD STRIP.AUTO-MCNC: 84 MG/DL (ref 65–100)

## 2020-03-01 PROCEDURE — 74011250637 HC RX REV CODE- 250/637: Performed by: HOSPITALIST

## 2020-03-01 PROCEDURE — 74011250637 HC RX REV CODE- 250/637: Performed by: FAMILY MEDICINE

## 2020-03-01 PROCEDURE — 74011250637 HC RX REV CODE- 250/637: Performed by: INTERNAL MEDICINE

## 2020-03-01 PROCEDURE — 74011250636 HC RX REV CODE- 250/636: Performed by: INTERNAL MEDICINE

## 2020-03-01 PROCEDURE — 65270000029 HC RM PRIVATE

## 2020-03-01 PROCEDURE — 82962 GLUCOSE BLOOD TEST: CPT

## 2020-03-01 RX ADMIN — ATORVASTATIN CALCIUM 80 MG: 80 TABLET, FILM COATED ORAL at 21:32

## 2020-03-01 RX ADMIN — ACETAMINOPHEN 650 MG: 325 TABLET, FILM COATED ORAL at 14:29

## 2020-03-01 RX ADMIN — ASPIRIN 81 MG 81 MG: 81 TABLET ORAL at 09:12

## 2020-03-01 RX ADMIN — GUAIFENESIN 100 MG: 100 SOLUTION ORAL at 17:58

## 2020-03-01 RX ADMIN — METFORMIN HYDROCHLORIDE 500 MG: 500 TABLET ORAL at 09:12

## 2020-03-01 RX ADMIN — ACETAMINOPHEN 650 MG: 325 TABLET, FILM COATED ORAL at 21:32

## 2020-03-01 RX ADMIN — ENOXAPARIN SODIUM 40 MG: 40 INJECTION SUBCUTANEOUS at 14:29

## 2020-03-01 RX ADMIN — LISINOPRIL 40 MG: 20 TABLET ORAL at 09:12

## 2020-03-01 RX ADMIN — DIPHENHYDRAMINE HYDROCHLORIDE 25 MG: 25 CAPSULE ORAL at 17:57

## 2020-03-01 RX ADMIN — METFORMIN HYDROCHLORIDE 1000 MG: 500 TABLET ORAL at 17:52

## 2020-03-01 RX ADMIN — METOPROLOL SUCCINATE 25 MG: 25 TABLET, FILM COATED, EXTENDED RELEASE ORAL at 09:12

## 2020-03-01 NOTE — PROGRESS NOTES
Problem: Patient Education: Go to Patient Education Activity  Goal: Patient/Family Education  Outcome: Progressing Towards Goal     Problem: Pressure Injury - Risk of  Goal: *Prevention of pressure injury  Description  Document Dewey Scale and appropriate interventions in the flowsheet. Outcome: Progressing Towards Goal  Note: Pressure Injury Interventions:  Sensory Interventions: Assess changes in LOC, Avoid rigorous massage over bony prominences    Moisture Interventions: Absorbent underpads, Check for incontinence Q2 hours and as needed    Activity Interventions: Increase time out of bed, Pressure redistribution bed/mattress(bed type), PT/OT evaluation    Mobility Interventions: HOB 30 degrees or less, Pressure redistribution bed/mattress (bed type), PT/OT evaluation    Nutrition Interventions: Document food/fluid/supplement intake, Offer support with meals,snacks and hydration    Friction and Shear Interventions: HOB 30 degrees or less                Problem: Patient Education: Go to Patient Education Activity  Goal: Patient/Family Education  Outcome: Progressing Towards Goal     Problem: Falls - Risk of  Goal: *Absence of Falls  Description  Document Jeanne Fall Risk and appropriate interventions in the flowsheet.   Outcome: Progressing Towards Goal  Note: Fall Risk Interventions:  Mobility Interventions: Bed/chair exit alarm, Communicate number of staff needed for ambulation/transfer, Patient to call before getting OOB    Mentation Interventions: Bed/chair exit alarm, Door open when patient unattended, Increase mobility, More frequent rounding, Reorient patient    Medication Interventions: Bed/chair exit alarm, Evaluate medications/consider consulting pharmacy, Patient to call before getting OOB, Teach patient to arise slowly    Elimination Interventions: Bed/chair exit alarm, Call light in reach, Patient to call for help with toileting needs, Stay With Me (per policy), Toilet paper/wipes in reach, Toileting schedule/hourly rounds    History of Falls Interventions: Bed/chair exit alarm, Consult care management for discharge planning, Door open when patient unattended, Investigate reason for fall, Room close to nurse's station         Problem: Patient Education: Go to Patient Education Activity  Goal: Patient/Family Education  Outcome: Progressing Towards Goal     Problem: Diabetes Self-Management  Goal: *Disease process and treatment process  Description  Define diabetes and identify own type of diabetes; list 3 options for treating diabetes. Outcome: Progressing Towards Goal  Goal: *Incorporating nutritional management into lifestyle  Description  Describe effect of type, amount and timing of food on blood glucose; list 3 methods for planning meals. Outcome: Progressing Towards Goal  Goal: *Incorporating physical activity into lifestyle  Description  State effect of exercise on blood glucose levels. Outcome: Progressing Towards Goal  Goal: *Developing strategies to promote health/change behavior  Description  Define the ABC's of diabetes; identify appropriate screenings, schedule and personal plan for screenings. Outcome: Progressing Towards Goal  Goal: *Using medications safely  Description  State effect of diabetes medications on diabetes; name diabetes medication taking, action and side effects. Outcome: Progressing Towards Goal  Goal: *Monitoring blood glucose, interpreting and using results  Description  Identify recommended blood glucose targets  and personal targets. Outcome: Progressing Towards Goal  Goal: *Prevention, detection, treatment of acute complications  Description  List symptoms of hyper- and hypoglycemia; describe how to treat low blood sugar and actions for lowering  high blood glucose level.   Outcome: Progressing Towards Goal  Goal: *Prevention, detection and treatment of chronic complications  Description  Define the natural course of diabetes and describe the relationship of blood glucose levels to long term complications of diabetes.   Outcome: Progressing Towards Goal  Goal: *Developing strategies to address psychosocial issues  Description  Describe feelings about living with diabetes; identify support needed and support network  Outcome: Progressing Towards Goal     Problem: Patient Education: Go to Patient Education Activity  Goal: Patient/Family Education  Outcome: Progressing Towards Goal     Problem: Patient Education: Go to Patient Education Activity  Goal: Patient/Family Education  Outcome: Progressing Towards Goal     Problem: Nutrition Deficit  Goal: *Optimize nutritional status  Outcome: Progressing Towards Goal     Problem: Patient Education: Go to Patient Education Activity  Goal: Patient/Family Education  Outcome: Progressing Towards Goal     Problem: General Medical Care Plan  Goal: *Vital signs within specified parameters  Outcome: Progressing Towards Goal  Goal: *Labs within defined limits  Outcome: Progressing Towards Goal  Goal: *Absence of infection signs and symptoms  Description  Wash hand more often   Outcome: Progressing Towards Goal  Goal: *Optimal pain control at patient's stated goal  Outcome: Progressing Towards Goal  Goal: *Skin integrity maintained  Outcome: Progressing Towards Goal  Goal: *Fluid volume balance  Outcome: Progressing Towards Goal  Goal: *Optimize nutritional status  Outcome: Progressing Towards Goal  Goal: *Anxiety reduced or absent  Outcome: Progressing Towards Goal  Goal: *Progressive mobility and function (eg: ADL's)  Outcome: Progressing Towards Goal     Problem: Patient Education: Go to Patient Education Activity  Goal: Patient/Family Education  Outcome: Progressing Towards Goal     Problem: Patient Education: Go to Patient Education Activity  Goal: Patient/Family Education  Outcome: Progressing Towards Goal     Problem: Patient Education: Go to Patient Education Activity  Goal: Patient/Family Education  Outcome: Progressing Towards Goal     Problem: Patient Education: Go to Patient Education Activity  Goal: Patient/Family Education  Outcome: Progressing Towards Goal     Problem: Ischemic Stroke: Discharge Outcomes  Goal: *Verbalizes anxiety and depression are reduced or absent  Outcome: Progressing Towards Goal  Goal: *Verbalize understanding of risk factor modification(Stroke Metric)  Outcome: Progressing Towards Goal  Goal: *Hemodynamically stable  Outcome: Progressing Towards Goal  Goal: *Absence of aspiration pneumonia  Outcome: Progressing Towards Goal  Goal: *Aware of needed dietary changes  Outcome: Progressing Towards Goal  Goal: *Verbalize understanding of prescribed medications including anti-coagulants, anti-lipid, and/or anti-platelets(Stroke Metric)  Outcome: Progressing Towards Goal  Goal: *Tolerating diet  Outcome: Progressing Towards Goal  Goal: *Aware of follow-up diagnostics related to anticoagulants  Outcome: Progressing Towards Goal  Goal: *Ability to perform ADLs and demonstrates progressive mobility and function  Outcome: Progressing Towards Goal  Goal: *Absence of DVT(Stroke Metric)  Outcome: Progressing Towards Goal  Goal: *Absence of aspiration  Outcome: Progressing Towards Goal  Goal: *Optimal pain control at patient's stated goal  Outcome: Progressing Towards Goal  Goal: *Home safety concerns addressed  Outcome: Progressing Towards Goal  Goal: *Describes available resources and support systems  Outcome: Progressing Towards Goal  Goal: *Verbalizes understanding of activation of EMS(911) for stroke symptoms(Stroke Metric)  Outcome: Progressing Towards Goal  Goal: *Understands and describes signs and symptoms to report to providers(Stroke Metric)  Outcome: Progressing Towards Goal  Goal: *Neurolgocially stable (absence of additional neurological deficits)  Outcome: Progressing Towards Goal  Goal: *Verbalizes importance of follow-up with primary care physician(Stroke Metric)  Outcome: Progressing Towards Goal  Goal: *Smoking cessation discussed,if applicable(Stroke Metric)  Outcome: Progressing Towards Goal  Goal: *Depression screening completed(Stroke Metric)  Outcome: Progressing Towards Goal     Problem: Pain  Goal: *Control of Pain  Outcome: Progressing Towards Goal     Problem: Patient Education: Go to Patient Education Activity  Goal: Patient/Family Education  Outcome: Progressing Towards Goal     Problem: Patient Education: Go to Patient Education Activity  Goal: Patient/Family Education  Outcome: Progressing Towards Goal

## 2020-03-01 NOTE — PROGRESS NOTES
Problem: Pressure Injury - Risk of  Goal: *Prevention of pressure injury  Description  Document Dewey Scale and appropriate interventions in the flowsheet. Outcome: Progressing Towards Goal  Note: Pressure Injury Interventions:  Sensory Interventions: Assess changes in LOC, Chair cushion, Avoid rigorous massage over bony prominences    Moisture Interventions: Absorbent underpads, Limit adult briefs, Minimize layers    Activity Interventions: Increase time out of bed, Pressure redistribution bed/mattress(bed type)    Mobility Interventions: Pressure redistribution bed/mattress (bed type), HOB 30 degrees or less    Nutrition Interventions: Offer support with meals,snacks and hydration, Document food/fluid/supplement intake    Friction and Shear Interventions: Lift sheet, Minimize layers, HOB 30 degrees or less                Problem: Patient Education: Go to Patient Education Activity  Goal: Patient/Family Education  Outcome: Progressing Towards Goal     Problem: Falls - Risk of  Goal: *Absence of Falls  Description  Document Jeanne Fall Risk and appropriate interventions in the flowsheet.   Outcome: Progressing Towards Goal  Note: Fall Risk Interventions:  Mobility Interventions: Bed/chair exit alarm, Communicate number of staff needed for ambulation/transfer, Patient to call before getting OOB    Mentation Interventions: Bed/chair exit alarm, Door open when patient unattended, Increase mobility, More frequent rounding, Reorient patient    Medication Interventions: Evaluate medications/consider consulting pharmacy, Patient to call before getting OOB, Teach patient to arise slowly    Elimination Interventions: Bed/chair exit alarm, Call light in reach, Patient to call for help with toileting needs, Toilet paper/wipes in reach    History of Falls Interventions: Bed/chair exit alarm, Door open when patient unattended, Investigate reason for fall         Problem: Patient Education: Go to Patient Education Activity  Goal: Patient/Family Education  Outcome: Progressing Towards Goal     Problem: Diabetes Self-Management  Goal: *Disease process and treatment process  Description  Define diabetes and identify own type of diabetes; list 3 options for treating diabetes. Outcome: Progressing Towards Goal  Goal: *Incorporating nutritional management into lifestyle  Description  Describe effect of type, amount and timing of food on blood glucose; list 3 methods for planning meals. Outcome: Progressing Towards Goal  Goal: *Incorporating physical activity into lifestyle  Description  State effect of exercise on blood glucose levels. Outcome: Progressing Towards Goal  Goal: *Developing strategies to promote health/change behavior  Description  Define the ABC's of diabetes; identify appropriate screenings, schedule and personal plan for screenings. Outcome: Progressing Towards Goal  Goal: *Using medications safely  Description  State effect of diabetes medications on diabetes; name diabetes medication taking, action and side effects. Outcome: Progressing Towards Goal  Goal: *Monitoring blood glucose, interpreting and using results  Description  Identify recommended blood glucose targets  and personal targets. Outcome: Progressing Towards Goal  Goal: *Prevention, detection, treatment of acute complications  Description  List symptoms of hyper- and hypoglycemia; describe how to treat low blood sugar and actions for lowering  high blood glucose level. Outcome: Progressing Towards Goal  Goal: *Prevention, detection and treatment of chronic complications  Description  Define the natural course of diabetes and describe the relationship of blood glucose levels to long term complications of diabetes.   Outcome: Progressing Towards Goal  Goal: *Developing strategies to address psychosocial issues  Description  Describe feelings about living with diabetes; identify support needed and support network  Outcome: Progressing Towards Goal     Problem: Diabetes Self-Management  Goal: *Disease process and treatment process  Description  Define diabetes and identify own type of diabetes; list 3 options for treating diabetes. Outcome: Progressing Towards Goal  Goal: *Incorporating nutritional management into lifestyle  Description  Describe effect of type, amount and timing of food on blood glucose; list 3 methods for planning meals. Outcome: Progressing Towards Goal  Goal: *Incorporating physical activity into lifestyle  Description  State effect of exercise on blood glucose levels. Outcome: Progressing Towards Goal  Goal: *Developing strategies to promote health/change behavior  Description  Define the ABC's of diabetes; identify appropriate screenings, schedule and personal plan for screenings. Outcome: Progressing Towards Goal  Goal: *Using medications safely  Description  State effect of diabetes medications on diabetes; name diabetes medication taking, action and side effects. Outcome: Progressing Towards Goal  Goal: *Monitoring blood glucose, interpreting and using results  Description  Identify recommended blood glucose targets  and personal targets. Outcome: Progressing Towards Goal  Goal: *Prevention, detection, treatment of acute complications  Description  List symptoms of hyper- and hypoglycemia; describe how to treat low blood sugar and actions for lowering  high blood glucose level. Outcome: Progressing Towards Goal  Goal: *Prevention, detection and treatment of chronic complications  Description  Define the natural course of diabetes and describe the relationship of blood glucose levels to long term complications of diabetes.   Outcome: Progressing Towards Goal  Goal: *Developing strategies to address psychosocial issues  Description  Describe feelings about living with diabetes; identify support needed and support network  Outcome: Progressing Towards Goal     Problem: Patient Education: Go to Patient Education Activity  Goal: Patient/Family Education  Outcome: Progressing Towards Goal     Problem: General Medical Care Plan  Goal: *Vital signs within specified parameters  Outcome: Progressing Towards Goal  Goal: *Labs within defined limits  Outcome: Progressing Towards Goal  Goal: *Absence of infection signs and symptoms  Description  Wash hand more often   Outcome: Progressing Towards Goal  Goal: *Optimal pain control at patient's stated goal  Outcome: Progressing Towards Goal  Goal: *Skin integrity maintained  Outcome: Progressing Towards Goal  Goal: *Fluid volume balance  Outcome: Progressing Towards Goal  Goal: *Optimize nutritional status  Outcome: Progressing Towards Goal  Goal: *Anxiety reduced or absent  Outcome: Progressing Towards Goal  Goal: *Progressive mobility and function (eg: ADL's)  Outcome: Progressing Towards Goal     Problem: Patient Education: Go to Patient Education Activity  Goal: Patient/Family Education  Outcome: Progressing Towards Goal

## 2020-03-01 NOTE — PROGRESS NOTES
Hospitalist Progress Note     Admit Date:  2019  9:07 AM   Name:  Manjeet Clifford   Age:  53 y.o.  :  1975   MRN:  [id number]   PCP:  Nathaly Givens MD  Treatment Team: Attending Provider: Bird Baker MD; Care Manager: Kwan Keith RN; [place] Manager: Mariano Berg, [facility] [place]ization Review: Minoo Jones RN; Nurse Practitioner: Mortimer Hinders, NP    Subjective:   54 yo male with PMH of DM II, HTN who presented 19 with c/o left leg and arm weakness, left facial droop and dysarthria.  Code S called. MRI with acute infarctions in left cerebellar hemisphere, deep frontoparietal white matter and right paramedian yariel.  Also noted to have old lacunar infarcts. CTA without large vessel occlusions, however some stenosis noted.  Echo showed PFO, Cardiology to f/u outpatient for evaluation for closure.  Neurology consulted.  Started on ASA, statin.  Pt is uninsured, difficult placement, case management working on plan.     Today: No acute events. No concerns per patient.  has informed me there are currently no options for the patient to be safely discharged given his lack of funding. Nursing notes and chart reviewed. Review of Systems negative with exception of pertinent positives noted above.       Objective:     Patient Vitals for the past 24 hrs:   Temp Pulse Resp BP SpO2   20 0800 98.3 °F (36.8 °C) 67 18 121/76 97 %   20 0400 98.1 °F (36.7 °C) 75 19 130/83 91 %   20 0000 98.1 °F (36.7 °C) 68 19 136/84 95 %   20 2000 98.1 °F (36.7 °C) 68 19 127/79 94 %   20 1600 98.4 °F (36.9 °C) 70 19 129/82 98 %   20 1200 98.6 °F (37 °C) 79 19 137/80 95 %     Oxygen Therapy  O2 Sat (%): 97 % (20 0800)  Pulse via Oximetry: 66 beats per minute (20 0000)  O2 Device: Room air (20 0000)    Intake/Output Summary (Last 24 hours) at 3/1/2020 1038  Last data filed at 3/1/2020 0800  Gross per 24 hour   Intake 210 ml   Output    Net 210 ml         General:    Sitting up in bed, no acute distress  CV:   RRR. No murmur, rub, or gallop. Lungs:   CTAB. No wheezing, rhonchi, or rales. Abdomen:   Soft, nontender, nondistended. Extremities: Warm and dry. No cyanosis or edema. Skin:     No rashes or jaundice. Data Review:  I have reviewed all labs, meds, telemetry events, and studies from the last 24 hours.     Recent Results (from the past 24 hour(s))   GLUCOSE, POC    Collection Time: 03/01/20  7:11 AM   Result Value Ref Range    Glucose (POC) 84 65 - 100 mg/dL        All Micro Results     None          Current Meds:  Current Facility-Administered Medications   Medication Dose Route Frequency    metFORMIN (GLUCOPHAGE) tablet 1,000 mg  1,000 mg Oral DAILY WITH DINNER    metFORMIN (GLUCOPHAGE) tablet 500 mg  500 mg Oral DAILY WITH BREAKFAST    diphenhydrAMINE (BENADRYL) capsule 25 mg  25 mg Oral Q6H PRN    acetaminophen (TYLENOL) tablet 650 mg  650 mg Oral Q8H    lip protectant (BLISTEX) ointment 1 Each  1 Each Topical PRN    metoprolol succinate (TOPROL-XL) XL tablet 25 mg  25 mg Oral DAILY    polyethylene glycol (MIRALAX) packet 17 g  17 g Oral DAILY PRN    guaiFENesin (ROBITUSSIN) 100 mg/5 mL oral liquid 100 mg  100 mg Oral Q4H PRN    hydrALAZINE (APRESOLINE) 20 mg/mL injection 20 mg  20 mg IntraVENous Q4H PRN    atorvastatin (LIPITOR) tablet 80 mg  80 mg Oral QHS    lisinopril (PRINIVIL, ZESTRIL) tablet 40 mg  40 mg Oral DAILY    sodium chloride (NS) flush 5-40 mL  5-40 mL IntraVENous PRN    ondansetron (ZOFRAN) injection 4 mg  4 mg IntraVENous Q4H PRN    aspirin chewable tablet 81 mg  81 mg Oral DAILY    enoxaparin (LOVENOX) injection 40 mg  40 mg SubCUTAneous Q24H    dextrose 40% (GLUTOSE) oral gel 1 Tube  15 g Oral PRN    glucagon (GLUCAGEN) injection 1 mg  1 mg IntraMUSCular PRN    dextrose (D50W) injection syrg 12.5-25 g  25-50 mL IntraVENous PRN       Other Studies (last 24 hours):  No results found.    Assessment and Plan:     Hospital Problems as of 3/1/2020 Date Reviewed: 3/3/2017          Codes Class Noted - Resolved POA    PFO (patent foramen ovale) ICD-10-CM: Q21.1  ICD-9-CM: 745.5  12/17/2019 - Present Yes        Arrhythmia ICD-10-CM: I49.9  ICD-9-CM: 427.9  12/15/2019 - Present Yes        Hyperlipemia ICD-10-CM: E78.5  ICD-9-CM: 272.4  12/15/2019 - Present Yes        * (Principal) Acute embolic stroke [facility]) GVO-51-CU: I63.9  ICD-9-CM: 434.11  12/12/2019 - Present Yes        Hypertension (Chronic) ICD-10-CM: I10  ICD-9-CM: 401.9  Unknown - Present Yes        Type 2 diabetes mellitus (HCC) (Chronic) ICD-10-CM: E11.9  ICD-9-CM: 250.00  Unknown - Present Yes              PLAN:    Acute embolic stroke  -is \"outpatient ready\" so no routine labs indicated at this point  -MRI with acute infarcts in L cerebellar hemisphere, deep frontoparietal white matter, and R paramedian yariel. old lacunar infarcts. -ISMAEL: 3 mm PFO, Cardiology stated he needs an evaluation for closure PFO with high risk features including significant (3 mm) separation of septum secundum and primum as well as large shunt.  Will await recovery of CVA.  Plan is 4 week event monitor as outpatient. Elliott Cole then see in office and if no arrhythmias, will plan PFO closure  -continue statin, ASA     Hypertension:   -lisinopril, metoprolol     Type 2 diabetes mellitus:    -Patient currently on metformin 1500 mg daily, escalate to 2000 mg daily on 3/3/2020     Left upper extremity pain  -titrate down tylenol from q6h to q8h (2/23)     Allergies  Benadryl for allergies as needed    DISPO: waiting for pt to be deemed \"disabled\" which in neurologic cases have to allow for 6 months showing chronicity.  Then he can get medicaid after that. Medically stable for discharge.    DVT ppx:  lovenox   Signed:  Krish Sanchez MD

## 2020-03-02 PROCEDURE — 74011250636 HC RX REV CODE- 250/636: Performed by: INTERNAL MEDICINE

## 2020-03-02 PROCEDURE — 74011250637 HC RX REV CODE- 250/637: Performed by: INTERNAL MEDICINE

## 2020-03-02 PROCEDURE — 74011250637 HC RX REV CODE- 250/637: Performed by: HOSPITALIST

## 2020-03-02 PROCEDURE — 74011250637 HC RX REV CODE- 250/637: Performed by: FAMILY MEDICINE

## 2020-03-02 PROCEDURE — 65270000029 HC RM PRIVATE

## 2020-03-02 PROCEDURE — 97116 GAIT TRAINING THERAPY: CPT

## 2020-03-02 PROCEDURE — 97530 THERAPEUTIC ACTIVITIES: CPT

## 2020-03-02 RX ORDER — METFORMIN HYDROCHLORIDE 500 MG/1
1000 TABLET ORAL 2 TIMES DAILY WITH MEALS
Status: DISCONTINUED | OUTPATIENT
Start: 2020-03-02 | End: 2020-03-07

## 2020-03-02 RX ADMIN — ENOXAPARIN SODIUM 40 MG: 40 INJECTION SUBCUTANEOUS at 14:07

## 2020-03-02 RX ADMIN — LISINOPRIL 40 MG: 20 TABLET ORAL at 08:41

## 2020-03-02 RX ADMIN — ACETAMINOPHEN 650 MG: 325 TABLET, FILM COATED ORAL at 21:31

## 2020-03-02 RX ADMIN — METOPROLOL SUCCINATE 25 MG: 25 TABLET, FILM COATED, EXTENDED RELEASE ORAL at 08:41

## 2020-03-02 RX ADMIN — METFORMIN HYDROCHLORIDE 1000 MG: 500 TABLET ORAL at 17:58

## 2020-03-02 RX ADMIN — GUAIFENESIN 100 MG: 100 SOLUTION ORAL at 17:58

## 2020-03-02 RX ADMIN — ASPIRIN 81 MG 81 MG: 81 TABLET ORAL at 08:41

## 2020-03-02 RX ADMIN — ACETAMINOPHEN 650 MG: 325 TABLET, FILM COATED ORAL at 14:07

## 2020-03-02 RX ADMIN — ATORVASTATIN CALCIUM 80 MG: 80 TABLET, FILM COATED ORAL at 21:31

## 2020-03-02 RX ADMIN — DIPHENHYDRAMINE HYDROCHLORIDE 25 MG: 25 CAPSULE ORAL at 17:58

## 2020-03-02 RX ADMIN — GUAIFENESIN 100 MG: 100 SOLUTION ORAL at 21:31

## 2020-03-02 RX ADMIN — METFORMIN HYDROCHLORIDE 500 MG: 500 TABLET ORAL at 08:41

## 2020-03-02 NOTE — PROGRESS NOTES
Problem: Mobility Impaired (Adult and Pediatric)  Goal: *Acute Goals and Plan of Care  Description  GOALS UPDATED ON RE-ASSESSMENT 2/26/2020  1. Patient will perform bed mobility with supervision and 0 verbal cues within 7 treatment days. 2. Patient will perform STS transfer with CGA within 7 treatment days. 3. Patient will perform bed to (wheel) chair transfer with CGA with 7 treatment days. 4. Patient will demonstrate fair+ dynamic balance throughout stance phase on L LE within 7 treatment days. 5. Patient will require CGA throughout swing phase on (to advance) L LE within 7 treatment days. 6. Patient demonstrate 3+/5 strength in L LE hip flexion, hip abduction, and hip adduction) within 7 treatment days. 7. Patient will ambulate 100ft+ with CGA and least retrictive assistive device within 7 treatment days. PHYSICAL THERAPY: Daily Note and PM 3/2/2020  INPATIENT: PT Visit Days : 4  Payor: MEDICAID PENDING / Plan: [employer] PENDING / Product Type: Medicaid /       NAME/AGE/GENDER: Emmanuel Lloyd is a 53 y.o. male   PRIMARY DIAGNOSIS: Acute ischemic stroke ([address] [I63.9]  Cerebrovascular accident (CVA) due to embolism ([address] [R64.4] Acute embolic stroke ([address] Acute embolic stroke ([address]       ICD-10: Treatment Diagnosis:   · Difficulty in walking, Not elsewhere classified (R26.2)  · Hemiplegia and hemiparesis following cerebral infarction affecting left non-dominant side (G45.276)   Precaution/Allergies:  Patient has no known allergies. ASSESSMENT:     Mr. Leonid Baumann is a 53year old admitted R MCA CVA. Patient is currently demonstrating trace quad activation, 3-/5 hip flexion, 3-/5 hip adduction, 2+/5 hip abduction, and 2+/5 hamstring/knee flexion. The patient is supine in bed upon contact and agreeable to PT treatment. He performed bed mobility using log rolling technique as taught without cues and came to sitting with SBA.   He demonstrated intact sitting balance at the edge of the bed and then performed sit to stand with CGA. He ambulated 61' with the luke-walker and CGA/min A and verbal/manual cues for advancing the LLE with good mechanics (knee flexion and foot placement) and manual assist preventing hyper-extension of the L knee. He took a seated rest break in the w/c and performed w/c mobility with min A for 20' using BLE for advancing and navigating. The patient then stood and ambulated an additional 61' with the same. He required cues for safety awareness as he fatigued. He then sat in the chair and performed w/c mobility for an additional 20' with min A. He then returned to his room and performed SPT from w/c to bed with CGA. He demonstrated some increased LOB during ambulation today as he fatigued and needs continued constant cueing for advancing the LE. The patient performed sit to supine and was positioned for comfort with needs in reach. Overall the patient is making good progress toward therapy goals as indicated by increased activity tolerance. Will continue skilled PT treatment as patient is still below functional baseline. This section established at most recent assessment   PROBLEM LIST (Impairments causing functional limitations):  1. Decreased Strength  2. Decreased ADL/Functional Activities  3. Decreased Transfer Abilities  4. Decreased Ambulation Ability/Technique  5. Decreased Balance  6. Increased Pain  7. Decreased Activity Tolerance  8. Increased Fatigue  9. Decreased Flexibility/Joint Mobility   INTERVENTIONS PLANNED: (Benefits and precautions of physical therapy have been discussed with the patient.)  1. Balance Exercise  2. Bed Mobility  3. Family Education  4. Gait Training  5. Home Exercise Program (HEP)  6. Manual Therapy  7. Neuromuscular Re-education/Strengthening  8. Range of Motion (ROM)  9. Therapeutic Activites  10. Therapeutic Exercise/Strengthening  11.  Transfer Training     TREATMENT PLAN: Frequency/Duration: 3 times a week for duration of hospital stay  Rehabilitation Potential For Stated Goals: Good     REHAB RECOMMENDATIONS (at time of discharge pending progress):    Placement: It is my opinion, based on this patient's performance to date, that Mr. Kathleen Jensen may benefit from intensive therapy at an [address] [place] [address] after discharge due to a probable need for close medical supervision by a rehab physician, a probable need for multiple therapy disciplines and potential to make ongoing and sustainable functional improvement that is of practical value. .  Equipment:    TBD pending progress with therapy. HISTORY:   History of Present Injury/Illness (Reason for Referral):  Per H&P: \"Pt is a 52 y/o smoker with DM, HTN, who presented to ER with L leg and arm weakness, L facial droop, dysarthria. First noted L leg weakness late night 12/11 when he woke up to go to the bathroom. He was normal when he went to bed around 1030. Woke up this morning and had persistent weakness L leg and also now noted in L arm. EMS called. Noted to have slurred speech and L facial droop as well. Code S called in ER around 9am.  MRI with acute infarcts in L cerebellar hemisphere, deep frontoparietal white matter, and R paramedian yariel. Also noted old lacunar infarcts. No large vessel occlusion on CTA, but some stenosis noted. No hx afib, TIA, CVA. No CP, palpitations, SOB. \"  Past Medical History/Comorbidities:   Mr. Kathleen Jensen  has a past medical history of Acute ischemic stroke ([address] (12/12/2019), Acute pancreatitis (11/19/2014), Cerebrovascular accident (CVA) due to embolism ([address] (12/12/2019), Diabetes ([address] (2002), Diabetes ([address] Diabetes mellitus, and Hypertension.  He also has no past medical history of Arthritis, Asthma, Autoimmune disease ([address] CAD (coronary artery disease), Cancer ([address] Chronic kidney disease, COPD, Dementia, Dementia ([address] Heart failure ([address] Ill-defined condition, Infectious disease, Liver disease, Other ill-defined conditions(799.89), Psychiatric disorder, PUD (peptic ulcer disease), Seizures ([facility] or Sleep disorder. Mr. Yvette Lindsey  has a past surgical history that includes hx hernia repair and hx orthopaedic. Social History/Living Environment:   Home Environment: Apartment  # Steps to Enter: 12  Rails to Enter: Yes  [employer] : Bilateral  One/Two Story Residence: One story  Living Alone: No  Support Systems: Spouse/Significant Other/Partner  Patient Expects to be Discharged to[de-identified] Unknown  Current DME Used/Available at Home: None  Tub or Shower Type: Tub/Shower combination  Prior Level of Function/Work/Activity:  Independent, lives with wife in 2nd story 1 level apartment. No recent falls. Number of Personal Factors/Comorbidities that affect the Plan of Care: 1-2: MODERATE COMPLEXITY   EXAMINATION:   Most Recent Physical Functioning:   Gross Assessment:                  Posture:     Balance:  Sitting: Intact  Standing: With support  Standing - Static: Fair  Standing - Dynamic : Fair Bed Mobility:  Supine to Sit: Stand-by assistance  Sit to Supine: Stand-by assistance  Scooting: Stand-by assistance  Interventions: Safety awareness training  Wheelchair Mobility:  Distance (ft): 40 feet  Time Required for Task: 10 minutes  Level of Assistance: Minimum assistance  Interventions: Verbal cues  Transfers:  Sit to Stand: Contact guard assistance  Stand to Sit: Contact guard assistance  Bed to Chair: Minimum assistance  Interventions: Verbal cues  Gait:     Base of Support: Center of gravity altered  Speed/Clotilde: Slow;Delayed  Step Length: Right shortened;Left shortened  Gait Abnormalities: Hemiplegic  Distance (ft): 60 Feet (ft)(x2)  Assistive Device: Walker luke;Gait belt  Ambulation - Level of Assistance: Minimal assistance  Interventions: Safety awareness training;Manual cues; Verbal cues      Body Structures Involved:  1. Nerves  2. Voice/Speech  3. Bones  4. Joints  5.  Muscles Body Functions Affected:  1. Mental  2. Sensory/Pain  3. Neuromusculoskeletal  4. Movement Related Activities and Participation Affected:  1. General Tasks and Demands  2. Mobility  3. Self Care  4. Interpersonal Interactions and Relationships   Number of elements that affect the Plan of Care: 4+: HIGH COMPLEXITY   CLINICAL PRESENTATION:   Presentation: Evolving clinical presentation with changing clinical characteristics: MODERATE COMPLEXITY   CLINICAL DECISION MAKIN [facility] Mobility Inpatient Short Form  How much difficulty does the patient currently have. .. Unable A Lot A Little None   1. Turning over in bed (including adjusting bedclothes, sheets and blankets)? [] 1   [] 2   [x] 3   [] 4   2. Sitting down on and standing up from a chair with arms ( e.g., wheelchair, bedside commode, etc.)   [] 1   [] 2   [x] 3   [] 4   3. Moving from lying on back to sitting on the side of the bed? [] 1   [] 2   [x] 3   [] 4   How much help from another person does the patient currently need. .. Total A Lot A Little None   4. Moving to and from a bed to a chair (including a wheelchair)? [] 1   [] 2   [x] 3   [] 4   5. Need to walk in hospital room? [] 1   [x] 2   [] 3   [] 4   6. Climbing 3-5 steps with a railing? [] 1   [x] 2   [] 3   [] 4   © 2007, Trustees of [address] [place] [address], under license to [employer]. All rights reserved      Score:  Initial: 13 Most Recent: 16 (Date:2020)    Interpretation of Tool:  Represents activities that are increasingly more difficult (i.e. Bed mobility, Transfers, Gait). Medical Necessity:     · Patient is expected to demonstrate progress in   · strength, range of motion, balance, coordination, and functional technique  ·  to   · increase independence with all mobility.    · .  Reason for Services/Other Comments:  · Patient continues to require skilled intervention due to   · medical complications and mobility deficits which impact his level of function, safety, and independence as indicated above. · .   Use of outcome tool(s) and clinical judgement create a POC that gives a: Questionable prediction of patient's progress: MODERATE COMPLEXITY        TREATMENT:      Pre-treatment Symptoms/Complaints:  none  Pain: Initial: 0/10 Post Session:  0/10     Physical Therapy Re-Evaluation (untimed charge)    Gait Training (  23 Minutes):  Gait training to improve and/or restore physical functioning as related to mobility, strength and balance. Ambulated 60 Feet (ft)(x2) with Minimal assistance using a Walker luke;Gait belt and moderate Safety awareness training;Manual cues; Verbal cues related to their sequencing, knee positioning, foot placement, step length, weight shifts, luke walker placement, and posture to promote proper body alignment, promote proper body posture and promote proper body mechanics. Instruction in performance of the above deficits to correct overall gait sequencing and quality. Therapeutic Activity: (    16 min): Therapeutic activities including Bed transfers, Chair transfers and w/c transfers to improve mobility, strength and balance. Required moderate Safety awareness training;Manual cues; Verbal cues to promote static and dynamic balance in standing. Braces/Orthotics/Lines/Etc:   · Sling to L UE throughout transfers and ambulation  Treatment/Session Assessment:    · Response to Treatment:  See above  · Interdisciplinary Collaboration:   o Physical Therapy Assistant  o Registered Nurse  o Rehabilitation Attendant  · After treatment position/precautions:   o Supine in bed  o Bed/Chair-wheels locked  o Bed in low position  o Call light within reach  o RN notified   · Compliance with Program/Exercises: Compliant all of the time  · Recommendations/Intent for next treatment session: \"Next visit will focus on advancements to more challenging activities and reduction in assistance provided\".     Total Treatment Duration:  PT Patient Time In/Time Out  Time In: 1310  Time Out: Giana 605, PTA

## 2020-03-02 NOTE — PROGRESS NOTES
Nutrition Assessment for:   Follow up     Assessment: Pt is 54 yo male who presented with weakness, facial drooping and slurred speech. He has history of CVA, DM type 2, HTN, pancreatitis, GERD and a smoker. He remains hospitalized awaiting medicaid eligibility. Sitting up in bed at RD visit. Reports he is eating and drinking well. He continues to consume glucerna. He denies any acute issues. Last recorded BM 3/1. Diet: DIET NUTRITIONAL SUPPLEMENTS All Meals; Glucerna Shake  DIET DIABETIC CONSISTENT CARB Mechanical Soft      Intake including ONS continues to meet needs at this time. Results for Pasco Burkitt (MRN [id number]) as of 3/2/2020 11:24   Ref. Range 2/23/2020 08:44 2/24/2020 07:17 2/26/2020 08:01 2/27/2020 08:24 3/1/2020 07:11   GLUCOSE,FAST - POC Latest Ref Range: 65 - 100 mg/dL 230 (H) 110 (H) 169 (H) 134 (H) 84      Anthropometrics:  Height: 5' 6\" (167.6 cm), Weight: 95.3 kg (210 lb), source not specified, Body mass index is 33.89 kg/m². BMI class of obese.     Macronutrient needs: MSJ (using CBW (Current body weight) unknown 95.3 kg)  EER: 2130 kcal/day (22 kcals/kg )  EPR: 106 g/day (1.1 g/kg ) (20%)     Intake/Comparative Standards: Per patient, consumes % of meals, portions of glucerna. This meets % of kcal needs and % of protein needs.     Nutrition Intervention:  Meals and snacks: Continue current diet. Progressions per SLP. Medical food supplement therapy: Continue Glucerna TID (Zulema Jaramillo). Blood glucose remains appropriate for continued use. Coordination of Nutrition Care: Discussed with Terry Cuevas RN. Discharge Nutrition Plan: With improving PO intake, frequency of Glucerna may be reduced or discontinued at discharge.     [place], [address] [place] [address], [address]

## 2020-03-02 NOTE — PROGRESS NOTES
Problem: Patient Education: Go to Patient Education Activity  Goal: Patient/Family Education  Outcome: Progressing Towards Goal     Problem: Pressure Injury - Risk of  Goal: *Prevention of pressure injury  Description  Document Dewey Scale and appropriate interventions in the flowsheet. Outcome: Progressing Towards Goal  Note: Pressure Injury Interventions:  Sensory Interventions: Assess changes in LOC, Avoid rigorous massage over bony prominences    Moisture Interventions: Absorbent underpads, Limit adult briefs, Minimize layers    Activity Interventions: Pressure redistribution bed/mattress(bed type), Increase time out of bed    Mobility Interventions: HOB 30 degrees or less, Pressure redistribution bed/mattress (bed type)    Nutrition Interventions: Offer support with meals,snacks and hydration, Document food/fluid/supplement intake    Friction and Shear Interventions: HOB 30 degrees or less                Problem: Patient Education: Go to Patient Education Activity  Goal: Patient/Family Education  Outcome: Progressing Towards Goal     Problem: Falls - Risk of  Goal: *Absence of Falls  Description  Document Jeanne Fall Risk and appropriate interventions in the flowsheet.   Outcome: Progressing Towards Goal  Note: Fall Risk Interventions:  Mobility Interventions: Bed/chair exit alarm, Communicate number of staff needed for ambulation/transfer, Patient to call before getting OOB    Mentation Interventions: Door open when patient unattended, Bed/chair exit alarm, Increase mobility, More frequent rounding, Reorient patient    Medication Interventions: Bed/chair exit alarm, Evaluate medications/consider consulting pharmacy, Patient to call before getting OOB, Teach patient to arise slowly    Elimination Interventions: Bed/chair exit alarm, Call light in reach, Patient to call for help with toileting needs, Stay With Me (per policy), Toilet paper/wipes in reach, Toileting schedule/hourly rounds    History of Falls Interventions: Bed/chair exit alarm, Consult care management for discharge planning, Door open when patient unattended, Investigate reason for fall, Room close to nurse's station         Problem: Patient Education: Go to Patient Education Activity  Goal: Patient/Family Education  Outcome: Progressing Towards Goal     Problem: Diabetes Self-Management  Goal: *Disease process and treatment process  Description  Define diabetes and identify own type of diabetes; list 3 options for treating diabetes. Outcome: Progressing Towards Goal  Goal: *Incorporating nutritional management into lifestyle  Description  Describe effect of type, amount and timing of food on blood glucose; list 3 methods for planning meals. Outcome: Progressing Towards Goal  Goal: *Incorporating physical activity into lifestyle  Description  State effect of exercise on blood glucose levels. Outcome: Progressing Towards Goal  Goal: *Developing strategies to promote health/change behavior  Description  Define the ABC's of diabetes; identify appropriate screenings, schedule and personal plan for screenings. Outcome: Progressing Towards Goal  Goal: *Using medications safely  Description  State effect of diabetes medications on diabetes; name diabetes medication taking, action and side effects. Outcome: Progressing Towards Goal  Goal: *Monitoring blood glucose, interpreting and using results  Description  Identify recommended blood glucose targets  and personal targets. Outcome: Progressing Towards Goal  Goal: *Prevention, detection, treatment of acute complications  Description  List symptoms of hyper- and hypoglycemia; describe how to treat low blood sugar and actions for lowering  high blood glucose level.   Outcome: Progressing Towards Goal  Goal: *Prevention, detection and treatment of chronic complications  Description  Define the natural course of diabetes and describe the relationship of blood glucose levels to long term complications of diabetes.   Outcome: Progressing Towards Goal  Goal: *Developing strategies to address psychosocial issues  Description  Describe feelings about living with diabetes; identify support needed and support network  Outcome: Progressing Towards Goal     Problem: General Medical Care Plan  Goal: *Vital signs within specified parameters  Outcome: Progressing Towards Goal  Goal: *Labs within defined limits  Outcome: Progressing Towards Goal  Goal: *Absence of infection signs and symptoms  Description  Wash hand more often   Outcome: Progressing Towards Goal  Goal: *Optimal pain control at patient's stated goal  Outcome: Progressing Towards Goal  Goal: *Skin integrity maintained  Outcome: Progressing Towards Goal  Goal: *Fluid volume balance  Outcome: Progressing Towards Goal  Goal: *Anxiety reduced or absent  Outcome: Progressing Towards Goal  Goal: *Progressive mobility and function (eg: ADL's)  Outcome: Progressing Towards Goal     Problem: Patient Education: Go to Patient Education Activity  Goal: Patient/Family Education  Outcome: Progressing Towards Goal     Problem: Patient Education: Go to Patient Education Activity  Goal: Patient/Family Education  Outcome: Progressing Towards Goal

## 2020-03-02 NOTE — PROGRESS NOTES
SPEECH PATHOLOGY NOTE:    Attempted to see patient for cognitive linguistic treatment this PM. Patient politely declined reporting stomach pain. RN notified. Will check back at later date.       Francisca Mosley MS, CCC-SLP

## 2020-03-02 NOTE — PROGRESS NOTES
Problem: Patient Education: Go to Patient Education Activity  Goal: Patient/Family Education  Outcome: Progressing Towards Goal     Problem: Pressure Injury - Risk of  Goal: *Prevention of pressure injury  Description  Document Dewey Scale and appropriate interventions in the flowsheet. Outcome: Progressing Towards Goal  Note: Pressure Injury Interventions:  Sensory Interventions: Assess changes in LOC, Avoid rigorous massage over bony prominences    Moisture Interventions: Absorbent underpads, Check for incontinence Q2 hours and as needed    Activity Interventions: Increase time out of bed, Pressure redistribution bed/mattress(bed type), PT/OT evaluation    Mobility Interventions: HOB 30 degrees or less, Pressure redistribution bed/mattress (bed type), PT/OT evaluation    Nutrition Interventions: Document food/fluid/supplement intake, Offer support with meals,snacks and hydration    Friction and Shear Interventions: HOB 30 degrees or less                Problem: Patient Education: Go to Patient Education Activity  Goal: Patient/Family Education  Outcome: Progressing Towards Goal     Problem: Falls - Risk of  Goal: *Absence of Falls  Description  Document Jeanne Fall Risk and appropriate interventions in the flowsheet.   Outcome: Progressing Towards Goal  Note: Fall Risk Interventions:  Mobility Interventions: Bed/chair exit alarm, Communicate number of staff needed for ambulation/transfer, Patient to call before getting OOB    Mentation Interventions: Door open when patient unattended, Bed/chair exit alarm, Increase mobility, More frequent rounding, Reorient patient    Medication Interventions: Bed/chair exit alarm, Patient to call before getting OOB, Teach patient to arise slowly    Elimination Interventions: Bed/chair exit alarm, Call light in reach, Patient to call for help with toileting needs, Stay With Me (per policy), Toilet paper/wipes in reach, Toileting schedule/hourly rounds    History of Falls Interventions: Bed/chair exit alarm, Consult care management for discharge planning, Door open when patient unattended, Investigate reason for fall         Problem: Patient Education: Go to Patient Education Activity  Goal: Patient/Family Education  Outcome: Progressing Towards Goal     Problem: Diabetes Self-Management  Goal: *Disease process and treatment process  Description  Define diabetes and identify own type of diabetes; list 3 options for treating diabetes. Outcome: Progressing Towards Goal  Goal: *Incorporating nutritional management into lifestyle  Description  Describe effect of type, amount and timing of food on blood glucose; list 3 methods for planning meals. Outcome: Progressing Towards Goal  Goal: *Incorporating physical activity into lifestyle  Description  State effect of exercise on blood glucose levels. Outcome: Progressing Towards Goal  Goal: *Developing strategies to promote health/change behavior  Description  Define the ABC's of diabetes; identify appropriate screenings, schedule and personal plan for screenings. Outcome: Progressing Towards Goal  Goal: *Using medications safely  Description  State effect of diabetes medications on diabetes; name diabetes medication taking, action and side effects. Outcome: Progressing Towards Goal  Goal: *Monitoring blood glucose, interpreting and using results  Description  Identify recommended blood glucose targets  and personal targets. Outcome: Progressing Towards Goal  Goal: *Prevention, detection, treatment of acute complications  Description  List symptoms of hyper- and hypoglycemia; describe how to treat low blood sugar and actions for lowering  high blood glucose level. Outcome: Progressing Towards Goal  Goal: *Prevention, detection and treatment of chronic complications  Description  Define the natural course of diabetes and describe the relationship of blood glucose levels to long term complications of diabetes.   Outcome: Progressing Towards Goal  Goal: *Developing strategies to address psychosocial issues  Description  Describe feelings about living with diabetes; identify support needed and support network  Outcome: Progressing Towards Goal     Problem: Patient Education: Go to Patient Education Activity  Goal: Patient/Family Education  Outcome: Progressing Towards Goal     Problem: Patient Education: Go to Patient Education Activity  Goal: Patient/Family Education  Outcome: Progressing Towards Goal     Problem: Nutrition Deficit  Goal: *Optimize nutritional status  Outcome: Progressing Towards Goal     Problem: Patient Education: Go to Patient Education Activity  Goal: Patient/Family Education  Outcome: Progressing Towards Goal     Problem: General Medical Care Plan  Goal: *Vital signs within specified parameters  Outcome: Progressing Towards Goal  Goal: *Labs within defined limits  Outcome: Progressing Towards Goal  Goal: *Absence of infection signs and symptoms  Description  Wash hand more often   Outcome: Progressing Towards Goal  Goal: *Optimal pain control at patient's stated goal  Outcome: Progressing Towards Goal  Goal: *Skin integrity maintained  Outcome: Progressing Towards Goal  Goal: *Fluid volume balance  Outcome: Progressing Towards Goal  Goal: *Optimize nutritional status  Outcome: Progressing Towards Goal  Goal: *Anxiety reduced or absent  Outcome: Progressing Towards Goal  Goal: *Progressive mobility and function (eg: ADL's)  Outcome: Progressing Towards Goal     Problem: Patient Education: Go to Patient Education Activity  Goal: Patient/Family Education  Outcome: Progressing Towards Goal     Problem: Patient Education: Go to Patient Education Activity  Goal: Patient/Family Education  Outcome: Progressing Towards Goal     Problem: Patient Education: Go to Patient Education Activity  Goal: Patient/Family Education  Outcome: Progressing Towards Goal     Problem: Patient Education: Go to Patient Education Activity  Goal: Patient/Family Education  Outcome: Progressing Towards Goal     Problem: Ischemic Stroke: Discharge Outcomes  Goal: *Verbalizes anxiety and depression are reduced or absent  Outcome: Progressing Towards Goal  Goal: *Verbalize understanding of risk factor modification(Stroke Metric)  Outcome: Progressing Towards Goal  Goal: *Hemodynamically stable  Outcome: Progressing Towards Goal  Goal: *Absence of aspiration pneumonia  Outcome: Progressing Towards Goal  Goal: *Aware of needed dietary changes  Outcome: Progressing Towards Goal  Goal: *Verbalize understanding of prescribed medications including anti-coagulants, anti-lipid, and/or anti-platelets(Stroke Metric)  Outcome: Progressing Towards Goal  Goal: *Tolerating diet  Outcome: Progressing Towards Goal  Goal: *Aware of follow-up diagnostics related to anticoagulants  Outcome: Progressing Towards Goal  Goal: *Ability to perform ADLs and demonstrates progressive mobility and function  Outcome: Progressing Towards Goal  Goal: *Absence of DVT(Stroke Metric)  Outcome: Progressing Towards Goal  Goal: *Absence of aspiration  Outcome: Progressing Towards Goal  Goal: *Optimal pain control at patient's stated goal  Outcome: Progressing Towards Goal  Goal: *Home safety concerns addressed  Outcome: Progressing Towards Goal  Goal: *Describes available resources and support systems  Outcome: Progressing Towards Goal  Goal: *Verbalizes understanding of activation of EMS(911) for stroke symptoms(Stroke Metric)  Outcome: Progressing Towards Goal  Goal: *Understands and describes signs and symptoms to report to providers(Stroke Metric)  Outcome: Progressing Towards Goal  Goal: *Neurolgocially stable (absence of additional neurological deficits)  Outcome: Progressing Towards Goal  Goal: *Verbalizes importance of follow-up with primary care physician(Stroke Metric)  Outcome: Progressing Towards Goal  Goal: *Smoking cessation discussed,if applicable(Stroke Metric)  Outcome: Progressing Towards Goal  Goal: *Depression screening completed(Stroke Metric)  Outcome: Progressing Towards Goal     Problem: Pain  Goal: *Control of Pain  Outcome: Progressing Towards Goal     Problem: Patient Education: Go to Patient Education Activity  Goal: Patient/Family Education  Outcome: Progressing Towards Goal     Problem: Patient Education: Go to Patient Education Activity  Goal: Patient/Family Education  Outcome: Progressing Towards Goal

## 2020-03-02 NOTE — PROGRESS NOTES
Hospitalist Progress Note     Admit Date:  2019  9:07 AM   Name:  Yo Notice   Age:  53 y.o.  :  1975   MRN:  [id number]   PCP:  Alexis Schmidt MD  Treatment Team: Attending Provider: Al Mercado MD; Care Manager: Ivett Liriano RN; Care Manager: Sheila Toure LM; Utilization Review: Lucie Royal RN; Nurse Practitioner: Kenyatta Yanes NP; Physical Therapy Assistant: Betty Tatum; Charge Nurse: Ruma Nava Speech Language Pathologist: Cyn Macario, HYACINTH    Subjective:   54 yo male with PMH of DM II, HTN who presented 17 with c/o left leg and arm weakness, left facial droop and dysarthria.  Code S called. MRI with acute infarctions in left cerebellar hemisphere, deep frontoparietal white matter and right paramedian yariel.  Also noted to have old lacunar infarcts. CTA without large vessel occlusions, however some stenosis noted.  Echo showed PFO, Cardiology to f/u outpatient for evaluation for closure.  Neurology consulted.  Started on ASA, statin.  Pt is uninsured, difficult placement, case management working on plan.     Today: No acute events. No concerns per patient.  has informed me there are currently no options for the patient to be safely discharged given his lack of funding. Nursing notes and chart reviewed. Review of Systems negative with exception of pertinent positives noted above.       Objective:     Patient Vitals for the past 24 hrs:   Temp Pulse Resp BP SpO2   20 1200 97.9 °F (36.6 °C) 69 16 143/89 98 %   20 0800 98.1 °F (36.7 °C) 75 17 127/78 95 %   20 0400 98.2 °F (36.8 °C) 73 18 132/86 (!) 73 %   20 0000 97.9 °F (36.6 °C) 71 18 133/88 97 %   20 2000 98.4 °F (36.9 °C) 68 18 129/87 96 %   20 1600 98.4 °F (36.9 °C) 74 18 123/84 99 %     Oxygen Therapy  O2 Sat (%): 98 % (03/02/20 1200)  Pulse via Oximetry: 66 beats per minute (20)  O2 Device: Room air (20)  No intake or output data in the 24 hours ending 03/02/20 1405      General:    Lying in bed watching VH1  CV:   RRR. No murmur, rub, or gallop. Lungs:   CTAB. No wheezing, rhonchi, or rales. Abdomen:   Soft, nontender, nondistended. Extremities: Warm and dry. No cyanosis or edema. Skin:     No rashes or jaundice. Data Review:  I have reviewed all labs, meds, telemetry events, and studies from the last 24 hours. No results found for this or any previous visit (from the past 24 hour(s)). All Micro Results     None          Current Meds:  Current Facility-Administered Medications   Medication Dose Route Frequency    metFORMIN (GLUCOPHAGE) tablet 1,000 mg  1,000 mg Oral DAILY WITH DINNER    metFORMIN (GLUCOPHAGE) tablet 500 mg  500 mg Oral DAILY WITH BREAKFAST    diphenhydrAMINE (BENADRYL) capsule 25 mg  25 mg Oral Q6H PRN    acetaminophen (TYLENOL) tablet 650 mg  650 mg Oral Q8H    lip protectant (BLISTEX) ointment 1 Each  1 Each Topical PRN    metoprolol succinate (TOPROL-XL) XL tablet 25 mg  25 mg Oral DAILY    polyethylene glycol (MIRALAX) packet 17 g  17 g Oral DAILY PRN    guaiFENesin (ROBITUSSIN) 100 mg/5 mL oral liquid 100 mg  100 mg Oral Q4H PRN    hydrALAZINE (APRESOLINE) 20 mg/mL injection 20 mg  20 mg IntraVENous Q4H PRN    atorvastatin (LIPITOR) tablet 80 mg  80 mg Oral QHS    lisinopril (PRINIVIL, ZESTRIL) tablet 40 mg  40 mg Oral DAILY    sodium chloride (NS) flush 5-40 mL  5-40 mL IntraVENous PRN    ondansetron (ZOFRAN) injection 4 mg  4 mg IntraVENous Q4H PRN    aspirin chewable tablet 81 mg  81 mg Oral DAILY    enoxaparin (LOVENOX) injection 40 mg  40 mg SubCUTAneous Q24H    dextrose 40% (GLUTOSE) oral gel 1 Tube  15 g Oral PRN    glucagon (GLUCAGEN) injection 1 mg  1 mg IntraMUSCular PRN    dextrose (D50W) injection syrg 12.5-25 g  25-50 mL IntraVENous PRN       Other Studies (last 24 hours):  No results found.     Assessment and Plan:     Hospital Problems as of 3/2/2020 Date Reviewed: 3/3/2017          Codes Class Noted - Resolved POA    PFO (patent foramen ovale) ICD-10-CM: Q21.1  ICD-9-CM: 745.5  12/17/2019 - Present Yes        Arrhythmia ICD-10-CM: I49.9  ICD-9-CM: 427.9  12/15/2019 - Present Yes        Hyperlipemia ICD-10-CM: E78.5  ICD-9-CM: 272.4  12/15/2019 - Present Yes        * (Principal) Acute embolic stroke [facility]) VWT-46-FS: I63.9  ICD-9-CM: 434.11  12/12/2019 - Present Yes        Hypertension (Chronic) ICD-10-CM: I10  ICD-9-CM: 401.9  Unknown - Present Yes        Type 2 diabetes mellitus (HCC) (Chronic) ICD-10-CM: E11.9  ICD-9-CM: 250.00  Unknown - Present Yes              PLAN:    Acute embolic stroke  -is \"outpatient ready\" so no routine labs indicated at this point  -MRI with acute infarcts in L cerebellar hemisphere, deep frontoparietal white matter, and R paramedian yariel. old lacunar infarcts. -ISMAEL: 3 mm PFO, Cardiology stated he needs an evaluation for closure PFO with high risk features including significant (3 mm) separation of septum secundum and primum as well as large shunt.  Will await recovery of CVA.  Plan is 4 week event monitor as outpatient.  Will then see in office and if no arrhythmias, will plan PFO closure  -continue statin, ASA     Hypertension:   -lisinopril, metoprolol     Type 2 diabetes mellitus:    -Patient currently on metformin 2000mg daily     Left upper extremity pain  -titrate down tylenol from q6h to q8h (2/23)     Allergies  Benadryl for allergies as needed    DISPO: waiting for pt to be deemed \"disabled\" which in neurologic cases have to allow for 6 months showing chronicity.  Then he can get medicaid after that. Medically stable for discharge.    DVT ppx:  lovenox   Signed:  Miles Mancuso MD

## 2020-03-03 PROCEDURE — 97535 SELF CARE MNGMENT TRAINING: CPT

## 2020-03-03 PROCEDURE — 97168 OT RE-EVAL EST PLAN CARE: CPT

## 2020-03-03 PROCEDURE — 74011250637 HC RX REV CODE- 250/637: Performed by: INTERNAL MEDICINE

## 2020-03-03 PROCEDURE — 74011250637 HC RX REV CODE- 250/637: Performed by: HOSPITALIST

## 2020-03-03 PROCEDURE — 97112 NEUROMUSCULAR REEDUCATION: CPT

## 2020-03-03 PROCEDURE — 92507 TX SP LANG VOICE COMM INDIV: CPT

## 2020-03-03 PROCEDURE — 97110 THERAPEUTIC EXERCISES: CPT

## 2020-03-03 PROCEDURE — 65270000029 HC RM PRIVATE

## 2020-03-03 PROCEDURE — 97116 GAIT TRAINING THERAPY: CPT

## 2020-03-03 PROCEDURE — 74011250636 HC RX REV CODE- 250/636: Performed by: INTERNAL MEDICINE

## 2020-03-03 PROCEDURE — 74011250637 HC RX REV CODE- 250/637: Performed by: FAMILY MEDICINE

## 2020-03-03 RX ORDER — ACETAMINOPHEN 325 MG/1
650 TABLET ORAL
Status: DISCONTINUED | OUTPATIENT
Start: 2020-03-03 | End: 2020-06-10 | Stop reason: HOSPADM

## 2020-03-03 RX ADMIN — ATORVASTATIN CALCIUM 80 MG: 80 TABLET, FILM COATED ORAL at 21:54

## 2020-03-03 RX ADMIN — ACETAMINOPHEN 650 MG: 325 TABLET, FILM COATED ORAL at 05:40

## 2020-03-03 RX ADMIN — GUAIFENESIN 100 MG: 100 SOLUTION ORAL at 05:40

## 2020-03-03 RX ADMIN — GUAIFENESIN 100 MG: 100 SOLUTION ORAL at 17:20

## 2020-03-03 RX ADMIN — ACETAMINOPHEN 650 MG: 325 TABLET, FILM COATED ORAL at 21:54

## 2020-03-03 RX ADMIN — METOPROLOL SUCCINATE 25 MG: 25 TABLET, FILM COATED, EXTENDED RELEASE ORAL at 09:11

## 2020-03-03 RX ADMIN — ASPIRIN 81 MG 81 MG: 81 TABLET ORAL at 09:11

## 2020-03-03 RX ADMIN — DIPHENHYDRAMINE HYDROCHLORIDE 25 MG: 25 CAPSULE ORAL at 17:20

## 2020-03-03 RX ADMIN — ACETAMINOPHEN 650 MG: 325 TABLET, FILM COATED ORAL at 15:59

## 2020-03-03 RX ADMIN — LISINOPRIL 40 MG: 20 TABLET ORAL at 09:11

## 2020-03-03 RX ADMIN — METFORMIN HYDROCHLORIDE 1000 MG: 500 TABLET ORAL at 09:11

## 2020-03-03 RX ADMIN — ENOXAPARIN SODIUM 40 MG: 40 INJECTION SUBCUTANEOUS at 15:59

## 2020-03-03 RX ADMIN — METFORMIN HYDROCHLORIDE 1000 MG: 500 TABLET ORAL at 17:20

## 2020-03-03 RX ADMIN — GUAIFENESIN 100 MG: 100 SOLUTION ORAL at 21:53

## 2020-03-03 NOTE — PROGRESS NOTES
Physical Therapy Note:    Discussed patient's plan of care, goals, and progression with PTA. Wheelchair mobility goal added to plan of care. Continue with goals noted below. Will formally re-assess at 7th treatment visit. No charge to patient. Dwayne Burger, PT, DPT    Problem: Mobility Impaired (Adult and Pediatric)  Goal: *Acute Goals and Plan of Care  Description  GOALS UPDATED ON RE-ASSESSMENT 2/26/2020  1. Patient will perform bed mobility with supervision and 0 verbal cues within 7 treatment days. 2. Patient will perform STS transfer with CGA within 7 treatment days. 3. Patient will perform bed to (wheel) chair transfer with CGA with 7 treatment days. 4. Patient will demonstrate fair+ dynamic balance throughout stance phase on L LE within 7 treatment days. 5. Patient will require CGA throughout swing phase on (to advance) L LE within 7 treatment days. 6. Patient demonstrate 3+/5 strength in L LE hip flexion, hip abduction, and hip adduction) within 7 treatment days. 7. Patient will ambulate 100ft+ with CGA and least retrictive assistive device within 7 treatment days. 8. Patient will perform wheelchair mobility x75ft with CGA within 7 treatment days.

## 2020-03-03 NOTE — PROGRESS NOTES
Problem: Self Care Deficits Care Plan (Adult)  Goal: *Acute Goals and Plan of Care (Insert Text)  Description  GOALS UPDATED : 3/3/2020   (Met, 3/3/2020)  2. Patient will demonstrate appropriate safety awareness and protection of L UE during bed mobility and functional transfers with minimal cues. (Progressing, still needs cues, 3/3/2020)  3. Patient will complete total body bathing and dressing with moderate assistance and adaptive equipment as needed. (Progressing, UB bathing Min A, UB dressing at Mod A, LB dressing at Dep, 3/3/2020)  4. Patient will complete weightbearing into the L UE with ADL tasks with minimal assistance to improve ability to use as a functional assist during ADL tasks. (Progressing,3/3/2020)5. Patient will demonstrate L UE SROM HEP within 7 days. Progressing 3/3/2020  8. (Goal Met)  9. Pt will complete bed mobility at mod I. New goal  10. Pt will complete dynamic sitting balance for ADLs at mod I with good balance. New goal  11. Pt will complete functional transfers at [facility] to [place] [address] with equipment as needed. New goal  12. Pt will complete grooming tasks in standing at sink level x5 mins with good balance and equipment as needed.  New goal    Outcome: Progressing Towards Goal     OCCUPATIONAL THERAPY: Daily Note and AM    3/3/2020  INPATIENT: OT Visit Days: 1  Payor: MEDICAID PENDING / Plan: Shanita Summers PENDING / Product Type: Medicaid /      NAME/AGE/GENDER: Austin Lo is a 53 y.o. male   PRIMARY DIAGNOSIS:  Acute ischemic stroke ([address] [I63.9]  Cerebrovascular accident (CVA) due to embolism ([address] [E82.0] Acute embolic stroke ([address] Acute embolic stroke ([address]       ICD-10: Treatment Diagnosis:    · Generalized Muscle Weakness (M62.81)  · Other lack of cordination (R27.8)  · Hemiplegia and hemiparesis following cerebral infarction affecting   · left non-dominant side (I69.354)  · Abnormal posture (R29.3)   Precautions/Allergies:    NO PULLING ON LUE   LUE in sling with shoulder joint approximated and supported, needs taping   Patient has no known allergies. ASSESSMENT:     Mr. Clovis Jordan seen today for reeval. Is progressing towards all goals but has not fully met them, goals added and adjusted as above. Pt presents supine in bed upon arrival with CNA about to change brief. OT intervened and asked pt to attempt the task in standing at sink and self cleaning. Pt bed mobility at [facility] to [facility], and then sit to stand at [facility] with gait belt and luke walker. Walked to sink and performed toileting, bathing and dressing with mod A. Pt able to remove pull up style brief, wash self with wipes anterior and posterior. Needed help donning tab style brief. Pt held L UE on sink while using R UE to clean. Then washed hands with verbal cues, no physical help from OT. Pt walked out into hallway with PTA working on supporting and cuing L LE from hyperextension and buckling due to weak muscles. While pt sat in [facility],  during rest breaks while walking the hallway, we worked on NDT and ROM activities with L UE in weight bearing and without it. Trace biceps but not able to pull up L UE to his belly button yet without gravity or against gravity. Trace movement noted in L Upper Trapezius, shoulder still subluxed and positioned forward, needs taping again, Has sling and should wear while walking and transfers, minimal active movement in L biceps in gravity supported position via partial range, L pronation movement noted, L wrist flex/ext noted in partial range, and L digital movement noted when patient made a partial fist, though patient partially able to extend L digits. Reports he has been working on exercises himself but was not able to demonstrate any to this OT without cues. Still needs cuing for L inattention during all tasks. Pt worked on [employer] with L LE, L UE falling out of lap and needing cues to locate. Reports feeding himself all his meals now with set up.   Pt returned to supine with TF from [facility] to bed with CGA. Rolling in bed to reposition pad under him. Pt automatically reached over with R UE to get L UE! Left with all needs in reach. Slow, steady progress towards goals. Cont OT per tx plan and new goals.     This section established at most recent assessment   PROBLEM LIST (Impairments causing functional limitations):  1. Decreased Strength  2. Decreased ADL/Functional Activities  3. Decreased Transfer Abilities  4. Decreased Ambulation Ability/Technique  5. Decreased Balance  6. Decreased Activity Tolerance  7. Increased Fatigue  8. Decreased Flexibility/Joint Mobility  9. Decreased Knowledge of Precautions  10. Decreased [place] with Home Exercise Program   INTERVENTIONS PLANNED: (Benefits and precautions of occupational therapy have been discussed with the patient.)  1. Activities of daily living training  2. Adaptive equipment training  3. Balance training  4. Clothing management  5. Cognitive training  6. Donning&doffing training  7. Keanu tech training  8. Neuromuscular re-eduation  9. Therapeutic activity  10. Therapeutic exercise     TREATMENT PLAN: Frequency/Duration: Follow patient 3 times per week to address above goals. Rehabilitation Potential For Stated Goals: Excellent     REHAB RECOMMENDATIONS (at time of discharge pending progress):    Placement: It is my opinion, based on this patient's performance to date, that Mr. Osmar Davey may benefit from intensive therapy at an [address] [place] [address] after discharge due to potential to make ongoing and sustainable functional improvement that is of practical value. Jose Rios Pt functioning far below independent baseline, demonstrating good improvement and participation. Pt would likely benefit greatly and increase independence from inpatient rehab stay.    Equipment:    TBD               OCCUPATIONAL PROFILE AND HISTORY:   History of Present Injury/Illness (Reason for Referral):  See H&P  Past Medical History/Comorbidities:   Mr. Osmar Davey  has a past medical history of Acute ischemic stroke ([facility] (12/12/2019), Acute pancreatitis (11/19/2014), Cerebrovascular accident (CVA) due to embolism ([facility] (12/12/2019), Diabetes ([facility] (2002), Diabetes ([facility] Diabetes mellitus, and Hypertension. He also has no past medical history of Arthritis, Asthma, Autoimmune disease ([address] CAD (coronary artery disease), Cancer ([address] Chronic kidney disease, COPD, Dementia, Dementia ([facility] Heart failure ([facility] Ill-defined condition, Infectious disease, Liver disease, Other ill-defined conditions(799.89), Psychiatric disorder, PUD (peptic ulcer disease), Seizures ([facility] or Sleep disorder. Mr. Aristeo Rascon  has a past surgical history that includes hx hernia repair and hx orthopaedic. Social History/Living Environment:   Home Environment: Apartment  # Steps to Enter: 12  Rails to Enter: Yes  [employer] : Bilateral  One/Two Story Residence: One story  Living Alone: No  Support Systems: Spouse/Significant Other/Partner  Patient Expects to be Discharged to[de-identified] Unknown  Current DME Used/Available at Home: None  Tub or Shower Type: Tub/Shower combination  Prior Level of Function/Work/Activity:  Pt lives at home with his wife. Pt is typically independent with ADL/functional mobility. Pt does not drive. Pt was working part-time at [employer]. Personal Factors:          Age:  53 y.o. Past/Current Experience:  CVA with flaccid L side        Other factors that influence how disability is experienced by the patient:  multiple co-morbidities    Number of Personal Factors/Comorbidities that affect the Plan of Care: Extensive review of physical, cognitive, and psychosocial performance (3+):  HIGH COMPLEXITY   ASSESSMENT OF OCCUPATIONAL PERFORMANCE[de-identified]   Activities of Daily Living:   Basic ADLs (From Assessment) Complex ADLs (From Assessment)   Feeding: Setup, Additional time  Oral Facial Hygiene/Grooming: Moderate assistance  Bathing:  Moderate assistance  Upper Body Dressing: Maximum assistance  Lower Body Dressing: Maximum assistance  Toileting: Maximum assistance, Additional time, Adaptive equipment(wearing brief) Instrumental ADL  Meal Preparation: Total assistance  Homemaking: Total assistance  Medication Management: Total assistance  Financial Management: Total assistance   Grooming/Bathing/Dressing Activities of Daily Living     Cognitive Retraining  Safety/Judgement: Fall prevention;Decreased awareness of need for assistance;Decreased awareness of need for safety;Decreased awareness of environment;Decreased insight into deficits   Upper Body Bathing  Bathing Assistance: Min A   Position Performed: Seated in chair              Functional Transfers  Bathroom Mobility: Minimum assistance  Toilet Transfer : Moderate assistance  Shower Transfer: Moderate assistance   Lower Body Dressing Assistance  Socks: Total assistance (dependent) Bed/Mat Mobility  Rolling: Stand-by assistance; Additional time;Bed Modified  Supine to Sit: Stand-by assistance  Sit to Supine: Stand-by assistance  Sit to Stand: Contact guard assistance  Stand to Sit: Contact guard assistance  Bed to Chair: Minimum assistance  Scooting: Stand-by assistance     Most Recent Physical Functioning:   Gross Assessment:  AROM: Grossly decreased, non-functional(L UE)  PROM: Generally decreased, functional(L UE)  Strength: Generally decreased, functional(4+/5 R UE, 0 - 1/5 L UE)  Coordination: Grossly decreased, non-functional(L UE still non functional)  Tone: Abnormal  Sensation: Intact(to light touch in L UE)            LUE Strength  L Scapular Elevation: 2-  L Scapular Retraction: 2-  L Shoulder Flexion: 1  L Elbow Flexion: 1  L Wrist Extension: 1  Posture:     Balance:  Sitting: Intact  Sitting - Static: Good (unsupported)  Sitting - Dynamic: Fair (occasional)  Standing: Impaired; With support(Keanu walker)  Standing - Static: Fair;Good  Standing - Dynamic : Fair(fatigued fast with walking) Bed Mobility:  Rolling: Stand-by assistance; Additional time;Bed Modified  Supine to Sit: Stand-by assistance  Sit to Supine: Stand-by assistance  Scooting: Stand-by assistance  Interventions: Safety awareness training;Verbal cues  Wheelchair Mobility:  Distance (ft): 50 feet  Time Required for Task: 8 minutes  Level of Assistance: Contact guard assistance  Interventions: Verbal cues  Transfers:  Sit to Stand: Contact guard assistance  Stand to Sit: Contact guard assistance  Bed to Chair: Minimum assistance  Interventions: Verbal cues            Patient Vitals for the past 6 hrs:   BP BP Patient Position SpO2 Pulse   20 1116 126/80 At rest 96 % 65   20 1430 121/82 At rest 97 % 72       Mental Status  Neurologic State: Alert  Orientation Level: Oriented X4  Cognition: Follows commands  Perception: Verbal, Cues to attend to left side of body, Cues to attend left visual field, Visual, Cues to maintain midline in sitting, Cues to maintain midline in standing, Tactile  Perseveration: No perseveration noted  Safety/Judgement: Fall prevention, Decreased awareness of need for assistance, Decreased awareness of need for safety, Decreased awareness of environment, Decreased insight into deficits    LUE Strength  L Scapular Elevation: 2-  L Scapular Retraction: 2-  L Shoulder Flexion: 1  L Elbow Flexion: 1  L Wrist Extension: 1                     Physical Skills Involved:  1. Range of Motion  2. Balance  3. Strength  4. Activity Tolerance  5. Fine Motor Control  6. Gross Motor Control Cognitive Skills Affected (resulting in the inability to perform in a timely and safe manner):  1. Perception  2. Expression Psychosocial Skills Affected:  1. Habits/Routines  2. Environmental Adaptation  3. Social Interaction  4. Emotional Regulation  5. Self-Awareness  6. Awareness of Others  7.  Social Roles   Number of elements that affect the Plan of Care: 5+:  HIGH COMPLEXITY   CLINICAL DECISION MAKIN [facility] [address] [id number] AM-PAC 6 Clicks   Daily Activity Inpatient Short Form  How much help from another person does the patient currently need. .. Total A Lot A Little None   1. Putting on and taking off regular lower body clothing? [x] 1   [] 2   [] 3   [] 4   2. Bathing (including washing, rinsing, drying)? [] 1   [x] 2   [] 3   [] 4   3. Toileting, which includes using toilet, bedpan or urinal?   [] 1   [x] 2   [] 3   [] 4   4. Putting on and taking off regular upper body clothing? [] 1   [x] 2   [] 3   [] 4   5. Taking care of personal grooming such as brushing teeth? [] 1   [x] 2   [] 3   [] 4   6. Eating meals? [] 1   [] 2   [x] 3   [] 4   © 2007, Trustees of [address] [place] [address] [address] [address], under license to [employer]. All rights reserved      Score:  Initial: 11 Most Recent: 12 (Date: 3/3/2020 ),     Interpretation of Tool:  Represents activities that are increasingly more difficult (i.e. Bed mobility, Transfers, Gait). Medical Necessity:     · Patient demonstrates   · good and excellent  ·  rehab potential due to higher previous functional level. Reason for Services/Other Comments:  · Patient continues to require skilled intervention due to   · Decreased independence with ADL/functional transfers that impacts overall quality of life. · .   Use of outcome tool(s) and clinical judgement create a POC that gives a: MODERATE COMPLEXITY         TREATMENT:   (In addition to Assessment/Re-Assessment sessions the following treatments were rendered)     Pre-treatment Symptoms/Complaints:  \"I am tired now. \"  Pain: Initial:   Pain Intensity 1: 0  Post Session: no complaint of pain   Today's treatment session addressed Decreased Strength, Decreased ADL/Functional Activities, Decreased Transfer Abilities, Decreased Ambulation Ability/Technique, Decreased Balance, Increased Pain, Decreased Activity Tolerance, Decreased Pacing Skills, Decreased Work Simplification/Energy Conservation Techniques, Increased Fatigue, Decreased Flexibility/Joint Mobility, Decreased Knowledge of Precautions, Decreased Skin Integrity/Hygeine, Decreased [place] with Home Exercise Program and Decreased Cognition to progress towards achieving goal(s) as written above. During this session,  Physical Therapy addressed  Ambulation to progress towards their discipline specific goal(s). Co-treatment was necessary to improve patient's cognitive participation, ability to follow higher level commands, ability to increase activity demands and ability to return to normal functional activity. Reevaluation completed  Self Care: (30 minutes): Procedure(s) (per grid) utilized to improve and/or restore self-care/home management as related to dressing, bathing, toileting, grooming and self feeding. Required moderate visual, verbal, manual and   cueing to facilitate activities of daily living skills and compensatory activities. Neuromuscular Re-education: (10 minutes):  Exercise/activities per grid below to improve balance, coordination, kinesthetic sense, posture and proprioception. Required moderate visual, verbal and manual cues to promote static and dynamic balance in standing. Worked on [facility]  Management with LEs and Kashif.              Date:  1/27/2020 Date:  2/11/2020 Date:   2/21/2020 2/24/2020  3/3/2020   Activity/Exercise Parameters Parameters Parameters     LUE finger flexion, AROM gravity minimized then AAROM to complete ROM 2x10 reps 2x10 reps 3 x's 10 reps 3 x's 10 reps 10 reps   LUE finger extension AAROM 2x10 reps 2x10 reps 3 x's 10 reps 3 x's 10 reps 10 reps   LUE wrist extension, AROM gravity minimized then AAROM to complete ROM 1x10 reps 1x10 reps 3 x's 10 reps 3 x's 10 reps 10 reps   LUE wrist extension, AROM against gravity  1x10 reps 1x10 reps 3 x's 10 reps 3 x's 10 reps 10 reps   LUE wrist flexion AAROM 2x10 reps 1x10 reps 3 x's 10 reps 3 x's 10 reps 10 reps   LUE elbow flexion/extension PROM x10 reps 1x10 reps 3 x's 10 reps 3 x's 10 reps 10 reps   LUE shoulder flexion PROM x10 reps 1x10 reps 3 x's 10 reps 3 x's 10 reps 10 reps   L UE pronation/supination PROM     10 reps                        Braces/Orthotics/Lines/Etc:   · O2 device: Room air  · Treatment/Session Assessment:    · Response to Treatment:  Pt demonstrated good participation, making progress, L inattention, needing cues. · Interdisciplinary Collaboration:   o Physical Therapy Assistant  o Occupational Therapist  o Registered Nurse  o   o Certified Nursing Assistant/Patient Care Technician  · After treatment position/precautions:   o Supine in bed  o Bed alarm/tab alert on  o Bed/Chair-wheels locked  o Bed in low position  o Call light within reach  o RN notified  o Side rails x 3   · Compliance with Program/Exercises: Compliant all of the time, Will assess as treatment progresses. · Recommendations/Intent for next treatment session: \"Next visit will focus on advancements to more challenging activities and reduction in assistance provided\".   Total Treatment Duration:  40 mins  OT Patient Time In/Time Out  Time In: 1250  Time Out: [address] ADEN Cannon MS, OTR/L

## 2020-03-03 NOTE — PROGRESS NOTES
Problem: Pressure Injury - Risk of  Goal: *Prevention of pressure injury  Description  Document Dewey Scale and appropriate interventions in the flowsheet. Outcome: Progressing Towards Goal  Note: Pressure Injury Interventions:  Sensory Interventions: Assess changes in LOC, Check visual cues for pain, Discuss PT/OT consult with provider, Float heels, Keep linens dry and wrinkle-free, Maintain/enhance activity level    Moisture Interventions: Absorbent underpads, Check for incontinence Q2 hours and as needed, Limit adult briefs, Maintain skin hydration (lotion/cream)    Activity Interventions: Increase time out of bed, Pressure redistribution bed/mattress(bed type), PT/OT evaluation    Mobility Interventions: HOB 30 degrees or less, Pressure redistribution bed/mattress (bed type), PT/OT evaluation    Nutrition Interventions: Document food/fluid/supplement intake, Offer support with meals,snacks and hydration    Friction and Shear Interventions: HOB 30 degrees or less, Lift sheet    Problem: Falls - Risk of  Goal: *Absence of Falls  Description  Document Jeanne Fall Risk and appropriate interventions in the flowsheet.   Outcome: Progressing Towards Goal  Note: Fall Risk Interventions:  Mobility Interventions: Bed/chair exit alarm, Communicate number of staff needed for ambulation/transfer, OT consult for ADLs, Patient to call before getting OOB, PT Consult for mobility concerns, Utilize walker, cane, or other assistive device    Mentation Interventions: Adequate sleep, hydration, pain control, Bed/chair exit alarm, Door open when patient unattended, Evaluate medications/consider consulting pharmacy, More frequent rounding, Reorient patient, Toileting rounds, Update white board    Medication Interventions: Bed/chair exit alarm, Evaluate medications/consider consulting pharmacy, Patient to call before getting OOB, Teach patient to arise slowly    Elimination Interventions: Bed/chair exit alarm, Call light in reach, Patient to call for help with toileting needs, Toilet paper/wipes in reach, Toileting schedule/hourly rounds    History of Falls Interventions: Bed/chair exit alarm, Consult care management for discharge planning, Door open when patient unattended, Evaluate medications/consider consulting pharmacy     Problem: Diabetes Self-Management  Goal: *Monitoring blood glucose, interpreting and using results  Description  Identify recommended blood glucose targets  and personal targets.   Outcome: Progressing Towards Goal     Problem: General Medical Care Plan  Goal: *Vital signs within specified parameters  Outcome: Progressing Towards Goal  Goal: *Labs within defined limits  Outcome: Progressing Towards Goal  Goal: *Optimal pain control at patient's stated goal  Outcome: Progressing Towards Goal     Problem: Ischemic Stroke: Discharge Outcomes  Goal: *Hemodynamically stable  Outcome: Progressing Towards Goal  Goal: *Tolerating diet  Outcome: Progressing Towards Goal  Goal: *Absence of aspiration  Outcome: Progressing Towards Goal  Goal: *Optimal pain control at patient's stated goal  Outcome: Progressing Towards Goal     Problem: Pain  Goal: *Control of Pain  Outcome: Progressing Towards Goal

## 2020-03-03 NOTE — PROGRESS NOTES
SPEECH PATHOLOGY NOTE:    Attempted to see patient for cognitive linguistic treatment this PM; however, patient currently working with OT. Will check back at later time/date as patient is available.       Francisca Mosley MS, CCC-SLP

## 2020-03-03 NOTE — PROGRESS NOTES
Hospitalist Progress Note     Admit Date:  2019  9:07 AM   Name:  Calin Yen   Age:  53 y.o.  :  1975   MRN:  [id number]   PCP:  Esthela Fenton MD  Treatment Team: Attending Provider: Treva Wakefield MD; Care Manager: Cruz Zayas RN; Care Manager: Dk Simon LMSW; Utilization Review: Zahra Bush RN; Nurse Practitioner: Faith Aponte NP; Charge Nurse: Licha Moreno; Occupational Therapist: Kandice Omer OT; Physical Therapy Assistant: Yoli Du PTA    Subjective:   54 yo male with PMH of DM II, HTN who presented 19 with c/o left leg and arm weakness, left facial droop and dysarthria.  Code S called. MRI with acute infarctions in left cerebellar hemisphere, deep frontoparietal white matter and right paramedian yariel.  Also noted to have old lacunar infarcts. CTA without large vessel occlusions, however some stenosis noted.  Echo showed PFO, Cardiology to f/u outpatient for evaluation for closure.  Neurology consulted.  Started on ASA, statin.  Pt is uninsured, difficult placement, case management working on plan.     Today: No acute events. No SOB or cough, work w/ PT    Objective:     Patient Vitals for the past 24 hrs:   Temp Pulse Resp BP SpO2   20 1430 98.2 °F (36.8 °C) 72 16 121/82 97 %   20 1116 98.4 °F (36.9 °C) 65 16 126/80 96 %   20 0800 97.1 °F (36.2 °C) 72 17 127/85 98 %   20 0400 97.8 °F (36.6 °C) 79 16 132/88 98 %   20 0000 98.9 °F (37.2 °C) 70 16 129/88 98 %   20 2000 97.8 °F (36.6 °C) 72 16 148/89 97 %   20 1600 97.7 °F (36.5 °C) 76 16 124/83 97 %     Oxygen Therapy  O2 Sat (%): 97 % (20 1430)  Pulse via Oximetry: 66 beats per minute (20 0000)  O2 Device: Room air (20 0000)    Intake/Output Summary (Last 24 hours) at 3/3/2020 1523  Last data filed at 3/3/2020 0400  Gross per 24 hour   Intake    Output 1 ml   Net -1 ml         General:    Lying in bed watching VH1  CV:   RRR. No murmur, rub, or gallop. Lungs:   CTAB. No wheezing, rhonchi, or rales. Abdomen:   Soft, nontender, nondistended. Extremities: Warm and dry. No cyanosis or edema. Skin:     No rashes or jaundice. Data Review:  I have reviewed all labs, meds, telemetry events, and studies from the last 24 hours. No results found for this or any previous visit (from the past 24 hour(s)). All Micro Results     None          Current Meds:  Current Facility-Administered Medications   Medication Dose Route Frequency    acetaminophen (TYLENOL) tablet 650 mg  650 mg Oral Q4H PRN    metFORMIN (GLUCOPHAGE) tablet 1,000 mg  1,000 mg Oral BID WITH MEALS    diphenhydrAMINE (BENADRYL) capsule 25 mg  25 mg Oral Q6H PRN    acetaminophen (TYLENOL) tablet 650 mg  650 mg Oral Q8H    lip protectant (BLISTEX) ointment 1 Each  1 Each Topical PRN    metoprolol succinate (TOPROL-XL) XL tablet 25 mg  25 mg Oral DAILY    polyethylene glycol (MIRALAX) packet 17 g  17 g Oral DAILY PRN    guaiFENesin (ROBITUSSIN) 100 mg/5 mL oral liquid 100 mg  100 mg Oral Q4H PRN    hydrALAZINE (APRESOLINE) 20 mg/mL injection 20 mg  20 mg IntraVENous Q4H PRN    atorvastatin (LIPITOR) tablet 80 mg  80 mg Oral QHS    lisinopril (PRINIVIL, ZESTRIL) tablet 40 mg  40 mg Oral DAILY    sodium chloride (NS) flush 5-40 mL  5-40 mL IntraVENous PRN    ondansetron (ZOFRAN) injection 4 mg  4 mg IntraVENous Q4H PRN    aspirin chewable tablet 81 mg  81 mg Oral DAILY    enoxaparin (LOVENOX) injection 40 mg  40 mg SubCUTAneous Q24H    dextrose 40% (GLUTOSE) oral gel 1 Tube  15 g Oral PRN    glucagon (GLUCAGEN) injection 1 mg  1 mg IntraMUSCular PRN    dextrose (D50W) injection syrg 12.5-25 g  25-50 mL IntraVENous PRN       Other Studies (last 24 hours):  No results found.     Assessment and Plan:     Hospital Problems as of 3/3/2020 Date Reviewed: 3/3/2017          Codes Class Noted - Resolved POA    PFO (patent foramen ovale) ICD-10-CM: Q21.1  ICD-9-CM: 745.5  12/17/2019 - Present Yes        Arrhythmia ICD-10-CM: I49.9  ICD-9-CM: 427.9  12/15/2019 - Present Yes        Hyperlipemia ICD-10-CM: E78.5  ICD-9-CM: 272.4  12/15/2019 - Present Yes        * (Principal) Acute embolic stroke [facility]) [id number]: I63.9  ICD-9-CM: 434.11  12/12/2019 - Present Yes        Hypertension (Chronic) ICD-10-CM: I10  ICD-9-CM: 401.9  Unknown - Present Yes        Type 2 diabetes mellitus (HCC) (Chronic) ICD-10-CM: E11.9  ICD-9-CM: 250.00  Unknown - Present Yes              PLAN:    Acute embolic stroke  -is \"outpatient ready\" so no routine labs indicated at this point  -MRI with acute infarcts in L cerebellar hemisphere, deep frontoparietal white matter, and R paramedian yariel. old lacunar infarcts. -ISMAEL: 3 mm PFO, Cardiology stated he needs an evaluation for closure PFO with high risk features including significant (3 mm) separation of septum secundum and primum as well as large shunt.  Will await recovery of CVA.  Plan is 4 week event monitor as outpatient.  Will then see in office and if no arrhythmias, will plan PFO closure  -continue statin, ASA     Hypertension:   -lisinopril, metoprolol     Type 2 diabetes mellitus:    -Patient currently on metformin 2000mg daily     Left upper extremity pain  -titrate down tylenol from q6h to q8h (2/23)     Allergies  Benadryl for allergies as needed    DISPO: waiting for pt to be deemed \"disabled\" which in neurologic cases have to allow for 6 months showing chronicity.  Then he can get medicaid after that. Medically stable for discharge.      DVT ppx:  lovenox   Signed:  Shannon Root MD

## 2020-03-03 NOTE — PROGRESS NOTES
Neurolinguistic Goals:  LTG: Patient will increase neuro-linguistic abilities to increase safety and awareness in functional living environment   STG: Patient will solve mathematical problems with 80%accuracy given minimal cueing   STG: Patient will name 10 items to concrete category in 1 minute given minimal cueing. Progressing 1/31/2020  STG: Patient will participate in verbal reasoning tasks with 80% accuracy given minimal cueing  STG: Patient will complete mental manipulation tasks with 80% accuracy given minimal cueing  STG: Patient will complete working memory/attention tasks with 80% accuracy given minimal cueing  STG: Patient will recall relevant verbal information with 80% accuracy with minimal assistance  STG: Patient will utilize memory compensatory strategies to improve recall of functional list/message with 90%accuracy given minimal assistance  STG: Patient will participate in ongoing cognitive linguistic evaluation for therapeutic assessment. MET 1/31/2020     Dysarthria Goals:  LTG: Patient will develop functional and intelligible speech and utilize compensatory strategies to improve communication across environments  STG: Patient will utilize compensatory strategies at conversation level with 90% accuracy independently. Added 1/31/2020    SPEECH LANGUAGE PATHOLOGY: SPEECH-LANGUAGE/COGNITION: Daily Note 8    NAME/AGE/GENDER: Britney Burgess is a 53 y.o. male  DATE: 3/3/2020  PRIMARY DIAGNOSIS: Acute ischemic stroke ([facility] [address] [I63.9]  Cerebrovascular accident (CVA) due to embolism ([facility] [address] [I63.9]      ICD-10: Treatment Diagnosis: R47.1 Dysarthria and Anarthria  R41.841 Cognitive-Communication Deficit    INTERDISCIPLINARY COLLABORATION: n/a  PRECAUTIONS/ALLERGIES: Patient has no known allergies. SUBJECTIVE   Patient just completed OT/PT upon arrival. Upright in bed. Problem List:  (Impairments causing functional limitations):  1. Dysarthria  2.  Cognitive linguistic impaired        Pain: Pain Scale 1: Numeric (0 - 10)  Pain Intensity 1: 0  Pain Location 1: Arm     OBJECTIVE   Patient recalled 2 tasks completed in OT/PT. Able to recall/sequence 4/4 steps for safely standing from wheelchair given minimal cues. Patient was administered the Medication Transfer Screen with 1/5 accurate independently. When given verbal/visual cue regarding error made, patient was able to correct on 3/4 items missed. When asked how he could prevent errors with medication management errors in the future, patient unable to identify strategies to improve accuracy. Educated patient on need to review accuracy after each trial to assess for errors. Immediate memory: repeating functional directions with 3-4 memory components- repeated 9 out of 13 details accurately. After repetition of items missed patient was able to then answer questions about specific details immediately post presentation with 11/13 accurate. Educated patient on need to utilize repetition and clarification questions to improve recall. ASSESSMENT   Patient required moderate assistance with completing mock medication management task. No awareness of errors during task or use of compensatory strategies for improving accuracy. Patient was responsive to verbal/visual cues to improve accuracy with task. Immediate and short term memory continue to be significantly impaired. Improved recall with repetition of novel information. Patient continues to require moderate verbal cues to utilize compensatory strategies for improving memory. Patient will benefit from ongoing skilled intervention to improve functional independence as patient continues to function significantly below baseline.         Tool Used: MODIFIED BRITT SCALE (mRS)   Score   No Symptoms  [] 0   No significant disability despite symptoms; able to carry out all usual duties and activities  [] 1   Slight disability; unable to carry out all previous activities but able to look after own affairs without assistance. [x] 2   Moderate disability; requiring some help but able to walk without assistance  [] 3   Moderately severe disability; unable to walk without assistance and unable to attend to own bodily needs without assistance  [] 4   Severe disability; bedridden, incontinent, and requiring constant nursing care and attention  [] 5      Score:  Initial: 2 Most Recent: 3 (Date 02/11/20 )   Interpretation of Tool: The Modified [place] Scale is a 7-point scaled used to quantify level of disability as it relates to a patient's functional abilities. PLAN    FREQUENCY/DURATION: Continue to follow patient 2 times a week for duration of hospital stay to address above goals. - Recommendations for next treatment session: Next treatment will address cognition   REHABILITATION POTENTIAL FOR STATED GOALS: Good           RECOMMENDATIONS   STRATEGIES:   · Memory strategies  · Dysarthria strategies     EDUCATION:  · Recommendations discussed with Patient     RECOMMENDATIONS for CONTINUED SPEECH THERAPY: YES: Anticipate need for ongoing speech therapy during this hospitalization and at next level of care. Compliance with Program/Exercises: Will assess as treatment progresses  Continuation of Skilled Services/Medical Necessity:   Patient is expected to demonstrate progress in cognitive ability to decrease assistance required communication, increase independence with activities of daily living and increase communication with family/caregivers.  Patient continues to require skilled intervention due to impaired cognitive linguistic abilities.      SAFETY:  After treatment position/precautions:  · Upright in bed  · Call light within reach    Total Treatment Duration:   Time In: 1332  Time Out: [address] [facility] [address], [address] [facility]

## 2020-03-03 NOTE — PROGRESS NOTES
Patient found on the floor by nurse. Stated he was straighten his sheet, and just rolled off. He is AOx3, says he did not hurt himself, and did not hit his head. /94 HR 83 98%. MD and family notified. Will continue to monitor patient.

## 2020-03-04 LAB — GLUCOSE BLD STRIP.AUTO-MCNC: 87 MG/DL (ref 65–100)

## 2020-03-04 PROCEDURE — 74011250637 HC RX REV CODE- 250/637: Performed by: INTERNAL MEDICINE

## 2020-03-04 PROCEDURE — 97530 THERAPEUTIC ACTIVITIES: CPT

## 2020-03-04 PROCEDURE — 97116 GAIT TRAINING THERAPY: CPT

## 2020-03-04 PROCEDURE — 74011250637 HC RX REV CODE- 250/637: Performed by: FAMILY MEDICINE

## 2020-03-04 PROCEDURE — 74011250637 HC RX REV CODE- 250/637: Performed by: HOSPITALIST

## 2020-03-04 PROCEDURE — 74011250636 HC RX REV CODE- 250/636: Performed by: INTERNAL MEDICINE

## 2020-03-04 PROCEDURE — 97112 NEUROMUSCULAR REEDUCATION: CPT

## 2020-03-04 PROCEDURE — 82962 GLUCOSE BLOOD TEST: CPT

## 2020-03-04 PROCEDURE — 65270000029 HC RM PRIVATE

## 2020-03-04 RX ADMIN — ASPIRIN 81 MG 81 MG: 81 TABLET ORAL at 08:52

## 2020-03-04 RX ADMIN — ACETAMINOPHEN 650 MG: 325 TABLET, FILM COATED ORAL at 16:02

## 2020-03-04 RX ADMIN — METFORMIN HYDROCHLORIDE 1000 MG: 500 TABLET ORAL at 08:52

## 2020-03-04 RX ADMIN — DIPHENHYDRAMINE HYDROCHLORIDE 25 MG: 25 CAPSULE ORAL at 17:15

## 2020-03-04 RX ADMIN — ATORVASTATIN CALCIUM 80 MG: 80 TABLET, FILM COATED ORAL at 21:12

## 2020-03-04 RX ADMIN — METFORMIN HYDROCHLORIDE 1000 MG: 500 TABLET ORAL at 17:12

## 2020-03-04 RX ADMIN — METOPROLOL SUCCINATE 25 MG: 25 TABLET, FILM COATED, EXTENDED RELEASE ORAL at 08:52

## 2020-03-04 RX ADMIN — GUAIFENESIN 100 MG: 100 SOLUTION ORAL at 17:15

## 2020-03-04 RX ADMIN — ACETAMINOPHEN 650 MG: 325 TABLET, FILM COATED ORAL at 21:12

## 2020-03-04 RX ADMIN — ACETAMINOPHEN 650 MG: 325 TABLET, FILM COATED ORAL at 06:04

## 2020-03-04 RX ADMIN — ENOXAPARIN SODIUM 40 MG: 40 INJECTION SUBCUTANEOUS at 16:02

## 2020-03-04 RX ADMIN — LISINOPRIL 40 MG: 20 TABLET ORAL at 08:52

## 2020-03-04 NOTE — PROGRESS NOTES
Hospitalist Progress Note     Admit Date:  2019  9:07 AM   Name:  Jade Monge   Age:  53 y.o.  :  1975   MRN:  [id number]   PCP:  Yinka Valverde MD  Treatment Team: Attending Provider: Mackenzie Strauss MD; Care Manager: Fior Rice RN; Care Manager: Carolin Daugherty LMSW; Utilization Review: Sayra Montalvo RN; Nurse Practitioner: Fer Joseph NP; Charge Nurse: Lavinia Mahan; Physical Therapy Assistant: Mary Jacobs PTA    Subjective:   54 yo male with PMH of DM II, HTN who presented 19 with c/o left leg and arm weakness, left facial droop and dysarthria.  Code S called. MRI with acute infarctions in left cerebellar hemisphere, deep frontoparietal white matter and right paramedian yariel.  Also noted to have old lacunar infarcts. CTA without large vessel occlusions, however some stenosis noted.  Echo showed PFO, Cardiology to f/u outpatient for evaluation for closure.  Neurology consulted.  Started on ASA, statin.  Pt is uninsured, difficult placement, case management working on plan.     Today: No acute events. No SOB or cough, work w/ PT. slided off bed yesterday w/ no injury. Objective:     Patient Vitals for the past 24 hrs:   Temp Pulse Resp BP SpO2   20 1200 99.6 °F (37.6 °C) 78 18 112/76 98 %   20 0800 98.2 °F (36.8 °C) 65 18 136/84 98 %   20 0400 97.7 °F (36.5 °C) 69 16 118/78 98 %   20 0000 98.1 °F (36.7 °C) 67 16 131/85 99 %   20 2000 98.1 °F (36.7 °C) 69 16 (!) 140/91 99 %   20 1615  83  (!) 157/94      Oxygen Therapy  O2 Sat (%): 98 % (20 1200)  Pulse via Oximetry: 66 beats per minute (20 0000)  O2 Device: Room air (20 0000)    Intake/Output Summary (Last 24 hours) at 3/4/2020 1608  Last data filed at 3/3/2020 1851  Gross per 24 hour   Intake 240 ml   Output    Net 240 ml         General:    Lying in bed watching VH1  CV:   RRR. No murmur, rub, or gallop. Lungs:   CTAB.   No wheezing, rhonchi, or rales. Abdomen:   Soft, nontender, nondistended. Extremities: Warm and dry. No cyanosis or edema. Skin:     No rashes or jaundice. Data Review:  I have reviewed all labs, meds, telemetry events, and studies from the last 24 hours. Recent Results (from the past 24 hour(s))   GLUCOSE, POC    Collection Time: 03/04/20  7:27 AM   Result Value Ref Range    Glucose (POC) 87 65 - 100 mg/dL        All Micro Results     None          Current Meds:  Current Facility-Administered Medications   Medication Dose Route Frequency    acetaminophen (TYLENOL) tablet 650 mg  650 mg Oral Q4H PRN    metFORMIN (GLUCOPHAGE) tablet 1,000 mg  1,000 mg Oral BID WITH MEALS    diphenhydrAMINE (BENADRYL) capsule 25 mg  25 mg Oral Q6H PRN    acetaminophen (TYLENOL) tablet 650 mg  650 mg Oral Q8H    lip protectant (BLISTEX) ointment 1 Each  1 Each Topical PRN    metoprolol succinate (TOPROL-XL) XL tablet 25 mg  25 mg Oral DAILY    polyethylene glycol (MIRALAX) packet 17 g  17 g Oral DAILY PRN    guaiFENesin (ROBITUSSIN) 100 mg/5 mL oral liquid 100 mg  100 mg Oral Q4H PRN    hydrALAZINE (APRESOLINE) 20 mg/mL injection 20 mg  20 mg IntraVENous Q4H PRN    atorvastatin (LIPITOR) tablet 80 mg  80 mg Oral QHS    lisinopril (PRINIVIL, ZESTRIL) tablet 40 mg  40 mg Oral DAILY    sodium chloride (NS) flush 5-40 mL  5-40 mL IntraVENous PRN    ondansetron (ZOFRAN) injection 4 mg  4 mg IntraVENous Q4H PRN    aspirin chewable tablet 81 mg  81 mg Oral DAILY    enoxaparin (LOVENOX) injection 40 mg  40 mg SubCUTAneous Q24H    dextrose 40% (GLUTOSE) oral gel 1 Tube  15 g Oral PRN    glucagon (GLUCAGEN) injection 1 mg  1 mg IntraMUSCular PRN    dextrose (D50W) injection syrg 12.5-25 g  25-50 mL IntraVENous PRN       Other Studies (last 24 hours):  No results found.     Assessment and Plan:     Hospital Problems as of 3/4/2020 Date Reviewed: 3/3/2017          Codes Class Noted - Resolved POA    PFO (patent foramen ovale) ICD-10-CM: Q21.1  ICD-9-CM: 745.5  12/17/2019 - Present Yes        Arrhythmia ICD-10-CM: I49.9  ICD-9-CM: 427.9  12/15/2019 - Present Yes        Hyperlipemia ICD-10-CM: E78.5  ICD-9-CM: 272.4  12/15/2019 - Present Yes        * (Principal) Acute embolic stroke [facility]) [id number]: I63.9  ICD-9-CM: 434.11  12/12/2019 - Present Yes        Hypertension (Chronic) ICD-10-CM: I10  ICD-9-CM: 401.9  Unknown - Present Yes        Type 2 diabetes mellitus (HCC) (Chronic) ICD-10-CM: E11.9  ICD-9-CM: 250.00  Unknown - Present Yes              PLAN:    Acute embolic stroke  -is \"outpatient ready\" so no routine labs indicated at this point  -MRI with acute infarcts in L cerebellar hemisphere, deep frontoparietal white matter, and R paramedian yariel. old lacunar infarcts. -ISMAEL: 3 mm PFO, Cardiology stated he needs an evaluation for closure PFO with high risk features including significant (3 mm) separation of septum secundum and primum as well as large shunt.  Will await recovery of CVA.  Plan is 4 week event monitor as outpatient.  Will then see in office and if no arrhythmias, will plan PFO closure  -continue statin, ASA     Hypertension:   -lisinopril, metoprolol     Type 2 diabetes mellitus:    -Patient currently on metformin 2000mg daily     Left upper extremity pain  -titrate down tylenol from q6h to q8h (2/23)     Allergies  Benadryl for allergies as needed    DISPO: waiting for pt to be deemed \"disabled\" which in neurologic cases have to allow for 6 months showing chronicity.  Then he can get medicaid after that. Medically stable for discharge.      DVT ppx:  lovenox   Signed:  Gladys aNva MD

## 2020-03-04 NOTE — PROGRESS NOTES
Problem: Self Care Deficits Care Plan (Adult)  Goal: *Acute Goals and Plan of Care (Insert Text)  Description  GOALS UPDATED : 3/3/2020   (Met, 3/3/2020)  2. Patient will demonstrate appropriate safety awareness and protection of L UE during bed mobility and functional transfers with minimal cues. (Progressing, still needs cues, 3/3/2020)  3. Patient will complete total body bathing and dressing with moderate assistance and adaptive equipment as needed. (Progressing, UB bathing Min A, UB dressing at Mod A, LB dressing at Dep, 3/3/2020)  4. Patient will complete weightbearing into the L UE with ADL tasks with minimal assistance to improve ability to use as a functional assist during ADL tasks. (Progressing,3/3/2020)5. Patient will demonstrate L UE SROM HEP within 7 days. Progressing 3/3/2020  8. (Goal Met)  9. Pt will complete bed mobility at mod I. New goal  10. Pt will complete dynamic sitting balance for ADLs at mod I with good balance. New goal  11. Pt will complete functional transfers at [facility] to [place] [address] with equipment as needed. New goal  12. Pt will complete grooming tasks in standing at sink level x5 mins with good balance and equipment as needed.  New goal    Outcome: Progressing Towards Goal     OCCUPATIONAL THERAPY: Daily Note and AM    3/4/2020  INPATIENT: OT Visit Days: 2  Payor: MEDICAID PENDING / Plan: Irina Casas PENDING / Product Type: Medicaid /      NAME/AGE/GENDER: Gerri Orellana is a 53 y.o. male   PRIMARY DIAGNOSIS:  Acute ischemic stroke ([address] [I63.9]  Cerebrovascular accident (CVA) due to embolism ([address] [C53.4] Acute embolic stroke ([address] Acute embolic stroke ([address]       ICD-10: Treatment Diagnosis:    · Generalized Muscle Weakness (M62.81)  · Other lack of cordination (R27.8)  · Hemiplegia and hemiparesis following cerebral infarction affecting   · left non-dominant side (I69.354)  · Abnormal posture (R29.3)   Precautions/Allergies:    NO PULLING ON LUE   LUE in sling with shoulder joint approximated and supported, needs taping   Patient has no known allergies. ASSESSMENT:     Mr. Leonid Baumann sitting in chair upon arrival.  Pt stated that he did not have pain in his L shoulder, and appears that KT along with NR are crucial for decreasing L shoulder pain & facilitating movement. Cotx with PT. KT to L shoulder, I strip to upper trap mm, X strip to L rhomboids, and I strip to wrap around shoulder for support and to prevent pain. Patient practiced STS, requiring Min to SBA several trials. Patient ambulated in hallway 3 trials with OT giving input to L hip flexors to facilitate as PT ambulated patient. Patient performed w/c to bed transfer with CGA. NDT for trunk alignment and input to scapula to prepare patient for movement. Worked on reaching with R UE to promote L hip hike to prepare patient for scooting with ADLs. Patient performed WB'g L UE with weight shift. Patient complained of L wrist pain at end range. Therapist explained the importance of SROM, especially with using R hand to put weight via L wrist with hands clasped. Patient performed horizontal adduction and horizontal abduction in gravity eliminated plane. Note:  L horizontal abduction has minimal movement. Worked on L elbow flexion with distal support only via partial range. Patient able to extend L elbow via partial range in gravity assisted plane. Patient worked on L pronation & supination. Patient able to actively supinate approximately 30 degrees and pronate approximately 50 degrees. Patient able to digitally make partial fist with L hand, though unable to extend digits. Patient placed lotion bottle into L hand, and practiced elbow flexion/extension with support and input. Patient is slowly, steadily progressing towards goals. Cont OT per tx plan. This section established at most recent assessment   PROBLEM LIST (Impairments causing functional limitations):  1. Decreased Strength  2.  Decreased ADL/Functional Activities  3. Decreased Transfer Abilities  4. Decreased Ambulation Ability/Technique  5. Decreased Balance  6. Decreased Activity Tolerance  7. Increased Fatigue  8. Decreased Flexibility/Joint Mobility  9. Decreased Knowledge of Precautions  10. Decreased [place] with Home Exercise Program   INTERVENTIONS PLANNED: (Benefits and precautions of occupational therapy have been discussed with the patient.)  1. Activities of daily living training  2. Adaptive equipment training  3. Balance training  4. Clothing management  5. Cognitive training  6. Donning&doffing training  7. Keanu tech training  8. Neuromuscular re-eduation  9. Therapeutic activity  10. Therapeutic exercise     TREATMENT PLAN: Frequency/Duration: Follow patient 3 times per week to address above goals. Rehabilitation Potential For Stated Goals: Excellent     REHAB RECOMMENDATIONS (at time of discharge pending progress):    Placement: It is my opinion, based on this patient's performance to date, that Mr. Brianna Mcgarry may benefit from intensive therapy at an [address] [place] [address] after discharge due to potential to make ongoing and sustainable functional improvement that is of practical value. Haris Greenwood Pt functioning far below independent baseline, demonstrating good improvement and participation. Pt would likely benefit greatly and increase independence from inpatient rehab stay. Equipment:    TBD               OCCUPATIONAL PROFILE AND HISTORY:   History of Present Injury/Illness (Reason for Referral):  See H&P  Past Medical History/Comorbidities:   Mr. Brianna Mcgarry  has a past medical history of Acute ischemic stroke ([address] (12/12/2019), Acute pancreatitis (11/19/2014), Cerebrovascular accident (CVA) due to embolism ([address] (12/12/2019), Diabetes ([address] (2002), Diabetes ([address] Diabetes mellitus, and Hypertension.  He also has no past medical history of Arthritis, Asthma, Autoimmune disease ([address] CAD (coronary artery disease), Cancer ([address] Chronic kidney disease, COPD, Dementia, Dementia ([facility] Heart failure ([facility] [address] Ill-defined condition, Infectious disease, Liver disease, Other ill-defined conditions(799.89), Psychiatric disorder, PUD (peptic ulcer disease), Seizures ([facility] [address] [facility] or Sleep disorder. Mr. Ana Olson  has a past surgical history that includes hx hernia repair and hx orthopaedic. Social History/Living Environment:   Home Environment: Apartment  # Steps to Enter: 12  Rails to Enter: Yes  [employer] : Bilateral  One/Two Story Residence: One story  Living Alone: No  Support Systems: Spouse/Significant Other/Partner  Patient Expects to be Discharged to[de-identified] Unknown  Current DME Used/Available at Home: None  Tub or Shower Type: Tub/Shower combination  Prior Level of Function/Work/Activity:  Pt lives at home with his wife. Pt is typically independent with ADL/functional mobility. Pt does not drive. Pt was working part-time at [employer]. Personal Factors:          Age:  53 y.o. Past/Current Experience:  CVA with flaccid L side        Other factors that influence how disability is experienced by the patient:  multiple co-morbidities    Number of Personal Factors/Comorbidities that affect the Plan of Care: Extensive review of physical, cognitive, and psychosocial performance (3+):  HIGH COMPLEXITY   ASSESSMENT OF OCCUPATIONAL PERFORMANCE[de-identified]   Activities of Daily Living:   Basic ADLs (From Assessment) Complex ADLs (From Assessment)   Feeding: Setup, Additional time  Oral Facial Hygiene/Grooming: Moderate assistance  Bathing: Moderate assistance  Upper Body Dressing: Maximum assistance  Lower Body Dressing: Maximum assistance  Toileting: Maximum assistance, Additional time, Adaptive equipment(wearing brief) Instrumental ADL  Meal Preparation: Total assistance  Homemaking:  Total assistance  Medication Management: Total assistance  Financial Management: Total assistance   Grooming/Bathing/Dressing Activities of Daily Living         Upper Body Bathing  Bathing Assistance: Min A   Position Performed: Seated in chair                  Lower Body Dressing Assistance  Socks: Total assistance (dependent)       Most Recent Physical Functioning:   Gross Assessment:                  Posture:     Balance:    Bed Mobility:     Wheelchair Mobility:     Transfers:               Patient Vitals for the past 6 hrs:   BP BP Patient Position SpO2 Pulse   20 0800 136/84 At rest 98 % 65   20 1200 112/76 At rest 98 % 78       Mental Status  Neurologic State: Alert, Appropriate for age  Orientation Level: Oriented X4  Cognition: Follows commands  Perception: Verbal, Cues to attend to left side of body, Cues to attend left visual field, Visual, Cues to maintain midline in sitting, Cues to maintain midline in standing, Tactile  Perseveration: No perseveration noted  Safety/Judgement: Fall prevention, Decreased awareness of need for assistance, Decreased awareness of need for safety, Decreased awareness of environment, Decreased insight into deficits                          Physical Skills Involved:  1. Range of Motion  2. Balance  3. Strength  4. Activity Tolerance  5. Fine Motor Control  6. Gross Motor Control Cognitive Skills Affected (resulting in the inability to perform in a timely and safe manner):  1. Perception  2. Expression Psychosocial Skills Affected:  1. Habits/Routines  2. Environmental Adaptation  3. Social Interaction  4. Emotional Regulation  5. Self-Awareness  6. Awareness of Others  7. Social Roles   Number of elements that affect the Plan of Care: 5+:  HIGH COMPLEXITY   CLINICAL DECISION MAKIN [facility] [address] [id number] AM-PAC 6 Clicks   Daily Activity Inpatient Short Form  How much help from another person does the patient currently need. .. Total A Lot A Little None   1. Putting on and taking off regular lower body clothing? [x] 1   [] 2   [] 3   [] 4   2. Bathing (including washing, rinsing, drying)? [] 1   [x] 2   [] 3   [] 4   3.   Toileting, which includes using toilet, bedpan or urinal?   [] 1   [x] 2   [] 3   [] 4   4. Putting on and taking off regular upper body clothing? [] 1   [x] 2   [] 3   [] 4   5. Taking care of personal grooming such as brushing teeth? [] 1   [x] 2   [] 3   [] 4   6. Eating meals? [] 1   [] 2   [x] 3   [] 4   © 2007, Trustees of [address] [place] [address] [address] [id number], under license to [employer]. All rights reserved      Score:  Initial: 11 Most Recent: 12 (Date: 3/3/2020 ),     Interpretation of Tool:  Represents activities that are increasingly more difficult (i.e. Bed mobility, Transfers, Gait). Medical Necessity:     · Patient demonstrates   · good and excellent  ·  rehab potential due to higher previous functional level. Reason for Services/Other Comments:  · Patient continues to require skilled intervention due to   · Decreased independence with ADL/functional transfers that impacts overall quality of life. · .   Use of outcome tool(s) and clinical judgement create a POC that gives a: MODERATE COMPLEXITY         TREATMENT:   (In addition to Assessment/Re-Assessment sessions the following treatments were rendered)     Pre-treatment Symptoms/Complaints:  \"I am tired now. \"  Pain: Initial:   Pain Intensity 1: 0  Post Session: no complaint of pain   Today's treatment session addressed Decreased Strength, Decreased ADL/Functional Activities, Decreased Transfer Abilities, Decreased Ambulation Ability/Technique, Decreased Balance, Increased Pain, Decreased Activity Tolerance, Decreased Pacing Skills, Decreased Work Simplification/Energy Conservation Techniques, Increased Fatigue, Decreased Flexibility/Joint Mobility, Decreased Knowledge of Precautions, Decreased Skin Integrity/Hygeine, Decreased [place] with Home Exercise Program and Decreased Cognition to progress towards achieving goal(s) as written above. During this session,  Physical Therapy addressed  Ambulation to progress towards their discipline specific goal(s). Co-treatment was necessary to improve patient's cognitive participation, ability to follow higher level commands, ability to increase activity demands and ability to return to normal functional activity. Reevaluation completed    Neuromuscular Re-education: (60 minutes):  Exercise/activities per grid below to improve balance, coordination, kinesthetic sense, posture and proprioception. Required moderate visual, verbal and manual cues to promote static and dynamic balance in standing. Worked on [facility]  Management with LEs and UEs. Date:  1/27/2020 Date:  2/11/2020 Date:   2/21/2020 2/24/2020  3/3/2020   Activity/Exercise Parameters Parameters Parameters     LUE finger flexion, AROM gravity minimized then AAROM to complete ROM 2x10 reps 2x10 reps 3 x's 10 reps 3 x's 10 reps 10 reps   LUE finger extension AAROM 2x10 reps 2x10 reps 3 x's 10 reps 3 x's 10 reps 10 reps   LUE wrist extension, AROM gravity minimized then AAROM to complete ROM 1x10 reps 1x10 reps 3 x's 10 reps 3 x's 10 reps 10 reps   LUE wrist extension, AROM against gravity  1x10 reps 1x10 reps 3 x's 10 reps 3 x's 10 reps 10 reps   LUE wrist flexion AAROM 2x10 reps 1x10 reps 3 x's 10 reps 3 x's 10 reps 10 reps   LUE elbow flexion/extension PROM x10 reps 1x10 reps 3 x's 10 reps 3 x's 10 reps 10 reps   LUE shoulder flexion PROM x10 reps 1x10 reps 3 x's 10 reps 3 x's 10 reps 10 reps   L UE pronation/supination PROM     10 reps                        Braces/Orthotics/Lines/Etc:   · O2 device: Room air  · Treatment/Session Assessment:    · Response to Treatment:  Pt demonstrated good participation, making progress, L inattention, needing cues.    · Interdisciplinary Collaboration:   o Physical Therapy Assistant  o Certified Occupational Therapy Assistant  o Registered Nurse  · After treatment position/precautions:   o Supine in bed  o Bed alarm/tab alert on  o Bed/Chair-wheels locked  o Bed in low position  o Call light within reach  o RN notified  o Side rails x 3   · Compliance with Program/Exercises: Compliant all of the time, Will assess as treatment progresses. · Recommendations/Intent for next treatment session: \"Next visit will focus on advancements to more challenging activities and reduction in assistance provided\".   Total Treatment Duration:  40 mins  OT Patient Time In/Time Out  Time In: 1243  Time Out: Bebe Arellano [address], OT   Rudolph Powell MS, OTR/L

## 2020-03-04 NOTE — PROGRESS NOTES
Problem: Mobility Impaired (Adult and Pediatric)  Goal: *Acute Goals and Plan of Care  Description  GOALS UPDATED ON RE-ASSESSMENT 2/26/2020  1. Patient will perform bed mobility with supervision and 0 verbal cues within 7 treatment days. 2. Patient will perform STS transfer with CGA within 7 treatment days. GOAL MET : 3/3/20  3. Patient will perform bed to (wheel) chair transfer with CGA with 7 treatment days. GOAL MET: 3/4/20  4. Patient will demonstrate fair+ dynamic balance throughout stance phase on L LE within 7 treatment days. 5. Patient will require CGA throughout swing phase on (to advance) L LE within 7 treatment days. 6. Patient demonstrate 3+/5 strength in L LE hip flexion, hip abduction, and hip adduction) within 7 treatment days. 7. Patient will ambulate 100ft+ with CGA and least retrictive assistive device within 7 treatment days. 8. Patient will perform wheelchair mobility x75ft with CGA within 7 treatment days. PHYSICAL THERAPY: Daily Note and PM 3/4/2020  INPATIENT: PT Visit Days : 6  Payor: MEDICAID PENDING / Plan: Keven Alisson PENDING / Product Type: Medicaid /       NAME/AGE/GENDER: Kervin Milian is a 53 y.o. male   PRIMARY DIAGNOSIS: Acute ischemic stroke ([address] [I63.9]  Cerebrovascular accident (CVA) due to embolism ([address] [L02.1] Acute embolic stroke ([address] Acute embolic stroke ([address]       ICD-10: Treatment Diagnosis:   · Difficulty in walking, Not elsewhere classified (R26.2)  · Hemiplegia and hemiparesis following cerebral infarction affecting left non-dominant side (J28.686)   Precaution/Allergies:  Patient has no known allergies. ASSESSMENT:     Mr. Patty Brown is a 53year old admitted R MCA CVA. Patient is currently demonstrating trace quad activation, 3-/5 hip flexion, 3-/5 hip adduction, 2+/5 hip abduction, and 2+/5 hamstring/knee flexion.      The patient is seated in the recliner chair upon contact and agreeable to therapy treatment this PM.  The patient performed weight shifting, leaning and posture in the chair while working with OT toward their goals. He then performed sit <>stand x4 with CGA progressing to close SBA and verbal cues for sequencing, posture, and technique. The patient took a brief seated rest break, then stood with SBA and gait trained 60' with the luke walker and min A for balance with verbal cues for advancing the LLE using the hip flexors, maintaining the toes in a forward direction, preventing knee hyperextension, and safety awareness. The patient took a seated rest break in the w/c and was able to stand again with SBA and minimal cues for sequencing. He gait trained an additional 61' with the same. The patient took a brief seated rest break before performing w/c mobility with CGA for steering and max verbal cues for sequencing using the BLE for 40'. The patient reported he was fatigued and returned to the room. He performed stand pivot transfer from w/c to bed with SBA without assist for set up and with verbal cues for technique. He demonstrated intact sitting balance at the edge of the bed while working on dynamic movements including scooting, leaning, bending, and weight shifting to improve approximation and awareness of the L hip while working with OT. The patient performed sit to supine with SBA and min to no cues for technique. The patient was then positioned for comfort with needs in reach. Overall the patient is making slow steady progress toward therapy goals as indicated by decreased assistance levels. GOALS met today are above in RED. Will continue skilled PT treatment as patient is still below functional baseline. At this time, patient is appropriate for Co-treatment with occupational therapy due to patient's decreased overall endurance/tolerance levels, as well as need for high level skilled assistance to complete functional transfers/mobility and functional tasks.  Carlos Strauss is appropriate for a multidisciplinary co-treatment of PT and OT to address goals of both disciplines. This section established at most recent assessment   PROBLEM LIST (Impairments causing functional limitations):  1. Decreased Strength  2. Decreased ADL/Functional Activities  3. Decreased Transfer Abilities  4. Decreased Ambulation Ability/Technique  5. Decreased Balance  6. Increased Pain  7. Decreased Activity Tolerance  8. Increased Fatigue  9. Decreased Flexibility/Joint Mobility   INTERVENTIONS PLANNED: (Benefits and precautions of physical therapy have been discussed with the patient.)  1. Balance Exercise  2. Bed Mobility  3. Family Education  4. Gait Training  5. Home Exercise Program (HEP)  6. Manual Therapy  7. Neuromuscular Re-education/Strengthening  8. Range of Motion (ROM)  9. Therapeutic Activites  10. Therapeutic Exercise/Strengthening  11. Transfer Training     TREATMENT PLAN: Frequency/Duration: 3 times a week for duration of hospital stay  Rehabilitation Potential For Stated Goals: Good     REHAB RECOMMENDATIONS (at time of discharge pending progress):    Placement: It is my opinion, based on this patient's performance to date, that Mr. Jonathon Smith may benefit from intensive therapy at an [address] [place] [address] after discharge due to a probable need for close medical supervision by a rehab physician, a probable need for multiple therapy disciplines and potential to make ongoing and sustainable functional improvement that is of practical value. .  Equipment:    TBD pending progress with therapy. HISTORY:   History of Present Injury/Illness (Reason for Referral):  Per H&P: \"Pt is a 54 y/o smoker with DM, HTN, who presented to ER with L leg and arm weakness, L facial droop, dysarthria. First noted L leg weakness late night 12/11 when he woke up to go to the bathroom. He was normal when he went to bed around 1030. Woke up this morning and had persistent weakness L leg and also now noted in L arm. EMS called. Noted to have slurred speech and L facial droop as well. Code S called in ER around 9am.  MRI with acute infarcts in L cerebellar hemisphere, deep frontoparietal white matter, and R paramedian yariel. Also noted old lacunar infarcts. No large vessel occlusion on CTA, but some stenosis noted. No hx afib, TIA, CVA. No CP, palpitations, SOB. \"  Past Medical History/Comorbidities:   Mr. Annie Sykes  has a past medical history of Acute ischemic stroke ([address] (12/12/2019), Acute pancreatitis (11/19/2014), Cerebrovascular accident (CVA) due to embolism ([address] (12/12/2019), Diabetes ([address] (2002), Diabetes ([address] Diabetes mellitus, and Hypertension. He also has no past medical history of Arthritis, Asthma, Autoimmune disease ([address] CAD (coronary artery disease), Cancer ([address] Chronic kidney disease, COPD, Dementia, Dementia ([address] Heart failure ([address] Ill-defined condition, Infectious disease, Liver disease, Other ill-defined conditions(799.89), Psychiatric disorder, PUD (peptic ulcer disease), Seizures ([address] or Sleep disorder. Mr. Annie Sykes  has a past surgical history that includes hx hernia repair and hx orthopaedic. Social History/Living Environment:   Home Environment: Apartment  # Steps to Enter: 12  Rails to Enter: Yes  [employer] : Bilateral  One/Two Story Residence: One story  Living Alone: No  Support Systems: Spouse/Significant Other/Partner  Patient Expects to be Discharged to[de-identified] Unknown  Current DME Used/Available at Home: None  Tub or Shower Type: Tub/Shower combination  Prior Level of Function/Work/Activity:  Independent, lives with wife in 2nd story 1 level apartment. No recent falls. Number of Personal Factors/Comorbidities that affect the Plan of Care: 1-2: MODERATE COMPLEXITY   EXAMINATION:   Most Recent Physical Functioning:   Gross Assessment:                  Posture:     Balance:  Sitting - Static: Good (unsupported)  Sitting - Dynamic: Fair (occasional); Good (unsupported)  Standing: With support  Standing - Static: Fair;Good  Standing - Dynamic : Fair Bed Mobility:  Supine to Sit: Stand-by assistance  Sit to Supine: Supervision  Scooting: Stand-by assistance  Interventions: Verbal cues;Manual cues  Wheelchair Mobility:  Distance (ft): 40 feet  Time Required for Task: 8 minutes  Level of Assistance: Contact guard assistance  Interventions: Verbal cues;Demonstration  Transfers:  Sit to Stand: Stand-by assistance  Stand to Sit: Stand-by assistance  Stand Pivot Transfers: Contact guard assistance;Stand-by assistance  Interventions: Verbal cues  Gait:     Base of Support: Center of gravity altered;Narrowed  Speed/Clotilde: Slow  Gait Abnormalities: Hemiplegic  Distance (ft): 60 Feet (ft)(x2)  Assistive Device: Walker luke;Gait belt  Ambulation - Level of Assistance: Minimal assistance;Contact guard assistance  Interventions: Verbal cues; Safety awareness training      Body Structures Involved:  1. Nerves  2. Voice/Speech  3. Bones  4. Joints  5. Muscles Body Functions Affected:  1. Mental  2. Sensory/Pain  3. Neuromusculoskeletal  4. Movement Related Activities and Participation Affected:  1. General Tasks and Demands  2. Mobility  3. Self Care  4. Interpersonal Interactions and Relationships   Number of elements that affect the Plan of Care: 4+: HIGH COMPLEXITY   CLINICAL PRESENTATION:   Presentation: Evolving clinical presentation with changing clinical characteristics: MODERATE COMPLEXITY   CLINICAL DECISION MAKIN [facility] Mobility Inpatient Short Form  How much difficulty does the patient currently have. .. Unable A Lot A Little None   1. Turning over in bed (including adjusting bedclothes, sheets and blankets)? [] 1   [] 2   [x] 3   [] 4   2. Sitting down on and standing up from a chair with arms ( e.g., wheelchair, bedside commode, etc.)   [] 1   [] 2   [x] 3   [] 4   3. Moving from lying on back to sitting on the side of the bed?    [] 1   [] 2   [x] 3   [] 4   How much help from another person does the patient currently need. .. Total A Lot A Little None   4. Moving to and from a bed to a chair (including a wheelchair)? [] 1   [] 2   [x] 3   [] 4   5. Need to walk in hospital room? [] 1   [x] 2   [] 3   [] 4   6. Climbing 3-5 steps with a railing? [] 1   [x] 2   [] 3   [] 4   © 2007, Trustees of [address] [place] [id number], under license to [employer]. All rights reserved      Score:  Initial: 13 Most Recent: 16 (Date:2/26/2020)    Interpretation of Tool:  Represents activities that are increasingly more difficult (i.e. Bed mobility, Transfers, Gait). Medical Necessity:     · Patient is expected to demonstrate progress in   · strength, range of motion, balance, coordination, and functional technique  ·  to   · increase independence with all mobility. · .  Reason for Services/Other Comments:  · Patient continues to require skilled intervention due to   · medical complications and mobility deficits which impact his level of function, safety, and independence as indicated above. · .   Use of outcome tool(s) and clinical judgement create a POC that gives a: Questionable prediction of patient's progress: MODERATE COMPLEXITY        TREATMENT:      Pre-treatment Symptoms/Complaints:  none  Pain: Initial: 0/10 Post Session:  0/10     Physical Therapy Re-Evaluation (untimed charge)    Gait Training (  30 Minutes):  Gait training to improve and/or restore physical functioning as related to mobility, strength and balance. Ambulated 60 Feet (ft)(x2) with Minimal assistance;Contact guard assistance using a Walker luke;Gait belt and moderate Verbal cues; Safety awareness training related to their sequencing, knee positioning, foot placement, step length, weight shifts, luke walker placement, and posture to promote proper body alignment, promote proper body posture and promote proper body mechanics.   Instruction in performance of the above deficits to correct overall gait sequencing and quality. Therapeutic Activity: (    30 minutes): Therapeutic activities including Bed transfers, Chair transfers and static/dynamic sitting balance training to improve mobility, strength and balance. Required moderate Verbal cues; Safety awareness training to promote static and dynamic balance in sitting. Therapeutic Exercise: (   min): Functional exercises including static and dynamic standing balance, sit<>stand transfers, and bed mobility below to improve mobility, strength and balance. Required minimal verbal cues to promote proper body alignment and promote proper body posture. Progressed resistance, range and repetitions as indicated. Today's treatment session addressed Decreased Strength, Decreased ADL/Functional Activities, Decreased Transfer Abilities, Decreased Ambulation Ability/Technique, Decreased Balance and Decreased Activity Tolerance to progress towards achieving goal(s) 1, 2, 3, 4 and 5. During this session, Occupational Therapy addressed ADLs to progress towards their discipline specific goal(s). Co-treatment was necessary to improve patient's ability to increase activity demands, ability to return to normal functional activity and attention to activity. Braces/Orthotics/Lines/Etc:   · Sling to L UE throughout transfers and ambulation  Treatment/Session Assessment:    · Response to Treatment:  See above  · Interdisciplinary Collaboration:   o Physical Therapy Assistant  o Occupational Therapist  o Registered Nurse  · After treatment position/precautions:   o Supine in bed  o Bed/Chair-wheels locked  o Bed in low position  o Call light within reach  o RN notified   · Compliance with Program/Exercises: Compliant all of the time  · Recommendations/Intent for next treatment session: \"Next visit will focus on advancements to more challenging activities and reduction in assistance provided\".     Total Treatment Duration:  PT Patient Time In/Time Out  Time In: 1243  Time Out: [address] Final, PTA

## 2020-03-05 LAB — GLUCOSE BLD STRIP.AUTO-MCNC: 85 MG/DL (ref 65–100)

## 2020-03-05 PROCEDURE — 92507 TX SP LANG VOICE COMM INDIV: CPT

## 2020-03-05 PROCEDURE — 65270000029 HC RM PRIVATE

## 2020-03-05 PROCEDURE — 74011250637 HC RX REV CODE- 250/637: Performed by: FAMILY MEDICINE

## 2020-03-05 PROCEDURE — 74011250637 HC RX REV CODE- 250/637: Performed by: HOSPITALIST

## 2020-03-05 PROCEDURE — 74011250637 HC RX REV CODE- 250/637: Performed by: INTERNAL MEDICINE

## 2020-03-05 PROCEDURE — 74011250636 HC RX REV CODE- 250/636: Performed by: INTERNAL MEDICINE

## 2020-03-05 PROCEDURE — 82962 GLUCOSE BLOOD TEST: CPT

## 2020-03-05 RX ADMIN — METFORMIN HYDROCHLORIDE 1000 MG: 500 TABLET ORAL at 18:04

## 2020-03-05 RX ADMIN — ACETAMINOPHEN 650 MG: 325 TABLET, FILM COATED ORAL at 13:57

## 2020-03-05 RX ADMIN — GUAIFENESIN 100 MG: 100 SOLUTION ORAL at 04:34

## 2020-03-05 RX ADMIN — DIPHENHYDRAMINE HYDROCHLORIDE 25 MG: 25 CAPSULE ORAL at 18:04

## 2020-03-05 RX ADMIN — ENOXAPARIN SODIUM 40 MG: 40 INJECTION SUBCUTANEOUS at 13:57

## 2020-03-05 RX ADMIN — ASPIRIN 81 MG 81 MG: 81 TABLET ORAL at 08:28

## 2020-03-05 RX ADMIN — METOPROLOL SUCCINATE 25 MG: 25 TABLET, FILM COATED, EXTENDED RELEASE ORAL at 08:28

## 2020-03-05 RX ADMIN — ACETAMINOPHEN 650 MG: 325 TABLET, FILM COATED ORAL at 22:16

## 2020-03-05 RX ADMIN — ATORVASTATIN CALCIUM 80 MG: 80 TABLET, FILM COATED ORAL at 22:16

## 2020-03-05 RX ADMIN — LISINOPRIL 40 MG: 20 TABLET ORAL at 08:28

## 2020-03-05 RX ADMIN — METFORMIN HYDROCHLORIDE 1000 MG: 500 TABLET ORAL at 08:28

## 2020-03-05 RX ADMIN — ACETAMINOPHEN 650 MG: 325 TABLET, FILM COATED ORAL at 04:33

## 2020-03-05 RX ADMIN — GUAIFENESIN 100 MG: 100 SOLUTION ORAL at 18:04

## 2020-03-05 NOTE — PROGRESS NOTES
Problem: Pressure Injury - Risk of  Goal: *Prevention of pressure injury  Description  Document Dewey Scale and appropriate interventions in the flowsheet. Outcome: Progressing Towards Goal  Note: Pressure Injury Interventions:  Sensory Interventions: Assess changes in LOC, Discuss PT/OT consult with provider, Float heels, Keep linens dry and wrinkle-free, Maintain/enhance activity level    Moisture Interventions: Absorbent underpads, Check for incontinence Q2 hours and as needed, Limit adult briefs, Maintain skin hydration (lotion/cream)    Activity Interventions: Increase time out of bed, Pressure redistribution bed/mattress(bed type), PT/OT evaluation    Mobility Interventions: HOB 30 degrees or less, Pressure redistribution bed/mattress (bed type), PT/OT evaluation    Nutrition Interventions: Document food/fluid/supplement intake, Offer support with meals,snacks and hydration    Friction and Shear Interventions: Lift sheet, HOB 30 degrees or less    Problem: Falls - Risk of  Goal: *Absence of Falls  Description  Document Jeanne Fall Risk and appropriate interventions in the flowsheet.   Outcome: Progressing Towards Goal  Note: Fall Risk Interventions:  Mobility Interventions: Bed/chair exit alarm, Communicate number of staff needed for ambulation/transfer, OT consult for ADLs, Patient to call before getting OOB, PT Consult for mobility concerns, Utilize walker, cane, or other assistive device    Mentation Interventions: Adequate sleep, hydration, pain control, Bed/chair exit alarm, Door open when patient unattended, More frequent rounding, Toileting rounds, Update white board    Medication Interventions: Bed/chair exit alarm, Evaluate medications/consider consulting pharmacy, Patient to call before getting OOB, Teach patient to arise slowly    Elimination Interventions: Bed/chair exit alarm, Call light in reach, Patient to call for help with toileting needs, Toileting schedule/hourly rounds    History of Falls Interventions: Bed/chair exit alarm, Consult care management for discharge planning, Door open when patient unattended, Evaluate medications/consider consulting pharmacy    Problem: General Medical Care Plan  Goal: *Vital signs within specified parameters  Outcome: Progressing Towards Goal  Goal: *Optimal pain control at patient's stated goal  Outcome: Progressing Towards Goal     Problem: Ischemic Stroke: Discharge Outcomes  Goal: *Hemodynamically stable  Outcome: Progressing Towards Goal  Goal: *Tolerating diet  Outcome: Progressing Towards Goal  Goal: *Absence of DVT(Stroke Metric)  Outcome: Progressing Towards Goal  Goal: *Absence of aspiration  Outcome: Progressing Towards Goal  Goal: *Understands and describes signs and symptoms to report to providers(Stroke Metric)  Outcome: Progressing Towards Goal

## 2020-03-05 NOTE — PROGRESS NOTES
Neurolinguistic Goals:  LTG: Patient will increase neuro-linguistic abilities to increase safety and awareness in functional living environment   STG: Patient will solve mathematical problems with 80%accuracy given minimal cueing   STG: Patient will name 10 items to concrete category in 1 minute given minimal cueing. Progressing 1/31/2020  STG: Patient will participate in verbal reasoning tasks with 80% accuracy given minimal cueing  STG: Patient will complete mental manipulation tasks with 80% accuracy given minimal cueing  STG: Patient will complete working memory/attention tasks with 80% accuracy given minimal cueing  STG: Patient will recall relevant verbal information with 80% accuracy with minimal assistance  STG: Patient will utilize memory compensatory strategies to improve recall of functional list/message with 90%accuracy given minimal assistance  STG: Patient will participate in ongoing cognitive linguistic evaluation for therapeutic assessment. MET 1/31/2020     Dysarthria Goals:  LTG: Patient will develop functional and intelligible speech and utilize compensatory strategies to improve communication across environments  STG: Patient will utilize compensatory strategies at conversation level with 90% accuracy independently. Added 1/31/2020      SPEECH LANGUAGE PATHOLOGY: SPEECH-LANGUAGE/COGNITION: Daily Note 9    NAME/AGE/GENDER: Manjeet Clifford is a 53 y.o. male  DATE: 3/5/2020  PRIMARY DIAGNOSIS: Acute ischemic stroke ([facility] [I63.9]  Cerebrovascular accident (CVA) due to embolism ([address] [I63.9]      ICD-10: Treatment Diagnosis: R47.1 Dysarthria and Anarthria  R41.841 Cognitive-Communication Deficit    RECOMMENDATIONS   TREATMENT RECOMMENDATIONS:    Continue to follow for dysarthria and cognitive linguistic treatment.       STRATEGIES:    Speech strategies: slow rate/over articulate to improve articulatory precision   Memory: repeat and write down novel/relevant information EDUCATION:  · Recommendations discussed with Patient     Continuation of Skilled Services/Medical Necessity:   Patient is expected to demonstrate progress in expressive communication and cognitive ability to decrease assistance required communication, increase independence with activities of daily living and increase communication with family/caregivers.  Patient continues to require skilled intervention due to dysarthria and cognitive linguistic deficits. RECOMMENDATIONS for CONTINUED SPEECH THERAPY: YES: Anticipate need for ongoing speech therapy during this hospitalization and at next level of care. ASSESSMENT   Patient continues to exhibit reduced functional reasoning for ADLs including medication management and functional math calculations. Short term memory also remains significantly impaired. Moderate dysarthria persists. Patient responds to verbal cues to utilize compensatory strategies for improving speech intelligibly and short term memory; however, poor carryover of strategies independently. Recommend: Ongoing speech therapy targeting speech and cognitive linguistic function. COMPLIANCE WITH PROGRAM/EXERCISES: Will assess as treatment progresses  REHABILITATION POTENTIAL FOR STATED GOALS: Good    PLAN    FREQUENCY/DURATION: Continue to follow patient 2 times a week for duration of hospital stay to address above goals. - Recommendations for next treatment session: Next treatment will address dysarthria and cognitive linguistic deficits    SUBJECTIVE   Patient alert upright in bed for session.      Orientation:   Person  Place  Time  Situation    Pain: Pain Scale 1: Numeric (0 - 10)  Pain Intensity 1: 0    OBJECTIVE   The following treatment tasks were completed:   Patient was administered portions of the RAE with results as follows:    Solving Daily Math Problems: 6/10 accurate independently- required increased time to complete task- approximately 18 minutes  Reading Prescription labels: 9/10 accurate      Tool Used: MODIFIED BRITT SCALE (mRS)   Score   No Symptoms  [] 0   No significant disability despite symptoms; able to carry out all usual duties and activities  [] 1   Slight disability; unable to carry out all previous activities but able to look after own affairs without assistance. [] 2   Moderate disability; requiring some help but able to walk without assistance  [x] 3   Moderately severe disability; unable to walk without assistance and unable to attend to own bodily needs without assistance  [] 4   Severe disability; bedridden, incontinent, and requiring constant nursing care and attention  [] 5      Score:  Initial: 3 Current: 3   Interpretation of Tool: The Modified Burns Scale is a 7-point scaled used to quantify level of disability as it relates to a patient's functional abilities. INTERDISCIPLINARY COLLABORATION: Registered Nurse  PRECAUTIONS/ALLERGIES: Patient has no known allergies. SAFETY:  After treatment position/precautions:  · Upright in bed  · Call light within reach  · Friend at bedside.     Total Treatment Duration:     Time In: 1359  Time Out: [address] [facility] [address], [address] [facility]

## 2020-03-06 LAB — GLUCOSE BLD STRIP.AUTO-MCNC: 92 MG/DL (ref 65–100)

## 2020-03-06 PROCEDURE — 82962 GLUCOSE BLOOD TEST: CPT

## 2020-03-06 PROCEDURE — 74011250637 HC RX REV CODE- 250/637: Performed by: HOSPITALIST

## 2020-03-06 PROCEDURE — 74011250637 HC RX REV CODE- 250/637: Performed by: INTERNAL MEDICINE

## 2020-03-06 PROCEDURE — 74011250636 HC RX REV CODE- 250/636: Performed by: INTERNAL MEDICINE

## 2020-03-06 PROCEDURE — 74011250637 HC RX REV CODE- 250/637: Performed by: FAMILY MEDICINE

## 2020-03-06 PROCEDURE — 65270000029 HC RM PRIVATE

## 2020-03-06 RX ADMIN — LISINOPRIL 40 MG: 20 TABLET ORAL at 08:00

## 2020-03-06 RX ADMIN — METOPROLOL SUCCINATE 25 MG: 25 TABLET, FILM COATED, EXTENDED RELEASE ORAL at 08:00

## 2020-03-06 RX ADMIN — ATORVASTATIN CALCIUM 80 MG: 80 TABLET, FILM COATED ORAL at 21:31

## 2020-03-06 RX ADMIN — ACETAMINOPHEN 650 MG: 325 TABLET, FILM COATED ORAL at 13:49

## 2020-03-06 RX ADMIN — DIPHENHYDRAMINE HYDROCHLORIDE 25 MG: 25 CAPSULE ORAL at 17:04

## 2020-03-06 RX ADMIN — ASPIRIN 81 MG 81 MG: 81 TABLET ORAL at 08:00

## 2020-03-06 RX ADMIN — METFORMIN HYDROCHLORIDE 1000 MG: 500 TABLET ORAL at 17:04

## 2020-03-06 RX ADMIN — ENOXAPARIN SODIUM 40 MG: 40 INJECTION SUBCUTANEOUS at 13:49

## 2020-03-06 RX ADMIN — GUAIFENESIN 100 MG: 100 SOLUTION ORAL at 17:04

## 2020-03-06 RX ADMIN — METFORMIN HYDROCHLORIDE 1000 MG: 500 TABLET ORAL at 08:00

## 2020-03-06 RX ADMIN — ACETAMINOPHEN 650 MG: 325 TABLET, FILM COATED ORAL at 21:31

## 2020-03-06 NOTE — PROGRESS NOTES
Hospitalist Progress Note     Admit Date:  2019  9:07 AM   Name:  Ebenezer Lima   Age:  53 y.o.  :  1975   MRN:  [id number]   PCP:  Jayesh Herr MD  Treatment Team: Attending Provider: Eugenia Dhaliwal MD; Care Manager: Maya Paniagua RN; Care Manager: Rose Anna LMSW; Utilization Review: Soumya Guzmán RN; Nurse Practitioner: Mannie Bruce NP; Physical Therapist: Nikita Sampson DPT    Subjective:   54 yo male with PMH of DM II, HTN who presented 19 with c/o left leg and arm weakness, left facial droop and dysarthria.  Code S called. MRI with acute infarctions in left cerebellar hemisphere, deep frontoparietal white matter and right paramedian yariel.  Also noted to have old lacunar infarcts. CTA without large vessel occlusions, however some stenosis noted.  Echo showed PFO, Cardiology to f/u outpatient for evaluation for closure.  Neurology consulted.  Started on ASA, statin.  Pt is uninsured, difficult placement, case management working on plan.     Today: No acute events. No SOB or cough or difficulty swallowing, work w/ PT. slided off bed yesterday w/ no injury. Objective:     Patient Vitals for the past 24 hrs:   Temp Pulse Resp BP SpO2   20 1200 97.9 °F (36.6 °C) 78 18 131/71 97 %   20 0800 97.7 °F (36.5 °C) 76 17 132/86 95 %   20 0400 98.1 °F (36.7 °C) 80 16 124/84 97 %   20 0000 98.3 °F (36.8 °C) 78 16 134/84 96 %   20 2000 98.2 °F (36.8 °C) 74 16 131/84 99 %   20 1600 98.1 °F (36.7 °C) 72 16 125/75 96 %     Oxygen Therapy  O2 Sat (%): 97 % (20 1200)  Pulse via Oximetry: 66 beats per minute (20 0000)  O2 Device: Room air (20 0000)    Intake/Output Summary (Last 24 hours) at 3/6/2020 1243  Last data filed at 3/6/2020 1006  Gross per 24 hour   Intake 250 ml   Output    Net 250 ml         General:    Lying in bed watching VH1  CV:   RRR. No murmur, rub, or gallop. Lungs:   CTAB.   No wheezing, rhonchi, or rales.  Abdomen:   Soft, nontender, nondistended. Extremities: Warm and dry. No cyanosis or edema. Skin:     No rashes or jaundice. Data Review:  I have reviewed all labs, meds, telemetry events, and studies from the last 24 hours. Recent Results (from the past 24 hour(s))   GLUCOSE, POC    Collection Time: 03/06/20  7:03 AM   Result Value Ref Range    Glucose (POC) 92 65 - 100 mg/dL        All Micro Results     None          Current Meds:  Current Facility-Administered Medications   Medication Dose Route Frequency    acetaminophen (TYLENOL) tablet 650 mg  650 mg Oral Q4H PRN    metFORMIN (GLUCOPHAGE) tablet 1,000 mg  1,000 mg Oral BID WITH MEALS    diphenhydrAMINE (BENADRYL) capsule 25 mg  25 mg Oral Q6H PRN    acetaminophen (TYLENOL) tablet 650 mg  650 mg Oral Q8H    lip protectant (BLISTEX) ointment 1 Each  1 Each Topical PRN    metoprolol succinate (TOPROL-XL) XL tablet 25 mg  25 mg Oral DAILY    polyethylene glycol (MIRALAX) packet 17 g  17 g Oral DAILY PRN    guaiFENesin (ROBITUSSIN) 100 mg/5 mL oral liquid 100 mg  100 mg Oral Q4H PRN    hydrALAZINE (APRESOLINE) 20 mg/mL injection 20 mg  20 mg IntraVENous Q4H PRN    atorvastatin (LIPITOR) tablet 80 mg  80 mg Oral QHS    lisinopril (PRINIVIL, ZESTRIL) tablet 40 mg  40 mg Oral DAILY    sodium chloride (NS) flush 5-40 mL  5-40 mL IntraVENous PRN    ondansetron (ZOFRAN) injection 4 mg  4 mg IntraVENous Q4H PRN    aspirin chewable tablet 81 mg  81 mg Oral DAILY    enoxaparin (LOVENOX) injection 40 mg  40 mg SubCUTAneous Q24H    dextrose 40% (GLUTOSE) oral gel 1 Tube  15 g Oral PRN    glucagon (GLUCAGEN) injection 1 mg  1 mg IntraMUSCular PRN    dextrose (D50W) injection syrg 12.5-25 g  25-50 mL IntraVENous PRN       Other Studies (last 24 hours):  No results found.     Assessment and Plan:     Hospital Problems as of 3/6/2020 Date Reviewed: 3/3/2017          Codes Class Noted - Resolved POA    PFO (patent foramen ovale) ICD-10-CM: Q21.1  ICD-9-CM: 745.5  12/17/2019 - Present Yes        Arrhythmia ICD-10-CM: I49.9  ICD-9-CM: 427.9  12/15/2019 - Present Yes        Hyperlipemia ICD-10-CM: E78.5  ICD-9-CM: 272.4  12/15/2019 - Present Yes        * (Principal) Acute embolic stroke [facility]) [id number]: I63.9  ICD-9-CM: 434.11  12/12/2019 - Present Yes        Hypertension (Chronic) ICD-10-CM: I10  ICD-9-CM: 401.9  Unknown - Present Yes        Type 2 diabetes mellitus (HCC) (Chronic) ICD-10-CM: E11.9  ICD-9-CM: 250.00  Unknown - Present Yes              PLAN:    Acute embolic stroke  -is \"outpatient ready\" so no routine labs indicated at this point  -MRI with acute infarcts in L cerebellar hemisphere, deep frontoparietal white matter, and R paramedian yariel. old lacunar infarcts. -ISMAEL: 3 mm PFO, Cardiology stated he needs an evaluation for closure PFO with high risk features including significant (3 mm) separation of septum secundum and primum as well as large shunt.  Will await recovery of CVA.  Plan is 4 week event monitor as outpatient.  Will then see in office and if no arrhythmias, will plan PFO closure  -continue statin, ASA     Hypertension:   -lisinopril, metoprolol     Type 2 diabetes mellitus:    -Patient currently on metformin 2000mg daily     Left upper extremity pain  -titrate down tylenol from q6h to q8h (2/23)     Allergies  Benadryl for allergies as needed    DISPO: waiting for pt to be deemed \"disabled\" which in neurologic cases have to allow for 6 months showing chronicity.  Then he can get medicaid after that. Medically stable for discharge.      DVT ppx:  lovenox   Signed:  Polina Waldron MD

## 2020-03-07 PROBLEM — E83.42 HYPOMAGNESEMIA: Status: ACTIVE | Noted: 2020-03-07

## 2020-03-07 LAB — GLUCOSE BLD STRIP.AUTO-MCNC: 85 MG/DL (ref 65–100)

## 2020-03-07 PROCEDURE — 74011250637 HC RX REV CODE- 250/637: Performed by: FAMILY MEDICINE

## 2020-03-07 PROCEDURE — 65270000029 HC RM PRIVATE

## 2020-03-07 PROCEDURE — 74011250636 HC RX REV CODE- 250/636: Performed by: INTERNAL MEDICINE

## 2020-03-07 PROCEDURE — 82962 GLUCOSE BLOOD TEST: CPT

## 2020-03-07 PROCEDURE — 74011250637 HC RX REV CODE- 250/637: Performed by: HOSPITALIST

## 2020-03-07 PROCEDURE — 74011250637 HC RX REV CODE- 250/637: Performed by: INTERNAL MEDICINE

## 2020-03-07 RX ORDER — METFORMIN HYDROCHLORIDE 500 MG/1
500 TABLET ORAL 2 TIMES DAILY WITH MEALS
Status: DISCONTINUED | OUTPATIENT
Start: 2020-03-07 | End: 2020-03-09

## 2020-03-07 RX ADMIN — ACETAMINOPHEN 650 MG: 325 TABLET, FILM COATED ORAL at 15:01

## 2020-03-07 RX ADMIN — LISINOPRIL 40 MG: 20 TABLET ORAL at 08:08

## 2020-03-07 RX ADMIN — METFORMIN HYDROCHLORIDE 500 MG: 500 TABLET ORAL at 18:13

## 2020-03-07 RX ADMIN — ASPIRIN 81 MG 81 MG: 81 TABLET ORAL at 08:09

## 2020-03-07 RX ADMIN — METOPROLOL SUCCINATE 25 MG: 25 TABLET, FILM COATED, EXTENDED RELEASE ORAL at 08:08

## 2020-03-07 RX ADMIN — ENOXAPARIN SODIUM 40 MG: 40 INJECTION SUBCUTANEOUS at 15:01

## 2020-03-07 RX ADMIN — ACETAMINOPHEN 650 MG: 325 TABLET, FILM COATED ORAL at 22:14

## 2020-03-07 RX ADMIN — Medication 5 ML: at 22:14

## 2020-03-07 RX ADMIN — GUAIFENESIN 100 MG: 100 SOLUTION ORAL at 18:13

## 2020-03-07 RX ADMIN — ATORVASTATIN CALCIUM 80 MG: 80 TABLET, FILM COATED ORAL at 22:14

## 2020-03-07 RX ADMIN — DIPHENHYDRAMINE HYDROCHLORIDE 25 MG: 25 CAPSULE ORAL at 18:13

## 2020-03-07 RX ADMIN — METFORMIN HYDROCHLORIDE 1000 MG: 500 TABLET ORAL at 08:08

## 2020-03-07 NOTE — PROGRESS NOTES
Hospitalist Progress Note     Admit Date:  2019  9:07 AM   Name:  Daina Pinzon   Age:  53 y.o.  :  1975   MRN:  [id number]   PCP:  Paula Jiménez MD  Treatment Team: Attending Provider: Xander Jordan DO; Care Manager: Chantale Peña RN; Care Manager: Callie Mir LM; Utilization Review: Josh Koenig RN; Nurse Practitioner: Alpesh Beltran NP; Charge Nurse: Nano Durham    Subjective:   HPI and or CC:  52-year-old hypertensive diabetic male presented to the ER with left-sided weakness left facial droop. Acute infarct left cerebellar hemisphere and deep frontoparietal white matter abnormalities as well as right paramedian yariel abnormalities. Old lacunar infarcts noted. No large vessel occlusion on CTA. He has been hospitalized for 86 days awaiting difficult rehab placement. Receiving physical occupational therapy and speech therapy. Tolerating high-dose statin atorvastatin and antiplatelet. PFO noted on ISMAEL 4-week event monitor planned as outpatient and if no dysrhythmia outpatient PFO closure recommended. Patient has no new complaints except awaiting placement        Objective:     Patient Vitals for the past 24 hrs:   Temp Pulse Resp BP SpO2   20 1200 98.6 °F (37 °C) 86 18 134/78 99 %   20 0800 97.9 °F (36.6 °C) 73 19 115/75 98 %   20 0400 97.7 °F (36.5 °C) 72 20 118/70 97 %   20 0000 97.9 °F (36.6 °C) 71 18 129/70 97 %   20 2000 98.1 °F (36.7 °C) 78 18 109/72 98 %   20 1600 98.2 °F (36.8 °C) 95 18 118/77 94 %     Oxygen Therapy  O2 Sat (%): 99 % (20 1200)  Pulse via Oximetry: 66 beats per minute (20 0000)  O2 Device: Room air (20 0000)    Intake/Output Summary (Last 24 hours) at 3/7/2020 1530  Last data filed at 3/7/2020 1420  Gross per 24 hour   Intake 437 ml   Output    Net 437 ml         REVIEW OF SYSTEMS: Comprehensive ROS performed and negative except as stated in HPI.     Physical Examination:  General:    Well nourished. No gross distress. Head:  Normocephalic, atraumatic, nares patent  Eyes:  Extraocular movements intact, normal sclera  CV:   RRR. No  Murmurs, clicks, or gallops, distal pulses intact  Lungs:   Unlabored, no cyanosis, no wheeze  Abdomen:   Soft, nondistended, nontender. Extremities: Stasis edema  Skin:     No rashes or jaundice. Neuro:  Left-sided paresis upper extremity worse than lower. Speech is understandable but dysarthric  Psych:  Mood and affect appropriate    Data Review:  I have reviewed all labs, meds, telemetry events, and studies from the last 24 hours.     Recent Results (from the past 24 hour(s))   GLUCOSE, POC    Collection Time: 03/07/20  6:58 AM   Result Value Ref Range    Glucose (POC) 85 65 - 100 mg/dL        All Micro Results     None          Current Meds:  Current Facility-Administered Medications   Medication Dose Route Frequency    metFORMIN (GLUCOPHAGE) tablet 500 mg  500 mg Oral BID WITH MEALS    acetaminophen (TYLENOL) tablet 650 mg  650 mg Oral Q4H PRN    diphenhydrAMINE (BENADRYL) capsule 25 mg  25 mg Oral Q6H PRN    acetaminophen (TYLENOL) tablet 650 mg  650 mg Oral Q8H    lip protectant (BLISTEX) ointment 1 Each  1 Each Topical PRN    metoprolol succinate (TOPROL-XL) XL tablet 25 mg  25 mg Oral DAILY    polyethylene glycol (MIRALAX) packet 17 g  17 g Oral DAILY PRN    guaiFENesin (ROBITUSSIN) 100 mg/5 mL oral liquid 100 mg  100 mg Oral Q4H PRN    hydrALAZINE (APRESOLINE) 20 mg/mL injection 20 mg  20 mg IntraVENous Q4H PRN    atorvastatin (LIPITOR) tablet 80 mg  80 mg Oral QHS    lisinopril (PRINIVIL, ZESTRIL) tablet 40 mg  40 mg Oral DAILY    sodium chloride (NS) flush 5-40 mL  5-40 mL IntraVENous PRN    ondansetron (ZOFRAN) injection 4 mg  4 mg IntraVENous Q4H PRN    aspirin chewable tablet 81 mg  81 mg Oral DAILY    enoxaparin (LOVENOX) injection 40 mg  40 mg SubCUTAneous Q24H    dextrose 40% (GLUTOSE) oral gel 1 Tube 15 g Oral PRN    glucagon (GLUCAGEN) injection 1 mg  1 mg IntraMUSCular PRN    dextrose (D50W) injection syrg 12.5-25 g  25-50 mL IntraVENous PRN       Diet:  DIET NUTRITIONAL SUPPLEMENTS  DIET DIABETIC CONSISTENT CARB    Other Studies (last 24 hours):  No results found. Assessment and Plan:     Hospital Problems as of 3/7/2020 Date Reviewed: 3/3/2017          Codes Class Noted - Resolved POA    PFO (patent foramen ovale) ICD-10-CM: Q21.1  ICD-9-CM: 745.5  12/17/2019 - Present Yes        Arrhythmia ICD-10-CM: I49.9  ICD-9-CM: 427.9  12/15/2019 - Present Yes        Hyperlipemia ICD-10-CM: E78.5  ICD-9-CM: 272.4  12/15/2019 - Present Yes        * (Principal) Acute embolic stroke [facility]) [id number]-34-[place]: I63.9  ICD-9-CM: 434.11  12/12/2019 - Present Yes        Hypertension (Chronic) ICD-10-CM: I10  ICD-9-CM: 401.9  Unknown - Present Yes        Type 2 diabetes mellitus (HCC) (Chronic) ICD-10-CM: E11.9  ICD-9-CM: 250.00  Unknown - Present Yes              A/P:    Acute embolic stroke  -MRI with acute infarcts in L cerebellar hemisphere, deep frontoparietal white matter, and R paramedian yariel. old lacunar infarcts. Continue aspirin and high-dose atorvastatin  -ISMAEL: 3 mm PFO, Cardiology stated he needs an evaluation for closure PFO with high risk features including significant (3 mm) separation of septum secundum and primum as well as large shunt. They recommend outpatient event monitoring 4 weeks and then schedule shunt closure if no dysrhythmia.     Hypertension:   Controlled with ACE inhibitor and metoprolol     Type 2 diabetes mellitus:    Lower dosage of metformin to 500 mg twice daily sugars 80s fasting.     Left upper extremity pain  Continue acetaminophen as needed     Allergies  As needed antihistamine diphenhydramine      Copied from prior note:DISPO: waiting for pt to be deemed \"disabled\" which in neurologic cases have to allow for 6 months showing chronicity.  Then he can get medicaid after that.   Medically stable for discharge.      DVT ppx:  lovenox     Signed:  Suyapa BEAVERS

## 2020-03-08 LAB
ANION GAP SERPL CALC-SCNC: 7 MMOL/L (ref 7–16)
BUN SERPL-MCNC: 14 MG/DL (ref 6–23)
CALCIUM SERPL-MCNC: 9.5 MG/DL (ref 8.3–10.4)
CHLORIDE SERPL-SCNC: 109 MMOL/L (ref 98–107)
CO2 SERPL-SCNC: 27 MMOL/L (ref 21–32)
CREAT SERPL-MCNC: 0.91 MG/DL (ref 0.8–1.5)
GLUCOSE SERPL-MCNC: 77 MG/DL (ref 65–100)
MAGNESIUM SERPL-MCNC: 1.4 MG/DL (ref 1.8–2.4)
POTASSIUM SERPL-SCNC: 3.7 MMOL/L (ref 3.5–5.1)
SODIUM SERPL-SCNC: 143 MMOL/L (ref 136–145)

## 2020-03-08 PROCEDURE — 74011250637 HC RX REV CODE- 250/637: Performed by: INTERNAL MEDICINE

## 2020-03-08 PROCEDURE — 74011250637 HC RX REV CODE- 250/637: Performed by: HOSPITALIST

## 2020-03-08 PROCEDURE — 65270000029 HC RM PRIVATE

## 2020-03-08 PROCEDURE — 74011250636 HC RX REV CODE- 250/636: Performed by: INTERNAL MEDICINE

## 2020-03-08 PROCEDURE — 80048 BASIC METABOLIC PNL TOTAL CA: CPT

## 2020-03-08 PROCEDURE — 36415 COLL VENOUS BLD VENIPUNCTURE: CPT

## 2020-03-08 PROCEDURE — 83735 ASSAY OF MAGNESIUM: CPT

## 2020-03-08 RX ORDER — MAGNESIUM SULFATE HEPTAHYDRATE 40 MG/ML
4 INJECTION, SOLUTION INTRAVENOUS ONCE
Status: COMPLETED | OUTPATIENT
Start: 2020-03-08 | End: 2020-03-08

## 2020-03-08 RX ORDER — LANOLIN ALCOHOL/MO/W.PET/CERES
400 CREAM (GRAM) TOPICAL DAILY
Status: DISCONTINUED | OUTPATIENT
Start: 2020-03-08 | End: 2020-03-09

## 2020-03-08 RX ADMIN — ENOXAPARIN SODIUM 40 MG: 40 INJECTION SUBCUTANEOUS at 13:06

## 2020-03-08 RX ADMIN — METFORMIN HYDROCHLORIDE 500 MG: 500 TABLET ORAL at 08:32

## 2020-03-08 RX ADMIN — Medication 400 MG: at 08:32

## 2020-03-08 RX ADMIN — ASPIRIN 81 MG 81 MG: 81 TABLET ORAL at 08:32

## 2020-03-08 RX ADMIN — METOPROLOL SUCCINATE 25 MG: 25 TABLET, FILM COATED, EXTENDED RELEASE ORAL at 08:32

## 2020-03-08 RX ADMIN — METFORMIN HYDROCHLORIDE 500 MG: 500 TABLET ORAL at 17:34

## 2020-03-08 RX ADMIN — GUAIFENESIN 100 MG: 100 SOLUTION ORAL at 21:32

## 2020-03-08 RX ADMIN — DIPHENHYDRAMINE HYDROCHLORIDE 25 MG: 25 CAPSULE ORAL at 17:34

## 2020-03-08 RX ADMIN — ACETAMINOPHEN 650 MG: 325 TABLET, FILM COATED ORAL at 21:33

## 2020-03-08 RX ADMIN — LISINOPRIL 40 MG: 20 TABLET ORAL at 08:32

## 2020-03-08 RX ADMIN — MAGNESIUM SULFATE 4 G: 4 INJECTION INTRAVENOUS at 08:39

## 2020-03-08 RX ADMIN — ATORVASTATIN CALCIUM 80 MG: 80 TABLET, FILM COATED ORAL at 21:33

## 2020-03-08 RX ADMIN — GUAIFENESIN 100 MG: 100 SOLUTION ORAL at 17:34

## 2020-03-08 RX ADMIN — ACETAMINOPHEN 650 MG: 325 TABLET, FILM COATED ORAL at 13:06

## 2020-03-08 NOTE — PROGRESS NOTES
Reviewed notes for spiritual concerns prior to visit  Visit with patient to build rapport with .   Calm  Encouraged with presence and words of hope      Vladimir Vieira,  Staff   C: [phone]  /  [email or web address]

## 2020-03-08 NOTE — PROGRESS NOTES
Problem: Patient Education: Go to Patient Education Activity  Goal: Patient/Family Education  Outcome: Progressing Towards Goal     Problem: Pressure Injury - Risk of  Goal: *Prevention of pressure injury  Description  Document Dewey Scale and appropriate interventions in the flowsheet. Outcome: Progressing Towards Goal  Note: Pressure Injury Interventions:  Sensory Interventions: Assess changes in LOC, Avoid rigorous massage over bony prominences    Moisture Interventions: Absorbent underpads, Check for incontinence Q2 hours and as needed    Activity Interventions: Increase time out of bed, Pressure redistribution bed/mattress(bed type), PT/OT evaluation    Mobility Interventions: HOB 30 degrees or less, Pressure redistribution bed/mattress (bed type), PT/OT evaluation    Nutrition Interventions: Document food/fluid/supplement intake, Offer support with meals,snacks and hydration    Friction and Shear Interventions: HOB 30 degrees or less                Problem: Patient Education: Go to Patient Education Activity  Goal: Patient/Family Education  Outcome: Progressing Towards Goal     Problem: Falls - Risk of  Goal: *Absence of Falls  Description  Document Jeanne Fall Risk and appropriate interventions in the flowsheet.   Outcome: Progressing Towards Goal  Note: Fall Risk Interventions:  Mobility Interventions: Bed/chair exit alarm, Communicate number of staff needed for ambulation/transfer, Patient to call before getting OOB    Mentation Interventions: Bed/chair exit alarm, Door open when patient unattended, Increase mobility, More frequent rounding, Reorient patient    Medication Interventions: Bed/chair exit alarm, Patient to call before getting OOB, Teach patient to arise slowly    Elimination Interventions: Bed/chair exit alarm, Call light in reach, Patient to call for help with toileting needs, Stay With Me (per policy), Toilet paper/wipes in reach, Toileting schedule/hourly rounds    History of Falls Interventions: Bed/chair exit alarm, Consult care management for discharge planning, Door open when patient unattended, Evaluate medications/consider consulting pharmacy, Investigate reason for fall         Problem: Patient Education: Go to Patient Education Activity  Goal: Patient/Family Education  Outcome: Progressing Towards Goal     Problem: Diabetes Self-Management  Goal: *Disease process and treatment process  Description  Define diabetes and identify own type of diabetes; list 3 options for treating diabetes. Outcome: Progressing Towards Goal  Goal: *Incorporating nutritional management into lifestyle  Description  Describe effect of type, amount and timing of food on blood glucose; list 3 methods for planning meals. Outcome: Progressing Towards Goal  Goal: *Incorporating physical activity into lifestyle  Description  State effect of exercise on blood glucose levels. Outcome: Progressing Towards Goal  Goal: *Developing strategies to promote health/change behavior  Description  Define the ABC's of diabetes; identify appropriate screenings, schedule and personal plan for screenings. Outcome: Progressing Towards Goal  Goal: *Using medications safely  Description  State effect of diabetes medications on diabetes; name diabetes medication taking, action and side effects. Outcome: Progressing Towards Goal  Goal: *Monitoring blood glucose, interpreting and using results  Description  Identify recommended blood glucose targets  and personal targets. Outcome: Progressing Towards Goal  Goal: *Prevention, detection, treatment of acute complications  Description  List symptoms of hyper- and hypoglycemia; describe how to treat low blood sugar and actions for lowering  high blood glucose level.   Outcome: Progressing Towards Goal  Goal: *Prevention, detection and treatment of chronic complications  Description  Define the natural course of diabetes and describe the relationship of blood glucose levels to long term complications of diabetes.   Outcome: Progressing Towards Goal  Goal: *Developing strategies to address psychosocial issues  Description  Describe feelings about living with diabetes; identify support needed and support network  Outcome: Progressing Towards Goal     Problem: Patient Education: Go to Patient Education Activity  Goal: Patient/Family Education  Outcome: Progressing Towards Goal     Problem: Patient Education: Go to Patient Education Activity  Goal: Patient/Family Education  Outcome: Progressing Towards Goal     Problem: Nutrition Deficit  Goal: *Optimize nutritional status  Outcome: Progressing Towards Goal     Problem: Patient Education: Go to Patient Education Activity  Goal: Patient/Family Education  Outcome: Progressing Towards Goal     Problem: General Medical Care Plan  Goal: *Vital signs within specified parameters  Outcome: Progressing Towards Goal  Goal: *Labs within defined limits  Outcome: Progressing Towards Goal  Goal: *Absence of infection signs and symptoms  Description  Wash hand more often   Outcome: Progressing Towards Goal  Goal: *Optimal pain control at patient's stated goal  Outcome: Progressing Towards Goal  Goal: *Skin integrity maintained  Outcome: Progressing Towards Goal  Goal: *Fluid volume balance  Outcome: Progressing Towards Goal  Goal: *Optimize nutritional status  Outcome: Progressing Towards Goal  Goal: *Anxiety reduced or absent  Outcome: Progressing Towards Goal  Goal: *Progressive mobility and function (eg: ADL's)  Outcome: Progressing Towards Goal     Problem: Patient Education: Go to Patient Education Activity  Goal: Patient/Family Education  Outcome: Progressing Towards Goal     Problem: Patient Education: Go to Patient Education Activity  Goal: Patient/Family Education  Outcome: Progressing Towards Goal     Problem: Patient Education: Go to Patient Education Activity  Goal: Patient/Family Education  Outcome: Progressing Towards Goal     Problem: Patient Education: Go to Patient Education Activity  Goal: Patient/Family Education  Outcome: Progressing Towards Goal     Problem: Ischemic Stroke: Discharge Outcomes  Goal: *Verbalizes anxiety and depression are reduced or absent  Outcome: Progressing Towards Goal  Goal: *Verbalize understanding of risk factor modification(Stroke Metric)  Outcome: Progressing Towards Goal  Goal: *Hemodynamically stable  Outcome: Progressing Towards Goal  Goal: *Absence of aspiration pneumonia  Outcome: Progressing Towards Goal  Goal: *Aware of needed dietary changes  Outcome: Progressing Towards Goal  Goal: *Verbalize understanding of prescribed medications including anti-coagulants, anti-lipid, and/or anti-platelets(Stroke Metric)  Outcome: Progressing Towards Goal  Goal: *Tolerating diet  Outcome: Progressing Towards Goal  Goal: *Aware of follow-up diagnostics related to anticoagulants  Outcome: Progressing Towards Goal  Goal: *Ability to perform ADLs and demonstrates progressive mobility and function  Outcome: Progressing Towards Goal  Goal: *Absence of DVT(Stroke Metric)  Outcome: Progressing Towards Goal  Goal: *Absence of aspiration  Outcome: Progressing Towards Goal  Goal: *Optimal pain control at patient's stated goal  Outcome: Progressing Towards Goal  Goal: *Home safety concerns addressed  Outcome: Progressing Towards Goal  Goal: *Describes available resources and support systems  Outcome: Progressing Towards Goal  Goal: *Verbalizes understanding of activation of EMS(911) for stroke symptoms(Stroke Metric)  Outcome: Progressing Towards Goal  Goal: *Understands and describes signs and symptoms to report to providers(Stroke Metric)  Outcome: Progressing Towards Goal  Goal: *Neurolgocially stable (absence of additional neurological deficits)  Outcome: Progressing Towards Goal  Goal: *Verbalizes importance of follow-up with primary care physician(Stroke Metric)  Outcome: Progressing Towards Goal  Goal: *Smoking cessation discussed,if applicable(Stroke Metric)  Outcome: Progressing Towards Goal  Goal: *Depression screening completed(Stroke Metric)  Outcome: Progressing Towards Goal     Problem: Pain  Goal: *Control of Pain  Outcome: Progressing Towards Goal     Problem: Patient Education: Go to Patient Education Activity  Goal: Patient/Family Education  Outcome: Progressing Towards Goal     Problem: Patient Education: Go to Patient Education Activity  Goal: Patient/Family Education  Outcome: Progressing Towards Goal

## 2020-03-09 LAB
GLUCOSE BLD STRIP.AUTO-MCNC: 82 MG/DL (ref 65–100)
MAGNESIUM SERPL-MCNC: 1.7 MG/DL (ref 1.8–2.4)

## 2020-03-09 PROCEDURE — 65270000029 HC RM PRIVATE

## 2020-03-09 PROCEDURE — 97530 THERAPEUTIC ACTIVITIES: CPT

## 2020-03-09 PROCEDURE — 36415 COLL VENOUS BLD VENIPUNCTURE: CPT

## 2020-03-09 PROCEDURE — 97116 GAIT TRAINING THERAPY: CPT

## 2020-03-09 PROCEDURE — 74011250637 HC RX REV CODE- 250/637: Performed by: INTERNAL MEDICINE

## 2020-03-09 PROCEDURE — 82962 GLUCOSE BLOOD TEST: CPT

## 2020-03-09 PROCEDURE — 74011250636 HC RX REV CODE- 250/636: Performed by: INTERNAL MEDICINE

## 2020-03-09 PROCEDURE — 83735 ASSAY OF MAGNESIUM: CPT

## 2020-03-09 PROCEDURE — 97164 PT RE-EVAL EST PLAN CARE: CPT

## 2020-03-09 PROCEDURE — 97112 NEUROMUSCULAR REEDUCATION: CPT

## 2020-03-09 PROCEDURE — 97110 THERAPEUTIC EXERCISES: CPT

## 2020-03-09 PROCEDURE — 74011250637 HC RX REV CODE- 250/637: Performed by: HOSPITALIST

## 2020-03-09 RX ORDER — LANOLIN ALCOHOL/MO/W.PET/CERES
400 CREAM (GRAM) TOPICAL 2 TIMES DAILY
Status: DISCONTINUED | OUTPATIENT
Start: 2020-03-09 | End: 2020-03-24

## 2020-03-09 RX ADMIN — METFORMIN HYDROCHLORIDE 500 MG: 500 TABLET ORAL at 08:36

## 2020-03-09 RX ADMIN — ASPIRIN 81 MG 81 MG: 81 TABLET ORAL at 08:36

## 2020-03-09 RX ADMIN — DIPHENHYDRAMINE HYDROCHLORIDE 25 MG: 25 CAPSULE ORAL at 17:50

## 2020-03-09 RX ADMIN — METOPROLOL SUCCINATE 25 MG: 25 TABLET, FILM COATED, EXTENDED RELEASE ORAL at 08:36

## 2020-03-09 RX ADMIN — ACETAMINOPHEN 650 MG: 325 TABLET, FILM COATED ORAL at 14:22

## 2020-03-09 RX ADMIN — GUAIFENESIN 100 MG: 100 SOLUTION ORAL at 17:50

## 2020-03-09 RX ADMIN — Medication 400 MG: at 08:36

## 2020-03-09 RX ADMIN — ATORVASTATIN CALCIUM 80 MG: 80 TABLET, FILM COATED ORAL at 22:06

## 2020-03-09 RX ADMIN — ACETAMINOPHEN 650 MG: 325 TABLET, FILM COATED ORAL at 22:06

## 2020-03-09 RX ADMIN — Medication 400 MG: at 17:50

## 2020-03-09 RX ADMIN — ACETAMINOPHEN 650 MG: 325 TABLET, FILM COATED ORAL at 05:15

## 2020-03-09 RX ADMIN — ENOXAPARIN SODIUM 40 MG: 40 INJECTION SUBCUTANEOUS at 14:22

## 2020-03-09 RX ADMIN — GUAIFENESIN 100 MG: 100 SOLUTION ORAL at 05:15

## 2020-03-09 RX ADMIN — LISINOPRIL 40 MG: 20 TABLET ORAL at 08:36

## 2020-03-09 NOTE — PROGRESS NOTES
Problem: Self Care Deficits Care Plan (Adult)  Goal: *Acute Goals and Plan of Care (Insert Text)  Description  GOALS UPDATED : 3/3/2020   (Met, 3/3/2020)  2. Patient will demonstrate appropriate safety awareness and protection of L UE during bed mobility and functional transfers with minimal cues. (Progressing, still needs cues, 3/3/2020)  3. Patient will complete total body bathing and dressing with moderate assistance and adaptive equipment as needed. (Progressing, UB bathing Min A, UB dressing at Mod A, LB dressing at Dep, 3/3/2020)  4. Patient will complete weightbearing into the L UE with ADL tasks with minimal assistance to improve ability to use as a functional assist during ADL tasks. (Progressing,3/3/2020)5. Patient will demonstrate L UE SROM HEP within 7 days. Progressing 3/3/2020  8. (Goal Met)  9. Pt will complete bed mobility at mod I. New goal  10. Pt will complete dynamic sitting balance for ADLs at mod I with good balance. New goal  11. Pt will complete functional transfers at [facility] to [place] [id number] with equipment as needed. New goal  12. Pt will complete grooming tasks in standing at sink level x5 mins with good balance and equipment as needed.  New goal    Outcome: Progressing Towards Goal     OCCUPATIONAL THERAPY: Daily Note and PM    3/9/2020  INPATIENT: OT Visit Days: 3  Payor: MEDICAID PENDING / Plan: William Garcia PENDING / Product Type: Medicaid /      NAME/AGE/GENDER: Gifty Johnson is a 53 y.o. male   PRIMARY DIAGNOSIS:  Acute ischemic stroke ([address] [I63.9]  Cerebrovascular accident (CVA) due to embolism ([address] [J30.9] Acute embolic stroke ([address] Acute embolic stroke ([address]       ICD-10: Treatment Diagnosis:    · Generalized Muscle Weakness (M62.81)  · Other lack of cordination (R27.8)  · Hemiplegia and hemiparesis following cerebral infarction affecting   · left non-dominant side (I69.354)  · Abnormal posture (R29.3)   Precautions/Allergies:    NO PULLING ON LUE   LUE in sling with shoulder joint approximated and supported, needs taping   Patient has no known allergies. ASSESSMENT:     Mr. Krys Grimaldo presents in supine upon arrival. Pt transferred to the edge of the bed with min a and participated in exercises listed below. Pt continues to struggle with completing SROM and appears to have less active movement in his L UE compared to the last time this therapist saw this pt. Pt returned to supine with CGA. Fair effort. Continue POC. This section established at most recent assessment   PROBLEM LIST (Impairments causing functional limitations):  1. Decreased Strength  2. Decreased ADL/Functional Activities  3. Decreased Transfer Abilities  4. Decreased Ambulation Ability/Technique  5. Decreased Balance  6. Decreased Activity Tolerance  7. Increased Fatigue  8. Decreased Flexibility/Joint Mobility  9. Decreased Knowledge of Precautions  10. Decreased [place] with Home Exercise Program   INTERVENTIONS PLANNED: (Benefits and precautions of occupational therapy have been discussed with the patient.)  1. Activities of daily living training  2. Adaptive equipment training  3. Balance training  4. Clothing management  5. Cognitive training  6. Donning&doffing training  7. Keanu tech training  8. Neuromuscular re-eduation  9. Therapeutic activity  10. Therapeutic exercise     TREATMENT PLAN: Frequency/Duration: Follow patient 3 times per week to address above goals. Rehabilitation Potential For Stated Goals: Excellent     REHAB RECOMMENDATIONS (at time of discharge pending progress):    Placement: It is my opinion, based on this patient's performance to date, that Mr. Krys Grimaldo may benefit from intensive therapy at an [address] [place] [address] after discharge due to potential to make ongoing and sustainable functional improvement that is of practical value. Cora Mohan Pt functioning far below independent baseline, demonstrating good improvement and participation.  Pt would likely benefit greatly and increase independence from inpatient rehab stay. Equipment:    TBD               OCCUPATIONAL PROFILE AND HISTORY:   History of Present Injury/Illness (Reason for Referral):  See H&P  Past Medical History/Comorbidities:   Mr. Sebastián Ramos  has a past medical history of Acute ischemic stroke ([address] (12/12/2019), Acute pancreatitis (11/19/2014), Cerebrovascular accident (CVA) due to embolism ([address] (12/12/2019), Diabetes ([address] (2002), Diabetes ([address] Diabetes mellitus, and Hypertension. He also has no past medical history of Arthritis, Asthma, Autoimmune disease ([address] CAD (coronary artery disease), Cancer ([address] Chronic kidney disease, COPD, Dementia, Dementia ([address] Heart failure ([address] Ill-defined condition, Infectious disease, Liver disease, Other ill-defined conditions(799.89), Psychiatric disorder, PUD (peptic ulcer disease), Seizures ([address] or Sleep disorder. Mr. Sebastián Ramos  has a past surgical history that includes hx hernia repair and hx orthopaedic. Social History/Living Environment:   Home Environment: Apartment  # Steps to Enter: 12  Rails to Enter: Yes  [employer] : Bilateral  One/Two Story Residence: One story  Living Alone: No  Support Systems: Spouse/Significant Other/Partner  Patient Expects to be Discharged to[de-identified] Unknown  Current DME Used/Available at Home: None  Tub or Shower Type: Tub/Shower combination  Prior Level of Function/Work/Activity:  Pt lives at home with his wife. Pt is typically independent with ADL/functional mobility. Pt does not drive. Pt was working part-time at [employer]. Personal Factors:          Age:  53 y.o.         Past/Current Experience:  CVA with flaccid L side        Other factors that influence how disability is experienced by the patient:  multiple co-morbidities    Number of Personal Factors/Comorbidities that affect the Plan of Care: Extensive review of physical, cognitive, and psychosocial performance (3+):  HIGH COMPLEXITY   ASSESSMENT OF OCCUPATIONAL PERFORMANCE[de-identified]   Activities of Daily Living:   Basic ADLs (From Assessment) Complex ADLs (From Assessment)   Feeding: Setup, Additional time  Oral Facial Hygiene/Grooming: Moderate assistance  Bathing: Moderate assistance  Upper Body Dressing: Maximum assistance  Lower Body Dressing: Maximum assistance  Toileting: Maximum assistance, Additional time, Adaptive equipment(wearing brief) Instrumental ADL  Meal Preparation: Total assistance  Homemaking: Total assistance  Medication Management: Total assistance  Financial Management: Total assistance   Grooming/Bathing/Dressing Activities of Daily Living         Upper Body Bathing  Bathing Assistance: Min A   Position Performed: Seated in chair                  Lower Body Dressing Assistance  Socks: Total assistance (dependent) Bed/Mat Mobility  Rolling: Modified independent  Supine to Sit: Minimum assistance  Sit to Supine: Contact guard assistance  Sit to Stand: Stand-by assistance  Stand to Sit: Stand-by assistance  Scooting: Stand-by assistance     Most Recent Physical Functioning:   Gross Assessment:                  Posture:     Balance:  Sitting: Intact  Sitting - Static: Good (unsupported)  Sitting - Dynamic: Good (unsupported)  Standing: Impaired  Standing - Static: Fair  Standing - Dynamic : Fair Bed Mobility:  Rolling: Modified independent  Supine to Sit: Minimum assistance  Sit to Supine: Contact guard assistance  Scooting: Stand-by assistance  Interventions: Verbal cues  Wheelchair Mobility:     Transfers:  Sit to Stand: Stand-by assistance  Stand to Sit: Stand-by assistance  Stand Pivot Transfers: Contact guard assistance  Interventions: Safety awareness training; Tactile cues; Verbal cues            Patient Vitals for the past 6 hrs:   BP BP Patient Position SpO2 Pulse   03/09/20 1200 116/75 At rest 97 % 63       Mental Status  Neurologic State: Alert  Orientation Level: Oriented X4  Cognition: Follows commands  Perception: Verbal, Cues to attend to left side of body, Cues to attend left visual field, Visual, Cues to maintain midline in sitting, Cues to maintain midline in standing, Tactile  Perseveration: No perseveration noted  Safety/Judgement: Fall prevention, Decreased awareness of need for assistance, Decreased awareness of need for safety, Decreased awareness of environment, Decreased insight into deficits                          Physical Skills Involved:  1. Range of Motion  2. Balance  3. Strength  4. Activity Tolerance  5. Fine Motor Control  6. Gross Motor Control Cognitive Skills Affected (resulting in the inability to perform in a timely and safe manner):  1. Perception  2. Expression Psychosocial Skills Affected:  1. Habits/Routines  2. Environmental Adaptation  3. Social Interaction  4. Emotional Regulation  5. Self-Awareness  6. Awareness of Others  7. Social Roles   Number of elements that affect the Plan of Care: 5+:  HIGH COMPLEXITY   CLINICAL DECISION MAKIN[address] [place] [id number] AM-PAC 6 Clicks   Daily Activity Inpatient Short Form  How much help from another person does the patient currently need. .. Total A Lot A Little None   1. Putting on and taking off regular lower body clothing? [x] 1   [] 2   [] 3   [] 4   2. Bathing (including washing, rinsing, drying)? [] 1   [x] 2   [] 3   [] 4   3. Toileting, which includes using toilet, bedpan or urinal?   [] 1   [x] 2   [] 3   [] 4   4. Putting on and taking off regular upper body clothing? [] 1   [x] 2   [] 3   [] 4   5. Taking care of personal grooming such as brushing teeth? [] 1   [x] 2   [] 3   [] 4   6. Eating meals? [] 1   [] 2   [x] 3   [] 4   © , Trustees of [address] [place] [id number], under license to [employer]. All rights reserved      Score:  Initial: 11 Most Recent: 12 (Date: 3/3/2020 ),     Interpretation of Tool:  Represents activities that are increasingly more difficult (i.e. Bed mobility, Transfers, Gait).     Medical Necessity:     · Patient demonstrates   · good and excellent  ·  rehab potential due to higher previous functional level. Reason for Services/Other Comments:  · Patient continues to require skilled intervention due to   · Decreased independence with ADL/functional transfers that impacts overall quality of life. · .   Use of outcome tool(s) and clinical judgement create a POC that gives a: MODERATE COMPLEXITY         TREATMENT:   (In addition to Assessment/Re-Assessment sessions the following treatments were rendered)     Pre-treatment Symptoms/Complaints:  None  Pain: Initial:   Pain Intensity 1: 0  Pain Location 1: Shoulder  Pain Orientation 1: Left  Pain Intervention(s) 1: Exercise, Repositioned  Post Session: no complaint of pain   Therapeutic Exercise: (19 minutes):  Exercises per grid below to improve mobility and strength. Required maximal verbal and manual cues to promote proper body posture and promote proper body mechanics. Progressed range and repetitions as indicated. Therapeutic Activity: (10 minutes): Therapeutic activities including Bed transfers and standing to improve mobility, strength and balance. Required minimal assist to promote static balance in standing. Date:  3/9/2020 Date:   Date:        Activity/Exercise Parameters Parameters Parameters     LUE finger flexion, AROM gravity minimized then AAROM to complete ROM 2x10 reps       LUE finger extension AAROM 2x10 reps       LUE wrist extension, AROM gravity minimized then AAROM to complete ROM 1x10 reps       LUE wrist extension, AROM against gravity  1x10 reps       LUE wrist flexion AAROM 2x10 reps       LUE elbow flexion/extension PROM x10 reps       LUE shoulder flexion PROM x10 reps       L UE pronation/supination PROM                             Braces/Orthotics/Lines/Etc:   · O2 device: Room air  · Treatment/Session Assessment:    · Response to Treatment:  Pt demonstrated good participation, making progress, L inattention, needing cues.    · Interdisciplinary Collaboration:   o Certified Occupational Therapy Assistant  o Registered Nurse  · After treatment position/precautions:   o Supine in bed  o Bed alarm/tab alert on  o Bed/Chair-wheels locked  o Bed in low position  o Call light within reach  o RN notified  o Side rails x 3   · Compliance with Program/Exercises: Compliant all of the time, Will assess as treatment progresses. · Recommendations/Intent for next treatment session: \"Next visit will focus on advancements to more challenging activities and reduction in assistance provided\".   Total Treatment Duration:    OT Patient Time In/Time Out  Time In: 1515  Time Out: [facility]

## 2020-03-09 NOTE — PROGRESS NOTES
Problem: Patient Education: Go to Patient Education Activity  Goal: Patient/Family Education  Outcome: Progressing Towards Goal     Problem: Pressure Injury - Risk of  Goal: *Prevention of pressure injury  Description  Document Dewey Scale and appropriate interventions in the flowsheet. Outcome: Progressing Towards Goal  Note: Pressure Injury Interventions:  Sensory Interventions: Assess changes in LOC, Avoid rigorous massage over bony prominences    Moisture Interventions: Absorbent underpads, Check for incontinence Q2 hours and as needed    Activity Interventions: Increase time out of bed, Pressure redistribution bed/mattress(bed type), PT/OT evaluation    Mobility Interventions: HOB 30 degrees or less, Pressure redistribution bed/mattress (bed type), PT/OT evaluation    Nutrition Interventions: Document food/fluid/supplement intake, Offer support with meals,snacks and hydration    Friction and Shear Interventions: HOB 30 degrees or less                Problem: Patient Education: Go to Patient Education Activity  Goal: Patient/Family Education  Outcome: Progressing Towards Goal     Problem: Falls - Risk of  Goal: *Absence of Falls  Description  Document Jeanne Fall Risk and appropriate interventions in the flowsheet.   Outcome: Progressing Towards Goal  Note: Fall Risk Interventions:  Mobility Interventions: Bed/chair exit alarm, Communicate number of staff needed for ambulation/transfer, Patient to call before getting OOB    Mentation Interventions: Bed/chair exit alarm, Door open when patient unattended, Increase mobility, More frequent rounding    Medication Interventions: Bed/chair exit alarm, Patient to call before getting OOB, Teach patient to arise slowly    Elimination Interventions: Bed/chair exit alarm, Call light in reach, Stay With Me (per policy), Patient to call for help with toileting needs, Toilet paper/wipes in reach, Toileting schedule/hourly rounds    History of Falls Interventions: Bed/chair exit alarm, Consult care management for discharge planning, Door open when patient unattended, Investigate reason for fall         Problem: Patient Education: Go to Patient Education Activity  Goal: Patient/Family Education  Outcome: Progressing Towards Goal     Problem: Diabetes Self-Management  Goal: *Disease process and treatment process  Description  Define diabetes and identify own type of diabetes; list 3 options for treating diabetes. Outcome: Progressing Towards Goal  Goal: *Incorporating nutritional management into lifestyle  Description  Describe effect of type, amount and timing of food on blood glucose; list 3 methods for planning meals. Outcome: Progressing Towards Goal  Goal: *Incorporating physical activity into lifestyle  Description  State effect of exercise on blood glucose levels. Outcome: Progressing Towards Goal  Goal: *Developing strategies to promote health/change behavior  Description  Define the ABC's of diabetes; identify appropriate screenings, schedule and personal plan for screenings. Outcome: Progressing Towards Goal  Goal: *Using medications safely  Description  State effect of diabetes medications on diabetes; name diabetes medication taking, action and side effects. Outcome: Progressing Towards Goal  Goal: *Monitoring blood glucose, interpreting and using results  Description  Identify recommended blood glucose targets  and personal targets. Outcome: Progressing Towards Goal  Goal: *Prevention, detection, treatment of acute complications  Description  List symptoms of hyper- and hypoglycemia; describe how to treat low blood sugar and actions for lowering  high blood glucose level. Outcome: Progressing Towards Goal  Goal: *Prevention, detection and treatment of chronic complications  Description  Define the natural course of diabetes and describe the relationship of blood glucose levels to long term complications of diabetes.   Outcome: Progressing Towards Goal  Goal: *Developing strategies to address psychosocial issues  Description  Describe feelings about living with diabetes; identify support needed and support network  Outcome: Progressing Towards Goal     Problem: Patient Education: Go to Patient Education Activity  Goal: Patient/Family Education  Outcome: Progressing Towards Goal     Problem: Patient Education: Go to Patient Education Activity  Goal: Patient/Family Education  Outcome: Progressing Towards Goal     Problem: Nutrition Deficit  Goal: *Optimize nutritional status  Outcome: Progressing Towards Goal     Problem: Patient Education: Go to Patient Education Activity  Goal: Patient/Family Education  Outcome: Progressing Towards Goal     Problem: General Medical Care Plan  Goal: *Vital signs within specified parameters  Outcome: Progressing Towards Goal  Goal: *Labs within defined limits  Outcome: Progressing Towards Goal  Goal: *Absence of infection signs and symptoms  Description  Wash hand more often   Outcome: Progressing Towards Goal  Goal: *Optimal pain control at patient's stated goal  Outcome: Progressing Towards Goal  Goal: *Skin integrity maintained  Outcome: Progressing Towards Goal  Goal: *Fluid volume balance  Outcome: Progressing Towards Goal  Goal: *Optimize nutritional status  Outcome: Progressing Towards Goal  Goal: *Anxiety reduced or absent  Outcome: Progressing Towards Goal  Goal: *Progressive mobility and function (eg: ADL's)  Outcome: Progressing Towards Goal     Problem: Patient Education: Go to Patient Education Activity  Goal: Patient/Family Education  Outcome: Progressing Towards Goal     Problem: Patient Education: Go to Patient Education Activity  Goal: Patient/Family Education  Outcome: Progressing Towards Goal     Problem: Patient Education: Go to Patient Education Activity  Goal: Patient/Family Education  Outcome: Progressing Towards Goal     Problem: Patient Education: Go to Patient Education Activity  Goal: Patient/Family Education  Outcome: Progressing Towards Goal     Problem: Ischemic Stroke: Discharge Outcomes  Goal: *Verbalizes anxiety and depression are reduced or absent  Outcome: Progressing Towards Goal  Goal: *Verbalize understanding of risk factor modification(Stroke Metric)  Outcome: Progressing Towards Goal  Goal: *Hemodynamically stable  Outcome: Progressing Towards Goal  Goal: *Absence of aspiration pneumonia  Outcome: Progressing Towards Goal  Goal: *Aware of needed dietary changes  Outcome: Progressing Towards Goal  Goal: *Verbalize understanding of prescribed medications including anti-coagulants, anti-lipid, and/or anti-platelets(Stroke Metric)  Outcome: Progressing Towards Goal  Goal: *Tolerating diet  Outcome: Progressing Towards Goal  Goal: *Aware of follow-up diagnostics related to anticoagulants  Outcome: Progressing Towards Goal  Goal: *Ability to perform ADLs and demonstrates progressive mobility and function  Outcome: Progressing Towards Goal  Goal: *Absence of DVT(Stroke Metric)  Outcome: Progressing Towards Goal  Goal: *Absence of aspiration  Outcome: Progressing Towards Goal  Goal: *Optimal pain control at patient's stated goal  Outcome: Progressing Towards Goal  Goal: *Home safety concerns addressed  Outcome: Progressing Towards Goal  Goal: *Describes available resources and support systems  Outcome: Progressing Towards Goal  Goal: *Verbalizes understanding of activation of EMS(911) for stroke symptoms(Stroke Metric)  Outcome: Progressing Towards Goal  Goal: *Understands and describes signs and symptoms to report to providers(Stroke Metric)  Outcome: Progressing Towards Goal  Goal: *Neurolgocially stable (absence of additional neurological deficits)  Outcome: Progressing Towards Goal  Goal: *Verbalizes importance of follow-up with primary care physician(Stroke Metric)  Outcome: Progressing Towards Goal  Goal: *Smoking cessation discussed,if applicable(Stroke Metric)  Outcome: Progressing Towards Goal  Goal: *Depression screening completed(Stroke Metric)  Outcome: Progressing Towards Goal     Problem: Pain  Goal: *Control of Pain  Outcome: Progressing Towards Goal     Problem: Patient Education: Go to Patient Education Activity  Goal: Patient/Family Education  Outcome: Progressing Towards Goal     Problem: Patient Education: Go to Patient Education Activity  Goal: Patient/Family Education  Outcome: Progressing Towards Goal

## 2020-03-09 NOTE — PROGRESS NOTES
Problem: Pressure Injury - Risk of  Goal: *Prevention of pressure injury  Description  Document Dewey Scale and appropriate interventions in the flowsheet. Outcome: Progressing Towards Goal  Note: Pressure Injury Interventions:  Sensory Interventions: Assess changes in LOC, Discuss PT/OT consult with provider, Float heels, Keep linens dry and wrinkle-free    Moisture Interventions: Absorbent underpads, Check for incontinence Q2 hours and as needed, Limit adult briefs, Maintain skin hydration (lotion/cream)    Activity Interventions: Increase time out of bed, Pressure redistribution bed/mattress(bed type), PT/OT evaluation    Mobility Interventions: Float heels, HOB 30 degrees or less, Pressure redistribution bed/mattress (bed type), PT/OT evaluation    Nutrition Interventions: Document food/fluid/supplement intake, Discuss nutritional consult with provider, Offer support with meals,snacks and hydration    Friction and Shear Interventions: HOB 30 degrees or less, Lift sheet                Problem: Falls - Risk of  Goal: *Absence of Falls  Description  Document Jeanne Fall Risk and appropriate interventions in the flowsheet.   Outcome: Progressing Towards Goal  Note: Fall Risk Interventions:  Mobility Interventions: Bed/chair exit alarm, Communicate number of staff needed for ambulation/transfer, OT consult for ADLs, Patient to call before getting OOB, PT Consult for mobility concerns    Mentation Interventions: Adequate sleep, hydration, pain control, Bed/chair exit alarm, Toileting rounds, Update white board    Medication Interventions: Bed/chair exit alarm, Evaluate medications/consider consulting pharmacy, Patient to call before getting OOB, Teach patient to arise slowly    Elimination Interventions: Bed/chair exit alarm, Call light in reach, Patient to call for help with toileting needs, Toileting schedule/hourly rounds    History of Falls Interventions: Bed/chair exit alarm, Consult care management for discharge planning, Door open when patient unattended, Evaluate medications/consider consulting pharmacy         Problem: General Medical Care Plan  Goal: *Vital signs within specified parameters  Outcome: Progressing Towards Goal  Goal: *Optimal pain control at patient's stated goal  Outcome: Progressing Towards Goal     Problem: Ischemic Stroke: Discharge Outcomes  Goal: *Tolerating diet  Outcome: Progressing Towards Goal  Goal: *Absence of DVT(Stroke Metric)  Outcome: Progressing Towards Goal  Goal: *Absence of aspiration  Outcome: Progressing Towards Goal  Goal: *Optimal pain control at patient's stated goal  Outcome: Progressing Towards Goal  Goal: *Understands and describes signs and symptoms to report to providers(Stroke Metric)  Outcome: Progressing Towards Goal     Problem: Pain  Goal: *Control of Pain  Outcome: Progressing Towards Goal

## 2020-03-09 NOTE — PROGRESS NOTES
Problem: Mobility Impaired (Adult and Pediatric)  Goal: *Acute Goals and Plan of Care  Description  GOALS UPDATED ON RE-ASSESSMENT 3/9/2020 (advancements to goals in bold):  1. Patient will perform bed mobility with supervision and 0 verbal cues within 7 treatment days. 2. Patient will perform transfer bed to chair with SUPERVISION within 7 treatment days. 3. Patient will demonstrate fair+ dynamic balance throughout stance phase on L LE within 7 treatment days. 4. Patient will require CGA throughout swing phase on (to advance) L LE within 7 treatment days. 5. Patient demonstrate 3+/5 strength in L LE hip flexion, hip abduction, and hip adduction within 7 treatment days. 6. Patient will ambulate 150ft+ with CGA and least retrictive assistive device within 7 treatment days. 7. Patient will perform wheelchair mobility x75ft with SUPERVISION within 7 treatment days. PREVIOUSLY MET GOALS:  2. Patient will perform STS transfer with CGA within 7 treatment days. GOAL MET : 3/3/20  3. Patient will perform bed to (wheel) chair transfer with CGA with 7 treatment days. GOAL MET: 3/4/20  PHYSICAL THERAPY: Daily Note, Re-evaluation and AM 3/9/2020  INPATIENT: PT Visit Days : 1  Payor: MEDICAID PENDING / Plan: Keri Rizvi PENDING / Product Type: Medicaid /       NAME/AGE/GENDER: Cristina Enriquez is a 53 y.o. male   PRIMARY DIAGNOSIS: Acute ischemic stroke ([address] [I63.9]  Cerebrovascular accident (CVA) due to embolism ([address] [G62.4] Acute embolic stroke ([address] Acute embolic stroke ([address]       ICD-10: Treatment Diagnosis:   · Difficulty in walking, Not elsewhere classified (R26.2)  · Hemiplegia and hemiparesis following cerebral infarction affecting left non-dominant side (S77.989)   Precaution/Allergies:  Patient has no known allergies. ASSESSMENT:     Mr. Ellis Nair is a 53year old admitted R MCA CVA. Patient seen this AM for physical therapy re-evaluation to address progression toward acute PT goals.  Since last re-assessment, patient has met 3/8 goals and progressing in remaining 5 goals requiring upgrade on this re-assessment. Patient continues to demonstrate improved L LE strength with improved quad and glute activation appreciated today. Activation in distal L UE as well as horizontal abduction throughout functional mobility appreciated as well (refer to OT note for UE specifics) Currently requiring supervision and verbal cues for bed mobility, SBA with scooting, SBA with sit to stand and stand to sit transfers, SBA-CGA with transfer bed to chair, SBA-CGA with wheelchair mobility for modified household level distance, and ambulation x60ft+ with minimal assistance for L LE mechanics. Patient continues to make significant gains in functional mobility evidenced by meeting goals and progression on [place] [facility]-Pac Short Form. Recommend inpatient rehabilitation at discharge. Patient uninsured; making placement challenging. Today's treatment session addressed positioning, bed mobility, transfer training, wheelchair transfers, postural retraining, dynamic standing balance activity with glute and quad activation with NDT facilitation techniques,  gait training with L LE facilitation with railing and with hemiwalker, wheelchair mobility, wheelchair management, functional mobility in community with wheel chair, and navigation of environment with dual tasking. This section established at most recent assessment   PROBLEM LIST (Impairments causing functional limitations):  1. Decreased Strength  2. Decreased ADL/Functional Activities  3. Decreased Transfer Abilities  4. Decreased Ambulation Ability/Technique  5. Decreased Balance  6. Increased Pain  7. Decreased Activity Tolerance  8. Increased Fatigue  9. Decreased Flexibility/Joint Mobility   INTERVENTIONS PLANNED: (Benefits and precautions of physical therapy have been discussed with the patient.)  1. Balance Exercise  2. Bed Mobility  3. Family Education  4.  Gait Training  5. Home Exercise Program (HEP)  6. Manual Therapy  7. Neuromuscular Re-education/Strengthening  8. Range of Motion (ROM)  9. Therapeutic Activites  10. Therapeutic Exercise/Strengthening  11. Transfer Training     TREATMENT PLAN: Frequency/Duration: 3 times a week for duration of hospital stay  Rehabilitation Potential For Stated Goals: Good     REHAB RECOMMENDATIONS (at time of discharge pending progress):    Placement: It is my opinion, based on this patient's performance to date, that Mr. Jonathon Smith may benefit from intensive therapy at an [address] [place] [address] after discharge due to a probable need for close medical supervision by a rehab physician, a probable need for multiple therapy disciplines and potential to make ongoing and sustainable functional improvement that is of practical value. .  Equipment:    TBD pending progress with therapy. HISTORY:   History of Present Injury/Illness (Reason for Referral):  Per H&P: \"Pt is a 52 y/o smoker with DM, HTN, who presented to ER with L leg and arm weakness, L facial droop, dysarthria. First noted L leg weakness late night 12/11 when he woke up to go to the bathroom. He was normal when he went to bed around 1030. Woke up this morning and had persistent weakness L leg and also now noted in L arm. EMS called. Noted to have slurred speech and L facial droop as well. Code S called in ER around 9am.  MRI with acute infarcts in L cerebellar hemisphere, deep frontoparietal white matter, and R paramedian yariel. Also noted old lacunar infarcts. No large vessel occlusion on CTA, but some stenosis noted. No hx afib, TIA, CVA. No CP, palpitations, SOB. \"  Past Medical History/Comorbidities:   Mr. Jonathon Smith  has a past medical history of Acute ischemic stroke ([address] (12/12/2019), Acute pancreatitis (11/19/2014), Cerebrovascular accident (CVA) due to embolism ([address] (12/12/2019), Diabetes ([address] (2002), Diabetes ([address] Diabetes mellitus, and Hypertension. He also has no past medical history of Arthritis, Asthma, Autoimmune disease ([address] CAD (coronary artery disease), Cancer ([address] Chronic kidney disease, COPD, Dementia, Dementia ([address] Heart failure ([facility] Ill-defined condition, Infectious disease, Liver disease, Other ill-defined conditions(799.89), Psychiatric disorder, PUD (peptic ulcer disease), Seizures ([facility] or Sleep disorder. Mr. Naun Hampton  has a past surgical history that includes hx hernia repair and hx orthopaedic. Social History/Living Environment:   Home Environment: Apartment  # Steps to Enter: 12  Rails to Enter: Yes  [employer] : Bilateral  One/Two Story Residence: One story  Living Alone: No  Support Systems: Spouse/Significant Other/Partner  Patient Expects to be Discharged to[de-identified] Unknown  Current DME Used/Available at Home: None  Tub or Shower Type: Tub/Shower combination  Prior Level of Function/Work/Activity:  Independent, lives with wife in 2nd story 1 level apartment. No recent falls. Number of Personal Factors/Comorbidities that affect the Plan of Care: 1-2: MODERATE COMPLEXITY   EXAMINATION:   Most Recent Physical Functioning:   Gross Assessment:  AROM: Generally decreased, functional  PROM: Within functional limits  Strength: Generally decreased, functional  Coordination: Generally decreased, functional  Tone: Abnormal  Sensation: Intact               Posture:     Balance:  Sitting: Intact  Sitting - Static: Good (unsupported)  Sitting - Dynamic: Good (unsupported)  Standing: Impaired  Standing - Static: Fair  Standing - Dynamic : Fair Bed Mobility:  Rolling: Supervision  Supine to Sit: Supervision  Sit to Supine: Supervision  Scooting: Stand-by assistance  Interventions: Verbal cues  Wheelchair Mobility:     Transfers:  Sit to Stand: Stand-by assistance  Stand to Sit: Stand-by assistance;Contact guard assistance  Stand Pivot Transfers: Contact guard assistance  Interventions: Safety awareness training; Tactile cues; Verbal cues  Gait:     Base of Support: Shift to left  Gait Abnormalities: Hemiplegic  Distance (ft): 40 Feet (ft)(x20 with railing; x20 with luke walker)  Assistive Device: Walker luke;Gait belt  Ambulation - Level of Assistance: Minimal assistance(WC follow for safety)      Body Structures Involved:  1. Nerves  2. Voice/Speech  3. Bones  4. Joints  5. Muscles Body Functions Affected:  1. Mental  2. Sensory/Pain  3. Neuromusculoskeletal  4. Movement Related Activities and Participation Affected:  1. General Tasks and Demands  2. Mobility  3. Self Care  4. Interpersonal Interactions and Relationships   Number of elements that affect the Plan of Care: 4+: HIGH COMPLEXITY   CLINICAL PRESENTATION:   Presentation: Evolving clinical presentation with changing clinical characteristics: MODERATE COMPLEXITY   CLINICAL DECISION MAKIN [facility] Short Form  How much difficulty does the patient currently have. .. Unable A Lot A Little None   1. Turning over in bed (including adjusting bedclothes, sheets and blankets)? [] 1   [] 2   [] 3   [x] 4   2. Sitting down on and standing up from a chair with arms ( e.g., wheelchair, bedside commode, etc.)   [] 1   [] 2   [x] 3   [] 4   3. Moving from lying on back to sitting on the side of the bed? [] 1   [] 2   [x] 3   [] 4   How much help from another person does the patient currently need. .. Total A Lot A Little None   4. Moving to and from a bed to a chair (including a wheelchair)? [] 1   [] 2   [x] 3   [] 4   5. Need to walk in hospital room? [] 1   [] 2   [x] 3   [] 4   6. Climbing 3-5 steps with a railing? [] 1   [x] 2   [] 3   [] 4   © , Trustees of [address] [place] [address] [address] [id number], under license to [employer]. All rights reserved      Score:  Initial: 13 Most Recent: 18 (Date:3/9/2020)    Interpretation of Tool:  Represents activities that are increasingly more difficult (i.e. Bed mobility, Transfers, Gait).     Medical Necessity:     · Patient is expected to demonstrate progress in   · strength, range of motion, balance, coordination, and functional technique  ·  to   · increase independence with all mobility. · .  Reason for Services/Other Comments:  · Patient continues to require skilled intervention due to   · medical complications and mobility deficits which impact his level of function, safety, and independence as indicated above. · .   Use of outcome tool(s) and clinical judgement create a POC that gives a: Questionable prediction of patient's progress: MODERATE COMPLEXITY        TREATMENT:      Pre-treatment Symptoms/Complaints:  none  Pain: Initial: 0/10 Post Session:  0/10     Physical Therapy Re-Evaluation (untimed charge)    Gait Training ( 25 Minutes):  Gait training to improve and/or restore physical functioning as related to mobility, strength, balance and coordination. Ambulated 40 Feet (ft)(x20 with railing; x20 with luke walker) with minimal assistance and minimal visual, verbal, manual and tactile cues related to their stance phase, stride length, heel strike, ankle position and motion and knee position and motionto promote proper body alignment, promote proper body posture and promote proper body mechanics. Instruction in performance of gait mechanics to correct stance phase, stride length, push off and heel strike. Assistive Device: Walker luke, Gait belt  Ambulation - Level of Assistance: Minimal assistance(WC follow for safety)  Distance (ft): 40 Feet (ft)(x20 with railing; x20 with luke walker)  Rail Use: Right   Interventions: Verbal cues, Safety awareness training    Neuromuscular Re-education ( 29 Minutes):  Exercise/activities inlcuding: bed mobility, transfer training, wheelchair transfers, postural retraining, NDT L LE, dynamic standing balance activity with glute and quad activation, and navigation of environment with dual tasking. to improve balance, coordination and posture.   Required minimal visual, verbal, manual and tactile cues to promote static and dynamic balance in standing. Braces/Orthotics/Lines/Etc:   · Sling to L UE throughout transfers and ambulation  Treatment/Session Assessment:    · Response to Treatment:  See above  · Interdisciplinary Collaboration:   o Physical Therapist  o Registered Nurse  o Certified Nursing Assistant/Patient Care Technician  o Charge RN  · After treatment position/precautions:   o Supine in bed  o Bed alarm/tab alert on  o Bed/Chair-wheels locked  o Bed in low position  o Call light within reach  o RN notified  o Patient take off of the floor to [place], [facility], and 10th floor track for treatment session in wheelchair; RN and charge RN notified and okayed patient leaving the floor; returned   · Compliance with Program/Exercises: Compliant all of the time  · Recommendations/Intent for next treatment session: \"Next visit will focus on advancements to more challenging activities and reduction in assistance provided\".     Total Treatment Duration:  PT Patient Time In/Time Out  Time In: 1111  Time Out: 1210    Leroy Waldron DPT

## 2020-03-09 NOTE — PROGRESS NOTES
Hospitalist Progress Note     Admit Date:  2019  9:07 AM   Name:  Ray Nevarez   Age:  53 y.o.  :  1975   MRN:  [id number]   PCP:  Deng Rodas MD  Treatment Team: Attending Provider: Regine Cheney DO; Care Manager: Cornelio Zepeda RN; Care Manager: Sharmin John, [facility] [place]; Utilization Review: Dari Rivera RN; Nurse Practitioner: Lee Cr NP; Charge Nurse: Tre Valerio; Physical Therapist: Beronica Sullivan DPT; Occupational Therapy Assistant: Nusrat Guerrero    Subjective:   HPI and or CC:  66-year-old hypertensive diabetic male presented to the ER with left-sided weakness left facial droop. Acute infarct left cerebellar hemisphere and deep frontoparietal white matter abnormalities as well as right paramedian yariel abnormalities. Old lacunar infarcts noted. No large vessel occlusion on CTA. He has been hospitalized for 87 days awaiting difficult rehab placement. Receiving physical occupational therapy and speech therapy. Tolerating high-dose statin atorvastatin and antiplatelet. PFO noted on ISMAEL 4-week event monitor planned as outpatient and if no dysrhythmia outpatient PFO closure recommended. Patient has no new complaints except awaiting placement    2020:    No new complaints. Magnesium improved from 1.4 now 1.7. Will increase oral magnesium oxide to twice daily consider recheck several days. Awaiting placement and disability assignment.         Objective:     Patient Vitals for the past 24 hrs:   Temp Pulse Resp BP SpO2   20 0800 97.5 °F (36.4 °C) 67 16 133/88 97 %   20 0400 97.7 °F (36.5 °C) 67 17 129/88 98 %   20 0000 98.2 °F (36.8 °C) 76 17 142/78 99 %   20 2000 98 °F (36.7 °C) 68 17 (!) 132/92 98 %   20 1600 98.3 °F (36.8 °C) 78 17 121/79 97 %   20 1200 98.2 °F (36.8 °C) 75 18 (!) 131/91 99 %     Oxygen Therapy  O2 Sat (%): 97 % (20 0800)  Pulse via Oximetry: 66 beats per minute (02/01/20 0000)  O2 Device: Room air (02/01/20 0000)  No intake or output data in the 24 hours ending 03/09/20 1118      REVIEW OF SYSTEMS: Comprehensive ROS performed and negative except as stated in HPI. Physical Examination:  General:    Alert and oriented x3, no new complaintsthis is unchanged  Head:  No facial asymmetry, oral mucous membranes moist  Eyes:  No roving eye movements or nystagmus  CV:   No lower extremity edema, no S3 gallop no gross JVD  Lungs:   Clear to auscultation palpation and percussion with symmetric excursion  Abdomen:   No distention, bowel sounds in all 4 quadrants  Extremities: Left hemiparesis, no gross deformity otherwise  Skin:     No petechia or purpura  Neuro:  Dense left hemiparesis slow speech essentially unchanged. Data Review:  I have reviewed all labs, meds, telemetry events, and studies from the last 24 hours.     Recent Results (from the past 24 hour(s))   MAGNESIUM    Collection Time: 03/09/20  4:55 AM   Result Value Ref Range    Magnesium 1.7 (L) 1.8 - 2.4 mg/dL   GLUCOSE, POC    Collection Time: 03/09/20  5:25 AM   Result Value Ref Range    Glucose (POC) 82 65 - 100 mg/dL        All Micro Results     None          Current Meds:  Current Facility-Administered Medications   Medication Dose Route Frequency    magnesium oxide (MAG-OX) tablet 400 mg  400 mg Oral BID    acetaminophen (TYLENOL) tablet 650 mg  650 mg Oral Q4H PRN    diphenhydrAMINE (BENADRYL) capsule 25 mg  25 mg Oral Q6H PRN    acetaminophen (TYLENOL) tablet 650 mg  650 mg Oral Q8H    lip protectant (BLISTEX) ointment 1 Each  1 Each Topical PRN    metoprolol succinate (TOPROL-XL) XL tablet 25 mg  25 mg Oral DAILY    polyethylene glycol (MIRALAX) packet 17 g  17 g Oral DAILY PRN    guaiFENesin (ROBITUSSIN) 100 mg/5 mL oral liquid 100 mg  100 mg Oral Q4H PRN    hydrALAZINE (APRESOLINE) 20 mg/mL injection 20 mg  20 mg IntraVENous Q4H PRN    atorvastatin (LIPITOR) tablet 80 mg  80 mg Oral QHS    lisinopril (PRINIVIL, ZESTRIL) tablet 40 mg  40 mg Oral DAILY    sodium chloride (NS) flush 5-40 mL  5-40 mL IntraVENous PRN    ondansetron (ZOFRAN) injection 4 mg  4 mg IntraVENous Q4H PRN    aspirin chewable tablet 81 mg  81 mg Oral DAILY    enoxaparin (LOVENOX) injection 40 mg  40 mg SubCUTAneous Q24H    dextrose 40% (GLUTOSE) oral gel 1 Tube  15 g Oral PRN    glucagon (GLUCAGEN) injection 1 mg  1 mg IntraMUSCular PRN    dextrose (D50W) injection syrg 12.5-25 g  25-50 mL IntraVENous PRN       Diet:  DIET NUTRITIONAL SUPPLEMENTS  DIET DIABETIC CONSISTENT CARB    Other Studies (last 24 hours):  No results found. Assessment and Plan:     Hospital Problems as of 3/9/2020 Date Reviewed: 3/3/2017          Codes Class Noted - Resolved POA    Hypomagnesemia ICD-10-CM: E83.42  ICD-9-CM: 275.2  3/7/2020 - Present Unknown        PFO (patent foramen ovale) ICD-10-CM: Q21.1  ICD-9-CM: 745.5  12/17/2019 - Present Yes        Arrhythmia ICD-10-CM: I49.9  ICD-9-CM: 427.9  12/15/2019 - Present Yes        Hyperlipemia ICD-10-CM: E78.5  ICD-9-CM: 272.4  12/15/2019 - Present Yes        * (Principal) Acute embolic stroke [facility]) [id number]: I63.9  ICD-9-CM: 434.11  12/12/2019 - Present Yes        Hypertension (Chronic) ICD-10-CM: I10  ICD-9-CM: 401.9  Unknown - Present Yes        Type 2 diabetes mellitus (HCC) (Chronic) ICD-10-CM: E11.9  ICD-9-CM: 250.00  Unknown - Present Yes              A/P:    Acute embolic stroke  -MRI with acute infarcts in L cerebellar hemisphere, deep frontoparietal white matter, and R paramedian yariel. old lacunar infarcts. Continue aspirin and high-dose atorvastatin  -ISMAEL: 3 mm PFO, Cardiology stated he needs an evaluation for closure PFO with high risk features including significant (3 mm) separation of septum secundum and primum as well as large shunt. They recommend outpatient event monitoring 4 weeks and then schedule shunt closure if no dysrhythmia.   This is remains unchanged. Hypomagnesemia   Received IV, and will increase his oral supplementation.     Hypertension:   Continue metoprolol and ACE inhibitorcontrolled     Type 2 diabetes mellitus:    Remains fasting sugars 80s with lower dose metformin. Discontinue metformin and observe with diabetic diet only       Left upper extremity pain  As needed Tylenol.     Allergies  As needed antihistamine diphenhydramine this is also unchanged      Copied from prior note:DISPO: waiting for pt to be deemed \"disabled\" which in neurologic cases have to allow for 6 months showing chronicity. Zaid Jonas he can get medicaid after that. Medically stable for discharge.      DVT ppx:  lovenox     Signed:  Justen BEAVERS

## 2020-03-10 LAB — GLUCOSE BLD STRIP.AUTO-MCNC: 84 MG/DL (ref 65–100)

## 2020-03-10 PROCEDURE — 82962 GLUCOSE BLOOD TEST: CPT

## 2020-03-10 PROCEDURE — 97112 NEUROMUSCULAR REEDUCATION: CPT

## 2020-03-10 PROCEDURE — 74011250637 HC RX REV CODE- 250/637: Performed by: INTERNAL MEDICINE

## 2020-03-10 PROCEDURE — 74011250636 HC RX REV CODE- 250/636: Performed by: INTERNAL MEDICINE

## 2020-03-10 PROCEDURE — 74011250637 HC RX REV CODE- 250/637: Performed by: HOSPITALIST

## 2020-03-10 PROCEDURE — 97535 SELF CARE MNGMENT TRAINING: CPT

## 2020-03-10 PROCEDURE — 92507 TX SP LANG VOICE COMM INDIV: CPT

## 2020-03-10 PROCEDURE — 65270000029 HC RM PRIVATE

## 2020-03-10 RX ADMIN — GUAIFENESIN 100 MG: 100 SOLUTION ORAL at 06:15

## 2020-03-10 RX ADMIN — Medication 400 MG: at 09:19

## 2020-03-10 RX ADMIN — ATORVASTATIN CALCIUM 80 MG: 80 TABLET, FILM COATED ORAL at 22:06

## 2020-03-10 RX ADMIN — ACETAMINOPHEN 650 MG: 325 TABLET, FILM COATED ORAL at 06:15

## 2020-03-10 RX ADMIN — ACETAMINOPHEN 650 MG: 325 TABLET, FILM COATED ORAL at 15:24

## 2020-03-10 RX ADMIN — ENOXAPARIN SODIUM 40 MG: 40 INJECTION SUBCUTANEOUS at 15:24

## 2020-03-10 RX ADMIN — LISINOPRIL 40 MG: 20 TABLET ORAL at 09:19

## 2020-03-10 RX ADMIN — DIPHENHYDRAMINE HYDROCHLORIDE 25 MG: 25 CAPSULE ORAL at 18:10

## 2020-03-10 RX ADMIN — GUAIFENESIN 100 MG: 100 SOLUTION ORAL at 18:10

## 2020-03-10 RX ADMIN — ASPIRIN 81 MG 81 MG: 81 TABLET ORAL at 09:19

## 2020-03-10 RX ADMIN — METOPROLOL SUCCINATE 25 MG: 25 TABLET, FILM COATED, EXTENDED RELEASE ORAL at 09:19

## 2020-03-10 RX ADMIN — Medication 400 MG: at 18:10

## 2020-03-10 RX ADMIN — ACETAMINOPHEN 650 MG: 325 TABLET, FILM COATED ORAL at 22:06

## 2020-03-10 NOTE — PROGRESS NOTES
Neurolinguistic Goals: goals per progress note 3/10/2020  LTG: Patient will increase neuro-linguistic abilities to increase safety and awareness in functional living environment   STG: Patient will solve mathematical problems with 80%accuracy given minimal cueing. Not met 3/10/2020  STG: Patient will name 10 items to  abstract category in 1 minute given  moderate cueing. Progressing 1/31/2020. MET&edited 3/10/2020  STG: Patient will participate in verbal reasoning tasks with 80% accuracy given minimal cueing. Progressing 3/10/2020  STG: Patient will complete mental manipulation tasks with 80% accuracy given minimal cueing. Not met 3/10/2020  STG: Patient will complete working memory/attention tasks with 80% accuracy given minimal cueing. Progressing 3/10/2020  STG: Patient will recall relevant verbal information with 80% accuracy with minimal assistance. Progressing 3/10/2020  STG: Patient will utilize memory compensatory strategies to improve recall of functional list/message with 90%accuracy given minimal assistance. Progressing 3/10/2020     Dysarthria Goals:  LTG: Patient will develop functional and intelligible speech and utilize compensatory strategies to improve communication across environments. STG: Patient will utilize compensatory strategies at conversation level with 90% accuracy independently.  Progressing 3/10/2020      SPEECH LANGUAGE PATHOLOGY: SPEECH-LANGUAGE/COGNITION: Progress Report 10    NAME/AGE/GENDER: Kath Huizar is a 53 y.o. male  DATE: 3/10/2020  PRIMARY DIAGNOSIS: Acute ischemic stroke ([facility] [I63.9]  Cerebrovascular accident (CVA) due to embolism ([facility] [I63.9]      ICD-10: Treatment Diagnosis: R47.1 Dysarthria and Anarthria  R41.841 Cognitive-Communication Deficit    RECOMMENDATIONS   TREATMENT RECOMMENDATIONS:    Continue to follow for cognitive linguistic and dysarthria treatment     STRATEGIES:    Speech strategies: slow rate/over articulate to improve articulatory precision   Memory: repeat and write down novel/relevant information     EDUCATION:  · Recommendations discussed with Patient and patient's wife      Continuation of Skilled Services/Medical Necessity:   Patient is expected to demonstrate progress in expressive communication and cognitive ability to decrease assistance required communication, increase independence with activities of daily living and increase communication with family/caregivers.  Patient continues to require skilled intervention due to cognitive linguistic deficits and ongoing dysarthria      RECOMMENDATIONS for CONTINUED SPEECH THERAPY: YES: Anticipate need for ongoing speech therapy during this hospitalization and at next level of care. ASSESSMENT   Patient has made overall slow progress towards speech therapy goals. He continues to demonstrate significant deficits in attention, working memory, short term memory, divergent naming, and problem solving. Explicit verbal cues needed to utilize memory strategies (speficially, writing it down, asking follow up questions, and repeat back to ensure accuracy). Patient has demonstrated progress in carryover of dysarthria strategies improving functional communication, however mild-moderate dysarthria persists at conversation level requiring verbal cues to slow down rate and over articulate. Patient continues to function significantly below baseline and will benefit from ongoing skilled intervention to improve functional problem solving, safety, and independence. Recommend: Ongoing speech therapy targeting speech intelligibility and cognitive linguistic function during this hospitalization and at next level of care. COMPLIANCE WITH PROGRAM/EXERCISES: Will assess as treatment progresses  REHABILITATION POTENTIAL FOR STATED GOALS: Good    PLAN    FREQUENCY/DURATION: Continue to follow patient 2 times a week for duration of hospital stay to address above goals.     - Recommendations for next treatment session: Next treatment will address dysarthria and cognitive linguistic deficits    SUBJECTIVE   Patient alert upright in bed for session. Orientation:   Person  Place  Time  Situation    Pain: Pain Scale 1: Numeric (0 - 10)  Pain Intensity 1: 0    OBJECTIVE   The following treatment tasks were completed:   Divergent naming-  Milo category: ~5-6 items in 1 minute across 5 categories, ~10-11 with min cues  Abstract caregory: 5 items in 1 minute    Functional recall with use of memory compensatory strategies-  Appointment #1:  4/5, 5/5 with min cues  Appointment #2: 3/5, 5/5 with request for repetition    Directions: ~75% accuracy, moderate cues to write down key information, request repetition, and repeat back for clarification        Tool Used: MODIFIED BRITT SCALE (mRS)   Score   No Symptoms  [] 0   No significant disability despite symptoms; able to carry out all usual duties and activities  [] 1   Slight disability; unable to carry out all previous activities but able to look after own affairs without assistance. [] 2   Moderate disability; requiring some help but able to walk without assistance  [x] 3   Moderately severe disability; unable to walk without assistance and unable to attend to own bodily needs without assistance  [] 4   Severe disability; bedridden, incontinent, and requiring constant nursing care and attention  [] 5      Score:  Initial: 3 Current: 3   Interpretation of Tool: The Modified Dunn Scale is a 7-point scaled used to quantify level of disability as it relates to a patient's functional abilities. INTERDISCIPLINARY COLLABORATION: Registered Nurse  PRECAUTIONS/ALLERGIES: Patient has no known allergies.      SAFETY:  After treatment position/precautions:  · Upright in bed  · RN notified  · Call light within reach    Total Treatment Duration:     Time In: 1320  Time Out: 2500 [facility] MEDICO DEL [facility] [place], [address] [place] [address]

## 2020-03-10 NOTE — PROGRESS NOTES
03/10/20 1923   NIH Stroke Scale   Interval Other (comment)  (dula)   LOC 0   LOC Questions 0   LOC Commands 0   Best Gaze 0   Visual 0   Facial Palsy 1   Motor Right Arm 0   Motor Left Arm 2   Motor Right Leg 0   Motor Left Leg 2   Limb Ataxia 0   Sensory 0   Best Language 1   Dysarthria 1   Extinction and Inattention 0   Total 7   Dual NIH with Christiana EDWARDS

## 2020-03-10 NOTE — PROGRESS NOTES
Hospitalist Progress Note    3/10/2020  Admit Date: 2019  9:07 AM   NAME: Samantha Talavera   :  1975   MRN:  [id number]   Attending: Kristal Wang MD  PCP:  Ramya Wiseman MD    SUBJECTIVE:   24-year-old hypertensive diabetic male presented to the ER with left-sided weakness left facial droop. Acute infarct left cerebellar hemisphere and deep frontoparietal white matter abnormalities as well as right paramedian yariel abnormalities. Old lacunar infarcts noted. No large vessel occlusion on CTA. He has been hospitalized for 87 days awaiting difficult rehab placement. Receiving physical occupational therapy and speech therapy. Tolerating high-dose statin atorvastatin and antiplatelet. PFO noted on ISMAEL 4-week event monitor planned as outpatient and if no dysrhythmia outpatient PFO closure recommended. Patient has no new complaints except awaiting placement     3/10: Patient seen and examined. Feels better. Wife at bedside. Getting PT. Nursing notes and chart reviewed. Review of Systems negative with exception of pertinent positives noted above. PHYSICAL EXAM     Visit Vitals  /88 (BP 1 Location: Right arm, BP Patient Position: At rest)   Pulse 66   Temp 97.7 °F (36.5 °C)   Resp 16   Ht 5' 6\" (1.676 m)   Wt 95.3 kg (210 lb)   SpO2 94%   BMI 33.89 kg/m²      Temp (24hrs), Av.9 °F (36.6 °C), Min:97.3 °F (36.3 °C), Max:98.3 °F (36.8 °C)    Oxygen Therapy  O2 Sat (%): 94 % (03/10/20 0800)  Pulse via Oximetry: 66 beats per minute (20 0000)  O2 Device: Room air (20 0000)    Intake/Output Summary (Last 24 hours) at 3/10/2020 1103  Last data filed at 3/10/2020 0826  Gross per 24 hour   Intake 240 ml   Output    Net 240 ml         General: No acute distress. Alert.    Head:  AT/NC  Lungs:  CTABL. Heart:  RRR, no murmur, rub, or gallop  Abdomen: Soft, non-distended, non-tender, +bs  Extremities: No cyanosis or clubbing.   Neurologic:   left hemiparesis  Skin:  No Obvious Rash  Psych:  Normal affect. LABS AND STUDIES:  Personally reviewed all labs, meds, and studies for past 24hrs. ASSESSMENT      Active Hospital Problems    Diagnosis Date Noted    Hypomagnesemia 03/07/2020    PFO (patent foramen ovale) 12/17/2019    Arrhythmia 12/15/2019    Hyperlipemia 73/90/3811    Acute embolic stroke ([facility] 91/13/3007    Type 2 diabetes mellitus (HCC)     Hypertension            PLAN:  Acute embolic stroke  -MRI with acute infarcts in L cerebellar hemisphere, deep frontoparietal white matter, and R paramedian yariel. old lacunar infarcts. Continue aspirin and high-dose atorvastatin  -ISMAEL: 3 mm PFO, Cardiology stated he needs an evaluation for closure PFO with high risk features including significant (3 mm) separation of septum secundum and primum as well as large shunt. They recommend outpatient event monitoring 4 weeks and then schedule shunt closure if no dysrhythmia.     Hypomagnesemia              Received IV, and on oral supplementation. Recheck in a.m.     Hypertension:   Continue metoprolol and ACE inhibitorcontrolled     Type 2 diabetes mellitus:     Discontinue metformin and observe with diabetic diet only        Left upper extremity pain  As needed Tylenol.     Allergies  As needed antihistamine diphenhydramine this is also unchanged        DISPO: waiting for pt to be deemed \"disabled\" which in neurologic cases have to allow for 6 months showing chronicity.  Then he can get medicaid after that.  Medically stable for discharge.  on board. DVT ppx:    Discussed plan with pt who is in agreement. All questions answered.     Signed By: Angelica Marinelli MD     March 10, 2020

## 2020-03-10 NOTE — PROGRESS NOTES
Problem: Self Care Deficits Care Plan (Adult)  Goal: *Acute Goals and Plan of Care (Insert Text)  Description  GOALS UPDATED : 3/3/2020   (Met, 3/3/2020)  2. Patient will demonstrate appropriate safety awareness and protection of L UE during bed mobility and functional transfers with minimal cues. (Progressing, still needs cues, 3/3/2020)  3. Patient will complete total body bathing and dressing with moderate assistance and adaptive equipment as needed. (Progressing, UB bathing Min A, UB dressing at Mod A, LB dressing at Dep, 3/3/2020)  4. Patient will complete weightbearing into the L UE with ADL tasks with minimal assistance to improve ability to use as a functional assist during ADL tasks. (Progressing,3/3/2020)5. Patient will demonstrate L UE SROM HEP within 7 days. Progressing 3/3/2020  8. (Goal Met)  9. Pt will complete bed mobility at mod I. New goal  10. Pt will complete dynamic sitting balance for ADLs at mod I with good balance. New goal  11. Pt will complete functional transfers at [facility] to [place] [address] with equipment as needed. New goal  12. Pt will complete grooming tasks in standing at sink level x5 mins with good balance and equipment as needed.  New goal    Outcome: Progressing Towards Goal     OCCUPATIONAL THERAPY: Daily Note and PM    3/10/2020  INPATIENT: OT Visit Days: 4  Payor: MEDICAID PENDING / Plan: Chad Ha PENDING / Product Type: Medicaid /      NAME/AGE/GENDER: Edilberto Dos Santos is a 53 y.o. male   PRIMARY DIAGNOSIS:  Acute ischemic stroke ([address] [I63.9]  Cerebrovascular accident (CVA) due to embolism ([address] [Z70.4] Acute embolic stroke ([address] Acute embolic stroke ([address]       ICD-10: Treatment Diagnosis:    · Generalized Muscle Weakness (M62.81)  · Other lack of cordination (R27.8)  · Hemiplegia and hemiparesis following cerebral infarction affecting   · left non-dominant side (I69.354)  · Abnormal posture (R29.3)   Precautions/Allergies:    NO PULLING ON LUE   LUE in sling with shoulder joint approximated and supported, needs taping   Patient has no known allergies. ASSESSMENT:     Mr. Latrell Collins sitting on toilet upon entrance. Patient performing sponge bath with aide, and patient required minimal assistance to wash feet. Patient stood from toilet with minimal assistance with hem iwalker, and he ambulated to bed with minimal assistance using luke walker. Patient sat with CGA. Patient continues to not report shoulder pain. KT applied to L shoulder, I strip on upper trap mm, X strip on rhomboid mm, and I strip wrap around on L shoulder for support, for proprioceptive input, and to prevent further pain/subluxation. Weight bearing L UE with weight shift while patient reaching with R UE. Patient worked on L lateral hip hike while reaching with R UE and moderate NDT input provided. Patient performed L shoulder horizontal adduction, shoulder flex/extension, elbow flex, pronation/supination, wrist flex/ext, digital flex. Patient unable to extend L digits this date. Encourage patient to perform L SROM, focusing on L wrist extension with stretch at end range. Patient performed bed to chair transfer with minimal assist using luke walker. Patient required moderate assistance to don socks while sitting in chair. Patient very motivated with therapy tasks. Slow steady progress towards goals. Cont OT per tx plan. This section established at most recent assessment   PROBLEM LIST (Impairments causing functional limitations):  1. Decreased Strength  2. Decreased ADL/Functional Activities  3. Decreased Transfer Abilities  4. Decreased Ambulation Ability/Technique  5. Decreased Balance  6. Decreased Activity Tolerance  7. Increased Fatigue  8. Decreased Flexibility/Joint Mobility  9. Decreased Knowledge of Precautions  10. Decreased [place] with Home Exercise Program   INTERVENTIONS PLANNED: (Benefits and precautions of occupational therapy have been discussed with the patient.)  1.  Activities of daily living training  2. Adaptive equipment training  3. Balance training  4. Clothing management  5. Cognitive training  6. Donning&doffing training  7. Keanu tech training  8. Neuromuscular re-eduation  9. Therapeutic activity  10. Therapeutic exercise     TREATMENT PLAN: Frequency/Duration: Follow patient 3 times per week to address above goals. Rehabilitation Potential For Stated Goals: Excellent     REHAB RECOMMENDATIONS (at time of discharge pending progress):    Placement: It is my opinion, based on this patient's performance to date, that Mr. Sneha Munguia may benefit from intensive therapy at an [address] [place] [address] after discharge due to potential to make ongoing and sustainable functional improvement that is of practical value. Mickey Cordon Pt functioning far below independent baseline, demonstrating good improvement and participation. Pt would likely benefit greatly and increase independence from inpatient rehab stay. Equipment:    TBD               OCCUPATIONAL PROFILE AND HISTORY:   History of Present Injury/Illness (Reason for Referral):  See H&P  Past Medical History/Comorbidities:   Mr. Sneha Munguia  has a past medical history of Acute ischemic stroke ([address] (12/12/2019), Acute pancreatitis (11/19/2014), Cerebrovascular accident (CVA) due to embolism ([address] (12/12/2019), Diabetes ([address] (2002), Diabetes ([address] Diabetes mellitus, and Hypertension. He also has no past medical history of Arthritis, Asthma, Autoimmune disease ([address] CAD (coronary artery disease), Cancer ([address] Chronic kidney disease, COPD, Dementia, Dementia ([address] Heart failure ([address] Ill-defined condition, Infectious disease, Liver disease, Other ill-defined conditions(799.89), Psychiatric disorder, PUD (peptic ulcer disease), Seizures ([address] or Sleep disorder. Mr. Sneha Munguia  has a past surgical history that includes hx hernia repair and hx orthopaedic.   Social History/Living Environment:   Home Environment: Apartment  # Steps to Enter: 12  Rails to Enter: Yes  Hand Rails : Bilateral  One/Two Story Residence: One story  Living Alone: No  Support Systems: Spouse/Significant Other/Partner  Patient Expects to be Discharged to[de-identified] Unknown  Current DME Used/Available at Home: None  Tub or Shower Type: Tub/Shower combination  Prior Level of Function/Work/Activity:  Pt lives at home with his wife. Pt is typically independent with ADL/functional mobility. Pt does not drive. Pt was working part-time at [employer]. Personal Factors:          Age:  53 y.o. Past/Current Experience:  CVA with flaccid L side        Other factors that influence how disability is experienced by the patient:  multiple co-morbidities    Number of Personal Factors/Comorbidities that affect the Plan of Care: Extensive review of physical, cognitive, and psychosocial performance (3+):  HIGH COMPLEXITY   ASSESSMENT OF OCCUPATIONAL PERFORMANCE[de-identified]   Activities of Daily Living:   Basic ADLs (From Assessment) Complex ADLs (From Assessment)   Feeding: Setup, Additional time  Oral Facial Hygiene/Grooming: Moderate assistance  Bathing: Moderate assistance  Upper Body Dressing: Maximum assistance  Lower Body Dressing: Maximum assistance  Toileting: Maximum assistance, Additional time, Adaptive equipment(wearing brief) Instrumental ADL  Meal Preparation: Total assistance  Homemaking: Total assistance  Medication Management: Total assistance  Financial Management: Total assistance   Grooming/Bathing/Dressing Activities of Daily Living         Upper Body Bathing  Bathing Assistance: Min A   Position Performed: Seated in chair      Lower Body Bathing  Bathing Assistance: Minimum assistance  Cues: Tactile cues provided;Verbal cues provided           Lower Body Dressing Assistance  Socks:  Total assistance (dependent)       Most Recent Physical Functioning:   Gross Assessment:                  Posture:     Balance:    Bed Mobility:     Wheelchair Mobility:     Transfers:               Patient Vitals for the past 6 hrs:   BP BP Patient Position SpO2 Pulse   03/10/20 1200 118/78 Sitting 96 % 64       Mental Status  Neurologic State: Alert  Orientation Level: Oriented X4  Cognition: Follows commands  Perception: Verbal, Cues to attend to left side of body, Cues to attend left visual field, Visual, Cues to maintain midline in sitting, Cues to maintain midline in standing, Tactile  Perseveration: No perseveration noted  Safety/Judgement: Fall prevention, Decreased awareness of need for assistance, Decreased awareness of need for safety, Decreased awareness of environment, Decreased insight into deficits                          Physical Skills Involved:  1. Range of Motion  2. Balance  3. Strength  4. Activity Tolerance  5. Fine Motor Control  6. Gross Motor Control Cognitive Skills Affected (resulting in the inability to perform in a timely and safe manner):  1. Perception  2. Expression Psychosocial Skills Affected:  1. Habits/Routines  2. Environmental Adaptation  3. Social Interaction  4. Emotional Regulation  5. Self-Awareness  6. Awareness of Others  7. Social Roles   Number of elements that affect the Plan of Care: 5+:  HIGH COMPLEXITY   CLINICAL DECISION MAKIN[address] [place] [id number] AM-PAC 6 Clicks   Daily Activity Inpatient Short Form  How much help from another person does the patient currently need. .. Total A Lot A Little None   1. Putting on and taking off regular lower body clothing? [x] 1   [] 2   [] 3   [] 4   2. Bathing (including washing, rinsing, drying)? [] 1   [x] 2   [] 3   [] 4   3. Toileting, which includes using toilet, bedpan or urinal?   [] 1   [x] 2   [] 3   [] 4   4. Putting on and taking off regular upper body clothing? [] 1   [x] 2   [] 3   [] 4   5. Taking care of personal grooming such as brushing teeth? [] 1   [x] 2   [] 3   [] 4   6. Eating meals? [] 1   [] 2   [x] 3   [] 4   © , Trustees of [address] [place] [address] [address] [address], under license to [employer].  All rights reserved      Score:  Initial: 11 Most Recent: 12 (Date: 3/3/2020 ),     Interpretation of Tool:  Represents activities that are increasingly more difficult (i.e. Bed mobility, Transfers, Gait). Medical Necessity:     · Patient demonstrates   · good and excellent  ·  rehab potential due to higher previous functional level. Reason for Services/Other Comments:  · Patient continues to require skilled intervention due to   · Decreased independence with ADL/functional transfers that impacts overall quality of life. · .   Use of outcome tool(s) and clinical judgement create a POC that gives a: MODERATE COMPLEXITY         TREATMENT:   (In addition to Assessment/Re-Assessment sessions the following treatments were rendered)     Pre-treatment Symptoms/Complaints:  None  Pain: Initial:   Pain Intensity 1: 0  Post Session: no complaint of pain   Self Care: (12 minutes): Procedure(s) (per grid) utilized to improve and/or restore self-care/home management as related to dressing and bathing. Required minimal to moderate verbal and tactile cueing to facilitate activities of daily living skills and compensatory activities. Neuromuscular Re-education: ( 42 minutes):  Exercise/activities per grid below to improve balance, coordination, kinesthetic sense, posture and proprioception. Required moderate verbal, manual and tactile cues to promote coordination of left, upper extremity(s) and trunk alignment, WB'g L UE w weight shift, L hip hike to prepare for ADLs.                    Date:  3/9/2020 Date:   Date:        Activity/Exercise Parameters Parameters Parameters     LUE finger flexion, AROM gravity minimized then AAROM to complete ROM 2x10 reps       LUE finger extension AAROM 2x10 reps       LUE wrist extension, AROM gravity minimized then AAROM to complete ROM 1x10 reps       LUE wrist extension, AROM against gravity  1x10 reps       LUE wrist flexion AAROM 2x10 reps       LUE elbow flexion/extension PROM x10 reps LUE shoulder flexion PROM x10 reps       L UE pronation/supination PROM                             Braces/Orthotics/Lines/Etc:   · O2 device: Room air  · Treatment/Session Assessment:    · Response to Treatment:  Pt demonstrated good participation, making progress, L inattention, needing cues. · Interdisciplinary Collaboration:   o Certified Occupational Therapy Assistant  o Rehabilitation Attendant  o Certified Nursing Assistant/Patient Care Technician  · After treatment position/precautions:   o Up in chair  o Bed alarm/tab alert on  o Bed/Chair-wheels locked  o Bed in low position  o Call light within reach  o RN notified  o Family at bedside   · Compliance with Program/Exercises: Compliant all of the time, Will assess as treatment progresses. · Recommendations/Intent for next treatment session: \"Next visit will focus on advancements to more challenging activities and reduction in assistance provided\".   Total Treatment Duration:    OT Patient Time In/Time Out  Time In: 1105  Time Out: C/Georgette Monahan 1106, OT

## 2020-03-10 NOTE — PROGRESS NOTES
Problem: Pressure Injury - Risk of  Goal: *Prevention of pressure injury  Description  Document Dewey Scale and appropriate interventions in the flowsheet. Outcome: Progressing Towards Goal  Note: Pressure Injury Interventions:  Sensory Interventions: Assess changes in LOC, Discuss PT/OT consult with provider, Float heels, Keep linens dry and wrinkle-free, Maintain/enhance activity level, Pressure redistribution bed/mattress (bed type)    Moisture Interventions: Absorbent underpads, Limit adult briefs, Maintain skin hydration (lotion/cream)    Activity Interventions: Increase time out of bed, Pressure redistribution bed/mattress(bed type), PT/OT evaluation    Mobility Interventions: HOB 30 degrees or less, Pressure redistribution bed/mattress (bed type), PT/OT evaluation, Float heels    Nutrition Interventions: Document food/fluid/supplement intake, Discuss nutritional consult with provider, Offer support with meals,snacks and hydration    Friction and Shear Interventions: Lift sheet, HOB 30 degrees or less    Problem: Falls - Risk of  Goal: *Absence of Falls  Description  Document Jeanne Fall Risk and appropriate interventions in the flowsheet.   Outcome: Progressing Towards Goal  Note: Fall Risk Interventions:  Mobility Interventions: Bed/chair exit alarm, Communicate number of staff needed for ambulation/transfer, OT consult for ADLs, Patient to call before getting OOB, PT Consult for mobility concerns, Utilize walker, cane, or other assistive device    Mentation Interventions: Adequate sleep, hydration, pain control, Bed/chair exit alarm, More frequent rounding, Toileting rounds, Update white board    Medication Interventions: Bed/chair exit alarm, Evaluate medications/consider consulting pharmacy, Patient to call before getting OOB, Teach patient to arise slowly    Elimination Interventions: Bed/chair exit alarm, Call light in reach, Patient to call for help with toileting needs, Stay With Me (per policy), Toileting schedule/hourly rounds, Urinal in reach    History of Falls Interventions: Bed/chair exit alarm, Consult care management for discharge planning, Door open when patient unattended, Evaluate medications/consider consulting pharmacy    Problem: General Medical Care Plan  Goal: *Vital signs within specified parameters  Outcome: Progressing Towards Goal  Goal: *Optimal pain control at patient's stated goal  Outcome: Progressing Towards Goal     Problem: Ischemic Stroke: Discharge Outcomes  Goal: *Hemodynamically stable  Outcome: Progressing Towards Goal  Goal: *Absence of aspiration pneumonia  Outcome: Progressing Towards Goal  Goal: *Tolerating diet  Outcome: Progressing Towards Goal  Goal: *Absence of DVT(Stroke Metric)  Outcome: Progressing Towards Goal  Goal: *Absence of aspiration  Outcome: Progressing Towards Goal  Goal: *Optimal pain control at patient's stated goal  Outcome: Progressing Towards Goal  Goal: *Understands and describes signs and symptoms to report to providers(Stroke Metric)  Outcome: Progressing Towards Goal  Goal: *Neurolgocially stable (absence of additional neurological deficits)  Outcome: Progressing Towards Goal     Problem: Pain  Goal: *Control of Pain  Outcome: Progressing Towards Goal

## 2020-03-10 NOTE — PROGRESS NOTES
SPEECH PATHOLOGY NOTE:    Attempted to see patient, however getting assistance back to bed. Will check back at later time/date.        Layla [address] CCC-SLP

## 2020-03-11 LAB
GLUCOSE BLD STRIP.AUTO-MCNC: 124 MG/DL (ref 65–100)
MAGNESIUM SERPL-MCNC: 1.8 MG/DL (ref 1.8–2.4)

## 2020-03-11 PROCEDURE — 65270000029 HC RM PRIVATE

## 2020-03-11 PROCEDURE — 74011250636 HC RX REV CODE- 250/636: Performed by: INTERNAL MEDICINE

## 2020-03-11 PROCEDURE — 82962 GLUCOSE BLOOD TEST: CPT

## 2020-03-11 PROCEDURE — 97530 THERAPEUTIC ACTIVITIES: CPT

## 2020-03-11 PROCEDURE — 74011250637 HC RX REV CODE- 250/637: Performed by: INTERNAL MEDICINE

## 2020-03-11 PROCEDURE — 36415 COLL VENOUS BLD VENIPUNCTURE: CPT

## 2020-03-11 PROCEDURE — 83735 ASSAY OF MAGNESIUM: CPT

## 2020-03-11 PROCEDURE — 92507 TX SP LANG VOICE COMM INDIV: CPT

## 2020-03-11 PROCEDURE — 74011250637 HC RX REV CODE- 250/637: Performed by: HOSPITALIST

## 2020-03-11 RX ADMIN — ACETAMINOPHEN 650 MG: 325 TABLET, FILM COATED ORAL at 15:31

## 2020-03-11 RX ADMIN — METOPROLOL SUCCINATE 25 MG: 25 TABLET, FILM COATED, EXTENDED RELEASE ORAL at 08:47

## 2020-03-11 RX ADMIN — DIPHENHYDRAMINE HYDROCHLORIDE 25 MG: 25 CAPSULE ORAL at 17:48

## 2020-03-11 RX ADMIN — Medication 400 MG: at 08:47

## 2020-03-11 RX ADMIN — ACETAMINOPHEN 650 MG: 325 TABLET, FILM COATED ORAL at 05:29

## 2020-03-11 RX ADMIN — ACETAMINOPHEN 650 MG: 325 TABLET, FILM COATED ORAL at 21:10

## 2020-03-11 RX ADMIN — LISINOPRIL 40 MG: 20 TABLET ORAL at 08:47

## 2020-03-11 RX ADMIN — ATORVASTATIN CALCIUM 80 MG: 80 TABLET, FILM COATED ORAL at 21:10

## 2020-03-11 RX ADMIN — GUAIFENESIN 100 MG: 100 SOLUTION ORAL at 17:48

## 2020-03-11 RX ADMIN — ASPIRIN 81 MG 81 MG: 81 TABLET ORAL at 08:47

## 2020-03-11 RX ADMIN — Medication 400 MG: at 17:48

## 2020-03-11 NOTE — PROGRESS NOTES
Hospitalist Progress Note    3/11/2020  Admit Date: 2019  9:07 AM   NAME: Hazel Waldron   :  1975   MRN:  [id number]   Attending: Amisha Prado MD  PCP:  Marco Hoyos MD    SUBJECTIVE:   47-year-old hypertensive diabetic male presented to the ER with left-sided weakness left facial droop. Acute infarct left cerebellar hemisphere and deep frontoparietal white matter abnormalities as well as right paramedian yariel abnormalities. Old lacunar infarcts noted. No large vessel occlusion on CTA. He has been hospitalized for 87 days awaiting difficult rehab placement. Receiving physical occupational therapy and speech therapy. Tolerating high-dose statin atorvastatin and antiplatelet. PFO noted on ISMAEL 4-week event monitor planned as outpatient and if no dysrhythmia outpatient PFO closure recommended. Patient has no new complaints except awaiting placement     3/11: Patient seen and examined. Feels better. No new headache. Nursing notes and chart reviewed. Review of Systems negative with exception of pertinent positives noted above. PHYSICAL EXAM     Visit Vitals  /86 (BP 1 Location: Right arm, BP Patient Position: At rest)   Pulse 65   Temp 97.8 °F (36.6 °C)   Resp 16   Ht 5' 6\" (1.676 m)   Wt 95.3 kg (210 lb)   SpO2 96%   BMI 33.89 kg/m²      Temp (24hrs), Av °F (36.7 °C), Min:97.6 °F (36.4 °C), Max:98.3 °F (36.8 °C)    Oxygen Therapy  O2 Sat (%): 96 % (20 0800)  Pulse via Oximetry: 66 beats per minute (20 0000)  O2 Device: Room air (20 0000)  No intake or output data in the 24 hours ending 20 0923      General: No acute distress. Alert.    Head:  AT/NC  Lungs:  CTABL. Heart:  RRR, no murmur, rub, or gallop  Abdomen: Soft, non-distended, non-tender, +bs  Extremities: No cyanosis or clubbing. Neurologic:   left hemiparesis  Skin:  No Obvious Rash  Psych:  Normal affect.      LABS AND STUDIES:  Personally reviewed all labs, meds, and studies for past 24hrs.      ASSESSMENT      Active Hospital Problems    Diagnosis Date Noted    Hypomagnesemia 03/07/2020    PFO (patent foramen ovale) 12/17/2019    Arrhythmia 12/15/2019    Hyperlipemia 48/35/2155    Acute embolic stroke ([facility] [address] 84/62/2182    Type 2 diabetes mellitus (HCC)     Hypertension            PLAN:  Acute embolic stroke  -MRI with acute infarcts in L cerebellar hemisphere, deep frontoparietal white matter, and R paramedian yariel. old lacunar infarcts. Continue aspirin and high-dose atorvastatin  -ISMAEL: 3 mm PFO, Cardiology stated he needs an evaluation for closure PFO with high risk features including significant (3 mm) separation of septum secundum and primum as well as large shunt. They recommend outpatient event monitoring 4 weeks and then schedule shunt closure if no dysrhythmia.     Hypomagnesemia              Received IV, and on oral supplementation. Recheck in a.m.     Hypertension:   Continue metoprolol and ACE inhibitorcontrolled     Type 2 diabetes mellitus:     Discontinue metformin and observe with diabetic diet only        Left upper extremity pain  As needed Tylenol.     Allergies  As needed antihistamine diphenhydramine this is also unchanged        DISPO: waiting for pt to be deemed \"disabled\" which in neurologic cases have to allow for 6 months showing chronicity.  Then he can get medicaid after that.  Medically stable for discharge.  on board. DVT ppx:  lovenox  Discussed plan with pt who is in agreement. All questions answered.     Signed By: Kia Covarrubias MD     March 11, 2020

## 2020-03-11 NOTE — PROGRESS NOTES
Neurolinguistic Goals: goals per progress note 3/10/2020  LTG: Patient will increase neuro-linguistic abilities to increase safety and awareness in functional living environment   STG: Patient will solve mathematical problems with 80%accuracy given minimal cueing. Not met 3/10/2020  STG: Patient will name 10 items to  abstract category in 1 minute given  moderate cueing. Progressing 1/31/2020. MET&edited 3/10/2020  STG: Patient will participate in verbal reasoning tasks with 80% accuracy given minimal cueing. Progressing 3/10/2020  STG: Patient will complete mental manipulation tasks with 80% accuracy given minimal cueing. Not met 3/10/2020  STG: Patient will complete working memory/attention tasks with 80% accuracy given minimal cueing. Progressing 3/10/2020  STG: Patient will recall relevant verbal information with 80% accuracy with minimal assistance. Progressing 3/10/2020  STG: Patient will utilize memory compensatory strategies to improve recall of functional list/message with 90%accuracy given minimal assistance. Progressing 3/10/2020     Dysarthria Goals:  LTG: Patient will develop functional and intelligible speech and utilize compensatory strategies to improve communication across environments. STG: Patient will utilize compensatory strategies at conversation level with 90% accuracy independently.  Progressing 3/10/2020      SPEECH LANGUAGE PATHOLOGY: SPEECH-LANGUAGE/COGNITION: Daily Note 1    NAME/AGE/GENDER: Joe Silveira is a 53 y.o. male  DATE: 3/11/2020  PRIMARY DIAGNOSIS: Acute ischemic stroke ([facility] [address] [I63.9]  Cerebrovascular accident (CVA) due to embolism ([facility] [address] [I63.9]      ICD-10: Treatment Diagnosis: R47.1 Dysarthria and Anarthria  R41.841 Cognitive-Communication Deficit    RECOMMENDATIONS   TREATMENT RECOMMENDATIONS:    Continue to follow for cognitive linguistic and dysarthria treatment     STRATEGIES:    Speech strategies: slow rate/over articulate to improve articulatory precision   Memory: repeat and write down novel/relevant information     EDUCATION:  Recommendations discussed with Patient   Continuation of Skilled Services/Medical Necessity:   Patient is expected to demonstrate progress in expressive communication and cognitive ability to decrease assistance required communication, increase independence with activities of daily living and increase communication with family/caregivers.  Patient continues to require skilled intervention due to cognitive linguistic deficits and ongoing dysarthria      RECOMMENDATIONS for CONTINUED SPEECH THERAPY: YES: Anticipate need for ongoing speech therapy during this hospitalization and at next level of care. ASSESSMENT   Patient has made overall slow progress towards speech therapy goals. He continues to demonstrate significant deficits in attention, working memory, short term memory, divergent naming, and problem solving. Patient has demonstrated progress in carryover of dysarthria strategies improving functional communication, however mild-moderate dysarthria persists at conversation level   Patient continues to function significantly below baseline and will benefit from ongoing skilled intervention to improve functional problem solving, safety, and independence. Recommend: Ongoing speech therapy targeting speech intelligibility and cognitive linguistic function during this hospitalization and at next level of care. COMPLIANCE WITH PROGRAM/EXERCISES: Will assess as treatment progresses  REHABILITATION POTENTIAL FOR STATED GOALS: Fair    PLAN    FREQUENCY/DURATION: Continue to follow patient 2 times a week for duration of hospital stay to address above goals. - Recommendations for next treatment session: Next treatment will address dysarthria and cognitive linguistic deficits    SUBJECTIVE   Patient alert upright in bed for session.      Orientation:   Person  Place  Time  Situation    Pain: Pain Scale 1: Visual  Pain Intensity 1: 0    OBJECTIVE   Pt completed dysarthria tasks on this date. Pt repeated short sentences after therapist with min cues for his compensatory speech strategies. Pt had 80% intelligibility. Tool Used: MODIFIED BRITT SCALE (mRS)   Score   No Symptoms  [] 0   No significant disability despite symptoms; able to carry out all usual duties and activities  [] 1   Slight disability; unable to carry out all previous activities but able to look after own affairs without assistance. [] 2   Moderate disability; requiring some help but able to walk without assistance  [x] 3   Moderately severe disability; unable to walk without assistance and unable to attend to own bodily needs without assistance  [] 4   Severe disability; bedridden, incontinent, and requiring constant nursing care and attention  [] 5      Score:  Initial: 3 Current: 3   Interpretation of Tool: The Modified Reading Scale is a 7-point scaled used to quantify level of disability as it relates to a patient's functional abilities. INTERDISCIPLINARY COLLABORATION: Registered Nurse  PRECAUTIONS/ALLERGIES: Patient has no known allergies.      SAFETY:  After treatment position/precautions:  · Upright in bed  · RN notified  · Call light within reach    Total Treatment Duration:     Time In: 1000  Time Out: [address] MA/FROYLAN/SLP

## 2020-03-11 NOTE — PROGRESS NOTES
Problem: Mobility Impaired (Adult and Pediatric)  Goal: *Acute Goals and Plan of Care  Description  GOALS UPDATED ON RE-ASSESSMENT 3/9/2020 (advancements to goals in bold):  1. Patient will perform bed mobility with supervision and 0 verbal cues within 7 treatment days. 2. Patient will perform transfer bed to chair with SUPERVISION within 7 treatment days. 3. Patient will demonstrate fair+ dynamic balance throughout stance phase on L LE within 7 treatment days. 4. Patient will require CGA throughout swing phase on (to advance) L LE within 7 treatment days. 5. Patient demonstrate 3+/5 strength in L LE hip flexion, hip abduction, and hip adduction within 7 treatment days. 6. Patient will ambulate 150ft+ with CGA and least retrictive assistive device within 7 treatment days. 7. Patient will perform wheelchair mobility x75ft with SUPERVISION within 7 treatment days. PREVIOUSLY MET GOALS:  2. Patient will perform STS transfer with CGA within 7 treatment days. GOAL MET : 3/3/20  3. Patient will perform bed to (wheel) chair transfer with CGA with 7 treatment days. GOAL MET: 3/4/20  PHYSICAL THERAPY: Daily Note and PM 3/11/2020  INPATIENT: PT Visit Days : 2  Payor: MEDICAID PENDING / Plan: [place] PENDING / Product Type: Medicaid /       NAME/AGE/GENDER: Fernanda Vieyra is a 53 y.o. male   PRIMARY DIAGNOSIS: Acute ischemic stroke ([address] [I63.9]  Cerebrovascular accident (CVA) due to embolism ([address] [V87.4] Acute embolic stroke ([address] Acute embolic stroke ([address]       ICD-10: Treatment Diagnosis:   · Difficulty in walking, Not elsewhere classified (R26.2)  · Hemiplegia and hemiparesis following cerebral infarction affecting left non-dominant side (Z56.611)   Precaution/Allergies:  Patient has no known allergies. ASSESSMENT:     Mr. Sneha Munguia is a 53year old admitted R MCA CVA. Patient seen this AM for physical therapy re-evaluation to address progression toward acute PT goals.  Since last re-assessment, patient has met 3/8 goals and progressing in remaining 5 goals requiring upgrade on this re-assessment. Patient continues to demonstrate improved L LE strength with improved quad and glute activation appreciated today. Activation in distal L UE as well as horizontal abduction throughout functional mobility appreciated as well (refer to OT note for UE specifics) Currently requiring supervision and verbal cues for bed mobility, SBA with scooting, SBA with sit to stand and stand to sit transfers, SBA-CGA with transfer bed to chair, SBA-CGA with wheelchair mobility for modified household level distance, and ambulation x60ft+ with minimal assistance for L LE mechanics. Patient continues to make significant gains in functional mobility evidenced by meeting goals and progression on [place] AM-Pac Short Form. Recommend inpatient rehabilitation at discharge. Patient uninsured; making placement challenging. Pt supine on contact and agreeable to work with therapy. He sat to EOB with min assist . Sat a minute while sling was being placed. He stood and amb with luke walker 80', took a standing rest then amb additional 40' working on dorsiflexion (being aware)  with swing through and foot placement. He sat in w/c and was pushed back to room. Left in w/c with RN notified. This section established at most recent assessment   PROBLEM LIST (Impairments causing functional limitations):  1. Decreased Strength  2. Decreased ADL/Functional Activities  3. Decreased Transfer Abilities  4. Decreased Ambulation Ability/Technique  5. Decreased Balance  6. Increased Pain  7. Decreased Activity Tolerance  8. Increased Fatigue  9. Decreased Flexibility/Joint Mobility   INTERVENTIONS PLANNED: (Benefits and precautions of physical therapy have been discussed with the patient.)  1. Balance Exercise  2. Bed Mobility  3. Family Education  4. Gait Training  5. Home Exercise Program (HEP)  6. Manual Therapy  7.  Neuromuscular Re-education/Strengthening  8. Range of Motion (ROM)  9. Therapeutic Activites  10. Therapeutic Exercise/Strengthening  11. Transfer Training     TREATMENT PLAN: Frequency/Duration: 3 times a week for duration of hospital stay  Rehabilitation Potential For Stated Goals: Good     REHAB RECOMMENDATIONS (at time of discharge pending progress):    Placement: It is my opinion, based on this patient's performance to date, that Mr. Leonid Baumann may benefit from intensive therapy at an [address] [place] [address] after discharge due to a probable need for close medical supervision by a rehab physician, a probable need for multiple therapy disciplines and potential to make ongoing and sustainable functional improvement that is of practical value. .  Equipment:    TBD pending progress with therapy. HISTORY:   History of Present Injury/Illness (Reason for Referral):  Per H&P: \"Pt is a 52 y/o smoker with DM, HTN, who presented to ER with L leg and arm weakness, L facial droop, dysarthria. First noted L leg weakness late night 12/11 when he woke up to go to the bathroom. He was normal when he went to bed around 1030. Woke up this morning and had persistent weakness L leg and also now noted in L arm. EMS called. Noted to have slurred speech and L facial droop as well. Code S called in ER around 9am.  MRI with acute infarcts in L cerebellar hemisphere, deep frontoparietal white matter, and R paramedian yariel. Also noted old lacunar infarcts. No large vessel occlusion on CTA, but some stenosis noted. No hx afib, TIA, CVA. No CP, palpitations, SOB. \"  Past Medical History/Comorbidities:   Mr. Leonid Baumann  has a past medical history of Acute ischemic stroke ([address] (12/12/2019), Acute pancreatitis (11/19/2014), Cerebrovascular accident (CVA) due to embolism ([address] (12/12/2019), Diabetes ([address] (2002), Diabetes ([address] Diabetes mellitus, and Hypertension.  He also has no past medical history of Arthritis, Asthma, Autoimmune disease ([facility] [address] CAD (coronary artery disease), Cancer ([facility] [address] Chronic kidney disease, COPD, Dementia, Dementia ([facility] [address] Heart failure ([facility] [address] Ill-defined condition, Infectious disease, Liver disease, Other ill-defined conditions(799.89), Psychiatric disorder, PUD (peptic ulcer disease), Seizures ([facility] [address] or Sleep disorder. Mr. Naun Hampton  has a past surgical history that includes hx hernia repair and hx orthopaedic. Social History/Living Environment:   Home Environment: Apartment  # Steps to Enter: 12  Rails to Enter: Yes  [employer] : Bilateral  One/Two Story Residence: One story  Living Alone: No  Support Systems: Spouse/Significant Other/Partner  Patient Expects to be Discharged to[de-identified] Unknown  Current DME Used/Available at Home: None  Tub or Shower Type: Tub/Shower combination  Prior Level of Function/Work/Activity:  Independent, lives with wife in 2nd story 1 level apartment. No recent falls. Number of Personal Factors/Comorbidities that affect the Plan of Care: 1-2: MODERATE COMPLEXITY   EXAMINATION:   Most Recent Physical Functioning:   Gross Assessment:                  Posture:     Balance:  Sitting - Static: Good (unsupported)  Sitting - Dynamic: Good (unsupported)  Standing - Static: Fair  Standing - Dynamic : Fair Bed Mobility:  Supine to Sit: Minimum assistance  Scooting: Minimum assistance  Wheelchair Mobility:     Transfers:  Sit to Stand: Stand-by assistance  Stand to Sit: Contact guard assistance  Gait:     Base of Support: Center of gravity altered;Shift to left  Speed/Clotilde: Slow  Step Length: Left shortened;Right shortened  Stance: Left decreased;Right decreased  Gait Abnormalities: Hemiplegic  Distance (ft): 80 Feet (ft)( + 40)  Assistive Device: Walker luke  Ambulation - Level of Assistance: Minimal assistance(+ w/c follow)  Interventions: Safety awareness training;Verbal cues      Body Structures Involved:  1. Nerves  2. Voice/Speech  3. Bones  4. Joints  5.  Muscles Body Functions Affected:  1. Mental  2. Sensory/Pain  3. Neuromusculoskeletal  4. Movement Related Activities and Participation Affected:  1. General Tasks and Demands  2. Mobility  3. Self Care  4. Interpersonal Interactions and Relationships   Number of elements that affect the Plan of Care: 4+: HIGH COMPLEXITY   CLINICAL PRESENTATION:   Presentation: Evolving clinical presentation with changing clinical characteristics: MODERATE COMPLEXITY   CLINICAL DECISION MAKIN [facility] Mobility Inpatient Short Form  How much difficulty does the patient currently have. .. Unable A Lot A Little None   1. Turning over in bed (including adjusting bedclothes, sheets and blankets)? [] 1   [] 2   [] 3   [x] 4   2. Sitting down on and standing up from a chair with arms ( e.g., wheelchair, bedside commode, etc.)   [] 1   [] 2   [x] 3   [] 4   3. Moving from lying on back to sitting on the side of the bed? [] 1   [] 2   [x] 3   [] 4   How much help from another person does the patient currently need. .. Total A Lot A Little None   4. Moving to and from a bed to a chair (including a wheelchair)? [] 1   [] 2   [x] 3   [] 4   5. Need to walk in hospital room? [] 1   [] 2   [x] 3   [] 4   6. Climbing 3-5 steps with a railing? [] 1   [x] 2   [] 3   [] 4   © , Trustees of [address] [place] [address], under license to [employer]. All rights reserved      Score:  Initial: 13 Most Recent: 18 (Date:3/9/2020)    Interpretation of Tool:  Represents activities that are increasingly more difficult (i.e. Bed mobility, Transfers, Gait). Medical Necessity:     · Patient is expected to demonstrate progress in   · strength, range of motion, balance, coordination, and functional technique  ·  to   · increase independence with all mobility.    · .  Reason for Services/Other Comments:  · Patient continues to require skilled intervention due to   · medical complications and mobility deficits which impact his level of function, safety, and independence as indicated above. · .   Use of outcome tool(s) and clinical judgement create a POC that gives a: Questionable prediction of patient's progress: MODERATE COMPLEXITY        TREATMENT:      Pre-treatment Symptoms/Complaints:  none  Pain: Initial: 0/10 Post Session:  0/10     Therapeutic Activity: (    23 min): Therapeutic activities including bed mobility and amb with luke walker on level surfaces to improve mobility, strength and balance. Required min Safety awareness training;Verbal cues to promote dynamic balance in standing. Braces/Orthotics/Lines/Etc:   · Sling to L UE throughout transfers and ambulation  Treatment/Session Assessment:    · Response to Treatment:  See above  · Interdisciplinary Collaboration:   o Physical Therapy Assistant  o Registered Nurse  o Rehabilitation Attendant  o Charge RN  · After treatment position/precautions:   o Bed alarm/tab alert on  o Bed/Chair-wheels locked  o Bed in low position  o Call light within reach  o RN notified  o sitting up in w/c   · Compliance with Program/Exercises: Compliant all of the time  · Recommendations/Intent for next treatment session: \"Next visit will focus on advancements to more challenging activities and reduction in assistance provided\".     Total Treatment Duration:  PT Patient Time In/Time Out  Time In: 1509  Time Out: [address] [place], [place]

## 2020-03-11 NOTE — PROGRESS NOTES
Problem: Pressure Injury - Risk of  Goal: *Prevention of pressure injury  Description  Document Dewey Scale and appropriate interventions in the flowsheet. Outcome: Progressing Towards Goal  Note: Pressure Injury Interventions:  Sensory Interventions: Float heels, Assess changes in LOC, Keep linens dry and wrinkle-free, Maintain/enhance activity level    Moisture Interventions: Absorbent underpads, Limit adult briefs, Maintain skin hydration (lotion/cream)    Activity Interventions: Increase time out of bed, Pressure redistribution bed/mattress(bed type), PT/OT evaluation    Mobility Interventions: HOB 30 degrees or less, Pressure redistribution bed/mattress (bed type), PT/OT evaluation    Nutrition Interventions: Document food/fluid/supplement intake, Offer support with meals,snacks and hydration    Friction and Shear Interventions: Lift sheet, HOB 30 degrees or less      Problem: Falls - Risk of  Goal: *Absence of Falls  Description  Document Jeanne Fall Risk and appropriate interventions in the flowsheet.   Outcome: Progressing Towards Goal  Note: Fall Risk Interventions:  Mobility Interventions: Bed/chair exit alarm, Communicate number of staff needed for ambulation/transfer, OT consult for ADLs, Patient to call before getting OOB, PT Consult for mobility concerns, Utilize walker, cane, or other assistive device    Mentation Interventions: Adequate sleep, hydration, pain control, Bed/chair exit alarm, Door open when patient unattended, Toileting rounds, Update white board    Medication Interventions: Bed/chair exit alarm, Evaluate medications/consider consulting pharmacy, Patient to call before getting OOB, Teach patient to arise slowly    Elimination Interventions: Bed/chair exit alarm, Call light in reach, Stay With Me (per policy), Toileting schedule/hourly rounds, Urinal in reach    History of Falls Interventions: Bed/chair exit alarm, Consult care management for discharge planning, Door open when patient unattended, Evaluate medications/consider consulting pharmacy    Problem: Diabetes Self-Management  Goal: *Monitoring blood glucose, interpreting and using results  Description  Identify recommended blood glucose targets  and personal targets.   Outcome: Progressing Towards Goal     Problem: Ischemic Stroke: Discharge Outcomes  Goal: *Absence of DVT(Stroke Metric)  Outcome: Progressing Towards Goal  Goal: *Absence of aspiration  Outcome: Progressing Towards Goal  Goal: *Optimal pain control at patient's stated goal  Outcome: Progressing Towards Goal  Goal: *Neurolgocially stable (absence of additional neurological deficits)  Outcome: Progressing Towards Goal

## 2020-03-12 LAB — GLUCOSE BLD STRIP.AUTO-MCNC: 104 MG/DL (ref 65–100)

## 2020-03-12 PROCEDURE — 74011250637 HC RX REV CODE- 250/637: Performed by: INTERNAL MEDICINE

## 2020-03-12 PROCEDURE — 74011250637 HC RX REV CODE- 250/637: Performed by: HOSPITALIST

## 2020-03-12 PROCEDURE — 82962 GLUCOSE BLOOD TEST: CPT

## 2020-03-12 PROCEDURE — 97530 THERAPEUTIC ACTIVITIES: CPT

## 2020-03-12 PROCEDURE — 74011250636 HC RX REV CODE- 250/636: Performed by: INTERNAL MEDICINE

## 2020-03-12 PROCEDURE — 65270000029 HC RM PRIVATE

## 2020-03-12 RX ADMIN — ASPIRIN 81 MG 81 MG: 81 TABLET ORAL at 08:43

## 2020-03-12 RX ADMIN — Medication 400 MG: at 08:43

## 2020-03-12 RX ADMIN — ATORVASTATIN CALCIUM 80 MG: 80 TABLET, FILM COATED ORAL at 20:32

## 2020-03-12 RX ADMIN — ACETAMINOPHEN 650 MG: 325 TABLET, FILM COATED ORAL at 20:32

## 2020-03-12 RX ADMIN — ACETAMINOPHEN 650 MG: 325 TABLET, FILM COATED ORAL at 13:30

## 2020-03-12 RX ADMIN — METOPROLOL SUCCINATE 25 MG: 25 TABLET, FILM COATED, EXTENDED RELEASE ORAL at 08:43

## 2020-03-12 RX ADMIN — ENOXAPARIN SODIUM 40 MG: 40 INJECTION SUBCUTANEOUS at 13:30

## 2020-03-12 RX ADMIN — Medication 400 MG: at 17:01

## 2020-03-12 RX ADMIN — DIPHENHYDRAMINE HYDROCHLORIDE 25 MG: 25 CAPSULE ORAL at 17:01

## 2020-03-12 RX ADMIN — GUAIFENESIN 100 MG: 100 SOLUTION ORAL at 17:01

## 2020-03-12 RX ADMIN — LISINOPRIL 40 MG: 20 TABLET ORAL at 08:43

## 2020-03-12 RX ADMIN — ACETAMINOPHEN 650 MG: 325 TABLET, FILM COATED ORAL at 05:15

## 2020-03-12 NOTE — PROGRESS NOTES
Problem: Mobility Impaired (Adult and Pediatric)  Goal: *Acute Goals and Plan of Care  Description  GOALS UPDATED ON RE-ASSESSMENT 3/9/2020 (advancements to goals in bold):  1. Patient will perform bed mobility with supervision and 0 verbal cues within 7 treatment days. 2. Patient will perform transfer bed to chair with SUPERVISION within 7 treatment days. 3. Patient will demonstrate fair+ dynamic balance throughout stance phase on L LE within 7 treatment days. 4. Patient will require CGA throughout swing phase on (to advance) L LE within 7 treatment days. 5. Patient demonstrate 3+/5 strength in L LE hip flexion, hip abduction, and hip adduction within 7 treatment days. 6. Patient will ambulate 150ft+ with CGA and least retrictive assistive device within 7 treatment days. 7. Patient will perform wheelchair mobility x75ft with SUPERVISION within 7 treatment days. PHYSICAL THERAPY: Daily Note and PM 3/12/2020  INPATIENT: PT Visit Days : 3  Payor: MEDICAID PENDING / Plan: Irina Casas PENDING / Product Type: Medicaid /       NAME/AGE/GENDER: Gerri Orellana is a 53 y.o. male   PRIMARY DIAGNOSIS: Acute ischemic stroke ([address] [I63.9]  Cerebrovascular accident (CVA) due to embolism ([address] [N02.7] Acute embolic stroke ([address] Acute embolic stroke ([address]       ICD-10: Treatment Diagnosis:   · Difficulty in walking, Not elsewhere classified (R26.2)  · Hemiplegia and hemiparesis following cerebral infarction affecting left non-dominant side (R10.194)   Precaution/Allergies:  Patient has no known allergies. ASSESSMENT:     Mr. Fernando Bell is a 53year old admitted R MCA CVA. Patient was supine upon contact and agreeable to PT. Patient performs supine to sit with min assist and cues for technique. Patient performs SPT to wheelchair with CGA and cues for technique. Patient was taken into hallway where he ambulates 80' x 2 with CGA-min assist and cues for sequencing and quad/glut activation on LLE.  Chair follow for safety. Patient then performs wheelchair mobility x 150' back to his room with SBA, several rest breaks needed, and cues provided for compensatory techniques. Patient fatigued quickly with wheelchair mobility needing rest breaks. Overall good progress towards physical therapy goals. Goals listed above are still appropriate. Will continue efforts as patient is still below functional baseline. This section established at most recent assessment   PROBLEM LIST (Impairments causing functional limitations):  1. Decreased Strength  2. Decreased ADL/Functional Activities  3. Decreased Transfer Abilities  4. Decreased Ambulation Ability/Technique  5. Decreased Balance  6. Increased Pain  7. Decreased Activity Tolerance  8. Increased Fatigue  9. Decreased Flexibility/Joint Mobility   INTERVENTIONS PLANNED: (Benefits and precautions of physical therapy have been discussed with the patient.)  1. Balance Exercise  2. Bed Mobility  3. Family Education  4. Gait Training  5. Home Exercise Program (HEP)  6. Manual Therapy  7. Neuromuscular Re-education/Strengthening  8. Range of Motion (ROM)  9. Therapeutic Activites  10. Therapeutic Exercise/Strengthening  11. Transfer Training     TREATMENT PLAN: Frequency/Duration: 3 times a week for duration of hospital stay  Rehabilitation Potential For Stated Goals: Good     REHAB RECOMMENDATIONS (at time of discharge pending progress):    Placement: It is my opinion, based on this patient's performance to date, that Mr. Davey Stein may benefit from intensive therapy at an [address] [place] [address] after discharge due to a probable need for close medical supervision by a rehab physician, a probable need for multiple therapy disciplines and potential to make ongoing and sustainable functional improvement that is of practical value. .  Equipment:    TBD pending progress with therapy.              HISTORY:   History of Present Injury/Illness (Reason for Referral):  Per H&P: \"Pt is a 54 y/o smoker with DM, HTN, who presented to ER with L leg and arm weakness, L facial droop, dysarthria. First noted L leg weakness late night 12/11 when he woke up to go to the bathroom. He was normal when he went to bed around 1030. Woke up this morning and had persistent weakness L leg and also now noted in L arm. EMS called. Noted to have slurred speech and L facial droop as well. Code S called in ER around 9am.  MRI with acute infarcts in L cerebellar hemisphere, deep frontoparietal white matter, and R paramedian yariel. Also noted old lacunar infarcts. No large vessel occlusion on CTA, but some stenosis noted. No hx afib, TIA, CVA. No CP, palpitations, SOB. \"  Past Medical History/Comorbidities:   Mr. Sherine Joshi  has a past medical history of Acute ischemic stroke ([address] (12/12/2019), Acute pancreatitis (11/19/2014), Cerebrovascular accident (CVA) due to embolism ([address] (12/12/2019), Diabetes ([address] (2002), Diabetes ([address] Diabetes mellitus, and Hypertension. He also has no past medical history of Arthritis, Asthma, Autoimmune disease ([address] CAD (coronary artery disease), Cancer ([address] Chronic kidney disease, COPD, Dementia, Dementia ([address] Heart failure ([address] Ill-defined condition, Infectious disease, Liver disease, Other ill-defined conditions(799.89), Psychiatric disorder, PUD (peptic ulcer disease), Seizures ([address] or Sleep disorder. Mr. Sherine Joshi  has a past surgical history that includes hx hernia repair and hx orthopaedic. Social History/Living Environment:   Home Environment: Apartment  # Steps to Enter: 12  Rails to Enter: Yes  [employer] : Bilateral  One/Two Story Residence: One story  Living Alone: No  Support Systems: Spouse/Significant Other/Partner  Patient Expects to be Discharged to[de-identified] Unknown  Current DME Used/Available at Home: None  Tub or Shower Type: Tub/Shower combination  Prior Level of Function/Work/Activity:  Independent, lives with wife in 2nd story 1 level apartment.  No recent falls.   Number of Personal Factors/Comorbidities that affect the Plan of Care: 1-2: MODERATE COMPLEXITY   EXAMINATION:   Most Recent Physical Functioning:   Gross Assessment:                  Posture:     Balance:  Sitting: Intact  Standing: Impaired  Standing - Static: Fair  Standing - Dynamic : Fair Bed Mobility:  Supine to Sit: Minimum assistance  Sit to Supine: Stand-by assistance  Wheelchair Mobility:     Transfers:  Sit to Stand: Contact guard assistance  Stand to Sit: Contact guard assistance  Gait:     Base of Support: Center of gravity altered;Shift to right  Speed/Clotilde: Slow  Step Length: Left shortened;Right shortened  Gait Abnormalities: Hemiplegic  Distance (ft): 90 Feet (ft)(x2)  Assistive Device: Walker luke  Ambulation - Level of Assistance: Minimal assistance;Contact guard assistance(+chair follow)  Interventions: Safety awareness training; Tactile cues; Verbal cues      Body Structures Involved:  1. Nerves  2. Voice/Speech  3. Bones  4. Joints  5. Muscles Body Functions Affected:  1. Mental  2. Sensory/Pain  3. Neuromusculoskeletal  4. Movement Related Activities and Participation Affected:  1. General Tasks and Demands  2. Mobility  3. Self Care  4. Interpersonal Interactions and Relationships   Number of elements that affect the Plan of Care: 4+: HIGH COMPLEXITY   CLINICAL PRESENTATION:   Presentation: Evolving clinical presentation with changing clinical characteristics: MODERATE COMPLEXITY   CLINICAL DECISION MAKIN [facility] Inpatient Short Form  How much difficulty does the patient currently have. .. Unable A Lot A Little None   1. Turning over in bed (including adjusting bedclothes, sheets and blankets)? [] 1   [] 2   [] 3   [x] 4   2. Sitting down on and standing up from a chair with arms ( e.g., wheelchair, bedside commode, etc.)   [] 1   [] 2   [x] 3   [] 4   3. Moving from lying on back to sitting on the side of the bed?    [] 1   [] 2 [x] 3   [] 4   How much help from another person does the patient currently need. .. Total A Lot A Little None   4. Moving to and from a bed to a chair (including a wheelchair)? [] 1   [] 2   [x] 3   [] 4   5. Need to walk in hospital room? [] 1   [] 2   [x] 3   [] 4   6. Climbing 3-5 steps with a railing? [] 1   [x] 2   [] 3   [] 4   © 2007, Trustees of [address] [place] [address] [address] [id number], under license to [employer]. All rights reserved      Score:  Initial: 13 Most Recent: 18 (Date:3/9/2020)    Interpretation of Tool:  Represents activities that are increasingly more difficult (i.e. Bed mobility, Transfers, Gait). Medical Necessity:     · Patient is expected to demonstrate progress in   · strength, range of motion, balance, coordination, and functional technique  ·  to   · increase independence with all mobility. · .  Reason for Services/Other Comments:  · Patient continues to require skilled intervention due to   · medical complications and mobility deficits which impact his level of function, safety, and independence as indicated above. · .   Use of outcome tool(s) and clinical judgement create a POC that gives a: Questionable prediction of patient's progress: MODERATE COMPLEXITY        TREATMENT:      Pre-treatment Symptoms/Complaints:  none  Pain: Initial: 0/10 Post Session:  0/10     Therapeutic Activity: (    32 Minutes): Therapeutic activities including bed mobility training, transfer training, ambulation on level ground, instruction in sequencing with luke walker, wheelchair mobility, scooting, SPT training, and patient education  to improve mobility, strength and balance. Required moderate Safety awareness training; Tactile cues; Verbal cues to promote static and dynamic balance in standing and promote coordination of bilateral, lower extremity(s).            Braces/Orthotics/Lines/Etc:   · Sling to L UE throughout transfers and ambulation  Treatment/Session Assessment:    · Response to Treatment:  See above  · Interdisciplinary Collaboration:   o Physical Therapy Assistant  o Registered Nurse  o Rehabilitation Attendant  · After treatment position/precautions:   o Supine in bed  o Bed/Chair-wheels locked  o Bed in low position  o Call light within reach  o RN notified   · Compliance with Program/Exercises: Compliant all of the time  · Recommendations/Intent for next treatment session: \"Next visit will focus on advancements to more challenging activities and reduction in assistance provided\".     Total Treatment Duration:  PT Patient Time In/Time Out  Time In: 1401  Time Out: 115 [facility]

## 2020-03-12 NOTE — PROGRESS NOTES
Problem: Patient Education: Go to Patient Education Activity  Goal: Patient/Family Education  Outcome: Progressing Towards Goal     Problem: Pressure Injury - Risk of  Goal: *Prevention of pressure injury  Description: Document Dewey Scale and appropriate interventions in the flowsheet. Outcome: Progressing Towards Goal  Note: Pressure Injury Interventions:  Sensory Interventions: Assess changes in LOC, Avoid rigorous massage over bony prominences    Moisture Interventions: Absorbent underpads, Check for incontinence Q2 hours and as needed    Activity Interventions: Increase time out of bed, Pressure redistribution bed/mattress(bed type), PT/OT evaluation    Mobility Interventions: HOB 30 degrees or less, Pressure redistribution bed/mattress (bed type), PT/OT evaluation    Nutrition Interventions: Document food/fluid/supplement intake, Offer support with meals,snacks and hydration    Friction and Shear Interventions: HOB 30 degrees or less                Problem: Patient Education: Go to Patient Education Activity  Goal: Patient/Family Education  Outcome: Progressing Towards Goal     Problem: Falls - Risk of  Goal: *Absence of Falls  Description: Document Jeanne Fall Risk and appropriate interventions in the flowsheet.   Outcome: Progressing Towards Goal  Note: Fall Risk Interventions:  Mobility Interventions: Bed/chair exit alarm, Communicate number of staff needed for ambulation/transfer, Patient to call before getting OOB    Mentation Interventions: Adequate sleep, hydration, pain control, Bed/chair exit alarm, Door open when patient unattended, Toileting rounds, Update white board    Medication Interventions: Bed/chair exit alarm, Patient to call before getting OOB, Teach patient to arise slowly    Elimination Interventions: Bed/chair exit alarm, Call light in reach, Patient to call for help with toileting needs, Stay With Me (per policy), Toilet paper/wipes in reach, Toileting schedule/hourly rounds    History of Falls Interventions: Door open when patient unattended, Bed/chair exit alarm, Consult care management for discharge planning, Evaluate medications/consider consulting pharmacy, Investigate reason for fall, Room close to nurse's station         Problem: Patient Education: Go to Patient Education Activity  Goal: Patient/Family Education  Outcome: Progressing Towards Goal     Problem: Diabetes Self-Management  Goal: *Disease process and treatment process  Description: Define diabetes and identify own type of diabetes; list 3 options for treating diabetes. Outcome: Progressing Towards Goal  Goal: *Incorporating nutritional management into lifestyle  Description: Describe effect of type, amount and timing of food on blood glucose; list 3 methods for planning meals. Outcome: Progressing Towards Goal  Goal: *Incorporating physical activity into lifestyle  Description: State effect of exercise on blood glucose levels. Outcome: Progressing Towards Goal  Goal: *Developing strategies to promote health/change behavior  Description: Define the ABC's of diabetes; identify appropriate screenings, schedule and personal plan for screenings. Outcome: Progressing Towards Goal  Goal: *Using medications safely  Description: State effect of diabetes medications on diabetes; name diabetes medication taking, action and side effects. Outcome: Progressing Towards Goal  Goal: *Monitoring blood glucose, interpreting and using results  Description: Identify recommended blood glucose targets  and personal targets. Outcome: Progressing Towards Goal  Goal: *Prevention, detection, treatment of acute complications  Description: List symptoms of hyper- and hypoglycemia; describe how to treat low blood sugar and actions for lowering  high blood glucose level.   Outcome: Progressing Towards Goal  Goal: *Prevention, detection and treatment of chronic complications  Description: Define the natural course of diabetes and describe the relationship of blood glucose levels to long term complications of diabetes.   Outcome: Progressing Towards Goal  Goal: *Developing strategies to address psychosocial issues  Description: Describe feelings about living with diabetes; identify support needed and support network  Outcome: Progressing Towards Goal     Problem: Patient Education: Go to Patient Education Activity  Goal: Patient/Family Education  Outcome: Progressing Towards Goal     Problem: Patient Education: Go to Patient Education Activity  Goal: Patient/Family Education  Outcome: Progressing Towards Goal     Problem: Nutrition Deficit  Goal: *Optimize nutritional status  Outcome: Progressing Towards Goal     Problem: Patient Education: Go to Patient Education Activity  Goal: Patient/Family Education  Outcome: Progressing Towards Goal     Problem: General Medical Care Plan  Goal: *Vital signs within specified parameters  Outcome: Progressing Towards Goal  Goal: *Labs within defined limits  Outcome: Progressing Towards Goal  Goal: *Absence of infection signs and symptoms  Description: Wash hand more often   Outcome: Progressing Towards Goal  Goal: *Optimal pain control at patient's stated goal  Outcome: Progressing Towards Goal  Goal: *Skin integrity maintained  Outcome: Progressing Towards Goal  Goal: *Fluid volume balance  Outcome: Progressing Towards Goal  Goal: *Optimize nutritional status  Outcome: Progressing Towards Goal  Goal: *Anxiety reduced or absent  Outcome: Progressing Towards Goal  Goal: *Progressive mobility and function (eg: ADL's)  Outcome: Progressing Towards Goal     Problem: Patient Education: Go to Patient Education Activity  Goal: Patient/Family Education  Outcome: Progressing Towards Goal     Problem: Patient Education: Go to Patient Education Activity  Goal: Patient/Family Education  Outcome: Progressing Towards Goal     Problem: Patient Education: Go to Patient Education Activity  Goal: Patient/Family Education  Outcome: Progressing Towards Goal     Problem: Patient Education: Go to Patient Education Activity  Goal: Patient/Family Education  Outcome: Progressing Towards Goal     Problem: Ischemic Stroke: Discharge Outcomes  Goal: *Verbalizes anxiety and depression are reduced or absent  Outcome: Progressing Towards Goal  Goal: *Verbalize understanding of risk factor modification(Stroke Metric)  Outcome: Progressing Towards Goal  Goal: *Hemodynamically stable  Outcome: Progressing Towards Goal  Goal: *Absence of aspiration pneumonia  Outcome: Progressing Towards Goal  Goal: *Aware of needed dietary changes  Outcome: Progressing Towards Goal  Goal: *Verbalize understanding of prescribed medications including anti-coagulants, anti-lipid, and/or anti-platelets(Stroke Metric)  Outcome: Progressing Towards Goal  Goal: *Tolerating diet  Outcome: Progressing Towards Goal  Goal: *Aware of follow-up diagnostics related to anticoagulants  Outcome: Progressing Towards Goal  Goal: *Ability to perform ADLs and demonstrates progressive mobility and function  Outcome: Progressing Towards Goal  Goal: *Absence of DVT(Stroke Metric)  Outcome: Progressing Towards Goal  Goal: *Absence of aspiration  Outcome: Progressing Towards Goal  Goal: *Optimal pain control at patient's stated goal  Outcome: Progressing Towards Goal  Goal: *Home safety concerns addressed  Outcome: Progressing Towards Goal  Goal: *Describes available resources and support systems  Outcome: Progressing Towards Goal  Goal: *Verbalizes understanding of activation of EMS(911) for stroke symptoms(Stroke Metric)  Outcome: Progressing Towards Goal  Goal: *Understands and describes signs and symptoms to report to providers(Stroke Metric)  Outcome: Progressing Towards Goal  Goal: *Neurolgocially stable (absence of additional neurological deficits)  Outcome: Progressing Towards Goal  Goal: *Verbalizes importance of follow-up with primary care physician(Stroke Metric)  Outcome: Progressing Towards Goal  Goal: *Smoking cessation discussed,if applicable(Stroke Metric)  Outcome: Progressing Towards Goal  Goal: *Depression screening completed(Stroke Metric)  Outcome: Progressing Towards Goal     Problem: Pain  Goal: *Control of Pain  Outcome: Progressing Towards Goal     Problem: Patient Education: Go to Patient Education Activity  Goal: Patient/Family Education  Outcome: Progressing Towards Goal     Problem: Patient Education: Go to Patient Education Activity  Goal: Patient/Family Education  Outcome: Progressing Towards Goal

## 2020-03-12 NOTE — INTERDISCIPLINARY ROUNDS
Interdisciplinary team rounds were held 3/12/2020 with the following team members:  Care Management, Physical Therapy, Physician and . Plan of care discussed. See clinical pathway and/or care plan for interventions and desired outcomes.

## 2020-03-12 NOTE — PROGRESS NOTES
03/12/20 0714   NIH Stroke Scale   Interval Other (comment)   LOC 0   LOC Questions 0   LOC Commands 0   Best Gaze 0   Visual 0   Facial Palsy 1   Motor Right Arm 0   Motor Left Arm 2   Motor Right Leg 0   Motor Left Leg 2   Limb Ataxia 0   Sensory 0   Best Language 1   Dysarthria 1   Extinction and Inattention 0   Total 7   Dual NIH w/ Jose Maya

## 2020-03-12 NOTE — PROGRESS NOTES
Hospitalist Progress Note    3/12/2020  Admit Date: 2019  9:07 AM   NAME: Scarlet Tellez   :  1975   MRN:  [id number]   Attending: Lindsay Adorno MD  PCP:  Jessica Wlil MD    SUBJECTIVE:   58-year-old hypertensive diabetic male presented to the ER with left-sided weakness left facial droop. Acute infarct left cerebellar hemisphere and deep frontoparietal white matter abnormalities as well as right paramedian yariel abnormalities. Old lacunar infarcts noted. No large vessel occlusion on CTA. He has been hospitalized for 87 days awaiting difficult rehab placement. Receiving physical occupational therapy and speech therapy. Tolerating high-dose statin atorvastatin and antiplatelet. PFO noted on ISMAEL 4-week event monitor planned as outpatient and if no dysrhythmia outpatient PFO closure recommended. Patient has no new complaints except awaiting placement. Current Hospitalization >90 days.     3/12: Patient seen and examined. Feels better. No new symptoms. Nursing notes and chart reviewed. Review of Systems negative with exception of pertinent positives noted above. PHYSICAL EXAM     Visit Vitals  BP (!) 159/99 (BP 1 Location: Right arm, BP Patient Position: At rest) Comment: reported to RN-Zulema   Pulse 63   Temp 97.8 °F (36.6 °C)   Resp 16   Ht 5' 6\" (1.676 m)   Wt 95.3 kg (210 lb)   SpO2 97%   BMI 33.89 kg/m²      Temp (24hrs), Av °F (36.7 °C), Min:97.8 °F (36.6 °C), Max:98.2 °F (36.8 °C)    Oxygen Therapy  O2 Sat (%): 97 % (20 0800)  Pulse via Oximetry: 66 beats per minute (20 0000)  O2 Device: Room air (20 0000)    Intake/Output Summary (Last 24 hours) at 3/12/2020 1119  Last data filed at 3/12/2020 0823  Gross per 24 hour   Intake 650 ml   Output    Net 650 ml         General: No acute distress. Alert.    Head:  AT/NC  Lungs:  CTABL.    Heart:  RRR, no murmur, rub, or gallop  Abdomen: Soft, non-distended, non-tender, +bs  Extremities: No cyanosis or clubbing. Neurologic:   left hemiparesis  Skin:  No Obvious Rash  Psych:  Normal affect. LABS AND STUDIES:  Personally reviewed all labs, meds, and studies for past 24hrs. ASSESSMENT      Active Hospital Problems    Diagnosis Date Noted    Hypomagnesemia 03/07/2020    PFO (patent foramen ovale) 12/17/2019    Arrhythmia 12/15/2019    Hyperlipemia 76/95/7705    Acute embolic stroke ([facility] [address] 27/02/3148    Type 2 diabetes mellitus (HCC)     Hypertension            PLAN:  Acute embolic stroke  -MRI with acute infarcts in L cerebellar hemisphere, deep frontoparietal white matter, and R paramedian yariel. old lacunar infarcts. Continue aspirin and high-dose atorvastatin  -ISMAEL: 3 mm PFO, Cardiology stated he needs an evaluation for closure PFO with high risk features including significant (3 mm) separation of septum secundum and primum as well as large shunt. Cards recommend outpatient event monitoring 4 weeks and then schedule shunt closure if no dysrhythmia.     Hypomagnesemia              Received IV, and on oral supplementation.      Hypertension  Continue metoprolol and ACE inhibitorcontrolled     Type 2 diabetes mellitus:    Discontinued metformin and observing with diabetic diet only        Left upper extremity pain  As needed Tylenol.     Allergies  As needed antihistamine diphenhydramine this is also unchanged        DISPO: waiting for pt to be deemed \"disabled\" which in neurologic cases have to allow for 6 months showing chronicity.  Then he can get medicaid after that.  Medically stable for discharge.  on board. DVT ppx:  lovenox  Discussed plan with pt who is in agreement. All questions answered.     Signed By: Mike Bhardwaj MD     March 12, 2020

## 2020-03-13 PROCEDURE — 74011250637 HC RX REV CODE- 250/637: Performed by: INTERNAL MEDICINE

## 2020-03-13 PROCEDURE — 74011250637 HC RX REV CODE- 250/637: Performed by: HOSPITALIST

## 2020-03-13 PROCEDURE — 74011250636 HC RX REV CODE- 250/636: Performed by: INTERNAL MEDICINE

## 2020-03-13 PROCEDURE — 65270000029 HC RM PRIVATE

## 2020-03-13 RX ADMIN — LISINOPRIL 40 MG: 20 TABLET ORAL at 08:19

## 2020-03-13 RX ADMIN — Medication 400 MG: at 08:19

## 2020-03-13 RX ADMIN — ACETAMINOPHEN 650 MG: 325 TABLET, FILM COATED ORAL at 05:01

## 2020-03-13 RX ADMIN — ACETAMINOPHEN 650 MG: 325 TABLET, FILM COATED ORAL at 13:35

## 2020-03-13 RX ADMIN — ACETAMINOPHEN 650 MG: 325 TABLET, FILM COATED ORAL at 21:01

## 2020-03-13 RX ADMIN — GUAIFENESIN 100 MG: 100 SOLUTION ORAL at 17:15

## 2020-03-13 RX ADMIN — ENOXAPARIN SODIUM 40 MG: 40 INJECTION SUBCUTANEOUS at 13:35

## 2020-03-13 RX ADMIN — Medication 400 MG: at 17:15

## 2020-03-13 RX ADMIN — ASPIRIN 81 MG 81 MG: 81 TABLET ORAL at 08:19

## 2020-03-13 RX ADMIN — DIPHENHYDRAMINE HYDROCHLORIDE 25 MG: 25 CAPSULE ORAL at 17:15

## 2020-03-13 RX ADMIN — METOPROLOL SUCCINATE 25 MG: 25 TABLET, FILM COATED, EXTENDED RELEASE ORAL at 08:19

## 2020-03-13 RX ADMIN — ATORVASTATIN CALCIUM 80 MG: 80 TABLET, FILM COATED ORAL at 21:01

## 2020-03-13 NOTE — PROGRESS NOTES
Problem: Patient Education: Go to Patient Education Activity  Goal: Patient/Family Education  Outcome: Progressing Towards Goal     Problem: Pressure Injury - Risk of  Goal: *Prevention of pressure injury  Description: Document Dewey Scale and appropriate interventions in the flowsheet. Outcome: Progressing Towards Goal  Note: Pressure Injury Interventions:  Sensory Interventions: Assess changes in LOC, Avoid rigorous massage over bony prominences    Moisture Interventions: Absorbent underpads, Check for incontinence Q2 hours and as needed    Activity Interventions: Increase time out of bed, Pressure redistribution bed/mattress(bed type), PT/OT evaluation    Mobility Interventions: HOB 30 degrees or less, Pressure redistribution bed/mattress (bed type), PT/OT evaluation    Nutrition Interventions: Document food/fluid/supplement intake, Offer support with meals,snacks and hydration    Friction and Shear Interventions: HOB 30 degrees or less                Problem: Patient Education: Go to Patient Education Activity  Goal: Patient/Family Education  Outcome: Progressing Towards Goal     Problem: Falls - Risk of  Goal: *Absence of Falls  Description: Document Jeanne Fall Risk and appropriate interventions in the flowsheet.   Outcome: Progressing Towards Goal  Note: Fall Risk Interventions:  Mobility Interventions: Bed/chair exit alarm, Communicate number of staff needed for ambulation/transfer, Patient to call before getting OOB    Mentation Interventions: Adequate sleep, hydration, pain control, Bed/chair exit alarm, Door open when patient unattended, Toileting rounds, Update white board    Medication Interventions: Bed/chair exit alarm, Patient to call before getting OOB, Teach patient to arise slowly    Elimination Interventions: Bed/chair exit alarm, Call light in reach, Patient to call for help with toileting needs, Stay With Me (per policy), Toilet paper/wipes in reach, Toileting schedule/hourly rounds    History of Falls Interventions: Bed/chair exit alarm, Consult care management for discharge planning, Door open when patient unattended, Investigate reason for fall         Problem: Patient Education: Go to Patient Education Activity  Goal: Patient/Family Education  Outcome: Progressing Towards Goal     Problem: Diabetes Self-Management  Goal: *Disease process and treatment process  Description: Define diabetes and identify own type of diabetes; list 3 options for treating diabetes. Outcome: Progressing Towards Goal  Goal: *Incorporating nutritional management into lifestyle  Description: Describe effect of type, amount and timing of food on blood glucose; list 3 methods for planning meals. Outcome: Progressing Towards Goal  Goal: *Incorporating physical activity into lifestyle  Description: State effect of exercise on blood glucose levels. Outcome: Progressing Towards Goal  Goal: *Developing strategies to promote health/change behavior  Description: Define the ABC's of diabetes; identify appropriate screenings, schedule and personal plan for screenings. Outcome: Progressing Towards Goal  Goal: *Using medications safely  Description: State effect of diabetes medications on diabetes; name diabetes medication taking, action and side effects. Outcome: Progressing Towards Goal  Goal: *Monitoring blood glucose, interpreting and using results  Description: Identify recommended blood glucose targets  and personal targets. Outcome: Progressing Towards Goal  Goal: *Prevention, detection, treatment of acute complications  Description: List symptoms of hyper- and hypoglycemia; describe how to treat low blood sugar and actions for lowering  high blood glucose level.   Outcome: Progressing Towards Goal  Goal: *Prevention, detection and treatment of chronic complications  Description: Define the natural course of diabetes and describe the relationship of blood glucose levels to long term complications of diabetes.   Outcome: Progressing Towards Goal  Goal: *Developing strategies to address psychosocial issues  Description: Describe feelings about living with diabetes; identify support needed and support network  Outcome: Progressing Towards Goal     Problem: Patient Education: Go to Patient Education Activity  Goal: Patient/Family Education  Outcome: Progressing Towards Goal     Problem: Patient Education: Go to Patient Education Activity  Goal: Patient/Family Education  Outcome: Progressing Towards Goal     Problem: Nutrition Deficit  Goal: *Optimize nutritional status  Outcome: Progressing Towards Goal     Problem: Patient Education: Go to Patient Education Activity  Goal: Patient/Family Education  Outcome: Progressing Towards Goal     Problem: General Medical Care Plan  Goal: *Vital signs within specified parameters  Outcome: Progressing Towards Goal  Goal: *Labs within defined limits  Outcome: Progressing Towards Goal  Goal: *Absence of infection signs and symptoms  Description: Wash hand more often   Outcome: Progressing Towards Goal  Goal: *Optimal pain control at patient's stated goal  Outcome: Progressing Towards Goal  Goal: *Skin integrity maintained  Outcome: Progressing Towards Goal  Goal: *Fluid volume balance  Outcome: Progressing Towards Goal  Goal: *Optimize nutritional status  Outcome: Progressing Towards Goal  Goal: *Anxiety reduced or absent  Outcome: Progressing Towards Goal  Goal: *Progressive mobility and function (eg: ADL's)  Outcome: Progressing Towards Goal     Problem: Patient Education: Go to Patient Education Activity  Goal: Patient/Family Education  Outcome: Progressing Towards Goal     Problem: Patient Education: Go to Patient Education Activity  Goal: Patient/Family Education  Outcome: Progressing Towards Goal     Problem: Patient Education: Go to Patient Education Activity  Goal: Patient/Family Education  Outcome: Progressing Towards Goal     Problem: Patient Education: Go to Patient Education Activity  Goal: Patient/Family Education  Outcome: Progressing Towards Goal     Problem: Ischemic Stroke: Discharge Outcomes  Goal: *Verbalizes anxiety and depression are reduced or absent  Outcome: Progressing Towards Goal  Goal: *Verbalize understanding of risk factor modification(Stroke Metric)  Outcome: Progressing Towards Goal  Goal: *Hemodynamically stable  Outcome: Progressing Towards Goal  Goal: *Absence of aspiration pneumonia  Outcome: Progressing Towards Goal  Goal: *Aware of needed dietary changes  Outcome: Progressing Towards Goal  Goal: *Verbalize understanding of prescribed medications including anti-coagulants, anti-lipid, and/or anti-platelets(Stroke Metric)  Outcome: Progressing Towards Goal  Goal: *Tolerating diet  Outcome: Progressing Towards Goal  Goal: *Aware of follow-up diagnostics related to anticoagulants  Outcome: Progressing Towards Goal  Goal: *Ability to perform ADLs and demonstrates progressive mobility and function  Outcome: Progressing Towards Goal  Goal: *Absence of DVT(Stroke Metric)  Outcome: Progressing Towards Goal  Goal: *Absence of aspiration  Outcome: Progressing Towards Goal  Goal: *Optimal pain control at patient's stated goal  Outcome: Progressing Towards Goal  Goal: *Home safety concerns addressed  Outcome: Progressing Towards Goal  Goal: *Describes available resources and support systems  Outcome: Progressing Towards Goal  Goal: *Verbalizes understanding of activation of EMS(911) for stroke symptoms(Stroke Metric)  Outcome: Progressing Towards Goal  Goal: *Understands and describes signs and symptoms to report to providers(Stroke Metric)  Outcome: Progressing Towards Goal  Goal: *Neurolgocially stable (absence of additional neurological deficits)  Outcome: Progressing Towards Goal  Goal: *Verbalizes importance of follow-up with primary care physician(Stroke Metric)  Outcome: Progressing Towards Goal  Goal: *Smoking cessation discussed,if applicable(Stroke Metric)  Outcome: Progressing Towards Goal  Goal: *Depression screening completed(Stroke Metric)  Outcome: Progressing Towards Goal     Problem: Pain  Goal: *Control of Pain  Outcome: Progressing Towards Goal     Problem: Patient Education: Go to Patient Education Activity  Goal: Patient/Family Education  Outcome: Progressing Towards Goal     Problem: Patient Education: Go to Patient Education Activity  Goal: Patient/Family Education  Outcome: Progressing Towards Goal

## 2020-03-13 NOTE — PROGRESS NOTES
Reviewed notes for spiritual concerns prior to visit  Visit with patient to build rapport with .   Calm  Encouraged with presence and words of hope  His words sound clearer - that's good        Maycol Hilario,  Staff   C: [phone]  /  [email or web address]

## 2020-03-13 NOTE — PROGRESS NOTES
Hospitalist Progress Note    3/13/2020  Admit Date: 2019  9:07 AM   NAME: Emmanuel Lloyd   :  1975   MRN:  [id number]   Attending: Ruth Henriquez MD  PCP:  Nick Schofield MD    SUBJECTIVE:   57-year-old hypertensive diabetic male presented to the ER with left-sided weakness left facial droop. Acute infarct left cerebellar hemisphere and deep frontoparietal white matter abnormalities as well as right paramedian yariel abnormalities. Old lacunar infarcts noted. No large vessel occlusion on CTA. He has been hospitalized for 87 days awaiting difficult rehab placement. Receiving physical occupational therapy and speech therapy. Tolerating high-dose statin atorvastatin and antiplatelet. PFO noted on ISMAEL 4-week event monitor planned as outpatient and if no dysrhythmia outpatient PFO closure recommended. Patient has no new complaints except awaiting placement. Current Hospitalization >90 days.     3/13: Patient seen and examined. Feels the same. No new neuro symptoms. Nursing notes and chart reviewed. Review of Systems negative with exception of pertinent positives noted above. PHYSICAL EXAM     Visit Vitals  /78 (BP 1 Location: Right arm, BP Patient Position: At rest)   Pulse 71   Temp 97.5 °F (36.4 °C)   Resp 18   Ht 5' 6\" (1.676 m)   Wt 95.3 kg (210 lb)   SpO2 94%   BMI 33.89 kg/m²      Temp (24hrs), Av °F (36.7 °C), Min:97.5 °F (36.4 °C), Max:98.4 °F (36.9 °C)    Oxygen Therapy  O2 Sat (%): 94 % (20 0800)  Pulse via Oximetry: 66 beats per minute (20 0000)  O2 Device: Room air (20 0000)  No intake or output data in the 24 hours ending 20 0938      General: No acute distress. Alert.    Head:  AT/NC  Lungs:  CTABL. Heart:  RRR, no murmur, rub, or gallop  Abdomen: Soft, non-distended, non-tender, +bs  Extremities: No cyanosis or clubbing. Neurologic:   left hemiparesis  Skin:  No Obvious Rash  Psych:  Normal affect.      LABS AND STUDIES:  Personally reviewed all labs, meds, and studies for past 24hrs. ASSESSMENT      Active Hospital Problems    Diagnosis Date Noted    Hypomagnesemia 03/07/2020    PFO (patent foramen ovale) 12/17/2019    Arrhythmia 12/15/2019    Hyperlipemia 10/84/0394    Acute embolic stroke ([facility] [address] 96/95/3293    Type 2 diabetes mellitus (HCC)     Hypertension            PLAN:  Acute embolic stroke  -MRI with acute infarcts in L cerebellar hemisphere, deep frontoparietal white matter, and R paramedian yariel. old lacunar infarcts. Continue aspirin and high-dose atorvastatin  -ISMAEL: 3 mm PFO, Cardiology stated he needs an evaluation for closure PFO with high risk features including significant (3 mm) separation of septum secundum and primum as well as large shunt. Cards recommend outpatient event monitoring 4 weeks and then schedule shunt closure if no dysrhythmia.     Hypomagnesemia              Received IV, and on oral supplementation.      Hypertension  Continue metoprolol and ACE inhibitorcontrolled     Type 2 diabetes mellitus:    Discontinued metformin and observing with diabetic diet only        Left upper extremity pain  As needed Tylenol.     Allergies  As needed antihistamine diphenhydramine this is also unchanged        DISPO: waiting for pt to be deemed \"disabled\" which in neurologic cases have to allow for 6 months showing chronicity.  Then he can get medicaid after that.  Medically stable for discharge.  on board. DVT ppx:  lovenox  Discussed plan with pt who is in agreement. All questions answered.     Signed By: Meir Santos MD     March 13, 2020

## 2020-03-14 LAB — GLUCOSE BLD STRIP.AUTO-MCNC: 149 MG/DL (ref 65–100)

## 2020-03-14 PROCEDURE — 74011250637 HC RX REV CODE- 250/637: Performed by: INTERNAL MEDICINE

## 2020-03-14 PROCEDURE — 74011250636 HC RX REV CODE- 250/636: Performed by: INTERNAL MEDICINE

## 2020-03-14 PROCEDURE — 65270000029 HC RM PRIVATE

## 2020-03-14 PROCEDURE — 74011250637 HC RX REV CODE- 250/637: Performed by: HOSPITALIST

## 2020-03-14 PROCEDURE — 82962 GLUCOSE BLOOD TEST: CPT

## 2020-03-14 RX ADMIN — METOPROLOL SUCCINATE 25 MG: 25 TABLET, FILM COATED, EXTENDED RELEASE ORAL at 08:58

## 2020-03-14 RX ADMIN — Medication 400 MG: at 08:58

## 2020-03-14 RX ADMIN — Medication 400 MG: at 17:58

## 2020-03-14 RX ADMIN — GUAIFENESIN 100 MG: 100 SOLUTION ORAL at 15:11

## 2020-03-14 RX ADMIN — ACETAMINOPHEN 650 MG: 325 TABLET, FILM COATED ORAL at 15:11

## 2020-03-14 RX ADMIN — DIPHENHYDRAMINE HYDROCHLORIDE 25 MG: 25 CAPSULE ORAL at 17:58

## 2020-03-14 RX ADMIN — ENOXAPARIN SODIUM 40 MG: 40 INJECTION SUBCUTANEOUS at 15:11

## 2020-03-14 RX ADMIN — LISINOPRIL 40 MG: 20 TABLET ORAL at 08:58

## 2020-03-14 RX ADMIN — ACETAMINOPHEN 650 MG: 325 TABLET, FILM COATED ORAL at 22:38

## 2020-03-14 RX ADMIN — ACETAMINOPHEN 650 MG: 325 TABLET, FILM COATED ORAL at 05:03

## 2020-03-14 RX ADMIN — ATORVASTATIN CALCIUM 80 MG: 80 TABLET, FILM COATED ORAL at 22:38

## 2020-03-14 RX ADMIN — ASPIRIN 81 MG 81 MG: 81 TABLET ORAL at 08:58

## 2020-03-14 NOTE — PROGRESS NOTES
Hospitalist Progress Note    3/14/2020  Admit Date: 2019  9:07 AM   NAME: Kervin Milian   :  1975   MRN:  [id number]   Attending: Ana Rodriguez MD  PCP:  Danii Ramos MD    SUBJECTIVE:   49-year-old hypertensive diabetic male presented to the ER with left-sided weakness left facial droop. Acute infarct left cerebellar hemisphere and deep frontoparietal white matter abnormalities as well as right paramedian yariel abnormalities. Old lacunar infarcts noted. No large vessel occlusion on CTA. He has been hospitalized for 87 days awaiting difficult rehab placement. Receiving physical occupational therapy and speech therapy. Tolerating high-dose statin atorvastatin and antiplatelet. PFO noted on ISMAEL 4-week event monitor planned as outpatient and if no dysrhythmia outpatient PFO closure recommended. Patient has no new complaints except awaiting placement. Current Hospitalization >90 days.     3/13: Patient seen and examined. Feels the same. No new neuro symptoms. Nursing notes and chart reviewed. Review of Systems negative with exception of pertinent positives noted above. PHYSICAL EXAM     Visit Vitals  /77 (BP 1 Location: Right arm, BP Patient Position: At rest)   Pulse 76   Temp 98.1 °F (36.7 °C)   Resp 18   Ht 5' 6\" (1.676 m)   Wt 95.3 kg (210 lb)   SpO2 96%   BMI 33.89 kg/m²      Temp (24hrs), Av °F (36.7 °C), Min:97.7 °F (36.5 °C), Max:98.3 °F (36.8 °C)    Oxygen Therapy  O2 Sat (%): 96 % (20 0725)  Pulse via Oximetry: 66 beats per minute (20 0000)  O2 Device: Room air (20 0000)  No intake or output data in the 24 hours ending 20 1057      General: No acute distress. Alert.    Head:  AT/NC  Lungs:  CTABL. Heart:  RRR, no murmur, rub, or gallop  Abdomen: Soft, non-distended, non-tender, +bs  Extremities: No cyanosis or clubbing. Neurologic:   left hemiparesis  Skin:  No Obvious Rash  Psych:  Normal affect.      LABS AND STUDIES:  Personally reviewed all labs, meds, and studies for past 24hrs. ASSESSMENT      Active Hospital Problems    Diagnosis Date Noted    Hypomagnesemia 03/07/2020    PFO (patent foramen ovale) 12/17/2019    Arrhythmia 12/15/2019    Hyperlipemia 62/50/2745    Acute embolic stroke ([facility] [address] 67/06/1451    Type 2 diabetes mellitus (HCC)     Hypertension            PLAN:  Acute embolic stroke  -MRI with acute infarcts in L cerebellar hemisphere, deep frontoparietal white matter, and R paramedian yariel. old lacunar infarcts. Continue aspirin and high-dose atorvastatin  -ISMAEL: 3 mm PFO, Cardiology stated he needs an evaluation for closure PFO with high risk features including significant (3 mm) separation of septum secundum and primum as well as large shunt. Cards recommend outpatient event monitoring 4 weeks and then schedule shunt closure if no dysrhythmia.     Hypomagnesemia              Received IV, and on oral supplementation.      Hypertension  Continue metoprolol and ACE inhibitorcontrolled     Type 2 diabetes mellitus:    Discontinued metformin and observing with diabetic diet only        Left upper extremity pain  As needed Tylenol.     Allergies  As needed antihistamine diphenhydramine this is also unchanged        DISPO: waiting for pt to be deemed \"disabled\" which in neurologic cases have to allow for 6 months showing chronicity.  Then he can get medicaid after that.  Medically stable for discharge.  on board. DVT ppx:  lovenox  Discussed plan with pt who is in agreement. All questions answered. We plan to see patient every other day starting from today.     Signed By: Carrie Coughlin MD     March 14, 2020

## 2020-03-14 NOTE — PROGRESS NOTES
Reviewed notes for spiritual concerns prior to visit  Visit with patient to build rapport with . Calm  Encouraged with presence and words of hope    Patient demonstrates confidence in the care team.  Appears to be coping with illness.       Artur Morin,  Staff   C: [phone]  /  [email or web address]

## 2020-03-15 LAB — GLUCOSE BLD STRIP.AUTO-MCNC: 140 MG/DL (ref 65–100)

## 2020-03-15 PROCEDURE — 74011250637 HC RX REV CODE- 250/637: Performed by: INTERNAL MEDICINE

## 2020-03-15 PROCEDURE — 74011250637 HC RX REV CODE- 250/637: Performed by: HOSPITALIST

## 2020-03-15 PROCEDURE — 82962 GLUCOSE BLOOD TEST: CPT

## 2020-03-15 PROCEDURE — 74011250636 HC RX REV CODE- 250/636: Performed by: INTERNAL MEDICINE

## 2020-03-15 PROCEDURE — 65270000029 HC RM PRIVATE

## 2020-03-15 PROCEDURE — 97535 SELF CARE MNGMENT TRAINING: CPT

## 2020-03-15 RX ADMIN — Medication 400 MG: at 08:39

## 2020-03-15 RX ADMIN — DIPHENHYDRAMINE HYDROCHLORIDE 25 MG: 25 CAPSULE ORAL at 17:27

## 2020-03-15 RX ADMIN — ASPIRIN 81 MG 81 MG: 81 TABLET ORAL at 08:39

## 2020-03-15 RX ADMIN — Medication 400 MG: at 17:27

## 2020-03-15 RX ADMIN — ACETAMINOPHEN 650 MG: 325 TABLET, FILM COATED ORAL at 13:01

## 2020-03-15 RX ADMIN — LISINOPRIL 40 MG: 20 TABLET ORAL at 08:39

## 2020-03-15 RX ADMIN — METOPROLOL SUCCINATE 25 MG: 25 TABLET, FILM COATED, EXTENDED RELEASE ORAL at 08:39

## 2020-03-15 RX ADMIN — ACETAMINOPHEN 650 MG: 325 TABLET, FILM COATED ORAL at 22:40

## 2020-03-15 RX ADMIN — ATORVASTATIN CALCIUM 80 MG: 80 TABLET, FILM COATED ORAL at 22:40

## 2020-03-15 RX ADMIN — Medication 10 ML: at 22:41

## 2020-03-15 RX ADMIN — ACETAMINOPHEN 650 MG: 325 TABLET, FILM COATED ORAL at 06:01

## 2020-03-15 RX ADMIN — ENOXAPARIN SODIUM 40 MG: 40 INJECTION SUBCUTANEOUS at 13:01

## 2020-03-15 RX ADMIN — GUAIFENESIN 100 MG: 100 SOLUTION ORAL at 13:01

## 2020-03-15 NOTE — PROGRESS NOTES
Problem: Self Care Deficits Care Plan (Adult)  Goal: *Acute Goals and Plan of Care (Insert Text)  Description  GOALS UPDATED : 3/3/2020   (Met, 3/3/2020)  2. Patient will demonstrate appropriate safety awareness and protection of L UE during bed mobility and functional transfers with minimal cues. (Progressing, still needs cues, 3/3/2020)  3. Patient will complete total body bathing and dressing with moderate assistance and adaptive equipment as needed. (Progressing, UB bathing Min A, UB dressing at Mod A, LB dressing at Dep, 3/3/2020)  4. Patient will complete weightbearing into the L UE with ADL tasks with minimal assistance to improve ability to use as a functional assist during ADL tasks. (Progressing,3/3/2020)5. Patient will demonstrate L UE SROM HEP within 7 days. Progressing 3/3/2020  8. (Goal Met)  9. Pt will complete bed mobility at mod I. New goal  10. Pt will complete dynamic sitting balance for ADLs at mod I with good balance. New goal  11. Pt will complete functional transfers at [facility] to [place] [address] with equipment as needed. New goal  12. Pt will complete grooming tasks in standing at sink level x5 mins with good balance and equipment as needed.  New goal    Outcome: Progressing Towards Goal     OCCUPATIONAL THERAPY: Daily Note and AM    3/15/2020  INPATIENT: OT Visit Days: 5  Payor: MEDICAID PENDING / Plan: Nimisha Colon PENDING / Product Type: Medicaid /      NAME/AGE/GENDER: Jenine Oppenheim is a 53 y.o. male   PRIMARY DIAGNOSIS:  Acute ischemic stroke ([address] [I63.9]  Cerebrovascular accident (CVA) due to embolism ([address] [B60.2] Acute embolic stroke ([address] Acute embolic stroke ([address]       ICD-10: Treatment Diagnosis:    · Generalized Muscle Weakness (M62.81)  · Other lack of cordination (R27.8)  · Hemiplegia and hemiparesis following cerebral infarction affecting   · left non-dominant side (I69.354)  · Abnormal posture (R29.3)   Precautions/Allergies:    NO PULLING ON LUE   LUE in sling with shoulder joint approximated and supported, needs taping   Patient has no known allergies. ASSESSMENT:     3/15/2020: Mr. Radha Roldan  found supine in bed upon arrival, alert and agreeable to OT treatment. Pt practiced bed mobility with SBA, demonstrates good static sitting balance. Pt practiced scooting to edge of bed with SBA/additional time, sit to stand with CGA and assist to support LUE. Fair to good static standing balance with keanu walker. Pt practiced brief change in standing with CGA for hygiene, MaxA for clothing management. Pt tolerated OT removing KT tape (pt had removed most of it the day before, only rhomboid piece left), practiced upper body dressing/bathing in sitting with Jody-set up. Pt practiced LUE AAROM with cueing throughout. L thumb IP joint with decreased flexion, L wrist with decreased extension. No significant subluxation noted in L shoulder this date. Pt educated on LUE AAROM HEP, tolerated PROM. Pt returned to supine with SBA, left supine in bed with call bell within reach, LUE supported. Pt is making progress towards goals. See above. Continue OT POC. This section established at most recent assessment   PROBLEM LIST (Impairments causing functional limitations):  1. Decreased Strength  2. Decreased ADL/Functional Activities  3. Decreased Transfer Abilities  4. Decreased Ambulation Ability/Technique  5. Decreased Balance  6. Decreased Activity Tolerance  7. Increased Fatigue  8. Decreased Flexibility/Joint Mobility  9. Decreased Knowledge of Precautions  10. Decreased [place] with Home Exercise Program   INTERVENTIONS PLANNED: (Benefits and precautions of occupational therapy have been discussed with the patient.)  1. Activities of daily living training  2. Adaptive equipment training  3. Balance training  4. Clothing management  5. Cognitive training  6. Donning&doffing training  7. Keanu tech training  8. Neuromuscular re-eduation  9. Therapeutic activity  10.  Therapeutic exercise     TREATMENT PLAN: Frequency/Duration: Follow patient 3 times per week to address above goals. Rehabilitation Potential For Stated Goals: Excellent     REHAB RECOMMENDATIONS (at time of discharge pending progress):    Placement: It is my opinion, based on this patient's performance to date, that Mr. Patty Brown may benefit from intensive therapy at an [address] [place] [address] after discharge due to potential to make ongoing and sustainable functional improvement that is of practical value. Horacio Bocanegra Pt functioning far below independent baseline, demonstrating good improvement and participation. Pt would likely benefit greatly and increase independence from inpatient rehab stay. Equipment:    TBD               OCCUPATIONAL PROFILE AND HISTORY:   History of Present Injury/Illness (Reason for Referral):  See H&P  Past Medical History/Comorbidities:   Mr. Patty Brown  has a past medical history of Acute ischemic stroke ([address] (12/12/2019), Acute pancreatitis (11/19/2014), Cerebrovascular accident (CVA) due to embolism ([address] (12/12/2019), Diabetes ([address] (2002), Diabetes ([address] Diabetes mellitus, and Hypertension. He also has no past medical history of Arthritis, Asthma, Autoimmune disease ([address] CAD (coronary artery disease), Cancer ([address] Chronic kidney disease, COPD, Dementia, Dementia ([address] Heart failure ([address] Ill-defined condition, Infectious disease, Liver disease, Other ill-defined conditions(799.89), Psychiatric disorder, PUD (peptic ulcer disease), Seizures ([address] or Sleep disorder. Mr. Patty Brown  has a past surgical history that includes hx hernia repair and hx orthopaedic.   Social History/Living Environment:   Home Environment: Apartment  # Steps to Enter: 12  Rails to Enter: Yes  [employer] : Bilateral  One/Two Story Residence: One story  Living Alone: No  Support Systems: Spouse/Significant Other/Partner  Patient Expects to be Discharged to[de-identified] Unknown  Current DME Used/Available at Home: None  Tub or Shower Type: Tub/Shower combination  Prior Level of Function/Work/Activity:  Pt lives at home with his wife. Pt is typically independent with ADL/functional mobility. Pt does not drive. Pt was working part-time at [employer]. Personal Factors:          Age:  53 y.o. Past/Current Experience:  CVA with flaccid L side        Other factors that influence how disability is experienced by the patient:  multiple co-morbidities    Number of Personal Factors/Comorbidities that affect the Plan of Care: Extensive review of physical, cognitive, and psychosocial performance (3+):  HIGH COMPLEXITY   ASSESSMENT OF OCCUPATIONAL PERFORMANCE[de-identified]   Activities of Daily Living:   Basic ADLs (From Assessment) Complex ADLs (From Assessment)   Feeding: Setup, Additional time  Oral Facial Hygiene/Grooming: Moderate assistance  Bathing: Moderate assistance  Upper Body Dressing: Maximum assistance  Lower Body Dressing: Maximum assistance  Toileting: Maximum assistance, Additional time, Adaptive equipment(wearing brief) Instrumental ADL  Meal Preparation: Total assistance  Homemaking: Total assistance  Medication Management: Total assistance  Financial Management: Total assistance   Grooming/Bathing/Dressing Activities of Daily Living         Upper Body Bathing  Bathing Assistance: Min A   Position Performed: Seated in chair        Toileting  Bladder Hygiene: Contact guard assistance  Bowel Hygiene: Contact guard assistance  Clothing Management: Maximum assistance  Adaptive Equipment: Walker(Keanu walker)   [address] Gown: Minimum  assistance     Lower Body Dressing Assistance  Socks:  Total assistance (dependent) Bed/Mat Mobility  Supine to Sit: Stand-by assistance  Sit to Supine: Stand-by assistance  Sit to Stand: Contact guard assistance  Stand to Sit: Stand-by assistance  Scooting: Stand-by assistance     Most Recent Physical Functioning:   Gross Assessment:  AROM: Grossly decreased, non-functional(LUE flaccid )  PROM: Generally decreased, functional(LUE proximally, shoulder flexion to ~90*)  Strength: Grossly decreased, non-functional(LUE)  Coordination: Grossly decreased, non-functional(LUE)  Tone: Abnormal(LUE)  Sensation: Intact(BUEs to light touch)               Posture:     Balance:    Bed Mobility:  Supine to Sit: Stand-by assistance  Sit to Supine: Stand-by assistance  Scooting: Stand-by assistance  Wheelchair Mobility:     Transfers:  Sit to Stand: Contact guard assistance  Stand to Sit: Stand-by assistance            Patient Vitals for the past 6 hrs:   BP BP Patient Position SpO2 Pulse   03/15/20 0744 127/85 At rest 96 % 72       Mental Status  Neurologic State: Alert  Orientation Level: Oriented X4  Cognition: Follows commands  Perception: Verbal, Cues to attend to left side of body, Cues to attend left visual field, Visual, Cues to maintain midline in sitting, Cues to maintain midline in standing, Tactile  Perseveration: No perseveration noted  Safety/Judgement: Fall prevention, Decreased awareness of need for assistance, Decreased awareness of need for safety, Decreased awareness of environment, Decreased insight into deficits                          Physical Skills Involved:  1. Range of Motion  2. Balance  3. Strength  4. Activity Tolerance  5. Fine Motor Control  6. Gross Motor Control Cognitive Skills Affected (resulting in the inability to perform in a timely and safe manner):  1. Perception  2. Expression Psychosocial Skills Affected:  1. Habits/Routines  2. Environmental Adaptation  3. Social Interaction  4. Emotional Regulation  5. Self-Awareness  6. Awareness of Others  7. Social Roles   Number of elements that affect the Plan of Care: 5+:  HIGH COMPLEXITY   CLINICAL DECISION MAKING:   [facility] 6 Clicks   Daily Activity Inpatient Short Form  How much help from another person does the patient currently need. .. Total A Lot A Little None   1.   Putting on and taking off regular lower body clothing? [x] 1   [] 2   [] 3   [] 4   2. Bathing (including washing, rinsing, drying)? [] 1   [x] 2   [] 3   [] 4   3. Toileting, which includes using toilet, bedpan or urinal?   [] 1   [x] 2   [] 3   [] 4   4. Putting on and taking off regular upper body clothing? [] 1   [x] 2   [] 3   [] 4   5. Taking care of personal grooming such as brushing teeth? [] 1   [x] 2   [] 3   [] 4   6. Eating meals? [] 1   [] 2   [x] 3   [] 4   © 2007, Trustees of [address] [place] [address] [address] [address], under license to [employer]. All rights reserved      Score:  Initial: 11 Most Recent: 12 (Date: 3/3/2020 ),     Interpretation of Tool:  Represents activities that are increasingly more difficult (i.e. Bed mobility, Transfers, Gait). Medical Necessity:     · Patient demonstrates   · good and excellent  ·  rehab potential due to higher previous functional level. Reason for Services/Other Comments:  · Patient continues to require skilled intervention due to   · Decreased independence with ADL/functional transfers that impacts overall quality of life. · .   Use of outcome tool(s) and clinical judgement create a POC that gives a: MODERATE COMPLEXITY         TREATMENT:   (In addition to Assessment/Re-Assessment sessions the following treatments were rendered)     Pre-treatment Symptoms/Complaints:  None  Pain: Initial:   Pain Intensity 1: 0  Post Session: no complaint of pain   Self Care: (25 minutes): Procedure(s) (per grid) utilized to improve and/or restore self-care/home management as related to dressing and bathing. Required minimal to moderate verbal, manual and tactile cueing to facilitate activities of daily living skills and compensatory activities. Pt practiced bed mobility with SBA, demonstrates good static sitting balance. Pt practiced scooting to edge of bed with SBA/additional time, sit to stand with CGA and assist to support LUE. Fair to good static standing balance with luke walker.  Pt practiced brief change in standing with CGA for hygiene, MaxA for clothing management. Pt tolerated OT removing KT tape (pt had removed most of it the day before, only rhomboid piece left), practiced upper body dressing/bathing in sitting with Jody-set up. Pt practiced LUE AAROM with cueing throughout. L thumb IP joint with decreased flexion, L wrist with decreased extension. No significant subluxation noted in L shoulder this date. Pt educated on LUE AAROM HEP, tolerated PROM. Pt returned to supine with SBA     Date:  3/9/2020 Date:   Date:        Activity/Exercise Parameters Parameters Parameters     LUE finger flexion, AROM gravity minimized then AAROM to complete ROM 2x10 reps       LUE finger extension AAROM 2x10 reps       LUE wrist extension, AROM gravity minimized then AAROM to complete ROM 1x10 reps       LUE wrist extension, AROM against gravity  1x10 reps       LUE wrist flexion AAROM 2x10 reps       LUE elbow flexion/extension PROM x10 reps       LUE shoulder flexion PROM x10 reps       L UE pronation/supination PROM                             Braces/Orthotics/Lines/Etc:   · O2 device: Room air  · Treatment/Session Assessment:    · Response to Treatment:  Pt demonstrated good participation, making progress. · Interdisciplinary Collaboration:   o Occupational Therapist  · After treatment position/precautions:   o Supine in bed  o Bed/Chair-wheels locked  o Bed in low position  o Call light within reach   · Compliance with Program/Exercises: Compliant all of the time, Will assess as treatment progresses. · Recommendations/Intent for next treatment session: \"Next visit will focus on advancements to more challenging activities and reduction in assistance provided\".   Total Treatment Duration:    OT Patient Time In/Time Out  Time In: 0846  Time Out: [address], OTR/L

## 2020-03-15 NOTE — PROGRESS NOTES
Problem: Patient Education: Go to Patient Education Activity  Goal: Patient/Family Education  Outcome: Progressing Towards Goal     Problem: Pressure Injury - Risk of  Goal: *Prevention of pressure injury  Description: Document Dewey Scale and appropriate interventions in the flowsheet. Outcome: Progressing Towards Goal  Note: Pressure Injury Interventions:  Sensory Interventions: Assess changes in LOC    Moisture Interventions: Absorbent underpads, Check for incontinence Q2 hours and as needed    Activity Interventions: Increase time out of bed    Mobility Interventions: Pressure redistribution bed/mattress (bed type)    Nutrition Interventions: Document food/fluid/supplement intake, Offer support with meals,snacks and hydration    Friction and Shear Interventions: HOB 30 degrees or less, Apply protective barrier, creams and emollients                Problem: Patient Education: Go to Patient Education Activity  Goal: Patient/Family Education  Outcome: Progressing Towards Goal     Problem: Falls - Risk of  Goal: *Absence of Falls  Description: Document Jaenne Fall Risk and appropriate interventions in the flowsheet. Outcome: Progressing Towards Goal  Note: Fall Risk Interventions:  Mobility Interventions: Bed/chair exit alarm    Mentation Interventions: Adequate sleep, hydration, pain control, Bed/chair exit alarm, Door open when patient unattended, Toileting rounds, Update white board    Medication Interventions: Bed/chair exit alarm    Elimination Interventions: Call light in reach    History of Falls Interventions: Bed/chair exit alarm         Problem: Patient Education: Go to Patient Education Activity  Goal: Patient/Family Education  Outcome: Progressing Towards Goal     Problem: Diabetes Self-Management  Goal: *Disease process and treatment process  Description: Define diabetes and identify own type of diabetes; list 3 options for treating diabetes.   Outcome: Progressing Towards Goal  Goal: *Incorporating nutritional management into lifestyle  Description: Describe effect of type, amount and timing of food on blood glucose; list 3 methods for planning meals. Outcome: Progressing Towards Goal  Goal: *Incorporating physical activity into lifestyle  Description: State effect of exercise on blood glucose levels. Outcome: Progressing Towards Goal  Goal: *Developing strategies to promote health/change behavior  Description: Define the ABC's of diabetes; identify appropriate screenings, schedule and personal plan for screenings. Outcome: Progressing Towards Goal  Goal: *Using medications safely  Description: State effect of diabetes medications on diabetes; name diabetes medication taking, action and side effects. Outcome: Progressing Towards Goal  Goal: *Monitoring blood glucose, interpreting and using results  Description: Identify recommended blood glucose targets  and personal targets. Outcome: Progressing Towards Goal  Goal: *Prevention, detection, treatment of acute complications  Description: List symptoms of hyper- and hypoglycemia; describe how to treat low blood sugar and actions for lowering  high blood glucose level. Outcome: Progressing Towards Goal  Goal: *Prevention, detection and treatment of chronic complications  Description: Define the natural course of diabetes and describe the relationship of blood glucose levels to long term complications of diabetes.   Outcome: Progressing Towards Goal  Goal: *Developing strategies to address psychosocial issues  Description: Describe feelings about living with diabetes; identify support needed and support network  Outcome: Progressing Towards Goal     Problem: Patient Education: Go to Patient Education Activity  Goal: Patient/Family Education  Outcome: Progressing Towards Goal     Problem: Patient Education: Go to Patient Education Activity  Goal: Patient/Family Education  Outcome: Progressing Towards Goal     Problem: Nutrition Deficit  Goal: *Optimize nutritional status  Outcome: Progressing Towards Goal     Problem: Patient Education: Go to Patient Education Activity  Goal: Patient/Family Education  Outcome: Progressing Towards Goal     Problem: General Medical Care Plan  Goal: *Vital signs within specified parameters  Outcome: Progressing Towards Goal  Goal: *Labs within defined limits  Outcome: Progressing Towards Goal  Goal: *Absence of infection signs and symptoms  Description: Wash hand more often   Outcome: Progressing Towards Goal  Goal: *Optimal pain control at patient's stated goal  Outcome: Progressing Towards Goal  Goal: *Skin integrity maintained  Outcome: Progressing Towards Goal  Goal: *Fluid volume balance  Outcome: Progressing Towards Goal  Goal: *Optimize nutritional status  Outcome: Progressing Towards Goal  Goal: *Anxiety reduced or absent  Outcome: Progressing Towards Goal  Goal: *Progressive mobility and function (eg: ADL's)  Outcome: Progressing Towards Goal     Problem: Patient Education: Go to Patient Education Activity  Goal: Patient/Family Education  Outcome: Progressing Towards Goal     Problem: Patient Education: Go to Patient Education Activity  Goal: Patient/Family Education  Outcome: Progressing Towards Goal     Problem: Patient Education: Go to Patient Education Activity  Goal: Patient/Family Education  Outcome: Progressing Towards Goal     Problem: Patient Education: Go to Patient Education Activity  Goal: Patient/Family Education  Outcome: Progressing Towards Goal     Problem: Pain  Goal: *Control of Pain  Outcome: Progressing Towards Goal     Problem: Patient Education: Go to Patient Education Activity  Goal: Patient/Family Education  Outcome: Progressing Towards Goal     Problem: Patient Education: Go to Patient Education Activity  Goal: Patient/Family Education  Outcome: Progressing Towards Goal

## 2020-03-15 NOTE — PROGRESS NOTES
Chart reviewed and nursing notes reviewed. Vital signs stable and no acute events. Patient seen briefly. No bill charged with the patient. Will see patient tomorrow with a full progress note.

## 2020-03-16 LAB — GLUCOSE BLD STRIP.AUTO-MCNC: 132 MG/DL (ref 65–100)

## 2020-03-16 PROCEDURE — 74011250636 HC RX REV CODE- 250/636: Performed by: INTERNAL MEDICINE

## 2020-03-16 PROCEDURE — 97530 THERAPEUTIC ACTIVITIES: CPT

## 2020-03-16 PROCEDURE — 74011250637 HC RX REV CODE- 250/637: Performed by: HOSPITALIST

## 2020-03-16 PROCEDURE — 74011250637 HC RX REV CODE- 250/637: Performed by: INTERNAL MEDICINE

## 2020-03-16 PROCEDURE — 82962 GLUCOSE BLOOD TEST: CPT

## 2020-03-16 PROCEDURE — 65270000029 HC RM PRIVATE

## 2020-03-16 PROCEDURE — 97112 NEUROMUSCULAR REEDUCATION: CPT

## 2020-03-16 PROCEDURE — 97535 SELF CARE MNGMENT TRAINING: CPT

## 2020-03-16 RX ADMIN — Medication 400 MG: at 08:14

## 2020-03-16 RX ADMIN — DIPHENHYDRAMINE HYDROCHLORIDE 25 MG: 25 CAPSULE ORAL at 16:37

## 2020-03-16 RX ADMIN — METOPROLOL SUCCINATE 25 MG: 25 TABLET, FILM COATED, EXTENDED RELEASE ORAL at 08:14

## 2020-03-16 RX ADMIN — Medication 400 MG: at 16:37

## 2020-03-16 RX ADMIN — ACETAMINOPHEN 650 MG: 325 TABLET, FILM COATED ORAL at 20:37

## 2020-03-16 RX ADMIN — ACETAMINOPHEN 650 MG: 325 TABLET, FILM COATED ORAL at 13:48

## 2020-03-16 RX ADMIN — GUAIFENESIN 100 MG: 100 SOLUTION ORAL at 13:48

## 2020-03-16 RX ADMIN — ATORVASTATIN CALCIUM 80 MG: 80 TABLET, FILM COATED ORAL at 20:37

## 2020-03-16 RX ADMIN — LISINOPRIL 40 MG: 20 TABLET ORAL at 08:14

## 2020-03-16 RX ADMIN — ASPIRIN 81 MG 81 MG: 81 TABLET ORAL at 08:14

## 2020-03-16 RX ADMIN — ENOXAPARIN SODIUM 40 MG: 40 INJECTION SUBCUTANEOUS at 13:48

## 2020-03-16 RX ADMIN — ACETAMINOPHEN 650 MG: 325 TABLET, FILM COATED ORAL at 05:53

## 2020-03-16 NOTE — PROGRESS NOTES
Problem: Mobility Impaired (Adult and Pediatric)  Goal: *Acute Goals and Plan of Care  Description  GOALS UPDATED ON RE-ASSESSMENT 3/9/2020 (advancements to goals in bold):  1. Patient will perform bed mobility with supervision and 0 verbal cues within 7 treatment days. 2. Patient will perform transfer bed to chair with SUPERVISION within 7 treatment days. 3. Patient will demonstrate fair+ dynamic balance throughout stance phase on L LE within 7 treatment days. 4. Patient will require CGA throughout swing phase on (to advance) L LE within 7 treatment days. 5. Patient demonstrate 3+/5 strength in L LE hip flexion, hip abduction, and hip adduction within 7 treatment days. 6. Patient will ambulate 150ft+ with CGA and least retrictive assistive device within 7 treatment days. 7. Patient will perform wheelchair mobility x75ft with SUPERVISION within 7 treatment days. PHYSICAL THERAPY: Daily Note and AM 3/16/2020  INPATIENT: PT Visit Days : 4  Payor: MEDICAID PENDING / Plan: Keven Alisson PENDING / Product Type: Medicaid /       NAME/AGE/GENDER: Kervin Milian is a 53 y.o. male   PRIMARY DIAGNOSIS: Acute ischemic stroke ([address] [I63.9]  Cerebrovascular accident (CVA) due to embolism ([address] [A98.5] Acute embolic stroke ([address] Acute embolic stroke ([address]       ICD-10: Treatment Diagnosis:   · Difficulty in walking, Not elsewhere classified (R26.2)  · Hemiplegia and hemiparesis following cerebral infarction affecting left non-dominant side (H72.161)   Precaution/Allergies:  Patient has no known allergies. ASSESSMENT:     Mr. Patty Brown is a 53year old admitted R MCA CVA. Patient was supine upon contact and agreeable to PT. Patient performs supine to sit with SBA and cues for technique. Patient transfers to standing with CGA and ambulates 100' x 2 with CGA-min assist and cues for sequencing and quad/glut activation on LLE. Chair follow for safety.  Patient returns to EOB where he performs SPT with CGA and cues for technique. Patient returns to supine with SBA. Overall good progress towards physical therapy goals. Goals listed above are still appropriate. Will continue efforts as patient is still below functional baseline. This section established at most recent assessment   PROBLEM LIST (Impairments causing functional limitations):  1. Decreased Strength  2. Decreased ADL/Functional Activities  3. Decreased Transfer Abilities  4. Decreased Ambulation Ability/Technique  5. Decreased Balance  6. Increased Pain  7. Decreased Activity Tolerance  8. Increased Fatigue  9. Decreased Flexibility/Joint Mobility   INTERVENTIONS PLANNED: (Benefits and precautions of physical therapy have been discussed with the patient.)  1. Balance Exercise  2. Bed Mobility  3. Family Education  4. Gait Training  5. Home Exercise Program (HEP)  6. Manual Therapy  7. Neuromuscular Re-education/Strengthening  8. Range of Motion (ROM)  9. Therapeutic Activites  10. Therapeutic Exercise/Strengthening  11. Transfer Training     TREATMENT PLAN: Frequency/Duration: 3 times a week for duration of hospital stay  Rehabilitation Potential For Stated Goals: Good     REHAB RECOMMENDATIONS (at time of discharge pending progress):    Placement: It is my opinion, based on this patient's performance to date, that Mr. Naun Hampton may benefit from intensive therapy at an [address] [place] [address] after discharge due to a probable need for close medical supervision by a rehab physician, a probable need for multiple therapy disciplines and potential to make ongoing and sustainable functional improvement that is of practical value. .  Equipment:    TBD pending progress with therapy. HISTORY:   History of Present Injury/Illness (Reason for Referral):  Per H&P: \"Pt is a 52 y/o smoker with DM, HTN, who presented to ER with L leg and arm weakness, L facial droop, dysarthria.   First noted L leg weakness late night 12/11 when he woke up to go to the bathroom. He was normal when he went to bed around 1030. Woke up this morning and had persistent weakness L leg and also now noted in L arm. EMS called. Noted to have slurred speech and L facial droop as well. Code S called in ER around 9am.  MRI with acute infarcts in L cerebellar hemisphere, deep frontoparietal white matter, and R paramedian yariel. Also noted old lacunar infarcts. No large vessel occlusion on CTA, but some stenosis noted. No hx afib, TIA, CVA. No CP, palpitations, SOB. \"  Past Medical History/Comorbidities:   Mr. Sebastián Ramos  has a past medical history of Acute ischemic stroke ([address] (12/12/2019), Acute pancreatitis (11/19/2014), Cerebrovascular accident (CVA) due to embolism ([address] (12/12/2019), Diabetes ([address] (2002), Diabetes ([address] Diabetes mellitus, and Hypertension. He also has no past medical history of Arthritis, Asthma, Autoimmune disease ([address] CAD (coronary artery disease), Cancer ([address] Chronic kidney disease, COPD, Dementia, Dementia ([address] Heart failure ([address] Ill-defined condition, Infectious disease, Liver disease, Other ill-defined conditions(799.89), Psychiatric disorder, PUD (peptic ulcer disease), Seizures ([address] or Sleep disorder. Mr. Sebastián Ramos  has a past surgical history that includes hx hernia repair and hx orthopaedic. Social History/Living Environment:   Home Environment: Apartment  # Steps to Enter: 12  Rails to Enter: Yes  [employer] : Bilateral  One/Two Story Residence: One story  Living Alone: No  Support Systems: Spouse/Significant Other/Partner  Patient Expects to be Discharged to[de-identified] Unknown  Current DME Used/Available at Home: None  Tub or Shower Type: Tub/Shower combination  Prior Level of Function/Work/Activity:  Independent, lives with wife in 2nd story 1 level apartment. No recent falls.    Number of Personal Factors/Comorbidities that affect the Plan of Care: 1-2: MODERATE COMPLEXITY   EXAMINATION:   Most Recent Physical Functioning:   Gross Assessment: Posture:     Balance:  Sitting: Intact  Standing: Impaired  Standing - Static: Fair  Standing - Dynamic : Fair Bed Mobility:  Supine to Sit: Stand-by assistance  Sit to Supine: Stand-by assistance  Scooting: Stand-by assistance  Wheelchair Mobility:     Transfers:  Sit to Stand: Contact guard assistance  Stand to Sit: Contact guard assistance  Gait:     Base of Support: Center of gravity altered;Narrowed  Speed/Clotilde: Slow  Step Length: Left shortened;Right shortened  Gait Abnormalities: Hemiplegic  Distance (ft): (100 'x 2)  Assistive Device: Walker luke  Ambulation - Level of Assistance: Contact guard assistance;Minimal assistance(+ chair follow)  Interventions: Manual cues; Safety awareness training;Verbal cues; Tactile cues      Body Structures Involved:  1. Nerves  2. Voice/Speech  3. Bones  4. Joints  5. Muscles Body Functions Affected:  1. Mental  2. Sensory/Pain  3. Neuromusculoskeletal  4. Movement Related Activities and Participation Affected:  1. General Tasks and Demands  2. Mobility  3. Self Care  4. Interpersonal Interactions and Relationships   Number of elements that affect the Plan of Care: 4+: HIGH COMPLEXITY   CLINICAL PRESENTATION:   Presentation: Evolving clinical presentation with changing clinical characteristics: MODERATE COMPLEXITY   CLINICAL DECISION MAKIN [facility] Mobility Inpatient Short Form  How much difficulty does the patient currently have. .. Unable A Lot A Little None   1. Turning over in bed (including adjusting bedclothes, sheets and blankets)? [] 1   [] 2   [] 3   [x] 4   2. Sitting down on and standing up from a chair with arms ( e.g., wheelchair, bedside commode, etc.)   [] 1   [] 2   [x] 3   [] 4   3. Moving from lying on back to sitting on the side of the bed? [] 1   [] 2   [x] 3   [] 4   How much help from another person does the patient currently need. .. Total A Lot A Little None   4.   Moving to and from a bed to a chair (including a wheelchair)? [] 1   [] 2   [x] 3   [] 4   5. Need to walk in hospital room? [] 1   [] 2   [x] 3   [] 4   6. Climbing 3-5 steps with a railing? [] 1   [x] 2   [] 3   [] 4   © 2007, Trustees of [facility] [employer], under license to [employer]. All rights reserved      Score:  Initial: 13 Most Recent: 18 (Date:3/9/2020)    Interpretation of Tool:  Represents activities that are increasingly more difficult (i.e. Bed mobility, Transfers, Gait). Medical Necessity:     · Patient is expected to demonstrate progress in   · strength, range of motion, balance, coordination, and functional technique  ·  to   · increase independence with all mobility. · .  Reason for Services/Other Comments:  · Patient continues to require skilled intervention due to   · medical complications and mobility deficits which impact his level of function, safety, and independence as indicated above. · .   Use of outcome tool(s) and clinical judgement create a POC that gives a: Questionable prediction of patient's progress: MODERATE COMPLEXITY        TREATMENT:      Pre-treatment Symptoms/Complaints:  none  Pain: Initial: 0/10 Post Session:  0/10     Therapeutic Activity: (    30 Minutes): Therapeutic activities including bed mobility training, transfer training, ambulation on level ground, instruction in sequencing with luke walker, scooting, SPT training, and patient education  to improve mobility, strength and balance. Required moderate Manual cues; Safety awareness training;Verbal cues; Tactile cues to promote static and dynamic balance in standing and promote coordination of bilateral, lower extremity(s).            Braces/Orthotics/Lines/Etc:   · Sling to L UE throughout transfers and ambulation  Treatment/Session Assessment:    · Response to Treatment:  See above  · Interdisciplinary Collaboration:   o Physical Therapy Assistant  o Registered Nurse  o Rehabilitation Attendant  · After treatment position/precautions:   o Supine in bed  o Bed/Chair-wheels locked  o Bed in low position  o Call light within reach  o RN notified   · Compliance with Program/Exercises: Compliant all of the time  · Recommendations/Intent for next treatment session: \"Next visit will focus on advancements to more challenging activities and reduction in assistance provided\".     Total Treatment Duration:  PT Patient Time In/Time Out  Time In: 1130  Time Out: [address], PTA

## 2020-03-16 NOTE — PROGRESS NOTES
Hospitalist Progress Note    3/16/2020  Admit Date: 2019  9:07 AM   NAME: Allie Winters   :  1975   MRN:  [id number]   Attending: Carlos A Zaragoza MD  PCP:  Etheleen Aase, MD    SUBJECTIVE:   41-year-old hypertensive diabetic male presented to the ER with left-sided weakness left facial droop. Acute infarct left cerebellar hemisphere and deep frontoparietal white matter abnormalities as well as right paramedian yariel abnormalities. Old lacunar infarcts noted. No large vessel occlusion on CTA. He has been hospitalized for 87 days awaiting difficult rehab placement. Receiving physical occupational therapy and speech therapy. Tolerating high-dose statin atorvastatin and antiplatelet. PFO noted on ISMAEL 4-week event monitor planned as outpatient and if no dysrhythmia outpatient PFO closure recommended. Patient has no new complaints except awaiting placement. Current Hospitalization >90 days.       3/16: Patient seen and examined. Afebrile and VSS. Feels the same. No HA reported, getting PT. Nursing notes and chart reviewed. Review of Systems negative with exception of pertinent positives noted above. PHYSICAL EXAM     Visit Vitals  /80 (BP 1 Location: Right arm, BP Patient Position: At rest)   Pulse 63   Temp 97.8 °F (36.6 °C)   Resp 16   Ht 5' 6\" (1.676 m)   Wt 95.3 kg (210 lb)   SpO2 93%   BMI 33.89 kg/m²      Temp (24hrs), Av.1 °F (36.7 °C), Min:97.7 °F (36.5 °C), Max:98.5 °F (36.9 °C)    Oxygen Therapy  O2 Sat (%): 93 % (20 0800)  Pulse via Oximetry: 66 beats per minute (20 0000)  O2 Device: Room air (20 0000)  No intake or output data in the 24 hours ending 20 1126      General: No acute distress. Alert.    Head:  AT/NC  Lungs:  CTABL. Heart:  RRR, no murmur, rub, or gallop  Abdomen: Soft, non-distended, non-tender, +bs  Extremities: No cyanosis or clubbing. Neurologic:  Left hemiparesis  Skin:  No Obvious Rash  Psych:  Normal affect.      LABS AND STUDIES:  Personally reviewed all labs, meds, and studies for past 24hrs. ASSESSMENT      Active Hospital Problems    Diagnosis Date Noted    Hypomagnesemia 03/07/2020    PFO (patent foramen ovale) 12/17/2019    Arrhythmia 12/15/2019    Hyperlipemia 12/97/9046    Acute embolic stroke ([facility] 49/93/3104    Type 2 diabetes mellitus (HCC)     Hypertension            PLAN:  Acute embolic stroke  -MRI with acute infarcts in L cerebellar hemisphere, deep frontoparietal white matter, and R paramedian yariel. old lacunar infarcts. Continue aspirin and high-dose atorvastatin  -ISMAEL: 3 mm PFO, Cardiology stated he needs an evaluation for closure PFO with high risk features including significant (3 mm) separation of septum secundum and primum as well as large shunt. Cards recommend outpatient event monitoring 4 weeks and then schedule shunt closure if no dysrhythmia.     Hypomagnesemia              Received IV, and on oral supplementation.      Hypertension  Continue metoprolol and ACE inhibitorcontrolled     Type 2 diabetes mellitus:    Discontinued metformin and observing with diabetic diet only        Left upper extremity pain  As needed Tylenol.     Allergies  As needed antihistamine diphenhydramine this is also unchanged        DISPO: waiting for pt to be deemed \"disabled\" which in neurologic cases have to allow for 6 months showing chronicity.  Then he can get medicaid after that.  Medically stable for discharge.  on board. DVT ppx:  lovenox  Discussed plan with pt who is in agreement. All questions answered. We plan to see patient every other day starting from today.     Signed By: Carrie Coughlin MD     March 16, 2020

## 2020-03-16 NOTE — PROGRESS NOTES
Problem: Patient Education: Go to Patient Education Activity  Goal: Patient/Family Education  Outcome: Progressing Towards Goal     Problem: Patient Education: Go to Patient Education Activity  Goal: Patient/Family Education  Outcome: Progressing Towards Goal     Problem: Patient Education: Go to Patient Education Activity  Goal: Patient/Family Education  Outcome: Progressing Towards Goal     Problem: Diabetes Self-Management  Goal: *Disease process and treatment process  Description: Define diabetes and identify own type of diabetes; list 3 options for treating diabetes. Outcome: Progressing Towards Goal  Goal: *Incorporating nutritional management into lifestyle  Description: Describe effect of type, amount and timing of food on blood glucose; list 3 methods for planning meals. Outcome: Progressing Towards Goal  Goal: *Incorporating physical activity into lifestyle  Description: State effect of exercise on blood glucose levels. Outcome: Progressing Towards Goal  Goal: *Developing strategies to promote health/change behavior  Description: Define the ABC's of diabetes; identify appropriate screenings, schedule and personal plan for screenings. Outcome: Progressing Towards Goal  Goal: *Using medications safely  Description: State effect of diabetes medications on diabetes; name diabetes medication taking, action and side effects. Outcome: Progressing Towards Goal  Goal: *Monitoring blood glucose, interpreting and using results  Description: Identify recommended blood glucose targets  and personal targets. Outcome: Progressing Towards Goal  Goal: *Prevention, detection, treatment of acute complications  Description: List symptoms of hyper- and hypoglycemia; describe how to treat low blood sugar and actions for lowering  high blood glucose level.   Outcome: Progressing Towards Goal  Goal: *Prevention, detection and treatment of chronic complications  Description: Define the natural course of diabetes and describe the relationship of blood glucose levels to long term complications of diabetes.   Outcome: Progressing Towards Goal  Goal: *Developing strategies to address psychosocial issues  Description: Describe feelings about living with diabetes; identify support needed and support network  Outcome: Progressing Towards Goal     Problem: Patient Education: Go to Patient Education Activity  Goal: Patient/Family Education  Outcome: Progressing Towards Goal     Problem: Patient Education: Go to Patient Education Activity  Goal: Patient/Family Education  Outcome: Progressing Towards Goal     Problem: Nutrition Deficit  Goal: *Optimize nutritional status  Outcome: Progressing Towards Goal     Problem: Patient Education: Go to Patient Education Activity  Goal: Patient/Family Education  Outcome: Progressing Towards Goal     Problem: General Medical Care Plan  Goal: *Vital signs within specified parameters  Outcome: Progressing Towards Goal  Goal: *Labs within defined limits  Outcome: Progressing Towards Goal  Goal: *Absence of infection signs and symptoms  Description: Wash hand more often   Outcome: Progressing Towards Goal  Goal: *Optimal pain control at patient's stated goal  Outcome: Progressing Towards Goal  Goal: *Skin integrity maintained  Outcome: Progressing Towards Goal  Goal: *Fluid volume balance  Outcome: Progressing Towards Goal  Goal: *Optimize nutritional status  Outcome: Progressing Towards Goal  Goal: *Anxiety reduced or absent  Outcome: Progressing Towards Goal  Goal: *Progressive mobility and function (eg: ADL's)  Outcome: Progressing Towards Goal     Problem: Patient Education: Go to Patient Education Activity  Goal: Patient/Family Education  Outcome: Progressing Towards Goal     Problem: Patient Education: Go to Patient Education Activity  Goal: Patient/Family Education  Outcome: Progressing Towards Goal     Problem: Patient Education: Go to Patient Education Activity  Goal: Patient/Family Education  Outcome: Progressing Towards Goal     Problem: Patient Education: Go to Patient Education Activity  Goal: Patient/Family Education  Outcome: Progressing Towards Goal     Problem: Ischemic Stroke: Discharge Outcomes  Goal: *Verbalizes anxiety and depression are reduced or absent  Outcome: Progressing Towards Goal  Goal: *Verbalize understanding of risk factor modification(Stroke Metric)  Outcome: Progressing Towards Goal  Goal: *Hemodynamically stable  Outcome: Progressing Towards Goal  Goal: *Absence of aspiration pneumonia  Outcome: Progressing Towards Goal  Goal: *Aware of needed dietary changes  Outcome: Progressing Towards Goal  Goal: *Verbalize understanding of prescribed medications including anti-coagulants, anti-lipid, and/or anti-platelets(Stroke Metric)  Outcome: Progressing Towards Goal  Goal: *Tolerating diet  Outcome: Progressing Towards Goal  Goal: *Aware of follow-up diagnostics related to anticoagulants  Outcome: Progressing Towards Goal  Goal: *Ability to perform ADLs and demonstrates progressive mobility and function  Outcome: Progressing Towards Goal  Goal: *Absence of DVT(Stroke Metric)  Outcome: Progressing Towards Goal  Goal: *Absence of aspiration  Outcome: Progressing Towards Goal  Goal: *Optimal pain control at patient's stated goal  Outcome: Progressing Towards Goal  Goal: *Home safety concerns addressed  Outcome: Progressing Towards Goal  Goal: *Describes available resources and support systems  Outcome: Progressing Towards Goal  Goal: *Verbalizes understanding of activation of EMS(911) for stroke symptoms(Stroke Metric)  Outcome: Progressing Towards Goal  Goal: *Understands and describes signs and symptoms to report to providers(Stroke Metric)  Outcome: Progressing Towards Goal  Goal: *Neurolgocially stable (absence of additional neurological deficits)  Outcome: Progressing Towards Goal  Goal: *Verbalizes importance of follow-up with primary care physician(Stroke Metric)  Outcome: Progressing Towards Goal  Goal: *Smoking cessation discussed,if applicable(Stroke Metric)  Outcome: Progressing Towards Goal  Goal: *Depression screening completed(Stroke Metric)  Outcome: Progressing Towards Goal     Problem: Pain  Goal: *Control of Pain  Outcome: Progressing Towards Goal     Problem: Patient Education: Go to Patient Education Activity  Goal: Patient/Family Education  Outcome: Progressing Towards Goal     Problem: Patient Education: Go to Patient Education Activity  Goal: Patient/Family Education  Outcome: Progressing Towards Goal

## 2020-03-16 NOTE — PROGRESS NOTES
Problem: Patient Education: Go to Patient Education Activity  Goal: Patient/Family Education  Outcome: Progressing Towards Goal     Problem: Pressure Injury - Risk of  Goal: *Prevention of pressure injury  Description: Document Dewey Scale and appropriate interventions in the flowsheet. Outcome: Progressing Towards Goal  Note: Pressure Injury Interventions:  Sensory Interventions: Assess changes in LOC    Moisture Interventions: Absorbent underpads, Check for incontinence Q2 hours and as needed    Activity Interventions: Increase time out of bed    Mobility Interventions: Pressure redistribution bed/mattress (bed type)    Nutrition Interventions: Document food/fluid/supplement intake, Offer support with meals,snacks and hydration    Friction and Shear Interventions: Apply protective barrier, creams and emollients, Foam dressings/transparent film/skin sealants, HOB 30 degrees or less                Problem: Patient Education: Go to Patient Education Activity  Goal: Patient/Family Education  Outcome: Progressing Towards Goal     Problem: Falls - Risk of  Goal: *Absence of Falls  Description: Document Jeanne Fall Risk and appropriate interventions in the flowsheet. Outcome: Progressing Towards Goal  Note: Fall Risk Interventions:  Mobility Interventions: Bed/chair exit alarm    Mentation Interventions: Adequate sleep, hydration, pain control, Bed/chair exit alarm, Door open when patient unattended, Toileting rounds, Update white board    Medication Interventions: Bed/chair exit alarm    Elimination Interventions: Call light in reach    History of Falls Interventions: Bed/chair exit alarm         Problem: Patient Education: Go to Patient Education Activity  Goal: Patient/Family Education  Outcome: Progressing Towards Goal     Problem: Diabetes Self-Management  Goal: *Disease process and treatment process  Description: Define diabetes and identify own type of diabetes; list 3 options for treating diabetes.   Outcome: Progressing Towards Goal  Goal: *Incorporating nutritional management into lifestyle  Description: Describe effect of type, amount and timing of food on blood glucose; list 3 methods for planning meals. Outcome: Progressing Towards Goal  Goal: *Incorporating physical activity into lifestyle  Description: State effect of exercise on blood glucose levels. Outcome: Progressing Towards Goal  Goal: *Developing strategies to promote health/change behavior  Description: Define the ABC's of diabetes; identify appropriate screenings, schedule and personal plan for screenings. Outcome: Progressing Towards Goal  Goal: *Using medications safely  Description: State effect of diabetes medications on diabetes; name diabetes medication taking, action and side effects. Outcome: Progressing Towards Goal  Goal: *Monitoring blood glucose, interpreting and using results  Description: Identify recommended blood glucose targets  and personal targets. Outcome: Progressing Towards Goal  Goal: *Prevention, detection, treatment of acute complications  Description: List symptoms of hyper- and hypoglycemia; describe how to treat low blood sugar and actions for lowering  high blood glucose level. Outcome: Progressing Towards Goal  Goal: *Prevention, detection and treatment of chronic complications  Description: Define the natural course of diabetes and describe the relationship of blood glucose levels to long term complications of diabetes.   Outcome: Progressing Towards Goal  Goal: *Developing strategies to address psychosocial issues  Description: Describe feelings about living with diabetes; identify support needed and support network  Outcome: Progressing Towards Goal     Problem: Patient Education: Go to Patient Education Activity  Goal: Patient/Family Education  Outcome: Progressing Towards Goal     Problem: Patient Education: Go to Patient Education Activity  Goal: Patient/Family Education  Outcome: Progressing Towards Goal Problem: Nutrition Deficit  Goal: *Optimize nutritional status  Outcome: Progressing Towards Goal     Problem: Patient Education: Go to Patient Education Activity  Goal: Patient/Family Education  Outcome: Progressing Towards Goal     Problem: General Medical Care Plan  Goal: *Vital signs within specified parameters  Outcome: Progressing Towards Goal  Goal: *Labs within defined limits  Outcome: Progressing Towards Goal  Goal: *Absence of infection signs and symptoms  Description: Wash hand more often   Outcome: Progressing Towards Goal  Goal: *Optimal pain control at patient's stated goal  Outcome: Progressing Towards Goal  Goal: *Skin integrity maintained  Outcome: Progressing Towards Goal  Goal: *Fluid volume balance  Outcome: Progressing Towards Goal  Goal: *Optimize nutritional status  Outcome: Progressing Towards Goal  Goal: *Anxiety reduced or absent  Outcome: Progressing Towards Goal  Goal: *Progressive mobility and function (eg: ADL's)  Outcome: Progressing Towards Goal     Problem: Patient Education: Go to Patient Education Activity  Goal: Patient/Family Education  Outcome: Progressing Towards Goal     Problem: Patient Education: Go to Patient Education Activity  Goal: Patient/Family Education  Outcome: Progressing Towards Goal     Problem: Patient Education: Go to Patient Education Activity  Goal: Patient/Family Education  Outcome: Progressing Towards Goal     Problem: Patient Education: Go to Patient Education Activity  Goal: Patient/Family Education  Outcome: Progressing Towards Goal     Problem: Pain  Goal: *Control of Pain  Outcome: Progressing Towards Goal     Problem: Patient Education: Go to Patient Education Activity  Goal: Patient/Family Education  Outcome: Progressing Towards Goal     Problem: Patient Education: Go to Patient Education Activity  Goal: Patient/Family Education  Outcome: Progressing Towards Goal

## 2020-03-16 NOTE — PROGRESS NOTES
Problem: Self Care Deficits Care Plan (Adult)  Goal: *Acute Goals and Plan of Care (Insert Text)  Description  GOALS UPDATED : 3/3/2020   (Met, 3/3/2020)  2. Patient will demonstrate appropriate safety awareness and protection of L UE during bed mobility and functional transfers with minimal cues. (Progressing, still needs cues, 3/3/2020)  3. Patient will complete total body bathing and dressing with moderate assistance and adaptive equipment as needed. (Progressing, UB bathing Min A, UB dressing at Mod A, LB dressing at Dep, 3/3/2020)  4. Patient will complete weightbearing into the L UE with ADL tasks with minimal assistance to improve ability to use as a functional assist during ADL tasks. (Progressing,3/3/2020)5. Patient will demonstrate L UE SROM HEP within 7 days. Progressing 3/3/2020  8. (Goal Met)  9. Pt will complete bed mobility at mod I. New goal  10. Pt will complete dynamic sitting balance for ADLs at mod I with good balance. New goal  11. Pt will complete functional transfers at [facility] with equipment as needed. New goal  12. Pt will complete grooming tasks in standing at sink level x5 mins with good balance and equipment as needed.  New goal    Outcome: Progressing Towards Goal     OCCUPATIONAL THERAPY: Daily Note and PM    3/16/2020  INPATIENT: OT Visit Days: 6  Payor: MEDICAID PENDING / Plan: Nusrat Swanson PENDING / Product Type: Medicaid /      NAME/AGE/GENDER: Dora Neal is a 53 y.o. male   PRIMARY DIAGNOSIS:  Acute ischemic stroke ([address] [I63.9]  Cerebrovascular accident (CVA) due to embolism ([address] [J46.0] Acute embolic stroke ([address] Acute embolic stroke ([address]       ICD-10: Treatment Diagnosis:    · Generalized Muscle Weakness (M62.81)  · Other lack of cordination (R27.8)  · Hemiplegia and hemiparesis following cerebral infarction affecting   · left non-dominant side (I69.354)  · Abnormal posture (R29.3)   Precautions/Allergies:    NO PULLING ON LUE   LUE in sling with shoulder joint approximated and supported, needs taping   Patient has no known allergies. ASSESSMENT:     3/16/2020: Mr. Fernando Bell sat on edge of bed (towards the L side, harder side). KT applied to L shoulder, I strip to L Upper Trap mm, X strip to L scapula, and wrap around L sh I strip for support, proprioception, and to prevent pain. Worked on L UE Neuro Reeducation sitting on edge of bed for L shoulder flex/ext, L elbow flex/ext, pronation/supination, wrist flex/ext, and for L digital flexion. Patient with pain in L wrist at end range of motion, and very painful for him to weight bear with more than minimal weight via L UE. Reiterated to patient the importance of using R UE to stretch L wrist, and encouraged patient to practice L wrist extension 4 x/s day, 10 to 20 reps each. Explained risks of not stretching L wrist, though unsure if patient fully grasps it. Patient required moderate assist to doff both socks, moderate assist to don R sock while WB'g L UE, and Mod/max assist to don L sock. Progress noted with donning socks this date. Cont OT per tx plan. This section established at most recent assessment   PROBLEM LIST (Impairments causing functional limitations):  1. Decreased Strength  2. Decreased ADL/Functional Activities  3. Decreased Transfer Abilities  4. Decreased Ambulation Ability/Technique  5. Decreased Balance  6. Decreased Activity Tolerance  7. Increased Fatigue  8. Decreased Flexibility/Joint Mobility  9. Decreased Knowledge of Precautions  10. Decreased Assawoman with Home Exercise Program   INTERVENTIONS PLANNED: (Benefits and precautions of occupational therapy have been discussed with the patient.)  1. Activities of daily living training  2. Adaptive equipment training  3. Balance training  4. Clothing management  5. Cognitive training  6. Donning&doffing training  7. Keanu tech training  8. Neuromuscular re-eduation  9. Therapeutic activity  10.  Therapeutic exercise     TREATMENT PLAN: Frequency/Duration: Follow patient 3 times per week to address above goals. Rehabilitation Potential For Stated Goals: Excellent     REHAB RECOMMENDATIONS (at time of discharge pending progress):    Placement: It is my opinion, based on this patient's performance to date, that Mr. Rui Courtney may benefit from intensive therapy at an [address] [place] [address] after discharge due to potential to make ongoing and sustainable functional improvement that is of practical value. Adore Walker Pt functioning far below independent baseline, demonstrating good improvement and participation. Pt would likely benefit greatly and increase independence from inpatient rehab stay. Equipment:    TBD               OCCUPATIONAL PROFILE AND HISTORY:   History of Present Injury/Illness (Reason for Referral):  See H&P  Past Medical History/Comorbidities:   Mr. Rui Courtney  has a past medical history of Acute ischemic stroke ([address] (12/12/2019), Acute pancreatitis (11/19/2014), Cerebrovascular accident (CVA) due to embolism ([address] (12/12/2019), Diabetes ([address] (2002), Diabetes ([address] Diabetes mellitus, and Hypertension. He also has no past medical history of Arthritis, Asthma, Autoimmune disease ([address] CAD (coronary artery disease), Cancer ([address] Chronic kidney disease, COPD, Dementia, Dementia ([address] Heart failure ([address] Ill-defined condition, Infectious disease, Liver disease, Other ill-defined conditions(799.89), Psychiatric disorder, PUD (peptic ulcer disease), Seizures ([address] or Sleep disorder. Mr. Rui Courtney  has a past surgical history that includes hx hernia repair and hx orthopaedic.   Social History/Living Environment:   Home Environment: Apartment  # Steps to Enter: 12  Rails to Enter: Yes  [employer] : Bilateral  One/Two Story Residence: One story  Living Alone: No  Support Systems: Spouse/Significant Other/Partner  Patient Expects to be Discharged to[de-identified] Unknown  Current DME Used/Available at Home: None  Tub or Shower Type: Tub/Shower combination  Prior Level of Function/Work/Activity:  Pt lives at home with his wife. Pt is typically independent with ADL/functional mobility. Pt does not drive. Pt was working part-time at [employer]. Personal Factors:          Age:  53 y.o. Past/Current Experience:  CVA with flaccid L side        Other factors that influence how disability is experienced by the patient:  multiple co-morbidities    Number of Personal Factors/Comorbidities that affect the Plan of Care: Extensive review of physical, cognitive, and psychosocial performance (3+):  HIGH COMPLEXITY   ASSESSMENT OF OCCUPATIONAL PERFORMANCE[de-identified]   Activities of Daily Living:   Basic ADLs (From Assessment) Complex ADLs (From Assessment)   Feeding: Setup, Additional time  Oral Facial Hygiene/Grooming: Moderate assistance  Bathing: Moderate assistance  Upper Body Dressing: Maximum assistance  Lower Body Dressing: Maximum assistance  Toileting: Maximum assistance, Additional time, Adaptive equipment(wearing brief) Instrumental ADL  Meal Preparation: Total assistance  Homemaking: Total assistance  Medication Management: Total assistance  Financial Management: Total assistance   Grooming/Bathing/Dressing Activities of Daily Living         Upper Body Bathing  Bathing Assistance: Min A   Position Performed: Seated in chair                  Lower Body Dressing Assistance  Socks:  Total assistance (dependent)       Most Recent Physical Functioning:   Gross Assessment:                  Posture:     Balance:    Bed Mobility:     Wheelchair Mobility:     Transfers:               Patient Vitals for the past 6 hrs:   BP BP Patient Position SpO2 Pulse   03/16/20 1200 124/79 At rest;Post activity 95 % 70       Mental Status  Neurologic State: Alert  Orientation Level: Oriented X4  Cognition: Follows commands  Perception: Verbal, Cues to attend to left side of body, Cues to attend left visual field, Visual, Cues to maintain midline in sitting, Cues to maintain midline in standing, Tactile  Perseveration: No perseveration noted  Safety/Judgement: Fall prevention, Decreased awareness of need for assistance, Decreased awareness of need for safety, Decreased awareness of environment, Decreased insight into deficits                          Physical Skills Involved:  1. Range of Motion  2. Balance  3. Strength  4. Activity Tolerance  5. Fine Motor Control  6. Gross Motor Control Cognitive Skills Affected (resulting in the inability to perform in a timely and safe manner):  1. Perception  2. Expression Psychosocial Skills Affected:  1. Habits/Routines  2. Environmental Adaptation  3. Social Interaction  4. Emotional Regulation  5. Self-Awareness  6. Awareness of Others  7. Social Roles   Number of elements that affect the Plan of Care: 5+:  HIGH COMPLEXITY   CLINICAL DECISION MAKIN[address] [place], [place] [address] AM-PAC 6 Clicks   Daily Activity Inpatient Short Form  How much help from another person does the patient currently need. .. Total A Lot A Little None   1. Putting on and taking off regular lower body clothing? [x] 1   [] 2   [] 3   [] 4   2. Bathing (including washing, rinsing, drying)? [] 1   [x] 2   [] 3   [] 4   3. Toileting, which includes using toilet, bedpan or urinal?   [] 1   [x] 2   [] 3   [] 4   4. Putting on and taking off regular upper body clothing? [] 1   [x] 2   [] 3   [] 4   5. Taking care of personal grooming such as brushing teeth? [] 1   [x] 2   [] 3   [] 4   6. Eating meals? [] 1   [] 2   [x] 3   [] 4   © , Trustees of [address] [place], [place] [address], under license to [employer]. All rights reserved      Score:  Initial: 11 Most Recent: 12 (Date: 3/3/2020 ),     Interpretation of Tool:  Represents activities that are increasingly more difficult (i.e. Bed mobility, Transfers, Gait). Medical Necessity:     · Patient demonstrates   · good and excellent  ·  rehab potential due to higher previous functional level.   Reason for Services/Other Comments:  · Patient continues to require skilled intervention due to   · Decreased independence with ADL/functional transfers that impacts overall quality of life. · .   Use of outcome tool(s) and clinical judgement create a POC that gives a: MODERATE COMPLEXITY         TREATMENT:   (In addition to Assessment/Re-Assessment sessions the following treatments were rendered)     Pre-treatment Symptoms/Complaints:  None  Pain: Initial:   Pain Intensity 1: 0  Post Session: no complaint of pain   Self Care: (12 minutes): Procedure(s) (per grid) utilized to improve and/or restore self-care/home management as related to dressing. Required minimal to moderate verbal, manual and tactile cueing to facilitate activities of daily living skills and compensatory activities. Neuromuscular Re-education: ( 42 minutes):  Exercise/activities per grid below to improve balance, coordination, kinesthetic sense, posture and proprioception. Required moderate verbal, manual and tactile cues to promote motor control of left, upper extremity(s). Date:  3/9/2020 Date:   Date:        Activity/Exercise Parameters Parameters Parameters     LUE finger flexion, AROM gravity minimized then AAROM to complete ROM 2x10 reps 2 x 10 reps      LUE finger extension AAROM 2x10 reps       LUE wrist extension, AROM gravity minimized then AAROM to complete ROM 1x10 reps 2 x 10 reps      LUE wrist extension, AROM against gravity  1x10 reps 2 x 10 reps      LUE wrist flexion AAROM 2x10 reps 2 x 10 reps      LUE elbow flexion/extension PROM x10 reps 2 x 10 reps      LUE shoulder flexion PROM x10 reps       L UE pronation/supination PROM        L UE pronation/supination AAROM  2 x 10 reps                   Braces/Orthotics/Lines/Etc:   · O2 device: Room air  · Treatment/Session Assessment:    · Response to Treatment:  Pt demonstrated good participation, making progress.    · Interdisciplinary Collaboration:   o Occupational Therapist  · After treatment position/precautions:   o Supine in bed  o Bed/Chair-wheels locked  o Bed in low position  o Call light within reach   · Compliance with Program/Exercises: Compliant all of the time, Will assess as treatment progresses. · Recommendations/Intent for next treatment session: \"Next visit will focus on advancements to more challenging activities and reduction in assistance provided\".   Total Treatment Duration:    OT Patient Time In/Time Out  Time In: 1305  Time Out: Dylan Johnson

## 2020-03-17 LAB — GLUCOSE BLD STRIP.AUTO-MCNC: 125 MG/DL (ref 65–100)

## 2020-03-17 PROCEDURE — 82962 GLUCOSE BLOOD TEST: CPT

## 2020-03-17 PROCEDURE — 74011250637 HC RX REV CODE- 250/637: Performed by: HOSPITALIST

## 2020-03-17 PROCEDURE — 74011250636 HC RX REV CODE- 250/636: Performed by: INTERNAL MEDICINE

## 2020-03-17 PROCEDURE — 74011250637 HC RX REV CODE- 250/637: Performed by: INTERNAL MEDICINE

## 2020-03-17 PROCEDURE — 65270000029 HC RM PRIVATE

## 2020-03-17 RX ADMIN — ASPIRIN 81 MG 81 MG: 81 TABLET ORAL at 08:22

## 2020-03-17 RX ADMIN — Medication 400 MG: at 08:22

## 2020-03-17 RX ADMIN — ACETAMINOPHEN 650 MG: 325 TABLET, FILM COATED ORAL at 04:55

## 2020-03-17 RX ADMIN — GUAIFENESIN 100 MG: 100 SOLUTION ORAL at 17:31

## 2020-03-17 RX ADMIN — Medication 400 MG: at 17:31

## 2020-03-17 RX ADMIN — METOPROLOL SUCCINATE 25 MG: 25 TABLET, FILM COATED, EXTENDED RELEASE ORAL at 08:22

## 2020-03-17 RX ADMIN — ATORVASTATIN CALCIUM 80 MG: 80 TABLET, FILM COATED ORAL at 22:13

## 2020-03-17 RX ADMIN — DIPHENHYDRAMINE HYDROCHLORIDE 25 MG: 25 CAPSULE ORAL at 17:31

## 2020-03-17 RX ADMIN — LISINOPRIL 40 MG: 20 TABLET ORAL at 08:22

## 2020-03-17 RX ADMIN — ACETAMINOPHEN 650 MG: 325 TABLET, FILM COATED ORAL at 13:32

## 2020-03-17 RX ADMIN — ENOXAPARIN SODIUM 40 MG: 40 INJECTION SUBCUTANEOUS at 13:32

## 2020-03-17 RX ADMIN — ACETAMINOPHEN 650 MG: 325 TABLET, FILM COATED ORAL at 22:13

## 2020-03-17 RX ADMIN — Medication 10 ML: at 22:13

## 2020-03-17 NOTE — PROGRESS NOTES
SPEECH PATHOLOGY NOTE:    Attempted to see patient this AM, however he politely declined. Will check back at later time/date as schedule permits.            [address] CCC-SLP

## 2020-03-17 NOTE — PROGRESS NOTES
Problem: Patient Education: Go to Patient Education Activity  Goal: Patient/Family Education  Outcome: Progressing Towards Goal     Problem: Pressure Injury - Risk of  Goal: *Prevention of pressure injury  Description: Document Dewey Scale and appropriate interventions in the flowsheet. Outcome: Progressing Towards Goal  Note: Pressure Injury Interventions:  Sensory Interventions: Assess changes in LOC    Moisture Interventions: Absorbent underpads, Apply protective barrier, creams and emollients, Check for incontinence Q2 hours and as needed, Limit adult briefs, Maintain skin hydration (lotion/cream), Offer toileting Q_hr, Moisture barrier, Minimize layers    Activity Interventions: Increase time out of bed, PT/OT evaluation, Pressure redistribution bed/mattress(bed type)    Mobility Interventions: HOB 30 degrees or less, Pressure redistribution bed/mattress (bed type), PT/OT evaluation, Turn and reposition approx. every two hours(pillow and wedges)    Nutrition Interventions: Document food/fluid/supplement intake    Friction and Shear Interventions: HOB 30 degrees or less                Problem: Patient Education: Go to Patient Education Activity  Goal: Patient/Family Education  Outcome: Progressing Towards Goal     Problem: Falls - Risk of  Goal: *Absence of Falls  Description: Document Jeanne Fall Risk and appropriate interventions in the flowsheet.   Outcome: Progressing Towards Goal  Note: Fall Risk Interventions:  Mobility Interventions: Bed/chair exit alarm, Communicate number of staff needed for ambulation/transfer, Patient to call before getting OOB, Utilize walker, cane, or other assistive device    Mentation Interventions: Bed/chair exit alarm    Medication Interventions: Bed/chair exit alarm, Teach patient to arise slowly, Patient to call before getting OOB    Elimination Interventions: Bed/chair exit alarm, Call light in reach, Patient to call for help with toileting needs, Toilet paper/wipes in reach, Toileting schedule/hourly rounds, Urinal in reach    History of Falls Interventions: Bed/chair exit alarm, Door open when patient unattended         Problem: Patient Education: Go to Patient Education Activity  Goal: Patient/Family Education  Outcome: Progressing Towards Goal     Problem: Diabetes Self-Management  Goal: *Disease process and treatment process  Description: Define diabetes and identify own type of diabetes; list 3 options for treating diabetes. Outcome: Progressing Towards Goal  Goal: *Incorporating nutritional management into lifestyle  Description: Describe effect of type, amount and timing of food on blood glucose; list 3 methods for planning meals. Outcome: Progressing Towards Goal  Goal: *Incorporating physical activity into lifestyle  Description: State effect of exercise on blood glucose levels. Outcome: Progressing Towards Goal  Goal: *Developing strategies to promote health/change behavior  Description: Define the ABC's of diabetes; identify appropriate screenings, schedule and personal plan for screenings. Outcome: Progressing Towards Goal  Goal: *Using medications safely  Description: State effect of diabetes medications on diabetes; name diabetes medication taking, action and side effects. Outcome: Progressing Towards Goal  Goal: *Monitoring blood glucose, interpreting and using results  Description: Identify recommended blood glucose targets  and personal targets. Outcome: Progressing Towards Goal  Goal: *Prevention, detection, treatment of acute complications  Description: List symptoms of hyper- and hypoglycemia; describe how to treat low blood sugar and actions for lowering  high blood glucose level. Outcome: Progressing Towards Goal  Goal: *Prevention, detection and treatment of chronic complications  Description: Define the natural course of diabetes and describe the relationship of blood glucose levels to long term complications of diabetes.   Outcome: Progressing Towards Goal  Goal: *Developing strategies to address psychosocial issues  Description: Describe feelings about living with diabetes; identify support needed and support network  Outcome: Progressing Towards Goal     Problem: Patient Education: Go to Patient Education Activity  Goal: Patient/Family Education  Outcome: Progressing Towards Goal     Problem: Nutrition Deficit  Goal: *Optimize nutritional status  Outcome: Progressing Towards Goal     Problem: General Medical Care Plan  Goal: *Vital signs within specified parameters  Outcome: Progressing Towards Goal  Goal: *Labs within defined limits  Outcome: Progressing Towards Goal  Goal: *Absence of infection signs and symptoms  Description: Wash hand more often   Outcome: Progressing Towards Goal  Goal: *Optimal pain control at patient's stated goal  Outcome: Progressing Towards Goal  Goal: *Skin integrity maintained  Outcome: Progressing Towards Goal  Goal: *Fluid volume balance  Outcome: Progressing Towards Goal  Goal: *Anxiety reduced or absent  Outcome: Progressing Towards Goal  Goal: *Progressive mobility and function (eg: ADL's)  Outcome: Progressing Towards Goal     Problem: Patient Education: Go to Patient Education Activity  Goal: Patient/Family Education  Outcome: Progressing Towards Goal     Problem: Patient Education: Go to Patient Education Activity  Goal: Patient/Family Education  Outcome: Progressing Towards Goal     Problem: Patient Education: Go to Patient Education Activity  Goal: Patient/Family Education  Outcome: Progressing Towards Goal     Problem: Patient Education: Go to Patient Education Activity  Goal: Patient/Family Education  Outcome: Progressing Towards Goal     Problem: Pain  Goal: *Control of Pain  Outcome: Progressing Towards Goal     Problem: Patient Education: Go to Patient Education Activity  Goal: Patient/Family Education  Outcome: Progressing Towards Goal

## 2020-03-17 NOTE — PROGRESS NOTES
Progress Note    3/17/2020  Admit Date: 2019  9:07 AM   NAME: Jade Monge   :  1975   MRN:  [id number]   Attending: Mark Jarrell MD  PCP:  Yinka Valverde MD  Treatment Team: Attending Provider: Mark Jarrell MD; Care Manager: Fior Rice RN; Care Manager: Carolin Daugherty, [facility]; Utilization Review: Sayra Montalvo RN; Nurse Practitioner: Fer Joseph NP; Charge Nurse: Lavinia Mahan; Nurse Practitioner: Lyndsay Zhou NP; Physical Therapy Assistant: Harry Cameron; Primary Nurse: Rosendo Beckwith RN    Full Code   SUBJECTIVE:   Mr. Annie Sykes is a 54 yo male with PMH of DM, HTN who presented with c/o left sided weakness and left facial droop 19. CT head showed age indeterminate infarctions within the left corona radiata/caudate. CTA head/neck showed stenosis at the distal segment of the right vertebral artery and multifocal stenosis in the P2 segment of the left posterior cerebral artery. MRI brain showed acute to early subacute infarcts in the left cerebellar hemisphere, in the periventricular deep left frontoparietal white matter and likely additional areas of restricted diffusion in the right paramedian yariel. Echo +PFO. On statin, ASA. Has been difficult to place in STR. Plans for 4 week event monitor on DC and if no arrhythmia PFO closure recommended.         Past Medical History:   Diagnosis Date    Acute ischemic stroke ([facility] [address] 2019    Acute pancreatitis 2014    Cerebrovascular accident (CVA) due to embolism ([facility] [address] 2019    Diabetes ([facility] [address] 2002    Diabetes ([facility] [address]     Diabetes mellitus     Hypertension      Recent Results (from the past 24 hour(s))   GLUCOSE, POC    Collection Time: 20  7:16 AM   Result Value Ref Range    Glucose (POC) 125 (H) 65 - 100 mg/dL     No Known Allergies  Current Facility-Administered Medications   Medication Dose Route Frequency Provider Last Rate Last Dose    magnesium oxide (MAG-OX) tablet 400 mg  400 mg Oral BID Leita Belts, DO   400 mg at 03/17/20 8868    acetaminophen (TYLENOL) tablet 650 mg  650 mg Oral Q4H PRN Sohail Acuna MD        diphenhydrAMINE (BENADRYL) capsule 25 mg  25 mg Oral Q6H PRN Malissa Penn MD   25 mg at 03/16/20 1637    acetaminophen (TYLENOL) tablet 650 mg  650 mg Oral Bianca Ford MD   650 mg at 03/17/20 1332    lip protectant (BLISTEX) ointment 1 Each  1 Each Topical PRN Payal Florence MD        metoprolol succinate (TOPROL-XL) XL tablet 25 mg  25 mg Oral DAILY Sohail Acuna MD   25 mg at 03/17/20 1632    polyethylene glycol (MIRALAX) packet 17 g  17 g Oral DAILY PRN Rio Carbine C, DO   17 g at 02/23/20 1708    guaiFENesin (ROBITUSSIN) 100 mg/5 mL oral liquid 100 mg  100 mg Oral Q4H PRN Sohail Acuna MD   100 mg at 03/16/20 1348    hydrALAZINE (APRESOLINE) 20 mg/mL injection 20 mg  20 mg IntraVENous Q4H PRN Al Resendiz MD   20 mg at 12/23/19 0133    atorvastatin (LIPITOR) tablet 80 mg  80 mg Oral QHS Al Resendiz MD   80 mg at 03/16/20 2037    lisinopril (PRINIVIL, ZESTRIL) tablet 40 mg  40 mg Oral DAILY Al Resendiz MD   40 mg at 03/17/20 1745    sodium chloride (NS) flush 5-40 mL  5-40 mL IntraVENous PRN Al Resendiz MD   10 mL at 03/15/20 2241    ondansetron (ZOFRAN) injection 4 mg  4 mg IntraVENous Q4H PRN Al Resendiz MD        aspirin chewable tablet 81 mg  81 mg Oral DAILY Al Resendiz MD   81 mg at 03/17/20 1390    enoxaparin (LOVENOX) injection 40 mg  40 mg SubCUTAneous Q24H Al Resendiz MD   40 mg at 03/17/20 1332    dextrose 40% (GLUTOSE) oral gel 1 Tube  15 g Oral PRN Al Resendiz MD        glucagon [facility]) injection 1 mg  1 mg IntraMUSCular PRN Al Resendiz MD        dextrose (D50W) injection syrg 12.5-25 g  25-50 mL IntraVENous PRN Al Resendiz MD           Review of Systems negative with exception of pertinent positives noted above  PHYSICAL EXAM     Visit Vitals  /79 (BP 1 Location: Right arm, BP Patient Position: At rest)   Pulse 70   Temp 98.1 °F (36.7 °C)   Resp 16   Ht 5' 6\" (1.676 m)   Wt 95.3 kg (210 lb)   SpO2 95%   BMI 33.89 kg/m²      Temp (24hrs), Av °F (36.7 °C), Min:97.4 °F (36.3 °C), Max:98.3 °F (36.8 °C)    Oxygen Therapy  O2 Sat (%): 95 % (20 1200)  Pulse via Oximetry: 66 beats per minute (20 0000)  O2 Device: Room air (20 0000)  No intake or output data in the 24 hours ending 20 1457   General: No acute distress    Lungs: CTA bilaterally. Resp even and nonlabored  Heart:  S1S2 present without murmurs rubs gallops. RRR. No LE edema  Abdomen: Soft, non tender, non distended. BS present  Extremities: No cyanosis. Left sided hemiparesis  Neurologic:  Left sided hemiparesis, mildly slurred speech. Results summary of Diagnostic Studies/Procedures copied from within [facility] EMR:      [address] Problems    Diagnosis Date Noted    Hypomagnesemia 2020    PFO (patent foramen ovale) 2019    Arrhythmia 12/15/2019    Hyperlipemia     [facility] ([facility] [address] [facility]     Type 2 diabetes mellitus ([facility] [address]     Hypertension      Plan:  Acute embolic stroke  MRI brain showed acute to early subacute infarcts in the left cerebellar hemisphere, in the periventricular deep left frontoparietal white matter and likely additional areas of restricted diffusion in the right paramedian yariel.    +PFO on echo  On ASA, statin  Will need 4 week event monitor on DC, if no arrhythmia then will need PFO closure      Hypomagnesemia  Received IV, and on oral supplementation.      Hypertension  Continue metoprolol and ACE inhibitor  Goal BP <130/80     Type 2 diabetes mellitus:    Monitor, BGL controlled          Notes, labs, VS, diagnostic testing reviewed  Time spent with pt 35 min      DVT Prophylaxis: lovenox  Plan of Care Discussed with: Supervising MD Uribe Abundio Lobato, care team, pt      Wesly Led, NP

## 2020-03-17 NOTE — PROGRESS NOTES
Pt had phone evaluation with [facility] on 2/28/2020. Awaiting disability determination and approval for medicaid. Pt's spouse still maintains that she will not be able to care for him at home and wants him placed in a facility. SW following.

## 2020-03-17 NOTE — PROGRESS NOTES
Hourly rounds performed this shift, needs met at this time. Bed in low/locked position and call light within reach. Will continue to monitor and give bedside report to oncoming nurse. Statement Selected

## 2020-03-18 LAB — GLUCOSE BLD STRIP.AUTO-MCNC: 117 MG/DL (ref 65–100)

## 2020-03-18 PROCEDURE — 74011250636 HC RX REV CODE- 250/636: Performed by: INTERNAL MEDICINE

## 2020-03-18 PROCEDURE — 65270000029 HC RM PRIVATE

## 2020-03-18 PROCEDURE — 74011250637 HC RX REV CODE- 250/637: Performed by: INTERNAL MEDICINE

## 2020-03-18 PROCEDURE — 97535 SELF CARE MNGMENT TRAINING: CPT

## 2020-03-18 PROCEDURE — 82962 GLUCOSE BLOOD TEST: CPT

## 2020-03-18 PROCEDURE — 97168 OT RE-EVAL EST PLAN CARE: CPT

## 2020-03-18 PROCEDURE — 74011250637 HC RX REV CODE- 250/637: Performed by: HOSPITALIST

## 2020-03-18 PROCEDURE — 97530 THERAPEUTIC ACTIVITIES: CPT

## 2020-03-18 RX ADMIN — ENOXAPARIN SODIUM 40 MG: 40 INJECTION SUBCUTANEOUS at 13:54

## 2020-03-18 RX ADMIN — METOPROLOL SUCCINATE 25 MG: 25 TABLET, FILM COATED, EXTENDED RELEASE ORAL at 08:14

## 2020-03-18 RX ADMIN — DIPHENHYDRAMINE HYDROCHLORIDE 25 MG: 25 CAPSULE ORAL at 16:58

## 2020-03-18 RX ADMIN — LISINOPRIL 40 MG: 20 TABLET ORAL at 08:13

## 2020-03-18 RX ADMIN — Medication 400 MG: at 16:58

## 2020-03-18 RX ADMIN — ACETAMINOPHEN 650 MG: 325 TABLET, FILM COATED ORAL at 06:16

## 2020-03-18 RX ADMIN — ASPIRIN 81 MG 81 MG: 81 TABLET ORAL at 08:13

## 2020-03-18 RX ADMIN — ACETAMINOPHEN 650 MG: 325 TABLET, FILM COATED ORAL at 22:51

## 2020-03-18 RX ADMIN — Medication 400 MG: at 09:00

## 2020-03-18 RX ADMIN — ATORVASTATIN CALCIUM 80 MG: 80 TABLET, FILM COATED ORAL at 22:50

## 2020-03-18 RX ADMIN — ACETAMINOPHEN 650 MG: 325 TABLET, FILM COATED ORAL at 13:53

## 2020-03-18 RX ADMIN — GUAIFENESIN 100 MG: 100 SOLUTION ORAL at 16:58

## 2020-03-18 RX ADMIN — Medication 10 ML: at 06:16

## 2020-03-18 NOTE — PROGRESS NOTES
Problem: Patient Education: Go to Patient Education Activity  Goal: Patient/Family Education  Outcome: Progressing Towards Goal     Problem: Pressure Injury - Risk of  Goal: *Prevention of pressure injury  Description: Document Dewey Scale and appropriate interventions in the flowsheet. Outcome: Progressing Towards Goal  Note: Pressure Injury Interventions:  Sensory Interventions: Assess changes in LOC    Moisture Interventions: Absorbent underpads, Apply protective barrier, creams and emollients, Check for incontinence Q2 hours and as needed, Limit adult briefs, Minimize layers, Moisture barrier, Offer toileting Q_hr    Activity Interventions: Increase time out of bed, Pressure redistribution bed/mattress(bed type), PT/OT evaluation    Mobility Interventions: HOB 30 degrees or less, Pressure redistribution bed/mattress (bed type), PT/OT evaluation, Turn and reposition approx. every two hours(pillow and wedges)    Nutrition Interventions: Document food/fluid/supplement intake    Friction and Shear Interventions: HOB 30 degrees or less                Problem: Patient Education: Go to Patient Education Activity  Goal: Patient/Family Education  Outcome: Progressing Towards Goal     Problem: Falls - Risk of  Goal: *Absence of Falls  Description: Document Jeanne Fall Risk and appropriate interventions in the flowsheet.   Outcome: Progressing Towards Goal  Note: Fall Risk Interventions:  Mobility Interventions: Bed/chair exit alarm, Communicate number of staff needed for ambulation/transfer, Patient to call before getting OOB, Utilize walker, cane, or other assistive device    Mentation Interventions: Bed/chair exit alarm    Medication Interventions: Bed/chair exit alarm, Patient to call before getting OOB, Teach patient to arise slowly    Elimination Interventions: Bed/chair exit alarm, Call light in reach, Patient to call for help with toileting needs, Toilet paper/wipes in reach, Toileting schedule/hourly rounds, Urinal in reach    History of Falls Interventions: Bed/chair exit alarm, Door open when patient unattended         Problem: Patient Education: Go to Patient Education Activity  Goal: Patient/Family Education  Outcome: Progressing Towards Goal     Problem: Nutrition Deficit  Goal: *Optimize nutritional status  Outcome: Progressing Towards Goal     Problem: General Medical Care Plan  Goal: *Vital signs within specified parameters  Outcome: Progressing Towards Goal  Goal: *Labs within defined limits  Outcome: Progressing Towards Goal  Goal: *Absence of infection signs and symptoms  Description: Wash hand more often   Outcome: Progressing Towards Goal  Goal: *Optimal pain control at patient's stated goal  Outcome: Progressing Towards Goal  Goal: *Skin integrity maintained  Outcome: Progressing Towards Goal  Goal: *Fluid volume balance  Outcome: Progressing Towards Goal  Goal: *Optimize nutritional status  Outcome: Progressing Towards Goal  Goal: *Anxiety reduced or absent  Outcome: Progressing Towards Goal  Goal: *Progressive mobility and function (eg: ADL's)  Outcome: Progressing Towards Goal     Problem: Patient Education: Go to Patient Education Activity  Goal: Patient/Family Education  Outcome: Progressing Towards Goal     Problem: Patient Education: Go to Patient Education Activity  Goal: Patient/Family Education  Outcome: Progressing Towards Goal     Problem: Patient Education: Go to Patient Education Activity  Goal: Patient/Family Education  Outcome: Progressing Towards Goal     Problem: Patient Education: Go to Patient Education Activity  Goal: Patient/Family Education  Outcome: Progressing Towards Goal     Problem: Pain  Goal: *Control of Pain  Outcome: Progressing Towards Goal     Problem: Patient Education: Go to Patient Education Activity  Goal: Patient/Family Education  Outcome: Progressing Towards Goal

## 2020-03-18 NOTE — PROGRESS NOTES
Progress Note    3/18/2020  Admit Date: 2019  9:07 AM   NAME: Manjeet Clifford   :  1975   MRN:  [id number]   Attending: Rosalinda Estrada MD  PCP:  Nathaly Givens MD  Treatment Team: Attending Provider: Rosalinda Estrada MD; Care Manager: Kwan Keith, RN; Care Manager: Mariano Berg, [facility]; Utilization Review: Minoo Jones RN; Nurse Practitioner: Mortimer Hinders, NP; Nurse Practitioner: Andreea Oliveira, MARCELO; Charge Nurse: Lu Sifuentes, RN; Primary Nurse: Michoacano Murillo, JERRY; Charge Nurse: Melissa Stein; Physical Therapy Assistant: Efren Harvey PTA; Occupational Therapist: Juana Matute    Full Code   SUBJECTIVE:   Mr. Davey Stein is a 52 yo male with PMH of DM, HTN who presented with c/o left sided weakness and left facial droop 19. CT head showed age indeterminate infarctions within the left corona radiata/caudate. CTA head/neck showed stenosis at the distal segment of the right vertebral artery and multifocal stenosis in the P2 segment of the left posterior cerebral artery. MRI brain showed acute to early subacute infarcts in the left cerebellar hemisphere, in the periventricular deep left frontoparietal white matter and likely additional areas of restricted diffusion in the right paramedian yariel. Echo +PFO. On statin, ASA. Has been difficult to place in STR. Plans for 4 week event monitor on DC and if no arrhythmia PFO closure recommended.  Today moving right hand and forearm, still unable to squeeze my fingers.         Past Medical History:   Diagnosis Date    Acute ischemic stroke ([address] 2019    Acute pancreatitis 2014    Cerebrovascular accident (CVA) due to embolism ([address] 2019    Diabetes ([address] 2002    Diabetes ([address]     Diabetes mellitus     Hypertension      Recent Results (from the past 24 hour(s))   GLUCOSE, POC    Collection Time: 20  6:02 AM   Result Value Ref Range    Glucose (POC) 117 (H) 65 - 100 mg/dL     No Known Allergies  Current Facility-Administered Medications   Medication Dose Route Frequency Provider Last Rate Last Dose    magnesium oxide (MAG-OX) tablet 400 mg  400 mg Oral BID Kristi Becker DO   400 mg at 03/18/20 0900    acetaminophen (TYLENOL) tablet 650 mg  650 mg Oral Q4H PRN Lor Deleon MD        diphenhydrAMINE (BENADRYL) capsule 25 mg  25 mg Oral Q6H PRN Pattie Frances MD   25 mg at 03/17/20 1731    acetaminophen (TYLENOL) tablet 650 mg  650 mg Oral Grady Cadena MD   650 mg at 03/18/20 1193    lip protectant (BLISTEX) ointment 1 Each  1 Each Topical PRN Radha German MD        metoprolol succinate (TOPROL-XL) XL tablet 25 mg  25 mg Oral DAILY Lor Deleon MD   25 mg at 03/18/20 0814    polyethylene glycol (MIRALAX) packet 17 g  17 g Oral DAILY PRN Don Hall C, DO   17 g at 02/23/20 1708    guaiFENesin (ROBITUSSIN) 100 mg/5 mL oral liquid 100 mg  100 mg Oral Q4H PRN Lor Deleon MD   100 mg at 03/17/20 1731    hydrALAZINE (APRESOLINE) 20 mg/mL injection 20 mg  20 mg IntraVENous Q4H PRN Riddhi Smith MD   20 mg at 12/23/19 0133    atorvastatin (LIPITOR) tablet 80 mg  80 mg Oral QHS Riddhi Smith MD   80 mg at 03/17/20 2213    lisinopril (PRINIVIL, ZESTRIL) tablet 40 mg  40 mg Oral DAILY Riddhi Smith MD   40 mg at 03/18/20 0813    sodium chloride (NS) flush 5-40 mL  5-40 mL IntraVENous PRN Riddhi Smith MD   10 mL at 03/18/20 0616    ondansetron (ZOFRAN) injection 4 mg  4 mg IntraVENous Q4H PRN Riddhi Smith MD        aspirin chewable tablet 81 mg  81 mg Oral DAILY Riddhi Smith MD   81 mg at 03/18/20 0813    enoxaparin (LOVENOX) injection 40 mg  40 mg SubCUTAneous Q24H Riddhi Smith MD   40 mg at 03/17/20 1332    dextrose 40% (GLUTOSE) oral gel 1 Tube  15 g Oral PRN Riddhi Smith MD        glucagon [facility]) injection 1 mg  1 mg IntraMUSCular SHUN Smith MD        dextrose (D50W) injection syrg 12.5-25 g  25-50 mL IntraVENous PRN Melo Britton MD           Review of Systems negative with exception of pertinent positives noted above  PHYSICAL EXAM     Visit Vitals  /84 (BP 1 Location: Right arm, BP Patient Position: At rest)   Pulse 76   Temp 97.9 °F (36.6 °C)   Resp 16   Ht 5' 6\" (1.676 m)   Wt 95.3 kg (210 lb)   SpO2 99%   BMI 33.89 kg/m²      Temp (24hrs), Av °F (36.7 °C), Min:97.8 °F (36.6 °C), Max:98.2 °F (36.8 °C)    Oxygen Therapy  O2 Sat (%): 99 % (20 0800)  Pulse via Oximetry: 66 beats per minute (20 0000)  O2 Device: Room air (20 0000)    Intake/Output Summary (Last 24 hours) at 3/18/2020 1047  Last data filed at 3/18/2020 0825  Gross per 24 hour   Intake 360 ml   Output    Net 360 ml      General:       No acute distress    Lungs:          CTA bilaterally. Resp even and nonlabored  Heart:            S1S2 present without murmurs rubs gallops. RRR. No LE edema  Abdomen:    Soft, non tender, non distended. BS present  Extremities: No cyanosis. Left sided hemiparesis  Neurologic:  moves right hand and raises arm at elbow moderately, unable to squeeze my fingers left hand, mildly slurred speech. PERRLA, strength 4/5 RUE/RLE. Results summary of Diagnostic Studies/Procedures copied from within [facility] EMR:        [address] Problems    Diagnosis Date Noted    Hypomagnesemia 2020    PFO (patent foramen ovale) 2019    Arrhythmia 12/15/2019    Hyperlipemia     Acute embolic stroke ([address]     Type 2 diabetes mellitus ([facility] [address]     Hypertension      Plan:      Acute embolic stroke  MRI brain showed acute to early subacute infarcts in the left cerebellar hemisphere, in the periventricular deep left frontoparietal white matter and likely additional areas of restricted diffusion in the right paramedian yariel.    +PFO on echo  On ASA, statin  Will need 4 week event monitor on DC, if no arrhythmia then will need PFO closure      Hypomagnesemia  Received IV, and on oral supplementation.      Hypertension  Continue metoprolol and ACE inhibitor  Goal BP <130/80     Type 2 diabetes mellitus:    Monitor, BGL controlled           Notes, labs, VS, diagnostic testing reviewed  Time spent with pt 35 min        DVT Prophylaxis: lovenox  Plan of Care Discussed with: Supervising MD Dr. Burgess Mota, care team, pt        Brittany Roy, NP        Addendum  Given no visitor policy attempted to call wife to update on plan of care.   No answer

## 2020-03-18 NOTE — PROGRESS NOTES
Problem: Mobility Impaired (Adult and Pediatric)  Goal: *Acute Goals and Plan of Care  Description  GOALS UPDATED ON RE-ASSESSMENT 3/9/2020 (advancements to goals in bold):  1. Patient will perform bed mobility with supervision and 0 verbal cues within 7 treatment days. 2. Patient will perform transfer bed to chair with SUPERVISION within 7 treatment days. 3. Patient will demonstrate fair+ dynamic balance throughout stance phase on L LE within 7 treatment days. 4. Patient will require CGA throughout swing phase on (to advance) L LE within 7 treatment days. 5. Patient demonstrate 3+/5 strength in L LE hip flexion, hip abduction, and hip adduction within 7 treatment days. 6. Patient will ambulate 150ft+ with CGA and least retrictive assistive device within 7 treatment days. 7. Patient will perform wheelchair mobility x75ft with SUPERVISION within 7 treatment days. PHYSICAL THERAPY: Daily Note and AM 3/18/2020  INPATIENT: PT Visit Days : 5  Payor: MEDICAID PENDING / Plan: [place] PENDING / Product Type: Medicaid /       NAME/AGE/GENDER: Ebenezer Lima is a 53 y.o. male   PRIMARY DIAGNOSIS: Acute ischemic stroke ([address] [I63.9]  Cerebrovascular accident (CVA) due to embolism ([address] [Z06.6] Acute embolic stroke ([address] Acute embolic stroke ([address]       ICD-10: Treatment Diagnosis:   · Difficulty in walking, Not elsewhere classified (R26.2)  · Hemiplegia and hemiparesis following cerebral infarction affecting left non-dominant side (G82.083)   Precaution/Allergies:  Patient has no known allergies. ASSESSMENT:     Mr. Wendy Fischer is a 53year old admitted R MCA CVA. Patient was supine upon contact and agreeable to PT. Patient performs supine to sit with SBA and cues for technique. Patient transfers to standing with CGA and ambulates 120' then 100' (seated rest break in between) with CGA-min assist and cues for sequencing and quad/glut activation on LLE.  Chair follow for safety (therapist pulled chair behind patient). Patient then performs wheelchair mobility x 200' with SBA, cues for improved/proper technique and several rest breaks needed. Patient returns to EOB where he performs SPT with CGA and cues for technique. Patient returns to supine with SBA. Overall good progress towards physical therapy goals. Goals listed above are still appropriate. Will continue efforts as patient is still below functional baseline. This section established at most recent assessment   PROBLEM LIST (Impairments causing functional limitations):  1. Decreased Strength  2. Decreased ADL/Functional Activities  3. Decreased Transfer Abilities  4. Decreased Ambulation Ability/Technique  5. Decreased Balance  6. Increased Pain  7. Decreased Activity Tolerance  8. Increased Fatigue  9. Decreased Flexibility/Joint Mobility   INTERVENTIONS PLANNED: (Benefits and precautions of physical therapy have been discussed with the patient.)  1. Balance Exercise  2. Bed Mobility  3. Family Education  4. Gait Training  5. Home Exercise Program (HEP)  6. Manual Therapy  7. Neuromuscular Re-education/Strengthening  8. Range of Motion (ROM)  9. Therapeutic Activites  10. Therapeutic Exercise/Strengthening  11. Transfer Training     TREATMENT PLAN: Frequency/Duration: 3 times a week for duration of hospital stay  Rehabilitation Potential For Stated Goals: Good     REHAB RECOMMENDATIONS (at time of discharge pending progress):    Placement: It is my opinion, based on this patient's performance to date, that Mr. Anjel Gil may benefit from intensive therapy at an [address] [place] [address] after discharge due to a probable need for close medical supervision by a rehab physician, a probable need for multiple therapy disciplines and potential to make ongoing and sustainable functional improvement that is of practical value. .  Equipment:    TBD pending progress with therapy.              HISTORY:   History of Present Injury/Illness (Reason for Referral):  Per H&P: \"Pt is a 54 y/o smoker with DM, HTN, who presented to ER with L leg and arm weakness, L facial droop, dysarthria. First noted L leg weakness late night 12/11 when he woke up to go to the bathroom. He was normal when he went to bed around 1030. Woke up this morning and had persistent weakness L leg and also now noted in L arm. EMS called. Noted to have slurred speech and L facial droop as well. Code S called in ER around 9am.  MRI with acute infarcts in L cerebellar hemisphere, deep frontoparietal white matter, and R paramedian yariel. Also noted old lacunar infarcts. No large vessel occlusion on CTA, but some stenosis noted. No hx afib, TIA, CVA. No CP, palpitations, SOB. \"  Past Medical History/Comorbidities:   Mr. Romina Orlando  has a past medical history of Acute ischemic stroke ([address] (12/12/2019), Acute pancreatitis (11/19/2014), Cerebrovascular accident (CVA) due to embolism ([address] (12/12/2019), Diabetes ([address] (2002), Diabetes ([address] Diabetes mellitus, and Hypertension. He also has no past medical history of Arthritis, Asthma, Autoimmune disease ([address] CAD (coronary artery disease), Cancer ([address] Chronic kidney disease, COPD, Dementia, Dementia ([address] Heart failure ([address] Ill-defined condition, Infectious disease, Liver disease, Other ill-defined conditions(799.89), Psychiatric disorder, PUD (peptic ulcer disease), Seizures ([address] or Sleep disorder. Mr. Romina Orlando  has a past surgical history that includes hx hernia repair and hx orthopaedic.   Social History/Living Environment:   Home Environment: Apartment  # Steps to Enter: 12  Rails to Enter: Yes  [employer] : Bilateral  One/Two Story Residence: One story  Living Alone: No  Support Systems: Spouse/Significant Other/Partner  Patient Expects to be Discharged to[de-identified] Unknown  Current DME Used/Available at Home: None  Tub or Shower Type: Tub/Shower combination  Prior Level of Function/Work/Activity:  Independent, lives with wife in 2nd story 1 level apartment. No recent falls. Number of Personal Factors/Comorbidities that affect the Plan of Care: 1-2: MODERATE COMPLEXITY   EXAMINATION:   Most Recent Physical Functioning:   Gross Assessment:                  Posture:     Balance:  Sitting: Intact  Standing: Impaired  Standing - Static: Fair  Standing - Dynamic : Fair Bed Mobility:  Supine to Sit: Stand-by assistance  Sit to Supine: Stand-by assistance  Wheelchair Mobility:  Distance (ft): 200 feet  Level of Assistance: Stand-by assistance  Interventions: Safety awareness training; Tactile cues; Verbal cues  Transfers:  Sit to Stand: Contact guard assistance  Stand to Sit: Contact guard assistance  Gait:     Base of Support: Center of gravity altered;Narrowed  Speed/Clotilde: Slow  Step Length: Left shortened;Right shortened  Gait Abnormalities: Hemiplegic  Distance (ft): (120' then 100')  Assistive Device: Walker luke  Ambulation - Level of Assistance: Contact guard assistance;Minimal assistance  Interventions: Safety awareness training;Verbal cues; Tactile cues;Manual cues      Body Structures Involved:  1. Nerves  2. Voice/Speech  3. Bones  4. Joints  5. Muscles Body Functions Affected:  1. Mental  2. Sensory/Pain  3. Neuromusculoskeletal  4. Movement Related Activities and Participation Affected:  1. General Tasks and Demands  2. Mobility  3. Self Care  4. Interpersonal Interactions and Relationships   Number of elements that affect the Plan of Care: 4+: HIGH COMPLEXITY   CLINICAL PRESENTATION:   Presentation: Evolving clinical presentation with changing clinical characteristics: MODERATE COMPLEXITY   CLINICAL DECISION MAKIN [facility] Mobility Inpatient Short Form  How much difficulty does the patient currently have. .. Unable A Lot A Little None   1. Turning over in bed (including adjusting bedclothes, sheets and blankets)? [] 1   [] 2   [] 3   [x] 4   2.   Sitting down on and standing up from a chair with arms ( e.g., wheelchair, bedside commode, etc.)   [] 1   [] 2   [x] 3   [] 4   3. Moving from lying on back to sitting on the side of the bed? [] 1   [] 2   [x] 3   [] 4   How much help from another person does the patient currently need. .. Total A Lot A Little None   4. Moving to and from a bed to a chair (including a wheelchair)? [] 1   [] 2   [x] 3   [] 4   5. Need to walk in hospital room? [] 1   [] 2   [x] 3   [] 4   6. Climbing 3-5 steps with a railing? [] 1   [x] 2   [] 3   [] 4   © 2007, Trustees of [address] [place] [address] [address] [address], under license to [employer]. All rights reserved      Score:  Initial: 13 Most Recent: 18 (Date:3/9/2020)    Interpretation of Tool:  Represents activities that are increasingly more difficult (i.e. Bed mobility, Transfers, Gait). Medical Necessity:     · Patient is expected to demonstrate progress in   · strength, range of motion, balance, coordination, and functional technique  ·  to   · increase independence with all mobility. · .  Reason for Services/Other Comments:  · Patient continues to require skilled intervention due to   · medical complications and mobility deficits which impact his level of function, safety, and independence as indicated above. · .   Use of outcome tool(s) and clinical judgement create a POC that gives a: Questionable prediction of patient's progress: MODERATE COMPLEXITY        TREATMENT:      Pre-treatment Symptoms/Complaints:  none  Pain: Initial: 0/10 Post Session:  0/10     Therapeutic Activity: (    45 Minutes): Therapeutic activities including bed mobility training, transfer training, ambulation on level ground, instruction in sequencing with luke walker, scooting, wheelchair mobility, SPT training, and patient education  to improve mobility, strength and balance. Required moderate Safety awareness training;Verbal cues; Tactile cues;Manual cues to promote static and dynamic balance in standing and promote coordination of bilateral, lower extremity(s). Braces/Orthotics/Lines/Etc:   · Sling to L UE throughout transfers and ambulation  Treatment/Session Assessment:    · Response to Treatment:  See above  · Interdisciplinary Collaboration:   o Physical Therapy Assistant  o Registered Nurse  o Rehabilitation Attendant  · After treatment position/precautions:   o Supine in bed  o Bed/Chair-wheels locked  o Bed in low position  o Call light within reach  o RN notified   · Compliance with Program/Exercises: Compliant all of the time  · Recommendations/Intent for next treatment session: \"Next visit will focus on advancements to more challenging activities and reduction in assistance provided\".     Total Treatment Duration:  PT Patient Time In/Time Out  Time In: 0955  Time Out: 1800 [facility]

## 2020-03-18 NOTE — PROGRESS NOTES
Problem: Self Care Deficits Care Plan (Adult)  Goal: *Acute Goals and Plan of Care (Insert Text)  Description  GOALS UPDATED : 3/3/2020   (Met, 3/3/2020)  2. Patient will demonstrate appropriate safety awareness and protection of L UE during bed mobility and functional transfers with minimal cues. (Progressing, still needs cues, 3/3/2020)  3. Patient will complete total body bathing and dressing with moderate assistance and adaptive equipment as needed. (Progressing, UB bathing Min A, UB dressing at Mod A, LB dressing at Dep, 3/3/2020)  4. Patient will complete weightbearing into the L UE with ADL tasks with minimal assistance to improve ability to use as a functional assist during ADL tasks. (Progressing,3/3/2020)5. Patient will demonstrate L UE SROM HEP within 7 days. Progressing 3/3/2020  8. (Goal Met)  9. Pt will complete bed mobility at mod I. New goal  10. Pt will complete dynamic sitting balance for ADLs at mod I with good balance. New goal  11. Pt will complete functional transfers at [facility] to [place] [address] with equipment as needed. New goal  12. Pt will complete grooming tasks in standing at sink level x5 mins with good balance and equipment as needed    Goals per Re-evaluation on 3/18/2020:   1. Patient will demonstrate appropriate safety awareness and protection of L UE during bed mobility and functional transfers with minimal cues. (Progressing, still needs cues, 3/18/2020)  2. Patient will complete total body bathing and dressing with Min A and adaptive equipment as needed. (Progressing, UB bathing Min A, UB dressing at [address] [place] [address] , LB dressing at [facility], 3/18/2020)  3. Patient will complete weightbearing into the L UE with ADL tasks with minimal assistance to improve ability to use as a functional assist during ADL tasks. (Progressing, 3/18/2020)4. Patient will demonstrate L UE SROM HEP within 7 days. (Progressing, 3/18/2020)  5.  Pt will complete bed mobility with Mod I and minimal verbal cueing (Progressing, SBA 3/18/2020). 6. Pt will complete dynamic sitting balance for ADLs at mod I with good balance (Progressing, 3/18/2020)  7. Pt will complete functional transfers with Supervision with equipment as needed. (Progressing, SBA 3/18/2020)  8. Pt will complete grooming tasks in standing at sink level x5 mins with good balance and equipment as needed (Progressing, CGA with fair balance 3/18/2020)  9. Pt will complete grooming standing at sink level with SBA and use of adaptive equipment as needed. 10. Pt will don/doff UE sling with SBA and use of minimal verbal and visual cueing for correct application. Outcome: Progressing Towards Goal     OCCUPATIONAL THERAPY: Daily Note, Re-evaluation and PM    3/18/2020  INPATIENT: OT Visit Days: 1  Payor: MEDICAID PENDING / Plan: Alvin Muse PENDING / Product Type: Medicaid /      NAME/AGE/GENDER: Tammy Montes is a 53 y.o. male   PRIMARY DIAGNOSIS:  Acute ischemic stroke ([address] [I63.9]  Cerebrovascular accident (CVA) due to embolism ([address] [D21.1] Acute embolic stroke ([address] Acute embolic stroke ([address]       ICD-10: Treatment Diagnosis:    · Generalized Muscle Weakness (M62.81)  · Other lack of cordination (R27.8)  · Hemiplegia and hemiparesis following cerebral infarction affecting   · left non-dominant side (I69.354)  · Abnormal posture (R29.3)   Precautions/Allergies:    NO PULLING ON LUE   LUE in sling with shoulder joint approximated and supported, needs taping   Patient has no known allergies. ASSESSMENT:     Mr. Radha Roldan presents to the hospital with L sided weakness and acute ischemic CVA. MRI revealed acute to subacute L cerebellar and R paramedian yariel infarcts. 3/18/2020: Pt seen today for OT re-evaluation. Pt supine in bed breathing room air. Alert and oriented x 4 and cooperative with therapy. Reports no pain prior to or following functional mobility. Completes bed mobility with SBA and increased time. Seated static and dynamic balance intact.   R UE WFL for ROM, strength, and coordination. L UE still grossly decreased for ROM, strength, and coordination, although pt showing increased movement since admit. Pt with full PROM for L UE elbow flexion and extension and presents with slight movement in digits 3, 4, & 5 in L hand. Pt performs transfer from sit to stand with CGA and use of luke- walker. Fair static and dynamic standing balance. Pt walks from bed to sink with Min A and use of luke-walker. Pt able to stand at sink with CGA and use of R UE on edge of sink for increased support. Pt educated on use of two-handed technique to increase independence and functional use of L UE. Pt able to brush teeth with Min A using two-handed technique with R UE stabilizing L UE. Pt walks from sink to recliner chair with Min A and use of luke-walker. Moderate verbal cueing to maintain luke-walker within close proximity to increase safety and stability when walking. Seated in recliner chair, pt educated on one-handed dressing technique. Pt dons/doffs hospital gown with Min A and use of one-handed dressing technique. Practiced donning UE sling and reminded pt to wear when performing out of bed mobility. Pt asked for drink and required Set Up to self-feed. Pt left seated upright in recliner chair with all needs met and within reach at this time. Pt is continuing to make progress towards acute care goals. Will continue POC. This section established at most recent assessment   PROBLEM LIST (Impairments causing functional limitations):  1. Decreased Strength  2. Decreased ADL/Functional Activities  3. Decreased Transfer Abilities  4. Decreased Ambulation Ability/Technique  5. Decreased Balance  6. Decreased Activity Tolerance  7. Increased Fatigue  8. Decreased Flexibility/Joint Mobility  9. Decreased Knowledge of Precautions  10.  Decreased [place] with Home Exercise Program   INTERVENTIONS PLANNED: (Benefits and precautions of occupational therapy have been discussed with the patient.)  1. Activities of daily living training  2. Adaptive equipment training  3. Balance training  4. Clothing management  5. Cognitive training  6. Donning&doffing training  7. Keanu tech training  8. Neuromuscular re-eduation  9. Therapeutic activity  10. Therapeutic exercise     TREATMENT PLAN: Frequency/Duration: Follow patient 3 times per week to address above goals. Rehabilitation Potential For Stated Goals: Excellent     REHAB RECOMMENDATIONS (at time of discharge pending progress):    Placement: It is my opinion, based on this patient's performance to date, that Mr. Jaonne Beach may benefit from intensive therapy at an [address] [place] [address] after discharge due to potential to make ongoing and sustainable functional improvement that is of practical value. Jose Camacho Pt functioning far below independent baseline, demonstrating good improvement and participation. Pt would likely benefit greatly and increase independence from inpatient rehab stay. Equipment:    TBD               OCCUPATIONAL PROFILE AND HISTORY:   History of Present Injury/Illness (Reason for Referral):  See H&P  Past Medical History/Comorbidities:   Mr. Joanne Beach  has a past medical history of Acute ischemic stroke ([address] (12/12/2019), Acute pancreatitis (11/19/2014), Cerebrovascular accident (CVA) due to embolism ([address] (12/12/2019), Diabetes ([address] (2002), Diabetes ([address] Diabetes mellitus, and Hypertension. He also has no past medical history of Arthritis, Asthma, Autoimmune disease ([address] CAD (coronary artery disease), Cancer ([address] Chronic kidney disease, COPD, Dementia, Dementia ([address] Heart failure ([address] Ill-defined condition, Infectious disease, Liver disease, Other ill-defined conditions(799.89), Psychiatric disorder, PUD (peptic ulcer disease), Seizures ([address] or Sleep disorder. Mr. Joanne Beach  has a past surgical history that includes hx hernia repair and hx orthopaedic.   Social History/Living Environment:   Home Environment: Apartment  # Steps to Enter: 12  Rails to Enter: Yes  Hand Rails : Bilateral  One/Two Story Residence: One story  Living Alone: No  Support Systems: Spouse/Significant Other/Partner  Patient Expects to be Discharged to[de-identified] Unknown  Current DME Used/Available at Home: None  Tub or Shower Type: Tub/Shower combination  Prior Level of Function/Work/Activity:  Pt lives at home with his wife. Pt is typically independent with ADL/functional mobility. Pt does not drive. Pt was working part-time at [employer]. Personal Factors:          Age:  53 y.o. Past/Current Experience:  CVA with flaccid L side        Other factors that influence how disability is experienced by the patient:  multiple co-morbidities    Number of Personal Factors/Comorbidities that affect the Plan of Care: Extensive review of physical, cognitive, and psychosocial performance (3+):  HIGH COMPLEXITY   ASSESSMENT OF OCCUPATIONAL PERFORMANCE[de-identified]   Activities of Daily Living:   Basic ADLs (From Assessment) Complex ADLs (From Assessment)   Feeding: Setup  Oral Facial Hygiene/Grooming: Minimum assistance  Bathing: Moderate assistance  Upper Body Dressing: Minimum assistance  Lower Body Dressing: Maximum assistance  Toileting: Moderate assistance Instrumental ADL  Meal Preparation: Total assistance  Homemaking: Total assistance  Medication Management: Total assistance  Financial Management: Total assistance   Grooming/Bathing/Dressing Activities of Daily Living   Grooming  Brushing Teeth: Minimum assistance     Upper Body Bathing  Bathing Assistance: Min A   Position Performed: Seated in chair  Feeding  Container Management: Set-up         Upper [address]: Minimum  assistance     Lower Body Dressing Assistance  Socks:  Total assistance (dependent) Bed/Mat Mobility  Supine to Sit: Stand-by assistance  Sit to Supine: Stand-by assistance  Sit to Stand: Contact guard assistance  Stand to Sit: Contact guard assistance     Most Recent Physical Functioning:   Gross Assessment:                  Posture:     Balance:  Sitting: Intact  Standing: Impaired  Standing - Static: Fair  Standing - Dynamic : Fair Bed Mobility:  Supine to Sit: Stand-by assistance  Sit to Supine: Stand-by assistance  Wheelchair Mobility:  Distance (ft): 200 feet  Level of Assistance: Stand-by assistance  Interventions: Safety awareness training; Tactile cues; Verbal cues  Transfers:  Sit to Stand: Contact guard assistance  Stand to Sit: Contact guard assistance            Patient Vitals for the past 6 hrs:   BP BP Patient Position SpO2 Pulse   20 1200 117/79 At rest 98 % 67       Mental Status  Neurologic State: Alert  Orientation Level: Oriented X4  Cognition: Follows commands  Perception: Verbal, Cues to attend to left side of body, Cues to attend left visual field, Visual, Cues to maintain midline in sitting, Cues to maintain midline in standing, Tactile  Perseveration: No perseveration noted  Safety/Judgement: Fall prevention, Decreased awareness of need for assistance, Decreased awareness of need for safety, Decreased awareness of environment, Decreased insight into deficits                          Physical Skills Involved:  1. Range of Motion  2. Balance  3. Strength  4. Activity Tolerance  5. Fine Motor Control  6. Gross Motor Control Cognitive Skills Affected (resulting in the inability to perform in a timely and safe manner):  1. Perception  2. Expression Psychosocial Skills Affected:  1. Habits/Routines  2. Environmental Adaptation  3. Social Interaction  4. Emotional Regulation  5. Self-Awareness  6. Awareness of Others  7. Social Roles   Number of elements that affect the Plan of Care: 5+:  HIGH COMPLEXITY   CLINICAL DECISION MAKIN hospitals [address] [address] AM-PAC 6 Clicks   Daily Activity Inpatient Short Form  How much help from another person does the patient currently need. .. Total A Lot A Little None   1.   Putting on and taking off regular lower body clothing? [] 1   [x] 2   [] 3   [] 4   2. Bathing (including washing, rinsing, drying)? [] 1   [x] 2   [] 3   [] 4   3. Toileting, which includes using toilet, bedpan or urinal?   [] 1   [x] 2   [] 3   [] 4   4. Putting on and taking off regular upper body clothing? [] 1   [] 2   [x] 3   [] 4   5. Taking care of personal grooming such as brushing teeth? [] 1   [] 2   [x] 3   [] 4   6. Eating meals? [] 1   [] 2   [x] 3   [] 4   © 2007, Trustees of [address] [place] [address] [address] [address], under license to [employer]. All rights reserved      Score:  Initial: 11 Most Recent: 15 (Date: 3/18/2020 )    Interpretation of Tool:  Represents activities that are increasingly more difficult (i.e. Bed mobility, Transfers, Gait). Medical Necessity:     · Patient demonstrates   · good and excellent  ·  rehab potential due to higher previous functional level. Reason for Services/Other Comments:  · Patient continues to require skilled intervention due to   · Decreased independence with ADL/functional transfers that impacts overall quality of life. · .   Use of outcome tool(s) and clinical judgement create a POC that gives a: MODERATE COMPLEXITY         TREATMENT:   (In addition to Assessment/Re-Assessment sessions the following treatments were rendered)     Pre-treatment Symptoms/Complaints:  \"I know I need to be wearing it. \" In reference to UE sling. Pain: Initial:   Pain Intensity 1: 0  Post Session: Unchanged     Self Care: (23 minutes): Procedure(s) utilized to improve and/or restore self-care/home management as related to dressing, grooming and self feeding. Required moderate visual, verbal, manual and   cueing to facilitate activities of daily living skills and compensatory activities. Pt educated on two-handed technique to brush teeth. Pt completed standing at sink level with CGA and Min A to increase use of L UE. Pt educated one one-handed dressing technique. Pt was able to complete with Min A to don/[facility] gown.  Pt educated on one-handed technique to don UE sling. Practiced performing 2x and pt required Min A.     Braces/Orthotics/Lines/Etc:   · O2 device: Room air  · Treatment/Session Assessment:    · Response to Treatment:  Pt making good progress towards goals and is a great participant. · Interdisciplinary Collaboration:   o Physical Therapy Assistant  o Occupational Therapist  o Registered Nurse  o Certified Nursing Assistant/Patient Care Technician  · After treatment position/precautions:   o Up in chair  o Bed/Chair-wheels locked  o Bed in low position  o Call light within reach  o RN notified  o Posey Chair Alarm in Place   · Compliance with Program/Exercises: Compliant all of the time, Will assess as treatment progresses. · Recommendations/Intent for next treatment session: \"Next visit will focus on advancements to more challenging activities and reduction in assistance provided\".   Total Treatment Duration:   OT Patient Time In/Time Out  Time In: 1415  Time Out: 1450     Mary Fairchild

## 2020-03-19 LAB
ALBUMIN SERPL-MCNC: 2.7 G/DL (ref 3.5–5)
ALBUMIN/GLOB SERPL: 0.7 {RATIO} (ref 1.2–3.5)
ALP SERPL-CCNC: 72 U/L (ref 50–136)
ALT SERPL-CCNC: 23 U/L (ref 12–65)
ANION GAP SERPL CALC-SCNC: 6 MMOL/L (ref 7–16)
AST SERPL-CCNC: 17 U/L (ref 15–37)
BASOPHILS # BLD: 0 K/UL (ref 0–0.2)
BASOPHILS NFR BLD: 1 % (ref 0–2)
BILIRUB SERPL-MCNC: 0.4 MG/DL (ref 0.2–1.1)
BUN SERPL-MCNC: 20 MG/DL (ref 6–23)
CALCIUM SERPL-MCNC: 9.8 MG/DL (ref 8.3–10.4)
CHLORIDE SERPL-SCNC: 108 MMOL/L (ref 98–107)
CO2 SERPL-SCNC: 26 MMOL/L (ref 21–32)
CREAT SERPL-MCNC: 1.19 MG/DL (ref 0.8–1.5)
DIFFERENTIAL METHOD BLD: ABNORMAL
EOSINOPHIL # BLD: 0.1 K/UL (ref 0–0.8)
EOSINOPHIL NFR BLD: 1 % (ref 0.5–7.8)
ERYTHROCYTE [DISTWIDTH] IN BLOOD BY AUTOMATED COUNT: 17.3 % (ref 11.9–14.6)
GLOBULIN SER CALC-MCNC: 3.8 G/DL (ref 2.3–3.5)
GLUCOSE BLD STRIP.AUTO-MCNC: 127 MG/DL (ref 65–100)
GLUCOSE SERPL-MCNC: 134 MG/DL (ref 65–100)
HCT VFR BLD AUTO: 37 % (ref 41.1–50.3)
HGB BLD-MCNC: 11.8 G/DL (ref 13.6–17.2)
IMM GRANULOCYTES # BLD AUTO: 0 K/UL (ref 0–0.5)
IMM GRANULOCYTES NFR BLD AUTO: 0 % (ref 0–5)
LYMPHOCYTES # BLD: 2.4 K/UL (ref 0.5–4.6)
LYMPHOCYTES NFR BLD: 51 % (ref 13–44)
MCH RBC QN AUTO: 26.2 PG (ref 26.1–32.9)
MCHC RBC AUTO-ENTMCNC: 31.9 G/DL (ref 31.4–35)
MCV RBC AUTO: 82.2 FL (ref 79.6–97.8)
MONOCYTES # BLD: 0.3 K/UL (ref 0.1–1.3)
MONOCYTES NFR BLD: 7 % (ref 4–12)
NEUTS SEG # BLD: 1.9 K/UL (ref 1.7–8.2)
NEUTS SEG NFR BLD: 39 % (ref 43–78)
NRBC # BLD: 0 K/UL (ref 0–0.2)
PLATELET # BLD AUTO: 207 K/UL (ref 150–450)
PMV BLD AUTO: 11.5 FL (ref 9.4–12.3)
POTASSIUM SERPL-SCNC: 4.3 MMOL/L (ref 3.5–5.1)
PROT SERPL-MCNC: 6.5 G/DL (ref 6.3–8.2)
RBC # BLD AUTO: 4.5 M/UL (ref 4.23–5.6)
SODIUM SERPL-SCNC: 140 MMOL/L (ref 136–145)
WBC # BLD AUTO: 4.7 K/UL (ref 4.3–11.1)

## 2020-03-19 PROCEDURE — 74011250636 HC RX REV CODE- 250/636: Performed by: INTERNAL MEDICINE

## 2020-03-19 PROCEDURE — 97110 THERAPEUTIC EXERCISES: CPT

## 2020-03-19 PROCEDURE — 85025 COMPLETE CBC W/AUTO DIFF WBC: CPT

## 2020-03-19 PROCEDURE — 74011250637 HC RX REV CODE- 250/637: Performed by: HOSPITALIST

## 2020-03-19 PROCEDURE — 36415 COLL VENOUS BLD VENIPUNCTURE: CPT

## 2020-03-19 PROCEDURE — 74011250637 HC RX REV CODE- 250/637: Performed by: INTERNAL MEDICINE

## 2020-03-19 PROCEDURE — 65270000029 HC RM PRIVATE

## 2020-03-19 PROCEDURE — 92507 TX SP LANG VOICE COMM INDIV: CPT

## 2020-03-19 PROCEDURE — 97535 SELF CARE MNGMENT TRAINING: CPT

## 2020-03-19 PROCEDURE — 80053 COMPREHEN METABOLIC PANEL: CPT

## 2020-03-19 PROCEDURE — 97530 THERAPEUTIC ACTIVITIES: CPT

## 2020-03-19 PROCEDURE — 82962 GLUCOSE BLOOD TEST: CPT

## 2020-03-19 RX ADMIN — METOPROLOL SUCCINATE 25 MG: 25 TABLET, FILM COATED, EXTENDED RELEASE ORAL at 08:49

## 2020-03-19 RX ADMIN — ACETAMINOPHEN 650 MG: 325 TABLET, FILM COATED ORAL at 13:36

## 2020-03-19 RX ADMIN — ASPIRIN 81 MG 81 MG: 81 TABLET ORAL at 08:49

## 2020-03-19 RX ADMIN — ACETAMINOPHEN 650 MG: 325 TABLET, FILM COATED ORAL at 05:15

## 2020-03-19 RX ADMIN — ACETAMINOPHEN 650 MG: 325 TABLET, FILM COATED ORAL at 22:16

## 2020-03-19 RX ADMIN — Medication 400 MG: at 08:49

## 2020-03-19 RX ADMIN — Medication 400 MG: at 17:31

## 2020-03-19 RX ADMIN — DIPHENHYDRAMINE HYDROCHLORIDE 25 MG: 25 CAPSULE ORAL at 17:31

## 2020-03-19 RX ADMIN — GUAIFENESIN 100 MG: 100 SOLUTION ORAL at 17:31

## 2020-03-19 RX ADMIN — GUAIFENESIN 100 MG: 100 SOLUTION ORAL at 22:19

## 2020-03-19 RX ADMIN — ENOXAPARIN SODIUM 40 MG: 40 INJECTION SUBCUTANEOUS at 13:36

## 2020-03-19 RX ADMIN — ATORVASTATIN CALCIUM 80 MG: 80 TABLET, FILM COATED ORAL at 22:16

## 2020-03-19 RX ADMIN — LISINOPRIL 40 MG: 20 TABLET ORAL at 08:49

## 2020-03-19 NOTE — PROGRESS NOTES
Problem: Patient Education: Go to Patient Education Activity  Goal: Patient/Family Education  Outcome: Progressing Towards Goal     Problem: Pressure Injury - Risk of  Goal: *Prevention of pressure injury  Description: Document Dewey Scale and appropriate interventions in the flowsheet. Outcome: Progressing Towards Goal  Note: Pressure Injury Interventions:  Sensory Interventions: Assess changes in LOC    Moisture Interventions: Absorbent underpads    Activity Interventions: Increase time out of bed    Mobility Interventions: HOB 30 degrees or less    Nutrition Interventions: Document food/fluid/supplement intake    Friction and Shear Interventions: HOB 30 degrees or less                Problem: Patient Education: Go to Patient Education Activity  Goal: Patient/Family Education  Outcome: Progressing Towards Goal     Problem: Falls - Risk of  Goal: *Absence of Falls  Description: Document Jeanne Fall Risk and appropriate interventions in the flowsheet. Outcome: Progressing Towards Goal  Note: Fall Risk Interventions:  Mobility Interventions: Bed/chair exit alarm    Mentation Interventions: Bed/chair exit alarm    Medication Interventions: Bed/chair exit alarm    Elimination Interventions: Bed/chair exit alarm    History of Falls Interventions: Bed/chair exit alarm         Problem: Patient Education: Go to Patient Education Activity  Goal: Patient/Family Education  Outcome: Progressing Towards Goal     Problem: Diabetes Self-Management  Goal: *Disease process and treatment process  Description: Define diabetes and identify own type of diabetes; list 3 options for treating diabetes. Outcome: Progressing Towards Goal  Goal: *Incorporating nutritional management into lifestyle  Description: Describe effect of type, amount and timing of food on blood glucose; list 3 methods for planning meals.   Outcome: Progressing Towards Goal  Goal: *Incorporating physical activity into lifestyle  Description: State effect of exercise on blood glucose levels. Outcome: Progressing Towards Goal  Goal: *Developing strategies to promote health/change behavior  Description: Define the ABC's of diabetes; identify appropriate screenings, schedule and personal plan for screenings. Outcome: Progressing Towards Goal  Goal: *Using medications safely  Description: State effect of diabetes medications on diabetes; name diabetes medication taking, action and side effects. Outcome: Progressing Towards Goal  Goal: *Monitoring blood glucose, interpreting and using results  Description: Identify recommended blood glucose targets  and personal targets. Outcome: Progressing Towards Goal  Goal: *Prevention, detection, treatment of acute complications  Description: List symptoms of hyper- and hypoglycemia; describe how to treat low blood sugar and actions for lowering  high blood glucose level. Outcome: Progressing Towards Goal  Goal: *Prevention, detection and treatment of chronic complications  Description: Define the natural course of diabetes and describe the relationship of blood glucose levels to long term complications of diabetes.   Outcome: Progressing Towards Goal  Goal: *Developing strategies to address psychosocial issues  Description: Describe feelings about living with diabetes; identify support needed and support network  Outcome: Progressing Towards Goal     Problem: Patient Education: Go to Patient Education Activity  Goal: Patient/Family Education  Outcome: Progressing Towards Goal     Problem: Patient Education: Go to Patient Education Activity  Goal: Patient/Family Education  Outcome: Progressing Towards Goal     Problem: Nutrition Deficit  Goal: *Optimize nutritional status  Outcome: Progressing Towards Goal     Problem: Patient Education: Go to Patient Education Activity  Goal: Patient/Family Education  Outcome: Progressing Towards Goal     Problem: General Medical Care Plan  Goal: *Vital signs within specified parameters  Outcome: Progressing Towards Goal  Goal: *Labs within defined limits  Outcome: Progressing Towards Goal  Goal: *Absence of infection signs and symptoms  Description: Wash hand more often   Outcome: Progressing Towards Goal  Goal: *Optimal pain control at patient's stated goal  Outcome: Progressing Towards Goal  Goal: *Skin integrity maintained  Outcome: Progressing Towards Goal  Goal: *Fluid volume balance  Outcome: Progressing Towards Goal  Goal: *Optimize nutritional status  Outcome: Progressing Towards Goal  Goal: *Anxiety reduced or absent  Outcome: Progressing Towards Goal  Goal: *Progressive mobility and function (eg: ADL's)  Outcome: Progressing Towards Goal     Problem: Patient Education: Go to Patient Education Activity  Goal: Patient/Family Education  Outcome: Progressing Towards Goal     Problem: Patient Education: Go to Patient Education Activity  Goal: Patient/Family Education  Outcome: Progressing Towards Goal     Problem: Patient Education: Go to Patient Education Activity  Goal: Patient/Family Education  Outcome: Progressing Towards Goal     Problem: Patient Education: Go to Patient Education Activity  Goal: Patient/Family Education  Outcome: Progressing Towards Goal     Problem: Ischemic Stroke: Discharge Outcomes  Goal: *Verbalizes anxiety and depression are reduced or absent  Outcome: Progressing Towards Goal  Goal: *Verbalize understanding of risk factor modification(Stroke Metric)  Outcome: Progressing Towards Goal  Goal: *Hemodynamically stable  Outcome: Progressing Towards Goal  Goal: *Absence of aspiration pneumonia  Outcome: Progressing Towards Goal  Goal: *Aware of needed dietary changes  Outcome: Progressing Towards Goal  Goal: *Verbalize understanding of prescribed medications including anti-coagulants, anti-lipid, and/or anti-platelets(Stroke Metric)  Outcome: Progressing Towards Goal  Goal: *Tolerating diet  Outcome: Progressing Towards Goal  Goal: *Aware of follow-up diagnostics related to anticoagulants  Outcome: Progressing Towards Goal  Goal: *Ability to perform ADLs and demonstrates progressive mobility and function  Outcome: Progressing Towards Goal  Goal: *Absence of DVT(Stroke Metric)  Outcome: Progressing Towards Goal  Goal: *Absence of aspiration  Outcome: Progressing Towards Goal  Goal: *Optimal pain control at patient's stated goal  Outcome: Progressing Towards Goal  Goal: *Home safety concerns addressed  Outcome: Progressing Towards Goal  Goal: *Describes available resources and support systems  Outcome: Progressing Towards Goal  Goal: *Verbalizes understanding of activation of EMS(911) for stroke symptoms(Stroke Metric)  Outcome: Progressing Towards Goal  Goal: *Understands and describes signs and symptoms to report to providers(Stroke Metric)  Outcome: Progressing Towards Goal  Goal: *Neurolgocially stable (absence of additional neurological deficits)  Outcome: Progressing Towards Goal  Goal: *Verbalizes importance of follow-up with primary care physician(Stroke Metric)  Outcome: Progressing Towards Goal  Goal: *Smoking cessation discussed,if applicable(Stroke Metric)  Outcome: Progressing Towards Goal  Goal: *Depression screening completed(Stroke Metric)  Outcome: Progressing Towards Goal     Problem: Pain  Goal: *Control of Pain  Outcome: Progressing Towards Goal     Problem: Patient Education: Go to Patient Education Activity  Goal: Patient/Family Education  Outcome: Progressing Towards Goal     Problem: Patient Education: Go to Patient Education Activity  Goal: Patient/Family Education  Outcome: Progressing Towards Goal

## 2020-03-19 NOTE — PROGRESS NOTES
Neurolinguistic Goals: goals per progress note 3/10/2020  LTG: Patient will increase neuro-linguistic abilities to increase safety and awareness in functional living environment   STG: Patient will solve mathematical problems with 80%accuracy given minimal cueing. Not met 3/10/2020  STG: Patient will name 10 items to  abstract category in 1 minute given  moderate cueing. Progressing 1/31/2020. MET&edited 3/10/2020  STG: Patient will participate in verbal reasoning tasks with 80% accuracy given minimal cueing. Progressing 3/10/2020  STG: Patient will complete mental manipulation tasks with 80% accuracy given minimal cueing. Not met 3/10/2020  STG: Patient will complete working memory/attention tasks with 80% accuracy given minimal cueing. Progressing 3/10/2020  STG: Patient will recall relevant verbal information with 80% accuracy with minimal assistance. Progressing 3/10/2020  STG: Patient will utilize memory compensatory strategies to improve recall of functional list/message with 90%accuracy given minimal assistance. Progressing 3/10/2020     Dysarthria Goals:  LTG: Patient will develop functional and intelligible speech and utilize compensatory strategies to improve communication across environments. STG: Patient will utilize compensatory strategies at conversation level with 90% accuracy independently.  Progressing 3/10/2020      SPEECH LANGUAGE PATHOLOGY: SPEECH-LANGUAGE/COGNITION: Daily Note 2    NAME/AGE/GENDER: Jaxon Islas is a 53 y.o. male  DATE: 3/19/2020  PRIMARY DIAGNOSIS: Acute ischemic stroke ([facility] [address] [I63.9]  Cerebrovascular accident (CVA) due to embolism ([facility] [address] [I63.9]      ICD-10: Treatment Diagnosis: R47.1 Dysarthria and Anarthria  R41.841 Cognitive-Communication Deficit    RECOMMENDATIONS   TREATMENT RECOMMENDATIONS:    Continue to follow for cognitive linguistic and dysarthria treatment     STRATEGIES:    Speech strategies: slow rate/over articulate to improve articulatory precision   Memory: repeat and write down novel/relevant information     EDUCATION:  · Recommendations discussed with Patient   Continuation of Skilled Services/Medical Necessity:   Patient is expected to demonstrate progress in expressive communication and cognitive ability to decrease assistance required communication, increase independence with activities of daily living and increase communication with family/caregivers.  Patient continues to require skilled intervention due to cognitive linguistic deficits and ongoing dysarthria      RECOMMENDATIONS for CONTINUED SPEECH THERAPY: YES: Anticipate need for ongoing speech therapy during this hospitalization and at next level of care. ASSESSMENT   Patient with improved performance with use of memory compensatory strategy (writing it down) with basic messages consisting of 3-4 key details (i.e. who, where, when, what). Patient writing down key information, rather than every word. Improved request for repetition, if needed. Decreased generative naming requiring verbal cues for subcategories to improve thought organization. Recommend: Ongoing speech therapy targeting speech intelligibility and cognitive linguistic function during this hospitalization and at next level of care. COMPLIANCE WITH PROGRAM/EXERCISES: Compliant most of the time  REHABILITATION POTENTIAL FOR STATED GOALS: Fair  PLAN    FREQUENCY/DURATION: Continue to follow patient 2 times a week for duration of hospital stay to address above goals. - Recommendations for next treatment session: Next treatment will address dysarthria and cognitive linguistic deficits     SUBJECTIVE   Upright in chair. Asking how long the no visitor policy will last.     Orientation:   Person  Place  Time  Situation     Pain: Pain Scale 1: Numeric (0 - 10)  Pain Intensity 1: 0    OBJECTIVE   Functional memory tasks with 3-4 key components-  ~3/4 across 5 trials with using writing as compensatory strategy. Functional generative naming/grocery list for specific meal (2 min)-  Category 1: 8 items independently, 10 with subcategory prompt  Category 2:  5 items independently, 10 with subcategory prompts          Tool Used: MODIFIED MARTY SCALE (mRS)   Score   No Symptoms  [] 0   No significant disability despite symptoms; able to carry out all usual duties and activities  [] 1   Slight disability; unable to carry out all previous activities but able to look after own affairs without assistance. [] 2   Moderate disability; requiring some help but able to walk without assistance  [x] 3   Moderately severe disability; unable to walk without assistance and unable to attend to own bodily needs without assistance  [] 4   Severe disability; bedridden, incontinent, and requiring constant nursing care and attention  [] 5      Score:  Initial: 3 Current: 3   Interpretation of Tool: The Modified Marty Scale is a 7-point scaled used to quantify level of disability as it relates to a patient's functional abilities. INTERDISCIPLINARY COLLABORATION: Registered Nurse  PRECAUTIONS/ALLERGIES: Patient has no known allergies.      SAFETY:  After treatment position/precautions:  · Up in chair  · Call light within reach  · CNA notified    Total Treatment Duration:     Time In: 8766  Time Out: [address] [address] [facility]

## 2020-03-19 NOTE — PROGRESS NOTES
Problem: Self Care Deficits Care Plan (Adult)  Goal: *Acute Goals and Plan of Care (Insert Text)  Description  GOALS UPDATED : 3/3/2020   (Met, 3/3/2020)  2. Patient will demonstrate appropriate safety awareness and protection of L UE during bed mobility and functional transfers with minimal cues. (Progressing, still needs cues, 3/3/2020)  3. Patient will complete total body bathing and dressing with moderate assistance and adaptive equipment as needed. (Progressing, UB bathing Min A, UB dressing at Mod A, LB dressing at Dep, 3/3/2020)  4. Patient will complete weightbearing into the L UE with ADL tasks with minimal assistance to improve ability to use as a functional assist during ADL tasks. (Progressing,3/3/2020)5. Patient will demonstrate L UE SROM HEP within 7 days. Progressing 3/3/2020  8. (Goal Met)  9. Pt will complete bed mobility at mod I. New goal  10. Pt will complete dynamic sitting balance for ADLs at mod I with good balance. New goal  11. Pt will complete functional transfers at [facility] to [place] [address] with equipment as needed. New goal  12. Pt will complete grooming tasks in standing at sink level x5 mins with good balance and equipment as needed    Goals per Re-evaluation on 3/18/2020:   1. Patient will demonstrate appropriate safety awareness and protection of L UE during bed mobility and functional transfers with minimal cues. (Progressing, still needs cues, 3/18/2020)  2. Patient will complete total body bathing and dressing with Min A and adaptive equipment as needed. (Progressing, UB bathing Min A, UB dressing at [address] [place] [address] , LB dressing at [facility], 3/18/2020)  3. Patient will complete weightbearing into the L UE with ADL tasks with minimal assistance to improve ability to use as a functional assist during ADL tasks. (Progressing, 3/18/2020)4. Patient will demonstrate L UE SROM HEP within 7 days. (Progressing, 3/18/2020)  5.  Pt will complete bed mobility with Mod I and minimal verbal cueing (Progressing, SBA 3/18/2020). 6. Pt will complete dynamic sitting balance for ADLs at mod I with good balance (Progressing, 3/18/2020)  7. Pt will complete functional transfers with Supervision with equipment as needed. (Progressing, SBA 3/18/2020)  8. Pt will complete grooming tasks in standing at sink level x5 mins with good balance and equipment as needed (Progressing, CGA with fair balance 3/18/2020)  9. Pt will complete grooming standing at sink level with SBA and use of adaptive equipment as needed. 10. Pt will don/doff UE sling with SBA and use of minimal verbal and visual cueing for correct application. Outcome: Progressing Towards Goal     OCCUPATIONAL THERAPY: Daily Note    3/19/2020  INPATIENT: OT Visit Days: 2  Payor: MEDICAID PENDING / Plan: [employer] PENDING / Product Type: Medicaid /      NAME/AGE/GENDER: Alanna Boss is a 53 y.o. male   PRIMARY DIAGNOSIS:  Acute ischemic stroke ([address] [I63.9]  Cerebrovascular accident (CVA) due to embolism ([address] [I61.9] Acute embolic stroke ([address] Acute embolic stroke ([address]       ICD-10: Treatment Diagnosis:    · Generalized Muscle Weakness (M62.81)  · Other lack of cordination (R27.8)  · Hemiplegia and hemiparesis following cerebral infarction affecting   · left non-dominant side (I69.354)  · Abnormal posture (R29.3)   Precautions/Allergies:    NO PULLING ON LUE   LUE in sling with shoulder joint approximated and supported, needs taping   Patient has no known allergies. ASSESSMENT:     Mr. Kyung Cisse presents to the hospital with L sided weakness and acute ischemic CVA. MRI revealed acute to subacute L cerebellar and R paramedian yariel infarcts. This session, pt greeted seated in chair, pleasant and agreeable to OT session. Pt performed transfer from sit to stand with CGA and use of luke- walker. Fair static and dynamic standing balance.  Pt transferred to/ from bed to sink with Min A and use of luke-walker and brushed his teeth with cues for problem solving, adaptive technique, and to use L hand as functional technique. CGA to wash hands with cues to use soap. Seated in chair, pt completed AROM, SROM, table slides, and PROM as detailed below. Pt is progressing towards goals, continue POC. This section established at most recent assessment   PROBLEM LIST (Impairments causing functional limitations):  1. Decreased Strength  2. Decreased ADL/Functional Activities  3. Decreased Transfer Abilities  4. Decreased Ambulation Ability/Technique  5. Decreased Balance  6. Decreased Activity Tolerance  7. Increased Fatigue  8. Decreased Flexibility/Joint Mobility  9. Decreased Knowledge of Precautions  10. Decreased [place] with Home Exercise Program   INTERVENTIONS PLANNED: (Benefits and precautions of occupational therapy have been discussed with the patient.)  1. Activities of daily living training  2. Adaptive equipment training  3. Balance training  4. Clothing management  5. Cognitive training  6. Donning&doffing training  7. Keanu tech training  8. Neuromuscular re-eduation  9. Therapeutic activity  10. Therapeutic exercise     TREATMENT PLAN: Frequency/Duration: Follow patient 3 times per week to address above goals. Rehabilitation Potential For Stated Goals: Excellent     REHAB RECOMMENDATIONS (at time of discharge pending progress):    Placement: It is my opinion, based on this patient's performance to date, that Mr. Yvette Lindsey may benefit from intensive therapy at an [address] [place] [address] after discharge due to potential to make ongoing and sustainable functional improvement that is of practical value. Jean Pierre Guzman Pt functioning far below independent baseline, demonstrating good improvement and participation. Pt would likely benefit greatly and increase independence from inpatient rehab stay.    Equipment:    TBD               OCCUPATIONAL PROFILE AND HISTORY:   History of Present Injury/Illness (Reason for Referral):  See H&P  Past Medical History/Comorbidities: Mr. Patty Brown  has a past medical history of Acute ischemic stroke ([facility] [address] (12/12/2019), Acute pancreatitis (11/19/2014), Cerebrovascular accident (CVA) due to embolism ([address] (12/12/2019), Diabetes ([address] (2002), Diabetes ([address] Diabetes mellitus, and Hypertension. He also has no past medical history of Arthritis, Asthma, Autoimmune disease ([address] CAD (coronary artery disease), Cancer ([address] Chronic kidney disease, COPD, Dementia, Dementia ([address] Heart failure ([address] Ill-defined condition, Infectious disease, Liver disease, Other ill-defined conditions(799.89), Psychiatric disorder, PUD (peptic ulcer disease), Seizures ([address] or Sleep disorder. Mr. Patty Brown  has a past surgical history that includes hx hernia repair and hx orthopaedic. Social History/Living Environment:   Home Environment: Apartment  # Steps to Enter: 12  Rails to Enter: Yes  [employer] : Bilateral  One/Two Story Residence: One story  Living Alone: No  Support Systems: Spouse/Significant Other/Partner  Patient Expects to be Discharged to[de-identified] Unknown  Current DME Used/Available at Home: None  Tub or Shower Type: Tub/Shower combination  Prior Level of Function/Work/Activity:  Pt lives at home with his wife. Pt is typically independent with ADL/functional mobility. Pt does not drive. Pt was working part-time at [employer]. Personal Factors:          Age:  53 y.o. Past/Current Experience:  CVA with flaccid L side        Other factors that influence how disability is experienced by the patient:  multiple co-morbidities    Number of Personal Factors/Comorbidities that affect the Plan of Care: Extensive review of physical, cognitive, and psychosocial performance (3+):  HIGH COMPLEXITY   ASSESSMENT OF OCCUPATIONAL PERFORMANCE[de-identified]   Activities of Daily Living:   Basic ADLs (From Assessment) Complex ADLs (From Assessment)   Feeding: Setup  Oral Facial Hygiene/Grooming: Minimum assistance  Bathing:  Moderate assistance  Upper Body Dressing: Minimum assistance  Lower Body Dressing: Maximum assistance  Toileting: Moderate assistance Instrumental ADL  Meal Preparation: Total assistance  Homemaking: Total assistance  Medication Management: Total assistance  Financial Management: Total assistance   Grooming/Bathing/Dressing Activities of Daily Living   Grooming  Grooming Assistance: Contact guard assistance;Minimum assistance     Upper Body Bathing  Bathing Assistance: Min A   Position Performed: Seated in chair                  Lower Body Dressing Assistance  Socks: Total assistance (dependent) Bed/Mat Mobility  Sit to Stand: Contact guard assistance  Stand to Sit: Contact guard assistance     Most Recent Physical Functioning:   Gross Assessment:                  Posture:     Balance:    Bed Mobility:     Wheelchair Mobility:     Transfers:  Sit to Stand: Contact guard assistance  Stand to Sit: Contact guard assistance            Patient Vitals for the past 6 hrs:   BP BP Patient Position SpO2 Pulse   03/19/20 0800 122/79 At rest 100 % 67   03/19/20 1200 125/86 At rest 99 % 71       Mental Status  Neurologic State: Alert  Orientation Level: Oriented X4  Cognition: Follows commands  Perception: Verbal, Cues to attend to left side of body, Cues to attend left visual field, Visual, Cues to maintain midline in sitting, Cues to maintain midline in standing, Tactile  Perseveration: No perseveration noted  Safety/Judgement: Fall prevention, Decreased awareness of need for assistance, Decreased awareness of need for safety, Decreased awareness of environment, Decreased insight into deficits                          Physical Skills Involved:  1. Range of Motion  2. Balance  3. Strength  4. Activity Tolerance  5. Fine Motor Control  6. Gross Motor Control Cognitive Skills Affected (resulting in the inability to perform in a timely and safe manner):  1. Perception  2. Expression Psychosocial Skills Affected:  1. Habits/Routines  2. Environmental Adaptation  3.  Social Interaction  4. Emotional Regulation  5. Self-Awareness  6. Awareness of Others  7. Social Roles   Number of elements that affect the Plan of Care: 5+:  HIGH COMPLEXITY   CLINICAL DECISION MAKIN[address] [place] [address] AM-PAC 6 Clicks   Daily Activity Inpatient Short Form  How much help from another person does the patient currently need. .. Total A Lot A Little None   1. Putting on and taking off regular lower body clothing? [] 1   [x] 2   [] 3   [] 4   2. Bathing (including washing, rinsing, drying)? [] 1   [x] 2   [] 3   [] 4   3. Toileting, which includes using toilet, bedpan or urinal?   [] 1   [x] 2   [] 3   [] 4   4. Putting on and taking off regular upper body clothing? [] 1   [] 2   [x] 3   [] 4   5. Taking care of personal grooming such as brushing teeth? [] 1   [] 2   [x] 3   [] 4   6. Eating meals? [] 1   [] 2   [x] 3   [] 4   © , Trustees of [address] [place] [address], under license to [employer]. All rights reserved      Score:  Initial: 11 Most Recent: 15 (Date: 3/18/2020 )    Interpretation of Tool:  Represents activities that are increasingly more difficult (i.e. Bed mobility, Transfers, Gait). Medical Necessity:     · Patient demonstrates   · good and excellent  ·  rehab potential due to higher previous functional level. Reason for Services/Other Comments:  · Patient continues to require skilled intervention due to   · Decreased independence with ADL/functional transfers that impacts overall quality of life. · .   Use of outcome tool(s) and clinical judgement create a POC that gives a: MODERATE COMPLEXITY         TREATMENT:   (In addition to Assessment/Re-Assessment sessions the following treatments were rendered)     Pre-treatment Symptoms/Complaints:  \"I know I need to be wearing it. \" In reference to UE sling.   Pain: Initial:   Pain Intensity 1: 0  Post Session: Unchanged     Self Care: (15 minutes): Procedure(s) utilized to improve and/or restore self-care/home management as related to dressing and grooming. Required moderate visual, verbal, manual and   cueing to facilitate activities of daily living skills and compensatory activities. Therapeutic Exercise: (  9 min):  Exercises per grid below to improve mobility, strength and coordination. Required moderate verbal cues to promote proper body alignment, promote proper body posture and promote proper body mechanics. Progressed range, repetitions and complexity of movement as indicated. Date:  3/19/20 Date:   Date:     Activity/Exercise Parameters Parameters Parameters   L hand flexion at all joints AROM 20/1     L wrist flexion/ extension AROM 20/1     L elbow flexion SROM and table slides 20/1     L pronation/ supination 20/1     L shoulder flexion SROM/ AAROM 20/1                         Braces/Orthotics/Lines/Etc:   · O2 device: Room air  · Treatment/Session Assessment:    · Response to Treatment:  Pt making good progress towards goals and is a great participant. · Interdisciplinary Collaboration:   o Occupational Therapist  o Registered Nurse  o Certified Nursing Assistant/Patient Care Technician  · After treatment position/precautions:   o Up in chair  o Bed/Chair-wheels locked  o Bed in low position  o Call light within reach  o RN notified  o [place] Alarm in Place   · Compliance with Program/Exercises: Compliant all of the time, Will assess as treatment progresses. · Recommendations/Intent for next treatment session: \"Next visit will focus on advancements to more challenging activities and reduction in assistance provided\".   Total Treatment Duration:   OT Patient Time In/Time Out  Time In: 1004  Time Out: 20 [facility]

## 2020-03-19 NOTE — PROGRESS NOTES
Progress Note    3/19/2020  Admit Date: 2019  9:07 AM   NAME: Chel Wood   :  1975   MRN:  [id number]   Attending: Duc Hayden MD  PCP:  Fransisco Ames MD  Treatment Team: Attending Provider: Duc Hayden MD; Care Manager: Marlee Bonilla, RN; Care Manager: Dwight Yanez, [facility] [place]; Utilization Review: Bala Donnelly RN; Nurse Practitioner: Vee Montes NP; Nurse Practitioner: Hua Mallory NP; Primary Nurse: Fatuma Ponce RN; Charge Nurse: Silvana Colvin Speech Language Pathologist: Waldemar Blackman; Physical Therapy Assistant: Yudy Simon; Occupational Therapist: Sammie Alvarado OT    Full Code   SUBJECTIVE:   Mr. Alondra Altamirano is a 52 yo male with PMH of DM, HTN who presented with c/o left sided weakness and left facial droop 19.   CT head showed age indeterminate infarctions within the left corona radiata/caudate.  CTA head/neck showed stenosis at the distal segment of the right vertebral artery and multifocal stenosis in the P2 segment of the left posterior cerebral artery. MRI brain showed acute to early subacute infarcts in the left cerebellar hemisphere, in the periventricular deep left frontoparietal white matter and likely additional areas of restricted diffusion in the right paramedian yariel.   Echo +PFO.  On statin, ASA.  Has been difficult to place in STR. Plans for 4 week event monitor on DC and if no arrhythmia PFO closure recommended. Today moving right hand and forearm, still unable to squeeze my fingers. Denies c/o CP, SOB.      Past Medical History:   Diagnosis Date    Acute ischemic stroke ([address] 2019    Acute pancreatitis 2014    Cerebrovascular accident (CVA) due to embolism ([address] 2019    Diabetes ([address] 2002    Diabetes ([address]     Diabetes mellitus     Hypertension      Recent Results (from the past 24 hour(s))   CBC WITH AUTOMATED DIFF    Collection Time: 20  4:54 AM   Result Value Ref Range    WBC 4.7 4.3 - 11.1 K/uL    RBC 4.50 4.23 - 5.6 M/uL    HGB 11.8 (L) 13.6 - 17.2 g/dL    HCT 37.0 (L) 41.1 - 50.3 %    MCV 82.2 79.6 - 97.8 FL    MCH 26.2 26.1 - 32.9 PG    MCHC 31.9 31.4 - 35.0 g/dL    RDW 17.3 (H) 11.9 - 14.6 %    PLATELET 017 323 - 550 K/uL    MPV 11.5 9.4 - 12.3 FL    ABSOLUTE NRBC 0.00 0.0 - 0.2 K/uL    DF AUTOMATED      NEUTROPHILS 39 (L) 43 - 78 %    LYMPHOCYTES 51 (H) 13 - 44 %    MONOCYTES 7 4.0 - 12.0 %    EOSINOPHILS 1 0.5 - 7.8 %    BASOPHILS 1 0.0 - 2.0 %    IMMATURE GRANULOCYTES 0 0.0 - 5.0 %    ABS. NEUTROPHILS 1.9 1.7 - 8.2 K/UL    ABS. LYMPHOCYTES 2.4 0.5 - 4.6 K/UL    ABS. MONOCYTES 0.3 0.1 - 1.3 K/UL    ABS. EOSINOPHILS 0.1 0.0 - 0.8 K/UL    ABS. BASOPHILS 0.0 0.0 - 0.2 K/UL    ABS. IMM. GRANS. 0.0 0.0 - 0.5 K/UL   METABOLIC PANEL, COMPREHENSIVE    Collection Time: 03/19/20  4:54 AM   Result Value Ref Range    Sodium 140 136 - 145 mmol/L    Potassium 4.3 3.5 - 5.1 mmol/L    Chloride 108 (H) 98 - 107 mmol/L    CO2 26 21 - 32 mmol/L    Anion gap 6 (L) 7 - 16 mmol/L    Glucose 134 (H) 65 - 100 mg/dL    BUN 20 6 - 23 MG/DL    Creatinine 1.19 0.8 - 1.5 MG/DL    GFR est AA >60 >60 ml/min/1.73m2    GFR est non-AA >60 >60 ml/min/1.73m2    Calcium 9.8 8.3 - 10.4 MG/DL    Bilirubin, total 0.4 0.2 - 1.1 MG/DL    ALT (SGPT) 23 12 - 65 U/L    AST (SGOT) 17 15 - 37 U/L    Alk.  phosphatase 72 50 - 136 U/L    Protein, total 6.5 6.3 - 8.2 g/dL    Albumin 2.7 (L) 3.5 - 5.0 g/dL    Globulin 3.8 (H) 2.3 - 3.5 g/dL    A-G Ratio 0.7 (L) 1.2 - 3.5     GLUCOSE, POC    Collection Time: 03/19/20  6:40 AM   Result Value Ref Range    Glucose (POC) 127 (H) 65 - 100 mg/dL     No Known Allergies  Current Facility-Administered Medications   Medication Dose Route Frequency Provider Last Rate Last Dose    magnesium oxide (MAG-OX) tablet 400 mg  400 mg Oral BID Philly Cosme DO   400 mg at 03/19/20 0849    acetaminophen (TYLENOL) tablet 650 mg  650 mg Oral Q4H PRN Syl Bermudez MD        diphenhydrAMINE (BENADRYL) capsule 25 mg  25 mg Oral Q6H PRN Mandi Leon MD   25 mg at 03/18/20 1658    acetaminophen (TYLENOL) tablet 650 mg  650 mg Oral Q8H Mandi Leon MD   650 mg at 03/19/20 0515    lip protectant (BLISTEX) ointment 1 Each  1 Each Topical PRN Lee Grewal MD        metoprolol succinate (TOPROL-XL) XL tablet 25 mg  25 mg Oral DAILY Devan Soria MD   25 mg at 03/19/20 0849    polyethylene glycol (MIRALAX) packet 17 g  17 g Oral DAILY PRN Ash Armijo C, DO   17 g at 02/23/20 1708    guaiFENesin (ROBITUSSIN) 100 mg/5 mL oral liquid 100 mg  100 mg Oral Q4H PRN Devan Soria MD   100 mg at 03/18/20 1658    hydrALAZINE (APRESOLINE) 20 mg/mL injection 20 mg  20 mg IntraVENous Q4H PRN Emmie Oliveira MD   20 mg at 12/23/19 0133    atorvastatin (LIPITOR) tablet 80 mg  80 mg Oral QHS Emmie Oliveira MD   80 mg at 03/18/20 2250    lisinopril (PRINIVIL, ZESTRIL) tablet 40 mg  40 mg Oral DAILY Emmie Oliveira MD   40 mg at 03/19/20 0849    sodium chloride (NS) flush 5-40 mL  5-40 mL IntraVENous PRN Emmie Oliveira MD   10 mL at 03/18/20 0616    ondansetron (ZOFRAN) injection 4 mg  4 mg IntraVENous Q4H PRN Emmie Oliveira MD        aspirin chewable tablet 81 mg  81 mg Oral DAILY Emmie Oliveira MD   81 mg at 03/19/20 0849    enoxaparin (LOVENOX) injection 40 mg  40 mg SubCUTAneous Q24H Emmie Oliveira MD   40 mg at 03/18/20 1354    dextrose 40% (GLUTOSE) oral gel 1 Tube  15 g Oral PRN Emmie Oliveira MD        glucagon [facility]) injection 1 mg  1 mg IntraMUSCular PRN Emmie Oliveira MD        dextrose (D50W) injection syrg 12.5-25 g  25-50 mL IntraVENous PRN Emmie Oliveira MD           Review of Systems negative with exception of pertinent positives noted above  PHYSICAL EXAM     Visit Vitals  /79 (BP 1 Location: Right arm, BP Patient Position: At rest)   Pulse 67   Temp 97.8 °F (36.6 °C)   Resp 16   Ht 5' 6\" (1.676 m)   Wt 95.3 kg (210 lb)   SpO2 100%   BMI 33.89 kg/m²      Temp (24hrs), Av.1 °F (36.7 °C), Min:97.7 °F (36.5 °C), Max:98.8 °F (37.1 °C)    Oxygen Therapy  O2 Sat (%): 100 % (20 0800)  Pulse via Oximetry: 66 beats per minute (20 0000)  O2 Device: Room air (20 0000)    Intake/Output Summary (Last 24 hours) at 3/19/2020 0904  Last data filed at 3/19/2020 0844  Gross per 24 hour   Intake 1080 ml   Output    Net 1080 ml      General:       No acute distress    Lungs:          CTA bilaterally. Resp even and nonlabored  Heart:            S1S2 present without murmurs rubs gallops. RRR. No LE edema  Abdomen:    Soft, non tender, non distended. BS present  Extremities: No cyanosis. Left sided hemiparesis  Neurologic:  moves right hand and raises arm at elbow moderately, unable to squeeze my fingers left hand, mildly slurred speech. PERRLA, strength 4/5 RUE/RLE. Results summary of Diagnostic Studies/Procedures copied from within [facility] EMR:        [address] Problems    Diagnosis Date Noted    Hypomagnesemia 2020    PFO (patent foramen ovale) 2019    Arrhythmia 12/15/2019    Hyperlipemia     Acute [facility] ([facility]     Type 2 diabetes mellitus ([facility]     Hypertension      Plan:  Acute embolic stroke  MRI brain showed acute to early subacute infarcts in the left cerebellar hemisphere, in the periventricular deep left frontoparietal white matter and likely additional areas of restricted diffusion in the right paramedian yariel. +PFO on echo  On ASA, statin  Will need 4 week event monitor on DC, if no arrhythmia then will need PFO closure      Hypomagnesemia  Received IV, and on oral supplementation.      Hypertension  Continue metoprolol and ACE inhibitor  Goal BP <130/80     Type 2 diabetes mellitus:    Monitor, BGL controlled         Medically stable for DC.   Awaiting Medicaid approval.  May take months for approval.       Notes, labs, VS, diagnostic testing reviewed  Time spent with pt 35 min        DVT Prophylaxis: lovenox  Plan of Care Discussed with: Supervising MD  Dr. Mary Velasco, care team, pt        Carissa Cash, MARCELO         Addendum  Due to no visitor policy attempted to call wife to update plan of care and no answer. Pt does state he has been in contact with his wife.

## 2020-03-19 NOTE — PROGRESS NOTES
Problem: Patient Education: Go to Patient Education Activity  Goal: Patient/Family Education  Outcome: Progressing Towards Goal     Problem: Pressure Injury - Risk of  Goal: *Prevention of pressure injury  Description: Document Dewey Scale and appropriate interventions in the flowsheet. Outcome: Progressing Towards Goal  Note: Pressure Injury Interventions:  Sensory Interventions: Assess changes in LOC    Moisture Interventions: Absorbent underpads, Apply protective barrier, creams and emollients, Check for incontinence Q2 hours and as needed, Limit adult briefs, Maintain skin hydration (lotion/cream), Minimize layers, Moisture barrier, Offer toileting Q_hr    Activity Interventions: Increase time out of bed, Pressure redistribution bed/mattress(bed type), PT/OT evaluation    Mobility Interventions: HOB 30 degrees or less, Pressure redistribution bed/mattress (bed type), PT/OT evaluation, Turn and reposition approx. every two hours(pillow and wedges)    Nutrition Interventions: Document food/fluid/supplement intake    Friction and Shear Interventions: HOB 30 degrees or less                Problem: Patient Education: Go to Patient Education Activity  Goal: Patient/Family Education  Outcome: Progressing Towards Goal     Problem: Falls - Risk of  Goal: *Absence of Falls  Description: Document Jeanne Fall Risk and appropriate interventions in the flowsheet.   Outcome: Progressing Towards Goal  Note: Fall Risk Interventions:  Mobility Interventions: Bed/chair exit alarm, Communicate number of staff needed for ambulation/transfer, Patient to call before getting OOB, Utilize walker, cane, or other assistive device    Mentation Interventions: Bed/chair exit alarm    Medication Interventions: Bed/chair exit alarm, Patient to call before getting OOB, Teach patient to arise slowly    Elimination Interventions: Bed/chair exit alarm, Call light in reach, Patient to call for help with toileting needs, Toilet paper/wipes in reach, Toileting schedule/hourly rounds    History of Falls Interventions: Bed/chair exit alarm, Door open when patient unattended         Problem: Patient Education: Go to Patient Education Activity  Goal: Patient/Family Education  Outcome: Progressing Towards Goal     Problem: Nutrition Deficit  Goal: *Optimize nutritional status  Outcome: Progressing Towards Goal     Problem: General Medical Care Plan  Goal: *Vital signs within specified parameters  Outcome: Progressing Towards Goal  Goal: *Labs within defined limits  Outcome: Progressing Towards Goal  Goal: *Absence of infection signs and symptoms  Description: Wash hand more often   Outcome: Progressing Towards Goal  Goal: *Optimal pain control at patient's stated goal  Outcome: Progressing Towards Goal  Goal: *Skin integrity maintained  Outcome: Progressing Towards Goal  Goal: *Fluid volume balance  Outcome: Progressing Towards Goal  Goal: *Optimize nutritional status  Outcome: Progressing Towards Goal  Goal: *Anxiety reduced or absent  Outcome: Progressing Towards Goal  Goal: *Progressive mobility and function (eg: ADL's)  Outcome: Progressing Towards Goal     Problem: Patient Education: Go to Patient Education Activity  Goal: Patient/Family Education  Outcome: Progressing Towards Goal     Problem: Patient Education: Go to Patient Education Activity  Goal: Patient/Family Education  Outcome: Progressing Towards Goal     Problem: Patient Education: Go to Patient Education Activity  Goal: Patient/Family Education  Outcome: Progressing Towards Goal     Problem: Ischemic Stroke: Discharge Outcomes  Goal: *Verbalizes anxiety and depression are reduced or absent  Outcome: Progressing Towards Goal  Goal: *Verbalize understanding of risk factor modification(Stroke Metric)  Outcome: Progressing Towards Goal  Goal: *Hemodynamically stable  Outcome: Progressing Towards Goal  Goal: *Absence of aspiration pneumonia  Outcome: Progressing Towards Goal  Goal: *Aware of needed dietary changes  Outcome: Progressing Towards Goal  Goal: *Verbalize understanding of prescribed medications including anti-coagulants, anti-lipid, and/or anti-platelets(Stroke Metric)  Outcome: Progressing Towards Goal  Goal: *Tolerating diet  Outcome: Progressing Towards Goal  Goal: *Aware of follow-up diagnostics related to anticoagulants  Outcome: Progressing Towards Goal  Goal: *Ability to perform ADLs and demonstrates progressive mobility and function  Outcome: Progressing Towards Goal  Goal: *Absence of DVT(Stroke Metric)  Outcome: Progressing Towards Goal  Goal: *Absence of aspiration  Outcome: Progressing Towards Goal  Goal: *Optimal pain control at patient's stated goal  Outcome: Progressing Towards Goal  Goal: *Home safety concerns addressed  Outcome: Progressing Towards Goal  Goal: *Describes available resources and support systems  Outcome: Progressing Towards Goal  Goal: *Verbalizes understanding of activation of EMS(911) for stroke symptoms(Stroke Metric)  Outcome: Progressing Towards Goal  Goal: *Understands and describes signs and symptoms to report to providers(Stroke Metric)  Outcome: Progressing Towards Goal  Goal: *Neurolgocially stable (absence of additional neurological deficits)  Outcome: Progressing Towards Goal  Goal: *Verbalizes importance of follow-up with primary care physician(Stroke Metric)  Outcome: Progressing Towards Goal  Goal: *Smoking cessation discussed,if applicable(Stroke Metric)  Outcome: Progressing Towards Goal  Goal: *Depression screening completed(Stroke Metric)  Outcome: Progressing Towards Goal     Problem: Pain  Goal: *Control of Pain  Outcome: Progressing Towards Goal     Problem: Patient Education: Go to Patient Education Activity  Goal: Patient/Family Education  Outcome: Progressing Towards Goal

## 2020-03-19 NOTE — PROGRESS NOTES
Problem: Mobility Impaired (Adult and Pediatric)  Goal: *Acute Goals and Plan of Care  Description  GOALS UPDATED ON RE-ASSESSMENT 3/9/2020 (advancements to goals in bold):  1. Patient will perform bed mobility with supervision and 0 verbal cues within 7 treatment days. 2. Patient will perform transfer bed to chair with SUPERVISION within 7 treatment days. 3. Patient will demonstrate fair+ dynamic balance throughout stance phase on L LE within 7 treatment days. 4. Patient will require CGA throughout swing phase on (to advance) L LE within 7 treatment days. 5. Patient demonstrate 3+/5 strength in L LE hip flexion, hip abduction, and hip adduction within 7 treatment days. 6. Patient will ambulate 150ft+ with CGA and least retrictive assistive device within 7 treatment days. 7. Patient will perform wheelchair mobility x75ft with SUPERVISION within 7 treatment days. PHYSICAL THERAPY: Daily Note and PM 3/19/2020  INPATIENT: PT Visit Days : 6  Payor: MEDICAID PENDING / Plan: [employer] PENDING / Product Type: Medicaid /       NAME/AGE/GENDER: Edilberto Dos Santos is a 53 y.o. male   PRIMARY DIAGNOSIS: Acute ischemic stroke ([address] [I63.9]  Cerebrovascular accident (CVA) due to embolism ([address] [P48.0] Acute embolic stroke ([address] Acute embolic stroke ([address]       ICD-10: Treatment Diagnosis:   · Difficulty in walking, Not elsewhere classified (R26.2)  · Hemiplegia and hemiparesis following cerebral infarction affecting left non-dominant side (N13.768)   Precaution/Allergies:  Patient has no known allergies. ASSESSMENT:     Mr. Romina Orlando is a 53year old admitted R MCA CVA. The patient is supine in bed upon contact and agreeable to PT treatment. The patient performed bed mobility with supervision and min verbal cues for technique. He demonstrated good sitting balance on the edge of the bed and performed transfers with CGA-SBA.   He stood from the bed requiring 2 attempts but demonstrating good safety awareness. He then ambulated 150' with the luke-walker and CGA (+w/c in tow) with a hemiplegic gait pattern requiring continued cues for advancement and positioning of the LLE to prevent hyper-extension and dragging of the foot. He took a seated rest break in the w/c and then stood and ambulated an additional 150'x2 with the same. He took an additional seated rest break and declined working on w/c mobility today. The patient stood from the w/c and performed SPT to the bed with CGA-SBA. He returned to supine with supervision and was able to perform bridging in bed for positioning with min A for positioning the LLE. The patient was positioned for comfort and safety to prevent contractures. Overall the patient is making good progress toward therapy goals as indicated by increased gait distances and decreased assistance levels. Will continue skilled PT treatment as patient is still below functional baseline. This section established at most recent assessment   PROBLEM LIST (Impairments causing functional limitations):  1. Decreased Strength  2. Decreased ADL/Functional Activities  3. Decreased Transfer Abilities  4. Decreased Ambulation Ability/Technique  5. Decreased Balance  6. Increased Pain  7. Decreased Activity Tolerance  8. Increased Fatigue  9. Decreased Flexibility/Joint Mobility   INTERVENTIONS PLANNED: (Benefits and precautions of physical therapy have been discussed with the patient.)  1. Balance Exercise  2. Bed Mobility  3. Family Education  4. Gait Training  5. Home Exercise Program (HEP)  6. Manual Therapy  7. Neuromuscular Re-education/Strengthening  8. Range of Motion (ROM)  9. Therapeutic Activites  10. Therapeutic Exercise/Strengthening  11. Transfer Training     TREATMENT PLAN: Frequency/Duration: 3 times a week for duration of hospital stay  Rehabilitation Potential For Stated Goals: Good     REHAB RECOMMENDATIONS (at time of discharge pending progress):    Placement:   It is my opinion, based on this patient's performance to date, that Mr. Papito Ellison may benefit from intensive therapy at an [address] [place] [address] after discharge due to a probable need for close medical supervision by a rehab physician, a probable need for multiple therapy disciplines and potential to make ongoing and sustainable functional improvement that is of practical value. .  Equipment:    TBD pending progress with therapy. HISTORY:   History of Present Injury/Illness (Reason for Referral):  Per H&P: \"Pt is a 54 y/o smoker with DM, HTN, who presented to ER with L leg and arm weakness, L facial droop, dysarthria. First noted L leg weakness late night 12/11 when he woke up to go to the bathroom. He was normal when he went to bed around 1030. Woke up this morning and had persistent weakness L leg and also now noted in L arm. EMS called. Noted to have slurred speech and L facial droop as well. Code S called in ER around 9am.  MRI with acute infarcts in L cerebellar hemisphere, deep frontoparietal white matter, and R paramedian yariel. Also noted old lacunar infarcts. No large vessel occlusion on CTA, but some stenosis noted. No hx afib, TIA, CVA. No CP, palpitations, SOB. \"  Past Medical History/Comorbidities:   Mr. Papito Ellison  has a past medical history of Acute ischemic stroke ([address] (12/12/2019), Acute pancreatitis (11/19/2014), Cerebrovascular accident (CVA) due to embolism ([address] (12/12/2019), Diabetes ([address] (2002), Diabetes ([address] Diabetes mellitus, and Hypertension. He also has no past medical history of Arthritis, Asthma, Autoimmune disease ([address] CAD (coronary artery disease), Cancer ([address] Chronic kidney disease, COPD, Dementia, Dementia ([address] Heart failure ([address] Ill-defined condition, Infectious disease, Liver disease, Other ill-defined conditions(799.89), Psychiatric disorder, PUD (peptic ulcer disease), Seizures ([address] or Sleep disorder.   Mr. Papito Ellison  has a past surgical history that includes hx hernia repair and hx orthopaedic. Social History/Living Environment:   Home Environment: Apartment  # Steps to Enter: 12  Rails to Enter: Yes  [employer] : Bilateral  One/Two Story Residence: One story  Living Alone: No  Support Systems: Spouse/Significant Other/Partner  Patient Expects to be Discharged to[de-identified] Unknown  Current DME Used/Available at Home: None  Tub or Shower Type: Tub/Shower combination  Prior Level of Function/Work/Activity:  Independent, lives with wife in 2nd story 1 level apartment. No recent falls. Number of Personal Factors/Comorbidities that affect the Plan of Care: 1-2: MODERATE COMPLEXITY   EXAMINATION:   Most Recent Physical Functioning:   Gross Assessment:                  Posture:     Balance:  Sitting: Intact  Sitting - Static: Good (unsupported)  Sitting - Dynamic: Good (unsupported)  Standing: Impaired  Standing - Static: Fair  Standing - Dynamic : Fair Bed Mobility:  Rolling: Modified independent  Supine to Sit: Supervision  Sit to Supine: Supervision  Scooting: Supervision  Interventions: Verbal cues  Wheelchair Mobility:     Transfers:  Sit to Stand: Contact guard assistance;Stand-by assistance  Stand to Sit: Contact guard assistance;Stand-by assistance  Interventions: Verbal cues  Gait:     Gait Abnormalities: Hemiplegic  Distance (ft): (150'x3)  Assistive Device: Walker luke;Gait belt(+w/c follow)  Ambulation - Level of Assistance: Contact guard assistance  Interventions: Verbal cues; Safety awareness training      Body Structures Involved:  1. Nerves  2. Voice/Speech  3. Bones  4. Joints  5. Muscles Body Functions Affected:  1. Mental  2. Sensory/Pain  3. Neuromusculoskeletal  4. Movement Related Activities and Participation Affected:  1. General Tasks and Demands  2. Mobility  3. Self Care  4.  Interpersonal Interactions and Relationships   Number of elements that affect the Plan of Care: 4+: HIGH COMPLEXITY   CLINICAL PRESENTATION:   Presentation: Evolving clinical presentation with changing clinical characteristics: MODERATE COMPLEXITY   CLINICAL DECISION MAKIN [facility] Mobility Inpatient Short Form  How much difficulty does the patient currently have. .. Unable A Lot A Little None   1. Turning over in bed (including adjusting bedclothes, sheets and blankets)? [] 1   [] 2   [] 3   [x] 4   2. Sitting down on and standing up from a chair with arms ( e.g., wheelchair, bedside commode, etc.)   [] 1   [] 2   [x] 3   [] 4   3. Moving from lying on back to sitting on the side of the bed? [] 1   [] 2   [x] 3   [] 4   How much help from another person does the patient currently need. .. Total A Lot A Little None   4. Moving to and from a bed to a chair (including a wheelchair)? [] 1   [] 2   [x] 3   [] 4   5. Need to walk in hospital room? [] 1   [] 2   [x] 3   [] 4   6. Climbing 3-5 steps with a railing? [] 1   [x] 2   [] 3   [] 4   © , Trustees of [address] [place] [address], under license to [employer]. All rights reserved      Score:  Initial: 13 Most Recent: 18 (Date:3/9/2020)    Interpretation of Tool:  Represents activities that are increasingly more difficult (i.e. Bed mobility, Transfers, Gait). Medical Necessity:     · Patient is expected to demonstrate progress in   · strength, range of motion, balance, coordination, and functional technique  ·  to   · increase independence with all mobility. · .  Reason for Services/Other Comments:  · Patient continues to require skilled intervention due to   · medical complications and mobility deficits which impact his level of function, safety, and independence as indicated above.    · .   Use of outcome tool(s) and clinical judgement create a POC that gives a: Questionable prediction of patient's progress: MODERATE COMPLEXITY        TREATMENT:      Pre-treatment Symptoms/Complaints:  none  Pain: Initial: 0/10 Post Session:  0/10     Therapeutic Activity: (    44 Minutes): Therapeutic activities including bed mobility training, transfer training, ambulation on level ground, instruction in sequencing with luke walker, scootiing, SPT training, and patient education  to improve mobility, strength and balance. Required moderate Verbal cues; Safety awareness training to promote static and dynamic balance in standing and promote coordination of bilateral, lower extremity(s). Braces/Orthotics/Lines/Etc:   · Sling to L UE throughout transfers and ambulation  Treatment/Session Assessment:    · Response to Treatment:  See above  · Interdisciplinary Collaboration:   o Physical Therapy Assistant  o Registered Nurse  · After treatment position/precautions:   o Supine in bed  o Bed/Chair-wheels locked  o Bed in low position  o Call light within reach  o RN notified   · Compliance with Program/Exercises: Compliant all of the time  · Recommendations/Intent for next treatment session: \"Next visit will focus on advancements to more challenging activities and reduction in assistance provided\".     Total Treatment Duration:  PT Patient Time In/Time Out  Time In: 7246  Time Out: TODD Gomez

## 2020-03-19 NOTE — PROGRESS NOTES
SPEECH PATHOLOGY NOTE:    Attempted to see patient, however currently working with another therapy. Will check back at later time/date as schedule permits.        Lisa Mueller, [address] CCC-SLP

## 2020-03-20 LAB — GLUCOSE BLD STRIP.AUTO-MCNC: 125 MG/DL (ref 65–100)

## 2020-03-20 PROCEDURE — 74011250637 HC RX REV CODE- 250/637: Performed by: INTERNAL MEDICINE

## 2020-03-20 PROCEDURE — 97164 PT RE-EVAL EST PLAN CARE: CPT

## 2020-03-20 PROCEDURE — 97530 THERAPEUTIC ACTIVITIES: CPT

## 2020-03-20 PROCEDURE — 74011250637 HC RX REV CODE- 250/637: Performed by: HOSPITALIST

## 2020-03-20 PROCEDURE — 74011250636 HC RX REV CODE- 250/636: Performed by: INTERNAL MEDICINE

## 2020-03-20 PROCEDURE — 65270000029 HC RM PRIVATE

## 2020-03-20 PROCEDURE — 82962 GLUCOSE BLOOD TEST: CPT

## 2020-03-20 RX ADMIN — ACETAMINOPHEN 650 MG: 325 TABLET, FILM COATED ORAL at 21:07

## 2020-03-20 RX ADMIN — Medication 400 MG: at 17:26

## 2020-03-20 RX ADMIN — ENOXAPARIN SODIUM 40 MG: 40 INJECTION SUBCUTANEOUS at 14:40

## 2020-03-20 RX ADMIN — GUAIFENESIN 100 MG: 100 SOLUTION ORAL at 17:26

## 2020-03-20 RX ADMIN — METOPROLOL SUCCINATE 25 MG: 25 TABLET, FILM COATED, EXTENDED RELEASE ORAL at 09:21

## 2020-03-20 RX ADMIN — ACETAMINOPHEN 650 MG: 325 TABLET, FILM COATED ORAL at 05:23

## 2020-03-20 RX ADMIN — ASPIRIN 81 MG 81 MG: 81 TABLET ORAL at 09:21

## 2020-03-20 RX ADMIN — LISINOPRIL 40 MG: 20 TABLET ORAL at 09:20

## 2020-03-20 RX ADMIN — ATORVASTATIN CALCIUM 80 MG: 80 TABLET, FILM COATED ORAL at 21:07

## 2020-03-20 RX ADMIN — ACETAMINOPHEN 650 MG: 325 TABLET, FILM COATED ORAL at 14:40

## 2020-03-20 RX ADMIN — DIPHENHYDRAMINE HYDROCHLORIDE 25 MG: 25 CAPSULE ORAL at 17:26

## 2020-03-20 RX ADMIN — Medication 400 MG: at 09:21

## 2020-03-20 NOTE — PROGRESS NOTES
Hospitalist Progress Note     Admit Date:  2019  9:07 AM   Name:  Emmanuel Lloyd   Age:  53 y.o.  :  1975   MRN:  [id number]   PCP:  Nick Schofield MD  Treatment Team: Attending Provider: Nell Mays MD; Care Manager: Luther Jacobs, RN; Care Manager: Zhane Hawley, LMSW; Utilization Review: Santi Hudson RN; Nurse Practitioner: Bernice Pérez NP; Nurse Practitioner: Araceli Daniels NP; Primary Nurse: Izzy Hammond RN; Charge Nurse: Jane Kyle; Physical Therapist: Arian Mosley, PT, DPT    Subjective:   HPI and or CC:  52 yo AA male with PMH of DM, HTN admitted  for CVA affecting numerous areas of the brain. He was placed on ASA and statin. He has remained alert and oriented x3. Some movement to right side but unable to grasp with right hand. He is currently waiting on placement and this has remained difficult due to waiting on Medicaid. 3/20:  He remains afebrile, no leukocytosis. Alert and oriented x3. Voices no complaints today. Objective:     Patient Vitals for the past 24 hrs:   Temp Pulse Resp BP SpO2   20 0800 98.2 °F (36.8 °C) 73 17 121/82 99 %   20 0400 98 °F (36.7 °C) 71 16 124/80 100 %   20 0000 98.2 °F (36.8 °C) 76 16 120/83 97 %   20 2000 98.7 °F (37.1 °C) 70 14 109/72 98 %   20 1600 98.4 °F (36.9 °C) 70 17 108/70 98 %   20 1200 97.4 °F (36.3 °C) 71 16 125/86 99 %     Oxygen Therapy  O2 Sat (%): 99 % (20 0800)  Pulse via Oximetry: 70 beats per minute (20)  O2 Device: Room air (20)    Intake/Output Summary (Last 24 hours) at 3/20/2020 1024  Last data filed at 3/20/2020 0755  Gross per 24 hour   Intake 590 ml   Output    Net 590 ml         REVIEW OF SYSTEMS: Comprehensive ROS performed and negative except as stated in HPI. Physical Examination:  General:       No acute distress    Lungs:          CTA bilaterally.  Resp even and nonlabored  Heart:            S1S2 present without murmurs rubs gallops. RRR. No LE edema  Abdomen:    Soft, non tender, non distended. BS present  Extremities: No cyanosis. Left sided hemiparesis  Neurologic:  moves right hand and raises arm at elbow moderately, unable to squeeze my fingers left hand, mildly slurred speech.  PERRLA, strength 4/5 RUE/RLE. Data Review:  I have reviewed all labs, meds, telemetry events, and studies from the last 24 hours.     Recent Results (from the past 24 hour(s))   GLUCOSE, POC    Collection Time: 03/20/20  7:09 AM   Result Value Ref Range    Glucose (POC) 125 (H) 65 - 100 mg/dL        All Micro Results     None          Current Meds:  Current Facility-Administered Medications   Medication Dose Route Frequency    magnesium oxide (MAG-OX) tablet 400 mg  400 mg Oral BID    acetaminophen (TYLENOL) tablet 650 mg  650 mg Oral Q4H PRN    diphenhydrAMINE (BENADRYL) capsule 25 mg  25 mg Oral Q6H PRN    acetaminophen (TYLENOL) tablet 650 mg  650 mg Oral Q8H    lip protectant (BLISTEX) ointment 1 Each  1 Each Topical PRN    metoprolol succinate (TOPROL-XL) XL tablet 25 mg  25 mg Oral DAILY    polyethylene glycol (MIRALAX) packet 17 g  17 g Oral DAILY PRN    guaiFENesin (ROBITUSSIN) 100 mg/5 mL oral liquid 100 mg  100 mg Oral Q4H PRN    hydrALAZINE (APRESOLINE) 20 mg/mL injection 20 mg  20 mg IntraVENous Q4H PRN    atorvastatin (LIPITOR) tablet 80 mg  80 mg Oral QHS    lisinopril (PRINIVIL, ZESTRIL) tablet 40 mg  40 mg Oral DAILY    sodium chloride (NS) flush 5-40 mL  5-40 mL IntraVENous PRN    ondansetron (ZOFRAN) injection 4 mg  4 mg IntraVENous Q4H PRN    aspirin chewable tablet 81 mg  81 mg Oral DAILY    enoxaparin (LOVENOX) injection 40 mg  40 mg SubCUTAneous Q24H    dextrose 40% (GLUTOSE) oral gel 1 Tube  15 g Oral PRN    glucagon (GLUCAGEN) injection 1 mg  1 mg IntraMUSCular PRN    dextrose (D50W) injection syrg 12.5-25 g  25-50 mL IntraVENous PRN       Diet:  DIET NUTRITIONAL SUPPLEMENTS  DIET DIABETIC CONSISTENT CARB    Other Studies (last 24 hours):  No results found. Assessment and Plan:     Hospital Problems as of 3/20/2020 Date Reviewed: 3/3/2017          Codes Class Noted - Resolved POA    Hypomagnesemia ICD-10-CM: E83.42  ICD-9-CM: 275.2  3/7/2020 - Present Unknown        PFO (patent foramen ovale) ICD-10-CM: Q21.1  ICD-9-CM: 745.5  12/17/2019 - Present Yes        Arrhythmia ICD-10-CM: I49.9  ICD-9-CM: 427.9  12/15/2019 - Present Yes        Hyperlipemia ICD-10-CM: E78.5  ICD-9-CM: 272.4  12/15/2019 - Present Yes        * (Principal) Acute embolic stroke ([facility] [id number]: I63.9  ICD-9-CM: 434.11  12/12/2019 - Present Yes        Hypertension (Chronic) ICD-10-CM: I10  ICD-9-CM: 401.9  Unknown - Present Yes        Type 2 diabetes mellitus (HCC) (Chronic) ICD-10-CM: E11.9  ICD-9-CM: 250.00  Unknown - Present Yes              A/P:    Acute embolic stroke  MRI brain showed acute to early subacute infarcts in the left cerebellar hemisphere, in the periventricular deep left frontoparietal white matter and likely additional areas of restricted diffusion in the right paramedian yariel.    +PFO on echo  On ASA, statin  Will need 4 week event monitor on DC, if no arrhythmia then will need PFO closure      Hypomagnesemia  Received IV, and on oral supplementation.      Hypertension  Continue metoprolol and ACE inhibitor  Goal BP <130/80     Type 2 diabetes mellitus:    Monitor, BGL controlled        Signed:  Susan Forrest NP

## 2020-03-20 NOTE — PROGRESS NOTES
Problem: Mobility Impaired (Adult and Pediatric)  Goal: *Acute Goals and Plan of Care  Description  GOALS UPDATED ON RE-ASSESSMENT 3/20/2020 (advancements to goals in bold):  1. Patient will perform bed mobility with supervision and 0 verbal cues within 7 treatment days. 2. Patient will perform transfer bed to chair with SUPERVISION within 7 treatment days. 3. Patient will demonstrate fair+ dynamic balance throughout stance phase on L LE within 7 treatment days. 4. Patient will require STAND BY ASSIST throughout swing phase on (to advance) L LE within 7 treatment days. 5. Patient demonstrate 3+/5 strength in L LE hip flexion, hip abduction, and hip adduction within 7 treatment days. 6. Patient will ambulate 200ft+ with STAND BY ASSIST and least retrictive assistive device within 7 treatment days. 7. Patient will perform wheelchair mobility x75ft with MODIFIED INDEPENDENCE within 7 treatment days. PHYSICAL THERAPY: Daily Note, Re-evaluation and PM 3/20/2020  INPATIENT: PT Visit Days : 1  Payor: MEDICAID PENDING / Plan: [employer] PENDING / Product Type: Medicaid /       NAME/AGE/GENDER: Ebenezer Lima is a 53 y.o. male   PRIMARY DIAGNOSIS: Acute ischemic stroke ([facility] [address] [I63.9]  Cerebrovascular accident (CVA) due to embolism ([address] [W52.7] Acute embolic stroke ([address] Acute embolic stroke ([facility] [address]       ICD-10: Treatment Diagnosis:   · Difficulty in walking, Not elsewhere classified (R26.2)  · Hemiplegia and hemiparesis following cerebral infarction affecting left non-dominant side (I51.902)   Precaution/Allergies:  Patient has no known allergies. ASSESSMENT:     Mr. Wendy Fischer is a 53year old admitted R MCA CVA. The patient is supine in bed upon contact and agreeable to PT re-evaluation and treatment. The patient performed bed mobility with contact guard assistance and min verbal cues for technique.  He demonstrated good sitting balance on the edge of the bed and performed transfers with CGA-SBA. He stood from the bed requiring 2 attempts but demonstrating good safety awareness. LLE grossly 2+/5 with improved control knee extension, 2-/5 dorsiflexion. Treatment initiated to include ambulation 200' with the luke-walker and CGA (+w/c in tow) with a hemiplegic gait pattern requiring continued cues for advancement and positioning of the LLE to prevent hyper-extension, L hip external rotation and dragging of the foot. He took a seated rest break in the w/c and then stood and ambulated an additional 200' with the same. He took an additional seated rest break and declined working on w/c mobility today. The patient stood from the w/c and performed SPT to the bed with CGA-SBA. He returned to supine with supervision and was able to perform bridging in bed for positioning with stand by assist for positioning the LLE. The patient was positioned for comfort and safety to prevent contractures. Goals have been updated to reflect patient's current physical functioning. Overall the patient is making good progress toward therapy goals as indicated by increased gait distances and decreased assistance levels. Will continue skilled PT treatment as patient is still below functional baseline. This section established at most recent assessment   PROBLEM LIST (Impairments causing functional limitations):  1. Decreased Strength  2. Decreased ADL/Functional Activities  3. Decreased Transfer Abilities  4. Decreased Ambulation Ability/Technique  5. Decreased Balance  6. Increased Pain  7. Decreased Activity Tolerance  8. Increased Fatigue  9. Decreased Flexibility/Joint Mobility   INTERVENTIONS PLANNED: (Benefits and precautions of physical therapy have been discussed with the patient.)  1. Balance Exercise  2. Bed Mobility  3. Family Education  4. Gait Training  5. Home Exercise Program (HEP)  6. Manual Therapy  7. Neuromuscular Re-education/Strengthening  8. Range of Motion (ROM)  9.  Therapeutic Activites  10. Therapeutic Exercise/Strengthening  11. Transfer Training     TREATMENT PLAN: Frequency/Duration: 3 times a week for duration of hospital stay  Rehabilitation Potential For Stated Goals: Good     REHAB RECOMMENDATIONS (at time of discharge pending progress):    Placement: It is my opinion, based on this patient's performance to date, that Mr. Clovis Jordan may benefit from intensive therapy at an [address] [place] [address] after discharge due to a probable need for close medical supervision by a rehab physician, a probable need for multiple therapy disciplines and potential to make ongoing and sustainable functional improvement that is of practical value. .  Equipment:    TBD pending progress with therapy. HISTORY:   History of Present Injury/Illness (Reason for Referral):  Per H&P: \"Pt is a 52 y/o smoker with DM, HTN, who presented to ER with L leg and arm weakness, L facial droop, dysarthria. First noted L leg weakness late night 12/11 when he woke up to go to the bathroom. He was normal when he went to bed around 1030. Woke up this morning and had persistent weakness L leg and also now noted in L arm. EMS called. Noted to have slurred speech and L facial droop as well. Code S called in ER around 9am.  MRI with acute infarcts in L cerebellar hemisphere, deep frontoparietal white matter, and R paramedian yariel. Also noted old lacunar infarcts. No large vessel occlusion on CTA, but some stenosis noted. No hx afib, TIA, CVA. No CP, palpitations, SOB. \"  Past Medical History/Comorbidities:   Mr. Clovis Jordan  has a past medical history of Acute ischemic stroke ([address] (12/12/2019), Acute pancreatitis (11/19/2014), Cerebrovascular accident (CVA) due to embolism ([address] (12/12/2019), Diabetes ([address] (2002), Diabetes ([address] Diabetes mellitus, and Hypertension.  He also has no past medical history of Arthritis, Asthma, Autoimmune disease ([address] CAD (coronary artery disease), Cancer ([address] Chronic kidney disease, COPD, Dementia, Dementia ([facility] Heart failure ([facility] [address] Ill-defined condition, Infectious disease, Liver disease, Other ill-defined conditions(799.89), Psychiatric disorder, PUD (peptic ulcer disease), Seizures ([facility] [address] or Sleep disorder. Mr. Ellis Nair  has a past surgical history that includes hx hernia repair and hx orthopaedic. Social History/Living Environment:   Home Environment: Apartment  # Steps to Enter: 12  Rails to Enter: Yes  [employer] : Bilateral  One/Two Story Residence: One story  Living Alone: No  Support Systems: Spouse/Significant Other/Partner  Patient Expects to be Discharged to[de-identified] Unknown  Current DME Used/Available at Home: None  Tub or Shower Type: Tub/Shower combination  Prior Level of Function/Work/Activity:  Independent, lives with wife in 2nd story 1 level apartment. No recent falls. Number of Personal Factors/Comorbidities that affect the Plan of Care: 1-2: MODERATE COMPLEXITY   EXAMINATION:   Most Recent Physical Functioning:   Gross Assessment:                  Posture:     Balance:  Sitting: Intact  Standing: Impaired  Standing - Static: Fair  Standing - Dynamic : Fair Bed Mobility:  Supine to Sit: Contact guard assistance  Sit to Supine: Supervision  Scooting: Supervision  Wheelchair Mobility:     Transfers:  Sit to Stand: Contact guard assistance  Stand to Sit: Contact guard assistance  Gait:     Base of Support: Center of gravity altered;Shift to right  Speed/Clotilde: Slow  Step Length: Left shortened  Gait Abnormalities: Hemiplegic  Distance (ft): 100 Feet (ft)(x4)  Assistive Device: Gait belt;Walker luke(+wheelchair follow)  Ambulation - Level of Assistance: Contact guard assistance  Interventions: Safety awareness training;Verbal cues      Body Structures Involved:  1. Nerves  2. Voice/Speech  3. Bones  4. Joints  5. Muscles Body Functions Affected:  1. Mental  2. Sensory/Pain  3. Neuromusculoskeletal  4.  Movement Related Activities and Participation Affected:  1. General Tasks and Demands  2. Mobility  3. Self Care  4. Interpersonal Interactions and Relationships   Number of elements that affect the Plan of Care: 4+: HIGH COMPLEXITY   CLINICAL PRESENTATION:   Presentation: Evolving clinical presentation with changing clinical characteristics: MODERATE COMPLEXITY   CLINICAL DECISION MAKIN [facility] Mobility Inpatient Short Form  How much difficulty does the patient currently have. .. Unable A Lot A Little None   1. Turning over in bed (including adjusting bedclothes, sheets and blankets)? [] 1   [] 2   [] 3   [x] 4   2. Sitting down on and standing up from a chair with arms ( e.g., wheelchair, bedside commode, etc.)   [] 1   [] 2   [x] 3   [] 4   3. Moving from lying on back to sitting on the side of the bed? [] 1   [] 2   [x] 3   [] 4   How much help from another person does the patient currently need. .. Total A Lot A Little None   4. Moving to and from a bed to a chair (including a wheelchair)? [] 1   [] 2   [x] 3   [] 4   5. Need to walk in hospital room? [] 1   [] 2   [x] 3   [] 4   6. Climbing 3-5 steps with a railing? [] 1   [x] 2   [] 3   [] 4   © , Trustees of [facility] [employer], under license to [employer]. All rights reserved      Score:  Initial: 13 Most Recent: 18 (Date:3/9/2020)    Interpretation of Tool:  Represents activities that are increasingly more difficult (i.e. Bed mobility, Transfers, Gait). Medical Necessity:     · Patient is expected to demonstrate progress in   · strength, range of motion, balance, coordination, and functional technique  ·  to   · increase independence with all mobility. · .  Reason for Services/Other Comments:  · Patient continues to require skilled intervention due to   · medical complications and mobility deficits which impact his level of function, safety, and independence as indicated above.    · .   Use of outcome tool(s) and clinical judgement create a POC that gives a: Questionable prediction of patient's progress: MODERATE COMPLEXITY        TREATMENT:      Pre-treatment Symptoms/Complaints:  none  Pain: Initial: 0/10 Post Session:  0/10     Therapeutic Activity: (    25 Minutes): Therapeutic activities including bed mobility training, transfer training, ambulation on level ground, instruction in sequencing with luke walker, scootiing, SPT training, and patient education  to improve mobility, strength and balance. Required moderate Safety awareness training;Verbal cues to promote static and dynamic balance in standing and promote coordination of bilateral, lower extremity(s). Braces/Orthotics/Lines/Etc:   · Arm support provided with therapist assist.  Treatment/Session Assessment:    · Response to Treatment:  See above  · Interdisciplinary Collaboration:   o Physical Therapist  o Registered Nurse  · After treatment position/precautions:   o Supine in bed  o Bed/Chair-wheels locked  o Bed in low position  o Call light within reach  o RN notified   · Compliance with Program/Exercises: Compliant all of the time  · Recommendations/Intent for next treatment session: \"Next visit will focus on advancements to more challenging activities and reduction in assistance provided\".     Total Treatment Duration:  PT Patient Time In/Time Out  Time In: 3279  Time Out: LORETTA Tapia, DPT

## 2020-03-21 LAB — GLUCOSE BLD STRIP.AUTO-MCNC: 132 MG/DL (ref 65–100)

## 2020-03-21 PROCEDURE — 74011250637 HC RX REV CODE- 250/637: Performed by: INTERNAL MEDICINE

## 2020-03-21 PROCEDURE — 97535 SELF CARE MNGMENT TRAINING: CPT

## 2020-03-21 PROCEDURE — 65270000029 HC RM PRIVATE

## 2020-03-21 PROCEDURE — 82962 GLUCOSE BLOOD TEST: CPT

## 2020-03-21 PROCEDURE — 74011250636 HC RX REV CODE- 250/636: Performed by: INTERNAL MEDICINE

## 2020-03-21 PROCEDURE — 74011250637 HC RX REV CODE- 250/637: Performed by: HOSPITALIST

## 2020-03-21 RX ADMIN — ACETAMINOPHEN 650 MG: 325 TABLET, FILM COATED ORAL at 05:46

## 2020-03-21 RX ADMIN — ASPIRIN 81 MG 81 MG: 81 TABLET ORAL at 08:28

## 2020-03-21 RX ADMIN — LISINOPRIL 40 MG: 20 TABLET ORAL at 08:28

## 2020-03-21 RX ADMIN — DIPHENHYDRAMINE HYDROCHLORIDE 25 MG: 25 CAPSULE ORAL at 17:39

## 2020-03-21 RX ADMIN — ACETAMINOPHEN 650 MG: 325 TABLET, FILM COATED ORAL at 13:38

## 2020-03-21 RX ADMIN — GUAIFENESIN 100 MG: 100 SOLUTION ORAL at 08:29

## 2020-03-21 RX ADMIN — Medication 400 MG: at 17:39

## 2020-03-21 RX ADMIN — ENOXAPARIN SODIUM 40 MG: 40 INJECTION SUBCUTANEOUS at 13:38

## 2020-03-21 RX ADMIN — Medication 400 MG: at 08:28

## 2020-03-21 RX ADMIN — ATORVASTATIN CALCIUM 80 MG: 80 TABLET, FILM COATED ORAL at 21:55

## 2020-03-21 RX ADMIN — METOPROLOL SUCCINATE 25 MG: 25 TABLET, FILM COATED, EXTENDED RELEASE ORAL at 08:28

## 2020-03-21 RX ADMIN — ACETAMINOPHEN 650 MG: 325 TABLET, FILM COATED ORAL at 21:55

## 2020-03-21 RX ADMIN — GUAIFENESIN 100 MG: 100 SOLUTION ORAL at 17:39

## 2020-03-21 NOTE — PROGRESS NOTES
Progress Note    3/21/2020  Admit Date: 2019  9:07 AM   NAME: Sandra Allen   :  1975   MRN:  [id number]   Attending: Dev Catalan MD  PCP:  Tyrese Tipton MD  Treatment Team: Attending Provider: Dev Catalan MD; Care Manager: Rebecca Valdez LMSW; Utilization Review: Chema Law RN; Nurse Practitioner: Perry Espinosa, NP; Nurse Practitioner: Sarah Fortune NP; Primary Nurse: Vickie Sher RN; Occupational Therapy Assistant: Erica Jose    Full Code   SUBJECTIVE:     Mr. Latrell Collins is a 52 yo male with PMH of DM, HTN who presented with c/o left sided weakness and left facial droop 19.   CT head showed age indeterminate infarctions within the left corona radiata/caudate.  CTA head/neck showed stenosis at the distal segment of the right vertebral artery and multifocal stenosis in the P2 segment of the left posterior cerebral artery. MRI brain showed acute to early subacute infarcts in the left cerebellar hemisphere, in the periventricular deep left frontoparietal white matter and likely additional areas of restricted diffusion in the right paramedian yariel.   Echo +PFO.  On statin, ASA.  Has been difficult to place in STR. Plans for 4 week event monitor on DC and if no arrhythmia PFO closure recommended. Pt remains stable today. Awaiting placement. Denies complaints.        Past Medical History:   Diagnosis Date    Acute ischemic stroke ([address] 2019    Acute pancreatitis 2014    Cerebrovascular accident (CVA) due to embolism ([address] 2019    Diabetes ([address] 2002    Diabetes ([address]     Diabetes mellitus     Hypertension      Recent Results (from the past 24 hour(s))   GLUCOSE, POC    Collection Time: 20  6:13 AM   Result Value Ref Range    Glucose (POC) 132 (H) 65 - 100 mg/dL     No Known Allergies  Current Facility-Administered Medications   Medication Dose Route Frequency Provider Last Rate Last Dose    magnesium oxide (MAG-OX) tablet 400 mg 400 mg Oral BID Logan Heather, DO   400 mg at 03/21/20 8164    acetaminophen (TYLENOL) tablet 650 mg  650 mg Oral Q4H PRN Amberly Sharif MD        diphenhydrAMINE (BENADRYL) capsule 25 mg  25 mg Oral Q6H PRN Melodie Valdovinos MD   25 mg at 03/20/20 1726    acetaminophen (TYLENOL) tablet 650 mg  650 mg Oral Laurent Hurd MD   650 mg at 03/21/20 0546    lip protectant (BLISTEX) ointment 1 Each  1 Each Topical PRN Ok Miranda MD        metoprolol succinate (TOPROL-XL) XL tablet 25 mg  25 mg Oral DAILY Amberly Sharif MD   25 mg at 03/21/20 5055    polyethylene glycol (MIRALAX) packet 17 g  17 g Oral DAILY PRN Volodymyr Lux C, DO   17 g at 02/23/20 1708    guaiFENesin (ROBITUSSIN) 100 mg/5 mL oral liquid 100 mg  100 mg Oral Q4H PRN Amberly Sharif MD   100 mg at 03/21/20 7078    hydrALAZINE (APRESOLINE) 20 mg/mL injection 20 mg  20 mg IntraVENous Q4H PRN Melo Britton MD   20 mg at 12/23/19 0133    atorvastatin (LIPITOR) tablet 80 mg  80 mg Oral QHS Melo Britton MD   80 mg at 03/20/20 2107    lisinopril (PRINIVIL, ZESTRIL) tablet 40 mg  40 mg Oral DAILY Melo Britton MD   40 mg at 03/21/20 9181    sodium chloride (NS) flush 5-40 mL  5-40 mL IntraVENous PRN Melo Britton MD   10 mL at 03/18/20 0616    ondansetron (ZOFRAN) injection 4 mg  4 mg IntraVENous Q4H PRN Melo Britton MD        aspirin chewable tablet 81 mg  81 mg Oral DAILY Melo Britton MD   81 mg at 03/21/20 4596    enoxaparin (LOVENOX) injection 40 mg  40 mg SubCUTAneous Q24H Melo Britton MD   40 mg at 03/20/20 1440    dextrose 40% (GLUTOSE) oral gel 1 Tube  15 g Oral PRN Melo Britton MD        glucagon [facility]) injection 1 mg  1 mg IntraMUSCular PRN Melo Britton MD        dextrose (D50W) injection syrg 12.5-25 g  25-50 mL IntraVENous PRN Melo Britton MD           Review of Systems negative with exception of pertinent positives noted above  PHYSICAL EXAM     Visit Vitals  /74 (BP 1 Location: Right arm, BP Patient Position: At rest)   Pulse 70   Temp 98.1 °F (36.7 °C)   Resp 19   Ht 5' 6\" (1.676 m)   Wt 95.3 kg (210 lb)   SpO2 100%   BMI 33.89 kg/m²      Temp (24hrs), Av.1 °F (36.7 °C), Min:97.8 °F (36.6 °C), Max:98.6 °F (37 °C)    Oxygen Therapy  O2 Sat (%): 100 % (20 0800)  Pulse via Oximetry: 70 beats per minute (20)  O2 Device: Room air (20)    Intake/Output Summary (Last 24 hours) at 3/21/2020 1004  Last data filed at 3/21/2020 0752  Gross per 24 hour   Intake 477 ml   Output    Net 477 ml        General:       No acute distress    Lungs:          CTA bilaterally. Resp even and nonlabored  Heart:            S1S2 present without murmurs rubs gallops. RRR. No LE edema  Abdomen:    Soft, non tender, non distended. BS present  Extremities: No cyanosis. Left sided hemiparesis  Neurologic:  moves right hand and raises arm at elbow moderately, unable to squeeze my fingers left hand, mildly slurred speech.  PERRLA, strength 4/5 RUE/RLE. Results summary of Diagnostic Studies/Procedures copied from within [facility] EMR:      [address] Problems    Diagnosis Date Noted    Hypomagnesemia 2020    PFO (patent foramen ovale) 2019    Arrhythmia 12/15/2019    Hyperlipemia     Acute embolic stroke ([facility]     Type 2 diabetes mellitus ([facility] [facility]     Hypertension      Plan:  Acute embolic stroke  MRI brain showed acute to early subacute infarcts in the left cerebellar hemisphere, in the periventricular deep left frontoparietal white matter and likely additional areas of restricted diffusion in the right paramedian yariel.    +PFO on echo  On ASA, statin  Will need 4 week event monitor on DC, if no arrhythmia then will need PFO closure      Hypomagnesemia  Received IV, and on oral supplementation.      Hypertension  Continue metoprolol and ACE inhibitor  Goal BP <130/80     Type 2 diabetes mellitus:    Monitor, BGL controlled         Medically stable for DC. Awaiting Medicaid approval.  May take months for approval.         Notes, labs, VS, diagnostic testing reviewed  Time spent with pt 35 min        DVT Prophylaxis: lovenox  Plan of Care Discussed with: Supervising MD Dr. Douglas Marques, care team, pt        Sona Chu, NP      Addendum  Given no visitor policy attempted to contact pt wife.  No answer

## 2020-03-21 NOTE — PROGRESS NOTES
Problem: Self Care Deficits Care Plan (Adult)  Goal: *Acute Goals and Plan of Care (Insert Text)  Description  GOALS UPDATED : 3/3/2020   (Met, 3/3/2020)  2. Patient will demonstrate appropriate safety awareness and protection of L UE during bed mobility and functional transfers with minimal cues. (Progressing, still needs cues, 3/3/2020)  3. Patient will complete total body bathing and dressing with moderate assistance and adaptive equipment as needed. (Progressing, UB bathing Min A, UB dressing at Mod A, LB dressing at [facility], 3/3/2020)  4. Patient will complete weightbearing into the L UE with ADL tasks with minimal assistance to improve ability to use as a functional assist during ADL tasks. (Progressing,3/3/2020)5. Patient will demonstrate L UE SROM HEP within 7 days. Progressing 3/3/2020  8. (Goal Met)  9. Pt will complete bed mobility at mod I. New goal  10. Pt will complete dynamic sitting balance for ADLs at mod I with good balance. New goal  11. Pt will complete functional transfers at [facility] 62 to [facility] 54 with equipment as needed. New goal  12. Pt will complete grooming tasks in standing at sink level x5 mins with good balance and equipment as needed    Goals per Re-evaluation on 3/18/2020:   1. Patient will demonstrate appropriate safety awareness and protection of L UE during bed mobility and functional transfers with minimal cues. (Progressing, still needs cues, 3/18/2020)  2. Patient will complete total body bathing and dressing with Min A and adaptive equipment as needed. (Progressing, UB bathing Min A, UB dressing at [address] [place] [address] , LB dressing at [facility], 3/18/2020)  3. Patient will complete weightbearing into the L UE with ADL tasks with minimal assistance to improve ability to use as a functional assist during ADL tasks. (Progressing, 3/18/2020)4. Patient will demonstrate L UE SROM HEP within 7 days. (Progressing, 3/18/2020)  5.  Pt will complete bed mobility with Mod I and minimal verbal cueing (Progressing, SBA 3/18/2020). 6. Pt will complete dynamic sitting balance for ADLs at mod I with good balance (Progressing, 3/18/2020)  7. Pt will complete functional transfers with Supervision with equipment as needed. (Progressing, SBA 3/18/2020)  8. Pt will complete grooming tasks in standing at sink level x5 mins with good balance and equipment as needed (Progressing, CGA with fair balance 3/18/2020)  9. Pt will complete grooming standing at sink level with SBA and use of adaptive equipment as needed. 10. Pt will don/doff UE sling with SBA and use of minimal verbal and visual cueing for correct application. Outcome: Progressing Towards Goal     OCCUPATIONAL THERAPY: Daily Note    3/21/2020  INPATIENT: OT Visit Days: 3  Payor: MEDICAID PENDING / Plan: [place] PENDING / Product Type: Medicaid /      NAME/AGE/GENDER: Hazel Waldron is a 53 y.o. male   PRIMARY DIAGNOSIS:  Acute ischemic stroke ([address] [I63.9]  Cerebrovascular accident (CVA) due to embolism ([address] [L68.9] Acute embolic stroke ([address] Acute embolic stroke ([address]       ICD-10: Treatment Diagnosis:    · Generalized Muscle Weakness (M62.81)  · Other lack of cordination (R27.8)  · Hemiplegia and hemiparesis following cerebral infarction affecting   · left non-dominant side (I69.354)  · Abnormal posture (R29.3)   Precautions/Allergies:    NO PULLING ON LUE   LUE in sling with shoulder joint approximated and supported, needs taping   Patient has no known allergies. ASSESSMENT:     Mr. Shanell Cueva presents to the hospital with L sided weakness and acute ischemic CVA. MRI revealed acute to subacute L cerebellar and R paramedian yariel infarcts. 3/21/2020 Pt was supine in bed upon arrival. Pt completed bed mobility with min A. Pt completed functional transfer with min A. Pt completed toileting, dressing and bathing with the assistance listed below. Pt required total A for LB dressing. Good progress made. Continue POC.     This section established at most recent assessment   PROBLEM LIST (Impairments causing functional limitations):  1. Decreased Strength  2. Decreased ADL/Functional Activities  3. Decreased Transfer Abilities  4. Decreased Ambulation Ability/Technique  5. Decreased Balance  6. Decreased Activity Tolerance  7. Increased Fatigue  8. Decreased Flexibility/Joint Mobility  9. Decreased Knowledge of Precautions  10. Decreased [place] with Home Exercise Program   INTERVENTIONS PLANNED: (Benefits and precautions of occupational therapy have been discussed with the patient.)  1. Activities of daily living training  2. Adaptive equipment training  3. Balance training  4. Clothing management  5. Cognitive training  6. Donning&doffing training  7. Keanu tech training  8. Neuromuscular re-eduation  9. Therapeutic activity  10. Therapeutic exercise     TREATMENT PLAN: Frequency/Duration: Follow patient 3 times per week to address above goals. Rehabilitation Potential For Stated Goals: Excellent     REHAB RECOMMENDATIONS (at time of discharge pending progress):    Placement: It is my opinion, based on this patient's performance to date, that Mr. Kyung Cisse may benefit from intensive therapy at an [address] [place] [address] after discharge due to potential to make ongoing and sustainable functional improvement that is of practical value. [facility] functioning far below independent baseline, demonstrating good improvement and participation. Pt would likely benefit greatly and increase independence from inpatient rehab stay. Equipment:    TBD               OCCUPATIONAL PROFILE AND HISTORY:   History of Present Injury/Illness (Reason for Referral):  See H&P  Past Medical History/Comorbidities:   Mr. Kyung Cisse  has a past medical history of Acute ischemic stroke ([address] (12/12/2019), Acute pancreatitis (11/19/2014), Cerebrovascular accident (CVA) due to embolism ([address] (12/12/2019), Diabetes ([address] (2002), Diabetes ([address] Diabetes mellitus, and Hypertension.  He also has no past medical history of Arthritis, Asthma, Autoimmune disease ([facility] CAD (coronary artery disease), Cancer ([facility] Chronic kidney disease, COPD, Dementia, Dementia ([facility] Heart failure ([facility] Ill-defined condition, Infectious disease, Liver disease, Other ill-defined conditions(799.89), Psychiatric disorder, PUD (peptic ulcer disease), Seizures ([facility] or Sleep disorder. Mr. Kathleen Jensen  has a past surgical history that includes hx hernia repair and hx orthopaedic. Social History/Living Environment:   Home Environment: Apartment  # Steps to Enter: 12  Rails to Enter: Yes  [employer] : Bilateral  One/Two Story Residence: One story  Living Alone: No  Support Systems: Spouse/Significant Other/Partner  Patient Expects to be Discharged to[de-identified] Unknown  Current DME Used/Available at Home: None  Tub or Shower Type: Tub/Shower combination  Prior Level of Function/Work/Activity:  Pt lives at home with his wife. Pt is typically independent with ADL/functional mobility. Pt does not drive. Pt was working part-time at [employer]. Personal Factors:          Age:  53 y.o. Past/Current Experience:  CVA with flaccid L side        Other factors that influence how disability is experienced by the patient:  multiple co-morbidities    Number of Personal Factors/Comorbidities that affect the Plan of Care: Extensive review of physical, cognitive, and psychosocial performance (3+):  HIGH COMPLEXITY   ASSESSMENT OF OCCUPATIONAL PERFORMANCE[de-identified]   Activities of Daily Living:   Basic ADLs (From Assessment) Complex ADLs (From Assessment)   Feeding: Setup  Oral Facial Hygiene/Grooming: Minimum assistance  Bathing: Moderate assistance  Upper Body Dressing: Minimum assistance  Lower Body Dressing: Maximum assistance  Toileting: Moderate assistance Instrumental ADL  Meal Preparation: Total assistance  Homemaking:  Total assistance  Medication Management: Total assistance  Financial Management: Total assistance   Grooming/Bathing/Dressing Activities of Daily Living   Grooming  Washing Face: Set-up Cognitive Retraining  Safety/Judgement: Fall prevention   Upper Body Bathing  Bathing Assistance: Min A   Position Performed: Seated in chair      Lower Body Bathing  Bathing Assistance: Minimum assistance  Perineal  : Minimum assistance  Lower Body : Minimum assistance     Upper Body [facility] Gown: Set-up Functional Transfers  Toilet Transfer : Minimum assistance   Lower Body Dressing Assistance  Socks: Total assistance (dependent) Bed/Mat Mobility  Supine to Sit: Minimum assistance  Sit to Supine: Minimum assistance  Bed to Chair: Minimum assistance     Most Recent Physical Functioning:   Gross Assessment:                  Posture:     Balance:  Sitting: Intact  Standing: With support Bed Mobility:  Supine to Sit: Minimum assistance  Sit to Supine: Minimum assistance  Wheelchair Mobility:     Transfers:  Bed to Chair: Minimum assistance            Patient Vitals for the past 6 hrs:   BP BP Patient Position SpO2 Pulse   20 0800 117/74 At rest 100 % 70   20 1200 105/73 At rest 99 % 79       Mental Status  Neurologic State: Alert  Orientation Level: Oriented to person  Cognition: Follows commands  Perception: Verbal, Tactile  Perseveration: Tactile cues provided, Verbal cues provided  Safety/Judgement: Fall prevention                          Physical Skills Involved:  1. Range of Motion  2. Balance  3. Strength  4. Activity Tolerance  5. Fine Motor Control  6. Gross Motor Control Cognitive Skills Affected (resulting in the inability to perform in a timely and safe manner):  1. Perception  2. Expression Psychosocial Skills Affected:  1. Habits/Routines  2. Environmental Adaptation  3. Social Interaction  4. Emotional Regulation  5. Self-Awareness  6. Awareness of Others  7.  Social Roles   Number of elements that affect the Plan of Care: 5+:  HIGH COMPLEXITY   CLINICAL DECISION MAKIN [facility] [address] [id number] AM-PAC 6 Clicks   Daily Activity Inpatient Short Form  How much help from another person does the patient currently need. .. Total A Lot A Little None   1. Putting on and taking off regular lower body clothing? [] 1   [x] 2   [] 3   [] 4   2. Bathing (including washing, rinsing, drying)? [] 1   [x] 2   [] 3   [] 4   3. Toileting, which includes using toilet, bedpan or urinal?   [] 1   [x] 2   [] 3   [] 4   4. Putting on and taking off regular upper body clothing? [] 1   [] 2   [x] 3   [] 4   5. Taking care of personal grooming such as brushing teeth? [] 1   [] 2   [x] 3   [] 4   6. Eating meals? [] 1   [] 2   [x] 3   [] 4   © 2007, Trustees of [address] [place] [address] [address] [address], under license to [employer]. All rights reserved      Score:  Initial: 11 Most Recent: 15 (Date: 3/18/2020 )    Interpretation of Tool:  Represents activities that are increasingly more difficult (i.e. Bed mobility, Transfers, Gait). Medical Necessity:     · Patient demonstrates   · good and excellent  ·  rehab potential due to higher previous functional level. Reason for Services/Other Comments:  · Patient continues to require skilled intervention due to   · Decreased independence with ADL/functional transfers that impacts overall quality of life. · .   Use of outcome tool(s) and clinical judgement create a POC that gives a: MODERATE COMPLEXITY         TREATMENT:   (In addition to Assessment/Re-Assessment sessions the following treatments were rendered)     Pre-treatment Symptoms/Complaints:  \"I know I need to be wearing it. \" In reference to UE sling. Pain: Initial:   Pain Intensity 1: 0  Post Session: Unchanged     Self Care: (23): Procedure(s) (per grid) utilized to improve and/or restore self-care/home management as related to dressing, bathing and toileting. Required min verbal and manual cueing to facilitate activities of daily living skills.    Date:  3/19/20 Date:   Date:     Activity/Exercise Parameters Parameters Parameters   L hand flexion at all joints AROM 20/1     L wrist flexion/ extension AROM 20/1     L elbow flexion SROM and table slides 20/1     L pronation/ supination 20/1     L shoulder flexion SROM/ AAROM 20/1                         Braces/Orthotics/Lines/Etc:   · O2 device: Room air  · Treatment/Session Assessment:    · Response to Treatment:  Pt making good progress towards goals and is a great participant. · Interdisciplinary Collaboration:   o Certified Occupational Therapy Assistant  o Registered Nurse  o Certified Nursing Assistant/Patient Care Technician  · After treatment position/precautions:   o Supine in bed  o Bed/Chair-wheels locked  o Bed in low position  o Call light within reach  o RN notified   · Compliance with Program/Exercises: Compliant all of the time, Will assess as treatment progresses. · Recommendations/Intent for next treatment session: \"Next visit will focus on advancements to more challenging activities and reduction in assistance provided\".   Total Treatment Duration:   OT Patient Time In/Time Out  Time In: 0950  Time Out: [address]

## 2020-03-22 LAB — GLUCOSE BLD STRIP.AUTO-MCNC: 123 MG/DL (ref 65–100)

## 2020-03-22 PROCEDURE — 74011250637 HC RX REV CODE- 250/637: Performed by: INTERNAL MEDICINE

## 2020-03-22 PROCEDURE — 74011250636 HC RX REV CODE- 250/636: Performed by: INTERNAL MEDICINE

## 2020-03-22 PROCEDURE — 82962 GLUCOSE BLOOD TEST: CPT

## 2020-03-22 PROCEDURE — 65270000029 HC RM PRIVATE

## 2020-03-22 PROCEDURE — 74011250637 HC RX REV CODE- 250/637: Performed by: HOSPITALIST

## 2020-03-22 RX ADMIN — DIPHENHYDRAMINE HYDROCHLORIDE 25 MG: 25 CAPSULE ORAL at 18:51

## 2020-03-22 RX ADMIN — LISINOPRIL 40 MG: 20 TABLET ORAL at 09:45

## 2020-03-22 RX ADMIN — ASPIRIN 81 MG 81 MG: 81 TABLET ORAL at 09:45

## 2020-03-22 RX ADMIN — Medication 400 MG: at 09:45

## 2020-03-22 RX ADMIN — ACETAMINOPHEN 650 MG: 325 TABLET, FILM COATED ORAL at 05:52

## 2020-03-22 RX ADMIN — ATORVASTATIN CALCIUM 80 MG: 80 TABLET, FILM COATED ORAL at 20:29

## 2020-03-22 RX ADMIN — ACETAMINOPHEN 650 MG: 325 TABLET, FILM COATED ORAL at 14:38

## 2020-03-22 RX ADMIN — METOPROLOL SUCCINATE 25 MG: 25 TABLET, FILM COATED, EXTENDED RELEASE ORAL at 09:45

## 2020-03-22 RX ADMIN — ENOXAPARIN SODIUM 40 MG: 40 INJECTION SUBCUTANEOUS at 14:38

## 2020-03-22 RX ADMIN — GUAIFENESIN 100 MG: 100 SOLUTION ORAL at 18:51

## 2020-03-22 RX ADMIN — Medication 400 MG: at 18:51

## 2020-03-22 RX ADMIN — ACETAMINOPHEN 650 MG: 325 TABLET, FILM COATED ORAL at 20:29

## 2020-03-22 NOTE — PROGRESS NOTES
Problem: Falls - Risk of  Goal: *Absence of Falls  Outcome: Progressing Towards Goal  Note: Fall Risk Interventions:  Mobility Interventions: Bed/chair exit alarm, Patient to call before getting OOB    Mentation Interventions: Adequate sleep, hydration, pain control, Bed/chair exit alarm    Medication Interventions: Bed/chair exit alarm, Patient to call before getting OOB, Teach patient to arise slowly    Elimination Interventions: Bed/chair exit alarm, Call light in reach, Patient to call for help with toileting needs    History of Falls Interventions: Bed/chair exit alarm

## 2020-03-22 NOTE — PROGRESS NOTES
[facility]. No needs voiced. No s/s of acute distress.

## 2020-03-22 NOTE — PROGRESS NOTES
Progress Note    3/22/2020  Admit Date: 2019  9:07 AM   NAME: Jonathon Moss   :  1975   MRN:  [id number]   Attending: Adis Curran DO  PCP:  Joy Cueva MD  Treatment Team: Attending Provider: Adis Curran DO; Care Manager: Neena Zapata LMSW; Utilization Review: Jomar Lobato RN; Nurse Practitioner: Marla Tatum NP; Charge Nurse: Artemisa Ahumada Primary Nurse: Pascale Cooper RN; Speech Language Pathologist: Kirk Herrera, HYACINTH    Full Code   SUBJECTIVE:     Mr. Anoop Myers is a 52 yo male with PMH of DM, HTN who presented with c/o left sided weakness and left facial droop 19.   CT head showed age indeterminate infarctions within the left corona radiata/caudate.  CTA head/neck showed stenosis at the distal segment of the right vertebral artery and multifocal stenosis in the P2 segment of the left posterior cerebral artery. MRI brain showed acute to early subacute infarcts in the left cerebellar hemisphere, in the periventricular deep left frontoparietal white matter and likely additional areas of restricted diffusion in the right paramedian yariel.   Echo +PFO.  On statin, ASA.  Has been difficult to place in STR. Plans for 4 week event monitor on DC and if no arrhythmia PFO closure recommended. Pt remains stable today. Awaiting placement. Denies complaints. Interval History (3/22): patient examined at bedside. No acute overnight events. He reports no new symptoms when asked.      Past Medical History:   Diagnosis Date    Acute ischemic stroke ([address] 2019    Acute pancreatitis 2014    Cerebrovascular accident (CVA) due to embolism ([address] 2019    Diabetes ([address] 2002    Diabetes ([address]     Diabetes mellitus     Hypertension      Recent Results (from the past 24 hour(s))   GLUCOSE, POC    Collection Time: 20  7:02 AM   Result Value Ref Range    Glucose (POC) 123 (H) 65 - 100 mg/dL     No Known Allergies  Current Facility-Administered Medications Medication Dose Route Frequency Provider Last Rate Last Dose    magnesium oxide (MAG-OX) tablet 400 mg  400 mg Oral BID Kris Petersenield DO   400 mg at 03/22/20 0945    acetaminophen (TYLENOL) tablet 650 mg  650 mg Oral Q4H PRN Skyler Mobley MD        diphenhydrAMINE (BENADRYL) capsule 25 mg  25 mg Oral Q6H PRN Miranda Beatty MD   25 mg at 03/21/20 1739    acetaminophen (TYLENOL) tablet 650 mg  650 mg Oral Q8H Miranda Beatty MD   650 mg at 03/22/20 1438    lip protectant (BLISTEX) ointment 1 Each  1 Each Topical PRN Judith Shelton MD        metoprolol succinate (TOPROL-XL) XL tablet 25 mg  25 mg Oral DAILY Skyler Mobley MD   25 mg at 03/22/20 0945    polyethylene glycol (MIRALAX) packet 17 g  17 g Oral DAILY PRN Eligio TARIQ DO   17 g at 02/23/20 1708    guaiFENesin (ROBITUSSIN) 100 mg/5 mL oral liquid 100 mg  100 mg Oral Q4H PRN Skyler Mobley MD   100 mg at 03/21/20 1739    hydrALAZINE (APRESOLINE) 20 mg/mL injection 20 mg  20 mg IntraVENous Q4H PRN Carroll Harada, MD   20 mg at 12/23/19 0133    atorvastatin (LIPITOR) tablet 80 mg  80 mg Oral QHS Carroll Harada, MD   80 mg at 03/21/20 2155    lisinopril (PRINIVIL, ZESTRIL) tablet 40 mg  40 mg Oral DAILY Carroll Harada, MD   40 mg at 03/22/20 0945    sodium chloride (NS) flush 5-40 mL  5-40 mL IntraVENous PRN Carroll Harada, MD   10 mL at 03/18/20 0616    ondansetron (ZOFRAN) injection 4 mg  4 mg IntraVENous Q4H PRN Carroll Harada, MD        aspirin chewable tablet 81 mg  81 mg Oral DAILY Carroll Harada, MD   81 mg at 03/22/20 0945    enoxaparin (LOVENOX) injection 40 mg  40 mg SubCUTAneous Q24H Carroll Harada, MD   40 mg at 03/22/20 1438    dextrose 40% (GLUTOSE) oral gel 1 Tube  15 g Oral PRN Carroll Harada, MD        glucagon [facility]) injection 1 mg  1 mg IntraMUSCular PRN Carroll Harada, MD        dextrose (D50W) injection syrg 12.5-25 g  25-50 mL IntraVENous SHUN Montenegro MD           Review of Systems negative with exception of pertinent positives noted above  PHYSICAL EXAM     Visit Vitals  /70 (BP 1 Location: Right arm, BP Patient Position: At rest)   Pulse 64   Temp 97.5 °F (36.4 °C)   Resp 18   Ht 5' 6\" (1.676 m)   Wt 95.3 kg (210 lb)   SpO2 97%   BMI 33.89 kg/m²      Temp (24hrs), Av.8 °F (36.6 °C), Min:97.5 °F (36.4 °C), Max:98.1 °F (36.7 °C)    Oxygen Therapy  O2 Sat (%): 97 % (20 1200)  Pulse via Oximetry: 70 beats per minute (20)  O2 Device: Room air (20)    Intake/Output Summary (Last 24 hours) at 3/22/2020 1531  Last data filed at 3/22/2020 1242  Gross per 24 hour   Intake 710 ml   Output    Net 710 ml        General:       No acute distress    Lungs:          CTA bilaterally. Resp even and nonlabored  Heart:            S1S2 present without murmurs rubs gallops. RRR. No LE edema  Abdomen:    Soft, non tender, non distended. BS present  Extremities: No cyanosis. Left sided hemiparesis  Neurologic:  moves right hand and raises arm at elbow moderately, unable to squeeze my fingers left hand, mildly slurred speech.  PERRLA, strength 4/5 RUE/RLE. No interval changes. Results summary of Diagnostic Studies/Procedures copied from within [facility] EMR:      Edmar Dixon 96 Problems    Diagnosis Date Noted    Hypomagnesemia 2020    PFO (patent foramen ovale) 2019    Arrhythmia 12/15/2019    Hyperlipemia     Acute embolic stroke ([facility] 15/42/8730    Type 2 diabetes mellitus ([facility]     Hypertension      Plan:  Acute embolic stroke  MRI brain showed acute to early subacute infarcts in the left cerebellar hemisphere, in the periventricular deep left frontoparietal white matter and likely additional areas of restricted diffusion in the right paramedian yariel.    +PFO on echo  On ASA, statin  Will need 4 week event monitor on DC, if no arrhythmia then will need PFO closure      Hypomagnesemia  Received IV, and on oral supplementation.      Hypertension  Continue metoprolol and ACE inhibitor  Goal BP <130/80     Type 2 diabetes mellitus:    Monitor, BGL controlled     Disposition: medically stable for DC.   Awaiting Medicaid approval.  May take months for approval.       DVT Prophylaxis: lovenox

## 2020-03-23 LAB — GLUCOSE BLD STRIP.AUTO-MCNC: 126 MG/DL (ref 65–100)

## 2020-03-23 PROCEDURE — 82962 GLUCOSE BLOOD TEST: CPT

## 2020-03-23 PROCEDURE — 74011250637 HC RX REV CODE- 250/637: Performed by: INTERNAL MEDICINE

## 2020-03-23 PROCEDURE — 74011250636 HC RX REV CODE- 250/636: Performed by: INTERNAL MEDICINE

## 2020-03-23 PROCEDURE — 65270000029 HC RM PRIVATE

## 2020-03-23 PROCEDURE — 74011250637 HC RX REV CODE- 250/637: Performed by: HOSPITALIST

## 2020-03-23 RX ADMIN — Medication 400 MG: at 08:06

## 2020-03-23 RX ADMIN — ASPIRIN 81 MG 81 MG: 81 TABLET ORAL at 08:06

## 2020-03-23 RX ADMIN — Medication 400 MG: at 16:09

## 2020-03-23 RX ADMIN — ACETAMINOPHEN 650 MG: 325 TABLET, FILM COATED ORAL at 21:51

## 2020-03-23 RX ADMIN — ACETAMINOPHEN 650 MG: 325 TABLET, FILM COATED ORAL at 13:06

## 2020-03-23 RX ADMIN — ATORVASTATIN CALCIUM 80 MG: 80 TABLET, FILM COATED ORAL at 21:51

## 2020-03-23 RX ADMIN — LISINOPRIL 40 MG: 20 TABLET ORAL at 08:06

## 2020-03-23 RX ADMIN — METOPROLOL SUCCINATE 25 MG: 25 TABLET, FILM COATED, EXTENDED RELEASE ORAL at 08:06

## 2020-03-23 RX ADMIN — DIPHENHYDRAMINE HYDROCHLORIDE 25 MG: 25 CAPSULE ORAL at 16:10

## 2020-03-23 RX ADMIN — ENOXAPARIN SODIUM 40 MG: 40 INJECTION SUBCUTANEOUS at 13:06

## 2020-03-23 RX ADMIN — ACETAMINOPHEN 650 MG: 325 TABLET, FILM COATED ORAL at 05:26

## 2020-03-23 RX ADMIN — GUAIFENESIN 100 MG: 100 SOLUTION ORAL at 16:10

## 2020-03-23 NOTE — PROGRESS NOTES
Nutrition Assessment for:   Follow up     Assessment: Pt is 54 yo male who presented with weakness, facial drooping and slurred speech. He has history of CVA, DM type 2, HTN, pancreatitis, GERD and a smoker. He remains hospitalized awaiting medicaid eligibility. Spoke with pt via room telephone. Per patient, consumed 100% of breakfast this morning (consisting of eggs, grits, and a muffin) and consumed 100% of glucerna shake served with breakfast tray. He reports drinking glucerna shakes with each meal.  Pt claims to be consuming % of meals. No endorses no barriers to intake and voices no nutrition related questions/concerns. Stool x 2 yesterday. Diet: DIET NUTRITIONAL SUPPLEMENTS All Meals; Glucerna Shake  DIET DIABETIC CONSISTENT CARB Mechanical Soft      POC glucose : 123,126 mg/dl (3/22, 3/23). Anthropometrics:  Height: 5' 6\" (167.6 cm),   Last 3 Recorded Weights in this Encounter    12/12/19 0906   Weight: 95.3 kg (210 lb)   Pt has not been weighed since 12/12.    Macronutrient needs: MSJ (using CBW (Current body weight) unknown 95.3 kg)  EER: 2130 kcal/day (22 kcals/kg )  EPR: 106 g/day (1.1 g/kg ) (20%)    Nutrition Intervention:  Meals and snacks: Continue current diet. Medical food supplement therapy: Continue Glucerna TID ([facility]). Order for a weight as pt has not been weighed for ~3 months. Discharge Nutrition Plan: With improving PO intake, frequency of Glucerna may be reduced or discontinued at discharge.     Montrell Mccord [address], [address], [address] [place], [facility] [address]

## 2020-03-23 NOTE — PROGRESS NOTES
Progress Note    3/23/2020  Admit Date: 2019  9:07 AM   NAME: Kath Huizar   :  1975   MRN:  [id number]   Attending: Crys Alford DO  PCP:  Vera Jasmine MD  Treatment Team: Attending Provider: Crys Alford DO; Care Manager: Klaudia Adair LM; Utilization Review: Ellen Morataya RN; Nurse Practitioner: Melanie Gustafson NP; Charge Nurse: Chrissy Lawrence; Occupational Therapist: Bell Lopez OTR/L; Care Manager: Moe Betancourt RN    Full Code   SUBJECTIVE:     Mr. Clovis Jordan is a 54 yo male with PMH of DM, HTN who presented with c/o left sided weakness and left facial droop 19.   CT head showed age indeterminate infarctions within the left corona radiata/caudate.  CTA head/neck showed stenosis at the distal segment of the right vertebral artery and multifocal stenosis in the P2 segment of the left posterior cerebral artery. MRI brain showed acute to early subacute infarcts in the left cerebellar hemisphere, in the periventricular deep left frontoparietal white matter and likely additional areas of restricted diffusion in the right paramedian yariel.   Echo +PFO.  On statin, ASA.  Has been difficult to place in STR. Plans for 4 week event monitor on DC and if no arrhythmia PFO closure recommended. Pt remains stable today. Awaiting placement. Denies complaints. Interval History (3/23): patient examined at bedside. No acute overnight events. He reports no new symptoms when asked.      Past Medical History:   Diagnosis Date    Acute ischemic stroke ([address] 2019    Acute pancreatitis 2014    Cerebrovascular accident (CVA) due to embolism ([address] 2019    Diabetes ([address] 2002    [facility]     Diabetes mellitus     Hypertension      Recent Results (from the past 24 hour(s))   GLUCOSE, POC    Collection Time: 20  7:18 AM   Result Value Ref Range    Glucose (POC) 126 (H) 65 - 100 mg/dL     No Known Allergies  Current Facility-Administered Medications Medication Dose Route Frequency Provider Last Rate Last Dose    magnesium oxide (MAG-OX) tablet 400 mg  400 mg Oral BID Regine Cheney, DO   400 mg at 03/23/20 2082    acetaminophen (TYLENOL) tablet 650 mg  650 mg Oral Q4H PRN Argelia Vaughn MD        diphenhydrAMINE (BENADRYL) capsule 25 mg  25 mg Oral Q6H PRN Ino Avelar MD   25 mg at 03/22/20 1851    acetaminophen (TYLENOL) tablet 650 mg  650 mg Oral Lilia Culp MD   650 mg at 03/23/20 1306    lip protectant (BLISTEX) ointment 1 Each  1 Each Topical PRN Aurora Love MD        metoprolol succinate (TOPROL-XL) XL tablet 25 mg  25 mg Oral DAILY Argelia Vaughn MD   25 mg at 03/23/20 0806    polyethylene glycol (MIRALAX) packet 17 g  17 g Oral DAILY PRN Dolly TARIQ, DO   17 g at 02/23/20 1708    guaiFENesin (ROBITUSSIN) 100 mg/5 mL oral liquid 100 mg  100 mg Oral Q4H PRN Argelia Vaughn MD   100 mg at 03/22/20 1851    hydrALAZINE (APRESOLINE) 20 mg/mL injection 20 mg  20 mg IntraVENous Q4H PRN Yvrose Castellanos MD   20 mg at 12/23/19 0133    atorvastatin (LIPITOR) tablet 80 mg  80 mg Oral QHS Yvrose Castellanos MD   80 mg at 03/22/20 2029    lisinopril (PRINIVIL, ZESTRIL) tablet 40 mg  40 mg Oral DAILY Yvrose Castellanos MD   40 mg at 03/23/20 7208    sodium chloride (NS) flush 5-40 mL  5-40 mL IntraVENous PRN Yvrose Castellanos MD   10 mL at 03/18/20 0616    ondansetron (ZOFRAN) injection 4 mg  4 mg IntraVENous Q4H PRN Yvrose Castellanos MD        aspirin chewable tablet 81 mg  81 mg Oral DAILY Yvrose Castellanos MD   81 mg at 03/23/20 8025    enoxaparin (LOVENOX) injection 40 mg  40 mg SubCUTAneous Q24H Yvrose Castellanos MD   40 mg at 03/23/20 1306    dextrose 40% (GLUTOSE) oral gel 1 Tube  15 g Oral PRN Yvrose Castellanos MD        glucagon [facility]) injection 1 mg  1 mg IntraMUSCular PRN Yvrose Castellanos MD        dextrose (D50W) injection syrg 12.5-25 g  25-50 mL IntraVENous SHUN Montenegro MD           Review of Systems negative with exception of pertinent positives noted above  PHYSICAL EXAM     Visit Vitals  /64 (BP 1 Location: Right arm, BP Patient Position: Sitting)   Pulse 68   Temp 97.5 °F (36.4 °C)   Resp 20   Ht 5' 6\" (1.676 m)   Wt 95.3 kg (210 lb)   SpO2 97%   BMI 33.89 kg/m²      Temp (24hrs), Av.8 °F (36.6 °C), Min:97.4 °F (36.3 °C), Max:98.1 °F (36.7 °C)    Oxygen Therapy  O2 Sat (%): 97 % (20 1134)  Pulse via Oximetry: 70 beats per minute (20)  O2 Device: Room air (20)    Intake/Output Summary (Last 24 hours) at 3/23/2020 1458  Last data filed at 3/22/2020 2030  Gross per 24 hour   Intake 480 ml   Output    Net 480 ml        General:       No acute distress    Lungs:          CTA bilaterally. Resp even and nonlabored  Heart:            S1S2 present without murmurs rubs gallops. RRR. No LE edema  Abdomen:    Soft, non tender, non distended. BS present  Extremities: No cyanosis. Left sided hemiparesis  Neurologic:  moves right hand and raises arm at elbow moderately, unable to squeeze my fingers left hand, mildly slurred speech.  PERRLA, strength 4/5 RUE/RLE. No interval changes. Results summary of Diagnostic Studies/Procedures copied from within [facility] EMR:      [address] Problems    Diagnosis Date Noted    Hypomagnesemia 2020    PFO (patent foramen ovale) 2019    Arrhythmia 12/15/2019    Hyperlipemia     [facility]     Type 2 diabetes mellitus ([facility]     Hypertension      Plan:  Acute embolic stroke  MRI brain showed acute to early subacute infarcts in the left cerebellar hemisphere, in the periventricular deep left frontoparietal white matter and likely additional areas of restricted diffusion in the right paramedian yariel.    +PFO on echo  On ASA, statin  Will need 4 week event monitor on DC, if no arrhythmia then will need PFO closure      Hypomagnesemia  Received IV, and on oral supplementation.      Hypertension  Continue metoprolol and ACE inhibitor  Goal BP <130/80     Type 2 diabetes mellitus:    Monitor, BGL controlled     Disposition: medically stable for DC.   Awaiting Medicaid approval.  May take months for approval.       DVT Prophylaxis: lovenox

## 2020-03-24 LAB — GLUCOSE BLD STRIP.AUTO-MCNC: 109 MG/DL (ref 65–100)

## 2020-03-24 PROCEDURE — 82962 GLUCOSE BLOOD TEST: CPT

## 2020-03-24 PROCEDURE — 74011250637 HC RX REV CODE- 250/637: Performed by: INTERNAL MEDICINE

## 2020-03-24 PROCEDURE — 74011250637 HC RX REV CODE- 250/637: Performed by: HOSPITALIST

## 2020-03-24 PROCEDURE — 97535 SELF CARE MNGMENT TRAINING: CPT

## 2020-03-24 PROCEDURE — 97530 THERAPEUTIC ACTIVITIES: CPT

## 2020-03-24 PROCEDURE — 65270000029 HC RM PRIVATE

## 2020-03-24 PROCEDURE — 92507 TX SP LANG VOICE COMM INDIV: CPT

## 2020-03-24 PROCEDURE — 74011250636 HC RX REV CODE- 250/636: Performed by: INTERNAL MEDICINE

## 2020-03-24 RX ORDER — LANOLIN ALCOHOL/MO/W.PET/CERES
400 CREAM (GRAM) TOPICAL DAILY
Status: DISCONTINUED | OUTPATIENT
Start: 2020-03-25 | End: 2020-05-02

## 2020-03-24 RX ADMIN — GUAIFENESIN 100 MG: 100 SOLUTION ORAL at 16:36

## 2020-03-24 RX ADMIN — Medication 400 MG: at 08:21

## 2020-03-24 RX ADMIN — METOPROLOL SUCCINATE 25 MG: 25 TABLET, FILM COATED, EXTENDED RELEASE ORAL at 08:21

## 2020-03-24 RX ADMIN — LISINOPRIL 40 MG: 20 TABLET ORAL at 08:21

## 2020-03-24 RX ADMIN — ACETAMINOPHEN 650 MG: 325 TABLET, FILM COATED ORAL at 22:06

## 2020-03-24 RX ADMIN — ASPIRIN 81 MG 81 MG: 81 TABLET ORAL at 08:21

## 2020-03-24 RX ADMIN — ATORVASTATIN CALCIUM 80 MG: 80 TABLET, FILM COATED ORAL at 22:06

## 2020-03-24 RX ADMIN — DIPHENHYDRAMINE HYDROCHLORIDE 25 MG: 25 CAPSULE ORAL at 16:36

## 2020-03-24 RX ADMIN — ACETAMINOPHEN 650 MG: 325 TABLET, FILM COATED ORAL at 13:42

## 2020-03-24 RX ADMIN — ENOXAPARIN SODIUM 40 MG: 40 INJECTION SUBCUTANEOUS at 13:42

## 2020-03-24 NOTE — PROGRESS NOTES
Problem: Mobility Impaired (Adult and Pediatric)  Goal: *Acute Goals and Plan of Care  Description  GOALS UPDATED ON RE-ASSESSMENT 3/20/2020 (advancements to goals in bold):  1. Patient will perform bed mobility with supervision and 0 verbal cues within 7 treatment days. 2. Patient will perform transfer bed to chair with SUPERVISION within 7 treatment days. 3. Patient will demonstrate fair+ dynamic balance throughout stance phase on L LE within 7 treatment days. 4. Patient will require STAND BY ASSIST throughout swing phase on (to advance) L LE within 7 treatment days. 5. Patient demonstrate 3+/5 strength in L LE hip flexion, hip abduction, and hip adduction within 7 treatment days. 6. Patient will ambulate 200ft+ with STAND BY ASSIST and least retrictive assistive device within 7 treatment days. 7. Patient will perform wheelchair mobility x75ft with MODIFIED INDEPENDENCE within 7 treatment days. PHYSICAL THERAPY: Daily Note and AM 3/24/2020  INPATIENT: PT Visit Days : 2  Payor: MEDICAID PENDING / Plan: Keri Rizvi PENDING / Product Type: Medicaid /       NAME/AGE/GENDER: Cristina Enriquez is a 53 y.o. male   PRIMARY DIAGNOSIS: Acute ischemic stroke ([address] [I63.9]  Cerebrovascular accident (CVA) due to embolism ([address] [Q79.9] Acute embolic stroke ([address] Acute embolic stroke ([address]       ICD-10: Treatment Diagnosis:   · Difficulty in walking, Not elsewhere classified (R26.2)  · Hemiplegia and hemiparesis following cerebral infarction affecting left non-dominant side (P10.943)   Precaution/Allergies:  Patient has no known allergies. ASSESSMENT:     Mr. Ellis Nair is a 53year old admitted R MCA CVA. Patient was supine upon contact and agreeable to PT. Patient had increased difficulty performing supine to sit as patient had slid down in the bed and could not get a great  on the bed rail. Patient eventually able to perform supine to sit with SBA and additional time.  Patient transfers to standing with CGA-SBA where he ambulates 150' with luke walker, CGA-min assist (due to 3 moderate LOB needing PT intervention) and wheelchair follow for safety. Patient took a seated rest break then ambulated an additional 100'. Patient performs SPT back to bed with SBA. Overall good progress towards physical therapy goals. Goals listed above are still appropriate. Will continue efforts as patient is still below functional baseline. This section established at most recent assessment   PROBLEM LIST (Impairments causing functional limitations):  1. Decreased Strength  2. Decreased ADL/Functional Activities  3. Decreased Transfer Abilities  4. Decreased Ambulation Ability/Technique  5. Decreased Balance  6. Increased Pain  7. Decreased Activity Tolerance  8. Increased Fatigue  9. Decreased Flexibility/Joint Mobility   INTERVENTIONS PLANNED: (Benefits and precautions of physical therapy have been discussed with the patient.)  1. Balance Exercise  2. Bed Mobility  3. Family Education  4. Gait Training  5. Home Exercise Program (HEP)  6. Manual Therapy  7. Neuromuscular Re-education/Strengthening  8. Range of Motion (ROM)  9. Therapeutic Activites  10. Therapeutic Exercise/Strengthening  11. Transfer Training     TREATMENT PLAN: Frequency/Duration: 3 times a week for duration of hospital stay  Rehabilitation Potential For Stated Goals: Good     REHAB RECOMMENDATIONS (at time of discharge pending progress):    Placement: It is my opinion, based on this patient's performance to date, that Mr. Radha Roldan may benefit from intensive therapy at an [address] [place] [address] after discharge due to a probable need for close medical supervision by a rehab physician, a probable need for multiple therapy disciplines and potential to make ongoing and sustainable functional improvement that is of practical value. .  Equipment:    TBD pending progress with therapy.              HISTORY:   History of Present Injury/Illness (Reason for Referral):  Per H&P: \"Pt is a 54 y/o smoker with DM, HTN, who presented to ER with L leg and arm weakness, L facial droop, dysarthria. First noted L leg weakness late night 12/11 when he woke up to go to the bathroom. He was normal when he went to bed around 1030. Woke up this morning and had persistent weakness L leg and also now noted in L arm. EMS called. Noted to have slurred speech and L facial droop as well. Code S called in ER around 9am.  MRI with acute infarcts in L cerebellar hemisphere, deep frontoparietal white matter, and R paramedian yariel. Also noted old lacunar infarcts. No large vessel occlusion on CTA, but some stenosis noted. No hx afib, TIA, CVA. No CP, palpitations, SOB. \"  Past Medical History/Comorbidities:   Mr. Sneha Munguia  has a past medical history of Acute ischemic stroke ([address] (12/12/2019), Acute pancreatitis (11/19/2014), Cerebrovascular accident (CVA) due to embolism ([address] (12/12/2019), Diabetes ([address] (2002), Diabetes ([address] Diabetes mellitus, and Hypertension. He also has no past medical history of Arthritis, Asthma, Autoimmune disease ([address] CAD (coronary artery disease), Cancer ([address] Chronic kidney disease, COPD, Dementia, Dementia ([address] Heart failure ([address] Ill-defined condition, Infectious disease, Liver disease, Other ill-defined conditions(799.89), Psychiatric disorder, PUD (peptic ulcer disease), Seizures ([address] or Sleep disorder. Mr. Sneha Munguia  has a past surgical history that includes hx hernia repair and hx orthopaedic.   Social History/Living Environment:   Home Environment: Apartment  # Steps to Enter: 12  Rails to Enter: Yes  [employer] : Bilateral  One/Two Story Residence: One story  Living Alone: No  Support Systems: Spouse/Significant Other/Partner  Patient Expects to be Discharged to[de-identified] Unknown  Current DME Used/Available at Home: None  Tub or Shower Type: Tub/Shower combination  Prior Level of Function/Work/Activity:  Independent, lives with wife in 2nd story 1 level apartment. No recent falls. Number of Personal Factors/Comorbidities that affect the Plan of Care: 1-2: MODERATE COMPLEXITY   EXAMINATION:   Most Recent Physical Functioning:   Gross Assessment:                  Posture:     Balance:  Sitting: Intact  Standing: Impaired; With support  Standing - Static: Fair  Standing - Dynamic : Fair Bed Mobility:  Supine to Sit: Stand-by assistance  Sit to Supine: Stand-by assistance  Wheelchair Mobility:     Transfers:     Gait:     Base of Support: Center of gravity altered;Narrowed  Speed/Clotilde: Slow  Step Length: Left shortened  Gait Abnormalities: Hemiplegic  Distance (ft): (150' then 100)  Assistive Device: Gait belt;Walker luke  Ambulation - Level of Assistance: Contact guard assistance;Minimal assistance  Interventions: Safety awareness training; Tactile cues; Verbal cues      Body Structures Involved:  1. Nerves  2. Voice/Speech  3. Bones  4. Joints  5. Muscles Body Functions Affected:  1. Mental  2. Sensory/Pain  3. Neuromusculoskeletal  4. Movement Related Activities and Participation Affected:  1. General Tasks and Demands  2. Mobility  3. Self Care  4. Interpersonal Interactions and Relationships   Number of elements that affect the Plan of Care: 4+: HIGH COMPLEXITY   CLINICAL PRESENTATION:   Presentation: Evolving clinical presentation with changing clinical characteristics: MODERATE COMPLEXITY   CLINICAL DECISION MAKIN [facility] Short Form  How much difficulty does the patient currently have. .. Unable A Lot A Little None   1. Turning over in bed (including adjusting bedclothes, sheets and blankets)? [] 1   [] 2   [] 3   [x] 4   2. Sitting down on and standing up from a chair with arms ( e.g., wheelchair, bedside commode, etc.)   [] 1   [] 2   [x] 3   [] 4   3. Moving from lying on back to sitting on the side of the bed?    [] 1   [] 2   [x] 3   [] 4   How much help from another person does the patient currently need. .. Total A Lot A Little None   4. Moving to and from a bed to a chair (including a wheelchair)? [] 1   [] 2   [x] 3   [] 4   5. Need to walk in hospital room? [] 1   [] 2   [x] 3   [] 4   6. Climbing 3-5 steps with a railing? [] 1   [x] 2   [] 3   [] 4   © 2007, Trustees of [address] [place] [address] [address] [id number], under license to [employer]. All rights reserved      Score:  Initial: 13 Most Recent: 18 (Date:3/9/2020)    Interpretation of Tool:  Represents activities that are increasingly more difficult (i.e. Bed mobility, Transfers, Gait). Medical Necessity:     · Patient is expected to demonstrate progress in   · strength, range of motion, balance, coordination, and functional technique  ·  to   · increase independence with all mobility. · .  Reason for Services/Other Comments:  · Patient continues to require skilled intervention due to   · medical complications and mobility deficits which impact his level of function, safety, and independence as indicated above. · .   Use of outcome tool(s) and clinical judgement create a POC that gives a: Questionable prediction of patient's progress: MODERATE COMPLEXITY        TREATMENT:      Pre-treatment Symptoms/Complaints:  none  Pain: Initial: 0/10 Post Session:  0/10     Therapeutic Activity: (    24 Minutes): Therapeutic activities including bed mobility training, transfer training, ambulation on level ground, instruction in sequencing with luke walker, scootiing, SPT training, and patient education  to improve mobility, strength and balance. Required moderate Safety awareness training; Tactile cues; Verbal cues to promote static and dynamic balance in standing and promote coordination of bilateral, lower extremity(s).      Braces/Orthotics/Lines/Etc:   · Sling to LUE  Treatment/Session Assessment:    · Response to Treatment:  See above  · Interdisciplinary Collaboration:   o Physical Therapy Assistant  o Registered Nurse  · After treatment position/precautions:   o Supine in bed  o Bed alarm/tab alert on  o Bed/Chair-wheels locked  o Bed in low position  o Call light within reach  o RN notified   · Compliance with Program/Exercises: Compliant all of the time  · Recommendations/Intent for next treatment session: \"Next visit will focus on advancements to more challenging activities and reduction in assistance provided\".     Total Treatment Duration:  PT Patient Time In/Time Out  Time In: 0931  Time Out: Trever Mg, TODD

## 2020-03-24 NOTE — PROGRESS NOTES
Problem: Self Care Deficits Care Plan (Adult)  Goal: *Acute Goals and Plan of Care (Insert Text)  Description    Goals per Re-evaluation on 3/18/2020:   1. Patient will demonstrate appropriate safety awareness and protection of L UE during bed mobility and functional transfers with minimal cues. (Progressing, still needs cues, 3/18/2020)  2. Patient will complete total body bathing and dressing with Min A and adaptive equipment as needed. (Progressing, UB bathing Min A, UB dressing at [address] [place] [address] , LB dressing at [facility], 3/18/2020)  3. Patient will complete weightbearing into the L UE with ADL tasks with minimal assistance to improve ability to use as a functional assist during ADL tasks. (Progressing, 3/18/2020)4. Patient will demonstrate L UE SROM HEP within 7 days. (Progressing, 3/18/2020)  5. Pt will complete bed mobility with Mod I and minimal verbal cueing (Progressing, SBA 3/18/2020). 6. Pt will complete dynamic sitting balance for ADLs at mod I with good balance (Progressing, 3/18/2020)  7. Pt will complete functional transfers with Supervision with equipment as needed. (Progressing, SBA 3/18/2020)  8. Pt will complete grooming tasks in standing at sink level x5 mins with good balance and equipment as needed (Progressing, CGA with fair balance 3/18/2020)  9. Pt will complete grooming standing at sink level with SBA and use of adaptive equipment as needed. 10. Pt will don/doff UE sling with SBA and use of minimal verbal and visual cueing for correct application.     Outcome: Progressing Towards Goal     OCCUPATIONAL THERAPY: Daily Note    3/24/2020  INPATIENT: OT Visit Days: 4  Payor: MEDICAID PENDING / Plan: Keri Rizvi PENDING / Product Type: Medicaid /      NAME/AGE/GENDER: Cristina Enriquez is a 53 y.o. male   PRIMARY DIAGNOSIS:  Acute ischemic stroke ([address] [I63.9]  Cerebrovascular accident (CVA) due to embolism ([address] [Q84.8] Acute embolic stroke ([address] Acute embolic stroke ([address]       ICD-10: Treatment Diagnosis:    · Generalized Muscle Weakness (M62.81)  · Other lack of cordination (R27.8)  · Hemiplegia and hemiparesis following cerebral infarction affecting   · left non-dominant side (I69.354)  · Abnormal posture (R29.3)   Precautions/Allergies:    NO PULLING ON LUE   LUE in sling with shoulder joint approximated and supported, needs taping   Patient has no known allergies. ASSESSMENT:     Mr. Kathleen Jensen presents to the hospital with L sided weakness and acute ischemic CVA. MRI revealed acute to subacute L cerebellar and R paramedian yariel infarcts. 3/24/2020: Pt found supine in bed upon arrival, alert and agreeable to OT treatment. Pt practiced bed mobility with SBA, functional transfers including w/c and shower transfers with Jody-CGA. Ambulation with Jody-CGA and luke walker. Pt practiced upper body dressing with MaxA to don/doff sling, ModA/cues for adaptive technique to don/doff gown, MaxA for brief management, total body bathing with Jody for upper body, pt required MaxA to bathe L armpit and total assist for RUE. Pt able to wash kelton/bowel in standing with SBA-CGA, good standing balance. Total assist for socks and to bathe B feet. Pt left seated in w/c with call bell within reach. Pt is making progress towards goals. See above. Continue OT POC. This section established at most recent assessment   PROBLEM LIST (Impairments causing functional limitations):  1. Decreased Strength  2. Decreased ADL/Functional Activities  3. Decreased Transfer Abilities  4. Decreased Ambulation Ability/Technique  5. Decreased Balance  6. Decreased Activity Tolerance  7. Increased Fatigue  8. Decreased Flexibility/Joint Mobility  9. Decreased Knowledge of Precautions  10. Decreased [place] with Home Exercise Program   INTERVENTIONS PLANNED: (Benefits and precautions of occupational therapy have been discussed with the patient.)  1. Activities of daily living training  2. Adaptive equipment training  3.  Balance training  4. Clothing management  5. Cognitive training  6. Donning&doffing training  7. Keanu tech training  8. Neuromuscular re-eduation  9. Therapeutic activity  10. Therapeutic exercise     TREATMENT PLAN: Frequency/Duration: Follow patient 3 times per week to address above goals. Rehabilitation Potential For Stated Goals: Excellent     REHAB RECOMMENDATIONS (at time of discharge pending progress):    Placement: It is my opinion, based on this patient's performance to date, that Mr. Ellis Nair may benefit from intensive therapy at an [address] [place] [address] after discharge due to potential to make ongoing and sustainable functional improvement that is of practical value. [facility] Pt functioning far below independent baseline, demonstrating good improvement and participation. Pt would likely benefit greatly and increase independence from inpatient rehab stay. Equipment:    TBD               OCCUPATIONAL PROFILE AND HISTORY:   History of Present Injury/Illness (Reason for Referral):  See H&P  Past Medical History/Comorbidities:   Mr. Ellis Nair  has a past medical history of Acute ischemic stroke ([address] (12/12/2019), Acute pancreatitis (11/19/2014), Cerebrovascular accident (CVA) due to embolism ([address] (12/12/2019), Diabetes ([address] (2002), Diabetes ([address] Diabetes mellitus, and Hypertension. He also has no past medical history of Arthritis, Asthma, Autoimmune disease ([address] CAD (coronary artery disease), Cancer ([address] Chronic kidney disease, COPD, Dementia, Dementia ([address] Heart failure ([address] Ill-defined condition, Infectious disease, Liver disease, Other ill-defined conditions(799.89), Psychiatric disorder, PUD (peptic ulcer disease), Seizures ([address] or Sleep disorder. Mr. Ellis Nair  has a past surgical history that includes hx hernia repair and hx orthopaedic.   Social History/Living Environment:   Home Environment: Apartment  # Steps to Enter: 12  Rails to Enter: Yes  [employer] : Bilateral  One/Two Story Residence: One story  Living Alone: No  Support Systems: Spouse/Significant Other/Partner  Patient Expects to be Discharged to[de-identified] Unknown  Current DME Used/Available at Home: None  Tub or Shower Type: Tub/Shower combination  Prior Level of Function/Work/Activity:  Pt lives at home with his wife. Pt is typically independent with ADL/functional mobility. Pt does not drive. Pt was working part-time at [employer]. Personal Factors:          Age:  53 y.o. Past/Current Experience:  CVA with flaccid L side        Other factors that influence how disability is experienced by the patient:  multiple co-morbidities    Number of Personal Factors/Comorbidities that affect the Plan of Care: Extensive review of physical, cognitive, and psychosocial performance (3+):  HIGH COMPLEXITY   ASSESSMENT OF OCCUPATIONAL PERFORMANCE[de-identified]   Activities of Daily Living:   Basic ADLs (From Assessment) Complex ADLs (From Assessment)   Feeding: Setup  Oral Facial Hygiene/Grooming: Minimum assistance  Bathing: Moderate assistance  Upper Body Dressing: Minimum assistance  Lower Body Dressing: Maximum assistance  Toileting: Moderate assistance Instrumental ADL  Meal Preparation: Total assistance  Homemaking: Total assistance  Medication Management: Total assistance  Financial Management: Total assistance   Grooming/Bathing/Dressing Activities of Daily Living   Grooming  Position Performed: Seated in chair  Washing Hands: Stand-by assistance     Upper Body Bathing  Bathing Assistance: Min A   Position Performed: Seated in chair      Lower Body Bathing  Perineal  : Stand-by assistance  Position Performed: Standing  Adaptive Equipment: Grab bar  Lower Body : Minimum assistance  Position Performed: Seated in chair     [address]:  Moderate assistance Functional Transfers  Bathroom Mobility: Minimum assistance;Contact guard assistance  Shower Transfer: Minimum assistance   Lower Body Dressing Assistance  Socks: Total assistance (dependent) Bed/Mat Mobility  Supine to Sit: Stand-by assistance  Sit to Supine: Stand-by assistance  Sit to Stand: Contact guard assistance;Minimum assistance  Stand to Sit: Contact guard assistance  Bed to Chair: Minimum assistance;Contact guard assistance  Scooting: Stand-by assistance     Most Recent Physical Functioning:   Gross Assessment:  AROM: Grossly decreased, non-functional(LUE flaccid )  PROM: Generally decreased, functional(LUE proximally, shoulder flexion to ~90*)  Strength: Grossly decreased, non-functional(LUE)  Coordination: Grossly decreased, non-functional(LUE)  Tone: Abnormal(LUE)  Sensation: Intact(BUEs to light touch)               Posture:     Balance:  Sitting: Intact  Sitting - Static: Good (unsupported)  Sitting - Dynamic: Good (unsupported)  Standing: Impaired  Standing - Static: Good;Fair  Standing - Dynamic : Fair;Constant support Bed Mobility:  Supine to Sit: Stand-by assistance  Sit to Supine: Stand-by assistance  Scooting: Stand-by assistance  Wheelchair Mobility:     Transfers:  Sit to Stand: Contact guard assistance;Minimum assistance  Stand to Sit: Contact guard assistance  Bed to Chair: Minimum assistance;Contact guard assistance            Patient Vitals for the past 6 hrs:   BP BP Patient Position SpO2 Pulse   03/24/20 1154 119/79 Sitting 97 % 67       Mental Status  Neurologic State: Alert  Orientation Level: Oriented X4  Cognition: Follows commands  Perception: Verbal, Tactile  Perseveration: Tactile cues provided, Verbal cues provided  Safety/Judgement: Fall prevention                          Physical Skills Involved:  1. Range of Motion  2. Balance  3. Strength  4. Activity Tolerance  5. Fine Motor Control  6. Gross Motor Control Cognitive Skills Affected (resulting in the inability to perform in a timely and safe manner):  1. Perception  2. Expression Psychosocial Skills Affected:  1. Habits/Routines  2. Environmental Adaptation  3.  Social Interaction  4. Emotional Regulation  5. Self-Awareness  6. Awareness of Others  7. Social Roles   Number of elements that affect the Plan of Care: 5+:  HIGH COMPLEXITY   CLINICAL DECISION MAKIN[address] [place] [address] AM-PAC 6 Clicks   Daily Activity Inpatient Short Form  How much help from another person does the patient currently need. .. Total A Lot A Little None   1. Putting on and taking off regular lower body clothing? [] 1   [x] 2   [] 3   [] 4   2. Bathing (including washing, rinsing, drying)? [] 1   [x] 2   [] 3   [] 4   3. Toileting, which includes using toilet, bedpan or urinal?   [] 1   [x] 2   [] 3   [] 4   4. Putting on and taking off regular upper body clothing? [] 1   [] 2   [x] 3   [] 4   5. Taking care of personal grooming such as brushing teeth? [] 1   [] 2   [x] 3   [] 4   6. Eating meals? [] 1   [] 2   [x] 3   [] 4   © , Trustees of [address] [place] [address], under license to [employer]. All rights reserved      Score:  Initial: 11 Most Recent: 15 (Date: 3/18/2020 )    Interpretation of Tool:  Represents activities that are increasingly more difficult (i.e. Bed mobility, Transfers, Gait). Medical Necessity:     · Patient demonstrates   · good and excellent  ·  rehab potential due to higher previous functional level. Reason for Services/Other Comments:  · Patient continues to require skilled intervention due to   · Decreased independence with ADL/functional transfers that impacts overall quality of life. · .   Use of outcome tool(s) and clinical judgement create a POC that gives a: MODERATE COMPLEXITY         TREATMENT:   (In addition to Assessment/Re-Assessment sessions the following treatments were rendered)     Pre-treatment Symptoms/Complaints:  \"I know I need to be wearing it. \" In reference to UE sling.   Pain: Initial:   Pain Intensity 1: 0  Post Session: Unchanged     Self Care: (68 minutes): Procedure(s) (per grid) utilized to improve and/or restore self-care/home management as related to dressing and bathing. Required min visual, verbal, manual and tactile cueing to facilitate activities of daily living skills. Pt practiced bed mobility with SBA, functional transfers including w/c and shower transfers with Jody-CGA. Ambulation with Jody-CGA and luke walker. Pt practiced upper body dressing with MaxA to don/doff sling, ModA/cues for adaptive technique to don/doff gown, MaxA for brief management, total body bathing with Jody for upper body, pt required MaxA to bathe L armpit and total assist for RUE. Pt able to wash kelton/bowel in standing with SBA-CGA, good standing balance. Total assist for socks and to bathe B feet     Date:  3/19/20 Date:   Date:     Activity/Exercise Parameters Parameters Parameters   L hand flexion at all joints AROM 20/1     L wrist flexion/ extension AROM 20/1     L elbow flexion SROM and table slides 20/1     L pronation/ supination 20/1     L shoulder flexion SROM/ AAROM 20/1                     Braces/Orthotics/Lines/Etc:   · O2 device: Room air  · LUE sling  · Treatment/Session Assessment:    · Response to Treatment:  Pt making good progress towards goals and is a great participant. · Interdisciplinary Collaboration:   o Occupational Therapist  o Registered Nurse  o \"Ambulation Team\" (2 PTs)  · After treatment position/precautions:   o Bed/Chair-wheels locked  o Bed in low position  o Call light within reach  o RN notified  o up in w/c   · Compliance with Program/Exercises: Compliant all of the time, Will assess as treatment progresses. · Recommendations/Intent for next treatment session: \"Next visit will focus on advancements to more challenging activities and reduction in assistance provided\".   Total Treatment Duration:   OT Patient Time In/Time Out  Time In: 1410  Time Out: [address], OTR/L

## 2020-03-24 NOTE — PROGRESS NOTES
Hospitalist Note     Admit Date:  2019  9:07 AM   Name:  Sandra Allen   Age:  53 y.o.  :  1975   MRN:  [id number]   PCP:  Tyrese Tipton MD  Treatment Team: Attending Provider: Matt Mcdermott MD; Care Manager: Rebecca Valdez LMSW; Utilization Review: Chema Law RN; Nurse Practitioner: Perry Espinosa NP; Care Manager: Em Villarreal, RN; Primary Nurse: Mary Baca RN; Charge Nurse: Cheko Chauhan; Physical Therapy Assistant: Kyra Desai PTA; Occupational Therapist: Venkata Ivan, OTR/L; Speech Language Pathologist: La Nena Whitley, HYACINTH    HPI/Subjective:   Mr. Latrell Collins is a 54 yo male with PMH of DM, HTN who presented with c/o left sided weakness and left facial droop 19.   CT head showed age indeterminate infarctions within the left corona radiata/caudate.  CTA head/neck showed stenosis at the distal segment of the right vertebral artery and multifocal stenosis in the P2 segment of the left posterior cerebral artery. MRI brain showed acute to early subacute infarcts in the left cerebellar hemisphere, in the periventricular deep left frontoparietal white matter and likely additional areas of restricted diffusion in the right paramedian yariel.   Echo +PFO.  On statin, ASA.  Has been difficult to place in STR. Plans for 4 week event monitor on DC and if no arrhythmia PFO closure recommended. Pt remains stable today. Awaiting placement. Denies complaints. 3/24: Up in bed, eating french fries. Residual left sided weakness. No other complaints.      No other complaints  Objective:     Patient Vitals for the past 24 hrs:   Temp Pulse Resp BP SpO2   20 1154 97.7 °F (36.5 °C) 67 18 119/79 97 %   20 0739 98 °F (36.7 °C) 65 18 123/82 98 %   20 0400 97.6 °F (36.4 °C) 65 16 120/83 97 %   20 0000 97.9 °F (36.6 °C) 72 18 113/75 97 %   20 2000 98.3 °F (36.8 °C) 70 20 109/74 98 %   20 1450 98.2 °F (36.8 °C) 69 17 103/69 98 % Oxygen Therapy  O2 Sat (%): 97 % (03/24/20 1154)  Pulse via Oximetry: 70 beats per minute (03/19/20 2000)  O2 Device: Room air (03/19/20 2000)    Estimated body mass index is 33.89 kg/m² as calculated from the following:    Height as of this encounter: 5' 6\" (1.676 m). Weight as of this encounter: 95.3 kg (210 lb). Intake/Output Summary (Last 24 hours) at 3/24/2020 1208  Last data filed at 3/23/2020 1909  Gross per 24 hour   Intake 240 ml   Output    Net 240 ml       *Note that automatically entered I/Os may not be accurate; dependent on patient compliance with collection and accurate  by techs. General:    Well nourished. Alert. CV:   RRR. No murmur, rub, or gallop. Lungs:   CTAB. No wheezing, rhonchi, or rales. Abdomen:   Soft, nontender, nondistended. Extremities: Warm and dry. No cyanosis or edema. Skin:     No rashes or jaundice. Neuro:  No gross focal deficits. Mildly slurred speech, left sided weakness.      Data Review:  I have reviewed all labs, meds, and studies from the last 24 hours:    Recent Results (from the past 24 hour(s))   GLUCOSE, POC    Collection Time: 03/24/20  6:28 AM   Result Value Ref Range    Glucose (POC) 109 (H) 65 - 100 mg/dL        All Micro Results     None          Current Meds:  Current Facility-Administered Medications   Medication Dose Route Frequency    magnesium oxide (MAG-OX) tablet 400 mg  400 mg Oral BID    acetaminophen (TYLENOL) tablet 650 mg  650 mg Oral Q4H PRN    diphenhydrAMINE (BENADRYL) capsule 25 mg  25 mg Oral Q6H PRN    acetaminophen (TYLENOL) tablet 650 mg  650 mg Oral Q8H    lip protectant (BLISTEX) ointment 1 Each  1 Each Topical PRN    metoprolol succinate (TOPROL-XL) XL tablet 25 mg  25 mg Oral DAILY    polyethylene glycol (MIRALAX) packet 17 g  17 g Oral DAILY PRN    guaiFENesin (ROBITUSSIN) 100 mg/5 mL oral liquid 100 mg  100 mg Oral Q4H PRN    hydrALAZINE (APRESOLINE) 20 mg/mL injection 20 mg  20 mg IntraVENous Q4H PRN    atorvastatin (LIPITOR) tablet 80 mg  80 mg Oral QHS    lisinopril (PRINIVIL, ZESTRIL) tablet 40 mg  40 mg Oral DAILY    sodium chloride (NS) flush 5-40 mL  5-40 mL IntraVENous PRN    ondansetron (ZOFRAN) injection 4 mg  4 mg IntraVENous Q4H PRN    aspirin chewable tablet 81 mg  81 mg Oral DAILY    enoxaparin (LOVENOX) injection 40 mg  40 mg SubCUTAneous Q24H    dextrose 40% (GLUTOSE) oral gel 1 Tube  15 g Oral PRN    glucagon (GLUCAGEN) injection 1 mg  1 mg IntraMUSCular PRN    dextrose (D50W) injection syrg 12.5-25 g  25-50 mL IntraVENous PRN       Other Studies:  Results for orders placed or performed during the hospital encounter of 19   2D ECHO COMPLETE ADULT (TTE) W OR WO CONTR    Narrative    [facility] [address] [facility], [address] [facility]  [phone]    Transthoracic Echocardiogram  2D, M-mode, Doppler, and Color Doppler    Patient: Lory Dorantes  MR #: [id number]  : 1975  Age: 53 years  Gender: Male  Study date: 12-Dec-2019  Account #: [de-identified]  Height: 66 in  Weight: 209.7 lb  BSA: 2.04 mï¾²  Status:Routine  Location: ER03  BP: 133/ 76    Allergies: NO KNOWN ALLERGENS    Sonographer:  LUZ Daniel  Group:  [facility]  Referring Physician:  Polo Giraldo. Miky Lim MD  Reading Physician:  Liss Macias MD [facility]    INDICATIONS: CVA    PROCEDURE: This was a routine study. A transthoracic echocardiogram was  performed. The study included complete 2D imaging, M-mode, complete spectral  Doppler, and color Doppler. Intravenous contrast (Definity) was administered. Image quality was adequate. LEFT VENTRICLE: Size was normal. Systolic function was normal. Ejection  fraction was estimated in the range of 65 % to 70 %. There were no regional  wall motion abnormalities. There was mild concentric hypertrophy.  Left  ventricular diastolic function parameters were normal. Avg E/e': 11.05.    RIGHT VENTRICLE: The size was normal. Systolic function was normal. The  tricuspid jet envelope definition was inadequate for estimation of RV   systolic  pressure. LEFT ATRIUM: Size was normal.    ATRIAL SEPTUM: Agitated saline contrast injection (bubble study) was   performed. There was a right-to-left shunt, in the baseline state. RIGHT ATRIUM: Size was normal.    SYSTEMIC VEINS: IVC: The inferior vena cava was normal in size and course. The  respirophasic change in diameter was more than 50%. AORTIC VALVE: The valve was trileaflet. Leaflets exhibited mild sclerosis. There was no evidence for stenosis. There was no insufficiency. MITRAL VALVE: Valve structure was normal. There was no evidence for stenosis. There was no regurgitation. TRICUSPID VALVE: The valve structure was normal. There was no evidence for  stenosis. There was trivial regurgitation. PULMONIC VALVE: Not well visualized. There was no evidence for stenosis. There  was no insufficiency. PERICARDIUM: There was no pericardial effusion. AORTA: The root exhibited normal size. SUMMARY:    -  Left ventricle: Systolic function was normal. Ejection fraction was  estimated in the range of 65 % to 70 %. There were no regional wall motion  abnormalities. There was mild concentric hypertrophy. -  Atrial septum: Agitated saline contrast injection (bubble study) was  performed. There was a right-to-left shunt, in the baseline state. -  Inferior vena cava, hepatic veins: The respirophasic change in diameter   was  more than 50%.     SYSTEM MEASUREMENT TABLES    2D mode  AoR Diam (2D): 2.8 cm  LA Dimension (2D): 3.5 cm  Left Atrium Systolic Volume Index; Method of Disks, Biplane; 2D mode;: 26   ml/m2  IVS/LVPW (2D): 1  IVSd (2D): 1.2 cm  LVIDd (2D): 5.2 cm  LVIDs (2D): 2.9 cm  LVOT Area (2D): 2.3 cm2  LVPWd (2D): 1.2 cm  RVIDd (2D): 2.2 cm    Tissue Doppler Imaging  LV Peak Early Thomas Tissue Rc; Lateral MA (TDI): 8.1 cm/s  LV Peak Early Ashley Grater Tissue Rc; Medial MA (TDI): 7.6 cm/s    Unspecified Scan Mode  Peak Grad; Mean; Antegrade Flow: 14 mm[Hg]  Vmax; Antegrade Flow: 185 cm/s  LVOT Diam: 1.7 cm  MV Peak Rc/LV Peak Tissue Rc E-Wave; Lateral MA: 10.7  MV Peak Rc/LV Peak Tissue Rc E-Wave; Medial MA: 11.4    Prepared and signed by    Roseanne Yang. Carlos Lazo MD [facility]  Signed 12-Dec-2019 15:46:58         No results found. Assessment and Plan:     Hospital Problems as of 3/24/2020 Date Reviewed: 3/3/2017          Codes Class Noted - Resolved POA    Hypomagnesemia ICD-10-CM: E83.42  ICD-9-CM: 275.2  3/7/2020 - Present Unknown        PFO (patent foramen ovale) ICD-10-CM: Q21.1  ICD-9-CM: 745.5  12/17/2019 - Present Yes        Arrhythmia ICD-10-CM: I49.9  ICD-9-CM: 427.9  12/15/2019 - Present Yes        Hyperlipemia ICD-10-CM: E78.5  ICD-9-CM: 272.4  12/15/2019 - Present Yes        * (Principal) Acute embolic stroke ([facility] [address] DOG-61-SD: I63.9  ICD-9-CM: 434.11  12/12/2019 - Present Yes        Hypertension (Chronic) ICD-10-CM: I10  ICD-9-CM: 401.9  Unknown - Present Yes        Type 2 diabetes mellitus (HCC) (Chronic) ICD-10-CM: E11.9  ICD-9-CM: 250.00  Unknown - Present Yes              Plan:  # Acute embolic CVA   - MRI showed acute to early subacute infarcts involving multiple territories. + PFO on TTE. ASA, statin. - Event monitor on dc    # HypoMg   - Resolved. On daily supp. # HTN   - At target. Con't meds    # DM2   - Glucose good. No meds. - A1C 8.4 12/2019    DC planning/Dispo: Medicaid pending. Diet:  DIET NUTRITIONAL SUPPLEMENTS  DIET DIABETIC CONSISTENT CARB  DVT ppx: Lovenox.     Signed:  Juan Tamayo MD

## 2020-03-24 NOTE — INTERDISCIPLINARY ROUNDS
Interdisciplinary team rounds were held 3/24/2020 with the following team members:  Care Management, Occupational Therapy, Physician and . Plan of care discussed. See clinical pathway and/or care plan for interventions and desired outcomes.

## 2020-03-24 NOTE — PROGRESS NOTES
Neurolinguistic Goals: goals per progress note 3/10/2020  LTG: Patient will increase neuro-linguistic abilities to increase safety and awareness in functional living environment   STG: Patient will solve mathematical problems with 80%accuracy given minimal cueing. Not met 3/10/2020  STG: Patient will name 10 items to  abstract category in 1 minute given  moderate cueing. Progressing 1/31/2020. MET&edited 3/10/2020  STG: Patient will participate in verbal reasoning tasks with 80% accuracy given minimal cueing. Progressing 3/10/2020  STG: Patient will complete mental manipulation tasks with 80% accuracy given minimal cueing. Not met 3/10/2020  STG: Patient will complete working memory/attention tasks with 80% accuracy given minimal cueing. Progressing 3/10/2020  STG: Patient will recall relevant verbal information with 80% accuracy with minimal assistance. Progressing 3/10/2020  STG: Patient will utilize memory compensatory strategies to improve recall of functional list/message with 90%accuracy given minimal assistance. Progressing 3/10/2020     Dysarthria Goals:  LTG: Patient will develop functional and intelligible speech and utilize compensatory strategies to improve communication across environments. STG: Patient will utilize compensatory strategies at conversation level with 90% accuracy independently.  Progressing 3/10/2020      SPEECH LANGUAGE PATHOLOGY: SPEECH-LANGUAGE/COGNITION: Daily Note 3    NAME/AGE/GENDER: Maya Kelley is a 53 y.o. male  DATE: 3/24/2020  PRIMARY DIAGNOSIS: Acute ischemic stroke ([facility] [address] [I63.9]  Cerebrovascular accident (CVA) due to embolism ([facility] [address] [I63.9]      ICD-10: Treatment Diagnosis: R47.1 Dysarthria and Anarthria  R41.841 Cognitive-Communication Deficit    RECOMMENDATIONS   TREATMENT RECOMMENDATIONS:    Continue to follow for cognitive linguistic and dysarthria treatment     STRATEGIES:    Speech strategies: slow rate/over articulate to improve articulatory precision   Memory: repeat and write down novel/relevant information     EDUCATION:  · Recommendations discussed with Patient   Continuation of Skilled Services/Medical Necessity:   Patient is expected to demonstrate progress in expressive communication and cognitive ability to decrease assistance required communication, increase independence with activities of daily living and increase communication with family/caregivers.  Patient continues to require skilled intervention due to cognitive linguistic deficits and ongoing dysarthria      RECOMMENDATIONS for CONTINUED SPEECH THERAPY: YES: Anticipate need for ongoing speech therapy during this hospitalization and at next level of care. ASSESSMENT   Patient with ongoing improvement with utilizing compensatory strategies for short term memory. Reasoning skills grossly intact when completing functional reasoning task. Reduced mental manipulation when completing working memory task. Speech intelligibility at the conversation level continues to improve. Recommend: Ongoing speech therapy targeting speech intelligibility and cognitive linguistic function during this hospitalization and at next level of care. COMPLIANCE WITH PROGRAM/EXERCISES: Compliant most of the time  REHABILITATION POTENTIAL FOR STATED GOALS: Fair  PLAN    FREQUENCY/DURATION: Continue to follow patient 2 times a week for duration of hospital stay to address above goals. - Recommendations for next treatment session: Next treatment will address dysarthria and cognitive linguistic deficits     SUBJECTIVE   Upright in bed. Agreeable to therapy.      Orientation:   Person  Place  Time  Situation     Pain: Pain Scale 1: Numeric (0 - 10)  Pain Intensity 1: 0    OBJECTIVE   Functional memory: recalled 3/3 necessary components to write down to recall upcoming events/appointments (trained in prior session)- time/date/place    Functional reasoning: patient answered reasoning questions in regards to information needed to pay power bill/phone bill by phone with 7/7 accurate    Short term memory/Recalling Directions with 3-4 components: patient instructed to utilize visualization strategy and to attempt to recall important facts (without writing down info) with 14/15 accurate given minimal verbal cues. Mental manipulation/working memory: sequencing 3-4 given words in logical order to generate sentence:  -3 words: 5/5 accurate given minimal verbal cues  -4 words 3/6 accurate given mod verbal cues     Tool Used: MODIFIED BRITT SCALE (mRS)   Score   No Symptoms  [] 0   No significant disability despite symptoms; able to carry out all usual duties and activities  [] 1   Slight disability; unable to carry out all previous activities but able to look after own affairs without assistance. [] 2   Moderate disability; requiring some help but able to walk without assistance  [x] 3   Moderately severe disability; unable to walk without assistance and unable to attend to own bodily needs without assistance  [] 4   Severe disability; bedridden, incontinent, and requiring constant nursing care and attention  [] 5      Score:  Initial: 3 Current: 3   Interpretation of Tool: The Modified George Scale is a 7-point scaled used to quantify level of disability as it relates to a patient's functional abilities. INTERDISCIPLINARY COLLABORATION: Registered Nurse  PRECAUTIONS/ALLERGIES: Patient has no known allergies.      SAFETY:  After treatment position/precautions:  · Supine in bed  · Call light within reach    Total Treatment Duration:     Time In: 1236  Time Out: 76 [facility] [address], [address] [facility]

## 2020-03-25 LAB
CREAT SERPL-MCNC: 1.13 MG/DL (ref 0.8–1.5)
GLUCOSE BLD STRIP.AUTO-MCNC: 119 MG/DL (ref 65–100)
PLATELET # BLD AUTO: 182 K/UL (ref 150–450)

## 2020-03-25 PROCEDURE — 97530 THERAPEUTIC ACTIVITIES: CPT

## 2020-03-25 PROCEDURE — 74011250636 HC RX REV CODE- 250/636: Performed by: INTERNAL MEDICINE

## 2020-03-25 PROCEDURE — 74011250637 HC RX REV CODE- 250/637: Performed by: INTERNAL MEDICINE

## 2020-03-25 PROCEDURE — 97535 SELF CARE MNGMENT TRAINING: CPT

## 2020-03-25 PROCEDURE — 85049 AUTOMATED PLATELET COUNT: CPT

## 2020-03-25 PROCEDURE — 82565 ASSAY OF CREATININE: CPT

## 2020-03-25 PROCEDURE — 82962 GLUCOSE BLOOD TEST: CPT

## 2020-03-25 PROCEDURE — 74011250637 HC RX REV CODE- 250/637: Performed by: HOSPITALIST

## 2020-03-25 PROCEDURE — 65270000029 HC RM PRIVATE

## 2020-03-25 PROCEDURE — 36415 COLL VENOUS BLD VENIPUNCTURE: CPT

## 2020-03-25 RX ADMIN — ACETAMINOPHEN 650 MG: 325 TABLET, FILM COATED ORAL at 21:25

## 2020-03-25 RX ADMIN — ASPIRIN 81 MG 81 MG: 81 TABLET ORAL at 07:49

## 2020-03-25 RX ADMIN — METOPROLOL SUCCINATE 25 MG: 25 TABLET, FILM COATED, EXTENDED RELEASE ORAL at 07:50

## 2020-03-25 RX ADMIN — ENOXAPARIN SODIUM 40 MG: 40 INJECTION SUBCUTANEOUS at 13:52

## 2020-03-25 RX ADMIN — ACETAMINOPHEN 650 MG: 325 TABLET, FILM COATED ORAL at 13:52

## 2020-03-25 RX ADMIN — Medication 400 MG: at 07:49

## 2020-03-25 RX ADMIN — LISINOPRIL 40 MG: 20 TABLET ORAL at 07:50

## 2020-03-25 RX ADMIN — GUAIFENESIN 100 MG: 100 SOLUTION ORAL at 17:16

## 2020-03-25 RX ADMIN — ATORVASTATIN CALCIUM 80 MG: 80 TABLET, FILM COATED ORAL at 21:25

## 2020-03-25 RX ADMIN — DIPHENHYDRAMINE HYDROCHLORIDE 25 MG: 25 CAPSULE ORAL at 17:16

## 2020-03-25 NOTE — PROGRESS NOTES
SPEECH PATHOLOGY NOTE:    Attempted to see patient for ongoing cognitive treatment; however, patient politely declined stating he is nauseous. RN notified.         Babs Sams MS, CCC-SLP

## 2020-03-25 NOTE — PROGRESS NOTES
Hospitalist Note     Admit Date:  2019  9:07 AM   Name:  Samantha Talavera   Age:  53 y.o.  :  1975   MRN:  [id number]   PCP:  Ramya Wiseman MD  Treatment Team: Attending Provider: Senaida Kawasaki, MD; [place] Manager[place] Desmond Dominguez [facility] [place] Utilization Review: John Poole RN; Nurse Practitioner: Saul Solitario NP; Care Manager: Nidhi Nash RN; Charge Nurse: Latasha York; Speech Language Pathologist: Sandeep Perales, HYACINTH; Occupational Therapist: Paul Hernandez; Physical Therapy Assistant: Lester Wood PTA; Primary Nurse: Iram Panchal RN    HPI/Subjective:   Mr. Elvin Farooq is a 52 yo male with PMH of DM, HTN who presented with c/o left sided weakness and left facial droop 19.   CT head showed age indeterminate infarctions within the left corona radiata/caudate.  CTA head/neck showed stenosis at the distal segment of the right vertebral artery and multifocal stenosis in the P2 segment of the left posterior cerebral artery. MRI brain showed acute to early subacute infarcts in the left cerebellar hemisphere, in the periventricular deep left frontoparietal white matter and likely additional areas of restricted diffusion in the right paramedian yariel.   Echo +PFO.  On statin, ASA.  Has been difficult to place in STR. Plans for 4 week event monitor on DC and if no arrhythmia PFO closure recommended. Pt remains stable today. Awaiting placement. Denies complaints.      3/25: Residual left sided weakness.  No other complaints.        Objective:     Patient Vitals for the past 24 hrs:   Temp Pulse Resp BP SpO2   20 1200 97.6 °F (36.4 °C) 82 18 108/79 91 %   20 0800 97.3 °F (36.3 °C) 75 18 121/85 98 %   20 0400 97.7 °F (36.5 °C) 67 18 113/79 97 %   20 0217   16     20 0000 98.1 °F (36.7 °C) 61 20 127/86 100 %   20 2000 98.2 °F (36.8 °C) 62 18 128/76 99 %   03/24/20 1600 98.4 °F (36.9 °C) 73 17 99/68 96 %     Oxygen Therapy  O2 Sat (%): 91 % (03/25/20 1200)  Pulse via Oximetry: 70 beats per minute (03/19/20 2000)  O2 Device: Room air (03/19/20 2000)    Estimated body mass index is 33.89 kg/m² as calculated from the following:    Height as of this encounter: 5' 6\" (1.676 m). Weight as of this encounter: 95.3 kg (210 lb). No intake or output data in the 24 hours ending 03/25/20 1252    *Note that automatically entered I/Os may not be accurate; dependent on patient compliance with collection and accurate  by techs. Physical Exam:   General:     alert, awake, no acute distress. Well nourished  Head:   normocephalic, atraumatic  Eyes, Ears, nose: PERRL, EOMI. Neck:    supple, non-tender  Lungs:   CTAB, no wheezing, rhonchi, rales  Cardiac:   RRR, Normal S1 and S2. No S3, S4 or murmurs. Abdomen:   Soft, non distended, nontender, +BS, no guarding/rebound  Extremities:   Warm, dry. No edema  Skin:   No rashes, no jaundice  Neuro:  Alert and oriented. Mild slurred speech.  Left sided weakness  Psychiatric:  No anxiety, calm, cooperative    Data Review:  I have reviewed all labs, meds, and studies from the last 24 hours:    Recent Results (from the past 24 hour(s))   PLATELET COUNT    Collection Time: 03/25/20  4:26 AM   Result Value Ref Range    PLATELET 619 381 - 539 K/uL   CREATININE    Collection Time: 03/25/20  4:26 AM   Result Value Ref Range    Creatinine 1.13 0.8 - 1.5 MG/DL   GLUCOSE, POC    Collection Time: 03/25/20  6:37 AM   Result Value Ref Range    Glucose (POC) 119 (H) 65 - 100 mg/dL        All Micro Results     None          Current Meds:  Current Facility-Administered Medications   Medication Dose Route Frequency    magnesium oxide (MAG-OX) tablet 400 mg  400 mg Oral DAILY    acetaminophen (TYLENOL) tablet 650 mg  650 mg Oral Q4H PRN    diphenhydrAMINE (BENADRYL) capsule 25 mg  25 mg Oral Q6H PRN    acetaminophen (TYLENOL) tablet 650 mg  650 mg Oral Q8H    lip protectant (BLISTEX) ointment 1 Each  1 Each Topical PRN    metoprolol succinate (TOPROL-XL) XL tablet 25 mg  25 mg Oral DAILY    polyethylene glycol (MIRALAX) packet 17 g  17 g Oral DAILY PRN    guaiFENesin (ROBITUSSIN) 100 mg/5 mL oral liquid 100 mg  100 mg Oral Q4H PRN    hydrALAZINE (APRESOLINE) 20 mg/mL injection 20 mg  20 mg IntraVENous Q4H PRN    atorvastatin (LIPITOR) tablet 80 mg  80 mg Oral QHS    lisinopril (PRINIVIL, ZESTRIL) tablet 40 mg  40 mg Oral DAILY    sodium chloride (NS) flush 5-40 mL  5-40 mL IntraVENous PRN    ondansetron (ZOFRAN) injection 4 mg  4 mg IntraVENous Q4H PRN    aspirin chewable tablet 81 mg  81 mg Oral DAILY    enoxaparin (LOVENOX) injection 40 mg  40 mg SubCUTAneous Q24H    dextrose 40% (GLUTOSE) oral gel 1 Tube  15 g Oral PRN    glucagon (GLUCAGEN) injection 1 mg  1 mg IntraMUSCular PRN    dextrose (D50W) injection syrg 12.5-25 g  25-50 mL IntraVENous PRN       Other Studies:    Mri Brain Wo Cont    Result Date: 12/12/2019  MRI BRAIN WITHOUT CONTRAST 12/12/2019 HISTORY: 78-year-old with hypertension and acute neurological deficit on waking. TECHNIQUE: Sagittal and axial T1-weighted, axial T2-weighted, axial and coronal FLAIR, axial T2-weighted gradient-echo, axial diffusion weighted images with ADC maps of the brain. COMPARISON: Head CT December 12, 2019 FINDINGS: On the diffusion weighted sequence there is a focus of restricted diffusion in the deep left periventricular white matter and corpus callosum posteriorly with associated hyperintense FLAIR signal. There is a focus first of restricted diffusion in the left cerebellar hemisphere posteriorly. There is variable restricted diffusion within the right paramedian yariel. There is variable hyperintense FLAIR signal within the yariel that may be associated with older ischemic events. Old infarcts are present in the thalami, left basal ganglia and left corona radiata. There is no acute intracranial hemorrhage. Specifically there is no thalamic hemorrhage.  On the T2-weighted and FLAIR sequences, there are white matter hyperintensities compatible with chronic small vessel ischemic disease. A subcutaneous nodule in the left frontoparietal scalp is likely a sebaceous cyst. There is no hydrocephalus, intra-axial mass or extra-axial hematoma. IMPRESSION: 1. Acute to early subacute infarcts in the left cerebellar hemisphere, in the periventricular deep left frontoparietal white matter and likely additional areas of restricted diffusion in the right paramedian yariel. 2. Old lacunar infarcts in the corpus striatum and periventricular white matter as well as within the left martinez radiata. Cta Code Neuro Head And Neck W Cont    Result Date: 12/12/2019  History: Code stroke. FINDINGS: CT angiography was performed of the neck and head with contrast and three-dimensional CT angiography reconstruction and reformat was performed. NASCET criteria as needed. CT dose reduction was achieved through use of a standardized protocol tailored for this examination and automatic exposure control for dose modulation. Mild atherosclerosis at the carotid bulbs bilaterally. There is stenosis at the distal intracranial segment of the right vertebral artery. The basilar artery is patent. The anterior cerebral arteries are patent bilaterally. There is multifocal stenosis in the P2 segment of the left posterior cerebral artery. The dural venous sinuses are patent. IMPRESSION: Stenosis at the distal segment of the right vertebral artery and multifocal stenosis in the P2 segment of the left posterior cerebral artery. Ct Code Neuro Head Wo Contrast    Result Date: 12/12/2019  CT head without contrast History: code s.  Hypertension, left-sided facial droop and leg weakness Technique: 5mm axial images were obtained from the skull base to the vertex without intravenous contrast.  Radiation dose reduction techniques were used for this study:  Our CT scanners use one or all of the following: Automated exposure control, adjustment of the mA and/or kVp according to patient's size, iterative reconstruction. Comparison: None Findings: The ventricles and sulci are normal in size and configuration. There are no extra-axial fluid collections. There is no evidence to suggest an acute major territorial infarct. Patchy areas of decreased attenuation are present within the supratentorial white matter most compatible with mild chronic small vessel ischemic changes. Remote lacunar infarctions are present within the left internal capsule and left parietal lobe subcortical white matter. Age indeterminate infarctions are present within the left corona radiata/caudate body. There is a small remote right thalamic lacunar infarction. There is no convincing evidence of acute intraparenchymal hemorrhage or mass effect. Curvilinear region of increased density in the region of the left thalamus is felt to represent partial volume averaging from calcified choroid plexus rather than hemorrhage. The bony calvarium is intact. The visualized mastoid air cells and paranasal sinuses are well pneumatized and aerated. Partially calcified left scalp mass near the vertex of the most compatible with a sebaceous cyst.     Impression: 1. Age indeterminate infarctions within the left corona radiata/caudate. 2. Mild chronic small vessel ischemic changes and areas of remote infarction as described. 3. Probable partial volume averaging the region of the left thalamus rather than intraparenchymal hemorrhage. Results discussed with Dr. Santi Marshall at the time of dictation at 9:20 AM on 12/12/2019. Duplex Lower Ext Venous Bilat    Result Date: 12/13/2019  Bilateral lower extremity venous ultrasound INDICATION:  Pain and swelling, Doppler ultrasound of both lower extremities was performed. FINDINGS:  There is normal flow in the greater saphenous, common femoral, superficial femoral, and popliteal veins. Normal compression and augmentation is demonstrated.  The proximal calf veins are also patent. IMPRESSION: No evidence of deep venous thrombosis in either lower extremity          Assessment and Plan:     Hospital Problems as of 3/25/2020 Date Reviewed: 3/3/2017          Codes Class Noted - Resolved POA    Hypomagnesemia ICD-10-CM: E83.42  ICD-9-CM: 275.2  3/7/2020 - Present Unknown        PFO (patent foramen ovale) ICD-10-CM: Q21.1  ICD-9-CM: 745.5  12/17/2019 - Present Yes        Arrhythmia ICD-10-CM: I49.9  ICD-9-CM: 427.9  12/15/2019 - Present Yes        Hyperlipemia ICD-10-CM: E78.5  ICD-9-CM: 272.4  12/15/2019 - Present Yes        * (Principal) Acute embolic stroke ([facility] [address] [id number]: I63.9  ICD-9-CM: 434.11  12/12/2019 - Present Yes        Hypertension (Chronic) ICD-10-CM: I10  ICD-9-CM: 401.9  Unknown - Present Yes        Type 2 diabetes mellitus (HCC) (Chronic) ICD-10-CM: E11.9  ICD-9-CM: 250.00  Unknown - Present Yes              Plan:  Acute Embolic CVA  - MRI: acute to early subacute infarcts in multiple territories. - +PFO on TTE  - ASA and statin  - c/w PT    Hypertension: c/w meds    T2DM: ISS    Hypomagnesemia: replete and monitor     Diet:  DIET NUTRITIONAL SUPPLEMENTS  DIET DIABETIC CONSISTENT CARB  DVT PPx: lovenox  Code: Full Code    DISPO: medicaid pending    Medical Decision Making:    Labs/Imaging reviewed. Additional information obtained from nursing staff. Patient is moderate risk due to medical condition and comorbidities. Plan discussed with nursing staff. Plan discussed with patient/family. All questions/concerns were addressed. Pt/family agrees with the plan.          Signed By: Tahir Fox MD     March 25, 2020

## 2020-03-25 NOTE — PROGRESS NOTES
Problem: Self Care Deficits Care Plan (Adult)  Goal: *Acute Goals and Plan of Care (Insert Text)  Description    Goals per Re-evaluation on 3/18/2020:   1. Patient will demonstrate appropriate safety awareness and protection of L UE during bed mobility and functional transfers with minimal cues. (Progressing, still needs cues, 3/18/2020)  2. Patient will complete total body bathing and dressing with Min A and adaptive equipment as needed. (Progressing, UB bathing Min A, UB dressing at [address] [place] [address] , LB dressing at [facility], 3/18/2020)  3. Patient will complete weightbearing into the L UE with ADL tasks with minimal assistance to improve ability to use as a functional assist during ADL tasks. (Progressing, 3/18/2020)4. Patient will demonstrate L UE SROM HEP within 7 days. (Progressing, 3/18/2020)  5. Pt will complete bed mobility with Mod I and minimal verbal cueing (Progressing, Supervision, 3/25/2020). 6. Pt will complete dynamic sitting balance for ADLs at mod I with good balance (Progressing, 3/18/2020)  7. Pt will complete functional transfers with Supervision with equipment as needed. (Progressing, SBA 3/18/2020)  8. Pt will complete grooming tasks in standing at sink level x5 mins with good balance and equipment as needed (Progressing, CGA with fair balance 3/25/2020)  9. Pt will complete grooming standing at sink level with SBA and use of adaptive equipment as needed. 10. Pt will don/doff UE sling with SBA and use of minimal verbal and visual cueing for correct application (Progressing, Min A 3/25/2020).     Outcome: Progressing Towards Goal     OCCUPATIONAL THERAPY: Daily Note and PM    3/25/2020  INPATIENT: OT Visit Days: 5  Payor: MEDICAID PENDING / Plan: Chris Sim PENDING / Product Type: Medicaid /      NAME/AGE/GENDER: Maya Kelley is a 53 y.o. male   PRIMARY DIAGNOSIS:  Acute ischemic stroke ([address] [I63.9]  Cerebrovascular accident (CVA) due to embolism ([address] [W56.7] Acute embolic stroke ([address] Acute embolic stroke [facility])       ICD-10: Treatment Diagnosis:    · Generalized Muscle Weakness (M62.81)  · Other lack of cordination (R27.8)  · Hemiplegia and hemiparesis following cerebral infarction affecting   · left non-dominant side (I69.354)  · Abnormal posture (R29.3)   Precautions/Allergies:    NO PULLING ON LUE   LUE in sling with shoulder joint approximated and supported, needs taping   Patient has no known allergies. ASSESSMENT:     Mr. Clovis Jordan presents to the hospital with L sided weakness and acute ischemic CVA. MRI revealed acute to subacute L cerebellar and R paramedian yariel infarcts. 3/25/2020: Pt found supine in bed sleeping upon arrival. Pt awakens very easily to voice and agreeable to work with therapy. Pt performed bed mobility, including transfer from supine to sit with Supervision. Pt demonstrated good static and dynamic seated balance. Pt donned sling on L UE with Min A from therapy for placement of strap around shoulder. Pt able to place and pull strap through loop hole with increased time. Pt completes sit to stand with CGA and use of luke walker. Fair static and dynamic standing balance weightbearing mostly through L LE. Pt walks from bed to sink with CGA to intermittent Min A. Slight loss of balance noted standing at sink and required Min A from therapy, but otherwise pt able to stand at sink with SBA. Pt washed hands/cleaned underneath fingers with use of toothbrush on L hand with Min A from therapy. Pt required Max A to perform on R hand. Pt walked from sink to recliner chair with CGA/intermittent Min A and use of luke walker. Completes stand to sit with CGA and use of luke walker. Pt provided with nail file to perform nail hygiene. Pt required Min A to complete on L hand with assistance to stabilize finger while pt managed nail file. Pt required Max A to complete on R hand. Pt able to grasp nail file in left hand but unable to apply enough pressure to file nail.  Increased time spent on nail hygiene due toincreased growth during acute care stay. Dinner arrived and OT provided pt with Set Up to self-feed. Pt did require Max A to doff cap from protein shake and to remove plastic wrapper from utensils. Pt left seated upright in recliner chair with L UE sling donned and all needs met and within reach at this time Pt is making good progress towards goals. See above. Will continue POC. This section established at most recent assessment   PROBLEM LIST (Impairments causing functional limitations):  1. Decreased Strength  2. Decreased ADL/Functional Activities  3. Decreased Transfer Abilities  4. Decreased Ambulation Ability/Technique  5. Decreased Balance  6. Decreased Activity Tolerance  7. Increased Fatigue  8. Decreased Flexibility/Joint Mobility  9. Decreased Knowledge of Precautions  10. Decreased [place] with Home Exercise Program   INTERVENTIONS PLANNED: (Benefits and precautions of occupational therapy have been discussed with the patient.)  1. Activities of daily living training  2. Adaptive equipment training  3. Balance training  4. Clothing management  5. Cognitive training  6. Donning&doffing training  7. Keanu tech training  8. Neuromuscular re-eduation  9. Therapeutic activity  10. Therapeutic exercise     TREATMENT PLAN: Frequency/Duration: Follow patient 3 times per week to address above goals. Rehabilitation Potential For Stated Goals: Excellent     REHAB RECOMMENDATIONS (at time of discharge pending progress):    Placement: It is my opinion, based on this patient's performance to date, that Mr. Rui Courtney may benefit from intensive therapy at an [address] [place] [address] after discharge due to potential to make ongoing and sustainable functional improvement that is of practical value. Adore Walker Pt functioning far below independent baseline, demonstrating good improvement and participation. Pt would likely benefit greatly and increase independence from inpatient rehab stay. Equipment:    TBD               OCCUPATIONAL PROFILE AND HISTORY:   History of Present Injury/Illness (Reason for Referral):  See H&P  Past Medical History/Comorbidities:   Mr. Krys Grimaldo  has a past medical history of Acute ischemic stroke ([address] (12/12/2019), Acute pancreatitis (11/19/2014), Cerebrovascular accident (CVA) due to embolism ([address] (12/12/2019), Diabetes ([address] (2002), Diabetes ([address] Diabetes mellitus, and Hypertension. He also has no past medical history of Arthritis, Asthma, Autoimmune disease ([address] CAD (coronary artery disease), Cancer ([address] Chronic kidney disease, COPD, Dementia, Dementia ([address] Heart failure ([address] Ill-defined condition, Infectious disease, Liver disease, Other ill-defined conditions(799.89), Psychiatric disorder, PUD (peptic ulcer disease), Seizures ([address] or Sleep disorder. Mr. Krys Grimaldo  has a past surgical history that includes hx hernia repair and hx orthopaedic. Social History/Living Environment:   Home Environment: Apartment  # Steps to Enter: 12  Rails to Enter: Yes  [employer] : Bilateral  One/Two Story Residence: One story  Living Alone: No  Support Systems: Spouse/Significant Other/Partner  Patient Expects to be Discharged to[de-identified] Unknown  Current DME Used/Available at Home: None  Tub or Shower Type: Tub/Shower combination  Prior Level of Function/Work/Activity:  Pt lives at home with his wife. Pt is typically independent with ADL/functional mobility. Pt does not drive. Pt was working part-time at [employer]. Personal Factors:          Age:  [address] y.o.         Past/Current Experience:  CVA with flaccid L side        Other factors that influence how disability is experienced by the patient:  multiple co-morbidities    Number of Personal Factors/Comorbidities that affect the Plan of Care: Extensive review of physical, cognitive, and psychosocial performance (3+):  HIGH COMPLEXITY   ASSESSMENT OF OCCUPATIONAL PERFORMANCE[de-identified]   Activities of Daily Living:   Basic ADLs (From Assessment) Complex ADLs (From Assessment)   Feeding: Setup  Oral Facial Hygiene/Grooming: Minimum assistance  Bathing: Moderate assistance  Upper Body Dressing: Minimum assistance  Lower Body Dressing: Maximum assistance  Toileting: Moderate assistance Instrumental ADL  Meal Preparation: Total assistance  Homemaking: Total assistance  Medication Management: Total assistance  Financial Management: Total assistance   Grooming/Bathing/Dressing Activities of Daily Living   Grooming  Grooming Assistance: Moderate assistance;Maximum assistance  Position Performed: Seated in chair;Standing     Upper Body Bathing  Bathing Assistance: Min A   Position Performed: Seated in chair  Feeding  Feeding Assistance: Set-up  Container Management: Maximum assistance(To open protein shake cap and remove wrapper from utensils)               Lower Body Dressing Assistance  Socks: Total assistance (dependent) Bed/Mat Mobility  Sit to Supine: Supervision  Sit to Stand: Contact guard assistance  Stand to Sit: Contact guard assistance  Bed to Chair: Minimum assistance;Contact guard assistance(Intermittent Min A )  Scooting: Stand-by assistance     Most Recent Physical Functioning:   Gross Assessment:                  Posture:     Balance:  Sitting: Intact  Standing: Impaired; With support  Standing - Static: Good;Constant support  Standing - Dynamic : Fair Bed Mobility:  Sit to Supine: Supervision  Scooting: Stand-by assistance  Wheelchair Mobility:     Transfers:  Sit to Stand: Contact guard assistance  Stand to Sit: Contact guard assistance  Bed to Chair: Minimum assistance;Contact guard assistance(Intermittent Min A )            Patient Vitals for the past 6 hrs:   BP BP Patient Position SpO2 Pulse   03/25/20 1200 108/79 At rest 91 % 82   03/25/20 1600 109/73 At rest 99 % 80       Mental Status  Neurologic State: Alert  Orientation Level: Oriented X4  Cognition: Follows commands  Perception: Verbal, Tactile  Perseveration: Tactile cues provided, Verbal cues provided  Safety/Judgement: Fall prevention                          Physical Skills Involved:  1. Range of Motion  2. Balance  3. Strength  4. Activity Tolerance  5. Fine Motor Control  6. Gross Motor Control Cognitive Skills Affected (resulting in the inability to perform in a timely and safe manner):  1. Perception  2. Expression Psychosocial Skills Affected:  1. Habits/Routines  2. Environmental Adaptation  3. Social Interaction  4. Emotional Regulation  5. Self-Awareness  6. Awareness of Others  7. Social Roles   Number of elements that affect the Plan of Care: 5+:  HIGH COMPLEXITY   CLINICAL DECISION MAKIN[address] [place] [address] AM-PAC 6 Clicks   Daily Activity Inpatient Short Form  How much help from another person does the patient currently need. .. Total A Lot A Little None   1. Putting on and taking off regular lower body clothing? [] 1   [x] 2   [] 3   [] 4   2. Bathing (including washing, rinsing, drying)? [] 1   [x] 2   [] 3   [] 4   3. Toileting, which includes using toilet, bedpan or urinal?   [] 1   [x] 2   [] 3   [] 4   4. Putting on and taking off regular upper body clothing? [] 1   [] 2   [x] 3   [] 4   5. Taking care of personal grooming such as brushing teeth? [] 1   [] 2   [x] 3   [] 4   6. Eating meals? [] 1   [] 2   [x] 3   [] 4   © , Trustees of [address] [place] [address], under license to [employer]. All rights reserved      Score:  Initial: 11 Most Recent: 15 (Date: 3/18/2020 )    Interpretation of Tool:  Represents activities that are increasingly more difficult (i.e. Bed mobility, Transfers, Gait). Medical Necessity:     · Patient demonstrates   · good and excellent  ·  rehab potential due to higher previous functional level. Reason for Services/Other Comments:  · Patient continues to require skilled intervention due to   · Decreased independence with ADL/functional transfers that impacts overall quality of life.    · .   Use of outcome tool(s) and clinical judgement create a POC that gives a: MODERATE COMPLEXITY         TREATMENT:   (In addition to Assessment/Re-Assessment sessions the following treatments were rendered)     Pre-treatment Symptoms/Complaints:  \"I can do this one okay, but need help on this side. \" Pt response in reference to nail care. Pain: Initial:   Pain Intensity 1: 0  Post Session: Unchanged     Self Care: (47 minutes): Procedure(s) utilized to improve and/or restore self-care/home management as related to dressing, bathing, grooming and self feeding. Required minimal to maximal visual, verbal, manual and tactile cueing to facilitate activities of daily living skills. Pt donned sling on L UE with Min A from therapy for placement of strap around shoulder. Pt able to place and pull strap through loop hole with increased time. Pt walks from bed to sink with CGA to intermittent Min A. Slight loss of balance noted standing at sink and required Min A from therapy, but otherwise pt able to stand at sink with SBA. Pt washed hands/cleaned underneath fingers with use of toothbrush of L hand with Min A from therapy. Pt required Max A to perform on R hand. Pt walked from sink to recliner chair with CGA/intermittent Min A and use of luke walker. Pt provided with nail file to perform nail hygiene. Pt required Min A to complete on L hand with assistance to stabilize each finger while pt managed nail file. Pt required Max A to complete on R hand. Pt able to grasp nail file in left hand but unable to apply enough pressure to file nail. Increased time spent on nail hygiene due to increased growth during acute care stay. Pt required Set Up to self-feed. Pt did require Max A to doff cap from protein shake and to remove plastic wrapper from utensils.      Braces/Orthotics/Lines/Etc:   · O2 device: Room air  · LUE sling  · Treatment/Session Assessment:    · Response to Treatment:  Pt making good progress towards goals and is a great participant. · Interdisciplinary Collaboration:   o Occupational Therapist  o Registered Nurse  · After treatment position/precautions:   o Up in chair  o Bed/Chair-wheels locked  o Bed in low position  o Call light within reach  o RN notified  o Posey Chair Alarm    · Compliance with Program/Exercises: Compliant all of the time, Will assess as treatment progresses. · Recommendations/Intent for next treatment session: \"Next visit will focus on advancements to more challenging activities and reduction in assistance provided\".   Total Treatment Duration:   OT Patient Time In/Time Out  Time In: 1555  Time Out: 6632     Mary Fairchild

## 2020-03-25 NOTE — PROGRESS NOTES
Problem: Mobility Impaired (Adult and Pediatric)  Goal: *Acute Goals and Plan of Care  Description  GOALS UPDATED ON RE-ASSESSMENT 3/20/2020 (advancements to goals in bold):  1. Patient will perform bed mobility with supervision and 0 verbal cues within 7 treatment days. 2. Patient will perform transfer bed to chair with SUPERVISION within 7 treatment days. 3. Patient will demonstrate fair+ dynamic balance throughout stance phase on L LE within 7 treatment days. 4. Patient will require STAND BY ASSIST throughout swing phase on (to advance) L LE within 7 treatment days. 5. Patient demonstrate 3+/5 strength in L LE hip flexion, hip abduction, and hip adduction within 7 treatment days. 6. Patient will ambulate 200ft+ with STAND BY ASSIST and least retrictive assistive device within 7 treatment days. 7. Patient will perform wheelchair mobility x75ft with MODIFIED INDEPENDENCE within 7 treatment days. PHYSICAL THERAPY: Daily Note and AM 3/25/2020  INPATIENT: PT Visit Days : 3  Payor: MEDICAID PENDING / Plan: [employer]END[employer] / Product Type: Medicaid /       NAME/AGE/GENDER: Edilberto Dos Santos is a 53 y.o. male   PRIMARY DIAGNOSIS: Acute ischemic stroke ([address] [I63.9]  Cerebrovascular accident (CVA) due to embolism ([address] [I05.6] Acute embolic stroke ([address] Acute embolic stroke ([facility]       ICD-10: Treatment Diagnosis:   · Difficulty in walking, Not elsewhere classified (R26.2)  · Hemiplegia and hemiparesis following cerebral infarction affecting left non-dominant side (E37.951)   Precaution/Allergies:  Patient has no known allergies. ASSESSMENT:     Mr. Romina Orlando is a 53year old admitted R MCA CVA. Patient was sitting up on BSC upon contact and agreeable to PT. Patient transfers to standing with CGA-SBA where he demonstrates good static standing balance during standing ADL activity. Patient then ambulates a total of 250' with 1 sitting rest break.  Patient ambulates 150' with luke walker, CGA (max assist for 1 major LOB needing PT intervention) and wheelchair follow for safety. Patient took a seated rest break then ambulated an additional 100'. Patient needs cues for improved quad/glut activation during stance phase on LLE. Patient returns to EOB and to supine with SBA. Overall good progress towards physical therapy goals. Goals listed above are still appropriate. Will continue efforts as patient is still below functional baseline. This section established at most recent assessment   PROBLEM LIST (Impairments causing functional limitations):  1. Decreased Strength  2. Decreased ADL/Functional Activities  3. Decreased Transfer Abilities  4. Decreased Ambulation Ability/Technique  5. Decreased Balance  6. Increased Pain  7. Decreased Activity Tolerance  8. Increased Fatigue  9. Decreased Flexibility/Joint Mobility   INTERVENTIONS PLANNED: (Benefits and precautions of physical therapy have been discussed with the patient.)  1. Balance Exercise  2. Bed Mobility  3. Family Education  4. Gait Training  5. Home Exercise Program (HEP)  6. Manual Therapy  7. Neuromuscular Re-education/Strengthening  8. Range of Motion (ROM)  9. Therapeutic Activites  10. Therapeutic Exercise/Strengthening  11. Transfer Training     TREATMENT PLAN: Frequency/Duration: 3 times a week for duration of hospital stay  Rehabilitation Potential For Stated Goals: Good     REHAB RECOMMENDATIONS (at time of discharge pending progress):    Placement: It is my opinion, based on this patient's performance to date, that Mr. Anjel Gil may benefit from intensive therapy at an [address] [place] [address] after discharge due to a probable need for close medical supervision by a rehab physician, a probable need for multiple therapy disciplines and potential to make ongoing and sustainable functional improvement that is of practical value. .  Equipment:    TBD pending progress with therapy.              HISTORY:   History of Present Injury/Illness (Reason for Referral):  Per H&P: \"Pt is a 54 y/o smoker with DM, HTN, who presented to ER with L leg and arm weakness, L facial droop, dysarthria. First noted L leg weakness late night 12/11 when he woke up to go to the bathroom. He was normal when he went to bed around 1030. Woke up this morning and had persistent weakness L leg and also now noted in L arm. EMS called. Noted to have slurred speech and L facial droop as well. Code S called in ER around 9am.  MRI with acute infarcts in L cerebellar hemisphere, deep frontoparietal white matter, and R paramedian yariel. Also noted old lacunar infarcts. No large vessel occlusion on CTA, but some stenosis noted. No hx afib, TIA, CVA. No CP, palpitations, SOB. \"  Past Medical History/Comorbidities:   Mr. Papito Ellison  has a past medical history of Acute ischemic stroke ([address] (12/12/2019), Acute pancreatitis (11/19/2014), Cerebrovascular accident (CVA) due to embolism ([address] (12/12/2019), Diabetes ([address] (2002), Diabetes ([address] Diabetes mellitus, and Hypertension. He also has no past medical history of Arthritis, Asthma, Autoimmune disease ([address] CAD (coronary artery disease), Cancer ([address] Chronic kidney disease, COPD, Dementia, Dementia ([address] Heart failure ([address] Ill-defined condition, Infectious disease, Liver disease, Other ill-defined conditions(799.89), Psychiatric disorder, PUD (peptic ulcer disease), Seizures ([address] or Sleep disorder. Mr. Papito Ellison  has a past surgical history that includes hx hernia repair and hx orthopaedic.   Social History/Living Environment:   Home Environment: Apartment  # Steps to Enter: 12  Rails to Enter: Yes  [employer] : Bilateral  One/Two Story Residence: One story  Living Alone: No  Support Systems: Spouse/Significant Other/Partner  Patient Expects to be Discharged to[de-identified] Unknown  Current DME Used/Available at Home: None  Tub or Shower Type: Tub/Shower combination  Prior Level of Function/Work/Activity:  Independent, lives with wife in 2nd story 1 level apartment. No recent falls. Number of Personal Factors/Comorbidities that affect the Plan of Care: 1-2: MODERATE COMPLEXITY   EXAMINATION:   Most Recent Physical Functioning:   Gross Assessment:                  Posture:     Balance:  Sitting: Intact  Standing: Impaired; With support  Standing - Static: Good;Constant support  Standing - Dynamic : Fair Bed Mobility:  Sit to Supine: Supervision  Wheelchair Mobility:     Transfers:  Sit to Stand: Contact guard assistance  Stand to Sit: Contact guard assistance  Gait:     Base of Support: Center of gravity altered;Narrowed  Speed/Clotilde: Slow  Step Length: Left shortened;Right shortened  Gait Abnormalities: Hemiplegic  Distance (ft): 250 Feet (ft)(total with 1 sitting rest break)  Assistive Device: Walker luke  Ambulation - Level of Assistance: Contact guard assistance(+max assist for 1 major LOB requiring PT intervention)  Interventions: Safety awareness training; Tactile cues; Verbal cues      Body Structures Involved:  1. Nerves  2. Voice/Speech  3. Bones  4. Joints  5. Muscles Body Functions Affected:  1. Mental  2. Sensory/Pain  3. Neuromusculoskeletal  4. Movement Related Activities and Participation Affected:  1. General Tasks and Demands  2. Mobility  3. Self Care  4. Interpersonal Interactions and Relationships   Number of elements that affect the Plan of Care: 4+: HIGH COMPLEXITY   CLINICAL PRESENTATION:   Presentation: Evolving clinical presentation with changing clinical characteristics: MODERATE COMPLEXITY   CLINICAL DECISION MAKIN [facility] Inpatient Short Form  How much difficulty does the patient currently have. .. Unable A Lot A Little None   1. Turning over in bed (including adjusting bedclothes, sheets and blankets)? [] 1   [] 2   [] 3   [x] 4   2. Sitting down on and standing up from a chair with arms ( e.g., wheelchair, bedside commode, etc.)   [] 1   [] 2   [x] 3   [] 4   3. Moving from lying on back to sitting on the side of the bed? [] 1   [] 2   [x] 3   [] 4   How much help from another person does the patient currently need. .. Total A Lot A Little None   4. Moving to and from a bed to a chair (including a wheelchair)? [] 1   [] 2   [x] 3   [] 4   5. Need to walk in hospital room? [] 1   [] 2   [x] 3   [] 4   6. Climbing 3-5 steps with a railing? [] 1   [x] 2   [] 3   [] 4   © 2007, Trustees of [address] [place] [address] [address] [id number], under license to [employer]. All rights reserved      Score:  Initial: 13 Most Recent: 18 (Date:3/9/2020)    Interpretation of Tool:  Represents activities that are increasingly more difficult (i.e. Bed mobility, Transfers, Gait). Medical Necessity:     · Patient is expected to demonstrate progress in   · strength, range of motion, balance, coordination, and functional technique  ·  to   · increase independence with all mobility. · .  Reason for Services/Other Comments:  · Patient continues to require skilled intervention due to   · medical complications and mobility deficits which impact his level of function, safety, and independence as indicated above. · .   Use of outcome tool(s) and clinical judgement create a POC that gives a: Questionable prediction of patient's progress: MODERATE COMPLEXITY        TREATMENT:      Pre-treatment Symptoms/Complaints:  none  Pain: Initial: 0/10 Post Session:  0/10     Therapeutic Activity: (    25Minutes): Therapeutic activities including bed mobility training, transfer training, ambulation on level ground, instruction in sequencing with luke walker, scootiing, SPT training, and patient education  to improve mobility, strength and balance. Required moderate Safety awareness training; Tactile cues; Verbal cues to promote static and dynamic balance in standing and promote coordination of bilateral, lower extremity(s).      Braces/Orthotics/Lines/Etc:   · Sling to LUE  Treatment/Session Assessment:    · Response to Treatment:  See above  · Interdisciplinary Collaboration:   o Physical Therapy Assistant  o Registered Nurse  · After treatment position/precautions:   o Supine in bed  o Bed alarm/tab alert on  o Bed/Chair-wheels locked  o Bed in low position  o Call light within reach  o RN notified   · Compliance with Program/Exercises: Compliant all of the time  · Recommendations/Intent for next treatment session: \"Next visit will focus on advancements to more challenging activities and reduction in assistance provided\".     Total Treatment Duration:  PT Patient Time In/Time Out  Time In: 1125  Time Out: 2020 Corey Mg, PTA

## 2020-03-25 NOTE — PROGRESS NOTES
Problem: Patient Education: Go to Patient Education Activity  Goal: Patient/Family Education  Outcome: Progressing Towards Goal     Problem: Pressure Injury - Risk of  Goal: *Prevention of pressure injury  Description: Document Dewey Scale and appropriate interventions in the flowsheet. Outcome: Progressing Towards Goal  Note: Pressure Injury Interventions:  Sensory Interventions: Assess changes in LOC    Moisture Interventions: Absorbent underpads, Limit adult briefs, Minimize layers    Activity Interventions: Pressure redistribution bed/mattress(bed type), Increase time out of bed    Mobility Interventions: Pressure redistribution bed/mattress (bed type), HOB 30 degrees or less    Nutrition Interventions: Offer support with meals,snacks and hydration, Document food/fluid/supplement intake    Friction and Shear Interventions: HOB 30 degrees or less, Minimize layers                Problem: Patient Education: Go to Patient Education Activity  Goal: Patient/Family Education  Outcome: Progressing Towards Goal     Problem: Falls - Risk of  Goal: *Absence of Falls  Description: Document Jeanne Fall Risk and appropriate interventions in the flowsheet.   Outcome: Progressing Towards Goal  Note: Fall Risk Interventions:  Mobility Interventions: Bed/chair exit alarm, Patient to call before getting OOB    Mentation Interventions: Bed/chair exit alarm, Adequate sleep, hydration, pain control    Medication Interventions: Bed/chair exit alarm, Patient to call before getting OOB, Teach patient to arise slowly    Elimination Interventions: Bed/chair exit alarm, Call light in reach, Patient to call for help with toileting needs, Toilet paper/wipes in reach, Urinal in reach    History of Falls Interventions: Bed/chair exit alarm, Investigate reason for fall         Problem: Patient Education: Go to Patient Education Activity  Goal: Patient/Family Education  Outcome: Progressing Towards Goal     Problem: General Medical Care Plan  Goal: *Vital signs within specified parameters  Outcome: Progressing Towards Goal  Goal: *Labs within defined limits  Outcome: Progressing Towards Goal  Goal: *Absence of infection signs and symptoms  Description: Wash hand more often   Outcome: Progressing Towards Goal  Goal: *Optimal pain control at patient's stated goal  Outcome: Progressing Towards Goal  Goal: *Skin integrity maintained  Outcome: Progressing Towards Goal  Goal: *Fluid volume balance  Outcome: Progressing Towards Goal  Goal: *Optimize nutritional status  Outcome: Progressing Towards Goal  Goal: *Anxiety reduced or absent  Outcome: Progressing Towards Goal  Goal: *Progressive mobility and function (eg: ADL's)  Outcome: Progressing Towards Goal     Problem: Patient Education: Go to Patient Education Activity  Goal: Patient/Family Education  Outcome: Progressing Towards Goal     Problem: Patient Education: Go to Patient Education Activity  Goal: Patient/Family Education  Outcome: Progressing Towards Goal     Problem: Pain  Goal: *Control of Pain  Outcome: Progressing Towards Goal     Problem: Patient Education: Go to Patient Education Activity  Goal: Patient/Family Education  Outcome: Progressing Towards Goal

## 2020-03-26 PROCEDURE — 92507 TX SP LANG VOICE COMM INDIV: CPT

## 2020-03-26 PROCEDURE — 74011250637 HC RX REV CODE- 250/637: Performed by: HOSPITALIST

## 2020-03-26 PROCEDURE — 74011250637 HC RX REV CODE- 250/637: Performed by: INTERNAL MEDICINE

## 2020-03-26 PROCEDURE — 97530 THERAPEUTIC ACTIVITIES: CPT

## 2020-03-26 PROCEDURE — 97112 NEUROMUSCULAR REEDUCATION: CPT

## 2020-03-26 PROCEDURE — 65270000029 HC RM PRIVATE

## 2020-03-26 PROCEDURE — 74011250636 HC RX REV CODE- 250/636: Performed by: INTERNAL MEDICINE

## 2020-03-26 RX ADMIN — ENOXAPARIN SODIUM 40 MG: 40 INJECTION SUBCUTANEOUS at 13:40

## 2020-03-26 RX ADMIN — METOPROLOL SUCCINATE 25 MG: 25 TABLET, FILM COATED, EXTENDED RELEASE ORAL at 08:02

## 2020-03-26 RX ADMIN — ACETAMINOPHEN 650 MG: 325 TABLET, FILM COATED ORAL at 13:40

## 2020-03-26 RX ADMIN — LISINOPRIL 40 MG: 20 TABLET ORAL at 08:02

## 2020-03-26 RX ADMIN — ATORVASTATIN CALCIUM 80 MG: 80 TABLET, FILM COATED ORAL at 21:01

## 2020-03-26 RX ADMIN — GUAIFENESIN 100 MG: 100 SOLUTION ORAL at 19:35

## 2020-03-26 RX ADMIN — ASPIRIN 81 MG 81 MG: 81 TABLET ORAL at 08:02

## 2020-03-26 RX ADMIN — ACETAMINOPHEN 650 MG: 325 TABLET, FILM COATED ORAL at 21:01

## 2020-03-26 RX ADMIN — ACETAMINOPHEN 650 MG: 325 TABLET, FILM COATED ORAL at 08:05

## 2020-03-26 RX ADMIN — DIPHENHYDRAMINE HYDROCHLORIDE 25 MG: 25 CAPSULE ORAL at 19:35

## 2020-03-26 RX ADMIN — Medication 400 MG: at 08:02

## 2020-03-26 NOTE — PROGRESS NOTES
Problem: Self Care Deficits Care Plan (Adult)  Goal: *Acute Goals and Plan of Care (Insert Text)  Description    Goals per Re-evaluation on 3/18/2020:   1. Patient will demonstrate appropriate safety awareness and protection of L UE during bed mobility and functional transfers with minimal cues. (Progressing, still needs cues, 3/18/2020)  2. Patient will complete total body bathing and dressing with Min A and adaptive equipment as needed. (Progressing, UB bathing Min A, UB dressing at [address] [place] [address] , LB dressing at [facility], 3/18/2020)  3. Patient will complete weightbearing into the L UE with ADL tasks with minimal assistance to improve ability to use as a functional assist during ADL tasks. (Progressing, 3/18/2020)4. Patient will demonstrate L UE SROM HEP within 7 days. (Progressing, 3/18/2020)  5. Pt will complete bed mobility with Mod I and minimal verbal cueing (Progressing, Supervision, 3/25/2020). 6. Pt will complete dynamic sitting balance for ADLs at mod I with good balance (Progressing, 3/18/2020)  7. Pt will complete functional transfers with Supervision with equipment as needed. (Progressing, SBA 3/18/2020)  8. Pt will complete grooming tasks in standing at sink level x5 mins with good balance and equipment as needed (Progressing, CGA with fair balance 3/25/2020)  9. Pt will complete grooming standing at sink level with SBA and use of adaptive equipment as needed. 10. Pt will don/doff UE sling with SBA and use of minimal verbal and visual cueing for correct application (Progressing, Min A 3/25/2020).     Outcome: Progressing Towards Goal     OCCUPATIONAL THERAPY: Daily Note and PM    3/26/2020  INPATIENT: OT Visit Days: 6  Payor: MEDICAID PENDING / Plan: Irina Casas PENDING / Product Type: Medicaid /      NAME/AGE/GENDER: Gerri Orellana is a 53 y.o. male   PRIMARY DIAGNOSIS:  Acute ischemic stroke ([address] [I63.9]  Cerebrovascular accident (CVA) due to embolism ([address] [T17.2] Acute embolic stroke ([address] Acute embolic stroke [facility])       ICD-10: Treatment Diagnosis:    · Generalized Muscle Weakness (M62.81)  · Other lack of cordination (R27.8)  · Hemiplegia and hemiparesis following cerebral infarction affecting   · left non-dominant side (I69.354)  · Abnormal posture (R29.3)   Precautions/Allergies:    NO PULLING ON LUE   LUE in sling with shoulder joint approximated and supported, needs taping   Patient has no known allergies. ASSESSMENT:     Mr. Romina Orlando presents to the hospital with L sided weakness and acute ischemic CVA. MRI revealed acute to subacute L cerebellar and R paramedian yariel infarcts. 3/26/2020: Pt was supine in the bed upon arrival. Pt presents with flat affect but is very cooperative with all activity. Pt worked on focusing on improving midline orientation in sitting with equal distrubution through B hips. Pt focused on improving weightbearing throughout session through L LE and L UE with functional transfers and standing. Pt with tends to slide the L foot along the floor with functional mobility. Pt encouraged for better weight shifting with taking steps. Pt also worked on improving functional use of the L UE with assistance at the scapula for improved mobility. Pt has limited mobility in the scapula at this time which is impacting his shoulder motion. Pt worked on scapular retraction as well as protraction with guided reaching with support at elbow. Pt reports no pain with UE activity. Pt worked on gross grasp and release with washcloths with facilitation at wrist for flexion/extension. Pt reports tightness in the L wrist with inability to bear weight through L hand due to pain. Pt tolerated session well overall. Pt returned back supine at then end of the session with pt requiring cues to bend L LE as well and use it with bridging and scooting. Pt to continue per plan of care.      This section established at most recent assessment   PROBLEM LIST (Impairments causing functional limitations):  1. Decreased Strength  2. Decreased ADL/Functional Activities  3. Decreased Transfer Abilities  4. Decreased Ambulation Ability/Technique  5. Decreased Balance  6. Decreased Activity Tolerance  7. Increased Fatigue  8. Decreased Flexibility/Joint Mobility  9. Decreased Knowledge of Precautions  10. Decreased [place] with Home Exercise Program   INTERVENTIONS PLANNED: (Benefits and precautions of occupational therapy have been discussed with the patient.)  1. Activities of daily living training  2. Adaptive equipment training  3. Balance training  4. Clothing management  5. Cognitive training  6. Donning&doffing training  7. Keanu tech training  8. Neuromuscular re-eduation  9. Therapeutic activity  10. Therapeutic exercise     TREATMENT PLAN: Frequency/Duration: Follow patient 3 times per week to address above goals. Rehabilitation Potential For Stated Goals: Excellent     REHAB RECOMMENDATIONS (at time of discharge pending progress):    Placement: It is my opinion, based on this patient's performance to date, that Mr. Annie Sykes may benefit from intensive therapy at an [address] [place] [address] after discharge due to potential to make ongoing and sustainable functional improvement that is of practical value. [facility] Pt functioning far below independent baseline, demonstrating good improvement and participation. Pt would likely benefit greatly and increase independence from inpatient rehab stay. Equipment:    TBD               OCCUPATIONAL PROFILE AND HISTORY:   History of Present Injury/Illness (Reason for Referral):  See H&P  Past Medical History/Comorbidities:   Mr. Annie Sykes  has a past medical history of Acute ischemic stroke ([address] (12/12/2019), Acute pancreatitis (11/19/2014), Cerebrovascular accident (CVA) due to embolism ([address] (12/12/2019), Diabetes ([address] (2002), Diabetes ([address] Diabetes mellitus, and Hypertension.  He also has no past medical history of Arthritis, Asthma, Autoimmune disease ([facility] CAD (coronary artery disease), Cancer ([facility] Chronic kidney disease, COPD, Dementia, Dementia ([facility] Heart failure ([facility] Ill-defined condition, Infectious disease, Liver disease, Other ill-defined conditions(799.89), Psychiatric disorder, PUD (peptic ulcer disease), Seizures ([facility] or Sleep disorder. Mr. Anoop Myers  has a past surgical history that includes hx hernia repair and hx orthopaedic. Social History/Living Environment:   Home Environment: Apartment  # Steps to Enter: 12  Rails to Enter: Yes  [employer] : Bilateral  One/Two Story Residence: One story  Living Alone: No  Support Systems: Spouse/Significant Other/Partner  Patient Expects to be Discharged to[de-identified] Unknown  Current DME Used/Available at Home: None  Tub or Shower Type: Tub/Shower combination  Prior Level of Function/Work/Activity:  Pt lives at home with his wife. Pt is typically independent with ADL/functional mobility. Pt does not drive. Pt was working part-time at [employer]. Personal Factors:          Age:  53 y.o. Past/Current Experience:  CVA with flaccid L side        Other factors that influence how disability is experienced by the patient:  multiple co-morbidities    Number of Personal Factors/Comorbidities that affect the Plan of Care: Extensive review of physical, cognitive, and psychosocial performance (3+):  HIGH COMPLEXITY   ASSESSMENT OF OCCUPATIONAL PERFORMANCE[de-identified]   Activities of Daily Living:   Basic ADLs (From Assessment) Complex ADLs (From Assessment)   Feeding: Setup  Oral Facial Hygiene/Grooming: Minimum assistance  Bathing: Moderate assistance  Upper Body Dressing: Minimum assistance  Lower Body Dressing: Maximum assistance  Toileting: Moderate assistance Instrumental ADL  Meal Preparation: Total assistance  Homemaking:  Total assistance  Medication Management: Total assistance  Financial Management: Total assistance   Grooming/Bathing/Dressing Activities of Daily Living     Cognitive Retraining  Safety/Judgement: Fall prevention   Upper Body Bathing  Bathing Assistance: Min A   Position Performed: Seated in chair                  Lower Body Dressing Assistance  Socks: Total assistance (dependent) Bed/Mat Mobility  Supine to Sit: Contact guard assistance  Sit to Supine: Supervision  Sit to Stand: Contact guard assistance  Stand to Sit: Contact guard assistance  Bed to Chair: Contact guard assistance  Scooting: Moderate assistance(to fully get L side of hip to edge of the bed)     Most Recent Physical Functioning:   Gross Assessment:  AROM: (R UE WFL; L UE non-functional AROM)  PROM: Generally decreased, functional(L UE)  Strength: (R UE WFL; L UE non-functional; flaccid)  Coordination: (non-functional in the L UE)  Tone: Abnormal(hypotonic in L UE)  Sensation: Intact(reports intact grossly to light touch)               Posture:     Balance:  Sitting: Intact  Standing: Impaired  Standing - Static: Good  Standing - Dynamic : Fair Bed Mobility:  Supine to Sit: Contact guard assistance  Sit to Supine: Supervision  Scooting: Moderate assistance(to fully get L side of hip to edge of the bed)  Level of Assistance: Stand-by assistance  Interventions: Safety awareness training;Verbal cues; Visual cues  Wheelchair Mobility:     Transfers:  Sit to Stand: Contact guard assistance  Stand to Sit: Contact guard assistance  Bed to Chair: Contact guard assistance  Interventions: Safety awareness training;Manual cues; Verbal cues; Visual cues  Duration: 38 Minutes            Patient Vitals for the past 6 hrs:   BP BP Patient Position SpO2 Pulse   03/26/20 1125 112/76 At rest 97 % 69   03/26/20 1527 138/72 Sitting 99 % 67       Mental Status  Neurologic State: Alert  Orientation Level: Oriented X4  Cognition: Follows commands  Perception: Cues to attend to left side of body, Cues to maintain midline in sitting, Cues to maintain midline in standing, Tactile, Verbal, Visual  Perseveration: No perseveration noted  Safety/Judgement: Fall prevention                          Physical Skills Involved:  1. Range of Motion  2. Balance  3. Strength  4. Activity Tolerance  5. Fine Motor Control  6. Gross Motor Control Cognitive Skills Affected (resulting in the inability to perform in a timely and safe manner):  1. Perception  2. Expression Psychosocial Skills Affected:  1. Habits/Routines  2. Environmental Adaptation  3. Social Interaction  4. Emotional Regulation  5. Self-Awareness  6. Awareness of Others  7. Social Roles   Number of elements that affect the Plan of Care: 5+:  HIGH COMPLEXITY   CLINICAL DECISION MAKIN[address] [place] [address] AM-PAC 6 Clicks   Daily Activity Inpatient Short Form  How much help from another person does the patient currently need. .. Total A Lot A Little None   1. Putting on and taking off regular lower body clothing? [] 1   [x] 2   [] 3   [] 4   2. Bathing (including washing, rinsing, drying)? [] 1   [x] 2   [] 3   [] 4   3. Toileting, which includes using toilet, bedpan or urinal?   [] 1   [x] 2   [] 3   [] 4   4. Putting on and taking off regular upper body clothing? [] 1   [] 2   [x] 3   [] 4   5. Taking care of personal grooming such as brushing teeth? [] 1   [] 2   [x] 3   [] 4   6. Eating meals? [] 1   [] 2   [x] 3   [] 4   © , Trustees of [address] [place] [address], under license to [employer]. All rights reserved      Score:  Initial: 11 Most Recent: 15 (Date: 3/18/2020 )    Interpretation of Tool:  Represents activities that are increasingly more difficult (i.e. Bed mobility, Transfers, Gait). Medical Necessity:     · Patient demonstrates   · good and excellent  ·  rehab potential due to higher previous functional level. Reason for Services/Other Comments:  · Patient continues to require skilled intervention due to   · Decreased independence with ADL/functional transfers that impacts overall quality of life.    · .   Use of outcome tool(s) and clinical judgement create a POC that gives a: MODERATE COMPLEXITY         TREATMENT:   (In addition to Assessment/Re-Assessment sessions the following treatments were rendered)     Pre-treatment Symptoms/Complaints:  \"I can do this one okay, but need help on this side. \" Pt response in reference to nail care. Pain: Initial:   Pain Intensity 1: 0  Post Session: Unchanged     Neuromuscular Re-education: (58 minutes):  Exercise/activities per grid below to improve balance, coordination, posture and proprioception. Required maximal visual, verbal, manual and tactile cues to promote coordination of left, upper extremity(s) and promote motor control of left, upper extremity(s). Date:  3/26/20   Activity/Exercise Parameters   Functional mobility  CGA with functional mobility with minimal facilitation for weight shifts    Sit to squat  3 sets x 10 reps with facilitation through the L distal femur as well through L UE to improve midline sit to stand;  Pt also needs cues for improve trunk flexion    Weightbearing in the L UE Pt leans into the L elbow and works on pushing back to midline; 10 reps     Scapular retraction  10 reps x 2; facilitation of L scapula    Scapular protraction  Forward reach with facilitation at the L scapula 1 set x 10 reps    Scapular Rotation  10 reps x 1 set with facilitation at the L scapula    Forward reach w/ cane  10 reps with L hand gripping cane working on pushing forwards and slowly pulling back; maximal faclitation    External Rotation w/ cane  10 reps with L hand gripping cane and moving into shoulder internal/external rotation with minimal facilitation (elbow adducted at side)   B UE elbow flexion holding ball  Maximal facilitation for gripping ball in the L hand and facilitating into flexion promoting bi-manual task ; 1 set x 10 reps; additional to reps pushing out into extension    Grasp/release  Mod-maximal facilitation with gripping and releasing washcloth ball with cues for wrist flexion/extension; pt completed with additional time and 10 reps        Braces/Orthotics/Lines/Etc:   · O2 device: Room air  · LUE sling  · Treatment/Session Assessment:    · Response to Treatment:  Pt making good progress towards goals and is a great participant. · Interdisciplinary Collaboration:   o Occupational Therapist  o Registered Nurse  · After treatment position/precautions:   o Supine in bed  o Bed/Chair-wheels locked  o Bed in low position  o Call light within reach  o RN notified   · Compliance with Program/Exercises: Compliant all of the time, Will assess as treatment progresses. · Recommendations/Intent for next treatment session: \"Next visit will focus on advancements to more challenging activities and reduction in assistance provided\".   Total Treatment Duration:   OT Patient Time In/Time Out  Time In: 6607  Time Out: [address], OT

## 2020-03-26 NOTE — PROGRESS NOTES
Hospitalist Note     Admit Date:  2019  9:07 AM   Name:  Ray Nevarez   Age:  53 y.o.  :  1975   MRN:  [id number]   PCP:  Deng Rodas MD  Treatment Team: Attending Provider: Oliver Hanks MD; Care Manager: [facility], [facility]; Utilization Review: Dari Rivera RN; Nurse Practitioner: Lee Cr NP; Care Manager: Yani Ahmadi, RN; Charge Nurse: Deng Tariq; Primary Nurse: Angel Richards RN; Speech Language Pathologist: Ladonna Delarosa Primary Nurse: Iris Carrillo, JERRY; Charge Nurse: Dat Espinosa; Physical Therapist: Shawna Diaz, PT, DPT    HPI/Subjective:   Mr. Antony Murray is a 54 yo male with PMH of DM, HTN who presented with c/o left sided weakness and left facial droop 19.   CT head showed age indeterminate infarctions within the left corona radiata/caudate.  CTA head/neck showed stenosis at the distal segment of the right vertebral artery and multifocal stenosis in the P2 segment of the left posterior cerebral artery. MRI brain showed acute to early subacute infarcts in the left cerebellar hemisphere, in the periventricular deep left frontoparietal white matter and likely additional areas of restricted diffusion in the right paramedian yariel.   Echo +PFO.  On statin, ASA.  Has been difficult to place in STR. Plans for 4 week event monitor on DC and if no arrhythmia PFO closure recommended. Pt remains stable today. Awaiting placement.       3/26: Residual left sided weakness. Had some episodes of nausea yesterday but resolved since.  No complaints today    Objective:     Patient Vitals for the past 24 hrs:   Temp Pulse Resp BP SpO2   20 0400 97.7 °F (36.5 °C) 70 18 107/73 98 %   20 2000 98.2 °F (36.8 °C) 74 18 114/77 98 %   20 1600 98.1 °F (36.7 °C) 80 18 109/73 99 %   20 1200 97.6 °F (36.4 °C) 82 18 108/79 91 %   20 0800 97.3 °F (36.3 °C) 75 18 121/85 98 %     Oxygen Therapy  O2 Sat (%): 98 % (03/26/20 0400)  Pulse via Oximetry: 70 beats per minute (03/19/20 2000)  O2 Device: Room air (03/19/20 2000)    Estimated body mass index is 33.89 kg/m² as calculated from the following:    Height as of this encounter: 5' 6\" (1.676 m). Weight as of this encounter: 95.3 kg (210 lb). No intake or output data in the 24 hours ending 03/26/20 0734    *Note that automatically entered I/Os may not be accurate; dependent on patient compliance with collection and accurate  by techs. Physical Exam:   General:     alert, awake, no acute distress. Well nourished  Head:   normocephalic, atraumatic  Eyes, Ears, nose: PERRL, EOMI. Neck:    supple, non-tender  Lungs:   CTAB, no wheezing, rhonchi, rales  Cardiac:   RRR, Normal S1 and S2. No S3, S4 or murmurs. Abdomen:   Soft, non distended, nontender, +BS, no guarding/rebound  Extremities:   Warm, dry. No edema  Skin:   No rashes, no jaundice  Neuro:  Alert and oriented. Mild slurred speech. Left sided weakness  Psychiatric:  No anxiety, calm, cooperative    Data Review:  I have reviewed all labs, meds, and studies from the last 24 hours:    No results found for this or any previous visit (from the past 24 hour(s)).      All Micro Results     None          Current Meds:  Current Facility-Administered Medications   Medication Dose Route Frequency    magnesium oxide (MAG-OX) tablet 400 mg  400 mg Oral DAILY    acetaminophen (TYLENOL) tablet 650 mg  650 mg Oral Q4H PRN    diphenhydrAMINE (BENADRYL) capsule 25 mg  25 mg Oral Q6H PRN    acetaminophen (TYLENOL) tablet 650 mg  650 mg Oral Q8H    lip protectant (BLISTEX) ointment 1 Each  1 Each Topical PRN    metoprolol succinate (TOPROL-XL) XL tablet 25 mg  25 mg Oral DAILY    polyethylene glycol (MIRALAX) packet 17 g  17 g Oral DAILY PRN    guaiFENesin (ROBITUSSIN) 100 mg/5 mL oral liquid 100 mg  100 mg Oral Q4H PRN    hydrALAZINE (APRESOLINE) 20 mg/mL injection 20 mg  20 mg IntraVENous Q4H PRN    atorvastatin (LIPITOR) tablet 80 mg 80 mg Oral QHS    lisinopril (PRINIVIL, ZESTRIL) tablet 40 mg  40 mg Oral DAILY    sodium chloride (NS) flush 5-40 mL  5-40 mL IntraVENous PRN    ondansetron (ZOFRAN) injection 4 mg  4 mg IntraVENous Q4H PRN    aspirin chewable tablet 81 mg  81 mg Oral DAILY    enoxaparin (LOVENOX) injection 40 mg  40 mg SubCUTAneous Q24H    dextrose 40% (GLUTOSE) oral gel 1 Tube  15 g Oral PRN    glucagon (GLUCAGEN) injection 1 mg  1 mg IntraMUSCular PRN    dextrose (D50W) injection syrg 12.5-25 g  25-50 mL IntraVENous PRN       Other Studies:    Mri Brain Wo Cont    Result Date: 12/12/2019  MRI BRAIN WITHOUT CONTRAST 12/12/2019 HISTORY: 53-year-old with hypertension and acute neurological deficit on waking. TECHNIQUE: Sagittal and axial T1-weighted, axial T2-weighted, axial and coronal FLAIR, axial T2-weighted gradient-echo, axial diffusion weighted images with ADC maps of the brain. COMPARISON: Head CT December 12, 2019 FINDINGS: On the diffusion weighted sequence there is a focus of restricted diffusion in the deep left periventricular white matter and corpus callosum posteriorly with associated hyperintense FLAIR signal. There is a focus first of restricted diffusion in the left cerebellar hemisphere posteriorly. There is variable restricted diffusion within the right paramedian yariel. There is variable hyperintense FLAIR signal within the yariel that may be associated with older ischemic events. Old infarcts are present in the thalami, left basal ganglia and left corona radiata. There is no acute intracranial hemorrhage. Specifically there is no thalamic hemorrhage. On the T2-weighted and FLAIR sequences, there are white matter hyperintensities compatible with chronic small vessel ischemic disease. A subcutaneous nodule in the left frontoparietal scalp is likely a sebaceous cyst. There is no hydrocephalus, intra-axial mass or extra-axial hematoma. IMPRESSION: 1.  Acute to early subacute infarcts in the left cerebellar hemisphere, in the periventricular deep left frontoparietal white matter and likely additional areas of restricted diffusion in the right paramedian yariel. 2. Old lacunar infarcts in the corpus striatum and periventricular white matter as well as within the left martinez radiata. Cta Code Neuro Head And Neck W Cont    Result Date: 12/12/2019  History: Code stroke. FINDINGS: CT angiography was performed of the neck and head with contrast and three-dimensional CT angiography reconstruction and reformat was performed. NASCET criteria as needed. CT dose reduction was achieved through use of a standardized protocol tailored for this examination and automatic exposure control for dose modulation. Mild atherosclerosis at the carotid bulbs bilaterally. There is stenosis at the distal intracranial segment of the right vertebral artery. The basilar artery is patent. The anterior cerebral arteries are patent bilaterally. There is multifocal stenosis in the P2 segment of the left posterior cerebral artery. The dural venous sinuses are patent. IMPRESSION: Stenosis at the distal segment of the right vertebral artery and multifocal stenosis in the P2 segment of the left posterior cerebral artery. Ct Code Neuro Head Wo Contrast    Result Date: 12/12/2019  CT head without contrast History: code s. Hypertension, left-sided facial droop and leg weakness Technique: 5mm axial images were obtained from the skull base to the vertex without intravenous contrast.  Radiation dose reduction techniques were used for this study:  Our CT scanners use one or all of the following: Automated exposure control, adjustment of the mA and/or kVp according to patient's size, iterative reconstruction. Comparison: None Findings: The ventricles and sulci are normal in size and configuration. There are no extra-axial fluid collections. There is no evidence to suggest an acute major territorial infarct.  Patchy areas of decreased attenuation are present within the supratentorial white matter most compatible with mild chronic small vessel ischemic changes. Remote lacunar infarctions are present within the left internal capsule and left parietal lobe subcortical white matter. Age indeterminate infarctions are present within the left corona radiata/caudate body. There is a small remote right thalamic lacunar infarction. There is no convincing evidence of acute intraparenchymal hemorrhage or mass effect. Curvilinear region of increased density in the region of the left thalamus is felt to represent partial volume averaging from calcified choroid plexus rather than hemorrhage. The bony calvarium is intact. The visualized mastoid air cells and paranasal sinuses are well pneumatized and aerated. Partially calcified left scalp mass near the vertex of the most compatible with a sebaceous cyst.     Impression: 1. Age indeterminate infarctions within the left corona radiata/caudate. 2. Mild chronic small vessel ischemic changes and areas of remote infarction as described. 3. Probable partial volume averaging the region of the left thalamus rather than intraparenchymal hemorrhage. Results discussed with Dr. Hector Kim at the time of dictation at 9:20 AM on 12/12/2019. Duplex Lower Ext Venous Bilat    Result Date: 12/13/2019  Bilateral lower extremity venous ultrasound INDICATION:  Pain and swelling, Doppler ultrasound of both lower extremities was performed. FINDINGS:  There is normal flow in the greater saphenous, common femoral, superficial femoral, and popliteal veins. Normal compression and augmentation is demonstrated. The proximal calf veins are also patent.      IMPRESSION: No evidence of deep venous thrombosis in either lower extremity          Assessment and Plan:     Hospital Problems as of 3/26/2020 Date Reviewed: 3/3/2017          Codes Class Noted - Resolved POA    Hypomagnesemia ICD-10-CM: U03.03  ICD-9-CM: 275.2  3/7/2020 - Present Unknown        PFO (patent foramen ovale) ICD-10-CM: Q21.1  ICD-9-CM: 745.5  12/17/2019 - Present Yes        Arrhythmia ICD-10-CM: I49.9  ICD-9-CM: 427.9  12/15/2019 - Present Yes        Hyperlipemia ICD-10-CM: E78.5  ICD-9-CM: 272.4  12/15/2019 - Present Yes        * (Principal) Acute embolic stroke [facility]) HCM-70-JK: I63.9  ICD-9-CM: 434.11  12/12/2019 - Present Yes        Hypertension (Chronic) ICD-10-CM: I10  ICD-9-CM: 401.9  Unknown - Present Yes        Type 2 diabetes mellitus (HCC) (Chronic) ICD-10-CM: E11.9  ICD-9-CM: 250.00  Unknown - Present Yes              Plan:  Acute Embolic CVA  - MRI: acute to early subacute infarcts in multiple territories. - +PFO on TTE  - ASA and statin  - c/w PT    Hypertension: c/w meds    T2DM: ISS    Hypomagnesemia: replete and monitor     Diet:  DIET NUTRITIONAL SUPPLEMENTS  DIET DIABETIC CONSISTENT CARB  DVT PPx: lovenox  Code: Full Code    DISPO: medicaid pending    Medical Decision Making:    Labs/Imaging reviewed. Additional information obtained from nursing staff. Patient is moderate risk due to medical condition and comorbidities. Plan discussed with nursing staff. Plan discussed with patient/family. All questions/concerns were addressed. Pt/family agrees with the plan.          Signed By: Pato Tijerina MD     March 26, 2020

## 2020-03-26 NOTE — PROGRESS NOTES
Problem: Mobility Impaired (Adult and Pediatric)  Goal: *Acute Goals and Plan of Care  Description  GOALS UPDATED ON RE-ASSESSMENT 3/20/2020 (advancements to goals in bold):  1. Patient will perform bed mobility with supervision and 0 verbal cues within 7 treatment days. 2. Patient will perform transfer bed to chair with SUPERVISION within 7 treatment days. 3. Patient will demonstrate fair+ dynamic balance throughout stance phase on L LE within 7 treatment days. 4. Patient will require STAND BY ASSIST throughout swing phase on (to advance) L LE within 7 treatment days. 5. Patient demonstrate 3+/5 strength in L LE hip flexion, hip abduction, and hip adduction within 7 treatment days. 6. Patient will ambulate 200ft+ with STAND BY ASSIST and least retrictive assistive device within 7 treatment days. 7. Patient will perform wheelchair mobility x75ft with MODIFIED INDEPENDENCE within 7 treatment days. PHYSICAL THERAPY: Daily Note and PM 3/26/2020  INPATIENT: PT Visit Days : 4  Payor: MEDICAID PENDING / Plan: [employer] PENDING / Product Type: Medicaid /       NAME/AGE/GENDER: Samantha Talavera is a 53 y.o. male   PRIMARY DIAGNOSIS: Acute ischemic stroke ([facility] [address] [I63.9]  Cerebrovascular accident (CVA) due to embolism ([address] [B32.3] Acute embolic stroke ([address] Acute embolic stroke ([facility] [address]       ICD-10: Treatment Diagnosis:   · Difficulty in walking, Not elsewhere classified (R26.2)  · Hemiplegia and hemiparesis following cerebral infarction affecting left non-dominant side (B48.068)   Precaution/Allergies:  Patient has no known allergies. ASSESSMENT:     Mr. Elvin Farooq is a 53year old admitted R MCA CVA. Patient supine in bed and agreeable to physical therapy treatment. He required CGA to transfer to edge of bed, stood with CGA and ambulated 76' with luke walker. Continues to require cues to decrease step size with RLE due to increased LLE knee hyperextension with extended step size RLE.  During his seated rest break, wrapped L ankle in dorsiflexion/eversion with ACE wrap and donned RLE shoe. He ambulated an additional 180' with luke walker and wheelchair follow for safety. Patient needs cues for improved quad/glut activation during stance phase on LLE. Patient returns to EOB and to supine with SBA. Overall good progress towards physical therapy goals. Goals listed above are still appropriate. Will continue efforts as patient is still below functional baseline. This section established at most recent assessment   PROBLEM LIST (Impairments causing functional limitations):  1. Decreased Strength  2. Decreased ADL/Functional Activities  3. Decreased Transfer Abilities  4. Decreased Ambulation Ability/Technique  5. Decreased Balance  6. Increased Pain  7. Decreased Activity Tolerance  8. Increased Fatigue  9. Decreased Flexibility/Joint Mobility   INTERVENTIONS PLANNED: (Benefits and precautions of physical therapy have been discussed with the patient.)  1. Balance Exercise  2. Bed Mobility  3. Family Education  4. Gait Training  5. Home Exercise Program (HEP)  6. Manual Therapy  7. Neuromuscular Re-education/Strengthening  8. Range of Motion (ROM)  9. Therapeutic Activites  10. Therapeutic Exercise/Strengthening  11. Transfer Training     TREATMENT PLAN: Frequency/Duration: 3 times a week for duration of hospital stay  Rehabilitation Potential For Stated Goals: Good     REHAB RECOMMENDATIONS (at time of discharge pending progress):    Placement: It is my opinion, based on this patient's performance to date, that Mr. Sherine Joshi may benefit from intensive therapy at an [address] [place] [address] after discharge due to a probable need for close medical supervision by a rehab physician, a probable need for multiple therapy disciplines and potential to make ongoing and sustainable functional improvement that is of practical value. .  Equipment:    TBD pending progress with therapy.              HISTORY:   History of Present Injury/Illness (Reason for Referral):  Per H&P: \"Pt is a 52 y/o smoker with DM, HTN, who presented to ER with L leg and arm weakness, L facial droop, dysarthria. First noted L leg weakness late night 12/11 when he woke up to go to the bathroom. He was normal when he went to bed around 1030. Woke up this morning and had persistent weakness L leg and also now noted in L arm. EMS called. Noted to have slurred speech and L facial droop as well. Code S called in ER around 9am.  MRI with acute infarcts in L cerebellar hemisphere, deep frontoparietal white matter, and R paramedian yariel. Also noted old lacunar infarcts. No large vessel occlusion on CTA, but some stenosis noted. No hx afib, TIA, CVA. No CP, palpitations, SOB. \"  Past Medical History/Comorbidities:   Mr. Romina Orlando  has a past medical history of Acute ischemic stroke ([address] (12/12/2019), Acute pancreatitis (11/19/2014), Cerebrovascular accident (CVA) due to embolism ([address] (12/12/2019), Diabetes ([address] (2002), Diabetes ([address] Diabetes mellitus, and Hypertension. He also has no past medical history of Arthritis, Asthma, Autoimmune disease ([address] CAD (coronary artery disease), Cancer ([address] Chronic kidney disease, COPD, Dementia, Dementia ([address] Heart failure ([address] Ill-defined condition, Infectious disease, Liver disease, Other ill-defined conditions(799.89), Psychiatric disorder, PUD (peptic ulcer disease), Seizures ([address] or Sleep disorder. Mr. Romina Orlando  has a past surgical history that includes hx hernia repair and hx orthopaedic.   Social History/Living Environment:   Home Environment: Apartment  # Steps to Enter: 12  Rails to Enter: Yes  [employer] : Bilateral  One/Two Story Residence: One story  Living Alone: No  Support Systems: Spouse/Significant Other/Partner  Patient Expects to be Discharged to[de-identified] Unknown  Current DME Used/Available at Home: None  Tub or Shower Type: Tub/Shower combination  Prior Level of Function/Work/Activity:  Independent, lives with wife in 2nd story 1 level apartment. No recent falls. Number of Personal Factors/Comorbidities that affect the Plan of Care: 1-2: MODERATE COMPLEXITY   EXAMINATION:   Most Recent Physical Functioning:   Gross Assessment:                  Posture:     Balance:  Sitting: Intact  Standing: Impaired  Standing - Static: Good  Standing - Dynamic : Fair Bed Mobility:  Supine to Sit: Contact guard assistance  Sit to Supine: Supervision  Scooting: Stand-by assistance  Level of Assistance: Stand-by assistance  Interventions: Safety awareness training;Verbal cues; Visual cues  Wheelchair Mobility:     Transfers:  Sit to Stand: Contact guard assistance  Stand to Sit: Contact guard assistance  Interventions: Safety awareness training;Manual cues; Verbal cues; Visual cues  Duration: 38 Minutes  Gait:     Base of Support: Center of gravity altered;Narrowed  Speed/Clotilde: Slow  Step Length: Right shortened;Left shortened  Gait Abnormalities: Hemiplegic  Distance (ft): 260 Feet (ft)(100', 160')  Assistive Device: Walker luke;Gait belt(sling applied to LUE)  Ambulation - Level of Assistance: Contact guard assistance  Interventions: Safety awareness training;Verbal cues; Visual/Demos      Body Structures Involved:  1. Nerves  2. Voice/Speech  3. Bones  4. Joints  5. Muscles Body Functions Affected:  1. Mental  2. Sensory/Pain  3. Neuromusculoskeletal  4. Movement Related Activities and Participation Affected:  1. General Tasks and Demands  2. Mobility  3. Self Care  4. Interpersonal Interactions and Relationships   Number of elements that affect the Plan of Care: 4+: HIGH COMPLEXITY   CLINICAL PRESENTATION:   Presentation: Evolving clinical presentation with changing clinical characteristics: MODERATE COMPLEXITY   CLINICAL DECISION MAKIN [facility] Inpatient Short Form  How much difficulty does the patient currently have. ..  Unable A Lot A Little None   1. Turning over in bed (including adjusting bedclothes, sheets and blankets)? [] 1   [] 2   [] 3   [x] 4   2. Sitting down on and standing up from a chair with arms ( e.g., wheelchair, bedside commode, etc.)   [] 1   [] 2   [x] 3   [] 4   3. Moving from lying on back to sitting on the side of the bed? [] 1   [] 2   [x] 3   [] 4   How much help from another person does the patient currently need. .. Total A Lot A Little None   4. Moving to and from a bed to a chair (including a wheelchair)? [] 1   [] 2   [x] 3   [] 4   5. Need to walk in hospital room? [] 1   [] 2   [x] 3   [] 4   6. Climbing 3-5 steps with a railing? [] 1   [x] 2   [] 3   [] 4   © 2007, Trustees of [address] [place] [address], under license to [employer]. All rights reserved      Score:  Initial: 13 Most Recent: 18 (Date:3/9/2020)    Interpretation of Tool:  Represents activities that are increasingly more difficult (i.e. Bed mobility, Transfers, Gait). Medical Necessity:     · Patient is expected to demonstrate progress in   · strength, range of motion, balance, coordination, and functional technique  ·  to   · increase independence with all mobility. · .  Reason for Services/Other Comments:  · Patient continues to require skilled intervention due to   · medical complications and mobility deficits which impact his level of function, safety, and independence as indicated above. · .   Use of outcome tool(s) and clinical judgement create a POC that gives a: Questionable prediction of patient's progress: MODERATE COMPLEXITY        TREATMENT:      Pre-treatment Symptoms/Complaints:  none  Pain: Initial: 0/10 Post Session:  0/10     Therapeutic Activity: (  38 Minutes): Therapeutic activities including bed mobility training, transfer training, ambulation on level ground, instruction in sequencing with luke walker, scootiing, SPT training, and patient education  to improve mobility, strength and balance.   Required moderate Safety awareness training;Verbal cues; Visual/Demos to promote static and dynamic balance in standing and promote coordination of bilateral, lower extremity(s). Braces/Orthotics/Lines/Etc:   · Sling to LUE  Treatment/Session Assessment:    · Response to Treatment:  See above  · Interdisciplinary Collaboration:   o Physical Therapist  o Registered Nurse  · After treatment position/precautions:   o Supine in bed  o Bed alarm/tab alert on  o Bed/Chair-wheels locked  o Bed in low position  o Call light within reach  o RN notified   · Compliance with Program/Exercises: Compliant all of the time  · Recommendations/Intent for next treatment session: \"Next visit will focus on advancements to more challenging activities and reduction in assistance provided\".     Total Treatment Duration:  PT Patient Time In/Time Out  Time In: 1352  Time Out: [id number], PT, DPT

## 2020-03-26 NOTE — PROGRESS NOTES
Neurolinguistic Goals: goals per progress note 3/10/2020  LTG: Patient will increase neuro-linguistic abilities to increase safety and awareness in functional living environment   STG: Patient will solve mathematical problems with 80%accuracy given minimal cueing. Not met 3/10/2020  STG: Patient will name 10 items to  abstract category in 1 minute given  moderate cueing. Progressing 1/31/2020. MET&edited 3/10/2020  STG: Patient will participate in verbal reasoning tasks with 80% accuracy given minimal cueing. Progressing 3/10/2020  STG: Patient will complete mental manipulation tasks with 80% accuracy given minimal cueing. Not met 3/10/2020  STG: Patient will complete working memory/attention tasks with 80% accuracy given minimal cueing. Progressing 3/10/2020  STG: Patient will recall relevant verbal information with 80% accuracy with minimal assistance. Progressing 3/10/2020  STG: Patient will utilize memory compensatory strategies to improve recall of functional list/message with 90%accuracy given minimal assistance. Progressing 3/10/2020     Dysarthria Goals:  LTG: Patient will develop functional and intelligible speech and utilize compensatory strategies to improve communication across environments. STG: Patient will utilize compensatory strategies at conversation level with 90% accuracy independently.  Progressing 3/10/2020      SPEECH LANGUAGE PATHOLOGY: SPEECH-LANGUAGE/COGNITION: Daily Note 4    NAME/AGE/GENDER: Yo Lind is a 53 y.o. male  DATE: 3/26/2020  PRIMARY DIAGNOSIS: Acute ischemic stroke ([facility] [address] [I63.9]  Cerebrovascular accident (CVA) due to embolism ([facility] [address] [I63.9]      ICD-10: Treatment Diagnosis: R47.1 Dysarthria and Anarthria  R41.841 Cognitive-Communication Deficit    RECOMMENDATIONS   TREATMENT RECOMMENDATIONS:    Continue to follow for cognitive linguistic treatment     STRATEGIES:    Speech strategies: slow rate/over articulate to improve articulatory precision   Memory strategies: repeat and write down novel/relevant information     EDUCATION:  · Recommendations discussed with Patient   Continuation of Skilled Services/Medical Necessity:   Patient is expected to demonstrate progress in expressive communication and cognitive ability to decrease assistance required communication, increase independence with activities of daily living and increase communication with family/caregivers.  Patient continues to require skilled intervention due to cognitive linguistic deficits and ongoing dysarthria      RECOMMENDATIONS for CONTINUED SPEECH THERAPY: YES: Anticipate need for ongoing speech therapy during this hospitalization and at next level of care. ASSESSMENT   Patient with decreased attention and working memory across mental manipulation tasks. Breakdown with increased cognitive load, 4 components. Recommend ongoing speech therapy services targeting speech intelligibility and cognitive linguistic function during this hospitalization and at next level of care. COMPLIANCE WITH PROGRAM/EXERCISES: Compliant most of the time  REHABILITATION POTENTIAL FOR STATED GOALS: Fair  PLAN    FREQUENCY/DURATION: Continue to follow patient 2 times a week for duration of hospital stay to address above goals. - Recommendations for next treatment session: Next treatment will address cognitive linguistic abilities     SUBJECTIVE   Upright in bed. Wanting remote fixed. RN notified engineering.      Orientation:   Person  Place  Time  Situation     Pain: Pain Scale 1: Numeric (0 - 10)  Pain Intensity 1: 0    OBJECTIVE   Mental manipulation targeting attention and working memory  Verbal sequencing-   3 words: 6/6  4 words: 1/5, 3/5 with moderate verbal cueing and repetition     Basic calculations: 3/5, 4/5 with min cues      Tool Used: MODIFIED BRITT SCALE (mRS)   Score   No Symptoms  [] 0   No significant disability despite symptoms; able to carry out all usual duties and activities  [] 1   Slight disability; unable to carry out all previous activities but able to look after own affairs without assistance. [] 2   Moderate disability; requiring some help but able to walk without assistance  [x] 3   Moderately severe disability; unable to walk without assistance and unable to attend to own bodily needs without assistance  [] 4   Severe disability; bedridden, incontinent, and requiring constant nursing care and attention  [] 5      Score:  Initial: 3 Current: 3   Interpretation of Tool: The Modified Lovejoy Scale is a 7-point scaled used to quantify level of disability as it relates to a patient's functional abilities. INTERDISCIPLINARY COLLABORATION: Registered Nurse  PRECAUTIONS/ALLERGIES: Patient has no known allergies.      SAFETY:  After treatment position/precautions:  · Supine in bed  · Call light within reach    Total Treatment Duration:     Time In: 1301  Time Out: 615 [facility] [address] [address] [facility]

## 2020-03-26 NOTE — DISCHARGE INSTRUCTIONS
Stroke: After Your Visit     Your Care Instructions     You have had a stroke. Risk factors for stroke include being overweight, smoking, and sedentary lifestyle. This means that the blood flow to a part of your brain was blocked for some time, which damages the nerve cells in that part of the brain. The part of your body controlled by that part of your brain may not function properly now. The brain is an amazing organ that can heal itself to some degree. The stroke you had damaged part of your brain, but other parts of your brain may take over in some way for the damaged areas. You have already started this process. Going home may be hard for you and your family. The more you can try to do for yourself, the better. Remember to take each day one at a time. Follow-up care is a key part of your treatment and safety. Be sure to make and go to all appointments, and call your doctor if you are having problems. Its also a good idea to know your test results and keep a list of the medicines you take. How can you care for yourself at home? Enter a stroke rehabilitation (rehab) program, if your doctor recommends it. Physical, speech, and occupational therapies can help you manage bathing, dressing, eating, and other basics of daily living. Eat a heart-healthy diet that is low in cholesterol, saturated fat, and salt. Eat lots of fresh fruits and vegetables and foods high in fiber. Increase your activities slowly. Take short rest breaks when you get tired. Gradually increase the amount you walk. Start out by walking a little more than you did the day before. Do not drive until your doctor says it is okay. It is normal to feel sad or depressed after a stroke. If the blues last, talk to your doctor. If you are having problems with urine leakage, go to the bathroom at regular times, including when you first wake up and at bedtime. Also, limit fluids after dinner.   If you are constipated, drink plenty of fluids, enough so that your urine is light yellow or clear like water. If you have kidney, heart, or liver disease and have to limit fluids, talk with your doctor before you increase the amount of fluids you drink. Set up a regular time for using the toilet. If you continue to have constipation, your doctor may suggest using a bulking agent, such as Metamucil, or a stool softener, laxative, or enema. Medicines  Take your medicines exactly as prescribed. Call your doctor if you think you are having a problem with your medicine. You may be taking several medicines. ACE (angiotensin-converting enzyme) inhibitors, angiotensin II receptor blockers (ARBs), beta-blockers, diuretics (water pills), and calcium channel blockers control your blood pressure. Statins help lower cholesterol. Your doctor may also prescribe medicines for depression, pain, sleep problems, anxiety, or agitation. If your doctor has given you medicine that prevents blood clots, such as warfarin (Coumadin), aspirin combined with extended-release dipyridamole (Aggrenox), clopidogrel (Plavix), or aspirin to prevent another stroke, you should:  Tell your dentist, pharmacist, and other health professionals that you take these medicines. Watch for unusual bruising or bleeding, such as blood in your urine, red or black stools, or bleeding from your nose or gums. Get regular blood tests to check your clotting time if you are taking Coumadin. Wear medical alert jewelry that says you take blood thinners. You can buy this at most drugstores. Do not take any over-the-counter medicines or herbal products without talking to your doctor first.  If you take birth control pills or hormone replacement therapy, talk to your doctor about whether they are right for you. For family members and caregivers  Make the home safe. Set up a room so that your loved one does not have to climb stairs. Be sure the bathroom is on the same floor.  Move throw rugs and furniture that could cause falls, and make sure that the lighting is good. Put grab bars and seats in tubs and showers. Find out what your loved one can do and what he or she needs help with. Try not to do things for your loved one that your loved one can do on his or her own. Help him or her learn and practice new skills. Visit and talk with your loved one often. Try doing activities together that you both enjoy, such as playing cards or board games. Keep in touch with your loved one's friends as much as you can, and encourage them to visit. Take care of yourself. Do not try to do everything yourself. Ask other family members to help. Eat well, get enough rest, and take time to do things that you enjoy. Keep up with your own doctor visits, and make sure to take your medicines regularly. Get out of the house as much as you can. Join a local support group. Find out if you qualify for home health care visits to help with rehab or for adult day care. When should you call for help? Call 911 anytime you think you may need emergency care. For example, call if:  You have signs of another stroke. These may include:  Sudden numbness, paralysis, or weakness in your face, arm, or leg, especially on only one side of your body. New problems with walking or balance. Sudden vision changes. Drooling or slurred speech. New problems speaking or understanding simple statements, or you feel confused. A sudden, severe headache that is different from past headaches. Call 911 even if these symptoms go away in a few minutes. You cough up blood. You vomit blood or what looks like coffee grounds. You pass maroon or very bloody stools. Call your doctor now or seek immediate medical care if:  You have new bruises or blood spots under your skin. You have a nosebleed. Your gums bleed when you brush your teeth. You have blood in your urine. Your stools are black and tarlike or have streaks of blood.   You have vaginal bleeding when you are not having your period, or heavy period bleeding. You have new symptoms that may be related to your stroke, such as falls or trouble swallowing. Watch closely for changes in your health, and be sure to contact your doctor if you have any problems. Where can you learn more? Go to what3words.be    Enter C294  in the search box to learn more about \"Stroke: After Your Visit\". © 0218-2568 [employer]. Care instructions adapted under license by [facility] (which disclaims liability or warranty for this information). This care instruction is for use with your licensed healthcare professional. If you have questions about a medical condition or this instruction, always ask your healthcare professional. Imelda Graves any warranty or liability for your use of this information.

## 2020-03-26 NOTE — PROGRESS NOTES
Problem: Patient Education: Go to Patient Education Activity  Goal: Patient/Family Education  Outcome: Progressing Towards Goal     Problem: Pressure Injury - Risk of  Goal: *Prevention of pressure injury  Description: Document Dewey Scale and appropriate interventions in the flowsheet. Outcome: Progressing Towards Goal  Note: Pressure Injury Interventions:  Sensory Interventions: Assess changes in LOC    Moisture Interventions: Absorbent underpads, Apply protective barrier, creams and emollients, Check for incontinence Q2 hours and as needed, Limit adult briefs, Maintain skin hydration (lotion/cream), Minimize layers, Offer toileting Q_hr, Moisture barrier    Activity Interventions: Increase time out of bed, Pressure redistribution bed/mattress(bed type), PT/OT evaluation    Mobility Interventions: HOB 30 degrees or less, Pressure redistribution bed/mattress (bed type), PT/OT evaluation, Turn and reposition approx. every two hours(pillow and wedges)    Nutrition Interventions: Document food/fluid/supplement intake    Friction and Shear Interventions: HOB 30 degrees or less, Minimize layers                Problem: Patient Education: Go to Patient Education Activity  Goal: Patient/Family Education  Outcome: Progressing Towards Goal     Problem: Falls - Risk of  Goal: *Absence of Falls  Description: Document Jeanne Fall Risk and appropriate interventions in the flowsheet.   Outcome: Progressing Towards Goal  Note: Fall Risk Interventions:  Mobility Interventions: Bed/chair exit alarm, Communicate number of staff needed for ambulation/transfer, Patient to call before getting OOB, Utilize walker, cane, or other assistive device    Mentation Interventions: Bed/chair exit alarm, Adequate sleep, hydration, pain control    Medication Interventions: Bed/chair exit alarm, Patient to call before getting OOB, Teach patient to arise slowly    Elimination Interventions: Bed/chair exit alarm, Call light in reach, Patient to call for help with toileting needs, Toilet paper/wipes in reach, Toileting schedule/hourly rounds, Urinal in reach    History of Falls Interventions: Bed/chair exit alarm, Door open when patient unattended         Problem: Patient Education: Go to Patient Education Activity  Goal: Patient/Family Education  Outcome: Progressing Towards Goal     Problem: General Medical Care Plan  Goal: *Vital signs within specified parameters  Outcome: Progressing Towards Goal  Goal: *Labs within defined limits  Outcome: Progressing Towards Goal  Goal: *Absence of infection signs and symptoms  Description: Wash hand more often   Outcome: Progressing Towards Goal  Goal: *Optimal pain control at patient's stated goal  Outcome: Progressing Towards Goal  Goal: *Skin integrity maintained  Outcome: Progressing Towards Goal  Goal: *Fluid volume balance  Outcome: Progressing Towards Goal  Goal: *Optimize nutritional status  Outcome: Progressing Towards Goal  Goal: *Anxiety reduced or absent  Outcome: Progressing Towards Goal  Goal: *Progressive mobility and function (eg: ADL's)  Outcome: Progressing Towards Goal     Problem: Patient Education: Go to Patient Education Activity  Goal: Patient/Family Education  Outcome: Progressing Towards Goal     Problem: Patient Education: Go to Patient Education Activity  Goal: Patient/Family Education  Outcome: Progressing Towards Goal     Problem: Patient Education: Go to Patient Education Activity  Goal: Patient/Family Education  Outcome: Progressing Towards Goal     Problem: Patient Education: Go to Patient Education Activity  Goal: Patient/Family Education  Outcome: Progressing Towards Goal     Problem: Pain  Goal: *Control of Pain  Outcome: Progressing Towards Goal     Problem: Patient Education: Go to Patient Education Activity  Goal: Patient/Family Education  Outcome: Progressing Towards Goal

## 2020-03-27 PROCEDURE — 74011250637 HC RX REV CODE- 250/637: Performed by: INTERNAL MEDICINE

## 2020-03-27 PROCEDURE — 74011250637 HC RX REV CODE- 250/637: Performed by: HOSPITALIST

## 2020-03-27 PROCEDURE — 74011250636 HC RX REV CODE- 250/636: Performed by: INTERNAL MEDICINE

## 2020-03-27 PROCEDURE — 97530 THERAPEUTIC ACTIVITIES: CPT

## 2020-03-27 PROCEDURE — 65270000029 HC RM PRIVATE

## 2020-03-27 PROCEDURE — A4565 SLINGS: HCPCS

## 2020-03-27 PROCEDURE — 97168 OT RE-EVAL EST PLAN CARE: CPT

## 2020-03-27 PROCEDURE — 97535 SELF CARE MNGMENT TRAINING: CPT

## 2020-03-27 RX ADMIN — Medication 400 MG: at 08:24

## 2020-03-27 RX ADMIN — DIPHENHYDRAMINE HYDROCHLORIDE 25 MG: 25 CAPSULE ORAL at 17:40

## 2020-03-27 RX ADMIN — ACETAMINOPHEN 650 MG: 325 TABLET, FILM COATED ORAL at 20:37

## 2020-03-27 RX ADMIN — ENOXAPARIN SODIUM 40 MG: 40 INJECTION SUBCUTANEOUS at 15:05

## 2020-03-27 RX ADMIN — ACETAMINOPHEN 650 MG: 325 TABLET, FILM COATED ORAL at 15:05

## 2020-03-27 RX ADMIN — ACETAMINOPHEN 650 MG: 325 TABLET, FILM COATED ORAL at 05:16

## 2020-03-27 RX ADMIN — METOPROLOL SUCCINATE 25 MG: 25 TABLET, FILM COATED, EXTENDED RELEASE ORAL at 08:24

## 2020-03-27 RX ADMIN — LISINOPRIL 40 MG: 20 TABLET ORAL at 08:24

## 2020-03-27 RX ADMIN — ASPIRIN 81 MG 81 MG: 81 TABLET ORAL at 08:24

## 2020-03-27 RX ADMIN — ATORVASTATIN CALCIUM 80 MG: 80 TABLET, FILM COATED ORAL at 20:37

## 2020-03-27 RX ADMIN — GUAIFENESIN 100 MG: 100 SOLUTION ORAL at 17:41

## 2020-03-27 NOTE — PROGRESS NOTES
Problem: Pressure Injury - Risk of  Goal: *Prevention of pressure injury  Description: Document Dewey Scale and appropriate interventions in the flowsheet. Outcome: Progressing Towards Goal  Note: Pressure Injury Interventions:  Sensory Interventions: Assess changes in LOC    Moisture Interventions: Absorbent underpads, Apply protective barrier, creams and emollients, Check for incontinence Q2 hours and as needed, Limit adult briefs    Activity Interventions: Increase time out of bed, Pressure redistribution bed/mattress(bed type), PT/OT evaluation    Mobility Interventions: HOB 30 degrees or less, Pressure redistribution bed/mattress (bed type), PT/OT evaluation, Suspension boots    Nutrition Interventions: Document food/fluid/supplement intake    Friction and Shear Interventions: HOB 30 degrees or less, Minimize layers                Problem: Patient Education: Go to Patient Education Activity  Goal: Patient/Family Education  Outcome: Progressing Towards Goal     Problem: Falls - Risk of  Goal: *Absence of Falls  Description: Document Jeanne Fall Risk and appropriate interventions in the flowsheet.   Outcome: Progressing Towards Goal  Note: Fall Risk Interventions:  Mobility Interventions: Bed/chair exit alarm, OT consult for ADLs, Patient to call before getting OOB, PT Consult for mobility concerns    Mentation Interventions: Bed/chair exit alarm, Adequate sleep, hydration, pain control    Medication Interventions: Bed/chair exit alarm, Evaluate medications/consider consulting pharmacy, Patient to call before getting OOB, Teach patient to arise slowly    Elimination Interventions: Call light in reach, Bed/chair exit alarm, Patient to call for help with toileting needs, Stay With Me (per policy)    History of Falls Interventions: Bed/chair exit alarm, Door open when patient unattended, Investigate reason for fall         Problem: Patient Education: Go to Patient Education Activity  Goal: Patient/Family Education  Outcome: Progressing Towards Goal     Problem: General Medical Care Plan  Goal: *Vital signs within specified parameters  Outcome: Progressing Towards Goal  Goal: *Labs within defined limits  Outcome: Progressing Towards Goal  Goal: *Absence of infection signs and symptoms  Description: Wash hand more often   Outcome: Progressing Towards Goal  Goal: *Optimal pain control at patient's stated goal  Outcome: Progressing Towards Goal  Goal: *Skin integrity maintained  Outcome: Progressing Towards Goal  Goal: *Fluid volume balance  Outcome: Progressing Towards Goal  Goal: *Optimize nutritional status  Outcome: Progressing Towards Goal  Goal: *Anxiety reduced or absent  Outcome: Progressing Towards Goal  Goal: *Progressive mobility and function (eg: ADL's)  Outcome: Progressing Towards Goal     Problem: Patient Education: Go to Patient Education Activity  Goal: Patient/Family Education  Outcome: Progressing Towards Goal     Problem: Patient Education: Go to Patient Education Activity  Goal: Patient/Family Education  Outcome: Progressing Towards Goal     Problem: Ischemic Stroke: Discharge Outcomes  Goal: *Verbalizes anxiety and depression are reduced or absent  Outcome: Progressing Towards Goal  Goal: *Verbalize understanding of risk factor modification(Stroke Metric)  Outcome: Progressing Towards Goal  Goal: *Hemodynamically stable  Outcome: Progressing Towards Goal  Goal: *Absence of aspiration pneumonia  Outcome: Progressing Towards Goal  Goal: *Aware of needed dietary changes  Outcome: Progressing Towards Goal  Goal: *Verbalize understanding of prescribed medications including anti-coagulants, anti-lipid, and/or anti-platelets(Stroke Metric)  Outcome: Progressing Towards Goal  Goal: *Tolerating diet  Outcome: Progressing Towards Goal  Goal: *Aware of follow-up diagnostics related to anticoagulants  Outcome: Progressing Towards Goal  Goal: *Ability to perform ADLs and demonstrates progressive mobility and function  Outcome: Progressing Towards Goal  Goal: *Absence of DVT(Stroke Metric)  Outcome: Progressing Towards Goal  Goal: *Absence of aspiration  Outcome: Progressing Towards Goal  Goal: *Optimal pain control at patient's stated goal  Outcome: Progressing Towards Goal  Goal: *Home safety concerns addressed  Outcome: Progressing Towards Goal  Goal: *Describes available resources and support systems  Outcome: Progressing Towards Goal  Goal: *Verbalizes understanding of activation of EMS(911) for stroke symptoms(Stroke Metric)  Outcome: Progressing Towards Goal  Goal: *Understands and describes signs and symptoms to report to providers(Stroke Metric)  Outcome: Progressing Towards Goal  Goal: *Neurolgocially stable (absence of additional neurological deficits)  Outcome: Progressing Towards Goal  Goal: *Verbalizes importance of follow-up with primary care physician(Stroke Metric)  Outcome: Progressing Towards Goal  Goal: *Smoking cessation discussed,if applicable(Stroke Metric)  Outcome: Progressing Towards Goal  Goal: *Depression screening completed(Stroke Metric)  Outcome: Progressing Towards Goal     Problem: Pain  Goal: *Control of Pain  Outcome: Progressing Towards Goal     Problem: Patient Education: Go to Patient Education Activity  Goal: Patient/Family Education  Outcome: Progressing Towards Goal

## 2020-03-27 NOTE — PROGRESS NOTES
Brief visit with patient  He is doing good  In good spirits    Gomez Holloway, staff [address], [address]  /   [email or web address]

## 2020-03-27 NOTE — PROGRESS NOTES
Hospitalist Note     Admit Date:  2019  9:07 AM   Name:  Ray Nevarez   Age:  53 y.o.  :  1975   MRN:  [id number]   PCP:  Deng Rodas MD  Treatment Team: Attending Provider: Oliver Hanks MD; Care Manager: Sharmin John, [facility] [place]; Utilization Review: Dari Rivera RN; Nurse Practitioner: Lee Cr NP; Care Manager: Yani Ahmadi RN; Charge Nurse: Dat Espinosa; Physical Therapist: Beronica Sullivan DPT; Occupational Therapist: Ashutosh Velázquez    HPI/Subjective:   Mr. Antony Murray is a 54 yo male with PMH of DM, HTN who presented with c/o left sided weakness and left facial droop 19.   CT head showed age indeterminate infarctions within the left corona radiata/caudate.  CTA head/neck showed stenosis at the distal segment of the right vertebral artery and multifocal stenosis in the P2 segment of the left posterior cerebral artery. MRI brain showed acute to early subacute infarcts in the left cerebellar hemisphere, in the periventricular deep left frontoparietal white matter and likely additional areas of restricted diffusion in the right paramedian yariel.   Echo +PFO.  On statin, ASA.  Has been difficult to place in STR. Plans for 4 week event monitor on DC and if no arrhythmia PFO closure recommended. Pt remains stable today. Awaiting placement.       3/27: Residual left sided weakness. No issues yesterday evening or overnight. Reports no nausea. Currently without any acute complaints.      Objective:     Patient Vitals for the past 24 hrs:   Temp Pulse Resp BP SpO2   20 0800 98.8 °F (37.1 °C) 84 17 139/88 95 %   20 0400 97.9 °F (36.6 °C) 69 16 127/87 99 %   20 0000 98.4 °F (36.9 °C) 65 15 113/81 95 %   20 2000 98.4 °F (36.9 °C) 62 14 116/74 98 %   20 1527 99.2 °F (37.3 °C) 67 18 138/72 99 %   20 1125 98.3 °F (36.8 °C) 69 17 112/76 97 %     Oxygen Therapy  O2 Sat (%): 95 % (20 0800)  Pulse via Oximetry: 62 beats per minute (20 2000)  O2 Device: Room air (03/26/20 2000)    Estimated body mass index is 33.89 kg/m² as calculated from the following:    Height as of this encounter: 5' 6\" (1.676 m). Weight as of this encounter: 95.3 kg (210 lb). Intake/Output Summary (Last 24 hours) at 3/27/2020 0841  Last data filed at 3/26/2020 1701  Gross per 24 hour   Intake 240 ml   Output    Net 240 ml       *Note that automatically entered I/Os may not be accurate; dependent on patient compliance with collection and accurate  by techs. Physical Exam:   General:     alert, awake, no acute distress. Well nourished  Head:   normocephalic, atraumatic  Eyes, Ears, nose: PERRL, EOMI. Neck:    supple, non-tender  Lungs:   CTAB, no wheezing, rhonchi, rales  Cardiac:   RRR, Normal S1 and S2. No S3, S4 or murmurs. Abdomen:   Soft, non distended, nontender, +BS, no guarding/rebound  Extremities:   Warm, dry. No edema  Skin:   No rashes, no jaundice  Neuro:  Alert and oriented. Mild slurred speech. Left sided weakness  Psychiatric:  No anxiety, calm, cooperative    Data Review:  I have reviewed all labs, meds, and studies from the last 24 hours:    No results found for this or any previous visit (from the past 24 hour(s)).      All Micro Results     None          Current Meds:  Current Facility-Administered Medications   Medication Dose Route Frequency    magnesium oxide (MAG-OX) tablet 400 mg  400 mg Oral DAILY    acetaminophen (TYLENOL) tablet 650 mg  650 mg Oral Q4H PRN    diphenhydrAMINE (BENADRYL) capsule 25 mg  25 mg Oral Q6H PRN    acetaminophen (TYLENOL) tablet 650 mg  650 mg Oral Q8H    lip protectant (BLISTEX) ointment 1 Each  1 Each Topical PRN    metoprolol succinate (TOPROL-XL) XL tablet 25 mg  25 mg Oral DAILY    polyethylene glycol (MIRALAX) packet 17 g  17 g Oral DAILY PRN    guaiFENesin (ROBITUSSIN) 100 mg/5 mL oral liquid 100 mg  100 mg Oral Q4H PRN    hydrALAZINE (APRESOLINE) 20 mg/mL injection 20 mg  20 mg IntraVENous Q4H PRN    atorvastatin (LIPITOR) tablet 80 mg  80 mg Oral QHS    lisinopril (PRINIVIL, ZESTRIL) tablet 40 mg  40 mg Oral DAILY    sodium chloride (NS) flush 5-40 mL  5-40 mL IntraVENous PRN    ondansetron (ZOFRAN) injection 4 mg  4 mg IntraVENous Q4H PRN    aspirin chewable tablet 81 mg  81 mg Oral DAILY    enoxaparin (LOVENOX) injection 40 mg  40 mg SubCUTAneous Q24H    dextrose 40% (GLUTOSE) oral gel 1 Tube  15 g Oral PRN    glucagon (GLUCAGEN) injection 1 mg  1 mg IntraMUSCular PRN    dextrose (D50W) injection syrg 12.5-25 g  25-50 mL IntraVENous PRN       Other Studies:    Mri Brain Wo Cont    Result Date: 12/12/2019  MRI BRAIN WITHOUT CONTRAST 12/12/2019 HISTORY: 49-year-old with hypertension and acute neurological deficit on waking. TECHNIQUE: Sagittal and axial T1-weighted, axial T2-weighted, axial and coronal FLAIR, axial T2-weighted gradient-echo, axial diffusion weighted images with ADC maps of the brain. COMPARISON: Head CT December 12, 2019 FINDINGS: On the diffusion weighted sequence there is a focus of restricted diffusion in the deep left periventricular white matter and corpus callosum posteriorly with associated hyperintense FLAIR signal. There is a focus first of restricted diffusion in the left cerebellar hemisphere posteriorly. There is variable restricted diffusion within the right paramedian yariel. There is variable hyperintense FLAIR signal within the yariel that may be associated with older ischemic events. Old infarcts are present in the thalami, left basal ganglia and left corona radiata. There is no acute intracranial hemorrhage. Specifically there is no thalamic hemorrhage. On the T2-weighted and FLAIR sequences, there are white matter hyperintensities compatible with chronic small vessel ischemic disease. A subcutaneous nodule in the left frontoparietal scalp is likely a sebaceous cyst. There is no hydrocephalus, intra-axial mass or extra-axial hematoma. IMPRESSION: 1. Acute to early subacute infarcts in the left cerebellar hemisphere, in the periventricular deep left frontoparietal white matter and likely additional areas of restricted diffusion in the right paramedian yariel. 2. Old lacunar infarcts in the corpus striatum and periventricular white matter as well as within the left martinez radiata. Cta Code Neuro Head And Neck W Cont    Result Date: 12/12/2019  History: Code stroke. FINDINGS: CT angiography was performed of the neck and head with contrast and three-dimensional CT angiography reconstruction and reformat was performed. NASCET criteria as needed. CT dose reduction was achieved through use of a standardized protocol tailored for this examination and automatic exposure control for dose modulation. Mild atherosclerosis at the carotid bulbs bilaterally. There is stenosis at the distal intracranial segment of the right vertebral artery. The basilar artery is patent. The anterior cerebral arteries are patent bilaterally. There is multifocal stenosis in the P2 segment of the left posterior cerebral artery. The dural venous sinuses are patent. IMPRESSION: Stenosis at the distal segment of the right vertebral artery and multifocal stenosis in the P2 segment of the left posterior cerebral artery. Ct Code Neuro Head Wo Contrast    Result Date: 12/12/2019  CT head without contrast History: code s. Hypertension, left-sided facial droop and leg weakness Technique: 5mm axial images were obtained from the skull base to the vertex without intravenous contrast.  Radiation dose reduction techniques were used for this study:  Our CT scanners use one or all of the following: Automated exposure control, adjustment of the mA and/or kVp according to patient's size, iterative reconstruction. Comparison: None Findings: The ventricles and sulci are normal in size and configuration. There are no extra-axial fluid collections.  There is no evidence to suggest an acute major territorial infarct. Patchy areas of decreased attenuation are present within the supratentorial white matter most compatible with mild chronic small vessel ischemic changes. Remote lacunar infarctions are present within the left internal capsule and left parietal lobe subcortical white matter. Age indeterminate infarctions are present within the left corona radiata/caudate body. There is a small remote right thalamic lacunar infarction. There is no convincing evidence of acute intraparenchymal hemorrhage or mass effect. Curvilinear region of increased density in the region of the left thalamus is felt to represent partial volume averaging from calcified choroid plexus rather than hemorrhage. The bony calvarium is intact. The visualized mastoid air cells and paranasal sinuses are well pneumatized and aerated. Partially calcified left scalp mass near the vertex of the most compatible with a sebaceous cyst.     Impression: 1. Age indeterminate infarctions within the left corona radiata/caudate. 2. Mild chronic small vessel ischemic changes and areas of remote infarction as described. 3. Probable partial volume averaging the region of the left thalamus rather than intraparenchymal hemorrhage. Results discussed with Dr. Holly Boss at the time of dictation at 9:20 AM on 12/12/2019. Duplex Lower Ext Venous Bilat    Result Date: 12/13/2019  Bilateral lower extremity venous ultrasound INDICATION:  Pain and swelling, Doppler ultrasound of both lower extremities was performed. FINDINGS:  There is normal flow in the greater saphenous, common femoral, superficial femoral, and popliteal veins. Normal compression and augmentation is demonstrated. The proximal calf veins are also patent.      IMPRESSION: No evidence of deep venous thrombosis in either lower extremity          Assessment and Plan:     Hospital Problems as of 3/27/2020 Date Reviewed: 3/3/2017          Codes Class Noted - Resolved POA    Hypomagnesemia ICD-10-CM: I13.71  ICD-9-CM: 275.2  3/7/2020 - Present Unknown        PFO (patent foramen ovale) ICD-10-CM: Q21.1  ICD-9-CM: 745.5  12/17/2019 - Present Yes        Arrhythmia ICD-10-CM: I49.9  ICD-9-CM: 427.9  12/15/2019 - Present Yes        Hyperlipemia ICD-10-CM: E78.5  ICD-9-CM: 272.4  12/15/2019 - Present Yes        * (Principal) Acute embolic stroke [facility]) [id number]: I63.9  ICD-9-CM: 434.11  12/12/2019 - Present Yes        Hypertension (Chronic) ICD-10-CM: I10  ICD-9-CM: 401.9  Unknown - Present Yes        Type 2 diabetes mellitus (HCC) (Chronic) ICD-10-CM: E11.9  ICD-9-CM: 250.00  Unknown - Present Yes              Plan:  Acute Embolic CVA  - MRI: acute to early subacute infarcts in multiple territories. - +PFO on TTE  - ASA and statin  - c/w PT    Hypertension: c/w meds    T2DM: ISS    Hypomagnesemia: replete and monitor     Diet:  DIET NUTRITIONAL SUPPLEMENTS  DIET DIABETIC CONSISTENT CARB  DVT PPx: lovenox  Code: Full Code    DISPO: medicaid pending    Medical Decision Making:    Labs/Imaging reviewed. Additional information obtained from nursing staff. Patient is moderate risk due to medical condition and comorbidities. Plan discussed with nursing staff. Plan discussed with patient/family. All questions/concerns were addressed. Pt/family agrees with the plan.          Signed By: Alejandra Doss MD     March 27, 2020

## 2020-03-27 NOTE — PROGRESS NOTES
Attempted to see pt for OT treatment this AM. Pt sleeping and fatigued and requested to participate later today. Will attempt to see at later time as schedule permits.     Thank you,    Mary Fairchild, OTR/L

## 2020-03-27 NOTE — PROGRESS NOTES
Problem: Mobility Impaired (Adult and Pediatric)  Goal: *Acute Goals and Plan of Care  Description  GOALS UPDATED ON RE-ASSESSMENT 3/20/2020 (advancements to goals in bold):  1. Patient will perform bed mobility with supervision and 0 verbal cues within 7 treatment days. 2. Patient will perform transfer bed to chair with SUPERVISION within 7 treatment days. 3. Patient will demonstrate fair+ dynamic balance throughout stance phase on L LE within 7 treatment days. 4. Patient will require STAND BY ASSIST throughout swing phase on (to advance) L LE within 7 treatment days. 5. Patient demonstrate 3+/5 strength in L LE hip flexion, hip abduction, and hip adduction within 7 treatment days. 6. Patient will ambulate 200ft+ with STAND BY ASSIST and least retrictive assistive device within 7 treatment days. 7. Patient will perform wheelchair mobility x75ft with MODIFIED INDEPENDENCE within 7 treatment days. PHYSICAL THERAPY: Daily Note and PM 3/27/2020  INPATIENT: PT Visit Days : 5  Payor: MEDICAID PENDING / Plan: Polly Joseph PENDING / Product Type: Medicaid /       NAME/AGE/GENDER: Jonathon Moss is a 53 y.o. male   PRIMARY DIAGNOSIS: Acute ischemic stroke ([address] [I63.9]  Cerebrovascular accident (CVA) due to embolism ([address] [Z87.2] Acute embolic stroke ([address] Acute embolic stroke ([address]       ICD-10: Treatment Diagnosis:   · Difficulty in walking, Not elsewhere classified (R26.2)  · Hemiplegia and hemiparesis following cerebral infarction affecting left non-dominant side (Z26.403)   Precaution/Allergies:  Patient has no known allergies. ASSESSMENT:     Mr. Anoop Myers is a 53year old admitted R MCA CVA. Patient was supine upon contact and agreeable to PT. Patient performs supine to sit with supervision and transfers to standing with CGA-SBA. Patient then ambulates 200' with luke walker and focus on positioning of L foot during strike and stance phase on L.  Patient with increased external rotation during left foot strike and stance phase. Patient sits to rest then ambulates an additional 100'. Wheelchair in tow for safety. Patient returns to his room where he performs SPT to EOB with SBA and sit to supine with supervision. Overall good progress towards physical therapy goals. Goals listed above are still appropriate. Will continue efforts as patient is still below functional baseline. This section established at most recent assessment   PROBLEM LIST (Impairments causing functional limitations):  1. Decreased Strength  2. Decreased ADL/Functional Activities  3. Decreased Transfer Abilities  4. Decreased Ambulation Ability/Technique  5. Decreased Balance  6. Increased Pain  7. Decreased Activity Tolerance  8. Increased Fatigue  9. Decreased Flexibility/Joint Mobility   INTERVENTIONS PLANNED: (Benefits and precautions of physical therapy have been discussed with the patient.)  1. Balance Exercise  2. Bed Mobility  3. Family Education  4. Gait Training  5. Home Exercise Program (HEP)  6. Manual Therapy  7. Neuromuscular Re-education/Strengthening  8. Range of Motion (ROM)  9. Therapeutic Activites  10. Therapeutic Exercise/Strengthening  11. Transfer Training     TREATMENT PLAN: Frequency/Duration: 3 times a week for duration of hospital stay  Rehabilitation Potential For Stated Goals: Good     REHAB RECOMMENDATIONS (at time of discharge pending progress):    Placement: It is my opinion, based on this patient's performance to date, that Mr. Ana Olson may benefit from intensive therapy at an [address] [place] [address] after discharge due to a probable need for close medical supervision by a rehab physician, a probable need for multiple therapy disciplines and potential to make ongoing and sustainable functional improvement that is of practical value. .  Equipment:    TBD pending progress with therapy.              HISTORY:   History of Present Injury/Illness (Reason for Referral):  Per H&P: \"Pt is a 54 y/o smoker with DM, HTN, who presented to ER with L leg and arm weakness, L facial droop, dysarthria. First noted L leg weakness late night 12/11 when he woke up to go to the bathroom. He was normal when he went to bed around 1030. Woke up this morning and had persistent weakness L leg and also now noted in L arm. EMS called. Noted to have slurred speech and L facial droop as well. Code S called in ER around 9am.  MRI with acute infarcts in L cerebellar hemisphere, deep frontoparietal white matter, and R paramedian yariel. Also noted old lacunar infarcts. No large vessel occlusion on CTA, but some stenosis noted. No hx afib, TIA, CVA. No CP, palpitations, SOB. \"  Past Medical History/Comorbidities:   Mr. Jonathon Smith  has a past medical history of Acute ischemic stroke ([address] (12/12/2019), Acute pancreatitis (11/19/2014), Cerebrovascular accident (CVA) due to embolism ([address] (12/12/2019), Diabetes ([address] (2002), Diabetes ([address] Diabetes mellitus, and Hypertension. He also has no past medical history of Arthritis, Asthma, Autoimmune disease ([address] CAD (coronary artery disease), Cancer ([address] Chronic kidney disease, COPD, Dementia, Dementia ([address] Heart failure ([address] Ill-defined condition, Infectious disease, Liver disease, Other ill-defined conditions(799.89), Psychiatric disorder, PUD (peptic ulcer disease), Seizures ([address] or Sleep disorder. Mr. Jonathon Smith  has a past surgical history that includes hx hernia repair and hx orthopaedic. Social History/Living Environment:   Home Environment: Apartment  # Steps to Enter: 12  Rails to Enter: Yes  [employer] : Bilateral  One/Two Story Residence: One story  Living Alone: No  Support Systems: Spouse/Significant Other/Partner  Patient Expects to be Discharged to[de-identified] Unknown  Current DME Used/Available at Home: None  Tub or Shower Type: Tub/Shower combination  Prior Level of Function/Work/Activity:  Independent, lives with wife in 2nd story 1 level apartment. No recent falls. Number of Personal Factors/Comorbidities that affect the Plan of Care: 1-2: MODERATE COMPLEXITY   EXAMINATION:   Most Recent Physical Functioning:   Gross Assessment:                  Posture:     Balance:  Sitting: Intact  Standing: Impaired  Standing - Static: Good  Standing - Dynamic : Fair Bed Mobility:  Supine to Sit: Supervision  Sit to Supine: Supervision  Wheelchair Mobility:     Transfers:  Sit to Stand: Contact guard assistance  Stand to Sit: Contact guard assistance  Gait:     Base of Support: Center of gravity altered;Narrowed  Speed/Clotilde: Slow  Step Length: Right shortened;Left shortened  Gait Abnormalities: Hemiplegic  Distance (ft): (200' then 100')  Assistive Device: Walker luke  Ambulation - Level of Assistance: Contact guard assistance  Interventions: Safety awareness training; Tactile cues; Verbal cues      Body Structures Involved:  1. Nerves  2. Voice/Speech  3. Bones  4. Joints  5. Muscles Body Functions Affected:  1. Mental  2. Sensory/Pain  3. Neuromusculoskeletal  4. Movement Related Activities and Participation Affected:  1. General Tasks and Demands  2. Mobility  3. Self Care  4. Interpersonal Interactions and Relationships   Number of elements that affect the Plan of Care: 4+: HIGH COMPLEXITY   CLINICAL PRESENTATION:   Presentation: Evolving clinical presentation with changing clinical characteristics: MODERATE COMPLEXITY   CLINICAL DECISION MAKIN [facility] Mobility Inpatient Short Form  How much difficulty does the patient currently have. .. Unable A Lot A Little None   1. Turning over in bed (including adjusting bedclothes, sheets and blankets)? [] 1   [] 2   [] 3   [x] 4   2. Sitting down on and standing up from a chair with arms ( e.g., wheelchair, bedside commode, etc.)   [] 1   [] 2   [x] 3   [] 4   3. Moving from lying on back to sitting on the side of the bed?    [] 1   [] 2   [x] 3   [] 4   How much help from another person does the patient currently need. .. Total A Lot A Little None   4. Moving to and from a bed to a chair (including a wheelchair)? [] 1   [] 2   [x] 3   [] 4   5. Need to walk in hospital room? [] 1   [] 2   [x] 3   [] 4   6. Climbing 3-5 steps with a railing? [] 1   [x] 2   [] 3   [] 4   © 2007, Trustees of [address] [place] [address] [address] [id number], under license to [employer]. All rights reserved      Score:  Initial: 13 Most Recent: 18 (Date:3/9/2020)    Interpretation of Tool:  Represents activities that are increasingly more difficult (i.e. Bed mobility, Transfers, Gait). Medical Necessity:     · Patient is expected to demonstrate progress in   · strength, range of motion, balance, coordination, and functional technique  ·  to   · increase independence with all mobility. · .  Reason for Services/Other Comments:  · Patient continues to require skilled intervention due to   · medical complications and mobility deficits which impact his level of function, safety, and independence as indicated above. · .   Use of outcome tool(s) and clinical judgement create a POC that gives a: Questionable prediction of patient's progress: MODERATE COMPLEXITY        TREATMENT:      Pre-treatment Symptoms/Complaints:  none  Pain: Initial: 0/10 Post Session:  0/10     Therapeutic Activity: (    25 Minutes): Therapeutic activities including bed mobility training, transfer training, ambulation on level ground, instruction in sequencing with luke walker, scootiing, SPT training, and patient education  to improve mobility, strength and balance. Required moderate Safety awareness training; Tactile cues; Verbal cues to promote static and dynamic balance in standing and promote coordination of bilateral, lower extremity(s).      Braces/Orthotics/Lines/Etc:   · Sling to LUE  Treatment/Session Assessment:    · Response to Treatment:  See above  · Interdisciplinary Collaboration:   o Physical Therapy Assistant  o Registered Nurse  · After treatment position/precautions:   o Supine in bed  o Bed alarm/tab alert on  o Bed/Chair-wheels locked  o Bed in low position  o Call light within reach  o RN notified   · Compliance with Program/Exercises: Compliant all of the time  · Recommendations/Intent for next treatment session: \"Next visit will focus on advancements to more challenging activities and reduction in assistance provided\".     Total Treatment Duration:  PT Patient Time In/Time Out  Time In: 1252  Time Out: Benny Mg PTA

## 2020-03-27 NOTE — PROGRESS NOTES
Problem: Self Care Deficits Care Plan (Adult)  Goal: *Acute Goals and Plan of Care (Insert Text)  Description    Goals per Re-evaluation on 3/18/2020:   1. Patient will demonstrate appropriate safety awareness and protection of L UE during bed mobility and functional transfers with minimal cues. (Progressing, still needs cues, 3/18/2020)  2. Patient will complete total body bathing and dressing with Min A and adaptive equipment as needed. (Progressing, UB bathing Min A, UB dressing at [address] [place] [address] , LB dressing at [facility], 3/18/2020)  3. Patient will complete weightbearing into the L UE with ADL tasks with minimal assistance to improve ability to use as a functional assist during ADL tasks. (Progressing, 3/18/2020)4. Patient will demonstrate L UE SROM HEP within 7 days. (Progressing, 3/18/2020)  5. Pt will complete bed mobility with Mod I and minimal verbal cueing (Progressing, Supervision, 3/25/2020). 6. Pt will complete dynamic sitting balance for ADLs at mod I with good balance (Progressing, 3/18/2020)  7. Pt will complete functional transfers with Supervision with equipment as needed. (Progressing, SBA 3/18/2020)  8. Pt will complete grooming tasks in standing at sink level x5 mins with good balance and equipment as needed (Progressing, CGA with fair balance 3/25/2020)  9. Pt will complete grooming standing at sink level with SBA and use of adaptive equipment as needed. 10. Pt will don/doff UE sling with SBA and use of minimal verbal and visual cueing for correct application (Progressing, Min A 3/25/2020). Goals per Re-evaluation on 3/27/2020:   1. Patient will demonstrate appropriate safety awareness and protection of L UE during bed mobility and functional transfers with no verbal cues. 2. Patient will complete lower body bathing and dressing with Min A and adaptive equipment as needed. 3. Patient will complete upper body bathing and dressing with SBA and adaptive equipment as needed.   4. Patient will complete weightbearing into the L UE with ADL tasks with minimal assistance to improve ability to use as a functional assist during ADL tasks. 5. Patient will demonstrate L UE SROM HEP within 7 days. 6. Pt will complete bed mobility with Mod I and no verbal cueing. 7. Pt will complete functional transfers with Supervision with equipment as needed. 8. Pt will don/doff UE sling with Mod I and no verbal cueing. 9. Pt will complete toileting with SBA for toilet transfer and gown management. Outcome: Progressing Towards Goal     OCCUPATIONAL THERAPY: Daily Note, Re-evaluation and PM    3/27/2020  INPATIENT: OT Visit Days: 1  Payor: MEDICAID PENDING / Plan: [employer] PENDING / Product Type: Medicaid /      NAME/AGE/GENDER: Sandra Allen is a 53 y.o. male   PRIMARY DIAGNOSIS:  Acute ischemic stroke ([facility] [I63.9]  Cerebrovascular accident (CVA) due to embolism ([facility] [T94.2] Acute embolic stroke ([facility] Acute embolic stroke ([facility]       ICD-10: Treatment Diagnosis:    · Generalized Muscle Weakness (M62.81)  · Other lack of cordination (R27.8)  · Hemiplegia and hemiparesis following cerebral infarction affecting   · left non-dominant side (I69.354)  · Abnormal posture (R29.3)   Precautions/Allergies:    NO PULLING ON LUE   LUE in sling with shoulder joint approximated and supported, needs taping   Patient has no known allergies. ASSESSMENT:     Mr. Latrell Collins presents to the hospital with L sided weakness and acute ischemic CVA. MRI revealed acute to subacute L cerebellar and R paramedian yariel infarcts. 3/27/2020: Pt seen today for re-evaluation as he as reached his 7th visit of acute care stay. Pt supine in bed upon arrival. Pt with flat affect but agreeable to participate with therapy. Reports no pain prior to or following functional mobility, but still requires cueing to guard L UE. Pt completes bed mobility with Supervision. Good static seated and dynamic balance.  Pt demonstrates learned knowledge of one handed dressing technique, but still requires Min A to don with assist needed for placement of L UE through armhole. Pt requires Min A and increased time to don UE sling. Great improvement in ability to don since acute care admission. Pt requires CGA with use of luke walker for functional transfers from sit to stand and to walk from bed to commode. Pt completes transfer onto commode with CGA and use of R hand rail. Pt requires SBA for wiping and Mod A to don diaper brief. Pt able to assist with pulling diaper brief above knee with CGA from therapy for increased stability. Pt requires CGA with use of luke walker to walk from commode to sink. Initially pt requires CGA to wash hands but demonstrates ability to perform with SBA due to improved static standing balance. CGA with use of luke walker from sink to recliner chair with pt still dragging left foot when walking. Pt requests diet Pepsi and requires Min A to open soda. Pt left seated upright in recliner chair with all needs met and within reach. RN notified. Pt still functioning below baseline for ADLs and functional mobility and would benefit from continuing to be seen by occupational therapy during acute care stay. Will continue POC. This section established at most recent assessment   PROBLEM LIST (Impairments causing functional limitations):  1. Decreased Strength  2. Decreased ADL/Functional Activities  3. Decreased Transfer Abilities  4. Decreased Ambulation Ability/Technique  5. Decreased Balance  6. Decreased Activity Tolerance  7. Increased Fatigue  8. Decreased Flexibility/Joint Mobility  9. Decreased Knowledge of Precautions  10. Decreased [place] with Home Exercise Program   INTERVENTIONS PLANNED: (Benefits and precautions of occupational therapy have been discussed with the patient.)  1. Activities of daily living training  2. Adaptive equipment training  3. Balance training  4. Clothing management  5. Cognitive training  6.  Donning&doffing training  7. Keanu tech training  8. Neuromuscular re-eduation  9. Therapeutic activity  10. Therapeutic exercise     TREATMENT PLAN: Frequency/Duration: Follow patient 3 times per week to address above goals. Rehabilitation Potential For Stated Goals: Excellent     REHAB RECOMMENDATIONS (at time of discharge pending progress):    Placement: It is my opinion, based on this patient's performance to date, that Mr. Antony Murray may benefit from intensive therapy at an [address] [place] [address] after discharge due to potential to make ongoing and sustainable functional improvement that is of practical value. Becca Hind Pt functioning far below independent baseline, demonstrating good improvement and participation. Pt would likely benefit greatly and increase independence from inpatient rehab stay. Equipment:    TBD               OCCUPATIONAL PROFILE AND HISTORY:   History of Present Injury/Illness (Reason for Referral):  See H&P  Past Medical History/Comorbidities:   Mr. Antony Murray  has a past medical history of Acute ischemic stroke ([address] (12/12/2019), Acute pancreatitis (11/19/2014), Cerebrovascular accident (CVA) due to embolism ([address] (12/12/2019), Diabetes ([address] (2002), Diabetes ([address] Diabetes mellitus, and Hypertension. He also has no past medical history of Arthritis, Asthma, Autoimmune disease ([address] CAD (coronary artery disease), Cancer ([address] Chronic kidney disease, COPD, Dementia, Dementia ([address] Heart failure ([address] Ill-defined condition, Infectious disease, Liver disease, Other ill-defined conditions(799.89), Psychiatric disorder, PUD (peptic ulcer disease), Seizures ([address] or Sleep disorder. Mr. Antony Murray  has a past surgical history that includes hx hernia repair and hx orthopaedic.   Social History/Living Environment:   Home Environment: Apartment  # Steps to Enter: 12  Rails to Enter: Yes  [employer] : Bilateral  One/Two Story Residence: One story  Living Alone: No  Support Systems: Spouse/Significant Other/Partner  Patient Expects to be Discharged to[de-identified] Unknown  Current DME Used/Available at Home: None  Tub or Shower Type: Tub/Shower combination  Prior Level of Function/Work/Activity:  Pt lives at home with his wife. Pt is typically independent with ADL/functional mobility. Pt does not drive. Pt was working part-time at [employer]. Personal Factors:          Age:  53 y.o. Past/Current Experience:  CVA with flaccid L side        Other factors that influence how disability is experienced by the patient:  multiple co-morbidities    Number of Personal Factors/Comorbidities that affect the Plan of Care: Extensive review of physical, cognitive, and psychosocial performance (3+):  HIGH COMPLEXITY   ASSESSMENT OF OCCUPATIONAL PERFORMANCE[de-identified]   Activities of Daily Living:   Basic ADLs (From Assessment) Complex ADLs (From Assessment)   Feeding: Setup  Oral Facial Hygiene/Grooming: Minimum assistance  Bathing: Moderate assistance  Upper Body Dressing: Minimum assistance  Lower Body Dressing: Maximum assistance  Toileting: Moderate assistance Instrumental ADL  Meal Preparation: Total assistance  Homemaking:  Total assistance  Medication Management: Total assistance  Financial Management: Total assistance   Grooming/Bathing/Dressing Activities of Daily Living   Grooming  Grooming Assistance: Contact guard assistance  Position Performed: Standing  Washing Hands: Contact guard assistance;Stand-by assistance     Upper Body Bathing  Bathing Assistance: Min A   Position Performed: Seated in chair  Feeding  Feeding Assistance: Minimum assistance(Container Managment)     Toileting  Toileting Assistance: Contact guard assistance(For transfer/donning brief)  Bladder Hygiene: Stand-by assistance  Clothing Management: Minimum assistance   Upper Body Dressing Assistance  Dressing Assistance: Minimum assistance Functional Transfers  Bathroom Mobility: Contact guard assistance  Toilet Transfer : Contact guard assistance   Lower Body Dressing Assistance  Socks: Total assistance (dependent) Bed/Mat Mobility  Supine to Sit: Supervision  Sit to Supine: Supervision  Sit to Stand: Contact guard assistance  Stand to Sit: Contact guard assistance     Most Recent Physical Functioning:   Gross Assessment:                  Posture:     Balance:  Sitting: Intact  Sitting - Static: Good (unsupported)  Sitting - Dynamic: Good (unsupported)  Standing: Impaired  Standing - Static: Good  Standing - Dynamic : Fair Bed Mobility:  Supine to Sit: Supervision  Sit to Supine: Supervision  Wheelchair Mobility:     Transfers:  Sit to Stand: Contact guard assistance  Stand to Sit: Contact guard assistance            Patient Vitals for the past 6 hrs:   BP BP Patient Position SpO2 Pulse   20 1200 129/83 At rest 97 % 71       Mental Status  Neurologic State: Alert  Orientation Level: Oriented X4  Cognition: Follows commands  Perception: Cues to attend to left side of body, Cues to maintain midline in sitting, Cues to maintain midline in standing, Tactile, Verbal, Visual  Perseveration: No perseveration noted  Safety/Judgement: Fall prevention                          Physical Skills Involved:  1. Range of Motion  2. Balance  3. Strength  4. Activity Tolerance  5. Fine Motor Control  6. Gross Motor Control Cognitive Skills Affected (resulting in the inability to perform in a timely and safe manner):  1. Perception  2. Expression Psychosocial Skills Affected:  1. Habits/Routines  2. Environmental Adaptation  3. Social Interaction  4. Emotional Regulation  5. Self-Awareness  6. Awareness of Others  7. Social Roles   Number of elements that affect the Plan of Care: 5+:  HIGH COMPLEXITY   CLINICAL DECISION MAKIN [facility] [address] [id number] AM-PAC 6 Clicks   Daily Activity Inpatient Short Form  How much help from another person does the patient currently need. .. Total A Lot A Little None   1. Putting on and taking off regular lower body clothing?    [] 1   [x] 2   [] 3   [] 4   2.  Bathing (including washing, rinsing, drying)? [] 1   [] 2   [x] 3   [] 4   3. Toileting, which includes using toilet, bedpan or urinal?   [] 1   [] 2   [x] 3   [] 4   4. Putting on and taking off regular upper body clothing? [] 1   [] 2   [x] 3   [] 4   5. Taking care of personal grooming such as brushing teeth? [] 1   [] 2   [x] 3   [] 4   6. Eating meals? [] 1   [] 2   [x] 3   [] 4   © 2007, Trustees of [address] [place] [address] [address] [id number], under license to [employer]. All rights reserved      Score:  Initial: 11 Most Recent: 15 (Date: 3/18/2020 ); 17 (3/27/2020)    Interpretation of Tool:  Represents activities that are increasingly more difficult (i.e. Bed mobility, Transfers, Gait). Medical Necessity:     · Patient demonstrates   · good and excellent  ·  rehab potential due to higher previous functional level. Reason for Services/Other Comments:  · Patient continues to require skilled intervention due to   · Decreased independence with ADL/functional transfers that impacts overall quality of life. · .   Use of outcome tool(s) and clinical judgement create a POC that gives a: MODERATE COMPLEXITY         TREATMENT:   (In addition to Assessment/Re-Assessment sessions the following treatments were rendered)     Pre-treatment Symptoms/Complaints:  \"I think I need to go to the bathroom. \"  Pain: Initial:   Pain Intensity 1: 0  Post Session: Unchanged       Self Care: (25 minutes): Procedure(s) utilized to improve and/or restore self-care/home management as related to dressing, toileting, grooming and self feeding. Required minimal visual, verbal and manual cueing to facilitate activities of daily living skills and compensatory activities. Pt demonstrates learned knowledge of one handed dressing technique, but still requires Min A to don with assist needed for placement of L UE through armhole. Pt requires Min A and increased time to don UE sling.  Pt requires CGA with use of luke walker for functional transfers from sit to stand and to walk from bed to commode. Pt completes transfer onto commode with CGA and use of R hand rail. Pt requires SBA for wiping and Mod A to don diaper brief. Pt able to assist with pulling diaper brief above knee with CGA from therapy for increased stability. Pt requires CGA with use of luke walker to walk from commode to sink. Initially pt requires CGA to wash hands but demonstrated ability to perform with SBA due to good static standing balance. Pt requests diet Pepsi and requires Min A to open soda. Braces/Orthotics/Lines/Etc:   · O2 device: Room air  · LUE sling  · Treatment/Session Assessment:    · Response to Treatment:  Pt making good progress towards goals and is a great participant. · Interdisciplinary Collaboration:   o Occupational Therapist  o Registered Nurse  · After treatment position/precautions:   o Up in chair  o Bed/Chair-wheels locked  o Bed in low position  o Call light within reach  o RN notified  o [address] [place] [address] [place] in [address]   · Compliance with Program/Exercises: Compliant all of the time, Will assess as treatment progresses. · Recommendations/Intent for next treatment session: \"Next visit will focus on advancements to more challenging activities and reduction in assistance provided\".   Total Treatment Duration:   OT Patient Time In/Time Out  Time In: 1520  Time Out: 1555     Mary Fairchild

## 2020-03-28 PROCEDURE — 74011250636 HC RX REV CODE- 250/636: Performed by: INTERNAL MEDICINE

## 2020-03-28 PROCEDURE — 74011250637 HC RX REV CODE- 250/637: Performed by: INTERNAL MEDICINE

## 2020-03-28 PROCEDURE — 65270000029 HC RM PRIVATE

## 2020-03-28 PROCEDURE — 74011250637 HC RX REV CODE- 250/637: Performed by: HOSPITALIST

## 2020-03-28 RX ADMIN — ACETAMINOPHEN 650 MG: 325 TABLET, FILM COATED ORAL at 05:40

## 2020-03-28 RX ADMIN — LISINOPRIL 40 MG: 20 TABLET ORAL at 09:13

## 2020-03-28 RX ADMIN — ENOXAPARIN SODIUM 40 MG: 40 INJECTION SUBCUTANEOUS at 15:09

## 2020-03-28 RX ADMIN — DIPHENHYDRAMINE HYDROCHLORIDE 25 MG: 25 CAPSULE ORAL at 18:00

## 2020-03-28 RX ADMIN — ACETAMINOPHEN 650 MG: 325 TABLET, FILM COATED ORAL at 21:18

## 2020-03-28 RX ADMIN — ATORVASTATIN CALCIUM 80 MG: 80 TABLET, FILM COATED ORAL at 21:18

## 2020-03-28 RX ADMIN — ASPIRIN 81 MG 81 MG: 81 TABLET ORAL at 09:13

## 2020-03-28 RX ADMIN — Medication 400 MG: at 09:13

## 2020-03-28 RX ADMIN — METOPROLOL SUCCINATE 25 MG: 25 TABLET, FILM COATED, EXTENDED RELEASE ORAL at 09:13

## 2020-03-28 RX ADMIN — ACETAMINOPHEN 650 MG: 325 TABLET, FILM COATED ORAL at 15:09

## 2020-03-28 RX ADMIN — GUAIFENESIN 100 MG: 100 SOLUTION ORAL at 18:00

## 2020-03-28 NOTE — PROGRESS NOTES
Progress Note    Patient: Scarlet Tellez MRN: [id number]  SSN: [id number]    YOB: 1975  Age: 53 y.o. Sex: male      Admit Date: 12/12/2019    LOS: 107 days     Subjective:     Seen and examined, no acute complains     Objective:     Vitals:    03/27/20 2000 03/28/20 0000 03/28/20 0400 03/28/20 0800   BP: 137/85 126/83 124/84 136/84   Pulse: 64 61 76 69   Resp: 18 14 16 17   Temp: 98.4 °F (36.9 °C) 98.2 °F (36.8 °C) 98.2 °F (36.8 °C) 97.7 °F (36.5 °C)   SpO2: 97% 96% 99% 97%   Weight:       Height:            Intake and Output:  Current Shift: No intake/output data recorded. Last three shifts: No intake/output data recorded. Physical Exam:   No acute distress  Eyes PERRLA EOMI  Lungs: Clear to auscultation bilaterally  Cardiac regular hemodynamically murmurs  Abdomen, soft nontender nondistended  Extremities no calf tenderness no edema  Neurology: left hemiplegia    Lab/Data Review: All lab results for the last 24 hours reviewed. Assessment:     Principal Problem:    Acute embolic stroke ([facility] (96/40/6828)    Active Problems:    Hypertension ()      Type 2 diabetes mellitus ([facility] ()      Arrhythmia (12/15/2019)      Hyperlipemia (12/15/2019)      PFO (patent foramen ovale) (12/17/2019)      Hypomagnesemia (3/7/2020)        Plan:     S/p Acute Embolic CVA  - MRI: acute to early subacute infarcts in multiple territories.   - +PFO on TTE  - ASA and statin  - continue  PT  - neurology following, case discussed , appreciate input       Hypertension:   Continue with home meds  Lisinopril 40 mg PO daily  Hydralazine PRN  Continue metoprolol     T2DM:   Continue insulin sliding scale     Hypomagnesemia:   Replaced, and monitor     HLD  Continue atorvastatin     placement: medicaid pending     Medical Decision Making:    Signed By: Matilde Ayala MD     March 28, 2020

## 2020-03-28 NOTE — PROGRESS NOTES
Problem: Pressure Injury - Risk of  Goal: *Prevention of pressure injury  Description: Document Dewey Scale and appropriate interventions in the flowsheet. Outcome: Progressing Towards Goal  Note: Pressure Injury Interventions:  Sensory Interventions: Assess changes in LOC    Moisture Interventions: Absorbent underpads, Check for incontinence Q2 hours and as needed, Limit adult briefs    Activity Interventions: Increase time out of bed, Pressure redistribution bed/mattress(bed type), PT/OT evaluation    Mobility Interventions: HOB 30 degrees or less, Pressure redistribution bed/mattress (bed type), PT/OT evaluation    Nutrition Interventions: Document food/fluid/supplement intake    Friction and Shear Interventions: HOB 30 degrees or less, Minimize layers                Problem: Patient Education: Go to Patient Education Activity  Goal: Patient/Family Education  Outcome: Progressing Towards Goal     Problem: Falls - Risk of  Goal: *Absence of Falls  Description: Document Jeanne Fall Risk and appropriate interventions in the flowsheet.   Outcome: Progressing Towards Goal  Note: Fall Risk Interventions:  Mobility Interventions: Bed/chair exit alarm, Mechanical lift, OT consult for ADLs, Patient to call before getting OOB    Mentation Interventions: Bed/chair exit alarm, Adequate sleep, hydration, pain control    Medication Interventions: Bed/chair exit alarm, Evaluate medications/consider consulting pharmacy, Patient to call before getting OOB, Teach patient to arise slowly    Elimination Interventions: Call light in reach, Bed/chair exit alarm, Patient to call for help with toileting needs, Stay With Me (per policy)    History of Falls Interventions: Bed/chair exit alarm, Door open when patient unattended, Investigate reason for fall         Problem: Patient Education: Go to Patient Education Activity  Goal: Patient/Family Education  Outcome: Progressing Towards Goal     Problem: Diabetes Self-Management  Goal: *Disease process and treatment process  Description: Define diabetes and identify own type of diabetes; list 3 options for treating diabetes. Outcome: Progressing Towards Goal  Goal: *Incorporating nutritional management into lifestyle  Description: Describe effect of type, amount and timing of food on blood glucose; list 3 methods for planning meals. Outcome: Progressing Towards Goal  Goal: *Incorporating physical activity into lifestyle  Description: State effect of exercise on blood glucose levels. Outcome: Progressing Towards Goal  Goal: *Developing strategies to promote health/change behavior  Description: Define the ABC's of diabetes; identify appropriate screenings, schedule and personal plan for screenings. Outcome: Progressing Towards Goal  Goal: *Using medications safely  Description: State effect of diabetes medications on diabetes; name diabetes medication taking, action and side effects. Outcome: Progressing Towards Goal  Goal: *Monitoring blood glucose, interpreting and using results  Description: Identify recommended blood glucose targets  and personal targets. Outcome: Progressing Towards Goal  Goal: *Prevention, detection, treatment of acute complications  Description: List symptoms of hyper- and hypoglycemia; describe how to treat low blood sugar and actions for lowering  high blood glucose level. Outcome: Progressing Towards Goal  Goal: *Prevention, detection and treatment of chronic complications  Description: Define the natural course of diabetes and describe the relationship of blood glucose levels to long term complications of diabetes.   Outcome: Progressing Towards Goal  Goal: *Developing strategies to address psychosocial issues  Description: Describe feelings about living with diabetes; identify support needed and support network  Outcome: Progressing Towards Goal     Problem: Patient Education: Go to Patient Education Activity  Goal: Patient/Family Education  Outcome: Progressing Towards Goal     Problem: General Medical Care Plan  Goal: *Vital signs within specified parameters  Outcome: Progressing Towards Goal  Goal: *Labs within defined limits  Outcome: Progressing Towards Goal  Goal: *Absence of infection signs and symptoms  Description: Wash hand more often   Outcome: Progressing Towards Goal  Goal: *Optimal pain control at patient's stated goal  Outcome: Progressing Towards Goal  Goal: *Skin integrity maintained  Outcome: Progressing Towards Goal  Goal: *Fluid volume balance  Outcome: Progressing Towards Goal  Goal: *Optimize nutritional status  Outcome: Progressing Towards Goal  Goal: *Anxiety reduced or absent  Outcome: Progressing Towards Goal  Goal: *Progressive mobility and function (eg: ADL's)  Outcome: Progressing Towards Goal     Problem: Patient Education: Go to Patient Education Activity  Goal: Patient/Family Education  Outcome: Progressing Towards Goal     Problem: Ischemic Stroke: Discharge Outcomes  Goal: *Verbalizes anxiety and depression are reduced or absent  Outcome: Progressing Towards Goal  Goal: *Verbalize understanding of risk factor modification(Stroke Metric)  Outcome: Progressing Towards Goal  Goal: *Hemodynamically stable  Outcome: Progressing Towards Goal  Goal: *Absence of aspiration pneumonia  Outcome: Progressing Towards Goal  Goal: *Aware of needed dietary changes  Outcome: Progressing Towards Goal  Goal: *Verbalize understanding of prescribed medications including anti-coagulants, anti-lipid, and/or anti-platelets(Stroke Metric)  Outcome: Progressing Towards Goal  Goal: *Tolerating diet  Outcome: Progressing Towards Goal  Goal: *Aware of follow-up diagnostics related to anticoagulants  Outcome: Progressing Towards Goal  Goal: *Ability to perform ADLs and demonstrates progressive mobility and function  Outcome: Progressing Towards Goal  Goal: *Absence of DVT(Stroke Metric)  Outcome: Progressing Towards Goal  Goal: *Absence of aspiration  Outcome: Progressing Towards Goal  Goal: *Optimal pain control at patient's stated goal  Outcome: Progressing Towards Goal  Goal: *Home safety concerns addressed  Outcome: Progressing Towards Goal  Goal: *Describes available resources and support systems  Outcome: Progressing Towards Goal  Goal: *Verbalizes understanding of activation of EMS(911) for stroke symptoms(Stroke Metric)  Outcome: Progressing Towards Goal  Goal: *Understands and describes signs and symptoms to report to providers(Stroke Metric)  Outcome: Progressing Towards Goal  Goal: *Neurolgocially stable (absence of additional neurological deficits)  Outcome: Progressing Towards Goal  Goal: *Verbalizes importance of follow-up with primary care physician(Stroke Metric)  Outcome: Progressing Towards Goal  Goal: *Smoking cessation discussed,if applicable(Stroke Metric)  Outcome: Progressing Towards Goal  Goal: *Depression screening completed(Stroke Metric)  Outcome: Progressing Towards Goal     Problem: Pain  Goal: *Control of Pain  Outcome: Progressing Towards Goal     Problem: Patient Education: Go to Patient Education Activity  Goal: Patient/Family Education  Outcome: Progressing Towards Goal

## 2020-03-29 LAB
ANION GAP SERPL CALC-SCNC: 7 MMOL/L (ref 7–16)
BASOPHILS # BLD: 0.1 K/UL (ref 0–0.2)
BASOPHILS NFR BLD: 1 % (ref 0–2)
BUN SERPL-MCNC: 16 MG/DL (ref 6–23)
CALCIUM SERPL-MCNC: 9.5 MG/DL (ref 8.3–10.4)
CHLORIDE SERPL-SCNC: 109 MMOL/L (ref 98–107)
CO2 SERPL-SCNC: 25 MMOL/L (ref 21–32)
CREAT SERPL-MCNC: 1.13 MG/DL (ref 0.8–1.5)
DIFFERENTIAL METHOD BLD: ABNORMAL
EOSINOPHIL # BLD: 0.1 K/UL (ref 0–0.8)
EOSINOPHIL NFR BLD: 1 % (ref 0.5–7.8)
ERYTHROCYTE [DISTWIDTH] IN BLOOD BY AUTOMATED COUNT: 16.9 % (ref 11.9–14.6)
GLUCOSE BLD STRIP.AUTO-MCNC: 129 MG/DL (ref 65–100)
GLUCOSE SERPL-MCNC: 128 MG/DL (ref 65–100)
HCT VFR BLD AUTO: 36.2 % (ref 41.1–50.3)
HGB BLD-MCNC: 11.5 G/DL (ref 13.6–17.2)
IMM GRANULOCYTES # BLD AUTO: 0 K/UL (ref 0–0.5)
IMM GRANULOCYTES NFR BLD AUTO: 0 % (ref 0–5)
LYMPHOCYTES # BLD: 2.3 K/UL (ref 0.5–4.6)
LYMPHOCYTES NFR BLD: 35 % (ref 13–44)
MCH RBC QN AUTO: 26.4 PG (ref 26.1–32.9)
MCHC RBC AUTO-ENTMCNC: 31.8 G/DL (ref 31.4–35)
MCV RBC AUTO: 83 FL (ref 79.6–97.8)
MONOCYTES # BLD: 0.4 K/UL (ref 0.1–1.3)
MONOCYTES NFR BLD: 7 % (ref 4–12)
NEUTS SEG # BLD: 3.7 K/UL (ref 1.7–8.2)
NEUTS SEG NFR BLD: 56 % (ref 43–78)
NRBC # BLD: 0 K/UL (ref 0–0.2)
PLATELET # BLD AUTO: 176 K/UL (ref 150–450)
PMV BLD AUTO: 11.9 FL (ref 9.4–12.3)
POTASSIUM SERPL-SCNC: 4.2 MMOL/L (ref 3.5–5.1)
RBC # BLD AUTO: 4.36 M/UL (ref 4.23–5.6)
SODIUM SERPL-SCNC: 141 MMOL/L (ref 136–145)
WBC # BLD AUTO: 6.5 K/UL (ref 4.3–11.1)

## 2020-03-29 PROCEDURE — 82962 GLUCOSE BLOOD TEST: CPT

## 2020-03-29 PROCEDURE — 74011250637 HC RX REV CODE- 250/637: Performed by: HOSPITALIST

## 2020-03-29 PROCEDURE — 80048 BASIC METABOLIC PNL TOTAL CA: CPT

## 2020-03-29 PROCEDURE — 36415 COLL VENOUS BLD VENIPUNCTURE: CPT

## 2020-03-29 PROCEDURE — 74011250637 HC RX REV CODE- 250/637: Performed by: INTERNAL MEDICINE

## 2020-03-29 PROCEDURE — 85025 COMPLETE CBC W/AUTO DIFF WBC: CPT

## 2020-03-29 PROCEDURE — 74011250636 HC RX REV CODE- 250/636: Performed by: INTERNAL MEDICINE

## 2020-03-29 PROCEDURE — 65270000029 HC RM PRIVATE

## 2020-03-29 RX ADMIN — Medication 400 MG: at 09:00

## 2020-03-29 RX ADMIN — ASPIRIN 81 MG 81 MG: 81 TABLET ORAL at 09:00

## 2020-03-29 RX ADMIN — ACETAMINOPHEN 650 MG: 325 TABLET, FILM COATED ORAL at 16:02

## 2020-03-29 RX ADMIN — ENOXAPARIN SODIUM 40 MG: 40 INJECTION SUBCUTANEOUS at 16:02

## 2020-03-29 RX ADMIN — LISINOPRIL 40 MG: 20 TABLET ORAL at 09:00

## 2020-03-29 RX ADMIN — ATORVASTATIN CALCIUM 80 MG: 80 TABLET, FILM COATED ORAL at 21:52

## 2020-03-29 RX ADMIN — ACETAMINOPHEN 650 MG: 325 TABLET, FILM COATED ORAL at 05:02

## 2020-03-29 RX ADMIN — GUAIFENESIN 100 MG: 100 SOLUTION ORAL at 18:15

## 2020-03-29 RX ADMIN — METOPROLOL SUCCINATE 25 MG: 25 TABLET, FILM COATED, EXTENDED RELEASE ORAL at 09:00

## 2020-03-29 RX ADMIN — DIPHENHYDRAMINE HYDROCHLORIDE 25 MG: 25 CAPSULE ORAL at 18:15

## 2020-03-29 RX ADMIN — ACETAMINOPHEN 650 MG: 325 TABLET, FILM COATED ORAL at 21:51

## 2020-03-29 NOTE — PROGRESS NOTES
Progress Note    Patient: Edilberto Dos Santos MRN: [id number]  SSN: [id number]    YOB: 1975  Age: 53 y.o. Sex: male      Admit Date: 12/12/2019    LOS: 108 days     Subjective:     Patient seen and examined, no acute events since yesterday. Pending placement. Objective:     Vitals:    03/29/20 0000 03/29/20 0400 03/29/20 0800 03/29/20 1200   BP: 123/79 119/77 120/80 116/74   Pulse: 71 77 68 79   Resp: 17 17 18 17   Temp: 98.2 °F (36.8 °C) 98 °F (36.7 °C) 98.4 °F (36.9 °C) 98.6 °F (37 °C)   SpO2: 97% 97% 97% 98%   Weight:       Height:            Intake and Output:  Current Shift: No intake/output data recorded. Last three shifts: No intake/output data recorded. Physical Exam:   GENERAL: alert, cooperative, no distress, appears stated age  LUNG: clear to auscultation bilaterally  HEART: regular rate and rhythm, S1, S2 increased, soft systolic murmur  ABDOMEN: soft, non-tender. Bowel sounds normal. No masses,  no organomegaly  EXTREMITIES:  extremities normal, atraumatic, no cyanosis or edema    Lab/Data Review: All lab results for the last 24 hours reviewed. Assessment:     Principal Problem:    Acute embolic stroke ([facility] [address] (73/67/1658)    Active Problems:    Hypertension ()      Type 2 diabetes mellitus ([facility] [address] ()      Arrhythmia (12/15/2019)      Hyperlipemia (12/15/2019)      PFO (patent foramen ovale) (12/17/2019)      Hypomagnesemia (3/7/2020)        Plan:   S/p Acute Embolic CVA  Stable, with left hemiplegia, will require placement  - MRI showed acute to early subacute infarcts in multiple territories.   - PFO on TTE, with right to left shunt  - continue ASA and statin  - continue  PT  - requires placement         Hypertension:   Continue with home meds:   Lisinopril 40 mg PO daily  Hydralazine PRN  Continue metoprolol     T2DM:   Continue insulin sliding scale     Hypomagnesemia:   Replaced, and monitor      HLD  Continue atorvastatin     placement: medicaid pending     Medical Decision Making:      Signed By: Marston Hatchet, MD     March 29, 2020

## 2020-03-29 NOTE — PROGRESS NOTES
Problem: Pain  Goal: *Control of Pain  Outcome: Progressing Towards Goal     Problem: Pressure Injury - Risk of  Goal: *Prevention of pressure injury  Description: Document Dewey Scale and appropriate interventions in the flowsheet.   3/29/2020 0351 by Dee Cheema, RN  Note: Pressure Injury Interventions:  Sensory Interventions: Assess changes in LOC    Moisture Interventions: Absorbent underpads, Limit adult briefs, Minimize layers    Activity Interventions: Increase time out of bed, Pressure redistribution bed/mattress(bed type), PT/OT evaluation    Mobility Interventions: HOB 30 degrees or less, Pressure redistribution bed/mattress (bed type), PT/OT evaluation    Nutrition Interventions: Document food/fluid/supplement intake    Friction and Shear Interventions: HOB 30 degrees or less, Minimize layers             3/29/2020 0351 by Dee Cheema RN  Outcome: Progressing Towards Goal  Note: Pressure Injury Interventions:  Sensory Interventions: Assess changes in LOC    Moisture Interventions: Absorbent underpads, Limit adult briefs, Minimize layers    Activity Interventions: Increase time out of bed, Pressure redistribution bed/mattress(bed type), PT/OT evaluation    Mobility Interventions: HOB 30 degrees or less, Pressure redistribution bed/mattress (bed type), PT/OT evaluation    Nutrition Interventions: Document food/fluid/supplement intake    Friction and Shear Interventions: HOB 30 degrees or less, Minimize layers             3/29/2020 0350 by Dee Cheema, RN  Note: Pressure Injury Interventions:  Sensory Interventions: Assess changes in LOC    Moisture Interventions: Absorbent underpads, Limit adult briefs, Minimize layers    Activity Interventions: Increase time out of bed, Pressure redistribution bed/mattress(bed type), PT/OT evaluation    Mobility Interventions: HOB 30 degrees or less, Pressure redistribution bed/mattress (bed type), PT/OT evaluation    Nutrition Interventions: Document food/fluid/supplement intake    Friction and Shear Interventions: HOB 30 degrees or less, Minimize layers                Problem: Pain  Goal: *Control of Pain  Outcome: Progressing Towards Goal     Problem: Patient Education: Go to Patient Education Activity  Goal: Patient/Family Education  Outcome: Progressing Towards Goal

## 2020-03-30 LAB
ANION GAP SERPL CALC-SCNC: 6 MMOL/L (ref 7–16)
BASOPHILS # BLD: 0 K/UL (ref 0–0.2)
BASOPHILS NFR BLD: 1 % (ref 0–2)
BUN SERPL-MCNC: 20 MG/DL (ref 6–23)
CALCIUM SERPL-MCNC: 9.6 MG/DL (ref 8.3–10.4)
CHLORIDE SERPL-SCNC: 110 MMOL/L (ref 98–107)
CO2 SERPL-SCNC: 25 MMOL/L (ref 21–32)
CREAT SERPL-MCNC: 1.05 MG/DL (ref 0.8–1.5)
DIFFERENTIAL METHOD BLD: ABNORMAL
EOSINOPHIL # BLD: 0.1 K/UL (ref 0–0.8)
EOSINOPHIL NFR BLD: 2 % (ref 0.5–7.8)
ERYTHROCYTE [DISTWIDTH] IN BLOOD BY AUTOMATED COUNT: 16.8 % (ref 11.9–14.6)
GLUCOSE BLD STRIP.AUTO-MCNC: 146 MG/DL (ref 65–100)
GLUCOSE SERPL-MCNC: 111 MG/DL (ref 65–100)
HCT VFR BLD AUTO: 35.8 % (ref 41.1–50.3)
HGB BLD-MCNC: 11.2 G/DL (ref 13.6–17.2)
IMM GRANULOCYTES # BLD AUTO: 0 K/UL (ref 0–0.5)
IMM GRANULOCYTES NFR BLD AUTO: 0 % (ref 0–5)
LYMPHOCYTES # BLD: 2.6 K/UL (ref 0.5–4.6)
LYMPHOCYTES NFR BLD: 49 % (ref 13–44)
MCH RBC QN AUTO: 26.1 PG (ref 26.1–32.9)
MCHC RBC AUTO-ENTMCNC: 31.3 G/DL (ref 31.4–35)
MCV RBC AUTO: 83.4 FL (ref 79.6–97.8)
MONOCYTES # BLD: 0.4 K/UL (ref 0.1–1.3)
MONOCYTES NFR BLD: 7 % (ref 4–12)
NEUTS SEG # BLD: 2.2 K/UL (ref 1.7–8.2)
NEUTS SEG NFR BLD: 41 % (ref 43–78)
NRBC # BLD: 0 K/UL (ref 0–0.2)
PLATELET # BLD AUTO: 182 K/UL (ref 150–450)
PMV BLD AUTO: 12 FL (ref 9.4–12.3)
POTASSIUM SERPL-SCNC: 4.2 MMOL/L (ref 3.5–5.1)
RBC # BLD AUTO: 4.29 M/UL (ref 4.23–5.6)
SODIUM SERPL-SCNC: 141 MMOL/L (ref 136–145)
WBC # BLD AUTO: 5.3 K/UL (ref 4.3–11.1)

## 2020-03-30 PROCEDURE — 74011250637 HC RX REV CODE- 250/637: Performed by: HOSPITALIST

## 2020-03-30 PROCEDURE — 97530 THERAPEUTIC ACTIVITIES: CPT

## 2020-03-30 PROCEDURE — 74011250637 HC RX REV CODE- 250/637: Performed by: INTERNAL MEDICINE

## 2020-03-30 PROCEDURE — 65270000029 HC RM PRIVATE

## 2020-03-30 PROCEDURE — 85025 COMPLETE CBC W/AUTO DIFF WBC: CPT

## 2020-03-30 PROCEDURE — 36415 COLL VENOUS BLD VENIPUNCTURE: CPT

## 2020-03-30 PROCEDURE — 80048 BASIC METABOLIC PNL TOTAL CA: CPT

## 2020-03-30 PROCEDURE — 82962 GLUCOSE BLOOD TEST: CPT

## 2020-03-30 PROCEDURE — 74011250636 HC RX REV CODE- 250/636: Performed by: INTERNAL MEDICINE

## 2020-03-30 RX ADMIN — ACETAMINOPHEN 650 MG: 325 TABLET, FILM COATED ORAL at 22:09

## 2020-03-30 RX ADMIN — METOPROLOL SUCCINATE 25 MG: 25 TABLET, FILM COATED, EXTENDED RELEASE ORAL at 08:31

## 2020-03-30 RX ADMIN — ASPIRIN 81 MG 81 MG: 81 TABLET ORAL at 08:31

## 2020-03-30 RX ADMIN — DIPHENHYDRAMINE HYDROCHLORIDE 25 MG: 25 CAPSULE ORAL at 05:22

## 2020-03-30 RX ADMIN — ACETAMINOPHEN 650 MG: 325 TABLET, FILM COATED ORAL at 05:22

## 2020-03-30 RX ADMIN — DIPHENHYDRAMINE HYDROCHLORIDE 25 MG: 25 CAPSULE ORAL at 17:37

## 2020-03-30 RX ADMIN — LISINOPRIL 40 MG: 20 TABLET ORAL at 08:30

## 2020-03-30 RX ADMIN — ATORVASTATIN CALCIUM 80 MG: 80 TABLET, FILM COATED ORAL at 22:09

## 2020-03-30 RX ADMIN — GUAIFENESIN 100 MG: 100 SOLUTION ORAL at 17:37

## 2020-03-30 RX ADMIN — Medication 400 MG: at 08:31

## 2020-03-30 RX ADMIN — GUAIFENESIN 100 MG: 100 SOLUTION ORAL at 05:22

## 2020-03-30 RX ADMIN — ENOXAPARIN SODIUM 40 MG: 40 INJECTION SUBCUTANEOUS at 13:44

## 2020-03-30 RX ADMIN — ACETAMINOPHEN 650 MG: 325 TABLET, FILM COATED ORAL at 13:44

## 2020-03-30 NOTE — PROGRESS NOTES
's follow-up visit to convey care and concern.      Francia Del Rio [facility]  Board Certified

## 2020-03-30 NOTE — PROGRESS NOTES
Problem: Mobility Impaired (Adult and Pediatric)  Goal: *Acute Goals and Plan of Care  Description  GOALS UPDATED ON RE-ASSESSMENT 3/20/2020 (advancements to goals in bold):  1. Patient will perform bed mobility with supervision and 0 verbal cues within 7 treatment days. 2. Patient will perform transfer bed to chair with SUPERVISION within 7 treatment days. 3. Patient will demonstrate fair+ dynamic balance throughout stance phase on L LE within 7 treatment days. 4. Patient will require STAND BY ASSIST throughout swing phase on (to advance) L LE within 7 treatment days. 5. Patient demonstrate 3+/5 strength in L LE hip flexion, hip abduction, and hip adduction within 7 treatment days. 6. Patient will ambulate 200ft+ with STAND BY ASSIST and least retrictive assistive device within 7 treatment days. 7. Patient will perform wheelchair mobility x75ft with MODIFIED INDEPENDENCE within 7 treatment days. PHYSICAL THERAPY: Daily Note and AM 3/30/2020  INPATIENT: PT Visit Days : 6  Payor: MEDICAID PENDING / Plan: [place] PENDING / Product Type: Medicaid /       NAME/AGE/GENDER: Hazel Waldron is a 53 y.o. male   PRIMARY DIAGNOSIS: Acute ischemic stroke ([address] [I63.9]  Cerebrovascular accident (CVA) due to embolism ([address] [G15.7] Acute embolic stroke ([address] Acute embolic stroke ([address]       ICD-10: Treatment Diagnosis:   · Difficulty in walking, Not elsewhere classified (R26.2)  · Hemiplegia and hemiparesis following cerebral infarction affecting left non-dominant side (N23.164)   Precaution/Allergies:  Patient has no known allergies. ASSESSMENT:     Mr. Shanell Cueva is a 53year old admitted R MCA CVA. Patient was supine upon contact and agreeable to PT. Patient performs supine to sit with supervision and heavy use of bed rail on the right. Worked on having patient don sling to LUE. Patient needed mod assist and verbal cues for technique.  Will continue to work on this and make it a goal? Patient transfers to standing with CGA-SBA. Patient then ambulates 250' with luke walker and focus on positioning of L foot during strike (heel first) and stance phase on L to improve external rotation. Patient needed cues periodically throughout gait to improve this and have proper gait sequencing. Wheelchair in tow for safety. Patient returns to his room where he was left sitting up in wheelchair for lunch. Overall good progress towards physical therapy goals. Goals listed above are still appropriate. Will continue efforts as patient is still below functional baseline. This section established at most recent assessment   PROBLEM LIST (Impairments causing functional limitations):  1. Decreased Strength  2. Decreased ADL/Functional Activities  3. Decreased Transfer Abilities  4. Decreased Ambulation Ability/Technique  5. Decreased Balance  6. Increased Pain  7. Decreased Activity Tolerance  8. Increased Fatigue  9. Decreased Flexibility/Joint Mobility   INTERVENTIONS PLANNED: (Benefits and precautions of physical therapy have been discussed with the patient.)  1. Balance Exercise  2. Bed Mobility  3. Family Education  4. Gait Training  5. Home Exercise Program (HEP)  6. Manual Therapy  7. Neuromuscular Re-education/Strengthening  8. Range of Motion (ROM)  9. Therapeutic Activites  10. Therapeutic Exercise/Strengthening  11. Transfer Training     TREATMENT PLAN: Frequency/Duration: 3 times a week for duration of hospital stay  Rehabilitation Potential For Stated Goals: Good     REHAB RECOMMENDATIONS (at time of discharge pending progress):    Placement:   It is my opinion, based on this patient's performance to date, that Mr. Sebastián Ramos may benefit from intensive therapy at an [address] [place] [address] after discharge due to a probable need for close medical supervision by a rehab physician, a probable need for multiple therapy disciplines and potential to make ongoing and sustainable functional improvement that is of practical value. .  Equipment:    TBD pending progress with therapy. HISTORY:   History of Present Injury/Illness (Reason for Referral):  Per H&P: \"Pt is a 54 y/o smoker with DM, HTN, who presented to ER with L leg and arm weakness, L facial droop, dysarthria. First noted L leg weakness late night 12/11 when he woke up to go to the bathroom. He was normal when he went to bed around 1030. Woke up this morning and had persistent weakness L leg and also now noted in L arm. EMS called. Noted to have slurred speech and L facial droop as well. Code S called in ER around 9am.  MRI with acute infarcts in L cerebellar hemisphere, deep frontoparietal white matter, and R paramedian yariel. Also noted old lacunar infarcts. No large vessel occlusion on CTA, but some stenosis noted. No hx afib, TIA, CVA. No CP, palpitations, SOB. \"  Past Medical History/Comorbidities:   Mr. Brianna Mcgarry  has a past medical history of Acute ischemic stroke ([address] (12/12/2019), Acute pancreatitis (11/19/2014), Cerebrovascular accident (CVA) due to embolism ([address] (12/12/2019), Diabetes ([address] (2002), Diabetes ([address] Diabetes mellitus, and Hypertension. He also has no past medical history of Arthritis, Asthma, Autoimmune disease ([address] CAD (coronary artery disease), Cancer ([address] Chronic kidney disease, COPD, Dementia, Dementia ([address] Heart failure ([address] Ill-defined condition, Infectious disease, Liver disease, Other ill-defined conditions(799.89), Psychiatric disorder, PUD (peptic ulcer disease), Seizures ([address] or Sleep disorder. Mr. Brianna Mcgarry  has a past surgical history that includes hx hernia repair and hx orthopaedic.   Social History/Living Environment:   Home Environment: Apartment  # Steps to Enter: 12  Rails to Enter: Yes  [employer] : Bilateral  One/Two Story Residence: One story  Living Alone: No  Support Systems: Spouse/Significant Other/Partner  Patient Expects to be Discharged to[de-identified] Unknown  Current DME Used/Available at Home: None  Tub or Shower Type: Tub/Shower combination  Prior Level of Function/Work/Activity:  Independent, lives with wife in 2nd story 1 level apartment. No recent falls. Number of Personal Factors/Comorbidities that affect the Plan of Care: 1-2: MODERATE COMPLEXITY   EXAMINATION:   Most Recent Physical Functioning:   Gross Assessment:                  Posture:     Balance:  Sitting: Intact  Standing: Impaired  Standing - Static: Good  Standing - Dynamic : Fair Bed Mobility:  Supine to Sit: Supervision(heavy use of bedrail)  Wheelchair Mobility:     Transfers:  Sit to Stand: Contact guard assistance;Stand-by assistance  Stand to Sit: Contact guard assistance;Stand-by assistance  Gait:     Base of Support: Center of gravity altered;Narrowed  Speed/Clotilde: Slow  Step Length: Right shortened;Left shortened  Gait Abnormalities: Hemiplegic  Distance (ft): 250 Feet (ft)(wheelchair follow)  Ambulation - Level of Assistance: Contact guard assistance  Interventions: Safety awareness training; Tactile cues; Verbal cues      Body Structures Involved:  1. Nerves  2. Voice/Speech  3. Bones  4. Joints  5. Muscles Body Functions Affected:  1. Mental  2. Sensory/Pain  3. Neuromusculoskeletal  4. Movement Related Activities and Participation Affected:  1. General Tasks and Demands  2. Mobility  3. Self Care  4. Interpersonal Interactions and Relationships   Number of elements that affect the Plan of Care: 4+: HIGH COMPLEXITY   CLINICAL PRESENTATION:   Presentation: Evolving clinical presentation with changing clinical characteristics: MODERATE COMPLEXITY   CLINICAL DECISION MAKIN [facility] Inpatient Short Form  How much difficulty does the patient currently have. .. Unable A Lot A Little None   1. Turning over in bed (including adjusting bedclothes, sheets and blankets)? [] 1   [] 2   [] 3   [x] 4   2.   Sitting down on and standing up from a chair with arms ( e.g., wheelchair, bedside commode, etc.)   [] 1   [] 2   [x] 3   [] 4   3. Moving from lying on back to sitting on the side of the bed? [] 1   [] 2   [x] 3   [] 4   How much help from another person does the patient currently need. .. Total A Lot A Little None   4. Moving to and from a bed to a chair (including a wheelchair)? [] 1   [] 2   [x] 3   [] 4   5. Need to walk in hospital room? [] 1   [] 2   [x] 3   [] 4   6. Climbing 3-5 steps with a railing? [] 1   [x] 2   [] 3   [] 4   © 2007, Trustees of [address] [place] [address] [address] [id number], under license to [employer]. All rights reserved      Score:  Initial: 13 Most Recent: 18 (Date:3/9/2020)    Interpretation of Tool:  Represents activities that are increasingly more difficult (i.e. Bed mobility, Transfers, Gait). Medical Necessity:     · Patient is expected to demonstrate progress in   · strength, range of motion, balance, coordination, and functional technique  ·  to   · increase independence with all mobility. · .  Reason for Services/Other Comments:  · Patient continues to require skilled intervention due to   · medical complications and mobility deficits which impact his level of function, safety, and independence as indicated above. · .   Use of outcome tool(s) and clinical judgement create a POC that gives a: Questionable prediction of patient's progress: MODERATE COMPLEXITY        TREATMENT:      Pre-treatment Symptoms/Complaints:  none  Pain: Initial: 0/10 Post Session:  0/10     Therapeutic Activity: (    25 Minutes): Therapeutic activities including bed mobility training, transfer training, ambulation on level ground, instruction in sequencing with luke walker, scootiing, donning sling to LUE, stance and swing phase of LLE, and patient education  to improve mobility, strength and balance. Required moderate Safety awareness training; Tactile cues; Verbal cues to promote static and dynamic balance in standing and promote coordination of bilateral, lower extremity(s). Braces/Orthotics/Lines/Etc:   · Sling to LUE  Treatment/Session Assessment:    · Response to Treatment:  See above  · Interdisciplinary Collaboration:   o Physical Therapy Assistant  o Registered Nurse  · After treatment position/precautions:   o Up in chair  o Bed/Chair-wheels locked  o Bed in low position  o Call light within reach  o RN notified   · Compliance with Program/Exercises: Compliant all of the time  · Recommendations/Intent for next treatment session: \"Next visit will focus on advancements to more challenging activities and reduction in assistance provided\".     Total Treatment Duration:  PT Patient Time In/Time Out  Time In: 1055  Time Out: Yancy [address] Haritha, PTA

## 2020-03-30 NOTE — PROGRESS NOTES
Progress Note    Patient: Jade Monge MRN: [id number]  SSN: [id number]    YOB: 1975  Age: 53 y.o. Sex: male      Admit Date: 12/12/2019    LOS: 109 days     Subjective:     Seen and examined. No acute events, since last night. Placement issues, since no insurance. Objective:     Vitals:    03/30/20 0400 03/30/20 0711 03/30/20 1311 03/30/20 1535   BP: 123/76 133/87 116/80 115/76   Pulse: 73 64 71 80   Resp: 16 18 18 18   Temp: 97.9 °F (36.6 °C) 97.9 °F (36.6 °C) 98.2 °F (36.8 °C) 98.6 °F (37 °C)   SpO2: 99% 96% 98% 96%   Weight:       Height:            Intake and Output:  Current Shift: No intake/output data recorded. Last three shifts: No intake/output data recorded. Physical Exam:   GENERAL: alert, cooperative, no distress, appears stated age  LUNG: clear to auscultation bilaterally  HEART: regular rate and rhythm, S1, S2 increased, soft systolic murmur  ABDOMEN: soft, non-tender. Bowel sounds normal. No masses,  no organomegaly  EXTREMITIES:  extremities normal, atraumatic, no cyanosis or edema    Lab/Data Review: All lab results for the last 24 hours reviewed. Assessment:     Principal Problem:    Acute embolic stroke ([facility] (19/96/2938)    Active Problems:    Hypertension ()      Type 2 diabetes mellitus ([facility] ()      Arrhythmia (12/15/2019)      Hyperlipemia (12/15/2019)      PFO (patent foramen ovale) (12/17/2019)      Hypomagnesemia (3/7/2020)        Plan:     S/p Acute Embolic CVA  Hospitalization day #109  Placement issues, due to lack of insurance  Stable, with left hemiplegia, will require placement  - MRI showed acute to early subacute infarcts in multiple territories.   - PFO on TTE, with right to left shunt  - continue ASA and statin  - continue  PT     Hypertension:   Continue with home meds:   Lisinopril 40 mg PO daily  Hydralazine PRN  Continue metoprolol     T2DM:   Continue insulin sliding scale     Hypomagnesemia:   Replaced, and monitor    HLD  Continue atorvastatin     placement: medicaid pending     Medical Decision Making:    Signed By: Peter Irwin MD     March 30, 2020

## 2020-03-30 NOTE — PROGRESS NOTES
Problem: Patient Education: Go to Patient Education Activity  Goal: Patient/Family Education  Outcome: Progressing Towards Goal     Problem: Pressure Injury - Risk of  Goal: *Prevention of pressure injury  Description: Document Dewey Scale and appropriate interventions in the flowsheet. Outcome: Progressing Towards Goal  Note: Pressure Injury Interventions:  Sensory Interventions: Assess changes in LOC    Moisture Interventions: Apply protective barrier, creams and emollients, Check for incontinence Q2 hours and as needed, Limit adult briefs, Maintain skin hydration (lotion/cream), Minimize layers, Moisture barrier, Offer toileting Q_hr    Activity Interventions: Increase time out of bed, Pressure redistribution bed/mattress(bed type), PT/OT evaluation    Mobility Interventions: HOB 30 degrees or less, Pressure redistribution bed/mattress (bed type), PT/OT evaluation, Turn and reposition approx. every two hours(pillow and wedges)    Nutrition Interventions: Document food/fluid/supplement intake    Friction and Shear Interventions: HOB 30 degrees or less, Minimize layers                Problem: Patient Education: Go to Patient Education Activity  Goal: Patient/Family Education  Outcome: Progressing Towards Goal     Problem: Falls - Risk of  Goal: *Absence of Falls  Description: Document Jeanne Fall Risk and appropriate interventions in the flowsheet.   Outcome: Progressing Towards Goal  Note: Fall Risk Interventions:  Mobility Interventions: Bed/chair exit alarm, Communicate number of staff needed for ambulation/transfer, Patient to call before getting OOB, Utilize walker, cane, or other assistive device, Strengthening exercises (ROM-active/passive)    Mentation Interventions: Bed/chair exit alarm, Adequate sleep, hydration, pain control    Medication Interventions: Bed/chair exit alarm, Patient to call before getting OOB, Teach patient to arise slowly    Elimination Interventions: Bed/chair exit alarm, Call light in reach, Patient to call for help with toileting needs, Toileting schedule/hourly rounds, Toilet paper/wipes in reach    History of Falls Interventions: Bed/chair exit alarm, Door open when patient unattended         Problem: Patient Education: Go to Patient Education Activity  Goal: Patient/Family Education  Outcome: Progressing Towards Goal     Problem: General Medical Care Plan  Goal: *Vital signs within specified parameters  Outcome: Progressing Towards Goal  Goal: *Labs within defined limits  Outcome: Progressing Towards Goal  Goal: *Absence of infection signs and symptoms  Description: Wash hand more often   Outcome: Progressing Towards Goal  Goal: *Optimal pain control at patient's stated goal  Outcome: Progressing Towards Goal  Goal: *Skin integrity maintained  Outcome: Progressing Towards Goal  Goal: *Fluid volume balance  Outcome: Progressing Towards Goal  Goal: *Optimize nutritional status  Outcome: Progressing Towards Goal  Goal: *Anxiety reduced or absent  Outcome: Progressing Towards Goal  Goal: *Progressive mobility and function (eg: ADL's)  Outcome: Progressing Towards Goal     Problem: Patient Education: Go to Patient Education Activity  Goal: Patient/Family Education  Outcome: Progressing Towards Goal     Problem: Patient Education: Go to Patient Education Activity  Goal: Patient/Family Education  Outcome: Progressing Towards Goal     Problem: Patient Education: Go to Patient Education Activity  Goal: Patient/Family Education  Outcome: Progressing Towards Goal

## 2020-03-30 NOTE — PROGRESS NOTES
Problem: Patient Education: Go to Patient Education Activity  Goal: Patient/Family Education  Outcome: Progressing Towards Goal     Problem: Pressure Injury - Risk of  Goal: *Prevention of pressure injury  Description: Document Dewey Scale and appropriate interventions in the flowsheet. Outcome: Progressing Towards Goal  Note: Pressure Injury Interventions:  Sensory Interventions: Assess changes in LOC    Moisture Interventions: Absorbent underpads, Check for incontinence Q2 hours and as needed, Limit adult briefs, Minimize layers    Activity Interventions: Increase time out of bed, Pressure redistribution bed/mattress(bed type), PT/OT evaluation    Mobility Interventions: HOB 30 degrees or less, Pressure redistribution bed/mattress (bed type), PT/OT evaluation    Nutrition Interventions: Document food/fluid/supplement intake    Friction and Shear Interventions: HOB 30 degrees or less, Minimize layers                Problem: Patient Education: Go to Patient Education Activity  Goal: Patient/Family Education  Outcome: Progressing Towards Goal     Problem: Falls - Risk of  Goal: *Absence of Falls  Description: Document Jeanne Fall Risk and appropriate interventions in the flowsheet.   Outcome: Progressing Towards Goal  Note: Fall Risk Interventions:  Mobility Interventions: Communicate number of staff needed for ambulation/transfer, OT consult for ADLs, Patient to call before getting OOB, PT Consult for mobility concerns    Mentation Interventions: Bed/chair exit alarm, Adequate sleep, hydration, pain control    Medication Interventions: Patient to call before getting OOB, Teach patient to arise slowly    Elimination Interventions: Call light in reach, Patient to call for help with toileting needs, Stay With Me (per policy), Toilet paper/wipes in reach, Toileting schedule/hourly rounds    History of Falls Interventions: Door open when patient unattended, Investigate reason for fall         Problem: Patient Education: Go to Patient Education Activity  Goal: Patient/Family Education  Outcome: Progressing Towards Goal     Problem: Diabetes Self-Management  Goal: *Disease process and treatment process  Description: Define diabetes and identify own type of diabetes; list 3 options for treating diabetes. Outcome: Progressing Towards Goal  Goal: *Incorporating nutritional management into lifestyle  Description: Describe effect of type, amount and timing of food on blood glucose; list 3 methods for planning meals. Outcome: Progressing Towards Goal  Goal: *Incorporating physical activity into lifestyle  Description: State effect of exercise on blood glucose levels. Outcome: Progressing Towards Goal  Goal: *Developing strategies to promote health/change behavior  Description: Define the ABC's of diabetes; identify appropriate screenings, schedule and personal plan for screenings. Outcome: Progressing Towards Goal  Goal: *Using medications safely  Description: State effect of diabetes medications on diabetes; name diabetes medication taking, action and side effects. Outcome: Progressing Towards Goal  Goal: *Monitoring blood glucose, interpreting and using results  Description: Identify recommended blood glucose targets  and personal targets. Outcome: Progressing Towards Goal  Goal: *Prevention, detection, treatment of acute complications  Description: List symptoms of hyper- and hypoglycemia; describe how to treat low blood sugar and actions for lowering  high blood glucose level. Outcome: Progressing Towards Goal  Goal: *Prevention, detection and treatment of chronic complications  Description: Define the natural course of diabetes and describe the relationship of blood glucose levels to long term complications of diabetes.   Outcome: Progressing Towards Goal  Goal: *Developing strategies to address psychosocial issues  Description: Describe feelings about living with diabetes; identify support needed and support network  Outcome: Progressing Towards Goal     Problem: Patient Education: Go to Patient Education Activity  Goal: Patient/Family Education  Outcome: Progressing Towards Goal     Problem: Patient Education: Go to Patient Education Activity  Goal: Patient/Family Education  Outcome: Progressing Towards Goal     Problem: Nutrition Deficit  Goal: *Optimize nutritional status  Outcome: Progressing Towards Goal     Problem: Patient Education: Go to Patient Education Activity  Goal: Patient/Family Education  Outcome: Progressing Towards Goal     Problem: General Medical Care Plan  Goal: *Vital signs within specified parameters  Outcome: Progressing Towards Goal  Goal: *Labs within defined limits  Outcome: Progressing Towards Goal  Goal: *Absence of infection signs and symptoms  Description: Wash hand more often   Outcome: Progressing Towards Goal  Goal: *Optimal pain control at patient's stated goal  Outcome: Progressing Towards Goal  Goal: *Skin integrity maintained  Outcome: Progressing Towards Goal  Goal: *Fluid volume balance  Outcome: Progressing Towards Goal  Goal: *Optimize nutritional status  Outcome: Progressing Towards Goal  Goal: *Anxiety reduced or absent  Outcome: Progressing Towards Goal  Goal: *Progressive mobility and function (eg: ADL's)  Outcome: Progressing Towards Goal     Problem: Patient Education: Go to Patient Education Activity  Goal: Patient/Family Education  Outcome: Progressing Towards Goal     Problem: Patient Education: Go to Patient Education Activity  Goal: Patient/Family Education  Outcome: Progressing Towards Goal     Problem: Patient Education: Go to Patient Education Activity  Goal: Patient/Family Education  Outcome: Progressing Towards Goal     Problem: Patient Education: Go to Patient Education Activity  Goal: Patient/Family Education  Outcome: Progressing Towards Goal     Problem: Ischemic Stroke: Discharge Outcomes  Goal: *Verbalizes anxiety and depression are reduced or absent  Outcome: Progressing Towards Goal  Goal: *Verbalize understanding of risk factor modification(Stroke Metric)  Outcome: Progressing Towards Goal  Goal: *Hemodynamically stable  Outcome: Progressing Towards Goal  Goal: *Absence of aspiration pneumonia  Outcome: Progressing Towards Goal  Goal: *Aware of needed dietary changes  Outcome: Progressing Towards Goal  Goal: *Verbalize understanding of prescribed medications including anti-coagulants, anti-lipid, and/or anti-platelets(Stroke Metric)  Outcome: Progressing Towards Goal  Goal: *Tolerating diet  Outcome: Progressing Towards Goal  Goal: *Aware of follow-up diagnostics related to anticoagulants  Outcome: Progressing Towards Goal  Goal: *Ability to perform ADLs and demonstrates progressive mobility and function  Outcome: Progressing Towards Goal  Goal: *Absence of DVT(Stroke Metric)  Outcome: Progressing Towards Goal  Goal: *Absence of aspiration  Outcome: Progressing Towards Goal  Goal: *Optimal pain control at patient's stated goal  Outcome: Progressing Towards Goal  Goal: *Home safety concerns addressed  Outcome: Progressing Towards Goal  Goal: *Describes available resources and support systems  Outcome: Progressing Towards Goal  Goal: *Verbalizes understanding of activation of EMS(911) for stroke symptoms(Stroke Metric)  Outcome: Progressing Towards Goal  Goal: *Understands and describes signs and symptoms to report to providers(Stroke Metric)  Outcome: Progressing Towards Goal  Goal: *Neurolgocially stable (absence of additional neurological deficits)  Outcome: Progressing Towards Goal  Goal: *Verbalizes importance of follow-up with primary care physician(Stroke Metric)  Outcome: Progressing Towards Goal  Goal: *Smoking cessation discussed,if applicable(Stroke Metric)  Outcome: Progressing Towards Goal  Goal: *Depression screening completed(Stroke Metric)  Outcome: Progressing Towards Goal     Problem: Pain  Goal: *Control of Pain  Outcome: Progressing Towards Goal     Problem: Patient Education: Go to Patient Education Activity  Goal: Patient/Family Education  Outcome: Progressing Towards Goal     Problem: Patient Education: Go to Patient Education Activity  Goal: Patient/Family Education  Outcome: Progressing Towards Goal

## 2020-03-31 LAB
ANION GAP SERPL CALC-SCNC: 4 MMOL/L (ref 7–16)
BASOPHILS # BLD: 0 K/UL (ref 0–0.2)
BASOPHILS NFR BLD: 1 % (ref 0–2)
BUN SERPL-MCNC: 18 MG/DL (ref 6–23)
CALCIUM SERPL-MCNC: 9.5 MG/DL (ref 8.3–10.4)
CHLORIDE SERPL-SCNC: 110 MMOL/L (ref 98–107)
CO2 SERPL-SCNC: 27 MMOL/L (ref 21–32)
CREAT SERPL-MCNC: 1.18 MG/DL (ref 0.8–1.5)
DIFFERENTIAL METHOD BLD: ABNORMAL
EOSINOPHIL # BLD: 0.1 K/UL (ref 0–0.8)
EOSINOPHIL NFR BLD: 2 % (ref 0.5–7.8)
ERYTHROCYTE [DISTWIDTH] IN BLOOD BY AUTOMATED COUNT: 16.7 % (ref 11.9–14.6)
GLUCOSE BLD STRIP.AUTO-MCNC: 107 MG/DL (ref 65–100)
GLUCOSE BLD STRIP.AUTO-MCNC: 147 MG/DL (ref 65–100)
GLUCOSE BLD STRIP.AUTO-MCNC: 205 MG/DL (ref 65–100)
GLUCOSE SERPL-MCNC: 110 MG/DL (ref 65–100)
HCT VFR BLD AUTO: 35.2 % (ref 41.1–50.3)
HGB BLD-MCNC: 11.3 G/DL (ref 13.6–17.2)
IMM GRANULOCYTES # BLD AUTO: 0 K/UL (ref 0–0.5)
IMM GRANULOCYTES NFR BLD AUTO: 0 % (ref 0–5)
LYMPHOCYTES # BLD: 2.6 K/UL (ref 0.5–4.6)
LYMPHOCYTES NFR BLD: 52 % (ref 13–44)
MCH RBC QN AUTO: 26.2 PG (ref 26.1–32.9)
MCHC RBC AUTO-ENTMCNC: 32.1 G/DL (ref 31.4–35)
MCV RBC AUTO: 81.7 FL (ref 79.6–97.8)
MONOCYTES # BLD: 0.3 K/UL (ref 0.1–1.3)
MONOCYTES NFR BLD: 7 % (ref 4–12)
NEUTS SEG # BLD: 2 K/UL (ref 1.7–8.2)
NEUTS SEG NFR BLD: 39 % (ref 43–78)
NRBC # BLD: 0 K/UL (ref 0–0.2)
PLATELET # BLD AUTO: 177 K/UL (ref 150–450)
PMV BLD AUTO: 11.8 FL (ref 9.4–12.3)
POTASSIUM SERPL-SCNC: 4.1 MMOL/L (ref 3.5–5.1)
RBC # BLD AUTO: 4.31 M/UL (ref 4.23–5.6)
SODIUM SERPL-SCNC: 141 MMOL/L (ref 136–145)
WBC # BLD AUTO: 5.1 K/UL (ref 4.3–11.1)

## 2020-03-31 PROCEDURE — 36415 COLL VENOUS BLD VENIPUNCTURE: CPT

## 2020-03-31 PROCEDURE — 74011250637 HC RX REV CODE- 250/637: Performed by: INTERNAL MEDICINE

## 2020-03-31 PROCEDURE — 85025 COMPLETE CBC W/AUTO DIFF WBC: CPT

## 2020-03-31 PROCEDURE — 65270000029 HC RM PRIVATE

## 2020-03-31 PROCEDURE — 82962 GLUCOSE BLOOD TEST: CPT

## 2020-03-31 PROCEDURE — 97535 SELF CARE MNGMENT TRAINING: CPT

## 2020-03-31 PROCEDURE — 74011250637 HC RX REV CODE- 250/637: Performed by: HOSPITALIST

## 2020-03-31 PROCEDURE — 92507 TX SP LANG VOICE COMM INDIV: CPT

## 2020-03-31 PROCEDURE — 80048 BASIC METABOLIC PNL TOTAL CA: CPT

## 2020-03-31 PROCEDURE — 97110 THERAPEUTIC EXERCISES: CPT

## 2020-03-31 PROCEDURE — 74011250636 HC RX REV CODE- 250/636: Performed by: INTERNAL MEDICINE

## 2020-03-31 RX ADMIN — Medication 400 MG: at 07:50

## 2020-03-31 RX ADMIN — GUAIFENESIN 100 MG: 100 SOLUTION ORAL at 18:15

## 2020-03-31 RX ADMIN — Medication 10 ML: at 21:44

## 2020-03-31 RX ADMIN — ACETAMINOPHEN 650 MG: 325 TABLET, FILM COATED ORAL at 21:43

## 2020-03-31 RX ADMIN — LISINOPRIL 40 MG: 20 TABLET ORAL at 07:50

## 2020-03-31 RX ADMIN — ATORVASTATIN CALCIUM 80 MG: 80 TABLET, FILM COATED ORAL at 21:43

## 2020-03-31 RX ADMIN — ACETAMINOPHEN 650 MG: 325 TABLET, FILM COATED ORAL at 12:48

## 2020-03-31 RX ADMIN — ASPIRIN 81 MG 81 MG: 81 TABLET ORAL at 07:49

## 2020-03-31 RX ADMIN — ENOXAPARIN SODIUM 40 MG: 40 INJECTION SUBCUTANEOUS at 13:37

## 2020-03-31 RX ADMIN — ACETAMINOPHEN 650 MG: 325 TABLET, FILM COATED ORAL at 05:00

## 2020-03-31 RX ADMIN — METOPROLOL SUCCINATE 25 MG: 25 TABLET, FILM COATED, EXTENDED RELEASE ORAL at 07:49

## 2020-03-31 RX ADMIN — DIPHENHYDRAMINE HYDROCHLORIDE 25 MG: 25 CAPSULE ORAL at 18:15

## 2020-03-31 NOTE — PROGRESS NOTES
Problem: Self Care Deficits Care Plan (Adult)  Goal: *Acute Goals and Plan of Care (Insert Text)  Description    Goals per Re-evaluation on 3/18/2020:   1. Patient will demonstrate appropriate safety awareness and protection of L UE during bed mobility and functional transfers with minimal cues. (Progressing, still needs cues, 3/18/2020)  2. Patient will complete total body bathing and dressing with Min A and adaptive equipment as needed. (Progressing, UB bathing Min A, UB dressing at [address] [place] [address] , LB dressing at [facility], 3/18/2020)  3. Patient will complete weightbearing into the L UE with ADL tasks with minimal assistance to improve ability to use as a functional assist during ADL tasks. (Progressing, 3/18/2020)4. Patient will demonstrate L UE SROM HEP within 7 days. (Progressing, 3/18/2020)  5. Pt will complete bed mobility with Mod I and minimal verbal cueing (Progressing, Supervision, 3/25/2020). 6. Pt will complete dynamic sitting balance for ADLs at mod I with good balance (Progressing, 3/18/2020)  7. Pt will complete functional transfers with Supervision with equipment as needed. (Progressing, SBA 3/18/2020)  8. Pt will complete grooming tasks in standing at sink level x5 mins with good balance and equipment as needed (Progressing, CGA with fair balance 3/25/2020)  9. Pt will complete grooming standing at sink level with SBA and use of adaptive equipment as needed. 10. Pt will don/doff UE sling with SBA and use of minimal verbal and visual cueing for correct application (Progressing, Min A 3/25/2020). Goals per Re-evaluation on 3/27/2020:   1. Patient will demonstrate appropriate safety awareness and protection of L UE during bed mobility and functional transfers with no verbal cues. 2. Patient will complete lower body bathing and dressing with Min A and adaptive equipment as needed. 3. Patient will complete upper body bathing and dressing with SBA and adaptive equipment as needed.   4. Patient will complete weightbearing into the L UE with ADL tasks with minimal assistance to improve ability to use as a functional assist during ADL tasks. 5. Patient will demonstrate L UE SROM HEP within 7 days. 6. Pt will complete bed mobility with Mod I and no verbal cueing. 7. Pt will complete functional transfers with Supervision with equipment as needed. 8. Pt will don/doff UE sling with Mod I and no verbal cueing. 9. Pt will complete toileting with SBA for toilet transfer and gown management. Outcome: Progressing Towards Goal     OCCUPATIONAL THERAPY: Daily Note and AM    3/31/2020  INPATIENT: OT Visit Days: 2  Payor: MEDICAID PENDING / Plan: Shanita Summers PENDING / Product Type: Medicaid /      NAME/AGE/GENDER: Austin Lo is a 53 y.o. male   PRIMARY DIAGNOSIS:  Acute ischemic stroke ([facility] [I63.9]  Cerebrovascular accident (CVA) due to embolism ([facility] [A20.5] Acute embolic stroke ([facility] Acute embolic stroke ([facility]       ICD-10: Treatment Diagnosis:    · Generalized Muscle Weakness (M62.81)  · Other lack of cordination (R27.8)  · Hemiplegia and hemiparesis following cerebral infarction affecting   · left non-dominant side (I69.354)  · Abnormal posture (R29.3)   Precautions/Allergies:    NO PULLING ON LUE   LUE in sling with shoulder joint approximated and supported, needs taping   Patient has no known allergies. ASSESSMENT:     Mr. Krys Grimaldo presents to the hospital with L sided weakness and acute ischemic CVA. MRI revealed acute to subacute L cerebellar and R paramedian yariel infarcts. 3/31/2020: Pt supine in bed. Alert and agreeable to work with therapy. Reports 7/10 pain in L forearm. RN notified. Pt completes bed mobility with SBA. Pt with good static and dynamic seated balance. Pt performs PROM to L UE using two-handed technique. Pt requires Min to Mod verbal, visual, tactile, and manual cueing to complete. B UE exercises listed in treatment section of note below.  Pt reports increased pain in PIP joint of 2nd and 4th digits of L hand. Pt requires SBA to complete sit to supine to return to bed to proceed with self-feeding. Head of bed elevated to 90 degrees. Pt requires Min A to self-feed for drink management to remove bottle cap from protein shake and open bottle cap of soda. Pt opens condiments with Mod I with use of mouth and R UE to tear open. Pt is Mod I for utensil management with increased time and use of R hand. Pt left seated upright in bed to finish self-feeding. All needs met and within reach at this time. Pt is making good progress towards achieving acute care goals and is an active participant in therapy session. Will continue POC. This section established at most recent assessment   PROBLEM LIST (Impairments causing functional limitations):  1. Decreased Strength  2. Decreased ADL/Functional Activities  3. Decreased Transfer Abilities  4. Decreased Ambulation Ability/Technique  5. Decreased Balance  6. Decreased Activity Tolerance  7. Increased Fatigue  8. Decreased Flexibility/Joint Mobility  9. Decreased Knowledge of Precautions  10. Decreased [place] with Home Exercise Program   INTERVENTIONS PLANNED: (Benefits and precautions of occupational therapy have been discussed with the patient.)  1. Activities of daily living training  2. Adaptive equipment training  3. Balance training  4. Clothing management  5. Cognitive training  6. Donning&doffing training  7. Keanu tech training  8. Neuromuscular re-eduation  9. Therapeutic activity  10. Therapeutic exercise     TREATMENT PLAN: Frequency/Duration: Follow patient 3 times per week to address above goals. Rehabilitation Potential For Stated Goals: Excellent     REHAB RECOMMENDATIONS (at time of discharge pending progress):    Placement:   It is my opinion, based on this patient's performance to date, that Mr. Latrell Collins may benefit from intensive therapy at an [address] [place] [address] after discharge due to potential to make ongoing and sustainable functional improvement that is of practical value. Yannick Angry Pt functioning far below independent baseline, demonstrating good improvement and participation. Pt would likely benefit greatly and increase independence from inpatient rehab stay. Equipment:    TBD               OCCUPATIONAL PROFILE AND HISTORY:   History of Present Injury/Illness (Reason for Referral):  See H&P  Past Medical History/Comorbidities:   Mr. Aristeo Rascon  has a past medical history of Acute ischemic stroke ([address] (12/12/2019), Acute pancreatitis (11/19/2014), Cerebrovascular accident (CVA) due to embolism ([address] (12/12/2019), Diabetes ([address] (2002), Diabetes ([address] Diabetes mellitus, and Hypertension. He also has no past medical history of Arthritis, Asthma, Autoimmune disease ([address] CAD (coronary artery disease), Cancer ([address] Chronic kidney disease, COPD, Dementia, Dementia ([address] Heart failure ([address] Ill-defined condition, Infectious disease, Liver disease, Other ill-defined conditions(799.89), Psychiatric disorder, PUD (peptic ulcer disease), Seizures ([address] or Sleep disorder. Mr. Aristeo Rascon  has a past surgical history that includes hx hernia repair and hx orthopaedic. Social History/Living Environment:   Home Environment: Apartment  # Steps to Enter: 12  Rails to Enter: Yes  [employer] : Bilateral  One/Two Story Residence: One story  Living Alone: No  Support Systems: Spouse/Significant Other/Partner  Patient Expects to be Discharged to[de-identified] Unknown  Current DME Used/Available at Home: None  Tub or Shower Type: Tub/Shower combination  Prior Level of Function/Work/Activity:  Pt lives at home with his wife. Pt is typically independent with ADL/functional mobility. Pt does not drive. Pt was working part-time at [employer] Personal Factors:          Age:  53 y.o.         Past/Current Experience:  CVA with flaccid L side        Other factors that influence how disability is experienced by the patient:  multiple co-morbidities    Number of Personal Factors/Comorbidities that affect the Plan of Care: Extensive review of physical, cognitive, and psychosocial performance (3+):  HIGH COMPLEXITY   ASSESSMENT OF OCCUPATIONAL PERFORMANCE[de-identified]   Activities of Daily Living:   Basic ADLs (From Assessment) Complex ADLs (From Assessment)   Feeding: Setup  Oral Facial Hygiene/Grooming: Minimum assistance  Bathing: Moderate assistance  Upper Body Dressing: Minimum assistance  Lower Body Dressing: Maximum assistance  Toileting: Moderate assistance Instrumental ADL  Meal Preparation: Total assistance  Homemaking: Total assistance  Medication Management: Total assistance  Financial Management: Total assistance   Grooming/Bathing/Dressing Activities of Daily Living         Upper Body Bathing  Bathing Assistance: Min A   Position Performed: Seated in chair  Feeding  Feeding Assistance: Minimum assistance               Lower Body Dressing Assistance  Socks: Total assistance (dependent) Bed/Mat Mobility  Supine to Sit: Stand-by assistance  Sit to Supine: Stand-by assistance     Most Recent Physical Functioning:   Gross Assessment:                  Posture:     Balance:  Sitting: Intact  Sitting - Static: Good (unsupported)  Sitting - Dynamic: Good (unsupported) Bed Mobility:  Supine to Sit: Stand-by assistance  Sit to Supine: Stand-by assistance  Wheelchair Mobility:     Transfers:               Patient Vitals for the past 6 hrs:   BP SpO2 Pulse   03/31/20 0800 124/81 95 % 69   03/31/20 1200 130/87 96 % 71       Mental Status  Neurologic State: Alert  Orientation Level: Oriented X4  Cognition: Follows commands  Perception: Cues to attend to left side of body, Cues to maintain midline in sitting, Cues to maintain midline in standing, Tactile, Verbal, Visual  Perseveration: No perseveration noted  Safety/Judgement: Fall prevention                          Physical Skills Involved:  1. Range of Motion  2. Balance  3. Strength  4. Activity Tolerance  5.  Fine Motor Control  6. Gross Motor Control Cognitive Skills Affected (resulting in the inability to perform in a timely and safe manner):  1. Perception  2. Expression Psychosocial Skills Affected:  1. Habits/Routines  2. Environmental Adaptation  3. Social Interaction  4. Emotional Regulation  5. Self-Awareness  6. Awareness of Others  7. Social Roles   Number of elements that affect the Plan of Care: 5+:  HIGH COMPLEXITY   CLINICAL DECISION MAKIN[address] [place] [address] AM-PAC 6 Clicks   Daily Activity Inpatient Short Form  How much help from another person does the patient currently need. .. Total A Lot A Little None   1. Putting on and taking off regular lower body clothing? [] 1   [x] 2   [] 3   [] 4   2. Bathing (including washing, rinsing, drying)? [] 1   [] 2   [x] 3   [] 4   3. Toileting, which includes using toilet, bedpan or urinal?   [] 1   [] 2   [x] 3   [] 4   4. Putting on and taking off regular upper body clothing? [] 1   [] 2   [x] 3   [] 4   5. Taking care of personal grooming such as brushing teeth? [] 1   [] 2   [x] 3   [] 4   6. Eating meals? [] 1   [] 2   [x] 3   [] 4   © , Trustees of [address] [place] [address], under license to [employer]. All rights reserved      Score:  Initial: 11 Most Recent: 15 (Date: 3/18/2020 ); 17 (3/27/2020)    Interpretation of Tool:  Represents activities that are increasingly more difficult (i.e. Bed mobility, Transfers, Gait). Medical Necessity:     · Patient demonstrates   · good and excellent  ·  rehab potential due to higher previous functional level. Reason for Services/Other Comments:  · Patient continues to require skilled intervention due to   · Decreased independence with ADL/functional transfers that impacts overall quality of life.    · .   Use of outcome tool(s) and clinical judgement create a POC that gives a: MODERATE COMPLEXITY         TREATMENT:   (In addition to Assessment/Re-Assessment sessions the following treatments were rendered) Pre-treatment Symptoms/Complaints:  \"I'd like to eat sitting in bed. \"  Pain: Initial:   Pain Intensity 1: 7  Pain Location 1: Arm  Pain Orientation 1: Left  Post Session: Unchanged     Therapeutic Exercise: (22 minutes):  Exercises per grid below to improve mobility and strength. Required moderate visual, verbal, manual and tactile cues to promote proper body alignment and promote proper body mechanics. Progressed resistance and repetitions as indicated. Date:  3/31/2020 Date:   Date:     Activity/Exercise Parameters Parameters Parameters   BUE Shoulder Flexion  20 reps     BUE Forward Reaching 20 reps     BUE Elbow Flexion/Extension 20 reps     BUE Pronation/Supination 10 reps each way     BUE Wrist Flexion/Extension 10 reps each way     L PROM Internal/External Rotation  10 reps each way     L PROM Digit Abduction/Aduction 10 reps each way       Self Care: (10 minutes): Procedure(s) utilized to improve and/or restore self-care/home management as related to self feeding. Required minimal verbal and manual cueing to facilitate activities of daily living skills and compensatory activities. Pt requires Min A to self-feed for drink management to remove bottle cap from protein shake and open bottle cap of soda. Pt opens condiments with Mod I with use of mouth and R UE to tear open. Pt is Mod I for utensil management with increased time and use of R hand. Braces/Orthotics/Lines/Etc:   · O2 device: Room air  · LUE sling  · Treatment/Session Assessment:    · Response to Treatment:  Pt is very pleasant and a great participant during therapy sessions.   · Interdisciplinary Collaboration:   o Occupational Therapist  o Registered Nurse  · After treatment position/precautions:   o Supine in bed  o Bed alarm/tab alert on  o Bed/Chair-wheels locked  o Bed in low position  o Call light within reach  o RN notified  o Head of Bed Elevated to 90 degrees to self-feed   · Compliance with Program/Exercises: Compliant all of the time, Will assess as treatment progresses. · Recommendations/Intent for next treatment session: \"Next visit will focus on advancements to more challenging activities and reduction in assistance provided\".   Total Treatment Duration:   OT Patient Time In/Time Out  Time In: 1152  Time Out: 1225     Mary Fairchild

## 2020-03-31 NOTE — PROGRESS NOTES
Neurolinguistic Goals: goals per progress note 3/10/2020  LTG: Patient will increase neuro-linguistic abilities to increase safety and awareness in functional living environment   STG: Patient will solve mathematical problems with 80%accuracy given minimal cueing. Not met 3/10/2020  STG: Patient will name 10 items to  abstract category in 1 minute given  moderate cueing. Progressing 1/31/2020. MET&edited 3/10/2020  STG: Patient will participate in verbal reasoning tasks with 80% accuracy given minimal cueing. Progressing 3/10/2020  STG: Patient will complete mental manipulation tasks with 80% accuracy given minimal cueing. Not met 3/10/2020  STG: Patient will complete working memory/attention tasks with 80% accuracy given minimal cueing. Progressing 3/10/2020  STG: Patient will recall relevant verbal information with 80% accuracy with minimal assistance. Progressing 3/10/2020  STG: Patient will utilize memory compensatory strategies to improve recall of functional list/message with 90%accuracy given minimal assistance. Progressing 3/10/2020     Dysarthria Goals:  LTG: Patient will develop functional and intelligible speech and utilize compensatory strategies to improve communication across environments. STG: Patient will utilize compensatory strategies at conversation level with 90% accuracy independently.  Progressing 3/10/2020      SPEECH LANGUAGE PATHOLOGY: SPEECH-LANGUAGE/COGNITION: Daily Note 5    NAME/AGE/GENDER: Kath Huizar is a 53 y.o. male  DATE: 3/31/2020  PRIMARY DIAGNOSIS: Acute ischemic stroke ([facility] [I63.9]  Cerebrovascular accident (CVA) due to embolism ([facility] [I63.9]      ICD-10: Treatment Diagnosis: R47.1 Dysarthria and Anarthria  R41.841 Cognitive-Communication Deficit    RECOMMENDATIONS   TREATMENT RECOMMENDATIONS:    Continue to follow for cognitive linguistic treatment     STRATEGIES:    Speech strategies: slow rate/over articulate to improve articulatory precision   Memory strategies: repeat and write down novel/relevant information     EDUCATION:  · Recommendations discussed with Patient   Continuation of Skilled Services/Medical Necessity:   Patient is expected to demonstrate progress in expressive communication and cognitive ability to decrease assistance required communication, increase independence with activities of daily living and increase communication with family/caregivers.  Patient continues to require skilled intervention due to cognitive linguistic deficits and ongoing dysarthria      RECOMMENDATIONS for CONTINUED SPEECH THERAPY: YES: Anticipate need for ongoing speech therapy during this hospitalization and at next level of care. ASSESSMENT   Patient with ongoing difficulty with working memory during structured tasks. Deductive reasoning skills also mild-moderately impaired. Recommend ongoing speech therapy services targeting speech intelligibility and cognitive linguistic function during this hospitalization and at next level of care. COMPLIANCE WITH PROGRAM/EXERCISES: Compliant most of the time  REHABILITATION POTENTIAL FOR STATED GOALS: Fair  PLAN    FREQUENCY/DURATION: Continue to follow patient 2 times a week for duration of hospital stay to address above goals. - Recommendations for next treatment session: Next treatment will address cognitive linguistic abilities     SUBJECTIVE   Upright in bed. Orientation:   Person  Place  Time  Situation     Pain: Pain Scale 1: Numeric (0 - 10)  Pain Intensity 1: 0  Pain Location 1: Arm    OBJECTIVE   Deductive reasoning/using process of elimination to determine date being described on calendar: 70% accuracy given moderate verbal cues, Noted difficulty understanding concepts of odd/even numbers and multiple of given number (ie multiples of 2). Immediate/working memory: following moderately complex 2 step directions with 4 components: 3/5 accurate independently. 5/5 accurate when given repetition of items missed. Tool Used: MODIFIED BRITT SCALE (mRS)   Score   No Symptoms  [] 0   No significant disability despite symptoms; able to carry out all usual duties and activities  [] 1   Slight disability; unable to carry out all previous activities but able to look after own affairs without assistance. [] 2   Moderate disability; requiring some help but able to walk without assistance  [x] 3   Moderately severe disability; unable to walk without assistance and unable to attend to own bodily needs without assistance  [] 4   Severe disability; bedridden, incontinent, and requiring constant nursing care and attention  [] 5      Score:  Initial: 3 Current: 3   Interpretation of Tool: The Modified [place] Scale is a 7-point scaled used to quantify level of disability as it relates to a patient's functional abilities. INTERDISCIPLINARY COLLABORATION: Registered Nurse  PRECAUTIONS/ALLERGIES: Patient has no known allergies.      SAFETY:  After treatment position/precautions:  · Supine in bed  · Call light within reach    Total Treatment Duration:     Time In: 2944  Time Out: [address] [facility], [address] [facility]

## 2020-03-31 NOTE — PROGRESS NOTES
Progress Note    Patient: Alanna Boss MRN: [id number]  SSN: [id number]    YOB: 1975  Age: 53 y.o. Sex: male      Admit Date: 12/12/2019    LOS: 110 days     Subjective:     Examined, no acute events,. Patient has been in the hospital after he had an acute stroke. This is hospital day 110, patient not able to be discharged due to placement issues. Objective:     Vitals:    03/31/20 0400 03/31/20 0800 03/31/20 1200 03/31/20 1648   BP: 121/87 124/81 130/87 (!) 136/96   Pulse: 82 69 71 87   Resp: 17 18 18 19   Temp: 98.1 °F (36.7 °C) 98.1 °F (36.7 °C) 98.3 °F (36.8 °C) 98.4 °F (36.9 °C)   SpO2: 96% 95% 96% 99%   Weight:       Height:            Intake and Output:  Current Shift: 03/31 0701 - 03/31 1900  In: -   Out: 153 [Urine:150]  Last three shifts: No intake/output data recorded. Physical Exam:   GENERAL: alert, cooperative, no distress, appears stated age  LUNG: clear to auscultation bilaterally  HEART: regular rate and rhythm, S1, S2 normal, no murmur, click, rub or gallop  ABDOMEN: soft, non-tender. Bowel sounds normal. No masses,  no organomegaly  EXTREMITIES:  extremities normal, atraumatic, no cyanosis or edema  NEUROLOGIC: left hemiplegia    Lab/Data Review: All lab results for the last 24 hours reviewed. Assessment:     Principal Problem:    Acute embolic stroke ([facility] (69/67/9805)    Active Problems:    Hypertension ()      Type 2 diabetes mellitus ([facility] ()      Arrhythmia (12/15/2019)      Hyperlipemia (12/15/2019)      PFO (patent foramen ovale) (12/17/2019)      Hypomagnesemia (3/7/2020)        Plan:     s/p Acute Embolic CVA  Hospitalization day #110  No other issues, since last night  Placement issues, due to lack of insurance  Stable, with left hemiplegia, will require placement  - MRI showed acute to early subacute infarcts in multiple territories.   - PFO on TTE, with right to left shunt  - continue ASA and statin  - continue  PT     Hypertension:   Continue with home meds:   Lisinopril 40 mg PO daily  Hydralazine PRN  Continue metoprolol     T2DM:   Continue insulin sliding scale     Hypomagnesemia:   Replaced, and monitor      HLD  Continue atorvastatin     placement: medicaid pending    Signed By: Abilio Hall MD     March 31, 2020

## 2020-03-31 NOTE — PROGRESS NOTES
Problem: Patient Education: Go to Patient Education Activity  Goal: Patient/Family Education  Outcome: Progressing Towards Goal     Problem: Pressure Injury - Risk of  Goal: *Prevention of pressure injury  Description: Document Dewey Scale and appropriate interventions in the flowsheet. Outcome: Progressing Towards Goal  Note: Pressure Injury Interventions:  Sensory Interventions: Assess changes in LOC    Moisture Interventions: Absorbent underpads, Apply protective barrier, creams and emollients, Check for incontinence Q2 hours and as needed, Limit adult briefs, Maintain skin hydration (lotion/cream), Minimize layers, Moisture barrier, Offer toileting Q_hr    Activity Interventions: Increase time out of bed, Pressure redistribution bed/mattress(bed type), PT/OT evaluation    Mobility Interventions: HOB 30 degrees or less, Pressure redistribution bed/mattress (bed type), PT/OT evaluation, Turn and reposition approx. every two hours(pillow and wedges)    Nutrition Interventions: Document food/fluid/supplement intake    Friction and Shear Interventions: HOB 30 degrees or less, Minimize layers                Problem: Patient Education: Go to Patient Education Activity  Goal: Patient/Family Education  Outcome: Progressing Towards Goal     Problem: Falls - Risk of  Goal: *Absence of Falls  Description: Document Jeanne Fall Risk and appropriate interventions in the flowsheet.   Outcome: Progressing Towards Goal  Note: Fall Risk Interventions:  Mobility Interventions: Bed/chair exit alarm, Communicate number of staff needed for ambulation/transfer, Patient to call before getting OOB, Utilize walker, cane, or other assistive device    Mentation Interventions: Bed/chair exit alarm, Adequate sleep, hydration, pain control    Medication Interventions: Bed/chair exit alarm, Patient to call before getting OOB, Teach patient to arise slowly    Elimination Interventions: Bed/chair exit alarm, Call light in reach, Patient to call for help with toileting needs, Toilet paper/wipes in reach, Toileting schedule/hourly rounds    History of Falls Interventions: Bed/chair exit alarm, Door open when patient unattended         Problem: Patient Education: Go to Patient Education Activity  Goal: Patient/Family Education  Outcome: Progressing Towards Goal     Problem: Diabetes Self-Management  Goal: *Disease process and treatment process  Description: Define diabetes and identify own type of diabetes; list 3 options for treating diabetes. Outcome: Progressing Towards Goal  Goal: *Incorporating nutritional management into lifestyle  Description: Describe effect of type, amount and timing of food on blood glucose; list 3 methods for planning meals. Outcome: Progressing Towards Goal  Goal: *Incorporating physical activity into lifestyle  Description: State effect of exercise on blood glucose levels. Outcome: Progressing Towards Goal  Goal: *Developing strategies to promote health/change behavior  Description: Define the ABC's of diabetes; identify appropriate screenings, schedule and personal plan for screenings. Outcome: Progressing Towards Goal  Goal: *Using medications safely  Description: State effect of diabetes medications on diabetes; name diabetes medication taking, action and side effects. Outcome: Progressing Towards Goal  Goal: *Monitoring blood glucose, interpreting and using results  Description: Identify recommended blood glucose targets  and personal targets. Outcome: Progressing Towards Goal  Goal: *Prevention, detection, treatment of acute complications  Description: List symptoms of hyper- and hypoglycemia; describe how to treat low blood sugar and actions for lowering  high blood glucose level.   Outcome: Progressing Towards Goal  Goal: *Prevention, detection and treatment of chronic complications  Description: Define the natural course of diabetes and describe the relationship of blood glucose levels to long term complications of diabetes.   Outcome: Progressing Towards Goal  Goal: *Developing strategies to address psychosocial issues  Description: Describe feelings about living with diabetes; identify support needed and support network  Outcome: Progressing Towards Goal     Problem: Patient Education: Go to Patient Education Activity  Goal: Patient/Family Education  Outcome: Progressing Towards Goal     Problem: Nutrition Deficit  Goal: *Optimize nutritional status  Outcome: Progressing Towards Goal     Problem: General Medical Care Plan  Goal: *Vital signs within specified parameters  Outcome: Progressing Towards Goal  Goal: *Labs within defined limits  Outcome: Progressing Towards Goal  Goal: *Absence of infection signs and symptoms  Description: Wash hand more often   Outcome: Progressing Towards Goal  Goal: *Optimal pain control at patient's stated goal  Outcome: Progressing Towards Goal  Goal: *Skin integrity maintained  Outcome: Progressing Towards Goal  Goal: *Fluid volume balance  Outcome: Progressing Towards Goal  Goal: *Optimize nutritional status  Outcome: Progressing Towards Goal  Goal: *Anxiety reduced or absent  Outcome: Progressing Towards Goal  Goal: *Progressive mobility and function (eg: ADL's)  Outcome: Progressing Towards Goal     Problem: Patient Education: Go to Patient Education Activity  Goal: Patient/Family Education  Outcome: Progressing Towards Goal     Problem: Patient Education: Go to Patient Education Activity  Goal: Patient/Family Education  Outcome: Progressing Towards Goal     Problem: Patient Education: Go to Patient Education Activity  Goal: Patient/Family Education  Outcome: Progressing Towards Goal     Problem: Patient Education: Go to Patient Education Activity  Goal: Patient/Family Education  Outcome: Progressing Towards Goal     Problem: Pain  Goal: *Control of Pain  Outcome: Progressing Towards Goal     Problem: Patient Education: Go to Patient Education Activity  Goal: Patient/Family Education  Outcome: Progressing Towards Goal

## 2020-04-01 PROCEDURE — 74011250637 HC RX REV CODE- 250/637: Performed by: INTERNAL MEDICINE

## 2020-04-01 PROCEDURE — 97530 THERAPEUTIC ACTIVITIES: CPT

## 2020-04-01 PROCEDURE — 94761 N-INVAS EAR/PLS OXIMETRY MLT: CPT

## 2020-04-01 PROCEDURE — 65270000029 HC RM PRIVATE

## 2020-04-01 PROCEDURE — 74011250636 HC RX REV CODE- 250/636: Performed by: INTERNAL MEDICINE

## 2020-04-01 PROCEDURE — 97164 PT RE-EVAL EST PLAN CARE: CPT

## 2020-04-01 PROCEDURE — 74011250637 HC RX REV CODE- 250/637: Performed by: HOSPITALIST

## 2020-04-01 PROCEDURE — 77010033678 HC OXYGEN DAILY

## 2020-04-01 RX ADMIN — GUAIFENESIN 100 MG: 100 SOLUTION ORAL at 17:32

## 2020-04-01 RX ADMIN — ASPIRIN 81 MG 81 MG: 81 TABLET ORAL at 08:38

## 2020-04-01 RX ADMIN — METOPROLOL SUCCINATE 25 MG: 25 TABLET, FILM COATED, EXTENDED RELEASE ORAL at 08:38

## 2020-04-01 RX ADMIN — ACETAMINOPHEN 650 MG: 325 TABLET, FILM COATED ORAL at 05:47

## 2020-04-01 RX ADMIN — ENOXAPARIN SODIUM 40 MG: 40 INJECTION SUBCUTANEOUS at 14:52

## 2020-04-01 RX ADMIN — Medication 400 MG: at 08:38

## 2020-04-01 RX ADMIN — ATORVASTATIN CALCIUM 80 MG: 80 TABLET, FILM COATED ORAL at 20:31

## 2020-04-01 RX ADMIN — ACETAMINOPHEN 650 MG: 325 TABLET, FILM COATED ORAL at 20:30

## 2020-04-01 RX ADMIN — DIPHENHYDRAMINE HYDROCHLORIDE 25 MG: 25 CAPSULE ORAL at 17:32

## 2020-04-01 RX ADMIN — LISINOPRIL 40 MG: 20 TABLET ORAL at 08:38

## 2020-04-01 RX ADMIN — ACETAMINOPHEN 650 MG: 325 TABLET, FILM COATED ORAL at 14:51

## 2020-04-01 RX ADMIN — Medication 10 ML: at 05:48

## 2020-04-01 RX ADMIN — ACETAMINOPHEN 650 MG: 325 TABLET, FILM COATED ORAL at 08:38

## 2020-04-01 NOTE — PROGRESS NOTES
JUNI spoke with Tri and learned that pt's disability determination is still pending. She confirmed that all information has been provided to the [facility] and all we can do now is to wait on the determination. She stated there is no one to contact or no way to request that the case be escalated. Pt has been approved for medicaid through the [employer] [facility] [employer] program but this will not provide funding for nursing home or rehab placement. JUNI will continue to follow.

## 2020-04-01 NOTE — PROGRESS NOTES
Progress Note    Patient: Edilberto Dos Santos MRN: [id number]  SSN: [id number]    YOB: 1975  Age: 53 y.o. Sex: male      Admit Date: 12/12/2019    LOS: 111 days     Subjective:     Pt is a 49yo M with hx tobacco abuse, HTN, DM who presented on 12/12/19 with L hemiplegia, dysarthria. Outside of TPA window. Found to have acute embolic stroke with acute to early subacute infarcts in multiple territories on MRI. PFO noted on TTE with R-L shunt. Hospitalization has been prolonged by placement issues, he continues to work with PT and OT.    4/1: Cheryl Lolidarron. Continues to work with PT.       Objective:     Vitals:    03/31/20 1648 03/31/20 2000 04/01/20 0000 04/01/20 0400   BP: (!) 136/96 137/89 122/90 122/84   Pulse: 87 70 75 75   Resp: 19 14 18 15   Temp: 98.4 °F (36.9 °C) 98.4 °F (36.9 °C) 97.6 °F (36.4 °C) 98 °F (36.7 °C)   SpO2: 99% 100% 97% 97%   Weight:       Height:            Intake and Output:  Current Shift: No intake/output data recorded. Last three shifts: 03/30 1901 - 04/01 0700  In: -   Out: 153 [Urine:150]    Physical Exam:   GENERAL: alert, cooperative, no distress, appears stated age  LUNG: clear to auscultation bilaterally  HEART: regular rate and rhythm, S1, S2 normal, no murmur, click, rub or gallop  ABDOMEN: soft, non-tender. Bowel sounds normal. No masses,  no organomegaly  EXTREMITIES:  extremities normal, atraumatic, no cyanosis or edema  NEUROLOGIC: left hemiplegia    Lab/Data Review: All lab results for the last 24 hours reviewed.        Assessment:     Principal Problem:    Acute embolic stroke ([facility] (45/69/8339)    Active Problems:    Hypertension ()      Type 2 diabetes mellitus ([facility] ()      Arrhythmia (12/15/2019)      Hyperlipemia (12/15/2019)      PFO (patent foramen ovale) (12/17/2019)      Hypomagnesemia (3/7/2020)        Plan:     s/p Acute Embolic CVA  Hospitalization day 111  Placement issues, due to lack of insurance  Stable, with left hemiplegia, will require placement  - MRI showed acute to early subacute infarcts in multiple territories. - PFO on TTE, with right to left shunt  - continue ASA and statin  - continue  PT     Hypertension:   Continue with home meds:   Lisinopril 40 mg PO daily  Hydralazine PRN  Continue metoprolol     T2DM:   Previously tapered and discontinued metformin/ssi.   Some elevated sugars yesterday - may consider restart metformin     HLD  Continue atorvastatin     placement: medicaid pending    Signed By: Susy Mckinney MD     April 1, 2020

## 2020-04-01 NOTE — PROGRESS NOTES
Problem: Mobility Impaired (Adult and Pediatric)  Goal: *Acute Goals and Plan of Care  Description  GOALS UPDATED ON RE-ASSESSMENT 4/1/2020 (advancements to goals in bold):  1. Patient will perform bed mobility with supervision and 0 verbal cues within 7 treatment days. 2. Patient will perform transfer bed to chair with SUPERVISION within 7 treatment days. 3. Patient will demonstrate fair+ dynamic balance throughout stance phase on L LE within 7 treatment days. 4. Patient will require STAND BY ASSIST throughout swing phase on (to advance) L LE within 7 treatment days. 5. Patient demonstrate 3+/5 strength in L LE hip flexion, hip abduction, and hip adduction within 7 treatment days. 6. Patient will ambulate 200ft+ with STAND BY ASSIST and least retrictive assistive device within 7 treatment days. 7. Patient will perform wheelchair mobility x75ft with MODIFIED INDEPENDENCE within 7 treatment days. 8. Mr. Sherine Joshi will be independent with wheelchair locking/unlocking mechanism as well as leg rest manipulation within 7 days to improve safety and independence. 9. Mr. Sherine Joshi will don/doff LUE sling for protection of L shoulder during transfers/gait within 7 treatment days. PHYSICAL THERAPY: Daily Note, Re-evaluation and PM 4/1/2020  INPATIENT: PT Visit Days : 1  Payor: MEDICAID PENDING / Plan: Nadir Newell PENDING / Product Type: Medicaid /       NAME/AGE/GENDER: Calin Yen is a 53 y.o. male   PRIMARY DIAGNOSIS: Acute ischemic stroke ([address] [I63.9]  Cerebrovascular accident (CVA) due to embolism ([address] [C61.0] Acute embolic stroke ([address] Acute embolic stroke ([address]       ICD-10: Treatment Diagnosis:   · Difficulty in walking, Not elsewhere classified (R26.2)  · Hemiplegia and hemiparesis following cerebral infarction affecting left non-dominant side (Z39.491)   Precaution/Allergies:  Patient has no known allergies. ASSESSMENT:     Mr. Sherine Joshi is a 53year old admitted R MCA CVA.   Patient was supine upon contact and agreeable to PT. Patient performs supine to sit with supervision and heavy use of bed rail on the right. Worked on having patient don sling to LUE. Patient needed mod assist and verbal cues for technique. Patient transfers to standing with CGA-SBA. Patient then ambulates 300' with luke walker and focus on positioning of L foot during strike (heel first) and stance phase on L to improve external rotation. He required a seated rest break as his LLE fatigued resulting in decreased L knee control and hyperextension, although improved with only 2 instances of knee hyperextention. Patient needed cues periodically throughout gait to improve this and have proper gait sequencing. Wheelchair in tow for safety. He was able to bend down to lock wheelchair on R side when sitting to rest during gait with CGA. One loss of balance requiring assist to maintain when performing sit to stand from wheelchair. Overall good progress towards physical therapy goals. Goals have been updated to reflect patient's current functional status. Will continue efforts as patient is still below functional baseline. This section established at most recent assessment   PROBLEM LIST (Impairments causing functional limitations):  1. Decreased Strength  2. Decreased ADL/Functional Activities  3. Decreased Transfer Abilities  4. Decreased Ambulation Ability/Technique  5. Decreased Balance  6. Increased Pain  7. Decreased Activity Tolerance  8. Increased Fatigue  9. Decreased Flexibility/Joint Mobility   INTERVENTIONS PLANNED: (Benefits and precautions of physical therapy have been discussed with the patient.)  1. Balance Exercise  2. Bed Mobility  3. Family Education  4. Gait Training  5. Home Exercise Program (HEP)  6. Manual Therapy  7. Neuromuscular Re-education/Strengthening  8. Range of Motion (ROM)  9. Therapeutic Activites  10. Therapeutic Exercise/Strengthening  11.  Transfer Training     TREATMENT PLAN: Frequency/Duration: 3 times a week for duration of hospital stay  Rehabilitation Potential For Stated Goals: Good     REHAB RECOMMENDATIONS (at time of discharge pending progress):    Placement: It is my opinion, based on this patient's performance to date, that Mr. Wendy Fischer may benefit from intensive therapy at an [address] [place] [address] after discharge due to a probable need for close medical supervision by a rehab physician, a probable need for multiple therapy disciplines and potential to make ongoing and sustainable functional improvement that is of practical value. .  Equipment:    TBD pending progress with therapy. HISTORY:   History of Present Injury/Illness (Reason for Referral):  Per H&P: \"Pt is a 54 y/o smoker with DM, HTN, who presented to ER with L leg and arm weakness, L facial droop, dysarthria. First noted L leg weakness late night 12/11 when he woke up to go to the bathroom. He was normal when he went to bed around 1030. Woke up this morning and had persistent weakness L leg and also now noted in L arm. EMS called. Noted to have slurred speech and L facial droop as well. Code S called in ER around 9am.  MRI with acute infarcts in L cerebellar hemisphere, deep frontoparietal white matter, and R paramedian yariel. Also noted old lacunar infarcts. No large vessel occlusion on CTA, but some stenosis noted. No hx afib, TIA, CVA. No CP, palpitations, SOB. \"  Past Medical History/Comorbidities:   Mr. Wendy Fischer  has a past medical history of Acute ischemic stroke ([address] (12/12/2019), Acute pancreatitis (11/19/2014), Cerebrovascular accident (CVA) due to embolism ([address] (12/12/2019), Diabetes ([address] (2002), Diabetes ([address] Diabetes mellitus, and Hypertension.  He also has no past medical history of Arthritis, Asthma, Autoimmune disease ([address] CAD (coronary artery disease), Cancer ([address] Chronic kidney disease, COPD, Dementia, Dementia ([address] Heart failure ([address] Ill-defined condition, Infectious disease, Liver disease, Other ill-defined conditions(799.89), Psychiatric disorder, PUD (peptic ulcer disease), Seizures ([facility] or Sleep disorder. Mr. Osmar Davey  has a past surgical history that includes hx hernia repair and hx orthopaedic. Social History/Living Environment:   Home Environment: Apartment  # Steps to Enter: 12  Rails to Enter: Yes  [employer] : Bilateral  One/Two Story Residence: One story  Living Alone: No  Support Systems: Spouse/Significant Other/Partner  Patient Expects to be Discharged to[de-identified] Unknown  Current DME Used/Available at Home: None  Tub or Shower Type: Tub/Shower combination  Prior Level of Function/Work/Activity:  Independent, lives with wife in 2nd story 1 level apartment. No recent falls. Number of Personal Factors/Comorbidities that affect the Plan of Care: 1-2: MODERATE COMPLEXITY   EXAMINATION:   Most Recent Physical Functioning:   Gross Assessment:                  Posture:     Balance:  Sitting: Intact  Standing: Impaired  Standing - Static: Good  Standing - Dynamic : Fair Bed Mobility:  Supine to Sit: Supervision  Sit to Supine: Stand-by assistance  Scooting: Stand-by assistance  Wheelchair Mobility:     Transfers:  Sit to Stand: Contact guard assistance  Stand to Sit: Contact guard assistance  Bed to Chair: Contact guard assistance  Interventions: Safety awareness training;Verbal cues; Visual cues  Gait:     Base of Support: Narrowed; Center of gravity altered  Speed/Clotilde: Slow  Step Length: Right shortened;Left shortened  Gait Abnormalities: Hemiplegic  Distance (ft): 300 Feet (ft)(wheelchair follow)  Ambulation - Level of Assistance: Contact guard assistance  Interventions: Safety awareness training;Verbal cues; Visual/Demos      Body Structures Involved:  1. Nerves  2. Voice/Speech  3. Bones  4. Joints  5. Muscles Body Functions Affected:  1. Mental  2. Sensory/Pain  3. Neuromusculoskeletal  4. Movement Related Activities and Participation Affected:  1.  General Tasks and Demands  2. Mobility  3. Self Care  4. Interpersonal Interactions and Relationships   Number of elements that affect the Plan of Care: 4+: HIGH COMPLEXITY   CLINICAL PRESENTATION:   Presentation: Evolving clinical presentation with changing clinical characteristics: MODERATE COMPLEXITY   CLINICAL DECISION MAKIN [facility] Short Form  How much difficulty does the patient currently have. .. Unable A Lot A Little None   1. Turning over in bed (including adjusting bedclothes, sheets and blankets)? [] 1   [] 2   [] 3   [x] 4   2. Sitting down on and standing up from a chair with arms ( e.g., wheelchair, bedside commode, etc.)   [] 1   [] 2   [x] 3   [] 4   3. Moving from lying on back to sitting on the side of the bed? [] 1   [] 2   [x] 3   [] 4   How much help from another person does the patient currently need. .. Total A Lot A Little None   4. Moving to and from a bed to a chair (including a wheelchair)? [] 1   [] 2   [x] 3   [] 4   5. Need to walk in hospital room? [] 1   [] 2   [x] 3   [] 4   6. Climbing 3-5 steps with a railing? [] 1   [x] 2   [] 3   [] 4   © , Trustees of [facility] [employer], under license to [employer]. All rights reserved      Score:  Initial: 13 Most Recent: 18 (Date:3/9/2020)    Interpretation of Tool:  Represents activities that are increasingly more difficult (i.e. Bed mobility, Transfers, Gait). Medical Necessity:     · Patient is expected to demonstrate progress in   · strength, range of motion, balance, coordination, and functional technique  ·  to   · increase independence with all mobility. · .  Reason for Services/Other Comments:  · Patient continues to require skilled intervention due to   · medical complications and mobility deficits which impact his level of function, safety, and independence as indicated above.    · .   Use of outcome tool(s) and clinical judgement create a POC that gives a: Questionable prediction of patient's progress: MODERATE COMPLEXITY        TREATMENT:      Pre-treatment Symptoms/Complaints:  none  Pain: Initial: 0/10 Post Session:  0/10     Therapeutic Activity: (    25 Minutes): Therapeutic activities including bed mobility training, transfer training, ambulation on level ground, instruction in sequencing with luke walker, scootiing, donning sling to LUE, stance and swing phase of LLE, and patient education  to improve mobility, strength and balance. Required moderate Safety awareness training;Verbal cues; Visual/Demos to promote static and dynamic balance in standing and promote coordination of bilateral, lower extremity(s). Braces/Orthotics/Lines/Etc:   · Sling to LUE  · Ace wrap applied to L ankle to assist in dorsiflexion  Treatment/Session Assessment:    · Response to Treatment:  See above  · Interdisciplinary Collaboration:   o Physical Therapist  o Registered Nurse  · After treatment position/precautions:   o Supine in bed  o Bed/Chair-wheels locked  o Bed in low position  o Call light within reach  o RN notified   · Compliance with Program/Exercises: Compliant all of the time  · Recommendations/Intent for next treatment session: \"Next visit will focus on advancements to more challenging activities and reduction in assistance provided\".     Total Treatment Duration:  PT Patient Time In/Time Out  Time In: 1340  Time Out: 1421    Herb Kaur, PT, DPT

## 2020-04-02 LAB
GLUCOSE BLD STRIP.AUTO-MCNC: 116 MG/DL (ref 65–100)
GLUCOSE BLD STRIP.AUTO-MCNC: 159 MG/DL (ref 65–100)

## 2020-04-02 PROCEDURE — 74011250637 HC RX REV CODE- 250/637: Performed by: HOSPITALIST

## 2020-04-02 PROCEDURE — 74011250637 HC RX REV CODE- 250/637: Performed by: INTERNAL MEDICINE

## 2020-04-02 PROCEDURE — 92507 TX SP LANG VOICE COMM INDIV: CPT

## 2020-04-02 PROCEDURE — 74011250636 HC RX REV CODE- 250/636: Performed by: INTERNAL MEDICINE

## 2020-04-02 PROCEDURE — 82962 GLUCOSE BLOOD TEST: CPT

## 2020-04-02 PROCEDURE — 65270000029 HC RM PRIVATE

## 2020-04-02 PROCEDURE — 97530 THERAPEUTIC ACTIVITIES: CPT

## 2020-04-02 RX ORDER — METFORMIN HYDROCHLORIDE 500 MG/1
500 TABLET ORAL
Status: DISCONTINUED | OUTPATIENT
Start: 2020-04-03 | End: 2020-04-16

## 2020-04-02 RX ADMIN — ACETAMINOPHEN 650 MG: 325 TABLET, FILM COATED ORAL at 05:06

## 2020-04-02 RX ADMIN — ENOXAPARIN SODIUM 40 MG: 40 INJECTION SUBCUTANEOUS at 17:23

## 2020-04-02 RX ADMIN — ATORVASTATIN CALCIUM 80 MG: 80 TABLET, FILM COATED ORAL at 21:50

## 2020-04-02 RX ADMIN — GUAIFENESIN 100 MG: 100 SOLUTION ORAL at 17:26

## 2020-04-02 RX ADMIN — LISINOPRIL 40 MG: 20 TABLET ORAL at 08:42

## 2020-04-02 RX ADMIN — METOPROLOL SUCCINATE 25 MG: 25 TABLET, FILM COATED, EXTENDED RELEASE ORAL at 08:42

## 2020-04-02 RX ADMIN — ACETAMINOPHEN 650 MG: 325 TABLET, FILM COATED ORAL at 21:51

## 2020-04-02 RX ADMIN — ACETAMINOPHEN 650 MG: 325 TABLET, FILM COATED ORAL at 17:23

## 2020-04-02 RX ADMIN — Medication 400 MG: at 08:42

## 2020-04-02 RX ADMIN — DIPHENHYDRAMINE HYDROCHLORIDE 25 MG: 25 CAPSULE ORAL at 17:26

## 2020-04-02 RX ADMIN — ASPIRIN 81 MG 81 MG: 81 TABLET ORAL at 08:42

## 2020-04-02 NOTE — PROGRESS NOTES
Problem: Mobility Impaired (Adult and Pediatric)  Goal: *Acute Goals and Plan of Care  Description  GOALS UPDATED ON RE-ASSESSMENT 4/1/2020 (advancements to goals in bold):  1. Patient will perform bed mobility with supervision and 0 verbal cues within 7 treatment days. 2. Patient will perform transfer bed to chair with SUPERVISION within 7 treatment days. 3. Patient will demonstrate fair+ dynamic balance throughout stance phase on L LE within 7 treatment days. 4. Patient will require STAND BY ASSIST throughout swing phase on (to advance) L LE within 7 treatment days. 5. Patient demonstrate 3+/5 strength in L LE hip flexion, hip abduction, and hip adduction within 7 treatment days. 6. Patient will ambulate 200ft+ with STAND BY ASSIST and least retrictive assistive device within 7 treatment days. 7. Patient will perform wheelchair mobility x75ft with MODIFIED INDEPENDENCE within 7 treatment days. 8. Mr. Joanne Beach will be independent with wheelchair locking/unlocking mechanism as well as leg rest manipulation within 7 days to improve safety and independence. 9. Mr. Joanne Beach will don/doff LUE sling for protection of L shoulder during transfers/gait within 7 treatment days. PHYSICAL THERAPY: Daily Note and PM 4/2/2020  INPATIENT: PT Visit Days : 2  Payor: MEDICAID PENDING / Plan: Yvette Garcia PENDING / Product Type: Medicaid /       NAME/AGE/GENDER: Mikey Stephen is a 53 y.o. male   PRIMARY DIAGNOSIS: Acute ischemic stroke ([address] [I63.9]  Cerebrovascular accident (CVA) due to embolism ([address] [D79.3] Acute embolic stroke ([address] Acute embolic stroke ([address]       ICD-10: Treatment Diagnosis:   · Difficulty in walking, Not elsewhere classified (R26.2)  · Hemiplegia and hemiparesis following cerebral infarction affecting left non-dominant side (L44.656)   Precaution/Allergies:  Patient has no known allergies. ASSESSMENT:     Mr. Joanne Beach is a 53year old admitted R MCA CVA.   Patient seen this PM for PT treatment session: present supine in bed, endorses 0/10 pain, and motivated to participate. Performed bed mobility with supervision and 1 verbal cue for scooting and demonstrated good dynamic sitting balance while participating in donning L UE sling. Sit to stand with SBA and ambulation [id number] with CGA, gait belt, ace wrap L foot into dorsiflexion + inversion, and wheelchair follow. Continues to demonstrate a hemiplegic gait pattern on the left with improved quad activation. Required cues for glute activation, step clearance, and to prevent external rotation of LE. Patient responds well to verbal cues and able to improve mechanics. Mechanics decline with fatigue. Patient demonstrates improved L LE strength to bring legs back up onto bed. Participated in wheelchair mechanics, dual tasking, and transferring from various heights. At end of treatment session addressed bridging and positioning of L UE. Ulises Evelyn participation today with gait mechanics improving. Patient would continue to benefit from multidisciplinary rehabilitation program to optimize functioning. Will continue to follow with stated plan of care. This section established at most recent assessment   PROBLEM LIST (Impairments causing functional limitations):  1. Decreased Strength  2. Decreased ADL/Functional Activities  3. Decreased Transfer Abilities  4. Decreased Ambulation Ability/Technique  5. Decreased Balance  6. Increased Pain  7. Decreased Activity Tolerance  8. Increased Fatigue  9. Decreased Flexibility/Joint Mobility   INTERVENTIONS PLANNED: (Benefits and precautions of physical therapy have been discussed with the patient.)  1. Balance Exercise  2. Bed Mobility  3. Family Education  4. Gait Training  5. Home Exercise Program (HEP)  6. Manual Therapy  7. Neuromuscular Re-education/Strengthening  8. Range of Motion (ROM)  9. Therapeutic Activites  10. Therapeutic Exercise/Strengthening  11.  Transfer Training     TREATMENT PLAN: Frequency/Duration: 3 times a week for duration of hospital stay  Rehabilitation Potential For Stated Goals: Good     REHAB RECOMMENDATIONS (at time of discharge pending progress):    Placement: It is my opinion, based on this patient's performance to date, that Mr. Papito Ellison may benefit from intensive therapy at an [address] [place] [address] after discharge due to a probable need for close medical supervision by a rehab physician, a probable need for multiple therapy disciplines and potential to make ongoing and sustainable functional improvement that is of practical value. .  Equipment:    TBD pending progress with therapy. HISTORY:   History of Present Injury/Illness (Reason for Referral):  Per H&P: \"Pt is a 52 y/o smoker with DM, HTN, who presented to ER with L leg and arm weakness, L facial droop, dysarthria. First noted L leg weakness late night 12/11 when he woke up to go to the bathroom. He was normal when he went to bed around 1030. Woke up this morning and had persistent weakness L leg and also now noted in L arm. EMS called. Noted to have slurred speech and L facial droop as well. Code S called in ER around 9am.  MRI with acute infarcts in L cerebellar hemisphere, deep frontoparietal white matter, and R paramedian yariel. Also noted old lacunar infarcts. No large vessel occlusion on CTA, but some stenosis noted. No hx afib, TIA, CVA. No CP, palpitations, SOB. \"  Past Medical History/Comorbidities:   Mr. Papito Ellison  has a past medical history of Acute ischemic stroke ([address] (12/12/2019), Acute pancreatitis (11/19/2014), Cerebrovascular accident (CVA) due to embolism ([address] (12/12/2019), Diabetes ([address] (2002), Diabetes ([address] Diabetes mellitus, and Hypertension.  He also has no past medical history of Arthritis, Asthma, Autoimmune disease ([address] CAD (coronary artery disease), Cancer ([address] Chronic kidney disease, COPD, Dementia, Dementia ([address] Heart failure ([address] Ill-defined condition, Infectious disease, Liver disease, Other ill-defined conditions(799.89), Psychiatric disorder, PUD (peptic ulcer disease), Seizures ([facility] or Sleep disorder. Mr. Davey Stein  has a past surgical history that includes hx hernia repair and hx orthopaedic. Social History/Living Environment:   Home Environment: Apartment  # Steps to Enter: 12  Rails to Enter: Yes  [employer] : Bilateral  One/Two Story Residence: One story  Living Alone: No  Support Systems: Spouse/Significant Other/Partner  Patient Expects to be Discharged to[de-identified] Unknown  Current DME Used/Available at Home: None  Tub or Shower Type: Tub/Shower combination  Prior Level of Function/Work/Activity:  Independent, lives with wife in 2nd story 1 level apartment. No recent falls. Number of Personal Factors/Comorbidities that affect the Plan of Care: 1-2: MODERATE COMPLEXITY   EXAMINATION:   Most Recent Physical Functioning:   Gross Assessment:  AROM: Generally decreased, functional  PROM: Within functional limits  Strength: Generally decreased, functional  Coordination: Generally decreased, functional  Tone: Abnormal  Sensation: Intact               Posture:     Balance:  Sitting: Intact  Sitting - Static: Good (unsupported)  Sitting - Dynamic: Good (unsupported)  Standing: Impaired  Standing - Static: Good  Standing - Dynamic : Fair Bed Mobility:  Supine to Sit: Supervision; Additional time  Sit to Supine: Stand-by assistance  Scooting: Stand-by assistance  Wheelchair Mobility:     Transfers:  Sit to Stand: Contact guard assistance;Stand-by assistance  Stand to Sit: Contact guard assistance  Bed to Chair: Contact guard assistance  Interventions: Safety awareness training  Gait:     Base of Support: Narrowed; Center of gravity altered  Step Length: Right shortened  Swing Pattern: Left asymmetrical  Gait Abnormalities: Decreased step clearance; Hemiplegic  Distance (ft): 300 Feet (ft)(with close CGA; verbal cues, 1 sand WC follow with gait belt)  Ambulation - Level of Assistance: Contact guard assistance  Interventions: Safety awareness training; Tactile cues; Verbal cues      Body Structures Involved:  1. Nerves  2. Voice/Speech  3. Bones  4. Joints  5. Muscles Body Functions Affected:  1. Mental  2. Sensory/Pain  3. Neuromusculoskeletal  4. Movement Related Activities and Participation Affected:  1. General Tasks and Demands  2. Mobility  3. Self Care  4. Interpersonal Interactions and Relationships   Number of elements that affect the Plan of Care: 4+: HIGH COMPLEXITY   CLINICAL PRESENTATION:   Presentation: Evolving clinical presentation with changing clinical characteristics: MODERATE COMPLEXITY   CLINICAL DECISION MAKIN [facility] Inpatient Short Form  How much difficulty does the patient currently have. .. Unable A Lot A Little None   1. Turning over in bed (including adjusting bedclothes, sheets and blankets)? [] 1   [] 2   [] 3   [x] 4   2. Sitting down on and standing up from a chair with arms ( e.g., wheelchair, bedside commode, etc.)   [] 1   [] 2   [x] 3   [] 4   3. Moving from lying on back to sitting on the side of the bed? [] 1   [] 2   [x] 3   [] 4   How much help from another person does the patient currently need. .. Total A Lot A Little None   4. Moving to and from a bed to a chair (including a wheelchair)? [] 1   [] 2   [x] 3   [] 4   5. Need to walk in hospital room? [] 1   [] 2   [x] 3   [] 4   6. Climbing 3-5 steps with a railing? [] 1   [x] 2   [] 3   [] 4   © , Trustees of [address] [place], [place] [id number], under license to [employer]. All rights reserved      Score:  Initial: 13 Most Recent: 18 (Date:3/9/2020)    Interpretation of Tool:  Represents activities that are increasingly more difficult (i.e. Bed mobility, Transfers, Gait).     Medical Necessity:     · Patient is expected to demonstrate progress in   · strength, range of motion, balance, coordination, and functional technique  ·  to · increase independence with all mobility. · .  Reason for Services/Other Comments:  · Patient continues to require skilled intervention due to   · medical complications and mobility deficits which impact his level of function, safety, and independence as indicated above. · .   Use of outcome tool(s) and clinical judgement create a POC that gives a: Questionable prediction of patient's progress: MODERATE COMPLEXITY        TREATMENT:      Pre-treatment Symptoms/Complaints:  none  Pain: Initial: 0/10 Post Session:  0/10     Therapeutic Activity: (    55 Minutes): Therapeutic activities including bed mobility training, positioning, transfer training, ambulation on level ground, instruction in sequencing with luke walker, scootiing, donning sling to LUE, stance and swing phase of LLE, wheelchair mechanics, dual tasking, dynamic standing balance activity throughout ADL activity, and patient education  to improve mobility, strength and balance. Required moderate Safety awareness training; Tactile cues; Verbal cues to promote static and dynamic balance in standing and promote coordination of bilateral, lower extremity(s). Braces/Orthotics/Lines/Etc:   · Sling to LUE  · Ace wrap applied to L ankle to assist in dorsiflexion/inversion  Treatment/Session Assessment:    · Response to Treatment:  See above  · Interdisciplinary Collaboration:   o Physical Therapist  o Rehabilitation Attendant  · After treatment position/precautions:   o Supine in bed  o Bed alarm/tab alert on  o Bed/Chair-wheels locked  o Bed in low position  o Call light within reach  o RN notified  o Side rails x 3  o Patient positioned to comfort and needs met   · Compliance with Program/Exercises: Compliant all of the time  · Recommendations/Intent for next treatment session: \"Next visit will focus on advancements to more challenging activities and reduction in assistance provided\".     Total Treatment Duration:  PT Patient Time In/Time Out  Time In: 1310  Time Out: [address], DPT

## 2020-04-02 NOTE — PROGRESS NOTES
Progress Note    Patient: Daina Pinzon MRN: [id number]  SSN: [id number]    YOB: 1975  Age: 53 y.o. Sex: male      Admit Date: 12/12/2019    LOS: 112 days     Subjective:     Pt is a 49yo M with hx tobacco abuse, HTN, DM who presented on 12/12/19 with L hemiplegia, dysarthria. Outside of TPA window. Found to have acute embolic stroke with acute to early subacute infarcts in multiple territories on MRI. PFO noted on TTE with R-L shunt. Hospitalization has been prolonged by placement issues, he continues to work with PT and OT.    4/2: Izabel Austin. Continues to work with PT.  BS mildly elevated fasting    Objective:     Vitals:    04/01/20 2000 04/02/20 0000 04/02/20 0400 04/02/20 0800   BP: 126/84 119/81 134/88 127/82   Pulse: 67 71 68 72   Resp: 16 16 16 17   Temp: 98.1 °F (36.7 °C) 97.8 °F (36.6 °C) 98 °F (36.7 °C) 97.7 °F (36.5 °C)   SpO2: 95% 97% 94% 94%   Weight:       Height:            Intake and Output:  Current Shift: No intake/output data recorded. Last three shifts: No intake/output data recorded. Physical Exam:   GENERAL: alert, cooperative, no distress, appears stated age  LUNG: clear to auscultation bilaterally  HEART: regular rate and rhythm, S1, S2 normal, no murmur, click, rub or gallop  ABDOMEN: soft, non-tender. Bowel sounds normal. No masses,  no organomegaly  EXTREMITIES:  extremities normal, atraumatic, no cyanosis or edema  NEUROLOGIC: left hemiplegia    Lab/Data Review: All lab results for the last 24 hours reviewed.        Assessment:     Principal Problem:    Acute embolic stroke ([facility] (10/78/9223)    Active Problems:    Hypertension ()      Type 2 diabetes mellitus ([facility] ()      Arrhythmia (12/15/2019)      Hyperlipemia (12/15/2019)      PFO (patent foramen ovale) (12/17/2019)      Hypomagnesemia (3/7/2020)        Plan:     s/p Acute Embolic CVA  Hospitalization day 112  Placement issues, due to lack of insurance  Stable, with left hemiplegia, will require placement  - MRI showed acute to early subacute infarcts in multiple territories. - PFO on TTE, with right to left shunt  - continue ASA and statin  - continue  PT     Hypertension:   Continue with home meds:   Lisinopril 40 mg PO daily  Hydralazine PRN  Continue metoprolol     T2DM:   Restarted low dose metformin.  Monitor FSBS periodically     HLD  Continue atorvastatin     placement: medicaid pending    Signed By: Jordana Philip MD     April 2, 2020

## 2020-04-02 NOTE — PROGRESS NOTES
SPEECH PATHOLOGY NOTE:    Attempted to see patient for cognitive linguistic treatment this AM; however, patient politely declined stating he was taking a nap and requested therapist return later this date. Will check back at later time/date as schedule permits.       Mckinley Whitmore MS, CCC-SLP

## 2020-04-02 NOTE — PROGRESS NOTES
's follow-up visit. Mr. Juvencio Saavedra appeared to be sleeping and I did not wake him.     Dontrell Humphrey 66  Board Certified

## 2020-04-02 NOTE — PROGRESS NOTES
Pt discussed with DIXIE during conference call. They will let CM know tomorrow when there is follow up with the disability determination. DIXIE stated that they reached out to vocational rehab and they requested medical records from Dec 2019 to current  information. DECO is to send  those to  them to and try and get them to push things through for SSI.

## 2020-04-02 NOTE — PROGRESS NOTES
Neurolinguistic Goals: goals per progress note 3/10/2020  LTG: Patient will increase neuro-linguistic abilities to increase safety and awareness in functional living environment   STG: Patient will solve mathematical problems with 80%accuracy given minimal cueing. Not met 3/10/2020  STG: Patient will name 10 items to  abstract category in 1 minute given  moderate cueing. Progressing 1/31/2020. MET&edited 3/10/2020  STG: Patient will participate in verbal reasoning tasks with 80% accuracy given minimal cueing. Progressing 3/10/2020  STG: Patient will complete mental manipulation tasks with 80% accuracy given minimal cueing. Not met 3/10/2020  STG: Patient will complete working memory/attention tasks with 80% accuracy given minimal cueing. Progressing 3/10/2020  STG: Patient will recall relevant verbal information with 80% accuracy with minimal assistance. Progressing 3/10/2020  STG: Patient will utilize memory compensatory strategies to improve recall of functional list/message with 90%accuracy given minimal assistance. Progressing 3/10/2020     Dysarthria Goals:  LTG: Patient will develop functional and intelligible speech and utilize compensatory strategies to improve communication across environments. STG: Patient will utilize compensatory strategies at conversation level with 90% accuracy independently.  Progressing 3/10/2020      SPEECH LANGUAGE PATHOLOGY: SPEECH-LANGUAGE/COGNITION: Daily Note 6    NAME/AGE/GENDER: Jenine Oppenheim is a 53 y.o. male  DATE: 4/2/2020  PRIMARY DIAGNOSIS: Acute ischemic stroke ([facility] [I63.9]  Cerebrovascular accident (CVA) due to embolism ([facility] [I63.9]      ICD-10: Treatment Diagnosis: R47.1 Dysarthria and Anarthria  R41.841 Cognitive-Communication Deficit    RECOMMENDATIONS   TREATMENT RECOMMENDATIONS:    Continue to follow for cognitive linguistic treatment     STRATEGIES:    Speech strategies: slow rate/over articulate to improve articulatory precision   Memory strategies: repeat and write down novel/relevant information     EDUCATION:  · Recommendations discussed with Patient   Continuation of Skilled Services/Medical Necessity:   Patient is expected to demonstrate progress in expressive communication and cognitive ability to decrease assistance required communication, increase independence with activities of daily living and increase communication with family/caregivers.  Patient continues to require skilled intervention due to cognitive linguistic deficits and ongoing dysarthria      RECOMMENDATIONS for CONTINUED SPEECH THERAPY: YES: Anticipate need for ongoing speech therapy during this hospitalization and at next level of care. ASSESSMENT   Patient exhibited mod-marked difficulty completing functional math word problems due to impaired reasoning. Patient also with poor problem solving and mental flexibility when determining how to solve basic math word problems. Working memory and mental manipulation adequate when verbally presented with 3 items; however, accuracy with tasks significantly reduced when complexity of task is increased. Recommend ongoing speech therapy services targeting speech intelligibility and cognitive linguistic function during this hospitalization and at next level of care. COMPLIANCE WITH PROGRAM/EXERCISES: Compliant most of the time  REHABILITATION POTENTIAL FOR STATED GOALS: Fair  PLAN    FREQUENCY/DURATION: Continue to follow patient 2 times a week for duration of hospital stay to address above goals. - Recommendations for next treatment session: Next treatment will address cognitive linguistic abilities     SUBJECTIVE   Upright in bed. Reports being tired today, but states he feels fine. Affect remains flat. Orientation:   Person  Place  Time  Situation     Pain: Pain Scale 1: Numeric (0 - 10)  Pain Intensity 1: 0    OBJECTIVE   Functional math/solving daily math word problems: 7/10 accurate with moderate verbal cues.  -requires additional time for completion  Working memory/mental manipulation: repeated 3 words sequence accurately on 4/4 trials and sequenced items by attribute with 4/4 accurate; repeated 4 word sequence accurately with 1/4 accurate (required repetition of item missed) and sequenced items accurately on 2/4 trials with mod verbal cues. Tool Used: MODIFIED MARTY SCALE (mRS)   Score   No Symptoms  [] 0   No significant disability despite symptoms; able to carry out all usual duties and activities  [] 1   Slight disability; unable to carry out all previous activities but able to look after own affairs without assistance. [] 2   Moderate disability; requiring some help but able to walk without assistance  [x] 3   Moderately severe disability; unable to walk without assistance and unable to attend to own bodily needs without assistance  [] 4   Severe disability; bedridden, incontinent, and requiring constant nursing care and attention  [] 5      Score:  Initial: 3 Current: 3   Interpretation of Tool: The Modified Marty Scale is a 7-point scaled used to quantify level of disability as it relates to a patient's functional abilities. INTERDISCIPLINARY COLLABORATION: Registered Nurse  PRECAUTIONS/ALLERGIES: Patient has no known allergies.      SAFETY:  After treatment position/precautions:  · Supine in bed  · Call light within reach    Total Treatment Duration:     Time In: 1243  Time Out: Coy Nathalia [address], [address] [facility]

## 2020-04-03 LAB — GLUCOSE BLD STRIP.AUTO-MCNC: 114 MG/DL (ref 65–100)

## 2020-04-03 PROCEDURE — 74011250637 HC RX REV CODE- 250/637: Performed by: INTERNAL MEDICINE

## 2020-04-03 PROCEDURE — 97110 THERAPEUTIC EXERCISES: CPT

## 2020-04-03 PROCEDURE — 82962 GLUCOSE BLOOD TEST: CPT

## 2020-04-03 PROCEDURE — 97530 THERAPEUTIC ACTIVITIES: CPT

## 2020-04-03 PROCEDURE — 74011250636 HC RX REV CODE- 250/636: Performed by: INTERNAL MEDICINE

## 2020-04-03 PROCEDURE — 74011250637 HC RX REV CODE- 250/637: Performed by: HOSPITALIST

## 2020-04-03 PROCEDURE — 97535 SELF CARE MNGMENT TRAINING: CPT

## 2020-04-03 PROCEDURE — 74011250637 HC RX REV CODE- 250/637: Performed by: FAMILY MEDICINE

## 2020-04-03 PROCEDURE — 65270000029 HC RM PRIVATE

## 2020-04-03 RX ADMIN — METFORMIN HYDROCHLORIDE 500 MG: 500 TABLET ORAL at 09:02

## 2020-04-03 RX ADMIN — DIPHENHYDRAMINE HYDROCHLORIDE 25 MG: 25 CAPSULE ORAL at 20:21

## 2020-04-03 RX ADMIN — ASPIRIN 81 MG 81 MG: 81 TABLET ORAL at 09:02

## 2020-04-03 RX ADMIN — ATORVASTATIN CALCIUM 80 MG: 80 TABLET, FILM COATED ORAL at 20:21

## 2020-04-03 RX ADMIN — ACETAMINOPHEN 650 MG: 325 TABLET, FILM COATED ORAL at 05:35

## 2020-04-03 RX ADMIN — ENOXAPARIN SODIUM 40 MG: 40 INJECTION SUBCUTANEOUS at 15:24

## 2020-04-03 RX ADMIN — GUAIFENESIN 100 MG: 100 SOLUTION ORAL at 20:20

## 2020-04-03 RX ADMIN — Medication 400 MG: at 09:02

## 2020-04-03 RX ADMIN — LISINOPRIL 40 MG: 20 TABLET ORAL at 09:02

## 2020-04-03 RX ADMIN — METOPROLOL SUCCINATE 25 MG: 25 TABLET, FILM COATED, EXTENDED RELEASE ORAL at 09:02

## 2020-04-03 RX ADMIN — ACETAMINOPHEN 650 MG: 325 TABLET, FILM COATED ORAL at 14:00

## 2020-04-03 RX ADMIN — ACETAMINOPHEN 650 MG: 325 TABLET, FILM COATED ORAL at 20:21

## 2020-04-03 NOTE — PROGRESS NOTES
Problem: Self Care Deficits Care Plan (Adult)  Goal: *Acute Goals and Plan of Care (Insert Text)  Description    Goals per Re-evaluation on 3/18/2020:   1. Patient will demonstrate appropriate safety awareness and protection of L UE during bed mobility and functional transfers with minimal cues. (Progressing, still needs cues, 3/18/2020)  2. Patient will complete total body bathing and dressing with Min A and adaptive equipment as needed. (Progressing, UB bathing Min A, UB dressing at [address] [place] [address] , LB dressing at [facility], 3/18/2020)  3. Patient will complete weightbearing into the L UE with ADL tasks with minimal assistance to improve ability to use as a functional assist during ADL tasks. (Progressing, 3/18/2020)4. Patient will demonstrate L UE SROM HEP within 7 days. (Progressing, 3/18/2020)  5. Pt will complete bed mobility with Mod I and minimal verbal cueing (Progressing, Supervision, 3/25/2020). 6. Pt will complete dynamic sitting balance for ADLs at mod I with good balance (Progressing, 3/18/2020)  7. Pt will complete functional transfers with Supervision with equipment as needed. (Progressing, SBA 3/18/2020)  8. Pt will complete grooming tasks in standing at sink level x5 mins with good balance and equipment as needed (Progressing, CGA with fair balance 3/25/2020)  9. Pt will complete grooming standing at sink level with SBA and use of adaptive equipment as needed. 10. Pt will don/doff UE sling with SBA and use of minimal verbal and visual cueing for correct application (Progressing, Min A 3/25/2020). Goals per Re-evaluation on 3/27/2020:   1. Patient will demonstrate appropriate safety awareness and protection of L UE during bed mobility and functional transfers with no verbal cues. 2. Patient will complete lower body bathing and dressing with Min A and adaptive equipment as needed. 3. Patient will complete upper body bathing and dressing with SBA and adaptive equipment as needed.   4. Patient will complete weightbearing into the L UE with ADL tasks with minimal assistance to improve ability to use as a functional assist during ADL tasks. 5. Patient will demonstrate L UE SROM HEP within 7 days. 6. Pt will complete bed mobility with Mod I and no verbal cueing. 7. Pt will complete functional transfers with Supervision with equipment as needed. 8. Pt will don/doff UE sling with Mod I and no verbal cueing. 9. Pt will complete toileting with SBA for toilet transfer and gown management. Outcome: Progressing Towards Goal     OCCUPATIONAL THERAPY: Daily Note and AM    4/3/2020  INPATIENT: OT Visit Days: 3  Payor: MEDICAID PENDING / Plan: [employer] PENDING / Product Type: Medicaid /      NAME/AGE/GENDER: Alanna Boss is a 53 y.o. male   PRIMARY DIAGNOSIS:  Acute ischemic stroke ([facility] [I63.9]  Cerebrovascular accident (CVA) due to embolism ([address] [W07.7] Acute embolic stroke ([facility] [address] Acute embolic stroke ([address]       ICD-10: Treatment Diagnosis:    · Generalized Muscle Weakness (M62.81)  · Other lack of cordination (R27.8)  · Hemiplegia and hemiparesis following cerebral infarction affecting   · left non-dominant side (I69.354)  · Abnormal posture (R29.3)   Precautions/Allergies:    NO PULLING ON LUE   LUE in sling with shoulder joint approximated and supported, needs taping   Patient has no known allergies. ASSESSMENT:     Mr. Kyung Cisse presents to the hospital with L sided weakness and acute ischemic CVA. MRI revealed acute to subacute L cerebellar and R paramedian yariel infarcts. 4/3/2020: Upon arrival, pt seated upright on BSC just finishing bowel movement. Pt completes sit to stand with SBA and use of luke-walker. Pt performs bowel/bladder hygiene with Set Up to wipe and performs wiping in standing with SBA. Pt requires Mod A to don diaper brief with pt able to pull brief above knee with R UE while OT pulls brief above knee on L side.  Pt is SBA to pull brief to begin but requires increased CGA assist to complete due to decreased balance. Pt [facility] from [facility] to [facility] to perform grooming with CGA and use of keanu-walker. Pt with improved dynamic standing balance with decreased dragging of L foot. Pt requires SBA to wash hands standing at sink to begin, but requires increased CGA due to decreased dynamic standing balance. Pt requires CGA to return from sink to seated edge of bed. Pt completes BUE exercises listed in treatment session of note below. Pt with increased L hand digit movement today and able to independently adduct fingers in L hand. Pt still reporting pain in PIP joint of 2nd and 4th digits of L hand. Pt requires SBA to complete tranfer from bed to w/c to proceed with self-feeding as lunch tray just arrives. Pt requires Min A to self-feed with assistance needed to remove bottle cap from protein shake. Pt required Min A to don L UE sling during today's session. Pt left seated upright in w/c with CNA in room to take pt vitals. All needs met and within reach at this time. RN notified. Will continue POC. This section established at most recent assessment   PROBLEM LIST (Impairments causing functional limitations):  1. Decreased Strength  2. Decreased ADL/Functional Activities  3. Decreased Transfer Abilities  4. Decreased Ambulation Ability/Technique  5. Decreased Balance  6. Decreased Activity Tolerance  7. Increased Fatigue  8. Decreased Flexibility/Joint Mobility  9. Decreased Knowledge of Precautions  10. Decreased Walsh with Home Exercise Program   INTERVENTIONS PLANNED: (Benefits and precautions of occupational therapy have been discussed with the patient.)  1. Activities of daily living training  2. Adaptive equipment training  3. Balance training  4. Clothing management  5. Cognitive training  6. Donning&doffing training  7. Keanu tech training  8. Neuromuscular re-eduation  9. Therapeutic activity  10.  Therapeutic exercise     TREATMENT PLAN: Frequency/Duration: Follow patient 3 times per week to address above goals. Rehabilitation Potential For Stated Goals: Excellent     REHAB RECOMMENDATIONS (at time of discharge pending progress):    Placement: It is my opinion, based on this patient's performance to date, that Mr. Kyung Cisse may benefit from intensive therapy at an [address] [place] [address] after discharge due to potential to make ongoing and sustainable functional improvement that is of practical value. Vira Frizzle Pt functioning far below independent baseline, demonstrating good improvement and participation. Pt would likely benefit greatly and increase independence from inpatient rehab stay. Equipment:    TBD               OCCUPATIONAL PROFILE AND HISTORY:   History of Present Injury/Illness (Reason for Referral):  See H&P  Past Medical History/Comorbidities:   Mr. Kyung Cisse  has a past medical history of Acute ischemic stroke ([address] (12/12/2019), Acute pancreatitis (11/19/2014), Cerebrovascular accident (CVA) due to embolism ([address] (12/12/2019), Diabetes ([address] (2002), Diabetes ([address] Diabetes mellitus, and Hypertension. He also has no past medical history of Arthritis, Asthma, Autoimmune disease ([address] CAD (coronary artery disease), Cancer ([address] Chronic kidney disease, COPD, Dementia, Dementia ([address] Heart failure ([address] Ill-defined condition, Infectious disease, Liver disease, Other ill-defined conditions(799.89), Psychiatric disorder, PUD (peptic ulcer disease), Seizures ([address] or Sleep disorder. Mr. Kyung Cisse  has a past surgical history that includes hx hernia repair and hx orthopaedic.   Social History/Living Environment:   Home Environment: Apartment  # Steps to Enter: 12  Rails to Enter: Yes  [employer] : Bilateral  One/Two Story Residence: One story  Living Alone: No  Support Systems: Spouse/Significant Other/Partner  Patient Expects to be Discharged to[de-identified] Unknown  Current DME Used/Available at Home: None  Tub or Shower Type: Tub/Shower combination  Prior Level of Function/Work/Activity:  Pt lives at home with his wife. Pt is typically independent with ADL/functional mobility. Pt does not drive. Pt was working part-time at [employer]. Personal Factors:          Age:  53 y.o. Past/Current Experience:  CVA with flaccid L side        Other factors that influence how disability is experienced by the patient:  multiple co-morbidities    Number of Personal Factors/Comorbidities that affect the Plan of Care: Extensive review of physical, cognitive, and psychosocial performance (3+):  HIGH COMPLEXITY   ASSESSMENT OF OCCUPATIONAL PERFORMANCE[de-identified]   Activities of Daily Living:   Basic ADLs (From Assessment) Complex ADLs (From Assessment)   Feeding: Setup  Oral Facial Hygiene/Grooming: Minimum assistance  Bathing: Moderate assistance  Upper Body Dressing: Minimum assistance  Lower Body Dressing: Maximum assistance  Toileting: Moderate assistance Instrumental ADL  Meal Preparation: Total assistance  Homemaking: Total assistance  Medication Management: Total assistance  Financial Management: Total assistance   Grooming/Bathing/Dressing Activities of Daily Living   Grooming  Grooming Assistance: Stand-by assistance  Position Performed: Standing  Washing Hands: Stand-by assistance     Upper Body Bathing  Bathing Assistance: Min A   Position Performed: Seated in chair  Feeding  Feeding Assistance: Minimum assistance  Container Management: Maximum assistance(Open bottle cap of protein shake)     Toileting  Toileting Assistance: Stand-by assistance;Contact guard assistance  Bladder Hygiene: Stand-by assistance;Contact guard assistance  Bowel Hygiene: Stand-by assistance;Contact guard assistance  Adaptive Equipment: (Keanu walker)   Upper Body Dressing Assistance  Dressing Assistance: Minimum assistance(Don UE sling) Functional Transfers  Bathroom Mobility: Contact guard assistance  Toilet Transfer : Stand-by assistance   Lower Body Dressing Assistance  Socks:  Total assistance (dependent) Bed/Mat Mobility  Sit to Stand: Stand-by assistance  Stand to Sit: Stand-by assistance  Bed to Chair: Contact guard assistance  Scooting: Stand-by assistance     Most Recent Physical Functioning:   Gross Assessment:                  Posture:     Balance:  Sitting: Intact  Sitting - Static: Good (unsupported)  Sitting - Dynamic: Good (unsupported)  Standing: Impaired  Standing - Static: Good  Standing - Dynamic : Fair Bed Mobility:  Scooting: Stand-by assistance  Wheelchair Mobility:     Transfers:  Sit to Stand: Stand-by assistance  Stand to Sit: Stand-by assistance  Bed to Chair: Contact guard assistance            Patient Vitals for the past 6 hrs:   BP BP Patient Position SpO2 Pulse   20 0800 117/78 At rest 93 % 64       Mental Status  Neurologic State: Alert  Orientation Level: Oriented X4  Cognition: Follows commands  Perception: Cues to attend to left side of body, Cues to maintain midline in sitting, Cues to maintain midline in standing, Tactile, Verbal, Visual  Perseveration: No perseveration noted  Safety/Judgement: Fall prevention                          Physical Skills Involved:  1. Range of Motion  2. Balance  3. Strength  4. Activity Tolerance  5. Fine Motor Control  6. Gross Motor Control Cognitive Skills Affected (resulting in the inability to perform in a timely and safe manner):  1. Perception  2. Expression Psychosocial Skills Affected:  1. Habits/Routines  2. Environmental Adaptation  3. Social Interaction  4. Emotional Regulation  5. Self-Awareness  6. Awareness of Others  7. Social Roles   Number of elements that affect the Plan of Care: 5+:  HIGH COMPLEXITY   CLINICAL DECISION MAKIN [facility] [address] [id number] AM-PAC 6 Clicks   Daily Activity Inpatient Short Form  How much help from another person does the patient currently need. .. Total A Lot A Little None   1. Putting on and taking off regular lower body clothing? [] 1   [x] 2   [] 3   [] 4   2. Bathing (including washing, rinsing, drying)?    [] 1   [] 2 [x] 3   [] 4   3. Toileting, which includes using toilet, bedpan or urinal?   [] 1   [] 2   [x] 3   [] 4   4. Putting on and taking off regular upper body clothing? [] 1   [] 2   [x] 3   [] 4   5. Taking care of personal grooming such as brushing teeth? [] 1   [] 2   [x] 3   [] 4   6. Eating meals? [] 1   [] 2   [x] 3   [] 4   © 2007, Trustees of [address] [place] [address] [address] [address], under license to [employer]. All rights reserved      Score:  Initial: 11 Most Recent: 15 (Date: 3/18/2020 ); 17 (3/27/2020)    Interpretation of Tool:  Represents activities that are increasingly more difficult (i.e. Bed mobility, Transfers, Gait). Medical Necessity:     · Patient demonstrates   · good and excellent  ·  rehab potential due to higher previous functional level. Reason for Services/Other Comments:  · Patient continues to require skilled intervention due to   · Decreased independence with ADL/functional transfers that impacts overall quality of life. · .   Use of outcome tool(s) and clinical judgement create a POC that gives a: MODERATE COMPLEXITY         TREATMENT:   (In addition to Assessment/Re-Assessment sessions the following treatments were rendered)     Pre-treatment Symptoms/Complaints:  \"I'll do that. \" Pt reply in regards to sitting in w/c to eat lunch. Pain: Initial:   Pain Intensity 1: 0  Pain Location 1: Arm  Pain Orientation 1: Left  Post Session: Unchanged     Therapeutic Exercise: (20 minutes):  Exercises per grid below to improve mobility and strength. Required moderate visual, verbal, manual and tactile cues to promote proper body alignment and promote proper body mechanics. Progressed resistance and repetitions as indicated.      Date:  3/31/2020 Date:  4/3/2020 Date:     Activity/Exercise Parameters Parameters Parameters   BUE Shoulder Flexion  20 reps 20 reps    BUE Forward Reaching 20 reps 20 reps    BUE Elbow Flexion/Extension 20 reps 20 reps    BUE Pronation/Supination 10 reps each way 10 reps each way    BUE Wrist Flexion/Extension 10 reps each way 10 reps each way    L PROM Internal/External Rotation  10 reps each way 20 reps each way    L PROM Digit Abduction 10 reps each way 10 reps    L AROM Digit Adduction   10 reps      Self Care: (15 minutes): Procedure(s) utilized to improve and/or restore self-care/home management as related to dressing, toileting and self feeding. Required moderate visual, verbal and manual cueing to facilitate activities of daily living skills and compensatory activities. Pt seated upright on BSC just finishing bowel movement. Pt completes sit to stand with SBA. Pt performs bowel/bladder hygiene with Set Up to wipe and performs wiping in standing with SBA. Pt requires Mod A to don diaper brief with pt able to pull brief above knee with R UE while OT pulls brief above knee on L side. Pt is SBA to pull brief to begin but requires CGA assist to complete due to decreased balance. Pt walks from [facility] to [facility] to perform grooming with CGA and use of luke-walker. Pt with improved dynamic standing balance with decreased dragging of L foot. Pt requires SBA to wash hands standing at sink but requires increased CGA due to decreased balance. Pt requires CGA to return from sink to seated edge of bed. Pt requires Min A to self-feed with assistance needed to remove bottle cap from protein shake. Pt required Min A to don L UE sling during today's session. Braces/Orthotics/Lines/Etc:   · O2 device: Room air  · LUE sling  · Treatment/Session Assessment:    · Response to Treatment:  Pt with improved out of bed mobility and standing balance during performance of ADLs. · Interdisciplinary Collaboration:   o Physical Therapy Assistant  o Occupational Therapist  o Registered Nurse  · After treatment position/precautions:   o Bed/Chair-wheels locked  o Bed in low position  o Call light within reach  o RN notified  o Pt seated upright in W/C with footrests in place and brakes locked.    · Compliance with Program/Exercises: Compliant all of the time, Will assess as treatment progresses. · Recommendations/Intent for next treatment session: \"Next visit will focus on advancements to more challenging activities and reduction in assistance provided\".   Total Treatment Duration:   OT Patient Time In/Time Out  Time In: 1115  Time Out: 1150     Mary Fairchild

## 2020-04-03 NOTE — PROGRESS NOTES
Problem: Mobility Impaired (Adult and Pediatric)  Goal: *Acute Goals and Plan of Care  Description  GOALS UPDATED ON RE-ASSESSMENT 4/1/2020 (advancements to goals in bold):  1. Patient will perform bed mobility with supervision and 0 verbal cues within 7 treatment days. 2. Patient will perform transfer bed to chair with SUPERVISION within 7 treatment days. 3. Patient will demonstrate fair+ dynamic balance throughout stance phase on L LE within 7 treatment days. 4. Patient will require STAND BY ASSIST throughout swing phase on (to advance) L LE within 7 treatment days. 5. Patient demonstrate 3+/5 strength in L LE hip flexion, hip abduction, and hip adduction within 7 treatment days. 6. Patient will ambulate 200ft+ with STAND BY ASSIST and least retrictive assistive device within 7 treatment days. 7. Patient will perform wheelchair mobility x75ft with MODIFIED INDEPENDENCE within 7 treatment days. 8. Mr. Papito Ellison will be independent with wheelchair locking/unlocking mechanism as well as leg rest manipulation within 7 days to improve safety and independence. 9. Mr. Papito Ellison will don/doff LUE sling for protection of L shoulder during transfers/gait within 7 treatment days. PHYSICAL THERAPY: Daily Note and PM 4/3/2020  INPATIENT: PT Visit Days : 3  Payor: MEDICAID PENDING / Plan: William Garcia PENDING / Product Type: Medicaid /       NAME/AGE/GENDER: Gifty Johnson is a 53 y.o. male   PRIMARY DIAGNOSIS: Acute ischemic stroke ([facility] [address] [I63.9]  Cerebrovascular accident (CVA) due to embolism ([address] [Y43.8] Acute embolic stroke ([address] Acute embolic stroke ([facility] [address]       ICD-10: Treatment Diagnosis:   · Difficulty in walking, Not elsewhere classified (R26.2)  · Hemiplegia and hemiparesis following cerebral infarction affecting left non-dominant side (E33.886)   Precaution/Allergies:  Patient has no known allergies. ASSESSMENT:     Mr. Papito Ellison is a 53year old admitted R MCA CVA.   Patient was sitting on [facility] upon contact and agreeable to PT. Patient transfers to standing with CGA-SBA. Patient then ambulates 200' then 125' with luke walker and focus on positioning of L foot during strike (heel first) and stance phase on L to improve external rotation. Patient needed cues periodically throughout gait to improve this and have proper gait sequencing. Wheelchair in tow for safety. Once seated rest break in between ambulation bouts. Gait mechanics decline as patient fatigues requiring increased cues. Patient returns to his room where he was left sitting up in wheelchair. Overall good progress towards physical therapy goals. Goals listed above are still appropriate. Will continue efforts as patient is still below functional baseline. This section established at most recent assessment   PROBLEM LIST (Impairments causing functional limitations):  1. Decreased Strength  2. Decreased ADL/Functional Activities  3. Decreased Transfer Abilities  4. Decreased Ambulation Ability/Technique  5. Decreased Balance  6. Increased Pain  7. Decreased Activity Tolerance  8. Increased Fatigue  9. Decreased Flexibility/Joint Mobility   INTERVENTIONS PLANNED: (Benefits and precautions of physical therapy have been discussed with the patient.)  1. Balance Exercise  2. Bed Mobility  3. Family Education  4. Gait Training  5. Home Exercise Program (HEP)  6. Manual Therapy  7. Neuromuscular Re-education/Strengthening  8. Range of Motion (ROM)  9. Therapeutic Activites  10. Therapeutic Exercise/Strengthening  11. Transfer Training     TREATMENT PLAN: Frequency/Duration: 3 times a week for duration of hospital stay  Rehabilitation Potential For Stated Goals: Good     REHAB RECOMMENDATIONS (at time of discharge pending progress):    Placement:   It is my opinion, based on this patient's performance to date, that Mr. Fernando Bell may benefit from intensive therapy at an [address] [place] [address] after discharge due to a probable need for close medical supervision by a rehab physician, a probable need for multiple therapy disciplines and potential to make ongoing and sustainable functional improvement that is of practical value. .  Equipment:    TBD pending progress with therapy. HISTORY:   History of Present Injury/Illness (Reason for Referral):  Per H&P: \"Pt is a 52 y/o smoker with DM, HTN, who presented to ER with L leg and arm weakness, L facial droop, dysarthria. First noted L leg weakness late night 12/11 when he woke up to go to the bathroom. He was normal when he went to bed around 1030. Woke up this morning and had persistent weakness L leg and also now noted in L arm. EMS called. Noted to have slurred speech and L facial droop as well. Code S called in ER around 9am.  MRI with acute infarcts in L cerebellar hemisphere, deep frontoparietal white matter, and R paramedian yariel. Also noted old lacunar infarcts. No large vessel occlusion on CTA, but some stenosis noted. No hx afib, TIA, CVA. No CP, palpitations, SOB. \"  Past Medical History/Comorbidities:   Mr. Krys Grimaldo  has a past medical history of Acute ischemic stroke ([address] (12/12/2019), Acute pancreatitis (11/19/2014), Cerebrovascular accident (CVA) due to embolism ([address] (12/12/2019), Diabetes ([address] (2002), Diabetes ([address] Diabetes mellitus, and Hypertension. He also has no past medical history of Arthritis, Asthma, Autoimmune disease ([address] CAD (coronary artery disease), Cancer ([address] Chronic kidney disease, COPD, Dementia, Dementia ([address] Heart failure ([address] Ill-defined condition, Infectious disease, Liver disease, Other ill-defined conditions(799.89), Psychiatric disorder, PUD (peptic ulcer disease), Seizures ([address] or Sleep disorder. Mr. Krys Grimaldo  has a past surgical history that includes hx hernia repair and hx orthopaedic.   Social History/Living Environment:   Home Environment: Apartment  # Steps to Enter: 12  Rails to Enter: Yes  [employer] : Bilateral  One/Two Story Residence: One story  Living Alone: No  Support Systems: Spouse/Significant Other/Partner  Patient Expects to be Discharged to[de-identified] Unknown  Current DME Used/Available at Home: None  Tub or Shower Type: Tub/Shower combination  Prior Level of Function/Work/Activity:  Independent, lives with wife in 2nd story 1 level apartment. No recent falls. Number of Personal Factors/Comorbidities that affect the Plan of Care: 1-2: MODERATE COMPLEXITY   EXAMINATION:   Most Recent Physical Functioning:   Gross Assessment:                  Posture:     Balance:  Sitting: Intact  Standing: Impaired  Standing - Static: Good  Standing - Dynamic : Fair Bed Mobility:     Wheelchair Mobility:     Transfers:  Sit to Stand: Stand-by assistance  Stand to Sit: Stand-by assistance  Gait:     Base of Support: Narrowed; Center of gravity altered  Speed/Clotilde: Slow  Step Length: Left shortened;Right shortened  Gait Abnormalities: Decreased step clearance; Step to gait;Trunk sway increased  Distance (ft): (200' then 125')  Assistive Device: Walker luke  Ambulation - Level of Assistance: Contact guard assistance;Stand-by assistance  Interventions: Safety awareness training; Tactile cues; Verbal cues      Body Structures Involved:  1. Nerves  2. Voice/Speech  3. Bones  4. Joints  5. Muscles Body Functions Affected:  1. Mental  2. Sensory/Pain  3. Neuromusculoskeletal  4. Movement Related Activities and Participation Affected:  1. General Tasks and Demands  2. Mobility  3. Self Care  4. Interpersonal Interactions and Relationships   Number of elements that affect the Plan of Care: 4+: HIGH COMPLEXITY   CLINICAL PRESENTATION:   Presentation: Evolving clinical presentation with changing clinical characteristics: MODERATE COMPLEXITY   CLINICAL DECISION MAKIN [facility] Inpatient Short Form  How much difficulty does the patient currently have. .. Unable A Lot A Little None   1.   Turning over in bed (including adjusting bedclothes, sheets and blankets)? [] 1   [] 2   [] 3   [x] 4   2. Sitting down on and standing up from a chair with arms ( e.g., wheelchair, bedside commode, etc.)   [] 1   [] 2   [x] 3   [] 4   3. Moving from lying on back to sitting on the side of the bed? [] 1   [] 2   [x] 3   [] 4   How much help from another person does the patient currently need. .. Total A Lot A Little None   4. Moving to and from a bed to a chair (including a wheelchair)? [] 1   [] 2   [x] 3   [] 4   5. Need to walk in hospital room? [] 1   [] 2   [x] 3   [] 4   6. Climbing 3-5 steps with a railing? [] 1   [x] 2   [] 3   [] 4   © 2007, Trustees of [address] [place] [address], under license to [employer]. All rights reserved      Score:  Initial: 13 Most Recent: 18 (Date:3/9/2020)    Interpretation of Tool:  Represents activities that are increasingly more difficult (i.e. Bed mobility, Transfers, Gait). Medical Necessity:     · Patient is expected to demonstrate progress in   · strength, range of motion, balance, coordination, and functional technique  ·  to   · increase independence with all mobility. · .  Reason for Services/Other Comments:  · Patient continues to require skilled intervention due to   · medical complications and mobility deficits which impact his level of function, safety, and independence as indicated above. · .   Use of outcome tool(s) and clinical judgement create a POC that gives a: Questionable prediction of patient's progress: MODERATE COMPLEXITY        TREATMENT:      Pre-treatment Symptoms/Complaints:  none  Pain: Initial: 0/10 Post Session:  0/10     Therapeutic Activity: (    30 Minutes): Therapeutic activities including transfer training, ambulation on level ground, instruction in sequencing with luke walker, scootiing, donning sling to LUE, stance and swing phase of LLE, wheelchair mechanics, and patient education  to improve mobility, strength and balance.   Required moderate Safety awareness training; Tactile cues; Verbal cues to promote static and dynamic balance in standing and promote coordination of bilateral, lower extremity(s). Braces/Orthotics/Lines/Etc:   · Sling to LUE  · Ace wrap applied to L ankle to assist in dorsiflexion/inversion  Treatment/Session Assessment:    · Response to Treatment:  See above  · Interdisciplinary Collaboration:   o Physical Therapy Assistant  o Registered Nurse  · After treatment position/precautions:   o Up in chair  o Bed/Chair-wheels locked  o Call light within reach  o RN notified   · Compliance with Program/Exercises: Compliant all of the time  · Recommendations/Intent for next treatment session: \"Next visit will focus on advancements to more challenging activities and reduction in assistance provided\".     Total Treatment Duration:  PT Patient Time In/Time Out  Time In: 1230  Time Out: Serina 876, PTA

## 2020-04-03 NOTE — PROGRESS NOTES
Progress Note    Patient: Hazel Waldron MRN: [id number]  SSN: [id number]    YOB: 1975  Age: 53 y.o. Sex: male      Admit Date: 12/12/2019    LOS: 113 days     Subjective:     Pt is a 51yo M with hx tobacco abuse, HTN, DM who presented on 12/12/19 with L hemiplegia, dysarthria. Outside of TPA window. Found to have acute embolic stroke with acute to early subacute infarcts in multiple territories on MRI. PFO noted on TTE with R-L shunt. Hospitalization has been prolonged by placement issues, he continues to work with PT and OT.    4/3: Jean-Paul Dimas. Restarted metformin. FSBS 114 this morning. Resting in bed. Objective:     Vitals:    04/02/20 2000 04/03/20 0000 04/03/20 0400 04/03/20 0800   BP: 124/64 115/80 132/86 117/78   Pulse: 70 67 68 64   Resp: 17 17 17 17   Temp: 97.9 °F (36.6 °C) 97.7 °F (36.5 °C) 97.7 °F (36.5 °C) 98 °F (36.7 °C)   SpO2: 94% 95% 95% 93%   Weight:       Height:            Intake and Output:  Current Shift: No intake/output data recorded. Last three shifts: No intake/output data recorded. Physical Exam:   GENERAL: alert, cooperative, no distress, appears stated age  LUNG: clear to auscultation bilaterally  HEART: regular rate and rhythm, S1, S2 normal, no murmur, click, rub or gallop  ABDOMEN: soft, non-tender. Bowel sounds normal. No masses,  no organomegaly  EXTREMITIES:  extremities normal, atraumatic, no cyanosis or edema  NEUROLOGIC: left hemiplegia    Lab/Data Review: All lab results for the last 24 hours reviewed.        Assessment:     Principal Problem:    Acute embolic stroke ([facility] [address] (18/36/2710)    Active Problems:    Hypertension ()      Type 2 diabetes mellitus ([facility] [address] ()      Arrhythmia (12/15/2019)      Hyperlipemia (12/15/2019)      PFO (patent foramen ovale) (12/17/2019)      Hypomagnesemia (3/7/2020)        Plan:     s/p Acute Embolic CVA  Hospitalization day 113  Placement issues, due to lack of insurance  Stable, with left hemiplegia, will require placement  - MRI showed acute to early subacute infarcts in multiple territories. - PFO on TTE, with right to left shunt  - continue ASA and statin  - continue  PT     Hypertension:   Continue with home meds:   Lisinopril 40 mg PO daily  Hydralazine PRN  Continue metoprolol     T2DM:   Restarted low dose metformin.  Monitor FSBS periodically     HLD  Continue atorvastatin     placement: medicaid pending    Signed By: Richard Collins MD     April 3, 2020

## 2020-04-03 NOTE — PROGRESS NOTES
04/03/20 1929   NIH Stroke Scale   Interval Other (comment)   LOC 0   LOC Questions 0   LOC Commands 0   Best Gaze 0   Visual 0   Facial Palsy 1   Motor Right Arm 0   Motor Left Arm 2   Motor Right Leg 0   Motor Left Leg 2   Limb Ataxia 0   Sensory 0   Best Language 1   Dysarthria 1   Extinction and Inattention 0   Total 7

## 2020-04-04 LAB — GLUCOSE BLD STRIP.AUTO-MCNC: 105 MG/DL (ref 65–100)

## 2020-04-04 PROCEDURE — 74011250637 HC RX REV CODE- 250/637: Performed by: HOSPITALIST

## 2020-04-04 PROCEDURE — 74011250637 HC RX REV CODE- 250/637: Performed by: INTERNAL MEDICINE

## 2020-04-04 PROCEDURE — 74011250637 HC RX REV CODE- 250/637: Performed by: FAMILY MEDICINE

## 2020-04-04 PROCEDURE — 65270000029 HC RM PRIVATE

## 2020-04-04 PROCEDURE — 74011250636 HC RX REV CODE- 250/636: Performed by: INTERNAL MEDICINE

## 2020-04-04 PROCEDURE — 82962 GLUCOSE BLOOD TEST: CPT

## 2020-04-04 RX ADMIN — ATORVASTATIN CALCIUM 80 MG: 80 TABLET, FILM COATED ORAL at 21:43

## 2020-04-04 RX ADMIN — ENOXAPARIN SODIUM 40 MG: 40 INJECTION SUBCUTANEOUS at 13:54

## 2020-04-04 RX ADMIN — LISINOPRIL 40 MG: 20 TABLET ORAL at 09:03

## 2020-04-04 RX ADMIN — METFORMIN HYDROCHLORIDE 500 MG: 500 TABLET ORAL at 09:02

## 2020-04-04 RX ADMIN — ACETAMINOPHEN 650 MG: 325 TABLET, FILM COATED ORAL at 17:38

## 2020-04-04 RX ADMIN — DIPHENHYDRAMINE HYDROCHLORIDE 25 MG: 25 CAPSULE ORAL at 04:39

## 2020-04-04 RX ADMIN — ACETAMINOPHEN 650 MG: 325 TABLET, FILM COATED ORAL at 04:39

## 2020-04-04 RX ADMIN — ACETAMINOPHEN 650 MG: 325 TABLET, FILM COATED ORAL at 13:54

## 2020-04-04 RX ADMIN — GUAIFENESIN 100 MG: 100 SOLUTION ORAL at 04:39

## 2020-04-04 RX ADMIN — METOPROLOL SUCCINATE 25 MG: 25 TABLET, FILM COATED, EXTENDED RELEASE ORAL at 09:03

## 2020-04-04 RX ADMIN — ACETAMINOPHEN 650 MG: 325 TABLET, FILM COATED ORAL at 21:43

## 2020-04-04 RX ADMIN — ASPIRIN 81 MG 81 MG: 81 TABLET ORAL at 09:03

## 2020-04-04 RX ADMIN — GUAIFENESIN 100 MG: 100 SOLUTION ORAL at 17:38

## 2020-04-04 RX ADMIN — DIPHENHYDRAMINE HYDROCHLORIDE 25 MG: 25 CAPSULE ORAL at 17:38

## 2020-04-04 RX ADMIN — Medication 400 MG: at 09:03

## 2020-04-04 NOTE — PROGRESS NOTES
04/04/20 0730   NIH Stroke Scale   Interval Other (comment)  (Dual NIH with Al, RN)   LOC 0   LOC Questions 0   LOC Commands 0   Best Gaze 0   Visual 0   Facial Palsy 1   Motor Right Arm 0   Motor Left Arm 2   Motor Right Leg 0   Motor Left Leg 2   Limb Ataxia 0   Sensory 0   Best Language 1   Dysarthria 1   Extinction and Inattention 0   Total 7

## 2020-04-04 NOTE — PROGRESS NOTES
Hospitalist Progress Note     Admit Date:  2019  9:07 AM   Name:  Daina Pinzon   Age:  53 y.o.  :  1975   MRN:  [id number]   PCP:  Paula Jiménez MD  Treatment Team: Attending Provider: Rosangela Pereira MD; Care Manager: Callie [facility]; Utilization Review: Josh Koenig RN; Nurse Practitioner: Alpesh Beltran NP; Care Manager: Robi Conteh RN; Physical Therapy Assistant: Abdoulaye Daniel PTA    Subjective:   HPI and or CC:  54 yo AA male with PMH of DM, HTN admitted  for CVA affecting numerous areas of the brain. He was placed on ASA and statin. He has remained alert and oriented x3. Some movement to right side but unable to grasp with right hand. He is currently waiting on placement and this has remained difficult due to waiting on Medicaid. :  He remains afebrile, no leukocytosis. Alert and oriented x3. Voices no complaints today. [place]. Objective:     Patient Vitals for the past 24 hrs:   Temp Pulse Resp BP SpO2   20 1200 98.2 °F (36.8 °C) 76 17 126/81 95 %   20 0800 97.5 °F (36.4 °C) 78 17 124/73 97 %   20 0400 97.5 °F (36.4 °C) 72 17 130/89 99 %   20 0000 98.4 °F (36.9 °C) 62 17 120/79 98 %   20 2000 98.1 °F (36.7 °C) 66 17 127/79 98 %   20 1600 97.8 °F (36.6 °C) 71 17 119/81 96 %     Oxygen Therapy  O2 Sat (%): 95 % (20 1200)  Pulse via Oximetry: 75 beats per minute (20 0400)  O2 Device: Room air (20 0400)  No intake or output data in the 24 hours ending 20 1255      REVIEW OF SYSTEMS: Comprehensive ROS performed and negative except as stated in HPI. Physical Examination:  General:       No acute distress    Lungs:          CTA bilaterally. Resp even and nonlabored  Heart:            S1S2 present without murmurs rubs gallops. RRR. No LE edema  Abdomen:    Soft, non tender, non distended. BS present  Extremities: No cyanosis.  Left sided hemiparesis  Neurologic:  moves right hand and raises arm at elbow moderately, unable to squeeze my fingers left hand, mildly slurred speech.  PERRLA, strength 4/5 RUE/RLE. Data Review:  I have reviewed all labs, meds, telemetry events, and studies from the last 24 hours.     Recent Results (from the past 24 hour(s))   GLUCOSE, POC    Collection Time: 04/04/20  6:00 AM   Result Value Ref Range    Glucose (POC) 105 (H) 65 - 100 mg/dL        All Micro Results     None          Current Meds:  Current Facility-Administered Medications   Medication Dose Route Frequency    metFORMIN (GLUCOPHAGE) tablet 500 mg  500 mg Oral DAILY WITH BREAKFAST    magnesium oxide (MAG-OX) tablet 400 mg  400 mg Oral DAILY    acetaminophen (TYLENOL) tablet 650 mg  650 mg Oral Q4H PRN    diphenhydrAMINE (BENADRYL) capsule 25 mg  25 mg Oral Q6H PRN    acetaminophen (TYLENOL) tablet 650 mg  650 mg Oral Q8H    lip protectant (BLISTEX) ointment 1 Each  1 Each Topical PRN    metoprolol succinate (TOPROL-XL) XL tablet 25 mg  25 mg Oral DAILY    polyethylene glycol (MIRALAX) packet 17 g  17 g Oral DAILY PRN    guaiFENesin (ROBITUSSIN) 100 mg/5 mL oral liquid 100 mg  100 mg Oral Q4H PRN    hydrALAZINE (APRESOLINE) 20 mg/mL injection 20 mg  20 mg IntraVENous Q4H PRN    atorvastatin (LIPITOR) tablet 80 mg  80 mg Oral QHS    lisinopril (PRINIVIL, ZESTRIL) tablet 40 mg  40 mg Oral DAILY    sodium chloride (NS) flush 5-40 mL  5-40 mL IntraVENous PRN    ondansetron (ZOFRAN) injection 4 mg  4 mg IntraVENous Q4H PRN    aspirin chewable tablet 81 mg  81 mg Oral DAILY    enoxaparin (LOVENOX) injection 40 mg  40 mg SubCUTAneous Q24H    dextrose 40% (GLUTOSE) oral gel 1 Tube  15 g Oral PRN    glucagon (GLUCAGEN) injection 1 mg  1 mg IntraMUSCular PRN    dextrose (D50W) injection syrg 12.5-25 g  25-50 mL IntraVENous PRN       Diet:  DIET NUTRITIONAL SUPPLEMENTS  DIET DIABETIC CONSISTENT CARB    Other Studies (last 24 hours):  No results found.    Assessment and Plan:     Hospital Problems as of 4/4/2020 Date Reviewed: 3/3/2017          Codes Class Noted - Resolved POA    Hypomagnesemia ICD-10-CM: E83.42  ICD-9-CM: 275.2  3/7/2020 - Present Unknown        PFO (patent foramen ovale) ICD-10-CM: Q21.1  ICD-9-CM: 745.5  12/17/2019 - Present Yes        Arrhythmia ICD-10-CM: I49.9  ICD-9-CM: 427.9  12/15/2019 - Present Yes        Hyperlipemia ICD-10-CM: E78.5  ICD-9-CM: 272.4  12/15/2019 - Present Yes        * (Principal) Acute embolic stroke ([facility] [address] HIEN-36-KB: I63.9  ICD-9-CM: 434.11  12/12/2019 - Present Yes        Hypertension (Chronic) ICD-10-CM: I10  ICD-9-CM: 401.9  Unknown - Present Yes        Type 2 diabetes mellitus (HCC) (Chronic) ICD-10-CM: E11.9  ICD-9-CM: 250.00  Unknown - Present Yes              A/P:    Acute embolic stroke  MRI brain showed acute to early subacute infarcts in the left cerebellar hemisphere, in the periventricular deep left frontoparietal white matter and likely additional areas of restricted diffusion in the right paramedian yariel.    +PFO on echo  On ASA, statin  Will need 4 week event monitor on DC, if no arrhythmia then will need PFO closure      Hypomagnesemia  Resolved       Hypertension  Continue metoprolol and ACE inhibitor  Goal BP <130/80     Type 2 diabetes mellitus:    Monitor, BGL controlled        Signed:  Chad Godfrey NP

## 2020-04-04 NOTE — PROGRESS NOTES
Reviewed notes prior to visit for spiritual concerns  Patient has been followed by chaplains since admission  Resting peacefully    Patient is coping well with illness  He enjoys visits by chaplains        Ricky Verduzco, staff

## 2020-04-05 LAB — GLUCOSE BLD STRIP.AUTO-MCNC: 115 MG/DL (ref 65–100)

## 2020-04-05 PROCEDURE — 74011250636 HC RX REV CODE- 250/636: Performed by: INTERNAL MEDICINE

## 2020-04-05 PROCEDURE — 74011250637 HC RX REV CODE- 250/637: Performed by: INTERNAL MEDICINE

## 2020-04-05 PROCEDURE — 82962 GLUCOSE BLOOD TEST: CPT

## 2020-04-05 PROCEDURE — 65270000029 HC RM PRIVATE

## 2020-04-05 PROCEDURE — 74011250637 HC RX REV CODE- 250/637: Performed by: FAMILY MEDICINE

## 2020-04-05 PROCEDURE — 74011250637 HC RX REV CODE- 250/637: Performed by: HOSPITALIST

## 2020-04-05 RX ADMIN — GUAIFENESIN 100 MG: 100 SOLUTION ORAL at 16:23

## 2020-04-05 RX ADMIN — METFORMIN HYDROCHLORIDE 500 MG: 500 TABLET ORAL at 08:33

## 2020-04-05 RX ADMIN — ACETAMINOPHEN 650 MG: 325 TABLET, FILM COATED ORAL at 13:35

## 2020-04-05 RX ADMIN — Medication 400 MG: at 08:33

## 2020-04-05 RX ADMIN — DIPHENHYDRAMINE HYDROCHLORIDE 25 MG: 25 CAPSULE ORAL at 16:23

## 2020-04-05 RX ADMIN — ENOXAPARIN SODIUM 40 MG: 40 INJECTION SUBCUTANEOUS at 13:35

## 2020-04-05 RX ADMIN — ACETAMINOPHEN 650 MG: 325 TABLET, FILM COATED ORAL at 04:53

## 2020-04-05 RX ADMIN — LISINOPRIL 40 MG: 20 TABLET ORAL at 08:33

## 2020-04-05 RX ADMIN — ACETAMINOPHEN 650 MG: 325 TABLET, FILM COATED ORAL at 21:40

## 2020-04-05 RX ADMIN — ASPIRIN 81 MG 81 MG: 81 TABLET ORAL at 08:33

## 2020-04-05 RX ADMIN — ATORVASTATIN CALCIUM 80 MG: 80 TABLET, FILM COATED ORAL at 21:40

## 2020-04-05 RX ADMIN — METOPROLOL SUCCINATE 25 MG: 25 TABLET, FILM COATED, EXTENDED RELEASE ORAL at 08:33

## 2020-04-05 NOTE — PROGRESS NOTES
Reviewed notes for spiritual concerns prior to visit  Visit with patient to build rapport with . Calm  Encouraged with presence and words of hope    Patient demonstrates confidence in the care team.  Appears to be coping with illness.     He is in great spirits  Has his bible on the table with several verses marked        Oma Bianchi,  Staff   C: [phone]  /  [email or web address]

## 2020-04-05 NOTE — PROGRESS NOTES
04/05/20 0720   NIH Stroke Scale   Interval Other (comment)  (Dual NIH w/ Al, RN)   LOC 0   LOC Questions 0   LOC Commands 0   Best Gaze 0   Visual 0   Facial Palsy 1   Motor Right Arm 0   Motor Left Arm 2   Motor Right Leg 0   Motor Left Leg 2   Limb Ataxia 0   Sensory 0   Best Language 1   Dysarthria 1   Extinction and Inattention 0   Total 7

## 2020-04-05 NOTE — PROGRESS NOTES
Hospitalist Progress Note     Admit Date:  2019  9:07 AM   Name:  Emmanuel Lloyd   Age:  53 y.o.  :  1975   MRN:  [id number]   PCP:  Nick Schofield MD  Treatment Team: Attending Provider: Corey Hodge MD; Care Manager: Zhane Hawley [facility] [place]; Utilization Review: Santi Hudson RN; Nurse Practitioner: Bernice Pérez NP; Care Manager: Salazar Ramey RN; Charge Nurse: Adolfo TARIQ    Subjective:   HPI and or CC:  54 yo AA male with PMH of DM, HTN admitted  for CVA affecting numerous areas of the brain. He was placed on ASA and statin. He has remained alert and oriented x3. Some movement to right side but unable to grasp with right hand. He is currently waiting on placement and this has remained difficult due to waiting on Medicaid. :   Alert and oriented x3. Voices no complaints today. He remains afebrile, no leukocytosis. NIH remains 7. On RA with saturations mid 90s. Continues to wait on SSI and Medicaid approval.      Objective:     Patient Vitals for the past 24 hrs:   Temp Pulse Resp BP SpO2   20 0800 97.6 °F (36.4 °C) 69 18 120/81 95 %   20 0400 97.9 °F (36.6 °C) 78 18 113/79 98 %   20 0000 98.4 °F (36.9 °C) 80 18 113/81 93 %   20 2000 98.2 °F (36.8 °C) 72 18 120/78 98 %   20 1600 98 °F (36.7 °C) 71 18 114/76 97 %   20 1200 98.2 °F (36.8 °C) 76 17 126/81 95 %     Oxygen Therapy  O2 Sat (%): 95 % (20 0800)  Pulse via Oximetry: 75 beats per minute (20 0400)  O2 Device: Room air (20 0400)  No intake or output data in the 24 hours ending 20 0942      REVIEW OF SYSTEMS: Comprehensive ROS performed and negative except as stated in HPI. Physical Examination:  General:       No acute distress    Lungs:          CTA bilaterally. Resp even and nonlabored  Heart:            S1S2 present without murmurs rubs gallops. RRR. No LE edema  Abdomen:    Soft, non tender, non distended.  BS present  Extremities: No cyanosis. Left sided hemiparesis  Neurologic:  moves right hand and raises arm at elbow moderately, unable to squeeze my fingers left hand, mildly slurred speech.  PERRLA, strength 4/5 RUE/RLE. Data Review:  I have reviewed all labs, meds, telemetry events, and studies from the last 24 hours.     Recent Results (from the past 24 hour(s))   GLUCOSE, POC    Collection Time: 04/05/20  6:19 AM   Result Value Ref Range    Glucose (POC) 115 (H) 65 - 100 mg/dL        All Micro Results     None          Current Meds:  Current Facility-Administered Medications   Medication Dose Route Frequency    metFORMIN (GLUCOPHAGE) tablet 500 mg  500 mg Oral DAILY WITH BREAKFAST    magnesium oxide (MAG-OX) tablet 400 mg  400 mg Oral DAILY    acetaminophen (TYLENOL) tablet 650 mg  650 mg Oral Q4H PRN    diphenhydrAMINE (BENADRYL) capsule 25 mg  25 mg Oral Q6H PRN    acetaminophen (TYLENOL) tablet 650 mg  650 mg Oral Q8H    lip protectant (BLISTEX) ointment 1 Each  1 Each Topical PRN    metoprolol succinate (TOPROL-XL) XL tablet 25 mg  25 mg Oral DAILY    polyethylene glycol (MIRALAX) packet 17 g  17 g Oral DAILY PRN    guaiFENesin (ROBITUSSIN) 100 mg/5 mL oral liquid 100 mg  100 mg Oral Q4H PRN    hydrALAZINE (APRESOLINE) 20 mg/mL injection 20 mg  20 mg IntraVENous Q4H PRN    atorvastatin (LIPITOR) tablet 80 mg  80 mg Oral QHS    lisinopril (PRINIVIL, ZESTRIL) tablet 40 mg  40 mg Oral DAILY    sodium chloride (NS) flush 5-40 mL  5-40 mL IntraVENous PRN    ondansetron (ZOFRAN) injection 4 mg  4 mg IntraVENous Q4H PRN    aspirin chewable tablet 81 mg  81 mg Oral DAILY    enoxaparin (LOVENOX) injection 40 mg  40 mg SubCUTAneous Q24H    dextrose 40% (GLUTOSE) oral gel 1 Tube  15 g Oral PRN    glucagon (GLUCAGEN) injection 1 mg  1 mg IntraMUSCular PRN    dextrose (D50W) injection syrg 12.5-25 g  25-50 mL IntraVENous PRN       Diet:  DIET NUTRITIONAL SUPPLEMENTS  DIET DIABETIC CONSISTENT CARB    Other Studies (last 24 hours):  No results found. Assessment and Plan:     Hospital Problems as of 4/5/2020 Date Reviewed: 3/3/2017          Codes Class Noted - Resolved POA    Hypomagnesemia ICD-10-CM: E83.42  ICD-9-CM: 275.2  3/7/2020 - Present Unknown        PFO (patent foramen ovale) ICD-10-CM: Q21.1  ICD-9-CM: 745.5  12/17/2019 - Present Yes        Arrhythmia ICD-10-CM: I49.9  ICD-9-CM: 427.9  12/15/2019 - Present Yes        Hyperlipemia ICD-10-CM: E78.5  ICD-9-CM: 272.4  12/15/2019 - Present Yes        * (Principal) Acute embolic stroke ([facility] [address] [id number]: I63.9  ICD-9-CM: 434.11  12/12/2019 - Present Yes        Hypertension (Chronic) ICD-10-CM: I10  ICD-9-CM: 401.9  Unknown - Present Yes        Type 2 diabetes mellitus (HCC) (Chronic) ICD-10-CM: E11.9  ICD-9-CM: 250.00  Unknown - Present Yes              A/P:    Acute embolic stroke  MRI brain showed acute to early subacute infarcts in the left cerebellar hemisphere, in the periventricular deep left frontoparietal white matter and likely additional areas of restricted diffusion in the right paramedian yariel.    +PFO on echo  On ASA, statin  Will need 4 week event monitor on DC, if no arrhythmia then will need PFO closure      Hypomagnesemia  Resolved       Hypertension  Continue metoprolol and ACE inhibitor  Goal BP <130/80     Type 2 diabetes mellitus:    Monitor, BGL controlled        Signed:  Maryann Lala NP

## 2020-04-06 LAB — GLUCOSE BLD STRIP.AUTO-MCNC: 107 MG/DL (ref 65–100)

## 2020-04-06 PROCEDURE — 74011250637 HC RX REV CODE- 250/637: Performed by: HOSPITALIST

## 2020-04-06 PROCEDURE — 92507 TX SP LANG VOICE COMM INDIV: CPT

## 2020-04-06 PROCEDURE — 74011250637 HC RX REV CODE- 250/637: Performed by: INTERNAL MEDICINE

## 2020-04-06 PROCEDURE — 82962 GLUCOSE BLOOD TEST: CPT

## 2020-04-06 PROCEDURE — 97535 SELF CARE MNGMENT TRAINING: CPT

## 2020-04-06 PROCEDURE — 65270000029 HC RM PRIVATE

## 2020-04-06 PROCEDURE — 74011250637 HC RX REV CODE- 250/637: Performed by: FAMILY MEDICINE

## 2020-04-06 PROCEDURE — 97530 THERAPEUTIC ACTIVITIES: CPT

## 2020-04-06 PROCEDURE — 74011250636 HC RX REV CODE- 250/636: Performed by: INTERNAL MEDICINE

## 2020-04-06 RX ADMIN — ATORVASTATIN CALCIUM 80 MG: 80 TABLET, FILM COATED ORAL at 22:25

## 2020-04-06 RX ADMIN — Medication 400 MG: at 09:06

## 2020-04-06 RX ADMIN — LISINOPRIL 40 MG: 20 TABLET ORAL at 09:06

## 2020-04-06 RX ADMIN — ACETAMINOPHEN 650 MG: 325 TABLET, FILM COATED ORAL at 05:27

## 2020-04-06 RX ADMIN — DIPHENHYDRAMINE HYDROCHLORIDE 25 MG: 25 CAPSULE ORAL at 16:45

## 2020-04-06 RX ADMIN — ASPIRIN 81 MG 81 MG: 81 TABLET ORAL at 09:06

## 2020-04-06 RX ADMIN — ACETAMINOPHEN 650 MG: 325 TABLET, FILM COATED ORAL at 14:02

## 2020-04-06 RX ADMIN — METOPROLOL SUCCINATE 25 MG: 25 TABLET, FILM COATED, EXTENDED RELEASE ORAL at 09:07

## 2020-04-06 RX ADMIN — ACETAMINOPHEN 650 MG: 325 TABLET, FILM COATED ORAL at 22:25

## 2020-04-06 RX ADMIN — METFORMIN HYDROCHLORIDE 500 MG: 500 TABLET ORAL at 09:06

## 2020-04-06 RX ADMIN — GUAIFENESIN 100 MG: 100 SOLUTION ORAL at 16:45

## 2020-04-06 RX ADMIN — ENOXAPARIN SODIUM 40 MG: 40 INJECTION SUBCUTANEOUS at 14:04

## 2020-04-06 NOTE — PROGRESS NOTES
04/06/20 1517   NIH Stroke Scale   Interval Other (comment)  (dual with sylvie cortez)   LOC 0   LOC Questions 0   LOC Commands 0   Best Gaze 0   Visual 0   Facial Palsy 1   Motor Right Arm 0   Motor Left Arm 2   Motor Right Leg 0   Motor Left Leg 2   Limb Ataxia 0   Sensory 0   Best Language 1   Dysarthria 1   Extinction and Inattention 0   Total 7

## 2020-04-06 NOTE — PROGRESS NOTES
CM reviewed chart for updates on disability/Medicaid status. Per previous notes. Disability is still pending and Pt has been approved for medicaid through the [employer] program but this will not provide funding for nursing home or rehab placement. CM will continue to follow. ...

## 2020-04-06 NOTE — PROGRESS NOTES
Problem: Patient Education: Go to Patient Education Activity  Goal: Patient/Family Education  Outcome: Progressing Towards Goal     Problem: Pressure Injury - Risk of  Goal: *Prevention of pressure injury  Description: Document Dewey Scale and appropriate interventions in the flowsheet. Outcome: Progressing Towards Goal  Note: Pressure Injury Interventions:  Sensory Interventions: Assess changes in LOC, Float heels, Keep linens dry and wrinkle-free, Maintain/enhance activity level    Moisture Interventions: Absorbent underpads, Apply protective barrier, creams and emollients    Activity Interventions: Increase time out of bed, PT/OT evaluation    Mobility Interventions: HOB 30 degrees or less, PT/OT evaluation    Nutrition Interventions: Document food/fluid/supplement intake, Discuss nutritional consult with provider, Offer support with meals,snacks and hydration    Friction and Shear Interventions: Lift sheet                Problem: Patient Education: Go to Patient Education Activity  Goal: Patient/Family Education  Outcome: Progressing Towards Goal     Problem: Falls - Risk of  Goal: *Absence of Falls  Description: Document Jeanne Fall Risk and appropriate interventions in the flowsheet.   Outcome: Progressing Towards Goal  Note: Fall Risk Interventions:  Mobility Interventions: Bed/chair exit alarm, Mechanical lift, Patient to call before getting OOB, OT consult for ADLs    Mentation Interventions: Bed/chair exit alarm, Door open when patient unattended    Medication Interventions: Bed/chair exit alarm, Patient to call before getting OOB, Teach patient to arise slowly    Elimination Interventions: Bed/chair exit alarm, Call light in reach, Patient to call for help with toileting needs, Toileting schedule/hourly rounds    History of Falls Interventions: Door open when patient unattended, Evaluate medications/consider consulting pharmacy         Problem: Patient Education: Go to Patient Education Activity  Goal: Patient/Family Education  Outcome: Progressing Towards Goal     Problem: Diabetes Self-Management  Goal: *Disease process and treatment process  Description: Define diabetes and identify own type of diabetes; list 3 options for treating diabetes. Outcome: Progressing Towards Goal  Goal: *Incorporating nutritional management into lifestyle  Description: Describe effect of type, amount and timing of food on blood glucose; list 3 methods for planning meals. Outcome: Progressing Towards Goal  Goal: *Incorporating physical activity into lifestyle  Description: State effect of exercise on blood glucose levels. Outcome: Progressing Towards Goal  Goal: *Developing strategies to promote health/change behavior  Description: Define the ABC's of diabetes; identify appropriate screenings, schedule and personal plan for screenings. Outcome: Progressing Towards Goal  Goal: *Using medications safely  Description: State effect of diabetes medications on diabetes; name diabetes medication taking, action and side effects. Outcome: Progressing Towards Goal  Goal: *Monitoring blood glucose, interpreting and using results  Description: Identify recommended blood glucose targets  and personal targets. Outcome: Progressing Towards Goal  Goal: *Prevention, detection, treatment of acute complications  Description: List symptoms of hyper- and hypoglycemia; describe how to treat low blood sugar and actions for lowering  high blood glucose level. Outcome: Progressing Towards Goal  Goal: *Prevention, detection and treatment of chronic complications  Description: Define the natural course of diabetes and describe the relationship of blood glucose levels to long term complications of diabetes.   Outcome: Progressing Towards Goal  Goal: *Developing strategies to address psychosocial issues  Description: Describe feelings about living with diabetes; identify support needed and support network  Outcome: Progressing Towards Goal     Problem: Patient Education: Go to Patient Education Activity  Goal: Patient/Family Education  Outcome: Progressing Towards Goal     Problem: Patient Education: Go to Patient Education Activity  Goal: Patient/Family Education  Outcome: Progressing Towards Goal     Problem: Nutrition Deficit  Goal: *Optimize nutritional status  Outcome: Progressing Towards Goal     Problem: Patient Education: Go to Patient Education Activity  Goal: Patient/Family Education  Outcome: Progressing Towards Goal     Problem: General Medical Care Plan  Goal: *Vital signs within specified parameters  Outcome: Progressing Towards Goal  Goal: *Labs within defined limits  Outcome: Progressing Towards Goal  Goal: *Absence of infection signs and symptoms  Description: Wash hand more often   Outcome: Progressing Towards Goal  Goal: *Optimal pain control at patient's stated goal  Outcome: Progressing Towards Goal  Goal: *Skin integrity maintained  Outcome: Progressing Towards Goal  Goal: *Fluid volume balance  Outcome: Progressing Towards Goal  Goal: *Optimize nutritional status  Outcome: Progressing Towards Goal  Goal: *Anxiety reduced or absent  Outcome: Progressing Towards Goal  Goal: *Progressive mobility and function (eg: ADL's)  Outcome: Progressing Towards Goal     Problem: Patient Education: Go to Patient Education Activity  Goal: Patient/Family Education  Outcome: Progressing Towards Goal     Problem: Patient Education: Go to Patient Education Activity  Goal: Patient/Family Education  Outcome: Progressing Towards Goal     Problem: Patient Education: Go to Patient Education Activity  Goal: Patient/Family Education  Outcome: Progressing Towards Goal     Problem: Patient Education: Go to Patient Education Activity  Goal: Patient/Family Education  Outcome: Progressing Towards Goal     Problem: Ischemic Stroke: Discharge Outcomes  Goal: *Verbalizes anxiety and depression are reduced or absent  Outcome: Progressing Towards Goal  Goal: *Verbalize understanding of risk factor modification(Stroke Metric)  Outcome: Progressing Towards Goal  Goal: *Hemodynamically stable  Outcome: Progressing Towards Goal  Goal: *Absence of aspiration pneumonia  Outcome: Progressing Towards Goal  Goal: *Aware of needed dietary changes  Outcome: Progressing Towards Goal  Goal: *Verbalize understanding of prescribed medications including anti-coagulants, anti-lipid, and/or anti-platelets(Stroke Metric)  Outcome: Progressing Towards Goal  Goal: *Tolerating diet  Outcome: Progressing Towards Goal  Goal: *Aware of follow-up diagnostics related to anticoagulants  Outcome: Progressing Towards Goal  Goal: *Ability to perform ADLs and demonstrates progressive mobility and function  Outcome: Progressing Towards Goal  Goal: *Absence of DVT(Stroke Metric)  Outcome: Progressing Towards Goal  Goal: *Absence of aspiration  Outcome: Progressing Towards Goal  Goal: *Optimal pain control at patient's stated goal  Outcome: Progressing Towards Goal  Goal: *Home safety concerns addressed  Outcome: Progressing Towards Goal  Goal: *Describes available resources and support systems  Outcome: Progressing Towards Goal  Goal: *Verbalizes understanding of activation of EMS(911) for stroke symptoms(Stroke Metric)  Outcome: Progressing Towards Goal  Goal: *Understands and describes signs and symptoms to report to providers(Stroke Metric)  Outcome: Progressing Towards Goal  Goal: *Neurolgocially stable (absence of additional neurological deficits)  Outcome: Progressing Towards Goal  Goal: *Verbalizes importance of follow-up with primary care physician(Stroke Metric)  Outcome: Progressing Towards Goal  Goal: *Smoking cessation discussed,if applicable(Stroke Metric)  Outcome: Progressing Towards Goal  Goal: *Depression screening completed(Stroke Metric)  Outcome: Progressing Towards Goal     Problem: Pain  Goal: *Control of Pain  Outcome: Progressing Towards Goal     Problem: Patient Education: Go to Patient Education Activity  Goal: Patient/Family Education  Outcome: Progressing Towards Goal     Problem: Patient Education: Go to Patient Education Activity  Goal: Patient/Family Education  Outcome: Progressing Towards Goal

## 2020-04-06 NOTE — ROUTINE PROCESS
Physical Therapy Note: Attempted to see patient this AM for physical therapy treatment session. Patient currently working with SLP. Will follow and re-attempt. Thank you, Timmy Jones, PT, DPT

## 2020-04-06 NOTE — PROGRESS NOTES
Reviewed notes for spiritual concerns prior to visit  Visit with patient to build rapport with . Calm  Encouraged with presence and words of hope    Patient demonstrates confidence in the care team.  Appears to be coping with illness.     luis carlos Hannon,  Staff   C: [phone]  /  Bryant@[employer]

## 2020-04-06 NOTE — PROGRESS NOTES
Problem: Self Care Deficits Care Plan (Adult)  Goal: *Acute Goals and Plan of Care (Insert Text)  Description  Goals per Re-evaluation on 3/18/2020:   1. Patient will demonstrate appropriate safety awareness and protection of L UE during bed mobility and functional transfers with minimal cues. (Progressing, still needs cues, 3/18/2020)  2. Patient will complete total body bathing and dressing with Min A and adaptive equipment as needed. (Progressing, UB bathing Min A, UB dressing at [address] [place] [address] , LB dressing at [facility], 3/18/2020)  3. Patient will complete weightbearing into the L UE with ADL tasks with minimal assistance to improve ability to use as a functional assist during ADL tasks. (Progressing, 3/18/2020)4. Patient will demonstrate L UE SROM HEP within 7 days. (Progressing, 3/18/2020)  5. Pt will complete bed mobility with Mod I and minimal verbal cueing (Progressing, Supervision, 3/25/2020). 6. Pt will complete dynamic sitting balance for ADLs at mod I with good balance (Progressing, 3/18/2020)  7. Pt will complete functional transfers with Supervision with equipment as needed. (Progressing, SBA 3/18/2020)  8. Pt will complete grooming tasks in standing at sink level x5 mins with good balance and equipment as needed (Progressing, CGA with fair balance 3/25/2020)  9. Pt will complete grooming standing at sink level with SBA and use of adaptive equipment as needed. 10. Pt will don/doff UE sling with SBA and use of minimal verbal and visual cueing for correct application (Progressing, Min A 3/25/2020). Goals per Re-evaluation on 3/27/2020:   1. Patient will demonstrate appropriate safety awareness and protection of L UE during bed mobility and functional transfers with no verbal cues. 2. Patient will complete lower body bathing and dressing with Min A and adaptive equipment as needed. 3. Patient will complete upper body bathing and dressing with SBA and adaptive equipment as needed.   4. Patient will complete weightbearing into the L UE with ADL tasks with minimal assistance to improve ability to use as a functional assist during ADL tasks. 5. Patient will demonstrate L UE SROM HEP within 7 days. 6. Pt will complete bed mobility with Mod I and no verbal cueing. 7. Pt will complete functional transfers with Supervision with equipment as needed. 8. Pt will don/doff UE sling with Mod I and no verbal cueing. 9. Pt will complete toileting with SBA for toilet transfer and gown management. Outcome: Progressing Towards Goal     OCCUPATIONAL THERAPY: Daily Note and PM    4/6/2020  INPATIENT: OT Visit Days: 4  Payor: MEDICAID PENDING / Plan: Nimisha Colon PENDING / Product Type: Medicaid /      NAME/AGE/GENDER: Jenine Oppenheim is a 53 y.o. male   PRIMARY DIAGNOSIS:  Acute ischemic stroke ([facility] [I63.9]  Cerebrovascular accident (CVA) due to embolism ([facility] [X36.1] Acute embolic stroke ([facility] Acute embolic stroke ([facility]       ICD-10: Treatment Diagnosis:    · Generalized Muscle Weakness (M62.81)  · Other lack of cordination (R27.8)  · Hemiplegia and hemiparesis following cerebral infarction affecting   · left non-dominant side (I69.354)  · Abnormal posture (R29.3)   Precautions/Allergies:    NO PULLING ON LUE   LUE in sling with shoulder joint approximated and supported, needs taping   Patient has no known allergies. ASSESSMENT:     Mr. Osmar Davey presents to the hospital with L sided weakness and acute ischemic CVA. MRI revealed acute to subacute L cerebellar and R paramedian yariel infarcts. 4/6/2020: Upon arrival, pt supine in bed. Reports increased desire to perform nail hygiene and OT suggests to perform seated in recliner chair. Pt completes supine to sit with Min A. Pt possibly with increased fatigue today as he has been performing bed mobility with CGA/SBA in prior sessions. Good static and dynamic seated balance. Pt requires Min A to don L UE sling. Completes sit to stand with CGA and use of luke walker.  Pt walks with CGA, dragging of left foot, and slower than usual. Completes stand to sit with SBA. OT requested pt to attempt again due to need for placement of carlos and pt completes transfer with increased independence, requiring SBA and use of keanu walker. Pt scoots further back in chair with SBA. Pt requires Max A to file nails with nail file with most assist needed to file nails on R hand and pt able to file nails on left while OT stabilizes each finger. Dinner tray arrives and pt requires Max A to doff cap from protein shake, but only SBA to doff lid from tea. Pt left seated upright in recliner to eat dinner. All needs met and within reach at this time. RN notified. Will continue POC. This section established at most recent assessment   PROBLEM LIST (Impairments causing functional limitations):  1. Decreased Strength  2. Decreased ADL/Functional Activities  3. Decreased Transfer Abilities  4. Decreased Ambulation Ability/Technique  5. Decreased Balance  6. Decreased Activity Tolerance  7. Increased Fatigue  8. Decreased Flexibility/Joint Mobility  9. Decreased Knowledge of Precautions  10. Decreased [place] with Home Exercise Program   INTERVENTIONS PLANNED: (Benefits and precautions of occupational therapy have been discussed with the patient.)  1. Activities of daily living training  2. Adaptive equipment training  3. Balance training  4. Clothing management  5. Cognitive training  6. Donning&doffing training  7. Keanu tech training  8. Neuromuscular re-eduation  9. Therapeutic activity  10. Therapeutic exercise     TREATMENT PLAN: Frequency/Duration: Follow patient 3 times per week to address above goals. Rehabilitation Potential For Stated Goals: Excellent     REHAB RECOMMENDATIONS (at time of discharge pending progress):    Placement:   It is my opinion, based on this patient's performance to date, that Mr. Elvin Farooq may benefit from intensive therapy at an [address] [place] [address] after discharge due to potential to make ongoing and sustainable functional improvement that is of practical value. Sayner Plaster Pt functioning far below independent baseline, demonstrating good improvement and participation. Pt would likely benefit greatly and increase independence from inpatient rehab stay. Equipment:    TBD               OCCUPATIONAL PROFILE AND HISTORY:   History of Present Injury/Illness (Reason for Referral):  See H&P  Past Medical History/Comorbidities:   Mr. Naun [place]  has a past medical history of Acute ischemic stroke ([address] (12/12/2019), Acute pancreatitis (11/19/2014), Cerebrovascular accident (CVA) due to embolism ([address] (12/12/2019), Diabetes ([address] (2002), Diabetes ([address] Diabetes mellitus, and Hypertension. He also has no past medical history of Arthritis, Asthma, Autoimmune disease ([address] CAD (coronary artery disease), Cancer ([address] Chronic kidney disease, COPD, Dementia, Dementia ([address] Heart failure ([address] Ill-defined condition, Infectious disease, Liver disease, Other ill-defined conditions(799.89), Psychiatric disorder, PUD (peptic ulcer disease), Seizures ([address] or Sleep disorder. Mr. Naun [place]  has a past surgical history that includes hx hernia repair and hx orthopaedic. Social History/Living Environment:   Home Environment: Apartment  # Steps to Enter: 12  Rails to Enter: Yes  [employer] : Bilateral  One/Two Story Residence: One story  Living Alone: No  Support Systems: Spouse/Significant Other/Partner  Patient Expects to be Discharged to[de-identified] Unknown  Current DME Used/Available at Home: None  Tub or Shower Type: Tub/Shower combination  Prior Level of Function/Work/Activity:  Pt lives at home with his wife. Pt is typically independent with ADL/functional mobility. Pt does not drive. Pt was working part-time at [employer]. Personal Factors:          Age:  53 y.o.         Past/Current Experience:  CVA with flaccid L side        Other factors that influence how disability is experienced by the patient:  multiple co-morbidities    Number of Personal Factors/Comorbidities that affect the Plan of Care: Extensive review of physical, cognitive, and psychosocial performance (3+):  HIGH COMPLEXITY   ASSESSMENT OF OCCUPATIONAL PERFORMANCE[de-identified]   Activities of Daily Living:   Basic ADLs (From Assessment) Complex ADLs (From Assessment)   Feeding: Setup  Oral Facial Hygiene/Grooming: Minimum assistance  Bathing: Moderate assistance  Upper Body Dressing: Minimum assistance  Lower Body Dressing: Maximum assistance  Toileting: Moderate assistance Instrumental ADL  Meal Preparation: Total assistance  Homemaking: Total assistance  Medication Management: Total assistance  Financial Management: Total assistance   Grooming/Bathing/Dressing Activities of Daily Living   Grooming  Grooming Assistance: Maximum assistance(File nails)  Position Performed: Seated in chair     Upper Body Bathing  Bathing Assistance: Min A   Position Performed: Seated in chair  Feeding  Feeding Assistance: Minimum assistance  Container Management: Maximum assistance(Cap from protein shake. SBA lid for tea. )               Lower Body Dressing Assistance  Socks:  Total assistance (dependent) Bed/Mat Mobility  Supine to Sit: Minimum assistance  Sit to Stand: Contact guard assistance;Stand-by assistance(CGA from bed; SBA from recliner chair)  Stand to Sit: Stand-by assistance  Bed to Chair: Contact guard assistance  Scooting: Stand-by assistance     Most Recent Physical Functioning:   Gross Assessment:                  Posture:     Balance:  Sitting: Intact  Sitting - Static: Good (unsupported)  Sitting - Dynamic: Good (unsupported)  Standing: Impaired  Standing - Static: Good  Standing - Dynamic : Fair Bed Mobility:  Supine to Sit: Minimum assistance  Scooting: Stand-by assistance  Wheelchair Mobility:  Distance (ft): 350 feet  Level of Assistance: Stand-by assistance  Interventions: Safety awareness training;Verbal cues  Transfers:  Sit to Stand: Contact guard assistance;Stand-by assistance(CGA from bed; SBA from recliner chair)  Stand to Sit: Stand-by assistance  Bed to Chair: Contact guard assistance            Patient Vitals for the past 6 hrs:   BP BP Patient Position SpO2 Pulse   20 1200 120/75 At rest 97 % 69   20 1600 104/72 At rest 96 % 80       Mental Status  Neurologic State: Alert  Orientation Level: Oriented X4  Cognition: Follows commands  Perception: Cues to attend to left side of body, Cues to maintain midline in sitting, Cues to maintain midline in standing, Tactile, Verbal, Visual  Perseveration: No perseveration noted  Safety/Judgement: Fall prevention                          Physical Skills Involved:  1. Range of Motion  2. Balance  3. Strength  4. Activity Tolerance  5. Fine Motor Control  6. Gross Motor Control Cognitive Skills Affected (resulting in the inability to perform in a timely and safe manner):  1. Perception  2. Expression Psychosocial Skills Affected:  1. Habits/Routines  2. Environmental Adaptation  3. Social Interaction  4. Emotional Regulation  5. Self-Awareness  6. Awareness of Others  7. Social Roles   Number of elements that affect the Plan of Care: 5+:  HIGH COMPLEXITY   CLINICAL DECISION MAKIN[address] [place] [id number] AM-PAC 6 Clicks   Daily Activity Inpatient Short Form  How much help from another person does the patient currently need. .. Total A Lot A Little None   1. Putting on and taking off regular lower body clothing? [] 1   [x] 2   [] 3   [] 4   2. Bathing (including washing, rinsing, drying)? [] 1   [] 2   [x] 3   [] 4   3. Toileting, which includes using toilet, bedpan or urinal?   [] 1   [] 2   [x] 3   [] 4   4. Putting on and taking off regular upper body clothing? [] 1   [] 2   [x] 3   [] 4   5. Taking care of personal grooming such as brushing teeth? [] 1   [] 2   [x] 3   [] 4   6. Eating meals?    [] 1   [] 2   [x] 3   [] 4   © , Trustees of [address] [place] [address] [address] [id number], under license to [employer]. All rights reserved      Score:  Initial: 11 Most Recent: 15 (Date: 3/18/2020 ); 17 (3/27/2020)    Interpretation of Tool:  Represents activities that are increasingly more difficult (i.e. Bed mobility, Transfers, Gait). Medical Necessity:     · Patient demonstrates   · good and excellent  ·  rehab potential due to higher previous functional level. Reason for Services/Other Comments:  · Patient continues to require skilled intervention due to   · Decreased independence with ADL/functional transfers that impacts overall quality of life. · .   Use of outcome tool(s) and clinical judgement create a POC that gives a: MODERATE COMPLEXITY         TREATMENT:   (In addition to Assessment/Re-Assessment sessions the following treatments were rendered)     Pre-treatment Symptoms/Complaints:  \"Can we work on my nails. \"  Pain: Initial:   Pain Intensity 1: 0 Post Session: Unchanged     Self Care: (27 minutes): Procedure(s) utilized to improve and/or restore self-care/home management as related to dressing, grooming and self feeding. Required moderate visual, verbal and manual cueing to facilitate activities of daily living skills and compensatory activities. Pt requires Min A to don L UE sling. Pt requires Max A to file nails with nail file with most assist needed to file nails on R hand and pt able to file nails on left while OT stabilizes each finger. Dinner tray arrives and pt requires Max A to doff cap from protein shake, but only SBA to doff lid from tea. Braces/Orthotics/Lines/Etc:   · O2 device: Room air  · LUE sling  Treatment/Session Assessment:    · Response to Treatment:  Pt with increased assist needed for bed mobility today.   · Interdisciplinary Collaboration:   o Occupational Therapist  o Registered Nurse  · After treatment position/precautions:   o Up in chair  o Bed/Chair-wheels locked  o Bed in low position  o Call light within reach  o RN notified   o [address] [place] [address] [place] in Place · Compliance with Program/Exercises: Compliant all of the time, Will assess as treatment progresses. · Recommendations/Intent for next treatment session: \"Next visit will focus on advancements to more challenging activities and reduction in assistance provided\".   Total Treatment Duration:   OT Patient Time In/Time Out  Time In: 1604  Time Out: 1631     Mary Fairchild

## 2020-04-06 NOTE — PROGRESS NOTES
Problem: Mobility Impaired (Adult and Pediatric)  Goal: *Acute Goals and Plan of Care  Description  GOALS UPDATED ON RE-ASSESSMENT 4/1/2020 (advancements to goals in bold):  1. Patient will perform bed mobility with supervision and 0 verbal cues within 7 treatment days. 2. Patient will perform transfer bed to chair with SUPERVISION within 7 treatment days. 3. Patient will demonstrate fair+ dynamic balance throughout stance phase on L LE within 7 treatment days. 4. Patient will require STAND BY ASSIST throughout swing phase on (to advance) L LE within 7 treatment days. 5. Patient demonstrate 3+/5 strength in L LE hip flexion, hip abduction, and hip adduction within 7 treatment days. 6. Patient will ambulate 200ft+ with STAND BY ASSIST and least retrictive assistive device within 7 treatment days. 7. Patient will perform wheelchair mobility x75ft with MODIFIED INDEPENDENCE within 7 treatment days. 8. Mr. Shanell Cueva will be independent with wheelchair locking/unlocking mechanism as well as leg rest manipulation within 7 days to improve safety and independence. 9. Mr. Shanell Cueva will don/doff LUE sling for protection of L shoulder during transfers/gait within 7 treatment days. PHYSICAL THERAPY: Daily Note and PM 4/6/2020  INPATIENT: PT Visit Days : 4  Payor: MEDICAID PENDING / Plan: [place] PENDING / Product Type: Medicaid /       NAME/AGE/GENDER: Hazel Waldron is a 53 y.o. male   PRIMARY DIAGNOSIS: Acute ischemic stroke ([address] [I63.9]  Cerebrovascular accident (CVA) due to embolism ([address] [U67.9] Acute embolic stroke ([address] Acute embolic stroke ([address]       ICD-10: Treatment Diagnosis:   · Difficulty in walking, Not elsewhere classified (R26.2)  · Hemiplegia and hemiparesis following cerebral infarction affecting left non-dominant side (I61.862)   Precaution/Allergies:  Patient has no known allergies. ASSESSMENT:     Mr. Shanell Cueva is a 53year old admitted R MCA CVA.   Patient was supine upon contact and agreeable to PT. Patient performs supine to sit with supervision and additional time. Patient attempted to don sling on LUE which he required mod assist and cues for technique. Patient transfers to standing with CGA-SBA. Patient then ambulates 200' then 125' with luke walker and focus on positioning of L foot during strike (heel first) and stance phase on L to improve external rotation. Patient needed cues periodically throughout gait to improve this and have proper gait sequencing. Wheelchair in tow for safety. Once seated rest break in between ambulation bouts. Gait mechanics decline as patient fatigues requiring increased cues. Patient then performs wheelchair mobility x 350' with several rest breaks and cues for improved/proper technique. Patient returns to his room where he was left sitting up in wheelchair. Overall good progress towards physical therapy goals. Goals listed above are still appropriate. Will continue efforts as patient is still below functional baseline. This section established at most recent assessment   PROBLEM LIST (Impairments causing functional limitations):  1. Decreased Strength  2. Decreased ADL/Functional Activities  3. Decreased Transfer Abilities  4. Decreased Ambulation Ability/Technique  5. Decreased Balance  6. Increased Pain  7. Decreased Activity Tolerance  8. Increased Fatigue  9. Decreased Flexibility/Joint Mobility   INTERVENTIONS PLANNED: (Benefits and precautions of physical therapy have been discussed with the patient.)  1. Balance Exercise  2. Bed Mobility  3. Family Education  4. Gait Training  5. Home Exercise Program (HEP)  6. Manual Therapy  7. Neuromuscular Re-education/Strengthening  8. Range of Motion (ROM)  9. Therapeutic Activites  10. Therapeutic Exercise/Strengthening  11.  Transfer Training     TREATMENT PLAN: Frequency/Duration: 3 times a week for duration of hospital stay  Rehabilitation Potential For Stated Goals: Good     REHAB RECOMMENDATIONS (at time of discharge pending progress):    Placement: It is my opinion, based on this patient's performance to date, that Mr. Kyung Cisse may benefit from intensive therapy at an [facility] after discharge due to a probable need for close medical supervision by a rehab physician, a probable need for multiple therapy disciplines and potential to make ongoing and sustainable functional improvement that is of practical value. .  Equipment:    TBD pending progress with therapy. HISTORY:   History of Present Injury/Illness (Reason for Referral):  Per H&P: \"Pt is a 52 y/o smoker with DM, HTN, who presented to ER with L leg and arm weakness, L facial droop, dysarthria. First noted L leg weakness late night 12/11 when he woke up to go to the bathroom. He was normal when he went to bed around 1030. Woke up this morning and had persistent weakness L leg and also now noted in L arm. EMS called. Noted to have slurred speech and L facial droop as well. Code S called in ER around 9am.  MRI with acute infarcts in L cerebellar hemisphere, deep frontoparietal white matter, and R paramedian yariel. Also noted old lacunar infarcts. No large vessel occlusion on CTA, but some stenosis noted. No hx afib, TIA, CVA. No CP, palpitations, SOB. \"  Past Medical History/Comorbidities:   Mr. Kyung Cisse  has a past medical history of Acute ischemic stroke ([address] (12/12/2019), Acute pancreatitis (11/19/2014), Cerebrovascular accident (CVA) due to embolism ([address] (12/12/2019), Diabetes ([address] (2002), Diabetes ([address] Diabetes mellitus, and Hypertension.  He also has no past medical history of Arthritis, Asthma, Autoimmune disease ([address] CAD (coronary artery disease), Cancer ([address] Chronic kidney disease, COPD, Dementia, Dementia ([address] Heart failure ([address] Ill-defined condition, Infectious disease, Liver disease, Other ill-defined conditions(799.89), Psychiatric disorder, PUD (peptic ulcer disease), Seizures ([facility] or Sleep disorder. Mr. Sherine Joshi  has a past surgical history that includes hx hernia repair and hx orthopaedic. Social History/Living Environment:   Home Environment: Apartment  # Steps to Enter: 12  Rails to Enter: Yes  [employer] : Bilateral  One/Two Story Residence: One story  Living Alone: No  Support Systems: Spouse/Significant Other/Partner  Patient Expects to be Discharged to[de-identified] Unknown  Current DME Used/Available at Home: None  Tub or Shower Type: Tub/Shower combination  Prior Level of Function/Work/Activity:  Independent, lives with wife in 2nd story 1 level apartment. No recent falls. Number of Personal Factors/Comorbidities that affect the Plan of Care: 1-2: MODERATE COMPLEXITY   EXAMINATION:   Most Recent Physical Functioning:   Gross Assessment:                  Posture:     Balance:  Sitting: Intact  Standing: Impaired  Standing - Static: Good  Standing - Dynamic : Fair Bed Mobility:  Supine to Sit: Supervision; Additional time  Wheelchair Mobility:  Distance (ft): 350 feet  Level of Assistance: Stand-by assistance  Interventions: Safety awareness training;Verbal cues  Transfers:  Sit to Stand: Stand-by assistance  Stand to Sit: Stand-by assistance  Gait:     Base of Support: Narrowed; Center of gravity altered  Speed/Clotilde: Slow  Step Length: Left shortened;Right shortened  Gait Abnormalities: Decreased step clearance;Trunk sway increased; Step to gait  Distance (ft): (200' then 125')  Assistive Device: Walker luke  Ambulation - Level of Assistance: Contact guard assistance(mod assist for one major LOB)  Interventions: Safety awareness training;Verbal cues      Body Structures Involved:  1. Nerves  2. Voice/Speech  3. Bones  4. Joints  5. Muscles Body Functions Affected:  1. Mental  2. Sensory/Pain  3. Neuromusculoskeletal  4. Movement Related Activities and Participation Affected:  1. General Tasks and Demands  2. Mobility  3. Self Care  4.  Interpersonal Interactions and Relationships   Number of elements that affect the Plan of Care: 4+: HIGH COMPLEXITY   CLINICAL PRESENTATION:   Presentation: Evolving clinical presentation with changing clinical characteristics: MODERATE COMPLEXITY   CLINICAL DECISION MAKIN [facility] Short Form  How much difficulty does the patient currently have. .. Unable A Lot A Little None   1. Turning over in bed (including adjusting bedclothes, sheets and blankets)? [] 1   [] 2   [] 3   [x] 4   2. Sitting down on and standing up from a chair with arms ( e.g., wheelchair, bedside commode, etc.)   [] 1   [] 2   [x] 3   [] 4   3. Moving from lying on back to sitting on the side of the bed? [] 1   [] 2   [x] 3   [] 4   How much help from another person does the patient currently need. .. Total A Lot A Little None   4. Moving to and from a bed to a chair (including a wheelchair)? [] 1   [] 2   [x] 3   [] 4   5. Need to walk in hospital room? [] 1   [] 2   [x] 3   [] 4   6. Climbing 3-5 steps with a railing? [] 1   [x] 2   [] 3   [] 4   © , Trustees of [address] [place] [address], under license to [employer]. All rights reserved      Score:  Initial: 13 Most Recent: 18 (Date:3/9/2020)    Interpretation of Tool:  Represents activities that are increasingly more difficult (i.e. Bed mobility, Transfers, Gait). Medical Necessity:     · Patient is expected to demonstrate progress in   · strength, range of motion, balance, coordination, and functional technique  ·  to   · increase independence with all mobility. · .  Reason for Services/Other Comments:  · Patient continues to require skilled intervention due to   · medical complications and mobility deficits which impact his level of function, safety, and independence as indicated above.    · .   Use of outcome tool(s) and clinical judgement create a POC that gives a: Questionable prediction of patient's progress: MODERATE COMPLEXITY TREATMENT:      Pre-treatment Symptoms/Complaints:  none  Pain: Initial: 0/10 Post Session:  0/10     Therapeutic Activity: (    40 Minutes): Therapeutic activities including bed mobility training, transfer training, ambulation on level ground, instruction in sequencing with luke walker, scootiing, donning sling to LUE, stance and swing phase of LLE, wheelchair mechanics and mobility, and patient education  to improve mobility, strength and balance. Required moderate Safety awareness training;Verbal cues to promote static and dynamic balance in standing and promote coordination of bilateral, lower extremity(s). Braces/Orthotics/Lines/Etc:   · Sling to LUE  Treatment/Session Assessment:    · Response to Treatment:  See above  · Interdisciplinary Collaboration:   o Physical Therapy Assistant  o Registered Nurse  · After treatment position/precautions:   o Up in chair  o Bed/Chair-wheels locked  o Call light within reach  o RN notified   · Compliance with Program/Exercises: Compliant all of the time  · Recommendations/Intent for next treatment session: \"Next visit will focus on advancements to more challenging activities and reduction in assistance provided\".     Total Treatment Duration:  PT Patient Time In/Time Out  Time In: 1255  Time Out: 142 [facility], [facility]

## 2020-04-06 NOTE — PROGRESS NOTES
Progress Note    2020  Admit Date: 2019  9:07 AM   NAME: Henry Smiley   :  1975   MRN:  [id number]   Attending: Lavinia Mcgee,*  PCP:  Tania Closs, MD  Treatment Team: Attending Provider: Aviva Cooper MD; Care Manager: Kevin Castillo LMSW; Utilization Review: Sara Oconnell RN; Nurse Practitioner: Osvaldo Carter NP; Care Manager: Juan Mayen RN; Nurse Practitioner: Zak Kaur NP; Physical Therapist: Tru Hinojosa DPT; Occupational Therapist: Marifer Rangel    Full Code   SUBJECTIVE:   Mr. Breanna Colbert is a 54 yo male with PMH of DM, HTN who presented with c/o left sided weakness and left facial droop 19.   CT head showed age indeterminate infarctions within the left corona radiata/caudate.  CTA head/neck showed stenosis at the distal segment of the right vertebral artery and multifocal stenosis in the P2 segment of the left posterior cerebral artery. MRI brain showed acute to early subacute infarcts in the left cerebellar hemisphere, in the periventricular deep left frontoparietal white matter and likely additional areas of restricted diffusion in the right paramedian yariel.   Echo +PFO.  On statin, ASA.  Has been difficult to place in STR. Plans for 4 week event monitor on DC and if no arrhythmia PFO closure recommended. Pt remains stable today. Awaiting placement, medicaid pending. Denies complaints.      Past Medical History:   Diagnosis Date    Acute ischemic stroke ([address] 2019    Acute pancreatitis 2014    Cerebrovascular accident (CVA) due to embolism ([address] 2019    Diabetes ([address] 2002    Diabetes ([facility]     Diabetes mellitus     Hypertension      Recent Results (from the past 24 hour(s))   GLUCOSE, POC    Collection Time: 20  6:10 AM   Result Value Ref Range    Glucose (POC) 107 (H) 65 - 100 mg/dL     No Known Allergies  Current Facility-Administered Medications   Medication Dose Route Frequency Provider Last Rate Last Dose    metFORMIN (GLUCOPHAGE) tablet 500 mg  500 mg Oral DAILY WITH Stephanie Wright MD   500 mg at 04/06/20 5544    magnesium oxide (MAG-OX) tablet 400 mg  400 mg Oral DAILY Yumiko Chowdhury MD   400 mg at 04/06/20 1785    acetaminophen (TYLENOL) tablet 650 mg  650 mg Oral Q4H PRN Berto Borja MD   650 mg at 04/04/20 1738    diphenhydrAMINE (BENADRYL) capsule 25 mg  25 mg Oral Q6H PRN Petr Dover MD   25 mg at 04/05/20 1623    acetaminophen (TYLENOL) tablet 650 mg  650 mg Oral Keila Angelo MD   650 mg at 04/06/20 8849    lip protectant (BLISTEX) ointment 1 Each  1 Each Topical PRN Mat Sapp MD        metoprolol succinate (TOPROL-XL) XL tablet 25 mg  25 mg Oral DAILY Berto Borja MD   25 mg at 04/06/20 9580    polyethylene glycol (MIRALAX) packet 17 g  17 g Oral DAILY PRN Mirella Conroy C, DO   17 g at 02/23/20 1708    guaiFENesin (ROBITUSSIN) 100 mg/5 mL oral liquid 100 mg  100 mg Oral Q4H PRN Berto Borja MD   100 mg at 04/05/20 1623    hydrALAZINE (APRESOLINE) 20 mg/mL injection 20 mg  20 mg IntraVENous Q4H PRN Vivien Rosas MD   20 mg at 12/23/19 0133    atorvastatin (LIPITOR) tablet 80 mg  80 mg Oral QHS Vivien Rosas MD   80 mg at 04/05/20 2140    lisinopril (PRINIVIL, ZESTRIL) tablet 40 mg  40 mg Oral DAILY Vivien Rosas MD   40 mg at 04/06/20 3967    sodium chloride (NS) flush 5-40 mL  5-40 mL IntraVENous PRN Vivien Rosas MD   10 mL at 04/01/20 0548    ondansetron (ZOFRAN) injection 4 mg  4 mg IntraVENous Q4H PRN Vivien Rosas MD        aspirin chewable tablet 81 mg  81 mg Oral DAILY Vivien Rosas MD   81 mg at 04/06/20 3130    enoxaparin (LOVENOX) injection 40 mg  40 mg SubCUTAneous Q24H Vivien Rosas MD   40 mg at 04/05/20 1335    dextrose 40% (GLUTOSE) oral gel 1 Tube  15 g Oral PRN Vivien Rosas MD        glucagon [facility]) injection 1 mg  1 mg IntraMUSCular PRN Patrick Christie MD        dextrose (D50W) injection syrg 12.5-25 g  25-50 mL IntraVENous PRN Patrick Christie MD           Review of Systems negative with exception of pertinent positives noted above  PHYSICAL EXAM     Visit Vitals  /70 (BP 1 Location: Right arm, BP Patient Position: At rest)   Pulse 71   Temp 98 °F (36.7 °C)   Resp 16   Ht 5' 6\" (1.676 m)   Wt 95.3 kg (210 lb)   SpO2 97%   BMI 33.89 kg/m²      Temp (24hrs), Av.9 °F (36.6 °C), Min:97.6 °F (36.4 °C), Max:98.2 °F (36.8 °C)    Oxygen Therapy  O2 Sat (%): 97 % (20 0800)  Pulse via Oximetry: 75 beats per minute (20 0400)  O2 Device: Room air (20 0400)    Intake/Output Summary (Last 24 hours) at 2020 1128  Last data filed at 2020 0400  Gross per 24 hour   Intake 60 ml   Output    Net 60 ml      General:       No acute distress    Lungs:          CTA bilaterally. Resp even and nonlabored  Heart:            S1S2 present without murmurs rubs gallops. RRR. No LE edema  Abdomen:    Soft, non tender, non distended. BS present  Extremities: No cyanosis. Left sided hemiparesis  Neurologic:  moves right hand and raises arm at elbow moderately, unable to squeeze my fingers left hand, mildly slurred speech.  PERRLA, strength 4/5 RUE/RLE. Results summary of Diagnostic Studies/Procedures copied from within [facility] EMR:     Edmar Dixon 96 Problems    Diagnosis Date Noted    Hypomagnesemia 2020    PFO (patent foramen ovale) 2019    Arrhythmia 12/15/2019    Hyperlipemia     Acute embolic stroke ([facility] 9357    Type 2 diabetes mellitus ([facility]     Hypertension      Plan:    Acute embolic stroke  MRI brain showed acute to early subacute infarcts in the left cerebellar hemisphere, in the periventricular deep left frontoparietal white matter and likely additional areas of restricted diffusion in the right paramedian yariel.    +PFO on echo  On ASA, statin  Will need 4 week event monitor on DC, if no arrhythmia then will need PFO closure      Hypomagnesemia  Received IV, and on oral supplementation.      Hypertension  Continue metoprolol and ACE inhibitor  Goal BP <130/80     Type 2 diabetes mellitus:    Monitor, BGL controlled         Medically stable for DC.  Awaiting Medicaid approval.  May take months for approval.         Notes, labs, VS, diagnostic testing reviewed  Time spent with pt 35 min        DVT Prophylaxis: lovenox  Plan of Care Discussed with: Supervising MD Dr. Tyrese Nicolas, care team, pt          Rachael Ashraf NP

## 2020-04-06 NOTE — PROGRESS NOTES
Neurolinguistic Goals: goals per progress note 3/10/2020  LTG: Patient will increase neuro-linguistic abilities to increase safety and awareness in functional living environment   STG: Patient will solve mathematical problems with 80%accuracy given minimal cueing. Not met 3/10/2020  STG: Patient will name 10 items to  abstract category in 1 minute given  moderate cueing. Progressing 1/31/2020. MET&edited 3/10/2020  STG: Patient will participate in verbal reasoning tasks with 80% accuracy given minimal cueing. Progressing 3/10/2020  STG: Patient will complete mental manipulation tasks with 80% accuracy given minimal cueing. Not met 3/10/2020  STG: Patient will complete working memory/attention tasks with 80% accuracy given minimal cueing. Progressing 3/10/2020  STG: Patient will recall relevant verbal information with 80% accuracy with minimal assistance. Progressing 3/10/2020  STG: Patient will utilize memory compensatory strategies to improve recall of functional list/message with 90%accuracy given minimal assistance. Progressing 3/10/2020     Dysarthria Goals:  LTG: Patient will develop functional and intelligible speech and utilize compensatory strategies to improve communication across environments. STG: Patient will utilize compensatory strategies at conversation level with 90% accuracy independently.  Progressing 3/10/2020      SPEECH LANGUAGE PATHOLOGY: SPEECH-LANGUAGE/COGNITION: Daily Note 7    NAME/AGE/GENDER: Henry Smiley is a 53 y.o. male  DATE: 4/6/2020  PRIMARY DIAGNOSIS: Acute ischemic stroke ([facility] [I63.9]  Cerebrovascular accident (CVA) due to embolism ([facility] [I63.9]      ICD-10: Treatment Diagnosis: R47.1 Dysarthria and Anarthria  R41.841 Cognitive-Communication Deficit    RECOMMENDATIONS   TREATMENT RECOMMENDATIONS:    Continue to follow for cognitive linguistic treatment     STRATEGIES:    Speech strategies: slow rate/over articulate to improve articulatory precision   Memory strategies: repeat and write down novel/relevant information     EDUCATION:  · Recommendations discussed with Patient   Continuation of Skilled Services/Medical Necessity:   Patient is expected to demonstrate progress in expressive communication and cognitive ability to decrease assistance required communication, increase independence with activities of daily living and increase communication with family/caregivers.  Patient continues to require skilled intervention due to cognitive linguistic deficits and ongoing dysarthria      RECOMMENDATIONS for CONTINUED SPEECH THERAPY: YES: Anticipate need for ongoing speech therapy during this hospitalization and at next level of care. ASSESSMENT   Patient completed basic functional math calculations when determining cost of bill utilizing restaurant menu independently; however, continues to require increased time for calculations. Reduced reasoning skills when asked to stay within a given budget when determining menu items he could order. Minimally improved working memory when given 4 given words to sequence in correct order to generate sentences. Frequent repetition of word list continues to be required. Patient reports he is at baseline in regards to ability to complete math calculation. Short term memory not at baseline; however, patient is able to memory tasks when encourage to utilize compensatory strategies. Will follow up x1 for carryover of memory compensatory strategies. COMPLIANCE WITH PROGRAM/EXERCISES: Compliant most of the time  REHABILITATION POTENTIAL FOR STATED GOALS: Fair  PLAN    FREQUENCY/DURATION: Continue to follow patient 2 times a week for duration of hospital stay to address above goals. - Recommendations for next treatment session: Next treatment will address cognitive linguistic abilities     SUBJECTIVE   Upright in bed. Upon questioning states he is at his baseline in regards to accuracy with math calculations.  He does endorse that his memory is not back to baseline; however, states he doesn't want to work on his memory for much longer. Orientation:   Person  Place  Time  Situation     Pain: Pain Scale 1: Numeric (0 - 10)  Pain Intensity 1: 0    OBJECTIVE   -Answered functional math questions with use of restaurant menu: 4/5 accurate  -Speech 100% intelligible when given mock order.   -Working memory/mental manipulation: sequenced words in logical order to generate sentences: given 3 words with 4/5 accurate independently; 4 words with 5/5 accurate given moderate verbal cues/repetition. Tool Used: MODIFIED BRITT SCALE (mRS)   Score   No Symptoms  [] 0   No significant disability despite symptoms; able to carry out all usual duties and activities  [] 1   Slight disability; unable to carry out all previous activities but able to look after own affairs without assistance. [] 2   Moderate disability; requiring some help but able to walk without assistance  [x] 3   Moderately severe disability; unable to walk without assistance and unable to attend to own bodily needs without assistance  [] 4   Severe disability; bedridden, incontinent, and requiring constant nursing care and attention  [] 5      Score:  Initial: 3 Current: 3   Interpretation of Tool: The Modified Beverly Scale is a 7-point scaled used to quantify level of disability as it relates to a patient's functional abilities. INTERDISCIPLINARY COLLABORATION: Registered Nurse  PRECAUTIONS/ALLERGIES: Patient has no known allergies.      SAFETY:  After treatment position/precautions:  · Supine in bed  · Call light within reach    Total Treatment Duration:     Time In: 1026  Time Out: Dima Sandhu [address] [facility] [address], [address] [facility]

## 2020-04-07 PROCEDURE — 97535 SELF CARE MNGMENT TRAINING: CPT

## 2020-04-07 PROCEDURE — 65270000029 HC RM PRIVATE

## 2020-04-07 PROCEDURE — 74011250637 HC RX REV CODE- 250/637: Performed by: INTERNAL MEDICINE

## 2020-04-07 PROCEDURE — 74011250637 HC RX REV CODE- 250/637: Performed by: FAMILY MEDICINE

## 2020-04-07 PROCEDURE — 97530 THERAPEUTIC ACTIVITIES: CPT

## 2020-04-07 PROCEDURE — 74011250637 HC RX REV CODE- 250/637: Performed by: HOSPITALIST

## 2020-04-07 PROCEDURE — 97112 NEUROMUSCULAR REEDUCATION: CPT

## 2020-04-07 PROCEDURE — 74011250636 HC RX REV CODE- 250/636: Performed by: INTERNAL MEDICINE

## 2020-04-07 RX ADMIN — ASPIRIN 81 MG 81 MG: 81 TABLET ORAL at 08:55

## 2020-04-07 RX ADMIN — DIPHENHYDRAMINE HYDROCHLORIDE 25 MG: 25 CAPSULE ORAL at 18:01

## 2020-04-07 RX ADMIN — ACETAMINOPHEN 650 MG: 325 TABLET, FILM COATED ORAL at 06:02

## 2020-04-07 RX ADMIN — ACETAMINOPHEN 650 MG: 325 TABLET, FILM COATED ORAL at 22:16

## 2020-04-07 RX ADMIN — ATORVASTATIN CALCIUM 80 MG: 80 TABLET, FILM COATED ORAL at 22:16

## 2020-04-07 RX ADMIN — METFORMIN HYDROCHLORIDE 500 MG: 500 TABLET ORAL at 08:56

## 2020-04-07 RX ADMIN — GUAIFENESIN 100 MG: 100 SOLUTION ORAL at 18:01

## 2020-04-07 RX ADMIN — ACETAMINOPHEN 650 MG: 325 TABLET, FILM COATED ORAL at 14:48

## 2020-04-07 RX ADMIN — METOPROLOL SUCCINATE 25 MG: 25 TABLET, FILM COATED, EXTENDED RELEASE ORAL at 08:56

## 2020-04-07 RX ADMIN — LISINOPRIL 40 MG: 20 TABLET ORAL at 08:55

## 2020-04-07 RX ADMIN — Medication 400 MG: at 08:56

## 2020-04-07 RX ADMIN — ENOXAPARIN SODIUM 40 MG: 40 INJECTION SUBCUTANEOUS at 14:47

## 2020-04-07 RX ADMIN — Medication 10 ML: at 06:02

## 2020-04-07 NOTE — PROGRESS NOTES
Problem: Self Care Deficits Care Plan (Adult)  Goal: *Acute Goals and Plan of Care (Insert Text)  Description  Goals per Re-evaluation on 3/18/2020:   1. Patient will demonstrate appropriate safety awareness and protection of L UE during bed mobility and functional transfers with minimal cues. (Progressing, still needs cues, 3/18/2020)  2. Patient will complete total body bathing and dressing with Min A and adaptive equipment as needed. (Progressing, UB bathing Min A, UB dressing at [address] [place] [address] , LB dressing at [facility], 3/18/2020)  3. Patient will complete weightbearing into the L UE with ADL tasks with minimal assistance to improve ability to use as a functional assist during ADL tasks. (Progressing, 3/18/2020)4. Patient will demonstrate L UE SROM HEP within 7 days. (Progressing, 3/18/2020)  5. Pt will complete bed mobility with Mod I and minimal verbal cueing (Progressing, Supervision, 3/25/2020). 6. Pt will complete dynamic sitting balance for ADLs at mod I with good balance (Progressing, 3/18/2020)  7. Pt will complete functional transfers with Supervision with equipment as needed. (Progressing, SBA 3/18/2020)  8. Pt will complete grooming tasks in standing at sink level x5 mins with good balance and equipment as needed (Progressing, CGA with fair balance 3/25/2020)  9. Pt will complete grooming standing at sink level with SBA and use of adaptive equipment as needed. 10. Pt will don/doff UE sling with SBA and use of minimal verbal and visual cueing for correct application (Progressing, Min A 3/25/2020). Goals per Re-evaluation on 3/27/2020:   1. Patient will demonstrate appropriate safety awareness and protection of L UE during bed mobility and functional transfers with no verbal cues. 2. Patient will complete lower body bathing and dressing with Min A and adaptive equipment as needed. 3. Patient will complete upper body bathing and dressing with SBA and adaptive equipment as needed.   4. Patient will complete weightbearing into the L UE with ADL tasks with minimal assistance to improve ability to use as a functional assist during ADL tasks. 5. Patient will demonstrate L UE SROM HEP within 7 days. 6. Pt will complete bed mobility with Mod I and no verbal cueing. 7. Pt will complete functional transfers with Supervision with equipment as needed. 8. Pt will don/doff UE sling with Mod I and no verbal cueing. 9. Pt will complete toileting with SBA for toilet transfer and gown management. Outcome: Progressing Towards Goal     OCCUPATIONAL THERAPY: Daily Note and AM    4/7/2020  INPATIENT: OT Visit Days: 5  Payor: MEDICAID PENDING / Plan: Tulio Carpio PENDING / Product Type: Medicaid /      NAME/AGE/GENDER: Joe Silveira is a 53 y.o. male   PRIMARY DIAGNOSIS:  Acute ischemic stroke ([facility] [I63.9]  Cerebrovascular accident (CVA) due to embolism ([facility] [D37.1] Acute embolic stroke ([facility] Acute embolic stroke ([facility]       ICD-10: Treatment Diagnosis:    · Generalized Muscle Weakness (M62.81)  · Other lack of cordination (R27.8)  · Hemiplegia and hemiparesis following cerebral infarction affecting   · left non-dominant side (I69.354)  · Abnormal posture (R29.3)   Precautions/Allergies:    NO PULLING ON LUE   LUE in sling with shoulder joint approximated and supported, needs taping   Patient has no known allergies. ASSESSMENT:     Mr. Aristeo Rascon presents to the hospital with L sided weakness and acute ischemic CVA. MRI revealed acute to subacute L cerebellar and R paramedian yariel infarcts. 4/7/2020: Pt supine in the bed upon arrival with lights out. Pt agreeable to OT treatment. Pt demonstrates flat affect throughout session. Pt demonstrates a lot of compensations with functional transfers and sit to stand with trunk rotation to the R and putting most of his weight in the R LE with sit to stand. Pt encouraged for more midline orientation with sit to stand tasks.  Pt completes functional mobility with CGA/min A using the keanu-walker over to chair in the sink. Pt noted to have some hyperextension at the R knee with functional mobility. Pt completed bathing task with encouragement to use the L UE as a functional assist with maximal cues. Pt has a lot of learned non-use with L UE. Pt used B UEs fo holding washcloth and washing his face. Pt encouraged to use L UE for bathing R arm and L leg with hand over hand assistance and maximal assistance for guiding the arm. Pt encouraged for better posture during bathing task (tends to sit in posterior pelvic tilt and neck flexion). Pt educated on keanu technique for donning gown. Pt completed activities with cane to promote forward reach with facilitation and mobilization at the scapula for protraction/retraction. Pt worked on standing sink side with B UEs on the sink and pt working on shifting weight to the R. Pt with limited ROM at the L wrist with some pain reported with attempts to bear weight. Pt set-up in recliner chair at end of session. Pt worked on using L UE as stabilizer while opening drinks. Pt to continue per plan of care. This section established at most recent assessment   PROBLEM LIST (Impairments causing functional limitations):  1. Decreased Strength  2. Decreased ADL/Functional Activities  3. Decreased Transfer Abilities  4. Decreased Ambulation Ability/Technique  5. Decreased Balance  6. Decreased Activity Tolerance  7. Increased Fatigue  8. Decreased Flexibility/Joint Mobility  9. Decreased Knowledge of Precautions  10. Decreased [place] with Home Exercise Program   INTERVENTIONS PLANNED: (Benefits and precautions of occupational therapy have been discussed with the patient.)  1. Activities of daily living training  2. Adaptive equipment training  3. Balance training  4. Clothing management  5. Cognitive training  6. Donning&doffing training  7. Keanu tech training  8. Neuromuscular re-eduation  9. Therapeutic activity  10.  Therapeutic exercise     TREATMENT PLAN: Frequency/Duration: Follow patient 3 times per week to address above goals. Rehabilitation Potential For Stated Goals: Excellent     REHAB RECOMMENDATIONS (at time of discharge pending progress):    Placement: It is my opinion, based on this patient's performance to date, that Mr. Teresa Christine may benefit from intensive therapy at an [address] [place] [address] after discharge due to potential to make ongoing and sustainable functional improvement that is of practical value. Arlene Art Pt functioning far below independent baseline, demonstrating good improvement and participation. Pt would likely benefit greatly and increase independence from inpatient rehab stay. Equipment:    TBD               OCCUPATIONAL PROFILE AND HISTORY:   History of Present Injury/Illness (Reason for Referral):  See H&P  Past Medical History/Comorbidities:   Mr. Teresa Christine  has a past medical history of Acute ischemic stroke ([address] (12/12/2019), Acute pancreatitis (11/19/2014), Cerebrovascular accident (CVA) due to embolism ([address] (12/12/2019), Diabetes ([address] (2002), Diabetes ([address] Diabetes mellitus, and Hypertension. He also has no past medical history of Arthritis, Asthma, Autoimmune disease ([address] CAD (coronary artery disease), Cancer ([address] Chronic kidney disease, COPD, Dementia, Dementia ([address] Heart failure ([address] Ill-defined condition, Infectious disease, Liver disease, Other ill-defined conditions(799.89), Psychiatric disorder, PUD (peptic ulcer disease), Seizures ([address] or Sleep disorder. Mr. Teresa Christine  has a past surgical history that includes hx hernia repair and hx orthopaedic.   Social History/Living Environment:   Home Environment: Apartment  # Steps to Enter: 12  Rails to Enter: Yes  [employer] : Bilateral  One/Two Story Residence: One story  Living Alone: No  Support Systems: Spouse/Significant Other/Partner  Patient Expects to be Discharged to[de-identified] Unknown  Current DME Used/Available at Home: None  Tub or Shower Type: Tub/Shower combination  Prior Level of Function/Work/Activity:  Pt lives at home with his wife. Pt is typically independent with ADL/functional mobility. Pt does not drive. Pt was working part-time at [employer]. Personal Factors:          Age:  53 y.o. Past/Current Experience:  CVA with flaccid L side        Other factors that influence how disability is experienced by the patient:  multiple co-morbidities    Number of Personal Factors/Comorbidities that affect the Plan of Care: Extensive review of physical, cognitive, and psychosocial performance (3+):  HIGH COMPLEXITY   ASSESSMENT OF OCCUPATIONAL PERFORMANCE[de-identified]   Activities of Daily Living:   Basic ADLs (From Assessment) Complex ADLs (From Assessment)   Feeding: Setup  Oral Facial Hygiene/Grooming: Minimum assistance  Bathing: Moderate assistance  Upper Body Dressing: Minimum assistance  Lower Body Dressing: Maximum assistance  Toileting: Moderate assistance Instrumental ADL  Meal Preparation: Total assistance  Homemaking: Total assistance  Medication Management: Total assistance  Financial Management: Total assistance   Grooming/Bathing/Dressing Activities of Daily Living   Grooming  Washing Face: Stand-by assistance  Washing Hands: Stand-by assistance Cognitive Retraining  Safety/Judgement: Fall prevention;Home safety   Upper Body Bathing  Bathing Assistance: Min A   Position Performed: Seated in chair      Lower Body Bathing  Lower Body : Minimum assistance  Position Performed: Seated in chair     Upper [address]: Minimum  assistance; Moderate assistance Functional Transfers  Bathroom Mobility: Contact guard assistance   Lower Body Dressing Assistance  Socks:  Total assistance (dependent) Bed/Mat Mobility  Supine to Sit: Minimum assistance  Sit to Stand: Contact guard assistance;Minimum assistance  Stand to Sit: Contact guard assistance  Bed to Chair: Contact guard assistance  Scooting: Minimum assistance     Most Recent Physical Functioning:   Gross Assessment:  AROM: (R UE WFL; L UE non-functional AROM)  PROM: Generally decreased, functional(L UE)  Strength: (R UE WFL; L UE non-functional; flaccid)  Coordination: (non-functional in the L UE)  Tone: Abnormal(hypotonic in L UE)  Sensation: Intact(reports intact grossly to light touch)               Posture:     Balance:  Sitting: Impaired  Sitting - Static: Good (unsupported)  Sitting - Dynamic: Fair (occasional)  Standing: Impaired  Standing - Static: Fair  Standing - Dynamic : Fair Bed Mobility:  Supine to Sit: Minimum assistance  Scooting: Minimum assistance  Wheelchair Mobility:     Transfers:  Sit to Stand: Contact guard assistance;Minimum assistance  Stand to Sit: Contact guard assistance  Bed to Chair: Contact guard assistance            Patient Vitals for the past 6 hrs:   BP BP Patient Position SpO2 Pulse   20 0800 126/78 At rest 98 % 64   20 1200 132/89 Sitting 98 % 73       Mental Status  Neurologic State: Alert  Orientation Level: Oriented X4  Cognition: Appropriate for age attention/concentration, Follows commands  Perception: Cues to attend to left side of body  Perseveration: No perseveration noted  Safety/Judgement: Fall prevention, Home safety                          Physical Skills Involved:  1. Range of Motion  2. Balance  3. Strength  4. Activity Tolerance  5. Fine Motor Control  6. Gross Motor Control Cognitive Skills Affected (resulting in the inability to perform in a timely and safe manner):  1. Perception  2. Expression Psychosocial Skills Affected:  1. Habits/Routines  2. Environmental Adaptation  3. Social Interaction  4. Emotional Regulation  5. Self-Awareness  6. Awareness of Others  7. Social Roles   Number of elements that affect the Plan of Care: 5+:  HIGH COMPLEXITY   CLINICAL DECISION MAKIN [facility] [address] [id number] AM-PAC 6 Clicks   Daily Activity Inpatient Short Form  How much help from another person does the patient currently need. ..  Total A Lot A Little None   1. Putting on and taking off regular lower body clothing? [] 1   [x] 2   [] 3   [] 4   2. Bathing (including washing, rinsing, drying)? [] 1   [] 2   [x] 3   [] 4   3. Toileting, which includes using toilet, bedpan or urinal?   [] 1   [] 2   [x] 3   [] 4   4. Putting on and taking off regular upper body clothing? [] 1   [] 2   [x] 3   [] 4   5. Taking care of personal grooming such as brushing teeth? [] 1   [] 2   [x] 3   [] 4   6. Eating meals? [] 1   [] 2   [x] 3   [] 4   © 2007, Trustees of [address] [place] [address] [address] [id number], under license to [employer]. All rights reserved      Score:  Initial: 11 Most Recent: 15 (Date: 3/18/2020 ); 17 (3/27/2020)    Interpretation of Tool:  Represents activities that are increasingly more difficult (i.e. Bed mobility, Transfers, Gait). Medical Necessity:     · Patient demonstrates   · good and excellent  ·  rehab potential due to higher previous functional level. Reason for Services/Other Comments:  · Patient continues to require skilled intervention due to   · Decreased independence with ADL/functional transfers that impacts overall quality of life. · .   Use of outcome tool(s) and clinical judgement create a POC that gives a: MODERATE COMPLEXITY         TREATMENT:   (In addition to Assessment/Re-Assessment sessions the following treatments were rendered)     Pre-treatment Symptoms/Complaints:  \"Can we work on my nails. \"  Pain: Initial:   Pain Intensity 1: 0 Post Session: Unchanged     Self Care: (20): Procedure(s) (per grid) utilized to improve and/or restore self-care/home management as related to dressing, bathing, grooming and self feeding. Required moderate visual, verbal, manual and tactile cueing to facilitate activities of daily living skills, compensatory activities and facilitation for using L UE as a functional assist.  Therapeutic Activity: (    10):   Therapeutic activities including Bed transfers, Chair transfers and Ambulation on level ground to improve mobility, strength, balance and coordination. Required minimal assistance   to promote static and dynamic balance in standing.     n/a  Neuromuscular Re-education: ( 24):  Exercise/activities per grid below to improve balance, coordination and posture. Required moderate visual, verbal, manual and tactile cues to promote dynamic balance in standing and promote motor control of left, upper extremity(s). Date:  4/7/20 Date:   Date:     Activity/Exercise Parameters Parameters Parameters   Midline orientation sit to stand  Moderate cues and facilitation to decrease rotation at trunk and for midline trunk position     Weightbearing into L side standing at sink  Minimal facilitation at the L UE; ~10 reps      Forward reach with cane 10 reps with facilitation at the elbow/scapula      External rotation with cane 10 reps with minimal facilitation      Posture  Upright posture with thoracic extension and B hands place in the lap                    Braces/Orthotics/Lines/Etc:   · O2 device: Room air  · LUE sling  Treatment/Session Assessment:    · Response to Treatment:  Pt with increased assist needed for bed mobility today. · Interdisciplinary Collaboration:   o Occupational Therapist  o Registered Nurse  · After treatment position/precautions:   o Up in chair  o Bed alarm/tab alert on  o Bed/Chair-wheels locked  o Bed in low position  o Call light within reach  o RN notified   · Compliance with Program/Exercises: Compliant all of the time, Will assess as treatment progresses. · Recommendations/Intent for next treatment session: \"Next visit will focus on advancements to more challenging activities and reduction in assistance provided\".   Total Treatment Duration:   OT Patient Time In/Time Out  Time In: 1055  Time Out: 99 [facility],

## 2020-04-07 NOTE — PROGRESS NOTES
Problem: Mobility Impaired (Adult and Pediatric)  Goal: *Acute Goals and Plan of Care  Description  GOALS UPDATED ON RE-ASSESSMENT 4/1/2020 (advancements to goals in bold):  1. Patient will perform bed mobility with supervision and 0 verbal cues within 7 treatment days. 2. Patient will perform transfer bed to chair with SUPERVISION within 7 treatment days. 3. Patient will demonstrate fair+ dynamic balance throughout stance phase on L LE within 7 treatment days. 4. Patient will require STAND BY ASSIST throughout swing phase on (to advance) L LE within 7 treatment days. 5. Patient demonstrate 3+/5 strength in L LE hip flexion, hip abduction, and hip adduction within 7 treatment days. 6. Patient will ambulate 200ft+ with STAND BY ASSIST and least retrictive assistive device within 7 treatment days. 7. Patient will perform wheelchair mobility x75ft with MODIFIED INDEPENDENCE within 7 treatment days. 8. Mr. Anjel Gil will be independent with wheelchair locking/unlocking mechanism as well as leg rest manipulation within 7 days to improve safety and independence. 9. Mr. Anjel Gil will don/doff LUE sling for protection of L shoulder during transfers/gait within 7 treatment days. PHYSICAL THERAPY: Daily Note and PM 4/7/2020  INPATIENT: PT Visit Days : 5  Payor: MEDICAID PENDING / Plan: Nusrat Swanson PENDING / Product Type: Medicaid /       NAME/AGE/GENDER: Dora Neal is a 53 y.o. male   PRIMARY DIAGNOSIS: Acute ischemic stroke ([address] [I63.9]  Cerebrovascular accident (CVA) due to embolism ([address] [K36.4] Acute embolic stroke ([address] Acute embolic stroke ([address]       ICD-10: Treatment Diagnosis:   · Difficulty in walking, Not elsewhere classified (R26.2)  · Hemiplegia and hemiparesis following cerebral infarction affecting left non-dominant side (B99.403)   Precaution/Allergies:  Patient has no known allergies. ASSESSMENT:     Mr. Anjel Gil is a 53year old admitted R MCA CVA.   Patient was supine upon contact and agreeable to PT. Patient performs supine to sit with supervision and additional time. Patient attempted to don sling on LUE on his own power which he required mod assist and cues for technique. Patient transfers to standing with CGA-SBA. Patient then ambulates 350' with luke walker, and focus on positioning of L foot during strike (heel first) and stance phase on L to improve external rotation. Patient needed cues periodically throughout gait to improve this and have proper gait sequencing. Wheelchair in tow for safety. Gait mechanics decline as patient fatigues requiring increased cues. Patient then performs wheelchair mobility x 150' with several rest breaks and cues for improved/proper technique. Patient returns to his room where he was left sitting up in wheelchair. Overall good progress towards physical therapy goals. Goals listed above are still appropriate. Will continue efforts as patient is still below functional baseline. This section established at most recent assessment   PROBLEM LIST (Impairments causing functional limitations):  1. Decreased Strength  2. Decreased ADL/Functional Activities  3. Decreased Transfer Abilities  4. Decreased Ambulation Ability/Technique  5. Decreased Balance  6. Increased Pain  7. Decreased Activity Tolerance  8. Increased Fatigue  9. Decreased Flexibility/Joint Mobility   INTERVENTIONS PLANNED: (Benefits and precautions of physical therapy have been discussed with the patient.)  1. Balance Exercise  2. Bed Mobility  3. Family Education  4. Gait Training  5. Home Exercise Program (HEP)  6. Manual Therapy  7. Neuromuscular Re-education/Strengthening  8. Range of Motion (ROM)  9. Therapeutic Activites  10. Therapeutic Exercise/Strengthening  11.  Transfer Training     TREATMENT PLAN: Frequency/Duration: 3 times a week for duration of hospital stay  Rehabilitation Potential For Stated Goals: Good     REHAB RECOMMENDATIONS (at time of discharge pending progress): Placement: It is my opinion, based on this patient's performance to date, that Mr. Joanne Beach may benefit from intensive therapy at an [facility] after discharge due to a probable need for close medical supervision by a rehab physician, a probable need for multiple therapy disciplines and potential to make ongoing and sustainable functional improvement that is of practical value. .  Equipment:    TBD pending progress with therapy. HISTORY:   History of Present Injury/Illness (Reason for Referral):  Per H&P: \"Pt is a 52 y/o smoker with DM, HTN, who presented to ER with L leg and arm weakness, L facial droop, dysarthria. First noted L leg weakness late night 12/11 when he woke up to go to the bathroom. He was normal when he went to bed around 1030. Woke up this morning and had persistent weakness L leg and also now noted in L arm. EMS called. Noted to have slurred speech and L facial droop as well. Code S called in ER around 9am.  MRI with acute infarcts in L cerebellar hemisphere, deep frontoparietal white matter, and R paramedian yariel. Also noted old lacunar infarcts. No large vessel occlusion on CTA, but some stenosis noted. No hx afib, TIA, CVA. No CP, palpitations, SOB. \"  Past Medical History/Comorbidities:   Mr. Joanne Beach  has a past medical history of Acute ischemic stroke ([address] (12/12/2019), Acute pancreatitis (11/19/2014), Cerebrovascular accident (CVA) due to embolism ([address] (12/12/2019), Diabetes ([address] (2002), Diabetes ([address] Diabetes mellitus, and Hypertension. He also has no past medical history of Arthritis, Asthma, Autoimmune disease ([address] CAD (coronary artery disease), Cancer ([address] Chronic kidney disease, COPD, Dementia, Dementia ([address] Heart failure ([address] Ill-defined condition, Infectious disease, Liver disease, Other ill-defined conditions(799.89), Psychiatric disorder, PUD (peptic ulcer disease), Seizures ([address] or Sleep disorder.   Mr. Joanne Beach  has a past surgical history that includes hx hernia repair and hx orthopaedic. Social History/Living Environment:   Home Environment: Apartment  # Steps to Enter: 12  Rails to Enter: Yes  [employer] : Bilateral  One/Two Story Residence: One story  Living Alone: No  Support Systems: Spouse/Significant Other/Partner  Patient Expects to be Discharged to[de-identified] Unknown  Current DME Used/Available at Home: None  Tub or Shower Type: Tub/Shower combination  Prior Level of Function/Work/Activity:  Independent, lives with wife in 2nd story 1 level apartment. No recent falls. Number of Personal Factors/Comorbidities that affect the Plan of Care: 1-2: MODERATE COMPLEXITY   EXAMINATION:   Most Recent Physical Functioning:   Gross Assessment:                  Posture:     Balance:  Sitting: Intact  Standing: Impaired  Standing - Static: Good  Standing - Dynamic : Fair Bed Mobility:  Supine to Sit: Supervision  Wheelchair Mobility:     Transfers:  Sit to Stand: Stand-by assistance  Stand to Sit: Stand-by assistance  Gait:     Base of Support: Narrowed; Center of gravity altered  Speed/Clotilde: Slow  Step Length: Left shortened;Right shortened  Gait Abnormalities: Decreased step clearance;Trunk sway increased; Step to gait  Distance (ft): 350 Feet (ft)  Assistive Device: Walker luke  Ambulation - Level of Assistance: Contact guard assistance  Interventions: Safety awareness training;Verbal cues      Body Structures Involved:  1. Nerves  2. Voice/Speech  3. Bones  4. Joints  5. Muscles Body Functions Affected:  1. Mental  2. Sensory/Pain  3. Neuromusculoskeletal  4. Movement Related Activities and Participation Affected:  1. General Tasks and Demands  2. Mobility  3. Self Care  4.  Interpersonal Interactions and Relationships   Number of elements that affect the Plan of Care: 4+: HIGH COMPLEXITY   CLINICAL PRESENTATION:   Presentation: Evolving clinical presentation with changing clinical characteristics: [address] [place] [address] AM-PAC 6 Clicks   Basic Mobility Inpatient Short Form  How much difficulty does the patient currently have. .. Unable A Lot A Little None   1. Turning over in bed (including adjusting bedclothes, sheets and blankets)? [] 1   [] 2   [] 3   [x] 4   2. Sitting down on and standing up from a chair with arms ( e.g., wheelchair, bedside commode, etc.)   [] 1   [] 2   [x] 3   [] 4   3. Moving from lying on back to sitting on the side of the bed? [] 1   [] 2   [x] 3   [] 4   How much help from another person does the patient currently need. .. Total A Lot A Little None   4. Moving to and from a bed to a chair (including a wheelchair)? [] 1   [] 2   [x] 3   [] 4   5. Need to walk in hospital room? [] 1   [] 2   [x] 3   [] 4   6. Climbing 3-5 steps with a railing? [] 1   [x] 2   [] 3   [] 4   © , Trustees of [address] [place] [id number], under license to [employer]. All rights reserved      Score:  Initial: 13 Most Recent: 18 (Date:3/9/2020)    Interpretation of Tool:  Represents activities that are increasingly more difficult (i.e. Bed mobility, Transfers, Gait). Medical Necessity:     · Patient is expected to demonstrate progress in   · strength, range of motion, balance, coordination, and functional technique  ·  to   · increase independence with all mobility. · .  Reason for Services/Other Comments:  · Patient continues to require skilled intervention due to   · medical complications and mobility deficits which impact his level of function, safety, and independence as indicated above. · .   Use of outcome tool(s) and clinical judgement create a POC that gives a: Questionable prediction of patient's progress: MODERATE COMPLEXITY        TREATMENT:      Pre-treatment Symptoms/Complaints:  none  Pain: Initial: 0/10 Post Session:  0/10     Therapeutic Activity: (    24 Minutes):   Therapeutic activities including bed mobility training, transfer training, ambulation on level ground, instruction in sequencing with luke walker, scootiing, donning sling to LUE, stance and swing phase of LLE, wheelchair mechanics and mobility, and patient education  to improve mobility, strength and balance. Required moderate Safety awareness training;Verbal cues to promote static and dynamic balance in standing and promote coordination of bilateral, lower extremity(s). Braces/Orthotics/Lines/Etc:   · Sling to LUE  Treatment/Session Assessment:    · Response to Treatment:  See above  · Interdisciplinary Collaboration:   o Physical Therapy Assistant  o Registered Nurse  · After treatment position/precautions:   o Up in chair  o Bed/Chair-wheels locked  o Call light within reach  o RN notified   · Compliance with Program/Exercises: Compliant all of the time  · Recommendations/Intent for next treatment session: \"Next visit will focus on advancements to more challenging activities and reduction in assistance provided\".     Total Treatment Duration:  PT Patient Time In/Time Out  Time In: 1400  Time Out: 1001 [facility]

## 2020-04-07 NOTE — PROGRESS NOTES
Progress Note    2020  Admit Date: 2019  9:07 AM   NAME: Kervin Milian   :  1975   MRN:  [id number]   Attending: Marilyn Faye,*  PCP:  Danii Ramos MD  Treatment Team: Attending Provider: Jackie Castañeda MD; Care Manager: Richy Martinez, [place] [facility]; Utilization Review: Afia Tolliver RN; Nurse Practitioner: Gloria Ho NP; Care Manager: Soy Ahmadi RN; Nurse Practitioner: Linda Andrade NP; Charge Nurse: Julius De Leon Primary Nurse: Art Louis; Physical Therapy Assistant: Cristal Norton PTA; Occupational Therapist: Jade Francois OT    Full Code   SUBJECTIVE:   Mr. Patty Brown is a 54 yo male with PMH of DM, HTN who presented with c/o left sided weakness and left facial droop 19.   CT head showed age indeterminate infarctions within the left corona radiata/caudate.  CTA head/neck showed stenosis at the distal segment of the right vertebral artery and multifocal stenosis in the P2 segment of the left posterior cerebral artery. MRI brain showed acute to early subacute infarcts in the left cerebellar hemisphere, in the periventricular deep left frontoparietal white matter and likely additional areas of restricted diffusion in the right paramedian yariel.   Echo +PFO.  On statin, ASA.  Has been difficult to place in STR. Plans for 4 week event monitor on DC and if no arrhythmia PFO closure recommended. Pt remains stable today. Awaiting placement, medicaid pending. Denies complaints.     Past Medical History:   Diagnosis Date    Acute ischemic stroke ([address] 2019    Acute pancreatitis 2014    Cerebrovascular accident (CVA) due to embolism ([address] 2019    Diabetes ([facility] 2002    Diabetes ([facility]     Diabetes mellitus     Hypertension      No results found for this or any previous visit (from the past 25 hour(s)).   No Known Allergies  Current Facility-Administered Medications   Medication Dose Route Frequency Provider Last Rate Last Dose    metFORMIN (GLUCOPHAGE) tablet 500 mg  500 mg Oral DAILY WITH Lilia Quiroz MD   500 mg at 04/07/20 0856    magnesium oxide (MAG-OX) tablet 400 mg  400 mg Oral DAILY Carson Long MD   400 mg at 04/07/20 0856    acetaminophen (TYLENOL) tablet 650 mg  650 mg Oral Q4H PRN Amberly Sharif MD   650 mg at 04/04/20 1738    diphenhydrAMINE (BENADRYL) capsule 25 mg  25 mg Oral Q6H PRN Melodie Valdovinos MD   25 mg at 04/06/20 1645    acetaminophen (TYLENOL) tablet 650 mg  650 mg Oral Laurent Hurd MD   650 mg at 04/07/20 0602    lip protectant (BLISTEX) ointment 1 Each  1 Each Topical PRN Ok Miranda MD        metoprolol succinate (TOPROL-XL) XL tablet 25 mg  25 mg Oral DAILY Amberly Sharif MD   25 mg at 04/07/20 0856    polyethylene glycol (MIRALAX) packet 17 g  17 g Oral DAILY PRN Volodymyr Lux C, DO   17 g at 02/23/20 1708    guaiFENesin (ROBITUSSIN) 100 mg/5 mL oral liquid 100 mg  100 mg Oral Q4H PRN Amberly Sharif MD   100 mg at 04/06/20 1645    hydrALAZINE (APRESOLINE) 20 mg/mL injection 20 mg  20 mg IntraVENous Q4H PRN Melo Britton MD   20 mg at 12/23/19 0133    atorvastatin (LIPITOR) tablet 80 mg  80 mg Oral QHS Melo Britton MD   80 mg at 04/06/20 2225    lisinopril (PRINIVIL, ZESTRIL) tablet 40 mg  40 mg Oral DAILY Melo Britton MD   40 mg at 04/07/20 0855    sodium chloride (NS) flush 5-40 mL  5-40 mL IntraVENous PRN Melo Britton MD   10 mL at 04/07/20 0602    ondansetron (ZOFRAN) injection 4 mg  4 mg IntraVENous Q4H PRN Melo Britton MD        aspirin chewable tablet 81 mg  81 mg Oral DAILY Melo Britton MD   81 mg at 04/07/20 0855    enoxaparin (LOVENOX) injection 40 mg  40 mg SubCUTAneous Q24H Melo Britton MD   40 mg at 04/06/20 1404    dextrose 40% (GLUTOSE) oral gel 1 Tube  15 g Oral PRN Melo Britton MD        glucagon [facility]) injection 1 mg  1 mg IntraMUSCular PRN Deep Payan MD        dextrose (D50W) injection syrg 12.5-25 g  25-50 mL IntraVENous PRN Deep Payan MD           Review of Systems negative with exception of pertinent positives noted above  PHYSICAL EXAM     Visit Vitals  /89 (BP 1 Location: Right arm, BP Patient Position: Sitting)   Pulse 73   Temp 98.2 °F (36.8 °C)   Resp 18   Ht 5' 6\" (1.676 m)   Wt 95.3 kg (210 lb)   SpO2 98%   BMI 33.89 kg/m²      Temp (24hrs), Av.1 °F (36.7 °C), Min:97.7 °F (36.5 °C), Max:98.3 °F (36.8 °C)    Oxygen Therapy  O2 Sat (%): 98 % (20 1200)  Pulse via Oximetry: 75 beats per minute (20 0400)  O2 Device: Room air (20 0400)  No intake or output data in the 24 hours ending 20 1251     General:       No acute distress    Lungs:          CTA bilaterally. Resp even and nonlabored  Heart:            S1S2 present without murmurs rubs gallops. RRR. No LE edema  Abdomen:    Soft, non tender, non distended. BS present  Extremities: No cyanosis. Left sided hemiparesis  Neurologic:  moves right hand and raises arm at elbow moderately, unable to squeeze my fingers left hand, mildly slurred speech.  PERRLA, strength 4/5 RUE/RLE. Results summary of Diagnostic Studies/Procedures copied from within [facility] EMR:      Edmar Dixon 96 Problems    Diagnosis Date Noted    Hypomagnesemia 2020    PFO (patent foramen ovale) 2019    Arrhythmia 12/15/2019    Hyperlipemia     Acute [facility]olic stroke ([facility]     Type 2 diabetes mellitus ([facility]     Hypertension      Plan:    Acute embolic stroke  MRI brain showed acute to early subacute infarcts in the left cerebellar hemisphere, in the periventricular deep left frontoparietal white matter and likely additional areas of restricted diffusion in the right paramedian yariel.    +PFO on echo  On ASA, statin  Will need 4 week event monitor on DC, if no arrhythmia then will need PFO closure      Hypomagnesemia  Received IV, and on oral supplementation.      Hypertension  Continue metoprolol and ACE inhibitor  Goal BP <130/80     Type 2 diabetes mellitus:    Monitor, BGL controlled         Medically stable for DC.  Awaiting Medicaid approval.  May take months for approval.         Notes, labs, VS, diagnostic testing reviewed  Time spent with pt 20 min        DVT Prophylaxis: lovenox  Plan of Care Discussed with: Supervising MD Dr. Kvng Armijo, care team, pt        Renetta Ta, MARCELO

## 2020-04-08 LAB — GLUCOSE BLD STRIP.AUTO-MCNC: 111 MG/DL (ref 65–100)

## 2020-04-08 PROCEDURE — 74011250637 HC RX REV CODE- 250/637: Performed by: INTERNAL MEDICINE

## 2020-04-08 PROCEDURE — 65270000029 HC RM PRIVATE

## 2020-04-08 PROCEDURE — 82962 GLUCOSE BLOOD TEST: CPT

## 2020-04-08 PROCEDURE — 97530 THERAPEUTIC ACTIVITIES: CPT

## 2020-04-08 PROCEDURE — 74011250636 HC RX REV CODE- 250/636: Performed by: INTERNAL MEDICINE

## 2020-04-08 PROCEDURE — 74011250637 HC RX REV CODE- 250/637: Performed by: HOSPITALIST

## 2020-04-08 PROCEDURE — 74011250637 HC RX REV CODE- 250/637: Performed by: FAMILY MEDICINE

## 2020-04-08 RX ADMIN — ATORVASTATIN CALCIUM 80 MG: 80 TABLET, FILM COATED ORAL at 23:46

## 2020-04-08 RX ADMIN — ACETAMINOPHEN 650 MG: 325 TABLET, FILM COATED ORAL at 13:55

## 2020-04-08 RX ADMIN — LISINOPRIL 40 MG: 20 TABLET ORAL at 08:52

## 2020-04-08 RX ADMIN — DIPHENHYDRAMINE HYDROCHLORIDE 25 MG: 25 CAPSULE ORAL at 17:46

## 2020-04-08 RX ADMIN — METFORMIN HYDROCHLORIDE 500 MG: 500 TABLET ORAL at 08:52

## 2020-04-08 RX ADMIN — ASPIRIN 81 MG 81 MG: 81 TABLET ORAL at 08:52

## 2020-04-08 RX ADMIN — Medication 400 MG: at 08:52

## 2020-04-08 RX ADMIN — METOPROLOL SUCCINATE 25 MG: 25 TABLET, FILM COATED, EXTENDED RELEASE ORAL at 08:52

## 2020-04-08 RX ADMIN — GUAIFENESIN 100 MG: 100 SOLUTION ORAL at 23:46

## 2020-04-08 RX ADMIN — ACETAMINOPHEN 650 MG: 325 TABLET, FILM COATED ORAL at 23:46

## 2020-04-08 RX ADMIN — ENOXAPARIN SODIUM 40 MG: 40 INJECTION SUBCUTANEOUS at 13:55

## 2020-04-08 RX ADMIN — GUAIFENESIN 100 MG: 100 SOLUTION ORAL at 17:46

## 2020-04-08 RX ADMIN — DIPHENHYDRAMINE HYDROCHLORIDE 25 MG: 25 CAPSULE ORAL at 23:46

## 2020-04-08 RX ADMIN — ACETAMINOPHEN 650 MG: 325 TABLET, FILM COATED ORAL at 06:26

## 2020-04-08 NOTE — PROGRESS NOTES
TC from pt's spouse, Ami Jaquez. She stated that she had spoke with someone (she thought it was [facility] but wasn't;t sure) and they told her that they needed information about the pt in order to provide assistance. She stated that the pt's claim # was [id number] and the return # is [phone]. SW informed her that this does not seem to be a DECO number and that it most likely was either social security or [facility]. SW called the # and confirmed that it is the [facility] disability determination office. SW called spouse back and informed her of this. SW inquired of her if she had made any progress getting a first floor apartment. She stated that she had not and that she won't be able to take care of him. She stated that they had been having marital problems prior to his CVA and she does not want him to return home regardless of how well he is doing. SW following.

## 2020-04-08 NOTE — PROGRESS NOTES
Progress Note    2020  Admit Date: 2019  9:07 AM   NAME: Austin Lo   :  1975   MRN:  [id number]   Attending: Gisela Dent,*  PCP:  Elham Montes MD  Treatment Team: Attending Provider: Gisela Dent MD; Care Manager: Taisha Dillan, [facility] [place]; Utilization Review: Alyssa Muniz, RN; Nurse Practitioner: Syed Mccarthy NP; Care Manager: Bora Cárdenas RN; Nurse Practitioner: Arvind Presley NP; Charge Nurse: Heath Paul Primary Nurse: Fiorella Bishop; Physical Therapy Assistant: Marlene Feliciano PTA    Full Code   SUBJECTIVE:   Mr. Krys Grimaldo is a 52 yo male with PMH of DM, HTN who presented with c/o left sided weakness and left facial droop 19.   CT head showed age indeterminate infarctions within the left corona radiata/caudate.  CTA head/neck showed stenosis at the distal segment of the right vertebral artery and multifocal stenosis in the P2 segment of the left posterior cerebral artery. MRI brain showed acute to early subacute infarcts in the left cerebellar hemisphere, in the periventricular deep left frontoparietal white matter and likely additional areas of restricted diffusion in the right paramedian yariel.   Echo +PFO.  On statin, ASA.  Has been difficult to place in STR. Plans for 4 week event monitor on DC and if no arrhythmia PFO closure recommended. Pt remains stable today. Awaiting placement, medicaid pending.  Denies complaints.     Past Medical History:   Diagnosis Date    Acute ischemic stroke ([address] 2019    Acute pancreatitis 2014    Cerebrovascular accident (CVA) due to embolism ([address] 2019    Diabetes ([address] 2002    [facility]     Diabetes mellitus     Hypertension      Recent Results (from the past 24 hour(s))   GLUCOSE, POC    Collection Time: 20  5:52 AM   Result Value Ref Range    Glucose (POC) 111 (H) 65 - 100 mg/dL     No Known Allergies  Current Facility-Administered Medications   Medication Dose Route Frequency Provider Last Rate Last Dose    metFORMIN (GLUCOPHAGE) tablet 500 mg  500 mg Oral DAILY WITH Donna Beltran MD   500 mg at 04/08/20 3481    magnesium oxide (MAG-OX) tablet 400 mg  400 mg Oral DAILY Roro Lan MD   400 mg at 04/08/20 8331    acetaminophen (TYLENOL) tablet 650 mg  650 mg Oral Q4H PRN Sherine Cortés MD   650 mg at 04/04/20 1738    diphenhydrAMINE (BENADRYL) capsule 25 mg  25 mg Oral Q6H PRN Marshal Prieto MD   25 mg at 04/07/20 1801    acetaminophen (TYLENOL) tablet 650 mg  650 mg Oral Mohit Talley MD   650 mg at 04/08/20 1135    lip protectant (BLISTEX) ointment 1 Each  1 Each Topical PRN Stuart Shoemaker MD        metoprolol succinate (TOPROL-XL) XL tablet 25 mg  25 mg Oral DAILY Sherine Cortés MD   25 mg at 04/08/20 5065    polyethylene glycol (MIRALAX) packet 17 g  17 g Oral DAILY PRN Laurie Solo C, DO   17 g at 02/23/20 1708    guaiFENesin (ROBITUSSIN) 100 mg/5 mL oral liquid 100 mg  100 mg Oral Q4H PRN Sherine Cortés MD   100 mg at 04/07/20 1801    hydrALAZINE (APRESOLINE) 20 mg/mL injection 20 mg  20 mg IntraVENous Q4H PRN Ying Mckinney MD   20 mg at 12/23/19 0133    atorvastatin (LIPITOR) tablet 80 mg  80 mg Oral QHS Ying Mckinney MD   80 mg at 04/07/20 2216    lisinopril (PRINIVIL, ZESTRIL) tablet 40 mg  40 mg Oral DAILY Ying Mckinney MD   40 mg at 04/08/20 4604    sodium chloride (NS) flush 5-40 mL  5-40 mL IntraVENous PRN Ying Mckinney MD   10 mL at 04/07/20 0602    ondansetron (ZOFRAN) injection 4 mg  4 mg IntraVENous Q4H PRN Ying Mckinney MD        aspirin chewable tablet 81 mg  81 mg Oral DAILY Ying Mckinney MD   81 mg at 04/08/20 9002    enoxaparin (LOVENOX) injection 40 mg  40 mg SubCUTAneous Q24H Ying Mckinney MD   40 mg at 04/07/20 1447    dextrose 40% (GLUTOSE) oral gel 1 Tube  15 g Oral PRN Ying Mckinney MD        glucagon [facility]) injection 1 mg  1 mg IntraMUSCular PRN Artie Sow MD        dextrose (D50W) injection syrg 12.5-25 g  25-50 mL IntraVENous PRN Artie Sow MD           Review of Systems negative with exception of pertinent positives noted above  PHYSICAL EXAM     Visit Vitals  /78 (BP 1 Location: Right arm, BP Patient Position: At rest)   Pulse 71   Temp 97.9 °F (36.6 °C)   Resp 17   Ht 5' 6\" (1.676 m)   Wt 95.3 kg (210 lb)   SpO2 98%   BMI 33.89 kg/m²      Temp (24hrs), Av °F (36.7 °C), Min:97.7 °F (36.5 °C), Max:98.3 °F (36.8 °C)    Oxygen Therapy  O2 Sat (%): 98 % (20 1200)  Pulse via Oximetry: 75 beats per minute (20 0400)  O2 Device: Room air (20 0400)  No intake or output data in the 24 hours ending 20 1321     General:       No acute distress    Lungs:          CTA bilaterally. Resp even and nonlabored  Heart:            S1S2 present without murmurs rubs gallops. RRR. No LE edema  Abdomen:    Soft, non tender, non distended. BS present  Extremities: No cyanosis. Left sided hemiparesis  Neurologic:  moves right hand and raises arm at elbow moderately, unable to squeeze my fingers left hand, mildly slurred speech.  PERRLA, strength 4/5 RUE/RLE. Results summary of Diagnostic Studies/Procedures copied from within [facility] EMR:        Edmar Dixon 96 Problems    Diagnosis Date Noted    Hypomagnesemia 2020    PFO (patent foramen ovale) 2019    Arrhythmia 12/15/2019    Hyperlipemia     [facility]     Type 2 diabetes mellitus ([facility]     Hypertension      Plan:    Acute embolic stroke  MRI brain showed acute to early subacute infarcts in the left cerebellar hemisphere, in the periventricular deep left frontoparietal white matter and likely additional areas of restricted diffusion in the right paramedian yariel.    +PFO on echo  On ASA, statin  Will need 4 week event monitor on DC, if no arrhythmia then will need PFO closure      Hypomagnesemia  Received IV, and on oral supplementation.      Hypertension  Continue metoprolol and ACE inhibitor  Goal BP <130/80     Type 2 diabetes mellitus:    Monitor, BGL controlled         Medically stable for DC.  Awaiting Medicaid approval.  May take months for approval.         Notes, labs, VS, diagnostic testing reviewed  Time spent with pt 20 min        DVT Prophylaxis: lovenox  Plan of Care Discussed with: Supervising MD Dr. Octavio Deshpande, care team, pt           Tanika Lockwood, NP

## 2020-04-08 NOTE — PROGRESS NOTES
Problem: Patient Education: Go to Patient Education Activity  Goal: Patient/Family Education  Outcome: Progressing Towards Goal     Problem: Pressure Injury - Risk of  Goal: *Prevention of pressure injury  Description: Document Dewey Scale and appropriate interventions in the flowsheet. Outcome: Progressing Towards Goal  Note: Pressure Injury Interventions:  Sensory Interventions: Assess changes in LOC, Float heels, Keep linens dry and wrinkle-free, Maintain/enhance activity level    Moisture Interventions: Absorbent underpads, Apply protective barrier, creams and emollients, Check for incontinence Q2 hours and as needed    Activity Interventions: Increase time out of bed    Mobility Interventions: HOB 30 degrees or less    Nutrition Interventions: Offer support with meals,snacks and hydration, Document food/fluid/supplement intake    Friction and Shear Interventions: Lift sheet                Problem: Patient Education: Go to Patient Education Activity  Goal: Patient/Family Education  Outcome: Progressing Towards Goal     Problem: Falls - Risk of  Goal: *Absence of Falls  Description: Document Jeanne Fall Risk and appropriate interventions in the flowsheet. Outcome: Progressing Towards Goal  Note: Fall Risk Interventions:  Mobility Interventions: Bed/chair exit alarm    Mentation Interventions: Bed/chair exit alarm    Medication Interventions: Bed/chair exit alarm    Elimination Interventions: Call light in reach, Bed/chair exit alarm, Patient to call for help with toileting needs    History of Falls Interventions: Bed/chair exit alarm, Door open when patient unattended         Problem: Patient Education: Go to Patient Education Activity  Goal: Patient/Family Education  Outcome: Progressing Towards Goal     Problem: Diabetes Self-Management  Goal: *Disease process and treatment process  Description: Define diabetes and identify own type of diabetes; list 3 options for treating diabetes.   Outcome: Progressing Towards Goal  Goal: *Incorporating nutritional management into lifestyle  Description: Describe effect of type, amount and timing of food on blood glucose; list 3 methods for planning meals. Outcome: Progressing Towards Goal  Goal: *Incorporating physical activity into lifestyle  Description: State effect of exercise on blood glucose levels. Outcome: Progressing Towards Goal  Goal: *Developing strategies to promote health/change behavior  Description: Define the ABC's of diabetes; identify appropriate screenings, schedule and personal plan for screenings. Outcome: Progressing Towards Goal  Goal: *Using medications safely  Description: State effect of diabetes medications on diabetes; name diabetes medication taking, action and side effects. Outcome: Progressing Towards Goal  Goal: *Monitoring blood glucose, interpreting and using results  Description: Identify recommended blood glucose targets  and personal targets. Outcome: Progressing Towards Goal  Goal: *Prevention, detection, treatment of acute complications  Description: List symptoms of hyper- and hypoglycemia; describe how to treat low blood sugar and actions for lowering  high blood glucose level. Outcome: Progressing Towards Goal  Goal: *Prevention, detection and treatment of chronic complications  Description: Define the natural course of diabetes and describe the relationship of blood glucose levels to long term complications of diabetes.   Outcome: Progressing Towards Goal  Goal: *Developing strategies to address psychosocial issues  Description: Describe feelings about living with diabetes; identify support needed and support network  Outcome: Progressing Towards Goal     Problem: Patient Education: Go to Patient Education Activity  Goal: Patient/Family Education  Outcome: Progressing Towards Goal     Problem: Patient Education: Go to Patient Education Activity  Goal: Patient/Family Education  Outcome: Progressing Towards Goal     Problem: Nutrition Deficit  Goal: *Optimize nutritional status  Outcome: Progressing Towards Goal     Problem: Patient Education: Go to Patient Education Activity  Goal: Patient/Family Education  Outcome: Progressing Towards Goal     Problem: General Medical Care Plan  Goal: *Vital signs within specified parameters  Outcome: Progressing Towards Goal  Goal: *Labs within defined limits  Outcome: Progressing Towards Goal  Goal: *Absence of infection signs and symptoms  Description: Wash hand more often   Outcome: Progressing Towards Goal  Goal: *Optimal pain control at patient's stated goal  Outcome: Progressing Towards Goal  Goal: *Skin integrity maintained  Outcome: Progressing Towards Goal  Goal: *Fluid volume balance  Outcome: Progressing Towards Goal  Goal: *Optimize nutritional status  Outcome: Progressing Towards Goal  Goal: *Anxiety reduced or absent  Outcome: Progressing Towards Goal  Goal: *Progressive mobility and function (eg: ADL's)  Outcome: Progressing Towards Goal     Problem: Patient Education: Go to Patient Education Activity  Goal: Patient/Family Education  Outcome: Progressing Towards Goal     Problem: Patient Education: Go to Patient Education Activity  Goal: Patient/Family Education  Outcome: Progressing Towards Goal     Problem: Patient Education: Go to Patient Education Activity  Goal: Patient/Family Education  Outcome: Progressing Towards Goal     Problem: Patient Education: Go to Patient Education Activity  Goal: Patient/Family Education  Outcome: Progressing Towards Goal     Problem: Pain  Goal: *Control of Pain  Outcome: Progressing Towards Goal     Problem: Patient Education: Go to Patient Education Activity  Goal: Patient/Family Education  Outcome: Progressing Towards Goal     Problem: Patient Education: Go to Patient Education Activity  Goal: Patient/Family Education  Outcome: Progressing Towards Goal

## 2020-04-08 NOTE — PROGRESS NOTES
Problem: Mobility Impaired (Adult and Pediatric)  Goal: *Acute Goals and Plan of Care  Description  GOALS UPDATED ON RE-ASSESSMENT 4/1/2020 (advancements to goals in bold):  1. Patient will perform bed mobility with supervision and 0 verbal cues within 7 treatment days. 2. Patient will perform transfer bed to chair with SUPERVISION within 7 treatment days. 3. Patient will demonstrate fair+ dynamic balance throughout stance phase on L LE within 7 treatment days. 4. Patient will require STAND BY ASSIST throughout swing phase on (to advance) L LE within 7 treatment days. 5. Patient demonstrate 3+/5 strength in L LE hip flexion, hip abduction, and hip adduction within 7 treatment days. 6. Patient will ambulate 200ft+ with STAND BY ASSIST and least retrictive assistive device within 7 treatment days. 7. Patient will perform wheelchair mobility x75ft with MODIFIED INDEPENDENCE within 7 treatment days. 8. Mr. Kathleen Jensen will be independent with wheelchair locking/unlocking mechanism as well as leg rest manipulation within 7 days to improve safety and independence. 9. Mr. Kathleen Jensen will don/doff LUE sling for protection of L shoulder during transfers/gait within 7 treatment days. PHYSICAL THERAPY: Daily Note and PM 4/8/2020  INPATIENT: PT Visit Days : 6  Payor: MEDICAID PENDING / Plan: Elina Frey PENDING / Product Type: Medicaid /       NAME/AGE/GENDER: Pancho Chin is a 53 y.o. male   PRIMARY DIAGNOSIS: Acute ischemic stroke ([address] [I63.9]  Cerebrovascular accident (CVA) due to embolism ([address] [A97.5] Acute embolic stroke ([address] Acute embolic stroke ([address]       ICD-10: Treatment Diagnosis:   · Difficulty in walking, Not elsewhere classified (R26.2)  · Hemiplegia and hemiparesis following cerebral infarction affecting left non-dominant side (N73.125)   Precaution/Allergies:  Patient has no known allergies. ASSESSMENT:     Mr. Kathleen Jensen is a 53year old admitted R MCA CVA.   Patient was supine upon contact and agreeable to PT. Patient performs supine to sit with supervision and additional time. Patient attempted to don sling on LUE on his own power which he did pretty good requiring min assist and cues for technique. Patient transfers to standing with CGA-SBA. Patient then ambulates 350' with luke walker, and focus on positioning of L foot during strike (heel first) and stance phase on L to improve external rotation. Patient needed cues periodically throughout gait to improve this and have proper gait sequencing. Wheelchair in tow for safety. Gait mechanics decline as patient fatigues requiring increased cues. Patient then performs wheelchair mobility x 150' with several rest breaks and cues for improved/proper technique. Patient returns to his room where he was left sitting up in wheelchair. Overall good progress towards physical therapy goals. Goals listed above are still appropriate. Will continue efforts as patient is still below functional baseline. This section established at most recent assessment   PROBLEM LIST (Impairments causing functional limitations):  1. Decreased Strength  2. Decreased ADL/Functional Activities  3. Decreased Transfer Abilities  4. Decreased Ambulation Ability/Technique  5. Decreased Balance  6. Increased Pain  7. Decreased Activity Tolerance  8. Increased Fatigue  9. Decreased Flexibility/Joint Mobility   INTERVENTIONS PLANNED: (Benefits and precautions of physical therapy have been discussed with the patient.)  1. Balance Exercise  2. Bed Mobility  3. Family Education  4. Gait Training  5. Home Exercise Program (HEP)  6. Manual Therapy  7. Neuromuscular Re-education/Strengthening  8. Range of Motion (ROM)  9. Therapeutic Activites  10. Therapeutic Exercise/Strengthening  11.  Transfer Training     TREATMENT PLAN: Frequency/Duration: 3 times a week for duration of hospital stay  Rehabilitation Potential For Stated Goals: Good     REHAB RECOMMENDATIONS (at time of discharge pending progress):    Placement: It is my opinion, based on this patient's performance to date, that Mr. Elvin Farooq may benefit from intensive therapy at an [facility] after discharge due to a probable need for close medical supervision by a rehab physician, a probable need for multiple therapy disciplines and potential to make ongoing and sustainable functional improvement that is of practical value. .  Equipment:    TBD pending progress with therapy. HISTORY:   History of Present Injury/Illness (Reason for Referral):  Per H&P: \"Pt is a 54 y/o smoker with DM, HTN, who presented to ER with L leg and arm weakness, L facial droop, dysarthria. First noted L leg weakness late night 12/11 when he woke up to go to the bathroom. He was normal when he went to bed around 1030. Woke up this morning and had persistent weakness L leg and also now noted in L arm. EMS called. Noted to have slurred speech and L facial droop as well. Code S called in ER around 9am.  MRI with acute infarcts in L cerebellar hemisphere, deep frontoparietal white matter, and R paramedian yariel. Also noted old lacunar infarcts. No large vessel occlusion on CTA, but some stenosis noted. No hx afib, TIA, CVA. No CP, palpitations, SOB. \"  Past Medical History/Comorbidities:   Mr. Elvin Farooq  has a past medical history of Acute ischemic stroke ([address] (12/12/2019), Acute pancreatitis (11/19/2014), Cerebrovascular accident (CVA) due to embolism ([address] (12/12/2019), Diabetes ([address] (2002), Diabetes ([address] Diabetes mellitus, and Hypertension. He also has no past medical history of Arthritis, Asthma, Autoimmune disease ([address] CAD (coronary artery disease), Cancer ([address] Chronic kidney disease, COPD, Dementia, Dementia ([address] Heart failure ([address] Ill-defined condition, Infectious disease, Liver disease, Other ill-defined conditions(799.89), Psychiatric disorder, PUD (peptic ulcer disease), Seizures ([address] or Sleep disorder.    Leonid Baumann  has a past surgical history that includes hx hernia repair and hx orthopaedic. Social History/Living Environment:   Home Environment: Apartment  # Steps to Enter: 12  Rails to Enter: Yes  [employer] : Bilateral  One/Two Story Residence: One story  Living Alone: No  Support Systems: Spouse/Significant Other/Partner  Patient Expects to be Discharged to[de-identified] Unknown  Current DME Used/Available at Home: None  Tub or Shower Type: Tub/Shower combination  Prior Level of Function/Work/Activity:  Independent, lives with wife in 2nd story 1 level apartment. No recent falls. Number of Personal Factors/Comorbidities that affect the Plan of Care: 1-2: MODERATE COMPLEXITY   EXAMINATION:   Most Recent Physical Functioning:   Gross Assessment:                  Posture:     Balance:  Sitting: Intact  Standing: Impaired  Standing - Static: Good  Standing - Dynamic : Fair Bed Mobility:  Supine to Sit: Supervision  Wheelchair Mobility:     Transfers:  Sit to Stand: Stand-by assistance  Stand to Sit: Stand-by assistance  Gait:     Base of Support: Narrowed; Center of gravity altered  Speed/Clotilde: Slow  Step Length: Left shortened;Right shortened  Gait Abnormalities: Decreased step clearance;Trunk sway increased; Step to gait  Distance (ft): 350 Feet (ft)  Assistive Device: Walker luke  Ambulation - Level of Assistance: Contact guard assistance  Interventions: Safety awareness training;Verbal cues; Tactile cues      Body Structures Involved:  1. Nerves  2. Voice/Speech  3. Bones  4. Joints  5. Muscles Body Functions Affected:  1. Mental  2. Sensory/Pain  3. Neuromusculoskeletal  4. Movement Related Activities and Participation Affected:  1. General Tasks and Demands  2. Mobility  3. Self Care  4.  Interpersonal Interactions and Relationships   Number of elements that affect the Plan of Care: 4+: HIGH COMPLEXITY   CLINICAL PRESENTATION:   Presentation: Evolving clinical presentation with changing clinical characteristics: MODERATE COMPLEXITY   CLINICAL DECISION MAKIN[address] [place] [address] AM-PAC 6 Clicks   Basic Mobility Inpatient Short Form  How much difficulty does the patient currently have. .. Unable A Lot A Little None   1. Turning over in bed (including adjusting bedclothes, sheets and blankets)? [] 1   [] 2   [] 3   [x] 4   2. Sitting down on and standing up from a chair with arms ( e.g., wheelchair, bedside commode, etc.)   [] 1   [] 2   [x] 3   [] 4   3. Moving from lying on back to sitting on the side of the bed? [] 1   [] 2   [x] 3   [] 4   How much help from another person does the patient currently need. .. Total A Lot A Little None   4. Moving to and from a bed to a chair (including a wheelchair)? [] 1   [] 2   [x] 3   [] 4   5. Need to walk in hospital room? [] 1   [] 2   [x] 3   [] 4   6. Climbing 3-5 steps with a railing? [] 1   [x] 2   [] 3   [] 4   © , Trustees of [address] [place] [address], under license to [employer]. All rights reserved      Score:  Initial: 13 Most Recent: 18 (Date:3/9/2020)    Interpretation of Tool:  Represents activities that are increasingly more difficult (i.e. Bed mobility, Transfers, Gait). Medical Necessity:     · Patient is expected to demonstrate progress in   · strength, range of motion, balance, coordination, and functional technique  ·  to   · increase independence with all mobility. · .  Reason for Services/Other Comments:  · Patient continues to require skilled intervention due to   · medical complications and mobility deficits which impact his level of function, safety, and independence as indicated above. · .   Use of outcome tool(s) and clinical judgement create a POC that gives a: Questionable prediction of patient's progress: MODERATE COMPLEXITY        TREATMENT:      Pre-treatment Symptoms/Complaints:  none  Pain: Initial: 0/10 Post Session:  0/10     Therapeutic Activity: (    25 Minutes):   Therapeutic activities including bed mobility training, transfer training, ambulation on level ground, instruction in sequencing with luke walker, scootiing, donning sling to LUE, stance and swing phase of LLE, wheelchair mechanics and mobility, and patient education  to improve mobility, strength and balance. Required moderate Safety awareness training;Verbal cues; Tactile cues to promote static and dynamic balance in standing and promote coordination of bilateral, lower extremity(s). Braces/Orthotics/Lines/Etc:   · Sling to LUE  Treatment/Session Assessment:    · Response to Treatment:  See above  · Interdisciplinary Collaboration:   o Physical Therapy Assistant  o Registered Nurse  · After treatment position/precautions:   o Up in chair  o Bed/Chair-wheels locked  o Call light within reach  o RN notified   · Compliance with Program/Exercises: Compliant all of the time  · Recommendations/Intent for next treatment session: \"Next visit will focus on advancements to more challenging activities and reduction in assistance provided\".     Total Treatment Duration:  PT Patient Time In/Time Out  Time In: 1345  Time Out: [facility]

## 2020-04-09 LAB — GLUCOSE BLD STRIP.AUTO-MCNC: 108 MG/DL (ref 65–100)

## 2020-04-09 PROCEDURE — 74011250637 HC RX REV CODE- 250/637: Performed by: HOSPITALIST

## 2020-04-09 PROCEDURE — 74011250636 HC RX REV CODE- 250/636: Performed by: INTERNAL MEDICINE

## 2020-04-09 PROCEDURE — 74011250637 HC RX REV CODE- 250/637: Performed by: INTERNAL MEDICINE

## 2020-04-09 PROCEDURE — 65270000029 HC RM PRIVATE

## 2020-04-09 PROCEDURE — 82962 GLUCOSE BLOOD TEST: CPT

## 2020-04-09 PROCEDURE — 97530 THERAPEUTIC ACTIVITIES: CPT

## 2020-04-09 PROCEDURE — 92507 TX SP LANG VOICE COMM INDIV: CPT

## 2020-04-09 PROCEDURE — 74011250637 HC RX REV CODE- 250/637: Performed by: FAMILY MEDICINE

## 2020-04-09 RX ADMIN — Medication 400 MG: at 08:54

## 2020-04-09 RX ADMIN — ENOXAPARIN SODIUM 40 MG: 40 INJECTION SUBCUTANEOUS at 14:03

## 2020-04-09 RX ADMIN — DIPHENHYDRAMINE HYDROCHLORIDE 25 MG: 25 CAPSULE ORAL at 17:05

## 2020-04-09 RX ADMIN — ACETAMINOPHEN 650 MG: 325 TABLET, FILM COATED ORAL at 21:27

## 2020-04-09 RX ADMIN — ACETAMINOPHEN 650 MG: 325 TABLET, FILM COATED ORAL at 05:49

## 2020-04-09 RX ADMIN — METFORMIN HYDROCHLORIDE 500 MG: 500 TABLET ORAL at 08:53

## 2020-04-09 RX ADMIN — ACETAMINOPHEN 650 MG: 325 TABLET, FILM COATED ORAL at 14:03

## 2020-04-09 RX ADMIN — ASPIRIN 81 MG 81 MG: 81 TABLET ORAL at 08:53

## 2020-04-09 RX ADMIN — ATORVASTATIN CALCIUM 80 MG: 80 TABLET, FILM COATED ORAL at 21:27

## 2020-04-09 RX ADMIN — LISINOPRIL 40 MG: 20 TABLET ORAL at 08:53

## 2020-04-09 RX ADMIN — METOPROLOL SUCCINATE 25 MG: 25 TABLET, FILM COATED, EXTENDED RELEASE ORAL at 08:54

## 2020-04-09 RX ADMIN — GUAIFENESIN 100 MG: 100 SOLUTION ORAL at 17:05

## 2020-04-09 NOTE — PROGRESS NOTES
BALTA from Karla Beckham of the [facility] Disability Determination office (#[phone]). She stated that she has been requesting medical records on this patient since at least the beginning of March. She stated that she was told by the [facility] medical records dept that they can not provide her with records since the pt is still inpatient in the hospital.  She stated that she has reached out to [facility] numerous times via phone and email, requesting help to obtain the records so that she can allow the claim. Until the pt is approved for disability his medicaid application will remain pending. SW has reached out to [facility] and left voice mail message requesting a return call.    supervisors and director have been notified and will assist.

## 2020-04-09 NOTE — PROGRESS NOTES
Problem: Self Care Deficits Care Plan (Adult)  Goal: *Acute Goals and Plan of Care (Insert Text)  Description  Goals per Re-evaluation on 3/18/2020:   1. Patient will demonstrate appropriate safety awareness and protection of L UE during bed mobility and functional transfers with minimal cues. (Progressing, still needs cues, 3/18/2020)  2. Patient will complete total body bathing and dressing with Min A and adaptive equipment as needed. (Progressing, UB bathing Min A, UB dressing at [address] [place] [address] , LB dressing at [facility], 3/18/2020)  3. Patient will complete weightbearing into the L UE with ADL tasks with minimal assistance to improve ability to use as a functional assist during ADL tasks. (Progressing, 3/18/2020)4. Patient will demonstrate L UE SROM HEP within 7 days. (Progressing, 3/18/2020)  5. Pt will complete bed mobility with Mod I and minimal verbal cueing (Progressing, Supervision, 3/25/2020). 6. Pt will complete dynamic sitting balance for ADLs at mod I with good balance (Progressing, 3/18/2020)  7. Pt will complete functional transfers with Supervision with equipment as needed. (Progressing, SBA 3/18/2020)  8. Pt will complete grooming tasks in standing at sink level x5 mins with good balance and equipment as needed (Progressing, CGA with fair balance 3/25/2020)  9. Pt will complete grooming standing at sink level with SBA and use of adaptive equipment as needed. 10. Pt will don/doff UE sling with SBA and use of minimal verbal and visual cueing for correct application (Progressing, Min A 3/25/2020). Goals per Re-evaluation on 3/27/2020:   1. Patient will demonstrate appropriate safety awareness and protection of L UE during bed mobility and functional transfers with no verbal cues. 2. Patient will complete lower body bathing and dressing with Min A and adaptive equipment as needed. 3. Patient will complete upper body bathing and dressing with SBA and adaptive equipment as needed.   4. Patient will complete weightbearing into the L UE with ADL tasks with minimal assistance to improve ability to use as a functional assist during ADL tasks. 5. Patient will demonstrate L UE SROM HEP within 7 days. 6. Pt will complete bed mobility with Mod I and no verbal cueing. 7. Pt will complete functional transfers with Supervision with equipment as needed. 8. Pt will don/doff UE sling with Mod I and no verbal cueing. 9. Pt will complete toileting with SBA for toilet transfer and gown management. Outcome: Progressing Towards Goal     OCCUPATIONAL THERAPY: Daily Note and PM    4/9/2020  INPATIENT: OT Visit Days: 6  Payor: MEDICAID PENDING / Plan: Garrick Yoon PENDING / Product Type: Medicaid /      NAME/AGE/GENDER: Priti Kelly is a 53 y.o. male   PRIMARY DIAGNOSIS:  Acute ischemic stroke ([facility] [address] [I63.9]  Cerebrovascular accident (CVA) due to embolism ([address] [Y69.9] Acute embolic stroke ([address] Acute embolic stroke ([facility] [address]       ICD-10: Treatment Diagnosis:    · Generalized Muscle Weakness (M62.81)  · Other lack of cordination (R27.8)  · Hemiplegia and hemiparesis following cerebral infarction affecting   · left non-dominant side (I69.354)  · Abnormal posture (R29.3)   Precautions/Allergies:    NO PULLING ON LUE   LUE in sling with shoulder joint approximated and supported, needs taping   Patient has no known allergies. ASSESSMENT:     Mr. Jackie Anderson presents to the hospital with L sided weakness and acute ischemic CVA. MRI revealed acute to subacute L cerebellar and R paramedian yariel infarcts. 4/9/2020: Pt supine in the bed upon arrival with lights out. Pt agreeable to OT treatment. Pt verbalizes that he would like to work on functional mobility today. Pt has been using a luke-walker and a sling to the LUE but today attempted using a rolling walker since patient tends to heavily rely on his R side with mobility. Pt was able to stand to the walker with CGA/min A.  Pt did need assistance to position the R UE on the walker but was able to maintain  on the walker with occasional tactile cues. Pt completes use of walker today to initiate more weightbearing through L side (LE and UE). Pt did fairly well with the walker needing CGA and occasional cues to stop and align walker forwards. Pt still leaning heavier onto the R side of the walker but it did appear he did bear more weight through L side with use of walker. Pt still having hyperextension at the L knee but L foot appeared to be less externally rotated with walker. Pt needed assistance for removal of his hand from the walker when he went for stand to sitting in the wheelchair. Pt completed wheelchair mobility the rest of the way with good navigation with his R UE and using B LEs to assist. Pt needed occasional cues to slow down to decrease risk of L LE getting hung underneath the wheelchair. Pt able to lock wheels on both sides and agreeable to sitting in the [facility] for dinner. Multiple pillows placed under the L UE for support. Pt verbalizes the need to call for assistance to get back to bed and discussed it with nursing at end of session. Pt to continue per plan of care. This section established at most recent assessment   PROBLEM LIST (Impairments causing functional limitations):  1. Decreased Strength  2. Decreased ADL/Functional Activities  3. Decreased Transfer Abilities  4. Decreased Ambulation Ability/Technique  5. Decreased Balance  6. Decreased Activity Tolerance  7. Increased Fatigue  8. Decreased Flexibility/Joint Mobility  9. Decreased Knowledge of Precautions  10. Decreased Delta with Home Exercise Program   INTERVENTIONS PLANNED: (Benefits and precautions of occupational therapy have been discussed with the patient.)  1. Activities of daily living training  2. Adaptive equipment training  3. Balance training  4. Clothing management  5. Cognitive training  6. Donning&doffing training  7. Keanu tech training  8. Neuromuscular re-eduation  9.  Therapeutic activity  10. Therapeutic exercise     TREATMENT PLAN: Frequency/Duration: Follow patient 3 times per week to address above goals. Rehabilitation Potential For Stated Goals: Excellent     REHAB RECOMMENDATIONS (at time of discharge pending progress):    Placement: It is my opinion, based on this patient's performance to date, that Mr. Fernando Bell may benefit from intensive therapy at an [address] [place] [address] after discharge due to potential to make ongoing and sustainable functional improvement that is of practical value. Colin Menchaca Pt functioning far below independent baseline, demonstrating good improvement and participation. Pt would likely benefit greatly and increase independence from inpatient rehab stay. Equipment:    TBD               OCCUPATIONAL PROFILE AND HISTORY:   History of Present Injury/Illness (Reason for Referral):  See H&P  Past Medical History/Comorbidities:   Mr. Fernando Bell  has a past medical history of Acute ischemic stroke ([address] (12/12/2019), Acute pancreatitis (11/19/2014), Cerebrovascular accident (CVA) due to embolism ([address] (12/12/2019), Diabetes ([address] (2002), Diabetes ([address] Diabetes mellitus, and Hypertension. He also has no past medical history of Arthritis, Asthma, Autoimmune disease ([address] CAD (coronary artery disease), Cancer ([address] Chronic kidney disease, COPD, Dementia, Dementia ([address] Heart failure ([address] Ill-defined condition, Infectious disease, Liver disease, Other ill-defined conditions(799.89), Psychiatric disorder, PUD (peptic ulcer disease), Seizures ([address] or Sleep disorder. Mr. Fernando Bell  has a past surgical history that includes hx hernia repair and hx orthopaedic.   Social History/Living Environment:   Home Environment: Apartment  # Steps to Enter: 12  Rails to Enter: Yes  [employer] : Bilateral  One/Two Story Residence: One story  Living Alone: No  Support Systems: Spouse/Significant Other/Partner  Patient Expects to be Discharged to[de-identified] Unknown  Current DME Used/Available at Home: None  Tub or Shower Type: Tub/Shower combination  Prior Level of Function/Work/Activity:  Pt lives at home with his wife. Pt is typically independent with ADL/functional mobility. Pt does not drive. Pt was working part-time at [employer]. Personal Factors:          Age:  53 y.o. Past/Current Experience:  CVA with flaccid L side        Other factors that influence how disability is experienced by the patient:  multiple co-morbidities    Number of Personal Factors/Comorbidities that affect the Plan of Care: Extensive review of physical, cognitive, and psychosocial performance (3+):  HIGH COMPLEXITY   ASSESSMENT OF OCCUPATIONAL PERFORMANCE[de-identified]   Activities of Daily Living:   Basic ADLs (From Assessment) Complex ADLs (From Assessment)   Feeding: Setup  Oral Facial Hygiene/Grooming: Minimum assistance  Bathing: Moderate assistance  Upper Body Dressing: Minimum assistance  Lower Body Dressing: Maximum assistance  Toileting: Moderate assistance Instrumental ADL  Meal Preparation: Total assistance  Homemaking: Total assistance  Medication Management: Total assistance  Financial Management: Total assistance   Grooming/Bathing/Dressing Activities of Daily Living     Cognitive Retraining  Safety/Judgement: Fall prevention   Upper Body Bathing  Bathing Assistance: Min A   Position Performed: Seated in chair                  Lower Body Dressing Assistance  Socks:  Total assistance (dependent) Bed/Mat Mobility  Supine to Sit: Minimum assistance  Sit to Stand: Contact guard assistance  Stand to Sit: Contact guard assistance  Bed to Chair: Contact guard assistance  Scooting: Minimum assistance     Most Recent Physical Functioning:   Gross Assessment:  AROM: (R UE WFL; L UE non-functional AROM)  PROM: Generally decreased, functional(L UE)  Strength: (R UE WFL; L UE non-functional; flaccid)  Coordination: (non-functional in the L UE)  Tone: Abnormal(hypotonic in L UE)  Sensation: Intact(reports intact grossly to light touch)               Posture:     Balance:  Sitting: Impaired  Sitting - Static: Good (unsupported)  Sitting - Dynamic: Fair (occasional)  Standing: Impaired  Standing - Static: Good  Standing - Dynamic : Fair Bed Mobility:  Supine to Sit: Minimum assistance  Scooting: Minimum assistance  Wheelchair Mobility:     Transfers:  Sit to Stand: Contact guard assistance  Stand to Sit: Contact guard assistance  Bed to Chair: Contact guard assistance            Patient Vitals for the past 6 hrs:   BP BP Patient Position SpO2 Pulse   20 1200 (!) 150/97 At rest 99 % 68   20 1600 129/86 At rest 97 % 71       Mental Status  Neurologic State: Alert  Orientation Level: Oriented X4  Cognition: Follows commands  Perception: Cues to attend to left side of body, Cues to maintain midline in sitting, Cues to maintain midline in standing  Perseveration: No perseveration noted  Safety/Judgement: Fall prevention                          Physical Skills Involved:  1. Range of Motion  2. Balance  3. Strength  4. Activity Tolerance  5. Fine Motor Control  6. Gross Motor Control Cognitive Skills Affected (resulting in the inability to perform in a timely and safe manner):  1. Perception  2. Expression Psychosocial Skills Affected:  1. Habits/Routines  2. Environmental Adaptation  3. Social Interaction  4. Emotional Regulation  5. Self-Awareness  6. Awareness of Others  7. Social Roles   Number of elements that affect the Plan of Care: 5+:  HIGH COMPLEXITY   CLINICAL DECISION MAKIN [facility] [address] [id number] AM-PAC 6 Clicks   Daily Activity Inpatient Short Form  How much help from another person does the patient currently need. .. Total A Lot A Little None   1. Putting on and taking off regular lower body clothing? [] 1   [x] 2   [] 3   [] 4   2. Bathing (including washing, rinsing, drying)? [] 1   [] 2   [x] 3   [] 4   3.   Toileting, which includes using toilet, bedpan or urinal?   [] 1   [] 2   [x] 3   [] 4 4.  Putting on and taking off regular upper body clothing? [] 1   [] 2   [x] 3   [] 4   5. Taking care of personal grooming such as brushing teeth? [] 1   [] 2   [x] 3   [] 4   6. Eating meals? [] 1   [] 2   [x] 3   [] 4   © 2007, Trustees of [address] [place] [address] [address] [id number], under license to [employer]. All rights reserved      Score:  Initial: 11 Most Recent: 15 (Date: 3/18/2020 ); 17 (3/27/2020)    Interpretation of Tool:  Represents activities that are increasingly more difficult (i.e. Bed mobility, Transfers, Gait). Medical Necessity:     · Patient demonstrates   · good and excellent  ·  rehab potential due to higher previous functional level. Reason for Services/Other Comments:  · Patient continues to require skilled intervention due to   · Decreased independence with ADL/functional transfers that impacts overall quality of life. · .   Use of outcome tool(s) and clinical judgement create a POC that gives a: MODERATE COMPLEXITY         TREATMENT:   (In addition to Assessment/Re-Assessment sessions the following treatments were rendered)     Pre-treatment Symptoms/Complaints:  \"Can we work on my nails. \"  Pain: Initial:   Pain Intensity 1: 0 Post Session: Unchanged     Therapeutic Activity: (    25 minutes): Therapeutic activities including Bed transfers, Chair transfers, Ambulation on level ground and wheelchair management to improve mobility, strength, balance and coordination. Required minimal verbal/tactile/visual cues   to promote dynamic balance in standing and promote motor control of left, upper extremity(s), lower extremity(s). n/a    Neuromuscular Re-education: ( ):  Exercise/activities per grid below to improve balance, coordination and posture. Required moderate visual, verbal, manual and tactile cues to promote dynamic balance in standing and promote motor control of left, upper extremity(s).            Date:  4/7/20 Date:   Date:     Activity/Exercise Parameters Parameters Parameters Midline orientation sit to stand  Moderate cues and facilitation to decrease rotation at trunk and for midline trunk position     Weightbearing into L side standing at sink  Minimal facilitation at the L UE; ~10 reps      Forward reach with cane 10 reps with facilitation at the elbow/scapula      External rotation with cane 10 reps with minimal facilitation      Posture  Upright posture with thoracic extension and B hands place in the lap                    Braces/Orthotics/Lines/Etc:   · O2 device: Room air  · LUE sling  Treatment/Session Assessment:    · Response to Treatment:  Pt with increased assist needed for bed mobility today. · Interdisciplinary Collaboration:   o Occupational Therapist  o Registered Nurse  · After treatment position/precautions:   o Up in chair  o Bed alarm/tab alert on  o Bed/Chair-wheels locked  o Bed in low position  o Call light within reach  o RN notified   · Compliance with Program/Exercises: Compliant all of the time, Will assess as treatment progresses. · Recommendations/Intent for next treatment session: \"Next visit will focus on advancements to more challenging activities and reduction in assistance provided\".   Total Treatment Duration:   OT Patient Time In/Time Out  Time In: 1410  Time Out: 1200 Josh Jackson, OT

## 2020-04-09 NOTE — PROGRESS NOTES
Neurolinguistic Goals: goals per progress note 3/10/2020  LTG: Patient will increase neuro-linguistic abilities to increase safety and awareness in functional living environment   STG: Patient will solve mathematical problems with 80%accuracy given minimal cueing. Not met 3/10/2020. No met/discontinued 4/9/20. STG: Patient will name 10 items to  abstract category in 1 minute given  moderate cueing. Progressing 1/31/2020. MET&edited 3/10/2020. Not met/discontinued 4/9/20. STG: Patient will participate in verbal reasoning tasks with 80% accuracy given minimal cueing. Progressing 3/10/2020. Not met/discontinued 4/9/20. STG: Patient will complete mental manipulation tasks with 80% accuracy given minimal cueing. Not met 3/10/2020. Not met/discontinued 4/9/20. STG: Patient will complete working memory/attention tasks with 80% accuracy given minimal cueing. Progressing 3/10/2020. Not met/discontinued 4/9/20  STG: Patient will recall relevant verbal information with 80% accuracy with minimal assistance. Progressing 3/10/2020. Not met/discontinued 4/9/20  STG: Patient will utilize memory compensatory strategies to improve recall of functional list/message with 90%accuracy given minimal assistance. Progressing 3/10/2020. Not met/discontinued 4/9/20.     Dysarthria Goals:  LTG: Patient will develop functional and intelligible speech and utilize compensatory strategies to improve communication across environments. STG: Patient will utilize compensatory strategies at conversation level with 90% accuracy independently.  Progressing 3/10/2020      SPEECH LANGUAGE PATHOLOGY: SPEECH-LANGUAGE/COGNITION: Daily Note 8 and Discharge    NAME/AGE/GENDER: Fernanda Vieyra is a 53 y.o. male  DATE: 4/9/2020  PRIMARY DIAGNOSIS: Acute ischemic stroke [facility]) [I63.9]  Cerebrovascular accident (CVA) due to embolism ([facility] [address] [I63.9]      ICD-10: Treatment Diagnosis: R47.1 Dysarthria and Anarthria  R41.841 Cognitive-Communication Deficit    RECOMMENDATIONS   TREATMENT RECOMMENDATIONS:   No further cognitive linguistic treatment clinically indicated. STRATEGIES:    Speech strategies: slow rate/over articulate to improve articulatory precision   Memory strategies: repeat and write down novel/relevant information     EDUCATION:  · Recommendations discussed with Patient, Hospitalist   Continuation of Skilled Services/Medical Necessity:   Skilled speech therapy services no longer clinically indicated at this time. RECOMMENDATIONS for CONTINUED SPEECH THERAPY: No further speech therapy indicated at this time. ASSESSMENT    Patient continues to present with moderate short term memory impairment; however, significantly improved use of compensatory strategies for memory defict since start of care, with patient utilizing trained strategies with min-mod verbal cues. Basic reasoning skills for ADLs intact; however, reasoning for daily math continues to be impaired. Patient reports this is his baseline stating he has always struggled with math. Speech remains mildly dysarthric; but, is intelligible % of time given minimal cues to utilize compensatory strategies. Discontinue speech therapy services at this time. COMPLIANCE WITH PROGRAM/EXERCISES: Compliant most of the time  REHABILITATION POTENTIAL FOR STATED GOALS: Fair  PLAN    FREQUENCY/DURATION: Speech therapy services no longer clinically indicated. - Recommendations for next treatment session: No additional speech therapy indicated at this time. SUBJECTIVE   Upright in bed. Reports he is about to each lunch. Orientation:   Person  Place  Time  Situation     Pain: Pain Scale 1: Numeric (0 - 10)  Pain Intensity 1: 0    OBJECTIVE   recalled compensatory strategies for speech intelligiblity and memory with 90% accuracy given minimal cues.   -Patient verbally presented with appointments/directions and wrote down critical key points with 14/18 accurate independently. Tool Used: MODIFIED BRITT SCALE (mRS)   Score   No Symptoms  [] 0   No significant disability despite symptoms; able to carry out all usual duties and activities  [] 1   Slight disability; unable to carry out all previous activities but able to look after own affairs without assistance. [] 2   Moderate disability; requiring some help but able to walk without assistance  [x] 3   Moderately severe disability; unable to walk without assistance and unable to attend to own bodily needs without assistance  [] 4   Severe disability; bedridden, incontinent, and requiring constant nursing care and attention  [] 5      Score:  Initial: 3 Current: 3   Interpretation of Tool: The Modified [place] Scale is a 7-point scaled used to quantify level of disability as it relates to a patient's functional abilities. INTERDISCIPLINARY COLLABORATION: Registered Nurse  PRECAUTIONS/ALLERGIES: Patient has no known allergies.      SAFETY:  After treatment position/precautions:  · Supine in bed  · Call light within reach    Total Treatment Duration:     Time In: 1128  Time Out: 140 [facility], [id number] [facility]

## 2020-04-09 NOTE — PROGRESS NOTES
Problem: Patient Education: Go to Patient Education Activity  Goal: Patient/Family Education  Outcome: Progressing Towards Goal     Problem: Pressure Injury - Risk of  Goal: *Prevention of pressure injury  Description: Document Dewey Scale and appropriate interventions in the flowsheet. Outcome: Progressing Towards Goal  Note: Pressure Injury Interventions:  Sensory Interventions: Assess changes in LOC, Float heels, Keep linens dry and wrinkle-free, Maintain/enhance activity level    Moisture Interventions: Absorbent underpads, Check for incontinence Q2 hours and as needed    Activity Interventions: Increase time out of bed    Mobility Interventions: Pressure redistribution bed/mattress (bed type)    Nutrition Interventions: Document food/fluid/supplement intake, Offer support with meals,snacks and hydration    Friction and Shear Interventions: Lift sheet                Problem: Patient Education: Go to Patient Education Activity  Goal: Patient/Family Education  Outcome: Progressing Towards Goal     Problem: Falls - Risk of  Goal: *Absence of Falls  Description: Document Jeanne Fall Risk and appropriate interventions in the flowsheet. Outcome: Progressing Towards Goal  Note: Fall Risk Interventions:  Mobility Interventions: Bed/chair exit alarm    Mentation Interventions: Bed/chair exit alarm    Medication Interventions: Patient to call before getting OOB    Elimination Interventions: Call light in reach    History of Falls Interventions: Door open when patient unattended         Problem: Patient Education: Go to Patient Education Activity  Goal: Patient/Family Education  Outcome: Progressing Towards Goal     Problem: Diabetes Self-Management  Goal: *Disease process and treatment process  Description: Define diabetes and identify own type of diabetes; list 3 options for treating diabetes.   Outcome: Progressing Towards Goal  Goal: *Incorporating nutritional management into lifestyle  Description: Describe effect of type, amount and timing of food on blood glucose; list 3 methods for planning meals. Outcome: Progressing Towards Goal  Goal: *Incorporating physical activity into lifestyle  Description: State effect of exercise on blood glucose levels. Outcome: Progressing Towards Goal  Goal: *Developing strategies to promote health/change behavior  Description: Define the ABC's of diabetes; identify appropriate screenings, schedule and personal plan for screenings. Outcome: Progressing Towards Goal  Goal: *Using medications safely  Description: State effect of diabetes medications on diabetes; name diabetes medication taking, action and side effects. Outcome: Progressing Towards Goal  Goal: *Monitoring blood glucose, interpreting and using results  Description: Identify recommended blood glucose targets  and personal targets. Outcome: Progressing Towards Goal  Goal: *Prevention, detection, treatment of acute complications  Description: List symptoms of hyper- and hypoglycemia; describe how to treat low blood sugar and actions for lowering  high blood glucose level. Outcome: Progressing Towards Goal  Goal: *Prevention, detection and treatment of chronic complications  Description: Define the natural course of diabetes and describe the relationship of blood glucose levels to long term complications of diabetes.   Outcome: Progressing Towards Goal  Goal: *Developing strategies to address psychosocial issues  Description: Describe feelings about living with diabetes; identify support needed and support network  Outcome: Progressing Towards Goal     Problem: Patient Education: Go to Patient Education Activity  Goal: Patient/Family Education  Outcome: Progressing Towards Goal     Problem: Patient Education: Go to Patient Education Activity  Goal: Patient/Family Education  Outcome: Progressing Towards Goal     Problem: Nutrition Deficit  Goal: *Optimize nutritional status  Outcome: Progressing Towards Goal     Problem: Patient Education: Go to Patient Education Activity  Goal: Patient/Family Education  Outcome: Progressing Towards Goal     Problem: General Medical Care Plan  Goal: *Vital signs within specified parameters  Outcome: Progressing Towards Goal  Goal: *Labs within defined limits  Outcome: Progressing Towards Goal  Goal: *Absence of infection signs and symptoms  Description: Wash hand more often   Outcome: Progressing Towards Goal  Goal: *Optimal pain control at patient's stated goal  Outcome: Progressing Towards Goal  Goal: *Skin integrity maintained  Outcome: Progressing Towards Goal  Goal: *Fluid volume balance  Outcome: Progressing Towards Goal  Goal: *Optimize nutritional status  Outcome: Progressing Towards Goal  Goal: *Anxiety reduced or absent  Outcome: Progressing Towards Goal  Goal: *Progressive mobility and function (eg: ADL's)  Outcome: Progressing Towards Goal     Problem: Patient Education: Go to Patient Education Activity  Goal: Patient/Family Education  Outcome: Progressing Towards Goal     Problem: Patient Education: Go to Patient Education Activity  Goal: Patient/Family Education  Outcome: Progressing Towards Goal     Problem: Patient Education: Go to Patient Education Activity  Goal: Patient/Family Education  Outcome: Progressing Towards Goal     Problem: Patient Education: Go to Patient Education Activity  Goal: Patient/Family Education  Outcome: Progressing Towards Goal     Problem: Pain  Goal: *Control of Pain  Outcome: Progressing Towards Goal     Problem: Patient Education: Go to Patient Education Activity  Goal: Patient/Family Education  Outcome: Progressing Towards Goal     Problem: Patient Education: Go to Patient Education Activity  Goal: Patient/Family Education  Outcome: Progressing Towards Goal

## 2020-04-10 LAB
ALBUMIN SERPL-MCNC: 2.6 G/DL (ref 3.5–5)
ALBUMIN/GLOB SERPL: 0.7 {RATIO} (ref 1.2–3.5)
ALP SERPL-CCNC: 65 U/L (ref 50–136)
ALT SERPL-CCNC: 22 U/L (ref 12–65)
ANION GAP SERPL CALC-SCNC: 6 MMOL/L (ref 7–16)
AST SERPL-CCNC: 18 U/L (ref 15–37)
BASOPHILS # BLD: 0 K/UL (ref 0–0.2)
BASOPHILS NFR BLD: 1 % (ref 0–2)
BILIRUB SERPL-MCNC: 0.5 MG/DL (ref 0.2–1.1)
BUN SERPL-MCNC: 15 MG/DL (ref 6–23)
CALCIUM SERPL-MCNC: 9.5 MG/DL (ref 8.3–10.4)
CHLORIDE SERPL-SCNC: 109 MMOL/L (ref 98–107)
CO2 SERPL-SCNC: 27 MMOL/L (ref 21–32)
CREAT SERPL-MCNC: 1.04 MG/DL (ref 0.8–1.5)
DIFFERENTIAL METHOD BLD: ABNORMAL
EOSINOPHIL # BLD: 0.1 K/UL (ref 0–0.8)
EOSINOPHIL NFR BLD: 1 % (ref 0.5–7.8)
ERYTHROCYTE [DISTWIDTH] IN BLOOD BY AUTOMATED COUNT: 16.8 % (ref 11.9–14.6)
GLOBULIN SER CALC-MCNC: 3.9 G/DL (ref 2.3–3.5)
GLUCOSE BLD STRIP.AUTO-MCNC: 101 MG/DL (ref 65–100)
GLUCOSE SERPL-MCNC: 104 MG/DL (ref 65–100)
HCT VFR BLD AUTO: 34.7 % (ref 41.1–50.3)
HGB BLD-MCNC: 10.9 G/DL (ref 13.6–17.2)
IMM GRANULOCYTES # BLD AUTO: 0 K/UL (ref 0–0.5)
IMM GRANULOCYTES NFR BLD AUTO: 0 % (ref 0–5)
LYMPHOCYTES # BLD: 2.3 K/UL (ref 0.5–4.6)
LYMPHOCYTES NFR BLD: 45 % (ref 13–44)
MAGNESIUM SERPL-MCNC: 1.3 MG/DL (ref 1.8–2.4)
MCH RBC QN AUTO: 26.5 PG (ref 26.1–32.9)
MCHC RBC AUTO-ENTMCNC: 31.4 G/DL (ref 31.4–35)
MCV RBC AUTO: 84.2 FL (ref 79.6–97.8)
MONOCYTES # BLD: 0.4 K/UL (ref 0.1–1.3)
MONOCYTES NFR BLD: 8 % (ref 4–12)
NEUTS SEG # BLD: 2.3 K/UL (ref 1.7–8.2)
NEUTS SEG NFR BLD: 45 % (ref 43–78)
NRBC # BLD: 0 K/UL (ref 0–0.2)
PLATELET # BLD AUTO: 180 K/UL (ref 150–450)
PMV BLD AUTO: 11.5 FL (ref 9.4–12.3)
POTASSIUM SERPL-SCNC: 4.1 MMOL/L (ref 3.5–5.1)
PROT SERPL-MCNC: 6.5 G/DL (ref 6.3–8.2)
RBC # BLD AUTO: 4.12 M/UL (ref 4.23–5.6)
SODIUM SERPL-SCNC: 142 MMOL/L (ref 136–145)
WBC # BLD AUTO: 5.2 K/UL (ref 4.3–11.1)

## 2020-04-10 PROCEDURE — 74011250636 HC RX REV CODE- 250/636: Performed by: INTERNAL MEDICINE

## 2020-04-10 PROCEDURE — 85025 COMPLETE CBC W/AUTO DIFF WBC: CPT

## 2020-04-10 PROCEDURE — 74011250637 HC RX REV CODE- 250/637: Performed by: INTERNAL MEDICINE

## 2020-04-10 PROCEDURE — 65270000029 HC RM PRIVATE

## 2020-04-10 PROCEDURE — 80053 COMPREHEN METABOLIC PANEL: CPT

## 2020-04-10 PROCEDURE — 74011250636 HC RX REV CODE- 250/636: Performed by: FAMILY MEDICINE

## 2020-04-10 PROCEDURE — 74011250637 HC RX REV CODE- 250/637: Performed by: FAMILY MEDICINE

## 2020-04-10 PROCEDURE — 74011250637 HC RX REV CODE- 250/637: Performed by: HOSPITALIST

## 2020-04-10 PROCEDURE — 36415 COLL VENOUS BLD VENIPUNCTURE: CPT

## 2020-04-10 PROCEDURE — 82962 GLUCOSE BLOOD TEST: CPT

## 2020-04-10 PROCEDURE — 74011250636 HC RX REV CODE- 250/636: Performed by: NURSE PRACTITIONER

## 2020-04-10 PROCEDURE — 83735 ASSAY OF MAGNESIUM: CPT

## 2020-04-10 RX ORDER — MAGNESIUM SULFATE HEPTAHYDRATE 40 MG/ML
2 INJECTION, SOLUTION INTRAVENOUS ONCE
Status: COMPLETED | OUTPATIENT
Start: 2020-04-10 | End: 2020-04-10

## 2020-04-10 RX ADMIN — METOPROLOL SUCCINATE 25 MG: 25 TABLET, FILM COATED, EXTENDED RELEASE ORAL at 08:23

## 2020-04-10 RX ADMIN — ACETAMINOPHEN 650 MG: 325 TABLET, FILM COATED ORAL at 18:18

## 2020-04-10 RX ADMIN — Medication 400 MG: at 08:23

## 2020-04-10 RX ADMIN — GUAIFENESIN 100 MG: 100 SOLUTION ORAL at 06:18

## 2020-04-10 RX ADMIN — ENOXAPARIN SODIUM 40 MG: 40 INJECTION SUBCUTANEOUS at 13:16

## 2020-04-10 RX ADMIN — ASPIRIN 81 MG 81 MG: 81 TABLET ORAL at 08:23

## 2020-04-10 RX ADMIN — LISINOPRIL 40 MG: 20 TABLET ORAL at 08:23

## 2020-04-10 RX ADMIN — Medication 10 ML: at 13:17

## 2020-04-10 RX ADMIN — ACETAMINOPHEN 650 MG: 325 TABLET, FILM COATED ORAL at 06:18

## 2020-04-10 RX ADMIN — DIPHENHYDRAMINE HYDROCHLORIDE 25 MG: 25 CAPSULE ORAL at 18:18

## 2020-04-10 RX ADMIN — ACETAMINOPHEN 650 MG: 325 TABLET, FILM COATED ORAL at 13:16

## 2020-04-10 RX ADMIN — METFORMIN HYDROCHLORIDE 500 MG: 500 TABLET ORAL at 08:23

## 2020-04-10 RX ADMIN — ACETAMINOPHEN 650 MG: 325 TABLET, FILM COATED ORAL at 21:54

## 2020-04-10 RX ADMIN — MAGNESIUM SULFATE HEPTAHYDRATE 2 G: 40 INJECTION, SOLUTION INTRAVENOUS at 06:18

## 2020-04-10 RX ADMIN — MAGNESIUM SULFATE HEPTAHYDRATE 2 G: 40 INJECTION, SOLUTION INTRAVENOUS at 09:53

## 2020-04-10 RX ADMIN — GUAIFENESIN 100 MG: 100 SOLUTION ORAL at 18:18

## 2020-04-10 RX ADMIN — DIPHENHYDRAMINE HYDROCHLORIDE 25 MG: 25 CAPSULE ORAL at 06:18

## 2020-04-10 RX ADMIN — ATORVASTATIN CALCIUM 80 MG: 80 TABLET, FILM COATED ORAL at 21:54

## 2020-04-10 NOTE — PROGRESS NOTES
Hospitalist Progress Note    Subjective:   Daily Progress Note: 4/9/2020 9:58 PM    Patient admitted 12/12/19 with acute right side embolic stroke with left side residual weakness and left facial droop. CT brain on admission with indeterminate infarctions within the left corona radiata/caudate. CTA of the head and neck with stenosis at the distal segment of the right  Vertebral artery and multifocal stenosis in the P2 segment of the left posterior cerebral artery. MRI brain with acute to early subacute infarcts in the left cerebellar hemisphere in the periventricular deep left frontoparietal white matter and likely additional areas of restricted diffusion in the right paramedian yariel. Echo with + PFO, on statin and ASA. Will need event monitor on discharge and if no arrhythmia, PFO closure recommended. Wife informs CM she does not want patient at home, they were planning to separate prior to this stroke. CM attempting  to place in STR, medicaid pending. 4/9:  In good spirits today. Working with OT. No complaints, talking on phone. Speech remains slightly slurred.      ADDITIONAL HISTORY:  DM II, HTN, pancreatitis,     Current Facility-Administered Medications   Medication Dose Route Frequency    metFORMIN (GLUCOPHAGE) tablet 500 mg  500 mg Oral DAILY WITH BREAKFAST    magnesium oxide (MAG-OX) tablet 400 mg  400 mg Oral DAILY    acetaminophen (TYLENOL) tablet 650 mg  650 mg Oral Q4H PRN    diphenhydrAMINE (BENADRYL) capsule 25 mg  25 mg Oral Q6H PRN    acetaminophen (TYLENOL) tablet 650 mg  650 mg Oral Q8H    lip protectant (BLISTEX) ointment 1 Each  1 Each Topical PRN    metoprolol succinate (TOPROL-XL) XL tablet 25 mg  25 mg Oral DAILY    polyethylene glycol (MIRALAX) packet 17 g  17 g Oral DAILY PRN    guaiFENesin (ROBITUSSIN) 100 mg/5 mL oral liquid 100 mg  100 mg Oral Q4H PRN    hydrALAZINE (APRESOLINE) 20 mg/mL injection 20 mg  20 mg IntraVENous Q4H PRN    atorvastatin (LIPITOR) tablet 80 mg  80 mg Oral QHS    lisinopril (PRINIVIL, ZESTRIL) tablet 40 mg  40 mg Oral DAILY    sodium chloride (NS) flush 5-40 mL  5-40 mL IntraVENous PRN    ondansetron (ZOFRAN) injection 4 mg  4 mg IntraVENous Q4H PRN    aspirin chewable tablet 81 mg  81 mg Oral DAILY    enoxaparin (LOVENOX) injection 40 mg  40 mg SubCUTAneous Q24H    dextrose 40% (GLUTOSE) oral gel 1 Tube  15 g Oral PRN    glucagon (GLUCAGEN) injection 1 mg  1 mg IntraMUSCular PRN    dextrose (D50W) injection syrg 12.5-25 g  25-50 mL IntraVENous PRN      Review of Systems  A comprehensive review of systems was negative except for that written in the HPI. Objective:     Visit Vitals  /83 (BP 1 Location: Right arm, BP Patient Position: At rest)   Pulse 80   Temp 98.2 °F (36.8 °C)   Resp 17   Ht 5' 6\" (1.676 m)   Wt 95.3 kg (210 lb)   SpO2 100%   BMI 33.89 kg/m²      O2 Device: Room air    Temp (24hrs), Av.9 °F (36.6 °C), Min:97.6 °F (36.4 °C), Max:98.3 °F (36.8 °C)    701 -  1900  In: 360 [P.O.:360]  Out: -     General appearance: Oriented and alert, cooperative, denies need or new issues. In good spirits. Head: Normocephalic, without obvious abnormality, atraumatic  Eyes: conjunctivae/corneas clear. PERRL  Throat: Continued mild left facial weakness. Lips, mucosa, and tongue normal. Teeth and gums normal  Neck: supple, symmetrical, trachea midline, no JVD  Lungs: clear to auscultation bilaterally  Heart: regular rate and rhythm, S1, S2 normal, no murmur, click, rub or gallop  Abdomen: soft, non-tender. Bowel sounds normal. No masses,  no organomegaly  Extremities: Right lower and upper extremities normal, atraumatic, no cyanosis or edema. Persistent left side residual weakness, both upper and lower extremities on left weak. Skin: Skin color, texture, turgor normal. No rashes or lesions  Neurologic: As above.      Additional comments: Notes,orders, test results, vitals reviewed    Data Review  Recent Results (from the past 24 hour(s))   GLUCOSE, POC    Collection Time: 04/09/20  7:17 AM   Result Value Ref Range    Glucose (POC) 108 (H) 65 - 100 mg/dL      1/8/20:  SWALLOW FUNCTION VIDEO: Small quantity aspiration with straw sips of thin liquids.     Assessment/Plan:   Acute right embolic stroke with left side weakness 12/12/2019   Making good progress with PT/OT    Dysarthria due to stroke:  Improved immensely   SLP releasing today due to    Improved without need for further inter-   vention     Difficult placement with marital discord prior to stroke: Wife does not want him at home:  CM working on tirelessly   Now issues obtaining MR for medicaid  application     Hypertension:  Continue lisinopril, toprol XL    IDDM II:  Continue glucophage, diabetic diet    Arrhythmia:  On beta blocker    PFO 12/17/2019:      Hypomagnesemia:  Replace and recheck     Labs tomorrow:  Last labs 3/31.     Care Plan discussed with: Patient and Nurse    Signed By: Paige Yoon NP     April 9, 2020

## 2020-04-10 NOTE — PROGRESS NOTES
Problem: Mobility Impaired (Adult and Pediatric)  Goal: *Acute Goals and Plan of Care  Description  GOALS UPDATED ON RE-ASSESSMENT 4/1/2020 (advancements to goals in bold):  1. Patient will perform bed mobility with supervision and 0 verbal cues within 7 treatment days. 2. Patient will perform transfer bed to chair with SUPERVISION within 7 treatment days. 3. Patient will demonstrate fair+ dynamic balance throughout stance phase on L LE within 7 treatment days. 4. Patient will require STAND BY ASSIST throughout swing phase on (to advance) L LE within 7 treatment days. 5. Patient demonstrate 3+/5 strength in L LE hip flexion, hip abduction, and hip adduction within 7 treatment days. 6. Patient will ambulate 200ft+ with STAND BY ASSIST and least retrictive assistive device within 7 treatment days. 7. Patient will perform wheelchair mobility x150ft with MODIFIED INDEPENDENCE within 7 treatment days. 8. Mr. Karlos Alvarenga will be independent with wheelchair locking/unlocking mechanism as well as leg rest manipulation within 7 days to improve safety and independence. 9. Mr. Karlos Alvarenga will don/doff LUE sling for protection of L shoulder during transfers/gait within 7 treatment days. PHYSICAL THERAPY: Daily Note, Re-evaluation and PM 4/10/2020  INPATIENT: PT Visit Days : 1  Payor: MEDICAID PENDING / Plan: Ron Ganser PENDING / Product Type: Medicaid /       NAME/AGE/GENDER: Britney Burgess is a 53 y.o. male   PRIMARY DIAGNOSIS: Acute ischemic stroke ([address] [I63.9]  Cerebrovascular accident (CVA) due to embolism ([address] [Y66.8] Acute embolic stroke ([address] Acute embolic stroke ([address]       ICD-10: Treatment Diagnosis:   · Difficulty in walking, Not elsewhere classified (R26.2)  · Hemiplegia and hemiparesis following cerebral infarction affecting left non-dominant side (K79.226)   Precaution/Allergies:  Patient has no known allergies. ASSESSMENT:     Mr. Karlos Alvarenga is a 53year old admitted R MCA CVA.   Patient seen this PM for physical therapy re-evaluation to address progression toward acute PT goals. Patient c/o pain in left wrist. Appears slightly swollen and red; RN notified. Patient demonstrates improved distal motor L UE as well as improved 3-/5 R quad strength and 3-/5 dorsiflexion. Sensation remains intact to light touch L UE/LE. Patient is currently requiring modified independence with bed mobility, SBA to CGA with transfers, and CGA with ambulation for a community level distance with hemiwalker and gait belt/wheelchair follow for safety. Patient is also able to propel wheelchair for household level distance with SBA and cues for L LE advancement and safety. Mr. Joanne Beach has made progress in ambulation distance, gait mechanics, and wheelchair mobility. Goals above remain appropriate and ongoing. [place] [facility] Short Form improved from 18 to 19. Continue to recommend inpatient rehabilitation at discharge; good rehab potential based on progress thus far, independent prior level of functioning, and high levels of motivation. Upgraded wheelchair mobility goal; others remain appropriate. No formal re-evaluation charge. Treatment today included bed mobility with mod I, transfer sit to stand with SBA, ambulation x300ft with hemiwalker (gait belt and WC follow for safety) with CGA, and wheelchair management x100ft with SBA and verbal cues. Gait remains, slow, step-through hemipalegic pattern with mildly narrowed stance and increased weight bearing on R LE. Patient continues to demonstrate improved mechanics on L LE with improved heel strike and stance phase appreciated today. Quad strength with cueing continues to be factor through terminal stance to prevent hyperextension of L LE. Static standing balance is good and dynamic standing balance is fair. Good participation today. This section established at most recent assessment   PROBLEM LIST (Impairments causing functional limitations):  1.  Decreased Strength  2. Decreased ADL/Functional Activities  3. Decreased Transfer Abilities  4. Decreased Ambulation Ability/Technique  5. Decreased Balance  6. Increased Pain  7. Decreased Activity Tolerance  8. Increased Fatigue  9. Decreased Flexibility/Joint Mobility   INTERVENTIONS PLANNED: (Benefits and precautions of physical therapy have been discussed with the patient.)  1. Balance Exercise  2. Bed Mobility  3. Family Education  4. Gait Training  5. Home Exercise Program (HEP)  6. Manual Therapy  7. Neuromuscular Re-education/Strengthening  8. Range of Motion (ROM)  9. Therapeutic Activites  10. Therapeutic Exercise/Strengthening  11. Transfer Training     TREATMENT PLAN: Frequency/Duration: 3 times a week for duration of hospital stay  Rehabilitation Potential For Stated Goals: Good     REHAB RECOMMENDATIONS (at time of discharge pending progress):    Placement: It is my opinion, based on this patient's performance to date, that Mr. Osmar Davey may benefit from intensive therapy at an [address] [place] [address] after discharge due to a probable need for close medical supervision by a rehab physician, a probable need for multiple therapy disciplines and potential to make ongoing and sustainable functional improvement that is of practical value. .  Equipment:    TBD pending progress with therapy. HISTORY:   History of Present Injury/Illness (Reason for Referral):  Per H&P: \"Pt is a 52 y/o smoker with DM, HTN, who presented to ER with L leg and arm weakness, L facial droop, dysarthria. First noted L leg weakness late night 12/11 when he woke up to go to the bathroom. He was normal when he went to bed around 1030. Woke up this morning and had persistent weakness L leg and also now noted in L arm. EMS called. Noted to have slurred speech and L facial droop as well.   Code S called in ER around 9am.  MRI with acute infarcts in L cerebellar hemisphere, deep frontoparietal white matter, and R paramedian yariel.  Also noted old lacunar infarcts. No large vessel occlusion on CTA, but some stenosis noted. No hx afib, TIA, CVA. No CP, palpitations, SOB. \"  Past Medical History/Comorbidities:   Mr. Breanna Colbert  has a past medical history of Acute ischemic stroke ([address] (12/12/2019), Acute pancreatitis (11/19/2014), Cerebrovascular accident (CVA) due to embolism ([address] (12/12/2019), Diabetes ([address] (2002), Diabetes ([address] Diabetes mellitus, and Hypertension. He also has no past medical history of Arthritis, Asthma, Autoimmune disease ([address] CAD (coronary artery disease), Cancer ([address] Chronic kidney disease, COPD, Dementia, Dementia ([address] Heart failure ([address] Ill-defined condition, Infectious disease, Liver disease, Other ill-defined conditions(799.89), Psychiatric disorder, PUD (peptic ulcer disease), Seizures ([address] or Sleep disorder. Mr. Breanna Colbert  has a past surgical history that includes hx hernia repair and hx orthopaedic. Social History/Living Environment:   Home Environment: Apartment  # Steps to Enter: 12  Rails to Enter: Yes  [employer] : Bilateral  One/Two Story Residence: One story  Living Alone: No  Support Systems: Spouse/Significant Other/Partner  Patient Expects to be Discharged to[de-identified] Unknown  Current DME Used/Available at Home: None  Tub or Shower Type: Tub/Shower combination  Prior Level of Function/Work/Activity:  Independent, lives with wife in 2nd story 1 level apartment. No recent falls.    Number of Personal Factors/Comorbidities that affect the Plan of Care: 1-2: MODERATE COMPLEXITY   EXAMINATION:   Most Recent Physical Functioning:   Gross Assessment:  AROM: Generally decreased, functional(L UE; L LE)  PROM: Within functional limits  Strength: Generally decreased, functional(L UE; L LE)  Coordination: Generally decreased, functional(L UE; L LE)  Tone: Abnormal(L UE; L LE)  Sensation: Intact(L UE; L LE)               Posture:     Balance:  Sitting: Intact  Sitting - Static: Good (unsupported)  Sitting - Dynamic: Good (unsupported)  Standing: Impaired  Standing - Static: Good  Standing - Dynamic : Fair Bed Mobility:  Supine to Sit: Modified independent  Scooting: Supervision  Wheelchair Mobility:  Distance (ft): 100 feet  Level of Assistance: Stand-by assistance  Interventions: Verbal cues  Transfers:  Sit to Stand: Stand-by assistance  Stand to Sit: Stand-by assistance  Bed to Chair: Contact guard assistance  Gait:     Base of Support: Center of gravity altered;Narrowed  Step Length: Left shortened  Gait Abnormalities: Hemiplegic  Distance (ft): 300 Feet (ft)  Assistive Device: Walker luke  Ambulation - Level of Assistance: Contact guard assistance  Interventions: Safety awareness training; Tactile cues; Verbal cues      Body Structures Involved:  1. Nerves  2. Voice/Speech  3. Bones  4. Joints  5. Muscles Body Functions Affected:  1. Mental  2. Sensory/Pain  3. Neuromusculoskeletal  4. Movement Related Activities and Participation Affected:  1. General Tasks and Demands  2. Mobility  3. Self Care  4. Interpersonal Interactions and Relationships   Number of elements that affect the Plan of Care: 4+: HIGH COMPLEXITY   CLINICAL PRESENTATION:   Presentation: Evolving clinical presentation with changing clinical characteristics: MODERATE COMPLEXITY   CLINICAL DECISION MAKIN [facility] Inpatient Short Form  How much difficulty does the patient currently have. .. Unable A Lot A Little None   1. Turning over in bed (including adjusting bedclothes, sheets and blankets)? [] 1   [] 2   [] 3   [x] 4   2. Sitting down on and standing up from a chair with arms ( e.g., wheelchair, bedside commode, etc.)   [] 1   [] 2   [x] 3   [] 4   3. Moving from lying on back to sitting on the side of the bed? [] 1   [] 2   [] 3   [x] 4   How much help from another person does the patient currently need. .. Total A Lot A Little None   4.   Moving to and from a bed to a chair (including a wheelchair)? [] 1   [] 2   [x] 3   [] 4   5. Need to walk in hospital room? [] 1   [] 2   [x] 3   [] 4   6. Climbing 3-5 steps with a railing? [] 1   [x] 2   [] 3   [] 4   © 2007, Trustees of [address] [place] [address] [address] [id number], under license to [employer]. All rights reserved      Score:  Initial: 13 Most Recent: 19 (Date: 4/10/2020)    Interpretation of Tool:  Represents activities that are increasingly more difficult (i.e. Bed mobility, Transfers, Gait). Medical Necessity:     · Patient is expected to demonstrate progress in   · strength, range of motion, balance, coordination, and functional technique  ·  to   · increase independence with all mobility. · .  Reason for Services/Other Comments:  · Patient continues to require skilled intervention due to   · medical complications and mobility deficits which impact his level of function, safety, and independence as indicated above. · .   Use of outcome tool(s) and clinical judgement create a POC that gives a: Questionable prediction of patient's progress: MODERATE COMPLEXITY        TREATMENT:      Pre-treatment Symptoms/Complaints:  none  Pain: Initial: 0/10 Post Session:  0/10     Therapeutic Activity: (    25 Minutes): Therapeutic activities including bed mobility training, transfer training, ambulation on level ground with cues for gait mechanics, wheelchair mobility,, and patient education  to improve mobility, strength and balance. Required moderate Safety awareness training; Tactile cues; Verbal cues to promote static and dynamic balance in standing and promote coordination of bilateral, lower extremity(s).      Braces/Orthotics/Lines/Etc:   · Sling to LUE  Treatment/Session Assessment:    · Response to Treatment:  See above  · Interdisciplinary Collaboration:   o Physical Therapist  o Registered Nurse  o Rehabilitation Attendant  · After treatment position/precautions:   o In wheelchair with ambulation team   · Compliance with Program/Exercises: Compliant all of the time  · Recommendations/Intent for next treatment session: \"Next visit will focus on advancements to more challenging activities and reduction in assistance provided\".     Total Treatment Duration:  PT Patient Time In/Time Out  Time In: 1310  Time Out: 1019 LICHA LindsayT

## 2020-04-10 NOTE — PROGRESS NOTES
Hospitalist Progress Note    Subjective:   Daily Progress Note: 4/10/2020 9:36 AM    Patient admitted 12/12/19 with acute right side embolic stroke with left side residual weakness and left facial droop. CT brain on admission with indeterminate infarctions within the left corona radiata/caudate. CTA of the head and neck with stenosis at the distal segment of the right  Vertebral artery and multifocal stenosis in the P2 segment of the left posterior cerebral artery. MRI brain with acute to early subacute infarcts in the left cerebellar hemisphere in the periventricular deep left frontoparietal white matter and likely additional areas of restricted diffusion in the right paramedian yariel. Echo with + PFO, on statin and ASA. Will need event monitor on discharge and if no arrhythmia, PFO closure recommended. Wife informs CM she does not want patient at home, they were planning to separate prior to this stroke. CM attempting  to place in STR, medicaid pending. 4/9:  In good spirits today. Working with OT. No complaints, talking on phone. Speech remains slightly slurred, SLP signing off as clinical indication no longer needed. 4/10:   Continued good humor. Working with PT, OT. No complaints. Mag critically low today.   Replacing, recheck in am     Current Facility-Administered Medications   Medication Dose Route Frequency    metFORMIN (GLUCOPHAGE) tablet 500 mg  500 mg Oral DAILY WITH BREAKFAST    magnesium oxide (MAG-OX) tablet 400 mg  400 mg Oral DAILY    acetaminophen (TYLENOL) tablet 650 mg  650 mg Oral Q4H PRN    diphenhydrAMINE (BENADRYL) capsule 25 mg  25 mg Oral Q6H PRN    acetaminophen (TYLENOL) tablet 650 mg  650 mg Oral Q8H    lip protectant (BLISTEX) ointment 1 Each  1 Each Topical PRN    metoprolol succinate (TOPROL-XL) XL tablet 25 mg  25 mg Oral DAILY    polyethylene glycol (MIRALAX) packet 17 g  17 g Oral DAILY PRN    guaiFENesin (ROBITUSSIN) 100 mg/5 mL oral liquid 100 mg  100 mg Oral Q4H PRN    hydrALAZINE (APRESOLINE) 20 mg/mL injection 20 mg  20 mg IntraVENous Q4H PRN    atorvastatin (LIPITOR) tablet 80 mg  80 mg Oral QHS    lisinopril (PRINIVIL, ZESTRIL) tablet 40 mg  40 mg Oral DAILY    sodium chloride (NS) flush 5-40 mL  5-40 mL IntraVENous PRN    ondansetron (ZOFRAN) injection 4 mg  4 mg IntraVENous Q4H PRN    aspirin chewable tablet 81 mg  81 mg Oral DAILY    enoxaparin (LOVENOX) injection 40 mg  40 mg SubCUTAneous Q24H    dextrose 40% (GLUTOSE) oral gel 1 Tube  15 g Oral PRN    glucagon (GLUCAGEN) injection 1 mg  1 mg IntraMUSCular PRN    dextrose (D50W) injection syrg 12.5-25 g  25-50 mL IntraVENous PRN        Review of Systems  A comprehensive review of systems was negative except for that written in the HPI. Objective:     Visit Vitals  /87 (BP 1 Location: Right arm, BP Patient Position: At rest)   Pulse 67   Temp 97.6 °F (36.4 °C)   Resp 17   Ht 5' 6\" (1.676 m)   Wt 95.3 kg (210 lb)   SpO2 99%   BMI 33.89 kg/m²      O2 Device: Room air    Temp (24hrs), Av.9 °F (36.6 °C), Min:97.6 °F (36.4 °C), Max:98.3 °F (36.8 °C)     1901 - 04/10 0700  In: 360 [P.O.:360]  Out: -     General appearance: Oriented and alert, cooperative, denies need or new issues. In good spirits. Head: Normocephalic, without obvious abnormality, atraumatic  Eyes: conjunctivae/corneas clear. PERRL  Throat: Continued mild left facial weakness. Lips, mucosa, and tongue normal. Teeth and gums normal  Neck: supple, symmetrical, trachea midline, no JVD  Lungs: clear to auscultation bilaterally  Heart: regular rate and rhythm, S1, S2 normal, no murmur, click, rub or gallop  Abdomen: soft, non-tender. Bowel sounds normal. No masses,  no organomegaly  Extremities: Right lower and upper extremities normal, atraumatic, no cyanosis or edema. Persistent left side residual weakness, both upper and lower extremities on left weak.    Skin: Skin color, texture, turgor normal. No rashes or lesions  Neurologic: As above.     Additional comments: Notes,orders, test results, vitals reviewed    Data Review  Recent Results (from the past 24 hour(s))   CBC WITH AUTOMATED DIFF    Collection Time: 04/10/20  4:12 AM   Result Value Ref Range    WBC 5.2 4.3 - 11.1 K/uL    RBC 4.12 (L) 4.23 - 5.6 M/uL    HGB 10.9 (L) 13.6 - 17.2 g/dL    HCT 34.7 (L) 41.1 - 50.3 %    MCV 84.2 79.6 - 97.8 FL    MCH 26.5 26.1 - 32.9 PG    MCHC 31.4 31.4 - 35.0 g/dL    RDW 16.8 (H) 11.9 - 14.6 %    PLATELET 904 139 - 720 K/uL    MPV 11.5 9.4 - 12.3 FL    ABSOLUTE NRBC 0.00 0.0 - 0.2 K/uL    DF AUTOMATED      NEUTROPHILS 45 43 - 78 %    LYMPHOCYTES 45 (H) 13 - 44 %    MONOCYTES 8 4.0 - 12.0 %    EOSINOPHILS 1 0.5 - 7.8 %    BASOPHILS 1 0.0 - 2.0 %    IMMATURE GRANULOCYTES 0 0.0 - 5.0 %    ABS. NEUTROPHILS 2.3 1.7 - 8.2 K/UL    ABS. LYMPHOCYTES 2.3 0.5 - 4.6 K/UL    ABS. MONOCYTES 0.4 0.1 - 1.3 K/UL    ABS. EOSINOPHILS 0.1 0.0 - 0.8 K/UL    ABS. BASOPHILS 0.0 0.0 - 0.2 K/UL    ABS. IMM. GRANS. 0.0 0.0 - 0.5 K/UL   METABOLIC PANEL, COMPREHENSIVE    Collection Time: 04/10/20  4:12 AM   Result Value Ref Range    Sodium 142 136 - 145 mmol/L    Potassium 4.1 3.5 - 5.1 mmol/L    Chloride 109 (H) 98 - 107 mmol/L    CO2 27 21 - 32 mmol/L    Anion gap 6 (L) 7 - 16 mmol/L    Glucose 104 (H) 65 - 100 mg/dL    BUN 15 6 - 23 MG/DL    Creatinine 1.04 0.8 - 1.5 MG/DL    GFR est AA >60 >60 ml/min/1.73m2    GFR est non-AA >60 >60 ml/min/1.73m2    Calcium 9.5 8.3 - 10.4 MG/DL    Bilirubin, total 0.5 0.2 - 1.1 MG/DL    ALT (SGPT) 22 12 - 65 U/L    AST (SGOT) 18 15 - 37 U/L    Alk.  phosphatase 65 50 - 136 U/L    Protein, total 6.5 6.3 - 8.2 g/dL    Albumin 2.6 (L) 3.5 - 5.0 g/dL    Globulin 3.9 (H) 2.3 - 3.5 g/dL    A-G Ratio 0.7 (L) 1.2 - 3.5     MAGNESIUM    Collection Time: 04/10/20  4:12 AM   Result Value Ref Range    Magnesium 1.3 (LL) 1.8 - 2.4 mg/dL   GLUCOSE, POC    Collection Time: 04/10/20  5:45 AM   Result Value Ref Range    Glucose (POC) 101 (H) 65 - 100 mg/dL       20:  SWALLOW FUNCTION VIDEO: Small quantity aspiration with straw sips of thin liquids. Assessment/Plan:   Acute right embolic stroke with left side weakness 2019              Making good progress with PT/OT     Dysarthria due to stroke:  Improved immensely              SLP releasing today due to               Improved without need for further inter-              vention      Difficult placement with marital discord prior to stroke:   Wife does not want him at home:  CM working on tirelessly              Now issues obtaining MR for medicaid  application    CM continues to pursue placement      Hypertension:  Continue lisinopril, toprol XL     IDDM II:  Continue glucophage, diabetic diet     Arrhythmia:  On beta blocker     PFO 2019:       Hypomagnesemia:  4/10:  Ma.3   Administered today: 4 GMs IV   Recheck in am     Care Plan discussed with: Patient and Nurse    Signed By: Dileep Quintana NP     April 10, 2020

## 2020-04-10 NOTE — PROGRESS NOTES
Problem: Patient Education: Go to Patient Education Activity  Goal: Patient/Family Education  Outcome: Progressing Towards Goal     Problem: Pressure Injury - Risk of  Goal: *Prevention of pressure injury  Description: Document Dewey Scale and appropriate interventions in the flowsheet. Outcome: Progressing Towards Goal  Note: Pressure Injury Interventions:  Sensory Interventions: Assess changes in LOC, Float heels, Keep linens dry and wrinkle-free, Maintain/enhance activity level    Moisture Interventions: Absorbent underpads, Check for incontinence Q2 hours and as needed, Limit adult briefs, Minimize layers    Activity Interventions: Increase time out of bed, Pressure redistribution bed/mattress(bed type), PT/OT evaluation    Mobility Interventions: HOB 30 degrees or less, Pressure redistribution bed/mattress (bed type), PT/OT evaluation    Nutrition Interventions: Document food/fluid/supplement intake    Friction and Shear Interventions: Lift sheet                Problem: Patient Education: Go to Patient Education Activity  Goal: Patient/Family Education  Outcome: Progressing Towards Goal     Problem: Falls - Risk of  Goal: *Absence of Falls  Description: Document Jeanne Fall Risk and appropriate interventions in the flowsheet.   Outcome: Progressing Towards Goal  Note: Fall Risk Interventions:  Mobility Interventions: Bed/chair exit alarm, Communicate number of staff needed for ambulation/transfer, Patient to call before getting OOB, OT consult for ADLs, PT Consult for mobility concerns    Mentation Interventions: Adequate sleep, hydration, pain control, Bed/chair exit alarm, Door open when patient unattended, Increase mobility, More frequent rounding, Reorient patient    Medication Interventions: Bed/chair exit alarm, Patient to call before getting OOB, Teach patient to arise slowly    Elimination Interventions: Call light in reach, Bed/chair exit alarm, Patient to call for help with toileting needs, Stay With Me (per policy), Toilet paper/wipes in reach, Toileting schedule/hourly rounds    History of Falls Interventions: Door open when patient unattended         Problem: Patient Education: Go to Patient Education Activity  Goal: Patient/Family Education  Outcome: Progressing Towards Goal     Problem: Diabetes Self-Management  Goal: *Disease process and treatment process  Description: Define diabetes and identify own type of diabetes; list 3 options for treating diabetes. Outcome: Progressing Towards Goal  Goal: *Incorporating nutritional management into lifestyle  Description: Describe effect of type, amount and timing of food on blood glucose; list 3 methods for planning meals. Outcome: Progressing Towards Goal  Goal: *Incorporating physical activity into lifestyle  Description: State effect of exercise on blood glucose levels. Outcome: Progressing Towards Goal  Goal: *Developing strategies to promote health/change behavior  Description: Define the ABC's of diabetes; identify appropriate screenings, schedule and personal plan for screenings. Outcome: Progressing Towards Goal  Goal: *Using medications safely  Description: State effect of diabetes medications on diabetes; name diabetes medication taking, action and side effects. Outcome: Progressing Towards Goal  Goal: *Monitoring blood glucose, interpreting and using results  Description: Identify recommended blood glucose targets  and personal targets. Outcome: Progressing Towards Goal  Goal: *Prevention, detection, treatment of acute complications  Description: List symptoms of hyper- and hypoglycemia; describe how to treat low blood sugar and actions for lowering  high blood glucose level. Outcome: Progressing Towards Goal  Goal: *Prevention, detection and treatment of chronic complications  Description: Define the natural course of diabetes and describe the relationship of blood glucose levels to long term complications of diabetes.   Outcome: Progressing Towards Goal  Goal: *Developing strategies to address psychosocial issues  Description: Describe feelings about living with diabetes; identify support needed and support network  Outcome: Progressing Towards Goal     Problem: Patient Education: Go to Patient Education Activity  Goal: Patient/Family Education  Outcome: Progressing Towards Goal     Problem: Patient Education: Go to Patient Education Activity  Goal: Patient/Family Education  Outcome: Progressing Towards Goal     Problem: Nutrition Deficit  Goal: *Optimize nutritional status  Outcome: Progressing Towards Goal     Problem: Patient Education: Go to Patient Education Activity  Goal: Patient/Family Education  Outcome: Progressing Towards Goal     Problem: General Medical Care Plan  Goal: *Vital signs within specified parameters  Outcome: Progressing Towards Goal  Goal: *Labs within defined limits  Outcome: Progressing Towards Goal  Goal: *Absence of infection signs and symptoms  Description: Wash hand more often   Outcome: Progressing Towards Goal  Goal: *Optimal pain control at patient's stated goal  Outcome: Progressing Towards Goal  Goal: *Skin integrity maintained  Outcome: Progressing Towards Goal  Goal: *Fluid volume balance  Outcome: Progressing Towards Goal  Goal: *Optimize nutritional status  Outcome: Progressing Towards Goal  Goal: *Anxiety reduced or absent  Outcome: Progressing Towards Goal  Goal: *Progressive mobility and function (eg: ADL's)  Outcome: Progressing Towards Goal     Problem: Patient Education: Go to Patient Education Activity  Goal: Patient/Family Education  Outcome: Progressing Towards Goal     Problem: Patient Education: Go to Patient Education Activity  Goal: Patient/Family Education  Outcome: Progressing Towards Goal     Problem: Patient Education: Go to Patient Education Activity  Goal: Patient/Family Education  Outcome: Progressing Towards Goal     Problem: Patient Education: Go to Patient Education Activity  Goal: Patient/Family Education  Outcome: Progressing Towards Goal     Problem: Ischemic Stroke: Discharge Outcomes  Goal: *Verbalizes anxiety and depression are reduced or absent  Outcome: Progressing Towards Goal  Goal: *Verbalize understanding of risk factor modification(Stroke Metric)  Outcome: Progressing Towards Goal  Goal: *Hemodynamically stable  Outcome: Progressing Towards Goal  Goal: *Absence of aspiration pneumonia  Outcome: Progressing Towards Goal  Goal: *Aware of needed dietary changes  Outcome: Progressing Towards Goal  Goal: *Verbalize understanding of prescribed medications including anti-coagulants, anti-lipid, and/or anti-platelets(Stroke Metric)  Outcome: Progressing Towards Goal  Goal: *Tolerating diet  Outcome: Progressing Towards Goal  Goal: *Aware of follow-up diagnostics related to anticoagulants  Outcome: Progressing Towards Goal  Goal: *Ability to perform ADLs and demonstrates progressive mobility and function  Outcome: Progressing Towards Goal  Goal: *Absence of DVT(Stroke Metric)  Outcome: Progressing Towards Goal  Goal: *Absence of aspiration  Outcome: Progressing Towards Goal  Goal: *Optimal pain control at patient's stated goal  Outcome: Progressing Towards Goal  Goal: *Home safety concerns addressed  Outcome: Progressing Towards Goal  Goal: *Describes available resources and support systems  Outcome: Progressing Towards Goal  Goal: *Verbalizes understanding of activation of EMS(911) for stroke symptoms(Stroke Metric)  Outcome: Progressing Towards Goal  Goal: *Understands and describes signs and symptoms to report to providers(Stroke Metric)  Outcome: Progressing Towards Goal  Goal: *Neurolgocially stable (absence of additional neurological deficits)  Outcome: Progressing Towards Goal  Goal: *Verbalizes importance of follow-up with primary care physician(Stroke Metric)  Outcome: Progressing Towards Goal  Goal: *Smoking cessation discussed,if applicable(Stroke Metric)  Outcome: Progressing Towards Goal  Goal: *Depression screening completed(Stroke Metric)  Outcome: Progressing Towards Goal     Problem: Pain  Goal: *Control of Pain  Outcome: Progressing Towards Goal     Problem: Patient Education: Go to Patient Education Activity  Goal: Patient/Family Education  Outcome: Progressing Towards Goal     Problem: Patient Education: Go to Patient Education Activity  Goal: Patient/Family Education  Outcome: Progressing Towards Goal

## 2020-04-11 LAB — MAGNESIUM SERPL-MCNC: 1.9 MG/DL (ref 1.8–2.4)

## 2020-04-11 PROCEDURE — 74011250637 HC RX REV CODE- 250/637: Performed by: HOSPITALIST

## 2020-04-11 PROCEDURE — 74011250637 HC RX REV CODE- 250/637: Performed by: INTERNAL MEDICINE

## 2020-04-11 PROCEDURE — 74011250636 HC RX REV CODE- 250/636: Performed by: INTERNAL MEDICINE

## 2020-04-11 PROCEDURE — 74011250637 HC RX REV CODE- 250/637: Performed by: FAMILY MEDICINE

## 2020-04-11 PROCEDURE — 65270000029 HC RM PRIVATE

## 2020-04-11 PROCEDURE — 83735 ASSAY OF MAGNESIUM: CPT

## 2020-04-11 PROCEDURE — 36415 COLL VENOUS BLD VENIPUNCTURE: CPT

## 2020-04-11 RX ADMIN — METFORMIN HYDROCHLORIDE 500 MG: 500 TABLET ORAL at 08:43

## 2020-04-11 RX ADMIN — ACETAMINOPHEN 650 MG: 325 TABLET, FILM COATED ORAL at 22:10

## 2020-04-11 RX ADMIN — ATORVASTATIN CALCIUM 80 MG: 80 TABLET, FILM COATED ORAL at 22:10

## 2020-04-11 RX ADMIN — GUAIFENESIN 100 MG: 100 SOLUTION ORAL at 05:37

## 2020-04-11 RX ADMIN — DIPHENHYDRAMINE HYDROCHLORIDE 25 MG: 25 CAPSULE ORAL at 17:52

## 2020-04-11 RX ADMIN — Medication 400 MG: at 08:43

## 2020-04-11 RX ADMIN — ASPIRIN 81 MG 81 MG: 81 TABLET ORAL at 08:43

## 2020-04-11 RX ADMIN — ACETAMINOPHEN 650 MG: 325 TABLET, FILM COATED ORAL at 13:03

## 2020-04-11 RX ADMIN — DIPHENHYDRAMINE HYDROCHLORIDE 25 MG: 25 CAPSULE ORAL at 05:38

## 2020-04-11 RX ADMIN — ACETAMINOPHEN 650 MG: 325 TABLET, FILM COATED ORAL at 05:38

## 2020-04-11 RX ADMIN — GUAIFENESIN 100 MG: 100 SOLUTION ORAL at 17:52

## 2020-04-11 RX ADMIN — METOPROLOL SUCCINATE 25 MG: 25 TABLET, FILM COATED, EXTENDED RELEASE ORAL at 08:43

## 2020-04-11 RX ADMIN — ENOXAPARIN SODIUM 40 MG: 40 INJECTION SUBCUTANEOUS at 13:02

## 2020-04-11 RX ADMIN — LISINOPRIL 40 MG: 20 TABLET ORAL at 08:43

## 2020-04-11 NOTE — PROGRESS NOTES
Hospitalist Progress Note     Admit Date:  2019  9:07 AM   Name:  Daina Pinzon   Age:  53 y.o.  :  1975   MRN:  [id number]   PCP:  Paula Jiménez MD  Treatment Team: Attending Provider: Oj Danielle MD; Care Manager: Callie Mir [facility]; Utilization Review: Josh Koenig RN; Nurse Practitioner: Alpesh Beltran NP; Care Manager: Robi Conteh RN    Subjective:   HPI and or CC:  52 yo AA male with PMH of DM, HTN admitted  for CVA affecting numerous areas of the brain. He was placed on ASA and statin. He has remained alert and oriented x3. Some movement to right side but unable to grasp with right hand. He is currently waiting on placement and this has remained difficult due to waiting on Medicaid. :   Alert and oriented x3. Voices no complaints today. He remains afebrile, no leukocytosis. NIH remains 7. On RA with saturations mid 90s. Continues to wait on SSI and Medicaid approval.      Objective:     Patient Vitals for the past 24 hrs:   Temp Pulse Resp BP SpO2   20 0800 97.6 °F (36.4 °C) 69 17 122/82 97 %   20 0400 97.6 °F (36.4 °C) 71 17 128/86 96 %   20 0000 98.9 °F (37.2 °C) 76 17 129/81 95 %   04/10/20 2000 97.7 °F (36.5 °C) 74 17 128/83 95 %   04/10/20 1600 98.1 °F (36.7 °C) 60 17 120/81 94 %   04/10/20 1200 98 °F (36.7 °C) 70 17 118/75 98 %     Oxygen Therapy  O2 Sat (%): 97 % (20 0800)  Pulse via Oximetry: 75 beats per minute (20 0400)  O2 Device: Room air (20 0400)  No intake or output data in the 24 hours ending 20 0941      REVIEW OF SYSTEMS: Comprehensive ROS performed and negative except as stated in HPI. Physical Examination:  General:       No acute distress    Lungs:          CTA bilaterally. Resp even and nonlabored  Heart:            S1S2 present without murmurs rubs gallops. RRR. No LE edema  Abdomen:    Soft, non tender, non distended. BS present  Extremities: No cyanosis.  Left sided hemiparesis  Neurologic:  moves right hand and raises arm at elbow moderately, unable to squeeze my fingers left hand, mildly slurred speech.  PERRLA, strength 4/5 RUE/RLE. Data Review:  I have reviewed all labs, meds, telemetry events, and studies from the last 24 hours.     Recent Results (from the past 24 hour(s))   MAGNESIUM    Collection Time: 04/11/20  4:32 AM   Result Value Ref Range    Magnesium 1.9 1.8 - 2.4 mg/dL        All Micro Results     None          Current Meds:  Current Facility-Administered Medications   Medication Dose Route Frequency    metFORMIN (GLUCOPHAGE) tablet 500 mg  500 mg Oral DAILY WITH BREAKFAST    magnesium oxide (MAG-OX) tablet 400 mg  400 mg Oral DAILY    acetaminophen (TYLENOL) tablet 650 mg  650 mg Oral Q4H PRN    diphenhydrAMINE (BENADRYL) capsule 25 mg  25 mg Oral Q6H PRN    acetaminophen (TYLENOL) tablet 650 mg  650 mg Oral Q8H    lip protectant (BLISTEX) ointment 1 Each  1 Each Topical PRN    metoprolol succinate (TOPROL-XL) XL tablet 25 mg  25 mg Oral DAILY    polyethylene glycol (MIRALAX) packet 17 g  17 g Oral DAILY PRN    guaiFENesin (ROBITUSSIN) 100 mg/5 mL oral liquid 100 mg  100 mg Oral Q4H PRN    hydrALAZINE (APRESOLINE) 20 mg/mL injection 20 mg  20 mg IntraVENous Q4H PRN    atorvastatin (LIPITOR) tablet 80 mg  80 mg Oral QHS    lisinopril (PRINIVIL, ZESTRIL) tablet 40 mg  40 mg Oral DAILY    sodium chloride (NS) flush 5-40 mL  5-40 mL IntraVENous PRN    ondansetron (ZOFRAN) injection 4 mg  4 mg IntraVENous Q4H PRN    aspirin chewable tablet 81 mg  81 mg Oral DAILY    enoxaparin (LOVENOX) injection 40 mg  40 mg SubCUTAneous Q24H    dextrose 40% (GLUTOSE) oral gel 1 Tube  15 g Oral PRN    glucagon (GLUCAGEN) injection 1 mg  1 mg IntraMUSCular PRN    dextrose (D50W) injection syrg 12.5-25 g  25-50 mL IntraVENous PRN       Diet:  DIET NUTRITIONAL SUPPLEMENTS  DIET DIABETIC CONSISTENT CARB    Other Studies (last 24 hours):  No results found.    Assessment and Plan:     Hospital Problems as of 4/11/2020 Date Reviewed: 3/3/2017          Codes Class Noted - Resolved POA    Hypomagnesemia ICD-10-CM: E83.42  ICD-9-CM: 275.2  3/7/2020 - Present Unknown        PFO (patent foramen ovale) ICD-10-CM: Q21.1  ICD-9-CM: 745.5  12/17/2019 - Present Yes        Arrhythmia ICD-10-CM: I49.9  ICD-9-CM: 427.9  12/15/2019 - Present Yes        Hyperlipemia ICD-10-CM: E78.5  ICD-9-CM: 272.4  12/15/2019 - Present Yes        * (Principal) Acute embolic stroke ([facility] [address] BAR-15-EX: I63.9  ICD-9-CM: 434.11  12/12/2019 - Present Yes        Hypertension (Chronic) ICD-10-CM: I10  ICD-9-CM: 401.9  Unknown - Present Yes        Type 2 diabetes mellitus (HCC) (Chronic) ICD-10-CM: E11.9  ICD-9-CM: 250.00  Unknown - Present Yes              A/P:    Acute embolic stroke  MRI brain showed acute to early subacute infarcts in the left cerebellar hemisphere, in the periventricular deep left frontoparietal white matter and likely additional areas of restricted diffusion in the right paramedian yariel.    +PFO on echo  On ASA, statin  Will need 4 week event monitor on DC, if no arrhythmia then will need PFO closure      Hypomagnesemia  Resolved       Hypertension  Continue metoprolol and ACE inhibitor  Goal BP <130/80     Type 2 diabetes mellitus:    Monitor, BGL controlled        Signed:  Hunter Monet NP

## 2020-04-11 NOTE — PROGRESS NOTES
Problem: Patient Education: Go to Patient Education Activity  Goal: Patient/Family Education  Outcome: Progressing Towards Goal     Problem: Pressure Injury - Risk of  Goal: *Prevention of pressure injury  Description: Document Dewey Scale and appropriate interventions in the flowsheet. Outcome: Progressing Towards Goal  Note: Pressure Injury Interventions:  Sensory Interventions: Assess changes in LOC    Moisture Interventions: Absorbent underpads, Check for incontinence Q2 hours and as needed, Limit adult briefs, Minimize layers    Activity Interventions: Increase time out of bed, Pressure redistribution bed/mattress(bed type), PT/OT evaluation    Mobility Interventions: HOB 30 degrees or less, Pressure redistribution bed/mattress (bed type), PT/OT evaluation    Nutrition Interventions: Document food/fluid/supplement intake    Friction and Shear Interventions: HOB 30 degrees or less                Problem: Patient Education: Go to Patient Education Activity  Goal: Patient/Family Education  Outcome: Progressing Towards Goal     Problem: Falls - Risk of  Goal: *Absence of Falls  Description: Document Jeanne Fall Risk and appropriate interventions in the flowsheet.   Outcome: Progressing Towards Goal  Note: Fall Risk Interventions:  Mobility Interventions: Assess mobility with egress test    Mentation Interventions: Bed/chair exit alarm    Medication Interventions: Bed/chair exit alarm, Patient to call before getting OOB, Teach patient to arise slowly    Elimination Interventions: Bed/chair exit alarm, Call light in reach, Patient to call for help with toileting needs, Stay With Me (per policy), Toilet paper/wipes in reach, Toileting schedule/hourly rounds, Urinal in reach    History of Falls Interventions: Bed/chair exit alarm, Door open when patient unattended, Investigate reason for fall         Problem: Patient Education: Go to Patient Education Activity  Goal: Patient/Family Education  Outcome: Progressing Towards Goal     Problem: Diabetes Self-Management  Goal: *Disease process and treatment process  Description: Define diabetes and identify own type of diabetes; list 3 options for treating diabetes. Outcome: Progressing Towards Goal  Goal: *Incorporating nutritional management into lifestyle  Description: Describe effect of type, amount and timing of food on blood glucose; list 3 methods for planning meals. Outcome: Progressing Towards Goal  Goal: *Incorporating physical activity into lifestyle  Description: State effect of exercise on blood glucose levels. Outcome: Progressing Towards Goal  Goal: *Developing strategies to promote health/change behavior  Description: Define the ABC's of diabetes; identify appropriate screenings, schedule and personal plan for screenings. Outcome: Progressing Towards Goal  Goal: *Using medications safely  Description: State effect of diabetes medications on diabetes; name diabetes medication taking, action and side effects. Outcome: Progressing Towards Goal  Goal: *Monitoring blood glucose, interpreting and using results  Description: Identify recommended blood glucose targets  and personal targets. Outcome: Progressing Towards Goal  Goal: *Prevention, detection, treatment of acute complications  Description: List symptoms of hyper- and hypoglycemia; describe how to treat low blood sugar and actions for lowering  high blood glucose level. Outcome: Progressing Towards Goal  Goal: *Prevention, detection and treatment of chronic complications  Description: Define the natural course of diabetes and describe the relationship of blood glucose levels to long term complications of diabetes.   Outcome: Progressing Towards Goal  Goal: *Developing strategies to address psychosocial issues  Description: Describe feelings about living with diabetes; identify support needed and support network  Outcome: Progressing Towards Goal     Problem: Patient Education: Go to Patient Education Activity  Goal: Patient/Family Education  Outcome: Progressing Towards Goal     Problem: Patient Education: Go to Patient Education Activity  Goal: Patient/Family Education  Outcome: Progressing Towards Goal     Problem: Nutrition Deficit  Goal: *Optimize nutritional status  Outcome: Progressing Towards Goal     Problem: Patient Education: Go to Patient Education Activity  Goal: Patient/Family Education  Outcome: Progressing Towards Goal     Problem: General Medical Care Plan  Goal: *Vital signs within specified parameters  Outcome: Progressing Towards Goal  Goal: *Labs within defined limits  Outcome: Progressing Towards Goal  Goal: *Absence of infection signs and symptoms  Description: Wash hand more often   Outcome: Progressing Towards Goal  Goal: *Optimal pain control at patient's stated goal  Outcome: Progressing Towards Goal  Goal: *Skin integrity maintained  Outcome: Progressing Towards Goal  Goal: *Fluid volume balance  Outcome: Progressing Towards Goal  Goal: *Optimize nutritional status  Outcome: Progressing Towards Goal  Goal: *Anxiety reduced or absent  Outcome: Progressing Towards Goal  Goal: *Progressive mobility and function (eg: ADL's)  Outcome: Progressing Towards Goal     Problem: Patient Education: Go to Patient Education Activity  Goal: Patient/Family Education  Outcome: Progressing Towards Goal     Problem: Patient Education: Go to Patient Education Activity  Goal: Patient/Family Education  Outcome: Progressing Towards Goal     Problem: Patient Education: Go to Patient Education Activity  Goal: Patient/Family Education  Outcome: Progressing Towards Goal     Problem: Patient Education: Go to Patient Education Activity  Goal: Patient/Family Education  Outcome: Progressing Towards Goal     Problem: Ischemic Stroke: Discharge Outcomes  Goal: *Verbalizes anxiety and depression are reduced or absent  Outcome: Progressing Towards Goal  Goal: *Verbalize understanding of risk factor modification(Stroke Metric)  Outcome: Progressing Towards Goal  Goal: *Hemodynamically stable  Outcome: Progressing Towards Goal  Goal: *Absence of aspiration pneumonia  Outcome: Progressing Towards Goal  Goal: *Aware of needed dietary changes  Outcome: Progressing Towards Goal  Goal: *Verbalize understanding of prescribed medications including anti-coagulants, anti-lipid, and/or anti-platelets(Stroke Metric)  Outcome: Progressing Towards Goal  Goal: *Tolerating diet  Outcome: Progressing Towards Goal  Goal: *Aware of follow-up diagnostics related to anticoagulants  Outcome: Progressing Towards Goal  Goal: *Ability to perform ADLs and demonstrates progressive mobility and function  Outcome: Progressing Towards Goal  Goal: *Absence of DVT(Stroke Metric)  Outcome: Progressing Towards Goal  Goal: *Absence of aspiration  Outcome: Progressing Towards Goal  Goal: *Optimal pain control at patient's stated goal  Outcome: Progressing Towards Goal  Goal: *Home safety concerns addressed  Outcome: Progressing Towards Goal  Goal: *Describes available resources and support systems  Outcome: Progressing Towards Goal  Goal: *Verbalizes understanding of activation of EMS(911) for stroke symptoms(Stroke Metric)  Outcome: Progressing Towards Goal  Goal: *Understands and describes signs and symptoms to report to providers(Stroke Metric)  Outcome: Progressing Towards Goal  Goal: *Neurolgocially stable (absence of additional neurological deficits)  Outcome: Progressing Towards Goal  Goal: *Verbalizes importance of follow-up with primary care physician(Stroke Metric)  Outcome: Progressing Towards Goal  Goal: *Smoking cessation discussed,if applicable(Stroke Metric)  Outcome: Progressing Towards Goal  Goal: *Depression screening completed(Stroke Metric)  Outcome: Progressing Towards Goal     Problem: Pain  Goal: *Control of Pain  Outcome: Progressing Towards Goal     Problem: Patient Education: Go to Patient Education Activity  Goal: Patient/Family Education  Outcome: Progressing Towards Goal     Problem: Patient Education: Go to Patient Education Activity  Goal: Patient/Family Education  Outcome: Progressing Towards Goal

## 2020-04-11 NOTE — PROGRESS NOTES
Reviewed notes prior to visit for spiritual concerns  Chaplains have followed since admission  Will continue to access needs    Clarice Villarreal, staff

## 2020-04-11 NOTE — PROGRESS NOTES
Problem: Patient Education: Go to Patient Education Activity  Goal: Patient/Family Education  Outcome: Progressing Towards Goal     Problem: Falls - Risk of  Goal: *Absence of Falls  Description: Document Pavel Lerner Fall Risk and appropriate interventions in the flowsheet.   Outcome: Progressing Towards Goal  Note: Fall Risk Interventions:  Mobility Interventions: Bed/chair exit alarm, OT consult for ADLs, PT Consult for mobility concerns    Mentation Interventions: Evaluate medications/consider consulting pharmacy, Bed/chair exit alarm, Door open when patient unattended    Medication Interventions: Bed/chair exit alarm, Teach patient to arise slowly    Elimination Interventions: Call light in reach, Elevated toilet seat    History of Falls Interventions: Door open when patient unattended, Consult care management for discharge planning

## 2020-04-12 LAB — GLUCOSE BLD STRIP.AUTO-MCNC: 101 MG/DL (ref 65–100)

## 2020-04-12 PROCEDURE — 74011250637 HC RX REV CODE- 250/637: Performed by: INTERNAL MEDICINE

## 2020-04-12 PROCEDURE — 97530 THERAPEUTIC ACTIVITIES: CPT

## 2020-04-12 PROCEDURE — 74011250637 HC RX REV CODE- 250/637: Performed by: FAMILY MEDICINE

## 2020-04-12 PROCEDURE — 65270000029 HC RM PRIVATE

## 2020-04-12 PROCEDURE — 74011250637 HC RX REV CODE- 250/637: Performed by: HOSPITALIST

## 2020-04-12 PROCEDURE — 82962 GLUCOSE BLOOD TEST: CPT

## 2020-04-12 PROCEDURE — 74011250636 HC RX REV CODE- 250/636: Performed by: INTERNAL MEDICINE

## 2020-04-12 RX ADMIN — LISINOPRIL 40 MG: 20 TABLET ORAL at 09:00

## 2020-04-12 RX ADMIN — Medication 400 MG: at 09:01

## 2020-04-12 RX ADMIN — ACETAMINOPHEN 650 MG: 325 TABLET, FILM COATED ORAL at 05:16

## 2020-04-12 RX ADMIN — GUAIFENESIN 100 MG: 100 SOLUTION ORAL at 17:51

## 2020-04-12 RX ADMIN — ASPIRIN 81 MG 81 MG: 81 TABLET ORAL at 09:01

## 2020-04-12 RX ADMIN — METFORMIN HYDROCHLORIDE 500 MG: 500 TABLET ORAL at 09:01

## 2020-04-12 RX ADMIN — ACETAMINOPHEN 650 MG: 325 TABLET, FILM COATED ORAL at 20:59

## 2020-04-12 RX ADMIN — DIPHENHYDRAMINE HYDROCHLORIDE 25 MG: 25 CAPSULE ORAL at 17:51

## 2020-04-12 RX ADMIN — METOPROLOL SUCCINATE 25 MG: 25 TABLET, FILM COATED, EXTENDED RELEASE ORAL at 09:00

## 2020-04-12 RX ADMIN — ACETAMINOPHEN 650 MG: 325 TABLET, FILM COATED ORAL at 14:58

## 2020-04-12 RX ADMIN — DIPHENHYDRAMINE HYDROCHLORIDE 25 MG: 25 CAPSULE ORAL at 05:16

## 2020-04-12 RX ADMIN — ATORVASTATIN CALCIUM 80 MG: 80 TABLET, FILM COATED ORAL at 20:59

## 2020-04-12 RX ADMIN — GUAIFENESIN 100 MG: 100 SOLUTION ORAL at 05:16

## 2020-04-12 RX ADMIN — ENOXAPARIN SODIUM 40 MG: 40 INJECTION SUBCUTANEOUS at 14:58

## 2020-04-12 NOTE — PROGRESS NOTES
Hospitalist Progress Note     Admit Date:  2019  9:07 AM   Name:  Calin Yen   Age:  53 y.o.  :  1975   MRN:  [id number]   PCP:  Esthela Fenton MD  Treatment Team: Attending Provider: Leandro Perez MD; Care Manager: Dk Simon LMSW; Utilization Review: Zahra Bush RN; Nurse Practitioner: Faith Aponte NP; Care Manager: Lukas Butts RN; Physical Therapy Assistant: Isai Pratt DPT; Occupational Therapist: Yuliana Hall    Subjective:   HPI and or CC:  52 yo AA male with PMH of DM, HTN admitted  for CVA affecting numerous areas of the brain. He was placed on ASA and statin. He has remained alert and oriented x3. Some movement to right side but unable to grasp with right hand. He is currently waiting on placement and this has remained difficult due to waiting on Medicaid. :  Alert and oriented x3. Voices no complaints today. Continues to wait for placement. He remains afebrile, no leukocytosis. NIH remains 7. On RA with saturations mid 90s. Continues to wait on SSI and Medicaid approval.      Objective:     Patient Vitals for the past 24 hrs:   Temp Pulse Resp BP SpO2   20 0800 97.9 °F (36.6 °C) 70 17 117/71 99 %   20 0400 98 °F (36.7 °C) 71 17 130/83 98 %   20 0000 97.3 °F (36.3 °C) 90 17 128/78 95 %   20 2000 97.8 °F (36.6 °C) 69 17 126/77 97 %   20 1600 98.2 °F (36.8 °C) 71 17 118/79 97 %   20 1200 97.5 °F (36.4 °C) 74 17 122/80 98 %     Oxygen Therapy  O2 Sat (%): 99 % (20 0800)  Pulse via Oximetry: 75 beats per minute (20 0400)  O2 Device: Room air (20 0400)  No intake or output data in the 24 hours ending 20 1005      REVIEW OF SYSTEMS: Comprehensive ROS performed and negative except as stated in HPI. Physical Examination:  General:       No acute distress    Lungs:          CTA bilaterally. Resp even and nonlabored  Heart:            S1S2 present without murmurs rubs gallops. RRR. No LE edema  Abdomen:    Soft, non tender, non distended. BS present  Extremities: No cyanosis. Left sided hemiparesis  Neurologic:  moves right hand and raises arm at elbow moderately, unable to squeeze my fingers left hand, mildly slurred speech.  PERRLA, strength 4/5 RUE/RLE. Data Review:  I have reviewed all labs, meds, telemetry events, and studies from the last 24 hours.     Recent Results (from the past 24 hour(s))   GLUCOSE, POC    Collection Time: 04/12/20  5:40 AM   Result Value Ref Range    Glucose (POC) 101 (H) 65 - 100 mg/dL        All Micro Results     None          Current Meds:  Current Facility-Administered Medications   Medication Dose Route Frequency    metFORMIN (GLUCOPHAGE) tablet 500 mg  500 mg Oral DAILY WITH BREAKFAST    magnesium oxide (MAG-OX) tablet 400 mg  400 mg Oral DAILY    acetaminophen (TYLENOL) tablet 650 mg  650 mg Oral Q4H PRN    diphenhydrAMINE (BENADRYL) capsule 25 mg  25 mg Oral Q6H PRN    acetaminophen (TYLENOL) tablet 650 mg  650 mg Oral Q8H    lip protectant (BLISTEX) ointment 1 Each  1 Each Topical PRN    metoprolol succinate (TOPROL-XL) XL tablet 25 mg  25 mg Oral DAILY    polyethylene glycol (MIRALAX) packet 17 g  17 g Oral DAILY PRN    guaiFENesin (ROBITUSSIN) 100 mg/5 mL oral liquid 100 mg  100 mg Oral Q4H PRN    hydrALAZINE (APRESOLINE) 20 mg/mL injection 20 mg  20 mg IntraVENous Q4H PRN    atorvastatin (LIPITOR) tablet 80 mg  80 mg Oral QHS    lisinopril (PRINIVIL, ZESTRIL) tablet 40 mg  40 mg Oral DAILY    sodium chloride (NS) flush 5-40 mL  5-40 mL IntraVENous PRN    ondansetron (ZOFRAN) injection 4 mg  4 mg IntraVENous Q4H PRN    aspirin chewable tablet 81 mg  81 mg Oral DAILY    enoxaparin (LOVENOX) injection 40 mg  40 mg SubCUTAneous Q24H    dextrose 40% (GLUTOSE) oral gel 1 Tube  15 g Oral PRN    glucagon (GLUCAGEN) injection 1 mg  1 mg IntraMUSCular PRN    dextrose (D50W) injection syrg 12.5-25 g  25-50 mL IntraVENous PRN       Diet:  DIET NUTRITIONAL SUPPLEMENTS  DIET DIABETIC CONSISTENT CARB    Other Studies (last 24 hours):  No results found. Assessment and Plan:     Hospital Problems as of 4/12/2020 Date Reviewed: 3/3/2017          Codes Class Noted - Resolved POA    Hypomagnesemia ICD-10-CM: E83.42  ICD-9-CM: 275.2  3/7/2020 - Present Unknown        PFO (patent foramen ovale) ICD-10-CM: Q21.1  ICD-9-CM: 745.5  12/17/2019 - Present Yes        Arrhythmia ICD-10-CM: I49.9  ICD-9-CM: 427.9  12/15/2019 - Present Yes        Hyperlipemia ICD-10-CM: E78.5  ICD-9-CM: 272.4  12/15/2019 - Present Yes        * (Principal) Acute embolic stroke ([facility] [address] GBY-49-YM: I63.9  ICD-9-CM: 434.11  12/12/2019 - Present Yes        Hypertension (Chronic) ICD-10-CM: I10  ICD-9-CM: 401.9  Unknown - Present Yes        Type 2 diabetes mellitus (HCC) (Chronic) ICD-10-CM: E11.9  ICD-9-CM: 250.00  Unknown - Present Yes              A/P:    Acute embolic stroke  MRI brain showed acute to early subacute infarcts in the left cerebellar hemisphere, in the periventricular deep left frontoparietal white matter and likely additional areas of restricted diffusion in the right paramedian yariel.    +PFO on echo  On ASA, statin  Will need 4 week event monitor on DC, if no arrhythmia then will need PFO closure      Hypomagnesemia  Resolved       Hypertension  Continue metoprolol and ACE inhibitor  Goal BP <130/80     Type 2 diabetes mellitus:    Monitor, BGL controlled        Signed:  Adithya Fermin NP

## 2020-04-12 NOTE — PROGRESS NOTES
Problem: Pressure Injury - Risk of  Goal: *Prevention of pressure injury  Description: Document Dewey Scale and appropriate interventions in the flowsheet. Outcome: Progressing Towards Goal  Note: Pressure Injury Interventions:  Sensory Interventions: Assess changes in LOC, Assess need for specialty bed    Moisture Interventions: Apply protective barrier, creams and emollients, Absorbent underpads, Offer toileting Q_hr, Moisture barrier    Activity Interventions: Assess need for specialty bed, PT/OT evaluation, Pressure redistribution bed/mattress(bed type)    Mobility Interventions: Assess need for specialty bed, PT/OT evaluation, Pressure redistribution bed/mattress (bed type)    Nutrition Interventions: Offer support with meals,snacks and hydration    Friction and Shear Interventions: HOB 30 degrees or less                Problem: Diabetes Self-Management  Goal: *Disease process and treatment process  Description: Define diabetes and identify own type of diabetes; list 3 options for treating diabetes.   Outcome: Progressing Towards Goal

## 2020-04-12 NOTE — PROGRESS NOTES
Problem: Mobility Impaired (Adult and Pediatric)  Goal: *Acute Goals and Plan of Care  Description  GOALS UPDATED ON RE-ASSESSMENT 4/1/2020 (advancements to goals in bold):  1. Patient will perform bed mobility with supervision and 0 verbal cues within 7 treatment days. 2. Patient will perform transfer bed to chair with SUPERVISION within 7 treatment days. 3. Patient will demonstrate fair+ dynamic balance throughout stance phase on L LE within 7 treatment days. 4. Patient will require STAND BY ASSIST throughout swing phase on (to advance) L LE within 7 treatment days. 5. Patient demonstrate 3+/5 strength in L LE hip flexion, hip abduction, and hip adduction within 7 treatment days. 6. Patient will ambulate 200ft+ with STAND BY ASSIST and least retrictive assistive device within 7 treatment days. 7. Patient will perform wheelchair mobility x150ft with MODIFIED INDEPENDENCE within 7 treatment days. 8. Mr. Kyung Cisse will be independent with wheelchair locking/unlocking mechanism as well as leg rest manipulation within 7 days to improve safety and independence. 9. Mr. Kyung Cisse will don/doff LUE sling for protection of L shoulder during transfers/gait within 7 treatment days. PHYSICAL THERAPY: Daily Note and PM 4/12/2020  INPATIENT: PT Visit Days : 2  Payor: MEDICAID PENDING / Plan: [employer] PENDING / Product Type: Medicaid /       NAME/AGE/GENDER: Alanna Boss is a 53 y.o. male   PRIMARY DIAGNOSIS: Acute ischemic stroke ([facility] [I63.9]  Cerebrovascular accident (CVA) due to embolism ([address] [X15.3] Acute embolic stroke ([address] Acute embolic stroke ([address]       ICD-10: Treatment Diagnosis:   · Difficulty in walking, Not elsewhere classified (R26.2)  · Hemiplegia and hemiparesis following cerebral infarction affecting left non-dominant side (S48.308)   Precaution/Allergies:  Patient has no known allergies. ASSESSMENT:     Mr. Kyung Cisse is a 53year old admitted R MCA CVA.    Treatment today included bed mobility with modA, transfer sit to stand with SBA, ambulation [id number]  with hemiwalker (and WC follow for safety) with CGA, Gait pattern includes, slow, step-through hemipalegic pattern using L adductor to advance L LE and sliding L foot forward with no jim with hyperextention slam on occasion, mildly narrowed stance and increased weight bearing on R LE with a 5min sitting rest. Patient continues to demonstrate improved mechanics on L LE. Quad strength and hip flex cueing continues to be factor through terminal stance to prevent hyperextension of L LE. Pt stating that he does not feel winded during session, but more LE fatigue. Static standing balance is good and dynamic standing balance is fair. Good participation today. This section established at most recent assessment   PROBLEM LIST (Impairments causing functional limitations):  1. Decreased Strength  2. Decreased ADL/Functional Activities  3. Decreased Transfer Abilities  4. Decreased Ambulation Ability/Technique  5. Decreased Balance  6. Increased Pain  7. Decreased Activity Tolerance  8. Increased Fatigue  9. Decreased Flexibility/Joint Mobility   INTERVENTIONS PLANNED: (Benefits and precautions of physical therapy have been discussed with the patient.)  1. Balance Exercise  2. Bed Mobility  3. Family Education  4. Gait Training  5. Home Exercise Program (HEP)  6. Manual Therapy  7. Neuromuscular Re-education/Strengthening  8. Range of Motion (ROM)  9. Therapeutic Activites  10. Therapeutic Exercise/Strengthening  11. Transfer Training     TREATMENT PLAN: Frequency/Duration: 3 times a week for duration of hospital stay  Rehabilitation Potential For Stated Goals: Good     REHAB RECOMMENDATIONS (at time of discharge pending progress):    Placement:   It is my opinion, based on this patient's performance to date, that Mr. Clovis Jordan may benefit from intensive therapy at an [address] [place] [address] after discharge due to a probable need for close medical supervision by a rehab physician, a probable need for multiple therapy disciplines and potential to make ongoing and sustainable functional improvement that is of practical value. .  Equipment:    TBD pending progress with therapy. HISTORY:   History of Present Injury/Illness (Reason for Referral):  Per H&P: \"Pt is a 52 y/o smoker with DM, HTN, who presented to ER with L leg and arm weakness, L facial droop, dysarthria. First noted L leg weakness late night 12/11 when he woke up to go to the bathroom. He was normal when he went to bed around 1030. Woke up this morning and had persistent weakness L leg and also now noted in L arm. EMS called. Noted to have slurred speech and L facial droop as well. Code S called in ER around 9am.  MRI with acute infarcts in L cerebellar hemisphere, deep frontoparietal white matter, and R paramedian yariel. Also noted old lacunar infarcts. No large vessel occlusion on CTA, but some stenosis noted. No hx afib, TIA, CVA. No CP, palpitations, SOB. \"  Past Medical History/Comorbidities:   Mr. Annie Sykes  has a past medical history of Acute ischemic stroke ([address] (12/12/2019), Acute pancreatitis (11/19/2014), Cerebrovascular accident (CVA) due to embolism ([address] (12/12/2019), Diabetes ([address] (2002), Diabetes ([address] Diabetes mellitus, and Hypertension. He also has no past medical history of Arthritis, Asthma, Autoimmune disease ([address] CAD (coronary artery disease), Cancer ([address] Chronic kidney disease, COPD, Dementia, Dementia ([address] Heart failure ([address] Ill-defined condition, Infectious disease, Liver disease, Other ill-defined conditions(799.89), Psychiatric disorder, PUD (peptic ulcer disease), Seizures ([address] or Sleep disorder. Mr. Annie Sykes  has a past surgical history that includes hx hernia repair and hx orthopaedic.   Social History/Living Environment:   Home Environment: Apartment  # Steps to Enter: 12  Rails to Enter: Yes  [employer] : Bilateral  One/Two Story Residence: One story  Living Alone: No  Support Systems: Spouse/Significant Other/Partner  Patient Expects to be Discharged to[de-identified] Unknown  Current DME Used/Available at Home: None  Tub or Shower Type: Tub/Shower combination  Prior Level of Function/Work/Activity:  Independent, lives with wife in 2nd story 1 level apartment. No recent falls. Number of Personal Factors/Comorbidities that affect the Plan of Care: 1-2: MODERATE COMPLEXITY   EXAMINATION:   Most Recent Physical Functioning:   Gross Assessment:                  Posture:     Balance:  Sitting: Intact  Standing: Impaired  Standing - Static: Good  Standing - Dynamic : Fair Bed Mobility:  Supine to Sit: Minimum assistance  Scooting: Supervision  Wheelchair Mobility:     Transfers:  Sit to Stand: Stand-by assistance; Moderate assistance  Gait:     Base of Support: Narrowed; Center of gravity altered;Shift to right  Step Length: Left shortened  Swing Pattern: Left asymmetrical  Gait Abnormalities: Hemiplegic  Distance (ft): 400 Feet (ft)  Assistive Device: Walker luke  Ambulation - Level of Assistance: Contact guard assistance      Body Structures Involved:  1. Nerves  2. Voice/Speech  3. Bones  4. Joints  5. Muscles Body Functions Affected:  1. Mental  2. Sensory/Pain  3. Neuromusculoskeletal  4. Movement Related Activities and Participation Affected:  1. General Tasks and Demands  2. Mobility  3. Self Care  4. Interpersonal Interactions and Relationships   Number of elements that affect the Plan of Care: 4+: HIGH COMPLEXITY   CLINICAL PRESENTATION:   Presentation: Evolving clinical presentation with changing clinical characteristics: MODERATE COMPLEXITY   CLINICAL DECISION MAKIN [facility] Mobility Inpatient Short Form  How much difficulty does the patient currently have. .. Unable A Lot A Little None   1. Turning over in bed (including adjusting bedclothes, sheets and blankets)? [] 1   [] 2   [] 3   [x] 4   2. Sitting down on and standing up from a chair with arms ( e.g., wheelchair, bedside commode, etc.)   [] 1   [] 2   [x] 3   [] 4   3. Moving from lying on back to sitting on the side of the bed? [] 1   [] 2   [] 3   [x] 4   How much help from another person does the patient currently need. .. Total A Lot A Little None   4. Moving to and from a bed to a chair (including a wheelchair)? [] 1   [] 2   [x] 3   [] 4   5. Need to walk in hospital room? [] 1   [] 2   [x] 3   [] 4   6. Climbing 3-5 steps with a railing? [] 1   [x] 2   [] 3   [] 4   © 2007, Trustees of [facility] [employer], under license to [employer]. All rights reserved      Score:  Initial: 13 Most Recent: 19 (Date: 4/10/2020)    Interpretation of Tool:  Represents activities that are increasingly more difficult (i.e. Bed mobility, Transfers, Gait). Medical Necessity:     · Patient is expected to demonstrate progress in   · strength, range of motion, balance, coordination, and functional technique  ·  to   · increase independence with all mobility. · .  Reason for Services/Other Comments:  · Patient continues to require skilled intervention due to   · medical complications and mobility deficits which impact his level of function, safety, and independence as indicated above. · .   Use of outcome tool(s) and clinical judgement create a POC that gives a: Questionable prediction of patient's progress: MODERATE COMPLEXITY        TREATMENT:      Pre-treatment Symptoms/Complaints:  none  Pain: Initial: 0/10 Post Session:  0/10     Therapeutic Activity: (    55 Minutes): Therapeutic activities including bed mobility training, transfer training, ambulation on level ground with cues for gait mechanics, wheelchair mobility,, and patient education  to improve mobility, strength and balance. Required moderate   to promote static and dynamic balance in standing and promote coordination of bilateral, lower extremity(s).      Braces/Orthotics/Lines/Etc: · Sling to LUE  Treatment/Session Assessment:    · Response to Treatment:  See above  · Interdisciplinary Collaboration:   o Physical Therapist  o Registered Nurse  · After treatment position/precautions:   o Supine in bed  o Call light within reach  o RN notified   · Compliance with Program/Exercises: Compliant all of the time  · Recommendations/Intent for next treatment session: \"Next visit will focus on advancements to more challenging activities and reduction in assistance provided\".     Total Treatment Duration:  PT Patient Time In/Time Out  Time In: 1305  Time Out: CHRISTIANNE Zhao

## 2020-04-12 NOTE — PROGRESS NOTES
Problem: Patient Education: Go to Patient Education Activity  Goal: Patient/Family Education  Outcome: Progressing Towards Goal     Problem: Pressure Injury - Risk of  Goal: *Prevention of pressure injury  Description: Document Dewey Scale and appropriate interventions in the flowsheet. Outcome: Progressing Towards Goal  Note: Pressure Injury Interventions:  Sensory Interventions: Assess changes in LOC    Moisture Interventions: Absorbent underpads, Check for incontinence Q2 hours and as needed, Limit adult briefs, Minimize layers    Activity Interventions: Increase time out of bed, Pressure redistribution bed/mattress(bed type), PT/OT evaluation    Mobility Interventions: HOB 30 degrees or less, Pressure redistribution bed/mattress (bed type), PT/OT evaluation    Nutrition Interventions: Document food/fluid/supplement intake    Friction and Shear Interventions: HOB 30 degrees or less                Problem: Patient Education: Go to Patient Education Activity  Goal: Patient/Family Education  Outcome: Progressing Towards Goal     Problem: Falls - Risk of  Goal: *Absence of Falls  Description: Document Jeanne Fall Risk and appropriate interventions in the flowsheet.   Outcome: Progressing Towards Goal  Note: Fall Risk Interventions:  Mobility Interventions: Bed/chair exit alarm, Communicate number of staff needed for ambulation/transfer, OT consult for ADLs, Patient to call before getting OOB, PT Consult for mobility concerns    Mentation Interventions: Evaluate medications/consider consulting pharmacy, Bed/chair exit alarm, Door open when patient unattended    Medication Interventions: Patient to call before getting OOB, Teach patient to arise slowly    Elimination Interventions: Bed/chair exit alarm, Call light in reach, Patient to call for help with toileting needs    History of Falls Interventions: Bed/chair exit alarm, Door open when patient unattended, Investigate reason for fall         Problem: Patient Education: Go to Patient Education Activity  Goal: Patient/Family Education  Outcome: Progressing Towards Goal     Problem: Diabetes Self-Management  Goal: *Disease process and treatment process  Description: Define diabetes and identify own type of diabetes; list 3 options for treating diabetes. Outcome: Progressing Towards Goal  Goal: *Incorporating nutritional management into lifestyle  Description: Describe effect of type, amount and timing of food on blood glucose; list 3 methods for planning meals. Outcome: Progressing Towards Goal  Goal: *Incorporating physical activity into lifestyle  Description: State effect of exercise on blood glucose levels. Outcome: Progressing Towards Goal  Goal: *Developing strategies to promote health/change behavior  Description: Define the ABC's of diabetes; identify appropriate screenings, schedule and personal plan for screenings. Outcome: Progressing Towards Goal  Goal: *Using medications safely  Description: State effect of diabetes medications on diabetes; name diabetes medication taking, action and side effects. Outcome: Progressing Towards Goal  Goal: *Monitoring blood glucose, interpreting and using results  Description: Identify recommended blood glucose targets  and personal targets. Outcome: Progressing Towards Goal  Goal: *Prevention, detection, treatment of acute complications  Description: List symptoms of hyper- and hypoglycemia; describe how to treat low blood sugar and actions for lowering  high blood glucose level. Outcome: Progressing Towards Goal  Goal: *Prevention, detection and treatment of chronic complications  Description: Define the natural course of diabetes and describe the relationship of blood glucose levels to long term complications of diabetes.   Outcome: Progressing Towards Goal  Goal: *Developing strategies to address psychosocial issues  Description: Describe feelings about living with diabetes; identify support needed and support network  Outcome: Progressing Towards Goal     Problem: Patient Education: Go to Patient Education Activity  Goal: Patient/Family Education  Outcome: Progressing Towards Goal     Problem: Patient Education: Go to Patient Education Activity  Goal: Patient/Family Education  Outcome: Progressing Towards Goal     Problem: Nutrition Deficit  Goal: *Optimize nutritional status  Outcome: Progressing Towards Goal     Problem: Patient Education: Go to Patient Education Activity  Goal: Patient/Family Education  Outcome: Progressing Towards Goal     Problem: General Medical Care Plan  Goal: *Vital signs within specified parameters  Outcome: Progressing Towards Goal  Goal: *Labs within defined limits  Outcome: Progressing Towards Goal  Goal: *Absence of infection signs and symptoms  Description: Wash hand more often   Outcome: Progressing Towards Goal  Goal: *Optimal pain control at patient's stated goal  Outcome: Progressing Towards Goal  Goal: *Skin integrity maintained  Outcome: Progressing Towards Goal  Goal: *Fluid volume balance  Outcome: Progressing Towards Goal  Goal: *Optimize nutritional status  Outcome: Progressing Towards Goal  Goal: *Anxiety reduced or absent  Outcome: Progressing Towards Goal  Goal: *Progressive mobility and function (eg: ADL's)  Outcome: Progressing Towards Goal     Problem: Patient Education: Go to Patient Education Activity  Goal: Patient/Family Education  Outcome: Progressing Towards Goal     Problem: Patient Education: Go to Patient Education Activity  Goal: Patient/Family Education  Outcome: Progressing Towards Goal     Problem: Patient Education: Go to Patient Education Activity  Goal: Patient/Family Education  Outcome: Progressing Towards Goal     Problem: Patient Education: Go to Patient Education Activity  Goal: Patient/Family Education  Outcome: Progressing Towards Goal     Problem: Ischemic Stroke: Discharge Outcomes  Goal: *Verbalizes anxiety and depression are reduced or absent  Outcome: Progressing Towards Goal  Goal: *Verbalize understanding of risk factor modification(Stroke Metric)  Outcome: Progressing Towards Goal  Goal: *Hemodynamically stable  Outcome: Progressing Towards Goal  Goal: *Absence of aspiration pneumonia  Outcome: Progressing Towards Goal  Goal: *Aware of needed dietary changes  Outcome: Progressing Towards Goal  Goal: *Verbalize understanding of prescribed medications including anti-coagulants, anti-lipid, and/or anti-platelets(Stroke Metric)  Outcome: Progressing Towards Goal  Goal: *Tolerating diet  Outcome: Progressing Towards Goal  Goal: *Aware of follow-up diagnostics related to anticoagulants  Outcome: Progressing Towards Goal  Goal: *Ability to perform ADLs and demonstrates progressive mobility and function  Outcome: Progressing Towards Goal  Goal: *Absence of DVT(Stroke Metric)  Outcome: Progressing Towards Goal  Goal: *Absence of aspiration  Outcome: Progressing Towards Goal  Goal: *Optimal pain control at patient's stated goal  Outcome: Progressing Towards Goal  Goal: *Home safety concerns addressed  Outcome: Progressing Towards Goal  Goal: *Describes available resources and support systems  Outcome: Progressing Towards Goal  Goal: *Verbalizes understanding of activation of EMS(911) for stroke symptoms(Stroke Metric)  Outcome: Progressing Towards Goal  Goal: *Understands and describes signs and symptoms to report to providers(Stroke Metric)  Outcome: Progressing Towards Goal  Goal: *Neurolgocially stable (absence of additional neurological deficits)  Outcome: Progressing Towards Goal  Goal: *Verbalizes importance of follow-up with primary care physician(Stroke Metric)  Outcome: Progressing Towards Goal  Goal: *Smoking cessation discussed,if applicable(Stroke Metric)  Outcome: Progressing Towards Goal  Goal: *Depression screening completed(Stroke Metric)  Outcome: Progressing Towards Goal     Problem: Pain  Goal: *Control of Pain  Outcome: Progressing Towards Goal     Problem: Patient Education: Go to Patient Education Activity  Goal: Patient/Family Education  Outcome: Progressing Towards Goal     Problem: Patient Education: Go to Patient Education Activity  Goal: Patient/Family Education  Outcome: Progressing Towards Goal

## 2020-04-13 LAB — GLUCOSE BLD STRIP.AUTO-MCNC: 117 MG/DL (ref 65–100)

## 2020-04-13 PROCEDURE — 74011250637 HC RX REV CODE- 250/637: Performed by: INTERNAL MEDICINE

## 2020-04-13 PROCEDURE — 82962 GLUCOSE BLOOD TEST: CPT

## 2020-04-13 PROCEDURE — 65270000029 HC RM PRIVATE

## 2020-04-13 PROCEDURE — 74011250636 HC RX REV CODE- 250/636: Performed by: INTERNAL MEDICINE

## 2020-04-13 PROCEDURE — 97116 GAIT TRAINING THERAPY: CPT | Performed by: PHYSICAL THERAPIST

## 2020-04-13 PROCEDURE — 74011250637 HC RX REV CODE- 250/637: Performed by: FAMILY MEDICINE

## 2020-04-13 PROCEDURE — 74011250637 HC RX REV CODE- 250/637: Performed by: HOSPITALIST

## 2020-04-13 RX ADMIN — ENOXAPARIN SODIUM 40 MG: 40 INJECTION SUBCUTANEOUS at 13:25

## 2020-04-13 RX ADMIN — ASPIRIN 81 MG 81 MG: 81 TABLET ORAL at 07:47

## 2020-04-13 RX ADMIN — ACETAMINOPHEN 650 MG: 325 TABLET, FILM COATED ORAL at 13:25

## 2020-04-13 RX ADMIN — Medication 400 MG: at 07:47

## 2020-04-13 RX ADMIN — METFORMIN HYDROCHLORIDE 500 MG: 500 TABLET ORAL at 07:47

## 2020-04-13 RX ADMIN — DIPHENHYDRAMINE HYDROCHLORIDE 25 MG: 25 CAPSULE ORAL at 16:38

## 2020-04-13 RX ADMIN — ATORVASTATIN CALCIUM 80 MG: 80 TABLET, FILM COATED ORAL at 20:46

## 2020-04-13 RX ADMIN — LISINOPRIL 40 MG: 20 TABLET ORAL at 07:47

## 2020-04-13 RX ADMIN — GUAIFENESIN 100 MG: 100 SOLUTION ORAL at 16:39

## 2020-04-13 RX ADMIN — ACETAMINOPHEN 650 MG: 325 TABLET, FILM COATED ORAL at 05:16

## 2020-04-13 RX ADMIN — ACETAMINOPHEN 650 MG: 325 TABLET, FILM COATED ORAL at 20:46

## 2020-04-13 RX ADMIN — METOPROLOL SUCCINATE 25 MG: 25 TABLET, FILM COATED, EXTENDED RELEASE ORAL at 07:47

## 2020-04-13 NOTE — PROGRESS NOTES
Problem: Mobility Impaired (Adult and Pediatric)  Goal: *Acute Goals and Plan of Care  Description  GOALS UPDATED ON RE-ASSESSMENT 4/1/2020 (advancements to goals in bold):  1. Patient will perform bed mobility with supervision and 0 verbal cues within 7 treatment days. 2. Patient will perform transfer bed to chair with SUPERVISION within 7 treatment days. 3. Patient will demonstrate fair+ dynamic balance throughout stance phase on L LE within 7 treatment days. 4. Patient will require STAND BY ASSIST throughout swing phase on (to advance) L LE within 7 treatment days. 5. Patient demonstrate 3+/5 strength in L LE hip flexion, hip abduction, and hip adduction within 7 treatment days. 6. Patient will ambulate 200ft+ with STAND BY ASSIST and least retrictive assistive device within 7 treatment days. 7. Patient will perform wheelchair mobility x150ft with MODIFIED INDEPENDENCE within 7 treatment days. 8. Mr. Ellis Nair will be independent with wheelchair locking/unlocking mechanism as well as leg rest manipulation within 7 days to improve safety and independence. 9. Mr. Ellis Nair will don/doff LUE sling for protection of L shoulder during transfers/gait within 7 treatment days. PHYSICAL THERAPY: Daily Note and PM 4/13/2020  INPATIENT: PT Visit Days : 3  Payor: MEDICAID PENDING / Plan: Keri Rizvi PENDING / Product Type: Medicaid /       NAME/AGE/GENDER: Cristina Enriquez is a 53 y.o. male   PRIMARY DIAGNOSIS: Acute ischemic stroke ([address] [I63.9]  Cerebrovascular accident (CVA) due to embolism ([address] [M99.9] Acute embolic stroke ([address] Acute embolic stroke ([address]       ICD-10: Treatment Diagnosis:   · Difficulty in walking, Not elsewhere classified (R26.2)  · Hemiplegia and hemiparesis following cerebral infarction affecting left non-dominant side (U07.851)   Precaution/Allergies:  Patient has no known allergies. ASSESSMENT:     Mr. Ellis Nair is a 53year old admitted R MCA CVA.    Treatment today included bed mobility with Vignesh, transfer sit to stand with CGA, ambulation 400  with hemiwalker (and WC follow for safety) with CGA. Gait pattern includes, slow, step-through hemipalegic pattern using L adductor to advance L LE and sliding L foot forward with no jim with hyperextention slam on occasion, mildly narrowed stance and increased weight bearing on R LE. Pnt required frequent visual/verbal/manual cues to pair R Keanu-walker use with left stride and frequently moved the hemiwalker first, followed by compensatory left hip circumduction and external rotation. Static standing balance remains good and dynamic standing balance remains fair. Good participation today. This section established at most recent assessment   PROBLEM LIST (Impairments causing functional limitations):  1. Decreased Strength  2. Decreased ADL/Functional Activities  3. Decreased Transfer Abilities  4. Decreased Ambulation Ability/Technique  5. Decreased Balance  6. Increased Pain  7. Decreased Activity Tolerance  8. Increased Fatigue  9. Decreased Flexibility/Joint Mobility   INTERVENTIONS PLANNED: (Benefits and precautions of physical therapy have been discussed with the patient.)  1. Balance Exercise  2. Bed Mobility  3. Family Education  4. Gait Training  5. Home Exercise Program (HEP)  6. Manual Therapy  7. Neuromuscular Re-education/Strengthening  8. Range of Motion (ROM)  9. Therapeutic Activites  10. Therapeutic Exercise/Strengthening  11. Transfer Training     TREATMENT PLAN: Frequency/Duration: 3 times a week for duration of hospital stay  Rehabilitation Potential For Stated Goals: Good     REHAB RECOMMENDATIONS (at time of discharge pending progress):    Placement:   It is my opinion, based on this patient's performance to date, that Mr. Kathleen Jensen may benefit from intensive therapy at an [address] [place] [address] after discharge due to a probable need for close medical supervision by a rehab physician, a probable need for multiple therapy disciplines and potential to make ongoing and sustainable functional improvement that is of practical value. .  Equipment:    TBD pending progress with therapy. HISTORY:   History of Present Injury/Illness (Reason for Referral):  Per H&P: \"Pt is a 52 y/o smoker with DM, HTN, who presented to ER with L leg and arm weakness, L facial droop, dysarthria. First noted L leg weakness late night 12/11 when he woke up to go to the bathroom. He was normal when he went to bed around 1030. Woke up this morning and had persistent weakness L leg and also now noted in L arm. EMS called. Noted to have slurred speech and L facial droop as well. Code S called in ER around 9am.  MRI with acute infarcts in L cerebellar hemisphere, deep frontoparietal white matter, and R paramedian yariel. Also noted old lacunar infarcts. No large vessel occlusion on CTA, but some stenosis noted. No hx afib, TIA, CVA. No CP, palpitations, SOB. \"  Past Medical History/Comorbidities:   Mr. Kyung Cisse  has a past medical history of Acute ischemic stroke ([address] (12/12/2019), Acute pancreatitis (11/19/2014), Cerebrovascular accident (CVA) due to embolism ([address] (12/12/2019), Diabetes ([address] (2002), Diabetes ([address] Diabetes mellitus, and Hypertension. He also has no past medical history of Arthritis, Asthma, Autoimmune disease ([address] CAD (coronary artery disease), Cancer ([address] Chronic kidney disease, COPD, Dementia, Dementia ([address] Heart failure ([address] Ill-defined condition, Infectious disease, Liver disease, Other ill-defined conditions(799.89), Psychiatric disorder, PUD (peptic ulcer disease), Seizures ([address] or Sleep disorder. Mr. Kyung Cisse  has a past surgical history that includes hx hernia repair and hx orthopaedic.   Social History/Living Environment:   Home Environment: Apartment  # Steps to Enter: 12  Rails to Enter: Yes  [employer] : Bilateral  One/Two Story Residence: One story  Living Alone: No  Support Systems: Spouse/Significant Other/Partner  Patient Expects to be Discharged to[de-identified] Unknown  Current DME Used/Available at Home: None  Tub or Shower Type: Tub/Shower combination  Prior Level of Function/Work/Activity:  Independent, lives with wife in 2nd story 1 level apartment. No recent falls. Number of Personal Factors/Comorbidities that affect the Plan of Care: 1-2: MODERATE COMPLEXITY   EXAMINATION:   Most Recent Physical Functioning:   Gross Assessment:                  Posture:     Balance:  Sitting: Impaired  Standing - Static: Good  Standing - Dynamic : Fair Bed Mobility:  Supine to Sit: Minimum assistance  Scooting: Supervision  Wheelchair Mobility:     Transfers:  Sit to Stand: Contact guard assistance  Gait:     Base of Support: Narrowed  Speed/Clotilde: Slow  Step Length: Left shortened  Swing Pattern: Left asymmetrical  Gait Abnormalities: Hemiplegic  Distance (ft): 400 Feet (ft)  Assistive Device: Walker luke  Duration: 27 Minutes      Body Structures Involved:  1. Nerves  2. Voice/Speech  3. Bones  4. Joints  5. Muscles Body Functions Affected:  1. Mental  2. Sensory/Pain  3. Neuromusculoskeletal  4. Movement Related Activities and Participation Affected:  1. General Tasks and Demands  2. Mobility  3. Self Care  4. Interpersonal Interactions and Relationships   Number of elements that affect the Plan of Care: 4+: HIGH COMPLEXITY   CLINICAL PRESENTATION:   Presentation: Evolving clinical presentation with changing clinical characteristics: MODERATE COMPLEXITY   CLINICAL DECISION MAKIN [facility] Short Form  How much difficulty does the patient currently have. .. Unable A Lot A Little None   1. Turning over in bed (including adjusting bedclothes, sheets and blankets)? [] 1   [] 2   [] 3   [x] 4   2. Sitting down on and standing up from a chair with arms ( e.g., wheelchair, bedside commode, etc.)   [] 1   [] 2   [x] 3   [] 4   3.   Moving from lying on back to sitting on the side of the bed? [] 1   [] 2   [] 3   [x] 4   How much help from another person does the patient currently need. .. Total A Lot A Little None   4. Moving to and from a bed to a chair (including a wheelchair)? [] 1   [] 2   [x] 3   [] 4   5. Need to walk in hospital room? [] 1   [] 2   [x] 3   [] 4   6. Climbing 3-5 steps with a railing? [] 1   [x] 2   [] 3   [] 4   © 2007, Trustees of [address] [place] [address] [address] [id number], under license to [employer]. All rights reserved      Score:  Initial: 13 Most Recent: 19 (Date: 4/10/2020)    Interpretation of Tool:  Represents activities that are increasingly more difficult (i.e. Bed mobility, Transfers, Gait). Medical Necessity:     · Patient is expected to demonstrate progress in   · strength, range of motion, balance, coordination, and functional technique  ·  to   · increase independence with all mobility. · .  Reason for Services/Other Comments:  · Patient continues to require skilled intervention due to   · medical complications and mobility deficits which impact his level of function, safety, and independence as indicated above. · .   Use of outcome tool(s) and clinical judgement create a POC that gives a: Questionable prediction of patient's progress: MODERATE COMPLEXITY        TREATMENT:      Pre-treatment Symptoms/Complaints:  \" I haven't walked yet today\"  Pain: Initial: 0/10 Post Session:  0/10     Therapeutic Activity: (     Minutes): Therapeutic activities including bed mobility training, transfer training, ambulation on level ground with cues for gait mechanics, wheelchair mobility,, and patient education  to improve mobility, strength and balance. Required moderate   to promote static and dynamic balance in standing and promote coordination of bilateral, lower extremity(s). Gait Training (27 Minutes):  Gait training to improve and/or restore physical functioning as related to mobility, strength, balance and coordination.   Ambulated 400 Feet (ft) with   using a Walker luke and required verbal/visual/tactile cues related to their stance phase, stride length and use of hemiwalker in coordination with hemiparetic side. to promote proper body alignment, promote proper body posture and promote proper body mechanics. Braces/Orthotics/Lines/Etc:   · Sling to LUE  Treatment/Session Assessment:    · Response to Treatment:  See above  · Interdisciplinary Collaboration:   o Physical Therapist  o Registered Nurse  · After treatment position/precautions:   o Supine in bed  o Call light within reach  o RN notified   · Compliance with Program/Exercises: Compliant all of the time  · Recommendations/Intent for next treatment session: \"Next visit will focus on advancements to more challenging activities and reduction in assistance provided\".     Total Treatment Duration:  PT Patient Time In/Time Out  Time In: 1335  Time Out: 178 [facility] [place]

## 2020-04-13 NOTE — PROGRESS NOTES
Problem: Pressure Injury - Risk of  Goal: *Prevention of pressure injury  Description: Document Dewey Scale and appropriate interventions in the flowsheet. Outcome: Progressing Towards Goal  Note: Pressure Injury Interventions:  Sensory Interventions: Assess changes in LOC, Check visual cues for pain, Discuss PT/OT consult with provider, Pad between skin to skin, Pressure redistribution bed/mattress (bed type)    Moisture Interventions: Absorbent underpads, Apply protective barrier, creams and emollients, Check for incontinence Q2 hours and as needed, Limit adult briefs    Activity Interventions: Increase time out of bed, Pressure redistribution bed/mattress(bed type), PT/OT evaluation    Mobility Interventions: HOB 30 degrees or less, Pressure redistribution bed/mattress (bed type), PT/OT evaluation    Nutrition Interventions: Document food/fluid/supplement intake, Offer support with meals,snacks and hydration    Friction and Shear Interventions: HOB 30 degrees or less                Problem: Patient Education: Go to Patient Education Activity  Goal: Patient/Family Education  Outcome: Progressing Towards Goal     Problem: Falls - Risk of  Goal: *Absence of Falls  Description: Document Jeanne Fall Risk and appropriate interventions in the flowsheet.   Outcome: Progressing Towards Goal  Note: Fall Risk Interventions:  Mobility Interventions: Bed/chair exit alarm, OT consult for ADLs, Patient to call before getting OOB, PT Consult for mobility concerns    Mentation Interventions: Bed/chair exit alarm    Medication Interventions: Bed/chair exit alarm, Evaluate medications/consider consulting pharmacy, Patient to call before getting OOB, Teach patient to arise slowly    Elimination Interventions: Bed/chair exit alarm, Call light in reach, Patient to call for help with toileting needs, Stay With Me (per policy)    History of Falls Interventions: Bed/chair exit alarm, Door open when patient unattended, Investigate reason for fall         Problem: Patient Education: Go to Patient Education Activity  Goal: Patient/Family Education  Outcome: Progressing Towards Goal     Problem: Diabetes Self-Management  Goal: *Disease process and treatment process  Description: Define diabetes and identify own type of diabetes; list 3 options for treating diabetes. Outcome: Progressing Towards Goal  Goal: *Incorporating nutritional management into lifestyle  Description: Describe effect of type, amount and timing of food on blood glucose; list 3 methods for planning meals. Outcome: Progressing Towards Goal  Goal: *Incorporating physical activity into lifestyle  Description: State effect of exercise on blood glucose levels. Outcome: Progressing Towards Goal  Goal: *Developing strategies to promote health/change behavior  Description: Define the ABC's of diabetes; identify appropriate screenings, schedule and personal plan for screenings. Outcome: Progressing Towards Goal  Goal: *Using medications safely  Description: State effect of diabetes medications on diabetes; name diabetes medication taking, action and side effects. Outcome: Progressing Towards Goal  Goal: *Monitoring blood glucose, interpreting and using results  Description: Identify recommended blood glucose targets  and personal targets. Outcome: Progressing Towards Goal  Goal: *Prevention, detection, treatment of acute complications  Description: List symptoms of hyper- and hypoglycemia; describe how to treat low blood sugar and actions for lowering  high blood glucose level. Outcome: Progressing Towards Goal  Goal: *Prevention, detection and treatment of chronic complications  Description: Define the natural course of diabetes and describe the relationship of blood glucose levels to long term complications of diabetes.   Outcome: Progressing Towards Goal  Goal: *Developing strategies to address psychosocial issues  Description: Describe feelings about living with diabetes; identify support needed and support network  Outcome: Progressing Towards Goal     Problem: Patient Education: Go to Patient Education Activity  Goal: Patient/Family Education  Outcome: Progressing Towards Goal     Problem: General Medical Care Plan  Goal: *Vital signs within specified parameters  Outcome: Progressing Towards Goal  Goal: *Labs within defined limits  Outcome: Progressing Towards Goal  Goal: *Absence of infection signs and symptoms  Description: Wash hand more often   Outcome: Progressing Towards Goal  Goal: *Optimal pain control at patient's stated goal  Outcome: Progressing Towards Goal  Goal: *Skin integrity maintained  Outcome: Progressing Towards Goal  Goal: *Fluid volume balance  Outcome: Progressing Towards Goal  Goal: *Optimize nutritional status  Outcome: Progressing Towards Goal  Goal: *Anxiety reduced or absent  Outcome: Progressing Towards Goal  Goal: *Progressive mobility and function (eg: ADL's)  Outcome: Progressing Towards Goal     Problem: Patient Education: Go to Patient Education Activity  Goal: Patient/Family Education  Outcome: Progressing Towards Goal     Problem: Ischemic Stroke: Discharge Outcomes  Goal: *Verbalizes anxiety and depression are reduced or absent  Outcome: Progressing Towards Goal  Goal: *Verbalize understanding of risk factor modification(Stroke Metric)  Outcome: Progressing Towards Goal  Goal: *Hemodynamically stable  Outcome: Progressing Towards Goal  Goal: *Absence of aspiration pneumonia  Outcome: Progressing Towards Goal  Goal: *Aware of needed dietary changes  Outcome: Progressing Towards Goal  Goal: *Verbalize understanding of prescribed medications including anti-coagulants, anti-lipid, and/or anti-platelets(Stroke Metric)  Outcome: Progressing Towards Goal  Goal: *Tolerating diet  Outcome: Progressing Towards Goal  Goal: *Aware of follow-up diagnostics related to anticoagulants  Outcome: Progressing Towards Goal  Goal: *Ability to perform ADLs and demonstrates progressive mobility and function  Outcome: Progressing Towards Goal  Goal: *Absence of DVT(Stroke Metric)  Outcome: Progressing Towards Goal  Goal: *Absence of aspiration  Outcome: Progressing Towards Goal  Goal: *Optimal pain control at patient's stated goal  Outcome: Progressing Towards Goal  Goal: *Home safety concerns addressed  Outcome: Progressing Towards Goal  Goal: *Describes available resources and support systems  Outcome: Progressing Towards Goal  Goal: *Verbalizes understanding of activation of EMS(911) for stroke symptoms(Stroke Metric)  Outcome: Progressing Towards Goal  Goal: *Understands and describes signs and symptoms to report to providers(Stroke Metric)  Outcome: Progressing Towards Goal  Goal: *Neurolgocially stable (absence of additional neurological deficits)  Outcome: Progressing Towards Goal  Goal: *Smoking cessation discussed,if applicable(Stroke Metric)  Outcome: Progressing Towards Goal  Goal: *Depression screening completed(Stroke Metric)  Outcome: Progressing Towards Goal     Problem: Pain  Goal: *Control of Pain  Outcome: Progressing Towards Goal     Problem: Patient Education: Go to Patient Education Activity  Goal: Patient/Family Education  Outcome: Progressing Towards Goal

## 2020-04-13 NOTE — PROGRESS NOTES
's follow-up visit attempted. Mr. Yvette Lindsey was asleep and I did not wake him. Chaplains remain available for support.       Dontrell Babin 66  Board Certified

## 2020-04-13 NOTE — PROGRESS NOTES
Nutrition Assessment for:   Follow up    Assessment: Pt is 54 yo male who presented with weakness, facial drooping and slurred speech. He has history of CVA, DM type 2, HTN, pancreatitis, GERD and a smoker. He remains hospitalized awaiting medicaid eligibility. DIET NUTRITIONAL SUPPLEMENTS All Meals; Glucerna Shake  DIET DIABETIC CONSISTENT CARB Mechanical Soft    Insufficient intake data. Unable to assess if needs are met. Pt reports eating 75% of breakfast this morning and states that he is consuming his glucerna shakes with each meal.  Pt reports that he is eating fair. He denies any barriers to PO intake. He reports 1 BM yesterday. It was noted that pt had written on his white board in room \"no straws. \"  Observed pt with a drink with a straw. Anthropometrics:  Height: 5' 6\" (167.6 cm), Weight: 95.3 kg (210 lb),  , Body mass index is 33.89 kg/m². BMI class of obese. Weight is from 12/12/19. Macronutrient needs: MSJ (using CBW (Current body weight) unknown 95.3 kg)  EER: 2130 kcal/day (22 kcals/kg )  EPR: 106 g/day (1.1 g/kg ) (20%)    Nutrition Intervention:  Meals and snacks: Continue current diet. no straws. Medical food supplement therapy: Commercial beverage- Continue glucerna shakes TID   RN to request pt be weighed during PT today. Coordination of nutrition care by a Nutrition Professional: Shamika Clemons RN   Discussed pt with SLP, Victor Manuel Ball and Yoon Dodson, to discuss whether pt can have straws with liquids. Per Victor Manuel Ball, per last MBS, pt is not to have straws at this time. Will add to his diet order. Discharge Nutrition Plan: Too soon to determine.     Fe Bowser, Montrell Johnathon [address], [address], [address] [place] [address], [facility] [address]

## 2020-04-13 NOTE — PROGRESS NOTES
Problem: Patient Education: Go to Patient Education Activity  Goal: Patient/Family Education  Outcome: Progressing Towards Goal     Problem: Pressure Injury - Risk of  Goal: *Prevention of pressure injury  Description: Document Dewey Scale and appropriate interventions in the flowsheet. Outcome: Progressing Towards Goal  Note: Pressure Injury Interventions:  Sensory Interventions: Assess changes in LOC, Check visual cues for pain, Discuss PT/OT consult with provider, Pad between skin to skin, Pressure redistribution bed/mattress (bed type)    Moisture Interventions: Absorbent underpads, Apply protective barrier, creams and emollients, Check for incontinence Q2 hours and as needed, Limit adult briefs, Maintain skin hydration (lotion/cream), Minimize layers, Moisture barrier, Offer toileting Q_hr    Activity Interventions: Increase time out of bed, Pressure redistribution bed/mattress(bed type), PT/OT evaluation    Mobility Interventions: HOB 30 degrees or less, Pressure redistribution bed/mattress (bed type), PT/OT evaluation, Turn and reposition approx. every two hours(pillow and wedges)    Nutrition Interventions: Document food/fluid/supplement intake    Friction and Shear Interventions: HOB 30 degrees or less                Problem: Patient Education: Go to Patient Education Activity  Goal: Patient/Family Education  Outcome: Progressing Towards Goal     Problem: Falls - Risk of  Goal: *Absence of Falls  Description: Document Jeanne Fall Risk and appropriate interventions in the flowsheet.   Outcome: Progressing Towards Goal  Note: Fall Risk Interventions:  Mobility Interventions: Bed/chair exit alarm, Communicate number of staff needed for ambulation/transfer, Patient to call before getting OOB, Utilize walker, cane, or other assistive device, Strengthening exercises (ROM-active/passive)    Mentation Interventions: Bed/chair exit alarm    Medication Interventions: Bed/chair exit alarm, Patient to call before getting OOB, Teach patient to arise slowly    Elimination Interventions: Bed/chair exit alarm, Call light in reach, Patient to call for help with toileting needs, Toilet paper/wipes in reach, Toileting schedule/hourly rounds    History of Falls Interventions: Bed/chair exit alarm, Door open when patient unattended         Problem: Patient Education: Go to Patient Education Activity  Goal: Patient/Family Education  Outcome: Progressing Towards Goal     Problem: Nutrition Deficit  Goal: *Optimize nutritional status  Outcome: Progressing Towards Goal     Problem: General Medical Care Plan  Goal: *Vital signs within specified parameters  Outcome: Progressing Towards Goal  Goal: *Labs within defined limits  Outcome: Progressing Towards Goal  Goal: *Absence of infection signs and symptoms  Description: Wash hand more often   Outcome: Progressing Towards Goal  Goal: *Optimal pain control at patient's stated goal  Outcome: Progressing Towards Goal  Goal: *Skin integrity maintained  Outcome: Progressing Towards Goal  Goal: *Fluid volume balance  Outcome: Progressing Towards Goal  Goal: *Optimize nutritional status  Outcome: Progressing Towards Goal  Goal: *Anxiety reduced or absent  Outcome: Progressing Towards Goal  Goal: *Progressive mobility and function (eg: ADL's)  Outcome: Progressing Towards Goal     Problem: Patient Education: Go to Patient Education Activity  Goal: Patient/Family Education  Outcome: Progressing Towards Goal     Problem: Patient Education: Go to Patient Education Activity  Goal: Patient/Family Education  Outcome: Progressing Towards Goal     Problem: Patient Education: Go to Patient Education Activity  Goal: Patient/Family Education  Outcome: Progressing Towards Goal     Problem: Patient Education: Go to Patient Education Activity  Goal: Patient/Family Education  Outcome: Progressing Towards Goal     Problem: Pain  Goal: *Control of Pain  Outcome: Progressing Towards Goal     Problem: Patient Education: Go to Patient Education Activity  Goal: Patient/Family Education  Outcome: Progressing Towards Goal

## 2020-04-13 NOTE — PROGRESS NOTES
Hospitalist Progress Note     Admit Date:  2019  9:07 AM   Name:  Mikey Stephen   Age:  53 y.o.  :  1975   MRN:  [id number]   PCP:  Chuck Edwards MD  Treatment Team: Attending Provider: Ivette Dawson MD; Care Manager: Gillian Montero [facility] [place]; Utilization Review: Danette Seip, RN; Nurse Practitioner: Sima Blanton NP; Care Manager: Elio Cole RN; Primary Nurse: Alison Olson RN; Physical Therapist: Naomi Villalobos Charge Nurse: Jacqueline Louie Occupational Therapist: Marylen Fife, OT    Subjective:   HPI and or CC:  54 yo AA male with PMH of DM, HTN admitted  for CVA affecting numerous areas of the brain. He was placed on ASA and statin. He has remained alert and oriented x3. Some movement to right side but unable to grasp with right hand. He is currently waiting on placement and this has remained difficult due to waiting on Medicaid. : On RA with saturations mid 90s. Continues to wait on SSI and Medicaid approval.  Alert and oriented x3. Voices no complaints today. Continues to wait for placement. He remains afebrile, no leukocytosis. NIH remains 7. Objective:     Patient Vitals for the past 24 hrs:   Temp Pulse Resp BP SpO2   20 1200 98 °F (36.7 °C) 80 19 125/81 93 %   20 0800 97.9 °F (36.6 °C) 78 18 118/84 98 %   20 0400 97.7 °F (36.5 °C) 74 17 138/82 98 %   20 0000 98 °F (36.7 °C) 79 17 142/81 98 %   20 2000 97.9 °F (36.6 °C) 77 17 135/89 98 %   20 1600 98.3 °F (36.8 °C) 77 17 113/73 97 %     Oxygen Therapy  O2 Sat (%): 93 % (20 1200)  Pulse via Oximetry: 75 beats per minute (20 0400)  O2 Device: Room air (20 0400)  No intake or output data in the 24 hours ending 20 1243      REVIEW OF SYSTEMS: Comprehensive ROS performed and negative except as stated in HPI. Physical Examination:  General:       No acute distress    Lungs:          CTA bilaterally.  Resp even and nonlabored  Heart:            S1S2 present without murmurs rubs gallops. RRR. No LE edema  Abdomen:    Soft, non tender, non distended. BS present  Extremities: No cyanosis. Left sided hemiparesis  Neurologic:  moves right hand and raises arm at elbow moderately, unable to squeeze my fingers left hand, mildly slurred speech.  PERRLA, strength 4/5 RUE/RLE. Data Review:  I have reviewed all labs, meds, telemetry events, and studies from the last 24 hours.     Recent Results (from the past 24 hour(s))   GLUCOSE, POC    Collection Time: 04/13/20  6:41 AM   Result Value Ref Range    Glucose (POC) 117 (H) 65 - 100 mg/dL        All Micro Results     None          Current Meds:  Current Facility-Administered Medications   Medication Dose Route Frequency    metFORMIN (GLUCOPHAGE) tablet 500 mg  500 mg Oral DAILY WITH BREAKFAST    magnesium oxide (MAG-OX) tablet 400 mg  400 mg Oral DAILY    acetaminophen (TYLENOL) tablet 650 mg  650 mg Oral Q4H PRN    diphenhydrAMINE (BENADRYL) capsule 25 mg  25 mg Oral Q6H PRN    acetaminophen (TYLENOL) tablet 650 mg  650 mg Oral Q8H    lip protectant (BLISTEX) ointment 1 Each  1 Each Topical PRN    metoprolol succinate (TOPROL-XL) XL tablet 25 mg  25 mg Oral DAILY    polyethylene glycol (MIRALAX) packet 17 g  17 g Oral DAILY PRN    guaiFENesin (ROBITUSSIN) 100 mg/5 mL oral liquid 100 mg  100 mg Oral Q4H PRN    hydrALAZINE (APRESOLINE) 20 mg/mL injection 20 mg  20 mg IntraVENous Q4H PRN    atorvastatin (LIPITOR) tablet 80 mg  80 mg Oral QHS    lisinopril (PRINIVIL, ZESTRIL) tablet 40 mg  40 mg Oral DAILY    sodium chloride (NS) flush 5-40 mL  5-40 mL IntraVENous PRN    ondansetron (ZOFRAN) injection 4 mg  4 mg IntraVENous Q4H PRN    aspirin chewable tablet 81 mg  81 mg Oral DAILY    enoxaparin (LOVENOX) injection 40 mg  40 mg SubCUTAneous Q24H    dextrose 40% (GLUTOSE) oral gel 1 Tube  15 g Oral PRN    glucagon (GLUCAGEN) injection 1 mg  1 mg IntraMUSCular PRN    dextrose (D50W) injection syrg 12.5-25 g  25-50 mL IntraVENous PRN       Diet:  DIET NUTRITIONAL SUPPLEMENTS  DIET DIABETIC CONSISTENT CARB    Other Studies (last 24 hours):  No results found. Assessment and Plan:     Hospital Problems as of 4/13/2020 Date Reviewed: 3/3/2017          Codes Class Noted - Resolved POA    Hypomagnesemia ICD-10-CM: E83.42  ICD-9-CM: 275.2  3/7/2020 - Present Unknown        PFO (patent foramen ovale) ICD-10-CM: Q21.1  ICD-9-CM: 745.5  12/17/2019 - Present Yes        Arrhythmia ICD-10-CM: I49.9  ICD-9-CM: 427.9  12/15/2019 - Present Yes        Hyperlipemia ICD-10-CM: E78.5  ICD-9-CM: 272.4  12/15/2019 - Present Yes        * (Principal) Acute embolic stroke ([facility] [address] [facility]: I63.9  ICD-9-CM: 434.11  12/12/2019 - Present Yes        Hypertension (Chronic) ICD-10-CM: I10  ICD-9-CM: 401.9  Unknown - Present Yes        Type 2 diabetes mellitus (HCC) (Chronic) ICD-10-CM: E11.9  ICD-9-CM: 250.00  Unknown - Present Yes              A/P:    Acute embolic stroke  MRI brain showed acute to early subacute infarcts in the left cerebellar hemisphere, in the periventricular deep left frontoparietal white matter and likely additional areas of restricted diffusion in the right paramedian yariel.    +PFO on echo  On ASA, statin  Will need 4 week event monitor on DC, if no arrhythmia then will need PFO closure      Hypomagnesemia  Resolved       Hypertension  Continue metoprolol and ACE inhibitor  Goal BP <130/80     Type 2 diabetes mellitus:    Monitor, BGL controlled        Signed:  Gwendloyn Oppenheim, NP

## 2020-04-14 LAB — GLUCOSE BLD STRIP.AUTO-MCNC: 111 MG/DL (ref 65–100)

## 2020-04-14 PROCEDURE — 74011250637 HC RX REV CODE- 250/637: Performed by: HOSPITALIST

## 2020-04-14 PROCEDURE — 74011250637 HC RX REV CODE- 250/637: Performed by: INTERNAL MEDICINE

## 2020-04-14 PROCEDURE — 97530 THERAPEUTIC ACTIVITIES: CPT

## 2020-04-14 PROCEDURE — 74011250636 HC RX REV CODE- 250/636: Performed by: INTERNAL MEDICINE

## 2020-04-14 PROCEDURE — 97168 OT RE-EVAL EST PLAN CARE: CPT

## 2020-04-14 PROCEDURE — 97112 NEUROMUSCULAR REEDUCATION: CPT

## 2020-04-14 PROCEDURE — 65270000029 HC RM PRIVATE

## 2020-04-14 PROCEDURE — 74011250637 HC RX REV CODE- 250/637: Performed by: FAMILY MEDICINE

## 2020-04-14 PROCEDURE — 82962 GLUCOSE BLOOD TEST: CPT

## 2020-04-14 RX ADMIN — DIPHENHYDRAMINE HYDROCHLORIDE 25 MG: 25 CAPSULE ORAL at 16:05

## 2020-04-14 RX ADMIN — GUAIFENESIN 100 MG: 100 SOLUTION ORAL at 16:06

## 2020-04-14 RX ADMIN — Medication 10 ML: at 22:10

## 2020-04-14 RX ADMIN — METFORMIN HYDROCHLORIDE 500 MG: 500 TABLET ORAL at 08:04

## 2020-04-14 RX ADMIN — ASPIRIN 81 MG 81 MG: 81 TABLET ORAL at 08:04

## 2020-04-14 RX ADMIN — Medication 400 MG: at 08:04

## 2020-04-14 RX ADMIN — ACETAMINOPHEN 650 MG: 325 TABLET, FILM COATED ORAL at 22:09

## 2020-04-14 RX ADMIN — ACETAMINOPHEN 650 MG: 325 TABLET, FILM COATED ORAL at 05:30

## 2020-04-14 RX ADMIN — LISINOPRIL 40 MG: 20 TABLET ORAL at 08:04

## 2020-04-14 RX ADMIN — ATORVASTATIN CALCIUM 80 MG: 80 TABLET, FILM COATED ORAL at 22:09

## 2020-04-14 RX ADMIN — ENOXAPARIN SODIUM 40 MG: 40 INJECTION SUBCUTANEOUS at 13:43

## 2020-04-14 RX ADMIN — METOPROLOL SUCCINATE 25 MG: 25 TABLET, FILM COATED, EXTENDED RELEASE ORAL at 08:04

## 2020-04-14 RX ADMIN — ACETAMINOPHEN 650 MG: 325 TABLET, FILM COATED ORAL at 13:43

## 2020-04-14 NOTE — PROGRESS NOTES
Hospitalist Progress Note    Subjective:   Daily Progress Note: 4/14/2020 2:25 PM    Patient admitted 12/12/19 with acute right side embolic stroke with left side residual weakness and left facial droop. CT brain on admission with indeterminate infarctions within the left corona radiata/caudate.  CTA of the head and neck with stenosis at the distal segment of the right  Vertebral artery and multifocal stenosis in the P2 segment of the left posterior cerebral artery.  MRI brain with acute to early subacute infarcts in the left cerebellar hemisphere in the periventricular deep left frontoparietal white matter and likely additional areas of restricted diffusion in the right paramedian yariel. Echo with + PFO, on statin and ASA.  Will need event monitor on discharge and if no arrhythmia, PFO closure recommended.  Wife informs CM she does not want patient at home, they were planning to separate prior to this stroke.  CM attempting  to place in STR, medicaid pending.    4/9:  Speech remains slightly slurred, SLP signing off as clinical indication no longer needed.    4/10:   Continued good humor. Working with PT, OT. No complaints. Mag critically low today: 1.3. Replaced     4/14:  Resting quietly in bed. Continues to wait for placement. Good participation with PT/OT. Has a little use of right hand, still unable to .      Current Facility-Administered Medications   Medication Dose Route Frequency    metFORMIN (GLUCOPHAGE) tablet 500 mg  500 mg Oral DAILY WITH BREAKFAST    magnesium oxide (MAG-OX) tablet 400 mg  400 mg Oral DAILY    acetaminophen (TYLENOL) tablet 650 mg  650 mg Oral Q4H PRN    diphenhydrAMINE (BENADRYL) capsule 25 mg  25 mg Oral Q6H PRN    acetaminophen (TYLENOL) tablet 650 mg  650 mg Oral Q8H    lip protectant (BLISTEX) ointment 1 Each  1 Each Topical PRN    metoprolol succinate (TOPROL-XL) XL tablet 25 mg  25 mg Oral DAILY    polyethylene glycol (MIRALAX) packet 17 g  17 g Oral DAILY PRN  guaiFENesin (ROBITUSSIN) 100 mg/5 mL oral liquid 100 mg  100 mg Oral Q4H PRN    hydrALAZINE (APRESOLINE) 20 mg/mL injection 20 mg  20 mg IntraVENous Q4H PRN    atorvastatin (LIPITOR) tablet 80 mg  80 mg Oral QHS    lisinopril (PRINIVIL, ZESTRIL) tablet 40 mg  40 mg Oral DAILY    sodium chloride (NS) flush 5-40 mL  5-40 mL IntraVENous PRN    ondansetron (ZOFRAN) injection 4 mg  4 mg IntraVENous Q4H PRN    aspirin chewable tablet 81 mg  81 mg Oral DAILY    enoxaparin (LOVENOX) injection 40 mg  40 mg SubCUTAneous Q24H    dextrose 40% (GLUTOSE) oral gel 1 Tube  15 g Oral PRN    glucagon (GLUCAGEN) injection 1 mg  1 mg IntraMUSCular PRN    dextrose (D50W) injection syrg 12.5-25 g  25-50 mL IntraVENous PRN      Review of Systems  A comprehensive review of systems was negative except for that written in the HPI. Objective:     Visit Vitals  /75 (BP 1 Location: Right arm, BP Patient Position: At rest)   Pulse 70   Temp 98.8 °F (37.1 °C)   Resp 18   Ht 5' 6\" (1.676 m)   Wt 79.8 kg (175 lb 14.4 oz)   SpO2 95%   BMI 28.39 kg/m²      O2 Device: Room air    Temp (24hrs), Av.1 °F (36.7 °C), Min:97.5 °F (36.4 °C), Max:98.8 °F (37.1 °C)    General appearance: Oriented and alert, cooperative, denies need or new issues.  In good spirits.    Head: Normocephalic, without obvious abnormality, atraumatic  Eyes: conjunctivae/corneas clear. PERRL  Throat: Continued mild left facial weakness.  Lips, mucosa, and tongue normal. Teeth and gums normal  Neck: supple, symmetrical, trachea midline, no JVD  Lungs: clear to auscultation bilaterally  Heart: regular rate and rhythm, S1, S2 normal, no murmur, click, rub or gallop  Abdomen: soft, non-tender. Bowel sounds normal. No masses,  no organomegaly  Extremities: Right lower and upper extremities normal, atraumatic, no cyanosis or edema.  Persistent left side residual weakness, both upper and lower extremities on left weak.    Skin: Skin color, texture, turgor normal. No rashes or lesions  Neurologic: As above.     Additional comments: Notes,orders, test results, vitals reviewed    Data Review  Recent Results (from the past 24 hour(s))   GLUCOSE, POC    Collection Time: 20  6:11 AM   Result Value Ref Range    Glucose (POC) 111 (H) 65 - 100 mg/dL        Assessment/Plan:   Acute right embolic stroke with left side weakness 2019              Making good progress with PT/OT     Dysarthria due to stroke:  Improved immensely              SLP released due to improved without need for  further intervention      Difficult placement with marital discord prior to stroke:  Wife does not want him at home:  CM working on tirelessly              Now issues obtaining MR for [employer]          application               CM continues to pursue placement      Hypertension:  Continue lisinopril, toprol XL     IDDM II:  Continue glucophage, diabetic diet     Arrhythmia:  On beta blocker     PFO 2019:       Hypomagnesemia:  4/10:  Ma.3   Replaced to 1.9    Care Plan discussed with: Patient, CM, and Nurse    Signed By: Ivania Mccullough NP     2020

## 2020-04-14 NOTE — PROGRESS NOTES
Problem: Self Care Deficits Care Plan (Adult)  Goal: *Acute Goals and Plan of Care (Insert Text)  Description  Goals per Re-evaluation on 3/18/2020:   1. Patient will demonstrate appropriate safety awareness and protection of L UE during bed mobility and functional transfers with minimal cues. (Progressing, still needs cues, 4/14/20)  2. Patient will complete total body bathing and dressing with Min A and adaptive equipment as needed. (Progressing 4/14/20  3. Patient will complete weightbearing into the L UE with ADL tasks with minimal assistance to improve ability to use as a functional assist during ADL tasks. (Progressing 4/14/20)4. Patient will demonstrate L UE SROM HEP within 7 days. (Progressing, 3/18/2020)  5. Pt will complete bed mobility with Mod I and minimal verbal cueing (Progressing, Supervision, 4/14/20). 6. Pt will complete dynamic sitting balance for ADLs at mod I with good balance (Progressing, 4/14/20  7. Pt will complete functional transfers with Supervision with equipment as needed. (Progressing, SBA to CGA 4/14/20)  8. Pt will complete grooming tasks in standing at sink level x5 mins with good balance and equipment as needed MET  9. Pt will complete grooming standing at sink level with SBA and use of adaptive equipment as needed. MET  10. Pt will don/doff UE sling with SBA and use of minimal verbal and visual cueing for correct application (Progressing, Min A 4/14/20. Goals per Re-evaluation on 3/27/2020:   1. Patient will demonstrate appropriate safety awareness and protection of L UE during bed mobility and functional transfers with no verbal cues. CONTINUE   2. Patient will complete lower body bathing and dressing with Min A and adaptive equipment as needed. CONTINUE  3. Patient will complete upper body bathing and dressing with SBA and adaptive equipment as needed.  CONTINUE  4. Patient will complete weightbearing into the L UE with ADL tasks with minimal assistance to improve ability to use as a functional assist during ADL tasks. CONTINUE  5. Patient will demonstrate L UE SROM HEP within 7 days. CONTINUE  6. Pt will complete bed mobility with Mod I and no verbal cueing. CONTINUE  7. Pt will complete functional transfers with Supervision with equipment as needed. CONTINUE  8. Pt will don/doff UE sling with Mod I and no verbal cueing. CONTINUE  9. Pt will complete toileting with SBA for toilet transfer and gown management. CONTINUE  10. Patient will participate in using L UE as a functional assist for ADL for 15 minutes with minimal facilitation. NEW GOAL 4/14/20  11. Patient will complete sit to stand at midline with minimal assistance and cueing. NEW GOAL 4/14/20  12. Patient will complete therapeutic exercises with L UE with minimal tactile cues for facilitation of the LUE. NeW GOAL 4/14/20    Outcome: Progressing Towards Goal     OCCUPATIONAL THERAPY: Daily Note, Re-evaluation and PM    4/14/2020  INPATIENT: OT Visit Days: 1  Payor: MEDICAID PENDING / Plan: [place] PENDING / Product Type: Medicaid /      NAME/AGE/GENDER: Yo Lind is a 53 y.o. male   PRIMARY DIAGNOSIS:  Acute ischemic stroke ([address] [I63.9]  Cerebrovascular accident (CVA) due to embolism ([address] [L51.9] Acute embolic stroke ([address] Acute embolic stroke ([address]       ICD-10: Treatment Diagnosis:    · Generalized Muscle Weakness (M62.81)  · Other lack of cordination (R27.8)  · Hemiplegia and hemiparesis following cerebral infarction affecting   · left non-dominant side (I69.354)  · Abnormal posture (R29.3)   Precautions/Allergies:    NO PULLING ON LUE   LUE in sling with shoulder joint approximated and supported, needs taping   Patient has no known allergies. ASSESSMENT:     Mr. Luc Gómez presents to the hospital with L sided weakness and acute ischemic CVA. MRI revealed acute to subacute L cerebellar and R paramedian yariel infarcts. 4/14/2020: Pt supine in the bed.  Pt is ready to work with therapy this pm. Pt continues to need minimal assistance to CGA with bed mobility. Pt participated in exercises and activities to improve strength/functional of the L UE from seated position. Pt had difficulty with weightbearing tasks in the L UE due to limited ROM at the wrist with some pain reported. Pt continues to have limited strength in the L UE with trace to 2-/5 strength grossly. Pt needs moderate to maximal facilitation with exercises/activities with the L side. Pt also continues to lean heavily to the R with sit to stand with limited weightbearing in the L LE and no attempt to use L UE to assist with transfer. Pt requires additional cueing for directions with activities and tends to use a lot of compensations at the trunk with attempts to move L UE as well. Pt completes functional transfers with SBA/CGA but if encouraged to use L side more he needs more like minimal assistance. Pt completed functional mobility with rolling walker with pt encouraged for better position of the  L LE. Pt encouraged for improved weightshift to the L with taking steps and cues for quad squeeze with taking steps. Less hyperextension with shorted steps. Pt left up in the wheelchair with mobility team at the end of the session. Pt will continue to benefit from OT services to address the above states goals. This section established at most recent assessment   PROBLEM LIST (Impairments causing functional limitations):  1. Decreased Strength  2. Decreased ADL/Functional Activities  3. Decreased Transfer Abilities  4. Decreased Ambulation Ability/Technique  5. Decreased Balance  6. Decreased Activity Tolerance  7. Increased Fatigue  8. Decreased Flexibility/Joint Mobility  9. Decreased Knowledge of Precautions  10. Decreased [place] with Home Exercise Program   INTERVENTIONS PLANNED: (Benefits and precautions of occupational therapy have been discussed with the patient.)  1. Activities of daily living training  2.  Adaptive equipment training  3. Balance training  4. Clothing management  5. Cognitive training  6. Donning&doffing training  7. Keanu tech training  8. Neuromuscular re-eduation  9. Therapeutic activity  10. Therapeutic exercise     TREATMENT PLAN: Frequency/Duration: Follow patient 3 times per week to address above goals. Rehabilitation Potential For Stated Goals: Excellent     REHAB RECOMMENDATIONS (at time of discharge pending progress):    Placement: It is my opinion, based on this patient's performance to date, that Mr. Davey Stein may benefit from intensive therapy at an [address] [place] [address] after discharge due to potential to make ongoing and sustainable functional improvement that is of practical value. Milton Castillo Pt functioning far below independent baseline, demonstrating good improvement and participation. Pt would likely benefit greatly and increase independence from inpatient rehab stay. Equipment:    TBD               OCCUPATIONAL PROFILE AND HISTORY:   History of Present Injury/Illness (Reason for Referral):  See H&P  Past Medical History/Comorbidities:   Mr. Davey Stein  has a past medical history of Acute ischemic stroke ([address] (12/12/2019), Acute pancreatitis (11/19/2014), Cerebrovascular accident (CVA) due to embolism ([address] (12/12/2019), Diabetes ([address] (2002), Diabetes ([address] Diabetes mellitus, and Hypertension. He also has no past medical history of Arthritis, Asthma, Autoimmune disease ([address] CAD (coronary artery disease), Cancer ([address] Chronic kidney disease, COPD, Dementia, Dementia ([address] Heart failure ([address] Ill-defined condition, Infectious disease, Liver disease, Other ill-defined conditions(799.89), Psychiatric disorder, PUD (peptic ulcer disease), Seizures ([address] or Sleep disorder. Mr. Davey Stein  has a past surgical history that includes hx hernia repair and hx orthopaedic.   Social History/Living Environment:   Home Environment: Apartment  # Steps to Enter: [phone] to Enter: Yes  [employer] : Bilateral  One/Two Story Residence: One story  Living Alone: No  Support Systems: Spouse/Significant Other/Partner  Patient Expects to be Discharged to[de-identified] Unknown  Current DME Used/Available at Home: None  Tub or Shower Type: Tub/Shower combination  Prior Level of Function/Work/Activity:  Pt lives at home with his wife. Pt is typically independent with ADL/functional mobility. Pt does not drive. Pt was working part-time at [employer]. Personal Factors:          Age:  53 y.o. Past/Current Experience:  CVA with flaccid L side        Other factors that influence how disability is experienced by the patient:  multiple co-morbidities    Number of Personal Factors/Comorbidities that affect the Plan of Care: Extensive review of physical, cognitive, and psychosocial performance (3+):  HIGH COMPLEXITY   ASSESSMENT OF OCCUPATIONAL PERFORMANCE[de-identified]   Activities of Daily Living:   Basic ADLs (From Assessment) Complex ADLs (From Assessment)   Feeding: Setup  Oral Facial Hygiene/Grooming: Minimum assistance  Bathing: Minimum assistance, Moderate assistance  Upper Body Dressing: Minimum assistance  Lower Body Dressing: Moderate assistance  Toileting: Minimum assistance Instrumental ADL  Meal Preparation: Total assistance  Homemaking: Total assistance  Medication Management: Total assistance  Financial Management: Total assistance   Grooming/Bathing/Dressing Activities of Daily Living     Cognitive Retraining  Safety/Judgement: Fall prevention   Upper Body Bathing  Bathing Assistance: Min A   Position Performed: Seated in chair                  Lower Body Dressing Assistance  Socks:  Total assistance (dependent) Bed/Mat Mobility  Supine to Sit: Minimum assistance  Sit to Stand: Contact guard assistance  Stand to Sit: Stand-by assistance  Bed to Chair: Contact guard assistance  Scooting: Minimum assistance(L hip)     Most Recent Physical Functioning:   Gross Assessment:  AROM: (limited AROM in the L UE)  PROM: Generally decreased, functional(L UE)  Strength: (grossly 2-/5 to trace movement in L UE)  Coordination: (non-functional in L UE)  Tone: Abnormal(hypotonic in L UE)  Sensation: Intact(reports intact grossly to light touch)            LUE Strength  L Scapular Elevation: 2-  L Scapular Retraction: 2-  L Shoulder Flexion: 1  L Elbow Flexion: 1  L Wrist Extension: 1  Posture:     Balance:  Sitting: Impaired  Sitting - Static: Good (unsupported)  Sitting - Dynamic: Fair (occasional)(+)  Standing: Impaired  Standing - Static: Good  Standing - Dynamic : Fair Bed Mobility:  Supine to Sit: Minimum assistance  Scooting: Minimum assistance(L hip)  Wheelchair Mobility:     Transfers:  Sit to Stand: Contact guard assistance  Stand to Sit: Stand-by assistance  Bed to Chair: Contact guard assistance            Patient Vitals for the past 6 hrs:   BP BP Patient Position SpO2 Pulse   04/14/20 1200 122/75 At rest 95 % 70       Mental Status  Neurologic State: Alert  Orientation Level: Oriented X4  Cognition: Decreased attention/concentration, Follows commands  Perception: Cues to attend to left side of body, Cues to maintain midline in sitting, Cues to maintain midline in standing  Perseveration: No perseveration noted  Safety/Judgement: Fall prevention    LUE Strength  L Scapular Elevation: 2-  L Scapular Retraction: 2-  L Shoulder Flexion: 1  L Elbow Flexion: 1  L Wrist Extension: 1                     Physical Skills Involved:  1. Range of Motion  2. Balance  3. Strength  4. Activity Tolerance  5. Fine Motor Control  6. Gross Motor Control Cognitive Skills Affected (resulting in the inability to perform in a timely and safe manner):  1. Perception  2. Expression Psychosocial Skills Affected:  1. Habits/Routines  2. Environmental Adaptation  3. Social Interaction  4. Emotional Regulation  5. Self-Awareness  6. Awareness of Others  7.  Social Roles   Number of elements that affect the Plan of Care: 5+:  HIGH COMPLEXITY   CLINICAL DECISION MAKIN[address] [place] [address] AM-PAC 6 Clicks   Daily Activity Inpatient Short Form  How much help from another person does the patient currently need. .. Total A Lot A Little None   1. Putting on and taking off regular lower body clothing? [] 1   [x] 2   [] 3   [] 4   2. Bathing (including washing, rinsing, drying)? [] 1   [] 2   [x] 3   [] 4   3. Toileting, which includes using toilet, bedpan or urinal?   [] 1   [] 2   [x] 3   [] 4   4. Putting on and taking off regular upper body clothing? [] 1   [] 2   [x] 3   [] 4   5. Taking care of personal grooming such as brushing teeth? [] 1   [] 2   [x] 3   [] 4   6. Eating meals? [] 1   [] 2   [x] 3   [] 4   © , Trustees of [address] [place], [place] [address], under license to [employer] All rights reserved      Score:  Initial: 11 Most Recent: 17 (20)    Interpretation of Tool:  Represents activities that are increasingly more difficult (i.e. Bed mobility, Transfers, Gait). Medical Necessity:     · Patient demonstrates   · good and excellent  ·  rehab potential due to higher previous functional level. Reason for Services/Other Comments:  · Patient continues to require skilled intervention due to   · Decreased independence with ADL/functional transfers that impacts overall quality of life. · .   Use of outcome tool(s) and clinical judgement create a POC that gives a: MODERATE COMPLEXITY         TREATMENT:   (In addition to Assessment/Re-Assessment sessions the following treatments were rendered)     Pre-treatment Symptoms/Complaints:  \"Can we work on my nails. \"  Pain: Initial:   Pain Intensity 1: 0 Post Session: Unchanged     Therapeutic Activity: (10  minutes): Therapeutic activities including Bed transfers and Ambulation on level ground and wheelchair management to improve mobility, strength, balance and coordination.   Required minimal verbal/tactile/visual cues   to promote dynamic balance in standing and promote motor control of left, upper extremity(s), lower extremity(s). Patient uses rolling walker during functional mobility. n/a    Neuromuscular Re-education: ( 30 minutes):  Exercise/activities per grid below to improve balance, coordination and posture. Required moderate visual, verbal, manual and tactile cues to promote dynamic balance in standing and promote motor control of left, upper extremity(s). Date:  4/7/20 Date:  4/14/20  Date:     Activity/Exercise Parameters Parameters Parameters   Midline orientation sit to stand  Moderate cues and facilitation to decrease rotation at trunk and for midline trunk position Moderate facilitation at the L LE and for decreased rotation at the trunk    Midline sit to squat   Min to mod A w/ hands in his lap     Weightbearing into L side standing at sink  Minimal facilitation at the L UE; ~10 reps      Forward reach with cane 10 reps with facilitation at the elbow/scapula  10 reps with moderate facilitation at the elbow/scapula    External rotation with cane 10 reps with minimal facilitation  10 reps with minimal facilitation     Posture  Upright posture with thoracic extension and B hands place in the lap  Upright sitting/standing with verbal/visual cueing     Weightbearing into elbow   10 reps with moderate assistance pushing up into midline     Weightbearing into hand  Mod to maximal assistance with facilitation at the elbow; 2 sets x 10 reps     Elbow flexion  10 reps AAROM; 10 reps holding ball in B hands     Grasp release of washcloth   10 reps with moderate to maximal facilitation for reaching             Braces/Orthotics/Lines/Etc:   · O2 device: Room air  · LUE sling  Treatment/Session Assessment:    · Response to Treatment:  Pt with increased assist needed for bed mobility today.   · Interdisciplinary Collaboration:   o Occupational Therapist  o Registered Nurse  · After treatment position/precautions:   o up to wheelchair with mobility team    · Compliance with Program/Exercises: Compliant all of the time, Will assess as treatment progresses. · Recommendations/Intent for next treatment session: \"Next visit will focus on advancements to more challenging activities and reduction in assistance provided\".   Total Treatment Duration:   OT Patient Time In/Time Out  Time In: 1345  Time Out: 1200 Josh Jackson, OT

## 2020-04-14 NOTE — PROGRESS NOTES
Problem: Patient Education: Go to Patient Education Activity  Goal: Patient/Family Education  Outcome: Progressing Towards Goal     Problem: Pressure Injury - Risk of  Goal: *Prevention of pressure injury  Description: Document Dewey Scale and appropriate interventions in the flowsheet. Outcome: Progressing Towards Goal  Note: Pressure Injury Interventions:  Sensory Interventions: Assess changes in LOC, Check visual cues for pain, Discuss PT/OT consult with provider, Pad between skin to skin, Pressure redistribution bed/mattress (bed type)    Moisture Interventions: Absorbent underpads, Apply protective barrier, creams and emollients, Check for incontinence Q2 hours and as needed, Limit adult briefs, Maintain skin hydration (lotion/cream), Minimize layers, Moisture barrier, Offer toileting Q_hr    Activity Interventions: PT/OT evaluation, Pressure redistribution bed/mattress(bed type), Increase time out of bed    Mobility Interventions: HOB 30 degrees or less, Pressure redistribution bed/mattress (bed type), PT/OT evaluation, Turn and reposition approx. every two hours(pillow and wedges)    Nutrition Interventions: Document food/fluid/supplement intake    Friction and Shear Interventions: HOB 30 degrees or less                Problem: Patient Education: Go to Patient Education Activity  Goal: Patient/Family Education  Outcome: Progressing Towards Goal     Problem: Falls - Risk of  Goal: *Absence of Falls  Description: Document Jeanne Fall Risk and appropriate interventions in the flowsheet.   Outcome: Progressing Towards Goal  Note: Fall Risk Interventions:  Mobility Interventions: Bed/chair exit alarm, Communicate number of staff needed for ambulation/transfer, Patient to call before getting OOB, Utilize walker, cane, or other assistive device    Mentation Interventions: Bed/chair exit alarm    Medication Interventions: Bed/chair exit alarm, Patient to call before getting OOB, Teach patient to arise slowly    Elimination Interventions: Bed/chair exit alarm, Call light in reach, Patient to call for help with toileting needs, Toilet paper/wipes in reach, Toileting schedule/hourly rounds    History of Falls Interventions: Bed/chair exit alarm, Door open when patient unattended         Problem: Patient Education: Go to Patient Education Activity  Goal: Patient/Family Education  Outcome: Progressing Towards Goal     Problem: Nutrition Deficit  Goal: *Optimize nutritional status  Outcome: Progressing Towards Goal     Problem: General Medical Care Plan  Goal: *Vital signs within specified parameters  Outcome: Progressing Towards Goal  Goal: *Labs within defined limits  Outcome: Progressing Towards Goal  Goal: *Absence of infection signs and symptoms  Description: Wash hand more often   Outcome: Progressing Towards Goal  Goal: *Optimal pain control at patient's stated goal  Outcome: Progressing Towards Goal  Goal: *Skin integrity maintained  Outcome: Progressing Towards Goal  Goal: *Fluid volume balance  Outcome: Progressing Towards Goal  Goal: *Optimize nutritional status  Outcome: Progressing Towards Goal  Goal: *Anxiety reduced or absent  Outcome: Progressing Towards Goal  Goal: *Progressive mobility and function (eg: ADL's)  Outcome: Progressing Towards Goal     Problem: Patient Education: Go to Patient Education Activity  Goal: Patient/Family Education  Outcome: Progressing Towards Goal     Problem: Patient Education: Go to Patient Education Activity  Goal: Patient/Family Education  Outcome: Progressing Towards Goal     Problem: Patient Education: Go to Patient Education Activity  Goal: Patient/Family Education  Outcome: Progressing Towards Goal     Problem: Patient Education: Go to Patient Education Activity  Goal: Patient/Family Education  Outcome: Progressing Towards Goal     Problem: Pain  Goal: *Control of Pain  Outcome: Progressing Towards Goal     Problem: Patient Education: Go to Patient Education Activity  Goal: Patient/Family Education  Outcome: Progressing Towards Goal

## 2020-04-14 NOTE — PROGRESS NOTES
Problem: Pressure Injury - Risk of  Goal: *Prevention of pressure injury  Description: Document Dewey Scale and appropriate interventions in the flowsheet. Outcome: Progressing Towards Goal  Note: Pressure Injury Interventions:  Sensory Interventions: Assess changes in LOC, Check visual cues for pain, Discuss PT/OT consult with provider, Pad between skin to skin, Pressure redistribution bed/mattress (bed type)    Moisture Interventions: Absorbent underpads, Apply protective barrier, creams and emollients, Check for incontinence Q2 hours and as needed, Limit adult briefs    Activity Interventions: Increase time out of bed, Pressure redistribution bed/mattress(bed type), PT/OT evaluation    Mobility Interventions: HOB 30 degrees or less, Pressure redistribution bed/mattress (bed type), PT/OT evaluation    Nutrition Interventions: Document food/fluid/supplement intake, Offer support with meals,snacks and hydration    Friction and Shear Interventions: HOB 30 degrees or less                Problem: Patient Education: Go to Patient Education Activity  Goal: Patient/Family Education  Outcome: Progressing Towards Goal     Problem: Falls - Risk of  Goal: *Absence of Falls  Description: Document Jeanne Fall Risk and appropriate interventions in the flowsheet.   Outcome: Progressing Towards Goal  Note: Fall Risk Interventions:  Mobility Interventions: Bed/chair exit alarm, OT consult for ADLs, Patient to call before getting OOB, PT Consult for mobility concerns    Mentation Interventions: Bed/chair exit alarm    Medication Interventions: Bed/chair exit alarm, Evaluate medications/consider consulting pharmacy, Patient to call before getting OOB, Teach patient to arise slowly    Elimination Interventions: Call light in reach, Bed/chair exit alarm, Stay With Me (per policy), Patient to call for help with toileting needs    History of Falls Interventions: Bed/chair exit alarm, Door open when patient unattended, Investigate reason for fall         Problem: Patient Education: Go to Patient Education Activity  Goal: Patient/Family Education  Outcome: Progressing Towards Goal     Problem: Diabetes Self-Management  Goal: *Disease process and treatment process  Description: Define diabetes and identify own type of diabetes; list 3 options for treating diabetes. Outcome: Progressing Towards Goal  Goal: *Incorporating nutritional management into lifestyle  Description: Describe effect of type, amount and timing of food on blood glucose; list 3 methods for planning meals. Outcome: Progressing Towards Goal  Goal: *Incorporating physical activity into lifestyle  Description: State effect of exercise on blood glucose levels. Outcome: Progressing Towards Goal  Goal: *Developing strategies to promote health/change behavior  Description: Define the ABC's of diabetes; identify appropriate screenings, schedule and personal plan for screenings. Outcome: Progressing Towards Goal  Goal: *Using medications safely  Description: State effect of diabetes medications on diabetes; name diabetes medication taking, action and side effects. Outcome: Progressing Towards Goal  Goal: *Monitoring blood glucose, interpreting and using results  Description: Identify recommended blood glucose targets  and personal targets. Outcome: Progressing Towards Goal  Goal: *Prevention, detection, treatment of acute complications  Description: List symptoms of hyper- and hypoglycemia; describe how to treat low blood sugar and actions for lowering  high blood glucose level. Outcome: Progressing Towards Goal  Goal: *Prevention, detection and treatment of chronic complications  Description: Define the natural course of diabetes and describe the relationship of blood glucose levels to long term complications of diabetes.   Outcome: Progressing Towards Goal  Goal: *Developing strategies to address psychosocial issues  Description: Describe feelings about living with diabetes; identify support needed and support network  Outcome: Progressing Towards Goal     Problem: Patient Education: Go to Patient Education Activity  Goal: Patient/Family Education  Outcome: Progressing Towards Goal     Problem: General Medical Care Plan  Goal: *Vital signs within specified parameters  Outcome: Progressing Towards Goal  Goal: *Labs within defined limits  Outcome: Progressing Towards Goal  Goal: *Absence of infection signs and symptoms  Description: Wash hand more often   Outcome: Progressing Towards Goal  Goal: *Optimal pain control at patient's stated goal  Outcome: Progressing Towards Goal  Goal: *Skin integrity maintained  Outcome: Progressing Towards Goal  Goal: *Fluid volume balance  Outcome: Progressing Towards Goal  Goal: *Optimize nutritional status  Outcome: Progressing Towards Goal  Goal: *Anxiety reduced or absent  Outcome: Progressing Towards Goal  Goal: *Progressive mobility and function (eg: ADL's)  Outcome: Progressing Towards Goal     Problem: Patient Education: Go to Patient Education Activity  Goal: Patient/Family Education  Outcome: Progressing Towards Goal     Problem: Pain  Goal: *Control of Pain  Outcome: Progressing Towards Goal     Problem: Patient Education: Go to Patient Education Activity  Goal: Patient/Family Education  Outcome: Progressing Towards Goal

## 2020-04-15 LAB
GLUCOSE BLD STRIP.AUTO-MCNC: 130 MG/DL (ref 65–100)
GLUCOSE BLD STRIP.AUTO-MCNC: 157 MG/DL (ref 65–100)

## 2020-04-15 PROCEDURE — 74011250637 HC RX REV CODE- 250/637: Performed by: HOSPITALIST

## 2020-04-15 PROCEDURE — 74011250637 HC RX REV CODE- 250/637: Performed by: INTERNAL MEDICINE

## 2020-04-15 PROCEDURE — 74011636637 HC RX REV CODE- 636/637: Performed by: NURSE PRACTITIONER

## 2020-04-15 PROCEDURE — 97116 GAIT TRAINING THERAPY: CPT | Performed by: PHYSICAL THERAPIST

## 2020-04-15 PROCEDURE — 74011250637 HC RX REV CODE- 250/637: Performed by: FAMILY MEDICINE

## 2020-04-15 PROCEDURE — 97110 THERAPEUTIC EXERCISES: CPT | Performed by: PHYSICAL THERAPIST

## 2020-04-15 PROCEDURE — 74011250636 HC RX REV CODE- 250/636: Performed by: INTERNAL MEDICINE

## 2020-04-15 PROCEDURE — 97110 THERAPEUTIC EXERCISES: CPT

## 2020-04-15 PROCEDURE — 97112 NEUROMUSCULAR REEDUCATION: CPT

## 2020-04-15 PROCEDURE — 82962 GLUCOSE BLOOD TEST: CPT

## 2020-04-15 PROCEDURE — 65270000029 HC RM PRIVATE

## 2020-04-15 RX ORDER — INSULIN LISPRO 100 [IU]/ML
0-10 INJECTION, SOLUTION INTRAVENOUS; SUBCUTANEOUS
Status: DISCONTINUED | OUTPATIENT
Start: 2020-04-15 | End: 2020-04-22

## 2020-04-15 RX ADMIN — LISINOPRIL 40 MG: 20 TABLET ORAL at 08:15

## 2020-04-15 RX ADMIN — METOPROLOL SUCCINATE 25 MG: 25 TABLET, FILM COATED, EXTENDED RELEASE ORAL at 08:15

## 2020-04-15 RX ADMIN — ACETAMINOPHEN 650 MG: 325 TABLET, FILM COATED ORAL at 13:46

## 2020-04-15 RX ADMIN — DIPHENHYDRAMINE HYDROCHLORIDE 25 MG: 25 CAPSULE ORAL at 17:30

## 2020-04-15 RX ADMIN — METFORMIN HYDROCHLORIDE 500 MG: 500 TABLET ORAL at 08:15

## 2020-04-15 RX ADMIN — ATORVASTATIN CALCIUM 80 MG: 80 TABLET, FILM COATED ORAL at 22:57

## 2020-04-15 RX ADMIN — ACETAMINOPHEN 650 MG: 325 TABLET, FILM COATED ORAL at 04:25

## 2020-04-15 RX ADMIN — INSULIN LISPRO 2 UNITS: 100 INJECTION, SOLUTION INTRAVENOUS; SUBCUTANEOUS at 17:45

## 2020-04-15 RX ADMIN — ACETAMINOPHEN 650 MG: 325 TABLET, FILM COATED ORAL at 22:56

## 2020-04-15 RX ADMIN — ASPIRIN 81 MG 81 MG: 81 TABLET ORAL at 08:15

## 2020-04-15 RX ADMIN — GUAIFENESIN 100 MG: 100 SOLUTION ORAL at 17:29

## 2020-04-15 RX ADMIN — Medication 400 MG: at 08:15

## 2020-04-15 RX ADMIN — ENOXAPARIN SODIUM 40 MG: 40 INJECTION SUBCUTANEOUS at 13:46

## 2020-04-15 NOTE — DIABETES MGMT
Patient seen for assessment regarding diabetes management. Patient has a past medical history of HTN, DM, pancreatitis, GERD. Patient states they were diagnosed \"in 2002\" with diabetes and voices positive family history of diabetes. Patient states they do not have a working glucometer with supplies at home. Per patient he is familiar with how to use a glucometer. Educated patient on the importance of FSBS monitoring and discussed different frequency based on what type of diabetes medication is being taken. Patient verbalized understanding. Also educated patient regarding over-the-counter glucometer options as patient is self pay. Patient states prior to admission they were taking 70/30 insulin (vial and syringe method) at home for management of diabetes, but admits to only taking the insulin a few times a week stating he was supposed to take 40 units in the morning and 30 units at night. Patient denies using insulin pens in the past. Patient has not attended formal diabetes education in the past. Patient reports he was going to [address] [place] [address] for his PCP. Patient given educational material, \"Diabetes Self-Management: A Patient Teaching Guide\", which was reviewed with patient. Explained basic physiology of diabetes, as well as causes, signs and symptoms, and treatments for hypoglycemia and hyperglycemia. Questioned patient regarding long term complications of uncontrolled diabetes. Patient states \"it can make you go blind and lose a limb. \" Described the effects of poor glycemic control and the development of long-term complications such as renal, eye, nerve, and cardiovascular disease. Discussed the increased risk of stroke with uncontrolled diabetes. Patient states he now has numbness and tingling in his left foot, but denies a history of neuropathy. Described proper diabetic foot care and the importance of checking feet daily.  Per patient they typically drink regular sodas and sweet tea at home, occasionally coffee with sugar and creamer. Reviewed alternative beverages to help improve glycemic control. Questioned patient if he knew what foods have carbs/what foods will raise his sugar. Patient states no and asks for examples. Educated re: effects of carbohydrates on blood glucose, the \"plate method\" of healthy meal planning, basics of healthy meal plan, Consistent Carbohydrate Diet, discussed the basics of carb counting and how to read a nutrition label. Patient states for lunch he had a turkey sandwich and fries. Reviewed which foods were carbs versus proteins with patient. Encouraged patient to look over dietary menus to help him become familiar with which foods primarily impact his glycemic control. Patient verbalized understanding. Educated patient regarding the benefits of physical activity (as cleared by provider) on glycemic control. Patient states he has been working with physical therapy. Also explained the relationship between hyperglycemia and infection and delayed healing. Discussed target goals for blood glucose and A1C. Educated patient regarding diabetic medication-Metformin including mechanism of action, timing, and possible side effects. Patient verbalizes understanding of teaching.

## 2020-04-15 NOTE — PROGRESS NOTES
Hospitalist Progress Note    Subjective:   Daily Progress Note: 4/15/2020 0820    Patient admitted 12/12/19 with acute right side embolic stroke with left side residual weakness and left facial droop. CT brain on admission with indeterminate infarctions within the left corona radiata/caudate.  CTA of the head and neck with stenosis at the distal segment of the right  Vertebral artery and multifocal stenosis in the P2 segment of the left posterior cerebral artery.  MRI brain with acute to early subacute infarcts in the left cerebellar hemisphere in the periventricular deep left frontoparietal white matter and likely additional areas of restricted diffusion in the right paramedian yariel. Echo with + PFO, on statin and ASA.  Will need event monitor on discharge and if no arrhythmia, PFO closure recommended.  Wife informs CM she does not want patient at home, they were planning to separate prior to this stroke.  CM attempting  to place in STR, medicaid pending.    4/9:  Speech remains slightly slurred, SLP signing off as clinical indication no longer needed.    4/10:   Continued good humor.  Working with PT, OT.  No complaints. Mag critically low today: 1.3. Replaced   4/14:  Resting quietly in bed. Continues to wait for placement. Good participation with PT/OT. Has a little use of right hand, still unable to .      4/15:  No changes. Continues to work with OT/PT. DM edu/management in, appreciate input. On metformin from insulin in January of this year. Placed on SSI.      ADDITIONAL HISTORY:  DM II, HTN, pancreatitis,     Current Facility-Administered Medications   Medication Dose Route Frequency    metFORMIN (GLUCOPHAGE) tablet 500 mg  500 mg Oral DAILY WITH BREAKFAST    magnesium oxide (MAG-OX) tablet 400 mg  400 mg Oral DAILY    acetaminophen (TYLENOL) tablet 650 mg  650 mg Oral Q4H PRN    diphenhydrAMINE (BENADRYL) capsule 25 mg  25 mg Oral Q6H PRN    acetaminophen (TYLENOL) tablet 650 mg  650 mg Oral Q8H    lip protectant (BLISTEX) ointment 1 Each  1 Each Topical PRN    metoprolol succinate (TOPROL-XL) XL tablet 25 mg  25 mg Oral DAILY    polyethylene glycol (MIRALAX) packet 17 g  17 g Oral DAILY PRN    guaiFENesin (ROBITUSSIN) 100 mg/5 mL oral liquid 100 mg  100 mg Oral Q4H PRN    hydrALAZINE (APRESOLINE) 20 mg/mL injection 20 mg  20 mg IntraVENous Q4H PRN    atorvastatin (LIPITOR) tablet 80 mg  80 mg Oral QHS    lisinopril (PRINIVIL, ZESTRIL) tablet 40 mg  40 mg Oral DAILY    sodium chloride (NS) flush 5-40 mL  5-40 mL IntraVENous PRN    ondansetron (ZOFRAN) injection 4 mg  4 mg IntraVENous Q4H PRN    aspirin chewable tablet 81 mg  81 mg Oral DAILY    enoxaparin (LOVENOX) injection 40 mg  40 mg SubCUTAneous Q24H    dextrose 40% (GLUTOSE) oral gel 1 Tube  15 g Oral PRN    glucagon (GLUCAGEN) injection 1 mg  1 mg IntraMUSCular PRN    dextrose (D50W) injection syrg 12.5-25 g  25-50 mL IntraVENous PRN      Review of Systems  A comprehensive review of systems was negative except for that written in the HPI. Objective:     Visit Vitals  /82 (BP 1 Location: Right arm, BP Patient Position: At rest)   Pulse 80   Temp 98.1 °F (36.7 °C)   Resp 16   Ht 5' 6\" (1.676 m)   Wt 79.8 kg (175 lb 14.4 oz)   SpO2 99%   BMI 28.39 kg/m²      O2 Device: Room air    Temp (24hrs), Av.1 °F (36.7 °C), Min:97.8 °F (36.6 °C), Max:98.8 °F (37.1 °C)    General appearance: Oriented and alert, cooperative, denies need or new issues. In good spirits. Head: Normocephalic, without obvious abnormality, atraumatic  Eyes: conjunctivae/corneas clear. PERRL  Throat: Continued mild left facial weakness. Lips, mucosa, and tongue normal. Teeth and gums normal  Neck: supple, symmetrical, trachea midline, no JVD  Lungs: clear to auscultation bilaterally  Heart: regular rate and rhythm, S1, S2 normal, no murmur, click, rub or gallop  Abdomen: soft, non-tender.  Bowel sounds normal. No masses,  no organomegaly  Extremities: Right lower and upper extremities normal, atraumatic, no cyanosis or edema. Persistent left side residual weakness, both upper and lower extremities on left weak. Skin: Skin color, texture, turgor normal. No rashes or lesions  Neurologic: As above. Additional comments: Notes,orders, test results, vitals reviewed    Data Review   Ref. Range 4/10/2020 04:12   WBC Latest Ref Range: 4.3 - 11.1 K/uL 5.2   RBC Latest Ref Range: 4.23 - 5.6 M/uL 4.12 (L)   HGB Latest Ref Range: 13.6 - 17.2 g/dL 10.9 (L)   HCT Latest Ref Range: 41.1 - 50.3 % 34.7 (L)   MCV Latest Ref Range: 79.6 - 97.8 FL 84.2   MCH Latest Ref Range: 26.1 - 32.9 PG 26.5   MCHC Latest Ref Range: 31.4 - 35.0 g/dL 31.4   RDW Latest Ref Range: 11.9 - 14.6 % 16.8 (H)   PLATELET Latest Ref Range: 150 - 450 K/uL 180   MPV Latest Ref Range: 9.4 - 12.3 FL 11.5   NEUTROPHILS Latest Ref Range: 43 - 78 % 45   LYMPHOCYTES Latest Ref Range: 13 - 44 % 45 (H)   MONOCYTES Latest Ref Range: 4.0 - 12.0 % 8   EOSINOPHILS Latest Ref Range: 0.5 - 7.8 % 1   BASOPHILS Latest Ref Range: 0.0 - 2.0 % 1   IMMATURE GRANULOCYTES Latest Ref Range: 0.0 - 5.0 % 0   DF Latest Units:   AUTOMATED   ABSOLUTE NRBC Latest Ref Range: 0.0 - 0.2 K/uL 0.00   ABS. NEUTROPHILS Latest Ref Range: 1.7 - 8.2 K/UL 2.3   ABS. IMM. GRANS. Latest Ref Range: 0.0 - 0.5 K/UL 0.0   ABS. LYMPHOCYTES Latest Ref Range: 0.5 - 4.6 K/UL 2.3   ABS. MONOCYTES Latest Ref Range: 0.1 - 1.3 K/UL 0.4   ABS. EOSINOPHILS Latest Ref Range: 0.0 - 0.8 K/UL 0.1   ABS.  BASOPHILS Latest Ref Range: 0.0 - 0.2 K/UL 0.0   Sodium Latest Ref Range: 136 - 145 mmol/L 142   Potassium Latest Ref Range: 3.5 - 5.1 mmol/L 4.1   Chloride Latest Ref Range: 98 - 107 mmol/L 109 (H)   CO2 Latest Ref Range: 21 - 32 mmol/L 27   Anion gap Latest Ref Range: 7 - 16 mmol/L 6 (L)   Glucose Latest Ref Range: 65 - 100 mg/dL 104 (H)   BUN Latest Ref Range: 6 - 23 MG/DL 15   Creatinine Latest Ref Range: 0.8 - 1.5 MG/DL 1.04   Calcium Latest Ref Range: 8.3 - 10.4 MG/DL 9.5   Magnesium Latest Ref Range: 1.8 - 2.4 mg/dL 1.3 (LL)   GFR est non-AA Latest Ref Range: >60 ml/min/1.73m2 >60   GFR est AA Latest Ref Range: >60 ml/min/1.73m2 >60   Bilirubin, total Latest Ref Range: 0.2 - 1.1 MG/DL 0.5   Protein, total Latest Ref Range: 6.3 - 8.2 g/dL 6.5   Albumin Latest Ref Range: 3.5 - 5.0 g/dL 2.6 (L)   Globulin Latest Ref Range: 2.3 - 3.5 g/dL 3.9 (H)   A-G Ratio Latest Ref Range: 1.2 - 3.5   0.7 (L)   ALT (SGPT) Latest Ref Range: 12 - 65 U/L 22   AST Latest Ref Range: 15 - 37 U/L 18   Alk. phosphatase Latest Ref Range: 50 - 136 U/L 65     MA20:  1.9    19:  CT HEAD:  Age indeterminate infarctions within the left corona radiata/caudate. Mild chronic small vessel ischemic changes and areas of remote infarction as described. Probable partial volume averaging the region of the left thalamus rather than intraparenchymal hemorrhage. 19:  CTA HEAD AND NECK:      Stenosis at the distal segment of the right vertebral artery and multifocal  stenosis in the P2 segment of the left posterior cerebral artery. 19:  MRI BRAIN:  Acute to early subacute infarcts in the left cerebellar hemisphere, in the periventricular deep left frontoparietal white matter and likely additional areas of restricted diffusion in the right paramedian yariel. Old lacunar infarcts in the corpus striatum and periventricular white matter as well as within the left corona radiata. 20:  SWALLOW FUNCTION VIDEO: Small quantity aspiration with straw sips of thin liquids. Assessment/Plan:   Acute to early subacute infarcts in the left cerebellar hemisphere, in the periventricular deep left frontoparietal white matter and likely additional areas of restricted diffusion in the right paramedian yariel. Old lacunar infarcts also.        Making good progress with PT/OT    Speech and swallowing improved to the extent SLP  signed off   Pending placement      Dysarthria due to stroke: Improved immensely              SLP releasing 4/14 due to improved without need for  further intervention      Difficult placement with marital discord prior to stroke: Wife does not want him at home:  CM working on tirelessly              Now issues obtaining MR for medicaid application      Hypertension:  Continue lisinopril, toprol XL     IDDM II:  Continue glucophage, diabetic diet.  A1C: 8.4, rechecking    adding SSI 4/15   DM education and management      Arrhythmia:  On beta blocker     PFO 12/17/2019:       Hypomagnesemia:  Replace and recheck      Last labs:  4/10/20    Care Plan discussed with: Patient, CM, DM management and Nurse    Signed By: Swati Sánchez NP     April 15, 2020

## 2020-04-15 NOTE — PROGRESS NOTES
Problem: Self Care Deficits Care Plan (Adult)  Goal: *Acute Goals and Plan of Care (Insert Text)  Description  Goals per Re-evaluation on 3/18/2020:   1. Patient will demonstrate appropriate safety awareness and protection of L UE during bed mobility and functional transfers with minimal cues. (Progressing, still needs cues, 4/14/20)  2. Patient will complete total body bathing and dressing with Min A and adaptive equipment as needed. (Progressing 4/14/20  3. Patient will complete weightbearing into the L UE with ADL tasks with minimal assistance to improve ability to use as a functional assist during ADL tasks. (Progressing 4/14/20)4. Patient will demonstrate L UE SROM HEP within 7 days. (Progressing, 3/18/2020)  5. Pt will complete bed mobility with Mod I and minimal verbal cueing (Progressing, Supervision, 4/14/20). 6. Pt will complete dynamic sitting balance for ADLs at mod I with good balance (Progressing, 4/14/20  7. Pt will complete functional transfers with Supervision with equipment as needed. (Progressing, SBA to CGA 4/14/20)  8. Pt will complete grooming tasks in standing at sink level x5 mins with good balance and equipment as needed MET  9. Pt will complete grooming standing at sink level with SBA and use of adaptive equipment as needed. MET  10. Pt will don/doff UE sling with SBA and use of minimal verbal and visual cueing for correct application (Progressing, Min A 4/14/20. Goals per Re-evaluation on 3/27/2020:   1. Patient will demonstrate appropriate safety awareness and protection of L UE during bed mobility and functional transfers with no verbal cues. CONTINUE   2. Patient will complete lower body bathing and dressing with Min A and adaptive equipment as needed. CONTINUE  3. Patient will complete upper body bathing and dressing with SBA and adaptive equipment as needed.  CONTINUE  4. Patient will complete weightbearing into the L UE with ADL tasks with minimal assistance to improve ability to use as a functional assist during ADL tasks. CONTINUE  5. Patient will demonstrate L UE SROM HEP within 7 days. CONTINUE  6. Pt will complete bed mobility with Mod I and no verbal cueing. CONTINUE  7. Pt will complete functional transfers with Supervision with equipment as needed. CONTINUE  8. Pt will don/doff UE sling with Mod I and no verbal cueing. CONTINUE  9. Pt will complete toileting with SBA for toilet transfer and gown management. CONTINUE  10. Patient will participate in using L UE as a functional assist for ADL for 15 minutes with minimal facilitation. NEW GOAL 4/14/20  11. Patient will complete sit to stand at midline with minimal assistance and cueing. NEW GOAL 4/14/20  12. Patient will complete therapeutic exercises with L UE with minimal tactile cues for facilitation of the LUE. NeW GOAL 4/14/20    Outcome: Progressing Towards Goal     OCCUPATIONAL THERAPY: Daily Note, Re-evaluation and PM    4/15/2020  INPATIENT: OT Visit Days: 2  Payor: MEDICAID PENDING / Plan: Nadir Newell PENDING / Product Type: Medicaid /      NAME/AGE/GENDER: Calin Yen is a 53 y.o. male   PRIMARY DIAGNOSIS:  Acute ischemic stroke ([address] [I63.9]  Cerebrovascular accident (CVA) due to embolism ([address] [Q45.0] Acute embolic stroke ([address] Acute embolic stroke ([address]       ICD-10: Treatment Diagnosis:    · Generalized Muscle Weakness (M62.81)  · Other lack of cordination (R27.8)  · Hemiplegia and hemiparesis following cerebral infarction affecting   · left non-dominant side (I69.354)  · Abnormal posture (R29.3)   Precautions/Allergies:    NO PULLING ON LUE   LUE in sling with shoulder joint approximated and supported, needs taping   Patient has no known allergies. ASSESSMENT:     Mr. Sherine Joshi presents to the hospital with L sided weakness and acute ischemic CVA. MRI revealed acute to subacute L cerebellar and R paramedian yariel infarcts.      4/15/2020: Pt supine in the bed and agreeable to working with OT this pm. Pt reminded of protecting the L UE during transfer with pt requiring verbal and some tactile cues. Pt still needing CGA to fully pull in upright sitting position at the edge of the bed. Pt worked on exercises and activities at the edge of the bed to work on improving function and ROM of the L UE. Pt with continued limited movement proximally at the shoulder. Pt does demonstrate some active supination/prontation and wrist extension. Pt does have some increased wrist pain reporting 7/10. Pt needed moderate to maximal facilitation with exercises/activities with the L UE today. Pt returned back to supine at the end of the session. Pt to continue per plan of care. This section established at most recent assessment   PROBLEM LIST (Impairments causing functional limitations):  1. Decreased Strength  2. Decreased ADL/Functional Activities  3. Decreased Transfer Abilities  4. Decreased Ambulation Ability/Technique  5. Decreased Balance  6. Decreased Activity Tolerance  7. Increased Fatigue  8. Decreased Flexibility/Joint Mobility  9. Decreased Knowledge of Precautions  10. Decreased [place] with Home Exercise Program   INTERVENTIONS PLANNED: (Benefits and precautions of occupational therapy have been discussed with the patient.)  1. Activities of daily living training  2. Adaptive equipment training  3. Balance training  4. Clothing management  5. Cognitive training  6. Donning&doffing training  7. Keanu tech training  8. Neuromuscular re-eduation  9. Therapeutic activity  10. Therapeutic exercise     TREATMENT PLAN: Frequency/Duration: Follow patient 3 times per week to address above goals. Rehabilitation Potential For Stated Goals: Excellent     REHAB RECOMMENDATIONS (at time of discharge pending progress):    Placement:   It is my opinion, based on this patient's performance to date, that Mr. Krys Grimaldo may benefit from intensive therapy at an [address] [place] [address] after discharge due to potential to make ongoing and sustainable functional improvement that is of practical value. Yannick Angry Pt functioning far below independent baseline, demonstrating good improvement and participation. Pt would likely benefit greatly and increase independence from inpatient rehab stay. Equipment:    TBD               OCCUPATIONAL PROFILE AND HISTORY:   History of Present Injury/Illness (Reason for Referral):  See H&P  Past Medical History/Comorbidities:   Mr. Aristeo Rascon  has a past medical history of Acute ischemic stroke ([address] (12/12/2019), Acute pancreatitis (11/19/2014), Cerebrovascular accident (CVA) due to embolism ([address] (12/12/2019), Diabetes ([address] (2002), Diabetes ([address] Diabetes mellitus, and Hypertension. He also has no past medical history of Arthritis, Asthma, Autoimmune disease ([address] CAD (coronary artery disease), Cancer ([address] Chronic kidney disease, COPD, Dementia, Dementia ([address] Heart failure ([address] Ill-defined condition, Infectious disease, Liver disease, Other ill-defined conditions(799.89), Psychiatric disorder, PUD (peptic ulcer disease), Seizures ([address] or Sleep disorder. Mr. Aristeo Rascon  has a past surgical history that includes hx hernia repair and hx orthopaedic. Social History/Living Environment:   Home Environment: Apartment  # Steps to Enter: 12  Rails to Enter: Yes  [employer] : Bilateral  One/Two Story Residence: One story  Living Alone: No  Support Systems: Spouse/Significant Other/Partner  Patient Expects to be Discharged to[de-identified] Unknown  Current DME Used/Available at Home: None  Tub or Shower Type: Tub/Shower combination  Prior Level of Function/Work/Activity:  Pt lives at home with his wife. Pt is typically independent with ADL/functional mobility. Pt does not drive. Pt was working part-time at [employer]. Personal Factors:          Age:  53 y.o.         Past/Current Experience:  CVA with flaccid L side        Other factors that influence how disability is experienced by the patient:  multiple co-morbidities Number of Personal Factors/Comorbidities that affect the Plan of Care: Extensive review of physical, cognitive, and psychosocial performance (3+):  HIGH COMPLEXITY   ASSESSMENT OF OCCUPATIONAL PERFORMANCE[de-identified]   Activities of Daily Living:   Basic ADLs (From Assessment) Complex ADLs (From Assessment)   Feeding: Setup  Oral Facial Hygiene/Grooming: Minimum assistance  Bathing: Minimum assistance, Moderate assistance  Upper Body Dressing: Minimum assistance  Lower Body Dressing: Moderate assistance  Toileting: Minimum assistance Instrumental ADL  Meal Preparation: Total assistance  Homemaking: Total assistance  Medication Management: Total assistance  Financial Management: Total assistance   Grooming/Bathing/Dressing Activities of Daily Living     Cognitive Retraining  Safety/Judgement: Fall prevention   Upper Body Bathing  Bathing Assistance: Min A   Position Performed: Seated in chair                  Lower Body Dressing Assistance  Socks:  Total assistance (dependent) Bed/Mat Mobility  Rolling: Contact guard assistance  Supine to Sit: Contact guard assistance  Sit to Supine: Contact guard assistance  Sit to Stand: Contact guard assistance  Stand to Sit: Contact guard assistance  Scooting: Minimum assistance     Most Recent Physical Functioning:   Gross Assessment:  AROM: (limited AROM in the L UE)  PROM: Generally decreased, functional(L UE)  Strength: (grossly 2-/5 to trace movement in L UE)  Coordination: (non-functional in L UE)  Tone: Abnormal(hypotonic in L UE)  Sensation: Intact(reports intact grossly to light touch)               Posture:     Balance:  Sitting: Impaired  Sitting - Static: Good (unsupported)  Sitting - Dynamic: Fair (occasional)  Standing: Impaired  Standing - Static: Good  Standing - Dynamic : Fair Bed Mobility:  Rolling: Contact guard assistance  Supine to Sit: Contact guard assistance  Sit to Supine: Contact guard assistance  Scooting: Minimum assistance  Wheelchair Mobility: Transfers:  Sit to Stand: Contact guard assistance  Stand to Sit: Contact guard assistance            Patient Vitals for the past 6 hrs:   BP BP Patient Position SpO2 Pulse   04/15/20 1200 118/78 At rest 98 % 83   04/15/20 1600 111/83 At rest 98 % 76       Mental Status  Neurologic State: Alert  Orientation Level: Oriented X4  Cognition: Decreased attention/concentration, Follows commands  Perception: Cues to attend to left side of body  Perseveration: No perseveration noted  Safety/Judgement: Fall prevention                          Physical Skills Involved:  1. Range of Motion  2. Balance  3. Strength  4. Activity Tolerance  5. Fine Motor Control  6. Gross Motor Control Cognitive Skills Affected (resulting in the inability to perform in a timely and safe manner):  1. Perception  2. Expression Psychosocial Skills Affected:  1. Habits/Routines  2. Environmental Adaptation  3. Social Interaction  4. Emotional Regulation  5. Self-Awareness  6. Awareness of Others  7. Social Roles   Number of elements that affect the Plan of Care: 5+:  HIGH COMPLEXITY   CLINICAL DECISION MAKIN[address] [place] [id number] AM-PAC 6 Clicks   Daily Activity Inpatient Short Form  How much help from another person does the patient currently need. .. Total A Lot A Little None   1. Putting on and taking off regular lower body clothing? [] 1   [x] 2   [] 3   [] 4   2. Bathing (including washing, rinsing, drying)? [] 1   [] 2   [x] 3   [] 4   3. Toileting, which includes using toilet, bedpan or urinal?   [] 1   [] 2   [x] 3   [] 4   4. Putting on and taking off regular upper body clothing? [] 1   [] 2   [x] 3   [] 4   5. Taking care of personal grooming such as brushing teeth? [] 1   [] 2   [x] 3   [] 4   6. Eating meals? [] 1   [] 2   [x] 3   [] 4   © , Trustees of [address] [place] [address] [address] [id number], under license to [employer].  All rights reserved      Score:  Initial: 11 Most Recent: 17 (20)    Interpretation of Tool:  Represents activities that are increasingly more difficult (i.e. Bed mobility, Transfers, Gait). Medical Necessity:     · Patient demonstrates   · good and excellent  ·  rehab potential due to higher previous functional level. Reason for Services/Other Comments:  · Patient continues to require skilled intervention due to   · Decreased independence with ADL/functional transfers that impacts overall quality of life. · .   Use of outcome tool(s) and clinical judgement create a POC that gives a: MODERATE COMPLEXITY         TREATMENT:   (In addition to Assessment/Re-Assessment sessions the following treatments were rendered)     Pre-treatment Symptoms/Complaints:  \"Can we work on my nails. \"  Pain: Initial:   Pain Intensity 1: 0 Post Session: Unchanged   Therapeutic Exercise: (10 minutes):  Exercises per grid below to improve mobility, strength, balance and coordination. Required moderate visual, verbal, manual and tactile cues to promote proper body alignment, promote proper body posture and promote proper body mechanics. Progressed repetitions and complexity of movement as indicated. Date:  4/15/20 Date:   Date:     Activity/Exercise Parameters Parameters Parameters   Shoulder flexion SROM/AAROM 15 reps with L UE supported at the elbow by R UE (not past 90 degrees)     Elbow flexion/extension SROM/AAROM 15 reps      Forearm supination/pronation  12 reps additional time     Wrist extension  To ~15-20 degrees for 2 sets x 10 reps      Finger flexion/extension  AAROM for extension with end range stretch x 10 reps                          Neuromuscular Re-education: ( 15 minutes):  Exercise/activities per grid below to improve balance, coordination and posture. Required moderate visual, verbal, manual and tactile cues to promote dynamic balance in standing and promote motor control of left, upper extremity(s).            Date:  4/7/20 Date:  4/14/20  Date:  4/15/20   Activity/Exercise Parameters Parameters Parameters Midline orientation sit to stand  Moderate cues and facilitation to decrease rotation at trunk and for midline trunk position Moderate facilitation at the L LE and for decreased rotation at the trunk    Midline sit to squat   Min to mod A w/ hands in his lap     Weightbearing into L side standing at sink  Minimal facilitation at the L UE; ~10 reps      Forward reach with cane 10 reps with facilitation at the elbow/scapula  10 reps with moderate facilitation at the elbow/scapula 15 reps with moderate facilitation at the elbow/scapula into protraction    External rotation with cane 10 reps with minimal facilitation  10 reps with minimal facilitation  15 reps with SBA/CGA    Posture  Upright posture with thoracic extension and B hands place in the lap  Upright sitting/standing with verbal/visual cueing  Pt encouraged for upright posture with moderate cues throughout session    Weightbearing into elbow   10 reps with moderate assistance pushing up into midline  2 sets with prolonged weight bearing and reaching to the L of midline for 2 sets x 15 reps    Weightbearing into hand  Mod to maximal assistance with facilitation at the elbow; 2 sets x 10 reps     Elbow flexion  10 reps AAROM; 10 reps holding ball in B hands     Grasp release of washcloth   10 reps with moderate to maximal facilitation for reaching     Trunk Rotation    15 reps with minima facilitation    Side Scooting    Minimal facilitation and assistance for positioning and weightbearing of L Hand through his L knee and forward forward flexion at the trunk      Braces/Orthotics/Lines/Etc:   · O2 device: Room air  · LUE sling  Treatment/Session Assessment:    · Response to Treatment:  Pt with increased assist needed for bed mobility today.   · Interdisciplinary Collaboration:   o Occupational Therapist  o Registered Nurse  · After treatment position/precautions:   o up to wheelchair with mobility team    · Compliance with Program/Exercises: Compliant all of the time, Will assess as treatment progresses. · Recommendations/Intent for next treatment session: \"Next visit will focus on advancements to more challenging activities and reduction in assistance provided\".   Total Treatment Duration:   OT Patient Time In/Time Out  Time In: 1620  Time Out: [address]

## 2020-04-15 NOTE — DIABETES MGMT
Patient admitted with acute embolic stroke. Admitting blood glucose 144. HbA1c 8.4 (eAG 194). Per chart review appears patient was transitioned from Lantus/Humalog SSI to Metformin 500mg BID in January of 2020. Per chart review it appears patient is awaiting placement and has medicaid pending. Blood glucose yesterday morning was 111. No FSBS available today. Spoke with provider, discussed patient A1c on admission and history. Patient has diabetic diet ordered with glucerna supplements at each meal. New orders received for POC glucose checks ACHS and Humalog ACHS to ensure patient regimen is working and patient glucose levels remain in target goals with caloric intake. Plan to follow up with patient later today to assess educational needs.

## 2020-04-15 NOTE — PROGRESS NOTES
Problem: Mobility Impaired (Adult and Pediatric)  Goal: *Acute Goals and Plan of Care  Description  GOALS UPDATED ON RE-ASSESSMENT 4/1/2020 (advancements to goals in bold):  1. Patient will perform bed mobility with supervision and 0 verbal cues within 7 treatment days. 2. Patient will perform transfer bed to chair with SUPERVISION within 7 treatment days. 3. Patient will demonstrate fair+ dynamic balance throughout stance phase on L LE within 7 treatment days. 4. Patient will require STAND BY ASSIST throughout swing phase on (to advance) L LE within 7 treatment days. 5. Patient demonstrate 3+/5 strength in L LE hip flexion, hip abduction, and hip adduction within 7 treatment days. 6. Patient will ambulate 200ft+ with STAND BY ASSIST and least retrictive assistive device within 7 treatment days. 7. Patient will perform wheelchair mobility x150ft with MODIFIED INDEPENDENCE within 7 treatment days. 8. Mr. Annie Sykes will be independent with wheelchair locking/unlocking mechanism as well as leg rest manipulation within 7 days to improve safety and independence. 9. Mr. Annie Sykes will don/doff LUE sling for protection of L shoulder during transfers/gait within 7 treatment days. PHYSICAL THERAPY: Daily Note and PM 4/15/2020  INPATIENT: PT Visit Days : 4  Payor: MEDICAID PENDING / Plan: [employer] PENDING / Product Type: Medicaid /       NAME/AGE/GENDER: Jade Monge is a 53 y.o. male   PRIMARY DIAGNOSIS: Acute ischemic stroke ([address] [I63.9]  Cerebrovascular accident (CVA) due to embolism ([address] [R23.1] Acute embolic stroke ([address] Acute embolic stroke ([address]       ICD-10: Treatment Diagnosis:   · Difficulty in walking, Not elsewhere classified (R26.2)  · Hemiplegia and hemiparesis following cerebral infarction affecting left non-dominant side (N40.098)   Precaution/Allergies:  Patient has no known allergies. ASSESSMENT:     Mr. Annie Sykes is a 53year old admitted R MCA CVA.   Treatment today included transfer sit to stand with CGA, ambulation 400  with rolling walker (and  follow for safety) with CGA. Gait pattern includes, slow, step-through hemipalegic pattern using L adductor to advance L LE and sliding L foot forward with no jim with hyperextention slam on occasion, mildly narrowed stance and increased weight bearing on R LE. Pnt required frequent visual/verbal/manual cues to center walker use to body and to minimze compensatory left hip circumduction and external rotation. Static standing balance remains good and dynamic standing balance remains fair. Good participation today. This section established at most recent assessment   PROBLEM LIST (Impairments causing functional limitations):  1. Decreased Strength  2. Decreased ADL/Functional Activities  3. Decreased Transfer Abilities  4. Decreased Ambulation Ability/Technique  5. Decreased Balance  6. Increased Pain  7. Decreased Activity Tolerance  8. Increased Fatigue  9. Decreased Flexibility/Joint Mobility   INTERVENTIONS PLANNED: (Benefits and precautions of physical therapy have been discussed with the patient.)  1. Balance Exercise  2. Bed Mobility  3. Family Education  4. Gait Training  5. Home Exercise Program (HEP)  6. Manual Therapy  7. Neuromuscular Re-education/Strengthening  8. Range of Motion (ROM)  9. Therapeutic Activites  10. Therapeutic Exercise/Strengthening  11. Transfer Training     TREATMENT PLAN: Frequency/Duration: 3 times a week for duration of hospital stay  Rehabilitation Potential For Stated Goals: Good     REHAB RECOMMENDATIONS (at time of discharge pending progress):    Placement:   It is my opinion, based on this patient's performance to date, that Mr. Kyung Cisse may benefit from intensive therapy at an [address] [place] [address] after discharge due to a probable need for close medical supervision by a rehab physician, a probable need for multiple therapy disciplines and potential to make ongoing and sustainable functional improvement that is of practical value. .  Equipment:    TBD pending progress with therapy. HISTORY:   History of Present Injury/Illness (Reason for Referral):  Per H&P: \"Pt is a 54 y/o smoker with DM, HTN, who presented to ER with L leg and arm weakness, L facial droop, dysarthria. First noted L leg weakness late night 12/11 when he woke up to go to the bathroom. He was normal when he went to bed around 1030. Woke up this morning and had persistent weakness L leg and also now noted in L arm. EMS called. Noted to have slurred speech and L facial droop as well. Code S called in ER around 9am.  MRI with acute infarcts in L cerebellar hemisphere, deep frontoparietal white matter, and R paramedian yariel. Also noted old lacunar infarcts. No large vessel occlusion on CTA, but some stenosis noted. No hx afib, TIA, CVA. No CP, palpitations, SOB. \"  Past Medical History/Comorbidities:   Mr. Anjel Gil  has a past medical history of Acute ischemic stroke ([address] (12/12/2019), Acute pancreatitis (11/19/2014), Cerebrovascular accident (CVA) due to embolism ([address] (12/12/2019), Diabetes ([address] (2002), Diabetes ([address] Diabetes mellitus, and Hypertension. He also has no past medical history of Arthritis, Asthma, Autoimmune disease ([address] CAD (coronary artery disease), Cancer ([address] Chronic kidney disease, COPD, Dementia, Dementia ([address] Heart failure ([address] Ill-defined condition, Infectious disease, Liver disease, Other ill-defined conditions(799.89), Psychiatric disorder, PUD (peptic ulcer disease), Seizures ([address] or Sleep disorder. Mr. Anjel Gil  has a past surgical history that includes hx hernia repair and hx orthopaedic.   Social History/Living Environment:   Home Environment: Apartment  # Steps to Enter: 12  Rails to Enter: Yes  [employer] : Bilateral  One/Two Story Residence: One story  Living Alone: No  Support Systems: Spouse/Significant Other/Partner  Patient Expects to be Discharged to[de-identified] Unknown  Current DME Used/Available at Home: None  Tub or Shower Type: Tub/Shower combination  Prior Level of Function/Work/Activity:  Independent, lives with wife in 2nd story 1 level apartment. No recent falls. Number of Personal Factors/Comorbidities that affect the Plan of Care: 1-2: MODERATE COMPLEXITY   EXAMINATION:   Most Recent Physical Functioning:   Gross Assessment:  AROM: Generally decreased, functional  PROM: Generally decreased, functional  Strength: Generally decreased, functional  Coordination: Generally decreased, functional  Tone: Abnormal               Posture:     Balance:  Sitting: Impaired  Sitting - Static: Good (unsupported)  Sitting - Dynamic: Fair (occasional)  Standing: Impaired  Standing - Static: Good  Standing - Dynamic : Fair Bed Mobility:  Supine to Sit: Minimum assistance  Sit to Supine: Minimum assistance  Scooting: Minimum assistance  Wheelchair Mobility:     Transfers:  Sit to Stand: Contact guard assistance  Stand to Sit: Contact guard assistance  Gait:     Base of Support: Shift to right;Narrowed  Speed/Clotilde: Slow  Step Length: Left shortened  Swing Pattern: Right asymmetrical  Stance: Left decreased  Gait Abnormalities: Hemiplegic  Distance (ft): 400 Feet (ft)  Assistive Device: Walker, rolling  Ambulation - Level of Assistance: Contact guard assistance  Duration: 14 Minutes      Body Structures Involved:  1. Nerves  2. Voice/Speech  3. Bones  4. Joints  5. Muscles Body Functions Affected:  1. Mental  2. Sensory/Pain  3. Neuromusculoskeletal  4. Movement Related Activities and Participation Affected:  1. General Tasks and Demands  2. Mobility  3. Self Care  4.  Interpersonal Interactions and Relationships   Number of elements that affect the Plan of Care: 4+: HIGH COMPLEXITY   CLINICAL PRESENTATION:   Presentation: Evolving clinical presentation with changing clinical characteristics: MODERATE COMPLEXITY   CLINICAL DECISION MAKING:   Mavis Basic Mobility Inpatient Short Form  How much difficulty does the patient currently have. .. Unable A Lot A Little None   1. Turning over in bed (including adjusting bedclothes, sheets and blankets)? [] 1   [] 2   [] 3   [x] 4   2. Sitting down on and standing up from a chair with arms ( e.g., wheelchair, bedside commode, etc.)   [] 1   [] 2   [x] 3   [] 4   3. Moving from lying on back to sitting on the side of the bed? [] 1   [] 2   [] 3   [x] 4   How much help from another person does the patient currently need. .. Total A Lot A Little None   4. Moving to and from a bed to a chair (including a wheelchair)? [] 1   [] 2   [x] 3   [] 4   5. Need to walk in hospital room? [] 1   [] 2   [x] 3   [] 4   6. Climbing 3-5 steps with a railing? [] 1   [x] 2   [] 3   [] 4   © 2007, Trustees of [address] [place] [address], under license to [employer]. All rights reserved      Score:  Initial: 13 Most Recent: 19 (Date: 4/10/2020)    Interpretation of Tool:  Represents activities that are increasingly more difficult (i.e. Bed mobility, Transfers, Gait). Medical Necessity:     · Patient is expected to demonstrate progress in   · strength, range of motion, balance, coordination, and functional technique  ·  to   · increase independence with all mobility. · .  Reason for Services/Other Comments:  · Patient continues to require skilled intervention due to   · medical complications and mobility deficits which impact his level of function, safety, and independence as indicated above. · .   Use of outcome tool(s) and clinical judgement create a POC that gives a: Questionable prediction of patient's progress: MODERATE COMPLEXITY        TREATMENT:      Pre-treatment Symptoms/Complaints:  \" I'm ready to walk\"  Pain: Initial: 0/10 Post Session:  0/10     Therapeutic Activity: (     Minutes):   Therapeutic activities including bed mobility training, transfer training, ambulation on level ground with cues for gait mechanics, wheelchair mobility,, and patient education  to improve mobility, strength and balance. Required moderate   to promote static and dynamic balance in standing and promote coordination of bilateral, lower extremity(s). Gait Training (14 Minutes):  Gait training to improve and/or restore physical functioning as related to mobility, strength, balance and coordination. Ambulated 400 Feet (ft) with Contact guard assistance using a Walker, rolling and required verbal/visual/tactile cues related to their stance phase, stride length and use of hemiwalker in coordination with hemiparetic side. to promote proper body alignment, promote proper body posture and promote proper body mechanics. Therapeutic Exercise: (9 Minutes):  Exercises per grid below to improve mobility, strength, balance and coordination. Required min visual, verbal, manual and tactile cues to promote proper body alignment, promote proper body posture and promote proper body mechanics. Progressed resistance, range, repetitions and complexity of movement as indicated. Date:  4/15/2020 Date:   Date:     Activity/Exercise Parameters Parameters Parameters   Standing Hip Abduction X 10 B     LAQs X 10 B                                             Braces/Orthotics/Lines/Etc:   · Sling to LUE  Treatment/Session Assessment:    · Response to Treatment:  See above  · Interdisciplinary Collaboration:   o Physical Therapist  o Registered Nurse  · After treatment position/precautions:   o Supine in bed  o Call light within reach  o RN notified   · Compliance with Program/Exercises: Compliant all of the time  · Recommendations/Intent for next treatment session: \"Next visit will focus on advancements to more challenging activities and reduction in assistance provided\".     Total Treatment Duration:  PT Patient Time In/Time Out  Time In: 1453  Time Out: [address]

## 2020-04-16 LAB
EST. AVERAGE GLUCOSE BLD GHB EST-MCNC: 151 MG/DL
GLUCOSE BLD STRIP.AUTO-MCNC: 127 MG/DL (ref 65–100)
GLUCOSE BLD STRIP.AUTO-MCNC: 148 MG/DL (ref 65–100)
GLUCOSE BLD STRIP.AUTO-MCNC: 182 MG/DL (ref 65–100)
GLUCOSE BLD STRIP.AUTO-MCNC: 87 MG/DL (ref 65–100)
HBA1C MFR BLD: 6.9 % (ref 4.8–6)

## 2020-04-16 PROCEDURE — 97110 THERAPEUTIC EXERCISES: CPT

## 2020-04-16 PROCEDURE — 97535 SELF CARE MNGMENT TRAINING: CPT

## 2020-04-16 PROCEDURE — 36415 COLL VENOUS BLD VENIPUNCTURE: CPT

## 2020-04-16 PROCEDURE — 97116 GAIT TRAINING THERAPY: CPT | Performed by: PHYSICAL THERAPIST

## 2020-04-16 PROCEDURE — 97112 NEUROMUSCULAR REEDUCATION: CPT

## 2020-04-16 PROCEDURE — 74011250637 HC RX REV CODE- 250/637: Performed by: NURSE PRACTITIONER

## 2020-04-16 PROCEDURE — 74011636637 HC RX REV CODE- 636/637: Performed by: NURSE PRACTITIONER

## 2020-04-16 PROCEDURE — 82962 GLUCOSE BLOOD TEST: CPT

## 2020-04-16 PROCEDURE — 65270000029 HC RM PRIVATE

## 2020-04-16 PROCEDURE — 74011250637 HC RX REV CODE- 250/637: Performed by: INTERNAL MEDICINE

## 2020-04-16 PROCEDURE — 74011250636 HC RX REV CODE- 250/636: Performed by: INTERNAL MEDICINE

## 2020-04-16 PROCEDURE — 83036 HEMOGLOBIN GLYCOSYLATED A1C: CPT

## 2020-04-16 PROCEDURE — 74011250637 HC RX REV CODE- 250/637: Performed by: FAMILY MEDICINE

## 2020-04-16 PROCEDURE — 74011250637 HC RX REV CODE- 250/637: Performed by: HOSPITALIST

## 2020-04-16 RX ORDER — METFORMIN HYDROCHLORIDE 500 MG/1
500 TABLET ORAL 2 TIMES DAILY WITH MEALS
Status: DISCONTINUED | OUTPATIENT
Start: 2020-04-16 | End: 2020-06-10 | Stop reason: HOSPADM

## 2020-04-16 RX ADMIN — ATORVASTATIN CALCIUM 80 MG: 80 TABLET, FILM COATED ORAL at 21:47

## 2020-04-16 RX ADMIN — ENOXAPARIN SODIUM 40 MG: 40 INJECTION SUBCUTANEOUS at 15:53

## 2020-04-16 RX ADMIN — DIPHENHYDRAMINE HYDROCHLORIDE 25 MG: 25 CAPSULE ORAL at 17:17

## 2020-04-16 RX ADMIN — ACETAMINOPHEN 650 MG: 325 TABLET, FILM COATED ORAL at 21:47

## 2020-04-16 RX ADMIN — METOPROLOL SUCCINATE 25 MG: 25 TABLET, FILM COATED, EXTENDED RELEASE ORAL at 08:00

## 2020-04-16 RX ADMIN — GUAIFENESIN 100 MG: 100 SOLUTION ORAL at 17:17

## 2020-04-16 RX ADMIN — LISINOPRIL 40 MG: 20 TABLET ORAL at 08:00

## 2020-04-16 RX ADMIN — ACETAMINOPHEN 650 MG: 325 TABLET, FILM COATED ORAL at 06:15

## 2020-04-16 RX ADMIN — INSULIN LISPRO 2 UNITS: 100 INJECTION, SOLUTION INTRAVENOUS; SUBCUTANEOUS at 07:59

## 2020-04-16 RX ADMIN — ASPIRIN 81 MG 81 MG: 81 TABLET ORAL at 08:00

## 2020-04-16 RX ADMIN — ACETAMINOPHEN 650 MG: 325 TABLET, FILM COATED ORAL at 15:52

## 2020-04-16 RX ADMIN — METFORMIN HYDROCHLORIDE 500 MG: 500 TABLET ORAL at 17:17

## 2020-04-16 RX ADMIN — Medication 400 MG: at 08:00

## 2020-04-16 RX ADMIN — METFORMIN HYDROCHLORIDE 500 MG: 500 TABLET ORAL at 08:00

## 2020-04-16 NOTE — PROGRESS NOTES
Disability determination and medicaid remains pending. SW to continue to follow to assist as needed.

## 2020-04-16 NOTE — DIABETES MGMT
Patient seen for follow up diabetes education. Educated patient regarding current glycemic control and target blood glucose levels. Noted HbA1c 6.9 (eaG 151). Patient given educational handout regarding over-the-counter glucometer options as a more affordable option when uninsured. Also patient give DSME contact information for uninsured patients. Reviewed patient current regimen: Metformin 500mg BID and Humalog SSI. No insulin instruction needed at this time. Reviewed the importance of compliance with diabetic regimen. Patient verbalized understanding and voices no further questions at this time. Patient planning to take a nap at this time. Will continue to follow along.

## 2020-04-16 NOTE — PROGRESS NOTES
Hospitalist Progress Note     Admit Date:  2019  9:07 AM   Name:  Jonathon Moss   Age:  53 y.o.  :  1975   MRN:  [id number]   PCP:  Joy Cueva MD  Treatment Team: Attending Provider: Cherry Schultz MD; Care Manager: Neena Zapata [facility] [place]; Utilization Review: Jomar Lobato RN; Nurse Practitioner: Marla Tatum NP; Care Manager: Matt Neves RN; Charge Nurse: Varsha Romano; Physical Therapist: Alanna Cortes Primary Nurse: Nicki Eaton    Subjective:   HPI and or CC:  54 yo AA male with PMH of DM, HTN admitted  for CVA affecting numerous areas of the brain. He was placed on ASA and statin. He has remained alert and oriented x3. Some movement to right side but unable to grasp with right hand. He is currently waiting on placement and this has remained difficult due to waiting on Medicaid. : On RA with saturations mid 90s. Continues to wait on SSI and Medicaid approval.  Alert and oriented x3. Voices no complaints today. Continues to wait for placement. He remains afebrile, no leukocytosis. NIH remains 7. Objective:     Patient Vitals for the past 24 hrs:   Temp Pulse Resp BP SpO2   20 0800 97.3 °F (36.3 °C) 67 17 130/83 98 %   20 0400 97.7 °F (36.5 °C) 75 16 113/74 98 %   20 0000 98.2 °F (36.8 °C) 65 16 116/77 98 %   04/15/20 2000 98 °F (36.7 °C) 72 16 112/72 97 %   04/15/20 1600 98.2 °F (36.8 °C) 76 16 111/83 98 %   04/15/20 1200 98.2 °F (36.8 °C) 83 16 118/78 98 %     Oxygen Therapy  O2 Sat (%): 98 % (20 0800)  Pulse via Oximetry: 75 beats per minute (20 0400)  O2 Device: Room air (04/15/20 1500)  No intake or output data in the 24 hours ending 20 1034      REVIEW OF SYSTEMS: Comprehensive ROS performed and negative except as stated in HPI. Physical Examination:  General:       No acute distress    Lungs:          CTA bilaterally.  Resp even and nonlabored  Heart:            S1S2 present without murmurs rubs gallops. RRR. No LE edema  Abdomen:    Soft, non tender, non distended. BS present  Extremities: No cyanosis. Left sided hemiparesis  Neurologic:  moves right hand and raises arm at elbow moderately, unable to squeeze my fingers left hand, mildly slurred speech.  PERRLA, strength 4/5 RUE/RLE. Data Review:  I have reviewed all labs, meds, telemetry events, and studies from the last 24 hours.     Recent Results (from the past 24 hour(s))   GLUCOSE, POC    Collection Time: 04/15/20  4:48 PM   Result Value Ref Range    Glucose (POC) 157 (H) 65 - 100 mg/dL   GLUCOSE, POC    Collection Time: 04/15/20  9:02 PM   Result Value Ref Range    Glucose (POC) 130 (H) 65 - 100 mg/dL   HEMOGLOBIN A1C WITH EAG    Collection Time: 04/16/20  7:20 AM   Result Value Ref Range    Hemoglobin A1c 6.9 (H) 4.8 - 6.0 %    Est. average glucose 151 mg/dL   GLUCOSE, POC    Collection Time: 04/16/20  7:40 AM   Result Value Ref Range    Glucose (POC) 182 (H) 65 - 100 mg/dL        All Micro Results     None          Current Meds:  Current Facility-Administered Medications   Medication Dose Route Frequency    metFORMIN (GLUCOPHAGE) tablet 500 mg  500 mg Oral BID WITH MEALS    insulin lispro (HUMALOG) injection 0-10 Units  0-10 Units SubCUTAneous AC&HS    magnesium oxide (MAG-OX) tablet 400 mg  400 mg Oral DAILY    acetaminophen (TYLENOL) tablet 650 mg  650 mg Oral Q4H PRN    diphenhydrAMINE (BENADRYL) capsule 25 mg  25 mg Oral Q6H PRN    acetaminophen (TYLENOL) tablet 650 mg  650 mg Oral Q8H    lip protectant (BLISTEX) ointment 1 Each  1 Each Topical PRN    metoprolol succinate (TOPROL-XL) XL tablet 25 mg  25 mg Oral DAILY    polyethylene glycol (MIRALAX) packet 17 g  17 g Oral DAILY PRN    guaiFENesin (ROBITUSSIN) 100 mg/5 mL oral liquid 100 mg  100 mg Oral Q4H PRN    hydrALAZINE (APRESOLINE) 20 mg/mL injection 20 mg  20 mg IntraVENous Q4H PRN    atorvastatin (LIPITOR) tablet 80 mg  80 mg Oral QHS    lisinopril (PRINIVIL, ZESTRIL) tablet 40 mg  40 mg Oral DAILY    sodium chloride (NS) flush 5-40 mL  5-40 mL IntraVENous PRN    ondansetron (ZOFRAN) injection 4 mg  4 mg IntraVENous Q4H PRN    aspirin chewable tablet 81 mg  81 mg Oral DAILY    enoxaparin (LOVENOX) injection 40 mg  40 mg SubCUTAneous Q24H    dextrose 40% (GLUTOSE) oral gel 1 Tube  15 g Oral PRN    glucagon (GLUCAGEN) injection 1 mg  1 mg IntraMUSCular PRN    dextrose (D50W) injection syrg 12.5-25 g  25-50 mL IntraVENous PRN       Diet:  DIET NUTRITIONAL SUPPLEMENTS  DIET DIABETIC CONSISTENT CARB    Other Studies (last 24 hours):  No results found. Assessment and Plan:     Hospital Problems as of 4/16/2020 Date Reviewed: 3/3/2017          Codes Class Noted - Resolved POA    Hypomagnesemia ICD-10-CM: E83.42  ICD-9-CM: 275.2  3/7/2020 - Present Unknown        PFO (patent foramen ovale) ICD-10-CM: Q21.1  ICD-9-CM: 745.5  12/17/2019 - Present Yes        Arrhythmia ICD-10-CM: I49.9  ICD-9-CM: 427.9  12/15/2019 - Present Yes        Hyperlipemia ICD-10-CM: E78.5  ICD-9-CM: 272.4  12/15/2019 - Present Yes        * (Principal) Acute embolic stroke ([facility] [id number]: I63.9  ICD-9-CM: 434.11  12/12/2019 - Present Yes        Hypertension (Chronic) ICD-10-CM: I10  ICD-9-CM: 401.9  Unknown - Present Yes        Type 2 diabetes mellitus (HCC) (Chronic) ICD-10-CM: E11.9  ICD-9-CM: 250.00  Unknown - Present Yes              A/P:    Acute embolic stroke  MRI brain showed acute to early subacute infarcts in the left cerebellar hemisphere, in the periventricular deep left frontoparietal white matter and likely additional areas of restricted diffusion in the right paramedian yariel.    +PFO on echo  On ASA, statin  Will need 4 week event monitor on DC, if no arrhythmia then will need PFO closure      Hypomagnesemia  Resolved       Hypertension  Continue metoprolol and ACE inhibitor  Goal BP <130/80     Type 2 diabetes mellitus:    Monitor, BGL controlled        Signed:  Elisa Brock NP

## 2020-04-16 NOTE — PROGRESS NOTES
's follow-up visit. Mr. Anoop Myers was asleep and I did not wake him. Chaplains remain available for support.      Dontrell Almanza 66  Board Certified

## 2020-04-16 NOTE — DIABETES MGMT
Patient admitted with acute embolic stroke. Blood glucose ranged 130-157 yesterday with patient receiving Metformin 500 mg and Humalog 2 units. Blood glucose this morning was 182. Reviewed patient current regimen: Humalog SSI and Metformin 500mg daily. Spoke with provider discussed patient glycemic control. Provider plans to increase frequency of Metformin doses.

## 2020-04-16 NOTE — PROGRESS NOTES
Problem: Patient Education: Go to Patient Education Activity  Goal: Patient/Family Education  Outcome: Progressing Towards Goal     Problem: Pressure Injury - Risk of  Goal: *Prevention of pressure injury  Description: Document Dewey Scale and appropriate interventions in the flowsheet. Outcome: Progressing Towards Goal  Note: Pressure Injury Interventions:  Sensory Interventions: Assess changes in LOC    Moisture Interventions: Absorbent underpads    Activity Interventions: Increase time out of bed    Mobility Interventions: HOB 30 degrees or less    Nutrition Interventions: Offer support with meals,snacks and hydration    Friction and Shear Interventions: HOB 30 degrees or less                Problem: Patient Education: Go to Patient Education Activity  Goal: Patient/Family Education  Outcome: Progressing Towards Goal     Problem: Falls - Risk of  Goal: *Absence of Falls  Description: Document Jeanne Fall Risk and appropriate interventions in the flowsheet. Outcome: Progressing Towards Goal  Note: Fall Risk Interventions:  Mobility Interventions: Bed/chair exit alarm    Mentation Interventions: Bed/chair exit alarm    Medication Interventions: Bed/chair exit alarm    Elimination Interventions: Call light in reach    History of Falls Interventions: Bed/chair exit alarm         Problem: Patient Education: Go to Patient Education Activity  Goal: Patient/Family Education  Outcome: Progressing Towards Goal     Problem: Diabetes Self-Management  Goal: *Disease process and treatment process  Description: Define diabetes and identify own type of diabetes; list 3 options for treating diabetes. Outcome: Progressing Towards Goal  Goal: *Incorporating nutritional management into lifestyle  Description: Describe effect of type, amount and timing of food on blood glucose; list 3 methods for planning meals.   Outcome: Progressing Towards Goal  Goal: *Incorporating physical activity into lifestyle  Description: State effect of exercise on blood glucose levels. Outcome: Progressing Towards Goal  Goal: *Developing strategies to promote health/change behavior  Description: Define the ABC's of diabetes; identify appropriate screenings, schedule and personal plan for screenings. Outcome: Progressing Towards Goal  Goal: *Using medications safely  Description: State effect of diabetes medications on diabetes; name diabetes medication taking, action and side effects. Outcome: Progressing Towards Goal  Goal: *Monitoring blood glucose, interpreting and using results  Description: Identify recommended blood glucose targets  and personal targets. Outcome: Progressing Towards Goal  Goal: *Prevention, detection, treatment of acute complications  Description: List symptoms of hyper- and hypoglycemia; describe how to treat low blood sugar and actions for lowering  high blood glucose level. Outcome: Progressing Towards Goal  Goal: *Prevention, detection and treatment of chronic complications  Description: Define the natural course of diabetes and describe the relationship of blood glucose levels to long term complications of diabetes.   Outcome: Progressing Towards Goal  Goal: *Developing strategies to address psychosocial issues  Description: Describe feelings about living with diabetes; identify support needed and support network  Outcome: Progressing Towards Goal     Problem: Patient Education: Go to Patient Education Activity  Goal: Patient/Family Education  Outcome: Progressing Towards Goal     Problem: Nutrition Deficit  Goal: *Optimize nutritional status  Outcome: Progressing Towards Goal     Problem: General Medical Care Plan  Goal: *Vital signs within specified parameters  Outcome: Progressing Towards Goal  Goal: *Labs within defined limits  Outcome: Progressing Towards Goal  Goal: *Absence of infection signs and symptoms  Description: Wash hand more often   Outcome: Progressing Towards Goal  Goal: *Optimal pain control at patient's stated goal  Outcome: Progressing Towards Goal  Goal: *Skin integrity maintained  Outcome: Progressing Towards Goal  Goal: *Fluid volume balance  Outcome: Progressing Towards Goal  Goal: *Optimize nutritional status  Outcome: Progressing Towards Goal  Goal: *Anxiety reduced or absent  Outcome: Progressing Towards Goal  Goal: *Progressive mobility and function (eg: ADL's)  Outcome: Progressing Towards Goal     Problem: Patient Education: Go to Patient Education Activity  Goal: Patient/Family Education  Outcome: Progressing Towards Goal     Problem: Patient Education: Go to Patient Education Activity  Goal: Patient/Family Education  Outcome: Progressing Towards Goal     Problem: Patient Education: Go to Patient Education Activity  Goal: Patient/Family Education  Outcome: Progressing Towards Goal     Problem: Patient Education: Go to Patient Education Activity  Goal: Patient/Family Education  Outcome: Progressing Towards Goal     Problem: Ischemic Stroke: Discharge Outcomes  Goal: *Verbalizes anxiety and depression are reduced or absent  Outcome: Progressing Towards Goal  Goal: *Verbalize understanding of risk factor modification(Stroke Metric)  Outcome: Progressing Towards Goal  Goal: *Hemodynamically stable  Outcome: Progressing Towards Goal  Goal: *Absence of aspiration pneumonia  Outcome: Progressing Towards Goal  Goal: *Aware of needed dietary changes  Outcome: Progressing Towards Goal  Goal: *Verbalize understanding of prescribed medications including anti-coagulants, anti-lipid, and/or anti-platelets(Stroke Metric)  Outcome: Progressing Towards Goal  Goal: *Tolerating diet  Outcome: Progressing Towards Goal  Goal: *Aware of follow-up diagnostics related to anticoagulants  Outcome: Progressing Towards Goal  Goal: *Ability to perform ADLs and demonstrates progressive mobility and function  Outcome: Progressing Towards Goal  Goal: *Absence of DVT(Stroke Metric)  Outcome: Progressing Towards Goal  Goal: *Absence of aspiration  Outcome: Progressing Towards Goal  Goal: *Optimal pain control at patient's stated goal  Outcome: Progressing Towards Goal  Goal: *Home safety concerns addressed  Outcome: Progressing Towards Goal  Goal: *Describes available resources and support systems  Outcome: Progressing Towards Goal  Goal: *Verbalizes understanding of activation of EMS(911) for stroke symptoms(Stroke Metric)  Outcome: Progressing Towards Goal  Goal: *Understands and describes signs and symptoms to report to providers(Stroke Metric)  Outcome: Progressing Towards Goal  Goal: *Neurolgocially stable (absence of additional neurological deficits)  Outcome: Progressing Towards Goal  Goal: *Verbalizes importance of follow-up with primary care physician(Stroke Metric)  Outcome: Progressing Towards Goal  Goal: *Smoking cessation discussed,if applicable(Stroke Metric)  Outcome: Progressing Towards Goal  Goal: *Depression screening completed(Stroke Metric)  Outcome: Progressing Towards Goal     Problem: Pain  Goal: *Control of Pain  Outcome: Progressing Towards Goal     Problem: Patient Education: Go to Patient Education Activity  Goal: Patient/Family Education  Outcome: Progressing Towards Goal

## 2020-04-16 NOTE — PROGRESS NOTES
Problem: Mobility Impaired (Adult and Pediatric)  Goal: *Acute Goals and Plan of Care  Description  GOALS UPDATED ON RE-ASSESSMENT 4/1/2020 (advancements to goals in bold):  1. Patient will perform bed mobility with supervision and 0 verbal cues within 7 treatment days. 2. Patient will perform transfer bed to chair with SUPERVISION within 7 treatment days. 3. Patient will demonstrate fair+ dynamic balance throughout stance phase on L LE within 7 treatment days. 4. Patient will require STAND BY ASSIST throughout swing phase on (to advance) L LE within 7 treatment days. 5. Patient demonstrate 3+/5 strength in L LE hip flexion, hip abduction, and hip adduction within 7 treatment days. 6. Patient will ambulate 200ft+ with STAND BY ASSIST and least retrictive assistive device within 7 treatment days. 7. Patient will perform wheelchair mobility x150ft with MODIFIED INDEPENDENCE within 7 treatment days. 8. Mr. Sebastián Ramos will be independent with wheelchair locking/unlocking mechanism as well as leg rest manipulation within 7 days to improve safety and independence. 9. Mr. Sebastián Ramos will don/doff LUE sling for protection of L shoulder during transfers/gait within 7 treatment days. PHYSICAL THERAPY: Daily Note and PM 4/16/2020  INPATIENT: PT Visit Days : 5  Payor: [id number] [place], [place] [address]y [address] / Plan: [address] [place] [address] Avenue / Product Type: Medicaid /       NAME/AGE/GENDER: Avelino Bacon is a 53 y.o. male   PRIMARY DIAGNOSIS: Acute ischemic stroke ([address] [I63.9]  Cerebrovascular accident (CVA) due to embolism ([address] [Y67.2] Acute embolic stroke ([address] Acute embolic stroke ([address]       ICD-10: Treatment Diagnosis:   · Difficulty in walking, Not elsewhere classified (R26.2)  · Hemiplegia and hemiparesis following cerebral infarction affecting left non-dominant side (B60.439)   Precaution/Allergies:  Patient has no known allergies. ASSESSMENT:     Mr. Sebastián Ramos is a 53year old admitted R MCA CVA. Treatment today included supine to sit with supervision, transfer sit to stand with CGA, ambulation 400  with R luke walker (and WC follow for safety) with CGA. Gait pattern includes, slow, step-through hemipalegic pattern using L adductor to advance L LE and sliding L foot forward with no jim with hyperextention slam on occasion, mildly narrowed stance and increased weight bearing on R LE. Pnt required frequent visual/verbal/manual cues to pair luke walker use to L LE swing phase and to minimze compensatory left hip circumduction and external rotation. Pnt with increased fartiuge and mild left leg ataxia, as well as required 2 seated rest breaks. Upon re-entry to the room, pnt performed 2 x 10 LAQ and seated marches with manual cueing to increase L L AROM. Good participation today. This section established at most recent assessment   PROBLEM LIST (Impairments causing functional limitations):  1. Decreased Strength  2. Decreased ADL/Functional Activities  3. Decreased Transfer Abilities  4. Decreased Ambulation Ability/Technique  5. Decreased Balance  6. Increased Pain  7. Decreased Activity Tolerance  8. Increased Fatigue  9. Decreased Flexibility/Joint Mobility   INTERVENTIONS PLANNED: (Benefits and precautions of physical therapy have been discussed with the patient.)  1. Balance Exercise  2. Bed Mobility  3. Family Education  4. Gait Training  5. Home Exercise Program (HEP)  6. Manual Therapy  7. Neuromuscular Re-education/Strengthening  8. Range of Motion (ROM)  9. Therapeutic Activites  10. Therapeutic Exercise/Strengthening  11. Transfer Training     TREATMENT PLAN: Frequency/Duration: 3 times a week for duration of hospital stay  Rehabilitation Potential For Stated Goals: Good     REHAB RECOMMENDATIONS (at time of discharge pending progress):    Placement:   It is my opinion, based on this patient's performance to date, that Mr. Osmar Davey may benefit from intensive therapy at an INPATIENT REHABILITATION FACILITY after discharge due to a probable need for close medical supervision by a rehab physician, a probable need for multiple therapy disciplines and potential to make ongoing and sustainable functional improvement that is of practical value. .  Equipment:    TBD pending progress with therapy. HISTORY:   History of Present Injury/Illness (Reason for Referral):  Per H&P: \"Pt is a 54 y/o smoker with DM, HTN, who presented to ER with L leg and arm weakness, L facial droop, dysarthria. First noted L leg weakness late night 12/11 when he woke up to go to the bathroom. He was normal when he went to bed around 1030. Woke up this morning and had persistent weakness L leg and also now noted in L arm. EMS called. Noted to have slurred speech and L facial droop as well. Code S called in ER around 9am.  MRI with acute infarcts in L cerebellar hemisphere, deep frontoparietal white matter, and R paramedian yariel. Also noted old lacunar infarcts. No large vessel occlusion on CTA, but some stenosis noted. No hx afib, TIA, CVA. No CP, palpitations, SOB. \"  Past Medical History/Comorbidities:   Mr. Antony Murray  has a past medical history of Acute ischemic stroke ([address] (12/12/2019), Acute pancreatitis (11/19/2014), Cerebrovascular accident (CVA) due to embolism ([address] (12/12/2019), Diabetes ([address] (2002), Diabetes ([address] Diabetes mellitus, and Hypertension. He also has no past medical history of Arthritis, Asthma, Autoimmune disease ([address] CAD (coronary artery disease), Cancer ([address] Chronic kidney disease, COPD, Dementia, Dementia ([address] Heart failure ([address] Ill-defined condition, Infectious disease, Liver disease, Other ill-defined conditions(799.89), Psychiatric disorder, PUD (peptic ulcer disease), Seizures ([address] or Sleep disorder. Mr. Antony Murray  has a past surgical history that includes hx hernia repair and hx orthopaedic.   Social History/Living Environment:   Home Environment: Apartment  # Steps to Enter: 12  Rails to Enter: Yes  [employer] : Bilateral  One/Two Story Residence: One story  Living Alone: No  Support Systems: Spouse/Significant Other/Partner  Patient Expects to be Discharged to[de-identified] Unknown  Current DME Used/Available at Home: None  Tub or Shower Type: Tub/Shower combination  Prior Level of Function/Work/Activity:  Independent, lives with wife in 2nd story 1 level apartment. No recent falls. Number of Personal Factors/Comorbidities that affect the Plan of Care: 1-2: MODERATE COMPLEXITY   EXAMINATION:   Most Recent Physical Functioning:   Gross Assessment:  AROM: Generally decreased, functional  PROM: Generally decreased, functional  Strength: Generally decreased, functional  Coordination: Generally decreased, functional  Tone: Abnormal               Posture:     Balance:  Sitting: Intact  Sitting - Static: Good (unsupported)  Sitting - Dynamic: Good (unsupported)  Standing: Impaired  Standing - Static: Good  Standing - Dynamic : Fair Bed Mobility:  Rolling: Supervision  Supine to Sit: Supervision  Wheelchair Mobility:     Transfers:  Sit to Stand: Contact guard assistance  Stand to Sit: Contact guard assistance  Gait:     Base of Support: Shift to right;Narrowed  Speed/Clotilde: Slow  Step Length: Left shortened  Swing Pattern: Right asymmetrical  Stance: Right decreased  Gait Abnormalities: Hemiplegic;Circumduction  Distance (ft): 400 Feet (ft)  Assistive Device: Walker luke  Ambulation - Level of Assistance: Contact guard assistance;Minimal assistance; Additional time  Duration: 25 Minutes      Body Structures Involved:  1. Nerves  2. Voice/Speech  3. Bones  4. Joints  5. Muscles Body Functions Affected:  1. Mental  2. Sensory/Pain  3. Neuromusculoskeletal  4. Movement Related Activities and Participation Affected:  1. General Tasks and Demands  2. Mobility  3. Self Care  4.  Interpersonal Interactions and Relationships   Number of elements that affect the Plan of Care: 4+: HIGH COMPLEXITY CLINICAL PRESENTATION:   Presentation: Evolving clinical presentation with changing clinical characteristics: MODERATE COMPLEXITY   CLINICAL DECISION MAKIN [facility] Mobility Inpatient Short Form  How much difficulty does the patient currently have. .. Unable A Lot A Little None   1. Turning over in bed (including adjusting bedclothes, sheets and blankets)? [] 1   [] 2   [] 3   [x] 4   2. Sitting down on and standing up from a chair with arms ( e.g., wheelchair, bedside commode, etc.)   [] 1   [] 2   [x] 3   [] 4   3. Moving from lying on back to sitting on the side of the bed? [] 1   [] 2   [] 3   [x] 4   How much help from another person does the patient currently need. .. Total A Lot A Little None   4. Moving to and from a bed to a chair (including a wheelchair)? [] 1   [] 2   [x] 3   [] 4   5. Need to walk in hospital room? [] 1   [] 2   [x] 3   [] 4   6. Climbing 3-5 steps with a railing? [] 1   [x] 2   [] 3   [] 4   © , Trustees of [address] [place] [address], under license to [employer]. All rights reserved      Score:  Initial: 13 Most Recent: 19 (Date: 4/10/2020)    Interpretation of Tool:  Represents activities that are increasingly more difficult (i.e. Bed mobility, Transfers, Gait). Medical Necessity:     · Patient is expected to demonstrate progress in   · strength, range of motion, balance, coordination, and functional technique  ·  to   · increase independence with all mobility. · .  Reason for Services/Other Comments:  · Patient continues to require skilled intervention due to   · medical complications and mobility deficits which impact his level of function, safety, and independence as indicated above.    · .   Use of outcome tool(s) and clinical judgement create a POC that gives a: Questionable prediction of patient's progress: MODERATE COMPLEXITY        TREATMENT:      Pre-treatment Symptoms/Complaints:  No complaints   Pain: Initial: 0/10 Post Session:  0/10     Therapeutic Activity: (     Minutes): Therapeutic activities including bed mobility training, transfer training, ambulation on level ground with cues for gait mechanics, wheelchair mobility,, and patient education  to improve mobility, strength and balance. Required moderate   to promote static and dynamic balance in standing and promote coordination of bilateral, lower extremity(s). Gait Training (25 Minutes):  Gait training to improve and/or restore physical functioning as related to mobility, strength, balance and coordination. Ambulated 400 Feet (ft) with Contact guard assistance;Minimal assistance; Additional time using a Walker luke and required verbal/visual/tactile cues related to their stance phase, stride length and use of hemiwalker in coordination with hemiparetic side. to promote proper body alignment, promote proper body posture and promote proper body mechanics. Therapeutic Exercise: ( ):  Exercises per grid below to improve mobility, strength, balance and coordination. Required min visual, verbal, manual and tactile cues to promote proper body alignment, promote proper body posture and promote proper body mechanics. Progressed resistance, range, repetitions and complexity of movement as indicated.      Date:  4/15/2020 Date:  4/16/2020 Date:     Activity/Exercise Parameters Parameters Parameters   Standing Hip Abduction X 10 B     LAQs X 10 B 2 x 10 B    Seated Marhces  2 x 10B                                      Braces/Orthotics/Lines/Etc:   · Sling to LUE  Treatment/Session Assessment:    · Response to Treatment:  See above  · Interdisciplinary Collaboration:   o Physical Therapist  o Registered Nurse  o Rehabilitation Attendant  · After treatment position/precautions:   o Up in chair  o Bed/Chair-wheels locked  o Call light within reach  o Rehabilitation attendant at chairside    · Compliance with Program/Exercises: Compliant all of the time  · Recommendations/Intent for next treatment session: \"Next visit will focus on advancements to more challenging activities and reduction in assistance provided\".     Total Treatment Duration:  PT Patient Time In/Time Out  Time In: 7893  Time Out: C/Georgette Monahan [id number]

## 2020-04-16 NOTE — PROGRESS NOTES
Problem: Self Care Deficits Care Plan (Adult)  Goal: *Acute Goals and Plan of Care (Insert Text)  Description  Goals per Re-evaluation on 3/18/2020:   1. Patient will demonstrate appropriate safety awareness and protection of L UE during bed mobility and functional transfers with minimal cues. (Progressing, still needs cues, 4/14/20)  2. Patient will complete total body bathing and dressing with Min A and adaptive equipment as needed. (Progressing 4/14/20  3. Patient will complete weightbearing into the L UE with ADL tasks with minimal assistance to improve ability to use as a functional assist during ADL tasks. (Progressing 4/14/20)4. Patient will demonstrate L UE SROM HEP within 7 days. (Progressing, 3/18/2020)  5. Pt will complete bed mobility with Mod I and minimal verbal cueing (Progressing, Supervision, 4/14/20). 6. Pt will complete dynamic sitting balance for ADLs at mod I with good balance (Progressing, 4/14/20  7. Pt will complete functional transfers with Supervision with equipment as needed. (Progressing, SBA to CGA 4/14/20)  8. Pt will complete grooming tasks in standing at sink level x5 mins with good balance and equipment as needed MET  9. Pt will complete grooming standing at sink level with SBA and use of adaptive equipment as needed. MET  10. Pt will don/doff UE sling with SBA and use of minimal verbal and visual cueing for correct application (Progressing, Min A 4/14/20. Goals per Re-evaluation on 3/27/2020:   1. Patient will demonstrate appropriate safety awareness and protection of L UE during bed mobility and functional transfers with no verbal cues. CONTINUE   2. Patient will complete lower body bathing and dressing with Min A and adaptive equipment as needed. CONTINUE  3. Patient will complete upper body bathing and dressing with SBA and adaptive equipment as needed.  CONTINUE  4. Patient will complete weightbearing into the L UE with ADL tasks with minimal assistance to improve ability to use as a functional assist during ADL tasks. CONTINUE  5. Patient will demonstrate L UE SROM HEP within 7 days. CONTINUE  6. Pt will complete bed mobility with Mod I and no verbal cueing. CONTINUE  7. Pt will complete functional transfers with Supervision with equipment as needed. CONTINUE  8. Pt will don/doff UE sling with Mod I and no verbal cueing. CONTINUE  9. Pt will complete toileting with SBA for toilet transfer and gown management. CONTINUE  10. Patient will participate in using L UE as a functional assist for ADL for 15 minutes with minimal facilitation. NEW GOAL 4/14/20  11. Patient will complete sit to stand at midline with minimal assistance and cueing. NEW GOAL 4/14/20  12. Patient will complete therapeutic exercises with L UE with minimal tactile cues for facilitation of the LUE. NeW GOAL 4/14/20    Outcome: Progressing Towards Goal     OCCUPATIONAL THERAPY: Daily Note and PM    4/16/2020  INPATIENT: OT Visit Days: 3  [facility] [id number] [facility] [address] / Plan: [place] / Product Type: Medicaid /      NAME/AGE/GENDER: Avelino Bacon is a 53 y.o. male   PRIMARY DIAGNOSIS:  Acute ischemic stroke ([address] [I63.9]  Cerebrovascular accident (CVA) due to embolism ([address] [M21.6] Acute embolic stroke ([address] Acute embolic stroke ([address]       ICD-10: Treatment Diagnosis:    · Generalized Muscle Weakness (M62.81)  · Other lack of cordination (R27.8)  · Hemiplegia and hemiparesis following cerebral infarction affecting   · left non-dominant side (I69.354)  · Abnormal posture (R29.3)   Precautions/Allergies:    NO PULLING ON LUE   LUE in sling with shoulder joint approximated and supported, needs taping   Patient has no known allergies. ASSESSMENT:     Mr. Sebastián Ramos presents to the hospital with L sided weakness and acute ischemic CVA. MRI revealed acute to subacute L cerebellar and R paramedian yariel infarcts. 4/16/2020: Pt was supine in the bed. Pt agreeable to working with therapy.  Pt worked on functional mobility to the sink using rolling walker. Pt educated on using L hand as functional stabilizer to open toothpaste and to stabilize toothbrush while putting toothpaste on the brush. Pt encouraged to complete weightbearing through L UE while standing at the sink at midline. Pt returned back to the bed and completed functional activities/exercises at the edge of the bed. Pt with fairly flat affect throughout the session. Pt participated with exercises but doesn't really provide much feedback throughout. Continue per plan of care. This section established at most recent assessment   PROBLEM LIST (Impairments causing functional limitations):  1. Decreased Strength  2. Decreased ADL/Functional Activities  3. Decreased Transfer Abilities  4. Decreased Ambulation Ability/Technique  5. Decreased Balance  6. Decreased Activity Tolerance  7. Increased Fatigue  8. Decreased Flexibility/Joint Mobility  9. Decreased Knowledge of Precautions  10. Decreased [place] with Home Exercise Program   INTERVENTIONS PLANNED: (Benefits and precautions of occupational therapy have been discussed with the patient.)  1. Activities of daily living training  2. Adaptive equipment training  3. Balance training  4. Clothing management  5. Cognitive training  6. Donning&doffing training  7. Keanu tech training  8. Neuromuscular re-eduation  9. Therapeutic activity  10. Therapeutic exercise     TREATMENT PLAN: Frequency/Duration: Follow patient 3 times per week to address above goals. Rehabilitation Potential For Stated Goals: Excellent     REHAB RECOMMENDATIONS (at time of discharge pending progress):    Placement: It is my opinion, based on this patient's performance to date, that Mr. Juvencio Saavedra may benefit from intensive therapy at an [address] [place] [address] after discharge due to potential to make ongoing and sustainable functional improvement that is of practical value. Niels Coe  Pt functioning far below independent baseline, demonstrating good improvement and participation. Pt would likely benefit greatly and increase independence from inpatient rehab stay. Equipment:    TBD               OCCUPATIONAL PROFILE AND HISTORY:   History of Present Injury/Illness (Reason for Referral):  See H&P  Past Medical History/Comorbidities:   Mr. Joanne Beach  has a past medical history of Acute ischemic stroke ([address] (12/12/2019), Acute pancreatitis (11/19/2014), Cerebrovascular accident (CVA) due to embolism ([address] (12/12/2019), Diabetes ([address] (2002), Diabetes ([address] Diabetes mellitus, and Hypertension. He also has no past medical history of Arthritis, Asthma, Autoimmune disease ([address] CAD (coronary artery disease), Cancer ([address] Chronic kidney disease, COPD, Dementia, Dementia ([address] Heart failure ([address] Ill-defined condition, Infectious disease, Liver disease, Other ill-defined conditions(799.89), Psychiatric disorder, PUD (peptic ulcer disease), Seizures ([address] or Sleep disorder. Mr. Joanne Beach  has a past surgical history that includes hx hernia repair and hx orthopaedic. Social History/Living Environment:   Home Environment: Apartment  # Steps to Enter: 12  Rails to Enter: Yes  [employer] : Bilateral  One/Two Story Residence: One story  Living Alone: No  Support Systems: Spouse/Significant Other/Partner  Patient Expects to be Discharged to[de-identified] Unknown  Current DME Used/Available at Home: None  Tub or Shower Type: Tub/Shower combination  Prior Level of Function/Work/Activity:  Pt lives at home with his wife. Pt is typically independent with ADL/functional mobility. Pt does not drive. Pt was working part-time at [employer]. Personal Factors:          Age:  53 y.o.         Past/Current Experience:  CVA with flaccid L side        Other factors that influence how disability is experienced by the patient:  multiple co-morbidities    Number of Personal Factors/Comorbidities that affect the Plan of Care: Extensive review of physical, cognitive, and psychosocial performance (3+):  HIGH COMPLEXITY   ASSESSMENT OF OCCUPATIONAL PERFORMANCE[de-identified]   Activities of Daily Living:   Basic ADLs (From Assessment) Complex ADLs (From Assessment)   Feeding: Setup  Oral Facial Hygiene/Grooming: Minimum assistance  Bathing: Minimum assistance, Moderate assistance  Upper Body Dressing: Minimum assistance  Lower Body Dressing: Moderate assistance  Toileting: Minimum assistance Instrumental ADL  Meal Preparation: Total assistance  Homemaking: Total assistance  Medication Management: Total assistance  Financial Management: Total assistance   Grooming/Bathing/Dressing Activities of Daily Living   Grooming  Brushing Teeth: Contact guard assistance Cognitive Retraining  Safety/Judgement: Fall prevention;Home safety   Upper Body Bathing  Bathing Assistance: Min A   Position Performed: Seated in chair                  Lower Body Dressing Assistance  Socks:  Total assistance (dependent) Bed/Mat Mobility  Rolling: Supervision  Supine to Sit: Supervision  Sit to Supine: Supervision  Sit to Stand: Contact guard assistance  Stand to Sit: Contact guard assistance  Scooting: Minimum assistance  Cues: Tactile cues provided;Verbal cues provided;Visual cues provided     Most Recent Physical Functioning:   Gross Assessment:  AROM: (limited AROM in the L UE)  PROM: Generally decreased, functional(L UE)  Strength: (grossly 2-/5 to trace movement in L UE)  Coordination: (non-functional in L UE)  Tone: Abnormal(hypotonic in L UE)  Sensation: Intact(reports intact grossly to light touch)               Posture:     Balance:  Sitting: Intact  Sitting - Static: Good (unsupported)  Sitting - Dynamic: Good (unsupported)  Standing: Impaired  Standing - Static: Good  Standing - Dynamic : Fair Bed Mobility:  Rolling: Supervision  Supine to Sit: Supervision  Sit to Supine: Supervision  Scooting: Minimum assistance  Wheelchair Mobility:     Transfers:  Sit to Stand: Contact guard assistance  Stand to Sit: Contact guard assistance            Patient Vitals for the past 6 hrs:   BP BP Patient Position SpO2 Pulse   20 1200 117/82 At rest 98 % 91       Mental Status  Neurologic State: Alert  Orientation Level: Oriented X4  Cognition: Follows commands  Perception: Cues to attend to left side of body, Cues to maintain midline in sitting, Cues to maintain midline in standing  Perseveration: No perseveration noted  Safety/Judgement: Fall prevention, Home safety                          Physical Skills Involved:  1. Range of Motion  2. Balance  3. Strength  4. Activity Tolerance  5. Fine Motor Control  6. Gross Motor Control Cognitive Skills Affected (resulting in the inability to perform in a timely and safe manner):  1. Perception  2. Expression Psychosocial Skills Affected:  1. Habits/Routines  2. Environmental Adaptation  3. Social Interaction  4. Emotional Regulation  5. Self-Awareness  6. Awareness of Others  7. Social Roles   Number of elements that affect the Plan of Care: 5+:  HIGH COMPLEXITY   CLINICAL DECISION MAKIN[address] [place] [id number] AM-PAC 6 Clicks   Daily Activity Inpatient Short Form  How much help from another person does the patient currently need. .. Total A Lot A Little None   1. Putting on and taking off regular lower body clothing? [] 1   [x] 2   [] 3   [] 4   2. Bathing (including washing, rinsing, drying)? [] 1   [] 2   [x] 3   [] 4   3. Toileting, which includes using toilet, bedpan or urinal?   [] 1   [] 2   [x] 3   [] 4   4. Putting on and taking off regular upper body clothing? [] 1   [] 2   [x] 3   [] 4   5. Taking care of personal grooming such as brushing teeth? [] 1   [] 2   [x] 3   [] 4   6. Eating meals? [] 1   [] 2   [x] 3   [] 4   © , Trustees of [address] [place] [address] [address] [address], under license to [employer].  All rights reserved      Score:  Initial: 11 Most Recent: 17 (20)    Interpretation of Tool:  Represents activities that are increasingly more difficult (i.e. Bed mobility, Transfers, Gait). Medical Necessity:     · Patient demonstrates   · good and excellent  ·  rehab potential due to higher previous functional level. Reason for Services/Other Comments:  · Patient continues to require skilled intervention due to   · Decreased independence with ADL/functional transfers that impacts overall quality of life. · .   Use of outcome tool(s) and clinical judgement create a POC that gives a: MODERATE COMPLEXITY         TREATMENT:   (In addition to Assessment/Re-Assessment sessions the following treatments were rendered)     Pre-treatment Symptoms/Complaints:  \"Can we work on my nails. \"  Pain: Initial:   Pain Intensity 1: 0 Post Session: Unchanged     Self Care: (10 minutes): Procedure(s) (per grid) utilized to improve and/or restore self-care/home management as related to grooming. Required minimal visual, verbal and tactile cueing to facilitate activities of daily living skills and compensatory activities. Pt using L UE as functional stabilizer throughout task. Therapeutic Exercise: (15 minutes):  Exercises per grid below to improve mobility, strength, balance and coordination. Required moderate visual, verbal, manual and tactile cues to promote proper body alignment, promote proper body posture and promote proper body mechanics. Progressed repetitions and complexity of movement as indicated.      Date:  4/15/20 Date:  4/16/20 Date:     Activity/Exercise Parameters Parameters Parameters   Shoulder flexion SROM/AAROM 15 reps with L UE supported at the elbow by R UE (not past 90 degrees) 15 reps with L UE supported at the elbow by R UE (not past 90 degrees)    Elbow flexion/extension SROM/AAROM 15 reps  15 reps     Forearm supination/pronation  12 reps additional time 15 reps    Wrist extension  To ~15-20 degrees for 2 sets x 10 reps  15 reps    Finger flexion/extension  AAROM for extension with end range stretch x 10 reps  Educated on SROM for finger extension stretch/finger flexion stretch     Washcloth slides   Forward reach x 15 reps with mod facilitation; shoulder horizontal abd/add x 15 reps                   Neuromuscular Re-education: ( 15 minutes):  Exercise/activities per grid below to improve balance, coordination and posture. Required moderate visual, verbal, manual and tactile cues to promote dynamic balance in standing and promote motor control of left, upper extremity(s).            Date:  4/7/20 Date:  4/14/20  Date:  4/15/20 Date:   4/16/20   Activity/Exercise Parameters Parameters Parameters    Midline orientation sit to stand  Moderate cues and facilitation to decrease rotation at trunk and for midline trunk position Moderate facilitation at the L LE and for decreased rotation at the trunk     Midline sit to squat   Min to mod A w/ hands in his lap      Weightbearing into L side standing at sink  Minimal facilitation at the L UE; ~10 reps       Forward reach with cane 10 reps with facilitation at the elbow/scapula  10 reps with moderate facilitation at the elbow/scapula 15 reps with moderate facilitation at the elbow/scapula into protraction  15 reps with moderate facilitation at the elbow/scapula into protraction   External rotation with cane 10 reps with minimal facilitation  10 reps with minimal facilitation  15 reps with SBA/CGA  15 reps with SBA/CGA   Posture  Upright posture with thoracic extension and B hands place in the lap  Upright sitting/standing with verbal/visual cueing  Pt encouraged for upright posture with moderate cues throughout session  Pt encouraged for upright posture with moderate cues throughout session    Weightbearing into elbow   10 reps with moderate assistance pushing up into midline  2 sets with prolonged weight bearing and reaching to the L of midline for 2 sets x 15 reps  Sitting at the table with pt encouraged for propped elbows and weightbearing through the L with moderate cues    Weightbearing into hand  Mod to maximal assistance with facilitation at the elbow; 2 sets x 10 reps      Elbow flexion  10 reps AAROM; 10 reps holding ball in B hands      Grasp release of washcloth   10 reps with moderate to maximal facilitation for reaching   4-5 reps with additional time    Trunk Rotation    15 reps with minima facilitation  15 reps with minimal facilitation and additional time           Side Scooting    Minimal facilitation and assistance for positioning and weightbearing of L Hand through his L knee and forward forward flexion at the trunk       Braces/Orthotics/Lines/Etc:   · O2 device: Room air  · LUE sling  Treatment/Session Assessment:    · Response to Treatment:  Pt with increased assist needed for bed mobility today. · Interdisciplinary Collaboration:   o Occupational Therapist  o Registered Nurse  · After treatment position/precautions:   o up to wheelchair with mobility team    · Compliance with Program/Exercises: Compliant all of the time, Will assess as treatment progresses. · Recommendations/Intent for next treatment session: \"Next visit will focus on advancements to more challenging activities and reduction in assistance provided\".   Total Treatment Duration:   OT Patient Time In/Time Out  Time In: 1348  Time Out: 1080 [facility]

## 2020-04-17 LAB
GLUCOSE BLD STRIP.AUTO-MCNC: 113 MG/DL (ref 65–100)
GLUCOSE BLD STRIP.AUTO-MCNC: 139 MG/DL (ref 65–100)
GLUCOSE BLD STRIP.AUTO-MCNC: 141 MG/DL (ref 65–100)

## 2020-04-17 PROCEDURE — 74011250637 HC RX REV CODE- 250/637: Performed by: NURSE PRACTITIONER

## 2020-04-17 PROCEDURE — 74011250636 HC RX REV CODE- 250/636: Performed by: INTERNAL MEDICINE

## 2020-04-17 PROCEDURE — 82962 GLUCOSE BLOOD TEST: CPT

## 2020-04-17 PROCEDURE — 74011250637 HC RX REV CODE- 250/637: Performed by: HOSPITALIST

## 2020-04-17 PROCEDURE — 65270000029 HC RM PRIVATE

## 2020-04-17 PROCEDURE — 74011250637 HC RX REV CODE- 250/637: Performed by: INTERNAL MEDICINE

## 2020-04-17 PROCEDURE — 97530 THERAPEUTIC ACTIVITIES: CPT

## 2020-04-17 RX ADMIN — ENOXAPARIN SODIUM 40 MG: 40 INJECTION SUBCUTANEOUS at 14:03

## 2020-04-17 RX ADMIN — METFORMIN HYDROCHLORIDE 500 MG: 500 TABLET ORAL at 17:24

## 2020-04-17 RX ADMIN — ACETAMINOPHEN 650 MG: 325 TABLET, FILM COATED ORAL at 21:51

## 2020-04-17 RX ADMIN — LISINOPRIL 40 MG: 20 TABLET ORAL at 08:14

## 2020-04-17 RX ADMIN — HYDRALAZINE HYDROCHLORIDE 20 MG: 20 INJECTION, SOLUTION INTRAMUSCULAR; INTRAVENOUS at 05:10

## 2020-04-17 RX ADMIN — GUAIFENESIN 100 MG: 100 SOLUTION ORAL at 17:29

## 2020-04-17 RX ADMIN — ATORVASTATIN CALCIUM 80 MG: 80 TABLET, FILM COATED ORAL at 21:51

## 2020-04-17 RX ADMIN — METOPROLOL SUCCINATE 25 MG: 25 TABLET, FILM COATED, EXTENDED RELEASE ORAL at 08:14

## 2020-04-17 RX ADMIN — ACETAMINOPHEN 650 MG: 325 TABLET, FILM COATED ORAL at 14:02

## 2020-04-17 RX ADMIN — ACETAMINOPHEN 650 MG: 325 TABLET, FILM COATED ORAL at 05:12

## 2020-04-17 RX ADMIN — Medication 400 MG: at 08:14

## 2020-04-17 RX ADMIN — DIPHENHYDRAMINE HYDROCHLORIDE 25 MG: 25 CAPSULE ORAL at 17:29

## 2020-04-17 RX ADMIN — ASPIRIN 81 MG 81 MG: 81 TABLET ORAL at 08:14

## 2020-04-17 RX ADMIN — METFORMIN HYDROCHLORIDE 500 MG: 500 TABLET ORAL at 08:14

## 2020-04-17 NOTE — PROGRESS NOTES
Attempted to see pt for OT treatment session this PM. Pt had PT session earlier this PM and recently returned from working with Ambulatory Team. Pt declined working with OT at this time due to increased fatigue. Will attempt at later time and as schedule permits.     Thank you,     KVNG Gonzalez/TEE

## 2020-04-17 NOTE — DIABETES MGMT
Patient admitted with acute embolic stroke. Blood glucose ranged  yesterday with patient receiving Humalog 2 units and Metformin 1000mg. Blood glucose this morning was 113. Reviewed patient current regimen: Metformin 500mg BID and Humalog SSI. Blood glucose levels overall stable on current regimen will continue to follow along.

## 2020-04-17 NOTE — PROGRESS NOTES
Hospitalist Progress Note     Admit Date:  2019  9:07 AM   Name:  Austin Lo   Age:  53 y.o.  :  1975   MRN:  [id number]   PCP:  Elham Montes MD  Treatment Team: Attending Provider: Chichi Henao MD; Care Manager: Taisha Grimaldo [facility]; Utilization Review: Alyssa Muniz RN; Nurse Practitioner: Syed Mccarthy NP; Care Manager: Bora Cárdenas RN; Primary Nurse: Chaya Fabian; Physical Therapist: Eamon Diaz DPT; Occupational Therapist: Mary Fairchild    Subjective:   HPI and or CC:  54 yo AA male with PMH of DM, HTN admitted  for CVA affecting numerous areas of the brain. He was placed on ASA and statin. He has remained alert and oriented x3. Some movement to right side but unable to grasp with right hand. He is currently waiting on placement and this has remained difficult due to waiting on Medicaid. : On RA with saturations mid 90s. Continues to wait on SSI and Medicaid approval.  Alert and oriented x3. Voices no complaints today. Continues to wait for placement. He remains afebrile, no leukocytosis. NIH remains 7. Objective:     Patient Vitals for the past 24 hrs:   Temp Pulse Resp BP SpO2   20 0800 97.9 °F (36.6 °C) 80 17 109/73 95 %   20 0400 98 °F (36.7 °C) 81 18 (!) 145/108 98 %   20 0000 98 °F (36.7 °C) 67 18 114/76 98 %   20 2135 98.4 °F (36.9 °C) 69 18 117/76 97 %   20 1600 97.9 °F (36.6 °C) 72 18 120/81 99 %   20 1200 98 °F (36.7 °C) 91 17 117/82 98 %     Oxygen Therapy  O2 Sat (%): 95 % (20 0800)  Pulse via Oximetry: 75 beats per minute (20 0400)  O2 Device: Room air (20 1500)  No intake or output data in the 24 hours ending 20 1039      REVIEW OF SYSTEMS: Comprehensive ROS performed and negative except as stated in HPI. Physical Examination:  General:       No acute distress    Lungs:          CTA bilaterally.  Resp even and nonlabored  Heart:            S1S2 present without murmurs rubs gallops. RRR. No LE edema  Abdomen:    Soft, non tender, non distended. BS present  Extremities: No cyanosis. Left sided hemiparesis  Neurologic:  moves right hand and raises arm at elbow moderately, unable to squeeze my fingers left hand, mildly slurred speech.  PERRLA, strength 4/5 RUE/RLE. Data Review:  I have reviewed all labs, meds, telemetry events, and studies from the last 24 hours.     Recent Results (from the past 24 hour(s))   GLUCOSE, POC    Collection Time: 04/16/20 10:58 AM   Result Value Ref Range    Glucose (POC) 148 (H) 65 - 100 mg/dL   GLUCOSE, POC    Collection Time: 04/16/20  4:11 PM   Result Value Ref Range    Glucose (POC) 127 (H) 65 - 100 mg/dL   GLUCOSE, POC    Collection Time: 04/16/20  9:46 PM   Result Value Ref Range    Glucose (POC) 87 65 - 100 mg/dL   GLUCOSE, POC    Collection Time: 04/17/20  7:22 AM   Result Value Ref Range    Glucose (POC) 113 (H) 65 - 100 mg/dL        All Micro Results     None          Current Meds:  Current Facility-Administered Medications   Medication Dose Route Frequency    metFORMIN (GLUCOPHAGE) tablet 500 mg  500 mg Oral BID WITH MEALS    insulin lispro (HUMALOG) injection 0-10 Units  0-10 Units SubCUTAneous AC&HS    magnesium oxide (MAG-OX) tablet 400 mg  400 mg Oral DAILY    acetaminophen (TYLENOL) tablet 650 mg  650 mg Oral Q4H PRN    diphenhydrAMINE (BENADRYL) capsule 25 mg  25 mg Oral Q6H PRN    acetaminophen (TYLENOL) tablet 650 mg  650 mg Oral Q8H    lip protectant (BLISTEX) ointment 1 Each  1 Each Topical PRN    metoprolol succinate (TOPROL-XL) XL tablet 25 mg  25 mg Oral DAILY    polyethylene glycol (MIRALAX) packet 17 g  17 g Oral DAILY PRN    guaiFENesin (ROBITUSSIN) 100 mg/5 mL oral liquid 100 mg  100 mg Oral Q4H PRN    hydrALAZINE (APRESOLINE) 20 mg/mL injection 20 mg  20 mg IntraVENous Q4H PRN    atorvastatin (LIPITOR) tablet 80 mg  80 mg Oral QHS    lisinopril (PRINIVIL, ZESTRIL) tablet 40 mg  40 mg Oral DAILY  sodium chloride (NS) flush 5-40 mL  5-40 mL IntraVENous PRN    ondansetron (ZOFRAN) injection 4 mg  4 mg IntraVENous Q4H PRN    aspirin chewable tablet 81 mg  81 mg Oral DAILY    enoxaparin (LOVENOX) injection 40 mg  40 mg SubCUTAneous Q24H    dextrose 40% (GLUTOSE) oral gel 1 Tube  15 g Oral PRN    glucagon (GLUCAGEN) injection 1 mg  1 mg IntraMUSCular PRN    dextrose (D50W) injection syrg 12.5-25 g  25-50 mL IntraVENous PRN       Diet:  DIET NUTRITIONAL SUPPLEMENTS  DIET DIABETIC CONSISTENT CARB    Other Studies (last 24 hours):  No results found. Assessment and Plan:     Hospital Problems as of 4/17/2020 Date Reviewed: 3/3/2017          Codes Class Noted - Resolved POA    Hypomagnesemia ICD-10-CM: E83.42  ICD-9-CM: 275.2  3/7/2020 - Present Unknown        PFO (patent foramen ovale) ICD-10-CM: Q21.1  ICD-9-CM: 745.5  12/17/2019 - Present Yes        Arrhythmia ICD-10-CM: I49.9  ICD-9-CM: 427.9  12/15/2019 - Present Yes        Hyperlipemia ICD-10-CM: E78.5  ICD-9-CM: 272.4  12/15/2019 - Present Yes        * (Principal) Acute embolic stroke ([facility] WILLIE-24-CL: I63.9  ICD-9-CM: 434.11  12/12/2019 - Present Yes        Hypertension (Chronic) ICD-10-CM: I10  ICD-9-CM: 401.9  Unknown - Present Yes        Type 2 diabetes mellitus (HCC) (Chronic) ICD-10-CM: E11.9  ICD-9-CM: 250.00  Unknown - Present Yes              A/P:    Acute embolic stroke  MRI brain showed acute to early subacute infarcts in the left cerebellar hemisphere, in the periventricular deep left frontoparietal white matter and likely additional areas of restricted diffusion in the right paramedian yariel.    +PFO on echo  On ASA, statin  Will need 4 week event monitor on DC, if no arrhythmia then will need PFO closure      Hypomagnesemia  Resolved       Hypertension  Continue metoprolol and ACE inhibitor  Goal BP <130/80     Type 2 diabetes mellitus:    Monitor, BGL controlled        Signed:  Pamela Murrell NP

## 2020-04-17 NOTE — PROGRESS NOTES
Problem: Mobility Impaired (Adult and Pediatric)  Goal: *Acute Goals and Plan of Care  Description  GOALS UPDATED ON RE-ASSESSMENT 4/1/2020 (advancements to goals in bold):  1. Patient will perform bed mobility with supervision and 0 verbal cues within 7 treatment days. 2. Patient will perform transfer bed to chair with SUPERVISION within 7 treatment days. 3. Patient will demonstrate fair+ dynamic balance throughout stance phase on L LE within 7 treatment days. 4. Patient will require STAND BY ASSIST throughout swing phase on (to advance) L LE within 7 treatment days. 5. Patient demonstrate 3+/5 strength in L LE hip flexion, hip abduction, and hip adduction within 7 treatment days. 6. Patient will ambulate 200ft+ with STAND BY ASSIST and least retrictive assistive device within 7 treatment days. 7. Patient will perform wheelchair mobility x150ft with MODIFIED INDEPENDENCE within 7 treatment days. 8. Mr. Yvette Lindsey will be independent with wheelchair locking/unlocking mechanism as well as leg rest manipulation within 7 days to improve safety and independence. 9. Mr. Yvette Lindsey will don/doff LUE sling for protection of L shoulder during transfers/gait within 7 treatment days. PHYSICAL THERAPY: Daily Note and AM 4/17/2020  INPATIENT: PT Visit Days : 6  [id number] [address] / Plan: [address] [place] [address] Avenue / Product Type: Medicaid /       NAME/AGE/GENDER: Dio Pavon is a 53 y.o. male   PRIMARY DIAGNOSIS: Acute ischemic stroke ([address] [I63.9]  Cerebrovascular accident (CVA) due to embolism ([address] [G00.2] Acute embolic stroke ([address] Acute embolic stroke ([address]       ICD-10: Treatment Diagnosis:   · Difficulty in walking, Not elsewhere classified (R26.2)  · Hemiplegia and hemiparesis following cerebral infarction affecting left non-dominant side (M80.542)   Precaution/Allergies:  Patient has no known allergies. ASSESSMENT:     Mr. Yvette Lindsey is a 53year old admitted R MCA CVA.   Patient was supine upon contact and agreeable to PT. Patient performs supine to sit with supervision and additional time. Patient attempted to don sling on LUE on his own power which he did pretty good requiring min assist and cues for technique. Patient transfers to standing with CGA-SBA. Patient then ambulates 200' x 2 with luke walker, and focus on positioning of L foot during strike (heel first) and stance phase on L to improve external rotation. Patient needed cues periodically throughout gait to improve this and have proper gait sequencing. Wheelchair in tow for safety. Gait mechanics decline as patient fatigues requiring increased cues. Seated rest break in between ambulation bouts. Patient then performs wheelchair mobility x 100' with several rest breaks and cues for improved/proper technique. Patient returns to his room where he was left sitting up in wheelchair. Overall good progress towards physical therapy goals. Goals listed above are still appropriate. Will continue efforts as patient is still below functional baseline. This section established at most recent assessment   PROBLEM LIST (Impairments causing functional limitations):  1. Decreased Strength  2. Decreased ADL/Functional Activities  3. Decreased Transfer Abilities  4. Decreased Ambulation Ability/Technique  5. Decreased Balance  6. Increased Pain  7. Decreased Activity Tolerance  8. Increased Fatigue  9. Decreased Flexibility/Joint Mobility   INTERVENTIONS PLANNED: (Benefits and precautions of physical therapy have been discussed with the patient.)  1. Balance Exercise  2. Bed Mobility  3. Family Education  4. Gait Training  5. Home Exercise Program (HEP)  6. Manual Therapy  7. Neuromuscular Re-education/Strengthening  8. Range of Motion (ROM)  9. Therapeutic Activites  10. Therapeutic Exercise/Strengthening  11.  Transfer Training     TREATMENT PLAN: Frequency/Duration: 3 times a week for duration of hospital stay  Rehabilitation Potential For Stated Goals: Good     REHAB RECOMMENDATIONS (at time of discharge pending progress):    Placement: It is my opinion, based on this patient's performance to date, that Mr. Radha Roldan may benefit from intensive therapy at an [address] [place] [address] after discharge due to a probable need for close medical supervision by a rehab physician, a probable need for multiple therapy disciplines and potential to make ongoing and sustainable functional improvement that is of practical value. .  Equipment:    TBD pending progress with therapy. HISTORY:   History of Present Injury/Illness (Reason for Referral):  Per H&P: \"Pt is a 52 y/o smoker with DM, HTN, who presented to ER with L leg and arm weakness, L facial droop, dysarthria. First noted L leg weakness late night 12/11 when he woke up to go to the bathroom. He was normal when he went to bed around 1030. Woke up this morning and had persistent weakness L leg and also now noted in L arm. EMS called. Noted to have slurred speech and L facial droop as well. Code S called in ER around 9am.  MRI with acute infarcts in L cerebellar hemisphere, deep frontoparietal white matter, and R paramedian yariel. Also noted old lacunar infarcts. No large vessel occlusion on CTA, but some stenosis noted. No hx afib, TIA, CVA. No CP, palpitations, SOB. \"  Past Medical History/Comorbidities:   Mr. Radha Roldan  has a past medical history of Acute ischemic stroke ([address] (12/12/2019), Acute pancreatitis (11/19/2014), Cerebrovascular accident (CVA) due to embolism ([address] (12/12/2019), Diabetes ([address] (2002), Diabetes ([address] Diabetes mellitus, and Hypertension.  He also has no past medical history of Arthritis, Asthma, Autoimmune disease ([address] CAD (coronary artery disease), Cancer ([address] Chronic kidney disease, COPD, Dementia, Dementia ([address] Heart failure ([address] Ill-defined condition, Infectious disease, Liver disease, Other ill-defined conditions(799.89), Psychiatric disorder, PUD (peptic ulcer disease), Seizures ([facility] or Sleep disorder. Mr. Osmar Davey  has a past surgical history that includes hx hernia repair and hx orthopaedic. Social History/Living Environment:   Home Environment: Apartment  # Steps to Enter: 12  Rails to Enter: Yes  [employer] : Bilateral  One/Two Story Residence: One story  Living Alone: No  Support Systems: Spouse/Significant Other/Partner  Patient Expects to be Discharged to[de-identified] Unknown  Current DME Used/Available at Home: None  Tub or Shower Type: Tub/Shower combination  Prior Level of Function/Work/Activity:  Independent, lives with wife in 2nd story 1 level apartment. No recent falls. Number of Personal Factors/Comorbidities that affect the Plan of Care: 1-2: MODERATE COMPLEXITY   EXAMINATION:   Most Recent Physical Functioning:   Gross Assessment:                  Posture:     Balance:  Sitting: Intact  Standing: Impaired  Standing - Static: Good  Standing - Dynamic : Fair Bed Mobility:  Supine to Sit: Supervision  Wheelchair Mobility:     Transfers:  Sit to Stand: Contact guard assistance;Stand-by assistance  Stand to Sit: Contact guard assistance;Stand-by assistance  Gait:     Base of Support: Narrowed; Shift to right  Speed/Clotilde: Slow  Step Length: Left shortened  Gait Abnormalities: Hemiplegic  Distance (ft): (200' x 2)  Assistive Device: Walker luke  Ambulation - Level of Assistance: Contact guard assistance;Stand-by assistance  Interventions: Safety awareness training; Tactile cues; Verbal cues      Body Structures Involved:  1. Nerves  2. Voice/Speech  3. Bones  4. Joints  5. Muscles Body Functions Affected:  1. Mental  2. Sensory/Pain  3. Neuromusculoskeletal  4. Movement Related Activities and Participation Affected:  1. General Tasks and Demands  2. Mobility  3. Self Care  4.  Interpersonal Interactions and Relationships   Number of elements that affect the Plan of Care: 4+: HIGH COMPLEXITY   CLINICAL PRESENTATION:   Presentation: Evolving clinical presentation with changing clinical characteristics: MODERATE COMPLEXITY   CLINICAL DECISION MAKIN [facility] Mobility Inpatient Short Form  How much difficulty does the patient currently have. .. Unable A Lot A Little None   1. Turning over in bed (including adjusting bedclothes, sheets and blankets)? [] 1   [] 2   [] 3   [x] 4   2. Sitting down on and standing up from a chair with arms ( e.g., wheelchair, bedside commode, etc.)   [] 1   [] 2   [x] 3   [] 4   3. Moving from lying on back to sitting on the side of the bed? [] 1   [] 2   [] 3   [x] 4   How much help from another person does the patient currently need. .. Total A Lot A Little None   4. Moving to and from a bed to a chair (including a wheelchair)? [] 1   [] 2   [x] 3   [] 4   5. Need to walk in hospital room? [] 1   [] 2   [x] 3   [] 4   6. Climbing 3-5 steps with a railing? [] 1   [x] 2   [] 3   [] 4   © , Trustees of [address] [place] [address], under license to "[employer]". All rights reserved      Score:  Initial: 13 Most Recent: 19 (Date: 4/10/2020)    Interpretation of Tool:  Represents activities that are increasingly more difficult (i.e. Bed mobility, Transfers, Gait). Medical Necessity:     · Patient is expected to demonstrate progress in   · strength, range of motion, balance, coordination, and functional technique  ·  to   · increase independence with all mobility. · .  Reason for Services/Other Comments:  · Patient continues to require skilled intervention due to   · medical complications and mobility deficits which impact his level of function, safety, and independence as indicated above.    · .   Use of outcome tool(s) and clinical judgement create a POC that gives a: Questionable prediction of patient's progress: MODERATE COMPLEXITY        TREATMENT:      Pre-treatment Symptoms/Complaints:  No complaints   Pain: Initial: 0/10 Post Session:  0/10     Therapeutic Activity: (    25 Minutes): Therapeutic activities including bed mobility training, transfer training, ambulation on level ground with cues for gait mechanics, wheelchair mobility,, and patient education  to improve mobility, strength and balance. Required moderate Safety awareness training; Tactile cues; Verbal cues to promote static and dynamic balance in standing and promote coordination of bilateral, lower extremity(s). Date:  4/15/2020 Date:  4/16/2020 Date:     Activity/Exercise Parameters Parameters Parameters   Standing Hip Abduction X 10 B     LAQs X 10 B 2 x 10 B    Seated Marhces  2 x 10B                                      Braces/Orthotics/Lines/Etc:   · Sling to LUE  Treatment/Session Assessment:    · Response to Treatment:  See above  · Interdisciplinary Collaboration:   o Physical Therapy Assistant  o Registered Nurse  o Rehabilitation Attendant  · After treatment position/precautions:   o Up in chair  o Bed/Chair-wheels locked  o Call light within reach  o RN notified   · Compliance with Program/Exercises: Compliant all of the time  · Recommendations/Intent for next treatment session: \"Next visit will focus on advancements to more challenging activities and reduction in assistance provided\".     Total Treatment Duration:  PT Patient Time In/Time Out  Time In: 1430  Time Out: [address] [facility],[address] [facility]

## 2020-04-17 NOTE — PROGRESS NOTES
Problem: Pressure Injury - Risk of  Goal: *Prevention of pressure injury  Description: Document Dewey Scale and appropriate interventions in the flowsheet. Outcome: Progressing Towards Goal  Note: Pressure Injury Interventions:  Sensory Interventions: Assess changes in LOC, Discuss PT/OT consult with provider, Check visual cues for pain, Float heels, Keep linens dry and wrinkle-free, Maintain/enhance activity level, Pressure redistribution bed/mattress (bed type)    Moisture Interventions: Absorbent underpads, Check for incontinence Q2 hours and as needed, Limit adult briefs, Maintain skin hydration (lotion/cream)    Activity Interventions: Increase time out of bed, Pressure redistribution bed/mattress(bed type), PT/OT evaluation    Mobility Interventions: HOB 30 degrees or less, Pressure redistribution bed/mattress (bed type), PT/OT evaluation    Nutrition Interventions: Document food/fluid/supplement intake, Offer support with meals,snacks and hydration    Friction and Shear Interventions: HOB 30 degrees or less, Lift sheet    Problem: Falls - Risk of  Goal: *Absence of Falls  Description: Document Jeanne Fall Risk and appropriate interventions in the flowsheet.   Outcome: Progressing Towards Goal  Note: Fall Risk Interventions:  Mobility Interventions: Bed/chair exit alarm, Communicate number of staff needed for ambulation/transfer, OT consult for ADLs, Patient to call before getting OOB, PT Consult for mobility concerns    Mentation Interventions: Bed/chair exit alarm, Adequate sleep, hydration, pain control, Toileting rounds, Update white board    Medication Interventions: Bed/chair exit alarm, Evaluate medications/consider consulting pharmacy, Patient to call before getting OOB, Teach patient to arise slowly    Elimination Interventions: Bed/chair exit alarm, Call light in reach, Patient to call for help with toileting needs, Stay With Me (per policy), Toileting schedule/hourly rounds    History of Falls Interventions: Bed/chair exit alarm, Consult care management for discharge planning, Door open when patient unattended    Problem: Diabetes Self-Management  Goal: *Disease process and treatment process  Description: Define diabetes and identify own type of diabetes; list 3 options for treating diabetes. Outcome: Progressing Towards Goal  Goal: *Monitoring blood glucose, interpreting and using results  Description: Identify recommended blood glucose targets  and personal targets.   Outcome: Progressing Towards Goal     Problem: General Medical Care Plan  Goal: *Vital signs within specified parameters  Outcome: Progressing Towards Goal  Goal: *Anxiety reduced or absent  Outcome: Progressing Towards Goal  Goal: *Progressive mobility and function (eg: ADL's)  Outcome: Progressing Towards Goal

## 2020-04-17 NOTE — PROGRESS NOTES
Problem: Patient Education: Go to Patient Education Activity  Goal: Patient/Family Education  Outcome: Progressing Towards Goal     Problem: Pressure Injury - Risk of  Goal: *Prevention of pressure injury  Description: Document Dewey Scale and appropriate interventions in the flowsheet. Outcome: Progressing Towards Goal  Note: Pressure Injury Interventions:  Sensory Interventions: Assess changes in LOC, Discuss PT/OT consult with provider, Check visual cues for pain, Float heels, Keep linens dry and wrinkle-free, Maintain/enhance activity level, Pressure redistribution bed/mattress (bed type)    Moisture Interventions: Absorbent underpads    Activity Interventions: Increase time out of bed    Mobility Interventions: HOB 30 degrees or less    Nutrition Interventions: Document food/fluid/supplement intake    Friction and Shear Interventions: HOB 30 degrees or less                Problem: Patient Education: Go to Patient Education Activity  Goal: Patient/Family Education  Outcome: Progressing Towards Goal     Problem: Falls - Risk of  Goal: *Absence of Falls  Description: Document Jeanne Fall Risk and appropriate interventions in the flowsheet. Outcome: Progressing Towards Goal  Note: Fall Risk Interventions:  Mobility Interventions: Bed/chair exit alarm    Mentation Interventions: Bed/chair exit alarm, Adequate sleep, hydration, pain control, Toileting rounds, Update white board    Medication Interventions: Bed/chair exit alarm    Elimination Interventions: Bed/chair exit alarm    History of Falls Interventions: Bed/chair exit alarm         Problem: Patient Education: Go to Patient Education Activity  Goal: Patient/Family Education  Outcome: Progressing Towards Goal     Problem: Diabetes Self-Management  Goal: *Disease process and treatment process  Description: Define diabetes and identify own type of diabetes; list 3 options for treating diabetes.   Outcome: Progressing Towards Goal  Goal: *Incorporating nutritional management into lifestyle  Description: Describe effect of type, amount and timing of food on blood glucose; list 3 methods for planning meals. Outcome: Progressing Towards Goal  Goal: *Incorporating physical activity into lifestyle  Description: State effect of exercise on blood glucose levels. Outcome: Progressing Towards Goal  Goal: *Developing strategies to promote health/change behavior  Description: Define the ABC's of diabetes; identify appropriate screenings, schedule and personal plan for screenings. Outcome: Progressing Towards Goal  Goal: *Using medications safely  Description: State effect of diabetes medications on diabetes; name diabetes medication taking, action and side effects. Outcome: Progressing Towards Goal  Goal: *Monitoring blood glucose, interpreting and using results  Description: Identify recommended blood glucose targets  and personal targets. Outcome: Progressing Towards Goal  Goal: *Prevention, detection, treatment of acute complications  Description: List symptoms of hyper- and hypoglycemia; describe how to treat low blood sugar and actions for lowering  high blood glucose level. Outcome: Progressing Towards Goal  Goal: *Prevention, detection and treatment of chronic complications  Description: Define the natural course of diabetes and describe the relationship of blood glucose levels to long term complications of diabetes.   Outcome: Progressing Towards Goal  Goal: *Developing strategies to address psychosocial issues  Description: Describe feelings about living with diabetes; identify support needed and support network  Outcome: Progressing Towards Goal     Problem: Patient Education: Go to Patient Education Activity  Goal: Patient/Family Education  Outcome: Progressing Towards Goal     Problem: Nutrition Deficit  Goal: *Optimize nutritional status  Outcome: Progressing Towards Goal     Problem: General Medical Care Plan  Goal: *Vital signs within specified parameters  Outcome: Progressing Towards Goal  Goal: *Labs within defined limits  Outcome: Progressing Towards Goal  Goal: *Absence of infection signs and symptoms  Description: Wash hand more often   Outcome: Progressing Towards Goal  Goal: *Optimal pain control at patient's stated goal  Outcome: Progressing Towards Goal  Goal: *Skin integrity maintained  Outcome: Progressing Towards Goal  Goal: *Fluid volume balance  Outcome: Progressing Towards Goal  Goal: *Optimize nutritional status  Outcome: Progressing Towards Goal  Goal: *Anxiety reduced or absent  Outcome: Progressing Towards Goal  Goal: *Progressive mobility and function (eg: ADL's)  Outcome: Progressing Towards Goal     Problem: Patient Education: Go to Patient Education Activity  Goal: Patient/Family Education  Outcome: Progressing Towards Goal     Problem: Patient Education: Go to Patient Education Activity  Goal: Patient/Family Education  Outcome: Progressing Towards Goal     Problem: Patient Education: Go to Patient Education Activity  Goal: Patient/Family Education  Outcome: Progressing Towards Goal     Problem: Patient Education: Go to Patient Education Activity  Goal: Patient/Family Education  Outcome: Progressing Towards Goal     Problem: Ischemic Stroke: Discharge Outcomes  Goal: *Verbalizes anxiety and depression are reduced or absent  Outcome: Progressing Towards Goal  Goal: *Verbalize understanding of risk factor modification(Stroke Metric)  Outcome: Progressing Towards Goal  Goal: *Hemodynamically stable  Outcome: Progressing Towards Goal  Goal: *Absence of aspiration pneumonia  Outcome: Progressing Towards Goal  Goal: *Aware of needed dietary changes  Outcome: Progressing Towards Goal  Goal: *Verbalize understanding of prescribed medications including anti-coagulants, anti-lipid, and/or anti-platelets(Stroke Metric)  Outcome: Progressing Towards Goal  Goal: *Tolerating diet  Outcome: Progressing Towards Goal  Goal: *Aware of follow-up diagnostics related to anticoagulants  Outcome: Progressing Towards Goal  Goal: *Ability to perform ADLs and demonstrates progressive mobility and function  Outcome: Progressing Towards Goal  Goal: *Absence of DVT(Stroke Metric)  Outcome: Progressing Towards Goal  Goal: *Absence of aspiration  Outcome: Progressing Towards Goal  Goal: *Optimal pain control at patient's stated goal  Outcome: Progressing Towards Goal  Goal: *Home safety concerns addressed  Outcome: Progressing Towards Goal  Goal: *Describes available resources and support systems  Outcome: Progressing Towards Goal  Goal: *Verbalizes understanding of activation of EMS(911) for stroke symptoms(Stroke Metric)  Outcome: Progressing Towards Goal  Goal: *Understands and describes signs and symptoms to report to providers(Stroke Metric)  Outcome: Progressing Towards Goal  Goal: *Neurolgocially stable (absence of additional neurological deficits)  Outcome: Progressing Towards Goal  Goal: *Verbalizes importance of follow-up with primary care physician(Stroke Metric)  Outcome: Progressing Towards Goal  Goal: *Smoking cessation discussed,if applicable(Stroke Metric)  Outcome: Progressing Towards Goal  Goal: *Depression screening completed(Stroke Metric)  Outcome: Progressing Towards Goal     Problem: Pain  Goal: *Control of Pain  Outcome: Progressing Towards Goal     Problem: Patient Education: Go to Patient Education Activity  Goal: Patient/Family Education  Outcome: Progressing Towards Goal

## 2020-04-18 LAB
GLUCOSE BLD STRIP.AUTO-MCNC: 108 MG/DL (ref 65–100)
GLUCOSE BLD STRIP.AUTO-MCNC: 113 MG/DL (ref 65–100)
GLUCOSE BLD STRIP.AUTO-MCNC: 115 MG/DL (ref 65–100)
GLUCOSE BLD STRIP.AUTO-MCNC: 119 MG/DL (ref 65–100)

## 2020-04-18 PROCEDURE — 74011250637 HC RX REV CODE- 250/637: Performed by: INTERNAL MEDICINE

## 2020-04-18 PROCEDURE — 82962 GLUCOSE BLOOD TEST: CPT

## 2020-04-18 PROCEDURE — 74011250637 HC RX REV CODE- 250/637: Performed by: HOSPITALIST

## 2020-04-18 PROCEDURE — 97112 NEUROMUSCULAR REEDUCATION: CPT

## 2020-04-18 PROCEDURE — 74011250636 HC RX REV CODE- 250/636: Performed by: INTERNAL MEDICINE

## 2020-04-18 PROCEDURE — 65270000029 HC RM PRIVATE

## 2020-04-18 PROCEDURE — 74011250637 HC RX REV CODE- 250/637: Performed by: NURSE PRACTITIONER

## 2020-04-18 RX ADMIN — ACETAMINOPHEN 650 MG: 325 TABLET, FILM COATED ORAL at 06:17

## 2020-04-18 RX ADMIN — ENOXAPARIN SODIUM 40 MG: 40 INJECTION SUBCUTANEOUS at 16:09

## 2020-04-18 RX ADMIN — METFORMIN HYDROCHLORIDE 500 MG: 500 TABLET ORAL at 08:24

## 2020-04-18 RX ADMIN — Medication 400 MG: at 08:24

## 2020-04-18 RX ADMIN — ACETAMINOPHEN 650 MG: 325 TABLET, FILM COATED ORAL at 16:09

## 2020-04-18 RX ADMIN — GUAIFENESIN 100 MG: 100 SOLUTION ORAL at 21:03

## 2020-04-18 RX ADMIN — ASPIRIN 81 MG 81 MG: 81 TABLET ORAL at 08:24

## 2020-04-18 RX ADMIN — ACETAMINOPHEN 650 MG: 325 TABLET, FILM COATED ORAL at 21:03

## 2020-04-18 RX ADMIN — ATORVASTATIN CALCIUM 80 MG: 80 TABLET, FILM COATED ORAL at 21:03

## 2020-04-18 RX ADMIN — METFORMIN HYDROCHLORIDE 500 MG: 500 TABLET ORAL at 16:09

## 2020-04-18 RX ADMIN — LISINOPRIL 40 MG: 20 TABLET ORAL at 08:24

## 2020-04-18 RX ADMIN — METOPROLOL SUCCINATE 25 MG: 25 TABLET, FILM COATED, EXTENDED RELEASE ORAL at 08:24

## 2020-04-18 RX ADMIN — DIPHENHYDRAMINE HYDROCHLORIDE 25 MG: 25 CAPSULE ORAL at 21:04

## 2020-04-18 NOTE — PROGRESS NOTES
Hospitalist Progress Note     Admit Date:  2019  9:07 AM   Name:  Allie Winters   Age:  53 y.o.  :  1975   MRN:  [id number]   PCP:  Etheleen Aase, MD  Treatment Team: Attending Provider: Nola Diaz MD; Care Manager: Nesha Bernal [facility]; Utilization Review: Roxy Lovell RN; Nurse Practitioner: Laureen Romero NP; Care Manager: Charma Lundborg, RN; Charge Nurse: Efren De La Paz RN; Primary Nurse: Yanick Foster RN; Occupational Therapist: Mary Anton, OTAIDAN, OTR/L    Subjective:   HPI and or CC:  52 yo AA male with PMH of DM, HTN admitted  for CVA affecting numerous areas of the brain. He was placed on ASA and statin. He has remained alert and oriented x3. Some movement to right side but unable to grasp with right hand. He is currently waiting on placement and this has remained difficult due to waiting on Medicaid. : On RA with saturations mid 90s. Continues to wait on SSI and Medicaid approval.  Alert and oriented x3. Voices no complaints today. Continues to wait for placement. He remains afebrile, no leukocytosis. NIH remains 7. Objective:     Patient Vitals for the past 24 hrs:   Temp Pulse Resp BP SpO2   20 0800 98 °F (36.7 °C) 70 18 157/90 100 %   20 0403 98.2 °F (36.8 °C) 78 16 124/76 98 %   20 2338 98 °F (36.7 °C) 75 14 122/75 98 %   20 1856 98.3 °F (36.8 °C) 84 14 117/85 98 %   20 1600 98.1 °F (36.7 °C) 97 17 111/67 97 %   20 1200 98.2 °F (36.8 °C) 100 18 120/83 97 %     Oxygen Therapy  O2 Sat (%): 100 % (20 0800)  Pulse via Oximetry: 75 beats per minute (20 0400)  O2 Device: Room air (20 1500)  No intake or output data in the 24 hours ending 20 0923      REVIEW OF SYSTEMS: Comprehensive ROS performed and negative except as stated in HPI. Physical Examination:  General:       No acute distress    Lungs:          CTA bilaterally.  Resp even and nonlabored  Heart:            S1S2 present without murmurs rubs gallops. RRR. No LE edema  Abdomen:    Soft, non tender, non distended. BS present  Extremities: No cyanosis. Left sided hemiparesis  Neurologic:  moves right hand and raises arm at elbow moderately, unable to squeeze my fingers left hand, mildly slurred speech.  PERRLA, strength 4/5 RUE/RLE. Data Review:  I have reviewed all labs, meds, telemetry events, and studies from the last 24 hours.     Recent Results (from the past 24 hour(s))   GLUCOSE, POC    Collection Time: 04/17/20 11:23 AM   Result Value Ref Range    Glucose (POC) 141 (H) 65 - 100 mg/dL   GLUCOSE, POC    Collection Time: 04/17/20  4:07 PM   Result Value Ref Range    Glucose (POC) 139 (H) 65 - 100 mg/dL   GLUCOSE, POC    Collection Time: 04/18/20  7:27 AM   Result Value Ref Range    Glucose (POC) 108 (H) 65 - 100 mg/dL        All Micro Results     None          Current Meds:  Current Facility-Administered Medications   Medication Dose Route Frequency    metFORMIN (GLUCOPHAGE) tablet 500 mg  500 mg Oral BID WITH MEALS    insulin lispro (HUMALOG) injection 0-10 Units  0-10 Units SubCUTAneous AC&HS    magnesium oxide (MAG-OX) tablet 400 mg  400 mg Oral DAILY    acetaminophen (TYLENOL) tablet 650 mg  650 mg Oral Q4H PRN    diphenhydrAMINE (BENADRYL) capsule 25 mg  25 mg Oral Q6H PRN    acetaminophen (TYLENOL) tablet 650 mg  650 mg Oral Q8H    lip protectant (BLISTEX) ointment 1 Each  1 Each Topical PRN    metoprolol succinate (TOPROL-XL) XL tablet 25 mg  25 mg Oral DAILY    polyethylene glycol (MIRALAX) packet 17 g  17 g Oral DAILY PRN    guaiFENesin (ROBITUSSIN) 100 mg/5 mL oral liquid 100 mg  100 mg Oral Q4H PRN    hydrALAZINE (APRESOLINE) 20 mg/mL injection 20 mg  20 mg IntraVENous Q4H PRN    atorvastatin (LIPITOR) tablet 80 mg  80 mg Oral QHS    lisinopril (PRINIVIL, ZESTRIL) tablet 40 mg  40 mg Oral DAILY    sodium chloride (NS) flush 5-40 mL  5-40 mL IntraVENous PRN    ondansetron (ZOFRAN) injection 4 mg  4 mg IntraVENous Q4H PRN    aspirin chewable tablet 81 mg  81 mg Oral DAILY    enoxaparin (LOVENOX) injection 40 mg  40 mg SubCUTAneous Q24H    dextrose 40% (GLUTOSE) oral gel 1 Tube  15 g Oral PRN    glucagon (GLUCAGEN) injection 1 mg  1 mg IntraMUSCular PRN    dextrose (D50W) injection syrg 12.5-25 g  25-50 mL IntraVENous PRN       Diet:  DIET NUTRITIONAL SUPPLEMENTS  DIET DIABETIC CONSISTENT CARB    Other Studies (last 24 hours):  No results found. Assessment and Plan:     Hospital Problems as of 4/18/2020 Date Reviewed: 3/3/2017          Codes Class Noted - Resolved POA    Hypomagnesemia ICD-10-CM: E83.42  ICD-9-CM: 275.2  3/7/2020 - Present Unknown        PFO (patent foramen ovale) ICD-10-CM: Q21.1  ICD-9-CM: 745.5  12/17/2019 - Present Yes        Arrhythmia ICD-10-CM: I49.9  ICD-9-CM: 427.9  12/15/2019 - Present Yes        Hyperlipemia ICD-10-CM: E78.5  ICD-9-CM: 272.4  12/15/2019 - Present Yes        * (Principal) Acute embolic stroke ([facility] VALENCIA-31-TS: I63.9  ICD-9-CM: 434.11  12/12/2019 - Present Yes        Hypertension (Chronic) ICD-10-CM: I10  ICD-9-CM: 401.9  Unknown - Present Yes        Type 2 diabetes mellitus (HCC) (Chronic) ICD-10-CM: E11.9  ICD-9-CM: 250.00  Unknown - Present Yes              A/P:    Acute embolic stroke  MRI brain showed acute to early subacute infarcts in the left cerebellar hemisphere, in the periventricular deep left frontoparietal white matter and likely additional areas of restricted diffusion in the right paramedian yariel.    +PFO on echo  On ASA, statin  Will need 4 week event monitor on DC, if no arrhythmia then will need PFO closure      Hypomagnesemia  Resolved       Hypertension  Continue metoprolol and ACE inhibitor  Goal BP <130/80     Type 2 diabetes mellitus:    Monitor, BGL controlled        Signed:  Erich Menezes NP

## 2020-04-18 NOTE — PROGRESS NOTES
Problem: Self Care Deficits Care Plan (Adult)  Goal: *Acute Goals and Plan of Care (Insert Text)  Description  Goals per Re-evaluation on 3/18/2020:   1. Patient will demonstrate appropriate safety awareness and protection of L UE during bed mobility and functional transfers with minimal cues. (Progressing, still needs cues, 4/14/20)  2. Patient will complete total body bathing and dressing with Min A and adaptive equipment as needed. (Progressing 4/14/20  3. Patient will complete weightbearing into the L UE with ADL tasks with minimal assistance to improve ability to use as a functional assist during ADL tasks. (Progressing 4/14/20)4. Patient will demonstrate L UE SROM HEP within 7 days. (Progressing, 3/18/2020)  5. Pt will complete bed mobility with Mod I and minimal verbal cueing (Progressing, Supervision, 4/14/20). 6. Pt will complete dynamic sitting balance for ADLs at mod I with good balance (Progressing, 4/14/20  7. Pt will complete functional transfers with Supervision with equipment as needed. (Progressing, SBA to CGA 4/14/20)  8. Pt will complete grooming tasks in standing at sink level x5 mins with good balance and equipment as needed MET  9. Pt will complete grooming standing at sink level with SBA and use of adaptive equipment as needed. MET  10. Pt will don/doff UE sling with SBA and use of minimal verbal and visual cueing for correct application (Progressing, Min A 4/14/20. Goals per Re-evaluation on 3/27/2020:   1. Patient will demonstrate appropriate safety awareness and protection of L UE during bed mobility and functional transfers with no verbal cues. CONTINUE   2. Patient will complete lower body bathing and dressing with Min A and adaptive equipment as needed. CONTINUE  3. Patient will complete upper body bathing and dressing with SBA and adaptive equipment as needed.  CONTINUE  4. Patient will complete weightbearing into the L UE with ADL tasks with minimal assistance to improve ability to use as a functional assist during ADL tasks. CONTINUE  5. Patient will demonstrate L UE SROM HEP within 7 days. CONTINUE  6. Pt will complete bed mobility with Mod I and no verbal cueing. CONTINUE  7. Pt will complete functional transfers with Supervision with equipment as needed. CONTINUE  8. Pt will don/doff UE sling with Mod I and no verbal cueing. CONTINUE  9. Pt will complete toileting with SBA for toilet transfer and gown management. CONTINUE  10. Patient will participate in using L UE as a functional assist for ADL for 15 minutes with minimal facilitation. NEW GOAL 4/14/20  11. Patient will complete sit to stand at midline with minimal assistance and cueing. NEW GOAL 4/14/20  12. Patient will complete therapeutic exercises with L UE with minimal tactile cues for facilitation of the LUE. NeW GOAL 4/14/20    Outcome: Progressing Towards Goal     OCCUPATIONAL THERAPY: Daily Note and PM    4/18/2020  INPATIENT: OT Visit Days: 4  Payor: [id number] / Plan: [place] / Product Type: Medicaid /      NAME/AGE/GENDER: Yo Lind is a 53 y.o. male   PRIMARY DIAGNOSIS:  Acute ischemic stroke ([address] [I63.9]  Cerebrovascular accident (CVA) due to embolism ([address] [U51.4] Acute embolic stroke ([address] Acute embolic stroke ([facility]       ICD-10: Treatment Diagnosis:    · Generalized Muscle Weakness (M62.81)  · Other lack of cordination (R27.8)  · Hemiplegia and hemiparesis following cerebral infarction affecting   · left non-dominant side (I69.354)  · Abnormal posture (R29.3)   Precautions/Allergies:    NO PULLING ON LUE   LUE in sling with shoulder joint approximated and supported, needs taping   Patient has no known allergies. ASSESSMENT:     Mr. Luc Gómez presents to the hospital with L sided weakness and acute ischemic CVA. MRI revealed acute to subacute L cerebellar and R paramedian yariel infarcts. 4/18/2020: Pt was supine in the bed. Pt agreeable to working with therapy.  Pt. Demonstrated stiffness throughout the hand particularly the extrinsic finger flexors/extensors. Noted sacral sitting even edge of bed, but able to facilitate trunk extension/anterior pelvic tilt. He displayed 2-/5 finger extension, 2/5 IF/SF/thumb abduction, 3-/5 pronation/supination, 2-/5 shoulder extension. He needs to gently range the small joints in his hand to maintain PROM and work on finger/thumb extension/wrist as needed for functional prehension. Continue per plan of care. This section established at most recent assessment   PROBLEM LIST (Impairments causing functional limitations):  1. Decreased Strength  2. Decreased ADL/Functional Activities  3. Decreased Transfer Abilities  4. Decreased Ambulation Ability/Technique  5. Decreased Balance  6. Decreased Activity Tolerance  7. Increased Fatigue  8. Decreased Flexibility/Joint Mobility  9. Decreased Knowledge of Precautions  10. Decreased [place] with Home Exercise Program   INTERVENTIONS PLANNED: (Benefits and precautions of occupational therapy have been discussed with the patient.)  1. Activities of daily living training  2. Adaptive equipment training  3. Balance training  4. Clothing management  5. Cognitive training  6. Donning&doffing training  7. Keanu tech training  8. Neuromuscular re-eduation  9. Therapeutic activity  10. Therapeutic exercise     TREATMENT PLAN: Frequency/Duration: Follow patient 3 times per week to address above goals. Rehabilitation Potential For Stated Goals: Excellent     REHAB RECOMMENDATIONS (at time of discharge pending progress):    Placement: It is my opinion, based on this patient's performance to date, that Mr. Teresa Christine may benefit from intensive therapy at an [address] [place] [address] after discharge due to potential to make ongoing and sustainable functional improvement that is of practical value. Arlene Cordova Pt functioning far below independent baseline, demonstrating good improvement and participation.  Pt would likely benefit greatly and increase independence from inpatient rehab stay. Equipment:    TBD               OCCUPATIONAL PROFILE AND HISTORY:   History of Present Injury/Illness (Reason for Referral):  See H&P  Past Medical History/Comorbidities:   Mr. Yvette Lindsey  has a past medical history of Acute ischemic stroke ([address] (12/12/2019), Acute pancreatitis (11/19/2014), Cerebrovascular accident (CVA) due to embolism ([address] (12/12/2019), Diabetes ([address] (2002), Diabetes ([address] Diabetes mellitus, and Hypertension. He also has no past medical history of Arthritis, Asthma, Autoimmune disease ([address] CAD (coronary artery disease), Cancer ([address] Chronic kidney disease, COPD, Dementia, Dementia ([address] Heart failure ([address] Ill-defined condition, Infectious disease, Liver disease, Other ill-defined conditions(799.89), Psychiatric disorder, PUD (peptic ulcer disease), Seizures ([address] or Sleep disorder. Mr. Yvette Lindsey  has a past surgical history that includes hx hernia repair and hx orthopaedic. Social History/Living Environment:   Home Environment: Apartment  # Steps to Enter: 12  Rails to Enter: Yes  [employer] : Bilateral  One/Two Story Residence: One story  Living Alone: No  Support Systems: Spouse/Significant Other/Partner  Patient Expects to be Discharged to[de-identified] Unknown  Current DME Used/Available at Home: None  Tub or Shower Type: Tub/Shower combination  Prior Level of Function/Work/Activity:  Pt lives at home with his wife. Pt is typically independent with ADL/functional mobility. Pt does not drive. Pt was working part-time at [employer]. Personal Factors:          Age:  53 y.o.         Past/Current Experience:  CVA with flaccid L side        Other factors that influence how disability is experienced by the patient:  multiple co-morbidities    Number of Personal Factors/Comorbidities that affect the Plan of Care: Extensive review of physical, cognitive, and psychosocial performance (3+):  HIGH COMPLEXITY   ASSESSMENT OF OCCUPATIONAL PERFORMANCE[de-identified]   Activities of Daily Living:   Basic ADLs (From Assessment) Complex ADLs (From Assessment)   Feeding: Setup  Oral Facial Hygiene/Grooming: Minimum assistance  Bathing: Minimum assistance, Moderate assistance  Upper Body Dressing: Minimum assistance  Lower Body Dressing: Moderate assistance  Toileting: Minimum assistance Instrumental ADL  Meal Preparation: Total assistance  Homemaking: Total assistance  Medication Management: Total assistance  Financial Management: Total assistance   Grooming/Bathing/Dressing Activities of Daily Living         Upper Body Bathing  Bathing Assistance: Min A   Position Performed: Seated in chair                  Lower Body Dressing Assistance  Socks:  Total assistance (dependent) Bed/Mat Mobility  Supine to Sit: Minimum assistance  Sit to Supine: Minimum assistance  Sit to Stand: Contact guard assistance  Stand to Sit: Contact guard assistance  Scooting: Contact guard assistance     Most Recent Physical Functioning:   Gross Assessment:                  Posture:     Balance:  Sitting: Impaired  Sitting - Static: Good (unsupported)  Sitting - Dynamic: Fair (occasional)  Standing: Impaired  Standing - Static: Good  Standing - Dynamic : Fair Bed Mobility:  Supine to Sit: Minimum assistance  Sit to Supine: Minimum assistance  Scooting: Contact guard assistance  Wheelchair Mobility:     Transfers:  Sit to Stand: Contact guard assistance  Stand to Sit: Contact guard assistance            Patient Vitals for the past 6 hrs:   BP BP Patient Position SpO2 Pulse   04/18/20 0800 157/90 At rest 100 % 70   04/18/20 1200 136/82 At rest 96 % 82       Mental Status  Neurologic State: Alert  Orientation Level: Oriented X4  Cognition: Follows commands  Perception: Cues to attend to left side of body, Cues to maintain midline in sitting, Cues to maintain midline in standing  Perseveration: No perseveration noted  Safety/Judgement: Fall prevention, Home safety Physical Skills Involved:  1. Range of Motion  2. Balance  3. Strength  4. Activity Tolerance  5. Fine Motor Control  6. Gross Motor Control Cognitive Skills Affected (resulting in the inability to perform in a timely and safe manner):  1. Perception  2. Expression Psychosocial Skills Affected:  1. Habits/Routines  2. Environmental Adaptation  3. Social Interaction  4. Emotional Regulation  5. Self-Awareness  6. Awareness of Others  7. Social Roles   Number of elements that affect the Plan of Care: 5+:  HIGH COMPLEXITY   CLINICAL DECISION MAKIN[address] [place] [address] AM-PAC 6 Clicks   Daily Activity Inpatient Short Form  How much help from another person does the patient currently need. .. Total A Lot A Little None   1. Putting on and taking off regular lower body clothing? [] 1   [x] 2   [] 3   [] 4   2. Bathing (including washing, rinsing, drying)? [] 1   [] 2   [x] 3   [] 4   3. Toileting, which includes using toilet, bedpan or urinal?   [] 1   [] 2   [x] 3   [] 4   4. Putting on and taking off regular upper body clothing? [] 1   [] 2   [x] 3   [] 4   5. Taking care of personal grooming such as brushing teeth? [] 1   [] 2   [x] 3   [] 4   6. Eating meals? [] 1   [] 2   [x] 3   [] 4   © , Trustees of [address] [place] [address], under license to [employer]. All rights reserved      Score:  Initial: 11 Most Recent: 17 (20)    Interpretation of Tool:  Represents activities that are increasingly more difficult (i.e. Bed mobility, Transfers, Gait). Medical Necessity:     · Patient demonstrates   · good and excellent  ·  rehab potential due to higher previous functional level. Reason for Services/Other Comments:  · Patient continues to require skilled intervention due to   · Decreased independence with ADL/functional transfers that impacts overall quality of life.    · .   Use of outcome tool(s) and clinical judgement create a POC that gives a: MODERATE COMPLEXITY         TREATMENT:   (In addition to Assessment/Re-Assessment sessions the following treatments were rendered)     Pre-treatment Symptoms/Complaints:  \"Can we work on my nails. \"  Pain: Initial:   Pain Intensity 1: 0 Post Session: Unchanged     Self Care: (10 minutes): Procedure(s) (per grid) utilized to improve and/or restore self-care/home management as related to grooming. Required minimal visual, verbal and tactile cueing to facilitate activities of daily living skills and compensatory activities. Pt using L UE as functional stabilizer throughout task. Therapeutic Exercise: (0 minutes):  Exercises per grid below to improve mobility, strength, balance and coordination. Required moderate visual, verbal, manual and tactile cues to promote proper body alignment, promote proper body posture and promote proper body mechanics. Progressed repetitions and complexity of movement as indicated. Date:  4/15/20 Date:  4/16/20 Date:     Activity/Exercise Parameters Parameters Parameters   Shoulder flexion SROM/AAROM 15 reps with L UE supported at the elbow by R UE (not past 90 degrees) 15 reps with L UE supported at the elbow by R UE (not past 90 degrees)    Elbow flexion/extension SROM/AAROM 15 reps  15 reps     Forearm supination/pronation  12 reps additional time 15 reps    Wrist extension  To ~15-20 degrees for 2 sets x 10 reps  15 reps    Finger flexion/extension  AAROM for extension with end range stretch x 10 reps  Educated on SROM for finger extension stretch/finger flexion stretch     Washcloth slides   Forward reach x 15 reps with mod facilitation; shoulder horizontal abd/add x 15 reps                   Neuromuscular Re-education: (  29 minutes):  Exercise/activities per grid below to improve balance, coordination and posture. Required moderate visual, verbal, manual and tactile cues to promote dynamic balance in standing and promote motor control of left, upper extremity(s). LUE Re-Education  Muscle Facilitation: 1.  Finger extension. 2. individual finger extension. 3. Individual finger/thumb abduction IF, SF and thumb. 4. shoulder extension. 5. flexion + elbow extension pushing against chair. 6. supination/pronation. 7. Triceps facilitation with hand in weighbearing position. Muscle Inhibition: 1. Metarcarpal stretches. 2. Thumb webspace stretch. 3. long finger flexor stretch. 4. Long finger extensor stretch. Trunk Re-Education  Muscle Facilitation: trunk extension seated edge of bed. R lateral pelvic tilt for assymetrical scooting forward.     Date:  4/7/20 Date:  4/14/20  Date:  4/15/20 Date:   4/16/20   Activity/Exercise Parameters Parameters Parameters    Midline orientation sit to stand  Moderate cues and facilitation to decrease rotation at trunk and for midline trunk position Moderate facilitation at the L LE and for decreased rotation at the trunk     Midline sit to squat   Min to mod A w/ hands in his lap      Weightbearing into L side standing at sink  Minimal facilitation at the L UE; ~10 reps       Forward reach with cane 10 reps with facilitation at the elbow/scapula  10 reps with moderate facilitation at the elbow/scapula 15 reps with moderate facilitation at the elbow/scapula into protraction  15 reps with moderate facilitation at the elbow/scapula into protraction   External rotation with cane 10 reps with minimal facilitation  10 reps with minimal facilitation  15 reps with SBA/CGA  15 reps with SBA/CGA   Posture  Upright posture with thoracic extension and B hands place in the lap  Upright sitting/standing with verbal/visual cueing  Pt encouraged for upright posture with moderate cues throughout session  Pt encouraged for upright posture with moderate cues throughout session    Weightbearing into elbow   10 reps with moderate assistance pushing up into midline  2 sets with prolonged weight bearing and reaching to the L of midline for 2 sets x 15 reps  Sitting at the table with pt encouraged for propped elbows and weightbearing through the L with moderate cues    Weightbearing into hand  Mod to maximal assistance with facilitation at the elbow; 2 sets x 10 reps      Elbow flexion  10 reps AAROM; 10 reps holding ball in B hands      Grasp release of washcloth   10 reps with moderate to maximal facilitation for reaching   4-5 reps with additional time    Trunk Rotation    15 reps with minima facilitation  15 reps with minimal facilitation and additional time           Side Scooting    Minimal facilitation and assistance for positioning and weightbearing of L Hand through his L knee and forward forward flexion at the trunk       Braces/Orthotics/Lines/Etc:   · O2 device: Room air  Treatment/Session Assessment:    Response to Treatment:  Tolerated well without complications  · Interdisciplinary Collaboration:   o Occupational Therapist  o Registered Nurse  · After treatment position/precautions:   o up to wheelchair with mobility team    · Compliance with Program/Exercises: Compliant all of the time, Will assess as treatment progresses. · Recommendations/Intent for next treatment session: \"Next visit will focus on advancements to more challenging activities and reduction in assistance provided\".   Total Treatment Duration:   OT Patient Time In/Time Out  Time In: 1159  Time Out: [address] SOTERO Velazquez, OTR/L

## 2020-04-18 NOTE — PROGRESS NOTES
Brief hello to patient  He was eating lunch  Doing good    Heber Castrejon, staff [address], [address] [facility]  /   [email or web address]

## 2020-04-19 LAB
GLUCOSE BLD STRIP.AUTO-MCNC: 111 MG/DL (ref 65–100)
GLUCOSE BLD STRIP.AUTO-MCNC: 111 MG/DL (ref 65–100)
GLUCOSE BLD STRIP.AUTO-MCNC: 119 MG/DL (ref 65–100)
GLUCOSE BLD STRIP.AUTO-MCNC: 177 MG/DL (ref 65–100)

## 2020-04-19 PROCEDURE — 65270000029 HC RM PRIVATE

## 2020-04-19 PROCEDURE — 74011250637 HC RX REV CODE- 250/637: Performed by: INTERNAL MEDICINE

## 2020-04-19 PROCEDURE — 74011636637 HC RX REV CODE- 636/637: Performed by: NURSE PRACTITIONER

## 2020-04-19 PROCEDURE — 74011250637 HC RX REV CODE- 250/637: Performed by: NURSE PRACTITIONER

## 2020-04-19 PROCEDURE — 74011250637 HC RX REV CODE- 250/637: Performed by: HOSPITALIST

## 2020-04-19 PROCEDURE — 97530 THERAPEUTIC ACTIVITIES: CPT

## 2020-04-19 PROCEDURE — 82962 GLUCOSE BLOOD TEST: CPT

## 2020-04-19 PROCEDURE — 74011250636 HC RX REV CODE- 250/636: Performed by: INTERNAL MEDICINE

## 2020-04-19 RX ADMIN — DIPHENHYDRAMINE HYDROCHLORIDE 25 MG: 25 CAPSULE ORAL at 17:10

## 2020-04-19 RX ADMIN — ACETAMINOPHEN 650 MG: 325 TABLET, FILM COATED ORAL at 05:28

## 2020-04-19 RX ADMIN — ENOXAPARIN SODIUM 40 MG: 40 INJECTION SUBCUTANEOUS at 14:46

## 2020-04-19 RX ADMIN — ACETAMINOPHEN 650 MG: 325 TABLET, FILM COATED ORAL at 14:46

## 2020-04-19 RX ADMIN — INSULIN LISPRO 2 UNITS: 100 INJECTION, SOLUTION INTRAVENOUS; SUBCUTANEOUS at 17:09

## 2020-04-19 RX ADMIN — LISINOPRIL 40 MG: 20 TABLET ORAL at 09:14

## 2020-04-19 RX ADMIN — ACETAMINOPHEN 650 MG: 325 TABLET, FILM COATED ORAL at 22:00

## 2020-04-19 RX ADMIN — GUAIFENESIN 100 MG: 100 SOLUTION ORAL at 17:10

## 2020-04-19 RX ADMIN — Medication 400 MG: at 09:14

## 2020-04-19 RX ADMIN — ASPIRIN 81 MG 81 MG: 81 TABLET ORAL at 09:14

## 2020-04-19 RX ADMIN — ATORVASTATIN CALCIUM 80 MG: 80 TABLET, FILM COATED ORAL at 22:00

## 2020-04-19 RX ADMIN — METOPROLOL SUCCINATE 25 MG: 25 TABLET, FILM COATED, EXTENDED RELEASE ORAL at 09:14

## 2020-04-19 RX ADMIN — METFORMIN HYDROCHLORIDE 500 MG: 500 TABLET ORAL at 17:09

## 2020-04-19 RX ADMIN — METFORMIN HYDROCHLORIDE 500 MG: 500 TABLET ORAL at 09:14

## 2020-04-19 NOTE — PROGRESS NOTES
Problem: Patient Education: Go to Patient Education Activity  Goal: Patient/Family Education  Outcome: Progressing Towards Goal     Problem: Pressure Injury - Risk of  Goal: *Prevention of pressure injury  Description: Document Dewey Scale and appropriate interventions in the flowsheet. Outcome: Progressing Towards Goal  Note: Pressure Injury Interventions:  Sensory Interventions: Assess changes in LOC    Moisture Interventions: Absorbent underpads    Activity Interventions: Pressure redistribution bed/mattress(bed type), Increase time out of bed    Mobility Interventions: HOB 30 degrees or less, Pressure redistribution bed/mattress (bed type)    Nutrition Interventions: Offer support with meals,snacks and hydration    Friction and Shear Interventions: HOB 30 degrees or less                Problem: Patient Education: Go to Patient Education Activity  Goal: Patient/Family Education  Outcome: Progressing Towards Goal     Problem: Falls - Risk of  Goal: *Absence of Falls  Description: Document Jeanne Fall Risk and appropriate interventions in the flowsheet. Outcome: Progressing Towards Goal  Note: Fall Risk Interventions:  Mobility Interventions: Bed/chair exit alarm    Mentation Interventions: Bed/chair exit alarm    Medication Interventions: Bed/chair exit alarm    Elimination Interventions: Bed/chair exit alarm, Call light in reach    History of Falls Interventions: Bed/chair exit alarm         Problem: Patient Education: Go to Patient Education Activity  Goal: Patient/Family Education  Outcome: Progressing Towards Goal     Problem: Diabetes Self-Management  Goal: *Disease process and treatment process  Description: Define diabetes and identify own type of diabetes; list 3 options for treating diabetes.   Outcome: Progressing Towards Goal  Goal: *Incorporating nutritional management into lifestyle  Description: Describe effect of type, amount and timing of food on blood glucose; list 3 methods for planning meals. Outcome: Progressing Towards Goal  Goal: *Incorporating physical activity into lifestyle  Description: State effect of exercise on blood glucose levels. Outcome: Progressing Towards Goal  Goal: *Developing strategies to promote health/change behavior  Description: Define the ABC's of diabetes; identify appropriate screenings, schedule and personal plan for screenings. Outcome: Progressing Towards Goal  Goal: *Using medications safely  Description: State effect of diabetes medications on diabetes; name diabetes medication taking, action and side effects. Outcome: Progressing Towards Goal  Goal: *Monitoring blood glucose, interpreting and using results  Description: Identify recommended blood glucose targets  and personal targets. Outcome: Progressing Towards Goal  Goal: *Prevention, detection, treatment of acute complications  Description: List symptoms of hyper- and hypoglycemia; describe how to treat low blood sugar and actions for lowering  high blood glucose level. Outcome: Progressing Towards Goal  Goal: *Prevention, detection and treatment of chronic complications  Description: Define the natural course of diabetes and describe the relationship of blood glucose levels to long term complications of diabetes.   Outcome: Progressing Towards Goal  Goal: *Developing strategies to address psychosocial issues  Description: Describe feelings about living with diabetes; identify support needed and support network  Outcome: Progressing Towards Goal     Problem: Patient Education: Go to Patient Education Activity  Goal: Patient/Family Education  Outcome: Progressing Towards Goal     Problem: Patient Education: Go to Patient Education Activity  Goal: Patient/Family Education  Outcome: Progressing Towards Goal     Problem: Nutrition Deficit  Goal: *Optimize nutritional status  Outcome: Progressing Towards Goal     Problem: Patient Education: Go to Patient Education Activity  Goal: Patient/Family Education  Outcome: Progressing Towards Goal     Problem: General Medical Care Plan  Goal: *Vital signs within specified parameters  Outcome: Progressing Towards Goal  Goal: *Labs within defined limits  Outcome: Progressing Towards Goal  Goal: *Absence of infection signs and symptoms  Description: Wash hand more often   Outcome: Progressing Towards Goal  Goal: *Optimal pain control at patient's stated goal  Outcome: Progressing Towards Goal  Goal: *Skin integrity maintained  Outcome: Progressing Towards Goal  Goal: *Fluid volume balance  Outcome: Progressing Towards Goal  Goal: *Optimize nutritional status  Outcome: Progressing Towards Goal  Goal: *Anxiety reduced or absent  Outcome: Progressing Towards Goal  Goal: *Progressive mobility and function (eg: ADL's)  Outcome: Progressing Towards Goal     Problem: Patient Education: Go to Patient Education Activity  Goal: Patient/Family Education  Outcome: Progressing Towards Goal     Problem: Patient Education: Go to Patient Education Activity  Goal: Patient/Family Education  Outcome: Progressing Towards Goal     Problem: Patient Education: Go to Patient Education Activity  Goal: Patient/Family Education  Outcome: Progressing Towards Goal     Problem: Patient Education: Go to Patient Education Activity  Goal: Patient/Family Education  Outcome: Progressing Towards Goal     Problem: Ischemic Stroke: Discharge Outcomes  Goal: *Verbalizes anxiety and depression are reduced or absent  Outcome: Progressing Towards Goal  Goal: *Verbalize understanding of risk factor modification(Stroke Metric)  Outcome: Progressing Towards Goal  Goal: *Hemodynamically stable  Outcome: Progressing Towards Goal  Goal: *Absence of aspiration pneumonia  Outcome: Progressing Towards Goal  Goal: *Aware of needed dietary changes  Outcome: Progressing Towards Goal  Goal: *Verbalize understanding of prescribed medications including anti-coagulants, anti-lipid, and/or anti-platelets(Stroke Metric)  Outcome: Progressing Towards Goal  Goal: *Tolerating diet  Outcome: Progressing Towards Goal  Goal: *Aware of follow-up diagnostics related to anticoagulants  Outcome: Progressing Towards Goal  Goal: *Ability to perform ADLs and demonstrates progressive mobility and function  Outcome: Progressing Towards Goal  Goal: *Absence of DVT(Stroke Metric)  Outcome: Progressing Towards Goal  Goal: *Absence of aspiration  Outcome: Progressing Towards Goal  Goal: *Optimal pain control at patient's stated goal  Outcome: Progressing Towards Goal  Goal: *Home safety concerns addressed  Outcome: Progressing Towards Goal  Goal: *Describes available resources and support systems  Outcome: Progressing Towards Goal  Goal: *Verbalizes understanding of activation of EMS(911) for stroke symptoms(Stroke Metric)  Outcome: Progressing Towards Goal  Goal: *Understands and describes signs and symptoms to report to providers(Stroke Metric)  Outcome: Progressing Towards Goal  Goal: *Neurolgocially stable (absence of additional neurological deficits)  Outcome: Progressing Towards Goal  Goal: *Verbalizes importance of follow-up with primary care physician(Stroke Metric)  Outcome: Progressing Towards Goal  Goal: *Smoking cessation discussed,if applicable(Stroke Metric)  Outcome: Progressing Towards Goal  Goal: *Depression screening completed(Stroke Metric)  Outcome: Progressing Towards Goal     Problem: Pain  Goal: *Control of Pain  Outcome: Progressing Towards Goal     Problem: Patient Education: Go to Patient Education Activity  Goal: Patient/Family Education  Outcome: Progressing Towards Goal     Problem: Patient Education: Go to Patient Education Activity  Goal: Patient/Family Education  Outcome: Progressing Towards Goal

## 2020-04-19 NOTE — PROGRESS NOTES
Problem: Mobility Impaired (Adult and Pediatric)  Goal: *Acute Goals and Plan of Care  Description  GOALS UPDATED ON RE-ASSESSMENT 4/19/2020 (ALL GOALS STILL APPLY) :  1. Patient will perform bed mobility with supervision and 0 verbal cues within 7 treatment days. 2. Patient will perform transfer bed to chair with SUPERVISION within 7 treatment days. 3. Patient will demonstrate fair+ dynamic balance throughout stance phase on L LE within 7 treatment days. 4. Patient will require STAND BY ASSIST throughout swing phase on (to advance) L LE within 7 treatment days. 5. Patient demonstrate 3+/5 strength in L LE hip flexion, hip abduction, and hip adduction within 7 treatment days. 6. Patient will ambulate 200ft+ with STAND BY ASSIST and least retrictive assistive device within 7 treatment days. 7. Patient will perform wheelchair mobility 150 ft with modified independence within 7 treatment days. 8. Mr. Fernando Bell will be independent with wheelchair locking/unlocking mechanism as well as leg rest manipulation within 7 days to improve safety and independence. 9. Mr. Fernando Bell will don/doff LUE sling for protection of L shoulder during transfers/gait within 7 treatment days. 10) Pt's spouse / caregiver comfortable & safe to assist pt with all functional mobility. 11) pt able to go up & down 12 steps with min A & bilateral rails.         PHYSICAL THERAPY: Re-evaluation and PM 4/19/2020  INPATIENT: PT Visit Days : 1  Payor: [id number] / Plan: [address] [place] [address] / Product Type: Medicaid /       NAME/AGE/GENDER: Gerri Orellana is a 53 y.o. male   PRIMARY DIAGNOSIS: Acute ischemic stroke ([address] [I63.9]  Cerebrovascular accident (CVA) due to embolism ([address] [F46.3] Acute embolic stroke ([address] Acute embolic stroke ([address]       ICD-10: Treatment Diagnosis:   · Difficulty in walking, Not elsewhere classified (R26.2)  · Hemiplegia and hemiparesis following cerebral infarction affecting left non-dominant side (G90.382)   Precaution/Allergies:  Patient has no known allergies. ASSESSMENT:     Mr. Alondra Altamirano is a 53year old admitted R MCA CVA. This pt continues to progress towards goals noted & has made remarkable progress overall, while in acute care. This pt could be manageable for caregiver in home setting at this point, being only CGA for all functional mobility, if caregiver was willing to take pt home & could get clearance to enter the hospital & see how pt is functioning, do training & address concerns, (again, if caregiver is willing & able). Pt would not be able to be left alone at home initially, but with follow up HHPT & OT therapies would continue to work toward independence in his home environment. PT made MSW & OT aware of possible option. This section established at most recent assessment   PROBLEM LIST (Impairments causing functional limitations):  1. Decreased Strength  2. Decreased ADL/Functional Activities  3. Decreased Transfer Abilities  4. Decreased Ambulation Ability/Technique  5. Decreased Balance  6. Increased Pain  7. Decreased Activity Tolerance  8. Increased Fatigue  9. Decreased Flexibility/Joint Mobility   INTERVENTIONS PLANNED: (Benefits and precautions of physical therapy have been discussed with the patient.)  1. Balance Exercise  2. Bed Mobility  3. Family Education  4. Gait Training  5. Home Exercise Program (HEP)  6. Manual Therapy  7. Neuromuscular Re-education/Strengthening  8. Range of Motion (ROM)  9. Therapeutic Activites  10. Therapeutic Exercise/Strengthening  11. Transfer Training     TREATMENT PLAN: Frequency/Duration: 5 times a week for duration of hospital stay  Rehabilitation Potential For Stated Goals: Good     REHAB RECOMMENDATIONS (at time of discharge pending progress):    Placement:   It is my opinion, based on this patient's performance to date, that Mr. Alondra Altamirano may benefit from intensive therapy at an [address] [place] [address] after discharge due to a probable need for close medical supervision by a rehab physician, a probable need for multiple therapy disciplines and potential to make ongoing and sustainable functional improvement that is of practical value. .  Equipment:    TBD pending progress with therapy. HISTORY:   History of Present Injury/Illness (Reason for Referral):  Per H&P: \"Pt is a 54 y/o smoker with DM, HTN, who presented to ER with L leg and arm weakness, L facial droop, dysarthria. First noted L leg weakness late night 12/11 when he woke up to go to the bathroom. He was normal when he went to bed around 1030. Woke up this morning and had persistent weakness L leg and also now noted in L arm. EMS called. Noted to have slurred speech and L facial droop as well. Code S called in ER around 9am.  MRI with acute infarcts in L cerebellar hemisphere, deep frontoparietal white matter, and R paramedian yariel. Also noted old lacunar infarcts. No large vessel occlusion on CTA, but some stenosis noted. No hx afib, TIA, CVA. No CP, palpitations, SOB. \"  Past Medical History/Comorbidities:   Mr. Romina Orlando  has a past medical history of Acute ischemic stroke ([address] (12/12/2019), Acute pancreatitis (11/19/2014), Cerebrovascular accident (CVA) due to embolism ([address] (12/12/2019), Diabetes ([address] (2002), Diabetes ([address] Diabetes mellitus, and Hypertension. He also has no past medical history of Arthritis, Asthma, Autoimmune disease ([address] CAD (coronary artery disease), Cancer ([address] Chronic kidney disease, COPD, Dementia, Dementia ([address] Heart failure ([address] Ill-defined condition, Infectious disease, Liver disease, Other ill-defined conditions(799.89), Psychiatric disorder, PUD (peptic ulcer disease), Seizures ([address] or Sleep disorder. Mr. Romina Orlando  has a past surgical history that includes hx hernia repair and hx orthopaedic.   Social History/Living Environment:   Home Environment: Apartment  # Steps to Enter: [phone] to Enter: Yes  Hand Rails : Bilateral  One/Two Story Residence: One story  Living Alone: No  Support Systems: Spouse/Significant Other/Partner  Patient Expects to be Discharged to[de-identified] Unknown  Current DME Used/Available at Home: None  Tub or Shower Type: Tub/Shower combination  Prior Level of Function/Work/Activity:  Independent, lives with wife in 2nd story 1 level apartment. No recent falls. Number of Personal Factors/Comorbidities that affect the Plan of Care: 1-2: MODERATE COMPLEXITY   EXAMINATION:   Most Recent Physical Functioning:   Gross Assessment: left hip grossly 2/5 to 3-/5                       Balance:  Sitting: Intact; Without support  Sitting - Static: (good)  Sitting - Dynamic: (fair)  Standing: Impaired; With support(luke-walker)  Standing - Static: (fair with walker)  Standing - Dynamic : (fair to fair- with luke-walker) Bed Mobility:  Rolling: Stand-by assistance  Supine to Sit: Stand-by assistance  Sit to Supine: Stand-by assistance  Scooting: Contact guard assistance  Wheelchair Mobility: NT     Transfers:  Sit to Stand: Contact guard assistance  Stand to Sit: Contact guard assistance  Bed to Chair: Contact guard assistance(with luke-walker, left ankle wrapped 10 deg of DF)  Duration: 25 Minutes(extra time to work through all activity noted)  Gait:     Base of Support: Narrowed; Shift to right  Speed/Clotilde: Delayed  Step Length: Left shortened  Stance: Left decreased  Gait Abnormalities: Circumduction; Hemiplegic  Distance (ft): 210 Feet (ft)  Assistive Device: Walker luke  Ambulation - Level of Assistance: Contact guard assistance  Interventions: Safety awareness training;Verbal cues      Body Structures Involved:  1. Nerves  2. Voice/Speech  3. Bones  4. Joints  5. Muscles Body Functions Affected:  1. Mental  2. Sensory/Pain  3. Neuromusculoskeletal  4. Movement Related Activities and Participation Affected:  1. General Tasks and Demands  2. Mobility  3. Self Care  4.  Interpersonal Interactions and Relationships   Number of elements that affect the Plan of Care: 4+: HIGH COMPLEXITY   CLINICAL PRESENTATION:   Presentation: Evolving clinical presentation with changing clinical characteristics: MODERATE COMPLEXITY   CLINICAL DECISION MAKIN [facility] Inpatient Short Form  How much difficulty does the patient currently have. .. Unable A Lot A Little None   1. Turning over in bed (including adjusting bedclothes, sheets and blankets)? [] 1   [] 2   [] 3   [x] 4   2. Sitting down on and standing up from a chair with arms ( e.g., wheelchair, bedside commode, etc.)   [] 1   [] 2   [x] 3   [] 4   3. Moving from lying on back to sitting on the side of the bed? [] 1   [] 2   [] 3   [x] 4   How much help from another person does the patient currently need. .. Total A Lot A Little None   4. Moving to and from a bed to a chair (including a wheelchair)? [] 1   [] 2   [x] 3   [] 4   5. Need to walk in hospital room? [] 1   [] 2   [x] 3   [] 4   6. Climbing 3-5 steps with a railing? [] 1   [x] 2   [] 3   [] 4   © , Trustees of [address] [place] [address], under license to [employer]. All rights reserved      Score:  Initial: 13 Most Recent: 19 (Date: 4/10/2020)    Interpretation of Tool:  Represents activities that are increasingly more difficult (i.e. Bed mobility, Transfers, Gait). Medical Necessity:     · Patient is expected to demonstrate progress in   · strength, range of motion, balance, coordination, and functional technique  ·  to   · increase independence with all mobility. · .  Reason for Services/Other Comments:  · Patient continues to require skilled intervention due to   · medical complications and mobility deficits which impact his level of function, safety, and independence as indicated above.    · .   Use of outcome tool(s) and clinical judgement create a POC that gives a: Questionable prediction of patient's progress: MODERATE COMPLEXITY TREATMENT:      Pre-treatment Symptoms/Complaints:  \" I am tired of being here \"  Pain: Initial: 0/10 Post Session:  0/10     Therapeutic Activity: (  25 Minutes(extra time to work through all activity noted) : Therapeutic activities including bed mobility training, transfer training, ambulation on level ground with cues for gait mechanics,  and patient education  to improve mobility, strength and balance. Required moderate Safety awareness training;Verbal cues to promote static and dynamic balance in standing and promote coordination of bilateral, lower extremity(s). Braces/Orthotics/Lines/Etc:   · Sling to LUE  Treatment/Session Assessment:    · Response to Treatment:  Pt tolerated well but appears very depressed & discouraged about his length of stay. · Interdisciplinary Collaboration:   o Occupational Therapist  o Registered Nurse  o   · After treatment position/precautions:   o Supine in bed  o Bed alarm/tab alert on  o Bed/Chair-wheels locked  o Bed in low position  o Call light within reach  o RN notified   · Compliance with Program/Exercises: Compliant all of the time  · Recommendations/Intent for next treatment session: \"Next visit will focus on advancements to more challenging activities and reduction in assistance provided\".     Total Treatment Duration:  PT Patient Time In/Time Out  Time In: 1255  Time Out: 1320    Shaka Estrada PT

## 2020-04-19 NOTE — PROGRESS NOTES
OT Note: Patient politely declined OT stating \"not today. \"  Plan to follow up at another time as schedule permits.

## 2020-04-19 NOTE — PROGRESS NOTES
Hospitalist Progress Note     Admit Date:  2019  9:07 AM   Name:  Chel Wood   Age:  53 y.o.  :  1975   MRN:  [id number]   PCP:  Fransisco Ames MD  Treatment Team: Attending Provider: Zaira Smith MD; Care Manager: Dwight Yanez [facility] [place]; Utilization Review: Bala Donnelly RN; Nurse Practitioner: Vee Monets NP; Care Manager: Melanie Whitfield RN; Charge Nurse: Dee De Leon; Physical Therapist: Blas Andrade PT    Subjective:   HPI and or CC:  54 yo AA male with PMH of DM, HTN admitted  for CVA affecting numerous areas of the brain. He was placed on ASA and statin. He has remained alert and oriented x3. Some movement to right side but unable to grasp with right hand. He is currently waiting on placement and this has remained difficult due to waiting on Medicaid. :  Alert and oriented x3. Voices no complaints today. Continues to wait for placement. On RA with saturations mid 90s. Continues to wait on SSI and Medicaid approval.  He remains afebrile, no leukocytosis. NIH remains 7. Objective:     Patient Vitals for the past 24 hrs:   Temp Pulse Resp BP SpO2   20 0800 97.4 °F (36.3 °C) 68 17 163/75 96 %   20 0400 97.9 °F (36.6 °C) 83 16 116/75 98 %   20 0000 97.2 °F (36.2 °C) 75 16 112/77 96 %   20 2000 98 °F (36.7 °C) 84 16 124/79 97 %   20 1600 97.7 °F (36.5 °C) 82 20 131/82 99 %   20 1200 97.6 °F (36.4 °C) 82 20 136/82 96 %     Oxygen Therapy  O2 Sat (%): 96 % (20 0800)  Pulse via Oximetry: 75 beats per minute (20 0400)  O2 Device: Room air (20 1500)    Intake/Output Summary (Last 24 hours) at 2020 0907  Last data filed at 2020 1200  Gross per 24 hour   Intake 240 ml   Output    Net 240 ml         REVIEW OF SYSTEMS: Comprehensive ROS performed and negative except as stated in HPI. Physical Examination:  General:       No acute distress    Lungs:          CTA bilaterally.  Resp even and nonlabored  Heart:            S1S2 present without murmurs rubs gallops. RRR. No LE edema  Abdomen:    Soft, non tender, non distended. BS present  Extremities: No cyanosis. Left sided hemiparesis  Neurologic:  moves right hand and raises arm at elbow moderately, unable to squeeze my fingers left hand, mildly slurred speech.  PERRLA, strength 4/5 RUE/RLE. Data Review:  I have reviewed all labs, meds, telemetry events, and studies from the last 24 hours.     Recent Results (from the past 24 hour(s))   GLUCOSE, POC    Collection Time: 04/18/20 11:00 AM   Result Value Ref Range    Glucose (POC) 113 (H) 65 - 100 mg/dL   GLUCOSE, POC    Collection Time: 04/18/20  3:41 PM   Result Value Ref Range    Glucose (POC) 115 (H) 65 - 100 mg/dL   GLUCOSE, POC    Collection Time: 04/18/20  8:32 PM   Result Value Ref Range    Glucose (POC) 119 (H) 65 - 100 mg/dL   GLUCOSE, POC    Collection Time: 04/19/20  7:31 AM   Result Value Ref Range    Glucose (POC) 119 (H) 65 - 100 mg/dL        All Micro Results     None          Current Meds:  Current Facility-Administered Medications   Medication Dose Route Frequency    metFORMIN (GLUCOPHAGE) tablet 500 mg  500 mg Oral BID WITH MEALS    insulin lispro (HUMALOG) injection 0-10 Units  0-10 Units SubCUTAneous AC&HS    magnesium oxide (MAG-OX) tablet 400 mg  400 mg Oral DAILY    acetaminophen (TYLENOL) tablet 650 mg  650 mg Oral Q4H PRN    diphenhydrAMINE (BENADRYL) capsule 25 mg  25 mg Oral Q6H PRN    acetaminophen (TYLENOL) tablet 650 mg  650 mg Oral Q8H    lip protectant (BLISTEX) ointment 1 Each  1 Each Topical PRN    metoprolol succinate (TOPROL-XL) XL tablet 25 mg  25 mg Oral DAILY    polyethylene glycol (MIRALAX) packet 17 g  17 g Oral DAILY PRN    guaiFENesin (ROBITUSSIN) 100 mg/5 mL oral liquid 100 mg  100 mg Oral Q4H PRN    hydrALAZINE (APRESOLINE) 20 mg/mL injection 20 mg  20 mg IntraVENous Q4H PRN    atorvastatin (LIPITOR) tablet 80 mg  80 mg Oral QHS    lisinopril (PRINIVIL, ZESTRIL) tablet 40 mg  40 mg Oral DAILY    sodium chloride (NS) flush 5-40 mL  5-40 mL IntraVENous PRN    ondansetron (ZOFRAN) injection 4 mg  4 mg IntraVENous Q4H PRN    aspirin chewable tablet 81 mg  81 mg Oral DAILY    enoxaparin (LOVENOX) injection 40 mg  40 mg SubCUTAneous Q24H    dextrose 40% (GLUTOSE) oral gel 1 Tube  15 g Oral PRN    glucagon (GLUCAGEN) injection 1 mg  1 mg IntraMUSCular PRN    dextrose (D50W) injection syrg 12.5-25 g  25-50 mL IntraVENous PRN       Diet:  DIET NUTRITIONAL SUPPLEMENTS  DIET DIABETIC CONSISTENT CARB    Other Studies (last 24 hours):  No results found. Assessment and Plan:     Hospital Problems as of 4/19/2020 Date Reviewed: 3/3/2017          Codes Class Noted - Resolved POA    Hypomagnesemia ICD-10-CM: E83.42  ICD-9-CM: 275.2  3/7/2020 - Present Unknown        PFO (patent foramen ovale) ICD-10-CM: Q21.1  ICD-9-CM: 745.5  12/17/2019 - Present Yes        Arrhythmia ICD-10-CM: I49.9  ICD-9-CM: 427.9  12/15/2019 - Present Yes        Hyperlipemia ICD-10-CM: E78.5  ICD-9-CM: 272.4  12/15/2019 - Present Yes        * (Principal) Acute embolic stroke ([facility] FIN-73-YL: I63.9  ICD-9-CM: 434.11  12/12/2019 - Present Yes        Hypertension (Chronic) ICD-10-CM: I10  ICD-9-CM: 401.9  Unknown - Present Yes        Type 2 diabetes mellitus (HCC) (Chronic) ICD-10-CM: E11.9  ICD-9-CM: 250.00  Unknown - Present Yes              A/P:    Acute embolic stroke  MRI brain showed acute to early subacute infarcts in the left cerebellar hemisphere, in the periventricular deep left frontoparietal white matter and likely additional areas of restricted diffusion in the right paramedian yariel.    +PFO on echo  On ASA, statin  Will need 4 week event monitor on DC, if no arrhythmia then will need PFO closure      Hypomagnesemia  Resolved       Hypertension  Continue metoprolol and ACE inhibitor  Goal BP <130/80     Type 2 diabetes mellitus:    Monitor, BGL controlled        Signed:  Manny Caba Sami Botello, NP

## 2020-04-20 LAB
GLUCOSE BLD STRIP.AUTO-MCNC: 116 MG/DL (ref 65–100)
GLUCOSE BLD STRIP.AUTO-MCNC: 124 MG/DL (ref 65–100)
GLUCOSE BLD STRIP.AUTO-MCNC: 85 MG/DL (ref 65–100)
GLUCOSE BLD STRIP.AUTO-MCNC: 99 MG/DL (ref 65–100)

## 2020-04-20 PROCEDURE — 74011250637 HC RX REV CODE- 250/637: Performed by: NURSE PRACTITIONER

## 2020-04-20 PROCEDURE — 74011250637 HC RX REV CODE- 250/637: Performed by: INTERNAL MEDICINE

## 2020-04-20 PROCEDURE — 82962 GLUCOSE BLOOD TEST: CPT

## 2020-04-20 PROCEDURE — 97110 THERAPEUTIC EXERCISES: CPT

## 2020-04-20 PROCEDURE — 97530 THERAPEUTIC ACTIVITIES: CPT

## 2020-04-20 PROCEDURE — 65270000029 HC RM PRIVATE

## 2020-04-20 PROCEDURE — 74011250636 HC RX REV CODE- 250/636: Performed by: INTERNAL MEDICINE

## 2020-04-20 PROCEDURE — 74011250637 HC RX REV CODE- 250/637: Performed by: HOSPITALIST

## 2020-04-20 RX ADMIN — DIPHENHYDRAMINE HYDROCHLORIDE 25 MG: 25 CAPSULE ORAL at 17:17

## 2020-04-20 RX ADMIN — ASPIRIN 81 MG 81 MG: 81 TABLET ORAL at 08:29

## 2020-04-20 RX ADMIN — Medication 400 MG: at 08:29

## 2020-04-20 RX ADMIN — GUAIFENESIN 100 MG: 100 SOLUTION ORAL at 17:17

## 2020-04-20 RX ADMIN — LISINOPRIL 40 MG: 20 TABLET ORAL at 08:30

## 2020-04-20 RX ADMIN — ACETAMINOPHEN 650 MG: 325 TABLET, FILM COATED ORAL at 15:03

## 2020-04-20 RX ADMIN — ENOXAPARIN SODIUM 40 MG: 40 INJECTION SUBCUTANEOUS at 15:03

## 2020-04-20 RX ADMIN — METFORMIN HYDROCHLORIDE 500 MG: 500 TABLET ORAL at 08:29

## 2020-04-20 RX ADMIN — ACETAMINOPHEN 650 MG: 325 TABLET, FILM COATED ORAL at 22:03

## 2020-04-20 RX ADMIN — METFORMIN HYDROCHLORIDE 500 MG: 500 TABLET ORAL at 17:17

## 2020-04-20 RX ADMIN — ACETAMINOPHEN 650 MG: 325 TABLET, FILM COATED ORAL at 06:33

## 2020-04-20 RX ADMIN — METOPROLOL SUCCINATE 25 MG: 25 TABLET, FILM COATED, EXTENDED RELEASE ORAL at 08:30

## 2020-04-20 RX ADMIN — ATORVASTATIN CALCIUM 80 MG: 80 TABLET, FILM COATED ORAL at 22:03

## 2020-04-20 NOTE — PROGRESS NOTES
Progress Note    2020  Admit Date: 2019  9:07 AM   NAME: Ryne Loera   :  1975   MRN:  [id number]   Attending: Bishnu Moss MD  PCP:  Betito Boss MD  Treatment Team: Attending Provider: Bishnu Moss MD; Care Manager: Audrey Del Valle LMSW; Utilization Review: Ivy Schmitz RN; Nurse Practitioner: Elia Mcburney, NP; Care Manager: Carmen Macias RN; Charge Nurse: Jessica Martinez; Physical Therapy Assistant: Mirian Mcadams PTA; Occupational Therapist: Kaylin Chavez    Full Code   SUBJECTIVE:   Mr. Naun Hampton is a 52 yo male with PMH of DM, HTN who presented with c/o left sided weakness and left facial droop 19.   CT head showed age indeterminate infarctions within the left corona radiata/caudate.  CTA head/neck showed stenosis at the distal segment of the right vertebral artery and multifocal stenosis in the P2 segment of the left posterior cerebral artery. MRI brain showed acute to early subacute infarcts in the left cerebellar hemisphere, in the periventricular deep left frontoparietal white matter and likely additional areas of restricted diffusion in the right paramedian yariel.   Echo +PFO.  On statin, ASA.  Has been difficult to place in STR. Plans for 4 week event monitor on DC and if no arrhythmia PFO closure recommended. Pt remains stable today. Awaiting placement, medicaid pending.  Denies complaints.     Past Medical History:   Diagnosis Date    Acute ischemic stroke ([address] 2019    Acute pancreatitis 2014    Cerebrovascular accident (CVA) due to embolism ([address] 2019    Diabetes ([address] 2002    Diabetes ([address]     Diabetes mellitus     Hypertension      Recent Results (from the past 24 hour(s))   GLUCOSE, POC    Collection Time: 20  3:58 PM   Result Value Ref Range    Glucose (POC) 177 (H) 65 - 100 mg/dL   GLUCOSE, POC    Collection Time: 20  9:22 PM   Result Value Ref Range    Glucose (POC) 111 (H) 65 - 100 mg/dL   GLUCOSE, POC    Collection Time: 04/20/20  7:15 AM   Result Value Ref Range    Glucose (POC) 99 65 - 100 mg/dL   GLUCOSE, POC    Collection Time: 04/20/20 11:28 AM   Result Value Ref Range    Glucose (POC) 116 (H) 65 - 100 mg/dL     No Known Allergies  Current Facility-Administered Medications   Medication Dose Route Frequency Provider Last Rate Last Dose    metFORMIN (GLUCOPHAGE) tablet 500 mg  500 mg Oral BID WITH MEALS Marla Tatum NP   500 mg at 04/20/20 8867    insulin lispro (HUMALOG) injection 0-10 Units  0-10 Units SubCUTAneous AC&HS Ashlee BrownMARCELO basurto   Stopped at 04/19/20 2200    magnesium oxide (MAG-OX) tablet 400 mg  400 mg Oral DAILY Maegan Courtney MD   400 mg at 04/20/20 3572    acetaminophen (TYLENOL) tablet 650 mg  650 mg Oral Q4H PRN Rachel Kiran MD   650 mg at 04/10/20 1818    diphenhydrAMINE (BENADRYL) capsule 25 mg  25 mg Oral Q6H PRN Natalie Rangel MD   25 mg at 04/19/20 1710    acetaminophen (TYLENOL) tablet 650 mg  650 mg Oral Jagdeep Wong MD   650 mg at 04/20/20 2640    lip protectant (BLISTEX) ointment 1 Each  1 Each Topical PRN Jill Freeman MD        metoprolol succinate (TOPROL-XL) XL tablet 25 mg  25 mg Oral DAILY Rachel Kiran MD   25 mg at 04/20/20 0830    polyethylene glycol (MIRALAX) packet 17 g  17 g Oral DAILY PRN Eboni TARIQ DO   17 g at 02/23/20 1708    guaiFENesin (ROBITUSSIN) 100 mg/5 mL oral liquid 100 mg  100 mg Oral Q4H PRN Rachel Kiran MD   100 mg at 04/19/20 1710    hydrALAZINE (APRESOLINE) 20 mg/mL injection 20 mg  20 mg IntraVENous Q4H PRN Shana Montanez MD   20 mg at 04/17/20 0510    atorvastatin (LIPITOR) tablet 80 mg  80 mg Oral QHS Shana Montanez MD   80 mg at 04/19/20 2200    lisinopril (PRINIVIL, ZESTRIL) tablet 40 mg  40 mg Oral DAILY Shana Montanez MD   40 mg at 04/20/20 0830    sodium chloride (NS) flush 5-40 mL  5-40 mL IntraVENous PRN Shana Montanez MD   10 mL at 04/14/20 6304    ondansetron (ZOFRAN) injection 4 mg  4 mg IntraVENous Q4H PRN Laina Adams MD        aspirin chewable tablet 81 mg  81 mg Oral DAILY Laina Adams MD   81 mg at 20 1099    enoxaparin (LOVENOX) injection 40 mg  40 mg SubCUTAneous Q24H Laina Adams MD   40 mg at 20 1446    dextrose 40% (GLUTOSE) oral gel 1 Tube  15 g Oral PRN Laina Adams MD        glucagon [facility]) injection 1 mg  1 mg IntraMUSCular PRN Laina Adams MD        dextrose (D50W) injection syrg 12.5-25 g  25-50 mL IntraVENous PRN Laina Adams MD           Review of Systems negative with exception of pertinent positives noted above  PHYSICAL EXAM     Visit Vitals  BP (!) 126/91 (BP 1 Location: Right arm, BP Patient Position: Sitting)   Pulse 79   Temp 98.4 °F (36.9 °C)   Resp 16   Ht 5' 6\" (1.676 m)   Wt 79.8 kg (175 lb 14.4 oz)   SpO2 96%   BMI 28.39 kg/m²      Temp (24hrs), Av.1 °F (36.7 °C), Min:97.9 °F (36.6 °C), Max:98.4 °F (36.9 °C)    Oxygen Therapy  O2 Sat (%): 96 % (20 0800)  Pulse via Oximetry: 75 beats per minute (20 0400)  O2 Device: Room air (20 1500)  No intake or output data in the 24 hours ending 20 1248       General:       No acute distress    Lungs:          CTA bilaterally. Resp even and nonlabored  Heart:            S1S2 present without murmurs rubs gallops. RRR. No LE edema  Abdomen:    Soft, non tender, non distended. BS present  Extremities: No cyanosis. Left sided hemiparesis  Neurologic:  moves right hand and raises arm at elbow moderately, unable to squeeze my fingers left hand, mildly slurred speech.  PERRLA, strength 4/5 RUE/RLE.     Results summary of Diagnostic Studies/Procedures copied from within [facility] EMR:      [address] Problems    Diagnosis Date Noted    Hypomagnesemia 2020    PFO (patent foramen ovale) 2019    Arrhythmia 12/15/2019    Hyperlipemia     Acute embolic stroke ([facility] 12/12/2019    Type 2 diabetes mellitus ([facility]     Hypertension      Plan:  Acute embolic stroke  MRI brain showed acute to early subacute infarcts in the left cerebellar hemisphere, in the periventricular deep left frontoparietal white matter and likely additional areas of restricted diffusion in the right paramedian yariel. +PFO on echo  On ASA, statin  Will need 4 week event monitor on DC, if no arrhythmia then will need PFO closure      Hypomagnesemia  Received IV, and on oral supplementation.      Hypertension  Continue metoprolol and ACE inhibitor  Goal BP <130/80     Type 2 diabetes mellitus:    Monitor, BGL controlled   A1C 6.9      Medically stable for DC.  Awaiting Medicaid approval.         Notes, labs, VS, diagnostic testing reviewed  Time spent with pt 20 min          DVT Prophylaxis: lovenox  Plan of Care Discussed with: Supervising MD Dr. Nitin Clarke, care team, pt      Jerry Culp NP          Given no visitor policy offered to call wife to update on plan of care, pt states he communicates with her and would update her.

## 2020-04-20 NOTE — PROGRESS NOTES
04/20/20 1905   NIH Stroke Scale   Interval Other (comment)  (NIH at bedside with JERRY Thomas)   LOC 0   LOC Questions 0   LOC Commands 0   Best Gaze 0   Visual 0   Facial Palsy 1   Motor Right Arm 0   Motor Left Arm 2   Motor Right Leg 0   Motor Left Leg 2   Limb Ataxia 0   Sensory 0   Best Language 0   Dysarthria 1   Extinction and Inattention 0   Total 6

## 2020-04-20 NOTE — PROGRESS NOTES
Problem: Mobility Impaired (Adult and Pediatric)  Goal: *Acute Goals and Plan of Care  Description  GOALS UPDATED ON RE-ASSESSMENT 4/19/2020 (ALL GOALS STILL APPLY) :  1. Patient will perform bed mobility with supervision and 0 verbal cues within 7 treatment days. 2. Patient will perform transfer bed to chair with SUPERVISION within 7 treatment days. 3. Patient will demonstrate fair+ dynamic balance throughout stance phase on L LE within 7 treatment days. 4. Patient will require STAND BY ASSIST throughout swing phase on (to advance) L LE within 7 treatment days. 5. Patient demonstrate 3+/5 strength in L LE hip flexion, hip abduction, and hip adduction within 7 treatment days. 6. Patient will ambulate 200ft+ with STAND BY ASSIST and least retrictive assistive device within 7 treatment days. 7. Patient will perform wheelchair mobility 150 ft with modified independence within 7 treatment days. 8. Mr. Sneha Munguia will be independent with wheelchair locking/unlocking mechanism as well as leg rest manipulation within 7 days to improve safety and independence. 9. Mr. Sneha Munguia will don/doff LUE sling for protection of L shoulder during transfers/gait within 7 treatment days. 10) Pt's spouse / caregiver comfortable & safe to assist pt with all functional mobility. 11) pt able to go up & down 12 steps with min A & bilateral rails.         PHYSICAL THERAPY: Daily Note and PM 4/20/2020  INPATIENT: PT Visit Days : 2  Payor: [id number] [place], [place] [address]y [address] / Plan: [address] [place] [address] / Product Type: Medicaid /       NAME/AGE/GENDER: Fernanda Vieyra is a 53 y.o. male   PRIMARY DIAGNOSIS: Acute ischemic stroke ([address] [I63.9]  Cerebrovascular accident (CVA) due to embolism ([address] [N44.5] Acute embolic stroke ([address] Acute embolic stroke ([address]       ICD-10: Treatment Diagnosis:   · Difficulty in walking, Not elsewhere classified (R26.2)  · Hemiplegia and hemiparesis following cerebral infarction affecting left non-dominant side (F94.077)   Precaution/Allergies:  Patient has no known allergies. ASSESSMENT:     Mr. Henriquez is a 53year old admitted R MCA CVA.  Patient was supine upon contact and agreeable to PT. Patient performs supine to sit with supervision and additional time. Patient attempted to don sling on LUE on his own power which he did pretty good requiring min assist and cues for technique. Patient transfers to standing with CGA-SBA. Patient then ambulates 325' with luke walker, and focus on positioning of L foot during strike (heel first) and stance phase on L to improve external rotation. Patient needed cues periodically throughout gait to improve this and have proper gait sequencing. Wheelchair in tow for safety. Gait mechanics decline as patient fatigues requiring increased cues. Patient then performs wheelchair mobility x 100' with several rest breaks and cues for improved/proper technique. Patient returns to his room where he requests to sit on [facility]. Patient ambulates 13' to [facility] with luke walker and SBA. Patient left sitting up on [facility] with call light in reach. Patient instructed to call nsg staff when he is done to allow them to assist him off BSC. Patient verbalizes understanding. Overall good progress towards physical therapy goals. Goals listed above are still appropriate. Will continue efforts as patient is still below functional baseline. This section established at most recent assessment   PROBLEM LIST (Impairments causing functional limitations):  1. Decreased Strength  2. Decreased ADL/Functional Activities  3. Decreased Transfer Abilities  4. Decreased Ambulation Ability/Technique  5. Decreased Balance  6. Increased Pain  7. Decreased Activity Tolerance  8. Increased Fatigue  9. Decreased Flexibility/Joint Mobility   INTERVENTIONS PLANNED: (Benefits and precautions of physical therapy have been discussed with the patient.)  1. Balance Exercise  2. Bed Mobility  3. Family Education  4.  Gait Training  5. Home Exercise Program (HEP)  6. Manual Therapy  7. Neuromuscular Re-education/Strengthening  8. Range of Motion (ROM)  9. Therapeutic Activites  10. Therapeutic Exercise/Strengthening  11. Transfer Training     TREATMENT PLAN: Frequency/Duration: 5 times a week for duration of hospital stay  Rehabilitation Potential For Stated Goals: Good     REHAB RECOMMENDATIONS (at time of discharge pending progress):    Placement: It is my opinion, based on this patient's performance to date, that Mr. Annie Sykes may benefit from intensive therapy at an [address] [place] [address] after discharge due to a probable need for close medical supervision by a rehab physician, a probable need for multiple therapy disciplines and potential to make ongoing and sustainable functional improvement that is of practical value. .  Equipment:    TBD pending progress with therapy. HISTORY:   History of Present Injury/Illness (Reason for Referral):  Per H&P: \"Pt is a 52 y/o smoker with DM, HTN, who presented to ER with L leg and arm weakness, L facial droop, dysarthria. First noted L leg weakness late night 12/11 when he woke up to go to the bathroom. He was normal when he went to bed around 1030. Woke up this morning and had persistent weakness L leg and also now noted in L arm. EMS called. Noted to have slurred speech and L facial droop as well. Code S called in ER around 9am.  MRI with acute infarcts in L cerebellar hemisphere, deep frontoparietal white matter, and R paramedian yariel. Also noted old lacunar infarcts. No large vessel occlusion on CTA, but some stenosis noted. No hx afib, TIA, CVA. No CP, palpitations, SOB. \"  Past Medical History/Comorbidities:   Mr. Annie Sykes  has a past medical history of Acute ischemic stroke ([address] (12/12/2019), Acute pancreatitis (11/19/2014), Cerebrovascular accident (CVA) due to embolism ([address] (12/12/2019), Diabetes ([address] (2002), Diabetes ([address] Diabetes mellitus, and Hypertension. He also has no past medical history of Arthritis, Asthma, Autoimmune disease ([address] CAD (coronary artery disease), Cancer ([address] Chronic kidney disease, COPD, Dementia, Dementia ([address] Heart failure ([facility] Ill-defined condition, Infectious disease, Liver disease, Other ill-defined conditions(799.89), Psychiatric disorder, PUD (peptic ulcer disease), Seizures ([facility] or Sleep disorder. Mr. Sebastián Ramos  has a past surgical history that includes hx hernia repair and hx orthopaedic. Social History/Living Environment:   Home Environment: Apartment  # Steps to Enter: 12  Rails to Enter: Yes  [employer] : Bilateral  One/Two Story Residence: One story  Living Alone: No  Support Systems: Spouse/Significant Other/Partner  Patient Expects to be Discharged to[de-identified] Unknown  Current DME Used/Available at Home: None  Tub or Shower Type: Tub/Shower combination  Prior Level of Function/Work/Activity:  Independent, lives with wife in 2nd story 1 level apartment. No recent falls. Number of Personal Factors/Comorbidities that affect the Plan of Care: 1-2: MODERATE COMPLEXITY   EXAMINATION:   Most Recent Physical Functioning:   Gross Assessment: left hip grossly 2/5 to 3-/5                       Balance:  Sitting: Intact  Standing: Impaired  Standing - Static: Good  Standing - Dynamic : Fair Bed Mobility:  Supine to Sit: Supervision  Wheelchair Mobility: NT  Distance (ft): 100 feet  Level of Assistance: Stand-by assistance  Interventions: Verbal cues  Transfers:  Sit to Stand: Contact guard assistance;Stand-by assistance  Stand to Sit: Contact guard assistance;Stand-by assistance  Gait:     Base of Support: Narrowed; Shift to right  Speed/Clotilde: Delayed; Slow  Step Length: Left shortened  Gait Abnormalities: Hemiplegic  Distance (ft): 325 Feet (ft)  Assistive Device: Walker luke  Ambulation - Level of Assistance: Contact guard assistance  Interventions: Safety awareness training; Tactile cues; Verbal cues      Body Structures Involved:  1. Nerves  2. Voice/Speech  3. Bones  4. Joints  5. Muscles Body Functions Affected:  1. Mental  2. Sensory/Pain  3. Neuromusculoskeletal  4. Movement Related Activities and Participation Affected:  1. General Tasks and Demands  2. Mobility  3. Self Care  4. Interpersonal Interactions and Relationships   Number of elements that affect the Plan of Care: 4+: HIGH COMPLEXITY   CLINICAL PRESENTATION:   Presentation: Evolving clinical presentation with changing clinical characteristics: MODERATE COMPLEXITY   CLINICAL DECISION MAKIN [facility] Short Form  How much difficulty does the patient currently have. .. Unable A Lot A Little None   1. Turning over in bed (including adjusting bedclothes, sheets and blankets)? [] 1   [] 2   [] 3   [x] 4   2. Sitting down on and standing up from a chair with arms ( e.g., wheelchair, bedside commode, etc.)   [] 1   [] 2   [x] 3   [] 4   3. Moving from lying on back to sitting on the side of the bed? [] 1   [] 2   [] 3   [x] 4   How much help from another person does the patient currently need. .. Total A Lot A Little None   4. Moving to and from a bed to a chair (including a wheelchair)? [] 1   [] 2   [x] 3   [] 4   5. Need to walk in hospital room? [] 1   [] 2   [x] 3   [] 4   6. Climbing 3-5 steps with a railing? [] 1   [x] 2   [] 3   [] 4   © , [facility] of [address] [place] [address], under license to [employer]. All rights reserved      Score:  Initial: 13 Most Recent: 19 (Date: 4/10/2020)    Interpretation of Tool:  Represents activities that are increasingly more difficult (i.e. Bed mobility, Transfers, Gait). Medical Necessity:     · Patient is expected to demonstrate progress in   · strength, range of motion, balance, coordination, and functional technique  ·  to   · increase independence with all mobility.    · .  Reason for Services/Other Comments:  · Patient continues to require skilled intervention due to   · medical complications and mobility deficits which impact his level of function, safety, and independence as indicated above. · .   Use of outcome tool(s) and clinical judgement create a POC that gives a: Questionable prediction of patient's progress: MODERATE COMPLEXITY        TREATMENT:      Pre-treatment Symptoms/Complaints: see above  Pain: Initial: 0/10 Post Session:  0/10     Therapeutic Activity: (    31 Minutes) : Therapeutic activities including bed mobility training, transfer training from various surface heights, ambulation on level ground with cues for gait mechanics, positioning of LLE during gait, wheel chair mobility/mechanics, and patient education  to improve mobility, strength and balance. Required moderate Safety awareness training; Tactile cues; Verbal cues to promote static and dynamic balance in standing and promote coordination of bilateral, lower extremity(s). Braces/Orthotics/Lines/Etc:   · Sling to LUE  Treatment/Session Assessment:    · Response to Treatment:  See above  · Interdisciplinary Collaboration:   o Physical Therapy Assistant  o Registered Nurse  · After treatment position/precautions:   o Bed/Chair-wheels locked  o Call light within reach  o RN notified  o up on [address] CNA aware   · Compliance with Program/Exercises: Compliant all of the time  · Recommendations/Intent for next treatment session: \"Next visit will focus on advancements to more challenging activities and reduction in assistance provided\".     Total Treatment Duration:  PT Patient Time In/Time Out  Time In: 1338  Time Out: Toby [address] Mg

## 2020-04-20 NOTE — PROGRESS NOTES
Problem: Self Care Deficits Care Plan (Adult)  Goal: *Acute Goals and Plan of Care (Insert Text)  Description  Goals per Re-evaluation on 3/18/2020:   1. Patient will demonstrate appropriate safety awareness and protection of L UE during bed mobility and functional transfers with minimal cues. (Progressing, still needs cues, 4/14/20)  2. Patient will complete total body bathing and dressing with Min A and adaptive equipment as needed. (Progressing 4/14/20  3. Patient will complete weightbearing into the L UE with ADL tasks with minimal assistance to improve ability to use as a functional assist during ADL tasks. (Progressing 4/14/20)4. Patient will demonstrate L UE SROM HEP within 7 days. (Progressing, 3/18/2020)  5. Pt will complete bed mobility with Mod I and minimal verbal cueing (Progressing, Supervision, 4/14/20). 6. Pt will complete dynamic sitting balance for ADLs at mod I with good balance (Progressing, 4/14/20  7. Pt will complete functional transfers with Supervision with equipment as needed. (Progressing, SBA to CGA 4/14/20)  8. Pt will complete grooming tasks in standing at sink level x5 mins with good balance and equipment as needed MET  9. Pt will complete grooming standing at sink level with SBA and use of adaptive equipment as needed. MET  10. Pt will don/doff UE sling with SBA and use of minimal verbal and visual cueing for correct application (Progressing, Min A 4/14/20. Goals per Re-evaluation on 3/27/2020:   1. Patient will demonstrate appropriate safety awareness and protection of L UE during bed mobility and functional transfers with no verbal cues. CONTINUE   2. Patient will complete lower body bathing and dressing with Min A and adaptive equipment as needed. CONTINUE  3. Patient will complete upper body bathing and dressing with SBA and adaptive equipment as needed.  CONTINUE  4. Patient will complete weightbearing into the L UE with ADL tasks with minimal assistance to improve ability to use as a functional assist during ADL tasks. CONTINUE  5. Patient will demonstrate L UE SROM HEP within 7 days. CONTINUE  6. Pt will complete bed mobility with Mod I and no verbal cueing. CONTINUE  7. Pt will complete functional transfers with Supervision with equipment as needed. CONTINUE  8. Pt will don/doff UE sling with Mod I and no verbal cueing. CONTINUE  9. Pt will complete toileting with SBA for toilet transfer and gown management. CONTINUE  10. Patient will participate in using L UE as a functional assist for ADL for 15 minutes with minimal facilitation. NEW GOAL 4/14/20  11. Patient will complete sit to stand at midline with minimal assistance and cueing. NEW GOAL 4/14/20  12. Patient will complete therapeutic exercises with L UE with minimal tactile cues for facilitation of the LUE. NeW GOAL 4/14/20    Outcome: Progressing Towards Goal     OCCUPATIONAL THERAPY: Daily Note and PM    4/20/2020  INPATIENT: OT Visit Days: 5  Payor: [id number] / Plan: [place] / Product Type: Medicaid /      NAME/AGE/GENDER: Gerri Orellana is a 53 y.o. male   PRIMARY DIAGNOSIS:  Acute ischemic stroke ([address] [I63.9]  Cerebrovascular accident (CVA) due to embolism ([address] [B19.6] Acute embolic stroke ([address] Acute embolic stroke ([address]       ICD-10: Treatment Diagnosis:    · Generalized Muscle Weakness (M62.81)  · Other lack of cordination (R27.8)  · Hemiplegia and hemiparesis following cerebral infarction affecting   · left non-dominant side (I69.354)  · Abnormal posture (R29.3)   Precautions/Allergies:    NO PULLING ON LUE   LUE in sling with shoulder joint approximated and supported, needs taping   Patient has no known allergies. ASSESSMENT:     Mr. Fernando Bell presents to the hospital with L sided weakness and acute ischemic CVA. MRI revealed acute to subacute L cerebellar and R paramedian yariel infarcts.      4/20/2020: Pt seated upright in recliner chair upon arrival. Pt alert and agreeable to be seen by therapy. Pt completed PROM exercises in L UE with use of R UE to assist. Pt is demonstrating retention of exercises taught in prior sessions and is able to complete with minimal verbal and visual cueing from therapy. Pt does require moderate tactile and manual cues for increased PROM. Pt required CGA for scooting closer to edge of chair. Pt required CGA with use of keanu walker for sit to stand and to walk from recliner chair to wheelchair to prepare to be taken outside with Ambulation Team Staff. Pt left seated upright in wheelchair with Ambulation Team Members. All needs met and within reach at this time. RN notified. Will continue POC. This section established at most recent assessment   PROBLEM LIST (Impairments causing functional limitations):  1. Decreased Strength  2. Decreased ADL/Functional Activities  3. Decreased Transfer Abilities  4. Decreased Ambulation Ability/Technique  5. Decreased Balance  6. Decreased Activity Tolerance  7. Increased Fatigue  8. Decreased Flexibility/Joint Mobility  9. Decreased Knowledge of Precautions  10. Decreased Girard with Home Exercise Program   INTERVENTIONS PLANNED: (Benefits and precautions of occupational therapy have been discussed with the patient.)  1. Activities of daily living training  2. Adaptive equipment training  3. Balance training  4. Clothing management  5. Cognitive training  6. Donning&doffing training  7. Keanu tech training  8. Neuromuscular re-eduation  9. Therapeutic activity  10. Therapeutic exercise     TREATMENT PLAN: Frequency/Duration: Follow patient 3 times per week to address above goals. Rehabilitation Potential For Stated Goals: Excellent     REHAB RECOMMENDATIONS (at time of discharge pending progress):    Placement:   It is my opinion, based on this patient's performance to date, that Mr. Luc Gómez may benefit from intensive therapy at an [address] [place] [address] after discharge due to potential to make ongoing and sustainable functional improvement that is of practical value. Martha Leader Pt functioning far below independent baseline, demonstrating good improvement and participation. Pt would likely benefit greatly and increase independence from inpatient rehab stay. Equipment:    TBD               OCCUPATIONAL PROFILE AND HISTORY:   History of Present Injury/Illness (Reason for Referral):  See H&P  Past Medical History/Comorbidities:   Mr. Sherine Joshi  has a past medical history of Acute ischemic stroke ([address] (12/12/2019), Acute pancreatitis (11/19/2014), Cerebrovascular accident (CVA) due to embolism ([address] (12/12/2019), Diabetes ([address] (2002), Diabetes ([address] Diabetes mellitus, and Hypertension. He also has no past medical history of Arthritis, Asthma, Autoimmune disease ([address] CAD (coronary artery disease), Cancer ([address] Chronic kidney disease, COPD, Dementia, Dementia ([address] Heart failure ([address] Ill-defined condition, Infectious disease, Liver disease, Other ill-defined conditions(799.89), Psychiatric disorder, PUD (peptic ulcer disease), Seizures ([address] or Sleep disorder. Mr. Sherine Joshi  has a past surgical history that includes hx hernia repair and hx orthopaedic. Social History/Living Environment:   Home Environment: Apartment  # Steps to Enter: 12  Rails to Enter: Yes  [employer] : Bilateral  One/Two Story Residence: One story  Living Alone: No  Support Systems: Spouse/Significant Other/Partner  Patient Expects to be Discharged to[de-identified] Unknown  Current DME Used/Available at Home: None  Tub or Shower Type: Tub/Shower combination  Prior Level of Function/Work/Activity:  Pt lives at home with his wife. Pt is typically independent with ADL/functional mobility. Pt does not drive. Pt was working part-time at [employer]. Personal Factors:          Age:  53 y.o.         Past/Current Experience:  CVA with flaccid L side        Other factors that influence how disability is experienced by the patient:  multiple co-morbidities Number of Personal Factors/Comorbidities that affect the Plan of Care: Extensive review of physical, cognitive, and psychosocial performance (3+):  HIGH COMPLEXITY   ASSESSMENT OF OCCUPATIONAL PERFORMANCE[de-identified]   Activities of Daily Living:   Basic ADLs (From Assessment) Complex ADLs (From Assessment)   Feeding: Setup  Oral Facial Hygiene/Grooming: Minimum assistance  Bathing: Minimum assistance, Moderate assistance  Upper Body Dressing: Minimum assistance  Lower Body Dressing: Moderate assistance  Toileting: Minimum assistance Instrumental ADL  Meal Preparation: Total assistance  Homemaking: Total assistance  Medication Management: Total assistance  Financial Management: Total assistance   Grooming/Bathing/Dressing Activities of Daily Living         Upper Body Bathing  Bathing Assistance: Min A   Position Performed: Seated in chair                  Lower Body Dressing Assistance  Socks:  Total assistance (dependent) Bed/Mat Mobility  Supine to Sit: Supervision  Sit to Stand: Contact guard assistance  Stand to Sit: Contact guard assistance  Scooting: Contact guard assistance     Most Recent Physical Functioning:   Gross Assessment:                  Posture:     Balance:  Sitting: Intact  Standing: Impaired  Standing - Static: Good  Standing - Dynamic : Fair Bed Mobility:  Supine to Sit: Supervision  Scooting: Contact guard assistance  Wheelchair Mobility:  Distance (ft): 100 feet  Level of Assistance: Stand-by assistance  Interventions: Verbal cues  Transfers:  Sit to Stand: Contact guard assistance  Stand to Sit: Contact guard assistance            Patient Vitals for the past 6 hrs:   BP BP Patient Position SpO2 Pulse   04/20/20 1200 (!) 126/91 Sitting  79   04/20/20 1600 121/80 At rest 100 % 70       Mental Status  Neurologic State: Alert  Orientation Level: Oriented X4  Cognition: Follows commands  Perception: Cues to attend to left side of body, Cues to maintain midline in sitting, Cues to maintain midline in standing  Perseveration: No perseveration noted  Safety/Judgement: Fall prevention, Home safety                          Physical Skills Involved:  1. Range of Motion  2. Balance  3. Strength  4. Activity Tolerance  5. Fine Motor Control  6. Gross Motor Control Cognitive Skills Affected (resulting in the inability to perform in a timely and safe manner):  1. Perception  2. Expression Psychosocial Skills Affected:  1. Habits/Routines  2. Environmental Adaptation  3. Social Interaction  4. Emotional Regulation  5. Self-Awareness  6. Awareness of Others  7. Social Roles   Number of elements that affect the Plan of Care: 5+:  HIGH COMPLEXITY   CLINICAL DECISION MAKIN[address] [place] [address] AM-PAC 6 Clicks   Daily Activity Inpatient Short Form  How much help from another person does the patient currently need. .. Total A Lot A Little None   1. Putting on and taking off regular lower body clothing? [] 1   [x] 2   [] 3   [] 4   2. Bathing (including washing, rinsing, drying)? [] 1   [] 2   [x] 3   [] 4   3. Toileting, which includes using toilet, bedpan or urinal?   [] 1   [] 2   [x] 3   [] 4   4. Putting on and taking off regular upper body clothing? [] 1   [] 2   [x] 3   [] 4   5. Taking care of personal grooming such as brushing teeth? [] 1   [] 2   [x] 3   [] 4   6. Eating meals? [] 1   [] 2   [x] 3   [] 4   © , Trustees of [address] [place] [address], under license to [employer]. All rights reserved      Score:  Initial: 11 Most Recent: 17 (20)    Interpretation of Tool:  Represents activities that are increasingly more difficult (i.e. Bed mobility, Transfers, Gait). Medical Necessity:     · Patient demonstrates   · good and excellent  ·  rehab potential due to higher previous functional level. Reason for Services/Other Comments:  · Patient continues to require skilled intervention due to   · Decreased independence with ADL/functional transfers that impacts overall quality of life.    · .   Use of outcome tool(s) and clinical judgement create a POC that gives a: MODERATE COMPLEXITY         TREATMENT:   (In addition to Assessment/Re-Assessment sessions the following treatments were rendered)     Pre-treatment Symptoms/Complaints: \"I can do some exercises. \" Pt reply to working with therapy. Pain: Initial:   Pain Intensity 1: 0 Post Session: Unchanged     Therapeutic Exercise: (15 minutes):  Exercises per grid below to improve mobility, strength, balance and coordinationRequired moderate visual, verbal, manual and tactile cues to promote proper body alignment, promote proper body posture and promote proper body mechanics. Progressed repetitions and complexity of movement as indicated. Date:  4/15/20 Date:  4/16/20 Date:  4/20/20   Activity/Exercise Parameters Parameters Parameters   Shoulder flexion SROM/AAROM 15 reps with L UE supported at the elbow by R UE (not past 90 degrees) 15 reps with L UE supported at the elbow by R UE (not past 90 degrees) 20 reps with L UE supported at the elbow by R UE (not past 90 degrees)   Elbow flexion/extension SROM/AAROM 15 reps  15 reps  20 reps   Forearm supination/pronation  12 reps additional time 15 reps    Wrist extension  To ~15-20 degrees for 2 sets x 10 reps  15 reps 20 reps   Finger flexion/extension  AAROM for extension with end range stretch x 10 reps  Educated on SROM for finger extension stretch/finger flexion stretch  10 reps    Washcloth slides   Forward reach x 15 reps with mod facilitation; shoulder horizontal abd/add x 15 reps  Forward reach x 15 reps with mod facilitation; shoulder horizontal abd/add x 15 reps with mod facilitation           Neuromuscular Re-education: (0 minutes):  Exercise/activities per grid below to improve balance, coordination and posture. Required moderate visual, verbal, manual and tactile cues to promote dynamic balance in standing and promote motor control of left, upper extremity(s).            Date:  4/7/20 Date:  4/14/20 Date:  4/15/20 Date:   4/16/20   Activity/Exercise Parameters Parameters Parameters    Midline orientation sit to stand  Moderate cues and facilitation to decrease rotation at trunk and for midline trunk position Moderate facilitation at the L LE and for decreased rotation at the trunk     Midline sit to squat   Min to mod A w/ hands in his lap      Weightbearing into L side standing at sink  Minimal facilitation at the L UE; ~10 reps       Forward reach with cane 10 reps with facilitation at the elbow/scapula  10 reps with moderate facilitation at the elbow/scapula 15 reps with moderate facilitation at the elbow/scapula into protraction  15 reps with moderate facilitation at the elbow/scapula into protraction   External rotation with cane 10 reps with minimal facilitation  10 reps with minimal facilitation  15 reps with SBA/CGA  15 reps with SBA/CGA   Posture  Upright posture with thoracic extension and B hands place in the lap  Upright sitting/standing with verbal/visual cueing  Pt encouraged for upright posture with moderate cues throughout session  Pt encouraged for upright posture with moderate cues throughout session    Weightbearing into elbow   10 reps with moderate assistance pushing up into midline  2 sets with prolonged weight bearing and reaching to the L of midline for 2 sets x 15 reps  Sitting at the table with pt encouraged for propped elbows and weightbearing through the L with moderate cues    Weightbearing into hand  Mod to maximal assistance with facilitation at the elbow; 2 sets x 10 reps      Elbow flexion  10 reps AAROM; 10 reps holding ball in B hands      Grasp release of washcloth   10 reps with moderate to maximal facilitation for reaching   4-5 reps with additional time    Trunk Rotation    15 reps with minima facilitation  15 reps with minimal facilitation and additional time           Side Scooting    Minimal facilitation and assistance for positioning and weightbearing of L Hand through his L knee and forward forward flexion at the trunk       Braces/Orthotics/Lines/Etc:   · O2 device: Room air  Treatment/Session Assessment:    Response to Treatment:  Pt showing retention of exercises learned in prior sessions. · Interdisciplinary Collaboration:   o Physical Therapy Assistant  o Occupational Therapist  o Registered Nurse  · After treatment position/precautions:   o Bed/Chair-wheels locked  o Bed in low position  o RN notified  o In wheelchair with mobility team adjacent   · Compliance with Program/Exercises: Compliant all of the time, Will assess as treatment progresses. · Recommendations/Intent for next treatment session: \"Next visit will focus on advancements to more challenging activities and reduction in assistance provided\".   Total Treatment Duration:   OT Patient Time In/Time Out  Time In: 1515  Time Out: 1535     Mary Fairchild

## 2020-04-20 NOTE — DIABETES MGMT
Patient admitted with acute embolic stroke. Blood glucose ranged 111-177 yesterday with patient receiving Metformin 1000mg and Humalog 2 units. Blood glucose this morning was 99. Reviewed patient current regimen: Metformin 500mg BID and Humalog SSI. Blood glucose levels stable on current regimen. Attempted meter instruction with patient to assess patient ability to return demonstrate. Patient currently in bed, resting quietly, eyes closed. Did not disturb. Will continue to follow along loosely.

## 2020-04-20 NOTE — PROGRESS NOTES
04/20/20 0716   NIH Stroke Scale   Interval Other (comment)   LOC 0   LOC Questions 0   LOC Commands 0   Best Gaze 0   Visual 0   Facial Palsy 1   Motor Right Arm 0   Motor Left Arm 2   Motor Right Leg 0   Motor Left Leg 2   Limb Ataxia 0   Sensory 0   Best Language 0   Dysarthria 1   Extinction and Inattention 0   Total 6

## 2020-04-20 NOTE — PROGRESS NOTES
Follow-up visit to convey care and concern. Mr. Anoop Myers was awake and sitting in the chair. He seemed to be in good spirits and anticipating a visit outside.      Dontrell Almanza 66  Board Certified

## 2020-04-21 LAB
ALBUMIN SERPL-MCNC: 2.7 G/DL (ref 3.5–5)
ALBUMIN/GLOB SERPL: 0.7 {RATIO} (ref 1.2–3.5)
ALP SERPL-CCNC: 67 U/L (ref 50–136)
ALT SERPL-CCNC: 18 U/L (ref 12–65)
ANION GAP SERPL CALC-SCNC: 8 MMOL/L (ref 7–16)
AST SERPL-CCNC: 15 U/L (ref 15–37)
BASOPHILS # BLD: 0 K/UL (ref 0–0.2)
BASOPHILS NFR BLD: 1 % (ref 0–2)
BILIRUB SERPL-MCNC: 0.5 MG/DL (ref 0.2–1.1)
BUN SERPL-MCNC: 20 MG/DL (ref 6–23)
CALCIUM SERPL-MCNC: 9.3 MG/DL (ref 8.3–10.4)
CHLORIDE SERPL-SCNC: 109 MMOL/L (ref 98–107)
CO2 SERPL-SCNC: 25 MMOL/L (ref 21–32)
CREAT SERPL-MCNC: 1.01 MG/DL (ref 0.8–1.5)
DIFFERENTIAL METHOD BLD: ABNORMAL
EOSINOPHIL # BLD: 0.1 K/UL (ref 0–0.8)
EOSINOPHIL NFR BLD: 2 % (ref 0.5–7.8)
ERYTHROCYTE [DISTWIDTH] IN BLOOD BY AUTOMATED COUNT: 16.4 % (ref 11.9–14.6)
GLOBULIN SER CALC-MCNC: 3.9 G/DL (ref 2.3–3.5)
GLUCOSE BLD STRIP.AUTO-MCNC: 118 MG/DL (ref 65–100)
GLUCOSE BLD STRIP.AUTO-MCNC: 125 MG/DL (ref 65–100)
GLUCOSE BLD STRIP.AUTO-MCNC: 126 MG/DL (ref 65–100)
GLUCOSE SERPL-MCNC: 87 MG/DL (ref 65–100)
HCT VFR BLD AUTO: 33.2 % (ref 41.1–50.3)
HGB BLD-MCNC: 10.4 G/DL (ref 13.6–17.2)
IMM GRANULOCYTES # BLD AUTO: 0 K/UL (ref 0–0.5)
IMM GRANULOCYTES NFR BLD AUTO: 0 % (ref 0–5)
LYMPHOCYTES # BLD: 2.5 K/UL (ref 0.5–4.6)
LYMPHOCYTES NFR BLD: 49 % (ref 13–44)
MAGNESIUM SERPL-MCNC: 1.6 MG/DL (ref 1.8–2.4)
MCH RBC QN AUTO: 26.4 PG (ref 26.1–32.9)
MCHC RBC AUTO-ENTMCNC: 31.3 G/DL (ref 31.4–35)
MCV RBC AUTO: 84.3 FL (ref 79.6–97.8)
MONOCYTES # BLD: 0.3 K/UL (ref 0.1–1.3)
MONOCYTES NFR BLD: 7 % (ref 4–12)
NEUTS SEG # BLD: 2.2 K/UL (ref 1.7–8.2)
NEUTS SEG NFR BLD: 42 % (ref 43–78)
NRBC # BLD: 0 K/UL (ref 0–0.2)
PLATELET # BLD AUTO: 170 K/UL (ref 150–450)
PMV BLD AUTO: 12 FL (ref 9.4–12.3)
POTASSIUM SERPL-SCNC: 4 MMOL/L (ref 3.5–5.1)
PROT SERPL-MCNC: 6.6 G/DL (ref 6.3–8.2)
RBC # BLD AUTO: 3.94 M/UL (ref 4.23–5.6)
SODIUM SERPL-SCNC: 142 MMOL/L (ref 136–145)
WBC # BLD AUTO: 5.1 K/UL (ref 4.3–11.1)

## 2020-04-21 PROCEDURE — 74011250637 HC RX REV CODE- 250/637: Performed by: HOSPITALIST

## 2020-04-21 PROCEDURE — 74011250637 HC RX REV CODE- 250/637: Performed by: INTERNAL MEDICINE

## 2020-04-21 PROCEDURE — 83735 ASSAY OF MAGNESIUM: CPT

## 2020-04-21 PROCEDURE — 74011250636 HC RX REV CODE- 250/636: Performed by: INTERNAL MEDICINE

## 2020-04-21 PROCEDURE — 74011250636 HC RX REV CODE- 250/636: Performed by: NURSE PRACTITIONER

## 2020-04-21 PROCEDURE — 36415 COLL VENOUS BLD VENIPUNCTURE: CPT

## 2020-04-21 PROCEDURE — 97530 THERAPEUTIC ACTIVITIES: CPT

## 2020-04-21 PROCEDURE — 85025 COMPLETE CBC W/AUTO DIFF WBC: CPT

## 2020-04-21 PROCEDURE — 74011250637 HC RX REV CODE- 250/637: Performed by: NURSE PRACTITIONER

## 2020-04-21 PROCEDURE — 80053 COMPREHEN METABOLIC PANEL: CPT

## 2020-04-21 PROCEDURE — 65270000029 HC RM PRIVATE

## 2020-04-21 PROCEDURE — 82962 GLUCOSE BLOOD TEST: CPT

## 2020-04-21 RX ORDER — MAGNESIUM SULFATE HEPTAHYDRATE 40 MG/ML
2 INJECTION, SOLUTION INTRAVENOUS ONCE
Status: COMPLETED | OUTPATIENT
Start: 2020-04-21 | End: 2020-04-21

## 2020-04-21 RX ADMIN — MAGNESIUM SULFATE HEPTAHYDRATE 2 G: 40 INJECTION, SOLUTION INTRAVENOUS at 08:12

## 2020-04-21 RX ADMIN — METFORMIN HYDROCHLORIDE 500 MG: 500 TABLET ORAL at 17:38

## 2020-04-21 RX ADMIN — ASPIRIN 81 MG 81 MG: 81 TABLET ORAL at 08:11

## 2020-04-21 RX ADMIN — ATORVASTATIN CALCIUM 80 MG: 80 TABLET, FILM COATED ORAL at 21:05

## 2020-04-21 RX ADMIN — ACETAMINOPHEN 650 MG: 325 TABLET, FILM COATED ORAL at 05:13

## 2020-04-21 RX ADMIN — METFORMIN HYDROCHLORIDE 500 MG: 500 TABLET ORAL at 08:11

## 2020-04-21 RX ADMIN — ACETAMINOPHEN 650 MG: 325 TABLET, FILM COATED ORAL at 21:05

## 2020-04-21 RX ADMIN — DIPHENHYDRAMINE HYDROCHLORIDE 25 MG: 25 CAPSULE ORAL at 17:38

## 2020-04-21 RX ADMIN — GUAIFENESIN 100 MG: 100 SOLUTION ORAL at 17:38

## 2020-04-21 RX ADMIN — METOPROLOL SUCCINATE 25 MG: 25 TABLET, FILM COATED, EXTENDED RELEASE ORAL at 08:13

## 2020-04-21 RX ADMIN — LISINOPRIL 40 MG: 20 TABLET ORAL at 08:11

## 2020-04-21 RX ADMIN — Medication 400 MG: at 08:13

## 2020-04-21 RX ADMIN — ENOXAPARIN SODIUM 40 MG: 40 INJECTION SUBCUTANEOUS at 14:54

## 2020-04-21 RX ADMIN — ACETAMINOPHEN 650 MG: 325 TABLET, FILM COATED ORAL at 14:54

## 2020-04-21 NOTE — DIABETES MGMT
Patient admitted with acute embolic stroke. Blood glucose ranged  yesterday with patient receiving Metformin 1000mg. Blood glucose this morning was 87. Reviewed patient current regimen: Metformin 500mg BID and Humalog SSI. Creatinine 1.01. GFR >60. Blood glucose levels stable on current regimen and diet. If blood glucose levels remain stable would recommend decreasing FSBS checks. Attempted to see patient for glucometer instruction to assess patient ability to check FSBS. Patient refused. Will continue to follow along loosely.

## 2020-04-21 NOTE — PROGRESS NOTES
Problem: Patient Education: Go to Patient Education Activity  Goal: Patient/Family Education  Outcome: Progressing Towards Goal     Problem: Pressure Injury - Risk of  Goal: *Prevention of pressure injury  Description: Document Dewey Scale and appropriate interventions in the flowsheet. Outcome: Progressing Towards Goal  Note: Pressure Injury Interventions:  Sensory Interventions: Assess changes in LOC    Moisture Interventions: Absorbent underpads, Check for incontinence Q2 hours and as needed    Activity Interventions: Increase time out of bed    Mobility Interventions: Pressure redistribution bed/mattress (bed type)    Nutrition Interventions: Document food/fluid/supplement intake, Offer support with meals,snacks and hydration    Friction and Shear Interventions: HOB 30 degrees or less                Problem: Patient Education: Go to Patient Education Activity  Goal: Patient/Family Education  Outcome: Progressing Towards Goal     Problem: Falls - Risk of  Goal: *Absence of Falls  Description: Document Jeanne Fall Risk and appropriate interventions in the flowsheet. Outcome: Progressing Towards Goal  Note: Fall Risk Interventions:  Mobility Interventions: Bed/chair exit alarm    Mentation Interventions: Bed/chair exit alarm    Medication Interventions: Bed/chair exit alarm    Elimination Interventions: Call light in reach    History of Falls Interventions: Bed/chair exit alarm, Door open when patient unattended         Problem: Patient Education: Go to Patient Education Activity  Goal: Patient/Family Education  Outcome: Progressing Towards Goal     Problem: Diabetes Self-Management  Goal: *Disease process and treatment process  Description: Define diabetes and identify own type of diabetes; list 3 options for treating diabetes.   Outcome: Progressing Towards Goal  Goal: *Incorporating nutritional management into lifestyle  Description: Describe effect of type, amount and timing of food on blood glucose; list 3 methods for planning meals. Outcome: Progressing Towards Goal  Goal: *Incorporating physical activity into lifestyle  Description: State effect of exercise on blood glucose levels. Outcome: Progressing Towards Goal  Goal: *Developing strategies to promote health/change behavior  Description: Define the ABC's of diabetes; identify appropriate screenings, schedule and personal plan for screenings. Outcome: Progressing Towards Goal  Goal: *Using medications safely  Description: State effect of diabetes medications on diabetes; name diabetes medication taking, action and side effects. Outcome: Progressing Towards Goal  Goal: *Monitoring blood glucose, interpreting and using results  Description: Identify recommended blood glucose targets  and personal targets. Outcome: Progressing Towards Goal  Goal: *Prevention, detection, treatment of acute complications  Description: List symptoms of hyper- and hypoglycemia; describe how to treat low blood sugar and actions for lowering  high blood glucose level. Outcome: Progressing Towards Goal  Goal: *Prevention, detection and treatment of chronic complications  Description: Define the natural course of diabetes and describe the relationship of blood glucose levels to long term complications of diabetes.   Outcome: Progressing Towards Goal  Goal: *Developing strategies to address psychosocial issues  Description: Describe feelings about living with diabetes; identify support needed and support network  Outcome: Progressing Towards Goal     Problem: Patient Education: Go to Patient Education Activity  Goal: Patient/Family Education  Outcome: Progressing Towards Goal     Problem: Patient Education: Go to Patient Education Activity  Goal: Patient/Family Education  Outcome: Progressing Towards Goal     Problem: Nutrition Deficit  Goal: *Optimize nutritional status  Outcome: Progressing Towards Goal     Problem: Patient Education: Go to Patient Education Activity  Goal: Patient/Family Education  Outcome: Progressing Towards Goal     Problem: General Medical Care Plan  Goal: *Vital signs within specified parameters  Outcome: Progressing Towards Goal  Goal: *Labs within defined limits  Outcome: Progressing Towards Goal  Goal: *Absence of infection signs and symptoms  Description: Wash hand more often   Outcome: Progressing Towards Goal  Goal: *Optimal pain control at patient's stated goal  Outcome: Progressing Towards Goal  Goal: *Skin integrity maintained  Outcome: Progressing Towards Goal  Goal: *Fluid volume balance  Outcome: Progressing Towards Goal  Goal: *Optimize nutritional status  Outcome: Progressing Towards Goal  Goal: *Anxiety reduced or absent  Outcome: Progressing Towards Goal  Goal: *Progressive mobility and function (eg: ADL's)  Outcome: Progressing Towards Goal     Problem: Patient Education: Go to Patient Education Activity  Goal: Patient/Family Education  Outcome: Progressing Towards Goal     Problem: Patient Education: Go to Patient Education Activity  Goal: Patient/Family Education  Outcome: Progressing Towards Goal     Problem: Patient Education: Go to Patient Education Activity  Goal: Patient/Family Education  Outcome: Progressing Towards Goal     Problem: Patient Education: Go to Patient Education Activity  Goal: Patient/Family Education  Outcome: Progressing Towards Goal     Problem: Pain  Goal: *Control of Pain  Outcome: Progressing Towards Goal     Problem: Patient Education: Go to Patient Education Activity  Goal: Patient/Family Education  Outcome: Progressing Towards Goal     Problem: Patient Education: Go to Patient Education Activity  Goal: Patient/Family Education  Outcome: Progressing Towards Goal

## 2020-04-21 NOTE — PROGRESS NOTES
Progress Note    2020  Admit Date: 2019  9:07 AM   NAME: Jenine Oppenheim   :  1975   MRN:  [id number]   Attending: Sheri Werner MD  PCP:  Rashawn Ferguson MD  Treatment Team: Attending Provider: Sheri Werner MD; Care Manager: Valiant Essex, LMSW; Utilization Review: Miroslava Wood RN; Nurse Practitioner: Joey Calle NP; Care Manager: Lenka Lagunas RN; Charge Nurse: Renay Colorado; Physical Therapy Assistant: Mayuri Hernandez PTA; Nurse Practitioner: Michelle Ramsey NP    Full Code   SUBJECTIVE:     Mr. Osmar Davey is a 54 yo male with PMH of DM, HTN who presented with c/o left sided weakness and left facial droop 19.   CT head showed age indeterminate infarctions within the left corona radiata/caudate.  CTA head/neck showed stenosis at the distal segment of the right vertebral artery and multifocal stenosis in the P2 segment of the left posterior cerebral artery. MRI brain showed acute to early subacute infarcts in the left cerebellar hemisphere, in the periventricular deep left frontoparietal white matter and likely additional areas of restricted diffusion in the right paramedian yariel.   Echo +PFO.  On statin, ASA.  Has been difficult to place in STR. Plans for 4 week event monitor on DC and if no arrhythmia PFO closure recommended. Pt remains stable today. Awaiting placement, medicaid pending.  Denies complaints.       Past Medical History:   Diagnosis Date    Acute ischemic stroke ([address] 2019    Acute pancreatitis 2014    Cerebrovascular accident (CVA) due to embolism ([address] 2019    Diabetes ([address] 2002    Diabetes ([address]     Diabetes mellitus     Hypertension      Recent Results (from the past 24 hour(s))   GLUCOSE, POC    Collection Time: 20  4:12 PM   Result Value Ref Range    Glucose (POC) 124 (H) 65 - 100 mg/dL   GLUCOSE, POC    Collection Time: 20 10:02 PM   Result Value Ref Range    Glucose (POC) 85 65 - 100 mg/dL   CBC WITH AUTOMATED DIFF    Collection Time: 04/21/20  4:19 AM   Result Value Ref Range    WBC 5.1 4.3 - 11.1 K/uL    RBC 3.94 (L) 4.23 - 5.6 M/uL    HGB 10.4 (L) 13.6 - 17.2 g/dL    HCT 33.2 (L) 41.1 - 50.3 %    MCV 84.3 79.6 - 97.8 FL    MCH 26.4 26.1 - 32.9 PG    MCHC 31.3 (L) 31.4 - 35.0 g/dL    RDW 16.4 (H) 11.9 - 14.6 %    PLATELET 225 989 - 703 K/uL    MPV 12.0 9.4 - 12.3 FL    ABSOLUTE NRBC 0.00 0.0 - 0.2 K/uL    DF AUTOMATED      NEUTROPHILS 42 (L) 43 - 78 %    LYMPHOCYTES 49 (H) 13 - 44 %    MONOCYTES 7 4.0 - 12.0 %    EOSINOPHILS 2 0.5 - 7.8 %    BASOPHILS 1 0.0 - 2.0 %    IMMATURE GRANULOCYTES 0 0.0 - 5.0 %    ABS. NEUTROPHILS 2.2 1.7 - 8.2 K/UL    ABS. LYMPHOCYTES 2.5 0.5 - 4.6 K/UL    ABS. MONOCYTES 0.3 0.1 - 1.3 K/UL    ABS. EOSINOPHILS 0.1 0.0 - 0.8 K/UL    ABS. BASOPHILS 0.0 0.0 - 0.2 K/UL    ABS. IMM. GRANS. 0.0 0.0 - 0.5 K/UL   METABOLIC PANEL, COMPREHENSIVE    Collection Time: 04/21/20  4:19 AM   Result Value Ref Range    Sodium 142 136 - 145 mmol/L    Potassium 4.0 3.5 - 5.1 mmol/L    Chloride 109 (H) 98 - 107 mmol/L    CO2 25 21 - 32 mmol/L    Anion gap 8 7 - 16 mmol/L    Glucose 87 65 - 100 mg/dL    BUN 20 6 - 23 MG/DL    Creatinine 1.01 0.8 - 1.5 MG/DL    GFR est AA >60 >60 ml/min/1.73m2    GFR est non-AA >60 >60 ml/min/1.73m2    Calcium 9.3 8.3 - 10.4 MG/DL    Bilirubin, total 0.5 0.2 - 1.1 MG/DL    ALT (SGPT) 18 12 - 65 U/L    AST (SGOT) 15 15 - 37 U/L    Alk.  phosphatase 67 50 - 136 U/L    Protein, total 6.6 6.3 - 8.2 g/dL    Albumin 2.7 (L) 3.5 - 5.0 g/dL    Globulin 3.9 (H) 2.3 - 3.5 g/dL    A-G Ratio 0.7 (L) 1.2 - 3.5     MAGNESIUM    Collection Time: 04/21/20  4:19 AM   Result Value Ref Range    Magnesium 1.6 (L) 1.8 - 2.4 mg/dL   GLUCOSE, POC    Collection Time: 04/21/20 11:35 AM   Result Value Ref Range    Glucose (POC) 118 (H) 65 - 100 mg/dL     No Known Allergies  Current Facility-Administered Medications   Medication Dose Route Frequency Provider Last Rate Last Dose    metFORMIN (GLUCOPHAGE) tablet 500 mg  500 mg Oral BID WITH MEALS Seferinomauricio De La Rosa NP   500 mg at 04/21/20 3106    insulin lispro (HUMALOG) injection 0-10 Units  0-10 Units SubCUTAneous AC&HS Shamar Gonsalez NP   Stopped at 04/19/20 2200    magnesium oxide (MAG-OX) tablet 400 mg  400 mg Oral DAILY Teodoro Valera MD   400 mg at 04/21/20 0813    acetaminophen (TYLENOL) tablet 650 mg  650 mg Oral Q4H PRN Art Camilo MD   650 mg at 04/10/20 1818    diphenhydrAMINE (BENADRYL) capsule 25 mg  25 mg Oral Q6H PRN Ovi Gale MD   25 mg at 04/20/20 1717    acetaminophen (TYLENOL) tablet 650 mg  650 mg Oral Nova Morris MD   650 mg at 04/21/20 0513    lip protectant (BLISTEX) ointment 1 Each  1 Each Topical PRN Mandy Moore MD        metoprolol succinate (TOPROL-XL) XL tablet 25 mg  25 mg Oral DAILY Art Camilo MD   25 mg at 04/21/20 0813    polyethylene glycol (MIRALAX) packet 17 g  17 g Oral DAILY PRN Eliu Graves C DO   17 g at 02/23/20 1708    guaiFENesin (ROBITUSSIN) 100 mg/5 mL oral liquid 100 mg  100 mg Oral Q4H PRN Art Camilo MD   100 mg at 04/20/20 1717    hydrALAZINE (APRESOLINE) 20 mg/mL injection 20 mg  20 mg IntraVENous Q4H PRN Artie Sow MD   20 mg at 04/17/20 0510    atorvastatin (LIPITOR) tablet 80 mg  80 mg Oral QHS Artie Sow MD   80 mg at 04/20/20 2203    lisinopril (PRINIVIL, ZESTRIL) tablet 40 mg  40 mg Oral DAILY Artie Sow MD   40 mg at 04/21/20 9755    sodium chloride (NS) flush 5-40 mL  5-40 mL IntraVENous PRN Artie Sow MD   10 mL at 04/14/20 2210    ondansetron (ZOFRAN) injection 4 mg  4 mg IntraVENous Q4H PRN Artie Sow MD        aspirin chewable tablet 81 mg  81 mg Oral DAILY Artie Sow MD   81 mg at 04/21/20 5495    enoxaparin (LOVENOX) injection 40 mg  40 mg SubCUTAneous Q24H Artie Sow MD   40 mg at 04/20/20 1503    dextrose 40% (GLUTOSE) oral gel 1 Tube  15 g Oral PRZEKE Britton MD        glucagon [facility]) injection 1 mg  1 mg IntraMUSCular PRN Melo Britton MD        dextrose (D50W) injection syrg 12.5-25 g  25-50 mL IntraVENous PRZEKE Britton MD           Review of Systems negative with exception of pertinent positives noted above  PHYSICAL EXAM     Visit Vitals  /83 (BP 1 Location: Right arm, BP Patient Position: At rest)   Pulse 75   Temp 97.7 °F (36.5 °C)   Resp 18   Ht 5' 6\" (1.676 m)   Wt 79.8 kg (175 lb 14.4 oz)   SpO2 96%   BMI 28.39 kg/m²      Temp (24hrs), Av.1 °F (36.7 °C), Min:97.7 °F (36.5 °C), Max:98.9 °F (37.2 °C)    Oxygen Therapy  O2 Sat (%): 96 % (20 1200)  Pulse via Oximetry: 75 beats per minute (20 0400)  O2 Device: Room air (20 1500)  No intake or output data in the 24 hours ending 20 1331     General:       No acute distress    Lungs:          CTA bilaterally. Resp even and nonlabored  Heart:            S1S2 present without murmurs rubs gallops. RRR. No LE edema  Abdomen:    Soft, non tender, non distended. BS present  Extremities: No cyanosis. Left sided hemiparesis  Neurologic:  moves right hand and raises arm at elbow moderately, unable to squeeze my fingers left hand, mildly slurred speech.  PERRLA, strength 4/5 RUE/RLE. Results summary of Diagnostic Studies/Procedures copied from within [facility] EMR:         Edmar Dixon 96 Problems    Diagnosis Date Noted    Hypomagnesemia 2020    PFO (patent foramen ovale) 2019    Arrhythmia 12/15/2019    Hyperlipemia     [facility]     Type 2 diabetes mellitus ([facility]     Hypertension      Plan:  Acute embolic stroke  MRI brain showed acute to early subacute infarcts in the left cerebellar hemisphere, in the periventricular deep left frontoparietal white matter and likely additional areas of restricted diffusion in the right paramedian yariel.    +PFO on echo  On ASA, statin  Will need 4 week event monitor on DC, if no arrhythmia then will need PFO closure      Hypomagnesemia  Replace IV today and on oral supplementation.      Hypertension  Continue metoprolol and ACE inhibitor  Goal BP <130/80     Type 2 diabetes mellitus:    Monitor, BGL controlled   A1C 6.9      Medically stable for DC.  Awaiting Medicaid approval.         Notes, labs, VS, diagnostic testing reviewed  Time spent with pt 20 min           DVT Prophylaxis: lovenox        Plan of Care Discussed with: Supervising MD  Dr. Ashli Livingston, care team, pt      Tyrese Barragan NP         Pt updates wife daily. Does not need me to call per pt.

## 2020-04-21 NOTE — PROGRESS NOTES
Problem: Mobility Impaired (Adult and Pediatric)  Goal: *Acute Goals and Plan of Care  Description  GOALS UPDATED ON RE-ASSESSMENT 4/19/2020 (ALL GOALS STILL APPLY) :  1. Patient will perform bed mobility with supervision and 0 verbal cues within 7 treatment days. 2. Patient will perform transfer bed to chair with SUPERVISION within 7 treatment days. 3. Patient will demonstrate fair+ dynamic balance throughout stance phase on L LE within 7 treatment days. 4. Patient will require STAND BY ASSIST throughout swing phase on (to advance) L LE within 7 treatment days. 5. Patient demonstrate 3+/5 strength in L LE hip flexion, hip abduction, and hip adduction within 7 treatment days. 6. Patient will ambulate 200ft+ with STAND BY ASSIST and least retrictive assistive device within 7 treatment days. 7. Patient will perform wheelchair mobility 150 ft with modified independence within 7 treatment days. 8. Mr. Antony Murray will be independent with wheelchair locking/unlocking mechanism as well as leg rest manipulation within 7 days to improve safety and independence. 9. Mr. Antony Murray will don/doff LUE sling for protection of L shoulder during transfers/gait within 7 treatment days. 10) Pt's spouse / caregiver comfortable & safe to assist pt with all functional mobility. 11) pt able to go up & down 12 steps with min A & bilateral rails.         PHYSICAL THERAPY: Daily Note and PM 4/21/2020  INPATIENT: PT Visit Days : 3  [id number] [address] / Plan: [address] [place] [address] Avenue / Product Type: Medicaid /       NAME/AGE/GENDER: Ray Nevarez is a [address] y.o. male   PRIMARY DIAGNOSIS: Acute ischemic stroke ([address] [I63.9]  Cerebrovascular accident (CVA) due to embolism ([address] [G23.1] Acute embolic stroke ([address] Acute embolic stroke ([address]       ICD-10: Treatment Diagnosis:   · Difficulty in walking, Not elsewhere classified (R26.2)  · Hemiplegia and hemiparesis following cerebral infarction affecting left non-dominant side (V39.496)   Precaution/Allergies:  Patient has no known allergies. ASSESSMENT:     Mr. Henriquez is a 53year old admitted R MCA CVA.  Patient was supine upon contact and agreeable to PT. Patient performs supine to sit with supervision and additional time. Patient dons sling on LUE with min assist, additional time, and cues for technique. Patient transfers to standing with CGA-SBA. Patient then ambulates 450' with luke walker nd focus on positioning of L foot during strike (heel first) and stance phase on L to improve external rotation. Patient needed cues periodically throughout gait to improve this and have proper gait sequencing. Wheelchair in tow as patient needed 1 seated rest break during ambulation. Gait mechanics decline as patient fatigues requiring increased cues. Patient returns to EOB and to supine with supervision. Overall good progress towards physical therapy goals. Goals listed above are still appropriate. Will continue efforts as patient is still below functional baseline. This section established at most recent assessment   PROBLEM LIST (Impairments causing functional limitations):  1. Decreased Strength  2. Decreased ADL/Functional Activities  3. Decreased Transfer Abilities  4. Decreased Ambulation Ability/Technique  5. Decreased Balance  6. Increased Pain  7. Decreased Activity Tolerance  8. Increased Fatigue  9. Decreased Flexibility/Joint Mobility   INTERVENTIONS PLANNED: (Benefits and precautions of physical therapy have been discussed with the patient.)  1. Balance Exercise  2. Bed Mobility  3. Family Education  4. Gait Training  5. Home Exercise Program (HEP)  6. Manual Therapy  7. Neuromuscular Re-education/Strengthening  8. Range of Motion (ROM)  9. Therapeutic Activites  10. Therapeutic Exercise/Strengthening  11.  Transfer Training     TREATMENT PLAN: Frequency/Duration: 5 times a week for duration of hospital stay  Rehabilitation Potential For Stated Goals: Good REHAB RECOMMENDATIONS (at time of discharge pending progress):    Placement: It is my opinion, based on this patient's performance to date, that Mr. Papito Ellison may benefit from intensive therapy at an [address] [place] [address] after discharge due to a probable need for close medical supervision by a rehab physician, a probable need for multiple therapy disciplines and potential to make ongoing and sustainable functional improvement that is of practical value. .  Equipment:    TBD pending progress with therapy. HISTORY:   History of Present Injury/Illness (Reason for Referral):  Per H&P: \"Pt is a 52 y/o smoker with DM, HTN, who presented to ER with L leg and arm weakness, L facial droop, dysarthria. First noted L leg weakness late night 12/11 when he woke up to go to the bathroom. He was normal when he went to bed around 1030. Woke up this morning and had persistent weakness L leg and also now noted in L arm. EMS called. Noted to have slurred speech and L facial droop as well. Code S called in ER around 9am.  MRI with acute infarcts in L cerebellar hemisphere, deep frontoparietal white matter, and R paramedian yariel. Also noted old lacunar infarcts. No large vessel occlusion on CTA, but some stenosis noted. No hx afib, TIA, CVA. No CP, palpitations, SOB. \"  Past Medical History/Comorbidities:   Mr. Papito Ellison  has a past medical history of Acute ischemic stroke ([address] (12/12/2019), Acute pancreatitis (11/19/2014), Cerebrovascular accident (CVA) due to embolism ([address] (12/12/2019), Diabetes ([address] (2002), Diabetes ([address] Diabetes mellitus, and Hypertension.  He also has no past medical history of Arthritis, Asthma, Autoimmune disease ([address] CAD (coronary artery disease), Cancer ([address] Chronic kidney disease, COPD, Dementia, Dementia ([address] Heart failure ([address] Ill-defined condition, Infectious disease, Liver disease, Other ill-defined conditions(799.89), Psychiatric disorder, PUD (peptic ulcer disease), Seizures ([facility] or Sleep disorder. Mr. Joanne Beach  has a past surgical history that includes hx hernia repair and hx orthopaedic. Social History/Living Environment:   Home Environment: Apartment  # Steps to Enter: 12  Rails to Enter: Yes  [employer] : Bilateral  One/Two Story Residence: One story  Living Alone: No  Support Systems: Spouse/Significant Other/Partner  Patient Expects to be Discharged to[de-identified] Unknown  Current DME Used/Available at Home: None  Tub or Shower Type: Tub/Shower combination  Prior Level of Function/Work/Activity:  Independent, lives with wife in 2nd story 1 level apartment. No recent falls. Number of Personal Factors/Comorbidities that affect the Plan of Care: 1-2: MODERATE COMPLEXITY   EXAMINATION:   Most Recent Physical Functioning:   Gross Assessment: left hip grossly 2/5 to 3-/5                       Balance:  Sitting: Intact  Standing: Impaired  Standing - Static: Good  Standing - Dynamic : Fair Bed Mobility:  Supine to Sit: Supervision  Wheelchair Mobility: NT     Transfers:  Sit to Stand: Contact guard assistance;Stand-by assistance  Stand to Sit: Stand-by assistance;Contact guard assistance  Gait:     Base of Support: Narrowed; Shift to right  Speed/Clotilde: Delayed; Slow  Step Length: Left shortened  Gait Abnormalities: Hemiplegic  Distance (ft): 450 Feet (ft)(1 seated rest break)  Assistive Device: Walker luke  Ambulation - Level of Assistance: Contact guard assistance  Interventions: Safety awareness training;Verbal cues      Body Structures Involved:  1. Nerves  2. Voice/Speech  3. Bones  4. Joints  5. Muscles Body Functions Affected:  1. Mental  2. Sensory/Pain  3. Neuromusculoskeletal  4. Movement Related Activities and Participation Affected:  1. General Tasks and Demands  2. Mobility  3. Self Care  4.  Interpersonal Interactions and Relationships   Number of elements that affect the Plan of Care: 4+: HIGH COMPLEXITY   CLINICAL PRESENTATION:   Presentation: Evolving clinical presentation with changing clinical characteristics: MODERATE COMPLEXITY   CLINICAL DECISION MAKIN [facility] Mobility Inpatient Short Form  How much difficulty does the patient currently have. .. Unable A Lot A Little None   1. Turning over in bed (including adjusting bedclothes, sheets and blankets)? [] 1   [] 2   [] 3   [x] 4   2. Sitting down on and standing up from a chair with arms ( e.g., wheelchair, bedside commode, etc.)   [] 1   [] 2   [x] 3   [] 4   3. Moving from lying on back to sitting on the side of the bed? [] 1   [] 2   [] 3   [x] 4   How much help from another person does the patient currently need. .. Total A Lot A Little None   4. Moving to and from a bed to a chair (including a wheelchair)? [] 1   [] 2   [x] 3   [] 4   5. Need to walk in hospital room? [] 1   [] 2   [x] 3   [] 4   6. Climbing 3-5 steps with a railing? [] 1   [x] 2   [] 3   [] 4   © , Trustees of [address] [place] [address], under license to [employer]. All rights reserved      Score:  Initial: 13 Most Recent: 19 (Date: 4/10/2020)    Interpretation of Tool:  Represents activities that are increasingly more difficult (i.e. Bed mobility, Transfers, Gait). Medical Necessity:     · Patient is expected to demonstrate progress in   · strength, range of motion, balance, coordination, and functional technique  ·  to   · increase independence with all mobility. · .  Reason for Services/Other Comments:  · Patient continues to require skilled intervention due to   · medical complications and mobility deficits which impact his level of function, safety, and independence as indicated above.    · .   Use of outcome tool(s) and clinical judgement create a POC that gives a: Questionable prediction of patient's progress: MODERATE COMPLEXITY        TREATMENT:      Pre-treatment Symptoms/Complaints: see above  Pain: Initial: 0/10 Post Session:  0/10     Therapeutic Activity: (    27 Minutes) : Therapeutic activities including bed mobility training, transfer training from various surface heights, ambulation on level ground with cues for gait mechanics, positioning of LLE during gait, wheel chair mobility/mechanics, and patient education  to improve mobility, strength and balance. Required moderate Safety awareness training;Verbal cues to promote static and dynamic balance in standing and promote coordination of bilateral, lower extremity(s). Braces/Orthotics/Lines/Etc:   · Sling to LUE  Treatment/Session Assessment:    · Response to Treatment:  See above  · Interdisciplinary Collaboration:   o Physical Therapy Assistant  o Registered Nurse  · After treatment position/precautions:   o Supine in bed  o Bed/Chair-wheels locked  o Bed in low position  o Call light within reach  o RN notified   · Compliance with Program/Exercises: Compliant all of the time  · Recommendations/Intent for next treatment session: \"Next visit will focus on advancements to more challenging activities and reduction in assistance provided\".     Total Treatment Duration:  PT Patient Time In/Time Out  Time In: 8403  Time Out: 5727 [facility]

## 2020-04-22 LAB — GLUCOSE BLD STRIP.AUTO-MCNC: 117 MG/DL (ref 65–100)

## 2020-04-22 PROCEDURE — 97530 THERAPEUTIC ACTIVITIES: CPT

## 2020-04-22 PROCEDURE — 74011250637 HC RX REV CODE- 250/637: Performed by: NURSE PRACTITIONER

## 2020-04-22 PROCEDURE — 74011250637 HC RX REV CODE- 250/637: Performed by: INTERNAL MEDICINE

## 2020-04-22 PROCEDURE — 74011250637 HC RX REV CODE- 250/637: Performed by: HOSPITALIST

## 2020-04-22 PROCEDURE — 97110 THERAPEUTIC EXERCISES: CPT

## 2020-04-22 PROCEDURE — 74011250636 HC RX REV CODE- 250/636: Performed by: INTERNAL MEDICINE

## 2020-04-22 PROCEDURE — 82962 GLUCOSE BLOOD TEST: CPT

## 2020-04-22 PROCEDURE — 65270000029 HC RM PRIVATE

## 2020-04-22 RX ADMIN — ACETAMINOPHEN 650 MG: 325 TABLET, FILM COATED ORAL at 16:10

## 2020-04-22 RX ADMIN — ACETAMINOPHEN 650 MG: 325 TABLET, FILM COATED ORAL at 21:11

## 2020-04-22 RX ADMIN — LISINOPRIL 40 MG: 20 TABLET ORAL at 08:23

## 2020-04-22 RX ADMIN — Medication 400 MG: at 08:23

## 2020-04-22 RX ADMIN — ATORVASTATIN CALCIUM 80 MG: 80 TABLET, FILM COATED ORAL at 21:11

## 2020-04-22 RX ADMIN — METOPROLOL SUCCINATE 25 MG: 25 TABLET, FILM COATED, EXTENDED RELEASE ORAL at 08:23

## 2020-04-22 RX ADMIN — ASPIRIN 81 MG 81 MG: 81 TABLET ORAL at 08:23

## 2020-04-22 RX ADMIN — ACETAMINOPHEN 650 MG: 325 TABLET, FILM COATED ORAL at 05:17

## 2020-04-22 RX ADMIN — METFORMIN HYDROCHLORIDE 500 MG: 500 TABLET ORAL at 17:15

## 2020-04-22 RX ADMIN — METFORMIN HYDROCHLORIDE 500 MG: 500 TABLET ORAL at 08:23

## 2020-04-22 RX ADMIN — DIPHENHYDRAMINE HYDROCHLORIDE 25 MG: 25 CAPSULE ORAL at 17:15

## 2020-04-22 RX ADMIN — ENOXAPARIN SODIUM 40 MG: 40 INJECTION SUBCUTANEOUS at 16:10

## 2020-04-22 RX ADMIN — GUAIFENESIN 100 MG: 100 SOLUTION ORAL at 17:15

## 2020-04-22 NOTE — PROGRESS NOTES
Problem: Patient Education: Go to Patient Education Activity  Goal: Patient/Family Education  Outcome: Progressing Towards Goal     Problem: Pressure Injury - Risk of  Goal: *Prevention of pressure injury  Description: Document Dewey Scale and appropriate interventions in the flowsheet. Outcome: Progressing Towards Goal  Note: Pressure Injury Interventions:  Sensory Interventions: Assess changes in LOC    Moisture Interventions: Absorbent underpads    Activity Interventions: Increase time out of bed    Mobility Interventions: HOB 30 degrees or less    Nutrition Interventions: Document food/fluid/supplement intake    Friction and Shear Interventions: HOB 30 degrees or less                Problem: Patient Education: Go to Patient Education Activity  Goal: Patient/Family Education  Outcome: Progressing Towards Goal     Problem: Falls - Risk of  Goal: *Absence of Falls  Description: Document Jeanne Fall Risk and appropriate interventions in the flowsheet. Outcome: Progressing Towards Goal  Note: Fall Risk Interventions:  Mobility Interventions: Bed/chair exit alarm    Mentation Interventions: Bed/chair exit alarm    Medication Interventions: Patient to call before getting OOB    Elimination Interventions: Bed/chair exit alarm    History of Falls Interventions: Bed/chair exit alarm         Problem: Patient Education: Go to Patient Education Activity  Goal: Patient/Family Education  Outcome: Progressing Towards Goal     Problem: Diabetes Self-Management  Goal: *Disease process and treatment process  Description: Define diabetes and identify own type of diabetes; list 3 options for treating diabetes. Outcome: Progressing Towards Goal  Goal: *Incorporating nutritional management into lifestyle  Description: Describe effect of type, amount and timing of food on blood glucose; list 3 methods for planning meals.   Outcome: Progressing Towards Goal  Goal: *Incorporating physical activity into lifestyle  Description: [facility] effect of exercise on blood glucose levels. Outcome: Progressing Towards Goal  Goal: *Developing strategies to promote health/change behavior  Description: Define the ABC's of diabetes; identify appropriate screenings, schedule and personal plan for screenings. Outcome: Progressing Towards Goal  Goal: *Using medications safely  Description: State effect of diabetes medications on diabetes; name diabetes medication taking, action and side effects. Outcome: Progressing Towards Goal  Goal: *Monitoring blood glucose, interpreting and using results  Description: Identify recommended blood glucose targets  and personal targets. Outcome: Progressing Towards Goal  Goal: *Prevention, detection, treatment of acute complications  Description: List symptoms of hyper- and hypoglycemia; describe how to treat low blood sugar and actions for lowering  high blood glucose level. Outcome: Progressing Towards Goal  Goal: *Prevention, detection and treatment of chronic complications  Description: Define the natural course of diabetes and describe the relationship of blood glucose levels to long term complications of diabetes.   Outcome: Progressing Towards Goal  Goal: *Developing strategies to address psychosocial issues  Description: Describe feelings about living with diabetes; identify support needed and support network  Outcome: Progressing Towards Goal     Problem: Patient Education: Go to Patient Education Activity  Goal: Patient/Family Education  Outcome: Progressing Towards Goal     Problem: Nutrition Deficit  Goal: *Optimize nutritional status  Outcome: Progressing Towards Goal     Problem: General Medical Care Plan  Goal: *Vital signs within specified parameters  Outcome: Progressing Towards Goal  Goal: *Labs within defined limits  Outcome: Progressing Towards Goal  Goal: *Absence of infection signs and symptoms  Description: Wash hand more often   Outcome: Progressing Towards Goal  Goal: *Optimal pain control at patient's stated goal  Outcome: Progressing Towards Goal  Goal: *Skin integrity maintained  Outcome: Progressing Towards Goal  Goal: *Fluid volume balance  Outcome: Progressing Towards Goal  Goal: *Optimize nutritional status  Outcome: Progressing Towards Goal  Goal: *Anxiety reduced or absent  Outcome: Progressing Towards Goal  Goal: *Progressive mobility and function (eg: ADL's)  Outcome: Progressing Towards Goal     Problem: Patient Education: Go to Patient Education Activity  Goal: Patient/Family Education  Outcome: Progressing Towards Goal     Problem: Patient Education: Go to Patient Education Activity  Goal: Patient/Family Education  Outcome: Progressing Towards Goal     Problem: Patient Education: Go to Patient Education Activity  Goal: Patient/Family Education  Outcome: Progressing Towards Goal     Problem: Patient Education: Go to Patient Education Activity  Goal: Patient/Family Education  Outcome: Progressing Towards Goal     Problem: Ischemic Stroke: Discharge Outcomes  Goal: *Verbalizes anxiety and depression are reduced or absent  Outcome: Progressing Towards Goal  Goal: *Verbalize understanding of risk factor modification(Stroke Metric)  Outcome: Progressing Towards Goal  Goal: *Hemodynamically stable  Outcome: Progressing Towards Goal  Goal: *Absence of aspiration pneumonia  Outcome: Progressing Towards Goal  Goal: *Aware of needed dietary changes  Outcome: Progressing Towards Goal  Goal: *Verbalize understanding of prescribed medications including anti-coagulants, anti-lipid, and/or anti-platelets(Stroke Metric)  Outcome: Progressing Towards Goal  Goal: *Tolerating diet  Outcome: Progressing Towards Goal  Goal: *Aware of follow-up diagnostics related to anticoagulants  Outcome: Progressing Towards Goal  Goal: *Ability to perform ADLs and demonstrates progressive mobility and function  Outcome: Progressing Towards Goal  Goal: *Absence of DVT(Stroke Metric)  Outcome: Progressing Towards Goal  Goal: *Absence of aspiration  Outcome: Progressing Towards Goal  Goal: *Optimal pain control at patient's stated goal  Outcome: Progressing Towards Goal  Goal: *Home safety concerns addressed  Outcome: Progressing Towards Goal  Goal: *Describes available resources and support systems  Outcome: Progressing Towards Goal  Goal: *Verbalizes understanding of activation of EMS(911) for stroke symptoms(Stroke Metric)  Outcome: Progressing Towards Goal  Goal: *Understands and describes signs and symptoms to report to providers(Stroke Metric)  Outcome: Progressing Towards Goal  Goal: *Neurolgocially stable (absence of additional neurological deficits)  Outcome: Progressing Towards Goal  Goal: *Verbalizes importance of follow-up with primary care physician(Stroke Metric)  Outcome: Progressing Towards Goal  Goal: *Smoking cessation discussed,if applicable(Stroke Metric)  Outcome: Progressing Towards Goal  Goal: *Depression screening completed(Stroke Metric)  Outcome: Progressing Towards Goal     Problem: Pain  Goal: *Control of Pain  Outcome: Progressing Towards Goal     Problem: Patient Education: Go to Patient Education Activity  Goal: Patient/Family Education  Outcome: Progressing Towards Goal

## 2020-04-22 NOTE — PROGRESS NOTES
Problem: Self Care Deficits Care Plan (Adult)  Goal: *Acute Goals and Plan of Care (Insert Text)  Description  Goals per Re-evaluation on 3/18/2020:   1. Patient will demonstrate appropriate safety awareness and protection of L UE during bed mobility and functional transfers with minimal cues. (Progressing, still needs cues, 4/14/20)  2. Patient will complete total body bathing and dressing with Min A and adaptive equipment as needed. (Progressing 4/14/20  3. Patient will complete weightbearing into the L UE with ADL tasks with minimal assistance to improve ability to use as a functional assist during ADL tasks. (Progressing 4/14/20)4. Patient will demonstrate L UE SROM HEP within 7 days. (Progressing, 3/18/2020)  5. Pt will complete bed mobility with Mod I and minimal verbal cueing (Progressing, Supervision, 4/14/20). 6. Pt will complete dynamic sitting balance for ADLs at mod I with good balance (Progressing, 4/14/20  7. Pt will complete functional transfers with Supervision with equipment as needed. (Progressing, SBA to CGA 4/14/20)  8. Pt will complete grooming tasks in standing at sink level x5 mins with good balance and equipment as needed MET  9. Pt will complete grooming standing at sink level with SBA and use of adaptive equipment as needed. MET  10. Pt will don/doff UE sling with SBA and use of minimal verbal and visual cueing for correct application (Progressing, Min A 4/14/20. Goals per Re-evaluation on 3/27/2020:   1. Patient will demonstrate appropriate safety awareness and protection of L UE during bed mobility and functional transfers with no verbal cues. CONTINUE   2. Patient will complete lower body bathing and dressing with Min A and adaptive equipment as needed. CONTINUE  3. Patient will complete upper body bathing and dressing with SBA and adaptive equipment as needed.  CONTINUE  4. Patient will complete weightbearing into the L UE with ADL tasks with minimal assistance to improve ability to use as a functional assist during ADL tasks. CONTINUE  5. Patient will demonstrate L UE SROM HEP within 7 days. CONTINUE  6. Pt will complete bed mobility with Mod I and no verbal cueing. CONTINUE  7. Pt will complete functional transfers with Supervision with equipment as needed. CONTINUE  8. Pt will don/doff UE sling with Mod I and no verbal cueing. CONTINUE  9. Pt will complete toileting with SBA for toilet transfer and gown management. CONTINUE  10. Patient will participate in using L UE as a functional assist for ADL for 15 minutes with minimal facilitation. NEW GOAL 4/14/20  11. Patient will complete sit to stand at midline with minimal assistance and cueing. NEW GOAL 4/14/20  12. Patient will complete therapeutic exercises with L UE with minimal tactile cues for facilitation of the LUE. NeW GOAL 4/14/20    Outcome: Progressing Towards Goal     OCCUPATIONAL THERAPY: Daily Note and PM    4/22/2020  INPATIENT: OT Visit Days: 6  Payor: [id number] / Plan: [place] / Product Type: Medicaid /      NAME/AGE/GENDER: Gifty Johnson is a 53 y.o. male   PRIMARY DIAGNOSIS:  Acute ischemic stroke ([address] [I63.9]  Cerebrovascular accident (CVA) due to embolism ([address] [J25.3] Acute embolic stroke ([address] Acute embolic stroke ([address]       ICD-10: Treatment Diagnosis:    · Generalized Muscle Weakness (M62.81)  · Other lack of cordination (R27.8)  · Hemiplegia and hemiparesis following cerebral infarction affecting   · left non-dominant side (I69.354)  · Abnormal posture (R29.3)   Precautions/Allergies:    NO PULLING ON LUE   LUE in sling with shoulder joint approximated and supported, needs taping   Patient has no known allergies. ASSESSMENT:     Mr. Papito Ellison presents to the hospital with L sided weakness and acute ischemic CVA. MRI revealed acute to subacute L cerebellar and R paramedian yariel infarcts.      4/22/2020: Pt supine in bed upon arrival. Pt alert and agreeable to be seen by therapy. Pt requires SBA for supine to sit. Seated edge of bed, pt with intact static and dynamic seated balance. Pt requires CGA/SBA with use keanu walker for sit to stand. Pt with good static and fair dynamic standing balance. Pt requires CGA with use of keanu walker to walk from bed to recliner chair and requires CGA/SBA with use of keanu walker for stand to sit. Pt completed PROM exercises in L UE with use of R UE to assist. Pt also performed fine motor exercises to increase ROM, strength, and coordination in digits of L hand. Pt used R UE for L wrist stabilization to grasp small objects with L hand and required intermittent assist to extend digits in L hand for object release. Specific exercises performed listed in grid below. Pt good participant during exercises and motivated to complete. Pt left seated upright in recliner with Ambulation Team Members in room. All needs met and within reach at this time. RN notified. Will continue POC. This section established at most recent assessment   PROBLEM LIST (Impairments causing functional limitations):  1. Decreased Strength  2. Decreased ADL/Functional Activities  3. Decreased Transfer Abilities  4. Decreased Ambulation Ability/Technique  5. Decreased Balance  6. Decreased Activity Tolerance  7. Increased Fatigue  8. Decreased Flexibility/Joint Mobility  9. Decreased Knowledge of Precautions  10. Decreased [place] with Home Exercise Program   INTERVENTIONS PLANNED: (Benefits and precautions of occupational therapy have been discussed with the patient.)  1. Activities of daily living training  2. Adaptive equipment training  3. Balance training  4. Clothing management  5. Cognitive training  6. Donning&doffing training  7. Keanu tech training  8. Neuromuscular re-eduation  9. Therapeutic activity  10. Therapeutic exercise     TREATMENT PLAN: Frequency/Duration: Follow patient 3 times per week to address above goals.   Rehabilitation Potential For Stated Goals: Excellent     REHAB RECOMMENDATIONS (at time of discharge pending progress):    Placement: It is my opinion, based on this patient's performance to date, that Mr. Antony Murray may benefit from intensive therapy at an [address] [place] [address] after discharge due to potential to make ongoing and sustainable functional improvement that is of practical value. [place] [facility] Pt functioning far below independent baseline, demonstrating good improvement and participation. Pt would likely benefit greatly and increase independence from inpatient rehab stay. Equipment:    TBD               OCCUPATIONAL PROFILE AND HISTORY:   History of Present Injury/Illness (Reason for Referral):  See H&P  Past Medical History/Comorbidities:   Mr. Antony Murray  has a past medical history of Acute ischemic stroke ([address] (12/12/2019), Acute pancreatitis (11/19/2014), Cerebrovascular accident (CVA) due to embolism ([address] (12/12/2019), Diabetes ([address] (2002), Diabetes ([address] Diabetes mellitus, and Hypertension. He also has no past medical history of Arthritis, Asthma, Autoimmune disease ([address] CAD (coronary artery disease), Cancer ([address] Chronic kidney disease, COPD, Dementia, Dementia ([address] Heart failure ([address] Ill-defined condition, Infectious disease, Liver disease, Other ill-defined conditions(799.89), Psychiatric disorder, PUD (peptic ulcer disease), Seizures ([address] or Sleep disorder. Mr. Antony Murray  has a past surgical history that includes hx hernia repair and hx orthopaedic. Social History/Living Environment:   Home Environment: Apartment  # Steps to Enter: 12  Rails to Enter: Yes  [employer] : Bilateral  One/Two Story Residence: One story  Living Alone: No  Support Systems: Spouse/Significant Other/Partner  Patient Expects to be Discharged to[de-identified] Unknown  Current DME Used/Available at Home: None  Tub or Shower Type: Tub/Shower combination  Prior Level of Function/Work/Activity:  Pt lives at home with his wife.  Pt is typically independent with ADL/functional mobility. Pt does not drive. Pt was working part-time at [employer]. Personal Factors:          Age:  53 y.o. Past/Current Experience:  CVA with flaccid L side        Other factors that influence how disability is experienced by the patient:  multiple co-morbidities    Number of Personal Factors/Comorbidities that affect the Plan of Care: Extensive review of physical, cognitive, and psychosocial performance (3+):  HIGH COMPLEXITY   ASSESSMENT OF OCCUPATIONAL PERFORMANCE[de-identified]   Activities of Daily Living:   Basic ADLs (From Assessment) Complex ADLs (From Assessment)   Feeding: Setup  Oral Facial Hygiene/Grooming: Minimum assistance  Bathing: Minimum assistance, Moderate assistance  Upper Body Dressing: Minimum assistance  Lower Body Dressing: Moderate assistance  Toileting: Minimum assistance Instrumental ADL  Meal Preparation: Total assistance  Homemaking: Total assistance  Medication Management: Total assistance  Financial Management: Total assistance   Grooming/Bathing/Dressing Activities of Daily Living         Upper Body Bathing  Bathing Assistance: Min A   Position Performed: Seated in chair                  Lower Body Dressing Assistance  Socks:  Total assistance (dependent) Bed/Mat Mobility  Supine to Sit: Stand-by assistance  Sit to Stand: Contact guard assistance;Stand-by assistance  Stand to Sit: Contact guard assistance;Stand-by assistance  Bed to Chair: Contact guard assistance  Scooting: Contact guard assistance     Most Recent Physical Functioning:   Gross Assessment:                  Posture:     Balance:  Sitting: Intact  Standing: Impaired  Standing - Static: Good  Standing - Dynamic : Fair Bed Mobility:  Supine to Sit: Stand-by assistance  Scooting: Contact guard assistance  Wheelchair Mobility:     Transfers:  Sit to Stand: Contact guard assistance;Stand-by assistance  Stand to Sit: Contact guard assistance;Stand-by assistance  Bed to Chair: Contact guard assistance Patient Vitals for the past 6 hrs:   BP BP Patient Position SpO2 Pulse   20 1200 143/89 At rest 98 % 73       Mental Status  Neurologic State: Alert  Orientation Level: Oriented X4  Cognition: Follows commands  Perception: Cues to attend to left side of body, Cues to maintain midline in sitting, Cues to maintain midline in standing  Perseveration: No perseveration noted  Safety/Judgement: Fall prevention, Home safety                          Physical Skills Involved:  1. Range of Motion  2. Balance  3. Strength  4. Activity Tolerance  5. Fine Motor Control  6. Gross Motor Control Cognitive Skills Affected (resulting in the inability to perform in a timely and safe manner):  1. Perception  2. Expression Psychosocial Skills Affected:  1. Habits/Routines  2. Environmental Adaptation  3. Social Interaction  4. Emotional Regulation  5. Self-Awareness  6. Awareness of Others  7. Social Roles   Number of elements that affect the Plan of Care: 5+:  HIGH COMPLEXITY   CLINICAL DECISION MAKIN[address] [place] [id number] AM-PAC 6 Clicks   Daily Activity Inpatient Short Form  How much help from another person does the patient currently need. .. Total A Lot A Little None   1. Putting on and taking off regular lower body clothing? [] 1   [x] 2   [] 3   [] 4   2. Bathing (including washing, rinsing, drying)? [] 1   [] 2   [x] 3   [] 4   3. Toileting, which includes using toilet, bedpan or urinal?   [] 1   [] 2   [x] 3   [] 4   4. Putting on and taking off regular upper body clothing? [] 1   [] 2   [x] 3   [] 4   5. Taking care of personal grooming such as brushing teeth? [] 1   [] 2   [x] 3   [] 4   6. Eating meals? [] 1   [] 2   [x] 3   [] 4   © , Trustees of [address] [place] [id number], under license to [facility].  All rights reserved      Score:  Initial: 11 Most Recent: 17 (20)    Interpretation of Tool:  Represents activities that are increasingly more difficult (i.e. Bed mobility, Transfers, Gait).    Medical Necessity:     · Patient demonstrates   · good and excellent  ·  rehab potential due to higher previous functional level. Reason for Services/Other Comments:  · Patient continues to require skilled intervention due to   · Decreased independence with ADL/functional transfers that impacts overall quality of life. · .   Use of outcome tool(s) and clinical judgement create a POC that gives a: MODERATE COMPLEXITY         TREATMENT:   (In addition to Assessment/Re-Assessment sessions the following treatments were rendered)     Pre-treatment Symptoms/Complaints: \"I'm trying my best.\" Pt reply when completing upper extremity exercises. Pain: Initial:   Pain Intensity 1: 8/10  Pain Location: Dorsal surface of hand Post Session: Unchanged     Therapeutic Exercise: (30 minutes):  Exercises per grid below to improve mobility, strength, balance and coordinationRequired moderate visual, verbal, manual and tactile cues to promote proper body alignment, promote proper body posture and promote proper body mechanics. Progressed repetitions and complexity of movement as indicated.      Date:  4/15/20 Date:  4/16/20 Date:  4/20/20 Date:   4/22/2020   Activity/Exercise Parameters Parameters Parameters Parameters   Shoulder flexion SROM/AAROM 15 reps with L UE supported at the elbow by R UE (not past 90 degrees) 15 reps with L UE supported at the elbow by R UE (not past 90 degrees) 20 reps with L UE supported at the elbow by R UE (not past 90 degrees)    Elbow flexion/extension SROM/AAROM 15 reps  15 reps  20 reps    Forearm supination/pronation  12 reps additional time 15 reps     Wrist extension  To ~15-20 degrees for 2 sets x 10 reps  15 reps 20 reps    Finger flexion/extension  AAROM for extension with end range stretch x 10 reps  Educated on SROM for finger extension stretch/finger flexion stretch  10 reps     Washcloth slides   Forward reach x 15 reps with mod facilitation; shoulder horizontal abd/add x 15 reps  Forward reach x 15 reps with mod facilitation; shoulder horizontal abd/add x 15 reps with mod facilitation    Forward Reaching with soccer ball    20 reps (using two handed technique with R hand over left; therapist supporting L elbow)   Shoulder Horizontal Abduction & Adduction with soccer ball    20 reps each way (using two handed technique with R hand over left; therapist supporting L elbow)   Shoulder Flexion & Crossing Midline with cones    14 reps (pt stabilized L wrist; therapist supporting L elbow)   Digit Flexion & Extension with pegs    24 reps (using two handed technique with assist needed for object release)     Neuromuscular Re-education: (0 minutes):  Exercise/activities per grid below to improve balance, coordination and posture. Required moderate visual, verbal, manual and tactile cues to promote dynamic balance in standing and promote motor control of left, upper extremity(s).            Date:  4/7/20 Date:  4/14/20  Date:  4/15/20 Date:   4/16/20   Activity/Exercise Parameters Parameters Parameters    Midline orientation sit to stand  Moderate cues and facilitation to decrease rotation at trunk and for midline trunk position Moderate facilitation at the L LE and for decreased rotation at the trunk     Midline sit to squat   Min to mod A w/ hands in his lap      Weightbearing into L side standing at sink  Minimal facilitation at the L UE; ~10 reps       Forward reach with cane 10 reps with facilitation at the elbow/scapula  10 reps with moderate facilitation at the elbow/scapula 15 reps with moderate facilitation at the elbow/scapula into protraction  15 reps with moderate facilitation at the elbow/scapula into protraction   External rotation with cane 10 reps with minimal facilitation  10 reps with minimal facilitation  15 reps with SBA/CGA  15 reps with SBA/CGA   Posture  Upright posture with thoracic extension and B hands place in the lap  Upright sitting/standing with verbal/visual cueing Pt encouraged for upright posture with moderate cues throughout session  Pt encouraged for upright posture with moderate cues throughout session    Weightbearing into elbow   10 reps with moderate assistance pushing up into midline  2 sets with prolonged weight bearing and reaching to the L of midline for 2 sets x 15 reps  Sitting at the table with pt encouraged for propped elbows and weightbearing through the L with moderate cues    Weightbearing into hand  Mod to maximal assistance with facilitation at the elbow; 2 sets x 10 reps      Elbow flexion  10 reps AAROM; 10 reps holding ball in B hands      Grasp release of washcloth   10 reps with moderate to maximal facilitation for reaching   4-5 reps with additional time    Trunk Rotation    15 reps with minima facilitation  15 reps with minimal facilitation and additional time           Side Scooting    Minimal facilitation and assistance for positioning and weightbearing of L Hand through his L knee and forward forward flexion at the trunk       Braces/Orthotics/Lines/Etc:   · O2 device: Room air  Treatment/Session Assessment:    Response to Treatment:  Pt good participant with new exercises. · Interdisciplinary Collaboration:   o Occupational Therapist  o Registered Nurse  · After treatment position/precautions:   o Up in chair  o Bed/Chair-wheels locked  o Bed in low position  o Call light within reach  o RN notified  o Ambulation Team in room   · Compliance with Program/Exercises: Compliant all of the time, Will assess as treatment progresses. · Recommendations/Intent for next treatment session: \"Next visit will focus on advancements to more challenging activities and reduction in assistance provided\".   Total Treatment Duration:   OT Patient Time In/Time Out  Time In: 1455  Time Out: 1525     Mary Fairchild

## 2020-04-22 NOTE — PROGRESS NOTES
Problem: Mobility Impaired (Adult and Pediatric)  Goal: *Acute Goals and Plan of Care  Description  GOALS UPDATED ON RE-ASSESSMENT 4/19/2020 (ALL GOALS STILL APPLY) :  1. Patient will perform bed mobility with supervision and 0 verbal cues within 7 treatment days. 2. Patient will perform transfer bed to chair with SUPERVISION within 7 treatment days. 3. Patient will demonstrate fair+ dynamic balance throughout stance phase on L LE within 7 treatment days. 4. Patient will require STAND BY ASSIST throughout swing phase on (to advance) L LE within 7 treatment days. 5. Patient demonstrate 3+/5 strength in L LE hip flexion, hip abduction, and hip adduction within 7 treatment days. 6. Patient will ambulate 200ft+ with STAND BY ASSIST and least retrictive assistive device within 7 treatment days. 7. Patient will perform wheelchair mobility 150 ft with modified independence within 7 treatment days. 8. Mr. Jackie Anderson will be independent with wheelchair locking/unlocking mechanism as well as leg rest manipulation within 7 days to improve safety and independence. 9. Mr. Jackie Anderson will don/doff LUE sling for protection of L shoulder during transfers/gait within 7 treatment days. 10) Pt's spouse / caregiver comfortable & safe to assist pt with all functional mobility. 11) pt able to go up & down 12 steps with min A & bilateral rails.         PHYSICAL THERAPY: Daily Note and PM 4/22/2020  INPATIENT: PT Visit Days : 4  Payor: [id number] / Plan: [address] [place] [address] / Product Type: Medicaid /       NAME/AGE/GENDER: Priti Kelly is a 53 y.o. male   PRIMARY DIAGNOSIS: Acute ischemic stroke ([address] [I63.9]  Cerebrovascular accident (CVA) due to embolism ([address] [W11.2] Acute embolic stroke ([address] Acute embolic stroke ([address]       ICD-10: Treatment Diagnosis:   · Difficulty in walking, Not elsewhere classified (R26.2)  · Hemiplegia and hemiparesis following cerebral infarction affecting left non-dominant side (M90.055)   Precaution/Allergies:  Patient has no known allergies. ASSESSMENT:     Mr. Henriquez is a 53year old admitted R MCA CVA.  Patient was supine upon contact and agreeable to PT. Patient performs supine to sit with supervision and additional time. Patient dons sling on LUE with min assist, additional time, and cues for technique. Patient transfers to standing with CGA-SBA. Patient then ambulates 450' with luke walker nd focus on positioning of L foot during strike (heel first), knee flexion during swing phase of gait on L, and stance phase on L to improve foot placement and reduce external rotation. Patient needed cues periodically throughout gait to improve this and have proper gait sequencing. Wheelchair in tow as patient needed 1 seated rest break during ambulation. Gait mechanics decline as patient fatigues requiring increased cues. Patient returns to EOB and to supine with supervision. Overall good progress towards physical therapy goals. Goals listed above are still appropriate. Will continue efforts as patient is still below functional baseline. This section established at most recent assessment   PROBLEM LIST (Impairments causing functional limitations):  1. Decreased Strength  2. Decreased ADL/Functional Activities  3. Decreased Transfer Abilities  4. Decreased Ambulation Ability/Technique  5. Decreased Balance  6. Increased Pain  7. Decreased Activity Tolerance  8. Increased Fatigue  9. Decreased Flexibility/Joint Mobility   INTERVENTIONS PLANNED: (Benefits and precautions of physical therapy have been discussed with the patient.)  1. Balance Exercise  2. Bed Mobility  3. Family Education  4. Gait Training  5. Home Exercise Program (HEP)  6. Manual Therapy  7. Neuromuscular Re-education/Strengthening  8. Range of Motion (ROM)  9. Therapeutic Activites  10. Therapeutic Exercise/Strengthening  11.  Transfer Training     TREATMENT PLAN: Frequency/Duration: 5 times a week for duration of hospital stay  Rehabilitation Potential For Stated Goals: Good     REHAB RECOMMENDATIONS (at time of discharge pending progress):    Placement: It is my opinion, based on this patient's performance to date, that Mr. Brianna Mcgarry may benefit from intensive therapy at an [address] [place] [address] after discharge due to a probable need for close medical supervision by a rehab physician, a probable need for multiple therapy disciplines and potential to make ongoing and sustainable functional improvement that is of practical value. .  Equipment:    TBD pending progress with therapy. HISTORY:   History of Present Injury/Illness (Reason for Referral):  Per H&P: \"Pt is a 54 y/o smoker with DM, HTN, who presented to ER with L leg and arm weakness, L facial droop, dysarthria. First noted L leg weakness late night 12/11 when he woke up to go to the bathroom. He was normal when he went to bed around 1030. Woke up this morning and had persistent weakness L leg and also now noted in L arm. EMS called. Noted to have slurred speech and L facial droop as well. Code S called in ER around 9am.  MRI with acute infarcts in L cerebellar hemisphere, deep frontoparietal white matter, and R paramedian yariel. Also noted old lacunar infarcts. No large vessel occlusion on CTA, but some stenosis noted. No hx afib, TIA, CVA. No CP, palpitations, SOB. \"  Past Medical History/Comorbidities:   Mr. Brianna Mcgarry  has a past medical history of Acute ischemic stroke ([address] (12/12/2019), Acute pancreatitis (11/19/2014), Cerebrovascular accident (CVA) due to embolism ([address] (12/12/2019), Diabetes ([address] (2002), Diabetes ([address] Diabetes mellitus, and Hypertension.  He also has no past medical history of Arthritis, Asthma, Autoimmune disease ([address] CAD (coronary artery disease), Cancer ([address] Chronic kidney disease, COPD, Dementia, Dementia ([address] Heart failure ([address] Ill-defined condition, Infectious disease, Liver disease, Other ill-defined conditions(799.89), Psychiatric disorder, PUD (peptic ulcer disease), Seizures ([facility] or Sleep disorder. Mr. Jackie Anderson  has a past surgical history that includes hx hernia repair and hx orthopaedic. Social History/Living Environment:   Home Environment: Apartment  # Steps to Enter: 12  Rails to Enter: Yes  [employer] : Bilateral  One/Two Story Residence: One story  Living Alone: No  Support Systems: Spouse/Significant Other/Partner  Patient Expects to be Discharged to[de-identified] Unknown  Current DME Used/Available at Home: None  Tub or Shower Type: Tub/Shower combination  Prior Level of Function/Work/Activity:  Independent, lives with wife in 2nd story 1 level apartment. No recent falls. Number of Personal Factors/Comorbidities that affect the Plan of Care: 1-2: MODERATE COMPLEXITY   EXAMINATION:   Most Recent Physical Functioning:   Gross Assessment: left hip grossly 2/5 to 3-/5                       Balance:  Sitting: Intact  Standing: Impaired  Standing - Static: Good  Standing - Dynamic : Fair Bed Mobility:  Supine to Sit: Supervision  Wheelchair Mobility: NT     Transfers:  Sit to Stand: Contact guard assistance;Stand-by assistance  Stand to Sit: Contact guard assistance;Stand-by assistance  Gait:     Base of Support: Narrowed; Shift to right  Speed/Clotilde: Delayed; Slow  Step Length: Left shortened  Gait Abnormalities: Hemiplegic  Distance (ft): 450 Feet (ft)(1 seated rest break)  Assistive Device: Walker luke  Ambulation - Level of Assistance: Contact guard assistance;Stand-by assistance  Interventions: Safety awareness training;Verbal cues      Body Structures Involved:  1. Nerves  2. Voice/Speech  3. Bones  4. Joints  5. Muscles Body Functions Affected:  1. Mental  2. Sensory/Pain  3. Neuromusculoskeletal  4. Movement Related Activities and Participation Affected:  1. General Tasks and Demands  2. Mobility  3. Self Care  4.  Interpersonal Interactions and Relationships   Number of elements that affect the Plan of Care: 4+: HIGH COMPLEXITY   CLINICAL PRESENTATION:   Presentation: Evolving clinical presentation with changing clinical characteristics: MODERATE COMPLEXITY   CLINICAL DECISION MAKIN [facility] Inpatient Short Form  How much difficulty does the patient currently have. .. Unable A Lot A Little None   1. Turning over in bed (including adjusting bedclothes, sheets and blankets)? [] 1   [] 2   [] 3   [x] 4   2. Sitting down on and standing up from a chair with arms ( e.g., wheelchair, bedside commode, etc.)   [] 1   [] 2   [x] 3   [] 4   3. Moving from lying on back to sitting on the side of the bed? [] 1   [] 2   [] 3   [x] 4   How much help from another person does the patient currently need. .. Total A Lot A Little None   4. Moving to and from a bed to a chair (including a wheelchair)? [] 1   [] 2   [x] 3   [] 4   5. Need to walk in hospital room? [] 1   [] 2   [x] 3   [] 4   6. Climbing 3-5 steps with a railing? [] 1   [x] 2   [] 3   [] 4   © , Trustees of [address] [place] [address], under license to [email or web address]. All rights reserved      Score:  Initial: 13 Most Recent: 19 (Date: 4/10/2020)    Interpretation of Tool:  Represents activities that are increasingly more difficult (i.e. Bed mobility, Transfers, Gait). Medical Necessity:     · Patient is expected to demonstrate progress in   · strength, range of motion, balance, coordination, and functional technique  ·  to   · increase independence with all mobility. · .  Reason for Services/Other Comments:  · Patient continues to require skilled intervention due to   · medical complications and mobility deficits which impact his level of function, safety, and independence as indicated above.    · .   Use of outcome tool(s) and clinical judgement create a POC that gives a: Questionable prediction of patient's progress: MODERATE COMPLEXITY        TREATMENT:      Pre-treatment Symptoms/Complaints: see above  Pain: Initial: 0/10 Post Session:  0/10     Therapeutic Activity: (    30 Minutes) : Therapeutic activities including bed mobility training, transfer training from various surface heights, ambulation on level ground with cues for gait mechanics, positioning of LLE during gait, wheel chair mobility/mechanics, and patient education  to improve mobility, strength and balance. Required moderate Safety awareness training;Verbal cues to promote static and dynamic balance in standing and promote coordination of bilateral, lower extremity(s). Braces/Orthotics/Lines/Etc:   · Sling to LUE  Treatment/Session Assessment:    · Response to Treatment:  See above  · Interdisciplinary Collaboration:   o Physical Therapy Assistant  o Registered Nurse  · After treatment position/precautions:   o Supine in bed  o Bed/Chair-wheels locked  o Bed in low position  o Call light within reach  o RN notified   · Compliance with Program/Exercises: Compliant all of the time  · Recommendations/Intent for next treatment session: \"Next visit will focus on advancements to more challenging activities and reduction in assistance provided\".     Total Treatment Duration:  PT Patient Time In/Time Out  Time In: 1310  Time Out: [address] [facility] [facility]

## 2020-04-22 NOTE — PROGRESS NOTES
Problem: Pressure Injury - Risk of  Goal: *Prevention of pressure injury  Description: Document Dewey Scale and appropriate interventions in the flowsheet. Outcome: Progressing Towards Goal  Note: Pressure Injury Interventions:  Sensory Interventions: Assess changes in LOC    Moisture Interventions: Absorbent underpads, Apply protective barrier, creams and emollients, Check for incontinence Q2 hours and as needed, Limit adult briefs    Activity Interventions: Increase time out of bed, Pressure redistribution bed/mattress(bed type), PT/OT evaluation    Mobility Interventions: HOB 30 degrees or less, Pressure redistribution bed/mattress (bed type), PT/OT evaluation    Nutrition Interventions: Document food/fluid/supplement intake    Friction and Shear Interventions: HOB 30 degrees or less                Problem: Patient Education: Go to Patient Education Activity  Goal: Patient/Family Education  Outcome: Progressing Towards Goal     Problem: Falls - Risk of  Goal: *Absence of Falls  Description: Document Jeanne Fall Risk and appropriate interventions in the flowsheet.   Outcome: Progressing Towards Goal  Note: Fall Risk Interventions:  Mobility Interventions: Bed/chair exit alarm, OT consult for ADLs, Patient to call before getting OOB, PT Consult for mobility concerns    Mentation Interventions: Bed/chair exit alarm    Medication Interventions: Bed/chair exit alarm, Evaluate medications/consider consulting pharmacy, Patient to call before getting OOB, Teach patient to arise slowly    Elimination Interventions: Bed/chair exit alarm, Call light in reach, Patient to call for help with toileting needs, Stay With Me (per policy), Urinal in reach    History of Falls Interventions: Bed/chair exit alarm, Door open when patient unattended, Investigate reason for fall         Problem: Patient Education: Go to Patient Education Activity  Goal: Patient/Family Education  Outcome: Progressing Towards Goal     Problem: Diabetes Self-Management  Goal: *Disease process and treatment process  Description: Define diabetes and identify own type of diabetes; list 3 options for treating diabetes. Outcome: Progressing Towards Goal  Goal: *Incorporating nutritional management into lifestyle  Description: Describe effect of type, amount and timing of food on blood glucose; list 3 methods for planning meals. Outcome: Progressing Towards Goal  Goal: *Incorporating physical activity into lifestyle  Description: State effect of exercise on blood glucose levels. Outcome: Progressing Towards Goal  Goal: *Developing strategies to promote health/change behavior  Description: Define the ABC's of diabetes; identify appropriate screenings, schedule and personal plan for screenings. Outcome: Progressing Towards Goal  Goal: *Using medications safely  Description: State effect of diabetes medications on diabetes; name diabetes medication taking, action and side effects. Outcome: Progressing Towards Goal  Goal: *Monitoring blood glucose, interpreting and using results  Description: Identify recommended blood glucose targets  and personal targets. Outcome: Progressing Towards Goal  Goal: *Prevention, detection, treatment of acute complications  Description: List symptoms of hyper- and hypoglycemia; describe how to treat low blood sugar and actions for lowering  high blood glucose level. Outcome: Progressing Towards Goal  Goal: *Prevention, detection and treatment of chronic complications  Description: Define the natural course of diabetes and describe the relationship of blood glucose levels to long term complications of diabetes.   Outcome: Progressing Towards Goal  Goal: *Developing strategies to address psychosocial issues  Description: Describe feelings about living with diabetes; identify support needed and support network  Outcome: Progressing Towards Goal     Problem: Patient Education: Go to Patient Education Activity  Goal: Patient/Family Education  Outcome: Progressing Towards Goal     Problem: General Medical Care Plan  Goal: *Vital signs within specified parameters  Outcome: Progressing Towards Goal  Goal: *Labs within defined limits  Outcome: Progressing Towards Goal  Goal: *Absence of infection signs and symptoms  Description: Wash hand more often   Outcome: Progressing Towards Goal  Goal: *Optimal pain control at patient's stated goal  Outcome: Progressing Towards Goal  Goal: *Skin integrity maintained  Outcome: Progressing Towards Goal  Goal: *Fluid volume balance  Outcome: Progressing Towards Goal  Goal: *Optimize nutritional status  Outcome: Progressing Towards Goal  Goal: *Anxiety reduced or absent  Outcome: Progressing Towards Goal  Goal: *Progressive mobility and function (eg: ADL's)  Outcome: Progressing Towards Goal     Problem: Patient Education: Go to Patient Education Activity  Goal: Patient/Family Education  Outcome: Progressing Towards Goal     Problem: Patient Education: Go to Patient Education Activity  Goal: Patient/Family Education  Outcome: Progressing Towards Goal     Problem: Pain  Goal: *Control of Pain  Outcome: Progressing Towards Goal     Problem: Patient Education: Go to Patient Education Activity  Goal: Patient/Family Education  Outcome: Progressing Towards Goal

## 2020-04-22 NOTE — DIABETES MGMT
Patient admitted with acute embolic stroke. Blood glucose ranged  yesterday with patient receiving Metformin 1000mg. Blood glucose this morning was 117. Reviewed patient current regimen: Humalog SSI and Metformin 500mg BID. Patient blood glucose levels stable on Metformin and current diet. Spoke with provider [facility] Will continue to follow along loosely.

## 2020-04-22 NOTE — PROGRESS NOTES
Hospitalist Progress Note    Subjective:   Daily Progress Note: 4/22/2020 4:38 PM    Patient admitted 12/12/19 with acute right side embolic stroke with left side residual weakness and left facial droop. CT brain on admission with indeterminate infarctions within the left corona radiata/caudate.  CTA of the head and neck with stenosis at the distal segment of the right  Vertebral artery and multifocal stenosis in the P2 segment of the left posterior cerebral artery.  MRI brain with acute to early subacute infarcts in the left cerebellar hemisphere in the periventricular deep left frontoparietal white matter and likely additional areas of restricted diffusion in the right paramedian yariel. Echo with + PFO, on statin and ASA.  Will need event monitor on discharge and if no arrhythmia, PFO closure recommended.  Wife informs CM she does not want patient at home, they were planning to separate prior to this stroke.  CM attempting  to place in STR, medicaid pending.    4/9:  Speech remains slightly slurred, SLP signing off as clinical indication no longer needed.    4/10:  Mag critically low today: 1.3. Replaced   4/14:  Resting quietly in bed.  Continues to wait for placement.  Good participation with PT/OT. Daphne Shook a little use of right hand, still unable to . Transitioned from lantus and humalog SSI to metformin bid in January of this year. SSI ac and HS added 4/15. Teaching done. 4/22:  DM management in, A1C now 6.9. Spoke with DM management. Glucose , this am 117. Can discontinue SSI and qid fingersticks. Appreciate all the assist.  Patient happy to receive less fingersticks. No new findings or complaints.      ADDITIONAL HISTORY:  DM II, HTN, pancreatitis,  stroke, COPD, dementia, CAD, PUD,  Hernia repair.       Current Facility-Administered Medications   Medication Dose Route Frequency    metFORMIN (GLUCOPHAGE) tablet 500 mg  500 mg Oral BID WITH MEALS    magnesium oxide (MAG-OX) tablet 400 mg 400 mg Oral DAILY    acetaminophen (TYLENOL) tablet 650 mg  650 mg Oral Q4H PRN    diphenhydrAMINE (BENADRYL) capsule 25 mg  25 mg Oral Q6H PRN    acetaminophen (TYLENOL) tablet 650 mg  650 mg Oral Q8H    lip protectant (BLISTEX) ointment 1 Each  1 Each Topical PRN    metoprolol succinate (TOPROL-XL) XL tablet 25 mg  25 mg Oral DAILY    polyethylene glycol (MIRALAX) packet 17 g  17 g Oral DAILY PRN    guaiFENesin (ROBITUSSIN) 100 mg/5 mL oral liquid 100 mg  100 mg Oral Q4H PRN    hydrALAZINE (APRESOLINE) 20 mg/mL injection 20 mg  20 mg IntraVENous Q4H PRN    atorvastatin (LIPITOR) tablet 80 mg  80 mg Oral QHS    lisinopril (PRINIVIL, ZESTRIL) tablet 40 mg  40 mg Oral DAILY    sodium chloride (NS) flush 5-40 mL  5-40 mL IntraVENous PRN    ondansetron (ZOFRAN) injection 4 mg  4 mg IntraVENous Q4H PRN    aspirin chewable tablet 81 mg  81 mg Oral DAILY    enoxaparin (LOVENOX) injection 40 mg  40 mg SubCUTAneous Q24H    dextrose 40% (GLUTOSE) oral gel 1 Tube  15 g Oral PRN    glucagon (GLUCAGEN) injection 1 mg  1 mg IntraMUSCular PRN    dextrose (D50W) injection syrg 12.5-25 g  25-50 mL IntraVENous PRN      Review of Systems  A comprehensive review of systems was negative except for that written in the HPI. Objective:     Visit Vitals  /89 (BP 1 Location: Right arm, BP Patient Position: At rest)   Pulse 73   Temp 97.8 °F (36.6 °C)   Resp 18   Ht 5' 6\" (1.676 m)   Wt 79.8 kg (175 lb 14.4 oz)   SpO2 98%   BMI 28.39 kg/m²      O2 Device: Room air    Temp (24hrs), Av.9 °F (36.6 °C), Min:97.7 °F (36.5 °C), Max:98.1 °F (36.7 °C)    General appearance: Oriented and alert, cooperative, denies need or new issues.  In good spirits.    Head: Normocephalic, without obvious abnormality, atraumatic  Eyes: conjunctivae/corneas clear.  PERRL  Throat: Continued mild left facial weakness.  Lips, mucosa, and tongue normal. Teeth and gums normal  Neck: supple, symmetrical, trachea midline, no JVD  Lungs: clear to auscultation bilaterally  Heart: regular rate and rhythm, S1, S2 normal, no murmur, click, rub or gallop  Abdomen: soft, non-tender. Bowel sounds normal. No masses,  no organomegaly  Extremities: Right lower and upper extremities normal, atraumatic, no cyanosis or edema.  Persistent left side residual weakness, both upper and lower extremities on left weak. Skin: Skin color, texture, turgor normal. No rashes or lesions  Neurologic: As above.     Additional comments: Notes,orders, test results, vitals reviewed    Data Review  Recent Results (from the past 24 hour(s))   GLUCOSE, POC    Collection Time: 04/21/20  9:12 PM   Result Value Ref Range    Glucose (POC) 126 (H) 65 - 100 mg/dL   GLUCOSE, POC    Collection Time: 04/22/20  8:03 AM   Result Value Ref Range    Glucose (POC) 117 (H) 65 - 100 mg/dL      12/12/19:  CT HEAD:  Age indeterminate infarctions within the left corona radiata/caudate. Mild chronic small vessel ischemic changes and areas of remote infarction as described. Probable partial volume averaging the region of the left thalamus rather than intraparenchymal hemorrhage.     12/12/19:  CTA HEAD AND NECK:      Stenosis at the distal segment of the right vertebral artery and multifocal  stenosis in the P2 segment of the left posterior cerebral artery.     12/12/19:  MRI BRAIN:  Acute to early subacute infarcts in the left cerebellar hemisphere, in the periventricular deep left frontoparietal white matter and likely additional areas of restricted diffusion in the right paramedian yariel. Old lacunar infarcts in the corpus striatum and periventricular white matter as well as within the left corona radiata.     1/8/20:  SWALLOW FUNCTION VIDEO: Small quantity aspiration with straw sips of thin liquids.     Assessment/Plan:   Acute to early subacute infarcts in the left cerebellar hemisphere, in the periventricular deep left frontoparietal white matter and likely additional areas of restricted diffusion in the right paramedian yariel. Old lacunar infarcts also.                  Making good progress with PT/OT                Speech and swallowing improved to the extent SLP signed off                 Pending placement      Dysarthria due to stroke:  Improved immensely              SLP releasing 4/14 due to improved without need for further  intervention      Difficult placement with marital discord prior to stroke:  Wife does not want him at home:  CM working on tirelessly              Now issues obtaining MR for [facility] application      Hypertension:  Continue lisinopril, toprol XL     IDDM II:  Continue glucophage, diabetic diet. A1C: now 6.9, will discontinue SSI ac and HS.                DM education and management      Arrhythmia:  On beta blocker     PFO 12/17/2019       Hypomagnesemia:  Replace and recheck      Last labs:  4/21/20    Signed By: Yanci Cantrell NP     April 22, 2020

## 2020-04-23 PROCEDURE — 74011250637 HC RX REV CODE- 250/637: Performed by: INTERNAL MEDICINE

## 2020-04-23 PROCEDURE — 74011250637 HC RX REV CODE- 250/637: Performed by: HOSPITALIST

## 2020-04-23 PROCEDURE — 97530 THERAPEUTIC ACTIVITIES: CPT

## 2020-04-23 PROCEDURE — 74011250637 HC RX REV CODE- 250/637: Performed by: NURSE PRACTITIONER

## 2020-04-23 PROCEDURE — 74011250636 HC RX REV CODE- 250/636: Performed by: INTERNAL MEDICINE

## 2020-04-23 PROCEDURE — 65270000029 HC RM PRIVATE

## 2020-04-23 RX ADMIN — METFORMIN HYDROCHLORIDE 500 MG: 500 TABLET ORAL at 17:18

## 2020-04-23 RX ADMIN — METOPROLOL SUCCINATE 25 MG: 25 TABLET, FILM COATED, EXTENDED RELEASE ORAL at 08:22

## 2020-04-23 RX ADMIN — LISINOPRIL 40 MG: 20 TABLET ORAL at 08:21

## 2020-04-23 RX ADMIN — GUAIFENESIN 100 MG: 100 SOLUTION ORAL at 17:18

## 2020-04-23 RX ADMIN — ACETAMINOPHEN 650 MG: 325 TABLET, FILM COATED ORAL at 13:45

## 2020-04-23 RX ADMIN — DIPHENHYDRAMINE HYDROCHLORIDE 25 MG: 25 CAPSULE ORAL at 17:18

## 2020-04-23 RX ADMIN — Medication 400 MG: at 08:21

## 2020-04-23 RX ADMIN — ATORVASTATIN CALCIUM 80 MG: 80 TABLET, FILM COATED ORAL at 21:17

## 2020-04-23 RX ADMIN — ENOXAPARIN SODIUM 40 MG: 40 INJECTION SUBCUTANEOUS at 13:45

## 2020-04-23 RX ADMIN — ACETAMINOPHEN 650 MG: 325 TABLET, FILM COATED ORAL at 21:17

## 2020-04-23 RX ADMIN — ASPIRIN 81 MG 81 MG: 81 TABLET ORAL at 08:22

## 2020-04-23 RX ADMIN — ACETAMINOPHEN 650 MG: 325 TABLET, FILM COATED ORAL at 05:30

## 2020-04-23 RX ADMIN — METFORMIN HYDROCHLORIDE 500 MG: 500 TABLET ORAL at 08:22

## 2020-04-23 NOTE — PROGRESS NOTES
Hospitalist Progress Note    Subjective:   Daily Progress Note: 2020 4:41 PM  Patient admitted 17 with acute right side embolic stroke with left side residual weakness and left facial droop. CT brain on admission with indeterminate infarctions within the left corona radiata/caudate.  CTA of the head and neck with stenosis at the distal segment of the right  Vertebral artery and multifocal stenosis in the P2 segment of the left posterior cerebral artery.  MRI brain with acute to early subacute infarcts in the left cerebellar hemisphere in the periventricular deep left frontoparietal white matter and likely additional areas of restricted diffusion in the right paramedian yariel. Echo with + PFO, on statin and ASA.  Will need event monitor on discharge and if no arrhythmia, PFO closure recommended.  Wife informs CM she does not want patient at home, they were planning to separate prior to this stroke.  CM attempting  to place in STR, medicaid pending.    :  Speech remains slightly slurred, SLP signing off.      4/10:  Ma.3. Replaced   :  Resting quietly in bed.  Continues to wait for placement.  Good participation with PT/OT. Rennis Meter a little use of right hand, still unable to . Transitioned from lantus and humalog SSI to metformin bid in January of this year. SSI ac and HS added 4/15. Teaching done.     :  DM management in, A1C now 6.9. Spoke with DM management. Glucose , this am 117. Discontinuing SSI and qid fingersticks. :  Up a lot with PT yesterday, tired today. No complaints.   Always cheerful.        ADDITIONAL HISTORY:  DM II, HTN, pancreatitis,  stroke, COPD, dementia, CAD, PUD,  Hernia repair.       Current Facility-Administered Medications   Medication Dose Route Frequency    metFORMIN (GLUCOPHAGE) tablet 500 mg  500 mg Oral BID WITH MEALS    magnesium oxide (MAG-OX) tablet 400 mg  400 mg Oral DAILY    acetaminophen (TYLENOL) tablet 650 mg  650 mg Oral Q4H PRN  diphenhydrAMINE (BENADRYL) capsule 25 mg  25 mg Oral Q6H PRN    acetaminophen (TYLENOL) tablet 650 mg  650 mg Oral Q8H    lip protectant (BLISTEX) ointment 1 Each  1 Each Topical PRN    metoprolol succinate (TOPROL-XL) XL tablet 25 mg  25 mg Oral DAILY    polyethylene glycol (MIRALAX) packet 17 g  17 g Oral DAILY PRN    guaiFENesin (ROBITUSSIN) 100 mg/5 mL oral liquid 100 mg  100 mg Oral Q4H PRN    hydrALAZINE (APRESOLINE) 20 mg/mL injection 20 mg  20 mg IntraVENous Q4H PRN    atorvastatin (LIPITOR) tablet 80 mg  80 mg Oral QHS    lisinopril (PRINIVIL, ZESTRIL) tablet 40 mg  40 mg Oral DAILY    sodium chloride (NS) flush 5-40 mL  5-40 mL IntraVENous PRN    ondansetron (ZOFRAN) injection 4 mg  4 mg IntraVENous Q4H PRN    aspirin chewable tablet 81 mg  81 mg Oral DAILY    enoxaparin (LOVENOX) injection 40 mg  40 mg SubCUTAneous Q24H    dextrose 40% (GLUTOSE) oral gel 1 Tube  15 g Oral PRN    glucagon (GLUCAGEN) injection 1 mg  1 mg IntraMUSCular PRN    dextrose (D50W) injection syrg 12.5-25 g  25-50 mL IntraVENous PRN        Review of Systems  A comprehensive review of systems was negative except for that written in the HPI. Objective:     Visit Vitals  /84 (BP 1 Location: Right arm, BP Patient Position: At rest)   Pulse 80   Temp 98.3 °F (36.8 °C)   Resp 18   Ht 5' 6\" (1.676 m)   Wt 79.8 kg (175 lb 14.4 oz)   SpO2 94%   BMI 28.39 kg/m²      O2 Device: Room air    Temp (24hrs), Av °F (36.7 °C), Min:97.6 °F (36.4 °C), Max:98.3 °F (36.8 °C)    701 -  1900  In: 120 [P.O.:120]  Out: -     General appearance: Sitting up in bed, no complaints. In riented and alert, cooperative, denies need or new issues.  In good spirits.    Head: Normocephalic, without obvious abnormality, atraumatic  Eyes: conjunctivae/corneas clear.  PERRL  Throat: Continued mild left facial weakness.  Lips, mucosa, and tongue normal. Teeth and gums normal  Neck: supple, symmetrical, trachea midline, no JVD  Lungs: clear to auscultation bilaterally  Heart: regular rate and rhythm, S1, S2 normal, no murmur, click, rub or gallop  Abdomen: soft, non-tender. Bowel sounds normal. No masses,  no organomegaly  Extremities: Right lower and upper extremities normal, atraumatic, no cyanosis or edema.  Persistent left side residual weakness, both upper and lower extremities on left weak. Skin: Skin color, texture, turgor normal. No rashes or lesions  Neurologic: As above. Additional comments: Notes,orders, test results, vitals reviewed    Data Review   12/12/19:  CT HEAD:  Age indeterminate infarctions within the left corona radiata/caudate. Mild chronic small vessel ischemic changes and areas of remote infarction as described.  Probable partial volume averaging the region of the left thalamus rather than intraparenchymal hemorrhage.     12/12/19:  CTA HEAD AND NECK:      Stenosis at the distal segment of the right vertebral artery and multifocal  stenosis in the P2 segment of the left posterior cerebral artery.     12/12/19:  MRI BRAIN:  Acute to early subacute infarcts in the left cerebellar hemisphere, in the periventricular deep left frontoparietal white matter and likely additional areas of restricted diffusion in the right paramedian yariel. Old lacunar infarcts in the corpus striatum and periventricular white matter as well as within the left corona radiata.     1/8/20:  SWALLOW FUNCTION VIDEO: Small quantity aspiration with straw sips of thin liquids.     LAST LABS:      Ref.  Range 4/21/2020 04:19   RBC Latest Ref Range: 4.23 - 5.6 M/uL 3.94 (L)   HGB Latest Ref Range: 13.6 - 17.2 g/dL 10.4 (L)   HCT Latest Ref Range: 41.1 - 50.3 % 33.2 (L)   MCV Latest Ref Range: 79.6 - 97.8 FL 84.3   MCH Latest Ref Range: 26.1 - 32.9 PG 26.4   MCHC Latest Ref Range: 31.4 - 35.0 g/dL 31.3 (L)   RDW Latest Ref Range: 11.9 - 14.6 % 16.4 (H)   PLATELET Latest Ref Range: 150 - 450 K/uL 170   MPV Latest Ref Range: 9.4 - 12.3 FL 12.0 NEUTROPHILS Latest Ref Range: 43 - 78 % 42 (L)   LYMPHOCYTES Latest Ref Range: 13 - 44 % 49 (H)   MONOCYTES Latest Ref Range: 4.0 - 12.0 % 7   EOSINOPHILS Latest Ref Range: 0.5 - 7.8 % 2   BASOPHILS Latest Ref Range: 0.0 - 2.0 % 1   IMMATURE GRANULOCYTES Latest Ref Range: 0.0 - 5.0 % 0   DF Latest Units:   AUTOMATED   ABSOLUTE NRBC Latest Ref Range: 0.0 - 0.2 K/uL 0.00   ABS. NEUTROPHILS Latest Ref Range: 1.7 - 8.2 K/UL 2.2   ABS. IMM. GRANS. Latest Ref Range: 0.0 - 0.5 K/UL 0.0   ABS. LYMPHOCYTES Latest Ref Range: 0.5 - 4.6 K/UL 2.5   ABS. MONOCYTES Latest Ref Range: 0.1 - 1.3 K/UL 0.3   ABS. EOSINOPHILS Latest Ref Range: 0.0 - 0.8 K/UL 0.1   ABS. BASOPHILS Latest Ref Range: 0.0 - 0.2 K/UL 0.0   Sodium Latest Ref Range: 136 - 145 mmol/L 142   Potassium Latest Ref Range: 3.5 - 5.1 mmol/L 4.0   Chloride Latest Ref Range: 98 - 107 mmol/L 109 (H)   CO2 Latest Ref Range: 21 - 32 mmol/L 25   Anion gap Latest Ref Range: 7 - 16 mmol/L 8   Glucose Latest Ref Range: 65 - 100 mg/dL 87   BUN Latest Ref Range: 6 - 23 MG/DL 20   Creatinine Latest Ref Range: 0.8 - 1.5 MG/DL 1.01   Calcium Latest Ref Range: 8.3 - 10.4 MG/DL 9.3   Magnesium Latest Ref Range: 1.8 - 2.4 mg/dL 1.6 (L)   GFR est non-AA Latest Ref Range: >60 ml/min/1.73m2 >60   GFR est AA Latest Ref Range: >60 ml/min/1.73m2 >60   Bilirubin, total Latest Ref Range: 0.2 - 1.1 MG/DL 0.5   Protein, total Latest Ref Range: 6.3 - 8.2 g/dL 6.6   Albumin Latest Ref Range: 3.5 - 5.0 g/dL 2.7 (L)   Globulin Latest Ref Range: 2.3 - 3.5 g/dL 3.9 (H)   A-G Ratio Latest Ref Range: 1.2 - 3.5   0.7 (L)   ALT (SGPT) Latest Ref Range: 12 - 65 U/L 18   AST Latest Ref Range: 15 - 37 U/L 15   Alk. phosphatase Latest Ref Range: 50 - 136 U/L 67       12/12/19:  CT HEAD:  Age indeterminate infarctions within the left corona radiata/caudate.  Mild chronic small vessel ischemic changes and areas of remote infarction as described.  Probable partial volume averaging the region of the left thalamus rather than intraparenchymal hemorrhage.     12/12/19:  CTA HEAD AND NECK:      Stenosis at the distal segment of the right vertebral artery and multifocal  stenosis in the P2 segment of the left posterior cerebral artery.     12/12/19:  MRI BRAIN:  Acute to early subacute infarcts in the left cerebellar hemisphere, in the periventricular deep left frontoparietal white matter and likely additional areas of restricted diffusion in the right paramedian yariel. Old lacunar infarcts in the corpus striatum and periventricular white matter as well as within the left corona radiata.     1/8/20:  SWALLOW FUNCTION VIDEO: Small quantity aspiration with straw sips of thin liquids. Assessment/Plan:   Acute to early subacute infarcts in the left cerebellar hemisphere, in the periventricular deep left frontoparietal white matter and likely additional areas of restricted diffusion in the right paramedian yariel.  Old lacunar infarcts also.                  Making good progress with PT/OT                Speech and swallowing improved to the extent SLP signed      off                   Pending placement      Dysarthria due to stroke:  Improved immensely              SLP releasing 4/14 due to improved without need for further  intervention      Difficult placement with marital discord prior to stroke:  Wife does not want him at home:  CM working on tirelessly              Now issues obtaining MR for [employer] application      Hypertension:  Continue lisinopril, toprol XL     IDDM II:  Continue glucophage, diabetic diet. A1C: now 6.9, will discontinue SSI ac and HS.                DM education and management      Arrhythmia:  On beta blocker     PFO 12/17/2019       Hypomagnesemia:  Replace and recheck      Last labs:  4/21/20    Care Plan discussed with: Patient    Signed By: Isaura Hernández NP     April 23, 2020

## 2020-04-23 NOTE — PROGRESS NOTES
Problem: Pressure Injury - Risk of  Goal: *Prevention of pressure injury  Description: Document Dewey Scale and appropriate interventions in the flowsheet. Outcome: Progressing Towards Goal  Note: Pressure Injury Interventions:  Sensory Interventions: Assess changes in LOC    Moisture Interventions: Absorbent underpads, Apply protective barrier, creams and emollients, Check for incontinence Q2 hours and as needed, Limit adult briefs    Activity Interventions: Increase time out of bed, Pressure redistribution bed/mattress(bed type), PT/OT evaluation    Mobility Interventions: HOB 30 degrees or less, Pressure redistribution bed/mattress (bed type), PT/OT evaluation    Nutrition Interventions: Document food/fluid/supplement intake    Friction and Shear Interventions: HOB 30 degrees or less                Problem: Patient Education: Go to Patient Education Activity  Goal: Patient/Family Education  Outcome: Progressing Towards Goal     Problem: Falls - Risk of  Goal: *Absence of Falls  Description: Document Jeanne Fall Risk and appropriate interventions in the flowsheet.   Outcome: Progressing Towards Goal  Note: Fall Risk Interventions:  Mobility Interventions: Bed/chair exit alarm, OT consult for ADLs, Patient to call before getting OOB, PT Consult for mobility concerns    Mentation Interventions: Bed/chair exit alarm    Medication Interventions: Evaluate medications/consider consulting pharmacy, Bed/chair exit alarm, Patient to call before getting OOB, Teach patient to arise slowly    Elimination Interventions: Bed/chair exit alarm, Call light in reach, Patient to call for help with toileting needs, Stay With Me (per policy), Urinal in reach    History of Falls Interventions: Bed/chair exit alarm, Door open when patient unattended, Investigate reason for fall         Problem: Patient Education: Go to Patient Education Activity  Goal: Patient/Family Education  Outcome: Progressing Towards Goal     Problem: Diabetes Self-Management  Goal: *Disease process and treatment process  Description: Define diabetes and identify own type of diabetes; list 3 options for treating diabetes. Outcome: Progressing Towards Goal  Goal: *Incorporating nutritional management into lifestyle  Description: Describe effect of type, amount and timing of food on blood glucose; list 3 methods for planning meals. Outcome: Progressing Towards Goal  Goal: *Incorporating physical activity into lifestyle  Description: State effect of exercise on blood glucose levels. Outcome: Progressing Towards Goal  Goal: *Developing strategies to promote health/change behavior  Description: Define the ABC's of diabetes; identify appropriate screenings, schedule and personal plan for screenings. Outcome: Progressing Towards Goal  Goal: *Using medications safely  Description: State effect of diabetes medications on diabetes; name diabetes medication taking, action and side effects. Outcome: Progressing Towards Goal  Goal: *Monitoring blood glucose, interpreting and using results  Description: Identify recommended blood glucose targets  and personal targets. Outcome: Progressing Towards Goal  Goal: *Prevention, detection, treatment of acute complications  Description: List symptoms of hyper- and hypoglycemia; describe how to treat low blood sugar and actions for lowering  high blood glucose level. Outcome: Progressing Towards Goal  Goal: *Prevention, detection and treatment of chronic complications  Description: Define the natural course of diabetes and describe the relationship of blood glucose levels to long term complications of diabetes.   Outcome: Progressing Towards Goal  Goal: *Developing strategies to address psychosocial issues  Description: Describe feelings about living with diabetes; identify support needed and support network  Outcome: Progressing Towards Goal     Problem: Patient Education: Go to Patient Education Activity  Goal: Patient/Family Education  Outcome: Progressing Towards Goal     Problem: General Medical Care Plan  Goal: *Vital signs within specified parameters  Outcome: Progressing Towards Goal  Goal: *Labs within defined limits  Outcome: Progressing Towards Goal  Goal: *Absence of infection signs and symptoms  Description: Wash hand more often   Outcome: Progressing Towards Goal  Goal: *Optimal pain control at patient's stated goal  Outcome: Progressing Towards Goal  Goal: *Skin integrity maintained  Outcome: Progressing Towards Goal  Goal: *Fluid volume balance  Outcome: Progressing Towards Goal  Goal: *Optimize nutritional status  Outcome: Progressing Towards Goal  Goal: *Anxiety reduced or absent  Outcome: Progressing Towards Goal  Goal: *Progressive mobility and function (eg: ADL's)  Outcome: Progressing Towards Goal     Problem: Patient Education: Go to Patient Education Activity  Goal: Patient/Family Education  Outcome: Progressing Towards Goal     Problem: Patient Education: Go to Patient Education Activity  Goal: Patient/Family Education  Outcome: Progressing Towards Goal     Problem: Pain  Goal: *Control of Pain  Outcome: Progressing Towards Goal     Problem: Patient Education: Go to Patient Education Activity  Goal: Patient/Family Education  Outcome: Progressing Towards Goal

## 2020-04-23 NOTE — PROGRESS NOTES
Problem: Patient Education: Go to Patient Education Activity  Goal: Patient/Family Education  Outcome: Progressing Towards Goal     Problem: Pressure Injury - Risk of  Goal: *Prevention of pressure injury  Description: Document Dewey Scale and appropriate interventions in the flowsheet. Outcome: Progressing Towards Goal  Note: Pressure Injury Interventions:  Sensory Interventions: Assess changes in LOC    Moisture Interventions: Absorbent underpads    Activity Interventions: Increase time out of bed    Mobility Interventions: HOB 30 degrees or less    Nutrition Interventions: Document food/fluid/supplement intake    Friction and Shear Interventions: HOB 30 degrees or less                Problem: Patient Education: Go to Patient Education Activity  Goal: Patient/Family Education  Outcome: Progressing Towards Goal     Problem: Falls - Risk of  Goal: *Absence of Falls  Description: Document Jeanne Fall Risk and appropriate interventions in the flowsheet. Outcome: Progressing Towards Goal  Note: Fall Risk Interventions:  Mobility Interventions: Bed/chair exit alarm    Mentation Interventions: Bed/chair exit alarm    Medication Interventions: Patient to call before getting OOB    Elimination Interventions: Call light in reach    History of Falls Interventions: Bed/chair exit alarm         Problem: Patient Education: Go to Patient Education Activity  Goal: Patient/Family Education  Outcome: Progressing Towards Goal     Problem: Diabetes Self-Management  Goal: *Disease process and treatment process  Description: Define diabetes and identify own type of diabetes; list 3 options for treating diabetes. Outcome: Progressing Towards Goal  Goal: *Incorporating nutritional management into lifestyle  Description: Describe effect of type, amount and timing of food on blood glucose; list 3 methods for planning meals.   Outcome: Progressing Towards Goal  Goal: *Incorporating physical activity into lifestyle  Description: [facility] effect of exercise on blood glucose levels. Outcome: Progressing Towards Goal  Goal: *Developing strategies to promote health/change behavior  Description: Define the ABC's of diabetes; identify appropriate screenings, schedule and personal plan for screenings. Outcome: Progressing Towards Goal  Goal: *Using medications safely  Description: State effect of diabetes medications on diabetes; name diabetes medication taking, action and side effects. Outcome: Progressing Towards Goal  Goal: *Monitoring blood glucose, interpreting and using results  Description: Identify recommended blood glucose targets  and personal targets. Outcome: Progressing Towards Goal  Goal: *Prevention, detection, treatment of acute complications  Description: List symptoms of hyper- and hypoglycemia; describe how to treat low blood sugar and actions for lowering  high blood glucose level. Outcome: Progressing Towards Goal  Goal: *Prevention, detection and treatment of chronic complications  Description: Define the natural course of diabetes and describe the relationship of blood glucose levels to long term complications of diabetes.   Outcome: Progressing Towards Goal  Goal: *Developing strategies to address psychosocial issues  Description: Describe feelings about living with diabetes; identify support needed and support network  Outcome: Progressing Towards Goal     Problem: Patient Education: Go to Patient Education Activity  Goal: Patient/Family Education  Outcome: Progressing Towards Goal     Problem: Nutrition Deficit  Goal: *Optimize nutritional status  Outcome: Progressing Towards Goal     Problem: General Medical Care Plan  Goal: *Vital signs within specified parameters  Outcome: Progressing Towards Goal  Goal: *Labs within defined limits  Outcome: Progressing Towards Goal  Goal: *Absence of infection signs and symptoms  Description: Wash hand more often   Outcome: Progressing Towards Goal  Goal: *Optimal pain control at patient's stated goal  Outcome: Progressing Towards Goal  Goal: *Skin integrity maintained  Outcome: Progressing Towards Goal  Goal: *Fluid volume balance  Outcome: Progressing Towards Goal  Goal: *Optimize nutritional status  Outcome: Progressing Towards Goal  Goal: *Anxiety reduced or absent  Outcome: Progressing Towards Goal  Goal: *Progressive mobility and function (eg: ADL's)  Outcome: Progressing Towards Goal     Problem: Patient Education: Go to Patient Education Activity  Goal: Patient/Family Education  Outcome: Progressing Towards Goal     Problem: Patient Education: Go to Patient Education Activity  Goal: Patient/Family Education  Outcome: Progressing Towards Goal     Problem: Patient Education: Go to Patient Education Activity  Goal: Patient/Family Education  Outcome: Progressing Towards Goal     Problem: Patient Education: Go to Patient Education Activity  Goal: Patient/Family Education  Outcome: Progressing Towards Goal     Problem: Ischemic Stroke: Discharge Outcomes  Goal: *Verbalizes anxiety and depression are reduced or absent  Outcome: Progressing Towards Goal  Goal: *Verbalize understanding of risk factor modification(Stroke Metric)  Outcome: Progressing Towards Goal  Goal: *Hemodynamically stable  Outcome: Progressing Towards Goal  Goal: *Absence of aspiration pneumonia  Outcome: Progressing Towards Goal  Goal: *Aware of needed dietary changes  Outcome: Progressing Towards Goal  Goal: *Verbalize understanding of prescribed medications including anti-coagulants, anti-lipid, and/or anti-platelets(Stroke Metric)  Outcome: Progressing Towards Goal  Goal: *Tolerating diet  Outcome: Progressing Towards Goal  Goal: *Aware of follow-up diagnostics related to anticoagulants  Outcome: Progressing Towards Goal  Goal: *Ability to perform ADLs and demonstrates progressive mobility and function  Outcome: Progressing Towards Goal  Goal: *Absence of DVT(Stroke Metric)  Outcome: Progressing Towards Goal  Goal: *Absence of aspiration  Outcome: Progressing Towards Goal  Goal: *Optimal pain control at patient's stated goal  Outcome: Progressing Towards Goal  Goal: *Home safety concerns addressed  Outcome: Progressing Towards Goal  Goal: *Describes available resources and support systems  Outcome: Progressing Towards Goal  Goal: *Verbalizes understanding of activation of EMS(911) for stroke symptoms(Stroke Metric)  Outcome: Progressing Towards Goal  Goal: *Understands and describes signs and symptoms to report to providers(Stroke Metric)  Outcome: Progressing Towards Goal  Goal: *Neurolgocially stable (absence of additional neurological deficits)  Outcome: Progressing Towards Goal  Goal: *Verbalizes importance of follow-up with primary care physician(Stroke Metric)  Outcome: Progressing Towards Goal  Goal: *Smoking cessation discussed,if applicable(Stroke Metric)  Outcome: Progressing Towards Goal  Goal: *Depression screening completed(Stroke Metric)  Outcome: Progressing Towards Goal     Problem: Pain  Goal: *Control of Pain  Outcome: Progressing Towards Goal     Problem: Patient Education: Go to Patient Education Activity  Goal: Patient/Family Education  Outcome: Progressing Towards Goal     Problem: Patient Education: Go to Patient Education Activity  Goal: Patient/Family Education  Outcome: Progressing Towards Goal

## 2020-04-23 NOTE — PROGRESS NOTES
Problem: Mobility Impaired (Adult and Pediatric)  Goal: *Acute Goals and Plan of Care  Description  GOALS UPDATED ON RE-ASSESSMENT 4/19/2020 (ALL GOALS STILL APPLY) :  1. Patient will perform bed mobility with supervision and 0 verbal cues within 7 treatment days. 2. Patient will perform transfer bed to chair with SUPERVISION within 7 treatment days. 3. Patient will demonstrate fair+ dynamic balance throughout stance phase on L LE within 7 treatment days. 4. Patient will require STAND BY ASSIST throughout swing phase on (to advance) L LE within 7 treatment days. 5. Patient demonstrate 3+/5 strength in L LE hip flexion, hip abduction, and hip adduction within 7 treatment days. 6. Patient will ambulate 200ft+ with STAND BY ASSIST and least retrictive assistive device within 7 treatment days. 7. Patient will perform wheelchair mobility 150 ft with modified independence within 7 treatment days. 8. Mr. Sherine Joshi will be independent with wheelchair locking/unlocking mechanism as well as leg rest manipulation within 7 days to improve safety and independence. 9. Mr. Sherine Joshi will don/doff LUE sling for protection of L shoulder during transfers/gait within 7 treatment days. 10) Pt's spouse / caregiver comfortable & safe to assist pt with all functional mobility. 11) pt able to go up & down 12 steps with min A & bilateral rails.         PHYSICAL THERAPY: Daily Note and PM 4/23/2020  INPATIENT: PT Visit Days : 5  Payor: [address] [address] / Plan: [address] [place] [address] / Product Type: Medicaid /       NAME/AGE/GENDER: Calin Yen is a 53 y.o. male   PRIMARY DIAGNOSIS: Acute ischemic stroke ([address] [I63.9]  Cerebrovascular accident (CVA) due to embolism ([address] [T54.9] Acute embolic stroke ([address] Acute embolic stroke ([address]       ICD-10: Treatment Diagnosis:   · Difficulty in walking, Not elsewhere classified (R26.2)  · Hemiplegia and hemiparesis following cerebral infarction affecting left non-dominant side (P70.415)   Precaution/Allergies:  Patient has no known allergies. ASSESSMENT:     Mr. Henriquez is a 53year old admitted R MCA CVA.  Patient was supine upon contact and agreeable to PT. Patient performs supine to sit with supervision and additional time. Patient dons sling on LUE with min assist, additional time, and cues for technique. Patient then performs dynamic balance exercises seated EOB by reaching for cones (4 cones) placed on the ground on his left and right. Patient experienced one LOB when reaching with his RUE across his body for a cone placed by his left foot needing assistance from therapist to regain balance. Patient transfers to standing with CGA-SBA where he then participates in standing balance exercises by reaching for cones (4 cones) placed on the ground to his left and right. Patient able to complete task with CGA. Patient then ambulates 350' with luke walker nd focus on positioning of L foot during strike (heel first), knee flexion during swing phase of gait on L, and stance phase on L to improve foot placement and reduce external rotation. Patient needed cues periodically throughout gait to improve this and have proper gait sequencing. Gait mechanics decline as patient fatigues requiring increased cues. Patient then performs wheelchair mobility back to his room x 100' with SBA and cues for technique. Overall good progress towards physical therapy goals. Goals listed above are still appropriate. Will continue efforts as patient is still below functional baseline. This section established at most recent assessment   PROBLEM LIST (Impairments causing functional limitations):  1. Decreased Strength  2. Decreased ADL/Functional Activities  3. Decreased Transfer Abilities  4. Decreased Ambulation Ability/Technique  5. Decreased Balance  6. Increased Pain  7. Decreased Activity Tolerance  8. Increased Fatigue  9.  Decreased Flexibility/Joint Mobility   INTERVENTIONS PLANNED: (Benefits and precautions of physical therapy have been discussed with the patient.)  1. Balance Exercise  2. Bed Mobility  3. Family Education  4. Gait Training  5. Home Exercise Program (HEP)  6. Manual Therapy  7. Neuromuscular Re-education/Strengthening  8. Range of Motion (ROM)  9. Therapeutic Activites  10. Therapeutic Exercise/Strengthening  11. Transfer Training     TREATMENT PLAN: Frequency/Duration: 5 times a week for duration of hospital stay  Rehabilitation Potential For Stated Goals: Good     REHAB RECOMMENDATIONS (at time of discharge pending progress):    Placement: It is my opinion, based on this patient's performance to date, that Mr. Brianna Mcgarry may benefit from intensive therapy at an [address] [place] [address] after discharge due to a probable need for close medical supervision by a rehab physician, a probable need for multiple therapy disciplines and potential to make ongoing and sustainable functional improvement that is of practical value. .  Equipment:    TBD pending progress with therapy. HISTORY:   History of Present Injury/Illness (Reason for Referral):  Per H&P: \"Pt is a 52 y/o smoker with DM, HTN, who presented to ER with L leg and arm weakness, L facial droop, dysarthria. First noted L leg weakness late night 12/11 when he woke up to go to the bathroom. He was normal when he went to bed around 1030. Woke up this morning and had persistent weakness L leg and also now noted in L arm. EMS called. Noted to have slurred speech and L facial droop as well. Code S called in ER around 9am.  MRI with acute infarcts in L cerebellar hemisphere, deep frontoparietal white matter, and R paramedian yariel. Also noted old lacunar infarcts. No large vessel occlusion on CTA, but some stenosis noted. No hx afib, TIA, CVA. No CP, palpitations, SOB. \"  Past Medical History/Comorbidities:   Mr. Brianna Mcgarry  has a past medical history of Acute ischemic stroke [address] (12/12/2019), Acute pancreatitis (11/19/2014), Cerebrovascular accident (CVA) due to embolism ([facility] [address] [facility] (12/12/2019), Diabetes ([facility] (2002), Diabetes ([facility] Diabetes mellitus, and Hypertension. He also has no past medical history of Arthritis, Asthma, Autoimmune disease ([facility] CAD (coronary artery disease), Cancer ([facility] Chronic kidney disease, COPD, Dementia, Dementia ([facility] Heart failure ([address] Ill-defined condition, Infectious disease, Liver disease, Other ill-defined conditions(799.89), Psychiatric disorder, PUD (peptic ulcer disease), Seizures ([facility] or Sleep disorder. Mr. Krys Grimaldo  has a past surgical history that includes hx hernia repair and hx orthopaedic. Social History/Living Environment:   Home Environment: Apartment  # Steps to Enter: 12  Rails to Enter: Yes  [employer] : Bilateral  One/Two Story Residence: One story  Living Alone: No  Support Systems: Spouse/Significant Other/Partner  Patient Expects to be Discharged to[de-identified] Unknown  Current DME Used/Available at Home: None  Tub or Shower Type: Tub/Shower combination  Prior Level of Function/Work/Activity:  Independent, lives with wife in 2nd story 1 level apartment. No recent falls. Number of Personal Factors/Comorbidities that affect the Plan of Care: 1-2: MODERATE COMPLEXITY   EXAMINATION:   Most Recent Physical Functioning:   Gross Assessment: left hip grossly 2/5 to 3-/5                       Balance:  Sitting: Impaired  Sitting - Static: Good (unsupported)  Sitting - Dynamic: Fair (occasional)  Standing: Impaired  Standing - Static: Good  Standing - Dynamic : Fair Bed Mobility:  Supine to Sit: Supervision  Wheelchair Mobility: NT     Transfers:  Sit to Stand: Stand-by assistance;Contact guard assistance  Stand to Sit: Stand-by assistance;Contact guard assistance  Gait:     Base of Support: Narrowed; Shift to right  Speed/Clotilde: Delayed; Slow  Step Length: Left shortened;Right shortened  Gait Abnormalities: Hemiplegic  Distance (ft): 450 Feet (ft)  Assistive Device: Walker luke  Ambulation - Level of Assistance: Contact guard assistance;Stand-by assistance  Interventions: Manual cues; Safety awareness training; Tactile cues; Verbal cues      Body Structures Involved:  1. Nerves  2. Voice/Speech  3. Bones  4. Joints  5. Muscles Body Functions Affected:  1. Mental  2. Sensory/Pain  3. Neuromusculoskeletal  4. Movement Related Activities and Participation Affected:  1. General Tasks and Demands  2. Mobility  3. Self Care  4. Interpersonal Interactions and Relationships   Number of elements that affect the Plan of Care: 4+: HIGH COMPLEXITY   CLINICAL PRESENTATION:   Presentation: Evolving clinical presentation with changing clinical characteristics: MODERATE COMPLEXITY   CLINICAL DECISION MAKIN [facility] Mobility Inpatient Short Form  How much difficulty does the patient currently have. .. Unable A Lot A Little None   1. Turning over in bed (including adjusting bedclothes, sheets and blankets)? [] 1   [] 2   [] 3   [x] 4   2. Sitting down on and standing up from a chair with arms ( e.g., wheelchair, bedside commode, etc.)   [] 1   [] 2   [x] 3   [] 4   3. Moving from lying on back to sitting on the side of the bed? [] 1   [] 2   [] 3   [x] 4   How much help from another person does the patient currently need. .. Total A Lot A Little None   4. Moving to and from a bed to a chair (including a wheelchair)? [] 1   [] 2   [x] 3   [] 4   5. Need to walk in hospital room? [] 1   [] 2   [x] 3   [] 4   6. Climbing 3-5 steps with a railing? [] 1   [x] 2   [] 3   [] 4   © , Trustees of [address] [place] [address] [address] [address], under license to [employer]. All rights reserved      Score:  Initial: 13 Most Recent: 19 (Date: 4/10/2020)    Interpretation of Tool:  Represents activities that are increasingly more difficult (i.e. Bed mobility, Transfers, Gait).     Medical Necessity:     · Patient is expected to demonstrate progress in   · strength, range of motion, balance, coordination, and functional technique  ·  to   · increase independence with all mobility. · .  Reason for Services/Other Comments:  · Patient continues to require skilled intervention due to   · medical complications and mobility deficits which impact his level of function, safety, and independence as indicated above. · .   Use of outcome tool(s) and clinical judgement create a POC that gives a: Questionable prediction of patient's progress: MODERATE COMPLEXITY        TREATMENT:      Pre-treatment Symptoms/Complaints: see above  Pain: Initial: 0/10 Post Session:  0/10     Therapeutic Activity: (    27 Minutes) : Therapeutic activities including bed mobility training, transfer training from various surface heights, static/dynamic sitting/standing balance exercises (reaching for cones), ambulation on level ground with cues for gait mechanics, positioning of LLE during gait, wheel chair mobility/mechanics, and patient education  to improve mobility, strength and balance. Required moderate Manual cues; Safety awareness training; Tactile cues; Verbal cues to promote static and dynamic balance in standing and promote coordination of bilateral, lower extremity(s). Braces/Orthotics/Lines/Etc:   · Sling to LUE  Treatment/Session Assessment:    · Response to Treatment:  See above  · Interdisciplinary Collaboration:   o Physical Therapy Assistant  o Registered Nurse  · After treatment position/precautions:   o Bed/Chair-wheels locked  o Call light within reach  o RN notified  o Patient left sitting up in w/c with ambulation team   · Compliance with Program/Exercises: Compliant all of the time  · Recommendations/Intent for next treatment session: \"Next visit will focus on advancements to more challenging activities and reduction in assistance provided\".     Total Treatment Duration:  PT Patient Time In/Time Out  Time In: 1445  Time Out: 26[facility]

## 2020-04-24 PROCEDURE — 74011250637 HC RX REV CODE- 250/637: Performed by: INTERNAL MEDICINE

## 2020-04-24 PROCEDURE — 65270000029 HC RM PRIVATE

## 2020-04-24 PROCEDURE — 74011250637 HC RX REV CODE- 250/637: Performed by: HOSPITALIST

## 2020-04-24 PROCEDURE — 97530 THERAPEUTIC ACTIVITIES: CPT

## 2020-04-24 PROCEDURE — 74011250637 HC RX REV CODE- 250/637: Performed by: NURSE PRACTITIONER

## 2020-04-24 PROCEDURE — 97110 THERAPEUTIC EXERCISES: CPT

## 2020-04-24 PROCEDURE — 74011250636 HC RX REV CODE- 250/636: Performed by: INTERNAL MEDICINE

## 2020-04-24 RX ADMIN — ENOXAPARIN SODIUM 40 MG: 40 INJECTION SUBCUTANEOUS at 14:57

## 2020-04-24 RX ADMIN — DIPHENHYDRAMINE HYDROCHLORIDE 25 MG: 25 CAPSULE ORAL at 22:08

## 2020-04-24 RX ADMIN — ASPIRIN 81 MG 81 MG: 81 TABLET ORAL at 08:16

## 2020-04-24 RX ADMIN — Medication 400 MG: at 08:16

## 2020-04-24 RX ADMIN — GUAIFENESIN 100 MG: 100 SOLUTION ORAL at 22:09

## 2020-04-24 RX ADMIN — METOPROLOL SUCCINATE 25 MG: 25 TABLET, FILM COATED, EXTENDED RELEASE ORAL at 08:16

## 2020-04-24 RX ADMIN — METFORMIN HYDROCHLORIDE 500 MG: 500 TABLET ORAL at 17:10

## 2020-04-24 RX ADMIN — LISINOPRIL 40 MG: 20 TABLET ORAL at 08:16

## 2020-04-24 RX ADMIN — ACETAMINOPHEN 650 MG: 325 TABLET, FILM COATED ORAL at 05:37

## 2020-04-24 RX ADMIN — ATORVASTATIN CALCIUM 80 MG: 80 TABLET, FILM COATED ORAL at 22:08

## 2020-04-24 RX ADMIN — GUAIFENESIN 100 MG: 100 SOLUTION ORAL at 17:11

## 2020-04-24 RX ADMIN — ACETAMINOPHEN 650 MG: 325 TABLET, FILM COATED ORAL at 22:08

## 2020-04-24 RX ADMIN — METFORMIN HYDROCHLORIDE 500 MG: 500 TABLET ORAL at 08:16

## 2020-04-24 RX ADMIN — ACETAMINOPHEN 650 MG: 325 TABLET, FILM COATED ORAL at 14:57

## 2020-04-24 RX ADMIN — DIPHENHYDRAMINE HYDROCHLORIDE 25 MG: 25 CAPSULE ORAL at 17:11

## 2020-04-24 NOTE — PROGRESS NOTES
Problem: Pressure Injury - Risk of  Goal: *Prevention of pressure injury  Description: Document Dewey Scale and appropriate interventions in the flowsheet. Outcome: Progressing Towards Goal  Note: Pressure Injury Interventions:  Sensory Interventions: Assess changes in LOC    Moisture Interventions: Absorbent underpads, Apply protective barrier, creams and emollients, Check for incontinence Q2 hours and as needed, Limit adult briefs    Activity Interventions: Increase time out of bed, Pressure redistribution bed/mattress(bed type), PT/OT evaluation    Mobility Interventions: HOB 30 degrees or less, Pressure redistribution bed/mattress (bed type), PT/OT evaluation    Nutrition Interventions: Document food/fluid/supplement intake    Friction and Shear Interventions: HOB 30 degrees or less                Problem: Patient Education: Go to Patient Education Activity  Goal: Patient/Family Education  Outcome: Progressing Towards Goal     Problem: Falls - Risk of  Goal: *Absence of Falls  Description: Document Jeanne Fall Risk and appropriate interventions in the flowsheet.   Outcome: Progressing Towards Goal  Note: Fall Risk Interventions:  Mobility Interventions: Bed/chair exit alarm, OT consult for ADLs, Patient to call before getting OOB, PT Consult for mobility concerns    Mentation Interventions: Bed/chair exit alarm    Medication Interventions: Bed/chair exit alarm, Teach patient to arise slowly, Patient to call before getting OOB    Elimination Interventions: Bed/chair exit alarm, Call light in reach, Patient to call for help with toileting needs, Stay With Me (per policy), Urinal in reach    History of Falls Interventions: Bed/chair exit alarm, Door open when patient unattended, Investigate reason for fall         Problem: Patient Education: Go to Patient Education Activity  Goal: Patient/Family Education  Outcome: Progressing Towards Goal     Problem: Diabetes Self-Management  Goal: *Disease process and treatment process  Description: Define diabetes and identify own type of diabetes; list 3 options for treating diabetes. Outcome: Progressing Towards Goal  Goal: *Incorporating nutritional management into lifestyle  Description: Describe effect of type, amount and timing of food on blood glucose; list 3 methods for planning meals. Outcome: Progressing Towards Goal  Goal: *Incorporating physical activity into lifestyle  Description: State effect of exercise on blood glucose levels. Outcome: Progressing Towards Goal  Goal: *Developing strategies to promote health/change behavior  Description: Define the ABC's of diabetes; identify appropriate screenings, schedule and personal plan for screenings. Outcome: Progressing Towards Goal  Goal: *Using medications safely  Description: State effect of diabetes medications on diabetes; name diabetes medication taking, action and side effects. Outcome: Progressing Towards Goal  Goal: *Monitoring blood glucose, interpreting and using results  Description: Identify recommended blood glucose targets  and personal targets. Outcome: Progressing Towards Goal  Goal: *Prevention, detection, treatment of acute complications  Description: List symptoms of hyper- and hypoglycemia; describe how to treat low blood sugar and actions for lowering  high blood glucose level. Outcome: Progressing Towards Goal  Goal: *Prevention, detection and treatment of chronic complications  Description: Define the natural course of diabetes and describe the relationship of blood glucose levels to long term complications of diabetes.   Outcome: Progressing Towards Goal  Goal: *Developing strategies to address psychosocial issues  Description: Describe feelings about living with diabetes; identify support needed and support network  Outcome: Progressing Towards Goal     Problem: Patient Education: Go to Patient Education Activity  Goal: Patient/Family Education  Outcome: Progressing Towards Goal Problem: General Medical Care Plan  Goal: *Vital signs within specified parameters  Outcome: Progressing Towards Goal  Goal: *Labs within defined limits  Outcome: Progressing Towards Goal  Goal: *Absence of infection signs and symptoms  Description: Wash hand more often   Outcome: Progressing Towards Goal  Goal: *Optimal pain control at patient's stated goal  Outcome: Progressing Towards Goal  Goal: *Skin integrity maintained  Outcome: Progressing Towards Goal  Goal: *Fluid volume balance  Outcome: Progressing Towards Goal  Goal: *Optimize nutritional status  Outcome: Progressing Towards Goal  Goal: *Anxiety reduced or absent  Outcome: Progressing Towards Goal  Goal: *Progressive mobility and function (eg: ADL's)  Outcome: Progressing Towards Goal     Problem: Patient Education: Go to Patient Education Activity  Goal: Patient/Family Education  Outcome: Progressing Towards Goal     Problem: Patient Education: Go to Patient Education Activity  Goal: Patient/Family Education  Outcome: Progressing Towards Goal     Problem: Pain  Goal: *Control of Pain  Outcome: Progressing Towards Goal     Problem: Patient Education: Go to Patient Education Activity  Goal: Patient/Family Education  Outcome: Progressing Towards Goal

## 2020-04-24 NOTE — PROGRESS NOTES
Reviewed notes for spiritual concerns prior to visit  Visit with patient to build rapport with . Calm  Encouraged with presence and words of hope    Patient demonstrates confidence in the care team.  Appears to be coping with illness.     No physical contact with patient per guidelines  Assurance of ongoing prayers      Jany Lim,  Staff   C: [phone]  /  [email or web address]

## 2020-04-24 NOTE — PROGRESS NOTES
Problem: Patient Education: Go to Patient Education Activity  Goal: Patient/Family Education  Outcome: Progressing Towards Goal     Problem: Pressure Injury - Risk of  Goal: *Prevention of pressure injury  Description: Document Dewey Scale and appropriate interventions in the flowsheet. Outcome: Progressing Towards Goal  Note: Pressure Injury Interventions:  Sensory Interventions: Assess changes in LOC    Moisture Interventions: Absorbent underpads    Activity Interventions: Increase time out of bed    Mobility Interventions: HOB 30 degrees or less    Nutrition Interventions: Document food/fluid/supplement intake    Friction and Shear Interventions: HOB 30 degrees or less                Problem: Patient Education: Go to Patient Education Activity  Goal: Patient/Family Education  Outcome: Progressing Towards Goal     Problem: Falls - Risk of  Goal: *Absence of Falls  Description: Document Jeanne Fall Risk and appropriate interventions in the flowsheet. Outcome: Progressing Towards Goal  Note: Fall Risk Interventions:  Mobility Interventions: Bed/chair exit alarm    Mentation Interventions: Bed/chair exit alarm    Medication Interventions: Patient to call before getting OOB    Elimination Interventions: Call light in reach    History of Falls Interventions: Bed/chair exit alarm         Problem: Patient Education: Go to Patient Education Activity  Goal: Patient/Family Education  Outcome: Progressing Towards Goal     Problem: Diabetes Self-Management  Goal: *Disease process and treatment process  Description: Define diabetes and identify own type of diabetes; list 3 options for treating diabetes. Outcome: Progressing Towards Goal  Goal: *Incorporating nutritional management into lifestyle  Description: Describe effect of type, amount and timing of food on blood glucose; list 3 methods for planning meals.   Outcome: Progressing Towards Goal  Goal: *Incorporating physical activity into lifestyle  Description: [facility] effect of exercise on blood glucose levels. Outcome: Progressing Towards Goal  Goal: *Developing strategies to promote health/change behavior  Description: Define the ABC's of diabetes; identify appropriate screenings, schedule and personal plan for screenings. Outcome: Progressing Towards Goal  Goal: *Using medications safely  Description: State effect of diabetes medications on diabetes; name diabetes medication taking, action and side effects. Outcome: Progressing Towards Goal  Goal: *Monitoring blood glucose, interpreting and using results  Description: Identify recommended blood glucose targets  and personal targets. Outcome: Progressing Towards Goal  Goal: *Prevention, detection, treatment of acute complications  Description: List symptoms of hyper- and hypoglycemia; describe how to treat low blood sugar and actions for lowering  high blood glucose level. Outcome: Progressing Towards Goal  Goal: *Prevention, detection and treatment of chronic complications  Description: Define the natural course of diabetes and describe the relationship of blood glucose levels to long term complications of diabetes.   Outcome: Progressing Towards Goal  Goal: *Developing strategies to address psychosocial issues  Description: Describe feelings about living with diabetes; identify support needed and support network  Outcome: Progressing Towards Goal     Problem: Patient Education: Go to Patient Education Activity  Goal: Patient/Family Education  Outcome: Progressing Towards Goal     Problem: Nutrition Deficit  Goal: *Optimize nutritional status  Outcome: Progressing Towards Goal     Problem: General Medical Care Plan  Goal: *Vital signs within specified parameters  Outcome: Progressing Towards Goal  Goal: *Labs within defined limits  Outcome: Progressing Towards Goal  Goal: *Absence of infection signs and symptoms  Description: Wash hand more often   Outcome: Progressing Towards Goal  Goal: *Optimal pain control at patient's stated goal  Outcome: Progressing Towards Goal  Goal: *Skin integrity maintained  Outcome: Progressing Towards Goal  Goal: *Fluid volume balance  Outcome: Progressing Towards Goal  Goal: *Optimize nutritional status  Outcome: Progressing Towards Goal  Goal: *Anxiety reduced or absent  Outcome: Progressing Towards Goal  Goal: *Progressive mobility and function (eg: ADL's)  Outcome: Progressing Towards Goal     Problem: Patient Education: Go to Patient Education Activity  Goal: Patient/Family Education  Outcome: Progressing Towards Goal     Problem: Patient Education: Go to Patient Education Activity  Goal: Patient/Family Education  Outcome: Progressing Towards Goal     Problem: Patient Education: Go to Patient Education Activity  Goal: Patient/Family Education  Outcome: Progressing Towards Goal     Problem: Patient Education: Go to Patient Education Activity  Goal: Patient/Family Education  Outcome: Progressing Towards Goal     Problem: Ischemic Stroke: Discharge Outcomes  Goal: *Verbalizes anxiety and depression are reduced or absent  Outcome: Progressing Towards Goal  Goal: *Verbalize understanding of risk factor modification(Stroke Metric)  Outcome: Progressing Towards Goal  Goal: *Hemodynamically stable  Outcome: Progressing Towards Goal  Goal: *Absence of aspiration pneumonia  Outcome: Progressing Towards Goal  Goal: *Aware of needed dietary changes  Outcome: Progressing Towards Goal  Goal: *Verbalize understanding of prescribed medications including anti-coagulants, anti-lipid, and/or anti-platelets(Stroke Metric)  Outcome: Progressing Towards Goal  Goal: *Tolerating diet  Outcome: Progressing Towards Goal  Goal: *Aware of follow-up diagnostics related to anticoagulants  Outcome: Progressing Towards Goal  Goal: *Ability to perform ADLs and demonstrates progressive mobility and function  Outcome: Progressing Towards Goal  Goal: *Absence of DVT(Stroke Metric)  Outcome: Progressing Towards Goal  Goal: *Absence of aspiration  Outcome: Progressing Towards Goal  Goal: *Optimal pain control at patient's stated goal  Outcome: Progressing Towards Goal  Goal: *Home safety concerns addressed  Outcome: Progressing Towards Goal  Goal: *Describes available resources and support systems  Outcome: Progressing Towards Goal  Goal: *Verbalizes understanding of activation of EMS(911) for stroke symptoms(Stroke Metric)  Outcome: Progressing Towards Goal  Goal: *Understands and describes signs and symptoms to report to providers(Stroke Metric)  Outcome: Progressing Towards Goal  Goal: *Neurolgocially stable (absence of additional neurological deficits)  Outcome: Progressing Towards Goal  Goal: *Verbalizes importance of follow-up with primary care physician(Stroke Metric)  Outcome: Progressing Towards Goal  Goal: *Smoking cessation discussed,if applicable(Stroke Metric)  Outcome: Progressing Towards Goal  Goal: *Depression screening completed(Stroke Metric)  Outcome: Progressing Towards Goal     Problem: Pain  Goal: *Control of Pain  Outcome: Progressing Towards Goal     Problem: Patient Education: Go to Patient Education Activity  Goal: Patient/Family Education  Outcome: Progressing Towards Goal

## 2020-04-24 NOTE — PROGRESS NOTES
Problem: Self Care Deficits Care Plan (Adult)  Goal: *Acute Goals and Plan of Care (Insert Text)  Description  Goals per Re-evaluation on 3/18/2020:   1. Patient will demonstrate appropriate safety awareness and protection of L UE during bed mobility and functional transfers with minimal cues. (Progressing, still needs cues, 4/14/20)  2. Patient will complete total body bathing and dressing with Min A and adaptive equipment as needed. (Progressing 4/14/20  3. Patient will complete weightbearing into the L UE with ADL tasks with minimal assistance to improve ability to use as a functional assist during ADL tasks. (Progressing 4/14/20)4. Patient will demonstrate L UE SROM HEP within 7 days. (Progressing, 3/18/2020)  5. Pt will complete bed mobility with Mod I and minimal verbal cueing (Progressing, Supervision, 4/14/20). 6. Pt will complete dynamic sitting balance for ADLs at mod I with good balance (Progressing, 4/14/20  7. Pt will complete functional transfers with Supervision with equipment as needed. (Progressing, SBA to CGA 4/14/20)  8. Pt will complete grooming tasks in standing at sink level x5 mins with good balance and equipment as needed MET  9. Pt will complete grooming standing at sink level with SBA and use of adaptive equipment as needed. MET  10. Pt will don/doff UE sling with SBA and use of minimal verbal and visual cueing for correct application (Progressing, Min A 4/14/20. Goals below remain appropriate as of 4/24/2020:  1. Patient will demonstrate appropriate safety awareness and protection of L UE during bed mobility and functional transfers with no verbal cues. CONTINUE   2. Patient will complete lower body bathing and dressing with Min A and adaptive equipment as needed. CONTINUE  3. Patient will complete upper body bathing and dressing with SBA and adaptive equipment as needed.  CONTINUE  4. Patient will complete weightbearing into the L UE with ADL tasks with minimal assistance to improve ability to use as a functional assist during ADL tasks. CONTINUE  5. Patient will demonstrate L UE SROM HEP within 7 days. CONTINUE  6. Pt will complete bed mobility with Mod I and no verbal cueing. CONTINUE  7. Pt will complete functional transfers with Supervision with equipment as needed. CONTINUE  8. Pt will don/doff UE sling with Mod I and no verbal cueing. CONTINUE  9. Pt will complete toileting with SBA for toilet transfer and gown management. CONTINUE  10. Patient will participate in using L UE as a functional assist for ADL for 15 minutes with minimal facilitation. CONTINUE  11. Patient will complete sit to stand at midline with minimal assistance and cueing. CONTINUE  12. Patient will complete therapeutic exercises with L UE with minimal tactile cues for facilitation of the LUE. CONTINUE    Outcome: Progressing Towards Goal     OCCUPATIONAL THERAPY: Daily Note and PM    4/24/2020  INPATIENT: OT Visit Days: 1  Payor: [id number] / Plan: [place] / Product Type: Medicaid /      NAME/AGE/GENDER: Priti Kelly is a 53 y.o. male   PRIMARY DIAGNOSIS:  Acute ischemic stroke ([address] [I63.9]  Cerebrovascular accident (CVA) due to embolism ([address] [O94.9] Acute embolic stroke ([address] Acute embolic stroke ([address]       ICD-10: Treatment Diagnosis:    · Generalized Muscle Weakness (M62.81)  · Other lack of cordination (R27.8)  · Hemiplegia and hemiparesis following cerebral infarction affecting   · left non-dominant side (I69.354)  · Abnormal posture (R29.3)   Precautions/Allergies:    NO PULLING ON LUE   LUE in sling with shoulder joint approximated and supported, needs taping   Patient has no known allergies. ASSESSMENT:     Mr. Jackie Anderson presents to the hospital with L sided weakness and acute ischemic CVA. MRI revealed acute to subacute L cerebellar and R paramedian yariel infarcts. 4/24/2020: Pt seen for 7th visit. Goals above reviewed and remain appropriate.  Pt supine in bed upon arrival. Pt alert and agreeable to be seen by therapy, but does seem a bit fatigued this session. Pt requires SBA for supine to sit. Seated edge of bed, pt with intact static and dynamic seated balance. Pt requires CGA/SBA with use luke walker for sit to stand. Pt with good static and fair dynamic standing balance. Pt requires CGA with use of luke walker to walk from bed to recliner chair and requires CGA/SBA with use of luke walker for stand to sit. Pt completed PROM exercises in L UE with use of R UE to assist. Pt also performed fine motor exercises to increase ROM, strength, and coordination in digits of L hand. Pt used R UE for L wrist stabilization to grasp small objects with L hand and observed to have increased independence for object release this session with use from RUE to assist. Pt participated in [facility] thumb flexion exercises with use of yellow putty and required Total A from therapy to complete as pt as minimal movement in this digit. Specific exercises performed listed in grid below. Pt good participant during exercises and motivated to complete. Pt did require increased time to perform exercises today. Pt left seated upright in recliner all needs met and within reach at this time. RN notified. Will continue POC. This section established at most recent assessment   PROBLEM LIST (Impairments causing functional limitations):  1. Decreased Strength  2. Decreased ADL/Functional Activities  3. Decreased Transfer Abilities  4. Decreased Ambulation Ability/Technique  5. Decreased Balance  6. Decreased Activity Tolerance  7. Increased Fatigue  8. Decreased Flexibility/Joint Mobility  9. Decreased Knowledge of Precautions  10. Decreased [place] with Home Exercise Program   INTERVENTIONS PLANNED: (Benefits and precautions of occupational therapy have been discussed with the patient.)  1. Activities of daily living training  2. Adaptive equipment training  3. Balance training  4.  Clothing management  5. Cognitive training  6. Donning&doffing training  7. Keanu tech training  8. Neuromuscular re-eduation  9. Therapeutic activity  10. Therapeutic exercise     TREATMENT PLAN: Frequency/Duration: Follow patient 3 times per week to address above goals. Rehabilitation Potential For Stated Goals: Excellent     REHAB RECOMMENDATIONS (at time of discharge pending progress):    Placement: It is my opinion, based on this patient's performance to date, that Mr. Yvette Lindsey may benefit from intensive therapy at an [address] [place] [address] after discharge due to potential to make ongoing and sustainable functional improvement that is of practical value. Jean Pierre Guzman Pt functioning far below independent baseline, demonstrating good improvement and participation. Pt would likely benefit greatly and increase independence from inpatient rehab stay. Equipment:    [employer]               OCCUPATIONAL PROFILE AND HISTORY:   History of Present Injury/Illness (Reason for Referral):  See H&P  Past Medical History/Comorbidities:   Mr. Yvette Lindsey  has a past medical history of Acute ischemic stroke ([address] (12/12/2019), Acute pancreatitis (11/19/2014), Cerebrovascular accident (CVA) due to embolism ([address] (12/12/2019), Diabetes ([address] (2002), Diabetes ([address] Diabetes mellitus, and Hypertension. He also has no past medical history of Arthritis, Asthma, Autoimmune disease ([address] CAD (coronary artery disease), Cancer ([address] Chronic kidney disease, COPD, Dementia, Dementia ([address] Heart failure ([address] Ill-defined condition, Infectious disease, Liver disease, Other ill-defined conditions(799.89), Psychiatric disorder, PUD (peptic ulcer disease), Seizures ([address] or Sleep disorder. Mr. Yvette Lindsey  has a past surgical history that includes hx hernia repair and hx orthopaedic.   Social History/Living Environment:   Home Environment: Apartment  # Steps to Enter: 12  Rails to Enter: Yes  [employer] : Bilateral  One/Two Story Residence: One story  Living Alone: No  Support Systems: Spouse/Significant Other/Partner  Patient Expects to be Discharged to[de-identified] Unknown  Current DME Used/Available at Home: None  Tub or Shower Type: Tub/Shower combination  Prior Level of Function/Work/Activity:  Pt lives at home with his wife. Pt is typically independent with ADL/functional mobility. Pt does not drive. Pt was working part-time at [employer]. Personal Factors:          Age:  53 y.o. Past/Current Experience:  CVA with flaccid L side        Other factors that influence how disability is experienced by the patient:  multiple co-morbidities    Number of Personal Factors/Comorbidities that affect the Plan of Care: Extensive review of physical, cognitive, and psychosocial performance (3+):  HIGH COMPLEXITY   ASSESSMENT OF OCCUPATIONAL PERFORMANCE[de-identified]   Activities of Daily Living:   Basic ADLs (From Assessment) Complex ADLs (From Assessment)   Feeding: Setup  Oral Facial Hygiene/Grooming: Minimum assistance  Bathing: Minimum assistance, Moderate assistance  Upper Body Dressing: Minimum assistance  Lower Body Dressing: Moderate assistance  Toileting: Minimum assistance Instrumental ADL  Meal Preparation: Total assistance  Homemaking: Total assistance  Medication Management: Total assistance  Financial Management: Total assistance   Grooming/Bathing/Dressing Activities of Daily Living         Upper Body Bathing  Bathing Assistance: Min A   Position Performed: Seated in chair                  Lower Body Dressing Assistance  Socks:  Total assistance (dependent) Bed/Mat Mobility  Supine to Sit: Stand-by assistance  Sit to Supine: Supervision  Sit to Stand: Contact guard assistance;Stand-by assistance  Stand to Sit: Contact guard assistance;Stand-by assistance  Bed to Chair: Contact guard assistance     Most Recent Physical Functioning:   Gross Assessment:                  Posture:     Balance:  Sitting: Impaired  Sitting - Static: Good (unsupported)  Sitting - Dynamic: Fair (occasional)  Standing: Impaired  Standing - Static: Good  Standing - Dynamic : Fair Bed Mobility:  Supine to Sit: Stand-by assistance  Sit to Supine: Supervision  Wheelchair Mobility:     Transfers:  Sit to Stand: Contact guard assistance;Stand-by assistance  Stand to Sit: Contact guard assistance;Stand-by assistance  Bed to Chair: Contact guard assistance            Patient Vitals for the past 6 hrs:   BP BP Patient Position SpO2 Pulse   20 1200 141/83 At rest 96 % 79       Mental Status  Neurologic State: Alert  Orientation Level: Oriented X4  Cognition: Follows commands  Perception: Cues to attend to left side of body, Cues to maintain midline in sitting, Cues to maintain midline in standing  Perseveration: No perseveration noted  Safety/Judgement: Fall prevention, Home safety                          Physical Skills Involved:  1. Range of Motion  2. Balance  3. Strength  4. Activity Tolerance  5. Fine Motor Control  6. Gross Motor Control Cognitive Skills Affected (resulting in the inability to perform in a timely and safe manner):  1. Perception  2. Expression Psychosocial Skills Affected:  1. Habits/Routines  2. Environmental Adaptation  3. Social Interaction  4. Emotional Regulation  5. Self-Awareness  6. Awareness of Others  7. Social Roles   Number of elements that affect the Plan of Care: 5+:  HIGH COMPLEXITY   CLINICAL DECISION MAKIN [facility] [address] [address] AM-PAC 6 Clicks   Daily Activity Inpatient Short Form  How much help from another person does the patient currently need. .. Total A Lot A Little None   1. Putting on and taking off regular lower body clothing? [] 1   [x] 2   [] 3   [] 4   2. Bathing (including washing, rinsing, drying)? [] 1   [] 2   [x] 3   [] 4   3. Toileting, which includes using toilet, bedpan or urinal?   [] 1   [] 2   [x] 3   [] 4   4. Putting on and taking off regular upper body clothing? [] 1   [] 2   [x] 3   [] 4   5.   Taking care of personal grooming such as brushing teeth? [] 1   [] 2   [x] 3   [] 4   6. Eating meals? [] 1   [] 2   [x] 3   [] 4   © 2007, Trustees of [address] [place] [address] [address] [address], under license to [employer]. All rights reserved      Score:  Initial: 11 Most Recent: 17 (4/12/20)    Interpretation of Tool:  Represents activities that are increasingly more difficult (i.e. Bed mobility, Transfers, Gait). Medical Necessity:     · Patient demonstrates   · good and excellent  ·  rehab potential due to higher previous functional level. Reason for Services/Other Comments:  · Patient continues to require skilled intervention due to   · Decreased independence with ADL/functional transfers that impacts overall quality of life. · .   Use of outcome tool(s) and clinical judgement create a POC that gives a: MODERATE COMPLEXITY         TREATMENT:   (In addition to Assessment/Re-Assessment sessions the following treatments were rendered)     Pre-treatment Symptoms/Complaints: \"I'm trying my best.\" Pt reply when completing upper extremity exercises. Pain: Initial:   Pain Intensity 1: 8/10  Pain Location: Dorsal surface of hand Post Session: Unchanged     Therapeutic Exercise: (39 minutes):  Exercises per grid below to improve mobility, strength, balance and coordinationRequired moderate visual, verbal, manual and tactile cues to promote proper body alignment, promote proper body posture and promote proper body mechanics. Progressed repetitions and complexity of movement as indicated.      Date:  4/15/20 Date:  4/16/20 Date:  4/20/20 Date:   4/22/2020 Date:  4/24/2020   Activity/Exercise Parameters Parameters Parameters Parameters Parameters   Shoulder flexion SROM/AAROM 15 reps with L UE supported at the elbow by R UE (not past 90 degrees) 15 reps with L UE supported at the elbow by R UE (not past 90 degrees) 20 reps with L UE supported at the elbow by R UE (not past 90 degrees)     Elbow flexion/extension SROM/AAROM 15 reps  15 reps  20 reps     Forearm supination/pronation  12 reps additional time 15 reps      Wrist extension  To ~15-20 degrees for 2 sets x 10 reps  15 reps 20 reps     Finger flexion/extension  AAROM for extension with end range stretch x 10 reps  Educated on SROM for finger extension stretch/finger flexion stretch  10 reps      Washcloth slides   Forward reach x 15 reps with mod facilitation; shoulder horizontal abd/add x 15 reps  Forward reach x 15 reps with mod facilitation; shoulder horizontal abd/add x 15 reps with mod facilitation     Forward Reaching with soccer ball    20 reps (using two handed technique with R hand over left; therapist supporting L elbow) 20 reps (using two handed technique with R hand over left; therapist supporting L elbow)   Shoulder Horizontal Abduction & Adduction with soccer ball    20 reps each way (using two handed technique with R hand over left; therapist supporting L elbow) 20 reps each way (using two handed technique with R hand over left; therapist supporting L elbow)   Shoulder Flexion & Crossing Midline with cones    14 reps (pt stabilized L wrist; therapist supporting L elbow) 14 reps (pt stabilized L wrist; therapist supporting L elbow)   Digit Flexion & Extension with pegs    24 reps (using two handed technique with assist needed for object release) 24 reps (using two handed technique with decreased assist needed for object release)   1st CMC Flexion      10 reps (using yellow putty with Total A from therapist to complete)     Neuromuscular Re-education: (0 minutes):  Exercise/activities per grid below to improve balance, coordination and posture. Required moderate visual, verbal, manual and tactile cues to promote dynamic balance in standing and promote motor control of left, upper extremity(s).            Date:  4/7/20 Date:  4/14/20  Date:  4/15/20 Date:   4/16/20   Activity/Exercise Parameters Parameters Parameters    Midline orientation sit to stand  Moderate cues and facilitation to decrease rotation at trunk and for midline trunk position Moderate facilitation at the L LE and for decreased rotation at the trunk     Midline sit to squat   Min to mod A w/ hands in his lap      Weightbearing into L side standing at sink  Minimal facilitation at the L UE; ~10 reps       Forward reach with cane 10 reps with facilitation at the elbow/scapula  10 reps with moderate facilitation at the elbow/scapula 15 reps with moderate facilitation at the elbow/scapula into protraction  15 reps with moderate facilitation at the elbow/scapula into protraction   External rotation with cane 10 reps with minimal facilitation  10 reps with minimal facilitation  15 reps with SBA/CGA  15 reps with SBA/CGA   Posture  Upright posture with thoracic extension and B hands place in the lap  Upright sitting/standing with verbal/visual cueing  Pt encouraged for upright posture with moderate cues throughout session  Pt encouraged for upright posture with moderate cues throughout session    Weightbearing into elbow   10 reps with moderate assistance pushing up into midline  2 sets with prolonged weight bearing and reaching to the L of midline for 2 sets x 15 reps  Sitting at the table with pt encouraged for propped elbows and weightbearing through the L with moderate cues    Weightbearing into hand  Mod to maximal assistance with facilitation at the elbow; 2 sets x 10 reps      Elbow flexion  10 reps AAROM; 10 reps holding ball in B hands      Grasp release of washcloth   10 reps with moderate to maximal facilitation for reaching   4-5 reps with additional time    Trunk Rotation    15 reps with minima facilitation  15 reps with minimal facilitation and additional time           Side Scooting    Minimal facilitation and assistance for positioning and weightbearing of L Hand through his L knee and forward forward flexion at the trunk       Braces/Orthotics/Lines/Etc:   · O2 device: Room air  Treatment/Session Assessment:    Response to Treatment:  Pt good participant with new exercises. · Interdisciplinary Collaboration:   o Occupational Therapist  o Registered Nurse  · After treatment position/precautions:   o Up in chair  o Bed/Chair-wheels locked  o Bed in low position  o Call light within reach  o RN notified  o [address] [place] [address] [place] in Place   · Compliance with Program/Exercises: Compliant all of the time, Will assess as treatment progresses. · Recommendations/Intent for next treatment session: \"Next visit will focus on advancements to more challenging activities and reduction in assistance provided\".   Total Treatment Duration:   OT Patient Time In/Time Out  Time In: 1425  Time Out: 1504     Mary Fairchild

## 2020-04-24 NOTE — PROGRESS NOTES
Problem: Mobility Impaired (Adult and Pediatric)  Goal: *Acute Goals and Plan of Care  Description  GOALS UPDATED ON RE-ASSESSMENT 4/19/2020 (ALL GOALS STILL APPLY) :  1. Patient will perform bed mobility with supervision and 0 verbal cues within 7 treatment days. 2. Patient will perform transfer bed to chair with SUPERVISION within 7 treatment days. 3. Patient will demonstrate fair+ dynamic balance throughout stance phase on L LE within 7 treatment days. 4. Patient will require STAND BY ASSIST throughout swing phase on (to advance) L LE within 7 treatment days. 5. Patient demonstrate 3+/5 strength in L LE hip flexion, hip abduction, and hip adduction within 7 treatment days. 6. Patient will ambulate 200ft+ with STAND BY ASSIST and least retrictive assistive device within 7 treatment days. 7. Patient will perform wheelchair mobility 150 ft with modified independence within 7 treatment days. 8. Mr. Yvette Lindsey will be independent with wheelchair locking/unlocking mechanism as well as leg rest manipulation within 7 days to improve safety and independence. 9. Mr. Yvette Lindsey will don/doff LUE sling for protection of L shoulder during transfers/gait within 7 treatment days. 10) Pt's spouse / caregiver comfortable & safe to assist pt with all functional mobility. 11) pt able to go up & down 12 steps with min A & bilateral rails.         PHYSICAL THERAPY: Daily Note and AM 4/24/2020  INPATIENT: PT Visit Days : 6  Payor: [address] [address] / Plan: [address] [place] [address] / Product Type: Medicaid /       NAME/AGE/GENDER: Dio Pavon is a 53 y.o. male   PRIMARY DIAGNOSIS: Acute ischemic stroke ([address] [I63.9]  Cerebrovascular accident (CVA) due to embolism ([address] [C29.5] Acute embolic stroke ([address] Acute embolic stroke ([address]       ICD-10: Treatment Diagnosis:   · Difficulty in walking, Not elsewhere classified (R26.2)  · Hemiplegia and hemiparesis following cerebral infarction affecting left non-dominant side (K38.505)   Precaution/Allergies:  Patient has no known allergies. ASSESSMENT:     Mr. Henriquez is a 53year old admitted R MCA CVA.  Patient was supine upon contact and agreeable to PT. Patient performs supine to sit with supervision and additional time. Patient dons sling on LUE with min assist, additional time, and cues for technique. Patient transfers to standing with SBA-CGA. Patient then ambulates 350' with luke walker and focus on positioning of L foot during strike (heel first), knee flexion during swing phase of gait on L, and stance phase on L to improve foot placement and reduce external rotation. Patient needed cues periodically throughout gait to improve this and have proper gait sequencing. Gait mechanics decline as patient fatigues requiring increased cues. Patient also worked on dynamic standing balance by picking up a cone placed on the ground every 20-25'. Patient able to complete this task with SBA. Patient was then rolled back to his room where he returned to supine with supervision. Overall good progress towards physical therapy goals. Goals listed above are still appropriate. Will continue efforts as patient is still below functional baseline. This section established at most recent assessment   PROBLEM LIST (Impairments causing functional limitations):  1. Decreased Strength  2. Decreased ADL/Functional Activities  3. Decreased Transfer Abilities  4. Decreased Ambulation Ability/Technique  5. Decreased Balance  6. Increased Pain  7. Decreased Activity Tolerance  8. Increased Fatigue  9. Decreased Flexibility/Joint Mobility   INTERVENTIONS PLANNED: (Benefits and precautions of physical therapy have been discussed with the patient.)  1. Balance Exercise  2. Bed Mobility  3. Family Education  4. Gait Training  5. Home Exercise Program (HEP)  6. Manual Therapy  7. Neuromuscular Re-education/Strengthening  8. Range of Motion (ROM)  9. Therapeutic Activites  10.  Therapeutic Exercise/Strengthening  11. Transfer Training     TREATMENT PLAN: Frequency/Duration: 5 times a week for duration of hospital stay  Rehabilitation Potential For Stated Goals: Good     REHAB RECOMMENDATIONS (at time of discharge pending progress):    Placement: It is my opinion, based on this patient's performance to date, that Mr. Patty Brown may benefit from intensive therapy at an [address] [place] [address] after discharge due to a probable need for close medical supervision by a rehab physician, a probable need for multiple therapy disciplines and potential to make ongoing and sustainable functional improvement that is of practical value. .  Equipment:    TBD pending progress with therapy. HISTORY:   History of Present Injury/Illness (Reason for Referral):  Per H&P: \"Pt is a 52 y/o smoker with DM, HTN, who presented to ER with L leg and arm weakness, L facial droop, dysarthria. First noted L leg weakness late night 12/11 when he woke up to go to the bathroom. He was normal when he went to bed around 1030. Woke up this morning and had persistent weakness L leg and also now noted in L arm. EMS called. Noted to have slurred speech and L facial droop as well. Code S called in ER around 9am.  MRI with acute infarcts in L cerebellar hemisphere, deep frontoparietal white matter, and R paramedian yariel. Also noted old lacunar infarcts. No large vessel occlusion on CTA, but some stenosis noted. No hx afib, TIA, CVA. No CP, palpitations, SOB. \"  Past Medical History/Comorbidities:   Mr. Patty Brown  has a past medical history of Acute ischemic stroke ([address] (12/12/2019), Acute pancreatitis (11/19/2014), Cerebrovascular accident (CVA) due to embolism ([address] (12/12/2019), Diabetes ([address] (2002), Diabetes ([address] Diabetes mellitus, and Hypertension.  He also has no past medical history of Arthritis, Asthma, Autoimmune disease ([address] CAD (coronary artery disease), Cancer ([address] Chronic kidney disease, COPD, Dementia, Dementia ([facility] [address] Heart failure ([facility] [address] Ill-defined condition, Infectious disease, Liver disease, Other ill-defined conditions(799.89), Psychiatric disorder, PUD (peptic ulcer disease), Seizures ([facility] [address] or Sleep disorder. Mr. Fernando Bell  has a past surgical history that includes hx hernia repair and hx orthopaedic. Social History/Living Environment:   Home Environment: Apartment  # Steps to Enter: 12  Rails to Enter: Yes  [employer] : Bilateral  One/Two Story Residence: One story  Living Alone: No  Support Systems: Spouse/Significant Other/Partner  Patient Expects to be Discharged to[de-identified] Unknown  Current DME Used/Available at Home: None  Tub or Shower Type: Tub/Shower combination  Prior Level of Function/Work/Activity:  Independent, lives with wife in 2nd story 1 level apartment. No recent falls. Number of Personal Factors/Comorbidities that affect the Plan of Care: 1-2: MODERATE COMPLEXITY   EXAMINATION:   Most Recent Physical Functioning:   Gross Assessment: left hip grossly 2/5 to 3-/5                       Balance:  Sitting: Impaired  Sitting - Static: Good (unsupported)  Sitting - Dynamic: Fair (occasional)  Standing: Impaired  Standing - Static: Good  Standing - Dynamic : Fair Bed Mobility:  Supine to Sit: Supervision  Sit to Supine: Supervision  Wheelchair Mobility: NT     Transfers:  Sit to Stand: Contact guard assistance;Stand-by assistance  Stand to Sit: Contact guard assistance;Stand-by assistance  Gait:     Base of Support: Narrowed; Shift to right  Speed/Clotilde: Delayed; Slow  Step Length: Left shortened;Right shortened  Gait Abnormalities: Hemiplegic  Distance (ft): 350 Feet (ft)  Assistive Device: Walker luke  Ambulation - Level of Assistance: Stand-by assistance;Contact guard assistance  Interventions: Safety awareness training; Tactile cues; Verbal cues      Body Structures Involved:  1. Nerves  2. Voice/Speech  3. Bones  4. Joints  5.  Muscles Body Functions Affected:  1. Mental  2. Sensory/Pain  3. Neuromusculoskeletal  4. Movement Related Activities and Participation Affected:  1. General Tasks and Demands  2. Mobility  3. Self Care  4. Interpersonal Interactions and Relationships   Number of elements that affect the Plan of Care: 4+: HIGH COMPLEXITY   CLINICAL PRESENTATION:   Presentation: Evolving clinical presentation with changing clinical characteristics: MODERATE COMPLEXITY   CLINICAL DECISION MAKIN [facility] Mobility Inpatient Short Form  How much difficulty does the patient currently have. .. Unable A Lot A Little None   1. Turning over in bed (including adjusting bedclothes, sheets and blankets)? [] 1   [] 2   [] 3   [x] 4   2. Sitting down on and standing up from a chair with arms ( e.g., wheelchair, bedside commode, etc.)   [] 1   [] 2   [x] 3   [] 4   3. Moving from lying on back to sitting on the side of the bed? [] 1   [] 2   [] 3   [x] 4   How much help from another person does the patient currently need. .. Total A Lot A Little None   4. Moving to and from a bed to a chair (including a wheelchair)? [] 1   [] 2   [x] 3   [] 4   5. Need to walk in hospital room? [] 1   [] 2   [x] 3   [] 4   6. Climbing 3-5 steps with a railing? [] 1   [x] 2   [] 3   [] 4   © 2007, Trustees of [address] [place] [address], under license to [employer]. All rights reserved      Score:  Initial: 13 Most Recent: 19 (Date: 4/10/2020)    Interpretation of Tool:  Represents activities that are increasingly more difficult (i.e. Bed mobility, Transfers, Gait). Medical Necessity:     · Patient is expected to demonstrate progress in   · strength, range of motion, balance, coordination, and functional technique  ·  to   · increase independence with all mobility.    · .  Reason for Services/Other Comments:  · Patient continues to require skilled intervention due to   · medical complications and mobility deficits which impact his level of function, safety, and independence as indicated above. · .   Use of outcome tool(s) and clinical judgement create a POC that gives a: Questionable prediction of patient's progress: MODERATE COMPLEXITY        TREATMENT:      Pre-treatment Symptoms/Complaints: see above  Pain: Initial: 0/10 Post Session:  0/10     Therapeutic Activity: (    27 Minutes) : Therapeutic activities including bed mobility training, transfer training from various surface heights, static/dynamic standing balance exercises (picking up cones off the floor), ambulation on level ground with cues for gait mechanics, positioning of LLE during gait, wheel chair mobility/mechanics, and patient education  to improve mobility, strength and balance. Required moderate Safety awareness training; Tactile cues; Verbal cues to promote static and dynamic balance in standing and promote coordination of bilateral, lower extremity(s). Braces/Orthotics/Lines/Etc:   · Sling to LUE  Treatment/Session Assessment:    · Response to Treatment:  See above  · Interdisciplinary Collaboration:   o Physical Therapy Assistant  o Registered Nurse  · After treatment position/precautions:   o Supine in bed  o Bed/Chair-wheels locked  o Bed in low position  o Call light within reach  o RN notified   · Compliance with Program/Exercises: Compliant all of the time  · Recommendations/Intent for next treatment session: \"Next visit will focus on advancements to more challenging activities and reduction in assistance provided\".     Total Treatment Duration:  PT Patient Time In/Time Out  Time In: 1129  Time Out: [address] [facility]

## 2020-04-25 PROCEDURE — 74011250636 HC RX REV CODE- 250/636: Performed by: INTERNAL MEDICINE

## 2020-04-25 PROCEDURE — 65270000029 HC RM PRIVATE

## 2020-04-25 PROCEDURE — 74011250637 HC RX REV CODE- 250/637: Performed by: NURSE PRACTITIONER

## 2020-04-25 PROCEDURE — 74011250637 HC RX REV CODE- 250/637: Performed by: INTERNAL MEDICINE

## 2020-04-25 PROCEDURE — 74011250637 HC RX REV CODE- 250/637: Performed by: HOSPITALIST

## 2020-04-25 PROCEDURE — 97530 THERAPEUTIC ACTIVITIES: CPT

## 2020-04-25 RX ADMIN — ATORVASTATIN CALCIUM 80 MG: 80 TABLET, FILM COATED ORAL at 22:23

## 2020-04-25 RX ADMIN — ASPIRIN 81 MG 81 MG: 81 TABLET ORAL at 08:25

## 2020-04-25 RX ADMIN — METFORMIN HYDROCHLORIDE 500 MG: 500 TABLET ORAL at 08:25

## 2020-04-25 RX ADMIN — Medication 400 MG: at 08:25

## 2020-04-25 RX ADMIN — GUAIFENESIN 100 MG: 100 SOLUTION ORAL at 17:23

## 2020-04-25 RX ADMIN — METOPROLOL SUCCINATE 25 MG: 25 TABLET, FILM COATED, EXTENDED RELEASE ORAL at 08:25

## 2020-04-25 RX ADMIN — DIPHENHYDRAMINE HYDROCHLORIDE 25 MG: 25 CAPSULE ORAL at 22:23

## 2020-04-25 RX ADMIN — GUAIFENESIN 100 MG: 100 SOLUTION ORAL at 22:23

## 2020-04-25 RX ADMIN — DIPHENHYDRAMINE HYDROCHLORIDE 25 MG: 25 CAPSULE ORAL at 17:23

## 2020-04-25 RX ADMIN — METFORMIN HYDROCHLORIDE 500 MG: 500 TABLET ORAL at 17:23

## 2020-04-25 RX ADMIN — ACETAMINOPHEN 650 MG: 325 TABLET, FILM COATED ORAL at 05:44

## 2020-04-25 RX ADMIN — ACETAMINOPHEN 650 MG: 325 TABLET, FILM COATED ORAL at 22:23

## 2020-04-25 RX ADMIN — ACETAMINOPHEN 650 MG: 325 TABLET, FILM COATED ORAL at 14:38

## 2020-04-25 RX ADMIN — LISINOPRIL 40 MG: 20 TABLET ORAL at 08:25

## 2020-04-25 RX ADMIN — ENOXAPARIN SODIUM 40 MG: 40 INJECTION SUBCUTANEOUS at 14:38

## 2020-04-25 NOTE — PROGRESS NOTES
Hospitalist Progress Note    Subjective:   Daily Progress Note: 2020 9:24 PM    Patient admitted 17 with acute right side embolic stroke with left side residual weakness and left facial droop. CT brain on admission with indeterminate infarctions within the left corona radiata/caudate.  CTA of the head and neck with stenosis at the distal segment of the right  Vertebral artery and multifocal stenosis in the P2 segment of the left posterior cerebral artery.  MRI brain with acute to early subacute infarcts in the left cerebellar hemisphere in the periventricular deep left frontoparietal white matter and likely additional areas of restricted diffusion in the right paramedian yariel. Echo with + PFO, on statin and ASA.  Will need event monitor on discharge and if no arrhythmia, PFO closure recommended.  Wife informs CM she does not want patient at home, they were planning to separate prior to this stroke.  CM attempting  to place in STR, medicaid pending.    :  Speech remains slightly slurred, SLP signing off.      4/10:  Ma.3. Replaced   :  Resting quietly in bed.  Continues to wait for placement.  Good participation with PT/OT. Amanda Records a little use of right hand, still unable to . Transitioned from lantus and humalog SSI to metformin bid in January of this year.  SSI ac and HS added 4/15.  Teaching done.     :  DM management in, A1C now 6.9.  Spoke with DM management.  Glucose , this am 117.  Discontinuing SSI and qid fingersticks.      : In usual good spirits.  Fatigues easily.          ADDITIONAL HISTORY:  DM II, HTN, pancreatitis,  stroke, COPD, dementia, CAD, PUD,  Hernia repair.       Current Facility-Administered Medications   Medication Dose Route Frequency    metFORMIN (GLUCOPHAGE) tablet 500 mg  500 mg Oral BID WITH MEALS    magnesium oxide (MAG-OX) tablet 400 mg  400 mg Oral DAILY    acetaminophen (TYLENOL) tablet 650 mg  650 mg Oral Q4H PRN    diphenhydrAMINE (BENADRYL) capsule 25 mg  25 mg Oral Q6H PRN    acetaminophen (TYLENOL) tablet 650 mg  650 mg Oral Q8H    lip protectant (BLISTEX) ointment 1 Each  1 Each Topical PRN    metoprolol succinate (TOPROL-XL) XL tablet 25 mg  25 mg Oral DAILY    polyethylene glycol (MIRALAX) packet 17 g  17 g Oral DAILY PRN    guaiFENesin (ROBITUSSIN) 100 mg/5 mL oral liquid 100 mg  100 mg Oral Q4H PRN    hydrALAZINE (APRESOLINE) 20 mg/mL injection 20 mg  20 mg IntraVENous Q4H PRN    atorvastatin (LIPITOR) tablet 80 mg  80 mg Oral QHS    lisinopril (PRINIVIL, ZESTRIL) tablet 40 mg  40 mg Oral DAILY    sodium chloride (NS) flush 5-40 mL  5-40 mL IntraVENous PRN    ondansetron (ZOFRAN) injection 4 mg  4 mg IntraVENous Q4H PRN    aspirin chewable tablet 81 mg  81 mg Oral DAILY    enoxaparin (LOVENOX) injection 40 mg  40 mg SubCUTAneous Q24H    dextrose 40% (GLUTOSE) oral gel 1 Tube  15 g Oral PRN    glucagon (GLUCAGEN) injection 1 mg  1 mg IntraMUSCular PRN    dextrose (D50W) injection syrg 12.5-25 g  25-50 mL IntraVENous PRN        Review of Systems  A comprehensive review of systems was negative except for that written in the HPI. Objective:     Visit Vitals  /70 (BP 1 Location: Right arm, BP Patient Position: At rest)   Pulse 80   Temp 98.4 °F (36.9 °C)   Resp 17   Ht 5' 6\" (1.676 m)   Wt 79.8 kg (175 lb 14.4 oz)   SpO2 96%   BMI 28.39 kg/m²      O2 Device: Room air    Temp (24hrs), Av °F (36.7 °C), Min:97.6 °F (36.4 °C), Max:98.4 °F (36.9 °C)    701 -  1900  In: 360 [P.O.:360]  Out: -     General appearance: Sitting up in chair. No complaints. In riented and alert, cooperative, denies need or new issues.  In good spirits.    Head: Normocephalic, without obvious abnormality, atraumatic  Eyes: conjunctivae/corneas clear.  PERRL  Throat: Continued mild left facial weakness.  Lips, mucosa, and tongue normal. Teeth and gums normal  Neck: supple, symmetrical, trachea midline, no JVD  Lungs: clear to auscultation bilaterally  Heart: regular rate and rhythm, S1, S2 normal, no murmur, click, rub or gallop  Abdomen: soft, non-tender. Bowel sounds normal. No masses,  no organomegaly  Extremities: Right lower and upper extremities normal, atraumatic, no cyanosis or edema.  Persistent left side residual weakness, both upper and lower extremities on left weak. Skin: Skin color, texture, turgor normal. No rashes or lesions  Neurologic: As above.     Additional comments: Notes,orders, test results, vitals reviewed    Data Review  12/12/19:  CT HEAD:  Age indeterminate infarctions within the left corona radiata/caudate. Mild chronic small vessel ischemic changes and areas of remote infarction as described.  Probable partial volume averaging the region of the left thalamus rather than intraparenchymal hemorrhage.     12/12/19:  CTA HEAD AND NECK:      Stenosis at the distal segment of the right vertebral artery and multifocal  stenosis in the P2 segment of the left posterior cerebral artery.     12/12/19:  MRI BRAIN:  Acute to early subacute infarcts in the left cerebellar hemisphere, in the periventricular deep left frontoparietal white matter and likely additional areas of restricted diffusion in the right paramedian yariel. Old lacunar infarcts in the corpus striatum and periventricular white matter as well as within the left corona radiata.     1/8/20:  SWALLOW FUNCTION VIDEO: Small quantity aspiration with straw sips of thin liquids.     LAST LABS:       Ref.  Range 4/21/2020 04:19   RBC Latest Ref Range: 4.23 - 5.6 M/uL 3.94 (L)   HGB Latest Ref Range: 13.6 - 17.2 g/dL 10.4 (L)   HCT Latest Ref Range: 41.1 - 50.3 % 33.2 (L)   MCV Latest Ref Range: 79.6 - 97.8 FL 84.3   MCH Latest Ref Range: 26.1 - 32.9 PG 26.4   MCHC Latest Ref Range: 31.4 - 35.0 g/dL 31.3 (L)   RDW Latest Ref Range: 11.9 - 14.6 % 16.4 (H)   PLATELET Latest Ref Range: 150 - 450 K/uL 170   MPV Latest Ref Range: 9.4 - 12.3 FL 12.0   NEUTROPHILS Latest Ref Range: 43 - 78 % 42 (L)   LYMPHOCYTES Latest Ref Range: 13 - 44 % 49 (H)   MONOCYTES Latest Ref Range: 4.0 - 12.0 % 7   EOSINOPHILS Latest Ref Range: 0.5 - 7.8 % 2   BASOPHILS Latest Ref Range: 0.0 - 2.0 % 1   IMMATURE GRANULOCYTES Latest Ref Range: 0.0 - 5.0 % 0   DF Latest Units:   AUTOMATED   ABSOLUTE NRBC Latest Ref Range: 0.0 - 0.2 K/uL 0.00   ABS. NEUTROPHILS Latest Ref Range: 1.7 - 8.2 K/UL 2.2   ABS. IMM. GRANS. Latest Ref Range: 0.0 - 0.5 K/UL 0.0   ABS. LYMPHOCYTES Latest Ref Range: 0.5 - 4.6 K/UL 2.5   ABS. MONOCYTES Latest Ref Range: 0.1 - 1.3 K/UL 0.3   ABS. EOSINOPHILS Latest Ref Range: 0.0 - 0.8 K/UL 0.1   ABS. BASOPHILS Latest Ref Range: 0.0 - 0.2 K/UL 0.0   Sodium Latest Ref Range: 136 - 145 mmol/L 142   Potassium Latest Ref Range: 3.5 - 5.1 mmol/L 4.0   Chloride Latest Ref Range: 98 - 107 mmol/L 109 (H)   CO2 Latest Ref Range: 21 - 32 mmol/L 25   Anion gap Latest Ref Range: 7 - 16 mmol/L 8   Glucose Latest Ref Range: 65 - 100 mg/dL 87   BUN Latest Ref Range: 6 - 23 MG/DL 20   Creatinine Latest Ref Range: 0.8 - 1.5 MG/DL 1.01   Calcium Latest Ref Range: 8.3 - 10.4 MG/DL 9.3   Magnesium Latest Ref Range: 1.8 - 2.4 mg/dL 1.6 (L)   GFR est non-AA Latest Ref Range: >60 ml/min/1.73m2 >60   GFR est AA Latest Ref Range: >60 ml/min/1.73m2 >60   Bilirubin, total Latest Ref Range: 0.2 - 1.1 MG/DL 0.5   Protein, total Latest Ref Range: 6.3 - 8.2 g/dL 6.6   Albumin Latest Ref Range: 3.5 - 5.0 g/dL 2.7 (L)   Globulin Latest Ref Range: 2.3 - 3.5 g/dL 3.9 (H)   A-G Ratio Latest Ref Range: 1.2 - 3.5   0.7 (L)   ALT (SGPT) Latest Ref Range: 12 - 65 U/L 18   AST Latest Ref Range: 15 - 37 U/L 15   Alk. phosphatase Latest Ref Range: 50 - 136 U/L 67         12/12/19:  CT HEAD:  Age indeterminate infarctions within the left corona radiata/caudate.  Mild chronic small vessel ischemic changes and areas of remote infarction as described.  Probable partial volume averaging the region of the left thalamus rather than intraparenchymal hemorrhage.     12/12/19:  CTA HEAD AND NECK:      Stenosis at the distal segment of the right vertebral artery and multifocal  stenosis in the P2 segment of the left posterior cerebral artery.     12/12/19:  MRI BRAIN:  Acute to early subacute infarcts in the left cerebellar hemisphere, in the periventricular deep left frontoparietal white matter and likely additional areas of restricted diffusion in the right paramedian yariel. Old lacunar infarcts in the corpus striatum and periventricular white matter as well as within the left corona radiata.     1/8/20:  SWALLOW FUNCTION VIDEO: Small quantity aspiration with straw sips of thin liquids.     Assessment/Plan:   Acute to early subacute infarcts in the left cerebellar hemisphere, in the periventricular deep left frontoparietal white matter and likely additional areas of restricted diffusion in the right paramedian yariel.  Old lacunar infarcts also.                  Making good progress with PT/OT                Speech and swallowing improved to the extent SLP signed                off                              Pending placement      Dysarthria due to stroke:  Improved immensely              SLP releasing 4/14 due to improved without need for further  intervention      Difficult placement with marital discord prior to stroke:  Wife does not want him at home:  CM working on tirelessly              Now issues obtaining MR for [facility] application      Hypertension:  Continue lisinopril, toprol XL     IDDM II:  Continue glucophage, diabetic diet. A1C: now 6.9, will discontinue SSI ac and HS.                DM education and management      Arrhythmia:  On beta blocker     PFO 12/17/2019       Hypomagnesemia:  Replace and recheck      Last labs:  4/21/20    Care Plan discussed with: Patient and Nurse    Signed By: Ami Muniz NP     April 24, 2020

## 2020-04-25 NOTE — PROGRESS NOTES
Problem: Patient Education: Go to Patient Education Activity  Goal: Patient/Family Education  Outcome: Progressing Towards Goal     Problem: Pressure Injury - Risk of  Goal: *Prevention of pressure injury  Description: Document Dewey Scale and appropriate interventions in the flowsheet. Outcome: Progressing Towards Goal  Note: Pressure Injury Interventions:  Sensory Interventions: Assess need for specialty bed    Moisture Interventions: Absorbent underpads    Activity Interventions: Pressure redistribution bed/mattress(bed type), Increase time out of bed    Mobility Interventions: HOB 30 degrees or less, Pressure redistribution bed/mattress (bed type)    Nutrition Interventions: Offer support with meals,snacks and hydration    Friction and Shear Interventions: HOB 30 degrees or less                Problem: Patient Education: Go to Patient Education Activity  Goal: Patient/Family Education  Outcome: Progressing Towards Goal     Problem: Falls - Risk of  Goal: *Absence of Falls  Description: Document Jeanne Fall Risk and appropriate interventions in the flowsheet. Outcome: Progressing Towards Goal  Note: Fall Risk Interventions:  Mobility Interventions: Bed/chair exit alarm    Mentation Interventions: Bed/chair exit alarm    Medication Interventions: Bed/chair exit alarm    Elimination Interventions: Call light in reach    History of Falls Interventions: Bed/chair exit alarm         Problem: Patient Education: Go to Patient Education Activity  Goal: Patient/Family Education  Outcome: Progressing Towards Goal     Problem: Diabetes Self-Management  Goal: *Disease process and treatment process  Description: Define diabetes and identify own type of diabetes; list 3 options for treating diabetes. Outcome: Progressing Towards Goal  Goal: *Incorporating nutritional management into lifestyle  Description: Describe effect of type, amount and timing of food on blood glucose; list 3 methods for planning meals.   Outcome: Progressing Towards Goal  Goal: *Incorporating physical activity into lifestyle  Description: State effect of exercise on blood glucose levels. Outcome: Progressing Towards Goal  Goal: *Developing strategies to promote health/change behavior  Description: Define the ABC's of diabetes; identify appropriate screenings, schedule and personal plan for screenings. Outcome: Progressing Towards Goal  Goal: *Using medications safely  Description: State effect of diabetes medications on diabetes; name diabetes medication taking, action and side effects. Outcome: Progressing Towards Goal  Goal: *Monitoring blood glucose, interpreting and using results  Description: Identify recommended blood glucose targets  and personal targets. Outcome: Progressing Towards Goal  Goal: *Prevention, detection, treatment of acute complications  Description: List symptoms of hyper- and hypoglycemia; describe how to treat low blood sugar and actions for lowering  high blood glucose level. Outcome: Progressing Towards Goal  Goal: *Prevention, detection and treatment of chronic complications  Description: Define the natural course of diabetes and describe the relationship of blood glucose levels to long term complications of diabetes.   Outcome: Progressing Towards Goal  Goal: *Developing strategies to address psychosocial issues  Description: Describe feelings about living with diabetes; identify support needed and support network  Outcome: Progressing Towards Goal     Problem: Patient Education: Go to Patient Education Activity  Goal: Patient/Family Education  Outcome: Progressing Towards Goal     Problem: Patient Education: Go to Patient Education Activity  Goal: Patient/Family Education  Outcome: Progressing Towards Goal     Problem: Nutrition Deficit  Goal: *Optimize nutritional status  Outcome: Progressing Towards Goal     Problem: Patient Education: Go to Patient Education Activity  Goal: Patient/Family Education  Outcome: Progressing Towards Goal     Problem: General Medical Care Plan  Goal: *Vital signs within specified parameters  Outcome: Progressing Towards Goal  Goal: *Labs within defined limits  Outcome: Progressing Towards Goal  Goal: *Absence of infection signs and symptoms  Description: Wash hand more often   Outcome: Progressing Towards Goal  Goal: *Optimal pain control at patient's stated goal  Outcome: Progressing Towards Goal  Goal: *Skin integrity maintained  Outcome: Progressing Towards Goal  Goal: *Fluid volume balance  Outcome: Progressing Towards Goal  Goal: *Optimize nutritional status  Outcome: Progressing Towards Goal  Goal: *Anxiety reduced or absent  Outcome: Progressing Towards Goal  Goal: *Progressive mobility and function (eg: ADL's)  Outcome: Progressing Towards Goal     Problem: Patient Education: Go to Patient Education Activity  Goal: Patient/Family Education  Outcome: Progressing Towards Goal     Problem: Patient Education: Go to Patient Education Activity  Goal: Patient/Family Education  Outcome: Progressing Towards Goal     Problem: Patient Education: Go to Patient Education Activity  Goal: Patient/Family Education  Outcome: Progressing Towards Goal     Problem: Patient Education: Go to Patient Education Activity  Goal: Patient/Family Education  Outcome: Progressing Towards Goal     Problem: Ischemic Stroke: Discharge Outcomes  Goal: *Verbalizes anxiety and depression are reduced or absent  Outcome: Progressing Towards Goal  Goal: *Verbalize understanding of risk factor modification(Stroke Metric)  Outcome: Progressing Towards Goal  Goal: *Hemodynamically stable  Outcome: Progressing Towards Goal  Goal: *Absence of aspiration pneumonia  Outcome: Progressing Towards Goal  Goal: *Aware of needed dietary changes  Outcome: Progressing Towards Goal  Goal: *Verbalize understanding of prescribed medications including anti-coagulants, anti-lipid, and/or anti-platelets(Stroke Metric)  Outcome: Progressing Towards Goal  Goal: *Tolerating diet  Outcome: Progressing Towards Goal  Goal: *Aware of follow-up diagnostics related to anticoagulants  Outcome: Progressing Towards Goal  Goal: *Ability to perform ADLs and demonstrates progressive mobility and function  Outcome: Progressing Towards Goal  Goal: *Absence of DVT(Stroke Metric)  Outcome: Progressing Towards Goal  Goal: *Absence of aspiration  Outcome: Progressing Towards Goal  Goal: *Optimal pain control at patient's stated goal  Outcome: Progressing Towards Goal  Goal: *Home safety concerns addressed  Outcome: Progressing Towards Goal  Goal: *Describes available resources and support systems  Outcome: Progressing Towards Goal  Goal: *Verbalizes understanding of activation of EMS(911) for stroke symptoms(Stroke Metric)  Outcome: Progressing Towards Goal  Goal: *Understands and describes signs and symptoms to report to providers(Stroke Metric)  Outcome: Progressing Towards Goal  Goal: *Neurolgocially stable (absence of additional neurological deficits)  Outcome: Progressing Towards Goal  Goal: *Verbalizes importance of follow-up with primary care physician(Stroke Metric)  Outcome: Progressing Towards Goal  Goal: *Smoking cessation discussed,if applicable(Stroke Metric)  Outcome: Progressing Towards Goal  Goal: *Depression screening completed(Stroke Metric)  Outcome: Progressing Towards Goal     Problem: Pain  Goal: *Control of Pain  Outcome: Progressing Towards Goal     Problem: Patient Education: Go to Patient Education Activity  Goal: Patient/Family Education  Outcome: Progressing Towards Goal     Problem: Patient Education: Go to Patient Education Activity  Goal: Patient/Family Education  Outcome: Progressing Towards Goal

## 2020-04-25 NOTE — PROGRESS NOTES
Hospitalist Progress Note    Subjective:   Daily Progress Note: 2020 9:05 AM    Patient admitted 17 with acute right side embolic stroke with left side residual weakness and left facial droop. CT brain on admission with indeterminate infarctions within the left corona radiata/caudate.  CTA of the head and neck with stenosis at the distal segment of the right  Vertebral artery and multifocal stenosis in the P2 segment of the left posterior cerebral artery.  MRI brain with acute to early subacute infarcts in the left cerebellar hemisphere in the periventricular deep left frontoparietal white matter and likely additional areas of restricted diffusion in the right paramedian yariel. Echo with + PFO, on statin and ASA.  Will need event monitor on discharge and if no arrhythmia, PFO closure recommended.  Wife informs CM she does not want patient at home, they were planning to separate prior to this stroke.  CM attempting  to place in STR, medicaid pending.    :  Speech remains slightly slurred, SLP signing off.      4/10:  Ma.3. Replaced   :  Resting quietly in bed.  Continues to wait for placement.  Good participation with PT/OT. Keny Fanning a little use of right hand, still unable to . Transitioned from lantus and humalog SSI to metformin bid in January of this year.  SSI ac and HS added 4/15.  Teaching done.     :  DM management in, A1C now 6.9.  Spoke with DM management.  Glucose , this am 117.  Discontinuing SSI and qid fingersticks.      : Always motivated, cooperative and upbeat. No complaints.   Continues to tire easily.       ADDITIONAL HISTORY:  DM II, HTN, pancreatitis,  stroke, COPD, dementia, CAD, PUD,  Hernia repair.      Current Facility-Administered Medications   Medication Dose Route Frequency    metFORMIN (GLUCOPHAGE) tablet 500 mg  500 mg Oral BID WITH MEALS    magnesium oxide (MAG-OX) tablet 400 mg  400 mg Oral DAILY    acetaminophen (TYLENOL) tablet 650 mg  650 mg Oral Q4H PRN    diphenhydrAMINE (BENADRYL) capsule 25 mg  25 mg Oral Q6H PRN    acetaminophen (TYLENOL) tablet 650 mg  650 mg Oral Q8H    lip protectant (BLISTEX) ointment 1 Each  1 Each Topical PRN    metoprolol succinate (TOPROL-XL) XL tablet 25 mg  25 mg Oral DAILY    polyethylene glycol (MIRALAX) packet 17 g  17 g Oral DAILY PRN    guaiFENesin (ROBITUSSIN) 100 mg/5 mL oral liquid 100 mg  100 mg Oral Q4H PRN    hydrALAZINE (APRESOLINE) 20 mg/mL injection 20 mg  20 mg IntraVENous Q4H PRN    atorvastatin (LIPITOR) tablet 80 mg  80 mg Oral QHS    lisinopril (PRINIVIL, ZESTRIL) tablet 40 mg  40 mg Oral DAILY    sodium chloride (NS) flush 5-40 mL  5-40 mL IntraVENous PRN    ondansetron (ZOFRAN) injection 4 mg  4 mg IntraVENous Q4H PRN    aspirin chewable tablet 81 mg  81 mg Oral DAILY    enoxaparin (LOVENOX) injection 40 mg  40 mg SubCUTAneous Q24H    dextrose 40% (GLUTOSE) oral gel 1 Tube  15 g Oral PRN    glucagon (GLUCAGEN) injection 1 mg  1 mg IntraMUSCular PRN    dextrose (D50W) injection syrg 12.5-25 g  25-50 mL IntraVENous PRN      Review of Systems  A comprehensive review of systems was negative except for that written in the HPI. Objective:     Visit Vitals  /81 (BP 1 Location: Right arm, BP Patient Position: At rest)   Pulse 67   Temp 98 °F (36.7 °C)   Resp 17   Ht 5' 6\" (1.676 m)   Wt 79.8 kg (175 lb 14.4 oz)   SpO2 95%   BMI 28.39 kg/m²      O2 Device: Room air    Temp (24hrs), Av °F (36.7 °C), Min:97.6 °F (36.4 °C), Max:98.4 °F (36.9 °C)    General appearance: Sitting up in chair. No complaints.  Oriented and alert, cooperative, denies need or new issues.  In good spirits as usual.    Head: Normocephalic, without obvious abnormality, atraumatic  Eyes: conjunctivae/corneas clear.  PERRL  Throat: Continued mild left facial weakness.  Lips, mucosa, and tongue normal. Teeth and gums normal  Neck: supple, symmetrical, trachea midline, no JVD  Lungs: clear to auscultation bilaterally  Heart: regular rate and rhythm, S1, S2 normal, no murmur, click, rub or gallop  Abdomen: soft, non-tender. Bowel sounds normal. No masses,  no organomegaly  Extremities: Right lower and upper extremities normal, atraumatic, no cyanosis or edema.  Persistent left side residual weakness, both upper and lower extremities on left weak. Skin: Skin color, texture, turgor normal. No rashes or lesions  Neurologic: As above.     Additional comments: Notes,orders,vitals reviewed    Data Review  12/12/19:  CT HEAD:  Age indeterminate infarctions within the left corona radiata/caudate. Mild chronic small vessel ischemic changes and areas of remote infarction as described.  Probable partial volume averaging the region of the left thalamus rather than intraparenchymal hemorrhage.     12/12/19:  CTA HEAD AND NECK:      Stenosis at the distal segment of the right vertebral artery and multifocal  stenosis in the P2 segment of the left posterior cerebral artery.     12/12/19:  MRI BRAIN:  Acute to early subacute infarcts in the left cerebellar hemisphere, in the periventricular deep left frontoparietal white matter and likely additional areas of restricted diffusion in the right paramedian yariel. Old lacunar infarcts in the corpus striatum and periventricular white matter as well as within the left corona radiata.     1/8/20:  SWALLOW FUNCTION VIDEO: Small quantity aspiration with straw sips of thin liquids.     LAST LABS:       Ref.  Range 4/21/2020 04:19   RBC Latest Ref Range: 4.23 - 5.6 M/uL 3.94 (L)   HGB Latest Ref Range: 13.6 - 17.2 g/dL 10.4 (L)   HCT Latest Ref Range: 41.1 - 50.3 % 33.2 (L)   MCV Latest Ref Range: 79.6 - 97.8 FL 84.3   MCH Latest Ref Range: 26.1 - 32.9 PG 26.4   MCHC Latest Ref Range: 31.4 - 35.0 g/dL 31.3 (L)   RDW Latest Ref Range: 11.9 - 14.6 % 16.4 (H)   PLATELET Latest Ref Range: 150 - 450 K/uL 170   MPV Latest Ref Range: 9.4 - 12.3 FL 12.0   NEUTROPHILS Latest Ref Range: 43 - 78 % 42 (L) LYMPHOCYTES Latest Ref Range: 13 - 44 % 49 (H)   MONOCYTES Latest Ref Range: 4.0 - 12.0 % 7   EOSINOPHILS Latest Ref Range: 0.5 - 7.8 % 2   BASOPHILS Latest Ref Range: 0.0 - 2.0 % 1   IMMATURE GRANULOCYTES Latest Ref Range: 0.0 - 5.0 % 0   DF Latest Units:   AUTOMATED   ABSOLUTE NRBC Latest Ref Range: 0.0 - 0.2 K/uL 0.00   ABS. NEUTROPHILS Latest Ref Range: 1.7 - 8.2 K/UL 2.2   ABS. IMM. GRANS. Latest Ref Range: 0.0 - 0.5 K/UL 0.0   ABS. LYMPHOCYTES Latest Ref Range: 0.5 - 4.6 K/UL 2.5   ABS. MONOCYTES Latest Ref Range: 0.1 - 1.3 K/UL 0.3   ABS. EOSINOPHILS Latest Ref Range: 0.0 - 0.8 K/UL 0.1   ABS. BASOPHILS Latest Ref Range: 0.0 - 0.2 K/UL 0.0   Sodium Latest Ref Range: 136 - 145 mmol/L 142   Potassium Latest Ref Range: 3.5 - 5.1 mmol/L 4.0   Chloride Latest Ref Range: 98 - 107 mmol/L 109 (H)   CO2 Latest Ref Range: 21 - 32 mmol/L 25   Anion gap Latest Ref Range: 7 - 16 mmol/L 8   Glucose Latest Ref Range: 65 - 100 mg/dL 87   BUN Latest Ref Range: 6 - 23 MG/DL 20   Creatinine Latest Ref Range: 0.8 - 1.5 MG/DL 1.01   Calcium Latest Ref Range: 8.3 - 10.4 MG/DL 9.3   Magnesium Latest Ref Range: 1.8 - 2.4 mg/dL 1.6 (L)   GFR est non-AA Latest Ref Range: >60 ml/min/1.73m2 >60   GFR est AA Latest Ref Range: >60 ml/min/1.73m2 >60   Bilirubin, total Latest Ref Range: 0.2 - 1.1 MG/DL 0.5   Protein, total Latest Ref Range: 6.3 - 8.2 g/dL 6.6   Albumin Latest Ref Range: 3.5 - 5.0 g/dL 2.7 (L)   Globulin Latest Ref Range: 2.3 - 3.5 g/dL 3.9 (H)   A-G Ratio Latest Ref Range: 1.2 - 3.5   0.7 (L)   ALT (SGPT) Latest Ref Range: 12 - 65 U/L 18   AST Latest Ref Range: 15 - 37 U/L 15   Alk. phosphatase Latest Ref Range: 50 - 136 U/L 67         12/12/19:  CT HEAD:  Age indeterminate infarctions within the left corona radiata/caudate.  Mild chronic small vessel ischemic changes and areas of remote infarction as described.  Probable partial volume averaging the region of the left thalamus rather than intraparenchymal hemorrhage.     12/12/19:  CTA HEAD AND NECK:      Stenosis at the distal segment of the right vertebral artery and multifocal  stenosis in the P2 segment of the left posterior cerebral artery.     12/12/19:  MRI BRAIN:  Acute to early subacute infarcts in the left cerebellar hemisphere, in the periventricular deep left frontoparietal white matter and likely additional areas of restricted diffusion in the right paramedian yariel. Old lacunar infarcts in the corpus striatum and periventricular white matter as well as within the left corona radiata.     1/8/20:  SWALLOW FUNCTION VIDEO: Small quantity aspiration with straw sips of thin liquids.     Assessment/Plan:   Acute to early subacute infarcts in the left cerebellar hemisphere, in the periventricular deep left frontoparietal white matter and likely additional areas of restricted diffusion in the right paramedian yariel.  Old lacunar infarcts also.                  Making good progress with PT/OT                Speech and swallowing improved to the extent SLP signed                CE                              Pending placement      Dysarthria due to stroke:  Improved immensely              SLP releasing 4/14 due to improved without need for further intervention      Difficult placement with marital discord prior to stroke:  Wife does not want him at home:  CM working on tirelessly              Now issues obtaining MR for [facility] application      Hypertension:  Continue lisinopril, toprol XL     IDDM II:  Continue glucophage, diabetic diet.  A1C: now 6.9, will discontinue SSI ac and HS 4/22/20              DM education and management      Arrhythmia:  On beta blocker     PFO 12/17/2019       Hypomagnesemia:  Replaced    Care Plan discussed with: Patient and Nurse    Signed By: Heber Castellanos NP     April 25, 2020

## 2020-04-25 NOTE — PROGRESS NOTES
Problem: Mobility Impaired (Adult and Pediatric)  Goal: *Acute Goals and Plan of Care  Description  GOALS UPDATED ON RE-ASSESSMENT 4/19/2020 (ALL GOALS STILL APPLY) :  1. Patient will perform bed mobility with supervision and 0 verbal cues within 7 treatment days. GOAL MET: 4/25/20  2. Patient will perform transfer bed to chair with SUPERVISION within 7 treatment days. 3. Patient will demonstrate fair+ dynamic balance throughout stance phase on L LE within 7 treatment days. 4. Patient will require STAND BY ASSIST throughout swing phase on (to advance) L LE within 7 treatment days. 5. Patient demonstrate 3+/5 strength in L LE hip flexion, hip abduction, and hip adduction within 7 treatment days. 6. Patient will ambulate 200ft+ with STAND BY ASSIST and least retrictive assistive device within 7 treatment days. 7. Patient will perform wheelchair mobility 150 ft with modified independence within 7 treatment days. 8. Mr. Jackie Anderson will be independent with wheelchair locking/unlocking mechanism as well as leg rest manipulation within 7 days to improve safety and independence. 9. Mr. Jackie Anderson will don/doff LUE sling for protection of L shoulder during transfers/gait within 7 treatment days. 10) Pt's spouse / caregiver comfortable & safe to assist pt with all functional mobility. 11) pt able to go up & down 12 steps with min A & bilateral rails.         PHYSICAL THERAPY: Daily Note and PM 4/25/2020  INPATIENT: PT Visit Days : 7  Payor: [id number] / Plan: [address] [place] [address] Avenue / Product Type: Medicaid /       NAME/AGE/GENDER: Priti Kelly is a 53 y.o. male   PRIMARY DIAGNOSIS: Acute ischemic stroke ([address] [I63.9]  Cerebrovascular accident (CVA) due to embolism ([address] [M60.4] Acute embolic stroke ([address] Acute embolic stroke ([address]       ICD-10: Treatment Diagnosis:   · Difficulty in walking, Not elsewhere classified (R26.2)  · Hemiplegia and hemiparesis following cerebral infarction affecting left non-dominant side (H08.132)   Precaution/Allergies:  Patient has no known allergies. ASSESSMENT:     Mr. eHnriquez is a 53year old admitted R MCA CVA.  Patient was supine upon contact and agreeable to PT. Patient performs supine to sit with supervision and additional time. Patient dons sling on LUE with SBA, additional time, and cues for technique. Patient transfers to standing with SBA-CGA. Patient then ambulates 400' with luke walker at a slow, steady pace and verbal cues for gait mechanics maintaining foot forward, increasing knee flexion to prevent foot drag, and safety/fatigue awareness. The patient took one seated rest break during the 400'. The patient then performed w/c mobility for 100' with supervision and cues for attention to navigation. The patient then returned to the room and performed SPT to the bed with no additional AD and SBA/CGA. He performed sit to supine with supervision and no cueing. The patient was then positioned for comfort with needs in reach. Overall the patient is making slow steady progress toward therapy goals as indicated by decreased assistance levels. Will continue skilled PT treatment as patient is still below functional baseline. This section established at most recent assessment   PROBLEM LIST (Impairments causing functional limitations):  1. Decreased Strength  2. Decreased ADL/Functional Activities  3. Decreased Transfer Abilities  4. Decreased Ambulation Ability/Technique  5. Decreased Balance  6. Increased Pain  7. Decreased Activity Tolerance  8. Increased Fatigue  9. Decreased Flexibility/Joint Mobility   INTERVENTIONS PLANNED: (Benefits and precautions of physical therapy have been discussed with the patient.)  1. Balance Exercise  2. Bed Mobility  3. Family Education  4. Gait Training  5. Home Exercise Program (HEP)  6. Manual Therapy  7. Neuromuscular Re-education/Strengthening  8. Range of Motion (ROM)  9.  Therapeutic Activites  10. Therapeutic Exercise/Strengthening  11. Transfer Training     TREATMENT PLAN: Frequency/Duration: 5 times a week for duration of hospital stay  Rehabilitation Potential For Stated Goals: Good     REHAB RECOMMENDATIONS (at time of discharge pending progress):    Placement: It is my opinion, based on this patient's performance to date, that Mr. Sherine Joshi may benefit from intensive therapy at an [address] [place] [address] after discharge due to a probable need for close medical supervision by a rehab physician, a probable need for multiple therapy disciplines and potential to make ongoing and sustainable functional improvement that is of practical value. .  Equipment:    TBD pending progress with therapy. HISTORY:   History of Present Injury/Illness (Reason for Referral):  Per H&P: \"Pt is a 52 y/o smoker with DM, HTN, who presented to ER with L leg and arm weakness, L facial droop, dysarthria. First noted L leg weakness late night 12/11 when he woke up to go to the bathroom. He was normal when he went to bed around 1030. Woke up this morning and had persistent weakness L leg and also now noted in L arm. EMS called. Noted to have slurred speech and L facial droop as well. Code S called in ER around 9am.  MRI with acute infarcts in L cerebellar hemisphere, deep frontoparietal white matter, and R paramedian yariel. Also noted old lacunar infarcts. No large vessel occlusion on CTA, but some stenosis noted. No hx afib, TIA, CVA. No CP, palpitations, SOB. \"  Past Medical History/Comorbidities:   Mr. Sherine Joshi  has a past medical history of Acute ischemic stroke ([address] (12/12/2019), Acute pancreatitis (11/19/2014), Cerebrovascular accident (CVA) due to embolism ([address] (12/12/2019), Diabetes ([address] (2002), Diabetes ([address] Diabetes mellitus, and Hypertension.  He also has no past medical history of Arthritis, Asthma, Autoimmune disease ([address] CAD (coronary artery disease), Cancer ([address] Chronic kidney disease, COPD, Dementia, Dementia ([facility] Heart failure ([facility] Ill-defined condition, Infectious disease, Liver disease, Other ill-defined conditions(799.89), Psychiatric disorder, PUD (peptic ulcer disease), Seizures ([facility] or Sleep disorder. Mr. Karlos Alvarenga  has a past surgical history that includes hx hernia repair and hx orthopaedic. Social History/Living Environment:   Home Environment: Apartment  # Steps to Enter: 12  Rails to Enter: Yes  [employer] : Bilateral  One/Two Story Residence: One story  Living Alone: No  Support Systems: Spouse/Significant Other/Partner  Patient Expects to be Discharged to[de-identified] Unknown  Current DME Used/Available at Home: None  Tub or Shower Type: Tub/Shower combination  Prior Level of Function/Work/Activity:  Independent, lives with wife in 2nd story 1 level apartment. No recent falls. Number of Personal Factors/Comorbidities that affect the Plan of Care: 1-2: MODERATE COMPLEXITY   EXAMINATION:   Most Recent Physical Functioning:   Gross Assessment: left hip grossly 2/5 to 3-/5                       Balance:  Sitting: Impaired  Sitting - Static: Good (unsupported)  Sitting - Dynamic: Good (unsupported)  Standing: Impaired  Standing - Static: Good  Standing - Dynamic : Fair Bed Mobility:  Supine to Sit: Stand-by assistance  Sit to Supine: Supervision  Scooting: Supervision  Interventions: Verbal cues  Wheelchair Mobility: NT  Distance (ft): 100 feet  Level of Assistance: Supervision  Interventions: Verbal cues  Transfers:  Sit to Stand: Stand-by assistance  Stand to Sit: Stand-by assistance  Bed to Chair: Contact guard assistance;Stand-by assistance  Interventions: Verbal cues; Safety awareness training  Gait:     Base of Support: Narrowed; Shift to right  Speed/Clotilde: Slow;Delayed  Step Length: Left shortened;Right shortened  Swing Pattern: Right asymmetrical  Gait Abnormalities: Hemiplegic  Distance (ft): 400 Feet (ft)  Assistive Device: Walker luke;Gait belt  Ambulation - Level of Assistance: Stand-by assistance;Contact guard assistance  Interventions: Safety awareness training;Verbal cues      Body Structures Involved:  1. Nerves  2. Voice/Speech  3. Bones  4. Joints  5. Muscles Body Functions Affected:  1. Mental  2. Sensory/Pain  3. Neuromusculoskeletal  4. Movement Related Activities and Participation Affected:  1. General Tasks and Demands  2. Mobility  3. Self Care  4. Interpersonal Interactions and Relationships   Number of elements that affect the Plan of Care: 4+: HIGH COMPLEXITY   CLINICAL PRESENTATION:   Presentation: Evolving clinical presentation with changing clinical characteristics: MODERATE COMPLEXITY   CLINICAL DECISION MAKIN [facility] Inpatient Short Form  How much difficulty does the patient currently have. .. Unable A Lot A Little None   1. Turning over in bed (including adjusting bedclothes, sheets and blankets)? [] 1   [] 2   [] 3   [x] 4   2. Sitting down on and standing up from a chair with arms ( e.g., wheelchair, bedside commode, etc.)   [] 1   [] 2   [x] 3   [] 4   3. Moving from lying on back to sitting on the side of the bed? [] 1   [] 2   [] 3   [x] 4   How much help from another person does the patient currently need. .. Total A Lot A Little None   4. Moving to and from a bed to a chair (including a wheelchair)? [] 1   [] 2   [x] 3   [] 4   5. Need to walk in hospital room? [] 1   [] 2   [x] 3   [] 4   6. Climbing 3-5 steps with a railing? [] 1   [x] 2   [] 3   [] 4   © , Trustees of [address] [place], [place] [id number], under license to [employer]. All rights reserved      Score:  Initial: 13 Most Recent: 19 (Date: 4/10/2020)    Interpretation of Tool:  Represents activities that are increasingly more difficult (i.e. Bed mobility, Transfers, Gait).     Medical Necessity:     · Patient is expected to demonstrate progress in   · strength, range of motion, balance, coordination, and functional technique  · to   · increase independence with all mobility. · .  Reason for Services/Other Comments:  · Patient continues to require skilled intervention due to   · medical complications and mobility deficits which impact his level of function, safety, and independence as indicated above. · .   Use of outcome tool(s) and clinical judgement create a POC that gives a: Questionable prediction of patient's progress: MODERATE COMPLEXITY        TREATMENT:      Pre-treatment Symptoms/Complaints: see above  Pain: Initial: 0/10 Post Session:  0/10     Therapeutic Activity: (    40 Minutes) : Therapeutic activities including bed mobility training, transfer training, static/dynamic standing balance, ambulation on level ground with cues for gait mechanics, positioning of LLE during gait, wheel chair mobility/mechanics, and patient education  to improve mobility, strength and balance. Required minimal Safety awareness training;Verbal cues to promote static and dynamic balance in standing and promote coordination of bilateral, lower extremity(s). Braces/Orthotics/Lines/Etc:   · Sling to LUE  Treatment/Session Assessment:    · Response to Treatment:  See above  · Interdisciplinary Collaboration:   o Physical Therapy Assistant  o Registered Nurse  · After treatment position/precautions:   o Supine in bed  o Bed/Chair-wheels locked  o Bed in low position  o Call light within reach  o RN notified   · Compliance with Program/Exercises: Compliant all of the time  · Recommendations/Intent for next treatment session: \"Next visit will focus on advancements to more challenging activities and reduction in assistance provided\".     Total Treatment Duration:  PT Patient Time In/Time Out  Time In: 1507  Time Out: [address], PTA

## 2020-04-25 NOTE — PROGRESS NOTES
Reviewed notes for spiritual concerns prior to visit  Visit with patient to build rapport with . Calm  Encouraged with presence and words of hope    Patient demonstrates confidence in the care team.  Appears to be coping with illness.     No physical contact with patient per guidelines  Assurance of ongoing prayers      Madina Barkley,  Staff   C: [phone]  /  [email or web address]

## 2020-04-26 PROCEDURE — 74011250637 HC RX REV CODE- 250/637: Performed by: NURSE PRACTITIONER

## 2020-04-26 PROCEDURE — 65270000029 HC RM PRIVATE

## 2020-04-26 PROCEDURE — 74011250637 HC RX REV CODE- 250/637: Performed by: INTERNAL MEDICINE

## 2020-04-26 PROCEDURE — 74011250636 HC RX REV CODE- 250/636: Performed by: INTERNAL MEDICINE

## 2020-04-26 PROCEDURE — 74011250637 HC RX REV CODE- 250/637: Performed by: HOSPITALIST

## 2020-04-26 RX ADMIN — METFORMIN HYDROCHLORIDE 500 MG: 500 TABLET ORAL at 07:55

## 2020-04-26 RX ADMIN — Medication 10 ML: at 06:14

## 2020-04-26 RX ADMIN — METOPROLOL SUCCINATE 25 MG: 25 TABLET, FILM COATED, EXTENDED RELEASE ORAL at 07:56

## 2020-04-26 RX ADMIN — ACETAMINOPHEN 650 MG: 325 TABLET, FILM COATED ORAL at 06:13

## 2020-04-26 RX ADMIN — ENOXAPARIN SODIUM 40 MG: 40 INJECTION SUBCUTANEOUS at 14:51

## 2020-04-26 RX ADMIN — ACETAMINOPHEN 650 MG: 325 TABLET, FILM COATED ORAL at 21:42

## 2020-04-26 RX ADMIN — Medication 10 ML: at 14:51

## 2020-04-26 RX ADMIN — GUAIFENESIN 100 MG: 100 SOLUTION ORAL at 16:52

## 2020-04-26 RX ADMIN — DIPHENHYDRAMINE HYDROCHLORIDE 25 MG: 25 CAPSULE ORAL at 16:53

## 2020-04-26 RX ADMIN — LISINOPRIL 40 MG: 20 TABLET ORAL at 07:55

## 2020-04-26 RX ADMIN — ATORVASTATIN CALCIUM 80 MG: 80 TABLET, FILM COATED ORAL at 21:42

## 2020-04-26 RX ADMIN — ACETAMINOPHEN 650 MG: 325 TABLET, FILM COATED ORAL at 14:51

## 2020-04-26 RX ADMIN — METFORMIN HYDROCHLORIDE 500 MG: 500 TABLET ORAL at 16:52

## 2020-04-26 RX ADMIN — Medication 400 MG: at 07:56

## 2020-04-26 RX ADMIN — ASPIRIN 81 MG 81 MG: 81 TABLET ORAL at 07:55

## 2020-04-26 RX ADMIN — Medication 10 ML: at 21:42

## 2020-04-26 NOTE — PROGRESS NOTES
Problem: Patient Education: Go to Patient Education Activity  Goal: Patient/Family Education  Outcome: Progressing Towards Goal     Problem: Pressure Injury - Risk of  Goal: *Prevention of pressure injury  Description: Document Dewey Scale and appropriate interventions in the flowsheet. Outcome: Progressing Towards Goal  Note: Pressure Injury Interventions:  Sensory Interventions: Assess changes in LOC    Moisture Interventions: Absorbent underpads    Activity Interventions: Pressure redistribution bed/mattress(bed type)    Mobility Interventions: HOB 30 degrees or less, Pressure redistribution bed/mattress (bed type)    Nutrition Interventions: Offer support with meals,snacks and hydration    Friction and Shear Interventions: HOB 30 degrees or less                Problem: Patient Education: Go to Patient Education Activity  Goal: Patient/Family Education  Outcome: Progressing Towards Goal     Problem: Falls - Risk of  Goal: *Absence of Falls  Description: Document Jeanne Fall Risk and appropriate interventions in the flowsheet. Outcome: Progressing Towards Goal  Note: Fall Risk Interventions:  Mobility Interventions: Bed/chair exit alarm, Patient to call before getting OOB    Mentation Interventions: Bed/chair exit alarm    Medication Interventions: Bed/chair exit alarm    Elimination Interventions: Call light in reach, Elevated toilet seat    History of Falls Interventions: Bed/chair exit alarm         Problem: Patient Education: Go to Patient Education Activity  Goal: Patient/Family Education  Outcome: Progressing Towards Goal     Problem: Diabetes Self-Management  Goal: *Disease process and treatment process  Description: Define diabetes and identify own type of diabetes; list 3 options for treating diabetes.   Outcome: Progressing Towards Goal  Goal: *Incorporating nutritional management into lifestyle  Description: Describe effect of type, amount and timing of food on blood glucose; list 3 methods for planning meals. Outcome: Progressing Towards Goal  Goal: *Incorporating physical activity into lifestyle  Description: State effect of exercise on blood glucose levels. Outcome: Progressing Towards Goal  Goal: *Developing strategies to promote health/change behavior  Description: Define the ABC's of diabetes; identify appropriate screenings, schedule and personal plan for screenings. Outcome: Progressing Towards Goal  Goal: *Using medications safely  Description: State effect of diabetes medications on diabetes; name diabetes medication taking, action and side effects. Outcome: Progressing Towards Goal  Goal: *Monitoring blood glucose, interpreting and using results  Description: Identify recommended blood glucose targets  and personal targets. Outcome: Progressing Towards Goal  Goal: *Prevention, detection, treatment of acute complications  Description: List symptoms of hyper- and hypoglycemia; describe how to treat low blood sugar and actions for lowering  high blood glucose level. Outcome: Progressing Towards Goal  Goal: *Prevention, detection and treatment of chronic complications  Description: Define the natural course of diabetes and describe the relationship of blood glucose levels to long term complications of diabetes.   Outcome: Progressing Towards Goal  Goal: *Developing strategies to address psychosocial issues  Description: Describe feelings about living with diabetes; identify support needed and support network  Outcome: Progressing Towards Goal     Problem: Patient Education: Go to Patient Education Activity  Goal: Patient/Family Education  Outcome: Progressing Towards Goal     Problem: Patient Education: Go to Patient Education Activity  Goal: Patient/Family Education  Outcome: Progressing Towards Goal     Problem: Nutrition Deficit  Goal: *Optimize nutritional status  Outcome: Progressing Towards Goal     Problem: Patient Education: Go to Patient Education Activity  Goal: Patient/Family Education  Outcome: Progressing Towards Goal     Problem: General Medical Care Plan  Goal: *Vital signs within specified parameters  Outcome: Progressing Towards Goal  Goal: *Labs within defined limits  Outcome: Progressing Towards Goal  Goal: *Absence of infection signs and symptoms  Description: Wash hand more often   Outcome: Progressing Towards Goal  Goal: *Optimal pain control at patient's stated goal  Outcome: Progressing Towards Goal  Goal: *Skin integrity maintained  Outcome: Progressing Towards Goal  Goal: *Fluid volume balance  Outcome: Progressing Towards Goal  Goal: *Optimize nutritional status  Outcome: Progressing Towards Goal  Goal: *Anxiety reduced or absent  Outcome: Progressing Towards Goal  Goal: *Progressive mobility and function (eg: ADL's)  Outcome: Progressing Towards Goal     Problem: Patient Education: Go to Patient Education Activity  Goal: Patient/Family Education  Outcome: Progressing Towards Goal     Problem: Patient Education: Go to Patient Education Activity  Goal: Patient/Family Education  Outcome: Progressing Towards Goal     Problem: Patient Education: Go to Patient Education Activity  Goal: Patient/Family Education  Outcome: Progressing Towards Goal     Problem: Patient Education: Go to Patient Education Activity  Goal: Patient/Family Education  Outcome: Progressing Towards Goal     Problem: Ischemic Stroke: Discharge Outcomes  Goal: *Verbalizes anxiety and depression are reduced or absent  Outcome: Progressing Towards Goal  Goal: *Verbalize understanding of risk factor modification(Stroke Metric)  Outcome: Progressing Towards Goal  Goal: *Hemodynamically stable  Outcome: Progressing Towards Goal  Goal: *Absence of aspiration pneumonia  Outcome: Progressing Towards Goal  Goal: *Aware of needed dietary changes  Outcome: Progressing Towards Goal  Goal: *Verbalize understanding of prescribed medications including anti-coagulants, anti-lipid, and/or anti-platelets(Stroke Metric)  Outcome: Progressing Towards Goal  Goal: *Tolerating diet  Outcome: Progressing Towards Goal  Goal: *Aware of follow-up diagnostics related to anticoagulants  Outcome: Progressing Towards Goal  Goal: *Ability to perform ADLs and demonstrates progressive mobility and function  Outcome: Progressing Towards Goal  Goal: *Absence of DVT(Stroke Metric)  Outcome: Progressing Towards Goal  Goal: *Absence of aspiration  Outcome: Progressing Towards Goal  Goal: *Optimal pain control at patient's stated goal  Outcome: Progressing Towards Goal  Goal: *Home safety concerns addressed  Outcome: Progressing Towards Goal  Goal: *Describes available resources and support systems  Outcome: Progressing Towards Goal  Goal: *Verbalizes understanding of activation of EMS(911) for stroke symptoms(Stroke Metric)  Outcome: Progressing Towards Goal  Goal: *Understands and describes signs and symptoms to report to providers(Stroke Metric)  Outcome: Progressing Towards Goal  Goal: *Neurolgocially stable (absence of additional neurological deficits)  Outcome: Progressing Towards Goal  Goal: *Verbalizes importance of follow-up with primary care physician(Stroke Metric)  Outcome: Progressing Towards Goal  Goal: *Smoking cessation discussed,if applicable(Stroke Metric)  Outcome: Progressing Towards Goal  Goal: *Depression screening completed(Stroke Metric)  Outcome: Progressing Towards Goal     Problem: Pain  Goal: *Control of Pain  Outcome: Progressing Towards Goal     Problem: Patient Education: Go to Patient Education Activity  Goal: Patient/Family Education  Outcome: Progressing Towards Goal     Problem: Patient Education: Go to Patient Education Activity  Goal: Patient/Family Education  Outcome: Progressing Towards Goal

## 2020-04-26 NOTE — PROGRESS NOTES
Hospitalist Progress Note     Admit Date:  2019  9:07 AM   Name:  Maya Kelley   Age:  53 y.o.  :  1975   MRN:  [id number]   PCP:  Kimberly Fox MD  Treatment Team: Attending Provider: Omar Calvo MD; Care Manager: Marcia Sheriff [facility] [place]; Utilization Review: Valentino Woodward RN; Nurse Practitioner: Seferino De La Rosa NP; Care Manager: Timoteo Severs, RN; Hospitalist: Shamar Gonsalez NP; Charge Nurse: Jorge Mendes; Occupational Therapist: Bonnie Mcdaniel, OTD, OTR/L    Subjective:   HPI and or CC:  52 yo AA male with PMH of DM, HTN admitted  for CVA affecting numerous areas of the brain. He was placed on ASA and statin. He has remained alert and oriented x3. Some movement to right side but unable to grasp with right hand. He is currently waiting on placement and this has remained difficult due to waiting on Medicaid. :  Alert and oriented x3. Voices no complaints today. Continues to wait for placement. On RA with saturations mid 90s. Continues to wait on SSI and Medicaid approval.  He remains afebrile, no leukocytosis. NIH remains 7. Objective:     Patient Vitals for the past 24 hrs:   Temp Pulse Resp BP SpO2   20 0736 98.1 °F (36.7 °C) 73 18 128/78 93 %   20 0423 98.1 °F (36.7 °C) 76 19 124/83 95 %   20 2309 98 °F (36.7 °C) 69 19 128/81 95 %   20 2015 98.5 °F (36.9 °C) 85 19 152/68 98 %   20 1600 98.2 °F (36.8 °C) 76 19 112/65 94 %   20 1200 97.6 °F (36.4 °C) 70 18 124/84 96 %     Oxygen Therapy  O2 Sat (%): 93 % (20 0736)  Pulse via Oximetry: 75 beats per minute (20 0400)  O2 Device: Room air (20 0759)  No intake or output data in the 24 hours ending 20 0910      REVIEW OF SYSTEMS: Comprehensive ROS performed and negative except as stated in HPI. Physical Examination:  General:       No acute distress    Lungs:          CTA bilaterally.  Resp even and nonlabored  Heart:            S1S2 present without murmurs rubs gallops. RRR. No LE edema  Abdomen:    Soft, non tender, non distended. BS present  Extremities: No cyanosis. Left sided hemiparesis  Neurologic:  moves right hand and raises arm at elbow moderately, unable to squeeze my fingers left hand, mildly slurred speech.  PERRLA, strength 4/5 RUE/RLE. Data Review:  I have reviewed all labs, meds, telemetry events, and studies from the last 24 hours. No results found for this or any previous visit (from the past 24 hour(s)).      All Micro Results     None          Current Meds:  Current Facility-Administered Medications   Medication Dose Route Frequency    metFORMIN (GLUCOPHAGE) tablet 500 mg  500 mg Oral BID WITH MEALS    magnesium oxide (MAG-OX) tablet 400 mg  400 mg Oral DAILY    acetaminophen (TYLENOL) tablet 650 mg  650 mg Oral Q4H PRN    diphenhydrAMINE (BENADRYL) capsule 25 mg  25 mg Oral Q6H PRN    acetaminophen (TYLENOL) tablet 650 mg  650 mg Oral Q8H    lip protectant (BLISTEX) ointment 1 Each  1 Each Topical PRN    metoprolol succinate (TOPROL-XL) XL tablet 25 mg  25 mg Oral DAILY    polyethylene glycol (MIRALAX) packet 17 g  17 g Oral DAILY PRN    guaiFENesin (ROBITUSSIN) 100 mg/5 mL oral liquid 100 mg  100 mg Oral Q4H PRN    hydrALAZINE (APRESOLINE) 20 mg/mL injection 20 mg  20 mg IntraVENous Q4H PRN    atorvastatin (LIPITOR) tablet 80 mg  80 mg Oral QHS    lisinopril (PRINIVIL, ZESTRIL) tablet 40 mg  40 mg Oral DAILY    sodium chloride (NS) flush 5-40 mL  5-40 mL IntraVENous PRN    ondansetron (ZOFRAN) injection 4 mg  4 mg IntraVENous Q4H PRN    aspirin chewable tablet 81 mg  81 mg Oral DAILY    enoxaparin (LOVENOX) injection 40 mg  40 mg SubCUTAneous Q24H    dextrose 40% (GLUTOSE) oral gel 1 Tube  15 g Oral PRN    glucagon (GLUCAGEN) injection 1 mg  1 mg IntraMUSCular PRN    dextrose (D50W) injection syrg 12.5-25 g  25-50 mL IntraVENous PRN       Diet:  DIET NUTRITIONAL SUPPLEMENTS  DIET DIABETIC CONSISTENT CARB    Other Studies (last 24 hours):  No results found. Assessment and Plan:     Hospital Problems as of 4/26/2020 Date Reviewed: 3/3/2017          Codes Class Noted - Resolved POA    Hypomagnesemia ICD-10-CM: E83.42  ICD-9-CM: 275.2  3/7/2020 - Present Unknown        PFO (patent foramen ovale) ICD-10-CM: Q21.1  ICD-9-CM: 745.5  12/17/2019 - Present Yes        Arrhythmia ICD-10-CM: I49.9  ICD-9-CM: 427.9  12/15/2019 - Present Yes        Hyperlipemia ICD-10-CM: E78.5  ICD-9-CM: 272.4  12/15/2019 - Present Yes        * (Principal) Acute embolic stroke ([facility] [address] EHL-27-YN: I63.9  ICD-9-CM: 434.11  12/12/2019 - Present Yes        Hypertension (Chronic) ICD-10-CM: I10  ICD-9-CM: 401.9  Unknown - Present Yes        Type 2 diabetes mellitus (HCC) (Chronic) ICD-10-CM: E11.9  ICD-9-CM: 250.00  Unknown - Present Yes              A/P:    Acute embolic stroke  MRI brain showed acute to early subacute infarcts in the left cerebellar hemisphere, in the periventricular deep left frontoparietal white matter and likely additional areas of restricted diffusion in the right paramedian yariel. +PFO on echo  On ASA, statin  Will need shunt closure once discharged.     Hypomagnesemia  Resolved       Hypertension  Continue metoprolol and ACE inhibitor  Goal BP <130/80     Type 2 diabetes mellitus:    Monitor, BGL controlled        Signed:  Rohan Louis NP

## 2020-04-26 NOTE — PROGRESS NOTES
Problem: Patient Education: Go to Patient Education Activity  Goal: Patient/Family Education  Outcome: Progressing Towards Goal     Problem: Pressure Injury - Risk of  Goal: *Prevention of pressure injury  Description: Document Dewey Scale and appropriate interventions in the flowsheet. Outcome: Progressing Towards Goal  Note: Pressure Injury Interventions:  Sensory Interventions: Assess changes in LOC    Moisture Interventions: Absorbent underpads, Apply protective barrier, creams and emollients, Limit adult briefs, Maintain skin hydration (lotion/cream), Minimize layers, Moisture barrier    Activity Interventions: Increase time out of bed, Pressure redistribution bed/mattress(bed type), PT/OT evaluation    Mobility Interventions: HOB 30 degrees or less, Pressure redistribution bed/mattress (bed type), PT/OT evaluation    Nutrition Interventions: Offer support with meals,snacks and hydration    Friction and Shear Interventions: HOB 30 degrees or less                Problem: Patient Education: Go to Patient Education Activity  Goal: Patient/Family Education  Outcome: Progressing Towards Goal     Problem: Falls - Risk of  Goal: *Absence of Falls  Description: Document Jeanne Fall Risk and appropriate interventions in the flowsheet.   Outcome: Progressing Towards Goal  Note: Fall Risk Interventions:  Mobility Interventions: Bed/chair exit alarm, OT consult for ADLs, Patient to call before getting OOB, PT Consult for mobility concerns, PT Consult for assist device competence, Strengthening exercises (ROM-active/passive), Utilize walker, cane, or other assistive device, Utilize gait belt for transfers/ambulation    Mentation Interventions: Bed/chair exit alarm    Medication Interventions: Assess postural VS orthostatic hypotension, Bed/chair exit alarm, Patient to call before getting OOB, Teach patient to arise slowly    Elimination Interventions: Bed/chair exit alarm, Call light in reach, Patient to call for help with toileting needs, Toilet paper/wipes in reach    History of Falls Interventions: Bed/chair exit alarm, Investigate reason for fall         Problem: Patient Education: Go to Patient Education Activity  Goal: Patient/Family Education  Outcome: Progressing Towards Goal     Problem: Diabetes Self-Management  Goal: *Disease process and treatment process  Description: Define diabetes and identify own type of diabetes; list 3 options for treating diabetes. Outcome: Progressing Towards Goal  Goal: *Incorporating nutritional management into lifestyle  Description: Describe effect of type, amount and timing of food on blood glucose; list 3 methods for planning meals. Outcome: Progressing Towards Goal  Goal: *Incorporating physical activity into lifestyle  Description: State effect of exercise on blood glucose levels. Outcome: Progressing Towards Goal  Goal: *Developing strategies to promote health/change behavior  Description: Define the ABC's of diabetes; identify appropriate screenings, schedule and personal plan for screenings. Outcome: Progressing Towards Goal  Goal: *Using medications safely  Description: State effect of diabetes medications on diabetes; name diabetes medication taking, action and side effects. Outcome: Progressing Towards Goal  Goal: *Monitoring blood glucose, interpreting and using results  Description: Identify recommended blood glucose targets  and personal targets. Outcome: Progressing Towards Goal  Goal: *Prevention, detection, treatment of acute complications  Description: List symptoms of hyper- and hypoglycemia; describe how to treat low blood sugar and actions for lowering  high blood glucose level. Outcome: Progressing Towards Goal  Goal: *Prevention, detection and treatment of chronic complications  Description: Define the natural course of diabetes and describe the relationship of blood glucose levels to long term complications of diabetes.   Outcome: Progressing Towards Goal  Goal: *Developing strategies to address psychosocial issues  Description: Describe feelings about living with diabetes; identify support needed and support network  Outcome: Progressing Towards Goal     Problem: Patient Education: Go to Patient Education Activity  Goal: Patient/Family Education  Outcome: Progressing Towards Goal     Problem: Patient Education: Go to Patient Education Activity  Goal: Patient/Family Education  Outcome: Progressing Towards Goal     Problem: Nutrition Deficit  Goal: *Optimize nutritional status  Outcome: Progressing Towards Goal     Problem: Patient Education: Go to Patient Education Activity  Goal: Patient/Family Education  Outcome: Progressing Towards Goal     Problem: General Medical Care Plan  Goal: *Vital signs within specified parameters  Outcome: Progressing Towards Goal  Goal: *Labs within defined limits  Outcome: Progressing Towards Goal  Goal: *Absence of infection signs and symptoms  Description: Wash hand more often   Outcome: Progressing Towards Goal  Goal: *Optimal pain control at patient's stated goal  Outcome: Progressing Towards Goal  Goal: *Skin integrity maintained  Outcome: Progressing Towards Goal  Goal: *Fluid volume balance  Outcome: Progressing Towards Goal  Goal: *Optimize nutritional status  Outcome: Progressing Towards Goal  Goal: *Anxiety reduced or absent  Outcome: Progressing Towards Goal  Goal: *Progressive mobility and function (eg: ADL's)  Outcome: Progressing Towards Goal     Problem: Patient Education: Go to Patient Education Activity  Goal: Patient/Family Education  Outcome: Progressing Towards Goal     Problem: Patient Education: Go to Patient Education Activity  Goal: Patient/Family Education  Outcome: Progressing Towards Goal     Problem: Patient Education: Go to Patient Education Activity  Goal: Patient/Family Education  Outcome: Progressing Towards Goal     Problem: Patient Education: Go to Patient Education Activity  Goal: Patient/Family Education  Outcome: Progressing Towards Goal     Problem: Ischemic Stroke: Discharge Outcomes  Goal: *Verbalizes anxiety and depression are reduced or absent  Outcome: Progressing Towards Goal  Goal: *Verbalize understanding of risk factor modification(Stroke Metric)  Outcome: Progressing Towards Goal  Goal: *Hemodynamically stable  Outcome: Progressing Towards Goal  Goal: *Absence of aspiration pneumonia  Outcome: Progressing Towards Goal  Goal: *Aware of needed dietary changes  Outcome: Progressing Towards Goal  Goal: *Verbalize understanding of prescribed medications including anti-coagulants, anti-lipid, and/or anti-platelets(Stroke Metric)  Outcome: Progressing Towards Goal  Goal: *Tolerating diet  Outcome: Progressing Towards Goal  Goal: *Aware of follow-up diagnostics related to anticoagulants  Outcome: Progressing Towards Goal  Goal: *Ability to perform ADLs and demonstrates progressive mobility and function  Outcome: Progressing Towards Goal  Goal: *Absence of DVT(Stroke Metric)  Outcome: Progressing Towards Goal  Goal: *Absence of aspiration  Outcome: Progressing Towards Goal  Goal: *Optimal pain control at patient's stated goal  Outcome: Progressing Towards Goal  Goal: *Home safety concerns addressed  Outcome: Progressing Towards Goal  Goal: *Describes available resources and support systems  Outcome: Progressing Towards Goal  Goal: *Verbalizes understanding of activation of EMS(911) for stroke symptoms(Stroke Metric)  Outcome: Progressing Towards Goal  Goal: *Understands and describes signs and symptoms to report to providers(Stroke Metric)  Outcome: Progressing Towards Goal  Goal: *Neurolgocially stable (absence of additional neurological deficits)  Outcome: Progressing Towards Goal  Goal: *Verbalizes importance of follow-up with primary care physician(Stroke Metric)  Outcome: Progressing Towards Goal  Goal: *Smoking cessation discussed,if applicable(Stroke Metric)  Outcome: Progressing Towards Goal  Goal: *Depression screening completed(Stroke Metric)  Outcome: Progressing Towards Goal     Problem: Pain  Goal: *Control of Pain  Outcome: Progressing Towards Goal     Problem: Patient Education: Go to Patient Education Activity  Goal: Patient/Family Education  Outcome: Progressing Towards Goal     Problem: Patient Education: Go to Patient Education Activity  Goal: Patient/Family Education  Outcome: Progressing Towards Goal

## 2020-04-27 PROCEDURE — 65270000029 HC RM PRIVATE

## 2020-04-27 PROCEDURE — 97110 THERAPEUTIC EXERCISES: CPT

## 2020-04-27 PROCEDURE — 74011250637 HC RX REV CODE- 250/637: Performed by: INTERNAL MEDICINE

## 2020-04-27 PROCEDURE — 74011250636 HC RX REV CODE- 250/636: Performed by: INTERNAL MEDICINE

## 2020-04-27 PROCEDURE — 74011250637 HC RX REV CODE- 250/637: Performed by: NURSE PRACTITIONER

## 2020-04-27 PROCEDURE — 74011250637 HC RX REV CODE- 250/637: Performed by: HOSPITALIST

## 2020-04-27 RX ADMIN — METFORMIN HYDROCHLORIDE 500 MG: 500 TABLET ORAL at 17:16

## 2020-04-27 RX ADMIN — ACETAMINOPHEN 650 MG: 325 TABLET, FILM COATED ORAL at 14:26

## 2020-04-27 RX ADMIN — ATORVASTATIN CALCIUM 80 MG: 80 TABLET, FILM COATED ORAL at 23:01

## 2020-04-27 RX ADMIN — ACETAMINOPHEN 650 MG: 325 TABLET, FILM COATED ORAL at 23:01

## 2020-04-27 RX ADMIN — DIPHENHYDRAMINE HYDROCHLORIDE 25 MG: 25 CAPSULE ORAL at 17:16

## 2020-04-27 RX ADMIN — METOPROLOL SUCCINATE 25 MG: 25 TABLET, FILM COATED, EXTENDED RELEASE ORAL at 08:09

## 2020-04-27 RX ADMIN — ASPIRIN 81 MG 81 MG: 81 TABLET ORAL at 08:05

## 2020-04-27 RX ADMIN — Medication 400 MG: at 08:05

## 2020-04-27 RX ADMIN — LISINOPRIL 40 MG: 20 TABLET ORAL at 08:09

## 2020-04-27 RX ADMIN — METFORMIN HYDROCHLORIDE 500 MG: 500 TABLET ORAL at 08:05

## 2020-04-27 RX ADMIN — GUAIFENESIN 100 MG: 100 SOLUTION ORAL at 17:16

## 2020-04-27 RX ADMIN — ENOXAPARIN SODIUM 40 MG: 40 INJECTION SUBCUTANEOUS at 14:26

## 2020-04-27 NOTE — PROGRESS NOTES
Problem: Mobility Impaired (Adult and Pediatric)  Goal: *Acute Goals and Plan of Care  Description  GOALS UPDATED ON RE-ASSESSMENT 4/27/2020 (BOLD INDICATES UPDATED GOAL):  1. Patient will perform bed mobility with MODIFIED INDEPENDENCE within 7 treatment days. 2. Patient will perform transfer bed to chair with MODIFIED INDEPENDENCE within 7 treatment days. 3. Patient will demonstrate fair+ dynamic balance throughout stance phase on L LE within 7 treatment days. 4. Patient will require STAND BY ASSIST throughout swing phase on (to advance) L LE within 7 treatment days. 5. Patient demonstrate 3+/5 strength in L LE hip flexion, hip abduction, and hip adduction within 7 treatment days. 6. Patient will ambulate 200ft+ with STAND BY ASSIST and least retrictive assistive device within 7 treatment days. 7. Patient will perform wheelchair mobility 150 ft with modified independence within 7 treatment days. 8. Mr. Jackie Anderson will don/doff LUE sling for protection of L shoulder during transfers/gait with set up within 7 treatment days. 9. Pt's spouse / caregiver will demonstrate SBA guarding during transfers and household level (150ft) ambulation within 7 treatment days. Previously Met Goals:        PHYSICAL THERAPY: Daily Note, Re-evaluation and PM 4/27/2020  INPATIENT: PT Visit Days : 1  [id number] [address] / Plan: [address] [place] [address] Avenue / Product Type: Medicaid /       NAME/AGE/GENDER: Priti Kelly is a 53 y.o. male   PRIMARY DIAGNOSIS: Acute ischemic stroke ([address] [I63.9]  Cerebrovascular accident (CVA) due to embolism ([address] [Z38.0] Acute embolic stroke ([address] Acute embolic stroke ([address]       ICD-10: Treatment Diagnosis:   · Difficulty in walking, Not elsewhere classified (R26.2)  · Hemiplegia and hemiparesis following cerebral infarction affecting left non-dominant side (U57.307)   Precaution/Allergies:  Patient has no known allergies.       ASSESSMENT:     Mr. Henriquez is a 44 year old admitted R MCA CVA. Patient with prolonged hospital stay secondary to placement difficulty. During admission, Mr. Wendy Fischer has made steady progress toward acute PT goals. Patient is currently supervision for bed mobility, SBA for transfers, and SBA-CGA for community-level ambulation with use of luke-walker and supervision with verbal cues for WC mobility for household-level distances. Patient's biggest limitation continues to be proximal L UE strength as well as quad strength (3-/5) and dorsiflexion 2-/5. Goals updated and noted above to reflect patient progress. Set goals with patient to become more independent with transfer set up and ambulation. Will speak with MD about AFO appropriateness for L LE. Will follow with updated plan of care. Continue to recommend inpatient rehabilitation at discharge to optimize functional independence. Treatment initiated today to include transfer training, gait training, wheelchair mobility, and wheelchair management. Utilized ace wrap to L LE to assist with dorsiflexion. Patient required cueing for gait mechanics throughout for left foot clearance, limiting ER of L LE, and strong activation of L LE musculature to stabilize throughout stance phase. Good participation and progress today demonstrating improved gait mechanics since last re-evaluation. This section established at most recent assessment   PROBLEM LIST (Impairments causing functional limitations):  1. Decreased Strength  2. Decreased ADL/Functional Activities  3. Decreased Transfer Abilities  4. Decreased Ambulation Ability/Technique  5. Decreased Balance  6. Increased Pain  7. Decreased Activity Tolerance  8. Increased Fatigue  9. Decreased Flexibility/Joint Mobility   INTERVENTIONS PLANNED: (Benefits and precautions of physical therapy have been discussed with the patient.)  1. Balance Exercise  2. Bed Mobility  3. Family Education  4. Gait Training  5. Home Exercise Program (HEP)  6.  Manual Therapy  7. Neuromuscular Re-education/Strengthening  8. Range of Motion (ROM)  9. Therapeutic Activites  10. Therapeutic Exercise/Strengthening  11. Transfer Training     TREATMENT PLAN: Frequency/Duration: 5 times a week for duration of hospital stay  Rehabilitation Potential For Stated Goals: Good     REHAB RECOMMENDATIONS (at time of discharge pending progress):    Placement: It is my opinion, based on this patient's performance to date, that Mr. Aristeo Rascon may benefit from intensive therapy at an [address] [place] [address] after discharge due to a probable need for close medical supervision by a rehab physician, a probable need for multiple therapy disciplines and potential to make ongoing and sustainable functional improvement that is of practical value. .  Equipment:    TBD pending progress with therapy. HISTORY:   History of Present Injury/Illness (Reason for Referral):  Per H&P: \"Pt is a 54 y/o smoker with DM, HTN, who presented to ER with L leg and arm weakness, L facial droop, dysarthria. First noted L leg weakness late night 12/11 when he woke up to go to the bathroom. He was normal when he went to bed around 1030. Woke up this morning and had persistent weakness L leg and also now noted in L arm. EMS called. Noted to have slurred speech and L facial droop as well. Code S called in ER around 9am.  MRI with acute infarcts in L cerebellar hemisphere, deep frontoparietal white matter, and R paramedian yariel. Also noted old lacunar infarcts. No large vessel occlusion on CTA, but some stenosis noted. No hx afib, TIA, CVA. No CP, palpitations, SOB. \"  Past Medical History/Comorbidities:   Mr. Aristeo Rascon  has a past medical history of Acute ischemic stroke ([address] (12/12/2019), Acute pancreatitis (11/19/2014), Cerebrovascular accident (CVA) due to embolism ([address] (12/12/2019), Diabetes ([address] (2002), Diabetes ([address] Diabetes mellitus, and Hypertension.  He also has no past medical history of Arthritis, Asthma, Autoimmune disease ([facility] CAD (coronary artery disease), Cancer ([facility] Chronic kidney disease, COPD, Dementia, Dementia ([facility] Heart failure ([facility] Ill-defined condition, Infectious disease, Liver disease, Other ill-defined conditions(799.89), Psychiatric disorder, PUD (peptic ulcer disease), Seizures ([facility] or Sleep disorder. Mr. Krys Grimaldo  has a past surgical history that includes hx hernia repair and hx orthopaedic. Social History/Living Environment:   Home Environment: Apartment  # Steps to Enter: 12  Rails to Enter: Yes  [employer] : Bilateral  One/Two Story Residence: One story  Living Alone: No  Support Systems: Spouse/Significant Other/Partner  Patient Expects to be Discharged to[de-identified] Unknown  Current DME Used/Available at Home: None  Tub or Shower Type: Tub/Shower combination  Prior Level of Function/Work/Activity:  Independent, lives with wife in 2nd story 1 level apartment. No recent falls. Number of Personal Factors/Comorbidities that affect the Plan of Care: 1-2: MODERATE COMPLEXITY   EXAMINATION:   Most Recent Physical Functioning:   Gross Assessment: left hip grossly 2/5 to 3-/5  AROM: Generally decreased, functional  PROM: Within functional limits  Strength: Generally decreased, functional  Coordination: Generally decreased, functional  Tone: Abnormal(L UE; L LE)  Sensation: Intact                    Balance:  Sitting: Intact  Sitting - Static: Good (unsupported)  Sitting - Dynamic: Good (unsupported)  Standing: Impaired  Standing - Static: Good  Standing - Dynamic : Fair Bed Mobility:  Supine to Sit: Stand-by assistance  Sit to Supine: Stand-by assistance  Scooting: Stand-by assistance  Interventions: Verbal cues  Wheelchair Mobility: NT  Distance (ft): 100 feet  Transfers:  Sit to Stand: Stand-by assistance  Stand to Sit: Stand-by assistance  Bed to Chair: Stand-by assistance;Contact guard assistance  Interventions: Verbal cues  Gait:     Base of Support: Narrowed; Shift to right  Speed/Clotilde: Slow  Swing Pattern: Right asymmetrical  Gait Abnormalities: Hemiplegic  Distance (ft): 400 Feet (ft)  Assistive Device: Walker luke;Gait belt  Ambulation - Level of Assistance: Contact guard assistance;Stand-by assistance  Interventions: Safety awareness training; Tactile cues; Verbal cues      Body Structures Involved:  1. Nerves  2. Voice/Speech  3. Bones  4. Joints  5. Muscles Body Functions Affected:  1. Mental  2. Sensory/Pain  3. Neuromusculoskeletal  4. Movement Related Activities and Participation Affected:  1. General Tasks and Demands  2. Mobility  3. Self Care  4. Interpersonal Interactions and Relationships   Number of elements that affect the Plan of Care: 4+: HIGH COMPLEXITY   CLINICAL PRESENTATION:   Presentation: Evolving clinical presentation with changing clinical characteristics: MODERATE COMPLEXITY   CLINICAL DECISION MAKIN [facility] Inpatient Short Form  How much difficulty does the patient currently have. .. Unable A Lot A Little None   1. Turning over in bed (including adjusting bedclothes, sheets and blankets)? [] 1   [] 2   [] 3   [x] 4   2. Sitting down on and standing up from a chair with arms ( e.g., wheelchair, bedside commode, etc.)   [] 1   [] 2   [x] 3   [] 4   3. Moving from lying on back to sitting on the side of the bed? [] 1   [] 2   [] 3   [x] 4   How much help from another person does the patient currently need. .. Total A Lot A Little None   4. Moving to and from a bed to a chair (including a wheelchair)? [] 1   [] 2   [x] 3   [] 4   5. Need to walk in hospital room? [] 1   [] 2   [x] 3   [] 4   6. Climbing 3-5 steps with a railing? [] 1   [x] 2   [] 3   [] 4   © , Trustees of [facility], under license to [employer].  All rights reserved      Score:  Initial: 13 Most Recent: 19 (Date: 2020)    Interpretation of Tool:  Represents activities that are increasingly more difficult (i.e. Bed mobility, Transfers, Gait). Medical Necessity:     · Patient is expected to demonstrate progress in   · strength, range of motion, balance, coordination, and functional technique  ·  to   · increase independence with all mobility. · .  Reason for Services/Other Comments:  · Patient continues to require skilled intervention due to   · medical complications and mobility deficits which impact his level of function, safety, and independence as indicated above. · .   Use of outcome tool(s) and clinical judgement create a POC that gives a: Questionable prediction of patient's progress: MODERATE COMPLEXITY        TREATMENT:      Pre-treatment Symptoms/Complaints: see above  Pain: Initial: 0/10 Post Session:  0/10     Physical Therapy Re-evaluation (untimed charge)  Therapeutic Activity: (    30 Minutes) : Therapeutic activities including bed mobility training, transfer training, static/dynamic standing balance, ambulation on level ground with cues for gait mechanics, positioning of LLE during gait, wheel chair mobility/mechanics, and patient education  to improve mobility, strength and balance. Required minimal Safety awareness training; Tactile cues; Verbal cues to promote static and dynamic balance in standing and promote coordination of bilateral, lower extremity(s). Braces/Orthotics/Lines/Etc:   · Sling to LUE  Treatment/Session Assessment:    · Response to Treatment:  See above  · Interdisciplinary Collaboration:   o Physical Therapist  o Registered Nurse  o Certified Nursing Assistant/Patient Care Technician  · After treatment position/precautions:   o Supine in bed  o Bed/Chair-wheels locked  o Bed in low position  o Call light within reach  o RN notified  o Side rails x 3  o Patient needs met; NAD; positioned to comfort   · Compliance with Program/Exercises: Compliant all of the time  · Recommendations/Intent for next treatment session:   \"Next visit will focus on advancements to more challenging activities and reduction in assistance provided\".     Total Treatment Duration:  PT Patient Time In/Time Out  Time In: 1320  Time Out: CHRISTIANNE Dye

## 2020-04-27 NOTE — PROGRESS NOTES
Problem: Patient Education: Go to Patient Education Activity  Goal: Patient/Family Education  Outcome: Progressing Towards Goal     Problem: Pressure Injury - Risk of  Goal: *Prevention of pressure injury  Description: Document Dewey Scale and appropriate interventions in the flowsheet. Outcome: Progressing Towards Goal  Note: Pressure Injury Interventions:  Sensory Interventions: Assess changes in LOC    Moisture Interventions: Absorbent underpads, Apply protective barrier, creams and emollients, Check for incontinence Q2 hours and as needed    Activity Interventions: Pressure redistribution bed/mattress(bed type)    Mobility Interventions: HOB 30 degrees or less, Pressure redistribution bed/mattress (bed type)    Nutrition Interventions: Offer support with meals,snacks and hydration    Friction and Shear Interventions: HOB 30 degrees or less                Problem: Patient Education: Go to Patient Education Activity  Goal: Patient/Family Education  Outcome: Progressing Towards Goal     Problem: Falls - Risk of  Goal: *Absence of Falls  Description: Document Jeanne Fall Risk and appropriate interventions in the flowsheet.   Outcome: Progressing Towards Goal  Note: Fall Risk Interventions:  Mobility Interventions: Bed/chair exit alarm, Communicate number of staff needed for ambulation/transfer, Patient to call before getting OOB, Utilize walker, cane, or other assistive device    Mentation Interventions: Bed/chair exit alarm    Medication Interventions: Bed/chair exit alarm    Elimination Interventions: Bed/chair exit alarm, Call light in reach, Patient to call for help with toileting needs    History of Falls Interventions: Bed/chair exit alarm, Door open when patient unattended         Problem: Patient Education: Go to Patient Education Activity  Goal: Patient/Family Education  Outcome: Progressing Towards Goal     Problem: Diabetes Self-Management  Goal: *Disease process and treatment process  Description: Define diabetes and identify own type of diabetes; list 3 options for treating diabetes. Outcome: Progressing Towards Goal  Goal: *Incorporating nutritional management into lifestyle  Description: Describe effect of type, amount and timing of food on blood glucose; list 3 methods for planning meals. Outcome: Progressing Towards Goal  Goal: *Incorporating physical activity into lifestyle  Description: State effect of exercise on blood glucose levels. Outcome: Progressing Towards Goal  Goal: *Developing strategies to promote health/change behavior  Description: Define the ABC's of diabetes; identify appropriate screenings, schedule and personal plan for screenings. Outcome: Progressing Towards Goal  Goal: *Using medications safely  Description: State effect of diabetes medications on diabetes; name diabetes medication taking, action and side effects. Outcome: Progressing Towards Goal  Goal: *Monitoring blood glucose, interpreting and using results  Description: Identify recommended blood glucose targets  and personal targets. Outcome: Progressing Towards Goal  Goal: *Prevention, detection, treatment of acute complications  Description: List symptoms of hyper- and hypoglycemia; describe how to treat low blood sugar and actions for lowering  high blood glucose level. Outcome: Progressing Towards Goal  Goal: *Prevention, detection and treatment of chronic complications  Description: Define the natural course of diabetes and describe the relationship of blood glucose levels to long term complications of diabetes.   Outcome: Progressing Towards Goal  Goal: *Developing strategies to address psychosocial issues  Description: Describe feelings about living with diabetes; identify support needed and support network  Outcome: Progressing Towards Goal     Problem: Patient Education: Go to Patient Education Activity  Goal: Patient/Family Education  Outcome: Progressing Towards Goal     Problem: Patient Education: Go to Patient Education Activity  Goal: Patient/Family Education  Outcome: Progressing Towards Goal     Problem: Nutrition Deficit  Goal: *Optimize nutritional status  Outcome: Progressing Towards Goal     Problem: Patient Education: Go to Patient Education Activity  Goal: Patient/Family Education  Outcome: Progressing Towards Goal     Problem: General Medical Care Plan  Goal: *Vital signs within specified parameters  Outcome: Progressing Towards Goal  Goal: *Labs within defined limits  Outcome: Progressing Towards Goal  Goal: *Absence of infection signs and symptoms  Description: Wash hand more often   Outcome: Progressing Towards Goal  Goal: *Optimal pain control at patient's stated goal  Outcome: Progressing Towards Goal  Goal: *Skin integrity maintained  Outcome: Progressing Towards Goal  Goal: *Fluid volume balance  Outcome: Progressing Towards Goal  Goal: *Optimize nutritional status  Outcome: Progressing Towards Goal  Goal: *Anxiety reduced or absent  Outcome: Progressing Towards Goal  Goal: *Progressive mobility and function (eg: ADL's)  Outcome: Progressing Towards Goal     Problem: Patient Education: Go to Patient Education Activity  Goal: Patient/Family Education  Outcome: Progressing Towards Goal     Problem: Patient Education: Go to Patient Education Activity  Goal: Patient/Family Education  Outcome: Progressing Towards Goal     Problem: Patient Education: Go to Patient Education Activity  Goal: Patient/Family Education  Outcome: Progressing Towards Goal     Problem: Patient Education: Go to Patient Education Activity  Goal: Patient/Family Education  Outcome: Progressing Towards Goal     Problem: Ischemic Stroke: Discharge Outcomes  Goal: *Verbalizes anxiety and depression are reduced or absent  Outcome: Progressing Towards Goal  Goal: *Verbalize understanding of risk factor modification(Stroke Metric)  Outcome: Progressing Towards Goal  Goal: *Hemodynamically stable  Outcome: Progressing Towards Goal  Goal: *Absence of aspiration pneumonia  Outcome: Progressing Towards Goal  Goal: *Aware of needed dietary changes  Outcome: Progressing Towards Goal  Goal: *Verbalize understanding of prescribed medications including anti-coagulants, anti-lipid, and/or anti-platelets(Stroke Metric)  Outcome: Progressing Towards Goal  Goal: *Tolerating diet  Outcome: Progressing Towards Goal  Goal: *Aware of follow-up diagnostics related to anticoagulants  Outcome: Progressing Towards Goal  Goal: *Ability to perform ADLs and demonstrates progressive mobility and function  Outcome: Progressing Towards Goal  Goal: *Absence of DVT(Stroke Metric)  Outcome: Progressing Towards Goal  Goal: *Absence of aspiration  Outcome: Progressing Towards Goal  Goal: *Optimal pain control at patient's stated goal  Outcome: Progressing Towards Goal  Goal: *Home safety concerns addressed  Outcome: Progressing Towards Goal  Goal: *Describes available resources and support systems  Outcome: Progressing Towards Goal  Goal: *Verbalizes understanding of activation of EMS(911) for stroke symptoms(Stroke Metric)  Outcome: Progressing Towards Goal  Goal: *Understands and describes signs and symptoms to report to providers(Stroke Metric)  Outcome: Progressing Towards Goal  Goal: *Neurolgocially stable (absence of additional neurological deficits)  Outcome: Progressing Towards Goal  Goal: *Verbalizes importance of follow-up with primary care physician(Stroke Metric)  Outcome: Progressing Towards Goal  Goal: *Smoking cessation discussed,if applicable(Stroke Metric)  Outcome: Progressing Towards Goal  Goal: *Depression screening completed(Stroke Metric)  Outcome: Progressing Towards Goal     Problem: Pain  Goal: *Control of Pain  Outcome: Progressing Towards Goal     Problem: Patient Education: Go to Patient Education Activity  Goal: Patient/Family Education  Outcome: Progressing Towards Goal     Problem: Patient Education: Go to Patient Education Activity  Goal: Patient/Family Education  Outcome: Progressing Towards Goal

## 2020-04-27 NOTE — PROGRESS NOTES
Hospitalist Progress Note     Admit Date:  2019  9:07 AM   Name:  Jade Monge   Age:  53 y.o.  :  1975   MRN:  [id number]   PCP:  Yinka Valverde MD  Treatment Team: Attending Provider: Carissa Hannon MD; Care Manager: Carolin Daugherty [facility]; Utilization Review: Sayra Montlavo RN; Nurse Practitioner: Fer Joseph NP; Care Manager: Rosa Jones RN; Hospitalist: Shawnee White NP; Charge Nurse: Lavinia Mahan; Staff Nurse: Shashank Ramos; Physical Therapist: Omar Fox DPT; Occupational Therapist: Mary Fairchild    Subjective:   HPI and or CC:  52 yo AA male with PMH of DM, HTN admitted  for CVA affecting numerous areas of the brain. He was placed on ASA and statin. He has remained alert and oriented x3. Some movement to right side but unable to grasp with right hand. He is currently waiting on placement and this has remained difficult due to waiting on Medicaid. :  No change in patient. Alert and oriented x3. Voices no complaints today. Continues to wait for placement. Continues to wait on SSI and Medicaid approval.  He remains afebrile. Objective:     Patient Vitals for the past 24 hrs:   Temp Pulse Resp BP SpO2   20 0750 97.9 °F (36.6 °C) 71 18 134/82 97 %   20 0437 97.9 °F (36.6 °C) 75 18 131/85 97 %   20 0038 98 °F (36.7 °C) 72 18 126/88 97 %   20 1923 98 °F (36.7 °C) 72 18 127/86 99 %   20 1554 98.2 °F (36.8 °C) 73 18 123/78 94 %   20 1129 98.1 °F (36.7 °C) 71 18 129/80 93 %     Oxygen Therapy  O2 Sat (%): 97 % (20 0750)  Pulse via Oximetry: 75 beats per minute (20 0400)  O2 Device: Room air (20 0759)  No intake or output data in the 24 hours ending 20 0937      REVIEW OF SYSTEMS: Comprehensive ROS performed and negative except as stated in HPI. Physical Examination:  General:       No acute distress    Lungs:          CTA bilaterally.  Resp even and nonlabored  Heart:            S1S2 present without murmurs rubs gallops. RRR. No LE edema  Abdomen:    Soft, non tender, non distended. BS present  Extremities: No cyanosis. Left sided hemiparesis  Neurologic:  moves right hand and raises arm at elbow moderately, unable to squeeze my fingers left hand, mildly slurred speech.  PERRLA, strength 4/5 RUE/RLE. Data Review:  I have reviewed all labs, meds, telemetry events, and studies from the last 24 hours. No results found for this or any previous visit (from the past 24 hour(s)).      All Micro Results     None          Current Meds:  Current Facility-Administered Medications   Medication Dose Route Frequency    metFORMIN (GLUCOPHAGE) tablet 500 mg  500 mg Oral BID WITH MEALS    magnesium oxide (MAG-OX) tablet 400 mg  400 mg Oral DAILY    acetaminophen (TYLENOL) tablet 650 mg  650 mg Oral Q4H PRN    diphenhydrAMINE (BENADRYL) capsule 25 mg  25 mg Oral Q6H PRN    acetaminophen (TYLENOL) tablet 650 mg  650 mg Oral Q8H    lip protectant (BLISTEX) ointment 1 Each  1 Each Topical PRN    metoprolol succinate (TOPROL-XL) XL tablet 25 mg  25 mg Oral DAILY    polyethylene glycol (MIRALAX) packet 17 g  17 g Oral DAILY PRN    guaiFENesin (ROBITUSSIN) 100 mg/5 mL oral liquid 100 mg  100 mg Oral Q4H PRN    hydrALAZINE (APRESOLINE) 20 mg/mL injection 20 mg  20 mg IntraVENous Q4H PRN    atorvastatin (LIPITOR) tablet 80 mg  80 mg Oral QHS    lisinopril (PRINIVIL, ZESTRIL) tablet 40 mg  40 mg Oral DAILY    sodium chloride (NS) flush 5-40 mL  5-40 mL IntraVENous PRN    ondansetron (ZOFRAN) injection 4 mg  4 mg IntraVENous Q4H PRN    aspirin chewable tablet 81 mg  81 mg Oral DAILY    enoxaparin (LOVENOX) injection 40 mg  40 mg SubCUTAneous Q24H    dextrose 40% (GLUTOSE) oral gel 1 Tube  15 g Oral PRN    glucagon (GLUCAGEN) injection 1 mg  1 mg IntraMUSCular PRN    dextrose (D50W) injection syrg 12.5-25 g  25-50 mL IntraVENous PRN       Diet:  DIET NUTRITIONAL SUPPLEMENTS  DIET DIABETIC CONSISTENT CARB    Other Studies (last 24 hours):  No results found. Assessment and Plan:     Hospital Problems as of 4/27/2020 Date Reviewed: 3/3/2017          Codes Class Noted - Resolved POA    Hypomagnesemia ICD-10-CM: E83.42  ICD-9-CM: 275.2  3/7/2020 - Present Unknown        PFO (patent foramen ovale) ICD-10-CM: Q21.1  ICD-9-CM: 745.5  12/17/2019 - Present Yes        Arrhythmia ICD-10-CM: I49.9  ICD-9-CM: 427.9  12/15/2019 - Present Yes        Hyperlipemia ICD-10-CM: E78.5  ICD-9-CM: 272.4  12/15/2019 - Present Yes        * (Principal) Acute embolic stroke ([facility] [address] MLS-44-ZN: I63.9  ICD-9-CM: 434.11  12/12/2019 - Present Yes        Hypertension (Chronic) ICD-10-CM: I10  ICD-9-CM: 401.9  Unknown - Present Yes        Type 2 diabetes mellitus (HCC) (Chronic) ICD-10-CM: E11.9  ICD-9-CM: 250.00  Unknown - Present Yes              A/P:    Acute embolic stroke  MRI brain showed acute to early subacute infarcts in the left cerebellar hemisphere, in the periventricular deep left frontoparietal white matter and likely additional areas of restricted diffusion in the right paramedian yariel. +PFO on echo  On ASA, statin  Will need shunt closure once discharged.     Hypomagnesemia  Resolved       Hypertension  Continue metoprolol and ACE inhibitor  Goal BP <130/80     Type 2 diabetes mellitus:    Monitor, BGL controlled        Signed:  Tre Bennett NP

## 2020-04-27 NOTE — PROGRESS NOTES
Nutrition Assessment for:   Follow up    Assessment:   Pt is 54 yo male who presented with weakness, facial drooping and slurred speech. He has history of CVA, DM type 2, HTN, pancreatitis, GERD and a smoker. He remains hospitalized awaiting medicaid eligibility.    Working with OT at my visit. He reports he is eating well and continues to drink the glucerna. Last recorded BM 4/26. DIET NUTRITIONAL SUPPLEMENTS All Meals; Glucerna Shake  DIET DIABETIC CONSISTENT CARB Mechanical Soft    No recorded data available. Anthropometrics:  Height: 5' 6\" (167.6 cm), Weight: 95.3 kg (210 lb),  , Body mass index is 33.89 kg/m². BMI class of obese. Weight is from 12/12/19. Last 3 Recorded Weights in this Encounter    12/12/19 0906 04/13/20 1340   Weight: 95.3 kg (210 lb) 79.8 kg (175 lb 14.4 oz)   4/13 recorded as standing scale weight. Weight loss of ~17% consistent with muscle atrophy associated with limited mobility. Current Body mass index is 28.39 kg/m². BMI class of overweight. Macronutrient needs: (using Standing scale 80 kg) reassessed 4/27 with new weight  EER: 0644-4488 kcal/day (25-30 kcal/kg)  EPR:  g/day (1.2-1.3 g/kg)    Nutrition Intervention:  Meals and snacks: Continue current diet. Medical food supplement therapy: Continue glucerna TID. Based on pt report of intake of foods and supplements and periodic meals checks his intake appears adequate to meet needs. Coordination of nutrition care by a Nutrition Professional: Order weekly weight as pt now ambulates with PT more often and order to record oral intake. Discharge Nutrition Plan: Continue Oral Nutrition Supplement (ONS) at discharge. Recommend Glucerna or a comparable/similar product Three times daily for 30 days unless otherwise directed by your Primary Care Physician.     Kerline Varela, [address], [address]

## 2020-04-27 NOTE — PROGRESS NOTES
Problem: Self Care Deficits Care Plan (Adult)  Goal: *Acute Goals and Plan of Care (Insert Text)  Description  Goals per Re-evaluation on 3/18/2020:   1. Patient will demonstrate appropriate safety awareness and protection of L UE during bed mobility and functional transfers with minimal cues. (Progressing, still needs cues, 4/14/20)  2. Patient will complete total body bathing and dressing with Min A and adaptive equipment as needed. (Progressing 4/14/20  3. Patient will complete weightbearing into the L UE with ADL tasks with minimal assistance to improve ability to use as a functional assist during ADL tasks. (Progressing 4/14/20)4. Patient will demonstrate L UE SROM HEP within 7 days. (Progressing, 3/18/2020)  5. Pt will complete bed mobility with Mod I and minimal verbal cueing (Progressing, Supervision, 4/14/20). 6. Pt will complete dynamic sitting balance for ADLs at mod I with good balance (Progressing, 4/14/20  7. Pt will complete functional transfers with Supervision with equipment as needed. (Progressing, SBA to CGA 4/14/20)  8. Pt will complete grooming tasks in standing at sink level x5 mins with good balance and equipment as needed MET  9. Pt will complete grooming standing at sink level with SBA and use of adaptive equipment as needed. MET  10. Pt will don/doff UE sling with SBA and use of minimal verbal and visual cueing for correct application (Progressing, Min A 4/14/20. Goals below remain appropriate as of 4/24/2020:  1. Patient will demonstrate appropriate safety awareness and protection of L UE during bed mobility and functional transfers with no verbal cues. CONTINUE   2. Patient will complete lower body bathing and dressing with Min A and adaptive equipment as needed. CONTINUE  3. Patient will complete upper body bathing and dressing with SBA and adaptive equipment as needed.  CONTINUE  4. Patient will complete weightbearing into the L UE with ADL tasks with minimal assistance to improve ability to use as a functional assist during ADL tasks. CONTINUE  5. Patient will demonstrate L UE SROM HEP within 7 days. CONTINUE  6. Pt will complete bed mobility with Mod I and no verbal cueing. CONTINUE  7. Pt will complete functional transfers with Supervision with equipment as needed. CONTINUE  8. Pt will don/doff UE sling with Mod I and no verbal cueing. CONTINUE  9. Pt will complete toileting with SBA for toilet transfer and gown management. CONTINUE  10. Patient will participate in using L UE as a functional assist for ADL for 15 minutes with minimal facilitation. CONTINUE  11. Patient will complete sit to stand at midline with minimal assistance and cueing. CONTINUE  12. Patient will complete therapeutic exercises with L UE with minimal tactile cues for facilitation of the LUE. CONTINUE    Outcome: Progressing Towards Goal     OCCUPATIONAL THERAPY: Daily Note and PM    4/27/2020  INPATIENT: OT Visit Days: 2  [id number] [address] / Plan: [address] [place] [address] Avenue / Product Type: Medicaid /      NAME/AGE/GENDER: Henry Smiley is a 53 y.o. male   PRIMARY DIAGNOSIS:  Acute ischemic stroke ([address] [I63.9]  Cerebrovascular accident (CVA) due to embolism ([address] [S54.7] Acute embolic stroke ([address] Acute embolic stroke ([address]       ICD-10: Treatment Diagnosis:    · Generalized Muscle Weakness (M62.81)  · Other lack of cordination (R27.8)  · Hemiplegia and hemiparesis following cerebral infarction affecting   · left non-dominant side (I69.354)  · Abnormal posture (R29.3)   Precautions/Allergies:    NO PULLING ON LUE   LUE in sling with shoulder joint approximated and supported, needs taping   Patient has no known allergies. ASSESSMENT:     Mr. Breanna Colbert presents to the hospital with L sided weakness and acute ischemic CVA. MRI revealed acute to subacute L cerebellar and R paramedian yariel infarcts.      4/27/2020: Pt supine in bed sleeping upon arrival.  Pt drowsy from just waking up but agreeable to OT session. Pt requires Min A for supine to sit. Seated edge of bed, pt with intact static and dynamic seated balance. Pt requires CGA/SBA with use of luke walker for sit to stand. Pt with good static and fair dynamic standing balance. Pt requires CGA with use of luke walker to walk from bed to recliner chair. Pt noted to have decreased dragging of L LE and seems to be clearing ground with foot more than observed in prior session. Pt requires CGA/SBA with use of luke walker for stand to sit in recliner chair. Pt completed PROM exercises in L UE with use of R UE to assist. Pt also performed fine motor exercises to increase ROM, strength, and coordination in digits of L hand. Pt used R UE for L wrist stabilization to grasp small objects with L hand. Pt participated in [facility] thumb flexion/extension exercises with use of yellow putty and required Total A from therapy for flexion, but independent for extension. Pt observed to have increased speed, fine motor coordination, and improved object release this session. Pt left seated upright in recliner chair with all needs met and within reach at this time. Ambulation Team in room ready to proceed with afternoon session. RN notified. Will continue POC. This section established at most recent assessment   PROBLEM LIST (Impairments causing functional limitations):  1. Decreased Strength  2. Decreased ADL/Functional Activities  3. Decreased Transfer Abilities  4. Decreased Ambulation Ability/Technique  5. Decreased Balance  6. Decreased Activity Tolerance  7. Increased Fatigue  8. Decreased Flexibility/Joint Mobility  9. Decreased Knowledge of Precautions  10. Decreased [place] with Home Exercise Program   INTERVENTIONS PLANNED: (Benefits and precautions of occupational therapy have been discussed with the patient.)  1. Activities of daily living training  2. Adaptive equipment training  3. Balance training  4. Clothing management  5.  Cognitive training  6. Donning&doffing training  7. Keanu tech training  8. Neuromuscular re-eduation  9. Therapeutic activity  10. Therapeutic exercise     TREATMENT PLAN: Frequency/Duration: Follow patient 3 times per week to address above goals. Rehabilitation Potential For Stated Goals: Excellent     REHAB RECOMMENDATIONS (at time of discharge pending progress):    Placement: It is my opinion, based on this patient's performance to date, that Mr. Shanell Cueva may benefit from intensive therapy at an [address] [place] [address] after discharge due to potential to make ongoing and sustainable functional improvement that is of practical value. Greta Carls Pt functioning far below independent baseline, demonstrating good improvement and participation. Pt would likely benefit greatly and increase independence from inpatient rehab stay. Equipment:    TBD               OCCUPATIONAL PROFILE AND HISTORY:   History of Present Injury/Illness (Reason for Referral):  See H&P  Past Medical History/Comorbidities:   Mr. Shanell Cueva  has a past medical history of Acute ischemic stroke ([address] (12/12/2019), Acute pancreatitis (11/19/2014), Cerebrovascular accident (CVA) due to embolism ([address] (12/12/2019), Diabetes ([address] (2002), Diabetes ([address] Diabetes mellitus, and Hypertension. He also has no past medical history of Arthritis, Asthma, Autoimmune disease ([address] CAD (coronary artery disease), Cancer ([address] Chronic kidney disease, COPD, Dementia, Dementia ([address] Heart failure ([address] Ill-defined condition, Infectious disease, Liver disease, Other ill-defined conditions(799.89), Psychiatric disorder, PUD (peptic ulcer disease), Seizures ([address] or Sleep disorder. Mr. Shanell Cueva  has a past surgical history that includes hx hernia repair and hx orthopaedic.   Social History/Living Environment:   Home Environment: Apartment  # Steps to Enter: 12  Rails to Enter: Yes  [employer] : Bilateral  One/Two Story Residence: One story  Living Alone: No  Support Systems: Spouse/Significant Other/Partner  Patient Expects to be Discharged to[de-identified] Unknown  Current DME Used/Available at Home: None  Tub or Shower Type: Tub/Shower combination  Prior Level of Function/Work/Activity:  Pt lives at home with his wife. Pt is typically independent with ADL/functional mobility. Pt does not drive. Pt was working part-time at [employer]. Personal Factors:          Age:  53 y.o. Past/Current Experience:  CVA with flaccid L side        Other factors that influence how disability is experienced by the patient:  multiple co-morbidities    Number of Personal Factors/Comorbidities that affect the Plan of Care: Extensive review of physical, cognitive, and psychosocial performance (3+):  HIGH COMPLEXITY   ASSESSMENT OF OCCUPATIONAL PERFORMANCE[de-identified]   Activities of Daily Living:   Basic ADLs (From Assessment) Complex ADLs (From Assessment)   Feeding: Setup  Oral Facial Hygiene/Grooming: Minimum assistance  Bathing: Minimum assistance, Moderate assistance  Upper Body Dressing: Minimum assistance  Lower Body Dressing: Moderate assistance  Toileting: Minimum assistance Instrumental ADL  Meal Preparation: Total assistance  Homemaking: Total assistance  Medication Management: Total assistance  Financial Management: Total assistance   Grooming/Bathing/Dressing Activities of Daily Living         Upper Body Bathing  Bathing Assistance: Min A   Position Performed: Seated in chair                  Lower Body Dressing Assistance  Socks:  Total assistance (dependent) Bed/Mat Mobility  Supine to Sit: Minimum assistance  Sit to Supine: Stand-by assistance  Sit to Stand: Contact guard assistance;Stand-by assistance  Stand to Sit: Stand-by assistance  Bed to Chair: Contact guard assistance  Scooting: Stand-by assistance     Most Recent Physical Functioning:   Gross Assessment:                  Posture:     Balance:  Sitting: Intact  Sitting - Static: Good (unsupported)  Sitting - Dynamic: Good (unsupported)  Standing: Impaired  Standing - Static: Good  Standing - Dynamic : Fair Bed Mobility:  Supine to Sit: Minimum assistance  Sit to Supine: Stand-by assistance  Scooting: Stand-by assistance  Interventions: Verbal cues  Wheelchair Mobility:  Distance (ft): 100 feet  Transfers:  Sit to Stand: Contact guard assistance;Stand-by assistance  Stand to Sit: Stand-by assistance  Bed to Chair: Contact guard assistance  Interventions: Verbal cues            Patient Vitals for the past 6 hrs:   BP BP Patient Position SpO2 Pulse   20 1135 125/61 At rest 97 % (!) 54       Mental Status  Neurologic State: Alert  Orientation Level: Oriented X4  Cognition: Follows commands  Perception: Cues to attend to left side of body, Cues to maintain midline in sitting, Cues to maintain midline in standing  Perseveration: No perseveration noted  Safety/Judgement: Fall prevention, Home safety                          Physical Skills Involved:  1. Range of Motion  2. Balance  3. Strength  4. Activity Tolerance  5. Fine Motor Control  6. Gross Motor Control Cognitive Skills Affected (resulting in the inability to perform in a timely and safe manner):  1. Perception  2. Expression Psychosocial Skills Affected:  1. Habits/Routines  2. Environmental Adaptation  3. Social Interaction  4. Emotional Regulation  5. Self-Awareness  6. Awareness of Others  7. Social Roles   Number of elements that affect the Plan of Care: 5+:  HIGH COMPLEXITY   CLINICAL DECISION MAKIN [facility] [address] [address] AM-PAC 6 Clicks   Daily Activity Inpatient Short Form  How much help from another person does the patient currently need. .. Total A Lot A Little None   1. Putting on and taking off regular lower body clothing? [] 1   [x] 2   [] 3   [] 4   2. Bathing (including washing, rinsing, drying)? [] 1   [] 2   [x] 3   [] 4   3. Toileting, which includes using toilet, bedpan or urinal?   [] 1   [] 2   [x] 3   [] 4   4. Putting on and taking off regular upper body clothing?    [] 1   [] 2   [x] 3   [] 4   5. Taking care of personal grooming such as brushing teeth? [] 1   [] 2   [x] 3   [] 4   6. Eating meals? [] 1   [] 2   [x] 3   [] 4   © 2007, Trustees of [address] [place] [address] [address] [address], under license to [employer]. All rights reserved      Score:  Initial: 11 Most Recent: 17 (4/12/20)    Interpretation of Tool:  Represents activities that are increasingly more difficult (i.e. Bed mobility, Transfers, Gait). Medical Necessity:     · Patient demonstrates   · good and excellent  ·  rehab potential due to higher previous functional level. Reason for Services/Other Comments:  · Patient continues to require skilled intervention due to   · Decreased independence with ADL/functional transfers that impacts overall quality of life. · .   Use of outcome tool(s) and clinical judgement create a POC that gives a: MODERATE COMPLEXITY         TREATMENT:   (In addition to Assessment/Re-Assessment sessions the following treatments were rendered)     Pre-treatment Symptoms/Complaints: \"I'm tired. Pt reply following performance of BUE exercises. Pain: Initial:   Pain Intensity 1: 7/10  Pain Location: Dorsal surface of hand Post Session: Unchanged     Therapeutic Exercise: (30 minutes):  Exercises per grid below to improve mobility, strength, balance and coordinationRequired moderate visual, verbal, manual and tactile cues to promote proper body alignment, promote proper body posture and promote proper body mechanics. Progressed repetitions and complexity of movement as indicated.      Date:  4/15/20 Date:  4/16/20 Date:  4/20/20 Date:   4/22/2020 Date:  4/24/2020 Date:  4/27/2020   Activity/Exercise Parameters Parameters Parameters Parameters Parameters Parameters   Shoulder flexion SROM/AAROM 15 reps with L UE supported at the elbow by R UE (not past 90 degrees) 15 reps with L UE supported at the elbow by R UE (not past 90 degrees) 20 reps with L UE supported at the elbow by R UE (not past 90 degrees) Elbow flexion/extension SROM/AAROM 15 reps  15 reps  20 reps      Forearm supination/pronation  12 reps additional time 15 reps       Wrist extension  To ~15-20 degrees for 2 sets x 10 reps  15 reps 20 reps      Finger flexion/extension  AAROM for extension with end range stretch x 10 reps  Educated on SROM for finger extension stretch/finger flexion stretch  10 reps       Washcloth slides   Forward reach x 15 reps with mod facilitation; shoulder horizontal abd/add x 15 reps  Forward reach x 15 reps with mod facilitation; shoulder horizontal abd/add x 15 reps with mod facilitation      Forward Reaching with soccer ball    20 reps (using two handed technique with R hand over left; therapist supporting L elbow) 20 reps (using two handed technique with R hand over left; therapist supporting L elbow) 25 reps (using two handed technique with R hand over left; therapist supporting L elbow)   Crossing Midline with soccer ball       20 reps each way (using two handed technique with R hand over left; therapist supporting L elbow)   Shoulder Horizontal Abduction & Adduction with soccer ball    20 reps each way (using two handed technique with R hand over left; therapist supporting L elbow) 20 reps each way (using two handed technique with R hand over left; therapist supporting L elbow)    Shoulder Flexion & Crossing Midline with cones    14 reps (pt stabilized L wrist; therapist supporting L elbow) 14 reps (pt stabilized L wrist; therapist supporting L elbow) 14 reps (pt stabilized L wrist; therapist supporting L elbow)   Digit Flexion & Extension with pegs    24 reps (using two handed technique with assist needed for object release) 24 reps (using two handed technique with decreased assist needed for object release) 24 reps (using two handed technique with decreased assist needed for object release)   1st CMC Flexion/Extension     10 reps (using yellow putty with Total A from therapist to complete) 15 reps (using yellow putty with Total A from therapist to complete)     Neuromuscular Re-education: (0 minutes):  Exercise/activities per grid below to improve balance, coordination and posture. Required moderate visual, verbal, manual and tactile cues to promote dynamic balance in standing and promote motor control of left, upper extremity(s).            Date:  4/7/20 Date:  4/14/20  Date:  4/15/20 Date:   4/16/20   Activity/Exercise Parameters Parameters Parameters    Midline orientation sit to stand  Moderate cues and facilitation to decrease rotation at trunk and for midline trunk position Moderate facilitation at the L LE and for decreased rotation at the trunk     Midline sit to squat   Min to mod A w/ hands in his lap      Weightbearing into L side standing at sink  Minimal facilitation at the L UE; ~10 reps       Forward reach with cane 10 reps with facilitation at the elbow/scapula  10 reps with moderate facilitation at the elbow/scapula 15 reps with moderate facilitation at the elbow/scapula into protraction  15 reps with moderate facilitation at the elbow/scapula into protraction   External rotation with cane 10 reps with minimal facilitation  10 reps with minimal facilitation  15 reps with SBA/CGA  15 reps with SBA/CGA   Posture  Upright posture with thoracic extension and B hands place in the lap  Upright sitting/standing with verbal/visual cueing  Pt encouraged for upright posture with moderate cues throughout session  Pt encouraged for upright posture with moderate cues throughout session    Weightbearing into elbow   10 reps with moderate assistance pushing up into midline  2 sets with prolonged weight bearing and reaching to the L of midline for 2 sets x 15 reps  Sitting at the table with pt encouraged for propped elbows and weightbearing through the L with moderate cues    Weightbearing into hand  Mod to maximal assistance with facilitation at the elbow; 2 sets x 10 reps      Elbow flexion  10 reps AAROM; 10 reps holding ball in B hands      Grasp release of washcloth   10 reps with moderate to maximal facilitation for reaching   4-5 reps with additional time    Trunk Rotation    15 reps with minima facilitation  15 reps with minimal facilitation and additional time           Side Scooting    Minimal facilitation and assistance for positioning and weightbearing of L Hand through his L knee and forward forward flexion at the trunk       Braces/Orthotics/Lines/Etc:   · O2 device: Room air  Treatment/Session Assessment:    Response to Treatment:  Pt noted to have increased speed and coordination when performing fine motor tasks. · Interdisciplinary Collaboration:   o Occupational Therapist  o Registered Nurse  · After treatment position/precautions:   o Up in chair  o Bed/Chair-wheels locked  o Bed in low position  o Call light within reach  o RN notified  o Ambulation Team present in room   · Compliance with Program/Exercises: Compliant all of the time, Will assess as treatment progresses. · Recommendations/Intent for next treatment session: \"Next visit will focus on advancements to more challenging activities and reduction in assistance provided\".   Total Treatment Duration:   OT Patient Time In/Time Out  Time In: 1500  Time Out: 1530     Mary [place]

## 2020-04-28 PROCEDURE — 74011250637 HC RX REV CODE- 250/637: Performed by: INTERNAL MEDICINE

## 2020-04-28 PROCEDURE — 65270000029 HC RM PRIVATE

## 2020-04-28 PROCEDURE — 74011250637 HC RX REV CODE- 250/637: Performed by: HOSPITALIST

## 2020-04-28 PROCEDURE — 74011250636 HC RX REV CODE- 250/636: Performed by: INTERNAL MEDICINE

## 2020-04-28 PROCEDURE — 74011250637 HC RX REV CODE- 250/637: Performed by: NURSE PRACTITIONER

## 2020-04-28 RX ADMIN — METOPROLOL SUCCINATE 25 MG: 25 TABLET, FILM COATED, EXTENDED RELEASE ORAL at 08:38

## 2020-04-28 RX ADMIN — GUAIFENESIN 100 MG: 100 SOLUTION ORAL at 17:03

## 2020-04-28 RX ADMIN — METFORMIN HYDROCHLORIDE 500 MG: 500 TABLET ORAL at 17:03

## 2020-04-28 RX ADMIN — ACETAMINOPHEN 650 MG: 325 TABLET, FILM COATED ORAL at 06:13

## 2020-04-28 RX ADMIN — DIPHENHYDRAMINE HYDROCHLORIDE 25 MG: 25 CAPSULE ORAL at 17:03

## 2020-04-28 RX ADMIN — ENOXAPARIN SODIUM 40 MG: 40 INJECTION SUBCUTANEOUS at 14:34

## 2020-04-28 RX ADMIN — LISINOPRIL 40 MG: 20 TABLET ORAL at 08:37

## 2020-04-28 RX ADMIN — ACETAMINOPHEN 650 MG: 325 TABLET, FILM COATED ORAL at 07:59

## 2020-04-28 RX ADMIN — Medication 400 MG: at 08:00

## 2020-04-28 RX ADMIN — ACETAMINOPHEN 650 MG: 325 TABLET, FILM COATED ORAL at 21:59

## 2020-04-28 RX ADMIN — ATORVASTATIN CALCIUM 80 MG: 80 TABLET, FILM COATED ORAL at 22:00

## 2020-04-28 RX ADMIN — Medication 10 ML: at 22:00

## 2020-04-28 RX ADMIN — METFORMIN HYDROCHLORIDE 500 MG: 500 TABLET ORAL at 07:59

## 2020-04-28 RX ADMIN — ASPIRIN 81 MG 81 MG: 81 TABLET ORAL at 08:00

## 2020-04-28 RX ADMIN — ACETAMINOPHEN 650 MG: 325 TABLET, FILM COATED ORAL at 14:34

## 2020-04-28 NOTE — DIABETES MGMT
Re-attempted to see patient to assess capabilities to perform glucometer use. Patient states \"I know how to use it\" and refused return demonstration at this time stating \"i'll think about it. \" Will continue to follow along loosely.

## 2020-04-28 NOTE — PROGRESS NOTES
Problem: Patient Education: Go to Patient Education Activity  Goal: Patient/Family Education  Outcome: Progressing Towards Goal     Problem: Pressure Injury - Risk of  Goal: *Prevention of pressure injury  Description: Document Dewey Scale and appropriate interventions in the flowsheet. Outcome: Progressing Towards Goal  Note: Pressure Injury Interventions:  Sensory Interventions: Assess changes in LOC    Moisture Interventions: Absorbent underpads, Apply protective barrier, creams and emollients, Check for incontinence Q2 hours and as needed    Activity Interventions: Increase time out of bed, Pressure redistribution bed/mattress(bed type)    Mobility Interventions: Pressure redistribution bed/mattress (bed type)    Nutrition Interventions: Document food/fluid/supplement intake    Friction and Shear Interventions: HOB 30 degrees or less                Problem: Patient Education: Go to Patient Education Activity  Goal: Patient/Family Education  Outcome: Progressing Towards Goal     Problem: Falls - Risk of  Goal: *Absence of Falls  Description: Document Jeanne Fall Risk and appropriate interventions in the flowsheet.   Outcome: Progressing Towards Goal  Note: Fall Risk Interventions:  Mobility Interventions: Bed/chair exit alarm, Communicate number of staff needed for ambulation/transfer, Patient to call before getting OOB, Utilize walker, cane, or other assistive device    Mentation Interventions: Bed/chair exit alarm    Medication Interventions: Bed/chair exit alarm, Patient to call before getting OOB    Elimination Interventions: Bed/chair exit alarm, Call light in reach, Patient to call for help with toileting needs    History of Falls Interventions: Bed/chair exit alarm, Door open when patient unattended         Problem: Patient Education: Go to Patient Education Activity  Goal: Patient/Family Education  Outcome: Progressing Towards Goal     Problem: Diabetes Self-Management  Goal: *Disease process and treatment process  Description: Define diabetes and identify own type of diabetes; list 3 options for treating diabetes. Outcome: Progressing Towards Goal  Goal: *Incorporating nutritional management into lifestyle  Description: Describe effect of type, amount and timing of food on blood glucose; list 3 methods for planning meals. Outcome: Progressing Towards Goal  Goal: *Incorporating physical activity into lifestyle  Description: State effect of exercise on blood glucose levels. Outcome: Progressing Towards Goal  Goal: *Developing strategies to promote health/change behavior  Description: Define the ABC's of diabetes; identify appropriate screenings, schedule and personal plan for screenings. Outcome: Progressing Towards Goal  Goal: *Using medications safely  Description: State effect of diabetes medications on diabetes; name diabetes medication taking, action and side effects. Outcome: Progressing Towards Goal  Goal: *Monitoring blood glucose, interpreting and using results  Description: Identify recommended blood glucose targets  and personal targets. Outcome: Progressing Towards Goal  Goal: *Prevention, detection, treatment of acute complications  Description: List symptoms of hyper- and hypoglycemia; describe how to treat low blood sugar and actions for lowering  high blood glucose level. Outcome: Progressing Towards Goal  Goal: *Prevention, detection and treatment of chronic complications  Description: Define the natural course of diabetes and describe the relationship of blood glucose levels to long term complications of diabetes.   Outcome: Progressing Towards Goal  Goal: *Developing strategies to address psychosocial issues  Description: Describe feelings about living with diabetes; identify support needed and support network  Outcome: Progressing Towards Goal     Problem: Patient Education: Go to Patient Education Activity  Goal: Patient/Family Education  Outcome: Progressing Towards Goal Problem: Patient Education: Go to Patient Education Activity  Goal: Patient/Family Education  Outcome: Progressing Towards Goal     Problem: Nutrition Deficit  Goal: *Optimize nutritional status  Outcome: Progressing Towards Goal     Problem: Patient Education: Go to Patient Education Activity  Goal: Patient/Family Education  Outcome: Progressing Towards Goal     Problem: General Medical Care Plan  Goal: *Vital signs within specified parameters  Outcome: Progressing Towards Goal  Goal: *Labs within defined limits  Outcome: Progressing Towards Goal  Goal: *Absence of infection signs and symptoms  Description: Wash hand more often   Outcome: Progressing Towards Goal  Goal: *Optimal pain control at patient's stated goal  Outcome: Progressing Towards Goal  Goal: *Skin integrity maintained  Outcome: Progressing Towards Goal  Goal: *Fluid volume balance  Outcome: Progressing Towards Goal  Goal: *Optimize nutritional status  Outcome: Progressing Towards Goal  Goal: *Anxiety reduced or absent  Outcome: Progressing Towards Goal  Goal: *Progressive mobility and function (eg: ADL's)  Outcome: Progressing Towards Goal     Problem: Patient Education: Go to Patient Education Activity  Goal: Patient/Family Education  Outcome: Progressing Towards Goal     Problem: Patient Education: Go to Patient Education Activity  Goal: Patient/Family Education  Outcome: Progressing Towards Goal     Problem: Patient Education: Go to Patient Education Activity  Goal: Patient/Family Education  Outcome: Progressing Towards Goal     Problem: Patient Education: Go to Patient Education Activity  Goal: Patient/Family Education  Outcome: Progressing Towards Goal     Problem: Ischemic Stroke: Discharge Outcomes  Goal: *Verbalizes anxiety and depression are reduced or absent  Outcome: Progressing Towards Goal  Goal: *Verbalize understanding of risk factor modification(Stroke Metric)  Outcome: Progressing Towards Goal  Goal: *Hemodynamically stable  Outcome: Progressing Towards Goal  Goal: *Absence of aspiration pneumonia  Outcome: Progressing Towards Goal  Goal: *Aware of needed dietary changes  Outcome: Progressing Towards Goal  Goal: *Verbalize understanding of prescribed medications including anti-coagulants, anti-lipid, and/or anti-platelets(Stroke Metric)  Outcome: Progressing Towards Goal  Goal: *Tolerating diet  Outcome: Progressing Towards Goal  Goal: *Aware of follow-up diagnostics related to anticoagulants  Outcome: Progressing Towards Goal  Goal: *Ability to perform ADLs and demonstrates progressive mobility and function  Outcome: Progressing Towards Goal  Goal: *Absence of DVT(Stroke Metric)  Outcome: Progressing Towards Goal  Goal: *Absence of aspiration  Outcome: Progressing Towards Goal  Goal: *Optimal pain control at patient's stated goal  Outcome: Progressing Towards Goal  Goal: *Home safety concerns addressed  Outcome: Progressing Towards Goal  Goal: *Describes available resources and support systems  Outcome: Progressing Towards Goal  Goal: *Verbalizes understanding of activation of EMS(911) for stroke symptoms(Stroke Metric)  Outcome: Progressing Towards Goal  Goal: *Understands and describes signs and symptoms to report to providers(Stroke Metric)  Outcome: Progressing Towards Goal  Goal: *Neurolgocially stable (absence of additional neurological deficits)  Outcome: Progressing Towards Goal  Goal: *Verbalizes importance of follow-up with primary care physician(Stroke Metric)  Outcome: Progressing Towards Goal  Goal: *Smoking cessation discussed,if applicable(Stroke Metric)  Outcome: Progressing Towards Goal  Goal: *Depression screening completed(Stroke Metric)  Outcome: Progressing Towards Goal     Problem: Pain  Goal: *Control of Pain  Outcome: Progressing Towards Goal     Problem: Patient Education: Go to Patient Education Activity  Goal: Patient/Family Education  Outcome: Progressing Towards Goal     Problem: Patient Education: Go to Patient Education Activity  Goal: Patient/Family Education  Outcome: Progressing Towards Goal

## 2020-04-28 NOTE — PROGRESS NOTES
Hospitalist Progress Note     Admit Date:  2019  9:07 AM   Name:  Alanna Boss   Age:  53 y.o.  :  1975   MRN:  [id number]   PCP:  Ashley Sosa MD  Treatment Team: Attending Provider: Judi Rivers DO; Care Manager: Fabiola Garcia [facility]; Utilization Review: Rylan Asencio RN; Nurse Practitioner: Carlyle Tse NP; Care Manager: Audie Cheney RN; Hospitalist: Kobi Kelly NP; Charge Nurse: Dali Singh; Staff Nurse: Martin Baker; Physical Therapist: Aubrey Spaulding DPT    Subjective:   HPI and or CC:  52 yo AA male with PMH of DM, HTN admitted  for CVA affecting numerous areas of the brain. He was placed on ASA and statin. He has remained alert and oriented x3. Some movement to right side but unable to grasp with right hand. He is currently waiting on placement and this has remained difficult due to waiting on Medicaid. :  No change in patient. Alert and oriented x3. Voices no complaints today. Continues to wait for placement. Continues to wait on SSI and Medicaid approval.  He remains afebrile. Objective:     Patient Vitals for the past 24 hrs:   Temp Pulse Resp BP SpO2   20 0743 98 °F (36.7 °C) 69 18 136/89 96 %   20 0400 98.7 °F (37.1 °C) 78 18 127/87 97 %   20 2306 98 °F (36.7 °C) 73 18 118/78 96 %   20 1935 98.3 °F (36.8 °C) 72 18 114/75 97 %   20 1616 98.2 °F (36.8 °C) 67 18 103/71 98 %   20 1135 98.2 °F (36.8 °C) (!) 54 18 125/61 97 %     Oxygen Therapy  O2 Sat (%): 96 % (20 0743)  Pulse via Oximetry: 75 beats per minute (20 0400)  O2 Device: Room air (20 0759)  No intake or output data in the 24 hours ending 20 1016      REVIEW OF SYSTEMS: Comprehensive ROS performed and negative except as stated in HPI. Physical Examination:  General:       No acute distress    Lungs:          CTA bilaterally.  Resp even and nonlabored  Heart:            S1S2 present without murmurs rubs gallops. RRR. No LE edema  Abdomen:    Soft, non tender, non distended. BS present  Extremities: No cyanosis. Left sided hemiparesis  Neurologic:  moves right hand and raises arm at elbow moderately, unable to squeeze my fingers left hand, mildly slurred speech.  PERRLA, strength 4/5 RUE/RLE. Data Review:  I have reviewed all labs, meds, telemetry events, and studies from the last 24 hours. No results found for this or any previous visit (from the past 24 hour(s)).      All Micro Results     None          Current Meds:  Current Facility-Administered Medications   Medication Dose Route Frequency    metFORMIN (GLUCOPHAGE) tablet 500 mg  500 mg Oral BID WITH MEALS    magnesium oxide (MAG-OX) tablet 400 mg  400 mg Oral DAILY    acetaminophen (TYLENOL) tablet 650 mg  650 mg Oral Q4H PRN    diphenhydrAMINE (BENADRYL) capsule 25 mg  25 mg Oral Q6H PRN    acetaminophen (TYLENOL) tablet 650 mg  650 mg Oral Q8H    lip protectant (BLISTEX) ointment 1 Each  1 Each Topical PRN    metoprolol succinate (TOPROL-XL) XL tablet 25 mg  25 mg Oral DAILY    polyethylene glycol (MIRALAX) packet 17 g  17 g Oral DAILY PRN    guaiFENesin (ROBITUSSIN) 100 mg/5 mL oral liquid 100 mg  100 mg Oral Q4H PRN    hydrALAZINE (APRESOLINE) 20 mg/mL injection 20 mg  20 mg IntraVENous Q4H PRN    atorvastatin (LIPITOR) tablet 80 mg  80 mg Oral QHS    lisinopril (PRINIVIL, ZESTRIL) tablet 40 mg  40 mg Oral DAILY    sodium chloride (NS) flush 5-40 mL  5-40 mL IntraVENous PRN    ondansetron (ZOFRAN) injection 4 mg  4 mg IntraVENous Q4H PRN    aspirin chewable tablet 81 mg  81 mg Oral DAILY    enoxaparin (LOVENOX) injection 40 mg  40 mg SubCUTAneous Q24H    dextrose 40% (GLUTOSE) oral gel 1 Tube  15 g Oral PRN    glucagon (GLUCAGEN) injection 1 mg  1 mg IntraMUSCular PRN    dextrose (D50W) injection syrg 12.5-25 g  25-50 mL IntraVENous PRN       Diet:  DIET NUTRITIONAL SUPPLEMENTS  DIET DIABETIC CONSISTENT CARB    Other Studies (last 24 hours):  No results found. Assessment and Plan:     Hospital Problems as of 4/28/2020 Date Reviewed: 3/3/2017          Codes Class Noted - Resolved POA    Hypomagnesemia ICD-10-CM: E83.42  ICD-9-CM: 275.2  3/7/2020 - Present Unknown        PFO (patent foramen ovale) ICD-10-CM: Q21.1  ICD-9-CM: 745.5  12/17/2019 - Present Yes        Arrhythmia ICD-10-CM: I49.9  ICD-9-CM: 427.9  12/15/2019 - Present Yes        Hyperlipemia ICD-10-CM: E78.5  ICD-9-CM: 272.4  12/15/2019 - Present Yes        * (Principal) Acute embolic stroke ([facility] [address] [id number]: I63.9  ICD-9-CM: 434.11  12/12/2019 - Present Yes        Hypertension (Chronic) ICD-10-CM: I10  ICD-9-CM: 401.9  Unknown - Present Yes        Type 2 diabetes mellitus (HCC) (Chronic) ICD-10-CM: E11.9  ICD-9-CM: 250.00  Unknown - Present Yes              A/P:    Acute embolic stroke  MRI brain showed acute to early subacute infarcts in the left cerebellar hemisphere, in the periventricular deep left frontoparietal white matter and likely additional areas of restricted diffusion in the right paramedian yariel. +PFO on echo  On ASA, statin  Will need shunt closure once discharged.     Hypomagnesemia  Resolved       Hypertension  Continue metoprolol and ACE inhibitor  Goal BP <130/80     Type 2 diabetes mellitus:    Monitor, BGL controlled        Signed:  Chary Diaz NP

## 2020-04-28 NOTE — PROGRESS NOTES
TC to Disability Determination office. SW was informed that they have completed their part of the disability determination process and his case has now been sent back to the field office where he applied. TC to [facility]. Left VM message requesting a return call re: status of pt's disability claim. Pt has been approved for \"Parent/Caretaker Relative\" Medicaid but this does not cover SNF placement. Pt will have to be approved for disability before a SNF SABA application can be started. If pt does not meet level of care for SNF, he will need the disability income to pay for a place to live since the spouse does not want him to return home.

## 2020-04-28 NOTE — PROGRESS NOTES
Problem: Patient Education: Go to Patient Education Activity  Goal: Patient/Family Education  Outcome: Progressing Towards Goal     Problem: Patient Education: Go to Patient Education Activity  Goal: Patient/Family Education  Outcome: Progressing Towards Goal     Problem: Patient Education: Go to Patient Education Activity  Goal: Patient/Family Education  Outcome: Progressing Towards Goal     Problem: Diabetes Self-Management  Goal: *Disease process and treatment process  Description: Define diabetes and identify own type of diabetes; list 3 options for treating diabetes. Outcome: Progressing Towards Goal  Goal: *Incorporating nutritional management into lifestyle  Description: Describe effect of type, amount and timing of food on blood glucose; list 3 methods for planning meals. Outcome: Progressing Towards Goal  Goal: *Incorporating physical activity into lifestyle  Description: State effect of exercise on blood glucose levels. Outcome: Progressing Towards Goal  Goal: *Developing strategies to promote health/change behavior  Description: Define the ABC's of diabetes; identify appropriate screenings, schedule and personal plan for screenings. Outcome: Progressing Towards Goal  Goal: *Using medications safely  Description: State effect of diabetes medications on diabetes; name diabetes medication taking, action and side effects. Outcome: Progressing Towards Goal  Goal: *Monitoring blood glucose, interpreting and using results  Description: Identify recommended blood glucose targets  and personal targets. Outcome: Progressing Towards Goal  Goal: *Prevention, detection, treatment of acute complications  Description: List symptoms of hyper- and hypoglycemia; describe how to treat low blood sugar and actions for lowering  high blood glucose level.   Outcome: Progressing Towards Goal  Goal: *Prevention, detection and treatment of chronic complications  Description: Define the natural course of diabetes and describe the relationship of blood glucose levels to long term complications of diabetes.   Outcome: Progressing Towards Goal  Goal: *Developing strategies to address psychosocial issues  Description: Describe feelings about living with diabetes; identify support needed and support network  Outcome: Progressing Towards Goal     Problem: Patient Education: Go to Patient Education Activity  Goal: Patient/Family Education  Outcome: Progressing Towards Goal     Problem: Patient Education: Go to Patient Education Activity  Goal: Patient/Family Education  Outcome: Progressing Towards Goal     Problem: Nutrition Deficit  Goal: *Optimize nutritional status  Outcome: Progressing Towards Goal     Problem: Patient Education: Go to Patient Education Activity  Goal: Patient/Family Education  Outcome: Progressing Towards Goal     Problem: General Medical Care Plan  Goal: *Vital signs within specified parameters  Outcome: Progressing Towards Goal  Goal: *Labs within defined limits  Outcome: Progressing Towards Goal  Goal: *Absence of infection signs and symptoms  Description: Wash hand more often   Outcome: Progressing Towards Goal  Goal: *Optimal pain control at patient's stated goal  Outcome: Progressing Towards Goal  Goal: *Skin integrity maintained  Outcome: Progressing Towards Goal  Goal: *Fluid volume balance  Outcome: Progressing Towards Goal  Goal: *Optimize nutritional status  Outcome: Progressing Towards Goal  Goal: *Anxiety reduced or absent  Outcome: Progressing Towards Goal  Goal: *Progressive mobility and function (eg: ADL's)  Outcome: Progressing Towards Goal     Problem: Patient Education: Go to Patient Education Activity  Goal: Patient/Family Education  Outcome: Progressing Towards Goal     Problem: Patient Education: Go to Patient Education Activity  Goal: Patient/Family Education  Outcome: Progressing Towards Goal     Problem: Patient Education: Go to Patient Education Activity  Goal: Patient/Family Education  Outcome: Progressing Towards Goal     Problem: Patient Education: Go to Patient Education Activity  Goal: Patient/Family Education  Outcome: Progressing Towards Goal     Problem: Ischemic Stroke: Discharge Outcomes  Goal: *Verbalizes anxiety and depression are reduced or absent  Outcome: Progressing Towards Goal  Goal: *Verbalize understanding of risk factor modification(Stroke Metric)  Outcome: Progressing Towards Goal  Goal: *Hemodynamically stable  Outcome: Progressing Towards Goal  Goal: *Absence of aspiration pneumonia  Outcome: Progressing Towards Goal  Goal: *Aware of needed dietary changes  Outcome: Progressing Towards Goal  Goal: *Verbalize understanding of prescribed medications including anti-coagulants, anti-lipid, and/or anti-platelets(Stroke Metric)  Outcome: Progressing Towards Goal  Goal: *Tolerating diet  Outcome: Progressing Towards Goal  Goal: *Aware of follow-up diagnostics related to anticoagulants  Outcome: Progressing Towards Goal  Goal: *Ability to perform ADLs and demonstrates progressive mobility and function  Outcome: Progressing Towards Goal  Goal: *Absence of DVT(Stroke Metric)  Outcome: Progressing Towards Goal  Goal: *Absence of aspiration  Outcome: Progressing Towards Goal  Goal: *Optimal pain control at patient's stated goal  Outcome: Progressing Towards Goal  Goal: *Home safety concerns addressed  Outcome: Progressing Towards Goal  Goal: *Describes available resources and support systems  Outcome: Progressing Towards Goal  Goal: *Verbalizes understanding of activation of EMS(911) for stroke symptoms(Stroke Metric)  Outcome: Progressing Towards Goal  Goal: *Understands and describes signs and symptoms to report to providers(Stroke Metric)  Outcome: Progressing Towards Goal  Goal: *Neurolgocially stable (absence of additional neurological deficits)  Outcome: Progressing Towards Goal  Goal: *Verbalizes importance of follow-up with primary care physician(Stroke Metric)  Outcome: Progressing Towards Goal  Goal: *Smoking cessation discussed,if applicable(Stroke Metric)  Outcome: Progressing Towards Goal  Goal: *Depression screening completed(Stroke Metric)  Outcome: Progressing Towards Goal     Problem: Pain  Goal: *Control of Pain  Outcome: Progressing Towards Goal     Problem: Patient Education: Go to Patient Education Activity  Goal: Patient/Family Education  Outcome: Progressing Towards Goal     Problem: Patient Education: Go to Patient Education Activity  Goal: Patient/Family Education  Outcome: Progressing Towards Goal     Problem: Patient Education: Go to Patient Education Activity  Goal: Patient/Family Education  Outcome: Progressing Towards Goal     Problem: Patient Education: Go to Patient Education Activity  Goal: Patient/Family Education  Outcome: Progressing Towards Goal     Problem: Patient Education: Go to Patient Education Activity  Goal: Patient/Family Education  Outcome: Progressing Towards Goal     Problem: Diabetes Self-Management  Goal: *Disease process and treatment process  Description: Define diabetes and identify own type of diabetes; list 3 options for treating diabetes. Outcome: Progressing Towards Goal  Goal: *Incorporating nutritional management into lifestyle  Description: Describe effect of type, amount and timing of food on blood glucose; list 3 methods for planning meals. Outcome: Progressing Towards Goal  Goal: *Incorporating physical activity into lifestyle  Description: State effect of exercise on blood glucose levels. Outcome: Progressing Towards Goal  Goal: *Developing strategies to promote health/change behavior  Description: Define the ABC's of diabetes; identify appropriate screenings, schedule and personal plan for screenings. Outcome: Progressing Towards Goal  Goal: *Using medications safely  Description: State effect of diabetes medications on diabetes; name diabetes medication taking, action and side effects.   Outcome: Progressing Towards Goal  Goal: *Monitoring blood glucose, interpreting and using results  Description: Identify recommended blood glucose targets  and personal targets. Outcome: Progressing Towards Goal  Goal: *Prevention, detection, treatment of acute complications  Description: List symptoms of hyper- and hypoglycemia; describe how to treat low blood sugar and actions for lowering  high blood glucose level. Outcome: Progressing Towards Goal  Goal: *Prevention, detection and treatment of chronic complications  Description: Define the natural course of diabetes and describe the relationship of blood glucose levels to long term complications of diabetes.   Outcome: Progressing Towards Goal  Goal: *Developing strategies to address psychosocial issues  Description: Describe feelings about living with diabetes; identify support needed and support network  Outcome: Progressing Towards Goal     Problem: Patient Education: Go to Patient Education Activity  Goal: Patient/Family Education  Outcome: Progressing Towards Goal     Problem: Patient Education: Go to Patient Education Activity  Goal: Patient/Family Education  Outcome: Progressing Towards Goal     Problem: Nutrition Deficit  Goal: *Optimize nutritional status  Outcome: Progressing Towards Goal     Problem: Patient Education: Go to Patient Education Activity  Goal: Patient/Family Education  Outcome: Progressing Towards Goal     Problem: General Medical Care Plan  Goal: *Vital signs within specified parameters  Outcome: Progressing Towards Goal  Goal: *Labs within defined limits  Outcome: Progressing Towards Goal  Goal: *Absence of infection signs and symptoms  Description: Wash hand more often   Outcome: Progressing Towards Goal  Goal: *Optimal pain control at patient's stated goal  Outcome: Progressing Towards Goal  Goal: *Skin integrity maintained  Outcome: Progressing Towards Goal  Goal: *Fluid volume balance  Outcome: Progressing Towards Goal  Goal: *Optimize nutritional status  Outcome: Progressing Towards Goal  Goal: *Anxiety reduced or absent  Outcome: Progressing Towards Goal  Goal: *Progressive mobility and function (eg: ADL's)  Outcome: Progressing Towards Goal     Problem: Patient Education: Go to Patient Education Activity  Goal: Patient/Family Education  Outcome: Progressing Towards Goal     Problem: Patient Education: Go to Patient Education Activity  Goal: Patient/Family Education  Outcome: Progressing Towards Goal     Problem: Patient Education: Go to Patient Education Activity  Goal: Patient/Family Education  Outcome: Progressing Towards Goal     Problem: Patient Education: Go to Patient Education Activity  Goal: Patient/Family Education  Outcome: Progressing Towards Goal     Problem: Ischemic Stroke: Discharge Outcomes  Goal: *Verbalizes anxiety and depression are reduced or absent  Outcome: Progressing Towards Goal  Goal: *Verbalize understanding of risk factor modification(Stroke Metric)  Outcome: Progressing Towards Goal  Goal: *Hemodynamically stable  Outcome: Progressing Towards Goal  Goal: *Absence of aspiration pneumonia  Outcome: Progressing Towards Goal  Goal: *Aware of needed dietary changes  Outcome: Progressing Towards Goal  Goal: *Verbalize understanding of prescribed medications including anti-coagulants, anti-lipid, and/or anti-platelets(Stroke Metric)  Outcome: Progressing Towards Goal  Goal: *Tolerating diet  Outcome: Progressing Towards Goal  Goal: *Aware of follow-up diagnostics related to anticoagulants  Outcome: Progressing Towards Goal  Goal: *Ability to perform ADLs and demonstrates progressive mobility and function  Outcome: Progressing Towards Goal  Goal: *Absence of DVT(Stroke Metric)  Outcome: Progressing Towards Goal  Goal: *Absence of aspiration  Outcome: Progressing Towards Goal  Goal: *Optimal pain control at patient's stated goal  Outcome: Progressing Towards Goal  Goal: *Home safety concerns addressed  Outcome: Progressing Towards Goal  Goal: *Describes available resources and support systems  Outcome: Progressing Towards Goal  Goal: *Verbalizes understanding of activation of EMS(911) for stroke symptoms(Stroke Metric)  Outcome: Progressing Towards Goal  Goal: *Understands and describes signs and symptoms to report to providers(Stroke Metric)  Outcome: Progressing Towards Goal  Goal: *Neurolgocially stable (absence of additional neurological deficits)  Outcome: Progressing Towards Goal  Goal: *Verbalizes importance of follow-up with primary care physician(Stroke Metric)  Outcome: Progressing Towards Goal  Goal: *Smoking cessation discussed,if applicable(Stroke Metric)  Outcome: Progressing Towards Goal  Goal: *Depression screening completed(Stroke Metric)  Outcome: Progressing Towards Goal     Problem: Pain  Goal: *Control of Pain  Outcome: Progressing Towards Goal     Problem: Patient Education: Go to Patient Education Activity  Goal: Patient/Family Education  Outcome: Progressing Towards Goal     Problem: Patient Education: Go to Patient Education Activity  Goal: Patient/Family Education  Outcome: Progressing Towards Goal

## 2020-04-29 PROCEDURE — 74011250636 HC RX REV CODE- 250/636: Performed by: INTERNAL MEDICINE

## 2020-04-29 PROCEDURE — 74011250637 HC RX REV CODE- 250/637: Performed by: HOSPITALIST

## 2020-04-29 PROCEDURE — 74011250637 HC RX REV CODE- 250/637: Performed by: INTERNAL MEDICINE

## 2020-04-29 PROCEDURE — 65270000029 HC RM PRIVATE

## 2020-04-29 PROCEDURE — 97530 THERAPEUTIC ACTIVITIES: CPT

## 2020-04-29 PROCEDURE — 74011250637 HC RX REV CODE- 250/637: Performed by: NURSE PRACTITIONER

## 2020-04-29 RX ADMIN — ACETAMINOPHEN 650 MG: 325 TABLET, FILM COATED ORAL at 22:22

## 2020-04-29 RX ADMIN — ACETAMINOPHEN 650 MG: 325 TABLET, FILM COATED ORAL at 08:40

## 2020-04-29 RX ADMIN — ACETAMINOPHEN 650 MG: 325 TABLET, FILM COATED ORAL at 05:45

## 2020-04-29 RX ADMIN — DIPHENHYDRAMINE HYDROCHLORIDE 25 MG: 25 CAPSULE ORAL at 16:53

## 2020-04-29 RX ADMIN — METFORMIN HYDROCHLORIDE 500 MG: 500 TABLET ORAL at 08:40

## 2020-04-29 RX ADMIN — GUAIFENESIN 100 MG: 100 SOLUTION ORAL at 16:53

## 2020-04-29 RX ADMIN — Medication 400 MG: at 08:40

## 2020-04-29 RX ADMIN — LISINOPRIL 40 MG: 20 TABLET ORAL at 08:40

## 2020-04-29 RX ADMIN — METOPROLOL SUCCINATE 25 MG: 25 TABLET, FILM COATED, EXTENDED RELEASE ORAL at 08:40

## 2020-04-29 RX ADMIN — ENOXAPARIN SODIUM 40 MG: 40 INJECTION SUBCUTANEOUS at 14:48

## 2020-04-29 RX ADMIN — METFORMIN HYDROCHLORIDE 500 MG: 500 TABLET ORAL at 16:53

## 2020-04-29 RX ADMIN — ATORVASTATIN CALCIUM 80 MG: 80 TABLET, FILM COATED ORAL at 22:25

## 2020-04-29 RX ADMIN — ASPIRIN 81 MG 81 MG: 81 TABLET ORAL at 08:40

## 2020-04-29 RX ADMIN — Medication 10 ML: at 22:25

## 2020-04-29 NOTE — PROGRESS NOTES
Problem: Mobility Impaired (Adult and Pediatric)  Goal: *Acute Goals and Plan of Care  Description  GOALS UPDATED ON RE-ASSESSMENT 4/27/2020 (BOLD INDICATES UPDATED GOAL):  1. Patient will perform bed mobility with MODIFIED INDEPENDENCE within 7 treatment days. 2. Patient will perform transfer bed to chair with MODIFIED INDEPENDENCE within 7 treatment days. 3. Patient will demonstrate fair+ dynamic balance throughout stance phase on L LE within 7 treatment days. 4. Patient will require STAND BY ASSIST throughout swing phase on (to advance) L LE within 7 treatment days. 5. Patient demonstrate 3+/5 strength in L LE hip flexion, hip abduction, and hip adduction within 7 treatment days. 6. Patient will ambulate 200ft+ with STAND BY ASSIST and least retrictive assistive device within 7 treatment days. 7. Patient will perform wheelchair mobility 150 ft with modified independence within 7 treatment days. 8. Mr. Teresa Christine will don/doff LUE sling for protection of L shoulder during transfers/gait with set up within 7 treatment days. 9. Pt's spouse / caregiver will demonstrate SBA guarding during transfers and household level (150ft) ambulation within 7 treatment days. PHYSICAL THERAPY: Daily Note and PM 4/29/2020  INPATIENT: PT Visit Days : 2  [id number] [address] / Plan: [address] [place] [address] Avenue / Product Type: Medicaid /       NAME/AGE/GENDER: Daina Pinzon is a 53 y.o. male   PRIMARY DIAGNOSIS: Acute ischemic stroke ([facility] [address] [I63.9]  Cerebrovascular accident (CVA) due to embolism ([address] [B27.7] Acute embolic stroke ([address] Acute embolic stroke ([address]       ICD-10: Treatment Diagnosis:   · Difficulty in walking, Not elsewhere classified (R26.2)  · Hemiplegia and hemiparesis following cerebral infarction affecting left non-dominant side (K62.005)   Precaution/Allergies:  Patient has no known allergies. ASSESSMENT:     Mr. Henriquez is a 53year old admitted R MCA CVA.  Patient seen this PM for PT treatment session: presents up in wheelchair with ambulation team and motivated to participate. Donned L UE sling with minimal assistance, transfers with CGA-SBA, and ambulation with hemiwalker and CGA A. Patient required cueing for gait mechanics throughout for left foot clearance, limiting ER of L LE, and strong activation of L LE musculature to stabilize throughout stance phase. Mild increased trunk lean to right today with ambulation and patient was a little more fatigued then previous sessions. After rest break patient participated in wheelchair management and propelling with R UE and B LE x50ft with SBA. Continues to make steady progress toward goals. Goals remain appropriate and ongoing. Discussed with NP regarding AFO for L LE to improve gait mechanics. NP to place order to orthotist. Thank you. This section established at most recent assessment   PROBLEM LIST (Impairments causing functional limitations):  1. Decreased Strength  2. Decreased ADL/Functional Activities  3. Decreased Transfer Abilities  4. Decreased Ambulation Ability/Technique  5. Decreased Balance  6. Increased Pain  7. Decreased Activity Tolerance  8. Increased Fatigue  9. Decreased Flexibility/Joint Mobility   INTERVENTIONS PLANNED: (Benefits and precautions of physical therapy have been discussed with the patient.)  1. Balance Exercise  2. Bed Mobility  3. Family Education  4. Gait Training  5. Home Exercise Program (HEP)  6. Manual Therapy  7. Neuromuscular Re-education/Strengthening  8. Range of Motion (ROM)  9. Therapeutic Activites  10. Therapeutic Exercise/Strengthening  11. Transfer Training     TREATMENT PLAN: Frequency/Duration: 5 times a week for duration of hospital stay  Rehabilitation Potential For Stated Goals: Good     REHAB RECOMMENDATIONS (at time of discharge pending progress):    Placement:   It is my opinion, based on this patient's performance to date, that Mr. Karlos Alvarenga may benefit from intensive therapy at an [facility] after discharge due to a probable need for close medical supervision by a rehab physician, a probable need for multiple therapy disciplines and potential to make ongoing and sustainable functional improvement that is of practical value. .  Equipment:    TBD pending progress with therapy. HISTORY:   History of Present Injury/Illness (Reason for Referral):  Per H&P: \"Pt is a 52 y/o smoker with DM, HTN, who presented to ER with L leg and arm weakness, L facial droop, dysarthria. First noted L leg weakness late night 12/11 when he woke up to go to the bathroom. He was normal when he went to bed around 1030. Woke up this morning and had persistent weakness L leg and also now noted in L arm. EMS called. Noted to have slurred speech and L facial droop as well. Code S called in ER around 9am.  MRI with acute infarcts in L cerebellar hemisphere, deep frontoparietal white matter, and R paramedian yariel. Also noted old lacunar infarcts. No large vessel occlusion on CTA, but some stenosis noted. No hx afib, TIA, CVA. No CP, palpitations, SOB. \"  Past Medical History/Comorbidities:   Mr. Aristeo Rascon  has a past medical history of Acute ischemic stroke ([address] (12/12/2019), Acute pancreatitis (11/19/2014), Cerebrovascular accident (CVA) due to embolism ([address] (12/12/2019), Diabetes ([address] (2002), Diabetes ([address] Diabetes mellitus, and Hypertension. He also has no past medical history of Arthritis, Asthma, Autoimmune disease ([address] CAD (coronary artery disease), Cancer ([address] Chronic kidney disease, COPD, Dementia, Dementia ([address] Heart failure ([address] Ill-defined condition, Infectious disease, Liver disease, Other ill-defined conditions(799.89), Psychiatric disorder, PUD (peptic ulcer disease), Seizures ([address] or Sleep disorder. Mr. Aristeo Rascon  has a past surgical history that includes hx hernia repair and hx orthopaedic.   Social History/Living Environment:   Home Environment: Apartment  # Steps to Enter: 12  Rails to Enter: Yes  Hand Rails : Bilateral  One/Two Story Residence: One story  Living Alone: No  Support Systems: Spouse/Significant Other/Partner  Patient Expects to be Discharged to[de-identified] Unknown  Current DME Used/Available at Home: None  Tub or Shower Type: Tub/Shower combination  Prior Level of Function/Work/Activity:  Independent, lives with wife in 2nd story 1 level apartment. No recent falls. Number of Personal Factors/Comorbidities that affect the Plan of Care: 1-2: MODERATE COMPLEXITY   EXAMINATION:   Most Recent Physical Functioning:   Gross Assessment: left hip grossly 2/5 to 3-/5  AROM: Generally decreased, functional  PROM: Within functional limits  Strength: Generally decreased, functional  Coordination: Generally decreased, functional  Tone: Abnormal(L UE; L LE)  Sensation: Intact                    Balance:  Sitting: Intact  Sitting - Static: Good (unsupported)  Sitting - Dynamic: Good (unsupported)  Standing: Impaired  Standing - Static: Good  Standing - Dynamic : Fair Bed Mobility:  Sit to Supine: Stand-by assistance  Scooting: Stand-by assistance  Wheelchair Mobility: NT  Distance (ft): 50 feet  Transfers:  Sit to Stand: Stand-by assistance;Contact guard assistance  Stand to Sit: Contact guard assistance;Stand-by assistance  Bed to Chair: Stand-by assistance;Contact guard assistance  Interventions: Safety awareness training; Tactile cues; Verbal cues  Gait:     Base of Support: Narrowed; Shift to right  Swing Pattern: Right asymmetrical  Gait Abnormalities: Hemiplegic  Distance (ft): 400 Feet (ft)  Assistive Device: Walker luke;Gait belt(R UE sling)  Ambulation - Level of Assistance: Contact guard assistance  Interventions: Safety awareness training; Tactile cues; Verbal cues      Body Structures Involved:  1. Nerves  2. Voice/Speech  3. Bones  4. Joints  5. Muscles Body Functions Affected:  1. Mental  2. Sensory/Pain  3. Neuromusculoskeletal  4.  Movement Related Activities and Participation Affected:  1. General Tasks and Demands  2. Mobility  3. Self Care  4. Interpersonal Interactions and Relationships   Number of elements that affect the Plan of Care: 4+: HIGH COMPLEXITY   CLINICAL PRESENTATION:   Presentation: Evolving clinical presentation with changing clinical characteristics: MODERATE COMPLEXITY   CLINICAL DECISION MAKIN [facility] Mobility Inpatient Short Form  How much difficulty does the patient currently have. .. Unable A Lot A Little None   1. Turning over in bed (including adjusting bedclothes, sheets and blankets)? [] 1   [] 2   [] 3   [x] 4   2. Sitting down on and standing up from a chair with arms ( e.g., wheelchair, bedside commode, etc.)   [] 1   [] 2   [x] 3   [] 4   3. Moving from lying on back to sitting on the side of the bed? [] 1   [] 2   [] 3   [x] 4   How much help from another person does the patient currently need. .. Total A Lot A Little None   4. Moving to and from a bed to a chair (including a wheelchair)? [] 1   [] 2   [x] 3   [] 4   5. Need to walk in hospital room? [] 1   [] 2   [x] 3   [] 4   6. Climbing 3-5 steps with a railing? [] 1   [x] 2   [] 3   [] 4   © , Trustees of [address] [place] [address] [address] [address], under license to [employer]. All rights reserved      Score:  Initial: 13 Most Recent: 19 (Date: 2020)    Interpretation of Tool:  Represents activities that are increasingly more difficult (i.e. Bed mobility, Transfers, Gait). Medical Necessity:     · Patient is expected to demonstrate progress in   · strength, range of motion, balance, coordination, and functional technique  ·  to   · increase independence with all mobility. · .  Reason for Services/Other Comments:  · Patient continues to require skilled intervention due to   · medical complications and mobility deficits which impact his level of function, safety, and independence as indicated above.    · .   Use of outcome tool(s) and clinical judgement create a POC that gives a: Questionable prediction of patient's progress: MODERATE COMPLEXITY        TREATMENT:      Pre-treatment Symptoms/Complaints: see above  Pain: Initial: 0/10 Post Session:  0/10       Therapeutic Activity: (    24 Minutes) : Therapeutic activities including bed mobility training, transfer training, static/dynamic standing balance, ambulation on level ground with cues for gait mechanics, positioning of LLE during gait, wheel chair mobility/mechanics, and patient education  to improve mobility, strength and balance. Required minimal Safety awareness training; Tactile cues; Verbal cues to promote static and dynamic balance in standing and promote coordination of bilateral, lower extremity(s). Braces/Orthotics/Lines/Etc:   · Sling to LUE  Treatment/Session Assessment:    · Response to Treatment:  See above  · Interdisciplinary Collaboration:   o Physical Therapist  o Registered Nurse  o Certified Nursing Assistant/Patient Care Technician  · After treatment position/precautions:   o Supine in bed  o Bed alarm/tab alert on  o Bed/Chair-wheels locked  o Bed in low position  o Call light within reach  o RN notified  o Side rails x 3  o Patient needs met; NAD; positioned to comfort   · Compliance with Program/Exercises: Compliant all of the time  · Recommendations/Intent for next treatment session: \"Next visit will focus on advancements to more challenging activities and reduction in assistance provided\".     Total Treatment Duration:  PT Patient Time In/Time Out  Time In: 1426  Time Out: Ghazal Flores 93, DPT

## 2020-04-29 NOTE — PROGRESS NOTES
TC from SHERWIN Handley, local [facility] representative. She suggested that this writer contact Shiela Vences of the [facility] (# [phone]). They should be able to assist this writer to get an idea of where in the process the pt is since they are authorized representatives. TC to DIANNA Miguel. Left voice mail message requesting a return call. SW spoke with pt's spouse, via phone. She has not received any communication from [employer] or medicaid. She stated that the has moved to a first floor apartment but she still does not want the pt to return to her home at dc. She continues to maintain that she can not physically care for him and that mentally and emotionally she can not handle the stress. She stated they had been having marital problems even before his CVA and she was planning to leave him. She stated that she has talked to the pt about this and he is aware she does not want him to return home. SW explained that the pt may not be able to dc to a SNF depending on his functional ability at the time of dc. Pt may possibly qualify to go to some sort of communal living residence but he will not be able to do so until he is approved for disability and starts to receive an income. SW did explain to her that if these options do not work out and she still does not allow him to come home, he will have to go to a homeless shelter if he is physically able to do so. She verbalized understanding.

## 2020-04-29 NOTE — PROGRESS NOTES
Progress Note    2020  Admit Date: 2019  9:07 AM   NAME: Calin Yen   :  1975   MRN:  [id number]   Attending: Yuliya Lo DO  PCP:  Esthela Fenton MD  Treatment Team: Attending Provider: Yuliya Lo DO; Care Manager: Dk Simon LMSW; Utilization Review: Zahra Bush RN; Nurse Practitioner: Faith Aponte NP; Care Manager: Lukas Butts RN; Hospitalist: Dwain Alberto NP; Nurse Practitioner: Angelica Sunshine NP; Physical Therapist: Karson King DPT; Charge Nurse: Farideh Carrington    Full Code   SUBJECTIVE:   Mr. Sherine Joshi is a 54 yo male with PMH of DM, HTN who presented with c/o left sided weakness and left facial droop 19.   CT head showed age indeterminate infarctions within the left corona radiata/caudate.  CTA head/neck showed stenosis at the distal segment of the right vertebral artery and multifocal stenosis in the P2 segment of the left posterior cerebral artery. MRI brain showed acute to early subacute infarcts in the left cerebellar hemisphere, in the periventricular deep left frontoparietal white matter and likely additional areas of restricted diffusion in the right paramedian yariel.   Echo +PFO.  On statin, ASA.  Has been difficult to place in STR. Plans for 4 week event monitor on DC and if no arrhythmia PFO closure recommended. Pt remains stable today. Awaiting placement, medicaid pending. Denies complaints.     Past Medical History:   Diagnosis Date    Acute ischemic stroke ([facility] [address] 2019    Acute pancreatitis 2014    Cerebrovascular accident (CVA) due to embolism ([facility] [address] [facility] 2019    Diabetes ([facility] [address] 2002    Diabetes ([facility] [address] [facility]     Diabetes mellitus     Hypertension      No results found for this or any previous visit (from the past 25 hour(s)).   No Known Allergies  Current Facility-Administered Medications   Medication Dose Route Frequency Provider Last Rate Last Dose    metFORMIN (GLUCOPHAGE) tablet 500 mg  500 mg Oral BID WITH MEALS Saul Solitario NP   500 mg at 04/29/20 0840    magnesium oxide (MAG-OX) tablet 400 mg  400 mg Oral DAILY Sarah Hernandez MD   400 mg at 04/29/20 0840    acetaminophen (TYLENOL) tablet 650 mg  650 mg Oral Q4H PRN Nancy Mcmahan MD   650 mg at 04/29/20 0840    diphenhydrAMINE (BENADRYL) capsule 25 mg  25 mg Oral Q6H PRN Orlando Ramirez MD   25 mg at 04/28/20 1703    acetaminophen (TYLENOL) tablet 650 mg  650 mg Oral Shannon Robertson MD   650 mg at 04/29/20 0545    lip protectant (BLISTEX) ointment 1 Each  1 Each Topical PRN Phan Lim MD        metoprolol succinate (TOPROL-XL) XL tablet 25 mg  25 mg Oral DAILY Nancy Mcmahan MD   25 mg at 04/29/20 0840    polyethylene glycol (MIRALAX) packet 17 g  17 g Oral DAILY PRN Andre Murillo C, DO   17 g at 02/23/20 1708    guaiFENesin (ROBITUSSIN) 100 mg/5 mL oral liquid 100 mg  100 mg Oral Q4H PRN Nancy Mcmahan MD   100 mg at 04/28/20 1703    hydrALAZINE (APRESOLINE) 20 mg/mL injection 20 mg  20 mg IntraVENous Q4H PRN Dolores Up MD   20 mg at 04/17/20 0510    atorvastatin (LIPITOR) tablet 80 mg  80 mg Oral QHS Dolores Up MD   80 mg at 04/28/20 2200    lisinopril (PRINIVIL, ZESTRIL) tablet 40 mg  40 mg Oral DAILY Dolores Up MD   40 mg at 04/29/20 0840    sodium chloride (NS) flush 5-40 mL  5-40 mL IntraVENous PRN Dolores Up MD   10 mL at 04/28/20 2200    ondansetron (ZOFRAN) injection 4 mg  4 mg IntraVENous Q4H PRN Dolores Up MD        aspirin chewable tablet 81 mg  81 mg Oral DAILY Dolores Up MD   81 mg at 04/29/20 0840    enoxaparin (LOVENOX) injection 40 mg  40 mg SubCUTAneous Q24H Dolores Up MD   40 mg at 04/28/20 1434    dextrose 40% (GLUTOSE) oral gel 1 Tube  15 g Oral SHUN Up MD        glucagon [facility]) injection 1 mg  1 mg IntraMUSCular PRZEKE Up MD        dextrose (D50W) injection syrg 12.5-25 g 25-50 mL IntraVENous PRN Radha Singh MD           Review of Systems negative with exception of pertinent positives noted above  PHYSICAL EXAM     Visit Vitals  /77 (BP 1 Location: Right arm, BP Patient Position: At rest)   Pulse 67   Temp 98.2 °F (36.8 °C)   Resp 18   Ht 5' 6\" (1.676 m)   Wt 79.8 kg (175 lb 14.4 oz)   SpO2 95%   BMI 28.39 kg/m²      Temp (24hrs), Av °F (36.7 °C), Min:97.6 °F (36.4 °C), Max:98.2 °F (36.8 °C)    Oxygen Therapy  O2 Sat (%): 95 % (20 1134)  Pulse via Oximetry: 75 beats per minute (20 0400)  O2 Device: Room air (20 0832)  No intake or output data in the 24 hours ending 20 1255     General:       No acute distress    Lungs:          CTA bilaterally. Resp even and nonlabored  Heart:            S1S2 present without murmurs rubs gallops. RRR. No LE edema  Abdomen:    Soft, non tender, non distended. BS present  Extremities: No cyanosis. Left sided hemiparesis  Neurologic:  moves right hand and raises arm at elbow moderately, unable to squeeze my fingers left hand, mildly slurred speech.  PERRLA, strength 4/5 RUE/RLE. Results summary of Diagnostic Studies/Procedures copied from within [facility] EMR:      [address] Problems    Diagnosis Date Noted    Hypomagnesemia 2020    PFO (patent foramen ovale) 2019    Arrhythmia 12/15/2019    Hyperlipemia     Acute embolic stroke ([facility] [address]     Type 2 diabetes mellitus ([facility] [address]     Hypertension      Plan:  Acute embolic stroke  MRI brain showed acute to early subacute infarcts in the left cerebellar hemisphere, in the periventricular deep left frontoparietal white matter and likely additional areas of restricted diffusion in the right paramedian yariel. +PFO on echo  On ASA, statin  Will need 4 week event monitor on DC, if no arrhythmia then will need PFO closure      Hypomagnesemia  Replace IV today and on oral supplementation.    Hypertension  Continue metoprolol and ACE inhibitor  Goal BP <130/80     Type 2 diabetes mellitus:    Monitor, BGL controlled   A1C 6.9      Medically stable for DC.  Awaiting Medicaid approval.         Notes, labs, VS, diagnostic testing reviewed  Time spent with pt 20 min           DVT Prophylaxis: lovenox     Plan of Care Discussed with: Supervising MD Dr. Trever Arechiga, care team, pt      Ludmila Nguyen, NP     Pt updates wife daily. Does not need me to call per pt.

## 2020-04-30 PROCEDURE — 65270000029 HC RM PRIVATE

## 2020-04-30 PROCEDURE — 74011250637 HC RX REV CODE- 250/637: Performed by: INTERNAL MEDICINE

## 2020-04-30 PROCEDURE — 74011250637 HC RX REV CODE- 250/637: Performed by: HOSPITALIST

## 2020-04-30 PROCEDURE — 74011250636 HC RX REV CODE- 250/636: Performed by: INTERNAL MEDICINE

## 2020-04-30 PROCEDURE — 97535 SELF CARE MNGMENT TRAINING: CPT

## 2020-04-30 PROCEDURE — 74011250637 HC RX REV CODE- 250/637: Performed by: NURSE PRACTITIONER

## 2020-04-30 PROCEDURE — 97530 THERAPEUTIC ACTIVITIES: CPT

## 2020-04-30 RX ADMIN — ACETAMINOPHEN 650 MG: 325 TABLET, FILM COATED ORAL at 08:53

## 2020-04-30 RX ADMIN — DIPHENHYDRAMINE HYDROCHLORIDE 25 MG: 25 CAPSULE ORAL at 21:40

## 2020-04-30 RX ADMIN — GUAIFENESIN 100 MG: 100 SOLUTION ORAL at 08:51

## 2020-04-30 RX ADMIN — ACETAMINOPHEN 650 MG: 325 TABLET, FILM COATED ORAL at 01:00

## 2020-04-30 RX ADMIN — METOPROLOL SUCCINATE 25 MG: 25 TABLET, FILM COATED, EXTENDED RELEASE ORAL at 08:52

## 2020-04-30 RX ADMIN — ENOXAPARIN SODIUM 40 MG: 40 INJECTION SUBCUTANEOUS at 13:10

## 2020-04-30 RX ADMIN — ASPIRIN 81 MG 81 MG: 81 TABLET ORAL at 08:53

## 2020-04-30 RX ADMIN — ACETAMINOPHEN 650 MG: 325 TABLET, FILM COATED ORAL at 21:40

## 2020-04-30 RX ADMIN — ATORVASTATIN CALCIUM 80 MG: 80 TABLET, FILM COATED ORAL at 21:40

## 2020-04-30 RX ADMIN — ACETAMINOPHEN 650 MG: 325 TABLET, FILM COATED ORAL at 13:10

## 2020-04-30 RX ADMIN — METFORMIN HYDROCHLORIDE 500 MG: 500 TABLET ORAL at 08:54

## 2020-04-30 RX ADMIN — METFORMIN HYDROCHLORIDE 500 MG: 500 TABLET ORAL at 16:48

## 2020-04-30 RX ADMIN — LISINOPRIL 40 MG: 20 TABLET ORAL at 08:54

## 2020-04-30 RX ADMIN — DIPHENHYDRAMINE HYDROCHLORIDE 25 MG: 25 CAPSULE ORAL at 08:52

## 2020-04-30 RX ADMIN — Medication 400 MG: at 08:52

## 2020-04-30 RX ADMIN — GUAIFENESIN 100 MG: 100 SOLUTION ORAL at 21:41

## 2020-04-30 NOTE — PROGRESS NOTES
Problem: Mobility Impaired (Adult and Pediatric)  Goal: *Acute Goals and Plan of Care  Description  GOALS UPDATED ON RE-ASSESSMENT 4/27/2020 (BOLD INDICATES UPDATED GOAL):  1. Patient will perform bed mobility with MODIFIED INDEPENDENCE within 7 treatment days. 2. Patient will perform transfer bed to chair with MODIFIED INDEPENDENCE within 7 treatment days. 3. Patient will demonstrate fair+ dynamic balance throughout stance phase on L LE within 7 treatment days. 4. Patient will require STAND BY ASSIST throughout swing phase on (to advance) L LE within 7 treatment days. 5. Patient demonstrate 3+/5 strength in L LE hip flexion, hip abduction, and hip adduction within 7 treatment days. 6. Patient will ambulate 200ft+ with STAND BY ASSIST and least retrictive assistive device within 7 treatment days. 7. Patient will perform wheelchair mobility 150 ft with modified independence within 7 treatment days. 8. Mr. Annie Sykes will don/doff LUE sling for protection of L shoulder during transfers/gait with set up within 7 treatment days. 9. Pt's spouse / caregiver will demonstrate SBA guarding during transfers and household level (150ft) ambulation within 7 treatment days. PHYSICAL THERAPY: Daily Note and PM 4/30/2020  INPATIENT: PT Visit Days : 3  [id number] [address] / Plan: [address] [place] [address] Avenue / Product Type: Medicaid /       NAME/AGE/GENDER: Jade Monge is a 53 y.o. male   PRIMARY DIAGNOSIS: Acute ischemic stroke ([address] [I63.9]  Cerebrovascular accident (CVA) due to embolism ([address] [W18.5] Acute embolic stroke ([address] Acute embolic stroke ([address]       ICD-10: Treatment Diagnosis:   · Difficulty in walking, Not elsewhere classified (R26.2)  · Hemiplegia and hemiparesis following cerebral infarction affecting left non-dominant side (F53.154)   Precaution/Allergies:  Patient has no known allergies. ASSESSMENT:     Mr. Henriquez is a 53year old admitted R MCA CVA.  Patient seen this PM for PT treatment session: presents supine in bed s/p OT treatment session. Donned L UE sling with moderate assistance, transfers with CGA, and ambulation with hemiwalker and CGA A. Patient required cueing for gait mechanics throughout for left foot clearance, limiting ER of L LE, and strong activation of L LE musculature to stabilize throughout stance phase. Mild increased hyperextension in L LE; responded well to cueing. Addressed transfer to weak side with minimal assistance. Continues to make steady progress toward goals. Goals remain appropriate and ongoing. Discussed with NP regarding AFO for L LE to improve gait mechanics. NP to place order to orthotist. Consult called in per unit secretary. Will follow. Thank you. This section established at most recent assessment   PROBLEM LIST (Impairments causing functional limitations):  1. Decreased Strength  2. Decreased ADL/Functional Activities  3. Decreased Transfer Abilities  4. Decreased Ambulation Ability/Technique  5. Decreased Balance  6. Increased Pain  7. Decreased Activity Tolerance  8. Increased Fatigue  9. Decreased Flexibility/Joint Mobility   INTERVENTIONS PLANNED: (Benefits and precautions of physical therapy have been discussed with the patient.)  1. Balance Exercise  2. Bed Mobility  3. Family Education  4. Gait Training  5. Home Exercise Program (HEP)  6. Manual Therapy  7. Neuromuscular Re-education/Strengthening  8. Range of Motion (ROM)  9. Therapeutic Activites  10. Therapeutic Exercise/Strengthening  11. Transfer Training     TREATMENT PLAN: Frequency/Duration: 5 times a week for duration of hospital stay  Rehabilitation Potential For Stated Goals: Good     REHAB RECOMMENDATIONS (at time of discharge pending progress):    Placement:   It is my opinion, based on this patient's performance to date, that Mr. Naun Hampton may benefit from intensive therapy at an [address] [place] [address] after discharge due to a probable need for close medical supervision by a rehab physician, a probable need for multiple therapy disciplines and potential to make ongoing and sustainable functional improvement that is of practical value. .  Equipment:    TBD pending progress with therapy. HISTORY:   History of Present Injury/Illness (Reason for Referral):  Per H&P: \"Pt is a 54 y/o smoker with DM, HTN, who presented to ER with L leg and arm weakness, L facial droop, dysarthria. First noted L leg weakness late night 12/11 when he woke up to go to the bathroom. He was normal when he went to bed around 1030. Woke up this morning and had persistent weakness L leg and also now noted in L arm. EMS called. Noted to have slurred speech and L facial droop as well. Code S called in ER around 9am.  MRI with acute infarcts in L cerebellar hemisphere, deep frontoparietal white matter, and R paramedian yariel. Also noted old lacunar infarcts. No large vessel occlusion on CTA, but some stenosis noted. No hx afib, TIA, CVA. No CP, palpitations, SOB. \"  Past Medical History/Comorbidities:   Mr. Latrell Collins  has a past medical history of Acute ischemic stroke ([address] (12/12/2019), Acute pancreatitis (11/19/2014), Cerebrovascular accident (CVA) due to embolism ([address] (12/12/2019), Diabetes ([address] (2002), Diabetes ([address] Diabetes mellitus, and Hypertension. He also has no past medical history of Arthritis, Asthma, Autoimmune disease ([address] CAD (coronary artery disease), Cancer ([address] Chronic kidney disease, COPD, Dementia, Dementia ([address] Heart failure ([address] Ill-defined condition, Infectious disease, Liver disease, Other ill-defined conditions(799.89), Psychiatric disorder, PUD (peptic ulcer disease), Seizures ([address] or Sleep disorder. Mr. Latrell Collins  has a past surgical history that includes hx hernia repair and hx orthopaedic.   Social History/Living Environment:   Home Environment: Apartment  # Steps to Enter: 12  Rails to Enter: Yes  [employer] : Bilateral  One/Two Story Residence: One story  Living Alone: No  Support Systems: Spouse/Significant Other/Partner  Patient Expects to be Discharged to[de-identified] Unknown  Current DME Used/Available at Home: None  Tub or Shower Type: Tub/Shower combination  Prior Level of Function/Work/Activity:  Independent, lives with wife in 2nd story 1 level apartment. No recent falls. Number of Personal Factors/Comorbidities that affect the Plan of Care: 1-2: MODERATE COMPLEXITY   EXAMINATION:   Most Recent Physical Functioning:   Gross Assessment: left hip grossly 2/5 to 3-/5  AROM: Generally decreased, functional  PROM: Within functional limits  Strength: Generally decreased, functional  Coordination: Generally decreased, functional  Tone: Abnormal(L UE; L LE)  Sensation: Intact                    Balance:  Sitting: Intact  Sitting - Static: Good (unsupported)  Sitting - Dynamic: Good (unsupported)  Standing: Impaired  Standing - Static: Good  Standing - Dynamic : Fair Bed Mobility:  Supine to Sit: Additional time;Stand-by assistance  Sit to Supine: Stand-by assistance  Scooting: Supervision; Additional time  Wheelchair Mobility: NT     Transfers:  Sit to Stand: Contact guard assistance  Stand to Sit: Stand-by assistance  Interventions: Safety awareness training; Tactile cues; Verbal cues  Gait:     Base of Support: Narrowed; Shift to right  Swing Pattern: Right asymmetrical  Gait Abnormalities: Hemiplegic  Distance (ft): 400 Feet (ft)  Assistive Device: Walker luke;Gait belt  Ambulation - Level of Assistance: Contact guard assistance      Body Structures Involved:  1. Nerves  2. Voice/Speech  3. Bones  4. Joints  5. Muscles Body Functions Affected:  1. Mental  2. Sensory/Pain  3. Neuromusculoskeletal  4. Movement Related Activities and Participation Affected:  1. General Tasks and Demands  2. Mobility  3. Self Care  4.  Interpersonal Interactions and Relationships   Number of elements that affect the Plan of Care: 4+: HIGH COMPLEXITY   CLINICAL PRESENTATION: Presentation: Evolving clinical presentation with changing clinical characteristics: MODERATE COMPLEXITY   CLINICAL DECISION MAKIN [facility] Mobility Inpatient Short Form  How much difficulty does the patient currently have. .. Unable A Lot A Little None   1. Turning over in bed (including adjusting bedclothes, sheets and blankets)? [] 1   [] 2   [] 3   [x] 4   2. Sitting down on and standing up from a chair with arms ( e.g., wheelchair, bedside commode, etc.)   [] 1   [] 2   [x] 3   [] 4   3. Moving from lying on back to sitting on the side of the bed? [] 1   [] 2   [] 3   [x] 4   How much help from another person does the patient currently need. .. Total A Lot A Little None   4. Moving to and from a bed to a chair (including a wheelchair)? [] 1   [] 2   [x] 3   [] 4   5. Need to walk in hospital room? [] 1   [] 2   [x] 3   [] 4   6. Climbing 3-5 steps with a railing? [] 1   [x] 2   [] 3   [] 4   © 2007, Trustees of [address] [place] [address], under license to [employer]. All rights reserved      Score:  Initial: 13 Most Recent: 19 (Date: 2020)    Interpretation of Tool:  Represents activities that are increasingly more difficult (i.e. Bed mobility, Transfers, Gait). Medical Necessity:     · Patient is expected to demonstrate progress in   · strength, range of motion, balance, coordination, and functional technique  ·  to   · increase independence with all mobility. · .  Reason for Services/Other Comments:  · Patient continues to require skilled intervention due to   · medical complications and mobility deficits which impact his level of function, safety, and independence as indicated above.    · .   Use of outcome tool(s) and clinical judgement create a POC that gives a: Questionable prediction of patient's progress: MODERATE COMPLEXITY        TREATMENT:      Pre-treatment Symptoms/Complaints: see above  Pain: Initial: 0/10 Post Session:  0/10 Therapeutic Activity: (    33 Minutes) : Therapeutic activities including bed mobility training, transfer training, static/dynamic standing balance, ambulation on level ground with cues for gait mechanics, positioning of LLE during gait, wheel chair mobility/mechanics, and patient education  to improve mobility, strength and balance. Required minimal   to promote static and dynamic balance in standing and promote coordination of bilateral, lower extremity(s). Braces/Orthotics/Lines/Etc:   · Sling to LUE  Treatment/Session Assessment:    · Response to Treatment:  See above  · Interdisciplinary Collaboration:   o Physical Therapist  o Registered Nurse  o Rehabilitation Attendant  · After treatment position/precautions:   o Supine in bed  o Bed/Chair-wheels locked  o Bed in low position  o Call light within reach  o RN notified  o Side rails x 3  o Patient needs met; NAD; positioned to comfort   · Compliance with Program/Exercises: Compliant all of the time  · Recommendations/Intent for next treatment session: \"Next visit will focus on advancements to more challenging activities and reduction in assistance provided\".     Total Treatment Duration:  PT Patient Time In/Time Out  Time In: 5010  Time Out: 209 [facility]

## 2020-04-30 NOTE — PROGRESS NOTES
Problem: Self Care Deficits Care Plan (Adult)  Goal: *Acute Goals and Plan of Care (Insert Text)  Description  Goals per Re-evaluation on 3/18/2020:   1. Patient will demonstrate appropriate safety awareness and protection of L UE during bed mobility and functional transfers with minimal cues. (Progressing, still needs cues, 4/14/20)  2. Patient will complete total body bathing and dressing with Min A and adaptive equipment as needed. (Progressing 4/14/20  3. Patient will complete weightbearing into the L UE with ADL tasks with minimal assistance to improve ability to use as a functional assist during ADL tasks. (Progressing 4/14/20)4. Patient will demonstrate L UE SROM HEP within 7 days. (Progressing, 3/18/2020)  5. Pt will complete bed mobility with Mod I and minimal verbal cueing (Progressing, Supervision, 4/14/20). 6. Pt will complete dynamic sitting balance for ADLs at mod I with good balance (Progressing, 4/14/20  7. Pt will complete functional transfers with Supervision with equipment as needed. (Progressing, SBA to CGA 4/14/20)  8. Pt will complete grooming tasks in standing at sink level x5 mins with good balance and equipment as needed MET  9. Pt will complete grooming standing at sink level with SBA and use of adaptive equipment as needed. MET  10. Pt will don/doff UE sling with SBA and use of minimal verbal and visual cueing for correct application (Progressing, Min A 4/14/20. Goals below remain appropriate as of 4/24/2020:  1. Patient will demonstrate appropriate safety awareness and protection of L UE during bed mobility and functional transfers with no verbal cues. CONTINUE   2. Patient will complete lower body bathing and dressing with Min A and adaptive equipment as needed. CONTINUE  3. Patient will complete upper body bathing and dressing with SBA and adaptive equipment as needed.  CONTINUE  4. Patient will complete weightbearing into the L UE with ADL tasks with minimal assistance to improve ability to use as a functional assist during ADL tasks. CONTINUE  5. Patient will demonstrate L UE SROM HEP within 7 days. CONTINUE  6. Pt will complete bed mobility with Mod I and no verbal cueing. CONTINUE  7. Pt will complete functional transfers with Supervision with equipment as needed. CONTINUE  8. Pt will don/doff UE sling with Mod I and no verbal cueing. CONTINUE  9. Pt will complete toileting with SBA for toilet transfer and gown management. CONTINUE  10. Patient will participate in using L UE as a functional assist for ADL for 15 minutes with minimal facilitation. CONTINUE  11. Patient will complete sit to stand at midline with minimal assistance and cueing. CONTINUE  12. Patient will complete therapeutic exercises with L UE with minimal tactile cues for facilitation of the LUE. CONTINUE    Outcome: Progressing Towards Goal     OCCUPATIONAL THERAPY: Daily Note and PM    4/30/2020  INPATIENT: OT Visit Days: 3  Payor: [id number] / Plan: [place] / Product Type: Medicaid /      NAME/AGE/GENDER: Fernanda Vieyra is a 53 y.o. male   PRIMARY DIAGNOSIS:  Acute ischemic stroke ([address] [I63.9]  Cerebrovascular accident (CVA) due to embolism ([address] [N19.4] Acute embolic stroke ([address] Acute embolic stroke ([address]       ICD-10: Treatment Diagnosis:    · Generalized Muscle Weakness (M62.81)  · Other lack of cordination (R27.8)  · Hemiplegia and hemiparesis following cerebral infarction affecting   · left non-dominant side (I69.354)  · Abnormal posture (R29.3)   Precautions/Allergies:    NO PULLING ON LUE   LUE in sling with shoulder joint approximated and supported, needs taping   Patient has no known allergies. ASSESSMENT:     Mr. Sneha Munguia presents to the hospital with L sided weakness and acute ischemic CVA. MRI revealed acute to subacute L cerebellar and R paramedian yariel infarcts. 4/30/2020: Pt supine in bed upon arrival.  Pt alert and agreeable to be seen by OT.  Pt reports 8/10 pain on dorsal surface of L hand. Pt completes supine to sit with Min A from L side of bed. Once seated edge of bed, pt reports that he prefers to get out of bed on R side as it is easier. Will attempt bed mobility from R side of bed in future sessions. Seated edge of bed, pt with intact static and dynamic seated balance. Pt required Min A to doff soiled hospital gown. Pt requires Min A to complete upper body bathing with assist needed to clean R UE. Pt educated on one-handed dressing technique and requires Mod A to don pull over T-shirt. Pt performs lower body bathing with Set Up. Pt requires CGA/SBA with use of luke walker to complete sit to stand. In standing, pt requires Min A to doff soiled hospital brief with assist needed to doff from L LE. Pt completes perineal bathing in standing with Set Up and CGA/SBA. Pt requires CGA/SBA to return from stand to sit. In seated, pt requires Min A to don clean diaper brief and jogging shorts with assist needed for placement of L LE through leg hole. Pt requires CGA/SBA for sit to stand and requires Mod A to pull brief above knee and over buttocks with assist needed on L side while pt completes on R side. Pt requires CGA/SBA to return from stand to sit. In seated, pt requires Set Up to don shower cap and is independent to lather leave in shampoo into hair with use of R UE to complete. Pt provided with Set Up (towel) to dry hair with use of R UE. Pt requires Supervision to return from sit to supine. Pt left supine in bed with all needs met and within reach. RN notified. Will continue POC. This section established at most recent assessment   PROBLEM LIST (Impairments causing functional limitations):  1. Decreased Strength  2. Decreased ADL/Functional Activities  3. Decreased Transfer Abilities  4. Decreased Ambulation Ability/Technique  5. Decreased Balance  6. Decreased Activity Tolerance  7. Increased Fatigue  8. Decreased Flexibility/Joint Mobility  9.  Decreased Knowledge of Precautions  10. Decreased [place] with Home Exercise Program   INTERVENTIONS PLANNED: (Benefits and precautions of occupational therapy have been discussed with the patient.)  1. Activities of daily living training  2. Adaptive equipment training  3. Balance training  4. Clothing management  5. Cognitive training  6. Donning&doffing training  7. Keanu tech training  8. Neuromuscular re-eduation  9. Therapeutic activity  10. Therapeutic exercise     TREATMENT PLAN: Frequency/Duration: Follow patient 3 times per week to address above goals. Rehabilitation Potential For Stated Goals: Excellent     REHAB RECOMMENDATIONS (at time of discharge pending progress):    Placement: It is my opinion, based on this patient's performance to date, that Mr. Clovis Jordan may benefit from intensive therapy at an [address] [place] [address] after discharge due to potential to make ongoing and sustainable functional improvement that is of practical value. Mark Dang Pt functioning far below independent baseline, demonstrating good improvement and participation. Pt would likely benefit greatly and increase independence from inpatient rehab stay. Equipment:    TBD               OCCUPATIONAL PROFILE AND HISTORY:   History of Present Injury/Illness (Reason for Referral):  See H&P  Past Medical History/Comorbidities:   Mr. Clovis Jordan  has a past medical history of Acute ischemic stroke ([address] (12/12/2019), Acute pancreatitis (11/19/2014), Cerebrovascular accident (CVA) due to embolism ([address] (12/12/2019), Diabetes ([address] (2002), Diabetes ([address] Diabetes mellitus, and Hypertension.  He also has no past medical history of Arthritis, Asthma, Autoimmune disease ([address] CAD (coronary artery disease), Cancer ([address] Chronic kidney disease, COPD, Dementia, Dementia ([address] Heart failure ([address] Ill-defined condition, Infectious disease, Liver disease, Other ill-defined conditions(799.89), Psychiatric disorder, PUD (peptic ulcer disease), Seizures ([facility] or Sleep disorder. Mr. Latrell Collins  has a past surgical history that includes hx hernia repair and hx orthopaedic. Social History/Living Environment:   Home Environment: Apartment  # Steps to Enter: 12  Rails to Enter: Yes  [employer] : Bilateral  One/Two Story Residence: One story  Living Alone: No  Support Systems: Spouse/Significant Other/Partner  Patient Expects to be Discharged to[de-identified] Unknown  Current DME Used/Available at Home: None  Tub or Shower Type: Tub/Shower combination  Prior Level of Function/Work/Activity:  Pt lives at home with his wife. Pt is typically independent with ADL/functional mobility. Pt does not drive. Pt was working part-time at [employer]. Personal Factors:          Age:  53 y.o. Past/Current Experience:  CVA with flaccid L side        Other factors that influence how disability is experienced by the patient:  multiple co-morbidities    Number of Personal Factors/Comorbidities that affect the Plan of Care: Extensive review of physical, cognitive, and psychosocial performance (3+):  HIGH COMPLEXITY   ASSESSMENT OF OCCUPATIONAL PERFORMANCE[de-identified]   Activities of Daily Living:   Basic ADLs (From Assessment) Complex ADLs (From Assessment)   Feeding: Setup  Oral Facial Hygiene/Grooming: Minimum assistance  Bathing: Minimum assistance, Moderate assistance  Upper Body Dressing: Minimum assistance  Lower Body Dressing: Moderate assistance  Toileting: Minimum assistance Instrumental ADL  Meal Preparation: Total assistance  Homemaking: Total assistance  Medication Management: Total assistance  Financial Management: Total assistance   Grooming/Bathing/Dressing Activities of Daily Living   Grooming  Grooming Assistance: Set-up(To don shower cap)  Position Performed: Seated edge of bed     Upper Body Bathing  Bathing Assistance: Min A   Position Performed: Seated in chair      [address]: Set-up; Stand-by assistance;Contact guard assistance  Perineal  : Stand-by assistance;Contact guard assistance  Position Performed: (Seated edge of bed; standing)     Upper Body Dressing Assistance  Dressing Assistance: Moderate assistance  Pullover Shirt: Moderate assistance     Lower Body Dressing Assistance  Socks: Total assistance (dependent) Bed/Mat Mobility  Sit to Supine: Minimum assistance(From Left side of bed)  Sit to Stand: Stand-by assistance;Contact guard assistance  Stand to Sit: Stand-by assistance;Contact guard assistance  Scooting: Supervision     Most Recent Physical Functioning:   Gross Assessment:                  Posture:     Balance:  Sitting: Intact  Sitting - Static: Good (unsupported)  Sitting - Dynamic: Good (unsupported)  Standing: Impaired  Standing - Static: Good  Standing - Dynamic : Fair Bed Mobility:  Sit to Supine: Minimum assistance(From Left side of bed)  Scooting: Supervision  Wheelchair Mobility:     Transfers:  Sit to Stand: Stand-by assistance;Contact guard assistance  Stand to Sit: Stand-by assistance;Contact guard assistance            Patient Vitals for the past 6 hrs:   BP BP Patient Position SpO2 Pulse   04/30/20 1150 (!) 142/91 At rest 98 % 81       Mental Status  Neurologic State: Alert  Orientation Level: Oriented X4  Cognition: Follows commands  Perception: Cues to attend to left side of body, Cues to maintain midline in sitting, Cues to maintain midline in standing  Perseveration: No perseveration noted  Safety/Judgement: Fall prevention, Home safety                          Physical Skills Involved:  1. Range of Motion  2. Balance  3. Strength  4. Activity Tolerance  5. Fine Motor Control  6. Gross Motor Control Cognitive Skills Affected (resulting in the inability to perform in a timely and safe manner):  1. Perception  2. Expression Psychosocial Skills Affected:  1. Habits/Routines  2. Environmental Adaptation  3. Social Interaction  4. Emotional Regulation  5. Self-Awareness  6. Awareness of Others  7.  Social Roles   Number of elements that affect the Plan of Care: 5+:  HIGH COMPLEXITY   CLINICAL DECISION MAKIN[address] [place] [address] AM-PAC 6 Clicks   Daily Activity Inpatient Short Form  How much help from another person does the patient currently need. .. Total A Lot A Little None   1. Putting on and taking off regular lower body clothing? [] 1   [x] 2   [] 3   [] 4   2. Bathing (including washing, rinsing, drying)? [] 1   [] 2   [x] 3   [] 4   3. Toileting, which includes using toilet, bedpan or urinal?   [] 1   [] 2   [x] 3   [] 4   4. Putting on and taking off regular upper body clothing? [] 1   [] 2   [x] 3   [] 4   5. Taking care of personal grooming such as brushing teeth? [] 1   [] 2   [x] 3   [] 4   6. Eating meals? [] 1   [] 2   [x] 3   [] 4   © , Trustees of [address] [place] [address], under license to [employer]. All rights reserved      Score:  Initial: 11 Most Recent: 17 (20)    Interpretation of Tool:  Represents activities that are increasingly more difficult (i.e. Bed mobility, Transfers, Gait). Medical Necessity:     · Patient demonstrates   · good and excellent  ·  rehab potential due to higher previous functional level. Reason for Services/Other Comments:  · Patient continues to require skilled intervention due to   · Decreased independence with ADL/functional transfers that impacts overall quality of life. · .   Use of outcome tool(s) and clinical judgement create a POC that gives a: MODERATE COMPLEXITY         TREATMENT:   (In addition to Assessment/Re-Assessment sessions the following treatments were rendered)     Pre-treatment Symptoms/Complaints: \"I like that one. \" Pt reply when choosing which T-shirt to wear. Pain: Initial:   Pain Intensity 1: 8/10  Pain Location: Dorsal surface of hand Post Session: Unchanged     Self Care: (27 minutes): Procedure(s) below utilized to improve and/or restore self-care/home management as related to dressing, bathing and grooming.  Required minimal to moderate visual, verbal and manual cueing to facilitate activities of daily living skills. Pt required Min A to doff soiled hospital gown. Pt requires Min A to complete upper body bathing with assist needed to clean R UE. Pt educated on one-handed dressing technique and requires Mod A to don pull over T-shirt. Pt performs lower body bathing with Set Up. Pt requires CGA/SBA with use of luke walker to complete sit to stand. In standing, pt requires Min A to doff soiled hospital brief with assist needed to doff from L LE. Pt completes perineal bathing in standing with Set Up and CGA/SBA. Pt requires CGA/SBA to return from stand to sit. In seated, pt requires Min A to don clean diaper brief and jogging shorts with assist needed for placement of L LE through leg hole. Pt requires CGA/SBA for sit to stand and requires Mod A to pull brief above knee and over buttocks with assist needed on L side while pt completes on R side. Pt requires CGA/SBA to return from stand to sit. In seated, pt requires Set Up to don shower cap and is independent to lather leave in shampoo into hair with use of R UE to complete. Pt provided with Set Up (towel) to dry hair with use of R UE. Therapeutic Exercise: (0 minutes):  Exercises per grid below to improve mobility, strength, balance and coordinationRequired moderate visual, verbal, manual and tactile cues to promote proper body alignment, promote proper body posture and promote proper body mechanics. Progressed repetitions and complexity of movement as indicated.      Date:  4/15/20 Date:  4/16/20 Date:  4/20/20 Date:   4/22/2020 Date:  4/24/2020 Date:  4/27/2020   Activity/Exercise Parameters Parameters Parameters Parameters Parameters Parameters   Shoulder flexion SROM/AAROM 15 reps with L UE supported at the elbow by R UE (not past 90 degrees) 15 reps with L UE supported at the elbow by R UE (not past 90 degrees) 20 reps with L UE supported at the elbow by R UE (not past 90 degrees) Elbow flexion/extension SROM/AAROM 15 reps  15 reps  20 reps      Forearm supination/pronation  12 reps additional time 15 reps       Wrist extension  To ~15-20 degrees for 2 sets x 10 reps  15 reps 20 reps      Finger flexion/extension  AAROM for extension with end range stretch x 10 reps  Educated on SROM for finger extension stretch/finger flexion stretch  10 reps       Washcloth slides   Forward reach x 15 reps with mod facilitation; shoulder horizontal abd/add x 15 reps  Forward reach x 15 reps with mod facilitation; shoulder horizontal abd/add x 15 reps with mod facilitation      Forward Reaching with soccer ball    20 reps (using two handed technique with R hand over left; therapist supporting L elbow) 20 reps (using two handed technique with R hand over left; therapist supporting L elbow) 25 reps (using two handed technique with R hand over left; therapist supporting L elbow)   Crossing Midline with soccer ball       20 reps each way (using two handed technique with R hand over left; therapist supporting L elbow)   Shoulder Horizontal Abduction & Adduction with soccer ball    20 reps each way (using two handed technique with R hand over left; therapist supporting L elbow) 20 reps each way (using two handed technique with R hand over left; therapist supporting L elbow)    Shoulder Flexion & Crossing Midline with cones    14 reps (pt stabilized L wrist; therapist supporting L elbow) 14 reps (pt stabilized L wrist; therapist supporting L elbow) 14 reps (pt stabilized L wrist; therapist supporting L elbow)   Digit Flexion & Extension with pegs    24 reps (using two handed technique with assist needed for object release) 24 reps (using two handed technique with decreased assist needed for object release) 24 reps (using two handed technique with decreased assist needed for object release)   1st CMC Flexion/Extension     10 reps (using yellow putty with Total A from therapist to complete) 15 reps (using yellow putty with Total A from therapist to complete)     Neuromuscular Re-education: (0 minutes):  Exercise/activities per grid below to improve balance, coordination and posture. Required moderate visual, verbal, manual and tactile cues to promote dynamic balance in standing and promote motor control of left, upper extremity(s).            Date:  4/7/20 Date:  4/14/20  Date:  4/15/20 Date:   4/16/20   Activity/Exercise Parameters Parameters Parameters    Midline orientation sit to stand  Moderate cues and facilitation to decrease rotation at trunk and for midline trunk position Moderate facilitation at the L LE and for decreased rotation at the trunk     Midline sit to squat   Min to mod A w/ hands in his lap      Weightbearing into L side standing at sink  Minimal facilitation at the L UE; ~10 reps       Forward reach with cane 10 reps with facilitation at the elbow/scapula  10 reps with moderate facilitation at the elbow/scapula 15 reps with moderate facilitation at the elbow/scapula into protraction  15 reps with moderate facilitation at the elbow/scapula into protraction   External rotation with cane 10 reps with minimal facilitation  10 reps with minimal facilitation  15 reps with SBA/CGA  15 reps with SBA/CGA   Posture  Upright posture with thoracic extension and B hands place in the lap  Upright sitting/standing with verbal/visual cueing  Pt encouraged for upright posture with moderate cues throughout session  Pt encouraged for upright posture with moderate cues throughout session    Weightbearing into elbow   10 reps with moderate assistance pushing up into midline  2 sets with prolonged weight bearing and reaching to the L of midline for 2 sets x 15 reps  Sitting at the table with pt encouraged for propped elbows and weightbearing through the L with moderate cues    Weightbearing into hand  Mod to maximal assistance with facilitation at the elbow; 2 sets x 10 reps      Elbow flexion  10 reps AAROM; 10 reps holding ball in B hands      Grasp release of washcloth   10 reps with moderate to maximal facilitation for reaching   4-5 reps with additional time    Trunk Rotation    15 reps with minima facilitation  15 reps with minimal facilitation and additional time           Side Scooting    Minimal facilitation and assistance for positioning and weightbearing of L Hand through his L knee and forward forward flexion at the trunk       Braces/Orthotics/Lines/Etc:   · O2 device: Room air  Treatment/Session Assessment:    Response to Treatment:  Pt good participant in total body bathing and dressing session. · Interdisciplinary Collaboration:   o Physical Therapist  o Occupational Therapist  o Registered Nurse  · After treatment position/precautions:   o Supine in bed  o Bed alarm/tab alert on  o Bed/Chair-wheels locked  o Bed in low position  o Call light within reach  o RN notified   · Compliance with Program/Exercises: Compliant all of the time, Will assess as treatment progresses. · Recommendations/Intent for next treatment session: \"Next visit will focus on advancements to more challenging activities and reduction in assistance provided\".   Total Treatment Duration:   OT Patient Time In/Time Out  Time In: 1329  Time Out: 1356     Mary Fairchild

## 2020-04-30 NOTE — PROGRESS NOTES
Hospitalist Progress Note     Admit Date:  2019  9:07 AM   Name:  Britney Burgess   Age:  53 y.o.  :  1975   MRN:  [id number]   PCP:  Justice Dueñas MD  Treatment Team: Attending Provider: Pasha Navarrete DO; Care Manager: Brandon Nuñez LMSW; Utilization Review: Danilo Nath RN; Nurse Practitioner: Jimi Burroughs NP; Care Manager: Amy Amezcua RN; Hospitalist: Wing Eddie NP; Charge Nurse: Johnathon Salazar; Physical Therapist: Mra Ann DPT; Occupational Therapist: Mary Fairchild    Subjective:   HPI and or CC:  52 yo AA male with PMH of DM, HTN admitted  for CVA affecting numerous areas of the brain. He was placed on ASA and statin. He has remained alert and oriented x3. Some movement to right side but unable to grasp with right hand. He is currently waiting on placement and this has remained difficult due to waiting on Medicaid. :  No change in patient. Alert and oriented x3. Voices no complaints today. Continues to wait for placement. Continues to wait on SSI and Medicaid approval.  He remains afebrile. Objective:     Patient Vitals for the past 24 hrs:   Temp Pulse Resp BP SpO2   20 0742 97.3 °F (36.3 °C) 68 18 136/82 98 %   20 0443 98 °F (36.7 °C) 81 18 134/78 97 %   20 2333 98.2 °F (36.8 °C) 72 18 131/81 97 %   20 1942 97.9 °F (36.6 °C) 73 18 128/77 96 %   20 1622 98 °F (36.7 °C) 73 18 126/80 100 %   20 1134 98.2 °F (36.8 °C) 67 18 122/77 95 %     Oxygen Therapy  O2 Sat (%): 98 % (20 0742)  Pulse via Oximetry: 75 beats per minute (20 0400)  O2 Device: Room air (20 1648)  No intake or output data in the 24 hours ending 20 0946      REVIEW OF SYSTEMS: Comprehensive ROS performed and negative except as stated in HPI. Physical Examination:  General:       No acute distress    Lungs:          CTA bilaterally.  Resp even and nonlabored  Heart:            S1S2 present without murmurs rubs gallops. RRR. No LE edema  Abdomen:    Soft, non tender, non distended. BS present  Extremities: No cyanosis. Left sided hemiparesis  Neurologic:  moves right hand and raises arm at elbow moderately, unable to squeeze my fingers left hand, mildly slurred speech.  PERRLA, strength 4/5 RUE/RLE. Data Review:  I have reviewed all labs, meds, telemetry events, and studies from the last 24 hours. No results found for this or any previous visit (from the past 24 hour(s)).      All Micro Results     None          Current Meds:  Current Facility-Administered Medications   Medication Dose Route Frequency    metFORMIN (GLUCOPHAGE) tablet 500 mg  500 mg Oral BID WITH MEALS    magnesium oxide (MAG-OX) tablet 400 mg  400 mg Oral DAILY    acetaminophen (TYLENOL) tablet 650 mg  650 mg Oral Q4H PRN    diphenhydrAMINE (BENADRYL) capsule 25 mg  25 mg Oral Q6H PRN    acetaminophen (TYLENOL) tablet 650 mg  650 mg Oral Q8H    lip protectant (BLISTEX) ointment 1 Each  1 Each Topical PRN    metoprolol succinate (TOPROL-XL) XL tablet 25 mg  25 mg Oral DAILY    polyethylene glycol (MIRALAX) packet 17 g  17 g Oral DAILY PRN    guaiFENesin (ROBITUSSIN) 100 mg/5 mL oral liquid 100 mg  100 mg Oral Q4H PRN    hydrALAZINE (APRESOLINE) 20 mg/mL injection 20 mg  20 mg IntraVENous Q4H PRN    atorvastatin (LIPITOR) tablet 80 mg  80 mg Oral QHS    lisinopril (PRINIVIL, ZESTRIL) tablet 40 mg  40 mg Oral DAILY    sodium chloride (NS) flush 5-40 mL  5-40 mL IntraVENous PRN    ondansetron (ZOFRAN) injection 4 mg  4 mg IntraVENous Q4H PRN    aspirin chewable tablet 81 mg  81 mg Oral DAILY    enoxaparin (LOVENOX) injection 40 mg  40 mg SubCUTAneous Q24H    dextrose 40% (GLUTOSE) oral gel 1 Tube  15 g Oral PRN    glucagon (GLUCAGEN) injection 1 mg  1 mg IntraMUSCular PRN    dextrose (D50W) injection syrg 12.5-25 g  25-50 mL IntraVENous PRN       Diet:  DIET NUTRITIONAL SUPPLEMENTS  DIET DIABETIC CONSISTENT CARB    Other Studies (last 24 hours):  No results found. Assessment and Plan:     Hospital Problems as of 4/30/2020 Date Reviewed: 3/3/2017          Codes Class Noted - Resolved POA    Hypomagnesemia ICD-10-CM: E83.42  ICD-9-CM: 275.2  3/7/2020 - Present Unknown        PFO (patent foramen ovale) ICD-10-CM: Q21.1  ICD-9-CM: 745.5  12/17/2019 - Present Yes        Arrhythmia ICD-10-CM: I49.9  ICD-9-CM: 427.9  12/15/2019 - Present Yes        Hyperlipemia ICD-10-CM: E78.5  ICD-9-CM: 272.4  12/15/2019 - Present Yes        * (Principal) Acute embolic stroke ([facility] [address] [id number]: I63.9  ICD-9-CM: 434.11  12/12/2019 - Present Yes        Hypertension (Chronic) ICD-10-CM: I10  ICD-9-CM: 401.9  Unknown - Present Yes        Type 2 diabetes mellitus (HCC) (Chronic) ICD-10-CM: E11.9  ICD-9-CM: 250.00  Unknown - Present Yes              A/P:    Acute embolic stroke  MRI brain showed acute to early subacute infarcts in the left cerebellar hemisphere, in the periventricular deep left frontoparietal white matter and likely additional areas of restricted diffusion in the right paramedian yariel. +PFO on echo  On ASA, statin  Will need shunt closure once discharged.     Hypomagnesemia  Resolved       Hypertension  Continue metoprolol and ACE inhibitor  Goal BP <130/80     Type 2 diabetes mellitus:    Monitor, BGL controlled        Signed:  Heena Trejo NP

## 2020-04-30 NOTE — PROGRESS NOTES
Contacted by prosthetist regarding AFO order. Attempted to contact primary PT, but she had already left for the day. Perform chart review to update orthotist on pt's progress & gait deviations w/ PT. Orthotist will contact primary PT in AM to get more information &  clarify orthotic needs.

## 2020-04-30 NOTE — PROGRESS NOTES
04/30/20 1911   NIH Stroke Scale   Interval Other (comment)   LOC 0   LOC Questions 0   LOC Commands 0   Best Gaze 0   Visual 0   Facial Palsy 1   Motor Right Arm 0   Motor Left Arm 2   Motor Right Leg 0   Motor Left Leg 2   Limb Ataxia 0   Sensory 0   Best Language 0   Dysarthria 1   Extinction and Inattention 0   Total 6   ..

## 2020-05-01 PROCEDURE — 97116 GAIT TRAINING THERAPY: CPT

## 2020-05-01 PROCEDURE — 74011250637 HC RX REV CODE- 250/637: Performed by: INTERNAL MEDICINE

## 2020-05-01 PROCEDURE — 74011250636 HC RX REV CODE- 250/636: Performed by: INTERNAL MEDICINE

## 2020-05-01 PROCEDURE — 65270000029 HC RM PRIVATE

## 2020-05-01 PROCEDURE — 74011250637 HC RX REV CODE- 250/637: Performed by: NURSE PRACTITIONER

## 2020-05-01 PROCEDURE — 74011250637 HC RX REV CODE- 250/637: Performed by: HOSPITALIST

## 2020-05-01 RX ADMIN — ENOXAPARIN SODIUM 40 MG: 40 INJECTION SUBCUTANEOUS at 13:40

## 2020-05-01 RX ADMIN — ACETAMINOPHEN 650 MG: 325 TABLET, FILM COATED ORAL at 22:04

## 2020-05-01 RX ADMIN — GUAIFENESIN 100 MG: 100 SOLUTION ORAL at 22:04

## 2020-05-01 RX ADMIN — METFORMIN HYDROCHLORIDE 500 MG: 500 TABLET ORAL at 16:07

## 2020-05-01 RX ADMIN — Medication 400 MG: at 08:51

## 2020-05-01 RX ADMIN — Medication 10 ML: at 05:37

## 2020-05-01 RX ADMIN — GUAIFENESIN 100 MG: 100 SOLUTION ORAL at 08:50

## 2020-05-01 RX ADMIN — ACETAMINOPHEN 650 MG: 325 TABLET, FILM COATED ORAL at 08:50

## 2020-05-01 RX ADMIN — DIPHENHYDRAMINE HYDROCHLORIDE 25 MG: 25 CAPSULE ORAL at 22:04

## 2020-05-01 RX ADMIN — ASPIRIN 81 MG 81 MG: 81 TABLET ORAL at 08:51

## 2020-05-01 RX ADMIN — METFORMIN HYDROCHLORIDE 500 MG: 500 TABLET ORAL at 08:51

## 2020-05-01 RX ADMIN — ACETAMINOPHEN 650 MG: 325 TABLET, FILM COATED ORAL at 13:40

## 2020-05-01 RX ADMIN — ATORVASTATIN CALCIUM 80 MG: 80 TABLET, FILM COATED ORAL at 22:03

## 2020-05-01 RX ADMIN — METOPROLOL SUCCINATE 25 MG: 25 TABLET, FILM COATED, EXTENDED RELEASE ORAL at 08:51

## 2020-05-01 RX ADMIN — DIPHENHYDRAMINE HYDROCHLORIDE 25 MG: 25 CAPSULE ORAL at 08:51

## 2020-05-01 RX ADMIN — ACETAMINOPHEN 650 MG: 325 TABLET, FILM COATED ORAL at 05:37

## 2020-05-01 RX ADMIN — LISINOPRIL 40 MG: 20 TABLET ORAL at 08:51

## 2020-05-01 NOTE — PROGRESS NOTES
Physical Therapy Note:    Spoke with Whit at [facility] regarding AFO for patient's L LE. Discussed indications and type. Orthotist to measure today and make custom AFO and return to patient room in 1-2 days. Will follow.  Thank you,    Aquilino Herrera, PT, DPT  5/1/2020 8:15AM

## 2020-05-01 NOTE — PROGRESS NOTES
Problem: Patient Education: Go to Patient Education Activity  Goal: Patient/Family Education  Outcome: Progressing Towards Goal     Problem: Pressure Injury - Risk of  Goal: *Prevention of pressure injury  Description: Document Dewey Scale and appropriate interventions in the flowsheet. Outcome: Progressing Towards Goal  Note: Pressure Injury Interventions:  Sensory Interventions: Assess changes in LOC    Moisture Interventions: Absorbent underpads    Activity Interventions: Pressure redistribution bed/mattress(bed type)    Mobility Interventions: Pressure redistribution bed/mattress (bed type)    Nutrition Interventions: Offer support with meals,snacks and hydration    Friction and Shear Interventions: HOB 30 degrees or less                Problem: Patient Education: Go to Patient Education Activity  Goal: Patient/Family Education  Outcome: Progressing Towards Goal     Problem: Falls - Risk of  Goal: *Absence of Falls  Description: Document Jeanne Fall Risk and appropriate interventions in the flowsheet. Outcome: Progressing Towards Goal  Note: Fall Risk Interventions:  Mobility Interventions: Bed/chair exit alarm    Mentation Interventions: Bed/chair exit alarm, Door open when patient unattended    Medication Interventions: Bed/chair exit alarm    Elimination Interventions: Bed/chair exit alarm, Call light in reach    History of Falls Interventions: Bed/chair exit alarm         Problem: Patient Education: Go to Patient Education Activity  Goal: Patient/Family Education  Outcome: Progressing Towards Goal     Problem: Diabetes Self-Management  Goal: *Disease process and treatment process  Description: Define diabetes and identify own type of diabetes; list 3 options for treating diabetes. Outcome: Progressing Towards Goal  Goal: *Incorporating nutritional management into lifestyle  Description: Describe effect of type, amount and timing of food on blood glucose; list 3 methods for planning meals.   Outcome: Progressing Towards Goal  Goal: *Incorporating physical activity into lifestyle  Description: State effect of exercise on blood glucose levels. Outcome: Progressing Towards Goal  Goal: *Developing strategies to promote health/change behavior  Description: Define the ABC's of diabetes; identify appropriate screenings, schedule and personal plan for screenings. Outcome: Progressing Towards Goal  Goal: *Using medications safely  Description: State effect of diabetes medications on diabetes; name diabetes medication taking, action and side effects. Outcome: Progressing Towards Goal  Goal: *Monitoring blood glucose, interpreting and using results  Description: Identify recommended blood glucose targets  and personal targets. Outcome: Progressing Towards Goal  Goal: *Prevention, detection, treatment of acute complications  Description: List symptoms of hyper- and hypoglycemia; describe how to treat low blood sugar and actions for lowering  high blood glucose level. Outcome: Progressing Towards Goal  Goal: *Prevention, detection and treatment of chronic complications  Description: Define the natural course of diabetes and describe the relationship of blood glucose levels to long term complications of diabetes.   Outcome: Progressing Towards Goal  Goal: *Developing strategies to address psychosocial issues  Description: Describe feelings about living with diabetes; identify support needed and support network  Outcome: Progressing Towards Goal     Problem: Patient Education: Go to Patient Education Activity  Goal: Patient/Family Education  Outcome: Progressing Towards Goal     Problem: Patient Education: Go to Patient Education Activity  Goal: Patient/Family Education  Outcome: Progressing Towards Goal     Problem: Nutrition Deficit  Goal: *Optimize nutritional status  Outcome: Progressing Towards Goal     Problem: Patient Education: Go to Patient Education Activity  Goal: Patient/Family Education  Outcome: Progressing Towards Goal     Problem: General Medical Care Plan  Goal: *Vital signs within specified parameters  Outcome: Progressing Towards Goal  Goal: *Labs within defined limits  Outcome: Progressing Towards Goal  Goal: *Absence of infection signs and symptoms  Description: Wash hand more often   Outcome: Progressing Towards Goal  Goal: *Optimal pain control at patient's stated goal  Outcome: Progressing Towards Goal  Goal: *Skin integrity maintained  Outcome: Progressing Towards Goal  Goal: *Fluid volume balance  Outcome: Progressing Towards Goal  Goal: *Optimize nutritional status  Outcome: Progressing Towards Goal  Goal: *Anxiety reduced or absent  Outcome: Progressing Towards Goal  Goal: *Progressive mobility and function (eg: ADL's)  Outcome: Progressing Towards Goal     Problem: Patient Education: Go to Patient Education Activity  Goal: Patient/Family Education  Outcome: Progressing Towards Goal     Problem: Patient Education: Go to Patient Education Activity  Goal: Patient/Family Education  Outcome: Progressing Towards Goal     Problem: Patient Education: Go to Patient Education Activity  Goal: Patient/Family Education  Outcome: Progressing Towards Goal     Problem: Patient Education: Go to Patient Education Activity  Goal: Patient/Family Education  Outcome: Progressing Towards Goal     Problem: Ischemic Stroke: Discharge Outcomes  Goal: *Verbalizes anxiety and depression are reduced or absent  Outcome: Progressing Towards Goal  Goal: *Verbalize understanding of risk factor modification(Stroke Metric)  Outcome: Progressing Towards Goal  Goal: *Hemodynamically stable  Outcome: Progressing Towards Goal  Goal: *Absence of aspiration pneumonia  Outcome: Progressing Towards Goal  Goal: *Aware of needed dietary changes  Outcome: Progressing Towards Goal  Goal: *Verbalize understanding of prescribed medications including anti-coagulants, anti-lipid, and/or anti-platelets(Stroke Metric)  Outcome: Progressing Towards Goal  Goal: *Tolerating diet  Outcome: Progressing Towards Goal  Goal: *Aware of follow-up diagnostics related to anticoagulants  Outcome: Progressing Towards Goal  Goal: *Ability to perform ADLs and demonstrates progressive mobility and function  Outcome: Progressing Towards Goal  Goal: *Absence of DVT(Stroke Metric)  Outcome: Progressing Towards Goal  Goal: *Absence of aspiration  Outcome: Progressing Towards Goal  Goal: *Optimal pain control at patient's stated goal  Outcome: Progressing Towards Goal  Goal: *Home safety concerns addressed  Outcome: Progressing Towards Goal  Goal: *Describes available resources and support systems  Outcome: Progressing Towards Goal  Goal: *Verbalizes understanding of activation of EMS(911) for stroke symptoms(Stroke Metric)  Outcome: Progressing Towards Goal  Goal: *Understands and describes signs and symptoms to report to providers(Stroke Metric)  Outcome: Progressing Towards Goal  Goal: *Neurolgocially stable (absence of additional neurological deficits)  Outcome: Progressing Towards Goal  Goal: *Verbalizes importance of follow-up with primary care physician(Stroke Metric)  Outcome: Progressing Towards Goal  Goal: *Smoking cessation discussed,if applicable(Stroke Metric)  Outcome: Progressing Towards Goal  Goal: *Depression screening completed(Stroke Metric)  Outcome: Progressing Towards Goal     Problem: Pain  Goal: *Control of Pain  Outcome: Progressing Towards Goal     Problem: Patient Education: Go to Patient Education Activity  Goal: Patient/Family Education  Outcome: Progressing Towards Goal     Problem: Patient Education: Go to Patient Education Activity  Goal: Patient/Family Education  Outcome: Progressing Towards Goal

## 2020-05-01 NOTE — PROGRESS NOTES
Hospitalist Progress Note     Admit Date:  2019  9:07 AM   Name:  Hazel Waldron   Age:  53 y.o.  :  1975   MRN:  [id number]   PCP:  Marco Hoyos MD  Treatment Team: Attending Provider: Neva Joseph DO; Care Manager: Kayode Josue LMSW; Utilization Review: Billie Olmedo RN; Nurse Practitioner: Micheline De Paz NP; Care Manager: Julius Maria RN; Hospitalist: Rekha Johnson NP; Charge Nurse: Carmen Lema    Subjective:   HPI and or CC:  54 yo AA male with PMH of DM, HTN admitted  for CVA affecting numerous areas of the brain. He was placed on ASA and statin. He has remained alert and oriented x3. Some movement to right side but unable to grasp with right hand. He is currently waiting on placement and this has remained difficult due to waiting on Medicaid. :  I spoke with cardiology yesterday, they are okay with waiting for patient to be placed before proceeding with PFO closure. No change in patient. Per PT recommendations, orthotist consult for AFO for L LE. Alert and oriented x3. Voices no complaints today. Continues to wait for placement. Continues to wait on SSI and Medicaid approval.  He remains afebrile.       Objective:     Patient Vitals for the past 24 hrs:   Temp Pulse Resp BP SpO2   20 0711 97.8 °F (36.6 °C) 74 18 127/75 98 %   20 0541 97.7 °F (36.5 °C) 72 18 116/76 92 %   20 0008 97.8 °F (36.6 °C) 73 18 133/87 93 %   20 2014 98.1 °F (36.7 °C) 75 18 128/78 94 %   20 1600 98 °F (36.7 °C) 74 18 126/76 99 %   20 1150 97.4 °F (36.3 °C) 81 18 (!) 142/91 98 %   20 0742 97.3 °F (36.3 °C) 68 18 136/82 98 %     Oxygen Therapy  O2 Sat (%): 98 % (20 0711)  Pulse via Oximetry: 75 beats per minute (20 0400)  O2 Device: Room air (20 1648)  No intake or output data in the 24 hours ending 20 0739      REVIEW OF SYSTEMS: Comprehensive ROS performed and negative except as stated in HPI.    Physical Examination:  General:       No acute distress    Lungs:          CTA bilaterally. Resp even and nonlabored  Heart:            S1S2 present without murmurs rubs gallops. RRR. No LE edema  Abdomen:    Soft, non tender, non distended. BS present  Extremities: No cyanosis. Left sided hemiparesis  Neurologic:  moves right hand and raises arm at elbow moderately, unable to squeeze my fingers left hand, mildly slurred speech.  PERRLA, strength 4/5 RUE/RLE. Data Review:  I have reviewed all labs, meds, telemetry events, and studies from the last 24 hours. No results found for this or any previous visit (from the past 24 hour(s)).      All Micro Results     None          Current Meds:  Current Facility-Administered Medications   Medication Dose Route Frequency    metFORMIN (GLUCOPHAGE) tablet 500 mg  500 mg Oral BID WITH MEALS    magnesium oxide (MAG-OX) tablet 400 mg  400 mg Oral DAILY    acetaminophen (TYLENOL) tablet 650 mg  650 mg Oral Q4H PRN    diphenhydrAMINE (BENADRYL) capsule 25 mg  25 mg Oral Q6H PRN    acetaminophen (TYLENOL) tablet 650 mg  650 mg Oral Q8H    lip protectant (BLISTEX) ointment 1 Each  1 Each Topical PRN    metoprolol succinate (TOPROL-XL) XL tablet 25 mg  25 mg Oral DAILY    polyethylene glycol (MIRALAX) packet 17 g  17 g Oral DAILY PRN    guaiFENesin (ROBITUSSIN) 100 mg/5 mL oral liquid 100 mg  100 mg Oral Q4H PRN    hydrALAZINE (APRESOLINE) 20 mg/mL injection 20 mg  20 mg IntraVENous Q4H PRN    atorvastatin (LIPITOR) tablet 80 mg  80 mg Oral QHS    lisinopril (PRINIVIL, ZESTRIL) tablet 40 mg  40 mg Oral DAILY    sodium chloride (NS) flush 5-40 mL  5-40 mL IntraVENous PRN    ondansetron (ZOFRAN) injection 4 mg  4 mg IntraVENous Q4H PRN    aspirin chewable tablet 81 mg  81 mg Oral DAILY    enoxaparin (LOVENOX) injection 40 mg  40 mg SubCUTAneous Q24H    dextrose 40% (GLUTOSE) oral gel 1 Tube  15 g Oral PRN    glucagon (GLUCAGEN) injection 1 mg  1 mg IntraMUSCular PRN    dextrose (D50W) injection syrg 12.5-25 g  25-50 mL IntraVENous PRN       Diet:  DIET NUTRITIONAL SUPPLEMENTS  DIET DIABETIC CONSISTENT CARB    Other Studies (last 24 hours):  No results found. Assessment and Plan:     Hospital Problems as of 5/1/2020 Date Reviewed: 3/3/2017          Codes Class Noted - Resolved POA    Hypomagnesemia ICD-10-CM: E83.42  ICD-9-CM: 275.2  3/7/2020 - Present Unknown        PFO (patent foramen ovale) ICD-10-CM: Q21.1  ICD-9-CM: 745.5  12/17/2019 - Present Yes        Arrhythmia ICD-10-CM: I49.9  ICD-9-CM: 427.9  12/15/2019 - Present Yes        Hyperlipemia ICD-10-CM: E78.5  ICD-9-CM: 272.4  12/15/2019 - Present Yes        * (Principal) Acute embolic stroke ([facility] [address] [id number]: I63.9  ICD-9-CM: 434.11  12/12/2019 - Present Yes        Hypertension (Chronic) ICD-10-CM: I10  ICD-9-CM: 401.9  Unknown - Present Yes        Type 2 diabetes mellitus (HCC) (Chronic) ICD-10-CM: E11.9  ICD-9-CM: 250.00  Unknown - Present Yes              A/P:    Acute embolic stroke  MRI brain showed acute to early subacute infarcts in the left cerebellar hemisphere, in the periventricular deep left frontoparietal white matter and likely additional areas of restricted diffusion in the right paramedian yariel. +PFO on echo  On ASA, statin  Per PT recommendations, orthotist consult for AFO for L LE. Will need shunt closure once discharged-  4/30-I spoke with cardiology, they state PFO closure can wait until patient discharged to a facility.     Hypomagnesemia  Resolved       Hypertension  Continue metoprolol and ACE inhibitor  Goal BP <130/80     Type 2 diabetes mellitus:    Monitor, BGL controlled        Signed:  Charity Monte NP

## 2020-05-01 NOTE — PROGRESS NOTES
05/01/20 1909   NIH Stroke Scale   Interval Other (comment)   LOC 0   LOC Questions 0   LOC Commands 0   Best Gaze 0   Visual 0   Facial Palsy 1   Motor Right Arm 0   Motor Left Arm 2   Motor Right Leg 0   Motor Left Leg 2   Limb Ataxia 0   Sensory 0   Best Language 0   Dysarthria 1   Extinction and Inattention 0   Total 6

## 2020-05-01 NOTE — PROGRESS NOTES
Problem: Mobility Impaired (Adult and Pediatric)  Goal: *Acute Goals and Plan of Care  Description  GOALS UPDATED ON RE-ASSESSMENT 4/27/2020 (BOLD INDICATES UPDATED GOAL):  1. Patient will perform bed mobility with MODIFIED INDEPENDENCE within 7 treatment days. 2. Patient will perform transfer bed to chair with MODIFIED INDEPENDENCE within 7 treatment days. 3. Patient will demonstrate fair+ dynamic balance throughout stance phase on L LE within 7 treatment days. 4. Patient will require STAND BY ASSIST throughout swing phase on (to advance) L LE within 7 treatment days. 5. Patient demonstrate 3+/5 strength in L LE hip flexion, hip abduction, and hip adduction within 7 treatment days. 6. Patient will ambulate 200ft+ with STAND BY ASSIST and least retrictive assistive device within 7 treatment days. 7. Patient will perform wheelchair mobility 150 ft with modified independence within 7 treatment days. 8. Mr. Jonathon Smith will don/doff LUE sling for protection of L shoulder during transfers/gait with set up within 7 treatment days. 9. Pt's spouse / caregiver will demonstrate SBA guarding during transfers and household level (150ft) ambulation within 7 treatment days. PHYSICAL THERAPY: Daily Note and PM 5/1/2020  INPATIENT: PT Visit Days : 4  Payor: [id number] / Plan: [place] MEDICAID Formerly [facility] / Product Type: Medicaid /       NAME/AGE/GENDER: Shahana Mcdaniel is a 53 y.o. male   PRIMARY DIAGNOSIS: Acute ischemic stroke ([address] [I63.9]  Cerebrovascular accident (CVA) due to embolism ([address] [A70.3] Acute embolic stroke ([address] Acute embolic stroke ([address]       ICD-10: Treatment Diagnosis:   · Difficulty in walking, Not elsewhere classified (R26.2)  · Hemiplegia and hemiparesis following cerebral infarction affecting left non-dominant side (S63.052)   Precaution/Allergies:  Patient has no known allergies. ASSESSMENT:     Mr. Henriquez is a 53year old admitted R MCA CVA.  Patient seen this PM for PT treatment session: presents supine in bed, endorses 0/10 pain, and agreeable to treatment. Patient appeared a little down and more flat today in affect. Encouragement provided. Required additional assistance for bed mobility, minimal assistance and cues for dynamic sitting balance. Denied fatigue. Trialed left platform walker to promote weightbearing through proximal LUE, midline balance point, and in an attempt to improve gait mechanics. Gait with platform walker demonstrated a hemiplegic pattern with decreased circumduction on left, improved knee and hip flexion, and fair to fair- dynamic balance. Patient required cues for gait mechanics as well as occasional manual assistance with walker negotiation. Gait training 2x75ft with platform walker then an additional 150ft with luke walker. Required verbal cues to prevent hyperextension of L LE. Did appear to improve weight bearing through L LE and left right lateral trunk sway. Wheelchair mobility and x25ft. Good participation today; appeared slightly more fatigue then previous treatment sessions. Encouraged by improved L LE mechanics with use of platform RW. Goals appropriate and ongoing. Orthotist measured patient to L LE AFO today. Will arrive in 2-3 days. Will continue with stated plan of care and utilize AFO once it arrives. This section established at most recent assessment   PROBLEM LIST (Impairments causing functional limitations):  1. Decreased Strength  2. Decreased ADL/Functional Activities  3. Decreased Transfer Abilities  4. Decreased Ambulation Ability/Technique  5. Decreased Balance  6. Increased Pain  7. Decreased Activity Tolerance  8. Increased Fatigue  9. Decreased Flexibility/Joint Mobility   INTERVENTIONS PLANNED: (Benefits and precautions of physical therapy have been discussed with the patient.)  1. Balance Exercise  2. Bed Mobility  3. Family Education  4. Gait Training  5. Home Exercise Program (HEP)  6.  Manual Therapy  7. Neuromuscular Re-education/Strengthening  8. Range of Motion (ROM)  9. Therapeutic Activites  10. Therapeutic Exercise/Strengthening  11. Transfer Training     TREATMENT PLAN: Frequency/Duration: 5 times a week for duration of hospital stay  Rehabilitation Potential For Stated Goals: Good     REHAB RECOMMENDATIONS (at time of discharge pending progress):    Placement: It is my opinion, based on this patient's performance to date, that Mr. Anoop Myers may benefit from intensive therapy at an [address] [place] [address] after discharge due to a probable need for close medical supervision by a rehab physician, a probable need for multiple therapy disciplines and potential to make ongoing and sustainable functional improvement that is of practical value. .  Equipment:    TBD pending progress with therapy. HISTORY:   History of Present Injury/Illness (Reason for Referral):  Per H&P: \"Pt is a 54 y/o smoker with DM, HTN, who presented to ER with L leg and arm weakness, L facial droop, dysarthria. First noted L leg weakness late night 12/11 when he woke up to go to the bathroom. He was normal when he went to bed around 1030. Woke up this morning and had persistent weakness L leg and also now noted in L arm. EMS called. Noted to have slurred speech and L facial droop as well. Code S called in ER around 9am.  MRI with acute infarcts in L cerebellar hemisphere, deep frontoparietal white matter, and R paramedian yariel. Also noted old lacunar infarcts. No large vessel occlusion on CTA, but some stenosis noted. No hx afib, TIA, CVA. No CP, palpitations, SOB. \"  Past Medical History/Comorbidities:   Mr. Anoop Myers  has a past medical history of Acute ischemic stroke ([address] (12/12/2019), Acute pancreatitis (11/19/2014), Cerebrovascular accident (CVA) due to embolism ([address] (12/12/2019), Diabetes ([address] (2002), Diabetes ([address] Diabetes mellitus, and Hypertension.  He also has no past medical history of Arthritis, Asthma, Autoimmune disease ([facility] CAD (coronary artery disease), Cancer ([facility] Chronic kidney disease, COPD, Dementia, Dementia ([facility] Heart failure ([facility] Ill-defined condition, Infectious disease, Liver disease, Other ill-defined conditions(799.89), Psychiatric disorder, PUD (peptic ulcer disease), Seizures ([facility] or Sleep disorder. Mr. Kyung Cisse  has a past surgical history that includes hx hernia repair and hx orthopaedic. Social History/Living Environment:   Home Environment: Apartment  # Steps to Enter: 12  Rails to Enter: Yes  [employer] : Bilateral  One/Two Story Residence: One story  Living Alone: No  Support Systems: Spouse/Significant Other/Partner  Patient Expects to be Discharged to[de-identified] Unknown  Current DME Used/Available at Home: None  Tub or Shower Type: Tub/Shower combination  Prior Level of Function/Work/Activity:  Independent, lives with wife in 2nd story 1 level apartment. No recent falls. Number of Personal Factors/Comorbidities that affect the Plan of Care: 1-2: MODERATE COMPLEXITY   EXAMINATION:   Most Recent Physical Functioning:   Gross Assessment: left hip grossly 2/5 to 3-/5  AROM: Generally decreased, functional  PROM: Within functional limits  Strength: Generally decreased, functional  Coordination: Generally decreased, functional  Tone: Abnormal(L UE; L LE)  Sensation: Intact                    Balance:  Sitting: Impaired  Sitting - Static: Fair (occasional)  Sitting - Dynamic: Fair (occasional)  Standing: Impaired  Standing - Static: Good  Standing - Dynamic : Fair Bed Mobility:  Supine to Sit: Minimum assistance  Scooting: Minimum assistance  Wheelchair Mobility: NT     Transfers:  Sit to Stand: Contact guard assistance;Minimum assistance  Stand to Sit: Contact guard assistance  Interventions: Safety awareness training; Tactile cues; Verbal cues  Gait:     Base of Support: Narrowed  Swing Pattern: Right asymmetrical  Stance: Left increased  Gait Abnormalities: Hemiplegic  Distance (ft): 150 Feet (ft)(75ft x2 with platform; 150ft with luke walker)  Assistive Device: Walker luke;Other (comment);Gait belt(Platform walker )  Ambulation - Level of Assistance: Minimal assistance;Contact guard assistance      Body Structures Involved:  1. Nerves  2. Voice/Speech  3. Bones  4. Joints  5. Muscles Body Functions Affected:  1. Mental  2. Sensory/Pain  3. Neuromusculoskeletal  4. Movement Related Activities and Participation Affected:  1. General Tasks and Demands  2. Mobility  3. Self Care  4. Interpersonal Interactions and Relationships   Number of elements that affect the Plan of Care: 4+: HIGH COMPLEXITY   CLINICAL PRESENTATION:   Presentation: Evolving clinical presentation with changing clinical characteristics: MODERATE COMPLEXITY   CLINICAL DECISION MAKIN [facility] Mobility Inpatient Short Form  How much difficulty does the patient currently have. .. Unable A Lot A Little None   1. Turning over in bed (including adjusting bedclothes, sheets and blankets)? [] 1   [] 2   [] 3   [x] 4   2. Sitting down on and standing up from a chair with arms ( e.g., wheelchair, bedside commode, etc.)   [] 1   [] 2   [x] 3   [] 4   3. Moving from lying on back to sitting on the side of the bed? [] 1   [] 2   [] 3   [x] 4   How much help from another person does the patient currently need. .. Total A Lot A Little None   4. Moving to and from a bed to a chair (including a wheelchair)? [] 1   [] 2   [x] 3   [] 4   5. Need to walk in hospital room? [] 1   [] 2   [x] 3   [] 4   6. Climbing 3-5 steps with a railing? [] 1   [x] 2   [] 3   [] 4   © , Trustees of [address] [place] [address] [address] [id number], under license to [employer]. All rights reserved      Score:  Initial: 13 Most Recent: 19 (Date: 2020)    Interpretation of Tool:  Represents activities that are increasingly more difficult (i.e. Bed mobility, Transfers, Gait).     Medical Necessity:     · Patient is expected to demonstrate progress in   · strength, range of motion, balance, coordination, and functional technique  ·  to   · increase independence with all mobility. · .  Reason for Services/Other Comments:  · Patient continues to require skilled intervention due to   · medical complications and mobility deficits which impact his level of function, safety, and independence as indicated above. · .   Use of outcome tool(s) and clinical judgement create a POC that gives a: Questionable prediction of patient's progress: MODERATE COMPLEXITY        TREATMENT:      Pre-treatment Symptoms/Complaints: see above  Pain: Initial: 0/10 Post Session:  0/10       Gait Training (45 Minutes):  Gait training to improve and/or restore physical functioning as related to mobility, strength, balance and coordination. Ambulated 150 Feet (ft)(75ft x2 with platform; 150ft with luke walker) with minimal assistance/contact guard assist and moderate visual, verbal and tactile cues related to their stance phase, ankle position and motion, knee position and motion and pelvis position and motionto promote proper body alignment, promote proper body posture and promote proper body mechanics. Assistive Device: Walker luke, Other (comment), Gait belt(Platform walker )  Ambulation - Level of Assistance: Minimal assistance, Contact guard assistance  Distance (ft): 150 Feet (ft)(75ft x2 with platform; 150ft with luke walker)  Rail Use: Right   Interventions: Safety awareness training, Tactile cues, Verbal cues  Duration: 25 Minutes     Braces/Orthotics/Lines/Etc:   · Sling to LUE  Treatment/Session Assessment:    · Response to Treatment:  See above  · Interdisciplinary Collaboration:   o Physical Therapist  o Registered Nurse  o Rehabilitation Attendant  · After treatment position/precautions:   o With ambulation team in [facility] for tranport off of the floor to the garden.     · Compliance with Program/Exercises: Compliant all of the time  · Recommendations/Intent for next treatment session: \"Next visit will focus on advancements to more challenging activities and reduction in assistance provided\".     Total Treatment Duration:  PT Patient Time In/Time Out  Time In: 1335  Time Out: Ro Lee 78, DPT

## 2020-05-02 PROCEDURE — 65270000029 HC RM PRIVATE

## 2020-05-02 PROCEDURE — 74011250637 HC RX REV CODE- 250/637: Performed by: INTERNAL MEDICINE

## 2020-05-02 PROCEDURE — 97530 THERAPEUTIC ACTIVITIES: CPT

## 2020-05-02 PROCEDURE — 74011250637 HC RX REV CODE- 250/637: Performed by: NURSE PRACTITIONER

## 2020-05-02 PROCEDURE — 74011250637 HC RX REV CODE- 250/637: Performed by: HOSPITALIST

## 2020-05-02 PROCEDURE — 74011250636 HC RX REV CODE- 250/636: Performed by: INTERNAL MEDICINE

## 2020-05-02 RX ORDER — LANOLIN ALCOHOL/MO/W.PET/CERES
400 CREAM (GRAM) TOPICAL 2 TIMES DAILY
Status: DISCONTINUED | OUTPATIENT
Start: 2020-05-03 | End: 2020-06-10 | Stop reason: HOSPADM

## 2020-05-02 RX ADMIN — METOPROLOL SUCCINATE 25 MG: 25 TABLET, FILM COATED, EXTENDED RELEASE ORAL at 08:20

## 2020-05-02 RX ADMIN — ACETAMINOPHEN 650 MG: 325 TABLET, FILM COATED ORAL at 21:04

## 2020-05-02 RX ADMIN — METFORMIN HYDROCHLORIDE 500 MG: 500 TABLET ORAL at 16:37

## 2020-05-02 RX ADMIN — LISINOPRIL 40 MG: 20 TABLET ORAL at 08:20

## 2020-05-02 RX ADMIN — GUAIFENESIN 100 MG: 100 SOLUTION ORAL at 21:05

## 2020-05-02 RX ADMIN — DIPHENHYDRAMINE HYDROCHLORIDE 25 MG: 25 CAPSULE ORAL at 21:04

## 2020-05-02 RX ADMIN — METFORMIN HYDROCHLORIDE 500 MG: 500 TABLET ORAL at 08:20

## 2020-05-02 RX ADMIN — ACETAMINOPHEN 650 MG: 325 TABLET, FILM COATED ORAL at 14:36

## 2020-05-02 RX ADMIN — ACETAMINOPHEN 650 MG: 325 TABLET, FILM COATED ORAL at 06:44

## 2020-05-02 RX ADMIN — GUAIFENESIN 100 MG: 100 SOLUTION ORAL at 08:20

## 2020-05-02 RX ADMIN — ASPIRIN 81 MG 81 MG: 81 TABLET ORAL at 08:20

## 2020-05-02 RX ADMIN — ATORVASTATIN CALCIUM 80 MG: 80 TABLET, FILM COATED ORAL at 21:05

## 2020-05-02 RX ADMIN — DIPHENHYDRAMINE HYDROCHLORIDE 25 MG: 25 CAPSULE ORAL at 08:20

## 2020-05-02 RX ADMIN — ENOXAPARIN SODIUM 40 MG: 40 INJECTION SUBCUTANEOUS at 14:35

## 2020-05-02 RX ADMIN — Medication 400 MG: at 08:20

## 2020-05-02 NOTE — PROGRESS NOTES
Reviewed notes for spiritual concerns prior to visit  Visit with patient to build rapport with . Calm  Encouraged with presence and words of hope    Patient demonstrates confidence in the care team.  Appears to be coping with illness.     No physical contact with patient per guidelines  Assurance of ongoing prayers    Patient has been here since dec 18th    Kellie Seaman,  Staff   C: [phone]  /  [email or web address]

## 2020-05-02 NOTE — PROGRESS NOTES
Hospitalist Progress Note    Subjective:   Daily Progress Note: 5/2/2020  1430     Patient admitted 12/12/19 with acute right side embolic stroke with left side residual weakness and left facial droop. CT brain on admission with indeterminate infarctions within the left corona radiata/caudate.  CTA of the head and neck with stenosis at the distal segment of the right  Vertebral artery and multifocal stenosis in the P2 segment of the left posterior cerebral artery.  MRI brain with acute to early subacute infarcts in the left cerebellar hemisphere in the periventricular deep left frontoparietal white matter and likely additional areas of restricted diffusion in the right paramedian yariel. Echo with + PFO, on statin and ASA.  Will need event monitor on discharge and if no arrhythmia, PFO closure recommended.  Wife informs CM she does not want patient at home, they were planning to separate prior to this stroke.  CM attempting  to place in STR, medicaid pending.    4/9:  Speech remains slightly slurred, SLP signing off as clinical indication no longer needed.    4/10: Mag critically low today: 1.3. Replaced   4/14:  Continues to wait for placement.  Good participation with PT/OT. Paolo Lloyd a little use of right hand, still unable to .      5/2:  Fitted for AFO today per orthotist.  Sitting up in bed, alert, conversant today. No complaints. Continued wait for medicaid and SSI approval.  Metformin dosing adjusted a few days ago. Glucose 118-126 x 24 hours.       ADDITIONAL HISTORY:  DM II, HTN, pancreatitis  Current Facility-Administered Medications   Medication Dose Route Frequency    metFORMIN (GLUCOPHAGE) tablet 500 mg  500 mg Oral BID WITH MEALS    magnesium oxide (MAG-OX) tablet 400 mg  400 mg Oral DAILY    acetaminophen (TYLENOL) tablet 650 mg  650 mg Oral Q4H PRN    diphenhydrAMINE (BENADRYL) capsule 25 mg  25 mg Oral Q6H PRN    acetaminophen (TYLENOL) tablet 650 mg  650 mg Oral Q8H    lip protectant (BLISTEX) ointment 1 Each  1 Each Topical PRN    metoprolol succinate (TOPROL-XL) XL tablet 25 mg  25 mg Oral DAILY    polyethylene glycol (MIRALAX) packet 17 g  17 g Oral DAILY PRN    guaiFENesin (ROBITUSSIN) 100 mg/5 mL oral liquid 100 mg  100 mg Oral Q4H PRN    hydrALAZINE (APRESOLINE) 20 mg/mL injection 20 mg  20 mg IntraVENous Q4H PRN    atorvastatin (LIPITOR) tablet 80 mg  80 mg Oral QHS    lisinopril (PRINIVIL, ZESTRIL) tablet 40 mg  40 mg Oral DAILY    sodium chloride (NS) flush 5-40 mL  5-40 mL IntraVENous PRN    ondansetron (ZOFRAN) injection 4 mg  4 mg IntraVENous Q4H PRN    aspirin chewable tablet 81 mg  81 mg Oral DAILY    enoxaparin (LOVENOX) injection 40 mg  40 mg SubCUTAneous Q24H    dextrose 40% (GLUTOSE) oral gel 1 Tube  15 g Oral PRN    glucagon (GLUCAGEN) injection 1 mg  1 mg IntraMUSCular PRN    dextrose (D50W) injection syrg 12.5-25 g  25-50 mL IntraVENous PRN        Review of Systems  A comprehensive review of systems was negative except for that written in the HPI. Objective:     Visit Vitals  /84 (BP 1 Location: Right arm, BP Patient Position: At rest;Pre-activity)   Pulse 79   Temp 98 °F (36.7 °C)   Resp 17   Ht 5' 6\" (1.676 m)   Wt 79.8 kg (175 lb 14.4 oz)   SpO2 97%   BMI 28.39 kg/m²      O2 Device: Room air    Temp (24hrs), Av.8 °F (36.6 °C), Min:97.4 °F (36.3 °C), Max:98 °F (36.7 °C)    General appearance: Oriented and alert, cooperative, denies need or new issues.  In good spirits.    Head: Normocephalic, without obvious abnormality, atraumatic  Eyes: conjunctivae/corneas clear. PERRL  Throat: Continued mild left facial weakness.  Lips, mucosa, and tongue normal. Teeth and gums normal  Neck: supple, symmetrical, trachea midline, no JVD  Lungs: clear to auscultation bilaterally  Heart: regular rate and rhythm, S1, S2 normal, no murmur, click, rub or gallop  Abdomen: soft, non-tender.  Bowel sounds normal. No masses,  no organomegaly  Extremities: Right lower and upper extremities normal, atraumatic, no cyanosis or edema.  Persistent left side residual weakness, both upper and lower extremities on left weak. Skin: Skin color, texture, turgor normal. No rashes or lesions  Neurologic: As above.     Additional comments: Notes,orders, test results, vitals reviewed    Data Review  LAST LABS:  4/21:     Ref. Range 4/21/2020 04:19   WBC Latest Ref Range: 4.3 - 11.1 K/uL 5.1   RBC Latest Ref Range: 4.23 - 5.6 M/uL 3.94 (L)   HGB Latest Ref Range: 13.6 - 17.2 g/dL 10.4 (L)   HCT Latest Ref Range: 41.1 - 50.3 % 33.2 (L)   MCV Latest Ref Range: 79.6 - 97.8 FL 84.3   MCH Latest Ref Range: 26.1 - 32.9 PG 26.4   MCHC Latest Ref Range: 31.4 - 35.0 g/dL 31.3 (L)   RDW Latest Ref Range: 11.9 - 14.6 % 16.4 (H)   PLATELET Latest Ref Range: 150 - 450 K/uL 170   MPV Latest Ref Range: 9.4 - 12.3 FL 12.0   NEUTROPHILS Latest Ref Range: 43 - 78 % 42 (L)   LYMPHOCYTES Latest Ref Range: 13 - 44 % 49 (H)   MONOCYTES Latest Ref Range: 4.0 - 12.0 % 7   EOSINOPHILS Latest Ref Range: 0.5 - 7.8 % 2   BASOPHILS Latest Ref Range: 0.0 - 2.0 % 1   IMMATURE GRANULOCYTES Latest Ref Range: 0.0 - 5.0 % 0   DF Latest Units:   AUTOMATED   ABSOLUTE NRBC Latest Ref Range: 0.0 - 0.2 K/uL 0.00   ABS. NEUTROPHILS Latest Ref Range: 1.7 - 8.2 K/UL 2.2   ABS. IMM. GRANS. Latest Ref Range: 0.0 - 0.5 K/UL 0.0   ABS. LYMPHOCYTES Latest Ref Range: 0.5 - 4.6 K/UL 2.5   ABS. MONOCYTES Latest Ref Range: 0.1 - 1.3 K/UL 0.3   ABS. EOSINOPHILS Latest Ref Range: 0.0 - 0.8 K/UL 0.1   ABS.  BASOPHILS Latest Ref Range: 0.0 - 0.2 K/UL 0.0   Sodium Latest Ref Range: 136 - 145 mmol/L 142   Potassium Latest Ref Range: 3.5 - 5.1 mmol/L 4.0   Chloride Latest Ref Range: 98 - 107 mmol/L 109 (H)   CO2 Latest Ref Range: 21 - 32 mmol/L 25   Anion gap Latest Ref Range: 7 - 16 mmol/L 8   Glucose Latest Ref Range: 65 - 100 mg/dL 87   BUN Latest Ref Range: 6 - 23 MG/DL 20   Creatinine Latest Ref Range: 0.8 - 1.5 MG/DL 1.01   Calcium Latest Ref Range: 8.3 - 10.4 MG/DL 9.3   Magnesium Latest Ref Range: 1.8 - 2.4 mg/dL 1.6 (L)   GFR est non-AA Latest Ref Range: >60 ml/min/1.73m2 >60   GFR est AA Latest Ref Range: >60 ml/min/1.73m2 >60   Bilirubin, total Latest Ref Range: 0.2 - 1.1 MG/DL 0.5   Protein, total Latest Ref Range: 6.3 - 8.2 g/dL 6.6   Albumin Latest Ref Range: 3.5 - 5.0 g/dL 2.7 (L)   Globulin Latest Ref Range: 2.3 - 3.5 g/dL 3.9 (H)   A-G Ratio Latest Ref Range: 1.2 - 3.5   0.7 (L)   ALT (SGPT) Latest Ref Range: 12 - 65 U/L 18   AST Latest Ref Range: 15 - 37 U/L 15   Alk. phosphatase Latest Ref Range: 50 - 136 U/L 67     12/12/19:  CT HEAD:  Age indeterminate infarctions within the left corona radiata/caudate. Mild chronic small vessel ischemic changes and areas of remote infarction as described. Probable partial volume averaging the region of the left thalamus rather than intraparenchymal hemorrhage.     12/12/19:  CTA HEAD AND NECK:      Stenosis at the distal segment of the right vertebral artery and multifocal  stenosis in the P2 segment of the left posterior cerebral artery.     12/12/19:  MRI BRAIN:  Acute to early subacute infarcts in the left cerebellar hemisphere, in the periventricular deep left frontoparietal white matter and likely additional areas of restricted diffusion in the right paramedian yariel. Old lacunar infarcts in the corpus striatum and periventricular white matter as well as within the left corona radiata.     1/8/20:  SWALLOW FUNCTION VIDEO: Small quantity aspiration with straw sips of thin liquids.     Assessment/Plan:   Acute to early subacute infarcts in the left cerebellar hemisphere, in the periventricular deep left frontoparietal white matter and likely additional areas of restricted diffusion in the right paramedian yariel.   Old lacunar infarcts also.                  Making good progress with PT/OT               Speech and swallowing improved to the extent  SLP  signed off              Pending placement after approved for SSI and  medicaid     Dysarthria due to stroke:  Improved immensely              SLP releasing 4/14 due to improved without need  for further intervention      Difficult placement with marital discord prior to stroke:  Wife does not want him at home:  CM working on tirelessly              Now issues obtaining MR for [employer] application     CM spoke with wife 5/1, she still does not want  patient to come home. Hypertension:  Continue lisinopril, toprol XL     IDDM II:  Continue glucophage, diabetic diet. A1C: 8.4, rechecking               adding SSI 4/15              DM education and management continues to follow  intermittently, increased glucophage frequency 4/30     Arrhythmia:  On beta blocker     PFO 12/17/2019:     Will need 4 week event monitor on discharge    Will need PFO closure at some point after  discharge per Cards     Hypomagnesemia:  Replace and recheck    Increasing daily 400 mg dose to bid 5/2.    Recheck mag next week.       Plan discussed with RN, Patient     Signed By: lEsy Klein NP     May 2, 2020

## 2020-05-02 NOTE — PROGRESS NOTES
Problem: Mobility Impaired (Adult and Pediatric)  Goal: *Acute Goals and Plan of Care  Description  GOALS UPDATED ON RE-ASSESSMENT 4/27/2020 (BOLD INDICATES UPDATED GOAL):  1. Patient will perform bed mobility with MODIFIED INDEPENDENCE within 7 treatment days. 2. Patient will perform transfer bed to chair with MODIFIED INDEPENDENCE within 7 treatment days. 3. Patient will demonstrate fair+ dynamic balance throughout stance phase on L LE within 7 treatment days. 4. Patient will require STAND BY ASSIST throughout swing phase on (to advance) L LE within 7 treatment days. 5. Patient demonstrate 3+/5 strength in L LE hip flexion, hip abduction, and hip adduction within 7 treatment days. 6. Patient will ambulate 200ft+ with STAND BY ASSIST and least retrictive assistive device within 7 treatment days. 7. Patient will perform wheelchair mobility 150 ft with modified independence within 7 treatment days. 8. Mr. Papito Ellison will don/doff LUE sling for protection of L shoulder during transfers/gait with set up within 7 treatment days. 9. Pt's spouse / caregiver will demonstrate SBA guarding during transfers and household level (150ft) ambulation within 7 treatment days. PHYSICAL THERAPY: Daily Note and AM 5/2/2020  INPATIENT: PT Visit Days : 5  [id number] [address] / Plan: [address] [place] [address] Avenue / Product Type: Medicaid /       NAME/AGE/GENDER: Gifty Johnson is a 53 y.o. male   PRIMARY DIAGNOSIS: Acute ischemic stroke ([address] [I63.9]  Cerebrovascular accident (CVA) due to embolism ([address] [O53.8] Acute embolic stroke ([address] Acute embolic stroke ([address]       ICD-10: Treatment Diagnosis:   · Difficulty in walking, Not elsewhere classified (R26.2)  · Hemiplegia and hemiparesis following cerebral infarction affecting left non-dominant side (Z04.578)   Precaution/Allergies:  Patient has no known allergies. ASSESSMENT:     Mr. Henriquez is a 53year old admitted R MCA CVA.  Patient was supine upon contact and agreeable to PT. Patient performs supine to sit with SBA, additional time and cues for technique. Patient had difficulty scooting hips to edge of bed needing min assist to complete task. Patient dons sling to LUE with min assist and cues for technique. Patient transfers to standing with CGA-SBA and cues for technique. Once standing patient ambulates 400' with luke walker, CGA-SBA and cues for LLE positioning/gait mechanics. Lunch arrived so patient was rolled back to his room. Patient transfers from wheelchair back to bed with SBA. Once seated on EOB patient doffs sling independently. Patient returns to supine with supervision. Overall steady progress towards physical therapy goals. Patient's goals listed above are still appropriate. Will continue skilled PT to address remaining deficits. Orthotist measured patient to L LE AFO today. Will arrive in 2-3 days. Will continue with stated plan of care and utilize AFO once it arrives. This section established at most recent assessment   PROBLEM LIST (Impairments causing functional limitations):  1. Decreased Strength  2. Decreased ADL/Functional Activities  3. Decreased Transfer Abilities  4. Decreased Ambulation Ability/Technique  5. Decreased Balance  6. Increased Pain  7. Decreased Activity Tolerance  8. Increased Fatigue  9. Decreased Flexibility/Joint Mobility   INTERVENTIONS PLANNED: (Benefits and precautions of physical therapy have been discussed with the patient.)  1. Balance Exercise  2. Bed Mobility  3. Family Education  4. Gait Training  5. Home Exercise Program (HEP)  6. Manual Therapy  7. Neuromuscular Re-education/Strengthening  8. Range of Motion (ROM)  9. Therapeutic Activites  10. Therapeutic Exercise/Strengthening  11.  Transfer Training     TREATMENT PLAN: Frequency/Duration: 5 times a week for duration of hospital stay  Rehabilitation Potential For Stated Goals: Good     REHAB RECOMMENDATIONS (at time of discharge pending progress):    Placement: It is my opinion, based on this patient's performance to date, that Mr. Sneha Munguia may benefit from intensive therapy at an [facility] after discharge due to a probable need for close medical supervision by a rehab physician, a probable need for multiple therapy disciplines and potential to make ongoing and sustainable functional improvement that is of practical value. .  Equipment:    TBD pending progress with therapy. HISTORY:   History of Present Injury/Illness (Reason for Referral):  Per H&P: \"Pt is a 54 y/o smoker with DM, HTN, who presented to ER with L leg and arm weakness, L facial droop, dysarthria. First noted L leg weakness late night 12/11 when he woke up to go to the bathroom. He was normal when he went to bed around 1030. Woke up this morning and had persistent weakness L leg and also now noted in L arm. EMS called. Noted to have slurred speech and L facial droop as well. Code S called in ER around 9am.  MRI with acute infarcts in L cerebellar hemisphere, deep frontoparietal white matter, and R paramedian yariel. Also noted old lacunar infarcts. No large vessel occlusion on CTA, but some stenosis noted. No hx afib, TIA, CVA. No CP, palpitations, SOB. \"  Past Medical History/Comorbidities:   Mr. Sneha Munguia  has a past medical history of Acute ischemic stroke ([address] (12/12/2019), Acute pancreatitis (11/19/2014), Cerebrovascular accident (CVA) due to embolism ([address] (12/12/2019), Diabetes ([address] (2002), Diabetes ([address] Diabetes mellitus, and Hypertension. He also has no past medical history of Arthritis, Asthma, Autoimmune disease ([address] CAD (coronary artery disease), Cancer ([address] Chronic kidney disease, COPD, Dementia, Dementia ([address] Heart failure ([address] Ill-defined condition, Infectious disease, Liver disease, Other ill-defined conditions(799.89), Psychiatric disorder, PUD (peptic ulcer disease), Seizures ([address] or Sleep disorder.   Mr. Sneha Munguia has a past surgical history that includes hx hernia repair and hx orthopaedic. Social History/Living Environment:   Home Environment: Apartment  # Steps to Enter: 12  Rails to Enter: Yes  [employer] : Bilateral  One/Two Story Residence: One story  Living Alone: No  Support Systems: Spouse/Significant Other/Partner  Patient Expects to be Discharged to[de-identified] Unknown  Current DME Used/Available at Home: None  Tub or Shower Type: Tub/Shower combination  Prior Level of Function/Work/Activity:  Independent, lives with wife in 2nd story 1 level apartment. No recent falls. Number of Personal Factors/Comorbidities that affect the Plan of Care: 1-2: MODERATE COMPLEXITY   EXAMINATION:   Most Recent Physical Functioning:   Gross Assessment: left hip grossly 2/5 to 3-/5                       Balance:  Sitting: Impaired  Sitting - Static: Good (unsupported)  Sitting - Dynamic: Fair (occasional)  Standing: Impaired  Standing - Static: Good  Standing - Dynamic : Fair Bed Mobility:  Supine to Sit: Stand-by assistance  Scooting: Minimum assistance  Wheelchair Mobility: NT     Transfers:  Sit to Stand: Contact guard assistance;Stand-by assistance  Stand to Sit: Contact guard assistance;Stand-by assistance  Gait:     Base of Support: Narrowed  Speed/Clotilde: Slow  Gait Abnormalities: Hemiplegic  Distance (ft): 400 Feet (ft)  Assistive Device: Walker luke  Ambulation - Level of Assistance: Contact guard assistance;Stand-by assistance  Interventions: Safety awareness training; Tactile cues; Verbal cues      Body Structures Involved:  1. Nerves  2. Voice/Speech  3. Bones  4. Joints  5. Muscles Body Functions Affected:  1. Mental  2. Sensory/Pain  3. Neuromusculoskeletal  4. Movement Related Activities and Participation Affected:  1. General Tasks and Demands  2. Mobility  3. Self Care  4.  Interpersonal Interactions and Relationships   Number of elements that affect the Plan of Care: 4+: HIGH COMPLEXITY   CLINICAL PRESENTATION: Presentation: Evolving clinical presentation with changing clinical characteristics: MODERATE COMPLEXITY   CLINICAL DECISION MAKIN [facility] Mobility Inpatient Short Form  How much difficulty does the patient currently have. .. Unable A Lot A Little None   1. Turning over in bed (including adjusting bedclothes, sheets and blankets)? [] 1   [] 2   [] 3   [x] 4   2. Sitting down on and standing up from a chair with arms ( e.g., wheelchair, bedside commode, etc.)   [] 1   [] 2   [x] 3   [] 4   3. Moving from lying on back to sitting on the side of the bed? [] 1   [] 2   [] 3   [x] 4   How much help from another person does the patient currently need. .. Total A Lot A Little None   4. Moving to and from a bed to a chair (including a wheelchair)? [] 1   [] 2   [x] 3   [] 4   5. Need to walk in hospital room? [] 1   [] 2   [x] 3   [] 4   6. Climbing 3-5 steps with a railing? [] 1   [x] 2   [] 3   [] 4   © 2007, Trustees of [address] [place] [address], under license to [employer]. All rights reserved      Score:  Initial: 13 Most Recent: 19 (Date: 2020)    Interpretation of Tool:  Represents activities that are increasingly more difficult (i.e. Bed mobility, Transfers, Gait). Medical Necessity:     · Patient is expected to demonstrate progress in   · strength, range of motion, balance, coordination, and functional technique  ·  to   · increase independence with all mobility. · .  Reason for Services/Other Comments:  · Patient continues to require skilled intervention due to   · medical complications and mobility deficits which impact his level of function, safety, and independence as indicated above.    · .   Use of outcome tool(s) and clinical judgement create a POC that gives a: Questionable prediction of patient's progress: MODERATE COMPLEXITY        TREATMENT:      Pre-treatment Symptoms/Complaints: see above  Pain: Initial: 0/10 Post Session:  0/10 Therapeutic Activity: (    25 Minutes): Therapeutic activities including bed mobility training, transfer training from various surface heights, ambulation on level ground, static/dynamic sitting/standing balance training, instruction in sequencing with luke walker, scooting, posture training, donning/doffing sling, and patient education to improve mobility, strength and balance. Required moderate Safety awareness training; Tactile cues; Verbal cues to promote static and dynamic balance in standing and promote coordination of bilateral, lower extremity(s). Braces/Orthotics/Lines/Etc:   · Sling to LUE  Treatment/Session Assessment:    · Response to Treatment:  See above  · Interdisciplinary Collaboration:   o Physical Therapy Assistant  o Registered Nurse  · After treatment position/precautions:   o Supine in bed  o Bed/Chair-wheels locked  o Bed in low position  o Call light within reach  o RN notified   · Compliance with Program/Exercises: Compliant all of the time  · Recommendations/Intent for next treatment session: \"Next visit will focus on advancements to more challenging activities and reduction in assistance provided\".     Total Treatment Duration:  PT Patient Time In/Time Out  Time In: 1115  Time Out: [address] Haritha, TODD

## 2020-05-02 NOTE — PROGRESS NOTES
Problem: Patient Education: Go to Patient Education Activity  Goal: Patient/Family Education  Outcome: Progressing Towards Goal     Problem: Pressure Injury - Risk of  Goal: *Prevention of pressure injury  Description: Document Dewey Scale and appropriate interventions in the flowsheet. Outcome: Progressing Towards Goal  Note: Pressure Injury Interventions:  Sensory Interventions: Assess changes in LOC    Moisture Interventions: Absorbent underpads    Activity Interventions: Pressure redistribution bed/mattress(bed type)    Mobility Interventions: Pressure redistribution bed/mattress (bed type)    Nutrition Interventions: Offer support with meals,snacks and hydration    Friction and Shear Interventions: HOB 30 degrees or less                Problem: Patient Education: Go to Patient Education Activity  Goal: Patient/Family Education  Outcome: Progressing Towards Goal     Problem: Falls - Risk of  Goal: *Absence of Falls  Description: Document Jeanne Fall Risk and appropriate interventions in the flowsheet. Outcome: Progressing Towards Goal  Note: Fall Risk Interventions:  Mobility Interventions: Bed/chair exit alarm    Mentation Interventions: Bed/chair exit alarm, Door open when patient unattended    Medication Interventions: Bed/chair exit alarm    Elimination Interventions: Call light in reach    History of Falls Interventions: Bed/chair exit alarm         Problem: Patient Education: Go to Patient Education Activity  Goal: Patient/Family Education  Outcome: Progressing Towards Goal     Problem: Diabetes Self-Management  Goal: *Disease process and treatment process  Description: Define diabetes and identify own type of diabetes; list 3 options for treating diabetes. Outcome: Progressing Towards Goal  Goal: *Incorporating nutritional management into lifestyle  Description: Describe effect of type, amount and timing of food on blood glucose; list 3 methods for planning meals.   Outcome: Progressing Towards Goal  Goal: *Incorporating physical activity into lifestyle  Description: State effect of exercise on blood glucose levels. Outcome: Progressing Towards Goal  Goal: *Developing strategies to promote health/change behavior  Description: Define the ABC's of diabetes; identify appropriate screenings, schedule and personal plan for screenings. Outcome: Progressing Towards Goal  Goal: *Using medications safely  Description: State effect of diabetes medications on diabetes; name diabetes medication taking, action and side effects. Outcome: Progressing Towards Goal  Goal: *Monitoring blood glucose, interpreting and using results  Description: Identify recommended blood glucose targets  and personal targets. Outcome: Progressing Towards Goal  Goal: *Prevention, detection, treatment of acute complications  Description: List symptoms of hyper- and hypoglycemia; describe how to treat low blood sugar and actions for lowering  high blood glucose level. Outcome: Progressing Towards Goal  Goal: *Prevention, detection and treatment of chronic complications  Description: Define the natural course of diabetes and describe the relationship of blood glucose levels to long term complications of diabetes.   Outcome: Progressing Towards Goal  Goal: *Developing strategies to address psychosocial issues  Description: Describe feelings about living with diabetes; identify support needed and support network  Outcome: Progressing Towards Goal     Problem: Patient Education: Go to Patient Education Activity  Goal: Patient/Family Education  Outcome: Progressing Towards Goal     Problem: Patient Education: Go to Patient Education Activity  Goal: Patient/Family Education  Outcome: Progressing Towards Goal     Problem: Nutrition Deficit  Goal: *Optimize nutritional status  Outcome: Progressing Towards Goal     Problem: Patient Education: Go to Patient Education Activity  Goal: Patient/Family Education  Outcome: Progressing Towards Goal Problem: General Medical Care Plan  Goal: *Vital signs within specified parameters  Outcome: Progressing Towards Goal  Goal: *Labs within defined limits  Outcome: Progressing Towards Goal  Goal: *Absence of infection signs and symptoms  Description: Wash hand more often   Outcome: Progressing Towards Goal  Goal: *Optimal pain control at patient's stated goal  Outcome: Progressing Towards Goal  Goal: *Skin integrity maintained  Outcome: Progressing Towards Goal  Goal: *Fluid volume balance  Outcome: Progressing Towards Goal  Goal: *Optimize nutritional status  Outcome: Progressing Towards Goal  Goal: *Anxiety reduced or absent  Outcome: Progressing Towards Goal  Goal: *Progressive mobility and function (eg: ADL's)  Outcome: Progressing Towards Goal     Problem: Patient Education: Go to Patient Education Activity  Goal: Patient/Family Education  Outcome: Progressing Towards Goal     Problem: Patient Education: Go to Patient Education Activity  Goal: Patient/Family Education  Outcome: Progressing Towards Goal     Problem: Patient Education: Go to Patient Education Activity  Goal: Patient/Family Education  Outcome: Progressing Towards Goal     Problem: Patient Education: Go to Patient Education Activity  Goal: Patient/Family Education  Outcome: Progressing Towards Goal     Problem: Ischemic Stroke: Discharge Outcomes  Goal: *Verbalizes anxiety and depression are reduced or absent  Outcome: Progressing Towards Goal  Goal: *Verbalize understanding of risk factor modification(Stroke Metric)  Outcome: Progressing Towards Goal  Goal: *Hemodynamically stable  Outcome: Progressing Towards Goal  Goal: *Absence of aspiration pneumonia  Outcome: Progressing Towards Goal  Goal: *Aware of needed dietary changes  Outcome: Progressing Towards Goal  Goal: *Verbalize understanding of prescribed medications including anti-coagulants, anti-lipid, and/or anti-platelets(Stroke Metric)  Outcome: Progressing Towards Goal  Goal: *Tolerating diet  Outcome: Progressing Towards Goal  Goal: *Aware of follow-up diagnostics related to anticoagulants  Outcome: Progressing Towards Goal  Goal: *Ability to perform ADLs and demonstrates progressive mobility and function  Outcome: Progressing Towards Goal  Goal: *Absence of DVT(Stroke Metric)  Outcome: Progressing Towards Goal  Goal: *Absence of aspiration  Outcome: Progressing Towards Goal  Goal: *Optimal pain control at patient's stated goal  Outcome: Progressing Towards Goal  Goal: *Home safety concerns addressed  Outcome: Progressing Towards Goal  Goal: *Describes available resources and support systems  Outcome: Progressing Towards Goal  Goal: *Verbalizes understanding of activation of EMS(911) for stroke symptoms(Stroke Metric)  Outcome: Progressing Towards Goal  Goal: *Understands and describes signs and symptoms to report to providers(Stroke Metric)  Outcome: Progressing Towards Goal  Goal: *Neurolgocially stable (absence of additional neurological deficits)  Outcome: Progressing Towards Goal  Goal: *Verbalizes importance of follow-up with primary care physician(Stroke Metric)  Outcome: Progressing Towards Goal  Goal: *Smoking cessation discussed,if applicable(Stroke Metric)  Outcome: Progressing Towards Goal  Goal: *Depression screening completed(Stroke Metric)  Outcome: Progressing Towards Goal     Problem: Pain  Goal: *Control of Pain  Outcome: Progressing Towards Goal     Problem: Patient Education: Go to Patient Education Activity  Goal: Patient/Family Education  Outcome: Progressing Towards Goal     Problem: Patient Education: Go to Patient Education Activity  Goal: Patient/Family Education  Outcome: Progressing Towards Goal

## 2020-05-03 PROCEDURE — 74011250637 HC RX REV CODE- 250/637: Performed by: NURSE PRACTITIONER

## 2020-05-03 PROCEDURE — 74011250637 HC RX REV CODE- 250/637: Performed by: HOSPITALIST

## 2020-05-03 PROCEDURE — 74011250636 HC RX REV CODE- 250/636: Performed by: INTERNAL MEDICINE

## 2020-05-03 PROCEDURE — 65270000029 HC RM PRIVATE

## 2020-05-03 PROCEDURE — 74011250637 HC RX REV CODE- 250/637: Performed by: INTERNAL MEDICINE

## 2020-05-03 RX ADMIN — Medication 400 MG: at 08:49

## 2020-05-03 RX ADMIN — ACETAMINOPHEN 650 MG: 325 TABLET, FILM COATED ORAL at 16:16

## 2020-05-03 RX ADMIN — DIPHENHYDRAMINE HYDROCHLORIDE 25 MG: 25 CAPSULE ORAL at 21:47

## 2020-05-03 RX ADMIN — METFORMIN HYDROCHLORIDE 500 MG: 500 TABLET ORAL at 08:48

## 2020-05-03 RX ADMIN — LISINOPRIL 40 MG: 20 TABLET ORAL at 08:48

## 2020-05-03 RX ADMIN — METFORMIN HYDROCHLORIDE 500 MG: 500 TABLET ORAL at 16:16

## 2020-05-03 RX ADMIN — ASPIRIN 81 MG 81 MG: 81 TABLET ORAL at 08:48

## 2020-05-03 RX ADMIN — ATORVASTATIN CALCIUM 80 MG: 80 TABLET, FILM COATED ORAL at 21:47

## 2020-05-03 RX ADMIN — ACETAMINOPHEN 650 MG: 325 TABLET, FILM COATED ORAL at 07:24

## 2020-05-03 RX ADMIN — GUAIFENESIN 100 MG: 100 SOLUTION ORAL at 21:47

## 2020-05-03 RX ADMIN — GUAIFENESIN 100 MG: 100 SOLUTION ORAL at 08:48

## 2020-05-03 RX ADMIN — DIPHENHYDRAMINE HYDROCHLORIDE 25 MG: 25 CAPSULE ORAL at 08:48

## 2020-05-03 RX ADMIN — Medication 400 MG: at 17:06

## 2020-05-03 RX ADMIN — ACETAMINOPHEN 650 MG: 325 TABLET, FILM COATED ORAL at 08:48

## 2020-05-03 RX ADMIN — ENOXAPARIN SODIUM 40 MG: 40 INJECTION SUBCUTANEOUS at 14:59

## 2020-05-03 RX ADMIN — ACETAMINOPHEN 650 MG: 325 TABLET, FILM COATED ORAL at 21:47

## 2020-05-03 RX ADMIN — METOPROLOL SUCCINATE 25 MG: 25 TABLET, FILM COATED, EXTENDED RELEASE ORAL at 08:48

## 2020-05-04 LAB
ALBUMIN SERPL-MCNC: 2.7 G/DL (ref 3.5–5)
ALBUMIN/GLOB SERPL: 0.7 {RATIO} (ref 1.2–3.5)
ALP SERPL-CCNC: 68 U/L (ref 50–136)
ALT SERPL-CCNC: 18 U/L (ref 12–65)
ANION GAP SERPL CALC-SCNC: 8 MMOL/L (ref 7–16)
AST SERPL-CCNC: 16 U/L (ref 15–37)
BASOPHILS # BLD: 0 K/UL (ref 0–0.2)
BASOPHILS NFR BLD: 0 % (ref 0–2)
BILIRUB SERPL-MCNC: 0.4 MG/DL (ref 0.2–1.1)
BUN SERPL-MCNC: 18 MG/DL (ref 6–23)
CALCIUM SERPL-MCNC: 9.5 MG/DL (ref 8.3–10.4)
CHLORIDE SERPL-SCNC: 108 MMOL/L (ref 98–107)
CO2 SERPL-SCNC: 25 MMOL/L (ref 21–32)
CREAT SERPL-MCNC: 0.96 MG/DL (ref 0.8–1.5)
DIFFERENTIAL METHOD BLD: ABNORMAL
EOSINOPHIL # BLD: 0.1 K/UL (ref 0–0.8)
EOSINOPHIL NFR BLD: 1 % (ref 0.5–7.8)
ERYTHROCYTE [DISTWIDTH] IN BLOOD BY AUTOMATED COUNT: 16.2 % (ref 11.9–14.6)
GLOBULIN SER CALC-MCNC: 3.8 G/DL (ref 2.3–3.5)
GLUCOSE SERPL-MCNC: 95 MG/DL (ref 65–100)
HCT VFR BLD AUTO: 34.2 % (ref 41.1–50.3)
HGB BLD-MCNC: 11 G/DL (ref 13.6–17.2)
IMM GRANULOCYTES # BLD AUTO: 0 K/UL (ref 0–0.5)
IMM GRANULOCYTES NFR BLD AUTO: 0 % (ref 0–5)
LYMPHOCYTES # BLD: 2.5 K/UL (ref 0.5–4.6)
LYMPHOCYTES NFR BLD: 43 % (ref 13–44)
MCH RBC QN AUTO: 27.1 PG (ref 26.1–32.9)
MCHC RBC AUTO-ENTMCNC: 32.2 G/DL (ref 31.4–35)
MCV RBC AUTO: 84.2 FL (ref 79.6–97.8)
MONOCYTES # BLD: 0.3 K/UL (ref 0.1–1.3)
MONOCYTES NFR BLD: 6 % (ref 4–12)
NEUTS SEG # BLD: 2.9 K/UL (ref 1.7–8.2)
NEUTS SEG NFR BLD: 50 % (ref 43–78)
NRBC # BLD: 0 K/UL (ref 0–0.2)
PLATELET # BLD AUTO: 187 K/UL (ref 150–450)
PMV BLD AUTO: 11.9 FL (ref 9.4–12.3)
POTASSIUM SERPL-SCNC: 4.2 MMOL/L (ref 3.5–5.1)
PROT SERPL-MCNC: 6.5 G/DL (ref 6.3–8.2)
RBC # BLD AUTO: 4.06 M/UL (ref 4.23–5.6)
SODIUM SERPL-SCNC: 141 MMOL/L (ref 136–145)
WBC # BLD AUTO: 5.9 K/UL (ref 4.3–11.1)

## 2020-05-04 PROCEDURE — 74011250637 HC RX REV CODE- 250/637: Performed by: NURSE PRACTITIONER

## 2020-05-04 PROCEDURE — 85025 COMPLETE CBC W/AUTO DIFF WBC: CPT

## 2020-05-04 PROCEDURE — 74011250637 HC RX REV CODE- 250/637: Performed by: INTERNAL MEDICINE

## 2020-05-04 PROCEDURE — 74011250637 HC RX REV CODE- 250/637: Performed by: HOSPITALIST

## 2020-05-04 PROCEDURE — 97535 SELF CARE MNGMENT TRAINING: CPT

## 2020-05-04 PROCEDURE — 97530 THERAPEUTIC ACTIVITIES: CPT

## 2020-05-04 PROCEDURE — 36415 COLL VENOUS BLD VENIPUNCTURE: CPT

## 2020-05-04 PROCEDURE — 65270000029 HC RM PRIVATE

## 2020-05-04 PROCEDURE — 74011250636 HC RX REV CODE- 250/636: Performed by: INTERNAL MEDICINE

## 2020-05-04 PROCEDURE — 80053 COMPREHEN METABOLIC PANEL: CPT

## 2020-05-04 RX ADMIN — GUAIFENESIN 100 MG: 100 SOLUTION ORAL at 14:36

## 2020-05-04 RX ADMIN — DIPHENHYDRAMINE HYDROCHLORIDE 25 MG: 25 CAPSULE ORAL at 06:12

## 2020-05-04 RX ADMIN — ACETAMINOPHEN 650 MG: 325 TABLET, FILM COATED ORAL at 08:37

## 2020-05-04 RX ADMIN — LISINOPRIL 40 MG: 20 TABLET ORAL at 08:37

## 2020-05-04 RX ADMIN — Medication 400 MG: at 17:05

## 2020-05-04 RX ADMIN — ACETAMINOPHEN 650 MG: 325 TABLET, FILM COATED ORAL at 06:12

## 2020-05-04 RX ADMIN — GUAIFENESIN 100 MG: 100 SOLUTION ORAL at 21:43

## 2020-05-04 RX ADMIN — ASPIRIN 81 MG 81 MG: 81 TABLET ORAL at 08:36

## 2020-05-04 RX ADMIN — ACETAMINOPHEN 650 MG: 325 TABLET, FILM COATED ORAL at 21:43

## 2020-05-04 RX ADMIN — METOPROLOL SUCCINATE 25 MG: 25 TABLET, FILM COATED, EXTENDED RELEASE ORAL at 08:36

## 2020-05-04 RX ADMIN — METFORMIN HYDROCHLORIDE 500 MG: 500 TABLET ORAL at 08:37

## 2020-05-04 RX ADMIN — GUAIFENESIN 100 MG: 100 SOLUTION ORAL at 06:12

## 2020-05-04 RX ADMIN — DIPHENHYDRAMINE HYDROCHLORIDE 25 MG: 25 CAPSULE ORAL at 21:43

## 2020-05-04 RX ADMIN — METFORMIN HYDROCHLORIDE 500 MG: 500 TABLET ORAL at 17:05

## 2020-05-04 RX ADMIN — ATORVASTATIN CALCIUM 80 MG: 80 TABLET, FILM COATED ORAL at 21:43

## 2020-05-04 RX ADMIN — Medication 400 MG: at 08:37

## 2020-05-04 RX ADMIN — ENOXAPARIN SODIUM 40 MG: 40 INJECTION SUBCUTANEOUS at 14:37

## 2020-05-04 RX ADMIN — ACETAMINOPHEN 650 MG: 325 TABLET, FILM COATED ORAL at 14:36

## 2020-05-04 NOTE — PROGRESS NOTES
Problem: Mobility Impaired (Adult and Pediatric)  Goal: *Acute Goals and Plan of Care  Description  GOALS UPDATED ON RE-ASSESSMENT 4/27/2020 (BOLD INDICATES UPDATED GOAL):  1. Patient will perform bed mobility with MODIFIED INDEPENDENCE within 7 treatment days. 2. Patient will perform transfer bed to chair with MODIFIED INDEPENDENCE within 7 treatment days. 3. Patient will demonstrate fair+ dynamic balance throughout stance phase on L LE within 7 treatment days. 4. Patient will require STAND BY ASSIST throughout swing phase on (to advance) L LE within 7 treatment days. 5. Patient demonstrate 3+/5 strength in L LE hip flexion, hip abduction, and hip adduction within 7 treatment days. 6. Patient will ambulate 200ft+ with STAND BY ASSIST and least retrictive assistive device within 7 treatment days. 7. Patient will perform wheelchair mobility 150 ft with modified independence within 7 treatment days. 8. Mr. Juvencio Saavedra will don/doff LUE sling for protection of L shoulder during transfers/gait with set up within 7 treatment days. 9. Pt's spouse / caregiver will demonstrate SBA guarding during transfers and household level (150ft) ambulation within 7 treatment days. PHYSICAL THERAPY: Daily Note and AM 5/4/2020  INPATIENT: PT Visit Days : 6  [id number] [address] / Plan: [address] [place] [address] Avenue / Product Type: Medicaid /       NAME/AGE/GENDER: Allie Winters is a 53 y.o. male   PRIMARY DIAGNOSIS: Acute ischemic stroke ([address] [I63.9]  Cerebrovascular accident (CVA) due to embolism ([address] [J92.4] Acute embolic stroke ([address] Acute embolic stroke ([address]       ICD-10: Treatment Diagnosis:   · Difficulty in walking, Not elsewhere classified (R26.2)  · Hemiplegia and hemiparesis following cerebral infarction affecting left non-dominant side (J61.809)   Precaution/Allergies:  Patient has no known allergies. ASSESSMENT:     Mr. Henriquez is a 53year old admitted R MCA CVA.  Patient was supine upon contact, flat affect, and agreeable to PT. Awaiting AFO for L LE. Tennis shoes and high socks placed in room for use with AFO.   5/4/2020- Patient performed supine to sit with SBA, additional time and cues for technique. Donned L UE sling for support with ambulation with moderate assistance. Patient performed transfers with CGA and ambulation with hemiwalker, gait belt and CGA. Demonstrated a hemipalegic gait pattern on L LE with circumduction and fair dynamic balance. Improved L quad activation today with less hyperextension L LE appreciated as well and mild improvement in heel strike and stance phase. After seated rest break, addressed wheelchair mobility x50ft with CGA and cues for technique. Goals are progressing an ongoing. Plan for formal re-assessment next treatment session as needed or with AFO arrival.     This section established at most recent assessment   PROBLEM LIST (Impairments causing functional limitations):  1. Decreased Strength  2. Decreased ADL/Functional Activities  3. Decreased Transfer Abilities  4. Decreased Ambulation Ability/Technique  5. Decreased Balance  6. Increased Pain  7. Decreased Activity Tolerance  8. Increased Fatigue  9. Decreased Flexibility/Joint Mobility   INTERVENTIONS PLANNED: (Benefits and precautions of physical therapy have been discussed with the patient.)  1. Balance Exercise  2. Bed Mobility  3. Family Education  4. Gait Training  5. Home Exercise Program (HEP)  6. Manual Therapy  7. Neuromuscular Re-education/Strengthening  8. Range of Motion (ROM)  9. Therapeutic Activites  10. Therapeutic Exercise/Strengthening  11. Transfer Training     TREATMENT PLAN: Frequency/Duration: 5 times a week for duration of hospital stay  Rehabilitation Potential For Stated Goals: Good     REHAB RECOMMENDATIONS (at time of discharge pending progress):    Placement:   It is my opinion, based on this patient's performance to date, that Mr. Jackie Anderson may benefit from intensive therapy at an [facility] after discharge due to a probable need for close medical supervision by a rehab physician, a probable need for multiple therapy disciplines and potential to make ongoing and sustainable functional improvement that is of practical value. .  Equipment:    TBD pending progress with therapy. HISTORY:   History of Present Injury/Illness (Reason for Referral):  Per H&P: \"Pt is a 52 y/o smoker with DM, HTN, who presented to ER with L leg and arm weakness, L facial droop, dysarthria. First noted L leg weakness late night 12/11 when he woke up to go to the bathroom. He was normal when he went to bed around 1030. Woke up this morning and had persistent weakness L leg and also now noted in L arm. EMS called. Noted to have slurred speech and L facial droop as well. Code S called in ER around 9am.  MRI with acute infarcts in L cerebellar hemisphere, deep frontoparietal white matter, and R paramedian yariel. Also noted old lacunar infarcts. No large vessel occlusion on CTA, but some stenosis noted. No hx afib, TIA, CVA. No CP, palpitations, SOB. \"  Past Medical History/Comorbidities:   Mr. Ana Olson  has a past medical history of Acute ischemic stroke ([address] (12/12/2019), Acute pancreatitis (11/19/2014), Cerebrovascular accident (CVA) due to embolism ([address] (12/12/2019), Diabetes ([address] (2002), Diabetes ([address] Diabetes mellitus, and Hypertension. He also has no past medical history of Arthritis, Asthma, Autoimmune disease ([address] CAD (coronary artery disease), Cancer ([address] Chronic kidney disease, COPD, Dementia, Dementia ([address] Heart failure ([address] Ill-defined condition, Infectious disease, Liver disease, Other ill-defined conditions(799.89), Psychiatric disorder, PUD (peptic ulcer disease), Seizures ([address] or Sleep disorder. Mr. Ana Olson  has a past surgical history that includes hx hernia repair and hx orthopaedic.   Social History/Living Environment:   Home Environment: Apartment  # Steps to Enter: 12  Rails to Enter: Yes  Hand Rails : Bilateral  One/Two Story Residence: One story  Living Alone: No  Support Systems: Spouse/Significant Other/Partner  Patient Expects to be Discharged to[de-identified] Unknown  Current DME Used/Available at Home: None  Tub or Shower Type: Tub/Shower combination  Prior Level of Function/Work/Activity:  Independent, lives with wife in 2nd story 1 level apartment. No recent falls. Number of Personal Factors/Comorbidities that affect the Plan of Care: 1-2: MODERATE COMPLEXITY   EXAMINATION:   Most Recent Physical Functioning:   Gross Assessment: left hip grossly 2/5 to 3-/5  AROM: Generally decreased, functional  PROM: Within functional limits  Strength: Generally decreased, functional  Coordination: Generally decreased, functional  Tone: Abnormal(L UE; L LE)  Sensation: Intact                    Balance:  Sitting: Impaired  Sitting - Static: Good (unsupported)  Sitting - Dynamic: Fair (occasional)  Standing: Impaired  Standing - Static: Good  Standing - Dynamic : Fair Bed Mobility:  Supine to Sit: Stand-by assistance; Additional time  Scooting: Contact guard assistance  Wheelchair Mobility: NT     Transfers:  Sit to Stand: Contact guard assistance;Stand-by assistance  Stand to Sit: Stand-by assistance;Contact guard assistance  Interventions: Safety awareness training; Tactile cues; Verbal cues  Gait:     Base of Support: Narrowed; Center of gravity altered  Gait Abnormalities: Circumduction;Trunk sway increased; Hemiplegic  Distance (ft): 400 Feet (ft)  Assistive Device: Walker luke;Gait belt  Ambulation - Level of Assistance: Contact guard assistance      Body Structures Involved:  1. Nerves  2. Voice/Speech  3. Bones  4. Joints  5. Muscles Body Functions Affected:  1. Mental  2. Sensory/Pain  3. Neuromusculoskeletal  4. Movement Related Activities and Participation Affected:  1. General Tasks and Demands  2. Mobility  3. Self Care  4.  Interpersonal Interactions and Relationships   Number of elements that affect the Plan of Care: 4+: HIGH COMPLEXITY   CLINICAL PRESENTATION:   Presentation: Evolving clinical presentation with changing clinical characteristics: MODERATE COMPLEXITY   CLINICAL DECISION MAKIN [facility] Inpatient Short Form  How much difficulty does the patient currently have. .. Unable A Lot A Little None   1. Turning over in bed (including adjusting bedclothes, sheets and blankets)? [] 1   [] 2   [] 3   [x] 4   2. Sitting down on and standing up from a chair with arms ( e.g., wheelchair, bedside commode, etc.)   [] 1   [] 2   [x] 3   [] 4   3. Moving from lying on back to sitting on the side of the bed? [] 1   [] 2   [] 3   [x] 4   How much help from another person does the patient currently need. .. Total A Lot A Little None   4. Moving to and from a bed to a chair (including a wheelchair)? [] 1   [] 2   [x] 3   [] 4   5. Need to walk in hospital room? [] 1   [] 2   [x] 3   [] 4   6. Climbing 3-5 steps with a railing? [] 1   [x] 2   [] 3   [] 4   © , Trustees of [address] [place] [address], under license to [employer]. All rights reserved      Score:  Initial: 13 Most Recent: 19 (Date: 2020)    Interpretation of Tool:  Represents activities that are increasingly more difficult (i.e. Bed mobility, Transfers, Gait). Medical Necessity:     · Patient is expected to demonstrate progress in   · strength, range of motion, balance, coordination, and functional technique  ·  to   · increase independence with all mobility. · .  Reason for Services/Other Comments:  · Patient continues to require skilled intervention due to   · medical complications and mobility deficits which impact his level of function, safety, and independence as indicated above.    · .   Use of outcome tool(s) and clinical judgement create a POC that gives a: Questionable prediction of patient's progress: MODERATE COMPLEXITY TREATMENT:      Pre-treatment Symptoms/Complaints: see above  Pain: Initial: 0/10 Post Session:  0/10       Therapeutic Activity: (    25 Minutes): Therapeutic activities including bed mobility training, transfer training from various surface heights, ambulation on level ground, static/dynamic sitting/standing balance training, instruction in sequencing with luke walker, scooting, posture training, donning/doffing sling, and patient education to improve mobility, strength and balance. Required moderate   to promote static and dynamic balance in standing and promote coordination of bilateral, lower extremity(s). Braces/Orthotics/Lines/Etc:   · Sling to LUE  Treatment/Session Assessment:    · Response to Treatment:  See above  · Interdisciplinary Collaboration:   o Physical Therapist  o Registered Nurse  o [id number] [facility] chair follow  · After treatment position/precautions:   o Supine in bed  o Bed/Chair-wheels locked  o Bed in low position  o Call light within reach  o RN notified  o Side rails x 3   · Compliance with Program/Exercises: Compliant all of the time  · Recommendations/Intent for next treatment session: \"Next visit will focus on advancements to more challenging activities and reduction in assistance provided\".     Total Treatment Duration:  PT Patient Time In/Time Out  Time In: 1310  Time Out: 1030 [facility]

## 2020-05-04 NOTE — PROGRESS NOTES
Hospitalist Progress Note    Subjective:   Daily Progress Note: 5/3/2020 0557    Patient admitted 12/12/19 with acute right side embolic stroke with left side residual weakness and left facial droop. CT brain on admission with indeterminate infarctions within the left corona radiata/caudate.  CTA of the head and neck with stenosis at the distal segment of the right  Vertebral artery and multifocal stenosis in the P2 segment of the left posterior cerebral artery.  MRI brain with acute to early subacute infarcts in the left cerebellar hemisphere in the periventricular deep left frontoparietal white matter and likely additional areas of restricted diffusion in the right paramedian yariel. Echo with + PFO, on statin and ASA.  Will need event monitor on discharge and if no arrhythmia, PFO closure recommended.  Wife informs CM she does not want patient at home, they were planning to separate prior to this stroke.  CM attempting  to place in STR, medicaid pending.    4/9:  Speech remains slightly slurred, SLP signing off as clinical indication no longer needed.    4/10: Mag critically low today: 1.3. Replaced   4/14:  Continues to wait for placement.  Good participation with PT/OT. Rennis Meter a little use of right hand, still unable to .    5/2:  Fitted for AFO today per orthotist.  No complaints. Continued wait for medicaid and SSI approval.  Metformin dosing adjusted a few days ago. Glucose 118-126 x 24 hours. CM spoke with wife, still does not want patient to come home, even if he has to go to a shelter.       5/3:  In good spirits, more conversant.   Up in room with assist.     ADDITIONAL HISTORY:  DM II, Hypertension, pancreatitis    Current Facility-Administered Medications   Medication Dose Route Frequency    magnesium oxide (MAG-OX) tablet 400 mg  400 mg Oral BID    metFORMIN (GLUCOPHAGE) tablet 500 mg  500 mg Oral BID WITH MEALS    acetaminophen (TYLENOL) tablet 650 mg  650 mg Oral Q4H PRN    diphenhydrAMINE (BENADRYL) capsule 25 mg  25 mg Oral Q6H PRN    acetaminophen (TYLENOL) tablet 650 mg  650 mg Oral Q8H    lip protectant (BLISTEX) ointment 1 Each  1 Each Topical PRN    metoprolol succinate (TOPROL-XL) XL tablet 25 mg  25 mg Oral DAILY    polyethylene glycol (MIRALAX) packet 17 g  17 g Oral DAILY PRN    guaiFENesin (ROBITUSSIN) 100 mg/5 mL oral liquid 100 mg  100 mg Oral Q4H PRN    hydrALAZINE (APRESOLINE) 20 mg/mL injection 20 mg  20 mg IntraVENous Q4H PRN    atorvastatin (LIPITOR) tablet 80 mg  80 mg Oral QHS    lisinopril (PRINIVIL, ZESTRIL) tablet 40 mg  40 mg Oral DAILY    sodium chloride (NS) flush 5-40 mL  5-40 mL IntraVENous PRN    ondansetron (ZOFRAN) injection 4 mg  4 mg IntraVENous Q4H PRN    aspirin chewable tablet 81 mg  81 mg Oral DAILY    enoxaparin (LOVENOX) injection 40 mg  40 mg SubCUTAneous Q24H    dextrose 40% (GLUTOSE) oral gel 1 Tube  15 g Oral PRN    glucagon (GLUCAGEN) injection 1 mg  1 mg IntraMUSCular PRN    dextrose (D50W) injection syrg 12.5-25 g  25-50 mL IntraVENous PRN        Review of Systems  A comprehensive review of systems was negative except for that written in the HPI. Objective:     Visit Vitals  /89 (BP 1 Location: Right arm, BP Patient Position: At rest)   Pulse 73   Temp 98 °F (36.7 °C)   Resp 16   Ht 5' 6\" (1.676 m)   Wt 79.8 kg (175 lb 14.4 oz)   SpO2 94%   BMI 28.39 kg/m²      O2 Device: Room air    Temp (24hrs), Av.9 °F (36.6 °C), Min:97.5 °F (36.4 °C), Max:98.5 °F (36.9 °C)    701 -  1900  In: 720 [P.O.:720]  Out: -     General appearance: Oriented and alert, cooperative, denies need or new issues.  In good spirits.    Head: Normocephalic, without obvious abnormality, atraumatic  Eyes: conjunctivae/corneas clear.  PERRL  Throat: Continued mild left facial weakness.  Lips, mucosa, and tongue normal. Teeth and gums normal  Neck: supple, symmetrical, trachea midline, no JVD  Lungs: clear to auscultation bilaterally  Heart: regular rate and rhythm, S1, S2 normal, no murmur, click, rub or gallop  Abdomen: soft, non-tender. Bowel sounds normal. No masses,  no organomegaly  Extremities: Right lower and upper extremities normal, atraumatic, no cyanosis or edema.  Persistent left side residual weakness, both upper and lower extremities on left weak. Skin: Skin color, texture, turgor normal. No rashes or lesions  Neurologic: As above.     Additional comments: Notes,orders, test results, vitals reviewed    Data Review  LAST LABS:  4/21:      Ref. Range 4/21/2020 04:19   WBC Latest Ref Range: 4.3 - 11.1 K/uL 5.1   RBC Latest Ref Range: 4.23 - 5.6 M/uL 3.94 (L)   HGB Latest Ref Range: 13.6 - 17.2 g/dL 10.4 (L)   HCT Latest Ref Range: 41.1 - 50.3 % 33.2 (L)   MCV Latest Ref Range: 79.6 - 97.8 FL 84.3   MCH Latest Ref Range: 26.1 - 32.9 PG 26.4   MCHC Latest Ref Range: 31.4 - 35.0 g/dL 31.3 (L)   RDW Latest Ref Range: 11.9 - 14.6 % 16.4 (H)   PLATELET Latest Ref Range: 150 - 450 K/uL 170   MPV Latest Ref Range: 9.4 - 12.3 FL 12.0   NEUTROPHILS Latest Ref Range: 43 - 78 % 42 (L)   LYMPHOCYTES Latest Ref Range: 13 - 44 % 49 (H)   MONOCYTES Latest Ref Range: 4.0 - 12.0 % 7   EOSINOPHILS Latest Ref Range: 0.5 - 7.8 % 2   BASOPHILS Latest Ref Range: 0.0 - 2.0 % 1   IMMATURE GRANULOCYTES Latest Ref Range: 0.0 - 5.0 % 0   DF Latest Units:   AUTOMATED   ABSOLUTE NRBC Latest Ref Range: 0.0 - 0.2 K/uL 0.00   ABS. NEUTROPHILS Latest Ref Range: 1.7 - 8.2 K/UL 2.2   ABS. IMM. GRANS. Latest Ref Range: 0.0 - 0.5 K/UL 0.0   ABS. LYMPHOCYTES Latest Ref Range: 0.5 - 4.6 K/UL 2.5   ABS. MONOCYTES Latest Ref Range: 0.1 - 1.3 K/UL 0.3   ABS. EOSINOPHILS Latest Ref Range: 0.0 - 0.8 K/UL 0.1   ABS.  BASOPHILS Latest Ref Range: 0.0 - 0.2 K/UL 0.0   Sodium Latest Ref Range: 136 - 145 mmol/L 142   Potassium Latest Ref Range: 3.5 - 5.1 mmol/L 4.0   Chloride Latest Ref Range: 98 - 107 mmol/L 109 (H)   CO2 Latest Ref Range: 21 - 32 mmol/L 25   Anion gap Latest Ref Range: 7 - 16 mmol/L 8   Glucose Latest Ref Range: 65 - 100 mg/dL 87   BUN Latest Ref Range: 6 - 23 MG/DL 20   Creatinine Latest Ref Range: 0.8 - 1.5 MG/DL 1.01   Calcium Latest Ref Range: 8.3 - 10.4 MG/DL 9.3   Magnesium Latest Ref Range: 1.8 - 2.4 mg/dL 1.6 (L)   GFR est non-AA Latest Ref Range: >60 ml/min/1.73m2 >60   GFR est AA Latest Ref Range: >60 ml/min/1.73m2 >60   Bilirubin, total Latest Ref Range: 0.2 - 1.1 MG/DL 0.5   Protein, total Latest Ref Range: 6.3 - 8.2 g/dL 6.6   Albumin Latest Ref Range: 3.5 - 5.0 g/dL 2.7 (L)   Globulin Latest Ref Range: 2.3 - 3.5 g/dL 3.9 (H)   A-G Ratio Latest Ref Range: 1.2 - 3.5   0.7 (L)   ALT (SGPT) Latest Ref Range: 12 - 65 U/L 18   AST Latest Ref Range: 15 - 37 U/L 15   Alk. phosphatase Latest Ref Range: 50 - 136 U/L 67      12/12/19:  CT HEAD:  Age indeterminate infarctions within the left corona radiata/caudate. Mild chronic small vessel ischemic changes and areas of remote infarction as described.  Probable partial volume averaging the region of the left thalamus rather than intraparenchymal hemorrhage.     12/12/19:  CTA HEAD AND NECK:      Stenosis at the distal segment of the right vertebral artery and multifocal  stenosis in the P2 segment of the left posterior cerebral artery.     12/12/19:  MRI BRAIN:  Acute to early subacute infarcts in the left cerebellar hemisphere, in the periventricular deep left frontoparietal white matter and likely additional areas of restricted diffusion in the right paramedian yariel.  Old lacunar infarcts in the corpus striatum and periventricular white matter as well as within the left corona radiata.     1/8/20:  SWALLOW FUNCTION VIDEO: Small quantity aspiration with straw sips of thin liquids.     Assessment/Plan:   Acute to early subacute infarcts in the left cerebellar hemisphere, in the periventricular deep left frontoparietal white matter and likely additional areas of restricted diffusion in the right paramedian yariel.  Old lacunar infarcts also.                  Making good progress with PT/OT               Speech and swallowing improved to the extent         SLP  signed off              Pending placement after approved for SSI and          medicaid     Dysarthria due to stroke:  Improved immensely              SLP releasing 4/14 due to improved without need       for further intervention      Difficult placement with marital discord prior to stroke:  Wife does not want him at home:  CM working on tirelessly              Now issues obtaining MR for [employer] application               CM spoke with wife 5/1, she still does not want         patient to come home.      Hypertension:  Continue lisinopril, toprol XL     IDDM II:  Continue glucophage, diabetic diet. A1C: 8.4, rechecking               adding SSI 4/15              DM education and management continues to follow             intermittently, increased glucophage frequency 4/30     Arrhythmia:  On beta blocker     PFO 12/17/2019:                Will need 4 week event monitor on discharge               Will need PFO closure at some point after     discharge per Cards      Hypomagnesemia:  Replace and recheck               Increasing daily 400 mg dose to bid 5/2.               Recheck mag next week.       Plan discussed with RN, Patient      Signed By: Jazmín Smith NP     May 3, 2020

## 2020-05-04 NOTE — PROGRESS NOTES
Problem: Self Care Deficits Care Plan (Adult)  Goal: *Acute Goals and Plan of Care (Insert Text)  Description  Goals per Re-evaluation on 3/18/2020:   1. Patient will demonstrate appropriate safety awareness and protection of L UE during bed mobility and functional transfers with minimal cues. (Progressing, still needs cues, 4/14/20)  2. Patient will complete total body bathing and dressing with Min A and adaptive equipment as needed. (Progressing 4/14/20  3. Patient will complete weightbearing into the L UE with ADL tasks with minimal assistance to improve ability to use as a functional assist during ADL tasks. (Progressing 4/14/20)4. Patient will demonstrate L UE SROM HEP within 7 days. (Progressing, 3/18/2020)  5. Pt will complete bed mobility with Mod I and minimal verbal cueing (Progressing, Supervision, 4/14/20). 6. Pt will complete dynamic sitting balance for ADLs at mod I with good balance (Progressing, 4/14/20  7. Pt will complete functional transfers with Supervision with equipment as needed. (Progressing, SBA to CGA 4/14/20)  8. Pt will complete grooming tasks in standing at sink level x5 mins with good balance and equipment as needed MET  9. Pt will complete grooming standing at sink level with SBA and use of adaptive equipment as needed. MET  10. Pt will don/doff UE sling with SBA and use of minimal verbal and visual cueing for correct application (Progressing, Min A 4/14/20. Goals below remain appropriate as of 4/24/2020:  1. Patient will demonstrate appropriate safety awareness and protection of L UE during bed mobility and functional transfers with no verbal cues. CONTINUE   2. Patient will complete lower body bathing and dressing with Min A and adaptive equipment as needed. CONTINUE  3. Patient will complete upper body bathing and dressing with SBA and adaptive equipment as needed.  CONTINUE  4. Patient will complete weightbearing into the L UE with ADL tasks with minimal assistance to improve ability to use as a functional assist during ADL tasks. CONTINUE  5. Patient will demonstrate L UE SROM HEP within 7 days. CONTINUE  6. Pt will complete bed mobility with Mod I and no verbal cueing. CONTINUE  7. Pt will complete functional transfers with Supervision with equipment as needed. CONTINUE  8. Pt will don/doff UE sling with Mod I and no verbal cueing. CONTINUE  9. Pt will complete toileting with SBA for toilet transfer and gown management. CONTINUE  10. Patient will participate in using L UE as a functional assist for ADL for 15 minutes with minimal facilitation. CONTINUE  11. Patient will complete sit to stand at midline with minimal assistance and cueing. CONTINUE  12. Patient will complete therapeutic exercises with L UE with minimal tactile cues for facilitation of the LUE. CONTINUE    Outcome: Progressing Towards Goal     OCCUPATIONAL THERAPY: Daily Note and PM    5/4/2020  INPATIENT: OT Visit Days: 4  Payor: [id number] / Plan: [place] / Product Type: Medicaid /      NAME/AGE/GENDER: Hazel Waldron is a 53 y.o. male   PRIMARY DIAGNOSIS:  Acute ischemic stroke ([address] [I63.9]  Cerebrovascular accident (CVA) due to embolism ([address] [C10.8] Acute embolic stroke ([address] Acute embolic stroke ([address]       ICD-10: Treatment Diagnosis:    · Generalized Muscle Weakness (M62.81)  · Other lack of cordination (R27.8)  · Hemiplegia and hemiparesis following cerebral infarction affecting   · left non-dominant side (I69.354)  · Abnormal posture (R29.3)   Precautions/Allergies:    NO PULLING ON LUE   LUE in sling with shoulder joint approximated and supported, needs taping   Patient has no known allergies. ASSESSMENT:     Mr. Shanell Cueva presents to the hospital with L sided weakness and acute ischemic CVA. MRI revealed acute to subacute L cerebellar and R paramedian yariel infarcts. 5/4/2020: Pt supine in bed upon arrival.  Pt alert and agreeable to be seen by OT.  Pt reports no pain prior to or following functional mobility. Pt completes supine to sit with SBA from R side of bed as pt performs bed mobility from this side of bed with increased independence. Seated edge of bed pt with good static and fair dynamic seated balance. Pt with soiled brief and requires CGA/SBA with use of keanu walker to complete sit to stand. In standing, pt able to doff diaper brief to knees and requires Set Up/SBA to complete wiping. Pt requires SBA to return to seated edge of bed and doffs remainder of brief with use of R LE to remove. Pt requires Min A to don clean pull up with assist needed for placement of bilateral LE in both leg holes. Pt required CGA/SBA with use of keanu walker for sit to stand and SBA to don pull up above knees and buttocks. Pt requires CGA with use of keanu walker to walk from bed to sink to wash hands. Pt requires SBA to wash hands and CGA to walk from sink to recliner chair. Pt requires CGA/SBA for stand to sit in recliner chair. Pt left seated in recliner chair with all needs met and within reach. Ambulation team in room for pt afternoon session. RN notified. Will continue POC. This section established at most recent assessment   PROBLEM LIST (Impairments causing functional limitations):  1. Decreased Strength  2. Decreased ADL/Functional Activities  3. Decreased Transfer Abilities  4. Decreased Ambulation Ability/Technique  5. Decreased Balance  6. Decreased Activity Tolerance  7. Increased Fatigue  8. Decreased Flexibility/Joint Mobility  9. Decreased Knowledge of Precautions  10. Decreased Terry with Home Exercise Program   INTERVENTIONS PLANNED: (Benefits and precautions of occupational therapy have been discussed with the patient.)  1. Activities of daily living training  2. Adaptive equipment training  3. Balance training  4. Clothing management  5. Cognitive training  6. Donning&doffing training  7. Keanu tech training  8. Neuromuscular re-eduation  9.  Therapeutic activity  10. Therapeutic exercise     TREATMENT PLAN: Frequency/Duration: Follow patient 3 times per week to address above goals. Rehabilitation Potential For Stated Goals: Excellent     REHAB RECOMMENDATIONS (at time of discharge pending progress):    Placement: It is my opinion, based on this patient's performance to date, that Mr. Sebastián Ramos may benefit from intensive therapy at an [address] [place] [address] after discharge due to potential to make ongoing and sustainable functional improvement that is of practical value. Lyle Valdez Pt functioning far below independent baseline, demonstrating good improvement and participation. Pt would likely benefit greatly and increase independence from inpatient rehab stay. Equipment:    TBD               OCCUPATIONAL PROFILE AND HISTORY:   History of Present Injury/Illness (Reason for Referral):  See H&P  Past Medical History/Comorbidities:   Mr. Sebastián Ramso  has a past medical history of Acute ischemic stroke ([address] (12/12/2019), Acute pancreatitis (11/19/2014), Cerebrovascular accident (CVA) due to embolism ([address] (12/12/2019), Diabetes ([address] (2002), Diabetes ([address] Diabetes mellitus, and Hypertension. He also has no past medical history of Arthritis, Asthma, Autoimmune disease ([address] CAD (coronary artery disease), Cancer ([address] Chronic kidney disease, COPD, Dementia, Dementia ([address] Heart failure ([address] Ill-defined condition, Infectious disease, Liver disease, Other ill-defined conditions(799.89), Psychiatric disorder, PUD (peptic ulcer disease), Seizures ([address] or Sleep disorder. Mr. Sebastián Ramos  has a past surgical history that includes hx hernia repair and hx orthopaedic.   Social History/Living Environment:   Home Environment: Apartment  # Steps to Enter: 12  Rails to Enter: Yes  [employer] : Bilateral  One/Two Story Residence: One story  Living Alone: No  Support Systems: Spouse/Significant Other/Partner  Patient Expects to be Discharged to[de-identified] Unknown  Current DME Used/Available at Home: None  Tub or Shower Type: Tub/Shower combination  Prior Level of Function/Work/Activity:  Pt lives at home with his wife. Pt is typically independent with ADL/functional mobility. Pt does not drive. Pt was working part-time at [employer]. Personal Factors:          Age:  53 y.o. Past/Current Experience:  CVA with flaccid L side        Other factors that influence how disability is experienced by the patient:  multiple co-morbidities    Number of Personal Factors/Comorbidities that affect the Plan of Care: Extensive review of physical, cognitive, and psychosocial performance (3+):  HIGH COMPLEXITY   ASSESSMENT OF OCCUPATIONAL PERFORMANCE[de-identified]   Activities of Daily Living:   Basic ADLs (From Assessment) Complex ADLs (From Assessment)   Feeding: Setup  Oral Facial Hygiene/Grooming: Minimum assistance  Bathing: Minimum assistance, Moderate assistance  Upper Body Dressing: Minimum assistance  Lower Body Dressing: Moderate assistance  Toileting: Minimum assistance Instrumental ADL  Meal Preparation: Total assistance  Homemaking: Total assistance  Medication Management: Total assistance  Financial Management: Total assistance   Grooming/Bathing/Dressing Activities of Daily Living   Grooming  Grooming Assistance: Stand-by assistance  Position Performed: Standing  Washing Hands: Stand-by assistance     Upper Body Bathing  Bathing Assistance: Min A   Position Performed: Seated in chair        Toileting  Toileting Assistance: Minimum assistance  Bladder Hygiene: Stand-by assistance  Clothing Management: Minimum assistance(Brief Management )         Lower Body Dressing Assistance  Socks:  Total assistance (dependent) Bed/Mat Mobility  Supine to Sit: Stand-by assistance  Sit to Stand: Contact guard assistance  Stand to Sit: Contact guard assistance;Stand-by assistance  Bed to Chair: Contact guard assistance  Scooting: Contact guard assistance     Most Recent Physical Functioning:   Gross Assessment:                  Posture:     Balance:  Sitting: Impaired  Sitting - Static: Good (unsupported)  Sitting - Dynamic: Fair (occasional)  Standing: Impaired  Standing - Static: Good  Standing - Dynamic : Fair Bed Mobility:  Supine to Sit: Stand-by assistance  Scooting: Contact guard assistance  Wheelchair Mobility:     Transfers:  Sit to Stand: Contact guard assistance  Stand to Sit: Contact guard assistance;Stand-by assistance  Bed to Chair: Contact guard assistance  Interventions: Safety awareness training; Tactile cues; Verbal cues            Patient Vitals for the past 6 hrs:   BP BP Patient Position SpO2 Pulse   20 1133 135/87 At rest 95 % 73       Mental Status  Neurologic State: Alert, Appropriate for age, Eyes open spontaneously  Orientation Level: Oriented X4  Cognition: Follows commands  Perception: Cues to attend to left side of body, Cues to maintain midline in sitting, Cues to maintain midline in standing  Perseveration: No perseveration noted  Safety/Judgement: Fall prevention, Home safety                          Physical Skills Involved:  1. Range of Motion  2. Balance  3. Strength  4. Activity Tolerance  5. Fine Motor Control  6. Gross Motor Control Cognitive Skills Affected (resulting in the inability to perform in a timely and safe manner):  1. Perception  2. Expression Psychosocial Skills Affected:  1. Habits/Routines  2. Environmental Adaptation  3. Social Interaction  4. Emotional Regulation  5. Self-Awareness  6. Awareness of Others  7. Social Roles   Number of elements that affect the Plan of Care: 5+:  HIGH COMPLEXITY   CLINICAL DECISION MAKIN [facility] [address] [id number] AM-PAC 6 Clicks   Daily Activity Inpatient Short Form  How much help from another person does the patient currently need. .. Total A Lot A Little None   1. Putting on and taking off regular lower body clothing? [] 1   [x] 2   [] 3   [] 4   2. Bathing (including washing, rinsing, drying)?    [] 1   [] 2   [x] 3 [] 4   3. Toileting, which includes using toilet, bedpan or urinal?   [] 1   [] 2   [x] 3   [] 4   4. Putting on and taking off regular upper body clothing? [] 1   [] 2   [x] 3   [] 4   5. Taking care of personal grooming such as brushing teeth? [] 1   [] 2   [x] 3   [] 4   6. Eating meals? [] 1   [] 2   [x] 3   [] 4   © 2007, Trustees of [address] [place] [address] [address] [id number], under license to [employer]. All rights reserved      Score:  Initial: 11 Most Recent: 17 (4/12/20)    Interpretation of Tool:  Represents activities that are increasingly more difficult (i.e. Bed mobility, Transfers, Gait). Medical Necessity:     · Patient demonstrates   · good and excellent  ·  rehab potential due to higher previous functional level. Reason for Services/Other Comments:  · Patient continues to require skilled intervention due to   · Decreased independence with ADL/functional transfers that impacts overall quality of life. · .   Use of outcome tool(s) and clinical judgement create a POC that gives a: MODERATE COMPLEXITY         TREATMENT:   (In addition to Assessment/Re-Assessment sessions the following treatments were rendered)     Pre-treatment Symptoms/Complaints: Pt reports no pain in L hand which he has been experiencing in prior sessions. Pain: Initial:   Pain Intensity 1: 0 Post Session: Unchanged     Self Care: (30 minutes): Procedure(s) below utilized to improve and/or restore self-care/home management as related to dressing and grooming. Required minimal to moderate visual, verbal and manual cueing to facilitate activities of daily living skills. Pt with soiled brief and requires CGA/SBA with use of luke walker to complete sit to stand. In standing, pt able to doff diaper brief to knees and requires Set Up/SBA to complete wiping. Pt requires SBA to return to seated edge of bed and doffs remainder of brief with use of R LE to remove.  Pt requires Min A to don clean pull up with assist needed for placement of bilateral LE in both leg holes. Pt required CGA/SBA with use of luke walker for sit to stand and SBA to don pull up above knees and buttocks. Pt requires CGA with use of luke walker to walk from bed to sink to wash hands. Pt requires SBA to wash hands and CGA to walk from sink to recliner chair. Therapeutic Exercise: (0 minutes):  Exercises per grid below to improve mobility, strength, balance and coordinationRequired moderate visual, verbal, manual and tactile cues to promote proper body alignment, promote proper body posture and promote proper body mechanics. Progressed repetitions and complexity of movement as indicated.      Date:  4/15/20 Date:  4/16/20 Date:  4/20/20 Date:   4/22/2020 Date:  4/24/2020 Date:  4/27/2020   Activity/Exercise Parameters Parameters Parameters Parameters Parameters Parameters   Shoulder flexion SROM/AAROM 15 reps with L UE supported at the elbow by R UE (not past 90 degrees) 15 reps with L UE supported at the elbow by R UE (not past 90 degrees) 20 reps with L UE supported at the elbow by R UE (not past 90 degrees)      Elbow flexion/extension SROM/AAROM 15 reps  15 reps  20 reps      Forearm supination/pronation  12 reps additional time 15 reps       Wrist extension  To ~15-20 degrees for 2 sets x 10 reps  15 reps 20 reps      Finger flexion/extension  AAROM for extension with end range stretch x 10 reps  Educated on SROM for finger extension stretch/finger flexion stretch  10 reps       Washcloth slides   Forward reach x 15 reps with mod facilitation; shoulder horizontal abd/add x 15 reps  Forward reach x 15 reps with mod facilitation; shoulder horizontal abd/add x 15 reps with mod facilitation      Forward Reaching with soccer ball    20 reps (using two handed technique with R hand over left; therapist supporting L elbow) 20 reps (using two handed technique with R hand over left; therapist supporting L elbow) 25 reps (using two handed technique with R hand over left; therapist supporting L elbow)   Crossing Midline with soccer ball       20 reps each way (using two handed technique with R hand over left; therapist supporting L elbow)   Shoulder Horizontal Abduction & Adduction with soccer ball    20 reps each way (using two handed technique with R hand over left; therapist supporting L elbow) 20 reps each way (using two handed technique with R hand over left; therapist supporting L elbow)    Shoulder Flexion & Crossing Midline with cones    14 reps (pt stabilized L wrist; therapist supporting L elbow) 14 reps (pt stabilized L wrist; therapist supporting L elbow) 14 reps (pt stabilized L wrist; therapist supporting L elbow)   Digit Flexion & Extension with pegs    24 reps (using two handed technique with assist needed for object release) 24 reps (using two handed technique with decreased assist needed for object release) 24 reps (using two handed technique with decreased assist needed for object release)   1st CMC Flexion/Extension     10 reps (using yellow putty with Total A from therapist to complete) 15 reps (using yellow putty with Total A from therapist to complete)     Neuromuscular Re-education: (0 minutes):  Exercise/activities per grid below to improve balance, coordination and posture. Required moderate visual, verbal, manual and tactile cues to promote dynamic balance in standing and promote motor control of left, upper extremity(s).            Date:  4/7/20 Date:  4/14/20  Date:  4/15/20 Date:   4/16/20   Activity/Exercise Parameters Parameters Parameters    Midline orientation sit to stand  Moderate cues and facilitation to decrease rotation at trunk and for midline trunk position Moderate facilitation at the L LE and for decreased rotation at the trunk     Midline sit to squat   Min to mod A w/ hands in his lap      Weightbearing into L side standing at sink  Minimal facilitation at the L UE; ~10 reps       Forward reach with cane 10 reps with facilitation at the elbow/scapula  10 reps with moderate facilitation at the elbow/scapula 15 reps with moderate facilitation at the elbow/scapula into protraction  15 reps with moderate facilitation at the elbow/scapula into protraction   External rotation with cane 10 reps with minimal facilitation  10 reps with minimal facilitation  15 reps with SBA/CGA  15 reps with SBA/CGA   Posture  Upright posture with thoracic extension and B hands place in the lap  Upright sitting/standing with verbal/visual cueing  Pt encouraged for upright posture with moderate cues throughout session  Pt encouraged for upright posture with moderate cues throughout session    Weightbearing into elbow   10 reps with moderate assistance pushing up into midline  2 sets with prolonged weight bearing and reaching to the L of midline for 2 sets x 15 reps  Sitting at the table with pt encouraged for propped elbows and weightbearing through the L with moderate cues    Weightbearing into hand  Mod to maximal assistance with facilitation at the elbow; 2 sets x 10 reps      Elbow flexion  10 reps AAROM; 10 reps holding ball in B hands      Grasp release of washcloth   10 reps with moderate to maximal facilitation for reaching   4-5 reps with additional time    Trunk Rotation    15 reps with minima facilitation  15 reps with minimal facilitation and additional time           Side Scooting    Minimal facilitation and assistance for positioning and weightbearing of L Hand through his L knee and forward forward flexion at the trunk       Braces/Orthotics/Lines/Etc:   · O2 device: Room air  Treatment/Session Assessment:    Response to Treatment:  Pt presented slightly tearful during session due to discussion with wife earlier in the day.   · Interdisciplinary Collaboration:   o Physical Therapist  o Occupational Therapist  o Registered Nurse  · After treatment position/precautions:   o Up in chair  o Bed/Chair-wheels locked  o Bed in low position  o Call light within reach  o RN notified  o Ambulation Team in room   · Compliance with Program/Exercises: Compliant all of the time, Will assess as treatment progresses. · Recommendations/Intent for next treatment session: \"Next visit will focus on advancements to more challenging activities and reduction in assistance provided\".   Total Treatment Duration:   OT Patient Time In/Time Out  Time In: 1503  Time Out: 1533     Mary Fairchild

## 2020-05-04 NOTE — DIABETES MGMT
Patient admitted with acute embolic stroke affecting left side. Lab blood glucose this morning was 95. Patient currently on Metformin 500mg BID. Patient sitting up in bed this AM watching TV. Questioned if patient would like to go over glucometer use as patient may need modifications due to decreased  in left hand. Patient states he will try. Patient able to remove test strip from bottle and properly place in meter. Patient had trouble removing cap from lancing device, but was able to do this and place lancet in device. Discussed alternative lancing devices and lancets as well. Reviewed recommended frequency of FSBS while on oral medications of twice weekly alternating fasting and 2 hours post prandial. Also discussed with patient that on current diet patient glucose levels are controlled but if patient diet changes patient may need more medication. Patient verbalized understanding and voices no questions at this time. Will continue to follow along loosely.

## 2020-05-04 NOTE — PROGRESS NOTES
Progress Note    2020  Admit Date: 2019  9:07 AM   NAME: Jade Monge   :  1975   MRN:  [id number]   Attending: Lilly Ochoa MD  PCP:  Yinka Valverde MD  Treatment Team: Attending Provider: Lilly Ochoa MD; Care Manager: Carolin Daugherty LMSW; Utilization Review: Sayra Montalvo RN; Nurse Practitioner: Fer Joseph NP; Care Manager: Rosa Jones RN; Hospitalist: Shawnee White NP; Charge Nurse: Lavinia Mahan; Nurse Practitioner: Lyndsay Zhou NP; Physical Therapist: Omar Fox DPT; Occupational Therapist: Sammie Hammer    Full Code   SUBJECTIVE:     Mr. Annie Sykes is a 54 yo male with PMH of DM, HTN who presented with c/o left sided weakness and left facial droop 19.   CT head showed age indeterminate infarctions within the left corona radiata/caudate.  CTA head/neck showed stenosis at the distal segment of the right vertebral artery and multifocal stenosis in the P2 segment of the left posterior cerebral artery. MRI brain showed acute to early subacute infarcts in the left cerebellar hemisphere, in the periventricular deep left frontoparietal white matter and likely additional areas of restricted diffusion in the right paramedian yariel.   Echo +PFO.  On statin, ASA.  Has been difficult to place in STR. Plans for 4 week event monitor on DC and if no arrhythmia PFO closure recommended. Pt remains stable today. Awaiting placement, medicaid pending.  Denies complaints.       Past Medical History:   Diagnosis Date    Acute ischemic stroke ([address] 2019    Acute pancreatitis 2014    Cerebrovascular accident (CVA) due to embolism ([address] 2019    Diabetes [facility] [address] 2002    Diabetes [facility] [address]     Diabetes mellitus     Hypertension      Recent Results (from the past 24 hour(s))   CBC WITH AUTOMATED DIFF    Collection Time: 20  4:08 AM   Result Value Ref Range    WBC 5.9 4.3 - 11.1 K/uL    RBC 4.06 (L) 4.23 - 5.6 M/uL    HGB 11.0 (L) 13.6 - 17.2 g/dL    HCT 34.2 (L) 41.1 - 50.3 %    MCV 84.2 79.6 - 97.8 FL    MCH 27.1 26.1 - 32.9 PG    MCHC 32.2 31.4 - 35.0 g/dL    RDW 16.2 (H) 11.9 - 14.6 %    PLATELET 025 324 - 437 K/uL    MPV 11.9 9.4 - 12.3 FL    ABSOLUTE NRBC 0.00 0.0 - 0.2 K/uL    DF AUTOMATED      NEUTROPHILS 50 43 - 78 %    LYMPHOCYTES 43 13 - 44 %    MONOCYTES 6 4.0 - 12.0 %    EOSINOPHILS 1 0.5 - 7.8 %    BASOPHILS 0 0.0 - 2.0 %    IMMATURE GRANULOCYTES 0 0.0 - 5.0 %    ABS. NEUTROPHILS 2.9 1.7 - 8.2 K/UL    ABS. LYMPHOCYTES 2.5 0.5 - 4.6 K/UL    ABS. MONOCYTES 0.3 0.1 - 1.3 K/UL    ABS. EOSINOPHILS 0.1 0.0 - 0.8 K/UL    ABS. BASOPHILS 0.0 0.0 - 0.2 K/UL    ABS. IMM. GRANS. 0.0 0.0 - 0.5 K/UL   METABOLIC PANEL, COMPREHENSIVE    Collection Time: 05/04/20  4:08 AM   Result Value Ref Range    Sodium 141 136 - 145 mmol/L    Potassium 4.2 3.5 - 5.1 mmol/L    Chloride 108 (H) 98 - 107 mmol/L    CO2 25 21 - 32 mmol/L    Anion gap 8 7 - 16 mmol/L    Glucose 95 65 - 100 mg/dL    BUN 18 6 - 23 MG/DL    Creatinine 0.96 0.8 - 1.5 MG/DL    GFR est AA >60 >60 ml/min/1.73m2    GFR est non-AA >60 >60 ml/min/1.73m2    Calcium 9.5 8.3 - 10.4 MG/DL    Bilirubin, total 0.4 0.2 - 1.1 MG/DL    ALT (SGPT) 18 12 - 65 U/L    AST (SGOT) 16 15 - 37 U/L    Alk.  phosphatase 68 50 - 136 U/L    Protein, total 6.5 6.3 - 8.2 g/dL    Albumin 2.7 (L) 3.5 - 5.0 g/dL    Globulin 3.8 (H) 2.3 - 3.5 g/dL    A-G Ratio 0.7 (L) 1.2 - 3.5       No Known Allergies  Current Facility-Administered Medications   Medication Dose Route Frequency Provider Last Rate Last Dose    magnesium oxide (MAG-OX) tablet 400 mg  400 mg Oral BID Tea Gary NP   400 mg at 05/04/20 5933    metFORMIN (GLUCOPHAGE) tablet 500 mg  500 mg Oral BID WITH MEALS Kizzy Morillo NP   500 mg at 05/04/20 0837    acetaminophen (TYLENOL) tablet 650 mg  650 mg Oral Q4H PRN Prashant Alexandre MD   650 mg at 05/04/20 0837    diphenhydrAMINE (BENADRYL) capsule 25 mg  25 mg Oral Q6H PRN William Griggs MD   25 mg at 05/04/20 0612    acetaminophen (TYLENOL) tablet 650 mg  650 mg Oral Q8H Ragini Chandra MD   650 mg at 05/04/20 0612    lip protectant (BLISTEX) ointment 1 Each  1 Each Topical PRN Bao Nathan MD        metoprolol succinate (TOPROL-XL) XL tablet 25 mg  25 mg Oral DAILY Courtney Mandujano MD   25 mg at 05/04/20 0836    polyethylene glycol (MIRALAX) packet 17 g  17 g Oral DAILY PRN Prim Goldie C, DO   17 g at 02/23/20 1708    guaiFENesin (ROBITUSSIN) 100 mg/5 mL oral liquid 100 mg  100 mg Oral Q4H PRN Courtney Mandujano MD   100 mg at 05/04/20 0612    hydrALAZINE (APRESOLINE) 20 mg/mL injection 20 mg  20 mg IntraVENous Q4H PRN Dora Antony MD   20 mg at 04/17/20 0510    atorvastatin (LIPITOR) tablet 80 mg  80 mg Oral QHS Dora Antony MD   80 mg at 05/03/20 2147    lisinopril (PRINIVIL, ZESTRIL) tablet 40 mg  40 mg Oral DAILY Dora Antony MD   40 mg at 05/04/20 6276    sodium chloride (NS) flush 5-40 mL  5-40 mL IntraVENous PRN Dora Antony MD   10 mL at 05/01/20 0537    ondansetron (ZOFRAN) injection 4 mg  4 mg IntraVENous Q4H PRN Dora Antony MD        aspirin chewable tablet 81 mg  81 mg Oral DAILY Dora Antony MD   81 mg at 05/04/20 0836    enoxaparin (LOVENOX) injection 40 mg  40 mg SubCUTAneous Q24H Dora Antony MD   40 mg at 05/03/20 1459    dextrose 40% (GLUTOSE) oral gel 1 Tube  15 g Oral PRN Dora Antony MD        glucagon [facility]) injection 1 mg  1 mg IntraMUSCular PRN Dora Antony MD        dextrose (D50W) injection syrg 12.5-25 g  25-50 mL IntraVENous PRN Dora Antony MD           Review of Systems negative with exception of pertinent positives noted above  PHYSICAL EXAM     Visit Vitals  /87 (BP 1 Location: Right arm, BP Patient Position: At rest)   Pulse 73   Temp 98.3 °F (36.8 °C)   Resp 18   Ht 5' 6\" (1.676 m)   Wt 79.8 kg (175 lb 14.4 oz)   SpO2 95%   BMI 28.39 kg/m²      Temp (24hrs), Av.1 °F (36.7 °C), Min:97.9 °F (36.6 °C), Max:98.5 °F (36.9 °C)    Oxygen Therapy  O2 Sat (%): 95 % (20 1133)  Pulse via Oximetry: 75 beats per minute (20 0400)  O2 Device: Room air (20 1648)    Intake/Output Summary (Last 24 hours) at 2020 1416  Last data filed at 5/3/2020 1729  Gross per 24 hour   Intake 240 ml   Output    Net 240 ml        General:       No acute distress    Lungs:          CTA bilaterally. Resp even and nonlabored  Heart:            S1S2 present without murmurs rubs gallops. RRR. No LE edema  Abdomen:    Soft, non tender, non distended. BS present  Extremities: No cyanosis. Left sided hemiparesis  Neurologic:  moves right hand and raises arm at elbow moderately, unable to squeeze my fingers left hand, mildly slurred speech.  PERRLA, strength 4/5 RUE/RLE.     Results summary of Diagnostic Studies/Procedures copied from within [facility] EMR:        [address] Problems    Diagnosis Date Noted    Hypomagnesemia 2020    PFO (patent foramen ovale) 2019    Arrhythmia 12/15/2019    Hyperlipemia     [facility]     Type 2 diabetes mellitus ([facility]     Hypertension      Plan:  Acute embolic stroke  MRI brain showed acute to early subacute infarcts in the left cerebellar hemisphere, in the periventricular deep left frontoparietal white matter and likely additional areas of restricted diffusion in the right paramedian yariel.    +PFO on echo  On ASA, statin  Will need 4 week event monitor on DC, if no arrhythmia then will need PFO closure      Hypomagnesemia  Replace IV today and on oral supplementation.      Hypertension  Continue metoprolol and ACE inhibitor  Goal BP <130/80     Type 2 diabetes mellitus:    Monitor, BGL controlled   A1C 6.9      Medically stable for DC.  Awaiting Medicaid approval.         Notes, labs, VS, diagnostic testing reviewed  Time spent with pt 20 min           DVT Prophylaxis: lovenox     Plan of Care Discussed with: Supervising MD  Dr. Ernst Marrufo, care team, pt        Carissa Cash, MARCELO      Pt updates wife daily.  Does not need me to call per pt.

## 2020-05-05 PROCEDURE — 97112 NEUROMUSCULAR REEDUCATION: CPT

## 2020-05-05 PROCEDURE — 74011250637 HC RX REV CODE- 250/637: Performed by: HOSPITALIST

## 2020-05-05 PROCEDURE — 74011250637 HC RX REV CODE- 250/637: Performed by: NURSE PRACTITIONER

## 2020-05-05 PROCEDURE — 97116 GAIT TRAINING THERAPY: CPT

## 2020-05-05 PROCEDURE — 74011250636 HC RX REV CODE- 250/636: Performed by: INTERNAL MEDICINE

## 2020-05-05 PROCEDURE — 74011250637 HC RX REV CODE- 250/637: Performed by: INTERNAL MEDICINE

## 2020-05-05 PROCEDURE — 97164 PT RE-EVAL EST PLAN CARE: CPT

## 2020-05-05 PROCEDURE — 65270000029 HC RM PRIVATE

## 2020-05-05 RX ADMIN — METFORMIN HYDROCHLORIDE 500 MG: 500 TABLET ORAL at 18:00

## 2020-05-05 RX ADMIN — DIPHENHYDRAMINE HYDROCHLORIDE 25 MG: 25 CAPSULE ORAL at 06:18

## 2020-05-05 RX ADMIN — ATORVASTATIN CALCIUM 80 MG: 80 TABLET, FILM COATED ORAL at 21:22

## 2020-05-05 RX ADMIN — ASPIRIN 81 MG 81 MG: 81 TABLET ORAL at 08:17

## 2020-05-05 RX ADMIN — LISINOPRIL 40 MG: 20 TABLET ORAL at 08:17

## 2020-05-05 RX ADMIN — ENOXAPARIN SODIUM 40 MG: 40 INJECTION SUBCUTANEOUS at 13:11

## 2020-05-05 RX ADMIN — GUAIFENESIN 100 MG: 100 SOLUTION ORAL at 06:18

## 2020-05-05 RX ADMIN — ACETAMINOPHEN 650 MG: 325 TABLET, FILM COATED ORAL at 21:22

## 2020-05-05 RX ADMIN — ACETAMINOPHEN 650 MG: 325 TABLET, FILM COATED ORAL at 06:18

## 2020-05-05 RX ADMIN — METFORMIN HYDROCHLORIDE 500 MG: 500 TABLET ORAL at 08:17

## 2020-05-05 RX ADMIN — GUAIFENESIN 100 MG: 100 SOLUTION ORAL at 21:22

## 2020-05-05 RX ADMIN — Medication 400 MG: at 08:17

## 2020-05-05 RX ADMIN — Medication 400 MG: at 18:00

## 2020-05-05 RX ADMIN — DIPHENHYDRAMINE HYDROCHLORIDE 25 MG: 25 CAPSULE ORAL at 21:22

## 2020-05-05 RX ADMIN — METOPROLOL SUCCINATE 25 MG: 25 TABLET, FILM COATED, EXTENDED RELEASE ORAL at 08:17

## 2020-05-05 RX ADMIN — ACETAMINOPHEN 650 MG: 325 TABLET, FILM COATED ORAL at 13:11

## 2020-05-05 NOTE — PROGRESS NOTES
Problem: Self Care Deficits Care Plan (Adult)  Goal: *Acute Goals and Plan of Care (Insert Text)  Description  Goals per Re-evaluation on 3/18/2020:   1. Patient will demonstrate appropriate safety awareness and protection of L UE during bed mobility and functional transfers with minimal cues. (Progressing, still needs cues, 4/14/20)  2. Patient will complete total body bathing and dressing with Min A and adaptive equipment as needed. (Progressing 4/14/20  3. Patient will complete weightbearing into the L UE with ADL tasks with minimal assistance to improve ability to use as a functional assist during ADL tasks. (Progressing 4/14/20)4. Patient will demonstrate L UE SROM HEP within 7 days. (Progressing, 3/18/2020)  5. Pt will complete bed mobility with Mod I and minimal verbal cueing (Progressing, Supervision, 4/14/20). 6. Pt will complete dynamic sitting balance for ADLs at mod I with good balance (Progressing, 4/14/20  7. Pt will complete functional transfers with Supervision with equipment as needed. (Progressing, SBA to CGA 4/14/20)  8. Pt will complete grooming tasks in standing at sink level x5 mins with good balance and equipment as needed MET  9. Pt will complete grooming standing at sink level with SBA and use of adaptive equipment as needed. MET  10. Pt will don/doff UE sling with SBA and use of minimal verbal and visual cueing for correct application (Progressing, Min A 4/14/20. Goals below remain appropriate as of 4/24/2020:  1. Patient will demonstrate appropriate safety awareness and protection of L UE during bed mobility and functional transfers with no verbal cues. CONTINUE   2. Patient will complete lower body bathing and dressing with Min A and adaptive equipment as needed. CONTINUE  3. Patient will complete upper body bathing and dressing with SBA and adaptive equipment as needed.  CONTINUE  4. Patient will complete weightbearing into the L UE with ADL tasks with minimal assistance to improve ability to use as a functional assist during ADL tasks. CONTINUE  5. Patient will demonstrate L UE SROM HEP within 7 days. CONTINUE  6. Pt will complete bed mobility with Mod I and no verbal cueing. CONTINUE  7. Pt will complete functional transfers with Supervision with equipment as needed. CONTINUE  8. Pt will don/doff UE sling with Mod I and no verbal cueing. CONTINUE  9. Pt will complete toileting with SBA for toilet transfer and gown management. CONTINUE  10. Patient will participate in using L UE as a functional assist for ADL for 15 minutes with minimal facilitation. CONTINUE  11. Patient will complete sit to stand at midline with minimal assistance and cueing. CONTINUE  12. Patient will complete therapeutic exercises with L UE with minimal tactile cues for facilitation of the LUE. CONTINUE    Outcome: Progressing Towards Goal     OCCUPATIONAL THERAPY: Daily Note and PM    5/5/2020  INPATIENT: OT Visit Days: 5  Payor: [id number] / Plan: [place] / Product Type: Medicaid /      NAME/AGE/GENDER: Edilberto Dos Santos is a 53 y.o. male   PRIMARY DIAGNOSIS:  Acute ischemic stroke ([address] [I63.9]  Cerebrovascular accident (CVA) due to embolism ([address] [Y51.8] Acute embolic stroke ([address] Acute embolic stroke ([address]       ICD-10: Treatment Diagnosis:    · Generalized Muscle Weakness (M62.81)  · Other lack of cordination (R27.8)  · Hemiplegia and hemiparesis following cerebral infarction affecting   · left non-dominant side (I69.354)  · Abnormal posture (R29.3)   Precautions/Allergies:    NO PULLING ON LUE   LUE in sling with shoulder joint approximated and supported, needs taping   Patient has no known allergies. ASSESSMENT:     Mr. Romina Orlando presents to the hospital with L sided weakness and acute ischemic CVA. MRI revealed acute to subacute L cerebellar and R paramedian yariel infarcts.      5/5/2020: Pt supine in bed upon arrival.  Focus on session on left UE function, PROM, self ROM, tendon glides. Left tendons tight and painful but still soft end feel with wrist extension/flexion and digit grasp. Reviewed Self ROM and use of resistive sponge. Patient has trace arm movement in many planes and will continue to benefit from consistent OT at d/c and in hospital.     This section established at most recent assessment   PROBLEM LIST (Impairments causing functional limitations):  1. Decreased Strength  2. Decreased ADL/Functional Activities  3. Decreased Transfer Abilities  4. Decreased Ambulation Ability/Technique  5. Decreased Balance  6. Decreased Activity Tolerance  7. Increased Fatigue  8. Decreased Flexibility/Joint Mobility  9. Decreased Knowledge of Precautions  10. Decreased [place] with Home Exercise Program   INTERVENTIONS PLANNED: (Benefits and precautions of occupational therapy have been discussed with the patient.)  1. Activities of daily living training  2. Adaptive equipment training  3. Balance training  4. Clothing management  5. Cognitive training  6. Donning&doffing training  7. Keanu tech training  8. Neuromuscular re-eduation  9. Therapeutic activity  10. Therapeutic exercise     TREATMENT PLAN: Frequency/Duration: Follow patient 3 times per week to address above goals. Rehabilitation Potential For Stated Goals: Excellent     REHAB RECOMMENDATIONS (at time of discharge pending progress):    Placement: It is my opinion, based on this patient's performance to date, that Mr. Aristeo Rascon may benefit from intensive therapy at an [address] [place] [address] after discharge due to potential to make ongoing and sustainable functional improvement that is of practical value. Yannick Angry Pt functioning far below independent baseline, demonstrating good improvement and participation. Pt would likely benefit greatly and increase independence from inpatient rehab stay.    Equipment:    TBD               OCCUPATIONAL PROFILE AND HISTORY:   History of Present Injury/Illness (Reason for Referral):  See H&P  Past Medical History/Comorbidities:   Mr. Antony Murray  has a past medical history of Acute ischemic stroke ([facility] (12/12/2019), Acute pancreatitis (11/19/2014), Cerebrovascular accident (CVA) due to embolism ([address] (12/12/2019), Diabetes ([address] (2002), Diabetes ([address] Diabetes mellitus, and Hypertension. He also has no past medical history of Arthritis, Asthma, Autoimmune disease ([address] CAD (coronary artery disease), Cancer ([address] Chronic kidney disease, COPD, Dementia, Dementia ([address] Heart failure ([address] Ill-defined condition, Infectious disease, Liver disease, Other ill-defined conditions(799.89), Psychiatric disorder, PUD (peptic ulcer disease), Seizures ([address] or Sleep disorder. Mr. Antony Murray  has a past surgical history that includes hx hernia repair and hx orthopaedic. Social History/Living Environment:   Home Environment: Apartment  # Steps to Enter: 12  Rails to Enter: Yes  [employer] : Bilateral  One/Two Story Residence: One story  Living Alone: No  Support Systems: Spouse/Significant Other/Partner  Patient Expects to be Discharged to[de-identified] Unknown  Current DME Used/Available at Home: None  Tub or Shower Type: Tub/Shower combination  Prior Level of Function/Work/Activity:  Pt lives at home with his wife. Pt is typically independent with ADL/functional mobility. Pt does not drive. Pt was working part-time at [employer]. Personal Factors:          Age:  53 y.o.         Past/Current Experience:  CVA with flaccid L side        Other factors that influence how disability is experienced by the patient:  multiple co-morbidities    Number of Personal Factors/Comorbidities that affect the Plan of Care: Extensive review of physical, cognitive, and psychosocial performance (3+):  HIGH COMPLEXITY   ASSESSMENT OF OCCUPATIONAL PERFORMANCE[de-identified]   Activities of Daily Living:   Basic ADLs (From Assessment) Complex ADLs (From Assessment)   Feeding: Setup  Oral Facial Hygiene/Grooming: Minimum assistance  Bathing: Minimum assistance, Moderate assistance  Upper Body Dressing: Minimum assistance  Lower Body Dressing: Moderate assistance  Toileting: Minimum assistance Instrumental ADL  Meal Preparation: Total assistance  Homemaking: Total assistance  Medication Management: Total assistance  Financial Management: Total assistance   Grooming/Bathing/Dressing Activities of Daily Living         Upper Body Bathing  Bathing Assistance: Min A   Position Performed: Seated in chair                  Lower Body Dressing Assistance  Socks: Total assistance (dependent)       Most Recent Physical Functioning:   Gross Assessment:                  Posture:     Balance:    Bed Mobility:     Wheelchair Mobility:     Transfers:               Patient Vitals for the past 6 hrs:   BP BP Patient Position SpO2 Pulse   20 1130 125/82 At rest 97 % 79       Mental Status  Neurologic State: Alert  Orientation Level: Oriented X4  Cognition: Follows commands  Perception: Cues to attend to left side of body, Cues to maintain midline in sitting, Cues to maintain midline in standing  Perseveration: No perseveration noted  Safety/Judgement: Fall prevention, Home safety                          Physical Skills Involved:  1. Range of Motion  2. Balance  3. Strength  4. Activity Tolerance  5. Fine Motor Control  6. Gross Motor Control Cognitive Skills Affected (resulting in the inability to perform in a timely and safe manner):  1. Perception  2. Expression Psychosocial Skills Affected:  1. Habits/Routines  2. Environmental Adaptation  3. Social Interaction  4. Emotional Regulation  5. Self-Awareness  6. Awareness of Others  7. Social Roles   Number of elements that affect the Plan of Care: 5+:  HIGH COMPLEXITY   CLINICAL DECISION MAKIN [facility] [address] [id number] AM-PAC 6 Clicks   Daily Activity Inpatient Short Form  How much help from another person does the patient currently need. .. Total A Lot A Little None   1.   Putting on and taking off regular lower body clothing? [] 1   [x] 2   [] 3   [] 4   2. Bathing (including washing, rinsing, drying)? [] 1   [] 2   [x] 3   [] 4   3. Toileting, which includes using toilet, bedpan or urinal?   [] 1   [] 2   [x] 3   [] 4   4. Putting on and taking off regular upper body clothing? [] 1   [] 2   [x] 3   [] 4   5. Taking care of personal grooming such as brushing teeth? [] 1   [] 2   [x] 3   [] 4   6. Eating meals? [] 1   [] 2   [x] 3   [] 4   © 2007, Trustees of [address] [place] [address] [address] [id number], under license to [employer]. All rights reserved      Score:  Initial: 11 Most Recent: 17 (4/12/20)    Interpretation of Tool:  Represents activities that are increasingly more difficult (i.e. Bed mobility, Transfers, Gait). Medical Necessity:     · Patient demonstrates   · good and excellent  ·  rehab potential due to higher previous functional level. Reason for Services/Other Comments:  · Patient continues to require skilled intervention due to   · Decreased independence with ADL/functional transfers that impacts overall quality of life. · .   Use of outcome tool(s) and clinical judgement create a POC that gives a: MODERATE COMPLEXITY         TREATMENT:   (In addition to Assessment/Re-Assessment sessions the following treatments were rendered)     Pre-treatment Symptoms/Complaints: Pt reports no pain in L hand which he has been experiencing in prior sessions. Pain: Initial:   Pain Intensity 1: 0 Post Session: Unchanged     Neuromuscular Re-education ( 40): Focus on session on left UE function, PROM, self ROM, tendon glides. Left tendons tight and painful but still soft end feel with wrist extension/flexion and digit grasp. Reviewed Self ROM and use of resistive sponge. Push and pull exercises with writer providing resistance at distal left UE as well. Making progress.       Date:  4/15/20 Date:  4/16/20 Date:  4/20/20 Date:   4/22/2020 Date:  4/24/2020 Date:  4/27/2020   Activity/Exercise Parameters Parameters Parameters Parameters Parameters Parameters   Shoulder flexion SROM/AAROM 15 reps with L UE supported at the elbow by R UE (not past 90 degrees) 15 reps with L UE supported at the elbow by R UE (not past 90 degrees) 20 reps with L UE supported at the elbow by R UE (not past 90 degrees)      Elbow flexion/extension SROM/AAROM 15 reps  15 reps  20 reps      Forearm supination/pronation  12 reps additional time 15 reps       Wrist extension  To ~15-20 degrees for 2 sets x 10 reps  15 reps 20 reps      Finger flexion/extension  AAROM for extension with end range stretch x 10 reps  Educated on SROM for finger extension stretch/finger flexion stretch  10 reps       Washcloth slides   Forward reach x 15 reps with mod facilitation; shoulder horizontal abd/add x 15 reps  Forward reach x 15 reps with mod facilitation; shoulder horizontal abd/add x 15 reps with mod facilitation      Forward Reaching with soccer ball    20 reps (using two handed technique with R hand over left; therapist supporting L elbow) 20 reps (using two handed technique with R hand over left; therapist supporting L elbow) 25 reps (using two handed technique with R hand over left; therapist supporting L elbow)   Crossing Midline with soccer ball       20 reps each way (using two handed technique with R hand over left; therapist supporting L elbow)   Shoulder Horizontal Abduction & Adduction with soccer ball    20 reps each way (using two handed technique with R hand over left; therapist supporting L elbow) 20 reps each way (using two handed technique with R hand over left; therapist supporting L elbow)    Shoulder Flexion & Crossing Midline with cones    14 reps (pt stabilized L wrist; therapist supporting L elbow) 14 reps (pt stabilized L wrist; therapist supporting L elbow) 14 reps (pt stabilized L wrist; therapist supporting L elbow)   Digit Flexion & Extension with pegs    24 reps (using two handed technique with assist needed for object release) 24 reps (using two handed technique with decreased assist needed for object release) 24 reps (using two handed technique with decreased assist needed for object release)   1st CMC Flexion/Extension     10 reps (using yellow putty with Total A from therapist to complete) 15 reps (using yellow putty with Total A from therapist to complete)     Neuromuscular Re-education: (0 minutes):  Exercise/activities per grid below to improve balance, coordination and posture. Required moderate visual, verbal, manual and tactile cues to promote dynamic balance in standing and promote motor control of left, upper extremity(s).            Date:  4/7/20 Date:  4/14/20  Date:  4/15/20 Date:   4/16/20   Activity/Exercise Parameters Parameters Parameters    Midline orientation sit to stand  Moderate cues and facilitation to decrease rotation at trunk and for midline trunk position Moderate facilitation at the L LE and for decreased rotation at the trunk     Midline sit to squat   Min to mod A w/ hands in his lap      Weightbearing into L side standing at sink  Minimal facilitation at the L UE; ~10 reps       Forward reach with cane 10 reps with facilitation at the elbow/scapula  10 reps with moderate facilitation at the elbow/scapula 15 reps with moderate facilitation at the elbow/scapula into protraction  15 reps with moderate facilitation at the elbow/scapula into protraction   External rotation with cane 10 reps with minimal facilitation  10 reps with minimal facilitation  15 reps with SBA/CGA  15 reps with SBA/CGA   Posture  Upright posture with thoracic extension and B hands place in the lap  Upright sitting/standing with verbal/visual cueing  Pt encouraged for upright posture with moderate cues throughout session  Pt encouraged for upright posture with moderate cues throughout session    Weightbearing into elbow   10 reps with moderate assistance pushing up into midline  2 sets with prolonged weight bearing and reaching to the L of midline for 2 sets x 15 reps  Sitting at the table with pt encouraged for propped elbows and weightbearing through the L with moderate cues    Weightbearing into hand  Mod to maximal assistance with facilitation at the elbow; 2 sets x 10 reps      Elbow flexion  10 reps AAROM; 10 reps holding ball in B hands      Grasp release of washcloth   10 reps with moderate to maximal facilitation for reaching   4-5 reps with additional time    Trunk Rotation    15 reps with minima facilitation  15 reps with minimal facilitation and additional time           Side Scooting    Minimal facilitation and assistance for positioning and weightbearing of L Hand through his L knee and forward forward flexion at the trunk       Braces/Orthotics/Lines/Etc:   · O2 device: Room air  Treatment/Session Assessment:    Response to Treatment:  Pt presented slightly tearful during session due to discussion with wife earlier in the day. · Interdisciplinary Collaboration:   o Physical Therapist  o Occupational Therapist  o Registered Nurse  · After treatment position/precautions:   o Up in chair  o Bed/Chair-wheels locked  o Bed in low position  o Call light within reach  o RN notified  o Ambulation Team in room   · Compliance with Program/Exercises: Compliant all of the time, Will assess as treatment progresses. · Recommendations/Intent for next treatment session: \"Next visit will focus on advancements to more challenging activities and reduction in assistance provided\".   Total Treatment Duration:   OT Patient Time In/Time Out  Time In: 1235  Time Out: [address]

## 2020-05-05 NOTE — PROGRESS NOTES
Progress Note    2020  Admit Date: 2019  9:07 AM   NAME: Dora Neal   :  1975   MRN:  [id number]   Attending: Jerrell Frey MD  PCP:  Kj Sullivan MD  Treatment Team: Attending Provider: Jerrell Frey MD; Care Manager: Kapil Henderson LMSW; Utilization Review: Porsche Nunez RN; Nurse Practitioner: Ronaldo Figueroa NP; Care Manager: Bridger Mcrae RN; Hospitalist: Maty Bryant NP; Nurse Practitioner: Santi Choudhary NP; Charge Nurse: [facility]; Physical Therapist: Grisel Restrepo DPT; Occupational Therapist: Kim Winston OT    Full Code   SUBJECTIVE:     Mr. Anjel Gil is a 54 yo male with PMH of DM, HTN who presented with c/o left sided weakness and left facial droop 19.   CT head showed age indeterminate infarctions within the left corona radiata/caudate.  CTA head/neck showed stenosis at the distal segment of the right vertebral artery and multifocal stenosis in the P2 segment of the left posterior cerebral artery. MRI brain showed acute to early subacute infarcts in the left cerebellar hemisphere, in the periventricular deep left frontoparietal white matter and likely additional areas of restricted diffusion in the right paramedian yariel.   Echo +PFO.  On statin, ASA.  Has been difficult to place in STR. Plans for 4 week event monitor on DC and if no arrhythmia PFO closure recommended. Pt remains stable today. Awaiting placement, medicaid pending. Denies complaints. Progressing well with PT. Past Medical History:   Diagnosis Date    Acute ischemic stroke ([address] 2019    Acute pancreatitis 2014    Cerebrovascular accident (CVA) due to embolism ([address] 2019    Diabetes ([facility] 2002    Diabetes ([address]     Diabetes mellitus     Hypertension      No results found for this or any previous visit (from the past 25 hour(s)).   No Known Allergies  Current Facility-Administered Medications   Medication Dose Route Frequency Provider Last Rate Last Dose    magnesium oxide (MAG-OX) tablet 400 mg  400 mg Oral BID AminalanieDanakyrie, NP   400 mg at 05/05/20 0817    metFORMIN (GLUCOPHAGE) tablet 500 mg  500 mg Oral BID WITH MEALS Bernice Pérez, NP   500 mg at 05/05/20 0817    acetaminophen (TYLENOL) tablet 650 mg  650 mg Oral Q4H PRN Lor Deleon MD   650 mg at 05/04/20 5849    diphenhydrAMINE (BENADRYL) capsule 25 mg  25 mg Oral Q6H PRN Pattie Frances MD   25 mg at 05/05/20 9191    acetaminophen (TYLENOL) tablet 650 mg  650 mg Oral Grady Cadena MD   650 mg at 05/05/20 0618    lip protectant (BLISTEX) ointment 1 Each  1 Each Topical PRN Radha German MD        metoprolol succinate (TOPROL-XL) XL tablet 25 mg  25 mg Oral DAILY Lor Deleon MD   25 mg at 05/05/20 0817    polyethylene glycol (MIRALAX) packet 17 g  17 g Oral DAILY PRN Don Matter C, DO   17 g at 02/23/20 1708    guaiFENesin (ROBITUSSIN) 100 mg/5 mL oral liquid 100 mg  100 mg Oral Q4H PRN Lor Deleon MD   100 mg at 05/05/20 0618    hydrALAZINE (APRESOLINE) 20 mg/mL injection 20 mg  20 mg IntraVENous Q4H PRN Riddhi Smith MD   20 mg at 04/17/20 0510    atorvastatin (LIPITOR) tablet 80 mg  80 mg Oral QHS Riddhi Smith MD   80 mg at 05/04/20 2143    lisinopril (PRINIVIL, ZESTRIL) tablet 40 mg  40 mg Oral DAILY Riddhi Smith MD   40 mg at 05/05/20 6557    sodium chloride (NS) flush 5-40 mL  5-40 mL IntraVENous PRN Riddhi Smith MD   10 mL at 05/01/20 0537    ondansetron (ZOFRAN) injection 4 mg  4 mg IntraVENous Q4H PRN Riddhi Smith MD        aspirin chewable tablet 81 mg  81 mg Oral DAILY Riddhi Smith MD   81 mg at 05/05/20 1238    enoxaparin (LOVENOX) injection 40 mg  40 mg SubCUTAneous Q24H Riddhi Smith MD   40 mg at 05/04/20 1437    dextrose 40% (GLUTOSE) oral gel 1 Tube  15 g Oral PRN Riddhi Smith MD        glucagon [facility]) injection 1 mg  1 mg IntraMUSCular PRN Srinivasa Thomas, Kaye Steele MD        dextrose (D50W) injection syrg 12.5-25 g  25-50 mL IntraVENous PRN Guillermina Huynh MD           Review of Systems negative with exception of pertinent positives noted above  PHYSICAL EXAM     Visit Vitals  /89 (BP 1 Location: Right arm, BP Patient Position: At rest)   Pulse 74   Temp 97.7 °F (36.5 °C)   Resp 18   Ht 5' 6\" (1.676 m)   Wt 79.8 kg (175 lb 14.4 oz)   SpO2 97%   BMI 28.39 kg/m²      Temp (24hrs), Av °F (36.7 °C), Min:97.7 °F (36.5 °C), Max:98.3 °F (36.8 °C)    Oxygen Therapy  O2 Sat (%): 97 % (20 0826)  Pulse via Oximetry: 75 beats per minute (20 0400)  O2 Device: Room air (20 1648)  No intake or output data in the 24 hours ending 20 1055       General:       No acute distress    Lungs:          CTA bilaterally. Resp even and nonlabored  Heart:            S1S2 present without murmurs rubs gallops. RRR. No LE edema  Abdomen:    Soft, non tender, non distended. BS present  Extremities: No cyanosis. Left sided hemiparesis  Neurologic:  moves right hand and raises arm at elbow moderately, unable to squeeze my fingers left hand, mildly slurred speech.  PERRLA, strength 4/5 RUE/RLE. Results summary of Diagnostic Studies/Procedures copied from within [facility] EMR:    Edmar Dixon 96 Problems    Diagnosis Date Noted    Hypomagnesemia 2020    PFO (patent foramen ovale) 2019    Arrhythmia 12/15/2019    Hyperlipemia     Acute [facility]olic [facility] ([facility]     Type 2 diabetes mellitus ([facility]     Hypertension      Plan:       Acute embolic stroke  MRI brain showed acute to early subacute infarcts in the left cerebellar hemisphere, in the periventricular deep left frontoparietal white matter and likely additional areas of restricted diffusion in the right paramedian yariel.    +PFO on echo  On ASA, statin  Will need 4 week event monitor on DC, if no arrhythmia then will need PFO closure      Hypomagnesemia   on oral supplementation  Check Mag in AM     Hypertension  Continue metoprolol and ACE inhibitor  Goal BP <130/80     Type 2 diabetes mellitus:    Monitor, BGL controlled   A1C 6.9      Medically stable for DC.  Awaiting Medicaid approval.         Notes, labs, VS, diagnostic testing reviewed  Time spent with pt 20 min           DVT Prophylaxis: lovenox     Plan of Care Discussed with: Supervising MD  Dr. Ebony Cano, care team, pt        Alejandra Birch, MARCELO      Pt updates wife daily.  Does not need me to call per pt.

## 2020-05-05 NOTE — PROGRESS NOTES
Problem: Mobility Impaired (Adult and Pediatric)  Goal: *Acute Goals and Plan of Care  Description  GOALS UPDATED ON RE-ASSESSMENT 5/5/2020 (BOLD INDICATES UPDATED GOAL):  1. Patient will perform bed mobility with MODIFIED INDEPENDENCE and 0 verbal cues within 7 treatment days. 2. Patient will perform transfer bed to chair/wheelchair with MODIFIED INDEPENDENCE within 7 treatment days. 3. Patient will demonstrate FAIR+ dynamic balance throughout ambulation within 7 treatment days. 4. Patient demonstrate 3+/5 strength in L LE hip flexion, hip abduction, and hip adduction within 7 treatment days. 5. Patient will ambulate 300ft+ with STAND BY ASSIST and least retrictive assistive device and L LE AFO within 7 treatment days. 6. Patient will perform wheelchair mobility 75 ft with modified independence within 7 treatment days. 7. Patient will don/doff LUE sling for protection of L shoulder during transfers/gait with set up within 7 treatment days. 8. Patient will don/doff L LE AFO with MINIMAL ASSISTANCE within 7 treatment days. PHYSICAL THERAPY: Daily Note, Re-evaluation and PM 5/5/2020  INPATIENT: PT Visit Days : 1  [facility] [id number] [facility] [address] / Plan: [address] [place] [address] / Product Type: Medicaid /       NAME/AGE/GENDER: Emmanuel Lloyd is a 53 y.o. male   PRIMARY DIAGNOSIS: Acute ischemic stroke ([facility] [I63.9]  Cerebrovascular accident (CVA) due to embolism ([address] [Q00.2] Acute embolic stroke ([facility] [address] Acute embolic stroke ([facility] [address]       ICD-10: Treatment Diagnosis:   · Difficulty in walking, Not elsewhere classified (R26.2)  · Hemiplegia and hemiparesis following cerebral infarction affecting left non-dominant side (X33.412)   Precaution/Allergies:  Patient has no known allergies. ASSESSMENT:     Mr. Henriquez is a 53year old admitted R MCA CVA. Patient seen this PM for physical therapy re-evaluation to address progression toward acute PT goals.  Patient is currently requiring supervision with bed mobility with occasional cueing, SBA-CGA with transfers, and CGA with ambulation for community level distance. Patient continues to demonstrate weakness is L UE/LE. Improved L UE strength appreciated in shoulder flexion/extension, supination/pronation, and wrist flexion/extention. Improved proximal L UE strength and improve L LE dorsiflexion/plantarflexion as well. Discussed progress with patient and set goals for continued acute treatment. Encouragement provided to the patient. (Spiritual care aware of patient's need for emotional support). Treatment initiated to include bed mobility, transfers, weight shifts, gait training x400ft with luke walker, gait belt and CGA. Demonstrated a hemipalegic gait pattern on L LE with mild L LE circumduction. Improved mechanics on heel strike and stance phase appreciated with improved quad activation and less hyperextension through terminal stance. Mile balance checks x3 through and patient able to self correct without therapist intervention. Also addressed neuromuscular re-education including standing hip ab/adduction, extension, and flexion to address dynamic standing balance and L LE proximal muscle activation patterns. Also addressed STS transfer with facilitation of L UE to assist with weight bearing for push off and controlled eccentric decent. Good control, SBA for transfer STS. Patient continues to make steady progress toward acute PT goals. Continue to recommend inpatient rehabilitation stay; difficult discharge placement secondary to no payor. Will continued to follow with updated plan of care. (Goals updated to reflect patient status and in anticipation of AFO arrival tomorrow). This section established at most recent assessment   PROBLEM LIST (Impairments causing functional limitations):  1. Decreased Strength  2. Decreased ADL/Functional Activities  3. Decreased Transfer Abilities  4. Decreased Ambulation Ability/Technique  5.  Decreased Balance  6. Increased Pain  7. Decreased Activity Tolerance  8. Increased Fatigue  9. Decreased Flexibility/Joint Mobility   INTERVENTIONS PLANNED: (Benefits and precautions of physical therapy have been discussed with the patient.)  1. Balance Exercise  2. Bed Mobility  3. Family Education  4. Gait Training  5. Home Exercise Program (HEP)  6. Manual Therapy  7. Neuromuscular Re-education/Strengthening  8. Range of Motion (ROM)  9. Therapeutic Activites  10. Therapeutic Exercise/Strengthening  11. Transfer Training     TREATMENT PLAN: Frequency/Duration: 5 times a week for duration of hospital stay  Rehabilitation Potential For Stated Goals: Good     REHAB RECOMMENDATIONS (at time of discharge pending progress):    Placement: It is my opinion, based on this patient's performance to date, that Mr. Jackie Anderson may benefit from intensive therapy at an [address] [place] [address] after discharge due to a probable need for close medical supervision by a rehab physician, a probable need for multiple therapy disciplines and potential to make ongoing and sustainable functional improvement that is of practical value. .  Equipment:    TBD pending progress with therapy. HISTORY:   History of Present Injury/Illness (Reason for Referral):  Per H&P: \"Pt is a 54 y/o smoker with DM, HTN, who presented to ER with L leg and arm weakness, L facial droop, dysarthria. First noted L leg weakness late night 12/11 when he woke up to go to the bathroom. He was normal when he went to bed around 1030. Woke up this morning and had persistent weakness L leg and also now noted in L arm. EMS called. Noted to have slurred speech and L facial droop as well. Code S called in ER around 9am.  MRI with acute infarcts in L cerebellar hemisphere, deep frontoparietal white matter, and R paramedian yariel. Also noted old lacunar infarcts. No large vessel occlusion on CTA, but some stenosis noted. No hx afib, TIA, CVA.   No CP, palpitations, SOB. \"  Past Medical History/Comorbidities:   Mr. Anoop Myers  has a past medical history of Acute ischemic stroke ([facility] [address] (12/12/2019), Acute pancreatitis (11/19/2014), Cerebrovascular accident (CVA) due to embolism ([address] (12/12/2019), Diabetes ([address] (2002), Diabetes ([address] Diabetes mellitus, and Hypertension. He also has no past medical history of Arthritis, Asthma, Autoimmune disease ([address] CAD (coronary artery disease), Cancer ([address] Chronic kidney disease, COPD, Dementia, Dementia ([address] Heart failure ([address] Ill-defined condition, Infectious disease, Liver disease, Other ill-defined conditions(799.89), Psychiatric disorder, PUD (peptic ulcer disease), Seizures ([address] or Sleep disorder. Mr. Anoop Myers  has a past surgical history that includes hx hernia repair and hx orthopaedic. Social History/Living Environment:   Home Environment: Apartment  # Steps to Enter: 12  Rails to Enter: Yes  [employer] : Bilateral  One/Two Story Residence: One story  Living Alone: No  Support Systems: Spouse/Significant Other/Partner  Patient Expects to be Discharged to[de-identified] Unknown  Current DME Used/Available at Home: None  Tub or Shower Type: Tub/Shower combination  Prior Level of Function/Work/Activity:  Independent, lives with wife in 2nd story 1 level apartment. No recent falls.    Number of Personal Factors/Comorbidities that affect the Plan of Care: 1-2: MODERATE COMPLEXITY   EXAMINATION:   Most Recent Physical Functioning:   Gross Assessment: left hip grossly 2/5 to 3-/5  AROM: Generally decreased, functional(L UE/LE)  PROM: Within functional limits  Strength: Generally decreased, functional(L UE/LE)  Coordination: Generally decreased, functional(L UE/LE)  Tone: Abnormal(L UE; L LE)  Sensation: Intact(light touch B UE/LE)                    Balance:  Sitting: Impaired  Sitting - Static: Good (unsupported)  Sitting - Dynamic: Fair (occasional)  Standing: Impaired  Standing - Static: Good  Standing - Dynamic : Fair Bed Mobility:  Supine to Sit: Stand-by assistance; Additional time  Sit to Supine: Stand-by assistance  Scooting: Contact guard assistance; Additional time  Wheelchair Mobility: NT     Transfers:  Sit to Stand: Stand-by assistance  Stand to Sit: Stand-by assistance  Bed to Chair: Contact guard assistance  Interventions: Safety awareness training; Tactile cues; Verbal cues  Gait:     Base of Support: Center of gravity altered;Shift to right  Gait Abnormalities: Hemiplegic;Circumduction  Distance (ft): 400 Feet (ft)  Assistive Device: Walker luke;Gait belt  Ambulation - Level of Assistance: Contact guard assistance      Body Structures Involved:  1. Nerves  2. Voice/Speech  3. Bones  4. Joints  5. Muscles Body Functions Affected:  1. Mental  2. Sensory/Pain  3. Neuromusculoskeletal  4. Movement Related Activities and Participation Affected:  1. General Tasks and Demands  2. Mobility  3. Self Care  4. Interpersonal Interactions and Relationships   Number of elements that affect the Plan of Care: 4+: HIGH COMPLEXITY   CLINICAL PRESENTATION:   Presentation: Evolving clinical presentation with changing clinical characteristics: MODERATE COMPLEXITY   CLINICAL DECISION MAKIN [facility] Inpatient Short Form  How much difficulty does the patient currently have. .. Unable A Lot A Little None   1. Turning over in bed (including adjusting bedclothes, sheets and blankets)? [] 1   [] 2   [] 3   [x] 4   2. Sitting down on and standing up from a chair with arms ( e.g., wheelchair, bedside commode, etc.)   [] 1   [] 2   [x] 3   [] 4   3. Moving from lying on back to sitting on the side of the bed? [] 1   [] 2   [] 3   [x] 4   How much help from another person does the patient currently need. .. Total A Lot A Little None   4. Moving to and from a bed to a chair (including a wheelchair)? [] 1   [] 2   [x] 3   [] 4   5. Need to walk in hospital room? [] 1   [] 2   [x] 3   [] 4   6. Climbing 3-5 steps with a railing? [] 1   [x] 2   [] 3   [] 4   © 2007, Trustees of [address] [place] [address] [address] [id number], under license to [employer]. All rights reserved      Score:  Initial: 13 Most Recent: 19 (Date: 5/5/2020)    Interpretation of Tool:  Represents activities that are increasingly more difficult (i.e. Bed mobility, Transfers, Gait). Medical Necessity:     · Patient is expected to demonstrate progress in   · strength, range of motion, balance, coordination, and functional technique  ·  to   · increase independence with all mobility. · .  Reason for Services/Other Comments:  · Patient continues to require skilled intervention due to   · medical complications and mobility deficits which impact his level of function, safety, and independence as indicated above. · .   Use of outcome tool(s) and clinical judgement create a POC that gives a: Questionable prediction of patient's progress: MODERATE COMPLEXITY        TREATMENT:      Pre-treatment Symptoms/Complaints: see above  Pain: Initial: 0/10 Post Session:  0/10       Re-evaluation (untimed charge)    Gait Training (24 Minutes):  Gait training to improve and/or restore physical functioning as related to mobility, strength and balance. Ambulated 400 Feet (ft) with guard assist and minimal visual, verbal and tactile cues related to their gait mechanics, foot clearance, assistive device utilization, and activity pacing.    Assistive Device: Walker luke, Gait belt  Ambulation - Level of Assistance: Contact guard assistance  Distance (ft): 400 Feet (ft)  Rail Use: Right   Interventions: Safety awareness training, Tactile cues, Verbal cues  Duration: 25 Minutes     Neuromuscular Re-education ( 14 Minutes):  Dynamic standing activity (hip flexion/extension/ab/adduction x10 with postural training and L LE muscle activation, sit to stand with facilitation to involve L UE for push of and eccentric control to sit as well asl L LE extension activation, and weightshifts L UE, to improve balance, coordination, posture and proprioception. Required minimal visual, verbal, manual and tactile cues to promote static and dynamic balance in standing. Braces/Orthotics/Lines/Etc:   · Sling to LUE  Treatment/Session Assessment:    · Response to Treatment:  See above  · Interdisciplinary Collaboration:   o Physical Therapist  o Registered Nurse  o [id number] [facility] chair follow  · After treatment position/precautions:   o Supine in bed  o Bed/Chair-wheels locked  o Bed in low position  o Call light within reach  o RN notified  o Side rails x 3   · Compliance with Program/Exercises: Compliant all of the time  · Recommendations/Intent for next treatment session: \"Next visit will focus on advancements to more challenging activities and reduction in assistance provided\".     Total Treatment Duration:  PT Patient Time In/Time Out  Time In: 1352  Time Out: [place], DPT

## 2020-05-06 LAB — MAGNESIUM SERPL-MCNC: 1.8 MG/DL (ref 1.8–2.4)

## 2020-05-06 PROCEDURE — 74011250637 HC RX REV CODE- 250/637: Performed by: HOSPITALIST

## 2020-05-06 PROCEDURE — 74011250637 HC RX REV CODE- 250/637: Performed by: NURSE PRACTITIONER

## 2020-05-06 PROCEDURE — 83735 ASSAY OF MAGNESIUM: CPT

## 2020-05-06 PROCEDURE — 97530 THERAPEUTIC ACTIVITIES: CPT

## 2020-05-06 PROCEDURE — 36415 COLL VENOUS BLD VENIPUNCTURE: CPT

## 2020-05-06 PROCEDURE — 74011250637 HC RX REV CODE- 250/637: Performed by: INTERNAL MEDICINE

## 2020-05-06 PROCEDURE — 65270000029 HC RM PRIVATE

## 2020-05-06 PROCEDURE — 74011250636 HC RX REV CODE- 250/636: Performed by: INTERNAL MEDICINE

## 2020-05-06 RX ADMIN — GUAIFENESIN 100 MG: 100 SOLUTION ORAL at 04:34

## 2020-05-06 RX ADMIN — DIPHENHYDRAMINE HYDROCHLORIDE 25 MG: 25 CAPSULE ORAL at 17:43

## 2020-05-06 RX ADMIN — ACETAMINOPHEN 650 MG: 325 TABLET, FILM COATED ORAL at 14:38

## 2020-05-06 RX ADMIN — DIPHENHYDRAMINE HYDROCHLORIDE 25 MG: 25 CAPSULE ORAL at 04:34

## 2020-05-06 RX ADMIN — Medication 400 MG: at 08:28

## 2020-05-06 RX ADMIN — METFORMIN HYDROCHLORIDE 500 MG: 500 TABLET ORAL at 08:28

## 2020-05-06 RX ADMIN — ACETAMINOPHEN 650 MG: 325 TABLET, FILM COATED ORAL at 04:34

## 2020-05-06 RX ADMIN — ATORVASTATIN CALCIUM 80 MG: 80 TABLET, FILM COATED ORAL at 21:47

## 2020-05-06 RX ADMIN — Medication 400 MG: at 17:43

## 2020-05-06 RX ADMIN — METFORMIN HYDROCHLORIDE 500 MG: 500 TABLET ORAL at 17:43

## 2020-05-06 RX ADMIN — METOPROLOL SUCCINATE 25 MG: 25 TABLET, FILM COATED, EXTENDED RELEASE ORAL at 08:28

## 2020-05-06 RX ADMIN — ASPIRIN 81 MG 81 MG: 81 TABLET ORAL at 08:28

## 2020-05-06 RX ADMIN — Medication 5 ML: at 14:39

## 2020-05-06 RX ADMIN — GUAIFENESIN 100 MG: 100 SOLUTION ORAL at 17:43

## 2020-05-06 RX ADMIN — LISINOPRIL 40 MG: 20 TABLET ORAL at 08:28

## 2020-05-06 RX ADMIN — ACETAMINOPHEN 650 MG: 325 TABLET, FILM COATED ORAL at 21:47

## 2020-05-06 RX ADMIN — ENOXAPARIN SODIUM 40 MG: 40 INJECTION SUBCUTANEOUS at 14:38

## 2020-05-06 NOTE — PROGRESS NOTES
Progress Note    2020  Admit Date: 2019  9:07 AM   NAME: Dio Pavon   :  1975   MRN:  [id number]   Attending: Ashleigh Cano MD  PCP:  Constantine Rocha MD  Treatment Team: Attending Provider: Ashleigh Cano MD; Care Manager: Mavis Torre [facility]; Utilization Review: Bonnie Ramos RN; Nurse Practitioner: Doris Humphrey NP; Care Manager: Fidelina Nj RN; Hospitalist: Keenan Holden NP; Nurse Practitioner: Sultana Bhagat, MARCELO; Physical Therapist: Linnie Curling, DPT; Charge Nurse: Margarita Rivera    Full Code   SUBJECTIVE:   Mr. Yvette Lindsey is a 54 yo male with PMH of DM, HTN who presented with c/o left sided weakness and left facial droop 19.   CT head showed age indeterminate infarctions within the left corona radiata/caudate.  CTA head/neck showed stenosis at the distal segment of the right vertebral artery and multifocal stenosis in the P2 segment of the left posterior cerebral artery. MRI brain showed acute to early subacute infarcts in the left cerebellar hemisphere, in the periventricular deep left frontoparietal white matter and likely additional areas of restricted diffusion in the right paramedian yariel.   Echo +PFO.  On statin, ASA.  Has been difficult to place in STR. Plans for 4 week event monitor on DC and if no arrhythmia PFO closure recommended. Pt remains stable today. Awaiting placement, medicaid pending. Denies complaints. Progressing well with PT.      Past Medical History:   Diagnosis Date    Acute ischemic stroke ([address] 2019    Acute pancreatitis 2014    Cerebrovascular accident (CVA) due to embolism ([address] 2019    Diabetes ([facility] 2002    Diabetes ([facility]     Diabetes mellitus     Hypertension      Recent Results (from the past 24 hour(s))   MAGNESIUM    Collection Time: 20  6:34 AM   Result Value Ref Range    Magnesium 1.8 1.8 - 2.4 mg/dL     No Known Allergies  Current Facility-Administered Medications   Medication Dose Route Frequency Provider Last Rate Last Dose    magnesium oxide (MAG-OX) tablet 400 mg  400 mg Oral BID [facility], NP   400 mg at 05/06/20 9877    metFORMIN (GLUCOPHAGE) tablet 500 mg  500 mg Oral BID WITH MEALS Donnie Brandon, NP   500 mg at 05/06/20 3656    acetaminophen (TYLENOL) tablet 650 mg  650 mg Oral Q4H PRN Terese Louise MD   650 mg at 05/04/20 6055    diphenhydrAMINE (BENADRYL) capsule 25 mg  25 mg Oral Q6H PRN Darrius Steward MD   25 mg at 05/06/20 0434    acetaminophen (TYLENOL) tablet 650 mg  650 mg Oral Mi Medina MD   650 mg at 05/06/20 0434    lip protectant (BLISTEX) ointment 1 Each  1 Each Topical PRN Tamy Rand MD        metoprolol succinate (TOPROL-XL) XL tablet 25 mg  25 mg Oral DAILY Terese Louise MD   25 mg at 05/06/20 7240    polyethylene glycol (MIRALAX) packet 17 g  17 g Oral DAILY PRN Ceferino TARIQ DO   17 g at 02/23/20 1708    guaiFENesin (ROBITUSSIN) 100 mg/5 mL oral liquid 100 mg  100 mg Oral Q4H PRN Terese Louise MD   100 mg at 05/06/20 0434    hydrALAZINE (APRESOLINE) 20 mg/mL injection 20 mg  20 mg IntraVENous Q4H PRN Eamon Gomez MD   20 mg at 04/17/20 0510    atorvastatin (LIPITOR) tablet 80 mg  80 mg Oral QHS Eamon Gomez MD   80 mg at 05/05/20 2122    lisinopril (PRINIVIL, ZESTRIL) tablet 40 mg  40 mg Oral DAILY Eamon Gomez MD   40 mg at 05/06/20 4530    sodium chloride (NS) flush 5-40 mL  5-40 mL IntraVENous PRN Eamon Gomez MD   10 mL at 05/01/20 0537    ondansetron (ZOFRAN) injection 4 mg  4 mg IntraVENous Q4H PRN Eamon Gomez MD        aspirin chewable tablet 81 mg  81 mg Oral DAILY Eamon Gomez MD   81 mg at 05/06/20 6406    enoxaparin (LOVENOX) injection 40 mg  40 mg SubCUTAneous Q24H Eamon Gomez MD   40 mg at 05/05/20 1311    dextrose 40% (GLUTOSE) oral gel 1 Tube  15 g Oral PRN Eamon Gomez MD        glucagon [facility]) injection 1 mg  1 mg IntraMUSCular PRN Sekou Joseph MD        dextrose (D50W) injection syrg 12.5-25 g  25-50 mL IntraVENous PRN Sekou Joseph MD           Review of Systems negative with exception of pertinent positives noted above  PHYSICAL EXAM     Visit Vitals  /90 (BP 1 Location: Right arm, BP Patient Position: At rest)   Pulse 70   Temp 97.5 °F (36.4 °C)   Resp 20   Ht 5' 6\" (1.676 m)   Wt 79.8 kg (175 lb 14.4 oz)   SpO2 97%   BMI 28.39 kg/m²      Temp (24hrs), Av.9 °F (36.6 °C), Min:97.5 °F (36.4 °C), Max:98.4 °F (36.9 °C)    Oxygen Therapy  O2 Sat (%): 97 % (20 0800)  Pulse via Oximetry: 75 beats per minute (20 0400)  O2 Device: Room air (20 1648)  No intake or output data in the 24 hours ending 20 1157       General:       No acute distress    Lungs:          CTA bilaterally. Resp even and nonlabored  Heart:            S1S2 present without murmurs rubs gallops. RRR. No LE edema  Abdomen:    Soft, non tender, non distended. BS present  Extremities: No cyanosis. Left sided hemiparesis  Neurologic:  moves right hand and raises arm at elbow moderately, unable to squeeze my fingers left hand, mildly slurred speech.  PERRLA, strength 4/5 RUE/RLE.     Results summary of Diagnostic Studies/Procedures copied from within [facility] EMR:      Edmar Dixon [address] Problems    Diagnosis Date Noted    Hypomagnesemia 2020    PFO (patent foramen ovale) 2019    Arrhythmia 12/15/2019    Hyperlipemia     Acute [facility]olic stroke ([facility]     Type 2 diabetes mellitus ([facility]     Hypertension      Plan:    Acute embolic stroke  MRI brain showed acute to early subacute infarcts in the left cerebellar hemisphere, in the periventricular deep left frontoparietal white matter and likely additional areas of restricted diffusion in the right paramedian yariel.    +PFO on echo  On ASA, statin  Will need 4 week event monitor on DC, if no arrhythmia then will need PFO closure      Hypomagnesemia   on oral supplementation  Check Mag in AM     Hypertension  Continue metoprolol and ACE inhibitor  Goal BP <130/80     Type 2 diabetes mellitus:    Monitor, BGL controlled   A1C 6.9      Medically stable for DC.  Awaiting Medicaid approval.         Notes, labs, VS, diagnostic testing reviewed  Time spent with pt 20 min           DVT Prophylaxis: lovenox     Plan of Care Discussed with: Supervising MD Dr. Siddiqui, care team, pt        Brittany Roy, MARCELO      Pt updates wife daily.  Does not need me to call per pt.

## 2020-05-06 NOTE — PROGRESS NOTES
's follow-up visit to offer spiritual and emotional support at staff's request. Mr. Ana Olsno welcomed the visit and I offered support due to patient's changes at home.      Dontrell Vidal 66  Board Certified

## 2020-05-06 NOTE — PROGRESS NOTES
Problem: Mobility Impaired (Adult and Pediatric)  Goal: *Acute Goals and Plan of Care  Description  GOALS UPDATED ON RE-ASSESSMENT 5/5/2020 (BOLD INDICATES UPDATED GOAL):  1. Patient will perform bed mobility with MODIFIED INDEPENDENCE and 0 verbal cues within 7 treatment days. 2. Patient will perform transfer bed to chair/wheelchair with MODIFIED INDEPENDENCE within 7 treatment days. 3. Patient will demonstrate FAIR+ dynamic balance throughout ambulation within 7 treatment days. 4. Patient demonstrate 3+/5 strength in L LE hip flexion, hip abduction, and hip adduction within 7 treatment days. 5. Patient will ambulate 300ft+ with STAND BY ASSIST and least retrictive assistive device and L LE AFO within 7 treatment days. 6. Patient will perform wheelchair mobility 75 ft with modified independence within 7 treatment days. 7. Patient will don/doff LUE sling for protection of L shoulder during transfers/gait with set up within 7 treatment days. 8. Patient will don/doff L LE AFO with MINIMAL ASSISTANCE within 7 treatment days. PHYSICAL THERAPY: Daily Note and PM 5/6/2020  INPATIENT: PT Visit Days : 2  Payor: [address] [place], [place] [address]y [address] / Plan: [address] [place] [address] / Product Type: Medicaid /       NAME/AGE/GENDER: Gifty Johnson is a 53 y.o. male   PRIMARY DIAGNOSIS: Acute ischemic stroke ([facility] [I63.9]  Cerebrovascular accident (CVA) due to embolism ([facility] [H09.1] Acute embolic stroke ([address] Acute embolic stroke ([facility]       ICD-10: Treatment Diagnosis:   · Difficulty in walking, Not elsewhere classified (R26.2)  · Hemiplegia and hemiparesis following cerebral infarction affecting left non-dominant side (J89.806)   Precaution/Allergies:  Patient has no known allergies. ASSESSMENT:     Mr. Henriquez is a 53year old admitted R MCA CVA. Patient seen this PM for physical therapy treatment session: present supine in bed, improve affect today, and motivated to participate.  Treatment initiated to include bed mobility, transfers, weight shifts, dynamic balance activity for toileting and clean up, handwashing at sink, gait training with luke walker, gait belt and CGA with obstacle management. Demonstrated a hemipalegic gait pattern on L LE with mild L LE circumduction. Improved mechanics on heel strike and stance phase appreciated with improved quad activation and less hyperextension through terminal stance. Also addressed dynamic standing balance  including standing hip ab/adduction, extension, and flexion and mini squats with L UE support on hallway railing to address dynamic standing balance and L LE proximal muscle activation patterns with cues. Patient continues to make steady progress toward acute PT goals. Continue to recommend inpatient rehabilitation stay; difficult discharge placement secondary to no payor. Will continued to follow with stated plan of care. Spoke with Whit at [facility] [phone]. Patient's L LE AFO should arrive tomorrow PM (5/7/2020). PT and orthotist will be present to fit, adjust, and gait training. Tennis shoes and high-top socks at bedside for AFO. This section established at most recent assessment   PROBLEM LIST (Impairments causing functional limitations):  1. Decreased Strength  2. Decreased ADL/Functional Activities  3. Decreased Transfer Abilities  4. Decreased Ambulation Ability/Technique  5. Decreased Balance  6. Increased Pain  7. Decreased Activity Tolerance  8. Increased Fatigue  9. Decreased Flexibility/Joint Mobility   INTERVENTIONS PLANNED: (Benefits and precautions of physical therapy have been discussed with the patient.)  1. Balance Exercise  2. Bed Mobility  3. Family Education  4. Gait Training  5. Home Exercise Program (HEP)  6. Manual Therapy  7. Neuromuscular Re-education/Strengthening  8. Range of Motion (ROM)  9. Therapeutic Activites  10. Therapeutic Exercise/Strengthening  11.  Transfer Training     TREATMENT PLAN: Frequency/Duration: 5 times a week for duration of hospital stay  Rehabilitation Potential For Stated Goals: Good     REHAB RECOMMENDATIONS (at time of discharge pending progress):    Placement: It is my opinion, based on this patient's performance to date, that Mr. Juvencio Saavedra may benefit from intensive therapy at an [address] [place] [address] after discharge due to a probable need for close medical supervision by a rehab physician, a probable need for multiple therapy disciplines and potential to make ongoing and sustainable functional improvement that is of practical value. .  Equipment:    TBD pending progress with therapy. HISTORY:   History of Present Injury/Illness (Reason for Referral):  Per H&P: \"Pt is a 54 y/o smoker with DM, HTN, who presented to ER with L leg and arm weakness, L facial droop, dysarthria. First noted L leg weakness late night 12/11 when he woke up to go to the bathroom. He was normal when he went to bed around 1030. Woke up this morning and had persistent weakness L leg and also now noted in L arm. EMS called. Noted to have slurred speech and L facial droop as well. Code S called in ER around 9am.  MRI with acute infarcts in L cerebellar hemisphere, deep frontoparietal white matter, and R paramedian yariel. Also noted old lacunar infarcts. No large vessel occlusion on CTA, but some stenosis noted. No hx afib, TIA, CVA. No CP, palpitations, SOB. \"  Past Medical History/Comorbidities:   Mr. Juvencio Saavedra  has a past medical history of Acute ischemic stroke ([address] (12/12/2019), Acute pancreatitis (11/19/2014), Cerebrovascular accident (CVA) due to embolism ([address] (12/12/2019), Diabetes ([address] (2002), Diabetes ([address] Diabetes mellitus, and Hypertension.  He also has no past medical history of Arthritis, Asthma, Autoimmune disease ([address] CAD (coronary artery disease), Cancer ([address] Chronic kidney disease, COPD, Dementia, Dementia ([address] Heart failure ([address] Ill-defined condition, Infectious disease, Liver disease, Other ill-defined conditions(799.89), Psychiatric disorder, PUD (peptic ulcer disease), Seizures ([address] or Sleep disorder. Mr. Kathleen Jensen  has a past surgical history that includes hx hernia repair and hx orthopaedic. Social History/Living Environment:   Home Environment: Apartment  # Steps to Enter: 12  Rails to Enter: Yes  [employer] : Bilateral  One/Two Story Residence: One story  Living Alone: No  Support Systems: Spouse/Significant Other/Partner  Patient Expects to be Discharged to[de-identified] Unknown  Current DME Used/Available at Home: None  Tub or Shower Type: Tub/Shower combination  Prior Level of Function/Work/Activity:  Independent, lives with wife in 2nd story 1 level apartment. No recent falls. Number of Personal Factors/Comorbidities that affect the Plan of Care: 1-2: MODERATE COMPLEXITY   EXAMINATION:   Most Recent Physical Functioning:   Gross Assessment: left hip grossly 2/5 to 3-/5  AROM: Generally decreased, functional(L UE/LE)  PROM: Within functional limits  Strength: Generally decreased, functional(L UE/LE)  Coordination: Generally decreased, functional(L UE/LE)  Tone: Abnormal(L UE; L LE)  Sensation: Intact(light touch B UE/LE)                    Balance:  Sitting: Impaired  Sitting - Static: Good (unsupported)  Sitting - Dynamic: Fair (occasional)  Standing: Impaired  Standing - Static: Good  Standing - Dynamic : Fair Bed Mobility:  Supine to Sit: Stand-by assistance; Additional time  Sit to Supine: Stand-by assistance  Scooting: Contact guard assistance  Wheelchair Mobility: NT     Transfers:  Sit to Stand: Stand-by assistance  Stand to Sit: Stand-by assistance  Bed to Chair: Contact guard assistance  Interventions: Safety awareness training; Tactile cues; Verbal cues  Gait:     Base of Support: Center of gravity altered  Gait Abnormalities: Hemiplegic;Circumduction  Distance (ft): 400 Feet (ft)  Assistive Device: Walker luke;Gait belt  Ambulation - Level of Assistance: Contact guard assistance      Body Structures Involved:  1. Nerves  2. Voice/Speech  3. Bones  4. Joints  5. Muscles Body Functions Affected:  1. Mental  2. Sensory/Pain  3. Neuromusculoskeletal  4. Movement Related Activities and Participation Affected:  1. General Tasks and Demands  2. Mobility  3. Self Care  4. Interpersonal Interactions and Relationships   Number of elements that affect the Plan of Care: 4+: HIGH COMPLEXITY   CLINICAL PRESENTATION:   Presentation: Evolving clinical presentation with changing clinical characteristics: MODERATE COMPLEXITY   CLINICAL DECISION MAKIN [facility] Inpatient Short Form  How much difficulty does the patient currently have. .. Unable A Lot A Little None   1. Turning over in bed (including adjusting bedclothes, sheets and blankets)? [] 1   [] 2   [] 3   [x] 4   2. Sitting down on and standing up from a chair with arms ( e.g., wheelchair, bedside commode, etc.)   [] 1   [] 2   [x] 3   [] 4   3. Moving from lying on back to sitting on the side of the bed? [] 1   [] 2   [] 3   [x] 4   How much help from another person does the patient currently need. .. Total A Lot A Little None   4. Moving to and from a bed to a chair (including a wheelchair)? [] 1   [] 2   [x] 3   [] 4   5. Need to walk in hospital room? [] 1   [] 2   [x] 3   [] 4   6. Climbing 3-5 steps with a railing? [] 1   [x] 2   [] 3   [] 4   © , Trustees of [address] [place], [place] [address], under license to [email or web address]. All rights reserved      Score:  Initial: 13 Most Recent: 19 (Date: 2020)    Interpretation of Tool:  Represents activities that are increasingly more difficult (i.e. Bed mobility, Transfers, Gait). Medical Necessity:     · Patient is expected to demonstrate progress in   · strength, range of motion, balance, coordination, and functional technique  ·  to   · increase independence with all mobility.    · .  Reason for Services/Other Comments:  · Patient continues to require skilled intervention due to   · medical complications and mobility deficits which impact his level of function, safety, and independence as indicated above. · .   Use of outcome tool(s) and clinical judgement create a POC that gives a: Questionable prediction of patient's progress: MODERATE COMPLEXITY        TREATMENT:      Pre-treatment Symptoms/Complaints: see above  Pain: Initial: 0/10 Post Session:  0/10       Therapeutic Activity (  40 Minutes): Therapeutic activities including bed mobility, dynamic sitting balance activity, transfer training, dynamic standing balance activity throughout clean up, ambulation on level ground with luke walker; and standing dynamic balance activities at hallway railing (hip flexion, ab/adduction, extension, and mini squats  to improve mobility, strength, balance and coordination. Required minimal visual, verbal and tactile cues to promote dynamic balance in standing. Braces/Orthotics/Lines/Etc:   · Sling to LUE  Treatment/Session Assessment:    · Response to Treatment:  See above  · Interdisciplinary Collaboration:   o Physical Therapist  o Registered Nurse  o [id number] [facility] chair follow  · After treatment position/precautions:   o Up in [facility] per patient request; patient did not want chair alarm placed; RN at bedside to transfer patient back to bed; needs met; patient in NAD   · Compliance with Program/Exercises: Compliant all of the time  · Recommendations/Intent for next treatment session: \"Next visit will focus on advancements to more challenging activities and reduction in assistance provided\".     Total Treatment Duration:  PT Patient Time In/Time Out  Time In: 1435  Time Out: [address], DPT

## 2020-05-07 PROCEDURE — 65270000029 HC RM PRIVATE

## 2020-05-07 PROCEDURE — 97112 NEUROMUSCULAR REEDUCATION: CPT

## 2020-05-07 PROCEDURE — 74011250637 HC RX REV CODE- 250/637: Performed by: INTERNAL MEDICINE

## 2020-05-07 PROCEDURE — 74011250637 HC RX REV CODE- 250/637: Performed by: HOSPITALIST

## 2020-05-07 PROCEDURE — 74011250637 HC RX REV CODE- 250/637: Performed by: NURSE PRACTITIONER

## 2020-05-07 PROCEDURE — 97116 GAIT TRAINING THERAPY: CPT

## 2020-05-07 PROCEDURE — 74011250636 HC RX REV CODE- 250/636: Performed by: INTERNAL MEDICINE

## 2020-05-07 RX ADMIN — Medication 400 MG: at 17:13

## 2020-05-07 RX ADMIN — METOPROLOL SUCCINATE 25 MG: 25 TABLET, FILM COATED, EXTENDED RELEASE ORAL at 08:08

## 2020-05-07 RX ADMIN — ACETAMINOPHEN 650 MG: 325 TABLET, FILM COATED ORAL at 22:15

## 2020-05-07 RX ADMIN — GUAIFENESIN 100 MG: 100 SOLUTION ORAL at 17:13

## 2020-05-07 RX ADMIN — ENOXAPARIN SODIUM 40 MG: 40 INJECTION SUBCUTANEOUS at 15:08

## 2020-05-07 RX ADMIN — LISINOPRIL 40 MG: 20 TABLET ORAL at 08:08

## 2020-05-07 RX ADMIN — Medication 10 ML: at 22:15

## 2020-05-07 RX ADMIN — DIPHENHYDRAMINE HYDROCHLORIDE 25 MG: 25 CAPSULE ORAL at 17:13

## 2020-05-07 RX ADMIN — ACETAMINOPHEN 650 MG: 325 TABLET, FILM COATED ORAL at 05:22

## 2020-05-07 RX ADMIN — ATORVASTATIN CALCIUM 80 MG: 80 TABLET, FILM COATED ORAL at 22:15

## 2020-05-07 RX ADMIN — METFORMIN HYDROCHLORIDE 500 MG: 500 TABLET ORAL at 17:13

## 2020-05-07 RX ADMIN — ASPIRIN 81 MG 81 MG: 81 TABLET ORAL at 08:08

## 2020-05-07 RX ADMIN — ACETAMINOPHEN 650 MG: 325 TABLET, FILM COATED ORAL at 15:08

## 2020-05-07 RX ADMIN — METFORMIN HYDROCHLORIDE 500 MG: 500 TABLET ORAL at 08:08

## 2020-05-07 RX ADMIN — Medication 400 MG: at 08:08

## 2020-05-07 NOTE — PROGRESS NOTES
Hospitalist Progress Note    Subjective:   Daily Progress Note: 5/7/2020 10:32 AM    Patient admitted 12/12/19 with acute right side embolic stroke with left side residual weakness and left facial droop. CT brain on admission with indeterminate infarctions within the left corona radiata/caudate.  CTA of the head and neck with stenosis at the distal segment of the right  Vertebral artery and multifocal stenosis in the P2 segment of the left posterior cerebral artery.  MRI brain with acute to early subacute infarcts in the left cerebellar hemisphere in the periventricular deep left frontoparietal white matter and likely additional areas of restricted diffusion in the right paramedian yariel. Echo with + PFO, on statin and ASA.  Will need event monitor on discharge and if no arrhythmia, PFO closure recommended.  Wife informs CM she does not want patient at home, they were planning to separate prior to this stroke.  CM attempting  to place in STR, medicaid pending.    4/9:  Speech remains slightly slurred, SLP signing off as clinical indication no longer needed.    4/10: Mag critically low today: 1.3. Replaced   4/14:  Continues to wait for placement.  Good participation with PT/OT. Dmitriy Rout a little use of right hand, still unable to .    5/2:  Fitted for AFO today per orthotist.  No complaints.  Continued wait for medicaid and SSI approval.  Metformin dosing adjusted a few days ago.  Glucose 118-126 x 24 hours. CM spoke with wife, still does not want patient to come home, even if he has to go to a shelter. 5/4:  DM management in for teaching and medication recommendations. Patient attempting to use left hand for glucometer, remains weak. 5/7:  Sitting up in bed, conversant. No complaints. Continues to wait for placement. Needs 4 week event monitor on discharge. Continues to participate with PT.       Current Facility-Administered Medications   Medication Dose Route Frequency    magnesium oxide (MAG-OX) tablet 400 mg  400 mg Oral BID    metFORMIN (GLUCOPHAGE) tablet 500 mg  500 mg Oral BID WITH MEALS    acetaminophen (TYLENOL) tablet 650 mg  650 mg Oral Q4H PRN    diphenhydrAMINE (BENADRYL) capsule 25 mg  25 mg Oral Q6H PRN    acetaminophen (TYLENOL) tablet 650 mg  650 mg Oral Q8H    lip protectant (BLISTEX) ointment 1 Each  1 Each Topical PRN    metoprolol succinate (TOPROL-XL) XL tablet 25 mg  25 mg Oral DAILY    polyethylene glycol (MIRALAX) packet 17 g  17 g Oral DAILY PRN    guaiFENesin (ROBITUSSIN) 100 mg/5 mL oral liquid 100 mg  100 mg Oral Q4H PRN    hydrALAZINE (APRESOLINE) 20 mg/mL injection 20 mg  20 mg IntraVENous Q4H PRN    atorvastatin (LIPITOR) tablet 80 mg  80 mg Oral QHS    lisinopril (PRINIVIL, ZESTRIL) tablet 40 mg  40 mg Oral DAILY    sodium chloride (NS) flush 5-40 mL  5-40 mL IntraVENous PRN    ondansetron (ZOFRAN) injection 4 mg  4 mg IntraVENous Q4H PRN    aspirin chewable tablet 81 mg  81 mg Oral DAILY    enoxaparin (LOVENOX) injection 40 mg  40 mg SubCUTAneous Q24H    dextrose 40% (GLUTOSE) oral gel 1 Tube  15 g Oral PRN    glucagon (GLUCAGEN) injection 1 mg  1 mg IntraMUSCular PRN    dextrose (D50W) injection syrg 12.5-25 g  25-50 mL IntraVENous PRN      Review of Systems  A comprehensive review of systems was negative except for that written in the HPI. Objective:     Visit Vitals  /86 (BP 1 Location: Right arm, BP Patient Position: At rest)   Pulse 76   Temp 97.7 °F (36.5 °C)   Resp 18   Ht 5' 6\" (1.676 m)   Wt 79.8 kg (175 lb 14.4 oz)   SpO2 96%   BMI 28.39 kg/m²      O2 Device: Room air    Temp (24hrs), Av °F (36.7 °C), Min:97.5 °F (36.4 °C), Max:98.7 °F (37.1 °C)     1901 -  0700  In: 600 [P.O.:600]  Out: -     General appearance: Oriented and alert, cooperative, denies need or new issues.  In good spirits.    Head: Normocephalic, without obvious abnormality, atraumatic  Eyes: conjunctivae/corneas clear.  PERRL  Throat: Continued mild left facial weakness.  Lips, mucosa, and tongue normal. Teeth and gums normal  Neck: supple, symmetrical, trachea midline, no JVD  Lungs: clear to auscultation bilaterally  Heart: regular rate and rhythm, S1, S2 normal, no murmur, click, rub or gallop  Abdomen: soft, non-tender. Bowel sounds normal. No masses,  no organomegaly  Extremities: Persistent left side upper and lower residual weakness, improved since admission. Skin: Skin color, texture, turgor normal. No rashes or lesions  Neurologic: As above.     Additional comments: Notes,orders, test results, vitals reviewed    Data Review:  Last labs:    Ref. Range 5/4/2020 04:08 5/6/2020 06:34   WBC Latest Ref Range: 4.3 - 11.1 K/uL 5.9    RBC Latest Ref Range: 4.23 - 5.6 M/uL 4.06 (L)    HGB Latest Ref Range: 13.6 - 17.2 g/dL 11.0 (L)    HCT Latest Ref Range: 41.1 - 50.3 % 34.2 (L)    MCV Latest Ref Range: 79.6 - 97.8 FL 84.2    MCH Latest Ref Range: 26.1 - 32.9 PG 27.1    MCHC Latest Ref Range: 31.4 - 35.0 g/dL 32.2    RDW Latest Ref Range: 11.9 - 14.6 % 16.2 (H)    PLATELET Latest Ref Range: 150 - 450 K/uL 187    MPV Latest Ref Range: 9.4 - 12.3 FL 11.9    NEUTROPHILS Latest Ref Range: 43 - 78 % 50    LYMPHOCYTES Latest Ref Range: 13 - 44 % 43    MONOCYTES Latest Ref Range: 4.0 - 12.0 % 6    EOSINOPHILS Latest Ref Range: 0.5 - 7.8 % 1    BASOPHILS Latest Ref Range: 0.0 - 2.0 % 0    IMMATURE GRANULOCYTES Latest Ref Range: 0.0 - 5.0 % 0    DF Latest Units:   AUTOMATED    ABSOLUTE NRBC Latest Ref Range: 0.0 - 0.2 K/uL 0.00    ABS. NEUTROPHILS Latest Ref Range: 1.7 - 8.2 K/UL 2.9    ABS. IMM. GRANS. Latest Ref Range: 0.0 - 0.5 K/UL 0.0    ABS. LYMPHOCYTES Latest Ref Range: 0.5 - 4.6 K/UL 2.5    ABS. MONOCYTES Latest Ref Range: 0.1 - 1.3 K/UL 0.3    ABS. EOSINOPHILS Latest Ref Range: 0.0 - 0.8 K/UL 0.1    ABS.  BASOPHILS Latest Ref Range: 0.0 - 0.2 K/UL 0.0    Sodium Latest Ref Range: 136 - 145 mmol/L 141    Potassium Latest Ref Range: 3.5 - 5.1 mmol/L 4.2    Chloride Latest Ref Range: 98 - 107 mmol/L 108 (H)    CO2 Latest Ref Range: 21 - 32 mmol/L 25    Anion gap Latest Ref Range: 7 - 16 mmol/L 8    Glucose Latest Ref Range: 65 - 100 mg/dL 95    BUN Latest Ref Range: 6 - 23 MG/DL 18    Creatinine Latest Ref Range: 0.8 - 1.5 MG/DL 0.96    Calcium Latest Ref Range: 8.3 - 10.4 MG/DL 9.5    Magnesium Latest Ref Range: 1.8 - 2.4 mg/dL  1.8   GFR est non-AA Latest Ref Range: >60 ml/min/1.73m2 >60    GFR est AA Latest Ref Range: >60 ml/min/1.73m2 >60    Bilirubin, total Latest Ref Range: 0.2 - 1.1 MG/DL 0.4    Protein, total Latest Ref Range: 6.3 - 8.2 g/dL 6.5    Albumin Latest Ref Range: 3.5 - 5.0 g/dL 2.7 (L)    Globulin Latest Ref Range: 2.3 - 3.5 g/dL 3.8 (H)    A-G Ratio Latest Ref Range: 1.2 - 3.5   0.7 (L)    ALT (SGPT) Latest Ref Range: 12 - 65 U/L 18    AST Latest Ref Range: 15 - 37 U/L 16    Alk. phosphatase Latest Ref Range: 50 - 136 U/L 68       12/12/19:  CT HEAD:  Age indeterminate infarctions within the left corona radiata/caudate. Mild chronic small vessel ischemic changes and areas of remote infarction as described.  Probable partial volume averaging the region of the left thalamus rather than intraparenchymal hemorrhage.     12/12/19:  CTA HEAD AND NECK:      Stenosis at the distal segment of the right vertebral artery and multifocal  stenosis in the P2 segment of the left posterior cerebral artery.     12/12/19:  MRI BRAIN:  Acute to early subacute infarcts in the left cerebellar hemisphere, in the periventricular deep left frontoparietal white matter and likely additional areas of restricted diffusion in the right paramedian yariel. Old lacunar infarcts in the corpus striatum and periventricular white matter as well as within the left corona radiata.     1/8/20:  SWALLOW FUNCTION VIDEO: Small quantity aspiration with straw sips of thin liquids.        Assessment/Plan:   Acute to early subacute infarcts in the left cerebellar hemisphere, in the periventricular deep left frontoparietal white matter and likely additional areas of restricted diffusion in the right paramedian yariel.  Old lacunar infarcts also.                  Making good progress with PT/OT                Speech and swallowing improved to the extent SLP  signed off                 Pending placement after approved for SSI and medicaid     Dysarthria due to stroke:  Improved immensely              SLP releasing 4/14      Difficult placement with marital discord prior to stroke:  Wife does not want him at home:  CM working on tirelessly              Now issues obtaining MR for [employer]               CM spoke with wife 5/1, she still does not want patient to come home.      Hypertension:  Continue lisinopril, toprol XL     IDDM II:  Continue glucophage, diabetic diet.  A1C: 8.4, rechecking               adding SSI 4/15              DM education and management continues to follow intermittently,  increased glucophage frequency 4/30   Discontinue SSI      Arrhythmia:  On beta blocker     PFO 12/17/2019:                Will need 4 week event monitor on discharge               Will need PFO closure at some point after discharge per Cards      Hypomagnesemia:  Replace and recheck               Increasing daily 400 mg dose to bid 5/2.              Recheck mag 5/5/20: 1.8       Plan discussed with Maxim EDWARDS 94 discussed with: Patient and Nurse    Signed By: Samira Harris NP     May 7, 2020

## 2020-05-07 NOTE — PROGRESS NOTES
Problem: Self Care Deficits Care Plan (Adult)  Goal: *Acute Goals and Plan of Care (Insert Text)  Description  Goals per Re-evaluation on 3/18/2020:   1. Patient will demonstrate appropriate safety awareness and protection of L UE during bed mobility and functional transfers with minimal cues. (Progressing, still needs cues, 4/14/20)  2. Patient will complete total body bathing and dressing with Min A and adaptive equipment as needed. (Progressing 4/14/20  3. Patient will complete weightbearing into the L UE with ADL tasks with minimal assistance to improve ability to use as a functional assist during ADL tasks. (Progressing 4/14/20)4. Patient will demonstrate L UE SROM HEP within 7 days. (Progressing, 3/18/2020)  5. Pt will complete bed mobility with Mod I and minimal verbal cueing (Progressing, Supervision, 4/14/20). 6. Pt will complete dynamic sitting balance for ADLs at mod I with good balance (Progressing, 4/14/20  7. Pt will complete functional transfers with Supervision with equipment as needed. (Progressing, SBA to CGA 4/14/20)  8. Pt will complete grooming tasks in standing at sink level x5 mins with good balance and equipment as needed MET  9. Pt will complete grooming standing at sink level with SBA and use of adaptive equipment as needed. MET  10. Pt will don/doff UE sling with SBA and use of minimal verbal and visual cueing for correct application (Progressing, Min A 4/14/20. Goals below remain appropriate as of 4/24/2020:  1. Patient will demonstrate appropriate safety awareness and protection of L UE during bed mobility and functional transfers with no verbal cues. CONTINUE   2. Patient will complete lower body bathing and dressing with Min A and adaptive equipment as needed. CONTINUE  3. Patient will complete upper body bathing and dressing with SBA and adaptive equipment as needed.  CONTINUE  4. Patient will complete weightbearing into the L UE with ADL tasks with minimal assistance to improve ability to use as a functional assist during ADL tasks. CONTINUE  5. Patient will demonstrate L UE SROM HEP within 7 days. CONTINUE  6. Pt will complete bed mobility with Mod I and no verbal cueing. CONTINUE  7. Pt will complete functional transfers with Supervision with equipment as needed. CONTINUE  8. Pt will don/doff UE sling with Mod I and no verbal cueing. CONTINUE  9. Pt will complete toileting with SBA for toilet transfer and gown management. CONTINUE  10. Patient will participate in using L UE as a functional assist for ADL for 15 minutes with minimal facilitation. CONTINUE  11. Patient will complete sit to stand at midline with minimal assistance and cueing. CONTINUE  12. Patient will complete therapeutic exercises with L UE with minimal tactile cues for facilitation of the LUE. CONTINUE    Outcome: Progressing Towards Goal     OCCUPATIONAL THERAPY: Daily Note and PM    5/7/2020  INPATIENT: OT Visit Days: 6  Payor: [id number] / Plan: [place] / Product Type: Medicaid /      NAME/AGE/GENDER: Alanna Boss is a 53 y.o. male   PRIMARY DIAGNOSIS:  Acute ischemic stroke ([address] [I63.9]  Cerebrovascular accident (CVA) due to embolism ([address] [W05.5] Acute embolic stroke ([address] Acute embolic stroke ([address]       ICD-10: Treatment Diagnosis:    · Generalized Muscle Weakness (M62.81)  · Other lack of cordination (R27.8)  · Hemiplegia and hemiparesis following cerebral infarction affecting   · left non-dominant side (I69.354)  · Abnormal posture (R29.3)   Precautions/Allergies:    NO PULLING ON LUE   LUE in sling with shoulder joint approximated and supported, needs taping   Patient has no known allergies. ASSESSMENT:     Mr. Kyung Cisse presents to the hospital with L sided weakness and acute ischemic CVA. MRI revealed acute to subacute L cerebellar and R paramedian yariel infarcts. 5/7/2020: Pt supine in bed upon arrival. Pt is agreeable to activity sitting edge of bed.  Pt worked on activities for improving ROM and increased awareness of L UE during activity. Pt needs cues to attempt to move L UE with activity (automatically just lifts it with his R UE each time). Pt with increased stiffness in the fingers with pain with tendon glides. Pt has poor activation in the L UE and needs maximal facilitation for proximal stabilization and guiding L arm with activity. Pt returned back to supine once dinner arrived stating he is tired. Pt to continue per plan of care. This section established at most recent assessment   PROBLEM LIST (Impairments causing functional limitations):  1. Decreased Strength  2. Decreased ADL/Functional Activities  3. Decreased Transfer Abilities  4. Decreased Ambulation Ability/Technique  5. Decreased Balance  6. Decreased Activity Tolerance  7. Increased Fatigue  8. Decreased Flexibility/Joint Mobility  9. Decreased Knowledge of Precautions  10. Decreased [place] with Home Exercise Program   INTERVENTIONS PLANNED: (Benefits and precautions of occupational therapy have been discussed with the patient.)  1. Activities of daily living training  2. Adaptive equipment training  3. Balance training  4. Clothing management  5. Cognitive training  6. Donning&doffing training  7. Keanu tech training  8. Neuromuscular re-eduation  9. Therapeutic activity  10. Therapeutic exercise     TREATMENT PLAN: Frequency/Duration: Follow patient 3 times per week to address above goals. Rehabilitation Potential For Stated Goals: Excellent     REHAB RECOMMENDATIONS (at time of discharge pending progress):    Placement: It is my opinion, based on this patient's performance to date, that Mr. Aristeo Rascon may benefit from intensive therapy at an [address] [place] [address] after discharge due to potential to make ongoing and sustainable functional improvement that is of practical value. Yannick Angry Pt functioning far below independent baseline, demonstrating good improvement and participation. Pt would likely benefit greatly and increase independence from inpatient rehab stay. Equipment:    TBD               OCCUPATIONAL PROFILE AND HISTORY:   History of Present Injury/Illness (Reason for Referral):  See H&P  Past Medical History/Comorbidities:   Mr. Wendy Fischer  has a past medical history of Acute ischemic stroke ([address] (12/12/2019), Acute pancreatitis (11/19/2014), Cerebrovascular accident (CVA) due to embolism ([address] (12/12/2019), Diabetes ([address] (2002), Diabetes ([address] Diabetes mellitus, and Hypertension. He also has no past medical history of Arthritis, Asthma, Autoimmune disease ([address] CAD (coronary artery disease), Cancer ([address] Chronic kidney disease, COPD, Dementia, Dementia ([address] Heart failure ([address] Ill-defined condition, Infectious disease, Liver disease, Other ill-defined conditions(799.89), Psychiatric disorder, PUD (peptic ulcer disease), Seizures ([address] or Sleep disorder. Mr. Wendy Fischer  has a past surgical history that includes hx hernia repair and hx orthopaedic. Social History/Living Environment:   Home Environment: Apartment  # Steps to Enter: 12  Rails to Enter: Yes  [employer] : Bilateral  One/Two Story Residence: One story  Living Alone: No  Support Systems: Spouse/Significant Other/Partner  Patient Expects to be Discharged to[de-identified] Unknown  Current DME Used/Available at Home: None  Tub or Shower Type: Tub/Shower combination  Prior Level of Function/Work/Activity:  Pt lives at home with his wife. Pt is typically independent with ADL/functional mobility. Pt does not drive. Pt was working part-time at [employer]. Personal Factors:          Age:  53 y.o.         Past/Current Experience:  CVA with flaccid L side        Other factors that influence how disability is experienced by the patient:  multiple co-morbidities    Number of Personal Factors/Comorbidities that affect the Plan of Care: Extensive review of physical, cognitive, and psychosocial performance (3+):  HIGH COMPLEXITY   ASSESSMENT OF OCCUPATIONAL PERFORMANCE[de-identified]   Activities of Daily Living:   Basic ADLs (From Assessment) Complex ADLs (From Assessment)   Feeding: Setup  Oral Facial Hygiene/Grooming: Minimum assistance  Bathing: Minimum assistance, Moderate assistance  Upper Body Dressing: Minimum assistance  Lower Body Dressing: Moderate assistance  Toileting: Minimum assistance Instrumental ADL  Meal Preparation: Total assistance  Homemaking: Total assistance  Medication Management: Total assistance  Financial Management: Total assistance   Grooming/Bathing/Dressing Activities of Daily Living     Cognitive Retraining  Safety/Judgement: Fall prevention   Upper Body Bathing  Bathing Assistance: Min A   Position Performed: Seated in chair                  Lower Body Dressing Assistance  Socks: Total assistance (dependent) Bed/Mat Mobility  Rolling: Stand-by assistance  Supine to Sit: Contact guard assistance  Sit to Supine: Stand-by assistance  Sit to Stand: Contact guard assistance  Stand to Sit: Stand-by assistance  Bed to Chair: Stand-by assistance  Scooting: Contact guard assistance     Most Recent Physical Functioning:   Gross Assessment:  AROM: (limited AROM in the L UE)  PROM: Generally decreased, functional(L UE)  Strength: (grossly 2-/5 to trace movement in L UE)  Coordination: (non-functional in L UE)  Tone: Abnormal(hypotonic in L UE)  Sensation: Intact(reports intact grossly to light touch)               Posture:     Balance:  Sitting: Impaired  Sitting - Static: Good (unsupported)  Sitting - Dynamic: Fair (occasional)  Standing: Impaired  Standing - Static: Good  Standing - Dynamic : Fair Bed Mobility:  Rolling: Stand-by assistance  Supine to Sit: Contact guard assistance  Sit to Supine: Stand-by assistance  Scooting: Contact guard assistance  Interventions: Safety awareness training; Tactile cues; Verbal cues  Wheelchair Mobility:     Transfers:  Sit to Stand: Contact guard assistance  Stand to Sit: Stand-by assistance  Bed to Chair: Stand-by assistance  Interventions: Safety awareness training; Tactile cues; Verbal cues            Patient Vitals for the past 6 hrs:   BP BP Patient Position SpO2 Pulse   20 1200 142/90 At rest 99 % 80   20 1600 138/85 At rest 97 % 76       Mental Status  Neurologic State: Alert  Orientation Level: Oriented X4  Cognition: Follows commands  Perception: Cues to attend to left side of body  Perseveration: No perseveration noted  Safety/Judgement: Fall prevention                          Physical Skills Involved:  1. Range of Motion  2. Balance  3. Strength  4. Activity Tolerance  5. Fine Motor Control  6. Gross Motor Control Cognitive Skills Affected (resulting in the inability to perform in a timely and safe manner):  1. Perception  2. Expression Psychosocial Skills Affected:  1. Habits/Routines  2. Environmental Adaptation  3. Social Interaction  4. Emotional Regulation  5. Self-Awareness  6. Awareness of Others  7. Social Roles   Number of elements that affect the Plan of Care: 5+:  HIGH COMPLEXITY   CLINICAL DECISION MAKIN[address] [place] [id number] AM-PAC 6 Clicks   Daily Activity Inpatient Short Form  How much help from another person does the patient currently need. .. Total A Lot A Little None   1. Putting on and taking off regular lower body clothing? [] 1   [x] 2   [] 3   [] 4   2. Bathing (including washing, rinsing, drying)? [] 1   [] 2   [x] 3   [] 4   3. Toileting, which includes using toilet, bedpan or urinal?   [] 1   [] 2   [x] 3   [] 4   4. Putting on and taking off regular upper body clothing? [] 1   [] 2   [x] 3   [] 4   5. Taking care of personal grooming such as brushing teeth? [] 1   [] 2   [x] 3   [] 4   6. Eating meals? [] 1   [] 2   [x] 3   [] 4   © , Trustees of [address] [place] [address] [address] [id number], under license to [employer].  All rights reserved      Score:  Initial: 11 Most Recent: 17 (20)    Interpretation of Tool:  Represents activities that are increasingly more difficult (i.e. Bed mobility, Transfers, Gait). Medical Necessity:     · Patient demonstrates   · good and excellent  ·  rehab potential due to higher previous functional level. Reason for Services/Other Comments:  · Patient continues to require skilled intervention due to   · Decreased independence with ADL/functional transfers that impacts overall quality of life. · .   Use of outcome tool(s) and clinical judgement create a POC that gives a: MODERATE COMPLEXITY         TREATMENT:   (In addition to Assessment/Re-Assessment sessions the following treatments were rendered)     Pre-treatment Symptoms/Complaints: Pt reports no pain in L hand which he has been experiencing in prior sessions. Pain: Initial:   Pain Intensity 1: 0 Post Session: Unchanged     Neuromuscular Re-education ( 24): Focus on session on left UE function, PROM, self ROM, tendon glides. Left tendons tight and painful but still soft end feel with wrist extension/flexion and digit grasp. . Push and pull exercises with writer providing resistance at distal left UE as well. Making progress. Date:   4/22/2020 Date:  4/24/2020 Date:  4/27/2020 Date:   5/7/20   Activity/Exercise Parameters Parameters Parameters    Shoulder flexion SROM/AAROM       Elbow flexion/extension SROM/AAROM       Forearm supination/pronation        Wrist extension     20 reps AROM (partial ROM);  Attempted isometric extension but patient not able to hold position in extension    Finger flexion/extension     AAROM for tendon gliding (reports pain with flexion); 15-20 reps    Forward Reaching with soccer ball 20 reps (using two handed technique with R hand over left; therapist supporting L elbow) 20 reps (using two handed technique with R hand over left; therapist supporting L elbow) 25 reps (using two handed technique with R hand over left; therapist supporting L elbow) 20 reps using two handed technique and pushing forward for protraction/forward reaching (using washcloth)   Crossing Midline with soccer ball    20 reps each way (using two handed technique with R hand over left; therapist supporting L elbow) Pt completed with for 20 reps with support at the elbow and maximal facilitation from therapist to moving UE (using washcloth)   Shoulder Horizontal Abduction & Adduction with soccer ball 20 reps each way (using two handed technique with R hand over left; therapist supporting L elbow) 20 reps each way (using two handed technique with R hand over left; therapist supporting L elbow)     Shoulder Flexion & Crossing Midline with cones 14 reps (pt stabilized L wrist; therapist supporting L elbow) 14 reps (pt stabilized L wrist; therapist supporting L elbow) 14 reps (pt stabilized L wrist; therapist supporting L elbow) 5 reps with maximal assistance to support at the elbow and wrist   Digit Flexion & Extension with pegs 24 reps (using two handed technique with assist needed for object release) 24 reps (using two handed technique with decreased assist needed for object release) 24 reps (using two handed technique with decreased assist needed for object release)    1st CMC Flexion/Extension  10 reps (using yellow putty with Total A from therapist to complete) 15 reps (using yellow putty with Total A from therapist to complete)                        Date:  4/14/20  Date:  4/15/20 Date:   4/16/20 Date:  5/7/20   Activity/Exercise Parameters Parameters     Midline orientation sit to stand  Moderate facilitation at the L LE and for decreased rotation at the trunk      Midline sit to squat  Min to mod A w/ hands in his lap       Weightbearing into L side standing at sink        Forward reach with cane 10 reps with moderate facilitation at the elbow/scapula 15 reps with moderate facilitation at the elbow/scapula into protraction  15 reps with moderate facilitation at the elbow/scapula into protraction 25 reps with moderate facilitation at the elbow/scapula  (cane on the floor and pt pushing forward)   External rotation with cane 10 reps with minimal facilitation  15 reps with SBA/CGA  15 reps with SBA/CGA 25 reps with minimal facilitation (cane on the floor and elbow at his side)   Posture  Upright sitting/standing with verbal/visual cueing  Pt encouraged for upright posture with moderate cues throughout session  Pt encouraged for upright posture with moderate cues throughout session     Weightbearing into elbow  10 reps with moderate assistance pushing up into midline  2 sets with prolonged weight bearing and reaching to the L of midline for 2 sets x 15 reps  Sitting at the table with pt encouraged for propped elbows and weightbearing through the L with moderate cues     Weightbearing into hand Mod to maximal assistance with facilitation at the elbow; 2 sets x 10 reps       Elbow flexion 10 reps AAROM; 10 reps holding ball in B hands       Grasp release of washcloth  10 reps with moderate to maximal facilitation for reaching   4-5 reps with additional time     Trunk Rotation   15 reps with minima facilitation  15 reps with minimal facilitation and additional time            Side Scooting   Minimal facilitation and assistance for positioning and weightbearing of L Hand through his L knee and forward forward flexion at the trunk        Braces/Orthotics/Lines/Etc:   · O2 device: Room air  Treatment/Session Assessment:    Response to Treatment:  Pt presented slightly tearful during session due to discussion with wife earlier in the day. · Interdisciplinary Collaboration:   o Occupational Therapist  o Registered Nurse  · After treatment position/precautions:   o Supine in bed  o Bed/Chair-wheels locked  o Bed in low position  o Call light within reach  o RN notified   · Compliance with Program/Exercises: Compliant all of the time, Will assess as treatment progresses. · Recommendations/Intent for next treatment session:   \"Next visit will focus on advancements to more challenging activities and reduction in assistance provided\".   Total Treatment Duration:   OT Patient Time In/Time Out  Time In: 1416  Time Out: [address], OT

## 2020-05-07 NOTE — PROGRESS NOTES
Problem: Mobility Impaired (Adult and Pediatric)  Goal: *Acute Goals and Plan of Care  Description  GOALS UPDATED ON RE-ASSESSMENT 5/5/2020 (BOLD INDICATES UPDATED GOAL):  1. Patient will perform bed mobility with MODIFIED INDEPENDENCE and 0 verbal cues within 7 treatment days. 2. Patient will perform transfer bed to chair/wheelchair with MODIFIED INDEPENDENCE within 7 treatment days. 3. Patient will demonstrate FAIR+ dynamic balance throughout ambulation within 7 treatment days. 4. Patient demonstrate 3+/5 strength in L LE hip flexion, hip abduction, and hip adduction within 7 treatment days. 5. Patient will ambulate 300ft+ with STAND BY ASSIST and least retrictive assistive device and L LE AFO within 7 treatment days. 6. Patient will perform wheelchair mobility 75 ft with modified independence within 7 treatment days. 7. Patient will don/doff LUE sling for protection of L shoulder during transfers/gait with set up within 7 treatment days. 8. Patient will don/doff L LE AFO with MINIMAL ASSISTANCE within 7 treatment days. PHYSICAL THERAPY: Daily Note and AM 5/7/2020  INPATIENT: PT Visit Days : 3  Payor: [address] [place], [place] [address] [address] / Plan: [address] [place] [address] / Product Type: Medicaid /       NAME/AGE/GENDER: Jenine Oppenheim is a 53 y.o. male   PRIMARY DIAGNOSIS: Acute ischemic stroke ([facility] [I63.9]  Cerebrovascular accident (CVA) due to embolism ([address] [D29.6] Acute embolic stroke ([address] Acute embolic stroke ([facility] [address]       ICD-10: Treatment Diagnosis:   · Difficulty in walking, Not elsewhere classified (R26.2)  · Hemiplegia and hemiparesis following cerebral infarction affecting left non-dominant side (R62.643)   Precaution/Allergies:  Patient has no known allergies. ASSESSMENT:     Mr. Henriquez is a 53year old admitted R MCA CVA. Patient seen this AM with orthotist from [facility] Shadi Hernandez [phone]) to fit L LE AFO.  AFO donned with maximal assistance and adjusted per orthotist. Performed bed mobility with minimal assistance, transfers with CGA, and ambulation x150ft with AFO and B tennis shoes with CGA. Improvements seen in gait mechanics including less circumduction and right lateral learn to clear L LE and improvement of hyperextension of L LE. Orthotist to apply leather to sole of left forefoot to help with gliding if needed through swing phase. Cues for gait mechanics, assistive device sequencing, and dynamic standing balance throughout. Good progress. Will continue with stated plan of care. This section established at most recent assessment   PROBLEM LIST (Impairments causing functional limitations):  1. Decreased Strength  2. Decreased ADL/Functional Activities  3. Decreased Transfer Abilities  4. Decreased Ambulation Ability/Technique  5. Decreased Balance  6. Increased Pain  7. Decreased Activity Tolerance  8. Increased Fatigue  9. Decreased Flexibility/Joint Mobility   INTERVENTIONS PLANNED: (Benefits and precautions of physical therapy have been discussed with the patient.)  1. Balance Exercise  2. Bed Mobility  3. Family Education  4. Gait Training  5. Home Exercise Program (HEP)  6. Manual Therapy  7. Neuromuscular Re-education/Strengthening  8. Range of Motion (ROM)  9. Therapeutic Activites  10. Therapeutic Exercise/Strengthening  11. Transfer Training     TREATMENT PLAN: Frequency/Duration: 5 times a week for duration of hospital stay  Rehabilitation Potential For Stated Goals: Good     REHAB RECOMMENDATIONS (at time of discharge pending progress):    Placement: It is my opinion, based on this patient's performance to date, that Mr. Ana Olson may benefit from intensive therapy at an [address] [place] [address] after discharge due to a probable need for close medical supervision by a rehab physician, a probable need for multiple therapy disciplines and potential to make ongoing and sustainable functional improvement that is of practical value. .  Equipment:    TBD pending progress with therapy. HISTORY:   History of Present Injury/Illness (Reason for Referral):  Per H&P: \"Pt is a 54 y/o smoker with DM, HTN, who presented to ER with L leg and arm weakness, L facial droop, dysarthria. First noted L leg weakness late night 12/11 when he woke up to go to the bathroom. He was normal when he went to bed around 1030. Woke up this morning and had persistent weakness L leg and also now noted in L arm. EMS called. Noted to have slurred speech and L facial droop as well. Code S called in ER around 9am.  MRI with acute infarcts in L cerebellar hemisphere, deep frontoparietal white matter, and R paramedian yariel. Also noted old lacunar infarcts. No large vessel occlusion on CTA, but some stenosis noted. No hx afib, TIA, CVA. No CP, palpitations, SOB. \"  Past Medical History/Comorbidities:   Mr. Joanne Beach  has a past medical history of Acute ischemic stroke ([address] (12/12/2019), Acute pancreatitis (11/19/2014), Cerebrovascular accident (CVA) due to embolism ([address] (12/12/2019), Diabetes ([address] (2002), Diabetes ([address] Diabetes mellitus, and Hypertension. He also has no past medical history of Arthritis, Asthma, Autoimmune disease ([address] CAD (coronary artery disease), Cancer ([address] Chronic kidney disease, COPD, Dementia, Dementia ([address] Heart failure ([address] Ill-defined condition, Infectious disease, Liver disease, Other ill-defined conditions(799.89), Psychiatric disorder, PUD (peptic ulcer disease), Seizures ([address] or Sleep disorder. Mr. Joanne Beach  has a past surgical history that includes hx hernia repair and hx orthopaedic.   Social History/Living Environment:   Home Environment: Apartment  # Steps to Enter: 12  Rails to Enter: Yes  [employer] : Bilateral  One/Two Story Residence: One story  Living Alone: No  Support Systems: Spouse/Significant Other/Partner  Patient Expects to be Discharged to[de-identified] Unknown  Current DME Used/Available at Home: None  Tub or Shower Type: Tub/Shower combination  Prior Level of Function/Work/Activity:  Independent, lives with wife in 2nd story 1 level apartment. No recent falls. Number of Personal Factors/Comorbidities that affect the Plan of Care: 1-2: MODERATE COMPLEXITY   EXAMINATION:   Most Recent Physical Functioning:   Gross Assessment: left hip grossly 2/5 to 3-/5  AROM: Generally decreased, functional(L UE/LE)  PROM: Within functional limits  Strength: Generally decreased, functional(L UE/LE)  Coordination: Generally decreased, functional(L UE/LE)  Tone: Abnormal(L UE; L LE)  Sensation: Intact(light touch B UE/LE)                    Balance:  Sitting: Impaired  Sitting - Static: Good (unsupported)  Sitting - Dynamic: Fair (occasional)  Standing: Impaired  Standing - Static: Good  Standing - Dynamic : Fair Bed Mobility:  Supine to Sit: Minimum assistance  Sit to Supine: Stand-by assistance  Scooting: Minimum assistance  Interventions: Safety awareness training; Tactile cues; Verbal cues  Wheelchair Mobility: NT     Transfers:  Sit to Stand: Contact guard assistance  Stand to Sit: Stand-by assistance  Bed to Chair: Stand-by assistance  Interventions: Safety awareness training; Tactile cues; Verbal cues  Gait:     Base of Support: Center of gravity altered  Gait Abnormalities: Circumduction; Hemiplegic  Distance (ft): 150 Feet (ft)  Assistive Device: Walker luke;Gait belt  Ambulation - Level of Assistance: Contact guard assistance  Interventions: Safety awareness training; Tactile cues; Verbal cues      Body Structures Involved:  1. Nerves  2. Voice/Speech  3. Bones  4. Joints  5. Muscles Body Functions Affected:  1. Mental  2. Sensory/Pain  3. Neuromusculoskeletal  4. Movement Related Activities and Participation Affected:  1. General Tasks and Demands  2. Mobility  3. Self Care  4.  Interpersonal Interactions and Relationships   Number of elements that affect the Plan of Care: 4+: HIGH COMPLEXITY   CLINICAL PRESENTATION:   Presentation: Evolving clinical presentation with changing clinical characteristics: MODERATE COMPLEXITY   CLINICAL DECISION MAKIN [facility] Mobility Inpatient Short Form  How much difficulty does the patient currently have. .. Unable A Lot A Little None   1. Turning over in bed (including adjusting bedclothes, sheets and blankets)? [] 1   [] 2   [] 3   [x] 4   2. Sitting down on and standing up from a chair with arms ( e.g., wheelchair, bedside commode, etc.)   [] 1   [] 2   [x] 3   [] 4   3. Moving from lying on back to sitting on the side of the bed? [] 1   [] 2   [] 3   [x] 4   How much help from another person does the patient currently need. .. Total A Lot A Little None   4. Moving to and from a bed to a chair (including a wheelchair)? [] 1   [] 2   [x] 3   [] 4   5. Need to walk in hospital room? [] 1   [] 2   [x] 3   [] 4   6. Climbing 3-5 steps with a railing? [] 1   [x] 2   [] 3   [] 4   © , Trustees of [facility] [employer], under license to [employer]. All rights reserved      Score:  Initial: 13 Most Recent: 19 (Date: 2020)    Interpretation of Tool:  Represents activities that are increasingly more difficult (i.e. Bed mobility, Transfers, Gait). Medical Necessity:     · Patient is expected to demonstrate progress in   · strength, range of motion, balance, coordination, and functional technique  ·  to   · increase independence with all mobility. · .  Reason for Services/Other Comments:  · Patient continues to require skilled intervention due to   · medical complications and mobility deficits which impact his level of function, safety, and independence as indicated above.    · .   Use of outcome tool(s) and clinical judgement create a POC that gives a: Questionable prediction of patient's progress: MODERATE COMPLEXITY        TREATMENT:      Pre-treatment Symptoms/Complaints: see above  Pain: Initial: 0/10 Post Session:  0/10       Gait Training (25 Minutes):  Gait training to improve and/or restore physical functioning as related to mobility, strength, balance and coordination. Ambulated 150 Feet (ft) with contact guard assist and minimal visual, verbal and tactile cues related to their gait mechanics, dynamic standing balance, and assistive device sequencing. Gait training with L LE AFO today. Assistive Device: Walker luke, Gait belt  Ambulation - Level of Assistance: Contact guard assistance  Distance (ft): 150 Feet (ft)  Rail Use: Right   Interventions: Safety awareness training, Tactile cues, Verbal cues  Duration: 25 Minutes        Braces/Orthotics/Lines/Etc:   · Sling to LUE  · L LE AFO  Treatment/Session Assessment:    · Response to Treatment:  See above  · Interdisciplinary Collaboration:   o Physical Therapist  o Registered Nurse  o Rehabilitation Attendant  o Orthotist  · After treatment position/precautions:   o Up in [facility] per patient request; patient did not want chair alarm placed; RN at bedside to transfer patient back to bed; needs met; patient in NAD   · Compliance with Program/Exercises: Compliant all of the time  · Recommendations/Intent for next treatment session: \"Next visit will focus on advancements to more challenging activities and reduction in assistance provided\".     Total Treatment Duration:  PT Patient Time In/Time Out  Time In: 1032  Time Out: 1305 [address], [facility]

## 2020-05-08 PROCEDURE — 74011250637 HC RX REV CODE- 250/637: Performed by: INTERNAL MEDICINE

## 2020-05-08 PROCEDURE — 74011250637 HC RX REV CODE- 250/637: Performed by: HOSPITALIST

## 2020-05-08 PROCEDURE — 74011250637 HC RX REV CODE- 250/637: Performed by: NURSE PRACTITIONER

## 2020-05-08 PROCEDURE — 97530 THERAPEUTIC ACTIVITIES: CPT

## 2020-05-08 PROCEDURE — 74011250636 HC RX REV CODE- 250/636: Performed by: INTERNAL MEDICINE

## 2020-05-08 PROCEDURE — 65270000029 HC RM PRIVATE

## 2020-05-08 RX ADMIN — ATORVASTATIN CALCIUM 80 MG: 80 TABLET, FILM COATED ORAL at 21:54

## 2020-05-08 RX ADMIN — GUAIFENESIN 100 MG: 100 SOLUTION ORAL at 17:36

## 2020-05-08 RX ADMIN — METFORMIN HYDROCHLORIDE 500 MG: 500 TABLET ORAL at 08:54

## 2020-05-08 RX ADMIN — ACETAMINOPHEN 650 MG: 325 TABLET, FILM COATED ORAL at 21:54

## 2020-05-08 RX ADMIN — LISINOPRIL 40 MG: 20 TABLET ORAL at 08:54

## 2020-05-08 RX ADMIN — Medication 400 MG: at 17:36

## 2020-05-08 RX ADMIN — Medication 400 MG: at 08:53

## 2020-05-08 RX ADMIN — ACETAMINOPHEN 650 MG: 325 TABLET, FILM COATED ORAL at 14:22

## 2020-05-08 RX ADMIN — ACETAMINOPHEN 650 MG: 325 TABLET, FILM COATED ORAL at 05:47

## 2020-05-08 RX ADMIN — METFORMIN HYDROCHLORIDE 500 MG: 500 TABLET ORAL at 17:36

## 2020-05-08 RX ADMIN — DIPHENHYDRAMINE HYDROCHLORIDE 25 MG: 25 CAPSULE ORAL at 17:36

## 2020-05-08 RX ADMIN — ASPIRIN 81 MG 81 MG: 81 TABLET ORAL at 08:54

## 2020-05-08 RX ADMIN — ENOXAPARIN SODIUM 40 MG: 40 INJECTION SUBCUTANEOUS at 14:21

## 2020-05-08 RX ADMIN — METOPROLOL SUCCINATE 25 MG: 25 TABLET, FILM COATED, EXTENDED RELEASE ORAL at 08:54

## 2020-05-08 NOTE — PROGRESS NOTES
Problem: Patient Education: Go to Patient Education Activity  Goal: Patient/Family Education  Outcome: Progressing Towards Goal     Problem: Pressure Injury - Risk of  Goal: *Prevention of pressure injury  Description: Document Dewey Scale and appropriate interventions in the flowsheet. Outcome: Progressing Towards Goal  Note: Pressure Injury Interventions:  Sensory Interventions: Assess changes in LOC    Moisture Interventions: Absorbent underpads    Activity Interventions: Increase time out of bed    Mobility Interventions: Pressure redistribution bed/mattress (bed type)    Nutrition Interventions: Offer support with meals,snacks and hydration    Friction and Shear Interventions: Foam dressings/transparent film/skin sealants                Problem: Patient Education: Go to Patient Education Activity  Goal: Patient/Family Education  Outcome: Progressing Towards Goal     Problem: Falls - Risk of  Goal: *Absence of Falls  Description: Document Jeanne Fall Risk and appropriate interventions in the flowsheet. Outcome: Progressing Towards Goal  Note: Fall Risk Interventions:  Mobility Interventions: Communicate number of staff needed for ambulation/transfer    Mentation Interventions: Bed/chair exit alarm    Medication Interventions: Teach patient to arise slowly    Elimination Interventions: Bed/chair exit alarm    History of Falls Interventions: Door open when patient unattended         Problem: Patient Education: Go to Patient Education Activity  Goal: Patient/Family Education  Outcome: Progressing Towards Goal     Problem: Diabetes Self-Management  Goal: *Disease process and treatment process  Description: Define diabetes and identify own type of diabetes; list 3 options for treating diabetes.   Outcome: Progressing Towards Goal  Goal: *Incorporating nutritional management into lifestyle  Description: Describe effect of type, amount and timing of food on blood glucose; list 3 methods for planning meals. Outcome: Progressing Towards Goal  Goal: *Incorporating physical activity into lifestyle  Description: State effect of exercise on blood glucose levels. Outcome: Progressing Towards Goal  Goal: *Developing strategies to promote health/change behavior  Description: Define the ABC's of diabetes; identify appropriate screenings, schedule and personal plan for screenings. Outcome: Progressing Towards Goal  Goal: *Using medications safely  Description: State effect of diabetes medications on diabetes; name diabetes medication taking, action and side effects. Outcome: Progressing Towards Goal  Goal: *Monitoring blood glucose, interpreting and using results  Description: Identify recommended blood glucose targets  and personal targets. Outcome: Progressing Towards Goal  Goal: *Prevention, detection, treatment of acute complications  Description: List symptoms of hyper- and hypoglycemia; describe how to treat low blood sugar and actions for lowering  high blood glucose level. Outcome: Progressing Towards Goal  Goal: *Prevention, detection and treatment of chronic complications  Description: Define the natural course of diabetes and describe the relationship of blood glucose levels to long term complications of diabetes.   Outcome: Progressing Towards Goal  Goal: *Developing strategies to address psychosocial issues  Description: Describe feelings about living with diabetes; identify support needed and support network  Outcome: Progressing Towards Goal     Problem: Patient Education: Go to Patient Education Activity  Goal: Patient/Family Education  Outcome: Progressing Towards Goal     Problem: Patient Education: Go to Patient Education Activity  Goal: Patient/Family Education  Outcome: Progressing Towards Goal     Problem: Nutrition Deficit  Goal: *Optimize nutritional status  Outcome: Progressing Towards Goal     Problem: Patient Education: Go to Patient Education Activity  Goal: Patient/Family Education  Outcome: Progressing Towards Goal     Problem: General Medical Care Plan  Goal: *Vital signs within specified parameters  Outcome: Progressing Towards Goal  Goal: *Labs within defined limits  Outcome: Progressing Towards Goal  Goal: *Absence of infection signs and symptoms  Description: Wash hand more often   Outcome: Progressing Towards Goal  Goal: *Optimal pain control at patient's stated goal  Outcome: Progressing Towards Goal  Goal: *Skin integrity maintained  Outcome: Progressing Towards Goal  Goal: *Fluid volume balance  Outcome: Progressing Towards Goal  Goal: *Optimize nutritional status  Outcome: Progressing Towards Goal  Goal: *Anxiety reduced or absent  Outcome: Progressing Towards Goal  Goal: *Progressive mobility and function (eg: ADL's)  Outcome: Progressing Towards Goal     Problem: Patient Education: Go to Patient Education Activity  Goal: Patient/Family Education  Outcome: Progressing Towards Goal     Problem: Patient Education: Go to Patient Education Activity  Goal: Patient/Family Education  Outcome: Progressing Towards Goal     Problem: Patient Education: Go to Patient Education Activity  Goal: Patient/Family Education  Outcome: Progressing Towards Goal     Problem: Patient Education: Go to Patient Education Activity  Goal: Patient/Family Education  Outcome: Progressing Towards Goal     Problem: Ischemic Stroke: Discharge Outcomes  Goal: *Verbalizes anxiety and depression are reduced or absent  Outcome: Progressing Towards Goal  Goal: *Verbalize understanding of risk factor modification(Stroke Metric)  Outcome: Progressing Towards Goal  Goal: *Hemodynamically stable  Outcome: Progressing Towards Goal  Goal: *Absence of aspiration pneumonia  Outcome: Progressing Towards Goal  Goal: *Aware of needed dietary changes  Outcome: Progressing Towards Goal  Goal: *Verbalize understanding of prescribed medications including anti-coagulants, anti-lipid, and/or anti-platelets(Stroke Metric)  Outcome: Progressing Towards Goal  Goal: *Tolerating diet  Outcome: Progressing Towards Goal  Goal: *Aware of follow-up diagnostics related to anticoagulants  Outcome: Progressing Towards Goal  Goal: *Ability to perform ADLs and demonstrates progressive mobility and function  Outcome: Progressing Towards Goal  Goal: *Absence of DVT(Stroke Metric)  Outcome: Progressing Towards Goal  Goal: *Absence of aspiration  Outcome: Progressing Towards Goal  Goal: *Optimal pain control at patient's stated goal  Outcome: Progressing Towards Goal  Goal: *Home safety concerns addressed  Outcome: Progressing Towards Goal  Goal: *Describes available resources and support systems  Outcome: Progressing Towards Goal  Goal: *Verbalizes understanding of activation of EMS(911) for stroke symptoms(Stroke Metric)  Outcome: Progressing Towards Goal  Goal: *Understands and describes signs and symptoms to report to providers(Stroke Metric)  Outcome: Progressing Towards Goal  Goal: *Neurolgocially stable (absence of additional neurological deficits)  Outcome: Progressing Towards Goal  Goal: *Verbalizes importance of follow-up with primary care physician(Stroke Metric)  Outcome: Progressing Towards Goal  Goal: *Smoking cessation discussed,if applicable(Stroke Metric)  Outcome: Progressing Towards Goal  Goal: *Depression screening completed(Stroke Metric)  Outcome: Progressing Towards Goal     Problem: Pain  Goal: *Control of Pain  Outcome: Progressing Towards Goal     Problem: Patient Education: Go to Patient Education Activity  Goal: Patient/Family Education  Outcome: Progressing Towards Goal     Problem: Patient Education: Go to Patient Education Activity  Goal: Patient/Family Education  Outcome: Progressing Towards Goal

## 2020-05-08 NOTE — PROGRESS NOTES
Problem: Mobility Impaired (Adult and Pediatric)  Goal: *Acute Goals and Plan of Care  Description  GOALS UPDATED ON RE-ASSESSMENT 5/5/2020 (BOLD INDICATES UPDATED GOAL):  1. Patient will perform bed mobility with MODIFIED INDEPENDENCE and 0 verbal cues within 7 treatment days. 2. Patient will perform transfer bed to chair/wheelchair with MODIFIED INDEPENDENCE within 7 treatment days. 3. Patient will demonstrate FAIR+ dynamic balance throughout ambulation within 7 treatment days. 4. Patient demonstrate 3+/5 strength in L LE hip flexion, hip abduction, and hip adduction within 7 treatment days. 5. Patient will ambulate 300ft+ with STAND BY ASSIST and least retrictive assistive device and L LE AFO within 7 treatment days. 6. Patient will perform wheelchair mobility 75 ft with modified independence within 7 treatment days. 7. Patient will don/doff LUE sling for protection of L shoulder during transfers/gait with set up within 7 treatment days. 8. Patient will instruct caregiver how to don/doff L LE AFO with independence within 7 treatment days. PHYSICAL THERAPY: Daily Note and PM 5/8/2020  INPATIENT: PT Visit Days : 4  [facility] [id number] [facility] [address] / Plan: [address] [place] [address] Avenue / Product Type: Medicaid /       NAME/AGE/GENDER: Fernanda Vieyra is a 53 y.o. male   PRIMARY DIAGNOSIS: Acute ischemic stroke ([facility] [address] [I63.9]  Cerebrovascular accident (CVA) due to embolism ([address] [K31.6] Acute embolic stroke ([address] Acute embolic stroke ([facility] [facility]       ICD-10: Treatment Diagnosis:   · Difficulty in walking, Not elsewhere classified (R26.2)  · Hemiplegia and hemiparesis following cerebral infarction affecting left non-dominant side (I61.456)   Precaution/Allergies:  Patient has no known allergies. ASSESSMENT:     Mr. Henriquez is a 53year old admitted R MCA CVA.  Patient seen this PM for physical therapy treatment session: presents up in wheelchair with ambulation team, endorses 0/10 pain, and motivated to participate. Continues with flat affect; encouragement provided. Donned L LE AFO and tennis shoes with total assistance; adjusted fit and performed skin checks. Patient performed transfers with CGA to minimal assistance from low wheelchair height and ambulation [id number] and [id number] with hemiwalker and gait belt. Cues for gait mechanics, assistive device sequencing, and dynamic standing balance throughout. Mechanics much improved with L LE AFO, circumduction decreased, knee hyperextension decreased, and improved clearance during swing phase of gait. This pattern required patient to rely more on L quad strength which fatigued the leg quicker and mild flexion throughout. One loss of balance with minimal assistance to correct. CGA to transfer from [facility] to bed. Overall much improved mechanics and continues with slow steady progress toward goals. Continue with stated plan of care; recommend addressing functional transfers with repetition next treatment session to increase independence. This section established at most recent assessment   PROBLEM LIST (Impairments causing functional limitations):  1. Decreased Strength  2. Decreased ADL/Functional Activities  3. Decreased Transfer Abilities  4. Decreased Ambulation Ability/Technique  5. Decreased Balance  6. Increased Pain  7. Decreased Activity Tolerance  8. Increased Fatigue  9. Decreased Flexibility/Joint Mobility   INTERVENTIONS PLANNED: (Benefits and precautions of physical therapy have been discussed with the patient.)  1. Balance Exercise  2. Bed Mobility  3. Family Education  4. Gait Training  5. Home Exercise Program (HEP)  6. Manual Therapy  7. Neuromuscular Re-education/Strengthening  8. Range of Motion (ROM)  9. Therapeutic Activites  10. Therapeutic Exercise/Strengthening  11.  Transfer Training     TREATMENT PLAN: Frequency/Duration: 5 times a week for duration of hospital stay  Rehabilitation Potential For Stated Goals: Good     REHAB RECOMMENDATIONS (at time of discharge pending progress):    Placement: It is my opinion, based on this patient's performance to date, that Mr. Aristeo Rascon may benefit from intensive therapy at an [facility] after discharge due to a probable need for close medical supervision by a rehab physician, a probable need for multiple therapy disciplines and potential to make ongoing and sustainable functional improvement that is of practical value. .  Equipment:    TBD pending progress with therapy. HISTORY:   History of Present Injury/Illness (Reason for Referral):  Per H&P: \"Pt is a 52 y/o smoker with DM, HTN, who presented to ER with L leg and arm weakness, L facial droop, dysarthria. First noted L leg weakness late night 12/11 when he woke up to go to the bathroom. He was normal when he went to bed around 1030. Woke up this morning and had persistent weakness L leg and also now noted in L arm. EMS called. Noted to have slurred speech and L facial droop as well. Code S called in ER around 9am.  MRI with acute infarcts in L cerebellar hemisphere, deep frontoparietal white matter, and R paramedian yariel. Also noted old lacunar infarcts. No large vessel occlusion on CTA, but some stenosis noted. No hx afib, TIA, CVA. No CP, palpitations, SOB. \"  Past Medical History/Comorbidities:   Mr. Aristeo Rascon  has a past medical history of Acute ischemic stroke ([address] (12/12/2019), Acute pancreatitis (11/19/2014), Cerebrovascular accident (CVA) due to embolism ([address] (12/12/2019), Diabetes ([address] (2002), Diabetes ([address] Diabetes mellitus, and Hypertension.  He also has no past medical history of Arthritis, Asthma, Autoimmune disease ([address] CAD (coronary artery disease), Cancer ([address] Chronic kidney disease, COPD, Dementia, Dementia ([address] Heart failure ([address] Ill-defined condition, Infectious disease, Liver disease, Other ill-defined conditions(799.89), Psychiatric disorder, PUD (peptic ulcer disease), Seizures ([facility] [address] or Sleep disorder. Mr. Elvin Farooq  has a past surgical history that includes hx hernia repair and hx orthopaedic. Social History/Living Environment:   Home Environment: Apartment  # Steps to Enter: 12  Rails to Enter: Yes  [employer] : Bilateral  One/Two Story Residence: One story  Living Alone: No  Support Systems: Spouse/Significant Other/Partner  Patient Expects to be Discharged to[de-identified] Unknown  Current DME Used/Available at Home: None  Tub or Shower Type: Tub/Shower combination  Prior Level of Function/Work/Activity:  Independent, lives with wife in 2nd story 1 level apartment. No recent falls. Number of Personal Factors/Comorbidities that affect the Plan of Care: 1-2: MODERATE COMPLEXITY   EXAMINATION:   Most Recent Physical Functioning:   Gross Assessment: left hip grossly 2/5 to 3-/5  AROM: Generally decreased, functional(L UE/LE)  PROM: Within functional limits  Strength: Generally decreased, functional(L UE/LE)  Coordination: Generally decreased, functional(L UE/LE)  Tone: Abnormal(L UE; L LE)  Sensation: Intact(light touch B UE/LE)                    Balance:  Sitting: Impaired  Sitting - Static: Good (unsupported)  Sitting - Dynamic: Fair (occasional)  Standing: Impaired  Standing - Static: Fair  Standing - Dynamic : Fair Bed Mobility:  Scooting: Contact guard assistance  Interventions: Safety awareness training; Tactile cues; Verbal cues  Wheelchair Mobility: NT     Transfers:  Sit to Stand: Contact guard assistance;Minimum assistance(Min from low WC)  Stand to Sit: Stand-by assistance  Bed to Chair: Contact guard assistance  Interventions: Safety awareness training; Tactile cues; Verbal cues  Gait:     Base of Support: Center of gravity altered;Shift to right  Gait Abnormalities: Hemiplegic;Trunk sway increased  Distance (ft): (1x300' 1x100)  Assistive Device: Walker luke;Gait belt  Ambulation - Level of Assistance: Contact guard assistance  Interventions: Safety awareness training; Tactile cues;Verbal cues      Body Structures Involved:  1. Nerves  2. Voice/Speech  3. Bones  4. Joints  5. Muscles Body Functions Affected:  1. Mental  2. Sensory/Pain  3. Neuromusculoskeletal  4. Movement Related Activities and Participation Affected:  1. General Tasks and Demands  2. Mobility  3. Self Care  4. Interpersonal Interactions and Relationships   Number of elements that affect the Plan of Care: 4+: HIGH COMPLEXITY   CLINICAL PRESENTATION:   Presentation: Evolving clinical presentation with changing clinical characteristics: MODERATE COMPLEXITY   CLINICAL DECISION MAKIN [facility] Inpatient Short Form  How much difficulty does the patient currently have. .. Unable A Lot A Little None   1. Turning over in bed (including adjusting bedclothes, sheets and blankets)? [] 1   [] 2   [] 3   [x] 4   2. Sitting down on and standing up from a chair with arms ( e.g., wheelchair, bedside commode, etc.)   [] 1   [] 2   [x] 3   [] 4   3. Moving from lying on back to sitting on the side of the bed? [] 1   [] 2   [] 3   [x] 4   How much help from another person does the patient currently need. .. Total A Lot A Little None   4. Moving to and from a bed to a chair (including a wheelchair)? [] 1   [] 2   [x] 3   [] 4   5. Need to walk in hospital room? [] 1   [] 2   [x] 3   [] 4   6. Climbing 3-5 steps with a railing? [] 1   [x] 2   [] 3   [] 4   © , Trustees of [address] [place], [place] [address], under license to [employer] All rights reserved      Score:  Initial: 13 Most Recent: 19 (Date: 2020)    Interpretation of Tool:  Represents activities that are increasingly more difficult (i.e. Bed mobility, Transfers, Gait). Medical Necessity:     · Patient is expected to demonstrate progress in   · strength, range of motion, balance, coordination, and functional technique  ·  to   · increase independence with all mobility.    · .  Reason for Services/Other Comments:  · Patient continues to require skilled intervention due to   · medical complications and mobility deficits which impact his level of function, safety, and independence as indicated above. · .   Use of outcome tool(s) and clinical judgement create a POC that gives a: Questionable prediction of patient's progress: MODERATE COMPLEXITY        TREATMENT:      Pre-treatment Symptoms/Complaints: see above  Pain: Initial: 0/10 Post Session:  0/10       Therapeutic Activity (  39 Minutes): Therapeutic activities including bed mobility, transfer training, balance training, safety awareness training, ambulation on level ground with L LE AFO and luke walker with CGA-minimal assistance and patient education o improve mobility, strength, balance and coordination. Required minimal visual, verbal and tactile cues to promote static and dynamic balance in standing. Braces/Orthotics/Lines/Etc:   · Sling to LUE  · L LE AFO  Treatment/Session Assessment:    · Response to Treatment:  See above  · Interdisciplinary Collaboration:   o Physical Therapist  o Registered Nurse  o Rehabilitation Attendant  · After treatment position/precautions:   o Up in [facility] per patient request; patient did not want chair alarm placed; RN at bedside to transfer patient back to bed; needs met; patient in NAD   · Compliance with Program/Exercises: Compliant all of the time  · Recommendations/Intent for next treatment session: \"Next visit will focus on advancements to more challenging activities and reduction in assistance provided\".     Total Treatment Duration:  PT Patient Time In/Time Out  Time In: 1340  Time Out: CHRISTIANNE Jain

## 2020-05-08 NOTE — PROGRESS NOTES
Brief hello to patient as he was working with therapy          Precautions:  PPE worn on all visits  No contact with patient or family by touch  Social distancing observed          Jany Lim, staff Dontrell lozano 06, [address] [facility]  /   [email or web address]

## 2020-05-09 PROCEDURE — 74011250636 HC RX REV CODE- 250/636: Performed by: INTERNAL MEDICINE

## 2020-05-09 PROCEDURE — 74011250637 HC RX REV CODE- 250/637: Performed by: NURSE PRACTITIONER

## 2020-05-09 PROCEDURE — 74011250637 HC RX REV CODE- 250/637: Performed by: INTERNAL MEDICINE

## 2020-05-09 PROCEDURE — 74011250637 HC RX REV CODE- 250/637: Performed by: HOSPITALIST

## 2020-05-09 PROCEDURE — 65270000029 HC RM PRIVATE

## 2020-05-09 RX ADMIN — ACETAMINOPHEN 650 MG: 325 TABLET, FILM COATED ORAL at 13:05

## 2020-05-09 RX ADMIN — METOPROLOL SUCCINATE 25 MG: 25 TABLET, FILM COATED, EXTENDED RELEASE ORAL at 08:12

## 2020-05-09 RX ADMIN — ACETAMINOPHEN 650 MG: 325 TABLET, FILM COATED ORAL at 23:04

## 2020-05-09 RX ADMIN — METFORMIN HYDROCHLORIDE 500 MG: 500 TABLET ORAL at 17:02

## 2020-05-09 RX ADMIN — METFORMIN HYDROCHLORIDE 500 MG: 500 TABLET ORAL at 08:12

## 2020-05-09 RX ADMIN — ASPIRIN 81 MG 81 MG: 81 TABLET ORAL at 08:12

## 2020-05-09 RX ADMIN — ATORVASTATIN CALCIUM 80 MG: 80 TABLET, FILM COATED ORAL at 23:04

## 2020-05-09 RX ADMIN — ENOXAPARIN SODIUM 40 MG: 40 INJECTION SUBCUTANEOUS at 13:05

## 2020-05-09 RX ADMIN — GUAIFENESIN 100 MG: 100 SOLUTION ORAL at 17:02

## 2020-05-09 RX ADMIN — DIPHENHYDRAMINE HYDROCHLORIDE 25 MG: 25 CAPSULE ORAL at 17:02

## 2020-05-09 RX ADMIN — LISINOPRIL 40 MG: 20 TABLET ORAL at 08:12

## 2020-05-09 RX ADMIN — Medication 400 MG: at 08:12

## 2020-05-09 RX ADMIN — Medication 400 MG: at 17:02

## 2020-05-09 RX ADMIN — ACETAMINOPHEN 650 MG: 325 TABLET, FILM COATED ORAL at 06:23

## 2020-05-09 NOTE — PROGRESS NOTES
Hospitalist Progress Note     Admit Date:  2019  9:07 AM   Name:  Ebenezer Lima   Age:  53 y.o.  :  1975   MRN:  [id number]   PCP:  Jayesh Herr MD  Treatment Team: Attending Provider: Brandon Lucas MD; Care Manager: Rose Anna, [facility]; Utilization Review: Soumya Guzmán RN; Nurse Practitioner: Mannie Bruce NP; Care Manager: Betty Joaquin RN; Hospitalist: Katherin Rose NP; Utilization Review: Makenna Deleon RN; Charge Nurse: Thad Cordero RN    Subjective:   HPI and or CC:  54 yo AA male with PMH of DM, HTN admitted  for CVA affecting numerous areas of the brain. He was placed on ASA and statin. He has remained alert and oriented x3. Some movement to right side but unable to grasp with right hand. He is currently waiting on placement and this has remained difficult due to waiting on Medicaid. :  Alert and oriented x3. Voices no complaints today. Continues to wait for placement. Continues to wait on SSI and Medicaid approval.  He remains afebrile. Objective:     Patient Vitals for the past 24 hrs:   Temp Pulse Resp BP SpO2   20 0800 97.7 °F (36.5 °C) 66 18 139/76 96 %   20 0400 97.5 °F (36.4 °C) 76 17 135/82 99 %   20 0000 97.5 °F (36.4 °C) 75 17 130/85 96 %   20 2000 98 °F (36.7 °C) 78 17 113/83 98 %   20 1600 97.8 °F (36.6 °C) 77 18 115/76 94 %   20 1200 98.3 °F (36.8 °C) 83 18 122/89 97 %     Oxygen Therapy  O2 Sat (%): 96 % (20 0800)  Pulse via Oximetry: 75 beats per minute (20 0400)  O2 Device: Room air (20 1648)  No intake or output data in the 24 hours ending 20 1012      REVIEW OF SYSTEMS: Comprehensive ROS performed and negative except as stated in HPI. Physical Examination:  General:       No acute distress    Lungs:          CTA bilaterally. Resp even and nonlabored  Heart:            S1S2 present without murmurs rubs gallops. RRR.  No LE edema  Abdomen:    Soft, non tender, non distended. BS present  Extremities: No cyanosis. Left sided hemiparesis  Neurologic:  moves right hand and raises arm at elbow moderately, unable to squeeze my fingers left hand, mildly slurred speech.  PERRLA, strength 4/5 RUE/RLE. Data Review:  I have reviewed all labs, meds, telemetry events, and studies from the last 24 hours. No results found for this or any previous visit (from the past 24 hour(s)). All Micro Results     None          Current Meds:  Current Facility-Administered Medications   Medication Dose Route Frequency    magnesium oxide (MAG-OX) tablet 400 mg  400 mg Oral BID    metFORMIN (GLUCOPHAGE) tablet 500 mg  500 mg Oral BID WITH MEALS    acetaminophen (TYLENOL) tablet 650 mg  650 mg Oral Q4H PRN    diphenhydrAMINE (BENADRYL) capsule 25 mg  25 mg Oral Q6H PRN    acetaminophen (TYLENOL) tablet 650 mg  650 mg Oral Q8H    lip protectant (BLISTEX) ointment 1 Each  1 Each Topical PRN    metoprolol succinate (TOPROL-XL) XL tablet 25 mg  25 mg Oral DAILY    polyethylene glycol (MIRALAX) packet 17 g  17 g Oral DAILY PRN    guaiFENesin (ROBITUSSIN) 100 mg/5 mL oral liquid 100 mg  100 mg Oral Q4H PRN    hydrALAZINE (APRESOLINE) 20 mg/mL injection 20 mg  20 mg IntraVENous Q4H PRN    atorvastatin (LIPITOR) tablet 80 mg  80 mg Oral QHS    lisinopril (PRINIVIL, ZESTRIL) tablet 40 mg  40 mg Oral DAILY    sodium chloride (NS) flush 5-40 mL  5-40 mL IntraVENous PRN    ondansetron (ZOFRAN) injection 4 mg  4 mg IntraVENous Q4H PRN    aspirin chewable tablet 81 mg  81 mg Oral DAILY    enoxaparin (LOVENOX) injection 40 mg  40 mg SubCUTAneous Q24H    dextrose 40% (GLUTOSE) oral gel 1 Tube  15 g Oral PRN    glucagon (GLUCAGEN) injection 1 mg  1 mg IntraMUSCular PRN    dextrose (D50W) injection syrg 12.5-25 g  25-50 mL IntraVENous PRN       Diet:  DIET NUTRITIONAL SUPPLEMENTS  DIET DIABETIC CONSISTENT CARB    Other Studies (last 24 hours):  No results found.     Assessment and Plan: Hospital Problems as of 5/9/2020 Date Reviewed: 3/3/2017          Codes Class Noted - Resolved POA    Hypomagnesemia ICD-10-CM: E83.42  ICD-9-CM: 275.2  3/7/2020 - Present Unknown        PFO (patent foramen ovale) ICD-10-CM: Q21.1  ICD-9-CM: 745.5  12/17/2019 - Present Yes        Arrhythmia ICD-10-CM: I49.9  ICD-9-CM: 427.9  12/15/2019 - Present Yes        Hyperlipemia ICD-10-CM: E78.5  ICD-9-CM: 272.4  12/15/2019 - Present Yes        * (Principal) Acute embolic stroke ([facility] [address] GHX-51-GE: I63.9  ICD-9-CM: 434.11  12/12/2019 - Present Yes        Hypertension (Chronic) ICD-10-CM: I10  ICD-9-CM: 401.9  Unknown - Present Yes        Type 2 diabetes mellitus (HCC) (Chronic) ICD-10-CM: E11.9  ICD-9-CM: 250.00  Unknown - Present Yes              A/P:    Acute embolic stroke  MRI brain showed acute to early subacute infarcts in the left cerebellar hemisphere, in the periventricular deep left frontoparietal white matter and likely additional areas of restricted diffusion in the right paramedian yariel. +PFO on echo  On ASA, statin  Per PT recommendations, orthotist consult for AFO for L LE. Will need shunt closure once discharged-  4/30-I spoke with cardiology, they state PFO closure can wait until patient discharged to a facility.     Hypomagnesemia  Resolved       Hypertension  Continue metoprolol and ACE inhibitor  Goal BP <130/80     Type 2 diabetes mellitus:    Monitor, BGL controlled        Signed:  Angelia Matias NP

## 2020-05-09 NOTE — PROGRESS NOTES
Problem: Patient Education: Go to Patient Education Activity  Goal: Patient/Family Education  Outcome: Progressing Towards Goal     Problem: Pressure Injury - Risk of  Goal: *Prevention of pressure injury  Description: Document Dewey Scale and appropriate interventions in the flowsheet. Outcome: Progressing Towards Goal  Note: Pressure Injury Interventions:  Sensory Interventions: Assess changes in LOC, Avoid rigorous massage over bony prominences    Moisture Interventions: Absorbent underpads, Check for incontinence Q2 hours and as needed    Activity Interventions: Increase time out of bed, Pressure redistribution bed/mattress(bed type), PT/OT evaluation    Mobility Interventions: HOB 30 degrees or less, Pressure redistribution bed/mattress (bed type), PT/OT evaluation    Nutrition Interventions: Document food/fluid/supplement intake, Offer support with meals,snacks and hydration    Friction and Shear Interventions: HOB 30 degrees or less                Problem: Patient Education: Go to Patient Education Activity  Goal: Patient/Family Education  Outcome: Progressing Towards Goal     Problem: Falls - Risk of  Goal: *Absence of Falls  Description: Document Jeanne Fall Risk and appropriate interventions in the flowsheet.   Outcome: Progressing Towards Goal  Note: Fall Risk Interventions:  Mobility Interventions: Bed/chair exit alarm, Communicate number of staff needed for ambulation/transfer, Patient to call before getting OOB    Mentation Interventions: Door open when patient unattended    Medication Interventions: Bed/chair exit alarm, Patient to call before getting OOB, Teach patient to arise slowly    Elimination Interventions: Bed/chair exit alarm, Call light in reach, Patient to call for help with toileting needs, Stay With Me (per policy), Toilet paper/wipes in reach, Toileting schedule/hourly rounds    History of Falls Interventions: Bed/chair exit alarm, Consult care management for discharge planning, Door open when patient unattended, Evaluate medications/consider consulting pharmacy, Investigate reason for fall, Room close to nurse's station         Problem: Patient Education: Go to Patient Education Activity  Goal: Patient/Family Education  Outcome: Progressing Towards Goal     Problem: Diabetes Self-Management  Goal: *Disease process and treatment process  Description: Define diabetes and identify own type of diabetes; list 3 options for treating diabetes. Outcome: Progressing Towards Goal  Goal: *Incorporating nutritional management into lifestyle  Description: Describe effect of type, amount and timing of food on blood glucose; list 3 methods for planning meals. Outcome: Progressing Towards Goal  Goal: *Incorporating physical activity into lifestyle  Description: State effect of exercise on blood glucose levels. Outcome: Progressing Towards Goal  Goal: *Developing strategies to promote health/change behavior  Description: Define the ABC's of diabetes; identify appropriate screenings, schedule and personal plan for screenings. Outcome: Progressing Towards Goal  Goal: *Using medications safely  Description: State effect of diabetes medications on diabetes; name diabetes medication taking, action and side effects. Outcome: Progressing Towards Goal  Goal: *Monitoring blood glucose, interpreting and using results  Description: Identify recommended blood glucose targets  and personal targets. Outcome: Progressing Towards Goal  Goal: *Prevention, detection, treatment of acute complications  Description: List symptoms of hyper- and hypoglycemia; describe how to treat low blood sugar and actions for lowering  high blood glucose level. Outcome: Progressing Towards Goal  Goal: *Prevention, detection and treatment of chronic complications  Description: Define the natural course of diabetes and describe the relationship of blood glucose levels to long term complications of diabetes.   Outcome: Progressing Towards Goal  Goal: *Developing strategies to address psychosocial issues  Description: Describe feelings about living with diabetes; identify support needed and support network  Outcome: Progressing Towards Goal     Problem: Patient Education: Go to Patient Education Activity  Goal: Patient/Family Education  Outcome: Progressing Towards Goal     Problem: Patient Education: Go to Patient Education Activity  Goal: Patient/Family Education  Outcome: Progressing Towards Goal     Problem: Nutrition Deficit  Goal: *Optimize nutritional status  Outcome: Progressing Towards Goal     Problem: Patient Education: Go to Patient Education Activity  Goal: Patient/Family Education  Outcome: Progressing Towards Goal     Problem: General Medical Care Plan  Goal: *Vital signs within specified parameters  Outcome: Progressing Towards Goal  Goal: *Labs within defined limits  Outcome: Progressing Towards Goal  Goal: *Absence of infection signs and symptoms  Description: Wash hand more often   Outcome: Progressing Towards Goal  Goal: *Optimal pain control at patient's stated goal  Outcome: Progressing Towards Goal  Goal: *Skin integrity maintained  Outcome: Progressing Towards Goal  Goal: *Fluid volume balance  Outcome: Progressing Towards Goal  Goal: *Optimize nutritional status  Outcome: Progressing Towards Goal  Goal: *Anxiety reduced or absent  Outcome: Progressing Towards Goal  Goal: *Progressive mobility and function (eg: ADL's)  Outcome: Progressing Towards Goal     Problem: Patient Education: Go to Patient Education Activity  Goal: Patient/Family Education  Outcome: Progressing Towards Goal     Problem: Patient Education: Go to Patient Education Activity  Goal: Patient/Family Education  Outcome: Progressing Towards Goal     Problem: Patient Education: Go to Patient Education Activity  Goal: Patient/Family Education  Outcome: Progressing Towards Goal     Problem: Patient Education: Go to Patient Education Activity  Goal: Patient/Family Education  Outcome: Progressing Towards Goal     Problem: Ischemic Stroke: Discharge Outcomes  Goal: *Verbalizes anxiety and depression are reduced or absent  Outcome: Progressing Towards Goal  Goal: *Verbalize understanding of risk factor modification(Stroke Metric)  Outcome: Progressing Towards Goal  Goal: *Hemodynamically stable  Outcome: Progressing Towards Goal  Goal: *Absence of aspiration pneumonia  Outcome: Progressing Towards Goal  Goal: *Aware of needed dietary changes  Outcome: Progressing Towards Goal  Goal: *Verbalize understanding of prescribed medications including anti-coagulants, anti-lipid, and/or anti-platelets(Stroke Metric)  Outcome: Progressing Towards Goal  Goal: *Tolerating diet  Outcome: Progressing Towards Goal  Goal: *Aware of follow-up diagnostics related to anticoagulants  Outcome: Progressing Towards Goal  Goal: *Ability to perform ADLs and demonstrates progressive mobility and function  Outcome: Progressing Towards Goal  Goal: *Absence of DVT(Stroke Metric)  Outcome: Progressing Towards Goal  Goal: *Absence of aspiration  Outcome: Progressing Towards Goal  Goal: *Optimal pain control at patient's stated goal  Outcome: Progressing Towards Goal  Goal: *Home safety concerns addressed  Outcome: Progressing Towards Goal  Goal: *Describes available resources and support systems  Outcome: Progressing Towards Goal  Goal: *Verbalizes understanding of activation of EMS(911) for stroke symptoms(Stroke Metric)  Outcome: Progressing Towards Goal  Goal: *Understands and describes signs and symptoms to report to providers(Stroke Metric)  Outcome: Progressing Towards Goal  Goal: *Neurolgocially stable (absence of additional neurological deficits)  Outcome: Progressing Towards Goal  Goal: *Verbalizes importance of follow-up with primary care physician(Stroke Metric)  Outcome: Progressing Towards Goal  Goal: *Smoking cessation discussed,if applicable(Stroke Metric)  Outcome: Progressing Towards Goal  Goal: *Depression screening completed(Stroke Metric)  Outcome: Progressing Towards Goal     Problem: Patient Education: Go to Patient Education Activity  Goal: Patient/Family Education  Outcome: Progressing Towards Goal

## 2020-05-09 NOTE — PROGRESS NOTES
Hospitalist Progress Note    Subjective:   Daily Progress Note: 5/8/2020 6399    Patient admitted 12/12/19 with acute right side embolic stroke with left side residual weakness and left facial droop. CT brain on admission with indeterminate infarctions within the left corona radiata/caudate.  CTA of the head and neck with stenosis at the distal segment of the right  Vertebral artery and multifocal stenosis in the P2 segment of the left posterior cerebral artery.  MRI brain with acute to early subacute infarcts in the left cerebellar hemisphere in the periventricular deep left frontoparietal white matter and likely additional areas of restricted diffusion in the right paramedian yariel. Echo with + PFO, on statin and ASA.  Will need event monitor on discharge and if no arrhythmia, PFO closure recommended.  Wife informs CM she does not want patient at home, they were planning to separate prior to this stroke.  CM attempting  to place in STR, medicaid pending.    4/9:  Speech remains slightly slurred, SLP signing off as clinical indication no longer needed.    4/10: Mag critically low today: 1.3. Replaced   4/14:  Continues to wait for placement.  Good participation with PT/OT. Nagi Challenger a little use of right hand, still unable to .    5/2:  Fitted for AFO today per orthotist.  No complaints.  Continued wait for medicaid and SSI approval.  Metformin dosing adjusted a few days ago.  Glucose 118-126 x 24 hours.  CM spoke with wife, still does not want patient to come home, even if he has to go to a shelter.    5/4:  DM management in for teaching and medication recommendations. Patient attempting to use left hand for glucometer, remains weak.    5/7:  Continues to wait for placement. Needs 4 week event monitor on discharge. Continues to participate with PT.    5/8:  Slept most of day. Ambulated 150 ft with PT. Usual good spirits.      Current Facility-Administered Medications   Medication Dose Route Frequency    magnesium oxide (MAG-OX) tablet 400 mg  400 mg Oral BID    metFORMIN (GLUCOPHAGE) tablet 500 mg  500 mg Oral BID WITH MEALS    acetaminophen (TYLENOL) tablet 650 mg  650 mg Oral Q4H PRN    diphenhydrAMINE (BENADRYL) capsule 25 mg  25 mg Oral Q6H PRN    acetaminophen (TYLENOL) tablet 650 mg  650 mg Oral Q8H    lip protectant (BLISTEX) ointment 1 Each  1 Each Topical PRN    metoprolol succinate (TOPROL-XL) XL tablet 25 mg  25 mg Oral DAILY    polyethylene glycol (MIRALAX) packet 17 g  17 g Oral DAILY PRN    guaiFENesin (ROBITUSSIN) 100 mg/5 mL oral liquid 100 mg  100 mg Oral Q4H PRN    hydrALAZINE (APRESOLINE) 20 mg/mL injection 20 mg  20 mg IntraVENous Q4H PRN    atorvastatin (LIPITOR) tablet 80 mg  80 mg Oral QHS    lisinopril (PRINIVIL, ZESTRIL) tablet 40 mg  40 mg Oral DAILY    sodium chloride (NS) flush 5-40 mL  5-40 mL IntraVENous PRN    ondansetron (ZOFRAN) injection 4 mg  4 mg IntraVENous Q4H PRN    aspirin chewable tablet 81 mg  81 mg Oral DAILY    enoxaparin (LOVENOX) injection 40 mg  40 mg SubCUTAneous Q24H    dextrose 40% (GLUTOSE) oral gel 1 Tube  15 g Oral PRN    glucagon (GLUCAGEN) injection 1 mg  1 mg IntraMUSCular PRN    dextrose (D50W) injection syrg 12.5-25 g  25-50 mL IntraVENous PRN        Review of Systems  A comprehensive review of systems was negative except for that written in the HPI. Objective:     Visit Vitals  /82 (BP 1 Location: Right arm, BP Patient Position: At rest)   Pulse 76   Temp 97.5 °F (36.4 °C)   Resp 17   Ht 5' 6\" (1.676 m)   Wt 79.8 kg (175 lb 14.4 oz)   SpO2 99%   BMI 28.39 kg/m²      O2 Device: Room air    Temp (24hrs), Av.8 °F (36.6 °C), Min:97.5 °F (36.4 °C), Max:98.3 °F (36.8 °C)     General appearance: Oriented and alert, cooperative, denies need or new issues.  In good spirits.    Head: Normocephalic, without obvious abnormality, atraumatic  Eyes: conjunctivae/corneas clear.  PERRL  Throat: Continued mild left facial weakness.  Lips, mucosa, and tongue normal. Teeth and gums normal  Neck: supple, symmetrical, trachea midline, no JVD  Lungs: clear to auscultation bilaterally  Heart: regular rate and rhythm, S1, S2 normal, no murmur, click, rub or gallop  Abdomen: soft, non-tender. Bowel sounds normal. No masses,  no organomegaly  Extremities: Persistent left side upper and lower residual weakness, improved since admission. Skin: Skin color, texture, turgor normal. No rashes or lesions  Neurologic: As above.     Additional comments: Notes,orders, test results, vitals reviewed    Data Review:  Last labs:    Ref. Range 5/4/2020 04:08 5/6/2020 06:34   WBC Latest Ref Range: 4.3 - 11.1 K/uL 5.9     RBC Latest Ref Range: 4.23 - 5.6 M/uL 4.06 (L)     HGB Latest Ref Range: 13.6 - 17.2 g/dL 11.0 (L)     HCT Latest Ref Range: 41.1 - 50.3 % 34.2 (L)     MCV Latest Ref Range: 79.6 - 97.8 FL 84.2     MCH Latest Ref Range: 26.1 - 32.9 PG 27.1     MCHC Latest Ref Range: 31.4 - 35.0 g/dL 32.2     RDW Latest Ref Range: 11.9 - 14.6 % 16.2 (H)     PLATELET Latest Ref Range: 150 - 450 K/uL 187     MPV Latest Ref Range: 9.4 - 12.3 FL 11.9     NEUTROPHILS Latest Ref Range: 43 - 78 % 50     LYMPHOCYTES Latest Ref Range: 13 - 44 % 43     MONOCYTES Latest Ref Range: 4.0 - 12.0 % 6     EOSINOPHILS Latest Ref Range: 0.5 - 7.8 % 1     BASOPHILS Latest Ref Range: 0.0 - 2.0 % 0     IMMATURE GRANULOCYTES Latest Ref Range: 0.0 - 5.0 % 0     DF Latest Units:   AUTOMATED     ABSOLUTE NRBC Latest Ref Range: 0.0 - 0.2 K/uL 0.00     ABS. NEUTROPHILS Latest Ref Range: 1.7 - 8.2 K/UL 2.9     ABS. IMM. GRANS. Latest Ref Range: 0.0 - 0.5 K/UL 0.0     ABS. LYMPHOCYTES Latest Ref Range: 0.5 - 4.6 K/UL 2.5     ABS. MONOCYTES Latest Ref Range: 0.1 - 1.3 K/UL 0.3     ABS. EOSINOPHILS Latest Ref Range: 0.0 - 0.8 K/UL 0.1     ABS.  BASOPHILS Latest Ref Range: 0.0 - 0.2 K/UL 0.0     Sodium Latest Ref Range: 136 - 145 mmol/L 141     Potassium Latest Ref Range: 3.5 - 5.1 mmol/L 4.2   Chloride Latest Ref Range: 98 - 107 mmol/L 108 (H)     CO2 Latest Ref Range: 21 - 32 mmol/L 25     Anion gap Latest Ref Range: 7 - 16 mmol/L 8     Glucose Latest Ref Range: 65 - 100 mg/dL 95     BUN Latest Ref Range: 6 - 23 MG/DL 18     Creatinine Latest Ref Range: 0.8 - 1.5 MG/DL 0.96     Calcium Latest Ref Range: 8.3 - 10.4 MG/DL 9.5     Magnesium Latest Ref Range: 1.8 - 2.4 mg/dL   1.8   GFR est non-AA Latest Ref Range: >60 ml/min/1.73m2 >60     GFR est AA Latest Ref Range: >60 ml/min/1.73m2 >60     Bilirubin, total Latest Ref Range: 0.2 - 1.1 MG/DL 0.4     Protein, total Latest Ref Range: 6.3 - 8.2 g/dL 6.5     Albumin Latest Ref Range: 3.5 - 5.0 g/dL 2.7 (L)     Globulin Latest Ref Range: 2.3 - 3.5 g/dL 3.8 (H)     A-G Ratio Latest Ref Range: 1.2 - 3.5   0.7 (L)     ALT (SGPT) Latest Ref Range: 12 - 65 U/L 18     AST Latest Ref Range: 15 - 37 U/L 16     Alk. phosphatase Latest Ref Range: 50 - 136 U/L 68         12/12/19:  CT HEAD:  Age indeterminate infarctions within the left corona radiata/caudate. Mild chronic small vessel ischemic changes and areas of remote infarction as described.  Probable partial volume averaging the region of the left thalamus rather than intraparenchymal hemorrhage.     12/12/19:  CTA HEAD AND NECK:      Stenosis at the distal segment of the right vertebral artery and multifocal  stenosis in the P2 segment of the left posterior cerebral artery.     12/12/19:  MRI BRAIN:  Acute to early subacute infarcts in the left cerebellar hemisphere, in the periventricular deep left frontoparietal white matter and likely additional areas of restricted diffusion in the right paramedian yariel.  Old lacunar infarcts in the corpus striatum and periventricular white matter as well as within the left corona radiata.     1/8/20:  SWALLOW FUNCTION VIDEO: Small quantity aspiration with straw sips of thin liquids.      Assessment/Plan:   Acute to early subacute infarcts in the left cerebellar hemisphere, in the periventricular deep left frontoparietal white matter and likely additional areas of restricted diffusion in the right paramedian yariel.  Old lacunar infarcts also.                  Making good progress with PT/OT                Speech and swallowing improved to the  extent SLP  signed off                 Pending placement after approved for  SSI and medicaid     Dysarthria due to stroke:  Improved immensely              SLP releasing 4/14      Difficult placement with marital discord prior to stroke:  Wife does not want him at home:  CM working on tirelessly              Now issues obtaining MR for [employer]               CM spoke with wife 5/1, she still does not  want patient to come home.      Hypertension:  Continue lisinopril, toprol XL     IDDM II:  Continue glucophage, diabetic diet.  A1C: 8.4, rechecking               adding SSI 4/15              DM education and  management continues to follow  intermittently,  increased glucophage  frequency 4/30              Discontinue SSI      Arrhythmia:  On beta blocker     PFO 12/17/2019:                Will need 4 week event monitor on  discharge               Will need PFO closure at some point  after discharge per Cards      Hypomagnesemia:  Replace and recheck               Increasing daily 400 mg dose to bid 5/2.              Recheck mag 5/5/20: 1.8       Plan discussed with RN, Patient     Signed By: Torri Ta NP     May 8, 2020

## 2020-05-10 PROCEDURE — 74011250637 HC RX REV CODE- 250/637: Performed by: HOSPITALIST

## 2020-05-10 PROCEDURE — 74011250636 HC RX REV CODE- 250/636: Performed by: INTERNAL MEDICINE

## 2020-05-10 PROCEDURE — 74011250637 HC RX REV CODE- 250/637: Performed by: INTERNAL MEDICINE

## 2020-05-10 PROCEDURE — 74011250637 HC RX REV CODE- 250/637: Performed by: NURSE PRACTITIONER

## 2020-05-10 PROCEDURE — 65270000029 HC RM PRIVATE

## 2020-05-10 RX ADMIN — ATORVASTATIN CALCIUM 80 MG: 80 TABLET, FILM COATED ORAL at 20:59

## 2020-05-10 RX ADMIN — METFORMIN HYDROCHLORIDE 500 MG: 500 TABLET ORAL at 08:40

## 2020-05-10 RX ADMIN — ASPIRIN 81 MG 81 MG: 81 TABLET ORAL at 08:40

## 2020-05-10 RX ADMIN — Medication 400 MG: at 08:40

## 2020-05-10 RX ADMIN — GUAIFENESIN 100 MG: 100 SOLUTION ORAL at 17:24

## 2020-05-10 RX ADMIN — DIPHENHYDRAMINE HYDROCHLORIDE 25 MG: 25 CAPSULE ORAL at 17:24

## 2020-05-10 RX ADMIN — LISINOPRIL 40 MG: 20 TABLET ORAL at 08:40

## 2020-05-10 RX ADMIN — ENOXAPARIN SODIUM 40 MG: 40 INJECTION SUBCUTANEOUS at 13:39

## 2020-05-10 RX ADMIN — ACETAMINOPHEN 650 MG: 325 TABLET, FILM COATED ORAL at 21:00

## 2020-05-10 RX ADMIN — METFORMIN HYDROCHLORIDE 500 MG: 500 TABLET ORAL at 17:24

## 2020-05-10 RX ADMIN — ACETAMINOPHEN 650 MG: 325 TABLET, FILM COATED ORAL at 05:45

## 2020-05-10 RX ADMIN — ACETAMINOPHEN 650 MG: 325 TABLET, FILM COATED ORAL at 13:39

## 2020-05-10 RX ADMIN — Medication 400 MG: at 17:24

## 2020-05-10 RX ADMIN — METOPROLOL SUCCINATE 25 MG: 25 TABLET, FILM COATED, EXTENDED RELEASE ORAL at 08:40

## 2020-05-10 NOTE — PROGRESS NOTES
Hospitalist Progress Note     Admit Date:  2019  9:07 AM   Name:  Priti Kelly   Age:  53 y.o.  :  1975   MRN:  [id number]   PCP:  Maite Espinoza MD  Treatment Team: Attending Provider: Raz Crouch MD; Care Manager: Royce Ramirez, [facility]; Utilization Review: Mayuri Alonso RN; Nurse Practitioner: Aurora Trujillo NP; Care Manager: Doug Mancuso RN; Hospitalist: Renetta Boone NP; Utilization Review: Edwin Fernandez RN; Charge Nurse: Yves Dooley    Subjective:   HPI and or CC:  54 yo AA male with PMH of DM, HTN admitted  for CVA affecting numerous areas of the brain. He was placed on ASA and statin. He has remained alert and oriented x3. Some movement to right side but unable to grasp with right hand. He is currently waiting on placement and this has remained difficult due to waiting on Medicaid. 5/10:  Alert and oriented x3. Voices no complaints today. Continues to wait for placement. Continues to wait on SSI and Medicaid approval.  He remains afebrile. Objective:     Patient Vitals for the past 24 hrs:   Temp Pulse Resp BP SpO2   05/10/20 0806 97.9 °F (36.6 °C) 69 17 133/77 97 %   05/10/20 0436 97.9 °F (36.6 °C) 73 16 129/80 98 %   05/10/20 0041 97.8 °F (36.6 °C) 72 18 149/89 97 %   20 1956 98.3 °F (36.8 °C) 85 16 122/81 96 %   20 1600 97.6 °F (36.4 °C) 87 18 131/81 100 %   20 1200 97.5 °F (36.4 °C) 88 18 126/83 98 %     Oxygen Therapy  O2 Sat (%): 97 % (05/10/20 0806)  Pulse via Oximetry: 75 beats per minute (20 0400)  O2 Device: Room air (20 1648)  No intake or output data in the 24 hours ending 05/10/20 0907      REVIEW OF SYSTEMS: Comprehensive ROS performed and negative except as stated in HPI. Physical Examination:  General:       No acute distress    Lungs:          CTA bilaterally. Resp even and nonlabored  Heart:            S1S2 present without murmurs rubs gallops. RRR.  No LE edema  Abdomen:    Soft, non tender, non distended. BS present  Extremities: No cyanosis. Left sided hemiparesis  Neurologic:  moves right hand and raises arm at elbow moderately, unable to squeeze my fingers left hand, mildly slurred speech.  PERRLA, strength 4/5 RUE/RLE. Data Review:  I have reviewed all labs, meds, telemetry events, and studies from the last 24 hours. No results found for this or any previous visit (from the past 24 hour(s)). All Micro Results     None          Current Meds:  Current Facility-Administered Medications   Medication Dose Route Frequency    magnesium oxide (MAG-OX) tablet 400 mg  400 mg Oral BID    metFORMIN (GLUCOPHAGE) tablet 500 mg  500 mg Oral BID WITH MEALS    acetaminophen (TYLENOL) tablet 650 mg  650 mg Oral Q4H PRN    diphenhydrAMINE (BENADRYL) capsule 25 mg  25 mg Oral Q6H PRN    acetaminophen (TYLENOL) tablet 650 mg  650 mg Oral Q8H    lip protectant (BLISTEX) ointment 1 Each  1 Each Topical PRN    metoprolol succinate (TOPROL-XL) XL tablet 25 mg  25 mg Oral DAILY    polyethylene glycol (MIRALAX) packet 17 g  17 g Oral DAILY PRN    guaiFENesin (ROBITUSSIN) 100 mg/5 mL oral liquid 100 mg  100 mg Oral Q4H PRN    hydrALAZINE (APRESOLINE) 20 mg/mL injection 20 mg  20 mg IntraVENous Q4H PRN    atorvastatin (LIPITOR) tablet 80 mg  80 mg Oral QHS    lisinopril (PRINIVIL, ZESTRIL) tablet 40 mg  40 mg Oral DAILY    sodium chloride (NS) flush 5-40 mL  5-40 mL IntraVENous PRN    ondansetron (ZOFRAN) injection 4 mg  4 mg IntraVENous Q4H PRN    aspirin chewable tablet 81 mg  81 mg Oral DAILY    enoxaparin (LOVENOX) injection 40 mg  40 mg SubCUTAneous Q24H    dextrose 40% (GLUTOSE) oral gel 1 Tube  15 g Oral PRN    glucagon (GLUCAGEN) injection 1 mg  1 mg IntraMUSCular PRN    dextrose (D50W) injection syrg 12.5-25 g  25-50 mL IntraVENous PRN       Diet:  DIET NUTRITIONAL SUPPLEMENTS  DIET DIABETIC CONSISTENT CARB    Other Studies (last 24 hours):  No results found.     Assessment and Plan:     Hospital Problems as of 5/10/2020 Date Reviewed: 3/3/2017          Codes Class Noted - Resolved POA    Hypomagnesemia ICD-10-CM: E83.42  ICD-9-CM: 275.2  3/7/2020 - Present Unknown        PFO (patent foramen ovale) ICD-10-CM: Q21.1  ICD-9-CM: 745.5  12/17/2019 - Present Yes        Arrhythmia ICD-10-CM: I49.9  ICD-9-CM: 427.9  12/15/2019 - Present Yes        Hyperlipemia ICD-10-CM: E78.5  ICD-9-CM: 272.4  12/15/2019 - Present Yes        * (Principal) Acute embolic stroke ([facility] [address] S-81-AJ: I63.9  ICD-9-CM: 434.11  12/12/2019 - Present Yes        Hypertension (Chronic) ICD-10-CM: I10  ICD-9-CM: 401.9  Unknown - Present Yes        Type 2 diabetes mellitus (HCC) (Chronic) ICD-10-CM: E11.9  ICD-9-CM: 250.00  Unknown - Present Yes              A/P:    Acute embolic stroke  MRI brain showed acute to early subacute infarcts in the left cerebellar hemisphere, in the periventricular deep left frontoparietal white matter and likely additional areas of restricted diffusion in the right paramedian yariel. +PFO on echo  On ASA, statin  Per PT recommendations, orthotist consult for AFO for L LE. Will need shunt closure once discharged-  4/30-I spoke with cardiology, they state PFO closure can wait until patient discharged to a facility.     Hypomagnesemia  Resolved       Hypertension  Continue metoprolol and ACE inhibitor  Goal BP <130/80     Type 2 diabetes mellitus:    Monitor, BGL controlled        Signed:  Gwendloyn Oppenheim, NP

## 2020-05-10 NOTE — PROGRESS NOTES
Problem: Patient Education: Go to Patient Education Activity  Goal: Patient/Family Education  Outcome: Progressing Towards Goal     Problem: Pressure Injury - Risk of  Goal: *Prevention of pressure injury  Description: Document Dewey Scale and appropriate interventions in the flowsheet. Outcome: Progressing Towards Goal  Note: Pressure Injury Interventions:  Sensory Interventions: Assess changes in LOC, Avoid rigorous massage over bony prominences    Moisture Interventions: Absorbent underpads, Check for incontinence Q2 hours and as needed    Activity Interventions: Increase time out of bed, Pressure redistribution bed/mattress(bed type), PT/OT evaluation    Mobility Interventions: HOB 30 degrees or less, Pressure redistribution bed/mattress (bed type), PT/OT evaluation    Nutrition Interventions: Document food/fluid/supplement intake, Offer support with meals,snacks and hydration    Friction and Shear Interventions: HOB 30 degrees or less                Problem: Patient Education: Go to Patient Education Activity  Goal: Patient/Family Education  Outcome: Progressing Towards Goal     Problem: Falls - Risk of  Goal: *Absence of Falls  Description: Document Jeanne Fall Risk and appropriate interventions in the flowsheet.   Outcome: Progressing Towards Goal  Note: Fall Risk Interventions:  Mobility Interventions: Bed/chair exit alarm, Communicate number of staff needed for ambulation/transfer, Patient to call before getting OOB    Mentation Interventions: Door open when patient unattended, Increase mobility, Bed/chair exit alarm    Medication Interventions: Bed/chair exit alarm, Patient to call before getting OOB, Teach patient to arise slowly    Elimination Interventions: Bed/chair exit alarm, Call light in reach, Elevated toilet seat, Patient to call for help with toileting needs, Stay With Me (per policy), Toilet paper/wipes in reach, Toileting schedule/hourly rounds    History of Falls Interventions: Bed/chair exit alarm, Consult care management for discharge planning, Door open when patient unattended, Investigate reason for fall         Problem: Patient Education: Go to Patient Education Activity  Goal: Patient/Family Education  Outcome: Progressing Towards Goal     Problem: Diabetes Self-Management  Goal: *Disease process and treatment process  Description: Define diabetes and identify own type of diabetes; list 3 options for treating diabetes. Outcome: Progressing Towards Goal  Goal: *Incorporating nutritional management into lifestyle  Description: Describe effect of type, amount and timing of food on blood glucose; list 3 methods for planning meals. Outcome: Progressing Towards Goal  Goal: *Incorporating physical activity into lifestyle  Description: State effect of exercise on blood glucose levels. Outcome: Progressing Towards Goal  Goal: *Developing strategies to promote health/change behavior  Description: Define the ABC's of diabetes; identify appropriate screenings, schedule and personal plan for screenings. Outcome: Progressing Towards Goal  Goal: *Using medications safely  Description: State effect of diabetes medications on diabetes; name diabetes medication taking, action and side effects. Outcome: Progressing Towards Goal  Goal: *Monitoring blood glucose, interpreting and using results  Description: Identify recommended blood glucose targets  and personal targets. Outcome: Progressing Towards Goal  Goal: *Prevention, detection, treatment of acute complications  Description: List symptoms of hyper- and hypoglycemia; describe how to treat low blood sugar and actions for lowering  high blood glucose level. Outcome: Progressing Towards Goal  Goal: *Prevention, detection and treatment of chronic complications  Description: Define the natural course of diabetes and describe the relationship of blood glucose levels to long term complications of diabetes.   Outcome: Progressing Towards Goal  Goal: *Developing strategies to address psychosocial issues  Description: Describe feelings about living with diabetes; identify support needed and support network  Outcome: Progressing Towards Goal     Problem: Patient Education: Go to Patient Education Activity  Goal: Patient/Family Education  Outcome: Progressing Towards Goal     Problem: Patient Education: Go to Patient Education Activity  Goal: Patient/Family Education  Outcome: Progressing Towards Goal     Problem: Nutrition Deficit  Goal: *Optimize nutritional status  Outcome: Progressing Towards Goal     Problem: Patient Education: Go to Patient Education Activity  Goal: Patient/Family Education  Outcome: Progressing Towards Goal     Problem: General Medical Care Plan  Goal: *Vital signs within specified parameters  Outcome: Progressing Towards Goal  Goal: *Labs within defined limits  Outcome: Progressing Towards Goal  Goal: *Absence of infection signs and symptoms  Description: Wash hand more often   Outcome: Progressing Towards Goal  Goal: *Optimal pain control at patient's stated goal  Outcome: Progressing Towards Goal  Goal: *Skin integrity maintained  Outcome: Progressing Towards Goal  Goal: *Fluid volume balance  Outcome: Progressing Towards Goal  Goal: *Optimize nutritional status  Outcome: Progressing Towards Goal  Goal: *Anxiety reduced or absent  Outcome: Progressing Towards Goal  Goal: *Progressive mobility and function (eg: ADL's)  Outcome: Progressing Towards Goal     Problem: Patient Education: Go to Patient Education Activity  Goal: Patient/Family Education  Outcome: Progressing Towards Goal     Problem: Patient Education: Go to Patient Education Activity  Goal: Patient/Family Education  Outcome: Progressing Towards Goal     Problem: Patient Education: Go to Patient Education Activity  Goal: Patient/Family Education  Outcome: Progressing Towards Goal     Problem: Patient Education: Go to Patient Education Activity  Goal: Patient/Family Education  Outcome: Progressing Towards Goal     Problem: Ischemic Stroke: Discharge Outcomes  Goal: *Verbalizes anxiety and depression are reduced or absent  Outcome: Progressing Towards Goal  Goal: *Verbalize understanding of risk factor modification(Stroke Metric)  Outcome: Progressing Towards Goal  Goal: *Hemodynamically stable  Outcome: Progressing Towards Goal  Goal: *Absence of aspiration pneumonia  Outcome: Progressing Towards Goal  Goal: *Aware of needed dietary changes  Outcome: Progressing Towards Goal  Goal: *Verbalize understanding of prescribed medications including anti-coagulants, anti-lipid, and/or anti-platelets(Stroke Metric)  Outcome: Progressing Towards Goal  Goal: *Tolerating diet  Outcome: Progressing Towards Goal  Goal: *Aware of follow-up diagnostics related to anticoagulants  Outcome: Progressing Towards Goal  Goal: *Ability to perform ADLs and demonstrates progressive mobility and function  Outcome: Progressing Towards Goal  Goal: *Absence of DVT(Stroke Metric)  Outcome: Progressing Towards Goal  Goal: *Absence of aspiration  Outcome: Progressing Towards Goal  Goal: *Optimal pain control at patient's stated goal  Outcome: Progressing Towards Goal  Goal: *Home safety concerns addressed  Outcome: Progressing Towards Goal  Goal: *Describes available resources and support systems  Outcome: Progressing Towards Goal  Goal: *Verbalizes understanding of activation of EMS(911) for stroke symptoms(Stroke Metric)  Outcome: Progressing Towards Goal  Goal: *Understands and describes signs and symptoms to report to providers(Stroke Metric)  Outcome: Progressing Towards Goal  Goal: *Neurolgocially stable (absence of additional neurological deficits)  Outcome: Progressing Towards Goal  Goal: *Verbalizes importance of follow-up with primary care physician(Stroke Metric)  Outcome: Progressing Towards Goal  Goal: *Smoking cessation discussed,if applicable(Stroke Metric)  Outcome: Progressing Towards Goal  Goal: *Depression screening completed(Stroke Metric)  Outcome: Progressing Towards Goal     Problem: Patient Education: Go to Patient Education Activity  Goal: Patient/Family Education  Outcome: Progressing Towards Goal

## 2020-05-10 NOTE — PROGRESS NOTES
Reviewed notes for spiritual concerns prior to visit  Visit with patient to build rapport with . Calm  Encouraged with presence and words of hope    Patient demonstrates confidence in the care team.  Appears to be coping with illness.     No physical contact with patient per guidelines  Assurance of ongoing prayers      Spent time with him in conversation  Prayer for him and his family    Hopefully he will be able to move to rehab in near future      Lenka Wright,  Staff   C: [phone]  /  [email or web address]

## 2020-05-11 LAB — GLUCOSE BLD STRIP.AUTO-MCNC: 94 MG/DL (ref 65–100)

## 2020-05-11 PROCEDURE — 74011250637 HC RX REV CODE- 250/637: Performed by: INTERNAL MEDICINE

## 2020-05-11 PROCEDURE — 74011250637 HC RX REV CODE- 250/637: Performed by: HOSPITALIST

## 2020-05-11 PROCEDURE — 74011250637 HC RX REV CODE- 250/637: Performed by: NURSE PRACTITIONER

## 2020-05-11 PROCEDURE — 82962 GLUCOSE BLOOD TEST: CPT

## 2020-05-11 PROCEDURE — 65270000029 HC RM PRIVATE

## 2020-05-11 PROCEDURE — 97530 THERAPEUTIC ACTIVITIES: CPT

## 2020-05-11 PROCEDURE — 74011250636 HC RX REV CODE- 250/636: Performed by: INTERNAL MEDICINE

## 2020-05-11 RX ADMIN — ACETAMINOPHEN 650 MG: 325 TABLET, FILM COATED ORAL at 15:05

## 2020-05-11 RX ADMIN — LISINOPRIL 40 MG: 20 TABLET ORAL at 08:50

## 2020-05-11 RX ADMIN — ATORVASTATIN CALCIUM 80 MG: 80 TABLET, FILM COATED ORAL at 21:41

## 2020-05-11 RX ADMIN — POLYETHYLENE GLYCOL 3350 17 G: 17 POWDER, FOR SOLUTION ORAL at 05:39

## 2020-05-11 RX ADMIN — METFORMIN HYDROCHLORIDE 500 MG: 500 TABLET ORAL at 08:49

## 2020-05-11 RX ADMIN — Medication 5 ML: at 15:05

## 2020-05-11 RX ADMIN — Medication 400 MG: at 08:49

## 2020-05-11 RX ADMIN — DIPHENHYDRAMINE HYDROCHLORIDE 25 MG: 25 CAPSULE ORAL at 18:03

## 2020-05-11 RX ADMIN — ACETAMINOPHEN 650 MG: 325 TABLET, FILM COATED ORAL at 21:41

## 2020-05-11 RX ADMIN — GUAIFENESIN 100 MG: 100 SOLUTION ORAL at 18:03

## 2020-05-11 RX ADMIN — ASPIRIN 81 MG 81 MG: 81 TABLET ORAL at 08:50

## 2020-05-11 RX ADMIN — ACETAMINOPHEN 650 MG: 325 TABLET, FILM COATED ORAL at 05:38

## 2020-05-11 RX ADMIN — METFORMIN HYDROCHLORIDE 500 MG: 500 TABLET ORAL at 18:03

## 2020-05-11 RX ADMIN — ENOXAPARIN SODIUM 40 MG: 40 INJECTION SUBCUTANEOUS at 15:05

## 2020-05-11 RX ADMIN — METOPROLOL SUCCINATE 25 MG: 25 TABLET, FILM COATED, EXTENDED RELEASE ORAL at 08:50

## 2020-05-11 RX ADMIN — Medication 400 MG: at 18:03

## 2020-05-11 NOTE — PROGRESS NOTES
Problem: Patient Education: Go to Patient Education Activity  Goal: Patient/Family Education  Outcome: Progressing Towards Goal     Problem: Pressure Injury - Risk of  Goal: *Prevention of pressure injury  Description: Document Dewey Scale and appropriate interventions in the flowsheet. Outcome: Progressing Towards Goal  Note: Pressure Injury Interventions:  Sensory Interventions: Assess changes in LOC, Avoid rigorous massage over bony prominences    Moisture Interventions: Absorbent underpads, Check for incontinence Q2 hours and as needed, Limit adult briefs, Minimize layers    Activity Interventions: Increase time out of bed, Pressure redistribution bed/mattress(bed type), PT/OT evaluation    Mobility Interventions: HOB 30 degrees or less, Pressure redistribution bed/mattress (bed type), PT/OT evaluation    Nutrition Interventions: Document food/fluid/supplement intake    Friction and Shear Interventions: HOB 30 degrees or less                Problem: Patient Education: Go to Patient Education Activity  Goal: Patient/Family Education  Outcome: Progressing Towards Goal     Problem: Falls - Risk of  Goal: *Absence of Falls  Description: Document Jeanne Fall Risk and appropriate interventions in the flowsheet.   Outcome: Progressing Towards Goal  Note: Fall Risk Interventions:  Mobility Interventions: Bed/chair exit alarm, Communicate number of staff needed for ambulation/transfer, OT consult for ADLs    Mentation Interventions: Door open when patient unattended, Increase mobility, Bed/chair exit alarm    Medication Interventions: Bed/chair exit alarm    Elimination Interventions: Bed/chair exit alarm, Call light in reach, Patient to call for help with toileting needs, Stay With Me (per policy), Toilet paper/wipes in reach, Toileting schedule/hourly rounds, Urinal in reach    History of Falls Interventions: Bed/chair exit alarm, Consult care management for discharge planning, Door open when patient unattended, Investigate reason for fall         Problem: Patient Education: Go to Patient Education Activity  Goal: Patient/Family Education  Outcome: Progressing Towards Goal     Problem: Diabetes Self-Management  Goal: *Disease process and treatment process  Description: Define diabetes and identify own type of diabetes; list 3 options for treating diabetes. Outcome: Progressing Towards Goal  Goal: *Incorporating nutritional management into lifestyle  Description: Describe effect of type, amount and timing of food on blood glucose; list 3 methods for planning meals. Outcome: Progressing Towards Goal  Goal: *Incorporating physical activity into lifestyle  Description: State effect of exercise on blood glucose levels. Outcome: Progressing Towards Goal  Goal: *Developing strategies to promote health/change behavior  Description: Define the ABC's of diabetes; identify appropriate screenings, schedule and personal plan for screenings. Outcome: Progressing Towards Goal  Goal: *Using medications safely  Description: State effect of diabetes medications on diabetes; name diabetes medication taking, action and side effects. Outcome: Progressing Towards Goal  Goal: *Monitoring blood glucose, interpreting and using results  Description: Identify recommended blood glucose targets  and personal targets. Outcome: Progressing Towards Goal  Goal: *Prevention, detection, treatment of acute complications  Description: List symptoms of hyper- and hypoglycemia; describe how to treat low blood sugar and actions for lowering  high blood glucose level. Outcome: Progressing Towards Goal  Goal: *Prevention, detection and treatment of chronic complications  Description: Define the natural course of diabetes and describe the relationship of blood glucose levels to long term complications of diabetes.   Outcome: Progressing Towards Goal  Goal: *Developing strategies to address psychosocial issues  Description: Describe feelings about living with diabetes; identify support needed and support network  Outcome: Progressing Towards Goal     Problem: Patient Education: Go to Patient Education Activity  Goal: Patient/Family Education  Outcome: Progressing Towards Goal     Problem: Patient Education: Go to Patient Education Activity  Goal: Patient/Family Education  Outcome: Progressing Towards Goal     Problem: Nutrition Deficit  Goal: *Optimize nutritional status  Outcome: Progressing Towards Goal     Problem: Patient Education: Go to Patient Education Activity  Goal: Patient/Family Education  Outcome: Progressing Towards Goal     Problem: General Medical Care Plan  Goal: *Vital signs within specified parameters  Outcome: Progressing Towards Goal  Goal: *Labs within defined limits  Outcome: Progressing Towards Goal  Goal: *Absence of infection signs and symptoms  Description: Wash hand more often   Outcome: Progressing Towards Goal  Goal: *Optimal pain control at patient's stated goal  Outcome: Progressing Towards Goal  Goal: *Skin integrity maintained  Outcome: Progressing Towards Goal  Goal: *Fluid volume balance  Outcome: Progressing Towards Goal  Goal: *Optimize nutritional status  Outcome: Progressing Towards Goal  Goal: *Anxiety reduced or absent  Outcome: Progressing Towards Goal  Goal: *Progressive mobility and function (eg: ADL's)  Outcome: Progressing Towards Goal     Problem: Patient Education: Go to Patient Education Activity  Goal: Patient/Family Education  Outcome: Progressing Towards Goal     Problem: Patient Education: Go to Patient Education Activity  Goal: Patient/Family Education  Outcome: Progressing Towards Goal     Problem: Patient Education: Go to Patient Education Activity  Goal: Patient/Family Education  Outcome: Progressing Towards Goal     Problem: Patient Education: Go to Patient Education Activity  Goal: Patient/Family Education  Outcome: Progressing Towards Goal     Problem: Ischemic Stroke: Discharge Outcomes  Goal: *[employer] anxiety and depression are reduced or absent  Outcome: Progressing Towards Goal  Goal: *Verbalize understanding of risk factor modification(Stroke Metric)  Outcome: Progressing Towards Goal  Goal: *Hemodynamically stable  Outcome: Progressing Towards Goal  Goal: *Absence of aspiration pneumonia  Outcome: Progressing Towards Goal  Goal: *Aware of needed dietary changes  Outcome: Progressing Towards Goal  Goal: *Verbalize understanding of prescribed medications including anti-coagulants, anti-lipid, and/or anti-platelets(Stroke Metric)  Outcome: Progressing Towards Goal  Goal: *Tolerating diet  Outcome: Progressing Towards Goal  Goal: *Aware of follow-up diagnostics related to anticoagulants  Outcome: Progressing Towards Goal  Goal: *Ability to perform ADLs and demonstrates progressive mobility and function  Outcome: Progressing Towards Goal  Goal: *Absence of DVT(Stroke Metric)  Outcome: Progressing Towards Goal  Goal: *Absence of aspiration  Outcome: Progressing Towards Goal  Goal: *Optimal pain control at patient's stated goal  Outcome: Progressing Towards Goal  Goal: *Home safety concerns addressed  Outcome: Progressing Towards Goal  Goal: *Describes available resources and support systems  Outcome: Progressing Towards Goal  Goal: *Verbalizes understanding of activation of EMS(911) for stroke symptoms(Stroke Metric)  Outcome: Progressing Towards Goal  Goal: *Understands and describes signs and symptoms to report to providers(Stroke Metric)  Outcome: Progressing Towards Goal  Goal: *Neurolgocially stable (absence of additional neurological deficits)  Outcome: Progressing Towards Goal  Goal: *Verbalizes importance of follow-up with primary care physician(Stroke Metric)  Outcome: Progressing Towards Goal  Goal: *Smoking cessation discussed,if applicable(Stroke Metric)  Outcome: Progressing Towards Goal  Goal: *Depression screening completed(Stroke Metric)  Outcome: Progressing Towards Goal     Problem: Pain  Goal: *Control of Pain  Outcome: Progressing Towards Goal     Problem: Patient Education: Go to Patient Education Activity  Goal: Patient/Family Education  Outcome: Progressing Towards Goal     Problem: Patient Education: Go to Patient Education Activity  Goal: Patient/Family Education  Outcome: Progressing Towards Goal

## 2020-05-11 NOTE — PROGRESS NOTES
Hospitalist Progress Note    Subjective:   Daily Progress Note: 5/11/2020 9:50 AM    Patient admitted 12/12/19 with acute right side embolic stroke with left side residual weakness and left facial droop. CT brain on admission with indeterminate infarctions within the left corona radiata/caudate.  CTA of the head and neck with stenosis at the distal segment of the right  Vertebral artery and multifocal stenosis in the P2 segment of the left posterior cerebral artery.  MRI brain with acute to early subacute infarcts in the left cerebellar hemisphere in the periventricular deep left frontoparietal white matter and likely additional areas of restricted diffusion in the right paramedian yariel. Echo with + PFO, on statin and ASA.  Will need event monitor on discharge and if no arrhythmia, PFO closure recommended.  Wife informs CM she does not want patient at home, they were planning to separate prior to this stroke.  CM attempting  to place in STR, medicaid pending.    4/9:  Speech remains slightly slurred, SLP signing off as clinical indication no longer needed.    4/10: Mag critically low today: 1.3. Replaced   4/14:  Continues to wait for placement.  Good participation with PT/OT. Lois Sorto a little use of right hand, still unable to .    5/2:  Fitted for AFO today per orthotist.  No complaints.  Continued wait for medicaid and SSI approval.  Metformin dosing adjusted a few days ago.  Glucose 118-126 x 24 hours.  CM spoke with wife, still does not want patient to come home, even if he has to go to a shelter.    5/4:  DM management in for teaching and medication recommendations.  Patient attempting to use left hand for glucometer, remains weak. 5/11:  Still waiting for disability rating, Medicaid. Leda Bethea this am.  Dietician in. Participating with PT/OT.      Current Facility-Administered Medications   Medication Dose Route Frequency    magnesium oxide (MAG-OX) tablet 400 mg  400 mg Oral BID    metFORMIN (GLUCOPHAGE) tablet 500 mg  500 mg Oral BID WITH MEALS    acetaminophen (TYLENOL) tablet 650 mg  650 mg Oral Q4H PRN    diphenhydrAMINE (BENADRYL) capsule 25 mg  25 mg Oral Q6H PRN    acetaminophen (TYLENOL) tablet 650 mg  650 mg Oral Q8H    lip protectant (BLISTEX) ointment 1 Each  1 Each Topical PRN    metoprolol succinate (TOPROL-XL) XL tablet 25 mg  25 mg Oral DAILY    polyethylene glycol (MIRALAX) packet 17 g  17 g Oral DAILY PRN    guaiFENesin (ROBITUSSIN) 100 mg/5 mL oral liquid 100 mg  100 mg Oral Q4H PRN    hydrALAZINE (APRESOLINE) 20 mg/mL injection 20 mg  20 mg IntraVENous Q4H PRN    atorvastatin (LIPITOR) tablet 80 mg  80 mg Oral QHS    lisinopril (PRINIVIL, ZESTRIL) tablet 40 mg  40 mg Oral DAILY    sodium chloride (NS) flush 5-40 mL  5-40 mL IntraVENous PRN    ondansetron (ZOFRAN) injection 4 mg  4 mg IntraVENous Q4H PRN    aspirin chewable tablet 81 mg  81 mg Oral DAILY    enoxaparin (LOVENOX) injection 40 mg  40 mg SubCUTAneous Q24H    dextrose 40% (GLUTOSE) oral gel 1 Tube  15 g Oral PRN    glucagon (GLUCAGEN) injection 1 mg  1 mg IntraMUSCular PRN    dextrose (D50W) injection syrg 12.5-25 g  25-50 mL IntraVENous PRN      Review of Systems  A comprehensive review of systems was negative except for that written in the HPI. Objective:     Visit Vitals  /88 (BP 1 Location: Right arm, BP Patient Position: At rest)   Pulse 75   Temp 97.6 °F (36.4 °C)   Resp 17   Ht 5' 6\" (1.676 m)   Wt 79.8 kg (175 lb 14.4 oz)   SpO2 97%   BMI 28.39 kg/m²       General appearance: Sleepy today. .  Oriented. Denies need or new issues.     Head: Normocephalic, without obvious abnormality, atraumatic  Eyes: conjunctivae/corneas clear.  PERRL  Throat: Continued mild left facial weakness.  Lips, mucosa, and tongue normal. Teeth and gums normal  Neck: supple, symmetrical, trachea midline, no JVD  Lungs: clear to auscultation bilaterally  Heart: regular rate and rhythm, S1, S2 normal, no murmur, click, rub or gallop  Abdomen: soft, non-tender. Bowel sounds normal. No masses,  no organomegaly  Extremities: Persistent left side upper and lower residual weakness, improved since admission.       Skin: Skin color, texture, turgor normal. No rashes or lesions  Neurologic: As above.     Additional comments: Notes,orders, test results, vitals reviewed    Data Review  Recent Results (from the past 24 hour(s))   GLUCOSE, POC    Collection Time: 20  6:28 AM   Result Value Ref Range    Glucose (POC) 94 65 - 100 mg/dL      :  Ma.8      19:  CT HEAD:  Age indeterminate infarctions within the left corona radiata/caudate. Mild chronic small vessel ischemic changes and areas of remote infarction as described.  Probable partial volume averaging the region of the left thalamus rather than intraparenchymal hemorrhage.     19:  CTA HEAD AND NECK:      Stenosis at the distal segment of the right vertebral artery and multifocal  stenosis in the P2 segment of the left posterior cerebral artery.     19:  MRI BRAIN:  Acute to early subacute infarcts in the left cerebellar hemisphere, in the periventricular deep left frontoparietal white matter and likely additional areas of restricted diffusion in the right paramedian yariel.  Old lacunar infarcts in the corpus striatum and periventricular white matter as well as within the left corona radiata.     20:  SWALLOW FUNCTION VIDEO: Small quantity aspiration with straw sips of thin liquids.      Assessment/Plan:   Acute to early subacute infarcts in the left cerebellar hemisphere, in the periventricular deep left frontoparietal white matter and likely additional areas of restricted diffusion in the right paramedian yariel.  Old lacunar infarcts also.                  Making good progress with PT/OT                Speech and swallowing improved to the       extent SLP  signed off                 Pending placement after approved for           SSI and medicaid: extended     Dysarthria due to stroke:  Improved immensely              SLP releasing 4/14      Difficult placement with marital discord prior to stroke:  Wife does not want him at home:  CM working on tirelessly              Now issues obtaining MR for [employer], needs SSI              CM spoke with wife 5/1, she still does not     want patient to come home.      Hypertension:  Continue lisinopril, toprol XL     IDDM II:  Continue glucophage, diabetic diet.  A1C: 8.4, rechecking               adding SSI 4/15              DM education and      management continues to follow        intermittently,  increased glucophage    frequency 4/30              Discontinue SSI      Arrhythmia:  On beta blocker     PFO 12/17/2019:                Will need 4 week event monitor on    discharge               Will need PFO closure at some point             after discharge per Cards      Hypomagnesemia:  Replace and recheck               Increasing daily 400 mg dose to bid 5/2.              Recheck mag 5/5/20: 1.8       Care Plan discussed with: Patient and Nurse    Signed By: Elsy Klein NP     May 11, 2020

## 2020-05-11 NOTE — PROGRESS NOTES
CM continues to follow to assist with dc planning. Pt's disability claim remains pending. Pt can not be approved for SNF medicaid until he is deemed disabled by [facility].

## 2020-05-11 NOTE — PROGRESS NOTES
Nutrition Assessment for:   Follow up    Assessment:   Pt is 54 yo male who presented with weakness, facial drooping and slurred speech. He has history of CVA, DM type 2, HTN, pancreatitis, GERD and a smoker. He remains hospitalized awaiting medicaid eligibility.    Eating lunch at my visit. Pt denies any difficulties with po intake. Reports he continues to eat well and drinks the glucerna. Last recorded BM 5/7. DIET NUTRITIONAL SUPPLEMENTS All Meals; Glucerna Shake  DIET DIABETIC CONSISTENT CARB Mechanical Soft    100% when recorded. Anthropometrics:  Height: 5' 6\" (167.6 cm), Weight: 95.3 kg (210 lb),  , Body mass index is 33.89 kg/m². BMI class of obese. Weight is from 12/12/19. Last 3 Recorded Weights in this Encounter    12/12/19 0906 04/13/20 1340   Weight: 95.3 kg (210 lb) 79.8 kg (175 lb 14.4 oz)   4/13 recorded as standing scale weight. Weight loss of ~17% consistent with muscle atrophy associated with limited mobility. Current Body mass index is 28.39 kg/m². BMI class of overweight. No new weight available. Macronutrient needs: (using Standing scale 80 kg) reassessed 4/27 with new weight  EER: 7117-9704 kcal/day (25-30 kcal/kg)  EPR:  g/day (1.2-1.3 g/kg)    Nutrition Intervention:  Meals and snacks: Continue current diet. Medical food supplement therapy: Continue glucerna TID. Based on available data and periodic checks of pt po consumption on unit his intake appears adequate to meet needs. Coordination of nutrition care by a Nutrition Professional: Weekly weight remains ordered. Discharge Nutrition Plan: Continue Oral Nutrition Supplement (ONS) at discharge. Recommend Glucerna or a comparable/similar product Three times daily for 30 days unless otherwise directed by your Primary Care Physician.     Jean Pierre [place], [address] [facility] [address], [address]

## 2020-05-11 NOTE — PROGRESS NOTES
Problem: Mobility Impaired (Adult and Pediatric)  Goal: *Acute Goals and Plan of Care  Description  GOALS UPDATED ON RE-ASSESSMENT 5/5/2020 (BOLD INDICATES UPDATED GOAL):  1. Patient will perform bed mobility with MODIFIED INDEPENDENCE and 0 verbal cues within 7 treatment days. 2. Patient will perform transfer bed to chair/wheelchair with MODIFIED INDEPENDENCE within 7 treatment days. 3. Patient will demonstrate FAIR+ dynamic balance throughout ambulation within 7 treatment days. 4. Patient demonstrate 3+/5 strength in L LE hip flexion, hip abduction, and hip adduction within 7 treatment days. 5. Patient will ambulate 300ft+ with STAND BY ASSIST and least retrictive assistive device and L LE AFO within 7 treatment days. 6. Patient will perform wheelchair mobility 75 ft with modified independence within 7 treatment days. 7. Patient will don/doff LUE sling for protection of L shoulder during transfers/gait with set up within 7 treatment days. 8. Patient will instruct caregiver how to don/doff L LE AFO with independence within 7 treatment days. PHYSICAL THERAPY: Daily Note and AM 5/11/2020  INPATIENT: PT Visit Days : 5  Payor: [id number] / Plan: [place] / Product Type: Medicaid /       NAME/AGE/GENDER: Samantha Talavera is a 53 y.o. male   PRIMARY DIAGNOSIS: Acute ischemic stroke ([facility] [I63.9]  Cerebrovascular accident (CVA) due to embolism ([address] [G13.2] Acute embolic stroke ([address] Acute embolic stroke ([address]       ICD-10: Treatment Diagnosis:   · Difficulty in walking, Not elsewhere classified (R26.2)  · Hemiplegia and hemiparesis following cerebral infarction affecting left non-dominant side (T35.768)   Precaution/Allergies:  Patient has no known allergies. ASSESSMENT:     Mr. Henriquez is a 53year old admitted R MCA CVA. Patient was supine upon contact and agreeable to PT.  Patient had increased difficulty with supine to sit attempting several times to perform on his own power but unable to eventually needing mod assist to complete. Donned L LE AFO and tennis shoes with total assistance. Patient transferred to standing with CGA and cues for technique. Once standing patient ambulates 300ft then 100ft with hemiwalker and gait belt. Patient took a seated rest break in between ambulation bouts. Patient had increased difficulty performing transfer from low wheelchair needing mod assist to achieve standing. Cues for gait mechanics, assistive device sequencing, and dynamic standing balance throughout. Mechanics much improved with L LE AFO, circumduction decreased, knee hyperextension decreased, and improved clearance during swing phase of gait. Patient was left with ambulation team to take him outside. Overall slow progress towards physical therapy goals. Patient's goals listed above are still appropriate. Will continue skilled PT to address remaining deficits. This section established at most recent assessment   PROBLEM LIST (Impairments causing functional limitations):  1. Decreased Strength  2. Decreased ADL/Functional Activities  3. Decreased Transfer Abilities  4. Decreased Ambulation Ability/Technique  5. Decreased Balance  6. Increased Pain  7. Decreased Activity Tolerance  8. Increased Fatigue  9. Decreased Flexibility/Joint Mobility   INTERVENTIONS PLANNED: (Benefits and precautions of physical therapy have been discussed with the patient.)  1. Balance Exercise  2. Bed Mobility  3. Family Education  4. Gait Training  5. Home Exercise Program (HEP)  6. Manual Therapy  7. Neuromuscular Re-education/Strengthening  8. Range of Motion (ROM)  9. Therapeutic Activites  10. Therapeutic Exercise/Strengthening  11. Transfer Training     TREATMENT PLAN: Frequency/Duration: 5 times a week for duration of hospital stay  Rehabilitation Potential For Stated Goals: Good     REHAB RECOMMENDATIONS (at time of discharge pending progress):    Placement:   It is my opinion, based on this patient's performance to date, that Mr. Teresa Christine may benefit from intensive therapy at an [address] [place] [address] after discharge due to a probable need for close medical supervision by a rehab physician, a probable need for multiple therapy disciplines and potential to make ongoing and sustainable functional improvement that is of practical value. .  Equipment:    TBD pending progress with therapy. HISTORY:   History of Present Injury/Illness (Reason for Referral):  Per H&P: \"Pt is a 54 y/o smoker with DM, HTN, who presented to ER with L leg and arm weakness, L facial droop, dysarthria. First noted L leg weakness late night 12/11 when he woke up to go to the bathroom. He was normal when he went to bed around 1030. Woke up this morning and had persistent weakness L leg and also now noted in L arm. EMS called. Noted to have slurred speech and L facial droop as well. Code S called in ER around 9am.  MRI with acute infarcts in L cerebellar hemisphere, deep frontoparietal white matter, and R paramedian yariel. Also noted old lacunar infarcts. No large vessel occlusion on CTA, but some stenosis noted. No hx afib, TIA, CVA. No CP, palpitations, SOB. \"  Past Medical History/Comorbidities:   Mr. Teresa Christine  has a past medical history of Acute ischemic stroke ([address] (12/12/2019), Acute pancreatitis (11/19/2014), Cerebrovascular accident (CVA) due to embolism ([address] (12/12/2019), Diabetes ([address] (2002), Diabetes ([address] Diabetes mellitus, and Hypertension. He also has no past medical history of Arthritis, Asthma, Autoimmune disease ([address] CAD (coronary artery disease), Cancer ([address] Chronic kidney disease, COPD, Dementia, Dementia ([address] Heart failure ([address] Ill-defined condition, Infectious disease, Liver disease, Other ill-defined conditions(799.89), Psychiatric disorder, PUD (peptic ulcer disease), Seizures ([address] or Sleep disorder.   Mr. Teresa Christine  has a past surgical history that includes hx hernia repair and hx orthopaedic. Social History/Living Environment:   Home Environment: Apartment  # Steps to Enter: 12  Rails to Enter: Yes  [employer] : Bilateral  One/Two Story Residence: One story  Living Alone: No  Support Systems: Spouse/Significant Other/Partner  Patient Expects to be Discharged to[de-identified] Unknown  Current DME Used/Available at Home: None  Tub or Shower Type: Tub/Shower combination  Prior Level of Function/Work/Activity:  Independent, lives with wife in 2nd story 1 level apartment. No recent falls. Number of Personal Factors/Comorbidities that affect the Plan of Care: 1-2: MODERATE COMPLEXITY   EXAMINATION:   Most Recent Physical Functioning:   Gross Assessment: left hip grossly 2/5 to 3-/5                       Balance:  Sitting: Intact  Standing: Impaired  Standing - Static: Good  Standing - Dynamic : Fair Bed Mobility:  Supine to Sit: Moderate assistance  Wheelchair Mobility: NT     Transfers:  Sit to Stand: Contact guard assistance;Minimum assistance  Stand to Sit: Stand-by assistance  Gait:     Base of Support: Center of gravity altered;Shift to right  Speed/Clotilde: Slow  Step Length: Left shortened;Right shortened  Gait Abnormalities: Hemiplegic;Trunk sway increased  Distance (ft): (300' then 150)  Assistive Device: Gait belt;Brace/Splint; Walker luke  Ambulation - Level of Assistance: Contact guard assistance      Body Structures Involved:  1. Nerves  2. Voice/Speech  3. Bones  4. Joints  5. Muscles Body Functions Affected:  1. Mental  2. Sensory/Pain  3. Neuromusculoskeletal  4. Movement Related Activities and Participation Affected:  1. General Tasks and Demands  2. Mobility  3. Self Care  4.  Interpersonal Interactions and Relationships   Number of elements that affect the Plan of Care: 4+: HIGH COMPLEXITY   CLINICAL PRESENTATION:   Presentation: Evolving clinical presentation with changing clinical characteristics: MODERATE COMPLEXITY   CLINICAL DECISION MAKIN [facility] [place] [address] [address] AM-PAC Grit.Saliva Clicks   Basic Mobility Inpatient Short Form  How much difficulty does the patient currently have. .. Unable A Lot A Little None   1. Turning over in bed (including adjusting bedclothes, sheets and blankets)? [] 1   [] 2   [] 3   [x] 4   2. Sitting down on and standing up from a chair with arms ( e.g., wheelchair, bedside commode, etc.)   [] 1   [] 2   [x] 3   [] 4   3. Moving from lying on back to sitting on the side of the bed? [] 1   [] 2   [] 3   [x] 4   How much help from another person does the patient currently need. .. Total A Lot A Little None   4. Moving to and from a bed to a chair (including a wheelchair)? [] 1   [] 2   [x] 3   [] 4   5. Need to walk in hospital room? [] 1   [] 2   [x] 3   [] 4   6. Climbing 3-5 steps with a railing? [] 1   [x] 2   [] 3   [] 4   © 2007, Trustees of [facility], under license to [employer]. All rights reserved      Score:  Initial: 13 Most Recent: 19 (Date: 5/5/2020)    Interpretation of Tool:  Represents activities that are increasingly more difficult (i.e. Bed mobility, Transfers, Gait). Medical Necessity:     · Patient is expected to demonstrate progress in   · strength, range of motion, balance, coordination, and functional technique  ·  to   · increase independence with all mobility. · .  Reason for Services/Other Comments:  · Patient continues to require skilled intervention due to   · medical complications and mobility deficits which impact his level of function, safety, and independence as indicated above. · .   Use of outcome tool(s) and clinical judgement create a POC that gives a: Questionable prediction of patient's progress: MODERATE COMPLEXITY        TREATMENT:      Pre-treatment Symptoms/Complaints: see above  Pain: Initial: 0/10 Post Session:  0/10       Therapeutic Activity (  33 Minutes):   Therapeutic activities including bed mobility, transfer training from various surface heights, static/dynamic standing balance training, safety awareness training, ambulation on level ground with L LE AFO and luke walker, and patient education to improve mobility, strength, balance and coordination. Required minimal visual, verbal and tactile cues to promote static and dynamic balance in standing. Braces/Orthotics/Lines/Etc:   · Sling to LUE  · L LE AFO  Treatment/Session Assessment:    · Response to Treatment:  See above  · Interdisciplinary Collaboration:   o Physical Therapy Assistant  o Registered Nurse  o Rehabilitation Attendant  · After treatment position/precautions:   o Up in chair  o Bed/Chair-wheels locked  o Call light within reach  o RN notified  o ambulation team at bedside   · Compliance with Program/Exercises: Compliant all of the time  · Recommendations/Intent for next treatment session: \"Next visit will focus on advancements to more challenging activities and reduction in assistance provided\".     Total Treatment Duration:  PT Patient Time In/Time Out  Time In: 1345  Time Out: 31565 Dick Mg, PTA

## 2020-05-11 NOTE — PROGRESS NOTES
Attempted to see pt for OT treatment this AM. Pt sleeping upon arrival, easily awakened to voice, but kindly declined therapy at this time. Pt did report that he would be interested in working with therapy later in the day. Will attempt at later time and as schedule permits.     Thank you,     Mary Fairchild OTR/L

## 2020-05-12 PROCEDURE — 97110 THERAPEUTIC EXERCISES: CPT

## 2020-05-12 PROCEDURE — 97116 GAIT TRAINING THERAPY: CPT

## 2020-05-12 PROCEDURE — 74011250637 HC RX REV CODE- 250/637: Performed by: INTERNAL MEDICINE

## 2020-05-12 PROCEDURE — 65270000029 HC RM PRIVATE

## 2020-05-12 PROCEDURE — 97535 SELF CARE MNGMENT TRAINING: CPT

## 2020-05-12 PROCEDURE — 74011250636 HC RX REV CODE- 250/636: Performed by: INTERNAL MEDICINE

## 2020-05-12 PROCEDURE — 97168 OT RE-EVAL EST PLAN CARE: CPT

## 2020-05-12 PROCEDURE — 74011250637 HC RX REV CODE- 250/637: Performed by: HOSPITALIST

## 2020-05-12 PROCEDURE — 74011250637 HC RX REV CODE- 250/637: Performed by: NURSE PRACTITIONER

## 2020-05-12 RX ADMIN — ATORVASTATIN CALCIUM 80 MG: 80 TABLET, FILM COATED ORAL at 21:23

## 2020-05-12 RX ADMIN — GUAIFENESIN 100 MG: 100 SOLUTION ORAL at 17:39

## 2020-05-12 RX ADMIN — ACETAMINOPHEN 650 MG: 325 TABLET, FILM COATED ORAL at 05:34

## 2020-05-12 RX ADMIN — METOPROLOL SUCCINATE 25 MG: 25 TABLET, FILM COATED, EXTENDED RELEASE ORAL at 09:03

## 2020-05-12 RX ADMIN — DIPHENHYDRAMINE HYDROCHLORIDE 25 MG: 25 CAPSULE ORAL at 17:39

## 2020-05-12 RX ADMIN — ACETAMINOPHEN 650 MG: 325 TABLET, FILM COATED ORAL at 21:24

## 2020-05-12 RX ADMIN — ENOXAPARIN SODIUM 40 MG: 40 INJECTION SUBCUTANEOUS at 13:52

## 2020-05-12 RX ADMIN — METFORMIN HYDROCHLORIDE 500 MG: 500 TABLET ORAL at 17:38

## 2020-05-12 RX ADMIN — ASPIRIN 81 MG 81 MG: 81 TABLET ORAL at 09:03

## 2020-05-12 RX ADMIN — Medication 400 MG: at 17:38

## 2020-05-12 RX ADMIN — METFORMIN HYDROCHLORIDE 500 MG: 500 TABLET ORAL at 09:03

## 2020-05-12 RX ADMIN — ACETAMINOPHEN 650 MG: 325 TABLET, FILM COATED ORAL at 13:53

## 2020-05-12 RX ADMIN — Medication 400 MG: at 09:03

## 2020-05-12 RX ADMIN — LISINOPRIL 40 MG: 20 TABLET ORAL at 09:03

## 2020-05-12 NOTE — PROGRESS NOTES
Hospitalist Progress Note    Subjective:   Daily Progress Note: 5/12/2020 12:52 PM    Patient admitted 12/12/19 with acute right side embolic stroke with left side residual weakness and left facial droop. CT brain on admission with indeterminate infarctions within the left corona radiata/caudate.  CTA of the head and neck with stenosis at the distal segment of the right  Vertebral artery and multifocal stenosis in the P2 segment of the left posterior cerebral artery.  MRI brain with acute to early subacute infarcts in the left cerebellar hemisphere in the periventricular deep left frontoparietal white matter and likely additional areas of restricted diffusion in the right paramedian yariel. Echo with + PFO, on statin and ASA.  Will need event monitor on discharge and if no arrhythmia, PFO closure recommended.  Wife informs CM she does not want patient at home, they were planning to separate prior to this stroke.  CM attempting  to place in STR, medicaid pending.    4/9:  Speech remains slightly slurred, SLP signing off as clinical indication no longer needed.    4/10: Mag critically low today: 1.3. Replaced   4/14:  Continues to wait for placement.  Good participation with PT/OT. Art Pickle a little use of right hand, still unable to .    5/2:  Fitted for AFO today per orthotist.  No complaints.  Continued wait for medicaid and SSI approval.  Metformin dosing adjusted a few days ago.  Glucose 118-126 x 24 hours.  CM spoke with wife, still does not want patient to come home, even if he has to go to a shelter.    5/11:  Still waiting for disability rating, Medicaid. Katarzyna Araujo this am.  Dietician in. Participating with PT/OT, ambulated total of 400 feet. 5/12:  Sleepy again this am.  Will rouse readily when requested. DM management in. Glucose stable on metformin. No complaints. Status quo.     Current Facility-Administered Medications   Medication Dose Route Frequency    magnesium oxide (MAG-OX) tablet 400 mg  400 mg Oral BID    metFORMIN (GLUCOPHAGE) tablet 500 mg  500 mg Oral BID WITH MEALS    acetaminophen (TYLENOL) tablet 650 mg  650 mg Oral Q4H PRN    diphenhydrAMINE (BENADRYL) capsule 25 mg  25 mg Oral Q6H PRN    acetaminophen (TYLENOL) tablet 650 mg  650 mg Oral Q8H    lip protectant (BLISTEX) ointment 1 Each  1 Each Topical PRN    metoprolol succinate (TOPROL-XL) XL tablet 25 mg  25 mg Oral DAILY    polyethylene glycol (MIRALAX) packet 17 g  17 g Oral DAILY PRN    guaiFENesin (ROBITUSSIN) 100 mg/5 mL oral liquid 100 mg  100 mg Oral Q4H PRN    hydrALAZINE (APRESOLINE) 20 mg/mL injection 20 mg  20 mg IntraVENous Q4H PRN    atorvastatin (LIPITOR) tablet 80 mg  80 mg Oral QHS    lisinopril (PRINIVIL, ZESTRIL) tablet 40 mg  40 mg Oral DAILY    sodium chloride (NS) flush 5-40 mL  5-40 mL IntraVENous PRN    ondansetron (ZOFRAN) injection 4 mg  4 mg IntraVENous Q4H PRN    aspirin chewable tablet 81 mg  81 mg Oral DAILY    enoxaparin (LOVENOX) injection 40 mg  40 mg SubCUTAneous Q24H    dextrose 40% (GLUTOSE) oral gel 1 Tube  15 g Oral PRN    glucagon (GLUCAGEN) injection 1 mg  1 mg IntraMUSCular PRN    dextrose (D50W) injection syrg 12.5-25 g  25-50 mL IntraVENous PRN        Review of Systems  A comprehensive review of systems was negative except for that written in the HPI. Objective:     Visit Vitals  /84 (BP 1 Location: Right arm, BP Patient Position: At rest)   Pulse 75   Temp 97.5 °F (36.4 °C)   Resp 20   Ht 5' 6\" (1.676 m)   Wt 79.8 kg (175 lb 14.4 oz)   SpO2 97%   BMI 28.39 kg/m²      O2 Device: Room air    Temp (24hrs), Av.7 °F (36.5 °C), Min:96.3 °F (35.7 °C), Max:98.4 °F (36.9 °C)    05/10 1901 -  0700  In: 240 [P.O.:240]  Out: -     General appearance: Sleepy again today. Oriented. Denies need or new issues.    Head: Normocephalic, without obvious abnormality, atraumatic  Eyes: conjunctivae/corneas clear.  PERRL  Throat: Continued mild left facial weakness.  Lips, mucosa, and tongue normal. Teeth and gums normal  Neck: supple, symmetrical, trachea midline, no JVD  Lungs: clear to auscultation bilaterally  Heart: regular rate and rhythm, S1, S2 normal, no murmur, click, rub or gallop  Abdomen: soft, non-tender. Bowel sounds normal. No masses,  no organomegaly  Extremities: Persistent left side upper and lower residual weakness, improved since admission.       Skin: Skin color, texture, turgor normal. No rashes or lesions  Neurologic: As above.     Additional comments: Notes,orders, test results, vitals reviewed    Data Review   :  Ma.8    LAST LABS:     Ref. Range 2020 04:08   WBC Latest Ref Range: 4.3 - 11.1 K/uL 5.9   RBC Latest Ref Range: 4.23 - 5.6 M/uL 4.06 (L)   HGB Latest Ref Range: 13.6 - 17.2 g/dL 11.0 (L)   HCT Latest Ref Range: 41.1 - 50.3 % 34.2 (L)   MCV Latest Ref Range: 79.6 - 97.8 FL 84.2   MCH Latest Ref Range: 26.1 - 32.9 PG 27.1   MCHC Latest Ref Range: 31.4 - 35.0 g/dL 32.2   RDW Latest Ref Range: 11.9 - 14.6 % 16.2 (H)   PLATELET Latest Ref Range: 150 - 450 K/uL 187   MPV Latest Ref Range: 9.4 - 12.3 FL 11.9   NEUTROPHILS Latest Ref Range: 43 - 78 % 50   LYMPHOCYTES Latest Ref Range: 13 - 44 % 43   MONOCYTES Latest Ref Range: 4.0 - 12.0 % 6   EOSINOPHILS Latest Ref Range: 0.5 - 7.8 % 1   BASOPHILS Latest Ref Range: 0.0 - 2.0 % 0   IMMATURE GRANULOCYTES Latest Ref Range: 0.0 - 5.0 % 0   DF Latest Units:   AUTOMATED   ABSOLUTE NRBC Latest Ref Range: 0.0 - 0.2 K/uL 0.00   ABS. NEUTROPHILS Latest Ref Range: 1.7 - 8.2 K/UL 2.9   ABS. IMM. GRANS. Latest Ref Range: 0.0 - 0.5 K/UL 0.0   ABS. LYMPHOCYTES Latest Ref Range: 0.5 - 4.6 K/UL 2.5   ABS. MONOCYTES Latest Ref Range: 0.1 - 1.3 K/UL 0.3   ABS. EOSINOPHILS Latest Ref Range: 0.0 - 0.8 K/UL 0.1   ABS.  BASOPHILS Latest Ref Range: 0.0 - 0.2 K/UL 0.0   Sodium Latest Ref Range: 136 - 145 mmol/L 141   Potassium Latest Ref Range: 3.5 - 5.1 mmol/L 4.2   Chloride Latest Ref Range: 98 - 107 mmol/L 108 (H)   CO2 Latest Ref Range: 21 - 32 mmol/L 25   Anion gap Latest Ref Range: 7 - 16 mmol/L 8   Glucose Latest Ref Range: 65 - 100 mg/dL 95   BUN Latest Ref Range: 6 - 23 MG/DL 18   Creatinine Latest Ref Range: 0.8 - 1.5 MG/DL 0.96   Calcium Latest Ref Range: 8.3 - 10.4 MG/DL 9.5   GFR est non-AA Latest Ref Range: >60 ml/min/1.73m2 >60   GFR est AA Latest Ref Range: >60 ml/min/1.73m2 >60   Bilirubin, total Latest Ref Range: 0.2 - 1.1 MG/DL 0.4   Protein, total Latest Ref Range: 6.3 - 8.2 g/dL 6.5   Albumin Latest Ref Range: 3.5 - 5.0 g/dL 2.7 (L)   Globulin Latest Ref Range: 2.3 - 3.5 g/dL 3.8 (H)   A-G Ratio Latest Ref Range: 1.2 - 3.5   0.7 (L)   ALT (SGPT) Latest Ref Range: 12 - 65 U/L 18   AST Latest Ref Range: 15 - 37 U/L 16   Alk. phosphatase Latest Ref Range: 50 - 136 U/L 68       12/12/19:  CT HEAD:  Age indeterminate infarctions within the left corona radiata/caudate. Mild chronic small vessel ischemic changes and areas of remote infarction as described.  Probable partial volume averaging the region of the left thalamus rather than intraparenchymal hemorrhage.     12/12/19:  CTA HEAD AND NECK:      Stenosis at the distal segment of the right vertebral artery and multifocal  stenosis in the P2 segment of the left posterior cerebral artery.     12/12/19:  MRI BRAIN:  Acute to early subacute infarcts in the left cerebellar hemisphere, in the periventricular deep left frontoparietal white matter and likely additional areas of restricted diffusion in the right paramedian yariel. Old lacunar infarcts in the corpus striatum and periventricular white matter as well as within the left corona radiata.     1/8/20:  SWALLOW FUNCTION VIDEO: Small quantity aspiration with straw sips of thin liquids.     Assessment/Plan:   Acute to early subacute infarcts in the left cerebellar hemisphere, in the periventricular deep left frontoparietal white matter and likely additional areas of restricted diffusion in the right paramedian yariel.  Old lacunar infarcts also.                  Making good progress with PT/OT                Speech and swallowing improved to the extent SLP signed off                 Pending placement after approved for  SSI and medicaid:  extended     Dysarthria due to stroke:  Improved immensely              SLP releasing 4/14      Difficult placement with marital discord prior to stroke:  Wife does not want him at home:  CM working on tirelessly              Now issues obtaining MR for [employer], needs SSI              CM spoke with wife 5/1, she still does not want patient to come home.      Hypertension:  Continue lisinopril, toprol XL     IDDM II:  Continue glucophage, diabetic diet.  A1C: 8.4, rechecking               adding SSI 4/15              DM education and  management continues to follow intermittently,   increased glucophage frequency 4/30              Discontinue SSI      Arrhythmia:  On beta blocker     PFO 12/17/2019:                Will need 4 week event monitor on    discharge               Will need PFO closure at some point after discharge per Cards      Hypomagnesemia:  Replace and recheck               Increasing daily 400 mg dose to bid 5/2.              Recheck mag 5/5/20: 1.8     Care Plan discussed with: Patient and Nurse    Signed By: Devan Sun NP     May 12, 2020

## 2020-05-12 NOTE — DIABETES MGMT
Patient admitted with acute embolic stroke affecting left side. FSBS yesterday was 94. Reviewed patient current regimen: Metformin 500mg BID. Patient awaiting placement see CM note for details. Will continue to follow along loosely.

## 2020-05-12 NOTE — PROGRESS NOTES
Problem: Mobility Impaired (Adult and Pediatric)  Goal: *Acute Goals and Plan of Care  Description  GOALS REVIEWED ON 5/12/2020 (BOLD INDICATES MOST RECENTLY UPDATED GOAL):  1. Patient will perform bed mobility with MODIFIED INDEPENDENCE and 0 verbal cues within 7 treatment days. 2. Patient will perform transfer bed to chair/wheelchair with MODIFIED INDEPENDENCE within 7 treatment days. 3. Patient will demonstrate FAIR+ dynamic balance throughout ambulation within 7 treatment days. 4. Patient demonstrate 3+/5 strength in L LE hip flexion, hip abduction, and hip adduction within 7 treatment days. 5. Patient will ambulate 300ft+ with STAND BY ASSIST and least retrictive assistive device and L LE AFO within 7 treatment days. 6. Patient will perform wheelchair mobility 75 ft with modified independence within 7 treatment days. 7. Patient will don/doff LUE sling for protection of L shoulder during transfers/gait with set up within 7 treatment days. 8. Patient will instruct caregiver how to don/doff L LE AFO with independence within 7 treatment days. PHYSICAL THERAPY: Daily Note and AM 5/12/2020  INPATIENT: PT Visit Days : 1  [id number] [address] / Plan: [address] [place] [address] / Product Type: Medicaid /       NAME/AGE/GENDER: Tammy Montes is a 53 y.o. male   PRIMARY DIAGNOSIS: Acute ischemic stroke ([address] [I63.9]  Cerebrovascular accident (CVA) due to embolism ([address] [Q26.4] Acute embolic stroke ([address] Acute embolic stroke ([address]       ICD-10: Treatment Diagnosis:   · Difficulty in walking, Not elsewhere classified (R26.2)  · Hemiplegia and hemiparesis following cerebral infarction affecting left non-dominant side (N54.392)   Precaution/Allergies:  Patient has no known allergies. ASSESSMENT:     Mr. Henriquez is a 53year old admitted R MCA CVA. Patient was supine upon contact and agreeable to PT.  Performed BLE exercises in supine to add to HEP to improve strength for ambulation and transfers, able to complete with moderate manual and verbal cues. Required additional time but SBA to transfer to sitting. Donned L LE AFO and tennis shoes with total assistance. Patient transferred to standing with CGA and cues for technique. Agreeable to attempt ambulation with use of rolling walker with platform as he did not have L LE AFO last attempt, exhibited increased circumduction. He required cues for increased DIEUDONNE and to decrease LLE hip external rotation, however overall improved balance, posture with ambulation with rolling walker with L platform. Fatigued more quickly during ambulation and required a seated rest break at 150'. Patient had increased difficulty performing transfer from low wheelchair needing minimal assist to achieve standing as well as placement of LLE. Cues for gait mechanics, assistive device sequencing, and dynamic standing balance throughout. Mechanics much improved with L LE AFO, circumduction decreased, knee hyperextension decreased, and improved clearance during swing phase of gait. Patient was left with ambulation team to take him outside. Overall slow progress towards physical therapy goals. Patient's goals listed above are still appropriate. Reviewed and remain appropriate for additional 7 treatments. Will continue skilled PT to address remaining deficits. This section established at most recent assessment   PROBLEM LIST (Impairments causing functional limitations):  1. Decreased Strength  2. Decreased ADL/Functional Activities  3. Decreased Transfer Abilities  4. Decreased Ambulation Ability/Technique  5. Decreased Balance  6. Increased Pain  7. Decreased Activity Tolerance  8. Increased Fatigue  9. Decreased Flexibility/Joint Mobility   INTERVENTIONS PLANNED: (Benefits and precautions of physical therapy have been discussed with the patient.)  1. Balance Exercise  2. Bed Mobility  3. Family Education  4. Gait Training  5. Home Exercise Program (HEP)  6.  Manual Therapy  7. Neuromuscular Re-education/Strengthening  8. Range of Motion (ROM)  9. Therapeutic Activites  10. Therapeutic Exercise/Strengthening  11. Transfer Training     TREATMENT PLAN: Frequency/Duration: 5 times a week for duration of hospital stay  Rehabilitation Potential For Stated Goals: Good     REHAB RECOMMENDATIONS (at time of discharge pending progress):    Placement: It is my opinion, based on this patient's performance to date, that Mr. Ellis Nair may benefit from intensive therapy at an [address] [place] [address] after discharge due to a probable need for close medical supervision by a rehab physician, a probable need for multiple therapy disciplines and potential to make ongoing and sustainable functional improvement that is of practical value. .  Equipment:    TBD pending progress with therapy. HISTORY:   History of Present Injury/Illness (Reason for Referral):  Per H&P: \"Pt is a 54 y/o smoker with DM, HTN, who presented to ER with L leg and arm weakness, L facial droop, dysarthria. First noted L leg weakness late night 12/11 when he woke up to go to the bathroom. He was normal when he went to bed around 1030. Woke up this morning and had persistent weakness L leg and also now noted in L arm. EMS called. Noted to have slurred speech and L facial droop as well. Code S called in ER around 9am.  MRI with acute infarcts in L cerebellar hemisphere, deep frontoparietal white matter, and R paramedian yariel. Also noted old lacunar infarcts. No large vessel occlusion on CTA, but some stenosis noted. No hx afib, TIA, CVA. No CP, palpitations, SOB. \"  Past Medical History/Comorbidities:   Mr. Ellis Nair  has a past medical history of Acute ischemic stroke ([address] (12/12/2019), Acute pancreatitis (11/19/2014), Cerebrovascular accident (CVA) due to embolism ([address] (12/12/2019), Diabetes ([address] (2002), Diabetes ([address] Diabetes mellitus, and Hypertension.  He also has no past medical history of Arthritis, Asthma, Autoimmune disease ([facility] CAD (coronary artery disease), Cancer ([facility] Chronic kidney disease, COPD, Dementia, Dementia ([facility] Heart failure ([facility] Ill-defined condition, Infectious disease, Liver disease, Other ill-defined conditions(799.89), Psychiatric disorder, PUD (peptic ulcer disease), Seizures ([facility] or Sleep disorder. Mr. Fiorella Urbina  has a past surgical history that includes hx hernia repair and hx orthopaedic. Social History/Living Environment:   Home Environment: Apartment  # Steps to Enter: 12  Rails to Enter: Yes  [employer] : Bilateral  One/Two Story Residence: One story  Living Alone: No  Support Systems: Spouse/Significant Other/Partner  Patient Expects to be Discharged to[de-identified] Unknown  Current DME Used/Available at Home: None  Tub or Shower Type: Tub/Shower combination  Prior Level of Function/Work/Activity:  Independent, lives with wife in 2nd story 1 level apartment. No recent falls. Number of Personal Factors/Comorbidities that affect the Plan of Care: 1-2: MODERATE COMPLEXITY   EXAMINATION:   Most Recent Physical Functioning:   Gross Assessment: left hip grossly 2/5 to 3-/5                       Balance:  Sitting: Intact  Standing: Impaired  Standing - Static: Good  Standing - Dynamic : Fair Bed Mobility:  Supine to Sit: Stand-by assistance; Adaptive equipment; Additional time  Scooting: Contact guard assistance;Minimum assistance  Interventions: Safety awareness training;Verbal cues; Visual cues  Wheelchair Mobility: NT     Transfers:  Sit to Stand: Minimum assistance  Stand to Sit: Contact guard assistance  Interventions: Safety awareness training;Verbal cues; Visual cues  Gait:     Base of Support: Center of gravity altered;Shift to left;Narrowed  Speed/Clotilde: Fluctuations  Step Length: Left shortened;Right shortened  Gait Abnormalities: Hemiplegic;Trunk sway increased; Path deviations  Distance (ft): 150 Feet (ft)(x2)  Assistive Device: Gait belt;Brace/Splint; Jose Iverson rolling(with platform attachment)  Ambulation - Level of Assistance: Minimal assistance  Interventions: Safety awareness training;Verbal cues;Manual cues; Visual/Demos      Body Structures Involved:  1. Nerves  2. Voice/Speech  3. Bones  4. Joints  5. Muscles Body Functions Affected:  1. Mental  2. Sensory/Pain  3. Neuromusculoskeletal  4. Movement Related Activities and Participation Affected:  1. General Tasks and Demands  2. Mobility  3. Self Care  4. Interpersonal Interactions and Relationships   Number of elements that affect the Plan of Care: 4+: HIGH COMPLEXITY   CLINICAL PRESENTATION:   Presentation: Evolving clinical presentation with changing clinical characteristics: MODERATE COMPLEXITY   CLINICAL DECISION MAKIN [facility] Mobility Inpatient Short Form  How much difficulty does the patient currently have. .. Unable A Lot A Little None   1. Turning over in bed (including adjusting bedclothes, sheets and blankets)? [] 1   [] 2   [] 3   [x] 4   2. Sitting down on and standing up from a chair with arms ( e.g., wheelchair, bedside commode, etc.)   [] 1   [] 2   [x] 3   [] 4   3. Moving from lying on back to sitting on the side of the bed? [] 1   [] 2   [] 3   [x] 4   How much help from another person does the patient currently need. .. Total A Lot A Little None   4. Moving to and from a bed to a chair (including a wheelchair)? [] 1   [] 2   [x] 3   [] 4   5. Need to walk in hospital room? [] 1   [] 2   [x] 3   [] 4   6. Climbing 3-5 steps with a railing? [] 1   [x] 2   [] 3   [] 4   © , Trustees of [address] [place] [address] [address] [address], under license to [employer]. All rights reserved      Score:  Initial: 13 Most Recent: 19 (Date: 2020)    Interpretation of Tool:  Represents activities that are increasingly more difficult (i.e. Bed mobility, Transfers, Gait).     Medical Necessity:     · Patient is expected to demonstrate progress in   · strength, range of motion, balance, coordination, and functional technique  ·  to   · increase independence with all mobility. · .  Reason for Services/Other Comments:  · Patient continues to require skilled intervention due to   · medical complications and mobility deficits which impact his level of function, safety, and independence as indicated above. · .   Use of outcome tool(s) and clinical judgement create a POC that gives a: Questionable prediction of patient's progress: MODERATE COMPLEXITY        TREATMENT:      Pre-treatment Symptoms/Complaints: see above  Pain: Initial: 0/10 Post Session:  0/10     Gait Training (  25 minutes):  Gait training to improve and/or restore physical functioning as related to mobility, strength and balance. Ambulated 150 Feet (ft)(x2) with Minimal assistance using a Gait belt;Brace/Splint; Walker, rolling(with platform attachment) and minimal Safety awareness training;Verbal cues;Manual cues; Visual/Demos related to their stance phase, stride length, push off and heel strike to promote proper body alignment, promote proper body posture, promote proper body mechanics and promote proper body breathing techniques. Instruction in performance of decreased LLE external rotation to correct stance phase, stride length and push off. Therapeutic Exercise: (  15 minutes):  Exercises per grid below to improve mobility, strength, balance and coordination. Required minimal visual, verbal and manual cues to promote proper body alignment and promote proper body posture. Progressed complexity of movement as indicated.      Date:  5/12/20 Date:   Date:     Activity/Exercise Parameters Parameters Parameters   Supine hip internal rotation 10 BLE A     Supine quadriceps sets 10 2' hold BLE A     Supine SLR 10 AAROM BLE     bridges 10 AROM                           Braces/Orthotics/Lines/Etc:   · L LE AFO  Treatment/Session Assessment:    · Response to Treatment:  See above  · Interdisciplinary Collaboration: o Physical Therapist  o Registered Nurse  o Rehabilitation Attendant  · After treatment position/precautions:   o Up in chair  o Bed/Chair-wheels locked  o Call light within reach  o RN notified  o ambulation team at bedside   · Compliance with Program/Exercises: Compliant all of the time  · Recommendations/Intent for next treatment session: \"Next visit will focus on advancements to more challenging activities and reduction in assistance provided\".     Total Treatment Duration:  PT Patient Time In/Time Out  Time In: 1325  Time Out: [address], PT, DPT

## 2020-05-12 NOTE — PROGRESS NOTES
Problem: Pressure Injury - Risk of  Goal: *Prevention of pressure injury  Description: Document Dewey Scale and appropriate interventions in the flowsheet.   Outcome: Progressing Towards Goal  Note: Pressure Injury Interventions:  Sensory Interventions: Assess changes in LOC    Moisture Interventions: Absorbent underpads    Activity Interventions: Increase time out of bed    Mobility Interventions: HOB 30 degrees or less, Pressure redistribution bed/mattress (bed type)    Nutrition Interventions: Offer support with meals,snacks and hydration    Friction and Shear Interventions: HOB 30 degrees or less                Problem: Patient Education: Go to Patient Education Activity  Goal: Patient/Family Education  Outcome: Progressing Towards Goal

## 2020-05-12 NOTE — PROGRESS NOTES
Problem: Self Care Deficits Care Plan (Adult)  Goal: *Acute Goals and Plan of Care (Insert Text)  Description  GOALS REVIEWED and UPDATED ON 5/12/2020 (BOLD INDICATES MOST RECENTLY UPDATED GOAL):  1. Patient will demonstrate appropriate safety awareness and protection of L UE during bed mobility and functional transfers with no verbal cues. CONTINUE   2. Patient will complete lower body bathing and dressing with Min A and adaptive equipment as needed. CONTINUE  3. Patient will complete upper body bathing and dressing with Supervision and adaptive equipment as needed. 4. Patient will complete weightbearing into the L UE with ADL tasks with minimal assistance to improve ability to use as a functional assist during ADL tasks. CONTINUE  5. Patient will demonstrate L UE SROM HEP within 7 days. CONTINUE  6. Pt will complete bed mobility with Mod I and no verbal cueing. CONTINUE  7. Pt will complete functional transfers with Supervision with equipment as needed. CONTINUE  8. Pt will don/doff UE sling with Mod I and no verbal cueing. CONTINUE  9. Pt will complete toileting with Supervision for toilet transfer and gown management. 10. Patient will participate in using L UE as a functional assist for ADL for 15 minutes with minimal facilitation. CONTINUE  11. Patient will complete sit to stand at midline with Supervision and cueing. 12. Patient will complete therapeutic exercises with L UE with minimal tactile cues for facilitation of the LUE. CONTINUE  13. Patient will don L LE AFO with SBA and minimal verbal cueing.     Outcome: Progressing Towards Goal     OCCUPATIONAL THERAPY: Daily Note, Re-evaluation and PM    5/12/2020  INPATIENT: OT Visit Days: 1  Payor: [id number] / Plan: [place] / Product Type: Medicaid /      NAME/AGE/GENDER: Joe Silveira is a 53 y.o. male   PRIMARY DIAGNOSIS:  Acute ischemic stroke ([facility] [address] [I63.9]  Cerebrovascular accident (CVA) due to embolism ([facility] [address] [N50.0] Acute embolic stroke ([facility] [address] Acute embolic stroke ([facility] [address]    YPQ-26: Treatment Diagnosis:    · Generalized Muscle Weakness (M62.81)  · Other lack of cordination (R27.8)  · Hemiplegia and hemiparesis following cerebral infarction affecting   · left non-dominant side (I69.354)  · Abnormal posture (R29.3)   Precautions/Allergies:    NO PULLING ON LUE   LUE in sling with shoulder joint approximated and supported, needs taping   Patient has no known allergies. ASSESSMENT:     Mr. Yvette Lindsey presents to the hospital with L sided weakness and acute ischemic CVA. MRI revealed acute to subacute L cerebellar and R paramedian yariel infarcts. 5/12/2020: Pt seen for re-evaluation as pt as reach 7th visit. Pt supine in bed upon arrival. Pt alert and agreeable to be seen by OT. Pt completes supine to sit with SBA. Seated edge of bed, pt with good static and dynamic seated balance. L UE continues to be grossly decreased for ROM, strength, and coordination but pt observed to have increased movement in digits of L hand as pt observed to use digits to assist with brief management during session. Pt requires SBA to don L UE sling with moderate verbal and visual cueing for placement of sling securely on arm and for strap attachment. Pt asked to put on L LE AFO but declines use at this time. Pt requires SBA with use of luke walker for sit to stand. Upon standing, pt with good static and fair dynamic standing balance resulting in pt requiring CGA with use of luke walker to walk from bed to commode. Pt practices transfer onto commode and requires SBA to complete. Pt requires SBA to transfer off of commode and CGA with use of luke walker to walk from commode to bed as pt brief noticed to be saturated when completing transfer. Pt begins to doff diaper brief in standing with SBA and performs wiping with SBA and no loss of balance noted. Pt requires SBA for stand to sit and to doff diaper brief remainder of the way.  Pt requires Min A to don brief on L LE and pulls brief to knees with SBA. Pt requires SBA to complete sit to stand and to don diaper brief remainder of the way. Pt to able to manage gown while OT latches diaper brief more tightly. In seated, pt requires Supervision to doff L UE sling and SBA to doff soiled hospital gown . Pt requires Min A to to don clean hospital gown. Pt requires SBA for sit to supine. Pt left supine in bed with all needs met and within reach. RN notified. Will continue POC. This section established at most recent assessment   PROBLEM LIST (Impairments causing functional limitations):  1. Decreased Strength  2. Decreased ADL/Functional Activities  3. Decreased Transfer Abilities  4. Decreased Ambulation Ability/Technique  5. Decreased Balance  6. Decreased Activity Tolerance  7. Increased Fatigue  8. Decreased Flexibility/Joint Mobility  9. Decreased Knowledge of Precautions  10. Decreased [place] with Home Exercise Program   INTERVENTIONS PLANNED: (Benefits and precautions of occupational therapy have been discussed with the patient.)  1. Activities of daily living training  2. Adaptive equipment training  3. Balance training  4. Clothing management  5. Cognitive training  6. Donning&doffing training  7. Keanu tech training  8. Neuromuscular re-eduation  9. Therapeutic activity  10. Therapeutic exercise     TREATMENT PLAN: Frequency/Duration: Follow patient 3 times per week to address above goals. Rehabilitation Potential For Stated Goals: Excellent     REHAB RECOMMENDATIONS (at time of discharge pending progress):    Placement: It is my opinion, based on this patient's performance to date, that Mr. Jonathon Smith may benefit from intensive therapy at an [address] [place] [address] after discharge due to potential to make ongoing and sustainable functional improvement that is of practical value. Hafsa Randolph Pt functioning far below independent baseline, demonstrating good improvement and participation.  Pt would likely benefit greatly and increase independence from inpatient rehab stay. Equipment:    TBD               OCCUPATIONAL PROFILE AND HISTORY:   History of Present Injury/Illness (Reason for Referral):  See H&P  Past Medical History/Comorbidities:   Mr. Papito Ellison  has a past medical history of Acute ischemic stroke ([address] (12/12/2019), Acute pancreatitis (11/19/2014), Cerebrovascular accident (CVA) due to embolism ([address] (12/12/2019), Diabetes ([address] (2002), Diabetes ([address] Diabetes mellitus, and Hypertension. He also has no past medical history of Arthritis, Asthma, Autoimmune disease ([address] CAD (coronary artery disease), Cancer ([address] Chronic kidney disease, COPD, Dementia, Dementia ([address] Heart failure ([address] Ill-defined condition, Infectious disease, Liver disease, Other ill-defined conditions(799.89), Psychiatric disorder, PUD (peptic ulcer disease), Seizures ([address] or Sleep disorder. Mr. Papito Ellison  has a past surgical history that includes hx hernia repair and hx orthopaedic. Social History/Living Environment:   Home Environment: Apartment  # Steps to Enter: 12  Rails to Enter: Yes  [employer] : Bilateral  One/Two Story Residence: One story  Living Alone: No  Support Systems: Spouse/Significant Other/Partner  Patient Expects to be Discharged to[de-identified] Unknown  Current DME Used/Available at Home: None  Tub or Shower Type: Tub/Shower combination  Prior Level of Function/Work/Activity:  Pt lives at home with his wife. Pt is typically independent with ADL/functional mobility. Pt does not drive. Pt was working part-time at [employer]. Personal Factors:          Age:  53 y.o.         Past/Current Experience:  CVA with flaccid L side        Other factors that influence how disability is experienced by the patient:  multiple co-morbidities    Number of Personal Factors/Comorbidities that affect the Plan of Care: Extensive review of physical, cognitive, and psychosocial performance (3+):  HIGH COMPLEXITY   ASSESSMENT OF OCCUPATIONAL PERFORMANCE[de-identified]   Activities of Daily Living:   Basic ADLs (From Assessment) Complex ADLs (From Assessment)   Feeding: Setup  Oral Facial Hygiene/Grooming: Minimum assistance  Bathing: Minimum assistance, Moderate assistance  Upper Body Dressing: Minimum assistance  Lower Body Dressing: Moderate assistance  Toileting: Minimum assistance Instrumental ADL  Meal Preparation: Total assistance  Homemaking: Total assistance  Medication Management: Total assistance  Financial Management: Total assistance   Grooming/Bathing/Dressing Activities of Daily Living         Upper Body Bathing  Bathing Assistance: Min A   Position Performed: Seated in chair        Toileting  Toileting Assistance: Minimum assistance  Bladder Hygiene: Stand-by assistance  Clothing Management: Minimum assistance   Upper Body [address]: Minimum assistance(Min A to don; SBA to doff)  Hospital Gown: Minimum  assistance(Min A to don; SBA to doff) Functional Transfers  Bathroom Mobility: Contact guard assistance   Lower Body Dressing Assistance  Socks: Total assistance (dependent) Bed/Mat Mobility  Supine to Sit: Stand-by assistance  Sit to Supine: Stand-by assistance  Sit to Stand: Stand-by assistance  Stand to Sit: Stand-by assistance  Scooting: Contact guard assistance     Most Recent Physical Functioning:   Gross Assessment:  AROM: Grossly decreased, non-functional(L UE)  Strength: Grossly decreased, non-functional(L UE)  Coordination: Grossly decreased, non-functional(L UE)               Posture:     Balance:  Sitting: Intact  Standing: Impaired  Standing - Static: Good  Standing - Dynamic : Fair Bed Mobility:  Supine to Sit: Stand-by assistance  Sit to Supine: Stand-by assistance  Scooting: Contact guard assistance  Interventions: Safety awareness training;Verbal cues; Visual cues  Wheelchair Mobility:     Transfers:  Sit to Stand: Stand-by assistance  Stand to Sit: Stand-by assistance  Toilet Transfer : Stand-by assistance  Interventions: Safety awareness training;Verbal cues; Visual cues            Patient Vitals for the past 6 hrs:   BP BP Patient Position SpO2 Pulse   20 1200 126/83 At rest 98 % 61   20 1600 123/81 At rest 98 % 73       Mental Status  Neurologic State: Alert  Orientation Level: Oriented X4  Cognition: Follows commands  Perception: Cues to attend to left side of body  Perseveration: No perseveration noted  Safety/Judgement: Fall prevention                          Physical Skills Involved:  1. Range of Motion  2. Balance  3. Strength  4. Activity Tolerance  5. Fine Motor Control  6. Gross Motor Control Cognitive Skills Affected (resulting in the inability to perform in a timely and safe manner):  1. Perception  2. Expression Psychosocial Skills Affected:  1. Habits/Routines  2. Environmental Adaptation  3. Social Interaction  4. Emotional Regulation  5. Self-Awareness  6. Awareness of Others  7. Social Roles   Number of elements that affect the Plan of Care: 5+:  HIGH COMPLEXITY   CLINICAL DECISION MAKIN[address] [place] [id number] AM-PAC 6 Clicks   Daily Activity Inpatient Short Form  How much help from another person does the patient currently need. .. Total A Lot A Little None   1. Putting on and taking off regular lower body clothing? [] 1   [] 2   [x] 3   [] 4   2. Bathing (including washing, rinsing, drying)? [] 1   [] 2   [x] 3   [] 4   3. Toileting, which includes using toilet, bedpan or urinal?   [] 1   [] 2   [x] 3   [] 4   4. Putting on and taking off regular upper body clothing? [] 1   [] 2   [x] 3   [] 4   5. Taking care of personal grooming such as brushing teeth? [] 1   [] 2   [x] 3   [] 4   6. Eating meals? [] 1   [] 2   [x] 3   [] 4   © , Trustees of [address] [place] [address] [address] [address], under license to [employer].  All rights reserved      Score:  Initial: 11 Most Recent: 18 (20)    Interpretation of Tool:  Represents activities that are increasingly more difficult (i.e. Bed mobility, Transfers, Gait). Medical Necessity:     · Patient demonstrates   · good and excellent  ·  rehab potential due to higher previous functional level. Reason for Services/Other Comments:  · Patient continues to require skilled intervention due to   · Decreased independence with ADL/functional transfers that impacts overall quality of life. · .   Use of outcome tool(s) and clinical judgement create a POC that gives a: MODERATE COMPLEXITY         TREATMENT:   (In addition to Assessment/Re-Assessment sessions the following treatments were rendered)     Pre-treatment Symptoms/Complaints: Pt reports no pain in L hand which he has been experiencing in prior sessions. Pain: Initial:   Pain Intensity 1: 0 Post Session: Unchanged     Self Care: (15 minutes): Procedure(s) utilized to improve and/or restore self-care/home management as related to dressing and toileting. Required minimal visual, verbal and manual cueing to facilitate activities of daily living skills. Pt requires CGA with use of luke walker to walk from bed to commode. Pt performs transfer onto commode with SBA. Pt requires SBA to transfer off of commode and CGA with use of luke walker to walk from commode to bed as pt brief noticed to be saturated when completing transfer. Pt begins to doff diaper brief in standing with SBA and performs wiping with SBA and no loss of balance noted. Pt requires SBA for stand to sit and to doff diaper brief remainder of the way. Pt requires Min A to don clean brief on L LE and pulls brief to knees with SBA. Pt requires SBA to complete sit to stand and to don diaper brief remainder of the way. Pt to able to manage gown while OT latches diaper brief more tightly. In seated, pt requires Supervision to doff L UE sling and SBA to doff soiled hospital gown . Pt requires Min A to to don clean hospital gown. Neuromuscular Re-education (0 minutes):    Focus on session on left UE function, PROM, self ROM, tendon glides. Left tendons tight and painful but still soft end feel with wrist extension/flexion and digit grasp. . Push and pull exercises with writer providing resistance at distal left UE as well. Making progress. Date:   4/22/2020 Date:  4/24/2020 Date:  4/27/2020 Date:   5/7/20   Activity/Exercise Parameters Parameters Parameters    Shoulder flexion SROM/AAROM       Elbow flexion/extension SROM/AAROM       Forearm supination/pronation        Wrist extension     20 reps AROM (partial ROM);  Attempted isometric extension but patient not able to hold position in extension    Finger flexion/extension     AAROM for tendon gliding (reports pain with flexion); 15-20 reps    Forward Reaching with soccer ball 20 reps (using two handed technique with R hand over left; therapist supporting L elbow) 20 reps (using two handed technique with R hand over left; therapist supporting L elbow) 25 reps (using two handed technique with R hand over left; therapist supporting L elbow) 20 reps using two handed technique and pushing forward for protraction/forward reaching (using washcloth)   Crossing Midline with soccer ball    20 reps each way (using two handed technique with R hand over left; therapist supporting L elbow) Pt completed with for 20 reps with support at the elbow and maximal facilitation from therapist to moving UE (using washcloth)   Shoulder Horizontal Abduction & Adduction with soccer ball 20 reps each way (using two handed technique with R hand over left; therapist supporting L elbow) 20 reps each way (using two handed technique with R hand over left; therapist supporting L elbow)     Shoulder Flexion & Crossing Midline with cones 14 reps (pt stabilized L wrist; therapist supporting L elbow) 14 reps (pt stabilized L wrist; therapist supporting L elbow) 14 reps (pt stabilized L wrist; therapist supporting L elbow) 5 reps with maximal assistance to support at the elbow and wrist   Digit Flexion & Extension with pegs 24 reps (using two handed technique with assist needed for object release) 24 reps (using two handed technique with decreased assist needed for object release) 24 reps (using two handed technique with decreased assist needed for object release)    1st CMC Flexion/Extension  10 reps (using yellow putty with Total A from therapist to complete) 15 reps (using yellow putty with Total A from therapist to complete)                        Date:  4/14/20  Date:  4/15/20 Date:   4/16/20 Date:  5/7/20   Activity/Exercise Parameters Parameters     Midline orientation sit to stand  Moderate facilitation at the L LE and for decreased rotation at the trunk      Midline sit to squat  Min to mod A w/ hands in his lap       Weightbearing into L side standing at sink        Forward reach with cane 10 reps with moderate facilitation at the elbow/scapula 15 reps with moderate facilitation at the elbow/scapula into protraction  15 reps with moderate facilitation at the elbow/scapula into protraction 25 reps with moderate facilitation at the elbow/scapula  (cane on the floor and pt pushing forward)   External rotation with cane 10 reps with minimal facilitation  15 reps with SBA/CGA  15 reps with SBA/CGA 25 reps with minimal facilitation (cane on the floor and elbow at his side)   Posture  Upright sitting/standing with verbal/visual cueing  Pt encouraged for upright posture with moderate cues throughout session  Pt encouraged for upright posture with moderate cues throughout session     Weightbearing into elbow  10 reps with moderate assistance pushing up into midline  2 sets with prolonged weight bearing and reaching to the L of midline for 2 sets x 15 reps  Sitting at the table with pt encouraged for propped elbows and weightbearing through the L with moderate cues     Weightbearing into hand Mod to maximal assistance with facilitation at the elbow; 2 sets x 10 reps       Elbow flexion 10 reps AAROM; 10 reps holding ball in B hands       Grasp release of washcloth  10 reps with moderate to maximal facilitation for reaching   4-5 reps with additional time     Trunk Rotation   15 reps with minima facilitation  15 reps with minimal facilitation and additional time            Side Scooting   Minimal facilitation and assistance for positioning and weightbearing of L Hand through his L knee and forward forward flexion at the trunk        Braces/Orthotics/Lines/Etc:   · O2 device: Room air  Treatment/Session Assessment:    Response to Treatment:  Pt is making slight improvement in acute care goals. · Interdisciplinary Collaboration:   o Occupational Therapist  o Registered Nurse  · After treatment position/precautions:   o Supine in bed  o Bed alarm/tab alert on  o Bed/Chair-wheels locked  o Bed in low position  o Call light within reach  o RN notified   · Compliance with Program/Exercises: Compliant all of the time, Will assess as treatment progresses. · Recommendations/Intent for next treatment session: \"Next visit will focus on advancements to more challenging activities and reduction in assistance provided\".   Total Treatment Duration:   OT Patient Time In/Time Out  Time In: 1455  Time Out: 1521     Mary Fairchild

## 2020-05-13 LAB
ANION GAP SERPL CALC-SCNC: 7 MMOL/L (ref 7–16)
BUN SERPL-MCNC: 20 MG/DL (ref 6–23)
CALCIUM SERPL-MCNC: 9.2 MG/DL (ref 8.3–10.4)
CHLORIDE SERPL-SCNC: 107 MMOL/L (ref 98–107)
CO2 SERPL-SCNC: 26 MMOL/L (ref 21–32)
CREAT SERPL-MCNC: 1.01 MG/DL (ref 0.8–1.5)
ERYTHROCYTE [DISTWIDTH] IN BLOOD BY AUTOMATED COUNT: 16 % (ref 11.9–14.6)
GLUCOSE SERPL-MCNC: 99 MG/DL (ref 65–100)
HCT VFR BLD AUTO: 35 % (ref 41.1–50.3)
HGB BLD-MCNC: 11.2 G/DL (ref 13.6–17.2)
MAGNESIUM SERPL-MCNC: 1.7 MG/DL (ref 1.8–2.4)
MCH RBC QN AUTO: 26.9 PG (ref 26.1–32.9)
MCHC RBC AUTO-ENTMCNC: 32 G/DL (ref 31.4–35)
MCV RBC AUTO: 83.9 FL (ref 79.6–97.8)
NRBC # BLD: 0 K/UL (ref 0–0.2)
PLATELET # BLD AUTO: 181 K/UL (ref 150–450)
PMV BLD AUTO: 11.6 FL (ref 9.4–12.3)
POTASSIUM SERPL-SCNC: 4.2 MMOL/L (ref 3.5–5.1)
RBC # BLD AUTO: 4.17 M/UL (ref 4.23–5.6)
SODIUM SERPL-SCNC: 140 MMOL/L (ref 136–145)
WBC # BLD AUTO: 5.6 K/UL (ref 4.3–11.1)

## 2020-05-13 PROCEDURE — 83735 ASSAY OF MAGNESIUM: CPT

## 2020-05-13 PROCEDURE — 74011250636 HC RX REV CODE- 250/636: Performed by: INTERNAL MEDICINE

## 2020-05-13 PROCEDURE — 74011250637 HC RX REV CODE- 250/637: Performed by: NURSE PRACTITIONER

## 2020-05-13 PROCEDURE — 65270000029 HC RM PRIVATE

## 2020-05-13 PROCEDURE — 80048 BASIC METABOLIC PNL TOTAL CA: CPT

## 2020-05-13 PROCEDURE — 97530 THERAPEUTIC ACTIVITIES: CPT

## 2020-05-13 PROCEDURE — 97535 SELF CARE MNGMENT TRAINING: CPT

## 2020-05-13 PROCEDURE — 36415 COLL VENOUS BLD VENIPUNCTURE: CPT

## 2020-05-13 PROCEDURE — 74011250637 HC RX REV CODE- 250/637: Performed by: INTERNAL MEDICINE

## 2020-05-13 PROCEDURE — 85027 COMPLETE CBC AUTOMATED: CPT

## 2020-05-13 PROCEDURE — 74011250637 HC RX REV CODE- 250/637: Performed by: HOSPITALIST

## 2020-05-13 PROCEDURE — 97112 NEUROMUSCULAR REEDUCATION: CPT

## 2020-05-13 RX ADMIN — ATORVASTATIN CALCIUM 80 MG: 80 TABLET, FILM COATED ORAL at 21:40

## 2020-05-13 RX ADMIN — METFORMIN HYDROCHLORIDE 500 MG: 500 TABLET ORAL at 07:57

## 2020-05-13 RX ADMIN — ACETAMINOPHEN 650 MG: 325 TABLET, FILM COATED ORAL at 21:40

## 2020-05-13 RX ADMIN — ACETAMINOPHEN 650 MG: 325 TABLET, FILM COATED ORAL at 05:02

## 2020-05-13 RX ADMIN — DIPHENHYDRAMINE HYDROCHLORIDE 25 MG: 25 CAPSULE ORAL at 17:08

## 2020-05-13 RX ADMIN — GUAIFENESIN 100 MG: 100 SOLUTION ORAL at 17:09

## 2020-05-13 RX ADMIN — ENOXAPARIN SODIUM 40 MG: 40 INJECTION SUBCUTANEOUS at 15:06

## 2020-05-13 RX ADMIN — Medication 400 MG: at 08:01

## 2020-05-13 RX ADMIN — ASPIRIN 81 MG 81 MG: 81 TABLET ORAL at 08:01

## 2020-05-13 RX ADMIN — Medication 400 MG: at 17:08

## 2020-05-13 RX ADMIN — ACETAMINOPHEN 650 MG: 325 TABLET, FILM COATED ORAL at 15:06

## 2020-05-13 RX ADMIN — LISINOPRIL 40 MG: 20 TABLET ORAL at 08:01

## 2020-05-13 RX ADMIN — METFORMIN HYDROCHLORIDE 500 MG: 500 TABLET ORAL at 17:08

## 2020-05-13 RX ADMIN — METOPROLOL SUCCINATE 25 MG: 25 TABLET, FILM COATED, EXTENDED RELEASE ORAL at 08:01

## 2020-05-13 NOTE — PROGRESS NOTES
Problem: Mobility Impaired (Adult and Pediatric)  Goal: *Acute Goals and Plan of Care  Description  GOALS REVIEWED ON 5/12/2020 (BOLD INDICATES MOST RECENTLY UPDATED GOAL):  1. Patient will perform bed mobility with MODIFIED INDEPENDENCE and 0 verbal cues within 7 treatment days. 2. Patient will perform transfer bed to chair/wheelchair with MODIFIED INDEPENDENCE within 7 treatment days. 3. Patient will demonstrate FAIR+ dynamic balance throughout ambulation within 7 treatment days. 4. Patient demonstrate 3+/5 strength in L LE hip flexion, hip abduction, and hip adduction within 7 treatment days. 5. Patient will ambulate 300ft+ with STAND BY ASSIST and least retrictive assistive device and L LE AFO within 7 treatment days. 6. Patient will perform wheelchair mobility 75 ft with modified independence within 7 treatment days. 7. Patient will don/doff LUE sling for protection of L shoulder during transfers/gait with set up within 7 treatment days. 8. Patient will instruct caregiver how to don/doff L LE AFO with independence within 7 treatment days. PHYSICAL THERAPY: Daily Note and PM 5/13/2020  INPATIENT: PT Visit Days : 2  [id number] [address] / Plan: [address] [place] [address] / Product Type: Medicaid /       NAME/AGE/GENDER: Daina Pinzon is a 53 y.o. male   PRIMARY DIAGNOSIS: Acute ischemic stroke ([facility] [I63.9]  Cerebrovascular accident (CVA) due to embolism ([address] [S89.6] Acute embolic stroke ([address] Acute embolic stroke ([facility]       ICD-10: Treatment Diagnosis:   · Difficulty in walking, Not elsewhere classified (R26.2)  · Hemiplegia and hemiparesis following cerebral infarction affecting left non-dominant side (O16.428)   Precaution/Allergies:  Patient has no known allergies. ASSESSMENT:     Mr. Henriquez is a 53year old admitted R MCA CVA. Patient was supine upon contact and agreeable to PT.  Patient performs supine to sit with SBA, additional time, and cues for improved/proper technique. Donned L LE AFO and tennis shoes with total assistance. Patient transferred to standing with CGA and cues for technique. Once standing patient ambulates 300ft then 100ft with hemiwalker and gait belt. Patient took a seated rest break in between ambulation bouts. Cues for gait mechanics, assistive device sequencing, and dynamic standing balance throughout. Mechanics much improved with L LE AFO, circumduction decreased, knee hyperextension decreased, and improved clearance during swing phase of gait. Patient returns to EOB and to supine with SBA. Overall slow progress towards physical therapy goals. Patient's goals listed above are still appropriate. Will continue skilled PT to address remaining deficits. This section established at most recent assessment   PROBLEM LIST (Impairments causing functional limitations):  1. Decreased Strength  2. Decreased ADL/Functional Activities  3. Decreased Transfer Abilities  4. Decreased Ambulation Ability/Technique  5. Decreased Balance  6. Increased Pain  7. Decreased Activity Tolerance  8. Increased Fatigue  9. Decreased Flexibility/Joint Mobility   INTERVENTIONS PLANNED: (Benefits and precautions of physical therapy have been discussed with the patient.)  1. Balance Exercise  2. Bed Mobility  3. Family Education  4. Gait Training  5. Home Exercise Program (HEP)  6. Manual Therapy  7. Neuromuscular Re-education/Strengthening  8. Range of Motion (ROM)  9. Therapeutic Activites  10. Therapeutic Exercise/Strengthening  11. Transfer Training     TREATMENT PLAN: Frequency/Duration: 5 times a week for duration of hospital stay  Rehabilitation Potential For Stated Goals: Good     REHAB RECOMMENDATIONS (at time of discharge pending progress):    Placement:   It is my opinion, based on this patient's performance to date, that Mr. Ellis Nair may benefit from intensive therapy at an [address] [place] [address] after discharge due to a probable need for close medical supervision by a rehab physician, a probable need for multiple therapy disciplines and potential to make ongoing and sustainable functional improvement that is of practical value. .  Equipment:    TBD pending progress with therapy. HISTORY:   History of Present Injury/Illness (Reason for Referral):  Per H&P: \"Pt is a 54 y/o smoker with DM, HTN, who presented to ER with L leg and arm weakness, L facial droop, dysarthria. First noted L leg weakness late night 12/11 when he woke up to go to the bathroom. He was normal when he went to bed around 1030. Woke up this morning and had persistent weakness L leg and also now noted in L arm. EMS called. Noted to have slurred speech and L facial droop as well. Code S called in ER around 9am.  MRI with acute infarcts in L cerebellar hemisphere, deep frontoparietal white matter, and R paramedian yariel. Also noted old lacunar infarcts. No large vessel occlusion on CTA, but some stenosis noted. No hx afib, TIA, CVA. No CP, palpitations, SOB. \"  Past Medical History/Comorbidities:   Mr. Teresa Christine  has a past medical history of Acute ischemic stroke ([address] (12/12/2019), Acute pancreatitis (11/19/2014), Cerebrovascular accident (CVA) due to embolism ([address] (12/12/2019), Diabetes ([address] (2002), Diabetes ([address] Diabetes mellitus, and Hypertension. He also has no past medical history of Arthritis, Asthma, Autoimmune disease ([address] CAD (coronary artery disease), Cancer ([address] Chronic kidney disease, COPD, Dementia, Dementia ([address] Heart failure ([address] Ill-defined condition, Infectious disease, Liver disease, Other ill-defined conditions(799.89), Psychiatric disorder, PUD (peptic ulcer disease), Seizures ([address] or Sleep disorder. Mr. Teresa Christine  has a past surgical history that includes hx hernia repair and hx orthopaedic.   Social History/Living Environment:   Home Environment: Apartment  # Steps to Enter: 12  Rails to Enter: Yes  [employer] : Bilateral  One/Two Story Residence: One story  Living Alone: No  Support Systems: Spouse/Significant Other/Partner  Patient Expects to be Discharged to[de-identified] Unknown  Current DME Used/Available at Home: None  Tub or Shower Type: Tub/Shower combination  Prior Level of Function/Work/Activity:  Independent, lives with wife in 2nd story 1 level apartment. No recent falls. Number of Personal Factors/Comorbidities that affect the Plan of Care: 1-2: MODERATE COMPLEXITY   EXAMINATION:   Most Recent Physical Functioning:   Gross Assessment: left hip grossly 2/5 to 3-/5                       Balance:  Sitting: Intact  Standing: Impaired  Standing - Static: Good  Standing - Dynamic : Fair Bed Mobility:  Supine to Sit: Stand-by assistance  Sit to Supine: Stand-by assistance  Wheelchair Mobility: NT     Transfers:  Sit to Stand: Stand-by assistance  Stand to Sit: Stand-by assistance  Gait:     Base of Support: Center of gravity altered;Narrowed  Speed/Clotilde: Slow  Step Length: Right shortened;Left shortened  Gait Abnormalities: Hemiplegic;Trunk sway increased  Distance (ft): (300' then 150)  Assistive Device: Walker luke;Gait belt;Brace/Splint  Ambulation - Level of Assistance: Contact guard assistance  Interventions: Safety awareness training; Tactile cues; Verbal cues      Body Structures Involved:  1. Nerves  2. Voice/Speech  3. Bones  4. Joints  5. Muscles Body Functions Affected:  1. Mental  2. Sensory/Pain  3. Neuromusculoskeletal  4. Movement Related Activities and Participation Affected:  1. General Tasks and Demands  2. Mobility  3. Self Care  4.  Interpersonal Interactions and Relationships   Number of elements that affect the Plan of Care: 4+: HIGH COMPLEXITY   CLINICAL PRESENTATION:   Presentation: Evolving clinical presentation with changing clinical characteristics: MODERATE COMPLEXITY   CLINICAL DECISION MAKIN [facility] Inpatient Short Form  How much difficulty does the patient currently have... Unable A Lot A Little None   1. Turning over in bed (including adjusting bedclothes, sheets and blankets)? [] 1   [] 2   [] 3   [x] 4   2. Sitting down on and standing up from a chair with arms ( e.g., wheelchair, bedside commode, etc.)   [] 1   [] 2   [x] 3   [] 4   3. Moving from lying on back to sitting on the side of the bed? [] 1   [] 2   [] 3   [x] 4   How much help from another person does the patient currently need. .. Total A Lot A Little None   4. Moving to and from a bed to a chair (including a wheelchair)? [] 1   [] 2   [x] 3   [] 4   5. Need to walk in hospital room? [] 1   [] 2   [x] 3   [] 4   6. Climbing 3-5 steps with a railing? [] 1   [x] 2   [] 3   [] 4   © 2007, Trustees of [facility] [employer], under license to [employer]. All rights reserved      Score:  Initial: 13 Most Recent: 19 (Date: 5/5/2020)    Interpretation of Tool:  Represents activities that are increasingly more difficult (i.e. Bed mobility, Transfers, Gait). Medical Necessity:     · Patient is expected to demonstrate progress in   · strength, range of motion, balance, coordination, and functional technique  ·  to   · increase independence with all mobility. · .  Reason for Services/Other Comments:  · Patient continues to require skilled intervention due to   · medical complications and mobility deficits which impact his level of function, safety, and independence as indicated above. · .   Use of outcome tool(s) and clinical judgement create a POC that gives a: Questionable prediction of patient's progress: MODERATE COMPLEXITY        TREATMENT:      Pre-treatment Symptoms/Complaints: see above  Pain: Initial: 0/10 Post Session:  0/10     Therapeutic Activity: (    30 Minutes):   Therapeutic activities including bed mobility training, transfer training from various surface heights, ambulation on level ground, static/dynamic standing balance, posture training, instruction in sequencing with luke walker, and patient education to improve mobility, strength and balance. Required moderate Safety awareness training; Tactile cues; Verbal cues to promote static and dynamic balance in standing, promote coordination of bilateral, lower extremity(s) and promote motor control of bilateral, lower extremity(s). Date:  5/12/20 Date:   Date:     Activity/Exercise Parameters Parameters Parameters   Supine hip internal rotation 10 BLE A     Supine quadriceps sets 10 2' hold BLE A     Supine SLR 10 AAROM BLE     bridges 10 AROM                           Braces/Orthotics/Lines/Etc:   · L LE AFO  Treatment/Session Assessment:    · Response to Treatment:  See above  · Interdisciplinary Collaboration:   o Physical Therapy Assistant  o Registered Nurse  o Rehabilitation Attendant  · After treatment position/precautions:   o Supine in bed  o Bed alarm/tab alert on  o Bed/Chair-wheels locked  o Bed in low position  o Call light within reach  o RN notified   · Compliance with Program/Exercises: Compliant all of the time  · Recommendations/Intent for next treatment session: \"Next visit will focus on advancements to more challenging activities and reduction in assistance provided\".     Total Treatment Duration:  PT Patient Time In/Time Out  Time In: 4759  Time Out: 2014 [facility]

## 2020-05-13 NOTE — PROGRESS NOTES
Hospitalist Progress Note    Subjective:   Daily Progress Note: 5/13/2020 8:44 AM    Patient admitted 12/12/19 with acute right side embolic stroke with left side residual weakness and left facial droop. CT brain on admission with indeterminate infarctions within the left corona radiata/caudate.  CTA of the head and neck with stenosis at the distal segment of the right  Vertebral artery and multifocal stenosis in the P2 segment of the left posterior cerebral artery.  MRI brain with acute to early subacute infarcts in the left cerebellar hemisphere in the periventricular deep left frontoparietal white matter and likely additional areas of restricted diffusion in the right paramedian yariel. Echo with + PFO, on statin and ASA.  Will need event monitor on discharge and if no arrhythmia, PFO closure recommended.  Wife informs CM she does not want patient at home, they were planning to separate prior to this stroke.  CM attempting  to place in STR, medicaid pending.    4/9:  Speech remains slightly slurred, SLP signing off as clinical indication no longer needed.    4/10: Mag critically low today: 1.3. Replaced   4/14:  Continues to wait for placement.  Good participation with PT/OT. Naif Coelho a little use of right hand, still unable to .    5/2:  Fitted for AFO today per orthotist.  No complaints.  Continued wait for medicaid and SSI approval.  Metformin dosing adjusted a few days ago.  Glucose 118-126 x 24 hours.  CM spoke with wife, still does not want patient to come home, even if he has to go to a shelter.    5/11: [facility] waiting for disability rating, Medicaid. Denita Eid this am. Lalitha Rodrigez in.  Participating with PT/OT, ambulated total of 400 feet. 5/13: sitting up in bed talking with Spiritual care. No complaints.       Current Facility-Administered Medications   Medication Dose Route Frequency    magnesium oxide (MAG-OX) tablet 400 mg  400 mg Oral BID    metFORMIN (GLUCOPHAGE) tablet 500 mg  500 mg Oral BID WITH MEALS    acetaminophen (TYLENOL) tablet 650 mg  650 mg Oral Q4H PRN    diphenhydrAMINE (BENADRYL) capsule 25 mg  25 mg Oral Q6H PRN    acetaminophen (TYLENOL) tablet 650 mg  650 mg Oral Q8H    lip protectant (BLISTEX) ointment 1 Each  1 Each Topical PRN    metoprolol succinate (TOPROL-XL) XL tablet 25 mg  25 mg Oral DAILY    polyethylene glycol (MIRALAX) packet 17 g  17 g Oral DAILY PRN    guaiFENesin (ROBITUSSIN) 100 mg/5 mL oral liquid 100 mg  100 mg Oral Q4H PRN    hydrALAZINE (APRESOLINE) 20 mg/mL injection 20 mg  20 mg IntraVENous Q4H PRN    atorvastatin (LIPITOR) tablet 80 mg  80 mg Oral QHS    lisinopril (PRINIVIL, ZESTRIL) tablet 40 mg  40 mg Oral DAILY    sodium chloride (NS) flush 5-40 mL  5-40 mL IntraVENous PRN    ondansetron (ZOFRAN) injection 4 mg  4 mg IntraVENous Q4H PRN    aspirin chewable tablet 81 mg  81 mg Oral DAILY    enoxaparin (LOVENOX) injection 40 mg  40 mg SubCUTAneous Q24H    dextrose 40% (GLUTOSE) oral gel 1 Tube  15 g Oral PRN    glucagon (GLUCAGEN) injection 1 mg  1 mg IntraMUSCular PRN    dextrose (D50W) injection syrg 12.5-25 g  25-50 mL IntraVENous PRN        Review of Systems  A comprehensive review of systems was negative except for that written in the HPI. Objective:     Visit Vitals  BP (!) 149/92 (BP 1 Location: Right arm, BP Patient Position: At rest)   Pulse 64   Temp 97.7 °F (36.5 °C)   Resp 16   Ht 5' 6\" (1.676 m)   Wt 79.8 kg (175 lb 14.4 oz)   SpO2 100%   BMI 28.39 kg/m²      O2 Device: Room air    Temp (24hrs), Av.8 °F (36.6 °C), Min:97.6 °F (36.4 °C), Max:98 °F (36.7 °C)     07 -  1900  In: 360 [P.O.:360]  Out: -     General appearance: Sleepy again today.  Oriented.  Denies need or new issues.    Head: Normocephalic, without obvious abnormality, atraumatic  Eyes: conjunctivae/corneas clear.  PERRL  Throat: Continued mild left facial weakness.  Lips, mucosa, and tongue normal. Teeth and gums normal  Neck: supple, symmetrical, trachea midline, no JVD  Lungs: clear to auscultation bilaterally  Heart: regular rate and rhythm, S1, S2 normal, no murmur, click, rub or gallop  Abdomen: soft, non-tender.  Bowel sounds normal. No masses,  no organomegaly  Extremities: Persistent left side upper and lower residual weakness, improved since admission.       Skin: Skin color, texture, turgor normal. No rashes or lesions  Neurologic: As above.     Additional comments: Notes,orders, test results, vitals reviewed     Data Review  Recent Results (from the past 24 hour(s))   CBC W/O DIFF    Collection Time: 20  5:29 AM   Result Value Ref Range    WBC 5.6 4.3 - 11.1 K/uL    RBC 4.17 (L) 4.23 - 5.6 M/uL    HGB 11.2 (L) 13.6 - 17.2 g/dL    HCT 35.0 (L) 41.1 - 50.3 %    MCV 83.9 79.6 - 97.8 FL    MCH 26.9 26.1 - 32.9 PG    MCHC 32.0 31.4 - 35.0 g/dL    RDW 16.0 (H) 11.9 - 14.6 %    PLATELET 239 743 - 473 K/uL    MPV 11.6 9.4 - 12.3 FL    ABSOLUTE NRBC 0.00 0.0 - 0.2 K/uL   MAGNESIUM    Collection Time: 20  5:29 AM   Result Value Ref Range    Magnesium 1.7 (L) 1.8 - 2.4 mg/dL   METABOLIC PANEL, BASIC    Collection Time: 20  5:29 AM   Result Value Ref Range    Sodium 140 136 - 145 mmol/L    Potassium 4.2 3.5 - 5.1 mmol/L    Chloride 107 98 - 107 mmol/L    CO2 26 21 - 32 mmol/L    Anion gap 7 7 - 16 mmol/L    Glucose 99 65 - 100 mg/dL    BUN 20 6 - 23 MG/DL    Creatinine 1.01 0.8 - 1.5 MG/DL    GFR est AA >60 >60 ml/min/1.73m2    GFR est non-AA >60 >60 ml/min/1.73m2    Calcium 9.2 8.3 - 10.4 MG/DL      56:  Ma.8:      Assessment/Plan:     Acute to early subacute infarcts in the left cerebellar hemisphere, in the periventricular deep left frontoparietal white matter and likely additional areas of restricted diffusion in the right paramedian yariel.  Old lacunar infarcts also.                  Making good progress with PT/OT                Speech and swallowing improved to the extent SLP signed off                 Pending placement after approved for  SSI and medicaid:  extended     Dysarthria due to stroke:  Improved immensely              SLP releasing 4/14      Difficult placement with marital discord prior to stroke:  Wife does not want him at home:  CM working on tirelessly              Now issues obtaining MR for [employer], needs SSI              CM spoke with wife 5/1, she still does not want patient to come home.      Hypertension:  Continue lisinopril, toprol XL     IDDM II:  Continue glucophage, diabetic diet.  A1C: 8.4, rechecking               adding SSI 4/15              DM education and  management continues to follow intermittently,              increased glucophage frequency 4/30              Discontinue SSI      Arrhythmia:  On beta blocker     PFO 12/17/2019:                Will need 4 week event monitor on    discharge               Will need PFO closure at some point after discharge per Cards      Hypomagnesemia:  Replace and recheck               Increasing daily 400 mg dose to bid 5/2.              Recheck mag 5/5/20: 1.8     Care Plan discussed with: Patient and Nurse    Signed By: Scarlet Funes NP     May 13, 2020

## 2020-05-13 NOTE — PROGRESS NOTES
Problem: Patient Education: Go to Patient Education Activity  Goal: Patient/Family Education  Outcome: Progressing Towards Goal     Problem: Pressure Injury - Risk of  Goal: *Prevention of pressure injury  Description: Document Dewey Scale and appropriate interventions in the flowsheet. Outcome: Progressing Towards Goal  Note: Pressure Injury Interventions:  Sensory Interventions: Assess changes in LOC    Moisture Interventions: Absorbent underpads, Check for incontinence Q2 hours and as needed, Limit adult briefs, Minimize layers    Activity Interventions: Increase time out of bed, Pressure redistribution bed/mattress(bed type), PT/OT evaluation    Mobility Interventions: HOB 30 degrees or less, Pressure redistribution bed/mattress (bed type), PT/OT evaluation    Nutrition Interventions: Document food/fluid/supplement intake    Friction and Shear Interventions: HOB 30 degrees or less                Problem: Patient Education: Go to Patient Education Activity  Goal: Patient/Family Education  Outcome: Progressing Towards Goal     Problem: Falls - Risk of  Goal: *Absence of Falls  Description: Document Jeanne Fall Risk and appropriate interventions in the flowsheet.   Outcome: Progressing Towards Goal  Note: Fall Risk Interventions:  Mobility Interventions: Bed/chair exit alarm, Communicate number of staff needed for ambulation/transfer, OT consult for ADLs, Patient to call before getting OOB, PT Consult for mobility concerns    Mentation Interventions: Door open when patient unattended    Medication Interventions: Bed/chair exit alarm, Patient to call before getting OOB, Teach patient to arise slowly    Elimination Interventions: Bed/chair exit alarm, Call light in reach, Patient to call for help with toileting needs, Stay With Me (per policy), Toilet paper/wipes in reach, Toileting schedule/hourly rounds    History of Falls Interventions: Bed/chair exit alarm         Problem: Patient Education: Go to Patient Education Activity  Goal: Patient/Family Education  Outcome: Progressing Towards Goal     Problem: Diabetes Self-Management  Goal: *Disease process and treatment process  Description: Define diabetes and identify own type of diabetes; list 3 options for treating diabetes. Outcome: Progressing Towards Goal  Goal: *Incorporating nutritional management into lifestyle  Description: Describe effect of type, amount and timing of food on blood glucose; list 3 methods for planning meals. Outcome: Progressing Towards Goal  Goal: *Incorporating physical activity into lifestyle  Description: State effect of exercise on blood glucose levels. Outcome: Progressing Towards Goal  Goal: *Developing strategies to promote health/change behavior  Description: Define the ABC's of diabetes; identify appropriate screenings, schedule and personal plan for screenings. Outcome: Progressing Towards Goal  Goal: *Using medications safely  Description: State effect of diabetes medications on diabetes; name diabetes medication taking, action and side effects. Outcome: Progressing Towards Goal  Goal: *Monitoring blood glucose, interpreting and using results  Description: Identify recommended blood glucose targets  and personal targets. Outcome: Progressing Towards Goal  Goal: *Prevention, detection, treatment of acute complications  Description: List symptoms of hyper- and hypoglycemia; describe how to treat low blood sugar and actions for lowering  high blood glucose level. Outcome: Progressing Towards Goal  Goal: *Prevention, detection and treatment of chronic complications  Description: Define the natural course of diabetes and describe the relationship of blood glucose levels to long term complications of diabetes.   Outcome: Progressing Towards Goal  Goal: *Developing strategies to address psychosocial issues  Description: Describe feelings about living with diabetes; identify support needed and support network  Outcome: Progressing Towards Goal     Problem: Patient Education: Go to Patient Education Activity  Goal: Patient/Family Education  Outcome: Progressing Towards Goal     Problem: Patient Education: Go to Patient Education Activity  Goal: Patient/Family Education  Outcome: Progressing Towards Goal     Problem: Nutrition Deficit  Goal: *Optimize nutritional status  Outcome: Progressing Towards Goal     Problem: Patient Education: Go to Patient Education Activity  Goal: Patient/Family Education  Outcome: Progressing Towards Goal     Problem: General Medical Care Plan  Goal: *Vital signs within specified parameters  Outcome: Progressing Towards Goal  Goal: *Labs within defined limits  Outcome: Progressing Towards Goal  Goal: *Absence of infection signs and symptoms  Description: Wash hand more often   Outcome: Progressing Towards Goal  Goal: *Optimal pain control at patient's stated goal  Outcome: Progressing Towards Goal  Goal: *Skin integrity maintained  Outcome: Progressing Towards Goal  Goal: *Fluid volume balance  Outcome: Progressing Towards Goal  Goal: *Optimize nutritional status  Outcome: Progressing Towards Goal  Goal: *Anxiety reduced or absent  Outcome: Progressing Towards Goal  Goal: *Progressive mobility and function (eg: ADL's)  Outcome: Progressing Towards Goal     Problem: Patient Education: Go to Patient Education Activity  Goal: Patient/Family Education  Outcome: Progressing Towards Goal     Problem: Patient Education: Go to Patient Education Activity  Goal: Patient/Family Education  Outcome: Progressing Towards Goal     Problem: Patient Education: Go to Patient Education Activity  Goal: Patient/Family Education  Outcome: Progressing Towards Goal     Problem: Patient Education: Go to Patient Education Activity  Goal: Patient/Family Education  Outcome: Progressing Towards Goal     Problem: Ischemic Stroke: Discharge Outcomes  Goal: *Verbalizes anxiety and depression are reduced or absent  Outcome: Progressing Towards Goal  Goal: *Verbalize understanding of risk factor modification(Stroke Metric)  Outcome: Progressing Towards Goal  Goal: *Hemodynamically stable  Outcome: Progressing Towards Goal  Goal: *Absence of aspiration pneumonia  Outcome: Progressing Towards Goal  Goal: *Aware of needed dietary changes  Outcome: Progressing Towards Goal  Goal: *Verbalize understanding of prescribed medications including anti-coagulants, anti-lipid, and/or anti-platelets(Stroke Metric)  Outcome: Progressing Towards Goal  Goal: *Tolerating diet  Outcome: Progressing Towards Goal  Goal: *Aware of follow-up diagnostics related to anticoagulants  Outcome: Progressing Towards Goal  Goal: *Ability to perform ADLs and demonstrates progressive mobility and function  Outcome: Progressing Towards Goal  Goal: *Absence of DVT(Stroke Metric)  Outcome: Progressing Towards Goal  Goal: *Absence of aspiration  Outcome: Progressing Towards Goal  Goal: *Optimal pain control at patient's stated goal  Outcome: Progressing Towards Goal  Goal: *Home safety concerns addressed  Outcome: Progressing Towards Goal  Goal: *Describes available resources and support systems  Outcome: Progressing Towards Goal  Goal: *Verbalizes understanding of activation of EMS(911) for stroke symptoms(Stroke Metric)  Outcome: Progressing Towards Goal  Goal: *Understands and describes signs and symptoms to report to providers(Stroke Metric)  Outcome: Progressing Towards Goal  Goal: *Neurolgocially stable (absence of additional neurological deficits)  Outcome: Progressing Towards Goal  Goal: *Verbalizes importance of follow-up with primary care physician(Stroke Metric)  Outcome: Progressing Towards Goal  Goal: *Smoking cessation discussed,if applicable(Stroke Metric)  Outcome: Progressing Towards Goal  Goal: *Depression screening completed(Stroke Metric)  Outcome: Progressing Towards Goal     Problem: Pain  Goal: *Control of Pain  Outcome: Progressing Towards Goal     Problem: Patient Education: Go to Patient Education Activity  Goal: Patient/Family Education  Outcome: Progressing Towards Goal     Problem: Patient Education: Go to Patient Education Activity  Goal: Patient/Family Education  Outcome: Progressing Towards Goal

## 2020-05-13 NOTE — PROGRESS NOTES
Problem: Self Care Deficits Care Plan (Adult)  Goal: *Acute Goals and Plan of Care (Insert Text)  Description  GOALS REVIEWED and UPDATED ON 5/12/2020 (BOLD INDICATES MOST RECENTLY UPDATED GOAL):  1. Patient will demonstrate appropriate safety awareness and protection of L UE during bed mobility and functional transfers with no verbal cues. CONTINUE   2. Patient will complete lower body bathing and dressing with Min A and adaptive equipment as needed. CONTINUE  3. Patient will complete upper body bathing and dressing with Supervision and adaptive equipment as needed. 4. Patient will complete weightbearing into the L UE with ADL tasks with minimal assistance to improve ability to use as a functional assist during ADL tasks. CONTINUE  5. Patient will demonstrate L UE SROM HEP within 7 days. CONTINUE  6. Pt will complete bed mobility with Mod I and no verbal cueing. CONTINUE  7. Pt will complete functional transfers with Supervision with equipment as needed. CONTINUE  8. Pt will don/doff UE sling with Mod I and no verbal cueing. CONTINUE  9. Pt will complete toileting with Supervision for toilet transfer and gown management. 10. Patient will participate in using L UE as a functional assist for ADL for 15 minutes with minimal facilitation. CONTINUE  11. Patient will complete sit to stand at midline with Supervision and cueing. 12. Patient will complete therapeutic exercises with L UE with minimal tactile cues for facilitation of the LUE. CONTINUE  13. Patient will don L LE AFO with SBA and minimal verbal cueing.     Outcome: Progressing Towards Goal     OCCUPATIONAL THERAPY: Daily Note and PM    5/13/2020  INPATIENT: OT Visit Days: 2  Payor: [id number] / Plan: [address] [place] [address] / Product Type: Medicaid /      NAME/AGE/GENDER: Scarlet Tellez is a 53 y.o. male   PRIMARY DIAGNOSIS:  Acute ischemic stroke ([facility] [I63.9]  Cerebrovascular accident (CVA) due to embolism ([facility] [I63.9] Acute embolic stroke ([facility] Acute embolic stroke ([facility]    OIZ-03: Treatment Diagnosis:    · Generalized Muscle Weakness (M62.81)  · Other lack of cordination (R27.8)  · Hemiplegia and hemiparesis following cerebral infarction affecting   · left non-dominant side (I69.354)  · Abnormal posture (R29.3)   Precautions/Allergies:    NO PULLING ON LUE   LUE in sling with shoulder joint approximated and supported, needs taping   Patient has no known allergies. ASSESSMENT:     Mr. Yvette Lindsey presents to the hospital with L sided weakness and acute ischemic CVA. MRI revealed acute to subacute L cerebellar and R paramedian yariel infarcts. 5/13/2020: Pt agreeable to OT - supine in bed. He was found in a wet brief/gown, so he was assisted with brief change. PCT in room and having patient roll versus bridge - patient refused to bridge to allow PCT to \"do it her way. \"  Pt. Becomes frustrated when asked to scoot forward more than once stating \"I'm trying to scoot. \" Patient worked on individual finger extension and shrugging. Able to grasp light object, but difficulty releasing. Will continue POC. This section established at most recent assessment   PROBLEM LIST (Impairments causing functional limitations):  1. Decreased Strength  2. Decreased ADL/Functional Activities  3. Decreased Transfer Abilities  4. Decreased Ambulation Ability/Technique  5. Decreased Balance  6. Decreased Activity Tolerance  7. Increased Fatigue  8. Decreased Flexibility/Joint Mobility  9. Decreased Knowledge of Precautions  10. Decreased [place] with Home Exercise Program   INTERVENTIONS PLANNED: (Benefits and precautions of occupational therapy have been discussed with the patient.)  1. Activities of daily living training  2. Adaptive equipment training  3. Balance training  4. Clothing management  5. Cognitive training  6. Donning&doffing training  7. Keanu tech training  8. Neuromuscular re-eduation  9. Therapeutic activity  10.  Therapeutic exercise     TREATMENT PLAN: Frequency/Duration: Follow patient 3 times per week to address above goals. Rehabilitation Potential For Stated Goals: Excellent     REHAB RECOMMENDATIONS (at time of discharge pending progress):    Placement: It is my opinion, based on this patient's performance to date, that Mr. Teresa Christine may benefit from intensive therapy at an [address] [place] [address] after discharge due to potential to make ongoing and sustainable functional improvement that is of practical value. Arlene Cordova Pt functioning far below independent baseline, demonstrating good improvement and participation. Pt would likely benefit greatly and increase independence from inpatient rehab stay. Equipment:    TBD               OCCUPATIONAL PROFILE AND HISTORY:   History of Present Injury/Illness (Reason for Referral):  See H&P  Past Medical History/Comorbidities:   Mr. Teresa Christine  has a past medical history of Acute ischemic stroke ([address] (12/12/2019), Acute pancreatitis (11/19/2014), Cerebrovascular accident (CVA) due to embolism ([address] (12/12/2019), Diabetes ([address] (2002), Diabetes ([address] Diabetes mellitus, and Hypertension. He also has no past medical history of Arthritis, Asthma, Autoimmune disease ([address] CAD (coronary artery disease), Cancer ([address] Chronic kidney disease, COPD, Dementia, Dementia ([address] Heart failure ([address] Ill-defined condition, Infectious disease, Liver disease, Other ill-defined conditions(799.89), Psychiatric disorder, PUD (peptic ulcer disease), Seizures ([address] or Sleep disorder. Mr. Teresa Christine  has a past surgical history that includes hx hernia repair and hx orthopaedic.   Social History/Living Environment:   Home Environment: Apartment  # Steps to Enter: 12  Rails to Enter: Yes  [employer] : Bilateral  One/Two Story Residence: One story  Living Alone: No  Support Systems: Spouse/Significant Other/Partner  Patient Expects to be Discharged to[de-identified] Unknown  Current DME Used/Available at Home: None  Tub or Shower Type: Tub/Shower combination  Prior Level of Function/Work/Activity:  Pt lives at home with his wife. Pt is typically independent with ADL/functional mobility. Pt does not drive. Pt was working part-time at [employer]. Personal Factors:          Age:  53 y.o. Past/Current Experience:  CVA with flaccid L side        Other factors that influence how disability is experienced by the patient:  multiple co-morbidities    Number of Personal Factors/Comorbidities that affect the Plan of Care: Extensive review of physical, cognitive, and psychosocial performance (3+):  HIGH COMPLEXITY   ASSESSMENT OF OCCUPATIONAL PERFORMANCE[de-identified]   Activities of Daily Living:   Basic ADLs (From Assessment) Complex ADLs (From Assessment)   Feeding: Setup  Oral Facial Hygiene/Grooming: Minimum assistance  Bathing: Minimum assistance, Moderate assistance  Upper Body Dressing: Minimum assistance  Lower Body Dressing: Moderate assistance  Toileting: Minimum assistance Instrumental ADL  Meal Preparation: Total assistance  Homemaking: Total assistance  Medication Management: Total assistance  Financial Management: Total assistance   Grooming/Bathing/Dressing Activities of Daily Living     Cognitive Retraining  Safety/Judgement: Awareness of environment   Upper Body Bathing  Bathing Assistance: Min A   Position Performed: Seated in chair        [address]: Moderate assistance(doing it PCT \"way\" per patient)  Bladder Hygiene: Moderate assistance  Clothing Management: Minimum assistance   [address] Gown: Minimum  assistance     Lower Body Dressing Assistance  Socks:  Total assistance (dependent) Bed/Mat Mobility  Rolling: Stand-by assistance  Supine to Sit: Stand-by assistance(including covers)  Sit to Supine: Stand-by assistance  Sit to Stand: Stand-by assistance  Stand to Sit: Stand-by assistance  Scooting: Contact guard assistance     Most Recent Physical Functioning:   Gross Assessment:                  Posture:     Balance:  Sitting: Intact  Standing: Impaired  Standing - Static: Good  Standing - Dynamic : Fair Bed Mobility:  Rolling: Stand-by assistance  Supine to Sit: Stand-by assistance(including covers)  Sit to Supine: Stand-by assistance  Scooting: Contact guard assistance  Wheelchair Mobility:     Transfers:  Sit to Stand: Stand-by assistance  Stand to Sit: Stand-by assistance            Patient Vitals for the past 6 hrs:   BP BP Patient Position SpO2 Pulse   20 1200 143/90 At rest 99 % 75   20 1600 119/85 At rest 97 % 81       Mental Status  Neurologic State: Alert  Orientation Level: Oriented to person, Oriented to place  Cognition: Follows commands  Perception: Appears intact  Perseveration: No perseveration noted  Safety/Judgement: Awareness of environment                          Physical Skills Involved:  1. Range of Motion  2. Balance  3. Strength  4. Activity Tolerance  5. Fine Motor Control  6. Gross Motor Control Cognitive Skills Affected (resulting in the inability to perform in a timely and safe manner):  1. Perception  2. Expression Psychosocial Skills Affected:  1. Habits/Routines  2. Environmental Adaptation  3. Social Interaction  4. Emotional Regulation  5. Self-Awareness  6. Awareness of Others  7. Social Roles   Number of elements that affect the Plan of Care: 5+:  HIGH COMPLEXITY   CLINICAL DECISION MAKIN [facility] [address] [address] AM-PAC 6 Clicks   Daily Activity Inpatient Short Form  How much help from another person does the patient currently need. .. Total A Lot A Little None   1. Putting on and taking off regular lower body clothing? [] 1   [] 2   [x] 3   [] 4   2. Bathing (including washing, rinsing, drying)? [] 1   [] 2   [x] 3   [] 4   3. Toileting, which includes using toilet, bedpan or urinal?   [] 1   [] 2   [x] 3   [] 4   4. Putting on and taking off regular upper body clothing? [] 1   [] 2   [x] 3   [] 4   5.   Taking care of personal grooming such as brushing teeth? [] 1   [] 2   [x] 3   [] 4   6. Eating meals? [] 1   [] 2   [x] 3   [] 4   © 2007, Trustees of [address] [place] [address] [address] [id number], under license to [employer]. All rights reserved      Score:  Initial: 11 Most Recent: 18 (5/12/20)    Interpretation of Tool:  Represents activities that are increasingly more difficult (i.e. Bed mobility, Transfers, Gait). Medical Necessity:     · Patient demonstrates   · good and excellent  ·  rehab potential due to higher previous functional level. Reason for Services/Other Comments:  · Patient continues to require skilled intervention due to   · Decreased independence with ADL/functional transfers that impacts overall quality of life. · .   Use of outcome tool(s) and clinical judgement create a POC that gives a: MODERATE COMPLEXITY         TREATMENT:   (In addition to Assessment/Re-Assessment sessions the following treatments were rendered)     Pre-treatment Symptoms/Complaints: Pt reports no pain in L hand which he has been experiencing in prior sessions. Pain: Initial:   Pain Intensity 1: 0 Post Session: Unchanged     Self Care: (8 minutes): Procedure(s) utilized to improve and/or restore self-care/home management as related to dressing and toileting. Required minimal visual, verbal and manual cueing to facilitate activities of daily living skills. Neuromuscular Re-education: (15 minutes):  Exercise/activities per grid below to improve balance, coordination, kinesthetic sense, posture and proprioception. Required minimal visual, verbal, manual and tactile cues to promote static and dynamic balance in sitting, promote coordination of left, upper extremity(s) and promote motor control of left, upper extremity(s). LUE Re-Education  Muscle Facilitation: 1.  individual finger extension 5-10 reps. 2. Individual finger/thumb abduction 3. shoulder shrug 3-4 sets. . 5. Supination/pronation 5-10 reps.  6. Triceps facilitation with hand in weighbearing position. Muscle Inhibition: 1. Metarcarpal stretches. 2. Thumb webspace stretch. 3. long finger flexor stretch. 4. Long finger extensor stretch. Date:   4/22/2020 Date:  4/24/2020 Date:  4/27/2020 Date:   5/7/20   Activity/Exercise Parameters Parameters Parameters    Shoulder flexion SROM/AAROM       Elbow flexion/extension SROM/AAROM       Forearm supination/pronation        Wrist extension     20 reps AROM (partial ROM);  Attempted isometric extension but patient not able to hold position in extension    Finger flexion/extension     AAROM for tendon gliding (reports pain with flexion); 15-20 reps    Forward Reaching with soccer ball 20 reps (using two handed technique with R hand over left; therapist supporting L elbow) 20 reps (using two handed technique with R hand over left; therapist supporting L elbow) 25 reps (using two handed technique with R hand over left; therapist supporting L elbow) 20 reps using two handed technique and pushing forward for protraction/forward reaching (using washcloth)   Crossing Midline with soccer ball    20 reps each way (using two handed technique with R hand over left; therapist supporting L elbow) Pt completed with for 20 reps with support at the elbow and maximal facilitation from therapist to moving UE (using washcloth)   Shoulder Horizontal Abduction & Adduction with soccer ball 20 reps each way (using two handed technique with R hand over left; therapist supporting L elbow) 20 reps each way (using two handed technique with R hand over left; therapist supporting L elbow)     Shoulder Flexion & Crossing Midline with cones 14 reps (pt stabilized L wrist; therapist supporting L elbow) 14 reps (pt stabilized L wrist; therapist supporting L elbow) 14 reps (pt stabilized L wrist; therapist supporting L elbow) 5 reps with maximal assistance to support at the elbow and wrist   Digit Flexion & Extension with pegs 24 reps (using two handed technique with assist needed for object release) 24 reps (using two handed technique with decreased assist needed for object release) 24 reps (using two handed technique with decreased assist needed for object release)    1st CMC Flexion/Extension  10 reps (using yellow putty with Total A from therapist to complete) 15 reps (using yellow putty with Total A from therapist to complete)                  LUE Re-Education  Muscle Facilitation: 1.  individual finger extension 5-10 reps. 2. Individual finger/thumb abduction 3. shoulder shrug 3-4 sets. . 5. Supination/pronation 5-10 reps. 6. Triceps facilitation with hand in weighbearing position. Muscle Inhibition: 1. Metarcarpal stretches. 2. Thumb webspace stretch. 3. long finger flexor stretch. 4. Long finger extensor stretch.       Date:  4/14/20  Date:  4/15/20 Date:   4/16/20 Date:  5/7/20   Activity/Exercise Parameters Parameters     Midline orientation sit to stand  Moderate facilitation at the L LE and for decreased rotation at the trunk      Midline sit to squat  Min to mod A w/ hands in his lap       Weightbearing into L side standing at sink        Forward reach with cane 10 reps with moderate facilitation at the elbow/scapula 15 reps with moderate facilitation at the elbow/scapula into protraction  15 reps with moderate facilitation at the elbow/scapula into protraction 25 reps with moderate facilitation at the elbow/scapula  (cane on the floor and pt pushing forward)   External rotation with cane 10 reps with minimal facilitation  15 reps with SBA/CGA  15 reps with SBA/CGA 25 reps with minimal facilitation (cane on the floor and elbow at his side)   Posture  Upright sitting/standing with verbal/visual cueing  Pt encouraged for upright posture with moderate cues throughout session  Pt encouraged for upright posture with moderate cues throughout session     Weightbearing into elbow  10 reps with moderate assistance pushing up into midline  2 sets with prolonged weight bearing and reaching to the L of midline for 2 sets x 15 reps  Sitting at the table with pt encouraged for propped elbows and weightbearing through the L with moderate cues     Weightbearing into hand Mod to maximal assistance with facilitation at the elbow; 2 sets x 10 reps       Elbow flexion 10 reps AAROM; 10 reps holding ball in B hands       Grasp release of washcloth  10 reps with moderate to maximal facilitation for reaching   4-5 reps with additional time     Trunk Rotation   15 reps with minima facilitation  15 reps with minimal facilitation and additional time            Side Scooting   Minimal facilitation and assistance for positioning and weightbearing of L Hand through his L knee and forward forward flexion at the trunk        Braces/Orthotics/Lines/Etc:   · O2 device: Room air  Treatment/Session Assessment:    Response to Treatment:  Pt is making slight improvement in acute care goals. · Interdisciplinary Collaboration:   o Occupational Therapist  o Registered Nurse  · After treatment position/precautions:   o Supine in bed  o Bed alarm/tab alert on  o Bed/Chair-wheels locked  o Bed in low position  o Call light within reach  o RN notified   · Compliance with Program/Exercises: Compliant all of the time, Will assess as treatment progresses. · Recommendations/Intent for next treatment session: \"Next visit will focus on advancements to more challenging activities and reduction in assistance provided\".   Total Treatment Duration:   OT Patient Time In/Time Out  Time In: 1538  Time Out: 235 S[address] Lawrence Se, OTR/L

## 2020-05-13 NOTE — PROGRESS NOTES
Problem: Patient Education: Go to Patient Education Activity  Goal: Patient/Family Education  Outcome: Progressing Towards Goal     Problem: Pressure Injury - Risk of  Goal: *Prevention of pressure injury  Description: Document Dewey Scale and appropriate interventions in the flowsheet. Outcome: Progressing Towards Goal  Note: Pressure Injury Interventions:  Sensory Interventions: Assess changes in LOC    Moisture Interventions: Absorbent underpads, Check for incontinence Q2 hours and as needed, Limit adult briefs    Activity Interventions: Increase time out of bed, Pressure redistribution bed/mattress(bed type)    Mobility Interventions: Pressure redistribution bed/mattress (bed type)    Nutrition Interventions: Document food/fluid/supplement intake    Friction and Shear Interventions: HOB 30 degrees or less                Problem: Patient Education: Go to Patient Education Activity  Goal: Patient/Family Education  Outcome: Progressing Towards Goal     Problem: Falls - Risk of  Goal: *Absence of Falls  Description: Document Jeanne Fall Risk and appropriate interventions in the flowsheet.   Outcome: Progressing Towards Goal  Note: Fall Risk Interventions:  Mobility Interventions: Bed/chair exit alarm, Communicate number of staff needed for ambulation/transfer, Patient to call before getting OOB    Mentation Interventions: Door open when patient unattended    Medication Interventions: Bed/chair exit alarm, Patient to call before getting OOB    Elimination Interventions: Bed/chair exit alarm, Call light in reach, Patient to call for help with toileting needs, Toileting schedule/hourly rounds    History of Falls Interventions: Bed/chair exit alarm, Door open when patient unattended         Problem: Patient Education: Go to Patient Education Activity  Goal: Patient/Family Education  Outcome: Progressing Towards Goal     Problem: Diabetes Self-Management  Goal: *Disease process and treatment process  Description: Define diabetes and identify own type of diabetes; list 3 options for treating diabetes. Outcome: Progressing Towards Goal  Goal: *Incorporating nutritional management into lifestyle  Description: Describe effect of type, amount and timing of food on blood glucose; list 3 methods for planning meals. Outcome: Progressing Towards Goal  Goal: *Incorporating physical activity into lifestyle  Description: State effect of exercise on blood glucose levels. Outcome: Progressing Towards Goal  Goal: *Developing strategies to promote health/change behavior  Description: Define the ABC's of diabetes; identify appropriate screenings, schedule and personal plan for screenings. Outcome: Progressing Towards Goal  Goal: *Using medications safely  Description: State effect of diabetes medications on diabetes; name diabetes medication taking, action and side effects. Outcome: Progressing Towards Goal  Goal: *Monitoring blood glucose, interpreting and using results  Description: Identify recommended blood glucose targets  and personal targets. Outcome: Progressing Towards Goal  Goal: *Prevention, detection, treatment of acute complications  Description: List symptoms of hyper- and hypoglycemia; describe how to treat low blood sugar and actions for lowering  high blood glucose level. Outcome: Progressing Towards Goal  Goal: *Prevention, detection and treatment of chronic complications  Description: Define the natural course of diabetes and describe the relationship of blood glucose levels to long term complications of diabetes.   Outcome: Progressing Towards Goal  Goal: *Developing strategies to address psychosocial issues  Description: Describe feelings about living with diabetes; identify support needed and support network  Outcome: Progressing Towards Goal     Problem: Patient Education: Go to Patient Education Activity  Goal: Patient/Family Education  Outcome: Progressing Towards Goal     Problem: Patient Education: Go to Patient Education Activity  Goal: Patient/Family Education  Outcome: Progressing Towards Goal     Problem: Nutrition Deficit  Goal: *Optimize nutritional status  Outcome: Progressing Towards Goal     Problem: Patient Education: Go to Patient Education Activity  Goal: Patient/Family Education  Outcome: Progressing Towards Goal     Problem: General Medical Care Plan  Goal: *Vital signs within specified parameters  Outcome: Progressing Towards Goal  Goal: *Labs within defined limits  Outcome: Progressing Towards Goal  Goal: *Absence of infection signs and symptoms  Description: Wash hand more often   Outcome: Progressing Towards Goal  Goal: *Optimal pain control at patient's stated goal  Outcome: Progressing Towards Goal  Goal: *Skin integrity maintained  Outcome: Progressing Towards Goal  Goal: *Fluid volume balance  Outcome: Progressing Towards Goal  Goal: *Optimize nutritional status  Outcome: Progressing Towards Goal  Goal: *Anxiety reduced or absent  Outcome: Progressing Towards Goal  Goal: *Progressive mobility and function (eg: ADL's)  Outcome: Progressing Towards Goal     Problem: Patient Education: Go to Patient Education Activity  Goal: Patient/Family Education  Outcome: Progressing Towards Goal     Problem: Patient Education: Go to Patient Education Activity  Goal: Patient/Family Education  Outcome: Progressing Towards Goal     Problem: Patient Education: Go to Patient Education Activity  Goal: Patient/Family Education  Outcome: Progressing Towards Goal     Problem: Patient Education: Go to Patient Education Activity  Goal: Patient/Family Education  Outcome: Progressing Towards Goal     Problem: Ischemic Stroke: Discharge Outcomes  Goal: *Verbalizes anxiety and depression are reduced or absent  Outcome: Progressing Towards Goal  Goal: *Verbalize understanding of risk factor modification(Stroke Metric)  Outcome: Progressing Towards Goal  Goal: *Hemodynamically stable  Outcome: Progressing Towards Goal  Goal: *Absence of aspiration pneumonia  Outcome: Progressing Towards Goal  Goal: *Aware of needed dietary changes  Outcome: Progressing Towards Goal  Goal: *Verbalize understanding of prescribed medications including anti-coagulants, anti-lipid, and/or anti-platelets(Stroke Metric)  Outcome: Progressing Towards Goal  Goal: *Tolerating diet  Outcome: Progressing Towards Goal  Goal: *Aware of follow-up diagnostics related to anticoagulants  Outcome: Progressing Towards Goal  Goal: *Ability to perform ADLs and demonstrates progressive mobility and function  Outcome: Progressing Towards Goal  Goal: *Absence of DVT(Stroke Metric)  Outcome: Progressing Towards Goal  Goal: *Absence of aspiration  Outcome: Progressing Towards Goal  Goal: *Optimal pain control at patient's stated goal  Outcome: Progressing Towards Goal  Goal: *Home safety concerns addressed  Outcome: Progressing Towards Goal  Goal: *Describes available resources and support systems  Outcome: Progressing Towards Goal  Goal: *Verbalizes understanding of activation of EMS(911) for stroke symptoms(Stroke Metric)  Outcome: Progressing Towards Goal  Goal: *Understands and describes signs and symptoms to report to providers(Stroke Metric)  Outcome: Progressing Towards Goal  Goal: *Neurolgocially stable (absence of additional neurological deficits)  Outcome: Progressing Towards Goal  Goal: *Verbalizes importance of follow-up with primary care physician(Stroke Metric)  Outcome: Progressing Towards Goal  Goal: *Smoking cessation discussed,if applicable(Stroke Metric)  Outcome: Progressing Towards Goal  Goal: *Depression screening completed(Stroke Metric)  Outcome: Progressing Towards Goal     Problem: Pain  Goal: *Control of Pain  Outcome: Progressing Towards Goal     Problem: Patient Education: Go to Patient Education Activity  Goal: Patient/Family Education  Outcome: Progressing Towards Goal     Problem: Patient Education: Go to Patient Education Activity  Goal: Patient/Family Education  Outcome: Progressing Towards Goal

## 2020-05-14 PROCEDURE — 97530 THERAPEUTIC ACTIVITIES: CPT

## 2020-05-14 PROCEDURE — 74011250637 HC RX REV CODE- 250/637: Performed by: NURSE PRACTITIONER

## 2020-05-14 PROCEDURE — 65270000029 HC RM PRIVATE

## 2020-05-14 PROCEDURE — 74011250637 HC RX REV CODE- 250/637: Performed by: INTERNAL MEDICINE

## 2020-05-14 PROCEDURE — 74011250637 HC RX REV CODE- 250/637: Performed by: HOSPITALIST

## 2020-05-14 PROCEDURE — 74011250636 HC RX REV CODE- 250/636: Performed by: INTERNAL MEDICINE

## 2020-05-14 RX ADMIN — ACETAMINOPHEN 650 MG: 325 TABLET, FILM COATED ORAL at 13:19

## 2020-05-14 RX ADMIN — ENOXAPARIN SODIUM 40 MG: 40 INJECTION SUBCUTANEOUS at 13:19

## 2020-05-14 RX ADMIN — ACETAMINOPHEN 650 MG: 325 TABLET, FILM COATED ORAL at 22:13

## 2020-05-14 RX ADMIN — METOPROLOL SUCCINATE 25 MG: 25 TABLET, FILM COATED, EXTENDED RELEASE ORAL at 09:17

## 2020-05-14 RX ADMIN — DIPHENHYDRAMINE HYDROCHLORIDE 25 MG: 25 CAPSULE ORAL at 17:25

## 2020-05-14 RX ADMIN — METFORMIN HYDROCHLORIDE 500 MG: 500 TABLET ORAL at 17:25

## 2020-05-14 RX ADMIN — ATORVASTATIN CALCIUM 80 MG: 80 TABLET, FILM COATED ORAL at 22:13

## 2020-05-14 RX ADMIN — GUAIFENESIN 100 MG: 100 SOLUTION ORAL at 17:25

## 2020-05-14 RX ADMIN — ACETAMINOPHEN 650 MG: 325 TABLET, FILM COATED ORAL at 05:39

## 2020-05-14 RX ADMIN — METFORMIN HYDROCHLORIDE 500 MG: 500 TABLET ORAL at 09:17

## 2020-05-14 RX ADMIN — Medication 400 MG: at 17:25

## 2020-05-14 RX ADMIN — ASPIRIN 81 MG 81 MG: 81 TABLET ORAL at 09:17

## 2020-05-14 RX ADMIN — LISINOPRIL 40 MG: 20 TABLET ORAL at 09:17

## 2020-05-14 RX ADMIN — Medication 400 MG: at 09:18

## 2020-05-14 NOTE — PROGRESS NOTES
Problem: Patient Education: Go to Patient Education Activity  Goal: Patient/Family Education  Outcome: Progressing Towards Goal     Problem: Pressure Injury - Risk of  Goal: *Prevention of pressure injury  Description: Document Dewey Scale and appropriate interventions in the flowsheet. Outcome: Progressing Towards Goal  Note: Pressure Injury Interventions:  Sensory Interventions: Assess changes in LOC    Moisture Interventions: Absorbent underpads, Check for incontinence Q2 hours and as needed    Activity Interventions: Increase time out of bed    Mobility Interventions: Pressure redistribution bed/mattress (bed type)    Nutrition Interventions: Document food/fluid/supplement intake, Offer support with meals,snacks and hydration    Friction and Shear Interventions: HOB 30 degrees or less                Problem: Patient Education: Go to Patient Education Activity  Goal: Patient/Family Education  Outcome: Progressing Towards Goal     Problem: Falls - Risk of  Goal: *Absence of Falls  Description: Document Jeanne Fall Risk and appropriate interventions in the flowsheet. Outcome: Progressing Towards Goal  Note: Fall Risk Interventions:  Mobility Interventions: Bed/chair exit alarm    Mentation Interventions: Door open when patient unattended    Medication Interventions: Bed/chair exit alarm    Elimination Interventions: Call light in reach    History of Falls Interventions: Door open when patient unattended         Problem: Patient Education: Go to Patient Education Activity  Goal: Patient/Family Education  Outcome: Progressing Towards Goal     Problem: Diabetes Self-Management  Goal: *Disease process and treatment process  Description: Define diabetes and identify own type of diabetes; list 3 options for treating diabetes.   Outcome: Progressing Towards Goal  Goal: *Incorporating nutritional management into lifestyle  Description: Describe effect of type, amount and timing of food on blood glucose; list 3 methods for planning meals. Outcome: Progressing Towards Goal  Goal: *Incorporating physical activity into lifestyle  Description: State effect of exercise on blood glucose levels. Outcome: Progressing Towards Goal  Goal: *Developing strategies to promote health/change behavior  Description: Define the ABC's of diabetes; identify appropriate screenings, schedule and personal plan for screenings. Outcome: Progressing Towards Goal  Goal: *Using medications safely  Description: State effect of diabetes medications on diabetes; name diabetes medication taking, action and side effects. Outcome: Progressing Towards Goal  Goal: *Monitoring blood glucose, interpreting and using results  Description: Identify recommended blood glucose targets  and personal targets. Outcome: Progressing Towards Goal  Goal: *Prevention, detection, treatment of acute complications  Description: List symptoms of hyper- and hypoglycemia; describe how to treat low blood sugar and actions for lowering  high blood glucose level. Outcome: Progressing Towards Goal  Goal: *Prevention, detection and treatment of chronic complications  Description: Define the natural course of diabetes and describe the relationship of blood glucose levels to long term complications of diabetes.   Outcome: Progressing Towards Goal  Goal: *Developing strategies to address psychosocial issues  Description: Describe feelings about living with diabetes; identify support needed and support network  Outcome: Progressing Towards Goal     Problem: Patient Education: Go to Patient Education Activity  Goal: Patient/Family Education  Outcome: Progressing Towards Goal     Problem: Patient Education: Go to Patient Education Activity  Goal: Patient/Family Education  Outcome: Progressing Towards Goal     Problem: Nutrition Deficit  Goal: *Optimize nutritional status  Outcome: Progressing Towards Goal     Problem: Patient Education: Go to Patient Education Activity  Goal: Patient/Family Education  Outcome: Progressing Towards Goal     Problem: General Medical Care Plan  Goal: *Vital signs within specified parameters  Outcome: Progressing Towards Goal  Goal: *Labs within defined limits  Outcome: Progressing Towards Goal  Goal: *Absence of infection signs and symptoms  Description: Wash hand more often   Outcome: Progressing Towards Goal  Goal: *Optimal pain control at patient's stated goal  Outcome: Progressing Towards Goal  Goal: *Skin integrity maintained  Outcome: Progressing Towards Goal  Goal: *Fluid volume balance  Outcome: Progressing Towards Goal  Goal: *Optimize nutritional status  Outcome: Progressing Towards Goal  Goal: *Anxiety reduced or absent  Outcome: Progressing Towards Goal  Goal: *Progressive mobility and function (eg: ADL's)  Outcome: Progressing Towards Goal     Problem: Patient Education: Go to Patient Education Activity  Goal: Patient/Family Education  Outcome: Progressing Towards Goal     Problem: Patient Education: Go to Patient Education Activity  Goal: Patient/Family Education  Outcome: Progressing Towards Goal     Problem: Patient Education: Go to Patient Education Activity  Goal: Patient/Family Education  Outcome: Progressing Towards Goal     Problem: Patient Education: Go to Patient Education Activity  Goal: Patient/Family Education  Outcome: Progressing Towards Goal     Problem: Pain  Goal: *Control of Pain  Outcome: Progressing Towards Goal     Problem: Patient Education: Go to Patient Education Activity  Goal: Patient/Family Education  Outcome: Progressing Towards Goal     Problem: Patient Education: Go to Patient Education Activity  Goal: Patient/Family Education  Outcome: Progressing Towards Goal

## 2020-05-14 NOTE — PROGRESS NOTES
Progress Note    2020  Admit Date: 2019  9:07 AM   NAME: Henry Smiley   :  1975   MRN:  [id number]   Attending: Marah Vogel MD  PCP:  Tania Closs, MD  Treatment Team: Attending Provider: Marah Vogel MD; Care Manager: Kevin Castillo LMSW; Utilization Review: Sara Oconnell RN; Nurse Practitioner: Osvaldo Carter NP; Care Manager: Juan Mayen RN; Hospitalist: Tejas Henriquez NP; Utilization Review: Jumana Mcdonnell RN; Staff Nurse: Tasia Ramirez; Nurse Practitioner: Zak Kaur NP; Charge Nurse: Anibal Weiss; Physical Therapy Assistant: Tobin Haque PTA    Full Code   SUBJECTIVE:     Mr. Breanna Colbert is a 52 yo male with PMH of DM, HTN who presented with c/o left sided weakness and left facial droop 19.   CT head showed age indeterminate infarctions within the left corona radiata/caudate.  CTA head/neck showed stenosis at the distal segment of the right vertebral artery and multifocal stenosis in the P2 segment of the left posterior cerebral artery. MRI brain showed acute to early subacute infarcts in the left cerebellar hemisphere, in the periventricular deep left frontoparietal white matter and likely additional areas of restricted diffusion in the right paramedian yariel.   Echo +PFO.  On statin, ASA.  Has been difficult to place in STR. Plans for 4 week event monitor on DC and if no arrhythmia PFO closure recommended. Pt remains stable today. Awaiting placement, medicaid pending. Denies complaints. Progressing well with PT.       Past Medical History:   Diagnosis Date    Acute ischemic stroke ([address] 2019    Acute pancreatitis 2014    Cerebrovascular accident (CVA) due to embolism ([address] 2019    Diabetes ([facility] [address] 2002    Diabetes ([facility] [address]     Diabetes mellitus     Hypertension      No results found for this or any previous visit (from the past 25 hour(s)).   No Known Allergies  Current Facility-Administered Medications   Medication Dose Route Frequency Provider Last Rate Last Dose    magnesium oxide (MAG-OX) tablet 400 mg  400 mg Oral BID ReinierTea dela cruz, NP   400 mg at 05/14/20 2932    metFORMIN (GLUCOPHAGE) tablet 500 mg  500 mg Oral BID WITH MEALS Sterling Rivas NP   500 mg at 05/14/20 7637    acetaminophen (TYLENOL) tablet 650 mg  650 mg Oral Q4H PRN Courtney Mandujano MD   650 mg at 05/04/20 8898    diphenhydrAMINE (BENADRYL) capsule 25 mg  25 mg Oral Q6H PRN Ragini Chandra MD   25 mg at 05/13/20 1708    acetaminophen (TYLENOL) tablet 650 mg  650 mg Oral Cesilia Lou MD   650 mg at 05/14/20 1319    lip protectant (BLISTEX) ointment 1 Each  1 Each Topical PRN Bao Nathan MD        metoprolol succinate (TOPROL-XL) XL tablet 25 mg  25 mg Oral DAILY Courtney Mandujano MD   25 mg at 05/14/20 0917    polyethylene glycol (MIRALAX) packet 17 g  17 g Oral DAILY PRN Laverne TARIQ DO   17 g at 05/11/20 0539    guaiFENesin (ROBITUSSIN) 100 mg/5 mL oral liquid 100 mg  100 mg Oral Q4H PRN Courtney Mandujano MD   100 mg at 05/13/20 1709    hydrALAZINE (APRESOLINE) 20 mg/mL injection 20 mg  20 mg IntraVENous Q4H PRN Dora Antony MD   20 mg at 04/17/20 0510    atorvastatin (LIPITOR) tablet 80 mg  80 mg Oral QHS Dora Antony MD   80 mg at 05/13/20 2140    lisinopril (PRINIVIL, ZESTRIL) tablet 40 mg  40 mg Oral DAILY Dora Antony MD   40 mg at 05/14/20 9848    sodium chloride (NS) flush 5-40 mL  5-40 mL IntraVENous PRN Dora Antony MD   5 mL at 05/11/20 1505    ondansetron (ZOFRAN) injection 4 mg  4 mg IntraVENous Q4H PRN Dora Antony MD        aspirin chewable tablet 81 mg  81 mg Oral DAILY Dora Antony MD   81 mg at 05/14/20 8431    enoxaparin (LOVENOX) injection 40 mg  40 mg SubCUTAneous Q24H Dora Antony MD   40 mg at 05/14/20 1319    dextrose 40% (GLUTOSE) oral gel 1 Tube  15 g Oral PRN Dora Antony MD        glucagon [facility]) injection 1 mg  1 mg IntraMUSCular PRN Dolores Up MD        dextrose (D50W) injection syrg 12.5-25 g  25-50 mL IntraVENous PRN Dolores Up MD           Review of Systems negative with exception of pertinent positives noted above  PHYSICAL EXAM     Visit Vitals  /87 (BP 1 Location: Right arm, BP Patient Position: At rest)   Pulse 76   Temp 98.3 °F (36.8 °C)   Resp 16   Ht 5' 6\" (1.676 m)   Wt 79.8 kg (175 lb 14.4 oz)   SpO2 98%   BMI 28.39 kg/m²      Temp (24hrs), Av.2 °F (36.8 °C), Min:97.9 °F (36.6 °C), Max:98.9 °F (37.2 °C)    Oxygen Therapy  O2 Sat (%): 98 % (20 1200)  Pulse via Oximetry: 75 beats per minute (20 0400)  O2 Device: Room air (20 1648)  No intake or output data in the 24 hours ending 20 1442     General:       No acute distress    Lungs:          CTA bilaterally. Resp even and nonlabored  Heart:            S1S2 present without murmurs rubs gallops. RRR. No LE edema  Abdomen:    Soft, non tender, non distended. BS present  Extremities: No cyanosis. Left sided hemiparesis  Neurologic:  moves right hand and raises arm at elbow moderately, unable to squeeze my fingers left hand, mildly slurred speech.  PERRLA, strength 4/5 RUE/RLE. Results summary of Diagnostic Studies/Procedures copied from within [facility] EMR:      Edmar Dixon 96 Problems    Diagnosis Date Noted    Hypomagnesemia 2020    PFO (patent foramen ovale) 2019    Arrhythmia 12/15/2019    Hyperlipemia 181    Acute embolic stroke ([facility] [address] 569    Type 2 diabetes mellitus ([facility] [address]     Hypertension      Plan:  Acute embolic stroke  MRI brain showed acute to early subacute infarcts in the left cerebellar hemisphere, in the periventricular deep left frontoparietal white matter and likely additional areas of restricted diffusion in the right paramedian yariel.    +PFO on echo  On ASA, statin  Will need 4 week event monitor on DC, if no arrhythmia then will need PFO closure      Hypomagnesemia   on oral supplementation       Hypertension  Continue metoprolol and ACE inhibitor  Goal BP <130/80     Type 2 diabetes mellitus:    Monitor, BGL controlled   A1C 6.9      Medically stable for DC.  Awaiting Medicaid approval.         Notes, labs, VS, diagnostic testing reviewed  Time spent with pt 20 min           DVT Prophylaxis: lovenox    Plan of Care Discussed with: Supervising MD Dr. Michael Delvalle, care team, pt       Wesly Led, NP     Pt updates wife daily. Does not need me to call per pt.

## 2020-05-14 NOTE — PROGRESS NOTES
Problem: Patient Education: Go to Patient Education Activity  Goal: Patient/Family Education  Outcome: Progressing Towards Goal     Problem: Pressure Injury - Risk of  Goal: *Prevention of pressure injury  Description: Document Dewey Scale and appropriate interventions in the flowsheet. Outcome: Progressing Towards Goal  Note: Pressure Injury Interventions:  Sensory Interventions: Assess changes in LOC    Moisture Interventions: Absorbent underpads, Check for incontinence Q2 hours and as needed    Activity Interventions: Increase time out of bed    Mobility Interventions: Pressure redistribution bed/mattress (bed type)    Nutrition Interventions: Document food/fluid/supplement intake    Friction and Shear Interventions: HOB 30 degrees or less                Problem: Patient Education: Go to Patient Education Activity  Goal: Patient/Family Education  Outcome: Progressing Towards Goal     Problem: Falls - Risk of  Goal: *Absence of Falls  Description: Document Jeanne Fall Risk and appropriate interventions in the flowsheet. Outcome: Progressing Towards Goal  Note: Fall Risk Interventions:  Mobility Interventions: Bed/chair exit alarm    Mentation Interventions: Door open when patient unattended    Medication Interventions: Bed/chair exit alarm, Patient to call before getting OOB    Elimination Interventions: Bed/chair exit alarm, Call light in reach, Patient to call for help with toileting needs    History of Falls Interventions: Door open when patient unattended, Bed/chair exit alarm         Problem: Patient Education: Go to Patient Education Activity  Goal: Patient/Family Education  Outcome: Progressing Towards Goal     Problem: Diabetes Self-Management  Goal: *Disease process and treatment process  Description: Define diabetes and identify own type of diabetes; list 3 options for treating diabetes.   Outcome: Progressing Towards Goal  Goal: *Incorporating nutritional management into lifestyle  Description: Describe effect of type, amount and timing of food on blood glucose; list 3 methods for planning meals. Outcome: Progressing Towards Goal  Goal: *Incorporating physical activity into lifestyle  Description: State effect of exercise on blood glucose levels. Outcome: Progressing Towards Goal  Goal: *Developing strategies to promote health/change behavior  Description: Define the ABC's of diabetes; identify appropriate screenings, schedule and personal plan for screenings. Outcome: Progressing Towards Goal  Goal: *Using medications safely  Description: State effect of diabetes medications on diabetes; name diabetes medication taking, action and side effects. Outcome: Progressing Towards Goal  Goal: *Monitoring blood glucose, interpreting and using results  Description: Identify recommended blood glucose targets  and personal targets. Outcome: Progressing Towards Goal  Goal: *Prevention, detection, treatment of acute complications  Description: List symptoms of hyper- and hypoglycemia; describe how to treat low blood sugar and actions for lowering  high blood glucose level. Outcome: Progressing Towards Goal  Goal: *Prevention, detection and treatment of chronic complications  Description: Define the natural course of diabetes and describe the relationship of blood glucose levels to long term complications of diabetes.   Outcome: Progressing Towards Goal  Goal: *Developing strategies to address psychosocial issues  Description: Describe feelings about living with diabetes; identify support needed and support network  Outcome: Progressing Towards Goal     Problem: Patient Education: Go to Patient Education Activity  Goal: Patient/Family Education  Outcome: Progressing Towards Goal     Problem: Patient Education: Go to Patient Education Activity  Goal: Patient/Family Education  Outcome: Progressing Towards Goal     Problem: Nutrition Deficit  Goal: *Optimize nutritional status  Outcome: Progressing Towards Goal Problem: Patient Education: Go to Patient Education Activity  Goal: Patient/Family Education  Outcome: Progressing Towards Goal     Problem: General Medical Care Plan  Goal: *Vital signs within specified parameters  Outcome: Progressing Towards Goal  Goal: *Labs within defined limits  Outcome: Progressing Towards Goal  Goal: *Absence of infection signs and symptoms  Description: Wash hand more often   Outcome: Progressing Towards Goal  Goal: *Optimal pain control at patient's stated goal  Outcome: Progressing Towards Goal  Goal: *Skin integrity maintained  Outcome: Progressing Towards Goal  Goal: *Fluid volume balance  Outcome: Progressing Towards Goal  Goal: *Optimize nutritional status  Outcome: Progressing Towards Goal  Goal: *Anxiety reduced or absent  Outcome: Progressing Towards Goal  Goal: *Progressive mobility and function (eg: ADL's)  Outcome: Progressing Towards Goal     Problem: Patient Education: Go to Patient Education Activity  Goal: Patient/Family Education  Outcome: Progressing Towards Goal     Problem: Patient Education: Go to Patient Education Activity  Goal: Patient/Family Education  Outcome: Progressing Towards Goal     Problem: Patient Education: Go to Patient Education Activity  Goal: Patient/Family Education  Outcome: Progressing Towards Goal     Problem: Patient Education: Go to Patient Education Activity  Goal: Patient/Family Education  Outcome: Progressing Towards Goal     Problem: Ischemic Stroke: Discharge Outcomes  Goal: *Verbalizes anxiety and depression are reduced or absent  Outcome: Progressing Towards Goal  Goal: *Verbalize understanding of risk factor modification(Stroke Metric)  Outcome: Progressing Towards Goal  Goal: *Hemodynamically stable  Outcome: Progressing Towards Goal  Goal: *Absence of aspiration pneumonia  Outcome: Progressing Towards Goal  Goal: *Aware of needed dietary changes  Outcome: Progressing Towards Goal  Goal: *Verbalize understanding of prescribed medications including anti-coagulants, anti-lipid, and/or anti-platelets(Stroke Metric)  Outcome: Progressing Towards Goal  Goal: *Tolerating diet  Outcome: Progressing Towards Goal  Goal: *Aware of follow-up diagnostics related to anticoagulants  Outcome: Progressing Towards Goal  Goal: *Ability to perform ADLs and demonstrates progressive mobility and function  Outcome: Progressing Towards Goal  Goal: *Absence of DVT(Stroke Metric)  Outcome: Progressing Towards Goal  Goal: *Absence of aspiration  Outcome: Progressing Towards Goal  Goal: *Optimal pain control at patient's stated goal  Outcome: Progressing Towards Goal  Goal: *Home safety concerns addressed  Outcome: Progressing Towards Goal  Goal: *Describes available resources and support systems  Outcome: Progressing Towards Goal  Goal: *Verbalizes understanding of activation of EMS(911) for stroke symptoms(Stroke Metric)  Outcome: Progressing Towards Goal  Goal: *Understands and describes signs and symptoms to report to providers(Stroke Metric)  Outcome: Progressing Towards Goal  Goal: *Neurolgocially stable (absence of additional neurological deficits)  Outcome: Progressing Towards Goal  Goal: *Verbalizes importance of follow-up with primary care physician(Stroke Metric)  Outcome: Progressing Towards Goal  Goal: *Smoking cessation discussed,if applicable(Stroke Metric)  Outcome: Progressing Towards Goal  Goal: *Depression screening completed(Stroke Metric)  Outcome: Progressing Towards Goal     Problem: Pain  Goal: *Control of Pain  Outcome: Progressing Towards Goal     Problem: Patient Education: Go to Patient Education Activity  Goal: Patient/Family Education  Outcome: Progressing Towards Goal     Problem: Patient Education: Go to Patient Education Activity  Goal: Patient/Family Education  Outcome: Progressing Towards Goal

## 2020-05-14 NOTE — PROGRESS NOTES
Late entry progress note for visit on 5.13.20:   's follow-up visit to convey care and concern. Offered spiritual interventions including prayer.       Dontrell Painter 66  Board Certified

## 2020-05-14 NOTE — PROGRESS NOTES
Problem: Mobility Impaired (Adult and Pediatric)  Goal: *Acute Goals and Plan of Care  Description  GOALS REVIEWED ON 5/12/2020 (BOLD INDICATES MOST RECENTLY UPDATED GOAL):  1. Patient will perform bed mobility with MODIFIED INDEPENDENCE and 0 verbal cues within 7 treatment days. 2. Patient will perform transfer bed to chair/wheelchair with MODIFIED INDEPENDENCE within 7 treatment days. 3. Patient will demonstrate FAIR+ dynamic balance throughout ambulation within 7 treatment days. 4. Patient demonstrate 3+/5 strength in L LE hip flexion, hip abduction, and hip adduction within 7 treatment days. 5. Patient will ambulate 300ft+ with STAND BY ASSIST and least retrictive assistive device and L LE AFO within 7 treatment days. 6. Patient will perform wheelchair mobility 75 ft with modified independence within 7 treatment days. 7. Patient will don/doff LUE sling for protection of L shoulder during transfers/gait with set up within 7 treatment days. 8. Patient will instruct caregiver how to don/doff L LE AFO with independence within 7 treatment days. PHYSICAL THERAPY: Daily Note and PM 5/14/2020  INPATIENT: PT Visit Days : 3  [facility] [id number] [facility] [address] / Plan: [address] [place] [address] / Product Type: Medicaid /       NAME/AGE/GENDER: Maya Kelley is a 53 y.o. male   PRIMARY DIAGNOSIS: Acute ischemic stroke ([facility] [I63.9]  Cerebrovascular accident (CVA) due to embolism ([address] [A32.4] Acute embolic stroke ([address] Acute embolic stroke ([facility]       ICD-10: Treatment Diagnosis:   · Difficulty in walking, Not elsewhere classified (R26.2)  · Hemiplegia and hemiparesis following cerebral infarction affecting left non-dominant side (B60.478)   Precaution/Allergies:  Patient has no known allergies. ASSESSMENT:     Mr. Henriquez is a 53year old admitted R MCA CVA. Patient was supine upon contact and agreeable to PT.  Instructed patient to put the head of the bed down and scoot up in bed prior to performing supine to sit as it is more difficult for him to perform when he has slid down in the bed. Patient performs supine to sit with mod I, additional time, and cues for improved/proper technique. Donned L LE AFO and tennis shoes with total assistance. Patient transferred to standing with CGA and cues for technique. Once standing patient ambulates 300ft then 100ft with hemiwalker and gait belt. Patient took a seated rest break in between ambulation bouts. Cues for gait mechanics, assistive device sequencing, and dynamic standing balance throughout. Mechanics much improved with L LE AFO, circumduction decreased, knee hyperextension decreased, and improved clearance during swing phase of gait. Overall slow progress towards physical therapy goals. Patient's goals listed above are still appropriate. Will continue skilled PT to address remaining deficits. This section established at most recent assessment   PROBLEM LIST (Impairments causing functional limitations):  1. Decreased Strength  2. Decreased ADL/Functional Activities  3. Decreased Transfer Abilities  4. Decreased Ambulation Ability/Technique  5. Decreased Balance  6. Increased Pain  7. Decreased Activity Tolerance  8. Increased Fatigue  9. Decreased Flexibility/Joint Mobility   INTERVENTIONS PLANNED: (Benefits and precautions of physical therapy have been discussed with the patient.)  1. Balance Exercise  2. Bed Mobility  3. Family Education  4. Gait Training  5. Home Exercise Program (HEP)  6. Manual Therapy  7. Neuromuscular Re-education/Strengthening  8. Range of Motion (ROM)  9. Therapeutic Activites  10. Therapeutic Exercise/Strengthening  11. Transfer Training     TREATMENT PLAN: Frequency/Duration: 5 times a week for duration of hospital stay  Rehabilitation Potential For Stated Goals: Good     REHAB RECOMMENDATIONS (at time of discharge pending progress):    Placement:   It is my opinion, based on this patient's performance to date, that  Jonathon Smith may benefit from intensive therapy at an [address] [place] [address] after discharge due to a probable need for close medical supervision by a rehab physician, a probable need for multiple therapy disciplines and potential to make ongoing and sustainable functional improvement that is of practical value. .  Equipment:    TBD pending progress with therapy. HISTORY:   History of Present Injury/Illness (Reason for Referral):  Per H&P: \"Pt is a 52 y/o smoker with DM, HTN, who presented to ER with L leg and arm weakness, L facial droop, dysarthria. First noted L leg weakness late night 12/11 when he woke up to go to the bathroom. He was normal when he went to bed around 1030. Woke up this morning and had persistent weakness L leg and also now noted in L arm. EMS called. Noted to have slurred speech and L facial droop as well. Code S called in ER around 9am.  MRI with acute infarcts in L cerebellar hemisphere, deep frontoparietal white matter, and R paramedian yariel. Also noted old lacunar infarcts. No large vessel occlusion on CTA, but some stenosis noted. No hx afib, TIA, CVA. No CP, palpitations, SOB. \"  Past Medical History/Comorbidities:   Mr. Jonathon Smith  has a past medical history of Acute ischemic stroke ([address] (12/12/2019), Acute pancreatitis (11/19/2014), Cerebrovascular accident (CVA) due to embolism ([address] (12/12/2019), Diabetes ([address] (2002), Diabetes ([address] Diabetes mellitus, and Hypertension. He also has no past medical history of Arthritis, Asthma, Autoimmune disease ([address] CAD (coronary artery disease), Cancer ([address] Chronic kidney disease, COPD, Dementia, Dementia ([address] Heart failure ([address] Ill-defined condition, Infectious disease, Liver disease, Other ill-defined conditions(799.89), Psychiatric disorder, PUD (peptic ulcer disease), Seizures ([address] or Sleep disorder. Mr. Jonathon Smith  has a past surgical history that includes hx hernia repair and hx orthopaedic.   Social History/Living Environment:   Home Environment: Apartment  # Steps to Enter: 12  Rails to Enter: Yes  Hand Rails : Bilateral  One/Two Story Residence: One story  Living Alone: No  Support Systems: Spouse/Significant Other/Partner  Patient Expects to be Discharged to[de-identified] Unknown  Current DME Used/Available at Home: None  Tub or Shower Type: Tub/Shower combination  Prior Level of Function/Work/Activity:  Independent, lives with wife in 2nd story 1 level apartment. No recent falls. Number of Personal Factors/Comorbidities that affect the Plan of Care: 1-2: MODERATE COMPLEXITY   EXAMINATION:   Most Recent Physical Functioning:   Gross Assessment: left hip grossly 2/5 to 3-/5                       Balance:  Sitting: Intact  Standing: Impaired  Standing - Static: Good  Standing - Dynamic : Fair Bed Mobility:  Supine to Sit: Modified independent  Wheelchair Mobility: NT     Transfers:  Sit to Stand: Stand-by assistance  Stand to Sit: Stand-by assistance  Gait:     Base of Support: Center of gravity altered;Narrowed  Speed/Clotilde: Slow  Step Length: Left shortened;Right shortened  Gait Abnormalities: Hemiplegic;Trunk sway increased  Distance (ft): (300' then 100')  Assistive Device: Walker luke;Brace/Splint;Gait belt  Ambulation - Level of Assistance: Contact guard assistance;Stand-by assistance  Interventions: Safety awareness training; Tactile cues; Verbal cues      Body Structures Involved:  1. Nerves  2. Voice/Speech  3. Bones  4. Joints  5. Muscles Body Functions Affected:  1. Mental  2. Sensory/Pain  3. Neuromusculoskeletal  4. Movement Related Activities and Participation Affected:  1. General Tasks and Demands  2. Mobility  3. Self Care  4.  Interpersonal Interactions and Relationships   Number of elements that affect the Plan of Care: 4+: HIGH COMPLEXITY   CLINICAL PRESENTATION:   Presentation: Evolving clinical presentation with changing clinical characteristics: MODERATE COMPLEXITY   CLINICAL DECISION MAKING: [facility] Mobility Inpatient Short Form  How much difficulty does the patient currently have. .. Unable A Lot A Little None   1. Turning over in bed (including adjusting bedclothes, sheets and blankets)? [] 1   [] 2   [] 3   [x] 4   2. Sitting down on and standing up from a chair with arms ( e.g., wheelchair, bedside commode, etc.)   [] 1   [] 2   [x] 3   [] 4   3. Moving from lying on back to sitting on the side of the bed? [] 1   [] 2   [] 3   [x] 4   How much help from another person does the patient currently need. .. Total A Lot A Little None   4. Moving to and from a bed to a chair (including a wheelchair)? [] 1   [] 2   [x] 3   [] 4   5. Need to walk in hospital room? [] 1   [] 2   [x] 3   [] 4   6. Climbing 3-5 steps with a railing? [] 1   [x] 2   [] 3   [] 4   © 2007, Trustees of [address] [place] [address] [address] [id number], under license to [employer]. All rights reserved      Score:  Initial: 13 Most Recent: 19 (Date: 5/5/2020)    Interpretation of Tool:  Represents activities that are increasingly more difficult (i.e. Bed mobility, Transfers, Gait). Medical Necessity:     · Patient is expected to demonstrate progress in   · strength, range of motion, balance, coordination, and functional technique  ·  to   · increase independence with all mobility. · .  Reason for Services/Other Comments:  · Patient continues to require skilled intervention due to   · medical complications and mobility deficits which impact his level of function, safety, and independence as indicated above. · .   Use of outcome tool(s) and clinical judgement create a POC that gives a: Questionable prediction of patient's progress: MODERATE COMPLEXITY        TREATMENT:      Pre-treatment Symptoms/Complaints: see above  Pain: Initial: 0/10 Post Session:  0/10     Therapeutic Activity: (    30 Minutes):   Therapeutic activities including bed mobility training, transfer training from various surface heights, ambulation on level ground, static/dynamic standing balance, posture training, instruction in sequencing with luke walker, and patient education to improve mobility, strength and balance. Required moderate Safety awareness training; Tactile cues; Verbal cues to promote static and dynamic balance in standing, promote coordination of bilateral, lower extremity(s) and promote motor control of bilateral, lower extremity(s). Date:  5/12/20 Date:   Date:     Activity/Exercise Parameters Parameters Parameters   Supine hip internal rotation 10 BLE A     Supine quadriceps sets 10 2' hold BLE A     Supine SLR 10 AAROM BLE     bridges 10 AROM                           Braces/Orthotics/Lines/Etc:   · L LE AFO  Treatment/Session Assessment:    · Response to Treatment:  See above  · Interdisciplinary Collaboration:   o Physical Therapy Assistant  o Registered Nurse  o Rehabilitation Attendant  · After treatment position/precautions:   o Bed alarm/tab alert on  o Bed/Chair-wheels locked  o Call light within reach  o RN notified  o Up in wheelchair with ambulation team   · Compliance with Program/Exercises: Compliant all of the time  · Recommendations/Intent for next treatment session: \"Next visit will focus on advancements to more challenging activities and reduction in assistance provided\".     Total Treatment Duration:  PT Patient Time In/Time Out  Time In: 1340  Time Out: [facility]

## 2020-05-15 PROCEDURE — 74011250637 HC RX REV CODE- 250/637: Performed by: HOSPITALIST

## 2020-05-15 PROCEDURE — 74011250637 HC RX REV CODE- 250/637: Performed by: NURSE PRACTITIONER

## 2020-05-15 PROCEDURE — 65270000029 HC RM PRIVATE

## 2020-05-15 PROCEDURE — 74011250637 HC RX REV CODE- 250/637: Performed by: INTERNAL MEDICINE

## 2020-05-15 PROCEDURE — 97530 THERAPEUTIC ACTIVITIES: CPT

## 2020-05-15 PROCEDURE — 74011250636 HC RX REV CODE- 250/636: Performed by: INTERNAL MEDICINE

## 2020-05-15 RX ADMIN — ACETAMINOPHEN 650 MG: 325 TABLET, FILM COATED ORAL at 22:42

## 2020-05-15 RX ADMIN — ATORVASTATIN CALCIUM 80 MG: 80 TABLET, FILM COATED ORAL at 22:42

## 2020-05-15 RX ADMIN — METFORMIN HYDROCHLORIDE 500 MG: 500 TABLET ORAL at 08:15

## 2020-05-15 RX ADMIN — Medication 400 MG: at 17:29

## 2020-05-15 RX ADMIN — ENOXAPARIN SODIUM 40 MG: 40 INJECTION SUBCUTANEOUS at 14:44

## 2020-05-15 RX ADMIN — DIPHENHYDRAMINE HYDROCHLORIDE 25 MG: 25 CAPSULE ORAL at 17:35

## 2020-05-15 RX ADMIN — Medication 400 MG: at 08:16

## 2020-05-15 RX ADMIN — Medication 10 ML: at 14:44

## 2020-05-15 RX ADMIN — METOPROLOL SUCCINATE 25 MG: 25 TABLET, FILM COATED, EXTENDED RELEASE ORAL at 08:16

## 2020-05-15 RX ADMIN — ACETAMINOPHEN 650 MG: 325 TABLET, FILM COATED ORAL at 05:32

## 2020-05-15 RX ADMIN — GUAIFENESIN 100 MG: 100 SOLUTION ORAL at 17:32

## 2020-05-15 RX ADMIN — METFORMIN HYDROCHLORIDE 500 MG: 500 TABLET ORAL at 17:29

## 2020-05-15 RX ADMIN — LISINOPRIL 40 MG: 20 TABLET ORAL at 08:16

## 2020-05-15 RX ADMIN — ASPIRIN 81 MG 81 MG: 81 TABLET ORAL at 08:16

## 2020-05-15 RX ADMIN — ACETAMINOPHEN 650 MG: 325 TABLET, FILM COATED ORAL at 14:44

## 2020-05-15 NOTE — PROGRESS NOTES
Problem: Patient Education: Go to Patient Education Activity  Goal: Patient/Family Education  Outcome: Progressing Towards Goal     Problem: Pressure Injury - Risk of  Goal: *Prevention of pressure injury  Description: Document Dewey Scale and appropriate interventions in the flowsheet.   Outcome: Progressing Towards Goal  Note: Pressure Injury Interventions:  Sensory Interventions: Assess changes in LOC    Moisture Interventions: Absorbent underpads, Apply protective barrier, creams and emollients    Activity Interventions: Pressure redistribution bed/mattress(bed type), PT/OT evaluation    Mobility Interventions: Chair cushion, PT/OT evaluation    Nutrition Interventions: Offer support with meals,snacks and hydration    Friction and Shear Interventions: HOB 30 degrees or less

## 2020-05-15 NOTE — PROGRESS NOTES
Progress Note    5/15/2020  Admit Date: 2019  9:07 AM   NAME: Tammy Montes   :  1975   MRN:  [id number]   Attending: José Manuel Howell MD  PCP:  Jordin Phillips MD  Treatment Team: Attending Provider: José Manuel Howell MD; Care Manager: Lamar Huitron [facility]; Utilization Review: Saba Hardin RN; Nurse Practitioner: Beverly Estes NP; Care Manager: Stone Orourke RN; Hospitalist: Dakotah Langley NP; Utilization Review: Elizabeth Shipley RN; Nurse Practitioner: Evita Peng NP; Charge Nurse: Nicole Acuna; Physical Therapist: Sravan Palencia PT, DPT; Occupational Therapist: Reginald Mra    Full Code   SUBJECTIVE:   Mr. Radha Roldan is a 54 yo male with PMH of DM, HTN who presented with c/o left sided weakness and left facial droop 19.   CT head showed age indeterminate infarctions within the left corona radiata/caudate.  CTA head/neck showed stenosis at the distal segment of the right vertebral artery and multifocal stenosis in the P2 segment of the left posterior cerebral artery. MRI brain showed acute to early subacute infarcts in the left cerebellar hemisphere, in the periventricular deep left frontoparietal white matter and likely additional areas of restricted diffusion in the right paramedian yariel.   Echo +PFO.  On statin, ASA.  Has been difficult to place in STR. Plans for 4 week event monitor on DC and if no arrhythmia PFO closure recommended. Pt remains stable today. Awaiting placement, medicaid pending. Denies complaints. Progressing well with PT.     Past Medical History:   Diagnosis Date    Acute ischemic stroke ([address] 2019    Acute pancreatitis 2014    Cerebrovascular accident (CVA) due to embolism ([address] 2019    Diabetes ([address] 2002    Diabetes [facility]     Diabetes mellitus     Hypertension      No results found for this or any previous visit (from the past 25 hour(s)).   No Known Allergies  Current Facility-Administered Medications   Medication Dose Route Frequency Provider Last Rate Last Dose    magnesium oxide (MAG-OX) tablet 400 mg  400 mg Oral BID Tea Gary, NP   400 mg at 05/15/20 0816    metFORMIN (GLUCOPHAGE) tablet 500 mg  500 mg Oral BID WITH MEALS Pepe Monet, NP   500 mg at 05/15/20 0815    acetaminophen (TYLENOL) tablet 650 mg  650 mg Oral Q4H PRN Jeff Adames MD   650 mg at 05/04/20 8930    diphenhydrAMINE (BENADRYL) capsule 25 mg  25 mg Oral Q6H PRN Amol Castillo MD   25 mg at 05/14/20 1725    acetaminophen (TYLENOL) tablet 650 mg  650 mg Oral Kristy Jones MD   650 mg at 05/15/20 0532    lip protectant (BLISTEX) ointment 1 Each  1 Each Topical PRN John Xiong MD        metoprolol succinate (TOPROL-XL) XL tablet 25 mg  25 mg Oral DAILY Jeff Adames MD   25 mg at 05/15/20 0816    polyethylene glycol (MIRALAX) packet 17 g  17 g Oral DAILY PRN Wilder TARIQ DO   17 g at 05/11/20 0539    guaiFENesin (ROBITUSSIN) 100 mg/5 mL oral liquid 100 mg  100 mg Oral Q4H PRN Jeff Adames MD   100 mg at 05/14/20 1725    hydrALAZINE (APRESOLINE) 20 mg/mL injection 20 mg  20 mg IntraVENous Q4H PRN Radha Singh MD   20 mg at 04/17/20 0510    atorvastatin (LIPITOR) tablet 80 mg  80 mg Oral QHS Radha Singh MD   80 mg at 05/14/20 2213    lisinopril (PRINIVIL, ZESTRIL) tablet 40 mg  40 mg Oral DAILY Radha Singh MD   40 mg at 05/15/20 0816    sodium chloride (NS) flush 5-40 mL  5-40 mL IntraVENous PRN Radha Singh MD   5 mL at 05/11/20 1505    ondansetron (ZOFRAN) injection 4 mg  4 mg IntraVENous Q4H PRN Radha Singh MD        aspirin chewable tablet 81 mg  81 mg Oral DAILY Radha Singh MD   81 mg at 05/15/20 0816    enoxaparin (LOVENOX) injection 40 mg  40 mg SubCUTAneous Q24H Radha Singh MD   40 mg at 05/14/20 1319    dextrose 40% (GLUTOSE) oral gel 1 Tube  15 g Oral PRN Radha Singh MD        glucagon [facility]) injection 1 mg  1 mg IntraMUSCular PRN Paris Huggins MD        dextrose (D50W) injection syrg 12.5-25 g  25-50 mL IntraVENous PRN Paris Huggins MD           Review of Systems negative with exception of pertinent positives noted above  PHYSICAL EXAM     Visit Vitals  /67 (BP 1 Location: Right arm, BP Patient Position: At rest)   Pulse 74   Temp 97.9 °F (36.6 °C)   Resp 18   Ht 5' 6\" (1.676 m)   Wt 79.8 kg (175 lb 14.4 oz)   SpO2 97%   BMI 28.39 kg/m²      Temp (24hrs), Av °F (36.7 °C), Min:97.7 °F (36.5 °C), Max:98.3 °F (36.8 °C)    Oxygen Therapy  O2 Sat (%): 97 % (05/15/20 0723)  Pulse via Oximetry: 75 beats per minute (20 0400)  O2 Device: Room air (20 1648)  No intake or output data in the 24 hours ending 05/15/20 1120     General:       No acute distress    Lungs:          CTA bilaterally. Resp even and nonlabored  Heart:            S1S2 present without murmurs rubs gallops. RRR. No LE edema  Abdomen:    Soft, non tender, non distended. BS present  Extremities: No cyanosis. Moves LUE at elbow  Neurologic:  moves right hand and raises arm at elbow moderately, moves left arm at elbow mildly slurred speech.  PERRLA, strength 4/5 RUE/RLE,  2/5 LUE, LLE 1/5. Results summary of Diagnostic Studies/Procedures copied from within [facility] EMR:    Edmar Dixon 96 Problems    Diagnosis Date Noted    Hypomagnesemia 2020    PFO (patent foramen ovale) 2019    Arrhythmia 12/15/2019    Hyperlipemia     [facility]     Type 2 diabetes mellitus ([facility]     Hypertension      Plan:       Acute embolic stroke  MRI brain showed acute to early subacute infarcts in the left cerebellar hemisphere, in the periventricular deep left frontoparietal white matter and likely additional areas of restricted diffusion in the right paramedian yariel.    +PFO on echo  On ASA, statin  Will need 4 week event monitor on DC, if no arrhythmia then will need PFO closure      Hypomagnesemia   on oral supplementation        Hypertension  Continue metoprolol and ACE inhibitor  Goal BP <130/80     Type 2 diabetes mellitus:    Monitor, BGL controlled   A1C 6.9      Medically stable for DC.  Awaiting Medicaid approval.         Notes, labs, VS, diagnostic testing reviewed  Time spent with pt 20 min           DVT Prophylaxis: lovenox     Plan of Care Discussed with: Supervising MD Dr. Franky Fox, care team, pt        Moises Hughes NP      Pt updates wife daily. Does not need me to call per pt.

## 2020-05-15 NOTE — PROGRESS NOTES
Problem: Mobility Impaired (Adult and Pediatric)  Goal: *Acute Goals and Plan of Care  Description  GOALS REVIEWED ON 5/12/2020 (BOLD INDICATES MOST RECENTLY UPDATED GOAL):  1. Patient will perform bed mobility with MODIFIED INDEPENDENCE and 0 verbal cues within 7 treatment days. 2. Patient will perform transfer bed to chair/wheelchair with MODIFIED INDEPENDENCE within 7 treatment days. 3. Patient will demonstrate FAIR+ dynamic balance throughout ambulation within 7 treatment days. 4. Patient demonstrate 3+/5 strength in L LE hip flexion, hip abduction, and hip adduction within 7 treatment days. 5. Patient will ambulate 300ft+ with STAND BY ASSIST and least retrictive assistive device and L LE AFO within 7 treatment days. 6. Patient will perform wheelchair mobility 75 ft with modified independence within 7 treatment days. 7. Patient will don/doff LUE sling for protection of L shoulder during transfers/gait with set up within 7 treatment days. 8. Patient will instruct caregiver how to don/doff L LE AFO with independence within 7 treatment days. PHYSICAL THERAPY: Daily Note and AM 5/15/2020  INPATIENT: PT Visit Days : 4  [facility]: [id number] / Plan: [address] [place] [address] / Product Type: Medicaid /       NAME/AGE/GENDER: Calin Yen is a 53 y.o. male   PRIMARY DIAGNOSIS: Acute ischemic stroke ([address] [I63.9]  Cerebrovascular accident (CVA) due to embolism ([address] [L58.3] Acute embolic stroke ([address] Acute embolic stroke ([address]       ICD-10: Treatment Diagnosis:   · Difficulty in walking, Not elsewhere classified (R26.2)  · Hemiplegia and hemiparesis following cerebral infarction affecting left non-dominant side (Y08.252)   Precaution/Allergies:  Patient has no known allergies. ASSESSMENT:     Mr. Henriquez is a 53year old admitted R MCA CVA. Patient was supine upon contact and agreeable to PT.  Patient performs supine to sit with supervision, additional time, and cues for improved/proper technique. Donned L LE AFO and tennis shoes with total assistance. Patient seems to have increased difficulty performing sit to stand with LLE AFO donned. Patient needed CGA-min assist for transfers today whereas he was requiring SBA prior. Once standing patient ambulates 300ft then 100ft with hemiwalker and gait belt. Encouraged patient to ambulate further before taking a seated rest break but he declined stating, \"nah, im gonna rest now\". Patient took a seated rest break in between ambulation bouts. Cues for gait mechanics, assistive device sequencing, and dynamic standing balance throughout. Mechanics much improved with L LE AFO, circumduction decreased, knee hyperextension decreased, and improved clearance during swing phase of gait. Overall slow progress towards physical therapy goals. Patient's goals listed above are still appropriate. Will continue skilled PT to address remaining deficits. This section established at most recent assessment   PROBLEM LIST (Impairments causing functional limitations):  1. Decreased Strength  2. Decreased ADL/Functional Activities  3. Decreased Transfer Abilities  4. Decreased Ambulation Ability/Technique  5. Decreased Balance  6. Increased Pain  7. Decreased Activity Tolerance  8. Increased Fatigue  9. Decreased Flexibility/Joint Mobility   INTERVENTIONS PLANNED: (Benefits and precautions of physical therapy have been discussed with the patient.)  1. Balance Exercise  2. Bed Mobility  3. Family Education  4. Gait Training  5. Home Exercise Program (HEP)  6. Manual Therapy  7. Neuromuscular Re-education/Strengthening  8. Range of Motion (ROM)  9. Therapeutic Activites  10. Therapeutic Exercise/Strengthening  11. Transfer Training     TREATMENT PLAN: Frequency/Duration: 5 times a week for duration of hospital stay  Rehabilitation Potential For Stated Goals: Good     REHAB RECOMMENDATIONS (at time of discharge pending progress):    Placement:   It is my opinion, based on this patient's performance to date, that Mr. Joanne Beach may benefit from intensive therapy at an [facility] after discharge due to a probable need for close medical supervision by a rehab physician, a probable need for multiple therapy disciplines and potential to make ongoing and sustainable functional improvement that is of practical value. .  Equipment:    TBD pending progress with therapy. HISTORY:   History of Present Injury/Illness (Reason for Referral):  Per H&P: \"Pt is a 52 y/o smoker with DM, HTN, who presented to ER with L leg and arm weakness, L facial droop, dysarthria. First noted L leg weakness late night 12/11 when he woke up to go to the bathroom. He was normal when he went to bed around 1030. Woke up this morning and had persistent weakness L leg and also now noted in L arm. EMS called. Noted to have slurred speech and L facial droop as well. Code S called in ER around 9am.  MRI with acute infarcts in L cerebellar hemisphere, deep frontoparietal white matter, and R paramedian yariel. Also noted old lacunar infarcts. No large vessel occlusion on CTA, but some stenosis noted. No hx afib, TIA, CVA. No CP, palpitations, SOB. \"  Past Medical History/Comorbidities:   Mr. Joanne Beach  has a past medical history of Acute ischemic stroke ([address] (12/12/2019), Acute pancreatitis (11/19/2014), Cerebrovascular accident (CVA) due to embolism ([address] (12/12/2019), Diabetes ([address] (2002), Diabetes ([address] Diabetes mellitus, and Hypertension. He also has no past medical history of Arthritis, Asthma, Autoimmune disease ([address] CAD (coronary artery disease), Cancer ([address] Chronic kidney disease, COPD, Dementia, Dementia ([address] Heart failure ([address] Ill-defined condition, Infectious disease, Liver disease, Other ill-defined conditions(799.89), Psychiatric disorder, PUD (peptic ulcer disease), Seizures ([address] or Sleep disorder.   Mr. Joanne Beach  has a past surgical history that includes hx hernia repair and hx orthopaedic. Social History/Living Environment:   Home Environment: Apartment  # Steps to Enter: 12  Rails to Enter: Yes  [employer] : Bilateral  One/Two Story Residence: One story  Living Alone: No  Support Systems: Spouse/Significant Other/Partner  Patient Expects to be Discharged to[de-identified] Unknown  Current DME Used/Available at Home: None  Tub or Shower Type: Tub/Shower combination  Prior Level of Function/Work/Activity:  Independent, lives with wife in 2nd story 1 level apartment. No recent falls. Number of Personal Factors/Comorbidities that affect the Plan of Care: 1-2: MODERATE COMPLEXITY   EXAMINATION:   Most Recent Physical Functioning:   Gross Assessment: left hip grossly 2/5 to 3-/5                       Balance:  Sitting: Intact  Standing: Impaired  Standing - Static: Good  Standing - Dynamic : Fair Bed Mobility:  Supine to Sit: Supervision  Wheelchair Mobility: NT     Transfers:  Sit to Stand: Contact guard assistance;Minimum assistance  Stand to Sit: Contact guard assistance;Minimum assistance  Gait:     Base of Support: Center of gravity altered;Narrowed  Speed/Clotilde: Slow  Step Length: Left shortened;Right shortened  Gait Abnormalities: Hemiplegic;Trunk sway increased  Distance (ft): (300' then 100')  Assistive Device: Walker luke;Brace/Splint;Gait belt  Ambulation - Level of Assistance: Contact guard assistance;Stand-by assistance  Interventions: Safety awareness training; Tactile cues; Verbal cues      Body Structures Involved:  1. Nerves  2. Voice/Speech  3. Bones  4. Joints  5. Muscles Body Functions Affected:  1. Mental  2. Sensory/Pain  3. Neuromusculoskeletal  4. Movement Related Activities and Participation Affected:  1. General Tasks and Demands  2. Mobility  3. Self Care  4.  Interpersonal Interactions and Relationships   Number of elements that affect the Plan of Care: 4+: HIGH COMPLEXITY   CLINICAL PRESENTATION:   Presentation: Evolving clinical presentation with changing clinical characteristics: MODERATE COMPLEXITY   CLINICAL DECISION MAKIN[address] [place] [address] AM-PAC 6 Clicks   Basic Mobility Inpatient Short Form  How much difficulty does the patient currently have. .. Unable A Lot A Little None   1. Turning over in bed (including adjusting bedclothes, sheets and blankets)? [] 1   [] 2   [] 3   [x] 4   2. Sitting down on and standing up from a chair with arms ( e.g., wheelchair, bedside commode, etc.)   [] 1   [] 2   [x] 3   [] 4   3. Moving from lying on back to sitting on the side of the bed? [] 1   [] 2   [] 3   [x] 4   How much help from another person does the patient currently need. .. Total A Lot A Little None   4. Moving to and from a bed to a chair (including a wheelchair)? [] 1   [] 2   [x] 3   [] 4   5. Need to walk in hospital room? [] 1   [] 2   [x] 3   [] 4   6. Climbing 3-5 steps with a railing? [] 1   [x] 2   [] 3   [] 4   © , Trustees of [address] [place], [place] [address], under license to [email or web address]. All rights reserved      Score:  Initial: 13 Most Recent: 19 (Date: 2020)    Interpretation of Tool:  Represents activities that are increasingly more difficult (i.e. Bed mobility, Transfers, Gait). Medical Necessity:     · Patient is expected to demonstrate progress in   · strength, range of motion, balance, coordination, and functional technique  ·  to   · increase independence with all mobility. · .  Reason for Services/Other Comments:  · Patient continues to require skilled intervention due to   · medical complications and mobility deficits which impact his level of function, safety, and independence as indicated above. · .   Use of outcome tool(s) and clinical judgement create a POC that gives a: Questionable prediction of patient's progress: MODERATE COMPLEXITY        TREATMENT:      Pre-treatment Symptoms/Complaints: see above  Pain: Initial: 0/10 Post Session:  0/10     Therapeutic Activity: (    30 Minutes):   Therapeutic activities including bed mobility training, transfer training from various surface heights, ambulation on level ground, static/dynamic standing balance, posture training, instruction in sequencing with luke walker, and patient education to improve mobility, strength and balance. Required moderate Safety awareness training; Tactile cues; Verbal cues to promote static and dynamic balance in standing, promote coordination of bilateral, lower extremity(s) and promote motor control of bilateral, lower extremity(s). Date:  5/12/20 Date:   Date:     Activity/Exercise Parameters Parameters Parameters   Supine hip internal rotation 10 BLE A     Supine quadriceps sets 10 2' hold BLE A     Supine SLR 10 AAROM BLE     bridges 10 AROM                           Braces/Orthotics/Lines/Etc:   · L LE AFO  Treatment/Session Assessment:    · Response to Treatment:  See above  · Interdisciplinary Collaboration:   o Physical Therapy Assistant  o Registered Nurse  o Rehabilitation Attendant  · After treatment position/precautions:   o Bed alarm/tab alert on  o Bed/Chair-wheels locked  o Call light within reach  o RN notified  o Up in wheelchair with ambulation team   · Compliance with Program/Exercises: Compliant all of the time  · Recommendations/Intent for next treatment session: \"Next visit will focus on advancements to more challenging activities and reduction in assistance provided\".     Total Treatment Duration:  PT Patient Time In/Time Out  Time In: 1300  Time Out: 1700 [facility]

## 2020-05-16 PROCEDURE — 74011250637 HC RX REV CODE- 250/637: Performed by: HOSPITALIST

## 2020-05-16 PROCEDURE — 74011250637 HC RX REV CODE- 250/637: Performed by: INTERNAL MEDICINE

## 2020-05-16 PROCEDURE — 65270000029 HC RM PRIVATE

## 2020-05-16 PROCEDURE — 74011250636 HC RX REV CODE- 250/636: Performed by: INTERNAL MEDICINE

## 2020-05-16 PROCEDURE — 74011250637 HC RX REV CODE- 250/637: Performed by: NURSE PRACTITIONER

## 2020-05-16 RX ADMIN — LISINOPRIL 40 MG: 20 TABLET ORAL at 08:22

## 2020-05-16 RX ADMIN — Medication 400 MG: at 08:23

## 2020-05-16 RX ADMIN — ATORVASTATIN CALCIUM 80 MG: 80 TABLET, FILM COATED ORAL at 20:30

## 2020-05-16 RX ADMIN — ENOXAPARIN SODIUM 40 MG: 40 INJECTION SUBCUTANEOUS at 13:18

## 2020-05-16 RX ADMIN — ACETAMINOPHEN 650 MG: 325 TABLET, FILM COATED ORAL at 20:30

## 2020-05-16 RX ADMIN — METOPROLOL SUCCINATE 25 MG: 25 TABLET, FILM COATED, EXTENDED RELEASE ORAL at 08:22

## 2020-05-16 RX ADMIN — GUAIFENESIN 100 MG: 100 SOLUTION ORAL at 17:25

## 2020-05-16 RX ADMIN — METFORMIN HYDROCHLORIDE 500 MG: 500 TABLET ORAL at 17:24

## 2020-05-16 RX ADMIN — Medication 400 MG: at 17:24

## 2020-05-16 RX ADMIN — ACETAMINOPHEN 650 MG: 325 TABLET, FILM COATED ORAL at 06:28

## 2020-05-16 RX ADMIN — ASPIRIN 81 MG 81 MG: 81 TABLET ORAL at 08:23

## 2020-05-16 RX ADMIN — METFORMIN HYDROCHLORIDE 500 MG: 500 TABLET ORAL at 08:23

## 2020-05-16 RX ADMIN — ACETAMINOPHEN 650 MG: 325 TABLET, FILM COATED ORAL at 13:18

## 2020-05-16 RX ADMIN — DIPHENHYDRAMINE HYDROCHLORIDE 25 MG: 25 CAPSULE ORAL at 17:25

## 2020-05-16 NOTE — PROGRESS NOTES
Problem: Patient Education: Go to Patient Education Activity  Goal: Patient/Family Education  Outcome: Progressing Towards Goal     Problem: Pressure Injury - Risk of  Goal: *Prevention of pressure injury  Description: Document Dewey Scale and appropriate interventions in the flowsheet.   Outcome: Progressing Towards Goal  Note: Pressure Injury Interventions:  Sensory Interventions: Assess changes in LOC    Moisture Interventions: Absorbent underpads, Apply protective barrier, creams and emollients, Minimize layers, Moisture barrier, Offer toileting Q_hr    Activity Interventions: Pressure redistribution bed/mattress(bed type), Increase time out of bed, PT/OT evaluation    Mobility Interventions: Assess need for specialty bed, Chair cushion, PT/OT evaluation    Nutrition Interventions: Offer support with meals,snacks and hydration    Friction and Shear Interventions: Feet elevated on foot rest, HOB 30 degrees or less

## 2020-05-16 NOTE — PROGRESS NOTES
Hospitalist Progress Note     Admit Date:  2019  9:07 AM   Name:  Cristina Enriquez   Age:  53 y.o.  :  1975   MRN:  [id number]   PCP:  Chari Crook MD  Treatment Team: Attending Provider: Amisha Gee MD; Care Manager: Diaz Burnett LMSW; Utilization Review: Linda Prieto RN; Nurse Practitioner: Ailyn Rubio NP; Care Manager: Ileana Nelson RN; Hospitalist: Franck Jaquez NP; Utilization Review: Laura Unger RN    Subjective:   HPI and or CC:  52 yo AA male with PMH of DM, HTN admitted  for CVA affecting numerous areas of the brain. He was placed on ASA and statin. He has remained alert and oriented x3. Some movement to right side but unable to grasp with right hand. He is currently waiting on placement and this has remained difficult due to waiting on Medicaid. :  Alert and oriented x3. Voices no complaints today. Continues to wait for placement. Continues to wait on SSI and Medicaid approval.  He remains afebrile. Objective:     Patient Vitals for the past 24 hrs:   Temp Pulse Resp BP SpO2   20 0800 98 °F (36.7 °C) 70 17 121/75 97 %   20 0400 97.9 °F (36.6 °C) 75 18 145/89 97 %   20 0000 98.2 °F (36.8 °C) 80 18 126/83 98 %   05/15/20 2000 98.1 °F (36.7 °C) 82 18 124/78 96 %   05/15/20 1548 98.1 °F (36.7 °C) 79 18 122/80 97 %     Oxygen Therapy  O2 Sat (%): 97 % (20 0800)  Pulse via Oximetry: 75 beats per minute (20 0400)  O2 Device: Room air (20 1648)    Intake/Output Summary (Last 24 hours) at 2020 1143  Last data filed at 5/15/2020 2245  Gross per 24 hour   Intake 240 ml   Output    Net 240 ml         REVIEW OF SYSTEMS: Comprehensive ROS performed and negative except as stated in HPI. Physical Examination:  General:       No acute distress    Lungs:          CTA bilaterally. Resp even and nonlabored  Heart:            S1S2 present without murmurs rubs gallops. RRR.  No LE edema  Abdomen:    Soft, non tender, non distended. BS present  Extremities: No cyanosis. Left sided hemiparesis  Neurologic:  moves right hand and raises arm at elbow moderately, unable to squeeze my fingers left hand, mildly slurred speech.  PERRLA, strength 4/5 RUE/RLE. Data Review:  I have reviewed all labs, meds, telemetry events, and studies from the last 24 hours. No results found for this or any previous visit (from the past 24 hour(s)). All Micro Results     None          Current Meds:  Current Facility-Administered Medications   Medication Dose Route Frequency    magnesium oxide (MAG-OX) tablet 400 mg  400 mg Oral BID    metFORMIN (GLUCOPHAGE) tablet 500 mg  500 mg Oral BID WITH MEALS    acetaminophen (TYLENOL) tablet 650 mg  650 mg Oral Q4H PRN    diphenhydrAMINE (BENADRYL) capsule 25 mg  25 mg Oral Q6H PRN    acetaminophen (TYLENOL) tablet 650 mg  650 mg Oral Q8H    lip protectant (BLISTEX) ointment 1 Each  1 Each Topical PRN    metoprolol succinate (TOPROL-XL) XL tablet 25 mg  25 mg Oral DAILY    polyethylene glycol (MIRALAX) packet 17 g  17 g Oral DAILY PRN    guaiFENesin (ROBITUSSIN) 100 mg/5 mL oral liquid 100 mg  100 mg Oral Q4H PRN    hydrALAZINE (APRESOLINE) 20 mg/mL injection 20 mg  20 mg IntraVENous Q4H PRN    atorvastatin (LIPITOR) tablet 80 mg  80 mg Oral QHS    lisinopril (PRINIVIL, ZESTRIL) tablet 40 mg  40 mg Oral DAILY    sodium chloride (NS) flush 5-40 mL  5-40 mL IntraVENous PRN    ondansetron (ZOFRAN) injection 4 mg  4 mg IntraVENous Q4H PRN    aspirin chewable tablet 81 mg  81 mg Oral DAILY    enoxaparin (LOVENOX) injection 40 mg  40 mg SubCUTAneous Q24H    dextrose 40% (GLUTOSE) oral gel 1 Tube  15 g Oral PRN    glucagon (GLUCAGEN) injection 1 mg  1 mg IntraMUSCular PRN    dextrose (D50W) injection syrg 12.5-25 g  25-50 mL IntraVENous PRN       Diet:  DIET NUTRITIONAL SUPPLEMENTS  DIET DIABETIC CONSISTENT CARB    Other Studies (last 24 hours):  No results found.     Assessment and Plan: Hospital Problems as of 5/16/2020 Date Reviewed: 3/3/2017          Codes Class Noted - Resolved POA    Hypomagnesemia ICD-10-CM: E83.42  ICD-9-CM: 275.2  3/7/2020 - Present Unknown        PFO (patent foramen ovale) ICD-10-CM: Q21.1  ICD-9-CM: 745.5  12/17/2019 - Present Yes        Arrhythmia ICD-10-CM: I49.9  ICD-9-CM: 427.9  12/15/2019 - Present Yes        Hyperlipemia ICD-10-CM: E78.5  ICD-9-CM: 272.4  12/15/2019 - Present Yes        * (Principal) Acute embolic stroke ([facility] [address] [id number]: I63.9  ICD-9-CM: 434.11  12/12/2019 - Present Yes        Hypertension (Chronic) ICD-10-CM: I10  ICD-9-CM: 401.9  Unknown - Present Yes        Type 2 diabetes mellitus (HCC) (Chronic) ICD-10-CM: E11.9  ICD-9-CM: 250.00  Unknown - Present Yes              A/P:    Acute embolic stroke  MRI brain showed acute to early subacute infarcts in the left cerebellar hemisphere, in the periventricular deep left frontoparietal white matter and likely additional areas of restricted diffusion in the right paramedian yariel. +PFO on echo  On ASA, statin  Per PT recommendations, orthotist consult for AFO for L LE. Will need shunt closure once discharged-  4/30-I spoke with cardiology, they state PFO closure can wait until patient discharged to a facility.     Hypomagnesemia  Resolved       Hypertension  Continue metoprolol and ACE inhibitor  Goal BP <130/80     Type 2 diabetes mellitus:    Monitor, BGL controlled        Signed:  Becki Pollack NP

## 2020-05-17 PROCEDURE — 65270000029 HC RM PRIVATE

## 2020-05-17 PROCEDURE — 74011250637 HC RX REV CODE- 250/637: Performed by: INTERNAL MEDICINE

## 2020-05-17 PROCEDURE — 74011250637 HC RX REV CODE- 250/637: Performed by: HOSPITALIST

## 2020-05-17 PROCEDURE — 74011250637 HC RX REV CODE- 250/637: Performed by: NURSE PRACTITIONER

## 2020-05-17 PROCEDURE — 74011250636 HC RX REV CODE- 250/636: Performed by: INTERNAL MEDICINE

## 2020-05-17 RX ADMIN — ACETAMINOPHEN 650 MG: 325 TABLET, FILM COATED ORAL at 05:25

## 2020-05-17 RX ADMIN — LISINOPRIL 40 MG: 20 TABLET ORAL at 09:05

## 2020-05-17 RX ADMIN — Medication 10 ML: at 14:53

## 2020-05-17 RX ADMIN — METFORMIN HYDROCHLORIDE 500 MG: 500 TABLET ORAL at 17:41

## 2020-05-17 RX ADMIN — ACETAMINOPHEN 650 MG: 325 TABLET, FILM COATED ORAL at 14:52

## 2020-05-17 RX ADMIN — Medication 400 MG: at 09:05

## 2020-05-17 RX ADMIN — Medication 400 MG: at 17:41

## 2020-05-17 RX ADMIN — DIPHENHYDRAMINE HYDROCHLORIDE 25 MG: 25 CAPSULE ORAL at 17:41

## 2020-05-17 RX ADMIN — ACETAMINOPHEN 650 MG: 325 TABLET, FILM COATED ORAL at 22:35

## 2020-05-17 RX ADMIN — METFORMIN HYDROCHLORIDE 500 MG: 500 TABLET ORAL at 09:04

## 2020-05-17 RX ADMIN — ENOXAPARIN SODIUM 40 MG: 40 INJECTION SUBCUTANEOUS at 14:52

## 2020-05-17 RX ADMIN — GUAIFENESIN 100 MG: 100 SOLUTION ORAL at 17:42

## 2020-05-17 RX ADMIN — ATORVASTATIN CALCIUM 80 MG: 80 TABLET, FILM COATED ORAL at 22:35

## 2020-05-17 RX ADMIN — METOPROLOL SUCCINATE 25 MG: 25 TABLET, FILM COATED, EXTENDED RELEASE ORAL at 09:04

## 2020-05-17 RX ADMIN — ASPIRIN 81 MG 81 MG: 81 TABLET ORAL at 09:04

## 2020-05-17 NOTE — PROGRESS NOTES
Hospitalist Progress Note     Admit Date:  2019  9:07 AM   Name:  Allie Winters   Age:  53 y.o.  :  1975   MRN:  [id number]   PCP:  Etheleen Aase, MD  Treatment Team: Attending Provider: Joy Fleming MD; [place] [facility] [place] Utilization Review: Roxy Lovell RN; Nurse Practitioner: Laureen Romero NP; Care Manager: Charma Lundborg, RN; Hospitalist: Alexander King NP; Utilization Review: Marina Arnold RN; Charge Nurse: Lavell Voss    Subjective:   HPI and or CC:  54 yo AA male with PMH of DM, HTN admitted  for CVA affecting numerous areas of the brain. He was placed on ASA and statin. He has remained alert and oriented x3. Some movement to right side but unable to grasp with right hand. He is currently waiting on placement and this has remained difficult due to waiting on Medicaid. :  Alert and oriented x3. Voices no complaints today. Continues to wait for placement. Continues to wait on SSI and Medicaid approval.  He remains afebrile. Objective:     Patient Vitals for the past 24 hrs:   Temp Pulse Resp BP SpO2   20 0800 97.9 °F (36.6 °C) 79 17 125/82 96 %   20 0400 98.8 °F (37.1 °C) 82 18 128/80 100 %   20 0000 98.6 °F (37 °C) 85 17 131/84 99 %   20 2000 98.3 °F (36.8 °C) 84 17 132/88 98 %   20 1600 98.3 °F (36.8 °C) 79 17 125/79 98 %   20 1208 97.6 °F (36.4 °C) 74 18 132/76 97 %     Oxygen Therapy  O2 Sat (%): 96 % (20 0800)  Pulse via Oximetry: 75 beats per minute (20 0400)  O2 Device: Room air (20 1648)  No intake or output data in the 24 hours ending 20 1037      REVIEW OF SYSTEMS: Comprehensive ROS performed and negative except as stated in HPI. Physical Examination:  General:       No acute distress    Lungs:          CTA bilaterally. Resp even and nonlabored  Heart:            S1S2 present without murmurs rubs gallops. RRR.  No LE edema  Abdomen:    Soft, non tender, non distended. BS present  Extremities: No cyanosis. Left sided hemiparesis  Neurologic:  moves right hand and raises arm at elbow moderately, unable to squeeze my fingers left hand, mildly slurred speech.  PERRLA, strength 4/5 RUE/RLE. Data Review:  I have reviewed all labs, meds, telemetry events, and studies from the last 24 hours. No results found for this or any previous visit (from the past 24 hour(s)). All Micro Results     None          Current Meds:  Current Facility-Administered Medications   Medication Dose Route Frequency    magnesium oxide (MAG-OX) tablet 400 mg  400 mg Oral BID    metFORMIN (GLUCOPHAGE) tablet 500 mg  500 mg Oral BID WITH MEALS    acetaminophen (TYLENOL) tablet 650 mg  650 mg Oral Q4H PRN    diphenhydrAMINE (BENADRYL) capsule 25 mg  25 mg Oral Q6H PRN    acetaminophen (TYLENOL) tablet 650 mg  650 mg Oral Q8H    lip protectant (BLISTEX) ointment 1 Each  1 Each Topical PRN    metoprolol succinate (TOPROL-XL) XL tablet 25 mg  25 mg Oral DAILY    polyethylene glycol (MIRALAX) packet 17 g  17 g Oral DAILY PRN    guaiFENesin (ROBITUSSIN) 100 mg/5 mL oral liquid 100 mg  100 mg Oral Q4H PRN    hydrALAZINE (APRESOLINE) 20 mg/mL injection 20 mg  20 mg IntraVENous Q4H PRN    atorvastatin (LIPITOR) tablet 80 mg  80 mg Oral QHS    lisinopril (PRINIVIL, ZESTRIL) tablet 40 mg  40 mg Oral DAILY    sodium chloride (NS) flush 5-40 mL  5-40 mL IntraVENous PRN    ondansetron (ZOFRAN) injection 4 mg  4 mg IntraVENous Q4H PRN    aspirin chewable tablet 81 mg  81 mg Oral DAILY    enoxaparin (LOVENOX) injection 40 mg  40 mg SubCUTAneous Q24H    dextrose 40% (GLUTOSE) oral gel 1 Tube  15 g Oral PRN    glucagon (GLUCAGEN) injection 1 mg  1 mg IntraMUSCular PRN    dextrose (D50W) injection syrg 12.5-25 g  25-50 mL IntraVENous PRN       Diet:  DIET NUTRITIONAL SUPPLEMENTS  DIET DIABETIC CONSISTENT CARB    Other Studies (last 24 hours):  No results found.     Assessment and Plan: Hospital Problems as of 5/17/2020 Date Reviewed: 3/3/2017          Codes Class Noted - Resolved POA    Hypomagnesemia ICD-10-CM: E83.42  ICD-9-CM: 275.2  3/7/2020 - Present Unknown        PFO (patent foramen ovale) ICD-10-CM: Q21.1  ICD-9-CM: 745.5  12/17/2019 - Present Yes        Arrhythmia ICD-10-CM: I49.9  ICD-9-CM: 427.9  12/15/2019 - Present Yes        Hyperlipemia ICD-10-CM: E78.5  ICD-9-CM: 272.4  12/15/2019 - Present Yes        * (Principal) Acute embolic stroke ([facility] [address] [id number]: I63.9  ICD-9-CM: 434.11  12/12/2019 - Present Yes        Hypertension (Chronic) ICD-10-CM: I10  ICD-9-CM: 401.9  Unknown - Present Yes        Type 2 diabetes mellitus (HCC) (Chronic) ICD-10-CM: E11.9  ICD-9-CM: 250.00  Unknown - Present Yes              A/P:    Acute embolic stroke  MRI brain showed acute to early subacute infarcts in the left cerebellar hemisphere, in the periventricular deep left frontoparietal white matter and likely additional areas of restricted diffusion in the right paramedian yariel. +PFO on echo  On ASA, statin  Per PT recommendations, orthotist consult for AFO for L LE. Will need shunt closure once discharged-  4/30-I spoke with cardiology, they state PFO closure can wait until patient discharged to a facility.     Hypomagnesemia  Resolved       Hypertension  Continue metoprolol and ACE inhibitor  Goal BP <130/80     Type 2 diabetes mellitus:    Monitor, BGL controlled        Signed:  Hunter Monet NP

## 2020-05-17 NOTE — PROGRESS NOTES
Problem: Patient Education: Go to Patient Education Activity  Goal: Patient/Family Education  Outcome: Progressing Towards Goal     Problem: Pressure Injury - Risk of  Goal: *Prevention of pressure injury  Description: Document Dewey Scale and appropriate interventions in the flowsheet.   Outcome: Progressing Towards Goal  Note: Pressure Injury Interventions:  Sensory Interventions: Assess changes in LOC    Moisture Interventions: Absorbent underpads, Apply protective barrier, creams and emollients, Assess need for specialty bed, Offer toileting Q_hr, Moisture barrier    Activity Interventions: Assess need for specialty bed, Pressure redistribution bed/mattress(bed type), PT/OT evaluation    Mobility Interventions: HOB 30 degrees or less, PT/OT evaluation    Nutrition Interventions: Offer support with meals,snacks and hydration    Friction and Shear Interventions: Minimize layers

## 2020-05-17 NOTE — PROGRESS NOTES
Post Fall Documentation      Avelino Bacon witnessed/unwitnessed fall occurred on 05/17/20 (Date) at 200 (Time). The answers to the following questions summarize the fall: In the patient's own words,:  · What were you attempting to do when you fell? Trying to reach his bracelet   that was laying on a floor  · Do you know why you fell? Tried to reach the bracelet   · Do you have any pain/discomfort or any other complaints? NO  · Which part of your body made contact with the floor or other object? Knees     Nurse:  Yvon Espinal Was this an assisted fall? no   Was fall witnessed? No   If witnessed, what part of the body made contact with the floor or other object? NO   Patients mental status after the fall/when found: Alert, oriented X4    Any apparent injury:  No apparent injury   Immediate interventions for injury/suspected injury? No interventions needed   Patient assisted back to bed? Other 3   Name of provider notified and time, any comments? Susan Forrest         Name of family member notified and time: Pt requested not to call his family      Immediate VS and physical assessment documented in flow sheets. Neuro assessment every hour x 4 (for potential head injury or unwitnessed fall) documented in flow sheets.       Omar Bhagat RN

## 2020-05-18 PROCEDURE — 97530 THERAPEUTIC ACTIVITIES: CPT

## 2020-05-18 PROCEDURE — 74011250637 HC RX REV CODE- 250/637: Performed by: NURSE PRACTITIONER

## 2020-05-18 PROCEDURE — 74011250637 HC RX REV CODE- 250/637: Performed by: INTERNAL MEDICINE

## 2020-05-18 PROCEDURE — 97110 THERAPEUTIC EXERCISES: CPT

## 2020-05-18 PROCEDURE — 65270000029 HC RM PRIVATE

## 2020-05-18 PROCEDURE — 74011250637 HC RX REV CODE- 250/637: Performed by: HOSPITALIST

## 2020-05-18 PROCEDURE — 74011250636 HC RX REV CODE- 250/636: Performed by: INTERNAL MEDICINE

## 2020-05-18 RX ADMIN — ASPIRIN 81 MG 81 MG: 81 TABLET ORAL at 08:43

## 2020-05-18 RX ADMIN — ACETAMINOPHEN 650 MG: 325 TABLET, FILM COATED ORAL at 14:43

## 2020-05-18 RX ADMIN — METFORMIN HYDROCHLORIDE 500 MG: 500 TABLET ORAL at 16:43

## 2020-05-18 RX ADMIN — METOPROLOL SUCCINATE 25 MG: 25 TABLET, FILM COATED, EXTENDED RELEASE ORAL at 08:43

## 2020-05-18 RX ADMIN — ACETAMINOPHEN 650 MG: 325 TABLET, FILM COATED ORAL at 22:47

## 2020-05-18 RX ADMIN — Medication 400 MG: at 08:43

## 2020-05-18 RX ADMIN — METFORMIN HYDROCHLORIDE 500 MG: 500 TABLET ORAL at 08:43

## 2020-05-18 RX ADMIN — ATORVASTATIN CALCIUM 80 MG: 80 TABLET, FILM COATED ORAL at 22:47

## 2020-05-18 RX ADMIN — LISINOPRIL 40 MG: 20 TABLET ORAL at 08:43

## 2020-05-18 RX ADMIN — Medication 400 MG: at 18:00

## 2020-05-18 RX ADMIN — ENOXAPARIN SODIUM 40 MG: 40 INJECTION SUBCUTANEOUS at 14:44

## 2020-05-18 RX ADMIN — DIPHENHYDRAMINE HYDROCHLORIDE 25 MG: 25 CAPSULE ORAL at 18:30

## 2020-05-18 RX ADMIN — GUAIFENESIN 100 MG: 100 SOLUTION ORAL at 18:30

## 2020-05-18 RX ADMIN — ACETAMINOPHEN 650 MG: 325 TABLET, FILM COATED ORAL at 05:55

## 2020-05-18 NOTE — PROGRESS NOTES
Problem: Mobility Impaired (Adult and Pediatric)  Goal: *Acute Goals and Plan of Care  Description  GOALS REVIEWED ON 5/12/2020 (BOLD INDICATES MOST RECENTLY UPDATED GOAL):  1. Patient will perform bed mobility with MODIFIED INDEPENDENCE and 0 verbal cues within 7 treatment days. 2. Patient will perform transfer bed to chair/wheelchair with MODIFIED INDEPENDENCE within 7 treatment days. 3. Patient will demonstrate FAIR+ dynamic balance throughout ambulation within 7 treatment days. 4. Patient demonstrate 3+/5 strength in L LE hip flexion, hip abduction, and hip adduction within 7 treatment days. 5. Patient will ambulate 300ft+ with STAND BY ASSIST and least retrictive assistive device and L LE AFO within 7 treatment days. 6. Patient will perform wheelchair mobility 75 ft with modified independence within 7 treatment days. 7. Patient will don/doff LUE sling for protection of L shoulder during transfers/gait with set up within 7 treatment days. 8. Patient will instruct caregiver how to don/doff L LE AFO with independence within 7 treatment days. PHYSICAL THERAPY: Daily Note and AM 5/18/2020  INPATIENT: PT Visit Days : 5  Payor: [id number] / Plan: [place] / Product Type: Medicaid /       NAME/AGE/GENDER: Priti Kelly is a 53 y.o. male   PRIMARY DIAGNOSIS: Acute ischemic stroke ([address] [I63.9]  Cerebrovascular accident (CVA) due to embolism ([address] [H79.8] Acute embolic stroke ([address] Acute embolic stroke ([address]       ICD-10: Treatment Diagnosis:   · Difficulty in walking, Not elsewhere classified (R26.2)  · Hemiplegia and hemiparesis following cerebral infarction affecting left non-dominant side (Z87.535)   Precaution/Allergies:  Patient has no known allergies. ASSESSMENT:     Mr. Henriquez is a 53year old admitted R MCA CVA with left hemiparesis.  Patient seen this PM for physical therapy treatment session: presents up in chair with ambulation team and agreeable to treatment. Addressed multiple sit to stands and two wheelchair to bed transfers with patient requiring minimal assistance today. Addressed L LE strengthening with standing mini squats and SAQ on L LE. Ambulation also addressed with hemiwalker, L LE AFO, and gait belt. Improved mechanics today with use of L LE AFO: improved heel strike and decreased L LE hyper extension. Cues for foot clearance and avoiding ER with improvement. Patient appeared increasing withdrawn today; despite participating well; minimally interactive verbally and seemed upset. Therapist offered encouragement and emotional support. Ambulation team to interact with patient and  notified as well. Steady progress; goals remain ongoing. Need to continue to address functional transfers as patient required increasing assistance with this pm had progressed to modified independent. This section established at most recent assessment   PROBLEM LIST (Impairments causing functional limitations):  1. Decreased Strength  2. Decreased ADL/Functional Activities  3. Decreased Transfer Abilities  4. Decreased Ambulation Ability/Technique  5. Decreased Balance  6. Increased Pain  7. Decreased Activity Tolerance  8. Increased Fatigue  9. Decreased Flexibility/Joint Mobility   INTERVENTIONS PLANNED: (Benefits and precautions of physical therapy have been discussed with the patient.)  1. Balance Exercise  2. Bed Mobility  3. Family Education  4. Gait Training  5. Home Exercise Program (HEP)  6. Manual Therapy  7. Neuromuscular Re-education/Strengthening  8. Range of Motion (ROM)  9. Therapeutic Activites  10. Therapeutic Exercise/Strengthening  11. Transfer Training     TREATMENT PLAN: Frequency/Duration: 5 times a week for duration of hospital stay  Rehabilitation Potential For Stated Goals: Good     REHAB RECOMMENDATIONS (at time of discharge pending progress):    Placement:   It is my opinion, based on this patient's performance to date, that  Karlos Alvarenga may benefit from intensive therapy at an [address] [place] [address] after discharge due to a probable need for close medical supervision by a rehab physician, a probable need for multiple therapy disciplines and potential to make ongoing and sustainable functional improvement that is of practical value. .  Equipment:    TBD pending progress with therapy. HISTORY:   History of Present Injury/Illness (Reason for Referral):  Per H&P: \"Pt is a 54 y/o smoker with DM, HTN, who presented to ER with L leg and arm weakness, L facial droop, dysarthria. First noted L leg weakness late night 12/11 when he woke up to go to the bathroom. He was normal when he went to bed around 1030. Woke up this morning and had persistent weakness L leg and also now noted in L arm. EMS called. Noted to have slurred speech and L facial droop as well. Code S called in ER around 9am.  MRI with acute infarcts in L cerebellar hemisphere, deep frontoparietal white matter, and R paramedian yariel. Also noted old lacunar infarcts. No large vessel occlusion on CTA, but some stenosis noted. No hx afib, TIA, CVA. No CP, palpitations, SOB. \"  Past Medical History/Comorbidities:   Mr. Karlos Alvarenga  has a past medical history of Acute ischemic stroke ([address] (12/12/2019), Acute pancreatitis (11/19/2014), Cerebrovascular accident (CVA) due to embolism ([address] (12/12/2019), Diabetes ([address] (2002), Diabetes ([address] Diabetes mellitus, and Hypertension. He also has no past medical history of Arthritis, Asthma, Autoimmune disease ([address] CAD (coronary artery disease), Cancer ([address] Chronic kidney disease, COPD, Dementia, Dementia ([address] Heart failure ([address] Ill-defined condition, Infectious disease, Liver disease, Other ill-defined conditions(799.89), Psychiatric disorder, PUD (peptic ulcer disease), Seizures ([address] or Sleep disorder. Mr. Karlos Alvarenga  has a past surgical history that includes hx hernia repair and hx orthopaedic.   Social History/Living Environment:   Home Environment: Apartment  # Steps to Enter: 12  Rails to Enter: Yes  Hand Rails : Bilateral  One/Two Story Residence: One story  Living Alone: No  Support Systems: Spouse/Significant Other/Partner  Patient Expects to be Discharged to[de-identified] Unknown  Current DME Used/Available at Home: None  Tub or Shower Type: Tub/Shower combination  Prior Level of Function/Work/Activity:  Independent, lives with wife in 2nd story 1 level apartment. No recent falls. Number of Personal Factors/Comorbidities that affect the Plan of Care: 1-2: MODERATE COMPLEXITY   EXAMINATION:   Most Recent Physical Functioning:   Gross Assessment: left hip grossly 2/5 to 3-/5  AROM: Generally decreased, functional(L UE/LE)  PROM: Within functional limits  Strength: Generally decreased, functional(L UE/LE)  Coordination: Generally decreased, functional(L UE/LE)  Tone: Abnormal(L UE; L LE)  Sensation: Intact(light touch B UE/LE)                    Balance:  Sitting: Impaired  Sitting - Static: Good (unsupported)  Sitting - Dynamic: Fair (occasional)  Standing: Impaired  Standing - Static: Good  Standing - Dynamic : Fair Bed Mobility:  Scooting: Supervision  Wheelchair Mobility: NT     Transfers:  Sit to Stand: Contact guard assistance;Minimum assistance  Stand to Sit: Contact guard assistance;Minimum assistance  Gait:     Base of Support: Center of gravity altered  Speed/Clotilde: Slow  Gait Abnormalities: Hemiplegic  Distance (ft): (300)  Assistive Device: Walker luke;Gait belt(L LE AFO)  Ambulation - Level of Assistance: Contact guard assistance  Interventions: Safety awareness training; Tactile cues; Verbal cues      Body Structures Involved:  1. Nerves  2. Voice/Speech  3. Bones  4. Joints  5. Muscles Body Functions Affected:  1. Mental  2. Sensory/Pain  3. Neuromusculoskeletal  4. Movement Related Activities and Participation Affected:  1. General Tasks and Demands  2. Mobility  3. Self Care  4.  Interpersonal Interactions and Relationships   Number of elements that affect the Plan of Care: 4+: HIGH COMPLEXITY   CLINICAL PRESENTATION:   Presentation: Evolving clinical presentation with changing clinical characteristics: MODERATE COMPLEXITY   CLINICAL DECISION MAKIN [facility] Inpatient Short Form  How much difficulty does the patient currently have. .. Unable A Lot A Little None   1. Turning over in bed (including adjusting bedclothes, sheets and blankets)? [] 1   [] 2   [] 3   [x] 4   2. Sitting down on and standing up from a chair with arms ( e.g., wheelchair, bedside commode, etc.)   [] 1   [] 2   [x] 3   [] 4   3. Moving from lying on back to sitting on the side of the bed? [] 1   [] 2   [] 3   [x] 4   How much help from another person does the patient currently need. .. Total A Lot A Little None   4. Moving to and from a bed to a chair (including a wheelchair)? [] 1   [] 2   [x] 3   [] 4   5. Need to walk in hospital room? [] 1   [] 2   [x] 3   [] 4   6. Climbing 3-5 steps with a railing? [] 1   [x] 2   [] 3   [] 4   © , Trustees of [facility] [employer], under license to [employer]. All rights reserved      Score:  Initial: 13 Most Recent: 19 (Date: 2020)    Interpretation of Tool:  Represents activities that are increasingly more difficult (i.e. Bed mobility, Transfers, Gait). Medical Necessity:     · Patient is expected to demonstrate progress in   · strength, range of motion, balance, coordination, and functional technique  ·  to   · increase independence with all mobility. · .  Reason for Services/Other Comments:  · Patient continues to require skilled intervention due to   · medical complications and mobility deficits which impact his level of function, safety, and independence as indicated above.    · .   Use of outcome tool(s) and clinical judgement create a POC that gives a: Questionable prediction of patient's progress: MODERATE COMPLEXITY        TREATMENT:      Pre-treatment Symptoms/Complaints: see above  Pain: Initial: 0/10 Post Session:  0/10     Therapeutic Activity: (    32 Minutes): Therapeutic activities including bed mobility training, transfer training from various surface heights, ambulation on level ground, static/dynamic standing balance, posture training, instruction in sequencing with luke walker and gait mechanics, mini squats, SAQs x12 reps L LE, and patient education to improve mobility, strength and balance. Required moderate Safety awareness training; Tactile cues; Verbal cues to promote static and dynamic balance in standing, promote coordination of bilateral, lower extremity(s) and promote motor control of bilateral, lower extremity(s). Braces/Orthotics/Lines/Etc:   · L LE AFO  Treatment/Session Assessment:    · Response to Treatment:  See above  · Interdisciplinary Collaboration:   o Physical Therapist  o Registered Nurse  o Rehabilitation Attendant  · After treatment position/precautions:   o Up in [address] [place] [address] with ambulation team; needs met; patient in NAD   · Compliance with Program/Exercises: Compliant all of the time  · Recommendations/Intent for next treatment session: \"Next visit will focus on advancements to more challenging activities and reduction in assistance provided\".     Total Treatment Duration:  PT Patient Time In/Time Out  Time In: 1332  Time Out: [address], [place]

## 2020-05-18 NOTE — PROGRESS NOTES
's follow-up visit requested by staff. Mr. Aristeo Rascon has been trying to process recent changes at home. I offered spiritual interventions to assist him with coping. He appears to be grieving.  follow-up is planned.      Dontrell Keita 66  Board Certified

## 2020-05-18 NOTE — PROGRESS NOTES
Problem: Self Care Deficits Care Plan (Adult)  Goal: *Acute Goals and Plan of Care (Insert Text)  Description  GOALS REVIEWED and UPDATED ON 5/12/2020 (BOLD INDICATES MOST RECENTLY UPDATED GOAL):  1. Patient will demonstrate appropriate safety awareness and protection of L UE during bed mobility and functional transfers with no verbal cues. CONTINUE   2. Patient will complete lower body bathing and dressing with Min A and adaptive equipment as needed. CONTINUE  3. Patient will complete upper body bathing and dressing with Supervision and adaptive equipment as needed. 4. Patient will complete weightbearing into the L UE with ADL tasks with minimal assistance to improve ability to use as a functional assist during ADL tasks. CONTINUE  5. Patient will demonstrate L UE SROM HEP within 7 days. CONTINUE  6. Pt will complete bed mobility with Mod I and no verbal cueing. CONTINUE  7. Pt will complete functional transfers with Supervision with equipment as needed. CONTINUE  8. Pt will don/doff UE sling with Mod I and no verbal cueing. CONTINUE  9. Pt will complete toileting with Supervision for toilet transfer and gown management. 10. Patient will participate in using L UE as a functional assist for ADL for 15 minutes with minimal facilitation. CONTINUE  11. Patient will complete sit to stand at midline with Supervision and cueing. 12. Patient will complete therapeutic exercises with L UE with minimal tactile cues for facilitation of the LUE. CONTINUE  13. Patient will don L LE AFO with SBA and minimal verbal cueing.     Outcome: Progressing Towards Goal     OCCUPATIONAL THERAPY: Daily Note and PM    5/18/2020  INPATIENT: OT Visit Days: 3  Payor: [id number] / Plan: [place] / Product Type: Medicaid /      NAME/AGE/GENDER: Chel Wood is a 53 y.o. male   PRIMARY DIAGNOSIS:  Acute ischemic stroke ([facility] [I63.9]  Cerebrovascular accident (CVA) due to embolism ([facility] [I63.9] Acute embolic stroke ([facility] Acute embolic stroke ([facility]    TTH-28: Treatment Diagnosis:    · Generalized Muscle Weakness (M62.81)  · Other lack of cordination (R27.8)  · Hemiplegia and hemiparesis following cerebral infarction affecting   · left non-dominant side (I69.354)  · Abnormal posture (R29.3)   Precautions/Allergies:    NO PULLING ON LUE   LUE in sling with shoulder joint approximated and supported, needs taping   Patient has no known allergies. ASSESSMENT:     Mr. Brianna Mcgarry presents to the hospital with L sided weakness and acute ischemic CVA. MRI revealed acute to subacute L cerebellar and R paramedian yariel infarcts. 5/18/2020: Pt found supine in bed and agreeable to OT treatment session. Pt worked on gravity eliminated therapeutic exercises in supine position to increase ROM and return of functional use in L UE. L UE exercises and repetitions noted below in treatment session. Pt observed to have increased elbow flexion/extension, shoulder internal/external rotation, wrist radial and ulnar deviation, wrist flexion/extension, digit ROM, and object grasp/release. Will continue POC. This section established at most recent assessment   PROBLEM LIST (Impairments causing functional limitations):  1. Decreased Strength  2. Decreased ADL/Functional Activities  3. Decreased Transfer Abilities  4. Decreased Ambulation Ability/Technique  5. Decreased Balance  6. Decreased Activity Tolerance  7. Increased Fatigue  8. Decreased Flexibility/Joint Mobility  9. Decreased Knowledge of Precautions  10. Decreased [place] with Home Exercise Program   INTERVENTIONS PLANNED: (Benefits and precautions of occupational therapy have been discussed with the patient.)  1. Activities of daily living training  2. Adaptive equipment training  3. Balance training  4. Clothing management  5. Cognitive training  6. Donning&doffing training  7. Keanu tech training  8. Neuromuscular re-eduation  9.  Therapeutic activity  10. Therapeutic exercise     TREATMENT PLAN: Frequency/Duration: Follow patient 3 times per week to address above goals. Rehabilitation Potential For Stated Goals: Excellent     REHAB RECOMMENDATIONS (at time of discharge pending progress):    Placement: It is my opinion, based on this patient's performance to date, that Mr. Kyung Cisse may benefit from intensive therapy at an [address] [place] [address] after discharge due to potential to make ongoing and sustainable functional improvement that is of practical value. Vira Frizzle Pt functioning far below independent baseline, demonstrating good improvement and participation. Pt would likely benefit greatly and increase independence from inpatient rehab stay. Equipment:    TBD               OCCUPATIONAL PROFILE AND HISTORY:   History of Present Injury/Illness (Reason for Referral):  See H&P  Past Medical History/Comorbidities:   Mr. Kyung Cisse  has a past medical history of Acute ischemic stroke ([address] (12/12/2019), Acute pancreatitis (11/19/2014), Cerebrovascular accident (CVA) due to embolism ([address] (12/12/2019), Diabetes ([address] (2002), Diabetes ([address] Diabetes mellitus, and Hypertension. He also has no past medical history of Arthritis, Asthma, Autoimmune disease ([address] CAD (coronary artery disease), Cancer ([address] Chronic kidney disease, COPD, Dementia, Dementia ([address] Heart failure ([address] Ill-defined condition, Infectious disease, Liver disease, Other ill-defined conditions(799.89), Psychiatric disorder, PUD (peptic ulcer disease), Seizures ([address] or Sleep disorder. Mr. Kyung Cisse  has a past surgical history that includes hx hernia repair and hx orthopaedic.   Social History/Living Environment:   Home Environment: Apartment  # Steps to Enter: 12  Rails to Enter: Yes  [employer] : Bilateral  One/Two Story Residence: One story  Living Alone: No  Support Systems: Spouse/Significant Other/Partner  Patient Expects to be Discharged to[de-identified] Unknown  Current DME Used/Available at Home: None  Tub or Shower Type: Tub/Shower combination  Prior Level of Function/Work/Activity:  Pt lives at home with his wife. Pt is typically independent with ADL/functional mobility. Pt does not drive. Pt was working part-time at [employer]. Personal Factors:          Age:  53 y.o. Past/Current Experience:  CVA with flaccid L side        Other factors that influence how disability is experienced by the patient:  multiple co-morbidities    Number of Personal Factors/Comorbidities that affect the Plan of Care: Extensive review of physical, cognitive, and psychosocial performance (3+):  HIGH COMPLEXITY   ASSESSMENT OF OCCUPATIONAL PERFORMANCE[de-identified]   Activities of Daily Living:   Basic ADLs (From Assessment) Complex ADLs (From Assessment)   Feeding: Setup  Oral Facial Hygiene/Grooming: Minimum assistance  Bathing: Minimum assistance, Moderate assistance  Upper Body Dressing: Minimum assistance  Lower Body Dressing: Moderate assistance  Toileting: Minimum assistance Instrumental ADL  Meal Preparation: Total assistance  Homemaking: Total assistance  Medication Management: Total assistance  Financial Management: Total assistance   Grooming/Bathing/Dressing Activities of Daily Living         Upper Body Bathing  Bathing Assistance: Min A   Position Performed: Seated in chair                  Lower Body Dressing Assistance  Socks:  Total assistance (dependent) Bed/Mat Mobility  Sit to Stand: Contact guard assistance;Minimum assistance  Stand to Sit: Contact guard assistance;Minimum assistance  Scooting: Supervision     Most Recent Physical Functioning:   Gross Assessment:  AROM: Grossly decreased, non-functional(L UE)  Strength: Grossly decreased, non-functional(L UE)  Coordination: Grossly decreased, non-functional(L UE)               Posture:     Balance:  Sitting: Impaired  Sitting - Static: Good (unsupported)  Sitting - Dynamic: Fair (occasional)  Standing: Impaired  Standing - Static: Good  Standing - Dynamic : Fair Bed Mobility:  Scooting: Supervision  Wheelchair Mobility:     Transfers:  Sit to Stand: Contact guard assistance;Minimum assistance  Stand to Sit: Contact guard assistance;Minimum assistance            Patient Vitals for the past 6 hrs:   BP BP Patient Position SpO2 Pulse   20 1200 135/84 At rest 97 % 83       Mental Status  Neurologic State: Alert  Orientation Level: Oriented X4  Cognition: Follows commands  Perception: Appears intact  Perseveration: No perseveration noted  Safety/Judgement: Awareness of environment                          Physical Skills Involved:  1. Range of Motion  2. Balance  3. Strength  4. Activity Tolerance  5. Fine Motor Control  6. Gross Motor Control Cognitive Skills Affected (resulting in the inability to perform in a timely and safe manner):  1. Perception  2. Expression Psychosocial Skills Affected:  1. Habits/Routines  2. Environmental Adaptation  3. Social Interaction  4. Emotional Regulation  5. Self-Awareness  6. Awareness of Others  7. Social Roles   Number of elements that affect the Plan of Care: 5+:  HIGH COMPLEXITY   CLINICAL DECISION MAKIN[address] [place] [id number] AM-PAC 6 Clicks   Daily Activity Inpatient Short Form  How much help from another person does the patient currently need. .. Total A Lot A Little None   1. Putting on and taking off regular lower body clothing? [] 1   [] 2   [x] 3   [] 4   2. Bathing (including washing, rinsing, drying)? [] 1   [] 2   [x] 3   [] 4   3. Toileting, which includes using toilet, bedpan or urinal?   [] 1   [] 2   [x] 3   [] 4   4. Putting on and taking off regular upper body clothing? [] 1   [] 2   [x] 3   [] 4   5. Taking care of personal grooming such as brushing teeth? [] 1   [] 2   [x] 3   [] 4   6. Eating meals? [] 1   [] 2   [x] 3   [] 4   © , Trustees of [address] [place] [address] [address] [id number], under license to [employer].  All rights reserved      Score:  Initial: 11 Most Recent: 18 (5/12/20)    Interpretation of Tool:  Represents activities that are increasingly more difficult (i.e. Bed mobility, Transfers, Gait). Medical Necessity:     · Patient demonstrates   · good and excellent  ·  rehab potential due to higher previous functional level. Reason for Services/Other Comments:  · Patient continues to require skilled intervention due to   · Decreased independence with ADL/functional transfers that impacts overall quality of life. · .   Use of outcome tool(s) and clinical judgement create a POC that gives a: MODERATE COMPLEXITY         TREATMENT:   (In addition to Assessment/Re-Assessment sessions the following treatments were rendered)     Pre-treatment Symptoms/Complaints: Pt without complaints and reports increased interest in performance of LUE exercises. Pain: Initial:   Pain Intensity 1: 0 Post Session: Unchanged     Therapeutic Exercise: (24 minutes):  Exercises per grid below to improve mobility, strength and coordination. Required moderate visual, verbal, manual and tactile cues to promote proper body alignment and promote proper body mechanics. Progressed range and repetitions as indicated. GE (Gravity Eliminated) Date:  5/18/2020 Date:   Date:     Activity/Exercise Parameters Parameters Parameters   GE Shoulder Flexion 12 reps     GE Elbow Flexion  12 reps     GE Elbow Extension  12 reps     GE Wrist Radial and Ulnar Deviation  12 reps each way     GE Shoulder Internal/External Rotation 12 reps each way     GE Wrist Flexion/Extension  12 reps each way     GE Object Grasp/Release (Red Sponge) 12 reps           Date:   4/22/2020 Date:  4/24/2020 Date:  4/27/2020 Date:   5/7/20   Activity/Exercise Parameters Parameters Parameters    Shoulder flexion SROM/AAROM       Elbow flexion/extension SROM/AAROM       Forearm supination/pronation        Wrist extension     20 reps AROM (partial ROM);  Attempted isometric extension but patient not able to hold position in extension Finger flexion/extension     AAROM for tendon gliding (reports pain with flexion); 15-20 reps    Forward Reaching with soccer ball 20 reps (using two handed technique with R hand over left; therapist supporting L elbow) 20 reps (using two handed technique with R hand over left; therapist supporting L elbow) 25 reps (using two handed technique with R hand over left; therapist supporting L elbow) 20 reps using two handed technique and pushing forward for protraction/forward reaching (using washcloth)   Crossing Midline with soccer ball    20 reps each way (using two handed technique with R hand over left; therapist supporting L elbow) Pt completed with for 20 reps with support at the elbow and maximal facilitation from therapist to moving UE (using washcloth)   Shoulder Horizontal Abduction & Adduction with soccer ball 20 reps each way (using two handed technique with R hand over left; therapist supporting L elbow) 20 reps each way (using two handed technique with R hand over left; therapist supporting L elbow)     Shoulder Flexion & Crossing Midline with cones 14 reps (pt stabilized L wrist; therapist supporting L elbow) 14 reps (pt stabilized L wrist; therapist supporting L elbow) 14 reps (pt stabilized L wrist; therapist supporting L elbow) 5 reps with maximal assistance to support at the elbow and wrist   Digit Flexion & Extension with pegs 24 reps (using two handed technique with assist needed for object release) 24 reps (using two handed technique with decreased assist needed for object release) 24 reps (using two handed technique with decreased assist needed for object release)    1st CMC Flexion/Extension  10 reps (using yellow putty with Total A from therapist to complete) 15 reps (using yellow putty with Total A from therapist to complete)                        Date:  4/14/20  Date:  4/15/20 Date:   4/16/20 Date:  5/7/20   Activity/Exercise Parameters Parameters     Midline orientation sit to stand Moderate facilitation at the L LE and for decreased rotation at the trunk      Midline sit to squat  Min to mod A w/ hands in his lap       Weightbearing into L side standing at sink        Forward reach with cane 10 reps with moderate facilitation at the elbow/scapula 15 reps with moderate facilitation at the elbow/scapula into protraction  15 reps with moderate facilitation at the elbow/scapula into protraction 25 reps with moderate facilitation at the elbow/scapula  (cane on the floor and pt pushing forward)   External rotation with cane 10 reps with minimal facilitation  15 reps with SBA/CGA  15 reps with SBA/CGA 25 reps with minimal facilitation (cane on the floor and elbow at his side)   Posture  Upright sitting/standing with verbal/visual cueing  Pt encouraged for upright posture with moderate cues throughout session  Pt encouraged for upright posture with moderate cues throughout session     Weightbearing into elbow  10 reps with moderate assistance pushing up into midline  2 sets with prolonged weight bearing and reaching to the L of midline for 2 sets x 15 reps  Sitting at the table with pt encouraged for propped elbows and weightbearing through the L with moderate cues     Weightbearing into hand Mod to maximal assistance with facilitation at the elbow; 2 sets x 10 reps       Elbow flexion 10 reps AAROM; 10 reps holding ball in B hands       Grasp release of washcloth  10 reps with moderate to maximal facilitation for reaching   4-5 reps with additional time     Trunk Rotation   15 reps with minima facilitation  15 reps with minimal facilitation and additional time            Side Scooting   Minimal facilitation and assistance for positioning and weightbearing of L Hand through his L knee and forward forward flexion at the trunk        Braces/Orthotics/Lines/Etc:   · O2 device: Room air  Treatment/Session Assessment:    Response to Treatment:  Pt observed to have increased ROM in gravity eliminated position. · Interdisciplinary Collaboration:   o Occupational Therapist  o Registered Nurse  · After treatment position/precautions:   o Supine in bed  o Bed alarm/tab alert on  o Bed/Chair-wheels locked  o Bed in low position  o Call light within reach  o RN notified   · Compliance with Program/Exercises: Compliant all of the time, Will assess as treatment progresses. · Recommendations/Intent for next treatment session: \"Next visit will focus on advancements to more challenging activities and reduction in assistance provided\".   Total Treatment Duration:   OT Patient Time In/Time Out  Time In: 1523  Time Out: 1547     Mary Fairchild OTR/L

## 2020-05-18 NOTE — PROGRESS NOTES
Problem: Patient Education: Go to Patient Education Activity  Goal: Patient/Family Education  Outcome: Progressing Towards Goal     Problem: Pressure Injury - Risk of  Goal: *Prevention of pressure injury  Description: Document Dewey Scale and appropriate interventions in the flowsheet. Outcome: Progressing Towards Goal  Note: Pressure Injury Interventions:  Sensory Interventions: Assess changes in LOC    Moisture Interventions: Absorbent underpads, Check for incontinence Q2 hours and as needed, Limit adult briefs, Minimize layers    Activity Interventions: Increase time out of bed, Pressure redistribution bed/mattress(bed type), PT/OT evaluation    Mobility Interventions: HOB 30 degrees or less, Pressure redistribution bed/mattress (bed type), PT/OT evaluation    Nutrition Interventions: Document food/fluid/supplement intake    Friction and Shear Interventions: Minimize layers                Problem: Patient Education: Go to Patient Education Activity  Goal: Patient/Family Education  Outcome: Progressing Towards Goal     Problem: Falls - Risk of  Goal: *Absence of Falls  Description: Document Jeanne Fall Risk and appropriate interventions in the flowsheet.   Outcome: Progressing Towards Goal  Note: Fall Risk Interventions:  Mobility Interventions: Bed/chair exit alarm, Communicate number of staff needed for ambulation/transfer, OT consult for ADLs, Patient to call before getting OOB, PT Consult for mobility concerns    Mentation Interventions: Adequate sleep, hydration, pain control, Bed/chair exit alarm    Medication Interventions: Bed/chair exit alarm, Patient to call before getting OOB, Teach patient to arise slowly    Elimination Interventions: Bed/chair exit alarm, Call light in reach, Patient to call for help with toileting needs, Stay With Me (per policy), Toilet paper/wipes in reach, Toileting schedule/hourly rounds    History of Falls Interventions: Bed/chair exit alarm, Door open when patient unattended, Investigate reason for fall         Problem: Patient Education: Go to Patient Education Activity  Goal: Patient/Family Education  Outcome: Progressing Towards Goal     Problem: Diabetes Self-Management  Goal: *Disease process and treatment process  Description: Define diabetes and identify own type of diabetes; list 3 options for treating diabetes. Outcome: Progressing Towards Goal  Goal: *Incorporating nutritional management into lifestyle  Description: Describe effect of type, amount and timing of food on blood glucose; list 3 methods for planning meals. Outcome: Progressing Towards Goal  Goal: *Incorporating physical activity into lifestyle  Description: State effect of exercise on blood glucose levels. Outcome: Progressing Towards Goal  Goal: *Developing strategies to promote health/change behavior  Description: Define the ABC's of diabetes; identify appropriate screenings, schedule and personal plan for screenings. Outcome: Progressing Towards Goal  Goal: *Using medications safely  Description: State effect of diabetes medications on diabetes; name diabetes medication taking, action and side effects. Outcome: Progressing Towards Goal  Goal: *Monitoring blood glucose, interpreting and using results  Description: Identify recommended blood glucose targets  and personal targets. Outcome: Progressing Towards Goal  Goal: *Prevention, detection, treatment of acute complications  Description: List symptoms of hyper- and hypoglycemia; describe how to treat low blood sugar and actions for lowering  high blood glucose level. Outcome: Progressing Towards Goal  Goal: *Prevention, detection and treatment of chronic complications  Description: Define the natural course of diabetes and describe the relationship of blood glucose levels to long term complications of diabetes.   Outcome: Progressing Towards Goal  Goal: *Developing strategies to address psychosocial issues  Description: Describe feelings about living with diabetes; identify support needed and support network  Outcome: Progressing Towards Goal     Problem: Patient Education: Go to Patient Education Activity  Goal: Patient/Family Education  Outcome: Progressing Towards Goal     Problem: Patient Education: Go to Patient Education Activity  Goal: Patient/Family Education  Outcome: Progressing Towards Goal     Problem: Nutrition Deficit  Goal: *Optimize nutritional status  Outcome: Progressing Towards Goal     Problem: Patient Education: Go to Patient Education Activity  Goal: Patient/Family Education  Outcome: Progressing Towards Goal     Problem: General Medical Care Plan  Goal: *Vital signs within specified parameters  Outcome: Progressing Towards Goal  Goal: *Labs within defined limits  Outcome: Progressing Towards Goal  Goal: *Absence of infection signs and symptoms  Description: Wash hand more often   Outcome: Progressing Towards Goal  Goal: *Optimal pain control at patient's stated goal  Outcome: Progressing Towards Goal  Goal: *Skin integrity maintained  Outcome: Progressing Towards Goal  Goal: *Fluid volume balance  Outcome: Progressing Towards Goal  Goal: *Optimize nutritional status  Outcome: Progressing Towards Goal  Goal: *Anxiety reduced or absent  Outcome: Progressing Towards Goal  Goal: *Progressive mobility and function (eg: ADL's)  Outcome: Progressing Towards Goal     Problem: Patient Education: Go to Patient Education Activity  Goal: Patient/Family Education  Outcome: Progressing Towards Goal     Problem: Patient Education: Go to Patient Education Activity  Goal: Patient/Family Education  Outcome: Progressing Towards Goal     Problem: Patient Education: Go to Patient Education Activity  Goal: Patient/Family Education  Outcome: Progressing Towards Goal     Problem: Patient Education: Go to Patient Education Activity  Goal: Patient/Family Education  Outcome: Progressing Towards Goal     Problem: Ischemic Stroke: Discharge Outcomes  Goal: *Verbalizes anxiety and depression are reduced or absent  Outcome: Progressing Towards Goal  Goal: *Verbalize understanding of risk factor modification(Stroke Metric)  Outcome: Progressing Towards Goal  Goal: *Hemodynamically stable  Outcome: Progressing Towards Goal  Goal: *Absence of aspiration pneumonia  Outcome: Progressing Towards Goal  Goal: *Aware of needed dietary changes  Outcome: Progressing Towards Goal  Goal: *Verbalize understanding of prescribed medications including anti-coagulants, anti-lipid, and/or anti-platelets(Stroke Metric)  Outcome: Progressing Towards Goal  Goal: *Tolerating diet  Outcome: Progressing Towards Goal  Goal: *Aware of follow-up diagnostics related to anticoagulants  Outcome: Progressing Towards Goal  Goal: *Ability to perform ADLs and demonstrates progressive mobility and function  Outcome: Progressing Towards Goal  Goal: *Absence of DVT(Stroke Metric)  Outcome: Progressing Towards Goal  Goal: *Absence of aspiration  Outcome: Progressing Towards Goal  Goal: *Optimal pain control at patient's stated goal  Outcome: Progressing Towards Goal  Goal: *Home safety concerns addressed  Outcome: Progressing Towards Goal  Goal: *Describes available resources and support systems  Outcome: Progressing Towards Goal  Goal: *Verbalizes understanding of activation of EMS(911) for stroke symptoms(Stroke Metric)  Outcome: Progressing Towards Goal  Goal: *Understands and describes signs and symptoms to report to providers(Stroke Metric)  Outcome: Progressing Towards Goal  Goal: *Neurolgocially stable (absence of additional neurological deficits)  Outcome: Progressing Towards Goal  Goal: *Verbalizes importance of follow-up with primary care physician(Stroke Metric)  Outcome: Progressing Towards Goal  Goal: *Smoking cessation discussed,if applicable(Stroke Metric)  Outcome: Progressing Towards Goal  Goal: *Depression screening completed(Stroke Metric)  Outcome: Progressing Towards Goal     Problem: Pain  Goal: *Control of Pain  Outcome: Progressing Towards Goal     Problem: Patient Education: Go to Patient Education Activity  Goal: Patient/Family Education  Outcome: Progressing Towards Goal     Problem: Patient Education: Go to Patient Education Activity  Goal: Patient/Family Education  Outcome: Progressing Towards Goal

## 2020-05-18 NOTE — PROGRESS NOTES
Progress Note    2020  Admit Date: 2019  9:07 AM   NAME: Jonathon Moss   :  1975   MRN:  [id number]   Attending: Darylene Diener, MD  PCP:  Joy Cueva MD  Treatment Team: Attending Provider: Darylene Diener, MD; Care Manager: Neena [facility]; Utilization Review: Jomar Lobato, RN; Nurse Practitioner: Marla Tatum NP; Care Manager: Matt Neves RN; Hospitalist: Ashlee Velez NP; Utilization Review: Enzo Edwards RN; Charge Nurse: Magdalena Gomez; Nurse Practitioner: Colleen Montana NP; Physical Therapist: Rhina Velázquez DPT; Occupational Therapist: Nu Reeves    Full Code   SUBJECTIVE:   Mr. Anoop Myers is a 54 yo male with PMH of DM, HTN who presented with c/o left sided weakness and left facial droop 19.   CT head showed age indeterminate infarctions within the left corona radiata/caudate.  CTA head/neck showed stenosis at the distal segment of the right vertebral artery and multifocal stenosis in the P2 segment of the left posterior cerebral artery. MRI brain showed acute to early subacute infarcts in the left cerebellar hemisphere, in the periventricular deep left frontoparietal white matter and likely additional areas of restricted diffusion in the right paramedian yariel.   Echo +PFO.  On statin, ASA.  Has been difficult to place in STR. Plans for 4 week event monitor on DC and if no arrhythmia PFO closure recommended. Pt remains stable today. Awaiting placement, medicaid pending. Denies complaints. Progressing well with PT. Past Medical History:   Diagnosis Date    Acute ischemic stroke ([address] 2019    Acute pancreatitis 2014    Cerebrovascular accident (CVA) due to embolism ([address] 2019    Diabetes ([address] 2002    Diabetes ([address]     Diabetes mellitus     Hypertension      No results found for this or any previous visit (from the past 25 hour(s)).   No Known Allergies  Current Facility-Administered Medications   Medication Dose Route Frequency Provider Last Rate Last Dose    magnesium oxide (MAG-OX) tablet 400 mg  400 mg Oral BID Tea Gary, NP   400 mg at 05/18/20 0843    metFORMIN (GLUCOPHAGE) tablet 500 mg  500 mg Oral BID WITH MEALS Joey Calle NP   500 mg at 05/18/20 0843    acetaminophen (TYLENOL) tablet 650 mg  650 mg Oral Q4H PRN Berto Borja MD   650 mg at 05/04/20 5425    diphenhydrAMINE (BENADRYL) capsule 25 mg  25 mg Oral Q6H PRN Petr Dover MD   25 mg at 05/17/20 1741    acetaminophen (TYLENOL) tablet 650 mg  650 mg Oral Keila Angelo MD   650 mg at 05/18/20 0555    lip protectant (BLISTEX) ointment 1 Each  1 Each Topical PRN Mat Sapp MD        metoprolol succinate (TOPROL-XL) XL tablet 25 mg  25 mg Oral DAILY Berto Borja MD   25 mg at 05/18/20 0843    polyethylene glycol (MIRALAX) packet 17 g  17 g Oral DAILY PRN Mirella TARIQ DO   17 g at 05/11/20 0539    guaiFENesin (ROBITUSSIN) 100 mg/5 mL oral liquid 100 mg  100 mg Oral Q4H PRN Berto Borja MD   100 mg at 05/17/20 1742    hydrALAZINE (APRESOLINE) 20 mg/mL injection 20 mg  20 mg IntraVENous Q4H PRN Vivien Rosas MD   20 mg at 04/17/20 0510    atorvastatin (LIPITOR) tablet 80 mg  80 mg Oral QHS Vivien Rosas MD   80 mg at 05/17/20 2235    lisinopril (PRINIVIL, ZESTRIL) tablet 40 mg  40 mg Oral DAILY Vivien Rosas MD   40 mg at 05/18/20 0592    sodium chloride (NS) flush 5-40 mL  5-40 mL IntraVENous PRN Vivien Rosas MD   10 mL at 05/17/20 1453    ondansetron (ZOFRAN) injection 4 mg  4 mg IntraVENous Q4H PRN Vivien Rosas MD        aspirin chewable tablet 81 mg  81 mg Oral DAILY Vivien Rosas MD   81 mg at 05/18/20 0843    enoxaparin (LOVENOX) injection 40 mg  40 mg SubCUTAneous Q24H Vivien Rosas MD   40 mg at 05/17/20 1457    dextrose 40% (GLUTOSE) oral gel 1 Tube  15 g Oral PRN Vivien Rosas MD        glucagon [facility]) injection 1 mg  1 mg IntraMUSCular PRN Kait Bobby MD        dextrose (D50W) injection syrg 12.5-25 g  25-50 mL IntraVENous PRN Kati Bobby MD           Review of Systems negative with exception of pertinent positives noted above  PHYSICAL EXAM     Visit Vitals  /84 (BP 1 Location: Right arm, BP Patient Position: At rest)   Pulse 83   Temp 98.3 °F (36.8 °C)   Resp 17   Ht 5' 6\" (1.676 m)   Wt 79.8 kg (175 lb 14.4 oz)   SpO2 97%   BMI 28.39 kg/m²      Temp (24hrs), Av.2 °F (36.8 °C), Min:97.4 °F (36.3 °C), Max:98.7 °F (37.1 °C)    Oxygen Therapy  O2 Sat (%): 97 % (20 1200)  Pulse via Oximetry: 75 beats per minute (20 0400)  O2 Device: Room air (20 1648)  No intake or output data in the 24 hours ending 20 1501       General:       No acute distress    Lungs:          CTA bilaterally. Resp even and nonlabored  Heart:            S1S2 present without murmurs rubs gallops. RRR. No LE edema  Abdomen:    Soft, non tender, non distended. BS present  Extremities: No cyanosis. Moves LUE at elbow  Neurologic:  moves right hand and raises arm at elbow moderately, moves left arm at elbow mildly slurred speech.  PERRLA, strength 4/5 RUE/RLE,  2/5 LUE, LLE 1/5    Results summary of Diagnostic Studies/Procedures copied from within [facility] EMR:        Edmar Dixon 96 Problems    Diagnosis Date Noted    Hypomagnesemia 2020    PFO (patent foramen ovale) 2019    Arrhythmia 12/15/2019    Hyperlipemia     [facility]     Type 2 diabetes mellitus ([facility]     Hypertension      Plan:       Acute embolic stroke  MRI brain showed acute to early subacute infarcts in the left cerebellar hemisphere, in the periventricular deep left frontoparietal white matter and likely additional areas of restricted diffusion in the right paramedian yariel.    +PFO on echo  On ASA, statin  Will need 4 week event monitor on DC, if no arrhythmia then will need PFO closure      Hypomagnesemia   on oral supplementation        Hypertension  Continue metoprolol and ACE inhibitor  Goal BP <130/80     Type 2 diabetes mellitus:    Monitor, BGL controlled   A1C 6.9      Medically stable for DC.  Awaiting Medicaid approval.         Notes, labs, VS, diagnostic testing reviewed  Time spent with pt 20 min           DVT Prophylaxis: lovenox     Plan of Care Discussed with: Supervising MD Dr. Harper Swenson, care team, pt        Shannan Moore NP      Pt updates wife daily.  Does not need me to call per pt.

## 2020-05-19 PROCEDURE — 74011250637 HC RX REV CODE- 250/637: Performed by: INTERNAL MEDICINE

## 2020-05-19 PROCEDURE — 74011250637 HC RX REV CODE- 250/637: Performed by: NURSE PRACTITIONER

## 2020-05-19 PROCEDURE — 97530 THERAPEUTIC ACTIVITIES: CPT

## 2020-05-19 PROCEDURE — 74011250637 HC RX REV CODE- 250/637: Performed by: HOSPITALIST

## 2020-05-19 PROCEDURE — 74011250636 HC RX REV CODE- 250/636: Performed by: INTERNAL MEDICINE

## 2020-05-19 PROCEDURE — 65270000029 HC RM PRIVATE

## 2020-05-19 RX ADMIN — METFORMIN HYDROCHLORIDE 500 MG: 500 TABLET ORAL at 09:18

## 2020-05-19 RX ADMIN — Medication 400 MG: at 09:18

## 2020-05-19 RX ADMIN — ENOXAPARIN SODIUM 40 MG: 40 INJECTION SUBCUTANEOUS at 14:53

## 2020-05-19 RX ADMIN — GUAIFENESIN 100 MG: 100 SOLUTION ORAL at 17:12

## 2020-05-19 RX ADMIN — METOPROLOL SUCCINATE 25 MG: 25 TABLET, FILM COATED, EXTENDED RELEASE ORAL at 09:18

## 2020-05-19 RX ADMIN — Medication 400 MG: at 17:11

## 2020-05-19 RX ADMIN — ASPIRIN 81 MG 81 MG: 81 TABLET ORAL at 09:18

## 2020-05-19 RX ADMIN — ACETAMINOPHEN 650 MG: 325 TABLET, FILM COATED ORAL at 05:57

## 2020-05-19 RX ADMIN — ACETAMINOPHEN 650 MG: 325 TABLET, FILM COATED ORAL at 21:51

## 2020-05-19 RX ADMIN — LISINOPRIL 40 MG: 20 TABLET ORAL at 09:18

## 2020-05-19 RX ADMIN — DIPHENHYDRAMINE HYDROCHLORIDE 25 MG: 25 CAPSULE ORAL at 17:12

## 2020-05-19 RX ADMIN — ATORVASTATIN CALCIUM 80 MG: 80 TABLET, FILM COATED ORAL at 21:51

## 2020-05-19 RX ADMIN — ACETAMINOPHEN 650 MG: 325 TABLET, FILM COATED ORAL at 14:53

## 2020-05-19 RX ADMIN — METFORMIN HYDROCHLORIDE 500 MG: 500 TABLET ORAL at 17:11

## 2020-05-19 NOTE — PROGRESS NOTES
Progress Note    2020  Admit Date: 2019  9:07 AM   NAME: Dora Neal   :  1975   MRN:  [id number]   Attending: Jerrell Frey MD  PCP:  Kj Sullivan MD  Treatment Team: Attending Provider: Jerrell Frey MD; Care Manager: Kapil Henderson LM; Utilization Review: Porsche Nunez RN; Nurse Practitioner: Ronaldo Figueroa NP; Care Manager: Bridger Mcrae RN; Hospitalist: Maty Bryant NP; Utilization Review: Ladan Harrington RN; Nurse Practitioner: Santi Choudhary NP; Charge Nurse: Keysha Leyva Primary Nurse: Sabrina Huff RN; Physical Therapy Assistant: Skyla Yang PTA    Full Code   SUBJECTIVE:   Mr. Anjel Gil is a 52 yo male with PMH of DM, HTN who presented with c/o left sided weakness and left facial droop 19.   CT head showed age indeterminate infarctions within the left corona radiata/caudate.  CTA head/neck showed stenosis at the distal segment of the right vertebral artery and multifocal stenosis in the P2 segment of the left posterior cerebral artery. MRI brain showed acute to early subacute infarcts in the left cerebellar hemisphere, in the periventricular deep left frontoparietal white matter and likely additional areas of restricted diffusion in the right paramedian yariel.   Echo +PFO.  On statin, ASA.  Has been difficult to place in STR. Plans for 4 week event monitor on DC and if no arrhythmia PFO closure recommended. Pt remains stable today. Awaiting placement, medicaid pending. Denies complaints. Progressing well with PT. Past Medical History:   Diagnosis Date    Acute ischemic stroke ([address] 2019    Acute pancreatitis 2014    Cerebrovascular accident (CVA) due to embolism ([address] 2019    Diabetes ([address] 2002    Diabetes [facility]     Diabetes mellitus     Hypertension      No results found for this or any previous visit (from the past 25 hour(s)).   No Known Allergies  Current Facility-Administered Medications Medication Dose Route Frequency Provider Last Rate Last Dose    magnesium oxide (MAG-OX) tablet 400 mg  400 mg Oral BID Tea Gary, NP   400 mg at 05/19/20 7849    metFORMIN (GLUCOPHAGE) tablet 500 mg  500 mg Oral BID WITH MEALS Faith Aponte NP   500 mg at 05/19/20 6745    acetaminophen (TYLENOL) tablet 650 mg  650 mg Oral Q4H PRN Treva Wakefield MD   650 mg at 05/04/20 7217    diphenhydrAMINE (BENADRYL) capsule 25 mg  25 mg Oral Q6H PRN Ryne Landis MD   25 mg at 05/18/20 1830    acetaminophen (TYLENOL) tablet 650 mg  650 mg Oral Siobhan Brady MD   650 mg at 05/19/20 0557    lip protectant (BLISTEX) ointment 1 Each  1 Each Topical PRN Lesly Herrera MD        metoprolol succinate (TOPROL-XL) XL tablet 25 mg  25 mg Oral DAILY Treva Wakefield MD   25 mg at 05/19/20 0918    polyethylene glycol (MIRALAX) packet 17 g  17 g Oral DAILY PRN Nicholas Shaan C, DO   17 g at 05/11/20 0539    guaiFENesin (ROBITUSSIN) 100 mg/5 mL oral liquid 100 mg  100 mg Oral Q4H PRN Treva Wakefield MD   100 mg at 05/18/20 1830    hydrALAZINE (APRESOLINE) 20 mg/mL injection 20 mg  20 mg IntraVENous Q4H PRN Antoinette Lawson MD   20 mg at 04/17/20 0510    atorvastatin (LIPITOR) tablet 80 mg  80 mg Oral QHS Antoinette Lawson MD   80 mg at 05/18/20 2247    lisinopril (PRINIVIL, ZESTRIL) tablet 40 mg  40 mg Oral DAILY Antoinette Lawson MD   40 mg at 05/19/20 6656    sodium chloride (NS) flush 5-40 mL  5-40 mL IntraVENous PRN Antoinette Lawson MD   10 mL at 05/17/20 1453    ondansetron (ZOFRAN) injection 4 mg  4 mg IntraVENous Q4H PRN Antoinette Lawson MD        aspirin chewable tablet 81 mg  81 mg Oral DAILY Antoinette Lawson MD   81 mg at 05/19/20 3740    enoxaparin (LOVENOX) injection 40 mg  40 mg SubCUTAneous Q24H Antoinette Lawson MD   40 mg at 05/18/20 1444    dextrose 40% (GLUTOSE) oral gel 1 Tube  15 g Oral PRN Antoinette Lawson MD        glucagon [facility]) injection 1 mg  1 mg IntraMUSCular PRN Bailey Nguyen MD        dextrose (D50W) injection syrg 12.5-25 g  25-50 mL IntraVENous PRN Bailey Nguyen MD           Review of Systems negative with exception of pertinent positives noted above  PHYSICAL EXAM     Visit Vitals  /89 (BP 1 Location: Right arm, BP Patient Position: At rest)   Pulse 71   Temp 97.6 °F (36.4 °C)   Resp 17   Ht 5' 6\" (1.676 m)   Wt 79.8 kg (175 lb 14.4 oz)   SpO2 97%   BMI 28.39 kg/m²      Temp (24hrs), Av.1 °F (36.7 °C), Min:97.6 °F (36.4 °C), Max:98.6 °F (37 °C)    Oxygen Therapy  O2 Sat (%): 97 % (20 0800)  Pulse via Oximetry: 75 beats per minute (20 0400)  O2 Device: Room air (20 1648)  No intake or output data in the 24 hours ending 20 1142     General:       No acute distress    Lungs:          CTA bilaterally. Resp even and nonlabored  Heart:            S1S2 present without murmurs rubs gallops. RRR. No LE edema  Abdomen:    Soft, non tender, non distended. BS present  Extremities: No cyanosis. Moves LUE at elbow  Neurologic:  moves right hand and raises arm at elbow moderately, moves left arm at elbow mildly slurred speech.  PERRLA, strength 4/5 RUE/RLE,  2/5 LUE, LLE 1/5    Results summary of Diagnostic Studies/Procedures copied from within [facility] EMR:        Edmar Dixon 96 Problems    Diagnosis Date Noted    Hypomagnesemia 2020    PFO (patent foramen ovale) 2019    Arrhythmia 12/15/2019    Hyperlipemia     Acute [facility] ([facility]     Type 2 diabetes mellitus ([facility]     Hypertension      Plan:  Acute embolic stroke  MRI brain showed acute to early subacute infarcts in the left cerebellar hemisphere, in the periventricular deep left frontoparietal white matter and likely additional areas of restricted diffusion in the right paramedian yariel.    +PFO on echo  On ASA, statin  Will need 4 week event monitor on DC, if no arrhythmia then will need PFO closure      Hypomagnesemia   on oral supplementation        Hypertension  Continue metoprolol and ACE inhibitor  Goal BP <130/80     Type 2 diabetes mellitus:    Monitor, BGL controlled   A1C 6.9      Medically stable for DC.  Awaiting Medicaid approval.         Notes, labs, VS, diagnostic testing reviewed  Time spent with pt 20 min           DVT Prophylaxis: lovenox        Plan of Care Discussed with: Supervising MD  Dr. Kendall Vázquez, care team, pt      Izzy Daniels, NP         Pt to update wife on plan of care per his request.

## 2020-05-19 NOTE — PROGRESS NOTES
Problem: Mobility Impaired (Adult and Pediatric)  Goal: *Acute Goals and Plan of Care  Description  GOALS REVIEWED ON 5/12/2020 (BOLD INDICATES MOST RECENTLY UPDATED GOAL):  1. Patient will perform bed mobility with MODIFIED INDEPENDENCE and 0 verbal cues within 7 treatment days. 2. Patient will perform transfer bed to chair/wheelchair with MODIFIED INDEPENDENCE within 7 treatment days. 3. Patient will demonstrate FAIR+ dynamic balance throughout ambulation within 7 treatment days. 4. Patient demonstrate 3+/5 strength in L LE hip flexion, hip abduction, and hip adduction within 7 treatment days. 5. Patient will ambulate 300ft+ with STAND BY ASSIST and least retrictive assistive device and L LE AFO within 7 treatment days. 6. Patient will perform wheelchair mobility 75 ft with modified independence within 7 treatment days. 7. Patient will don/doff LUE sling for protection of L shoulder during transfers/gait with set up within 7 treatment days. 8. Patient will instruct caregiver how to don/doff L LE AFO with independence within 7 treatment days. PHYSICAL THERAPY: Daily Note and PM 5/19/2020  INPATIENT: PT Visit Days : 6  Payor: [address] [place], [place] [address]y [address] / Plan: [address] [place] [address] / Product Type: Medicaid /       NAME/AGE/GENDER: Joe Silveira is a 53 y.o. male   PRIMARY DIAGNOSIS: Acute ischemic stroke ([facility] [address] [I63.9]  Cerebrovascular accident (CVA) due to embolism ([facility] [address] [O18.0] Acute embolic stroke ([facility] [address] Acute embolic stroke ([facility] [address]       ICD-10: Treatment Diagnosis:   · Difficulty in walking, Not elsewhere classified (R26.2)  · Hemiplegia and hemiparesis following cerebral infarction affecting left non-dominant side (L49.527)   Precaution/Allergies:  Patient has no known allergies. ASSESSMENT:     Mr. Henriquez is a 53year old admitted R MCA CVA. Patient was supine upon contact and agreeable to PT.  Patient performs supine to sit with supervision, additional time, and cues for improved/proper technique (ie scooting up towards HOB to utilize rail better during transfer). Donned L LE AFO and tennis shoes with total assistance. Patient seems to have increased difficulty performing sit to stand with LLE AFO donned. Patient transferred to standing with CGA and bed elevated whereas he was requiring SBA-supervision prior. Once standing patient ambulates 300ft then 100ft with hemiwalker and gait belt. Encouraged patient to ambulate further before taking a seated rest break but he declined. Patient could ambulate 400' without stopping but he has gotten into the habit of taking a rest break at the nurses station. Patient took a seated rest break in between ambulation bouts. Goals are still ongoing. Of note, patient did slid off the bedside commode into the floor over the weekend when he was attempting to  his ID wrist band off the floor. Overall slow progress towards physical therapy goals. Patient's goals listed above are still appropriate. Will continue skilled PT to address remaining deficits. This section established at most recent assessment   PROBLEM LIST (Impairments causing functional limitations):  1. Decreased Strength  2. Decreased ADL/Functional Activities  3. Decreased Transfer Abilities  4. Decreased Ambulation Ability/Technique  5. Decreased Balance  6. Increased Pain  7. Decreased Activity Tolerance  8. Increased Fatigue  9. Decreased Flexibility/Joint Mobility   INTERVENTIONS PLANNED: (Benefits and precautions of physical therapy have been discussed with the patient.)  1. Balance Exercise  2. Bed Mobility  3. Family Education  4. Gait Training  5. Home Exercise Program (HEP)  6. Manual Therapy  7. Neuromuscular Re-education/Strengthening  8. Range of Motion (ROM)  9. Therapeutic Activites  10. Therapeutic Exercise/Strengthening  11.  Transfer Training     TREATMENT PLAN: Frequency/Duration: 5 times a week for duration of hospital stay  Rehabilitation Potential For Stated Goals: Good     REHAB RECOMMENDATIONS (at time of discharge pending progress):    Placement: It is my opinion, based on this patient's performance to date, that Mr. Leonid Baumann may benefit from intensive therapy at an [facility] after discharge due to a probable need for close medical supervision by a rehab physician, a probable need for multiple therapy disciplines and potential to make ongoing and sustainable functional improvement that is of practical value. .  Equipment:    TBD pending progress with therapy. HISTORY:   History of Present Injury/Illness (Reason for Referral):  Per H&P: \"Pt is a 54 y/o smoker with DM, HTN, who presented to ER with L leg and arm weakness, L facial droop, dysarthria. First noted L leg weakness late night 12/11 when he woke up to go to the bathroom. He was normal when he went to bed around 1030. Woke up this morning and had persistent weakness L leg and also now noted in L arm. EMS called. Noted to have slurred speech and L facial droop as well. Code S called in ER around 9am.  MRI with acute infarcts in L cerebellar hemisphere, deep frontoparietal white matter, and R paramedian yariel. Also noted old lacunar infarcts. No large vessel occlusion on CTA, but some stenosis noted. No hx afib, TIA, CVA. No CP, palpitations, SOB. \"  Past Medical History/Comorbidities:   Mr. Leonid Baumann  has a past medical history of Acute ischemic stroke ([address] (12/12/2019), Acute pancreatitis (11/19/2014), Cerebrovascular accident (CVA) due to embolism ([address] (12/12/2019), Diabetes ([address] (2002), Diabetes ([address] Diabetes mellitus, and Hypertension.  He also has no past medical history of Arthritis, Asthma, Autoimmune disease ([address] CAD (coronary artery disease), Cancer ([address] Chronic kidney disease, COPD, Dementia, Dementia ([address] Heart failure ([address] Ill-defined condition, Infectious disease, Liver disease, Other ill-defined conditions(799.89), Psychiatric disorder, PUD (peptic ulcer disease), Seizures ([facility] [address] or Sleep disorder. Mr. Ellis Nair  has a past surgical history that includes hx hernia repair and hx orthopaedic. Social History/Living Environment:   Home Environment: Apartment  # Steps to Enter: 12  Rails to Enter: Yes  [employer] : Bilateral  One/Two Story Residence: One story  Living Alone: No  Support Systems: Spouse/Significant Other/Partner  Patient Expects to be Discharged to[de-identified] Unknown  Current DME Used/Available at Home: None  Tub or Shower Type: Tub/Shower combination  Prior Level of Function/Work/Activity:  Independent, lives with wife in 2nd story 1 level apartment. No recent falls. Number of Personal Factors/Comorbidities that affect the Plan of Care: 1-2: MODERATE COMPLEXITY   EXAMINATION:   Most Recent Physical Functioning:   Gross Assessment: left hip grossly 2/5 to 3-/5                       Balance:  Sitting: Impaired  Sitting - Static: Good (unsupported)  Sitting - Dynamic: Fair (occasional)  Standing: Impaired  Standing - Static: Good  Standing - Dynamic : Fair Bed Mobility:  Supine to Sit: Supervision  Wheelchair Mobility: NT     Transfers:  Sit to Stand: Contact guard assistance  Stand to Sit: Contact guard assistance  Gait:     Base of Support: Center of gravity altered  Speed/Clotilde: Slow  Gait Abnormalities: Hemiplegic  Distance (ft): (300' THEN 100')  Assistive Device: Walker luke;Gait belt;Brace/Splint  Ambulation - Level of Assistance: Contact guard assistance;Stand-by assistance  Interventions: Safety awareness training; Tactile cues; Verbal cues      Body Structures Involved:  1. Nerves  2. Voice/Speech  3. Bones  4. Joints  5. Muscles Body Functions Affected:  1. Mental  2. Sensory/Pain  3. Neuromusculoskeletal  4. Movement Related Activities and Participation Affected:  1. General Tasks and Demands  2. Mobility  3. Self Care  4.  Interpersonal Interactions and Relationships   Number of elements that affect the Plan of Care: 4+: HIGH COMPLEXITY CLINICAL PRESENTATION:   Presentation: Evolving clinical presentation with changing clinical characteristics: MODERATE COMPLEXITY   CLINICAL DECISION MAKIN [facility] Mobility Inpatient Short Form  How much difficulty does the patient currently have. .. Unable A Lot A Little None   1. Turning over in bed (including adjusting bedclothes, sheets and blankets)? [] 1   [] 2   [] 3   [x] 4   2. Sitting down on and standing up from a chair with arms ( e.g., wheelchair, bedside commode, etc.)   [] 1   [] 2   [x] 3   [] 4   3. Moving from lying on back to sitting on the side of the bed? [] 1   [] 2   [] 3   [x] 4   How much help from another person does the patient currently need. .. Total A Lot A Little None   4. Moving to and from a bed to a chair (including a wheelchair)? [] 1   [] 2   [x] 3   [] 4   5. Need to walk in hospital room? [] 1   [] 2   [x] 3   [] 4   6. Climbing 3-5 steps with a railing? [] 1   [x] 2   [] 3   [] 4   © , Trustees of [address] [place] [address], under license to [employer]. All rights reserved      Score:  Initial: 13 Most Recent: 19 (Date: 2020)    Interpretation of Tool:  Represents activities that are increasingly more difficult (i.e. Bed mobility, Transfers, Gait). Medical Necessity:     · Patient is expected to demonstrate progress in   · strength, range of motion, balance, coordination, and functional technique  ·  to   · increase independence with all mobility. · .  Reason for Services/Other Comments:  · Patient continues to require skilled intervention due to   · medical complications and mobility deficits which impact his level of function, safety, and independence as indicated above.    · .   Use of outcome tool(s) and clinical judgement create a POC that gives a: Questionable prediction of patient's progress: MODERATE COMPLEXITY        TREATMENT:      Pre-treatment Symptoms/Complaints: see above  Pain: Initial: 0/10 Post Session:  0/10     Therapeutic Activity: (    30 Minutes): Therapeutic activities including bed mobility training, transfer training from various surface heights, ambulation on level ground, static/dynamic standing balance, posture training, instruction in sequencing with luke walker and gait mechanics, and patient education to improve mobility, strength and balance. Required moderate Safety awareness training; Tactile cues; Verbal cues to promote static and dynamic balance in standing, promote coordination of bilateral, lower extremity(s) and promote motor control of bilateral, lower extremity(s). Braces/Orthotics/Lines/Etc:   · L LE AFO  Treatment/Session Assessment:    · Response to Treatment:  See above  · Interdisciplinary Collaboration:   o Physical Therapy Assistant  o Registered Nurse  o Rehabilitation Attendant  · After treatment position/precautions:   o Up in [facility] with ambulation team; needs met; patient in NAD   · Compliance with Program/Exercises: Compliant all of the time  · Recommendations/Intent for next treatment session: \"Next visit will focus on advancements to more challenging activities and reduction in assistance provided\".     Total Treatment Duration:  PT Patient Time In/Time Out  Time In: 0905  Time Out: [facility]

## 2020-05-20 PROCEDURE — 74011250637 HC RX REV CODE- 250/637: Performed by: HOSPITALIST

## 2020-05-20 PROCEDURE — 74011250637 HC RX REV CODE- 250/637: Performed by: INTERNAL MEDICINE

## 2020-05-20 PROCEDURE — 74011250637 HC RX REV CODE- 250/637: Performed by: NURSE PRACTITIONER

## 2020-05-20 PROCEDURE — 74011250636 HC RX REV CODE- 250/636: Performed by: INTERNAL MEDICINE

## 2020-05-20 PROCEDURE — 65270000029 HC RM PRIVATE

## 2020-05-20 RX ADMIN — METOPROLOL SUCCINATE 25 MG: 25 TABLET, FILM COATED, EXTENDED RELEASE ORAL at 08:23

## 2020-05-20 RX ADMIN — METFORMIN HYDROCHLORIDE 500 MG: 500 TABLET ORAL at 08:23

## 2020-05-20 RX ADMIN — ACETAMINOPHEN 650 MG: 325 TABLET, FILM COATED ORAL at 21:42

## 2020-05-20 RX ADMIN — ACETAMINOPHEN 650 MG: 325 TABLET, FILM COATED ORAL at 05:51

## 2020-05-20 RX ADMIN — Medication 400 MG: at 08:23

## 2020-05-20 RX ADMIN — DIPHENHYDRAMINE HYDROCHLORIDE 25 MG: 25 CAPSULE ORAL at 17:12

## 2020-05-20 RX ADMIN — METFORMIN HYDROCHLORIDE 500 MG: 500 TABLET ORAL at 17:11

## 2020-05-20 RX ADMIN — ASPIRIN 81 MG 81 MG: 81 TABLET ORAL at 08:23

## 2020-05-20 RX ADMIN — GUAIFENESIN 100 MG: 100 SOLUTION ORAL at 17:12

## 2020-05-20 RX ADMIN — LISINOPRIL 40 MG: 20 TABLET ORAL at 08:23

## 2020-05-20 RX ADMIN — Medication 400 MG: at 17:11

## 2020-05-20 RX ADMIN — ENOXAPARIN SODIUM 40 MG: 40 INJECTION SUBCUTANEOUS at 15:35

## 2020-05-20 RX ADMIN — ACETAMINOPHEN 650 MG: 325 TABLET, FILM COATED ORAL at 15:35

## 2020-05-20 RX ADMIN — ATORVASTATIN CALCIUM 80 MG: 80 TABLET, FILM COATED ORAL at 21:42

## 2020-05-20 NOTE — PROGRESS NOTES
Hospitalist Progress Note    Subjective:   Daily Progress Note: 5/20/2020 4:07 PM    Patient admitted 12/12/19 with acute right side embolic stroke with left side residual weakness and left facial droop. CT brain on admission with indeterminate infarctions within the left corona radiata/caudate.  CTA of the head and neck with stenosis at the distal segment of the right  Vertebral artery and multifocal stenosis in the P2 segment of the left posterior cerebral artery.  MRI brain with acute to early subacute infarcts in the left cerebellar hemisphere in the periventricular deep left frontoparietal white matter and likely additional areas of restricted diffusion in the right paramedian yariel. Echo with + PFO, on statin and ASA.  Will need event monitor on discharge and if no arrhythmia, PFO closure recommended.  Wife informs CM she does not want patient at home, they were planning to separate prior to this stroke.  CM attempting  to place in STR, medicaid pending.    4/9:  Speech remains slightly slurred, SLP signing off as clinical indication no longer needed.    4/10: Mag critically low today: 1.3. Replaced   4/14:  Continues to wait for placement.  Good participation with PT/OT. Federico Clancy a little use of right hand, still unable to .    5/2:  Fitted for AFO today per orthotist.  Continued wait for medicaid and SSI approval.  Metformin dosing adjusted a few days ago.  Glucose 118-126 x 24 hours.  CM spoke with wife, still does not want patient to come home, even if he has to go to a shelter.    5/11: [facility] waiting for disability rating, Medicaid. . Dietician in.  Participating with PT/OT, ambulated total of 400 feet.     5/20:  Conversant today. In good spirits despite home and marital situation.       Current Facility-Administered Medications   Medication Dose Route Frequency    magnesium oxide (MAG-OX) tablet 400 mg  400 mg Oral BID    metFORMIN (GLUCOPHAGE) tablet 500 mg  500 mg Oral BID WITH MEALS    acetaminophen (TYLENOL) tablet 650 mg  650 mg Oral Q4H PRN    diphenhydrAMINE (BENADRYL) capsule 25 mg  25 mg Oral Q6H PRN    acetaminophen (TYLENOL) tablet 650 mg  650 mg Oral Q8H    lip protectant (BLISTEX) ointment 1 Each  1 Each Topical PRN    metoprolol succinate (TOPROL-XL) XL tablet 25 mg  25 mg Oral DAILY    polyethylene glycol (MIRALAX) packet 17 g  17 g Oral DAILY PRN    guaiFENesin (ROBITUSSIN) 100 mg/5 mL oral liquid 100 mg  100 mg Oral Q4H PRN    hydrALAZINE (APRESOLINE) 20 mg/mL injection 20 mg  20 mg IntraVENous Q4H PRN    atorvastatin (LIPITOR) tablet 80 mg  80 mg Oral QHS    lisinopril (PRINIVIL, ZESTRIL) tablet 40 mg  40 mg Oral DAILY    sodium chloride (NS) flush 5-40 mL  5-40 mL IntraVENous PRN    ondansetron (ZOFRAN) injection 4 mg  4 mg IntraVENous Q4H PRN    aspirin chewable tablet 81 mg  81 mg Oral DAILY    enoxaparin (LOVENOX) injection 40 mg  40 mg SubCUTAneous Q24H    dextrose 40% (GLUTOSE) oral gel 1 Tube  15 g Oral PRN    glucagon (GLUCAGEN) injection 1 mg  1 mg IntraMUSCular PRN    dextrose (D50W) injection syrg 12.5-25 g  25-50 mL IntraVENous PRN        Review of Systems  A comprehensive review of systems was negative except for that written in the HPI. Objective:     Visit Vitals  /66 (BP 1 Location: Right arm, BP Patient Position: At rest)   Pulse 67   Temp 98.2 °F (36.8 °C)   Resp 16   Ht 5' 6\" (1.676 m)   Wt 79.8 kg (175 lb 14.4 oz)   SpO2 96%   BMI 28.39 kg/m²      O2 Device: Room air    Temp (24hrs), Av °F (36.7 °C), Min:97.7 °F (36.5 °C), Max:98.4 °F (36.9 °C)    General appearance: Conversant, oriented. Denies need or new issues.    Head: Normocephalic, without obvious abnormality, atraumatic  Eyes: conjunctivae/corneas clear. PERRL  Throat: Continued mild left facial weakness.  Lips, mucosa, and tongue normal. Teeth and gums normal.  Eating normally.   Neck: supple, symmetrical, trachea midline, no JVD  Lungs: clear to auscultation bilaterally  Heart: regular rate and rhythm, S1, S2 normal, no murmur, click, rub or gallop  Abdomen: soft, non-tender. Bowel sounds normal. No masses,  no organomegaly  Extremities: Persistent left side upper and lower residual weakness, improved since admission.  Doing well with right hand only. Skin: Skin color, texture, turgor normal. No rashes or lesions  Neurologic: As above.     Additional comments: Notes,orders, test results, vitals reviewed    Data Review:  Last labs    Ref.  Range 5/13/2020 05:29   WBC Latest Ref Range: 4.3 - 11.1 K/uL 5.6   RBC Latest Ref Range: 4.23 - 5.6 M/uL 4.17 (L)   HGB Latest Ref Range: 13.6 - 17.2 g/dL 11.2 (L)   HCT Latest Ref Range: 41.1 - 50.3 % 35.0 (L)   MCV Latest Ref Range: 79.6 - 97.8 FL 83.9   MCH Latest Ref Range: 26.1 - 32.9 PG 26.9   MCHC Latest Ref Range: 31.4 - 35.0 g/dL 32.0   RDW Latest Ref Range: 11.9 - 14.6 % 16.0 (H)   PLATELET Latest Ref Range: 150 - 450 K/uL 181   MPV Latest Ref Range: 9.4 - 12.3 FL 11.6   ABSOLUTE NRBC Latest Ref Range: 0.0 - 0.2 K/uL 0.00   Sodium Latest Ref Range: 136 - 145 mmol/L 140   Potassium Latest Ref Range: 3.5 - 5.1 mmol/L 4.2   Chloride Latest Ref Range: 98 - 107 mmol/L 107   CO2 Latest Ref Range: 21 - 32 mmol/L 26   Anion gap Latest Ref Range: 7 - 16 mmol/L 7   Glucose Latest Ref Range: 65 - 100 mg/dL 99   BUN Latest Ref Range: 6 - 23 MG/DL 20   Creatinine Latest Ref Range: 0.8 - 1.5 MG/DL 1.01   Calcium Latest Ref Range: 8.3 - 10.4 MG/DL 9.2   Magnesium Latest Ref Range: 1.8 - 2.4 mg/dL 1.7 (L)   GFR est non-AA Latest Ref Range: >60 ml/min/1.73m2 >60   GFR est AA Latest Ref Range: >60 ml/min/1.73m2 >60     Assessment/Plan:   Acute to early subacute infarcts in the left cerebellar hemisphere, in the periventricular deep left frontoparietal white matter and likely additional areas of restricted diffusion in the right paramedian yariel.  Old lacunar infarcts also.                  Making good progress with PT/OT                Speech and swallowing improved to the extent SLP signed off                 Pending placement after approved for SSI and medicaid:    extended     Dysarthria due to stroke:  Improved immensely, able to understand all speech.               SLP releasing 4/14      Difficult placement with marital discord prior to stroke:  Wife does not want him at home:  CM working on tirelessly              Now issues obtaining MR for [employer], needs SSI              CM spoke with wife 5/1, she still does not want patient to come  home. Patient spoke with her again 5/19, does not even know  where she lives     Hypertension:  Continue lisinopril, toprol XL     IDDM II:  Continue glucophage, diabetic diet.  A1C: 8.4, rechecking               adding SSI 4/15              DM education and  management continues to follow intermittently,              increased glucophage frequency 4/30              Discontinue SSI      Arrhythmia:  On beta blocker     PFO 12/17/2019:                Will need 4 week event monitor on  discharge               Will need PFO closure at some point after discharge per Cards      Hypomagnesemia:  Replace and recheck               Increasing daily 400 mg dose to bid 5/2.             monitor weekly     Care Plan discussed with: Patient and Nurse    Signed By: Ramon Barkley NP     May 20, 2020

## 2020-05-20 NOTE — PROGRESS NOTES
Problem: Patient Education: Go to Patient Education Activity  Goal: Patient/Family Education  Outcome: Progressing Towards Goal     Problem: Pressure Injury - Risk of  Goal: *Prevention of pressure injury  Description: Document Dewey Scale and appropriate interventions in the flowsheet. Outcome: Progressing Towards Goal  Note: Pressure Injury Interventions:  Sensory Interventions: Assess changes in LOC    Moisture Interventions: Apply protective barrier, creams and emollients    Activity Interventions: Increase time out of bed, Pressure redistribution bed/mattress(bed type), PT/OT evaluation    Mobility Interventions: HOB 30 degrees or less, Pressure redistribution bed/mattress (bed type), PT/OT evaluation    Nutrition Interventions: Document food/fluid/supplement intake    Friction and Shear Interventions: HOB 30 degrees or less                Problem: Patient Education: Go to Patient Education Activity  Goal: Patient/Family Education  Outcome: Progressing Towards Goal     Problem: Falls - Risk of  Goal: *Absence of Falls  Description: Document Jeanne Fall Risk and appropriate interventions in the flowsheet.   Outcome: Progressing Towards Goal  Note: Fall Risk Interventions:  Mobility Interventions: Bed/chair exit alarm, Communicate number of staff needed for ambulation/transfer, OT consult for ADLs, Patient to call before getting OOB, PT Consult for mobility concerns    Mentation Interventions: Adequate sleep, hydration, pain control, Bed/chair exit alarm    Medication Interventions: Bed/chair exit alarm, Patient to call before getting OOB, Teach patient to arise slowly    Elimination Interventions: Bed/chair exit alarm, Call light in reach, Patient to call for help with toileting needs, Stay With Me (per policy), Toilet paper/wipes in reach, Toileting schedule/hourly rounds    History of Falls Interventions: Bed/chair exit alarm         Problem: Patient Education: Go to Patient Education Activity  Goal: Patient/Family Education  Outcome: Progressing Towards Goal     Problem: Diabetes Self-Management  Goal: *Disease process and treatment process  Description: Define diabetes and identify own type of diabetes; list 3 options for treating diabetes. Outcome: Progressing Towards Goal  Goal: *Incorporating nutritional management into lifestyle  Description: Describe effect of type, amount and timing of food on blood glucose; list 3 methods for planning meals. Outcome: Progressing Towards Goal  Goal: *Incorporating physical activity into lifestyle  Description: State effect of exercise on blood glucose levels. Outcome: Progressing Towards Goal  Goal: *Developing strategies to promote health/change behavior  Description: Define the ABC's of diabetes; identify appropriate screenings, schedule and personal plan for screenings. Outcome: Progressing Towards Goal  Goal: *Using medications safely  Description: State effect of diabetes medications on diabetes; name diabetes medication taking, action and side effects. Outcome: Progressing Towards Goal  Goal: *Monitoring blood glucose, interpreting and using results  Description: Identify recommended blood glucose targets  and personal targets. Outcome: Progressing Towards Goal  Goal: *Prevention, detection, treatment of acute complications  Description: List symptoms of hyper- and hypoglycemia; describe how to treat low blood sugar and actions for lowering  high blood glucose level. Outcome: Progressing Towards Goal  Goal: *Prevention, detection and treatment of chronic complications  Description: Define the natural course of diabetes and describe the relationship of blood glucose levels to long term complications of diabetes.   Outcome: Progressing Towards Goal  Goal: *Developing strategies to address psychosocial issues  Description: Describe feelings about living with diabetes; identify support needed and support network  Outcome: Progressing Towards Goal     Problem: Patient Education: Go to Patient Education Activity  Goal: Patient/Family Education  Outcome: Progressing Towards Goal     Problem: Patient Education: Go to Patient Education Activity  Goal: Patient/Family Education  Outcome: Progressing Towards Goal     Problem: Nutrition Deficit  Goal: *Optimize nutritional status  Outcome: Progressing Towards Goal     Problem: Patient Education: Go to Patient Education Activity  Goal: Patient/Family Education  Outcome: Progressing Towards Goal     Problem: General Medical Care Plan  Goal: *Vital signs within specified parameters  Outcome: Progressing Towards Goal  Goal: *Labs within defined limits  Outcome: Progressing Towards Goal  Goal: *Absence of infection signs and symptoms  Description: Wash hand more often   Outcome: Progressing Towards Goal  Goal: *Optimal pain control at patient's stated goal  Outcome: Progressing Towards Goal  Goal: *Skin integrity maintained  Outcome: Progressing Towards Goal  Goal: *Fluid volume balance  Outcome: Progressing Towards Goal  Goal: *Optimize nutritional status  Outcome: Progressing Towards Goal  Goal: *Anxiety reduced or absent  Outcome: Progressing Towards Goal  Goal: *Progressive mobility and function (eg: ADL's)  Outcome: Progressing Towards Goal     Problem: Patient Education: Go to Patient Education Activity  Goal: Patient/Family Education  Outcome: Progressing Towards Goal     Problem: Patient Education: Go to Patient Education Activity  Goal: Patient/Family Education  Outcome: Progressing Towards Goal     Problem: Patient Education: Go to Patient Education Activity  Goal: Patient/Family Education  Outcome: Progressing Towards Goal     Problem: Patient Education: Go to Patient Education Activity  Goal: Patient/Family Education  Outcome: Progressing Towards Goal     Problem: Ischemic Stroke: Discharge Outcomes  Goal: *Verbalizes anxiety and depression are reduced or absent  Outcome: Progressing Towards Goal  Goal: *Verbalize understanding of risk factor modification(Stroke Metric)  Outcome: Progressing Towards Goal  Goal: *Hemodynamically stable  Outcome: Progressing Towards Goal  Goal: *Absence of aspiration pneumonia  Outcome: Progressing Towards Goal  Goal: *Aware of needed dietary changes  Outcome: Progressing Towards Goal  Goal: *Verbalize understanding of prescribed medications including anti-coagulants, anti-lipid, and/or anti-platelets(Stroke Metric)  Outcome: Progressing Towards Goal  Goal: *Tolerating diet  Outcome: Progressing Towards Goal  Goal: *Aware of follow-up diagnostics related to anticoagulants  Outcome: Progressing Towards Goal  Goal: *Ability to perform ADLs and demonstrates progressive mobility and function  Outcome: Progressing Towards Goal  Goal: *Absence of DVT(Stroke Metric)  Outcome: Progressing Towards Goal  Goal: *Absence of aspiration  Outcome: Progressing Towards Goal  Goal: *Optimal pain control at patient's stated goal  Outcome: Progressing Towards Goal  Goal: *Home safety concerns addressed  Outcome: Progressing Towards Goal  Goal: *Describes available resources and support systems  Outcome: Progressing Towards Goal  Goal: *Verbalizes understanding of activation of EMS(911) for stroke symptoms(Stroke Metric)  Outcome: Progressing Towards Goal  Goal: *Understands and describes signs and symptoms to report to providers(Stroke Metric)  Outcome: Progressing Towards Goal  Goal: *Neurolgocially stable (absence of additional neurological deficits)  Outcome: Progressing Towards Goal  Goal: *Verbalizes importance of follow-up with primary care physician(Stroke Metric)  Outcome: Progressing Towards Goal  Goal: *Smoking cessation discussed,if applicable(Stroke Metric)  Outcome: Progressing Towards Goal  Goal: *Depression screening completed(Stroke Metric)  Outcome: Progressing Towards Goal     Problem: Pain  Goal: *Control of Pain  Outcome: Progressing Towards Goal     Problem: Patient Education: Go to Patient Education Activity  Goal: Patient/Family Education  Outcome: Progressing Towards Goal     Problem: Patient Education: Go to Patient Education Activity  Goal: Patient/Family Education  Outcome: Progressing Towards Goal

## 2020-05-20 NOTE — PROGRESS NOTES
The documentation for this period is being entered following the guidelines as defined in the [facility] policy by Tree Tapia RN.      [id number]

## 2020-05-21 PROCEDURE — 74011250637 HC RX REV CODE- 250/637: Performed by: NURSE PRACTITIONER

## 2020-05-21 PROCEDURE — 74011250637 HC RX REV CODE- 250/637: Performed by: HOSPITALIST

## 2020-05-21 PROCEDURE — 74011250636 HC RX REV CODE- 250/636: Performed by: INTERNAL MEDICINE

## 2020-05-21 PROCEDURE — 65270000029 HC RM PRIVATE

## 2020-05-21 PROCEDURE — 74011250637 HC RX REV CODE- 250/637: Performed by: INTERNAL MEDICINE

## 2020-05-21 RX ADMIN — GUAIFENESIN 100 MG: 100 SOLUTION ORAL at 17:31

## 2020-05-21 RX ADMIN — Medication 400 MG: at 08:06

## 2020-05-21 RX ADMIN — LISINOPRIL 40 MG: 20 TABLET ORAL at 08:06

## 2020-05-21 RX ADMIN — ASPIRIN 81 MG 81 MG: 81 TABLET ORAL at 08:07

## 2020-05-21 RX ADMIN — ACETAMINOPHEN 650 MG: 325 TABLET, FILM COATED ORAL at 22:28

## 2020-05-21 RX ADMIN — ENOXAPARIN SODIUM 40 MG: 40 INJECTION SUBCUTANEOUS at 14:53

## 2020-05-21 RX ADMIN — DIPHENHYDRAMINE HYDROCHLORIDE 25 MG: 25 CAPSULE ORAL at 17:31

## 2020-05-21 RX ADMIN — METOPROLOL SUCCINATE 25 MG: 25 TABLET, FILM COATED, EXTENDED RELEASE ORAL at 08:07

## 2020-05-21 RX ADMIN — METFORMIN HYDROCHLORIDE 500 MG: 500 TABLET ORAL at 08:07

## 2020-05-21 RX ADMIN — METFORMIN HYDROCHLORIDE 500 MG: 500 TABLET ORAL at 17:31

## 2020-05-21 RX ADMIN — Medication 400 MG: at 17:31

## 2020-05-21 RX ADMIN — ATORVASTATIN CALCIUM 80 MG: 80 TABLET, FILM COATED ORAL at 22:28

## 2020-05-21 RX ADMIN — ACETAMINOPHEN 650 MG: 325 TABLET, FILM COATED ORAL at 14:53

## 2020-05-21 RX ADMIN — ACETAMINOPHEN 650 MG: 325 TABLET, FILM COATED ORAL at 05:55

## 2020-05-21 NOTE — PROGRESS NOTES
Problem: Mobility Impaired (Adult and Pediatric)  Goal: *Acute Goals and Plan of Care  Description  GOALS REVIEWED ON 5/21/2020 (BOLD INDICATES MOST RECENTLY UPDATED GOAL):  1. Patient will perform bed mobility with MODIFIED INDEPENDENCE and 0 verbal cues within 7 treatment days. PROGRESSING; CURRENTLY AT SUPERVISION  2. Patient will perform transfer bed to chair/wheelchair with SUPERVISION within 7 treatment days. PROGRESSING; CURRENTLY AT CGA  3. Patient will demonstrate FAIR+ dynamic balance throughout ambulation within 7 treatment days. PROGRESSING; CURRENTLY AT FAIR  4. Patient demonstrate 3+/5 strength in L LE hip flexion, hip abduction, hip adduction, and knee extension (quad strength) within 7 treatment days. PROGRESSING SEE STRENGTH ASSESSMENT BELOW  5. Patient will ambulate 300ft+ with STAND BY ASSIST and least retrictive assistive device and L LE AFO within 7 treatment days. PROGRESSING CURRENTLY AT CGA FOR 300FT  6. Patient will perform wheelchair mobility 75ft with SUPERVISION and 0 verbal cues within 7 treatment days. PROGRESSING CURRENTLY REQUIRING CGA FOR 50 FT  7. Patient will don/doff LUE sling for protection of L shoulder during transfers/gait with set up within 7 treatment days. PROGRESSING REQUIRING MIN-MODERATE ASSISTANCE  8. Patient will instruct caregiver how to don/doff L LE AFO with independence within 7 treatment days. 9. Patient will tolerate out of bed mobility 4 hours daily to address dynamic sitting balance, promote anterior pelvic tilt, and trunk/core strengthening within 7 treatment days.         PHYSICAL THERAPY: Daily Note, Re-evaluation and PM 5/21/2020  INPATIENT: PT Visit Days : 1  Payor: [id number] / Plan: [address] [place] [address] Avenue / Product Type: Medicaid /       NAME/AGE/GENDER: Calin Yen is a 53 y.o. male   PRIMARY DIAGNOSIS: Acute ischemic stroke ([facility] [I63.9]  Cerebrovascular accident (CVA) due to embolism ([facility] [K69.6] Acute embolic stroke ([facility] Acute embolic stroke ([facility]       ICD-10: Treatment Diagnosis:   · Difficulty in walking, Not elsewhere classified (R26.2)  · Hemiplegia and hemiparesis following cerebral infarction affecting left non-dominant side (J49.569)   Precaution/Allergies:  Patient has no known allergies. ASSESSMENT:     Mr. Henriquez is a 53year old admitted R MCA CVA. Patient seen this PM for physical therapy re-evaluation to address plan of care and progression toward goals. Patient with prolonged hospital stay and has made slow, steady progress toward goals with advancements at each re-assessment. L LE strength and gait mechanics improved overall. Today, hip flexion 3-/5, L LE quad strength 3-/5, hip adduction 3+/5, adduction 3/5, plantarflexion 3-/5, and dorsiflexion 1/5. Improvements noted in quad strength since last re-assessment. Today, patient required supervision with bed mobility and 0 verbal cues. Required CGA with transfers and CGA with ambulation with gait belt, hemiwalker, and L LE AFO. Addressed goal #8 with patient instructing therapist of donning/doffing AFO with teach back method. Post ambulation addressed transfer mechanics and strategies transferring to strong and weak side from bed to wheelchair. Also addressed static and dynamic standing balance with reaching outside of base of support. Reviewed wheelchair locking and safety. Good participation today, patient was engaged and more verbal compared to previous treatment session where was more withdrawn. Set goal with patient to progress/reduce level of assistance with transfers bed to chair. Plan of care updated to reflect new goal for out of bed tolerance in chair. I feel we need to address his trunk and core strength as well and promote anterior pelvic tilt to help with sitting balance. Patient is getting weak in these areas from spending prolonged periods in supine.  Recommend inpatient rehab placement to optimize functional independence; discharge challenges appreciated. Will continue to follow with updated plan of care. This section established at most recent assessment   PROBLEM LIST (Impairments causing functional limitations):  1. Decreased Strength  2. Decreased ADL/Functional Activities  3. Decreased Transfer Abilities  4. Decreased Ambulation Ability/Technique  5. Decreased Balance  6. Increased Pain  7. Decreased Activity Tolerance  8. Increased Fatigue  9. Decreased Flexibility/Joint Mobility   INTERVENTIONS PLANNED: (Benefits and precautions of physical therapy have been discussed with the patient.)  1. Balance Exercise  2. Bed Mobility  3. Family Education  4. Gait Training  5. Home Exercise Program (HEP)  6. Manual Therapy  7. Neuromuscular Re-education/Strengthening  8. Range of Motion (ROM)  9. Therapeutic Activites  10. Therapeutic Exercise/Strengthening  11. Transfer Training     TREATMENT PLAN: Frequency/Duration: 5 times a week for duration of hospital stay  Rehabilitation Potential For Stated Goals: Good     REHAB RECOMMENDATIONS (at time of discharge pending progress):    Placement: It is my opinion, based on this patient's performance to date, that Mr. Luc Gómez may benefit from intensive therapy at an [address] [place] [address] after discharge due to a probable need for close medical supervision by a rehab physician, a probable need for multiple therapy disciplines and potential to make ongoing and sustainable functional improvement that is of practical value. .  Equipment:    TBD pending progress with therapy. HISTORY:   History of Present Injury/Illness (Reason for Referral):  Per H&P: \"Pt is a 54 y/o smoker with DM, HTN, who presented to ER with L leg and arm weakness, L facial droop, dysarthria. First noted L leg weakness late night 12/11 when he woke up to go to the bathroom. He was normal when he went to bed around 1030. Woke up this morning and had persistent weakness L leg and also now noted in L arm. EMS called. Noted to have slurred speech and L facial droop as well. Code S called in ER around 9am.  MRI with acute infarcts in L cerebellar hemisphere, deep frontoparietal white matter, and R paramedian yariel. Also noted old lacunar infarcts. No large vessel occlusion on CTA, but some stenosis noted. No hx afib, TIA, CVA. No CP, palpitations, SOB. \"  Past Medical History/Comorbidities:   Mr. Jonathon Smith  has a past medical history of Acute ischemic stroke ([address] (12/12/2019), Acute pancreatitis (11/19/2014), Cerebrovascular accident (CVA) due to embolism ([address] (12/12/2019), Diabetes ([address] (2002), Diabetes ([address] Diabetes mellitus, and Hypertension. He also has no past medical history of Arthritis, Asthma, Autoimmune disease ([address] CAD (coronary artery disease), Cancer ([address] Chronic kidney disease, COPD, Dementia, Dementia ([address] Heart failure ([address] Ill-defined condition, Infectious disease, Liver disease, Other ill-defined conditions(799.89), Psychiatric disorder, PUD (peptic ulcer disease), Seizures ([address] or Sleep disorder. Mr. Jonathon Smith  has a past surgical history that includes hx hernia repair and hx orthopaedic. Social History/Living Environment:   Home Environment: Apartment  # Steps to Enter: 12  Rails to Enter: Yes  [employer] : Bilateral  One/Two Story Residence: One story  Living Alone: No  Support Systems: Spouse/Significant Other/Partner  Patient Expects to be Discharged to[de-identified] Unknown  Current DME Used/Available at Home: None  Tub or Shower Type: Tub/Shower combination  Prior Level of Function/Work/Activity:  Independent, lives with wife in 2nd story 1 level apartment. No recent falls.    Number of Personal Factors/Comorbidities that affect the Plan of Care: 1-2: MODERATE COMPLEXITY   EXAMINATION:   Most Recent Physical Functioning:   Gross Assessment: left hip grossly 2/5 to 3-/5  AROM: Generally decreased, functional(LUE/LE)  PROM: Within functional limits  Strength: Generally decreased, functional(LUE/LLE)  Coordination: Generally decreased, functional(L LE)  Tone: Abnormal(L UE; L LE)  Sensation: Intact(Light touch B LE)                    Balance:  Sitting: Impaired  Sitting - Static: Good (unsupported)  Sitting - Dynamic: Fair (occasional)(+)  Standing: Impaired  Standing - Static: Good  Standing - Dynamic : Fair Bed Mobility:  Supine to Sit: Supervision  Scooting: Supervision; Additional time  Interventions: Tactile cues; Safety awareness training;Verbal cues  Wheelchair Mobility: NT     Transfers:  Sit to Stand: Contact guard assistance  Stand to Sit: Stand-by assistance  Bed to Chair: Contact guard assistance  Gait:     Base of Support: Center of gravity altered  Speed/Clotilde: Slow  Gait Abnormalities: Hemiplegic  Distance (ft): (300)  Assistive Device: Walker luke;Gait belt  Ambulation - Level of Assistance: Contact guard assistance;Stand-by assistance  Interventions: Safety awareness training; Tactile cues; Verbal cues      Body Structures Involved:  1. Nerves  2. Voice/Speech  3. Bones  4. Joints  5. Muscles Body Functions Affected:  1. Mental  2. Sensory/Pain  3. Neuromusculoskeletal  4. Movement Related Activities and Participation Affected:  1. General Tasks and Demands  2. Mobility  3. Self Care  4. Interpersonal Interactions and Relationships   Number of elements that affect the Plan of Care: 4+: HIGH COMPLEXITY   CLINICAL PRESENTATION:   Presentation: Evolving clinical presentation with changing clinical characteristics: MODERATE COMPLEXITY   CLINICAL DECISION MAKIN [facility] Mobility Inpatient Short Form  How much difficulty does the patient currently have. .. Unable A Lot A Little None   1. Turning over in bed (including adjusting bedclothes, sheets and blankets)? [] 1   [] 2   [] 3   [x] 4   2. Sitting down on and standing up from a chair with arms ( e.g., wheelchair, bedside commode, etc.)   [] 1   [] 2   [x] 3   [] 4   3.   Moving from lying on back to sitting on the side of the bed? [] 1   [] 2   [] 3   [x] 4   How much help from another person does the patient currently need. .. Total A Lot A Little None   4. Moving to and from a bed to a chair (including a wheelchair)? [] 1   [] 2   [x] 3   [] 4   5. Need to walk in hospital room? [] 1   [] 2   [x] 3   [] 4   6. Climbing 3-5 steps with a railing? [] 1   [x] 2   [] 3   [] 4   © 2007, Trustees of [address] [place] [address] [address] [id number], under license to [employer]. All rights reserved      Score:  Initial: 13 Most Recent: 19 (Date: 5/21/2020)    Interpretation of Tool:  Represents activities that are increasingly more difficult (i.e. Bed mobility, Transfers, Gait). Medical Necessity:     · Patient is expected to demonstrate progress in   · strength, range of motion, balance, coordination, and functional technique  ·  to   · increase independence with all mobility. · .  Reason for Services/Other Comments:  · Patient continues to require skilled intervention due to   · medical complications and mobility deficits which impact his level of function, safety, and independence as indicated above. · .   Use of outcome tool(s) and clinical judgement create a POC that gives a: Questionable prediction of patient's progress: MODERATE COMPLEXITY        TREATMENT:      Pre-treatment Symptoms/Complaints: see above  Pain: Initial: 0/10 Post Session:  0/10     Physical Therapy Re-evaluation (untimed charge)  Therapeutic Activity: (    28 Minutes): Therapeutic activities including bed mobility training, transfer training from various surface heights, ambulation on level ground, static/dynamic standing balance, posture training, instruction in sequencing with luke walker and gait mechanics, doning and doffing AFO instruction with teach back method and patient education to improve mobility, strength and balance. Required moderate Safety awareness training; Tactile cues; Verbal cues to promote static and dynamic balance in standing, promote coordination of bilateral, lower extremity(s) and promote motor control of bilateral, lower extremity(s). Braces/Orthotics/Lines/Etc:   · L LE AFO  Treatment/Session Assessment:    · Response to Treatment:  See above  · Interdisciplinary Collaboration:   o Physical Therapist  o Registered Nurse  o Rehabilitation Attendant  · After treatment position/precautions:   o Up in [facility] with ambulation team; needs met; patient in NAD   · Compliance with Program/Exercises: Compliant all of the time  · Recommendations/Intent for next treatment session: \"Next visit will focus on advancements to more challenging activities and reduction in assistance provided\".     Total Treatment Duration:  PT Patient Time In/Time Out  Time In: 1328  Time Out: [address], [place]

## 2020-05-21 NOTE — PROGRESS NOTES
Hospitalist Progress Note    Subjective:   Daily Progress Note: 5/21/2020 9:00 AM    Patient admitted 12/12/19 with acute right side embolic stroke with left side residual weakness and left facial droop. CT brain on admission with indeterminate infarctions within the left corona radiata/caudate.  CTA of the head and neck with stenosis at the distal segment of the right  Vertebral artery and multifocal stenosis in the P2 segment of the left posterior cerebral artery.  MRI brain with acute to early subacute infarcts in the left cerebellar hemisphere in the periventricular deep left frontoparietal white matter and likely additional areas of restricted diffusion in the right paramedian yariel. Echo with + PFO, on statin and ASA.  Will need event monitor on discharge and if no arrhythmia, PFO closure recommended.  Wife informs CM she does not want patient at home, they were planning to separate prior to this stroke.  CM attempting  to place in STR, medicaid pending.    4/9:  Speech remains slightly slurred, SLP signing off as clinical indication no longer needed.    4/10: Mag critically low today: 1.3. Replaced   4/14:  Continues to wait for placement.  Good participation with PT/OT. Romaine Schooling a little use of right hand, still unable to .    5/2:  Fitted for AFO today per orthotist.  Continued wait for medicaid and SSI approval.  Metformin dosing adjusted a few days ago.  Glucose 118-126 x 24 hours.  CM spoke with wife, still does not want patient to come home, even if he has to go to a shelter.    5/11: [facility] waiting for disability rating, Medicaid. . Dietician in.  Participating with PT/OT, ambulated total of 400 feet.    5/20:  Conversant today. In good spirits despite home and marital situation.       5/21:  OOB most of day. Core weak per PT, needs continued strength and balance training. Up in garett much of day today.        Current Facility-Administered Medications   Medication Dose Route Frequency    magnesium oxide (MAG-OX) tablet 400 mg  400 mg Oral BID    metFORMIN (GLUCOPHAGE) tablet 500 mg  500 mg Oral BID WITH MEALS    acetaminophen (TYLENOL) tablet 650 mg  650 mg Oral Q4H PRN    diphenhydrAMINE (BENADRYL) capsule 25 mg  25 mg Oral Q6H PRN    acetaminophen (TYLENOL) tablet 650 mg  650 mg Oral Q8H    lip protectant (BLISTEX) ointment 1 Each  1 Each Topical PRN    metoprolol succinate (TOPROL-XL) XL tablet 25 mg  25 mg Oral DAILY    polyethylene glycol (MIRALAX) packet 17 g  17 g Oral DAILY PRN    guaiFENesin (ROBITUSSIN) 100 mg/5 mL oral liquid 100 mg  100 mg Oral Q4H PRN    hydrALAZINE (APRESOLINE) 20 mg/mL injection 20 mg  20 mg IntraVENous Q4H PRN    atorvastatin (LIPITOR) tablet 80 mg  80 mg Oral QHS    lisinopril (PRINIVIL, ZESTRIL) tablet 40 mg  40 mg Oral DAILY    sodium chloride (NS) flush 5-40 mL  5-40 mL IntraVENous PRN    ondansetron (ZOFRAN) injection 4 mg  4 mg IntraVENous Q4H PRN    aspirin chewable tablet 81 mg  81 mg Oral DAILY    enoxaparin (LOVENOX) injection 40 mg  40 mg SubCUTAneous Q24H    dextrose 40% (GLUTOSE) oral gel 1 Tube  15 g Oral PRN    glucagon (GLUCAGEN) injection 1 mg  1 mg IntraMUSCular PRN    dextrose (D50W) injection syrg 12.5-25 g  25-50 mL IntraVENous PRN      Review of Systems  A comprehensive review of systems was negative except for that written in the HPI. Objective:     Visit Vitals  /80 (BP 1 Location: Right arm, BP Patient Position: At rest)   Pulse 77   Temp 97.8 °F (36.6 °C)   Resp 17   Ht 5' 6\" (1.676 m)   Wt 79.8 kg (175 lb 14.4 oz)   SpO2 97%   BMI 28.39 kg/m²      O2 Device: Room air    Temp (24hrs), Av.9 °F (36.6 °C), Min:97.8 °F (36.6 °C), Max:98.1 °F (36.7 °C)    General appearance: Conversant, oriented. Denies need or new issues.    Head: Normocephalic, without obvious abnormality, atraumatic  Eyes: conjunctivae/corneas clear.  PERRL  Throat: Continued mild left facial weakness.  Lips, mucosa, and tongue normal. Teeth and gums normal.  Eating normally. Neck: supple, symmetrical, trachea midline, no JVD  Lungs: clear to auscultation bilaterally  Heart: regular rate and rhythm, S1, S2 normal, no murmur, click, rub or gallop  Abdomen: soft, non-tender. Bowel sounds normal. No masses,  no organomegaly  Extremities: Persistent left side upper and lower residual weakness, improved since admission.  Doing well with right hand only. Skin: Skin color, texture, turgor normal. No rashes or lesions  Neurologic: As above.     Additional comments: Notes,orders, test results, vitals reviewed     Data Review   Ref.  Range 5/13/2020 05:29   WBC Latest Ref Range: 4.3 - 11.1 K/uL 5.6   RBC Latest Ref Range: 4.23 - 5.6 M/uL 4.17 (L)   HGB Latest Ref Range: 13.6 - 17.2 g/dL 11.2 (L)   HCT Latest Ref Range: 41.1 - 50.3 % 35.0 (L)   MCV Latest Ref Range: 79.6 - 97.8 FL 83.9   MCH Latest Ref Range: 26.1 - 32.9 PG 26.9   MCHC Latest Ref Range: 31.4 - 35.0 g/dL 32.0   RDW Latest Ref Range: 11.9 - 14.6 % 16.0 (H)   PLATELET Latest Ref Range: 150 - 450 K/uL 181   MPV Latest Ref Range: 9.4 - 12.3 FL 11.6   ABSOLUTE NRBC Latest Ref Range: 0.0 - 0.2 K/uL 0.00   Sodium Latest Ref Range: 136 - 145 mmol/L 140   Potassium Latest Ref Range: 3.5 - 5.1 mmol/L 4.2   Chloride Latest Ref Range: 98 - 107 mmol/L 107   CO2 Latest Ref Range: 21 - 32 mmol/L 26   Anion gap Latest Ref Range: 7 - 16 mmol/L 7   Glucose Latest Ref Range: 65 - 100 mg/dL 99   BUN Latest Ref Range: 6 - 23 MG/DL 20   Creatinine Latest Ref Range: 0.8 - 1.5 MG/DL 1.01   Calcium Latest Ref Range: 8.3 - 10.4 MG/DL 9.2   Magnesium Latest Ref Range: 1.8 - 2.4 mg/dL 1.7 (L)   GFR est non-AA Latest Ref Range: >60 ml/min/1.73m2 >60   GFR est AA Latest Ref Range: >60 ml/min/1.73m2      Assessment/Plan:   Acute to early subacute infarcts in the left cerebellar hemisphere, in the periventricular deep left frontoparietal white matter and likely additional areas of restricted diffusion in the right paramedian yariel.  Old lacunar infarcts also.                  Making good progress with PT/OT                Speech and swallowing improved to the extent    SLP signed off                 Pending placement after approved for SSI and  Medicaid     Dysarthria due to stroke:  Improved immensely, able to understand all speech.               SLP releasing 4/14      Difficult placement with marital discord prior to stroke:  Wife does not want him at home:  CM working on tirelessly              Now issues obtaining MR for [employer], needs SSI              CM spoke with wife 5/1, she still does not want patient to come  home. Patient spoke with her again 5/19, does not even know where she lives     Hypertension:  Continue lisinopril, toprol XL     IDDM II:  Continue glucophage, diabetic diet.  A1C: 8.4, rechecking               adding SSI 4/15              DM education and  management continues to follow intermittently,  increased glucophage frequency 4/30              Discontinue SSI      Arrhythmia:  On beta blocker     PFO 12/17/2019:                Will need 4 week event monitor on  discharge               Will need PFO closure at some point after discharge per Cards      Hypomagnesemia:  Replace and recheck               Increasing daily 400 mg dose to bid 5/2.             monitor weekly      Care Plan discussed with: Patient and Nurse    Signed By: Samira Harris NP     May 21, 2020

## 2020-05-22 PROCEDURE — 74011250637 HC RX REV CODE- 250/637: Performed by: INTERNAL MEDICINE

## 2020-05-22 PROCEDURE — 65270000029 HC RM PRIVATE

## 2020-05-22 PROCEDURE — 74011250637 HC RX REV CODE- 250/637: Performed by: NURSE PRACTITIONER

## 2020-05-22 PROCEDURE — 97530 THERAPEUTIC ACTIVITIES: CPT

## 2020-05-22 PROCEDURE — 74011250637 HC RX REV CODE- 250/637: Performed by: HOSPITALIST

## 2020-05-22 PROCEDURE — 74011250636 HC RX REV CODE- 250/636: Performed by: INTERNAL MEDICINE

## 2020-05-22 RX ADMIN — ACETAMINOPHEN 650 MG: 325 TABLET, FILM COATED ORAL at 06:05

## 2020-05-22 RX ADMIN — ATORVASTATIN CALCIUM 80 MG: 80 TABLET, FILM COATED ORAL at 21:52

## 2020-05-22 RX ADMIN — Medication 400 MG: at 09:12

## 2020-05-22 RX ADMIN — METOPROLOL SUCCINATE 25 MG: 25 TABLET, FILM COATED, EXTENDED RELEASE ORAL at 09:12

## 2020-05-22 RX ADMIN — ACETAMINOPHEN 650 MG: 325 TABLET, FILM COATED ORAL at 13:12

## 2020-05-22 RX ADMIN — LISINOPRIL 40 MG: 20 TABLET ORAL at 09:12

## 2020-05-22 RX ADMIN — Medication 400 MG: at 17:08

## 2020-05-22 RX ADMIN — METFORMIN HYDROCHLORIDE 500 MG: 500 TABLET ORAL at 17:08

## 2020-05-22 RX ADMIN — ENOXAPARIN SODIUM 40 MG: 40 INJECTION SUBCUTANEOUS at 13:11

## 2020-05-22 RX ADMIN — ASPIRIN 81 MG 81 MG: 81 TABLET ORAL at 09:12

## 2020-05-22 RX ADMIN — METFORMIN HYDROCHLORIDE 500 MG: 500 TABLET ORAL at 09:12

## 2020-05-22 RX ADMIN — DIPHENHYDRAMINE HYDROCHLORIDE 25 MG: 25 CAPSULE ORAL at 17:08

## 2020-05-22 RX ADMIN — GUAIFENESIN 100 MG: 100 SOLUTION ORAL at 17:08

## 2020-05-22 RX ADMIN — ACETAMINOPHEN 650 MG: 325 TABLET, FILM COATED ORAL at 21:52

## 2020-05-22 NOTE — PROGRESS NOTES
Problem: Mobility Impaired (Adult and Pediatric)  Goal: *Acute Goals and Plan of Care  Description  GOALS REVIEWED ON 5/21/2020 (BOLD INDICATES MOST RECENTLY UPDATED GOAL):  1. Patient will perform bed mobility with MODIFIED INDEPENDENCE and 0 verbal cues within 7 treatment days. PROGRESSING; CURRENTLY AT SUPERVISION  2. Patient will perform transfer bed to chair/wheelchair with SUPERVISION within 7 treatment days. PROGRESSING; CURRENTLY AT CGA  3. Patient will demonstrate FAIR+ dynamic balance throughout ambulation within 7 treatment days. PROGRESSING; CURRENTLY AT FAIR  4. Patient demonstrate 3+/5 strength in L LE hip flexion, hip abduction, hip adduction, and knee extension (quad strength) within 7 treatment days. PROGRESSING SEE STRENGTH ASSESSMENT BELOW  5. Patient will ambulate 300ft+ with STAND BY ASSIST and least retrictive assistive device and L LE AFO within 7 treatment days. PROGRESSING CURRENTLY AT CGA FOR 300FT  6. Patient will perform wheelchair mobility 75ft with SUPERVISION and 0 verbal cues within 7 treatment days. PROGRESSING CURRENTLY REQUIRING CGA FOR 50 FT  7. Patient will don/doff LUE sling for protection of L shoulder during transfers/gait with set up within 7 treatment days. PROGRESSING REQUIRING MIN-MODERATE ASSISTANCE  8. Patient will instruct caregiver how to don/doff L LE AFO with independence within 7 treatment days. 9. Patient will tolerate out of bed mobility 4 hours daily to address dynamic sitting balance, promote anterior pelvic tilt, and trunk/core strengthening within 7 treatment days.         PHYSICAL THERAPY: Daily Note and PM 5/22/2020  INPATIENT: PT Visit Days : 2  Payor: [id number] [address] / Plan: [address] [place] [address] Avenue / Product Type: Medicaid /       NAME/AGE/GENDER: Sandra Allen is a 53 y.o. male   PRIMARY DIAGNOSIS: Acute ischemic stroke ([address] [I63.9]  Cerebrovascular accident (CVA) due to embolism ([address] [V33.1] Acute embolic stroke ([address] Acute embolic stroke [facility])       ICD-10: Treatment Diagnosis:   · Difficulty in walking, Not elsewhere classified (R26.2)  · Hemiplegia and hemiparesis following cerebral infarction affecting left non-dominant side (U75.057)   Precaution/Allergies:  Patient has no known allergies. ASSESSMENT:     Mr. Henriquez is a 53year old admitted R MCA CVA. Patient was supine upon contact and agreeable to PT. Patient performs supine to sit with supervision. Sits EOB with good static sitting balance while therapist donned socks, brace, and shoes with total assist. Patient agreeable to attempting ambulation on unlevel surfaces outside. Patient transfers to wheelchair with SBA where he was rolled outside to ambulate on sidewalk. Patient ambulated 300' on sidewalk outside encountering debris (sticks, leaves, trash, etc), slight incline/decline, and 3-4in curbs. Patient needed increased cues for safe navigation around objects but did not demonstrate any LOB during tasks. Patient seemed to enjoy a change of scenery and the fresh air during treatment. Patient returned to his room where he transfers back to bed with SBA and to supine with supervision. Overall slow progress towards physical therapy goals. Patient's goals listed above are still appropriate. Will continue skilled PT to address remaining deficits. This section established at most recent assessment   PROBLEM LIST (Impairments causing functional limitations):  1. Decreased Strength  2. Decreased ADL/Functional Activities  3. Decreased Transfer Abilities  4. Decreased Ambulation Ability/Technique  5. Decreased Balance  6. Increased Pain  7. Decreased Activity Tolerance  8. Increased Fatigue  9. Decreased Flexibility/Joint Mobility   INTERVENTIONS PLANNED: (Benefits and precautions of physical therapy have been discussed with the patient.)  1. Balance Exercise  2. Bed Mobility  3. Family Education  4. Gait Training  5. Home Exercise Program (HEP)  6. Manual Therapy  7.  Neuromuscular Re-education/Strengthening  8. Range of Motion (ROM)  9. Therapeutic Activites  10. Therapeutic Exercise/Strengthening  11. Transfer Training     TREATMENT PLAN: Frequency/Duration: 5 times a week for duration of hospital stay  Rehabilitation Potential For Stated Goals: Good     REHAB RECOMMENDATIONS (at time of discharge pending progress):    Placement: It is my opinion, based on this patient's performance to date, that Mr. Sneha Munguia may benefit from intensive therapy at an [address] [place] [address] after discharge due to a probable need for close medical supervision by a rehab physician, a probable need for multiple therapy disciplines and potential to make ongoing and sustainable functional improvement that is of practical value. .  Equipment:    TBD pending progress with therapy. HISTORY:   History of Present Injury/Illness (Reason for Referral):  Per H&P: \"Pt is a 54 y/o smoker with DM, HTN, who presented to ER with L leg and arm weakness, L facial droop, dysarthria. First noted L leg weakness late night 12/11 when he woke up to go to the bathroom. He was normal when he went to bed around 1030. Woke up this morning and had persistent weakness L leg and also now noted in L arm. EMS called. Noted to have slurred speech and L facial droop as well. Code S called in ER around 9am.  MRI with acute infarcts in L cerebellar hemisphere, deep frontoparietal white matter, and R paramedian yariel. Also noted old lacunar infarcts. No large vessel occlusion on CTA, but some stenosis noted. No hx afib, TIA, CVA. No CP, palpitations, SOB. \"  Past Medical History/Comorbidities:   Mr. Sneha Munguia  has a past medical history of Acute ischemic stroke ([address] (12/12/2019), Acute pancreatitis (11/19/2014), Cerebrovascular accident (CVA) due to embolism ([address] (12/12/2019), Diabetes ([address] (2002), Diabetes ([address] Diabetes mellitus, and Hypertension.  He also has no past medical history of Arthritis, Asthma, Autoimmune disease ([facility] [address] CAD (coronary artery disease), Cancer ([facility] [address] Chronic kidney disease, COPD, Dementia, Dementia ([facility] [address] Heart failure ([facility] [address] Ill-defined condition, Infectious disease, Liver disease, Other ill-defined conditions(799.89), Psychiatric disorder, PUD (peptic ulcer disease), Seizures ([facility] [address] or Sleep disorder. Mr. Breanna Colbert  has a past surgical history that includes hx hernia repair and hx orthopaedic. Social History/Living Environment:   Home Environment: Apartment  # Steps to Enter: 12  Rails to Enter: Yes  [employer] : Bilateral  One/Two Story Residence: One story  Living Alone: No  Support Systems: Spouse/Significant Other/Partner  Patient Expects to be Discharged to[de-identified] Unknown  Current DME Used/Available at Home: None  Tub or Shower Type: Tub/Shower combination  Prior Level of Function/Work/Activity:  Independent, lives with wife in 2nd story 1 level apartment. No recent falls. Number of Personal Factors/Comorbidities that affect the Plan of Care: 1-2: MODERATE COMPLEXITY   EXAMINATION:   Most Recent Physical Functioning:   Gross Assessment: left hip grossly 2/5 to 3-/5                       Balance:  Sitting: Impaired  Sitting - Static: Good (unsupported)  Sitting - Dynamic: Fair (occasional)  Standing: Impaired  Standing - Static: Good  Standing - Dynamic : Fair Bed Mobility:  Supine to Sit: Supervision  Wheelchair Mobility: NT     Transfers:  Sit to Stand: Contact guard assistance  Stand to Sit: Contact guard assistance  Gait:     Base of Support: Center of gravity altered  Speed/Clotilde: Slow  Gait Abnormalities: Hemiplegic  Distance (ft): 300 Feet (ft)  [place] - Level of Assistance: Contact guard assistance  Ambulation - Level of Assistance: Contact guard assistance;Stand-by assistance  Interventions: Safety awareness training; Tactile cues; Verbal cues      Body Structures Involved:  1. Nerves  2. Voice/Speech  3. Bones  4. Joints  5.  Muscles Body Functions Affected:  1. Mental  2. Sensory/Pain  3. Neuromusculoskeletal  4. Movement Related Activities and Participation Affected:  1. General Tasks and Demands  2. Mobility  3. Self Care  4. Interpersonal Interactions and Relationships   Number of elements that affect the Plan of Care: 4+: HIGH COMPLEXITY   CLINICAL PRESENTATION:   Presentation: Evolving clinical presentation with changing clinical characteristics: MODERATE COMPLEXITY   CLINICAL DECISION MAKIN [facility] Mobility Inpatient Short Form  How much difficulty does the patient currently have. .. Unable A Lot A Little None   1. Turning over in bed (including adjusting bedclothes, sheets and blankets)? [] 1   [] 2   [] 3   [x] 4   2. Sitting down on and standing up from a chair with arms ( e.g., wheelchair, bedside commode, etc.)   [] 1   [] 2   [x] 3   [] 4   3. Moving from lying on back to sitting on the side of the bed? [] 1   [] 2   [] 3   [x] 4   How much help from another person does the patient currently need. .. Total A Lot A Little None   4. Moving to and from a bed to a chair (including a wheelchair)? [] 1   [] 2   [x] 3   [] 4   5. Need to walk in hospital room? [] 1   [] 2   [x] 3   [] 4   6. Climbing 3-5 steps with a railing? [] 1   [x] 2   [] 3   [] 4   © , Trustees of [address] [place] [address], under license to [employer]. All rights reserved      Score:  Initial: 13 Most Recent: 19 (Date: 2020)    Interpretation of Tool:  Represents activities that are increasingly more difficult (i.e. Bed mobility, Transfers, Gait). Medical Necessity:     · Patient is expected to demonstrate progress in   · strength, range of motion, balance, coordination, and functional technique  ·  to   · increase independence with all mobility.    · .  Reason for Services/Other Comments:  · Patient continues to require skilled intervention due to   · medical complications and mobility deficits which impact his level of function, safety, and independence as indicated above. · .   Use of outcome tool(s) and clinical judgement create a POC that gives a: Questionable prediction of patient's progress: MODERATE COMPLEXITY        TREATMENT:      Pre-treatment Symptoms/Complaints: see above  Pain: Initial: 0/10 Post Session:  0/10       Therapeutic Activity: (    30 Minutes): Therapeutic activities including bed mobility training, transfer training from various surface heights, ambulation on uneven ground/surfaces including incline/decline and up and down 3-4in curbs, static/dynamic standing balance, posture training, instruction in sequencing with luke walker and gait mechanics, and patient education to improve mobility, strength and balance. Required moderate Safety awareness training; Tactile cues; Verbal cues to promote static and dynamic balance in standing, promote coordination of bilateral, lower extremity(s) and promote motor control of bilateral, lower extremity(s). Braces/Orthotics/Lines/Etc:   · L LE AFO  Treatment/Session Assessment:    · Response to Treatment:  See above  · Interdisciplinary Collaboration:   o Physical Therapy Assistant  o Registered Nurse  o Rehabilitation Attendant  · After treatment position/precautions:   o Supine in bed  o Bed alarm/tab alert on  o Bed/Chair-wheels locked  o Bed in low position  o Call light within reach  o RN notified   · Compliance with Program/Exercises: Compliant all of the time  · Recommendations/Intent for next treatment session: \"Next visit will focus on advancements to more challenging activities and reduction in assistance provided\".     Total Treatment Duration:  PT Patient Time In/Time Out  Time In: 4535  Time Out: Agnieszka Mg PTA

## 2020-05-22 NOTE — PROGRESS NOTES
Problem: Pressure Injury - Risk of  Goal: *Prevention of pressure injury  Description: Document Dewey Scale and appropriate interventions in the flowsheet. Outcome: Progressing Towards Goal  Note: Pressure Injury Interventions:  Sensory Interventions: Assess changes in LOC, Assess need for specialty bed    Moisture Interventions: Assess need for specialty bed, Apply protective barrier, creams and emollients, Absorbent underpads    Activity Interventions: PT/OT evaluation, Pressure redistribution bed/mattress(bed type), Increase time out of bed    Mobility Interventions: PT/OT evaluation, Pressure redistribution bed/mattress (bed type), HOB 30 degrees or less    Nutrition Interventions: Offer support with meals,snacks and hydration    Friction and Shear Interventions: HOB 30 degrees or less                Problem: Falls - Risk of  Goal: *Absence of Falls  Description: Document Jeanne Fall Risk and appropriate interventions in the flowsheet.   Outcome: Progressing Towards Goal  Note: Fall Risk Interventions:  Mobility Interventions: Assess mobility with egress test, Bed/chair exit alarm, Communicate number of staff needed for ambulation/transfer    Mentation Interventions: Bed/chair exit alarm, Adequate sleep, hydration, pain control    Medication Interventions: Evaluate medications/consider consulting pharmacy, Bed/chair exit alarm, Assess postural VS orthostatic hypotension    Elimination Interventions: Patient to call for help with toileting needs, Elevated toilet seat, Call light in reach, Urinal in reach, Toileting schedule/hourly rounds    History of Falls Interventions: Door open when patient unattended, Consult care management for discharge planning, Bed/chair exit alarm, Investigate reason for fall, Evaluate medications/consider consulting pharmacy         Problem: Patient Education: Go to Patient Education Activity  Goal: Patient/Family Education  Outcome: Progressing Towards Goal     Problem: Falls - Risk of  Goal: *Absence of Falls  Description: Document Arian Dixon Fall Risk and appropriate interventions in the flowsheet.   Outcome: Progressing Towards Goal  Note: Fall Risk Interventions:  Mobility Interventions: Assess mobility with egress test, Bed/chair exit alarm, Communicate number of staff needed for ambulation/transfer    Mentation Interventions: Bed/chair exit alarm, Adequate sleep, hydration, pain control    Medication Interventions: Evaluate medications/consider consulting pharmacy, Bed/chair exit alarm, Assess postural VS orthostatic hypotension    Elimination Interventions: Patient to call for help with toileting needs, Elevated toilet seat, Call light in reach, Urinal in reach, Toileting schedule/hourly rounds    History of Falls Interventions: Door open when patient unattended, Consult care management for discharge planning, Bed/chair exit alarm, Investigate reason for fall, Evaluate medications/consider consulting pharmacy

## 2020-05-23 PROCEDURE — 74011250637 HC RX REV CODE- 250/637: Performed by: INTERNAL MEDICINE

## 2020-05-23 PROCEDURE — 74011250637 HC RX REV CODE- 250/637: Performed by: HOSPITALIST

## 2020-05-23 PROCEDURE — 74011250637 HC RX REV CODE- 250/637: Performed by: NURSE PRACTITIONER

## 2020-05-23 PROCEDURE — 65270000029 HC RM PRIVATE

## 2020-05-23 PROCEDURE — 74011250636 HC RX REV CODE- 250/636: Performed by: INTERNAL MEDICINE

## 2020-05-23 PROCEDURE — 97535 SELF CARE MNGMENT TRAINING: CPT

## 2020-05-23 RX ADMIN — ACETAMINOPHEN 650 MG: 325 TABLET, FILM COATED ORAL at 14:28

## 2020-05-23 RX ADMIN — DIPHENHYDRAMINE HYDROCHLORIDE 25 MG: 25 CAPSULE ORAL at 17:28

## 2020-05-23 RX ADMIN — METFORMIN HYDROCHLORIDE 500 MG: 500 TABLET ORAL at 08:28

## 2020-05-23 RX ADMIN — ENOXAPARIN SODIUM 40 MG: 40 INJECTION SUBCUTANEOUS at 15:29

## 2020-05-23 RX ADMIN — ACETAMINOPHEN 650 MG: 325 TABLET, FILM COATED ORAL at 21:50

## 2020-05-23 RX ADMIN — METOPROLOL SUCCINATE 25 MG: 25 TABLET, FILM COATED, EXTENDED RELEASE ORAL at 08:28

## 2020-05-23 RX ADMIN — GUAIFENESIN 100 MG: 100 SOLUTION ORAL at 17:28

## 2020-05-23 RX ADMIN — ASPIRIN 81 MG 81 MG: 81 TABLET ORAL at 08:28

## 2020-05-23 RX ADMIN — Medication 400 MG: at 17:29

## 2020-05-23 RX ADMIN — LISINOPRIL 40 MG: 20 TABLET ORAL at 08:28

## 2020-05-23 RX ADMIN — ACETAMINOPHEN 650 MG: 325 TABLET, FILM COATED ORAL at 05:21

## 2020-05-23 RX ADMIN — ATORVASTATIN CALCIUM 80 MG: 80 TABLET, FILM COATED ORAL at 21:50

## 2020-05-23 RX ADMIN — METFORMIN HYDROCHLORIDE 500 MG: 500 TABLET ORAL at 17:29

## 2020-05-23 RX ADMIN — Medication 400 MG: at 08:28

## 2020-05-23 NOTE — PROGRESS NOTES
Hospitalist Progress Note     Admit Date:  2019  9:07 AM   Name:  Tammy Montes   Age:  53 y.o.  :  1975   MRN:  [id number]   PCP:  Jordin Phillips MD  Treatment Team: Attending Provider: Olivia Valdes MD; Care Manager: Lamar Huitron LMSW; Utilization Review: Saba Hardin RN; Nurse Practitioner: Beverly Estes NP; Hospitalist: Dakotah Langley NP; Utilization Review: Elizabeth Shipley RN; Occupational Therapy Assistant: Felton Camargo    Subjective:   HPI and or CC:  52 yo AA male with PMH of DM, HTN admitted  for CVA affecting numerous areas of the brain. He was placed on ASA and statin. He has remained alert and oriented x3. Some movement to right side but unable to grasp with right hand. He is currently waiting on placement and this has remained difficult due to waiting on Medicaid. :  Alert and oriented x3. Voices no complaints today. Continues to wait for placement. Continues to wait on SSI and Medicaid approval.  He remains afebrile. Objective:     Patient Vitals for the past 24 hrs:   Temp Pulse Resp BP SpO2   20 0800 97.9 °F (36.6 °C) 73 17 149/71 99 %   20 0400 98.8 °F (37.1 °C) 83 16 129/89 98 %   20 0000 97.7 °F (36.5 °C) 76 16 116/75 97 %   20 2000 98.1 °F (36.7 °C) 76 16 121/81 96 %   20 1600 97.9 °F (36.6 °C) 75 18 112/76 99 %   20 1200 97.9 °F (36.6 °C) 73 18 153/90 97 %     Oxygen Therapy  O2 Sat (%): 99 % (20 0800)  Pulse via Oximetry: 75 beats per minute (20 0400)  O2 Device: Room air (20 1648)  No intake or output data in the 24 hours ending 20 1127      REVIEW OF SYSTEMS: Comprehensive ROS performed and negative except as stated in HPI. Physical Examination:  General:       No acute distress    Lungs:          CTA bilaterally. Resp even and nonlabored  Heart:            S1S2 present without murmurs rubs gallops. RRR.  No LE edema  Abdomen:    Soft, non tender, non distended. BS present  Extremities: No cyanosis. Left sided hemiparesis  Neurologic:  moves right hand and raises arm at elbow moderately, unable to squeeze my fingers left hand, mildly slurred speech.  PERRLA, strength 4/5 RUE/RLE. Data Review:  I have reviewed all labs, meds, telemetry events, and studies from the last 24 hours. No results found for this or any previous visit (from the past 24 hour(s)). All Micro Results     None          Current Meds:  Current Facility-Administered Medications   Medication Dose Route Frequency    magnesium oxide (MAG-OX) tablet 400 mg  400 mg Oral BID    metFORMIN (GLUCOPHAGE) tablet 500 mg  500 mg Oral BID WITH MEALS    acetaminophen (TYLENOL) tablet 650 mg  650 mg Oral Q4H PRN    diphenhydrAMINE (BENADRYL) capsule 25 mg  25 mg Oral Q6H PRN    acetaminophen (TYLENOL) tablet 650 mg  650 mg Oral Q8H    lip protectant (BLISTEX) ointment 1 Each  1 Each Topical PRN    metoprolol succinate (TOPROL-XL) XL tablet 25 mg  25 mg Oral DAILY    polyethylene glycol (MIRALAX) packet 17 g  17 g Oral DAILY PRN    guaiFENesin (ROBITUSSIN) 100 mg/5 mL oral liquid 100 mg  100 mg Oral Q4H PRN    hydrALAZINE (APRESOLINE) 20 mg/mL injection 20 mg  20 mg IntraVENous Q4H PRN    atorvastatin (LIPITOR) tablet 80 mg  80 mg Oral QHS    lisinopril (PRINIVIL, ZESTRIL) tablet 40 mg  40 mg Oral DAILY    sodium chloride (NS) flush 5-40 mL  5-40 mL IntraVENous PRN    ondansetron (ZOFRAN) injection 4 mg  4 mg IntraVENous Q4H PRN    aspirin chewable tablet 81 mg  81 mg Oral DAILY    enoxaparin (LOVENOX) injection 40 mg  40 mg SubCUTAneous Q24H    dextrose 40% (GLUTOSE) oral gel 1 Tube  15 g Oral PRN    glucagon (GLUCAGEN) injection 1 mg  1 mg IntraMUSCular PRN    dextrose (D50W) injection syrg 12.5-25 g  25-50 mL IntraVENous PRN       Diet:  DIET NUTRITIONAL SUPPLEMENTS  DIET DIABETIC CONSISTENT CARB    Other Studies (last 24 hours):  No results found.     Assessment and Plan: Hospital Problems as of 5/23/2020 Date Reviewed: 3/3/2017          Codes Class Noted - Resolved POA    Hypomagnesemia ICD-10-CM: E83.42  ICD-9-CM: 275.2  3/7/2020 - Present Unknown        PFO (patent foramen ovale) ICD-10-CM: Q21.1  ICD-9-CM: 745.5  12/17/2019 - Present Yes        Arrhythmia ICD-10-CM: I49.9  ICD-9-CM: 427.9  12/15/2019 - Present Yes        Hyperlipemia ICD-10-CM: E78.5  ICD-9-CM: 272.4  12/15/2019 - Present Yes        * (Principal) Acute embolic stroke ([facility] [address] SHG-41-CI: I63.9  ICD-9-CM: 434.11  12/12/2019 - Present Yes        Hypertension (Chronic) ICD-10-CM: I10  ICD-9-CM: 401.9  Unknown - Present Yes        Type 2 diabetes mellitus (HCC) (Chronic) ICD-10-CM: E11.9  ICD-9-CM: 250.00  Unknown - Present Yes              A/P:    Acute embolic stroke  MRI brain showed acute to early subacute infarcts in the left cerebellar hemisphere, in the periventricular deep left frontoparietal white matter and likely additional areas of restricted diffusion in the right paramedian yariel. +PFO on echo  On ASA, statin  Per PT recommendations, orthotist consult for AFO for L LE. Will need shunt closure once discharged-  4/30-I spoke with cardiology, they state PFO closure can wait until patient discharged to a facility.     Hypomagnesemia  Resolved       Hypertension  Continue metoprolol and ACE inhibitor  Goal BP <130/80     Type 2 diabetes mellitus:    Monitor, BGL controlled        Signed:  Mandy Osborn NP

## 2020-05-23 NOTE — PROGRESS NOTES
Hospitalist Progress Note    Subjective:   Daily Progress Note: 5/22/2020 8512    Patient admitted 12/12/19 with acute right side embolic stroke with left side residual weakness and left facial droop. CT brain on admission with indeterminate infarctions within the left corona radiata/caudate.  CTA of the head and neck with stenosis at the distal segment of the right  Vertebral artery and multifocal stenosis in the P2 segment of the left posterior cerebral artery.  MRI brain with acute to early subacute infarcts in the left cerebellar hemisphere in the periventricular deep left frontoparietal white matter and likely additional areas of restricted diffusion in the right paramedian yariel. Echo with + PFO, on statin and ASA.  Will need event monitor on discharge and if no arrhythmia, PFO closure recommended.  Wife informs CM she does not want patient at home, they were planning to separate prior to this stroke.  CM attempting  to place in STR, medicaid pending.    4/9:  Speech remains slightly slurred, SLP signing off as clinical indication no longer needed.    4/10: Mag critically low today: 1.3. Replaced   4/14:  Continues to wait for placement.  Good participation with PT/OT. Jonah Manceras a little use of right hand, still unable to .    5/2:  Fitted for AFO today per orthotist.  Continued wait for medicaid and SSI approval.  Metformin dosing adjusted a few days ago.  Glucose 118-126 x 24 hours.  CM spoke with wife, still does not want patient to come home, even if he has to go to a shelter.    5/11: [facility] waiting for disability rating, Medicaid. . Dietician in.  Participating with PT/OT, ambulated total of 400 feet.    5/21:  OOB most of day. Core weak per PT, needs continued strength and balance training. Up in halls much of day today. 5/22:  Conversant today. Would like to set up meeting with wife prior to discharge. Still holding out hope for reconciliation.   Pt spoke with her on phone today, she continues to refuse to tell him where she is living. Has not spoken to step son. Seems to have some unrealistic expectations at this juncture. Spoke with HN, apparently CM still attempting to involve wife to no avail. Current Facility-Administered Medications   Medication Dose Route Frequency    magnesium oxide (MAG-OX) tablet 400 mg  400 mg Oral BID    metFORMIN (GLUCOPHAGE) tablet 500 mg  500 mg Oral BID WITH MEALS    acetaminophen (TYLENOL) tablet 650 mg  650 mg Oral Q4H PRN    diphenhydrAMINE (BENADRYL) capsule 25 mg  25 mg Oral Q6H PRN    acetaminophen (TYLENOL) tablet 650 mg  650 mg Oral Q8H    lip protectant (BLISTEX) ointment 1 Each  1 Each Topical PRN    metoprolol succinate (TOPROL-XL) XL tablet 25 mg  25 mg Oral DAILY    polyethylene glycol (MIRALAX) packet 17 g  17 g Oral DAILY PRN    guaiFENesin (ROBITUSSIN) 100 mg/5 mL oral liquid 100 mg  100 mg Oral Q4H PRN    hydrALAZINE (APRESOLINE) 20 mg/mL injection 20 mg  20 mg IntraVENous Q4H PRN    atorvastatin (LIPITOR) tablet 80 mg  80 mg Oral QHS    lisinopril (PRINIVIL, ZESTRIL) tablet 40 mg  40 mg Oral DAILY    sodium chloride (NS) flush 5-40 mL  5-40 mL IntraVENous PRN    ondansetron (ZOFRAN) injection 4 mg  4 mg IntraVENous Q4H PRN    aspirin chewable tablet 81 mg  81 mg Oral DAILY    enoxaparin (LOVENOX) injection 40 mg  40 mg SubCUTAneous Q24H    dextrose 40% (GLUTOSE) oral gel 1 Tube  15 g Oral PRN    glucagon (GLUCAGEN) injection 1 mg  1 mg IntraMUSCular PRN    dextrose (D50W) injection syrg 12.5-25 g  25-50 mL IntraVENous PRN        Review of Systems  A comprehensive review of systems was negative except for that written in the HPI.     Objective:     Visit Vitals  /89 (BP 1 Location: Right arm, BP Patient Position: At rest)   Pulse 83   Temp 98.8 °F (37.1 °C)   Resp 16   Ht 5' 6\" (1.676 m)   Wt 79.8 kg (175 lb 14.4 oz)   SpO2 98%   BMI 28.39 kg/m²      O2 Device: Room air    Temp (24hrs), Av.1 °F (36.7 °C), Min:97.7 °F (36.5 °C), Max:98.8 °F (37.1 °C)    General appearance: Conversant, oriented. Denies need or new issues.    Head: Normocephalic, without obvious abnormality, atraumatic  Eyes: conjunctivae/corneas clear. PERRL  Throat: Continued mild left facial weakness.  Lips, mucosa, and tongue normal. Teeth and gums normal.  Eating normally. Neck: supple, symmetrical, trachea midline, no JVD  Lungs: clear to auscultation bilaterally  Heart: regular rate and rhythm, S1, S2 normal, no murmur, click, rub or gallop  Abdomen: soft, non-tender. Bowel sounds normal. No masses,  no organomegaly  Extremities: Persistent left side upper and lower residual weakness, improved since admission.  Doing well with right hand only. Using brace. Skin: Skin color, texture, turgor normal. No rashes or lesions  Neurologic: As above.     Additional comments: Notes,orders, test results, vitals reviewed    Data Review   Ref.  Range 5/13/2020 05:29   WBC Latest Ref Range: 4.3 - 11.1 K/uL 5.6   RBC Latest Ref Range: 4.23 - 5.6 M/uL 4.17 (L)   HGB Latest Ref Range: 13.6 - 17.2 g/dL 11.2 (L)   HCT Latest Ref Range: 41.1 - 50.3 % 35.0 (L)   MCV Latest Ref Range: 79.6 - 97.8 FL 83.9   MCH Latest Ref Range: 26.1 - 32.9 PG 26.9   MCHC Latest Ref Range: 31.4 - 35.0 g/dL 32.0   RDW Latest Ref Range: 11.9 - 14.6 % 16.0 (H)   PLATELET Latest Ref Range: 150 - 450 K/uL 181   MPV Latest Ref Range: 9.4 - 12.3 FL 11.6   ABSOLUTE NRBC Latest Ref Range: 0.0 - 0.2 K/uL 0.00   Sodium Latest Ref Range: 136 - 145 mmol/L 140   Potassium Latest Ref Range: 3.5 - 5.1 mmol/L 4.2   Chloride Latest Ref Range: 98 - 107 mmol/L 107   CO2 Latest Ref Range: 21 - 32 mmol/L 26   Anion gap Latest Ref Range: 7 - 16 mmol/L 7   Glucose Latest Ref Range: 65 - 100 mg/dL 99   BUN Latest Ref Range: 6 - 23 MG/DL 20   Creatinine Latest Ref Range: 0.8 - 1.5 MG/DL 1.01   Calcium Latest Ref Range: 8.3 - 10.4 MG/DL 9.2   Magnesium Latest Ref Range: 1.8 - 2.4 mg/dL 1.7 (L)   GFR est non-AA Latest Ref Range: >60 ml/min/1.73m2 >60   GFR est AA Latest Ref Range: >60 ml/min/1.73m2       Assessment/Plan:     Acute to early subacute infarcts in the left cerebellar hemisphere, in the periventricular deep left frontoparietal white matter and likely additional areas of restricted diffusion in the right paramedian yariel.  Old lacunar infarcts also.                  Making good progress with PT/OT                Speech and swallowing improved to the extent         SLP signed off                 Pending placement after approved for SSI and      Medicaid     Dysarthria due to stroke:  Improved immensely, able to understand all speech.               SLP releasing 4/14      Difficult placement with marital discord prior to stroke:  Wife does not want him at home:  CM working on tirelessly              Now issues obtaining MR for [employer], needs SSI              CM spoke with wife 5/1, she still does not want patient to come  home. Patient spoke with her again 5/19, does not even know where she lives     Hypertension:  Continue lisinopril, toprol XL     IDDM II:  Continue glucophage, diabetic diet.  A1C: 8.4, rechecking               adding SSI 4/15              DM education and  management continues to follow intermittently,  increased glucophage frequency 4/30              Discontinue SSI      Arrhythmia:  On beta blocker     PFO 12/17/2019:                Will need 4 week event monitor on  discharge               Will need PFO closure at some point after discharge per Cards      Hypomagnesemia:  Replace and recheck               Increasing daily 400 mg dose to bid 5/2.             monitor [id number] discussed with: Patient and Nurse    Signed By: Vanesa Urban NP     May 22, 2020

## 2020-05-23 NOTE — PROGRESS NOTES
Problem: Self Care Deficits Care Plan (Adult)  Goal: *Acute Goals and Plan of Care (Insert Text)  Description  GOALS REVIEWED and UPDATED ON 5/12/2020 (BOLD INDICATES MOST RECENTLY UPDATED GOAL):  1. Patient will demonstrate appropriate safety awareness and protection of L UE during bed mobility and functional transfers with no verbal cues. CONTINUE   2. Patient will complete lower body bathing and dressing with Min A and adaptive equipment as needed. CONTINUE  3. Patient will complete upper body bathing and dressing with Supervision and adaptive equipment as needed. 4. Patient will complete weightbearing into the L UE with ADL tasks with minimal assistance to improve ability to use as a functional assist during ADL tasks. CONTINUE  5. Patient will demonstrate L UE SROM HEP within 7 days. CONTINUE  6. Pt will complete bed mobility with Mod I and no verbal cueing. CONTINUE  7. Pt will complete functional transfers with Supervision with equipment as needed. CONTINUE  8. Pt will don/doff UE sling with Mod I and no verbal cueing. CONTINUE  9. Pt will complete toileting with Supervision for toilet transfer and gown management. 10. Patient will participate in using L UE as a functional assist for ADL for 15 minutes with minimal facilitation. CONTINUE  11. Patient will complete sit to stand at midline with Supervision and cueing. 12. Patient will complete therapeutic exercises with L UE with minimal tactile cues for facilitation of the LUE. CONTINUE  13. Patient will don L LE AFO with SBA and minimal verbal cueing.     Outcome: Progressing Towards Goal     OCCUPATIONAL THERAPY: Daily Note and AM    5/23/2020  INPATIENT: OT Visit Days: 4  Payor: [id number] / Plan: [place] / Product Type: Medicaid /      NAME/AGE/GENDER: Maya Kelley is a 53 y.o. male   PRIMARY DIAGNOSIS:  Acute ischemic stroke ([facility] [address] [I63.9]  Cerebrovascular accident (CVA) due to embolism ([facility] [address] [I63.9] Acute embolic stroke ([facility] [address] Acute embolic stroke ([facility] [address]    MQN-45: Treatment Diagnosis:    · Generalized Muscle Weakness (M62.81)  · Other lack of cordination (R27.8)  · Hemiplegia and hemiparesis following cerebral infarction affecting   · left non-dominant side (I69.354)  · Abnormal posture (R29.3)   Precautions/Allergies:    NO PULLING ON LUE   LUE in sling with shoulder joint approximated and supported, needs taping   Patient has no known allergies. ASSESSMENT:     Mr. Brianna Mcgarry presents to the hospital with L sided weakness and acute ischemic CVA. MRI revealed acute to subacute L cerebellar and R paramedian yariel infarcts. 5/23/2020: Pt was supine in bed. Pt completed bed mobility with supervision. Pt completed sponge bath and dressing with the assistance listed below while sitting EOB. Pt had good sitting balance. Minimal progress made. Continue POC. This section established at most recent assessment   PROBLEM LIST (Impairments causing functional limitations):  1. Decreased Strength  2. Decreased ADL/Functional Activities  3. Decreased Transfer Abilities  4. Decreased Ambulation Ability/Technique  5. Decreased Balance  6. Decreased Activity Tolerance  7. Increased Fatigue  8. Decreased Flexibility/Joint Mobility  9. Decreased Knowledge of Precautions  10. Decreased [place] with Home Exercise Program   INTERVENTIONS PLANNED: (Benefits and precautions of occupational therapy have been discussed with the patient.)  1. Activities of daily living training  2. Adaptive equipment training  3. Balance training  4. Clothing management  5. Cognitive training  6. Donning&doffing training  7. Keanu tech training  8. Neuromuscular re-eduation  9. Therapeutic activity  10. Therapeutic exercise     TREATMENT PLAN: Frequency/Duration: Follow patient 3 times per week to address above goals.   Rehabilitation Potential For Stated Goals: Excellent     REHAB RECOMMENDATIONS (at time of discharge pending progress): Placement: It is my opinion, based on this patient's performance to date, that Mr. Latrell Collins may benefit from intensive therapy at an [address] [place] [address] after discharge due to potential to make ongoing and sustainable functional improvement that is of practical value. Joan Shadow Pt functioning far below independent baseline, demonstrating good improvement and participation. Pt would likely benefit greatly and increase independence from inpatient rehab stay. Equipment:    TBD               OCCUPATIONAL PROFILE AND HISTORY:   History of Present Injury/Illness (Reason for Referral):  See H&P  Past Medical History/Comorbidities:   Mr. Latrell Collins  has a past medical history of Acute ischemic stroke ([address] (12/12/2019), Acute pancreatitis (11/19/2014), Cerebrovascular accident (CVA) due to embolism ([address] (12/12/2019), Diabetes ([address] (2002), Diabetes ([address] Diabetes mellitus, and Hypertension. He also has no past medical history of Arthritis, Asthma, Autoimmune disease ([address] CAD (coronary artery disease), Cancer ([address] Chronic kidney disease, COPD, Dementia, Dementia ([address] Heart failure ([address] Ill-defined condition, Infectious disease, Liver disease, Other ill-defined conditions(799.89), Psychiatric disorder, PUD (peptic ulcer disease), Seizures ([address] or Sleep disorder. Mr. Latrell Collins  has a past surgical history that includes hx hernia repair and hx orthopaedic. Social History/Living Environment:   Home Environment: Apartment  # Steps to Enter: 12  Rails to Enter: Yes  [employer] : Bilateral  One/Two Story Residence: One story  Living Alone: No  Support Systems: Spouse/Significant Other/Partner  Patient Expects to be Discharged to[de-identified] Unknown  Current DME Used/Available at Home: None  Tub or Shower Type: Tub/Shower combination  Prior Level of Function/Work/Activity:  Pt lives at home with his wife. Pt is typically independent with ADL/functional mobility. Pt does not drive. Pt was working part-time at [employer]. Personal Factors:          Age:  53 y.o. Past/Current Experience:  CVA with flaccid L side        Other factors that influence how disability is experienced by the patient:  multiple co-morbidities    Number of Personal Factors/Comorbidities that affect the Plan of Care: Extensive review of physical, cognitive, and psychosocial performance (3+):  HIGH COMPLEXITY   ASSESSMENT OF OCCUPATIONAL PERFORMANCE[de-identified]   Activities of Daily Living:   Basic ADLs (From Assessment) Complex ADLs (From Assessment)   Feeding: Setup  Oral Facial Hygiene/Grooming: Minimum assistance  Bathing: Minimum assistance, Moderate assistance  Upper Body Dressing: Minimum assistance  Lower Body Dressing: Moderate assistance  Toileting: Minimum assistance Instrumental ADL  Meal Preparation: Total assistance  Homemaking: Total assistance  Medication Management: Total assistance  Financial Management: Total assistance   Grooming/Bathing/Dressing Activities of Daily Living   Grooming  Washing Face: Supervision;Set-up Cognitive Retraining  Safety/Judgement: Fall prevention   Upper Body Bathing  Bathing Assistance: Min A   Position Performed: Seated in chair      Lower Body Bathing  Bathing Assistance: Minimum assistance  Lower Body : Minimum assistance  Position Performed: Seated edge of bed     Upper Body Dressing Assistance  Dressing Assistance: Dustin Ger[address]: Supervision     Lower Body Dressing Assistance  Socks:  Total assistance (dependent)       Most Recent Physical Functioning:   Gross Assessment:                  Posture:     Balance:    Bed Mobility:     Wheelchair Mobility:     Transfers:               Patient Vitals for the past 6 hrs:   BP BP Patient Position SpO2 Pulse   05/23/20 1200 130/82 At rest 99 % 84       Mental Status  Neurologic State: Alert  Orientation Level: Oriented X4  Cognition: Appropriate for age attention/concentration, Appropriate decision making  Perception: Verbal, Tactile  Perseveration: Verbal cues provided, Tactile cues provided  Safety/Judgement: Fall prevention                          Physical Skills Involved:  1. Range of Motion  2. Balance  3. Strength  4. Activity Tolerance  5. Fine Motor Control  6. Gross Motor Control Cognitive Skills Affected (resulting in the inability to perform in a timely and safe manner):  1. Perception  2. Expression Psychosocial Skills Affected:  1. Habits/Routines  2. Environmental Adaptation  3. Social Interaction  4. Emotional Regulation  5. Self-Awareness  6. Awareness of Others  7. Social Roles   Number of elements that affect the Plan of Care: 5+:  HIGH COMPLEXITY   CLINICAL DECISION MAKIN[address] [place] [address] AM-PAC 6 Clicks   Daily Activity Inpatient Short Form  How much help from another person does the patient currently need. .. Total A Lot A Little None   1. Putting on and taking off regular lower body clothing? [] 1   [] 2   [x] 3   [] 4   2. Bathing (including washing, rinsing, drying)? [] 1   [] 2   [x] 3   [] 4   3. Toileting, which includes using toilet, bedpan or urinal?   [] 1   [] 2   [x] 3   [] 4   4. Putting on and taking off regular upper body clothing? [] 1   [] 2   [x] 3   [] 4   5. Taking care of personal grooming such as brushing teeth? [] 1   [] 2   [x] 3   [] 4   6. Eating meals? [] 1   [] 2   [x] 3   [] 4   © , Trustees of [address] [place] [address], under license to [employer]. All rights reserved      Score:  Initial: 11 Most Recent: 18 (20)    Interpretation of Tool:  Represents activities that are increasingly more difficult (i.e. Bed mobility, Transfers, Gait). Medical Necessity:     · Patient demonstrates   · good and excellent  ·  rehab potential due to higher previous functional level. Reason for Services/Other Comments:  · Patient continues to require skilled intervention due to   · Decreased independence with ADL/functional transfers that impacts overall quality of life.    · .   Use of outcome tool(s) and clinical judgement create a POC that gives a: MODERATE COMPLEXITY         TREATMENT:   (In addition to Assessment/Re-Assessment sessions the following treatments were rendered)     Pre-treatment Symptoms/Complaints: Pt without complaints and reports increased interest in performance of LUE exercises. Pain: Initial:   Pain Intensity 1: 0 Post Session: Unchanged     Self Care: (25): Procedure(s) (per grid) utilized to improve and/or restore self-care/home management as related to dressing and bathing. Required min verbal and manual cueing to facilitate activities of daily living skills. GE (Gravity Eliminated) Date:  5/18/2020 Date:   Date:     Activity/Exercise Parameters Parameters Parameters   GE Shoulder Flexion 12 reps     GE Elbow Flexion  12 reps     GE Elbow Extension  12 reps     GE Wrist Radial and Ulnar Deviation  12 reps each way     GE Shoulder Internal/External Rotation 12 reps each way     GE Wrist Flexion/Extension  12 reps each way     GE Object Grasp/Release (Red Sponge) 12 reps           Date:   4/22/2020 Date:  4/24/2020 Date:  4/27/2020 Date:   5/7/20   Activity/Exercise Parameters Parameters Parameters    Shoulder flexion SROM/AAROM       Elbow flexion/extension SROM/AAROM       Forearm supination/pronation        Wrist extension     20 reps AROM (partial ROM);  Attempted isometric extension but patient not able to hold position in extension    Finger flexion/extension     AAROM for tendon gliding (reports pain with flexion); 15-20 reps    Forward Reaching with soccer ball 20 reps (using two handed technique with R hand over left; therapist supporting L elbow) 20 reps (using two handed technique with R hand over left; therapist supporting L elbow) 25 reps (using two handed technique with R hand over left; therapist supporting L elbow) 20 reps using two handed technique and pushing forward for protraction/forward reaching (using washcloth)   Crossing Midline with soccer ball    20 reps each way (using two handed technique with R hand over left; therapist supporting L elbow) Pt completed with for 20 reps with support at the elbow and maximal facilitation from therapist to moving UE (using washcloth)   Shoulder Horizontal Abduction & Adduction with soccer ball 20 reps each way (using two handed technique with R hand over left; therapist supporting L elbow) 20 reps each way (using two handed technique with R hand over left; therapist supporting L elbow)     Shoulder Flexion & Crossing Midline with cones 14 reps (pt stabilized L wrist; therapist supporting L elbow) 14 reps (pt stabilized L wrist; therapist supporting L elbow) 14 reps (pt stabilized L wrist; therapist supporting L elbow) 5 reps with maximal assistance to support at the elbow and wrist   Digit Flexion & Extension with pegs 24 reps (using two handed technique with assist needed for object release) 24 reps (using two handed technique with decreased assist needed for object release) 24 reps (using two handed technique with decreased assist needed for object release)    1st CMC Flexion/Extension  10 reps (using yellow putty with Total A from therapist to complete) 15 reps (using yellow putty with Total A from therapist to complete)                        Date:  4/14/20  Date:  4/15/20 Date:   4/16/20 Date:  5/7/20   Activity/Exercise Parameters Parameters     Midline orientation sit to stand  Moderate facilitation at the L LE and for decreased rotation at the trunk      Midline sit to squat  Min to mod A w/ hands in his lap       Weightbearing into L side standing at sink        Forward reach with cane 10 reps with moderate facilitation at the elbow/scapula 15 reps with moderate facilitation at the elbow/scapula into protraction  15 reps with moderate facilitation at the elbow/scapula into protraction 25 reps with moderate facilitation at the elbow/scapula  (cane on the floor and pt pushing forward)   External rotation with cane 10 reps with minimal facilitation  15 reps with SBA/CGA  15 reps with SBA/CGA 25 reps with minimal facilitation (cane on the floor and elbow at his side)   Posture  Upright sitting/standing with verbal/visual cueing  Pt encouraged for upright posture with moderate cues throughout session  Pt encouraged for upright posture with moderate cues throughout session     Weightbearing into elbow  10 reps with moderate assistance pushing up into midline  2 sets with prolonged weight bearing and reaching to the L of midline for 2 sets x 15 reps  Sitting at the table with pt encouraged for propped elbows and weightbearing through the L with moderate cues     Weightbearing into hand Mod to maximal assistance with facilitation at the elbow; 2 sets x 10 reps       Elbow flexion 10 reps AAROM; 10 reps holding ball in B hands       Grasp release of washcloth  10 reps with moderate to maximal facilitation for reaching   4-5 reps with additional time     Trunk Rotation   15 reps with minima facilitation  15 reps with minimal facilitation and additional time            Side Scooting   Minimal facilitation and assistance for positioning and weightbearing of L Hand through his L knee and forward forward flexion at the trunk        Braces/Orthotics/Lines/Etc:   · O2 device: Room air  Treatment/Session Assessment:    Response to Treatment:  Pt observed to have increased ROM in gravity eliminated position. · Interdisciplinary Collaboration:   o Certified Occupational Therapy Assistant  o Registered Nurse  · After treatment position/precautions:   o Supine in bed  o Bed alarm/tab alert on  o Bed/Chair-wheels locked  o Bed in low position  o Call light within reach  o RN notified  o Side rails x 3   · Compliance with Program/Exercises: Compliant all of the time, Will assess as treatment progresses. · Recommendations/Intent for next treatment session:   \"Next visit will focus on advancements to more challenging activities and reduction in assistance provided\".   Total Treatment Duration:   OT Patient Time In/Time Out  Time In: 0865  Time Out: [facility] Jonh Archibald

## 2020-05-24 PROCEDURE — 74011250636 HC RX REV CODE- 250/636: Performed by: INTERNAL MEDICINE

## 2020-05-24 PROCEDURE — 74011250637 HC RX REV CODE- 250/637: Performed by: HOSPITALIST

## 2020-05-24 PROCEDURE — 74011250637 HC RX REV CODE- 250/637: Performed by: NURSE PRACTITIONER

## 2020-05-24 PROCEDURE — 74011250637 HC RX REV CODE- 250/637: Performed by: INTERNAL MEDICINE

## 2020-05-24 PROCEDURE — 65270000029 HC RM PRIVATE

## 2020-05-24 RX ADMIN — Medication 400 MG: at 08:49

## 2020-05-24 RX ADMIN — ACETAMINOPHEN 650 MG: 325 TABLET, FILM COATED ORAL at 06:06

## 2020-05-24 RX ADMIN — ENOXAPARIN SODIUM 40 MG: 40 INJECTION SUBCUTANEOUS at 14:55

## 2020-05-24 RX ADMIN — ASPIRIN 81 MG 81 MG: 81 TABLET ORAL at 08:49

## 2020-05-24 RX ADMIN — LISINOPRIL 40 MG: 20 TABLET ORAL at 08:49

## 2020-05-24 RX ADMIN — ACETAMINOPHEN 650 MG: 325 TABLET, FILM COATED ORAL at 21:23

## 2020-05-24 RX ADMIN — ATORVASTATIN CALCIUM 80 MG: 80 TABLET, FILM COATED ORAL at 21:22

## 2020-05-24 RX ADMIN — GUAIFENESIN 100 MG: 100 SOLUTION ORAL at 16:50

## 2020-05-24 RX ADMIN — Medication 400 MG: at 16:46

## 2020-05-24 RX ADMIN — METFORMIN HYDROCHLORIDE 500 MG: 500 TABLET ORAL at 08:49

## 2020-05-24 RX ADMIN — METOPROLOL SUCCINATE 25 MG: 25 TABLET, FILM COATED, EXTENDED RELEASE ORAL at 08:49

## 2020-05-24 RX ADMIN — ACETAMINOPHEN 650 MG: 325 TABLET, FILM COATED ORAL at 15:02

## 2020-05-24 RX ADMIN — METFORMIN HYDROCHLORIDE 500 MG: 500 TABLET ORAL at 16:51

## 2020-05-24 RX ADMIN — DIPHENHYDRAMINE HYDROCHLORIDE 25 MG: 25 CAPSULE ORAL at 16:49

## 2020-05-24 NOTE — PROGRESS NOTES
Hospitalist Progress Note     Admit Date:  2019  9:07 AM   Name:  Manjeet Clifford   Age:  53 y.o.  :  1975   MRN:  [id number]   PCP:  Nathaly Givens MD  Treatment Team: Attending Provider: Bird Baker MD; Care Manager: Mariano Berg LMSW; Utilization Review: Minoo Jones RN; Nurse Practitioner: Mortimer Hinders, NP; Hospitalist: Nicole Roldan NP; Utilization Review: Bridget Hearn RN; Primary Nurse: Elisa Simmons Charge Nurse: Naomie TARIQ    Subjective:   HPI and or CC:  54 yo AA male with PMH of DM, HTN admitted  for CVA affecting numerous areas of the brain. He was placed on ASA and statin. He has remained alert and oriented x3. Some movement to right side but unable to grasp with right hand. He is currently waiting on placement and this has remained difficult due to waiting on Medicaid. :  Alert and oriented x3. Voices no complaints today. Continues to wait for placement. PT/OT following patient. Continues to wait on SSI and Medicaid approval.        Objective:     Patient Vitals for the past 24 hrs:   Temp Pulse Resp BP SpO2   20 0723 98.1 °F (36.7 °C) 73 16 164/74 97 %   20 0400 97.6 °F (36.4 °C) 70 16 124/67 98 %   20 0000 97.9 °F (36.6 °C) 65 16 124/77 97 %   20 2000 98.1 °F (36.7 °C) 70 18 113/75 96 %   20 1600 98.1 °F (36.7 °C) 72 18 124/83 98 %   20 1200 97.8 °F (36.6 °C) 84 18 130/82 99 %     Oxygen Therapy  O2 Sat (%): 97 % (20 0723)  Pulse via Oximetry: 75 beats per minute (20 0400)  O2 Device: Room air (20 1648)  No intake or output data in the 24 hours ending 20 1036      REVIEW OF SYSTEMS: Comprehensive ROS performed and negative except as stated in HPI. Physical Examination:  General:       No acute distress    Lungs:          CTA bilaterally. Resp even and nonlabored  Heart:            S1S2 present without murmurs rubs gallops. RRR.  No LE edema  Abdomen:    Soft, non tender, non distended. BS present  Extremities: No cyanosis. Left sided hemiparesis  Neurologic:  moves right hand and raises arm at elbow moderately, unable to squeeze my fingers left hand, mildly slurred speech.  PERRLA, strength 4/5 RUE/RLE. Data Review:  I have reviewed all labs, meds, telemetry events, and studies from the last 24 hours. No results found for this or any previous visit (from the past 24 hour(s)).      All Micro Results     None          Current Meds:  Current Facility-Administered Medications   Medication Dose Route Frequency    magnesium oxide (MAG-OX) tablet 400 mg  400 mg Oral BID    metFORMIN (GLUCOPHAGE) tablet 500 mg  500 mg Oral BID WITH MEALS    acetaminophen (TYLENOL) tablet 650 mg  650 mg Oral Q4H PRN    diphenhydrAMINE (BENADRYL) capsule 25 mg  25 mg Oral Q6H PRN    acetaminophen (TYLENOL) tablet 650 mg  650 mg Oral Q8H    lip protectant (BLISTEX) ointment 1 Each  1 Each Topical PRN    metoprolol succinate (TOPROL-XL) XL tablet 25 mg  25 mg Oral DAILY    polyethylene glycol (MIRALAX) packet 17 g  17 g Oral DAILY PRN    guaiFENesin (ROBITUSSIN) 100 mg/5 mL oral liquid 100 mg  100 mg Oral Q4H PRN    hydrALAZINE (APRESOLINE) 20 mg/mL injection 20 mg  20 mg IntraVENous Q4H PRN    atorvastatin (LIPITOR) tablet 80 mg  80 mg Oral QHS    lisinopril (PRINIVIL, ZESTRIL) tablet 40 mg  40 mg Oral DAILY    sodium chloride (NS) flush 5-40 mL  5-40 mL IntraVENous PRN    ondansetron (ZOFRAN) injection 4 mg  4 mg IntraVENous Q4H PRN    aspirin chewable tablet 81 mg  81 mg Oral DAILY    enoxaparin (LOVENOX) injection 40 mg  40 mg SubCUTAneous Q24H    dextrose 40% (GLUTOSE) oral gel 1 Tube  15 g Oral PRN    glucagon (GLUCAGEN) injection 1 mg  1 mg IntraMUSCular PRN    dextrose (D50W) injection syrg 12.5-25 g  25-50 mL IntraVENous PRN       Diet:  DIET NUTRITIONAL SUPPLEMENTS  DIET DIABETIC CONSISTENT CARB    Other Studies (last 24 hours):  No results found.    Assessment and Plan:     Hospital Problems as of 5/24/2020 Date Reviewed: 3/3/2017          Codes Class Noted - Resolved POA    Hypomagnesemia ICD-10-CM: E83.42  ICD-9-CM: 275.2  3/7/2020 - Present Unknown        PFO (patent foramen ovale) ICD-10-CM: Q21.1  ICD-9-CM: 745.5  12/17/2019 - Present Yes        Arrhythmia ICD-10-CM: I49.9  ICD-9-CM: 427.9  12/15/2019 - Present Yes        Hyperlipemia ICD-10-CM: E78.5  ICD-9-CM: 272.4  12/15/2019 - Present Yes        * (Principal) Acute embolic stroke ([facility] [address] [id number]: I63.9  ICD-9-CM: 434.11  12/12/2019 - Present Yes        Hypertension (Chronic) ICD-10-CM: I10  ICD-9-CM: 401.9  Unknown - Present Yes        Type 2 diabetes mellitus (HCC) (Chronic) ICD-10-CM: E11.9  ICD-9-CM: 250.00  Unknown - Present Yes              A/P:    Acute embolic stroke  MRI brain showed acute to early subacute infarcts in the left cerebellar hemisphere, in the periventricular deep left frontoparietal white matter and likely additional areas of restricted diffusion in the right paramedian yariel. +PFO on echo  On ASA, statin  Per PT recommendations, orthotist consult for AFO for L LE. Will need shunt closure once discharged-  4/30-I spoke with cardiology, they state PFO closure can wait until patient discharged to a facility.     Hypomagnesemia  Resolved       Hypertension  Continue metoprolol and ACE inhibitor  Goal BP <130/80     Type 2 diabetes mellitus:    Monitor, BGL controlled        Signed:  Karma Menchaca NP

## 2020-05-25 PROCEDURE — 97530 THERAPEUTIC ACTIVITIES: CPT

## 2020-05-25 PROCEDURE — 74011250636 HC RX REV CODE- 250/636: Performed by: INTERNAL MEDICINE

## 2020-05-25 PROCEDURE — 74011250637 HC RX REV CODE- 250/637: Performed by: INTERNAL MEDICINE

## 2020-05-25 PROCEDURE — 74011250637 HC RX REV CODE- 250/637: Performed by: HOSPITALIST

## 2020-05-25 PROCEDURE — 74011250637 HC RX REV CODE- 250/637: Performed by: NURSE PRACTITIONER

## 2020-05-25 PROCEDURE — 65270000029 HC RM PRIVATE

## 2020-05-25 RX ADMIN — ATORVASTATIN CALCIUM 80 MG: 80 TABLET, FILM COATED ORAL at 21:41

## 2020-05-25 RX ADMIN — METFORMIN HYDROCHLORIDE 500 MG: 500 TABLET ORAL at 08:21

## 2020-05-25 RX ADMIN — ENOXAPARIN SODIUM 40 MG: 40 INJECTION SUBCUTANEOUS at 14:29

## 2020-05-25 RX ADMIN — METFORMIN HYDROCHLORIDE 500 MG: 500 TABLET ORAL at 16:47

## 2020-05-25 RX ADMIN — ACETAMINOPHEN 650 MG: 325 TABLET, FILM COATED ORAL at 21:41

## 2020-05-25 RX ADMIN — LISINOPRIL 40 MG: 20 TABLET ORAL at 08:21

## 2020-05-25 RX ADMIN — Medication 400 MG: at 08:21

## 2020-05-25 RX ADMIN — ACETAMINOPHEN 650 MG: 325 TABLET, FILM COATED ORAL at 06:22

## 2020-05-25 RX ADMIN — ACETAMINOPHEN 650 MG: 325 TABLET, FILM COATED ORAL at 14:29

## 2020-05-25 RX ADMIN — ASPIRIN 81 MG 81 MG: 81 TABLET ORAL at 08:21

## 2020-05-25 RX ADMIN — DIPHENHYDRAMINE HYDROCHLORIDE 25 MG: 25 CAPSULE ORAL at 16:52

## 2020-05-25 RX ADMIN — METOPROLOL SUCCINATE 25 MG: 25 TABLET, FILM COATED, EXTENDED RELEASE ORAL at 08:21

## 2020-05-25 RX ADMIN — GUAIFENESIN 100 MG: 100 SOLUTION ORAL at 16:52

## 2020-05-25 RX ADMIN — Medication 400 MG: at 16:47

## 2020-05-25 NOTE — PROGRESS NOTES
Problem: Mobility Impaired (Adult and Pediatric)  Goal: *Acute Goals and Plan of Care  Description  GOALS REVIEWED ON 5/21/2020 (BOLD INDICATES MOST RECENTLY UPDATED GOAL):  1. Patient will perform bed mobility with MODIFIED INDEPENDENCE and 0 verbal cues within 7 treatment days. PROGRESSING; CURRENTLY AT SUPERVISION  2. Patient will perform transfer bed to chair/wheelchair with SUPERVISION within 7 treatment days. PROGRESSING; CURRENTLY AT CGA  3. Patient will demonstrate FAIR+ dynamic balance throughout ambulation within 7 treatment days. PROGRESSING; CURRENTLY AT FAIR  4. Patient demonstrate 3+/5 strength in L LE hip flexion, hip abduction, hip adduction, and knee extension (quad strength) within 7 treatment days. PROGRESSING SEE STRENGTH ASSESSMENT BELOW  5. Patient will ambulate 300ft+ with STAND BY ASSIST and least retrictive assistive device and L LE AFO within 7 treatment days. PROGRESSING CURRENTLY AT CGA FOR 300FT  6. Patient will perform wheelchair mobility 75ft with SUPERVISION and 0 verbal cues within 7 treatment days. PROGRESSING CURRENTLY REQUIRING CGA FOR 50 FT  7. Patient will don/doff LUE sling for protection of L shoulder during transfers/gait with set up within 7 treatment days. PROGRESSING REQUIRING MIN-MODERATE ASSISTANCE  8. Patient will instruct caregiver how to don/doff L LE AFO with independence within 7 treatment days. 9. Patient will tolerate out of bed mobility 4 hours daily to address dynamic sitting balance, promote anterior pelvic tilt, and trunk/core strengthening within 7 treatment days.         PHYSICAL THERAPY: Daily Note and PM 5/25/2020  INPATIENT: PT Visit Days : 3  Payor: [id number] / Plan: [place] / Product Type: Medicaid /       NAME/AGE/GENDER: Britney Burgess is a 53 y.o. male   PRIMARY DIAGNOSIS: Acute ischemic stroke ([address] [I63.9]  Cerebrovascular accident (CVA) due to embolism ([address] [Q43.5] Acute embolic stroke ([address] Acute embolic stroke [facility])       ICD-10: Treatment Diagnosis:   · Difficulty in walking, Not elsewhere classified (R26.2)  · Hemiplegia and hemiparesis following cerebral infarction affecting left non-dominant side (F77.796)   Precaution/Allergies:  Patient has no known allergies. ASSESSMENT:     Mr. Henriquez is a 53year old admitted R MCA CVA. Patient was supine upon contact and agreeable to PT. Patient performs supine to sit with mod I after cues to lower HOB and scoot up towards [facility] to allow use of rail. Sits EOB with good static sitting balance while therapist donned socks, brace, and shoes with total assist. Patient transfers to standing with CGA. Once standing Patient ambulated 300' with luke walker, CGA, and cues for gait sequencing. Encouraged patient to ambulate further but he declined. Patient returned to his room where he transfers back to bed with SBA and to supine with supervision. Overall slow progress towards physical therapy goals. Patient's goals listed above are still appropriate. Will continue skilled PT to address remaining deficits. This section established at most recent assessment   PROBLEM LIST (Impairments causing functional limitations):  1. Decreased Strength  2. Decreased ADL/Functional Activities  3. Decreased Transfer Abilities  4. Decreased Ambulation Ability/Technique  5. Decreased Balance  6. Increased Pain  7. Decreased Activity Tolerance  8. Increased Fatigue  9. Decreased Flexibility/Joint Mobility   INTERVENTIONS PLANNED: (Benefits and precautions of physical therapy have been discussed with the patient.)  1. Balance Exercise  2. Bed Mobility  3. Family Education  4. Gait Training  5. Home Exercise Program (HEP)  6. Manual Therapy  7. Neuromuscular Re-education/Strengthening  8. Range of Motion (ROM)  9. Therapeutic Activites  10. Therapeutic Exercise/Strengthening  11.  Transfer Training     TREATMENT PLAN: Frequency/Duration: 5 times a week for duration of hospital stay  Rehabilitation Potential For Stated Goals: Good     REHAB RECOMMENDATIONS (at time of discharge pending progress):    Placement: It is my opinion, based on this patient's performance to date, that Mr. Leonid Baumann may benefit from intensive therapy at an [address] [place] [address] after discharge due to a probable need for close medical supervision by a rehab physician, a probable need for multiple therapy disciplines and potential to make ongoing and sustainable functional improvement that is of practical value. .  Equipment:    TBD pending progress with therapy. HISTORY:   History of Present Injury/Illness (Reason for Referral):  Per H&P: \"Pt is a 54 y/o smoker with DM, HTN, who presented to ER with L leg and arm weakness, L facial droop, dysarthria. First noted L leg weakness late night 12/11 when he woke up to go to the bathroom. He was normal when he went to bed around 1030. Woke up this morning and had persistent weakness L leg and also now noted in L arm. EMS called. Noted to have slurred speech and L facial droop as well. Code S called in ER around 9am.  MRI with acute infarcts in L cerebellar hemisphere, deep frontoparietal white matter, and R paramedian yariel. Also noted old lacunar infarcts. No large vessel occlusion on CTA, but some stenosis noted. No hx afib, TIA, CVA. No CP, palpitations, SOB. \"  Past Medical History/Comorbidities:   Mr. Leonid Baumann  has a past medical history of Acute ischemic stroke ([address] (12/12/2019), Acute pancreatitis (11/19/2014), Cerebrovascular accident (CVA) due to embolism ([address] (12/12/2019), Diabetes ([address] (2002), Diabetes ([address] Diabetes mellitus, and Hypertension.  He also has no past medical history of Arthritis, Asthma, Autoimmune disease ([address] CAD (coronary artery disease), Cancer ([address] Chronic kidney disease, COPD, Dementia, Dementia ([address] Heart failure ([address] Ill-defined condition, Infectious disease, Liver disease, Other ill-defined conditions(799.89), Psychiatric disorder, PUD (peptic ulcer disease), Seizures ([facility] [address] or Sleep disorder. Mr. Shanell Cueva  has a past surgical history that includes hx hernia repair and hx orthopaedic. Social History/Living Environment:   Home Environment: Apartment  # Steps to Enter: 12  Rails to Enter: Yes  [employer] : Bilateral  One/Two Story Residence: One story  Living Alone: No  Support Systems: Spouse/Significant Other/Partner  Patient Expects to be Discharged to[de-identified] Unknown  Current DME Used/Available at Home: None  Tub or Shower Type: Tub/Shower combination  Prior Level of Function/Work/Activity:  Independent, lives with wife in 2nd story 1 level apartment. No recent falls. Number of Personal Factors/Comorbidities that affect the Plan of Care: 1-2: MODERATE COMPLEXITY   EXAMINATION:   Most Recent Physical Functioning:   Gross Assessment: left hip grossly 2/5 to 3-/5                       Balance:  Sitting: Impaired  Sitting - Static: Good (unsupported)  Sitting - Dynamic: Fair (occasional)  Standing: Impaired  Standing - Static: Good  Standing - Dynamic : Fair Bed Mobility:  Supine to Sit: Modified independent  Wheelchair Mobility: NT     Transfers:  Sit to Stand: Contact guard assistance  Stand to Sit: Contact guard assistance  Gait:     Base of Support: Center of gravity altered  Speed/Clotilde: Slow  Gait Abnormalities: Hemiplegic  Distance (ft): 300 Feet (ft)  Assistive Device: Walker luke  Ambulation - Level of Assistance: Contact guard assistance  Interventions: Safety awareness training; Tactile cues; Verbal cues      Body Structures Involved:  1. Nerves  2. Voice/Speech  3. Bones  4. Joints  5. Muscles Body Functions Affected:  1. Mental  2. Sensory/Pain  3. Neuromusculoskeletal  4. Movement Related Activities and Participation Affected:  1. General Tasks and Demands  2. Mobility  3. Self Care  4.  Interpersonal Interactions and Relationships   Number of elements that affect the Plan of Care: 4+: HIGH COMPLEXITY   CLINICAL PRESENTATION:   Presentation: Evolving clinical presentation with changing clinical characteristics: MODERATE COMPLEXITY   CLINICAL DECISION MAKIN [facility] Mobility Inpatient Short Form  How much difficulty does the patient currently have. .. Unable A Lot A Little None   1. Turning over in bed (including adjusting bedclothes, sheets and blankets)? [] 1   [] 2   [] 3   [x] 4   2. Sitting down on and standing up from a chair with arms ( e.g., wheelchair, bedside commode, etc.)   [] 1   [] 2   [x] 3   [] 4   3. Moving from lying on back to sitting on the side of the bed? [] 1   [] 2   [] 3   [x] 4   How much help from another person does the patient currently need. .. Total A Lot A Little None   4. Moving to and from a bed to a chair (including a wheelchair)? [] 1   [] 2   [x] 3   [] 4   5. Need to walk in hospital room? [] 1   [] 2   [x] 3   [] 4   6. Climbing 3-5 steps with a railing? [] 1   [x] 2   [] 3   [] 4   © , Trustees of [address] [place] [address], under license to [employer]. All rights reserved      Score:  Initial: 13 Most Recent: 19 (Date: 2020)    Interpretation of Tool:  Represents activities that are increasingly more difficult (i.e. Bed mobility, Transfers, Gait). Medical Necessity:     · Patient is expected to demonstrate progress in   · strength, range of motion, balance, coordination, and functional technique  ·  to   · increase independence with all mobility. · .  Reason for Services/Other Comments:  · Patient continues to require skilled intervention due to   · medical complications and mobility deficits which impact his level of function, safety, and independence as indicated above.    · .   Use of outcome tool(s) and clinical judgement create a POC that gives a: Questionable prediction of patient's progress: MODERATE COMPLEXITY        TREATMENT:      Pre-treatment Symptoms/Complaints: see above  Pain: Initial: 0/10 Post Session:  0/10       Therapeutic Activity: (    30 Minutes): Therapeutic activities including bed mobility training, transfer training from various surface heights, ambulation on level ground, static/dynamic standing balance, posture training, instruction in sequencing with luke walker and gait mechanics, and patient education to improve mobility, strength and balance. Required moderate Safety awareness training; Tactile cues; Verbal cues to promote static and dynamic balance in standing, promote coordination of bilateral, lower extremity(s) and promote motor control of bilateral, lower extremity(s). Braces/Orthotics/Lines/Etc:   · L LE AFO  Treatment/Session Assessment:    · Response to Treatment:  See above  · Interdisciplinary Collaboration:   o Physical Therapy Assistant  o Registered Nurse  o Rehabilitation Attendant  · After treatment position/precautions:   o Supine in bed  o Bed alarm/tab alert on  o Bed/Chair-wheels locked  o Bed in low position  o Call light within reach  o RN notified   · Compliance with Program/Exercises: Compliant all of the time  · Recommendations/Intent for next treatment session: \"Next visit will focus on advancements to more challenging activities and reduction in assistance provided\".     Total Treatment Duration:  PT Patient Time In/Time Out  Time In: 9173  Time Out: [address] [facility]

## 2020-05-25 NOTE — PROGRESS NOTES
Problem: Patient Education: Go to Patient Education Activity  Goal: Patient/Family Education  Outcome: Progressing Towards Goal     Problem: Pressure Injury - Risk of  Goal: *Prevention of pressure injury  Description: Document Dewey Scale and appropriate interventions in the flowsheet. Outcome: Progressing Towards Goal  Note: Pressure Injury Interventions:  Sensory Interventions: Assess changes in LOC, Keep linens dry and wrinkle-free, Minimize linen layers, Pressure redistribution bed/mattress (bed type)    Moisture Interventions: Absorbent underpads, Apply protective barrier, creams and emollients    Activity Interventions: Increase time out of bed, PT/OT evaluation    Mobility Interventions: HOB 30 degrees or less, PT/OT evaluation    Nutrition Interventions: Document food/fluid/supplement intake, Discuss nutritional consult with provider, Offer support with meals,snacks and hydration    Friction and Shear Interventions: Apply protective barrier, creams and emollients, HOB 30 degrees or less                Problem: Patient Education: Go to Patient Education Activity  Goal: Patient/Family Education  Outcome: Progressing Towards Goal     Problem: Falls - Risk of  Goal: *Absence of Falls  Description: Document Jeanne Fall Risk and appropriate interventions in the flowsheet.   Outcome: Progressing Towards Goal  Note: Fall Risk Interventions:  Mobility Interventions: Bed/chair exit alarm, OT consult for ADLs, Patient to call before getting OOB, PT Consult for mobility concerns    Mentation Interventions: Adequate sleep, hydration, pain control, Bed/chair exit alarm, Door open when patient unattended    Medication Interventions: Bed/chair exit alarm, Patient to call before getting OOB, Teach patient to arise slowly    Elimination Interventions: Call light in reach, Patient to call for help with toileting needs, Toileting schedule/hourly rounds    History of Falls Interventions: Bed/chair exit alarm, Consult care management for discharge planning, Door open when patient unattended         Problem: Patient Education: Go to Patient Education Activity  Goal: Patient/Family Education  Outcome: Progressing Towards Goal     Problem: Diabetes Self-Management  Goal: *Disease process and treatment process  Description: Define diabetes and identify own type of diabetes; list 3 options for treating diabetes. Outcome: Progressing Towards Goal  Goal: *Incorporating nutritional management into lifestyle  Description: Describe effect of type, amount and timing of food on blood glucose; list 3 methods for planning meals. Outcome: Progressing Towards Goal  Goal: *Incorporating physical activity into lifestyle  Description: State effect of exercise on blood glucose levels. Outcome: Progressing Towards Goal  Goal: *Developing strategies to promote health/change behavior  Description: Define the ABC's of diabetes; identify appropriate screenings, schedule and personal plan for screenings. Outcome: Progressing Towards Goal  Goal: *Using medications safely  Description: State effect of diabetes medications on diabetes; name diabetes medication taking, action and side effects. Outcome: Progressing Towards Goal  Goal: *Monitoring blood glucose, interpreting and using results  Description: Identify recommended blood glucose targets  and personal targets. Outcome: Progressing Towards Goal  Goal: *Prevention, detection, treatment of acute complications  Description: List symptoms of hyper- and hypoglycemia; describe how to treat low blood sugar and actions for lowering  high blood glucose level. Outcome: Progressing Towards Goal  Goal: *Prevention, detection and treatment of chronic complications  Description: Define the natural course of diabetes and describe the relationship of blood glucose levels to long term complications of diabetes.   Outcome: Progressing Towards Goal  Goal: *Developing strategies to address psychosocial issues  Description: Describe feelings about living with diabetes; identify support needed and support network  Outcome: Progressing Towards Goal     Problem: Patient Education: Go to Patient Education Activity  Goal: Patient/Family Education  Outcome: Progressing Towards Goal     Problem: Patient Education: Go to Patient Education Activity  Goal: Patient/Family Education  Outcome: Progressing Towards Goal     Problem: Nutrition Deficit  Goal: *Optimize nutritional status  Outcome: Progressing Towards Goal     Problem: Patient Education: Go to Patient Education Activity  Goal: Patient/Family Education  Outcome: Progressing Towards Goal     Problem: General Medical Care Plan  Goal: *Vital signs within specified parameters  Outcome: Progressing Towards Goal  Goal: *Labs within defined limits  Outcome: Progressing Towards Goal  Goal: *Absence of infection signs and symptoms  Description: Wash hand more often   Outcome: Progressing Towards Goal  Goal: *Optimal pain control at patient's stated goal  Outcome: Progressing Towards Goal  Goal: *Skin integrity maintained  Outcome: Progressing Towards Goal  Goal: *Fluid volume balance  Outcome: Progressing Towards Goal  Goal: *Optimize nutritional status  Outcome: Progressing Towards Goal  Goal: *Anxiety reduced or absent  Outcome: Progressing Towards Goal  Goal: *Progressive mobility and function (eg: ADL's)  Outcome: Progressing Towards Goal     Problem: Patient Education: Go to Patient Education Activity  Goal: Patient/Family Education  Outcome: Progressing Towards Goal     Problem: Patient Education: Go to Patient Education Activity  Goal: Patient/Family Education  Outcome: Progressing Towards Goal     Problem: Patient Education: Go to Patient Education Activity  Goal: Patient/Family Education  Outcome: Progressing Towards Goal     Problem: Patient Education: Go to Patient Education Activity  Goal: Patient/Family Education  Outcome: Progressing Towards Goal     Problem: Ischemic Stroke: Discharge Outcomes  Goal: *Verbalizes anxiety and depression are reduced or absent  Outcome: Progressing Towards Goal  Goal: *Verbalize understanding of risk factor modification(Stroke Metric)  Outcome: Progressing Towards Goal  Goal: *Hemodynamically stable  Outcome: Progressing Towards Goal  Goal: *Absence of aspiration pneumonia  Outcome: Progressing Towards Goal  Goal: *Aware of needed dietary changes  Outcome: Progressing Towards Goal  Goal: *Verbalize understanding of prescribed medications including anti-coagulants, anti-lipid, and/or anti-platelets(Stroke Metric)  Outcome: Progressing Towards Goal  Goal: *Tolerating diet  Outcome: Progressing Towards Goal  Goal: *Aware of follow-up diagnostics related to anticoagulants  Outcome: Progressing Towards Goal  Goal: *Ability to perform ADLs and demonstrates progressive mobility and function  Outcome: Progressing Towards Goal  Goal: *Absence of DVT(Stroke Metric)  Outcome: Progressing Towards Goal  Goal: *Absence of aspiration  Outcome: Progressing Towards Goal  Goal: *Optimal pain control at patient's stated goal  Outcome: Progressing Towards Goal  Goal: *Home safety concerns addressed  Outcome: Progressing Towards Goal  Goal: *Describes available resources and support systems  Outcome: Progressing Towards Goal  Goal: *Verbalizes understanding of activation of EMS(911) for stroke symptoms(Stroke Metric)  Outcome: Progressing Towards Goal  Goal: *Understands and describes signs and symptoms to report to providers(Stroke Metric)  Outcome: Progressing Towards Goal  Goal: *Neurolgocially stable (absence of additional neurological deficits)  Outcome: Progressing Towards Goal  Goal: *Verbalizes importance of follow-up with primary care physician(Stroke Metric)  Outcome: Progressing Towards Goal  Goal: *Smoking cessation discussed,if applicable(Stroke Metric)  Outcome: Progressing Towards Goal  Goal: *Depression screening completed(Stroke Metric)  Outcome: Progressing Towards Goal     Problem: Pain  Goal: *Control of Pain  Outcome: Progressing Towards Goal     Problem: Patient Education: Go to Patient Education Activity  Goal: Patient/Family Education  Outcome: Progressing Towards Goal     Problem: Patient Education: Go to Patient Education Activity  Goal: Patient/Family Education  Outcome: Progressing Towards Goal     Problem: Patient Education: Go to Patient Education Activity  Goal: Patient/Family Education  Outcome: Progressing Towards Goal

## 2020-05-25 NOTE — PROGRESS NOTES
Nutrition Assessment for:   Follow up    Assessment:   Pt is 52 yo male who presented with weakness, facial drooping and slurred speech. He has history of CVA, DM type 2, HTN, pancreatitis, GERD and a smoker. He remains hospitalized awaiting medicaid eligibility.    Pt seen eating lunch during my visit today. Observed 100% of sandwich + french fries consumed. Per pt, 100% of glucerna shake consumed as well. Last recorded BM 5/7. DIET NUTRITIONAL SUPPLEMENTS All Meals; Glucerna Shake  DIET DIABETIC CONSISTENT CARB Mechanical Soft    Three recorded meal intakes since 5/11 of %. Anthropometrics:  Height: 5' 6\" (167.6 cm), Weight: 95.3 kg (210 lb),  , Body mass index is 33.89 kg/m². BMI class of obese. Weight is from 12/12/19. Last 3 Recorded Weights in this Encounter    12/12/19 0906 04/13/20 1340   Weight: 95.3 kg (210 lb) 79.8 kg (175 lb 14.4 oz)   4/13 recorded as standing scale weight. Weight loss of ~17% consistent with muscle atrophy associated with limited mobility. Current Body mass index is 28.39 kg/m². BMI class of overweight. No new weight available. Pt has weekly weights ordered. Macronutrient needs: (using Standing scale 80 kg) reassessed 4/27 with new weight  EER: 4010-8774 kcal/day (25-30 kcal/kg)  EPR:  g/day (1.2-1.3 g/kg)    Nutrition Intervention:  Meals and snacks: Continue current diet. Medical food supplement therapy: Continue glucerna TID. Based on available data and periodic checks of pt po consumption on unit his intake appears adequate to meet needs. Coordination of nutrition care by a Nutrition Professional: Weekly weight remains ordered. Discharge Nutrition Plan: Continue Oral Nutrition Supplement (ONS) at discharge. Recommend Glucerna or a comparable/similar product Three times daily for 30 days unless otherwise directed by your Primary Care Physician.     Montrell Carlson [address], [address] [place] [address], [facility] [address], [address] [facility]

## 2020-05-25 NOTE — PROGRESS NOTES
Hospitalist Progress Note     Admit Date:  2019  9:07 AM   Name:  Kervin Milian   Age:  53 y.o.  :  1975   MRN:  [id number]   PCP:  Danii Ramos MD  Treatment Team: Attending Provider: Trung Vela MD; Care Manager: Richy Martinez LMSW; Utilization Review: Afia Tolliver RN; Nurse Practitioner: Gloria Ho NP; Hospitalist: Dariel Cota NP; Utilization Review: William Garcia RN; Charge Nurse: Luci Dudley RN; Charge Nurse: Julius De Leon; Physical Therapy Assistant: Cristal Norton PTA    Subjective:   HPI and or CC:  54 yo AA male with PMH of DM, HTN admitted  for CVA affecting numerous areas of the brain. He was placed on ASA and statin. He has remained alert and oriented x3. Some movement to right side but unable to grasp with right hand. He is currently waiting on placement and this has remained difficult due to waiting on Medicaid. :  Alert and oriented x3. Voices no complaints today. Continues to wait for placement. Receiving PT/OT. Continues to wait on SSI and Medicaid approval.  He remains afebrile. Objective:     Patient Vitals for the past 24 hrs:   Temp Pulse Resp BP SpO2   20 0730 97.7 °F (36.5 °C) 62 17 130/85 98 %   20 0400 97.7 °F (36.5 °C) 72 17 126/68 96 %   20 0000 97.7 °F (36.5 °C) 70 18 119/75 97 %   20 2000 98 °F (36.7 °C) 81 17 116/86 98 %   20 1549 98.7 °F (37.1 °C) 79 16 115/77 97 %   20 1125 98.2 °F (36.8 °C) 79 16 131/83 99 %     Oxygen Therapy  O2 Sat (%): 98 % (20 0730)  Pulse via Oximetry: 75 beats per minute (20 0400)  O2 Device: Room air (20 1648)  No intake or output data in the 24 hours ending 20 0903      REVIEW OF SYSTEMS: Comprehensive ROS performed and negative except as stated in HPI. Physical Examination:  General:       No acute distress    Lungs:          CTA bilaterally.  Resp even and nonlabored  Heart:            S1S2 present without murmurs rubs gallops. RRR. No LE edema  Abdomen:    Soft, non tender, non distended. BS present  Extremities: No cyanosis. Left sided hemiparesis  Neurologic:  moves right hand and raises arm at elbow moderately, unable to squeeze my fingers left hand, mildly slurred speech.  PERRLA, strength 4/5 RUE/RLE. Data Review:  I have reviewed all labs, meds, telemetry events, and studies from the last 24 hours. No results found for this or any previous visit (from the past 24 hour(s)).      All Micro Results     None          Current Meds:  Current Facility-Administered Medications   Medication Dose Route Frequency    magnesium oxide (MAG-OX) tablet 400 mg  400 mg Oral BID    metFORMIN (GLUCOPHAGE) tablet 500 mg  500 mg Oral BID WITH MEALS    acetaminophen (TYLENOL) tablet 650 mg  650 mg Oral Q4H PRN    diphenhydrAMINE (BENADRYL) capsule 25 mg  25 mg Oral Q6H PRN    acetaminophen (TYLENOL) tablet 650 mg  650 mg Oral Q8H    lip protectant (BLISTEX) ointment 1 Each  1 Each Topical PRN    metoprolol succinate (TOPROL-XL) XL tablet 25 mg  25 mg Oral DAILY    polyethylene glycol (MIRALAX) packet 17 g  17 g Oral DAILY PRN    guaiFENesin (ROBITUSSIN) 100 mg/5 mL oral liquid 100 mg  100 mg Oral Q4H PRN    hydrALAZINE (APRESOLINE) 20 mg/mL injection 20 mg  20 mg IntraVENous Q4H PRN    atorvastatin (LIPITOR) tablet 80 mg  80 mg Oral QHS    lisinopril (PRINIVIL, ZESTRIL) tablet 40 mg  40 mg Oral DAILY    sodium chloride (NS) flush 5-40 mL  5-40 mL IntraVENous PRN    ondansetron (ZOFRAN) injection 4 mg  4 mg IntraVENous Q4H PRN    aspirin chewable tablet 81 mg  81 mg Oral DAILY    enoxaparin (LOVENOX) injection 40 mg  40 mg SubCUTAneous Q24H    dextrose 40% (GLUTOSE) oral gel 1 Tube  15 g Oral PRN    glucagon (GLUCAGEN) injection 1 mg  1 mg IntraMUSCular PRN    dextrose (D50W) injection syrg 12.5-25 g  25-50 mL IntraVENous PRN       Diet:  DIET NUTRITIONAL SUPPLEMENTS  DIET DIABETIC CONSISTENT CARB    Other Studies (last 24 hours):  No results found. Assessment and Plan:     Hospital Problems as of 5/25/2020 Date Reviewed: 3/3/2017          Codes Class Noted - Resolved POA    Hypomagnesemia ICD-10-CM: E83.42  ICD-9-CM: 275.2  3/7/2020 - Present Unknown        PFO (patent foramen ovale) ICD-10-CM: Q21.1  ICD-9-CM: 745.5  12/17/2019 - Present Yes        Arrhythmia ICD-10-CM: I49.9  ICD-9-CM: 427.9  12/15/2019 - Present Yes        Hyperlipemia ICD-10-CM: E78.5  ICD-9-CM: 272.4  12/15/2019 - Present Yes        * (Principal) Acute embolic stroke ([facility] [address] [id number]: I63.9  ICD-9-CM: 434.11  12/12/2019 - Present Yes        Hypertension (Chronic) ICD-10-CM: I10  ICD-9-CM: 401.9  Unknown - Present Yes        Type 2 diabetes mellitus (HCC) (Chronic) ICD-10-CM: E11.9  ICD-9-CM: 250.00  Unknown - Present Yes              A/P:    Acute embolic stroke  MRI brain showed acute to early subacute infarcts in the left cerebellar hemisphere, in the periventricular deep left frontoparietal white matter and likely additional areas of restricted diffusion in the right paramedian yariel. +PFO on echo  On ASA, statin  Per PT recommendations, orthotist consult for AFO for L LE. Will need shunt closure once discharged-  4/30-I spoke with cardiology, they state PFO closure can wait until patient discharged to a facility.     Hypomagnesemia  Resolved       Hypertension  Continue metoprolol and ACE inhibitor  Goal BP <130/80     Type 2 diabetes mellitus:    Monitor, BGL controlled        Signed:  Mandi Serra NP

## 2020-05-26 PROCEDURE — 74011250637 HC RX REV CODE- 250/637: Performed by: INTERNAL MEDICINE

## 2020-05-26 PROCEDURE — 65270000029 HC RM PRIVATE

## 2020-05-26 PROCEDURE — 74011250637 HC RX REV CODE- 250/637: Performed by: HOSPITALIST

## 2020-05-26 PROCEDURE — 74011250637 HC RX REV CODE- 250/637: Performed by: NURSE PRACTITIONER

## 2020-05-26 PROCEDURE — 74011250636 HC RX REV CODE- 250/636: Performed by: INTERNAL MEDICINE

## 2020-05-26 PROCEDURE — 97530 THERAPEUTIC ACTIVITIES: CPT

## 2020-05-26 RX ADMIN — METOPROLOL SUCCINATE 25 MG: 25 TABLET, FILM COATED, EXTENDED RELEASE ORAL at 08:06

## 2020-05-26 RX ADMIN — ACETAMINOPHEN 650 MG: 325 TABLET, FILM COATED ORAL at 14:31

## 2020-05-26 RX ADMIN — DIPHENHYDRAMINE HYDROCHLORIDE 25 MG: 25 CAPSULE ORAL at 16:55

## 2020-05-26 RX ADMIN — LISINOPRIL 40 MG: 20 TABLET ORAL at 08:06

## 2020-05-26 RX ADMIN — METFORMIN HYDROCHLORIDE 500 MG: 500 TABLET ORAL at 16:39

## 2020-05-26 RX ADMIN — ENOXAPARIN SODIUM 40 MG: 40 INJECTION SUBCUTANEOUS at 14:30

## 2020-05-26 RX ADMIN — GUAIFENESIN 100 MG: 100 SOLUTION ORAL at 16:55

## 2020-05-26 RX ADMIN — ASPIRIN 81 MG 81 MG: 81 TABLET ORAL at 08:05

## 2020-05-26 RX ADMIN — ACETAMINOPHEN 650 MG: 325 TABLET, FILM COATED ORAL at 21:34

## 2020-05-26 RX ADMIN — Medication 400 MG: at 08:06

## 2020-05-26 RX ADMIN — Medication 400 MG: at 17:57

## 2020-05-26 RX ADMIN — ACETAMINOPHEN 650 MG: 325 TABLET, FILM COATED ORAL at 05:20

## 2020-05-26 RX ADMIN — ATORVASTATIN CALCIUM 80 MG: 80 TABLET, FILM COATED ORAL at 21:34

## 2020-05-26 RX ADMIN — METFORMIN HYDROCHLORIDE 500 MG: 500 TABLET ORAL at 08:06

## 2020-05-26 NOTE — PROGRESS NOTES
Hospitalist Progress Note     Admit Date:  2019  9:07 AM   Name:  Gifty Johnson   Age:  53 y.o.  :  1975   MRN:  [id number]   PCP:  William Mejias MD  Treatment Team: Attending Provider: Karma Chavez DO; Care Manager: Mamie Wiley [facility] [place]; Utilization Review: Richi Srinivasan RN; Nurse Practitioner: Nato Newell NP; Hospitalist: Sri Kaur NP; Utilization Review: Deyanira Galicia RN; Charge Nurse: Louis Denise; Physical Therapy Assistant: Travis Guzman PTA    Subjective:   HPI and or CC:  54 yo AA male with PMH of DM, HTN admitted  for CVA affecting numerous areas of the brain. He was placed on ASA and statin. He has remained alert and oriented x3. Some movement to right side but unable to grasp with right hand. He is currently waiting on placement and this has remained difficult due to waiting on Medicaid. :  Alert and oriented x3. Voices no complaints today. Continues to wait for placement. PT/OT following patient. Continues to wait on SSI and Medicaid approval.        Objective:     Patient Vitals for the past 24 hrs:   Temp Pulse Resp BP SpO2   20 0745 97.7 °F (36.5 °C) 98 17 116/74 98 %   20 0419 98 °F (36.7 °C) 81 17 115/81 94 %   20 2312 98.2 °F (36.8 °C) 76 17 114/75 99 %   20 1932 98 °F (36.7 °C) 74 17 112/75 98 %   20 1528 98.2 °F (36.8 °C) 74 17 122/74 98 %   20 1141 98.3 °F (36.8 °C) 68 17 142/83 91 %     Oxygen Therapy  O2 Sat (%): 98 % (20 0745)  Pulse via Oximetry: 75 beats per minute (20 0400)  O2 Device: Room air (20 1648)  No intake or output data in the 24 hours ending 20 1109      REVIEW OF SYSTEMS: Comprehensive ROS performed and negative except as stated in HPI. Physical Examination:  General:       No acute distress    Lungs:          CTA bilaterally. Resp even and nonlabored  Heart:            S1S2 present without murmurs rubs gallops. RRR.  No LE edema  Abdomen:    Soft, non tender, non distended. BS present  Extremities: No cyanosis. Left sided hemiparesis  Neurologic:  moves right hand and raises arm at elbow moderately, unable to squeeze my fingers left hand, mildly slurred speech.  PERRLA, strength 4/5 RUE/RLE. Data Review:  I have reviewed all labs, meds, telemetry events, and studies from the last 24 hours. No results found for this or any previous visit (from the past 24 hour(s)).      All Micro Results     None          Current Meds:  Current Facility-Administered Medications   Medication Dose Route Frequency    magnesium oxide (MAG-OX) tablet 400 mg  400 mg Oral BID    metFORMIN (GLUCOPHAGE) tablet 500 mg  500 mg Oral BID WITH MEALS    acetaminophen (TYLENOL) tablet 650 mg  650 mg Oral Q4H PRN    diphenhydrAMINE (BENADRYL) capsule 25 mg  25 mg Oral Q6H PRN    acetaminophen (TYLENOL) tablet 650 mg  650 mg Oral Q8H    lip protectant (BLISTEX) ointment 1 Each  1 Each Topical PRN    metoprolol succinate (TOPROL-XL) XL tablet 25 mg  25 mg Oral DAILY    polyethylene glycol (MIRALAX) packet 17 g  17 g Oral DAILY PRN    guaiFENesin (ROBITUSSIN) 100 mg/5 mL oral liquid 100 mg  100 mg Oral Q4H PRN    hydrALAZINE (APRESOLINE) 20 mg/mL injection 20 mg  20 mg IntraVENous Q4H PRN    atorvastatin (LIPITOR) tablet 80 mg  80 mg Oral QHS    lisinopril (PRINIVIL, ZESTRIL) tablet 40 mg  40 mg Oral DAILY    sodium chloride (NS) flush 5-40 mL  5-40 mL IntraVENous PRN    ondansetron (ZOFRAN) injection 4 mg  4 mg IntraVENous Q4H PRN    aspirin chewable tablet 81 mg  81 mg Oral DAILY    enoxaparin (LOVENOX) injection 40 mg  40 mg SubCUTAneous Q24H    dextrose 40% (GLUTOSE) oral gel 1 Tube  15 g Oral PRN    glucagon (GLUCAGEN) injection 1 mg  1 mg IntraMUSCular PRN    dextrose (D50W) injection syrg 12.5-25 g  25-50 mL IntraVENous PRN       Diet:  DIET NUTRITIONAL SUPPLEMENTS  DIET DIABETIC CONSISTENT CARB    Other Studies (last 24 hours):  No results found.    Assessment and Plan:     Hospital Problems as of 5/26/2020 Date Reviewed: 3/3/2017          Codes Class Noted - Resolved POA    Hypomagnesemia ICD-10-CM: E83.42  ICD-9-CM: 275.2  3/7/2020 - Present Unknown        PFO (patent foramen ovale) ICD-10-CM: Q21.1  ICD-9-CM: 745.5  12/17/2019 - Present Yes        Arrhythmia ICD-10-CM: I49.9  ICD-9-CM: 427.9  12/15/2019 - Present Yes        Hyperlipemia ICD-10-CM: E78.5  ICD-9-CM: 272.4  12/15/2019 - Present Yes        * (Principal) Acute embolic stroke ([facility] [address] [id number]: I63.9  ICD-9-CM: 434.11  12/12/2019 - Present Yes        Hypertension (Chronic) ICD-10-CM: I10  ICD-9-CM: 401.9  Unknown - Present Yes        Type 2 diabetes mellitus (HCC) (Chronic) ICD-10-CM: E11.9  ICD-9-CM: 250.00  Unknown - Present Yes              A/P:    Acute embolic stroke  MRI brain showed acute to early subacute infarcts in the left cerebellar hemisphere, in the periventricular deep left frontoparietal white matter and likely additional areas of restricted diffusion in the right paramedian yariel. +PFO on echo  On ASA, statin  Per PT recommendations, orthotist consult for AFO for L LE. Will need shunt closure once discharged-  4/30-I spoke with cardiology, they state PFO closure can wait until patient discharged to a facility.     Hypomagnesemia  Resolved       Hypertension  Continue metoprolol and ACE inhibitor  Goal BP <130/80     Type 2 diabetes mellitus:    Monitor, BGL controlled        Signed:  Hunter Monet NP

## 2020-05-26 NOTE — PROGRESS NOTES
Problem: Mobility Impaired (Adult and Pediatric)  Goal: *Acute Goals and Plan of Care  Description  GOALS REVIEWED ON 5/21/2020 (BOLD INDICATES MOST RECENTLY UPDATED GOAL):  1. Patient will perform bed mobility with MODIFIED INDEPENDENCE and 0 verbal cues within 7 treatment days. PROGRESSING; CURRENTLY AT SUPERVISION  2. Patient will perform transfer bed to chair/wheelchair with SUPERVISION within 7 treatment days. PROGRESSING; CURRENTLY AT CGA  3. Patient will demonstrate FAIR+ dynamic balance throughout ambulation within 7 treatment days. PROGRESSING; CURRENTLY AT FAIR  4. Patient demonstrate 3+/5 strength in L LE hip flexion, hip abduction, hip adduction, and knee extension (quad strength) within 7 treatment days. PROGRESSING SEE STRENGTH ASSESSMENT BELOW  5. Patient will ambulate 300ft+ with STAND BY ASSIST and least retrictive assistive device and L LE AFO within 7 treatment days. PROGRESSING CURRENTLY AT CGA FOR 300FT  6. Patient will perform wheelchair mobility 75ft with SUPERVISION and 0 verbal cues within 7 treatment days. PROGRESSING CURRENTLY REQUIRING CGA FOR 50 FT  7. Patient will don/doff LUE sling for protection of L shoulder during transfers/gait with set up within 7 treatment days. PROGRESSING REQUIRING MIN-MODERATE ASSISTANCE  8. Patient will instruct caregiver how to don/doff L LE AFO with independence within 7 treatment days. 9. Patient will tolerate out of bed mobility 4 hours daily to address dynamic sitting balance, promote anterior pelvic tilt, and trunk/core strengthening within 7 treatment days.         PHYSICAL THERAPY: Daily Note and PM 5/26/2020  INPATIENT: PT Visit Days : 4  Payor: [id number] / Plan: [place] / Product Type: Medicaid /       NAME/AGE/GENDER: Pancho Chin is a 53 y.o. male   PRIMARY DIAGNOSIS: Acute ischemic stroke ([address] [I63.9]  Cerebrovascular accident (CVA) due to embolism ([address] [V57.5] Acute embolic stroke ([address] Acute embolic stroke [facility])       ICD-10: Treatment Diagnosis:   · Difficulty in walking, Not elsewhere classified (R26.2)  · Hemiplegia and hemiparesis following cerebral infarction affecting left non-dominant side (N05.094)   Precaution/Allergies:  Patient has no known allergies. ASSESSMENT:     Mr. Henriquez is a 53year old admitted R MCA CVA. Patient was supine upon contact and agreeable to PT. Patient performs supine to sit with mod I. Sits EOB with good static sitting balance while therapist donned socks, brace, and shoes with total assist. Patient transfers to standing with CGA. Once standing Patient ambulated 450' with luke walker, CGA, and cues for gait sequencing. Patient returned to his room where he returns to supine with supervision. Overall slow progress towards physical therapy goals. Patient's goals listed above are still appropriate. Will continue skilled PT to address remaining deficits. This section established at most recent assessment   PROBLEM LIST (Impairments causing functional limitations):  1. Decreased Strength  2. Decreased ADL/Functional Activities  3. Decreased Transfer Abilities  4. Decreased Ambulation Ability/Technique  5. Decreased Balance  6. Increased Pain  7. Decreased Activity Tolerance  8. Increased Fatigue  9. Decreased Flexibility/Joint Mobility   INTERVENTIONS PLANNED: (Benefits and precautions of physical therapy have been discussed with the patient.)  1. Balance Exercise  2. Bed Mobility  3. Family Education  4. Gait Training  5. Home Exercise Program (HEP)  6. Manual Therapy  7. Neuromuscular Re-education/Strengthening  8. Range of Motion (ROM)  9. Therapeutic Activites  10. Therapeutic Exercise/Strengthening  11. Transfer Training     TREATMENT PLAN: Frequency/Duration: 5 times a week for duration of hospital stay  Rehabilitation Potential For Stated Goals: Good     REHAB RECOMMENDATIONS (at time of discharge pending progress):    Placement:   It is my opinion, based on this patient's performance to date, that Mr. Juvencio Saavedra may benefit from intensive therapy at an [facility] after discharge due to a probable need for close medical supervision by a rehab physician, a probable need for multiple therapy disciplines and potential to make ongoing and sustainable functional improvement that is of practical value. .  Equipment:    TBD pending progress with therapy. HISTORY:   History of Present Injury/Illness (Reason for Referral):  Per H&P: \"Pt is a 54 y/o smoker with DM, HTN, who presented to ER with L leg and arm weakness, L facial droop, dysarthria. First noted L leg weakness late night 12/11 when he woke up to go to the bathroom. He was normal when he went to bed around 1030. Woke up this morning and had persistent weakness L leg and also now noted in L arm. EMS called. Noted to have slurred speech and L facial droop as well. Code S called in ER around 9am.  MRI with acute infarcts in L cerebellar hemisphere, deep frontoparietal white matter, and R paramedian yariel. Also noted old lacunar infarcts. No large vessel occlusion on CTA, but some stenosis noted. No hx afib, TIA, CVA. No CP, palpitations, SOB. \"  Past Medical History/Comorbidities:   Mr. Juvencio Saavedra  has a past medical history of Acute ischemic stroke ([address] (12/12/2019), Acute pancreatitis (11/19/2014), Cerebrovascular accident (CVA) due to embolism ([address] (12/12/2019), Diabetes ([address] (2002), Diabetes ([address] Diabetes mellitus, and Hypertension. He also has no past medical history of Arthritis, Asthma, Autoimmune disease ([address] CAD (coronary artery disease), Cancer ([address] Chronic kidney disease, COPD, Dementia, Dementia ([address] Heart failure ([address] Ill-defined condition, Infectious disease, Liver disease, Other ill-defined conditions(799.89), Psychiatric disorder, PUD (peptic ulcer disease), Seizures ([address] or Sleep disorder.   Mr. Juvencio Saavedra  has a past surgical history that includes hx hernia repair and hx orthopaedic. Social History/Living Environment:   Home Environment: Apartment  # Steps to Enter: 12  Rails to Enter: Yes  [employer] : Bilateral  One/Two Story Residence: One story  Living Alone: No  Support Systems: Spouse/Significant Other/Partner  Patient Expects to be Discharged to[de-identified] Unknown  Current DME Used/Available at Home: None  Tub or Shower Type: Tub/Shower combination  Prior Level of Function/Work/Activity:  Independent, lives with wife in 2nd story 1 level apartment. No recent falls. Number of Personal Factors/Comorbidities that affect the Plan of Care: 1-2: MODERATE COMPLEXITY   EXAMINATION:   Most Recent Physical Functioning:   Gross Assessment: left hip grossly 2/5 to 3-/5                       Balance:  Sitting: Impaired  Sitting - Static: Good (unsupported)  Sitting - Dynamic: Fair (occasional)  Standing: Impaired  Standing - Static: Good  Standing - Dynamic : Fair Bed Mobility:  Supine to Sit: Modified independent  Wheelchair Mobility: NT     Transfers:  Sit to Stand: Contact guard assistance  Stand to Sit: Contact guard assistance  Gait:     Base of Support: Center of gravity altered  Speed/Clotilde: Slow  Gait Abnormalities: Hemiplegic  Distance (ft): 450 Feet (ft)  Assistive Device: Walker luke  Ambulation - Level of Assistance: Contact guard assistance;Stand-by assistance  Interventions: Safety awareness training; Tactile cues; Verbal cues      Body Structures Involved:  1. Nerves  2. Voice/Speech  3. Bones  4. Joints  5. Muscles Body Functions Affected:  1. Mental  2. Sensory/Pain  3. Neuromusculoskeletal  4. Movement Related Activities and Participation Affected:  1. General Tasks and Demands  2. Mobility  3. Self Care  4.  Interpersonal Interactions and Relationships   Number of elements that affect the Plan of Care: 4+: HIGH COMPLEXITY   CLINICAL PRESENTATION:   Presentation: Evolving clinical presentation with changing clinical characteristics: MODERATE COMPLEXITY   CLINICAL DECISION MAKING:   [facility] AM-PAC 6 Clicks   Basic Mobility Inpatient Short Form  How much difficulty does the patient currently have. .. Unable A Lot A Little None   1. Turning over in bed (including adjusting bedclothes, sheets and blankets)? [] 1   [] 2   [] 3   [x] 4   2. Sitting down on and standing up from a chair with arms ( e.g., wheelchair, bedside commode, etc.)   [] 1   [] 2   [x] 3   [] 4   3. Moving from lying on back to sitting on the side of the bed? [] 1   [] 2   [] 3   [x] 4   How much help from another person does the patient currently need. .. Total A Lot A Little None   4. Moving to and from a bed to a chair (including a wheelchair)? [] 1   [] 2   [x] 3   [] 4   5. Need to walk in hospital room? [] 1   [] 2   [x] 3   [] 4   6. Climbing 3-5 steps with a railing? [] 1   [x] 2   [] 3   [] 4   © 2007, Trustees of [facility], under license to [employer]. All rights reserved      Score:  Initial: 13 Most Recent: 19 (Date: 5/21/2020)    Interpretation of Tool:  Represents activities that are increasingly more difficult (i.e. Bed mobility, Transfers, Gait). Medical Necessity:     · Patient is expected to demonstrate progress in   · strength, range of motion, balance, coordination, and functional technique  ·  to   · increase independence with all mobility. · .  Reason for Services/Other Comments:  · Patient continues to require skilled intervention due to   · medical complications and mobility deficits which impact his level of function, safety, and independence as indicated above. · .   Use of outcome tool(s) and clinical judgement create a POC that gives a: Questionable prediction of patient's progress: MODERATE COMPLEXITY        TREATMENT:      Pre-treatment Symptoms/Complaints: see above  Pain: Initial: 0/10 Post Session:  0/10       Therapeutic Activity: (    30 Minutes):   Therapeutic activities including bed mobility training, transfer training from various surface heights, ambulation on level ground, static/dynamic standing balance, posture training, instruction in sequencing with luke walker and gait mechanics, and patient education to improve mobility, strength and balance. Required moderate Safety awareness training; Tactile cues; Verbal cues to promote static and dynamic balance in standing, promote coordination of bilateral, lower extremity(s) and promote motor control of bilateral, lower extremity(s). Braces/Orthotics/Lines/Etc:   · L LE AFO  Treatment/Session Assessment:    · Response to Treatment:  See above  · Interdisciplinary Collaboration:   o Physical Therapy Assistant  o Registered Nurse  o Rehabilitation Attendant  · After treatment position/precautions:   o Supine in bed  o Bed alarm/tab alert on  o Bed/Chair-wheels locked  o Bed in low position  o Call light within reach  o RN notified   · Compliance with Program/Exercises: Compliant all of the time  · Recommendations/Intent for next treatment session: \"Next visit will focus on advancements to more challenging activities and reduction in assistance provided\".     Total Treatment Duration:  PT Patient Time In/Time Out  Time In: 1307  Time Out: [address]

## 2020-05-27 PROCEDURE — 74011250637 HC RX REV CODE- 250/637: Performed by: HOSPITALIST

## 2020-05-27 PROCEDURE — 65270000029 HC RM PRIVATE

## 2020-05-27 PROCEDURE — 97530 THERAPEUTIC ACTIVITIES: CPT

## 2020-05-27 PROCEDURE — 74011250637 HC RX REV CODE- 250/637: Performed by: NURSE PRACTITIONER

## 2020-05-27 PROCEDURE — 74011250637 HC RX REV CODE- 250/637: Performed by: INTERNAL MEDICINE

## 2020-05-27 PROCEDURE — 74011250636 HC RX REV CODE- 250/636: Performed by: INTERNAL MEDICINE

## 2020-05-27 RX ADMIN — ENOXAPARIN SODIUM 40 MG: 40 INJECTION SUBCUTANEOUS at 13:51

## 2020-05-27 RX ADMIN — METFORMIN HYDROCHLORIDE 500 MG: 500 TABLET ORAL at 17:21

## 2020-05-27 RX ADMIN — Medication 400 MG: at 09:04

## 2020-05-27 RX ADMIN — ASPIRIN 81 MG 81 MG: 81 TABLET ORAL at 09:03

## 2020-05-27 RX ADMIN — Medication 10 ML: at 13:51

## 2020-05-27 RX ADMIN — LISINOPRIL 40 MG: 20 TABLET ORAL at 09:04

## 2020-05-27 RX ADMIN — METOPROLOL SUCCINATE 25 MG: 25 TABLET, FILM COATED, EXTENDED RELEASE ORAL at 09:04

## 2020-05-27 RX ADMIN — DIPHENHYDRAMINE HYDROCHLORIDE 25 MG: 25 CAPSULE ORAL at 17:35

## 2020-05-27 RX ADMIN — ACETAMINOPHEN 650 MG: 325 TABLET, FILM COATED ORAL at 21:50

## 2020-05-27 RX ADMIN — ACETAMINOPHEN 650 MG: 325 TABLET, FILM COATED ORAL at 13:51

## 2020-05-27 RX ADMIN — ATORVASTATIN CALCIUM 80 MG: 80 TABLET, FILM COATED ORAL at 21:50

## 2020-05-27 RX ADMIN — Medication 400 MG: at 17:21

## 2020-05-27 RX ADMIN — GUAIFENESIN 100 MG: 100 SOLUTION ORAL at 17:35

## 2020-05-27 RX ADMIN — METFORMIN HYDROCHLORIDE 500 MG: 500 TABLET ORAL at 09:04

## 2020-05-27 RX ADMIN — ACETAMINOPHEN 650 MG: 325 TABLET, FILM COATED ORAL at 05:17

## 2020-05-27 NOTE — PROGRESS NOTES
Problem: Mobility Impaired (Adult and Pediatric)  Goal: *Acute Goals and Plan of Care  Description  GOALS REVIEWED ON 5/21/2020 (BOLD INDICATES MOST RECENTLY UPDATED GOAL):  1. Patient will perform bed mobility with MODIFIED INDEPENDENCE and 0 verbal cues within 7 treatment days. PROGRESSING; CURRENTLY AT SUPERVISION  2. Patient will perform transfer bed to chair/wheelchair with SUPERVISION within 7 treatment days. PROGRESSING; CURRENTLY AT CGA  3. Patient will demonstrate FAIR+ dynamic balance throughout ambulation within 7 treatment days. PROGRESSING; CURRENTLY AT FAIR  4. Patient demonstrate 3+/5 strength in L LE hip flexion, hip abduction, hip adduction, and knee extension (quad strength) within 7 treatment days. PROGRESSING SEE STRENGTH ASSESSMENT BELOW  5. Patient will ambulate 300ft+ with STAND BY ASSIST and least retrictive assistive device and L LE AFO within 7 treatment days. PROGRESSING CURRENTLY AT CGA FOR 300FT  6. Patient will perform wheelchair mobility 75ft with SUPERVISION and 0 verbal cues within 7 treatment days. PROGRESSING CURRENTLY REQUIRING CGA FOR 50 FT  7. Patient will don/doff LUE sling for protection of L shoulder during transfers/gait with set up within 7 treatment days. PROGRESSING REQUIRING MIN-MODERATE ASSISTANCE  8. Patient will instruct caregiver how to don/doff L LE AFO with independence within 7 treatment days. 9. Patient will tolerate out of bed mobility 4 hours daily to address dynamic sitting balance, promote anterior pelvic tilt, and trunk/core strengthening within 7 treatment days.         PHYSICAL THERAPY: Daily Note and PM 5/27/2020  INPATIENT: PT Visit Days : 5  Payor: [id number] / Plan: [place] / Product Type: Medicaid /       NAME/AGE/GENDER: Sandra Allen is a 53 y.o. male   PRIMARY DIAGNOSIS: Acute ischemic stroke ([address] [I63.9]  Cerebrovascular accident (CVA) due to embolism ([address] [F55.7] Acute embolic stroke ([address] Acute embolic stroke [facility])       ICD-10: Treatment Diagnosis:   · Difficulty in walking, Not elsewhere classified (R26.2)  · Hemiplegia and hemiparesis following cerebral infarction affecting left non-dominant side (K15.525)   Precaution/Allergies:  Patient has no known allergies. ASSESSMENT:     Mr. Henriquez is a 53year old admitted R MCA CVA. Patient was supine upon contact and agreeable to PT. Patient reports being tired today needing increased assistance for bed mobility and transfers needing min assist and cues for improved technique to complete both tasks. Therapist donned socks, brace, and shoes with total assist. Once standing patient ambulated 450' with luke walker, CGA-SBA, and cues for gait sequencing. Patient returned to his room where he returns to supine with supervision. Overall slow progress towards physical therapy goals although patient has ambulated 450' two days in a row with CGA-SBA addressing goal #5. Patient's goals listed above are still appropriate. Will continue skilled PT to address remaining deficits. This section established at most recent assessment   PROBLEM LIST (Impairments causing functional limitations):  1. Decreased Strength  2. Decreased ADL/Functional Activities  3. Decreased Transfer Abilities  4. Decreased Ambulation Ability/Technique  5. Decreased Balance  6. Increased Pain  7. Decreased Activity Tolerance  8. Increased Fatigue  9. Decreased Flexibility/Joint Mobility   INTERVENTIONS PLANNED: (Benefits and precautions of physical therapy have been discussed with the patient.)  1. Balance Exercise  2. Bed Mobility  3. Family Education  4. Gait Training  5. Home Exercise Program (HEP)  6. Manual Therapy  7. Neuromuscular Re-education/Strengthening  8. Range of Motion (ROM)  9. Therapeutic Activites  10. Therapeutic Exercise/Strengthening  11.  Transfer Training     TREATMENT PLAN: Frequency/Duration: 5 times a week for duration of hospital stay  Rehabilitation Potential For Stated Goals: Good     REHAB RECOMMENDATIONS (at time of discharge pending progress):    Placement: It is my opinion, based on this patient's performance to date, that Mr. Naun Hampton may benefit from intensive therapy at an [facility] after discharge due to a probable need for close medical supervision by a rehab physician, a probable need for multiple therapy disciplines and potential to make ongoing and sustainable functional improvement that is of practical value. .  Equipment:    TBD pending progress with therapy. HISTORY:   History of Present Injury/Illness (Reason for Referral):  Per H&P: \"Pt is a 52 y/o smoker with DM, HTN, who presented to ER with L leg and arm weakness, L facial droop, dysarthria. First noted L leg weakness late night 12/11 when he woke up to go to the bathroom. He was normal when he went to bed around 1030. Woke up this morning and had persistent weakness L leg and also now noted in L arm. EMS called. Noted to have slurred speech and L facial droop as well. Code S called in ER around 9am.  MRI with acute infarcts in L cerebellar hemisphere, deep frontoparietal white matter, and R paramedian yariel. Also noted old lacunar infarcts. No large vessel occlusion on CTA, but some stenosis noted. No hx afib, TIA, CVA. No CP, palpitations, SOB. \"  Past Medical History/Comorbidities:   Mr. Naun Hampton  has a past medical history of Acute ischemic stroke ([address] (12/12/2019), Acute pancreatitis (11/19/2014), Cerebrovascular accident (CVA) due to embolism ([address] (12/12/2019), Diabetes ([address] (2002), Diabetes ([address] Diabetes mellitus, and Hypertension.  He also has no past medical history of Arthritis, Asthma, Autoimmune disease ([address] CAD (coronary artery disease), Cancer ([address] Chronic kidney disease, COPD, Dementia, Dementia ([address] Heart failure ([address] Ill-defined condition, Infectious disease, Liver disease, Other ill-defined conditions(799.89), Psychiatric disorder, PUD (peptic ulcer disease), Seizures ([facility] or Sleep disorder. Mr. Shanell Cueva  has a past surgical history that includes hx hernia repair and hx orthopaedic. Social History/Living Environment:   Home Environment: Apartment  # Steps to Enter: 12  Rails to Enter: Yes  [employer] : Bilateral  One/Two Story Residence: One story  Living Alone: No  Support Systems: Spouse/Significant Other/Partner  Patient Expects to be Discharged to[de-identified] Unknown  Current DME Used/Available at Home: None  Tub or Shower Type: Tub/Shower combination  Prior Level of Function/Work/Activity:  Independent, lives with wife in 2nd story 1 level apartment. No recent falls. Number of Personal Factors/Comorbidities that affect the Plan of Care: 1-2: MODERATE COMPLEXITY   EXAMINATION:   Most Recent Physical Functioning:   Gross Assessment: left hip grossly 2/5 to 3-/5                       Balance:  Sitting: Impaired  Sitting - Static: Good (unsupported)  Sitting - Dynamic: Fair (occasional)  Standing: Impaired  Standing - Static: Good  Standing - Dynamic : Fair Bed Mobility:  Supine to Sit: Minimum assistance  Sit to Supine: Supervision  Wheelchair Mobility: NT     Transfers:  Sit to Stand: Minimum assistance  Stand to Sit: Contact guard assistance  Gait:     Base of Support: Center of gravity altered  Speed/Clotilde: Slow  Gait Abnormalities: Hemiplegic  Distance (ft): 450 Feet (ft)  Assistive Device: Walker luke  Ambulation - Level of Assistance: Contact guard assistance;Stand-by assistance  Interventions: Safety awareness training; Tactile cues; Verbal cues      Body Structures Involved:  1. Nerves  2. Voice/Speech  3. Bones  4. Joints  5. Muscles Body Functions Affected:  1. Mental  2. Sensory/Pain  3. Neuromusculoskeletal  4. Movement Related Activities and Participation Affected:  1. General Tasks and Demands  2. Mobility  3. Self Care  4.  Interpersonal Interactions and Relationships   Number of elements that affect the Plan of Care: 4+: HIGH COMPLEXITY   CLINICAL PRESENTATION:   Presentation: Evolving clinical presentation with changing clinical characteristics: MODERATE COMPLEXITY   CLINICAL DECISION MAKIN [facility] Mobility Inpatient Short Form  How much difficulty does the patient currently have. .. Unable A Lot A Little None   1. Turning over in bed (including adjusting bedclothes, sheets and blankets)? [] 1   [] 2   [] 3   [x] 4   2. Sitting down on and standing up from a chair with arms ( e.g., wheelchair, bedside commode, etc.)   [] 1   [] 2   [x] 3   [] 4   3. Moving from lying on back to sitting on the side of the bed? [] 1   [] 2   [] 3   [x] 4   How much help from another person does the patient currently need. .. Total A Lot A Little None   4. Moving to and from a bed to a chair (including a wheelchair)? [] 1   [] 2   [x] 3   [] 4   5. Need to walk in hospital room? [] 1   [] 2   [x] 3   [] 4   6. Climbing 3-5 steps with a railing? [] 1   [x] 2   [] 3   [] 4   © , Trustees of [facility] [employer], under license to [employer]. All rights reserved      Score:  Initial: 13 Most Recent: 19 (Date: 2020)    Interpretation of Tool:  Represents activities that are increasingly more difficult (i.e. Bed mobility, Transfers, Gait). Medical Necessity:     · Patient is expected to demonstrate progress in   · strength, range of motion, balance, coordination, and functional technique  ·  to   · increase independence with all mobility. · .  Reason for Services/Other Comments:  · Patient continues to require skilled intervention due to   · medical complications and mobility deficits which impact his level of function, safety, and independence as indicated above.    · .   Use of outcome tool(s) and clinical judgement create a POC that gives a: Questionable prediction of patient's progress: MODERATE COMPLEXITY        TREATMENT:      Pre-treatment Symptoms/Complaints: see above  Pain: Initial: 0/10 Post Session:  0/10       Therapeutic Activity: (    30 Minutes): Therapeutic activities including bed mobility training, transfer training from various surface heights, ambulation on level ground, static/dynamic standing balance, posture training, instruction in sequencing with luke walker and gait mechanics, and patient education to improve mobility, strength and balance. Required moderate Safety awareness training; Tactile cues; Verbal cues to promote static and dynamic balance in standing, promote coordination of bilateral, lower extremity(s) and promote motor control of bilateral, lower extremity(s). Braces/Orthotics/Lines/Etc:   · L LE AFO  Treatment/Session Assessment:    · Response to Treatment:  See above  · Interdisciplinary Collaboration:   o Physical Therapy Assistant  o Registered Nurse  o Rehabilitation Attendant  · After treatment position/precautions:   o Supine in bed  o Bed alarm/tab alert on  o Bed/Chair-wheels locked  o Bed in low position  o Call light within reach  o RN notified   · Compliance with Program/Exercises: Compliant all of the time  · Recommendations/Intent for next treatment session: \"Next visit will focus on advancements to more challenging activities and reduction in assistance provided\".     Total Treatment Duration:  PT Patient Time In/Time Out  Time In: 1355  Time Out: [address], TODD

## 2020-05-27 NOTE — PROGRESS NOTES
Problem: Patient Education: Go to Patient Education Activity  Goal: Patient/Family Education  Outcome: Progressing Towards Goal     Problem: Pressure Injury - Risk of  Goal: *Prevention of pressure injury  Description: Document Dewey Scale and appropriate interventions in the flowsheet. Outcome: Progressing Towards Goal  Note: Pressure Injury Interventions:  Sensory Interventions: Assess changes in LOC, Keep linens dry and wrinkle-free, Minimize linen layers, Pressure redistribution bed/mattress (bed type)    Moisture Interventions: Absorbent underpads, Check for incontinence Q2 hours and as needed, Limit adult briefs, Minimize layers    Activity Interventions: Increase time out of bed, Pressure redistribution bed/mattress(bed type), PT/OT evaluation    Mobility Interventions: HOB 30 degrees or less, Pressure redistribution bed/mattress (bed type), PT/OT evaluation    Nutrition Interventions: Document food/fluid/supplement intake    Friction and Shear Interventions: Apply protective barrier, creams and emollients, HOB 30 degrees or less                Problem: Patient Education: Go to Patient Education Activity  Goal: Patient/Family Education  Outcome: Progressing Towards Goal     Problem: Falls - Risk of  Goal: *Absence of Falls  Description: Document Jeanne Fall Risk and appropriate interventions in the flowsheet.   Outcome: Progressing Towards Goal  Note: Fall Risk Interventions:  Mobility Interventions: Bed/chair exit alarm, Communicate number of staff needed for ambulation/transfer, OT consult for ADLs, Patient to call before getting OOB, PT Consult for mobility concerns    Mentation Interventions: Adequate sleep, hydration, pain control, Bed/chair exit alarm, Door open when patient unattended    Medication Interventions: Bed/chair exit alarm, Patient to call before getting OOB, Teach patient to arise slowly    Elimination Interventions: Bed/chair exit alarm, Call light in reach, Patient to call for help with toileting needs, Stay With Me (per policy), Toilet paper/wipes in reach, Toileting schedule/hourly rounds    History of Falls Interventions: Bed/chair exit alarm, Door open when patient unattended, Evaluate medications/consider consulting pharmacy, Investigate reason for fall         Problem: Patient Education: Go to Patient Education Activity  Goal: Patient/Family Education  Outcome: Progressing Towards Goal     Problem: Diabetes Self-Management  Goal: *Disease process and treatment process  Description: Define diabetes and identify own type of diabetes; list 3 options for treating diabetes. Outcome: Progressing Towards Goal  Goal: *Incorporating nutritional management into lifestyle  Description: Describe effect of type, amount and timing of food on blood glucose; list 3 methods for planning meals. Outcome: Progressing Towards Goal  Goal: *Incorporating physical activity into lifestyle  Description: State effect of exercise on blood glucose levels. Outcome: Progressing Towards Goal  Goal: *Developing strategies to promote health/change behavior  Description: Define the ABC's of diabetes; identify appropriate screenings, schedule and personal plan for screenings. Outcome: Progressing Towards Goal  Goal: *Using medications safely  Description: State effect of diabetes medications on diabetes; name diabetes medication taking, action and side effects. Outcome: Progressing Towards Goal  Goal: *Monitoring blood glucose, interpreting and using results  Description: Identify recommended blood glucose targets  and personal targets. Outcome: Progressing Towards Goal  Goal: *Prevention, detection, treatment of acute complications  Description: List symptoms of hyper- and hypoglycemia; describe how to treat low blood sugar and actions for lowering  high blood glucose level.   Outcome: Progressing Towards Goal  Goal: *Prevention, detection and treatment of chronic complications  Description: Define the natural course of diabetes and describe the relationship of blood glucose levels to long term complications of diabetes.   Outcome: Progressing Towards Goal  Goal: *Developing strategies to address psychosocial issues  Description: Describe feelings about living with diabetes; identify support needed and support network  Outcome: Progressing Towards Goal     Problem: Patient Education: Go to Patient Education Activity  Goal: Patient/Family Education  Outcome: Progressing Towards Goal     Problem: Patient Education: Go to Patient Education Activity  Goal: Patient/Family Education  Outcome: Progressing Towards Goal     Problem: Nutrition Deficit  Goal: *Optimize nutritional status  Outcome: Progressing Towards Goal     Problem: Patient Education: Go to Patient Education Activity  Goal: Patient/Family Education  Outcome: Progressing Towards Goal     Problem: General Medical Care Plan  Goal: *Vital signs within specified parameters  Outcome: Progressing Towards Goal  Goal: *Labs within defined limits  Outcome: Progressing Towards Goal  Goal: *Absence of infection signs and symptoms  Description: Wash hand more often   Outcome: Progressing Towards Goal  Goal: *Optimal pain control at patient's stated goal  Outcome: Progressing Towards Goal  Goal: *Skin integrity maintained  Outcome: Progressing Towards Goal  Goal: *Fluid volume balance  Outcome: Progressing Towards Goal  Goal: *Optimize nutritional status  Outcome: Progressing Towards Goal  Goal: *Anxiety reduced or absent  Outcome: Progressing Towards Goal  Goal: *Progressive mobility and function (eg: ADL's)  Outcome: Progressing Towards Goal     Problem: Patient Education: Go to Patient Education Activity  Goal: Patient/Family Education  Outcome: Progressing Towards Goal     Problem: Patient Education: Go to Patient Education Activity  Goal: Patient/Family Education  Outcome: Progressing Towards Goal     Problem: Patient Education: Go to Patient Education Activity  Goal: Patient/Family Education  Outcome: Progressing Towards Goal     Problem: Patient Education: Go to Patient Education Activity  Goal: Patient/Family Education  Outcome: Progressing Towards Goal     Problem: Ischemic Stroke: Discharge Outcomes  Goal: *Verbalizes anxiety and depression are reduced or absent  Outcome: Progressing Towards Goal  Goal: *Verbalize understanding of risk factor modification(Stroke Metric)  Outcome: Progressing Towards Goal  Goal: *Hemodynamically stable  Outcome: Progressing Towards Goal  Goal: *Absence of aspiration pneumonia  Outcome: Progressing Towards Goal  Goal: *Aware of needed dietary changes  Outcome: Progressing Towards Goal  Goal: *Verbalize understanding of prescribed medications including anti-coagulants, anti-lipid, and/or anti-platelets(Stroke Metric)  Outcome: Progressing Towards Goal  Goal: *Tolerating diet  Outcome: Progressing Towards Goal  Goal: *Aware of follow-up diagnostics related to anticoagulants  Outcome: Progressing Towards Goal  Goal: *Ability to perform ADLs and demonstrates progressive mobility and function  Outcome: Progressing Towards Goal  Goal: *Absence of DVT(Stroke Metric)  Outcome: Progressing Towards Goal  Goal: *Absence of aspiration  Outcome: Progressing Towards Goal  Goal: *Optimal pain control at patient's stated goal  Outcome: Progressing Towards Goal  Goal: *Home safety concerns addressed  Outcome: Progressing Towards Goal  Goal: *Describes available resources and support systems  Outcome: Progressing Towards Goal  Goal: *Verbalizes understanding of activation of EMS(911) for stroke symptoms(Stroke Metric)  Outcome: Progressing Towards Goal  Goal: *Understands and describes signs and symptoms to report to providers(Stroke Metric)  Outcome: Progressing Towards Goal  Goal: *Neurolgocially stable (absence of additional neurological deficits)  Outcome: Progressing Towards Goal  Goal: *Verbalizes importance of follow-up with primary care physician(Stroke Metric)  Outcome: Progressing Towards Goal  Goal: *Smoking cessation discussed,if applicable(Stroke Metric)  Outcome: Progressing Towards Goal  Goal: *Depression screening completed(Stroke Metric)  Outcome: Progressing Towards Goal     Problem: Pain  Goal: *Control of Pain  Outcome: Progressing Towards Goal     Problem: Patient Education: Go to Patient Education Activity  Goal: Patient/Family Education  Outcome: Progressing Towards Goal     Problem: Patient Education: Go to Patient Education Activity  Goal: Patient/Family Education  Outcome: Progressing Towards Goal     Problem: Patient Education: Go to Patient Education Activity  Goal: Patient/Family Education  Outcome: Progressing Towards Goal

## 2020-05-27 NOTE — PROGRESS NOTES
JUNI spoke with Ivonne with [employer] (# and she confirmed that the pt has been approved for disability and will not qualify for traditional medicaid that will cover post-acute services and SNF placement. She stated she would send this writer a copy of the pt's award letter via secure email. SW will resend the referral to [facility] and initiate the  Medicaid level of care application. JUNI has also requested that the physiatrist review the pt's chart for a possible acute rehab admission prior to transfer to a SNF. SW following.

## 2020-05-27 NOTE — PROGRESS NOTES
Follow-up visit attempted. Mr. Teresa Christine has stated in the past that I can wake him from sleep in order to visit him. Mr. Teresa Christine normally sleeps during the day. Today was the first time he declined a visit due to sleep. Chaplains remain available for follow-up.      Dontrell Mercer 66  Board Certified

## 2020-05-27 NOTE — PROGRESS NOTES
Progress Note    2020  Admit Date: 2019  9:07 AM   NAME: Pancho Chin   :  1975   MRN:  [id number]   Attending: José Manuel Brewster DO  PCP:  Fernando Romero MD  Treatment Team: Attending Provider: José Manuel Brewster DO; Care Manager: Chelo Mace LMSW; Utilization Review: Akira Palacios RN; Nurse Practitioner: Chichi Romo NP; Hospitalist: Baldemar Winn NP; Utilization Review: Beka Guaman RN; Nurse Practitioner: Mehrdad Castrejon NP; Charge Nurse: Foster Rai; Physical Therapy Assistant: Pattie Palacios PTA    Full Code   SUBJECTIVE:   Mr. Kathleen Jensen is a 52 yo male with PMH of DM, HTN who presented with c/o left sided weakness and left facial droop 19.   CT head showed age indeterminate infarctions within the left corona radiata/caudate.  CTA head/neck showed stenosis at the distal segment of the right vertebral artery and multifocal stenosis in the P2 segment of the left posterior cerebral artery. MRI brain showed acute to early subacute infarcts in the left cerebellar hemisphere, in the periventricular deep left frontoparietal white matter and likely additional areas of restricted diffusion in the right paramedian yariel.   Echo +PFO.  On statin, ASA.  Has been difficult to place in STR. Plans for 4 week event monitor on DC and if no arrhythmia PFO closure recommended. Pt remains stable today. Awaiting placement, medicaid pending. Denies complaints. Progressing well with PT. Past Medical History:   Diagnosis Date    Acute ischemic stroke ([address] 2019    Acute pancreatitis 2014    Cerebrovascular accident (CVA) due to embolism ([address] 2019    [facility] 2002    Diabetes ([facility]     Diabetes mellitus     Hypertension      No results found for this or any previous visit (from the past 25 hour(s)).   No Known Allergies  Current Facility-Administered Medications   Medication Dose Route Frequency Provider Last Rate Last Dose    magnesium oxide (MAG-OX) tablet 400 mg  400 mg Oral BID Tea Gary, NP   400 mg at 05/27/20 3006    metFORMIN (GLUCOPHAGE) tablet 500 mg  500 mg Oral BID WITH MEALS Madelin Serrano NP   500 mg at 05/27/20 6144    acetaminophen (TYLENOL) tablet 650 mg  650 mg Oral Q4H PRN Sohail Acuna MD   650 mg at 05/04/20 9773    diphenhydrAMINE (BENADRYL) capsule 25 mg  25 mg Oral Q6H PRN Malissa Penn MD   25 mg at 05/26/20 1655    acetaminophen (TYLENOL) tablet 650 mg  650 mg Oral Bianca Ford MD   650 mg at 05/27/20 1351    lip protectant (BLISTEX) ointment 1 Each  1 Each Topical PRN Payal Florence MD        metoprolol succinate (TOPROL-XL) XL tablet 25 mg  25 mg Oral DAILY Sohail Po, MD   25 mg at 05/27/20 0904    polyethylene glycol (MIRALAX) packet 17 g  17 g Oral DAILY PRN Rio Carbshanta C, DO   17 g at 05/11/20 0539    guaiFENesin (ROBITUSSIN) 100 mg/5 mL oral liquid 100 mg  100 mg Oral Q4H PRN Sohail Acuna MD   100 mg at 05/26/20 1655    hydrALAZINE (APRESOLINE) 20 mg/mL injection 20 mg  20 mg IntraVENous Q4H PRN Al Resendiz MD   20 mg at 04/17/20 0510    atorvastatin (LIPITOR) tablet 80 mg  80 mg Oral QHS Al Resendiz MD   80 mg at 05/26/20 2134    lisinopril (PRINIVIL, ZESTRIL) tablet 40 mg  40 mg Oral DAILY Al Resendiz MD   40 mg at 05/27/20 4438    sodium chloride (NS) flush 5-40 mL  5-40 mL IntraVENous PRN Al Resendiz MD   10 mL at 05/27/20 1351    ondansetron (ZOFRAN) injection 4 mg  4 mg IntraVENous Q4H PRN Al Resendiz MD        aspirin chewable tablet 81 mg  81 mg Oral DAILY Al Resendiz MD   81 mg at 05/27/20 9830    enoxaparin (LOVENOX) injection 40 mg  40 mg SubCUTAneous Q24H Al Resendiz MD   40 mg at 05/27/20 1351    dextrose 40% (GLUTOSE) oral gel 1 Tube  15 g Oral PRN Al Resendiz MD        glucagon [facility]) injection 1 mg  1 mg IntraMUSCular PRN Al Resendiz MD  dextrose (D50W) injection syrg 12.5-25 g  25-50 mL IntraVENous PRN Paris Huggins MD           Review of Systems negative with exception of pertinent positives noted above  PHYSICAL EXAM     Visit Vitals  /87 (BP 1 Location: Right arm, BP Patient Position: At rest)   Pulse 80   Temp 98.2 °F (36.8 °C)   Resp 17   Ht 5' 6\" (1.676 m)   Wt 79.8 kg (175 lb 14.4 oz)   SpO2 97%   BMI 28.39 kg/m²      Temp (24hrs), Av.9 °F (36.6 °C), Min:97.8 °F (36.6 °C), Max:98.2 °F (36.8 °C)    Oxygen Therapy  O2 Sat (%): 97 % (20 1118)  Pulse via Oximetry: 75 beats per minute (20 0400)  O2 Device: Room air (20 1648)    Intake/Output Summary (Last 24 hours) at 2020 1359  Last data filed at 2020 1437  Gross per 24 hour   Intake    Output 225 ml   Net -225 ml      General:       No acute distress    Lungs:          CTA bilaterally. Resp even and nonlabored  Heart:            S1S2 present without murmurs rubs gallops. RRR. No LE edema  Abdomen:    Soft, non tender, non distended. BS present  Extremities: No cyanosis. Moves LUE at elbow  Neurologic:  moves right hand and raises arm at elbow moderately, moves left arm at elbow mildly slurred speech.  PERRLA, strength 4/5 RUE/RLE,  2/5 LUE, LLE 1/5    Results summary of Diagnostic Studies/Procedures copied from within [facility] EMR:      [address] Problems    Diagnosis Date Noted    Hypomagnesemia 2020    PFO (patent foramen ovale) 2019    Arrhythmia 12/15/2019    Hyperlipemia     [facility]     Type 2 diabetes mellitus ([facility]     Hypertension      Plan:  Acute embolic stroke  MRI brain showed acute to early subacute infarcts in the left cerebellar hemisphere, in the periventricular deep left frontoparietal white matter and likely additional areas of restricted diffusion in the right paramedian yariel.    +PFO on echo  On ASA, statin   NP spoke to Cards, plans for PFO closure after DC to facility     Hypomagnesemia   on oral supplementation        Hypertension  Continue metoprolol and ACE inhibitor  Goal BP <130/80     Type 2 diabetes mellitus:    Monitor, BGL controlled   A1C 6.9      Medically stable for DC.  Awaiting Medicaid approval.         Notes, labs, VS, diagnostic testing reviewed  Time spent with pt 20 min           DVT Prophylaxis: lovenox         Plan of Care Discussed with: Supervising MD Dr. Elena Moss, care team, pt       Jas Johnson, NP

## 2020-05-28 PROCEDURE — 74011250637 HC RX REV CODE- 250/637: Performed by: INTERNAL MEDICINE

## 2020-05-28 PROCEDURE — 74011250636 HC RX REV CODE- 250/636: Performed by: INTERNAL MEDICINE

## 2020-05-28 PROCEDURE — 74011250637 HC RX REV CODE- 250/637: Performed by: NURSE PRACTITIONER

## 2020-05-28 PROCEDURE — 97530 THERAPEUTIC ACTIVITIES: CPT

## 2020-05-28 PROCEDURE — 74011250637 HC RX REV CODE- 250/637: Performed by: HOSPITALIST

## 2020-05-28 PROCEDURE — 97112 NEUROMUSCULAR REEDUCATION: CPT

## 2020-05-28 PROCEDURE — 65270000029 HC RM PRIVATE

## 2020-05-28 RX ADMIN — ATORVASTATIN CALCIUM 80 MG: 80 TABLET, FILM COATED ORAL at 22:29

## 2020-05-28 RX ADMIN — LISINOPRIL 40 MG: 20 TABLET ORAL at 08:51

## 2020-05-28 RX ADMIN — GUAIFENESIN 100 MG: 100 SOLUTION ORAL at 16:54

## 2020-05-28 RX ADMIN — METFORMIN HYDROCHLORIDE 500 MG: 500 TABLET ORAL at 08:51

## 2020-05-28 RX ADMIN — DIPHENHYDRAMINE HYDROCHLORIDE 25 MG: 25 CAPSULE ORAL at 16:53

## 2020-05-28 RX ADMIN — ACETAMINOPHEN 650 MG: 325 TABLET, FILM COATED ORAL at 14:07

## 2020-05-28 RX ADMIN — ACETAMINOPHEN 650 MG: 325 TABLET, FILM COATED ORAL at 22:29

## 2020-05-28 RX ADMIN — Medication 400 MG: at 16:55

## 2020-05-28 RX ADMIN — ASPIRIN 81 MG 81 MG: 81 TABLET ORAL at 08:51

## 2020-05-28 RX ADMIN — METFORMIN HYDROCHLORIDE 500 MG: 500 TABLET ORAL at 16:53

## 2020-05-28 RX ADMIN — ENOXAPARIN SODIUM 40 MG: 40 INJECTION SUBCUTANEOUS at 14:06

## 2020-05-28 RX ADMIN — ACETAMINOPHEN 650 MG: 325 TABLET, FILM COATED ORAL at 05:44

## 2020-05-28 RX ADMIN — METOPROLOL SUCCINATE 25 MG: 25 TABLET, FILM COATED, EXTENDED RELEASE ORAL at 08:51

## 2020-05-28 RX ADMIN — Medication 400 MG: at 08:51

## 2020-05-28 NOTE — PROGRESS NOTES
Problem: Mobility Impaired (Adult and Pediatric)  Goal: *Acute Goals and Plan of Care  Description  GOALS REVIEWED ON 5/21/2020 (BOLD INDICATES MOST RECENTLY UPDATED GOAL):  1. Patient will perform bed mobility with MODIFIED INDEPENDENCE and 0 verbal cues within 7 treatment days. PROGRESSING; CURRENTLY AT SUPERVISION  2. Patient will perform transfer bed to chair/wheelchair with SUPERVISION within 7 treatment days. PROGRESSING; CURRENTLY AT CGA  3. Patient will demonstrate FAIR+ dynamic balance throughout ambulation within 7 treatment days. PROGRESSING; CURRENTLY AT FAIR  4. Patient demonstrate 3+/5 strength in L LE hip flexion, hip abduction, hip adduction, and knee extension (quad strength) within 7 treatment days. PROGRESSING SEE STRENGTH ASSESSMENT BELOW  5. Patient will ambulate 300ft+ with STAND BY ASSIST and least retrictive assistive device and L LE AFO within 7 treatment days. PROGRESSING CURRENTLY AT CGA FOR 300FT  6. Patient will perform wheelchair mobility 75ft with SUPERVISION and 0 verbal cues within 7 treatment days. PROGRESSING CURRENTLY REQUIRING CGA FOR 50 FT  7. Patient will don/doff LUE sling for protection of L shoulder during transfers/gait with set up within 7 treatment days. PROGRESSING REQUIRING MIN-MODERATE ASSISTANCE  8. Patient will instruct caregiver how to don/doff L LE AFO with independence within 7 treatment days. 9. Patient will tolerate out of bed mobility 4 hours daily to address dynamic sitting balance, promote anterior pelvic tilt, and trunk/core strengthening within 7 treatment days.         PHYSICAL THERAPY: Daily Note and PM 5/28/2020  INPATIENT: PT Visit Days : 6  Payor: [address] [address] / Plan: [address] [place] [address] Avenue / Product Type: Medicaid /       NAME/AGE/GENDER: Gifty Johnson is a 53 y.o. male   PRIMARY DIAGNOSIS: Acute ischemic stroke ([address] [I63.9]  Cerebrovascular accident (CVA) due to embolism ([address] [O59.7] Acute embolic stroke ([address] Acute embolic stroke [facility])       ICD-10: Treatment Diagnosis:   · Difficulty in walking, Not elsewhere classified (R26.2)  · Hemiplegia and hemiparesis following cerebral infarction affecting left non-dominant side (A78.448)   Precaution/Allergies:  Patient has no known allergies. ASSESSMENT:     Mr. Henriquez is a 53year old admitted R MCA CVA. Patient was supine upon contact and agreeable to PT. Treatment session consisted of addressing above goals specifically. Patient able to perform supine to sit with mod I with no cues provided this afternoon but patient does not perform consistently with mod I occasionally needing min assist and cues. Therapist donned socks, brace, and shoes with total assist. Patient able to don LUE sling with min assist, additional time to attempt on his own and cues for technique - goal is for set up. Patient performs SPT to recliner chair with SBA - goal above is supervision. Patient then needed increased assistance to perform transfer from wheelchair needing min assist and cues for improved/technique. Once standing patient ambulated 450' with luke walker, CGA-SBA, and cues for gait sequencing. Patient has ambulated 450' 3 days in a row needing occasional CGA. Patient returned to his room where he was able to doff LUE sling independently. Patient returns to supine with mod I. Patient declined wheelchair mobility at this time. Overall slow progress towards physical therapy goals although patient has ambulated 450' two days in a row with CGA-SBA addressing goal #5. Patient's goals listed above are still appropriate. Will continue skilled PT to address remaining deficits. This section established at most recent assessment   PROBLEM LIST (Impairments causing functional limitations):  1. Decreased Strength  2. Decreased ADL/Functional Activities  3. Decreased Transfer Abilities  4. Decreased Ambulation Ability/Technique  5. Decreased Balance  6. Increased Pain  7.  Decreased Activity Tolerance  8. Increased Fatigue  9. Decreased Flexibility/Joint Mobility   INTERVENTIONS PLANNED: (Benefits and precautions of physical therapy have been discussed with the patient.)  1. Balance Exercise  2. Bed Mobility  3. Family Education  4. Gait Training  5. Home Exercise Program (HEP)  6. Manual Therapy  7. Neuromuscular Re-education/Strengthening  8. Range of Motion (ROM)  9. Therapeutic Activites  10. Therapeutic Exercise/Strengthening  11. Transfer Training     TREATMENT PLAN: Frequency/Duration: 5 times a week for duration of hospital stay  Rehabilitation Potential For Stated Goals: Good     REHAB RECOMMENDATIONS (at time of discharge pending progress):    Placement: It is my opinion, based on this patient's performance to date, that Mr. Antony Murray may benefit from intensive therapy at an [address] [place] [address] after discharge due to a probable need for close medical supervision by a rehab physician, a probable need for multiple therapy disciplines and potential to make ongoing and sustainable functional improvement that is of practical value. .  Equipment:    TBD pending progress with therapy. HISTORY:   History of Present Injury/Illness (Reason for Referral):  Per H&P: \"Pt is a 52 y/o smoker with DM, HTN, who presented to ER with L leg and arm weakness, L facial droop, dysarthria. First noted L leg weakness late night 12/11 when he woke up to go to the bathroom. He was normal when he went to bed around 1030. Woke up this morning and had persistent weakness L leg and also now noted in L arm. EMS called. Noted to have slurred speech and L facial droop as well. Code S called in ER around 9am.  MRI with acute infarcts in L cerebellar hemisphere, deep frontoparietal white matter, and R paramedian yariel. Also noted old lacunar infarcts. No large vessel occlusion on CTA, but some stenosis noted. No hx afib, TIA, CVA. No CP, palpitations, SOB. \"  Past Medical History/Comorbidities: Mr. Sherine Joshi  has a past medical history of Acute ischemic stroke ([facility] (12/12/2019), Acute pancreatitis (11/19/2014), Cerebrovascular accident (CVA) due to embolism ([address] (12/12/2019), Diabetes ([address] (2002), Diabetes ([address] Diabetes mellitus, and Hypertension. He also has no past medical history of Arthritis, Asthma, Autoimmune disease ([address] CAD (coronary artery disease), Cancer ([address] Chronic kidney disease, COPD, Dementia, Dementia ([address] Heart failure ([address] Ill-defined condition, Infectious disease, Liver disease, Other ill-defined conditions(799.89), Psychiatric disorder, PUD (peptic ulcer disease), Seizures ([address] or Sleep disorder. Mr. Sherine Joshi  has a past surgical history that includes hx hernia repair and hx orthopaedic. Social History/Living Environment:   Home Environment: Apartment  # Steps to Enter: 12  Rails to Enter: Yes  [employer] : Bilateral  One/Two Story Residence: One story  Living Alone: No  Support Systems: Spouse/Significant Other/Partner  Patient Expects to be Discharged to[de-identified] Unknown  Current DME Used/Available at Home: None  Tub or Shower Type: Tub/Shower combination  Prior Level of Function/Work/Activity:  Independent, lives with wife in 2nd story 1 level apartment. No recent falls.    Number of Personal Factors/Comorbidities that affect the Plan of Care: 1-2: MODERATE COMPLEXITY   EXAMINATION:   Most Recent Physical Functioning:   Gross Assessment: left hip grossly 2/5 to 3-/5                       Balance:  Sitting: Impaired  Sitting - Static: Good (unsupported)  Sitting - Dynamic: Fair (occasional)  Standing: Impaired  Standing - Static: Good  Standing - Dynamic : Fair Bed Mobility:  Supine to Sit: Modified independent  Sit to Supine: Modified independent  Wheelchair Mobility: NT     Transfers:  Sit to Stand: Contact guard assistance;Minimum assistance  Stand to Sit: Contact guard assistance  Gait:     Base of Support: Center of gravity altered  Speed/Clotilde: Slow  Gait Abnormalities: Hemiplegic  Distance (ft): 450 Feet (ft)  Assistive Device: Walker luke  Ambulation - Level of Assistance: Contact guard assistance;Stand-by assistance  Interventions: Safety awareness training; Tactile cues; Verbal cues      Body Structures Involved:  1. Nerves  2. Voice/Speech  3. Bones  4. Joints  5. Muscles Body Functions Affected:  1. Mental  2. Sensory/Pain  3. Neuromusculoskeletal  4. Movement Related Activities and Participation Affected:  1. General Tasks and Demands  2. Mobility  3. Self Care  4. Interpersonal Interactions and Relationships   Number of elements that affect the Plan of Care: 4+: HIGH COMPLEXITY   CLINICAL PRESENTATION:   Presentation: Evolving clinical presentation with changing clinical characteristics: MODERATE COMPLEXITY   CLINICAL DECISION MAKIN [facility] Short Form  How much difficulty does the patient currently have. .. Unable A Lot A Little None   1. Turning over in bed (including adjusting bedclothes, sheets and blankets)? [] 1   [] 2   [] 3   [x] 4   2. Sitting down on and standing up from a chair with arms ( e.g., wheelchair, bedside commode, etc.)   [] 1   [] 2   [x] 3   [] 4   3. Moving from lying on back to sitting on the side of the bed? [] 1   [] 2   [] 3   [x] 4   How much help from another person does the patient currently need. .. Total A Lot A Little None   4. Moving to and from a bed to a chair (including a wheelchair)? [] 1   [] 2   [x] 3   [] 4   5. Need to walk in hospital room? [] 1   [] 2   [x] 3   [] 4   6. Climbing 3-5 steps with a railing? [] 1   [x] 2   [] 3   [] 4   © , Trustees of [address] [place] [address] [address] [id number], under license to [employer]. All rights reserved      Score:  Initial: 13 Most Recent: 19 (Date: 2020)    Interpretation of Tool:  Represents activities that are increasingly more difficult (i.e. Bed mobility, Transfers, Gait).     Medical Necessity:     · Patient is expected to demonstrate progress in   · strength, range of motion, balance, coordination, and functional technique  ·  to   · increase independence with all mobility. · .  Reason for Services/Other Comments:  · Patient continues to require skilled intervention due to   · medical complications and mobility deficits which impact his level of function, safety, and independence as indicated above. · .   Use of outcome tool(s) and clinical judgement create a POC that gives a: Questionable prediction of patient's progress: MODERATE COMPLEXITY        TREATMENT:      Pre-treatment Symptoms/Complaints: see above  Pain: Initial: 0/10 Post Session:  0/10       Therapeutic Activity: (    30 Minutes): Therapeutic activities including bed mobility training, transfer training from various surface heights, ambulation on level ground, static/dynamic standing balance, posture training, instruction in sequencing with luke walker and gait mechanics, and patient education to improve mobility, strength and balance. Required moderate Safety awareness training; Tactile cues; Verbal cues to promote static and dynamic balance in standing, promote coordination of bilateral, lower extremity(s) and promote motor control of bilateral, lower extremity(s). Braces/Orthotics/Lines/Etc:   · L LE AFO  Treatment/Session Assessment:    · Response to Treatment:  See above  · Interdisciplinary Collaboration:   o Physical Therapy Assistant  o Registered Nurse  o Rehabilitation Attendant  · After treatment position/precautions:   o Supine in bed  o Bed alarm/tab alert on  o Bed/Chair-wheels locked  o Bed in low position  o Call light within reach  o RN notified   · Compliance with Program/Exercises: Compliant all of the time  · Recommendations/Intent for next treatment session: \"Next visit will focus on advancements to more challenging activities and reduction in assistance provided\".     Total Treatment Duration:  PT Patient Time In/Time Out  Time In: 1430  Time Out: [address] [place], PTA

## 2020-05-28 NOTE — PROGRESS NOTES
OT Note: Patient defers OT today stating \"I'm tired. \"  Plan to check back another time as schedule permits.

## 2020-05-28 NOTE — PROGRESS NOTES
Hospitalist Progress Note     Admit Date:  2019  9:07 AM   Name:  Gifty Johnson   Age:  53 y.o.  :  1975   MRN:  [id number]   PCP:  William Mejias MD  Treatment Team: Attending Provider: Karma Chavez DO; Care Manager: Mamie Wiley [facility]; Utilization Review: Richi Srinivasan RN; Nurse Practitioner: Nato Newell NP; Hospitalist: Sri Kuar NP; Utilization Review: Deyanira Galicia RN; Charge Nurse: Cele Jarrell; Occupational Therapist: SOTERO Walters, OTR/L    Subjective:   HPI and or CC:  54 yo AA male with PMH of DM, HTN admitted  for CVA affecting numerous areas of the brain. He was placed on ASA and statin. He has remained alert and oriented x3. Some movement to right side but unable to grasp with right hand. He is currently waiting on placement and this has remained difficult due to waiting on Medicaid. :  Alert and oriented x3. Voices no complaints today. Continues to wait for placement. PT/OT following patient. Continues to wait on SSI and Medicaid approval.        Objective:     Patient Vitals for the past 24 hrs:   Temp Pulse Resp BP SpO2   20 1056 97.6 °F (36.4 °C) 69 17 127/76 96 %   20 0802 97.4 °F (36.3 °C) 69 17 131/90 96 %   20 0608 97.7 °F (36.5 °C) 74 17 129/81 98 %   20 0052 97.9 °F (36.6 °C) 66 17 132/87 97 %   20 98.1 °F (36.7 °C) 82 17 116/79 96 %   20 1501 97.9 °F (36.6 °C) 75 17 108/76 97 %   20 1118 98.2 °F (36.8 °C) 80 17 124/87 97 %     Oxygen Therapy  O2 Sat (%): 96 % (20 1056)  Pulse via Oximetry: 75 beats per minute (20 0400)  O2 Device: Room air (20 1648)    Intake/Output Summary (Last 24 hours) at 2020 1106  Last data filed at 2020 2152  Gross per 24 hour   Intake 222 ml   Output    Net 222 ml         REVIEW OF SYSTEMS: Comprehensive ROS performed and negative except as stated in HPI.     Physical Examination:  General:       No acute distress    Lungs:          CTA bilaterally. Resp even and nonlabored  Heart:            S1S2 present without murmurs rubs gallops. RRR. No LE edema  Abdomen:    Soft, non tender, non distended. BS present  Extremities: No cyanosis. Left sided hemiparesis  Neurologic:  moves right hand and raises arm at elbow moderately, unable to squeeze my fingers left hand, mildly slurred speech.  PERRLA, strength 4/5 RUE/RLE. Data Review:  I have reviewed all labs, meds, telemetry events, and studies from the last 24 hours. No results found for this or any previous visit (from the past 24 hour(s)).      All Micro Results     None          Current Meds:  Current Facility-Administered Medications   Medication Dose Route Frequency    magnesium oxide (MAG-OX) tablet 400 mg  400 mg Oral BID    metFORMIN (GLUCOPHAGE) tablet 500 mg  500 mg Oral BID WITH MEALS    acetaminophen (TYLENOL) tablet 650 mg  650 mg Oral Q4H PRN    diphenhydrAMINE (BENADRYL) capsule 25 mg  25 mg Oral Q6H PRN    acetaminophen (TYLENOL) tablet 650 mg  650 mg Oral Q8H    lip protectant (BLISTEX) ointment 1 Each  1 Each Topical PRN    metoprolol succinate (TOPROL-XL) XL tablet 25 mg  25 mg Oral DAILY    polyethylene glycol (MIRALAX) packet 17 g  17 g Oral DAILY PRN    guaiFENesin (ROBITUSSIN) 100 mg/5 mL oral liquid 100 mg  100 mg Oral Q4H PRN    hydrALAZINE (APRESOLINE) 20 mg/mL injection 20 mg  20 mg IntraVENous Q4H PRN    atorvastatin (LIPITOR) tablet 80 mg  80 mg Oral QHS    lisinopril (PRINIVIL, ZESTRIL) tablet 40 mg  40 mg Oral DAILY    sodium chloride (NS) flush 5-40 mL  5-40 mL IntraVENous PRN    ondansetron (ZOFRAN) injection 4 mg  4 mg IntraVENous Q4H PRN    aspirin chewable tablet 81 mg  81 mg Oral DAILY    enoxaparin (LOVENOX) injection 40 mg  40 mg SubCUTAneous Q24H    dextrose 40% (GLUTOSE) oral gel 1 Tube  15 g Oral PRN    glucagon (GLUCAGEN) injection 1 mg  1 mg IntraMUSCular PRN    dextrose (D50W) injection syrg 12.5-25 g 25-50 mL IntraVENous PRN       Diet:  DIET NUTRITIONAL SUPPLEMENTS  DIET DIABETIC CONSISTENT CARB    Other Studies (last 24 hours):  No results found. Assessment and Plan:     Hospital Problems as of 5/28/2020 Date Reviewed: 3/3/2017          Codes Class Noted - Resolved POA    Hypomagnesemia ICD-10-CM: E83.42  ICD-9-CM: 275.2  3/7/2020 - Present Unknown        PFO (patent foramen ovale) ICD-10-CM: Q21.1  ICD-9-CM: 745.5  12/17/2019 - Present Yes        Arrhythmia ICD-10-CM: I49.9  ICD-9-CM: 427.9  12/15/2019 - Present Yes        Hyperlipemia ICD-10-CM: E78.5  ICD-9-CM: 272.4  12/15/2019 - Present Yes        * (Principal) Acute embolic stroke ([facility] [address]: I63.9  ICD-9-CM: 434.11  12/12/2019 - Present Yes        Hypertension (Chronic) ICD-10-CM: I10  ICD-9-CM: 401.9  Unknown - Present Yes        Type 2 diabetes mellitus (HCC) (Chronic) ICD-10-CM: E11.9  ICD-9-CM: 250.00  Unknown - Present Yes              A/P:    Acute embolic stroke  MRI brain showed acute to early subacute infarcts in the left cerebellar hemisphere, in the periventricular deep left frontoparietal white matter and likely additional areas of restricted diffusion in the right paramedian yariel. +PFO on echo  On ASA, statin  Per PT recommendations, orthotist consult for AFO for L LE. Will need shunt closure once discharged-  4/30-I spoke with cardiology, they state PFO closure can wait until patient discharged to a facility.     Hypomagnesemia  Resolved       Hypertension  Continue metoprolol and ACE inhibitor  Goal BP <130/80     Type 2 diabetes mellitus:    Monitor, BGL controlled        Signed:  Frances Muir NP

## 2020-05-28 NOTE — PROGRESS NOTES
Problem: Self Care Deficits Care Plan (Adult)  Goal: *Acute Goals and Plan of Care (Insert Text)  Description  GOALS REVIEWED and UPDATED ON 5/12/2020 (BOLD INDICATES MOST RECENTLY UPDATED GOAL):  1. Patient will demonstrate appropriate safety awareness and protection of L UE during bed mobility and functional transfers with no verbal cues. CONTINUE   2. Patient will complete lower body bathing and dressing with Min A and adaptive equipment as needed. CONTINUE  3. Patient will complete upper body bathing and dressing with Supervision and adaptive equipment as needed. 4. Patient will complete weightbearing into the L UE with ADL tasks with minimal assistance to improve ability to use as a functional assist during ADL tasks. CONTINUE  5. Patient will demonstrate L UE SROM HEP within 7 days. CONTINUE  6. Pt will complete bed mobility with Mod I and no verbal cueing. CONTINUE  7. Pt will complete functional transfers with Supervision with equipment as needed. CONTINUE  8. Pt will don/doff UE sling with Mod I and no verbal cueing. CONTINUE  9. Pt will complete toileting with Supervision for toilet transfer and gown management. 10. Patient will participate in using L UE as a functional assist for ADL for 15 minutes with minimal facilitation. CONTINUE  11. Patient will complete sit to stand at midline with Supervision and cueing. 12. Patient will complete therapeutic exercises with L UE with minimal tactile cues for facilitation of the LUE. CONTINUE  13. Patient will don L LE AFO with SBA and minimal verbal cueing.     Outcome: Progressing Towards Goal     OCCUPATIONAL THERAPY: Daily Note and PM    5/28/2020  INPATIENT: OT Visit Days: 5  Payor: [id number] / Plan: [place] / Product Type: Medicaid /      NAME/AGE/GENDER: Alanna Boss is a 53 y.o. male   PRIMARY DIAGNOSIS:  Acute ischemic stroke ([facility] [I63.9]  Cerebrovascular accident (CVA) due to embolism ([address] [I63.9] Acute embolic stroke ([facility] Acute embolic stroke ([facility]    YNF-25: Treatment Diagnosis:    · Generalized Muscle Weakness (M62.81)  · Other lack of cordination (R27.8)  · Hemiplegia and hemiparesis following cerebral infarction affecting   · left non-dominant side (I69.354)  · Abnormal posture (R29.3)   Precautions/Allergies:    NO PULLING ON LUE   LUE in sling with shoulder joint approximated and supported, needs taping   Patient has no known allergies. ASSESSMENT:     Mr. Rui Courtney presents to the hospital with L sided weakness and acute ischemic CVA. MRI revealed acute to subacute L cerebellar and R paramedian yariel infarcts. 5/28/2020: Pt agreeable to OT - supine in bed. He was agreeable to L UE neuro reeducation in bed. His joints are stiffening in his hand/wrist/long flexors and shoulder so he would benefit from a low heat modality (I.e. warm towel) prior to attempting to stretch thinking more long term - hopefully will regain more use of his hand/arm. Will continue POC. This section established at most recent assessment   PROBLEM LIST (Impairments causing functional limitations):  1. Decreased Strength  2. Decreased ADL/Functional Activities  3. Decreased Transfer Abilities  4. Decreased Ambulation Ability/Technique  5. Decreased Balance  6. Decreased Activity Tolerance  7. Increased Fatigue  8. Decreased Flexibility/Joint Mobility  9. Decreased Knowledge of Precautions  10. Decreased [place] with Home Exercise Program   INTERVENTIONS PLANNED: (Benefits and precautions of occupational therapy have been discussed with the patient.)  1. Activities of daily living training  2. Adaptive equipment training  3. Balance training  4. Clothing management  5. Cognitive training  6. Donning&doffing training  7. Keanu tech training  8. Neuromuscular re-eduation  9. Therapeutic activity  10.  Therapeutic exercise     TREATMENT PLAN: Frequency/Duration: Follow patient 3 times per week to address above goals.  Rehabilitation Potential For Stated Goals: Excellent     REHAB RECOMMENDATIONS (at time of discharge pending progress):    Placement: It is my opinion, based on this patient's performance to date, that Mr. Joanne Beach may benefit from intensive therapy at an [address] [place] [address] after discharge due to potential to make ongoing and sustainable functional improvement that is of practical value. Jose Camacho Pt functioning far below independent baseline, demonstrating good improvement and participation. Pt would likely benefit greatly and increase independence from inpatient rehab stay. Equipment:    TBD               OCCUPATIONAL PROFILE AND HISTORY:   History of Present Injury/Illness (Reason for Referral):  See H&P  Past Medical History/Comorbidities:   Mr. Joanne Beach  has a past medical history of Acute ischemic stroke ([address] (12/12/2019), Acute pancreatitis (11/19/2014), Cerebrovascular accident (CVA) due to embolism ([address] (12/12/2019), Diabetes ([address] (2002), Diabetes ([address] Diabetes mellitus, and Hypertension. He also has no past medical history of Arthritis, Asthma, Autoimmune disease ([address] CAD (coronary artery disease), Cancer ([address] Chronic kidney disease, COPD, Dementia, Dementia ([address] Heart failure ([address] Ill-defined condition, Infectious disease, Liver disease, Other ill-defined conditions(799.89), Psychiatric disorder, PUD (peptic ulcer disease), Seizures ([address] or Sleep disorder. Mr. Joanne Beach  has a past surgical history that includes hx hernia repair and hx orthopaedic.   Social History/Living Environment:   Home Environment: Apartment  # Steps to Enter: 12  Rails to Enter: Yes  [employer] : Bilateral  One/Two Story Residence: One story  Living Alone: No  Support Systems: Spouse/Significant Other/Partner  Patient Expects to be Discharged to[de-identified] Unknown  Current DME Used/Available at Home: None  Tub or Shower Type: Tub/Shower combination  Prior Level of Function/Work/Activity:  Pt lives at home with his wife. Pt is typically independent with ADL/functional mobility. Pt does not drive. Pt was working part-time at [employer]. Personal Factors:          Age:  53 y.o. Past/Current Experience:  CVA with flaccid L side        Other factors that influence how disability is experienced by the patient:  multiple co-morbidities    Number of Personal Factors/Comorbidities that affect the Plan of Care: Extensive review of physical, cognitive, and psychosocial performance (3+):  HIGH COMPLEXITY   ASSESSMENT OF OCCUPATIONAL PERFORMANCE[de-identified]   Activities of Daily Living:   Basic ADLs (From Assessment) Complex ADLs (From Assessment)   Feeding: Setup  Oral Facial Hygiene/Grooming: Minimum assistance  Bathing: Minimum assistance, Moderate assistance  Upper Body Dressing: Minimum assistance  Lower Body Dressing: Moderate assistance  Toileting: Minimum assistance Instrumental ADL  Meal Preparation: Total assistance  Homemaking: Total assistance  Medication Management: Total assistance  Financial Management: Total assistance   Grooming/Bathing/Dressing Activities of Daily Living         Upper Body Bathing  Bathing Assistance: Min A   Position Performed: Seated in chair                  Lower Body Dressing Assistance  Socks:  Total assistance (dependent) Bed/Mat Mobility  Supine to Sit: Modified independent  Sit to Supine: Modified independent  Sit to Stand: Contact guard assistance;Minimum assistance  Stand to Sit: Contact guard assistance     Most Recent Physical Functioning:   Gross Assessment:                  Posture:     Balance:  Sitting: Impaired  Sitting - Static: Good (unsupported)  Sitting - Dynamic: Fair (occasional)  Standing: Impaired  Standing - Static: Good  Standing - Dynamic : Fair Bed Mobility:  Supine to Sit: Modified independent  Sit to Supine: Modified independent  Wheelchair Mobility:     Transfers:  Sit to Stand: Contact guard assistance;Minimum assistance  Stand to Sit: Contact guard assistance Patient Vitals for the past 6 hrs:   BP BP Patient Position SpO2 Pulse   05/28/20 1056 127/76 At rest 96 % 69       Mental Status  Neurologic State: Alert  Orientation Level: Oriented X4  Cognition: Follows commands  Perception: Verbal, Tactile  Perseveration: Verbal cues provided, Tactile cues provided  Safety/Judgement: Fall prevention                          Physical Skills Involved:  1. Range of Motion  2. Balance  3. Strength  4. Activity Tolerance  5. Fine Motor Control  6. Gross Motor Control Cognitive Skills Affected (resulting in the inability to perform in a timely and safe manner):  1. Perception  2. Expression Psychosocial Skills Affected:  1. Habits/Routines  2. Environmental Adaptation  3. Social Interaction  4. Emotional Regulation  5. Self-Awareness  6. Awareness of Others  7. Social Roles   Number of elements that affect the Plan of Care: 5+:  HIGH COMPLEXITY   CLINICAL DECISION MAKING:   [facility] AM-PAC 6 Clicks   Daily Activity Inpatient Short Form  How much help from another person does the patient currently need. .. Total A Lot A Little None   1. Putting on and taking off regular lower body clothing? [] 1   [] 2   [x] 3   [] 4   2. Bathing (including washing, rinsing, drying)? [] 1   [] 2   [x] 3   [] 4   3. Toileting, which includes using toilet, bedpan or urinal?   [] 1   [] 2   [x] 3   [] 4   4. Putting on and taking off regular upper body clothing? [] 1   [] 2   [x] 3   [] 4   5. Taking care of personal grooming such as brushing teeth? [] 1   [] 2   [x] 3   [] 4   6. Eating meals? [] 1   [] 2   [x] 3   [] 4   © 2007, Trustees of [facility], under license to [employer]. All rights reserved      Score:  Initial: 11 Most Recent: 18 (5/12/20)    Interpretation of Tool:  Represents activities that are increasingly more difficult (i.e. Bed mobility, Transfers, Gait).     Medical Necessity:     · Patient demonstrates   · good and excellent  ·  rehab potential due to higher previous functional level. Reason for Services/Other Comments:  · Patient continues to require skilled intervention due to   · Decreased independence with ADL/functional transfers that impacts overall quality of life. · .   Use of outcome tool(s) and clinical judgement create a POC that gives a: MODERATE COMPLEXITY         TREATMENT:   (In addition to Assessment/Re-Assessment sessions the following treatments were rendered)     Pre-treatment Symptoms/Complaints: Pt reports no pain in L hand which he has been experiencing in prior sessions. Pain: Initial:   Pain Intensity 1: 0 Post Session: Unchanged     Self Care: (0 minutes): Procedure(s) utilized to improve and/or restore self-care/home management as related to dressing and toileting. Required minimal visual, verbal and manual cueing to facilitate activities of daily living skills. Neuromuscular Re-education: (15 minutes):  Exercise/activities per grid below to improve balance, coordination, kinesthetic sense, posture and proprioception. Required minimal visual, verbal, manual and tactile cues to promote static and dynamic balance in sitting, promote coordination of left, upper extremity(s) and promote motor control of left, upper extremity(s). LUE Re-Education  Muscle Facilitation: 1.  individual finger extension 5-10 reps. 2. Individual finger/thumb abduction 3. shoulder flexion 5-10 reps short arc.  5. Supination/pronation 5-10 reps. 6. Triceps facilitation in supine 5-10 reps. 7. Shoulder ER in supine shoulder adducted 1-2 sets 10-15 reps to neutral. 8. Wrist extension 4-5 reps. 9. Gross finger extension 4-5 reps. Muscle Inhibition: 1. Metarcarpal stretches. 2. Thumb webspace stretch. 3. long finger flexor stretch. 4. Long finger extensor stretch. 5. Carpals on carpals, scaphoid on radius, ulnar deviation stretch in prep for hand/wrist motion.         Date:   4/22/2020 Date:  4/24/2020 Date:  4/27/2020 Date:   5/7/20 Activity/Exercise Parameters Parameters Parameters    Shoulder flexion SROM/AAROM       Elbow flexion/extension SROM/AAROM       Forearm supination/pronation        Wrist extension     20 reps AROM (partial ROM);  Attempted isometric extension but patient not able to hold position in extension    Finger flexion/extension     AAROM for tendon gliding (reports pain with flexion); 15-20 reps    Forward Reaching with soccer ball 20 reps (using two handed technique with R hand over left; therapist supporting L elbow) 20 reps (using two handed technique with R hand over left; therapist supporting L elbow) 25 reps (using two handed technique with R hand over left; therapist supporting L elbow) 20 reps using two handed technique and pushing forward for protraction/forward reaching (using washcloth)   Crossing Midline with soccer ball    20 reps each way (using two handed technique with R hand over left; therapist supporting L elbow) Pt completed with for 20 reps with support at the elbow and maximal facilitation from therapist to moving UE (using washcloth)   Shoulder Horizontal Abduction & Adduction with soccer ball 20 reps each way (using two handed technique with R hand over left; therapist supporting L elbow) 20 reps each way (using two handed technique with R hand over left; therapist supporting L elbow)     Shoulder Flexion & Crossing Midline with cones 14 reps (pt stabilized L wrist; therapist supporting L elbow) 14 reps (pt stabilized L wrist; therapist supporting L elbow) 14 reps (pt stabilized L wrist; therapist supporting L elbow) 5 reps with maximal assistance to support at the elbow and wrist   Digit Flexion & Extension with pegs 24 reps (using two handed technique with assist needed for object release) 24 reps (using two handed technique with decreased assist needed for object release) 24 reps (using two handed technique with decreased assist needed for object release)    1st CMC Flexion/Extension  10 reps (using yellow putty with Total A from therapist to complete) 15 reps (using yellow putty with Total A from therapist to complete)                  LUE Re-Education  Muscle Facilitation: 1.  individual finger extension 5-10 reps. 2. Individual finger/thumb abduction 3. shoulder flexion 5-10 reps short arc.  5. Supination/pronation 5-10 reps. 6. Triceps facilitation in supine 5-10 reps. 7. Shoulder ER in supine shoulder adducted 1-2 sets 10-15 reps to neutral. 8. Wrist extension 4-5 reps. 9. Gross finger extension 4-5 reps. Muscle Inhibition: 1. Metarcarpal stretches. 2. Thumb webspace stretch. 3. long finger flexor stretch. 4. Long finger extensor stretch. 5. Carpals on carpals, scaphoid on radius, ulnar deviation stretch in prep for hand/wrist motion.       Date:  4/14/20  Date:  4/15/20 Date:   4/16/20 Date:  5/7/20   Activity/Exercise Parameters Parameters     Midline orientation sit to stand  Moderate facilitation at the L LE and for decreased rotation at the trunk      Midline sit to squat  Min to mod A w/ hands in his lap       Weightbearing into L side standing at sink        Forward reach with cane 10 reps with moderate facilitation at the elbow/scapula 15 reps with moderate facilitation at the elbow/scapula into protraction  15 reps with moderate facilitation at the elbow/scapula into protraction 25 reps with moderate facilitation at the elbow/scapula  (cane on the floor and pt pushing forward)   External rotation with cane 10 reps with minimal facilitation  15 reps with SBA/CGA  15 reps with SBA/CGA 25 reps with minimal facilitation (cane on the floor and elbow at his side)   Posture  Upright sitting/standing with verbal/visual cueing  Pt encouraged for upright posture with moderate cues throughout session  Pt encouraged for upright posture with moderate cues throughout session     Weightbearing into elbow  10 reps with moderate assistance pushing up into midline  2 sets with prolonged weight bearing and reaching to the L of midline for 2 sets x 15 reps  Sitting at the table with pt encouraged for propped elbows and weightbearing through the L with moderate cues     Weightbearing into hand Mod to maximal assistance with facilitation at the elbow; 2 sets x 10 reps       Elbow flexion 10 reps AAROM; 10 reps holding ball in B hands       Grasp release of washcloth  10 reps with moderate to maximal facilitation for reaching   4-5 reps with additional time     Trunk Rotation   15 reps with minima facilitation  15 reps with minimal facilitation and additional time            Side Scooting   Minimal facilitation and assistance for positioning and weightbearing of L Hand through his L knee and forward forward flexion at the trunk        Braces/Orthotics/Lines/Etc:   · O2 device: Room air  Treatment/Session Assessment:    Response to Treatment:  Pt is making slight improvement in acute care goals. · Interdisciplinary Collaboration:   o Occupational Therapist  o Registered Nurse  · After treatment position/precautions:   o Supine in bed  o Bed alarm/tab alert on  o Bed/Chair-wheels locked  o Bed in low position  o Call light within reach  o RN notified   · Compliance with Program/Exercises: Compliant all of the time, Will assess as treatment progresses. · Recommendations/Intent for next treatment session: \"Next visit will focus on advancements to more challenging activities and reduction in assistance provided\".   Total Treatment Duration:   OT Patient Time In/Time Out  Time In: 1521  Time Out: 1603     SOTERO Pavon, OTR/L

## 2020-05-28 NOTE — PROGRESS NOTES
Conferred with staff regarding patient's days of covering his head completely under linen. According to staff this has been an on-going response to patient. I am uncertain of patient's mental health status regarding depression. During my visits it appears that Mr. Joanne Beach is grieving.  follow-up is planned.         Dontrell Anne 66  Board Certified

## 2020-05-29 PROCEDURE — 74011250637 HC RX REV CODE- 250/637: Performed by: HOSPITALIST

## 2020-05-29 PROCEDURE — 74011250637 HC RX REV CODE- 250/637: Performed by: INTERNAL MEDICINE

## 2020-05-29 PROCEDURE — 74011250637 HC RX REV CODE- 250/637: Performed by: NURSE PRACTITIONER

## 2020-05-29 PROCEDURE — 74011250636 HC RX REV CODE- 250/636: Performed by: INTERNAL MEDICINE

## 2020-05-29 PROCEDURE — 65270000029 HC RM PRIVATE

## 2020-05-29 RX ADMIN — ATORVASTATIN CALCIUM 80 MG: 80 TABLET, FILM COATED ORAL at 20:43

## 2020-05-29 RX ADMIN — ASPIRIN 81 MG 81 MG: 81 TABLET ORAL at 09:58

## 2020-05-29 RX ADMIN — Medication 400 MG: at 17:59

## 2020-05-29 RX ADMIN — METOPROLOL SUCCINATE 25 MG: 25 TABLET, FILM COATED, EXTENDED RELEASE ORAL at 09:58

## 2020-05-29 RX ADMIN — GUAIFENESIN 100 MG: 100 SOLUTION ORAL at 17:59

## 2020-05-29 RX ADMIN — METFORMIN HYDROCHLORIDE 500 MG: 500 TABLET ORAL at 09:58

## 2020-05-29 RX ADMIN — ACETAMINOPHEN 650 MG: 325 TABLET, FILM COATED ORAL at 20:44

## 2020-05-29 RX ADMIN — ENOXAPARIN SODIUM 40 MG: 40 INJECTION SUBCUTANEOUS at 14:25

## 2020-05-29 RX ADMIN — Medication 400 MG: at 09:58

## 2020-05-29 RX ADMIN — ACETAMINOPHEN 650 MG: 325 TABLET, FILM COATED ORAL at 05:34

## 2020-05-29 RX ADMIN — ACETAMINOPHEN 650 MG: 325 TABLET, FILM COATED ORAL at 14:25

## 2020-05-29 RX ADMIN — DIPHENHYDRAMINE HYDROCHLORIDE 25 MG: 25 CAPSULE ORAL at 17:59

## 2020-05-29 RX ADMIN — LISINOPRIL 40 MG: 20 TABLET ORAL at 09:58

## 2020-05-29 RX ADMIN — METFORMIN HYDROCHLORIDE 500 MG: 500 TABLET ORAL at 17:59

## 2020-05-29 NOTE — PROGRESS NOTES
Problem: Patient Education: Go to Patient Education Activity  Goal: Patient/Family Education  Outcome: Progressing Towards Goal     Problem: Pressure Injury - Risk of  Goal: *Prevention of pressure injury  Description: Document Dewey Scale and appropriate interventions in the flowsheet. Outcome: Progressing Towards Goal  Note: Pressure Injury Interventions:  Sensory Interventions: Assess changes in LOC, Assess need for specialty bed    Moisture Interventions: Apply protective barrier, creams and emollients, Absorbent underpads, Offer toileting Q_hr, Moisture barrier    Activity Interventions: PT/OT evaluation, Pressure redistribution bed/mattress(bed type), Increase time out of bed    Mobility Interventions: PT/OT evaluation, Pressure redistribution bed/mattress (bed type), HOB 30 degrees or less    Nutrition Interventions: Offer support with meals,snacks and hydration    Friction and Shear Interventions: HOB 30 degrees or less                Problem: Diabetes Self-Management  Goal: *Disease process and treatment process  Description: Define diabetes and identify own type of diabetes; list 3 options for treating diabetes. Outcome: Progressing Towards Goal     Problem: Pressure Injury - Risk of  Goal: *Prevention of pressure injury  Description: Document Dewey Scale and appropriate interventions in the flowsheet.   Outcome: Progressing Towards Goal  Note: Pressure Injury Interventions:  Sensory Interventions: Assess changes in LOC, Assess need for specialty bed    Moisture Interventions: Apply protective barrier, creams and emollients, Absorbent underpads, Offer toileting Q_hr, Moisture barrier    Activity Interventions: PT/OT evaluation, Pressure redistribution bed/mattress(bed type), Increase time out of bed    Mobility Interventions: PT/OT evaluation, Pressure redistribution bed/mattress (bed type), HOB 30 degrees or less    Nutrition Interventions: Offer support with meals,snacks and hydration    Friction and Shear Interventions: HOB 30 degrees or less

## 2020-05-29 NOTE — PROGRESS NOTES
Hospitalist Progress Note     Admit Date:  2019  9:07 AM   Name:  Avelino Bacon   Age:  53 y.o.  :  1975   MRN:  [id number]   PCP:  Yonatan Lopez MD  Treatment Team: Attending Provider: Eric Looney DO; Care Manager: Dejon Wu LMSW; Utilization Review: Hao Grossman RN; Nurse Practitioner: Donnie Taylor NP; Hospitalist: Gregorio Henderson NP; Utilization Review: Lee Calderon RN; Charge Nurse: Donita Estrada Nurse: Blane Kaiser; Physical Therapy Assistant: Zabrina Paredes PTA    Subjective:   HPI and or CC:  54 yo AA male with PMH of DM, HTN admitted  for CVA affecting numerous areas of the brain. He was placed on ASA and statin. He has remained alert and oriented x3. Some movement to right side but unable to grasp with right hand. He is currently waiting on placement and this has remained difficult due to waiting on Medicaid. :  Patient resting in bed this am. Easily awakens. Alert and oriented x3. Voices no complaints today. PT/OT following patient. Objective:     Patient Vitals for the past 24 hrs:   Temp Pulse Resp BP SpO2   20 0800 97.7 °F (36.5 °C) 72 18 124/80 95 %   20 0357 97.9 °F (36.6 °C) 74 18 138/78 100 %   20 2338 98.5 °F (36.9 °C) 69 18 121/78 98 %   20 1954 98.1 °F (36.7 °C) 86 18 112/72 97 %   20 1618 98.4 °F (36.9 °C) 79 17 124/83 99 %   20 1056 97.6 °F (36.4 °C) 69 17 127/76 96 %     Oxygen Therapy  O2 Sat (%): 95 % (20 0800)  Pulse via Oximetry: 75 beats per minute (20 0400)  O2 Device: Room air (20 1648)    Intake/Output Summary (Last 24 hours) at 2020 1042  Last data filed at 2020 2230  Gross per 24 hour   Intake 222 ml   Output    Net 222 ml         REVIEW OF SYSTEMS: Comprehensive ROS performed and negative except as stated in HPI. Physical Examination:  General:       No acute distress    Lungs:          CTA bilaterally. Resp even and nonlabored  Heart:            S1S2 present without murmurs rubs gallops. RRR. No LE edema  Abdomen:    Soft, non tender, non distended. BS present  Extremities: No cyanosis. Left sided hemiparesis  Neurologic:  moves right hand and raises arm at elbow moderately, unable to squeeze my fingers left hand, mildly slurred speech.  PERRLA, strength 4/5 RUE/RLE. Data Review:  I have reviewed all labs, meds, telemetry events, and studies from the last 24 hours. No results found for this or any previous visit (from the past 24 hour(s)).      All Micro Results     None          Current Meds:  Current Facility-Administered Medications   Medication Dose Route Frequency    magnesium oxide (MAG-OX) tablet 400 mg  400 mg Oral BID    metFORMIN (GLUCOPHAGE) tablet 500 mg  500 mg Oral BID WITH MEALS    acetaminophen (TYLENOL) tablet 650 mg  650 mg Oral Q4H PRN    diphenhydrAMINE (BENADRYL) capsule 25 mg  25 mg Oral Q6H PRN    acetaminophen (TYLENOL) tablet 650 mg  650 mg Oral Q8H    lip protectant (BLISTEX) ointment 1 Each  1 Each Topical PRN    metoprolol succinate (TOPROL-XL) XL tablet 25 mg  25 mg Oral DAILY    polyethylene glycol (MIRALAX) packet 17 g  17 g Oral DAILY PRN    guaiFENesin (ROBITUSSIN) 100 mg/5 mL oral liquid 100 mg  100 mg Oral Q4H PRN    hydrALAZINE (APRESOLINE) 20 mg/mL injection 20 mg  20 mg IntraVENous Q4H PRN    atorvastatin (LIPITOR) tablet 80 mg  80 mg Oral QHS    lisinopril (PRINIVIL, ZESTRIL) tablet 40 mg  40 mg Oral DAILY    sodium chloride (NS) flush 5-40 mL  5-40 mL IntraVENous PRN    ondansetron (ZOFRAN) injection 4 mg  4 mg IntraVENous Q4H PRN    aspirin chewable tablet 81 mg  81 mg Oral DAILY    enoxaparin (LOVENOX) injection 40 mg  40 mg SubCUTAneous Q24H    dextrose 40% (GLUTOSE) oral gel 1 Tube  15 g Oral PRN    glucagon (GLUCAGEN) injection 1 mg  1 mg IntraMUSCular PRN    dextrose (D50W) injection syrg 12.5-25 g  25-50 mL IntraVENous PRN       Diet:  DIET NUTRITIONAL SUPPLEMENTS  DIET DIABETIC CONSISTENT CARB    Other Studies (last 24 hours):  No results found. Assessment and Plan:     Hospital Problems as of 5/29/2020 Date Reviewed: 3/3/2017          Codes Class Noted - Resolved POA    Hypomagnesemia ICD-10-CM: E83.42  ICD-9-CM: 275.2  3/7/2020 - Present Unknown        PFO (patent foramen ovale) ICD-10-CM: Q21.1  ICD-9-CM: 745.5  12/17/2019 - Present Yes        Arrhythmia ICD-10-CM: I49.9  ICD-9-CM: 427.9  12/15/2019 - Present Yes        Hyperlipemia ICD-10-CM: E78.5  ICD-9-CM: 272.4  12/15/2019 - Present Yes        * (Principal) Acute embolic stroke ([facility], [place] [address] HOQ-57-MA: I63.9  ICD-9-CM: 434.11  12/12/2019 - Present Yes        Hypertension (Chronic) ICD-10-CM: I10  ICD-9-CM: 401.9  Unknown - Present Yes        Type 2 diabetes mellitus (HCC) (Chronic) ICD-10-CM: E11.9  ICD-9-CM: 250.00  Unknown - Present Yes              A/P:    Acute embolic stroke  MRI brain showed acute to early subacute infarcts in the left cerebellar hemisphere, in the periventricular deep left frontoparietal white matter and likely additional areas of restricted diffusion in the right paramedian yariel. +PFO on echo  On ASA, statin  Per PT recommendations, orthotist consult for AFO for L LE. Will need shunt closure once discharged-  4/30-I spoke with cardiology, they state PFO closure can wait until patient discharged to a facility.     Hypomagnesemia  Resolved       Hypertension  Continue metoprolol and ACE inhibitor  Goal BP <130/80     Type 2 diabetes mellitus:    Monitor, BGL controlled        Signed:  Tre Bennett NP

## 2020-05-30 LAB
ANION GAP SERPL CALC-SCNC: 5 MMOL/L (ref 7–16)
BASOPHILS # BLD: 0 K/UL (ref 0–0.2)
BASOPHILS NFR BLD: 1 % (ref 0–2)
BUN SERPL-MCNC: 24 MG/DL (ref 6–23)
CALCIUM SERPL-MCNC: 9.1 MG/DL (ref 8.3–10.4)
CHLORIDE SERPL-SCNC: 111 MMOL/L (ref 98–107)
CO2 SERPL-SCNC: 26 MMOL/L (ref 21–32)
CREAT SERPL-MCNC: 1.17 MG/DL (ref 0.8–1.5)
DIFFERENTIAL METHOD BLD: ABNORMAL
EOSINOPHIL # BLD: 0.1 K/UL (ref 0–0.8)
EOSINOPHIL NFR BLD: 2 % (ref 0.5–7.8)
ERYTHROCYTE [DISTWIDTH] IN BLOOD BY AUTOMATED COUNT: 15.9 % (ref 11.9–14.6)
GLUCOSE SERPL-MCNC: 102 MG/DL (ref 65–100)
HCT VFR BLD AUTO: 33.3 % (ref 41.1–50.3)
HGB BLD-MCNC: 10.8 G/DL (ref 13.6–17.2)
IMM GRANULOCYTES # BLD AUTO: 0 K/UL (ref 0–0.5)
IMM GRANULOCYTES NFR BLD AUTO: 0 % (ref 0–5)
LYMPHOCYTES # BLD: 2.5 K/UL (ref 0.5–4.6)
LYMPHOCYTES NFR BLD: 47 % (ref 13–44)
MCH RBC QN AUTO: 27.4 PG (ref 26.1–32.9)
MCHC RBC AUTO-ENTMCNC: 32.4 G/DL (ref 31.4–35)
MCV RBC AUTO: 84.5 FL (ref 79.6–97.8)
MONOCYTES # BLD: 0.3 K/UL (ref 0.1–1.3)
MONOCYTES NFR BLD: 6 % (ref 4–12)
NEUTS SEG # BLD: 2.3 K/UL (ref 1.7–8.2)
NEUTS SEG NFR BLD: 44 % (ref 43–78)
NRBC # BLD: 0 K/UL (ref 0–0.2)
PLATELET # BLD AUTO: 175 K/UL (ref 150–450)
PMV BLD AUTO: 11.4 FL (ref 9.4–12.3)
POTASSIUM SERPL-SCNC: 4.5 MMOL/L (ref 3.5–5.1)
RBC # BLD AUTO: 3.94 M/UL (ref 4.23–5.6)
SODIUM SERPL-SCNC: 142 MMOL/L (ref 136–145)
WBC # BLD AUTO: 5.2 K/UL (ref 4.3–11.1)

## 2020-05-30 PROCEDURE — 74011250637 HC RX REV CODE- 250/637: Performed by: HOSPITALIST

## 2020-05-30 PROCEDURE — 74011250637 HC RX REV CODE- 250/637: Performed by: NURSE PRACTITIONER

## 2020-05-30 PROCEDURE — 80048 BASIC METABOLIC PNL TOTAL CA: CPT

## 2020-05-30 PROCEDURE — 36415 COLL VENOUS BLD VENIPUNCTURE: CPT

## 2020-05-30 PROCEDURE — 85025 COMPLETE CBC W/AUTO DIFF WBC: CPT

## 2020-05-30 PROCEDURE — 74011250637 HC RX REV CODE- 250/637: Performed by: INTERNAL MEDICINE

## 2020-05-30 PROCEDURE — 65270000029 HC RM PRIVATE

## 2020-05-30 PROCEDURE — 74011250636 HC RX REV CODE- 250/636: Performed by: INTERNAL MEDICINE

## 2020-05-30 PROCEDURE — 83735 ASSAY OF MAGNESIUM: CPT

## 2020-05-30 RX ADMIN — ENOXAPARIN SODIUM 40 MG: 40 INJECTION SUBCUTANEOUS at 15:00

## 2020-05-30 RX ADMIN — Medication 400 MG: at 08:58

## 2020-05-30 RX ADMIN — METFORMIN HYDROCHLORIDE 500 MG: 500 TABLET ORAL at 08:58

## 2020-05-30 RX ADMIN — LISINOPRIL 40 MG: 20 TABLET ORAL at 09:21

## 2020-05-30 RX ADMIN — ACETAMINOPHEN 650 MG: 325 TABLET, FILM COATED ORAL at 15:00

## 2020-05-30 RX ADMIN — ASPIRIN 81 MG 81 MG: 81 TABLET ORAL at 08:58

## 2020-05-30 RX ADMIN — DIPHENHYDRAMINE HYDROCHLORIDE 25 MG: 25 CAPSULE ORAL at 17:10

## 2020-05-30 RX ADMIN — ATORVASTATIN CALCIUM 80 MG: 80 TABLET, FILM COATED ORAL at 21:19

## 2020-05-30 RX ADMIN — METOPROLOL SUCCINATE 25 MG: 25 TABLET, FILM COATED, EXTENDED RELEASE ORAL at 08:58

## 2020-05-30 RX ADMIN — ACETAMINOPHEN 650 MG: 325 TABLET, FILM COATED ORAL at 05:29

## 2020-05-30 RX ADMIN — Medication 400 MG: at 17:10

## 2020-05-30 RX ADMIN — ACETAMINOPHEN 650 MG: 325 TABLET, FILM COATED ORAL at 21:19

## 2020-05-30 RX ADMIN — METFORMIN HYDROCHLORIDE 500 MG: 500 TABLET ORAL at 17:10

## 2020-05-30 RX ADMIN — GUAIFENESIN 100 MG: 100 SOLUTION ORAL at 15:00

## 2020-05-30 NOTE — PROGRESS NOTES
Patient was  calm  Alert  He wanted to talk about his marriage   His wife of 10 years has decided to leave him. Per patient there was a lot of recent disagreements about how to raise their 13year old son. Patient is very reflective and admits his mistakes. He wants to get back together as a couple. He is also concerned what this means for him in placement when he leaves. Prayer    Will follow up more.     George Yung, staff

## 2020-05-30 NOTE — PROGRESS NOTES
Problem: Patient Education: Go to Patient Education Activity  Goal: Patient/Family Education  Outcome: Progressing Towards Goal     Problem: Pressure Injury - Risk of  Goal: *Prevention of pressure injury  Description: Document Dewey Scale and appropriate interventions in the flowsheet. Outcome: Progressing Towards Goal  Note: Pressure Injury Interventions:  Sensory Interventions: Assess changes in LOC, Assess need for specialty bed    Moisture Interventions: Apply protective barrier, creams and emollients, Absorbent underpads, Offer toileting Q_hr, Moisture barrier, Minimize layers, Maintain skin hydration (lotion/cream)    Activity Interventions: PT/OT evaluation, Pressure redistribution bed/mattress(bed type), Increase time out of bed    Mobility Interventions: HOB 30 degrees or less, Pressure redistribution bed/mattress (bed type), PT/OT evaluation    Nutrition Interventions: Offer support with meals,snacks and hydration    Friction and Shear Interventions: HOB 30 degrees or less                Problem: Diabetes Self-Management  Goal: *Disease process and treatment process  Description: Define diabetes and identify own type of diabetes; list 3 options for treating diabetes. Outcome: Progressing Towards Goal     Problem: Patient Education: Go to Patient Education Activity  Goal: Patient/Family Education  Outcome: Progressing Towards Goal     Problem: Diabetes Self-Management  Goal: *Disease process and treatment process  Description: Define diabetes and identify own type of diabetes; list 3 options for treating diabetes.   Outcome: Progressing Towards Goal     Problem: Patient Education: Go to Patient Education Activity  Goal: Patient/Family Education  Outcome: Progressing Towards Goal

## 2020-05-30 NOTE — PROGRESS NOTES
Hospitalist Progress Note    Subjective:   Daily Progress Note: 5/30/2020 10:04 AM    Patient admitted 12/12/19 with acute right side embolic stroke with left side residual weakness and left facial droop. CT brain on admission with indeterminate infarctions within the left corona radiata/caudate.  CTA of the head and neck with stenosis at the distal segment of the right  Vertebral artery and multifocal stenosis in the P2 segment of the left posterior cerebral artery.  MRI brain with acute to early subacute infarcts in the left cerebellar hemisphere in the periventricular deep left frontoparietal white matter and likely additional areas of restricted diffusion in the right paramedian yariel. Echo with + PFO, on statin and ASA.  Will need event monitor on discharge and if no arrhythmia, PFO closure recommended.  Wife informs CM she does not want patient at home, they were planning to separate prior to this stroke.  CM attempting  to place in STR, medicaid pending.    4/9:  Speech remains slightly slurred, SLP signing off as clinical indication no longer needed.    4/10: Mag critically low today: 1.3. Replaced    5/2:  Fitted for AFO.  Continued wait for medicaid and SSI approval.  Metformin dosing adjusted a few days ago.  Glucose 118-126 x 24 hours.  CM spoke with wife, still does not want patient to come home, even if he has to go to a shelter.    5/11:  Still waiting for disability rating, Medicaid. . Ambulated 400 feet.    5/21:  OOB most of day.  Core weak per PT, needs continued strength and balance training. Buelah Lusty in halls much of day.      5/22:  Conversant today. Would like to set up meeting with wife prior to discharge. Still holding out hope for reconciliation. Pt spoke with her on phone today, she continues to refuse to tell him where she is living. Has not spoken to step son. Seems to have some unrealistic expectations at this juncture.   Spoke with HN, apparently CM still attempting to involve wife to no avail.      :  Status quo, continues to reflect on marriage. Current Facility-Administered Medications   Medication Dose Route Frequency    magnesium oxide (MAG-OX) tablet 400 mg  400 mg Oral BID    metFORMIN (GLUCOPHAGE) tablet 500 mg  500 mg Oral BID WITH MEALS    acetaminophen (TYLENOL) tablet 650 mg  650 mg Oral Q4H PRN    diphenhydrAMINE (BENADRYL) capsule 25 mg  25 mg Oral Q6H PRN    acetaminophen (TYLENOL) tablet 650 mg  650 mg Oral Q8H    lip protectant (BLISTEX) ointment 1 Each  1 Each Topical PRN    metoprolol succinate (TOPROL-XL) XL tablet 25 mg  25 mg Oral DAILY    polyethylene glycol (MIRALAX) packet 17 g  17 g Oral DAILY PRN    guaiFENesin (ROBITUSSIN) 100 mg/5 mL oral liquid 100 mg  100 mg Oral Q4H PRN    hydrALAZINE (APRESOLINE) 20 mg/mL injection 20 mg  20 mg IntraVENous Q4H PRN    atorvastatin (LIPITOR) tablet 80 mg  80 mg Oral QHS    lisinopril (PRINIVIL, ZESTRIL) tablet 40 mg  40 mg Oral DAILY    sodium chloride (NS) flush 5-40 mL  5-40 mL IntraVENous PRN    ondansetron (ZOFRAN) injection 4 mg  4 mg IntraVENous Q4H PRN    aspirin chewable tablet 81 mg  81 mg Oral DAILY    enoxaparin (LOVENOX) injection 40 mg  40 mg SubCUTAneous Q24H    dextrose 40% (GLUTOSE) oral gel 1 Tube  15 g Oral PRN    glucagon (GLUCAGEN) injection 1 mg  1 mg IntraMUSCular PRN    dextrose (D50W) injection syrg 12.5-25 g  25-50 mL IntraVENous PRN        Review of Systems  A comprehensive review of systems was negative except for that written in the HPI. Objective:     Visit Vitals  /76 (BP 1 Location: Right arm, BP Patient Position: At rest)   Pulse 76   Temp 97.9 °F (36.6 °C)   Resp 18   Ht 5' 6\" (1.676 m)   Wt 79.8 kg (175 lb 14.4 oz)   SpO2 96%   BMI 28.39 kg/m²      O2 Device: Room air    Temp (24hrs), Av °F (36.7 °C), Min:97.6 °F (36.4 °C), Max:98.3 °F (36.8 °C)    1901 -  0700  In: 222 [P.O.:222]  Out: -      General appearance: Conversant, oriented.  Denies need or new issues.    Head: Normocephalic, without obvious abnormality, atraumatic  Eyes: conjunctivae/corneas clear. PERRL  Throat: Continued mild left facial weakness.  Lips, mucosa, and tongue normal. Teeth and gums normal.  Eating normally. Neck: supple, symmetrical, trachea midline, no JVD  Lungs: clear to auscultation bilaterally  Heart: regular rate and rhythm, S1, S2 normal, no murmur, click, rub or gallop  Abdomen: soft, non-tender. Bowel sounds normal. No masses,  no organomegaly  Extremities: Persistent right side upper and lower residual weakness, improved since admission.  Using brace. Skin: Skin color, texture, turgor normal. No rashes or lesions  Neurologic: As above. Data Review  Recent Results (from the past 24 hour(s))   METABOLIC PANEL, BASIC    Collection Time: 05/30/20  5:56 AM   Result Value Ref Range    Sodium 142 136 - 145 mmol/L    Potassium 4.5 3.5 - 5.1 mmol/L    Chloride 111 (H) 98 - 107 mmol/L    CO2 26 21 - 32 mmol/L    Anion gap 5 (L) 7 - 16 mmol/L    Glucose 102 (H) 65 - 100 mg/dL    BUN 24 (H) 6 - 23 MG/DL    Creatinine 1.17 0.8 - 1.5 MG/DL    GFR est AA >60 >60 ml/min/1.73m2    GFR est non-AA >60 >60 ml/min/1.73m2    Calcium 9.1 8.3 - 10.4 MG/DL   CBC WITH AUTOMATED DIFF    Collection Time: 05/30/20  5:56 AM   Result Value Ref Range    WBC 5.2 4.3 - 11.1 K/uL    RBC 3.94 (L) 4.23 - 5.6 M/uL    HGB 10.8 (L) 13.6 - 17.2 g/dL    HCT 33.3 (L) 41.1 - 50.3 %    MCV 84.5 79.6 - 97.8 FL    MCH 27.4 26.1 - 32.9 PG    MCHC 32.4 31.4 - 35.0 g/dL    RDW 15.9 (H) 11.9 - 14.6 %    PLATELET 240 476 - 215 K/uL    MPV 11.4 9.4 - 12.3 FL    ABSOLUTE NRBC 0.00 0.0 - 0.2 K/uL    DF AUTOMATED      NEUTROPHILS 44 43 - 78 %    LYMPHOCYTES 47 (H) 13 - 44 %    MONOCYTES 6 4.0 - 12.0 %    EOSINOPHILS 2 0.5 - 7.8 %    BASOPHILS 1 0.0 - 2.0 %    IMMATURE GRANULOCYTES 0 0.0 - 5.0 %    ABS. NEUTROPHILS 2.3 1.7 - 8.2 K/UL    ABS. LYMPHOCYTES 2.5 0.5 - 4.6 K/UL    ABS. MONOCYTES 0.3 0.1 - 1.3 K/UL    ABS. EOSINOPHILS 0.1 0.0 - 0.8 K/UL    ABS. BASOPHILS 0.0 0.0 - 0.2 K/UL    ABS. IMM. GRANS. 0.0 0.0 - 0.5 K/UL       Assessment/Plan:    Acute to early subacute infarcts in the left cerebellar hemisphere, in the periventricular deep left frontoparietal white matter and likely additional areas of restricted diffusion in the right paramedian yariel.  Old lacunar infarcts also.                  Making good progress with PT/OT                Speech and swallowing improved to the extent             SLP signed off                 Pending placement after approved for SSI                   and Medicaid     Dysarthria due to stroke:  Improved immensely, able to understand all speech.               SLP releasing 4/14      Difficult placement with marital discord prior to stroke:  Wife does not want him at home:  CM working on tirelessly              Now issues obtaining MR for               [employer], needs SSI              CM spoke with wife 5/1, she still does not want  patient to come  home. Patient spoke with her  again 5/19, does not even know where she lives     Hypertension:  Continue lisinopril, toprol XL     NIDDM II:  Continue glucophage, diabetic diet.  A1C:  8.4              DM education and  management continues to  follow intermittently,  increased glucophage  frequency 4/30              Discontinue SSI      Arrhythmia:  On beta blocker     PFO 12/17/2019:                Will need 4 week event monitor on  discharge               Will need PFO closure at some point after  discharge per Cards      Hypomagnesemia:  Replace and recheck               Increasing daily 400 mg dose to bid 5/2.             monitor weekly    Add on to labs 5/30    Care Plan discussed with: Patient and Nurse    Signed By: Rogelio Borjas NP     May 30, 2020

## 2020-05-31 PROCEDURE — 74011250637 HC RX REV CODE- 250/637: Performed by: INTERNAL MEDICINE

## 2020-05-31 PROCEDURE — 65270000029 HC RM PRIVATE

## 2020-05-31 PROCEDURE — 74011250637 HC RX REV CODE- 250/637: Performed by: NURSE PRACTITIONER

## 2020-05-31 PROCEDURE — 74011250636 HC RX REV CODE- 250/636: Performed by: INTERNAL MEDICINE

## 2020-05-31 PROCEDURE — 74011250637 HC RX REV CODE- 250/637: Performed by: HOSPITALIST

## 2020-05-31 RX ADMIN — LISINOPRIL 40 MG: 20 TABLET ORAL at 09:25

## 2020-05-31 RX ADMIN — ACETAMINOPHEN 650 MG: 325 TABLET, FILM COATED ORAL at 05:13

## 2020-05-31 RX ADMIN — ACETAMINOPHEN 650 MG: 325 TABLET, FILM COATED ORAL at 14:18

## 2020-05-31 RX ADMIN — ATORVASTATIN CALCIUM 80 MG: 80 TABLET, FILM COATED ORAL at 22:11

## 2020-05-31 RX ADMIN — ACETAMINOPHEN 650 MG: 325 TABLET, FILM COATED ORAL at 22:11

## 2020-05-31 RX ADMIN — ENOXAPARIN SODIUM 40 MG: 40 INJECTION SUBCUTANEOUS at 14:19

## 2020-05-31 RX ADMIN — Medication 400 MG: at 09:25

## 2020-05-31 RX ADMIN — ASPIRIN 81 MG 81 MG: 81 TABLET ORAL at 09:24

## 2020-05-31 RX ADMIN — METOPROLOL SUCCINATE 25 MG: 25 TABLET, FILM COATED, EXTENDED RELEASE ORAL at 09:25

## 2020-05-31 RX ADMIN — DIPHENHYDRAMINE HYDROCHLORIDE 25 MG: 25 CAPSULE ORAL at 17:18

## 2020-05-31 RX ADMIN — METFORMIN HYDROCHLORIDE 500 MG: 500 TABLET ORAL at 17:18

## 2020-05-31 RX ADMIN — METFORMIN HYDROCHLORIDE 500 MG: 500 TABLET ORAL at 09:25

## 2020-05-31 RX ADMIN — GUAIFENESIN 100 MG: 100 SOLUTION ORAL at 17:18

## 2020-05-31 RX ADMIN — Medication 400 MG: at 17:18

## 2020-05-31 NOTE — PROGRESS NOTES
Problem: Patient Education: Go to Patient Education Activity  Goal: Patient/Family Education  Outcome: Progressing Towards Goal     Problem: Pressure Injury - Risk of  Goal: *Prevention of pressure injury  Description: Document Dewey Scale and appropriate interventions in the flowsheet. Outcome: Progressing Towards Goal  Note: Pressure Injury Interventions:  Sensory Interventions: Assess changes in LOC, Assess need for specialty bed    Moisture Interventions: Absorbent underpads, Check for incontinence Q2 hours and as needed, Limit adult briefs, Minimize layers    Activity Interventions: Increase time out of bed, Pressure redistribution bed/mattress(bed type), PT/OT evaluation    Mobility Interventions: HOB 30 degrees or less, Pressure redistribution bed/mattress (bed type), PT/OT evaluation    Nutrition Interventions: Document food/fluid/supplement intake    Friction and Shear Interventions: HOB 30 degrees or less                Problem: Patient Education: Go to Patient Education Activity  Goal: Patient/Family Education  Outcome: Progressing Towards Goal     Problem: Falls - Risk of  Goal: *Absence of Falls  Description: Document Jeanne Fall Risk and appropriate interventions in the flowsheet.   Outcome: Progressing Towards Goal  Note: Fall Risk Interventions:  Mobility Interventions: Bed/chair exit alarm, Communicate number of staff needed for ambulation/transfer, OT consult for ADLs, Patient to call before getting OOB, PT Consult for mobility concerns    Mentation Interventions: Bed/chair exit alarm, Door open when patient unattended, Adequate sleep, hydration, pain control    Medication Interventions: Bed/chair exit alarm, Patient to call before getting OOB, Teach patient to arise slowly    Elimination Interventions: Bed/chair exit alarm, Call light in reach, Patient to call for help with toileting needs, Stay With Me (per policy), Toilet paper/wipes in reach, Toileting schedule/hourly rounds    History of Falls Interventions: Bed/chair exit alarm, Door open when patient unattended         Problem: Patient Education: Go to Patient Education Activity  Goal: Patient/Family Education  Outcome: Progressing Towards Goal     Problem: Diabetes Self-Management  Goal: *Disease process and treatment process  Description: Define diabetes and identify own type of diabetes; list 3 options for treating diabetes. Outcome: Progressing Towards Goal  Goal: *Incorporating nutritional management into lifestyle  Description: Describe effect of type, amount and timing of food on blood glucose; list 3 methods for planning meals. Outcome: Progressing Towards Goal  Goal: *Incorporating physical activity into lifestyle  Description: State effect of exercise on blood glucose levels. Outcome: Progressing Towards Goal  Goal: *Developing strategies to promote health/change behavior  Description: Define the ABC's of diabetes; identify appropriate screenings, schedule and personal plan for screenings. Outcome: Progressing Towards Goal  Goal: *Using medications safely  Description: State effect of diabetes medications on diabetes; name diabetes medication taking, action and side effects. Outcome: Progressing Towards Goal  Goal: *Monitoring blood glucose, interpreting and using results  Description: Identify recommended blood glucose targets  and personal targets. Outcome: Progressing Towards Goal  Goal: *Prevention, detection, treatment of acute complications  Description: List symptoms of hyper- and hypoglycemia; describe how to treat low blood sugar and actions for lowering  high blood glucose level. Outcome: Progressing Towards Goal  Goal: *Prevention, detection and treatment of chronic complications  Description: Define the natural course of diabetes and describe the relationship of blood glucose levels to long term complications of diabetes.   Outcome: Progressing Towards Goal  Goal: *Developing strategies to address psychosocial issues  Description: Describe feelings about living with diabetes; identify support needed and support network  Outcome: Progressing Towards Goal     Problem: Patient Education: Go to Patient Education Activity  Goal: Patient/Family Education  Outcome: Progressing Towards Goal     Problem: Patient Education: Go to Patient Education Activity  Goal: Patient/Family Education  Outcome: Progressing Towards Goal     Problem: Nutrition Deficit  Goal: *Optimize nutritional status  Outcome: Progressing Towards Goal     Problem: Patient Education: Go to Patient Education Activity  Goal: Patient/Family Education  Outcome: Progressing Towards Goal     Problem: General Medical Care Plan  Goal: *Vital signs within specified parameters  Outcome: Progressing Towards Goal  Goal: *Labs within defined limits  Outcome: Progressing Towards Goal  Goal: *Absence of infection signs and symptoms  Description: Wash hand more often   Outcome: Progressing Towards Goal  Goal: *Optimal pain control at patient's stated goal  Outcome: Progressing Towards Goal  Goal: *Skin integrity maintained  Outcome: Progressing Towards Goal  Goal: *Fluid volume balance  Outcome: Progressing Towards Goal  Goal: *Optimize nutritional status  Outcome: Progressing Towards Goal  Goal: *Anxiety reduced or absent  Outcome: Progressing Towards Goal  Goal: *Progressive mobility and function (eg: ADL's)  Outcome: Progressing Towards Goal     Problem: Patient Education: Go to Patient Education Activity  Goal: Patient/Family Education  Outcome: Progressing Towards Goal     Problem: Patient Education: Go to Patient Education Activity  Goal: Patient/Family Education  Outcome: Progressing Towards Goal     Problem: Patient Education: Go to Patient Education Activity  Goal: Patient/Family Education  Outcome: Progressing Towards Goal     Problem: Patient Education: Go to Patient Education Activity  Goal: Patient/Family Education  Outcome: Progressing Towards Goal     Problem: Ischemic Stroke: Discharge Outcomes  Goal: *Verbalizes anxiety and depression are reduced or absent  Outcome: Progressing Towards Goal  Goal: *Verbalize understanding of risk factor modification(Stroke Metric)  Outcome: Progressing Towards Goal  Goal: *Hemodynamically stable  Outcome: Progressing Towards Goal  Goal: *Absence of aspiration pneumonia  Outcome: Progressing Towards Goal  Goal: *Aware of needed dietary changes  Outcome: Progressing Towards Goal  Goal: *Verbalize understanding of prescribed medications including anti-coagulants, anti-lipid, and/or anti-platelets(Stroke Metric)  Outcome: Progressing Towards Goal  Goal: *Tolerating diet  Outcome: Progressing Towards Goal  Goal: *Aware of follow-up diagnostics related to anticoagulants  Outcome: Progressing Towards Goal  Goal: *Ability to perform ADLs and demonstrates progressive mobility and function  Outcome: Progressing Towards Goal  Goal: *Absence of DVT(Stroke Metric)  Outcome: Progressing Towards Goal  Goal: *Absence of aspiration  Outcome: Progressing Towards Goal  Goal: *Optimal pain control at patient's stated goal  Outcome: Progressing Towards Goal  Goal: *Home safety concerns addressed  Outcome: Progressing Towards Goal  Goal: *Describes available resources and support systems  Outcome: Progressing Towards Goal  Goal: *Verbalizes understanding of activation of EMS(911) for stroke symptoms(Stroke Metric)  Outcome: Progressing Towards Goal  Goal: *Understands and describes signs and symptoms to report to providers(Stroke Metric)  Outcome: Progressing Towards Goal  Goal: *Neurolgocially stable (absence of additional neurological deficits)  Outcome: Progressing Towards Goal  Goal: *Verbalizes importance of follow-up with primary care physician(Stroke Metric)  Outcome: Progressing Towards Goal  Goal: *Smoking cessation discussed,if applicable(Stroke Metric)  Outcome: Progressing Towards Goal  Goal: *Depression screening completed(Stroke Metric)  Outcome: Progressing Towards Goal     Problem: Pain  Goal: *Control of Pain  Outcome: Progressing Towards Goal     Problem: Patient Education: Go to Patient Education Activity  Goal: Patient/Family Education  Outcome: Progressing Towards Goal     Problem: Patient Education: Go to Patient Education Activity  Goal: Patient/Family Education  Outcome: Progressing Towards Goal     Problem: Patient Education: Go to Patient Education Activity  Goal: Patient/Family Education  Outcome: Progressing Towards Goal

## 2020-05-31 NOTE — PROGRESS NOTES
Problem: Pressure Injury - Risk of  Goal: *Prevention of pressure injury  Description: Document Dewey Scale and appropriate interventions in the flowsheet. Outcome: Progressing Towards Goal  Note: Pressure Injury Interventions:  Sensory Interventions: Assess changes in LOC    Moisture Interventions: Offer toileting Q_hr, Moisture barrier    Activity Interventions: Pressure redistribution bed/mattress(bed type), PT/OT evaluation, Increase time out of bed    Mobility Interventions: HOB 30 degrees or less, Pressure redistribution bed/mattress (bed type), PT/OT evaluation    Nutrition Interventions: Offer support with meals,snacks and hydration    Friction and Shear Interventions: HOB 30 degrees or less                Problem: Falls - Risk of  Goal: *Absence of Falls  Description: Document Jeanne Fall Risk and appropriate interventions in the flowsheet. Outcome: Progressing Towards Goal  Note: Fall Risk Interventions:  Mobility Interventions: Assess mobility with egress test, Bed/chair exit alarm, Communicate number of staff needed for ambulation/transfer    Mentation Interventions: Bed/chair exit alarm, Toileting rounds    Medication Interventions: Evaluate medications/consider consulting pharmacy, Bed/chair exit alarm, Assess postural VS orthostatic hypotension    Elimination Interventions: Patient to call for help with toileting needs, Call light in reach    History of Falls Interventions: Door open when patient unattended, Consult care management for discharge planning, Bed/chair exit alarm         Problem: Falls - Risk of  Goal: *Absence of Falls  Description: Document Paula Wilson Fall Risk and appropriate interventions in the flowsheet.   Outcome: Progressing Towards Goal  Note: Fall Risk Interventions:  Mobility Interventions: Assess mobility with egress test, Bed/chair exit alarm, Communicate number of staff needed for ambulation/transfer    Mentation Interventions: Bed/chair exit alarm, Toileting rounds    Medication Interventions: Evaluate medications/consider consulting pharmacy, Bed/chair exit alarm, Assess postural VS orthostatic hypotension    Elimination Interventions: Patient to call for help with toileting needs, Call light in reach    History of Falls Interventions: Door open when patient unattended, Consult care management for discharge planning, Bed/chair exit alarm

## 2020-05-31 NOTE — PROGRESS NOTES
Hospitalist Progress Note    Subjective:   Daily Progress Note: 5/31/2020 10:00 AM    Patient admitted 12/12/19 with acute right side embolic stroke with left side residual weakness and left facial droop. CT brain on admission with indeterminate infarctions within the left corona radiata/caudate.  CTA of the head and neck with stenosis at the distal segment of the right  Vertebral artery and multifocal stenosis in the P2 segment of the left posterior cerebral artery.  MRI brain with acute to early subacute infarcts in the left cerebellar hemisphere in the periventricular deep left frontoparietal white matter and likely additional areas of restricted diffusion in the right paramedian yariel. Echo with + PFO, on statin and ASA.  Will need event monitor on discharge and if no arrhythmia, PFO closure recommended.  Wife informs CM she does not want patient at home, they were planning to separate prior to this stroke.  CM attempting  to place in STR, medicaid pending.    4/9:  Speech remains slightly slurred, SLP signing off as clinical indication no longer needed.    4/10: Mag critically low today: 1.3. Replaced    5/2:  Fitted for AFO.  Continued wait for medicaid and SSI approval.  Metformin dosing adjusted a few days ago.  Glucose 118-126 x 24 hours.  CM spoke with wife, still does not want patient to come home, even if he has to go to a shelter.    5/11:  Still waiting for disability rating, Medicaid. . Ambulated 400 feet.    5/21:  OOB most of day.  Core weak per PT, needs continued strength and balance training.  Up in halls much of day.      5/22:  Conversant today.  Would like to set up meeting with wife prior to discharge. Lexi  holding out hope for reconciliation.  Pt spoke with her on phone today, she continues to refuse to tell him where she is living. Anya not spoken to step son. Nicole Renee to have some unrealistic expectations at this juncture.  Spoke with HN, apparently CM still attempting to involve wife to no avail.   :  Approved for disability    :  Sitting up in bed without complaints. Progressing slowly. ? New functional baseline. Current Facility-Administered Medications   Medication Dose Route Frequency    magnesium oxide (MAG-OX) tablet 400 mg  400 mg Oral BID    metFORMIN (GLUCOPHAGE) tablet 500 mg  500 mg Oral BID WITH MEALS    acetaminophen (TYLENOL) tablet 650 mg  650 mg Oral Q4H PRN    diphenhydrAMINE (BENADRYL) capsule 25 mg  25 mg Oral Q6H PRN    acetaminophen (TYLENOL) tablet 650 mg  650 mg Oral Q8H    lip protectant (BLISTEX) ointment 1 Each  1 Each Topical PRN    metoprolol succinate (TOPROL-XL) XL tablet 25 mg  25 mg Oral DAILY    polyethylene glycol (MIRALAX) packet 17 g  17 g Oral DAILY PRN    guaiFENesin (ROBITUSSIN) 100 mg/5 mL oral liquid 100 mg  100 mg Oral Q4H PRN    hydrALAZINE (APRESOLINE) 20 mg/mL injection 20 mg  20 mg IntraVENous Q4H PRN    atorvastatin (LIPITOR) tablet 80 mg  80 mg Oral QHS    lisinopril (PRINIVIL, ZESTRIL) tablet 40 mg  40 mg Oral DAILY    sodium chloride (NS) flush 5-40 mL  5-40 mL IntraVENous PRN    ondansetron (ZOFRAN) injection 4 mg  4 mg IntraVENous Q4H PRN    aspirin chewable tablet 81 mg  81 mg Oral DAILY    enoxaparin (LOVENOX) injection 40 mg  40 mg SubCUTAneous Q24H    dextrose 40% (GLUTOSE) oral gel 1 Tube  15 g Oral PRN    glucagon (GLUCAGEN) injection 1 mg  1 mg IntraMUSCular PRN    dextrose (D50W) injection syrg 12.5-25 g  25-50 mL IntraVENous PRN        Review of Systems  A comprehensive review of systems was negative except for that written in the HPI.     Objective:     Visit Vitals  /84 (BP 1 Location: Right arm, BP Patient Position: At rest)   Pulse 75   Temp 97.9 °F (36.6 °C)   Resp 18   Ht 5' 6\" (1.676 m)   Wt 79.8 kg (175 lb 14.4 oz)   SpO2 95%   BMI 28.39 kg/m²      O2 Device: Room air    Temp (24hrs), Av °F (36.7 °C), Min:97.9 °F (36.6 °C), Max:98.2 °F (36.8 °C)    1901 -  07  In: 430 [P.O.:430]  Out: -     General appearance: Conversant, oriented. Denies need or new issues.    Head: Normocephalic, without obvious abnormality, atraumatic  Eyes: conjunctivae/corneas clear. PERRL  Throat: Continued mild left facial weakness.  Lips, mucosa, and tongue normal. Teeth and gums normal.  Eating normally. Neck: supple, symmetrical, trachea midline, no JVD  Lungs: clear to auscultation bilaterally  Heart: regular rate and rhythm, S1, S2 normal, no murmur, click, rub or gallop  Abdomen: soft, non-tender. Bowel sounds normal. No masses,  no organomegaly  Extremities: Persistent right side upper and lower residual weakness, improved since admission.  Using brace.    Skin: Skin color, texture, turgor normal. No rashes or lesions  Neurologic: As above. Additional comments: Notes,orders, test results, vitals reviewed    Data Revie   Ref. Range 5/30/2020 05:56   WBC Latest Ref Range: 4.3 - 11.1 K/uL 5.2   RBC Latest Ref Range: 4.23 - 5.6 M/uL 3.94 (L)   HGB Latest Ref Range: 13.6 - 17.2 g/dL 10.8 (L)   HCT Latest Ref Range: 41.1 - 50.3 % 33.3 (L)   MCV Latest Ref Range: 79.6 - 97.8 FL 84.5   MCH Latest Ref Range: 26.1 - 32.9 PG 27.4   MCHC Latest Ref Range: 31.4 - 35.0 g/dL 32.4   RDW Latest Ref Range: 11.9 - 14.6 % 15.9 (H)   PLATELET Latest Ref Range: 150 - 450 K/uL 175   MPV Latest Ref Range: 9.4 - 12.3 FL 11.4   NEUTROPHILS Latest Ref Range: 43 - 78 % 44   LYMPHOCYTES Latest Ref Range: 13 - 44 % 47 (H)   MONOCYTES Latest Ref Range: 4.0 - 12.0 % 6   EOSINOPHILS Latest Ref Range: 0.5 - 7.8 % 2   BASOPHILS Latest Ref Range: 0.0 - 2.0 % 1   IMMATURE GRANULOCYTES Latest Ref Range: 0.0 - 5.0 % 0   DF Latest Units:   AUTOMATED   ABSOLUTE NRBC Latest Ref Range: 0.0 - 0.2 K/uL 0.00   ABS. NEUTROPHILS Latest Ref Range: 1.7 - 8.2 K/UL 2.3   ABS. IMM. GRANS. Latest Ref Range: 0.0 - 0.5 K/UL 0.0   ABS. LYMPHOCYTES Latest Ref Range: 0.5 - 4.6 K/UL 2.5   ABS. MONOCYTES Latest Ref Range: 0.1 - 1.3 K/UL 0.3   ABS. EOSINOPHILS Latest Ref Range: 0.0 - 0.8 K/UL 0.1   ABS. BASOPHILS Latest Ref Range: 0.0 - 0.2 K/UL 0.0   Sodium Latest Ref Range: 136 - 145 mmol/L 142   Potassium Latest Ref Range: 3.5 - 5.1 mmol/L 4.5   Chloride Latest Ref Range: 98 - 107 mmol/L 111 (H)   CO2 Latest Ref Range: 21 - 32 mmol/L 26   Anion gap Latest Ref Range: 7 - 16 mmol/L 5 (L)   Glucose Latest Ref Range: 65 - 100 mg/dL 102 (H)   BUN Latest Ref Range: 6 - 23 MG/DL 24 (H)   Creatinine Latest Ref Range: 0.8 - 1.5 MG/DL 1.17   Calcium Latest Ref Range: 8.3 - 10.4 MG/DL 9.1   GFR est non-AA Latest Ref Range: >60 ml/min/1.73m2 >60   GFR est AA Latest Ref Range: >60 ml/min/1.73m2 >60     Assessment/Plan:   Acute to early subacute infarcts in the left cerebellar hemisphere, in the periventricular deep left frontoparietal white matter and likely additional areas of restricted diffusion in the right paramedian yariel.  Old lacunar infarcts also.                  Making good progress with PT/OT                Speech and swallowing improved to the extent                     SLP signed off                 Pending placement after approved for SSI                              and Medicaid     Dysarthria due to stroke:  Improved immensely, able to understand all speech.               SLP releasing 4/14      Difficult placement with marital discord prior to stroke:  Wife does not want him at home:  CM working on tirelessly              Now issues obtaining MR for                         [employer], needs SSI              CM spoke with wife 5/1, she still does not want        patient to come  home. Patient spoke with her           again 5/19, does not even know where she lives     Hypertension:  Continue lisinopril, toprol XL     NIDDM II:  Continue glucophage, diabetic diet.  A1C:            8.4              DM education and  management continues to          follow intermittently,  increased glucophage          frequency 4/30              Discontinue SSI      Arrhythmia:  On beta blocker     PFO 12/17/2019:                Will need 4 week event monitor on  discharge               Will need PFO closure at some point after    discharge per Cards      Hypomagnesemia:  Replace and recheck               Increasing daily 400 mg dose to bid 5/2.             monitor weekly               Add on to labs 5/30:  Not done     Care Plan discussed with: Patient and Nurse    Signed By: Mo Caraballo NP     May 31, 2020

## 2020-06-01 PROCEDURE — 74011250637 HC RX REV CODE- 250/637: Performed by: INTERNAL MEDICINE

## 2020-06-01 PROCEDURE — 74011250637 HC RX REV CODE- 250/637: Performed by: NURSE PRACTITIONER

## 2020-06-01 PROCEDURE — 74011250636 HC RX REV CODE- 250/636: Performed by: INTERNAL MEDICINE

## 2020-06-01 PROCEDURE — 74011250637 HC RX REV CODE- 250/637: Performed by: HOSPITALIST

## 2020-06-01 PROCEDURE — 65270000029 HC RM PRIVATE

## 2020-06-01 PROCEDURE — 97530 THERAPEUTIC ACTIVITIES: CPT

## 2020-06-01 RX ADMIN — ASPIRIN 81 MG 81 MG: 81 TABLET ORAL at 08:21

## 2020-06-01 RX ADMIN — Medication 400 MG: at 08:21

## 2020-06-01 RX ADMIN — ACETAMINOPHEN 650 MG: 325 TABLET, FILM COATED ORAL at 13:26

## 2020-06-01 RX ADMIN — ATORVASTATIN CALCIUM 80 MG: 80 TABLET, FILM COATED ORAL at 22:04

## 2020-06-01 RX ADMIN — METFORMIN HYDROCHLORIDE 500 MG: 500 TABLET ORAL at 17:12

## 2020-06-01 RX ADMIN — LISINOPRIL 40 MG: 20 TABLET ORAL at 08:20

## 2020-06-01 RX ADMIN — METFORMIN HYDROCHLORIDE 500 MG: 500 TABLET ORAL at 08:21

## 2020-06-01 RX ADMIN — ENOXAPARIN SODIUM 40 MG: 40 INJECTION SUBCUTANEOUS at 13:27

## 2020-06-01 RX ADMIN — METOPROLOL SUCCINATE 25 MG: 25 TABLET, FILM COATED, EXTENDED RELEASE ORAL at 08:21

## 2020-06-01 RX ADMIN — GUAIFENESIN 100 MG: 100 SOLUTION ORAL at 18:06

## 2020-06-01 RX ADMIN — ACETAMINOPHEN 650 MG: 325 TABLET, FILM COATED ORAL at 22:04

## 2020-06-01 RX ADMIN — DIPHENHYDRAMINE HYDROCHLORIDE 25 MG: 25 CAPSULE ORAL at 18:06

## 2020-06-01 RX ADMIN — Medication 400 MG: at 17:12

## 2020-06-01 RX ADMIN — ACETAMINOPHEN 650 MG: 325 TABLET, FILM COATED ORAL at 05:31

## 2020-06-01 NOTE — PROGRESS NOTES
Problem: Mobility Impaired (Adult and Pediatric)  Goal: *Acute Goals and Plan of Care  Description  REMAIN ONGOING ON RE-ASSESSMENT 6/1/2020  1. Patient will perform bed mobility with MODIFIED INDEPENDENCE and 0 verbal cues within 7 treatment days. PROGRESSING; CURRENTLY AT SUPERVISION  2. Patient will perform transfer bed to chair/wheelchair with SUPERVISION within 7 treatment days. PROGRESSING; CURRENTLY AT CGA  3. Patient will demonstrate FAIR+ dynamic balance throughout ambulation within 7 treatment days. PROGRESSING; CURRENTLY AT FAIR  4. Patient demonstrate 3+/5 strength in L LE hip flexion, hip abduction, hip adduction, and knee extension (quad strength) within 7 treatment days. PROGRESSING SEE STRENGTH ASSESSMENT BELOW  5. Patient will ambulate 300ft+ with STAND BY ASSIST and least retrictive assistive device and L LE AFO within 7 treatment days. PROGRESSING CURRENTLY AT CGA FOR 300FT  6. Patient will perform wheelchair mobility 75ft with SUPERVISION and 0 verbal cues within 7 treatment days. PROGRESSING CURRENTLY REQUIRING CGA FOR 50 FT  7. Patient will don/doff LUE sling for protection of L shoulder during transfers/gait with set up within 7 treatment days. PROGRESSING REQUIRING MIN-MODERATE ASSISTANCE  8. Patient will instruct caregiver how to don/doff L LE AFO with independence within 7 treatment days. 9. Patient will tolerate out of bed mobility 4 hours daily to address dynamic sitting balance, promote anterior pelvic tilt, and trunk/core strengthening within 7 treatment days.         PHYSICAL THERAPY: Re-evaluation and PM 6/1/2020  INPATIENT: PT Visit Days : 1  Payor: [id number] / Plan: [address] [place] [address] Avenue / Product Type: Medicaid /       NAME/AGE/GENDER: Dora Neal is a 53 y.o. male   PRIMARY DIAGNOSIS: Acute ischemic stroke ([address] [I63.9]  Cerebrovascular accident (CVA) due to embolism ([address] [N65.2] Acute embolic stroke ([address] Acute embolic stroke ([address]       ICD-10: Treatment Diagnosis:   · Difficulty in walking, Not elsewhere classified (R26.2)  · Hemiplegia and hemiparesis following cerebral infarction affecting left non-dominant side (R40.557)   Precaution/Allergies:  Patient has no known allergies. ASSESSMENT:     Mr. Henriquez is a 53year old admitted R MCA CVA with left hemiparesis. Patient seen today for physical therapy re-assessment to address progression toward acute PT goals. Currently patient demonstrates improved functional quad strength on L LE with proximal weakness in hip flexion abduction remains. Sensation remains intact L LE. Mr. Yvette Lindsey required CGA for bed mobility, CGA to SBA for transfers, and CGA for ambulation with luke walker and gait belt. Goals remain appropriate and ongoing. Treatment today included bed mobility, transfers, and ambulation on level ground: Cues for gait mechanics (avoiding ER on L LE) and improved knee flexion through swing phase. Patient increased his gait distances today with improved functional strength and activity tolerance appreciated. AFO continues to be a good fit and assest to his gait mechanics. Continue with stated plan of care. Continue to recommend increasing independence with bed to chair transfers and encouraging OOB throughout the day to improve trunk and core strength as well as pelvic flexibility. No formal re-assessment charge. Continue with stated plan of care. This section established at most recent assessment   PROBLEM LIST (Impairments causing functional limitations):  1. Decreased Strength  2. Decreased ADL/Functional Activities  3. Decreased Transfer Abilities  4. Decreased Ambulation Ability/Technique  5. Decreased Balance  6. Increased Pain  7. Decreased Activity Tolerance  8. Increased Fatigue  9. Decreased Flexibility/Joint Mobility   INTERVENTIONS PLANNED: (Benefits and precautions of physical therapy have been discussed with the patient.)  1. Balance Exercise  2. Bed Mobility  3.  Family Education  4. Gait Training  5. Home Exercise Program (HEP)  6. Manual Therapy  7. Neuromuscular Re-education/Strengthening  8. Range of Motion (ROM)  9. Therapeutic Activites  10. Therapeutic Exercise/Strengthening  11. Transfer Training     TREATMENT PLAN: Frequency/Duration: 3 times a week for duration of hospital stay  Rehabilitation Potential For Stated Goals: Good     REHAB RECOMMENDATIONS (at time of discharge pending progress):    Placement: It is my opinion, based on this patient's performance to date, that Mr. Brianna Mcgarry may benefit from intensive therapy at an [address] [place] [address] after discharge due to a probable need for close medical supervision by a rehab physician, a probable need for multiple therapy disciplines and potential to make ongoing and sustainable functional improvement that is of practical value. .  Equipment:    TBD pending progress with therapy. HISTORY:   History of Present Injury/Illness (Reason for Referral):  Per H&P: \"Pt is a 52 y/o smoker with DM, HTN, who presented to ER with L leg and arm weakness, L facial droop, dysarthria. First noted L leg weakness late night 12/11 when he woke up to go to the bathroom. He was normal when he went to bed around 1030. Woke up this morning and had persistent weakness L leg and also now noted in L arm. EMS called. Noted to have slurred speech and L facial droop as well. Code S called in ER around 9am.  MRI with acute infarcts in L cerebellar hemisphere, deep frontoparietal white matter, and R paramedian yariel. Also noted old lacunar infarcts. No large vessel occlusion on CTA, but some stenosis noted. No hx afib, TIA, CVA. No CP, palpitations, SOB. \"  Past Medical History/Comorbidities:   Mr. Brianna Mcgarry  has a past medical history of Acute ischemic stroke ([address] (12/12/2019), Acute pancreatitis (11/19/2014), Cerebrovascular accident (CVA) due to embolism ([address] (12/12/2019), Diabetes ([address] (2002), Diabetes ([address] Diabetes mellitus, and Hypertension. He also has no past medical history of Arthritis, Asthma, Autoimmune disease ([facility] CAD (coronary artery disease), Cancer ([facility] Chronic kidney disease, COPD, Dementia, Dementia ([facility] Heart failure ([facility] Ill-defined condition, Infectious disease, Liver disease, Other ill-defined conditions(799.89), Psychiatric disorder, PUD (peptic ulcer disease), Seizures ([facility] or Sleep disorder. Mr. Rui Courtney  has a past surgical history that includes hx hernia repair and hx orthopaedic. Social History/Living Environment:   Home Environment: Apartment  # Steps to Enter: 12  Rails to Enter: Yes  [employer] : Bilateral  One/Two Story Residence: One story  Living Alone: No  Support Systems: Spouse/Significant Other/Partner  Patient Expects to be Discharged to[de-identified] Unknown  Current DME Used/Available at Home: None  Tub or Shower Type: Tub/Shower combination  Prior Level of Function/Work/Activity:  Independent, lives with wife in 2nd story 1 level apartment. No recent falls.    Number of Personal Factors/Comorbidities that affect the Plan of Care: 1-2: MODERATE COMPLEXITY   EXAMINATION:   Most Recent Physical Functioning:   Gross Assessment: left hip grossly 2/5 to 3-/5  AROM: Generally decreased, functional  PROM: Within functional limits  Strength: Generally decreased, functional  Coordination: Generally decreased, functional  Tone: Abnormal(L UE; L LE)  Sensation: Intact(Light touch  L LE)                    Balance:  Sitting: Impaired  Sitting - Static: Good (unsupported)  Sitting - Dynamic: Fair (occasional)(+)  Standing: Impaired  Standing - Static: Good  Standing - Dynamic : Fair Bed Mobility:  Supine to Sit: Contact guard assistance  Sit to Supine: Modified independent  Scooting: Supervision  Wheelchair Mobility: NT     Transfers:  Sit to Stand: Contact guard assistance  Stand to Sit: Stand-by assistance  Gait:     Base of Support: Center of gravity altered;Shift to right  Speed/Clotilde: Slow  Gait Abnormalities: Hemiplegic;Trunk sway increased  Distance (ft): 500 Feet (ft)  Assistive Device: Walker luke  Ambulation - Level of Assistance: Contact guard assistance;Stand-by assistance  Interventions: Safety awareness training; Tactile cues; Verbal cues      Body Structures Involved:  1. Nerves  2. Voice/Speech  3. Bones  4. Joints  5. Muscles Body Functions Affected:  1. Mental  2. Sensory/Pain  3. Neuromusculoskeletal  4. Movement Related Activities and Participation Affected:  1. General Tasks and Demands  2. Mobility  3. Self Care  4. Interpersonal Interactions and Relationships   Number of elements that affect the Plan of Care: 4+: HIGH COMPLEXITY   CLINICAL PRESENTATION:   Presentation: Evolving clinical presentation with changing clinical characteristics: MODERATE COMPLEXITY   CLINICAL DECISION MAKIN [facility] Inpatient Short Form  How much difficulty does the patient currently have. .. Unable A Lot A Little None   1. Turning over in bed (including adjusting bedclothes, sheets and blankets)? [] 1   [] 2   [] 3   [x] 4   2. Sitting down on and standing up from a chair with arms ( e.g., wheelchair, bedside commode, etc.)   [] 1   [] 2   [x] 3   [] 4   3. Moving from lying on back to sitting on the side of the bed? [] 1   [] 2   [] 3   [x] 4   How much help from another person does the patient currently need. .. Total A Lot A Little None   4. Moving to and from a bed to a chair (including a wheelchair)? [] 1   [] 2   [x] 3   [] 4   5. Need to walk in hospital room? [] 1   [] 2   [x] 3   [] 4   6. Climbing 3-5 steps with a railing? [] 1   [x] 2   [] 3   [] 4   © , Trustees of [address] [place] [address] [address] [address], under license to [employer]. All rights reserved      Score:  Initial: 13 Most Recent: 19 (Date: 2020)    Interpretation of Tool:  Represents activities that are increasingly more difficult (i.e. Bed mobility, Transfers, Gait).     Medical Necessity:     · Patient is expected to demonstrate progress in   · strength, range of motion, balance, coordination, and functional technique  ·  to   · increase independence with all mobility. · .  Reason for Services/Other Comments:  · Patient continues to require skilled intervention due to   · medical complications and mobility deficits which impact his level of function, safety, and independence as indicated above. · .   Use of outcome tool(s) and clinical judgement create a POC that gives a: Questionable prediction of patient's progress: MODERATE COMPLEXITY        TREATMENT:      Pre-treatment Symptoms/Complaints: see above  Pain: Initial: 0/10 Post Session:  0/10       Therapeutic Activity: (    40 Minutes): Therapeutic activities including bed mobility training, transfer training, ambulation on level ground, static/dynamic sitting and standing balance, instruction in sequencing with luke walker and gait mechanics, and patient education to improve mobility, strength and balance. Required moderate Safety awareness training; Tactile cues; Verbal cues to promote static and dynamic balance in standing, promote coordination of bilateral, lower extremity(s) and promote motor control of bilateral, lower extremity(s). Braces/Orthotics/Lines/Etc:   · L LE AFO  Treatment/Session Assessment:    · Response to Treatment:  See above  · Interdisciplinary Collaboration:   o Physical Therapist  o Registered Nurse  o Rehabilitation Attendant  · After treatment position/precautions:   o Supine in bed  o Bed alarm/tab alert on  o Bed/Chair-wheels locked  o Bed in low position  o Call light within reach  o RN notified  o Side rails x 3  o PCT at bedside   · Compliance with Program/Exercises: Compliant all of the time  · Recommendations/Intent for next treatment session: \"Next visit will focus on advancements to more challenging activities and reduction in assistance provided\".     Total Treatment Duration:  PT Patient Time In/Time Out  Time In: 6924  Time Out: 0125 Edyta Coto, DPT

## 2020-06-01 NOTE — DIABETES MGMT
Patient awaiting placement. Lab blood glucose 5/30 was 102. Creatinine 1.17. GFR >60. Reviewed patient current regimen: Metformin 500mg BID. Will continue to follow along loosely.

## 2020-06-01 NOTE — PROGRESS NOTES
Problem: Patient Education: Go to Patient Education Activity  Goal: Patient/Family Education  6/1/2020 1634 by Lázaro Woodward RN  Outcome: Progressing Towards Goal  6/1/2020 1522 by Lázaro Woodward RN  Outcome: Progressing Towards Goal     Problem: Pressure Injury - Risk of  Goal: *Prevention of pressure injury  Description: Document Dewey Scale and appropriate interventions in the flowsheet.   6/1/2020 1634 by Lázaro Woodward RN  Outcome: Progressing Towards Goal  Note: Pressure Injury Interventions:  Sensory Interventions: Assess changes in LOC    Moisture Interventions: Absorbent underpads, Apply protective barrier, creams and emollients, Minimize layers    Activity Interventions: Increase time out of bed, Pressure redistribution bed/mattress(bed type)    Mobility Interventions: Float heels, HOB 30 degrees or less, Pressure redistribution bed/mattress (bed type)    Nutrition Interventions: Offer support with meals,snacks and hydration    Friction and Shear Interventions: HOB 30 degrees or less             6/1/2020 1522 by Lázaro Woodward RN  Outcome: Progressing Towards Goal  Note: Pressure Injury Interventions:  Sensory Interventions: Assess changes in LOC    Moisture Interventions: Absorbent underpads, Apply protective barrier, creams and emollients, Minimize layers    Activity Interventions: Increase time out of bed, Pressure redistribution bed/mattress(bed type)    Mobility Interventions: Float heels, HOB 30 degrees or less, Pressure redistribution bed/mattress (bed type)    Nutrition Interventions: Offer support with meals,snacks and hydration    Friction and Shear Interventions: HOB 30 degrees or less                Problem: Patient Education: Go to Patient Education Activity  Goal: Patient/Family Education  6/1/2020 1634 by Lázaro Woodward RN  Outcome: Progressing Towards Goal  6/1/2020 1522 by Lázaro Woodward RN  Outcome: Progressing Towards Goal     Problem: Falls - Risk of  Goal: *Absence of Falls  Description: Document Andrew Wright Fall Risk and appropriate interventions in the flowsheet. 6/1/2020 1634 by Edgar Mast RN  Outcome: Progressing Towards Goal  Note: Fall Risk Interventions:  Mobility Interventions: Bed/chair exit alarm, Patient to call before getting OOB    Mentation Interventions: Bed/chair exit alarm, Door open when patient unattended    Medication Interventions: Bed/chair exit alarm, Patient to call before getting OOB    Elimination Interventions: Bed/chair exit alarm, Call light in reach, Patient to call for help with toileting needs    History of Falls Interventions: Bed/chair exit alarm, Door open when patient unattended      6/1/2020 1522 by Edgar Mast RN  Outcome: Progressing Towards Goal  Note: Fall Risk Interventions:  Mobility Interventions: Bed/chair exit alarm, Patient to call before getting OOB    Mentation Interventions: Bed/chair exit alarm, Door open when patient unattended    Medication Interventions: Bed/chair exit alarm, Patient to call before getting OOB    Elimination Interventions: Bed/chair exit alarm, Call light in reach, Patient to call for help with toileting needs    History of Falls Interventions: Bed/chair exit alarm, Door open when patient unattended         Problem: Patient Education: Go to Patient Education Activity  Goal: Patient/Family Education  6/1/2020 1634 by Edgar Mast RN  Outcome: Progressing Towards Goal  6/1/2020 1522 by Edagr Mast RN  Outcome: Progressing Towards Goal     Problem: Diabetes Self-Management  Goal: *Disease process and treatment process  Description: Define diabetes and identify own type of diabetes; list 3 options for treating diabetes.   6/1/2020 1634 by Edgra Mast RN  Outcome: Progressing Towards Goal  6/1/2020 1522 by Edgar Mast RN  Outcome: Progressing Towards Goal  Goal: *Incorporating nutritional management into lifestyle  Description: Describe effect of type, amount and timing of food on blood glucose; list 3 methods for planning meals. 6/1/2020 1634 by Rodney Rice RN  Outcome: Progressing Towards Goal  6/1/2020 1522 by Rodney Rice RN  Outcome: Progressing Towards Goal  Goal: *Incorporating physical activity into lifestyle  Description: State effect of exercise on blood glucose levels. 6/1/2020 1634 by Rodney Rice RN  Outcome: Progressing Towards Goal  6/1/2020 1522 by Rodney Rice RN  Outcome: Progressing Towards Goal  Goal: *Developing strategies to promote health/change behavior  Description: Define the ABC's of diabetes; identify appropriate screenings, schedule and personal plan for screenings. 6/1/2020 1634 by Rodney Rice RN  Outcome: Progressing Towards Goal  6/1/2020 1522 by Rodney Rice RN  Outcome: Progressing Towards Goal  Goal: *Using medications safely  Description: State effect of diabetes medications on diabetes; name diabetes medication taking, action and side effects. 6/1/2020 1634 by Rodney Rice RN  Outcome: Progressing Towards Goal  6/1/2020 1522 by Rodney Rice RN  Outcome: Progressing Towards Goal  Goal: *Monitoring blood glucose, interpreting and using results  Description: Identify recommended blood glucose targets  and personal targets. 6/1/2020 1634 by Rodney Rice RN  Outcome: Progressing Towards Goal  6/1/2020 1522 by Rodney Rice RN  Outcome: Progressing Towards Goal  Goal: *Prevention, detection, treatment of acute complications  Description: List symptoms of hyper- and hypoglycemia; describe how to treat low blood sugar and actions for lowering  high blood glucose level. 6/1/2020 1634 by Rodney Rice RN  Outcome: Progressing Towards Goal  6/1/2020 1522 by Rodney Rice RN  Outcome: Progressing Towards Goal  Goal: *Prevention, detection and treatment of chronic complications  Description: Define the natural course of diabetes and describe the relationship of blood glucose levels to long term complications of diabetes.   6/1/2020 1634 by Rodney Rice RN  Outcome: Progressing Towards Goal  6/1/2020 1522 by Ashish Valdez RN  Outcome: Progressing Towards Goal  Goal: *Developing strategies to address psychosocial issues  Description: Describe feelings about living with diabetes; identify support needed and support network  6/1/2020 1634 by Ashish Valdez RN  Outcome: Progressing Towards Goal  6/1/2020 1522 by Ashish Valdez RN  Outcome: Progressing Towards Goal     Problem: Patient Education: Go to Patient Education Activity  Goal: Patient/Family Education  6/1/2020 1634 by Ashish Valdez, RN  Outcome: Progressing Towards Goal  6/1/2020 1522 by Ashish Valdez RN  Outcome: Progressing Towards Goal     Problem: Patient Education: Go to Patient Education Activity  Goal: Patient/Family Education  6/1/2020 1634 by Ashish Valdez, RN  Outcome: Progressing Towards Goal  6/1/2020 1522 by Ashish Valdez RN  Outcome: Progressing Towards Goal     Problem: Nutrition Deficit  Goal: *Optimize nutritional status  6/1/2020 1634 by Ashish Valdez, RN  Outcome: Progressing Towards Goal  6/1/2020 1522 by Ashish Valdez RN  Outcome: Progressing Towards Goal     Problem: Patient Education: Go to Patient Education Activity  Goal: Patient/Family Education  6/1/2020 1634 by Ashish Valdez, RN  Outcome: Progressing Towards Goal  6/1/2020 1522 by Ashish Valdez RN  Outcome: Progressing Towards Goal     Problem: General Medical Care Plan  Goal: *Vital signs within specified parameters  6/1/2020 1634 by Ashish Valdez, RN  Outcome: Progressing Towards Goal  6/1/2020 1522 by Ashish Valdez RN  Outcome: Progressing Towards Goal  Goal: *Labs within defined limits  6/1/2020 1634 by Ashish Valdez, RN  Outcome: Progressing Towards Goal  6/1/2020 1522 by Ashish Valdez RN  Outcome: Progressing Towards Goal  Goal: *Absence of infection signs and symptoms  Description: Wash hand more often   6/1/2020 1634 by Ashish Valdez, RN  Outcome: Progressing Towards Goal  6/1/2020 1522 by Ashish Valdez RN  Outcome: Progressing Towards Goal  Goal: *Optimal pain control at patient's stated goal  6/1/2020 1634 by Michael Luna RN  Outcome: Progressing Towards Goal  6/1/2020 1522 by Michael Luna RN  Outcome: Progressing Towards Goal  Goal: *Skin integrity maintained  6/1/2020 1634 by Michael Luna RN  Outcome: Progressing Towards Goal  6/1/2020 1522 by Michael Luna RN  Outcome: Progressing Towards Goal  Goal: *Fluid volume balance  6/1/2020 1634 by Michael Luna RN  Outcome: Progressing Towards Goal  6/1/2020 1522 by Michael Luna RN  Outcome: Progressing Towards Goal  Goal: *Optimize nutritional status  6/1/2020 1634 by Michael Luna RN  Outcome: Progressing Towards Goal  6/1/2020 1522 by Michael Luna RN  Outcome: Progressing Towards Goal  Goal: *Anxiety reduced or absent  6/1/2020 1634 by Michael Luna RN  Outcome: Progressing Towards Goal  6/1/2020 1522 by Michael Luna RN  Outcome: Progressing Towards Goal  Goal: *Progressive mobility and function (eg: ADL's)  6/1/2020 1634 by Michael Lnua RN  Outcome: Progressing Towards Goal  6/1/2020 1522 by Michael Luna RN  Outcome: Progressing Towards Goal     Problem: Patient Education: Go to Patient Education Activity  Goal: Patient/Family Education  6/1/2020 1634 by Michael Luna RN  Outcome: Progressing Towards Goal  6/1/2020 1522 by Michael Luna RN  Outcome: Progressing Towards Goal     Problem: Patient Education: Go to Patient Education Activity  Goal: Patient/Family Education  6/1/2020 1634 by Michael Luna RN  Outcome: Progressing Towards Goal  6/1/2020 1522 by Michael Luna RN  Outcome: Progressing Towards Goal     Problem: Patient Education: Go to Patient Education Activity  Goal: Patient/Family Education  6/1/2020 1634 by Michael Luna RN  Outcome: Progressing Towards Goal  6/1/2020 1522 by Michael Luna RN  Outcome: Progressing Towards Goal     Problem: Patient Education: Go to Patient Education Activity  Goal: Patient/Family Education  6/1/2020 1634 by Juany Darden RN  Outcome: Progressing Towards Goal  6/1/2020 1522 by Juany Darden RN  Outcome: Progressing Towards Goal     Problem: Ischemic Stroke: Discharge Outcomes  Goal: *Verbalizes anxiety and depression are reduced or absent  6/1/2020 1634 by Juany Darden RN  Outcome: Progressing Towards Goal  6/1/2020 1522 by Juany Darden RN  Outcome: Progressing Towards Goal  Goal: *Verbalize understanding of risk factor modification(Stroke Metric)  6/1/2020 1634 by Juany Darden RN  Outcome: Progressing Towards Goal  6/1/2020 1522 by Juany Darden RN  Outcome: Progressing Towards Goal  Goal: *Hemodynamically stable  6/1/2020 1634 by Juany Darden RN  Outcome: Progressing Towards Goal  6/1/2020 1522 by Juany Darden RN  Outcome: Progressing Towards Goal  Goal: *Absence of aspiration pneumonia  6/1/2020 1634 by Juany Darden RN  Outcome: Progressing Towards Goal  6/1/2020 1522 by Juany Darden RN  Outcome: Progressing Towards Goal  Goal: *Aware of needed dietary changes  6/1/2020 1634 by Juany Darden RN  Outcome: Progressing Towards Goal  6/1/2020 1522 by Juany Darden RN  Outcome: Progressing Towards Goal  Goal: *Verbalize understanding of prescribed medications including anti-coagulants, anti-lipid, and/or anti-platelets(Stroke Metric)  6/1/2020 1634 by Juany Darden RN  Outcome: Progressing Towards Goal  6/1/2020 1522 by Juany Darden RN  Outcome: Progressing Towards Goal  Goal: *Tolerating diet  6/1/2020 1634 by Juany Darden RN  Outcome: Progressing Towards Goal  6/1/2020 1522 by Juany Darden RN  Outcome: Progressing Towards Goal  Goal: *Aware of follow-up diagnostics related to anticoagulants  6/1/2020 1634 by Juany Darden RN  Outcome: Progressing Towards Goal  6/1/2020 1522 by Juany Darden RN  Outcome: Progressing Towards Goal  Goal: *Ability to perform ADLs and demonstrates progressive mobility and function  6/1/2020 1634 by Juany Darden RN  Outcome: Progressing Towards Goal  6/1/2020 1522 by Alis Ruiz RN  Outcome: Progressing Towards Goal  Goal: *Absence of DVT(Stroke Metric)  6/1/2020 1634 by Alis Ruiz RN  Outcome: Progressing Towards Goal  6/1/2020 1522 by Alis Ruiz RN  Outcome: Progressing Towards Goal  Goal: *Absence of aspiration  6/1/2020 1634 by Alis Ruiz RN  Outcome: Progressing Towards Goal  6/1/2020 1522 by Alis Ruiz RN  Outcome: Progressing Towards Goal  Goal: *Optimal pain control at patient's stated goal  6/1/2020 1634 by Alis Ruiz RN  Outcome: Progressing Towards Goal  6/1/2020 1522 by Alis Ruiz RN  Outcome: Progressing Towards Goal  Goal: *Home safety concerns addressed  6/1/2020 1634 by Alis Ruiz RN  Outcome: Progressing Towards Goal  6/1/2020 1522 by Alis Ruiz RN  Outcome: Progressing Towards Goal  Goal: *Describes available resources and support systems  6/1/2020 1634 by Alis Ruiz RN  Outcome: Progressing Towards Goal  6/1/2020 1522 by Alis Ruiz RN  Outcome: Progressing Towards Goal  Goal: *Verbalizes understanding of activation of EMS(911) for stroke symptoms(Stroke Metric)  6/1/2020 1634 by Alis Ruiz RN  Outcome: Progressing Towards Goal  6/1/2020 1522 by Alis Ruiz RN  Outcome: Progressing Towards Goal  Goal: *Understands and describes signs and symptoms to report to providers(Stroke Metric)  6/1/2020 1634 by Alis Ruiz RN  Outcome: Progressing Towards Goal  6/1/2020 1522 by Alis Ruiz RN  Outcome: Progressing Towards Goal  Goal: *Neurolgocially stable (absence of additional neurological deficits)  6/1/2020 1634 by Alis Ruiz RN  Outcome: Progressing Towards Goal  6/1/2020 1522 by Alis Ruiz RN  Outcome: Progressing Towards Goal  Goal: *Verbalizes importance of follow-up with primary care physician(Stroke Metric)  6/1/2020 1634 by Alis Ruiz RN  Outcome: Progressing Towards Goal  6/1/2020 1522 by Alis Ruiz RN  Outcome: Progressing Towards Goal  Goal: *Smoking cessation discussed,if applicable(Stroke Metric)  6/1/2020 1634 by Alis Ruiz RN  Outcome: Progressing Towards Goal  6/1/2020 1522 by Alis Ruiz RN  Outcome: Progressing Towards Goal  Goal: *Depression screening completed(Stroke Metric)  6/1/2020 1634 by Alis Ruiz, RN  Outcome: Progressing Towards Goal  6/1/2020 1522 by Alis Ruiz RN  Outcome: Progressing Towards Goal     Problem: Pain  Goal: *Control of Pain  6/1/2020 1634 by Alis Ruiz RN  Outcome: Progressing Towards Goal  6/1/2020 1522 by Alis Ruiz RN  Outcome: Progressing Towards Goal     Problem: Patient Education: Go to Patient Education Activity  Goal: Patient/Family Education  6/1/2020 1634 by Alis Ruiz RN  Outcome: Progressing Towards Goal  6/1/2020 1522 by Alis Ruiz RN  Outcome: Progressing Towards Goal     Problem: Patient Education: Go to Patient Education Activity  Goal: Patient/Family Education  6/1/2020 1634 by Alis Ruiz RN  Outcome: Progressing Towards Goal  6/1/2020 1522 by Alis Ruiz RN  Outcome: Progressing Towards Goal     Problem: Patient Education: Go to Patient Education Activity  Goal: Patient/Family Education  6/1/2020 1634 by Alis Ruiz RN  Outcome: Progressing Towards Goal  6/1/2020 1522 by Alis Ruiz RN  Outcome: Progressing Towards Goal

## 2020-06-02 PROCEDURE — 74011250637 HC RX REV CODE- 250/637: Performed by: NURSE PRACTITIONER

## 2020-06-02 PROCEDURE — 97530 THERAPEUTIC ACTIVITIES: CPT

## 2020-06-02 PROCEDURE — 65270000029 HC RM PRIVATE

## 2020-06-02 PROCEDURE — 74011250637 HC RX REV CODE- 250/637: Performed by: HOSPITALIST

## 2020-06-02 PROCEDURE — 74011250637 HC RX REV CODE- 250/637: Performed by: INTERNAL MEDICINE

## 2020-06-02 PROCEDURE — 74011250636 HC RX REV CODE- 250/636: Performed by: INTERNAL MEDICINE

## 2020-06-02 RX ADMIN — LISINOPRIL 40 MG: 20 TABLET ORAL at 08:44

## 2020-06-02 RX ADMIN — Medication 400 MG: at 08:44

## 2020-06-02 RX ADMIN — Medication 400 MG: at 17:24

## 2020-06-02 RX ADMIN — ACETAMINOPHEN 650 MG: 325 TABLET, FILM COATED ORAL at 22:10

## 2020-06-02 RX ADMIN — ASPIRIN 81 MG 81 MG: 81 TABLET ORAL at 08:44

## 2020-06-02 RX ADMIN — GUAIFENESIN 100 MG: 100 SOLUTION ORAL at 17:25

## 2020-06-02 RX ADMIN — ACETAMINOPHEN 650 MG: 325 TABLET, FILM COATED ORAL at 05:42

## 2020-06-02 RX ADMIN — DIPHENHYDRAMINE HYDROCHLORIDE 25 MG: 25 CAPSULE ORAL at 17:24

## 2020-06-02 RX ADMIN — METFORMIN HYDROCHLORIDE 500 MG: 500 TABLET ORAL at 08:43

## 2020-06-02 RX ADMIN — ATORVASTATIN CALCIUM 80 MG: 80 TABLET, FILM COATED ORAL at 22:10

## 2020-06-02 RX ADMIN — METOPROLOL SUCCINATE 25 MG: 25 TABLET, FILM COATED, EXTENDED RELEASE ORAL at 08:44

## 2020-06-02 RX ADMIN — METFORMIN HYDROCHLORIDE 500 MG: 500 TABLET ORAL at 17:24

## 2020-06-02 RX ADMIN — ENOXAPARIN SODIUM 40 MG: 40 INJECTION SUBCUTANEOUS at 14:02

## 2020-06-02 RX ADMIN — ACETAMINOPHEN 650 MG: 325 TABLET, FILM COATED ORAL at 14:03

## 2020-06-02 NOTE — PROGRESS NOTES
Problem: Patient Education: Go to Patient Education Activity  Goal: Patient/Family Education  Outcome: Progressing Towards Goal     Problem: Pressure Injury - Risk of  Goal: *Prevention of pressure injury  Description: Document Dewey Scale and appropriate interventions in the flowsheet. Outcome: Progressing Towards Goal  Note: Pressure Injury Interventions:  Sensory Interventions: Assess changes in LOC    Moisture Interventions: Absorbent underpads, Check for incontinence Q2 hours and as needed, Limit adult briefs, Minimize layers    Activity Interventions: Increase time out of bed, Pressure redistribution bed/mattress(bed type), PT/OT evaluation    Mobility Interventions: HOB 30 degrees or less, Pressure redistribution bed/mattress (bed type), PT/OT evaluation    Nutrition Interventions: Document food/fluid/supplement intake    Friction and Shear Interventions: HOB 30 degrees or less                Problem: Patient Education: Go to Patient Education Activity  Goal: Patient/Family Education  Outcome: Progressing Towards Goal     Problem: Falls - Risk of  Goal: *Absence of Falls  Description: Document Jeanne Fall Risk and appropriate interventions in the flowsheet.   Outcome: Progressing Towards Goal  Note: Fall Risk Interventions:  Mobility Interventions: Bed/chair exit alarm, Communicate number of staff needed for ambulation/transfer, OT consult for ADLs, Patient to call before getting OOB, PT Consult for mobility concerns    Mentation Interventions: Bed/chair exit alarm, Door open when patient unattended    Medication Interventions: Bed/chair exit alarm, Patient to call before getting OOB, Teach patient to arise slowly    Elimination Interventions: Call light in reach, Bed/chair exit alarm, Patient to call for help with toileting needs, Stay With Me (per policy), Toilet paper/wipes in reach, Toileting schedule/hourly rounds    History of Falls Interventions: Bed/chair exit alarm, Door open when patient unattended, Investigate reason for fall         Problem: Patient Education: Go to Patient Education Activity  Goal: Patient/Family Education  Outcome: Progressing Towards Goal     Problem: Diabetes Self-Management  Goal: *Disease process and treatment process  Description: Define diabetes and identify own type of diabetes; list 3 options for treating diabetes. Outcome: Progressing Towards Goal  Goal: *Incorporating nutritional management into lifestyle  Description: Describe effect of type, amount and timing of food on blood glucose; list 3 methods for planning meals. Outcome: Progressing Towards Goal  Goal: *Incorporating physical activity into lifestyle  Description: State effect of exercise on blood glucose levels. Outcome: Progressing Towards Goal  Goal: *Developing strategies to promote health/change behavior  Description: Define the ABC's of diabetes; identify appropriate screenings, schedule and personal plan for screenings. Outcome: Progressing Towards Goal  Goal: *Using medications safely  Description: State effect of diabetes medications on diabetes; name diabetes medication taking, action and side effects. Outcome: Progressing Towards Goal  Goal: *Monitoring blood glucose, interpreting and using results  Description: Identify recommended blood glucose targets  and personal targets. Outcome: Progressing Towards Goal  Goal: *Prevention, detection, treatment of acute complications  Description: List symptoms of hyper- and hypoglycemia; describe how to treat low blood sugar and actions for lowering  high blood glucose level. Outcome: Progressing Towards Goal  Goal: *Prevention, detection and treatment of chronic complications  Description: Define the natural course of diabetes and describe the relationship of blood glucose levels to long term complications of diabetes.   Outcome: Progressing Towards Goal  Goal: *Developing strategies to address psychosocial issues  Description: Describe feelings about living with diabetes; identify support needed and support network  Outcome: Progressing Towards Goal     Problem: Patient Education: Go to Patient Education Activity  Goal: Patient/Family Education  Outcome: Progressing Towards Goal     Problem: Patient Education: Go to Patient Education Activity  Goal: Patient/Family Education  Outcome: Progressing Towards Goal     Problem: Nutrition Deficit  Goal: *Optimize nutritional status  Outcome: Progressing Towards Goal     Problem: Patient Education: Go to Patient Education Activity  Goal: Patient/Family Education  Outcome: Progressing Towards Goal     Problem: General Medical Care Plan  Goal: *Vital signs within specified parameters  Outcome: Progressing Towards Goal  Goal: *Labs within defined limits  Outcome: Progressing Towards Goal  Goal: *Absence of infection signs and symptoms  Description: Wash hand more often   Outcome: Progressing Towards Goal  Goal: *Optimal pain control at patient's stated goal  Outcome: Progressing Towards Goal  Goal: *Skin integrity maintained  Outcome: Progressing Towards Goal  Goal: *Fluid volume balance  Outcome: Progressing Towards Goal  Goal: *Optimize nutritional status  Outcome: Progressing Towards Goal  Goal: *Anxiety reduced or absent  Outcome: Progressing Towards Goal  Goal: *Progressive mobility and function (eg: ADL's)  Outcome: Progressing Towards Goal     Problem: Patient Education: Go to Patient Education Activity  Goal: Patient/Family Education  Outcome: Progressing Towards Goal     Problem: Patient Education: Go to Patient Education Activity  Goal: Patient/Family Education  Outcome: Progressing Towards Goal     Problem: Patient Education: Go to Patient Education Activity  Goal: Patient/Family Education  Outcome: Progressing Towards Goal     Problem: Patient Education: Go to Patient Education Activity  Goal: Patient/Family Education  Outcome: Progressing Towards Goal     Problem: Ischemic Stroke: Discharge Outcomes  Goal: *Verbalizes anxiety and depression are reduced or absent  Outcome: Progressing Towards Goal  Goal: *Verbalize understanding of risk factor modification(Stroke Metric)  Outcome: Progressing Towards Goal  Goal: *Hemodynamically stable  Outcome: Progressing Towards Goal  Goal: *Absence of aspiration pneumonia  Outcome: Progressing Towards Goal  Goal: *Aware of needed dietary changes  Outcome: Progressing Towards Goal  Goal: *Verbalize understanding of prescribed medications including anti-coagulants, anti-lipid, and/or anti-platelets(Stroke Metric)  Outcome: Progressing Towards Goal  Goal: *Tolerating diet  Outcome: Progressing Towards Goal  Goal: *Aware of follow-up diagnostics related to anticoagulants  Outcome: Progressing Towards Goal  Goal: *Ability to perform ADLs and demonstrates progressive mobility and function  Outcome: Progressing Towards Goal  Goal: *Absence of DVT(Stroke Metric)  Outcome: Progressing Towards Goal  Goal: *Absence of aspiration  Outcome: Progressing Towards Goal  Goal: *Optimal pain control at patient's stated goal  Outcome: Progressing Towards Goal  Goal: *Home safety concerns addressed  Outcome: Progressing Towards Goal  Goal: *Describes available resources and support systems  Outcome: Progressing Towards Goal  Goal: *Verbalizes understanding of activation of EMS(911) for stroke symptoms(Stroke Metric)  Outcome: Progressing Towards Goal  Goal: *Understands and describes signs and symptoms to report to providers(Stroke Metric)  Outcome: Progressing Towards Goal  Goal: *Neurolgocially stable (absence of additional neurological deficits)  Outcome: Progressing Towards Goal  Goal: *Verbalizes importance of follow-up with primary care physician(Stroke Metric)  Outcome: Progressing Towards Goal  Goal: *Smoking cessation discussed,if applicable(Stroke Metric)  Outcome: Progressing Towards Goal  Goal: *Depression screening completed(Stroke Metric)  Outcome: Progressing Towards Goal     Problem: Pain  Goal: *Control of Pain  Outcome: Progressing Towards Goal     Problem: Patient Education: Go to Patient Education Activity  Goal: Patient/Family Education  Outcome: Progressing Towards Goal     Problem: Patient Education: Go to Patient Education Activity  Goal: Patient/Family Education  Outcome: Progressing Towards Goal     Problem: Patient Education: Go to Patient Education Activity  Goal: Patient/Family Education  Outcome: Progressing Towards Goal

## 2020-06-02 NOTE — PROGRESS NOTES
Problem: Mobility Impaired (Adult and Pediatric)  Goal: *Acute Goals and Plan of Care  Description  REMAIN ONGOING ON RE-ASSESSMENT 6/1/2020  1. Patient will perform bed mobility with MODIFIED INDEPENDENCE and 0 verbal cues within 7 treatment days. PROGRESSING; CURRENTLY AT SUPERVISION  2. Patient will perform transfer bed to chair/wheelchair with SUPERVISION within 7 treatment days. PROGRESSING; CURRENTLY AT CGA  3. Patient will demonstrate FAIR+ dynamic balance throughout ambulation within 7 treatment days. PROGRESSING; CURRENTLY AT FAIR  4. Patient demonstrate 3+/5 strength in L LE hip flexion, hip abduction, hip adduction, and knee extension (quad strength) within 7 treatment days. PROGRESSING SEE STRENGTH ASSESSMENT BELOW  5. Patient will ambulate 300ft+ with STAND BY ASSIST and least retrictive assistive device and L LE AFO within 7 treatment days. PROGRESSING CURRENTLY AT CGA FOR 300FT  6. Patient will perform wheelchair mobility 75ft with SUPERVISION and 0 verbal cues within 7 treatment days. PROGRESSING CURRENTLY REQUIRING CGA FOR 50 FT  7. Patient will don/doff LUE sling for protection of L shoulder during transfers/gait with set up within 7 treatment days. PROGRESSING REQUIRING MIN-MODERATE ASSISTANCE  8. Patient will instruct caregiver how to don/doff L LE AFO with independence within 7 treatment days. 9. Patient will tolerate out of bed mobility 4 hours daily to address dynamic sitting balance, promote anterior pelvic tilt, and trunk/core strengthening within 7 treatment days.         PHYSICAL THERAPY: Daily Note and AM 6/2/2020  INPATIENT: PT Visit Days : 2  Payor: [id number] / Plan: [address] [place] [address] Avenue / Product Type: Medicaid /       NAME/AGE/GENDER: Ray Nevarez is a 53 y.o. male   PRIMARY DIAGNOSIS: Acute ischemic stroke ([address] [I63.9]  Cerebrovascular accident (CVA) due to embolism ([address] [X44.7] Acute embolic stroke ([address] Acute embolic stroke ([address]       ICD-10: Treatment Diagnosis:   · Difficulty in walking, Not elsewhere classified (R26.2)  · Hemiplegia and hemiparesis following cerebral infarction affecting left non-dominant side (H32.851)   Precaution/Allergies:  Patient has no known allergies. ASSESSMENT:     Mr. Henriquez is a 53year old admitted R MCA CVA with left hemiparesis. Patient was supine upon contact and agreeable to PT. Patient performs supine to sit with SBA and HOB elevated. Assistance levels with bed mobility vary depending on the day. Therapist donned socks, brace, and shoes with total assist. Patient transfers to standing with CGA and bed slightly elevated. Once standing patient ambulated 450' with luke walker, CGA-SBA, and cues for gait sequencing. Patient returned to his room where he returns to supine with min assist today which was odd because he typically can perform sit to supine with supervision-mod I. Overall slow progress towards physical therapy goals although patient is consistently ambulating 450' now. Patient's goals listed above are still appropriate. Will continue skilled PT to address remaining deficits. This section established at most recent assessment   PROBLEM LIST (Impairments causing functional limitations):  1. Decreased Strength  2. Decreased ADL/Functional Activities  3. Decreased Transfer Abilities  4. Decreased Ambulation Ability/Technique  5. Decreased Balance  6. Increased Pain  7. Decreased Activity Tolerance  8. Increased Fatigue  9. Decreased Flexibility/Joint Mobility   INTERVENTIONS PLANNED: (Benefits and precautions of physical therapy have been discussed with the patient.)  1. Balance Exercise  2. Bed Mobility  3. Family Education  4. Gait Training  5. Home Exercise Program (HEP)  6. Manual Therapy  7. Neuromuscular Re-education/Strengthening  8. Range of Motion (ROM)  9. Therapeutic Activites  10. Therapeutic Exercise/Strengthening  11.  Transfer Training     TREATMENT PLAN: Frequency/Duration: 3 times a week for duration of hospital stay  Rehabilitation Potential For Stated Goals: Good     REHAB RECOMMENDATIONS (at time of discharge pending progress):    Placement: It is my opinion, based on this patient's performance to date, that Mr. Fiorella Urbina may benefit from intensive therapy at an [address] [place] [address] after discharge due to a probable need for close medical supervision by a rehab physician, a probable need for multiple therapy disciplines and potential to make ongoing and sustainable functional improvement that is of practical value. .  Equipment:    TBD pending progress with therapy. HISTORY:   History of Present Injury/Illness (Reason for Referral):  Per H&P: \"Pt is a 52 y/o smoker with DM, HTN, who presented to ER with L leg and arm weakness, L facial droop, dysarthria. First noted L leg weakness late night 12/11 when he woke up to go to the bathroom. He was normal when he went to bed around 1030. Woke up this morning and had persistent weakness L leg and also now noted in L arm. EMS called. Noted to have slurred speech and L facial droop as well. Code S called in ER around 9am.  MRI with acute infarcts in L cerebellar hemisphere, deep frontoparietal white matter, and R paramedian yariel. Also noted old lacunar infarcts. No large vessel occlusion on CTA, but some stenosis noted. No hx afib, TIA, CVA. No CP, palpitations, SOB. \"  Past Medical History/Comorbidities:   Mr. Fiorella Urbina  has a past medical history of Acute ischemic stroke ([address] (12/12/2019), Acute pancreatitis (11/19/2014), Cerebrovascular accident (CVA) due to embolism ([address] (12/12/2019), Diabetes ([address] (2002), Diabetes ([address] Diabetes mellitus, and Hypertension.  He also has no past medical history of Arthritis, Asthma, Autoimmune disease ([address] CAD (coronary artery disease), Cancer ([address] Chronic kidney disease, COPD, Dementia, Dementia ([address] Heart failure ([address] Ill-defined condition, Infectious disease, Liver disease, Other ill-defined conditions(799.89), Psychiatric disorder, PUD (peptic ulcer disease), Seizures ([facility] or Sleep disorder. Mr. Osmar Davey  has a past surgical history that includes hx hernia repair and hx orthopaedic. Social History/Living Environment:   Home Environment: Apartment  # Steps to Enter: 12  Rails to Enter: Yes  [employer] : Bilateral  One/Two Story Residence: One story  Living Alone: No  Support Systems: Spouse/Significant Other/Partner  Patient Expects to be Discharged to[de-identified] Unknown  Current DME Used/Available at Home: None  Tub or Shower Type: Tub/Shower combination  Prior Level of Function/Work/Activity:  Independent, lives with wife in 2nd story 1 level apartment. No recent falls. Number of Personal Factors/Comorbidities that affect the Plan of Care: 1-2: MODERATE COMPLEXITY   EXAMINATION:   Most Recent Physical Functioning:   Gross Assessment: left hip grossly 2/5 to 3-/5                       Balance:  Sitting: Impaired  Sitting - Static: Good (unsupported)  Sitting - Dynamic: Fair (occasional)  Standing: Impaired  Standing - Static: Good  Standing - Dynamic : Fair Bed Mobility:  Supine to Sit: Stand-by assistance  Sit to Supine: Minimum assistance  Wheelchair Mobility: NT     Transfers:  Sit to Stand: Contact guard assistance  Stand to Sit: Contact guard assistance  Gait:     Base of Support: Center of gravity altered;Shift to right  Speed/Clotilde: Slow  Gait Abnormalities: Hemiplegic;Trunk sway increased  Distance (ft): 450 Feet (ft)  Assistive Device: Walker luke  Ambulation - Level of Assistance: Contact guard assistance;Stand-by assistance  Interventions: Safety awareness training; Tactile cues; Verbal cues      Body Structures Involved:  1. Nerves  2. Voice/Speech  3. Bones  4. Joints  5. Muscles Body Functions Affected:  1. Mental  2. Sensory/Pain  3. Neuromusculoskeletal  4. Movement Related Activities and Participation Affected:  1. General Tasks and Demands  2. Mobility  3.  Self Care  4. Interpersonal Interactions and Relationships   Number of elements that affect the Plan of Care: 4+: HIGH COMPLEXITY   CLINICAL PRESENTATION:   Presentation: Evolving clinical presentation with changing clinical characteristics: MODERATE COMPLEXITY   CLINICAL DECISION MAKIN [facility] Inpatient Short Form  How much difficulty does the patient currently have. .. Unable A Lot A Little None   1. Turning over in bed (including adjusting bedclothes, sheets and blankets)? [] 1   [] 2   [] 3   [x] 4   2. Sitting down on and standing up from a chair with arms ( e.g., wheelchair, bedside commode, etc.)   [] 1   [] 2   [x] 3   [] 4   3. Moving from lying on back to sitting on the side of the bed? [] 1   [] 2   [] 3   [x] 4   How much help from another person does the patient currently need. .. Total A Lot A Little None   4. Moving to and from a bed to a chair (including a wheelchair)? [] 1   [] 2   [x] 3   [] 4   5. Need to walk in hospital room? [] 1   [] 2   [x] 3   [] 4   6. Climbing 3-5 steps with a railing? [] 1   [x] 2   [] 3   [] 4   © , Trustees of [address] [place] [address] [address] [id number], under license to [employer]. All rights reserved      Score:  Initial: 13 Most Recent: 19 (Date: 2020)    Interpretation of Tool:  Represents activities that are increasingly more difficult (i.e. Bed mobility, Transfers, Gait). Medical Necessity:     · Patient is expected to demonstrate progress in   · strength, range of motion, balance, coordination, and functional technique  ·  to   · increase independence with all mobility. · .  Reason for Services/Other Comments:  · Patient continues to require skilled intervention due to   · medical complications and mobility deficits which impact his level of function, safety, and independence as indicated above.    · .   Use of outcome tool(s) and clinical judgement create a POC that gives a: Questionable prediction of patient's progress: MODERATE COMPLEXITY        TREATMENT:      Pre-treatment Symptoms/Complaints: see above  Pain: Initial: 0/10 Post Session:  0/10       Therapeutic Activity: (    28 Minutes): Therapeutic activities including bed mobility training, transfer training, ambulation on level ground, static/dynamic sitting and standing balance, instruction in sequencing with luke walker and gait mechanics, and patient education to improve mobility, strength and balance. Required moderate Safety awareness training; Tactile cues; Verbal cues to promote static and dynamic balance in standing, promote coordination of bilateral, lower extremity(s) and promote motor control of bilateral, lower extremity(s). Braces/Orthotics/Lines/Etc:   · L LE AFO  Treatment/Session Assessment:    · Response to Treatment:  See above  · Interdisciplinary Collaboration:   o Physical Therapy Assistant  o Registered Nurse  o Rehabilitation Attendant  · After treatment position/precautions:   o Supine in bed  o Bed alarm/tab alert on  o Bed/Chair-wheels locked  o Bed in low position  o Call light within reach  o RN notified  o Side rails x 3   · Compliance with Program/Exercises: Compliant all of the time  · Recommendations/Intent for next treatment session: \"Next visit will focus on advancements to more challenging activities and reduction in assistance provided\".     Total Treatment Duration:  PT Patient Time In/Time Out  Time In: 1307  Time Out: 142 [facility], [facility]

## 2020-06-02 NOTE — PROGRESS NOTES
Hospitalist Progress Note    Subjective:   Daily Progress Note: 6/1/2020 6173  Patient admitted 12/12/19 with acute right side embolic stroke with left side residual weakness and left facial droop. CT brain on admission with indeterminate infarctions within the left corona radiata/caudate.  CTA of the head and neck with stenosis at the distal segment of the right  Vertebral artery and multifocal stenosis in the P2 segment of the left posterior cerebral artery.  MRI brain with acute to early subacute infarcts in the left cerebellar hemisphere in the periventricular deep left frontoparietal white matter and likely additional areas of restricted diffusion in the right paramedian yariel. Echo with + PFO, on statin and ASA.  Will need event monitor on discharge and if no arrhythmia, PFO closure recommended.  Wife informs CM she does not want patient at home, they were planning to separate prior to this stroke.  CM attempting  to place in STR, medicaid pending.    4/9:  Speech remains slightly slurred, SLP signing off as clinical indication no longer needed.    4/10: Mag critically low today: 1.3. Replaced    5/2:  Fitted for AFO.  Continued wait for medicaid and SSI approval.  Metformin dosing adjusted a few days ago.  Glucose 118-126 x 24 hours.  CM spoke with wife, still does not want patient to come home, even if he has to go to a shelter.    5/11:  Still waiting for disability rating, Medicaid. . Ambulated 400 feet.    5/21:  OOB most of day.  Core weak per PT, needs continued strength and balance training.  Up in halls much of day.      5/22:  Conversant today.  Would like to set up meeting with wife prior to discharge. Katie Pollock holding out hope for reconciliation.  Pt spoke with her on phone today, she continues to refuse to tell him where she is living. Anya not spoken to step son. Wendyis Phoenix to have some unrealistic expectations at this juncture.  Spoke with HN, apparently CM still attempting to involve wife to no avail. :  Approved for disability     :  Sitting up in bed without complaints. Anxious regarding finding a place to live. Discussing possible options. Hoping for rehab in the interim. Current Facility-Administered Medications   Medication Dose Route Frequency    magnesium oxide (MAG-OX) tablet 400 mg  400 mg Oral BID    metFORMIN (GLUCOPHAGE) tablet 500 mg  500 mg Oral BID WITH MEALS    acetaminophen (TYLENOL) tablet 650 mg  650 mg Oral Q4H PRN    diphenhydrAMINE (BENADRYL) capsule 25 mg  25 mg Oral Q6H PRN    acetaminophen (TYLENOL) tablet 650 mg  650 mg Oral Q8H    lip protectant (BLISTEX) ointment 1 Each  1 Each Topical PRN    metoprolol succinate (TOPROL-XL) XL tablet 25 mg  25 mg Oral DAILY    polyethylene glycol (MIRALAX) packet 17 g  17 g Oral DAILY PRN    guaiFENesin (ROBITUSSIN) 100 mg/5 mL oral liquid 100 mg  100 mg Oral Q4H PRN    hydrALAZINE (APRESOLINE) 20 mg/mL injection 20 mg  20 mg IntraVENous Q4H PRN    atorvastatin (LIPITOR) tablet 80 mg  80 mg Oral QHS    lisinopril (PRINIVIL, ZESTRIL) tablet 40 mg  40 mg Oral DAILY    sodium chloride (NS) flush 5-40 mL  5-40 mL IntraVENous PRN    ondansetron (ZOFRAN) injection 4 mg  4 mg IntraVENous Q4H PRN    aspirin chewable tablet 81 mg  81 mg Oral DAILY    enoxaparin (LOVENOX) injection 40 mg  40 mg SubCUTAneous Q24H    dextrose 40% (GLUTOSE) oral gel 1 Tube  15 g Oral PRN    glucagon (GLUCAGEN) injection 1 mg  1 mg IntraMUSCular PRN    dextrose (D50W) injection syrg 12.5-25 g  25-50 mL IntraVENous PRN      Review of Systems  A comprehensive review of systems was negative except for that written in the HPI.     Objective:     Visit Vitals  /82 (BP 1 Location: Right arm, BP Patient Position: At rest)   Pulse 82   Temp 98 °F (36.7 °C)   Resp 18   Ht 5' 6\" (1.676 m)   Wt 79.8 kg (175 lb 14.4 oz)   SpO2 97%   BMI 28.39 kg/m²      O2 Device: Room air    Temp (24hrs), Av.9 °F (36.6 °C), Min:97.4 °F (36.3 °C), Max:98.2 °F (36.8 °C)    General appearance: Conversant, oriented. Denies need or new issues.    Head: Normocephalic, without obvious abnormality, atraumatic  Eyes: conjunctivae/corneas clear. PERRL  Throat: Continued mild left facial weakness.  Lips, mucosa, and tongue normal. Teeth and gums normal.  Eating normally. Neck: supple, symmetrical, trachea midline, no JVD  Lungs: clear to auscultation bilaterally  Heart: regular rate and rhythm, S1, S2 normal, no murmur, click, rub or gallop  Abdomen: soft, non-tender. Bowel sounds normal. No masses,  no organomegaly  Extremities: Persistent right side upper and lower residual weakness, improved since admission.  Using brace.    Skin: Skin color, texture, turgor normal. No rashes or lesions  Neurologic: As above. Additional comments: Notes,orders, test results, vitals reviewed    Data Review   Ref. Range 5/30/2020 05:56   WBC Latest Ref Range: 4.3 - 11.1 K/uL 5.2   RBC Latest Ref Range: 4.23 - 5.6 M/uL 3.94 (L)   HGB Latest Ref Range: 13.6 - 17.2 g/dL 10.8 (L)   HCT Latest Ref Range: 41.1 - 50.3 % 33.3 (L)   MCV Latest Ref Range: 79.6 - 97.8 FL 84.5   MCH Latest Ref Range: 26.1 - 32.9 PG 27.4   MCHC Latest Ref Range: 31.4 - 35.0 g/dL 32.4   RDW Latest Ref Range: 11.9 - 14.6 % 15.9 (H)   PLATELET Latest Ref Range: 150 - 450 K/uL 175   MPV Latest Ref Range: 9.4 - 12.3 FL 11.4   NEUTROPHILS Latest Ref Range: 43 - 78 % 44   LYMPHOCYTES Latest Ref Range: 13 - 44 % 47 (H)   MONOCYTES Latest Ref Range: 4.0 - 12.0 % 6   EOSINOPHILS Latest Ref Range: 0.5 - 7.8 % 2   BASOPHILS Latest Ref Range: 0.0 - 2.0 % 1   IMMATURE GRANULOCYTES Latest Ref Range: 0.0 - 5.0 % 0   DF Latest Units:   AUTOMATED   ABSOLUTE NRBC Latest Ref Range: 0.0 - 0.2 K/uL 0.00   ABS. NEUTROPHILS Latest Ref Range: 1.7 - 8.2 K/UL 2.3   ABS. IMM. GRANS. Latest Ref Range: 0.0 - 0.5 K/UL 0.0   ABS. LYMPHOCYTES Latest Ref Range: 0.5 - 4.6 K/UL 2.5   ABS. MONOCYTES Latest Ref Range: 0.1 - 1.3 K/UL 0.3   ABS.  EOSINOPHILS Latest Ref Range: 0.0 - 0.8 K/UL 0.1   ABS. BASOPHILS Latest Ref Range: 0.0 - 0.2 K/UL 0.0   Sodium Latest Ref Range: 136 - 145 mmol/L 142   Potassium Latest Ref Range: 3.5 - 5.1 mmol/L 4.5   Chloride Latest Ref Range: 98 - 107 mmol/L 111 (H)   CO2 Latest Ref Range: 21 - 32 mmol/L 26   Anion gap Latest Ref Range: 7 - 16 mmol/L 5 (L)   Glucose Latest Ref Range: 65 - 100 mg/dL 102 (H)   BUN Latest Ref Range: 6 - 23 MG/DL 24 (H)   Creatinine Latest Ref Range: 0.8 - 1.5 MG/DL 1.17   Calcium Latest Ref Range: 8.3 - 10.4 MG/DL 9.1   GFR est non-AA Latest Ref Range: >60 ml/min/1.73m2 >60   GFR est AA Latest Ref Range: >60 ml/min/1.73m2      Assessment/Plan:     Acute to early subacute infarcts in the left cerebellar hemisphere, in the periventricular deep left frontoparietal white matter and likely additional areas of restricted diffusion in the right paramedian yariel.  Old lacunar infarcts also.                  Making good progress with PT/OT                Speech and swallowing improved to the extent                     SLP signed off                 Pending placement after approved for SSI                              and Medicaid     Dysarthria due to stroke:  Improved immensely, able to understand all speech.               SLP releasing 4/14      Difficult placement with marital discord prior to stroke:  Wife does not want him at home:  CM working on tirelessly              Now issues obtaining MR for [employer], needs SSI              CM spoke with wife 5/1, she still does not want        patient to come [facility]. Patient spoke with her           again 5/19, does not even know where she lives     Hypertension:  Continue lisinopril, toprol XL     NIDDM II:  Continue glucophage, diabetic diet.  A1C:            8.4              DM education and  management continues to          follow intermittently,  increased glucophage          frequency 4/30              Discontinue SSI      Arrhythmia:  On beta blocker     PFO 12/17/2019:                Will need 4 week event monitor on  discharge               Will need PFO closure at some point after    discharge per Cards      Hypomagnesemia:  Replace and recheck               Increasing daily 400 mg dose to bid 5/2.             monitor weekly               GST on to labs 5/30:  Not done     Care Plan discussed with: Patient     Signed By: Vivien Vargas NP     June 1, 2020

## 2020-06-03 PROCEDURE — 74011250637 HC RX REV CODE- 250/637: Performed by: HOSPITALIST

## 2020-06-03 PROCEDURE — 74011250637 HC RX REV CODE- 250/637: Performed by: INTERNAL MEDICINE

## 2020-06-03 PROCEDURE — 74011250637 HC RX REV CODE- 250/637: Performed by: NURSE PRACTITIONER

## 2020-06-03 PROCEDURE — 74011250636 HC RX REV CODE- 250/636: Performed by: INTERNAL MEDICINE

## 2020-06-03 PROCEDURE — 65270000029 HC RM PRIVATE

## 2020-06-03 RX ADMIN — ACETAMINOPHEN 650 MG: 325 TABLET, FILM COATED ORAL at 05:01

## 2020-06-03 RX ADMIN — Medication 400 MG: at 09:24

## 2020-06-03 RX ADMIN — Medication 400 MG: at 16:49

## 2020-06-03 RX ADMIN — ASPIRIN 81 MG 81 MG: 81 TABLET ORAL at 09:24

## 2020-06-03 RX ADMIN — ACETAMINOPHEN 650 MG: 325 TABLET, FILM COATED ORAL at 22:43

## 2020-06-03 RX ADMIN — ATORVASTATIN CALCIUM 80 MG: 80 TABLET, FILM COATED ORAL at 22:43

## 2020-06-03 RX ADMIN — ENOXAPARIN SODIUM 40 MG: 40 INJECTION SUBCUTANEOUS at 16:49

## 2020-06-03 RX ADMIN — METFORMIN HYDROCHLORIDE 500 MG: 500 TABLET ORAL at 08:00

## 2020-06-03 RX ADMIN — METFORMIN HYDROCHLORIDE 500 MG: 500 TABLET ORAL at 16:49

## 2020-06-03 RX ADMIN — ACETAMINOPHEN 650 MG: 325 TABLET, FILM COATED ORAL at 16:49

## 2020-06-03 RX ADMIN — DIPHENHYDRAMINE HYDROCHLORIDE 25 MG: 25 CAPSULE ORAL at 16:49

## 2020-06-03 RX ADMIN — METOPROLOL SUCCINATE 25 MG: 25 TABLET, FILM COATED, EXTENDED RELEASE ORAL at 09:24

## 2020-06-03 RX ADMIN — LISINOPRIL 40 MG: 20 TABLET ORAL at 09:24

## 2020-06-03 RX ADMIN — GUAIFENESIN 100 MG: 100 SOLUTION ORAL at 16:49

## 2020-06-03 NOTE — PROGRESS NOTES
Hospitalist Progress Note    Subjective:   Daily Progress Note: 6/2/2020 1763    Patient admitted 12/12/19 with acute right side embolic stroke with left side residual weakness and left facial droop. CT brain on admission with indeterminate infarctions within the left corona radiata/caudate.  CTA of the head and neck with stenosis at the distal segment of the right  Vertebral artery and multifocal stenosis in the P2 segment of the left posterior cerebral artery.  MRI brain with acute to early subacute infarcts in the left cerebellar hemisphere in the periventricular deep left frontoparietal white matter and likely additional areas of restricted diffusion in the right paramedian yariel. Echo with + PFO, on statin and ASA.  Will need event monitor on discharge and if no arrhythmia, PFO closure recommended.  Wife informs CM she does not want patient at home, they were planning to separate prior to this stroke.  CM attempting  to place in STR, medicaid pending.    4/9:  Speech remains slightly slurred, SLP signing off as clinical indication no longer needed.    4/10: Mag critically low today: 1.3. Replaced    5/2:  Fitted for AFO.  Continued wait for medicaid and SSI approval.  Metformin dosing adjusted a few days ago.  Glucose 118-126 x 24 hours.  CM spoke with wife, still does not want patient to come home, even if he has to go to a shelter.    5/11:  Still waiting for disability rating, Medicaid. . Ambulated 400 feet.    5/21:  OOB most of day.  Core weak per PT, needs continued strength and balance training.  Up in halls much of day.      5/22:  Conversant today.  Would like to set up meeting with wife prior to discharge. Neftali Burk holding out hope for reconciliation.  Pt spoke with her on phone today, she continues to refuse to tell him where she is living. Anya not spoken to step son. Socomadeline Cuellar to have some unrealistic expectations at this juncture.  Spoke with HN, apparently CM still attempting to involve wife to no avail.   :  Approved for disability     :  Sitting up in bed without complaints. In better spirits today. Making slow progress with PT/OT. Current Facility-Administered Medications   Medication Dose Route Frequency    magnesium oxide (MAG-OX) tablet 400 mg  400 mg Oral BID    metFORMIN (GLUCOPHAGE) tablet 500 mg  500 mg Oral BID WITH MEALS    acetaminophen (TYLENOL) tablet 650 mg  650 mg Oral Q4H PRN    diphenhydrAMINE (BENADRYL) capsule 25 mg  25 mg Oral Q6H PRN    acetaminophen (TYLENOL) tablet 650 mg  650 mg Oral Q8H    lip protectant (BLISTEX) ointment 1 Each  1 Each Topical PRN    metoprolol succinate (TOPROL-XL) XL tablet 25 mg  25 mg Oral DAILY    polyethylene glycol (MIRALAX) packet 17 g  17 g Oral DAILY PRN    guaiFENesin (ROBITUSSIN) 100 mg/5 mL oral liquid 100 mg  100 mg Oral Q4H PRN    hydrALAZINE (APRESOLINE) 20 mg/mL injection 20 mg  20 mg IntraVENous Q4H PRN    atorvastatin (LIPITOR) tablet 80 mg  80 mg Oral QHS    lisinopril (PRINIVIL, ZESTRIL) tablet 40 mg  40 mg Oral DAILY    sodium chloride (NS) flush 5-40 mL  5-40 mL IntraVENous PRN    ondansetron (ZOFRAN) injection 4 mg  4 mg IntraVENous Q4H PRN    aspirin chewable tablet 81 mg  81 mg Oral DAILY    enoxaparin (LOVENOX) injection 40 mg  40 mg SubCUTAneous Q24H    dextrose 40% (GLUTOSE) oral gel 1 Tube  15 g Oral PRN    glucagon (GLUCAGEN) injection 1 mg  1 mg IntraMUSCular PRN    dextrose (D50W) injection syrg 12.5-25 g  25-50 mL IntraVENous PRN        Review of Systems  A comprehensive review of systems was negative except for that written in the HPI. Objective:     Visit Vitals  /80   Pulse 71   Temp 98 °F (36.7 °C)   Resp 20   Ht 5' 6\" (1.676 m)   Wt 79.8 kg (175 lb 14.4 oz)   SpO2 97%   BMI 28.39 kg/m²      O2 Device: Room air    Temp (24hrs), Av °F (36.7 °C), Min:97.5 °F (36.4 °C), Max:98.4 °F (36.9 °C)    General appearance: Conversant, oriented.  Denies need or new issues.    Head: Normocephalic, without obvious abnormality, atraumatic  Eyes: conjunctivae/corneas clear. PERRL  Throat: Continued mild left facial weakness.  Lips, mucosa, and tongue normal. Teeth and gums normal.  Eating normally. Neck: supple, symmetrical, trachea midline, no JVD  Lungs: clear to auscultation bilaterally  Heart: regular rate and rhythm, S1, S2 normal, no murmur, click, rub or gallop  Abdomen: soft, non-tender. Bowel sounds normal. No masses,  no organomegaly  Extremities: Persistent right side upper and lower residual weakness, improved since admission.  Using brace.    Skin: Skin color, texture, turgor normal. No rashes or lesions  Neurologic: As above. Data Review    Ref. Range 5/30/2020 05:56   WBC Latest Ref Range: 4.3 - 11.1 K/uL 5.2   RBC Latest Ref Range: 4.23 - 5.6 M/uL 3.94 (L)   HGB Latest Ref Range: 13.6 - 17.2 g/dL 10.8 (L)   HCT Latest Ref Range: 41.1 - 50.3 % 33.3 (L)   MCV Latest Ref Range: 79.6 - 97.8 FL 84.5   MCH Latest Ref Range: 26.1 - 32.9 PG 27.4   MCHC Latest Ref Range: 31.4 - 35.0 g/dL 32.4   RDW Latest Ref Range: 11.9 - 14.6 % 15.9 (H)   PLATELET Latest Ref Range: 150 - 450 K/uL 175   MPV Latest Ref Range: 9.4 - 12.3 FL 11.4   NEUTROPHILS Latest Ref Range: 43 - 78 % 44   LYMPHOCYTES Latest Ref Range: 13 - 44 % 47 (H)   MONOCYTES Latest Ref Range: 4.0 - 12.0 % 6   EOSINOPHILS Latest Ref Range: 0.5 - 7.8 % 2   BASOPHILS Latest Ref Range: 0.0 - 2.0 % 1   IMMATURE GRANULOCYTES Latest Ref Range: 0.0 - 5.0 % 0   DF Latest Units:   AUTOMATED   ABSOLUTE NRBC Latest Ref Range: 0.0 - 0.2 K/uL 0.00   ABS. NEUTROPHILS Latest Ref Range: 1.7 - 8.2 K/UL 2.3   ABS. IMM. GRANS. Latest Ref Range: 0.0 - 0.5 K/UL 0.0   ABS. LYMPHOCYTES Latest Ref Range: 0.5 - 4.6 K/UL 2.5   ABS. MONOCYTES Latest Ref Range: 0.1 - 1.3 K/UL 0.3   ABS. EOSINOPHILS Latest Ref Range: 0.0 - 0.8 K/UL 0.1   ABS.  BASOPHILS Latest Ref Range: 0.0 - 0.2 K/UL 0.0   Sodium Latest Ref Range: 136 - 145 mmol/L 142   Potassium Latest Ref Range: 3.5 - 5.1 mmol/L 4.5   Chloride Latest Ref Range: 98 - 107 mmol/L 111 (H)   CO2 Latest Ref Range: 21 - 32 mmol/L 26   Anion gap Latest Ref Range: 7 - 16 mmol/L 5 (L)   Glucose Latest Ref Range: 65 - 100 mg/dL 102 (H)   BUN Latest Ref Range: 6 - 23 MG/DL 24 (H)   Creatinine Latest Ref Range: 0.8 - 1.5 MG/DL 1.17   Calcium Latest Ref Range: 8.3 - 10.4 MG/DL 9.1   GFR est non-AA Latest Ref Range: >60 ml/min/1.73m2 >60   GFR est AA Latest Ref Range: >60 ml/min/1.73m2       Assessment/Plan:   Acute to early subacute infarcts in the left cerebellar hemisphere, in the periventricular deep left frontoparietal white matter and likely additional areas of restricted diffusion in the right paramedian yariel.  Old lacunar infarcts also.                  Making good progress with PT/OT                Speech and swallowing improved to the extent                     SLP signed off                 Pending placement after approved for SSI                              and Medicaid     Dysarthria due to stroke:  Improved immensely, able to understand all speech.               SLP releasing 4/14      Difficult placement with marital discord prior to stroke:  Wife does not want him at home:  CM working on tirelessly              Now issues obtaining MR for               medicaid application, needs SSI              CM spoke with wife 5/1, she still does not want        patient to come [facility]. Patient spoke with her           again 5/19, does not even know where she lives     Hypertension:  Continue lisinopril, toprol XL     NIDDM II:  Continue glucophage, diabetic diet.  A1C:            8.4              DM education and  management continues to          follow intermittently,  increased glucophage          frequency 4/30              Discontinue SSI      Arrhythmia:  On beta blocker     PFO 12/17/2019:                Will need 4 week event monitor on  discharge               Will need PFO closure at some point after    discharge per Cards      Hypomagnesemia:  Replace and recheck               Increasing daily 400 mg dose to bid 5/2.             monitor weekly               VEZ on to labs 5/30:  Not done      Care Plan discussed with: Patient and Nurse    Signed By: Paula Joshi NP     June 2, 2020

## 2020-06-04 PROCEDURE — 74011250637 HC RX REV CODE- 250/637: Performed by: INTERNAL MEDICINE

## 2020-06-04 PROCEDURE — 97535 SELF CARE MNGMENT TRAINING: CPT

## 2020-06-04 PROCEDURE — 65270000029 HC RM PRIVATE

## 2020-06-04 PROCEDURE — 74011250636 HC RX REV CODE- 250/636: Performed by: INTERNAL MEDICINE

## 2020-06-04 PROCEDURE — 74011250637 HC RX REV CODE- 250/637: Performed by: NURSE PRACTITIONER

## 2020-06-04 PROCEDURE — 74011250637 HC RX REV CODE- 250/637: Performed by: HOSPITALIST

## 2020-06-04 RX ADMIN — ENOXAPARIN SODIUM 40 MG: 40 INJECTION SUBCUTANEOUS at 14:37

## 2020-06-04 RX ADMIN — METOPROLOL SUCCINATE 25 MG: 25 TABLET, FILM COATED, EXTENDED RELEASE ORAL at 09:12

## 2020-06-04 RX ADMIN — ACETAMINOPHEN 650 MG: 325 TABLET, FILM COATED ORAL at 21:42

## 2020-06-04 RX ADMIN — GUAIFENESIN 100 MG: 100 SOLUTION ORAL at 16:16

## 2020-06-04 RX ADMIN — METFORMIN HYDROCHLORIDE 500 MG: 500 TABLET ORAL at 16:16

## 2020-06-04 RX ADMIN — ASPIRIN 81 MG 81 MG: 81 TABLET ORAL at 09:12

## 2020-06-04 RX ADMIN — ACETAMINOPHEN 650 MG: 325 TABLET, FILM COATED ORAL at 14:37

## 2020-06-04 RX ADMIN — DIPHENHYDRAMINE HYDROCHLORIDE 25 MG: 25 CAPSULE ORAL at 16:16

## 2020-06-04 RX ADMIN — METFORMIN HYDROCHLORIDE 500 MG: 500 TABLET ORAL at 09:12

## 2020-06-04 RX ADMIN — Medication 400 MG: at 16:15

## 2020-06-04 RX ADMIN — ATORVASTATIN CALCIUM 80 MG: 80 TABLET, FILM COATED ORAL at 21:42

## 2020-06-04 RX ADMIN — LISINOPRIL 40 MG: 20 TABLET ORAL at 09:12

## 2020-06-04 RX ADMIN — POLYETHYLENE GLYCOL 3350 17 G: 17 POWDER, FOR SOLUTION ORAL at 14:44

## 2020-06-04 RX ADMIN — ACETAMINOPHEN 650 MG: 325 TABLET, FILM COATED ORAL at 05:40

## 2020-06-04 RX ADMIN — Medication 400 MG: at 09:12

## 2020-06-04 NOTE — PROGRESS NOTES
Problem: Self Care Deficits Care Plan (Adult)  Goal: *Acute Goals and Plan of Care (Insert Text)  Description  GOALS REVIEWED and UPDATED ON 5/12/2020 (BOLD INDICATES MOST RECENTLY UPDATED GOAL):  1. Patient will demonstrate appropriate safety awareness and protection of L UE during bed mobility and functional transfers with no verbal cues. CONTINUE   2. Patient will complete lower body bathing and dressing with Min A and adaptive equipment as needed. CONTINUE  3. Patient will complete upper body bathing and dressing with Supervision and adaptive equipment as needed. 4. Patient will complete weightbearing into the L UE with ADL tasks with minimal assistance to improve ability to use as a functional assist during ADL tasks. CONTINUE  5. Patient will demonstrate L UE SROM HEP within 7 days. CONTINUE  6. Pt will complete bed mobility with Mod I and no verbal cueing. CONTINUE  7. Pt will complete functional transfers with Supervision with equipment as needed. CONTINUE  8. Pt will don/doff UE sling with Mod I and no verbal cueing. CONTINUE  9. Pt will complete toileting with Supervision for toilet transfer and gown management. 10. Patient will participate in using L UE as a functional assist for ADL for 15 minutes with minimal facilitation. CONTINUE  11. Patient will complete sit to stand at midline with Supervision and cueing. 12. Patient will complete therapeutic exercises with L UE with minimal tactile cues for facilitation of the LUE. CONTINUE  13. Patient will don L LE AFO with SBA and minimal verbal cueing.     Outcome: Progressing Towards Goal     OCCUPATIONAL THERAPY: Daily Note and PM    6/4/2020  INPATIENT: OT Visit Days: 6  Payor: [id number] / Plan: [place] / Product Type: Medicaid /      NAME/AGE/GENDER: Gerri Orellana is a 53 y.o. male   PRIMARY DIAGNOSIS:  Acute ischemic stroke ([facility] [I63.9]  Cerebrovascular accident (CVA) due to embolism ([address] [I63.9] Acute embolic stroke ([facility] [address] Acute embolic stroke ([facility] [address] [facility]    KLS-69: Treatment Diagnosis:    · Generalized Muscle Weakness (M62.81)  · Other lack of cordination (R27.8)  · Hemiplegia and hemiparesis following cerebral infarction affecting   · left non-dominant side (I69.354)  · Abnormal posture (R29.3)   Precautions/Allergies:    NO PULLING ON LUE   LUE in sling with shoulder joint approximated and supported, needs taping   Patient has no known allergies. ASSESSMENT:     Mr. Anjel Gil presents to the hospital with L sided weakness and acute ischemic CVA. MRI revealed acute to subacute L cerebellar and R paramedian yariel infarcts. 6/4/2020: Pt agreeable to OT. He was sitting on bedside commode and able to stand/wipe himself with standby assistance. He was able to bridge this time for brief placement, but requires encouragement to bridge to scoot up the head of the bed. He was agreeable to L UE neuro reeducation in bed. Still doesn't know how to put his socks on - refuses because \"my legs too sore. \"  Will continue POC. This section established at most recent assessment   PROBLEM LIST (Impairments causing functional limitations):  1. Decreased Strength  2. Decreased ADL/Functional Activities  3. Decreased Transfer Abilities  4. Decreased Ambulation Ability/Technique  5. Decreased Balance  6. Decreased Activity Tolerance  7. Increased Fatigue  8. Decreased Flexibility/Joint Mobility  9. Decreased Knowledge of Precautions  10. Decreased [place] with Home Exercise Program   INTERVENTIONS PLANNED: (Benefits and precautions of occupational therapy have been discussed with the patient.)  1. Activities of daily living training  2. Adaptive equipment training  3. Balance training  4. Clothing management  5. Cognitive training  6. Donning&doffing training  7. Keanu tech training  8. Neuromuscular re-eduation  9. Therapeutic activity  10.  Therapeutic exercise     TREATMENT PLAN: Frequency/Duration: Follow patient 3 times per week to address above goals. Rehabilitation Potential For Stated Goals: Excellent     REHAB RECOMMENDATIONS (at time of discharge pending progress):    Placement: It is my opinion, based on this patient's performance to date, that Mr. Ellis Nair may benefit from intensive therapy at an [address] [place] [address] after discharge due to potential to make ongoing and sustainable functional improvement that is of practical value. [facility] Pt functioning far below independent baseline, demonstrating good improvement and participation. Pt would likely benefit greatly and increase independence from inpatient rehab stay. Equipment:    TBD               OCCUPATIONAL PROFILE AND HISTORY:   History of Present Injury/Illness (Reason for Referral):  See H&P  Past Medical History/Comorbidities:   Mr. Ellis Nair  has a past medical history of Acute ischemic stroke ([address] (12/12/2019), Acute pancreatitis (11/19/2014), Cerebrovascular accident (CVA) due to embolism ([address] (12/12/2019), Diabetes ([address] (2002), Diabetes ([address] Diabetes mellitus, and Hypertension. He also has no past medical history of Arthritis, Asthma, Autoimmune disease ([address] CAD (coronary artery disease), Cancer ([address] Chronic kidney disease, COPD, Dementia, Dementia ([address] Heart failure ([address] Ill-defined condition, Infectious disease, Liver disease, Other ill-defined conditions(799.89), Psychiatric disorder, PUD (peptic ulcer disease), Seizures ([address] or Sleep disorder. Mr. Ellis Nair  has a past surgical history that includes hx hernia repair and hx orthopaedic.   Social History/Living Environment:   Home Environment: Apartment  # Steps to Enter: 12  Rails to Enter: Yes  [employer] : Bilateral  One/Two Story Residence: One story  Living Alone: No  Support Systems: Spouse/Significant Other/Partner  Patient Expects to be Discharged to[de-identified] Unknown  Current DME Used/Available at Home: None  Tub or Shower Type: Tub/Shower combination  Prior Level of Function/Work/Activity:  Pt lives at home with his wife. Pt is typically independent with ADL/functional mobility. Pt does not drive. Pt was working part-time at [employer]. Personal Factors:          Age:  53 y.o. Past/Current Experience:  CVA with flaccid L side        Other factors that influence how disability is experienced by the patient:  multiple co-morbidities    Number of Personal Factors/Comorbidities that affect the Plan of Care: Extensive review of physical, cognitive, and psychosocial performance (3+):  HIGH COMPLEXITY   ASSESSMENT OF OCCUPATIONAL PERFORMANCE[de-identified]   Activities of Daily Living:   Basic ADLs (From Assessment) Complex ADLs (From Assessment)   Feeding: Setup  Oral Facial Hygiene/Grooming: Minimum assistance  Bathing: Minimum assistance, Moderate assistance  Upper Body Dressing: Minimum assistance  Lower Body Dressing: Moderate assistance  Toileting: Minimum assistance Instrumental ADL  Meal Preparation: Total assistance  Homemaking: Total assistance  Medication Management: Total assistance  Financial Management: Total assistance   Grooming/Bathing/Dressing Activities of Daily Living         Upper Body Bathing  Bathing Assistance: Min A   Position Performed: Seated in chair        Toileting  Bowel Hygiene: Stand-by assistance  Clothing Management: Minimum assistance(in supine - bridged)         Lower Body Dressing Assistance  Socks:  Total assistance (dependent) Bed/Mat Mobility  Rolling: Stand-by assistance  Sit to Supine: Modified independent  Sit to Stand: Stand-by assistance  Stand to Sit: Stand-by assistance  Bed to Chair: Stand-by assistance  Scooting: Supervision  Adaptive Equipment: Bed rails     Most Recent Physical Functioning:   Gross Assessment:                  Posture:     Balance:  Sitting: Impaired  Sitting - Static: Good (unsupported)  Sitting - Dynamic: Good (unsupported)  Standing: Impaired  Standing - Static: Good  Standing - Dynamic : Fair Bed Mobility:  Rolling: Stand-by assistance  Sit to Supine: Modified independent  Scooting: Supervision  Wheelchair Mobility:     Transfers:  Sit to Stand: Stand-by assistance  Stand to Sit: Stand-by assistance  Bed to Chair: Stand-by assistance            Patient Vitals for the past 6 hrs:   BP BP Patient Position SpO2 Pulse   06/04/20 1200 135/84 At rest 95 % 77   06/04/20 1606 132/80 At rest 96 % 72       Mental Status  Neurologic State: Alert  Orientation Level: Oriented to person, Oriented to place  Cognition: Follows commands  Perception: Verbal, Tactile  Perseveration: Verbal cues provided, Tactile cues provided  Safety/Judgement: Fall prevention                          Physical Skills Involved:  1. Range of Motion  2. Balance  3. Strength  4. Activity Tolerance  5. Fine Motor Control  6. Gross Motor Control Cognitive Skills Affected (resulting in the inability to perform in a timely and safe manner):  1. Perception  2. Expression Psychosocial Skills Affected:  1. Habits/Routines  2. Environmental Adaptation  3. Social Interaction  4. Emotional Regulation  5. Self-Awareness  6. Awareness of Others  7. Social Roles   Number of elements that affect the Plan of Care: 5+:  HIGH COMPLEXITY   CLINICAL DECISION MAKING:   [facility] AM-PAC 6 Clicks   Daily Activity Inpatient Short Form  How much help from another person does the patient currently need. .. Total A Lot A Little None   1. Putting on and taking off regular lower body clothing? [] 1   [] 2   [x] 3   [] 4   2. Bathing (including washing, rinsing, drying)? [] 1   [] 2   [x] 3   [] 4   3. Toileting, which includes using toilet, bedpan or urinal?   [] 1   [] 2   [x] 3   [] 4   4. Putting on and taking off regular upper body clothing? [] 1   [] 2   [x] 3   [] 4   5. Taking care of personal grooming such as brushing teeth? [] 1   [] 2   [x] 3   [] 4   6. Eating meals?    [] 1   [] 2   [x] 3   [] 4   © 2007, Trustees of [facility], under license to [employer]. All rights reserved      Score:  Initial: 11 Most Recent: 18 (5/12/20)    Interpretation of Tool:  Represents activities that are increasingly more difficult (i.e. Bed mobility, Transfers, Gait). Medical Necessity:     · Patient demonstrates   · good and excellent  ·  rehab potential due to higher previous functional level. Reason for Services/Other Comments:  · Patient continues to require skilled intervention due to   · Decreased independence with ADL/functional transfers that impacts overall quality of life. · .   Use of outcome tool(s) and clinical judgement create a POC that gives a: MODERATE COMPLEXITY         TREATMENT:   (In addition to Assessment/Re-Assessment sessions the following treatments were rendered)     Pre-treatment Symptoms/Complaints: Pt reports no pain in L hand which he has been experiencing in prior sessions. Pain: Initial:   Pain Intensity 1: 0 Post Session: Unchanged     Self Care: (10 minutes): Procedure(s) utilized to improve and/or restore self-care/home management as related to dressing and toileting. Required minimal visual, verbal and manual cueing to facilitate activities of daily living skills. Neuromuscular Re-education: (5 minutes):  Exercise/activities per grid below to improve balance, coordination, kinesthetic sense, posture and proprioception. Required minimal visual, verbal, manual and tactile cues to promote static and dynamic balance in sitting, promote coordination of left, upper extremity(s) and promote motor control of left, upper extremity(s). LUE Re-Education  Muscle Facilitation: 1. Shoulder ER arm adducted 5-10 reps; 2. Shoulder flexion in supine 1 set 5-10 reps.         Date:   4/22/2020 Date:  4/24/2020 Date:  4/27/2020 Date:   5/7/20   Activity/Exercise Parameters Parameters Parameters    Shoulder flexion SROM/AAROM       Elbow flexion/extension SROM/AAROM       Forearm supination/pronation        Wrist extension     20 reps AROM (partial ROM); Attempted isometric extension but patient not able to hold position in extension    Finger flexion/extension     AAROM for tendon gliding (reports pain with flexion); 15-20 reps    Forward Reaching with soccer ball 20 reps (using two handed technique with R hand over left; therapist supporting L elbow) 20 reps (using two handed technique with R hand over left; therapist supporting L elbow) 25 reps (using two handed technique with R hand over left; therapist supporting L elbow) 20 reps using two handed technique and pushing forward for protraction/forward reaching (using washcloth)   Crossing Midline with soccer ball    20 reps each way (using two handed technique with R hand over left; therapist supporting L elbow) Pt completed with for 20 reps with support at the elbow and maximal facilitation from therapist to moving UE (using washcloth)   Shoulder Horizontal Abduction & Adduction with soccer ball 20 reps each way (using two handed technique with R hand over left; therapist supporting L elbow) 20 reps each way (using two handed technique with R hand over left; therapist supporting L elbow)     Shoulder Flexion & Crossing Midline with cones 14 reps (pt stabilized L wrist; therapist supporting L elbow) 14 reps (pt stabilized L wrist; therapist supporting L elbow) 14 reps (pt stabilized L wrist; therapist supporting L elbow) 5 reps with maximal assistance to support at the elbow and wrist   Digit Flexion & Extension with pegs 24 reps (using two handed technique with assist needed for object release) 24 reps (using two handed technique with decreased assist needed for object release) 24 reps (using two handed technique with decreased assist needed for object release)    1st CMC Flexion/Extension  10 reps (using yellow putty with Total A from therapist to complete) 15 reps (using yellow putty with Total A from therapist to complete)                  LUE Re-Education  Muscle Facilitation: 1.  Shoulder ER arm adducted 5-10 reps; 2. Shoulder flexion in supine 1 set 5-10 reps.       Date:  4/14/20  Date:  4/15/20 Date:   4/16/20 Date:  5/7/20   Activity/Exercise Parameters Parameters     Midline orientation sit to stand  Moderate facilitation at the L LE and for decreased rotation at the trunk      Midline sit to squat  Min to mod A w/ hands in his lap       Weightbearing into L side standing at sink        Forward reach with cane 10 reps with moderate facilitation at the elbow/scapula 15 reps with moderate facilitation at the elbow/scapula into protraction  15 reps with moderate facilitation at the elbow/scapula into protraction 25 reps with moderate facilitation at the elbow/scapula  (cane on the floor and pt pushing forward)   External rotation with cane 10 reps with minimal facilitation  15 reps with SBA/CGA  15 reps with SBA/CGA 25 reps with minimal facilitation (cane on the floor and elbow at his side)   Posture  Upright sitting/standing with verbal/visual cueing  Pt encouraged for upright posture with moderate cues throughout session  Pt encouraged for upright posture with moderate cues throughout session     Weightbearing into elbow  10 reps with moderate assistance pushing up into midline  2 sets with prolonged weight bearing and reaching to the L of midline for 2 sets x 15 reps  Sitting at the table with pt encouraged for propped elbows and weightbearing through the L with moderate cues     Weightbearing into hand Mod to maximal assistance with facilitation at the elbow; 2 sets x 10 reps       Elbow flexion 10 reps AAROM; 10 reps holding ball in B hands       Grasp release of washcloth  10 reps with moderate to maximal facilitation for reaching   4-5 reps with additional time     Trunk Rotation   15 reps with minima facilitation  15 reps with minimal facilitation and additional time            Side Scooting   Minimal facilitation and assistance for positioning and weightbearing of L Hand through his L knee and forward forward flexion at the trunk        Braces/Orthotics/Lines/Etc:   · O2 device: Room air  Treatment/Session Assessment:    Response to Treatment:  Pt is making slight improvement in acute care goals. · Interdisciplinary Collaboration:   o Occupational Therapist  o Registered Nurse  · After treatment position/precautions:   o Supine in bed  o Bed/Chair-wheels locked  o Bed in low position  o Call light within reach  o RN notified   · Compliance with Program/Exercises: Compliant all of the time, Will assess as treatment progresses. · Recommendations/Intent for next treatment session: \"Next visit will focus on advancements to more challenging activities and reduction in assistance provided\".   Total Treatment Duration:   OT Patient Time In/Time Out  Time In: 1559  Time Out: [address] SOTERO Rodriguez, ADENR/L

## 2020-06-04 NOTE — PROGRESS NOTES
Pt has been accepted for admission to [facility] . Per their admissions director, they will do their best to provide therapy pro harsha for a period time since medicaid does not cover rehab in a SNF. LOC request submitted to [facility]/Medicaid today. Confirmation # [id number]. JUNI spoke wizeb pt's spouse, via phone, to notify her of this information and to get an updated address. Her new address is [address] [place] [address]. [facility] updated. Awaiting LOC determination. New PPD ordered. Pt will need a COVID test which will be ordered 2 days prior to anticipated dc. SW following.

## 2020-06-04 NOTE — PROGRESS NOTES
Hospitalist Progress Note    Subjective:   Daily Progress Note: 6/3/2020 1006    Patient with acute stroke admitted 12/12/19 causing extensive left side weakness. Right side dominant. Discharge delayed months due to medicaid and SSI approval.  Additional delay issue includes wife refusal to have patient return home as they were in the process of  when stroke occurred. Echo with + PFO, on statin and ASA.  Will need event monitor on discharge and if no arrhythmia, PFO closure recommended. Speech remains slightly slurred. Fitted for AFO.  Metformin dosing adjusted with good glucose control.      5/11:  Still waiting for disability rating, Medicaid. . Ambulated 400 feet, core rmains weak per PT.     5/27:  Approved for disability     6/3:  Sitting up in bed without complaints. In better spirits today. Making slow progress with PT/OT.      Current Facility-Administered Medications   Medication Dose Route Frequency    magnesium oxide (MAG-OX) tablet 400 mg  400 mg Oral BID    metFORMIN (GLUCOPHAGE) tablet 500 mg  500 mg Oral BID WITH MEALS    acetaminophen (TYLENOL) tablet 650 mg  650 mg Oral Q4H PRN    diphenhydrAMINE (BENADRYL) capsule 25 mg  25 mg Oral Q6H PRN    acetaminophen (TYLENOL) tablet 650 mg  650 mg Oral Q8H    lip protectant (BLISTEX) ointment 1 Each  1 Each Topical PRN    metoprolol succinate (TOPROL-XL) XL tablet 25 mg  25 mg Oral DAILY    polyethylene glycol (MIRALAX) packet 17 g  17 g Oral DAILY PRN    guaiFENesin (ROBITUSSIN) 100 mg/5 mL oral liquid 100 mg  100 mg Oral Q4H PRN    hydrALAZINE (APRESOLINE) 20 mg/mL injection 20 mg  20 mg IntraVENous Q4H PRN    atorvastatin (LIPITOR) tablet 80 mg  80 mg Oral QHS    lisinopril (PRINIVIL, ZESTRIL) tablet 40 mg  40 mg Oral DAILY    sodium chloride (NS) flush 5-40 mL  5-40 mL IntraVENous PRN    ondansetron (ZOFRAN) injection 4 mg  4 mg IntraVENous Q4H PRN    aspirin chewable tablet 81 mg  81 mg Oral DAILY    enoxaparin (LOVENOX) injection 40 mg  40 mg SubCUTAneous Q24H    dextrose 40% (GLUTOSE) oral gel 1 Tube  15 g Oral PRN    glucagon (GLUCAGEN) injection 1 mg  1 mg IntraMUSCular PRN    dextrose (D50W) injection syrg 12.5-25 g  25-50 mL IntraVENous PRN      Review of Systems  A comprehensive review of systems was negative except for that written in the HPI. Objective:     Visit Vitals  /86 (BP 1 Location: Right arm, BP Patient Position: Sitting)   Pulse 84   Temp 98 °F (36.7 °C)   Resp 18   Ht 5' 6\" (1.676 m)   Wt 79.8 kg (175 lb 14.4 oz)   SpO2 96%   BMI 28.39 kg/m²      O2 Device: Room air    Temp (24hrs), Av °F (36.7 °C), Min:97.6 °F (36.4 °C), Max:98.2 °F (36.8 °C)     07 -  1900  In: 210 [P.O.:210]  Out: -     General appearance: Conversant, oriented. Denies need or new issues.    Head: Normocephalic, without obvious abnormality, atraumatic  Eyes: conjunctivae/corneas clear. PERRL  Throat: Continued mild left facial weakness.  Lips, mucosa, and tongue normal. Teeth and gums normal.  Eating normally. Neck: supple, symmetrical, trachea midline, no JVD  Lungs: clear to auscultation bilaterally  Heart: regular rate and rhythm, S1, S2 normal, no murmur, click, rub or gallop  Abdomen: soft, non-tender. Bowel sounds normal. No masses,  no organomegaly  Extremities: Persistent left side upper and lower residual weakness, improved vastly since admission.  Using brace.    Skin: Skin color, texture, turgor normal. No rashes or lesions  Neurologic: As above. Last Labs:     Ref.  Range 2020 05:56   WBC Latest Ref Range: 4.3 - 11.1 K/uL 5.2   RBC Latest Ref Range: 4.23 - 5.6 M/uL 3.94 (L)   HGB Latest Ref Range: 13.6 - 17.2 g/dL 10.8 (L)   HCT Latest Ref Range: 41.1 - 50.3 % 33.3 (L)   MCV Latest Ref Range: 79.6 - 97.8 FL 84.5   MCH Latest Ref Range: 26.1 - 32.9 PG 27.4   MCHC Latest Ref Range: 31.4 - 35.0 g/dL 32.4   RDW Latest Ref Range: 11.9 - 14.6 % 15.9 (H)   PLATELET Latest Ref Range: 150 - 450 K/uL 175 MPV Latest Ref Range: 9.4 - 12.3 FL 11.4   NEUTROPHILS Latest Ref Range: 43 - 78 % 44   LYMPHOCYTES Latest Ref Range: 13 - 44 % 47 (H)   MONOCYTES Latest Ref Range: 4.0 - 12.0 % 6   EOSINOPHILS Latest Ref Range: 0.5 - 7.8 % 2   BASOPHILS Latest Ref Range: 0.0 - 2.0 % 1   IMMATURE GRANULOCYTES Latest Ref Range: 0.0 - 5.0 % 0   DF Latest Units:   AUTOMATED   ABSOLUTE NRBC Latest Ref Range: 0.0 - 0.2 K/uL 0.00   ABS. NEUTROPHILS Latest Ref Range: 1.7 - 8.2 K/UL 2.3   ABS. IMM. GRANS. Latest Ref Range: 0.0 - 0.5 K/UL 0.0   ABS. LYMPHOCYTES Latest Ref Range: 0.5 - 4.6 K/UL 2.5   ABS. MONOCYTES Latest Ref Range: 0.1 - 1.3 K/UL 0.3   ABS. EOSINOPHILS Latest Ref Range: 0.0 - 0.8 K/UL 0.1   ABS.  BASOPHILS Latest Ref Range: 0.0 - 0.2 K/UL 0.0   Sodium Latest Ref Range: 136 - 145 mmol/L 142   Potassium Latest Ref Range: 3.5 - 5.1 mmol/L 4.5   Chloride Latest Ref Range: 98 - 107 mmol/L 111 (H)   CO2 Latest Ref Range: 21 - 32 mmol/L 26   Anion gap Latest Ref Range: 7 - 16 mmol/L 5 (L)   Glucose Latest Ref Range: 65 - 100 mg/dL 102 (H)   BUN Latest Ref Range: 6 - 23 MG/DL 24 (H)   Creatinine Latest Ref Range: 0.8 - 1.5 MG/DL 1.17   Calcium Latest Ref Range: 8.3 - 10.4 MG/DL 9.1   GFR est non-AA Latest Ref Range: >60 ml/min/1.73m2 >60   GFR est AA Latest Ref Range: >60 ml/min/1.73m        Assessment/Plan:   Acute to early subacute infarcts in the left cerebellar hemisphere, in the periventricular deep left frontoparietal white matter and likely additional areas of restricted diffusion in the right paramedian yariel.  Old lacunar infarcts also.                  Making good progress with PT/OT                Speech and swallowing improved to     the extent SLP signed off                 Pending placement after approved for     [facility]                             Medicaid approval 5/27     Dysarthria due to stroke:  Improved immensely, able to understand all speech.               SLP releasing 4/14      Difficult placement with marital discord prior to stroke:  Wife does not want him at home:  TANYA working on tirelessly                 Hypertension:  Continue lisinopril, toprol XL     NIDDM II:  Continue glucophage, diabetic diet.  A1C:  8.4              DM education  and  management continues to          follow intermittently,  increased  glucophage frequency 4/30              Discontinued SSI      Arrhythmia:  On beta blocker     PFO 12/17/2019:                Will need 4 week event monitor  on  discharge               Will need PFO closure at some  point after discharge per Cards      Hypomagnesemia:  Replace and recheck               Increasing daily 400 mg dose to bid 5/2.             monitor weekly               AGM on to labs 5/30: Ni Olivier discussed with: Patient and Nurse    Signed By: Devan Sun NP     Komal 3, 2020

## 2020-06-04 NOTE — PROGRESS NOTES
Progress Note    2020  Admit Date: 2019  9:07 AM   NAME: Carlos Strauss   :  1975   MRN:  [id number]   Attending: Edgardo Tejeda MD  PCP:  Delgado Ochoa MD  Treatment Team: Attending Provider: Edgardo Tejeda MD; Care Manager: Nicole Telles LMSW; Utilization Review: Celestina Garcia RN; Nurse Practitioner: Madelin Serrano NP; Hospitalist: Derek Landis NP; Utilization Review: Crys Fitzpatrick, JERRY; Nurse Practitioner: Chemo Stewart NP; Charge Nurse: Omaira Bell    Full Code   SUBJECTIVE:   Mr. Ana Olson is a 52 yo male with PMH of DM, HTN who presented with c/o left sided weakness and left facial droop 19.   CT head showed age indeterminate infarctions within the left corona radiata/caudate.  CTA head/neck showed stenosis at the distal segment of the right vertebral artery and multifocal stenosis in the P2 segment of the left posterior cerebral artery. MRI brain showed acute to early subacute infarcts in the left cerebellar hemisphere, in the periventricular deep left frontoparietal white matter and likely additional areas of restricted diffusion in the right paramedian yariel.   Echo +PFO.  On statin, ASA.  Has been difficult to place in STR. Plans for 4 week event monitor on DC and if no arrhythmia PFO closure recommended. Pt remains stable today. Awaiting placement, medicaid pending. Denies complaints. Progressing well with PT. Past Medical History:   Diagnosis Date    Acute ischemic stroke ([facility] [address] 2019    Acute pancreatitis 2014    Cerebrovascular accident (CVA) due to embolism ([facility] [address] 2019    Diabetes ([facility] [address] 2002    Diabetes ([facility] [address]     Diabetes mellitus     Hypertension      No results found for this or any previous visit (from the past 25 hour(s)).   No Known Allergies  Current Facility-Administered Medications   Medication Dose Route Frequency Provider Last Rate Last Dose    magnesium oxide (MAG-OX) tablet 400 mg  400 mg Oral BID Keenan Holden, NP   400 mg at 06/04/20 1615    metFORMIN (GLUCOPHAGE) tablet 500 mg  500 mg Oral BID WITH MEALS Doris Humphrey NP   500 mg at 06/04/20 1616    acetaminophen (TYLENOL) tablet 650 mg  650 mg Oral Q4H PRN Kailash Bishop MD   650 mg at 05/04/20 0837    diphenhydrAMINE (BENADRYL) capsule 25 mg  25 mg Oral Q6H PRN Fabiola Masters MD   25 mg at 06/04/20 1616    acetaminophen (TYLENOL) tablet 650 mg  650 mg Oral Anabel Blood MD   650 mg at 06/04/20 1437    lip protectant (BLISTEX) ointment 1 Each  1 Each Topical PRN Loida Paulson MD        metoprolol succinate (TOPROL-XL) XL tablet 25 mg  25 mg Oral DAILY Kailash Bishop MD   25 mg at 06/04/20 0912    polyethylene glycol (MIRALAX) packet 17 g  17 g Oral DAILY PRN Valarie TARIQ DO   17 g at 06/04/20 1444    guaiFENesin (ROBITUSSIN) 100 mg/5 mL oral liquid 100 mg  100 mg Oral Q4H PRN Kailash Bishop MD   100 mg at 06/04/20 1616    hydrALAZINE (APRESOLINE) 20 mg/mL injection 20 mg  20 mg IntraVENous Q4H PRN Laina Adams MD   20 mg at 04/17/20 0510    atorvastatin (LIPITOR) tablet 80 mg  80 mg Oral QHS Laina Adams MD   80 mg at 06/03/20 2243    lisinopril (PRINIVIL, ZESTRIL) tablet 40 mg  40 mg Oral DAILY Laina Adams MD   40 mg at 06/04/20 0912    sodium chloride (NS) flush 5-40 mL  5-40 mL IntraVENous PRN Laina Adams MD   10 mL at 05/27/20 1351    ondansetron (ZOFRAN) injection 4 mg  4 mg IntraVENous Q4H PRN Laina Adams MD        aspirin chewable tablet 81 mg  81 mg Oral DAILY Laina Adams MD   81 mg at 06/04/20 0912    enoxaparin (LOVENOX) injection 40 mg  40 mg SubCUTAneous Q24H Laina Adams MD   40 mg at 06/04/20 1437    dextrose 40% (GLUTOSE) oral gel 1 Tube  15 g Oral PRZEKE Adams MD        glucagon [facility]) injection 1 mg  1 mg IntraMUSCular PRN Laina Adams MD        dextrose (D50W) injection syrg 12.5-25 g  25-50 mL IntraVENous PRN Riddhi Smith MD           Review of Systems negative with exception of pertinent positives noted above  PHYSICAL EXAM     Visit Vitals  /80 (BP 1 Location: Right arm, BP Patient Position: At rest)   Pulse 72   Temp 98 °F (36.7 °C)   Resp 17   Ht 5' 6\" (1.676 m)   Wt 79.8 kg (175 lb 14.4 oz)   SpO2 96%   BMI 28.39 kg/m²      Temp (24hrs), Av °F (36.7 °C), Min:98 °F (36.7 °C), Max:98.1 °F (36.7 °C)    Oxygen Therapy  O2 Sat (%): 96 % (20 1606)  Pulse via Oximetry: 75 beats per minute (20 0400)  O2 Device: Room air (20 1648)  No intake or output data in the 24 hours ending 20 1708      General:       No acute distress    Lungs:          CTA bilaterally. Resp even and nonlabored  Heart:            S1S2 present without murmurs rubs gallops. RRR. No LE edema  Abdomen:    Soft, non tender, non distended. BS present  Extremities: No cyanosis. Moves LUE at elbow  Neurologic:  moves right hand and raises arm at elbow moderately, moves left arm at elbow mildly slurred speech.  PERRLA, strength 4/5 RUE/RLE,  2/5 LUE, LLE 1/5      Results summary of Diagnostic Studies/Procedures copied from within [facility] EMR:        [address] Problems    Diagnosis Date Noted    Hypomagnesemia 2020    PFO (patent foramen ovale) 2019    Arrhythmia 12/15/2019    Hyperlipemia     [facility] [address]     Type 2 diabetes mellitus ([facility] [address]     Hypertension      Plan:  Acute embolic stroke  MRI brain showed acute to early subacute infarcts in the left cerebellar hemisphere, in the periventricular deep left frontoparietal white matter and likely additional areas of restricted diffusion in the right paramedian yariel.    +PFO on echo  On ASA, statin   NP spoke to Cards, plans for PFO closure after DC to facility     Hypomagnesemia   on oral supplementation        Hypertension  Continue metoprolol and ACE inhibitor  Goal BP <130/80     Type 2 diabetes mellitus:    Monitor, BGL controlled   A1C 6.9      Medically stable for DC.  Awaiting Medicaid approval.         Notes, labs, VS, diagnostic testing reviewed  Time spent with pt 20 min           DVT Prophylaxis: lovenox       Plan of Care Discussed with: Supervising MD Dr. Ezekiel Mcconnell, care team, pt      Myke Ambrosio, MARCELO

## 2020-06-05 PROCEDURE — 74011250637 HC RX REV CODE- 250/637: Performed by: INTERNAL MEDICINE

## 2020-06-05 PROCEDURE — 74011250637 HC RX REV CODE- 250/637: Performed by: NURSE PRACTITIONER

## 2020-06-05 PROCEDURE — 65270000029 HC RM PRIVATE

## 2020-06-05 PROCEDURE — 74011250637 HC RX REV CODE- 250/637: Performed by: HOSPITALIST

## 2020-06-05 PROCEDURE — 97530 THERAPEUTIC ACTIVITIES: CPT

## 2020-06-05 PROCEDURE — 74011250636 HC RX REV CODE- 250/636: Performed by: INTERNAL MEDICINE

## 2020-06-05 RX ADMIN — ASPIRIN 81 MG 81 MG: 81 TABLET ORAL at 09:10

## 2020-06-05 RX ADMIN — Medication 400 MG: at 17:10

## 2020-06-05 RX ADMIN — Medication 400 MG: at 09:10

## 2020-06-05 RX ADMIN — ACETAMINOPHEN 650 MG: 325 TABLET, FILM COATED ORAL at 22:18

## 2020-06-05 RX ADMIN — ACETAMINOPHEN 650 MG: 325 TABLET, FILM COATED ORAL at 14:27

## 2020-06-05 RX ADMIN — METFORMIN HYDROCHLORIDE 500 MG: 500 TABLET ORAL at 09:10

## 2020-06-05 RX ADMIN — METFORMIN HYDROCHLORIDE 500 MG: 500 TABLET ORAL at 17:10

## 2020-06-05 RX ADMIN — DIPHENHYDRAMINE HYDROCHLORIDE 25 MG: 25 CAPSULE ORAL at 17:09

## 2020-06-05 RX ADMIN — GUAIFENESIN 100 MG: 100 SOLUTION ORAL at 17:10

## 2020-06-05 RX ADMIN — ACETAMINOPHEN 650 MG: 325 TABLET, FILM COATED ORAL at 04:57

## 2020-06-05 RX ADMIN — METOPROLOL SUCCINATE 25 MG: 25 TABLET, FILM COATED, EXTENDED RELEASE ORAL at 09:10

## 2020-06-05 RX ADMIN — ENOXAPARIN SODIUM 40 MG: 40 INJECTION SUBCUTANEOUS at 14:27

## 2020-06-05 RX ADMIN — ATORVASTATIN CALCIUM 80 MG: 80 TABLET, FILM COATED ORAL at 22:18

## 2020-06-05 RX ADMIN — LISINOPRIL 40 MG: 20 TABLET ORAL at 09:09

## 2020-06-05 NOTE — PROGRESS NOTES
Problem: Mobility Impaired (Adult and Pediatric)  Goal: *Acute Goals and Plan of Care  Description  REMAIN ONGOING ON RE-ASSESSMENT 6/1/2020  1. Patient will perform bed mobility with MODIFIED INDEPENDENCE and 0 verbal cues within 7 treatment days. PROGRESSING; CURRENTLY AT SUPERVISION  2. Patient will perform transfer bed to chair/wheelchair with SUPERVISION within 7 treatment days. PROGRESSING; CURRENTLY AT CGA  3. Patient will demonstrate FAIR+ dynamic balance throughout ambulation within 7 treatment days. PROGRESSING; CURRENTLY AT FAIR  4. Patient demonstrate 3+/5 strength in L LE hip flexion, hip abduction, hip adduction, and knee extension (quad strength) within 7 treatment days. PROGRESSING SEE STRENGTH ASSESSMENT BELOW  5. Patient will ambulate 300ft+ with STAND BY ASSIST and least retrictive assistive device and L LE AFO within 7 treatment days. PROGRESSING CURRENTLY AT CGA FOR 300FT  6. Patient will perform wheelchair mobility 75ft with SUPERVISION and 0 verbal cues within 7 treatment days. PROGRESSING CURRENTLY REQUIRING CGA FOR 50 FT  7. Patient will don/doff LUE sling for protection of L shoulder during transfers/gait with set up within 7 treatment days. PROGRESSING REQUIRING MIN-MODERATE ASSISTANCE  8. Patient will instruct caregiver how to don/doff L LE AFO with independence within 7 treatment days. 9. Patient will tolerate out of bed mobility 4 hours daily to address dynamic sitting balance, promote anterior pelvic tilt, and trunk/core strengthening within 7 treatment days.         PHYSICAL THERAPY: Daily Note and PM 6/5/2020  INPATIENT: PT Visit Days : 3  Payor: [id number] / Plan: [place] MEDICAID [facility]ly [facility] / Product Type: Medicaid /       NAME/AGE/GENDER: Britney Burgess is a 53 y.o. male   PRIMARY DIAGNOSIS: Acute ischemic stroke ([address] [I63.9]  Cerebrovascular accident (CVA) due to embolism ([address] [D07.5] Acute embolic stroke ([address] Acute embolic stroke ([address]       ICD-10: Treatment Diagnosis:   · Difficulty in walking, Not elsewhere classified (R26.2)  · Hemiplegia and hemiparesis following cerebral infarction affecting left non-dominant side (E70.923)   Precaution/Allergies:  Patient has no known allergies. ASSESSMENT:     Mr. Henriquez is a 53year old admitted R MCA CVA with left hemiparesis. Patient was supine upon contact and agreeable to PT. Patient seems to be having more difficulty today requiring increased assistance with bed mobility and transfers. Patient needed min assist to perform supine to sit after allowing patient to attempt on his own power with cues to improve technique. Therapist donned socks, brace, and shoes with total assist. Patient was then rolled outside to participate in ambulation on uneven surfaces. Patient transfers to standing with min assist and cues for technique. Once standing patient ambulates 200' on uneven surfaces (up and down slight inclines, over uneven cracks) needing CGA-min assist for balance and cues for walker placement due to narrow sidewalk. Patient would place luke walker half on sidewalk and half on grass causing it to be unstable and increase his unsteadiness. Patient was rolled back inside where he returns to supine with SBA. Overall slow progress towards physical therapy goals. Patient's goals listed above are still appropriate. Will continue skilled PT to address remaining deficits. This section established at most recent assessment   PROBLEM LIST (Impairments causing functional limitations):  1. Decreased Strength  2. Decreased ADL/Functional Activities  3. Decreased Transfer Abilities  4. Decreased Ambulation Ability/Technique  5. Decreased Balance  6. Increased Pain  7. Decreased Activity Tolerance  8. Increased Fatigue  9. Decreased Flexibility/Joint Mobility   INTERVENTIONS PLANNED: (Benefits and precautions of physical therapy have been discussed with the patient.)  1. Balance Exercise  2. Bed Mobility  3.  Family Education  4. Gait Training  5. Home Exercise Program (HEP)  6. Manual Therapy  7. Neuromuscular Re-education/Strengthening  8. Range of Motion (ROM)  9. Therapeutic Activites  10. Therapeutic Exercise/Strengthening  11. Transfer Training     TREATMENT PLAN: Frequency/Duration: 3 times a week for duration of hospital stay  Rehabilitation Potential For Stated Goals: Good     REHAB RECOMMENDATIONS (at time of discharge pending progress):    Placement: It is my opinion, based on this patient's performance to date, that Mr. Naun Hampton may benefit from intensive therapy at an [address] [place] [address] after discharge due to a probable need for close medical supervision by a rehab physician, a probable need for multiple therapy disciplines and potential to make ongoing and sustainable functional improvement that is of practical value. .  Equipment:    TBD pending progress with therapy. HISTORY:   History of Present Injury/Illness (Reason for Referral):  Per H&P: \"Pt is a 52 y/o smoker with DM, HTN, who presented to ER with L leg and arm weakness, L facial droop, dysarthria. First noted L leg weakness late night 12/11 when he woke up to go to the bathroom. He was normal when he went to bed around 1030. Woke up this morning and had persistent weakness L leg and also now noted in L arm. EMS called. Noted to have slurred speech and L facial droop as well. Code S called in ER around 9am.  MRI with acute infarcts in L cerebellar hemisphere, deep frontoparietal white matter, and R paramedian yariel. Also noted old lacunar infarcts. No large vessel occlusion on CTA, but some stenosis noted. No hx afib, TIA, CVA. No CP, palpitations, SOB. \"  Past Medical History/Comorbidities:   Mr. Naun Hampton  has a past medical history of Acute ischemic stroke ([address] (12/12/2019), Acute pancreatitis (11/19/2014), Cerebrovascular accident (CVA) due to embolism ([address] (12/12/2019), Diabetes ([address] (2002), Diabetes ([address] Diabetes mellitus, and Hypertension. He also has no past medical history of Arthritis, Asthma, Autoimmune disease ([facility] CAD (coronary artery disease), Cancer ([facility] Chronic kidney disease, COPD, Dementia, Dementia ([facility] Heart failure ([facility] Ill-defined condition, Infectious disease, Liver disease, Other ill-defined conditions(799.89), Psychiatric disorder, PUD (peptic ulcer disease), Seizures ([facility] or Sleep disorder. Mr. Aristeo Rascon  has a past surgical history that includes hx hernia repair and hx orthopaedic. Social History/Living Environment:   Home Environment: Apartment  # Steps to Enter: 12  Rails to Enter: Yes  [employer] : Bilateral  One/Two Story Residence: One story  Living Alone: No  Support Systems: Spouse/Significant Other/Partner  Patient Expects to be Discharged to[de-identified] Unknown  Current DME Used/Available at Home: None  Tub or Shower Type: Tub/Shower combination  Prior Level of Function/Work/Activity:  Independent, lives with wife in 2nd story 1 level apartment. No recent falls.    Number of Personal Factors/Comorbidities that affect the Plan of Care: 1-2: MODERATE COMPLEXITY   EXAMINATION:   Most Recent Physical Functioning:   Gross Assessment: left hip grossly 2/5 to 3-/5                       Balance:  Sitting: Impaired  Sitting - Static: Good (unsupported)  Sitting - Dynamic: Fair (occasional)  Standing: Impaired  Standing - Static: Good  Standing - Dynamic : Fair Bed Mobility:  Supine to Sit: Minimum assistance  Wheelchair Mobility: NT     Transfers:  Sit to Stand: Minimum assistance  Stand to Sit: Minimum assistance  Gait:     Base of Support: Center of gravity altered;Shift to right  Speed/Clotilde: Slow  Step Length: Left shortened;Right shortened  Gait Abnormalities: Hemiplegic;Trunk sway increased  Distance (ft): 200 Feet (ft)  Assistive Device: Walker luke  [place] Terrain - Level of Assistance: Contact guard assistance;Minimum assistance  Ambulation - Level of Assistance: Contact guard assistance  Interventions: Safety awareness training; Tactile cues; Verbal cues      Body Structures Involved:  1. Nerves  2. Voice/Speech  3. Bones  4. Joints  5. Muscles Body Functions Affected:  1. Mental  2. Sensory/Pain  3. Neuromusculoskeletal  4. Movement Related Activities and Participation Affected:  1. General Tasks and Demands  2. Mobility  3. Self Care  4. Interpersonal Interactions and Relationships   Number of elements that affect the Plan of Care: 4+: HIGH COMPLEXITY   CLINICAL PRESENTATION:   Presentation: Evolving clinical presentation with changing clinical characteristics: MODERATE COMPLEXITY   CLINICAL DECISION MAKIN [facility] Inpatient Short Form  How much difficulty does the patient currently have. .. Unable A Lot A Little None   1. Turning over in bed (including adjusting bedclothes, sheets and blankets)? [] 1   [] 2   [] 3   [x] 4   2. Sitting down on and standing up from a chair with arms ( e.g., wheelchair, bedside commode, etc.)   [] 1   [] 2   [x] 3   [] 4   3. Moving from lying on back to sitting on the side of the bed? [] 1   [] 2   [] 3   [x] 4   How much help from another person does the patient currently need. .. Total A Lot A Little None   4. Moving to and from a bed to a chair (including a wheelchair)? [] 1   [] 2   [x] 3   [] 4   5. Need to walk in hospital room? [] 1   [] 2   [x] 3   [] 4   6. Climbing 3-5 steps with a railing? [] 1   [x] 2   [] 3   [] 4   © , Trustees of [facility] [employer], under license to [employer]. All rights reserved      Score:  Initial: 13 Most Recent: 19 (Date: 2020)    Interpretation of Tool:  Represents activities that are increasingly more difficult (i.e. Bed mobility, Transfers, Gait).     Medical Necessity:     · Patient is expected to demonstrate progress in   · strength, range of motion, balance, coordination, and functional technique  ·  to   · increase independence with all mobility. · .  Reason for Services/Other Comments:  · Patient continues to require skilled intervention due to   · medical complications and mobility deficits which impact his level of function, safety, and independence as indicated above. · .   Use of outcome tool(s) and clinical judgement create a POC that gives a: Questionable prediction of patient's progress: MODERATE COMPLEXITY        TREATMENT:      Pre-treatment Symptoms/Complaints: see above  Pain: Initial: 0/10 Post Session:  0/10       Therapeutic Activity: (    30 Minutes): Therapeutic activities including bed mobility training, transfer training, ambulation on UNEVEN ground, static/dynamic sitting and standing balance, instruction in sequencing with luke walker and gait mechanics, and patient education to improve mobility, strength and balance. Required moderate Safety awareness training; Tactile cues; Verbal cues to promote static and dynamic balance in standing, promote coordination of bilateral, lower extremity(s) and promote motor control of bilateral, lower extremity(s). Braces/Orthotics/Lines/Etc:   · L LE AFO  Treatment/Session Assessment:    · Response to Treatment:  See above  · Interdisciplinary Collaboration:   o Physical Therapy Assistant  o Registered Nurse  o Rehabilitation Attendant  · After treatment position/precautions:   o Supine in bed  o Bed alarm/tab alert on  o Bed/Chair-wheels locked  o Bed in low position  o Call light within reach  o RN notified   · Compliance with Program/Exercises: Compliant all of the time  · Recommendations/Intent for next treatment session: \"Next visit will focus on advancements to more challenging activities and reduction in assistance provided\".     Total Treatment Duration:  PT Patient Time In/Time Out  Time In: 1325  Time Out: 2621 CHANDRA Harrington, PTA

## 2020-06-05 NOTE — PROGRESS NOTES
Patient is calm  Alert  Provided supportive presence    Talked about his journey (health, family, rehab, financial)    Patient remains positive in all areas    Prayer      Artur Morin, staff

## 2020-06-05 NOTE — PROGRESS NOTES
Progress Note    2020  Admit Date: 2019  9:07 AM   NAME: Avelino Bacon   :  1975   MRN:  [id number]   Attending: Leticia Boothe MD  PCP:  Yonatan Lopez MD  Treatment Team: Attending Provider: Leticia Boothe MD; Care Manager: Dejon Wu [facility]; Utilization Review: Hao Grossman RN; Nurse Practitioner: Donnie Taylor NP; Hospitalist: Gregorio Henderson NP; Utilization Review: Lee Calderon RN; Nurse Practitioner: Charan Sesay NP; Charge Nurse: Blane Kaiser; Physical Therapy Assistant: Zabrina Paredes PTA    Full Code   SUBJECTIVE:     Mr. Sebastián Ramos is a 54 yo male with PMH of DM, HTN who presented with c/o left sided weakness and left facial droop 19.   CT head showed age indeterminate infarctions within the left corona radiata/caudate.  CTA head/neck showed stenosis at the distal segment of the right vertebral artery and multifocal stenosis in the P2 segment of the left posterior cerebral artery. MRI brain showed acute to early subacute infarcts in the left cerebellar hemisphere, in the periventricular deep left frontoparietal white matter and likely additional areas of restricted diffusion in the right paramedian yariel.   Echo +PFO.  On statin, ASA.  Has been difficult to place in STR. Plans for 4 week event monitor on DC and if no arrhythmia PFO closure recommended. Pt remains stable today. Awaiting placement, medicaid pending. Denies complaints. Progressing well with PT. Past Medical History:   Diagnosis Date    Acute ischemic stroke ([address] 2019    Acute pancreatitis 2014    Cerebrovascular accident (CVA) due to embolism ([address] 2019    Diabetes ([facility] 2002    Diabetes ([facility]     Diabetes mellitus     Hypertension      No results found for this or any previous visit (from the past 25 hour(s)).   No Known Allergies  Current Facility-Administered Medications   Medication Dose Route Frequency Provider Last Rate Last Dose    magnesium oxide (MAG-OX) tablet 400 mg  400 mg Oral BID Tea Gary NP   400 mg at 06/05/20 0910    metFORMIN (GLUCOPHAGE) tablet 500 mg  500 mg Oral BID WITH MEALS Mortimer Hinders, NP   500 mg at 06/05/20 0910    acetaminophen (TYLENOL) tablet 650 mg  650 mg Oral Q4H PRN Paulina Osborne MD   650 mg at 05/04/20 3693    diphenhydrAMINE (BENADRYL) capsule 25 mg  25 mg Oral Q6H PRN Adelita Norwood MD   25 mg at 06/04/20 1616    acetaminophen (TYLENOL) tablet 650 mg  650 mg Oral Sandie Corrigan MD   650 mg at 06/05/20 1427    lip protectant (BLISTEX) ointment 1 Each  1 Each Topical PRN Joe Bernal MD        metoprolol succinate (TOPROL-XL) XL tablet 25 mg  25 mg Oral DAILY Paulina Osborne MD   25 mg at 06/05/20 0910    polyethylene glycol (MIRALAX) packet 17 g  17 g Oral DAILY PRN Kitty TARIQ DO   17 g at 06/04/20 1444    guaiFENesin (ROBITUSSIN) 100 mg/5 mL oral liquid 100 mg  100 mg Oral Q4H PRN Paulina Osborne MD   100 mg at 06/04/20 1616    hydrALAZINE (APRESOLINE) 20 mg/mL injection 20 mg  20 mg IntraVENous Q4H PRN Princess Enrico MD   20 mg at 04/17/20 0510    atorvastatin (LIPITOR) tablet 80 mg  80 mg Oral QHS Princess Enrico MD   80 mg at 06/04/20 2142    lisinopril (PRINIVIL, ZESTRIL) tablet 40 mg  40 mg Oral DAILY Princess Enrico MD   40 mg at 06/05/20 9164    sodium chloride (NS) flush 5-40 mL  5-40 mL IntraVENous PRN Princess Enrico MD   10 mL at 05/27/20 1351    ondansetron (ZOFRAN) injection 4 mg  4 mg IntraVENous Q4H PRN Princess Enrico MD        aspirin chewable tablet 81 mg  81 mg Oral DAILY Princess Enrico MD   81 mg at 06/05/20 0910    enoxaparin (LOVENOX) injection 40 mg  40 mg SubCUTAneous Q24H Princess Enrico MD   40 mg at 06/05/20 1427    dextrose 40% (GLUTOSE) oral gel 1 Tube  15 g Oral PRN Princess Enrico MD        glucagon [facility]) injection 1 mg  1 mg IntraMUSCular PRN Becky Cervantes Reema Nation MD        dextrose (D50W) injection syrg 12.5-25 g  25-50 mL IntraVENous PRN Melo Britton MD           Review of Systems negative with exception of pertinent positives noted above  PHYSICAL EXAM     Visit Vitals  /72   Pulse 64   Temp 98.3 °F (36.8 °C)   Resp 14   Ht 5' 6\" (1.676 m)   Wt 79.8 kg (175 lb 14.4 oz)   SpO2 96%   BMI 28.39 kg/m²      Temp (24hrs), Av.1 °F (36.7 °C), Min:97.9 °F (36.6 °C), Max:98.6 °F (37 °C)    Oxygen Therapy  O2 Sat (%): 96 % (20 1435)  Pulse via Oximetry: 75 beats per minute (20 0400)  O2 Device: Room air (20 1648)    Intake/Output Summary (Last 24 hours) at 2020 1535  Last data filed at 2020 1435  Gross per 24 hour   Intake 480 ml   Output    Net 480 ml      General:       No acute distress    Lungs:          CTA bilaterally. Resp even and nonlabored  Heart:            S1S2 present without murmurs rubs gallops. RRR. No LE edema  Abdomen:    Soft, non tender, non distended. BS present  Extremities: No cyanosis. Moves LUE at elbow  Neurologic:  moves right hand and raises arm at elbow moderately, moves left arm at elbow mildly slurred speech.  PERRLA, strength 4/5 RUE/RLE,  2/5 LUE, LLE 1/5    Results summary of Diagnostic Studies/Procedures copied from within [facility] EMR:      Edmar Dixon 96 Problems    Diagnosis Date Noted    Hypomagnesemia 2020    PFO (patent foramen ovale) 2019    Arrhythmia 12/15/2019    Hyperlipemia     Acute [facility]     Type 2 diabetes mellitus ([facility]     Hypertension      Plan:  Acute embolic stroke  MRI brain showed acute to early subacute infarcts in the left cerebellar hemisphere, in the periventricular deep left frontoparietal white matter and likely additional areas of restricted diffusion in the right paramedian yariel.    +PFO on echo  On ASA, statin   NP spoke to Cards, plans for PFO closure after DC to facility     Hypomagnesemia   on oral supplementation        Hypertension  Continue metoprolol and ACE inhibitor  Goal BP <130/80     Type 2 diabetes mellitus:    Monitor, BGL controlled   A1C 6.9      Medically stable for DC.  Awaiting Medicaid approval.         Notes, labs, VS, diagnostic testing reviewed  Time spent with pt 20 min           DVT Prophylaxis: lovenox        Plan of Care Discussed with: Supervising MD Dr. Fiorella Friedman, care team, pt        James Nails NP

## 2020-06-06 PROCEDURE — 74011250637 HC RX REV CODE- 250/637: Performed by: INTERNAL MEDICINE

## 2020-06-06 PROCEDURE — 74011250637 HC RX REV CODE- 250/637: Performed by: NURSE PRACTITIONER

## 2020-06-06 PROCEDURE — 65270000029 HC RM PRIVATE

## 2020-06-06 PROCEDURE — 74011250636 HC RX REV CODE- 250/636: Performed by: INTERNAL MEDICINE

## 2020-06-06 PROCEDURE — 86580 TB INTRADERMAL TEST: CPT | Performed by: NURSE PRACTITIONER

## 2020-06-06 PROCEDURE — 74011000302 HC RX REV CODE- 302: Performed by: NURSE PRACTITIONER

## 2020-06-06 PROCEDURE — 74011250637 HC RX REV CODE- 250/637: Performed by: HOSPITALIST

## 2020-06-06 RX ADMIN — ACETAMINOPHEN 650 MG: 325 TABLET, FILM COATED ORAL at 15:30

## 2020-06-06 RX ADMIN — METFORMIN HYDROCHLORIDE 500 MG: 500 TABLET ORAL at 08:52

## 2020-06-06 RX ADMIN — ACETAMINOPHEN 650 MG: 325 TABLET, FILM COATED ORAL at 05:22

## 2020-06-06 RX ADMIN — LISINOPRIL 40 MG: 20 TABLET ORAL at 08:52

## 2020-06-06 RX ADMIN — ASPIRIN 81 MG 81 MG: 81 TABLET ORAL at 08:52

## 2020-06-06 RX ADMIN — ACETAMINOPHEN 650 MG: 325 TABLET, FILM COATED ORAL at 21:45

## 2020-06-06 RX ADMIN — ENOXAPARIN SODIUM 40 MG: 40 INJECTION SUBCUTANEOUS at 15:30

## 2020-06-06 RX ADMIN — ATORVASTATIN CALCIUM 80 MG: 80 TABLET, FILM COATED ORAL at 21:45

## 2020-06-06 RX ADMIN — DIPHENHYDRAMINE HYDROCHLORIDE 25 MG: 25 CAPSULE ORAL at 18:40

## 2020-06-06 RX ADMIN — METOPROLOL SUCCINATE 25 MG: 25 TABLET, FILM COATED, EXTENDED RELEASE ORAL at 08:52

## 2020-06-06 RX ADMIN — Medication 400 MG: at 18:40

## 2020-06-06 RX ADMIN — Medication 400 MG: at 08:52

## 2020-06-06 RX ADMIN — GUAIFENESIN 100 MG: 100 SOLUTION ORAL at 18:40

## 2020-06-06 RX ADMIN — TUBERCULIN PURIFIED PROTEIN DERIVATIVE 5 UNITS: 5 INJECTION, SOLUTION INTRADERMAL at 14:45

## 2020-06-06 RX ADMIN — METFORMIN HYDROCHLORIDE 500 MG: 500 TABLET ORAL at 18:40

## 2020-06-06 NOTE — PROGRESS NOTES
Problem: Falls - Risk of  Goal: *Absence of Falls  Description: Document Devere Kintyre Fall Risk and appropriate interventions in the flowsheet.   Outcome: Progressing Towards Goal  Note: Fall Risk Interventions:  Mobility Interventions: Communicate number of staff needed for ambulation/transfer, Patient to call before getting OOB    Mentation Interventions: Adequate sleep, hydration, pain control, Bed/chair exit alarm    Medication Interventions: Patient to call before getting OOB, Teach patient to arise slowly    Elimination Interventions: Call light in reach, Patient to call for help with toileting needs, Toileting schedule/hourly rounds    History of Falls Interventions: Bed/chair exit alarm, Door open when patient unattended

## 2020-06-06 NOTE — PROGRESS NOTES
Hospitalist Progress Note     Admit Date:  2019  9:07 AM   Name:  Fernanda Vieyra   Age:  53 y.o.  :  1975   MRN:  [id number]   PCP:  Tiana Camara MD  Treatment Team: Attending Provider: Loney Cushing, MD; Care Manager: Chaka Laurent [facility]; Utilization Review: Ashlee Serrano RN; Nurse Practitioner: Kizzy Morillo NP; Hospitalist: Gregory Brown NP; Utilization Review: Mel Desai RN; Nurse Practitioner: Cris Garcia NP    Subjective:   HPI and or CC:  52 yo AA male with PMH of DM, HTN admitted  for CVA affecting numerous areas of the brain. He was placed on ASA and statin. He has remained alert and oriented x3. Some movement to right side but unable to grasp with right hand. He is currently waiting on placement and this has remained difficult due to waiting on Medicaid. :  Alert and oriented x3. Voices no complaints today. Continues to wait for placement. PT/OT following patient. Continues to wait on SSI and Medicaid approval.  **Per CM note , patient accepted at [facility] with LOC approval pending. Stable to discharge once accepted. COVID test needed 2 days prior to d/c.         Objective:     Patient Vitals for the past 24 hrs:   Temp Pulse Resp BP SpO2   20 0800 97.9 °F (36.6 °C) 74 17 115/80 97 %   20 0400 97.9 °F (36.6 °C) 77 16 130/72 96 %   20 0000 98 °F (36.7 °C) 68 16 123/80 95 %   20 2000 98 °F (36.7 °C) 79 16 112/75 94 %   20 1435 98.3 °F (36.8 °C) 64 14 109/72 96 %   20 1133 97.9 °F (36.6 °C) 79 12 121/76 98 %     Oxygen Therapy  O2 Sat (%): 97 % (20 0800)  Pulse via Oximetry: 75 beats per minute (20 0400)  O2 Device: Room air (20 1648)    Intake/Output Summary (Last 24 hours) at 2020 1125  Last data filed at 2020 0754  Gross per 24 hour   Intake 490 ml   Output    Net 490 ml         REVIEW OF SYSTEMS: Comprehensive ROS performed and negative except as stated in HPI.    Physical Examination:  General:       No acute distress    Lungs:          CTA bilaterally. Resp even and nonlabored  Heart:            S1S2 present without murmurs rubs gallops. RRR. No LE edema  Abdomen:    Soft, non tender, non distended. BS present  Extremities: No cyanosis. Left sided hemiparesis  Neurologic:  moves right hand and raises arm at elbow moderately, unable to squeeze my fingers left hand, mildly slurred speech.  PERRLA, strength 4/5 RUE/RLE. Data Review:  I have reviewed all labs, meds, telemetry events, and studies from the last 24 hours. No results found for this or any previous visit (from the past 24 hour(s)).      All Micro Results     None          Current Meds:  Current Facility-Administered Medications   Medication Dose Route Frequency    magnesium oxide (MAG-OX) tablet 400 mg  400 mg Oral BID    metFORMIN (GLUCOPHAGE) tablet 500 mg  500 mg Oral BID WITH MEALS    acetaminophen (TYLENOL) tablet 650 mg  650 mg Oral Q4H PRN    diphenhydrAMINE (BENADRYL) capsule 25 mg  25 mg Oral Q6H PRN    acetaminophen (TYLENOL) tablet 650 mg  650 mg Oral Q8H    lip protectant (BLISTEX) ointment 1 Each  1 Each Topical PRN    metoprolol succinate (TOPROL-XL) XL tablet 25 mg  25 mg Oral DAILY    polyethylene glycol (MIRALAX) packet 17 g  17 g Oral DAILY PRN    guaiFENesin (ROBITUSSIN) 100 mg/5 mL oral liquid 100 mg  100 mg Oral Q4H PRN    hydrALAZINE (APRESOLINE) 20 mg/mL injection 20 mg  20 mg IntraVENous Q4H PRN    atorvastatin (LIPITOR) tablet 80 mg  80 mg Oral QHS    lisinopril (PRINIVIL, ZESTRIL) tablet 40 mg  40 mg Oral DAILY    sodium chloride (NS) flush 5-40 mL  5-40 mL IntraVENous PRN    ondansetron (ZOFRAN) injection 4 mg  4 mg IntraVENous Q4H PRN    aspirin chewable tablet 81 mg  81 mg Oral DAILY    enoxaparin (LOVENOX) injection 40 mg  40 mg SubCUTAneous Q24H    dextrose 40% (GLUTOSE) oral gel 1 Tube  15 g Oral PRN    glucagon (GLUCAGEN) injection 1 mg  1 mg IntraMUSCular PRN    dextrose (D50W) injection syrg 12.5-25 g  25-50 mL IntraVENous PRN       Diet:  DIET NUTRITIONAL SUPPLEMENTS  DIET DIABETIC CONSISTENT CARB    Other Studies (last 24 hours):  No results found. Assessment and Plan:     Hospital Problems as of 6/6/2020 Date Reviewed: 3/3/2017          Codes Class Noted - Resolved POA    Hypomagnesemia ICD-10-CM: E83.42  ICD-9-CM: 275.2  3/7/2020 - Present Unknown        PFO (patent foramen ovale) ICD-10-CM: Q21.1  ICD-9-CM: 745.5  12/17/2019 - Present Yes        Arrhythmia ICD-10-CM: I49.9  ICD-9-CM: 427.9  12/15/2019 - Present Yes        Hyperlipemia ICD-10-CM: E78.5  ICD-9-CM: 272.4  12/15/2019 - Present Yes        * (Principal) Acute embolic stroke ([facility] [address] EPE-02-LF: I63.9  ICD-9-CM: 434.11  12/12/2019 - Present Yes        Hypertension (Chronic) ICD-10-CM: I10  ICD-9-CM: 401.9  Unknown - Present Yes        Type 2 diabetes mellitus (HCC) (Chronic) ICD-10-CM: E11.9  ICD-9-CM: 250.00  Unknown - Present Yes              A/P:    Acute embolic stroke  MRI brain showed acute to early subacute infarcts in the left cerebellar hemisphere, in the periventricular deep left frontoparietal white matter and likely additional areas of restricted diffusion in the right paramedian yariel. +PFO on echo  On ASA, statin  Per PT recommendations, orthotist consult for AFO for L LE. Will need shunt closure once discharged-  4/30-I spoke with cardiology, they state PFO closure can wait until patient discharged to a facility.     Hypomagnesemia  Resolved       Hypertension  Continue metoprolol and ACE inhibitor  Goal BP <130/80     Type 2 diabetes mellitus:    Monitor, BGL controlled        Signed:  Mandy Osborn NP

## 2020-06-07 LAB
MM INDURATION POC: 0 MM (ref 0–5)
PPD POC: NEGATIVE NEGATIVE

## 2020-06-07 PROCEDURE — 74011250637 HC RX REV CODE- 250/637: Performed by: INTERNAL MEDICINE

## 2020-06-07 PROCEDURE — 74011250637 HC RX REV CODE- 250/637: Performed by: NURSE PRACTITIONER

## 2020-06-07 PROCEDURE — 74011250637 HC RX REV CODE- 250/637: Performed by: HOSPITALIST

## 2020-06-07 PROCEDURE — 65270000029 HC RM PRIVATE

## 2020-06-07 PROCEDURE — 74011250636 HC RX REV CODE- 250/636: Performed by: INTERNAL MEDICINE

## 2020-06-07 RX ADMIN — GUAIFENESIN 100 MG: 100 SOLUTION ORAL at 22:22

## 2020-06-07 RX ADMIN — Medication 400 MG: at 17:21

## 2020-06-07 RX ADMIN — DIPHENHYDRAMINE HYDROCHLORIDE 25 MG: 25 CAPSULE ORAL at 22:44

## 2020-06-07 RX ADMIN — ENOXAPARIN SODIUM 40 MG: 40 INJECTION SUBCUTANEOUS at 17:21

## 2020-06-07 RX ADMIN — DIPHENHYDRAMINE HYDROCHLORIDE 25 MG: 25 CAPSULE ORAL at 17:21

## 2020-06-07 RX ADMIN — METFORMIN HYDROCHLORIDE 500 MG: 500 TABLET ORAL at 17:21

## 2020-06-07 RX ADMIN — ACETAMINOPHEN 650 MG: 325 TABLET, FILM COATED ORAL at 17:21

## 2020-06-07 RX ADMIN — ATORVASTATIN CALCIUM 80 MG: 80 TABLET, FILM COATED ORAL at 22:22

## 2020-06-07 RX ADMIN — Medication 400 MG: at 08:32

## 2020-06-07 RX ADMIN — ACETAMINOPHEN 650 MG: 325 TABLET, FILM COATED ORAL at 05:41

## 2020-06-07 RX ADMIN — ACETAMINOPHEN 650 MG: 325 TABLET, FILM COATED ORAL at 22:22

## 2020-06-07 RX ADMIN — METFORMIN HYDROCHLORIDE 500 MG: 500 TABLET ORAL at 08:32

## 2020-06-07 RX ADMIN — GUAIFENESIN 100 MG: 100 SOLUTION ORAL at 17:21

## 2020-06-07 RX ADMIN — LISINOPRIL 40 MG: 20 TABLET ORAL at 08:31

## 2020-06-07 RX ADMIN — METOPROLOL SUCCINATE 25 MG: 25 TABLET, FILM COATED, EXTENDED RELEASE ORAL at 08:32

## 2020-06-07 RX ADMIN — ASPIRIN 81 MG 81 MG: 81 TABLET ORAL at 08:32

## 2020-06-07 NOTE — PROGRESS NOTES
Problem: Pressure Injury - Risk of  Goal: *Prevention of pressure injury  Description: Document Dewey Scale and appropriate interventions in the flowsheet. Outcome: Progressing Towards Goal  Note: Pressure Injury Interventions:  Sensory Interventions: Assess changes in LOC    Moisture Interventions: Absorbent underpads, Check for incontinence Q2 hours and as needed, Limit adult briefs    Activity Interventions: Increase time out of bed, Pressure redistribution bed/mattress(bed type), PT/OT evaluation    Mobility Interventions: HOB 30 degrees or less, Pressure redistribution bed/mattress (bed type), PT/OT evaluation    Nutrition Interventions: Document food/fluid/supplement intake, Offer support with meals,snacks and hydration    Friction and Shear Interventions: HOB 30 degrees or less, Apply protective barrier, creams and emollients                Problem: Patient Education: Go to Patient Education Activity  Goal: Patient/Family Education  Outcome: Progressing Towards Goal     Problem: Falls - Risk of  Goal: *Absence of Falls  Description: Document Jeanne Fall Risk and appropriate interventions in the flowsheet.   Outcome: Progressing Towards Goal  Note: Fall Risk Interventions:  Mobility Interventions: Communicate number of staff needed for ambulation/transfer, Patient to call before getting OOB    Mentation Interventions: Adequate sleep, hydration, pain control, Bed/chair exit alarm    Medication Interventions: Bed/chair exit alarm, Patient to call before getting OOB, Teach patient to arise slowly    Elimination Interventions: Call light in reach, Patient to call for help with toileting needs, Toileting schedule/hourly rounds    History of Falls Interventions: Bed/chair exit alarm, Door open when patient unattended         Problem: Patient Education: Go to Patient Education Activity  Goal: Patient/Family Education  Outcome: Progressing Towards Goal     Problem: Pain  Goal: *Control of Pain  Outcome: Progressing Towards Goal     Problem: Patient Education: Go to Patient Education Activity  Goal: Patient/Family Education  Outcome: Progressing Towards Goal

## 2020-06-07 NOTE — PROGRESS NOTES
Hospitalist Progress Note     Admit Date:  2019  9:07 AM   Name:  Dio Pavon   Age:  53 y.o.  :  1975   MRN:  [id number]   PCP:  Constantine Rocha MD  Treatment Team: Attending Provider: Michelle Guzman MD; Care Manager: Mavis Torre LMSW; Utilization Review: Bonnie Ramos RN; Nurse Practitioner: Doris Humphrey NP; Hospitalist: Keenan Holden NP; Utilization Review: Koby Latham RN; Nurse Practitioner: Sultana Bhagat NP    Subjective:   HPI and or CC:  54 yo AA male with PMH of DM, HTN admitted  for CVA affecting numerous areas of the brain. He was placed on ASA and statin. He has remained alert and oriented x3. Some movement to right side but unable to grasp with right hand. He is currently waiting on placement and this has remained difficult due to waiting on Medicaid. :  Alert and oriented x3. Jean Pierre Guzman Continues to wait for placement. PT/OT following patient. Continues to wait on SSI and Medicaid approval.  **Per CM note , patient accepted at [facility] with LOC approval pending. COVID test done . Objective:     Patient Vitals for the past 24 hrs:   Temp Pulse Resp BP SpO2   20 0800 97.8 °F (36.6 °C) 79 18 139/81 90 %   20 0424 97.9 °F (36.6 °C) 77 18 132/88 98 %   20 0007 98.2 °F (36.8 °C) 69 18 124/83 97 %   20 2004 98.2 °F (36.8 °C) 81 18 118/79 96 %   20 1600 97.7 °F (36.5 °C) 81 18 114/75 92 %   20 1200 98.3 °F (36.8 °C) 90 18 125/83 97 %     Oxygen Therapy  O2 Sat (%): 90 % (20 0800)  Pulse via Oximetry: 75 beats per minute (20 0400)  O2 Device: Room air (20 1648)    Intake/Output Summary (Last 24 hours) at 2020 1049  Last data filed at 2020  Gross per 24 hour   Intake 222 ml   Output    Net 222 ml         REVIEW OF SYSTEMS: Comprehensive ROS performed and negative except as stated in HPI.     Physical Examination:  General:       No acute distress    Lungs:          CTA bilaterally. Resp even and nonlabored  Heart:            S1S2 present without murmurs rubs gallops. RRR. No LE edema  Abdomen:    Soft, non tender, non distended. BS present  Extremities: No cyanosis. Left sided hemiparesis  Neurologic:  moves right hand and raises arm at elbow moderately, unable to squeeze my fingers left hand, mildly slurred speech.  PERRLA, strength 4/5 RUE/RLE. Data Review:  I have reviewed all labs, meds, telemetry events, and studies from the last 24 hours. No results found for this or any previous visit (from the past 24 hour(s)).      All Micro Results     Procedure Component Value Units Date/Time    EMERGENT DISEASE PANEL [[id number]]     Order Status:  Sent           Current Meds:  Current Facility-Administered Medications   Medication Dose Route Frequency    tuberculin injection 5 Units  5 Units IntraDERMal ONCE    magnesium oxide (MAG-OX) tablet 400 mg  400 mg Oral BID    metFORMIN (GLUCOPHAGE) tablet 500 mg  500 mg Oral BID WITH MEALS    acetaminophen (TYLENOL) tablet 650 mg  650 mg Oral Q4H PRN    diphenhydrAMINE (BENADRYL) capsule 25 mg  25 mg Oral Q6H PRN    acetaminophen (TYLENOL) tablet 650 mg  650 mg Oral Q8H    lip protectant (BLISTEX) ointment 1 Each  1 Each Topical PRN    metoprolol succinate (TOPROL-XL) XL tablet 25 mg  25 mg Oral DAILY    polyethylene glycol (MIRALAX) packet 17 g  17 g Oral DAILY PRN    guaiFENesin (ROBITUSSIN) 100 mg/5 mL oral liquid 100 mg  100 mg Oral Q4H PRN    hydrALAZINE (APRESOLINE) 20 mg/mL injection 20 mg  20 mg IntraVENous Q4H PRN    atorvastatin (LIPITOR) tablet 80 mg  80 mg Oral QHS    lisinopril (PRINIVIL, ZESTRIL) tablet 40 mg  40 mg Oral DAILY    sodium chloride (NS) flush 5-40 mL  5-40 mL IntraVENous PRN    ondansetron (ZOFRAN) injection 4 mg  4 mg IntraVENous Q4H PRN    aspirin chewable tablet 81 mg  81 mg Oral DAILY    enoxaparin (LOVENOX) injection 40 mg  40 mg SubCUTAneous Q24H    dextrose 40% (GLUTOSE) oral gel 1 Tube  15 g Oral PRN    glucagon (GLUCAGEN) injection 1 mg  1 mg IntraMUSCular PRN    dextrose (D50W) injection syrg 12.5-25 g  25-50 mL IntraVENous PRN       Diet:  DIET NUTRITIONAL SUPPLEMENTS  DIET DIABETIC CONSISTENT CARB    Other Studies (last 24 hours):  No results found. Assessment and Plan:     Hospital Problems as of 6/7/2020 Date Reviewed: 3/3/2017          Codes Class Noted - Resolved POA    Hypomagnesemia ICD-10-CM: E83.42  ICD-9-CM: 275.2  3/7/2020 - Present Unknown        PFO (patent foramen ovale) ICD-10-CM: Q21.1  ICD-9-CM: 745.5  12/17/2019 - Present Yes        Arrhythmia ICD-10-CM: I49.9  ICD-9-CM: 427.9  12/15/2019 - Present Yes        Hyperlipemia ICD-10-CM: E78.5  ICD-9-CM: 272.4  12/15/2019 - Present Yes        * (Principal) Acute embolic stroke ([facility] [address] [facility]-AH: I63.9  ICD-9-CM: 434.11  12/12/2019 - Present Yes        Hypertension (Chronic) ICD-10-CM: I10  ICD-9-CM: 401.9  Unknown - Present Yes        Type 2 diabetes mellitus (HCC) (Chronic) ICD-10-CM: E11.9  ICD-9-CM: 250.00  Unknown - Present Yes              A/P:    Acute embolic stroke  MRI brain showed acute to early subacute infarcts in the left cerebellar hemisphere, in the periventricular deep left frontoparietal white matter and likely additional areas of restricted diffusion in the right paramedian yariel. +PFO on echo  On ASA, statin  Per PT recommendations, orthotist consult for AFO for L LE. Will need shunt closure once discharged-  4/30-I spoke with cardiology, they state PFO closure can wait until patient discharged to a facility.     Hypomagnesemia  Resolved       Hypertension  Continue metoprolol and ACE inhibitor  Goal BP <130/80     Type 2 diabetes mellitus:    Monitor, BGL controlled        Signed:  Stefania Keys NP

## 2020-06-08 LAB
ANION GAP SERPL CALC-SCNC: 4 MMOL/L (ref 7–16)
BASOPHILS # BLD: 0 K/UL (ref 0–0.2)
BASOPHILS NFR BLD: 0 % (ref 0–2)
BUN SERPL-MCNC: 23 MG/DL (ref 6–23)
CALCIUM SERPL-MCNC: 9.3 MG/DL (ref 8.3–10.4)
CHLORIDE SERPL-SCNC: 111 MMOL/L (ref 98–107)
CO2 SERPL-SCNC: 27 MMOL/L (ref 21–32)
CREAT SERPL-MCNC: 1.19 MG/DL (ref 0.8–1.5)
DIFFERENTIAL METHOD BLD: ABNORMAL
EOSINOPHIL # BLD: 0.1 K/UL (ref 0–0.8)
EOSINOPHIL NFR BLD: 2 % (ref 0.5–7.8)
ERYTHROCYTE [DISTWIDTH] IN BLOOD BY AUTOMATED COUNT: 15.4 % (ref 11.9–14.6)
GLUCOSE SERPL-MCNC: 76 MG/DL (ref 65–100)
HCT VFR BLD AUTO: 34.3 % (ref 41.1–50.3)
HGB BLD-MCNC: 11.1 G/DL (ref 13.6–17.2)
IMM GRANULOCYTES # BLD AUTO: 0 K/UL (ref 0–0.5)
IMM GRANULOCYTES NFR BLD AUTO: 0 % (ref 0–5)
LYMPHOCYTES # BLD: 2.4 K/UL (ref 0.5–4.6)
LYMPHOCYTES NFR BLD: 49 % (ref 13–44)
MAGNESIUM SERPL-MCNC: 1.8 MG/DL (ref 1.8–2.4)
MCH RBC QN AUTO: 27.4 PG (ref 26.1–32.9)
MCHC RBC AUTO-ENTMCNC: 32.4 G/DL (ref 31.4–35)
MCV RBC AUTO: 84.7 FL (ref 79.6–97.8)
MM INDURATION POC: 0 0 (ref 0–5)
MONOCYTES # BLD: 0.3 K/UL (ref 0.1–1.3)
MONOCYTES NFR BLD: 7 % (ref 4–12)
NEUTS SEG # BLD: 2.1 K/UL (ref 1.7–8.2)
NEUTS SEG NFR BLD: 42 % (ref 43–78)
NRBC # BLD: 0 K/UL (ref 0–0.2)
PLATELET # BLD AUTO: 164 K/UL (ref 150–450)
PMV BLD AUTO: 11.5 FL (ref 9.4–12.3)
POTASSIUM SERPL-SCNC: 4.4 MMOL/L (ref 3.5–5.1)
PPD POC: NEGATIVE NEGATIVE
RBC # BLD AUTO: 4.05 M/UL (ref 4.23–5.6)
SODIUM SERPL-SCNC: 142 MMOL/L (ref 136–145)
WBC # BLD AUTO: 5 K/UL (ref 4.3–11.1)

## 2020-06-08 PROCEDURE — 80048 BASIC METABOLIC PNL TOTAL CA: CPT

## 2020-06-08 PROCEDURE — 74011250637 HC RX REV CODE- 250/637: Performed by: HOSPITALIST

## 2020-06-08 PROCEDURE — 74011250637 HC RX REV CODE- 250/637: Performed by: NURSE PRACTITIONER

## 2020-06-08 PROCEDURE — 74011250636 HC RX REV CODE- 250/636: Performed by: INTERNAL MEDICINE

## 2020-06-08 PROCEDURE — 85025 COMPLETE CBC W/AUTO DIFF WBC: CPT

## 2020-06-08 PROCEDURE — 36415 COLL VENOUS BLD VENIPUNCTURE: CPT

## 2020-06-08 PROCEDURE — 74011250637 HC RX REV CODE- 250/637: Performed by: INTERNAL MEDICINE

## 2020-06-08 PROCEDURE — 65270000029 HC RM PRIVATE

## 2020-06-08 PROCEDURE — 83735 ASSAY OF MAGNESIUM: CPT

## 2020-06-08 RX ADMIN — METFORMIN HYDROCHLORIDE 500 MG: 500 TABLET ORAL at 17:14

## 2020-06-08 RX ADMIN — DIPHENHYDRAMINE HYDROCHLORIDE 25 MG: 25 CAPSULE ORAL at 22:51

## 2020-06-08 RX ADMIN — METOPROLOL SUCCINATE 25 MG: 25 TABLET, FILM COATED, EXTENDED RELEASE ORAL at 08:16

## 2020-06-08 RX ADMIN — ATORVASTATIN CALCIUM 80 MG: 80 TABLET, FILM COATED ORAL at 22:51

## 2020-06-08 RX ADMIN — LISINOPRIL 40 MG: 20 TABLET ORAL at 08:16

## 2020-06-08 RX ADMIN — Medication 400 MG: at 08:16

## 2020-06-08 RX ADMIN — ENOXAPARIN SODIUM 40 MG: 40 INJECTION SUBCUTANEOUS at 14:42

## 2020-06-08 RX ADMIN — ACETAMINOPHEN 650 MG: 325 TABLET, FILM COATED ORAL at 22:52

## 2020-06-08 RX ADMIN — ACETAMINOPHEN 650 MG: 325 TABLET, FILM COATED ORAL at 06:22

## 2020-06-08 RX ADMIN — METFORMIN HYDROCHLORIDE 500 MG: 500 TABLET ORAL at 08:16

## 2020-06-08 RX ADMIN — ASPIRIN 81 MG 81 MG: 81 TABLET ORAL at 08:16

## 2020-06-08 RX ADMIN — Medication 400 MG: at 17:14

## 2020-06-08 RX ADMIN — DIPHENHYDRAMINE HYDROCHLORIDE 25 MG: 25 CAPSULE ORAL at 17:17

## 2020-06-08 RX ADMIN — GUAIFENESIN 100 MG: 100 SOLUTION ORAL at 17:17

## 2020-06-08 RX ADMIN — GUAIFENESIN 100 MG: 100 SOLUTION ORAL at 22:52

## 2020-06-08 RX ADMIN — ACETAMINOPHEN 650 MG: 325 TABLET, FILM COATED ORAL at 14:42

## 2020-06-08 NOTE — PROGRESS NOTES
Hospitalist Progress Note    Subjective:   Daily Progress Note: 6/8/2020 1034    See previous notes. 6/8: Has been accepted to [facility], excited to move forward, however anxious regarding where he will live after rehab. Waiting for medicaid ok. Labs today as below. Current Facility-Administered Medications   Medication Dose Route Frequency    magnesium oxide (MAG-OX) tablet 400 mg  400 mg Oral BID    metFORMIN (GLUCOPHAGE) tablet 500 mg  500 mg Oral BID WITH MEALS    acetaminophen (TYLENOL) tablet 650 mg  650 mg Oral Q4H PRN    diphenhydrAMINE (BENADRYL) capsule 25 mg  25 mg Oral Q6H PRN    acetaminophen (TYLENOL) tablet 650 mg  650 mg Oral Q8H    lip protectant (BLISTEX) ointment 1 Each  1 Each Topical PRN    metoprolol succinate (TOPROL-XL) XL tablet 25 mg  25 mg Oral DAILY    polyethylene glycol (MIRALAX) packet 17 g  17 g Oral DAILY PRN    guaiFENesin (ROBITUSSIN) 100 mg/5 mL oral liquid 100 mg  100 mg Oral Q4H PRN    hydrALAZINE (APRESOLINE) 20 mg/mL injection 20 mg  20 mg IntraVENous Q4H PRN    atorvastatin (LIPITOR) tablet 80 mg  80 mg Oral QHS    lisinopril (PRINIVIL, ZESTRIL) tablet 40 mg  40 mg Oral DAILY    sodium chloride (NS) flush 5-40 mL  5-40 mL IntraVENous PRN    ondansetron (ZOFRAN) injection 4 mg  4 mg IntraVENous Q4H PRN    aspirin chewable tablet 81 mg  81 mg Oral DAILY    enoxaparin (LOVENOX) injection 40 mg  40 mg SubCUTAneous Q24H    dextrose 40% (GLUTOSE) oral gel 1 Tube  15 g Oral PRN    glucagon (GLUCAGEN) injection 1 mg  1 mg IntraMUSCular PRN    dextrose (D50W) injection syrg 12.5-25 g  25-50 mL IntraVENous PRN      Review of Systems  A comprehensive review of systems was negative except for that written in the HPI.     Objective:     Visit Vitals  /77 (BP 1 Location: Right arm, BP Patient Position: Sitting)   Pulse 83   Temp 97.8 °F (36.6 °C)   Resp 18   Ht 5' 6\" (1.676 m)   Wt 79.8 kg (175 lb 14.4 oz)   SpO2 99%   BMI 28.39 kg/m²      O2 Device: Room air    Temp (24hrs), Av °F (36.7 °C), Min:97.8 °F (36.6 °C), Max:98.3 °F (36.8 °C)    1901 -  0700  In: 222 [P.O.:222]  Out: -     General appearance: Conversant, oriented. Denies need or new issues.    Head: Normocephalic, without obvious abnormality, atraumatic  Eyes: conjunctivae/corneas clear. PERRL  Throat: Continued mild left facial weakness.  Lips, mucosa, and tongue normal. Teeth and gums normal.  Eating normally. Neck: supple, symmetrical, trachea midline, no JVD  Lungs: clear to auscultation bilaterally  Heart: regular rate and rhythm, S1, S2 normal, no murmur, click, rub or gallop  Abdomen: soft, non-tender. Bowel sounds normal. No masses,  no organomegaly  Extremities: Persistent left side upper and lower residual weakness, improved vastly since admission.  Using brace.    Skin: Skin color, texture, turgor normal. No rashes or lesions  Neurologic: As above. Additional comments: Notes,orders, test results, vitals reviewed    Data Review  Recent Results (from the past 24 hour(s))   PLEASE READ & DOCUMENT PPD TEST IN 24 HRS    Collection Time: 20  2:45 PM   Result Value Ref Range    PPD Negative Negative    mm Induration 0 0 - 5 mm   CBC WITH AUTOMATED DIFF    Collection Time: 20  4:57 AM   Result Value Ref Range    WBC 5.0 4.3 - 11.1 K/uL    RBC 4.05 (L) 4.23 - 5.6 M/uL    HGB 11.1 (L) 13.6 - 17.2 g/dL    HCT 34.3 (L) 41.1 - 50.3 %    MCV 84.7 79.6 - 97.8 FL    MCH 27.4 26.1 - 32.9 PG    MCHC 32.4 31.4 - 35.0 g/dL    RDW 15.4 (H) 11.9 - 14.6 %    PLATELET 204 232 - 280 K/uL    MPV 11.5 9.4 - 12.3 FL    ABSOLUTE NRBC 0.00 0.0 - 0.2 K/uL    DF AUTOMATED      NEUTROPHILS 42 (L) 43 - 78 %    LYMPHOCYTES 49 (H) 13 - 44 %    MONOCYTES 7 4.0 - 12.0 %    EOSINOPHILS 2 0.5 - 7.8 %    BASOPHILS 0 0.0 - 2.0 %    IMMATURE GRANULOCYTES 0 0.0 - 5.0 %    ABS. NEUTROPHILS 2.1 1.7 - 8.2 K/UL    ABS. LYMPHOCYTES 2.4 0.5 - 4.6 K/UL    ABS. MONOCYTES 0.3 0.1 - 1.3 K/UL    ABS. EOSINOPHILS 0.1 0.0 - 0.8 K/UL    ABS. BASOPHILS 0.0 0.0 - 0.2 K/UL    ABS. IMM. GRANS. 0.0 0.0 - 0.5 K/UL   METABOLIC PANEL, BASIC    Collection Time: 20  4:57 AM   Result Value Ref Range    Sodium 142 136 - 145 mmol/L    Potassium 4.4 3.5 - 5.1 mmol/L    Chloride 111 (H) 98 - 107 mmol/L    CO2 27 21 - 32 mmol/L    Anion gap 4 (L) 7 - 16 mmol/L    Glucose 76 65 - 100 mg/dL    BUN 23 6 - 23 MG/DL    Creatinine 1.19 0.8 - 1.5 MG/DL    GFR est AA >60 >60 ml/min/1.73m2    GFR est non-AA >60 >60 ml/min/1.73m2    Calcium 9.3 8.3 - 10.4 MG/DL     Ma.8    Assessment/Plan:   Acute to early subacute infarcts in the left cerebellar hemisphere, in the periventricular deep left frontoparietal white matter and likely additional areas of restricted diffusion in the right paramedian yariel.  Old lacunar infarcts also.                  Making good progress with PT/OT                Speech and swallowing improved to               the extent SLP signed off                 Now approved for [place], waiting for medicaid ok     Dysarthria due to stroke:  Improved immensely, able to understand all speech.               SLP releasing       Difficult placement with marital discord prior to stroke:  Wife does not want him at home:  CM working on tirelessly                 Hypertension:  Continue lisinopril, toprol XL     NIDDM II:  Continue glucophage, diabetic diet. A1C:  8.4              DM education  and  management continues to          follow intermittently,  increased glucophage frequency                Discontinued SSI      Arrhythmia:  On beta blocker     PFO 2019:                Will need 4 week event monitor on  discharge:   Will  speak with them tomorrow              Will need PFO closure at some point after  discharge per Cards      Hypomagnesemia:  Replace and recheck               Increasing daily 400 mg dose to bid .             monitor weekly               3/2:  Continue bid dosing      Care Plan discussed with: Patient and Nurse     Signed By: Ivania Mccullough NP     June 8, 2020

## 2020-06-08 NOTE — DIABETES MGMT
Patient admitted with acute embolic stroke. Blood glucose this morning was 76. Creatinine 1.19. GFR >60. Reviewed patient current regimen: Metformin 500mg BID. Per CM note patient accepted at [facility] awaiting approval, would recommend continuing Metformin at discharge for management of diabetes. Will continue to follow along loosely.

## 2020-06-08 NOTE — PROGRESS NOTES
Problem: Falls - Risk of  Goal: *Absence of Falls  Description: Document [facility] Fall Risk and appropriate interventions in the flowsheet.   Outcome: Progressing Towards Goal  Note: Fall Risk Interventions:  Mobility Interventions: Bed/chair exit alarm, Patient to call before getting OOB, Utilize walker, cane, or other assistive device    Mentation Interventions: Door open when patient unattended, More frequent rounding, Toileting rounds    Medication Interventions: Bed/chair exit alarm, Patient to call before getting OOB    Elimination Interventions: Call light in reach, Patient to call for help with toileting needs    History of Falls Interventions: Bed/chair exit alarm, Door open when patient unattended         Problem: Patient Education: Go to Patient Education Activity  Goal: Patient/Family Education  Outcome: Progressing Towards Goal

## 2020-06-08 NOTE — PROGRESS NOTES
Nutrition Assessment for:   Follow up     Assessment:   Pt is 52 yo male who presented with weakness, facial drooping and slurred speech. He has history of CVA, DM type 2, HTN, pancreatitis, GERD and a smoker. He remains hospitalized awaiting medicaid eligibility.  Accepted at [facility]. Sleeping at RD visit, consumed noon meal.  Pt has continues to eat well on RD round checks between visits. He consumes glucerna ad aleena. Last recorded BM 6/6. Diet: DIET NUTRITIONAL SUPPLEMENTS All Meals; Glucerna Shake  DIET DIABETIC CONSISTENT CARB Mechanical Soft      Anthropometrics:  Height: 5' 6\" (167.6 cm), Weight: 95.3 kg (210 lb),  , Body mass index is 33.89 kg/m². BMI class of obese. Weight is from 12/12/19. Last 3 Recorded Weights in this Encounter    12/12/19 0906 04/13/20 1340   Weight: 95.3 kg (210 lb) 79.8 kg (175 lb 14.4 oz)   4/13 recorded as standing scale weight. Weight loss of ~17% consistent with muscle atrophy associated with limited mobility. Current Body mass index is 28.39 kg/m². BMI class of overweight. Pt continues without a new weight value. Pt has weekly weights ordered.     Macronutrient needs: (using Standing scale 80 kg) reassessed 4/27 with new weight  EER: 3197-8256 kcal/day (25-30 kcal/kg)  EPR:  g/day (1.2-1.3 g/kg)     Nutrition Intervention:  Meals and snacks: Continue current diet. Medical food supplement therapy: Continue glucerna TID. Based on available data and periodic checks of pt po consumption on unit his intake continues to appear adequate to meet needs. Coordination of nutrition care by a Nutrition Professional: Weekly weight remains ordered. Discharge Nutrition Plan: Continue Oral Nutrition Supplement (ONS) at discharge. Recommend Glucerna or a comparable/similar product Three times daily for 30 days unless otherwise directed by your Primary Care Physician.     [place], [place], [address]

## 2020-06-09 LAB
ANION GAP SERPL CALC-SCNC: 8 MMOL/L (ref 7–16)
BASOPHILS # BLD: 0 K/UL (ref 0–0.2)
BASOPHILS NFR BLD: 1 % (ref 0–2)
BUN SERPL-MCNC: 26 MG/DL (ref 6–23)
CALCIUM SERPL-MCNC: 9.5 MG/DL (ref 8.3–10.4)
CHLORIDE SERPL-SCNC: 110 MMOL/L (ref 98–107)
CO2 SERPL-SCNC: 24 MMOL/L (ref 21–32)
CREAT SERPL-MCNC: 1.17 MG/DL (ref 0.8–1.5)
DIFFERENTIAL METHOD BLD: ABNORMAL
EMERGENT DISEASE PANEL, EDPR: NOT DETECTED
EOSINOPHIL # BLD: 0.1 K/UL (ref 0–0.8)
EOSINOPHIL NFR BLD: 1 % (ref 0.5–7.8)
ERYTHROCYTE [DISTWIDTH] IN BLOOD BY AUTOMATED COUNT: 15.4 % (ref 11.9–14.6)
GLUCOSE SERPL-MCNC: 77 MG/DL (ref 65–100)
HCT VFR BLD AUTO: 33.9 % (ref 41.1–50.3)
HGB BLD-MCNC: 10.9 G/DL (ref 13.6–17.2)
IMM GRANULOCYTES # BLD AUTO: 0 K/UL (ref 0–0.5)
IMM GRANULOCYTES NFR BLD AUTO: 0 % (ref 0–5)
LYMPHOCYTES # BLD: 2.3 K/UL (ref 0.5–4.6)
LYMPHOCYTES NFR BLD: 44 % (ref 13–44)
MCH RBC QN AUTO: 27 PG (ref 26.1–32.9)
MCHC RBC AUTO-ENTMCNC: 32.2 G/DL (ref 31.4–35)
MCV RBC AUTO: 84.1 FL (ref 79.6–97.8)
MONOCYTES # BLD: 0.3 K/UL (ref 0.1–1.3)
MONOCYTES NFR BLD: 6 % (ref 4–12)
NEUTS SEG # BLD: 2.5 K/UL (ref 1.7–8.2)
NEUTS SEG NFR BLD: 47 % (ref 43–78)
NRBC # BLD: 0 K/UL (ref 0–0.2)
PLATELET # BLD AUTO: 170 K/UL (ref 150–450)
PMV BLD AUTO: 11.6 FL (ref 9.4–12.3)
POTASSIUM SERPL-SCNC: 4.4 MMOL/L (ref 3.5–5.1)
RBC # BLD AUTO: 4.03 M/UL (ref 4.23–5.6)
SODIUM SERPL-SCNC: 142 MMOL/L (ref 136–145)
WBC # BLD AUTO: 5.2 K/UL (ref 4.3–11.1)

## 2020-06-09 PROCEDURE — 36415 COLL VENOUS BLD VENIPUNCTURE: CPT

## 2020-06-09 PROCEDURE — 97530 THERAPEUTIC ACTIVITIES: CPT

## 2020-06-09 PROCEDURE — 65270000029 HC RM PRIVATE

## 2020-06-09 PROCEDURE — 80048 BASIC METABOLIC PNL TOTAL CA: CPT

## 2020-06-09 PROCEDURE — 74011250637 HC RX REV CODE- 250/637: Performed by: INTERNAL MEDICINE

## 2020-06-09 PROCEDURE — 74011250637 HC RX REV CODE- 250/637: Performed by: HOSPITALIST

## 2020-06-09 PROCEDURE — 74011250637 HC RX REV CODE- 250/637: Performed by: NURSE PRACTITIONER

## 2020-06-09 PROCEDURE — 74011250636 HC RX REV CODE- 250/636: Performed by: INTERNAL MEDICINE

## 2020-06-09 PROCEDURE — 85025 COMPLETE CBC W/AUTO DIFF WBC: CPT

## 2020-06-09 RX ADMIN — ATORVASTATIN CALCIUM 80 MG: 80 TABLET, FILM COATED ORAL at 22:39

## 2020-06-09 RX ADMIN — GUAIFENESIN 100 MG: 100 SOLUTION ORAL at 16:51

## 2020-06-09 RX ADMIN — LISINOPRIL 40 MG: 20 TABLET ORAL at 09:50

## 2020-06-09 RX ADMIN — Medication 400 MG: at 09:50

## 2020-06-09 RX ADMIN — ASPIRIN 81 MG 81 MG: 81 TABLET ORAL at 09:50

## 2020-06-09 RX ADMIN — ACETAMINOPHEN 650 MG: 325 TABLET, FILM COATED ORAL at 22:39

## 2020-06-09 RX ADMIN — ACETAMINOPHEN 650 MG: 325 TABLET, FILM COATED ORAL at 05:08

## 2020-06-09 RX ADMIN — ACETAMINOPHEN 650 MG: 325 TABLET, FILM COATED ORAL at 13:35

## 2020-06-09 RX ADMIN — DIPHENHYDRAMINE HYDROCHLORIDE 25 MG: 25 CAPSULE ORAL at 22:39

## 2020-06-09 RX ADMIN — Medication 400 MG: at 16:50

## 2020-06-09 RX ADMIN — ENOXAPARIN SODIUM 40 MG: 40 INJECTION SUBCUTANEOUS at 13:35

## 2020-06-09 RX ADMIN — METFORMIN HYDROCHLORIDE 500 MG: 500 TABLET ORAL at 09:49

## 2020-06-09 RX ADMIN — DIPHENHYDRAMINE HYDROCHLORIDE 25 MG: 25 CAPSULE ORAL at 16:51

## 2020-06-09 RX ADMIN — METOPROLOL SUCCINATE 25 MG: 25 TABLET, FILM COATED, EXTENDED RELEASE ORAL at 09:50

## 2020-06-09 RX ADMIN — METFORMIN HYDROCHLORIDE 500 MG: 500 TABLET ORAL at 16:51

## 2020-06-09 RX ADMIN — GUAIFENESIN 100 MG: 100 SOLUTION ORAL at 22:39

## 2020-06-09 NOTE — PROGRESS NOTES
Problem: Mobility Impaired (Adult and Pediatric)  Goal: *Acute Goals and Plan of Care  Description  REMAIN ONGOING ON RE-ASSESSMENT 6/1/2020  1. Patient will perform bed mobility with MODIFIED INDEPENDENCE and 0 verbal cues within 7 treatment days. PROGRESSING; CURRENTLY AT SUPERVISION  2. Patient will perform transfer bed to chair/wheelchair with SUPERVISION within 7 treatment days. PROGRESSING; CURRENTLY AT CGA  3. Patient will demonstrate FAIR+ dynamic balance throughout ambulation within 7 treatment days. PROGRESSING; CURRENTLY AT FAIR  4. Patient demonstrate 3+/5 strength in L LE hip flexion, hip abduction, hip adduction, and knee extension (quad strength) within 7 treatment days. PROGRESSING SEE STRENGTH ASSESSMENT BELOW  5. Patient will ambulate 300ft+ with STAND BY ASSIST and least retrictive assistive device and L LE AFO within 7 treatment days. PROGRESSING CURRENTLY AT CGA FOR 300FT  6. Patient will perform wheelchair mobility 75ft with SUPERVISION and 0 verbal cues within 7 treatment days. PROGRESSING CURRENTLY REQUIRING CGA FOR 50 FT  7. Patient will don/doff LUE sling for protection of L shoulder during transfers/gait with set up within 7 treatment days. PROGRESSING REQUIRING MIN-MODERATE ASSISTANCE  8. Patient will instruct caregiver how to don/doff L LE AFO with independence within 7 treatment days. 9. Patient will tolerate out of bed mobility 4 hours daily to address dynamic sitting balance, promote anterior pelvic tilt, and trunk/core strengthening within 7 treatment days.         PHYSICAL THERAPY: Daily Note and AM 6/9/2020  INPATIENT: PT Visit Days : 4  Payor: [id number] / Plan: [address] [place] [address] Avenue / Product Type: Medicaid /       NAME/AGE/GENDER: Jaxon Islas is a 53 y.o. male   PRIMARY DIAGNOSIS: Acute ischemic stroke ([address] [I63.9]  Cerebrovascular accident (CVA) due to embolism ([address] [S84.6] Acute embolic stroke ([address] Acute embolic stroke ([address]       ICD-10: Treatment Diagnosis:   · Difficulty in walking, Not elsewhere classified (R26.2)  · Hemiplegia and hemiparesis following cerebral infarction affecting left non-dominant side (Y25.620)   Precaution/Allergies:  Patient has no known allergies. ASSESSMENT:     Mr. Henriquez is a 53year old admitted R MCA CVA with left hemiparesis. Patient was supine upon contact and agreeable to PT. Patient continues to have difficulty with bed mobility needing min-mod assist, additional time, and cues for improved technique. Patient states his left thigh is hurting him. Therapist donned socks, brace, and shoes with total assist. Patient then transfers to standing with min assist and cues for hand placement/technique. Patient experienced 1 major LOB due to left thigh pain which needed PT intervention to prevent fall. If therapist was not holding gait belt patient would have likely fallen due to cramp in left thigh. Patient then ambulates 450' with use of luke walker, wheelchair follow, and cues for gait sequencing. Patient returns to EOB and needed min assist for sit to supine. RN aware of pain in left thigh. Overall slow progress towards physical therapy goals. Patient's goals listed above are still appropriate. Will continue skilled PT to address remaining deficits. This section established at most recent assessment   PROBLEM LIST (Impairments causing functional limitations):  1. Decreased Strength  2. Decreased ADL/Functional Activities  3. Decreased Transfer Abilities  4. Decreased Ambulation Ability/Technique  5. Decreased Balance  6. Increased Pain  7. Decreased Activity Tolerance  8. Increased Fatigue  9. Decreased Flexibility/Joint Mobility   INTERVENTIONS PLANNED: (Benefits and precautions of physical therapy have been discussed with the patient.)  1. Balance Exercise  2. Bed Mobility  3. Family Education  4. Gait Training  5. Home Exercise Program (HEP)  6. Manual Therapy  7.  Neuromuscular Re-education/Strengthening  8. Range of Motion (ROM)  9. Therapeutic Activites  10. Therapeutic Exercise/Strengthening  11. Transfer Training     TREATMENT PLAN: Frequency/Duration: 3 times a week for duration of hospital stay  Rehabilitation Potential For Stated Goals: Good     REHAB RECOMMENDATIONS (at time of discharge pending progress):    Placement: It is my opinion, based on this patient's performance to date, that Mr. Kathleen Jensen may benefit from intensive therapy at an [address] [place] [address] after discharge due to a probable need for close medical supervision by a rehab physician, a probable need for multiple therapy disciplines and potential to make ongoing and sustainable functional improvement that is of practical value. .  Equipment:    TBD pending progress with therapy. HISTORY:   History of Present Injury/Illness (Reason for Referral):  Per H&P: \"Pt is a 52 y/o smoker with DM, HTN, who presented to ER with L leg and arm weakness, L facial droop, dysarthria. First noted L leg weakness late night 12/11 when he woke up to go to the bathroom. He was normal when he went to bed around 1030. Woke up this morning and had persistent weakness L leg and also now noted in L arm. EMS called. Noted to have slurred speech and L facial droop as well. Code S called in ER around 9am.  MRI with acute infarcts in L cerebellar hemisphere, deep frontoparietal white matter, and R paramedian yariel. Also noted old lacunar infarcts. No large vessel occlusion on CTA, but some stenosis noted. No hx afib, TIA, CVA. No CP, palpitations, SOB. \"  Past Medical History/Comorbidities:   Mr. Kathleen Jensen  has a past medical history of Acute ischemic stroke ([address] (12/12/2019), Acute pancreatitis (11/19/2014), Cerebrovascular accident (CVA) due to embolism ([address] (12/12/2019), Diabetes ([address] (2002), Diabetes ([address] Diabetes mellitus, and Hypertension.  He also has no past medical history of Arthritis, Asthma, Autoimmune disease ([facility] CAD (coronary artery disease), Cancer ([facility] Chronic kidney disease, COPD, Dementia, Dementia ([facility] Heart failure ([facility] Ill-defined condition, Infectious disease, Liver disease, Other ill-defined conditions(799.89), Psychiatric disorder, PUD (peptic ulcer disease), Seizures ([facility] or Sleep disorder. Mr. Anoop Myers  has a past surgical history that includes hx hernia repair and hx orthopaedic. Social History/Living Environment:   Home Environment: Apartment  # Steps to Enter: 12  Rails to Enter: Yes  [employer] : Bilateral  One/Two Story Residence: One story  Living Alone: No  Support Systems: Spouse/Significant Other/Partner  Patient Expects to be Discharged to[de-identified] Unknown  Current DME Used/Available at Home: None  Tub or Shower Type: Tub/Shower combination  Prior Level of Function/Work/Activity:  Independent, lives with wife in 2nd story 1 level apartment. No recent falls. Number of Personal Factors/Comorbidities that affect the Plan of Care: 1-2: MODERATE COMPLEXITY   EXAMINATION:   Most Recent Physical Functioning:   Gross Assessment: left hip grossly 2/5 to 3-/5                       Balance:  Sitting: Impaired  Sitting - Static: Good (unsupported)  Sitting - Dynamic: Fair (occasional)  Standing: Impaired  Standing - Static: Good  Standing - Dynamic : Fair Bed Mobility:  Supine to Sit: Minimum assistance; Moderate assistance  Wheelchair Mobility: NT     Transfers:  Sit to Stand: Minimum assistance  Stand to Sit: Contact guard assistance  Gait:     Base of Support: Center of gravity altered;Shift to right  Speed/Clotilde: Slow  Step Length: Left shortened;Right shortened  Gait Abnormalities: Hemiplegic;Trunk sway increased  Distance (ft): 450 Feet (ft)  Assistive Device: Walker luke  Ambulation - Level of Assistance: Contact guard assistance  Interventions: Safety awareness training;Verbal cues      Body Structures Involved:  1. Nerves  2. Voice/Speech  3. Bones  4. Joints  5.  Muscles Body Functions Affected:  1. Mental  2. Sensory/Pain  3. Neuromusculoskeletal  4. Movement Related Activities and Participation Affected:  1. General Tasks and Demands  2. Mobility  3. Self Care  4. Interpersonal Interactions and Relationships   Number of elements that affect the Plan of Care: 4+: HIGH COMPLEXITY   CLINICAL PRESENTATION:   Presentation: Evolving clinical presentation with changing clinical characteristics: MODERATE COMPLEXITY   CLINICAL DECISION MAKIN [facility] Inpatient Short Form  How much difficulty does the patient currently have. .. Unable A Lot A Little None   1. Turning over in bed (including adjusting bedclothes, sheets and blankets)? [] 1   [] 2   [] 3   [x] 4   2. Sitting down on and standing up from a chair with arms ( e.g., wheelchair, bedside commode, etc.)   [] 1   [] 2   [x] 3   [] 4   3. Moving from lying on back to sitting on the side of the bed? [] 1   [] 2   [] 3   [x] 4   How much help from another person does the patient currently need. .. Total A Lot A Little None   4. Moving to and from a bed to a chair (including a wheelchair)? [] 1   [] 2   [x] 3   [] 4   5. Need to walk in hospital room? [] 1   [] 2   [x] 3   [] 4   6. Climbing 3-5 steps with a railing? [] 1   [x] 2   [] 3   [] 4   © , Trustees of [facility] [employer], under license to [employer]. All rights reserved      Score:  Initial: 13 Most Recent: 19 (Date: 2020)    Interpretation of Tool:  Represents activities that are increasingly more difficult (i.e. Bed mobility, Transfers, Gait). Medical Necessity:     · Patient is expected to demonstrate progress in   · strength, range of motion, balance, coordination, and functional technique  ·  to   · increase independence with all mobility.    · .  Reason for Services/Other Comments:  · Patient continues to require skilled intervention due to   · medical complications and mobility deficits which impact his level of function, safety, and independence as indicated above. · .   Use of outcome tool(s) and clinical judgement create a POC that gives a: Questionable prediction of patient's progress: MODERATE COMPLEXITY        TREATMENT:      Pre-treatment Symptoms/Complaints: see above  Pain: Initial: 0/10 Post Session:  0/10       Therapeutic Activity: (    30 Minutes): Therapeutic activities including bed mobility training, transfer training, ambulation on level ground, static/dynamic sitting and standing balance, instruction in sequencing with luke walker and gait mechanics, and patient education to improve mobility, strength and balance. Required moderate Safety awareness training;Verbal cues to promote static and dynamic balance in standing, promote coordination of bilateral, lower extremity(s) and promote motor control of bilateral, lower extremity(s). Braces/Orthotics/Lines/Etc:   · L LE AFO  Treatment/Session Assessment:    · Response to Treatment:  See above  · Interdisciplinary Collaboration:   o Physical Therapy Assistant  o Registered Nurse  o Rehabilitation Attendant  · After treatment position/precautions:   o Supine in bed  o Bed alarm/tab alert on  o Bed/Chair-wheels locked  o Bed in low position  o Call light within reach  o RN notified   · Compliance with Program/Exercises: Compliant all of the time  · Recommendations/Intent for next treatment session: \"Next visit will focus on advancements to more challenging activities and reduction in assistance provided\".     Total Treatment Duration:  PT Patient Time In/Time Out  Time In: 1305  Time Out: 178 Jessica Mg PTA

## 2020-06-09 NOTE — PROGRESS NOTES
Problem: Pressure Injury - Risk of  Goal: *Prevention of pressure injury  Description: Document Dewey Scale and appropriate interventions in the flowsheet. Outcome: Progressing Towards Goal  Note: Pressure Injury Interventions:  Sensory Interventions: Assess changes in LOC    Moisture Interventions: Apply protective barrier, creams and emollients, Absorbent underpads, Check for incontinence Q2 hours and as needed, Limit adult briefs, Maintain skin hydration (lotion/cream), Minimize layers, Moisture barrier, Offer toileting Q_hr    Activity Interventions: Increase time out of bed, Pressure redistribution bed/mattress(bed type), PT/OT evaluation    Mobility Interventions: HOB 30 degrees or less, Pressure redistribution bed/mattress (bed type), PT/OT evaluation, Turn and reposition approx. every two hours(pillow and wedges)    Nutrition Interventions: Document food/fluid/supplement intake    Friction and Shear Interventions: HOB 30 degrees or less, Apply protective barrier, creams and emollients                Problem: Patient Education: Go to Patient Education Activity  Goal: Patient/Family Education  Outcome: Progressing Towards Goal     Problem: Falls - Risk of  Goal: *Absence of Falls  Description: Document Jeanne Fall Risk and appropriate interventions in the flowsheet.   Outcome: Progressing Towards Goal  Note: Fall Risk Interventions:  Mobility Interventions: Bed/chair exit alarm, Communicate number of staff needed for ambulation/transfer, Patient to call before getting OOB, Utilize walker, cane, or other assistive device    Mentation Interventions: Door open when patient unattended, More frequent rounding, Toileting rounds    Medication Interventions: Bed/chair exit alarm, Patient to call before getting OOB, Teach patient to arise slowly    Elimination Interventions: Bed/chair exit alarm, Call light in reach, Patient to call for help with toileting needs, Toilet paper/wipes in reach, Toileting schedule/hourly rounds    History of Falls Interventions: Bed/chair exit alarm, Door open when patient unattended         Problem: Patient Education: Go to Patient Education Activity  Goal: Patient/Family Education  Outcome: Progressing Towards Goal     Problem: Diabetes Self-Management  Goal: *Disease process and treatment process  Description: Define diabetes and identify own type of diabetes; list 3 options for treating diabetes. Outcome: Progressing Towards Goal  Goal: *Incorporating nutritional management into lifestyle  Description: Describe effect of type, amount and timing of food on blood glucose; list 3 methods for planning meals. Outcome: Progressing Towards Goal  Goal: *Incorporating physical activity into lifestyle  Description: State effect of exercise on blood glucose levels. Outcome: Progressing Towards Goal  Goal: *Developing strategies to promote health/change behavior  Description: Define the ABC's of diabetes; identify appropriate screenings, schedule and personal plan for screenings. Outcome: Progressing Towards Goal  Goal: *Using medications safely  Description: State effect of diabetes medications on diabetes; name diabetes medication taking, action and side effects. Outcome: Progressing Towards Goal  Goal: *Monitoring blood glucose, interpreting and using results  Description: Identify recommended blood glucose targets  and personal targets. Outcome: Progressing Towards Goal  Goal: *Prevention, detection, treatment of acute complications  Description: List symptoms of hyper- and hypoglycemia; describe how to treat low blood sugar and actions for lowering  high blood glucose level. Outcome: Progressing Towards Goal  Goal: *Prevention, detection and treatment of chronic complications  Description: Define the natural course of diabetes and describe the relationship of blood glucose levels to long term complications of diabetes.   Outcome: Progressing Towards Goal  Goal: *Developing strategies to address psychosocial issues  Description: Describe feelings about living with diabetes; identify support needed and support network  Outcome: Progressing Towards Goal     Problem: Patient Education: Go to Patient Education Activity  Goal: Patient/Family Education  Outcome: Progressing Towards Goal     Problem: Nutrition Deficit  Goal: *Optimize nutritional status  Outcome: Progressing Towards Goal     Problem: General Medical Care Plan  Goal: *Vital signs within specified parameters  Outcome: Progressing Towards Goal  Goal: *Labs within defined limits  Outcome: Progressing Towards Goal  Goal: *Absence of infection signs and symptoms  Description: Wash hand more often   Outcome: Progressing Towards Goal  Goal: *Optimal pain control at patient's stated goal  Outcome: Progressing Towards Goal  Goal: *Skin integrity maintained  Outcome: Progressing Towards Goal  Goal: *Fluid volume balance  Outcome: Progressing Towards Goal  Goal: *Optimize nutritional status  Outcome: Progressing Towards Goal  Goal: *Anxiety reduced or absent  Outcome: Progressing Towards Goal  Goal: *Progressive mobility and function (eg: ADL's)  Outcome: Progressing Towards Goal     Problem: Patient Education: Go to Patient Education Activity  Goal: Patient/Family Education  Outcome: Progressing Towards Goal     Problem: Patient Education: Go to Patient Education Activity  Goal: Patient/Family Education  Outcome: Progressing Towards Goal     Problem: Patient Education: Go to Patient Education Activity  Goal: Patient/Family Education  Outcome: Progressing Towards Goal     Problem: Patient Education: Go to Patient Education Activity  Goal: Patient/Family Education  Outcome: Progressing Towards Goal     Problem: Pain  Goal: *Control of Pain  Outcome: Progressing Towards Goal     Problem: Patient Education: Go to Patient Education Activity  Goal: Patient/Family Education  Outcome: Progressing Towards Goal

## 2020-06-10 VITALS
HEIGHT: 66 IN | RESPIRATION RATE: 20 BRPM | TEMPERATURE: 97.7 F | DIASTOLIC BLOOD PRESSURE: 75 MMHG | SYSTOLIC BLOOD PRESSURE: 116 MMHG | BODY MASS INDEX: 28.27 KG/M2 | OXYGEN SATURATION: 97 % | HEART RATE: 71 BPM | WEIGHT: 175.9 LBS

## 2020-06-10 LAB
ANION GAP SERPL CALC-SCNC: 10 MMOL/L (ref 7–16)
BASOPHILS # BLD: 0 K/UL (ref 0–0.2)
BASOPHILS NFR BLD: 1 % (ref 0–2)
BUN SERPL-MCNC: 23 MG/DL (ref 6–23)
CALCIUM SERPL-MCNC: 9.3 MG/DL (ref 8.3–10.4)
CHLORIDE SERPL-SCNC: 107 MMOL/L (ref 98–107)
CO2 SERPL-SCNC: 24 MMOL/L (ref 21–32)
CREAT SERPL-MCNC: 1.13 MG/DL (ref 0.8–1.5)
DIFFERENTIAL METHOD BLD: ABNORMAL
EOSINOPHIL # BLD: 0.1 K/UL (ref 0–0.8)
EOSINOPHIL NFR BLD: 2 % (ref 0.5–7.8)
ERYTHROCYTE [DISTWIDTH] IN BLOOD BY AUTOMATED COUNT: 15.2 % (ref 11.9–14.6)
GLUCOSE SERPL-MCNC: 81 MG/DL (ref 65–100)
HCT VFR BLD AUTO: 34.8 % (ref 41.1–50.3)
HGB BLD-MCNC: 11.1 G/DL (ref 13.6–17.2)
IMM GRANULOCYTES # BLD AUTO: 0 K/UL (ref 0–0.5)
IMM GRANULOCYTES NFR BLD AUTO: 0 % (ref 0–5)
LYMPHOCYTES # BLD: 2.6 K/UL (ref 0.5–4.6)
LYMPHOCYTES NFR BLD: 54 % (ref 13–44)
MCH RBC QN AUTO: 27.2 PG (ref 26.1–32.9)
MCHC RBC AUTO-ENTMCNC: 31.9 G/DL (ref 31.4–35)
MCV RBC AUTO: 85.3 FL (ref 79.6–97.8)
MONOCYTES # BLD: 0.3 K/UL (ref 0.1–1.3)
MONOCYTES NFR BLD: 7 % (ref 4–12)
NEUTS SEG # BLD: 1.8 K/UL (ref 1.7–8.2)
NEUTS SEG NFR BLD: 37 % (ref 43–78)
NRBC # BLD: 0 K/UL (ref 0–0.2)
PLATELET # BLD AUTO: 173 K/UL (ref 150–450)
PMV BLD AUTO: 11.7 FL (ref 9.4–12.3)
POTASSIUM SERPL-SCNC: 4.2 MMOL/L (ref 3.5–5.1)
RBC # BLD AUTO: 4.08 M/UL (ref 4.23–5.6)
SODIUM SERPL-SCNC: 141 MMOL/L (ref 136–145)
WBC # BLD AUTO: 4.9 K/UL (ref 4.3–11.1)

## 2020-06-10 PROCEDURE — 85025 COMPLETE CBC W/AUTO DIFF WBC: CPT

## 2020-06-10 PROCEDURE — 74011250637 HC RX REV CODE- 250/637: Performed by: NURSE PRACTITIONER

## 2020-06-10 PROCEDURE — 74011250636 HC RX REV CODE- 250/636: Performed by: INTERNAL MEDICINE

## 2020-06-10 PROCEDURE — 74011250637 HC RX REV CODE- 250/637: Performed by: INTERNAL MEDICINE

## 2020-06-10 PROCEDURE — 36415 COLL VENOUS BLD VENIPUNCTURE: CPT

## 2020-06-10 PROCEDURE — 74011250637 HC RX REV CODE- 250/637: Performed by: HOSPITALIST

## 2020-06-10 PROCEDURE — 80048 BASIC METABOLIC PNL TOTAL CA: CPT

## 2020-06-10 RX ORDER — METFORMIN HYDROCHLORIDE 500 MG/1
500 TABLET ORAL 2 TIMES DAILY WITH MEALS
Qty: 60 TAB | Refills: 0 | Status: SHIPPED | OUTPATIENT
Start: 2020-06-10 | End: 2020-07-10

## 2020-06-10 RX ORDER — LANOLIN ALCOHOL/MO/W.PET/CERES
400 CREAM (GRAM) TOPICAL 2 TIMES DAILY
Qty: 60 TAB | Refills: 0 | Status: SHIPPED | OUTPATIENT
Start: 2020-06-10 | End: 2020-07-10

## 2020-06-10 RX ORDER — ATORVASTATIN CALCIUM 80 MG/1
80 TABLET, FILM COATED ORAL
Qty: 30 TAB | Refills: 0 | Status: SHIPPED | OUTPATIENT
Start: 2020-06-10

## 2020-06-10 RX ORDER — METOPROLOL SUCCINATE 25 MG/1
25 TABLET, EXTENDED RELEASE ORAL DAILY
Qty: 30 TAB | Refills: 0 | Status: SHIPPED | OUTPATIENT
Start: 2020-06-11 | End: 2020-07-11

## 2020-06-10 RX ORDER — GUAIFENESIN 100 MG/5ML
81 LIQUID (ML) ORAL DAILY
Qty: 30 TAB | Refills: 0 | Status: SHIPPED | OUTPATIENT
Start: 2020-06-11 | End: 2020-07-11

## 2020-06-10 RX ADMIN — METFORMIN HYDROCHLORIDE 500 MG: 500 TABLET ORAL at 08:58

## 2020-06-10 RX ADMIN — METOPROLOL SUCCINATE 25 MG: 25 TABLET, FILM COATED, EXTENDED RELEASE ORAL at 08:58

## 2020-06-10 RX ADMIN — Medication 400 MG: at 08:58

## 2020-06-10 RX ADMIN — GUAIFENESIN 100 MG: 100 SOLUTION ORAL at 14:10

## 2020-06-10 RX ADMIN — ASPIRIN 81 MG 81 MG: 81 TABLET ORAL at 08:58

## 2020-06-10 RX ADMIN — LISINOPRIL 40 MG: 20 TABLET ORAL at 08:58

## 2020-06-10 RX ADMIN — ACETAMINOPHEN 650 MG: 325 TABLET, FILM COATED ORAL at 14:09

## 2020-06-10 RX ADMIN — ENOXAPARIN SODIUM 40 MG: 40 INJECTION SUBCUTANEOUS at 14:09

## 2020-06-10 RX ADMIN — ACETAMINOPHEN 650 MG: 325 TABLET, FILM COATED ORAL at 05:56

## 2020-06-10 RX ADMIN — DIPHENHYDRAMINE HYDROCHLORIDE 25 MG: 25 CAPSULE ORAL at 14:10

## 2020-06-10 NOTE — PROGRESS NOTES
Medicaid LOC received. Copy placed in chart. Pt was medically cleared for discharge today and transferred to [facility]  for SNF placement. Transport scheduled through [facility] for 1430. VM message left with pt's spouse notifying her of pt's dc and transport time. Care Management Interventions  PCP Verified by CM:  Yes  Mode of Transport at Discharge: BLS([facility])  Hospital Transport Time of Discharge: [address] (CM Consult): SNF  Partner SNF: Yes  Physical Therapy Consult: Yes  Occupational Therapy Consult: Yes  Speech Therapy Consult: Yes  Current Support Network: Own Home, Lives with Spouse  Confirm Follow Up Transport: Cab  The Plan for Transition of Care is Related to the Following Treatment Goals : Subacute rehab to long term care in a SNF  Discharge Location  Discharge Placement: Skilled nursing facility([facility])

## 2020-06-10 NOTE — DISCHARGE SUMMARY
Discharge Summary   Patient ID:  Jenine Oppenheim  [id number]  28 y.o.  1975  Admit date: 12/12/2019  9:07 AM  Discharge date and time: 6/10/2020  Attending: Sheri Werner MD  PCP:  Rashawn Ferguson MD  Treatment Team: Attending Provider: Sheri Werner MD; Care Manager: Valiant Essex, LM; Utilization Review: Miroslava Wood RN; Nurse Practitioner: Joey Calle NP; Hospitalist: Hai Barone NP; Utilization Review: Marjorie Shahid RN; Nurse Practitioner: Michelle Ramsey NP; Primary Nurse: Aileen Berry RN; Charge Nurse: Patito Walters  Principal Diagnosis Acute embolic stroke [facility])   Principal Problem:    Acute embolic stroke ([facility] 75.) (40/62/1624)    Active Problems:    Hypertension ()      Type 2 diabetes mellitus ([facility] ()      Arrhythmia (12/15/2019)      Hyperlipemia (12/15/2019)      PFO (patent foramen ovale) (12/17/2019)      Hypomagnesemia (3/7/2020)       * Admission Diagnoses: Acute ischemic stroke [facility]) [I63.9]  Cerebrovascular accident (CVA) due to embolism ([facility] [I63.9]  * Discharge Diagnoses:    Hospital Problems as of 6/10/2020 Date Reviewed: 3/3/2017          Codes Class Noted - Resolved POA    Hypomagnesemia ICD-10-CM: C11.97  ICD-9-CM: 275.2  3/7/2020 - Present Unknown        PFO (patent foramen ovale) ICD-10-CM: Q21.1  ICD-9-CM: 745.5  12/17/2019 - Present Yes        Arrhythmia ICD-10-CM: I49.9  ICD-9-CM: 427.9  12/15/2019 - Present Yes        Hyperlipemia ICD-10-CM: E78.5  ICD-9-CM: 272.4  12/15/2019 - Present Yes        * (Principal) Acute embolic stroke [facility]) [id number]: I63.9  ICD-9-CM: 434.11  12/12/2019 - Present Yes        Hypertension (Chronic) ICD-10-CM: I10  ICD-9-CM: 401.9  Unknown - Present Yes        Type 2 diabetes mellitus ([facility] (Chronic) ICD-10-CM: E11.9  ICD-9-CM: 250.00  Unknown - Present Yes                Hospital Course:  Mr. Osmar Davey is a 54 yo male with PMH of DM, HTN who presented with c/o left sided weakness and left facial droop 12/12/19.   CT head showed age indeterminate infarctions within the left corona radiata/caudate.  CTA head/neck showed stenosis at the distal segment of the right vertebral artery and multifocal stenosis in the P2 segment of the left posterior cerebral artery. MRI brain showed acute to early subacute infarcts in the left cerebellar hemisphere, in the periventricular deep left frontoparietal white matter and likely additional areas of restricted diffusion in the right paramedian yariel.   Echo +PFO.  On statin, ASA.  Has been difficult to place in STR. Plans for 4 week event monitor on DC and if no arrhythmia PFO closure recommended. Pt remains stable. Denies complaints. Progressing well with PT. Diagnostic Study/Procedure results summary copied from within [facility] EMR:    CT head without contrast     History: code s. Hypertension, left-sided facial droop and leg weakness     Technique: 5mm axial images were obtained from the skull base to the vertex  without intravenous contrast.  Radiation dose reduction techniques were used for  this study:  Our CT scanners use one or all of the following: Automated exposure  control, adjustment of the mA and/or kVp according to patient's size, iterative  reconstruction.     Comparison: None     Findings: The ventricles and sulci are normal in size and configuration. There  are no extra-axial fluid collections. There is no evidence to suggest an acute  major territorial infarct. Patchy areas of decreased attenuation are present  within the supratentorial white matter most compatible with mild chronic small  vessel ischemic changes. Remote lacunar infarctions are present within the left  internal capsule and left parietal lobe subcortical white matter. Age  indeterminate infarctions are present within the left corona radiata/caudate  body. There is a small remote right thalamic lacunar infarction.  There is no  convincing evidence of acute intraparenchymal hemorrhage or mass effect. Curvilinear region of increased density in the region of the left thalamus is  felt to represent partial volume averaging from calcified choroid plexus rather  than hemorrhage. The bony calvarium is intact. The visualized mastoid air cells  and paranasal sinuses are well pneumatized and aerated. Partially calcified left  scalp mass near the vertex of the most compatible with a sebaceous cyst.     IMPRESSION  Impression:  1. Age indeterminate infarctions within the left corona radiata/caudate. 2. Mild chronic small vessel ischemic changes and areas of remote infarction as  described. 3. Probable partial volume averaging the region of the left thalamus rather than  intraparenchymal hemorrhage.     Results discussed with Dr. Billy Richardson at the time of dictation at 9:20 AM on  12/12/2019. History: Code stroke.     FINDINGS:     CT angiography was performed of the neck and head with contrast and  three-dimensional CT angiography reconstruction and reformat was performed. NASCET criteria as needed. CT dose reduction was achieved through use of a  standardized protocol tailored for this examination and automatic exposure  control for dose modulation.      Mild atherosclerosis at the carotid bulbs bilaterally. There is stenosis at the  distal intracranial segment of the right vertebral artery. The basilar artery is  patent. The anterior cerebral arteries are patent bilaterally. There is  multifocal stenosis in the P2 segment of the left posterior cerebral artery. The  dural venous sinuses are patent.     IMPRESSION  IMPRESSION:     Stenosis at the distal segment of the right vertebral artery and multifocal  stenosis in the P2 segment of the left posterior cerebral artery.       EXAM:  XR HIPS BI W PELV 3 OR 4 VWS     INDICATION:   pt fell from chair, cannot remember which side he hit     COMPARISON: None.     FINDINGS: An AP view of the pelvis and a frogleg lateral view of the bilateral  hip demonstrate no fracture, dislocation or other abnormality.       IMPRESSION  IMPRESSION:  Normal bilateral hip.         MODIFIED BARIUM SWALLOW ESOPHAGRAM 1/8/2020     HISTORY: Stroke. Dysphasia.     TECHNIQUE: 1.5 minutes of fluoroscopy time was utilized. 0 spot fluoroscopic  images were saved. The patient ingested various consistencies of barium under  direct fluoroscopic observation.     FINDINGS: There was aspiration of a small quantity of thin liquids during straw  sips. The patient ingested liquid consistencies of barium without difficulty.     IMPRESSION  IMPRESSION: Small quantity aspiration with straw sips of thin liquids.       MRI BRAIN WITHOUT CONTRAST 12/12/2019     HISTORY: 53year-old with hypertension and acute neurological deficit on waking.     TECHNIQUE: Sagittal and axial T1-weighted, axial T2-weighted, axial and coronal  FLAIR, axial T2-weighted gradient-echo, axial diffusion weighted images with ADC  maps of the brain.     COMPARISON: Head CT December 12, 2019     FINDINGS: On the diffusion weighted sequence there is a focus of restricted  diffusion in the deep left periventricular white matter and corpus callosum  posteriorly with associated hyperintense FLAIR signal. There is a focus first of  restricted diffusion in the left cerebellar hemisphere posteriorly. There is  variable restricted diffusion within the right paramedian yariel. There is  variable hyperintense FLAIR signal within the yariel that may be associated with  older ischemic events.     Old infarcts are present in the thalami, left basal ganglia and left corona  radiata.     There is no acute intracranial hemorrhage. Specifically there is no thalamic  hemorrhage. On the T2-weighted and FLAIR sequences, there are white matter  hyperintensities compatible with chronic small vessel ischemic disease.  A  subcutaneous nodule in the left frontoparietal scalp is likely a sebaceous cyst.     There is no hydrocephalus, intra-axial mass or extra-axial hematoma.     IMPRESSION  IMPRESSION:     1. Acute to early subacute infarcts in the left cerebellar hemisphere, in the  periventricular deep left frontoparietal white matter and likely additional  areas of restricted diffusion in the right paramedian yariel.     2. Old lacunar infarcts in the corpus striatum and periventricular white matter  as well as within the left corona radiata. Bilateral lower extremity venous ultrasound     INDICATION:  Pain and swelling,     Doppler ultrasound of both lower extremities was performed.     FINDINGS:  There is normal flow in the greater saphenous, common femoral,  superficial femoral, and popliteal veins. Normal compression and augmentation is  demonstrated. The proximal calf veins are also patent.     IMPRESSION  IMPRESSION: No evidence of deep venous thrombosis in either lower extremity        [address] [facility], [address] [facility]  [phone]     Transesophageal Echocardiogram  2D, Doppler, and Color Doppler     Patient: Dino Bonilla  MR #: [id number]  : 1975  Age: 53 years  Gender: Male  Study date: 16-Dec-2019  Account #: [de-identified]  Height: 66 in  Weight: 210 lb  BSA: 2.04 mï¾²  Status:Routine  Location: [facility] 8  BP: 142/ 82     Allergies: NO KNOWN ALLERGENS     Reading Physician:  Gary Ching. MD [place] [address]:  [facility]  Group:  [facility]  Referring Physician:  Gary Larios MD [facility]     INDICATIONS: PFO     PROCEDURE: This was a routine study. The risks and alternatives of the  procedure were explained to the patient and informed consent was obtained. A  transesophageal echocardiogram was performed. The study included complete 2D  imaging, complete spectral Doppler, and color Doppler.  An adult multiplane  probe was inserted by the attending cardiologist. The transesophageal  transducer was easily advanced and appropriate images were obtained without  complications. Intravenous contrast (agitated saline, 9 ml) was administered   to  evaluate possible R-L intracardiac shunting. Image quality was excellent. There  were no complications during the procedure. MEDICATIONS: Benzocaine spray,  topical to oropharynx, prior to procedure. Viscous lidocaine, applied to  oropharynx, prior to procedure. Midazolam, 1 mg. Fentanyl, 75 mcg.     LEFT VENTRICLE: Size was normal. Systolic function was normal. Ejection  fraction was estimated in the range of 60 % to 65 %. There were no regional  wall motion abnormalities. Wall thickness was normal. Concentric hypertrophy  was present.     RIGHT VENTRICLE: The size was normal. Systolic function was normal.     LEFT ATRIUM: Size was normal. APPENDAGE: No thrombus was identified.     ATRIAL SEPTUM: PFO present with 3 mm seperation of septum Primum and   Secundum. Large shunt with noted with bubble study > 30 bubbles in left atrium. There   was  a patent foramen ovale.     RIGHT ATRIUM: Size was normal. No thrombus was identified.     SYSTEMIC VEINS: SVC: The superior vena cava size was normal. IVC: The   inferior  vena cava was normal in size.     AORTIC VALVE: The valve was structurally normal, tri-commissural. There was   no  insufficiency.     MITRAL VALVE: Valve structure was normal. There was trivial regurgitation.     TRICUSPID VALVE: The valve structure was normal. There was trivial  regurgitation.     PULMONIC VALVE: The valve structure was normal. There was no insufficiency.     AORTA: The root exhibited normal size. The ascending aorta was normal in   size. The aortic arch was normal in size. The descending aorta was normal in size. There was no evidence for dissection.     SUMMARY:     -  Left ventricle: Systolic function was normal. Ejection fraction was  estimated in the range of 60 % to 65 %. There were no regional wall motion  abnormalities.  Concentric hypertrophy was present.     -  Atrial septum: PFO present with 3 mm seperation of septum Primum and  Secundum. Large shunt with noted with bubble study > 30 bubbles in left   atrium. There was a patent foramen ovale.     Prepared and signed by  George Huffman. MD [facility]  Signed 16-Dec-2019 16:11:03          Labs: Results:       Chemistry Recent Labs     06/10/20  0455 06/09/20  0622 06/08/20  0457   GLU 81 77 76    142 142   K 4.2 4.4 4.4    110* 111*   CO2 24 24 27   BUN 23 26* 23   CREA 1.13 1.17 1.19   CA 9.3 9.5 9.3   AGAP 10 8 4*      CBC w/Diff Recent Labs     06/10/20  0455 06/09/20  0622 06/08/20  0457   WBC 4.9 5.2 5.0   RBC 4.08* 4.03* 4.05*   HGB 11.1* 10.9* 11.1*   HCT 34.8* 33.9* 34.3*    170 164   GRANS 37* 47 42*   LYMPH 54* 44 49*   EOS 2 1 2      Cardiac Enzymes No results for input(s): CPK, CKND1, JAREN in the last 72 hours. No lab exists for component: CKRMB, TROIP   Coagulation No results for input(s): PTP, INR, APTT, INREXT, INREXT in the last 72 hours. Lipid Panel @BRIEFLAB(CHOL,CHOLPOCT,590273,224002,[id number],CHOLX,CHOLP,CHLST,CHOLV,391321,HDL,HDLPOC,HDLPOCT,976148,NHDLCT,[id number],HDLC,HDLP,LDL,LDLPOCT,LDLCPOC,271412,NLDLCT,DLDL,LDLC,DLDLP,054789,VLDLC,VLDL,TGL,TGLX,TRIGL,[id number],TRIGP,TGLPOCT,418372,986290,CHHD,CHHDX)@   BNP No results for input(s): BNPP in the last 72 hours. Liver Enzymes No results for input(s): TP, ALB, TBIL, AP in the last 72 hours. No lab exists for component: SGOT, GPT, DBIL   Thyroid Studies No results found for: T4, T3U, TSH, TSHEXT, TSHEXT         Discharge Exam:  Visit Vitals  /75 (BP 1 Location: Right arm, BP Patient Position: At rest)   Pulse 71   Temp 97.7 °F (36.5 °C)   Resp 20   Ht 5' 6\" (1.676 m)   Wt 79.8 kg (175 lb 14.4 oz)   SpO2 97%   BMI 28.39 kg/m²       General:       No acute distress    Lungs:          CTA bilaterally. Resp even and nonlabored  Heart:            S1S2 present without murmurs rubs gallops. RRR.  No LE edema  Abdomen:    Soft, non tender, non distended. BS present  Extremities: No cyanosis. Moves LUE at elbow  Neurologic:  moves right hand and raises arm at elbow moderately, moves left arm at elbow mildly slurred speech.  PERRLA, strength 4/5 RUE/RLE,  2/5 LUE, LLE 1/5      Disposition: SNF  Discharge Condition: stable  Patient Instructions:   Current Discharge Medication List      START taking these medications    Details   aspirin 81 mg chewable tablet Take 1 Tab by mouth daily for 30 days. Qty: 30 Tab, Refills: 0      atorvastatin (LIPITOR) 80 mg tablet Take 1 Tab by mouth nightly. Qty: 30 Tab, Refills: 0      magnesium oxide (MAG-OX) 400 mg tablet Take 1 Tab by mouth two (2) times a day for 30 days. Qty: 60 Tab, Refills: 0      metFORMIN (GLUCOPHAGE) 500 mg tablet Take 1 Tab by mouth two (2) times daily (with meals) for 30 days. Qty: 60 Tab, Refills: 0      metoprolol succinate (TOPROL-XL) 25 mg XL tablet Take 1 Tab by mouth daily for 30 days. Qty: 30 Tab, Refills: 0         CONTINUE these medications which have NOT CHANGED    Details   lisinopril (PRINIVIL, ZESTRIL) 40 mg tablet Take 1 Tab by mouth daily.   Qty: 30 Tab, Refills: 3         STOP taking these medications       omeprazole (PRILOSEC) 20 mg capsule Comments:   Reason for Stopping:         insulin NPH/insulin regular (NOVOLIN 70/30, HUMULIN 70/30) 100 unit/mL (70-30) injection Comments:   Reason for Stopping:         Insulin Syringe-Needle U-100 1/2 mL 31 x 5/16\" syrg Comments:   Reason for Stopping:               Activity: PT/OT Eval and Treat  Diet: diabetic consistent carb, mechanical soft, glucerna all meals   Wound Care: None needed      Full Code     Pneumonia and flu vaccine to be administered at discharge per hospital protocol     Follow-up  ·   FU PCP 2-3 days  ·  FU Neurology  · FU Cardiology for event monitor, PFO closure  · DC on ASA, lipitor  · BP control long term <140/80      Notes, labs, VS, diagnostic testing reviewed  Case discussed with pt, care team,  Vitor      Time spent to discharge patient 45 min    Signed:  Ludmila Nguyen NP  6/10/2020  11:03 AM

## 2020-06-10 NOTE — ROUTINE PROCESS
TRANSFER - OUT REPORT: 
 
Verbal report given to [facility] on Dio Pavon  being transferred to [facility] [address] for routine progression of care Report consisted of patients Situation, Background, Assessment and  
Recommendations(SBAR). Information from the following report(s) SBAR, Kardex, ED Summary, Procedure Summary, Intake/Output, MAR, Accordion, Recent Results and Med Rec Status was reviewed with the receiving nurse. Lines:    
 
Opportunity for questions and clarification was provided. Patient transported with: 
Machelle Orozco

## 2020-06-10 NOTE — PROGRESS NOTES
Hospitalist Progress Note    Subjective:   Daily Progress Note: 6/9/2020 1632     See previous notes.     6/9:  Has been accepted to [facility], excited to move forward, however anxious regarding where he will live after rehab. Waiting for medicaid ok,hoping to discharge tomorrow.       Current Facility-Administered Medications   Medication Dose Route Frequency    magnesium oxide (MAG-OX) tablet 400 mg  400 mg Oral BID    metFORMIN (GLUCOPHAGE) tablet 500 mg  500 mg Oral BID WITH MEALS    acetaminophen (TYLENOL) tablet 650 mg  650 mg Oral Q4H PRN    diphenhydrAMINE (BENADRYL) capsule 25 mg  25 mg Oral Q6H PRN    acetaminophen (TYLENOL) tablet 650 mg  650 mg Oral Q8H    lip protectant (BLISTEX) ointment 1 Each  1 Each Topical PRN    metoprolol succinate (TOPROL-XL) XL tablet 25 mg  25 mg Oral DAILY    polyethylene glycol (MIRALAX) packet 17 g  17 g Oral DAILY PRN    guaiFENesin (ROBITUSSIN) 100 mg/5 mL oral liquid 100 mg  100 mg Oral Q4H PRN    hydrALAZINE (APRESOLINE) 20 mg/mL injection 20 mg  20 mg IntraVENous Q4H PRN    atorvastatin (LIPITOR) tablet 80 mg  80 mg Oral QHS    lisinopril (PRINIVIL, ZESTRIL) tablet 40 mg  40 mg Oral DAILY    sodium chloride (NS) flush 5-40 mL  5-40 mL IntraVENous PRN    ondansetron (ZOFRAN) injection 4 mg  4 mg IntraVENous Q4H PRN    aspirin chewable tablet 81 mg  81 mg Oral DAILY    enoxaparin (LOVENOX) injection 40 mg  40 mg SubCUTAneous Q24H    dextrose 40% (GLUTOSE) oral gel 1 Tube  15 g Oral PRN    glucagon (GLUCAGEN) injection 1 mg  1 mg IntraMUSCular PRN    dextrose (D50W) injection syrg 12.5-25 g  25-50 mL IntraVENous PRN      Review of Systems  A comprehensive review of systems was negative except for that written in the HPI.     Objective:     Visit Vitals  /82 (BP 1 Location: Right arm, BP Patient Position: At rest)   Pulse 76   Temp 97.8 °F (36.6 °C)   Resp 16   Ht 5' 6\" (1.676 m)   Wt 79.8 kg (175 lb 14.4 oz)   SpO2 97%   BMI 28.39 kg/m² O2 Device: Room air    Temp (24hrs), Av.8 °F (36.6 °C), Min:97.6 °F (36.4 °C), Max:97.9 °F (36.6 °C)    701 -  1900  In: 210 [P.O.:210]  Out: -   General appearance: Conversant, oriented. Denies need or new issues.  Anxious regaring discharge. Head: Normocephalic, without obvious abnormality, atraumatic  Eyes: conjunctivae/corneas clear. PERRL  Throat: Continued mild left facial weakness.  Lips, mucosa, and tongue normal. Teeth and gums normal.  Eating normally. Neck: supple, symmetrical, trachea midline, no JVD  Lungs: clear to auscultation bilaterally  Heart: regular rate and rhythm, S1, S2 normal, no murmur, click, rub or gallop  Abdomen: soft, non-tender. Bowel sounds normal. No masses,  no organomegaly  Extremities: Persistent left side upper and lower residual weakness, improved vastly since admission.  Using brace.    Skin: Skin color, texture, turgor normal. No rashes or lesions  Neurologic: As above. Additional comments: Notes,orders, test results, vitals reviewed    Data Review  Recent Results (from the past 24 hour(s))   CBC WITH AUTOMATED DIFF    Collection Time: 20  6:22 AM   Result Value Ref Range    WBC 5.2 4.3 - 11.1 K/uL    RBC 4.03 (L) 4.23 - 5.6 M/uL    HGB 10.9 (L) 13.6 - 17.2 g/dL    HCT 33.9 (L) 41.1 - 50.3 %    MCV 84.1 79.6 - 97.8 FL    MCH 27.0 26.1 - 32.9 PG    MCHC 32.2 31.4 - 35.0 g/dL    RDW 15.4 (H) 11.9 - 14.6 %    PLATELET 078 484 - 662 K/uL    MPV 11.6 9.4 - 12.3 FL    ABSOLUTE NRBC 0.00 0.0 - 0.2 K/uL    DF AUTOMATED      NEUTROPHILS 47 43 - 78 %    LYMPHOCYTES 44 13 - 44 %    MONOCYTES 6 4.0 - 12.0 %    EOSINOPHILS 1 0.5 - 7.8 %    BASOPHILS 1 0.0 - 2.0 %    IMMATURE GRANULOCYTES 0 0.0 - 5.0 %    ABS. NEUTROPHILS 2.5 1.7 - 8.2 K/UL    ABS. LYMPHOCYTES 2.3 0.5 - 4.6 K/UL    ABS. MONOCYTES 0.3 0.1 - 1.3 K/UL    ABS. EOSINOPHILS 0.1 0.0 - 0.8 K/UL    ABS. BASOPHILS 0.0 0.0 - 0.2 K/UL    ABS. IMM.  GRANS. 0.0 0.0 - 0.5 K/UL   METABOLIC PANEL, BASIC Collection Time: 20  6:22 AM   Result Value Ref Range    Sodium 142 136 - 145 mmol/L    Potassium 4.4 3.5 - 5.1 mmol/L    Chloride 110 (H) 98 - 107 mmol/L    CO2 24 21 - 32 mmol/L    Anion gap 8 7 - 16 mmol/L    Glucose 77 65 - 100 mg/dL    BUN 26 (H) 6 - 23 MG/DL    Creatinine 1.17 0.8 - 1.5 MG/DL    GFR est AA >60 >60 ml/min/1.73m2    GFR est non-AA >60 >60 ml/min/1.73m2    Calcium 9.5 8.3 - 10.4 MG/DL     MA.8     Assessment/Plan:   Acute to early subacute infarcts in the left cerebellar hemisphere, in the periventricular deep left frontoparietal white matter and likely additional areas of restricted diffusion in the right paramedian yariel.  Old lacunar infarcts also.                  Making good progress with PT/OT                Speech and swallowing improved to               XXX [id number] SLP signed off                 Now approved for [facility], waiting  for medicaid ok     Dysarthria due to stroke:  Improved immensely, able to understand all speech.               SLP releasing       Difficult placement with marital discord prior to stroke:  Wife does not want him at home:  CM working on tirelessly                 Hypertension:  Continue lisinopril, toprol XL     NIDDM II:  Continue glucophage, diabetic diet.  A1C:  8.4              DM education and management           continues to follow intermittently,   increased glucophage frequency               Discontinued SSI      Arrhythmia:  On beta blocker     PFO 2019:                Will need 4 week event monitor  on  discharge              Will need PFO closure at some  point after discharge per Cards      Hypomagnesemia:  Replace and recheck               Increasing daily 400 mg dose to bid .             monitor weekly               :  Continue bid dosing     HOPEFULLY DISCHARGE TO [address] discussed with: Patient, CM, and Nurse    Signed By: Jennifer Casper NP     2020

## 2020-06-11 ENCOUNTER — PATIENT OUTREACH (OUTPATIENT)
Dept: CASE MANAGEMENT | Age: 45
End: 2020-06-11

## 2020-06-11 NOTE — PROGRESS NOTES
Patient discharged to a SNF Preferred Provider [address] . Patient will be included in weekly care coordination calls. Message sent to Dorinda Crook RN SNF Preferred Provider [address].

## 2020-07-15 NOTE — PROGRESS NOTES
Spoke with 793 Jenifer Coto at [facility]. Advised of patient procedure on 7/16/2020, arrival time of 0730 @ [facility]. Instructed to keep patient NPO after midnight and pack overnight bag.  Will discuss history and medications in AM.

## 2020-07-16 ENCOUNTER — HOSPITAL ENCOUNTER (OUTPATIENT)
Dept: CARDIAC CATH/INVASIVE PROCEDURES | Age: 45
Discharge: NURSING, REHAB OR LONG-TERM CARE | End: 2020-07-20
Attending: INTERNAL MEDICINE | Admitting: INTERNAL MEDICINE
Payer: MEDICAID

## 2020-07-16 DIAGNOSIS — Q21.12 PFO (PATENT FORAMEN OVALE): ICD-10-CM

## 2020-07-16 DIAGNOSIS — E78.2 MIXED HYPERLIPIDEMIA: ICD-10-CM

## 2020-07-16 DIAGNOSIS — I10 ESSENTIAL HYPERTENSION: Chronic | ICD-10-CM

## 2020-07-16 DIAGNOSIS — I63.9 ACUTE EMBOLIC STROKE (HCC): ICD-10-CM

## 2020-07-16 LAB
ACT BLD: 296 SECS (ref 70–128)
ANION GAP SERPL CALC-SCNC: 4 MMOL/L (ref 7–16)
ATRIAL RATE: 60 BPM
ATRIAL RATE: 71 BPM
BUN SERPL-MCNC: 15 MG/DL (ref 6–23)
CALCIUM SERPL-MCNC: 9.4 MG/DL (ref 8.3–10.4)
CALCULATED P AXIS, ECG09: 31 DEGREES
CALCULATED P AXIS, ECG09: 47 DEGREES
CALCULATED R AXIS, ECG10: 2 DEGREES
CALCULATED R AXIS, ECG10: 4 DEGREES
CALCULATED T AXIS, ECG11: 27 DEGREES
CALCULATED T AXIS, ECG11: 51 DEGREES
CHLORIDE SERPL-SCNC: 113 MMOL/L (ref 98–107)
CO2 SERPL-SCNC: 27 MMOL/L (ref 21–32)
CREAT SERPL-MCNC: 1.09 MG/DL (ref 0.8–1.5)
DIAGNOSIS, 93000: NORMAL
DIAGNOSIS, 93000: NORMAL
ERYTHROCYTE [DISTWIDTH] IN BLOOD BY AUTOMATED COUNT: 14.6 % (ref 11.9–14.6)
GLUCOSE SERPL-MCNC: 84 MG/DL (ref 65–100)
HCT VFR BLD AUTO: 34.2 % (ref 41.1–50.3)
HGB BLD-MCNC: 11.3 G/DL (ref 13.6–17.2)
INR PPP: 0.8
MAGNESIUM SERPL-MCNC: 1.8 MG/DL (ref 1.8–2.4)
MCH RBC QN AUTO: 28 PG (ref 26.1–32.9)
MCHC RBC AUTO-ENTMCNC: 33 G/DL (ref 31.4–35)
MCV RBC AUTO: 84.9 FL (ref 79.6–97.8)
NRBC # BLD: 0 K/UL (ref 0–0.2)
P-R INTERVAL, ECG05: 156 MS
P-R INTERVAL, ECG05: 166 MS
PLATELET # BLD AUTO: 184 K/UL (ref 150–450)
PMV BLD AUTO: 11.8 FL (ref 9.4–12.3)
POTASSIUM SERPL-SCNC: 3.9 MMOL/L (ref 3.5–5.1)
PROTHROMBIN TIME: 11.7 SEC (ref 12–14.7)
Q-T INTERVAL, ECG07: 396 MS
Q-T INTERVAL, ECG07: 406 MS
QRS DURATION, ECG06: 86 MS
QRS DURATION, ECG06: 88 MS
QTC CALCULATION (BEZET), ECG08: 406 MS
QTC CALCULATION (BEZET), ECG08: 430 MS
RBC # BLD AUTO: 4.03 M/UL (ref 4.23–5.6)
SODIUM SERPL-SCNC: 144 MMOL/L (ref 136–145)
VENTRICULAR RATE, ECG03: 60 BPM
VENTRICULAR RATE, ECG03: 71 BPM
WBC # BLD AUTO: 3.8 K/UL (ref 4.3–11.1)

## 2020-07-16 PROCEDURE — 80048 BASIC METABOLIC PNL TOTAL CA: CPT

## 2020-07-16 PROCEDURE — C1894 INTRO/SHEATH, NON-LASER: HCPCS

## 2020-07-16 PROCEDURE — 85347 COAGULATION TIME ACTIVATED: CPT

## 2020-07-16 PROCEDURE — 93662 INTRACARDIAC ECG (ICE): CPT

## 2020-07-16 PROCEDURE — 99153 MOD SED SAME PHYS/QHP EA: CPT

## 2020-07-16 PROCEDURE — 77030013687 HC GD NDL BARD -B

## 2020-07-16 PROCEDURE — 77030004559 HC CATH ANGI DX SUPT CARD -B

## 2020-07-16 PROCEDURE — 85610 PROTHROMBIN TIME: CPT

## 2020-07-16 PROCEDURE — 83735 ASSAY OF MAGNESIUM: CPT

## 2020-07-16 PROCEDURE — C1817 SEPTAL DEFECT IMP SYS: HCPCS

## 2020-07-16 PROCEDURE — 85027 COMPLETE CBC AUTOMATED: CPT

## 2020-07-16 PROCEDURE — 74011250637 HC RX REV CODE- 250/637: Performed by: INTERNAL MEDICINE

## 2020-07-16 PROCEDURE — 77030040829 HC CATH EXT URINE MDII -B

## 2020-07-16 PROCEDURE — 93582 PERQ TRANSCATH CLOSURE PDA: CPT

## 2020-07-16 PROCEDURE — 99152 MOD SED SAME PHYS/QHP 5/>YRS: CPT

## 2020-07-16 PROCEDURE — 74011250636 HC RX REV CODE- 250/636: Performed by: INTERNAL MEDICINE

## 2020-07-16 PROCEDURE — C1759 CATH, INTRA ECHOCARDIOGRAPHY: HCPCS

## 2020-07-16 PROCEDURE — 93005 ELECTROCARDIOGRAM TRACING: CPT | Performed by: INTERNAL MEDICINE

## 2020-07-16 PROCEDURE — 93580 TRANSCATH CLOSURE OF ASD: CPT

## 2020-07-16 PROCEDURE — 77030002912 HC SUT ETHBND J&J -A

## 2020-07-16 PROCEDURE — C1769 GUIDE WIRE: HCPCS

## 2020-07-16 PROCEDURE — 74011000250 HC RX REV CODE- 250: Performed by: INTERNAL MEDICINE

## 2020-07-16 RX ORDER — HEPARIN SODIUM 200 [USP'U]/100ML
3 INJECTION, SOLUTION INTRAVENOUS CONTINUOUS
Status: DISCONTINUED | OUTPATIENT
Start: 2020-07-16 | End: 2020-07-16 | Stop reason: HOSPADM

## 2020-07-16 RX ORDER — CEFAZOLIN SODIUM/WATER 2 G/20 ML
2 SYRINGE (ML) INTRAVENOUS ONCE
Status: COMPLETED | OUTPATIENT
Start: 2020-07-16 | End: 2020-07-16

## 2020-07-16 RX ORDER — LISINOPRIL 20 MG/1
40 TABLET ORAL DAILY
Status: DISCONTINUED | OUTPATIENT
Start: 2020-07-17 | End: 2020-07-20 | Stop reason: HOSPADM

## 2020-07-16 RX ORDER — CLOPIDOGREL BISULFATE 75 MG/1
600 TABLET ORAL ONCE
Status: COMPLETED | OUTPATIENT
Start: 2020-07-16 | End: 2020-07-16

## 2020-07-16 RX ORDER — CLOPIDOGREL BISULFATE 75 MG/1
75 TABLET ORAL DAILY
Status: DISCONTINUED | OUTPATIENT
Start: 2020-07-17 | End: 2020-07-20 | Stop reason: HOSPADM

## 2020-07-16 RX ORDER — FENTANYL CITRATE 50 UG/ML
25-50 INJECTION, SOLUTION INTRAMUSCULAR; INTRAVENOUS
Status: DISCONTINUED | OUTPATIENT
Start: 2020-07-16 | End: 2020-07-16 | Stop reason: HOSPADM

## 2020-07-16 RX ORDER — GUAIFENESIN 100 MG/5ML
81 LIQUID (ML) ORAL DAILY
Status: DISCONTINUED | OUTPATIENT
Start: 2020-07-17 | End: 2020-07-20 | Stop reason: HOSPADM

## 2020-07-16 RX ORDER — ATORVASTATIN CALCIUM 80 MG/1
80 TABLET, FILM COATED ORAL
Status: DISCONTINUED | OUTPATIENT
Start: 2020-07-16 | End: 2020-07-20 | Stop reason: HOSPADM

## 2020-07-16 RX ORDER — SODIUM CHLORIDE 9 MG/ML
75 INJECTION, SOLUTION INTRAVENOUS CONTINUOUS
Status: DISCONTINUED | OUTPATIENT
Start: 2020-07-16 | End: 2020-07-20 | Stop reason: HOSPADM

## 2020-07-16 RX ORDER — HEPARIN SODIUM 10000 [USP'U]/ML
1000-10000 INJECTION, SOLUTION INTRAVENOUS; SUBCUTANEOUS
Status: DISCONTINUED | OUTPATIENT
Start: 2020-07-16 | End: 2020-07-16 | Stop reason: HOSPADM

## 2020-07-16 RX ORDER — LIDOCAINE HYDROCHLORIDE 10 MG/ML
1-30 INJECTION, SOLUTION EPIDURAL; INFILTRATION; INTRACAUDAL; PERINEURAL ONCE
Status: COMPLETED | OUTPATIENT
Start: 2020-07-16 | End: 2020-07-16

## 2020-07-16 RX ORDER — SODIUM CHLORIDE 9 MG/ML
75 INJECTION, SOLUTION INTRAVENOUS CONTINUOUS
Status: DISCONTINUED | OUTPATIENT
Start: 2020-07-16 | End: 2020-07-16

## 2020-07-16 RX ORDER — MAG HYDROX/ALUMINUM HYD/SIMETH 200-200-20
30 SUSPENSION, ORAL (FINAL DOSE FORM) ORAL AS NEEDED
Status: DISCONTINUED | OUTPATIENT
Start: 2020-07-16 | End: 2020-07-16 | Stop reason: HOSPADM

## 2020-07-16 RX ORDER — SODIUM CHLORIDE 0.9 % (FLUSH) 0.9 %
5-40 SYRINGE (ML) INJECTION EVERY 8 HOURS
Status: DISCONTINUED | OUTPATIENT
Start: 2020-07-16 | End: 2020-07-20 | Stop reason: HOSPADM

## 2020-07-16 RX ORDER — HYDROCODONE BITARTRATE AND ACETAMINOPHEN 5; 325 MG/1; MG/1
1 TABLET ORAL
Status: DISCONTINUED | OUTPATIENT
Start: 2020-07-16 | End: 2020-07-20 | Stop reason: HOSPADM

## 2020-07-16 RX ORDER — GUAIFENESIN 100 MG/5ML
81-324 LIQUID (ML) ORAL ONCE
Status: COMPLETED | OUTPATIENT
Start: 2020-07-16 | End: 2020-07-16

## 2020-07-16 RX ORDER — NITROGLYCERIN 0.4 MG/1
0.4 TABLET SUBLINGUAL
Status: DISCONTINUED | OUTPATIENT
Start: 2020-07-16 | End: 2020-07-20 | Stop reason: HOSPADM

## 2020-07-16 RX ORDER — MORPHINE SULFATE 2 MG/ML
2 INJECTION, SOLUTION INTRAMUSCULAR; INTRAVENOUS
Status: DISCONTINUED | OUTPATIENT
Start: 2020-07-16 | End: 2020-07-20 | Stop reason: HOSPADM

## 2020-07-16 RX ORDER — SODIUM CHLORIDE 0.9 % (FLUSH) 0.9 %
5-40 SYRINGE (ML) INJECTION AS NEEDED
Status: DISCONTINUED | OUTPATIENT
Start: 2020-07-16 | End: 2020-07-20 | Stop reason: HOSPADM

## 2020-07-16 RX ORDER — MIDAZOLAM HYDROCHLORIDE 1 MG/ML
.5-2 INJECTION, SOLUTION INTRAMUSCULAR; INTRAVENOUS
Status: DISCONTINUED | OUTPATIENT
Start: 2020-07-16 | End: 2020-07-16 | Stop reason: HOSPADM

## 2020-07-16 RX ORDER — ACETAMINOPHEN 325 MG/1
650 TABLET ORAL
Status: DISCONTINUED | OUTPATIENT
Start: 2020-07-16 | End: 2020-07-20 | Stop reason: HOSPADM

## 2020-07-16 RX ORDER — ICOSAPENT ETHYL 1000 MG/1
1 CAPSULE ORAL 2 TIMES DAILY WITH MEALS
Status: DISCONTINUED | OUTPATIENT
Start: 2020-07-16 | End: 2020-07-20 | Stop reason: HOSPADM

## 2020-07-16 RX ADMIN — HEPARIN SODIUM 10000 UNITS: 10000 INJECTION INTRAVENOUS; SUBCUTANEOUS at 10:26

## 2020-07-16 RX ADMIN — MIDAZOLAM 2 MG: 1 INJECTION INTRAMUSCULAR; INTRAVENOUS at 10:18

## 2020-07-16 RX ADMIN — ALUMINUM HYDROXIDE, MAGNESIUM HYDROXIDE, AND SIMETHICONE 30 ML: 200; 200; 20 SUSPENSION ORAL at 11:01

## 2020-07-16 RX ADMIN — ASPIRIN 324 MG: 81 TABLET, CHEWABLE ORAL at 07:52

## 2020-07-16 RX ADMIN — HEPARIN SODIUM 3 UNITS/HR: 200 INJECTION, SOLUTION INTRAVENOUS at 10:13

## 2020-07-16 RX ADMIN — Medication 10 ML: at 15:08

## 2020-07-16 RX ADMIN — CEFAZOLIN SODIUM 2 G: 100 INJECTION, POWDER, LYOPHILIZED, FOR SOLUTION INTRAVENOUS at 10:04

## 2020-07-16 RX ADMIN — ATORVASTATIN CALCIUM 80 MG: 80 TABLET, FILM COATED ORAL at 22:18

## 2020-07-16 RX ADMIN — MIDAZOLAM 1 MG: 1 INJECTION INTRAMUSCULAR; INTRAVENOUS at 10:40

## 2020-07-16 RX ADMIN — CLOPIDOGREL BISULFATE 600 MG: 75 TABLET ORAL at 11:01

## 2020-07-16 RX ADMIN — SODIUM CHLORIDE 75 ML/HR: 9 INJECTION, SOLUTION INTRAVENOUS at 15:08

## 2020-07-16 RX ADMIN — LIDOCAINE HYDROCHLORIDE 20 ML: 10 INJECTION, SOLUTION EPIDURAL; INFILTRATION; INTRACAUDAL; PERINEURAL at 10:21

## 2020-07-16 RX ADMIN — HYDROCODONE BITARTRATE AND ACETAMINOPHEN 1 TABLET: 5; 325 TABLET ORAL at 18:14

## 2020-07-16 RX ADMIN — FENTANYL CITRATE 50 MCG: 50 INJECTION, SOLUTION INTRAMUSCULAR; INTRAVENOUS at 10:18

## 2020-07-16 RX ADMIN — Medication 10 ML: at 22:13

## 2020-07-16 RX ADMIN — HYDROCODONE BITARTRATE AND ACETAMINOPHEN 1 TABLET: 5; 325 TABLET ORAL at 22:18

## 2020-07-16 NOTE — OP NOTES
[facility] REPORT    Patient: Bud Briggs MRN: [id number]  SSN: [id number]    YOB: 1975  Age: 53 y.o. Sex: male      DATE OF PROCEDURE: 7/16/2020     PROCEDURE: PERCUTANEOUS CLOSURE OF PATENT FORAMEN OVALE (PFO)    INDICATION:   The patient is a 53y.o. year old patient with documented cryptogenic stroke felt likely due to paradoxical emboli from right to left intracardiac shunt across PFO. Thorough evaluation pre-procedure without alternative etiology established. Closure felt indicated based on available data reviewed by both Cardiology and Neurology. This was discussed with patient and after a thorough discussion of all the risks and proposed benefits, the patient agreed to proceed. TYPE OF ANESTHESIA:  MODERATE SEDATION. Total of 2 mg of Versed and 50 mcg of Fentanyl was administered by Edie Tejada RN under my supervision. Sedation start time was 1020 with procedure completion time of 1055. PROCEDURAL DETAILS: After informed consent, patient was brought to the [facility] catheterization lab. Time-out was performed. Patient was prepped and draped in standard, meticulous surgical fashion with exposure of right and left femoral areas. Utilizing a Site-rite Ultrasound, the right and left common femoral veins were identified. A static image was placed on the chart. Venous puncture was performed under direct ultrasound guidance, a 6-Belarusian sheath was placed in the right common femoral vein and a long 9 Amharic sheath was placed in left common femoral vein. At this point, patient was given IV heparin with monitoring of ACT to maintain ACT > 300. An AcuNav intracardiac echocardiographic catheter was advanced via the left femoral vein into the right atrium. A complete evaluation of the entire atrial septum was performed. This showed a patent foramen ovale without any other septal defects identified.    At this point, a 6-Belarusian multipurpose catheter was advanced via the right common femoral vein into the right atrium. Utilizing both fluoroscopy and intracardiac echo the catheter was advanced across the defect into the left atrium. Utilizing a  soft tipped J wire the multipurpose catheter was ultimately placed in left upper pulmonary vein. The multipurpose was then exchanged for an Amplatzer Super Stiff wire. With the stiff wire in place across the defect, the defect was interrogated in multiple views. At this point, based on the size of the defect, it was felt an 25 mm Amplatzer PFO occluder would provide adequate closure. A 9-Icelandic Amplatzer torque delivery system was then prepped and advanced into the left atrium. The Amplatzer PFO occluder was then prepped and advanced into the left atrium. The left atrial disk was deployed. Under fluoroscopic and echocardiographic visualization, the device was gently retracted until the left disk was in contact with left side of interatrial septum. At this point, the right atrial disk was then deployed. A complete evaluation with intracardiac echocardiogram was performed, which showed excellent capture of the septum primum and septum secundum between the two discs. A push-pull technique was utilized, which showed good capture of all rims of tissue. There was no residual flow around the device. After thorough interrogation,  the device was released. Final echocardiographic images were obtained after device was released, which showed excellent device position. At the conclusion of the procedure, the ICE catheter was removed. A figure-of-eight suture was placed on both sides followed by sheath removal.  Good hemostasis was achieved. Patient was taken to recover in stable condition. CONCLUSION:  Successful PFO closure with 25 mm Amplatzer PFO Occluder.         Fallon Combs MD

## 2020-07-16 NOTE — PROGRESS NOTES
TRANSFER - IN REPORT:    Verbal report received from Cath lab on Elis Gonzalez. being received for routine progression of care. Report consisted of patients Situation, Background, Assessment and Recommendations(SBAR). Information from the following report(s) SBAR, Procedure Summary, Recent Results and Med Rec Status was reviewed. Opportunity for questions and clarification was provided. Assessment completed upon patients arrival to unit and care assumed. Patient received to room 329. Patient connected to monitor and assessment completed. Plan of care reviewed. Patient oriented to room and call light. Patient aware to use call light to communicate any chest pain or needs. Admission skin assessment completed with second RN and reveals the following:   Skin assessment completed. Sacrum blanchable with no redness, scattered scars BUE, heals intact, bilateral groin site post PFO closer. Allevyn placed for prevention only. Encouraged repositioning.

## 2020-07-16 NOTE — ROUTINE PROCESS
TRANSFER - OUT REPORT:    PFO closure  Dr. Ethan Lopez  RFV and LFV access  Closed with Figure 8 suture    Ancef 2g IV  Heparin 10,000 units IV  PO Plavix 600mg  Mylanta 30ml    Versed 3mg, Fentanyl 50mcg    Verbal report given to [facility] RN(name) on [facility].  being transferred to [facility](unit) for routine progression of care       Report consisted of patients Situation, Background, Assessment and   Recommendations(SBAR). Information from the following report(s) SBAR and Procedure Summary was reviewed with the receiving nurse. Lines:   Peripheral IV 07/16/20 Distal;Left;Posterior Forearm (Active)        Opportunity for questions and clarification was provided.       Patient transported with:   [employer]

## 2020-07-16 NOTE — PROGRESS NOTES
Patient received to [address] [place] [address] room # 14  Ambulatory from [facility]. Patient scheduled for PFO today with Dr Anton Fields. Procedure reviewed & questions answered, voiced good understanding consent obtained & placed on chart. All medications and medical history reviewed. Will prep patient per orders. Patient & family updated on plan of care. The patient has a fraility score of 6-MODERATELY FRAIL, based on alert and oriented x4. Patient transferred from [facility] via EMS. Left sided weakness noted.

## 2020-07-16 NOTE — PROGRESS NOTES
TRANSFER - OUT REPORT:    Verbal report given to Madison Arango RN(name) on Larry Dubin.  being transferred to [facility](unit) for routine post - op       Report consisted of patients Situation, Background, Assessment and   Recommendations(SBAR). Information from the following report(s) Procedure Summary was reviewed with the receiving nurse. Opportunity for questions and clarification was provided.

## 2020-07-16 NOTE — PROGRESS NOTES
Patient transferred via EMS from [facility]. Alert and oriented x4. VSS. No skin breakdown noted upon arrival. Patient pleasant and voices understanding of today's procedure. Patient able to answer all questions appropriately. Left sided weakness noted, patient able to assist with repositioning in bed. Speech is garbled.

## 2020-07-17 LAB
ANION GAP SERPL CALC-SCNC: 8 MMOL/L (ref 7–16)
ATRIAL RATE: 81 BPM
BASOPHILS # BLD: 0 K/UL (ref 0–0.2)
BASOPHILS NFR BLD: 1 % (ref 0–2)
BUN SERPL-MCNC: 12 MG/DL (ref 6–23)
CALCIUM SERPL-MCNC: 9.1 MG/DL (ref 8.3–10.4)
CALCULATED P AXIS, ECG09: 102 DEGREES
CALCULATED R AXIS, ECG10: 19 DEGREES
CALCULATED T AXIS, ECG11: 75 DEGREES
CHLORIDE SERPL-SCNC: 111 MMOL/L (ref 98–107)
CO2 SERPL-SCNC: 25 MMOL/L (ref 21–32)
CREAT SERPL-MCNC: 0.89 MG/DL (ref 0.8–1.5)
DIAGNOSIS, 93000: NORMAL
DIFFERENTIAL METHOD BLD: ABNORMAL
EOSINOPHIL # BLD: 0.1 K/UL (ref 0–0.8)
EOSINOPHIL NFR BLD: 2 % (ref 0.5–7.8)
ERYTHROCYTE [DISTWIDTH] IN BLOOD BY AUTOMATED COUNT: 14.6 % (ref 11.9–14.6)
GLUCOSE SERPL-MCNC: 78 MG/DL (ref 65–100)
HCT VFR BLD AUTO: 33.7 % (ref 41.1–50.3)
HGB BLD-MCNC: 11 G/DL (ref 13.6–17.2)
IMM GRANULOCYTES # BLD AUTO: 0 K/UL (ref 0–0.5)
IMM GRANULOCYTES NFR BLD AUTO: 0 % (ref 0–5)
LYMPHOCYTES # BLD: 1.8 K/UL (ref 0.5–4.6)
LYMPHOCYTES NFR BLD: 37 % (ref 13–44)
MAGNESIUM SERPL-MCNC: 1.7 MG/DL (ref 1.8–2.4)
MCH RBC QN AUTO: 27.8 PG (ref 26.1–32.9)
MCHC RBC AUTO-ENTMCNC: 32.6 G/DL (ref 31.4–35)
MCV RBC AUTO: 85.1 FL (ref 79.6–97.8)
MONOCYTES # BLD: 0.3 K/UL (ref 0.1–1.3)
MONOCYTES NFR BLD: 7 % (ref 4–12)
NEUTS SEG # BLD: 2.6 K/UL (ref 1.7–8.2)
NEUTS SEG NFR BLD: 54 % (ref 43–78)
NRBC # BLD: 0 K/UL (ref 0–0.2)
P-R INTERVAL, ECG05: 144 MS
PLATELET # BLD AUTO: 170 K/UL (ref 150–450)
PMV BLD AUTO: 11.7 FL (ref 9.4–12.3)
POTASSIUM SERPL-SCNC: 3.8 MMOL/L (ref 3.5–5.1)
Q-T INTERVAL, ECG07: 384 MS
QRS DURATION, ECG06: 88 MS
QTC CALCULATION (BEZET), ECG08: 446 MS
RBC # BLD AUTO: 3.96 M/UL (ref 4.23–5.6)
SODIUM SERPL-SCNC: 144 MMOL/L (ref 136–145)
VENTRICULAR RATE, ECG03: 81 BPM
WBC # BLD AUTO: 4.9 K/UL (ref 4.3–11.1)

## 2020-07-17 PROCEDURE — 74011250637 HC RX REV CODE- 250/637: Performed by: INTERNAL MEDICINE

## 2020-07-17 PROCEDURE — 74011000250 HC RX REV CODE- 250: Performed by: INTERNAL MEDICINE

## 2020-07-17 PROCEDURE — 99225 PR SBSQ OBSERVATION CARE/DAY 25 MINUTES: CPT | Performed by: INTERNAL MEDICINE

## 2020-07-17 PROCEDURE — 83735 ASSAY OF MAGNESIUM: CPT

## 2020-07-17 PROCEDURE — C8924 2D TTE W OR W/O FOL W/CON,FU: HCPCS

## 2020-07-17 PROCEDURE — 80048 BASIC METABOLIC PNL TOTAL CA: CPT

## 2020-07-17 PROCEDURE — 74011250636 HC RX REV CODE- 250/636: Performed by: INTERNAL MEDICINE

## 2020-07-17 PROCEDURE — 93005 ELECTROCARDIOGRAM TRACING: CPT | Performed by: INTERNAL MEDICINE

## 2020-07-17 PROCEDURE — 85025 COMPLETE CBC W/AUTO DIFF WBC: CPT

## 2020-07-17 PROCEDURE — 87635 SARS-COV-2 COVID-19 AMP PRB: CPT

## 2020-07-17 PROCEDURE — 36415 COLL VENOUS BLD VENIPUNCTURE: CPT

## 2020-07-17 RX ORDER — GUAIFENESIN 100 MG/5ML
81 LIQUID (ML) ORAL DAILY
Qty: 30 TAB | Refills: 5 | Status: SHIPPED | COMMUNITY
Start: 2020-07-17

## 2020-07-17 RX ORDER — CLOPIDOGREL BISULFATE 75 MG/1
75 TABLET ORAL DAILY
Qty: 30 TAB | Refills: 5 | Status: SHIPPED | OUTPATIENT
Start: 2020-07-17 | End: 2021-02-26

## 2020-07-17 RX ADMIN — HYDROCODONE BITARTRATE AND ACETAMINOPHEN 1 TABLET: 5; 325 TABLET ORAL at 20:50

## 2020-07-17 RX ADMIN — Medication 10 ML: at 14:09

## 2020-07-17 RX ADMIN — ATORVASTATIN CALCIUM 80 MG: 80 TABLET, FILM COATED ORAL at 20:52

## 2020-07-17 RX ADMIN — ASPIRIN 81 MG: 81 TABLET, CHEWABLE ORAL at 09:07

## 2020-07-17 RX ADMIN — LISINOPRIL 40 MG: 20 TABLET ORAL at 09:06

## 2020-07-17 RX ADMIN — Medication 10 ML: at 06:01

## 2020-07-17 RX ADMIN — Medication 5 ML: at 22:00

## 2020-07-17 RX ADMIN — PERFLUTREN 1 ML: 6.52 INJECTION, SUSPENSION INTRAVENOUS at 09:00

## 2020-07-17 RX ADMIN — CLOPIDOGREL BISULFATE 75 MG: 75 TABLET ORAL at 09:07

## 2020-07-17 NOTE — ROUTINE PROCESS
Verbal bedside report given to danielle Hartman RN. Patient's situation, background, assessment and recommendations provided. Opportunity for questions provided. Oncoming RN assumed care of patient.

## 2020-07-17 NOTE — PROGRESS NOTES
Bedside and Verbal shift change report to be given to self (oncoming nurse) by Yulia De La Paz (offgoing nurse). Report included the following information SBAR, Kardex, MAR and Recent Results.

## 2020-07-17 NOTE — PROGRESS NOTES
Care Management Interventions  Mode of Transport at Discharge: BLS  Transition of Care Consult (CM Consult): Discharge Planning, SNF  Partner SNF: Yes  Discharge Durable Medical Equipment: No  Physical Therapy Consult: No  Occupational Therapy Consult: No  Speech Therapy Consult: No  Current Support Network: Nursing Facility  Confirm Follow Up Transport: Family  The Plan for Transition of Care is Related to the Following Treatment Goals : SNF  The Patient and/or Patient Representative was Provided with a Choice of Provider and Agrees with the Discharge Plan?: Yes  Name of the Patient Representative Who was Provided with a Choice of Provider and Agrees with the Discharge Plan: Patient  Freedom of Choice List was Provided with Basic Dialogue that Supports the Patient's Individualized Plan of Care/Goals, Treatment Preferences and Shares the Quality Data Associated with the Providers?: Yes  Discharge Location  Discharge Placement: Skilled nursing facility        Pt is resident of [facility], per Ashish Key, facility will require COVID test for return. At this time facility will not accept RAPID COVID results. CM to continue to follow.

## 2020-07-17 NOTE — DISCHARGE SUMMARY
[facility] Discharge Summary     Patient ID:  Kenn Chakraborty  [id number]  53 y.o.  1975    Admit date: 7/16/2020    Discharge date:  07/17/20    Admitting Physician: Kath Whipple MD     Discharge Physician: Nita Toussaint NP/Dr. Dilip Mcadams    Admission Diagnoses: PFO (patent foramen ovale) [Q21.1]    Discharge Diagnoses:   Patient Active Problem List    Diagnosis Date Noted    Hypomagnesemia 03/07/2020    PFO (patent foramen ovale) 12/17/2019    Arrhythmia 12/15/2019    Hyperlipemia 19/09/6602    Acute embolic stroke ([facility] [address] 29/31/9519    Microcytic anemia 11/20/2014    Acute pancreatitis 11/19/2014    GERD (gastroesophageal reflux disease) 11/19/2014    Hypertension     Type 2 diabetes mellitus [facility])        Cardiology Procedures this admission:  Amplatzer atrial septal defect closure, limited echocardiogram    Consults: None    Hospital Course: Patient was seen at the office of [facility] by Dr. Chaka Norman in consultation. He had a documented cryptogenic stroke felt likely due to paradoxical emboli from right to left intracardiac shunt across PFO. Patient was considered a candidate for atrial septal defect closure. Patient was taken to the lab and underwent successful ASD closure with a successful PFO closure with 25 mm Amplatzer PFO Occluder. Patient tolerated the procedure well and was monitored on telemetry. The following morning a repeat limited echo showed -  Left ventricle: Systolic function was normal. Ejection fraction was estimated in the range of 60 % to 65 %. There were no regional wall motion abnormalities. -  Atrial septum: A 25mm Amplatzer PFO Occluder was present.   The afternoon of 7/17/2020, the patient was feeling well and was determined stable and ready for discharge. Patient was instructed on the importance of medication compliance including taking aspirin and plavix everyday without missing a dose.  The patient will remain on dual anti-platelet therapy for at least 6 months. The patient will have a repeat echocardiogram in 30 days on 8/17/2020 at 1030 am in [place] office  and will follow up with [facility] Dr. Audie Litten on 8/21/2020 at 3pm in [place] office. DISPOSITION: The patient is being discharged home in stable condition on a low saturated fat, low cholesterol and low salt diet. The patient is instructed to avoid any aggressive physical activity for 4 weeks. The patient is informed that antibiotic prophylaxis is needed prior to any dental procedures or surgeries. The patient is instructed to report to the ER immediately for any chest pain, dizziness, shortness of breath, numbness, weakness, palpitations or syncope for evaluation and repeat echocardiogram.        Discharge Exam:   Visit Vitals  /82 (BP 1 Location: Left arm, BP Patient Position: At rest)   Pulse 97   Temp 99.1 °F (37.3 °C)   Resp 18   Wt 76.2 kg (167 lb 15.9 oz)   SpO2 99%   BMI 27.11 kg/m²     Patient has been seen by Dr. Rafal Preciado: see his progress note for exam details.     Recent Results (from the past 24 hour(s))   METABOLIC PANEL, BASIC    Collection Time: 07/17/20  4:02 AM   Result Value Ref Range    Sodium 144 136 - 145 mmol/L    Potassium 3.8 3.5 - 5.1 mmol/L    Chloride 111 (H) 98 - 107 mmol/L    CO2 25 21 - 32 mmol/L    Anion gap 8 7 - 16 mmol/L    Glucose 78 65 - 100 mg/dL    BUN 12 6 - 23 MG/DL    Creatinine 0.89 0.8 - 1.5 MG/DL    GFR est AA >60 >60 ml/min/1.73m2    GFR est non-AA >60 >60 ml/min/1.73m2    Calcium 9.1 8.3 - 10.4 MG/DL   MAGNESIUM    Collection Time: 07/17/20  4:02 AM   Result Value Ref Range    Magnesium 1.7 (L) 1.8 - 2.4 mg/dL   CBC WITH AUTOMATED DIFF    Collection Time: 07/17/20  4:02 AM   Result Value Ref Range    WBC 4.9 4.3 - 11.1 K/uL    RBC 3.96 (L) 4.23 - 5.6 M/uL    HGB 11.0 (L) 13.6 - 17.2 g/dL    HCT 33.7 (L) 41.1 - 50.3 %    MCV 85.1 79.6 - 97.8 FL    MCH 27.8 26.1 - 32.9 PG    MCHC 32.6 31.4 - 35.0 g/dL RDW 14.6 11.9 - 14.6 %    PLATELET 267 154 - 421 K/uL    MPV 11.7 9.4 - 12.3 FL    ABSOLUTE NRBC 0.00 0.0 - 0.2 K/uL    DF AUTOMATED      NEUTROPHILS 54 43 - 78 %    LYMPHOCYTES 37 13 - 44 %    MONOCYTES 7 4.0 - 12.0 %    EOSINOPHILS 2 0.5 - 7.8 %    BASOPHILS 1 0.0 - 2.0 %    IMMATURE GRANULOCYTES 0 0.0 - 5.0 %    ABS. NEUTROPHILS 2.6 1.7 - 8.2 K/UL    ABS. LYMPHOCYTES 1.8 0.5 - 4.6 K/UL    ABS. MONOCYTES 0.3 0.1 - 1.3 K/UL    ABS. EOSINOPHILS 0.1 0.0 - 0.8 K/UL    ABS. BASOPHILS 0.0 0.0 - 0.2 K/UL    ABS. IMM. GRANS. 0.0 0.0 - 0.5 K/UL   SARS-COV-2    Collection Time: 07/17/20  5:03 AM   Result Value Ref Range    Specimen source Nasopharyngeal      COVID-19 rapid test Not detected NOTD      SARS CoV-2 PENDING    EKG, 12 LEAD, INITIAL    Collection Time: 07/17/20  7:32 AM   Result Value Ref Range    Ventricular Rate 81 BPM    Atrial Rate 81 BPM    P-R Interval 144 ms    QRS Duration 88 ms    Q-T Interval 384 ms    QTC Calculation (Bezet) 446 ms    Calculated P Axis 102 degrees    Calculated R Axis 19 degrees    Calculated T Axis 75 degrees    Diagnosis       Normal sinus rhythm  Nonspecific T wave abnormality  Abnormal ECG  When compared with ECG of 16-JUL-2020 11:52,  Nonspecific T wave abnormality now evident in Anterior leads  Confirmed by JOSE BEARDEN (), Erin Ek ([id number]) on 7/17/2020 9:16:46 AM           Patient Instructions:   Current Discharge Medication List      START taking these medications    Details   aspirin 81 mg chewable tablet Take 1 Tab by mouth daily. Qty: 30 Tab, Refills: 5      clopidogreL (PLAVIX) 75 mg tab Take 1 Tab by mouth daily. Qty: 30 Tab, Refills: 5         CONTINUE these medications which have NOT CHANGED    Details   icosapent ethyL (VASCEPA) 1 gram capsule Take 1 Cap by mouth two (2) times daily (with meals). Qty: 90 Cap, Refills: 3      atorvastatin (LIPITOR) 80 mg tablet Take 1 Tab by mouth nightly.   Qty: 30 Tab, Refills: 0      lisinopril (PRINIVIL, ZESTRIL) 40 mg tablet Take 1 Tab by mouth daily. Qty: 30 Tab, Refills: 3      acetaminophen (TYLENOL) 325 mg tablet Take  by mouth every four (4) hours as needed for Pain.                Signed:  EUGENIE Garrido  7/17/2020  7:18 AM

## 2020-07-17 NOTE — PROGRESS NOTES
[facility] CARDIOLOGY PROGRESS NOTE           7/17/2020 12:56 PM    Admit Date: 7/16/2020      Subjective:   Resting in  Bed. He denies any pain. Has garbled speech at baseline d/t CVA. His rapid covid test neg but SARS Cov-2 still pending. ROS:  Cardiovascular:  As noted above    Objective:      Vitals:    07/16/20 2114 07/17/20 0158 07/17/20 0632 07/17/20 0856   BP: 109/68 116/83 (!) 150/93 130/82   Pulse: 75 73 82 97   Resp: 18 17 18 18   Temp: 97.7 °F (36.5 °C) 97.8 °F (36.6 °C) 97.9 °F (36.6 °C) 99.1 °F (37.3 °C)   SpO2: 93% 96% 98% 99%   Weight:   76.2 kg (167 lb 15.9 oz)        Physical Exam:  General-No Acute Distress  Neck- supple, no JVD  CV- regular rate and rhythm no MRG  Lung- clear bilaterally  Abd- soft, nontender, nondistended  Ext- no edema bilaterally, no hematoma at Bilateral  groin sites. Skin- warm and dry    Data Review:   Recent Labs     07/17/20  0402 07/16/20  0749    144   K 3.8 3.9   MG 1.7* 1.8   BUN 12 15   CREA 0.89 1.09   GLU 78 84   WBC 4.9 3.8*   HGB 11.0* 11.3*   HCT 33.7* 34.2*    184   INR  --  0.8       Assessment/Plan:     Active Problems:    PFO (patent foramen ovale) (12/17/2019)    S/P PFO closure yesterday. He lives at [address] and unable to return until regular covid test is neg. His limited echo today showed preserved EF and Amplatzer PFO Occluder was present. The patient will continue DAPT for at least 6 months. The patient will have a repeat echocardiogram in 30 days on 8/17/2020 at 1030 am in [place] office  and will follow up with [facility] Dr. Marleen Ventura on 8/21/2020 at [address] [facility] [address] in [place] office.      CVA- see above; on asa, plavix and statin. HTN- controlled; continue home meds    DM- continue home meds      Luc Gimenez NP  7/17/2020 12:56 PM    ATTENDING ADDENDUM:    Patient seen and examined by me. Agree with above note by physician extender.   Key findings are:  No CP or LUCAS, labs stable, echo looks good. Cannot go back to [employer] until covid test negative. CV- RRR without murmur  Lungs- Clear bilaterally  Abd- soft, nontender, nondistended  Ext- no edema, right and left groins CDI    Plan: As above. Doing well, but cannot transfer back to rehab until covid negative. Cbc, bmp, mg in AM    A.  Priya Albarado MD  [facility]  Pager [phone]

## 2020-07-17 NOTE — PROGRESS NOTES
Bedside and Verbal shift change report to be given to KT (oncoming nurse) by self (offgoing nurse). Report included the following information SBAR, Kardex, MAR and Recent Results.

## 2020-07-18 LAB
ANION GAP SERPL CALC-SCNC: 8 MMOL/L (ref 7–16)
BUN SERPL-MCNC: 11 MG/DL (ref 6–23)
CALCIUM SERPL-MCNC: 8.9 MG/DL (ref 8.3–10.4)
CHLORIDE SERPL-SCNC: 112 MMOL/L (ref 98–107)
CO2 SERPL-SCNC: 24 MMOL/L (ref 21–32)
CREAT SERPL-MCNC: 0.95 MG/DL (ref 0.8–1.5)
ERYTHROCYTE [DISTWIDTH] IN BLOOD BY AUTOMATED COUNT: 14.8 % (ref 11.9–14.6)
GLUCOSE SERPL-MCNC: 103 MG/DL (ref 65–100)
HCT VFR BLD AUTO: 32.3 % (ref 41.1–50.3)
HGB BLD-MCNC: 10.6 G/DL (ref 13.6–17.2)
MAGNESIUM SERPL-MCNC: 1.7 MG/DL (ref 1.8–2.4)
MCH RBC QN AUTO: 27.7 PG (ref 26.1–32.9)
MCHC RBC AUTO-ENTMCNC: 32.8 G/DL (ref 31.4–35)
MCV RBC AUTO: 84.6 FL (ref 79.6–97.8)
NRBC # BLD: 0 K/UL (ref 0–0.2)
PLATELET # BLD AUTO: 171 K/UL (ref 150–450)
PMV BLD AUTO: 11.3 FL (ref 9.4–12.3)
POTASSIUM SERPL-SCNC: 3.8 MMOL/L (ref 3.5–5.1)
RBC # BLD AUTO: 3.82 M/UL (ref 4.23–5.6)
SODIUM SERPL-SCNC: 144 MMOL/L (ref 136–145)
WBC # BLD AUTO: 4.5 K/UL (ref 4.3–11.1)

## 2020-07-18 PROCEDURE — 83735 ASSAY OF MAGNESIUM: CPT

## 2020-07-18 PROCEDURE — 85027 COMPLETE CBC AUTOMATED: CPT

## 2020-07-18 PROCEDURE — 80048 BASIC METABOLIC PNL TOTAL CA: CPT

## 2020-07-18 PROCEDURE — 99225 PR SBSQ OBSERVATION CARE/DAY 25 MINUTES: CPT | Performed by: INTERNAL MEDICINE

## 2020-07-18 PROCEDURE — 36415 COLL VENOUS BLD VENIPUNCTURE: CPT

## 2020-07-18 PROCEDURE — 74011250637 HC RX REV CODE- 250/637: Performed by: INTERNAL MEDICINE

## 2020-07-18 RX ADMIN — ASPIRIN 81 MG: 81 TABLET, CHEWABLE ORAL at 08:31

## 2020-07-18 RX ADMIN — Medication 10 ML: at 14:00

## 2020-07-18 RX ADMIN — HYDROCODONE BITARTRATE AND ACETAMINOPHEN 1 TABLET: 5; 325 TABLET ORAL at 18:00

## 2020-07-18 RX ADMIN — HYDROCODONE BITARTRATE AND ACETAMINOPHEN 1 TABLET: 5; 325 TABLET ORAL at 22:10

## 2020-07-18 RX ADMIN — CLOPIDOGREL BISULFATE 75 MG: 75 TABLET ORAL at 08:31

## 2020-07-18 RX ADMIN — Medication 5 ML: at 06:01

## 2020-07-18 RX ADMIN — ATORVASTATIN CALCIUM 80 MG: 80 TABLET, FILM COATED ORAL at 21:58

## 2020-07-18 RX ADMIN — Medication 10 ML: at 21:58

## 2020-07-18 RX ADMIN — LISINOPRIL 40 MG: 20 TABLET ORAL at 08:31

## 2020-07-18 NOTE — ROUTINE PROCESS
Bedside and Verbal shift change report given to myself (oncoming nurse) by Keesha Sena RN and Jef Valdez RN (offgoing nurse).  Report included the following information SBAR, Kardex, [facility] and Recent Results. '

## 2020-07-18 NOTE — PROGRESS NOTES
Bedside and Verbal shift change report to be given to Vin Byrnes (oncoming nurse) by self (offgoing nurse). Report included the following information SBAR, Kardex, MAR and Recent Results.

## 2020-07-18 NOTE — PROGRESS NOTES
[facility] CARDIOLOGY PROGRESS NOTE           7/18/2020 10:09 AM    Admit Date: 7/16/2020      Subjective:   No complaints. ROS:  GEN:  No fever or chills  Cardiovascular:  As noted above:no CP or palpitations. Pulmonary:  As noted above:no SOB  Neuro:  No new focal motor or sensory loss    Objective:      Vitals:    07/18/20 0057 07/18/20 0449 07/18/20 0831 07/18/20 0848   BP: 107/69 108/72 103/72 109/75   Pulse: 84 84 96 91   Resp: 18 18  14   Temp: 98.1 °F (36.7 °C) 98.2 °F (36.8 °C)  98.9 °F (37.2 °C)   SpO2: 96% 96%  97%   Weight:  76.8 kg (169 lb 6.4 oz)         Physical Exam:  General-no complaints. Neck- supple, no JVD  CV- regular rate and rhythm no MRG  Lung- clear bilaterally  Abd- soft, nontender, nondistended  Ext- no edema bilaterally. Skin- warm and dry  Psychiatric:  Normal mood and affect. Neurologic:  Alert and oriented X 3      Data Review:   Recent Labs     07/18/20  0423 07/17/20  0402 07/16/20  0749    144 144   K 3.8 3.8 3.9   MG 1.7* 1.7* 1.8   BUN 11 12 15   CREA 0.95 0.89 1.09   * 78 84   WBC 4.5 4.9 3.8*   HGB 10.6* 11.0* 11.3*   HCT 32.3* 33.7* 34.2*    170 184   INR  --   --  0.8       TELEMETRY:  NSR    Assessment/Plan:     Active Problems:    PFO (patent foramen ovale) (12/17/2019): Stable. Awaiting returning to NH. SARS Covid-2 pending.                 Laurent Lainez MD  7/18/2020 10:09 AM

## 2020-07-18 NOTE — PROGRESS NOTES
Bedside and Verbal shift change report to be given to self (oncoming nurse) by Leroy Rincon (offgoing nurse). Report included the following information SBAR, Kardex, MAR and Recent Results.

## 2020-07-19 LAB
COVID-19 RAPID TEST, COVR: NOT DETECTED
SARS COV-2, XPGCVT: NEGATIVE
SOURCE, COVRS: NORMAL

## 2020-07-19 PROCEDURE — 77030010545

## 2020-07-19 PROCEDURE — 99225 PR SBSQ OBSERVATION CARE/DAY 25 MINUTES: CPT | Performed by: INTERNAL MEDICINE

## 2020-07-19 PROCEDURE — 74011250637 HC RX REV CODE- 250/637: Performed by: INTERNAL MEDICINE

## 2020-07-19 PROCEDURE — 77030040829 HC CATH EXT URINE MDII -B

## 2020-07-19 RX ADMIN — HYDROCODONE BITARTRATE AND ACETAMINOPHEN 1 TABLET: 5; 325 TABLET ORAL at 08:31

## 2020-07-19 RX ADMIN — Medication 10 ML: at 21:44

## 2020-07-19 RX ADMIN — CLOPIDOGREL BISULFATE 75 MG: 75 TABLET ORAL at 08:27

## 2020-07-19 RX ADMIN — LISINOPRIL 40 MG: 20 TABLET ORAL at 08:27

## 2020-07-19 RX ADMIN — HYDROCODONE BITARTRATE AND ACETAMINOPHEN 1 TABLET: 5; 325 TABLET ORAL at 20:28

## 2020-07-19 RX ADMIN — Medication 10 ML: at 05:33

## 2020-07-19 RX ADMIN — Medication 10 ML: at 14:00

## 2020-07-19 RX ADMIN — ATORVASTATIN CALCIUM 80 MG: 80 TABLET, FILM COATED ORAL at 21:44

## 2020-07-19 RX ADMIN — ASPIRIN 81 MG: 81 TABLET, CHEWABLE ORAL at 08:26

## 2020-07-19 NOTE — ROUTINE PROCESS
Bedside and Verbal shift change report to be given to Albert Leal RN and Mara Chung RN (oncoming nurse) by myself (offgoing nurse). Report included the following information SBAR, Kardex, MAR and Recent Results.

## 2020-07-19 NOTE — PROGRESS NOTES
Bedside and Verbal shift change report to be given to Stephanie (oncoming nurse) by self (offgoing nurse). Report included the following information SBAR, Kardex, MAR and Recent Results.

## 2020-07-19 NOTE — PROGRESS NOTES
[facility] CARDIOLOGY PROGRESS NOTE           7/19/2020 11:15 AM    Admit Date: 7/16/2020      Subjective:   No complaints. ROS:  GEN:  No fever or chills  Cardiovascular:  As noted above:no CP or palpitations  Pulmonary:  As noted above:no SOB  Neuro:  No new focal motor or sensory loss    Objective:      Vitals:    07/19/20 0043 07/19/20 0447 07/19/20 0826 07/19/20 0900   BP: 95/64 116/70 124/80 124/80   Pulse: 83 83 95 88   Resp: 18 20  16   Temp: 98.7 °F (37.1 °C) 98.2 °F (36.8 °C)  97.5 °F (36.4 °C)   SpO2: 97% 99%  99%   Weight:  77.2 kg (170 lb 4.8 oz)         Physical Exam:  General-no distress  Neck- supple, no JVD  CV- regular rate and rhythm with 1/6 KRISHNA  Lung- clear bilaterally  Abd- soft, nontender, nondistended  Ext- no edema bilaterally. Skin- warm and dry  Psychiatric:  Normal mood and affect. Neurologic:  Alert and oriented X 3      Data Review:   Recent Labs     07/18/20  0423 07/17/20  0402    144   K 3.8 3.8   MG 1.7* 1.7*   BUN 11 12   CREA 0.95 0.89   * 78   WBC 4.5 4.9   HGB 10.6* 11.0*   HCT 32.3* 33.7*    170       TELEMETRY:  NSR    Assessment/Plan:     Active Problems:     1. PFO (patent foramen ovale) (12/17/2019):S/P PFO closure. SARS CoV-2 negative. Transfer back to NH in am.     2:CVA:continue ASA,Plavix,and statin. 3:Hypertension:Continue Lisinopril.             Elijah Block MD  7/19/2020 11:15 AM

## 2020-07-19 NOTE — PROGRESS NOTES
Bedside and Verbal shift change report to be given to self (oncoming nurse) by Anthony Fabian (offgoing nurse). Report included the following information SBAR, Kardex, MAR and Recent Results.

## 2020-07-20 VITALS
RESPIRATION RATE: 18 BRPM | DIASTOLIC BLOOD PRESSURE: 81 MMHG | HEART RATE: 90 BPM | SYSTOLIC BLOOD PRESSURE: 117 MMHG | BODY MASS INDEX: 27.66 KG/M2 | WEIGHT: 171.4 LBS | TEMPERATURE: 98.1 F | OXYGEN SATURATION: 98 %

## 2020-07-20 PROCEDURE — 99225 PR SBSQ OBSERVATION CARE/DAY 25 MINUTES: CPT | Performed by: INTERNAL MEDICINE

## 2020-07-20 PROCEDURE — 74011250637 HC RX REV CODE- 250/637: Performed by: INTERNAL MEDICINE

## 2020-07-20 RX ADMIN — CLOPIDOGREL BISULFATE 75 MG: 75 TABLET ORAL at 09:01

## 2020-07-20 RX ADMIN — ASPIRIN 81 MG: 81 TABLET, CHEWABLE ORAL at 09:01

## 2020-07-20 RX ADMIN — Medication 10 ML: at 05:37

## 2020-07-20 RX ADMIN — LISINOPRIL 40 MG: 20 TABLET ORAL at 09:01

## 2020-07-20 RX ADMIN — HYDROCODONE BITARTRATE AND ACETAMINOPHEN 1 TABLET: 5; 325 TABLET ORAL at 09:04

## 2020-07-20 NOTE — PROGRESS NOTES
Care Management Interventions  Mode of Transport at Discharge: BLS  Transition of Care Consult (CM Consult): Discharge Planning, SNF  Partner SNF: Yes  Discharge Durable Medical Equipment: No  Physical Therapy Consult: No  Occupational Therapy Consult: No  Speech Therapy Consult: No  Current Support Network: Nursing Facility  Confirm Follow Up Transport: Family  The Plan for Transition of Care is Related to the Following Treatment Goals : SNF  The Patient and/or Patient Representative was Provided with a Choice of Provider and Agrees with the Discharge Plan?: Yes  Name of the Patient Representative Who was Provided with a Choice of Provider and Agrees with the Discharge Plan: Patient  Freedom of Choice List was Provided with Basic Dialogue that Supports the Patient's Individualized Plan of Care/Goals, Treatment Preferences and Shares the Quality Data Associated with the Providers?: Yes  Discharge Location  Discharge Placement: Skilled nursing facility    CM informed pt is medically stable for discharge to [facility]. Covid test negative. Facility agrees to transfer today. [facility] for transport at [facility]. No further CM needs.

## 2020-07-20 NOTE — ROUTINE PROCESS
TRANSFER - OUT REPORT:    Verbal report given to JERRY Kumar on [facility] [place].  being transferred to [place] for routine progression of care       Report consisted of patients Situation, Background, Assessment and Recommendations(SBAR). Information from the following report(s) SBAR, Kardex and Procedure Summary was reviewed with the receiving nurse. Opportunity for questions and clarification was provided.

## 2020-07-20 NOTE — PROGRESS NOTES
[facility] CARDIOLOGY PROGRESS NOTE           7/20/2020 8:42 AM    Admit Date: 7/16/2020    Subjective:   Patient doing well this AM, hemodynamics stable. No chest pain or trouble breathing. ROS:  Cardiovascular:  As noted above    Objective:      Vitals:    07/19/20 1700 07/19/20 2059 07/20/20 0045 07/20/20 0504   BP: 126/86 121/82 119/78 121/79   Pulse: 78 83 80 89   Resp: 16 18 18 18   Temp: 98.6 °F (37 °C) 98 °F (36.7 °C) 98.2 °F (36.8 °C) 97.1 °F (36.2 °C)   SpO2: 99% 98% 96% 97%   Weight:    171 lb 6.4 oz (77.7 kg)       Physical Exam:  General-No Acute Distress  Neck- supple, no JVD  CV- regular rate and rhythm no MRG  Lung- clear bilaterally, no wheezes   Abd- soft, nontender, nondistended  Ext- no edema bilaterally. Skin- warm and dry    Data Review:   Recent Labs     07/18/20  0423      K 3.8   MG 1.7*   BUN 11   CREA 0.95   *   WBC 4.5   HGB 10.6*   HCT 32.3*        Current Outpatient Medications   Medication Instructions    acetaminophen (TYLENOL) 325 mg tablet Oral, EVERY 4 HOURS AS NEEDED    aspirin 81 mg, Oral, DAILY    atorvastatin (LIPITOR) 80 mg, Oral, EVERY BEDTIME    clopidogreL (PLAVIX) 75 mg, Oral, DAILY    icosapent ethyL (VASCEPA) 1 gram capsule 1 Cap, Oral, 2 TIMES DAILY WITH MEALS    lisinopriL (PRINIVIL, ZESTRIL) 40 mg, Oral, DAILY     Assessment/Plan:     Active Problems:    PFO (patent foramen ovale) (12/17/2019)    - Echo no effusion, closure device in appropriate position    - hemodynamics stable, EKG sinus rhythm    - ASA, Plavix      Hyperlipidemia    - on Vascepa , Atorvastatin       Essential Hypertension    - controlled on lisinopril      CVA     - per neurology, stable    Labs normal, Covid testing negative , appropriate for discharge back to NH today pending bed availability.        Kaye Arellano DO  7/20/2020 8:42 AM

## 2020-07-20 NOTE — DISCHARGE SUMMARY
[facility] Discharge Summary     Patient ID:  Evette Suarez  [id number]  53 y.o.  1975    Admit date: 7/16/2020    Discharge date:  07/20/20    Admitting Physician: Fallon Combs MD     Discharge Physician: Carlene Kaye NP/Dr. Efrain Silva    Admission Diagnoses: PFO (patent foramen ovale) [Q21.1]    Discharge Diagnoses:   Patient Active Problem List    Diagnosis Date Noted    Hypomagnesemia 03/07/2020    PFO (patent foramen ovale) 12/17/2019    Arrhythmia 12/15/2019    Hyperlipemia 78/57/7119    Acute embolic stroke ([address] 27/49/1578    Microcytic anemia 11/20/2014    Acute pancreatitis 11/19/2014    GERD (gastroesophageal reflux disease) 11/19/2014    Hypertension     Type 2 diabetes mellitus [facility])        Cardiology Procedures this admission:  Amplatzer atrial septal defect closure, limited echocardiogram    Consults: None    Hospital Course: Patient was seen at the office of [facility] by Dr. Elfego Shetty in consultation. He had a documented cryptogenic stroke felt likely due to paradoxical emboli from right to left intracardiac shunt across PFO. Patient was considered a candidate for atrial septal defect closure. Patient was taken to the lab and underwent successful ASD closure with a successful PFO closure with 25 mm Amplatzer PFO Occluder. Patient tolerated the procedure well and was monitored on telemetry. The following morning a repeat limited echo showed -  Left ventricle: Systolic function was normal. Ejection fraction was estimated in the range of 60 % to 65 %. There were no regional wall motion abnormalities. -  Atrial septum: A 25mm Amplatzer PFO Occluder was present.     The afternoon of 7/17/2020, the patient was feeling well and was determined stable and ready for discharge. The patient discharge was delayed due to pending COVID ruleout with negative results found on 7/19/2020.  The patient was instructed on the importance of medication compliance including taking aspirin and plavix everyday without missing a dose. The patient will remain on dual anti-platelet therapy for at least 6 months. The patient will have a repeat echocardiogram in 30 days on 8/17/2020 at 1030 am in [place] office  and will follow up with [facility] Dr. Juanita Nava on 8/21/2020 at 3pm in [place] office. DISPOSITION: The patient is being discharged STR in stable condition on a low saturated fat, low cholesterol and low salt diet. The patient is instructed to avoid any aggressive physical activity for 4 weeks. The patient is informed that antibiotic prophylaxis is needed prior to any dental procedures or surgeries. The patient is instructed to report to the ER immediately for any chest pain, dizziness, shortness of breath, numbness, weakness, palpitations or syncope for evaluation and repeat echocardiogram.        Discharge Exam:   Visit Vitals  /79 (BP 1 Location: Right arm, BP Patient Position: At rest)   Pulse 89   Temp 97.1 °F (36.2 °C)   Resp 18   Wt 77.7 kg (171 lb 6.4 oz)   SpO2 97%   BMI 27.66 kg/m²     Patient has been seen by Dr. Andrew Rodriguez: see his progress note for exam details. No results found for this or any previous visit (from the past 24 hour(s)). Patient Instructions:   Current Discharge Medication List      START taking these medications    Details   aspirin 81 mg chewable tablet Take 1 Tab by mouth daily. Qty: 30 Tab, Refills: 5      clopidogreL (PLAVIX) 75 mg tab Take 1 Tab by mouth daily. Qty: 30 Tab, Refills: 5         CONTINUE these medications which have NOT CHANGED    Details   icosapent ethyL (VASCEPA) 1 gram capsule Take 1 Cap by mouth two (2) times daily (with meals). Qty: 90 Cap, Refills: 3      atorvastatin (LIPITOR) 80 mg tablet Take 1 Tab by mouth nightly. Qty: 30 Tab, Refills: 0      lisinopril (PRINIVIL, ZESTRIL) 40 mg tablet Take 1 Tab by mouth daily.   Qty: 30 Tab, Refills: 3      acetaminophen (TYLENOL) 325 mg tablet Take  by mouth every four (4) hours as needed for Pain. Signed:  EUGENIE Cerrato  7/20/2020  7:18 AM    I have independently seen and examined the patient with the physician extender and agree with above assessment/plan. The patient is stable for discharge. Medications and follow up plans were reviewed with the patient. Status post PFO closure on ASA, plavix. Echo after procedure showed appropriate position of occluder device without pericardial effusion. Hemodynamically and electrically stable. appropriate for discharge today. All questions were answered with the patient voicing full understanding. Please refer to my progress note from today for discharge day examination and further details. Follow up appointments as above.

## 2020-07-20 NOTE — DISCHARGE INSTRUCTIONS
Patient Education        Heart Defect Repair: What to Expect at Home  Your Recovery     Heart defect repair is surgery to fix a heart problem that prevents blood from flowing normally through the heart. You can expect the cut (incision) in your chest to be sore for a few weeks. The doctor will take the stitches out of your incision about 1 to 3 weeks after surgery. You will probably feel more tired than usual for several weeks after surgery. You may be able to do many of your usual activities after 4 to 6 weeks. But you will probably need 2 to 3 months to fully recover. Some people find that they feel sad or more emotional while they are recovering after this surgery. This may last for up to 6 weeks after surgery. Talk with your doctor if your sadness continues or you have concerns about how you are feeling. Treatment and other support can help you feel better. This care sheet gives you a general idea about how long it will take for you to recover. But each person recovers at a different pace. Follow the steps below to feel better as quickly as possible. How can you care for yourself at home? Activity  · Rest when you feel tired. Getting enough sleep will help you recover. · Try to walk each day. Start by walking a little more than you did the day before. Bit by bit, increase the amount you walk. Walking boosts blood flow and helps prevent pneumonia and constipation. · Avoid strenuous activities, such as bicycle riding, jogging, weight lifting, or aerobic exercise, until your doctor says it is okay. · For 3 months, avoid lifting anything that would make you strain. This may include a child, heavy grocery bags and milk containers, a heavy briefcase or backpack, cat litter or dog food bags, or a vacuum . · Hold a pillow over your incision when you cough or take deep breaths. This will support your chest and decrease your pain. · Do breathing exercises at home as instructed by your doctor.  This will help prevent pneumonia. · You may shower as usual. Pat the incision dry. Do not take a bath for the first 2 weeks, or until your doctor tells you it is okay. · Ask your doctor when you can drive again. · You will probably need to take at least 4 to 6 weeks off from work, depending on your job. If your job requires heavy labor, you may need to take as much as 3 months off. · Ask your doctor when it is okay for you to have sex. Diet  · You can eat your normal diet. If your stomach is upset, try bland, low-fat foods like plain rice, broiled chicken, toast, and yogurt. · You may not feel as hungry as usual. Or food may not taste as good as it usually does. This is common and usually gets better about 4 weeks after surgery. If you do not feel like eating, you may want to drink liquid meal replacements for extra calories and protein. This can help you keep up your strength and prevent weight loss. · Drink plenty of fluids (unless your doctor tells you not to). · You may notice that your bowel movements are not regular right after your surgery. This is common. Try to avoid constipation and straining with bowel movements. You may want to take a fiber supplement every day. If you have not had a bowel movement after a couple of days, ask your doctor about taking a mild laxative. Medicines  · Your doctor will tell you if and when you can restart your medicines. He or she will also give you instructions about taking any new medicines. · If you take aspirin or some other blood thinner, ask your doctor if and when to start taking it again. Make sure that you understand exactly what your doctor wants you to do. · Be safe with medicines. Take pain medicines exactly as directed. ? If the doctor gave you a prescription medicine for pain, take it as prescribed. ? If you are not taking a prescription pain medicine, ask your doctor if you can take an over-the-counter medicine.   ? Do not take two or more pain medicines at the same time unless the doctor told you to. Many pain medicines have acetaminophen, which is Tylenol. Too much acetaminophen (Tylenol) can be harmful. · If you think your pain medicine is making you sick to your stomach:  ? Take your medicine after meals (unless your doctor has told you not to). ? Ask your doctor for a different pain medicine. · If your doctor prescribed antibiotics, take them as directed. Do not stop taking them just because you feel better. You need to take the full course of antibiotics. · If you take a blood thinner, be sure to get instructions about how to take this medicine safely. Blood thinners can cause serious bleeding problems. Incision care  · If you have strips of tape on the incision, leave the tape on for a week or until it falls off. · Wash the area daily with warm, soapy water, and pat it dry. Other cleaning products, such as hydrogen peroxide, can make the wound heal more slowly. You may cover the area with a gauze bandage if it weeps or rubs against clothing. Change the bandage every day. · Keep the area clean and dry. Follow-up care is a key part of your treatment and safety. Be sure to make and go to all appointments, and call your doctor if you are having problems. It's also a good idea to know your test results and keep a list of the medicines you take. When should you call for help? [id number] anytime you think you may need emergency care. For example, call if:  · You passed out (lost consciousness). · You have trouble breathing. Call your doctor now or seek immediate medical care if:  · You have severe pain in your leg, or it becomes cold, pale, blue, tingly, or numb. · You have pain that does not get better after you take pain medicine. · You have loose stitches, or your incisions come open. · You are bleeding a lot from the incisions. · You have signs of infection, such as:  ? Increased pain, swelling, warmth, or redness.   ? Red streaks leading from the incision. ? Pus draining from the incision. ? A fever. · You are sick to your stomach or cannot keep fluids down. Watch closely for any changes in your health, and be sure to contact your doctor if:  · You do not get better as expected. Where can you learn more? Go to http://sarah-wayne.info/  Enter [id number] in the search box to learn more about \"Heart Defect Repair: What to Expect at Home. \"  Current as of: December 16, 2019               Content Version: 12.5  © 4283-4691 [employer]. Care instructions adapted under license by [employer] (which disclaims liability or warranty for this information). If you have questions about a medical condition or this instruction, always ask your healthcare professional. [address] any warranty or liability for your use of this information. Patient Education      Clopidogrel (Plavix) - (By mouth)   Why this medicine is used:   Helps prevent stroke, heart attack, and other heart problems. Contact a nurse or doctor right away if you have:  · Sudden or severe headache  · Bloody vomit or vomit that looks like coffee grounds; bloody or black, tarry stools  · Bleeding that does not stop or bruises that do not heal  · Dark urine or pale stools, nausea, loss of appetite, stomach pain,  · Yellow skin or eyes     Common side effects:  · Minor bleeding or bruising  © 2017 [facility] Information is for End User's use only and may not be sold, redistributed or otherwise used for commercial purposes. Patient Education      Aspirin (Florina Extra Strength, Florina Aspirin Children's, Bufferin, Bufferin Low Dose) - (By mouth)   Why this medicine is used:   Treats pain, fever, and inflammation. May also reduce the risk of heart attack.   Contact a nurse or doctor right away if you have:  · Bloody vomit or vomit that looks like coffee grounds  · Blood in urine or bloody or black, tarry stools  · Wheezing or trouble breathing     Common side effects:  · Upset stomach  © 2017 [address] Information is for End User's use only and may not be sold, redistributed or otherwise used for commercial purposes. DISCHARGE SUMMARY from Nurse    PATIENT INSTRUCTIONS:    After general anesthesia or intravenous sedation, for 24 hours or while taking prescription Narcotics:  · Limit your activities  · Do not drive and operate hazardous machinery  · Do not make important personal or business decisions  · Do  not drink alcoholic beverages  · If you have not urinated within 8 hours after discharge, please contact your surgeon on call. Report the following to your surgeon:  · Excessive pain, swelling, redness or odor of or around the surgical area  · Temperature over 100.5  · Nausea and vomiting lasting longer than 4 hours or if unable to take medications  · Any signs of decreased circulation or nerve impairment to extremity: change in color, persistent  numbness, tingling, coldness or increase pain  · Any questions    What to do at Home:  Recommended activity: Activity as tolerated. *  Please give a list of your current medications to your Primary Care Provider. *  Please update this list whenever your medications are discontinued, doses are      changed, or new medications (including over-the-counter products) are added. *  Please carry medication information at all times in case of emergency situations. These are general instructions for a healthy lifestyle:    No smoking/ No tobacco products/ Avoid exposure to second hand smoke  Surgeon General's Warning:  Quitting smoking now greatly reduces serious risk to your health.     Obesity, smoking, and sedentary lifestyle greatly increases your risk for illness    A healthy diet, regular physical exercise & weight monitoring are important for maintaining a healthy lifestyle    You may be retaining fluid if you have a history of heart failure or if you experience any of the following symptoms:  Weight gain of 3 pounds or more overnight or 5 pounds in a week, increased swelling in our hands or feet or shortness of breath while lying flat in bed. Please call your doctor as soon as you notice any of these symptoms; do not wait until your next office visit. The discharge information has been reviewed with the patient. The patient verbalized understanding. Discharge medications reviewed with the patient and appropriate educational materials and side effects teaching were provided.   ___________________________________________________________________________________________________________________________________

## 2020-07-20 NOTE — PROGRESS NOTES
Care Management Interventions  Mode of Transport at Discharge: BLS  Transition of Care Consult (CM Consult): Discharge Planning, SNF  Partner SNF: Yes  Discharge Durable Medical Equipment: No  Physical Therapy Consult: No  Occupational Therapy Consult: No  Speech Therapy Consult: No  Current Support Network: Nursing Facility  Confirm Follow Up Transport: Family  The Plan for Transition of Care is Related to the Following Treatment Goals : SNF  The Patient and/or Patient Representative was Provided with a Choice of Provider and Agrees with the Discharge Plan?: Yes  Name of the Patient Representative Who was Provided with a Choice of Provider and Agrees with the Discharge Plan: Patient  Freedom of Choice List was Provided with Basic Dialogue that Supports the Patient's Individualized Plan of Care/Goals, Treatment Preferences and Shares the Quality Data Associated with the Providers?: Yes  Discharge Location  Discharge Placement: [address] [place] [address]. [facility]. No further CM needs.

## 2020-07-20 NOTE — ROUTINE PROCESS
Bedside and Verbal shift change report given to myself (oncoming nurse) by Tyler Dykes RN and Jessika Lanza RN (offgoing nurse). Report included the following information SBAR, Kardex, MAR and Recent Results.

## 2020-07-20 NOTE — ROUTINE PROCESS
Bedside and Verbal shift change report to be given to Celia Parker RN (oncoming nurse) by myself (offgoing nurse). Report included the following information SBAR, Kardex, MAR and Recent Results.

## 2020-08-03 NOTE — ED PROVIDER NOTES
Discussed future YAG LASER Capsulotomy with patient. HPI Comments: Patient is here with sinus pressure, congestion and not feeling well for the last week or more. He has not had a fever, nausea, vomiting, visual changes, chest pain, shortness of breath, dizziness, abdominal pain, weakness or other symptoms. He was ambulatory to the room without difficulty and well-hydrated. He has been using over-the-counter Tylenol combination medication for the symptoms. Patient did not take his blood pressure medication this morning as he had to be to work early. He states he has not seen his primary care physician in over 2 months because he does not have any health insurance. He did refill a lisinopril from the emergency room in December here at [facility]. He was counseled on his blood pressure at that time. Patient is a 38 y.o. male presenting with sinus problems. The history is provided by the patient. Sinus Infection    This is a new problem. The current episode started more than 1 week ago. The problem has been gradually worsening. There has been no fever. The pain is at a severity of 7/10. The pain is moderate. The pain has been constant since onset. Associated symptoms include congestion, sinus pressure and rhinorrhea. Pertinent negatives include no chills, no sweats, no ear pain, no hoarse voice, no sore throat, no swollen glands, no cough, no shortness of breath, no neck pain, no neck pain, no headaches and no chest pain. Treatments tried: OTC combination cold product. The treatment provided mild relief.         Past Medical History:   Diagnosis Date    Acute pancreatitis 11/19/2014    Diabetes ([facility] [address] 2002    Diabetes ([facility] [address]     Diabetes mellitus     Hypertension        Past Surgical History:   Procedure Laterality Date    HX HERNIA REPAIR      HX ORTHOPAEDIC      carpal tunnel surgery         Family History:   Problem Relation Age of Onset    Diabetes Mother     Diabetes Father        Social History     Social History    Marital status:  Spouse name: N/A    Number of children: N/A    Years of education: N/A     Occupational History    Not on file. Social History Main Topics    Smoking status: Current Every Day Smoker     Packs/day: 0.25     Types: Cigarettes    Smokeless tobacco: Former User     Types: Chew    Alcohol use No    Drug use: No    Sexual activity: Yes     Partners: Female     Birth control/ protection: None     Other Topics Concern    Not on file     Social History Narrative         ALLERGIES: Review of patient's allergies indicates no known allergies. Review of Systems   Constitutional: Negative. Negative for chills. HENT: Positive for congestion, rhinorrhea and sinus pressure. Negative for ear pain, hoarse voice and sore throat. Eyes: Negative. Respiratory: Negative. Negative for cough and shortness of breath. Cardiovascular: Negative. Negative for chest pain. Gastrointestinal: Negative. Genitourinary: Negative. Musculoskeletal: Negative. Negative for neck pain. Skin: Negative. Neurological: Negative. Negative for headaches. Psychiatric/Behavioral: Negative. All other systems reviewed and are negative. Vitals:    01/08/18 1501   BP: (!) 198/122   Pulse: (!) 109   Resp: 20   Temp: 98.9 °F (37.2 °C)   SpO2: 98%   Weight: 99.8 kg (220 lb)   Height: 5' 6\" (1.676 m)            Physical Exam   Constitutional: He is oriented to person, place, and time. He appears well-developed and well-nourished. HENT:   Head: Normocephalic and atraumatic. Right Ear: External ear normal.   Left Ear: External ear normal.   Mouth/Throat: Oropharynx is clear and moist.   Right maxillary sinus tenderness. Mild swelling to nasal turbinates bilaterally. Eyes: Conjunctivae and EOM are normal. Pupils are equal, round, and reactive to light. Neck: Normal range of motion. Neck supple. Cardiovascular: Normal rate, regular rhythm, normal heart sounds and intact distal pulses.   Exam reveals no gallop and no friction rub. No murmur heard. Pulmonary/Chest: Effort normal and breath sounds normal. No respiratory distress. He has no wheezes. He has no rales. He exhibits no tenderness. Abdominal: Soft. Bowel sounds are normal. He exhibits no distension and no mass. There is no tenderness. There is no rebound and no guarding. Musculoskeletal: Normal range of motion. Neurological: He is alert and oriented to person, place, and time. He has normal strength and normal reflexes. No cranial nerve deficit or sensory deficit. He displays a negative Romberg sign. GCS eye subscore is 4. GCS verbal subscore is 5. GCS motor subscore is 6. Reflex Scores:       Tricep reflexes are 2+ on the right side and 2+ on the left side. Bicep reflexes are 2+ on the right side and 2+ on the left side. Brachioradialis reflexes are 2+ on the right side and 2+ on the left side. Patellar reflexes are 2+ on the right side and 2+ on the left side. Achilles reflexes are 2+ on the right side and 2+ on the left side. Skin: Skin is warm and dry. Psychiatric: He has a normal mood and affect. His behavior is normal. Judgment and thought content normal.   Nursing note and vitals reviewed. MDM  Number of Diagnoses or Management Options  Maxillary sinusitis, unspecified chronicity: minor  Uncontrolled hypertension: minor  Risk of Complications, Morbidity, and/or Mortality  Presenting problems: moderate  Diagnostic procedures: moderate  Management options: moderate    Patient Progress  Patient progress: stable    ED Course       Procedures    The patient was observed in the ED.     Results Reviewed:      Recent Results (from the past 24 hour(s))   GLUCOSE, POC    Collection Time: 01/08/18  3:28 PM   Result Value Ref Range    Glucose (POC) 122 (H) 65 - 100 mg/dL     Keep a check on BP, check it once or twice weekly with same blood pressure monitor, write the number down with date and time and take that with you to see a PCP for recheck of BP. I have added Norvasc to his lisinopril and asked Olegario Ceron,  to see about getting him in somewhere because he does not have health insurance. I have written him in and about for his sinus infection with some nasal spray. We did not do any steroids today because he is a diabetic. He was counseled on no over-the-counter congestion medicines as it can raise his blood pressure. I discussed the results of all labs, procedures, radiographs, and treatments with the patient and available family. Treatment plan is agreed upon and the patient is ready for discharge. All voiced understanding of the discharge plan and medication instructions or changes as appropriate. Questions about treatment in the ED were answered. All were encouraged to return should symptoms worsen or new problems develop.

## 2020-10-18 ENCOUNTER — APPOINTMENT (OUTPATIENT)
Dept: CT IMAGING | Age: 45
DRG: 710 | End: 2020-10-18
Attending: EMERGENCY MEDICINE
Payer: MEDICAID

## 2020-10-18 ENCOUNTER — HOSPITAL ENCOUNTER (INPATIENT)
Age: 45
LOS: 5 days | Discharge: NURSING, REHAB OR LONG-TERM CARE | DRG: 710 | End: 2020-10-24
Attending: EMERGENCY MEDICINE | Admitting: INTERNAL MEDICINE
Payer: MEDICAID

## 2020-10-18 ENCOUNTER — APPOINTMENT (OUTPATIENT)
Dept: ULTRASOUND IMAGING | Age: 45
DRG: 710 | End: 2020-10-18
Attending: EMERGENCY MEDICINE
Payer: MEDICAID

## 2020-10-18 ENCOUNTER — APPOINTMENT (OUTPATIENT)
Dept: GENERAL RADIOLOGY | Age: 45
DRG: 710 | End: 2020-10-18
Attending: EMERGENCY MEDICINE
Payer: MEDICAID

## 2020-10-18 DIAGNOSIS — K65.1 ABSCESS OF ABDOMINAL CAVITY (HCC): ICD-10-CM

## 2020-10-18 DIAGNOSIS — K81.0 ACUTE CHOLECYSTITIS: ICD-10-CM

## 2020-10-18 DIAGNOSIS — A41.9 SEPSIS, DUE TO UNSPECIFIED ORGANISM, UNSPECIFIED WHETHER ACUTE ORGAN DYSFUNCTION PRESENT (HCC): Primary | ICD-10-CM

## 2020-10-18 DIAGNOSIS — K65.1 SUBPHRENIC ABSCESS (HCC): ICD-10-CM

## 2020-10-18 DIAGNOSIS — N17.9 ACUTE RENAL FAILURE, UNSPECIFIED ACUTE RENAL FAILURE TYPE (HCC): ICD-10-CM

## 2020-10-18 DIAGNOSIS — J18.9 PNEUMONIA OF RIGHT LOWER LOBE DUE TO INFECTIOUS ORGANISM: ICD-10-CM

## 2020-10-18 LAB
ALBUMIN SERPL-MCNC: 1.6 G/DL (ref 3.5–5)
ALBUMIN/GLOB SERPL: 0.3 {RATIO} (ref 1.2–3.5)
ALP SERPL-CCNC: 127 U/L (ref 50–136)
ALT SERPL-CCNC: 19 U/L (ref 12–65)
AMORPH CRY URNS QL MICRO: ABNORMAL
ANION GAP SERPL CALC-SCNC: 10 MMOL/L (ref 7–16)
APPEARANCE UR: ABNORMAL
APTT PPP: 35.1 SEC (ref 24.3–35.4)
AST SERPL-CCNC: 43 U/L (ref 15–37)
BACTERIA URNS QL MICRO: ABNORMAL /HPF
BASOPHILS # BLD: 0 K/UL (ref 0–0.2)
BASOPHILS NFR BLD: 0 % (ref 0–2)
BILIRUB SERPL-MCNC: 0.5 MG/DL (ref 0.2–1.1)
BILIRUB UR QL: ABNORMAL
BUN SERPL-MCNC: 73 MG/DL (ref 6–23)
CALCIUM SERPL-MCNC: 7.7 MG/DL (ref 8.3–10.4)
CASTS URNS QL MICRO: ABNORMAL /LPF
CHLORIDE SERPL-SCNC: 109 MMOL/L (ref 98–107)
CK SERPL-CCNC: 373 U/L (ref 21–215)
CO2 SERPL-SCNC: 23 MMOL/L (ref 21–32)
COLOR UR: ABNORMAL
CREAT SERPL-MCNC: 6.18 MG/DL (ref 0.8–1.5)
DIFFERENTIAL METHOD BLD: ABNORMAL
EOSINOPHIL # BLD: 0 K/UL (ref 0–0.8)
EOSINOPHIL NFR BLD: 0 % (ref 0.5–7.8)
ERYTHROCYTE [DISTWIDTH] IN BLOOD BY AUTOMATED COUNT: 15.3 % (ref 11.9–14.6)
GLOBULIN SER CALC-MCNC: 4.8 G/DL (ref 2.3–3.5)
GLUCOSE SERPL-MCNC: 82 MG/DL (ref 65–100)
GLUCOSE UR STRIP.AUTO-MCNC: 100 MG/DL
HCT VFR BLD AUTO: 27.5 % (ref 41.1–50.3)
HGB BLD-MCNC: 8.7 G/DL (ref 13.6–17.2)
HGB UR QL STRIP: ABNORMAL
IMM GRANULOCYTES # BLD AUTO: 0.1 K/UL (ref 0–0.5)
IMM GRANULOCYTES NFR BLD AUTO: 1 % (ref 0–5)
INR PPP: 1.1
KETONES UR QL STRIP.AUTO: NEGATIVE MG/DL
LACTATE SERPL-SCNC: 1.6 MMOL/L (ref 0.4–2)
LEUKOCYTE ESTERASE UR QL STRIP.AUTO: NEGATIVE
LIPASE SERPL-CCNC: 277 U/L (ref 73–393)
LYMPHOCYTES # BLD: 0.8 K/UL (ref 0.5–4.6)
LYMPHOCYTES NFR BLD: 6 % (ref 13–44)
MAGNESIUM SERPL-MCNC: 2.3 MG/DL (ref 1.8–2.4)
MCH RBC QN AUTO: 26.1 PG (ref 26.1–32.9)
MCHC RBC AUTO-ENTMCNC: 31.6 G/DL (ref 31.4–35)
MCV RBC AUTO: 82.6 FL (ref 79.6–97.8)
MONOCYTES # BLD: 0.7 K/UL (ref 0.1–1.3)
MONOCYTES NFR BLD: 5 % (ref 4–12)
NEUTS SEG # BLD: 11 K/UL (ref 1.7–8.2)
NEUTS SEG NFR BLD: 88 % (ref 43–78)
NITRITE UR QL STRIP.AUTO: NEGATIVE
NRBC # BLD: 0 K/UL (ref 0–0.2)
PH UR STRIP: 5.5 [PH] (ref 5–9)
PLATELET # BLD AUTO: 198 K/UL (ref 150–450)
PMV BLD AUTO: 12.9 FL (ref 9.4–12.3)
POTASSIUM SERPL-SCNC: 4.2 MMOL/L (ref 3.5–5.1)
PROCALCITONIN SERPL-MCNC: 114.66 NG/ML
PROT SERPL-MCNC: 6.4 G/DL (ref 6.3–8.2)
PROT UR STRIP-MCNC: NEGATIVE MG/DL
PROTHROMBIN TIME: 14.4 SEC (ref 12–14.7)
RBC # BLD AUTO: 3.33 M/UL (ref 4.23–5.6)
SODIUM SERPL-SCNC: 142 MMOL/L (ref 136–145)
SP GR UR REFRACTOMETRY: 1.03 (ref 1–1.02)
UROBILINOGEN UR QL STRIP.AUTO: 0.2 EU/DL (ref 0.2–1)
WBC # BLD AUTO: 12.5 K/UL (ref 4.3–11.1)
WBC URNS QL MICRO: ABNORMAL /HPF

## 2020-10-18 PROCEDURE — 74176 CT ABD & PELVIS W/O CONTRAST: CPT

## 2020-10-18 PROCEDURE — 83690 ASSAY OF LIPASE: CPT

## 2020-10-18 PROCEDURE — 96367 TX/PROPH/DG ADDL SEQ IV INF: CPT

## 2020-10-18 PROCEDURE — 93005 ELECTROCARDIOGRAM TRACING: CPT | Performed by: EMERGENCY MEDICINE

## 2020-10-18 PROCEDURE — 96375 TX/PRO/DX INJ NEW DRUG ADDON: CPT

## 2020-10-18 PROCEDURE — 74011000258 HC RX REV CODE- 258: Performed by: EMERGENCY MEDICINE

## 2020-10-18 PROCEDURE — 83605 ASSAY OF LACTIC ACID: CPT

## 2020-10-18 PROCEDURE — 71045 X-RAY EXAM CHEST 1 VIEW: CPT

## 2020-10-18 PROCEDURE — 86900 BLOOD TYPING SEROLOGIC ABO: CPT

## 2020-10-18 PROCEDURE — 96365 THER/PROPH/DIAG IV INF INIT: CPT

## 2020-10-18 PROCEDURE — 83036 HEMOGLOBIN GLYCOSYLATED A1C: CPT

## 2020-10-18 PROCEDURE — 85730 THROMBOPLASTIN TIME PARTIAL: CPT

## 2020-10-18 PROCEDURE — 80053 COMPREHEN METABOLIC PANEL: CPT

## 2020-10-18 PROCEDURE — 74011250636 HC RX REV CODE- 250/636: Performed by: EMERGENCY MEDICINE

## 2020-10-18 PROCEDURE — 99285 EMERGENCY DEPT VISIT HI MDM: CPT

## 2020-10-18 PROCEDURE — 83735 ASSAY OF MAGNESIUM: CPT

## 2020-10-18 PROCEDURE — 84145 PROCALCITONIN (PCT): CPT

## 2020-10-18 PROCEDURE — 87040 BLOOD CULTURE FOR BACTERIA: CPT

## 2020-10-18 PROCEDURE — 84443 ASSAY THYROID STIM HORMONE: CPT

## 2020-10-18 PROCEDURE — 85025 COMPLETE CBC W/AUTO DIFF WBC: CPT

## 2020-10-18 PROCEDURE — 96366 THER/PROPH/DIAG IV INF ADDON: CPT

## 2020-10-18 PROCEDURE — 82550 ASSAY OF CK (CPK): CPT

## 2020-10-18 PROCEDURE — 85610 PROTHROMBIN TIME: CPT

## 2020-10-18 PROCEDURE — 87086 URINE CULTURE/COLONY COUNT: CPT

## 2020-10-18 PROCEDURE — 81003 URINALYSIS AUTO W/O SCOPE: CPT

## 2020-10-18 PROCEDURE — 76705 ECHO EXAM OF ABDOMEN: CPT

## 2020-10-18 RX ORDER — VANCOMYCIN 1.75 GRAM/500 ML IN 0.9 % SODIUM CHLORIDE INTRAVENOUS
1750 ONCE
Status: COMPLETED | OUTPATIENT
Start: 2020-10-18 | End: 2020-10-19

## 2020-10-18 RX ADMIN — SODIUM CHLORIDE 1000 ML: 900 INJECTION, SOLUTION INTRAVENOUS at 21:45

## 2020-10-18 RX ADMIN — SODIUM CHLORIDE 1000 ML: 900 INJECTION, SOLUTION INTRAVENOUS at 20:26

## 2020-10-18 RX ADMIN — SODIUM CHLORIDE 1000 ML: 900 INJECTION, SOLUTION INTRAVENOUS at 23:29

## 2020-10-18 RX ADMIN — PIPERACILLIN SODIUM AND TAZOBACTAM SODIUM 4.5 G: 4; .5 INJECTION, POWDER, LYOPHILIZED, FOR SOLUTION INTRAVENOUS at 20:27

## 2020-10-18 RX ADMIN — VANCOMYCIN HYDROCHLORIDE 1750 MG: 10 INJECTION, POWDER, LYOPHILIZED, FOR SOLUTION INTRAVENOUS at 21:44

## 2020-10-18 NOTE — ED TRIAGE NOTES
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 EMS was called for 3 days of abdominal pain and diarrhea. Poor appetite with no PO intake for 2 days, now experiencing lethargy. With EMS- , RR 30, temp 101.3, /67, sepsis alert called. Blood Cultures drawn in field x 1 set, no antibiotics administered due to low supply. 18G RFA placed-  received 600 ml NS so far, .

## 2020-10-19 ENCOUNTER — ANESTHESIA EVENT (OUTPATIENT)
Dept: SURGERY | Age: 45
DRG: 710 | End: 2020-10-19
Payer: MEDICAID

## 2020-10-19 ENCOUNTER — APPOINTMENT (OUTPATIENT)
Dept: ULTRASOUND IMAGING | Age: 45
DRG: 710 | End: 2020-10-19
Attending: INTERNAL MEDICINE
Payer: MEDICAID

## 2020-10-19 ENCOUNTER — ANESTHESIA (OUTPATIENT)
Dept: SURGERY | Age: 45
DRG: 710 | End: 2020-10-19
Payer: MEDICAID

## 2020-10-19 PROBLEM — Z86.73 HISTORY OF CVA (CEREBROVASCULAR ACCIDENT): Status: ACTIVE | Noted: 2020-10-19

## 2020-10-19 PROBLEM — G81.94 LEFT HEMIPARESIS (HCC): Status: ACTIVE | Noted: 2020-10-19

## 2020-10-19 PROBLEM — R00.0 TACHYCARDIA: Status: ACTIVE | Noted: 2020-10-19

## 2020-10-19 PROBLEM — K65.1 ABSCESS OF ABDOMINAL CAVITY (HCC): Status: ACTIVE | Noted: 2020-10-19

## 2020-10-19 PROBLEM — A41.9 SEPSIS (HCC): Status: ACTIVE | Noted: 2020-10-19

## 2020-10-19 PROBLEM — J18.9 RLL PNEUMONIA: Status: ACTIVE | Noted: 2020-10-19

## 2020-10-19 PROBLEM — N17.9 AKI (ACUTE KIDNEY INJURY) (HCC): Status: ACTIVE | Noted: 2020-10-19

## 2020-10-19 LAB
ABO + RH BLD: NORMAL
ALBUMIN SERPL-MCNC: 1.7 G/DL (ref 3.5–5)
ALBUMIN/GLOB SERPL: 0.3 {RATIO} (ref 1.2–3.5)
ALP SERPL-CCNC: 151 U/L (ref 50–136)
ALT SERPL-CCNC: 19 U/L (ref 12–65)
ANION GAP SERPL CALC-SCNC: 13 MMOL/L (ref 7–16)
AST SERPL-CCNC: 46 U/L (ref 15–37)
ATRIAL RATE: 107 BPM
BACTERIA SPEC CULT: NORMAL
BASOPHILS # BLD: 0 K/UL (ref 0–0.2)
BASOPHILS NFR BLD: 0 % (ref 0–2)
BILIRUB SERPL-MCNC: 0.6 MG/DL (ref 0.2–1.1)
BLOOD GROUP ANTIBODIES SERPL: NORMAL
BUN SERPL-MCNC: 62 MG/DL (ref 6–23)
CALCIUM SERPL-MCNC: 8.4 MG/DL (ref 8.3–10.4)
CALCULATED P AXIS, ECG09: 58 DEGREES
CALCULATED R AXIS, ECG10: 31 DEGREES
CALCULATED T AXIS, ECG11: 34 DEGREES
CHLORIDE SERPL-SCNC: 112 MMOL/L (ref 98–107)
CO2 SERPL-SCNC: 19 MMOL/L (ref 21–32)
CREAT SERPL-MCNC: 4.81 MG/DL (ref 0.8–1.5)
DIAGNOSIS, 93000: NORMAL
DIFFERENTIAL METHOD BLD: ABNORMAL
EOSINOPHIL # BLD: 0 K/UL (ref 0–0.8)
EOSINOPHIL NFR BLD: 0 % (ref 0.5–7.8)
ERYTHROCYTE [DISTWIDTH] IN BLOOD BY AUTOMATED COUNT: 15.4 % (ref 11.9–14.6)
EST. AVERAGE GLUCOSE BLD GHB EST-MCNC: 117 MG/DL
GLOBULIN SER CALC-MCNC: 5.3 G/DL (ref 2.3–3.5)
GLUCOSE BLD STRIP.AUTO-MCNC: 109 MG/DL (ref 65–100)
GLUCOSE BLD STRIP.AUTO-MCNC: 125 MG/DL (ref 65–100)
GLUCOSE BLD STRIP.AUTO-MCNC: 66 MG/DL (ref 65–100)
GLUCOSE BLD STRIP.AUTO-MCNC: 72 MG/DL (ref 65–100)
GLUCOSE BLD STRIP.AUTO-MCNC: 91 MG/DL (ref 65–100)
GLUCOSE SERPL-MCNC: 63 MG/DL (ref 65–100)
HBA1C MFR BLD: 5.7 % (ref 4.8–6)
HCT VFR BLD AUTO: 33.3 % (ref 41.1–50.3)
HGB BLD-MCNC: 10.5 G/DL (ref 13.6–17.2)
IMM GRANULOCYTES # BLD AUTO: 0.1 K/UL (ref 0–0.5)
IMM GRANULOCYTES NFR BLD AUTO: 1 % (ref 0–5)
LYMPHOCYTES # BLD: 0.6 K/UL (ref 0.5–4.6)
LYMPHOCYTES NFR BLD: 6 % (ref 13–44)
MCH RBC QN AUTO: 26.1 PG (ref 26.1–32.9)
MCHC RBC AUTO-ENTMCNC: 31.5 G/DL (ref 31.4–35)
MCV RBC AUTO: 82.8 FL (ref 79.6–97.8)
MONOCYTES # BLD: 0.5 K/UL (ref 0.1–1.3)
MONOCYTES NFR BLD: 5 % (ref 4–12)
NEUTS SEG # BLD: 8.6 K/UL (ref 1.7–8.2)
NEUTS SEG NFR BLD: 88 % (ref 43–78)
NRBC # BLD: 0 K/UL (ref 0–0.2)
P-R INTERVAL, ECG05: 138 MS
PLATELET # BLD AUTO: 191 K/UL (ref 150–450)
PLATELET COMMENTS,PCOM: ABNORMAL
PMV BLD AUTO: 12.5 FL (ref 9.4–12.3)
POTASSIUM SERPL-SCNC: 4.3 MMOL/L (ref 3.5–5.1)
PROT SERPL-MCNC: 7 G/DL (ref 6.3–8.2)
Q-T INTERVAL, ECG07: 354 MS
QRS DURATION, ECG06: 76 MS
QTC CALCULATION (BEZET), ECG08: 472 MS
RBC # BLD AUTO: 4.02 M/UL (ref 4.23–5.6)
RBC MORPH BLD: ABNORMAL
SERVICE CMNT-IMP: NORMAL
SODIUM SERPL-SCNC: 144 MMOL/L (ref 136–145)
SPECIMEN EXP DATE BLD: NORMAL
TSH SERPL DL<=0.005 MIU/L-ACNC: <0.005 UIU/ML (ref 0.36–3.74)
VANCOMYCIN SERPL-MCNC: 14.2 UG/ML
VENTRICULAR RATE, ECG03: 107 BPM
WBC # BLD AUTO: 9.8 K/UL (ref 4.3–11.1)
WBC MORPH BLD: ABNORMAL

## 2020-10-19 PROCEDURE — 47600 CHOLECYSTECTOMY: CPT | Performed by: SURGERY

## 2020-10-19 PROCEDURE — 36415 COLL VENOUS BLD VENIPUNCTURE: CPT

## 2020-10-19 PROCEDURE — 77030011264 HC ELECTRD BLD EXT COVD -A: Performed by: SURGERY

## 2020-10-19 PROCEDURE — 74011000250 HC RX REV CODE- 250: Performed by: NURSE ANESTHETIST, CERTIFIED REGISTERED

## 2020-10-19 PROCEDURE — 87046 STOOL CULTR AEROBIC BACT EA: CPT

## 2020-10-19 PROCEDURE — 47600 CHOLECYSTECTOMY: CPT | Performed by: NURSE PRACTITIONER

## 2020-10-19 PROCEDURE — 74011000258 HC RX REV CODE- 258: Performed by: INTERNAL MEDICINE

## 2020-10-19 PROCEDURE — 87493 C DIFF AMPLIFIED PROBE: CPT

## 2020-10-19 PROCEDURE — 74011000250 HC RX REV CODE- 250: Performed by: SURGERY

## 2020-10-19 PROCEDURE — 77030040506 HC DRN WND MDII -A: Performed by: SURGERY

## 2020-10-19 PROCEDURE — 76010000172 HC OR TIME 2.5 TO 3 HR INTENSV-TIER 1: Performed by: SURGERY

## 2020-10-19 PROCEDURE — 77030003575 HC NDL INSUF ENDOPTH J&J -B: Performed by: SURGERY

## 2020-10-19 PROCEDURE — 74011250636 HC RX REV CODE- 250/636: Performed by: NURSE ANESTHETIST, CERTIFIED REGISTERED

## 2020-10-19 PROCEDURE — 80202 ASSAY OF VANCOMYCIN: CPT

## 2020-10-19 PROCEDURE — 77030021158 HC TRCR BLN GELPRT AMR -B: Performed by: SURGERY

## 2020-10-19 PROCEDURE — 77030008756 HC TU IRR SUC STRY -B: Performed by: SURGERY

## 2020-10-19 PROCEDURE — 82962 GLUCOSE BLOOD TEST: CPT

## 2020-10-19 PROCEDURE — 93005 ELECTROCARDIOGRAM TRACING: CPT | Performed by: INTERNAL MEDICINE

## 2020-10-19 PROCEDURE — P9045 ALBUMIN (HUMAN), 5%, 250 ML: HCPCS | Performed by: NURSE ANESTHETIST, CERTIFIED REGISTERED

## 2020-10-19 PROCEDURE — 76060000036 HC ANESTHESIA 2.5 TO 3 HR: Performed by: SURGERY

## 2020-10-19 PROCEDURE — 77030031139 HC SUT VCRL2 J&J -A: Performed by: SURGERY

## 2020-10-19 PROCEDURE — 74011250636 HC RX REV CODE- 250/636: Performed by: ANESTHESIOLOGY

## 2020-10-19 PROCEDURE — 77030008518 HC TBNG INSUF ENDO STRY -B: Performed by: SURGERY

## 2020-10-19 PROCEDURE — 80053 COMPREHEN METABOLIC PANEL: CPT

## 2020-10-19 PROCEDURE — 85025 COMPLETE CBC W/AUTO DIFF WBC: CPT

## 2020-10-19 PROCEDURE — 77030002966 HC SUT PDS J&J -A: Performed by: SURGERY

## 2020-10-19 PROCEDURE — 0W9G00Z DRAINAGE OF PERITONEAL CAVITY WITH DRAINAGE DEVICE, OPEN APPROACH: ICD-10-PCS | Performed by: SURGERY

## 2020-10-19 PROCEDURE — 76210000006 HC OR PH I REC 0.5 TO 1 HR: Performed by: SURGERY

## 2020-10-19 PROCEDURE — 77030000038 HC TIP SCIS LAPSCP SURI -B: Performed by: SURGERY

## 2020-10-19 PROCEDURE — 87427 SHIGA-LIKE TOXIN AG IA: CPT

## 2020-10-19 PROCEDURE — 77030010513 HC APPL CLP LIG J&J -C: Performed by: SURGERY

## 2020-10-19 PROCEDURE — 77030009403 HC ELECTRD ENDO MEGA -B: Performed by: SURGERY

## 2020-10-19 PROCEDURE — 77030016151 HC PROTCTR LNS DFOG COVD -B: Performed by: SURGERY

## 2020-10-19 PROCEDURE — 2709999900 HC NON-CHARGEABLE SUPPLY: Performed by: SURGERY

## 2020-10-19 PROCEDURE — 77030037088 HC TUBE ENDOTRACH ORAL NSL COVD-A: Performed by: NURSE ANESTHETIST, CERTIFIED REGISTERED

## 2020-10-19 PROCEDURE — 65270000029 HC RM PRIVATE

## 2020-10-19 PROCEDURE — 74011250636 HC RX REV CODE- 250/636: Performed by: INTERNAL MEDICINE

## 2020-10-19 PROCEDURE — 77030020829: Performed by: SURGERY

## 2020-10-19 PROCEDURE — 87040 BLOOD CULTURE FOR BACTERIA: CPT

## 2020-10-19 PROCEDURE — 77030039425 HC BLD LARYNG TRULITE DISP TELE -A: Performed by: NURSE ANESTHETIST, CERTIFIED REGISTERED

## 2020-10-19 PROCEDURE — 2709999900 HC NON-CHARGEABLE SUPPLY

## 2020-10-19 PROCEDURE — 77030008522 HC TBNG INSUF LAPRO STRY -B: Performed by: SURGERY

## 2020-10-19 PROCEDURE — 77030019908 HC STETH ESOPH SIMS -A: Performed by: NURSE ANESTHETIST, CERTIFIED REGISTERED

## 2020-10-19 PROCEDURE — 0FJ44ZZ INSPECTION OF GALLBLADDER, PERCUTANEOUS ENDOSCOPIC APPROACH: ICD-10-PCS | Performed by: SURGERY

## 2020-10-19 PROCEDURE — 87045 FECES CULTURE AEROBIC BACT: CPT

## 2020-10-19 PROCEDURE — 77030040922 HC BLNKT HYPOTHRM STRY -A: Performed by: NURSE ANESTHETIST, CERTIFIED REGISTERED

## 2020-10-19 PROCEDURE — 76770 US EXAM ABDO BACK WALL COMP: CPT

## 2020-10-19 PROCEDURE — 77030008462 HC STPLR SKN PROX J&J -A: Performed by: SURGERY

## 2020-10-19 PROCEDURE — 77030014008 HC SPNG HEMSTAT J&J -C: Performed by: SURGERY

## 2020-10-19 PROCEDURE — 77030002996 HC SUT SLK J&J -A: Performed by: SURGERY

## 2020-10-19 PROCEDURE — 88304 TISSUE EXAM BY PATHOLOGIST: CPT

## 2020-10-19 PROCEDURE — 77030008606 HC TRCR ENDOSC KII AMR -B: Performed by: SURGERY

## 2020-10-19 PROCEDURE — 77030037892: Performed by: SURGERY

## 2020-10-19 PROCEDURE — 74011250636 HC RX REV CODE- 250/636: Performed by: SURGERY

## 2020-10-19 PROCEDURE — 0FT40ZZ RESECTION OF GALLBLADDER, OPEN APPROACH: ICD-10-PCS | Performed by: SURGERY

## 2020-10-19 PROCEDURE — 77030040361 HC SLV COMPR DVT MDII -B: Performed by: SURGERY

## 2020-10-19 PROCEDURE — 74011250637 HC RX REV CODE- 250/637: Performed by: SURGERY

## 2020-10-19 RX ORDER — OXYCODONE AND ACETAMINOPHEN 5; 325 MG/1; MG/1
1 TABLET ORAL AS NEEDED
Status: DISCONTINUED | OUTPATIENT
Start: 2020-10-19 | End: 2020-10-19 | Stop reason: HOSPADM

## 2020-10-19 RX ORDER — ACETAMINOPHEN 325 MG/1
650 TABLET ORAL
Status: DISCONTINUED | OUTPATIENT
Start: 2020-10-19 | End: 2020-10-24 | Stop reason: HOSPADM

## 2020-10-19 RX ORDER — GLYCOPYRROLATE 0.2 MG/ML
INJECTION INTRAMUSCULAR; INTRAVENOUS AS NEEDED
Status: DISCONTINUED | OUTPATIENT
Start: 2020-10-19 | End: 2020-10-19 | Stop reason: HOSPADM

## 2020-10-19 RX ORDER — NEOSTIGMINE METHYLSULFATE 1 MG/ML
INJECTION, SOLUTION INTRAVENOUS AS NEEDED
Status: DISCONTINUED | OUTPATIENT
Start: 2020-10-19 | End: 2020-10-19 | Stop reason: HOSPADM

## 2020-10-19 RX ORDER — BUPIVACAINE HYDROCHLORIDE 2.5 MG/ML
INJECTION, SOLUTION EPIDURAL; INFILTRATION; INTRACAUDAL AS NEEDED
Status: DISCONTINUED | OUTPATIENT
Start: 2020-10-19 | End: 2020-10-19 | Stop reason: HOSPADM

## 2020-10-19 RX ORDER — FENTANYL CITRATE 50 UG/ML
INJECTION, SOLUTION INTRAMUSCULAR; INTRAVENOUS AS NEEDED
Status: DISCONTINUED | OUTPATIENT
Start: 2020-10-19 | End: 2020-10-19 | Stop reason: HOSPADM

## 2020-10-19 RX ORDER — ATORVASTATIN CALCIUM 80 MG/1
80 TABLET, FILM COATED ORAL
Status: DISCONTINUED | OUTPATIENT
Start: 2020-10-19 | End: 2020-10-24 | Stop reason: HOSPADM

## 2020-10-19 RX ORDER — CEFAZOLIN SODIUM/WATER 2 G/20 ML
SYRINGE (ML) INTRAVENOUS AS NEEDED
Status: DISCONTINUED | OUTPATIENT
Start: 2020-10-19 | End: 2020-10-19 | Stop reason: HOSPADM

## 2020-10-19 RX ORDER — SODIUM CHLORIDE 0.9 % (FLUSH) 0.9 %
5-40 SYRINGE (ML) INJECTION AS NEEDED
Status: DISCONTINUED | OUTPATIENT
Start: 2020-10-19 | End: 2020-10-19 | Stop reason: HOSPADM

## 2020-10-19 RX ORDER — VANCOMYCIN HYDROCHLORIDE
1250 ONCE
Status: COMPLETED | OUTPATIENT
Start: 2020-10-19 | End: 2020-10-20

## 2020-10-19 RX ORDER — PROPOFOL 10 MG/ML
INJECTION, EMULSION INTRAVENOUS AS NEEDED
Status: DISCONTINUED | OUTPATIENT
Start: 2020-10-19 | End: 2020-10-19 | Stop reason: HOSPADM

## 2020-10-19 RX ORDER — SODIUM CHLORIDE 0.9 % (FLUSH) 0.9 %
5-40 SYRINGE (ML) INJECTION EVERY 8 HOURS
Status: DISCONTINUED | OUTPATIENT
Start: 2020-10-19 | End: 2020-10-19 | Stop reason: HOSPADM

## 2020-10-19 RX ORDER — SODIUM CHLORIDE 0.9 % (FLUSH) 0.9 %
5-40 SYRINGE (ML) INJECTION EVERY 8 HOURS
Status: DISCONTINUED | OUTPATIENT
Start: 2020-10-19 | End: 2020-10-24 | Stop reason: HOSPADM

## 2020-10-19 RX ORDER — MIDAZOLAM HYDROCHLORIDE 1 MG/ML
2 INJECTION, SOLUTION INTRAMUSCULAR; INTRAVENOUS
Status: DISCONTINUED | OUTPATIENT
Start: 2020-10-19 | End: 2020-10-19 | Stop reason: HOSPADM

## 2020-10-19 RX ORDER — EPHEDRINE SULFATE/0.9% NACL/PF 50 MG/5 ML
SYRINGE (ML) INTRAVENOUS AS NEEDED
Status: DISCONTINUED | OUTPATIENT
Start: 2020-10-19 | End: 2020-10-19 | Stop reason: HOSPADM

## 2020-10-19 RX ORDER — ALBUMIN HUMAN 50 G/1000ML
SOLUTION INTRAVENOUS AS NEEDED
Status: DISCONTINUED | OUTPATIENT
Start: 2020-10-19 | End: 2020-10-19 | Stop reason: HOSPADM

## 2020-10-19 RX ORDER — HYDROMORPHONE HYDROCHLORIDE 2 MG/ML
0.5 INJECTION, SOLUTION INTRAMUSCULAR; INTRAVENOUS; SUBCUTANEOUS
Status: DISCONTINUED | OUTPATIENT
Start: 2020-10-19 | End: 2020-10-19 | Stop reason: HOSPADM

## 2020-10-19 RX ORDER — SENNOSIDES 8.6 MG/1
1 TABLET ORAL DAILY PRN
Status: DISCONTINUED | OUTPATIENT
Start: 2020-10-19 | End: 2020-10-24 | Stop reason: HOSPADM

## 2020-10-19 RX ORDER — ACETAMINOPHEN 650 MG/1
650 SUPPOSITORY RECTAL
Status: DISCONTINUED | OUTPATIENT
Start: 2020-10-19 | End: 2020-10-24 | Stop reason: HOSPADM

## 2020-10-19 RX ORDER — SODIUM CHLORIDE, SODIUM LACTATE, POTASSIUM CHLORIDE, CALCIUM CHLORIDE 600; 310; 30; 20 MG/100ML; MG/100ML; MG/100ML; MG/100ML
50 INJECTION, SOLUTION INTRAVENOUS CONTINUOUS
Status: DISCONTINUED | OUTPATIENT
Start: 2020-10-19 | End: 2020-10-21

## 2020-10-19 RX ORDER — ROCURONIUM BROMIDE 10 MG/ML
INJECTION, SOLUTION INTRAVENOUS AS NEEDED
Status: DISCONTINUED | OUTPATIENT
Start: 2020-10-19 | End: 2020-10-19 | Stop reason: HOSPADM

## 2020-10-19 RX ORDER — SODIUM CHLORIDE 0.9 % (FLUSH) 0.9 %
5-40 SYRINGE (ML) INJECTION AS NEEDED
Status: DISCONTINUED | OUTPATIENT
Start: 2020-10-19 | End: 2020-10-24 | Stop reason: HOSPADM

## 2020-10-19 RX ORDER — NALOXONE HYDROCHLORIDE 0.4 MG/ML
0.2 INJECTION, SOLUTION INTRAMUSCULAR; INTRAVENOUS; SUBCUTANEOUS AS NEEDED
Status: DISCONTINUED | OUTPATIENT
Start: 2020-10-19 | End: 2020-10-19 | Stop reason: HOSPADM

## 2020-10-19 RX ORDER — SODIUM CHLORIDE, SODIUM LACTATE, POTASSIUM CHLORIDE, CALCIUM CHLORIDE 600; 310; 30; 20 MG/100ML; MG/100ML; MG/100ML; MG/100ML
75 INJECTION, SOLUTION INTRAVENOUS CONTINUOUS
Status: DISCONTINUED | OUTPATIENT
Start: 2020-10-19 | End: 2020-10-19 | Stop reason: HOSPADM

## 2020-10-19 RX ORDER — LIDOCAINE HYDROCHLORIDE 10 MG/ML
0.1 INJECTION INFILTRATION; PERINEURAL AS NEEDED
Status: DISCONTINUED | OUTPATIENT
Start: 2020-10-19 | End: 2020-10-19 | Stop reason: HOSPADM

## 2020-10-19 RX ORDER — LIDOCAINE HYDROCHLORIDE 20 MG/ML
INJECTION, SOLUTION EPIDURAL; INFILTRATION; INTRACAUDAL; PERINEURAL AS NEEDED
Status: DISCONTINUED | OUTPATIENT
Start: 2020-10-19 | End: 2020-10-19 | Stop reason: HOSPADM

## 2020-10-19 RX ORDER — ONDANSETRON 2 MG/ML
INJECTION INTRAMUSCULAR; INTRAVENOUS AS NEEDED
Status: DISCONTINUED | OUTPATIENT
Start: 2020-10-19 | End: 2020-10-19 | Stop reason: HOSPADM

## 2020-10-19 RX ORDER — LABETALOL HYDROCHLORIDE 5 MG/ML
10 INJECTION, SOLUTION INTRAVENOUS
Status: DISCONTINUED | OUTPATIENT
Start: 2020-10-19 | End: 2020-10-20

## 2020-10-19 RX ORDER — HYDROMORPHONE HYDROCHLORIDE 1 MG/ML
1 INJECTION, SOLUTION INTRAMUSCULAR; INTRAVENOUS; SUBCUTANEOUS
Status: DISCONTINUED | OUTPATIENT
Start: 2020-10-19 | End: 2020-10-20

## 2020-10-19 RX ORDER — INSULIN LISPRO 100 [IU]/ML
INJECTION, SOLUTION INTRAVENOUS; SUBCUTANEOUS EVERY 6 HOURS
Status: DISCONTINUED | OUTPATIENT
Start: 2020-10-19 | End: 2020-10-24 | Stop reason: HOSPADM

## 2020-10-19 RX ADMIN — Medication 2 G: at 15:35

## 2020-10-19 RX ADMIN — CEFEPIME HYDROCHLORIDE 1 G: 1 INJECTION, POWDER, FOR SOLUTION INTRAMUSCULAR; INTRAVENOUS at 09:37

## 2020-10-19 RX ADMIN — HYDROMORPHONE HYDROCHLORIDE 1 MG: 1 INJECTION, SOLUTION INTRAMUSCULAR; INTRAVENOUS; SUBCUTANEOUS at 21:17

## 2020-10-19 RX ADMIN — ONDANSETRON 4 MG: 2 INJECTION INTRAMUSCULAR; INTRAVENOUS at 16:56

## 2020-10-19 RX ADMIN — AZITHROMYCIN 500 MG: 500 INJECTION, POWDER, LYOPHILIZED, FOR SOLUTION INTRAVENOUS at 02:20

## 2020-10-19 RX ADMIN — PHENYLEPHRINE HYDROCHLORIDE 60 MCG: 10 INJECTION INTRAVENOUS at 15:31

## 2020-10-19 RX ADMIN — PROPOFOL 200 MG: 10 INJECTION, EMULSION INTRAVENOUS at 15:24

## 2020-10-19 RX ADMIN — Medication 10 MG: at 15:50

## 2020-10-19 RX ADMIN — LIDOCAINE HYDROCHLORIDE 60 MG: 20 INJECTION, SOLUTION EPIDURAL; INFILTRATION; INTRACAUDAL; PERINEURAL at 15:24

## 2020-10-19 RX ADMIN — PHENYLEPHRINE HYDROCHLORIDE 120 MCG: 10 INJECTION INTRAVENOUS at 15:37

## 2020-10-19 RX ADMIN — PHENYLEPHRINE HYDROCHLORIDE 240 MCG: 10 INJECTION INTRAVENOUS at 15:50

## 2020-10-19 RX ADMIN — Medication 5 MG: at 17:23

## 2020-10-19 RX ADMIN — VANCOMYCIN HYDROCHLORIDE 1250 MG: 10 INJECTION, POWDER, LYOPHILIZED, FOR SOLUTION INTRAVENOUS at 23:39

## 2020-10-19 RX ADMIN — SODIUM CHLORIDE, SODIUM LACTATE, POTASSIUM CHLORIDE, AND CALCIUM CHLORIDE 125 ML/HR: 600; 310; 30; 20 INJECTION, SOLUTION INTRAVENOUS at 02:20

## 2020-10-19 RX ADMIN — PHENYLEPHRINE HYDROCHLORIDE 40 MCG/MIN: 10 INJECTION INTRAVENOUS at 15:38

## 2020-10-19 RX ADMIN — SUGAMMADEX 200 MG: 100 INJECTION, SOLUTION INTRAVENOUS at 17:33

## 2020-10-19 RX ADMIN — Medication 10 ML: at 21:17

## 2020-10-19 RX ADMIN — ROCURONIUM BROMIDE 50 MG: 10 INJECTION, SOLUTION INTRAVENOUS at 15:24

## 2020-10-19 RX ADMIN — ATORVASTATIN CALCIUM 80 MG: 80 TABLET, FILM COATED ORAL at 21:17

## 2020-10-19 RX ADMIN — ALBUMIN HUMAN 250 ML: 0.05 INJECTION, SOLUTION INTRAVENOUS at 15:57

## 2020-10-19 RX ADMIN — Medication 10 ML: at 06:00

## 2020-10-19 RX ADMIN — SODIUM CHLORIDE, SODIUM LACTATE, POTASSIUM CHLORIDE, AND CALCIUM CHLORIDE: 600; 310; 30; 20 INJECTION, SOLUTION INTRAVENOUS at 16:25

## 2020-10-19 RX ADMIN — ROCURONIUM BROMIDE 10 MG: 10 INJECTION, SOLUTION INTRAVENOUS at 16:48

## 2020-10-19 RX ADMIN — FENTANYL CITRATE 50 MCG: 50 INJECTION INTRAMUSCULAR; INTRAVENOUS at 16:16

## 2020-10-19 RX ADMIN — SODIUM CHLORIDE, SODIUM LACTATE, POTASSIUM CHLORIDE, AND CALCIUM CHLORIDE 75 ML/HR: 600; 310; 30; 20 INJECTION, SOLUTION INTRAVENOUS at 14:57

## 2020-10-19 RX ADMIN — PHENYLEPHRINE HYDROCHLORIDE 60 MCG: 10 INJECTION INTRAVENOUS at 15:29

## 2020-10-19 RX ADMIN — PHENYLEPHRINE HYDROCHLORIDE 240 MCG: 10 INJECTION INTRAVENOUS at 15:45

## 2020-10-19 RX ADMIN — ALBUMIN HUMAN 250 ML: 0.05 INJECTION, SOLUTION INTRAVENOUS at 16:06

## 2020-10-19 RX ADMIN — FENTANYL CITRATE 100 MCG: 50 INJECTION INTRAMUSCULAR; INTRAVENOUS at 15:24

## 2020-10-19 RX ADMIN — PHENYLEPHRINE HYDROCHLORIDE 120 MCG: 10 INJECTION INTRAVENOUS at 15:34

## 2020-10-19 RX ADMIN — GLYCOPYRROLATE 0.7 MG: 0.2 INJECTION, SOLUTION INTRAMUSCULAR; INTRAVENOUS at 17:23

## 2020-10-19 RX ADMIN — ROCURONIUM BROMIDE 10 MG: 10 INJECTION, SOLUTION INTRAVENOUS at 16:00

## 2020-10-19 RX ADMIN — ROCURONIUM BROMIDE 10 MG: 10 INJECTION, SOLUTION INTRAVENOUS at 16:10

## 2020-10-19 NOTE — BRIEF OP NOTE
Brief Postoperative Note    Patient: Adeel Arias. YOB: 1975  MRN: [id number]    Date of Procedure: 10/19/2020     Pre-Op Diagnosis: Cholecystitis [K81.9]    Post-Op Diagnosis: acute gangrenous cholecystitis with abscess     Procedure(s):  ATEMPTED LAPAROSCOPIC CHOLECYSTECTOMY CONVERTED TO OPEN  Drainage of perihepatic abscess    Surgeon(s): Carey Corrigan MD    Surgical Assistant: Nurse Practitioner: Colt Olson NP    Anesthesia: General     Estimated Blood Loss (mL): less than 50     Complications: None    Specimens:   ID Type Source Tests Collected by Time Destination   1 : Gallbladder Preservative Gallbladder  Carey Corrigan MD 10/19/2020  4:09 PM Pathology        Implants: * No implants in log *    Drains:   Aleksandra Bohr #1 10/19/20 Right Abdomen (Active)   Site Assessment Clean, dry, & intact 10/19/20 1744   Dressing Status Clean, dry, & intact 10/19/20 1744   Drainage Description Sanguinous 10/19/20 1744   Status Draining;Patent; Charged 10/19/20 1744       Findings: near total gangrenous gallbladder with necrosis extending into the cystic duct and large amount of cloudy ascites around it.      Electronically Signed by Mely Dyer MD on 10/19/2020 at 6:20 PM

## 2020-10-19 NOTE — PROGRESS NOTES
SPEECH PATHOLOGY NOTE:    Speech therapy consult received and appreciated. Per hospitalist, hold bedside swallowing evaluation/po trials until patient is seen by general surgery for abdominal abscess. Will continue to follow.       Batool Kim MS, CCC-SLP

## 2020-10-19 NOTE — H&P
Hospitalist Note     Admit Date:  10/18/2020  7:55 PM   Name:  Onur Lott Age:  50 y.o.  :  1975   MRN:  [id number]   PCP:  Pascale Solis MD  Treatment Team: Primary Nurse: Claudette Nava RN; Primary Nurse: Manda Blackburn; Consulting Provider: Alma Raymond MD      Chief complaint: Abdominal pain, vomiting and fever. HPI/Subjective:     13-year-old male with significant past medical history of stroke with left residual hemiparesis, hypertension, PFO [closed now], diabetes mellitus [although patient denies any history of diabetes] transferred from nursing home because of abdominal pain and vomiting. Patient is AO x3 and able to provide history. As per patient he started having abdominal pain 4 days ago. His pain is in the right upper quadrant ongoing from last 4 days, progressively getting worse, sharp. No aggravating or relieving factors. Currently severe. Associated with nausea and vomiting. As per patient he is having at least couple of vomiting from last 2 days. To me he denies any diarrhea. He denies any chest pain, shortness of breath, urinary complaints or pain anywhere else. As per patient he is taking aspirin, Plavix statin and lisinopril at nursing home. Past medical history, past surgical history, home medication, emergency room course, social history and family history reviewed. In the emergency room, patient found to have intra-abdominal abscess with sepsis. General surgery was consulted who reviewed the images and recommended no surgery at present. Surgeon recommended admission to medicine service and they will evaluate the patient. Medicine service consulted to admit the patient. Also patient received vancomycin, Zosyn and IV fluid in the emergency room. Rest review of systems reviewed and negative except as noted in HPI.   Past Medical History:   Diagnosis Date    Acute ischemic stroke ([facility] 2019    Acute pancreatitis 2014    Cerebrovascular accident (CVA) due to embolism ([facility] 2019    [facility] 2002    Diabetes ([facility]     Diabetes mellitus     Hypertension       Past Surgical History:   Procedure Laterality Date    HX HERNIA REPAIR      HX ORTHOPAEDIC      carpal tunnel surgery      No Known Allergies   Social History     Tobacco Use    Smoking status: Former Smoker     Packs/day: 0.25     Types: Cigarettes     Last attempt to quit: 2019     Years since quittin.8    Smokeless tobacco: Former User     Types: Chew   Substance Use Topics    Alcohol use: No      Family History   Problem Relation Age of Onset    Diabetes Mother     Diabetes Father       Family history reviewed and noncontributory. Immunization History   Administered Date(s) Administered    Influenza Vaccine 10/16/2013, 2014    TB Skin Test (PPD) Intradermal 2019, 2020    TD Vaccine 1988    Td 10/05/2012     PTA Medications:  Prior to Admission Medications   Prescriptions Last Dose Informant Patient Reported? Taking?   acetaminophen (TYLENOL) 325 mg tablet   Yes Yes   Sig: Take  by mouth every four (4) hours as needed for Pain. aspirin 81 mg chewable tablet   Yes Yes   Sig: Take 1 Tab by mouth daily. atorvastatin (LIPITOR) 80 mg tablet   No No   Sig: Take 1 Tab by mouth nightly. clopidogreL (PLAVIX) 75 mg tab   No Yes   Sig: Take 1 Tab by mouth daily. icosapent ethyL (VASCEPA) 1 gram capsule   No No   Sig: Take 1 Cap by mouth two (2) times daily (with meals). lisinopril (PRINIVIL, ZESTRIL) 40 mg tablet   No Yes   Sig: Take 1 Tab by mouth daily.       Facility-Administered Medications: None       Objective:     Patient Vitals for the past 24 hrs:   Temp Pulse Resp BP SpO2   10/19/20 0046  (!) 128 25  95 %   10/19/20 0032  (!) 126 24 (!) 149/85    10/18/20 2330  (!) 124 22 (!) 153/79 95 %   10/18/20 2246  (!) 118 (!) 32 (!) 152/78 98 %   10/18/20 2133  (!) 106 27  99 %   10/18/20 2128  (!) 109 27 125/69 99 %   10/18/20 2107  (!) 105 27  100 %   10/18/20 2059  (!) 116 23 120/64 98 %   10/18/20 2043  (!) 112 (!) 31 117/63 96 %   10/18/20 2028  (!) 109 (!) 32 112/63    10/18/20 1959 (!) 100.9 °F (38.3 °C) (!) 108 24 117/68 96 %     Oxygen Therapy  O2 Sat (%): 95 % (10/19/20 0046)  Pulse via Oximetry: 128 beats per minute (10/19/20 0046)  O2 Device: Room air (10/18/20 2043)    Estimated body mass index is 25.82 kg/m² as calculated from the following:    Height as of this encounter: 5' 6\" (1.676 m). Weight as of this encounter: 72.6 kg (160 lb). Intake/Output Summary (Last 24 hours) at 10/19/2020 0102  Last data filed at 10/19/2020 0049  Gross per 24 hour   Intake 2600 ml   Output 700 ml   Net 1900 ml       *Note that automatically entered I/Os may not be accurate; dependent on patient compliance with collection and accurate  by assistants. Physical Exam:  General:    Sick looking. AO x3. Dehydrated. Eyes:   Normal sclerae. Extraocular movements intact. HENT:  Normocephalic, atraumatic. Moist mucous membranes  CV:   RRR. No m/r/g. Lungs:  Crackles right lower lobe otherwise CTAB. No wheezing, rhonchi, or rales. Abdomen: Soft, tenderness on deep palpation on right upper quadrant. No rebound tenderness, nondistended. Extremities: Warm and dry. No cyanosis or edema. Neurologic: Left hemiparesis. Speech normal.  AOx3. Skin:     No rashes or jaundice. Normal coloration  Psych:  Normal mood and affect. I reviewed the labs, imaging, EKGs, telemetry, and other studies done this admission.   Data Reviewed:   Recent Results (from the past 24 hour(s))   LACTIC ACID    Collection Time: 10/18/20  8:19 PM   Result Value Ref Range    Lactic acid 1.6 0.4 - 2.0 MMOL/L   CBC WITH AUTOMATED DIFF    Collection Time: 10/18/20  8:19 PM   Result Value Ref Range    WBC 12.5 (H) 4.3 - 11.1 K/uL    RBC 3.33 (L) 4.23 - 5.6 M/uL    HGB 8.7 (L) 13.6 - 17.2 g/dL    HCT 27.5 (L) 41.1 - 50.3 % MCV 82.6 79.6 - 97.8 FL    MCH 26.1 26.1 - 32.9 PG    MCHC 31.6 31.4 - 35.0 g/dL    RDW 15.3 (H) 11.9 - 14.6 %    PLATELET 275 303 - 955 K/uL    MPV 12.9 (H) 9.4 - 12.3 FL    ABSOLUTE NRBC 0.00 0.0 - 0.2 K/uL    DF AUTOMATED      NEUTROPHILS 88 (H) 43 - 78 %    LYMPHOCYTES 6 (L) 13 - 44 %    MONOCYTES 5 4.0 - 12.0 %    EOSINOPHILS 0 (L) 0.5 - 7.8 %    BASOPHILS 0 0.0 - 2.0 %    IMMATURE GRANULOCYTES 1 0.0 - 5.0 %    ABS. NEUTROPHILS 11.0 (H) 1.7 - 8.2 K/UL    ABS. LYMPHOCYTES 0.8 0.5 - 4.6 K/UL    ABS. MONOCYTES 0.7 0.1 - 1.3 K/UL    ABS. EOSINOPHILS 0.0 0.0 - 0.8 K/UL    ABS. BASOPHILS 0.0 0.0 - 0.2 K/UL    ABS. IMM. GRANS. 0.1 0.0 - 0.5 K/UL   METABOLIC PANEL, COMPREHENSIVE    Collection Time: 10/18/20  8:19 PM   Result Value Ref Range    Sodium 142 136 - 145 mmol/L    Potassium 4.2 3.5 - 5.1 mmol/L    Chloride 109 (H) 98 - 107 mmol/L    CO2 23 21 - 32 mmol/L    Anion gap 10 7 - 16 mmol/L    Glucose 82 65 - 100 mg/dL    BUN 73 (H) 6 - 23 MG/DL    Creatinine 6.18 (H) 0.8 - 1.5 MG/DL    GFR est AA 13 (L) >60 ml/min/1.73m2    GFR est non-AA 10 (L) >60 ml/min/1.73m2    Calcium 7.7 (L) 8.3 - 10.4 MG/DL    Bilirubin, total 0.5 0.2 - 1.1 MG/DL    ALT (SGPT) 19 12 - 65 U/L    AST (SGOT) 43 (H) 15 - 37 U/L    Alk.  phosphatase 127 50 - 136 U/L    Protein, total 6.4 6.3 - 8.2 g/dL    Albumin 1.6 (L) 3.5 - 5.0 g/dL    Globulin 4.8 (H) 2.3 - 3.5 g/dL    A-G Ratio 0.3 (L) 1.2 - 3.5     PROCALCITONIN    Collection Time: 10/18/20  8:19 PM   Result Value Ref Range    Procalcitonin 114.66 ng/mL   LIPASE    Collection Time: 10/18/20  8:19 PM   Result Value Ref Range    Lipase 277 73 - 393 U/L   PROTHROMBIN TIME + INR    Collection Time: 10/18/20  8:19 PM   Result Value Ref Range    Prothrombin time 14.4 12.0 - 14.7 sec    INR 1.1     PTT    Collection Time: 10/18/20  8:19 PM   Result Value Ref Range    aPTT 35.1 24.3 - 35.4 SEC   MAGNESIUM    Collection Time: 10/18/20  8:19 PM   Result Value Ref Range    Magnesium 2.3 1.8 - 2.4 mg/dL   URINALYSIS W/ RFLX MICROSCOPIC    Collection Time: 10/18/20  8:33 PM   Result Value Ref Range    Color ORANGE      Appearance TURBID      Specific gravity 1.028 (H) 1.001 - 1.023      pH (UA) 5.5 5.0 - 9.0      Protein Negative NEG mg/dL    Glucose 100 mg/dL    Ketone Negative NEG mg/dL    Bilirubin SMALL (A) NEG      Blood LARGE (A) NEG      Urobilinogen 0.2 0.2 - 1.0 EU/dL    Nitrites Negative NEG      Leukocyte Esterase Negative NEG      WBC 0-3 0 /hpf    Bacteria 1+ (H) 0 /hpf    Casts 5-10 0 /lpf    Amorphous Crystals 1+ (H) 0   CK    Collection Time: 10/18/20  9:00 PM   Result Value Ref Range     (H) 21 - 215 U/L   TYPE & SCREEN    Collection Time: 10/18/20 11:36 PM   Result Value Ref Range    Crossmatch Expiration 10/21/2020     ABO/Rh(D) [place] POSITIVE     Antibody screen NEG        All Micro Results     Procedure Component Value Units Date/Time    CULTURE, URINE [[id number]]     Order Status:  Sent Specimen:  Urine from Clean catch     CULTURE, BLOOD [[id number]]     Order Status:  Sent Specimen:  Blood     CULTURE, STOOL [[id number]]     Order Status:  Sent Specimen:  Stool     BLOOD CULTURE [[id number]] Collected:  10/18/20 2019    Order Status:  Completed Specimen:  Blood Updated:  10/18/20 2027    C.  DIFFICILE/EPI PCR [[id number]]     Order Status:  Sent Specimen:  Stool     BLOOD CULTURE [[id number]]     Order Status:  Sent Specimen:  Blood           Current Facility-Administered Medications   Medication Dose Route Frequency    piperacillin-tazobactam (ZOSYN) 4.5 g in 0.9% sodium chloride (MBP/ADV) 100 mL  4.5 g IntraVENous Q12H    azithromycin (ZITHROMAX) 500 mg in 0.9% sodium chloride (MBP/ADV) 250 mL  500 mg IntraVENous Q24H    labetaloL (NORMODYNE;TRANDATE) injection 10 mg  10 mg IntraVENous Q4H PRN    insulin lispro (HUMALOG) injection   SubCUTAneous Q6H    lactated Ringers infusion  125 mL/hr IntraVENous CONTINUOUS     Current Outpatient Medications   Medication Sig    aspirin 81 mg chewable tablet Take 1 Tab by mouth daily.  clopidogreL (PLAVIX) 75 mg tab Take 1 Tab by mouth daily.  acetaminophen (TYLENOL) 325 mg tablet Take  by mouth every four (4) hours as needed for Pain.  lisinopril (PRINIVIL, ZESTRIL) 40 mg tablet Take 1 Tab by mouth daily.  icosapent ethyL (VASCEPA) 1 gram capsule Take 1 Cap by mouth two (2) times daily (with meals).  atorvastatin (LIPITOR) 80 mg tablet Take 1 Tab by mouth nightly. Other Studies:  No results found for this visit on 10/18/20. Ct Abd Pelv Wo Cont    Result Date: 10/18/2020  CT ABDOMEN AND PELVIS WITHOUT CONTRAST HISTORY: Abdominal pain and sepsis COMPARISON: None. TECHNIQUE: Helical imaging was performed from the lung bases through the ischial tuberosities without intravenous contrast. Oral contrast was not administered. Coronal and sagittal reformats were performed. Dose reduction techniques used: Automated exposure control, adjustment of the mAs and/or kVp according to patient's size, and iterative reconstruction techniques. FINDINGS: *  LUNG BASES: Right lower lobe airspace disease. Status post ASD closure. *  LIVER: Within normal limits. *  GALLBLADDER AND BILE DUCTS: Distended gallbladder containing gallstones with adjacent inflammation. *  SPLEEN: Within normal limits. *  URINARY TRACT: Within normal limits. *  ADRENALS: Within normal limits. *  PANCREAS: Within normal limits. *  GASTROINTESTINAL TRACT: Within normal limits. *  RETROPERITONEUM: Within normal limits. *  PERITONEAL CAVITY AND ABDOMINAL WALL: Subhepatic fluid collection measuring 6.1 x 7.0 x 5.0 cm. Has Hounsfield units equal to 24. *  PELVIS: Small amount of anterior right subphrenic ascites. *  SPINE / BONES: Within normal limits. *  OTHER COMMENTS: None. IMPRESSION: Findings worrisome for acute calculus cholecystitis. Possible subhepatic abscess. Small amount of ascites in the right anterior subphrenic space. Right lower lobe pneumonia.  Status post ASD closure. Evaluation of the abdominal viscera is limited without intravenous contrast. Date of Dictation: 10/18/2020 10:49 PM     [facility]    Result Date: 10/19/2020  RIGHT UPPER QUADRANT ULTRASOUND. HISTORY: Acute pancreatitis, abdominal pain. COMPARISON: None. FINDINGS: The ultrasonographic Davis's sign is reported as  negative. No gallstones. The gallbladder wall is thickened. The common bile duct is not dilated, 5.6 mm. Intrahepatic biliary tree is not dilated. Included portion of the pancreas and right kidney reveal mild heterogeneity of the pancreas. There is diffuse fatty change of liver. The liver appears enlarged. There is a fluid collection posterior to the right lobe of liver measuring 8.0 x 3.8 x 7.5 cm. IMPRESSION: Findings consistent with acute cholecystitis. Right subhepatic fluid collection as seen on prior CT. Possibly represent an abscess. Hepatomegaly with diffuse fatty change. Xr Chest Port    Result Date: 10/18/2020  EXAM: XR CHEST PORT INDICATION: Respiratory distress COMPARISON: 10/12/2017 FINDINGS: A portable AP radiograph of the chest was obtained at 2054 hours. The patient is on a cardiac monitor. Right lower lobe airspace disease. The cardiac and mediastinal contours and pulmonary vascularity are normal.  The bones and soft tissues are grossly within normal limits. IMPRESSION: Right lower lobe atelectasis or pneumonia.       SARS-CoV-2 Lab Results  \"Novel Coronavirus\" Test: No results found for: COV2NT   \"Emergent Disease\" Test:   Lab Results   Component Value Date/Time    EDPR Not detected 06/06/2020 03:40 PM     \"SARS-COV-2\" Test: No results found for: XGCOVT  \"Precision Labs\" Test: No results found for: RSLT  Rapid Test:   Lab Results   Component Value Date/Time    COVR Not detected 07/17/2020 05:03 AM              Assessment and Plan:     Hospital Problems as of 10/19/2020 Date Reviewed: 3/3/2017          Codes Class Noted - Resolved POA    History of CVA (cerebrovascular accident) ICD-10-CM: Z86.73  ICD-9-CM: V12.54  10/19/2020 - Present Unknown        Abscess of abdominal cavity ([facility] ICD-10-CM: K65.1  ICD-9-CM: 567.22  10/19/2020 - Present Unknown        RLL pneumonia ICD-10-CM: J18.9  ICD-9-CM: 041  10/19/2020 - Present Unknown        Left hemiparesis ([facility] ICD-10-CM: G81.94  ICD-9-CM: 342.90  10/19/2020 - Present Unknown        Sepsis ([facility] ICD-10-CM: A41.9  ICD-9-CM: 038.9, 995.91  10/19/2020 - Present Unknown        Hypertension (Chronic) ICD-10-CM: I10  ICD-9-CM: 401.9  Unknown - Present Yes        Type 2 diabetes mellitus (HCC) (Chronic) ICD-10-CM: E11.9  ICD-9-CM: 250.00  Unknown - Present               Plan:  #Sepsis: Likely secondary to intra-abdominal abscess. Currently on vancomycin and Zosyn. He provided history of diarrhea to ER provider and his C. difficile and stool studies are pending. He received total 3 L of IV fluid. On IV fluids. Lactic acid normal and procalcitonin elevated. General surgery already consulted. We will continue to monitor. Panculture ordered. #Acute kidney injury: Likely secondary to sepsis and dehydration. IV fluid ordered. Lab work and renal ultrasound ordered. Nephrology consulted. We will continue to monitor. #Right upper lobe infiltrate: Cannot rule out aspiration at present given patient history of vomiting and stroke. Speech therapy ordered. N.p.o. for now. Added azithromycin to above regimen. #Stroke: Aspirin and Plavix on hold in case patient need surgical intervention or at least evaluated by surgeon. #Hypertension: As needed labetalol. #History of type 2 diabetes mellitus: Apparently there is history and his last HbA1c is 6.9. He is not aware of any diagnosis of diabetes. Sliding scale ordered. HbA1c ordered. #Tachycardia: Likely secondary to sepsis. Received IV fluids. Checking TSH and EKG on the patient. SCDs for DVT prophylaxis till patient get evaluated by surgeon. Patient is AO x3.   He wants his wife to be power of  and he wants to be full code. Patient's wife and she was not aware that patient is in the hospital.  I have updated her about patient's status. Both verbalized understanding that patient condition may deteriorate in spite of treatment. Patient is of high complexity given sepsis and intra-abdominal abscess and management of that.     DVT ppx ordered  Code status:  Full  Estimated LOS:  Greater than 2 midnights  Risk:  high    Signed:  Elicia Godoy MD

## 2020-10-19 NOTE — PROGRESS NOTES
TRANSFER - IN REPORT:    Verbal report received from JERRY Forman on Nik Gaona.  being received from PACU for routine post - op      Report consisted of patients Situation, Background, Assessment and   Recommendations(SBAR). Information from the following report(s) SBAR was reviewed with the receiving nurse. Opportunity for questions and clarification was provided. Assessment completed upon patients arrival to unit and care assumed.

## 2020-10-19 NOTE — PROGRESS NOTES
Chart screened by  for potential discharge needs or concerns. Pt is well known to this writer from a previous admission. Pt is a LTC resident at [facility].  SW confirmed with the SNF admissions director that the pt's bed is on a 10-day Medicaid bed hold. A regular COVID test will be required prior to pt's return. SW will continue to follow to facilitate pt's return to SNF at [place].     Care Management Interventions  PCP Verified by CM: Yes(SNF MD)  Mode of Transport at Discharge: BLS([facility])  Transition of Care Consult (CM Consult): SNF(no consult received)  Partner SNF: Yes  Discharge Durable Medical Equipment: No  Physical Therapy Consult: No  Occupational Therapy Consult: No  Speech Therapy Consult: Yes  Current Support Network: [address] [place] [address], Family Lives Nearby  Confirm Follow Up Transport: Other (see comment)(facility transport)  The Plan for Transition of Care is Related to the Following Treatment Goals : Return to SNF to resume LTC  The Patient and/or Patient Representative was Provided with a Choice of Provider and Agrees with the Discharge Plan?: Yes  Discharge Location  Discharge Placement: Skilled nursing facility([facility] room 622-A)

## 2020-10-19 NOTE — ANESTHESIA POSTPROCEDURE EVALUATION
Procedure(s):  ATEMPTED LAPAROSCOPIC CHOLECYSTECTOMY CONVERTED TO OPEN. general    Anesthesia Post Evaluation        Patient location during evaluation: PACU  Patient participation: complete - patient participated  Level of consciousness: awake  Pain management: satisfactory to patient  Airway patency: patent  Anesthetic complications: no  Cardiovascular status: hemodynamically stable  Respiratory status: spontaneous ventilation  Hydration status: euvolemic  Post anesthesia nausea and vomiting:  none      INITIAL Post-op Vital signs:   Vitals Value Taken Time   /89 10/19/2020  6:17 PM   Temp 36.9 °C (98.5 °F) 10/19/2020  6:17 PM   Pulse 102 10/19/2020  6:20 PM   Resp 16 10/19/2020  6:17 PM   SpO2 99 % 10/19/2020  6:20 PM   Vitals shown include unvalidated device data.

## 2020-10-19 NOTE — CONSULTS
H&P/Consult Note/Progress Note/Office Note:   Sandra Gil MRN: [id number]  :1975  Age:45 y.o.    HPI: Sandra Gil is a 50 y.o. male who has PMHx of stroke, HTN, DM, who we are asked by hospitalist to see for intraabdominal abscess. He was transferred from his nursing home  on 10/18 due to N/V and abdominal pain. In the ED, pt was found to have WBC 12 and fever. He was started on abx. Tbili 0.6, Alk Phos and AST elevated. CT abdomen revealed possible cholecystitis which was confirmed on US. General surgery was consulted for evaluation of cholecystitis with subhepatic abscess. US 10/19/2020:  IMPRESSION: Findings consistent with acute cholecystitis.     Right subhepatic fluid collection as seen on prior CT.  Possibly represent an  abscess.     Hepatomegaly with diffuse fatty change.       CTAP 10/18/2020:  IMPRESSION:      Findings worrisome for acute calculus cholecystitis.     Possible subhepatic abscess.     Small amount of ascites in the right anterior subphrenic space.     Right lower lobe pneumonia.     Status post ASD closure.       Evaluation of the abdominal viscera is limited without intravenous contrast.    Past Medical History:   Diagnosis Date    Acute ischemic stroke ([facility] 2019    Acute pancreatitis 2014    Cerebrovascular accident (CVA) due to embolism ([address] 2019    Diabetes ([facility] 2002    Diabetes ([facility]     Diabetes mellitus     Hypertension      Past Surgical History:   Procedure Laterality Date    HX HERNIA REPAIR      HX ORTHOPAEDIC      carpal tunnel surgery     Current Facility-Administered Medications   Medication Dose Route Frequency    azithromycin (ZITHROMAX) 500 mg in 0.9% sodium chloride (MBP/ADV) 250 mL  500 mg IntraVENous Q24H    atorvastatin (LIPITOR) tablet 80 mg  80 mg Oral QHS    sodium chloride (NS) flush 5-40 mL  5-40 mL IntraVENous Q8H    sodium chloride (NS) flush 5-40 mL  5-40 mL IntraVENous PRN    acetaminophen (TYLENOL) tablet 650 mg  650 mg Oral Q6H PRN    Or    acetaminophen (TYLENOL) suppository 650 mg  650 mg Rectal Q6H PRN    senna (SENOKOT) tablet 8.6 mg  1 Tab Oral DAILY PRN    labetaloL (NORMODYNE;TRANDATE) injection 10 mg  10 mg IntraVENous Q4H PRN    insulin lispro (HUMALOG) injection   SubCUTAneous Q6H    lactated Ringers infusion  125 mL/hr IntraVENous CONTINUOUS    Vancomycin Intermittent Dosing   Other Rx Dosing/Monitoring    cefepime (MAXIPIME) 1 g in 0.9% sodium chloride (MBP/ADV) 50 mL  1 g IntraVENous Q24H    influenza vaccine - (6 mos+)(PF) (FLUARIX/FLULAVAL/FLUZONE QUAD) injection 0.5 mL  0.5 mL IntraMUSCular PRIOR TO DISCHARGE     Patient has no known allergies. Social History     Socioeconomic History    Marital status:      Spouse name: Not on file    Number of children: Not on file    Years of education: Not on file    Highest education level: Not on file   Tobacco Use    Smoking status: Former Smoker     Packs/day: 0.25     Types: Cigarettes     Last attempt to quit: 2019     Years since quittin.8    Smokeless tobacco: Former User     Types: Chew   Substance and Sexual Activity    Alcohol use: No    Drug use: No    Sexual activity: Yes     Partners: Female     Birth control/protection: None     Social History     Tobacco Use   Smoking Status Former Smoker    Packs/day: 0.25    Types: Cigarettes    Last attempt to quit: 2019    Years since quittin.8   Smokeless Tobacco Former User    Types: [de-identified]     Family History   Problem Relation Age of Onset    Diabetes Mother     Diabetes Father      ROS: The patient has no difficulty with chest pain or shortness of breath. No fever or chills. Comprehensive review of systems was otherwise unremarkable except as noted above.     Physical Exam:   Visit Vitals  BP (!) 154/89 (BP 1 Location: Right arm, BP Patient Position: At rest)   Pulse (!) 129   Temp 99.9 °F (37.7 °C)   Resp 20   Ht 5' 6\" (1.676 m) Wt 160 lb (72.6 kg)   SpO2 96%   BMI 25.82 kg/m²     Constitutional: Alert, oriented, cooperative patient in no acute distress; appears stated age    Eyes:Sclera are clear. EOMs intact  ENMT: no external lesions gross hearing normal; no obvious neck masses, no ear or lip lesions, nares normal  CV: RRR. Normal perfusion  Resp: No JVD. Breathing is  non-labored; no audible wheezing. GI: soft and non-distended; tender RUQ.    Musculoskeletal: left hemiparesis  Neuro:  Oriented;left hemiparesis  Psychiatric: normal affect and mood, no memory impairment    Recent vitals (if inpt):  Patient Vitals for the past 24 hrs:   BP Temp Pulse Resp SpO2 Height Weight   10/19/20 0345 (!) 154/89 99.9 °F (37.7 °C) (!) 129 20 96 %     10/19/20 0228 136/77  (!) 123 20 99 %     10/19/20 0224   (!) 116 26 98 %     10/19/20 0059 129/78  (!) 121 29 96 %     10/19/20 0046   (!) 128 25 95 %     10/19/20 0032 (!) 149/85  (!) 126 24      10/18/20 2330 (!) 153/79  (!) 124 22 95 %     10/18/20 2246 (!) 152/78  (!) 118 (!) 32 98 %     10/18/20 2133   (!) 106 27 99 %     10/18/20 2128 125/69  (!) 109 27 99 %     10/18/20 2107   (!) 105 27 100 %     10/18/20 2059 120/64  (!) 116 23 98 %     10/18/20 2043 117/63  (!) 112 (!) 31 96 %     10/18/20 2028 112/63  (!) 109 (!) 32      10/18/20 1959 117/68 (!) 100.9 °F (38.3 °C) (!) 108 24 96 % 5' 6\" (1.676 m) 160 lb (72.6 kg)       Labs:  Recent Labs     10/19/20  0639 10/18/20  2019   WBC 9.8 12.5*   HGB 10.5* 8.7*    198    142   K 4.3 4.2   * 109*   CO2 19* 23   BUN 62* 73*   CREA 4.81* 6.18*   GLU 63* 82   PTP  --  14.4   INR  --  1.1   APTT  --  35.1   TBILI 0.6 0.5   ALT 19 19   * 127   LPSE  --  277       Lab Results   Component Value Date/Time    WBC 9.8 10/19/2020 06:39 AM    HGB 10.5 (L) 10/19/2020 06:39 AM    PLATELET 120 78/47/3888 06:39 AM    Sodium 144 10/19/2020 06:39 AM    Potassium 4.3 10/19/2020 06:39 AM Chloride 112 (H) 10/19/2020 06:39 AM    CO2 19 (L) 10/19/2020 06:39 AM    BUN 62 (H) 10/19/2020 06:39 AM    Creatinine 4.81 (H) 10/19/2020 06:39 AM    Glucose 63 (L) 10/19/2020 06:39 AM    INR 1.1 10/18/2020 08:19 PM    aPTT 35.1 10/18/2020 08:19 PM    Bilirubin, total 0.6 10/19/2020 06:39 AM    ALT (SGPT) 19 10/19/2020 06:39 AM    Alk. phosphatase 151 (H) 10/19/2020 06:39 AM    Lipase 277 10/18/2020 08:19 PM    Troponin-I, Qt. <0.04 11/19/2014 04:10 PM       I reviewed recent labs and recent radiologic studies. I independently reviewed radiology images for studies I described above or studies I have ordered.    Admission date (for inpatients): 10/18/2020   * No surgery date entered *  Procedure(s):  CHOLECYSTECTOMY LAPAROSCOPIC    ASSESSMENT/PLAN:  Problem List  Date Reviewed: 3/3/2017          Codes Class Noted    History of CVA (cerebrovascular accident) ICD-10-CM: Z86.73  ICD-9-CM: V12.54  10/19/2020        Abscess of abdominal cavity ([facility] ICD-10-CM: K65.1  ICD-9-CM: 567.22  10/19/2020        RLL pneumonia ICD-10-CM: J18.9  ICD-9-CM: 486  10/19/2020        Left hemiparesis ([facility] ICD-10-CM: G81.94  ICD-9-CM: 342.90  10/19/2020        Sepsis ([facility] ICD-10-CM: A41.9  ICD-9-CM: 038.9, 995.91  10/19/2020        Tachycardia ICD-10-CM: R00.0  ICD-9-CM: 785.0  10/19/2020        LOKI (acute kidney injury) ([facility] ICD-10-CM: N17.9  ICD-9-CM: 584.9  10/19/2020        Hypomagnesemia ICD-10-CM: F75.07  ICD-9-CM: 275.2  3/7/2020        PFO (patent foramen ovale) ICD-10-CM: Q21.1  ICD-9-CM: 745.5  12/17/2019        Arrhythmia ICD-10-CM: I49.9  ICD-9-CM: 427.9  12/15/2019        Hyperlipemia ICD-10-CM: E78.5  ICD-9-CM: 272.4  12/15/2019        Acute embolic stroke ([facility] [id number]: I63.9  ICD-9-CM: 434.11  12/12/2019        Microcytic anemia ICD-10-CM: D50.9  ICD-9-CM: 280.9  11/20/2014        Acute pancreatitis ICD-10-CM: K85.90  ICD-9-CM: 898.1  11/19/2014        GERD (gastroesophageal reflux disease) (Chronic) ICD-10-CM: K21.9  ICD-9-CM: 530.81  11/19/2014        Hypertension (Chronic) ICD-10-CM: I10  ICD-9-CM: 401.9  Unknown        Type 2 diabetes mellitus (HCC) (Chronic) ICD-10-CM: E11.9  ICD-9-CM: 250.00  Unknown            Active Problems:    Hypertension ()      History of CVA (cerebrovascular accident) (10/19/2020)      Abscess of abdominal cavity ([address] (10/19/2020)      RLL pneumonia (10/19/2020)      Left hemiparesis ([address] (10/19/2020)      Sepsis ([address] (10/19/2020)      Tachycardia (10/19/2020)      LOKI (acute kidney injury) ([address] (10/19/2020)       Plan:    NPO  IVFs  Pain control  Consent  Continue current abx  OR this afternoon for cholecystectomy      Signed:  ELAINE Martins     I have seen and examined the patient with the Nurse Practitioner and agree with the above assessment and plan. Acute cholecystitis, sepsis. Emergency lap eve, poss open.      Yessica Sharpe MD

## 2020-10-19 NOTE — PROGRESS NOTES
Initial visit by  to convey care and concern and to explore spiritual needs. Mr. Misa Samuels was not in his room. Patient is known to me from a lengthy prior admission. No spiritual needs are known at this time. Chaplains remain available for follow-up care.      Dontrell Andrade 66  Board Certified

## 2020-10-19 NOTE — PROGRESS NOTES
TRANSFER - OUT REPORT:    Verbal report given to Xenia Sánchez RN on Rama Saleh.  being transferred to [facility] for routine progression of care       Report consisted of patients Situation, Background, Assessment and   Recommendations(SBAR). Information from the following report(s) SBAR was reviewed with the receiving nurse. Lines:   Peripheral IV 10/18/20 Right Antecubital (Active)   Site Assessment Clean, dry, & intact 10/19/20 0755   Phlebitis Assessment 0 10/19/20 0755   Infiltration Assessment 0 10/19/20 0755   Dressing Status Clean, dry, & intact 10/19/20 0755   Dressing Type Tape;Transparent 10/19/20 0755       Peripheral IV 10/18/20 Right Forearm (Active)   Site Assessment Clean, dry, & intact 10/19/20 0755   Phlebitis Assessment 0 10/19/20 0755   Infiltration Assessment 0 10/19/20 0755   Dressing Status Clean, dry, & intact 10/19/20 0755   Dressing Type Tape;Transparent 10/19/20 0755   Hub Color/Line Status Infusing 10/19/20 0755        Opportunity for questions and clarification was provided.       Patient transported with:  Cardiac monitor [id number]

## 2020-10-19 NOTE — CONSULTS
Nephrology consult    Admission Date:  10/18/2020    Admission Diagnosis  Sepsis ([facility] [A41.9]    We are asked by Dr. Chou Mode    History of Present Illness: Mr. Urmila Mccord is a 50 y.o male with PMH significant for CVA with left sided residual weakness, pancreatitis, DM and HTN admitted from nursing home with complaints of abdominal pain and vomiting. Found with elevated procal 114.6, , temp 100.9, and cxr with right lower lobe atelectasis or pneumonia started on cefepime, azithromycin, vanc and LR IVF. Urine and blood cultures pending. We are consulted for LOKI. From a renal standpoint his creatinine on admission was 6.18->4.84, CO2 19, UA with small bilirubin, large blood, 5-10 casts, Ct abd w/o contrast with evidence of worsening acute calculus cholecystitis, ? Subhepatic abscess, and small amount of ascites in the right anterior subphrenic space. Renal US with normal size kidneys bilaterally, increased renal echogenicity and no evidence of hydronephrosis. Home meds significant for ACEi. Patient seen and examined on rounds he reports vomiting better, still with abdominal tenderness and loose stools. Denies SOB, CP, nausea, fever/chills, dizziness, edema, or NSAID use. No dysuria, change in uop or urinary hesitancy.      Past Medical History:   Diagnosis Date    Acute ischemic stroke ([facility] 12/12/2019    Acute pancreatitis 11/19/2014    Cerebrovascular accident (CVA) due to embolism ([facility] 12/12/2019    Diabetes ([facility] 2002    Diabetes ([facility]     Diabetes mellitus     Hypertension       Past Surgical History:   Procedure Laterality Date    HX HERNIA REPAIR      HX ORTHOPAEDIC      carpal tunnel surgery      Current Facility-Administered Medications   Medication Dose Route Frequency    azithromycin (ZITHROMAX) 500 mg in 0.9% sodium chloride (MBP/ADV) 250 mL  500 mg IntraVENous Q24H    atorvastatin (LIPITOR) tablet 80 mg  80 mg Oral QHS    sodium chloride (NS) flush 5-40 mL  5-40 mL IntraVENous Q8H    sodium chloride (NS) flush 5-40 mL  5-40 mL IntraVENous PRN    acetaminophen (TYLENOL) tablet 650 mg  650 mg Oral Q6H PRN    Or    acetaminophen (TYLENOL) suppository 650 mg  650 mg Rectal Q6H PRN    senna (SENOKOT) tablet 8.6 mg  1 Tab Oral DAILY PRN    labetaloL (NORMODYNE;TRANDATE) injection 10 mg  10 mg IntraVENous Q4H PRN    insulin lispro (HUMALOG) injection   SubCUTAneous Q6H    lactated Ringers infusion  125 mL/hr IntraVENous CONTINUOUS    Vancomycin Intermittent Dosing   Other Rx Dosing/Monitoring    cefepime (MAXIPIME) 1 g in 0.9% sodium chloride (MBP/ADV) 50 mL  1 g IntraVENous Q24H    influenza vaccine - (6 mos+)(PF) (FLUARIX/FLULAVAL/FLUZONE QUAD) injection 0.5 mL  0.5 mL IntraMUSCular PRIOR TO DISCHARGE     No Known Allergies   Social History     Tobacco Use    Smoking status: Former Smoker     Packs/day: 0.25     Types: Cigarettes     Last attempt to quit: 2019     Years since quittin.8    Smokeless tobacco: Former User     Types: Chew   Substance Use Topics    Alcohol use: No      Family History   Problem Relation Age of Onset    Diabetes Mother     Diabetes Father         Review of Systems  Gen - no fever, no chills, appetite poor  HEENT - no sore throat, no decreased vision, no hearing loss  CV - no chest pain, no palpitation, no orthopnea  Lung - no shortness of breath, no cough, no hemoptysis  Abd - + tenderness, no nausea, + vomiting- better, no bloody stool  Ext - no edema, no clubbing, no cyanosis  Musculoskeletal - no joint pain, no back pain  Neurologic - no headaches, no dizziness, no seizures, left sided residual weakness  Psychiatric - no anxiety, no depression  Skin - no rashes, no pupura  Genitourinary - no decreased urine output, no hematuria, no dysuria   Objective:     Vitals:    10/19/20 0224 10/19/20 0228 10/19/20 0345 10/19/20 1159   BP:  136/77 (!) 154/89 (!) 158/95   Pulse: (!) 116 (!) 123 (!) 129 (!) 121   Resp:  20 18   Temp:   99.9 °F (37.7 °C) 99.6 °F (37.6 °C)   SpO2: 98% 99% 96% 96%   Weight:       Height:           Intake/Output Summary (Last 24 hours) at 10/19/2020 1320  Last data filed at 10/19/2020 0222  Gross per 24 hour   Intake 3600 ml   Output 700 ml   Net 2900 ml       Physical Exam  GEN :in no distress, alert and oriented  HEENT: anicteric sclerae, Mucous membranes are moist.  Neck - supple without JVD, No lymphadenopathy. CV - tachy, S1, S2, no Rub   Lung - clear bilaterally, lungs expand symmetrically  Abd - soft, + tender with palpation, bowel sounds present, no hepatosplenomegaly  Ext - no clubbing, no cyanosis, no edema  Neurologic - left hemiparesis   Genitourinary - bladder nonpalpable  Skin - no rashes, no purpura, no ecchymoses  Psychiatric: Normal mood and affect. Data Review:   Recent Labs     10/19/20  0639 10/18/20  2019   WBC 9.8 12.5*   HGB 10.5* 8.7*   HCT 33.3* 27.5*    198     Recent Labs     10/19/20  0639 10/18/20  2019    142   K 4.3 4.2   * 109*   CO2 19* 23   BUN 62* 73*   CREA 4.81* 6.18*   GLU 63* 82   CA 8.4 7.7*   MG  --  2.3     No results for input(s): PH, PCO2, PO2, PCO2 in the last 72 hours. Problem List:     Patient Active Problem List    Diagnosis Date Noted    History of CVA (cerebrovascular accident) 10/19/2020    Abscess of abdominal cavity ([address] 10/19/2020    RLL pneumonia 10/19/2020    Left hemiparesis ([address] 10/19/2020    Sepsis ([address] 10/19/2020    Tachycardia 10/19/2020    LOKI (acute kidney injury) ([address] 10/19/2020    Hypomagnesemia 03/07/2020    PFO (patent foramen ovale) 12/17/2019    Arrhythmia 12/15/2019    Hyperlipemia 31/09/6585    Acute embolic stroke ([address] 63/31/9410    Microcytic anemia 11/20/2014    Acute pancreatitis 11/19/2014    GERD (gastroesophageal reflux disease) 11/19/2014    Hypertension     [facility] diabetes mellitus [facility]        Impression:    Plan:   1.  LOKI likely 2/2 pre renal azotemia in the setting of nausea, vomiting, Cr baseline 0.9-1.3. no evidence of obstructive nephropathy on renal imaging  Cr improving with IVF, no indication for acute RRT, trend renal indices with strict I&O    2. Right subhepatic fluid collection/ acute calculus cholcystitis Dr. Lennox Cooks eval, plan for cholecystectomy, monitor vanc levels in setting of #1    3. RLL pneumonia Abx per primary     4. Hx CVA with Left hemiparesis     5.  DM type II SSI per hosp

## 2020-10-19 NOTE — PROGRESS NOTES
Messaged hospitalist Dr. Augs Brown regarding clarification continuation orders for telemetry monitoring post op.

## 2020-10-19 NOTE — PERIOP NOTES
TRANSFER - IN REPORT:    Verbal report received from Cecilia Piña RN on Lenor Prude.  being received from [phone] for ordered procedure      Report consisted of patients Situation, Background, Assessment and   Recommendations(SBAR). Information from the following report(s) SBAR and MAR was reviewed with the receiving nurse. Opportunity for questions and clarification was provided. Assessment will be completed upon patients arrival to unit and care assumed.

## 2020-10-19 NOTE — PROGRESS NOTES
Patient to room 710 via stretcher with transporter. Vitals taken. Pt HR is 129 and patient is on waiting list for telemetry monitor per monitor room. Skin assessment done by Gerardo Smith RN. Skin has no signs of breakdown. Pt lethargic with delayed responses. Dr Christina Scott notified of pt hr of 129 with initial vitals. Bed low and locked. Call light within reach.

## 2020-10-19 NOTE — PROGRESS NOTES
Patient seen and examined at bedside. Continues to have abdominal pain but is currently tolerable. Denies fevers/chills, chest pain, or shortness of breath. Has mild/diffuse TTP and +Davis sign on examination. Plan for emergency lap eve with possible open with general surgery in OR this afternoon. Continue vancomycin/Zosyn/azithromycin and IVF infusion, cultures collected and pending upon admission. Holding DAPT. No bill for this encounter.

## 2020-10-19 NOTE — PROGRESS NOTES
Pharmacokinetic Consult to Pharmacist    Alejandro Schmidt. is a 50 y.o. male being treated  with vancomycin. Height: 5' 6\" (167.6 cm)  Weight: 72.6 kg (160 lb)  Lab Results   Component Value Date/Time    BUN 73 (H) 10/18/2020 08:19 PM    Creatinine 6.18 (H) 10/18/2020 08:19 PM    WBC 12.5 (H) 10/18/2020 08:19 PM    Procalcitonin 114.66 10/18/2020 08:19 PM    Lactic acid 1.6 10/18/2020 08:19 PM      Estimated Creatinine Clearance: 13.3 mL/min (A) (based on SCr of 6.18 mg/dL (H)). Day 1 of vancomycin. Goal trough is 15-20. Vancomycin dose initiated at 1750 mg x 1 dose; followed by intermittent dosing. Will continue to follow patient and order levels when clinically indicated. Thank you,  Eunice Odonnell, PharmD.

## 2020-10-19 NOTE — PROGRESS NOTES
Pharmacokinetic Consult to Pharmacist    Miladis Arevalo. is a 50 y.o. male being treated  with vancomycin. Height: 5' 6\" (167.6 cm)  Weight: 72.6 kg (160 lb)  Lab Results   Component Value Date/Time    BUN 62 (H) 10/19/2020 06:39 AM    Creatinine 4.81 (H) 10/19/2020 06:39 AM    WBC 9.8 10/19/2020 06:39 AM    Procalcitonin 114.66 10/18/2020 08:19 PM    Lactic acid 1.6 10/18/2020 08:19 PM      Estimated Creatinine Clearance: 17.1 mL/min (A) (based on SCr of 4.81 mg/dL (H)). Day 2 of vancomycin. Goal trough is 15-20. Order random level 10/19 at 2100 (24 hours post-dose) due to renal insufficiency. Renal function is somewhat improving but still poor. Will continue to follow patient and order levels when clinically indicated.     Thank you,  [address] [facility]

## 2020-10-19 NOTE — ANESTHESIA PREPROCEDURE EVALUATION
Relevant Problems   No relevant active problems       Anesthetic History   No history of anesthetic complications            Review of Systems / Medical History  Pertinent labs reviewed    Pulmonary          Pneumonia (RLL) and smoker (former)         Neuro/Psych       CVA (12/2019 secondary to PFO, L hemiparesis)       Cardiovascular    Hypertension              Exercise tolerance: <4 METS  Comments: Sinus tachy  PFO closure 7/20.    GI/Hepatic/Renal     GERD: well controlled    Renal disease: ARF       Endo/Other    Diabetes: well controlled, type 2    Anemia     Other Findings            Physical Exam    Airway  Mallampati: II  TM Distance: 4 - 6 cm  Neck ROM: normal range of motion   Mouth opening: Normal     Cardiovascular    Rhythm: regular  Rate: abnormal        Comments: tachy Dental    Dentition: Poor dentition  Comments: Numerous missing and cracked teeth   Pulmonary  Breath sounds clear to auscultation               Abdominal  GI exam deferred       Other Findings            Anesthetic Plan    ASA: 3, emergent  Anesthesia type: general          Induction: Intravenous  Anesthetic plan and risks discussed with: Patient

## 2020-10-19 NOTE — PROGRESS NOTES
TRANSFER - IN REPORT:    Verbal report received from Yuliet Moreno on [facility].  being received from ER for routine progression of care      Report consisted of patients Situation, Background, Assessment and   Recommendations(SBAR). Information from the following report(s) SBAR was reviewed with the receiving nurse. Opportunity for questions and clarification was provided. Assessment completed upon patients arrival to unit and care assumed.

## 2020-10-19 NOTE — PERIOP NOTES
TRANSFER - OUT REPORT:    Verbal report given to Berry Kang RN on De Frankel.  being transferred to  for routine post - op       Report consisted of patients Situation, Background, Assessment and   Recommendations(SBAR). Information from the following report(s) OR Summary, Procedure Summary, Intake/Output and MAR was reviewed with the receiving nurse. Lines:   Peripheral IV 10/19/20 Right Antecubital (Active)   Site Assessment Clean, dry, & intact 10/19/20 1744   Phlebitis Assessment 0 10/19/20 1744   Infiltration Assessment 0 10/19/20 1744   Dressing Status Clean, dry, & intact 10/19/20 1744   Dressing Type Tape;Transparent 10/19/20 1744   Hub Color/Line Status Patent 10/19/20 1744       Peripheral IV 10/19/20 Right Forearm (Active)   Site Assessment Clean, dry, & intact 10/19/20 1744   Phlebitis Assessment 0 10/19/20 1744   Infiltration Assessment 0 10/19/20 1744   Dressing Status Clean, dry, & intact 10/19/20 1744   Dressing Type Tape;Transparent 10/19/20 1744   Hub Color/Line Status Patent 10/19/20 1744        Opportunity for questions and clarification was provided. Patient transported with:   O2 @ 2 liters  Tech    VTE prophylaxis orders have been written for De Frankel. .    Patient and family given floor number and nurses name. Family updated re: pt status after security code verified.

## 2020-10-19 NOTE — ED PROVIDER NOTES
43-year-old male with history of diabetes, hypertension, pancreatitis, CVA with left-sided residual deficits presents via EMS from [address] with complaint of abdominal pain, fever, chills, diarrhea over the past several days. Patient denies cough, shortness of breath, urinary symptoms, chest pain, shortness of breath. Patient denies melena, hematochezia. States that symptoms have been present for the past 2 to 3 days. Reports decreased oral intake. The history is provided by the patient. No  was used. Abdominal Pain    This is a new problem. The current episode started more than 2 days ago. The problem occurs constantly. The problem has not changed since onset. The pain is located in the generalized abdominal region. The quality of the pain is cramping. The pain is at a severity of 4/10. The pain is moderate. Associated symptoms include a fever, diarrhea, nausea, vomiting and myalgias. Pertinent negatives include no anorexia, no belching, no flatus, no hematochezia, no melena, no constipation, no dysuria, no frequency, no hematuria, no headaches, no arthralgias, no trauma, no chest pain and no back pain. Nothing worsens the pain. The pain is relieved by nothing.         Past Medical History:   Diagnosis Date    Acute ischemic stroke ([address] 12/12/2019    Acute pancreatitis 11/19/2014    Cerebrovascular accident (CVA) due to embolism ([address] 12/12/2019    Diabetes ([address] 2002    Diabetes [facility]     Diabetes mellitus     Hypertension        Past Surgical History:   Procedure Laterality Date    HX HERNIA REPAIR      HX ORTHOPAEDIC      carpal tunnel surgery         Family History:   Problem Relation Age of Onset    Diabetes Mother     Diabetes Father        Social History     Socioeconomic History    Marital status:      Spouse name: Not on file    Number of children: Not on file    Years of education: Not on file    Highest education level: Not on file   Occupational History    Not on file   Social Needs    Financial resource strain: Not on file    Food insecurity     Worry: Not on file     Inability: Not on file    Transportation needs     Medical: Not on file     Non-medical: Not on file   Tobacco Use    Smoking status: Former Smoker     Packs/day: 0.25     Types: Cigarettes     Last attempt to quit: 2019     Years since quittin.8    Smokeless tobacco: Former User     Types: Chew   Substance and Sexual Activity    Alcohol use: No    Drug use: No    Sexual activity: Yes     Partners: Female     Birth control/protection: None   Lifestyle    Physical activity     Days per week: Not on file     Minutes per session: Not on file    Stress: Not on file   Relationships    Social connections     Talks on phone: Not on file     Gets together: Not on file     Attends Judaism service: Not on file     Active member of club or organization: Not on file     Attends meetings of clubs or organizations: Not on file     Relationship status: Not on file    Intimate partner violence     Fear of current or ex partner: Not on file     Emotionally abused: Not on file     Physically abused: Not on file     Forced sexual activity: Not on file   Other Topics Concern    Not on file   Social History Narrative    Not on file         ALLERGIES: Patient has no known allergies. Review of Systems   Constitutional: Positive for chills, fatigue and fever. HENT: Negative for congestion, rhinorrhea and sore throat. Respiratory: Negative for cough and shortness of breath. Cardiovascular: Negative for chest pain. Gastrointestinal: Positive for abdominal pain, diarrhea, nausea and vomiting. Negative for anorexia, blood in stool, constipation, flatus, hematochezia and melena. Genitourinary: Negative for dysuria, frequency and hematuria. Musculoskeletal: Positive for myalgias. Negative for arthralgias, back pain, neck pain and neck stiffness. Skin: Negative for rash. Neurological: Negative for seizures, light-headedness and headaches. Psychiatric/Behavioral: Negative for confusion. Vitals:    10/18/20 1959   BP: 117/68   Pulse: (!) 108   Resp: 24   Temp: (!) 100.9 °F (38.3 °C)   SpO2: 96%   Weight: 72.6 kg (160 lb)   Height: 5' 6\" (1.676 m)            Physical Exam  Vitals signs and nursing note reviewed. Constitutional:       Comments: Ill-appearing. HENT:      Head: Normocephalic. Mouth/Throat:      Mouth: Mucous membranes are dry. Eyes:      Extraocular Movements: Extraocular movements intact. Pupils: Pupils are equal, round, and reactive to light. Cardiovascular:      Rate and Rhythm: Regular rhythm. Tachycardia present. Pulses: Normal pulses. Heart sounds: Normal heart sounds. Pulmonary:      Effort: Pulmonary effort is normal.      Breath sounds: Normal breath sounds. Comments: CTAB. Abdominal:      Palpations: Abdomen is soft. Comments: Moderate diffuse TTP. No rebound or guarding. No CVAT. Skin:     Findings: No erythema or rash. Neurological:      Mental Status: He is alert. Motor: No weakness. Comments: Residual left-sided weakness. MDM  Number of Diagnoses or Management Options  Acute cholecystitis: new and requires workup  Acute renal failure, unspecified acute renal failure type [facility]): new and requires workup  Pneumonia of right lower lobe due to infectious organism: new and requires workup  Sepsis, due to unspecified organism, unspecified whether acute organ dysfunction present [facility]): new and requires workup  Subphrenic abscess [facility]): new and requires workup  Diagnosis management comments: Patient febrile, tachycardic. Chest x-ray with evidence of right lower lobe pneumonia. Leukocytosis noted. Elevated procalcitonin noted  Patient with acute renal failure. Orders placed for IV fluid hydration, Zosyn, vancomycin. CT abdomen pelvis without contrast ordered.   Patient unable to tolerate po.  CT with evidence of acute cholecystitis, subphrenic abscess. General surgery consulted. Dr. Juancarlos Gipson on call. Request hospitalist admit given numerous other medical issues at this time. States medical management at this time. Right upper quadrant ultrasound pending. Hospitalist consulted for admission. Amount and/or Complexity of Data Reviewed  Clinical lab tests: ordered and reviewed  Tests in the radiology section of CPT®: ordered and reviewed  Tests in the medicine section of CPT®: ordered and reviewed  Review and summarize past medical records: yes  Independent visualization of images, tracings, or specimens: yes    Risk of Complications, Morbidity, and/or Mortality  Presenting problems: high  Diagnostic procedures: high  Management options: high  General comments: Pt critically ill. Patient Progress  Patient progress: stable    ED Course as of Oct 18 2317   Sun Oct 18, 2020   2110 CXR IMPRESSION: Right lower lobe atelectasis or pneumonia. [DF]   2258 CT abd/pelvis IMPRESSION:      Findings worrisome for acute calculus cholecystitis.     Possible subhepatic abscess.     Small amount of ascites in the right anterior subphrenic space.     Right lower lobe pneumonia.     Status post ASD closure.       Evaluation of the abdominal viscera is limited without intravenous contrast.    [DF]      ED Course User Index  [DF] Ericka Love MD       EKG    Date/Time: 10/18/2020 8:27 PM  Performed by: Ericka Love MD  Authorized by: Ericka Love MD     ECG reviewed by ED Physician in the absence of a cardiologist: yes    Rate:     ECG rate:  107    ECG rate assessment: tachycardic    Rhythm:     Rhythm: sinus tachycardia    Ectopy:     Ectopy: none    QRS:     QRS axis:  Normal    QRS intervals:  Normal  Conduction:     Conduction: normal    ST segments:     ST segments:  Normal  T waves:     T waves: normal      Critical Care  Performed by:  Ericka Love MD  Authorized by: Becka Woods MD     Critical care provider statement:     Critical care time (minutes):  40    Critical care was necessary to treat or prevent imminent or life-threatening deterioration of the following conditions:  Sepsis, metabolic crisis, dehydration, circulatory failure, renal failure and shock    Critical care was time spent personally by me on the following activities:  Blood draw for specimens, development of treatment plan with patient or surrogate, discussions with consultants, evaluation of patient's response to treatment, examination of patient, obtaining history from patient or surrogate, review of old charts, re-evaluation of patient's condition, pulse oximetry, ordering and performing treatments and interventions, ordering and review of laboratory studies and ordering and review of radiographic studies    I assumed direction of critical care for this patient from another provider in my specialty: yes              Regina Miner MD; 10/18/2020 @8:14 PM Voice dictation software was used during the making of this note. This software is not perfect and grammatical and other typographical errors may be present.   This note has not been proofread for errors.  ===================================================================

## 2020-10-20 PROBLEM — I16.0 HYPERTENSIVE URGENCY: Status: ACTIVE | Noted: 2020-10-20

## 2020-10-20 LAB
ALBUMIN SERPL-MCNC: 1.7 G/DL (ref 3.5–5)
ALBUMIN/GLOB SERPL: 0.4 {RATIO} (ref 1.2–3.5)
ALP SERPL-CCNC: 159 U/L (ref 50–136)
ALT SERPL-CCNC: 23 U/L (ref 12–65)
ANION GAP SERPL CALC-SCNC: 8 MMOL/L (ref 7–16)
AST SERPL-CCNC: 97 U/L (ref 15–37)
BASOPHILS # BLD: 0 K/UL (ref 0–0.2)
BASOPHILS NFR BLD: 0 % (ref 0–2)
BILIRUB SERPL-MCNC: 0.5 MG/DL (ref 0.2–1.1)
BUN SERPL-MCNC: 39 MG/DL (ref 6–23)
CALCIUM SERPL-MCNC: 8.7 MG/DL (ref 8.3–10.4)
CHLORIDE SERPL-SCNC: 116 MMOL/L (ref 98–107)
CO2 SERPL-SCNC: 22 MMOL/L (ref 21–32)
CREAT SERPL-MCNC: 2.39 MG/DL (ref 0.8–1.5)
DIFFERENTIAL METHOD BLD: ABNORMAL
EOSINOPHIL # BLD: 0 K/UL (ref 0–0.8)
EOSINOPHIL NFR BLD: 0 % (ref 0.5–7.8)
ERYTHROCYTE [DISTWIDTH] IN BLOOD BY AUTOMATED COUNT: 15.6 % (ref 11.9–14.6)
GLOBULIN SER CALC-MCNC: 4.5 G/DL (ref 2.3–3.5)
GLUCOSE BLD STRIP.AUTO-MCNC: 102 MG/DL (ref 65–100)
GLUCOSE BLD STRIP.AUTO-MCNC: 109 MG/DL (ref 65–100)
GLUCOSE BLD STRIP.AUTO-MCNC: 123 MG/DL (ref 65–100)
GLUCOSE BLD STRIP.AUTO-MCNC: 174 MG/DL (ref 65–100)
GLUCOSE SERPL-MCNC: 90 MG/DL (ref 65–100)
HCT VFR BLD AUTO: 25.9 % (ref 41.1–50.3)
HGB BLD-MCNC: 8.3 G/DL (ref 13.6–17.2)
IMM GRANULOCYTES # BLD AUTO: 0 K/UL (ref 0–0.5)
IMM GRANULOCYTES NFR BLD AUTO: 0 % (ref 0–5)
LYMPHOCYTES # BLD: 0.8 K/UL (ref 0.5–4.6)
LYMPHOCYTES NFR BLD: 9 % (ref 13–44)
MAGNESIUM SERPL-MCNC: 2.3 MG/DL (ref 1.8–2.4)
MCH RBC QN AUTO: 25.9 PG (ref 26.1–32.9)
MCHC RBC AUTO-ENTMCNC: 32 G/DL (ref 31.4–35)
MCV RBC AUTO: 80.7 FL (ref 79.6–97.8)
MONOCYTES # BLD: 0.7 K/UL (ref 0.1–1.3)
MONOCYTES NFR BLD: 8 % (ref 4–12)
NEUTS SEG # BLD: 7.3 K/UL (ref 1.7–8.2)
NEUTS SEG NFR BLD: 83 % (ref 43–78)
NRBC # BLD: 0 K/UL (ref 0–0.2)
PLATELET # BLD AUTO: 163 K/UL (ref 150–450)
PMV BLD AUTO: 11.4 FL (ref 9.4–12.3)
POTASSIUM SERPL-SCNC: 3.9 MMOL/L (ref 3.5–5.1)
PROT SERPL-MCNC: 6.2 G/DL (ref 6.3–8.2)
RBC # BLD AUTO: 3.21 M/UL (ref 4.23–5.6)
SODIUM SERPL-SCNC: 146 MMOL/L (ref 136–145)
VANCOMYCIN SERPL-MCNC: 14.8 UG/ML
WBC # BLD AUTO: 8.8 K/UL (ref 4.3–11.1)

## 2020-10-20 PROCEDURE — 97535 SELF CARE MNGMENT TRAINING: CPT

## 2020-10-20 PROCEDURE — 85025 COMPLETE CBC W/AUTO DIFF WBC: CPT

## 2020-10-20 PROCEDURE — 74011000258 HC RX REV CODE- 258: Performed by: SURGERY

## 2020-10-20 PROCEDURE — 74011250636 HC RX REV CODE- 250/636: Performed by: NURSE PRACTITIONER

## 2020-10-20 PROCEDURE — 92610 EVALUATE SWALLOWING FUNCTION: CPT

## 2020-10-20 PROCEDURE — 65270000029 HC RM PRIVATE

## 2020-10-20 PROCEDURE — 80053 COMPREHEN METABOLIC PANEL: CPT

## 2020-10-20 PROCEDURE — 74011636637 HC RX REV CODE- 636/637: Performed by: SURGERY

## 2020-10-20 PROCEDURE — 74011250637 HC RX REV CODE- 250/637: Performed by: SURGERY

## 2020-10-20 PROCEDURE — 74011250636 HC RX REV CODE- 250/636: Performed by: SURGERY

## 2020-10-20 PROCEDURE — 77030040361 HC SLV COMPR DVT MDII -B

## 2020-10-20 PROCEDURE — 80202 ASSAY OF VANCOMYCIN: CPT

## 2020-10-20 PROCEDURE — 2709999900 HC NON-CHARGEABLE SUPPLY

## 2020-10-20 PROCEDURE — 74011250637 HC RX REV CODE- 250/637: Performed by: HOSPITALIST

## 2020-10-20 PROCEDURE — 97166 OT EVAL MOD COMPLEX 45 MIN: CPT

## 2020-10-20 PROCEDURE — 36415 COLL VENOUS BLD VENIPUNCTURE: CPT

## 2020-10-20 PROCEDURE — 74011000250 HC RX REV CODE- 250: Performed by: HOSPITALIST

## 2020-10-20 PROCEDURE — 83735 ASSAY OF MAGNESIUM: CPT

## 2020-10-20 PROCEDURE — 82962 GLUCOSE BLOOD TEST: CPT

## 2020-10-20 PROCEDURE — 77030027138 HC INCENT SPIROMETER -A

## 2020-10-20 RX ORDER — HYDRALAZINE HYDROCHLORIDE 50 MG/1
25 TABLET, FILM COATED ORAL 3 TIMES DAILY
Status: DISCONTINUED | OUTPATIENT
Start: 2020-10-20 | End: 2020-10-21

## 2020-10-20 RX ORDER — HYDROCODONE BITARTRATE AND ACETAMINOPHEN 7.5; 325 MG/1; MG/1
1 TABLET ORAL
Status: DISCONTINUED | OUTPATIENT
Start: 2020-10-20 | End: 2020-10-24 | Stop reason: HOSPADM

## 2020-10-20 RX ORDER — METOPROLOL TARTRATE 5 MG/5ML
2.5 INJECTION INTRAVENOUS
Status: DISCONTINUED | OUTPATIENT
Start: 2020-10-20 | End: 2020-10-24 | Stop reason: HOSPADM

## 2020-10-20 RX ORDER — LABETALOL 100 MG/1
100 TABLET, FILM COATED ORAL 2 TIMES DAILY
Status: DISCONTINUED | OUTPATIENT
Start: 2020-10-20 | End: 2020-10-24 | Stop reason: HOSPADM

## 2020-10-20 RX ORDER — VANCOMYCIN HYDROCHLORIDE
1250 ONCE
Status: COMPLETED | OUTPATIENT
Start: 2020-10-21 | End: 2020-10-21

## 2020-10-20 RX ORDER — HEPARIN SODIUM 5000 [USP'U]/ML
5000 INJECTION, SOLUTION INTRAVENOUS; SUBCUTANEOUS EVERY 12 HOURS
Status: DISCONTINUED | OUTPATIENT
Start: 2020-10-20 | End: 2020-10-22

## 2020-10-20 RX ORDER — HYDROMORPHONE HYDROCHLORIDE 1 MG/ML
0.5 INJECTION, SOLUTION INTRAMUSCULAR; INTRAVENOUS; SUBCUTANEOUS
Status: DISCONTINUED | OUTPATIENT
Start: 2020-10-20 | End: 2020-10-24 | Stop reason: HOSPADM

## 2020-10-20 RX ADMIN — LABETALOL HYDROCHLORIDE 100 MG: 100 TABLET, FILM COATED ORAL at 12:21

## 2020-10-20 RX ADMIN — METOPROLOL TARTRATE 2.5 MG: 5 INJECTION INTRAVENOUS at 08:25

## 2020-10-20 RX ADMIN — Medication 10 ML: at 21:47

## 2020-10-20 RX ADMIN — HYDROMORPHONE HYDROCHLORIDE 1 MG: 1 INJECTION, SOLUTION INTRAMUSCULAR; INTRAVENOUS; SUBCUTANEOUS at 05:18

## 2020-10-20 RX ADMIN — HEPARIN SODIUM 5000 UNITS: 5000 INJECTION INTRAVENOUS; SUBCUTANEOUS at 21:48

## 2020-10-20 RX ADMIN — LABETALOL HYDROCHLORIDE 100 MG: 100 TABLET, FILM COATED ORAL at 17:26

## 2020-10-20 RX ADMIN — Medication 5 ML: at 12:24

## 2020-10-20 RX ADMIN — AZITHROMYCIN 500 MG: 500 INJECTION, POWDER, LYOPHILIZED, FOR SOLUTION INTRAVENOUS at 01:43

## 2020-10-20 RX ADMIN — SODIUM CHLORIDE, SODIUM LACTATE, POTASSIUM CHLORIDE, AND CALCIUM CHLORIDE 125 ML/HR: 600; 310; 30; 20 INJECTION, SOLUTION INTRAVENOUS at 06:32

## 2020-10-20 RX ADMIN — HYDROMORPHONE HYDROCHLORIDE 1 MG: 1 INJECTION, SOLUTION INTRAMUSCULAR; INTRAVENOUS; SUBCUTANEOUS at 09:39

## 2020-10-20 RX ADMIN — CEFEPIME HYDROCHLORIDE 1 G: 1 INJECTION, POWDER, FOR SOLUTION INTRAMUSCULAR; INTRAVENOUS at 08:55

## 2020-10-20 RX ADMIN — HYDRALAZINE HYDROCHLORIDE 25 MG: 50 TABLET, FILM COATED ORAL at 09:36

## 2020-10-20 RX ADMIN — HYDRALAZINE HYDROCHLORIDE 25 MG: 50 TABLET, FILM COATED ORAL at 17:26

## 2020-10-20 RX ADMIN — AZITHROMYCIN 500 MG: 500 INJECTION, POWDER, LYOPHILIZED, FOR SOLUTION INTRAVENOUS at 23:44

## 2020-10-20 RX ADMIN — ACETAMINOPHEN 650 MG: 325 TABLET, FILM COATED ORAL at 20:34

## 2020-10-20 RX ADMIN — INSULIN LISPRO 2 UNITS: 100 INJECTION, SOLUTION INTRAVENOUS; SUBCUTANEOUS at 18:00

## 2020-10-20 RX ADMIN — SODIUM CHLORIDE, SODIUM LACTATE, POTASSIUM CHLORIDE, AND CALCIUM CHLORIDE 125 ML/HR: 600; 310; 30; 20 INJECTION, SOLUTION INTRAVENOUS at 12:21

## 2020-10-20 RX ADMIN — HYDRALAZINE HYDROCHLORIDE 25 MG: 50 TABLET, FILM COATED ORAL at 21:46

## 2020-10-20 RX ADMIN — ATORVASTATIN CALCIUM 80 MG: 80 TABLET, FILM COATED ORAL at 21:45

## 2020-10-20 RX ADMIN — Medication 10 ML: at 05:19

## 2020-10-20 NOTE — PROGRESS NOTES
Pharmacokinetic Consult to Pharmacist    Miladis Arevalo. is a 50 y.o. male being treated  with vancomycin. Height: 5' 6\" (167.6 cm)  Weight: 72.6 kg (160 lb)  Lab Results   Component Value Date/Time    BUN 39 (H) 10/20/2020 04:30 AM    Creatinine 2.39 (H) 10/20/2020 04:30 AM    WBC 8.8 10/20/2020 04:30 AM    Procalcitonin 114.66 10/18/2020 08:19 PM    Lactic acid 1.6 10/18/2020 08:19 PM      Estimated Creatinine Clearance: 34.5 mL/min (A) (based on SCr of 2.39 mg/dL (H)). Day 3 of vancomycin. Goal trough is 15-20. Random level of 14.2 (10/19 @2052) giving an estimated trough of approximately 13. Recommend to proceed with vancomycin 1250mg Q24H based on protocol for his improving renal function. Consider collecting a trough 10/20 at @ 2300 (1 hr prior to the 3rd dose). Redose as indicated. If renal function continues to improve over the next 24 hours. Will continue to follow patient and order levels when clinically indicated.     Thank you,  Blas Perez   PharmD Candidate  [facility]

## 2020-10-20 NOTE — PROGRESS NOTES
Attempted PT however Mr. Misa Samuels refused. \"I have been up once today. I am not going to do PT today.   \"    Charis Pierce, PT

## 2020-10-20 NOTE — PROGRESS NOTES
Elis Divina. Admission Date: 10/18/2020             Renal Daily Progress Note: 10/20/2020    The patient's chart is reviewed and the patient is discussed with the staff. Follow up [facility]  Subjective:   Patient seen and examined on rounds, pt reports n/v improved, still with abdominal tenderness.  Good uop  Labs and chart reviewed- renal function improving, s/p drainage of perihepatic abscess 10/19    ROS:  General: + fever temp 99.5, no chills, appetite ok  CV: no CP  Lung: no SOB, no cough  GI: + abd tenderness, no N/V/D  Ext: no edema, left sided residual weakness     Current Facility-Administered Medications   Medication Dose Route Frequency    metoprolol (LOPRESSOR) injection 2.5 mg  2.5 mg IntraVENous Q6H PRN    hydrALAZINE (APRESOLINE) tablet 25 mg  25 mg Oral TID    HYDROmorphone (PF) (DILAUDID) injection 0.5 mg  0.5 mg IntraVENous Q4H PRN    azithromycin (ZITHROMAX) 500 mg in 0.9% sodium chloride (MBP/ADV) 250 mL  500 mg IntraVENous Q24H    atorvastatin (LIPITOR) tablet 80 mg  80 mg Oral QHS    sodium chloride (NS) flush 5-40 mL  5-40 mL IntraVENous Q8H    sodium chloride (NS) flush 5-40 mL  5-40 mL IntraVENous PRN    acetaminophen (TYLENOL) tablet 650 mg  650 mg Oral Q6H PRN    Or    acetaminophen (TYLENOL) suppository 650 mg  650 mg Rectal Q6H PRN    senna (SENOKOT) tablet 8.6 mg  1 Tab Oral DAILY PRN    insulin lispro (HUMALOG) injection   SubCUTAneous Q6H    lactated Ringers infusion  125 mL/hr IntraVENous CONTINUOUS    Vancomycin Intermittent Dosing   Other Rx Dosing/Monitoring    cefepime (MAXIPIME) 1 g in 0.9% sodium chloride (MBP/ADV) 50 mL  1 g IntraVENous Q24H    influenza vaccine 2020-21 (6 mos+)(PF) (FLUARIX/FLULAVAL/FLUZONE QUAD) injection 0.5 mL  0.5 mL IntraMUSCular PRIOR TO DISCHARGE         Objective:     Vitals:    10/20/20 0321 10/20/20 0740 10/20/20 0918 10/20/20 0923   BP: (!) 149/94 (!) 161/100 (!) 194/120 (!) 168/111   Pulse: (!) 123 (!) 125 (!) 108    Resp: 20 18     Temp: 99.6 °F (37.6 °C) 99.5 °F (37.5 °C)     SpO2: 94% 96% 96%    Weight:       Height:         Intake and Output:   10/18 1901 - 10/20 0700  In: 5100 [I. P.:3838]  Out: 6337 [Urine:1325; Drains:170]  10/20 0701 - 10/20 1900  In: -   Out: 650 [Urine:650]    Physical Exam:   Constitutional:  the patient is well developed and in no acute distress, alert and oriented  HEENT:  Sclera clear, pupils equal, oral mucosa moist  Lungs: clear bilaterally   Cardiovascular:  tachycardia, S1, S2, no Rub  Abd/GI: soft, + tender on palpation with positive bowel sounds. Ext: warm without cyanosis. There is no lower leg edema. Skin:  no jaundice or rashes  Neuro: left hemiparesis  Psychiatric: Calm. LAB  Recent Labs     10/20/20  0430 10/19/20  0639 10/18/20  2019   WBC 8.8 9.8 12.5*   HGB 8.3* 10.5* 8.7*   HCT 25.9* 33.3* 27.5*    191 198   INR  --   --  1.1     Recent Labs     10/20/20  0430 10/19/20  0639 10/18/20  2019   * 144 142   K 3.9 4.3 4.2   * 112* 109*   CO2 22 19* 23   GLU 90 63* 82   BUN 39* 62* 73*   CREA 2.39* 4.81* 6.18*   MG 2.3  --  2.3   ALB 1.7* 1.7* 1.6*     No results for input(s): PH, PCO2, PO2, HCO3 in the last 72 hours.       Assessment:  (Medical Decision Making)     Hospital Problems  Date Reviewed: 3/3/2017          Codes Class Noted POA    History of CVA (cerebrovascular accident) ICD-10-CM: Z86.73  ICD-9-CM: V12.54  10/19/2020 Unknown        * (Principal) Abscess of abdominal cavity ([facility] ICD-10-CM: K65.1  ICD-9-CM: 567.22  10/19/2020 Unknown        RLL pneumonia ICD-10-CM: J18.9  ICD-9-CM: 486  10/19/2020 Unknown        Left hemiparesis ([facility] ICD-10-CM: G81.94  ICD-9-CM: 342.90  10/19/2020 Unknown        Sepsis ([facility] ICD-10-CM: A41.9  ICD-9-CM: 038.9, 995.91  10/19/2020 Unknown        Tachycardia ICD-10-CM: R00.0  ICD-9-CM: 785.0  10/19/2020 Unknown        LOKI (acute kidney injury) ([facility] ICD-10-CM: N17.9  ICD-9-CM: 584.9  10/19/2020 Unknown Hypertension (Chronic) ICD-10-CM: I10  ICD-9-CM: 401.9  Unknown Yes              Plan:  (Medical Decision Making)   1. LOKI likely 2/2 pre renal azotemia in the setting of nausea, vomiting, Cr baseline 0.9-1.3. no evidence of obstructive nephropathy on renal imaging  Cr continues to improve with IVF, no indication for acute RRT, trend renal indices with strict I&O     2. Right subhepatic fluid collection/ acute calculus cholecystitis  -s/p open cholecystectomy with perihepatic abscess drainage per Dr. Oksana Marsh on 10/19  vanc level 14.2     3. RLL pneumonia Abx per primary      4. Hx CVA with Left hemiparesis      5. DM type II SSI per hosp     6.  HTN on hydralazine titrate prn    Treasure Moise NP

## 2020-10-20 NOTE — PROGRESS NOTES
Attempted to get pt OOB  Refused stating he \"will try later\"  Explained importance  Hesitant to move  Medicated for pain  Pt will state pain is a 9 and in a few minutes say it is a 0 or say \"it's ok\"  Will continue to attempt to get OOB and will start IS if possible

## 2020-10-20 NOTE — PROGRESS NOTES
Problem: Self Care Deficits Care Plan (Adult)  Goal: *Acute Goals and Plan of Care (Insert Text)  Outcome: Progressing Towards Goal  Note: 1. Pt will toilet with CGA   2. Pt will complete functional mobility for ADLs with min A  3. Pt will complete upper body dressing with min A using AE as needed  4. Pt will complete grooming and hygiene with set up   5. Pt will demonstrate independence with HEP to promote increased BUE strength and functional use for ADLs  6. Pt will tolerate 23 minutes functional activity with min or fewer rest breaks to promote increased endurance for ADLs  7. Pt will complete bed mobility with CGA in prep for ADLs    Timeframe: 7 days       OCCUPATIONAL THERAPY: Initial Assessment and Daily Note 10/20/2020  INPATIENT: OT Visit Days: 1  Payor: [address] [place], [place] [address]y [address] / Plan: [place] / Product Type: Medicaid /      NAME/AGE/GENDER: Angela Mckeon is a 50 y.o. male   PRIMARY DIAGNOSIS:  Sepsis ([facility] [A41.9] Abscess of abdominal cavity ([facility] Abscess of abdominal cavity (HCC)  Procedure(s) (LRB):  ATEMPTED LAPAROSCOPIC CHOLECYSTECTOMY CONVERTED TO OPEN (N/A)  1 Day Post-Op  ICD-10: Treatment Diagnosis:    Generalized Muscle Weakness (M62.81)   Precautions/Allergies:    falls Patient has no known allergies. ASSESSMENT:     Mr. Alejo Bender presents with sepsis with multi organ failure d/t gangrenous cholecystitis, s/p the above procedure. Pt has a hx of CVA with L hemiparesis and is s/p recent prolonged hospitalization. Pt reports that he has been at [facility] home\" since last d/c and requires assistance for bathing and dressing but is independent with toileting, grooming/hygiene, and feeding. Pt stated that he is able to complete bed mobility and is ambulatory with a RW w/o assistance. This session, pt presented generally weak and deconditioned with deficits in mobility, balance, endurance, and strength impacting ADLs. LUE is non-functional d/t prior CVA. Pt educated on abdominal precautions and on adaptive techniques to maintain during ADLs and mobility for ADLs. Pt required max-total A for transfer from supine to sitting via log roll, mod A to return to supine. CGA for sitting balance, declined standing. Total A to don/ doff socks, CGA for simple grooming and hygiene. Pt is below his functional baseline and would benefit from skilled OT services to address deficits. Recommend d/c back to STR when medically stable. This section established at most recent assessment   PROBLEM LIST (Impairments causing functional limitations):  Decreased Strength  Decreased ADL/Functional Activities  Decreased Transfer Abilities  Decreased Balance  Increased Pain  Decreased Activity Tolerance  Increased Fatigue  Decreased Flexibility/Joint Mobility  Decreased Cognition   INTERVENTIONS PLANNED: (Benefits and precautions of occupational therapy have been discussed with the patient.)  Activities of daily living training  Adaptive equipment training  Balance training  Therapeutic activity  Therapeutic exercise     TREATMENT PLAN: Frequency/Duration: Follow patient 3 times/ week to address above goals. Rehabilitation Potential For Stated Goals: [facility] (at time of discharge pending progress):    Placement: It is my opinion, based on this patient's performance to date, that Mr. Christine Choudhury may benefit from d/c back to STR. Equipment:   None at this time              [facility]:   History of Present Injury/Illness (Reason for Referral):  See H&P  Past Medical History/Comorbidities:   Mr. Christine Choudhury  has a past medical history of Acute ischemic stroke ([address] (12/12/2019), Acute pancreatitis (11/19/2014), Cerebrovascular accident (CVA) due to embolism ([address] (12/12/2019), Diabetes ([address] (2002), Diabetes ([address] Diabetes mellitus, and Hypertension.  He also has no past medical history of Arthritis, Asthma, Autoimmune disease ([address] CAD (coronary artery disease), Cancer ([facility] Chronic kidney disease, COPD, Dementia, Dementia ([facility] Heart failure ([facility] Ill-defined condition, Infectious disease, Liver disease, Other ill-defined conditions(799.89), Psychiatric disorder, PUD (peptic ulcer disease), Seizures ([facility] or Sleep disorder. Mr. Christine Choudhury  has a past surgical history that includes hx hernia repair and hx orthopaedic. Social History/Living Environment:   Home Environment: Skilled nursing facility  One/Two Story Residence: One story  Living Alone: No  Support Systems: Skilled nursing facility  Patient Expects to be Discharged to[de-identified] [address] [place] [address]  Current DME Used/Available at Home: tiffany Saravia, [facility] chair  Prior Level of Function/Work/Activity:  See H&P     Number of Personal Factors/Comorbidities that affect the Plan of Care: Expanded review of therapy/medical records (1-2):  MODERATE COMPLEXITY   ASSESSMENT OF OCCUPATIONAL PERFORMANCE[de-identified]   Activities of Daily Living:   Basic ADLs (From Assessment) Complex ADLs (From Assessment)   Feeding: Minimum assistance  Oral Facial Hygiene/Grooming: Minimum assistance  Bathing: Maximum assistance  Upper Body Dressing: Moderate assistance  Lower Body Dressing: Total assistance  Toileting: Total assistance Instrumental ADL  Meal Preparation: Total assistance  Homemaking: Total assistance   Grooming/Bathing/Dressing Activities of Daily Living   Grooming  Grooming Assistance: Minimum assistance                         Bed/Mat Mobility  Supine to Sit: Maximum assistance; Total assistance  Sit to Supine: Moderate assistance  Scooting:  Moderate assistance     Most Recent Physical Functioning:   Gross Assessment:  AROM: Generally decreased, functional  Strength: Generally decreased, functional  Coordination: Generally decreased, functional               Posture:     Balance:  Sitting: Impaired  Sitting - Static: Good (unsupported)  Sitting - Dynamic: Fair (occasional) Bed Mobility:  Supine to Sit: Maximum assistance; Total assistance  Sit to Supine: Moderate assistance  Scooting: Moderate assistance  Wheelchair Mobility:     Transfers:               Patient Vitals for the past 6 hrs:   BP BP Patient Position SpO2 Pulse   10/20/20 0923 (!) 168/111 -- -- (!) 106   10/20/20 1013 (!) 172/106 -- -- (!) 112   10/20/20 1130 (!) 168/105 -- -- (!) 113   10/20/20 1152 (!) 187/99 At rest 97 % (!) 113   10/20/20 1221 (!) 167/109 -- -- Santi Ream 118       Mental Status  Neurologic State: Drowsy  Orientation Level: Oriented to person, Oriented to place, Oriented to situation  Cognition: Follows commands, Decreased attention/concentration                          Physical Skills Involved:  Range of Motion  Balance  Strength  Activity Tolerance  Pain (acute) Cognitive Skills Affected (resulting in the inability to perform in a timely and safe manner):  Executive Function  Sustained Attention  Divided Attention Psychosocial Skills Affected:  Habits/Routines   Number of elements that affect the Plan of Care: 5+:  HIGH COMPLEXITY   CLINICAL DECISION MAKIN[address] [place] [id number] AM-PAC 6 Clicks   Daily Activity Inpatient Short Form  How much help from another person does the patient currently need. .. Total A Lot A Little None   1. Putting on and taking off regular lower body clothing? [x] 1   [] 2   [] 3   [] 4   2. Bathing (including washing, rinsing, drying)? [] 1   [x] 2   [] 3   [] 4   3. Toileting, which includes using toilet, bedpan or urinal?   [] 1   [x] 2   [] 3   [] 4   4. Putting on and taking off regular upper body clothing? [] 1   [x] 2   [] 3   [] 4   5. Taking care of personal grooming such as brushing teeth? [] 1   [] 2   [x] 3   [] 4   6. Eating meals? [] 1   [] 2   [x] 3   [] 4   © , Trustees of [address] [place] [id number], under license to [employer].  All rights reserved      Score:  Initial: 13 Most Recent: X (Date: -- )    Interpretation of Tool:  Represents activities that are increasingly more difficult (i.e. Bed mobility, Transfers, Gait). Medical Necessity:     Patient is expected to demonstrate progress in   strength, balance, and functional technique   to   decrease assistance required with ADLs  . Reason for Services/Other Comments:  Patient   continues to require present interventions due to patient's inability to complete ADLs  . Use of outcome tool(s) and clinical judgement create a POC that gives a: MODERATE COMPLEXITY         TREATMENT:   (In addition to Assessment/Re-Assessment sessions the following treatments were rendered)     Pre-treatment Symptoms/Complaints:    Pain: Initial:   Pain Intensity 1: 8  Pain Location 1: Abdomen  Pain Intervention(s) 1: Nurse notified  Post Session:  8     Self Care: (8 min): Procedure(s) (per grid) utilized to improve and/or restore self-care/home management as related to dressing, grooming, and self feeding. Required minimal   cueing to facilitate activities of daily living skills and compensatory activities. Braces/Orthotics/Lines/Etc:   O2 Device: Nasal cannula  Treatment/Session Assessment:    Response to Treatment:  fatigue  Interdisciplinary Collaboration:   Occupational Therapist  Registered Nurse  After treatment position/precautions:   Supine in bed  Bed alarm/tab alert on  Bed/Chair-wheels locked  Bed in low position  Call light within reach  RN notified   Compliance with Program/Exercises: Will assess as treatment progresses. Recommendations/Intent for next treatment session: \"Next visit will focus on advancements to more challenging activities and reduction in assistance provided\".   Total Treatment Duration:  OT Patient Time In/Time Out  Time In: 1328  Time Out: 1301 [facility]

## 2020-10-20 NOTE — PROGRESS NOTES
Physician Progress Note      PATIENT:               Emigdio Gonzalez  CSN #:                  [id number]  :                       1975  ADMIT DATE:       10/18/2020 7:55 PM  [address] [place] DATE:  RESPONDING  PROVIDER #:        Betty KAMARA MD          QUERY TEXT:    Pt admitted with Abd pain,  acute cholecystitis, sepsis, abd abscess. Pt noted to have RUL infiltrate per HP. If possible, please document in the progress notes and discharge summary if you are evaluating and/or treating any of the following: The medical record reflects the following:  Risk Factors: vomiting,  hx of stroke  Clinical Indicators: Crackles RLL other wise CTAB, 10/18 CXR RLL atelectasis or PNE,10/18  CT ABD RLL PNE,  Treatment: Monitoring, Zosyn IV, Vanc    Thank you,  If you have any questions contact me: [email or web address]  Stefania Knowles RN,C BSN  Clinical   Options provided:  -- Gram negative pneumonia  -- Bacterial pneumonia  -- Aspiration pneumonia  -- Other - I will add my own diagnosis  -- Disagree - Not applicable / Not valid  -- Disagree - Clinically unable to determine / Unknown  -- Refer to Clinical Documentation Reviewer    PROVIDER RESPONSE TEXT:    This patient has aspiration pneumonia. Query created by:  Dorian Lucas on 10/20/2020 12:36 PM      Electronically signed by:  Yannick Ashford MD 10/20/2020 12:44 PM

## 2020-10-20 NOTE — PROGRESS NOTES
H&P/Consult Note/Progress Note/Office Note:   Katia Olsen MRN: [id number]  :1975  Age:45 y.o.    HPI: Katia Olsen is a 50 y.o. male who has PMHx of stroke, HTN, DM, who we are asked by hospitalist to see for intraabdominal abscess. He was transferred from his nursing home  on 10/18 due to N/V and abdominal pain. In the ED, pt was found to have WBC 12 and fever. He was started on abx. Tbili 0.6, Alk Phos and AST elevated. CT abdomen revealed possible cholecystitis which was confirmed on US. General surgery was consulted for evaluation of cholecystitis with subhepatic abscess. 10/20/20 Patient in bed this AM. Open cholecystectomy yesterday. Dressing C/D/I, ashley drain maintained. Tachy and hypertensive, will add better pain control. US 10/19/2020:  IMPRESSION: Findings consistent with acute cholecystitis.     Right subhepatic fluid collection as seen on prior CT.  Possibly represent an  abscess.     Hepatomegaly with diffuse fatty change.       CTAP 10/18/2020:  IMPRESSION:      Findings worrisome for acute calculus cholecystitis.     Possible subhepatic abscess.     Small amount of ascites in the right anterior subphrenic space.     Right lower lobe pneumonia.     Status post ASD closure.       Evaluation of the abdominal viscera is limited without intravenous contrast.    Past Medical History:   Diagnosis Date    Acute ischemic stroke ([address] 2019    Acute pancreatitis 2014    Cerebrovascular accident (CVA) due to embolism ([address] 2019    Diabetes ([address] 2002    Diabetes ([address]     Diabetes mellitus     Hypertension      Past Surgical History:   Procedure Laterality Date    HX HERNIA REPAIR      HX ORTHOPAEDIC      carpal tunnel surgery     Current Facility-Administered Medications   Medication Dose Route Frequency    metoprolol (LOPRESSOR) injection 2.5 mg  2.5 mg IntraVENous Q6H PRN    hydrALAZINE (APRESOLINE) tablet 25 mg  25 mg Oral TID    HYDROmorphone (PF) (DILAUDID) injection 0.5 mg  0.5 mg IntraVENous Q4H PRN    HYDROcodone-acetaminophen (NORCO) 7.5-325 mg per tablet 1 Tab  1 Tab Oral Q4H PRN    azithromycin (ZITHROMAX) 500 mg in 0.9% sodium chloride (MBP/ADV) 250 mL  500 mg IntraVENous Q24H    atorvastatin (LIPITOR) tablet 80 mg  80 mg Oral QHS    sodium chloride (NS) flush 5-40 mL  5-40 mL IntraVENous Q8H    sodium chloride (NS) flush 5-40 mL  5-40 mL IntraVENous PRN    acetaminophen (TYLENOL) tablet 650 mg  650 mg Oral Q6H PRN    Or    acetaminophen (TYLENOL) suppository 650 mg  650 mg Rectal Q6H PRN    senna (SENOKOT) tablet 8.6 mg  1 Tab Oral DAILY PRN    insulin lispro (HUMALOG) injection   SubCUTAneous Q6H    lactated Ringers infusion  125 mL/hr IntraVENous CONTINUOUS    Vancomycin Intermittent Dosing   Other Rx Dosing/Monitoring    cefepime (MAXIPIME) 1 g in 0.9% sodium chloride (MBP/ADV) 50 mL  1 g IntraVENous Q24H    influenza vaccine 2020-21 (6 mos+)(PF) (FLUARIX/FLULAVAL/FLUZONE QUAD) injection 0.5 mL  0.5 mL IntraMUSCular PRIOR TO DISCHARGE     Patient has no known allergies.   Social History     Socioeconomic History    Marital status:      Spouse name: Not on file    Number of children: Not on file    Years of education: Not on file    Highest education level: Not on file   Tobacco Use    Smoking status: Former Smoker     Packs/day: 0.25     Types: Cigarettes     Last attempt to quit: 2019     Years since quittin.8    Smokeless tobacco: Former User     Types: Chew   Substance and Sexual Activity    Alcohol use: No    Drug use: No    Sexual activity: Yes     Partners: Female     Birth control/protection: None     Social History     Tobacco Use   Smoking Status Former Smoker    Packs/day: 0.25    Types: Cigarettes    Last attempt to quit: 2019    Years since quittin.8   Smokeless Tobacco Former User    Types: Chew     Family History   Problem Relation Age of Onset    Diabetes Mother     Diabetes Father      ROS: The patient has no difficulty with chest pain or shortness of breath. No fever or chills. Comprehensive review of systems was otherwise unremarkable except as noted above. Physical Exam:   Visit Vitals  BP (!) 168/111   Pulse (!) 108   Temp 99.5 °F (37.5 °C)   Resp 18   Ht 5' 6\" (1.676 m)   Wt 160 lb (72.6 kg)   SpO2 96%   BMI 25.82 kg/m²     Constitutional: Alert, oriented, cooperative patient in no acute distress; appears stated age    Eyes:Sclera are clear. EOMs intact  ENMT: no external lesions gross hearing normal; no obvious neck masses, no ear or lip lesions, nares normal  CV: RRR. Normal perfusion  Resp: No JVD. Breathing is  non-labored; no audible wheezing. GI: soft and non-distended; appropriately tender.  Dressing C/D/I ashley drain maintained   Musculoskeletal: left hemiparesis  Neuro:  Oriented;left hemiparesis  Psychiatric: normal affect and mood, no memory impairment    Recent vitals (if inpt):  Patient Vitals for the past 24 hrs:   BP Temp Pulse Resp SpO2   10/20/20 0923 (!) 168/111       10/20/20 0918 (!) 194/120  (!) 108  96 %   10/20/20 0740 (!) 161/100 99.5 °F (37.5 °C) (!) 125 18 96 %   10/20/20 0321 (!) 149/94 99.6 °F (37.6 °C) (!) 123 20 94 %   10/19/20 2330 (!) 148/89 98.2 °F (36.8 °C) (!) 102 19 97 %   10/19/20 1932 (!) 166/92 98.6 °F (37 °C) (!) 117 18 99 %   10/19/20 1917 (!) 163/91 98.7 °F (37.1 °C) (!) 103 16 98 %   10/19/20 1817 (!) 161/89 98.5 °F (36.9 °C) (!) 101 16 100 %   10/19/20 1812 (!) 161/87  (!) 101 16 100 %   10/19/20 1807 (!) 161/85  (!) 101 16 100 %   10/19/20 1802 (!) 155/87  (!) 104 16 100 %   10/19/20 1758 (!) 162/89  (!) 103 16 100 %   10/19/20 1753 (!) 162/87  (!) 105 16 99 %   10/19/20 1748 (!) 161/88  (!) 110 16 97 %   10/19/20 1744 (!) 164/88 98 °F (36.7 °C) (!) 109 14 100 %   10/19/20 1445 (!) 164/90 (!) 100.6 °F (38.1 °C) (!) 133 30 (!) 81 %   10/19/20 1159 (!) 158/95 99.6 °F (37.6 °C) (!) 121 18 96 % Labs:  Recent Labs     10/20/20  0430  10/18/20  2019   WBC 8.8   < > 12.5*   HGB 8.3*   < > 8.7*      < > 198   *   < > 142   K 3.9   < > 4.2   *   < > 109*   CO2 22   < > 23   BUN 39*   < > 73*   CREA 2.39*   < > 6.18*   GLU 90   < > 82   PTP  --   --  14.4   INR  --   --  1.1   APTT  --   --  35.1   TBILI 0.5   < > 0.5   ALT 23   < > 19   *   < > 127   LPSE  --   --  277    < > = values in this interval not displayed. Lab Results   Component Value Date/Time    WBC 8.8 10/20/2020 04:30 AM    HGB 8.3 (L) 10/20/2020 04:30 AM    PLATELET 764 43/18/0245 04:30 AM    Sodium 146 (H) 10/20/2020 04:30 AM    Potassium 3.9 10/20/2020 04:30 AM    Chloride 116 (H) 10/20/2020 04:30 AM    CO2 22 10/20/2020 04:30 AM    BUN 39 (H) 10/20/2020 04:30 AM    Creatinine 2.39 (H) 10/20/2020 04:30 AM    Glucose 90 10/20/2020 04:30 AM    INR 1.1 10/18/2020 08:19 PM    aPTT 35.1 10/18/2020 08:19 PM    Bilirubin, total 0.5 10/20/2020 04:30 AM    ALT (SGPT) 23 10/20/2020 04:30 AM    Alk. phosphatase 159 (H) 10/20/2020 04:30 AM    Lipase 277 10/18/2020 08:19 PM    Troponin-I, Qt. <0.04 11/19/2014 04:10 PM       I reviewed recent labs and recent radiologic studies. I independently reviewed radiology images for studies I described above or studies I have ordered.    Admission date (for inpatients): 10/18/2020   * No surgery date entered *  Procedure(s):  ATEMPTED LAPAROSCOPIC CHOLECYSTECTOMY CONVERTED TO OPEN    ASSESSMENT/PLAN:  Problem List  Date Reviewed: 3/3/2017          Codes Class Noted    History of CVA (cerebrovascular accident) ICD-10-CM: Z86.73  ICD-9-CM: V12.54  10/19/2020        * (Principal) Abscess of abdominal cavity ([facility] ICD-10-CM: K65.1  ICD-9-CM: 567.22  10/19/2020        RLL pneumonia ICD-10-CM: J18.9  ICD-9-CM: 486  10/19/2020        Left hemiparesis ([facility] ICD-10-CM: G81.94  ICD-9-CM: 342.90  10/19/2020        Sepsis ([facility] ICD-10-CM: A41.9  ICD-9-CM: 038.9, 995.91  10/19/2020 Tachycardia ICD-10-CM: R00.0  ICD-9-CM: 785.0  10/19/2020        LOKI (acute kidney injury) ([facility] ICD-10-CM: N17.9  ICD-9-CM: 584.9  10/19/2020        Hypomagnesemia ICD-10-CM: E83.42  ICD-9-CM: 275.2  3/7/2020        PFO (patent foramen ovale) ICD-10-CM: Q21.1  ICD-9-CM: 745.5  12/17/2019        Arrhythmia ICD-10-CM: I49.9  ICD-9-CM: 427.9  12/15/2019        Hyperlipemia ICD-10-CM: E78.5  ICD-9-CM: 272.4  12/15/2019        Acute embolic stroke [facility]) [facility]-19-[facility]: I63.9  ICD-9-CM: 434.11  12/12/2019        Microcytic anemia ICD-10-CM: D50.9  ICD-9-CM: 280.9  11/20/2014        Acute pancreatitis ICD-10-CM: K85.90  ICD-9-CM: 600.0  11/19/2014        GERD (gastroesophageal reflux disease) (Chronic) ICD-10-CM: K21.9  ICD-9-CM: 530.81  11/19/2014        Hypertension (Chronic) ICD-10-CM: I10  ICD-9-CM: 401.9  Unknown        Type 2 diabetes mellitus (HCC) (Chronic) ICD-10-CM: E11.9  ICD-9-CM: 250.00  Unknown            Principal Problem:    Abscess of abdominal cavity ([facility] (10/19/2020)    Active Problems:    Hypertension ()      History of CVA (cerebrovascular accident) (10/19/2020)      RLL pneumonia (10/19/2020)      Left hemiparesis ([facility] (10/19/2020)      Sepsis ([facility] (10/19/2020)      Tachycardia (10/19/2020)      LOKI (acute kidney injury) ([facility] (10/19/2020)       Plan:    Heparin SQ will plan to restart Plavix in couple days  PO pain medication  OOB  Consult PT/OT  DC Ann  Continue antibiotics    Neal Mccarty NP

## 2020-10-20 NOTE — OP NOTES
[facility]  OPERATIVE REPORT    Name:  Johanna Apple  MR#:  [id number]  :  1975  ACCOUNT #:  [de-identified]  DATE OF SERVICE:  10/19/2020    PREOPERATIVE DIAGNOSIS:  Acute cholecystitis with sepsis. POSTOPERATIVE DIAGNOSIS:  Acute gangrenous cholecystitis with abscess and sepsis. PROCEDURES PERFORMED:  1. Laparoscopic converted to open cholecystectomy. 2.  Drainage of perihepatic abscess. SURGEON:  Siena Magaña MD    ASSISTANT:  Neal Mccarty NP    ANESTHESIA:  General.    COMPLICATIONS: none    SPECIMENS REMOVED: as above    IMPLANTS:  Richard x 1    ESTIMATED BLOOD LOSS:  About 50 mL. INDICATION:  This is a 75-year-old male who presented to the emergency room with multiorgan failure, sepsis and imaging showed the source potentially to be acute cholecystitis. He was immediately admitted to the hospital for IV antibiotics, IV hydration. His condition was stabilized and then emergent surgery offered to him and he understood the risks and benefits, agreed to proceed. As noted, the patient was on Plavix due to history of stroke and the last dose was one day ago; however, due to the sepsis, emergent surgery is required. FINDINGS:  He has near total gangrenous gallbladder with necrosis extending into the cystic duct and with a large amount of cloudy ascites and abscess around it and this required open surgery for safety. PROCEDURE:  After informed consent obtained, the patient brought into the operating room, left in supine position. General anesthesia was administered. The patient was then prepped and draped in the routine fashion. We first placed an infraumbilical port. However, there was a question whether he had a mesh or not. Then, I used the Veress needle technique to create pneumoperitoneum and then I placed a 5 mm trocar in the right upper quadrant and the peritoneal cavity was entered under direct vision. Then, the umbilical site was inspected.   I do not believe there was a mesh and then the Enriqueta cannula was able to be finished. Then, I placed another two 5-mm trocar in the right upper quadrant and immediately, we encountered a large amount of fluid and ascites and in the right upper quadrant, the gallbladder was surrounded by omentum. The omentum was peeled off and the gallbladder was visualized nearly completely necrotic. I did decompress the gallbladder and so we were able to grab and retract. Further examination showed the necrosis extended all the way into the cystic duct and at this point, I do not feel comfortable proceeding with laparoscopy and a decision was quickly made to convert this case to open. The right upper quadrant port site was connected to a subcostal laparotomy incision. Peritoneal cavity was entered and the Bookwalter retractor was placed and then the gallbladder was first dissected off the liver with cautery device. There were extensive adhesions around it and with careful dissections, we were able to dissect all the way down to the cystic duct and the beginning of the duct I thought looked necrotic. I was able to isolate the duct all the way down to the common bile duct and then I was able to put a clamp and then tied with 0 silk. As noted, cystic artery was very fibrotic and we stayed with dissection right along the gallbladder wall and then the small cystic artery was cauterized and then the gallbladder was removed. Then, we further cleaned out the abscess and cloudy ascites along the right diaphragm and perihepatic space and then copious irrigation was used and then #19 TK was left in place. I did place a piece of Surgicel on the gallbladder fossa due to the ooziness. Then, the incision closed with 0 Vicryl on the posterior fascia and the #1 looped PDS on the anterior fascia in running fashion. Skin closed with staples. As noted, umbilical port was also closed on the fascia with 0 Vicryl stitch.   All skin incision closed with staples. The patient tolerated the procedure well, transferred to recovery room in stable condition. All the instrument count and lap count were correct. As noted, Kate Luo is the first assistant in this case. This case is quite challenging due to the extensive inflammation and it would be extremely difficult to perform this case without her assistance.       Mojgan Welsh MD      [id number]  D:  10/19/2020 18:32  T:  10/20/2020 5:15  JOB #:  [id number]

## 2020-10-20 NOTE — PROGRESS NOTES
Hospitalist Progress Note    10/20/2020  Admit Date: 10/18/2020  7:55 PM   NAME: Julius Tapia. :  1975   MRN:  [id number]   Attending: Roseanne Abel MD  PCP:  Adam Romero MD    SUBJECTIVE:     Julius Tapia. is a 50years old male with hx of CVA with left residual weakness, HTN, PFO s/p closure, DM-2 admitted on 10/19 for sepsis secondary to acute cholecystitis. Pt underwent open cholecystectomy and drainage of perihepatic abscess. Pt also had RLL infiltrate on CXR. He is currently on broad spectrum abx with atypical coverage with azithromycin. Pt has been tachycardic since admission, initial EKG with sinus tachy due to underlying sepsis. Also had UTI and Acute renal failure with Cr 6.18.    10/20:  Pt is resting in bed. Appears lethargic but answering all questions. He reports his abdominal pain 9/10. No nausea/vomiting. T 100.6  Pt continues to be tachycardic and hypertensive      Review of Systems negative with exception of pertinent positives noted above      PHYSICAL EXAM       Visit Vitals  BP (!) 161/100 (BP Patient Position: At rest)   Pulse (!) 125   Temp 99.5 °F (37.5 °C)   Resp 18   Ht 5' 6\" (1.676 m)   Wt 72.6 kg (160 lb)   SpO2 96%   BMI 25.82 kg/m²      Temp (24hrs), Av °F (37.2 °C), Min:98 °F (36.7 °C), Max:100.6 °F (38.1 °C)    Oxygen Therapy  O2 Sat (%): 96 % (10/20/20 0740)  Pulse via Oximetry: 102 beats per minute (10/19/20 1817)  O2 Device: Nasal airway (10/19/20 1817)  O2 Flow Rate (L/min): 2 l/min (10/19/20 1817)    Intake/Output Summary (Last 24 hours) at 10/20/2020 0749  Last data filed at 10/20/2020 0520  Gross per 24 hour   Intake 1500 ml   Output 845 ml   Net 655 ml          General: Middle aged, NAD, lethargic  Head:  Atraumatic Normocephalic. Eyes:  PERRLA, EOMI, Anicteric. ENT:  No discharges/lesions. Lungs:  CTA Bilaterally. CVS:  Regular rate and rhythm,  No murmur, rub, or gallop, No JVD, No lower   extremity edema.   Abdomen: Slightly distended, bowel sounds sluggish, tender in RLQ.  MSK:  No deformities, lesions, Spontaneously moves extremities. Neurologic:  gcs 15, no motor or sensory deficits  Psychiatry:      Flat affect, AO x3  Skin:   No rash/lesions. Good skin turgor  Heme/Lymph/Immune:  No petechiae, ecchymoses, overt signs of bleeding or    lymphadenopathy noted. Recent Results (from the past 24 hour(s))   GLUCOSE, POC    Collection Time: 10/19/20 12:10 PM   Result Value Ref Range    Glucose (POC) 109 (H) 65 - 100 mg/dL   GLUCOSE, POC    Collection Time: 10/19/20  3:04 PM   Result Value Ref Range    Glucose (POC) 91 65 - 100 mg/dL   VANCOMYCIN, RANDOM    Collection Time: 10/19/20  8:52 PM   Result Value Ref Range    Vancomycin, random 14.2 UG/ML   GLUCOSE, POC    Collection Time: 10/19/20 11:31 PM   Result Value Ref Range    Glucose (POC) 125 (H) 65 - 207 mg/dL   METABOLIC PANEL, COMPREHENSIVE    Collection Time: 10/20/20  4:30 AM   Result Value Ref Range    Sodium 146 (H) 136 - 145 mmol/L    Potassium 3.9 3.5 - 5.1 mmol/L    Chloride 116 (H) 98 - 107 mmol/L    CO2 22 21 - 32 mmol/L    Anion gap 8 7 - 16 mmol/L    Glucose 90 65 - 100 mg/dL    BUN 39 (H) 6 - 23 MG/DL    Creatinine 2.39 (H) 0.8 - 1.5 MG/DL    GFR est AA 38 (L) >60 ml/min/1.73m2    GFR est non-AA 31 (L) >60 ml/min/1.73m2    Calcium 8.7 8.3 - 10.4 MG/DL    Bilirubin, total 0.5 0.2 - 1.1 MG/DL    ALT (SGPT) 23 12 - 65 U/L    AST (SGOT) 97 (H) 15 - 37 U/L    Alk.  phosphatase 159 (H) 50 - 136 U/L    Protein, total 6.2 (L) 6.3 - 8.2 g/dL    Albumin 1.7 (L) 3.5 - 5.0 g/dL    Globulin 4.5 (H) 2.3 - 3.5 g/dL    A-G Ratio 0.4 (L) 1.2 - 3.5     MAGNESIUM    Collection Time: 10/20/20  4:30 AM   Result Value Ref Range    Magnesium 2.3 1.8 - 2.4 mg/dL   CBC WITH AUTOMATED DIFF    Collection Time: 10/20/20  4:30 AM   Result Value Ref Range    WBC 8.8 4.3 - 11.1 K/uL    RBC 3.21 (L) 4.23 - 5.6 M/uL    HGB 8.3 (L) 13.6 - 17.2 g/dL    HCT 25.9 (L) 41.1 - 50.3 %    MCV 80.7 79.6 - 97.8 FL    MCH 25.9 (L) 26.1 - 32.9 PG    MCHC 32.0 31.4 - 35.0 g/dL    RDW 15.6 (H) 11.9 - 14.6 %    PLATELET 873 982 - 625 K/uL    MPV 11.4 9.4 - 12.3 FL    ABSOLUTE NRBC 0.00 0.0 - 0.2 K/uL    DF AUTOMATED      NEUTROPHILS 83 (H) 43 - 78 %    LYMPHOCYTES 9 (L) 13 - 44 %    MONOCYTES 8 4.0 - 12.0 %    EOSINOPHILS 0 (L) 0.5 - 7.8 %    BASOPHILS 0 0.0 - 2.0 %    IMMATURE GRANULOCYTES 0 0.0 - 5.0 %    ABS. NEUTROPHILS 7.3 1.7 - 8.2 K/UL    ABS. LYMPHOCYTES 0.8 0.5 - 4.6 K/UL    ABS. MONOCYTES 0.7 0.1 - 1.3 K/UL    ABS. EOSINOPHILS 0.0 0.0 - 0.8 K/UL    ABS. BASOPHILS 0.0 0.0 - 0.2 K/UL    ABS. IMM. GRANS. 0.0 0.0 - 0.5 K/UL   GLUCOSE, POC    Collection Time: 10/20/20  5:25 AM   Result Value Ref Range    Glucose (POC) 102 (H) 65 - 100 mg/dL         Imaging /Procedures /Studies   All diagnostic imaging personally reviewed by me. CTAP:  IMPRESSION:      Findings worrisome for acute calculus cholecystitis.     Possible subhepatic abscess.     Small amount of ascites in the right anterior subphrenic space.     Right lower lobe pneumonia.     Status post ASD closure.       Evaluation of the abdominal viscera is limited without intravenous contrast.    EXAM:  US RETROPERITONEUM COMP     INDICATION:  LOKI     COMPARISON: None.     TECHNIQUE:  Real-time sonography of the kidneys, retroperitoneum and bladder was performed  with multiple static images obtained.     FINDINGS:  The kidneys have increased echogenicity with no mass, stone or hydronephrosis. The right kidney measures 12.1 cm and the left kidney measures 11.1 cm in  length.     The aorta tapers normally. The proximal iliac arteries are normal.  The IVC is  normal. No retroperitoneal mass is identified.     The urinary bladder is normal. Abdominal ascites noted.     IMPRESSION  IMPRESSION:   1. Increased renal echogenicity. Findings compatible with chronic medical renal  disease.   2. Abdominal ascites       ASSESSMENT      Hospital Problems as of 10/20/2020 Date Reviewed: 3/3/2017          Codes Class Noted - Resolved POA    Hypertensive urgency ICD-10-CM: I16.0  ICD-9-CM: 401.9  10/20/2020 - Present Unknown        History of CVA (cerebrovascular accident) ICD-10-CM: Z86.73  ICD-9-CM: V12.54  10/19/2020 - Present Unknown        * (Principal) Abscess of abdominal cavity ([facility] ICD-10-CM: K65.1  ICD-9-CM: 567.22  10/19/2020 - Present Unknown        RLL pneumonia ICD-10-CM: J18.9  ICD-9-CM: 486  10/19/2020 - Present Unknown        Left hemiparesis ([facility] ICD-10-CM: G81.94  ICD-9-CM: 342.90  10/19/2020 - Present Unknown        Sepsis ([facility] ICD-10-CM: A41.9  ICD-9-CM: 038.9, 995.91  10/19/2020 - Present Unknown        Sinus tachycardia ICD-10-CM: R00.0  ICD-9-CM: 427.89  10/19/2020 - Present Unknown        LOKI (acute kidney injury) ([facility] ICD-10-CM: N17.9  ICD-9-CM: 584.9  10/19/2020 - Present Unknown        Hypertension (Chronic) ICD-10-CM: I10  ICD-9-CM: 401.9  Unknown - Present Yes        Type 2 diabetes mellitus (HCC) (Chronic) ICD-10-CM: E11.9  ICD-9-CM: 250.00  Unknown - Present                   Plan:  - pt is s/p open lap eve done on 10/19/20  Pt is currently NPO  Pain control is suboptimal, continue prn narcotics IV an oral  Post op care and resumption of oral diet deferred to General Surgery  Continue IV vancomycin and cefepime for now. Continue azithromycin for atypical coverage in view of RLL infiltrates. Adding hydralazine 25 mg po TID and labetalol 100 mg po bid for hypertension. Sinus tachycardia from underlying sepsis and pain. Continue other meds as reconciled in [facility]     IV fluids, LR @ 125 cc/hr    PT/OT once medically cleared    GI soft diet    DVT Prophylaxis: SCD's  High risk with opioids on board  Dispo: pending until medically cleared    Louise Cronin MD

## 2020-10-20 NOTE — PROGRESS NOTES
Problem: Dysphagia (Adult)  Goal: *Acute Goals and Plan of Care (Insert Text)  Outcome: Progressing Towards Goal  Note: LTG: Patient will tolerate least restrictive diet without overt signs or symptoms of airway compromise. STG: Patient will participate in ongoing chewable trials to determine ability to tolerate advancement. STG: Patient will participate in modified barium swallow study as clinically indicated. SPEECH LANGUAGE PATHOLOGY: DYSPHAGIA- Initial Assessment    NAME/AGE/GENDER: Zaira Ivory is a 50 y.o. male  DATE: 10/20/2020  PRIMARY DIAGNOSIS: Sepsis ([facility] [A41.9]  Procedure(s) (LRB):  ATEMPTED LAPAROSCOPIC CHOLECYSTECTOMY CONVERTED TO OPEN (N/A) 1 Day Post-Op  ICD-10: Treatment Diagnosis: R13.11 Dysphagia, Oral Phase    RECOMMENDATIONS   DIET:    GI soft (mechancial soft)    MEDICATIONS: With liquid  1 at a time     ASPIRATION PRECAUTIONS  · Slow rate of intake  · Small bites/sips  · Upright at 90 degrees during meal     COMPENSATORY STRATEGIES/MODIFICATIONS  · Small sips and bites     EDUCATION:  · Recommendations discussed with patient     CONTINUATION OF SKILLED SERVICES/MEDICAL NECESSITY:   Patient is expected to demonstrate progress in  swallow strength, swallow timeliness, swallow function, diet tolerance, and swallow safety in order to  work toward diet advancement.  Patient continues to require skilled intervention due to possible dysphagia. RECOMMENDATIONS for CONTINUED SPEECH THERAPY:   YES: Anticipate need for ongoing speech therapy during this hospitalization. ASSESSMENT   Patient presents with history of mild oropharyngeal dysphagia. Functional oral phase and no overt s/sx pharyngeal dysphagia this date with breakfast tray, however patient declined chewables. Patient on mechanical soft diet at baseline. Recommend continuing GI soft (per MD) and resuming patient's baseline diet of mechanical soft.  ST will follow up for diet tolerance and regular solid trials when patient agreeable to chewables. REHABILITATION POTENTIAL FOR STATED GOALS: Good    PLAN    FREQUENCY/DURATION: Continue to follow patient 2 times a week for duration of hospital stay to address above goals. - Recommendations for next treatment session: Next treatment will address diet tolerance    SUBJECTIVE   \"speech therapy didn't work with me for long\" reports no longer receives therapy. History of Present Injury/Illness: Mr. Richard Garcia  has a past medical history of Acute ischemic stroke ([address] (12/12/2019), Acute pancreatitis (11/19/2014), Cerebrovascular accident (CVA) due to embolism ([address] (12/12/2019), Diabetes ([address] (2002), Diabetes ([address] Diabetes mellitus, and Hypertension. He also has no past medical history of Arthritis, Asthma, Autoimmune disease ([address] CAD (coronary artery disease), Cancer ([address] Chronic kidney disease, COPD, Dementia, Dementia ([address] Heart failure ([address] Ill-defined condition, Infectious disease, Liver disease, Other ill-defined conditions(799.89), Psychiatric disorder, PUD (peptic ulcer disease), Seizures ([address] or Sleep disorder. Leslye Ortiz He also  has a past surgical history that includes hx hernia repair and hx orthopaedic. Problem List:  (Impairments causing functional limitations):  1. Hx mild oropharyngeal dysphagia  2. ? Dysphagia     Previous Dysphagia: YES patient seen during last admission 12/2019 until April. History of mild oropharyngeal dysphagia  Modified barium swallow study 1/8/2020 presents with a delayed swallow down to the pyriform sinuses with thin liquids, mixed and solid. Pt with penetration. Pt aspirated during the swallow with thin liquids via a straw. Pt tolerated thin liquids via a cup. No residue. Recommend mechanical soft textures with thin liquids via a cup. Medication whole in pureed. Patient will benefit from skilled intervention to address the below impairments. Diet Prior to Evaluation: GI soft.      Orientation: Person  Place  Time  Situation    Pain: Pain Scale 1: Numeric (0 - 10)  Pain Intensity 1: 0  Pain Location 1: Abdomen  Pain Intervention(s) 1: Medication (see MAR)    Cognitive-Linguistic Screen:  Prior level of function: resides in nursing facility.  Speech Production:   o Dysarthria at baseline. Moderate.  Expressive Language:  o WFL   Receptive Language:  o WFL   Cognition:   o Impaired at baseline. Recommendations: Given results of speech, language, cognitive screening,  patient appears to be functioning at baseline. OBJECTIVE   Oral Motor:   · Labial: Left droop at baseline   · Dentition: Natural  · Oral Hygiene: Adequate  · Lingual: Decreased rate and Decreased strength    Swallow evaluation:   Patient self fed trials of thin liquids via straw and puree trials. Declined chewables politely. Functional prep/clearance. No overt s/sx airway compromise. INTERDISCIPLINARY COLLABORATION: n/a  PRECAUTIONS/ALLERGIES: Patient has no known allergies. Tool Used: Dysphagia Outcome and Severity Scale (ROCHELLE)    Score Comments   Normal Diet  [] 7 With no strategies or extra time needed   Functional Swallow  [] 6 May have mild oral or pharyngeal delay   Mild Dysphagia  [] 5 Which may require one diet consistency restricted    Mild-Moderate Dysphagia  [] 4 With 1-2 diet consistencies restricted   Moderate Dysphagia  [] 3 With 2 or more diet consistencies restricted   Moderate-Severe Dysphagia  [] 2 With partial PO strategies (trials with ST only)   Severe Dysphagia  [] 1 With inability to tolerate any PO safely      Score:  Initial: 5 Most Recent: 5 (Date 10/20/20 )   Interpretation of Tool: The Dysphagia Outcome and Severity Scale (ROCHELLE) is a simple, easy-to-use, 7-point scale developed to systematically rate the functional severity of dysphagia based on objective assessment and make recommendations for diet level, independence level, and type of nutrition.      Current Medications:   No current facility-administered medications on file prior to encounter. Current Outpatient Medications on File Prior to Encounter   Medication Sig Dispense Refill    aspirin 81 mg chewable tablet Take 1 Tab by mouth daily. 30 Tab 5    clopidogreL (PLAVIX) 75 mg tab Take 1 Tab by mouth daily. 30 Tab 5    acetaminophen (TYLENOL) 325 mg tablet Take  by mouth every four (4) hours as needed for Pain.  icosapent ethyL (VASCEPA) 1 gram capsule Take 1 Cap by mouth two (2) times daily (with meals). 90 Cap 3    atorvastatin (LIPITOR) 80 mg tablet Take 1 Tab by mouth nightly. 30 Tab 0    lisinopril (PRINIVIL, ZESTRIL) 40 mg tablet Take 1 Tab by mouth daily.  30 Tab 3       After treatment position/precautions:  · Upright in bed  · Call light within reach    Total Treatment Duration:   Time In: 0906  Time Out: [address], [facility] Samanta SHIRLEY

## 2020-10-20 NOTE — PROGRESS NOTES
Pt has remained in bed. Refused to get OOB for nursing. OT was able to assess the pt. Refused PT. Instructed on IS in the morning and attempted to work with pt in the afternoon. Pt pushed it away stating \"I will work with it later\". Appetite fair. Medicated for pain x 1 with dilaudid. BP has been an issue all day. Medications started.

## 2020-10-21 LAB
ANION GAP SERPL CALC-SCNC: 5 MMOL/L (ref 7–16)
BACTERIA SPEC CULT: NORMAL
BUN SERPL-MCNC: 25 MG/DL (ref 6–23)
CALCIUM SERPL-MCNC: 8.5 MG/DL (ref 8.3–10.4)
CHLORIDE SERPL-SCNC: 117 MMOL/L (ref 98–107)
CO2 SERPL-SCNC: 24 MMOL/L (ref 21–32)
CREAT SERPL-MCNC: 1.35 MG/DL (ref 0.8–1.5)
ERYTHROCYTE [DISTWIDTH] IN BLOOD BY AUTOMATED COUNT: 15.9 % (ref 11.9–14.6)
GLUCOSE BLD STRIP.AUTO-MCNC: 118 MG/DL (ref 65–100)
GLUCOSE BLD STRIP.AUTO-MCNC: 168 MG/DL (ref 65–100)
GLUCOSE BLD STRIP.AUTO-MCNC: 173 MG/DL (ref 65–100)
GLUCOSE SERPL-MCNC: 104 MG/DL (ref 65–100)
HCT VFR BLD AUTO: 23.3 % (ref 41.1–50.3)
HGB BLD-MCNC: 7.5 G/DL (ref 13.6–17.2)
MCH RBC QN AUTO: 26 PG (ref 26.1–32.9)
MCHC RBC AUTO-ENTMCNC: 32.2 G/DL (ref 31.4–35)
MCV RBC AUTO: 80.6 FL (ref 79.6–97.8)
NRBC # BLD: 0.02 K/UL (ref 0–0.2)
PLATELET # BLD AUTO: 182 K/UL (ref 150–450)
PMV BLD AUTO: 12.1 FL (ref 9.4–12.3)
POTASSIUM SERPL-SCNC: 3.8 MMOL/L (ref 3.5–5.1)
RBC # BLD AUTO: 2.89 M/UL (ref 4.23–5.6)
SERVICE CMNT-IMP: NORMAL
SODIUM SERPL-SCNC: 146 MMOL/L (ref 136–145)
WBC # BLD AUTO: 8.5 K/UL (ref 4.3–11.1)

## 2020-10-21 PROCEDURE — 74011250636 HC RX REV CODE- 250/636: Performed by: INTERNAL MEDICINE

## 2020-10-21 PROCEDURE — 74011250637 HC RX REV CODE- 250/637: Performed by: HOSPITALIST

## 2020-10-21 PROCEDURE — 74011000258 HC RX REV CODE- 258: Performed by: SURGERY

## 2020-10-21 PROCEDURE — 51798 US URINE CAPACITY MEASURE: CPT

## 2020-10-21 PROCEDURE — 74011250637 HC RX REV CODE- 250/637: Performed by: NURSE PRACTITIONER

## 2020-10-21 PROCEDURE — 65270000029 HC RM PRIVATE

## 2020-10-21 PROCEDURE — 97535 SELF CARE MNGMENT TRAINING: CPT

## 2020-10-21 PROCEDURE — 77030019905 HC CATH URETH INTMIT MDII -A

## 2020-10-21 PROCEDURE — 87635 SARS-COV-2 COVID-19 AMP PRB: CPT

## 2020-10-21 PROCEDURE — 74011250636 HC RX REV CODE- 250/636: Performed by: SURGERY

## 2020-10-21 PROCEDURE — 2709999900 HC NON-CHARGEABLE SUPPLY

## 2020-10-21 PROCEDURE — 82962 GLUCOSE BLOOD TEST: CPT

## 2020-10-21 PROCEDURE — 74011250637 HC RX REV CODE- 250/637: Performed by: SURGERY

## 2020-10-21 PROCEDURE — 85027 COMPLETE CBC AUTOMATED: CPT

## 2020-10-21 PROCEDURE — 80048 BASIC METABOLIC PNL TOTAL CA: CPT

## 2020-10-21 PROCEDURE — 97530 THERAPEUTIC ACTIVITIES: CPT

## 2020-10-21 PROCEDURE — 74011636637 HC RX REV CODE- 636/637: Performed by: SURGERY

## 2020-10-21 PROCEDURE — 74011250636 HC RX REV CODE- 250/636: Performed by: NURSE PRACTITIONER

## 2020-10-21 PROCEDURE — 36415 COLL VENOUS BLD VENIPUNCTURE: CPT

## 2020-10-21 PROCEDURE — BT40ZZZ ULTRASONOGRAPHY OF BLADDER: ICD-10-PCS | Performed by: HOSPITALIST

## 2020-10-21 RX ORDER — POLYETHYLENE GLYCOL 3350 17 G/17G
17 POWDER, FOR SOLUTION ORAL DAILY
Status: DISCONTINUED | OUTPATIENT
Start: 2020-10-21 | End: 2020-10-24 | Stop reason: HOSPADM

## 2020-10-21 RX ORDER — FACIAL-BODY WIPES
10 EACH TOPICAL DAILY PRN
Status: DISCONTINUED | OUTPATIENT
Start: 2020-10-21 | End: 2020-10-24 | Stop reason: HOSPADM

## 2020-10-21 RX ORDER — HYDRALAZINE HYDROCHLORIDE 50 MG/1
50 TABLET, FILM COATED ORAL 3 TIMES DAILY
Status: DISCONTINUED | OUTPATIENT
Start: 2020-10-21 | End: 2020-10-24 | Stop reason: HOSPADM

## 2020-10-21 RX ADMIN — LABETALOL HYDROCHLORIDE 100 MG: 100 TABLET, FILM COATED ORAL at 08:29

## 2020-10-21 RX ADMIN — HEPARIN SODIUM 5000 UNITS: 5000 INJECTION INTRAVENOUS; SUBCUTANEOUS at 08:26

## 2020-10-21 RX ADMIN — VANCOMYCIN HYDROCHLORIDE 1250 MG: 10 INJECTION, POWDER, LYOPHILIZED, FOR SOLUTION INTRAVENOUS at 02:07

## 2020-10-21 RX ADMIN — HYDRALAZINE HYDROCHLORIDE 50 MG: 50 TABLET, FILM COATED ORAL at 17:03

## 2020-10-21 RX ADMIN — HYDRALAZINE HYDROCHLORIDE 50 MG: 50 TABLET, FILM COATED ORAL at 22:28

## 2020-10-21 RX ADMIN — Medication 5 ML: at 14:08

## 2020-10-21 RX ADMIN — LABETALOL HYDROCHLORIDE 100 MG: 100 TABLET, FILM COATED ORAL at 17:08

## 2020-10-21 RX ADMIN — HYDROCODONE BITARTRATE AND ACETAMINOPHEN 1 TABLET: 7.5; 325 TABLET ORAL at 14:38

## 2020-10-21 RX ADMIN — SODIUM CHLORIDE, SODIUM LACTATE, POTASSIUM CHLORIDE, AND CALCIUM CHLORIDE 50 ML/HR: 600; 310; 30; 20 INJECTION, SOLUTION INTRAVENOUS at 02:14

## 2020-10-21 RX ADMIN — POLYETHYLENE GLYCOL 3350 17 G: 17 POWDER, FOR SOLUTION ORAL at 11:19

## 2020-10-21 RX ADMIN — CEFEPIME HYDROCHLORIDE 1 G: 1 INJECTION, POWDER, FOR SOLUTION INTRAMUSCULAR; INTRAVENOUS at 08:29

## 2020-10-21 RX ADMIN — HEPARIN SODIUM 5000 UNITS: 5000 INJECTION INTRAVENOUS; SUBCUTANEOUS at 22:28

## 2020-10-21 RX ADMIN — HYDRALAZINE HYDROCHLORIDE 25 MG: 50 TABLET, FILM COATED ORAL at 08:28

## 2020-10-21 RX ADMIN — Medication 10 ML: at 22:28

## 2020-10-21 RX ADMIN — INSULIN LISPRO 1 UNITS: 100 INJECTION, SOLUTION INTRAVENOUS; SUBCUTANEOUS at 11:29

## 2020-10-21 RX ADMIN — INSULIN LISPRO 1 UNITS: 100 INJECTION, SOLUTION INTRAVENOUS; SUBCUTANEOUS at 18:34

## 2020-10-21 RX ADMIN — ATORVASTATIN CALCIUM 80 MG: 80 TABLET, FILM COATED ORAL at 22:28

## 2020-10-21 RX ADMIN — Medication 10 ML: at 06:00

## 2020-10-21 NOTE — PROGRESS NOTES
Assessment and medication completed. Patient 02 at 2 liters via NC without any breathing difficulty. Patient has telemetry box (3138) and is in working order and on patient. Patient having pain to abdominal area post-op with pain level at 9 and this nurse repositioned patient at this time. Emptied out 100 cc from TK drain at this time and it is bright red in color with a clot in the blood. Will continue to assess patient this shift.

## 2020-10-21 NOTE — PROGRESS NOTES
H&P/Consult Note/Progress Note/Office Note:   Nik Robles MRN: [id number]  :1975  Age:45 y.o.    HPI: Nik Robles is a 50 y.o. male who has PMHx of stroke, HTN, DM, who we are asked by hospitalist to see for intraabdominal abscess. He was transferred from his nursing home  on 10/18 due to N/V and abdominal pain. In the ED, pt was found to have WBC 12 and fever. He was started on abx. Tbili 0.6, Alk Phos and AST elevated. CT abdomen revealed possible cholecystitis which was confirmed on US. General surgery was consulted for evaluation of cholecystitis with subhepatic abscess. 10/20/20 Patient in bed this AM. Open cholecystectomy yesterday. Dressing C/D/I, richard drain maintained. Tachy and hypertensive, will add better pain control. 10/21/2020- Patient in bed. Fever last evening 102. 2. tolerating GI soft diet. Incision staples without redness or drainage. Richard drain with serosanguineous drainage noted          US 10/19/2020:  IMPRESSION: Findings consistent with acute cholecystitis.     Right subhepatic fluid collection as seen on prior CT.  Possibly represent an  abscess.     Hepatomegaly with diffuse fatty change.       CTAP 10/18/2020:  IMPRESSION:      Findings worrisome for acute calculus cholecystitis.     Possible subhepatic abscess.     Small amount of ascites in the right anterior subphrenic space.     Right lower lobe pneumonia.     Status post ASD closure.       Evaluation of the abdominal viscera is limited without intravenous contrast.    Past Medical History:   Diagnosis Date    Acute ischemic stroke ([address] 2019    Acute pancreatitis 2014    Cerebrovascular accident (CVA) due to embolism ([address] 2019    Diabetes ([address] 2002    Diabetes ([address]     Diabetes mellitus     Hypertension      Past Surgical History:   Procedure Laterality Date    HX HERNIA REPAIR      HX ORTHOPAEDIC      carpal tunnel surgery     Current Facility-Administered Medications   Medication Dose Route Frequency    polyethylene glycol (MIRALAX) packet 17 g  17 g Oral DAILY    bisacodyL (DULCOLAX) suppository 10 mg  10 mg Rectal DAILY PRN    hydrALAZINE (APRESOLINE) tablet 50 mg  50 mg Oral TID    metoprolol (LOPRESSOR) injection 2.5 mg  2.5 mg IntraVENous Q6H PRN    HYDROmorphone (PF) (DILAUDID) injection 0.5 mg  0.5 mg IntraVENous Q4H PRN    HYDROcodone-acetaminophen (NORCO) 7.5-325 mg per tablet 1 Tab  1 Tab Oral Q4H PRN    labetaloL (NORMODYNE) tablet 100 mg  100 mg Oral BID    heparin (porcine) injection 5,000 Units  5,000 Units SubCUTAneous Q12H    atorvastatin (LIPITOR) tablet 80 mg  80 mg Oral QHS    sodium chloride (NS) flush 5-40 mL  5-40 mL IntraVENous Q8H    sodium chloride (NS) flush 5-40 mL  5-40 mL IntraVENous PRN    acetaminophen (TYLENOL) tablet 650 mg  650 mg Oral Q6H PRN    Or    acetaminophen (TYLENOL) suppository 650 mg  650 mg Rectal Q6H PRN    senna (SENOKOT) tablet 8.6 mg  1 Tab Oral DAILY PRN    insulin lispro (HUMALOG) injection   SubCUTAneous Q6H    Vancomycin Intermittent Dosing   Other Rx Dosing/Monitoring    cefepime (MAXIPIME) 1 g in 0.9% sodium chloride (MBP/ADV) 50 mL  1 g IntraVENous Q24H    influenza vaccine 2020-21 (6 mos+)(PF) (FLUARIX/FLULAVAL/FLUZONE QUAD) injection 0.5 mL  0.5 mL IntraMUSCular PRIOR TO DISCHARGE     Patient has no known allergies.   Social History     Socioeconomic History    Marital status:      Spouse name: Not on file    Number of children: Not on file    Years of education: Not on file    Highest education level: Not on file   Tobacco Use    Smoking status: Former Smoker     Packs/day: 0.25     Types: Cigarettes     Last attempt to quit: 2019     Years since quittin.8    Smokeless tobacco: Former User     Types: Chew   Substance and Sexual Activity    Alcohol use: No    Drug use: No    Sexual activity: Yes     Partners: Female     Birth control/protection: None     Social History     Tobacco Use   Smoking Status Former Smoker    Packs/day: 0.25    Types: Cigarettes    Last attempt to quit: 2019    Years since quittin.8   Smokeless Tobacco Former User    Types: [de-identified]     Family History   Problem Relation Age of Onset    Diabetes Mother     Diabetes Father      ROS: The patient has no difficulty with chest pain or shortness of breath. No fever or chills. Comprehensive review of systems was otherwise unremarkable except as noted above. Physical Exam:   Visit Vitals  BP (!) 141/92 (BP 1 Location: Right arm, BP Patient Position: At rest)   Pulse 80   Temp 98.8 °F (37.1 °C)   Resp 18   Ht 5' 6\" (1.676 m)   Wt 160 lb (72.6 kg)   SpO2 98%   BMI 25.82 kg/m²     Constitutional: Alert, oriented, cooperative patient in no acute distress; appears stated age    Eyes:Sclera are clear. EOMs intact  ENMT: no external lesions gross hearing normal; no obvious neck masses, no ear or lip lesions, nares normal  CV: RRR. Normal perfusion  Resp: No JVD. Breathing is  non-labored; no audible wheezing. GI: soft and non-distended; appropriately tender.  Dressing C/D/I- removed- ashley drain maintained   Musculoskeletal: left hemiparesis  Neuro:  Oriented;left hemiparesis  Psychiatric: normal affect and mood, no memory impairment    Recent vitals (if inpt):  Patient Vitals for the past 24 hrs:   BP Temp Pulse Resp SpO2   10/21/20 1140 (!) 141/92 98.8 °F (37.1 °C) 80 18 98 %   10/21/20 0802 (!) 165/96 98.4 °F (36.9 °C) 91 18 97 %   10/21/20 0354 115/71 99.7 °F (37.6 °C) 89 18 96 %   10/21/20 0025 111/70 99.3 °F (37.4 °C) 91 18 97 %   10/20/20 2010 (!) 179/99 (!) 102.2 °F (39 °C) (!) 103 18 96 %   10/20/20 1551 (!) 176/100 (!) 101.1 °F (38.4 °C) (!) 101 16 98 %       Labs:  Recent Labs     10/21/20  0529 10/20/20  0430  10/18/20  2019   WBC 8.5 8.8   < > 12.5*   HGB 7.5* 8.3*   < > 8.7*    163   < > 198   * 146*   < > 142   K 3.8 3.9   < > 4.2   * 116*   < > 109*   CO2 24 22   < > 23   BUN 25* 39*   < > 73*   CREA 1.35 2.39*   < > 6.18*   * 90   < > 82   PTP  --   --   --  14.4   INR  --   --   --  1.1   APTT  --   --   --  35.1   TBILI  --  0.5   < > 0.5   ALT  --  23   < > 19   AP  --  159*   < > 127   LPSE  --   --   --  277    < > = values in this interval not displayed. Lab Results   Component Value Date/Time    WBC 8.5 10/21/2020 05:29 AM    HGB 7.5 (L) 10/21/2020 05:29 AM    PLATELET 492 99/49/2368 05:29 AM    Sodium 146 (H) 10/21/2020 05:29 AM    Potassium 3.8 10/21/2020 05:29 AM    Chloride 117 (H) 10/21/2020 05:29 AM    CO2 24 10/21/2020 05:29 AM    BUN 25 (H) 10/21/2020 05:29 AM    Creatinine 1.35 10/21/2020 05:29 AM    Glucose 104 (H) 10/21/2020 05:29 AM    INR 1.1 10/18/2020 08:19 PM    aPTT 35.1 10/18/2020 08:19 PM    Bilirubin, total 0.5 10/20/2020 04:30 AM    ALT (SGPT) 23 10/20/2020 04:30 AM    Alk. phosphatase 159 (H) 10/20/2020 04:30 AM    Lipase 277 10/18/2020 08:19 PM    Troponin-I, Qt. <0.04 11/19/2014 04:10 PM       I reviewed recent labs and recent radiologic studies. I independently reviewed radiology images for studies I described above or studies I have ordered.    Admission date (for inpatients): 10/18/2020   * No surgery date entered *  Procedure(s):  ATEMPTED LAPAROSCOPIC CHOLECYSTECTOMY CONVERTED TO OPEN    ASSESSMENT/PLAN:  Problem List  Date Reviewed: 3/3/2017          Codes Class Noted    Hypertensive urgency ICD-10-CM: I16.0  ICD-9-CM: 401.9  10/20/2020        History of CVA (cerebrovascular accident) ICD-10-CM: Z86.73  ICD-9-CM: V12.54  10/19/2020        * (Principal) Abscess of abdominal cavity ([facility] ICD-10-CM: K65.1  ICD-9-CM: 567.22  10/19/2020        RLL pneumonia ICD-10-CM: J18.9  ICD-9-CM: 486  10/19/2020        Left hemiparesis ([facility] ICD-10-CM: G81.94  ICD-9-CM: 342.90  10/19/2020        Sepsis ([facility] ICD-10-CM: A41.9  ICD-9-CM: 038.9, 995.91  10/19/2020        Sinus tachycardia ICD-10-CM: R00.0  ICD-9-CM: 427.89  10/19/2020 LOKI (acute kidney injury) ([facility] ICD-10-CM: N17.9  ICD-9-CM: 584.9  10/19/2020        Hypomagnesemia ICD-10-CM: E83.42  ICD-9-CM: 275.2  3/7/2020        PFO (patent foramen ovale) ICD-10-CM: Q21.1  ICD-9-CM: 745.5  12/17/2019        Arrhythmia ICD-10-CM: I49.9  ICD-9-CM: 427.9  12/15/2019        Hyperlipemia ICD-10-CM: E78.5  ICD-9-CM: 272.4  12/15/2019        Acute embolic stroke [facility]-TH: I63.9  ICD-9-CM: 434.11  12/12/2019        Microcytic anemia ICD-10-CM: D50.9  ICD-9-CM: 280.9  11/20/2014        Acute pancreatitis ICD-10-CM: K85.90  ICD-9-CM: 632.2  11/19/2014        GERD (gastroesophageal reflux disease) (Chronic) ICD-10-CM: K21.9  ICD-9-CM: 530.81  11/19/2014        Hypertension (Chronic) ICD-10-CM: I10  ICD-9-CM: 401.9  Unknown        Type 2 diabetes mellitus (HCC) (Chronic) ICD-10-CM: E11.9  ICD-9-CM: 250.00  Unknown            Principal Problem:    Abscess of abdominal cavity ([facility] (10/19/2020)    Active Problems:    Hypertension ()      History of CVA (cerebrovascular accident) (10/19/2020)      RLL pneumonia (10/19/2020)      Left hemiparesis ([facility] (10/19/2020)      Sepsis ([facility] (10/19/2020)      Sinus tachycardia (10/19/2020)      LOKI (acute kidney injury) ([facility] (10/19/2020)      Hypertensive urgency (10/20/2020)       Plan:  Continue care per primary  Heparin SQ will plan to restart Plavix in couple days  PO pain medication  OOB   PT/OT    Nayeli Cowan NP

## 2020-10-21 NOTE — PROGRESS NOTES
Patient resting in bed and blood sugar 118 and no coverage needed. 02 at 1 liter via NC without any breathing difficulty. No pain or distress noted. Will prepare report for oncoming nurse.

## 2020-10-21 NOTE — PROGRESS NOTES
Physical Therapy Note:    Physical therapy evaluation orders received and chart reviewed. Attempted to see patient this PM to initiate assessment. Patient known to therapist from previous admission; politely declined mobility assessment secondary to pain; c/o 7/10 abdominal pain; RN notified. Encouragement provided, education on positioning and log roll. Patient continued to politely decline. Will follow and re-attempt at a later time/date.  Thank you,    Alvino Deluna, PT, DPT  10/21/2020 14:25PM

## 2020-10-21 NOTE — PROGRESS NOTES
SPEECH PATHOLOGY NOTE:    Attempted to see patient, however working with another therapy at this time. Will check back at later time/date as schedule permits.        [address] CCC-SLP

## 2020-10-21 NOTE — PROGRESS NOTES
Ross Smith. Admission Date: 10/18/2020             Renal Daily Progress Note: 10/21/2020    The patient's chart is reviewed and the patient is discussed with the staff. Follow up LOKI  Subjective:   Patient seen and examined on rounds, report abd tender improving, better appetite, no new complaints.    Labs and chart reviewed- renal function improving, s/p drainage of perihepatic abscess 10/19    ROS:  General: fever improved, no chills, appetite ok  CV: no CP  Lung: no SOB, no cough  GI: + abd tenderness- better, no N/V/D  Ext: no edema, left sided residual weakness     Current Facility-Administered Medications   Medication Dose Route Frequency    polyethylene glycol (MIRALAX) packet 17 g  17 g Oral DAILY    bisacodyL (DULCOLAX) suppository 10 mg  10 mg Rectal DAILY PRN    metoprolol (LOPRESSOR) injection 2.5 mg  2.5 mg IntraVENous Q6H PRN    hydrALAZINE (APRESOLINE) tablet 25 mg  25 mg Oral TID    HYDROmorphone (PF) (DILAUDID) injection 0.5 mg  0.5 mg IntraVENous Q4H PRN    HYDROcodone-acetaminophen (NORCO) 7.5-325 mg per tablet 1 Tab  1 Tab Oral Q4H PRN    labetaloL (NORMODYNE) tablet 100 mg  100 mg Oral BID    heparin (porcine) injection 5,000 Units  5,000 Units SubCUTAneous Q12H    atorvastatin (LIPITOR) tablet 80 mg  80 mg Oral QHS    sodium chloride (NS) flush 5-40 mL  5-40 mL IntraVENous Q8H    sodium chloride (NS) flush 5-40 mL  5-40 mL IntraVENous PRN    acetaminophen (TYLENOL) tablet 650 mg  650 mg Oral Q6H PRN    Or    acetaminophen (TYLENOL) suppository 650 mg  650 mg Rectal Q6H PRN    senna (SENOKOT) tablet 8.6 mg  1 Tab Oral DAILY PRN    insulin lispro (HUMALOG) injection   SubCUTAneous Q6H    lactated Ringers infusion  50 mL/hr IntraVENous CONTINUOUS    Vancomycin Intermittent Dosing   Other Rx Dosing/Monitoring    cefepime (MAXIPIME) 1 g in 0.9% sodium chloride (MBP/ADV) 50 mL  1 g IntraVENous Q24H    influenza vaccine 2020-21 (6 mos+)(PF) (FLUARIX/FLULAVAL/FLUZONE QUAD) injection 0.5 mL  0.5 mL IntraMUSCular PRIOR TO DISCHARGE         Objective:     Vitals:    10/20/20 2010 10/21/20 0025 10/21/20 0354 10/21/20 0802   BP: (!) 179/99 111/70 115/71 (!) 165/96   Pulse: (!) 103 91 89 91   Resp: 18 18 18 18   Temp: (!) 102.2 °F (39 °C) 99.3 °F (37.4 °C) 99.7 °F (37.6 °C) 98.4 °F (36.9 °C)   SpO2: 96% 97% 96% 97%   Weight:       Height:         Intake and Output:   10/19 1901 - 10/21 0700  In: -   Out: 1985 [EMOMA:6371; Drains:310]  10/21 0701 - 10/21 1900  In: -   Out: 100 [Drains:100]    Physical Exam:   Constitutional:  the patient is well developed and in no acute distress, alert and oriented  HEENT:  Sclera clear, pupils equal, oral mucosa moist  Lungs: clear bilaterally   Cardiovascular:  tachycardia, S1, S2, no Rub  Abd/GI: soft, mild tender with positive bowel sounds. abd incisions approximated, clean and dry  Ext: warm without cyanosis. There is no lower leg edema. Skin:  no jaundice or rashes  Neuro: left hemiparesis  Psychiatric: Calm. LAB  Recent Labs     10/21/20  0529 10/20/20  0430 10/19/20  0639 10/18/20  2019   WBC 8.5 8.8 9.8 12.5*   HGB 7.5* 8.3* 10.5* 8.7*   HCT 23.3* 25.9* 33.3* 27.5*    163 191 198   INR  --   --   --  1.1     Recent Labs     10/21/20  0529 10/20/20  0430 10/19/20  0639 10/18/20  2019   * 146* 144 142   K 3.8 3.9 4.3 4.2   * 116* 112* 109*   CO2 24 22 19* 23   * 90 63* 82   BUN 25* 39* 62* 73*   CREA 1.35 2.39* 4.81* 6.18*   MG  --  2.3  --  2.3   ALB  --  1.7* 1.7* 1.6*     No results for input(s): PH, PCO2, PO2, HCO3 in the last 72 hours.       Assessment:  (Medical Decision Making)     Hospital Problems  Date Reviewed: 3/3/2017          Codes Class Noted POA    Hypertensive urgency ICD-10-CM: I16.0  ICD-9-CM: 401.9  10/20/2020 Unknown        History of CVA (cerebrovascular accident) ICD-10-CM: Z86.73  ICD-9-CM: V12.54  10/19/2020 Unknown        * (Principal) Abscess of abdominal cavity [facility]) ICD-10-CM: K65.1  ICD-9-CM: 567.22  10/19/2020 Unknown        RLL pneumonia ICD-10-CM: J18.9  ICD-9-CM: 017  10/19/2020 Unknown        Left hemiparesis ([facility] ICD-10-CM: G81.94  ICD-9-CM: 342.90  10/19/2020 Unknown        Sepsis ([facility] ICD-10-CM: A41.9  ICD-9-CM: 038.9, 995.91  10/19/2020 Unknown        Sinus tachycardia ICD-10-CM: R00.0  ICD-9-CM: 427.89  10/19/2020 Unknown        LOKI (acute kidney injury) ([facility] ICD-10-CM: N17.9  ICD-9-CM: 584.9  10/19/2020 Unknown        Hypertension (Chronic) ICD-10-CM: I10  ICD-9-CM: 401.9  Unknown Yes              Plan:  (Medical Decision Making)   1. LOKI likely 2/2 pre renal azotemia in the setting of nausea, vomiting, Cr baseline 0.9-1.3. no evidence of obstructive nephropathy on renal imaging  Cr continues to improve, d/c IVF.      2. Right subhepatic fluid collection/ acute calculus cholecystitis  -s/p open cholecystectomy with perihepatic abscess drainage per Dr. Hannah Rae on 10/19  vanc level 14.8     3. RLL pneumonia Abx per primary      4. Hx CVA with Left hemiparesis      5. DM type II SSI per hosp     6. HTN increase hydralazine to 50 mg TID, titrate prn    Will sign off, please call with additional Nephrology questions. Thanks for the courtacy of this consultation.      Mau Palma NP

## 2020-10-21 NOTE — PROGRESS NOTES
Hospitalist Progress Note    10/21/2020  Admit Date: 10/18/2020  7:55 PM   NAME: Winston Mendoza. :  1975   MRN:  [id number]   Attending: Lucio Luong MD  PCP:  Piedad Ta MD    SUBJECTIVE:     Winston Mendoza. is a 50years old male with hx of CVA with left residual weakness, HTN, PFO s/p closure, DM-2 admitted on 10/19 for sepsis secondary to acute cholecystitis. Pt underwent open cholecystectomy and drainage of perihepatic abscess. Pt also had RLL infiltrate on CXR. He is currently on broad spectrum abx with atypical coverage with azithromycin. Pt has been tachycardic since admission, initial EKG with sinus tachy due to underlying sepsis. Also had UTI and Acute renal failure with Cr 6.18.    10/21:  Pt is resting in bed, reports feeling okay. Abdominal pain is better today, denies nausea/vomiting. Fever of 102.2 last night. Review of Systems negative with exception of pertinent positives noted above      PHYSICAL EXAM       Visit Vitals  /71 (BP 1 Location: Left arm, BP Patient Position: At rest)   Pulse 89   Temp 99.7 °F (37.6 °C)   Resp 18   Ht 5' 6\" (1.676 m)   Wt 72.6 kg (160 lb)   SpO2 96%   BMI 25.82 kg/m²      Temp (24hrs), Av.3 °F (37.9 °C), Min:99.3 °F (37.4 °C), Max:102.2 °F (39 °C)    Oxygen Therapy  O2 Sat (%): 96 % (10/21/20 0354)  Pulse via Oximetry: 102 beats per minute (10/19/20 1817)  O2 Device: Nasal cannula (10/21/20 0515)  O2 Flow Rate (L/min): 2 l/min (10/21/20 0515)    Intake/Output Summary (Last 24 hours) at 10/21/2020 0730  Last data filed at 10/20/2020 1400  Gross per 24 hour   Intake    Output 1190 ml   Net -1190 ml          General: Middle aged, NAD, lethargic  Head:  Atraumatic Normocephalic. Eyes:  PERRLA, EOMI, Anicteric. ENT:  No discharges/lesions. Lungs:  CTA Bilaterally. CVS:  Regular rate and rhythm,  No murmur, rub, or gallop, No JVD, No lower   extremity edema.   Abdomen: Soft, with clean surgical incision and staples, bowel sounds sluggish, minimally tender only on deep palpation in RUQ  MSK:  No deformities, lesions, Spontaneously moves extremities. Neurologic:  gcs 15, no motor or sensory deficits  Psychiatry:      Flat affect, AO x3  Skin:   No rash/lesions. Good skin turgor  Heme/Lymph/Immune:  No petechiae, ecchymoses, overt signs of bleeding or    lymphadenopathy noted.     Recent Results (from the past 24 hour(s))   GLUCOSE, POC    Collection Time: 10/20/20 11:51 AM   Result Value Ref Range    Glucose (POC) 109 (H) 65 - 100 mg/dL   GLUCOSE, POC    Collection Time: 10/20/20  5:31 PM   Result Value Ref Range    Glucose (POC) 174 (H) 65 - 100 mg/dL   VANCOMYCIN, RANDOM    Collection Time: 10/20/20 10:42 PM   Result Value Ref Range    Vancomycin, random 14.8 UG/ML   GLUCOSE, POC    Collection Time: 10/20/20 11:43 PM   Result Value Ref Range    Glucose (POC) 123 (H) 65 - 505 mg/dL   METABOLIC PANEL, BASIC    Collection Time: 10/21/20  5:29 AM   Result Value Ref Range    Sodium 146 (H) 136 - 145 mmol/L    Potassium 3.8 3.5 - 5.1 mmol/L    Chloride 117 (H) 98 - 107 mmol/L    CO2 24 21 - 32 mmol/L    Anion gap 5 (L) 7 - 16 mmol/L    Glucose 104 (H) 65 - 100 mg/dL    BUN 25 (H) 6 - 23 MG/DL    Creatinine 1.35 0.8 - 1.5 MG/DL    GFR est AA >60 >60 ml/min/1.73m2    GFR est non-AA >60 >60 ml/min/1.73m2    Calcium 8.5 8.3 - 10.4 MG/DL   CBC W/O DIFF    Collection Time: 10/21/20  5:29 AM   Result Value Ref Range    WBC 8.5 4.3 - 11.1 K/uL    RBC 2.89 (L) 4.23 - 5.6 M/uL    HGB 7.5 (L) 13.6 - 17.2 g/dL    HCT 23.3 (L) 41.1 - 50.3 %    MCV 80.6 79.6 - 97.8 FL    MCH 26.0 (L) 26.1 - 32.9 PG    MCHC 32.2 31.4 - 35.0 g/dL    RDW 15.9 (H) 11.9 - 14.6 %    PLATELET 930 859 - 184 K/uL    MPV 12.1 9.4 - 12.3 FL    ABSOLUTE NRBC 0.02 0.0 - 0.2 K/uL   GLUCOSE, POC    Collection Time: 10/21/20  6:46 AM   Result Value Ref Range    Glucose (POC) 118 (H) 65 - 100 mg/dL         Imaging /Procedures /Studies   All diagnostic imaging personally reviewed by me.    CTAP:  IMPRESSION:      Findings worrisome for acute calculus cholecystitis.     Possible subhepatic abscess.     Small amount of ascites in the right anterior subphrenic space.     Right lower lobe pneumonia.     Status post ASD closure.       Evaluation of the abdominal viscera is limited without intravenous contrast.    EXAM:  US RETROPERITONEUM COMP     INDICATION:  LOKI     COMPARISON: None.     TECHNIQUE:  Real-time sonography of the kidneys, retroperitoneum and bladder was performed  with multiple static images obtained.     FINDINGS:  The kidneys have increased echogenicity with no mass, stone or hydronephrosis. The right kidney measures 12.1 cm and the left kidney measures 11.1 cm in  length.     The aorta tapers normally. The proximal iliac arteries are normal.  The IVC is  normal. No retroperitoneal mass is identified.     The urinary bladder is normal. Abdominal ascites noted.     IMPRESSION  IMPRESSION:   1. Increased renal echogenicity. Findings compatible with chronic medical renal  disease.   2. Abdominal ascites       ASSESSMENT      Hospital Problems as of 10/21/2020 Date Reviewed: 3/3/2017          Codes Class Noted - Resolved POA    Hypertensive urgency ICD-10-CM: I16.0  ICD-9-CM: 401.9  10/20/2020 - Present Unknown        History of CVA (cerebrovascular accident) ICD-10-CM: Z86.73  ICD-9-CM: V12.54  10/19/2020 - Present Unknown        * (Principal) [facility] ([facility] ICD-10-CM: K65.1  ICD-9-CM: 567.22  10/19/2020 - Present Unknown        RLL pneumonia ICD-10-CM: J18.9  ICD-9-CM: 588  10/19/2020 - Present Unknown        Left hemiparesis ([facility] ICD-10-CM: G81.94  ICD-9-CM: 342.90  10/19/2020 - Present Unknown        Sepsis ([facility] ICD-10-CM: A41.9  ICD-9-CM: 038.9, 995.91  10/19/2020 - Present Unknown        Sinus tachycardia ICD-10-CM: R00.0  ICD-9-CM: 427.89  10/19/2020 - Present Unknown        LOKI (acute kidney injury) ([facility] ICD-10-CM: N17.9  ICD-9-CM: 584.9  10/19/2020 - Present Unknown        Hypertension (Chronic) ICD-10-CM: I10  ICD-9-CM: 401.9  Unknown - Present Yes        Type 2 diabetes mellitus (HCC) (Chronic) ICD-10-CM: E11.9  ICD-9-CM: 250.00  Unknown - Present                   Plan:  - pt is s/p open lap eve done on 10/19/20  Pt is on GI soft diet  Pain control is suboptimal, continue prn narcotics IV an oral  Post op care and resumption of oral diet deferred to General Surgery  Continue IV vancomycin and cefepime for now. Continue azithromycin for atypical coverage in view of RLL infiltrates. Continue hydralazine 25 mg po TID and labetalol 100 mg po bid for hypertension. Sinus tachycardia from underlying sepsis and pain. HR is better controlled today  Continue other meds as reconciled in [facility]     Acute renal failure is improving, Cr down to 1.35 from 2.39    IV fluids, LR @ 125 cc/hr    PT/OT once medically cleared    GI soft diet    DVT Prophylaxis: SCD's  High risk with opioids on board  Dispo: pending until medically cleared    Petra Kang MD

## 2020-10-21 NOTE — PROGRESS NOTES
Problem: Self Care Deficits Care Plan (Adult)  Goal: *Acute Goals and Plan of Care (Insert Text)  Outcome: Progressing Towards Goal  Note: 1. Pt will toilet with CGA   2. Pt will complete functional mobility for ADLs with min A  3. Pt will complete upper body dressing with min A using AE as needed  4. Pt will complete grooming and hygiene with set up   5. Pt will demonstrate independence with HEP to promote increased BUE strength and functional use for ADLs  6. Pt will tolerate 23 minutes functional activity with min or fewer rest breaks to promote increased endurance for ADLs  7. Pt will complete bed mobility with CGA in prep for ADLs    Timeframe: 7 days       OCCUPATIONAL THERAPY: Daily Note and AM    10/21/2020  INPATIENT: OT Visit Days: 2  Payor: [address] [place] [address] [address] / Plan: [address] [place] [address] / Product Type: Medicaid /      NAME/AGE/GENDER: Ezra Pitts. is a 50 y.o. male   PRIMARY DIAGNOSIS:  Sepsis ([facility] [A41.9] Abscess of abdominal cavity (HCC) Abscess of abdominal cavity (HCC)  Procedure(s) (LRB):  ATEMPTED LAPAROSCOPIC CHOLECYSTECTOMY CONVERTED TO OPEN (N/A)  2 Days Post-Op  ICD-10: Treatment Diagnosis:    · Generalized Muscle Weakness (M62.81)   Precautions/Allergies:    falls Patient has no known allergies. ASSESSMENT:     Mr. Christine Choudhury presents with sepsis with multi organ failure d/t gangrenous cholecystitis, s/p the above procedure. Pt has a hx of CVA with L hemiparesis and is s/p recent prolonged hospitalization. Pt reports that he has been at [facility] home\" since last d/c and requires assistance for bathing and dressing but is independent with toileting, grooming/hygiene, and feeding. Pt stated that he is able to complete bed mobility and is ambulatory with a RW w/o assistance. 10/21/2020 Pt participated in standing with max/total assist x's 2. Student nurse in room and pt required total assist with bathing and dressing.  Pt up in chair with posey on and belongings in reach. Continue POC. This section established at most recent assessment   PROBLEM LIST (Impairments causing functional limitations):  1. Decreased Strength  2. Decreased ADL/Functional Activities  3. Decreased Transfer Abilities  4. Decreased Balance  5. Increased Pain  6. Decreased Activity Tolerance  7. Increased Fatigue  8. Decreased Flexibility/Joint Mobility  9. Decreased Cognition   INTERVENTIONS PLANNED: (Benefits and precautions of occupational therapy have been discussed with the patient.)  1. Activities of daily living training  2. Adaptive equipment training  3. Balance training  4. Therapeutic activity  5. Therapeutic exercise     TREATMENT PLAN: Frequency/Duration: Follow patient 3 times/ week to address above goals. Rehabilitation Potential For Stated Goals: [facility] (at time of discharge pending progress):    Placement: It is my opinion, based on this patient's performance to date, that Mr. Pao Michael may benefit from d/c back to [facility]. Equipment:    None at this time              OCCUPATIONAL PROFILE AND HISTORY:   History of Present Injury/Illness (Reason for Referral):  See H&P  Past Medical History/Comorbidities:   Mr. Pao Michael  has a past medical history of Acute ischemic stroke ([address] (12/12/2019), Acute pancreatitis (11/19/2014), Cerebrovascular accident (CVA) due to embolism ([address] (12/12/2019), Diabetes ([address] (2002), Diabetes ([address] Diabetes mellitus, and Hypertension. He also has no past medical history of Arthritis, Asthma, Autoimmune disease ([address] CAD (coronary artery disease), Cancer ([address] Chronic kidney disease, COPD, Dementia, Dementia ([address] Heart failure ([address] Ill-defined condition, Infectious disease, Liver disease, Other ill-defined conditions(799.89), Psychiatric disorder, PUD (peptic ulcer disease), Seizures ([address] or Sleep disorder. Mr. Pao Michael  has a past surgical history that includes hx hernia repair and hx orthopaedic.   Social History/Living Environment:   Home Environment: Skilled nursing facility  One/Two Story Residence: One story  Living Alone: No  Support Systems: Skilled nursing facility  Patient Expects to be Discharged to[de-identified] [address] [place] [address]  Current DME Used/Available at Home: tiffany Villanueva, [address] [facility] chair  Prior Level of Function/Work/Activity:  See H&P     Number of Personal Factors/Comorbidities that affect the Plan of Care: Expanded review of therapy/medical records (1-2):  MODERATE COMPLEXITY   ASSESSMENT OF OCCUPATIONAL PERFORMANCE[de-identified]   Activities of Daily Living:   Basic ADLs (From Assessment) Complex ADLs (From Assessment)   Feeding: Minimum assistance  Oral Facial Hygiene/Grooming: Minimum assistance  Bathing: Maximum assistance  Upper Body Dressing: Moderate assistance  Lower Body Dressing: Total assistance  Toileting: Total assistance Instrumental ADL  Meal Preparation: Total assistance  Homemaking: Total assistance   Grooming/Bathing/Dressing Activities of Daily Living               Lower Body Bathing  Bathing Assistance: Total assistance(dependent)  Perineal  : Total assistance (dependent)  Position Performed: Standing  Lower Body : Total assistance (dependent)  Position Performed: Seated in chair                   Most Recent Physical Functioning:   Gross Assessment:                  Posture:     Balance:  Sitting: Impaired  Sitting - Static: Fair (occasional)  Sitting - Dynamic: Fair (occasional)  Standing: Impaired  Standing - Static: Constant support;Poor Bed Mobility:     Wheelchair Mobility:     Transfers:               Patient Vitals for the past 6 hrs:   BP BP Patient Position SpO2 Pulse   10/21/20 0802 (!) 165/96 At rest 97 % 91   10/21/20 1140 (!) 141/92 At rest 98 % 80       Mental Status  Neurologic State: Alert, Eyes open to stimulus  Orientation Level: Oriented to person, Oriented to place  Cognition: Follows commands                          Physical Skills Involved:  1.  Range of Motion  2. Balance  3. Strength  4. Activity Tolerance  5. Pain (acute) Cognitive Skills Affected (resulting in the inability to perform in a timely and safe manner):  1. Executive Function  2. Sustained Attention  3. Divided Attention Psychosocial Skills Affected:  1. Habits/Routines   Number of elements that affect the Plan of Care: 5+:  HIGH COMPLEXITY   CLINICAL DECISION MAKIN[address] [place] [address] AM-PAC 6 Clicks   Daily Activity Inpatient Short Form  How much help from another person does the patient currently need. .. Total A Lot A Little None   1. Putting on and taking off regular lower body clothing? [x] 1   [] 2   [] 3   [] 4   2. Bathing (including washing, rinsing, drying)? [] 1   [x] 2   [] 3   [] 4   3. Toileting, which includes using toilet, bedpan or urinal?   [] 1   [x] 2   [] 3   [] 4   4. Putting on and taking off regular upper body clothing? [] 1   [x] 2   [] 3   [] 4   5. Taking care of personal grooming such as brushing teeth? [] 1   [] 2   [x] 3   [] 4   6. Eating meals? [] 1   [] 2   [x] 3   [] 4   © , Trustees of [address] [place] [address], under license to [employer]. All rights reserved      Score:  Initial: 13 Most Recent: X (Date: -- )    Interpretation of Tool:  Represents activities that are increasingly more difficult (i.e. Bed mobility, Transfers, Gait). Medical Necessity:     · Patient is expected to demonstrate progress in   · strength, balance, and functional technique  ·  to   · decrease assistance required with ADLs  · .  Reason for Services/Other Comments:  · Patient   · continues to require present interventions due to patient's inability to complete ADLs  · .    Use of outcome tool(s) and clinical judgement create a POC that gives a: MODERATE COMPLEXITY         TREATMENT:   (In addition to Assessment/Re-Assessment sessions the following treatments were rendered)     Pre-treatment Symptoms/Complaints:    Pain: Initial:   Pain Intensity 1: 0  Post Session:  8 Therapeutic Activity: (12 minutes): Therapeutic activities including Chair transfers and sit to stand transfers to improve mobility, strength and balance. Required maximal/total assist  to promote static balance in standing. Self Care: (12 minutes): Procedure(s) (per grid) utilized to improve and/or restore self-care/home management as related to dressing and bathing. Required maximal verbal, manual and   cueing to facilitate activities of daily living skills. Braces/Orthotics/Lines/Etc:   · O2 Device: Nasal cannula  Treatment/Session Assessment:    · Response to Treatment:  fatigue  · Interdisciplinary Collaboration:   o Certified Occupational Therapy Assistant  o Registered Nurse  · After treatment position/precautions:   o Up in chair  o Bed alarm/tab alert on  o Bed/Chair-wheels locked  o Bed in low position  o Call light within reach  o RN notified   · Compliance with Program/Exercises: Will assess as treatment progresses. · Recommendations/Intent for next treatment session: \"Next visit will focus on advancements to more challenging activities and reduction in assistance provided\".   Total Treatment Duration:  OT Patient Time In/Time Out  Time In: [address]  Time Out: [facility]

## 2020-10-21 NOTE — PROGRESS NOTES
SPEECH PATHOLOGY NOTE:    Patient politely declined po intake reporting abdominal pain. RN notified. Will continue to follow.       Teresa Beltran MS, CCC-SLP

## 2020-10-21 NOTE — PROGRESS NOTES
Reassessment complete and patient resting in bed with 02 at 2 liters via NC without any breathing difficulty. No pain or distress noted.  Bed in lowest with call light in reach

## 2020-10-21 NOTE — PROGRESS NOTES
Pharmacokinetic Consult to Pharmacist    Sabrina Paulino. is a 50 y.o. male being treated  with vancomycin. Height: 5' 6\" (167.6 cm)  Weight: 72.6 kg (160 lb)  Lab Results   Component Value Date/Time    BUN 25 (H) 10/21/2020 05:29 AM    Creatinine 1.35 10/21/2020 05:29 AM    WBC 8.5 10/21/2020 05:29 AM    Procalcitonin 114.66 10/18/2020 08:19 PM    Lactic acid 1.6 10/18/2020 08:19 PM      Estimated Creatinine Clearance: 61 mL/min (based on SCr of 1.35 mg/dL). Day 4 of vancomycin. Goal trough is 15-20. Random level of 14.8 (10/20 @7442). Recommend to proceed with vancomycin 1250mg x 1 dose. Will obtain vancomycin level prior to next dose ([email or web address]). Will continue to follow patient and order levels when clinically indicated.     Thank you,  [facility]

## 2020-10-22 LAB
ANION GAP SERPL CALC-SCNC: 7 MMOL/L (ref 7–16)
BASOPHILS # BLD: 0 K/UL (ref 0–0.2)
BASOPHILS NFR BLD: 0 % (ref 0–2)
BUN SERPL-MCNC: 20 MG/DL (ref 6–23)
CALCIUM SERPL-MCNC: 8.4 MG/DL (ref 8.3–10.4)
CHLORIDE SERPL-SCNC: 114 MMOL/L (ref 98–107)
CO2 SERPL-SCNC: 25 MMOL/L (ref 21–32)
CREAT SERPL-MCNC: 1.21 MG/DL (ref 0.8–1.5)
DIFFERENTIAL METHOD BLD: ABNORMAL
EOSINOPHIL # BLD: 0.1 K/UL (ref 0–0.8)
EOSINOPHIL NFR BLD: 2 % (ref 0.5–7.8)
ERYTHROCYTE [DISTWIDTH] IN BLOOD BY AUTOMATED COUNT: 15.9 % (ref 11.9–14.6)
GLUCOSE BLD STRIP.AUTO-MCNC: 106 MG/DL (ref 65–100)
GLUCOSE BLD STRIP.AUTO-MCNC: 109 MG/DL (ref 65–100)
GLUCOSE BLD STRIP.AUTO-MCNC: 113 MG/DL (ref 65–100)
GLUCOSE BLD STRIP.AUTO-MCNC: 125 MG/DL (ref 65–100)
GLUCOSE SERPL-MCNC: 105 MG/DL (ref 65–100)
HCT VFR BLD AUTO: 24.6 % (ref 41.1–50.3)
HGB BLD-MCNC: 8.1 G/DL (ref 13.6–17.2)
IMM GRANULOCYTES # BLD AUTO: 0.1 K/UL (ref 0–0.5)
IMM GRANULOCYTES NFR BLD AUTO: 1 % (ref 0–5)
LYMPHOCYTES # BLD: 1.1 K/UL (ref 0.5–4.6)
LYMPHOCYTES NFR BLD: 14 % (ref 13–44)
MCH RBC QN AUTO: 26.1 PG (ref 26.1–32.9)
MCHC RBC AUTO-ENTMCNC: 32.9 G/DL (ref 31.4–35)
MCV RBC AUTO: 79.4 FL (ref 79.6–97.8)
MONOCYTES # BLD: 0.5 K/UL (ref 0.1–1.3)
MONOCYTES NFR BLD: 6 % (ref 4–12)
NEUTS SEG # BLD: 6.1 K/UL (ref 1.7–8.2)
NEUTS SEG NFR BLD: 77 % (ref 43–78)
NRBC # BLD: 0.02 K/UL (ref 0–0.2)
PLATELET # BLD AUTO: 224 K/UL (ref 150–450)
PMV BLD AUTO: 11.9 FL (ref 9.4–12.3)
POTASSIUM SERPL-SCNC: 3.6 MMOL/L (ref 3.5–5.1)
RBC # BLD AUTO: 3.1 M/UL (ref 4.23–5.6)
SODIUM SERPL-SCNC: 146 MMOL/L (ref 136–145)
VANCOMYCIN SERPL-MCNC: 14.6 UG/ML
WBC # BLD AUTO: 7.9 K/UL (ref 4.3–11.1)

## 2020-10-22 PROCEDURE — 85025 COMPLETE CBC W/AUTO DIFF WBC: CPT

## 2020-10-22 PROCEDURE — 74011250637 HC RX REV CODE- 250/637: Performed by: HOSPITALIST

## 2020-10-22 PROCEDURE — 74011250636 HC RX REV CODE- 250/636: Performed by: SURGERY

## 2020-10-22 PROCEDURE — 74011250636 HC RX REV CODE- 250/636: Performed by: HOSPITALIST

## 2020-10-22 PROCEDURE — 82962 GLUCOSE BLOOD TEST: CPT

## 2020-10-22 PROCEDURE — 74011250637 HC RX REV CODE- 250/637: Performed by: NURSE PRACTITIONER

## 2020-10-22 PROCEDURE — 74011000258 HC RX REV CODE- 258: Performed by: HOSPITALIST

## 2020-10-22 PROCEDURE — 65270000029 HC RM PRIVATE

## 2020-10-22 PROCEDURE — 74011250637 HC RX REV CODE- 250/637: Performed by: SURGERY

## 2020-10-22 PROCEDURE — 80202 ASSAY OF VANCOMYCIN: CPT

## 2020-10-22 PROCEDURE — 36415 COLL VENOUS BLD VENIPUNCTURE: CPT

## 2020-10-22 PROCEDURE — 74011250636 HC RX REV CODE- 250/636: Performed by: NURSE PRACTITIONER

## 2020-10-22 PROCEDURE — 80048 BASIC METABOLIC PNL TOTAL CA: CPT

## 2020-10-22 RX ORDER — GUAIFENESIN 100 MG/5ML
81 LIQUID (ML) ORAL DAILY
Status: DISCONTINUED | OUTPATIENT
Start: 2020-10-23 | End: 2020-10-24 | Stop reason: HOSPADM

## 2020-10-22 RX ORDER — VANCOMYCIN HYDROCHLORIDE
1250 ONCE
Status: COMPLETED | OUTPATIENT
Start: 2020-10-22 | End: 2020-10-22

## 2020-10-22 RX ORDER — CLOPIDOGREL BISULFATE 75 MG/1
75 TABLET ORAL DAILY
Status: DISCONTINUED | OUTPATIENT
Start: 2020-10-23 | End: 2020-10-24 | Stop reason: HOSPADM

## 2020-10-22 RX ADMIN — HYDRALAZINE HYDROCHLORIDE 50 MG: 50 TABLET, FILM COATED ORAL at 15:59

## 2020-10-22 RX ADMIN — LABETALOL HYDROCHLORIDE 100 MG: 100 TABLET, FILM COATED ORAL at 17:56

## 2020-10-22 RX ADMIN — HYDROCODONE BITARTRATE AND ACETAMINOPHEN 1 TABLET: 7.5; 325 TABLET ORAL at 09:10

## 2020-10-22 RX ADMIN — HEPARIN SODIUM 5000 UNITS: 5000 INJECTION INTRAVENOUS; SUBCUTANEOUS at 09:10

## 2020-10-22 RX ADMIN — ATORVASTATIN CALCIUM 80 MG: 80 TABLET, FILM COATED ORAL at 21:48

## 2020-10-22 RX ADMIN — VANCOMYCIN HYDROCHLORIDE 1250 MG: 10 INJECTION, POWDER, LYOPHILIZED, FOR SOLUTION INTRAVENOUS at 03:43

## 2020-10-22 RX ADMIN — LABETALOL HYDROCHLORIDE 100 MG: 100 TABLET, FILM COATED ORAL at 09:10

## 2020-10-22 RX ADMIN — HYDRALAZINE HYDROCHLORIDE 50 MG: 50 TABLET, FILM COATED ORAL at 09:10

## 2020-10-22 RX ADMIN — Medication 5 ML: at 13:26

## 2020-10-22 RX ADMIN — Medication 10 ML: at 21:48

## 2020-10-22 RX ADMIN — HYDRALAZINE HYDROCHLORIDE 50 MG: 50 TABLET, FILM COATED ORAL at 21:48

## 2020-10-22 RX ADMIN — CEFEPIME HYDROCHLORIDE 1 G: 1 INJECTION, POWDER, FOR SOLUTION INTRAMUSCULAR; INTRAVENOUS at 21:45

## 2020-10-22 RX ADMIN — Medication 10 ML: at 06:34

## 2020-10-22 RX ADMIN — HYDROCODONE BITARTRATE AND ACETAMINOPHEN 1 TABLET: 7.5; 325 TABLET ORAL at 17:56

## 2020-10-22 NOTE — PROGRESS NOTES
H&P/Consult Note/Progress Note/Office Note:   Kannan King MRN: [id number]  :1975  Age:45 y.o.    HPI: Kannan King is a 50 y.o. male who has PMHx of stroke, HTN, DM, who we are asked by hospitalist to see for intraabdominal abscess. He was transferred from his nursing home  on 10/18 due to N/V and abdominal pain. In the ED, pt was found to have WBC 12 and fever. He was started on abx. Tbili 0.6, Alk Phos and AST elevated. CT abdomen revealed possible cholecystitis which was confirmed on US. General surgery was consulted for evaluation of cholecystitis with subhepatic abscess. 10/20/20 Patient in bed this AM. Open cholecystectomy yesterday. Dressing C/D/I, ashley drain maintained. Tachy and hypertensive, will add better pain control. 10/21/2020- Patient in bed. Fever last evening 102. 2. tolerating GI soft diet. Incision staples without redness or drainage. Ashley drain with serosanguineous drainage noted    10/22/2020- tolerating diet, AF, no complaints. Staples without redness or drainage. TK with serous drainage. US 10/19/2020:  IMPRESSION: Findings consistent with acute cholecystitis.     Right subhepatic fluid collection as seen on prior CT.  Possibly represent an  abscess.     Hepatomegaly with diffuse fatty change.       CTAP 10/18/2020:  IMPRESSION:      Findings worrisome for acute calculus cholecystitis.     Possible subhepatic abscess.     Small amount of ascites in the right anterior subphrenic space.     Right lower lobe pneumonia.     Status post ASD closure.       Evaluation of the abdominal viscera is limited without intravenous contrast.    Past Medical History:   Diagnosis Date    Acute ischemic stroke ([address] 2019    Acute pancreatitis 2014    Cerebrovascular accident (CVA) due to embolism ([address] 2019    Diabetes ([address] 2002    Diabetes ([address]     Diabetes mellitus     Hypertension      Past Surgical History:   Procedure Laterality Date    HX HERNIA REPAIR      HX ORTHOPAEDIC      carpal tunnel surgery     Current Facility-Administered Medications   Medication Dose Route Frequency    cefepime (MAXIPIME) 1 g in 0.9% sodium chloride (MBP/ADV) 50 mL  1 g IntraVENous Q12H    polyethylene glycol (MIRALAX) packet 17 g  17 g Oral DAILY    bisacodyL (DULCOLAX) suppository 10 mg  10 mg Rectal DAILY PRN    hydrALAZINE (APRESOLINE) tablet 50 mg  50 mg Oral TID    metoprolol (LOPRESSOR) injection 2.5 mg  2.5 mg IntraVENous Q6H PRN    HYDROmorphone (PF) (DILAUDID) injection 0.5 mg  0.5 mg IntraVENous Q4H PRN    HYDROcodone-acetaminophen (NORCO) 7.5-325 mg per tablet 1 Tab  1 Tab Oral Q4H PRN    labetaloL (NORMODYNE) tablet 100 mg  100 mg Oral BID    heparin (porcine) injection 5,000 Units  5,000 Units SubCUTAneous Q12H    atorvastatin (LIPITOR) tablet 80 mg  80 mg Oral QHS    sodium chloride (NS) flush 5-40 mL  5-40 mL IntraVENous Q8H    sodium chloride (NS) flush 5-40 mL  5-40 mL IntraVENous PRN    acetaminophen (TYLENOL) tablet 650 mg  650 mg Oral Q6H PRN    Or    acetaminophen (TYLENOL) suppository 650 mg  650 mg Rectal Q6H PRN    senna (SENOKOT) tablet 8.6 mg  1 Tab Oral DAILY PRN    insulin lispro (HUMALOG) injection   SubCUTAneous Q6H    influenza vaccine 2020-21 (6 mos+)(PF) (FLUARIX/FLULAVAL/FLUZONE QUAD) injection 0.5 mL  0.5 mL IntraMUSCular PRIOR TO DISCHARGE     Patient has no known allergies.   Social History     Socioeconomic History    Marital status:      Spouse name: Not on file    Number of children: Not on file    Years of education: Not on file    Highest education level: Not on file   Tobacco Use    Smoking status: Former Smoker     Packs/day: 0.25     Types: Cigarettes     Last attempt to quit: 2019     Years since quittin.8    Smokeless tobacco: Former User     Types: Chew   Substance and Sexual Activity    Alcohol use: No    Drug use: No    Sexual activity: Yes     Partners: Female Birth control/protection: None     Social History     Tobacco Use   Smoking Status Former Smoker    Packs/day: 0.25    Types: Cigarettes    Last attempt to quit: 2019    Years since quittin.8   Smokeless Tobacco Former User    Types: [de-identified]     Family History   Problem Relation Age of Onset    Diabetes Mother     Diabetes Father      ROS: The patient has no difficulty with chest pain or shortness of breath. No fever or chills. Comprehensive review of systems was otherwise unremarkable except as noted above. Physical Exam:   Visit Vitals  BP (!) 172/93 (BP 1 Location: Right arm, BP Patient Position: At rest)   Pulse 98   Temp 99.4 °F (37.4 °C)   Resp 18   Ht 5' 6\" (1.676 m)   Wt 160 lb (72.6 kg)   SpO2 94%   BMI 25.82 kg/m²     Constitutional: Alert, oriented, cooperative patient in no acute distress; appears stated age    Eyes:Sclera are clear. EOMs intact  ENMT: no external lesions gross hearing normal; no obvious neck masses, no ear or lip lesions, nares normal  CV: RRR. Normal perfusion  Resp: No JVD. Breathing is  non-labored; no audible wheezing. GI: soft and non-distended; appropriately tender.  Caleb C/D/I  ashley drain maintained with serous drainage  Musculoskeletal: left hemiparesis  Neuro:  Oriented;left hemiparesis  Psychiatric: normal affect and mood, no memory impairment    Recent vitals (if inpt):  Patient Vitals for the past 24 hrs:   BP Temp Pulse Resp SpO2   10/22/20 0757 (!) 172/93 99.4 °F (37.4 °C) 98 18 94 %   10/22/20 0413 (!) 164/91 100 °F (37.8 °C) 90 18 98 %   10/21/20 2347 (!) 165/92 98.2 °F (36.8 °C) 91 18 99 %   10/21/20 1935 (!) 150/89 99.4 °F (37.4 °C) 84 18 97 %   10/21/20 1628 (!) 160/93 98.6 °F (37 °C) 76 18 95 %   10/21/20 1140 (!) 141/92 98.8 °F (37.1 °C) 80 18 98 %       Labs:  Recent Labs     10/22/20  0120  10/20/20  0430   WBC 7.9   < > 8.8   HGB 8.1*   < > 8.3*      < > 163   *   < > 146*   K 3.6   < > 3.9   *   < > 116*   CO2 25   < > 22   BUN 20   < > 39*   CREA 1.21   < > 2.39*   *   < > 90   TBILI  --   --  0.5   ALT  --   --  23   AP  --   --  159*    < > = values in this interval not displayed. Lab Results   Component Value Date/Time    WBC 7.9 10/22/2020 01:20 AM    HGB 8.1 (L) 10/22/2020 01:20 AM    PLATELET 661 25/49/3439 01:20 AM    Sodium 146 (H) 10/22/2020 01:20 AM    Potassium 3.6 10/22/2020 01:20 AM    Chloride 114 (H) 10/22/2020 01:20 AM    CO2 25 10/22/2020 01:20 AM    BUN 20 10/22/2020 01:20 AM    Creatinine 1.21 10/22/2020 01:20 AM    Glucose 105 (H) 10/22/2020 01:20 AM    INR 1.1 10/18/2020 08:19 PM    aPTT 35.1 10/18/2020 08:19 PM    Bilirubin, total 0.5 10/20/2020 04:30 AM    ALT (SGPT) 23 10/20/2020 04:30 AM    Alk. phosphatase 159 (H) 10/20/2020 04:30 AM    Lipase 277 10/18/2020 08:19 PM    Troponin-I, Qt. <0.04 11/19/2014 04:10 PM       I reviewed recent labs and recent radiologic studies. I independently reviewed radiology images for studies I described above or studies I have ordered.    Admission date (for inpatients): 10/18/2020   * No surgery date entered *  Procedure(s):  ATEMPTED LAPAROSCOPIC CHOLECYSTECTOMY CONVERTED TO OPEN    ASSESSMENT/PLAN:  Problem List  Date Reviewed: 3/3/2017          Codes Class Noted    Hypertensive urgency ICD-10-CM: I16.0  ICD-9-CM: 401.9  10/20/2020        History of CVA (cerebrovascular accident) ICD-10-CM: Z86.73  ICD-9-CM: V12.54  10/19/2020        * (Principal) Abscess [facility] abdominal cavity ([facility] ICD-10-CM: K65.1  ICD-9-CM: 567.22  10/19/2020        RLL pneumonia ICD-10-CM: J18.9  ICD-9-CM: 486  10/19/2020        Left hemiparesis ([place] [address] ICD-10-CM: G81.94  ICD-9-CM: 342.90  10/19/2020        Sepsis ([place] [address] ICD-10-CM: A41.9  ICD-9-CM: 038.9, 995.91  10/19/2020        Sinus tachycardia ICD-10-CM: R00.0  ICD-9-CM: 427.89  10/19/2020        LOKI (acute kidney injury) ([place] [address] ICD-10-CM: N17.9  ICD-9-CM: 584.9  10/19/2020        Hypomagnesemia ICD-10-CM: Z07.84  ICD-9-CM: 275.2 3/7/2020        PFO (patent foramen ovale) ICD-10-CM: Q21.1  ICD-9-CM: 745.5  12/17/2019        Arrhythmia ICD-10-CM: I49.9  ICD-9-CM: 427.9  12/15/2019        Hyperlipemia ICD-10-CM: E78.5  ICD-9-CM: 272.4  12/15/2019        Acute embolic stroke [facility]) KXL-80-AZ: I63.9  ICD-9-CM: 434.11  12/12/2019        Microcytic anemia ICD-10-CM: D50.9  ICD-9-CM: 280.9  11/20/2014        Acute pancreatitis ICD-10-CM: K85.90  ICD-9-CM: 443.0  11/19/2014        GERD (gastroesophageal reflux disease) (Chronic) ICD-10-CM: K21.9  ICD-9-CM: 530.81  11/19/2014        Hypertension (Chronic) ICD-10-CM: I10  ICD-9-CM: 401.9  Unknown        Type 2 diabetes mellitus (HCC) (Chronic) ICD-10-CM: E11.9  ICD-9-CM: 250.00  Unknown            Principal Problem:    Abscess of abdominal cavity ([facility] (10/19/2020)    Active Problems:    Hypertension ()      History of CVA (cerebrovascular accident) (10/19/2020)      RLL pneumonia (10/19/2020)      Left hemiparesis ([facility] (10/19/2020)      Sepsis ([facility] (10/19/2020)      Sinus tachycardia (10/19/2020)      LOKI (acute kidney injury) ([facility] (10/19/2020)      Hypertensive urgency (10/20/2020)       Plan:  Continue care per primary  OK to restart Plavix tomorrow  DC Richard drain today  Recommend DC bennett  Follow up with Dr. Toma Low in one week  Activity as tolerated- no heavy lifting  Low fat diet      Raymond Navarro, MARCELO

## 2020-10-22 NOTE — PROGRESS NOTES
OT note:  Pt refused therapy despite encouragement. Will re-attempt at a later date.   Brandy Botello

## 2020-10-22 NOTE — PROGRESS NOTES
Hospitalist Progress Note    10/22/2020  Admit Date: 10/18/2020  7:55 PM   NAME: Nik Gaona. :  1975   MRN:  [id number]   Attending: Vic Forbes MD  PCP:  Abrahan Carrillo MD    SUBJECTIVE:     Nik Gaona. is a 50years old male with hx of CVA with left residual weakness, HTN, PFO s/p closure, DM-2 admitted on 10/19 for sepsis secondary to acute cholecystitis. Pt underwent open cholecystectomy and drainage of perihepatic abscess. Pt also had RLL infiltrate on CXR. He is currently on broad spectrum abx with atypical coverage with azithromycin. Pt has been tachycardic since admission, initial EKG with sinus tachy due to underlying sepsis. Also had UTI and Acute renal failure with Cr 6.18.    10/22:  Pt is resting in bed, reports feeling okay. Abdominal pain is improving, denies any nausea/vomiting. No fever, but had a temp of 100.0  No other overnight events. Review of Systems negative with exception of pertinent positives noted above      PHYSICAL EXAM       Visit Vitals  BP (!) 172/93 (BP 1 Location: Right arm, BP Patient Position: At rest)   Pulse 98   Temp 99.4 °F (37.4 °C)   Resp 18   Ht 5' 6\" (1.676 m)   Wt 72.6 kg (160 lb)   SpO2 94%   BMI 25.82 kg/m²      Temp (24hrs), Av.1 °F (37.3 °C), Min:98.2 °F (36.8 °C), Max:100 °F (37.8 °C)    Oxygen Therapy  O2 Sat (%): 94 % (10/22/20 0757)  Pulse via Oximetry: 102 beats per minute (10/19/20 1817)  O2 Device: Nasal cannula (10/21/20 0515)  O2 Flow Rate (L/min): 2 l/min (10/21/20 0515)    Intake/Output Summary (Last 24 hours) at 10/22/2020 0810  Last data filed at 10/22/2020 0615  Gross per 24 hour   Intake    Output 1880 ml   Net -1880 ml          General: Middle aged, NAD, lethargic, alert/awake  Head:  Atraumatic Normocephalic. Eyes:  PERRLA, EOMI, Anicteric. ENT:  No discharges/lesions. Lungs:  CTA Bilaterally.    CVS:  Regular rate and rhythm,  No murmur, rub, or gallop, No JVD, No lower   extremity edema. Abdomen: Soft, with clean surgical incision and staples, bowel sounds sluggish, minimally tender only on deep palpation in RUQ  MSK:  No deformities, lesions, Spontaneously moves extremities. Neurologic:  gcs 15, no motor or sensory deficits  Psychiatry:      Flat affect, AO x3  Skin:   No rash/lesions. Good skin turgor  Heme/Lymph/Immune:  No petechiae, ecchymoses, overt signs of bleeding or    lymphadenopathy noted. Recent Results (from the past 24 hour(s))   GLUCOSE, POC    Collection Time: 10/21/20 11:18 AM   Result Value Ref Range    Glucose (POC) 173 (H) 65 - 100 mg/dL   SARS-COV-2    Collection Time: 10/21/20  5:16 PM   Result Value Ref Range    Specimen source Nasopharyngeal      COVID-19 rapid test Not detected NOTD      SARS CoV-2 PENDING    GLUCOSE, POC    Collection Time: 10/21/20  6:32 PM   Result Value Ref Range    Glucose (POC) 168 (H) 65 - 100 mg/dL   GLUCOSE, POC    Collection Time: 10/21/20 11:49 PM   Result Value Ref Range    Glucose (POC) 125 (H) 65 - 100 mg/dL   VANCOMYCIN, RANDOM    Collection Time: 10/22/20  1:20 AM   Result Value Ref Range    Vancomycin, random 14.6 UG/ML   CBC WITH AUTOMATED DIFF    Collection Time: 10/22/20  1:20 AM   Result Value Ref Range    WBC 7.9 4.3 - 11.1 K/uL    RBC 3.10 (L) 4.23 - 5.6 M/uL    HGB 8.1 (L) 13.6 - 17.2 g/dL    HCT 24.6 (L) 41.1 - 50.3 %    MCV 79.4 (L) 79.6 - 97.8 FL    MCH 26.1 26.1 - 32.9 PG    MCHC 32.9 31.4 - 35.0 g/dL    RDW 15.9 (H) 11.9 - 14.6 %    PLATELET 779 081 - 718 K/uL    MPV 11.9 9.4 - 12.3 FL    ABSOLUTE NRBC 0.02 0.0 - 0.2 K/uL    DF AUTOMATED      NEUTROPHILS 77 43 - 78 %    LYMPHOCYTES 14 13 - 44 %    MONOCYTES 6 4.0 - 12.0 %    EOSINOPHILS 2 0.5 - 7.8 %    BASOPHILS 0 0.0 - 2.0 %    IMMATURE GRANULOCYTES 1 0.0 - 5.0 %    ABS. NEUTROPHILS 6.1 1.7 - 8.2 K/UL    ABS. LYMPHOCYTES 1.1 0.5 - 4.6 K/UL    ABS. MONOCYTES 0.5 0.1 - 1.3 K/UL    ABS. EOSINOPHILS 0.1 0.0 - 0.8 K/UL    ABS. BASOPHILS 0.0 0.0 - 0.2 K/UL    ABS. IMM. GRANS. 0.1 0.0 - 0.5 K/UL   METABOLIC PANEL, BASIC    Collection Time: 10/22/20  1:20 AM   Result Value Ref Range    Sodium 146 (H) 136 - 145 mmol/L    Potassium 3.6 3.5 - 5.1 mmol/L    Chloride 114 (H) 98 - 107 mmol/L    CO2 25 21 - 32 mmol/L    Anion gap 7 7 - 16 mmol/L    Glucose 105 (H) 65 - 100 mg/dL    BUN 20 6 - 23 MG/DL    Creatinine 1.21 0.8 - 1.5 MG/DL    GFR est AA >60 >60 ml/min/1.73m2    GFR est non-AA >60 >60 ml/min/1.73m2    Calcium 8.4 8.3 - 10.4 MG/DL   GLUCOSE, POC    Collection Time: 10/22/20  6:16 AM   Result Value Ref Range    Glucose (POC) 106 (H) 65 - 100 mg/dL         Imaging /Procedures /Studies   All diagnostic imaging personally reviewed by me. CTAP:  IMPRESSION:      Findings worrisome for acute calculus cholecystitis.     Possible subhepatic abscess.     Small amount of ascites in the right anterior subphrenic space.     Right lower lobe pneumonia.     Status post ASD closure.       Evaluation of the abdominal viscera is limited without intravenous contrast.    EXAM:  US RETROPERITONEUM COMP     INDICATION:  LOKI     COMPARISON: None.     TECHNIQUE:  Real-time sonography of the kidneys, retroperitoneum and bladder was performed  with multiple static images obtained.     FINDINGS:  The kidneys have increased echogenicity with no mass, stone or hydronephrosis. The right kidney measures 12.1 cm and the left kidney measures 11.1 cm in  length.     The aorta tapers normally. The proximal iliac arteries are normal.  The IVC is  normal. No retroperitoneal mass is identified.     The urinary bladder is normal. Abdominal ascites noted.     IMPRESSION  IMPRESSION:   1. Increased renal echogenicity. Findings compatible with chronic medical renal  disease.   2. Abdominal ascites       ASSESSMENT      Hospital Problems as of 10/22/2020 Date Reviewed: 3/3/2017          Codes Class Noted - Resolved POA    Hypertensive urgency ICD-10-CM: I16.0  ICD-9-CM: 401.9  10/20/2020 - Present Unknown History of CVA (cerebrovascular accident) ICD-10-CM: Z86.73  ICD-9-CM: V12.54  10/19/2020 - Present Unknown        * (Principal) Abscess of abdominal cavity (HCC) ICD-10-CM: K65.1  ICD-9-CM: 567.22  10/19/2020 - Present Unknown        RLL pneumonia ICD-10-CM: J18.9  ICD-9-CM: 486  10/19/2020 - Present Unknown        Left hemiparesis ([facility] ICD-10-CM: G81.94  ICD-9-CM: 342.90  10/19/2020 - Present Unknown        Sepsis ([facility] ICD-10-CM: A41.9  ICD-9-CM: 038.9, 995.91  10/19/2020 - Present Unknown        Sinus tachycardia ICD-10-CM: R00.0  ICD-9-CM: 427.89  10/19/2020 - Present Unknown        LOKI (acute kidney injury) ([facility] ICD-10-CM: N17.9  ICD-9-CM: 584.9  10/19/2020 - Present Unknown        Hypertension (Chronic) ICD-10-CM: I10  ICD-9-CM: 401.9  Unknown - Present Yes        Type 2 diabetes mellitus (HCC) (Chronic) ICD-10-CM: E11.9  ICD-9-CM: 250.00  Unknown - Present                   Plan:  - pt is s/p open lap eve done on 10/19/20  Pt is on GI soft diet. Tolerating it well  Pain control is suboptimal, continue prn narcotics IV an oral  Post op care and resumption of oral diet deferred to General Surgery  Stopped vancomycin, continue cefepime for another 24-48 hours. Blood cultures stays negative  Continue azithromycin for atypical coverage in view of RLL infiltrates. Continue hydralazine 25 mg po TID and labetalol 100 mg po bid for hypertension. Sinus tachycardia from underlying sepsis and pain. HR is better controlled today  Continue other meds as reconciled in [facility]. Acute renal failure is improving, Cr down from 2.39 --> 1.35 --> 1.21    IV fluids stopped    PT/OT once medically cleared    Plan to resume plavix from tomorrow, cleared from surgery standpoint.   Starting ASA 81 mg today    GI soft diet    DVT Prophylaxis: SCD's  High risk with opioids on board  Dispo: pending until medically cleared    Kashif Florence MD

## 2020-10-22 NOTE — PROGRESS NOTES
SPEECH PATHOLOGY NOTE:    Attempted to see patient for diet tolerance, however politely declined due to complaints of 7/10 abdominal pain. Reports RN recently provided meds. Will re attempt at later time/date when patient agreeable.         Rhonda Mabry, [facility], [facility], CCC-SLP

## 2020-10-22 NOTE — PROGRESS NOTES
Pharmacokinetic Consult to Pharmacist    Gino Keita. is a 50 y.o. male being treated  with vancomycin. Height: 5' 6\" (167.6 cm)  Weight: 72.6 kg (160 lb)  Lab Results   Component Value Date/Time    BUN 20 10/22/2020 01:20 AM    Creatinine 1.21 10/22/2020 01:20 AM    WBC 7.9 10/22/2020 01:20 AM    Procalcitonin 114.66 10/18/2020 08:19 PM    Lactic acid 1.6 10/18/2020 08:19 PM      Estimated Creatinine Clearance: 68.1 mL/min (based on SCr of 1.21 mg/dL). Cultures:  BCX: NG      Day 5 of vancomycin. Goal trough is 15-20. Random level of 14.6 (10/22 @0120). Recommend to initiate vancomycin 1250mg Q24H. Patient has demonstrated improved renal function with a CrCl above 60mL/min for the previous 2 doses. Next dose would be 10/23 @ 0400. Recommend collecting trough level 10/24 @ 0300 (after 3 doses with improved renal function). Will continue to follow patient and order levels when clinically indicated.     Thank you,  Sylvia Kidd   PharmD Candidate  [facility]

## 2020-10-22 NOTE — PROGRESS NOTES
Physical Therapy Note:    Third day/attempt at initial physical therapy evaluation. Patient politely declines despite maximal encouragement and education. States pain is controlled but he does not want to participate. Will re-attempt an additional time tomorrow. Patient would greatly benefit from out of bed mobility. Primary RN updated.  Thank you,    Mellissa Fairchild, PT, DPT  10/22/2020

## 2020-10-23 ENCOUNTER — APPOINTMENT (OUTPATIENT)
Dept: ULTRASOUND IMAGING | Age: 45
DRG: 710 | End: 2020-10-23
Attending: HOSPITALIST
Payer: MEDICAID

## 2020-10-23 PROBLEM — D62 ACUTE BLOOD LOSS AS CAUSE OF POSTOPERATIVE ANEMIA: Status: ACTIVE | Noted: 2020-10-23

## 2020-10-23 LAB
ANION GAP SERPL CALC-SCNC: 6 MMOL/L (ref 7–16)
BUN SERPL-MCNC: 13 MG/DL (ref 6–23)
CALCIUM SERPL-MCNC: 8.6 MG/DL (ref 8.3–10.4)
CHLORIDE SERPL-SCNC: 115 MMOL/L (ref 98–107)
CO2 SERPL-SCNC: 25 MMOL/L (ref 21–32)
COVID-19 RAPID TEST, COVR: NOT DETECTED
CREAT SERPL-MCNC: 1.03 MG/DL (ref 0.8–1.5)
ERYTHROCYTE [DISTWIDTH] IN BLOOD BY AUTOMATED COUNT: 15.9 % (ref 11.9–14.6)
GLUCOSE BLD STRIP.AUTO-MCNC: 100 MG/DL (ref 65–100)
GLUCOSE BLD STRIP.AUTO-MCNC: 112 MG/DL (ref 65–100)
GLUCOSE BLD STRIP.AUTO-MCNC: 114 MG/DL (ref 65–100)
GLUCOSE BLD STRIP.AUTO-MCNC: 94 MG/DL (ref 65–100)
GLUCOSE SERPL-MCNC: 82 MG/DL (ref 65–100)
HCT VFR BLD AUTO: 22.3 % (ref 41.1–50.3)
HGB BLD-MCNC: 7.1 G/DL (ref 13.6–17.2)
HISTORY CHECKED?,CKHIST: NORMAL
MCH RBC QN AUTO: 25.9 PG (ref 26.1–32.9)
MCHC RBC AUTO-ENTMCNC: 31.8 G/DL (ref 31.4–35)
MCV RBC AUTO: 81.4 FL (ref 79.6–97.8)
NRBC # BLD: 0 K/UL (ref 0–0.2)
PLATELET # BLD AUTO: 238 K/UL (ref 150–450)
PMV BLD AUTO: 11.7 FL (ref 9.4–12.3)
POTASSIUM SERPL-SCNC: 3.4 MMOL/L (ref 3.5–5.1)
RBC # BLD AUTO: 2.74 M/UL (ref 4.23–5.6)
SARS COV-2, XPGCVT: NEGATIVE
SODIUM SERPL-SCNC: 146 MMOL/L (ref 136–145)
SOURCE, COVRS: NORMAL
WBC # BLD AUTO: 10.2 K/UL (ref 4.3–11.1)

## 2020-10-23 PROCEDURE — 76705 ECHO EXAM OF ABDOMEN: CPT

## 2020-10-23 PROCEDURE — 36415 COLL VENOUS BLD VENIPUNCTURE: CPT

## 2020-10-23 PROCEDURE — 74011250637 HC RX REV CODE- 250/637: Performed by: HOSPITALIST

## 2020-10-23 PROCEDURE — 74011250636 HC RX REV CODE- 250/636: Performed by: HOSPITALIST

## 2020-10-23 PROCEDURE — 82962 GLUCOSE BLOOD TEST: CPT

## 2020-10-23 PROCEDURE — 2709999900 HC NON-CHARGEABLE SUPPLY

## 2020-10-23 PROCEDURE — P9016 RBC LEUKOCYTES REDUCED: HCPCS

## 2020-10-23 PROCEDURE — 36430 TRANSFUSION BLD/BLD COMPNT: CPT

## 2020-10-23 PROCEDURE — 86923 COMPATIBILITY TEST ELECTRIC: CPT

## 2020-10-23 PROCEDURE — 74011250637 HC RX REV CODE- 250/637: Performed by: SURGERY

## 2020-10-23 PROCEDURE — 80048 BASIC METABOLIC PNL TOTAL CA: CPT

## 2020-10-23 PROCEDURE — 65270000029 HC RM PRIVATE

## 2020-10-23 PROCEDURE — 86850 RBC ANTIBODY SCREEN: CPT

## 2020-10-23 PROCEDURE — 74011250637 HC RX REV CODE- 250/637: Performed by: NURSE PRACTITIONER

## 2020-10-23 PROCEDURE — 30233N1 TRANSFUSION OF NONAUTOLOGOUS RED BLOOD CELLS INTO PERIPHERAL VEIN, PERCUTANEOUS APPROACH: ICD-10-PCS | Performed by: HOSPITALIST

## 2020-10-23 PROCEDURE — 74011000258 HC RX REV CODE- 258: Performed by: HOSPITALIST

## 2020-10-23 PROCEDURE — 85027 COMPLETE CBC AUTOMATED: CPT

## 2020-10-23 RX ORDER — SODIUM CHLORIDE 9 MG/ML
250 INJECTION, SOLUTION INTRAVENOUS AS NEEDED
Status: DISCONTINUED | OUTPATIENT
Start: 2020-10-23 | End: 2020-10-24 | Stop reason: HOSPADM

## 2020-10-23 RX ADMIN — CEFEPIME HYDROCHLORIDE 1 G: 1 INJECTION, POWDER, FOR SOLUTION INTRAMUSCULAR; INTRAVENOUS at 07:54

## 2020-10-23 RX ADMIN — HYDRALAZINE HYDROCHLORIDE 50 MG: 50 TABLET, FILM COATED ORAL at 23:14

## 2020-10-23 RX ADMIN — HYDRALAZINE HYDROCHLORIDE 50 MG: 50 TABLET, FILM COATED ORAL at 15:08

## 2020-10-23 RX ADMIN — Medication 10 ML: at 05:14

## 2020-10-23 RX ADMIN — LABETALOL HYDROCHLORIDE 100 MG: 100 TABLET, FILM COATED ORAL at 07:53

## 2020-10-23 RX ADMIN — Medication 10 ML: at 11:37

## 2020-10-23 RX ADMIN — ASPIRIN 81 MG: 81 TABLET, CHEWABLE ORAL at 07:53

## 2020-10-23 RX ADMIN — HYDROCODONE BITARTRATE AND ACETAMINOPHEN 1 TABLET: 7.5; 325 TABLET ORAL at 16:55

## 2020-10-23 RX ADMIN — CEFEPIME HYDROCHLORIDE 1 G: 1 INJECTION, POWDER, FOR SOLUTION INTRAMUSCULAR; INTRAVENOUS at 23:17

## 2020-10-23 RX ADMIN — ATORVASTATIN CALCIUM 80 MG: 80 TABLET, FILM COATED ORAL at 23:14

## 2020-10-23 RX ADMIN — CLOPIDOGREL BISULFATE 75 MG: 75 TABLET ORAL at 07:54

## 2020-10-23 RX ADMIN — HYDROCODONE BITARTRATE AND ACETAMINOPHEN 1 TABLET: 7.5; 325 TABLET ORAL at 23:26

## 2020-10-23 RX ADMIN — HYDROCODONE BITARTRATE AND ACETAMINOPHEN 1 TABLET: 7.5; 325 TABLET ORAL at 06:27

## 2020-10-23 RX ADMIN — HYDRALAZINE HYDROCHLORIDE 50 MG: 50 TABLET, FILM COATED ORAL at 07:53

## 2020-10-23 RX ADMIN — Medication 10 ML: at 23:17

## 2020-10-23 RX ADMIN — LABETALOL HYDROCHLORIDE 100 MG: 100 TABLET, FILM COATED ORAL at 15:08

## 2020-10-23 NOTE — PROGRESS NOTES
Physical Therapy Note:    Attempted to see patient this PM for physical therapy treatment session. Patient currently with primary RN at bedside attempting to get PRBC started.  Patient agreeable and motivated to mobilize; will check back this PM. Thank you,    Chris Givens, PT, DPT  10/23/2020

## 2020-10-23 NOTE — PROGRESS NOTES
SPEECH PATHOLOGY NOTE:    Attempted to see patient, however off floor. Will check back at later time/date. Addendum: second attempt, patient declined despite encouragement. Reports already ate lunch (minimal amount consumed). Will re attempt at later time/date.        Layla [address] CCC-SLP

## 2020-10-23 NOTE — PROGRESS NOTES
Physical Therapy Note:    Chart reviewed and initial evaluation attempted. Patent currently going off the floor for a test. Will attempt later as patient is available and schedule permits.     Thank you,  Henrry Lang, PT, DPT

## 2020-10-23 NOTE — PROGRESS NOTES
Hospitalist Progress Note    10/23/2020  Admit Date: 10/18/2020  7:55 PM   NAME: Julius Tapia. :  1975   MRN:  [id number]   Attending: Magnolia Veliz MD  PCP:  Adam Romero MD    SUBJECTIVE:     Julius Tapia. is a 50years old male with hx of CVA with left residual weakness, HTN, PFO s/p closure, DM-2 admitted on 10/19 for sepsis secondary to acute cholecystitis. Pt underwent open cholecystectomy and drainage of perihepatic abscess. Pt also had RLL infiltrate on CXR. He is currently on broad spectrum abx with atypical coverage with azithromycin. Pt has been tachycardic since admission, initial EKG with sinus tachy due to underlying sepsis. Also had UTI and Acute renal failure with Cr 6.18.    10/23:  Pt reports feeling okay, reports optimal pain control. Denies nausea/vomiting, fever, chills, night sweats. No overnight events. Review of Systems negative with exception of pertinent positives noted above      PHYSICAL EXAM       Visit Vitals  BP (!) 145/77 (BP 1 Location: Left arm, BP Patient Position: At rest)   Pulse 93   Temp 98 °F (36.7 °C)   Resp 18   Ht 5' 6\" (1.676 m)   Wt 72.6 kg (160 lb)   SpO2 97%   BMI 25.82 kg/m²      Temp (24hrs), Av.1 °F (37.3 °C), Min:97.8 °F (36.6 °C), Max:100.3 °F (37.9 °C)    Oxygen Therapy  O2 Sat (%): 97 % (10/23/20 0407)  Pulse via Oximetry: 102 beats per minute (10/19/20 1817)  O2 Device: Nasal cannula (10/21/20 0515)  O2 Flow Rate (L/min): 2 l/min (10/21/20 0515)    Intake/Output Summary (Last 24 hours) at 10/23/2020 0747  Last data filed at 10/23/2020 0626  Gross per 24 hour   Intake    Output 1150 ml   Net -1150 ml          General: Middle aged, NAD, lethargic, alert/awake  Head:  Atraumatic Normocephalic. Eyes:  PERRLA, EOMI, Anicteric. ENT:  No discharges/lesions. Lungs:  CTA Bilaterally. CVS:  Regular rate and rhythm,  No murmur, rub, or gallop, No JVD, No lower   extremity edema.   Abdomen: Soft, with clean surgical incision and staples, bowel sounds sluggish, minimally tender only on deep palpation in RUQ  MSK:  No deformities, lesions, Spontaneously moves extremities. Neurologic:  gcs 15, no motor or sensory deficits  Psychiatry:      Flat affect, AO x3  Skin:   No rash/lesions. Good skin turgor  Heme/Lymph/Immune:  No petechiae, ecchymoses, overt signs of bleeding or    lymphadenopathy noted. Recent Results (from the past 24 hour(s))   GLUCOSE, POC    Collection Time: 10/22/20 11:44 AM   Result Value Ref Range    Glucose (POC) 113 (H) 65 - 100 mg/dL   GLUCOSE, POC    Collection Time: 10/22/20  4:22 PM   Result Value Ref Range    Glucose (POC) 109 (H) 65 - 100 mg/dL   GLUCOSE, POC    Collection Time: 10/23/20 12:03 AM   Result Value Ref Range    Glucose (POC) 100 65 - 100 mg/dL   CBC W/O DIFF    Collection Time: 10/23/20  4:49 AM   Result Value Ref Range    WBC 10.2 4.3 - 11.1 K/uL    RBC 2.74 (L) 4.23 - 5.6 M/uL    HGB 7.1 (L) 13.6 - 17.2 g/dL    HCT 22.3 (L) 41.1 - 50.3 %    MCV 81.4 79.6 - 97.8 FL    MCH 25.9 (L) 26.1 - 32.9 PG    MCHC 31.8 31.4 - 35.0 g/dL    RDW 15.9 (H) 11.9 - 14.6 %    PLATELET 423 314 - 340 K/uL    MPV 11.7 9.4 - 12.3 FL    ABSOLUTE NRBC 0.00 0.0 - 0.2 K/uL   METABOLIC PANEL, BASIC    Collection Time: 10/23/20  4:49 AM   Result Value Ref Range    Sodium 146 (H) 136 - 145 mmol/L    Potassium 3.4 (L) 3.5 - 5.1 mmol/L    Chloride 115 (H) 98 - 107 mmol/L    CO2 25 21 - 32 mmol/L    Anion gap 6 (L) 7 - 16 mmol/L    Glucose 82 65 - 100 mg/dL    BUN 13 6 - 23 MG/DL    Creatinine 1.03 0.8 - 1.5 MG/DL    GFR est AA >60 >60 ml/min/1.73m2    GFR est non-AA >60 >60 ml/min/1.73m2    Calcium 8.6 8.3 - 10.4 MG/DL   GLUCOSE, POC    Collection Time: 10/23/20  6:25 AM   Result Value Ref Range    Glucose (POC) 94 65 - 100 mg/dL         Imaging /Procedures /Studies   All diagnostic imaging personally reviewed by me.     CTAP:  IMPRESSION:      Findings worrisome for acute calculus cholecystitis.     Possible subhepatic abscess.     Small amount of ascites in the right anterior subphrenic space.     Right lower lobe pneumonia.     Status post ASD closure.       Evaluation of the abdominal viscera is limited without intravenous contrast.    EXAM:  US RETROPERITONEUM COMP     INDICATION:  LOKI     COMPARISON: None.     TECHNIQUE:  Real-time sonography of the kidneys, retroperitoneum and bladder was performed  with multiple static images obtained.     FINDINGS:  The kidneys have increased echogenicity with no mass, stone or hydronephrosis. The right kidney measures 12.1 cm and the left kidney measures 11.1 cm in  length.     The aorta tapers normally. The proximal iliac arteries are normal.  The IVC is  normal. No retroperitoneal mass is identified.     The urinary bladder is normal. Abdominal ascites noted.     IMPRESSION  IMPRESSION:   1. Increased renal echogenicity. Findings compatible with chronic medical renal  disease.   2. Abdominal ascites       ASSESSMENT      Hospital Problems as of 10/23/2020 Date Reviewed: 3/3/2017          Codes Class Noted - Resolved POA    Acute blood loss as cause of postoperative anemia ICD-10-CM: D62  ICD-9-CM: 285.1  10/23/2020 - Present Unknown        Hypertensive urgency ICD-10-CM: I16.0  ICD-9-CM: 401.9  10/20/2020 - Present Unknown        History of CVA (cerebrovascular accident) ICD-10-CM: Z86.73  ICD-9-CM: V12.54  10/19/2020 - Present Unknown        * (Principal) Abscess of abdominal cavity (HCC) ICD-10-CM: K65.1  ICD-9-CM: 567.22  10/19/2020 - Present Unknown        RLL pneumonia ICD-10-CM: J18.9  ICD-9-CM: 559  10/19/2020 - Present Unknown        Left hemiparesis ([facility] ICD-10-CM: G81.94  ICD-9-CM: 342.90  10/19/2020 - Present Unknown        Sepsis ([facility] ICD-10-CM: A41.9  ICD-9-CM: 038.9, 995.91  10/19/2020 - Present Unknown        Sinus tachycardia ICD-10-CM: R00.0  ICD-9-CM: 427.89  10/19/2020 - Present Unknown        LOKI (acute kidney injury) ([facility] ICD-10-CM: N17.9  ICD-9-CM: 584.9 10/19/2020 - Present Unknown        Hypertension (Chronic) ICD-10-CM: I10  ICD-9-CM: 401.9  Unknown - Present Yes        Type 2 diabetes mellitus (HCC) (Chronic) ICD-10-CM: E11.9  ICD-9-CM: 250.00  Unknown - Present                   Plan:    - f/u with ultrasound of RUQ to rule out post op hematoma or fluid collection  Plan to transfuse 1 unit PRBC as Hb dropped to 7.1 from 8.1 (baseline > 10)    - pt is s/p open lap eve done on 10/19/20  Pt is on GI soft diet. Tolerating it well  Pain control is optimal, continue prn narcotics IV an oral    Stopped vancomycin, continue cefepime for another 24-48 hours. Blood cultures stays negative  Continue azithromycin for atypical coverage in view of RLL infiltrates. Continue hydralazine 25 mg po TID and labetalol 100 mg po bid for hypertension. Sinus tachycardia from underlying sepsis and pain. HR is better controlled today  Continue other meds as reconciled in [facility]     Acute renal failure is improving, Cr down from 2.39 --> 1.35 --> 1.21 --> 1.03    IV fluids stopped    PT/OT once medically cleared    Restarting plavix and asa    GI soft diet    DVT Prophylaxis: SCD's  High risk with opioids on board  Dispo: pending until medically cleared    Crys Giraldo MD

## 2020-10-24 VITALS
BODY MASS INDEX: 26.92 KG/M2 | SYSTOLIC BLOOD PRESSURE: 166 MMHG | DIASTOLIC BLOOD PRESSURE: 86 MMHG | RESPIRATION RATE: 19 BRPM | WEIGHT: 167.5 LBS | OXYGEN SATURATION: 97 % | HEIGHT: 66 IN | HEART RATE: 74 BPM | TEMPERATURE: 99.1 F

## 2020-10-24 LAB
ABO + RH BLD: NORMAL
ANION GAP SERPL CALC-SCNC: 6 MMOL/L (ref 7–16)
BACTERIA SPEC CULT: NORMAL
BLD PROD TYP BPU: NORMAL
BLOOD GROUP ANTIBODIES SERPL: NORMAL
BPU ID: NORMAL
BUN SERPL-MCNC: 11 MG/DL (ref 6–23)
CALCIUM SERPL-MCNC: 8.5 MG/DL (ref 8.3–10.4)
CHLORIDE SERPL-SCNC: 114 MMOL/L (ref 98–107)
CO2 SERPL-SCNC: 25 MMOL/L (ref 21–32)
CREAT SERPL-MCNC: 0.93 MG/DL (ref 0.8–1.5)
CROSSMATCH RESULT,%XM: NORMAL
ERYTHROCYTE [DISTWIDTH] IN BLOOD BY AUTOMATED COUNT: 15.4 % (ref 11.9–14.6)
GLUCOSE BLD STRIP.AUTO-MCNC: 109 MG/DL (ref 65–100)
GLUCOSE BLD STRIP.AUTO-MCNC: 115 MG/DL (ref 65–100)
GLUCOSE BLD STRIP.AUTO-MCNC: 173 MG/DL (ref 65–100)
GLUCOSE SERPL-MCNC: 100 MG/DL (ref 65–100)
HCT VFR BLD AUTO: 25.1 % (ref 41.1–50.3)
HGB BLD-MCNC: 8.3 G/DL (ref 13.6–17.2)
Lab: NORMAL
MCH RBC QN AUTO: 26.8 PG (ref 26.1–32.9)
MCHC RBC AUTO-ENTMCNC: 33.1 G/DL (ref 31.4–35)
MCV RBC AUTO: 81 FL (ref 79.6–97.8)
NRBC # BLD: 0 K/UL (ref 0–0.2)
PLATELET # BLD AUTO: 260 K/UL (ref 150–450)
PMV BLD AUTO: 11.2 FL (ref 9.4–12.3)
POTASSIUM SERPL-SCNC: 3.4 MMOL/L (ref 3.5–5.1)
RBC # BLD AUTO: 3.1 M/UL (ref 4.23–5.6)
REFERENCE LAB,REFLB: NORMAL
SERVICE CMNT-IMP: NORMAL
SODIUM SERPL-SCNC: 145 MMOL/L (ref 136–145)
SPECIMEN EXP DATE BLD: NORMAL
STATUS OF UNIT,%ST: NORMAL
TEST DESCRIPTION:,ATST: NORMAL
UNIT DIVISION, %UDIV: 0
WBC # BLD AUTO: 11.6 K/UL (ref 4.3–11.1)

## 2020-10-24 PROCEDURE — 74011000258 HC RX REV CODE- 258: Performed by: HOSPITALIST

## 2020-10-24 PROCEDURE — 82962 GLUCOSE BLOOD TEST: CPT

## 2020-10-24 PROCEDURE — 74011636637 HC RX REV CODE- 636/637: Performed by: SURGERY

## 2020-10-24 PROCEDURE — 36415 COLL VENOUS BLD VENIPUNCTURE: CPT

## 2020-10-24 PROCEDURE — 74011250637 HC RX REV CODE- 250/637: Performed by: NURSE PRACTITIONER

## 2020-10-24 PROCEDURE — 85027 COMPLETE CBC AUTOMATED: CPT

## 2020-10-24 PROCEDURE — 74011250637 HC RX REV CODE- 250/637: Performed by: HOSPITALIST

## 2020-10-24 PROCEDURE — 74011250636 HC RX REV CODE- 250/636: Performed by: HOSPITALIST

## 2020-10-24 PROCEDURE — 80048 BASIC METABOLIC PNL TOTAL CA: CPT

## 2020-10-24 RX ORDER — POLYETHYLENE GLYCOL 3350 17 G/17G
17 POWDER, FOR SOLUTION ORAL DAILY
Qty: 10 PACKET | Refills: 1 | Status: SHIPPED | OUTPATIENT
Start: 2020-10-24 | End: 2020-11-03

## 2020-10-24 RX ORDER — HYDROCODONE BITARTRATE AND ACETAMINOPHEN 7.5; 325 MG/1; MG/1
1 TABLET ORAL
Qty: 20 TAB | Refills: 0 | Status: SHIPPED | OUTPATIENT
Start: 2020-10-24 | End: 2020-10-29

## 2020-10-24 RX ORDER — HYDRALAZINE HYDROCHLORIDE 50 MG/1
50 TABLET, FILM COATED ORAL 3 TIMES DAILY
Qty: 90 TAB | Refills: 3 | Status: SHIPPED | OUTPATIENT
Start: 2020-10-24 | End: 2020-11-23

## 2020-10-24 RX ADMIN — HYDROCODONE BITARTRATE AND ACETAMINOPHEN 1 TABLET: 7.5; 325 TABLET ORAL at 14:14

## 2020-10-24 RX ADMIN — Medication 10 ML: at 07:27

## 2020-10-24 RX ADMIN — ASPIRIN 81 MG: 81 TABLET, CHEWABLE ORAL at 07:57

## 2020-10-24 RX ADMIN — HYDROCODONE BITARTRATE AND ACETAMINOPHEN 1 TABLET: 7.5; 325 TABLET ORAL at 07:57

## 2020-10-24 RX ADMIN — INSULIN LISPRO 1 UNITS: 100 INJECTION, SOLUTION INTRAVENOUS; SUBCUTANEOUS at 12:00

## 2020-10-24 RX ADMIN — CLOPIDOGREL BISULFATE 75 MG: 75 TABLET ORAL at 07:57

## 2020-10-24 RX ADMIN — CEFEPIME HYDROCHLORIDE 1 G: 1 INJECTION, POWDER, FOR SOLUTION INTRAMUSCULAR; INTRAVENOUS at 07:59

## 2020-10-24 RX ADMIN — HYDRALAZINE HYDROCHLORIDE 50 MG: 50 TABLET, FILM COATED ORAL at 07:57

## 2020-10-24 RX ADMIN — LABETALOL HYDROCHLORIDE 100 MG: 100 TABLET, FILM COATED ORAL at 07:57

## 2020-10-24 NOTE — PROGRESS NOTES
Norco 7.5 mg po given. 10/23/20 3676   Pain 1   Pain Scale 1 Numeric (0 - 10)   Pain Intensity 1 9   Patient Stated Pain Goal 0   Pain Onset 1 post op   Pain Location 1 Abdomen   Pain Orientation 1 Anterior; Lateral   Pain Description 1 Aching   Pain Intervention(s) 1 Medication (see MAR)

## 2020-10-24 NOTE — PROGRESS NOTES
TRANSFER - OUT REPORT:    Verbal report given to Ulices on Nadine Dockoscar.  being transferred to [facility] room [phone] for routine progression of care       Report consisted of patients Situation, Background, Assessment and   Recommendations(SBAR). Information from the following report(s) SBAR was reviewed with the receiving nurse. Opportunity for questions and clarification was provided.

## 2020-10-24 NOTE — PROGRESS NOTES
MSN, CM:  Patient to be discharged back to [facility]. Patient has met all milestones for this admission. [facility] to transport patient to facility.     Care Management Interventions  PCP Verified by CM: Yes(SNF MD)  Mode of Transport at Discharge: BLS([facility])  Transition of Care Consult (CM Consult): SNF(no consult received)  Partner SNF: Yes  Discharge Durable Medical Equipment: No  Physical Therapy Consult: No  Occupational Therapy Consult: No  Speech Therapy Consult: Yes  Current Support Network: [address] [place] [address], Family Lives Nearby  Confirm Follow Up Transport: Other (see comment)(facility transport)  The Plan for Transition of Care is Related to the Following Treatment Goals : Return to SNF to resume LTC  The Patient and/or Patient Representative was Provided with a Choice of Provider and Agrees with the Discharge Plan?: Yes  Freedom of Choice List was Provided with Basic Dialogue that Supports the Patient's Individualized Plan of Care/Goals, Treatment Preferences and Shares the Quality Data Associated with the Providers?: Yes  Discharge Location  Discharge Placement: Skilled nursing facility

## 2020-10-24 NOTE — PROGRESS NOTES
Received bedside shift report from Shaun Kincaid, [facility]. Pt lying in bed. No apparent distress. Respirations even and unlabored. Instructed to call for assistance with needs, as they arise. Pt voiced understanding.

## 2020-10-24 NOTE — DISCHARGE SUMMARY
Hospitalist Discharge Summary     Patient ID:  Linda Leal  [id number]  11 y.o.  1975  Admit date: 10/18/2020  7:55 PM  Discharge date and time: 10/24/2020  Attending: Milka Knowles MD  PCP:  Dread Campbell MD  Treatment Team: Attending Provider: Milka Knowles MD; Consulting Provider: Kavin Montelongo MD; Consulting Provider: Benny Putnam MD; Care Manager: Ricardo Cobb [facility]; Utilization Review: Ricardo Eaton RN; Utilization Review: Den Rosas, RN; Physical Therapist: Elkin Frederick PT    Principal Diagnosis Abscess of abdominal cavity [facility])   Principal Problem:    Abscess of abdominal cavity ([facility] [address] (10/19/2020)    Active Problems:    Hypertension ()      History of CVA (cerebrovascular accident) (10/19/2020)      RLL pneumonia (10/19/2020)      Left hemiparesis ([address] (10/19/2020)      Sepsis ([address] (10/19/2020)      Sinus tachycardia (10/19/2020)      LOKI (acute kidney injury) ([facility] [address] (10/19/2020)      Hypertensive urgency (10/20/2020)      Acute blood loss as cause of postoperative anemia (10/23/2020)             Hospital Course:  Please refer to the admission H&P for details of presentation. In summary, the patient is a 50years old male with hx of CVA with left residual weakness, HTN, PFO s/p closure, DM-2 admitted on 10/19 for sepsis secondary to acute cholecystitis. Pt underwent open cholecystectomy and drainage of perihepatic abscess. Pt also had RLL infiltrate on CXR. He is currently on broad spectrum abx with atypical coverage with azithromycin. Pt has been tachycardic since admission, initial EKG with sinus tachy due to underlying sepsis. Also had UTI and Acute renal failure with Cr 6.18. Pt underwent open lap eve on 10/19/20. Post operatively pt was doing well, but developed acute blood loss anemia for which he required 1 unit prbc with post transfusion Hb of 8.3  Post op US RUQ was unremarkable. Pt has completed IV cefepime for 5 days.  Blood and urine culture has stayed negative to date. Pt developed post op fever, which resolved spontaneously. Pt's renal function has returned to normal with latest creatinine of 0.93. Pt is doing well overall, is hemodynamically stable. He will be discharged back to [facility] today. Rest of the hospital course was uneventful. For further details, please refer to daily progress notes. Significant Diagnostic Studies:   CT ABDOMEN AND PELVIS WITHOUT CONTRAST     HISTORY: Abdominal pain and sepsis       FINDINGS:   *  LUNG BASES: Right lower lobe airspace disease. Status post ASD closure. *  LIVER: Within normal limits. *  GALLBLADDER AND BILE DUCTS: Distended gallbladder containing gallstones with  adjacent inflammation. *  SPLEEN: Within normal limits. *  URINARY TRACT: Within normal limits. *  ADRENALS: Within normal limits. *  PANCREAS: Within normal limits. *  GASTROINTESTINAL TRACT: Within normal limits. *  RETROPERITONEUM: Within normal limits. *  PERITONEAL CAVITY AND ABDOMINAL WALL: Subhepatic fluid collection measuring  6.1 x 7.0 x 5.0 cm. Has Hounsfield units equal to 24. *  PELVIS: Small amount of anterior right subphrenic ascites. *  SPINE / BONES: Within normal limits. *  OTHER COMMENTS: None.     IMPRESSION  IMPRESSION:      Findings worrisome for acute calculus cholecystitis.     Possible subhepatic abscess. RIGHT UPPER QUADRANT ULTRASOUND.     HISTORY: Abdominal pain, status post cholecystectomy.      COMPARISON: 18 October 2020     FINDINGS:   Gallbladder is absent. Trace fluid in the expected gallbladder fossa location. Common Bile Duct: is not dilated, 2 mm. Intrahepatic Biliary Tree: is not dilated. Liver: Uniform parenchyma  Included portion of the pancreas and right kidney: are unremarkable.   IVC patent.     IMPRESSION  IMPRESSION: Trace fluid in the gallbladder fossa.     Small amount of ascites in the right anterior subphrenic space.     Right lower lobe pneumonia.     Status post ASD closure. EXAM:  US RETROPERITONEUM COMP     INDICATION:  LOKI     COMPARISON: None.     TECHNIQUE:  Real-time sonography of the kidneys, retroperitoneum and bladder was performed  with multiple static images obtained.     FINDINGS:  The kidneys have increased echogenicity with no mass, stone or hydronephrosis. The right kidney measures 12.1 cm and the left kidney measures 11.1 cm in  length.     The aorta tapers normally. The proximal iliac arteries are normal.  The IVC is  normal. No retroperitoneal mass is identified.     The urinary bladder is normal. Abdominal ascites noted.     IMPRESSION  IMPRESSION:   1. Increased renal echogenicity. Findings compatible with chronic medical renal  disease. 2. Abdominal ascites. Labs: Results:       Chemistry Recent Labs     10/24/20  0512 10/23/20  0449 10/22/20  0120    82 105*    146* 146*   K 3.4* 3.4* 3.6   * 115* 114*   CO2 25 25 25   BUN 11 13 20   CREA 0.93 1.03 1.21   CA 8.5 8.6 8.4   AGAP 6* 6* 7      CBC w/Diff Recent Labs     10/24/20  0512 10/23/20  0449 10/22/20  0120   WBC 11.6* 10.2 7.9   RBC 3.10* 2.74* 3.10*   HGB 8.3* 7.1* 8.1*   HCT 25.1* 22.3* 24.6*    238 224   GRANS  --   --  77   LYMPH  --   --  14   EOS  --   --  2      Cardiac Enzymes No results for input(s): CPK, CKND1, JAREN in the last 72 hours. No lab exists for component: CKRMB, TROIP   Coagulation No results for input(s): PTP, INR, APTT, INREXT in the last 72 hours. Lipid Panel Lab Results   Component Value Date/Time    Cholesterol, total 354 (H) 12/13/2019 04:47 AM    HDL Cholesterol 54 12/13/2019 04:47 AM    LDL,Direct 231 (H) 12/13/2019 04:47 AM    LDL, calculated Not calculated due to elevated triglyceride level 12/13/2019 04:47 AM    VLDL, calculated 84 (H) 12/13/2019 04:47 AM    Triglyceride 420 (H) 12/13/2019 04:47 AM    CHOL/HDL Ratio 6.6 12/13/2019 04:47 AM      BNP No results for input(s): BNPP in the last 72 hours.    Liver Enzymes No results for input(s): TP, ALB, TBIL, AP in the last 72 hours. No lab exists for component: SGOT, GPT, DBIL   Thyroid Studies Lab Results   Component Value Date/Time    TSH <0.005 (L) 10/18/2020 09:00 PM            Discharge Exam:  Visit Vitals  BP (!) 161/93 (BP 1 Location: Right arm, BP Patient Position: At rest)   Pulse 78   Temp 99.2 °F (37.3 °C)   Resp 19   Ht 5' 6\" (1.676 m)   Wt 76 kg (167 lb 8 oz)   SpO2 93%   BMI 27.04 kg/m²     General appearance: alert, cooperative, no distress, appears stated age  Lungs: clear to auscultation bilaterally  Heart: regular rate and rhythm, S1, S2 normal, no murmur, click, rub or gallop  Abdomen: soft, non-tender. Bowel sounds normal. No masses,  no organomegaly, surgical scar with staples, clean and dry. Extremities: no cyanosis or edema  Neurologic: Grossly normal    Disposition: [place]  Discharge Condition: stable  Patient Instructions:   Current Discharge Medication List      START taking these medications    Details   hydrALAZINE (APRESOLINE) 50 mg tablet Take 1 Tab by mouth three (3) times daily for 30 days. Qty: 90 Tab, Refills: 3      HYDROcodone-acetaminophen (NORCO) 7.5-325 mg per tablet Take 1 Tab by mouth every six (6) hours as needed for Pain for up to 5 days. Max Daily Amount: 4 Tabs. Qty: 20 Tab, Refills: 0    Associated Diagnoses: Acute cholecystitis      polyethylene glycol (MIRALAX) 17 gram packet Take 1 Packet by mouth daily for 10 days. Qty: 10 Packet, Refills: 1         CONTINUE these medications which have NOT CHANGED    Details   aspirin 81 mg chewable tablet Take 1 Tab by mouth daily. Qty: 30 Tab, Refills: 5      clopidogreL (PLAVIX) 75 mg tab Take 1 Tab by mouth daily. Qty: 30 Tab, Refills: 5      acetaminophen (TYLENOL) 325 mg tablet Take  by mouth every four (4) hours as needed for Pain.      icosapent ethyL (VASCEPA) 1 gram capsule Take 1 Cap by mouth two (2) times daily (with meals).   Qty: 90 Cap, Refills: 3      atorvastatin (LIPITOR) 80 mg tablet Take 1 Tab by mouth nightly. Qty: 30 Tab, Refills: 0      lisinopril (PRINIVIL, ZESTRIL) 40 mg tablet Take 1 Tab by mouth daily.   Qty: 30 Tab, Refills: 3             Activity: PT/OT Eval and Treat  Diet: Regular Diet  Wound Care: as advised    Follow-up  ·   Follow up with General Surgery in 1-2 weeks for staples removal.  · Follow up with PCP in 2-3 weeks  Time spent to discharge patient 35 minutes  Signed:  Mariela Sheets MD  10/24/2020  8:04 AM

## 2022-02-17 PROBLEM — D62 ACUTE BLOOD LOSS AS CAUSE OF POSTOPERATIVE ANEMIA: Status: RESOLVED | Noted: 2020-10-23 | Resolved: 2022-02-17

## 2022-02-17 PROBLEM — N17.9 AKI (ACUTE KIDNEY INJURY) (HCC): Status: RESOLVED | Noted: 2020-10-19 | Resolved: 2022-02-17

## 2022-02-17 PROBLEM — A41.9 SEPSIS (HCC): Status: RESOLVED | Noted: 2020-10-19 | Resolved: 2022-02-17

## 2022-02-17 PROBLEM — I16.0 HYPERTENSIVE URGENCY: Status: RESOLVED | Noted: 2020-10-20 | Resolved: 2022-02-17

## 2022-02-17 PROBLEM — K65.1 ABSCESS OF ABDOMINAL CAVITY (HCC): Status: RESOLVED | Noted: 2020-10-19 | Resolved: 2022-02-17

## 2022-02-17 PROBLEM — J18.9 RLL PNEUMONIA: Status: RESOLVED | Noted: 2020-10-19 | Resolved: 2022-02-17

## 2022-02-17 PROBLEM — I63.9 ACUTE EMBOLIC STROKE (HCC): Status: RESOLVED | Noted: 2019-12-12 | Resolved: 2022-02-17

## 2022-02-17 PROBLEM — I49.9 ARRHYTHMIA: Status: RESOLVED | Noted: 2019-12-15 | Resolved: 2022-02-17

## 2022-03-18 PROBLEM — Q21.12 PFO (PATENT FORAMEN OVALE): Status: ACTIVE | Noted: 2019-12-17

## 2022-03-19 PROBLEM — Z86.73 HISTORY OF CVA (CEREBROVASCULAR ACCIDENT): Status: ACTIVE | Noted: 2020-10-19

## 2022-03-19 PROBLEM — R00.0 SINUS TACHYCARDIA: Status: ACTIVE | Noted: 2020-10-19

## 2022-03-19 PROBLEM — G81.94 LEFT HEMIPARESIS (HCC): Status: ACTIVE | Noted: 2020-10-19

## 2022-03-20 PROBLEM — E78.5 HYPERLIPEMIA: Status: ACTIVE | Noted: 2019-12-15

## 2022-03-20 PROBLEM — E83.42 HYPOMAGNESEMIA: Status: ACTIVE | Noted: 2020-03-07

## 2022-08-24 NOTE — PROGRESS NOTES
01/19/20 0659   NIH Stroke Scale   Interval Other (comment)   LOC 0   LOC Questions 0   LOC Commands 0   Best Gaze 0   Visual 0   Facial Palsy 1   Motor Right Arm 0   Motor Left Arm 4   Motor Right Leg 0   Motor Left Leg 4   Limb Ataxia 0   Sensory 0   Best Language 1   Dysarthria 1   Extinction and Inattention 0   Total 11   dual nih with al rn Pt called, see telephone encounter.   Pt states she ordered this medication in error.

## 2022-09-20 ENCOUNTER — TELEPHONE (OUTPATIENT)
Dept: CARDIOLOGY CLINIC | Age: 47
End: 2022-09-20

## 2022-09-20 NOTE — LETTER
[address] [place]  [address] [place] [address], [address] [place] [address]  [phone]  _____________________________________      PRE-OP RISK ASSESSMENT    Mally Benitez.  1975    Mally Benitez. is scheduled for cataract surgery with [facility] on 10/24/2022 and 11/14/2022.         Any cataract surgery is low risk from a cardiovascular standpoint.  Only on aspirin and should continue this             Thank you,             9/20/2022  4:17 PM

## 2022-09-20 NOTE — TELEPHONE ENCOUNTER
Patient having Cataract surgery on 10/24/22 and 11/14/22 . Requesting any medication hold and needs cardiac clearance.  Fax: [phone]

## 2023-03-17 NOTE — WOUND CARE
Noted consult for nail trimming, unable to provide this service in this facility. Podiatry may be able to provide toenail trimming outpatient. Nurse Lazarus Metz updated. You are ready for your surgery.  He now have prediabetes, but not diabetes.  We should check blood test yearly  VEE ALVARADO

## (undated) DEVICE — SUTURE PERMAHAND SZ 0 L30IN NONABSORBABLE BLK SILK BRAID A306H

## (undated) DEVICE — SUTURE PDS II SZ 1 L96IN ABSRB VLT TP-1 L65MM 1/2 CIR Z880G

## (undated) DEVICE — TUBING INSUFFLATION SMK EVAC HI FLO SET PNEUMOCLEAR

## (undated) DEVICE — COVER,LIGHT HANDLE,FLX,2/PK: Brand: MEDLINE INDUSTRIES, INC.

## (undated) DEVICE — SUTURE PERMAHAND SZ 2-0 L18IN NONABSORBABLE BLK L26MM PS 1588H

## (undated) DEVICE — [HIGH FLOW INSUFFLATOR,  DO NOT USE IF PACKAGE IS DAMAGED,  KEEP DRY,  KEEP AWAY FROM SUNLIGHT,  PROTECT FROM HEAT AND RADIOACTIVE SOURCES.]: Brand: PNEUMOSURE

## (undated) DEVICE — SUTURE PERMAHAND SZ 3-0 L30IN NONABSORBABLE BLK L26MM SH C017D

## (undated) DEVICE — STRIP,CLOSURE,WOUND,MEDI-STRIP,1/2X4: Brand: MEDLINE

## (undated) DEVICE — GARMENT,MEDLINE,DVT,INT,CALF,MED, GEN2: Brand: MEDLINE

## (undated) DEVICE — SUTURE VCRL SZ 4-0 L18IN ABSRB UD L19MM PS-2 3/8 CIR PRIM J496H

## (undated) DEVICE — SUTURE VCRL SZ 0 L36IN ABSRB UD L36MM CT-1 1/2 CIR J946H

## (undated) DEVICE — BLADE ELECTRODE: Brand: EDGE

## (undated) DEVICE — TROCAR: Brand: KII FIOS FIRST ENTRY

## (undated) DEVICE — (D)PREP SKN CHLRAPRP APPL 26ML -- CONVERT TO ITEM 371833

## (undated) DEVICE — Device

## (undated) DEVICE — E-Z CLEAN, PTFE COATED, ELECTROSURGICAL LAPAROSCOPIC ELECTRODE, J-HOOK, 33 CM., SINGLE-USE, FOR USE WITH HAND CONTROL PENCIL: Brand: MEGADYNE

## (undated) DEVICE — 2, DISPOSABLE SUCTION/IRRIGATOR WITHOUT DISPOSABLE TIP: Brand: STRYKEFLOW

## (undated) DEVICE — NEEDLE HYPO 25GA L1.5IN BLU POLYPR HUB S STL REG BVL STR

## (undated) DEVICE — 2000CC GUARDIAN II: Brand: GUARDIAN

## (undated) DEVICE — NEEDLE INSUF L120MM ULT VERES ENDOPATH

## (undated) DEVICE — BANDAGE,ADHESIVE,PLASTIC,1"X3",ST,LF: Brand: MEDLINE

## (undated) DEVICE — RESERVOIR,SUCTION,100CC,SILICONE: Brand: MEDLINE

## (undated) DEVICE — AGENT HEMSTAT W2XL14IN OXIDIZED REGENERATED CELOS ABSRB FOR

## (undated) DEVICE — APPLIER CLP M/L SHFT DIA5MM 15 LIG LIGAMAX 5

## (undated) DEVICE — BUTTON SWITCH PENCIL BLADE ELECTRODE, HOLSTER: Brand: EDGE

## (undated) DEVICE — CONTAINER SPEC FRMLN 120ML --

## (undated) DEVICE — LOGICUT SCISSOR LENGTH 320MM: Brand: LOGI - LAPAROSCOPIC INSTRUMENT SYSTEM

## (undated) DEVICE — NDL HYPO BVL NSAF 25GX1IN LF --

## (undated) DEVICE — DRAIN SURG 19FR 100% SIL RADPQ RND CHN FULL FLUT

## (undated) DEVICE — SUTURE SZ 0 27IN 5/8 CIR UR-6  TAPER PT VIOLET ABSRB VICRYL J603H

## (undated) DEVICE — SOLUTION IRRIG 3000ML 0.9% SOD CHL FLX CONT 0797208] ICU MEDICAL INC]

## (undated) DEVICE — MASTISOL ADHESIVE LIQ 2/3ML

## (undated) DEVICE — TROCAR: Brand: KII® SLEEVE

## (undated) DEVICE — TUBING, SUCTION, 1/4" X 10', STRAIGHT: Brand: MEDLINE

## (undated) DEVICE — COVER,TABLE,44X76,STERILE: Brand: MEDLINE

## (undated) DEVICE — GENERAL LAPAROSCOPY: Brand: MEDLINE INDUSTRIES, INC.

## (undated) DEVICE — BAG SPEC RETRV 275ML 10ML DISPOSABLE RELIACATCH

## (undated) DEVICE — TOWEL,OR,DSP,ST,BLUE,DLX,1/PK,80PK/CS: Brand: MEDLINE

## (undated) DEVICE — TROCARS: Brand: KII® BLUNT TIP ACCESS SYSTEM

## (undated) DEVICE — REM POLYHESIVE ADULT PATIENT RETURN ELECTRODE: Brand: VALLEYLAB

## (undated) DEVICE — VISUALIZATION SYSTEM: Brand: CLEARIFY

## (undated) DEVICE — AMD ANTIMICROBIAL GAUZE SPONGES 8 PLY USP TYPE VII: Brand: CURITY

## (undated) DEVICE — YANKAUER,BULB TIP,W/O VENT,RIGID,STERILE: Brand: MEDLINE

## (undated) DEVICE — 3M™ TEGADERM™ TRANSPARENT FILM DRESSING FRAME STYLE, 1624W, 2-3/8 IN X 2-3/4 IN (6 CM X 7 CM), 100/CT 4CT/CASE: Brand: 3M™ TEGADERM™

## (undated) DEVICE — APPLICATOR COT-TIP WOOD 6IN -- NON STRL